# Patient Record
Sex: FEMALE | Race: WHITE | NOT HISPANIC OR LATINO | Employment: OTHER | URBAN - NONMETROPOLITAN AREA
[De-identification: names, ages, dates, MRNs, and addresses within clinical notes are randomized per-mention and may not be internally consistent; named-entity substitution may affect disease eponyms.]

---

## 2017-05-15 ENCOUNTER — TELEPHONE (OUTPATIENT)
Dept: FAMILY MEDICINE | Facility: OTHER | Age: 67
End: 2017-05-15

## 2017-05-15 DIAGNOSIS — R30.0 DYSURIA: ICD-10-CM

## 2017-05-15 DIAGNOSIS — R30.0 DYSURIA: Primary | ICD-10-CM

## 2017-05-15 DIAGNOSIS — R82.90 ABNORMAL URINE FINDINGS: Primary | ICD-10-CM

## 2017-05-15 LAB
ALBUMIN UR-MCNC: NEGATIVE MG/DL
APPEARANCE UR: CLEAR
BACTERIA #/AREA URNS HPF: ABNORMAL /HPF
BILIRUB UR QL STRIP: NEGATIVE
COLOR UR AUTO: YELLOW
GLUCOSE UR STRIP-MCNC: NEGATIVE MG/DL
HGB UR QL STRIP: NEGATIVE
KETONES UR STRIP-MCNC: NEGATIVE MG/DL
LEUKOCYTE ESTERASE UR QL STRIP: ABNORMAL
MUCOUS THREADS #/AREA URNS LPF: PRESENT /LPF
NITRATE UR QL: NEGATIVE
PH UR STRIP: 7 PH (ref 4.7–8)
RBC #/AREA URNS AUTO: 1 /HPF (ref 0–2)
SP GR UR STRIP: 1.01 (ref 1–1.03)
SQUAMOUS #/AREA URNS AUTO: 3 /HPF (ref 0–1)
URN SPEC COLLECT METH UR: ABNORMAL
UROBILINOGEN UR STRIP-MCNC: NORMAL MG/DL (ref 0–2)
WBC #/AREA URNS AUTO: 1 /HPF (ref 0–2)

## 2017-05-15 PROCEDURE — 81001 URINALYSIS AUTO W/SCOPE: CPT | Mod: ZL | Performed by: PHYSICIAN ASSISTANT

## 2017-05-15 PROCEDURE — 87086 URINE CULTURE/COLONY COUNT: CPT | Mod: ZL | Performed by: PHYSICIAN ASSISTANT

## 2017-05-15 NOTE — TELEPHONE ENCOUNTER
3:54 PM    Reason for Call: OVERBOOK    Patient is having the following symptoms: uti    The patient is requesting an appointment for rafy if at all possible or anyone else. She does realize that Rafy is out .Was an appointment offered for this call? No    Preferred method for responding to this message: Telephone Call    If we cannot reach you directly, may we leave a detailed response at the number you provided? Yes    Can this message wait until your PCP/provider returns, if unavailable today? No,     Nilam Hyde

## 2017-05-15 NOTE — TELEPHONE ENCOUNTER
Notified we have no further openings today. Will come in for UA only and will call with results.  Emilee Cordon LPN

## 2017-05-18 LAB
BACTERIA SPEC CULT: ABNORMAL
MICRO REPORT STATUS: ABNORMAL
SPECIMEN SOURCE: ABNORMAL

## 2017-09-07 DIAGNOSIS — Z12.31 VISIT FOR SCREENING MAMMOGRAM: Primary | ICD-10-CM

## 2017-10-17 DIAGNOSIS — I10 ESSENTIAL HYPERTENSION WITH GOAL BLOOD PRESSURE LESS THAN 140/90: ICD-10-CM

## 2017-10-18 ENCOUNTER — RADIANT APPOINTMENT (OUTPATIENT)
Dept: MAMMOGRAPHY | Facility: OTHER | Age: 67
End: 2017-10-18
Attending: PHYSICIAN ASSISTANT
Payer: MEDICARE

## 2017-10-18 PROCEDURE — 77063 BREAST TOMOSYNTHESIS BI: CPT | Mod: TC

## 2017-10-18 NOTE — TELEPHONE ENCOUNTER
[place]      Last Written Prescription Date: 10/26/16  Last Fill Quantity: 90, # refills: 3  Last Office Visit with G, P or [facility] prescribing provider: 10/26/16       Potassium   Date Value Ref Range Status   10/26/2016 3.8 3.4 - 5.3 mmol/L Final     Creatinine   Date Value Ref Range Status   10/26/2016 0.78 0.52 - 1.04 mg/dL Final     BP Readings from Last 3 Encounters:   12/30/16 111/86   11/15/16 118/73   10/26/16 118/80

## 2017-10-19 RX ORDER — LOSARTAN POTASSIUM 50 MG/1
TABLET ORAL
Qty: 30 TABLET | Refills: 0 | Status: SHIPPED | OUTPATIENT
Start: 2017-10-19 | End: 2017-11-17

## 2017-10-30 DIAGNOSIS — E78.2 MIXED HYPERLIPIDEMIA: ICD-10-CM

## 2017-10-30 NOTE — LETTER
[address] [address]  [phone]                    Carmelita Watts  [address] [facility] [address]    November 2, 2017      Dear Carmelita Watts    APPOINTMENT REMINDER:   Our records indicates that it is time for you to be seen for a recheck.     Your current medication request will be approved for one refill but you will need to be seen before any additional refills can be approved.  Taking care of your health is important to us, and ongoing visits with your provider are vital to your care.    We look forward to seeing you in the near future.  You may call our office at [phone] to schedule a visit.     Please disregard this notice if you have already made an appointment.          Sincerely,      VENKATESH Vázquez  Family Practice

## 2017-10-31 NOTE — TELEPHONE ENCOUNTER
Atorvastatin 10mg  Last Written Prescription Date: 10-  Last Fill Quantity:90 , #3 refills:     Last Office Visit with G, [facility] or [facility] prescribing provider: 10-  Future Office Visit:

## 2017-11-02 RX ORDER — ATORVASTATIN CALCIUM 10 MG/1
TABLET, FILM COATED ORAL
Qty: 90 TABLET | Refills: 0 | Status: SHIPPED | OUTPATIENT
Start: 2017-11-02 | End: 2017-11-17

## 2017-11-13 DIAGNOSIS — F33.0 MAJOR DEPRESSIVE DISORDER, RECURRENT EPISODE, MILD (H): ICD-10-CM

## 2017-11-15 RX ORDER — ESCITALOPRAM OXALATE 10 MG/1
TABLET ORAL
Qty: 90 TABLET | Refills: 0 | Status: SHIPPED | OUTPATIENT
Start: 2017-11-15 | End: 2017-11-17

## 2017-11-15 NOTE — TELEPHONE ENCOUNTER
Lexapro  Please see note below.  Patient due for office visit.  Last office visit 10/26/16.  Last PHQ 10/26/16.    Medication pended.  Please advise.  Thank you

## 2017-11-15 NOTE — TELEPHONE ENCOUNTER
Lexapro  Last Written Prescription Date: 10/26/2016  Last Fill Quantity: 90,  # refills: 3   Last Office Visit with [facility], [facility] or [facility] prescribing provider: 10/26/2016                                         Next 5 appointments (look out 90 days)     Nov 17, 2017  8:00 AM CST   (Arrive by 7:45 AM)   SHORT with VENKATESH Stevens   [facility] ([facility] )    [address] [place] [address]   [phone]

## 2017-11-17 ENCOUNTER — OFFICE VISIT (OUTPATIENT)
Dept: FAMILY MEDICINE | Facility: OTHER | Age: 67
End: 2017-11-17
Attending: PHYSICIAN ASSISTANT
Payer: MEDICARE

## 2017-11-17 VITALS
BODY MASS INDEX: 29.45 KG/M2 | OXYGEN SATURATION: 96 % | SYSTOLIC BLOOD PRESSURE: 122 MMHG | HEIGHT: 61 IN | TEMPERATURE: 97.9 F | WEIGHT: 156 LBS | HEART RATE: 77 BPM | DIASTOLIC BLOOD PRESSURE: 66 MMHG

## 2017-11-17 DIAGNOSIS — I10 ESSENTIAL HYPERTENSION WITH GOAL BLOOD PRESSURE LESS THAN 140/90: ICD-10-CM

## 2017-11-17 DIAGNOSIS — Z23 NEED FOR VACCINATION: ICD-10-CM

## 2017-11-17 DIAGNOSIS — J30.2 CHRONIC SEASONAL ALLERGIC RHINITIS, UNSPECIFIED TRIGGER: ICD-10-CM

## 2017-11-17 DIAGNOSIS — F33.0 MAJOR DEPRESSIVE DISORDER, RECURRENT EPISODE, MILD (H): Primary | ICD-10-CM

## 2017-11-17 DIAGNOSIS — E78.2 MIXED HYPERLIPIDEMIA: ICD-10-CM

## 2017-11-17 LAB
ALBUMIN SERPL-MCNC: 3.6 G/DL (ref 3.4–5)
ALP SERPL-CCNC: 132 U/L (ref 40–150)
ALT SERPL W P-5'-P-CCNC: 27 U/L (ref 0–50)
ANION GAP SERPL CALCULATED.3IONS-SCNC: 5 MMOL/L (ref 3–14)
AST SERPL W P-5'-P-CCNC: 18 U/L (ref 0–45)
BILIRUB SERPL-MCNC: 0.5 MG/DL (ref 0.2–1.3)
BUN SERPL-MCNC: 10 MG/DL (ref 7–30)
CALCIUM SERPL-MCNC: 8.9 MG/DL (ref 8.5–10.1)
CHLORIDE SERPL-SCNC: 102 MMOL/L (ref 94–109)
CHOLEST SERPL-MCNC: 184 MG/DL
CO2 SERPL-SCNC: 29 MMOL/L (ref 20–32)
CREAT SERPL-MCNC: 0.69 MG/DL (ref 0.52–1.04)
ERYTHROCYTE [DISTWIDTH] IN BLOOD BY AUTOMATED COUNT: 13.7 % (ref 10–15)
GFR SERPL CREATININE-BSD FRML MDRD: 84 ML/MIN/1.7M2
GLUCOSE SERPL-MCNC: 104 MG/DL (ref 70–99)
HCT VFR BLD AUTO: 38.9 % (ref 35–47)
HDLC SERPL-MCNC: 60 MG/DL
HGB BLD-MCNC: 13.3 G/DL (ref 11.7–15.7)
LDLC SERPL CALC-MCNC: 108 MG/DL
MCH RBC QN AUTO: 30.9 PG (ref 26.5–33)
MCHC RBC AUTO-ENTMCNC: 34.2 G/DL (ref 31.5–36.5)
MCV RBC AUTO: 91 FL (ref 78–100)
NONHDLC SERPL-MCNC: 124 MG/DL
PLATELET # BLD AUTO: 266 10E9/L (ref 150–450)
POTASSIUM SERPL-SCNC: 4.1 MMOL/L (ref 3.4–5.3)
PROT SERPL-MCNC: 8.7 G/DL (ref 6.8–8.8)
RBC # BLD AUTO: 4.3 10E12/L (ref 3.8–5.2)
SODIUM SERPL-SCNC: 136 MMOL/L (ref 133–144)
TRIGL SERPL-MCNC: 78 MG/DL
TSH SERPL DL<=0.005 MIU/L-ACNC: 1.77 MU/L (ref 0.4–4)
WBC # BLD AUTO: 4.9 10E9/L (ref 4–11)

## 2017-11-17 PROCEDURE — 90714 TD VACC NO PRESV 7 YRS+ IM: CPT | Performed by: PHYSICIAN ASSISTANT

## 2017-11-17 PROCEDURE — 99212 OFFICE O/P EST SF 10 MIN: CPT

## 2017-11-17 PROCEDURE — 36415 COLL VENOUS BLD VENIPUNCTURE: CPT | Mod: ZL | Performed by: PHYSICIAN ASSISTANT

## 2017-11-17 PROCEDURE — 90471 IMMUNIZATION ADMIN: CPT | Performed by: PHYSICIAN ASSISTANT

## 2017-11-17 PROCEDURE — 80053 COMPREHEN METABOLIC PANEL: CPT | Mod: ZL | Performed by: PHYSICIAN ASSISTANT

## 2017-11-17 PROCEDURE — 80061 LIPID PANEL: CPT | Mod: ZL | Performed by: PHYSICIAN ASSISTANT

## 2017-11-17 PROCEDURE — 99214 OFFICE O/P EST MOD 30 MIN: CPT | Performed by: PHYSICIAN ASSISTANT

## 2017-11-17 PROCEDURE — 90472 IMMUNIZATION ADMIN EACH ADD: CPT | Performed by: PHYSICIAN ASSISTANT

## 2017-11-17 PROCEDURE — 85027 COMPLETE CBC AUTOMATED: CPT | Mod: ZL | Performed by: PHYSICIAN ASSISTANT

## 2017-11-17 PROCEDURE — 84443 ASSAY THYROID STIM HORMONE: CPT | Mod: ZL | Performed by: PHYSICIAN ASSISTANT

## 2017-11-17 PROCEDURE — 90670 PCV13 VACCINE IM: CPT | Performed by: PHYSICIAN ASSISTANT

## 2017-11-17 RX ORDER — ATORVASTATIN CALCIUM 10 MG/1
10 TABLET, FILM COATED ORAL DAILY
Qty: 90 TABLET | Refills: 3 | Status: SHIPPED | OUTPATIENT
Start: 2017-11-17 | End: 2019-01-03

## 2017-11-17 RX ORDER — LOSARTAN POTASSIUM 50 MG/1
50 TABLET ORAL DAILY
Qty: 90 TABLET | Refills: 3 | Status: SHIPPED | OUTPATIENT
Start: 2017-11-17 | End: 2018-11-12

## 2017-11-17 RX ORDER — ESCITALOPRAM OXALATE 10 MG/1
10 TABLET ORAL DAILY
Qty: 90 TABLET | Refills: 3 | Status: SHIPPED | OUTPATIENT
Start: 2017-11-17 | End: 2018-12-31

## 2017-11-17 ASSESSMENT — PATIENT HEALTH QUESTIONNAIRE - PHQ9: SUM OF ALL RESPONSES TO PHQ QUESTIONS 1-9: 1

## 2017-11-17 ASSESSMENT — PAIN SCALES - GENERAL: PAINLEVEL: NO PAIN (0)

## 2017-11-17 ASSESSMENT — ANXIETY QUESTIONNAIRES
5. BEING SO RESTLESS THAT IT IS HARD TO SIT STILL: NOT AT ALL
3. WORRYING TOO MUCH ABOUT DIFFERENT THINGS: NOT AT ALL
1. FEELING NERVOUS, ANXIOUS, OR ON EDGE: SEVERAL DAYS
7. FEELING AFRAID AS IF SOMETHING AWFUL MIGHT HAPPEN: NOT AT ALL
IF YOU CHECKED OFF ANY PROBLEMS ON THIS QUESTIONNAIRE, HOW DIFFICULT HAVE THESE PROBLEMS MADE IT FOR YOU TO DO YOUR WORK, TAKE CARE OF THINGS AT HOME, OR GET ALONG WITH OTHER PEOPLE: NOT DIFFICULT AT ALL
GAD7 TOTAL SCORE: 2
2. NOT BEING ABLE TO STOP OR CONTROL WORRYING: SEVERAL DAYS
4. TROUBLE RELAXING: NOT AT ALL
6. BECOMING EASILY ANNOYED OR IRRITABLE: NOT AT ALL

## 2017-11-17 NOTE — PATIENT INSTRUCTIONS
Thank you for choosing [facility].   I have office hours 8:00 am to 4:30 pm on Monday's, Wednesday's, Thursday's and Friday's. My nurse and I are out of the office every Tuesday.    Following your visit, when your labs and diagnostic testing have returned, I will review then and you will be contacted by my nurse.  If you are on My Chart, you can also view results there.    For refills, notify your pharmacy regarding what you need and the pharmacy will generate a refill request. Do not call my nurse as she is unable to process refill request. Please plan ahead and allow 3-5 days for refill requests.    You will generally receive a reminder call the day prior to your appointment.  If you cannot attend your appointment, please cancel your appointment with as much notice as possible.  If there is a pattern of failure to present for your appointments, I cannot provide consistent, meaningful, ongoing care for you. It is very important to me that you come in for your care, so we can best assist you with your health care needs.    IMPORTANT:  Please note that it is my standard of practice to NOT participate in prescribing ongoing requested Narcotic Analgesic therapy, and/or participate in the prescribing of other controlled substances.  My nurse and I am happy to assist you with the process of referral for alternative pain management as needed, and other treatment modalities including but not limited to:  Physical Therapy, Physical Medicine and Rehab, Counseling, Chiropractic Care, Orthopedic Care, and non-narcotic medication management.     In the event that you need to be seen for emergent concerns and I am out of office,  please see one of my colleagues for acute concerns.  You may also present to  or ER.  I appreciate the opportunity to serve you and look forward to supporting your healthcare needs in the future. Please contact me with any questions or concerns that you may  have.    Sincerely,      Kenyatta Trujillo RN, PA-C

## 2017-11-17 NOTE — MR AVS SNAPSHOT
After Visit Summary   11/17/2017    Carmelita Watts    MRN: [id number]           Patient Information     Date Of Birth          1950        Visit Information        Provider Department      11/17/2017 8:00 AM Kenyatta Trujillo PA [facility]        Today's Diagnoses     Major depressive disorder, recurrent episode, mild (H)    -  1    Essential hypertension with goal blood pressure less than 140/90        Mixed hyperlipidemia        Chronic seasonal allergic rhinitis, unspecified trigger          Care Instructions      Thank you for choosing [facility].   I have office hours 8:00 am to 4:30 pm on Monday's, Wednesday's, Thursday's and Friday's. My nurse and I are out of the office every Tuesday.    Following your visit, when your labs and diagnostic testing have returned, I will review then and you will be contacted by my nurse.  If you are on My Chart, you can also view results there.    For refills, notify your pharmacy regarding what you need and the pharmacy will generate a refill request. Do not call my nurse as she is unable to process refill request. Please plan ahead and allow 3-5 days for refill requests.    You will generally receive a reminder call the day prior to your appointment.  If you cannot attend your appointment, please cancel your appointment with as much notice as possible.  If there is a pattern of failure to present for your appointments, I cannot provide consistent, meaningful, ongoing care for you. It is very important to me that you come in for your care, so we can best assist you with your health care needs.    IMPORTANT:  Please note that it is my standard of practice to NOT participate in prescribing ongoing requested Narcotic Analgesic therapy, and/or participate in the prescribing of other controlled substances.  My nurse and I am happy to assist you with the process of referral for alternative pain management as needed, and other treatment  "modalities including but not limited to:  Physical Therapy, Physical Medicine and Rehab, Counseling, Chiropractic Care, Orthopedic Care, and non-narcotic medication management.     In the event that you need to be seen for emergent concerns and I am out of office,  please see one of my colleagues for acute concerns.  You may also present to  or ER.  I appreciate the opportunity to serve you and look forward to supporting your healthcare needs in the future. Please contact me with any questions or concerns that you may have.    Sincerely,      Kenyatta Trujillo RN, PA-C               Follow-ups after your visit        Who to contact     If you have questions or need follow up information about today's clinic visit or your schedule please contact [facility] directly at [phone].  Normal or non-critical lab and imaging results will be communicated to you by MyChart, letter or phone within 4 business days after the clinic has received the results. If you do not hear from us within 7 days, please contact the clinic through MyChart or phone. If you have a critical or abnormal lab result, we will notify you by phone as soon as possible.  Submit refill requests through [employer] or call your pharmacy and they will forward the refill request to us. Please allow 3 business days for your refill to be completed.          Additional Information About Your Visit        Care EveryWhere ID     This is your Care EveryWhere ID. This could be used by other organizations to access your [facility] medical records  [id number]        Your Vitals Were     Pulse Temperature Height Pulse Oximetry Breastfeeding? BMI (Body Mass Index)    77 97.9  F (36.6  C) (Tympanic) 5' 0.5\" (1.537 m) 96% No 29.97 kg/m2       Blood Pressure from Last 3 Encounters:   11/17/17 122/66   12/30/16 111/86   11/15/16 118/73    Weight from Last 3 Encounters:   11/17/17 156 lb (70.8 kg)   12/30/16 157 lb (71.2 kg)   11/15/16 153 lb (69.4 kg)            "   We Performed the Following     CBC with platelets     Comprehensive metabolic panel     Lipid Profile     TSH with free T4 reflex          Today's Medication Changes          These changes are accurate as of: 11/17/17  8:27 AM.  If you have any questions, ask your nurse or doctor.               These medicines have changed or have updated prescriptions.        Dose/Directions    atorvastatin 10 MG tablet   Commonly known as:  LIPITOR   This may have changed:  See the new instructions.   Used for:  Mixed hyperlipidemia   Changed by:  Kenyatta Trujillo PA        Dose:  10 mg   Take 1 tablet (10 mg) by mouth daily   Quantity:  90 tablet   Refills:  3       escitalopram 10 MG tablet   Commonly known as:  LEXAPRO   This may have changed:  See the new instructions.   Used for:  Major depressive disorder, recurrent episode, mild (H)   Changed by:  Kenyatta Trujillo PA        Dose:  10 mg   Take 1 tablet (10 mg) by mouth daily   Quantity:  90 tablet   Refills:  3       losartan 50 MG tablet   Commonly known as:  COZAAR   This may have changed:  See the new instructions.   Used for:  Essential hypertension with goal blood pressure less than 140/90   Changed by:  Kenyatta Trujillo PA        Dose:  50 mg   Take 1 tablet (50 mg) by mouth daily   Quantity:  90 tablet   Refills:  3            Where to get your medicines      [id number] - [place], [place] - [address] AT [facility] [address]  [address], [facility] [address]     Phone:  [phone]     atorvastatin 10 MG tablet    escitalopram 10 MG tablet    losartan 50 MG tablet                Primary Care Provider Office Phone # Fax #    VENKATESH Stevens [phone]       [facility] [address] [place] [address] 2  [facility] [address]        Equal Access to Services     [facility] [place] AH: Hadii aad ku hadasho Soomaali, waaxda luqadaha, qaybta kaalfaye nicolas, kerri osman.  So [facility] [phone].    ATENCIÓN: Si vera chisholm, tiene a hsu disposición servicios gratuitos de asistencia lingüística. Gini mccollum [phone].    We comply with applicable federal civil rights laws and [place] laws. We do not discriminate on the basis of race, color, national origin, age, disability, sex, sexual orientation, or gender identity.            Thank you!     [facility] you for [facility]  [facility] your [facility]. Our goal is always to provide you with excellent care. Hearing back from our patients is one way we can continue to improve our services. Please take a few minutes to complete the written survey that you may receive in the mail after your visit with us. Thank you!             Your Updated Medication List - Protect others around you: Learn how to safely use, store and throw away your medicines at www.disposemymeds.org.          This list is accurate as of: 11/17/17  8:27 AM.  Always use your most recent med list.                   Brand Name Dispense Instructions for use Diagnosis    aspirin 81 MG tablet      Take 81 mg by mouth daily        atorvastatin 10 MG tablet    LIPITOR    90 tablet    Take 1 tablet (10 mg) by mouth daily    Mixed hyperlipidemia       EPINEPHrine 0.3 MG/0.3ML injection    EPIPEN     Inject 0.3 mg into the muscle once as needed. Use as needed for anaphylaxis.        escitalopram 10 MG tablet    LEXAPRO    90 tablet    Take 1 tablet (10 mg) by mouth daily    Major depressive disorder, recurrent episode, mild (H)       fluticasone 50 MCG/ACT spray    FLONASE    48 mL    SPRAY 2 SPRAYS INTO BOTH NOSTRILS DAILY    Seasonal allergies       losartan 50 MG tablet    COZAAR    90 tablet    Take 1 tablet (50 mg) by mouth daily    Essential hypertension with goal blood pressure less than 140/90       olopatadine 0.1 % ophthalmic solution    PATANOL    1 Bottle    Instill one drop into affected eye(s) 2 times daily at an interval of 6-8 hours.        triamcinolone 0.1 %  cream    KENALOG    30 g    Apply sparingly to affected area three times daily for 14 days.    Irritant contact dermatitis due to metals

## 2017-11-17 NOTE — LETTER
My Depression Action Plan  Name: Carmelita Watts   Date of Birth 1950  Date: 11/17/2017    My doctor: Kenyatta Trujillo   My clinic: [facility]  [place] [address]  [phone]          GREEN    ZONE   Good Control    What it looks like:     Things are going generally well. You have normal up s and down s. You may even feel depressed from time to time, but bad moods usually last less than a day.   What you need to do:  1. Continue to care for yourself (see self care plan)  2. Check your depression survival kit and update it as needed  3. Follow your physician s recommendations including any medication.  4. Do not stop taking medication unless you consult with your physician first.           YELLOW         ZONE Getting Worse    What it looks like:     Depression is starting to interfere with your life.     It may be hard to get out of bed; you may be starting to isolate yourself from others.    Symptoms of depression are starting to last most all day and this has happened for several days.     You may have suicidal thoughts but they are not constant.   What you need to do:     1. Call your care team, your response to treatment will improve if you keep your care team informed of your progress. Yellow periods are signs an adjustment may need to be made.     2. Continue your self-care, even if you have to fake it!    3. Talk to someone in your support network    4. Open up your depression survival kit           RED    ZONE Medical Alert - Get Help    What it looks like:     Depression is seriously interfering with your life.     You may experience these or other symptoms: You can t get out of bed most days, can t work or engage in other necessary activities, you have trouble taking care of basic hygiene, or basic responsibilities, thoughts of suicide or death that will not go away, self-injurious behavior.     What you need to do:  1. Call your care team and request a  same-day appointment. If they are not available (weekends or after hours) call your local crisis line, emergency room or 911.      Electronically signed by: Chloe King, November 17, 2017    Depression Self Care Plan / Survival Kit    Self-Care for Depression  Here s the deal. Your body and mind are really not as separate as most people think.  What you do and think affects how you feel and how you feel influences what you do and think. This means if you do things that people who feel good do, it will help you feel better.  Sometimes this is all it takes.  There is also a place for medication and therapy depending on how severe your depression is, so be sure to consult with your medical provider and/ or Behavioral Health Consultant if your symptoms are worsening or not improving.     In order to better manage my stress, I will:    Exercise  Get some form of exercise, every day. This will help reduce pain and release endorphins, the  feel good  chemicals in your brain. This is almost as good as taking antidepressants!  This is not the same as joining a gym and then never going! (they count on that by the way ) It can be as simple as just going for a walk or doing some gardening, anything that will get you moving.      Hygiene   Maintain good hygiene (Get out of bed in the morning, Make your bed, Brush your teeth, Take a shower, and Get dressed like you were going to work, even if you are unemployed).  If your clothes don't fit try to get ones that do.    Diet  I will strive to eat foods that are good for me, drink plenty of water, and avoid excessive sugar, caffeine, alcohol, and other mood-altering substances.  Some foods that are helpful in depression are: complex carbohydrates, B vitamins, flaxseed, fish or fish oil, fresh fruits and vegetables.    Psychotherapy  I agree to participate in Individual Therapy (if recommended).    Medication  If prescribed medications, I agree to take them.  Missing doses can  result in serious side effects.  I understand that drinking alcohol, or other illicit drug use, may cause potential side effects.  I will not stop my medication abruptly without first discussing it with my provider.    Staying Connected With Others  I will stay in touch with my friends, family members, and my primary care provider/team.    Use your imagination  Be creative.  We all have a creative side; it doesn t matter if it s oil painting, sand castles, or mud pies! This will also kick up the endorphins.    Witness Beauty  (AKA stop and smell the roses) Take a look outside, even in mid-winter. Notice colors, textures. Watch the squirrels and birds.     Service to others  Be of service to others.  There is always someone else in need.  By helping others we can  get out of ourselves  and remember the really important things.  This also provides opportunities for practicing all the other parts of the program.    Humor  Laugh and be silly!  Adjust your TV habits for less news and crime-drama and more comedy.    Control your stress  Try breathing deep, massage therapy, biofeedback, and meditation. Find time to relax each day.     My support system    Clinic Contact:  Phone number:    Contact 1:  Phone number:    Contact 2:  Phone number:    Tenriism/:  Phone number:    Therapist:  Phone number:    Local crisis center:    Phone number:    Other community support:  Phone number:

## 2017-11-17 NOTE — PROGRESS NOTES
SUBJECTIVE:   Carmelita Watts is a 67 year old female who presents to clinic today for the following health issues:        Hyperlipidemia Follow-Up      Rate your low fat/cholesterol diet?: good    Taking statin?  Yes, no muscle aches from statin    Other lipid medications/supplements?:  none    Hypertension Follow-up      Outpatient blood pressures are not being checked.    Low Salt Diet: no added salt    Depression and Anxiety Follow-Up    Status since last visit: No change    Other associated symptoms:None    Complicating factors:     Significant life event: Yes-   diagnosed congestive heart failure      Current substance abuse: None    PHQ-9 Score and MyChart F/U Questions 10/1/2015 10/26/2016 11/17/2017   Total Score 0 0 1   Q9: Suicide Ideation Not at all Not at all Not at all     MINAL-7 SCORE 11/17/2017   Total Score 2       PHQ-9  English  PHQ-9   Any Language  GAD7  Suicide Assessment Five-step Evaluation and Treatment (SAFE-T)          Amount of exercise or physical activity: 4-5 days/week for an average of 30-45 minutes    Problems taking medications regularly: No    Medication side effects: none  Diet: regular (no restrictions)          Problem list and histories reviewed & adjusted, as indicated.  Additional history: as documented    Patient Active Problem List   Diagnosis     Hyperlipidemia LDL goal <130     Hypertension goal BP (blood pressure) < 140/80     Advanced care planning/counseling discussion     Major depressive disorder, recurrent episode, mild (H)     Seasonal allergies     Mixed hyperlipidemia     ACP (advance care planning)     Past Surgical History:   Procedure Laterality Date     APPENDECTOMY       CHOLECYSTECTOMY       COLONOSCOPY  07-    repeat 10 years     COLONOSCOPY N/A 12/30/2016    Procedure: COLONOSCOPY;  Surgeon: Bhaskar Franklin DO;  Location: [facility]     SINUS SURGERY       TUBAL STERILIZATION         Social History   Substance Use Topics     Smoking status:  "Former Smoker     Types: Cigarettes     Smokeless tobacco: Never Used      Comment: Tried to Quit (YES); QUIT in 1971; Passive Exposure (NO)     Alcohol use Yes      Comment: RARELY     Family History   Problem Relation Age of Onset     Breast Cancer Mother 66     Cause of Death     Parkinsonism Father      \"Possible\"         Current Outpatient Prescriptions   Medication Sig Dispense Refill     escitalopram (LEXAPRO) 10 MG tablet Take 1 tablet (10 mg) by mouth daily 90 tablet 3     losartan (COZAAR) 50 MG tablet Take 1 tablet (50 mg) by mouth daily 90 tablet 3     atorvastatin (LIPITOR) 10 MG tablet Take 1 tablet (10 mg) by mouth daily 90 tablet 3     fluticasone (FLONASE) 50 MCG/ACT nasal spray SPRAY 2 SPRAYS INTO BOTH NOSTRILS DAILY 48 mL 0     triamcinolone (KENALOG) 0.1 % cream Apply sparingly to affected area three times daily for 14 days. 30 g 0     aspirin 81 MG tablet Take 81 mg by mouth daily       olopatadine (PATANOL) 0.1 % ophthalmic solution Instill one drop into affected eye(s) 2 times daily at an interval of 6-8 hours. 1 Bottle 11     EPINEPHrine (EPIPEN) 0.3 MG/0.3ML injection Inject 0.3 mg into the muscle once as needed. Use as needed for anaphylaxis.       [DISCONTINUED] escitalopram (LEXAPRO) 10 MG tablet TAKE ONE TABLET BY MOUTH EVERY DAY 90 tablet 0     [DISCONTINUED] atorvastatin (LIPITOR) 10 MG tablet TAKE 1 TABLET BY MOUTH DAILY 90 tablet 0     [DISCONTINUED] losartan (COZAAR) 50 MG tablet TAKE ONE TABLET BY MOUTH EVERY DAY 30 tablet 0     Allergies   Allergen Reactions     Lisinopril Cough     Phenylephrine Hcl Other (See Comments)     **Entex  HEADACHE (SEVERE)     Phenylpropanolamine Other (See Comments)     **Entex  HEADACHE (SEVERE)     Pseudoephedrine Tannate Other (See Comments)     **Entex  HEADACHE (SEVERE)     Levofloxacin Rash     **Levaquin     Moxifloxacin Hcl [Avelox] Rash     Recent Labs   Lab Test  10/26/16   0921  10/01/15   1015  08/22/14   1155   LDL  109*  115  106   HDL  " "54  56  57   TRIG  115  110  85   ALT  21  25  22   CR  0.78  0.76  0.76   GFRESTIMATED  74  76  76   GFRESTBLACK  90  >90   GFR Calc    >90   GFR Calc     POTASSIUM  3.8  4.2  3.9   TSH  2.08  1.72  1.53      BP Readings from Last 3 Encounters:   11/17/17 122/66   12/30/16 111/86   11/15/16 118/73    Wt Readings from Last 3 Encounters:   11/17/17 156 lb (70.8 kg)   12/30/16 157 lb (71.2 kg)   11/15/16 153 lb (69.4 kg)                          Reviewed and updated as needed this visit by clinical staffAllergies       Reviewed and updated as needed this visit by Provider         ROS:  Constitutional, neuro, ENT, endocrine, pulmonary, cardiac, gastrointestinal, genitourinary, musculoskeletal, integument and psychiatric systems are negative, except as otherwise noted.      OBJECTIVE:                                                    /66 (BP Location: Left arm, Patient Position: Chair, Cuff Size: Adult Large)  Pulse 77  Temp 97.9  F (36.6  C) (Tympanic)  Ht 5' 0.5\" (1.537 m)  Wt 156 lb (70.8 kg)  SpO2 96%  Breastfeeding? No  BMI 29.97 kg/m2  Body mass index is 29.97 kg/(m^2).  GENERAL APPEARANCE: healthy, alert and no distress  EYES: Eyes grossly normal to inspection, PERRL and conjunctivae and sclerae normal  HENT: ear canals and TM's normal and nose and mouth without ulcers or lesions  NECK: no adenopathy, no asymmetry, masses, or scars and thyroid normal to palpation  RESP: lungs clear to auscultation - no rales, rhonchi or wheezes  CV: regular rates and rhythm, normal S1 S2, no S3 or S4 and no murmur, click or rub  LYMPHATICS: normal ant/post cervical and supraclavicular nodes  ABDOMEN: soft, nontender, without hepatosplenomegaly or masses and bowel sounds normal  MS: extremities normal- no gross deformities noted  SKIN: no suspicious lesions or rashes  NEURO: Normal strength and tone, mentation intact and speech normal  PSYCH: mentation appears normal and affect " normal/bright    Diagnostic test results:  Diagnostic Test Results:  No results found for this or any previous visit (from the past 24 hour(s)).       ASSESSMENT/PLAN:                                                    1. Major depressive disorder, recurrent episode, mild (H)  She is given a refill.   has taken ill and she seems to be doing quiet well with this.   - escitalopram (LEXAPRO) 10 MG tablet; Take 1 tablet (10 mg) by mouth daily  Dispense: 90 tablet; Refill: 3    2. Essential hypertension with goal blood pressure less than 140/90  She is given below.   - losartan (COZAAR) 50 MG tablet; Take 1 tablet (50 mg) by mouth daily  Dispense: 90 tablet; Refill: 3    3. Mixed hyperlipidemia  She is in need of refill.  Tolerating medications without difficulty.  - atorvastatin (LIPITOR) 10 MG tablet; Take 1 tablet (10 mg) by mouth daily  Dispense: 90 tablet; Refill: 3      See Patient Instructions    VENKATESH Trevino  [facility] [place]

## 2017-11-17 NOTE — NURSING NOTE
"Chief Complaint   Patient presents with     Hypertension     Lipids     Anxiety     Depression       Initial /66 (BP Location: Left arm, Patient Position: Chair, Cuff Size: Adult Large)  Pulse 77  Temp 97.9  F (36.6  C) (Tympanic)  Ht 5' 0.5\" (1.537 m)  Wt 156 lb (70.8 kg)  SpO2 96%  Breastfeeding? No  BMI 29.97 kg/m2 Estimated body mass index is 29.97 kg/(m^2) as calculated from the following:    Height as of this encounter: 5' 0.5\" (1.537 m).    Weight as of this encounter: 156 lb (70.8 kg).  Medication Reconciliation: complete   Chloe King CMA(AAMA)   "

## 2017-11-18 ASSESSMENT — ANXIETY QUESTIONNAIRES: GAD7 TOTAL SCORE: 2

## 2018-01-07 ENCOUNTER — HOSPITAL ENCOUNTER (EMERGENCY)
Facility: HOSPITAL | Age: 68
Discharge: HOME | End: 2018-01-07
Attending: NURSE PRACTITIONER | Admitting: NURSE PRACTITIONER
Payer: MEDICARE

## 2018-01-07 VITALS
RESPIRATION RATE: 16 BRPM | SYSTOLIC BLOOD PRESSURE: 138 MMHG | TEMPERATURE: 97.6 F | OXYGEN SATURATION: 98 % | DIASTOLIC BLOOD PRESSURE: 95 MMHG

## 2018-01-07 DIAGNOSIS — L20.82 FLEXURAL ECZEMA: ICD-10-CM

## 2018-01-07 PROCEDURE — 99213 OFFICE O/P EST LOW 20 MIN: CPT | Performed by: NURSE PRACTITIONER

## 2018-01-07 PROCEDURE — G0463 HOSPITAL OUTPT CLINIC VISIT: HCPCS

## 2018-01-07 RX ORDER — TRIAMCINOLONE ACETONIDE 1 MG/G
CREAM TOPICAL
Qty: 30 G | Refills: 0 | Status: SHIPPED | OUTPATIENT
Start: 2018-01-07 | End: 2018-01-21

## 2018-01-07 RX ORDER — CLOTRIMAZOLE 1 %
CREAM (GRAM) TOPICAL
Qty: 30 G | Refills: 0 | Status: SHIPPED | OUTPATIENT
Start: 2018-01-07 | End: 2018-12-31

## 2018-01-07 ASSESSMENT — ENCOUNTER SYMPTOMS
RESPIRATORY NEGATIVE: 1
FEVER: 0
MYALGIAS: 0
FATIGUE: 1
GASTROINTESTINAL NEGATIVE: 1
CHILLS: 0
APPETITE CHANGE: 0
HEADACHES: 0
ARTHRALGIAS: 0

## 2018-01-07 NOTE — ED NOTES
C/o rash to left breast for week pt states it has gotten worse but has not spread, notes rash looks a bit darker today than yesterday, some itchiness

## 2018-01-07 NOTE — ED PROVIDER NOTES
"  History     Chief Complaint   Patient presents with     Rash     under her left breast states \"I think it is shingles.\" no oozing or blisterig noted. states they have darkened over night      HPI  Carmelita Watts is a 67 year old female who presents today with a CC of a rash under her left breast x 1 week.  She notes that it started as nagging and irritating about a week ago.  She notes she had flu like symptoms Dec 19-21.  No fevers.  Those symptoms resolved throughout the Holidays.  She does note that she has been quite fatigued throughout the last 2-3 days, again no fevers.  The rash has expanded slightly since it first started.   She is not diabetic.  No history of yeast rashes.  She had eczema as a child, no recent problems with that.  She avoid scented laundry materials due to history of sensitive skin.  She denies new exposures to soaps, lotions, foods, medications, or supplements.  She was just in for a check up about 1 month ago with good reports.      Problem List:    Patient Active Problem List    Diagnosis Date Noted     ACP (advance care planning) 10/26/2016     Priority: Medium     Advance Care Planning 10/26/2016: ACP Review of Chart / Resources Provided:  Reviewed chart for advance care plan.  Carmelita Watts has no plan or code status on file. Discussed available resources and provided with information. Confirmed code status reflects current choices pending further ACP discussions.  Confirmed/documented legally designated decision makers.  Added by Alison Landeros             Major depressive disorder, recurrent episode, mild (H) 10/01/2015     Priority: Medium     Seasonal allergies 10/01/2015     Priority: Medium     Mixed hyperlipidemia 10/01/2015     Priority: Medium     Hyperlipidemia LDL goal <130 07/05/2013     Priority: Medium     Hypertension goal BP (blood pressure) < 140/80 07/05/2013     Priority: Medium     Advanced care planning/counseling discussion 07/09/2012     Priority: Medium    " "    Past Medical History:    Past Medical History:   Diagnosis Date     Essential hypertension 10/1/2015     Major depressive disorder, recurrent episode, mild (H) 10/1/2015     Mixed hyperlipidemia 10/1/2015     Other specified disorders of bladder 07/09/2012     Seasonal allergies 10/1/2015     Unspecified essential hypertension 03/19/2007     Unspecified sinusitis (chronic) 09/05/2007       Past Surgical History:    Past Surgical History:   Procedure Laterality Date     APPENDECTOMY       CHOLECYSTECTOMY       COLONOSCOPY  07-    repeat 10 years     COLONOSCOPY N/A 12/30/2016    Procedure: COLONOSCOPY;  Surgeon: Bhaskar Franklin DO;  Location: [facility]     SINUS SURGERY       TUBAL STERILIZATION         Family History:    Family History   Problem Relation Age of Onset     Breast Cancer Mother 66     Cause of Death     Parkinsonism Father      \"Possible\"       Social History:  Marital Status:   [2]  Social History   Substance Use Topics     Smoking status: Former Smoker     Types: Cigarettes     Smokeless tobacco: Never Used      Comment: Tried to Quit (YES); QUIT in 1971; Passive Exposure (NO)     Alcohol use Yes      Comment: RARELY        Medications:      triamcinolone (KENALOG) 0.1 % cream   clotrimazole (LOTRIMIN) 1 % cream   escitalopram (LEXAPRO) 10 MG tablet   losartan (COZAAR) 50 MG tablet   atorvastatin (LIPITOR) 10 MG tablet   fluticasone (FLONASE) 50 MCG/ACT nasal spray   aspirin 81 MG tablet   triamcinolone (KENALOG) 0.1 % cream   olopatadine (PATANOL) 0.1 % ophthalmic solution   EPINEPHrine (EPIPEN) 0.3 MG/0.3ML injection         Review of Systems   Constitutional: Positive for fatigue. Negative for appetite change, chills and fever.   HENT: Negative.    Respiratory: Negative.    Gastrointestinal: Negative.    Musculoskeletal: Negative for arthralgias and myalgias.   Skin: Positive for rash.   Neurological: Negative for headaches.       Physical Exam   BP: (!) 164/102  Heart Rate: " 75  Temp: 97.6  F (36.4  C)  Resp: 16  SpO2: 98 %      Physical Exam   Constitutional: She is oriented to person, place, and time. She appears well-developed and well-nourished. She is cooperative. She does not appear ill.   HENT:   Head: Normocephalic and atraumatic.   Cardiovascular: Normal rate.    Pulmonary/Chest: Effort normal.   Neurological: She is alert and oriented to person, place, and time.   Skin:   5 cm x 3 cm area under left breast of light pink mildly raised approximately 1-2 mm in diameter papular rash on a flesh colored base.  No vesicles noted, no crusting noted.  There is a few similar papules noted under across midline under next to the right side of the sternum   Psychiatric: She has a normal mood and affect. Her behavior is normal.   Nursing note and vitals reviewed.      ED Course     ED Course     Procedures      Assessments & Plan (with Medical Decision Making)     I have reviewed the nursing notes.    I have reviewed the findings, diagnosis, plan and need for follow up with the patient.  ASSESSMENT / PLAN:  (L20.82) Flexural eczema  Comment: patient has a history of eczema, due to area will treat patient for combination of eczema and possibility of yeast, reassurance given that rash does not appear that it is shingles: is not on erythematous base, crosses midline, not vesicular, has not crusted (has been present for a week)  Plan:  Avoid exposing your skin to things you know you are sensitive to    Triamcinolone cream as prescribed   Clotrimazole cream as prescribed   Soak 1-2 times daily in a tepid bath, pat dry and immediately apply scent free emollient lotion such as Aquaphor or Eucerin Cream, you may need to lotion 2-3 times daily   Dress in loose, soft cotton clothing. Cotton keeps the skin cool.   Avoid detergents and soaps with heavy perfumes   Try to prevent from scratching the irritation.    Try to avoid getting overheated   Follow up with Primary Care Provider if symptoms do not  improve, return to ED if symptoms worsen or new concerns develop      Discharge Medication List as of 1/7/2018 12:07 PM      START taking these medications    Details   !! triamcinolone (KENALOG) 0.1 % cream Apply to affected area 2-3 times daily for up to 14 days, use sparinglyDisp-30 g, [id number]      clotrimazole (LOTRIMIN) 1 % cream Apply twice daily for up to 14 daysDisp-30 g, [id number]       !! - Potential duplicate medications found. Please discuss with provider.          Final diagnoses:   Flexural eczema       1/7/2018   [facility]     Olive Campbell NP  01/07/18 1223

## 2018-01-07 NOTE — ED AVS SNAPSHOT
[facility]    [address] [address]    Phone:  [phone]                                       Carmelita Watts   MRN: [id number]    Department:  [facility]   Date of Visit:  1/7/2018           Patient Information     Date Of Birth          1950        Your diagnoses for this visit were:     Flexural eczema        You were seen by Jessika Leal NP and Olive Campbell NP.      Follow-up Information     Follow up with [facility].    Specialty:  EMERGENCY MEDICINE    Why:  As needed, If symptoms worsen, or concerns develop    Contact information:    [address] [place] [address] [phone]    Additional information:    From [facility]: Take [address]. Turn left at [address]. Turn left at the first stoplight on [address]. At the first stop sign, take a right onto [address]. Take a left into the parking lot and continue through until you reach the North enterance of the building.       From [place]: Take [address]. Take the [address] ramp towards [place]. Turn left onto [address]. Take a slight right onto [address] [facility]. Turn left at the first stoplight on [address]. At the first stop sign, take a right onto [address]. Take a left into the parking lot and continue through until you reach the North enterance of the building.       From [place]: Take [address]. Take a right at [address]. At the first stop sign, take a right onto [address]. Take a left into the parking lot and continue through until you reach the North enterance of the building.         Follow up with Kenyatta Trujillo PA.    Specialty:  Family Practice    Why:  As needed, if symptoms do not improve    Contact information:    [facility]  [address]  [facility] [address]  [phone]        Discharge References/Attachments      DERMATITIS (ECZEMA), MANAGING ATOPIC  (ENGLISH)         Review of your medicines      START taking        Dose / Directions Last dose taken    clotrimazole 1 % cream   Commonly known as:  LOTRIMIN   Quantity:  30 g        Apply twice daily for up to 14 days   Refills:  0          CONTINUE these medicines which may have CHANGED, or have new prescriptions. If we are uncertain of the size of tablets/capsules you have at home, strength may be listed as something that might have changed.        Dose / Directions Last dose taken    * triamcinolone 0.1 % cream   Commonly known as:  KENALOG   What changed:  Another medication with the same name was added. Make sure you understand how and when to take each.   Quantity:  30 g        Apply sparingly to affected area three times daily for 14 days.   Refills:  0        * triamcinolone 0.1 % cream   Commonly known as:  KENALOG   What changed:  You were already taking a medication with the same name, and this prescription was added. Make sure you understand how and when to take each.   Quantity:  30 g        Apply to affected area 2-3 times daily for up to 14 days, use sparingly   Refills:  0        * Notice:  This list has 2 medication(s) that are the same as other medications prescribed for you. Read the directions carefully, and ask your doctor or other care provider to review them with you.      Our records show that you are taking the medicines listed below. If these are incorrect, please call your family doctor or clinic.        Dose / Directions Last dose taken    aspirin 81 MG tablet   Dose:  81 mg        Take 81 mg by mouth daily   Refills:  0        atorvastatin 10 MG tablet   Commonly known as:  LIPITOR   Dose:  10 mg   Quantity:  90 tablet        Take 1 tablet (10 mg) by mouth daily   Refills:  3        EPINEPHrine 0.3 MG/0.3ML injection   Commonly known as:  EPIPEN   Dose:  0.3 mg        Inject 0.3 mg into the muscle once as needed. Use as needed for anaphylaxis.    Refills:  0        escitalopram 10 MG tablet   Commonly known as:  LEXAPRO   Dose:  10 mg   Quantity:  90 tablet        Take 1 tablet (10 mg) by mouth daily   Refills:  3        fluticasone 50 MCG/ACT spray   Commonly known as:  FLONASE   Quantity:  48 mL        SPRAY 2 SPRAYS INTO BOTH NOSTRILS DAILY   Refills:  0        losartan 50 MG tablet   Commonly known as:  COZAAR   Dose:  50 mg   Quantity:  90 tablet        Take 1 tablet (50 mg) by mouth daily   Refills:  3        olopatadine 0.1 % ophthalmic solution   Commonly known as:  PATANOL   Quantity:  1 Bottle        Instill one drop into affected eye(s) 2 times daily at an interval of 6-8 hours.   Refills:  11                Prescriptions were sent or printed at these locations (2 Prescriptions)                   [facility] - [place], [place] - [address] AT [facility] [address]   [address], [place] [address]    Telephone:  [phone]   Fax:  [phone]   Hours:                  E-Prescribed (2 of 2)         triamcinolone (KENALOG) 0.1 % cream               clotrimazole (LOTRIMIN) 1 % cream                Orders Needing Specimen Collection     None      Pending Results     No orders found from 1/5/2018 to 1/8/2018.            Pending Culture Results     No orders found from 1/5/2018 to 1/8/2018.            Thank you for choosing [facility]       Thank you for choosing [facility] for your care. Our goal is always to provide you with excellent care. Hearing back from our patients is one way we can continue to improve our services. Please take a few minutes to complete the written survey that you may receive in the mail after you visit with us. Thank you!        Care EveryWhere ID     This is your Care EveryWhere ID. This could be used by other organizations to access your [facility] medical records  [id number]        Equal Access to Services     SALEEM GARIBAY AH: esme Jorge qaybta kaalmada adeegyada, waxay  tessa hernandez ah. So [facility] [phone].    ATENCIÓN: Si habla español, tiene a hsu disposición servicios gratuitos de asistencia lingüística. Llame al [phone].    We comply with applicable federal civil rights laws and [place] laws. We do not discriminate on the basis of race, color, national origin, age, disability, sex, sexual orientation, or gender identity.            After Visit Summary       This is your record. Keep this with you and show to your community pharmacist(s) and doctor(s) at your next visit.

## 2018-01-07 NOTE — ED AVS SNAPSHOT
[facility]    [address] [address]    Phone:  [phone]                                       Carmelita Watts   MRN: [id number]    Department:  [facility]   Date of Visit:  1/7/2018           After Visit Summary Signature Page     I have received my discharge instructions, and my questions have been answered. I have discussed any challenges I see with this plan with the nurse or doctor.    ..........................................................................................................................................  Patient/Patient Representative Signature      ..........................................................................................................................................  Patient Representative Print Name and Relationship to Patient    ..................................................               ................................................  Date                                            Time    ..........................................................................................................................................  Reviewed by Signature/Title    ...................................................              ..............................................  Date                                                            Time

## 2018-01-30 DIAGNOSIS — E78.2 MIXED HYPERLIPIDEMIA: ICD-10-CM

## 2018-01-30 RX ORDER — ATORVASTATIN CALCIUM 10 MG/1
10 TABLET, FILM COATED ORAL DAILY
Qty: 90 TABLET | Refills: 3 | OUTPATIENT
Start: 2018-01-30

## 2018-01-30 NOTE — TELEPHONE ENCOUNTER
atorvastatin (LIPITOR) 10 MG tablet   Last Written Prescription Date:  11/17/17  Last Fill Quantity: 90,   # refills: 3  Last Office Visit: 11/17/17  Future Office visit:

## 2018-02-15 DIAGNOSIS — F33.0 MAJOR DEPRESSIVE DISORDER, RECURRENT EPISODE, MILD (H): ICD-10-CM

## 2018-02-15 NOTE — TELEPHONE ENCOUNTER
escitalopram  Last Written Prescription Date: 11/17/17  Last Fill Quantity: 90 # of Refills: 3  Last Office Visit: 11/17/17      Indu Ngo LPN

## 2018-02-16 RX ORDER — ESCITALOPRAM OXALATE 10 MG/1
TABLET ORAL
Qty: 90 TABLET | Refills: 0 | OUTPATIENT
Start: 2018-02-16

## 2018-11-05 ENCOUNTER — RADIANT APPOINTMENT (OUTPATIENT)
Dept: MAMMOGRAPHY | Facility: OTHER | Age: 68
End: 2018-11-05
Attending: PHYSICIAN ASSISTANT
Payer: MEDICARE

## 2018-11-05 DIAGNOSIS — R92.8 ABNORMAL MAMMOGRAM: ICD-10-CM

## 2018-11-05 DIAGNOSIS — Z12.39 SCREENING BREAST EXAMINATION: ICD-10-CM

## 2018-11-05 PROCEDURE — 77067 SCR MAMMO BI INCL CAD: CPT | Mod: TC

## 2018-11-05 PROCEDURE — 77065 DX MAMMO INCL CAD UNI: CPT | Mod: TC,GG

## 2018-11-12 DIAGNOSIS — I10 ESSENTIAL HYPERTENSION WITH GOAL BLOOD PRESSURE LESS THAN 140/90: ICD-10-CM

## 2018-11-12 NOTE — TELEPHONE ENCOUNTER
Losartan 50 mg       Last Written Prescription Date:  11/17/17  Last Fill Quantity: 90,   # refills: 3  Last Office Visit: 11/17/17  Future Office visit:

## 2018-11-13 RX ORDER — LOSARTAN POTASSIUM 50 MG/1
50 TABLET ORAL DAILY
Qty: 90 TABLET | Refills: 0 | Status: SHIPPED | OUTPATIENT
Start: 2018-11-13 | End: 2019-01-03

## 2018-12-31 ENCOUNTER — APPOINTMENT (OUTPATIENT)
Dept: CT IMAGING | Facility: HOSPITAL | Age: 68
End: 2018-12-31
Attending: PHYSICIAN ASSISTANT
Payer: MEDICARE

## 2018-12-31 ENCOUNTER — HOSPITAL ENCOUNTER (EMERGENCY)
Facility: HOSPITAL | Age: 68
Discharge: HOME | End: 2018-12-31
Attending: PHYSICIAN ASSISTANT | Admitting: PHYSICIAN ASSISTANT
Payer: MEDICARE

## 2018-12-31 ENCOUNTER — APPOINTMENT (OUTPATIENT)
Dept: GENERAL RADIOLOGY | Facility: HOSPITAL | Age: 68
End: 2018-12-31
Attending: PHYSICIAN ASSISTANT
Payer: MEDICARE

## 2018-12-31 VITALS
SYSTOLIC BLOOD PRESSURE: 123 MMHG | BODY MASS INDEX: 27.64 KG/M2 | RESPIRATION RATE: 18 BRPM | HEART RATE: 72 BPM | WEIGHT: 156 LBS | DIASTOLIC BLOOD PRESSURE: 75 MMHG | HEIGHT: 63 IN | OXYGEN SATURATION: 96 % | TEMPERATURE: 98 F

## 2018-12-31 DIAGNOSIS — H81.11 BENIGN PAROXYSMAL POSITIONAL VERTIGO, RIGHT: ICD-10-CM

## 2018-12-31 DIAGNOSIS — R42 DIZZINESS: ICD-10-CM

## 2018-12-31 DIAGNOSIS — I44.0 1ST DEGREE AV BLOCK: ICD-10-CM

## 2018-12-31 LAB
ALBUMIN SERPL-MCNC: 3 G/DL (ref 3.4–5)
ALP SERPL-CCNC: 111 U/L (ref 40–150)
ALT SERPL W P-5'-P-CCNC: 21 U/L (ref 0–50)
ANION GAP SERPL CALCULATED.3IONS-SCNC: 7 MMOL/L (ref 3–14)
AST SERPL W P-5'-P-CCNC: 21 U/L (ref 0–45)
BASOPHILS # BLD AUTO: 0 10E9/L (ref 0–0.2)
BASOPHILS NFR BLD AUTO: 0.6 %
BILIRUB SERPL-MCNC: 0.3 MG/DL (ref 0.2–1.3)
BUN SERPL-MCNC: 16 MG/DL (ref 7–30)
CALCIUM SERPL-MCNC: 8.1 MG/DL (ref 8.5–10.1)
CHLORIDE SERPL-SCNC: 100 MMOL/L (ref 94–109)
CO2 SERPL-SCNC: 23 MMOL/L (ref 20–32)
CREAT SERPL-MCNC: 0.7 MG/DL (ref 0.52–1.04)
D DIMER PPP DDU-MCNC: 204 NG/ML D-DU (ref 0–300)
DIFFERENTIAL METHOD BLD: ABNORMAL
EOSINOPHIL # BLD AUTO: 0.1 10E9/L (ref 0–0.7)
EOSINOPHIL NFR BLD AUTO: 1.5 %
ERYTHROCYTE [DISTWIDTH] IN BLOOD BY AUTOMATED COUNT: 14.6 % (ref 10–15)
GFR SERPL CREATININE-BSD FRML MDRD: 88 ML/MIN/{1.73_M2}
GLUCOSE SERPL-MCNC: 130 MG/DL (ref 70–99)
HCT VFR BLD AUTO: 31.7 % (ref 35–47)
HGB BLD-MCNC: 10.7 G/DL (ref 11.7–15.7)
IMM GRANULOCYTES # BLD: 0.1 10E9/L (ref 0–0.4)
IMM GRANULOCYTES NFR BLD: 1.9 %
LYMPHOCYTES # BLD AUTO: 2.1 10E9/L (ref 0.8–5.3)
LYMPHOCYTES NFR BLD AUTO: 39.1 %
MCH RBC QN AUTO: 30.3 PG (ref 26.5–33)
MCHC RBC AUTO-ENTMCNC: 33.8 G/DL (ref 31.5–36.5)
MCV RBC AUTO: 90 FL (ref 78–100)
MONOCYTES # BLD AUTO: 0.6 10E9/L (ref 0–1.3)
MONOCYTES NFR BLD AUTO: 11.4 %
NEUTROPHILS # BLD AUTO: 2.5 10E9/L (ref 1.6–8.3)
NEUTROPHILS NFR BLD AUTO: 45.5 %
NRBC # BLD AUTO: 0 10*3/UL
NRBC BLD AUTO-RTO: 0 /100
PLATELET # BLD AUTO: 225 10E9/L (ref 150–450)
POTASSIUM SERPL-SCNC: 3.6 MMOL/L (ref 3.4–5.3)
PROT SERPL-MCNC: 10.2 G/DL (ref 6.8–8.8)
RBC # BLD AUTO: 3.53 10E12/L (ref 3.8–5.2)
SODIUM SERPL-SCNC: 130 MMOL/L (ref 133–144)
TROPONIN I SERPL-MCNC: <0.015 UG/L (ref 0–0.04)
TROPONIN I SERPL-MCNC: <0.015 UG/L (ref 0–0.04)
WBC # BLD AUTO: 5.4 10E9/L (ref 4–11)

## 2018-12-31 PROCEDURE — 70450 CT HEAD/BRAIN W/O DYE: CPT | Mod: TC

## 2018-12-31 PROCEDURE — 85379 FIBRIN DEGRADATION QUANT: CPT | Performed by: PHYSICIAN ASSISTANT

## 2018-12-31 PROCEDURE — 71045 X-RAY EXAM CHEST 1 VIEW: CPT | Mod: TC

## 2018-12-31 PROCEDURE — 84484 ASSAY OF TROPONIN QUANT: CPT | Performed by: PHYSICIAN ASSISTANT

## 2018-12-31 PROCEDURE — 36415 COLL VENOUS BLD VENIPUNCTURE: CPT | Performed by: PHYSICIAN ASSISTANT

## 2018-12-31 PROCEDURE — 25000128 H RX IP 250 OP 636: Performed by: PHYSICIAN ASSISTANT

## 2018-12-31 PROCEDURE — 93010 ELECTROCARDIOGRAM REPORT: CPT | Performed by: INTERNAL MEDICINE

## 2018-12-31 PROCEDURE — 96374 THER/PROPH/DIAG INJ IV PUSH: CPT

## 2018-12-31 PROCEDURE — 96361 HYDRATE IV INFUSION ADD-ON: CPT

## 2018-12-31 PROCEDURE — 99285 EMERGENCY DEPT VISIT HI MDM: CPT | Mod: Z6 | Performed by: PHYSICIAN ASSISTANT

## 2018-12-31 PROCEDURE — 25000132 ZZH RX MED GY IP 250 OP 250 PS 637: Mod: GY

## 2018-12-31 PROCEDURE — 93005 ELECTROCARDIOGRAM TRACING: CPT

## 2018-12-31 PROCEDURE — A9270 NON-COVERED ITEM OR SERVICE: HCPCS | Mod: GY

## 2018-12-31 PROCEDURE — 85025 COMPLETE CBC W/AUTO DIFF WBC: CPT | Performed by: PHYSICIAN ASSISTANT

## 2018-12-31 PROCEDURE — 80053 COMPREHEN METABOLIC PANEL: CPT | Performed by: PHYSICIAN ASSISTANT

## 2018-12-31 PROCEDURE — 99285 EMERGENCY DEPT VISIT HI MDM: CPT | Mod: 25

## 2018-12-31 RX ORDER — DIAZEPAM 10 MG/2ML
5 INJECTION, SOLUTION INTRAMUSCULAR; INTRAVENOUS ONCE
Status: COMPLETED | OUTPATIENT
Start: 2018-12-31 | End: 2018-12-31

## 2018-12-31 RX ORDER — INFLUENZA A VIRUS A/VICTORIA/4897/2022 IVR-238 (H1N1) ANTIGEN (FORMALDEHYDE INACTIVATED), INFLUENZA A VIRUS A/CALIFORNIA/122/2022 SAN-022 (H3N2) ANTIGEN (FORMALDEHYDE INACTIVATED), AND INFLUENZA B VIRUS B/MICHIGAN/01/2021 ANTIGEN (FORMALDEHYDE INACTIVATED) 60; 60; 60 UG/.5ML; UG/.5ML; UG/.5ML
1 INJECTION, SUSPENSION INTRAMUSCULAR ONCE
Refills: 0 | COMMUNITY
Start: 2018-10-08 | End: 2019-05-03

## 2018-12-31 RX ORDER — DIAZEPAM 5 MG
2.5-5 TABLET ORAL EVERY 6 HOURS PRN
Qty: 20 TABLET | Refills: 0 | Status: SHIPPED | OUTPATIENT
Start: 2018-12-31 | End: 2019-05-03

## 2018-12-31 RX ORDER — SODIUM CHLORIDE 9 MG/ML
INJECTION, SOLUTION INTRAVENOUS CONTINUOUS
Status: DISCONTINUED | OUTPATIENT
Start: 2018-12-31 | End: 2018-12-31 | Stop reason: HOSPADM

## 2018-12-31 RX ORDER — MECLIZINE HYDROCHLORIDE 25 MG/1
TABLET ORAL
Status: COMPLETED
Start: 2018-12-31 | End: 2018-12-31

## 2018-12-31 RX ORDER — MECLIZINE HYDROCHLORIDE 25 MG/1
25 TABLET ORAL 3 TIMES DAILY PRN
Qty: 30 TABLET | Refills: 0 | Status: SHIPPED | OUTPATIENT
Start: 2018-12-31 | End: 2019-05-31

## 2018-12-31 RX ORDER — MECLIZINE HYDROCHLORIDE 25 MG/1
25 TABLET ORAL ONCE
Status: COMPLETED | OUTPATIENT
Start: 2018-12-31 | End: 2018-12-31

## 2018-12-31 RX ADMIN — SODIUM CHLORIDE: 9 INJECTION, SOLUTION INTRAVENOUS at 11:36

## 2018-12-31 RX ADMIN — MECLIZINE 25 MG: 25 TABLET ORAL at 11:36

## 2018-12-31 RX ADMIN — MECLIZINE HYDROCHLORIDE 25 MG: 25 TABLET ORAL at 11:36

## 2018-12-31 RX ADMIN — DIAZEPAM 2.5 MG: 5 INJECTION, SOLUTION INTRAMUSCULAR; INTRAVENOUS at 11:30

## 2018-12-31 ASSESSMENT — ENCOUNTER SYMPTOMS
NAUSEA: 1
FEVER: 0
EYE REDNESS: 0
SORE THROAT: 0
CHILLS: 0
SEIZURES: 0
RESPIRATORY NEGATIVE: 1
VOMITING: 1
FACIAL ASYMMETRY: 0
SPEECH DIFFICULTY: 0
NUMBNESS: 0
NECK STIFFNESS: 0
WEAKNESS: 0
CARDIOVASCULAR NEGATIVE: 1
TREMORS: 0
DIZZINESS: 1

## 2018-12-31 ASSESSMENT — MIFFLIN-ST. JEOR: SCORE: 1196.74

## 2018-12-31 NOTE — ED NOTES
Wasted 2.5 mg of Valium with Janna SWARTZ Rn. Partial dose given. Also notified pharmacy, [facility].

## 2018-12-31 NOTE — DISCHARGE INSTRUCTIONS
PT should be calling you to schedule a therapy appointment for your vertigo. Our stress test should be calling you to schedule an exercise stress test. Take the Meclizine as prescribed for dizziness. You may take the Valium as prescribed for dizziness not improved by the meclizine, no driving while taking this. Follow up with primary care in 3 days for re-check. Please return here sooner with any new or worsening symptoms.

## 2018-12-31 NOTE — ED TRIAGE NOTES
Pt in for complaints of sudden on set of dizziness and vomiting while cleaning bathroom this am. Arrived via EMS with ongoing vomiting. Was given zofran 4mg IV en-route to ED with no relief.

## 2018-12-31 NOTE — ED AVS SNAPSHOT
[facility]  [address] [address]  Phone:  [phone]                                    Carmelita Watts   MRN: [id number]    Department:  [facility]   Date of Visit:  12/31/2018           After Visit Summary Signature Page    I have received my discharge instructions, and my questions have been answered. I have discussed any challenges I see with this plan with the nurse or doctor.    ..........................................................................................................................................  Patient/Patient Representative Signature      ..........................................................................................................................................  Patient Representative Print Name and Relationship to Patient    ..................................................               ................................................  Date                                   Time    ..........................................................................................................................................  Reviewed by Signature/Title    ...................................................              ..............................................  Date                                               Time          22EPIC Rev 08/18

## 2018-12-31 NOTE — ED NOTES
Discharge instructions completed with patient with no questions or concerns. Aware of stress test order, as well as PT consult. Written Rx given. Aware of Rx to . Verbalized understanding to not drive while taking Valium.

## 2019-01-01 NOTE — ED PROVIDER NOTES
History     Chief Complaint   Patient presents with     Dizziness     HPI  Carmelita Watts is a 68 year old female who present with sudden onset of dizziness after bending over to clean the floor this am. Has to close her eyes to stop the room from spinning. Very nauseous and emesis x 2. Denies unilateral weakness/numbness. Denies CP or dyspnea.     Problem List:    Patient Active Problem List    Diagnosis Date Noted     ACP (advance care planning) 10/26/2016     Priority: Medium     Advance Care Planning 10/26/2016: ACP Review of Chart / Resources Provided:  Reviewed chart for advance care plan.  Carmelita Watts has no plan or code status on file. Discussed available resources and provided with information. Confirmed code status reflects current choices pending further ACP discussions.  Confirmed/documented legally designated decision makers.  Added by Alison Landeros             Major depressive disorder, recurrent episode, mild (H) 10/01/2015     Priority: Medium     Seasonal allergies 10/01/2015     Priority: Medium     Mixed hyperlipidemia 10/01/2015     Priority: Medium     Hyperlipidemia LDL goal <130 07/05/2013     Priority: Medium     Hypertension goal BP (blood pressure) < 140/80 07/05/2013     Priority: Medium     Advanced care planning/counseling discussion 07/09/2012     Priority: Medium        Past Medical History:    Past Medical History:   Diagnosis Date     Essential hypertension 10/1/2015     Major depressive disorder, recurrent episode, mild (H) 10/1/2015     Mixed hyperlipidemia 10/1/2015     Other specified disorders of bladder 07/09/2012     Seasonal allergies 10/1/2015     Unspecified essential hypertension 03/19/2007     Unspecified sinusitis (chronic) 09/05/2007       Past Surgical History:    Past Surgical History:   Procedure Laterality Date     APPENDECTOMY       CHOLECYSTECTOMY       COLONOSCOPY  07-    repeat 10 years     COLONOSCOPY N/A 12/30/2016    Procedure: COLONOSCOPY;   "Surgeon: Bhaskar Franklin DO;  Location: [facility]     SINUS SURGERY       TUBAL STERILIZATION         Family History:    Family History   Problem Relation Age of Onset     Breast Cancer Mother 66        Cause of Death     Parkinsonism Father         \"Possible\"       Social History:  Marital Status:   [2]  Social History     Tobacco Use     Smoking status: Former Smoker     Types: Cigarettes     Smokeless tobacco: Never Used     Tobacco comment: Tried to Quit (YES); QUIT in 1971; Passive Exposure (NO)   Substance Use Topics     Alcohol use: Yes     Comment: RARELY     Drug use: No        Medications:      aspirin 81 MG tablet   atorvastatin (LIPITOR) 10 MG tablet   diazepam (VALIUM) 5 MG tablet   losartan (COZAAR) 50 MG tablet   meclizine (ANTIVERT) 25 MG tablet   EPINEPHrine (EPIPEN) 0.3 MG/0.3ML injection   fluticasone (FLONASE) 50 MCG/ACT nasal spray   FLUZONE HIGH-DOSE 0.5 ML injection         Review of Systems   Constitutional: Negative for chills and fever.   HENT: Negative for sore throat.    Eyes: Negative for redness and visual disturbance.   Respiratory: Negative.    Cardiovascular: Negative.    Gastrointestinal: Positive for nausea and vomiting.   Musculoskeletal: Negative for neck stiffness.   Skin: Negative for rash.   Neurological: Positive for dizziness. Negative for tremors, seizures, syncope, facial asymmetry, speech difficulty, weakness and numbness.   All other systems reviewed and are negative.      Physical Exam   BP: 148/84  Pulse: 72  Heart Rate: 68  Temp: 96.8  F (36  C)  Resp: 20  Height: 160 cm (5' 3\")  Weight: 70.8 kg (156 lb)  SpO2: 97 %      Physical Exam   Constitutional: She is oriented to person, place, and time. She appears well-developed and well-nourished.  Non-toxic appearance. She does not have a sickly appearance. She does not appear ill. She appears distressed.   Eyes closed. Distressed due to dizziness/nausea.    HENT:   Head: Normocephalic and atraumatic.   Right Ear: " Hearing, tympanic membrane, external ear and ear canal normal.   Left Ear: Hearing, tympanic membrane, external ear and ear canal normal.   Nose: Nose normal. Right sinus exhibits no maxillary sinus tenderness and no frontal sinus tenderness. Left sinus exhibits no maxillary sinus tenderness and no frontal sinus tenderness.   Mouth/Throat: Uvula is midline, oropharynx is clear and moist and mucous membranes are normal. No oropharyngeal exudate.   Eyes: Conjunctivae are normal. Pupils are equal, round, and reactive to light. Right eye exhibits no discharge. Left eye exhibits no discharge. No scleral icterus. Right eye exhibits nystagmus (Horizontal nystagmus when gazing to the right ). Left eye exhibits nystagmus.   Fundoscopic exam:       The right eye shows no papilledema.        The left eye shows no papilledema.   Neck: Trachea normal, normal range of motion and full passive range of motion without pain. Neck supple. No Brudzinski's sign and no Kernig's sign noted. No thyroid mass and no thyromegaly present.   Cardiovascular: Normal rate, regular rhythm, normal heart sounds and intact distal pulses. Exam reveals no gallop and no friction rub.   No murmur heard.  Pulmonary/Chest: Effort normal and breath sounds normal. No respiratory distress. She has no wheezes. She has no rales. She exhibits no tenderness.   Abdominal: Soft. Bowel sounds are normal. There is no tenderness.   Musculoskeletal: Normal range of motion. She exhibits no edema.   Lymphadenopathy:     She has no cervical adenopathy.   Neurological: She is alert and oriented to person, place, and time. She has normal strength and normal reflexes. She displays no atrophy, no tremor and normal reflexes. No cranial nerve deficit or sensory deficit. She exhibits normal muscle tone. She displays a negative Romberg sign. She displays no seizure activity. Coordination and gait normal. GCS eye subscore is 4. GCS verbal subscore is 5. GCS motor subscore is 6.    Skin: Skin is warm and dry. Capillary refill takes less than 2 seconds. No rash noted. She is not diaphoretic. No erythema. No pallor.   Psychiatric: She has a normal mood and affect. Her behavior is normal. Judgment and thought content normal.   Nursing note and vitals reviewed.      ED Course        Procedures          EKG: Sinus rhythm with 1st degree AV block which is new when compared to prior EKG in 206. No ST-T changes.     Results for orders placed or performed during the hospital encounter of 12/31/18 (from the past 24 hour(s))   CBC with platelets differential   Result Value Ref Range    WBC 5.4 4.0 - 11.0 10e9/L    RBC Count 3.53 (L) 3.8 - 5.2 10e12/L    Hemoglobin 10.7 (L) 11.7 - 15.7 g/dL    Hematocrit 31.7 (L) 35.0 - 47.0 %    MCV 90 78 - 100 fl    MCH 30.3 26.5 - 33.0 pg    MCHC 33.8 31.5 - 36.5 g/dL    RDW 14.6 10.0 - 15.0 %    Platelet Count 225 150 - 450 10e9/L    Diff Method Automated Method     % Neutrophils 45.5 %    % Lymphocytes 39.1 %    % Monocytes 11.4 %    % Eosinophils 1.5 %    % Basophils 0.6 %    % Immature Granulocytes 1.9 %    Nucleated RBCs 0 0 /100    Absolute Neutrophil 2.5 1.6 - 8.3 10e9/L    Absolute Lymphocytes 2.1 0.8 - 5.3 10e9/L    Absolute Monocytes 0.6 0.0 - 1.3 10e9/L    Absolute Eosinophils 0.1 0.0 - 0.7 10e9/L    Absolute Basophils 0.0 0.0 - 0.2 10e9/L    Abs Immature Granulocytes 0.1 0 - 0.4 10e9/L    Absolute Nucleated RBC 0.0    Comprehensive metabolic panel   Result Value Ref Range    Sodium 130 (L) 133 - 144 mmol/L    Potassium 3.6 3.4 - 5.3 mmol/L    Chloride 100 94 - 109 mmol/L    Carbon Dioxide 23 20 - 32 mmol/L    Anion Gap 7 3 - 14 mmol/L    Glucose 130 (H) 70 - 99 mg/dL    Urea Nitrogen 16 7 - 30 mg/dL    Creatinine 0.70 0.52 - 1.04 mg/dL    GFR Estimate 88 >60 mL/min/[1.73_m2]    GFR Estimate If Black >90 >60 mL/min/[1.73_m2]    Calcium 8.1 (L) 8.5 - 10.1 mg/dL    Bilirubin Total 0.3 0.2 - 1.3 mg/dL    Albumin 3.0 (L) 3.4 - 5.0 g/dL    Protein Total 10.2  (H) 6.8 - 8.8 g/dL    Alkaline Phosphatase 111 40 - 150 U/L    ALT 21 0 - 50 U/L    AST 21 0 - 45 U/L   D-Dimer ([facility])   Result Value Ref Range    D-Dimer ng/mL 204 0 - 300 ng/ml D-DU   Troponin I   Result Value Ref Range    Troponin I ES <0.015 0.000 - 0.045 ug/L   Chest  XR, 1 view portable    Narrative    XR CHEST PORT 1 VW    HISTORY: 68 yearsFemale dizziness    TECHNIQUE: A single view of the chest was performed    COMPARISON: 3/29/2013    FINDINGS: Heart size and pulmonary vascularity are within normal  limits. Lungs are clear. No consolidating airspace opacities are  present.      Impression    IMPRESSION: Clear chest    ADAM CARRILLO MD   Head CT w/o contrast    Narrative    PROCEDURE: CT HEAD W/O CONTRAST     HISTORY: See the Clinical Information for Interpreting Provider;  dizziness.    COMPARISON: None.    TECHNIQUE:  Helical images of the head from the foramen magnum to the  vertex were obtained without contrast.    FINDINGS: The ventricular system is normal in size. There is some  white matter low-density consistent with small vessel disease. There  are no masses ventricular shifts or extracerebral collections. The  brainstem and cerebellum appear normal. Cranial vault is intact. The  paranasal sinuses are clear.  The visualized paranasal sinuses are  clear. No abnormal fluid was seen in the middle ear cavity or  mastoids.      Impression    IMPRESSION: No masses or ventricular shifts      ROSEMARIE NAPOLES MD   Troponin I (second draw)   Result Value Ref Range    Troponin I ES <0.015 0.000 - 0.045 ug/L       Medications   diazepam (VALIUM) injection 5 mg (2.5 mg Intravenous Given 12/31/18 1130)   meclizine (ANTIVERT) tablet 25 mg (25 mg Oral Given 12/31/18 1136)       Assessments & Plan (with Medical Decision Making)   Carmelita presents with sudden onset of dizziness after bending over this am. She is distressed upon arrival, eyes closed, vomiting. She feels back to normal after Valium 2.5mg IV and  meclizine 25mg PO. Head CT negative for acute findings. Cardiac work up negative other than new 1st degree AV block on EKG, change from 2016. For this reason, outpatient stress test was ordered. PT referral for vertigo ordered as well. RX for valium and meclizine given. She is stable for discharged home at this time, plan below.     Plan: PT should be calling you to schedule a therapy appointment for your vertigo. Our stress test should be calling you to schedule an exercise stress test. Take the Meclizine as prescribed for dizziness. You may take the Valium as prescribed for dizziness not improved by the meclizine, no driving while taking this. Follow up with primary care in 3 days for re-check. Please return here sooner with any new or worsening symptoms.     I have reviewed the nursing notes.    I have reviewed the findings, diagnosis, plan and need for follow up with the patient.       Medication List      Started    diazepam 5 MG tablet  Commonly known as:  VALIUM  2.5-5 mg, Oral, EVERY 6 HOURS PRN     meclizine 25 MG tablet  Commonly known as:  ANTIVERT  25 mg, Oral, 3 TIMES DAILY PRN            Final diagnoses:   Benign paroxysmal positional vertigo, right   Dizziness   1st degree AV block       12/31/2018   [facility]     Carmelita Mendoza PA-C  12/31/18 3225

## 2019-01-02 ENCOUNTER — TELEPHONE (OUTPATIENT)
Dept: FAMILY MEDICINE | Facility: OTHER | Age: 69
End: 2019-01-02

## 2019-01-02 NOTE — TELEPHONE ENCOUNTER
10:31 AM    Reason for Call: OVERBOOK    Patient is having the following symptoms: vertigo attack for 3  days.    The patient is requesting an appointment for asap with provider.    Was an appointment offered for this call? No  If yes : Appointment type              Date    Preferred method for responding to this message: Telephone Call  What is your phone number ?[phone]    If we cannot reach you directly, may we leave a detailed response at the number you provided? Yes    Can this message wait until your PCP/provider returns, if unavailable today? Not applicable, provider is in    Nikki Shipley

## 2019-01-03 ENCOUNTER — OFFICE VISIT (OUTPATIENT)
Dept: FAMILY MEDICINE | Facility: OTHER | Age: 69
End: 2019-01-03
Attending: PHYSICIAN ASSISTANT
Payer: COMMERCIAL

## 2019-01-03 VITALS
TEMPERATURE: 98.1 F | DIASTOLIC BLOOD PRESSURE: 78 MMHG | BODY MASS INDEX: 28.17 KG/M2 | WEIGHT: 159 LBS | HEART RATE: 83 BPM | HEIGHT: 63 IN | RESPIRATION RATE: 17 BRPM | OXYGEN SATURATION: 95 % | SYSTOLIC BLOOD PRESSURE: 120 MMHG

## 2019-01-03 DIAGNOSIS — I10 BENIGN ESSENTIAL HYPERTENSION: ICD-10-CM

## 2019-01-03 DIAGNOSIS — E87.1 LOW SODIUM LEVELS: Primary | ICD-10-CM

## 2019-01-03 DIAGNOSIS — E78.2 MIXED HYPERLIPIDEMIA: ICD-10-CM

## 2019-01-03 DIAGNOSIS — R42 VERTIGO: ICD-10-CM

## 2019-01-03 DIAGNOSIS — F32.1 CURRENT MODERATE EPISODE OF MAJOR DEPRESSIVE DISORDER WITHOUT PRIOR EPISODE (H): ICD-10-CM

## 2019-01-03 DIAGNOSIS — I10 ESSENTIAL HYPERTENSION WITH GOAL BLOOD PRESSURE LESS THAN 140/90: ICD-10-CM

## 2019-01-03 LAB
CHOLEST SERPL-MCNC: 153 MG/DL
HDLC SERPL-MCNC: 42 MG/DL
LDLC SERPL CALC-MCNC: 86 MG/DL
NONHDLC SERPL-MCNC: 111 MG/DL
TRIGL SERPL-MCNC: 126 MG/DL

## 2019-01-03 PROCEDURE — 80061 LIPID PANEL: CPT | Mod: ZL | Performed by: PHYSICIAN ASSISTANT

## 2019-01-03 PROCEDURE — G0463 HOSPITAL OUTPT CLINIC VISIT: HCPCS

## 2019-01-03 PROCEDURE — 36415 COLL VENOUS BLD VENIPUNCTURE: CPT | Mod: ZL | Performed by: PHYSICIAN ASSISTANT

## 2019-01-03 PROCEDURE — 99214 OFFICE O/P EST MOD 30 MIN: CPT | Performed by: PHYSICIAN ASSISTANT

## 2019-01-03 RX ORDER — CITALOPRAM HYDROBROMIDE 20 MG/1
20 TABLET ORAL DAILY
Qty: 90 TABLET | Refills: 3 | Status: SHIPPED | OUTPATIENT
Start: 2019-01-03 | End: 2019-10-03 | Stop reason: SINTOL

## 2019-01-03 RX ORDER — LOSARTAN POTASSIUM 50 MG/1
50 TABLET ORAL DAILY
Qty: 90 TABLET | Refills: 3 | Status: SHIPPED | OUTPATIENT
Start: 2019-01-03 | End: 2019-11-14

## 2019-01-03 RX ORDER — ATORVASTATIN CALCIUM 10 MG/1
10 TABLET, FILM COATED ORAL DAILY
Qty: 90 TABLET | Refills: 3 | Status: SHIPPED | OUTPATIENT
Start: 2019-01-03 | End: 2020-02-14

## 2019-01-03 ASSESSMENT — ANXIETY QUESTIONNAIRES
6. BECOMING EASILY ANNOYED OR IRRITABLE: SEVERAL DAYS
4. TROUBLE RELAXING: NOT AT ALL
GAD7 TOTAL SCORE: 3
2. NOT BEING ABLE TO STOP OR CONTROL WORRYING: NOT AT ALL
3. WORRYING TOO MUCH ABOUT DIFFERENT THINGS: SEVERAL DAYS
1. FEELING NERVOUS, ANXIOUS, OR ON EDGE: SEVERAL DAYS
7. FEELING AFRAID AS IF SOMETHING AWFUL MIGHT HAPPEN: NOT AT ALL
IF YOU CHECKED OFF ANY PROBLEMS ON THIS QUESTIONNAIRE, HOW DIFFICULT HAVE THESE PROBLEMS MADE IT FOR YOU TO DO YOUR WORK, TAKE CARE OF THINGS AT HOME, OR GET ALONG WITH OTHER PEOPLE: NOT DIFFICULT AT ALL
5. BEING SO RESTLESS THAT IT IS HARD TO SIT STILL: NOT AT ALL

## 2019-01-03 ASSESSMENT — MIFFLIN-ST. JEOR: SCORE: 1210.35

## 2019-01-03 ASSESSMENT — PAIN SCALES - GENERAL: PAINLEVEL: NO PAIN (0)

## 2019-01-03 ASSESSMENT — PATIENT HEALTH QUESTIONNAIRE - PHQ9: SUM OF ALL RESPONSES TO PHQ QUESTIONS 1-9: 5

## 2019-01-03 NOTE — NURSING NOTE
"Chief Complaint   Patient presents with     Dizziness       Initial /78 (BP Location: Left arm, Patient Position: Sitting, Cuff Size: Adult Regular)   Pulse 83   Temp 98.1  F (36.7  C) (Tympanic)   Resp 17   Ht 1.6 m (5' 3\")   Wt 72.1 kg (159 lb)   SpO2 95%   BMI 28.17 kg/m   Estimated body mass index is 28.17 kg/m  as calculated from the following:    Height as of this encounter: 1.6 m (5' 3\").    Weight as of this encounter: 72.1 kg (159 lb).  Medication Reconciliation: complete    Davida Curiel LPN  "

## 2019-01-03 NOTE — PROGRESS NOTES
"  SUBJECTIVE:   Carmelita Watts is a 68 year old female who presents to clinic today for the following health issues:      Dizziness      Duration: 1 episode on December 31st    Description   Feeling faint:  no   Feeling like the surroundings are moving: no   Loss of consciousness or falls: no     Intensity:  mild    Accompanying signs and symptoms:   Nausea/vomitting: yes  Palpitations: no   Weakness in arms or legs: yes  Vision or speech changes: no   Ringing in ears (Tinnitus): YES  Hearing loss related to dizziness: no   Other (fevers/chills/sweating/dyspnea): no     History (similar episodes/head trauma/previous evaluation/recent bleeding): None    Precipitating or alleviating factors (new meds/chemicals): None  Worse with activity/head movement: YES    Therapies tried and outcome: None          Problem list and histories reviewed & adjusted, as indicated.  Additional history: as documented    Patient Active Problem List   Diagnosis     Hyperlipidemia LDL goal <130     Hypertension goal BP (blood pressure) < 140/80     Advanced care planning/counseling discussion     Major depressive disorder, recurrent episode, mild (H)     Seasonal allergies     Mixed hyperlipidemia     ACP (advance care planning)     Past Surgical History:   Procedure Laterality Date     APPENDECTOMY       CHOLECYSTECTOMY       COLONOSCOPY  07-    repeat 10 years     COLONOSCOPY N/A 12/30/2016    Procedure: COLONOSCOPY;  Surgeon: Bhaskar Franklin DO;  Location: [facility]     SINUS SURGERY       TUBAL STERILIZATION         Social History     Tobacco Use     Smoking status: Former Smoker     Types: Cigarettes     Smokeless tobacco: Never Used     Tobacco comment: Tried to Quit (YES); QUIT in 1971; Passive Exposure (NO)   Substance Use Topics     Alcohol use: Yes     Comment: RARELY     Family History   Problem Relation Age of Onset     Breast Cancer Mother 66        Cause of Death     Parkinsonism Father         \"Possible\"     "     Current Outpatient Medications   Medication Sig Dispense Refill     aspirin 81 MG tablet Take 81 mg by mouth daily       atorvastatin (LIPITOR) 10 MG tablet Take 1 tablet (10 mg) by mouth daily 90 tablet 3     EPINEPHrine (EPIPEN) 0.3 MG/0.3ML injection Inject 0.3 mg into the muscle once as needed. Use as needed for anaphylaxis.       fluticasone (FLONASE) 50 MCG/ACT nasal spray SPRAY 2 SPRAYS INTO BOTH NOSTRILS DAILY 48 mL 0     FLUZONE HIGH-DOSE 0.5 ML injection Inject 1 Dose as directed once  0     losartan (COZAAR) 50 MG tablet Take 1 tablet (50 mg) by mouth daily 90 tablet 0     meclizine (ANTIVERT) 25 MG tablet Take 1 tablet (25 mg) by mouth 3 times daily as needed for dizziness 30 tablet 0     diazepam (VALIUM) 5 MG tablet Take 0.5-1 tablets (2.5-5 mg) by mouth every 6 hours as needed (dizziness/vertigo) (Patient not taking: Reported on 1/3/2019) 20 tablet 0     Allergies   Allergen Reactions     Lisinopril Cough     Phenylephrine Hcl Other (See Comments)     **Entex  HEADACHE (SEVERE)     Phenylpropanolamine Other (See Comments)     **Entex  HEADACHE (SEVERE)     Pseudoephedrine Tannate Other (See Comments)     **Entex  HEADACHE (SEVERE)     Levofloxacin Rash     **Levaquin     Moxifloxacin Hcl [Avelox] Rash     Recent Labs   Lab Test 12/31/18  1111 11/17/17  0844 10/26/16  0921 10/01/15  1015   LDL  --  108* 109* 115   HDL  --  60 54 56   TRIG  --  78 115 110   ALT 21 27 21 25   CR 0.70 0.69 0.78 0.76   GFRESTIMATED 88 84 74 76   GFRESTBLACK >90 >90 90 >90  African American GFR Calc     POTASSIUM 3.6 4.1 3.8 4.2   TSH  --  1.77 2.08 1.72      BP Readings from Last 3 Encounters:   01/03/19 120/78   12/31/18 123/75   01/07/18 138/95    Wt Readings from Last 3 Encounters:   01/03/19 72.1 kg (159 lb)   12/31/18 70.8 kg (156 lb)   11/17/17 70.8 kg (156 lb)                    Reviewed and updated as needed this visit by clinical staff       Reviewed and updated as needed this visit by Provider      "    ROS:  Constitutional, HEENT, cardiovascular, pulmonary, gi and gu systems are negative, except as otherwise noted.    OBJECTIVE:                                                    /78 (BP Location: Left arm, Patient Position: Sitting, Cuff Size: Adult Regular)   Pulse 83   Temp 98.1  F (36.7  C) (Tympanic)   Resp 17   Ht 1.6 m (5' 3\")   Wt 72.1 kg (159 lb)   SpO2 95%   BMI 28.17 kg/m    Body mass index is 28.17 kg/m .  GENERAL APPEARANCE: healthy, alert and no distress  EYES: Eyes grossly normal to inspection, PERRL and conjunctivae and sclerae normal  HENT: ear canals and TM's normal and nose and mouth without ulcers or lesions  NECK: no adenopathy, no asymmetry, masses, or scars and thyroid normal to palpation  RESP: lungs clear to auscultation - no rales, rhonchi or wheezes  CV: regular rates and rhythm, normal S1 S2, no S3 or S4 and no murmur, click or rub  LYMPHATICS: no cervical adenopathy  SKIN: no suspicious lesions or rashes  NEURO: Normal strength and tone, mentation intact and speech normal  PSYCH: mentation appears normal and affect normal/bright    Diagnostic test results:  Diagnostic Test Results:  Results for orders placed or performed in visit on 01/03/19 (from the past 24 hour(s))   Lipid Profile   Result Value Ref Range    Cholesterol 153 <200 mg/dL    Triglycerides 126 <150 mg/dL    HDL Cholesterol 42 (L) >49 mg/dL    LDL Cholesterol Calculated 86 <100 mg/dL    Non HDL Cholesterol 111 <130 mg/dL        ASSESSMENT/PLAN:                                                    1. Low sodium levels  New for her has been on full dose of serotonin specific reuptake inhibitor could be the reason. We will recheck this. If this continues will request to go back on Lexapro from insurance.     2. Vertigo  Liking meclizine and not liking Valium. Will continue to see if sx are better life right now is not able to get her to PT   Discussion on dietary changes.      3. Mixed hyperlipidemia  She is " going to be given refill and labs are done.   - Lipid Profile      4. Benign essential hypertension  Borderline blood pressure. She is going to work on stress reduction. Stressors right now are overwhelming     5. Essential hypertension with goal blood pressure less than 140/90  Refill given. Due for follow up on this. Could be better a  Lot of personal stressors.   - losartan (COZAAR) 50 MG tablet; Take 1 tablet (50 mg) by mouth daily  Dispense: 90 tablet; Refill: 3    6. Current moderate episode of major depressive disorder without prior episode (H)  Given refill. Was on Lexapro but got changed to Celexa. Will start with 1/2 pill and increase as needed.   - citalopram (CELEXA) 20 MG tablet; Take 1 tablet (20 mg) by mouth daily  Dispense: 90 tablet; Refill: 3        See Patient Instructions    VENKATESH Trevino  [facility]

## 2019-01-03 NOTE — PATIENT INSTRUCTIONS
Thank you for choosing [facility].   I have office hours 8:00 am to 4:30 pm on Monday's, Wednesday's, Thursday's and Friday's. My nurse and I are out of the office every Tuesday.    Following your visit, when your labs and diagnostic testing have returned, I will review then and you will be contacted by my nurse.  If you are on My Chart, you can also view results there.    For refills, notify your pharmacy regarding what you need and the pharmacy will generate a refill request. Do not call my nurse as she is unable to process refill request. Please plan ahead and allow 3-5 days for refill requests.    You will generally receive a reminder call the day prior to your appointment.  If you cannot attend your appointment, please cancel your appointment with as much notice as possible.  If there is a pattern of failure to present for your appointments, I cannot provide consistent, meaningful, ongoing care for you. It is very important to me that you come in for your care, so we can best assist you with your health care needs.    IMPORTANT:  Please note that it is my standard of practice to NOT participate in prescribing ongoing requested Narcotic Analgesic therapy, and/or participate in the prescribing of other controlled substances.  My nurse and I am happy to assist you with the process of referral for alternative pain management as needed, and other treatment modalities including but not limited to:  Physical Therapy, Physical Medicine and Rehab, Counseling, Chiropractic Care, Orthopedic Care, and non-narcotic medication management.     In the event that you need to be seen for emergent concerns and I am out of office,  please see one of my colleagues for acute concerns.  You may also present to  or ER.  I appreciate the opportunity to serve you and look forward to supporting your healthcare needs in the future. Please contact me with any questions or concerns that you may  have.    Sincerely,      Kenyatta Trujillo RN, PA-C

## 2019-01-04 ASSESSMENT — ANXIETY QUESTIONNAIRES: GAD7 TOTAL SCORE: 3

## 2019-01-23 ENCOUNTER — HOSPITAL ENCOUNTER (OUTPATIENT)
Dept: PHYSICAL THERAPY | Facility: HOSPITAL | Age: 69
Setting detail: THERAPIES SERIES
End: 2019-01-23
Attending: PHYSICIAN ASSISTANT
Payer: MEDICARE

## 2019-01-23 DIAGNOSIS — H81.11 BENIGN PAROXYSMAL POSITIONAL VERTIGO, RIGHT: ICD-10-CM

## 2019-01-23 PROCEDURE — 97530 THERAPEUTIC ACTIVITIES: CPT | Mod: GP | Performed by: PHYSICAL THERAPIST

## 2019-01-23 PROCEDURE — 97112 NEUROMUSCULAR REEDUCATION: CPT | Mod: GP | Performed by: PHYSICAL THERAPIST

## 2019-01-23 PROCEDURE — 97161 PT EVAL LOW COMPLEX 20 MIN: CPT | Mod: GP | Performed by: PHYSICAL THERAPIST

## 2019-01-23 NOTE — PROGRESS NOTES
" 01/23/19 0814   Quick Adds   Quick Adds Vestibular Eval;Certification   Type of Visit Initial OP PT Evaluation   General Information   Start of Care Date 01/23/19   Referring Physician Carmelita Mendoza PA-C   Orders Evaluate and Treat as Indicated   Order Date 12/31/18   Medical Diagnosis BPPV, right ear   Onset of illness/injury or Date of Surgery 12/31/18   Surgical/Medical history reviewed Yes   Pertinent history of current vestibular problem (include personal factors and/or comorbidities that impact the POC)  Anxiety   Pertinent history of current problem (include personal factors and/or comorbidities that impact the POC) Pt states on 12/31/18 she was in her bathroom scrubbing her floors and had episode of dizziness while bending over.  Pt states she felt dizzy, denied room spinning or self spinning.  Pt felt she just couldn't get up.  Pt states she had trouble lifting her head off of the floor, was throwing up into a towel because she couldn't pick her head up to get it into a bucket.  Her family called the ambulance and she was brought into the ER.  Pt was given meclizine and valium and states it made her feel real drugged so didn't continue with beyond ED besides 1 additional time.  Pt states she was in ED for about 5 hours, states the dizziness was gone, she just felt a little \"off\" and weak.  Pt states a couple days ago she did take a meclizine because she though maybe she was starting to have issues, states it dulled her feeling and she felt better.  Pt states she has a lot of stress in her home right now as her  is suffering from pancreatic cancer.  Pt denies any ringing in her ear during episode on 12/31/18.  Pt states at times she feels her right ear feels a little plugged, states this is a chronic issue.  Pt states she has issued with sinuses in her past but has been good in the past 8 years.  Pt states she is feeling basically back to her normal self.  Pt denies any dizziness with rolling in " bed or going from supine<>sit.  Pt states she has adapted to scrubbing floors without looking down.  Pt states she is quite active, goes to the gym.  Pt states she has had some off/on stomach issues she relates to stress and anxiety.     Pertinent Visual History  wears bifocals all the time   Prior level of functional mobility Transfers;Ambulation;ADL   Transfers independent   Ambulation independent   ADL independent   Diagnostic Tests CT Scan   CT Results unremarkable   Previous/Current Treatment Medication(s)   Improvement after medication No   Patient role/Employment history Retired   Living environment House/[facility]   Home/Community Accessibility Comments stairs to basement, 4 steps to enter   Patient/Family Goals Statement follow through with testing   General Information Comments Pt denies any dizziness associated with change in vision, hearing, speech, fine motor coordination, swallowing, sensation, or strength   Fall Risk Screen   Fall screen completed by PT   Have you fallen 2 or more times in the past year? No   Have you fallen and had an injury in the past year? No   Is patient a fall risk? No   System Outcome Measures   Outcome Measures BPPV   Dizziness Handicap Inventory (score out of 100) A decrease in score by 17.18 or greater indicates a clinically significant change in symptoms. 4   Pain   Patient currently in pain No   Cognitive Status Examination   Orientation orientation to person, place and time   Level of Consciousness alert   Follows Commands and Answers Questions 100% of the time   Personal Safety and Judgment intact   Memory intact   Posture   Posture Forward head position   Range of Motion (ROM)   ROM Comment Bilateral LE AROM WFL, Bilateral UE AROM WFL   Strength   Strength Comments Bilateral UE/LE strength WFL throughout   Bed Mobility   Bed Mobility Comments independent   Transfer Skills   Transfer Comments independent   Locomotion   Wheel Chair Mobility Comments independent   Gait    Gait Comments Ambulates independently without AD, no LOB or instability, good gait speed   Gait Special Tests   Gait Special Tests DYNAMIC GAIT INDEX   Gait Special Tests Dynamic Gait Index   Score out of 24 22   Balance Special Tests   Balance Special Tests Modified CTSIB Conditions   Balance Special Tests Modified CTSIB Conditions   Condition 1, seconds 30 Seconds   Condition 2, seconds 30 Seconds   Condition 4, seconds 30 Seconds   Condition 5, seconds 12 Seconds   Modified CTSIB Comments Increased sway noted with condition 4 but able to maintain balance, increased sway with LOB at 12 seconds, able to correct and continue through to 30 seconds with additional LOB   Sensory Examination   Sensory Perception no deficits were identified   Cervicogenic Screen   Neck ROM WNL   Vertebral Artery Test Normal   Oculomotor Exam   Smooth Pursuit Normal   Saccades Normal   VOR Normal   VOR Cancellation Normal   Rapid Head Thrust Corrective Saccade R head thrust   Convergence Testing Normal   Infrared Goggle Exam or Frenzel Lense Exam   Vestibular Suppressant in Last 24 Hours? No   Exam completed with Frenzel Lenses   Spontaneous Nystagmus Negative   Gaze Evoked Nystagmus Negative   Head Shake Horizontal Nystagmus Negative   [place]-Hallpike (right) Negative   Antoine-Hallpike (Left) Negative   HSCC Supine Roll Test (Right) Negative   HSCC Supine Roll Test (Left) Negative   Dynamic Visual Acuity (DVA)   Static Acuity (LogMar) 10   Horizontal Head Movement at 2 Hz (LogMar) 9   DVA Comments no reports of symptoms   Planned Therapy Interventions   Planned Therapy Interventions neuromuscular re-education;other (see comments)   Planned Therapy Interventions Comment education   Clinical Impression   Criteria for Skilled Therapeutic Interventions Met yes, treatment indicated   PT Diagnosis episode of dizziness with fear of activity   Influenced by the following impairments dizziness, impaired balance   Functional limitations due to  impairments fear of mobility and returning to normal daily activities   Clinical Presentation Stable/Uncomplicated   Clinical Presentation Rationale clinical judgement   Clinical Decision Making (Complexity) Low complexity   Therapy Frequency 1 time/week   Predicted Duration of Therapy Intervention (days/wks) 1 day   Risk & Benefits of therapy have been explained Yes   Patient, Family & other staff in agreement with plan of care Yes   Clinical Impression Comments Pt presents after 1 episode of dizziness on 12/31/18.  Pt reports a nearly complete resolution of symptoms since that time, states she was feeling back to herself basically at the time of discharge from the ED.  Pt is negative for all positional testing today.  Pt did have a positive HIT to the right indicating a possibly hypofunction on the right.  No spontaneous nystagmus or gaze evoked nystagmus present.  At this time pt is not having and residual symptoms but does state she has been avoiding bending over for fear that it would provoke further symptoms.  Pt has a lot of stressors in her life right now and has stopped doing her daily exercises she was doing prior to 's diagnosis in October 2018.     Education Assessment   Preferred Learning Style Listening   Barriers to Learning No barriers   GOALS   PT Eval Goals 1   Goal 1   Goal Identifier STG 1   Goal Description Pt to be instructed in balance HEP and verbalize understanding of HEP.   Target Date 01/23/19   Total Evaluation Time   PT Eval, Low Complexity Minutes (50600) 34   Therapy Certification   Certification date from 01/23/19   Certification date to 01/23/19   Medical Diagnosis BPPV, right ear   Certification I certify the need for these services furnished under this plan of treatment and while under my care.  (Physician co-signature of this document indicates review and certification of the therapy plan).   I certify the need for these services furnished under this plan of treatment and  while under my care. (Physician co-signature of this document indicates review and certification of the therapy plan).

## 2019-02-20 ENCOUNTER — HOSPITAL ENCOUNTER (OUTPATIENT)
Dept: NUCLEAR MEDICINE | Facility: HOSPITAL | Age: 69
Setting detail: NUCLEAR MEDICINE
End: 2019-02-20
Attending: PHYSICIAN ASSISTANT
Payer: MEDICARE

## 2019-02-20 ENCOUNTER — HOSPITAL ENCOUNTER (OUTPATIENT)
Dept: CARDIOLOGY | Facility: HOSPITAL | Age: 69
Setting detail: NUCLEAR MEDICINE
End: 2019-02-20
Attending: PHYSICIAN ASSISTANT
Payer: MEDICARE

## 2019-02-20 ENCOUNTER — HOSPITAL ENCOUNTER (OUTPATIENT)
Dept: NUCLEAR MEDICINE | Facility: HOSPITAL | Age: 69
Setting detail: NUCLEAR MEDICINE
Discharge: HOME | End: 2019-02-20
Attending: PHYSICIAN ASSISTANT | Admitting: PHYSICIAN ASSISTANT
Payer: MEDICARE

## 2019-02-20 DIAGNOSIS — I44.0 1ST DEGREE AV BLOCK: ICD-10-CM

## 2019-02-20 DIAGNOSIS — R42 DIZZINESS: ICD-10-CM

## 2019-02-20 PROCEDURE — 93017 CV STRESS TEST TRACING ONLY: CPT

## 2019-02-20 PROCEDURE — 34300033 ZZH RX 343: Performed by: RADIOLOGY

## 2019-02-20 PROCEDURE — A9500 TC99M SESTAMIBI: HCPCS | Performed by: RADIOLOGY

## 2019-02-20 PROCEDURE — 93016 CV STRESS TEST SUPVJ ONLY: CPT | Performed by: INTERNAL MEDICINE

## 2019-02-20 PROCEDURE — 78452 HT MUSCLE IMAGE SPECT MULT: CPT | Mod: TC

## 2019-02-20 PROCEDURE — 25800030 ZZH RX IP 258 OP 636: Performed by: INTERNAL MEDICINE

## 2019-02-20 PROCEDURE — 93018 CV STRESS TEST I&R ONLY: CPT | Performed by: INTERNAL MEDICINE

## 2019-02-20 RX ORDER — SODIUM CHLORIDE 9 MG/ML
INJECTION, SOLUTION INTRAVENOUS ONCE
Status: COMPLETED | OUTPATIENT
Start: 2019-02-20 | End: 2019-02-20

## 2019-02-20 RX ADMIN — Medication 10 MILLICURIE: at 07:00

## 2019-02-20 RX ADMIN — Medication 30 MILLICURIE: at 09:13

## 2019-02-20 RX ADMIN — SODIUM CHLORIDE: 9 INJECTION, SOLUTION INTRAVENOUS at 08:56

## 2019-04-27 NOTE — PROGRESS NOTES
Outpatient Physical Therapy Discharge Note     Patient: Carmelita Watts  : 1950    Beginning/End Dates of Reporting Period:  2019 to 2019    Referring Provider: Carmelita Mendoza PA-C    Therapy Diagnosis: episodes of dizziness with fear of activity     Client Self Report: See PT eval    Objective Measurements:      Outcome Measures (most recent score):  Dizziness Handicap Inventory (score out of 100) A decrease in score by 17.18 or greater indicates a clinically significant change in symptoms.: 4         Goals:  Goal Identifier STG 1   Goal Description Pt to be instructed in balance HEP and verbalize understanding of HEP.   Target Date 19   Date Met      Progress:       Progress Toward Goals:   Pt presents after 1 episode of dizziness on 18.  Pt reports a nearly complete resolution of symptoms since that time, states she was feeling back to herself basically at the time of discharge from the ED.  Pt is negative for all positional testing today.  Pt did have a positive HIT to the right indicating a possibly hypofunction on the right.  No spontaneous nystagmus or gaze evoked nystagmus present.  At this time pt is not having and residual symptoms but does state she has been avoiding bending over for fear that it would provoke further symptoms.  Pt has a lot of stressors in her life right now and has stopped doing her daily exercises she was doing prior to 's diagnosis in 2018.  Pt provided with balance exercises and no plan for further skilled PT unless issues returned.    Plan:  Discharge from therapy.    Discharge:    Reason for Discharge: Patient has met all goals.    Equipment Issued:     Discharge Plan: Patient to continue home program.

## 2019-05-01 NOTE — PROGRESS NOTES
SUBJECTIVE:   Carmelita Watts is a 68 year old female who presents to clinic today for the following   health issues:      Dizziness      Duration:  Started in October was under stress after her  was diagnosed with pancreatic cancer . Had not been eating the greatest , very little hydration .     Description   Feeling faint:  no   Feeling like the surroundings are moving: no   Loss of consciousness or falls: no     Intensity:  Mild enough to get her attention .     Accompanying signs and symptoms:   Nausea/vomitting: had nausea with the one episode of dizziness in October   Palpitations: no   Weakness in arms or legs: no   Vision or speech changes: no   Ringing in ears (Tinnitus): no   Hearing loss related to dizziness: no   Other (fevers/chills/sweating/dyspnea): no     History (similar episodes/head trauma/previous evaluation/recent bleeding): None    Precipitating or alleviating factors (new meds/chemicals): was given meclizine for vertigo . Symptoms were relieved with 12.5 mg .   Worse with activity/head movement: no     Therapies tried and outcome: Meclizine 12.5 mg also had PT           Additional history: as documented    Reviewed  and updated as needed this visit by clinical staff  Tobacco  Allergies  Meds  Med Hx  Surg Hx  Fam Hx  Soc Hx        Reviewed and updated as needed this visit by Provider         Patient Active Problem List   Diagnosis     Hyperlipidemia LDL goal <130     Hypertension goal BP (blood pressure) < 140/80     Advanced care planning/counseling discussion     Major depressive disorder, recurrent episode, mild (H)     Seasonal allergies     Mixed hyperlipidemia     ACP (advance care planning)     Past Surgical History:   Procedure Laterality Date     APPENDECTOMY       CHOLECYSTECTOMY       COLONOSCOPY  07-    repeat 10 years     COLONOSCOPY N/A 12/30/2016    Procedure: COLONOSCOPY;  Surgeon: Bhaskar Franklin DO;  Location: [facility] OR     SINUS SURGERY       TUBAL  "STERILIZATION         Social History     Tobacco Use     Smoking status: Never Smoker     Smokeless tobacco: Never Used     Tobacco comment: Tried to Quit (YES); QUIT in 1971; Passive Exposure (NO)   Substance Use Topics     Alcohol use: Yes     Comment: RARELY     Family History   Problem Relation Age of Onset     Breast Cancer Mother 66        Cause of Death     Parkinsonism Father         \"Possible\"         Current Outpatient Medications   Medication Sig Dispense Refill     aspirin 81 MG tablet Take 81 mg by mouth daily       atorvastatin (LIPITOR) 10 MG tablet Take 1 tablet (10 mg) by mouth daily 90 tablet 3     citalopram (CELEXA) 20 MG tablet Take 1 tablet (20 mg) by mouth daily (Patient taking differently: Take 20 mg by mouth daily Currently taking 5 mg dailyl) 90 tablet 3     EPINEPHrine (EPIPEN) 0.3 MG/0.3ML injection Inject 0.3 mg into the muscle once as needed. Use as needed for anaphylaxis.       losartan (COZAAR) 50 MG tablet Take 1 tablet (50 mg) by mouth daily 90 tablet 3     meclizine (ANTIVERT) 25 MG tablet Take 1 tablet (25 mg) by mouth 3 times daily as needed for dizziness (Patient taking differently: Take 25 mg by mouth 3 times daily as needed for dizziness Currently taking 12.5 mg prn) 30 tablet 0     fluticasone (FLONASE) 50 MCG/ACT nasal spray SPRAY 2 SPRAYS INTO BOTH NOSTRILS DAILY (Patient not taking: Reported on 5/3/2019) 48 mL 0     Allergies   Allergen Reactions     Lisinopril Cough     Phenylephrine Hcl Other (See Comments)     **Entex  HEADACHE (SEVERE)     Phenylpropanolamine Other (See Comments)     **Entex  HEADACHE (SEVERE)     Pseudoephedrine Tannate Other (See Comments)     **Entex  HEADACHE (SEVERE)     Levofloxacin Rash     **Levaquin     Moxifloxacin Hcl [Avelox] Rash     Recent Labs   Lab Test 01/03/19  1043 12/31/18  1111 11/17/17  0844 10/26/16  0921   LDL 86  --  108* 109*   HDL 42*  --  60 54   TRIG 126  --  78 115   ALT  --  21 27 21   CR  --  0.70 0.69 0.78 "   GFRESTIMATED  --  88 84 74   GFRESTBLACK  --  >90 >90 90   POTASSIUM  --  3.6 4.1 3.8   TSH  --   --  1.77 2.08      BP Readings from Last 3 Encounters:   05/03/19 112/60   01/03/19 120/78   12/31/18 123/75    Wt Readings from Last 3 Encounters:   05/03/19 72.8 kg (160 lb 6.4 oz)   01/03/19 72.1 kg (159 lb)   12/31/18 70.8 kg (156 lb)                    ROS:  Constitutional, neuro, ENT, endocrine, pulmonary, cardiac, gastrointestinal, genitourinary, musculoskeletal, integument and psychiatric systems are negative, except as otherwise noted.    OBJECTIVE:                                                    /60 (BP Location: Left arm, Patient Position: Chair, Cuff Size: Adult Regular)   Pulse 73   Temp 97.9  F (36.6  C) (Tympanic)   Wt 72.8 kg (160 lb 6.4 oz)   SpO2 96%   BMI 28.41 kg/m    Body mass index is 28.41 kg/m .  GENERAL APPEARANCE: healthy, alert and no distress  EYES: Eyes grossly normal to inspection, PERRL and conjunctivae and sclerae normal  HENT: ear canals and TM's normal and nose and mouth without ulcers or lesions  NECK: no adenopathy, no asymmetry, masses, or scars and thyroid normal to palpation  RESP: lungs clear to auscultation - no rales, rhonchi or wheezes  CV: regular rates and rhythm, normal S1 S2, no S3 or S4 and no murmur, click or rub  LYMPHATICS: no cervical adenopathy  MS: extremities normal- no gross deformities noted  SKIN: no suspicious lesions or rashes. No bruising.   NEURO: Normal strength and tone, mentation intact and speech normal.rhomberg negative. And cranial nerves are grossly intact.   PSYCH: mentation appears normal and affect normal/bright    Diagnostic test results:  Diagnostic Test Results:  Results for orders placed or performed in visit on 05/03/19   Comprehensive metabolic panel   Result Value Ref Range    Sodium 131 (L) 133 - 144 mmol/L    Potassium 4.0 3.4 - 5.3 mmol/L    Chloride 101 94 - 109 mmol/L    Carbon Dioxide 28 20 - 32 mmol/L    Anion Gap 2 (L)  3 - 14 mmol/L    Glucose 99 70 - 99 mg/dL    Urea Nitrogen 17 7 - 30 mg/dL    Creatinine 0.70 0.52 - 1.04 mg/dL    GFR Estimate 88 >60 mL/min/[1.73_m2]    GFR Estimate If Black >90 >60 mL/min/[1.73_m2]    Calcium 8.5 8.5 - 10.1 mg/dL    Bilirubin Total 0.3 0.2 - 1.3 mg/dL    Albumin 3.1 (L) 3.4 - 5.0 g/dL    Protein Total 12.9 (H) 6.8 - 8.8 g/dL    Alkaline Phosphatase 110 40 - 150 U/L    ALT 37 0 - 50 U/L    AST 28 0 - 45 U/L   CBC with platelets and differential   Result Value Ref Range    WBC 5.5 4.0 - 11.0 10e9/L    RBC Count 3.30 (L) 3.8 - 5.2 10e12/L    Hemoglobin 10.0 (L) 11.7 - 15.7 g/dL    Hematocrit 29.8 (L) 35.0 - 47.0 %    MCV 90 78 - 100 fl    MCH 30.3 26.5 - 33.0 pg    MCHC 33.6 31.5 - 36.5 g/dL    RDW 16.1 (H) 10.0 - 15.0 %    Platelet Count 232 150 - 450 10e9/L    Diff Method Automated Method     % Neutrophils 43.7 %    % Lymphocytes 38.7 %    % Monocytes 11.6 %    % Eosinophils 2.8 %    % Basophils 0.6 %    % Immature Granulocytes 2.6 %    Nucleated RBCs 0 0 /100    Absolute Neutrophil 2.4 1.6 - 8.3 10e9/L    Absolute Lymphocytes 2.1 0.8 - 5.3 10e9/L    Absolute Monocytes 0.6 0.0 - 1.3 10e9/L    Absolute Eosinophils 0.2 0.0 - 0.7 10e9/L    Absolute Basophils 0.0 0.0 - 0.2 10e9/L    Abs Immature Granulocytes 0.1 0 - 0.4 10e9/L    Absolute Nucleated RBC 0.0    ESR: Erythrocyte sedimentation rate   Result Value Ref Range    Sed Rate 103 (H) 0 - 30 mm/h        ASSESSMENT/PLAN:                                                    1. Immunization due  Given today.   - PNEUMOCOCCAL VACCINE,ADULT,SQ OR IM  - VACCINE ADMINISTRATION, INITIAL    2. Low sodium levels  We have been watching this and attributing this to her ssri. Avoiding over hydration. Is very stressed and long discussion on this. Will not be increasing ssri.    - Comprehensive metabolic panel    3. Dizziness  New over the last month. Neuro intact.  Does have fatigue and sleeping more than usual.  Not sure if related to stress. When I go t he  labs back I assured her it probably is something else.   - Comprehensive metabolic panel    4. Elevated blood protein  Checking for Myeloma will be getting SPEP and UPEP. No longer do Teddy Do. Will have her see Oncology. They do See Dr Scott for her husbands pancreatic cancer.   - CBC with platelets and differential  - ESR: Erythrocyte sedimentation rate  - Protein electrophoresis      See Patient Instructions    VENKATESH Tervino  [facility] [place] - [facility]

## 2019-05-03 ENCOUNTER — OFFICE VISIT (OUTPATIENT)
Dept: FAMILY MEDICINE | Facility: OTHER | Age: 69
End: 2019-05-03
Attending: PHYSICIAN ASSISTANT
Payer: MEDICARE

## 2019-05-03 VITALS
WEIGHT: 160.4 LBS | DIASTOLIC BLOOD PRESSURE: 60 MMHG | SYSTOLIC BLOOD PRESSURE: 112 MMHG | BODY MASS INDEX: 28.41 KG/M2 | TEMPERATURE: 97.9 F | HEART RATE: 73 BPM | OXYGEN SATURATION: 96 %

## 2019-05-03 DIAGNOSIS — R77.9 ELEVATED BLOOD PROTEIN: ICD-10-CM

## 2019-05-03 DIAGNOSIS — R42 DIZZINESS: ICD-10-CM

## 2019-05-03 DIAGNOSIS — Z23 IMMUNIZATION DUE: Primary | ICD-10-CM

## 2019-05-03 DIAGNOSIS — R77.9 ELEVATED BLOOD PROTEIN: Primary | ICD-10-CM

## 2019-05-03 DIAGNOSIS — E87.1 LOW SODIUM LEVELS: ICD-10-CM

## 2019-05-03 LAB
ALBUMIN SERPL-MCNC: 3.1 G/DL (ref 3.4–5)
ALP SERPL-CCNC: 110 U/L (ref 40–150)
ALT SERPL W P-5'-P-CCNC: 37 U/L (ref 0–50)
ANION GAP SERPL CALCULATED.3IONS-SCNC: 2 MMOL/L (ref 3–14)
AST SERPL W P-5'-P-CCNC: 28 U/L (ref 0–45)
BASOPHILS # BLD AUTO: 0 10E9/L (ref 0–0.2)
BASOPHILS NFR BLD AUTO: 0.6 %
BILIRUB SERPL-MCNC: 0.3 MG/DL (ref 0.2–1.3)
BUN SERPL-MCNC: 17 MG/DL (ref 7–30)
CALCIUM SERPL-MCNC: 8.5 MG/DL (ref 8.5–10.1)
CHLORIDE SERPL-SCNC: 101 MMOL/L (ref 94–109)
CO2 SERPL-SCNC: 28 MMOL/L (ref 20–32)
CREAT SERPL-MCNC: 0.7 MG/DL (ref 0.52–1.04)
DIFFERENTIAL METHOD BLD: ABNORMAL
EOSINOPHIL # BLD AUTO: 0.2 10E9/L (ref 0–0.7)
EOSINOPHIL NFR BLD AUTO: 2.8 %
ERYTHROCYTE [DISTWIDTH] IN BLOOD BY AUTOMATED COUNT: 16.1 % (ref 10–15)
ERYTHROCYTE [SEDIMENTATION RATE] IN BLOOD BY WESTERGREN METHOD: 103 MM/H (ref 0–30)
GFR SERPL CREATININE-BSD FRML MDRD: 88 ML/MIN/{1.73_M2}
GLUCOSE SERPL-MCNC: 99 MG/DL (ref 70–99)
HCT VFR BLD AUTO: 29.8 % (ref 35–47)
HGB BLD-MCNC: 10 G/DL (ref 11.7–15.7)
IMM GRANULOCYTES # BLD: 0.1 10E9/L (ref 0–0.4)
IMM GRANULOCYTES NFR BLD: 2.6 %
LYMPHOCYTES # BLD AUTO: 2.1 10E9/L (ref 0.8–5.3)
LYMPHOCYTES NFR BLD AUTO: 38.7 %
MCH RBC QN AUTO: 30.3 PG (ref 26.5–33)
MCHC RBC AUTO-ENTMCNC: 33.6 G/DL (ref 31.5–36.5)
MCV RBC AUTO: 90 FL (ref 78–100)
MONOCYTES # BLD AUTO: 0.6 10E9/L (ref 0–1.3)
MONOCYTES NFR BLD AUTO: 11.6 %
NEUTROPHILS # BLD AUTO: 2.4 10E9/L (ref 1.6–8.3)
NEUTROPHILS NFR BLD AUTO: 43.7 %
NRBC # BLD AUTO: 0 10*3/UL
NRBC BLD AUTO-RTO: 0 /100
PLATELET # BLD AUTO: 232 10E9/L (ref 150–450)
POTASSIUM SERPL-SCNC: 4 MMOL/L (ref 3.4–5.3)
PROT SERPL-MCNC: 12.9 G/DL (ref 6.8–8.8)
RBC # BLD AUTO: 3.3 10E12/L (ref 3.8–5.2)
SODIUM SERPL-SCNC: 131 MMOL/L (ref 133–144)
WBC # BLD AUTO: 5.5 10E9/L (ref 4–11)

## 2019-05-03 PROCEDURE — G0463 HOSPITAL OUTPT CLINIC VISIT: HCPCS | Mod: 25

## 2019-05-03 PROCEDURE — 80053 COMPREHEN METABOLIC PANEL: CPT | Mod: ZL | Performed by: PHYSICIAN ASSISTANT

## 2019-05-03 PROCEDURE — G0009 ADMIN PNEUMOCOCCAL VACCINE: HCPCS | Performed by: PHYSICIAN ASSISTANT

## 2019-05-03 PROCEDURE — G0463 HOSPITAL OUTPT CLINIC VISIT: HCPCS

## 2019-05-03 PROCEDURE — 99000 SPECIMEN HANDLING OFFICE-LAB: CPT | Performed by: PHYSICIAN ASSISTANT

## 2019-05-03 PROCEDURE — 36415 COLL VENOUS BLD VENIPUNCTURE: CPT | Mod: ZL | Performed by: PHYSICIAN ASSISTANT

## 2019-05-03 PROCEDURE — 90732 PPSV23 VACC 2 YRS+ SUBQ/IM: CPT

## 2019-05-03 PROCEDURE — 00000402 ZZHCL STATISTIC TOTAL PROTEIN: Mod: ZL | Performed by: PHYSICIAN ASSISTANT

## 2019-05-03 PROCEDURE — 99214 OFFICE O/P EST MOD 30 MIN: CPT | Performed by: PHYSICIAN ASSISTANT

## 2019-05-03 PROCEDURE — 85025 COMPLETE CBC W/AUTO DIFF WBC: CPT | Mod: ZL | Performed by: PHYSICIAN ASSISTANT

## 2019-05-03 PROCEDURE — 84165 PROTEIN E-PHORESIS SERUM: CPT | Mod: ZL | Performed by: PHYSICIAN ASSISTANT

## 2019-05-03 PROCEDURE — 85652 RBC SED RATE AUTOMATED: CPT | Mod: ZL | Performed by: PHYSICIAN ASSISTANT

## 2019-05-03 ASSESSMENT — PAIN SCALES - GENERAL: PAINLEVEL: NO PAIN (0)

## 2019-05-03 NOTE — PATIENT INSTRUCTIONS
Thank you for choosing [facility].   I have office hours 8:00 am to 4:30 pm on Monday's, Wednesday's, Thursday's and Friday's. My nurse and I are out of the office every Tuesday.    Following your visit, when your labs and diagnostic testing have returned, I will review then and you will be contacted by my nurse.  If you are on My Chart, you can also view results there.    For refills, notify your pharmacy regarding what you need and the pharmacy will generate a refill request. Do not call my nurse as she is unable to process refill request. Please plan ahead and allow 3-5 days for refill requests.    You will generally receive a reminder call the day prior to your appointment.  If you cannot attend your appointment, please cancel your appointment with as much notice as possible.  If there is a pattern of failure to present for your appointments, I cannot provide consistent, meaningful, ongoing care for you. It is very important to me that you come in for your care, so we can best assist you with your health care needs.    IMPORTANT:  Please note that it is my standard of practice to NOT participate in prescribing ongoing requested Narcotic Analgesic therapy, and/or participate in the prescribing of other controlled substances.  My nurse and I am happy to assist you with the process of referral for alternative pain management as needed, and other treatment modalities including but not limited to:  Physical Therapy, Physical Medicine and Rehab, Counseling, Chiropractic Care, Orthopedic Care, and non-narcotic medication management.     In the event that you need to be seen for emergent concerns and I am out of office,  please see one of my colleagues for acute concerns.  You may also present to  or ER.  I appreciate the opportunity to serve you and look forward to supporting your healthcare needs in the future. Please contact me with any questions or concerns that you may  have.    Sincerely,      Kenyatta Trujillo RN, PA-C

## 2019-05-03 NOTE — NURSING NOTE
"Chief Complaint   Patient presents with     Dizziness       Initial /60 (BP Location: Left arm, Patient Position: Chair, Cuff Size: Adult Regular)   Pulse 73   Temp 97.9  F (36.6  C) (Tympanic)   Wt 72.8 kg (160 lb 6.4 oz)   SpO2 96%   BMI 28.41 kg/m   Estimated body mass index is 28.41 kg/m  as calculated from the following:    Height as of 1/3/19: 1.6 m (5' 3\").    Weight as of this encounter: 72.8 kg (160 lb 6.4 oz).  Medication Reconciliation: complete    Sol Vallecillo LPN  "

## 2019-05-03 NOTE — LETTER
My Depression Action Plan  Name: Carmelita Watts   Date of Birth 1950  Date: 5/3/2019    My doctor: Kenyatta Trujillo   My clinic: [facility] [place]  [address]  [place] [address]  [phone]          GREEN    ZONE   Good Control    What it looks like:     Things are going generally well. You have normal up s and down s. You may even feel depressed from time to time, but bad moods usually last less than a day.   What you need to do:  1. Continue to care for yourself (see self care plan)  2. Check your depression survival kit and update it as needed  3. Follow your physician s recommendations including any medication.  4. Do not stop taking medication unless you consult with your physician first.           YELLOW         ZONE Getting Worse    What it looks like:     Depression is starting to interfere with your life.     It may be hard to get out of bed; you may be starting to isolate yourself from others.    Symptoms of depression are starting to last most all day and this has happened for several days.     You may have suicidal thoughts but they are not constant.   What you need to do:     1. Call your care team, your response to treatment will improve if you keep your care team informed of your progress. Yellow periods are signs an adjustment may need to be made.     2. Continue your self-care, even if you have to fake it!    3. Talk to someone in your support network    4. Open up your depression survival kit           RED    ZONE Medical Alert - Get Help    What it looks like:     Depression is seriously interfering with your life.     You may experience these or other symptoms: You can t get out of bed most days, can t work or engage in other necessary activities, you have trouble taking care of basic hygiene, or basic responsibilities, thoughts of suicide or death that will not go away, self-injurious behavior.     What you need to do:  1. Call your care team and  request a same-day appointment. If they are not available (weekends or after hours) call your local crisis line, emergency room or 911.            Depression Self Care Plan / Survival Kit    Self-Care for Depression  Here s the deal. Your body and mind are really not as separate as most people think.  What you do and think affects how you feel and how you feel influences what you do and think. This means if you do things that people who feel good do, it will help you feel better.  Sometimes this is all it takes.  There is also a place for medication and therapy depending on how severe your depression is, so be sure to consult with your medical provider and/ or Behavioral Health Consultant if your symptoms are worsening or not improving.     In order to better manage my stress, I will:    Exercise  Get some form of exercise, every day. This will help reduce pain and release endorphins, the  feel good  chemicals in your brain. This is almost as good as taking antidepressants!  This is not the same as joining a gym and then never going! (they count on that by the way ) It can be as simple as just going for a walk or doing some gardening, anything that will get you moving.      Hygiene   Maintain good hygiene (Get out of bed in the morning, Make your bed, Brush your teeth, Take a shower, and Get dressed like you were going to work, even if you are unemployed).  If your clothes don't fit try to get ones that do.    Diet  I will strive to eat foods that are good for me, drink plenty of water, and avoid excessive sugar, caffeine, alcohol, and other mood-altering substances.  Some foods that are helpful in depression are: complex carbohydrates, B vitamins, flaxseed, fish or fish oil, fresh fruits and vegetables.    Psychotherapy  I agree to participate in Individual Therapy (if recommended).    Medication  If prescribed medications, I agree to take them.  Missing doses can result in serious side effects.  I understand that  drinking alcohol, or other illicit drug use, may cause potential side effects.  I will not stop my medication abruptly without first discussing it with my provider.    Staying Connected With Others  I will stay in touch with my friends, family members, and my primary care provider/team.    Use your imagination  Be creative.  We all have a creative side; it doesn t matter if it s oil painting, sand castles, or mud pies! This will also kick up the endorphins.    Witness Beauty  (AKA stop and smell the roses) Take a look outside, even in mid-winter. Notice colors, textures. Watch the squirrels and birds.     Service to others  Be of service to others.  There is always someone else in need.  By helping others we can  get out of ourselves  and remember the really important things.  This also provides opportunities for practicing all the other parts of the program.    Humor  Laugh and be silly!  Adjust your TV habits for less news and crime-drama and more comedy.    Control your stress  Try breathing deep, massage therapy, biofeedback, and meditation. Find time to relax each day.     My support system    Clinic Contact:  Phone number:    Contact 1:  Phone number:    Contact 2:  Phone number:    [facility]/:  Phone number:    Therapist:  Phone number:    Local crisis center:    Phone number:    Other community support:  Phone number:

## 2019-05-06 DIAGNOSIS — R77.9 ELEVATED BLOOD PROTEIN: ICD-10-CM

## 2019-05-06 PROCEDURE — 99000 SPECIMEN HANDLING OFFICE-LAB: CPT | Performed by: PHYSICIAN ASSISTANT

## 2019-05-06 PROCEDURE — 84166 PROTEIN E-PHORESIS/URINE/CSF: CPT | Mod: ZL | Performed by: PHYSICIAN ASSISTANT

## 2019-05-07 LAB
ALBUMIN SERPL ELPH-MCNC: 4 G/DL (ref 3.7–5.1)
ALPHA1 GLOB SERPL ELPH-MCNC: 0.3 G/DL (ref 0.2–0.4)
ALPHA2 GLOB SERPL ELPH-MCNC: 0.7 G/DL (ref 0.5–0.9)
B-GLOBULIN SERPL ELPH-MCNC: 0.6 G/DL (ref 0.6–1)
GAMMA GLOB SERPL ELPH-MCNC: 6.4 G/DL (ref 0.7–1.6)
M PROTEIN SERPL ELPH-MCNC: 6.2 G/DL
PROT PATTERN SERPL ELPH-IMP: ABNORMAL

## 2019-05-20 LAB — PROT PATTERN UR ELPH-IMP: NORMAL

## 2019-05-31 ENCOUNTER — ONCOLOGY VISIT (OUTPATIENT)
Dept: ONCOLOGY | Facility: OTHER | Age: 69
End: 2019-05-31
Attending: PHYSICIAN ASSISTANT
Payer: MEDICARE

## 2019-05-31 VITALS
BODY MASS INDEX: 27.85 KG/M2 | TEMPERATURE: 96.9 F | SYSTOLIC BLOOD PRESSURE: 110 MMHG | HEIGHT: 63 IN | RESPIRATION RATE: 18 BRPM | OXYGEN SATURATION: 96 % | DIASTOLIC BLOOD PRESSURE: 70 MMHG | HEART RATE: 90 BPM | WEIGHT: 157.2 LBS

## 2019-05-31 DIAGNOSIS — D47.2 MGUS (MONOCLONAL GAMMOPATHY OF UNKNOWN SIGNIFICANCE): Primary | ICD-10-CM

## 2019-05-31 DIAGNOSIS — R77.9 ELEVATED BLOOD PROTEIN: ICD-10-CM

## 2019-05-31 LAB
ALBUMIN SERPL-MCNC: 3 G/DL (ref 3.4–5)
ALP SERPL-CCNC: 105 U/L (ref 40–150)
ALT SERPL W P-5'-P-CCNC: 53 U/L (ref 0–50)
ANION GAP SERPL CALCULATED.3IONS-SCNC: 4 MMOL/L (ref 3–14)
AST SERPL W P-5'-P-CCNC: 43 U/L (ref 0–45)
BASOPHILS # BLD AUTO: 0 10E9/L (ref 0–0.2)
BASOPHILS NFR BLD AUTO: 0.4 %
BILIRUB SERPL-MCNC: 0.3 MG/DL (ref 0.2–1.3)
BUN SERPL-MCNC: 21 MG/DL (ref 7–30)
CALCIUM SERPL-MCNC: 8.4 MG/DL (ref 8.5–10.1)
CHLORIDE SERPL-SCNC: 99 MMOL/L (ref 94–109)
CO2 SERPL-SCNC: 27 MMOL/L (ref 20–32)
CREAT SERPL-MCNC: 0.8 MG/DL (ref 0.52–1.04)
DIFFERENTIAL METHOD BLD: ABNORMAL
EOSINOPHIL # BLD AUTO: 0.1 10E9/L (ref 0–0.7)
EOSINOPHIL NFR BLD AUTO: 1.9 %
ERYTHROCYTE [DISTWIDTH] IN BLOOD BY AUTOMATED COUNT: 16.7 % (ref 10–15)
GFR SERPL CREATININE-BSD FRML MDRD: 75 ML/MIN/{1.73_M2}
GLUCOSE SERPL-MCNC: 100 MG/DL (ref 70–99)
HCT VFR BLD AUTO: 29.3 % (ref 35–47)
HGB BLD-MCNC: 9.9 G/DL (ref 11.7–15.7)
IMM GRANULOCYTES # BLD: 0.1 10E9/L (ref 0–0.4)
IMM GRANULOCYTES NFR BLD: 2.3 %
LYMPHOCYTES # BLD AUTO: 1.8 10E9/L (ref 0.8–5.3)
LYMPHOCYTES NFR BLD AUTO: 34.9 %
MCH RBC QN AUTO: 30.7 PG (ref 26.5–33)
MCHC RBC AUTO-ENTMCNC: 33.8 G/DL (ref 31.5–36.5)
MCV RBC AUTO: 91 FL (ref 78–100)
MISCELLANEOUS TEST: NORMAL
MONOCYTES # BLD AUTO: 0.7 10E9/L (ref 0–1.3)
MONOCYTES NFR BLD AUTO: 14.2 %
NEUTROPHILS # BLD AUTO: 2.4 10E9/L (ref 1.6–8.3)
NEUTROPHILS NFR BLD AUTO: 46.3 %
NRBC # BLD AUTO: 0 10*3/UL
NRBC BLD AUTO-RTO: 0 /100
PLATELET # BLD AUTO: 253 10E9/L (ref 150–450)
POTASSIUM SERPL-SCNC: 3.6 MMOL/L (ref 3.4–5.3)
PROT SERPL-MCNC: 13.7 G/DL (ref 6.8–8.8)
RBC # BLD AUTO: 3.22 10E12/L (ref 3.8–5.2)
SODIUM SERPL-SCNC: 130 MMOL/L (ref 133–144)
WBC # BLD AUTO: 5.2 10E9/L (ref 4–11)

## 2019-05-31 PROCEDURE — 86334 IMMUNOFIX E-PHORESIS SERUM: CPT | Mod: ZL | Performed by: INTERNAL MEDICINE

## 2019-05-31 PROCEDURE — 36415 COLL VENOUS BLD VENIPUNCTURE: CPT | Mod: ZL | Performed by: INTERNAL MEDICINE

## 2019-05-31 PROCEDURE — 83883 ASSAY NEPHELOMETRY NOT SPEC: CPT | Mod: ZL | Performed by: INTERNAL MEDICINE

## 2019-05-31 PROCEDURE — 82784 ASSAY IGA/IGD/IGG/IGM EACH: CPT | Mod: ZL | Performed by: INTERNAL MEDICINE

## 2019-05-31 PROCEDURE — 86335 IMMUNFIX E-PHORSIS/URINE/CSF: CPT | Mod: ZL | Performed by: INTERNAL MEDICINE

## 2019-05-31 PROCEDURE — 84165 PROTEIN E-PHORESIS SERUM: CPT | Mod: ZL | Performed by: INTERNAL MEDICINE

## 2019-05-31 PROCEDURE — 84999 UNLISTED CHEMISTRY PROCEDURE: CPT | Mod: ZL | Performed by: INTERNAL MEDICINE

## 2019-05-31 PROCEDURE — 85810 BLOOD VISCOSITY EXAMINATION: CPT | Mod: ZL | Performed by: INTERNAL MEDICINE

## 2019-05-31 PROCEDURE — 80053 COMPREHEN METABOLIC PANEL: CPT | Mod: ZL | Performed by: INTERNAL MEDICINE

## 2019-05-31 PROCEDURE — G0463 HOSPITAL OUTPT CLINIC VISIT: HCPCS

## 2019-05-31 PROCEDURE — 99204 OFFICE O/P NEW MOD 45 MIN: CPT | Performed by: INTERNAL MEDICINE

## 2019-05-31 PROCEDURE — 99000 SPECIMEN HANDLING OFFICE-LAB: CPT | Performed by: INTERNAL MEDICINE

## 2019-05-31 PROCEDURE — 85025 COMPLETE CBC W/AUTO DIFF WBC: CPT | Mod: ZL | Performed by: INTERNAL MEDICINE

## 2019-05-31 PROCEDURE — 00000402 ZZHCL STATISTIC TOTAL PROTEIN: Mod: ZL | Performed by: INTERNAL MEDICINE

## 2019-05-31 PROCEDURE — 82232 ASSAY OF BETA-2 PROTEIN: CPT | Mod: ZL | Performed by: INTERNAL MEDICINE

## 2019-05-31 ASSESSMENT — MIFFLIN-ST. JEOR: SCORE: 1202.18

## 2019-05-31 ASSESSMENT — PAIN SCALES - GENERAL: PAINLEVEL: NO PAIN (0)

## 2019-05-31 ASSESSMENT — PATIENT HEALTH QUESTIONNAIRE - PHQ9: SUM OF ALL RESPONSES TO PHQ QUESTIONS 1-9: 3

## 2019-05-31 NOTE — NURSING NOTE
"Chief Complaint   Patient presents with     Consult     Consult elavated blood protein referred by JULIANA Trujillo        Initial /70   Pulse 90   Temp 96.9  F (36.1  C) (Tympanic)   Resp 18   Ht 1.6 m (5' 3\")   Wt 71.3 kg (157 lb 3.2 oz)   SpO2 96%   BMI 27.85 kg/m   Estimated body mass index is 27.85 kg/m  as calculated from the following:    Height as of this encounter: 1.6 m (5' 3\").    Weight as of this encounter: 71.3 kg (157 lb 3.2 oz).  Medication Reconciliation: complete.  Immunizations and advance directives status reviewed. Pain scale 0 , PHQ-9 =3.    Patient was assessed using the NCCN psychosocial distress thermometer. Patient rated the score as a 1. Patient rated current stressors as depression, . Stressors will be brought to the attention of provider or Oncology RN Care Coordinator for a score of 6 or greater or per nurses discretion.                       Vilma Chi LPN    "

## 2019-05-31 NOTE — PROGRESS NOTES
Visit Date:   05/31/2019      HEMATOLOGY/ONCOLOGY CLINIC REPORT      REASON FOR CONSULTATION:  Monoclonal gammopathy.      REQUESTING PHYSICIAN:  VENKATESH Vázquez.      HISTORY OF PRESENT ILLNESS:  Ms. Watts is a 68-year-old white female with a history of hypertension.  We were asked to evaluate concerning a new diagnosis of monoclonal gammopathy.  Apparently, she had presented to the Emergency Room with vertigo and dizziness associated with fatigue.  CT head was negative.  She also had a stress test that was negative.  She was seen by Kenyatta Trujillo who ordered chemistries on her, and she was found to have an elevated total protein of 12.9.  Further workup included a serum protein electrophoresis, which revealed a monoclonal spike of 6.2 with a large monoclonal protein seen in the gamma fraction.  She had a urine for immunofixation, which revealed a possible small protein band in the gamma fraction.  The patient otherwise is here for further evaluation.  She says symptom-wise, she denies any headaches or blurred vision.  She says the vertigo is under control with meclizine.  She does have chronic fatigue.  She is under a lot of stress related to her 's diagnosis of cancer.  She gets occasionally dyspneic on exertion.  Denies any fevers, night sweats, or weight loss.  No significant bone pain.  No neuropathic symptoms.      PAST MEDICAL HISTORY:  Significant for depressive disorder, hypertension, hyperlipidemia vertigo, and seasonal allergies.      ALLERGIES:  SHE IS ALLERGIC TO LISINOPRIL, PHENYLEPHRINE, PHENYLPROPANOLAMINE, PSEUDOEPHEDRINE, LEVOFLOXACIN, MOXIFLOXACIN.      CURRENT MEDICATIONS:   1.  Lipitor 10 mg daily.   2.  Celexa 20 mg daily.   3.  Cozaar 50 mg daily.   4.  Flonase nasal spray 2 sprays into both nostrils daily.   5.  Aspirin 81 mg daily.      SOCIAL HISTORY:  Tobacco is negative.  Alcohol is negative.  She worked as an  for the [employer] for 30 years.      FAMILY HISTORY:   Significant for mother with breast cancer.      REVIEW OF SYSTEMS:   CENTRAL NERVOUS SYSTEM:  Negative for headaches, change in mental status.  She does get vertigo-type symptoms.   ENT:  Negative for hearing loss, odynophagia, nasal congestion.   RESPIRATORY:  Significant for occasional dyspnea on exertion.  No cough, hemoptysis.   CARDIAC:  Negative for chest pain, palpitations, orthopnea, PND.   GASTROINTESTINAL:  Negative for change in bowel habits, bright red blood per rectum, hematemesis, melena.   MUSCULOSKELETAL:  Unremarkable.   GENITOURINARY:  Negative for hematuria, urgency, frequency.   CONSTITUTIONAL:  Negative for fevers, night sweats, weight loss.   HEMATOLOGIC:  Negative for easy bruisability, gingival bleeding, epistaxis.      PHYSICAL EXAMINATION:   GENERAL:  She is a well-developed, well-nourished elderly white female in no acute distress.   VITAL SIGNS:  Blood pressure 110/70, pulse 90, respirations 18, temperature 96.9.   HEENT:  Atraumatic, normocephalic.  Oropharynx is nonerythematous.   NECK:  Supple, no thyromegaly.   LUNGS:  Clear to auscultation and percussion.   HEART:  Regular rhythm.  S1 is normal.   ABDOMEN:  Soft, normoactive bowel sounds, no masses or tenderness.   LYMPHATICS:  No cervical, supraclavicular, axillary or inguinal nodes.   EXTREMITIES:  No edema.   NEUROLOGIC:  Nonfocal.      LABORATORY DATA:  Pending.  CBC from 05/03 reveals a white count of 5.5, H and H 10.0 and 29.8, platelet count 232.  Sodium 131, BUN 17, creatinine 0.7, calcium 8.5, total protein 12.9.      IMPRESSION:  Monoclonal gammopathy with M-spike of 6.2.  We would like to rule out myeloma by obtaining a bone marrow aspiration biopsy.  Also, obtain serum kappa lambda light chains, serum for immunofixation, beta-2 microglobulin viscosity, serum viscosity, urine for immunofixation.  Also will obtain a skeletal metastatic bone survey to rule out lytic lesions.  Otherwise, we will see the patient for the above  workup.  If the patient has multiple myeloma, she will need active treatment.  We will await the results and proceed with therapy if necessary.      Seventy minutes was spent with the patient with greater than half the time spent in counseling and coordination of care.         DORIE GUNN MD             D: 2019   T: 2019   MT:       Name:     WILLIAM WILDER   MRN:      [id number]        Account:      [id number]   :      1950           Visit Date:   2019      Document: [id number]

## 2019-05-31 NOTE — PATIENT INSTRUCTIONS
Patient Education     Bone Marrow Aspiration and Biopsy  Does this test have other names?  Bone marrow exam  What is this test?  This is a two-part test that looks at the blood cells in a sample of bone marrow, the spongy tissue within certain bones. This test may help your healthcare provider diagnose or monitor a blood disease or health condition affecting your marrow.  Your bone marrow has a liquid part and a solid part. Aspiration uses a needle to remove a sample of the liquid part of bone marrow. Biopsy uses a larger needle to remove a small amount of bone with its marrow.  Part of the job of bone marrow is to make blood cells. This test can find out how well your bone marrow is working. This test is also done to find some types of cancer.  Why do I need this test?  You might have this test if your healthcare provider wants to find out the health of your bone marrow or to check on how well your marrow is making blood cells.  You may have an aspiration to check for:    The health of your bone marrow for a transplant    Acute leukemia    Multiple myeloma  In some cases, bone marrow aspiration is used to confirm chromosome disorders in newborns.  You may have an aspiration followed by a biopsy if you could have:    Bacterial, fungal, or parasitic infection    Unexplained anemia, leucopenia, or thrombocytopenia    Metastatic cancer or many other diseases  What other tests might I have along with this test?  Your healthcare provider may also order these tests:    Complete blood count, or CBC    Reticulocyte count to find out your red blood cell survival rate  What do my test results mean?  Many things may affect your lab test results. These include the method each lab uses to do the test. Even if your test results are different from the normal value, you may not have a problem. To learn what the results mean for you, talk with your healthcare provider.  The lab will look at different aspects of your bone marrow to  help find certain diseases or conditions. These aspects include:    Type and number of blood cells    Any abnormalities in the size, shape, or look of cells    Level of iron in the bone marrow    Abnormal amount of young white blood cells, called blasts    Any chromosomal abnormalities  Depending on what is seen, your results may mean you have an infection, a blood disease, leukemia, or cancer that has spread to the bone marrow from another site.  Your healthcare provider will take your results and combine this information with information from your physical exam, health history, and other types of tests to make a diagnosis.   If your results are negative, your provider may order other tests to diagnose your condition.   How is this test done?  These tests require a sample of bone marrow. A number of sites on your body can be used for marrow aspiration, but the hip bone is a common spot. You will likely lie on your side or stomach on an exam table. Your healthcare provider will numb the area of the test. You may feel a slight prick from the needle that the provider uses to give the numbing agent.  Does this test pose any risks?  It's not possible to numb the bone, so you may feel slight pain during the procedure. But you shouldn't feel any pain afterward. Risks from a bone marrow test are rare, but you could have bleeding or an infection.  What might affect my test results?  Other factors aren't likely to affect your results.  How do I get ready for this test?  Tell your healthcare provider if you take aspirin or have any allergies. Also tell your provider if you are pregnant, take any blood-thinner medicines, or have a history of bleeding problems.  Be sure your provider knows about all other medicines, herbs, vitamins, and supplements you are taking. This includes medicines that don't need a prescription and any illicit drugs you may use.       [id number] [employer]. [address] [place] [address]  [place] [address]. All rights reserved. This information is not intended as a substitute for professional medical care. Always follow your healthcare professional's instructions.         Labs done today.  Order placed for Bone Marrow Biopsy; scheduled for 6/18/19.  You will need a preop physical with your provider, scheduled for 6/11/19 with Kenyatta Trujillo.  For the biopsy, you will need a .  Report to Hospital Admitting at 7:45 am.  Take it easy afterward; refrain from walking as the 1st 4 hours are critical for bleeding.  Take the day off of work.  You can resume normal activity the next day.

## 2019-06-03 DIAGNOSIS — D47.2 MONOCLONAL GAMMOPATHY: Primary | ICD-10-CM

## 2019-06-03 RX ORDER — LIDOCAINE HYDROCHLORIDE 10 MG/ML
8-10 INJECTION, SOLUTION INFILTRATION; PERINEURAL
Status: CANCELLED | OUTPATIENT
Start: 2019-06-03

## 2019-06-04 LAB
ALBUMIN SERPL ELPH-MCNC: 4.3 G/DL (ref 3.7–5.1)
ALPHA1 GLOB SERPL ELPH-MCNC: 0.3 G/DL (ref 0.2–0.4)
ALPHA2 GLOB SERPL ELPH-MCNC: 0.7 G/DL (ref 0.5–0.9)
B-GLOBULIN SERPL ELPH-MCNC: 0.6 G/DL (ref 0.6–1)
B2 MICROGLOB SERPL-MCNC: 5.8 MG/L
GAMMA GLOB SERPL ELPH-MCNC: 7.6 G/DL (ref 0.7–1.6)
IGA SERPL-MCNC: 14 MG/DL (ref 70–380)
IGG SERPL-MCNC: 8160 MG/DL (ref 695–1620)
IGM SERPL-MCNC: 8 MG/DL (ref 60–265)
M PROTEIN SERPL ELPH-MCNC: 7.3 G/DL
PROT ELPH PNL UR ELPH: NORMAL
PROT PATTERN SERPL ELPH-IMP: ABNORMAL
PROT PATTERN SERPL IFE-IMP: ABNORMAL
VISC SER: 2.9 CP (ref 1.4–1.8)

## 2019-06-05 LAB
RESULT: ABNORMAL
SEND OUTS MISC TEST CODE: ABNORMAL
SEND OUTS MISC TEST SPECIMEN: ABNORMAL
TEST NAME: ABNORMAL

## 2019-06-07 NOTE — PROGRESS NOTES
[facility] [place]  [address]  [place] [address]  [phone]  Dept: [phone]    PRE-OP EVALUATION:  Today's date: 2019    Carmelita Watts (: 1950) presents for pre-operative evaluation assessment as requested by Dr. Walker.  She requires evaluation and anesthesia risk assessment prior to undergoing surgery/procedure for treatment of bone marrow biopsy .    Proposed Surgery/ Procedure: bone marrow biopsy  Date of Surgery/ Procedure: 19  Time of Surgery/ Procedure: TBD  Hospital/Surgical Facility[facility]  Primary Physician: Kenyatta Turjillo  Type of Anesthesia Anticipated: to be determined    Patient has a Health Care Directive or Living Will:  YES- directive     1. NO - Do you have a history of heart attack, stroke, stent, bypass or surgery on an artery in the head, neck, heart or legs?  2. NO - Do you ever have any pain or discomfort in your chest?  3. NO - Do you have a history of  Heart Failure?  4. NO - Are you troubled by shortness of breath when: walking on the level, up a slight hill or at night?  5. NO - Do you currently have a cold, bronchitis or other respiratory infection?  6. NO - Do you have a cough, shortness of breath or wheezing?  7. NO - Do you sometimes get pains in the calves of your legs when you walk?  8. NO - Do you or anyone in your family have previous history of blood clots?  9. NO - Do you or does anyone in your family have a serious bleeding problem such as prolonged bleeding following surgeries or cuts?  10. NO - Have you ever had problems with anemia or been told to take iron pills?  11. NO - Have you had any abnormal blood loss such as black, tarry or bloody stools, or abnormal vaginal bleeding?  12. NO - Have you ever had a blood transfusion?  13. NO - Have you or any of your relatives ever had problems with anesthesia?  14. NO - Do you have sleep apnea, excessive snoring or daytime drowsiness?  15. NO - Do you have any prosthetic heart  valves?  16. NO - Do you have prosthetic joints?  17. NO - Is there any chance that you may be pregnant?      HPI:     HPI related to upcoming procedure: she has elevated proteins and needing a bone marrow biopsy.        See problem list for active medical problems.  Problems all longstanding and stable, except as noted/documented.  See ROS for pertinent symptoms related to these conditions.      MEDICAL HISTORY:     Patient Active Problem List    Diagnosis Date Noted     ACP (advance care planning) 10/26/2016     Priority: Medium     Advance Care Planning 10/26/2016: ACP Review of Chart / Resources Provided:  Reviewed chart for advance care plan.  Carmelita MICHAEL Watts has no plan or code status on file. Discussed available resources and provided with information. Confirmed code status reflects current choices pending further ACP discussions.  Confirmed/documented legally designated decision makers.  Added by Alison Landeros             Major depressive disorder, recurrent episode, mild (H) 10/01/2015     Priority: Medium     Seasonal allergies 10/01/2015     Priority: Medium     Mixed hyperlipidemia 10/01/2015     Priority: Medium     Hyperlipidemia LDL goal <130 07/05/2013     Priority: Medium     Hypertension goal BP (blood pressure) < 140/80 07/05/2013     Priority: Medium     Advanced care planning/counseling discussion 07/09/2012     Priority: Medium      Past Medical History:   Diagnosis Date     Arthritis      Depressive disorder      Essential hypertension 10/1/2015     Major depressive disorder, recurrent episode, mild (H) 10/1/2015     Mixed hyperlipidemia 10/1/2015     Other specified disorders of bladder 07/09/2012    Irritable Bladder     Seasonal allergies 10/1/2015     Unspecified essential hypertension 03/19/2007     Unspecified sinusitis (chronic) 09/05/2007     Past Surgical History:   Procedure Laterality Date     APPENDECTOMY       CHOLECYSTECTOMY       COLONOSCOPY  07-    repeat 10 years      COLONOSCOPY N/A 12/30/2016    Procedure: COLONOSCOPY;  Surgeon: Bhaskar Franklin DO;  Location: [facility] OR     SINUS SURGERY       TUBAL STERILIZATION       Current Outpatient Medications   Medication Sig Dispense Refill     aspirin 81 MG tablet Take 81 mg by mouth daily       atorvastatin (LIPITOR) 10 MG tablet Take 1 tablet (10 mg) by mouth daily 90 tablet 3     citalopram (CELEXA) 20 MG tablet Take 1 tablet (20 mg) by mouth daily (Patient taking differently: Take 10 mg by mouth daily ) 90 tablet 3     EPINEPHrine (EPIPEN) 0.3 MG/0.3ML injection Inject 0.3 mg into the muscle once as needed. Use as needed for anaphylaxis.       losartan (COZAAR) 50 MG tablet Take 1 tablet (50 mg) by mouth daily 90 tablet 3     fluticasone (FLONASE) 50 MCG/ACT nasal spray SPRAY 2 SPRAYS INTO BOTH NOSTRILS DAILY (Patient not taking: Reported on 5/3/2019) 48 mL 0     OTC products: None, except as noted above    Allergies   Allergen Reactions     Lisinopril Cough     Phenylephrine Hcl Other (See Comments)     **Entex  HEADACHE (SEVERE)     Phenylpropanolamine Other (See Comments)     **Entex  HEADACHE (SEVERE)     Pseudoephedrine Tannate Other (See Comments)     **Entex  HEADACHE (SEVERE)     Levofloxacin Rash     **Levaquin     Moxifloxacin Hcl [Avelox] Rash      Latex Allergy: NO    Social History     Tobacco Use     Smoking status: Never Smoker     Smokeless tobacco: Never Used     Tobacco comment: Tried to Quit (YES); QUIT in 1971; Passive Exposure (NO)   Substance Use Topics     Alcohol use: Yes     Comment: RARELY     History   Drug Use No       REVIEW OF SYSTEMS:   CONSTITUTIONAL: NEGATIVE for fever, chills, change in weight  INTEGUMENTARY/SKIN: NEGATIVE for worrisome rashes, moles or lesions  ENT/MOUTH: NEGATIVE for ear, mouth and throat problems  RESP: NEGATIVE for significant cough or SOB  CV: NEGATIVE for chest pain, palpitations or peripheral edema  MUSCULOSKELETAL: NEGATIVE for significant arthralgias or myalgia  ENDOCRINE:  NEGATIVE for temperature intolerance, skin/hair changes  PSYCHIATRIC: NEGATIVE for changes in mood or affect    EXAM:   /70 (Patient Position: Sitting)   Pulse 97   Wt 71.2 kg (157 lb)   SpO2 96%   BMI 27.81 kg/m      GENERAL APPEARANCE: healthy, alert and no distress     EYES: EOMI, PERRL     HENT: ear canals and TM's normal and nose and mouth without ulcers or lesions     NECK: no adenopathy, no asymmetry, masses, or scars and thyroid normal to palpation     RESP: lungs clear to auscultation - no rales, rhonchi or wheezes     CV: regular rates and rhythm, normal S1 S2, no S3 or S4 and no murmur, click or rub     ABDOMEN:  soft, nontender, no HSM or masses and bowel sounds normal     MS: extremities normal- no gross deformities noted, no evidence of inflammation in joints, FROM in all extremities.     SKIN: no suspicious lesions or rashes     NEURO: Normal strength and tone, sensory exam grossly normal, mentation intact and speech normal     PSYCH: mentation appears normal. and affect normal/bright     LYMPHATICS: No cervical adenopathy    DIAGNOSTICS:     EKG: appears normal, NSR, normal axis, normal intervals, no acute ST/T changes c/w ischemia, no LVH by voltage criteria, unchanged from previous tracings  Labs Resulted Today:   Results for orders placed or performed in visit on 05/31/19   Beta 2 microglobulin   Result Value Ref Range    Beta-2-Microglobulin 5.8 (H) <2.3 mg/L   CBC with platelets differential   Result Value Ref Range    WBC 5.2 4.0 - 11.0 10e9/L    RBC Count 3.22 (L) 3.8 - 5.2 10e12/L    Hemoglobin 9.9 (L) 11.7 - 15.7 g/dL    Hematocrit 29.3 (L) 35.0 - 47.0 %    MCV 91 78 - 100 fl    MCH 30.7 26.5 - 33.0 pg    MCHC 33.8 31.5 - 36.5 g/dL    RDW 16.7 (H) 10.0 - 15.0 %    Platelet Count 253 150 - 450 10e9/L    Diff Method Automated Method     % Neutrophils 46.3 %    % Lymphocytes 34.9 %    % Monocytes 14.2 %    % Eosinophils 1.9 %    % Basophils 0.4 %    % Immature Granulocytes 2.3 %     Nucleated RBCs 0 0 /100    Absolute Neutrophil 2.4 1.6 - 8.3 10e9/L    Absolute Lymphocytes 1.8 0.8 - 5.3 10e9/L    Absolute Monocytes 0.7 0.0 - 1.3 10e9/L    Absolute Eosinophils 0.1 0.0 - 0.7 10e9/L    Absolute Basophils 0.0 0.0 - 0.2 10e9/L    Abs Immature Granulocytes 0.1 0 - 0.4 10e9/L    Absolute Nucleated RBC 0.0    Comprehensive metabolic panel   Result Value Ref Range    Sodium 130 (L) 133 - 144 mmol/L    Potassium 3.6 3.4 - 5.3 mmol/L    Chloride 99 94 - 109 mmol/L    Carbon Dioxide 27 20 - 32 mmol/L    Anion Gap 4 3 - 14 mmol/L    Glucose 100 (H) 70 - 99 mg/dL    Urea Nitrogen 21 7 - 30 mg/dL    Creatinine 0.80 0.52 - 1.04 mg/dL    GFR Estimate 75 >60 mL/min/[1.73_m2]    GFR Estimate If Black 87 >60 mL/min/[1.73_m2]    Calcium 8.4 (L) 8.5 - 10.1 mg/dL    Bilirubin Total 0.3 0.2 - 1.3 mg/dL    Albumin 3.0 (L) 3.4 - 5.0 g/dL    Protein Total 13.7 (H) 6.8 - 8.8 g/dL    Alkaline Phosphatase 105 40 - 150 U/L    ALT 53 (H) 0 - 50 U/L    AST 43 0 - 45 U/L   ELP reflex to IELP   Result Value Ref Range    Albumin Fraction 4.3 3.7 - 5.1 g/dL    Alpha 1 Fraction 0.3 0.2 - 0.4 g/dL    Alpha 2 Fraction 0.7 0.5 - 0.9 g/dL    Beta Fraction 0.6 0.6 - 1.0 g/dL    Gamma Fraction 7.6 (H) 0.7 - 1.6 g/dL    Monoclonal Peak 7.3 (H) 0.0 g/dL    ELP Interpretation:       Large monoclonal protein (7.3 g/dL) seen in the gamma fraction. See immunofixation report   on same specimen. Pathologic significance requires clinical correlation.  KENDRA Akins M.D., Ph.D., Pathologist.      Protein immunofixation urine   Result Value Ref Range    Immunofix ELP Urine (Note)    Protein Immunofixation Serum   Result Value Ref Range    Immunofixation ELP (Note)     IGG 8,160 (H) 695 - 1,620 mg/dL    IGA 14 (L) 70 - 380 mg/dL    IGM 8 (L) 60 - 265 mg/dL   Macroglobulins   Result Value Ref Range    Viscosity Index 2.9 (H) 1.4 - 1.8   flcs: Laboratory Miscellaneous Order   Result Value Ref Range    Miscellaneous Test         Specimen Received,  Reordered and sent to Performing laboratory - Report to follow upon   completion.     [facility] Miscellaneous Test   Result Value Ref Range    Result SEE NOTE (A)     Test Name KAPPA/LAMBDA QUANT FREE LIGHT CHAINS     Send Outs Misc Test Code 55,167     Send Outs Misc Test Specimen Serum        Recent Labs   Lab Test 05/31/19  1218 05/03/19  1420 05/03/19  1138   HGB 9.9* 10.0*  --     232  --    *  --  131*   POTASSIUM 3.6  --  4.0   CR 0.80  --  0.70        IMPRESSION:   Reason for surgery/procedure: pre op for bone marrow biopsy.   Diagnosis/reason for consult: medical clearance.     The proposed surgical procedure is considered LOW risk.    REVISED CARDIAC RISK INDEX  The patient has the following serious cardiovascular risks for perioperative complications such as (MI, PE, VFib and 3  AV Block):  No serious cardiac risks  INTERPRETATION: 0 risks: Class I (very low risk - 0.4% complication rate)    The patient has the following additional risks for perioperative complications:  No identified additional risks      ICD-10-CM    1. Preop general physical exam Z01.818 EKG 12-lead complete w/read - (Clinic Performed)     Basic metabolic panel     CBC with platelets differential     She is feeling ok. Aware of risks and potential diagnosis. She will have this procedure done by Dr. Vallecillo   RECOMMENDATIONS:     --Patient is to take all scheduled medications on the day of surgery EXCEPT for modifications listed below.    APPROVAL GIVEN to proceed with proposed procedure, without further diagnostic evaluation       Signed Electronically by: VENKATESH Trevino    Copy of this evaluation report is provided to requesting physician.    [facility] Preop Guidelines    Revised Cardiac Risk Index

## 2019-06-11 ENCOUNTER — OFFICE VISIT (OUTPATIENT)
Dept: FAMILY MEDICINE | Facility: OTHER | Age: 69
End: 2019-06-11
Attending: PHYSICIAN ASSISTANT
Payer: COMMERCIAL

## 2019-06-11 VITALS
BODY MASS INDEX: 27.81 KG/M2 | WEIGHT: 157 LBS | DIASTOLIC BLOOD PRESSURE: 70 MMHG | OXYGEN SATURATION: 96 % | HEART RATE: 97 BPM | SYSTOLIC BLOOD PRESSURE: 100 MMHG

## 2019-06-11 DIAGNOSIS — Z01.818 PREOP GENERAL PHYSICAL EXAM: Primary | ICD-10-CM

## 2019-06-11 LAB
ANION GAP SERPL CALCULATED.3IONS-SCNC: 2 MMOL/L (ref 3–14)
BASOPHILS # BLD AUTO: 0 10E9/L (ref 0–0.2)
BASOPHILS NFR BLD AUTO: 0.4 %
BUN SERPL-MCNC: 15 MG/DL (ref 7–30)
CALCIUM SERPL-MCNC: 8.3 MG/DL (ref 8.5–10.1)
CHLORIDE SERPL-SCNC: 100 MMOL/L (ref 94–109)
CO2 SERPL-SCNC: 28 MMOL/L (ref 20–32)
CREAT SERPL-MCNC: 0.76 MG/DL (ref 0.52–1.04)
DIFFERENTIAL METHOD BLD: ABNORMAL
EOSINOPHIL # BLD AUTO: 0.1 10E9/L (ref 0–0.7)
EOSINOPHIL NFR BLD AUTO: 1.8 %
ERYTHROCYTE [DISTWIDTH] IN BLOOD BY AUTOMATED COUNT: 16 % (ref 10–15)
GFR SERPL CREATININE-BSD FRML MDRD: 79 ML/MIN/{1.73_M2}
GLUCOSE SERPL-MCNC: 78 MG/DL (ref 70–99)
HCT VFR BLD AUTO: 27.6 % (ref 35–47)
HGB BLD-MCNC: 9.2 G/DL (ref 11.7–15.7)
IMM GRANULOCYTES # BLD: 0.1 10E9/L (ref 0–0.4)
IMM GRANULOCYTES NFR BLD: 1.8 %
LYMPHOCYTES # BLD AUTO: 2.2 10E9/L (ref 0.8–5.3)
LYMPHOCYTES NFR BLD AUTO: 39.8 %
MCH RBC QN AUTO: 30.8 PG (ref 26.5–33)
MCHC RBC AUTO-ENTMCNC: 33.3 G/DL (ref 31.5–36.5)
MCV RBC AUTO: 92 FL (ref 78–100)
MONOCYTES # BLD AUTO: 0.7 10E9/L (ref 0–1.3)
MONOCYTES NFR BLD AUTO: 12.3 %
NEUTROPHILS # BLD AUTO: 2.4 10E9/L (ref 1.6–8.3)
NEUTROPHILS NFR BLD AUTO: 43.9 %
NRBC # BLD AUTO: 0 10*3/UL
NRBC BLD AUTO-RTO: 0 /100
PLATELET # BLD AUTO: 241 10E9/L (ref 150–450)
POTASSIUM SERPL-SCNC: 3.6 MMOL/L (ref 3.4–5.3)
RBC # BLD AUTO: 2.99 10E12/L (ref 3.8–5.2)
SODIUM SERPL-SCNC: 130 MMOL/L (ref 133–144)
WBC # BLD AUTO: 5.6 10E9/L (ref 4–11)

## 2019-06-11 PROCEDURE — G0463 HOSPITAL OUTPT CLINIC VISIT: HCPCS

## 2019-06-11 PROCEDURE — 85025 COMPLETE CBC W/AUTO DIFF WBC: CPT | Mod: ZL | Performed by: PHYSICIAN ASSISTANT

## 2019-06-11 PROCEDURE — 99214 OFFICE O/P EST MOD 30 MIN: CPT | Mod: 25 | Performed by: PHYSICIAN ASSISTANT

## 2019-06-11 PROCEDURE — 36415 COLL VENOUS BLD VENIPUNCTURE: CPT | Mod: ZL | Performed by: PHYSICIAN ASSISTANT

## 2019-06-11 PROCEDURE — 93005 ELECTROCARDIOGRAM TRACING: CPT

## 2019-06-11 PROCEDURE — 93010 ELECTROCARDIOGRAM REPORT: CPT | Performed by: INTERNAL MEDICINE

## 2019-06-11 PROCEDURE — 80048 BASIC METABOLIC PNL TOTAL CA: CPT | Mod: ZL | Performed by: PHYSICIAN ASSISTANT

## 2019-06-11 ASSESSMENT — PAIN SCALES - GENERAL: PAINLEVEL: NO PAIN (0)

## 2019-06-11 NOTE — NURSING NOTE
"Chief Complaint   Patient presents with     Pre-Op Exam       Initial /70 (Patient Position: Sitting)   Pulse 97   Wt 71.2 kg (157 lb)   SpO2 96%   BMI 27.81 kg/m   Estimated body mass index is 27.81 kg/m  as calculated from the following:    Height as of 5/31/19: 1.6 m (5' 3\").    Weight as of this encounter: 71.2 kg (157 lb).  Medication Reconciliation: complete    Delilah Khanna LPN  "

## 2019-06-12 ENCOUNTER — TELEPHONE (OUTPATIENT)
Dept: FAMILY MEDICINE | Facility: OTHER | Age: 69
End: 2019-06-12

## 2019-06-13 ENCOUNTER — ANESTHESIA EVENT (OUTPATIENT)
Dept: SURGERY | Facility: HOSPITAL | Age: 69
End: 2019-06-13
Payer: MEDICARE

## 2019-06-13 NOTE — ANESTHESIA PREPROCEDURE EVALUATION
Anesthesia Pre-Procedure Evaluation    Patient: Carmelita Watts   MRN: [id number] : 1950          Preoperative Diagnosis: MONOCLONAL GAMMOPATHY    Procedure(s):  BONE MARROW BIOPSY    Past Medical History:   Diagnosis Date     Arthritis      Depressive disorder      Essential hypertension 10/1/2015     Major depressive disorder, recurrent episode, mild (H) 10/1/2015     Mixed hyperlipidemia 10/1/2015     Other specified disorders of bladder 2012    Irritable Bladder     Seasonal allergies 10/1/2015     Unspecified essential hypertension 2007     Unspecified sinusitis (chronic) 2007     Past Surgical History:   Procedure Laterality Date     APPENDECTOMY       CHOLECYSTECTOMY       COLONOSCOPY  2006    repeat 10 years     COLONOSCOPY N/A 2016    Procedure: COLONOSCOPY;  Surgeon: Bhaskar Franklin DO;  Location: [facility]     SINUS SURGERY       TUBAL STERILIZATION         Anesthesia Evaluation     . Pt has had prior anesthetic.     No history of anesthetic complications          ROS/MED HX    ENT/Pulmonary:     (+)allergic rhinitis, , . .    Neurologic:  - neg neurologic ROS     Cardiovascular:     (+) Dyslipidemia, hypertension----. : . . . :. . Previous cardiac testing date:results:Stress Testdate:19 results:FINDINGS: There is good uptake of activity by the left ventricle. The  left ventricular size is normal, with an EDV of 64 ml.     There are no areas of reversible myocardial perfusion deficit to  suggest ischemia. There are no areas of irreversible perfusion deficit  to suggest completed infarct.     Myocardial motility is preserved. The ejection fraction is normal, at  81%.                                                                      IMPRESSION: No evidence of abnormal myocardial perfusion. Preserved  EDV and ejection fraction.ECG reviewed date:18 results:SR with first degree AVB date: results:          METS/Exercise Tolerance:     Hematologic:     (+)  "Other Hematologic Disorder-MONOCLONAL GAMMOPATHY      Musculoskeletal:   (+) arthritis,  other musculoskeletal- DJD      GI/Hepatic:  - neg GI/hepatic ROS       Renal/Genitourinary:     (+) Other Renal/ Genitourinary, bladder disorder      Endo:         Psychiatric:     (+) psychiatric history depression      Infectious Disease:  - neg infectious disease ROS       Malignancy:      - no malignancy   Other:    - neg other ROS                      Physical Exam  Normal systems: cardiovascular, pulmonary and dental    Airway   Mallampati: II  TM distance: >3 FB  Neck ROM: full    Dental     Cardiovascular   Rhythm and rate: regular and normal      Pulmonary    breath sounds clear to auscultation            Lab Results   Component Value Date    WBC 5.6 06/11/2019    HGB 9.2 (L) 06/11/2019    HCT 27.6 (L) 06/11/2019     06/11/2019     (H) 05/03/2019     (L) 06/11/2019    POTASSIUM 3.6 06/11/2019    CHLORIDE 100 06/11/2019    CO2 28 06/11/2019    BUN 15 06/11/2019    CR 0.76 06/11/2019    GLC 78 06/11/2019    PAYAL 8.3 (L) 06/11/2019    ALBUMIN 3.0 (L) 05/31/2019    PROTTOTAL 13.7 (H) 05/31/2019    ALT 53 (H) 05/31/2019    AST 43 05/31/2019    ALKPHOS 105 05/31/2019    BILITOTAL 0.3 05/31/2019    TSH 1.77 11/17/2017       Preop Vitals  BP Readings from Last 3 Encounters:   06/11/19 100/70   05/31/19 110/70   05/03/19 112/60    Pulse Readings from Last 3 Encounters:   06/11/19 97   05/31/19 90   05/03/19 73      Resp Readings from Last 3 Encounters:   05/31/19 18   01/03/19 17   12/31/18 18    SpO2 Readings from Last 3 Encounters:   06/11/19 96%   05/31/19 96%   05/03/19 96%      Temp Readings from Last 1 Encounters:   05/31/19 96.9  F (36.1  C) (Tympanic)    Ht Readings from Last 1 Encounters:   05/31/19 1.6 m (5' 3\")      Wt Readings from Last 1 Encounters:   06/11/19 71.2 kg (157 lb)    Estimated body mass index is 27.81 kg/m  as calculated from the following:    Height as of 5/31/19: 1.6 m (5' 3\").    " Weight as of 6/11/19: 71.2 kg (157 lb).       Anesthesia Plan      History & Physical Review  History and physical reviewed and following examination; no interval change.    ASA Status:  2 .    NPO Status:  > 8 hours    Plan for MAC with Propofol and Intravenous induction. Maintenance will be TIVA.  Reason for MAC:  Chronic cardiopulmonary disease (G9)  PONV prophylaxis:  Ondansetron (or other 5HT-3) and Dexamethasone or Solumedrol  H and P date 6/11/19      Postoperative Care  Postoperative pain management:  IV analgesics and Oral pain medications.      Consents  Anesthetic plan, risks, benefits and alternatives discussed with:  Patient..                 FLO Sunshine CRNA

## 2019-06-18 ENCOUNTER — HOSPITAL ENCOUNTER (OUTPATIENT)
Facility: HOSPITAL | Age: 69
Discharge: HOME | End: 2019-06-18
Attending: PATHOLOGY | Admitting: PATHOLOGY
Payer: MEDICARE

## 2019-06-18 ENCOUNTER — ANESTHESIA (OUTPATIENT)
Dept: SURGERY | Facility: HOSPITAL | Age: 69
End: 2019-06-18
Payer: MEDICARE

## 2019-06-18 VITALS
RESPIRATION RATE: 16 BRPM | OXYGEN SATURATION: 96 % | DIASTOLIC BLOOD PRESSURE: 66 MMHG | WEIGHT: 158 LBS | BODY MASS INDEX: 27.99 KG/M2 | SYSTOLIC BLOOD PRESSURE: 111 MMHG | TEMPERATURE: 98.8 F

## 2019-06-18 LAB
BASOPHILS # BLD AUTO: 0 10E9/L (ref 0–0.2)
BASOPHILS NFR BLD AUTO: 0.5 %
DIFFERENTIAL METHOD BLD: ABNORMAL
EOSINOPHIL # BLD AUTO: 0.1 10E9/L (ref 0–0.7)
EOSINOPHIL NFR BLD AUTO: 2.3 %
ERYTHROCYTE [DISTWIDTH] IN BLOOD BY AUTOMATED COUNT: 16.2 % (ref 10–15)
HCT VFR BLD AUTO: 27.7 % (ref 35–47)
HGB BLD-MCNC: 9.3 G/DL (ref 11.7–15.7)
IMM GRANULOCYTES # BLD: 0.3 10E9/L (ref 0–0.4)
IMM GRANULOCYTES NFR BLD: 4.4 %
LYMPHOCYTES # BLD AUTO: 1.7 10E9/L (ref 0.8–5.3)
LYMPHOCYTES NFR BLD AUTO: 29.7 %
MCH RBC QN AUTO: 30.4 PG (ref 26.5–33)
MCHC RBC AUTO-ENTMCNC: 33.6 G/DL (ref 31.5–36.5)
MCV RBC AUTO: 91 FL (ref 78–100)
MONOCYTES # BLD AUTO: 0.8 10E9/L (ref 0–1.3)
MONOCYTES NFR BLD AUTO: 13.6 %
NEUTROPHILS # BLD AUTO: 2.8 10E9/L (ref 1.6–8.3)
NEUTROPHILS NFR BLD AUTO: 49.5 %
NRBC # BLD AUTO: 0 10*3/UL
NRBC BLD AUTO-RTO: 0 /100
PLATELET # BLD AUTO: 223 10E9/L (ref 150–450)
RBC # BLD AUTO: 3.06 10E12/L (ref 3.8–5.2)
RETICS # AUTO: 57.5 10E9/L (ref 25–95)
RETICS/RBC NFR AUTO: 1.9 % (ref 0.5–2)
WBC # BLD AUTO: 5.7 10E9/L (ref 4–11)

## 2019-06-18 PROCEDURE — 38221 DX BONE MARROW BIOPSIES: CPT | Performed by: ANESTHESIOLOGY

## 2019-06-18 PROCEDURE — 88185 FLOWCYTOMETRY/TC ADD-ON: CPT | Performed by: PATHOLOGY

## 2019-06-18 PROCEDURE — 25800030 ZZH RX IP 258 OP 636: Performed by: NURSE ANESTHETIST, CERTIFIED REGISTERED

## 2019-06-18 PROCEDURE — 37000008 ZZH ANESTHESIA TECHNICAL FEE, 1ST 30 MIN: Performed by: PATHOLOGY

## 2019-06-18 PROCEDURE — 25000125 ZZHC RX 250: Performed by: NURSE ANESTHETIST, CERTIFIED REGISTERED

## 2019-06-18 PROCEDURE — 40000948 ZZHCL STATISTIC BONE MARROW ASP TC 85097: Performed by: INTERNAL MEDICINE

## 2019-06-18 PROCEDURE — 27210794 ZZH OR GENERAL SUPPLY STERILE: Performed by: PATHOLOGY

## 2019-06-18 PROCEDURE — 25000128 H RX IP 250 OP 636: Performed by: NURSE ANESTHETIST, CERTIFIED REGISTERED

## 2019-06-18 PROCEDURE — 40000305 ZZH STATISTIC PRE PROC ASSESS I: Performed by: PATHOLOGY

## 2019-06-18 PROCEDURE — 88305 TISSUE EXAM BY PATHOLOGIST: CPT | Mod: TC | Performed by: INTERNAL MEDICINE

## 2019-06-18 PROCEDURE — 71000027 ZZH RECOVERY PHASE 2 EACH 15 MINS: Performed by: PATHOLOGY

## 2019-06-18 PROCEDURE — 85045 AUTOMATED RETICULOCYTE COUNT: CPT | Performed by: PATHOLOGY

## 2019-06-18 PROCEDURE — 88271 CYTOGENETICS DNA PROBE: CPT | Mod: GZ | Performed by: INTERNAL MEDICINE

## 2019-06-18 PROCEDURE — 36000050 ZZH SURGERY LEVEL 2 1ST 30 MIN: Performed by: PATHOLOGY

## 2019-06-18 PROCEDURE — 38221 DX BONE MARROW BIOPSIES: CPT | Performed by: NURSE ANESTHETIST, CERTIFIED REGISTERED

## 2019-06-18 PROCEDURE — 88237 TISSUE CULTURE BONE MARROW: CPT | Performed by: INTERNAL MEDICINE

## 2019-06-18 PROCEDURE — 25000125 ZZHC RX 250: Performed by: PATHOLOGY

## 2019-06-18 PROCEDURE — 88275 CYTOGENETICS 100-300: CPT | Mod: GZ | Performed by: INTERNAL MEDICINE

## 2019-06-18 PROCEDURE — 40000847 ZZHCL STATISTIC MORPHOLOGY W/INTERP HISTOLOGY TC 85060: Performed by: INTERNAL MEDICINE

## 2019-06-18 PROCEDURE — 85025 COMPLETE CBC W/AUTO DIFF WBC: CPT | Performed by: PATHOLOGY

## 2019-06-18 PROCEDURE — 88311 DECALCIFY TISSUE: CPT | Mod: TC | Performed by: INTERNAL MEDICINE

## 2019-06-18 PROCEDURE — 36415 COLL VENOUS BLD VENIPUNCTURE: CPT | Performed by: PATHOLOGY

## 2019-06-18 PROCEDURE — 88161 CYTOPATH SMEAR OTHER SOURCE: CPT | Mod: TC | Performed by: INTERNAL MEDICINE

## 2019-06-18 PROCEDURE — 40001004 ZZHCL STATISTIC FLOW INT 9-15 ABY TC 88188: Performed by: PATHOLOGY

## 2019-06-18 PROCEDURE — 88184 FLOWCYTOMETRY/ TC 1 MARKER: CPT | Performed by: PATHOLOGY

## 2019-06-18 RX ORDER — PROPOFOL 10 MG/ML
INJECTION, EMULSION INTRAVENOUS PRN
Status: DISCONTINUED | OUTPATIENT
Start: 2019-06-18 | End: 2019-06-18

## 2019-06-18 RX ORDER — NALOXONE HYDROCHLORIDE 0.4 MG/ML
.1-.4 INJECTION, SOLUTION INTRAMUSCULAR; INTRAVENOUS; SUBCUTANEOUS
Status: DISCONTINUED | OUTPATIENT
Start: 2019-06-18 | End: 2019-06-18 | Stop reason: HOSPADM

## 2019-06-18 RX ORDER — ONDANSETRON 4 MG/1
4 TABLET, ORALLY DISINTEGRATING ORAL EVERY 30 MIN PRN
Status: DISCONTINUED | OUTPATIENT
Start: 2019-06-18 | End: 2019-06-18 | Stop reason: HOSPADM

## 2019-06-18 RX ORDER — ONDANSETRON 2 MG/ML
4 INJECTION INTRAMUSCULAR; INTRAVENOUS EVERY 30 MIN PRN
Status: DISCONTINUED | OUTPATIENT
Start: 2019-06-18 | End: 2019-06-18 | Stop reason: HOSPADM

## 2019-06-18 RX ORDER — LIDOCAINE HYDROCHLORIDE 10 MG/ML
8-10 INJECTION, SOLUTION EPIDURAL; INFILTRATION; INTRACAUDAL; PERINEURAL
Status: DISCONTINUED | OUTPATIENT
Start: 2019-06-18 | End: 2019-06-18 | Stop reason: HOSPADM

## 2019-06-18 RX ORDER — LIDOCAINE HYDROCHLORIDE 20 MG/ML
INJECTION, SOLUTION INFILTRATION; PERINEURAL PRN
Status: DISCONTINUED | OUTPATIENT
Start: 2019-06-18 | End: 2019-06-18

## 2019-06-18 RX ORDER — SODIUM CHLORIDE, SODIUM LACTATE, POTASSIUM CHLORIDE, CALCIUM CHLORIDE 600; 310; 30; 20 MG/100ML; MG/100ML; MG/100ML; MG/100ML
INJECTION, SOLUTION INTRAVENOUS CONTINUOUS
Status: DISCONTINUED | OUTPATIENT
Start: 2019-06-18 | End: 2019-06-18 | Stop reason: HOSPADM

## 2019-06-18 RX ORDER — MEPERIDINE HYDROCHLORIDE 50 MG/ML
12.5 INJECTION INTRAMUSCULAR; INTRAVENOUS; SUBCUTANEOUS
Status: DISCONTINUED | OUTPATIENT
Start: 2019-06-18 | End: 2019-06-18 | Stop reason: HOSPADM

## 2019-06-18 RX ORDER — LIDOCAINE 40 MG/G
CREAM TOPICAL
Status: DISCONTINUED | OUTPATIENT
Start: 2019-06-18 | End: 2019-06-18 | Stop reason: HOSPADM

## 2019-06-18 RX ORDER — SODIUM CHLORIDE 9 MG/ML
INJECTION, SOLUTION INTRAVENOUS CONTINUOUS
Status: DISCONTINUED | OUTPATIENT
Start: 2019-06-18 | End: 2019-06-18 | Stop reason: HOSPADM

## 2019-06-18 RX ORDER — LIDOCAINE HYDROCHLORIDE 10 MG/ML
INJECTION, SOLUTION EPIDURAL; INFILTRATION; INTRACAUDAL; PERINEURAL
Status: DISCONTINUED
Start: 2019-06-18 | End: 2019-06-18 | Stop reason: HOSPADM

## 2019-06-18 RX ADMIN — LIDOCAINE HYDROCHLORIDE 40 MG: 20 INJECTION, SOLUTION INFILTRATION; PERINEURAL at 07:59

## 2019-06-18 RX ADMIN — PROPOFOL 50 MG: 10 INJECTION, EMULSION INTRAVENOUS at 07:59

## 2019-06-18 RX ADMIN — SODIUM CHLORIDE: 9 INJECTION, SOLUTION INTRAVENOUS at 07:41

## 2019-06-18 RX ADMIN — MIDAZOLAM 2 MG: 1 INJECTION INTRAMUSCULAR; INTRAVENOUS at 07:57

## 2019-06-18 RX ADMIN — PROPOFOL 50 MG: 10 INJECTION, EMULSION INTRAVENOUS at 08:05

## 2019-06-18 NOTE — PROCEDURES
Bone Marrow Biopsy or Aspiration:       Carmelita Watts  MRN# [id number]   2019, 8:24 AM Diagnosis or Indication: Monoclonal gammopathy     Patient denies allergies to iodine or local anesthesia.  ALLERGIES:  Allergies   Allergen Reactions     Lisinopril Cough     Phenylephrine Hcl Other (See Comments)     **Entex  HEADACHE (SEVERE)     Phenylpropanolamine Other (See Comments)     **Entex  HEADACHE (SEVERE)     Pseudoephedrine Tannate Other (See Comments)     **Entex  HEADACHE (SEVERE)     Levofloxacin Rash     **Levaquin     Moxifloxacin Hcl [Avelox] Rash          Premedication per physician order (see patient's chart).    The informed consent process including the risks, benefits, and alternatives were discussed with the patient/representative.  Information discussed included the followin. The major risk is bleeding, which occurs in approximately 1 out of 100 patients.  If that occurs, the patient is to return to the Emergency Room and the treatment is pressure.  The patient would notice blood soaking through the bandage or running down the leg.    2. It's extremely rare, but the patient may get a skin infection or an infection of the bone.  If the patient gets a skin infection, the patient may notice a red or draining skin wound. The patient is to go to the clinic or the Emergency Room, depending on the time of day or the day of the week, for treatment.  Infection in the bone may show up days later with increasing pain, fever, chills, or even confusion.  This would be a medical emergency requiring antibiotic therapy, for which the patient needs to go immediately to the clinic or the Emergency Room.    The patient does understand the risks, benefits and the procedure and agrees to the performance of a bone marrow biopsy and aspirate.        The patient's identification was positively verified by identification band.  Informed consent was obtained (see the completed Affirmation of Consent for  Bone Marrow Aspiration and/or Biopsy Procedure(s) form in the patient's chart).   The patient was placed in the prone position and the bony landmarks of the pelvis were identified.  The patient was then given anesthesia and was under MAC.  Medical staff reconfirmed the patient's name, date of birth, procedure, and the procedure site marking.  The skin surface over the posterior superior iliac crest was scrubbed with Betadine and draped in a sterile fashion with sterile towels to create a sterile field.  The skin and periosteal surface of the:    Left posterior iliac crest (LPIC)    was anesthetized with a total of 5 mL of 1% Xylocaine and a small incision was made through the skin over the corresponding site with a scalpel.    Trephine bone marrow core was obtained from the LPIC (2).  Bone marrow aspirate was obtained from the LPIC (8) for:      Morphology  Immunophenotyping  Molecular Diagnositcs: Gene rearrangements    Direct pressure was applied to the biopsy site with sterile gauze.  The biopsy site was cleaned and dressed by attending nurse.   Post-procedure wound care instructions, including routine dressing instructions and analgesia, were given to the patient.  Discharge instructions included refraining from excessive physical activity for the remainder of the day and keep the wound clean and dry.  Tomorrow, the patient may resume all normal activities, including bathing.  The patient may remove the bandage tomorrow and replace it with a band-aid, if it is still oozing.                 Attestation:  This patient has been seen and evaluated by me, Maciej Sanz MD.                                      The patient is told to refrain from excessive physical activity for the remainder of the day and keep the wound clean and dry.  Tomorrow, the patient may resume all normal activities, including bathing.  The patient may remove the bandage tomorrow and replace it with a band-aid, if it is still oozing.     l.

## 2019-06-18 NOTE — OR NURSING
Patient and responsible adult given discharge instructions with no questions regarding instructions. Ferdinand score 20. Pain level 0/10.  Discharged from unit ambulatory. Patient discharged to home with  driving pt home.  Drsg on left posterior hip clean, dry and intact.

## 2019-06-18 NOTE — ANESTHESIA POSTPROCEDURE EVALUATION
Patient: Carmelita Watts    Procedure(s):  BONE MARROW BIOPSY    Diagnosis:MONOCLONAL GAMMOPATHY  Diagnosis Additional Information: No value filed.    Anesthesia Type:  MAC    Note:  Anesthesia Post Evaluation    Patient location during evaluation: Phase 2 and Bedside  Patient participation: Able to fully participate in evaluation  Level of consciousness: awake and alert  Pain management: adequate  Airway patency: patent  Cardiovascular status: acceptable  Respiratory status: acceptable  Hydration status: stable  PONV: none     Anesthetic complications: None          Last vitals:  Vitals:    06/18/19 0830 06/18/19 0835 06/18/19 0840   BP: 111/73 116/67    Resp: 16 16 (P) 16   Temp:      SpO2: 93% 93%          Electronically Signed By: Rafal Bunn MD  June 18, 2019  8:41 AM

## 2019-06-18 NOTE — ANESTHESIA CARE TRANSFER NOTE
Patient: Carmelita Watts    Procedure(s):  BONE MARROW BIOPSY    Diagnosis: MONOCLONAL GAMMOPATHY  Diagnosis Additional Information: No value filed.    Anesthesia Type:   MAC     Note:  Airway :Nasal Cannula  Patient transferred to:Phase II  Handoff Report: Identifed the Patient, Identified the Reponsible Provider, Reviewed the pertinent medical history, Discussed the surgical course, Reviewed Intra-OP anesthesia mangement and issues during anesthesia, Set expectations for post-procedure period and Allowed opportunity for questions and acknowledgement of understanding      Vitals: (Last set prior to Anesthesia Care Transfer)    CRNA VITALS  6/18/2019 0743 - 6/18/2019 0815      6/18/2019             Pulse:  82    SpO2:  96 %    Resp Rate (set):  8                Electronically Signed By: FLO Houser CRNA  June 18, 2019  8:15 AM

## 2019-06-18 NOTE — OR NURSING
Steri strip, benzoin, 4x4, foam tape to Left Posterior Superior Iliac Crest - site for bone marrow biopsy and aspirate.

## 2019-06-18 NOTE — DISCHARGE INSTRUCTIONS
Post-Anesthesia Patient Instructions    IMMEDIATELY FOLLOWING SURGERY:  Do not drive or operate machinery for the first twenty four hours after surgery.  Do not make any important decisions for twenty four hours after surgery or while taking narcotic pain medications or sedatives.  If you develop intractable nausea and vomiting or a severe headache please notify your doctor immediately.    FOLLOW-UP:  Please make an appointment with your surgeon as instructed. You do not need to follow up with anesthesia unless specifically instructed to do so.    WOUND CARE INSTRUCTIONS (if applicable):  Keep a dry clean dressing on the anesthesia/puncture wound site if there is drainage.  Once the wound has quit draining you may leave it open to air.  Generally you should leave the bandage intact for twenty four hours unless there is drainage.  If the epidural site drains for more than 36-48 hours please call the anesthesia department.    QUESTIONS?:  Please feel free to call your physician or the hospital  if you have any questions, and they will be happy to assist you.       DISCHARGE INSTRUCTIONS  Bone Marrow Biopsy      IMPORTANT: As you prepare for discharge, the following information will help you return to your best level of health.       This Information Is About Your Follow Up Care   Call your doctor if you do not get better. Call sooner if you feel worse. You can reach your doctor by calling their clinic phone number.                                    This Information Is About Procedures    BONE MARROW BIOPSY.  Your bone marrow was taken out through the hip bone. The lab will check for abnormal cells. We will examine it and your doctor will know the results in 2-3 days. Your biopsy site may be tender.    Do the following:    Rest today and slowly increase your activity.    Avoid heavy exercise and activity for 6 weeks.    Take pain medicines exactly as prescribed.    Call your doctor if you  have:    Increased bleeding from the site of the biopsy.    Increased drainage, redness, or swelling from the wound.    Red streaks from the wound.    Foul odor or pus from the dressing or wound.    Chills or fever over 101 degrees (by mouth).    Any new problems or concerns.

## 2019-06-20 ENCOUNTER — HOSPITAL ENCOUNTER (OUTPATIENT)
Dept: GENERAL RADIOLOGY | Facility: HOSPITAL | Age: 69
Discharge: HOME | End: 2019-06-20
Attending: INTERNAL MEDICINE | Admitting: INTERNAL MEDICINE
Payer: MEDICARE

## 2019-06-20 DIAGNOSIS — D47.2 MGUS (MONOCLONAL GAMMOPATHY OF UNKNOWN SIGNIFICANCE): ICD-10-CM

## 2019-06-20 DIAGNOSIS — R77.9 ELEVATED BLOOD PROTEIN: ICD-10-CM

## 2019-06-20 LAB — COPATH REPORT: NORMAL

## 2019-06-20 PROCEDURE — 77075 RADEX OSSEOUS SURVEY COMPL: CPT | Mod: TC

## 2019-06-24 ENCOUNTER — INFUSION THERAPY VISIT (OUTPATIENT)
Dept: INFUSION THERAPY | Facility: OTHER | Age: 69
End: 2019-06-24
Attending: INTERNAL MEDICINE
Payer: COMMERCIAL

## 2019-06-24 ENCOUNTER — PATIENT OUTREACH (OUTPATIENT)
Dept: ONCOLOGY | Facility: OTHER | Age: 69
End: 2019-06-24

## 2019-06-24 ENCOUNTER — ONCOLOGY VISIT (OUTPATIENT)
Dept: ONCOLOGY | Facility: OTHER | Age: 69
End: 2019-06-24
Attending: INTERNAL MEDICINE
Payer: MEDICARE

## 2019-06-24 VITALS
BODY MASS INDEX: 27.89 KG/M2 | SYSTOLIC BLOOD PRESSURE: 120 MMHG | DIASTOLIC BLOOD PRESSURE: 80 MMHG | HEART RATE: 95 BPM | RESPIRATION RATE: 18 BRPM | WEIGHT: 157.4 LBS | OXYGEN SATURATION: 96 % | TEMPERATURE: 98.1 F | HEIGHT: 63 IN

## 2019-06-24 DIAGNOSIS — C90.00 MULTIPLE MYELOMA NOT HAVING ACHIEVED REMISSION (H): Primary | ICD-10-CM

## 2019-06-24 DIAGNOSIS — R77.9 ELEVATED BLOOD PROTEIN: ICD-10-CM

## 2019-06-24 DIAGNOSIS — D47.2 MGUS (MONOCLONAL GAMMOPATHY OF UNKNOWN SIGNIFICANCE): ICD-10-CM

## 2019-06-24 PROCEDURE — 99214 OFFICE O/P EST MOD 30 MIN: CPT | Performed by: INTERNAL MEDICINE

## 2019-06-24 PROCEDURE — G0463 HOSPITAL OUTPT CLINIC VISIT: HCPCS

## 2019-06-24 RX ORDER — EPINEPHRINE 0.3 MG/.3ML
0.3 INJECTION SUBCUTANEOUS EVERY 5 MIN PRN
Status: CANCELLED | OUTPATIENT
Start: 2019-07-16

## 2019-06-24 RX ORDER — NALOXONE HYDROCHLORIDE 0.4 MG/ML
.1-.4 INJECTION, SOLUTION INTRAMUSCULAR; INTRAVENOUS; SUBCUTANEOUS
Status: CANCELLED | OUTPATIENT
Start: 2019-07-02

## 2019-06-24 RX ORDER — DIPHENHYDRAMINE HYDROCHLORIDE 50 MG/ML
50 INJECTION INTRAMUSCULAR; INTRAVENOUS
Status: CANCELLED
Start: 2019-07-16

## 2019-06-24 RX ORDER — METHYLPREDNISOLONE SODIUM SUCCINATE 125 MG/2ML
125 INJECTION, POWDER, LYOPHILIZED, FOR SOLUTION INTRAMUSCULAR; INTRAVENOUS
Status: CANCELLED
Start: 2019-07-19

## 2019-06-24 RX ORDER — METHYLPREDNISOLONE SODIUM SUCCINATE 125 MG/2ML
125 INJECTION, POWDER, LYOPHILIZED, FOR SOLUTION INTRAMUSCULAR; INTRAVENOUS
Status: CANCELLED
Start: 2019-07-16

## 2019-06-24 RX ORDER — EPINEPHRINE 1 MG/ML
0.3 INJECTION, SOLUTION, CONCENTRATE INTRAVENOUS EVERY 5 MIN PRN
Status: CANCELLED | OUTPATIENT
Start: 2019-06-28

## 2019-06-24 RX ORDER — NALOXONE HYDROCHLORIDE 0.4 MG/ML
.1-.4 INJECTION, SOLUTION INTRAMUSCULAR; INTRAVENOUS; SUBCUTANEOUS
Status: CANCELLED | OUTPATIENT
Start: 2019-07-23

## 2019-06-24 RX ORDER — EPINEPHRINE 1 MG/ML
0.3 INJECTION, SOLUTION, CONCENTRATE INTRAVENOUS EVERY 5 MIN PRN
Status: CANCELLED | OUTPATIENT
Start: 2019-06-25

## 2019-06-24 RX ORDER — LORAZEPAM 0.5 MG/1
0.5 TABLET ORAL EVERY 4 HOURS PRN
Qty: 30 TABLET | Refills: 3 | Status: SHIPPED | OUTPATIENT
Start: 2019-06-24 | End: 2019-11-07

## 2019-06-24 RX ORDER — DEXAMETHASONE 4 MG/1
40 TABLET ORAL WEEKLY
Qty: 30 TABLET | Refills: 3 | Status: SHIPPED | OUTPATIENT
Start: 2019-06-24 | End: 2020-02-12

## 2019-06-24 RX ORDER — ALBUTEROL SULFATE 90 UG/1
1-2 AEROSOL, METERED RESPIRATORY (INHALATION)
Status: CANCELLED
Start: 2019-07-23

## 2019-06-24 RX ORDER — SODIUM CHLORIDE 9 MG/ML
1000 INJECTION, SOLUTION INTRAVENOUS CONTINUOUS PRN
Status: CANCELLED
Start: 2019-07-02

## 2019-06-24 RX ORDER — NALOXONE HYDROCHLORIDE 0.4 MG/ML
.1-.4 INJECTION, SOLUTION INTRAMUSCULAR; INTRAVENOUS; SUBCUTANEOUS
Status: CANCELLED | OUTPATIENT
Start: 2019-07-05

## 2019-06-24 RX ORDER — ALBUTEROL SULFATE 0.83 MG/ML
2.5 SOLUTION RESPIRATORY (INHALATION)
Status: CANCELLED | OUTPATIENT
Start: 2019-07-23

## 2019-06-24 RX ORDER — METHYLPREDNISOLONE SODIUM SUCCINATE 125 MG/2ML
125 INJECTION, POWDER, LYOPHILIZED, FOR SOLUTION INTRAMUSCULAR; INTRAVENOUS
Status: CANCELLED
Start: 2019-06-28

## 2019-06-24 RX ORDER — MEPERIDINE HYDROCHLORIDE 25 MG/ML
25 INJECTION INTRAMUSCULAR; INTRAVENOUS; SUBCUTANEOUS EVERY 30 MIN PRN
Status: CANCELLED | OUTPATIENT
Start: 2019-06-28

## 2019-06-24 RX ORDER — ALBUTEROL SULFATE 90 UG/1
1-2 AEROSOL, METERED RESPIRATORY (INHALATION)
Status: CANCELLED
Start: 2019-07-02

## 2019-06-24 RX ORDER — MEPERIDINE HYDROCHLORIDE 25 MG/ML
25 INJECTION INTRAMUSCULAR; INTRAVENOUS; SUBCUTANEOUS EVERY 30 MIN PRN
Status: CANCELLED | OUTPATIENT
Start: 2019-07-05

## 2019-06-24 RX ORDER — DIPHENHYDRAMINE HYDROCHLORIDE 50 MG/ML
50 INJECTION INTRAMUSCULAR; INTRAVENOUS
Status: CANCELLED
Start: 2019-06-28

## 2019-06-24 RX ORDER — DIPHENHYDRAMINE HYDROCHLORIDE 50 MG/ML
50 INJECTION INTRAMUSCULAR; INTRAVENOUS
Status: CANCELLED
Start: 2019-07-02

## 2019-06-24 RX ORDER — LORAZEPAM 2 MG/ML
0.5 INJECTION INTRAMUSCULAR EVERY 4 HOURS PRN
Status: CANCELLED
Start: 2019-07-26

## 2019-06-24 RX ORDER — EPINEPHRINE 1 MG/ML
0.3 INJECTION, SOLUTION, CONCENTRATE INTRAVENOUS EVERY 5 MIN PRN
Status: CANCELLED | OUTPATIENT
Start: 2019-07-02

## 2019-06-24 RX ORDER — PROCHLORPERAZINE MALEATE 10 MG
10 TABLET ORAL EVERY 6 HOURS PRN
Qty: 30 TABLET | Refills: 3 | Status: SHIPPED | OUTPATIENT
Start: 2019-06-24 | End: 2022-11-16

## 2019-06-24 RX ORDER — ALBUTEROL SULFATE 0.83 MG/ML
2.5 SOLUTION RESPIRATORY (INHALATION)
Status: CANCELLED | OUTPATIENT
Start: 2019-07-16

## 2019-06-24 RX ORDER — ALBUTEROL SULFATE 0.83 MG/ML
2.5 SOLUTION RESPIRATORY (INHALATION)
Status: CANCELLED | OUTPATIENT
Start: 2019-07-26

## 2019-06-24 RX ORDER — EPINEPHRINE 0.3 MG/.3ML
0.3 INJECTION SUBCUTANEOUS EVERY 5 MIN PRN
Status: CANCELLED | OUTPATIENT
Start: 2019-06-28

## 2019-06-24 RX ORDER — MEPERIDINE HYDROCHLORIDE 25 MG/ML
25 INJECTION INTRAMUSCULAR; INTRAVENOUS; SUBCUTANEOUS EVERY 30 MIN PRN
Status: CANCELLED | OUTPATIENT
Start: 2019-07-02

## 2019-06-24 RX ORDER — SODIUM CHLORIDE 9 MG/ML
1000 INJECTION, SOLUTION INTRAVENOUS CONTINUOUS PRN
Status: CANCELLED
Start: 2019-07-23

## 2019-06-24 RX ORDER — NALOXONE HYDROCHLORIDE 0.4 MG/ML
.1-.4 INJECTION, SOLUTION INTRAMUSCULAR; INTRAVENOUS; SUBCUTANEOUS
Status: CANCELLED | OUTPATIENT
Start: 2019-06-25

## 2019-06-24 RX ORDER — LORAZEPAM 2 MG/ML
0.5 INJECTION INTRAMUSCULAR EVERY 4 HOURS PRN
Status: CANCELLED
Start: 2019-07-23

## 2019-06-24 RX ORDER — DIPHENHYDRAMINE HYDROCHLORIDE 50 MG/ML
50 INJECTION INTRAMUSCULAR; INTRAVENOUS
Status: CANCELLED
Start: 2019-07-23

## 2019-06-24 RX ORDER — DIPHENHYDRAMINE HYDROCHLORIDE 50 MG/ML
50 INJECTION INTRAMUSCULAR; INTRAVENOUS
Status: CANCELLED
Start: 2019-07-26

## 2019-06-24 RX ORDER — DIPHENHYDRAMINE HYDROCHLORIDE 50 MG/ML
50 INJECTION INTRAMUSCULAR; INTRAVENOUS
Status: CANCELLED
Start: 2019-07-19

## 2019-06-24 RX ORDER — EPINEPHRINE 0.3 MG/.3ML
0.3 INJECTION SUBCUTANEOUS EVERY 5 MIN PRN
Status: CANCELLED | OUTPATIENT
Start: 2019-07-26

## 2019-06-24 RX ORDER — ALBUTEROL SULFATE 90 UG/1
1-2 AEROSOL, METERED RESPIRATORY (INHALATION)
Status: CANCELLED
Start: 2019-06-28

## 2019-06-24 RX ORDER — LORAZEPAM 2 MG/ML
0.5 INJECTION INTRAMUSCULAR EVERY 4 HOURS PRN
Status: CANCELLED
Start: 2019-07-19

## 2019-06-24 RX ORDER — EPINEPHRINE 0.3 MG/.3ML
0.3 INJECTION SUBCUTANEOUS EVERY 5 MIN PRN
Status: CANCELLED | OUTPATIENT
Start: 2019-07-05

## 2019-06-24 RX ORDER — SODIUM CHLORIDE 9 MG/ML
1000 INJECTION, SOLUTION INTRAVENOUS CONTINUOUS PRN
Status: CANCELLED
Start: 2019-07-05

## 2019-06-24 RX ORDER — SODIUM CHLORIDE 9 MG/ML
1000 INJECTION, SOLUTION INTRAVENOUS CONTINUOUS PRN
Status: CANCELLED
Start: 2019-07-16

## 2019-06-24 RX ORDER — MEPERIDINE HYDROCHLORIDE 25 MG/ML
25 INJECTION INTRAMUSCULAR; INTRAVENOUS; SUBCUTANEOUS EVERY 30 MIN PRN
Status: CANCELLED | OUTPATIENT
Start: 2019-07-23

## 2019-06-24 RX ORDER — EPINEPHRINE 0.3 MG/.3ML
0.3 INJECTION SUBCUTANEOUS EVERY 5 MIN PRN
Status: CANCELLED | OUTPATIENT
Start: 2019-07-23

## 2019-06-24 RX ORDER — ACYCLOVIR 400 MG/1
400 TABLET ORAL 2 TIMES DAILY
Qty: 60 TABLET | Refills: 5 | Status: SHIPPED | OUTPATIENT
Start: 2019-06-24 | End: 2019-12-24

## 2019-06-24 RX ORDER — NALOXONE HYDROCHLORIDE 0.4 MG/ML
.1-.4 INJECTION, SOLUTION INTRAMUSCULAR; INTRAVENOUS; SUBCUTANEOUS
Status: CANCELLED | OUTPATIENT
Start: 2019-07-16

## 2019-06-24 RX ORDER — METHYLPREDNISOLONE SODIUM SUCCINATE 125 MG/2ML
125 INJECTION, POWDER, LYOPHILIZED, FOR SOLUTION INTRAMUSCULAR; INTRAVENOUS
Status: CANCELLED
Start: 2019-07-05

## 2019-06-24 RX ORDER — ALBUTEROL SULFATE 0.83 MG/ML
2.5 SOLUTION RESPIRATORY (INHALATION)
Status: CANCELLED | OUTPATIENT
Start: 2019-07-05

## 2019-06-24 RX ORDER — SODIUM CHLORIDE 9 MG/ML
1000 INJECTION, SOLUTION INTRAVENOUS CONTINUOUS PRN
Status: CANCELLED
Start: 2019-06-28

## 2019-06-24 RX ORDER — METHYLPREDNISOLONE SODIUM SUCCINATE 125 MG/2ML
125 INJECTION, POWDER, LYOPHILIZED, FOR SOLUTION INTRAMUSCULAR; INTRAVENOUS
Status: CANCELLED
Start: 2019-07-02

## 2019-06-24 RX ORDER — ALBUTEROL SULFATE 90 UG/1
1-2 AEROSOL, METERED RESPIRATORY (INHALATION)
Status: CANCELLED
Start: 2019-07-05

## 2019-06-24 RX ORDER — EPINEPHRINE 0.3 MG/.3ML
0.3 INJECTION SUBCUTANEOUS EVERY 5 MIN PRN
Status: CANCELLED | OUTPATIENT
Start: 2019-06-25

## 2019-06-24 RX ORDER — MEPERIDINE HYDROCHLORIDE 25 MG/ML
25 INJECTION INTRAMUSCULAR; INTRAVENOUS; SUBCUTANEOUS EVERY 30 MIN PRN
Status: CANCELLED | OUTPATIENT
Start: 2019-06-25

## 2019-06-24 RX ORDER — EPINEPHRINE 0.3 MG/.3ML
0.3 INJECTION SUBCUTANEOUS EVERY 5 MIN PRN
Status: CANCELLED | OUTPATIENT
Start: 2019-07-02

## 2019-06-24 RX ORDER — NALOXONE HYDROCHLORIDE 0.4 MG/ML
.1-.4 INJECTION, SOLUTION INTRAMUSCULAR; INTRAVENOUS; SUBCUTANEOUS
Status: CANCELLED | OUTPATIENT
Start: 2019-06-28

## 2019-06-24 RX ORDER — DIPHENHYDRAMINE HYDROCHLORIDE 50 MG/ML
50 INJECTION INTRAMUSCULAR; INTRAVENOUS
Status: CANCELLED
Start: 2019-07-05

## 2019-06-24 RX ORDER — LORAZEPAM 2 MG/ML
0.5 INJECTION INTRAMUSCULAR EVERY 4 HOURS PRN
Status: CANCELLED
Start: 2019-06-28

## 2019-06-24 RX ORDER — EPINEPHRINE 1 MG/ML
0.3 INJECTION, SOLUTION, CONCENTRATE INTRAVENOUS EVERY 5 MIN PRN
Status: CANCELLED | OUTPATIENT
Start: 2019-07-16

## 2019-06-24 RX ORDER — SODIUM CHLORIDE 9 MG/ML
1000 INJECTION, SOLUTION INTRAVENOUS CONTINUOUS PRN
Status: CANCELLED
Start: 2019-07-19

## 2019-06-24 RX ORDER — METHYLPREDNISOLONE SODIUM SUCCINATE 125 MG/2ML
125 INJECTION, POWDER, LYOPHILIZED, FOR SOLUTION INTRAMUSCULAR; INTRAVENOUS
Status: CANCELLED
Start: 2019-06-25

## 2019-06-24 RX ORDER — ALBUTEROL SULFATE 90 UG/1
1-2 AEROSOL, METERED RESPIRATORY (INHALATION)
Status: CANCELLED
Start: 2019-07-16

## 2019-06-24 RX ORDER — EPINEPHRINE 1 MG/ML
0.3 INJECTION, SOLUTION, CONCENTRATE INTRAVENOUS EVERY 5 MIN PRN
Status: CANCELLED | OUTPATIENT
Start: 2019-07-23

## 2019-06-24 RX ORDER — ALBUTEROL SULFATE 90 UG/1
1-2 AEROSOL, METERED RESPIRATORY (INHALATION)
Status: CANCELLED
Start: 2019-07-19

## 2019-06-24 RX ORDER — ALBUTEROL SULFATE 90 UG/1
1-2 AEROSOL, METERED RESPIRATORY (INHALATION)
Status: CANCELLED
Start: 2019-06-25

## 2019-06-24 RX ORDER — METHYLPREDNISOLONE SODIUM SUCCINATE 125 MG/2ML
125 INJECTION, POWDER, LYOPHILIZED, FOR SOLUTION INTRAMUSCULAR; INTRAVENOUS
Status: CANCELLED
Start: 2019-07-26

## 2019-06-24 RX ORDER — MEPERIDINE HYDROCHLORIDE 25 MG/ML
25 INJECTION INTRAMUSCULAR; INTRAVENOUS; SUBCUTANEOUS EVERY 30 MIN PRN
Status: CANCELLED | OUTPATIENT
Start: 2019-07-19

## 2019-06-24 RX ORDER — LORAZEPAM 2 MG/ML
0.5 INJECTION INTRAMUSCULAR EVERY 4 HOURS PRN
Status: CANCELLED
Start: 2019-07-05

## 2019-06-24 RX ORDER — ALBUTEROL SULFATE 0.83 MG/ML
2.5 SOLUTION RESPIRATORY (INHALATION)
Status: CANCELLED | OUTPATIENT
Start: 2019-07-02

## 2019-06-24 RX ORDER — SODIUM CHLORIDE 9 MG/ML
1000 INJECTION, SOLUTION INTRAVENOUS CONTINUOUS PRN
Status: CANCELLED
Start: 2019-07-26

## 2019-06-24 RX ORDER — NALOXONE HYDROCHLORIDE 0.4 MG/ML
.1-.4 INJECTION, SOLUTION INTRAMUSCULAR; INTRAVENOUS; SUBCUTANEOUS
Status: CANCELLED | OUTPATIENT
Start: 2019-07-26

## 2019-06-24 RX ORDER — EPINEPHRINE 0.3 MG/.3ML
0.3 INJECTION SUBCUTANEOUS EVERY 5 MIN PRN
Status: CANCELLED | OUTPATIENT
Start: 2019-07-19

## 2019-06-24 RX ORDER — DIPHENHYDRAMINE HYDROCHLORIDE 50 MG/ML
50 INJECTION INTRAMUSCULAR; INTRAVENOUS
Status: CANCELLED
Start: 2019-06-25

## 2019-06-24 RX ORDER — EPINEPHRINE 1 MG/ML
0.3 INJECTION, SOLUTION, CONCENTRATE INTRAVENOUS EVERY 5 MIN PRN
Status: CANCELLED | OUTPATIENT
Start: 2019-07-05

## 2019-06-24 RX ORDER — METHYLPREDNISOLONE SODIUM SUCCINATE 125 MG/2ML
125 INJECTION, POWDER, LYOPHILIZED, FOR SOLUTION INTRAMUSCULAR; INTRAVENOUS
Status: CANCELLED
Start: 2019-07-23

## 2019-06-24 RX ORDER — LORAZEPAM 2 MG/ML
0.5 INJECTION INTRAMUSCULAR EVERY 4 HOURS PRN
Status: CANCELLED
Start: 2019-07-02

## 2019-06-24 RX ORDER — LORAZEPAM 2 MG/ML
0.5 INJECTION INTRAMUSCULAR EVERY 4 HOURS PRN
Status: CANCELLED
Start: 2019-07-16

## 2019-06-24 RX ORDER — EPINEPHRINE 1 MG/ML
0.3 INJECTION, SOLUTION, CONCENTRATE INTRAVENOUS EVERY 5 MIN PRN
Status: CANCELLED | OUTPATIENT
Start: 2019-07-19

## 2019-06-24 RX ORDER — LENALIDOMIDE 25 MG/1
25 CAPSULE ORAL DAILY
Qty: 14 CAPSULE | Refills: 0 | Status: SHIPPED | OUTPATIENT
Start: 2019-06-24 | End: 2019-07-26

## 2019-06-24 RX ORDER — ALBUTEROL SULFATE 90 UG/1
1-2 AEROSOL, METERED RESPIRATORY (INHALATION)
Status: CANCELLED
Start: 2019-07-26

## 2019-06-24 RX ORDER — ALBUTEROL SULFATE 0.83 MG/ML
2.5 SOLUTION RESPIRATORY (INHALATION)
Status: CANCELLED | OUTPATIENT
Start: 2019-06-25

## 2019-06-24 RX ORDER — ALBUTEROL SULFATE 0.83 MG/ML
2.5 SOLUTION RESPIRATORY (INHALATION)
Status: CANCELLED | OUTPATIENT
Start: 2019-07-19

## 2019-06-24 RX ORDER — NALOXONE HYDROCHLORIDE 0.4 MG/ML
.1-.4 INJECTION, SOLUTION INTRAMUSCULAR; INTRAVENOUS; SUBCUTANEOUS
Status: CANCELLED | OUTPATIENT
Start: 2019-07-19

## 2019-06-24 RX ORDER — LORAZEPAM 2 MG/ML
0.5 INJECTION INTRAMUSCULAR EVERY 4 HOURS PRN
Status: CANCELLED
Start: 2019-06-25

## 2019-06-24 RX ORDER — EPINEPHRINE 1 MG/ML
0.3 INJECTION, SOLUTION, CONCENTRATE INTRAVENOUS EVERY 5 MIN PRN
Status: CANCELLED | OUTPATIENT
Start: 2019-07-26

## 2019-06-24 RX ORDER — MEPERIDINE HYDROCHLORIDE 25 MG/ML
25 INJECTION INTRAMUSCULAR; INTRAVENOUS; SUBCUTANEOUS EVERY 30 MIN PRN
Status: CANCELLED | OUTPATIENT
Start: 2019-07-16

## 2019-06-24 RX ORDER — SODIUM CHLORIDE 9 MG/ML
1000 INJECTION, SOLUTION INTRAVENOUS CONTINUOUS PRN
Status: CANCELLED
Start: 2019-06-25

## 2019-06-24 RX ORDER — MEPERIDINE HYDROCHLORIDE 25 MG/ML
25 INJECTION INTRAMUSCULAR; INTRAVENOUS; SUBCUTANEOUS EVERY 30 MIN PRN
Status: CANCELLED | OUTPATIENT
Start: 2019-07-26

## 2019-06-24 RX ORDER — ALBUTEROL SULFATE 0.83 MG/ML
2.5 SOLUTION RESPIRATORY (INHALATION)
Status: CANCELLED | OUTPATIENT
Start: 2019-06-28

## 2019-06-24 ASSESSMENT — PAIN SCALES - GENERAL: PAINLEVEL: NO PAIN (0)

## 2019-06-24 ASSESSMENT — PATIENT HEALTH QUESTIONNAIRE - PHQ9: SUM OF ALL RESPONSES TO PHQ QUESTIONS 1-9: 2

## 2019-06-24 ASSESSMENT — MIFFLIN-ST. JEOR: SCORE: 1203.09

## 2019-06-24 ASSESSMENT — ACTIVITIES OF DAILY LIVING (ADL): DEPENDENT_IADLS:: INDEPENDENT

## 2019-06-24 NOTE — NURSING NOTE
"Chief Complaint   Patient presents with     RECHECK     Follow up bone marrow biopsy        Initial /80   Pulse 95   Temp 98.1  F (36.7  C) (Tympanic)   Resp 18   Ht 1.6 m (5' 3\")   Wt 71.4 kg (157 lb 6.4 oz)   SpO2 96%   BMI 27.88 kg/m   Estimated body mass index is 27.88 kg/m  as calculated from the following:    Height as of this encounter: 1.6 m (5' 3\").    Weight as of this encounter: 71.4 kg (157 lb 6.4 oz).  Medication Reconciliation: complete.  Immunizations and advance directives status reviewed. Pain scale 0 , PHQ-9 = 2.      Vilma Chi LPN                  "

## 2019-06-24 NOTE — PROGRESS NOTES
Face to Face Oncology Visit      The list of resources in the New Patient Folder, represent the items given to the patient to assist the patient during their cancer journey.  During the face to face visit, with the Oncology RN Care Coordination, the patient was given an explanation of the resources.      Updated that this material is in folder. She will look at it and states that she has one at home already for her spouse. Did discuss [facility] as she will be driving here frequently.      New Patient Folder -  o Social Work versus Medical Care Coordination Information - discussion  o Listing of Area Psychologist/Counselors  o Honoring Choices - Your Rights Information on advanced health care directives   o Welcome to [facility]   o ACS card  o Palliative Care for Those with Advanced Illness  o [facility] Grid of Services  o [facility]   o Nutrition Eating before, during and after chemotherapy (ACS)  o Aromatherapy   o Understanding Vaccinations and Cancer  o [facility]  o Life After Treatment - The Next Chapter in Your Survivorship Journey (ACS)  o How to Be a Friend to Someone with Cancer (ACS)  o Being a Caregiver (ACS)  o Understanding Clinical Trials  o [facility]      ONCOLOGY DEPARTMENT CONTACT INFORMATION    [facility] Plan -need to create and give to patient.     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      Nutritional Concerns -discussed that diet will be important during treatment      Pharmacy  o Pharmacy review of all medications prior to treatment discussed  o Chemo Education purpose discussed -scheduled today and was done for Revilimid and Velcade.      Patient is interested in POLST and will update  pt that it can be addressed at next appt with a provider.    Akilah Garcia RN   Oncology Care Coordinatior            Rev 4/9/19/MN

## 2019-06-24 NOTE — PATIENT INSTRUCTIONS
Start Velcade treatment tomorrow, as per calendar.    Please call [phone] with any questions.    Thank you.

## 2019-06-24 NOTE — PROGRESS NOTES
"Chemotherapy Education    Patient is a 67 y/o female here today for chemotherapy education, accompanied by self.  Pt has a cancer diagnosis of multiple myeloma and their main concern is chemo medications.  Their Oncologist is Dr Walker, and PCP is Kenyatta Trujillo.  Reviewed the following with the patient and their support person:  General chemotherapy information, including ways it is excreted from the body and cleaning and containment of vomitus or other bodily fluid, use of the bathroom, sexual health and intimacy, what to do if needing to miss a treatment, when to call a provider and the need for staff to wear protective equipment.  Importance of Central line care (port) or IV site care.  Proper use of the take home infusion pump, troubleshooting, and who to call if there is a malfunction.  Treatment regimen; velcade/revlimid and rationale for strict adherence, specific medication names including pre-treatment medications and at home scheduled or as needed medications, delivery methods, and side effects and management; including skin changes/hand-foot syndrome, anemia, neutropenia, thrombocytopenia, diarrhea/constipation, hair loss syndrome, memory changes/ \"chemobrain\", mouth sores, taste changes, neuropathy, fatigue, myelosuppression, and risk of extravasation or infiltration.  Infection prevention, and monitoring of lab values, what lab tests and what changes of these values meant, along with the possibility of hydration or blood product transfusion, or the need to defer or hold treatment.    General orientation to the Medical Oncology department, Infusion Services department, Huc/scheduling, bathrooms and usual flow of the treatment day provided as well as introduction to the Infusion nurses.  Patient received written information including \"Guide to Cancer Services\" folder, specific drug information guides, approved Internet sources, available community resources, and side effect specific management " pamphlets.  Business card with contact information given as well as Patient Navigator contact information.  No barriers to learning identified. Patient and family verbalized understanding of all written and verbal information. All questions answered to patients satisfaction.      Other concerns:  Pt instructed to call with further questions or concerns.  Patient states understanding and is in agreement with this plan.  Copy of appointments, and after visit summary (AVS) given to patient. Patient discharged ambulatory.

## 2019-06-25 ENCOUNTER — INFUSION THERAPY VISIT (OUTPATIENT)
Dept: INFUSION THERAPY | Facility: OTHER | Age: 69
End: 2019-06-25
Attending: INTERNAL MEDICINE
Payer: MEDICARE

## 2019-06-25 ENCOUNTER — PATIENT OUTREACH (OUTPATIENT)
Dept: ONCOLOGY | Facility: OTHER | Age: 69
End: 2019-06-25

## 2019-06-25 VITALS
HEIGHT: 63 IN | TEMPERATURE: 98.6 F | SYSTOLIC BLOOD PRESSURE: 113 MMHG | RESPIRATION RATE: 16 BRPM | DIASTOLIC BLOOD PRESSURE: 73 MMHG | BODY MASS INDEX: 28.1 KG/M2 | HEART RATE: 88 BPM | WEIGHT: 158.6 LBS | OXYGEN SATURATION: 95 %

## 2019-06-25 DIAGNOSIS — C90.00 MULTIPLE MYELOMA NOT HAVING ACHIEVED REMISSION (H): ICD-10-CM

## 2019-06-25 DIAGNOSIS — C90.00 MULTIPLE MYELOMA NOT HAVING ACHIEVED REMISSION (H): Primary | ICD-10-CM

## 2019-06-25 LAB
ALBUMIN SERPL-MCNC: 2.7 G/DL (ref 3.4–5)
ALP SERPL-CCNC: 106 U/L (ref 40–150)
ALT SERPL W P-5'-P-CCNC: 31 U/L (ref 0–50)
ANION GAP SERPL CALCULATED.3IONS-SCNC: 1 MMOL/L (ref 3–14)
AST SERPL W P-5'-P-CCNC: 26 U/L (ref 0–45)
BASOPHILS # BLD AUTO: 0 10E9/L (ref 0–0.2)
BASOPHILS NFR BLD AUTO: 0.2 %
BILIRUB SERPL-MCNC: 0.2 MG/DL (ref 0.2–1.3)
BUN SERPL-MCNC: 19 MG/DL (ref 7–30)
CALCIUM SERPL-MCNC: 8.5 MG/DL (ref 8.5–10.1)
CHLORIDE SERPL-SCNC: 97 MMOL/L (ref 94–109)
CO2 SERPL-SCNC: 28 MMOL/L (ref 20–32)
CREAT SERPL-MCNC: 0.69 MG/DL (ref 0.52–1.04)
DIFFERENTIAL METHOD BLD: ABNORMAL
EOSINOPHIL # BLD AUTO: 0.1 10E9/L (ref 0–0.7)
EOSINOPHIL NFR BLD AUTO: 1.4 %
ERYTHROCYTE [DISTWIDTH] IN BLOOD BY AUTOMATED COUNT: 15.8 % (ref 10–15)
FERRITIN SERPL-MCNC: 388 NG/ML (ref 8–252)
GFR SERPL CREATININE-BSD FRML MDRD: 88 ML/MIN/{1.73_M2}
GLUCOSE SERPL-MCNC: 95 MG/DL (ref 70–99)
HCT VFR BLD AUTO: 25.8 % (ref 35–47)
HGB BLD-MCNC: 8.8 G/DL (ref 11.7–15.7)
IMM GRANULOCYTES # BLD: 0.2 10E9/L (ref 0–0.4)
IMM GRANULOCYTES NFR BLD: 3.5 %
IRON SATN MFR SERPL: 41 % (ref 15–46)
IRON SERPL-MCNC: 85 UG/DL (ref 35–180)
LDH SERPL L TO P-CCNC: 131 U/L (ref 81–234)
LYMPHOCYTES # BLD AUTO: 1.8 10E9/L (ref 0.8–5.3)
LYMPHOCYTES NFR BLD AUTO: 35.4 %
MCH RBC QN AUTO: 31.2 PG (ref 26.5–33)
MCHC RBC AUTO-ENTMCNC: 34.1 G/DL (ref 31.5–36.5)
MCV RBC AUTO: 92 FL (ref 78–100)
MONOCYTES # BLD AUTO: 0.7 10E9/L (ref 0–1.3)
MONOCYTES NFR BLD AUTO: 13.3 %
NEUTROPHILS # BLD AUTO: 2.4 10E9/L (ref 1.6–8.3)
NEUTROPHILS NFR BLD AUTO: 46.2 %
NRBC # BLD AUTO: 0 10*3/UL
NRBC BLD AUTO-RTO: 0 /100
PLATELET # BLD AUTO: 239 10E9/L (ref 150–450)
POTASSIUM SERPL-SCNC: 3.7 MMOL/L (ref 3.4–5.3)
PROT SERPL-MCNC: 13.2 G/DL (ref 6.8–8.8)
RBC # BLD AUTO: 2.82 10E12/L (ref 3.8–5.2)
SODIUM SERPL-SCNC: 126 MMOL/L (ref 133–144)
TIBC SERPL-MCNC: 209 UG/DL (ref 240–430)
WBC # BLD AUTO: 5.1 10E9/L (ref 4–11)

## 2019-06-25 PROCEDURE — 82784 ASSAY IGA/IGD/IGG/IGM EACH: CPT | Mod: ZL | Performed by: INTERNAL MEDICINE

## 2019-06-25 PROCEDURE — 96401 CHEMO ANTI-NEOPL SQ/IM: CPT

## 2019-06-25 PROCEDURE — 80053 COMPREHEN METABOLIC PANEL: CPT | Mod: ZL | Performed by: INTERNAL MEDICINE

## 2019-06-25 PROCEDURE — 83615 LACTATE (LD) (LDH) ENZYME: CPT | Mod: ZL | Performed by: INTERNAL MEDICINE

## 2019-06-25 PROCEDURE — 82607 VITAMIN B-12: CPT | Mod: ZL | Performed by: INTERNAL MEDICINE

## 2019-06-25 PROCEDURE — 36415 COLL VENOUS BLD VENIPUNCTURE: CPT | Mod: ZL | Performed by: INTERNAL MEDICINE

## 2019-06-25 PROCEDURE — 82232 ASSAY OF BETA-2 PROTEIN: CPT | Mod: ZL | Performed by: INTERNAL MEDICINE

## 2019-06-25 PROCEDURE — 99000 SPECIMEN HANDLING OFFICE-LAB: CPT | Performed by: INTERNAL MEDICINE

## 2019-06-25 PROCEDURE — 86335 IMMUNFIX E-PHORSIS/URINE/CSF: CPT | Mod: ZL | Performed by: INTERNAL MEDICINE

## 2019-06-25 PROCEDURE — 00000402 ZZHCL STATISTIC TOTAL PROTEIN: Mod: ZL | Performed by: INTERNAL MEDICINE

## 2019-06-25 PROCEDURE — 85025 COMPLETE CBC W/AUTO DIFF WBC: CPT | Mod: ZL | Performed by: INTERNAL MEDICINE

## 2019-06-25 PROCEDURE — 84165 PROTEIN E-PHORESIS SERUM: CPT | Mod: ZL | Performed by: INTERNAL MEDICINE

## 2019-06-25 PROCEDURE — 25000128 H RX IP 250 OP 636: Mod: JW | Performed by: INTERNAL MEDICINE

## 2019-06-25 PROCEDURE — 85810 BLOOD VISCOSITY EXAMINATION: CPT | Mod: ZL | Performed by: INTERNAL MEDICINE

## 2019-06-25 PROCEDURE — 82728 ASSAY OF FERRITIN: CPT | Mod: ZL | Performed by: INTERNAL MEDICINE

## 2019-06-25 PROCEDURE — 82746 ASSAY OF FOLIC ACID SERUM: CPT | Mod: ZL | Performed by: INTERNAL MEDICINE

## 2019-06-25 PROCEDURE — 86334 IMMUNOFIX E-PHORESIS SERUM: CPT | Mod: ZL | Performed by: INTERNAL MEDICINE

## 2019-06-25 PROCEDURE — 83550 IRON BINDING TEST: CPT | Mod: ZL | Performed by: INTERNAL MEDICINE

## 2019-06-25 PROCEDURE — 83540 ASSAY OF IRON: CPT | Mod: ZL | Performed by: INTERNAL MEDICINE

## 2019-06-25 RX ADMIN — BORTEZOMIB 2.3 MG: 3.5 INJECTION, POWDER, LYOPHILIZED, FOR SOLUTION INTRAVENOUS; SUBCUTANEOUS at 14:53

## 2019-06-25 ASSESSMENT — PAIN SCALES - GENERAL: PAINLEVEL: NO PAIN (0)

## 2019-06-25 ASSESSMENT — MIFFLIN-ST. JEOR: SCORE: 1208.4

## 2019-06-25 NOTE — PROGRESS NOTES
Visit Date:   06/24/2019      HEMATOLOGY-ONCOLOGY CLINIC NOTE      HISTORY OF PRESENT ILLNESS:  Mrs. Watts returns for followup of monoclonal gammopathy and now newly diagnosed IgG multiple myeloma.  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  She had presented to the emergency room with vertigo and dizziness associated with fatigue.  CT head was negative.  She also had a stress test that was negative.  She was seen by Kenyatta Trujillo who ordered chemistries on her and she was found to have an elevated total protein of 12.9.  Further workup included a serum protein electrophoresis which revealed a monoclonal spike of 6.2 with a large monoclonal protein seen in the gamma fraction.  She also had a urine for immunofixation which revealed possible small protein band in the gamma fraction.  When we saw the patient she did complain of chronic fatigue.  She was under a lot of stress related to her 's diagnosis of cancer.  She was occasionally dyspnea on exertion.  She also had vertigo which was under control with meclizine.  When we saw the patient, given the fact she had a monoclonal spike of 6.2 we wanted to rule out myeloma by obtaining a bone marrow aspiration biopsy as well as skeletal metastatic bone for a bone survey and further lab work.  Further lab work included a CBC which revealed a white count of 5.7, hemoglobin 9.3, hematocrit 27.7, platelet count was 223.  Serum for immunofixation revealed an M-spike of 7.3 with monoclonal IgG immunoglobulin of kappa light chain type.  Serum viscosity was elevated at 2.9.  Quantitative immunoglobulins revealed an elevated IgG of 8160.  BUN was 21, creatinine 0.8, total protein was 13.7.  Beta-2 microglobulin was 5.8.  Bone marrow aspiration biopsy revealed that there was a plasma cell myeloma with approximately 80% plasma cells.  Immunofixation showed IgG kappa.  Flow cytometry  revealed kappa monotypic plasma cells consistent with clonal plasma cell neoplasm.  FISH panel was pending.  The patient comes in today.  She is now diagnosed with IgG kappa multiple myeloma.  She did have elevated kappa free light chains with ratio elevated at 17.  In terms of her risk profile, she has at least stage II disease based on beta-2 microglobulin and anemia.  We cannot adequately assess her risk profiles.  Her FISH panel is pending, but nonetheless she is a candidate for treatment given that she has active multiple myeloma.  Currently she says she has chronic fatigue.  She does note some occasional blurred vision.  Denies any fevers or night sweats or significant weight loss.      PHYSICAL EXAMINATION:   GENERAL:  She is an elderly white female in no acute distress.   VITAL SIGNS:  Blood pressure 120/80, pulse 95, respirations 18, temperature 98.1.   HEENT:  Atraumatic, normocephalic.  Oropharynx nonerythematous.   NECK:  Supple.   LUNGS:  Clear to auscultation and percussion.   HEART:  Regular rhythm, S1, S2 normal.   ABDOMEN:  Soft, normoactive bowel sounds.  No mass, nontender.   LYMPHATICS:  No cervical, supraclavicular, axillary or inguinal nodes.   EXTREMITIES:  No edema.   NEUROLOGIC:  Nonfocal.      LABORATORY DATA:  As above.      IMPRESSION:  IgG kappa multiple myeloma with 80% involvement of the bone marrow consistent with a diagnosis of multiple myeloma.  The patient also with evidence of end-organ damage with anemia, elevated serum viscosity, possible lytic lesion at L3 on bone survey, elevated beta-2 microglobulin, elevated kappa lambda ratio.  The patient at least has standard risk genetics and would be a candidate for Velcade, Revlimid, dexamethasone as an induction regimen.  Although she may be a transplant candidate, she is not interested in a transplant at this point.  We would like to start her as soon as possible in terms of therapy.  We will start Velcade 1.3 mg/m2 to be given on  days 1, 4, 8 and 11 with Decadron 40 mg on days 1, 8 and 15.  We will add Revlimid with the second cycle, which is 25 mg p.o. daily on days 1 through 14.  We will also obtain an MRI of lumbar spine to confirm or rule out lytic lesion at L3.  If there is a lytic lesion, the patient will be a candidate for Zometa in addition, otherwise we will proceed with the above treatment.  We will start tomorrow with day 1 of Velcade.  We discussed side effects including neuropathy, cytopenias, possible nausea.  The patient is interested.  We also start Zovirax 400 mg p.o. b.i.d. as prophylaxis.  The patient is in agreement and wants to proceed.  We will also obtain myeloma profile with day 1 of each cycle in addition to LDH.      Forty minutes was spent with the patient, greater than half the time spent in counseling and coordination of care.         DORIE GUNN MD             D: 2019   T: 2019   MT: KATHLEEN      Name:     WILLIAM WILDER   MRN:      [id number]        Account:      [id number]   :      1950           Visit Date:   2019      Document: [id number]       cc: Kenyatta RIDDLE

## 2019-06-25 NOTE — PATIENT INSTRUCTIONS
Patient Education     Velcade  Generic Name: Bortezomib  Drug type  Velcade stops the growth of certain cancer cells.  What this drug is used for   Multiple myeloma, mantle cell lymphoma     How this drug is given  This drug is usually given by IV or as a shot under the skin (thigh or stomach). If you receive the medicine by IV, it may leak out of the vein. Your nurses are trained to catch this, but please let them know right away if you think the medicine is leaking. You would feel pain, discomfort, and heat. You might see redness on the skin around the IV.  The amount of medicine that you receive depends on many things. Your doctor will decide on the best dose for you.  Make sure your health care team knows about all the medicines and supplements you take. This will help prevent drug interactions.   Side effects  Most people do not have all the side effects listed. Side effects will usually go away after treatment is complete. Rare side effects are not listed here, so tell your doctor if you have any unusual symptoms.  Common side effects (occur in more than 3 out of 10 people):    Feeling tired and weak    Numbness and tingling in the hands and feet    Loose, watery stools (diarrhea) or hard, dry stools (constipation)    Feeling sick to your stomach (nausea)    Throwing up (vomiting)    Do not feel hungry    Fever    Low platelets (risk of bleeding)    Low red blood cells (anemia)  Less common side effects (occur in less than 3 out of 10 people):    Headache    Trouble sleeping    Joint or bone pain    Muscle cramps    Swelling in the body    Feel dizzy    Rash, Itching    Blurry vision    Cough    Heartburn    Low blood pressure  Call your doctor right away if you have:    Fever of 100.4 F (38 C) or higher and/or chills    Signs of severe allergy: trouble breathing, your throat closes up, swelling of the mouth, face, lips or tongue, or hives (red, itchy spots on the skin).  Call your doctor within 24 hours if  you have:    Nausea so bad that you can't eat or drink and medicine doesn't help    Loose, watery stools 4 to 6 times in 24 hours    Blisters on the skin where IV was inserted    You feel so tired that you can't care for yourself    Vomiting more than 4 to 5 times in 24 hours    Black or tarry stools or blood in your stools    Blood in urine (pee)    Unusual bruising or bleeding    Shortness of breath    Mouth pain    Swelling or redness in just one arm or leg    Swelling of the feet or ankles or sudden weight gain.    Numbness or tingling in the hands or feet  Other information:________________________  _____________________________________  _____________________________________  If you have any side effects, call us. We can help you cope with them.   For more information, see:  www.chemocare.com   www.nlm.nih.gov/medlineplus/druginformation.htm  www.cancer.gov/cancertopics/coping/chemotherapy-and-you  For informational purposes only. Not to replace the advice of your health care provider.   Developed in collaboration with [facility].  Copyright   2014 [employer]. All rights reserved. [employer] 535590 - REV 05/16.  For informational purposes only. Not to replace the advice of your health care provider.  Copyright   2018 [employer]. All rights reserved.

## 2019-06-25 NOTE — PROGRESS NOTES
Patient is a 68 year old female her today for injection of Velcade per order of . Patient meets parameters for today's infusion. Patient identified with two identifiers, order verified, and verbal consent for today's infusion obtained from patient. Written consent for treatment is on file and valid.    Today's lab values: WBC: 5.1, HGB: 8.8, PLT: 239  ANC: 2.4, Creatinine Clearance: 72.1mL/min. Patient meets order parameters for today's treatment.    Patient denies questions or concerns regarding injection and/or medication(s) being administered.    Independent dose check complete with Julia Anderson RN    Velcade injected SQ into right upper quadrant at 45 degree angle  per protocol rotating sites.     Injection administered per protocol. Patient tolerated infusion without incident, no signs or symptoms of adverse reaction noted. Patient denies pain or discomfort.     Covered with a sterile bandage. Pt instructed to leave bandage intact for a minimum of one hour, and to call with questions or concerns. Copy of appointments, discharge instructions, and after visit summary (AVS) provided to patient. Patient states understanding, discharged ambulatory.

## 2019-06-25 NOTE — PROGRESS NOTES
Clinic Care Coordination Contact  Care Team Conversations    Patient here for chemotherapy. Gave Emergency Care plan and encouraged to call with any questions. Gave booklets on Velcade and multiple myeloma. She does want to have POLST complete. Updated her that I will place with chart prep for next appt with the provider to complete at that time. Encouraged to call with questions or concerns.    Akilah Garcia RN   Oncology Care Coordinatior

## 2019-06-26 LAB
FOLATE SERPL-MCNC: 20.3 NG/ML
VIT B12 SERPL-MCNC: 516 PG/ML (ref 193–986)

## 2019-06-27 DIAGNOSIS — C90.00 MULTIPLE MYELOMA NOT HAVING ACHIEVED REMISSION (H): Primary | ICD-10-CM

## 2019-06-27 LAB
ALBUMIN SERPL ELPH-MCNC: 3.8 G/DL (ref 3.7–5.1)
ALPHA1 GLOB SERPL ELPH-MCNC: 0.3 G/DL (ref 0.2–0.4)
ALPHA2 GLOB SERPL ELPH-MCNC: 0.7 G/DL (ref 0.5–0.9)
B-GLOBULIN SERPL ELPH-MCNC: 0.6 G/DL (ref 0.6–1)
B2 MICROGLOB SERPL-MCNC: 5.4 MG/L
GAMMA GLOB SERPL ELPH-MCNC: 7.1 G/DL (ref 0.7–1.6)
IGA SERPL-MCNC: 12 MG/DL (ref 70–380)
IGG SERPL-MCNC: 8140 MG/DL (ref 695–1620)
IGM SERPL-MCNC: 7 MG/DL (ref 60–265)
M PROTEIN SERPL ELPH-MCNC: 6.9 G/DL
PROT ELPH PNL UR ELPH: NORMAL
PROT PATTERN SERPL ELPH-IMP: ABNORMAL
PROT PATTERN SERPL IFE-IMP: ABNORMAL
VISC SER: 2.9 CP (ref 1.4–1.8)

## 2019-06-28 ENCOUNTER — INFUSION THERAPY VISIT (OUTPATIENT)
Dept: INFUSION THERAPY | Facility: OTHER | Age: 69
End: 2019-06-28
Attending: INTERNAL MEDICINE
Payer: MEDICARE

## 2019-06-28 VITALS
BODY MASS INDEX: 28.24 KG/M2 | RESPIRATION RATE: 16 BRPM | OXYGEN SATURATION: 95 % | HEART RATE: 80 BPM | TEMPERATURE: 98.2 F | HEIGHT: 63 IN | WEIGHT: 159.4 LBS | DIASTOLIC BLOOD PRESSURE: 80 MMHG | SYSTOLIC BLOOD PRESSURE: 130 MMHG

## 2019-06-28 DIAGNOSIS — C90.00 MULTIPLE MYELOMA NOT HAVING ACHIEVED REMISSION (H): Primary | ICD-10-CM

## 2019-06-28 PROCEDURE — 96401 CHEMO ANTI-NEOPL SQ/IM: CPT

## 2019-06-28 PROCEDURE — 25000128 H RX IP 250 OP 636: Performed by: INTERNAL MEDICINE

## 2019-06-28 RX ADMIN — BORTEZOMIB 2.3 MG: 3.5 INJECTION, POWDER, LYOPHILIZED, FOR SOLUTION INTRAVENOUS; SUBCUTANEOUS at 14:32

## 2019-06-28 ASSESSMENT — MIFFLIN-ST. JEOR: SCORE: 1212.16

## 2019-06-28 ASSESSMENT — PAIN SCALES - GENERAL: PAINLEVEL: MILD PAIN (2)

## 2019-06-28 NOTE — PROGRESS NOTES
Patient is a 68 here today accompanied by self for injection of Velcade per order of Dr. Walker.  Patient denies questions nor concerns regarding medication, administration site, side effects, nor aftercare.  Patient identified with two identifiers, order verified, and verbal consent for today's injection obtained from patient.     Patient education provided on s/s of injection site infection, and/or medication specific side effects, and when to call a provider.  Patient instructed to report any adverse effects.   Medication administered per protocol in left upper quadrant (abd) at 45 degree angle.  Site covered with sterile bandage.  Patient tolerated injection well, no verbal nor non-verbal signs of discomfort noted.  No adverse effects noted at this time.   Patient instructed to call with further questions or concerns.  Patient states understanding and is in agreement with this plan.  Copy of appointments, and after visit summary (AVS) given to patient. Patient discharged ambulatory.

## 2019-07-01 ENCOUNTER — TELEPHONE (OUTPATIENT)
Dept: ONCOLOGY | Facility: OTHER | Age: 69
End: 2019-07-01

## 2019-07-01 NOTE — TELEPHONE ENCOUNTER
----- Message from Elías Walker MD sent at 6/30/2019  1:56 PM CDT -----  Regarding: RE: Revlimid  JuliaCarmelita james is not interested in a BMT. We don't have to  Make her sick with Revlimid or Cytoxan . Velcade/Dex has an excellentresponse rate. Let's just do Velcade/ Dex alone Thanks, Dr. VIRAMONTES.  ----- Message -----  From: Julia Anderson, RN  Sent: 6/28/2019   3:10 PM  To: Elías Walker MD  Subject: Revlimid                                         Hi Dr. VIRAMONTES,    Just wanted to let you know that Carmelita Watts came in today. She heard from the specialty pharmacy and her co-pay for the Revlimid is almost $2,400 per month. She does not qualify for the Patient Assistance Foundation and there are no other options for co-pay assistance. I was looking at the article in the treatment plan it had mentioned Revlimid OR Cyclophosphamide. She would be 500mg/m2 which worked out to be 895mg. Per [facility] it would be significantly cheaper for her to do the Cyclophosphamide. Approx. $24 per pill and that was run without her insurance. [facility] stated they would need the Rx before they could run through her insurance. But anyways, would it be possible to switch her to cyclophosphamide 500mg/m2. Not sure what days she would take it or whatnot. But I just wanted to check with you to see if we could do this. Carmelita would prefer to go with the cheaper one if it would work as well as the Revlimid., but if it won't work as well, she would just deal with the co-pay.    I can talk to you more about it on Monday, but wanted to give you a heads up.    Thanks!!  Julia

## 2019-07-01 NOTE — TELEPHONE ENCOUNTER
Call placed to patient to inform her of Dr. Walker's recommendations. Pt verbalized understanding and agrees with this plan.     Call placed to [facility] to inform them the Rx for Revlimid is no longer needed at this time. Per [facility], they will cancel the Rx for now.

## 2019-07-02 ENCOUNTER — APPOINTMENT (OUTPATIENT)
Dept: LAB | Facility: OTHER | Age: 69
End: 2019-07-02
Attending: INTERNAL MEDICINE
Payer: MEDICARE

## 2019-07-02 ENCOUNTER — INFUSION THERAPY VISIT (OUTPATIENT)
Dept: INFUSION THERAPY | Facility: OTHER | Age: 69
End: 2019-07-02
Attending: INTERNAL MEDICINE
Payer: MEDICARE

## 2019-07-02 VITALS
RESPIRATION RATE: 16 BRPM | OXYGEN SATURATION: 97 % | WEIGHT: 157.8 LBS | TEMPERATURE: 98.7 F | SYSTOLIC BLOOD PRESSURE: 126 MMHG | BODY MASS INDEX: 27.96 KG/M2 | DIASTOLIC BLOOD PRESSURE: 80 MMHG | HEART RATE: 78 BPM | HEIGHT: 63 IN

## 2019-07-02 DIAGNOSIS — C90.00 MULTIPLE MYELOMA NOT HAVING ACHIEVED REMISSION (H): Primary | ICD-10-CM

## 2019-07-02 LAB
BASOPHILS # BLD AUTO: 0.3 10E9/L (ref 0–0.2)
BASOPHILS NFR BLD AUTO: 5 %
DIFFERENTIAL METHOD BLD: ABNORMAL
EOSINOPHIL # BLD AUTO: 0 10E9/L (ref 0–0.7)
EOSINOPHIL NFR BLD AUTO: 0 %
ERYTHROCYTE [DISTWIDTH] IN BLOOD BY AUTOMATED COUNT: 15.9 % (ref 10–15)
HCT VFR BLD AUTO: 25.9 % (ref 35–47)
HGB BLD-MCNC: 8.9 G/DL (ref 11.7–15.7)
LYMPHOCYTES # BLD AUTO: 2.1 10E9/L (ref 0.8–5.3)
LYMPHOCYTES NFR BLD AUTO: 37 %
MCH RBC QN AUTO: 31.1 PG (ref 26.5–33)
MCHC RBC AUTO-ENTMCNC: 34.4 G/DL (ref 31.5–36.5)
MCV RBC AUTO: 91 FL (ref 78–100)
METAMYELOCYTES # BLD: 0.1 10E9/L
METAMYELOCYTES NFR BLD MANUAL: 1 %
MONOCYTES # BLD AUTO: 0.2 10E9/L (ref 0–1.3)
MONOCYTES NFR BLD AUTO: 4 %
NEUTROPHILS # BLD AUTO: 2.9 10E9/L (ref 1.6–8.3)
NEUTROPHILS NFR BLD AUTO: 50 %
NEUTS BAND # BLD AUTO: 0.2 10E9/L (ref 0–0.6)
NEUTS BAND NFR BLD MANUAL: 3 %
PLATELET # BLD AUTO: 204 10E9/L (ref 150–450)
PLATELET # BLD EST: ABNORMAL 10*3/UL
RBC # BLD AUTO: 2.86 10E12/L (ref 3.8–5.2)
RBC MORPH BLD: ABNORMAL
WBC # BLD AUTO: 5.8 10E9/L (ref 4–11)

## 2019-07-02 PROCEDURE — 36415 COLL VENOUS BLD VENIPUNCTURE: CPT | Mod: ZL | Performed by: INTERNAL MEDICINE

## 2019-07-02 PROCEDURE — 85025 COMPLETE CBC W/AUTO DIFF WBC: CPT | Mod: ZL | Performed by: INTERNAL MEDICINE

## 2019-07-02 PROCEDURE — 25000128 H RX IP 250 OP 636: Performed by: INTERNAL MEDICINE

## 2019-07-02 PROCEDURE — 96401 CHEMO ANTI-NEOPL SQ/IM: CPT

## 2019-07-02 RX ADMIN — BORTEZOMIB 2.3 MG: 3.5 INJECTION, POWDER, LYOPHILIZED, FOR SOLUTION INTRAVENOUS; SUBCUTANEOUS at 15:13

## 2019-07-02 ASSESSMENT — MIFFLIN-ST. JEOR: SCORE: 1204.78

## 2019-07-02 NOTE — PROGRESS NOTES
Patient is a 67 y/o female her today for injection of Velcade per order of . Patient meets parameters for today's infusion. Patient identified with two identifiers, order verified, and verbal consent for today's infusion obtained from patient. Written consent for treatment is on file and valid.    Recent lab values: WBC: 5.8, HGB: 8.9, PLT: 204  ANC: 2.9. Patient meets order parameters for today's treatment.  Patient denies questions or concerns regarding injection and/or medication(s) being administered.  Velcade injected SQ into right upper arm at  45 degrees  per protocol rotating sites.     Injection administered per protocol. Patient tolerated infusion without incident, no signs or symptoms of adverse reaction noted. Patient denies pain or discomfort.   Instructed to observe injection sight to left abdomen if worsens with increase in redness or warmth she should go in to have it evaluated.  Covered with a sterile bandage. Pt instructed to leave bandage intact for a minimum of one hour, and to call with questions or concerns. Copy of appointments, discharge instructions, and after visit summary (AVS) provided to patient. Patient states understanding, discharged ambulatory.

## 2019-07-02 NOTE — PATIENT INSTRUCTIONS
Patient Education     Velcade  Generic Name: Bortezomib  Drug type  Velcade stops the growth of certain cancer cells.  What this drug is used for   Multiple myeloma, mantle cell lymphoma   or _______________________________________.   How this drug is given  This drug is usually given by IV or as a shot under the skin (thigh or stomach). If you receive the medicine by IV, it may leak out of the vein. Your nurses are trained to catch this, but please let them know right away if you think the medicine is leaking. You would feel pain, discomfort, and heat. You might see redness on the skin around the IV.  The amount of medicine that you receive depends on many things. Your doctor will decide on the best dose for you.  Make sure your health care team knows about all the medicines and supplements you take. This will help prevent drug interactions.   Side effects  Most people do not have all the side effects listed. Side effects will usually go away after treatment is complete. Rare side effects are not listed here, so tell your doctor if you have any unusual symptoms.  Common side effects (occur in more than 3 out of 10 people):    Feeling tired and weak    Numbness and tingling in the hands and feet    Loose, watery stools (diarrhea) or hard, dry stools (constipation)    Feeling sick to your stomach (nausea)    Throwing up (vomiting)    Do not feel hungry    Fever    Low platelets (risk of bleeding)    Low red blood cells (anemia)  Less common side effects (occur in less than 3 out of 10 people):    Headache    Trouble sleeping    Joint or bone pain    Muscle cramps    Swelling in the body    Feel dizzy    Rash, Itching    Blurry vision    Cough    Heartburn    Low blood pressure  Call your doctor right away if you have:    Fever of 100.4 F (38 C) or higher and/or chills    Signs of severe allergy: trouble breathing, your throat closes up, swelling of the mouth, face, lips or tongue, or hives (red, itchy spots on the  skin).  Call your doctor within 24 hours if you have:    Nausea so bad that you can't eat or drink and medicine doesn't help    Loose, watery stools 4 to 6 times in 24 hours    Blisters on the skin where IV was inserted    You feel so tired that you can't care for yourself    Vomiting more than 4 to 5 times in 24 hours    Black or tarry stools or blood in your stools    Blood in urine (pee)    Unusual bruising or bleeding    Shortness of breath    Mouth pain    Swelling or redness in just one arm or leg    Swelling of the feet or ankles or sudden weight gain.    Numbness or tingling in the hands or feet  Other information:________________________  _____________________________________  _____________________________________  If you have any side effects, call us. We can help you cope with them.   For more information, see:  www.[facility].com   www.nlm.nih.gov/medlineplus/druginformation.htm  www.cancer.gov/cancertopics/coping/chemotherapy-and-you  For informational purposes only. Not to replace the advice of your health care provider.   Developed in collaboration with [facility].  Copyright   2014 [employer]. All rights reserved. [employer] 478148 - REV 05/16.  For informational purposes only. Not to replace the advice of your health care provider.  Copyright   2018 [employer]. All rights reserved.

## 2019-07-05 ENCOUNTER — INFUSION THERAPY VISIT (OUTPATIENT)
Dept: INFUSION THERAPY | Facility: OTHER | Age: 69
End: 2019-07-05
Attending: INTERNAL MEDICINE
Payer: MEDICARE

## 2019-07-05 VITALS
WEIGHT: 154.9 LBS | HEART RATE: 86 BPM | HEIGHT: 63 IN | DIASTOLIC BLOOD PRESSURE: 81 MMHG | RESPIRATION RATE: 16 BRPM | TEMPERATURE: 98.5 F | OXYGEN SATURATION: 97 % | SYSTOLIC BLOOD PRESSURE: 134 MMHG | BODY MASS INDEX: 27.45 KG/M2

## 2019-07-05 DIAGNOSIS — C90.00 MULTIPLE MYELOMA NOT HAVING ACHIEVED REMISSION (H): Primary | ICD-10-CM

## 2019-07-05 PROCEDURE — 25000128 H RX IP 250 OP 636: Performed by: INTERNAL MEDICINE

## 2019-07-05 PROCEDURE — 96401 CHEMO ANTI-NEOPL SQ/IM: CPT

## 2019-07-05 RX ADMIN — BORTEZOMIB 2.3 MG: 3.5 INJECTION, POWDER, LYOPHILIZED, FOR SOLUTION INTRAVENOUS; SUBCUTANEOUS at 14:39

## 2019-07-05 ASSESSMENT — MIFFLIN-ST. JEOR: SCORE: 1191.62

## 2019-07-05 NOTE — PROGRESS NOTES
Patient is a 69 y/o female her today for injection of Velcade per order of . Patient meets parameters for today's infusion. Patient identified with two identifiers, order verified, and verbal consent for today's infusion obtained from patient. Written consent for treatment is on file and valid.    Recent lab values: WBC: 5.8, HGB: 8.9, PLT: 204  ANC: 2.9. Patient meets order parameters for today's treatment.  Patient denies questions or concerns regarding injection and/or medication(s) being administered.  1439 Velcade injected SQ into left upper arm 45 degrees  per protocol rotating sites.     Injection administered per protocol. Patient tolerated infusion without incident, no signs or symptoms of adverse reaction noted. Patient denies pain or discomfort.     Covered with a sterile bandage. Pt instructed to leave bandage intact for a minimum of one hour, and to call with questions or concerns. Copy of appointments, discharge instructions, and after visit summary (AVS) provided to patient. Patient states understanding, discharged ambulatory.

## 2019-07-05 NOTE — PROGRESS NOTES
[facility]    1. In the last 7 days, how OFTEN did you have nausea ? Rarely      In the last 7 days, what was the SEVERITY of your NAUSEA at its WORST? Mild     2. In the last 7 days, what was the SEVERITY of your CONSTIPATION at its WORST? None    3. In the last 7 days, how OFTEN did you have PAIN? Rarely      In the last 7 days, what was the SEVERITY of your PAIN at its WORST? Mild      In the last 7 days, how much did PAIN INTERFERE with your usual or daily activities? A little bit    Do you have any other symptoms that you wish to report ? No    Please list any other symptoms:        Note routed to Provider for review No

## 2019-07-11 ENCOUNTER — HOSPITAL ENCOUNTER (OUTPATIENT)
Dept: MRI IMAGING | Facility: HOSPITAL | Age: 69
Discharge: HOME | End: 2019-07-11
Attending: INTERNAL MEDICINE | Admitting: INTERNAL MEDICINE
Payer: MEDICARE

## 2019-07-11 DIAGNOSIS — C90.00 MULTIPLE MYELOMA NOT HAVING ACHIEVED REMISSION (H): ICD-10-CM

## 2019-07-11 LAB
ALBUMIN SERPL-MCNC: 2.8 G/DL (ref 3.4–5)
ALP SERPL-CCNC: 100 U/L (ref 40–150)
ALT SERPL W P-5'-P-CCNC: 59 U/L (ref 0–50)
ANION GAP SERPL CALCULATED.3IONS-SCNC: 2 MMOL/L (ref 3–14)
AST SERPL W P-5'-P-CCNC: 39 U/L (ref 0–45)
BASOPHILS # BLD AUTO: 0 10E9/L (ref 0–0.2)
BASOPHILS NFR BLD AUTO: 0.1 %
BILIRUB SERPL-MCNC: 0.3 MG/DL (ref 0.2–1.3)
BUN SERPL-MCNC: 24 MG/DL (ref 7–30)
CALCIUM SERPL-MCNC: 8.6 MG/DL (ref 8.5–10.1)
CHLORIDE SERPL-SCNC: 103 MMOL/L (ref 94–109)
CO2 SERPL-SCNC: 27 MMOL/L (ref 20–32)
COPATH REPORT: NORMAL
CREAT SERPL-MCNC: 0.91 MG/DL (ref 0.52–1.04)
DIFFERENTIAL METHOD BLD: ABNORMAL
EOSINOPHIL # BLD AUTO: 0 10E9/L (ref 0–0.7)
EOSINOPHIL NFR BLD AUTO: 0.4 %
ERYTHROCYTE [DISTWIDTH] IN BLOOD BY AUTOMATED COUNT: 15.7 % (ref 10–15)
FERRITIN SERPL-MCNC: 4518 NG/ML (ref 8–252)
GFR SERPL CREATININE-BSD FRML MDRD: 64 ML/MIN/{1.73_M2}
GLUCOSE SERPL-MCNC: 104 MG/DL (ref 70–99)
HCT VFR BLD AUTO: 26.8 % (ref 35–47)
HGB BLD-MCNC: 9.2 G/DL (ref 11.7–15.7)
IMM GRANULOCYTES # BLD: 0.1 10E9/L (ref 0–0.4)
IMM GRANULOCYTES NFR BLD: 1.3 %
IRON SATN MFR SERPL: 40 % (ref 15–46)
IRON SERPL-MCNC: 96 UG/DL (ref 35–180)
LDH SERPL L TO P-CCNC: 214 U/L (ref 81–234)
LYMPHOCYTES # BLD AUTO: 2.9 10E9/L (ref 0.8–5.3)
LYMPHOCYTES NFR BLD AUTO: 36.9 %
MCH RBC QN AUTO: 31.3 PG (ref 26.5–33)
MCHC RBC AUTO-ENTMCNC: 34.3 G/DL (ref 31.5–36.5)
MCV RBC AUTO: 91 FL (ref 78–100)
MONOCYTES # BLD AUTO: 1.2 10E9/L (ref 0–1.3)
MONOCYTES NFR BLD AUTO: 14.6 %
NEUTROPHILS # BLD AUTO: 3.7 10E9/L (ref 1.6–8.3)
NEUTROPHILS NFR BLD AUTO: 46.7 %
NRBC # BLD AUTO: 0 10*3/UL
NRBC BLD AUTO-RTO: 0 /100
PLATELET # BLD AUTO: 141 10E9/L (ref 150–450)
POTASSIUM SERPL-SCNC: 3.4 MMOL/L (ref 3.4–5.3)
PROT SERPL-MCNC: 11 G/DL (ref 6.8–8.8)
RBC # BLD AUTO: 2.94 10E12/L (ref 3.8–5.2)
SODIUM SERPL-SCNC: 132 MMOL/L (ref 133–144)
TIBC SERPL-MCNC: 243 UG/DL (ref 240–430)
WBC # BLD AUTO: 7.9 10E9/L (ref 4–11)

## 2019-07-11 PROCEDURE — 86334 IMMUNOFIX E-PHORESIS SERUM: CPT | Mod: ZL | Performed by: INTERNAL MEDICINE

## 2019-07-11 PROCEDURE — 83615 LACTATE (LD) (LDH) ENZYME: CPT | Mod: ZL | Performed by: INTERNAL MEDICINE

## 2019-07-11 PROCEDURE — 82784 ASSAY IGA/IGD/IGG/IGM EACH: CPT | Mod: ZL | Performed by: INTERNAL MEDICINE

## 2019-07-11 PROCEDURE — 83550 IRON BINDING TEST: CPT | Mod: ZL | Performed by: INTERNAL MEDICINE

## 2019-07-11 PROCEDURE — 82728 ASSAY OF FERRITIN: CPT | Mod: ZL | Performed by: INTERNAL MEDICINE

## 2019-07-11 PROCEDURE — 84165 PROTEIN E-PHORESIS SERUM: CPT | Mod: ZL | Performed by: INTERNAL MEDICINE

## 2019-07-11 PROCEDURE — 00000402 ZZHCL STATISTIC TOTAL PROTEIN: Mod: ZL | Performed by: INTERNAL MEDICINE

## 2019-07-11 PROCEDURE — 85025 COMPLETE CBC W/AUTO DIFF WBC: CPT | Mod: ZL | Performed by: INTERNAL MEDICINE

## 2019-07-11 PROCEDURE — 82232 ASSAY OF BETA-2 PROTEIN: CPT | Mod: ZL | Performed by: INTERNAL MEDICINE

## 2019-07-11 PROCEDURE — A9585 GADOBUTROL INJECTION: HCPCS | Performed by: RADIOLOGY

## 2019-07-11 PROCEDURE — 86335 IMMUNFIX E-PHORSIS/URINE/CSF: CPT | Mod: ZL | Performed by: INTERNAL MEDICINE

## 2019-07-11 PROCEDURE — 80053 COMPREHEN METABOLIC PANEL: CPT | Mod: ZL | Performed by: INTERNAL MEDICINE

## 2019-07-11 PROCEDURE — 36415 COLL VENOUS BLD VENIPUNCTURE: CPT | Mod: ZL | Performed by: INTERNAL MEDICINE

## 2019-07-11 PROCEDURE — 99000 SPECIMEN HANDLING OFFICE-LAB: CPT | Performed by: INTERNAL MEDICINE

## 2019-07-11 PROCEDURE — 83540 ASSAY OF IRON: CPT | Mod: ZL | Performed by: INTERNAL MEDICINE

## 2019-07-11 PROCEDURE — 85810 BLOOD VISCOSITY EXAMINATION: CPT | Mod: ZL | Performed by: INTERNAL MEDICINE

## 2019-07-11 PROCEDURE — 83883 ASSAY NEPHELOMETRY NOT SPEC: CPT | Mod: ZL | Performed by: INTERNAL MEDICINE

## 2019-07-11 PROCEDURE — 25500064 ZZH RX 255 OP 636: Performed by: RADIOLOGY

## 2019-07-11 PROCEDURE — 72158 MRI LUMBAR SPINE W/O & W/DYE: CPT | Mod: TC

## 2019-07-11 RX ORDER — GADOBUTROL 604.72 MG/ML
7.5 INJECTION INTRAVENOUS ONCE
Status: COMPLETED | OUTPATIENT
Start: 2019-07-11 | End: 2019-07-11

## 2019-07-11 RX ADMIN — GADOBUTROL 7.5 ML: 604.72 INJECTION INTRAVENOUS at 15:24

## 2019-07-12 LAB — COPATH REPORT: NORMAL

## 2019-07-15 DIAGNOSIS — C90.00 MULTIPLE MYELOMA NOT HAVING ACHIEVED REMISSION (H): Primary | ICD-10-CM

## 2019-07-15 LAB
ALBUMIN SERPL ELPH-MCNC: 3.8 G/DL (ref 3.7–5.1)
ALPHA1 GLOB SERPL ELPH-MCNC: 0.3 G/DL (ref 0.2–0.4)
ALPHA2 GLOB SERPL ELPH-MCNC: 0.7 G/DL (ref 0.5–0.9)
B-GLOBULIN SERPL ELPH-MCNC: 0.6 G/DL (ref 0.6–1)
B2 MICROGLOB SERPL-MCNC: 3.9 MG/L
GAMMA GLOB SERPL ELPH-MCNC: 5.5 G/DL (ref 0.7–1.6)
IGA SERPL-MCNC: 10 MG/DL (ref 70–380)
IGG SERPL-MCNC: 6120 MG/DL (ref 695–1620)
IGM SERPL-MCNC: 6 MG/DL (ref 60–265)
KAPPA LC FREE SER-MCNC: 1.16 MG/DL (ref 0.33–1.94)
KAPPA LC FREE/LAMBDA FREE SER NEPH: ABNORMAL {RATIO} (ref 0.26–1.65)
LAMBDA LC FREE SERPL-MCNC: <0.16 MG/DL (ref 0.57–2.63)
M PROTEIN SERPL ELPH-MCNC: 5.3 G/DL
PROT ELPH PNL UR ELPH: NORMAL
PROT PATTERN SERPL ELPH-IMP: ABNORMAL
PROT PATTERN SERPL IFE-IMP: ABNORMAL
VISC SER: 2.3 CP (ref 1.4–1.8)

## 2019-07-15 NOTE — PROGRESS NOTES
Updated  on Ferritin of 4,518 from 7.11.19.New order to repeat lab. Will update NP-pt has appt tomorrow. Order signed.    Akilah Garcia RN   [facility]

## 2019-07-15 NOTE — PROGRESS NOTES
Oncology Follow-up Visit:  July 16, 2019    Reason for Visit:  Patient presents with:  RECHECK: elevated blood protein/[facility] referring     Nursing Note and documentation reviewed: yes    HPI: This is a 68-year-old female patient who presents to the oncology clinic today for evaluation prior to receiving cycle 2 chemotherapy for IgG multiple myeloma diagnosed June 2019.  She is currently undergoing treatment with Velcade, dexamethasone.      She presents to the clinic today stating she is feeling good.  She states her energy has actually improved.  She does get some loose stool just after chemotherapy but then this subsides.  Appetite has been good.  She denies any actual discomfort to the low back unless she sitting a certain way and she may feel it in her lower spine.  She continues to be very active.  She does admit to a lot of increased stress over the past few months in relation to her 's health.  Overall; however, again, she just feels she is doing well.  She does tell me today she would like to complete a POLST form to have in her home.  She expresses the desire to be a DNR/DNI.    Oncologic History:     12/31/2018   patient presented to the emergency room with vertigo and fatigue.  CT scan of the head was negative and subsequent stress test was negative.  5/3/2019  She was seen by her PCP who ordered lab work and noted a total protein of 12.9.  SPEP at that time showed an M spike of 6.2 with a large monoclonal protein seen in the gamma fraction.  Urine immunofixation showed a possible small protein band in the gamma fraction  5/31/2019  she was evaluated by Dr. Walker with Medical Oncology with plan to rule out myeloma and obtain bone marrow aspiration biopsy as well as a metastatic bone survey along with additional labwork  6/18/2019 she underwent bone marrow aspiration and biopsy  6/24/2019  She was seen again by Dr. Walker and CBC showed a hemoglobin of 9.3, M spike 7.3 with monoclonal IgG  immunoglobulin of kappa light chain type; serum viscosity was 2.9; quantitative immunoglobulins showed an IgG of 8160, beta-2 microglobulin was 5.8, BUN was 21 with creatinine is 0.8 and total protein was 13.7.  Quantitative kappa/lambda free light chains showed an elevated ratio of 17.0 bone marrow aspiration biopsy showed plasma cell myeloma with approximately 80% plasma cells.  Immunofixation showed IgG kappa and flow cytometry revealed kappa monotypic plasma cells consistent with clonal plasma cell neoplasm and FISH panel was pending at that time.  It was felt she had at least stage II disease based on her beta-2 microglobulin and anemia.  Plan was to treat with Velcade 1.3 mg per metered squared days 1, 4, 8 and 11/Decadron 40 mg on days 1, 8 and 15 initiation of Revlimid with the second cycle at 25 mg daily days 1 through 14.  Plan was to also obtain an MRI of the lumbar spine to rule out lytic lesion at L3.  She was initiated on Zovirax 400 mg p.o. twice daily.  6/25/2019  1st cycle of chemotherapy initiated  7/1/2019 note in chart regarding patient's large co-pay for the Revlimid and no plan at this point to initiate Revlimid and treat only with Velcade and Decadron per Dr. Walker  7/11/2019  MRI lumbar spine shows a pathologic superior endplate compression fracture at L3 without evidence of retropulsed fragment and innumerable enhancing lesions throughout the lumbar spine consistent with history of multiple myeloma.      Current Chemo Regime/TX: Velcade 1.3 mg per metered squared on days 1, 4, 8 and 11 with Decadron 40 mg on days 1, 8 and 15  Current Cycle: 2  # of completed cycles:  1    Previous treatment:  n/a    Past Medical History:   Diagnosis Date     Arthritis      Depressive disorder      Essential hypertension 10/1/2015     Major depressive disorder, recurrent episode, mild (H) 10/1/2015     Mixed hyperlipidemia 10/1/2015     Multiple myeloma not having achieved remission (H) 6/24/2019     Other  "specified disorders of bladder 07/09/2012    Irritable Bladder     Seasonal allergies 10/1/2015     Unspecified essential hypertension 03/19/2007     Unspecified sinusitis (chronic) 09/05/2007       Past Surgical History:   Procedure Laterality Date     APPENDECTOMY       BONE MARROW BIOPSY, BONE SPECIMEN, NEEDLE/TROCAR N/A 6/18/2019    Procedure: BONE MARROW BIOPSY;  Surgeon: Maciej Sanz MD;  Location: [facility]     CHOLECYSTECTOMY       COLONOSCOPY  07-    repeat 10 years     COLONOSCOPY N/A 12/30/2016    Procedure: COLONOSCOPY;  Surgeon: Bhaskar Franklin DO;  Location: [facility]     SINUS SURGERY       TUBAL STERILIZATION         Family History   Problem Relation Age of Onset     Breast Cancer Mother 66        Cause of Death     Parkinsonism Father         \"Possible\"     Coronary Artery Disease Father      Pacemaker Father      Thyroid Disease Daughter      Diabetes No family hx of      Hypertension No family hx of      Hyperlipidemia No family hx of      Cerebrovascular Disease No family hx of      Colon Cancer No family hx of      Prostate Cancer No family hx of      Genetic Disorder No family hx of      Asthma No family hx of      Anesthesia Reaction No family hx of        Social History     Socioeconomic History     Marital status:      Spouse name: Not on file     Number of children: Not on file     Years of education: Not on file     Highest education level: Not on file   Occupational History     Occupation: Financial     Comment:  - (FT)   Social Needs     Financial resource strain: Not on file     Food insecurity:     Worry: Not on file     Inability: Not on file     Transportation needs:     Medical: Not on file     Non-medical: Not on file   Tobacco Use     Smoking status: Never Smoker     Smokeless tobacco: Never Used     Tobacco comment: Tried to Quit (YES); QUIT in 1971; Passive Exposure (NO)   Substance and Sexual Activity     Alcohol use: Yes     Comment: RARELY     " Drug use: No     Sexual activity: Yes     Partners: Male     Birth control/protection: None   Lifestyle     Physical activity:     Days per week: Not on file     Minutes per session: Not on file     Stress: Not on file   Relationships     Social connections:     Talks on phone: Not on file     Gets together: Not on file     Attends Muslim service: Not on file     Active member of club or organization: Not on file     Attends meetings of clubs or organizations: Not on file     Relationship status: Not on file     Intimate partner violence:     Fear of current or ex partner: Not on file     Emotionally abused: Not on file     Physically abused: Not on file     Forced sexual activity: Not on file   Other Topics Concern      Service Not Asked     Blood Transfusions Not Asked     Caffeine Concern Yes     Comment: Coffee >6 cups daily     Occupational Exposure Not Asked     Hobby Hazards Not Asked     Sleep Concern Not Asked     Stress Concern Not Asked     Weight Concern Not Asked     Special Diet Not Asked     Back Care Not Asked     Exercise Not Asked     Bike Helmet Not Asked     Seat Belt Not Asked     Self-Exams Not Asked     Parent/sibling w/ CABG, MI or angioplasty before 65F 55M? No   Social History Narrative     Not on file       Current Outpatient Medications   Medication     acyclovir (ZOVIRAX) 400 MG tablet     atorvastatin (LIPITOR) 10 MG tablet     citalopram (CELEXA) 20 MG tablet     dexamethasone (DECADRON) 4 MG tablet     EPINEPHrine (EPIPEN) 0.3 MG/0.3ML injection     fluticasone (FLONASE) 50 MCG/ACT nasal spray     LORazepam (ATIVAN) 0.5 MG tablet     losartan (COZAAR) 50 MG tablet     prochlorperazine (COMPAZINE) 10 MG tablet     LENalidomide (REVLIMID) 25 MG CAPS capsule CHEMOTHERAPY     No current facility-administered medications for this visit.         Allergies   Allergen Reactions     Lisinopril Cough     Phenylephrine Hcl Other (See Comments)     **Entex  HEADACHE (SEVERE)      "Phenylpropanolamine Other (See Comments)     **Entex  HEADACHE (SEVERE)     Pseudoephedrine Tannate Other (See Comments)     **Entex  HEADACHE (SEVERE)     Levofloxacin Rash     **Levaquin     Moxifloxacin Hcl [Avelox] Rash       Review Of Systems:  Constitutional:    denies fever, weight changes, chills, and night sweats.  Eyes:    denies blurred or double vision  Ears/Nose/Throat:   denies ear pain, nose problems, difficulty swallowing  Respiratory:   denies shortness of breath, cough or hemoptysis  Skin:   denies rash, lesions  Cardiovascular:   denies chest pain, palpitations, edema  Gastrointestinal:   denies abdominal pain, bloating, nausea, vomiting, early satiety  Genitourinary:   denies difficulty with urination, blood in urine  Musculoskeletal:    denies new muscle pain, bone pain  Neurologic:   denies lightheadedness, headaches, numbness or tingling  Psychiatric:   denies anxiety, depression  Hematologic/Lymphatic/Immunologic:   denies easy bruising, easy bleeding, lumps or bumps noted  Endocrine:   Dry mouth      Fatigue (0=no fatigue; 10=worst fatigue imaginable): 2    ECOG Performance Status: 1    Physical Exam:  /66   Pulse 98   Temp 98.5  F (36.9  C) (Tympanic)   Ht 1.6 m (5' 3\")   Wt 70.4 kg (155 lb 3.2 oz)   SpO2 97%   BMI 27.49 kg/m      GENERAL APPEARANCE: Healthy, alert and in no acute distress.  HEENT: Normocephalic, Sclerae anicteric. Oropharynx without ulcers, lesions, or thrush.  NECK:   No asymmetry or masses, no thyromegaly.  LYMPHATICS: No palpable cervical, supraclavicular, axillary, or inguinal nodes   RESP: Lungs clear to auscultation bilaterally, respirations regular and easy  CARDIOVASCULAR: Regular rate and rhythm. Normal S1, S2; no murmur, gallop, or rub.  ABDOMEN: Soft, nontender. Bowel sounds auscultated all 4 quadrants. No palpable organomegaly or masses.  MUSCULOSKELETAL: Extremities without gross deformities noted. No edema of bilateral lower extremities.  NEURO: " Alert and oriented x 3.  Gait steady.  PSYCHIATRIC: Mentation and affect appear normal.  Mood appropriate.    Laboratory:    Results for orders placed or performed in visit on 07/16/19   Ferritin   Result Value Ref Range    Ferritin 1,310 (H) 8 - 252 ng/mL     Component Value Flag Ref Range Units Status Collected Lab   WBC 6.6   4.0 - 11.0 10e9/L Final 07/16/2019  1:05 PM [facility]   RBC Count 2.94  Low   3.8 - 5.2 10e12/L Final 07/16/2019  1:05 PM [facility]   Hemoglobin 9.1  Low   11.7 - 15.7 g/dL Final 07/16/2019  1:05 PM [facility]   Hematocrit 27.3  Low   35.0 - 47.0 % Final 07/16/2019  1:05 PM [facility]   MCV 93   78 - 100 fl Final 07/16/2019  1:05 PM [facility]   MCH 31.0   26.5 - 33.0 pg Final 07/16/2019  1:05 PM [facility]   MCHC 33.3   31.5 - 36.5 g/dL Final 07/16/2019  1:05 PM [facility]   RDW 15.9  High   10.0 - 15.0 % Final 07/16/2019  1:05 PM [facility]   Platelet Count 304   150 - 450 10e9/L Final 07/16/2019  1:05 PM [facility]   Diff Method     Final 07/16/2019  1:05 PM [facility]   Automated Method    % Neutrophils 49.2    % Final 07/16/2019  1:05 PM [facility]   % Lymphocytes 26.3    % Final 07/16/2019  1:05 PM [facility]   % Monocytes 20.7    % Final 07/16/2019  1:05 PM [facility]   % Eosinophils 1.2    % Final 07/16/2019  1:05 PM [facility]   % Basophils 0.5    % Final 07/16/2019  1:05 PM [facility]   % Immature Granulocytes 2.1    % Final 07/16/2019  1:05 PM [facility]   Nucleated RBCs 0   0 /100 Final 07/16/2019  1:05 PM [facility]   Absolute Neutrophil 3.2   1.6 - 8.3 10e9/L Final 07/16/2019  1:05 PM [facility]   Absolute Lymphocytes 1.7   0.8 - 5.3 10e9/L Final 07/16/2019  1:05 PM [facility]   Absolute Monocytes 1.4  High   0.0 - 1.3 10e9/L Final 07/16/2019  1:05 PM [facility]   Absolute Eosinophils 0.1   0.0 - 0.7 10e9/L Final 07/16/2019  1:05 PM [facility]   Absolute Basophils 0.0   0.0 - 0.2 10e9/L Final 07/16/2019  1:05 PM [facility]   Abs Immature Granulocytes 0.1   0 - 0.4 10e9/L Final 07/16/2019  1:05 PM [facility]   Absolute Nucleated RBC 0.0     Final 07/16/2019  1:05 PM [facility]   RBC Morphology Normal     Final 07/16/2019  1:05 PM [facility]   Platelet Estimate     Final  07/16/2019  1:05 PM [facility]   Automated count confirmed.  Platelet morphology is normal.      Component Value Flag Ref Range Units Status Collected Lab   Sodium 134   133 - 144 mmol/L Final 07/16/2019  1:05 PM [facility]   Potassium 3.7   3.4 - 5.3 mmol/L Final 07/16/2019  1:05 PM [facility]   Chloride 104   94 - 109 mmol/L Final 07/16/2019  1:05 PM [facility]   Carbon Dioxide 29   20 - 32 mmol/L Final 07/16/2019  1:05 PM [facility]   Anion Gap 1  Low   3 - 14 mmol/L Final 07/16/2019  1:05 PM [facility]   Glucose 94   70 - 99 mg/dL Final 07/16/2019  1:05 PM [facility]   Urea Nitrogen 12   7 - 30 mg/dL Final 07/16/2019  1:05 PM [facility]   Creatinine 0.81   0.52 - 1.04 mg/dL Final 07/16/2019  1:05 PM [facility]   GFR Estimate 73   >60 mL/min/ Final 07/16/2019  1:05 PM [facility]   Comment:   Non  GFR Calc   Starting 12/18/2018, serum creatinine based estimated GFR (eGFR) will be   calculated using the Chronic Kidney Disease Epidemiology Collaboration   (CKD-EPI) equation.    GFR Estimate If Black 84   >60 mL/min/ Final 07/16/2019  1:05 PM [facility]   Comment:    GFR Calc   Starting 12/18/2018, serum creatinine based estimated GFR (eGFR) will be   calculated using the Chronic Kidney Disease Epidemiology Collaboration   (CKD-EPI) equation.    Calcium 8.3  Low   8.5 - 10.1 mg/dL Final 07/16/2019  1:05 PM [facility]   Bilirubin Total 0.3   0.2 - 1.3 mg/dL Final 07/16/2019  1:05 PM [facility]   Albumin 2.8  Low   3.4 - 5.0 g/dL Final 07/16/2019  1:05 PM [facility]   Protein Total 10.8  High   6.8 - 8.8 g/dL Final 07/16/2019  1:05 PM [facility]   Alkaline Phosphatase 102   40 - 150 U/L Final 07/16/2019  1:05 PM [facility]   ALT 32   0 - 50 U/L Final 07/16/2019  1:05 PM [facility]   AST 18   0 - 45 U/L Final 07/16/2019  1:05 PM [facility]     [facility] ELP Urine 07/11/2019  2:22 PM 51   (Note)    Comment:   Monoclonal IgG immunoglobulin of kappa light chain type.   Pathological significance requires clinical correlation.     BIA Izaguirre M.D., Ph.D., Pathologist ([phone]).      Component Value Flag Ref Range Units Status  Collected Lab   Beta-2-Microglobulin 3.9  High   <2.3 mg/L Final 07/11/2019  2:20 PM 51     Component Value Flag Ref Range Units Status Collected Lab   Albumin Fraction 3.8   3.7 - 5.1 g/dL Final 07/11/2019  2:20 PM 51   Alpha 1 Fraction 0.3   0.2 - 0.4 g/dL Final 07/11/2019  2:20 PM 51   Alpha 2 Fraction 0.7   0.5 - 0.9 g/dL Final 07/11/2019  2:20 PM 51   Beta Fraction 0.6   0.6 - 1.0 g/dL Final 07/11/2019  2:20 PM 51   Gamma Fraction 5.5  High   0.7 - 1.6 g/dL Final 07/11/2019  2:20 PM 51   Monoclonal Peak 5.3  High   0.0 g/dL Final 07/11/2019  2:20 PM 51   ELP Interpretation:     Final 07/11/2019  2:20 PM 51   Large monoclonal protein (about 5.3 g/dL) seen in the gamma fraction. See immunofixation   report on same specimen. Pathologic significance requires clinical correlation. BIA Izaguirre M.D., Ph.D., Pathologist ().      Component Value Flag Ref Range Units Status Collected Lab   Iron 96   35 - 180 ug/dL Final 07/11/2019  2:20 PM [facility]   Iron Binding Cap 243   240 - 430 ug/dL Final 07/11/2019  2:20 PM [facility]   Iron Saturation Index 40   15 - 46 % Final 07/11/2019  2:20 PM [facility]     Component Value Flag Ref Range Units Status Collected Lab   Lactate Dehydrogenase 214   81 - 234 U/L Final 07/11/2019  2:20 PM [facility]     Component Value Flag Ref Range Units Status Collected Lab   Viscosity Index 2.3  High   1.4 - 1.8  Final 07/11/2019  2:20 PM 51     Immunofixation ELP (Note)     Final 07/11/2019  2:20 PM 51   Comment:   Monoclonal IgG immunoglobulin of kappa light chain type. Monoclonal   antibody therapeutics (e.g. Daratumumab) may appear as monoclonal   proteins on serum electrophoresis and immunofixation.   Results should be interpreted with caution if the patient is   receiving monoclonal antibody therapy.   Pathological significance requires clinical correlation.     BIA Izaguirre M.D., Ph.D., Pathologist ([phone]).    IGG 6,120  High   695 - 1,620 mg/dL Final 07/11/2019  2:20 PM 51   IGA 10  Low    70 - 380 mg/dL Final 07/11/2019  2:20 PM 51   IGM 6  Low   60 - 265 mg/dL Final 07/11/2019  2:20 PM      Exam Date Exam Time Accession # Results    7/11/19  2:20 PM [id number]    Component Value Flag Ref Range Units Status Collected Lab   Kappa Free Light Chains 1.16   0.33 - 1.94 mg/dL Final 07/11/2019  2:20    LAMBDA FREE LT CHAINS <0.16  Low   0.57 - 2.63 mg/dL Final 07/11/2019  2:20    KAPPA/LAMBDA RATIO SEE NOTE   0.26 - 1.65  Final 07/11/2019  2:20    Comment:   (Note)   Unable to calculate ratio due to undetectable concentration   of free kappa and/or free lambda light chains.   Performed by [facility],   [address] [place] [id number] [phone]   [email or web address], Lalito Aguirre MD, Lab. Director          FISH:  6/18/2019    RESULTS:   ABNORMAL   - Gains of chromosome 5, 9, and 15 (80%)   - Gain of 11q (87%)     NORMAL   - Rates of loss of 1p, 13q and TP53 within normal limits   - No gain of 1q   - No rearrangement of IGH     Imaging Studies:    Results for orders placed or performed during the hospital encounter of 07/11/19   MRI Lumbar spine w & w/o contrast    Narrative    PROCEDURE: MR LUMBAR SPINE W/O & W CONTRAST, 7/11/2019 3:38 PM     HISTORY:Female, age 68 years, myeloma; correlate with bone survey-  bone compression fracture L3; Multiple myeloma not having achieved  remission (H)    COMPARISON: Lumbar spine x-rays 1/26/2016 and total body bone survey  6/20/2019    TECHNIQUE: Sagittal T1, proton density, T2 fat saturation and T1  postcontrast with fat saturation; axial proton density, T2 and T1  postcontrast with fat saturation.    FINDINGS:  Curvature: There is 5 mm of degenerative anterolisthesis of L4  relative to L5.     There is diffuse heterogeneity of marrow innumerable tiny enhancing  foci, consistent with history of multiple myeloma.    Conus Medullaris: Normal in contour.     Intravertebral disks at T10-11, T11-12 and T12-L1 demonstrate normal  contour  peripherally. Small Schmorl's node is seen involving the T11  inferior endplate.    L1-2: Normal disc. Minimal endplate and facet joint degeneration.              L2-3: Mild disc degeneration and endplate degenerative changes.  Moderate bilateral facet joint degenerative change.             L3-4: Pathologic superior endplate compression fracture demonstrating  approximately 30% height loss anteriorly and 40% height loss  centrally. Enhancing lesion is seen involving the L3 spinous process  in the right L3 superior articular pillar. This enhancing lesion also  extends into the left and right lamina. Moderate degenerative changes  are seen within the facet joints.    L4-5: Chronic disc bulge and degenerative subluxation contributes  moderate central canal stenosis. Neural foramina are mildly narrowed  without evidence of compression. There are severe degenerative changes  seen within the facet joints.            L5-S1: Normally contoured disc. Minimal endplate and mild bilateral  facet joint degenerative changes.    SI Joints: Congruent. Mild degenerative changes.    Paraspinous musculature: Normal    Retroperitoneal soft tissues: Normal. No lymphadenopathy.      Impression    IMPRESSION:   1.  Pathologic superior endplate compression fracture at L3 with out  evidence of retropulsed fracture fragment.    2.  Innumerable enhancing lesions throughout the spine consistent with  history of multiple myeloma versus. There is a large enhancing focus  involving the posterior elements of L3 to include the spinous process,  left and right lamina and the right superior articular process.    3.  Moderate central canal stenosis at L4-5 secondary to chronic  bulge, hypertrophic changes of the facet joints and degenerative  subluxation.    CHRISTINA BARKER MD       ASSESSMENT/PLAN:    #1 Multiple myeloma:   IgG kappa multiple myeloma with 80% involvement of the bone marrow diagnosed June 2019 and currently undergoing therapy with  Velcade and Decadron.  The patient also had evidence of end-organ damage with anemia, elevated serum viscosity, lytic lesion at L3; elevated beta-2 microglobulin, elevated kappa lambda ratio.  M spike has decreased along with beta-2 globulin microglobulin and IgG level and ratio was undetectable showing response to therapy.  She will initiate cycle 2 today and follow-up prior to cycle 3 with myeloma labs.    #2 myeloma lesion: MRI does show a likely pathologic lesion at L3.  Note was sent to Dr. Walker for his recommendations with a possible institution of [facility].  We will await his recommendation.    #3 advanced care planning:  A goals of care conversation took place during today's visit. The people present for this conversation included: patient. Disease prognosis was discussed in detail. The patient verbalized an understanding that their disease in incurable and that the goal of treatment(s) is to prolong survival. Other topics in today's conversation included: a discussion about advanced care planning. In alignment with these goals of care, a decision was made to proceed with continued evaluation in clinic with palliative care referral at a later day because of patient preference.  POLST form was completed per patient's request.  DNR/DNI order was placed.    I encouraged patient to call with any questions or concerns.     Approximately 25 minutes spent with the patient, with >75% of this time spent in counseling and discussion of her disease and the current therapy she is undergoing for it and the possible side effects she may experience.  Time was spent in the discussion of the psychosocial effects of her cancer diagnosis and the current stressor she is undergoing in relation to her 's health.  Extensive time was under taken in regards to her desire for DNR/DNI status and a POLST form was completed for patient and this was copied and sent to scanning and she was given the original copy to bring home.   She is aware that a DNR/DNI order will be placed in her chart.  Barbie ROSSI, FNP-BC, AOCNP

## 2019-07-16 ENCOUNTER — INFUSION THERAPY VISIT (OUTPATIENT)
Dept: INFUSION THERAPY | Facility: OTHER | Age: 69
End: 2019-07-16
Attending: INTERNAL MEDICINE
Payer: MEDICARE

## 2019-07-16 ENCOUNTER — ONCOLOGY VISIT (OUTPATIENT)
Dept: ONCOLOGY | Facility: OTHER | Age: 69
End: 2019-07-16
Attending: NURSE PRACTITIONER
Payer: MEDICARE

## 2019-07-16 VITALS
SYSTOLIC BLOOD PRESSURE: 106 MMHG | WEIGHT: 155.2 LBS | TEMPERATURE: 98.5 F | OXYGEN SATURATION: 97 % | HEIGHT: 63 IN | BODY MASS INDEX: 27.5 KG/M2 | DIASTOLIC BLOOD PRESSURE: 66 MMHG | HEART RATE: 98 BPM

## 2019-07-16 VITALS
TEMPERATURE: 98.5 F | RESPIRATION RATE: 16 BRPM | DIASTOLIC BLOOD PRESSURE: 66 MMHG | BODY MASS INDEX: 27.5 KG/M2 | HEIGHT: 63 IN | SYSTOLIC BLOOD PRESSURE: 106 MMHG | HEART RATE: 98 BPM | WEIGHT: 155.2 LBS | OXYGEN SATURATION: 97 %

## 2019-07-16 DIAGNOSIS — Z71.89 ADVANCED CARE PLANNING/COUNSELING DISCUSSION: ICD-10-CM

## 2019-07-16 DIAGNOSIS — C90.00 MULTIPLE MYELOMA NOT HAVING ACHIEVED REMISSION (H): Primary | ICD-10-CM

## 2019-07-16 DIAGNOSIS — M89.9 BONE LESION: ICD-10-CM

## 2019-07-16 DIAGNOSIS — C90.00 MULTIPLE MYELOMA NOT HAVING ACHIEVED REMISSION (H): ICD-10-CM

## 2019-07-16 LAB
ALBUMIN SERPL-MCNC: 2.8 G/DL (ref 3.4–5)
ALP SERPL-CCNC: 102 U/L (ref 40–150)
ALT SERPL W P-5'-P-CCNC: 32 U/L (ref 0–50)
ANION GAP SERPL CALCULATED.3IONS-SCNC: 1 MMOL/L (ref 3–14)
AST SERPL W P-5'-P-CCNC: 18 U/L (ref 0–45)
BASOPHILS # BLD AUTO: 0 10E9/L (ref 0–0.2)
BASOPHILS NFR BLD AUTO: 0.5 %
BILIRUB SERPL-MCNC: 0.3 MG/DL (ref 0.2–1.3)
BUN SERPL-MCNC: 12 MG/DL (ref 7–30)
CALCIUM SERPL-MCNC: 8.3 MG/DL (ref 8.5–10.1)
CHLORIDE SERPL-SCNC: 104 MMOL/L (ref 94–109)
CO2 SERPL-SCNC: 29 MMOL/L (ref 20–32)
CREAT SERPL-MCNC: 0.81 MG/DL (ref 0.52–1.04)
DIFFERENTIAL METHOD BLD: ABNORMAL
EOSINOPHIL # BLD AUTO: 0.1 10E9/L (ref 0–0.7)
EOSINOPHIL NFR BLD AUTO: 1.2 %
ERYTHROCYTE [DISTWIDTH] IN BLOOD BY AUTOMATED COUNT: 15.9 % (ref 10–15)
FERRITIN SERPL-MCNC: 1310 NG/ML (ref 8–252)
GFR SERPL CREATININE-BSD FRML MDRD: 73 ML/MIN/{1.73_M2}
GLUCOSE SERPL-MCNC: 94 MG/DL (ref 70–99)
HCT VFR BLD AUTO: 27.3 % (ref 35–47)
HGB BLD-MCNC: 9.1 G/DL (ref 11.7–15.7)
IMM GRANULOCYTES # BLD: 0.1 10E9/L (ref 0–0.4)
IMM GRANULOCYTES NFR BLD: 2.1 %
LYMPHOCYTES # BLD AUTO: 1.7 10E9/L (ref 0.8–5.3)
LYMPHOCYTES NFR BLD AUTO: 26.3 %
MCH RBC QN AUTO: 31 PG (ref 26.5–33)
MCHC RBC AUTO-ENTMCNC: 33.3 G/DL (ref 31.5–36.5)
MCV RBC AUTO: 93 FL (ref 78–100)
MONOCYTES # BLD AUTO: 1.4 10E9/L (ref 0–1.3)
MONOCYTES NFR BLD AUTO: 20.7 %
NEUTROPHILS # BLD AUTO: 3.2 10E9/L (ref 1.6–8.3)
NEUTROPHILS NFR BLD AUTO: 49.2 %
NRBC # BLD AUTO: 0 10*3/UL
NRBC BLD AUTO-RTO: 0 /100
PLATELET # BLD AUTO: 304 10E9/L (ref 150–450)
PLATELET # BLD EST: ABNORMAL 10*3/UL
POTASSIUM SERPL-SCNC: 3.7 MMOL/L (ref 3.4–5.3)
PROT SERPL-MCNC: 10.8 G/DL (ref 6.8–8.8)
RBC # BLD AUTO: 2.94 10E12/L (ref 3.8–5.2)
RBC MORPH BLD: NORMAL
SODIUM SERPL-SCNC: 134 MMOL/L (ref 133–144)
WBC # BLD AUTO: 6.6 10E9/L (ref 4–11)

## 2019-07-16 PROCEDURE — 99214 OFFICE O/P EST MOD 30 MIN: CPT | Performed by: NURSE PRACTITIONER

## 2019-07-16 PROCEDURE — 36415 COLL VENOUS BLD VENIPUNCTURE: CPT | Mod: ZL | Performed by: INTERNAL MEDICINE

## 2019-07-16 PROCEDURE — 80053 COMPREHEN METABOLIC PANEL: CPT | Mod: ZL | Performed by: INTERNAL MEDICINE

## 2019-07-16 PROCEDURE — 82728 ASSAY OF FERRITIN: CPT | Mod: ZL | Performed by: INTERNAL MEDICINE

## 2019-07-16 PROCEDURE — 25000128 H RX IP 250 OP 636: Mod: JW | Performed by: INTERNAL MEDICINE

## 2019-07-16 PROCEDURE — G0463 HOSPITAL OUTPT CLINIC VISIT: HCPCS

## 2019-07-16 PROCEDURE — 85025 COMPLETE CBC W/AUTO DIFF WBC: CPT | Mod: ZL | Performed by: INTERNAL MEDICINE

## 2019-07-16 PROCEDURE — G0463 HOSPITAL OUTPT CLINIC VISIT: HCPCS | Mod: 25

## 2019-07-16 PROCEDURE — 96401 CHEMO ANTI-NEOPL SQ/IM: CPT

## 2019-07-16 RX ORDER — ZOLEDRONIC ACID 0.04 MG/ML
4 INJECTION, SOLUTION INTRAVENOUS ONCE
Status: CANCELLED | OUTPATIENT
Start: 2019-07-22

## 2019-07-16 RX ADMIN — BORTEZOMIB 2.3 MG: 3.5 INJECTION, POWDER, LYOPHILIZED, FOR SOLUTION INTRAVENOUS; SUBCUTANEOUS at 15:35

## 2019-07-16 ASSESSMENT — PATIENT HEALTH QUESTIONNAIRE - PHQ9: SUM OF ALL RESPONSES TO PHQ QUESTIONS 1-9: 0

## 2019-07-16 ASSESSMENT — PAIN SCALES - GENERAL: PAINLEVEL: NO PAIN (0)

## 2019-07-16 ASSESSMENT — MIFFLIN-ST. JEOR
SCORE: 1193.11
SCORE: 1192.98

## 2019-07-16 NOTE — NURSING NOTE
"Chief Complaint   Patient presents with     RECHECK     elevated blood protein/[place] referring       Initial /66   Pulse 98   Temp 98.5  F (36.9  C) (Tympanic)   Ht 1.6 m (5' 3\")   Wt 70.4 kg (155 lb 3.2 oz)   SpO2 97%   BMI 27.49 kg/m   Estimated body mass index is 27.49 kg/m  as calculated from the following:    Height as of this encounter: 1.6 m (5' 3\").    Weight as of this encounter: 70.4 kg (155 lb 3.2 oz).  Medication Reconciliation: complete   Immunizations reviewed; 0 pain; AHD on file; phq9=0  Elena Holcomb  "

## 2019-07-16 NOTE — PATIENT INSTRUCTIONS
We would like to see you back per your calender.     When you are in need of a refill, please call your pharmacy and they will send us a request.     If you have any questions please call [phone]    Other instructions:  none

## 2019-07-16 NOTE — PROGRESS NOTES
Patient is a 67 y/o female here today accompanied by self for injection of velcade per order of Dr Walker. Patient meets   parameters to administer injection; today's lab values are WBC: 6.6, HGB: 9.1,  PLT: 304,  ANC: 3.2, Creatinine clearance: 60.8ml/min, Scr: 0.81.  Patient denies questions nor concerns regarding medication, administration site, side effects, nor aftercare.  Patient identified with two identifiers, order verified, and verbal consent for today's injection obtained from patient.     Patient education provided on s/s of injection site infection, and/or medication specific side effects, and when to call a provider.  Patient instructed to report any adverse effects.   Velcade administered per protocol in right upper arm at 45 degree angle.  Site covered with sterile bandage.  Patient tolerated injection well, no verbal nor non-verbal signs of discomfort noted.  No adverse effects noted at this time.   Patient instructed to call with further questions or concerns.  Patient states understanding and is in agreement with this plan.  Copy of appointments, and after visit summary (AVS) given to patient. Patient discharged ambulatory.  Irene Booth RN

## 2019-07-16 NOTE — PATIENT INSTRUCTIONS
Patient Education     Velcade  Generic Name: Bortezomib  Drug type  Velcade stops the growth of certain cancer cells.  What this drug is used for   Multiple myeloma, mantle cell lymphoma   or _______________________________________.   How this drug is given  This drug is usually given by IV or as a shot under the skin (thigh or stomach). If you receive the medicine by IV, it may leak out of the vein. Your nurses are trained to catch this, but please let them know right away if you think the medicine is leaking. You would feel pain, discomfort, and heat. You might see redness on the skin around the IV.  The amount of medicine that you receive depends on many things. Your doctor will decide on the best dose for you.  Make sure your health care team knows about all the medicines and supplements you take. This will help prevent drug interactions.   Side effects  Most people do not have all the side effects listed. Side effects will usually go away after treatment is complete. Rare side effects are not listed here, so tell your doctor if you have any unusual symptoms.  Common side effects (occur in more than 3 out of 10 people):    Feeling tired and weak    Numbness and tingling in the hands and feet    Loose, watery stools (diarrhea) or hard, dry stools (constipation)    Feeling sick to your stomach (nausea)    Throwing up (vomiting)    Do not feel hungry    Fever    Low platelets (risk of bleeding)    Low red blood cells (anemia)  Less common side effects (occur in less than 3 out of 10 people):    Headache    Trouble sleeping    Joint or bone pain    Muscle cramps    Swelling in the body    Feel dizzy    Rash, Itching    Blurry vision    Cough    Heartburn    Low blood pressure  Call your doctor right away if you have:    Fever of 100.4 F (38 C) or higher and/or chills    Signs of severe allergy: trouble breathing, your throat closes up, swelling of the mouth, face, lips or tongue, or hives (red, itchy spots on the  skin).  Call your doctor within 24 hours if you have:    Nausea so bad that you can't eat or drink and medicine doesn't help    Loose, watery stools 4 to 6 times in 24 hours    Blisters on the skin where IV was inserted    You feel so tired that you can't care for yourself    Vomiting more than 4 to 5 times in 24 hours    Black or tarry stools or blood in your stools    Blood in urine (pee)    Unusual bruising or bleeding    Shortness of breath    Mouth pain    Swelling or redness in just one arm or leg    Swelling of the feet or ankles or sudden weight gain.    Numbness or tingling in the hands or feet  Other information:________________________  _____________________________________  _____________________________________  If you have any side effects, call us. We can help you cope with them.   For more information, see:  www.chemocare.com   www.nlm.nih.gov/medlineplus/druginformation.htm  www.cancer.gov/cancertopics/coping/chemotherapy-and-you  For informational purposes only. Not to replace the advice of your health care provider.   Developed in collaboration with [facility].  Copyright   2014 [employer]. All rights reserved. [employer] 525341 - REV 05/16.  For informational purposes only. Not to replace the advice of your health care provider.  Copyright   2018 [employer]. All rights reserved.

## 2019-07-17 ENCOUNTER — TELEPHONE (OUTPATIENT)
Dept: ONCOLOGY | Facility: OTHER | Age: 69
End: 2019-07-17

## 2019-07-17 NOTE — TELEPHONE ENCOUNTER
Rec'd call from Dr. Walker that he wants pt to have zometa infusions 4 mg every 4 months.     Call placed to patient to inform her of the above. [facility] has scheduled pt for her 1st infusion of Zometa on the day she comes for her velcade injection. Pt verbalizes understanding and agrees with this plan.

## 2019-07-19 ENCOUNTER — INFUSION THERAPY VISIT (OUTPATIENT)
Dept: INFUSION THERAPY | Facility: OTHER | Age: 69
End: 2019-07-19
Attending: INTERNAL MEDICINE
Payer: MEDICARE

## 2019-07-19 VITALS
TEMPERATURE: 98.8 F | HEIGHT: 63 IN | SYSTOLIC BLOOD PRESSURE: 100 MMHG | BODY MASS INDEX: 27.2 KG/M2 | WEIGHT: 153.5 LBS | OXYGEN SATURATION: 94 % | RESPIRATION RATE: 16 BRPM | HEART RATE: 100 BPM | DIASTOLIC BLOOD PRESSURE: 56 MMHG

## 2019-07-19 DIAGNOSIS — C90.00 MULTIPLE MYELOMA NOT HAVING ACHIEVED REMISSION (H): Primary | ICD-10-CM

## 2019-07-19 LAB
ALBUMIN SERPL-MCNC: 2.7 G/DL (ref 3.4–5)
CALCIUM SERPL-MCNC: 8.1 MG/DL (ref 8.5–10.1)
CREAT SERPL-MCNC: 0.82 MG/DL (ref 0.52–1.04)
GFR SERPL CREATININE-BSD FRML MDRD: 73 ML/MIN/{1.73_M2}

## 2019-07-19 PROCEDURE — 96417 CHEMO IV INFUS EACH ADDL SEQ: CPT

## 2019-07-19 PROCEDURE — 25000128 H RX IP 250 OP 636: Performed by: INTERNAL MEDICINE

## 2019-07-19 PROCEDURE — 82310 ASSAY OF CALCIUM: CPT | Mod: ZL | Performed by: INTERNAL MEDICINE

## 2019-07-19 PROCEDURE — 96401 CHEMO ANTI-NEOPL SQ/IM: CPT

## 2019-07-19 PROCEDURE — 96413 CHEMO IV INFUSION 1 HR: CPT

## 2019-07-19 PROCEDURE — 82565 ASSAY OF CREATININE: CPT | Mod: ZL | Performed by: INTERNAL MEDICINE

## 2019-07-19 PROCEDURE — 36415 COLL VENOUS BLD VENIPUNCTURE: CPT | Mod: ZL | Performed by: INTERNAL MEDICINE

## 2019-07-19 PROCEDURE — 82040 ASSAY OF SERUM ALBUMIN: CPT | Mod: ZL | Performed by: INTERNAL MEDICINE

## 2019-07-19 RX ORDER — ZOLEDRONIC ACID 0.04 MG/ML
4 INJECTION, SOLUTION INTRAVENOUS ONCE
Status: COMPLETED | OUTPATIENT
Start: 2019-07-19 | End: 2019-07-19

## 2019-07-19 RX ADMIN — ZOLEDRONIC ACID 4 MG: 0.04 INJECTION, SOLUTION INTRAVENOUS at 14:53

## 2019-07-19 RX ADMIN — BORTEZOMIB 2.3 MG: 3.5 INJECTION, POWDER, LYOPHILIZED, FOR SOLUTION INTRAVENOUS; SUBCUTANEOUS at 14:26

## 2019-07-19 RX ADMIN — SODIUM CHLORIDE 250 ML: 9 INJECTION, SOLUTION INTRAVENOUS at 14:34

## 2019-07-19 ASSESSMENT — MIFFLIN-ST. JEOR: SCORE: 1185.24

## 2019-07-19 ASSESSMENT — PAIN SCALES - GENERAL: PAINLEVEL: NO PAIN (0)

## 2019-07-19 NOTE — PROGRESS NOTES
Patient is a 68 year old female here accompanied by self today for infusion of Zometa per order of Dr. Walker.  Patient meets parameters for today's infusion. Patient identified with two identifiers, order verified, and verbal consent for today's infusion obtained from patient.      Recent lab values:  Calcium: 8.1, Creatinine: 0.82, Albumin: 2.7  Patient meets order parameters for today's treatment.     22 gauge angio cath inserted into right lower forearm.  Immediate blood return noted.  IV secured with sterile, transparent dressing and tape.  Patient tolerated well, denies pain or discomfort at this time.  Flushes easily without resistance, no signs or symptoms of infiltration or infection.   Patient denies questions or concerns regarding infusion and/or medication(s) being administered.    Patient tolerated infusion well, no signs or symptoms of adverse reaction noted.  Patient denies pain nor discomfort.     IV removed, catheter intact.  Site clean, dry and intact.  No signs or symptoms of infiltration or infection.  Covered with a sterile bandage, slight pressure applied for 30 seconds.  Pt instructed to leave bandage intact for a minimum of one hour, and to call with questions or concerns.  Copy of appointments, discharge instructions, and after visit summary (AVS) provided to patient.  Patient states understanding, discharged ambulatory.

## 2019-07-19 NOTE — PROGRESS NOTES
Patient is a 68 year old female here today for injection of Velcade per order of . Patient meets parameters for today's infusion. Patient identified with two identifiers, order verified, and verbal consent for today's infusion obtained from patient. Written consent for treatment is on file and valid.    Patient denies questions or concerns regarding injection and/or medication(s) being administered.  Velcade injected SQ into left upper outer arm at 45 degree angle  per protocol rotating sites.     Injection administered per protocol. Patient tolerated infusion without incident, no signs or symptoms of adverse reaction noted. Patient denies pain or discomfort.     Covered with a sterile bandage. Pt instructed to leave bandage intact for a minimum of one hour, and to call with questions or concerns. Copy of appointments, discharge instructions, and after visit summary (AVS) provided to patient. Patient states understanding, discharged ambulatory.

## 2019-07-19 NOTE — PROGRESS NOTES
IV pump verified with dose, drug, and rate of administration.  Infusion administered per protocol.

## 2019-07-23 ENCOUNTER — INFUSION THERAPY VISIT (OUTPATIENT)
Dept: INFUSION THERAPY | Facility: OTHER | Age: 69
End: 2019-07-23
Attending: INTERNAL MEDICINE
Payer: MEDICARE

## 2019-07-23 VITALS
RESPIRATION RATE: 16 BRPM | HEART RATE: 87 BPM | DIASTOLIC BLOOD PRESSURE: 69 MMHG | OXYGEN SATURATION: 96 % | HEIGHT: 63 IN | SYSTOLIC BLOOD PRESSURE: 121 MMHG | TEMPERATURE: 98.6 F | WEIGHT: 153.4 LBS | BODY MASS INDEX: 27.18 KG/M2

## 2019-07-23 DIAGNOSIS — C90.00 MULTIPLE MYELOMA NOT HAVING ACHIEVED REMISSION (H): Primary | ICD-10-CM

## 2019-07-23 DIAGNOSIS — C90.00 MULTIPLE MYELOMA NOT HAVING ACHIEVED REMISSION (H): ICD-10-CM

## 2019-07-23 LAB
BASOPHILS # BLD AUTO: 0 10E9/L (ref 0–0.2)
BASOPHILS NFR BLD AUTO: 0 %
DIFFERENTIAL METHOD BLD: ABNORMAL
EOSINOPHIL # BLD AUTO: 0.1 10E9/L (ref 0–0.7)
EOSINOPHIL NFR BLD AUTO: 1 %
ERYTHROCYTE [DISTWIDTH] IN BLOOD BY AUTOMATED COUNT: 16 % (ref 10–15)
HCT VFR BLD AUTO: 26.8 % (ref 35–47)
HGB BLD-MCNC: 8.9 G/DL (ref 11.7–15.7)
LYMPHOCYTES # BLD AUTO: 3.2 10E9/L (ref 0.8–5.3)
LYMPHOCYTES NFR BLD AUTO: 41 %
MCH RBC QN AUTO: 30.7 PG (ref 26.5–33)
MCHC RBC AUTO-ENTMCNC: 33.2 G/DL (ref 31.5–36.5)
MCV RBC AUTO: 92 FL (ref 78–100)
MONOCYTES # BLD AUTO: 0.7 10E9/L (ref 0–1.3)
MONOCYTES NFR BLD AUTO: 9 %
MYELOCYTES # BLD: 0.2 10E9/L
MYELOCYTES NFR BLD MANUAL: 3 %
NEUTROPHILS # BLD AUTO: 3.5 10E9/L (ref 1.6–8.3)
NEUTROPHILS NFR BLD AUTO: 45 %
NEUTS BAND # BLD AUTO: 0.1 10E9/L (ref 0–0.6)
NEUTS BAND NFR BLD MANUAL: 1 %
PLATELET # BLD AUTO: 131 10E9/L (ref 150–450)
PLATELET # BLD EST: ABNORMAL 10*3/UL
RBC # BLD AUTO: 2.9 10E12/L (ref 3.8–5.2)
RBC MORPH BLD: NORMAL
WBC # BLD AUTO: 7.8 10E9/L (ref 4–11)

## 2019-07-23 PROCEDURE — 82784 ASSAY IGA/IGD/IGG/IGM EACH: CPT | Mod: ZL | Performed by: NURSE PRACTITIONER

## 2019-07-23 PROCEDURE — 36415 COLL VENOUS BLD VENIPUNCTURE: CPT | Mod: ZL | Performed by: NURSE PRACTITIONER

## 2019-07-23 PROCEDURE — 96402 CHEMO HORMON ANTINEOPL SQ/IM: CPT

## 2019-07-23 PROCEDURE — 25000128 H RX IP 250 OP 636: Performed by: INTERNAL MEDICINE

## 2019-07-23 PROCEDURE — 83883 ASSAY NEPHELOMETRY NOT SPEC: CPT | Mod: ZL | Performed by: NURSE PRACTITIONER

## 2019-07-23 PROCEDURE — 85025 COMPLETE CBC W/AUTO DIFF WBC: CPT | Mod: ZL | Performed by: INTERNAL MEDICINE

## 2019-07-23 PROCEDURE — 36415 COLL VENOUS BLD VENIPUNCTURE: CPT | Mod: ZL | Performed by: INTERNAL MEDICINE

## 2019-07-23 RX ADMIN — BORTEZOMIB 2.3 MG: 3.5 INJECTION, POWDER, LYOPHILIZED, FOR SOLUTION INTRAVENOUS; SUBCUTANEOUS at 15:04

## 2019-07-23 ASSESSMENT — MIFFLIN-ST. JEOR: SCORE: 1184.82

## 2019-07-23 NOTE — PROGRESS NOTES
Patient is a 69 y/o female here today for injection of Velcade per order of Dr Walker. Patient meets parameters to administer injection; today's lab values are WBC:7.8, HGB: 8.9, PLT: 131,  ANC: 3.5, creatinine clearance: 59.8mL/min, Scr: 0.82.  Velcade dose verified with Christine Otoole RN prior to release or drug. Patient denies questions nor concerns regarding medication, administration site, side effects, nor aftercare.  Patient identified with two identifiers, order verified, and verbal consent for today's injection obtained from patient.     Patient education provided on s/s of injection site infection, and/or medication specific side effects, and when to call a provider.  Patient instructed to report any adverse effects.   Velcade administered subcutaneous per protocol in right upper outer arm at 45 degree angle.  Site covered with sterile bandage.  Patient tolerated injection well, no verbal nor non-verbal signs of discomfort noted.  No adverse effects noted at this time.   Patient instructed to call with further questions or concerns.  Patient states understanding and is in agreement with this plan.  Patient declines copy of appointments, and after visit summary (AVS). Patient discharged ambulatory.  Irene Booth RN

## 2019-07-23 NOTE — PROGRESS NOTES
Patient is a 67 y/o female here today for injection of Velcade per order of Dr Walker. Patient meets parameters to administer injection; today's lab values are WBC:7.8, HGB: 8.9, PLT: 131,  ANC: 3.5, creatinine clearance: 59.8mL/min, Scr: 0.82.  Patient denies questions nor concerns regarding medication, administration site, side effects, nor aftercare.  Patient identified with two identifiers, order verified, and verbal consent for today's injection obtained from patient.     Patient education provided on s/s of injection site infection, and/or medication specific side effects, and when to call a provider.  Patient instructed to report any adverse effects.   Medication administered per protocol in *** at ***.  Site covered with sterile bandage.  Patient tolerated injection well, no verbal nor non-verbal signs of discomfort noted.  No adverse effects noted at this time.   Patient instructed to call with further questions or concerns.  Patient states understanding and is in agreement with this plan.  Copy of appointments, and after visit summary (AVS) given to patient. Patient discharged ambulatory.  Irene Booth RN

## 2019-07-23 NOTE — PATIENT INSTRUCTIONS
Patient Education     Velcade  Generic Name: Bortezomib  Drug type  Velcade stops the growth of certain cancer cells.  What this drug is used for   Multiple myeloma, mantle cell lymphoma   or _______________________________________.   How this drug is given  This drug is usually given by IV or as a shot under the skin (thigh or stomach). If you receive the medicine by IV, it may leak out of the vein. Your nurses are trained to catch this, but please let them know right away if you think the medicine is leaking. You would feel pain, discomfort, and heat. You might see redness on the skin around the IV.  The amount of medicine that you receive depends on many things. Your doctor will decide on the best dose for you.  Make sure your health care team knows about all the medicines and supplements you take. This will help prevent drug interactions.   Side effects  Most people do not have all the side effects listed. Side effects will usually go away after treatment is complete. Rare side effects are not listed here, so tell your doctor if you have any unusual symptoms.  Common side effects (occur in more than 3 out of 10 people):    Feeling tired and weak    Numbness and tingling in the hands and feet    Loose, watery stools (diarrhea) or hard, dry stools (constipation)    Feeling sick to your stomach (nausea)    Throwing up (vomiting)    Do not feel hungry    Fever    Low platelets (risk of bleeding)    Low red blood cells (anemia)  Less common side effects (occur in less than 3 out of 10 people):    Headache    Trouble sleeping    Joint or bone pain    Muscle cramps    Swelling in the body    Feel dizzy    Rash, Itching    Blurry vision    Cough    Heartburn    Low blood pressure  Call your doctor right away if you have:    Fever of 100.4 F (38 C) or higher and/or chills    Signs of severe allergy: trouble breathing, your throat closes up, swelling of the mouth, face, lips or tongue, or hives (red, itchy spots on the  skin).  Call your doctor within 24 hours if you have:    Nausea so bad that you can't eat or drink and medicine doesn't help    Loose, watery stools 4 to 6 times in 24 hours    Blisters on the skin where IV was inserted    You feel so tired that you can't care for yourself    Vomiting more than 4 to 5 times in 24 hours    Black or tarry stools or blood in your stools    Blood in urine (pee)    Unusual bruising or bleeding    Shortness of breath    Mouth pain    Swelling or redness in just one arm or leg    Swelling of the feet or ankles or sudden weight gain.    Numbness or tingling in the hands or feet  Other information:________________________  _____________________________________  _____________________________________  If you have any side effects, call us. We can help you cope with them.   For more information, see:  www.chemocare.com   www.nlm.nih.gov/medlineplus/druginformation.htm  www.cancer.gov/cancertopics/coping/chemotherapy-and-you  For informational purposes only. Not to replace the advice of your health care provider.   Developed in collaboration with [facility].  Copyright   2014 [employer]. All rights reserved. [employer] [id number] - REV 05/16.  For informational purposes only. Not to replace the advice of your health care provider.  Copyright   2018 [employer]. All rights reserved.

## 2019-07-26 ENCOUNTER — INFUSION THERAPY VISIT (OUTPATIENT)
Dept: INFUSION THERAPY | Facility: OTHER | Age: 69
End: 2019-07-26
Attending: INTERNAL MEDICINE
Payer: MEDICARE

## 2019-07-26 ENCOUNTER — TELEPHONE (OUTPATIENT)
Dept: ONCOLOGY | Facility: OTHER | Age: 69
End: 2019-07-26

## 2019-07-26 VITALS
HEART RATE: 72 BPM | BODY MASS INDEX: 27.3 KG/M2 | SYSTOLIC BLOOD PRESSURE: 122 MMHG | DIASTOLIC BLOOD PRESSURE: 84 MMHG | WEIGHT: 154.1 LBS | TEMPERATURE: 97.6 F | HEIGHT: 63 IN | RESPIRATION RATE: 16 BRPM | OXYGEN SATURATION: 97 %

## 2019-07-26 DIAGNOSIS — C90.00 MULTIPLE MYELOMA NOT HAVING ACHIEVED REMISSION (H): Primary | ICD-10-CM

## 2019-07-26 PROCEDURE — 25000128 H RX IP 250 OP 636: Performed by: INTERNAL MEDICINE

## 2019-07-26 PROCEDURE — 96401 CHEMO ANTI-NEOPL SQ/IM: CPT

## 2019-07-26 RX ADMIN — BORTEZOMIB 2.3 MG: 3.5 INJECTION, POWDER, LYOPHILIZED, FOR SOLUTION INTRAVENOUS; SUBCUTANEOUS at 13:09

## 2019-07-26 ASSESSMENT — MIFFLIN-ST. JEOR: SCORE: 1188.12

## 2019-07-26 ASSESSMENT — PAIN SCALES - GENERAL: PAINLEVEL: NO PAIN (0)

## 2019-07-26 NOTE — TELEPHONE ENCOUNTER
Called [facility] and cancelled Revlimid order.Spoke with Malcolm Garcia , [facility]   Oncology Care Coordinatior

## 2019-07-26 NOTE — PROGRESS NOTES
Patient is a 68 year old female here today for injection of Velcade per order of . Patient meets parameters for today's infusion. Patient identified with two identifiers, order verified, and verbal consent for today's infusion obtained from patient. Written consent for treatment is on file and valid.    Patient denies questions or concerns regarding injection and/or medication(s) being administered.  Velcade injected SQ into left upper outer arm per protocol rotating sites.     Injection administered per protocol. Patient tolerated infusion without incident, no signs or symptoms of adverse reaction noted. Patient denies pain or discomfort.     Covered with a sterile bandage. Pt instructed to leave bandage intact for a minimum of one hour, and to call with questions or concerns. Copy of appointments, discharge instructions, and after visit summary (AVS) provided to patient. Patient states understanding, discharged ambulatory.

## 2019-07-30 ENCOUNTER — TELEPHONE (OUTPATIENT)
Dept: INFUSION THERAPY | Facility: OTHER | Age: 69
End: 2019-07-30

## 2019-07-30 DIAGNOSIS — C90.00 MULTIPLE MYELOMA NOT HAVING ACHIEVED REMISSION (H): ICD-10-CM

## 2019-07-30 PROCEDURE — 00000402 ZZHCL STATISTIC TOTAL PROTEIN: Mod: ZL | Performed by: INTERNAL MEDICINE

## 2019-07-30 PROCEDURE — 36415 COLL VENOUS BLD VENIPUNCTURE: CPT | Mod: ZL | Performed by: INTERNAL MEDICINE

## 2019-07-30 PROCEDURE — 82232 ASSAY OF BETA-2 PROTEIN: CPT | Mod: ZL | Performed by: INTERNAL MEDICINE

## 2019-07-30 PROCEDURE — 99000 SPECIMEN HANDLING OFFICE-LAB: CPT | Performed by: INTERNAL MEDICINE

## 2019-07-30 PROCEDURE — 83883 ASSAY NEPHELOMETRY NOT SPEC: CPT | Mod: ZL | Performed by: INTERNAL MEDICINE

## 2019-07-30 PROCEDURE — 86334 IMMUNOFIX E-PHORESIS SERUM: CPT | Mod: ZL | Performed by: INTERNAL MEDICINE

## 2019-07-30 PROCEDURE — 82784 ASSAY IGA/IGD/IGG/IGM EACH: CPT | Mod: ZL | Performed by: INTERNAL MEDICINE

## 2019-07-30 PROCEDURE — 84165 PROTEIN E-PHORESIS SERUM: CPT | Mod: ZL | Performed by: INTERNAL MEDICINE

## 2019-07-30 PROCEDURE — 85810 BLOOD VISCOSITY EXAMINATION: CPT | Mod: ZL | Performed by: INTERNAL MEDICINE

## 2019-07-30 NOTE — TELEPHONE ENCOUNTER
[facility]    1. In the last 7 days, how OFTEN did you have nausea ? Rarely      In the last 7 days, what was the SEVERITY of your NAUSEA at its WORST? Mild     2. In the last 7 days, what was the SEVERITY of your CONSTIPATION at its WORST? None    3. In the last 7 days, how OFTEN did you have PAIN? Occasionally      In the last 7 days, what was the SEVERITY of your PAIN at its WORST? Mild      In the last 7 days, how much did PAIN INTERFERE with your usual or daily activities? Not at all     Pain longstanding since prior to chemo. Patient notes that if she has a busier day, she gets lower back pain at night sometimes. Manages with positioning in bed and sleeps well, then by morning pain is gone. Denies needs or concerns.     Do you have any other symptoms that you wish to report ? Yes    Please list any other symptoms:    Some loose stools for a day after treatment but mild and returns to baseline quickly. Denies any abd pain. Some fatigue but very manageable.         Note routed to Provider for review No

## 2019-08-01 LAB
ALBUMIN SERPL ELPH-MCNC: 3.6 G/DL (ref 3.7–5.1)
ALPHA1 GLOB SERPL ELPH-MCNC: 0.4 G/DL (ref 0.2–0.4)
ALPHA2 GLOB SERPL ELPH-MCNC: 0.6 G/DL (ref 0.5–0.9)
B-GLOBULIN SERPL ELPH-MCNC: 0.6 G/DL (ref 0.6–1)
B2 MICROGLOB SERPL-MCNC: 3.8 MG/L
GAMMA GLOB SERPL ELPH-MCNC: 4.3 G/DL (ref 0.7–1.6)
IGA SERPL-MCNC: 8 MG/DL (ref 70–380)
IGG SERPL-MCNC: 5090 MG/DL (ref 695–1620)
IGM SERPL-MCNC: 8 MG/DL (ref 60–265)
M PROTEIN SERPL ELPH-MCNC: 4.2 G/DL
PROT PATTERN SERPL ELPH-IMP: ABNORMAL
PROT PATTERN SERPL IFE-IMP: NORMAL
VISC SER: 2.1 CP (ref 1.4–1.8)

## 2019-08-02 LAB
KAPPA LC FREE SER-MCNC: 1.16 MG/DL (ref 0.33–1.94)
KAPPA LC FREE/LAMBDA FREE SER NEPH: ABNORMAL {RATIO} (ref 0.26–1.65)
LAMBDA LC FREE SERPL-MCNC: <0.16 MG/DL (ref 0.57–2.63)

## 2019-08-05 RX ORDER — ALBUTEROL SULFATE 90 UG/1
1-2 AEROSOL, METERED RESPIRATORY (INHALATION)
Status: CANCELLED
Start: 2019-08-16

## 2019-08-05 RX ORDER — ALBUTEROL SULFATE 90 UG/1
1-2 AEROSOL, METERED RESPIRATORY (INHALATION)
Status: CANCELLED
Start: 2019-08-06

## 2019-08-05 RX ORDER — MEPERIDINE HYDROCHLORIDE 25 MG/ML
25 INJECTION INTRAMUSCULAR; INTRAVENOUS; SUBCUTANEOUS EVERY 30 MIN PRN
Status: CANCELLED | OUTPATIENT
Start: 2019-08-16

## 2019-08-05 RX ORDER — ALBUTEROL SULFATE 0.83 MG/ML
2.5 SOLUTION RESPIRATORY (INHALATION)
Status: CANCELLED | OUTPATIENT
Start: 2019-08-06

## 2019-08-05 RX ORDER — EPINEPHRINE 1 MG/ML
0.3 INJECTION, SOLUTION, CONCENTRATE INTRAVENOUS EVERY 5 MIN PRN
Status: CANCELLED | OUTPATIENT
Start: 2019-08-06

## 2019-08-05 RX ORDER — METHYLPREDNISOLONE SODIUM SUCCINATE 125 MG/2ML
125 INJECTION, POWDER, LYOPHILIZED, FOR SOLUTION INTRAMUSCULAR; INTRAVENOUS
Status: CANCELLED
Start: 2019-08-09

## 2019-08-05 RX ORDER — EPINEPHRINE 0.3 MG/.3ML
0.3 INJECTION SUBCUTANEOUS EVERY 5 MIN PRN
Status: CANCELLED | OUTPATIENT
Start: 2019-08-16

## 2019-08-05 RX ORDER — ALBUTEROL SULFATE 0.83 MG/ML
2.5 SOLUTION RESPIRATORY (INHALATION)
Status: CANCELLED | OUTPATIENT
Start: 2019-08-13

## 2019-08-05 RX ORDER — DIPHENHYDRAMINE HYDROCHLORIDE 50 MG/ML
50 INJECTION INTRAMUSCULAR; INTRAVENOUS
Status: CANCELLED
Start: 2019-08-13

## 2019-08-05 RX ORDER — METHYLPREDNISOLONE SODIUM SUCCINATE 125 MG/2ML
125 INJECTION, POWDER, LYOPHILIZED, FOR SOLUTION INTRAMUSCULAR; INTRAVENOUS
Status: CANCELLED
Start: 2019-08-16

## 2019-08-05 RX ORDER — MEPERIDINE HYDROCHLORIDE 25 MG/ML
25 INJECTION INTRAMUSCULAR; INTRAVENOUS; SUBCUTANEOUS EVERY 30 MIN PRN
Status: CANCELLED | OUTPATIENT
Start: 2019-08-06

## 2019-08-05 RX ORDER — NALOXONE HYDROCHLORIDE 0.4 MG/ML
.1-.4 INJECTION, SOLUTION INTRAMUSCULAR; INTRAVENOUS; SUBCUTANEOUS
Status: CANCELLED | OUTPATIENT
Start: 2019-08-13

## 2019-08-05 RX ORDER — ALBUTEROL SULFATE 0.83 MG/ML
2.5 SOLUTION RESPIRATORY (INHALATION)
Status: CANCELLED | OUTPATIENT
Start: 2019-08-09

## 2019-08-05 RX ORDER — EPINEPHRINE 1 MG/ML
0.3 INJECTION, SOLUTION, CONCENTRATE INTRAVENOUS EVERY 5 MIN PRN
Status: CANCELLED | OUTPATIENT
Start: 2019-08-09

## 2019-08-05 RX ORDER — SODIUM CHLORIDE 9 MG/ML
1000 INJECTION, SOLUTION INTRAVENOUS CONTINUOUS PRN
Status: CANCELLED
Start: 2019-08-16

## 2019-08-05 RX ORDER — NALOXONE HYDROCHLORIDE 0.4 MG/ML
.1-.4 INJECTION, SOLUTION INTRAMUSCULAR; INTRAVENOUS; SUBCUTANEOUS
Status: CANCELLED | OUTPATIENT
Start: 2019-08-09

## 2019-08-05 RX ORDER — ALBUTEROL SULFATE 90 UG/1
1-2 AEROSOL, METERED RESPIRATORY (INHALATION)
Status: CANCELLED
Start: 2019-08-09

## 2019-08-05 RX ORDER — EPINEPHRINE 0.3 MG/.3ML
0.3 INJECTION SUBCUTANEOUS EVERY 5 MIN PRN
Status: CANCELLED | OUTPATIENT
Start: 2019-08-09

## 2019-08-05 RX ORDER — METHYLPREDNISOLONE SODIUM SUCCINATE 125 MG/2ML
125 INJECTION, POWDER, LYOPHILIZED, FOR SOLUTION INTRAMUSCULAR; INTRAVENOUS
Status: CANCELLED
Start: 2019-08-13

## 2019-08-05 RX ORDER — NALOXONE HYDROCHLORIDE 0.4 MG/ML
.1-.4 INJECTION, SOLUTION INTRAMUSCULAR; INTRAVENOUS; SUBCUTANEOUS
Status: CANCELLED | OUTPATIENT
Start: 2019-08-06

## 2019-08-05 RX ORDER — DIPHENHYDRAMINE HYDROCHLORIDE 50 MG/ML
50 INJECTION INTRAMUSCULAR; INTRAVENOUS
Status: CANCELLED
Start: 2019-08-09

## 2019-08-05 RX ORDER — LORAZEPAM 2 MG/ML
0.5 INJECTION INTRAMUSCULAR EVERY 4 HOURS PRN
Status: CANCELLED
Start: 2019-08-06

## 2019-08-05 RX ORDER — ALBUTEROL SULFATE 90 UG/1
1-2 AEROSOL, METERED RESPIRATORY (INHALATION)
Status: CANCELLED
Start: 2019-08-13

## 2019-08-05 RX ORDER — DIPHENHYDRAMINE HYDROCHLORIDE 50 MG/ML
50 INJECTION INTRAMUSCULAR; INTRAVENOUS
Status: CANCELLED
Start: 2019-08-06

## 2019-08-05 RX ORDER — DIPHENHYDRAMINE HYDROCHLORIDE 50 MG/ML
50 INJECTION INTRAMUSCULAR; INTRAVENOUS
Status: CANCELLED
Start: 2019-08-16

## 2019-08-05 RX ORDER — EPINEPHRINE 0.3 MG/.3ML
0.3 INJECTION SUBCUTANEOUS EVERY 5 MIN PRN
Status: CANCELLED | OUTPATIENT
Start: 2019-08-06

## 2019-08-05 RX ORDER — LORAZEPAM 2 MG/ML
0.5 INJECTION INTRAMUSCULAR EVERY 4 HOURS PRN
Status: CANCELLED
Start: 2019-08-09

## 2019-08-05 RX ORDER — ALBUTEROL SULFATE 0.83 MG/ML
2.5 SOLUTION RESPIRATORY (INHALATION)
Status: CANCELLED | OUTPATIENT
Start: 2019-08-16

## 2019-08-05 RX ORDER — EPINEPHRINE 0.3 MG/.3ML
0.3 INJECTION SUBCUTANEOUS EVERY 5 MIN PRN
Status: CANCELLED | OUTPATIENT
Start: 2019-08-13

## 2019-08-05 RX ORDER — SODIUM CHLORIDE 9 MG/ML
1000 INJECTION, SOLUTION INTRAVENOUS CONTINUOUS PRN
Status: CANCELLED
Start: 2019-08-09

## 2019-08-05 RX ORDER — NALOXONE HYDROCHLORIDE 0.4 MG/ML
.1-.4 INJECTION, SOLUTION INTRAMUSCULAR; INTRAVENOUS; SUBCUTANEOUS
Status: CANCELLED | OUTPATIENT
Start: 2019-08-16

## 2019-08-05 RX ORDER — SODIUM CHLORIDE 9 MG/ML
1000 INJECTION, SOLUTION INTRAVENOUS CONTINUOUS PRN
Status: CANCELLED
Start: 2019-08-06

## 2019-08-05 RX ORDER — LORAZEPAM 2 MG/ML
0.5 INJECTION INTRAMUSCULAR EVERY 4 HOURS PRN
Status: CANCELLED
Start: 2019-08-16

## 2019-08-05 RX ORDER — EPINEPHRINE 1 MG/ML
0.3 INJECTION, SOLUTION, CONCENTRATE INTRAVENOUS EVERY 5 MIN PRN
Status: CANCELLED | OUTPATIENT
Start: 2019-08-13

## 2019-08-05 RX ORDER — LORAZEPAM 2 MG/ML
0.5 INJECTION INTRAMUSCULAR EVERY 4 HOURS PRN
Status: CANCELLED
Start: 2019-08-13

## 2019-08-05 RX ORDER — SODIUM CHLORIDE 9 MG/ML
1000 INJECTION, SOLUTION INTRAVENOUS CONTINUOUS PRN
Status: CANCELLED
Start: 2019-08-13

## 2019-08-05 RX ORDER — MEPERIDINE HYDROCHLORIDE 25 MG/ML
25 INJECTION INTRAMUSCULAR; INTRAVENOUS; SUBCUTANEOUS EVERY 30 MIN PRN
Status: CANCELLED | OUTPATIENT
Start: 2019-08-13

## 2019-08-05 RX ORDER — EPINEPHRINE 1 MG/ML
0.3 INJECTION, SOLUTION, CONCENTRATE INTRAVENOUS EVERY 5 MIN PRN
Status: CANCELLED | OUTPATIENT
Start: 2019-08-16

## 2019-08-05 RX ORDER — METHYLPREDNISOLONE SODIUM SUCCINATE 125 MG/2ML
125 INJECTION, POWDER, LYOPHILIZED, FOR SOLUTION INTRAMUSCULAR; INTRAVENOUS
Status: CANCELLED
Start: 2019-08-06

## 2019-08-05 RX ORDER — MEPERIDINE HYDROCHLORIDE 25 MG/ML
25 INJECTION INTRAMUSCULAR; INTRAVENOUS; SUBCUTANEOUS EVERY 30 MIN PRN
Status: CANCELLED | OUTPATIENT
Start: 2019-08-09

## 2019-08-05 NOTE — PROGRESS NOTES
Oncology Follow-up Visit:  August 6, 2019    Reason for Visit:  Patient presents with:  RECHECK: Follow up Multiple myeloma not having achieved remission     Nursing Note and documentation reviewed: yes    HPI:   This is a 68-year-old female patient who presents to the oncology clinic today for evaluation prior to receiving cycle 3 chemotherapy for Stage II-RISS IgG multiple myeloma diagnosed June 2019.  She is currently undergoing treatment with Velcade, dexamethasone.      She presents to the clinic today telling me she is tolerating the chemotherapy well.  She does have some mild nausea and increased fatigue for about a day and a half after the chemotherapy and also experiences loose stools.  Had been experiencing some discomfort to her low abdomen and since discontinuing milk and changing to almond milk, she has had no further issues.  She denies any blood in her stool.  She is keeping up on her water intake and trying to keep up good nutrition.  She does state that her low back pain is improved.  She complains of a new itchy rash that occurred about 2 to 3 days ago to her back and abdomen and also experienced it to the back of both of her legs.  She denies any use of new soaps, lotions, shampoos, dryer sheets or laundry detergent or any new supplements.    Oncologic History:     12/31/2018   patient presented to the emergency room with vertigo and fatigue.  CT scan of the head was negative and subsequent stress test was negative.  5/3/2019  She was seen by her PCP who ordered lab work and noted a total protein of 12.9.  SPEP at that time showed an M spike of 6.2 with a large monoclonal protein seen in the gamma fraction.  Urine immunofixation showed a possible small protein band in the gamma fraction  5/31/2019  she was evaluated by Dr. Walker with Medical Oncology with plan to rule out myeloma and obtain bone marrow aspiration biopsy as well as a metastatic bone survey along with additional  labwork  6/18/2019 she underwent bone marrow aspiration and biopsy  6/24/2019  She was seen again by Dr. Walker and CBC showed a hemoglobin of 9.3, M spike 7.3 with monoclonal IgG immunoglobulin of kappa light chain type; serum viscosity was 2.9; quantitative immunoglobulins showed an IgG of 8160, beta-2 microglobulin was 5.8, BUN was 21 with creatinine is 0.8 and total protein was 13.7.  Quantitative kappa/lambda free light chains showed an elevated ratio of 17.0 bone marrow aspiration biopsy showed plasma cell myeloma with approximately 80% plasma cells.  Immunofixation showed IgG kappa and flow cytometry revealed kappa monotypic plasma cells consistent with clonal plasma cell neoplasm and FISH panel was pending at that time.  It was felt she had at least stage II disease based on her beta-2 microglobulin and anemia.  Plan was to treat with Velcade 1.3 mg per metered squared days 1, 4, 8 and 11/Decadron 40 mg on days 1, 8 and 15 initiation of Revlimid with the second cycle at 25 mg daily days 1 through 14.  Plan was to also obtain an MRI of the lumbar spine to rule out lytic lesion at L3.  She was initiated on Zovirax 400 mg p.o. twice daily.  6/25/2019  1st cycle of chemotherapy initiated  7/1/2019 note in chart regarding patient's large co-pay for the Revlimid and no plan at this point to initiate Revlimid and treat only with Velcade and Decadron per Dr. Walker  7/11/2019  MRI lumbar spine shows a pathologic superior endplate compression fracture at L3 without evidence of retropulsed fragment and innumerable enhancing lesions throughout the lumbar spine consistent with history of multiple myeloma.        Current Chemo Regime/TX: Velcade 1.3 mg per metered squared on days 1, 4, 8 and 11 with Decadron 40 mg on days 1, 8 and 15     **Zometa 4mg every 3 months  Current Cycle: 3  # of completed cycles:  2     Previous treatment:  n/a    Past Medical History:   Diagnosis Date     Arthritis      Depressive disorder  "     Essential hypertension 10/1/2015     Major depressive disorder, recurrent episode, mild (H) 10/1/2015     Mixed hyperlipidemia 10/1/2015     Multiple myeloma not having achieved remission (H) 6/24/2019     Other specified disorders of bladder 07/09/2012    Irritable Bladder     Seasonal allergies 10/1/2015     Unspecified essential hypertension 03/19/2007     Unspecified sinusitis (chronic) 09/05/2007       Past Surgical History:   Procedure Laterality Date     APPENDECTOMY       BONE MARROW BIOPSY, BONE SPECIMEN, NEEDLE/TROCAR N/A 6/18/2019    Procedure: BONE MARROW BIOPSY;  Surgeon: Maciej Sanz MD;  Location: [facility]     CHOLECYSTECTOMY       COLONOSCOPY  07-    repeat 10 years     COLONOSCOPY N/A 12/30/2016    Procedure: COLONOSCOPY;  Surgeon: Bhaskar Franklin DO;  Location: [facility]     SINUS SURGERY       TUBAL STERILIZATION         Family History   Problem Relation Age of Onset     Breast Cancer Mother 66        Cause of Death     Parkinsonism Father         \"Possible\"     Coronary Artery Disease Father      Pacemaker Father      Thyroid Disease Daughter      Diabetes No family hx of      Hypertension No family hx of      Hyperlipidemia No family hx of      Cerebrovascular Disease No family hx of      Colon Cancer No family hx of      Prostate Cancer No family hx of      Genetic Disorder No family hx of      Asthma No family hx of      Anesthesia Reaction No family hx of        Social History     Socioeconomic History     Marital status:      Spouse name: Not on file     Number of children: Not on file     Years of education: Not on file     Highest education level: Not on file   Occupational History     Occupation: Financial     Comment:  - ([employer])   Social Needs     Financial resource strain: Not on file     Food insecurity:     Worry: Not on file     Inability: Not on file     Transportation needs:     Medical: Not on file     Non-medical: Not on file   Tobacco Use     " Smoking status: Never Smoker     Smokeless tobacco: Never Used     Tobacco comment: Tried to Quit (YES); QUIT in 1971; Passive Exposure (NO)   Substance and Sexual Activity     Alcohol use: Yes     Comment: RARELY     Drug use: No     Sexual activity: Yes     Partners: Male     Birth control/protection: None   Lifestyle     Physical activity:     Days per week: Not on file     Minutes per session: Not on file     Stress: Not on file   Relationships     Social connections:     Talks on phone: Not on file     Gets together: Not on file     Attends Spiritism service: Not on file     Active member of club or organization: Not on file     Attends meetings of clubs or organizations: Not on file     Relationship status: Not on file     Intimate partner violence:     Fear of current or ex partner: Not on file     Emotionally abused: Not on file     Physically abused: Not on file     Forced sexual activity: Not on file   Other Topics Concern      Service Not Asked     Blood Transfusions Not Asked     Caffeine Concern Yes     Comment: Coffee >6 cups daily     Occupational Exposure Not Asked     Hobby Hazards Not Asked     Sleep Concern Not Asked     Stress Concern Not Asked     Weight Concern Not Asked     Special Diet Not Asked     Back Care Not Asked     Exercise Not Asked     Bike Helmet Not Asked     Seat Belt Not Asked     Self-Exams Not Asked     Parent/sibling w/ CABG, MI or angioplasty before 65F 55M? No   Social History Narrative     Not on file       Current Outpatient Medications   Medication     acyclovir (ZOVIRAX) 400 MG tablet     atorvastatin (LIPITOR) 10 MG tablet     citalopram (CELEXA) 20 MG tablet     dexamethasone (DECADRON) 4 MG tablet     losartan (COZAAR) 50 MG tablet     prochlorperazine (COMPAZINE) 10 MG tablet     EPINEPHrine (EPIPEN) 0.3 MG/0.3ML injection     fluticasone (FLONASE) 50 MCG/ACT nasal spray     LORazepam (ATIVAN) 0.5 MG tablet     No current facility-administered medications  "for this visit.         Allergies   Allergen Reactions     Lisinopril Cough     Phenylephrine Hcl Other (See Comments)     **Entex  HEADACHE (SEVERE)     Phenylpropanolamine Other (See Comments)     **Entex  HEADACHE (SEVERE)     Pseudoephedrine Tannate Other (See Comments)     **Entex  HEADACHE (SEVERE)     Levofloxacin Rash     **Levaquin     Moxifloxacin Hcl [Avelox] Rash       Review Of Systems:  Constitutional:    denies fever, weight changes, chills, and night sweats.  Eyes:    denies blurred or double vision  Ears/Nose/Throat:   denies ear pain, nose problems, difficulty swallowing  Respiratory:  Has some SOB with activity, denies cough   Skin:   See hPI  Cardiovascular:   denies chest pain, palpitations, edema  Gastrointestinal:   See HPI  Genitourinary:   denies difficulty with urination, blood in urine  Musculoskeletal:    denies new muscle pain, bone pain  Neurologic:   denies lightheadedness, headaches, numbness or tingling  Psychiatric:   denies anxiety, depression-doing okay  Hematologic/Lymphatic/Immunologic:   denies easy bruising, easy bleeding, lumps or bumps noted  Endocrine:   Denies increased thirst      ECOG Performance Status: 1    Physical Exam:  /70   Pulse 94   Temp 98.7  F (37.1  C) (Tympanic)   Resp 18   Ht 1.6 m (5' 3\")   Wt 69.6 kg (153 lb 6.4 oz)   SpO2 98%   BMI 27.17 kg/m      GENERAL APPEARANCE: Healthy, alert and in no acute distress.  HEENT: Normocephalic, Sclerae anicteric. Oropharynx without ulcers, lesions, or thrush.  NECK:   No asymmetry or masses, no thyromegaly.  LYMPHATICS: No palpable cervical, supraclavicular, axillary, or inguinal nodes   RESP: Lungs clear to auscultation bilaterally, respirations regular and easy  CARDIOVASCULAR: Regular rate and rhythm. Normal S1, S2; no murmur, gallop, or rub.  ABDOMEN: Soft, nontender. Bowel sounds auscultated all 4 quadrants. No palpable organomegaly or masses.  MUSCULOSKELETAL: Extremities without gross deformities " noted. No edema of bilateral lower extremities.  SKIN: fine maculopapular rash to her low back and few areas to her lower abdomen  NEURO: Alert and oriented x 3.  Gait steady.  PSYCHIATRIC: Mentation and affect appear normal.  Mood appropriate.    Laboratory:  Component Value Flag Ref Range Units Status Collected Lab   WBC 6.9   4.0 - 11.0 10e9/L Final 08/06/2019  2:07 PM [facility]   RBC Count 2.89  Low   3.8 - 5.2 10e12/L Final 08/06/2019  2:07 PM [facility]   Hemoglobin 9.1  Low   11.7 - 15.7 g/dL Final 08/06/2019  2:07 PM [facility]   Hematocrit 27.6  Low   35.0 - 47.0 % Final 08/06/2019  2:07 PM [facility]   MCV 96   78 - 100 fl Final 08/06/2019  2:07 PM [facility]   MCH 31.5   26.5 - 33.0 pg Final 08/06/2019  2:07 PM [facility]   MCHC 33.0   31.5 - 36.5 g/dL Final 08/06/2019  2:07 PM [facility]   RDW 16.7  High   10.0 - 15.0 % Final 08/06/2019  2:07 PM [facility]   Platelet Count 339   150 - 450 10e9/L Final 08/06/2019  2:07 PM [facility]   Diff Method     Final 08/06/2019  2:07 PM [facility]   Automated Method    % Neutrophils 42.2    % Final 08/06/2019  2:07 PM [facility]   % Lymphocytes 32.8    % Final 08/06/2019  2:07 PM [facility]   % Monocytes 16.5    % Final 08/06/2019  2:07 PM [facility]   % Eosinophils 3.6    % Final 08/06/2019  2:07 PM [facility]   % Basophils 0.4    % Final 08/06/2019  2:07 PM [facility]   % Immature Granulocytes 4.5    % Final 08/06/2019  2:07 PM [facility]   Nucleated RBCs 0   0 /100 Final 08/06/2019  2:07 PM [facility]   Absolute Neutrophil 2.9   1.6 - 8.3 10e9/L Final 08/06/2019  2:07 PM [facility]   Absolute Lymphocytes 2.3   0.8 - 5.3 10e9/L Final 08/06/2019  2:07 PM [facility]   Absolute Monocytes 1.1   0.0 - 1.3 10e9/L Final 08/06/2019  2:07 PM [facility]   Absolute Eosinophils 0.3   0.0 - 0.7 10e9/L Final 08/06/2019  2:07 PM [facility]   Absolute Basophils 0.0   0.0 - 0.2 10e9/L Final 08/06/2019  2:07 PM [facility]   Abs Immature Granulocytes 0.3   0 - 0.4 10e9/L Final 08/06/2019  2:07 PM [facility]   Absolute Nucleated RBC 0.0     Final 08/06/2019  2:07 PM [facility]     Exam Date Exam Time Accession # Results    8/6/19  2:07 PM [id number]    Component  Value Flag Ref Range Units Status Collected Lab   Sodium 136   133 - 144 mmol/L Final 08/06/2019  2:07 PM [facility]   Potassium 3.8   3.4 - 5.3 mmol/L Final 08/06/2019  2:07 PM [facility]   Chloride 104   94 - 109 mmol/L Final 08/06/2019  2:07 PM [facility]   Carbon Dioxide 28   20 - 32 mmol/L Final 08/06/2019  2:07 PM [facility]   Anion Gap 4   3 - 14 mmol/L Final 08/06/2019  2:07 PM [facility]   Glucose 148  High   70 - 99 mg/dL Final 08/06/2019  2:07 PM [facility]   Urea Nitrogen 11   7 - 30 mg/dL Final 08/06/2019  2:07 PM [facility]   Creatinine 0.75   0.52 - 1.04 mg/dL Final 08/06/2019  2:07 PM [facility]   GFR Estimate 81   >60 mL/min/ Final 08/06/2019  2:07 PM [facility]   Comment:   Non  GFR Calc   Starting 12/18/2018, serum creatinine based estimated GFR (eGFR) will be   calculated using the Chronic Kidney Disease Epidemiology Collaboration   (CKD-EPI) equation.    GFR Estimate If Black >90   >60 mL/min/ Final 08/06/2019  2:07 PM [facility]   Comment:    GFR Calc   Starting 12/18/2018, serum creatinine based estimated GFR (eGFR) will be   calculated using the Chronic Kidney Disease Epidemiology Collaboration   (CKD-EPI) equation.    Calcium 8.1  Low   8.5 - 10.1 mg/dL Final 08/06/2019  2:07 PM [facility]   Bilirubin Total 0.3   0.2 - 1.3 mg/dL Final 08/06/2019  2:07 PM [facility]   Albumin 2.9  Low   3.4 - 5.0 g/dL Final 08/06/2019  2:07 PM [facility]   Protein Total 10.1  High   6.8 - 8.8 g/dL Final 08/06/2019  2:07 PM [facility]   Alkaline Phosphatase 102   40 - 150 U/L Final 08/06/2019  2:07 PM [facility]   ALT 30   0 - 50 U/L Final 08/06/2019  2:07 PM [facility]   AST 23   0 - 45 U/L Final 08/06/2019  2:07 PM [facility]       Results for orders placed or performed in visit on 07/30/19   Beta 2 microglobulin   Result Value Ref Range    Beta-2-Microglobulin 3.8 (H) <2.3 mg/L   ELP reflex to IELP   Result Value Ref Range    Albumin Fraction 3.6 (L) 3.7 - 5.1 g/dL    Alpha 1 Fraction 0.4 0.2 - 0.4 g/dL    Alpha 2 Fraction 0.6 0.5 - 0.9 g/dL    Beta Fraction 0.6 0.6 - 1.0 g/dL    Gamma Fraction 4.3 (H) 0.7 -  1.6 g/dL    Monoclonal Peak 4.2 (H) 0.0 g/dL    ELP Interpretation:       Large monoclonal protein (4.2 g/dL) seen in the gamma fraction. See immunofixation report   on same specimen. Slight hypoalbuminemia. Pathologic significance requires clinical   correlation.  Basim Tomlinson M.D., Ph.D., Pathologist.      Immunoglobulins A G and M   Result Value Ref Range    IGG 5,090 (H) 695 - 1,620 mg/dL    IGA 8 (L) 70 - 380 mg/dL    IGM 8 (L) 60 - 265 mg/dL   [place]/Lambda Free Light Chains Quantitative   Result Value Ref Range    Kappa Free Light Chains 1.16 0.33 - 1.94 mg/dL    LAMBDA FREE LT CHAINS <0.16 (L) 0.57 - 2.63 mg/dL    KAPPA/LAMBDA RATIO SEE NOTE 0.26 - 1.65   Macroglobulins   Result Value Ref Range    Viscosity Index 2.1 (H) 1.4 - 1.8   Immunofixation ELP   Result Value Ref Range    Immunofixation ELP (Note)      Component Value Flag Ref Range Units Status Collected Lab   Kappa Free Light Chains 1.16   0.33 - 1.94 mg/dL Final 07/30/2019  1:38    LAMBDA FREE LT CHAINS <0.16  Low   0.57 - 2.63 mg/dL Final 07/30/2019  1:38    KAPPA/LAMBDA RATIO SEE NOTE   0.26 - 1.65  Final 07/30/2019  1:38    Comment:   (Note)   Unable to calculate ratio due to undetectable concentration   of free kappa and/or free lambda light chains.   Performed by [facility],   [address] [place] [id number] [phone]   [email or web address] Lalito Aguirre MD, Lab. Director        Imaging Studies:  None completed for today's visit      ASSESSMENT/PLAN:    #1 Multiple myeloma:   IgG kappa multiple myeloma with 80% involvement of the bone marrow diagnosed June 2019 and currently undergoing therapy with Velcade and Decadron.  The patient also had evidence of end-organ damage with anemia, elevated serum viscosity, lytic lesion at L3; elevated beta-2 microglobulin, elevated kappa lambda ratio.  M spike has decreased along with beta-2 globulin microglobulin and IgG level and ratio was undetectable showing response to  therapy.  She will initiate cycle 3 today and follow-up prior to cycle 4 with myeloma labs.      #2 rash: Unsure of etiology.  She will take her 40 mg dose of dexamethasone today so we will see if this helps improve the rash.  I asked that she call with any worsening or no improvement in the rash.    I encouraged patient to call with any questions or concerns.       Barbie ROSSI, FNP-BC, AOCNP

## 2019-08-06 ENCOUNTER — INFUSION THERAPY VISIT (OUTPATIENT)
Dept: INFUSION THERAPY | Facility: OTHER | Age: 69
End: 2019-08-06
Attending: INTERNAL MEDICINE
Payer: MEDICARE

## 2019-08-06 ENCOUNTER — APPOINTMENT (OUTPATIENT)
Dept: LAB | Facility: OTHER | Age: 69
End: 2019-08-06
Attending: INTERNAL MEDICINE
Payer: MEDICARE

## 2019-08-06 ENCOUNTER — ONCOLOGY VISIT (OUTPATIENT)
Dept: ONCOLOGY | Facility: OTHER | Age: 69
End: 2019-08-06
Attending: NURSE PRACTITIONER
Payer: MEDICARE

## 2019-08-06 VITALS
BODY MASS INDEX: 27.18 KG/M2 | DIASTOLIC BLOOD PRESSURE: 74 MMHG | HEIGHT: 63 IN | WEIGHT: 153.4 LBS | HEART RATE: 78 BPM | TEMPERATURE: 98.5 F | RESPIRATION RATE: 18 BRPM | OXYGEN SATURATION: 97 % | SYSTOLIC BLOOD PRESSURE: 134 MMHG

## 2019-08-06 VITALS
WEIGHT: 153.4 LBS | BODY MASS INDEX: 27.18 KG/M2 | OXYGEN SATURATION: 98 % | SYSTOLIC BLOOD PRESSURE: 120 MMHG | HEIGHT: 63 IN | RESPIRATION RATE: 18 BRPM | TEMPERATURE: 98.7 F | HEART RATE: 94 BPM | DIASTOLIC BLOOD PRESSURE: 70 MMHG

## 2019-08-06 DIAGNOSIS — C90.00 MULTIPLE MYELOMA NOT HAVING ACHIEVED REMISSION (H): Primary | ICD-10-CM

## 2019-08-06 LAB
ALBUMIN SERPL-MCNC: 2.9 G/DL (ref 3.4–5)
ALP SERPL-CCNC: 102 U/L (ref 40–150)
ALT SERPL W P-5'-P-CCNC: 30 U/L (ref 0–50)
ANION GAP SERPL CALCULATED.3IONS-SCNC: 4 MMOL/L (ref 3–14)
AST SERPL W P-5'-P-CCNC: 23 U/L (ref 0–45)
BASOPHILS # BLD AUTO: 0 10E9/L (ref 0–0.2)
BASOPHILS NFR BLD AUTO: 0.4 %
BILIRUB SERPL-MCNC: 0.3 MG/DL (ref 0.2–1.3)
BUN SERPL-MCNC: 11 MG/DL (ref 7–30)
CALCIUM SERPL-MCNC: 8.1 MG/DL (ref 8.5–10.1)
CHLORIDE SERPL-SCNC: 104 MMOL/L (ref 94–109)
CO2 SERPL-SCNC: 28 MMOL/L (ref 20–32)
CREAT SERPL-MCNC: 0.75 MG/DL (ref 0.52–1.04)
DIFFERENTIAL METHOD BLD: ABNORMAL
EOSINOPHIL # BLD AUTO: 0.3 10E9/L (ref 0–0.7)
EOSINOPHIL NFR BLD AUTO: 3.6 %
ERYTHROCYTE [DISTWIDTH] IN BLOOD BY AUTOMATED COUNT: 16.7 % (ref 10–15)
GFR SERPL CREATININE-BSD FRML MDRD: 81 ML/MIN/{1.73_M2}
GLUCOSE SERPL-MCNC: 148 MG/DL (ref 70–99)
HCT VFR BLD AUTO: 27.6 % (ref 35–47)
HGB BLD-MCNC: 9.1 G/DL (ref 11.7–15.7)
IMM GRANULOCYTES # BLD: 0.3 10E9/L (ref 0–0.4)
IMM GRANULOCYTES NFR BLD: 4.5 %
LYMPHOCYTES # BLD AUTO: 2.3 10E9/L (ref 0.8–5.3)
LYMPHOCYTES NFR BLD AUTO: 32.8 %
MCH RBC QN AUTO: 31.5 PG (ref 26.5–33)
MCHC RBC AUTO-ENTMCNC: 33 G/DL (ref 31.5–36.5)
MCV RBC AUTO: 96 FL (ref 78–100)
MONOCYTES # BLD AUTO: 1.1 10E9/L (ref 0–1.3)
MONOCYTES NFR BLD AUTO: 16.5 %
NEUTROPHILS # BLD AUTO: 2.9 10E9/L (ref 1.6–8.3)
NEUTROPHILS NFR BLD AUTO: 42.2 %
NRBC # BLD AUTO: 0 10*3/UL
NRBC BLD AUTO-RTO: 0 /100
PLATELET # BLD AUTO: 339 10E9/L (ref 150–450)
POTASSIUM SERPL-SCNC: 3.8 MMOL/L (ref 3.4–5.3)
PROT SERPL-MCNC: 10.1 G/DL (ref 6.8–8.8)
RBC # BLD AUTO: 2.89 10E12/L (ref 3.8–5.2)
SODIUM SERPL-SCNC: 136 MMOL/L (ref 133–144)
WBC # BLD AUTO: 6.9 10E9/L (ref 4–11)

## 2019-08-06 PROCEDURE — 99214 OFFICE O/P EST MOD 30 MIN: CPT | Performed by: NURSE PRACTITIONER

## 2019-08-06 PROCEDURE — G0463 HOSPITAL OUTPT CLINIC VISIT: HCPCS | Mod: 25

## 2019-08-06 PROCEDURE — 85025 COMPLETE CBC W/AUTO DIFF WBC: CPT | Mod: ZL | Performed by: NURSE PRACTITIONER

## 2019-08-06 PROCEDURE — 36415 COLL VENOUS BLD VENIPUNCTURE: CPT | Mod: ZL | Performed by: NURSE PRACTITIONER

## 2019-08-06 PROCEDURE — 80053 COMPREHEN METABOLIC PANEL: CPT | Mod: ZL | Performed by: NURSE PRACTITIONER

## 2019-08-06 PROCEDURE — 96401 CHEMO ANTI-NEOPL SQ/IM: CPT

## 2019-08-06 PROCEDURE — 25000128 H RX IP 250 OP 636: Mod: JW | Performed by: NURSE PRACTITIONER

## 2019-08-06 PROCEDURE — G0463 HOSPITAL OUTPT CLINIC VISIT: HCPCS

## 2019-08-06 RX ADMIN — BORTEZOMIB 2.3 MG: 3.5 INJECTION, POWDER, LYOPHILIZED, FOR SOLUTION INTRAVENOUS; SUBCUTANEOUS at 16:06

## 2019-08-06 ASSESSMENT — MIFFLIN-ST. JEOR
SCORE: 1184.95
SCORE: 1184.82

## 2019-08-06 ASSESSMENT — PATIENT HEALTH QUESTIONNAIRE - PHQ9: SUM OF ALL RESPONSES TO PHQ QUESTIONS 1-9: 2

## 2019-08-06 ASSESSMENT — PAIN SCALES - GENERAL: PAINLEVEL: NO PAIN (0)

## 2019-08-06 NOTE — PATIENT INSTRUCTIONS
We would like to see you back per your calendar.     When you are in need of a refill, please call your pharmacy and they will send us a request.     If you have any questions please call [phone]    Other instructions:  none

## 2019-08-06 NOTE — PATIENT INSTRUCTIONS
Patient Education     Bortezomib Solution for injection  What is this medicine?  BORTEZOMIB (bor MARYJO oh mib) is a chemotherapy drug. It slows the growth of cancer cells. This medicine is used to treat multiple myeloma, and certain lymphomas, such as mantle-cell lymphoma.  This medicine may be used for other purposes; ask your health care provider or pharmacist if you have questions.  What should I tell my health care provider before I take this medicine?  They need to know if you have any of these conditions:    diabetes    heart disease    irregular heartbeat    liver disease    on hemodialysis    low blood counts, like low white blood cells, platelets, or hemoglobin    peripheral neuropathy    taking medicine for blood pressure    an unusual or allergic reaction to bortezomib, mannitol, boron, other medicines, foods, dyes, or preservatives    pregnant or trying to get pregnant    breast-feeding  How should I use this medicine?  This medicine is for injection into a vein or for injection under the skin. It is given by a health care professional in a hospital or clinic setting.  Talk to your pediatrician regarding the use of this medicine in children. Special care may be needed.  Overdosage: If you think you have taken too much of this medicine contact a poison control center or emergency room at once.  NOTE: This medicine is only for you. Do not share this medicine with others.  What if I miss a dose?  It is important not to miss your dose. Call your doctor or health care professional if you are unable to keep an appointment.  What may interact with this medicine?  This medicine may interact with the following medications:    ketoconazole    rifampin    ritonavir    Revloc s Wort  This list may not describe all possible interactions. Give your health care provider a list of all the medicines, herbs, non-prescription drugs, or dietary supplements you use. Also tell them if you smoke, drink alcohol, or use illegal  drugs. Some items may interact with your medicine.  What should I watch for while using this medicine?  Visit your doctor for checks on your progress. This drug may make you feel generally unwell. This is not uncommon, as chemotherapy can affect healthy cells as well as cancer cells. Report any side effects. Continue your course of treatment even though you feel ill unless your doctor tells you to stop.  You may get drowsy or dizzy. Do not drive, use machinery, or do anything that needs mental alertness until you know how this medicine affects you. Do not stand or sit up quickly, especially if you are an older patient. This reduces the risk of dizzy or fainting spells.  In some cases, you may be given additional medicines to help with side effects. Follow all directions for their use.  Call your doctor or health care professional for advice if you get a fever, chills or sore throat, or other symptoms of a cold or flu. Do not treat yourself. This drug decreases your body's ability to fight infections. Try to avoid being around people who are sick.  This medicine may increase your risk to bruise or bleed. Call your doctor or health care professional if you notice any unusual bleeding.  You may need blood work done while you are taking this medicine.  In some patients, this medicine may cause a serious brain infection that may cause death. If you have any problems seeing, thinking, speaking, walking, or standing, tell your doctor right away. If you cannot reach your doctor, urgently seek other source of medical care.  Do not become pregnant while taking this medicine. Women should inform their doctor if they wish to become pregnant or think they might be pregnant. There is a potential for serious side effects to an unborn child. Talk to your health care professional or pharmacist for more information. Do not breast-feed an infant while taking this medicine.  Check with your doctor or health care professional if you get  an attack of severe diarrhea, nausea and vomiting, or if you sweat a lot. The loss of too much body fluid can make it dangerous for you to take this medicine.  What side effects may I notice from receiving this medicine?  Side effects that you should report to your doctor or health care professional as soon as possible:    allergic reactions like skin rash, itching or hives, swelling of the face, lips, or tongue    breathing problems    changes in hearing    changes in vision    fast, irregular heartbeat    feeling faint or lightheaded, falls    pain, tingling, numbness in the hands or feet    right upper belly pain    seizures    swelling of the ankles, feet, hands    unusual bleeding or bruising    unusually weak or tired    vomiting    yellowing of the eyes or skin  Side effects that usually do not require medical attention (report to your doctor or health care professional if they continue or are bothersome):    changes in emotions or moods    constipation    diarrhea    loss of appetite    headache    irritation at site where injected    nausea  This list may not describe all possible side effects. Call your doctor for medical advice about side effects. You may report side effects to FDA at 4-924-FDA-9309.  Where should I keep my medicine?  This drug is given in a hospital or clinic and will not be stored at home.  NOTE:This sheet is a summary. It may not cover all possible information. If you have questions about this medicine, talk to your doctor, pharmacist, or health care provider. Copyright  2016 [employer] Standard

## 2019-08-06 NOTE — NURSING NOTE
"Chief Complaint   Patient presents with     RECHECK     Follow up Multiple myeloma not having achieved remission       Initial /70   Pulse 94   Temp 98.7  F (37.1  C) (Tympanic)   Resp 18   Ht 1.6 m (5' 3\")   Wt 69.6 kg (153 lb 6.4 oz)   SpO2 98%   BMI 27.17 kg/m   Estimated body mass index is 27.17 kg/m  as calculated from the following:    Height as of this encounter: 1.6 m (5' 3\").    Weight as of this encounter: 69.6 kg (153 lb 6.4 oz).  Medication Reconciliation: complete.  Immunizations and advance directives status reviewed. Pain scale 0 , PHQ-9 = 2.    Vilma Chi LPN              "

## 2019-08-06 NOTE — PROGRESS NOTES
Patient is a 68 year old female here today for injection of Velcade per order of Dr Walker. Today's labs are within parameters to administer injection; today's lab values are WBC: 6.9, HGB: 9.1, PLT: 339, ANC: 2.9, creatinine clearance: 65.3, Scr: 0.75.  Velcade dose verified with Christine Otoole RN prior to release of drug. Patient denies questions nor concerns regarding medication, administration site, side effects, nor aftercare.  Patient identified with two identifiers, order verified, and verbal consent for today's injection obtained from patient.     Patient education provided on s/s of injection site infection, and/or medication specific side effects, and when to call a provider.  Patient instructed to report any adverse effects.   1606 Velcade administered per protocol in right upper outer arm at 45 degree angle.  Site covered with sterile bandage.  Patient tolerated injection well, no verbal nor non-verbal signs of discomfort noted.  No adverse effects noted at this time.   Patient instructed to call with further questions or concerns.  Patient states understanding and is in agreement with this plan.  Patient declines copy of appointments, and after visit summary (AVS). Patient discharged ambulatory.  Irene Booth RN

## 2019-08-09 ENCOUNTER — INFUSION THERAPY VISIT (OUTPATIENT)
Dept: INFUSION THERAPY | Facility: OTHER | Age: 69
End: 2019-08-09
Attending: INTERNAL MEDICINE
Payer: MEDICARE

## 2019-08-09 VITALS
HEART RATE: 76 BPM | WEIGHT: 154.5 LBS | BODY MASS INDEX: 27.38 KG/M2 | RESPIRATION RATE: 18 BRPM | OXYGEN SATURATION: 97 % | SYSTOLIC BLOOD PRESSURE: 124 MMHG | HEIGHT: 63 IN | DIASTOLIC BLOOD PRESSURE: 81 MMHG | TEMPERATURE: 98.7 F

## 2019-08-09 DIAGNOSIS — C90.00 MULTIPLE MYELOMA NOT HAVING ACHIEVED REMISSION (H): Primary | ICD-10-CM

## 2019-08-09 PROCEDURE — 25000128 H RX IP 250 OP 636: Mod: JW | Performed by: NURSE PRACTITIONER

## 2019-08-09 PROCEDURE — 96401 CHEMO ANTI-NEOPL SQ/IM: CPT

## 2019-08-09 RX ADMIN — BORTEZOMIB 2.3 MG: 3.5 INJECTION, POWDER, LYOPHILIZED, FOR SOLUTION INTRAVENOUS; SUBCUTANEOUS at 12:57

## 2019-08-09 ASSESSMENT — MIFFLIN-ST. JEOR: SCORE: 1189.81

## 2019-08-09 NOTE — PATIENT INSTRUCTIONS
Patient Education     Velcade  Generic Name: Bortezomib  Drug type  Velcade stops the growth of certain cancer cells.  What this drug is used for   Multiple myeloma, mantle cell lymphoma   or _______________________________________.   How this drug is given  This drug is usually given by IV or as a shot under the skin (thigh or stomach). If you receive the medicine by IV, it may leak out of the vein. Your nurses are trained to catch this, but please let them know right away if you think the medicine is leaking. You would feel pain, discomfort, and heat. You might see redness on the skin around the IV.  The amount of medicine that you receive depends on many things. Your doctor will decide on the best dose for you.  Make sure your health care team knows about all the medicines and supplements you take. This will help prevent drug interactions.   Side effects  Most people do not have all the side effects listed. Side effects will usually go away after treatment is complete. Rare side effects are not listed here, so tell your doctor if you have any unusual symptoms.  Common side effects (occur in more than 3 out of 10 people):    Feeling tired and weak    Numbness and tingling in the hands and feet    Loose, watery stools (diarrhea) or hard, dry stools (constipation)    Feeling sick to your stomach (nausea)    Throwing up (vomiting)    Do not feel hungry    Fever    Low platelets (risk of bleeding)    Low red blood cells (anemia)  Less common side effects (occur in less than 3 out of 10 people):    Headache    Trouble sleeping    Joint or bone pain    Muscle cramps    Swelling in the body    Feel dizzy    Rash, Itching    Blurry vision    Cough    Heartburn    Low blood pressure  Call your doctor right away if you have:    Fever of 100.4 F (38 C) or higher and/or chills    Signs of severe allergy: trouble breathing, your throat closes up, swelling of the mouth, face, lips or tongue, or hives (red, itchy spots on the  skin).  Call your doctor within 24 hours if you have:    Nausea so bad that you can't eat or drink and medicine doesn't help    Loose, watery stools 4 to 6 times in 24 hours    Blisters on the skin where IV was inserted    You feel so tired that you can't care for yourself    Vomiting more than 4 to 5 times in 24 hours    Black or tarry stools or blood in your stools    Blood in urine (pee)    Unusual bruising or bleeding    Shortness of breath    Mouth pain    Swelling or redness in just one arm or leg    Swelling of the feet or ankles or sudden weight gain.    Numbness or tingling in the hands or feet  Other information:________________________  _____________________________________  _____________________________________  If you have any side effects, call us. We can help you cope with them.   For more information, see:  www.chemocare.com   www.nlm.nih.gov/medlineplus/druginformation.htm  www.cancer.gov/cancertopics/coping/chemotherapy-and-you  For informational purposes only. Not to replace the advice of your health care provider.   Developed in collaboration with [facility].  Copyright   2014 [employer]. All rights reserved. [employer] 182615 - REV 05/16.  For informational purposes only. Not to replace the advice of your health care provider.  Copyright   2018 [employer]. All rights reserved.

## 2019-08-09 NOTE — PROGRESS NOTES
Patient is a 68 year old female here today accompanied by  for injection of velcade per order of Dr Walker. Velcade dose verified with Julia Anderson RN prior to release of drug. Patient denies questions nor concerns regarding medication, administration site, side effects, nor aftercare.  Patient identified with two identifiers, order verified, and verbal consent for today's injection obtained from patient.     Patient education provided on s/s of injection site infection, and/or medication specific side effects, and when to call a provider.  Patient instructed to report any adverse effects.   1257 Velcade administered per protocol in left upper outer arm at 45 degree angle.  Site covered with sterile bandage.  Patient tolerated injection well, no verbal nor non-verbal signs of discomfort noted.  No adverse effects noted at this time.   Patient instructed to call with further questions or concerns.  Patient states understanding and is in agreement with this plan.  Patient declines copy of appointments, and after visit summary (AVS). Patient discharged ambulatory.  Irene Booth RN

## 2019-08-12 ENCOUNTER — TELEPHONE (OUTPATIENT)
Dept: INFUSION THERAPY | Facility: OTHER | Age: 69
End: 2019-08-12

## 2019-08-12 NOTE — TELEPHONE ENCOUNTER
[facility]    1. In the last 7 days, how OFTEN did you have nausea ? Never      In the last 7 days, what was the SEVERITY of your NAUSEA at its WORST? None     2. In the last 7 days, what was the SEVERITY of your CONSTIPATION at its WORST? None    3. In the last 7 days, how OFTEN did you have PAIN? Rarely      In the last 7 days, what was the SEVERITY of your PAIN at its WORST? Mild      In the last 7 days, how much did PAIN INTERFERE with your usual or daily activities? Not at all    Do you have any other symptoms that you wish to report ? No    Please list any other symptoms:        Note routed to Provider for review No

## 2019-08-13 ENCOUNTER — INFUSION THERAPY VISIT (OUTPATIENT)
Dept: INFUSION THERAPY | Facility: OTHER | Age: 69
End: 2019-08-13
Attending: INTERNAL MEDICINE
Payer: MEDICARE

## 2019-08-13 ENCOUNTER — APPOINTMENT (OUTPATIENT)
Dept: LAB | Facility: OTHER | Age: 69
End: 2019-08-13
Attending: INTERNAL MEDICINE
Payer: MEDICARE

## 2019-08-13 VITALS
OXYGEN SATURATION: 97 % | DIASTOLIC BLOOD PRESSURE: 82 MMHG | WEIGHT: 154.5 LBS | SYSTOLIC BLOOD PRESSURE: 133 MMHG | HEIGHT: 63 IN | TEMPERATURE: 96.8 F | BODY MASS INDEX: 27.38 KG/M2 | HEART RATE: 88 BPM

## 2019-08-13 DIAGNOSIS — C90.00 MULTIPLE MYELOMA NOT HAVING ACHIEVED REMISSION (H): Primary | ICD-10-CM

## 2019-08-13 LAB
BASOPHILS # BLD AUTO: 0 10E9/L (ref 0–0.2)
BASOPHILS NFR BLD AUTO: 0.2 %
DIFFERENTIAL METHOD BLD: ABNORMAL
EOSINOPHIL # BLD AUTO: 0.2 10E9/L (ref 0–0.7)
EOSINOPHIL NFR BLD AUTO: 2.6 %
ERYTHROCYTE [DISTWIDTH] IN BLOOD BY AUTOMATED COUNT: 16.2 % (ref 10–15)
HCT VFR BLD AUTO: 27.8 % (ref 35–47)
HGB BLD-MCNC: 9.1 G/DL (ref 11.7–15.7)
IMM GRANULOCYTES # BLD: 0.3 10E9/L (ref 0–0.4)
IMM GRANULOCYTES NFR BLD: 4.8 %
LYMPHOCYTES # BLD AUTO: 2.4 10E9/L (ref 0.8–5.3)
LYMPHOCYTES NFR BLD AUTO: 42.1 %
MCH RBC QN AUTO: 30.8 PG (ref 26.5–33)
MCHC RBC AUTO-ENTMCNC: 32.7 G/DL (ref 31.5–36.5)
MCV RBC AUTO: 94 FL (ref 78–100)
MONOCYTES # BLD AUTO: 0.8 10E9/L (ref 0–1.3)
MONOCYTES NFR BLD AUTO: 14.5 %
NEUTROPHILS # BLD AUTO: 2.1 10E9/L (ref 1.6–8.3)
NEUTROPHILS NFR BLD AUTO: 35.8 %
NRBC # BLD AUTO: 0 10*3/UL
NRBC BLD AUTO-RTO: 0 /100
PLATELET # BLD AUTO: 208 10E9/L (ref 150–450)
RBC # BLD AUTO: 2.95 10E12/L (ref 3.8–5.2)
WBC # BLD AUTO: 5.8 10E9/L (ref 4–11)

## 2019-08-13 PROCEDURE — 85025 COMPLETE CBC W/AUTO DIFF WBC: CPT | Mod: ZL | Performed by: NURSE PRACTITIONER

## 2019-08-13 PROCEDURE — 96401 CHEMO ANTI-NEOPL SQ/IM: CPT

## 2019-08-13 PROCEDURE — 36415 COLL VENOUS BLD VENIPUNCTURE: CPT | Mod: ZL | Performed by: NURSE PRACTITIONER

## 2019-08-13 PROCEDURE — 25000128 H RX IP 250 OP 636: Performed by: NURSE PRACTITIONER

## 2019-08-13 RX ADMIN — BORTEZOMIB 2.3 MG: 3.5 INJECTION, POWDER, LYOPHILIZED, FOR SOLUTION INTRAVENOUS; SUBCUTANEOUS at 14:39

## 2019-08-13 ASSESSMENT — MIFFLIN-ST. JEOR: SCORE: 1189.81

## 2019-08-13 NOTE — PROGRESS NOTES
Patient is a 68 year old female here today for injection of Velcade per order of . Patient meets parameters for today's infusion. Patient identified with two identifiers, order verified, and verbal consent for today's infusion obtained from patient. Written consent for treatment is on file and valid.    Today lab values: WBC: 5.8, HGB: 9.1, PLT: 208  ANC: 2.1. Patient meets order parameters for today's treatment.    Patient had questions about emotional and health related questions from family and friends, and how to manage them when she is feeling overwhelmed by them. Lengthy conversation with patient about importance of putting herself and her  first, ok to not answer questions when she feels uncomfortable, and how to politely redirect people. Encouraged healthy open discussions with those she trusts, as well as chemo/onc staff when having questions, concerns, etc. At end of conversation, patient denied any further needs or questions. She is in good spirits.     Patient denies questions or concerns regarding injection and/or medication(s) being administered.    Independent dose check completed with Staci Tompkins RN.    Velcade injected SQ into right upper outer arm at 45 degree angle. Patient educated on importance of rotating to abdomen  per protocol rotating sites.     Injection administered per protocol. Patient tolerated infusion without incident, no signs or symptoms of adverse reaction noted. Patient denies pain or discomfort.     Covered with a sterile bandage. Pt instructed to leave bandage intact for a minimum of one hour, and to call with questions or concerns. Patient states understanding, discharged ambulatory.

## 2019-08-16 ENCOUNTER — INFUSION THERAPY VISIT (OUTPATIENT)
Dept: INFUSION THERAPY | Facility: OTHER | Age: 69
End: 2019-08-16
Attending: INTERNAL MEDICINE
Payer: MEDICARE

## 2019-08-16 VITALS
BODY MASS INDEX: 27.14 KG/M2 | WEIGHT: 153.2 LBS | HEIGHT: 63 IN | TEMPERATURE: 98.4 F | SYSTOLIC BLOOD PRESSURE: 105 MMHG | DIASTOLIC BLOOD PRESSURE: 67 MMHG | HEART RATE: 93 BPM | OXYGEN SATURATION: 97 %

## 2019-08-16 DIAGNOSIS — C90.00 MULTIPLE MYELOMA NOT HAVING ACHIEVED REMISSION (H): Primary | ICD-10-CM

## 2019-08-16 PROCEDURE — 96401 CHEMO ANTI-NEOPL SQ/IM: CPT

## 2019-08-16 PROCEDURE — 25000128 H RX IP 250 OP 636: Performed by: NURSE PRACTITIONER

## 2019-08-16 RX ADMIN — BORTEZOMIB 2.3 MG: 3.5 INJECTION, POWDER, LYOPHILIZED, FOR SOLUTION INTRAVENOUS; SUBCUTANEOUS at 14:01

## 2019-08-16 ASSESSMENT — MIFFLIN-ST. JEOR: SCORE: 1183.91

## 2019-08-16 NOTE — PROGRESS NOTES
Patient is a 68 year old female here today for injection of Velcade per order of . Patient identified with two identifiers, order verified, and verbal consent for today's infusion obtained from patient. Written consent for treatment is on file and valid.    Patient denies questions or concerns regarding injection and/or medication(s) being administered.  Velcade injected SQ into left upper outer SQ per protocol rotating sites.     Injection administered per protocol. Patient tolerated infusion without incident, no signs or symptoms of adverse reaction noted. Patient denies pain or discomfort.     Covered with a sterile bandage. Pt instructed to leave bandage intact for a minimum of one hour, and to call with questions or concerns. Copy of appointments, discharge instructions, and after visit summary (AVS) provided to patient. Patient states understanding, discharged ambulatory.

## 2019-08-20 DIAGNOSIS — C90.00 MULTIPLE MYELOMA NOT HAVING ACHIEVED REMISSION (H): ICD-10-CM

## 2019-08-20 PROCEDURE — 82784 ASSAY IGA/IGD/IGG/IGM EACH: CPT | Mod: ZL | Performed by: NURSE PRACTITIONER

## 2019-08-20 PROCEDURE — 00000402 ZZHCL STATISTIC TOTAL PROTEIN: Mod: ZL | Performed by: NURSE PRACTITIONER

## 2019-08-20 PROCEDURE — 86334 IMMUNOFIX E-PHORESIS SERUM: CPT | Mod: ZL | Performed by: NURSE PRACTITIONER

## 2019-08-20 PROCEDURE — 84165 PROTEIN E-PHORESIS SERUM: CPT | Mod: ZL | Performed by: NURSE PRACTITIONER

## 2019-08-20 PROCEDURE — 99000 SPECIMEN HANDLING OFFICE-LAB: CPT | Performed by: NURSE PRACTITIONER

## 2019-08-20 PROCEDURE — 82232 ASSAY OF BETA-2 PROTEIN: CPT | Mod: ZL | Performed by: NURSE PRACTITIONER

## 2019-08-20 PROCEDURE — 85810 BLOOD VISCOSITY EXAMINATION: CPT | Mod: ZL | Performed by: NURSE PRACTITIONER

## 2019-08-20 PROCEDURE — 36415 COLL VENOUS BLD VENIPUNCTURE: CPT | Mod: ZL | Performed by: NURSE PRACTITIONER

## 2019-08-21 LAB
B2 MICROGLOB SERPL-MCNC: 5.2 MG/L
IGA SERPL-MCNC: <7 MG/DL (ref 70–380)
IGG SERPL-MCNC: 4820 MG/DL (ref 695–1620)
IGM SERPL-MCNC: 8 MG/DL (ref 60–265)
VISC SER: 2 CP (ref 1.4–1.8)

## 2019-08-22 LAB
ALBUMIN SERPL ELPH-MCNC: 3.8 G/DL (ref 3.7–5.1)
ALPHA1 GLOB SERPL ELPH-MCNC: 0.4 G/DL (ref 0.2–0.4)
ALPHA2 GLOB SERPL ELPH-MCNC: 0.7 G/DL (ref 0.5–0.9)
B-GLOBULIN SERPL ELPH-MCNC: 0.6 G/DL (ref 0.6–1)
GAMMA GLOB SERPL ELPH-MCNC: 4.1 G/DL (ref 0.7–1.6)
M PROTEIN SERPL ELPH-MCNC: 3.9 G/DL
PROT PATTERN SERPL ELPH-IMP: ABNORMAL
PROT PATTERN SERPL IFE-IMP: NORMAL

## 2019-08-27 ENCOUNTER — ONCOLOGY VISIT (OUTPATIENT)
Dept: ONCOLOGY | Facility: OTHER | Age: 69
End: 2019-08-27
Attending: NURSE PRACTITIONER
Payer: MEDICARE

## 2019-08-27 ENCOUNTER — INFUSION THERAPY VISIT (OUTPATIENT)
Dept: INFUSION THERAPY | Facility: OTHER | Age: 69
End: 2019-08-27
Attending: INTERNAL MEDICINE
Payer: MEDICARE

## 2019-08-27 ENCOUNTER — APPOINTMENT (OUTPATIENT)
Dept: LAB | Facility: OTHER | Age: 69
End: 2019-08-27
Attending: INTERNAL MEDICINE
Payer: MEDICARE

## 2019-08-27 VITALS
SYSTOLIC BLOOD PRESSURE: 152 MMHG | HEIGHT: 63 IN | BODY MASS INDEX: 27.18 KG/M2 | HEART RATE: 81 BPM | TEMPERATURE: 96.4 F | DIASTOLIC BLOOD PRESSURE: 81 MMHG | WEIGHT: 153.4 LBS | OXYGEN SATURATION: 98 %

## 2019-08-27 VITALS
TEMPERATURE: 98.7 F | HEIGHT: 63 IN | BODY MASS INDEX: 27.18 KG/M2 | SYSTOLIC BLOOD PRESSURE: 112 MMHG | HEART RATE: 89 BPM | OXYGEN SATURATION: 94 % | WEIGHT: 153.4 LBS | DIASTOLIC BLOOD PRESSURE: 62 MMHG

## 2019-08-27 DIAGNOSIS — C90.00 MULTIPLE MYELOMA NOT HAVING ACHIEVED REMISSION (H): Primary | ICD-10-CM

## 2019-08-27 DIAGNOSIS — K59.00 CONSTIPATION, UNSPECIFIED CONSTIPATION TYPE: ICD-10-CM

## 2019-08-27 DIAGNOSIS — M89.9 BONE LESION: ICD-10-CM

## 2019-08-27 LAB
ALBUMIN SERPL-MCNC: 2.9 G/DL (ref 3.4–5)
ALP SERPL-CCNC: 86 U/L (ref 40–150)
ALT SERPL W P-5'-P-CCNC: 31 U/L (ref 0–50)
ANION GAP SERPL CALCULATED.3IONS-SCNC: 1 MMOL/L (ref 3–14)
AST SERPL W P-5'-P-CCNC: 19 U/L (ref 0–45)
BASOPHILS # BLD AUTO: 0 10E9/L (ref 0–0.2)
BASOPHILS NFR BLD AUTO: 0 %
BILIRUB SERPL-MCNC: 0.2 MG/DL (ref 0.2–1.3)
BUN SERPL-MCNC: 12 MG/DL (ref 7–30)
CALCIUM SERPL-MCNC: 8.1 MG/DL (ref 8.5–10.1)
CHLORIDE SERPL-SCNC: 106 MMOL/L (ref 94–109)
CO2 SERPL-SCNC: 30 MMOL/L (ref 20–32)
CREAT SERPL-MCNC: 0.75 MG/DL (ref 0.52–1.04)
DIFFERENTIAL METHOD BLD: ABNORMAL
EOSINOPHIL # BLD AUTO: 0.2 10E9/L (ref 0–0.7)
EOSINOPHIL NFR BLD AUTO: 3 %
ERYTHROCYTE [DISTWIDTH] IN BLOOD BY AUTOMATED COUNT: 16.7 % (ref 10–15)
GFR SERPL CREATININE-BSD FRML MDRD: 81 ML/MIN/{1.73_M2}
GLUCOSE SERPL-MCNC: 95 MG/DL (ref 70–99)
HCT VFR BLD AUTO: 27.2 % (ref 35–47)
HGB BLD-MCNC: 8.9 G/DL (ref 11.7–15.7)
LYMPHOCYTES # BLD AUTO: 2.5 10E9/L (ref 0.8–5.3)
LYMPHOCYTES NFR BLD AUTO: 32 %
MCH RBC QN AUTO: 31.4 PG (ref 26.5–33)
MCHC RBC AUTO-ENTMCNC: 32.7 G/DL (ref 31.5–36.5)
MCV RBC AUTO: 96 FL (ref 78–100)
METAMYELOCYTES # BLD: 0.2 10E9/L
METAMYELOCYTES NFR BLD MANUAL: 3 %
MONOCYTES # BLD AUTO: 0.7 10E9/L (ref 0–1.3)
MONOCYTES NFR BLD AUTO: 9 %
NEUTROPHILS # BLD AUTO: 4.1 10E9/L (ref 1.6–8.3)
NEUTROPHILS NFR BLD AUTO: 53 %
PLATELET # BLD AUTO: 315 10E9/L (ref 150–450)
PLATELET # BLD EST: ABNORMAL 10*3/UL
POTASSIUM SERPL-SCNC: 3.8 MMOL/L (ref 3.4–5.3)
PROT SERPL-MCNC: 9.4 G/DL (ref 6.8–8.8)
RBC # BLD AUTO: 2.83 10E12/L (ref 3.8–5.2)
RBC MORPH BLD: NORMAL
SODIUM SERPL-SCNC: 137 MMOL/L (ref 133–144)
WBC # BLD AUTO: 7.7 10E9/L (ref 4–11)

## 2019-08-27 PROCEDURE — 85025 COMPLETE CBC W/AUTO DIFF WBC: CPT | Mod: ZL | Performed by: NURSE PRACTITIONER

## 2019-08-27 PROCEDURE — 25000128 H RX IP 250 OP 636: Mod: JW | Performed by: NURSE PRACTITIONER

## 2019-08-27 PROCEDURE — 36415 COLL VENOUS BLD VENIPUNCTURE: CPT | Mod: ZL | Performed by: NURSE PRACTITIONER

## 2019-08-27 PROCEDURE — G0463 HOSPITAL OUTPT CLINIC VISIT: HCPCS | Mod: 25

## 2019-08-27 PROCEDURE — G0463 HOSPITAL OUTPT CLINIC VISIT: HCPCS

## 2019-08-27 PROCEDURE — 99214 OFFICE O/P EST MOD 30 MIN: CPT | Performed by: NURSE PRACTITIONER

## 2019-08-27 PROCEDURE — 80053 COMPREHEN METABOLIC PANEL: CPT | Mod: ZL | Performed by: NURSE PRACTITIONER

## 2019-08-27 PROCEDURE — 96401 CHEMO ANTI-NEOPL SQ/IM: CPT

## 2019-08-27 RX ORDER — ALBUTEROL SULFATE 0.83 MG/ML
2.5 SOLUTION RESPIRATORY (INHALATION)
Status: CANCELLED | OUTPATIENT
Start: 2019-08-30

## 2019-08-27 RX ORDER — METHYLPREDNISOLONE SODIUM SUCCINATE 125 MG/2ML
125 INJECTION, POWDER, LYOPHILIZED, FOR SOLUTION INTRAMUSCULAR; INTRAVENOUS
Status: CANCELLED
Start: 2019-08-27

## 2019-08-27 RX ORDER — DIPHENHYDRAMINE HYDROCHLORIDE 50 MG/ML
50 INJECTION INTRAMUSCULAR; INTRAVENOUS
Status: CANCELLED
Start: 2019-09-03

## 2019-08-27 RX ORDER — EPINEPHRINE 0.3 MG/.3ML
0.3 INJECTION SUBCUTANEOUS EVERY 5 MIN PRN
Status: CANCELLED | OUTPATIENT
Start: 2019-08-27

## 2019-08-27 RX ORDER — ALBUTEROL SULFATE 90 UG/1
1-2 AEROSOL, METERED RESPIRATORY (INHALATION)
Status: CANCELLED
Start: 2019-09-06

## 2019-08-27 RX ORDER — NALOXONE HYDROCHLORIDE 0.4 MG/ML
.1-.4 INJECTION, SOLUTION INTRAMUSCULAR; INTRAVENOUS; SUBCUTANEOUS
Status: CANCELLED | OUTPATIENT
Start: 2019-08-30

## 2019-08-27 RX ORDER — SODIUM CHLORIDE 9 MG/ML
1000 INJECTION, SOLUTION INTRAVENOUS CONTINUOUS PRN
Status: CANCELLED
Start: 2019-08-27

## 2019-08-27 RX ORDER — EPINEPHRINE 1 MG/ML
0.3 INJECTION, SOLUTION, CONCENTRATE INTRAVENOUS EVERY 5 MIN PRN
Status: CANCELLED | OUTPATIENT
Start: 2019-09-06

## 2019-08-27 RX ORDER — LORAZEPAM 2 MG/ML
0.5 INJECTION INTRAMUSCULAR EVERY 4 HOURS PRN
Status: CANCELLED
Start: 2019-09-06

## 2019-08-27 RX ORDER — EPINEPHRINE 1 MG/ML
0.3 INJECTION, SOLUTION, CONCENTRATE INTRAVENOUS EVERY 5 MIN PRN
Status: CANCELLED | OUTPATIENT
Start: 2019-08-27

## 2019-08-27 RX ORDER — LORAZEPAM 2 MG/ML
0.5 INJECTION INTRAMUSCULAR EVERY 4 HOURS PRN
Status: CANCELLED
Start: 2019-08-30

## 2019-08-27 RX ORDER — ALBUTEROL SULFATE 90 UG/1
1-2 AEROSOL, METERED RESPIRATORY (INHALATION)
Status: CANCELLED
Start: 2019-08-30

## 2019-08-27 RX ORDER — LORAZEPAM 2 MG/ML
0.5 INJECTION INTRAMUSCULAR EVERY 4 HOURS PRN
Status: CANCELLED
Start: 2019-09-03

## 2019-08-27 RX ORDER — SODIUM CHLORIDE 9 MG/ML
1000 INJECTION, SOLUTION INTRAVENOUS CONTINUOUS PRN
Status: CANCELLED
Start: 2019-09-03

## 2019-08-27 RX ORDER — DIPHENHYDRAMINE HYDROCHLORIDE 50 MG/ML
50 INJECTION INTRAMUSCULAR; INTRAVENOUS
Status: CANCELLED
Start: 2019-08-27

## 2019-08-27 RX ORDER — METHYLPREDNISOLONE SODIUM SUCCINATE 125 MG/2ML
125 INJECTION, POWDER, LYOPHILIZED, FOR SOLUTION INTRAMUSCULAR; INTRAVENOUS
Status: CANCELLED
Start: 2019-09-06

## 2019-08-27 RX ORDER — ALBUTEROL SULFATE 90 UG/1
1-2 AEROSOL, METERED RESPIRATORY (INHALATION)
Status: CANCELLED
Start: 2019-08-27

## 2019-08-27 RX ORDER — SODIUM CHLORIDE 9 MG/ML
1000 INJECTION, SOLUTION INTRAVENOUS CONTINUOUS PRN
Status: CANCELLED
Start: 2019-09-06

## 2019-08-27 RX ORDER — MEPERIDINE HYDROCHLORIDE 25 MG/ML
25 INJECTION INTRAMUSCULAR; INTRAVENOUS; SUBCUTANEOUS EVERY 30 MIN PRN
Status: CANCELLED | OUTPATIENT
Start: 2019-08-27

## 2019-08-27 RX ORDER — ALBUTEROL SULFATE 0.83 MG/ML
2.5 SOLUTION RESPIRATORY (INHALATION)
Status: CANCELLED | OUTPATIENT
Start: 2019-08-27

## 2019-08-27 RX ORDER — ALBUTEROL SULFATE 90 UG/1
1-2 AEROSOL, METERED RESPIRATORY (INHALATION)
Status: CANCELLED
Start: 2019-09-03

## 2019-08-27 RX ORDER — LORAZEPAM 2 MG/ML
0.5 INJECTION INTRAMUSCULAR EVERY 4 HOURS PRN
Status: CANCELLED
Start: 2019-08-27

## 2019-08-27 RX ORDER — EPINEPHRINE 0.3 MG/.3ML
0.3 INJECTION SUBCUTANEOUS EVERY 5 MIN PRN
Status: CANCELLED | OUTPATIENT
Start: 2019-09-03

## 2019-08-27 RX ORDER — EPINEPHRINE 0.3 MG/.3ML
0.3 INJECTION SUBCUTANEOUS EVERY 5 MIN PRN
Status: CANCELLED | OUTPATIENT
Start: 2019-08-30

## 2019-08-27 RX ORDER — EPINEPHRINE 0.3 MG/.3ML
0.3 INJECTION SUBCUTANEOUS EVERY 5 MIN PRN
Status: CANCELLED | OUTPATIENT
Start: 2019-09-06

## 2019-08-27 RX ORDER — SODIUM CHLORIDE 9 MG/ML
1000 INJECTION, SOLUTION INTRAVENOUS CONTINUOUS PRN
Status: CANCELLED
Start: 2019-08-30

## 2019-08-27 RX ORDER — METHYLPREDNISOLONE SODIUM SUCCINATE 125 MG/2ML
125 INJECTION, POWDER, LYOPHILIZED, FOR SOLUTION INTRAMUSCULAR; INTRAVENOUS
Status: CANCELLED
Start: 2019-09-03

## 2019-08-27 RX ORDER — EPINEPHRINE 1 MG/ML
0.3 INJECTION, SOLUTION, CONCENTRATE INTRAVENOUS EVERY 5 MIN PRN
Status: CANCELLED | OUTPATIENT
Start: 2019-08-30

## 2019-08-27 RX ORDER — NALOXONE HYDROCHLORIDE 0.4 MG/ML
.1-.4 INJECTION, SOLUTION INTRAMUSCULAR; INTRAVENOUS; SUBCUTANEOUS
Status: CANCELLED | OUTPATIENT
Start: 2019-09-03

## 2019-08-27 RX ORDER — MEPERIDINE HYDROCHLORIDE 25 MG/ML
25 INJECTION INTRAMUSCULAR; INTRAVENOUS; SUBCUTANEOUS EVERY 30 MIN PRN
Status: CANCELLED | OUTPATIENT
Start: 2019-09-06

## 2019-08-27 RX ORDER — ALBUTEROL SULFATE 0.83 MG/ML
2.5 SOLUTION RESPIRATORY (INHALATION)
Status: CANCELLED | OUTPATIENT
Start: 2019-09-06

## 2019-08-27 RX ORDER — NALOXONE HYDROCHLORIDE 0.4 MG/ML
.1-.4 INJECTION, SOLUTION INTRAMUSCULAR; INTRAVENOUS; SUBCUTANEOUS
Status: CANCELLED | OUTPATIENT
Start: 2019-09-06

## 2019-08-27 RX ORDER — DIPHENHYDRAMINE HYDROCHLORIDE 50 MG/ML
50 INJECTION INTRAMUSCULAR; INTRAVENOUS
Status: CANCELLED
Start: 2019-09-06

## 2019-08-27 RX ORDER — EPINEPHRINE 1 MG/ML
0.3 INJECTION, SOLUTION, CONCENTRATE INTRAVENOUS EVERY 5 MIN PRN
Status: CANCELLED | OUTPATIENT
Start: 2019-09-03

## 2019-08-27 RX ORDER — MEPERIDINE HYDROCHLORIDE 25 MG/ML
25 INJECTION INTRAMUSCULAR; INTRAVENOUS; SUBCUTANEOUS EVERY 30 MIN PRN
Status: CANCELLED | OUTPATIENT
Start: 2019-09-03

## 2019-08-27 RX ORDER — NALOXONE HYDROCHLORIDE 0.4 MG/ML
.1-.4 INJECTION, SOLUTION INTRAMUSCULAR; INTRAVENOUS; SUBCUTANEOUS
Status: CANCELLED | OUTPATIENT
Start: 2019-08-27

## 2019-08-27 RX ORDER — ALBUTEROL SULFATE 0.83 MG/ML
2.5 SOLUTION RESPIRATORY (INHALATION)
Status: CANCELLED | OUTPATIENT
Start: 2019-09-03

## 2019-08-27 RX ORDER — METHYLPREDNISOLONE SODIUM SUCCINATE 125 MG/2ML
125 INJECTION, POWDER, LYOPHILIZED, FOR SOLUTION INTRAMUSCULAR; INTRAVENOUS
Status: CANCELLED
Start: 2019-08-30

## 2019-08-27 RX ORDER — DIPHENHYDRAMINE HYDROCHLORIDE 50 MG/ML
50 INJECTION INTRAMUSCULAR; INTRAVENOUS
Status: CANCELLED
Start: 2019-08-30

## 2019-08-27 RX ORDER — MEPERIDINE HYDROCHLORIDE 25 MG/ML
25 INJECTION INTRAMUSCULAR; INTRAVENOUS; SUBCUTANEOUS EVERY 30 MIN PRN
Status: CANCELLED | OUTPATIENT
Start: 2019-08-30

## 2019-08-27 RX ADMIN — BORTEZOMIB 2.3 MG: 3.5 INJECTION, POWDER, LYOPHILIZED, FOR SOLUTION INTRAVENOUS; SUBCUTANEOUS at 15:14

## 2019-08-27 ASSESSMENT — PAIN SCALES - GENERAL: PAINLEVEL: NO PAIN (0)

## 2019-08-27 ASSESSMENT — MIFFLIN-ST. JEOR
SCORE: 1184.82
SCORE: 1184.95

## 2019-08-27 ASSESSMENT — PATIENT HEALTH QUESTIONNAIRE - PHQ9: SUM OF ALL RESPONSES TO PHQ QUESTIONS 1-9: 3

## 2019-08-27 NOTE — NURSING NOTE
"Chief Complaint   Patient presents with     RECHECK     Multiple myeloma not having achieved remission        Initial /62   Pulse 89   Temp 98.7  F (37.1  C) (Tympanic)   Ht 1.6 m (5' 3\")   Wt 69.6 kg (153 lb 6.4 oz)   SpO2 94%   BMI 27.17 kg/m   Estimated body mass index is 27.17 kg/m  as calculated from the following:    Height as of this encounter: 1.6 m (5' 3\").    Weight as of this encounter: 69.6 kg (153 lb 6.4 oz).  Medication Reconciliation: complete   Immunizations reviewed; pain=0; phq=3; ahd on file.  Elena Holcomb LPN  "

## 2019-08-27 NOTE — PATIENT INSTRUCTIONS
We would like to see you back per your calender. You will need labwork 30 minutes prior to your next appointment.    When you are in need of a refill, please call your pharmacy and they will send us a request.     If you have any questions please call [phone]    Other instructions:      1.  Please start calcium 600mg daily with Vitamin D3 400units  2.  Add senna 8.6mg to your stool softner-may use Miralax as needed.

## 2019-08-27 NOTE — PROGRESS NOTES
Oncology Follow-up Visit:  August 27, 2019    Reason for Visit:  Patient presents with:  RECHECK: Multiple myeloma not having achieved remission      Nursing Note and documentation reviewed: yes    HPI:   This is a 68-year-old female patient who presents to the oncology clinic today for evaluation prior to receiving cycle 4 chemotherapy for Stage II-RISS IgG multiple myeloma diagnosed June 2019.  She is currently undergoing treatment with Velcade, dexamethasone.      She presents to the clinic today stating she is feeling well.  She admits her appetite is somewhat decreased during chemo but the week off she does well.  She keeps  up on her fluid intake.  She did have difficulty with some constipation after this last cycle stating she actually had a day where she became nauseated and vomited.  She is currently using Metamucil in the morning and a stool softener and every other day using a glycerin suppository in order to go.  This so far for the past week has been working well.  She is trying to keep her activity level up walking when she feels up to it.  She does have some mild to moderate fatigue.  She states her hands will get hot at times but she denies any numbness and tingling.  She is taking no extra calcium or vitamin D supplements and is due for her next Zometa infusion in October.    Oncologic History:     12/31/2018   patient presented to the emergency room with vertigo and fatigue.  CT scan of the head was negative and subsequent stress test was negative.  5/3/2019  She was seen by her PCP who ordered lab work and noted a total protein of 12.9.  SPEP at that time showed an M spike of 6.2 with a large monoclonal protein seen in the gamma fraction.  Urine immunofixation showed a possible small protein band in the gamma fraction  5/31/2019  she was evaluated by Dr. Walker with Medical Oncology with plan to rule out myeloma and obtain bone marrow aspiration biopsy as well as a metastatic bone survey along  with additional labwork  6/18/2019 she underwent bone marrow aspiration and biopsy  6/24/2019  She was seen again by Dr. Walker and CBC showed a hemoglobin of 9.3, M spike 7.3 with monoclonal IgG immunoglobulin of kappa light chain type; serum viscosity was 2.9; quantitative immunoglobulins showed an IgG of 8160, beta-2 microglobulin was 5.8, BUN was 21 with creatinine is 0.8 and total protein was 13.7.  Quantitative kappa/lambda free light chains showed an elevated ratio of 17.0 bone marrow aspiration biopsy showed plasma cell myeloma with approximately 80% plasma cells.  Immunofixation showed IgG kappa and flow cytometry revealed kappa monotypic plasma cells consistent with clonal plasma cell neoplasm and FISH panel was pending at that time.  It was felt she had at least stage II disease based on her beta-2 microglobulin and anemia.  Plan was to treat with Velcade 1.3 mg per metered squared days 1, 4, 8 and 11/Decadron 40 mg on days 1, 8 and 15 initiation of Revlimid with the second cycle at 25 mg daily days 1 through 14.  Plan was to also obtain an MRI of the lumbar spine to rule out lytic lesion at L3.  She was initiated on Zovirax 400 mg p.o. twice daily.  6/25/2019  1st cycle of chemotherapy initiated  7/1/2019 note in chart regarding patient's large co-pay for the Revlimid and no plan at this point to initiate Revlimid and treat only with Velcade and Decadron per Dr. Walker  7/11/2019  MRI lumbar spine shows a pathologic superior endplate compression fracture at L3 without evidence of retropulsed fragment and innumerable enhancing lesions throughout the lumbar spine consistent with history of multiple myeloma.     Current Chemo Regime/TX: Velcade 1.3 mg per metered squared on days 1, 4, 8 and 11 with Decadron 40 mg on days 1, 8 and 15     **Zometa 4mg every 3 months  Current Cycle: 4  # of completed cycles:  3     Previous treatment:  n/a    Past Medical History:   Diagnosis Date     Arthritis       "Depressive disorder      Essential hypertension 10/1/2015     Major depressive disorder, recurrent episode, mild (H) 10/1/2015     Mixed hyperlipidemia 10/1/2015     Multiple myeloma not having achieved remission (H) 6/24/2019     Other specified disorders of bladder 07/09/2012    Irritable Bladder     Seasonal allergies 10/1/2015     Unspecified essential hypertension 03/19/2007     Unspecified sinusitis (chronic) 09/05/2007       Past Surgical History:   Procedure Laterality Date     APPENDECTOMY       BONE MARROW BIOPSY, BONE SPECIMEN, NEEDLE/TROCAR N/A 6/18/2019    Procedure: BONE MARROW BIOPSY;  Surgeon: Maciej Sanz MD;  Location: [facility] OR     CHOLECYSTECTOMY       COLONOSCOPY  07-    repeat 10 years     COLONOSCOPY N/A 12/30/2016    Procedure: COLONOSCOPY;  Surgeon: Bhaskar Franklin DO;  Location: [facility] OR     SINUS SURGERY       TUBAL STERILIZATION         Family History   Problem Relation Age of Onset     Breast Cancer Mother 66        Cause of Death     Parkinsonism Father         \"Possible\"     Coronary Artery Disease Father      Pacemaker Father      Thyroid Disease Daughter      Diabetes No family hx of      Hypertension No family hx of      Hyperlipidemia No family hx of      Cerebrovascular Disease No family hx of      Colon Cancer No family hx of      Prostate Cancer No family hx of      Genetic Disorder No family hx of      Asthma No family hx of      Anesthesia Reaction No family hx of        Social History     Socioeconomic History     Marital status:      Spouse name: Not on file     Number of children: Not on file     Years of education: Not on file     Highest education level: Not on file   Occupational History     Occupation: Financial     Comment:  - ([employer])   Social Needs     Financial resource strain: Not on file     Food insecurity:     Worry: Not on file     Inability: Not on file     Transportation needs:     Medical: Not on file     Non-medical: Not on file "   Tobacco Use     Smoking status: Never Smoker     Smokeless tobacco: Never Used     Tobacco comment: Tried to Quit (YES); QUIT in 1971; Passive Exposure (NO)   Substance and Sexual Activity     Alcohol use: Yes     Comment: RARELY     Drug use: No     Sexual activity: Yes     Partners: Male     Birth control/protection: None   Lifestyle     Physical activity:     Days per week: Not on file     Minutes per session: Not on file     Stress: Not on file   Relationships     Social connections:     Talks on phone: Not on file     Gets together: Not on file     Attends [employer]ism service: Not on file     Active member of club or organization: Not on file     Attends meetings of clubs or organizations: Not on file     Relationship status: Not on file     Intimate partner violence:     Fear of current or ex partner: Not on file     Emotionally abused: Not on file     Physically abused: Not on file     Forced sexual activity: Not on file   Other Topics Concern      Service Not Asked     Blood Transfusions Not Asked     Caffeine Concern Yes     Comment: Coffee >6 cups daily     Occupational Exposure Not Asked     Hobby Hazards Not Asked     Sleep Concern Not Asked     Stress Concern Not Asked     Weight Concern Not Asked     Special Diet Not Asked     Back Care Not Asked     Exercise Not Asked     Bike Helmet Not Asked     Seat Belt Not Asked     Self-Exams Not Asked     Parent/sibling w/ CABG, MI or angioplasty before 65F 55M? No   Social History Narrative     Not on file       Current Outpatient Medications   Medication     acyclovir (ZOVIRAX) 400 MG tablet     atorvastatin (LIPITOR) 10 MG tablet     citalopram (CELEXA) 20 MG tablet     dexamethasone (DECADRON) 4 MG tablet     EPINEPHrine (EPIPEN) 0.3 MG/0.3ML injection     LORazepam (ATIVAN) 0.5 MG tablet     losartan (COZAAR) 50 MG tablet     prochlorperazine (COMPAZINE) 10 MG tablet     fluticasone (FLONASE) 50 MCG/ACT nasal spray     No current  "facility-administered medications for this visit.         Allergies   Allergen Reactions     Lisinopril Cough     Phenylephrine Hcl Other (See Comments)     **Entex  HEADACHE (SEVERE)     Phenylpropanolamine Other (See Comments)     **Entex  HEADACHE (SEVERE)     Pseudoephedrine Tannate Other (See Comments)     **Entex  HEADACHE (SEVERE)     Levofloxacin Rash     **Levaquin     Moxifloxacin Hcl [Avelox] Rash       Review Of Systems:  Constitutional:    denies fever, weight changes, chills, and night sweats.  Eyes:    denies blurred or double vision  Ears/Nose/Throat:   denies ear pain, nose problems, difficulty swallowing  Respiratory:   denies shortness of breath, cough   Skin:   denies rash, lesions  Cardiovascular:   denies chest pain, palpitations, edema  Gastrointestinal:  See hPI  Genitourinary:   denies difficulty with urination, blood in urine  Musculoskeletal:    denies new muscle pain, bone pain  Neurologic:   denies lightheadedness, headaches, numbness or tingling  Psychiatric:   denies anxiety, depression  Hematologic/Lymphatic/Immunologic:   denies easy bruising, easy bleeding, lumps or bumps noted  Endocrine:   Denies increased thirst; has dry mouth at times      ECOG Performance Status: 1    Physical Exam:  /62   Pulse 89   Temp 98.7  F (37.1  C) (Tympanic)   Ht 1.6 m (5' 3\")   Wt 69.6 kg (153 lb 6.4 oz)   SpO2 94%   BMI 27.17 kg/m      GENERAL APPEARANCE: Healthy, alert and in no acute distress.  HEENT: Normocephalic, Sclerae anicteric. Oropharynx without ulcers, lesions, or thrush.  NECK:   No asymmetry or masses, no thyromegaly.  LYMPHATICS: No palpable cervical, supraclavicular, axillary, or inguinal nodes   RESP: Lungs clear to auscultation bilaterally, respirations regular and easy  CARDIOVASCULAR: Regular rate and rhythm. Normal S1, S2  ABDOMEN: Soft, nontender. Bowel sounds auscultated all 4 quadrants. No palpable organomegaly or masses.  MUSCULOSKELETAL: Extremities without gross " deformities noted. No edema of bilateral lower extremities.  NEURO: Alert and oriented x 3.  Gait steady.  PSYCHIATRIC: Mentation and affect appear normal.  Mood appropriate.    Laboratory:    Component Value Flag Ref Range Units Status Collected Lab   WBC 7.7   4.0 - 11.0 10e9/L Final 08/27/2019  1:58 PM [facility]   RBC Count 2.83  Low   3.8 - 5.2 10e12/L Final 08/27/2019  1:58 PM [facility]   Hemoglobin 8.9  Low   11.7 - 15.7 g/dL Final 08/27/2019  1:58 PM [facility]   Hematocrit 27.2  Low   35.0 - 47.0 % Final 08/27/2019  1:58 PM [facility]   MCV 96   78 - 100 fl Final 08/27/2019  1:58 PM [facility]   MCH 31.4   26.5 - 33.0 pg Final 08/27/2019  1:58 PM [facility]   MCHC 32.7   31.5 - 36.5 g/dL Final 08/27/2019  1:58 PM [facility]   RDW 16.7  High   10.0 - 15.0 % Final 08/27/2019  1:58 PM [facility]   Platelet Count 315   150 - 450 10e9/L Final 08/27/2019  1:58 PM [facility]   Diff Method     Final 08/27/2019  1:58 PM [facility]   Manual Differential    % Neutrophils 53.0    % Final 08/27/2019  1:58 PM [facility]   % Lymphocytes 32.0    % Final 08/27/2019  1:58 PM [facility]   % Monocytes 9.0    % Final 08/27/2019  1:58 PM [facility]   % Eosinophils 3.0    % Final 08/27/2019  1:58 PM [facility]   % Basophils 0.0    % Final 08/27/2019  1:58 PM [facility]   % Metamyelocytes 3.0    % Final 08/27/2019  1:58 PM [facility]   Absolute Neutrophil 4.1   1.6 - 8.3 10e9/L Final 08/27/2019  1:58 PM [facility]   Absolute Lymphocytes 2.5   0.8 - 5.3 10e9/L Final 08/27/2019  1:58 PM [facility]   Absolute Monocytes 0.7   0.0 - 1.3 10e9/L Final 08/27/2019  1:58 PM [facility]   Absolute Eosinophils 0.2   0.0 - 0.7 10e9/L Final 08/27/2019  1:58 PM [facility]   Absolute Basophils 0.0   0.0 - 0.2 10e9/L Final 08/27/2019  1:58 PM [facility]   Absolute Metamyelocytes 0.2  High   0 10e9/L Final 08/27/2019  1:58 PM [facility]   RBC Morphology Normal     Final 08/27/2019  1:58 PM [facility]   Platelet Estimate     Final 08/27/2019  1:58 PM [facility]   Automated count confirmed.  Platelet morphology is normal.      Component Value Flag Ref Range Units Status Collected Lab   Sodium 137   133 - 144 mmol/L Final 08/27/2019  1:58  PM [facility]   Potassium 3.8   3.4 - 5.3 mmol/L Final 08/27/2019  1:58 PM [facility]   Chloride 106   94 - 109 mmol/L Final 08/27/2019  1:58 PM [facility]   Carbon Dioxide 30   20 - 32 mmol/L Final 08/27/2019  1:58 PM [facility]   Anion Gap 1  Low   3 - 14 mmol/L Final 08/27/2019  1:58 PM [facility]   Glucose 95   70 - 99 mg/dL Final 08/27/2019  1:58 PM [facility]   Urea Nitrogen 12   7 - 30 mg/dL Final 08/27/2019  1:58 PM [facility]   Creatinine 0.75   0.52 - 1.04 mg/dL Final 08/27/2019  1:58 PM [facility]   GFR Estimate 81   >60 mL/min/ Final 08/27/2019  1:58 PM [facility]   Comment:   Non  GFR Calc   Starting 12/18/2018, serum creatinine based estimated GFR (eGFR) will be   calculated using the Chronic Kidney Disease Epidemiology Collaboration   (CKD-EPI) equation.    GFR Estimate If Black >90   >60 mL/min/ Final 08/27/2019  1:58 PM [facility]   Comment:    GFR Calc   Starting 12/18/2018, serum creatinine based estimated GFR (eGFR) will be   calculated using the Chronic Kidney Disease Epidemiology Collaboration   (CKD-EPI) equation.    Calcium 8.1  Low   8.5 - 10.1 mg/dL Final 08/27/2019  1:58 PM [facility]   Bilirubin Total 0.2   0.2 - 1.3 mg/dL Final 08/27/2019  1:58 PM [facility]   Albumin 2.9  Low   3.4 - 5.0 g/dL Final 08/27/2019  1:58 PM [facility]   Protein Total 9.4  High   6.8 - 8.8 g/dL Final 08/27/2019  1:58 PM [facility]   Alkaline Phosphatase 86   40 - 150 U/L Final 08/27/2019  1:58 PM [facility]   ALT 31   0 - 50 U/L Final 08/27/2019  1:58 PM [facility]   AST 19   0 - 45 U/L Final 08/27/2019  1:58 PM [facility]       Results for orders placed or performed in visit on 08/20/19   Macroglobulins   Result Value Ref Range    Viscosity Index 2.0 (H) 1.4 - 1.8   Immunoglobulins A G and M   Result Value Ref Range    IGG 4,820 (H) 695 - 1,620 mg/dL    IGA <7 (L) 70 - 380 mg/dL    IGM 8 (L) 60 - 265 mg/dL   ELP reflex to IELP   Result Value Ref Range    Albumin Fraction 3.8 3.7 - 5.1 g/dL    Alpha 1 Fraction 0.4 0.2 - 0.4 g/dL    Alpha 2 Fraction 0.7 0.5 - 0.9 g/dL    Beta Fraction 0.6 0.6 - 1.0 g/dL    Gamma  Fraction 4.1 (H) 0.7 - 1.6 g/dL    Monoclonal Peak 3.9 (H) 0.0 g/dL    ELP Interpretation:       Large monoclonal protein (3.9 g/dL) seen in the gamma fraction. See immunofixation report   on same specimen. Pathologic significance requires clinical correlation. MADY Mcdowell M.D.,   Pathologist.      Beta 2 microglobulin   Result Value Ref Range    Beta-2-Microglobulin 5.2 (H) <2.3 mg/L   Immunofixation ELP   Result Value Ref Range    Immunofixation ELP (Note)        Imaging Studies:  None completed for today's visit      ASSESSMENT/PLAN:    #1 Multiple myeloma:   IgG kappa multiple myeloma with 80% involvement of the bone marrow diagnosed June 2019 and currently undergoing therapy with Velcade and Decadron.  The patient also had evidence of end-organ damage with anemia, elevated serum viscosity, lytic lesion at L3; elevated beta-2 microglobulin, elevated kappa lambda ratio.  M spike has decreased along with IgG level.  She will initiate cycle 4 today and follow-up prior to cycle 5 with myeloma labs.       #2 constipation: I recommended initiating senna 8.6 mg with her docusate sodium on a daily basis and increasing possibly up to 3 twice a day if needed.  I also recommended using MiraLAX as needed.    #3 myeloma lytic lesion: Corrected calcium is actually 8.9 and I did recommend instituting calcium 600 mg with vitamin D3 400 units on a daily basis.  Will be due for Zometa again in October.    I encouraged patient to call with any questions or concerns.     Barbie Quintana, NP  APRN, FNP-BC, AOCNP

## 2019-08-27 NOTE — PROGRESS NOTES
Patient is a 68 year old female here today for injection of Velcade per order of . Patient meets parameters for today's infusion. Patient identified with two identifiers, order verified, and verbal consent for today's infusion obtained from patient. Written consent for treatment is on file and valid.    Recent lab values: WBC: 7.7, HGB: 8.9, PLT: 315  ANC: 4.1. Patient meets order parameters for today's treatment.    Patient denies questions or concerns regarding injection and/or medication(s) being administered.  Velcade injected SQ into right upper outer SQ  per protocol rotating sites.     Injection administered per protocol. Patient tolerated infusion without incident, no signs or symptoms of adverse reaction noted. Patient denies pain or discomfort.     Covered with a sterile bandage. Pt instructed to leave bandage intact for a minimum of one hour, and to call with questions or concerns. Copy of appointments, discharge instructions, and after visit summary (AVS) provided to patient. Patient states understanding, discharged ambulatory.

## 2019-08-30 ENCOUNTER — INFUSION THERAPY VISIT (OUTPATIENT)
Dept: INFUSION THERAPY | Facility: OTHER | Age: 69
End: 2019-08-30
Attending: INTERNAL MEDICINE
Payer: MEDICARE

## 2019-08-30 VITALS
WEIGHT: 152 LBS | DIASTOLIC BLOOD PRESSURE: 65 MMHG | HEART RATE: 80 BPM | OXYGEN SATURATION: 98 % | TEMPERATURE: 97.7 F | SYSTOLIC BLOOD PRESSURE: 125 MMHG | RESPIRATION RATE: 16 BRPM | HEIGHT: 63 IN | BODY MASS INDEX: 26.93 KG/M2

## 2019-08-30 DIAGNOSIS — C90.00 MULTIPLE MYELOMA NOT HAVING ACHIEVED REMISSION (H): Primary | ICD-10-CM

## 2019-08-30 PROCEDURE — 25000128 H RX IP 250 OP 636: Performed by: NURSE PRACTITIONER

## 2019-08-30 PROCEDURE — 96401 CHEMO ANTI-NEOPL SQ/IM: CPT

## 2019-08-30 RX ADMIN — BORTEZOMIB 2.3 MG: 3.5 INJECTION, POWDER, LYOPHILIZED, FOR SOLUTION INTRAVENOUS; SUBCUTANEOUS at 14:02

## 2019-08-30 ASSESSMENT — MIFFLIN-ST. JEOR: SCORE: 1178.44

## 2019-08-30 ASSESSMENT — PAIN SCALES - GENERAL: PAINLEVEL: NO PAIN (1)

## 2019-08-30 NOTE — PROGRESS NOTES
Patient is a 68 year old female here today for injection of Velcade per order of . Patient meets parameters for today's infusion. Patient identified with two identifiers, order verified, and verbal consent for today's infusion obtained from patient. Written consent for treatment is on file and valid.    Patient meets order parameters for today's treatment.  Patient denies questions or concerns regarding injection and/or medication(s) being administered.  Velcade injected SQ into left upper outer arm  per protocol rotating sites.     Injection administered per protocol. Patient tolerated infusion without incident, no signs or symptoms of adverse reaction noted. Patient denies pain or discomfort.     Covered with a sterile bandage. Pt instructed to leave bandage intact for a minimum of one hour, and to call with questions or concerns. Copy of appointments, discharge instructions, and after visit summary (AVS) provided to patient. Patient states understanding, discharged ambulatory.

## 2019-09-03 ENCOUNTER — INFUSION THERAPY VISIT (OUTPATIENT)
Dept: INFUSION THERAPY | Facility: OTHER | Age: 69
End: 2019-09-03
Attending: INTERNAL MEDICINE
Payer: MEDICARE

## 2019-09-03 ENCOUNTER — APPOINTMENT (OUTPATIENT)
Dept: LAB | Facility: OTHER | Age: 69
End: 2019-09-03
Attending: INTERNAL MEDICINE
Payer: MEDICARE

## 2019-09-03 VITALS
BODY MASS INDEX: 26.9 KG/M2 | HEART RATE: 90 BPM | DIASTOLIC BLOOD PRESSURE: 67 MMHG | SYSTOLIC BLOOD PRESSURE: 111 MMHG | TEMPERATURE: 98.8 F | HEIGHT: 63 IN | RESPIRATION RATE: 16 BRPM | OXYGEN SATURATION: 97 % | WEIGHT: 151.8 LBS

## 2019-09-03 DIAGNOSIS — C90.00 MULTIPLE MYELOMA NOT HAVING ACHIEVED REMISSION (H): Primary | ICD-10-CM

## 2019-09-03 LAB
BASOPHILS # BLD AUTO: 0 10E9/L (ref 0–0.2)
BASOPHILS NFR BLD AUTO: 0.2 %
DIFFERENTIAL METHOD BLD: ABNORMAL
EOSINOPHIL # BLD AUTO: 0.1 10E9/L (ref 0–0.7)
EOSINOPHIL NFR BLD AUTO: 1 %
ERYTHROCYTE [DISTWIDTH] IN BLOOD BY AUTOMATED COUNT: 16.1 % (ref 10–15)
HCT VFR BLD AUTO: 27.3 % (ref 35–47)
HGB BLD-MCNC: 9.1 G/DL (ref 11.7–15.7)
IMM GRANULOCYTES # BLD: 0.1 10E9/L (ref 0–0.4)
IMM GRANULOCYTES NFR BLD: 1.9 %
LYMPHOCYTES # BLD AUTO: 2 10E9/L (ref 0.8–5.3)
LYMPHOCYTES NFR BLD AUTO: 33.4 %
MCH RBC QN AUTO: 31.6 PG (ref 26.5–33)
MCHC RBC AUTO-ENTMCNC: 33.3 G/DL (ref 31.5–36.5)
MCV RBC AUTO: 95 FL (ref 78–100)
MONOCYTES # BLD AUTO: 1 10E9/L (ref 0–1.3)
MONOCYTES NFR BLD AUTO: 16.4 %
NEUTROPHILS # BLD AUTO: 2.8 10E9/L (ref 1.6–8.3)
NEUTROPHILS NFR BLD AUTO: 47.1 %
NRBC # BLD AUTO: 0 10*3/UL
NRBC BLD AUTO-RTO: 0 /100
PLATELET # BLD AUTO: 243 10E9/L (ref 150–450)
RBC # BLD AUTO: 2.88 10E12/L (ref 3.8–5.2)
WBC # BLD AUTO: 5.9 10E9/L (ref 4–11)

## 2019-09-03 PROCEDURE — 96401 CHEMO ANTI-NEOPL SQ/IM: CPT

## 2019-09-03 PROCEDURE — 25000128 H RX IP 250 OP 636: Performed by: NURSE PRACTITIONER

## 2019-09-03 PROCEDURE — 36415 COLL VENOUS BLD VENIPUNCTURE: CPT | Mod: ZL | Performed by: NURSE PRACTITIONER

## 2019-09-03 PROCEDURE — 85025 COMPLETE CBC W/AUTO DIFF WBC: CPT | Mod: ZL | Performed by: NURSE PRACTITIONER

## 2019-09-03 RX ADMIN — BORTEZOMIB 2.3 MG: 3.5 INJECTION, POWDER, LYOPHILIZED, FOR SOLUTION INTRAVENOUS; SUBCUTANEOUS at 14:54

## 2019-09-03 ASSESSMENT — MIFFLIN-ST. JEOR: SCORE: 1177.56

## 2019-09-03 NOTE — PROGRESS NOTES
Patient is a 68 year old female here today for injection of Velcade per order of Dr Walker. Patient meets  parameters to administer injection; today's lab values are WBC: 5.9,  HB.1,  ANC: 2.8,  PLT: 243.  Independent dose check completed with Christine Otoole RN. Patient denies questions nor concerns regarding medication, administration site, side effects, nor aftercare.  Patient identified with two identifiers, order verified, and verbal consent for today's injection obtained from patient.     Patient education provided on s/s of injection site infection, and/or medication specific side effects, and when to call a provider.  Patient instructed to report any adverse effects.   1454 Velcade 2.3mg  administered subcutaneous per protocol in right upper outer arm at 45 degree angle.  Site covered with sterile bandage.  Patient tolerated injection well, no verbal nor non-verbal signs of discomfort noted.  No adverse effects noted at this time.   Patient instructed to call with further questions or concerns.  Patient states understanding and is in agreement with this plan.  Patient declines copy of appointments, and after visit summary (AVS). Patient discharged ambulatory.  Irene Booth RN

## 2019-09-03 NOTE — PATIENT INSTRUCTIONS
Patient Education     Velcade  Generic Name: Bortezomib  Drug type  Velcade stops the growth of certain cancer cells.  What this drug is used for   Multiple myeloma, mantle cell lymphoma   or _______________________________________.   How this drug is given  This drug is usually given by IV or as a shot under the skin (thigh or stomach). If you receive the medicine by IV, it may leak out of the vein. Your nurses are trained to catch this, but please let them know right away if you think the medicine is leaking. You would feel pain, discomfort, and heat. You might see redness on the skin around the IV.  The amount of medicine that you receive depends on many things. Your doctor will decide on the best dose for you.  Make sure your health care team knows about all the medicines and supplements you take. This will help prevent drug interactions.   Side effects  Most people do not have all the side effects listed. Side effects will usually go away after treatment is complete. Rare side effects are not listed here, so tell your doctor if you have any unusual symptoms.  Common side effects (occur in more than 3 out of 10 people):    Feeling tired and weak    Numbness and tingling in the hands and feet    Loose, watery stools (diarrhea) or hard, dry stools (constipation)    Feeling sick to your stomach (nausea)    Throwing up (vomiting)    Do not feel hungry    Fever    Low platelets (risk of bleeding)    Low red blood cells (anemia)  Less common side effects (occur in less than 3 out of 10 people):    Headache    Trouble sleeping    Joint or bone pain    Muscle cramps    Swelling in the body    Feel dizzy    Rash, Itching    Blurry vision    Cough    Heartburn    Low blood pressure  Call your doctor right away if you have:    Fever of 100.4 F (38 C) or higher and/or chills    Signs of severe allergy: trouble breathing, your throat closes up, swelling of the mouth, face, lips or tongue, or hives (red, itchy spots on the  skin).  Call your doctor within 24 hours if you have:    Nausea so bad that you can't eat or drink and medicine doesn't help    Loose, watery stools 4 to 6 times in 24 hours    Blisters on the skin where IV was inserted    You feel so tired that you can't care for yourself    Vomiting more than 4 to 5 times in 24 hours    Black or tarry stools or blood in your stools    Blood in urine (pee)    Unusual bruising or bleeding    Shortness of breath    Mouth pain    Swelling or redness in just one arm or leg    Swelling of the feet or ankles or sudden weight gain.    Numbness or tingling in the hands or feet  Other information:________________________  _____________________________________  _____________________________________  If you have any side effects, call us. We can help you cope with them.   For more information, see:  www.chemocare.com   www.nlm.nih.gov/medlineplus/druginformation.htm  www.cancer.gov/cancertopics/coping/chemotherapy-and-you  For informational purposes only. Not to replace the advice of your health care provider.   Developed in collaboration with [facility].  Copyright   2014 [employer]. All rights reserved. [employer] 736591 - REV 05/16.  For informational purposes only. Not to replace the advice of your health care provider.  Copyright   2018 [employer]. All rights reserved.

## 2019-09-06 ENCOUNTER — INFUSION THERAPY VISIT (OUTPATIENT)
Dept: INFUSION THERAPY | Facility: OTHER | Age: 69
End: 2019-09-06
Attending: INTERNAL MEDICINE
Payer: MEDICARE

## 2019-09-06 VITALS
HEIGHT: 63 IN | BODY MASS INDEX: 26.59 KG/M2 | SYSTOLIC BLOOD PRESSURE: 98 MMHG | DIASTOLIC BLOOD PRESSURE: 58 MMHG | OXYGEN SATURATION: 95 % | RESPIRATION RATE: 16 BRPM | HEART RATE: 53 BPM | TEMPERATURE: 98.1 F | WEIGHT: 150.1 LBS

## 2019-09-06 DIAGNOSIS — C90.00 MULTIPLE MYELOMA NOT HAVING ACHIEVED REMISSION (H): Primary | ICD-10-CM

## 2019-09-06 PROCEDURE — 96401 CHEMO ANTI-NEOPL SQ/IM: CPT

## 2019-09-06 PROCEDURE — 25000128 H RX IP 250 OP 636: Performed by: NURSE PRACTITIONER

## 2019-09-06 RX ADMIN — BORTEZOMIB 2.3 MG: 3.5 INJECTION, POWDER, LYOPHILIZED, FOR SOLUTION INTRAVENOUS; SUBCUTANEOUS at 12:58

## 2019-09-06 ASSESSMENT — MIFFLIN-ST. JEOR: SCORE: 1169.85

## 2019-09-06 NOTE — PROGRESS NOTES
Patient is a 68 year old female her today for injection of Velcade per order of . Patient meets parameters for today's infusion. Patient identified with two identifiers, order verified, and verbal consent for today's infusion obtained from patient. Written consent for treatment is on file and valid.    Independent dose check with Yancy Quintanilla RN prior to release of drug.    Patient denies questions or concerns regarding injection and/or medication(s) being administered.  1258 Velcade 2.3mg injected SQ into left upper outer arm at 45 degree angle  per protocol rotating sites.     Injection administered per protocol. Patient tolerated infusion without incident, no signs or symptoms of adverse reaction noted. Patient denies pain or discomfort.     Covered with a sterile bandage. Pt instructed to leave bandage intact for a minimum of one hour, and to call with questions or concerns. Copy of appointments, discharge instructions, and after visit summary (AVS) provided to patient. Patient states understanding, discharged ambulatory. Irene Booth RN

## 2019-09-06 NOTE — PATIENT INSTRUCTIONS
Patient Education     Bortezomib Solution for injection  What is this medicine?  BORTEZOMIB (bor MARYJO oh mib) is a chemotherapy drug. It slows the growth of cancer cells. This medicine is used to treat multiple myeloma, and certain lymphomas, such as mantle-cell lymphoma.  This medicine may be used for other purposes; ask your health care provider or pharmacist if you have questions.  What should I tell my health care provider before I take this medicine?  They need to know if you have any of these conditions:    diabetes    heart disease    irregular heartbeat    liver disease    on hemodialysis    low blood counts, like low white blood cells, platelets, or hemoglobin    peripheral neuropathy    taking medicine for blood pressure    an unusual or allergic reaction to bortezomib, mannitol, boron, other medicines, foods, dyes, or preservatives    pregnant or trying to get pregnant    breast-feeding  How should I use this medicine?  This medicine is for injection into a vein or for injection under the skin. It is given by a health care professional in a hospital or clinic setting.  Talk to your pediatrician regarding the use of this medicine in children. Special care may be needed.  Overdosage: If you think you have taken too much of this medicine contact a poison control center or emergency room at once.  NOTE: This medicine is only for you. Do not share this medicine with others.  What if I miss a dose?  It is important not to miss your dose. Call your doctor or health care professional if you are unable to keep an appointment.  What may interact with this medicine?  This medicine may interact with the following medications:    ketoconazole    rifampin    ritonavir    Hume s Wort  This list may not describe all possible interactions. Give your health care provider a list of all the medicines, herbs, non-prescription drugs, or dietary supplements you use. Also tell them if you smoke, drink alcohol, or use illegal  drugs. Some items may interact with your medicine.  What should I watch for while using this medicine?  Visit your doctor for checks on your progress. This drug may make you feel generally unwell. This is not uncommon, as chemotherapy can affect healthy cells as well as cancer cells. Report any side effects. Continue your course of treatment even though you feel ill unless your doctor tells you to stop.  You may get drowsy or dizzy. Do not drive, use machinery, or do anything that needs mental alertness until you know how this medicine affects you. Do not stand or sit up quickly, especially if you are an older patient. This reduces the risk of dizzy or fainting spells.  In some cases, you may be given additional medicines to help with side effects. Follow all directions for their use.  Call your doctor or health care professional for advice if you get a fever, chills or sore throat, or other symptoms of a cold or flu. Do not treat yourself. This drug decreases your body's ability to fight infections. Try to avoid being around people who are sick.  This medicine may increase your risk to bruise or bleed. Call your doctor or health care professional if you notice any unusual bleeding.  You may need blood work done while you are taking this medicine.  In some patients, this medicine may cause a serious brain infection that may cause death. If you have any problems seeing, thinking, speaking, walking, or standing, tell your doctor right away. If you cannot reach your doctor, urgently seek other source of medical care.  Do not become pregnant while taking this medicine. Women should inform their doctor if they wish to become pregnant or think they might be pregnant. There is a potential for serious side effects to an unborn child. Talk to your health care professional or pharmacist for more information. Do not breast-feed an infant while taking this medicine.  Check with your doctor or health care professional if you get  an attack of severe diarrhea, nausea and vomiting, or if you sweat a lot. The loss of too much body fluid can make it dangerous for you to take this medicine.  What side effects may I notice from receiving this medicine?  Side effects that you should report to your doctor or health care professional as soon as possible:    allergic reactions like skin rash, itching or hives, swelling of the face, lips, or tongue    breathing problems    changes in hearing    changes in vision    fast, irregular heartbeat    feeling faint or lightheaded, falls    pain, tingling, numbness in the hands or feet    right upper belly pain    seizures    swelling of the ankles, feet, hands    unusual bleeding or bruising    unusually weak or tired    vomiting    yellowing of the eyes or skin  Side effects that usually do not require medical attention (report to your doctor or health care professional if they continue or are bothersome):    changes in emotions or moods    constipation    diarrhea    loss of appetite    headache    irritation at site where injected    nausea  This list may not describe all possible side effects. Call your doctor for medical advice about side effects. You may report side effects to FDA at 2-718-FDA-3006.  Where should I keep my medicine?  This drug is given in a hospital or clinic and will not be stored at home.  NOTE:This sheet is a summary. It may not cover all possible information. If you have questions about this medicine, talk to your doctor, pharmacist, or health care provider. Copyright  2016 [employer]

## 2019-09-10 DIAGNOSIS — C90.00 MULTIPLE MYELOMA NOT HAVING ACHIEVED REMISSION (H): ICD-10-CM

## 2019-09-10 LAB — LDH SERPL L TO P-CCNC: 156 U/L (ref 81–234)

## 2019-09-10 PROCEDURE — 85810 BLOOD VISCOSITY EXAMINATION: CPT | Mod: ZL | Performed by: NURSE PRACTITIONER

## 2019-09-10 PROCEDURE — 00000402 ZZHCL STATISTIC TOTAL PROTEIN: Mod: ZL | Performed by: NURSE PRACTITIONER

## 2019-09-10 PROCEDURE — 83615 LACTATE (LD) (LDH) ENZYME: CPT | Mod: ZL | Performed by: NURSE PRACTITIONER

## 2019-09-10 PROCEDURE — 86334 IMMUNOFIX E-PHORESIS SERUM: CPT | Mod: ZL | Performed by: NURSE PRACTITIONER

## 2019-09-10 PROCEDURE — 82784 ASSAY IGA/IGD/IGG/IGM EACH: CPT | Mod: ZL | Performed by: NURSE PRACTITIONER

## 2019-09-10 PROCEDURE — 83883 ASSAY NEPHELOMETRY NOT SPEC: CPT | Mod: ZL | Performed by: NURSE PRACTITIONER

## 2019-09-10 PROCEDURE — 36415 COLL VENOUS BLD VENIPUNCTURE: CPT | Mod: ZL | Performed by: NURSE PRACTITIONER

## 2019-09-10 PROCEDURE — 82232 ASSAY OF BETA-2 PROTEIN: CPT | Mod: ZL | Performed by: NURSE PRACTITIONER

## 2019-09-10 PROCEDURE — 84165 PROTEIN E-PHORESIS SERUM: CPT | Mod: ZL | Performed by: NURSE PRACTITIONER

## 2019-09-12 ENCOUNTER — HOSPITAL ENCOUNTER (EMERGENCY)
Facility: HOSPITAL | Age: 69
Discharge: HOME | End: 2019-09-12
Attending: INTERNAL MEDICINE | Admitting: INTERNAL MEDICINE
Payer: MEDICARE

## 2019-09-12 VITALS
TEMPERATURE: 98.2 F | RESPIRATION RATE: 16 BRPM | SYSTOLIC BLOOD PRESSURE: 134 MMHG | OXYGEN SATURATION: 96 % | DIASTOLIC BLOOD PRESSURE: 84 MMHG

## 2019-09-12 DIAGNOSIS — H66.90 ACUTE OTITIS MEDIA, UNSPECIFIED OTITIS MEDIA TYPE: ICD-10-CM

## 2019-09-12 LAB
ALBUMIN SERPL ELPH-MCNC: 3.9 G/DL (ref 3.7–5.1)
ALPHA1 GLOB SERPL ELPH-MCNC: 0.4 G/DL (ref 0.2–0.4)
ALPHA2 GLOB SERPL ELPH-MCNC: 0.7 G/DL (ref 0.5–0.9)
B-GLOBULIN SERPL ELPH-MCNC: 0.6 G/DL (ref 0.6–1)
B2 MICROGLOB SERPL-MCNC: 4.2 MG/L
GAMMA GLOB SERPL ELPH-MCNC: 4.3 G/DL (ref 0.7–1.6)
IGA SERPL-MCNC: <7 MG/DL (ref 70–380)
IGG SERPL-MCNC: 5000 MG/DL (ref 695–1620)
IGM SERPL-MCNC: 8 MG/DL (ref 60–265)
KAPPA LC UR-MCNC: 1.51 MG/DL (ref 0.33–1.94)
KAPPA LC/LAMBDA SER: 5.59 {RATIO} (ref 0.26–1.65)
LAMBDA LC SERPL-MCNC: 0.27 MG/DL (ref 0.57–2.63)
M PROTEIN SERPL ELPH-MCNC: 4.3 G/DL
PROT PATTERN SERPL ELPH-IMP: ABNORMAL
PROT PATTERN SERPL IFE-IMP: NORMAL
VISC SER: 2.1 CP (ref 1.4–1.8)

## 2019-09-12 PROCEDURE — 25000132 ZZH RX MED GY IP 250 OP 250 PS 637: Performed by: INTERNAL MEDICINE

## 2019-09-12 PROCEDURE — 99283 EMERGENCY DEPT VISIT LOW MDM: CPT | Mod: Z6 | Performed by: INTERNAL MEDICINE

## 2019-09-12 PROCEDURE — 99283 EMERGENCY DEPT VISIT LOW MDM: CPT

## 2019-09-12 RX ORDER — OXYCODONE HYDROCHLORIDE 5 MG/1
5 TABLET ORAL EVERY 6 HOURS PRN
Qty: 6 TABLET | Refills: 0 | Status: SHIPPED | OUTPATIENT
Start: 2019-09-12 | End: 2019-09-20

## 2019-09-12 RX ADMIN — AMOXICILLIN AND CLAVULANATE POTASSIUM 1 TABLET: 875; 125 TABLET, FILM COATED ORAL at 04:06

## 2019-09-12 ASSESSMENT — ENCOUNTER SYMPTOMS
CONFUSION: 0
EYE REDNESS: 0
DIFFICULTY URINATING: 0
RHINORRHEA: 1
FEVER: 0
SHORTNESS OF BREATH: 0
HEADACHES: 0
ARTHRALGIAS: 0
COLOR CHANGE: 0
ABDOMINAL PAIN: 0
NECK STIFFNESS: 0
COUGH: 1

## 2019-09-12 NOTE — ED NOTES
Patient given verbal and written discharge instructions. Patient verbalized understanding of discharge instructions. Rx sent home with pt. Take home prescription oxycodone 5mg PO (6 tabs) 1 every 6 hours sent home with pt. Pt verbalized directions understanding. Rates ear pain 7/10. First dose of antibiotics given at this time.

## 2019-09-12 NOTE — ED NOTES
Patient presents to emergency room with c/o bilateral ear pain and concerned about her blood pressure being elevated. Pt woke up in the night with bilateral ear pain, fullness, and decrease in hearing. Pt has been checking her blood pressure at home using her 's blood pressure machine. Pt is concerned because at home her BP was 150's/80's. C/o scratchy throat and cough. Pt states  recently had a virus and now she has the same symptoms. Hx multiple myeloma d/t have chemotherapy on Tuesday. Afebrile. Denies SOB. Rates ear pain 7-8/10.

## 2019-09-12 NOTE — ED AVS SNAPSHOT
[facility]  [address] [address]  Phone:  [phone]                                    Carmelita Watts   MRN: [id number]    Department:  [facility]   Date of Visit:  9/12/2019           After Visit Summary Signature Page    I have received my discharge instructions, and my questions have been answered. I have discussed any challenges I see with this plan with the nurse or doctor.    ..........................................................................................................................................  Patient/Patient Representative Signature      ..........................................................................................................................................  Patient Representative Print Name and Relationship to Patient    ..................................................               ................................................  Date                                   Time    ..........................................................................................................................................  Reviewed by Signature/Title    ...................................................              ..............................................  Date                                               Time          22EPIC Rev 08/18

## 2019-09-13 ENCOUNTER — TELEPHONE (OUTPATIENT)
Dept: FAMILY MEDICINE | Facility: OTHER | Age: 69
End: 2019-09-13

## 2019-09-13 NOTE — TELEPHONE ENCOUNTER
" No ER follow-up needed at this time, pt reports hearing has returned. Pt verbalizes understanding to call triage back if she does not continue to improve and/or new or worsening symptoms. Pt verbalizes understanding and agrees to plan. Patient stated \"yeah I really feel like it's already starting to improve my with my ears and my upper respiratory infection, I don't think I need to see Kenyatta at this time\".  Pt also has f/u with Bonita Quintana.     Reason for ER/UC visit: acute otitis media, bilateral, URI  Can you explain to me in your own words what your main problem/concern is for your recent ER visit?: URI and ear pain  What did the medical team at the hospital tell you to watch out for to make sure you're ok?: no  Do you have any questions for me regarding your main diagnosis? Is there anything I can better explain to you?: no    New or Worsening symptoms:  Hearing has returned since starting antibiotics., improving       Prescription Received/Picked up from Pharmacy?: yes Any questions regarding your prescription?: denies pt taking antibiotics as prescribed.     Follow-up Results or Labs that are pending: no    Do you have any questions or concerns?: no    ER Recommends Follow-up By: f/u if needed by 16th per pt report.     RN Recommendations: at this time pt improving no f/u needed at this time.     Thank you for your time, if you start feeling worse, or have any further questions, please feel free to contact us again at [phone].  If needing immediate medical attention at any time please call 911/Go to the ER.     "

## 2019-09-13 NOTE — TELEPHONE ENCOUNTER
10:07 AM    Reason for Call: OVERBOOK    Patient is having the following symptoms: Hosp follow up ear pain for hosp f/u 3 days.    The patient is requesting an appointment for 09/16/2019 with JULIANA Trujillo.    Was an appointment offered for this call? Yes  If yes : Appointment type              Date    Preferred method for responding to this message: Telephone Call  What is your phone number ?[phone]    If we cannot reach you directly, may we leave a detailed response at the number you provided? Yes    Can this message wait until your PCP/provider returns, if unavailable today? Mal[place],     Em Phillips

## 2019-09-13 NOTE — ED PROVIDER NOTES
History     Chief Complaint   Patient presents with     Otalgia     bilateral ear pain that woke her up in the night. hx multiple myleoma. pt concerned about her blood pressure being 150's/80's.  recently had a virus and pt thinks she caught the virus from her .      The history is provided by the patient.   Ear Problem   Location:  Bilateral  Behind ear:  No abnormality  Quality:  Aching and burning  Severity:  Moderate  Onset quality:  Gradual  Timing:  Constant  Progression:  Worsening  Chronicity:  New  Associated symptoms: cough and rhinorrhea    Associated symptoms: no abdominal pain, no congestion, no fever and no headaches          Allergies:  Allergies   Allergen Reactions     Lisinopril Cough     Phenylephrine Hcl Other (See Comments)     **Entex  HEADACHE (SEVERE)     Phenylpropanolamine Other (See Comments)     **Entex  HEADACHE (SEVERE)     Pseudoephedrine Tannate Other (See Comments)     **Entex  HEADACHE (SEVERE)     Levofloxacin Rash     **Levaquin     Moxifloxacin Hcl [Avelox] Rash       Problem List:    Patient Active Problem List    Diagnosis Date Noted     Multiple myeloma not having achieved remission (H) 06/24/2019     Priority: Medium     ACP (advance care planning) 10/26/2016     Priority: Medium     Advance Care Planning 10/26/2016: ACP Review of Chart / Resources Provided:  Reviewed chart for advance care plan.  Carmelita Watts has no plan or code status on file. Discussed available resources and provided with information. Confirmed code status reflects current choices pending further ACP discussions.  Confirmed/documented legally designated decision makers.  Added by Alison Landeros             Major depressive disorder, recurrent episode, mild (H) 10/01/2015     Priority: Medium     Seasonal allergies 10/01/2015     Priority: Medium     Mixed hyperlipidemia 10/01/2015     Priority: Medium     Hyperlipidemia LDL goal <130 07/05/2013     Priority: Medium     Hypertension goal  "BP (blood pressure) < 140/80 07/05/2013     Priority: Medium     Advanced care planning/counseling discussion 07/09/2012     Priority: Medium        Past Medical History:    Past Medical History:   Diagnosis Date     Arthritis      Depressive disorder      Essential hypertension 10/1/2015     Major depressive disorder, recurrent episode, mild (H) 10/1/2015     Mixed hyperlipidemia 10/1/2015     Multiple myeloma not having achieved remission (H) 6/24/2019     Other specified disorders of bladder 07/09/2012     Seasonal allergies 10/1/2015     Unspecified essential hypertension 03/19/2007     Unspecified sinusitis (chronic) 09/05/2007       Past Surgical History:    Past Surgical History:   Procedure Laterality Date     APPENDECTOMY       BONE MARROW BIOPSY, BONE SPECIMEN, NEEDLE/TROCAR N/A 6/18/2019    Procedure: BONE MARROW BIOPSY;  Surgeon: Maciej Sanz MD;  Location: [facility]     CHOLECYSTECTOMY       COLONOSCOPY  07-    repeat 10 years     COLONOSCOPY N/A 12/30/2016    Procedure: COLONOSCOPY;  Surgeon: Bhaskar Franklin DO;  Location: [facility]     SINUS SURGERY       TUBAL STERILIZATION         Family History:    Family History   Problem Relation Age of Onset     Breast Cancer Mother 66        Cause of Death     Parkinsonism Father         \"Possible\"     Coronary Artery Disease Father      Pacemaker Father      Thyroid Disease Daughter      Diabetes No family hx of      Hypertension No family hx of      Hyperlipidemia No family hx of      Cerebrovascular Disease No family hx of      Colon Cancer No family hx of      Prostate Cancer No family hx of      Genetic Disorder No family hx of      Asthma No family hx of      Anesthesia Reaction No family hx of        Social History:  Marital Status:   [2]  Social History     Tobacco Use     Smoking status: Never Smoker     Smokeless tobacco: Never Used     Tobacco comment: Tried to Quit (YES); QUIT in 1971; Passive Exposure (NO)   Substance Use Topics     " Alcohol use: Yes     Comment: RARELY     Drug use: No        Medications:      acyclovir (ZOVIRAX) 400 MG tablet   amoxicillin-clavulanate (AUGMENTIN) 875-125 MG tablet   atorvastatin (LIPITOR) 10 MG tablet   citalopram (CELEXA) 20 MG tablet   losartan (COZAAR) 50 MG tablet   oxyCODONE (ROXICODONE) 5 MG tablet   dexamethasone (DECADRON) 4 MG tablet   EPINEPHrine (EPIPEN) 0.3 MG/0.3ML injection   fluticasone (FLONASE) 50 MCG/ACT nasal spray   LORazepam (ATIVAN) 0.5 MG tablet   prochlorperazine (COMPAZINE) 10 MG tablet         Review of Systems   Constitutional: Negative for fever.   HENT: Positive for ear pain and rhinorrhea. Negative for congestion.    Eyes: Negative for redness.   Respiratory: Positive for cough. Negative for shortness of breath.    Cardiovascular: Negative for chest pain.   Gastrointestinal: Negative for abdominal pain.   Genitourinary: Negative for difficulty urinating.   Musculoskeletal: Negative for arthralgias and neck stiffness.   Skin: Negative for color change.   Neurological: Negative for headaches.   Psychiatric/Behavioral: Negative for confusion.   All other systems reviewed and are negative.      Physical Exam   BP: 134/84  Heart Rate: 82  Temp: 98.2  F (36.8  C)  Resp: 16  SpO2: 96 %      Physical Exam   Constitutional: She is oriented to person, place, and time. She appears well-developed and well-nourished. No distress.   HENT:   Head: Normocephalic and atraumatic.   Right Ear: Tympanic membrane is injected. Tympanic membrane is not perforated.   Left Ear: Tympanic membrane is injected, erythematous and bulging. Tympanic membrane is not perforated.   Mouth/Throat: Oropharynx is clear and moist. No oropharyngeal exudate.   Eyes: Pupils are equal, round, and reactive to light. Conjunctivae are normal. No scleral icterus.   Neck: Normal range of motion. Neck supple. No JVD present. No tracheal deviation present. No thyromegaly present.   Cardiovascular: Normal rate, regular rhythm,  normal heart sounds and intact distal pulses. Exam reveals no gallop and no friction rub.   No murmur heard.  Pulmonary/Chest: Effort normal and breath sounds normal. No stridor. No respiratory distress. She has no wheezes. She has no rales. She exhibits no tenderness.   Abdominal: Soft. Bowel sounds are normal. She exhibits no distension and no mass. There is no tenderness. There is no rebound and no guarding.   Musculoskeletal: Normal range of motion. She exhibits no edema or tenderness.        Cervical back: She exhibits no tenderness.        Thoracic back: She exhibits no tenderness.        Lumbar back: She exhibits no tenderness.   Lymphadenopathy:     She has no cervical adenopathy.   Neurological: She is alert and oriented to person, place, and time.   Skin: Skin is warm and dry. No rash noted. She is not diaphoretic. No erythema. No pallor.   Psychiatric: Her behavior is normal.   Nursing note and vitals reviewed.      ED Course        Procedures                   No results found for this or any previous visit (from the past 24 hour(s)).    Medications   amoxicillin-clavulanate (AUGMENTIN) 875-125 MG per tablet 1 tablet (1 tablet Oral Given 9/12/19 0406)       Assessments & Plan (with Medical Decision Making)   Had URI symptoms for 2-3, developed L ear pain since last night   Left otitis media, R tympan membrane also slightly erythematous  Augmentin started, I explained to her that she due to her immunocompromised state she is at much higher risk for developing life threatening infection , I advised her if her symptoms got worse, had fever, chills , vomiting or any other unusual symptoms return to ER  She voiced understanding and agreed with plan     I have reviewed the nursing notes.    I have reviewed the findings, diagnosis, plan and need for follow up with the patient.      Discharge Medication List as of 9/12/2019  3:59 AM      START taking these medications    Details   amoxicillin-clavulanate  (AUGMENTIN) 875-125 MG tablet Take 1 tablet by mouth 3 times daily for 7 days, Disp-21 tablet, R-0, Local Print      oxyCODONE (ROXICODONE) 5 MG tablet Take 1 tablet (5 mg) by mouth every 6 hours as needed for pain, Disp-6 tablet, R-0, Local Print             Final diagnoses:   Acute otitis media, unspecified otitis media type       9/12/2019   [facility]     Rogers Mondragon MD  09/12/19 9123

## 2019-09-15 NOTE — PROGRESS NOTES
Oncology Follow-up Visit:  September 17, 2019    Reason for Visit:  Patient presents with:  RECHECK: multiple myeloma     Nursing Note and documentation reviewed: yes    HPI:   This is a 68-year-old female patient who presents to the oncology clinic today for evaluation prior to receiving cycle 5 chemotherapy for Stage II-RISS IgG multiple myeloma diagnosed June 2019.  She is currently undergoing treatment with Velcade, dexamethasone.      She presents to the clinic today complaining of her ears feeling full and that she cannot hear well.  She was evaluated in the emergency room on 9/12 and diagnosed with a left otitis media and placed on Augmentin for 7 days.  Patient states she will complete this prescription tomorrow.  She has had no fevers and states the left ear does continue to have some discomfort but it is much improved from when she went into the ER.  She does have some nasal congestion and has been using her [place] pot.  She states she had no sore throat and did have some chest congestion but that is much improved and she denies any shortness of breath.  She states she will uses Zyrtec-D on occasion that she states does not seem to do much.    In regards to her treatments, she states she feels she continues to tolerate them fairly well.  She does have some fatigue.    Oncologic History:     12/31/2018   patient presented to the emergency room with vertigo and fatigue.  CT scan of the head was negative and subsequent stress test was negative.  5/3/2019  She was seen by her PCP who ordered lab work and noted a total protein of 12.9.  SPEP at that time showed an M spike of 6.2 with a large monoclonal protein seen in the gamma fraction.  Urine immunofixation showed a possible small protein band in the gamma fraction  5/31/2019  she was evaluated by Dr. Walker with Medical Oncology with plan to rule out myeloma and obtain bone marrow aspiration biopsy as well as a metastatic bone survey along with additional  labwork  6/18/2019 she underwent bone marrow aspiration and biopsy  6/24/2019  She was seen again by Dr. Walker and CBC showed a hemoglobin of 9.3, M spike 7.3 with monoclonal IgG immunoglobulin of kappa light chain type; serum viscosity was 2.9; quantitative immunoglobulins showed an IgG of 8160, beta-2 microglobulin was 5.8, BUN was 21 with creatinine is 0.8 and total protein was 13.7.  Quantitative kappa/lambda free light chains showed an elevated ratio of 17.0 bone marrow aspiration biopsy showed plasma cell myeloma with approximately 80% plasma cells.  Immunofixation showed IgG kappa and flow cytometry revealed kappa monotypic plasma cells consistent with clonal plasma cell neoplasm and FISH panel was pending at that time.  It was felt she had at least stage II disease based on her beta-2 microglobulin and anemia.  Plan was to treat with Velcade 1.3 mg per metered squared days 1, 4, 8 and 11/Decadron 40 mg on days 1, 8 and 15 initiation of Revlimid with the second cycle at 25 mg daily days 1 through 14.  Plan was to also obtain an MRI of the lumbar spine to rule out lytic lesion at L3.  She was initiated on Zovirax 400 mg p.o. twice daily.  6/25/2019  1st cycle of chemotherapy initiated  7/1/2019 note in chart regarding patient's large co-pay for the Revlimid and no plan at this point to initiate Revlimid and treat only with Velcade and Decadron per Dr. Walker  7/11/2019  MRI lumbar spine shows a pathologic superior endplate compression fracture at L3 without evidence of retropulsed fragment and innumerable enhancing lesions throughout the lumbar spine consistent with history of multiple myeloma.     Current Chemo Regime/TX: Velcade 1.3 mg per metered squared on days 1, 4, 8 and 11 with Decadron 40 mg on days 1, 8 and 15     **Zometa 4mg every 3 months  Current Cycle: 5  # of completed cycles:  4     Previous treatment:  n/a    Past Medical History:   Diagnosis Date     Arthritis      Depressive disorder   "    Essential hypertension 10/1/2015     Major depressive disorder, recurrent episode, mild (H) 10/1/2015     Mixed hyperlipidemia 10/1/2015     Multiple myeloma not having achieved remission (H) 6/24/2019     Other specified disorders of bladder 07/09/2012    Irritable Bladder     Seasonal allergies 10/1/2015     Unspecified essential hypertension 03/19/2007     Unspecified sinusitis (chronic) 09/05/2007       Past Surgical History:   Procedure Laterality Date     APPENDECTOMY       BONE MARROW BIOPSY, BONE SPECIMEN, NEEDLE/TROCAR N/A 6/18/2019    Procedure: BONE MARROW BIOPSY;  Surgeon: Maciej Sanz MD;  Location: [facility]     CHOLECYSTECTOMY       COLONOSCOPY  07-    repeat 10 years     COLONOSCOPY N/A 12/30/2016    Procedure: COLONOSCOPY;  Surgeon: Bhaskar Franklin DO;  Location: [facility]     SINUS SURGERY       TUBAL STERILIZATION         Family History   Problem Relation Age of Onset     Breast Cancer Mother 66        Cause of Death     Parkinsonism Father         \"Possible\"     Coronary Artery Disease Father      Pacemaker Father      Thyroid Disease Daughter      Diabetes No family hx of      Hypertension No family hx of      Hyperlipidemia No family hx of      Cerebrovascular Disease No family hx of      Colon Cancer No family hx of      Prostate Cancer No family hx of      Genetic Disorder No family hx of      Asthma No family hx of      Anesthesia Reaction No family hx of        Social History     Socioeconomic History     Marital status:      Spouse name: Not on file     Number of children: Not on file     Years of education: Not on file     Highest education level: Not on file   Occupational History     Occupation: Financial     Comment:  - ([employer])   Social Needs     Financial resource strain: Not on file     Food insecurity:     Worry: Not on file     Inability: Not on file     Transportation needs:     Medical: Not on file     Non-medical: Not on file   Tobacco Use     " Smoking status: Never Smoker     Smokeless tobacco: Never Used     Tobacco comment: Tried to Quit (YES); QUIT in 1971; Passive Exposure (NO)   Substance and Sexual Activity     Alcohol use: Yes     Comment: RARELY     Drug use: No     Sexual activity: Yes     Partners: Male     Birth control/protection: None   Lifestyle     Physical activity:     Days per week: Not on file     Minutes per session: Not on file     Stress: Not on file   Relationships     Social connections:     Talks on phone: Not on file     Gets together: Not on file     Attends Christian service: Not on file     Active member of club or organization: Not on file     Attends meetings of clubs or organizations: Not on file     Relationship status: Not on file     Intimate partner violence:     Fear of current or ex partner: Not on file     Emotionally abused: Not on file     Physically abused: Not on file     Forced sexual activity: Not on file   Other Topics Concern      Service Not Asked     Blood Transfusions Not Asked     Caffeine Concern Yes     Comment: Coffee >6 cups daily     Occupational Exposure Not Asked     Hobby Hazards Not Asked     Sleep Concern Not Asked     Stress Concern Not Asked     Weight Concern Not Asked     Special Diet Not Asked     Back Care Not Asked     Exercise Not Asked     Bike Helmet Not Asked     Seat Belt Not Asked     Self-Exams Not Asked     Parent/sibling w/ CABG, MI or angioplasty before 65F 55M? No   Social History Narrative     Not on file       Current Outpatient Medications   Medication     acyclovir (ZOVIRAX) 400 MG tablet     amoxicillin-clavulanate (AUGMENTIN) 875-125 MG tablet     atorvastatin (LIPITOR) 10 MG tablet     citalopram (CELEXA) 20 MG tablet     dexamethasone (DECADRON) 4 MG tablet     EPINEPHrine (EPIPEN) 0.3 MG/0.3ML injection     fluticasone (FLONASE) 50 MCG/ACT nasal spray     LORazepam (ATIVAN) 0.5 MG tablet     losartan (COZAAR) 50 MG tablet     prochlorperazine (COMPAZINE) 10 MG  "tablet     No current facility-administered medications for this visit.         Allergies   Allergen Reactions     Lisinopril Cough     Phenylephrine Hcl Other (See Comments)     **Entex  HEADACHE (SEVERE)     Phenylpropanolamine Other (See Comments)     **Entex  HEADACHE (SEVERE)     Pseudoephedrine Tannate Other (See Comments)     **Entex  HEADACHE (SEVERE)     Levofloxacin Rash     **Levaquin     Moxifloxacin Hcl [Avelox] Rash       Review Of Systems:  Constitutional:    denies fever, chills, and night sweats.  Eyes:    denies blurred or double vision  Ears/Nose/Throat:   See HPI  Respiratory:   See HPI  Skin:   denies rash, lesions  Cardiovascular:   denies chest pain, palpitations, edema  Gastrointestinal:   denies abdominal pain, bloating, nausea, early satiety; no change in bowel habits or blood in stool  Genitourinary:   denies difficulty with urination, blood in urine      ECOG Performance Status: 1    Physical Exam:  /62   Pulse 99   Temp 98.8  F (37.1  C) (Tympanic)   Ht 1.6 m (5' 3\")   Wt 68.3 kg (150 lb 9.6 oz)   SpO2 94%   BMI 26.68 kg/m      GENERAL APPEARANCE: Healthy, alert and in no acute distress.  HEENT: Normocephalic, Sclerae anicteric. Oropharynx without ulcers, lesions, or thrush.  Left TM with some erythema.  NECK:   No asymmetry or masses, no thyromegaly.  LYMPHATICS: No palpable cervical, supraclavicular, axillary, or inguinal nodes   RESP: Lungs clear to auscultation bilaterally, respirations regular and easy  CARDIOVASCULAR: Regular rate and rhythm. Normal S1, S2  ABDOMEN: Soft, nontender. Bowel sounds auscultated all 4 quadrants. No palpable organomegaly or masses.  MUSCULOSKELETAL: Extremities without gross deformities noted. No edema of bilateral lower extremities.  NEURO: Alert and oriented x 3.  Gait steady.  PSYCHIATRIC: Mentation and affect appear normal.  Mood appropriate.    Laboratory:  Results for orders placed or performed in visit on 09/10/19   Kappa and lambda " light chain   Result Value Ref Range    Kappa Free Lt Chain 1.51 0.33 - 1.94 mg/dL    Lambda Free Lt Chain 0.27 (L) 0.57 - 2.63 mg/dL    Kappa Lambda Ratio 5.59 (H) 0.26 - 1.65   Macroglobulins   Result Value Ref Range    Viscosity Index 2.1 (H) 1.4 - 1.8   Lactate Dehydrogenase   Result Value Ref Range    Lactate Dehydrogenase 156 81 - 234 U/L   Beta 2 microglobulin   Result Value Ref Range    Beta-2-Microglobulin 4.2 (H) <2.3 mg/L   Immunoglobulins A G and M   Result Value Ref Range    IGG 5,000 (H) 695 - 1,620 mg/dL    IGA <7 (L) 70 - 380 mg/dL    IGM 8 (L) 60 - 265 mg/dL   ELP reflex to IELP   Result Value Ref Range    Albumin Fraction 3.9 3.7 - 5.1 g/dL    Alpha 1 Fraction 0.4 0.2 - 0.4 g/dL    Alpha 2 Fraction 0.7 0.5 - 0.9 g/dL    Beta Fraction 0.6 0.6 - 1.0 g/dL    Gamma Fraction 4.3 (H) 0.7 - 1.6 g/dL    Monoclonal Peak 4.3 (H) 0.0 g/dL    ELP Interpretation:       Large monoclonal protein (about 4.3 g/dL) seen in the gamma fraction. See immunofixation   report on same specimen. Pathologic significance requires clinical correlation.  BIA Izaguirre M.D., Ph.D., Pathologist ().      Immunofixation ELP   Result Value Ref Range    Immunofixation ELP (Note)      Component Value Flag Ref Range Units Status Collected Lab   WBC 6.7   4.0 - 11.0 10e9/L Final 09/17/2019  8:15 AM [facility]   RBC Count 2.79  Low   3.8 - 5.2 10e12/L Final 09/17/2019  8:15 AM [facility]   Hemoglobin 8.7  Low   11.7 - 15.7 g/dL Final 09/17/2019  8:15 AM [facility]   Hematocrit 26.2  Low   35.0 - 47.0 % Final 09/17/2019  8:15 AM [facility]   MCV 94   78 - 100 fl Final 09/17/2019  8:15 AM [facility]   MCH 31.2   26.5 - 33.0 pg Final 09/17/2019  8:15 AM [facility]   MCHC 33.2   31.5 - 36.5 g/dL Final 09/17/2019  8:15 AM [facility]   RDW 15.7  High   10.0 - 15.0 % Final 09/17/2019  8:15 AM [facility]   Platelet Count 333   150 - 450 10e9/L Final 09/17/2019  8:15 AM [facility]   Diff Method     Final 09/17/2019  8:15 AM [facility]   Manual Differential    % Neutrophils 55.0    % Final 09/17/2019  8:15 AM  [facility]   % Lymphocytes 26.0    % Final 09/17/2019  8:15 AM [facility]   % Monocytes 11.0    % Final 09/17/2019  8:15 AM [facility]   % Eosinophils 2.0    % Final 09/17/2019  8:15 AM [facility]   % Basophils 1.0    % Final 09/17/2019  8:15 AM [facility]   % Band 4.0    % Final 09/17/2019  8:15 AM [facility]   % Myelocytes 1.0    % Final 09/17/2019  8:15 AM [facility]   Absolute Neutrophil 3.7   1.6 - 8.3 10e9/L Final 09/17/2019  8:15 AM [facility]   Absolute Lymphocytes 1.7   0.8 - 5.3 10e9/L Final 09/17/2019  8:15 AM [facility]   Absolute Monocytes 0.7   0.0 - 1.3 10e9/L Final 09/17/2019  8:15 AM [facility]   Absolute Eosinophils 0.1   0.0 - 0.7 10e9/L Final 09/17/2019  8:15 AM [facility]   Absolute Basophils 0.1   0.0 - 0.2 10e9/L Final 09/17/2019  8:15 AM [facility]   Absolute Bands 0.3   0.0 - 0.6 10e9/L Final 09/17/2019  8:15 AM [facility]   Absolute Myelocytes 0.1  High   0 10e9/L Final 09/17/2019  8:15 AM [facility]   Anisocytosis Slight     Final 09/17/2019  8:15 AM [facility]   RBC Morphology     Final 09/17/2019  8:15 AM [facility]   Consistent with reported results    Platelet Estimate     Final 09/17/2019  8:15 AM [facility]   Automated count confirmed.  Platelet morphology is normal.      Component Value Flag Ref Range Units Status Collected Lab   Sodium 136   133 - 144 mmol/L Final 09/17/2019  8:15 AM [facility]   Potassium 3.7   3.4 - 5.3 mmol/L Final 09/17/2019  8:15 AM [facility]   Chloride 103   94 - 109 mmol/L Final 09/17/2019  8:15 AM [facility]   Carbon Dioxide 26   20 - 32 mmol/L Final 09/17/2019  8:15 AM [facility]   Anion Gap 7   3 - 14 mmol/L Final 09/17/2019  8:15 AM [facility]   Glucose 114  High   70 - 99 mg/dL Final 09/17/2019  8:15 AM [facility]   Urea Nitrogen 12   7 - 30 mg/dL Final 09/17/2019  8:15 AM [facility]   Creatinine 0.69   0.52 - 1.04 mg/dL Final 09/17/2019  8:15 AM [facility]   GFR Estimate 88   >60 mL/min/ Final 09/17/2019  8:15 AM [facility]   Comment:   Non  GFR Calc   Starting 12/18/2018, serum creatinine based estimated GFR (eGFR) will be   calculated using the Chronic Kidney Disease Epidemiology Collaboration   (CKD-EPI) equation.    GFR Estimate If  Black >90   >60 mL/min/ Final 09/17/2019  8:15 AM [facility]   Comment:    GFR Calc   Starting 12/18/2018, serum creatinine based estimated GFR (eGFR) will be   calculated using the Chronic Kidney Disease Epidemiology Collaboration   (CKD-EPI) equation.    Calcium 8.6   8.5 - 10.1 mg/dL Final 09/17/2019  8:15 AM [facility]   Bilirubin Total 0.3   0.2 - 1.3 mg/dL Final 09/17/2019  8:15 AM [facility]   Albumin 2.9  Low   3.4 - 5.0 g/dL Final 09/17/2019  8:15 AM [facility]   Protein Total 10.1  High   6.8 - 8.8 g/dL Final 09/17/2019  8:15 AM [facility]   Alkaline Phosphatase 79   40 - 150 U/L Final 09/17/2019  8:15 AM [facility]   ALT 19   0 - 50 U/L Final 09/17/2019  8:15 AM [facility]   AST 15   0 - 45 U/L Final 09/17/2019  8:15 AM        Imaging Studies:  None completed for today's visit       ASSESSMENT/PLAN:    #1 Multiple myeloma:   IgG kappa multiple myeloma with 80% involvement of the bone marrow diagnosed June 2019 and currently undergoing therapy with Velcade and Decadron and has received 4 cycles to date.  Revlimid not given r/t cost.  Now with rising M-spike and ratio.  We will have her follow-up with Dr. Walker  on Friday to discuss treatment plan.    #2 myeloma lytic lesion:  Will be due for Zometa again in October.    #3  Left otitis media: We will extend the Augmentin prescription and additional 3 days taking this twice a day.  I also recommended Flonase nasal spray daily along with an antihistamine daily.  She will follow-up with her PCP.     I encouraged patient to call with any questions or concerns.       Barbie Quintana, NP  APRN, FNP-BC, AOCNP

## 2019-09-17 ENCOUNTER — APPOINTMENT (OUTPATIENT)
Dept: LAB | Facility: OTHER | Age: 69
End: 2019-09-17
Attending: INTERNAL MEDICINE
Payer: MEDICARE

## 2019-09-17 ENCOUNTER — ONCOLOGY VISIT (OUTPATIENT)
Dept: ONCOLOGY | Facility: OTHER | Age: 69
End: 2019-09-17
Attending: NURSE PRACTITIONER
Payer: MEDICARE

## 2019-09-17 ENCOUNTER — INFUSION THERAPY VISIT (OUTPATIENT)
Dept: INFUSION THERAPY | Facility: OTHER | Age: 69
End: 2019-09-17
Attending: INTERNAL MEDICINE
Payer: COMMERCIAL

## 2019-09-17 VITALS
OXYGEN SATURATION: 94 % | BODY MASS INDEX: 26.68 KG/M2 | SYSTOLIC BLOOD PRESSURE: 116 MMHG | HEIGHT: 63 IN | HEART RATE: 99 BPM | DIASTOLIC BLOOD PRESSURE: 62 MMHG | TEMPERATURE: 98.8 F | WEIGHT: 150.6 LBS

## 2019-09-17 DIAGNOSIS — C90.00 MULTIPLE MYELOMA NOT HAVING ACHIEVED REMISSION (H): Primary | ICD-10-CM

## 2019-09-17 DIAGNOSIS — M89.9 BONE LESION: ICD-10-CM

## 2019-09-17 DIAGNOSIS — H66.90 ACUTE OTITIS MEDIA, UNSPECIFIED OTITIS MEDIA TYPE: ICD-10-CM

## 2019-09-17 DIAGNOSIS — Z53.9 ERRONEOUS ENCOUNTER--DISREGARD: ICD-10-CM

## 2019-09-17 LAB
ALBUMIN SERPL-MCNC: 2.9 G/DL (ref 3.4–5)
ALP SERPL-CCNC: 79 U/L (ref 40–150)
ALT SERPL W P-5'-P-CCNC: 19 U/L (ref 0–50)
ANION GAP SERPL CALCULATED.3IONS-SCNC: 7 MMOL/L (ref 3–14)
ANISOCYTOSIS BLD QL SMEAR: SLIGHT
AST SERPL W P-5'-P-CCNC: 15 U/L (ref 0–45)
BASOPHILS # BLD AUTO: 0.1 10E9/L (ref 0–0.2)
BASOPHILS NFR BLD AUTO: 1 %
BILIRUB SERPL-MCNC: 0.3 MG/DL (ref 0.2–1.3)
BUN SERPL-MCNC: 12 MG/DL (ref 7–30)
CALCIUM SERPL-MCNC: 8.6 MG/DL (ref 8.5–10.1)
CHLORIDE SERPL-SCNC: 103 MMOL/L (ref 94–109)
CO2 SERPL-SCNC: 26 MMOL/L (ref 20–32)
CREAT SERPL-MCNC: 0.69 MG/DL (ref 0.52–1.04)
DIFFERENTIAL METHOD BLD: ABNORMAL
EOSINOPHIL # BLD AUTO: 0.1 10E9/L (ref 0–0.7)
EOSINOPHIL NFR BLD AUTO: 2 %
ERYTHROCYTE [DISTWIDTH] IN BLOOD BY AUTOMATED COUNT: 15.7 % (ref 10–15)
GFR SERPL CREATININE-BSD FRML MDRD: 88 ML/MIN/{1.73_M2}
GLUCOSE SERPL-MCNC: 114 MG/DL (ref 70–99)
HCT VFR BLD AUTO: 26.2 % (ref 35–47)
HGB BLD-MCNC: 8.7 G/DL (ref 11.7–15.7)
LYMPHOCYTES # BLD AUTO: 1.7 10E9/L (ref 0.8–5.3)
LYMPHOCYTES NFR BLD AUTO: 26 %
MCH RBC QN AUTO: 31.2 PG (ref 26.5–33)
MCHC RBC AUTO-ENTMCNC: 33.2 G/DL (ref 31.5–36.5)
MCV RBC AUTO: 94 FL (ref 78–100)
MONOCYTES # BLD AUTO: 0.7 10E9/L (ref 0–1.3)
MONOCYTES NFR BLD AUTO: 11 %
MYELOCYTES # BLD: 0.1 10E9/L
MYELOCYTES NFR BLD MANUAL: 1 %
NEUTROPHILS # BLD AUTO: 3.7 10E9/L (ref 1.6–8.3)
NEUTROPHILS NFR BLD AUTO: 55 %
NEUTS BAND # BLD AUTO: 0.3 10E9/L (ref 0–0.6)
NEUTS BAND NFR BLD MANUAL: 4 %
PLATELET # BLD AUTO: 333 10E9/L (ref 150–450)
PLATELET # BLD EST: ABNORMAL 10*3/UL
POTASSIUM SERPL-SCNC: 3.7 MMOL/L (ref 3.4–5.3)
PROT SERPL-MCNC: 10.1 G/DL (ref 6.8–8.8)
RBC # BLD AUTO: 2.79 10E12/L (ref 3.8–5.2)
RBC MORPH BLD: ABNORMAL
SODIUM SERPL-SCNC: 136 MMOL/L (ref 133–144)
WBC # BLD AUTO: 6.7 10E9/L (ref 4–11)

## 2019-09-17 PROCEDURE — 80053 COMPREHEN METABOLIC PANEL: CPT | Mod: ZL | Performed by: NURSE PRACTITIONER

## 2019-09-17 PROCEDURE — 85025 COMPLETE CBC W/AUTO DIFF WBC: CPT | Mod: ZL | Performed by: NURSE PRACTITIONER

## 2019-09-17 PROCEDURE — G0463 HOSPITAL OUTPT CLINIC VISIT: HCPCS

## 2019-09-17 PROCEDURE — 36415 COLL VENOUS BLD VENIPUNCTURE: CPT | Mod: ZL | Performed by: NURSE PRACTITIONER

## 2019-09-17 PROCEDURE — 99214 OFFICE O/P EST MOD 30 MIN: CPT | Performed by: NURSE PRACTITIONER

## 2019-09-17 ASSESSMENT — PATIENT HEALTH QUESTIONNAIRE - PHQ9: SUM OF ALL RESPONSES TO PHQ QUESTIONS 1-9: 2

## 2019-09-17 ASSESSMENT — MIFFLIN-ST. JEOR: SCORE: 1172.25

## 2019-09-17 ASSESSMENT — PAIN SCALES - GENERAL: PAINLEVEL: MILD PAIN (2)

## 2019-09-17 NOTE — NURSING NOTE
"Chief Complaint   Patient presents with     RECHECK     multiple myeloma       Initial /62   Pulse 99   Temp 98.8  F (37.1  C) (Tympanic)   Ht 1.6 m (5' 3\")   Wt 68.3 kg (150 lb 9.6 oz)   SpO2 94%   BMI 26.68 kg/m   Estimated body mass index is 26.68 kg/m  as calculated from the following:    Height as of this encounter: 1.6 m (5' 3\").    Weight as of this encounter: 68.3 kg (150 lb 9.6 oz).  Medication Reconciliation: complete   Phq=2; acd on file; pain=2; immunizations reviewed  Elena Holcomb LPN  "

## 2019-09-17 NOTE — PROGRESS NOTES
Message out to Bonita Quintana NP asking how to manage plan for today, since we were alerted patient was not being treated. Per Bonita, canshe. Plan updated.    This encounter was opened in error. Please disregard.

## 2019-09-17 NOTE — PATIENT INSTRUCTIONS
We would like to see you back with Dr. Walker on Friday to discuss treatment.      Your prescription for Augmentin has been [facility] to: [facility].      If you have any questions please call [phone]    Other instructions:      Start Cetirizine (zyrtec) 10mg daily  Start Flonase nasal spray.    See Kenyatta Trujillo next week I follow up prior to chemotherapy.

## 2019-09-19 ASSESSMENT — PAIN SCALES - GENERAL: PAINLEVEL: NO PAIN (0)

## 2019-09-19 ASSESSMENT — MIFFLIN-ST. JEOR: SCORE: 1142.31

## 2019-09-20 ENCOUNTER — OFFICE VISIT (OUTPATIENT)
Dept: OTOLARYNGOLOGY | Facility: OTHER | Age: 69
End: 2019-09-20
Attending: OTOLARYNGOLOGY
Payer: MEDICARE

## 2019-09-20 ENCOUNTER — ONCOLOGY VISIT (OUTPATIENT)
Dept: ONCOLOGY | Facility: OTHER | Age: 69
End: 2019-09-20
Attending: INTERNAL MEDICINE
Payer: MEDICARE

## 2019-09-20 ENCOUNTER — OFFICE VISIT (OUTPATIENT)
Dept: AUDIOLOGY | Facility: OTHER | Age: 69
End: 2019-09-20
Attending: AUDIOLOGIST
Payer: MEDICARE

## 2019-09-20 VITALS
HEART RATE: 80 BPM | OXYGEN SATURATION: 99 % | TEMPERATURE: 97.9 F | HEIGHT: 63 IN | SYSTOLIC BLOOD PRESSURE: 112 MMHG | BODY MASS INDEX: 25.52 KG/M2 | WEIGHT: 144 LBS | DIASTOLIC BLOOD PRESSURE: 70 MMHG

## 2019-09-20 VITALS
BODY MASS INDEX: 25.52 KG/M2 | RESPIRATION RATE: 18 BRPM | OXYGEN SATURATION: 96 % | DIASTOLIC BLOOD PRESSURE: 60 MMHG | HEIGHT: 63 IN | WEIGHT: 144 LBS | TEMPERATURE: 98.1 F | SYSTOLIC BLOOD PRESSURE: 110 MMHG | HEART RATE: 94 BPM

## 2019-09-20 DIAGNOSIS — C90.00 MULTIPLE MYELOMA NOT HAVING ACHIEVED REMISSION (H): Primary | ICD-10-CM

## 2019-09-20 DIAGNOSIS — Z96.22 S/P MYRINGOTOMY WITH INSERTION OF TUBE: ICD-10-CM

## 2019-09-20 DIAGNOSIS — H90.6 MIXED CONDUCTIVE AND SENSORINEURAL HEARING LOSS OF BOTH EARS: Primary | ICD-10-CM

## 2019-09-20 DIAGNOSIS — H91.93 BILATERAL HEARING LOSS, UNSPECIFIED HEARING LOSS TYPE: Primary | ICD-10-CM

## 2019-09-20 DIAGNOSIS — Z92.21 STATUS POST CHEMOTHERAPY: ICD-10-CM

## 2019-09-20 DIAGNOSIS — H65.193 ACUTE OTITIS MEDIA WITH EFFUSION OF BOTH EARS: ICD-10-CM

## 2019-09-20 DIAGNOSIS — H69.93 ETD (EUSTACHIAN TUBE DYSFUNCTION), BILATERAL: ICD-10-CM

## 2019-09-20 DIAGNOSIS — H91.22 SUDDEN HEARING LOSS, LEFT: Primary | ICD-10-CM

## 2019-09-20 DIAGNOSIS — H69.93 DYSFUNCTION OF EUSTACHIAN TUBE, BILATERAL: ICD-10-CM

## 2019-09-20 DIAGNOSIS — C90.00 MULTIPLE MYELOMA NOT HAVING ACHIEVED REMISSION (H): ICD-10-CM

## 2019-09-20 PROCEDURE — 92567 TYMPANOMETRY: CPT | Performed by: AUDIOLOGIST

## 2019-09-20 PROCEDURE — 92557 COMPREHENSIVE HEARING TEST: CPT | Performed by: AUDIOLOGIST

## 2019-09-20 PROCEDURE — 99213 OFFICE O/P EST LOW 20 MIN: CPT | Performed by: INTERNAL MEDICINE

## 2019-09-20 PROCEDURE — G0463 HOSPITAL OUTPT CLINIC VISIT: HCPCS | Mod: 25,27

## 2019-09-20 PROCEDURE — 99203 OFFICE O/P NEW LOW 30 MIN: CPT | Mod: 25 | Performed by: OTOLARYNGOLOGY

## 2019-09-20 PROCEDURE — G0463 HOSPITAL OUTPT CLINIC VISIT: HCPCS | Mod: 25

## 2019-09-20 PROCEDURE — G0463 HOSPITAL OUTPT CLINIC VISIT: HCPCS

## 2019-09-20 PROCEDURE — 92511 NASOPHARYNGOSCOPY: CPT | Performed by: OTOLARYNGOLOGY

## 2019-09-20 PROCEDURE — 92504 EAR MICROSCOPY EXAMINATION: CPT | Performed by: OTOLARYNGOLOGY

## 2019-09-20 PROCEDURE — G0463 HOSPITAL OUTPT CLINIC VISIT: HCPCS | Mod: 27,25

## 2019-09-20 PROCEDURE — 69433 CREATE EARDRUM OPENING: CPT | Performed by: OTOLARYNGOLOGY

## 2019-09-20 RX ORDER — CYCLOPHOSPHAMIDE 50 MG/1
CAPSULE ORAL
Qty: 16 CAPSULE | Refills: 3 | Status: SHIPPED | OUTPATIENT
Start: 2019-09-20 | End: 2020-01-21

## 2019-09-20 RX ORDER — CIPROFLOXACIN AND DEXAMETHASONE 3; 1 MG/ML; MG/ML
4 SUSPENSION/ DROPS AURICULAR (OTIC) 2 TIMES DAILY
Qty: 7.5 ML | Refills: 0 | Status: SHIPPED | OUTPATIENT
Start: 2019-09-20 | End: 2019-09-30

## 2019-09-20 ASSESSMENT — MIFFLIN-ST. JEOR: SCORE: 1142.31

## 2019-09-20 ASSESSMENT — PATIENT HEALTH QUESTIONNAIRE - PHQ9: SUM OF ALL RESPONSES TO PHQ QUESTIONS 1-9: 4

## 2019-09-20 ASSESSMENT — PAIN SCALES - GENERAL: PAINLEVEL: NO PAIN (0)

## 2019-09-20 NOTE — LETTER
2019         RE: Carmelita Watts  [address] [facility] [address]        Dear Colleague,    Thank you for referring your patient, Carmelita Watts, to the [facility]. Please see a copy of my visit note below.    Otolaryngology Consultation    Patient: Carmelita Watts  : 1950    Patient presents with:  Ear Problem: Pt has been referred by Lilian for bilateral ear infections.  Bilateral ears are plugged.      HPI:  Carmelita Watts is a 68 year old female seen today for acute onset left hearing loss associated with left otalgia.  She does note a right hearing loss as well but her left ear is worse   She denies vertigo fluctuating hearing loss or bothersome tinnitus.  She has bilateral aural fullness      She is currently undergoing cycle 5 of chemotherapy for multiple myeloma which was recently diagnosed in .  She is undergoing treatment with Velcade and dexamethasone    She presented the emergency room on  for bilateral aural fullness and diagnosed with left otitis media.  She is placed on Augmentin for 7 days.  Her ear pain has improved but she still notices hearing loss    She is having significant problems hearing conversation.  Prior to  she was doing well with conversation and did not have a bothersome hearing loss.    She is occasional nasal congestion and uses dennys med saline as needed as well as as needed Zyrtec-D  Distant sinus surgery with me years ago which she states was wonderful she has no chronic congestion or sinusitis    Current Outpatient Rx   Medication Sig Dispense Refill     acyclovir (ZOVIRAX) 400 MG tablet Take 1 tablet (400 mg) by mouth 2 times daily Viral Prophylaxis. 60 tablet 5     amoxicillin-clavulanate (AUGMENTIN) 875-125 MG tablet Take 1 tablet by mouth 2 times daily 6 tablet 0     atorvastatin (LIPITOR) 10 MG tablet Take 1 tablet (10 mg) by mouth daily 90 tablet 3     citalopram (CELEXA) 20 MG tablet Take 1 tablet (20 mg) by  mouth daily 90 tablet 3     dexamethasone (DECADRON) 4 MG tablet Take 10 tablets (40 mg) by mouth once a week On days 1, 8, 15. 30 tablet 3     EPINEPHrine (EPIPEN) 0.3 MG/0.3ML injection Inject 0.3 mg into the muscle once as needed. Use as needed for anaphylaxis.       fluticasone (FLONASE) 50 MCG/ACT nasal spray SPRAY 2 SPRAYS INTO BOTH NOSTRILS DAILY 48 mL 0     LORazepam (ATIVAN) 0.5 MG tablet Take 1 tablet (0.5 mg) by mouth every 4 hours as needed (Anxiety, Nausea/Vomiting or Sleep) 30 tablet 3     losartan (COZAAR) 50 MG tablet Take 1 tablet (50 mg) by mouth daily 90 tablet 3     prochlorperazine (COMPAZINE) 10 MG tablet Take 1 tablet (10 mg) by mouth every 6 hours as needed (Nausea/Vomiting) 30 tablet 3       Allergies: Lisinopril; Phenylephrine hcl; Phenylpropanolamine; Pseudoephedrine tannate; Levofloxacin; and Moxifloxacin hcl [avelox]     Past Medical History:   Diagnosis Date     Arthritis      Depressive disorder      Essential hypertension 10/1/2015     Major depressive disorder, recurrent episode, mild (H) 10/1/2015     Mixed hyperlipidemia 10/1/2015     Multiple myeloma not having achieved remission (H) 6/24/2019     Other specified disorders of bladder 07/09/2012    Irritable Bladder     Seasonal allergies 10/1/2015     Unspecified essential hypertension 03/19/2007     Unspecified sinusitis (chronic) 09/05/2007       Past Surgical History:   Procedure Laterality Date     APPENDECTOMY       BONE MARROW BIOPSY, BONE SPECIMEN, NEEDLE/TROCAR N/A 6/18/2019    Procedure: BONE MARROW BIOPSY;  Surgeon: Maciej Sanz MD;  Location: [facility]     CHOLECYSTECTOMY       COLONOSCOPY  07-    repeat 10 years     COLONOSCOPY N/A 12/30/2016    Procedure: COLONOSCOPY;  Surgeon: Bhaskar Franklin DO;  Location: [facility]     SINUS SURGERY       TUBAL STERILIZATION         ENT family history reviewed    Social History     Tobacco Use     Smoking status: Never Smoker     Smokeless tobacco: Never Used     Tobacco  "comment: Tried to Quit (YES); QUIT in 1971; Passive Exposure (NO)   Substance Use Topics     Alcohol use: Yes     Comment: RARELY     Drug use: No       Review of Systems  ROS: 10 point ROS neg other than the symptoms noted above in the HPI and recent URI    Physical Exam  /70 (BP Location: Right arm, Cuff Size: Adult Regular)   Pulse 80   Temp 97.9  F (36.6  C) (Tympanic)   Ht 1.6 m (5' 3\")   Wt 65.3 kg (144 lb)   SpO2 99%   BMI 25.51 kg/m     General - The patient is well nourished and well developed, and appears to have good nutritional status.  Alert and oriented to person and place, answers questions and cooperates with examination appropriately.   Head and Face - Normocephalic and atraumatic, with no gross asymmetry noted.  The facial nerve is intact, with strong symmetric movements.  No spontaneous nystagmus  Voice and Breathing - The patient was breathing comfortably without the use of accessory muscles. There was no wheezing, stridor, or stertor.  The patients voice was clear and strong, and had appropriate pitch and quality.  No blanka peripheral digital clubbing or cyanosis   Ears -examined under microscopy bilaterally  The external auditory canals are patent, the tympanic membranes are dull bilaterally with effusion, no worrisome retraction or mass.  No erythema or bulging. Bony landmarks are intact.  Eyes - Extraocular movements intact, and the pupils were reactive to light.  Sclera were not icteric or injected, conjunctiva were pink and moist.  Mouth - Examination of the oral cavity showed pink, healthy oral mucosa. No lesions or ulcerations noted.  The tongue was mobile and midline, and the dentition were in good condition.    Throat - The walls of the oropharynx were smooth, pink, moist, symmetric, and had no lesions or ulcerations.  The tonsillar pillars and soft palate were symmetric.  The uvula was midline on elevation.    Neck - No palpable enlarged fixed cervical lymph nodes.  No neck " cysts or unusual tenderness to palpation.   No palpable fixed thyroid nodules or concerning goiter.  The trachea is grossly midline.   Nose - External contour is symmetric, no gross deflection or scars.  Nasal mucosa is pink and moist with no abnormal mucus.  The septum and turbinates were evaluated: non obstructive.  No polyps, masses, or purulence noted on examination.  Tuning fork exam reveals Pratt lateralizes to the right and Kirstie is negative in the right positive on the left    After informed consent was obtained and the nose was anesthetized with topical neosynepherine/lidocaine, the scope was advanced into the nares.  Maxillary antrostomy on the right is patent.   The middle turbinates are mildly edematous bilaterally.  there is no purulence and/ or excessive swelling.  Eustachian tubes are patent, no nasopharyngeal mass.    Audiogram reviewed which we were able to complete today and there is a bilateral moderate to profound mixed hearing loss she does have a significant conductive componentmplete today: Bilaterally SRT 65 dB in the right 60 dB in the left flat type B tympanograms bilaterally discrimination scores are fair    Bilateral Myringotomy with Tube Placement    Procedure - After discussion of the risks and benefits of myringotomy, informed consent was signed and placed in the chart.  I began with the right side.  I proceeded to position the patient in a supine position in the examination chair.  Using the binocular surgical microscope, I then proceeded to clean the canal of cerumen and squamous debris.  I was able to see the tympanic membrane.  I anesthetized the ear with 1% lidocaine with 1-100,000 of epinephrine.  After visualizing a good quoc, I then proceeded to use a myringotomy knife to make a radially oriented incision in the tympanic membrane.  A moderate amount of grade 3 glue effusion was suctioned away. I irrigated the ear with saline and gently suctioned the middle ear.  Next, I  proceeded to place a 1.27 duravent tube through the incision.  After confirming good positioning and a clearly visible open tube, otic drops were applied and a cotton ball was inserted into the canal.      I now moved on to the contralateral side. Using the binocular surgical microscope,  myringotomy with tube insertion was performed with similar findings and results.  On the left there is a larger grade 3 glue effusion.  Bilaterally the middle ear mucosa is edematous            Impression and Plan- Carmelita Watts is a 68 year old female with:    ICD-10-CM    1. Sudden hearing loss, left H91.22 AUDIOLOGY ADULT REFERRAL   2. Acute otitis media with effusion of both ears H65.193    3. ETD (Eustachian tube dysfunction), bilateral H69.83    4. Multiple myeloma not having achieved remission (H) C90.00    5. Status post chemotherapy Z92.21        A/P - The patient was given a prescription for ciprodex drops to use for 7 days.  The patient with then follow up in 4 weeks with repeat audiogram at that time.  The patient was instructed to call in or return sooner if there was any drainage from the ear.    This was all explained to Carlene.  She voices understanding    RTC in 1 month with audiogram and see Bridgette  RTC immediately for an audiogram with an sudden hearing loss in the future, particularly while on chemotherapy      Cha Ley D.O.  Otolaryngology/Head and Neck Surgery  Allergy      Again, thank you for allowing me to participate in the care of your patient.        Sincerely,        Cha Ley MD

## 2019-09-20 NOTE — PATIENT INSTRUCTIONS
Thank you for allowing Dr. Ley and our ENT team to participate in your care.  If your medications are too expensive, please give the nurse a call.  We can possibly change this medication.  If you have a scheduling or an appointment question please contact our Health Unit Coordinator at their direct line [phone].   ALL nursing questions or concerns can be directed to your ENT nurse at: [phone] Micah Elizabeth    Complete Audiogram; we will contact you with the results and plan accordingly    Continue Cory Med Nasal Irrigations Twice Daily and as needed      Instructions for Myringotomy Tubes ( Ear Tubes)    Recovery - The placement of ear tubes is a brief operation, and therefore the recovery from the anesthetic is usually less than a day.  However, in young children the sleep patterns, feeding, and behavior can be altered for several days.  Try to return to the daily routine as soon as possible and this issue will resolve without problems.  There are no restrictions to diet or activity after ear tube placement.    Medications - Children and adults can return to all preoperative medications after this procedure, including blood thinners.  You were sent home with ear drops, please use them as directed to assist in the rapid healing of the ear drum around the tube.  Pain medication may have been sent home with you, but a vast majority of the time, over the counter Tylenol or ibuprofen (advil) I sufficient. Finish prescription ear drops (4 drops twice a day).     Complications - A low grade fever (up to 100 degrees ) is not unusual in the day after tubes are placed.  Treat this with cool wash cloths to the forehead and Tylenol.  If the fever is higher, or does not respond to medication, call the Doctor s office or call service after hours.  A small amount of bloody drainage can occur for a day or two after ear tubes, and is perfectly normal, continue the ear drops as directed and it will clear up.    Water  Precautions - Recent clinical research has shown that absolute water precautions are not always necessary.  Ear plugs or water head bands are not necessary unless the ear is actively draining, or if your child does not like the sensation of water in the ear.    Follow up - Approximately 1 month after the tubes are placed I like to examine the ears to make sure there are no signs of complications, which are extremely rare.  You should already have an appointment in 1 month with ENT PA and audiology.  If not, call our office at [phone].  In some unusual cases the ears  reject  the tubes.  Depending on the situation, a hearing test may or may not be performed at that time.  Afterwards, follow up is done every 6 months, but of course earlier if there are any issues or problems.    Advantages of Tubes - After ear tube placement, there are certain benefits from having a direct communication of the middle ear space with the ear canal.  In the event of drainage from the ears with ear tubes in place ( which is common with colds and flus ) use the ear drops you were discharged home with using the same dosage and instructions.  This will clear up the ears without the need for oral antibiotics a majority of the time.  Another advantage is that with tubes in place, the ears automatically adjust to changes in atmospheric pressure ( such as in airplanes or elevation ).  In other words, if the tubes are open the ears will not hurt or pop!

## 2019-09-20 NOTE — NURSING NOTE
"Chief Complaint   Patient presents with     RECHECK     multiple myeloma       Initial /60   Pulse 94   Temp 98.1  F (36.7  C) (Tympanic)   Resp 18   Ht 1.6 m (5' 3\")   Wt 65.3 kg (144 lb)   SpO2 96%   BMI 25.51 kg/m   Estimated body mass index is 25.51 kg/m  as calculated from the following:    Height as of this encounter: 1.6 m (5' 3\").    Weight as of this encounter: 65.3 kg (144 lb).  Medication Reconciliation: complete.  Immunizations and advance directives status reviewed. Pain scale =0 , PHQ-9 = 4.            Vilma Chi LPN    "

## 2019-09-20 NOTE — PROGRESS NOTES
Audiology Evaluation Completed. Please refer SCANNED AUDIOGRAM and/or TYMPANOGRAM for BACKGROUND, RESULTS, RECOMMENDATIONS.      Bia BULLOCK, [facility], [place]-A  Audiologist #[id number]

## 2019-09-20 NOTE — PROGRESS NOTES
Otolaryngology Consultation    Patient: Carmelita Watts  : 1950    Patient presents with:  Ear Problem: Pt has been referred by Lilian for bilateral ear infections.  Bilateral ears are plugged.      HPI:  Carmelita Watts is a 68 year old female seen today for acute onset left hearing loss associated with left otalgia.  She does note a right hearing loss as well but her left ear is worse   She denies vertigo fluctuating hearing loss or bothersome tinnitus.  She has bilateral aural fullness      She is currently undergoing cycle 5 of chemotherapy for multiple myeloma which was recently diagnosed in .  She is undergoing treatment with Velcade and dexamethasone    She presented the emergency room on  for bilateral aural fullness and diagnosed with left otitis media.  She is placed on Augmentin for 7 days.  Her ear pain has improved but she still notices hearing loss    She is having significant problems hearing conversation.  Prior to  she was doing well with conversation and did not have a bothersome hearing loss.    She is occasional nasal congestion and uses dennys med saline as needed as well as as needed Zyrtec-D  Distant sinus surgery with me years ago which she states was wonderful she has no chronic congestion or sinusitis    Current Outpatient Rx   Medication Sig Dispense Refill     acyclovir (ZOVIRAX) 400 MG tablet Take 1 tablet (400 mg) by mouth 2 times daily Viral Prophylaxis. 60 tablet 5     amoxicillin-clavulanate (AUGMENTIN) 875-125 MG tablet Take 1 tablet by mouth 2 times daily 6 tablet 0     atorvastatin (LIPITOR) 10 MG tablet Take 1 tablet (10 mg) by mouth daily 90 tablet 3     citalopram (CELEXA) 20 MG tablet Take 1 tablet (20 mg) by mouth daily 90 tablet 3     dexamethasone (DECADRON) 4 MG tablet Take 10 tablets (40 mg) by mouth once a week On days 1, 8, 15. 30 tablet 3     EPINEPHrine (EPIPEN) 0.3 MG/0.3ML injection Inject 0.3 mg into the muscle once as needed. Use as  needed for anaphylaxis.       fluticasone (FLONASE) 50 MCG/ACT nasal spray SPRAY 2 SPRAYS INTO BOTH NOSTRILS DAILY 48 mL 0     LORazepam (ATIVAN) 0.5 MG tablet Take 1 tablet (0.5 mg) by mouth every 4 hours as needed (Anxiety, Nausea/Vomiting or Sleep) 30 tablet 3     losartan (COZAAR) 50 MG tablet Take 1 tablet (50 mg) by mouth daily 90 tablet 3     prochlorperazine (COMPAZINE) 10 MG tablet Take 1 tablet (10 mg) by mouth every 6 hours as needed (Nausea/Vomiting) 30 tablet 3       Allergies: Lisinopril; Phenylephrine hcl; Phenylpropanolamine; Pseudoephedrine tannate; Levofloxacin; and Moxifloxacin hcl [avelox]     Past Medical History:   Diagnosis Date     Arthritis      Depressive disorder      Essential hypertension 10/1/2015     Major depressive disorder, recurrent episode, mild (H) 10/1/2015     Mixed hyperlipidemia 10/1/2015     Multiple myeloma not having achieved remission (H) 6/24/2019     Other specified disorders of bladder 07/09/2012    Irritable Bladder     Seasonal allergies 10/1/2015     Unspecified essential hypertension 03/19/2007     Unspecified sinusitis (chronic) 09/05/2007       Past Surgical History:   Procedure Laterality Date     APPENDECTOMY       BONE MARROW BIOPSY, BONE SPECIMEN, NEEDLE/TROCAR N/A 6/18/2019    Procedure: BONE MARROW BIOPSY;  Surgeon: Maciej Sanz MD;  Location: [facility] OR     CHOLECYSTECTOMY       COLONOSCOPY  07-    repeat 10 years     COLONOSCOPY N/A 12/30/2016    Procedure: COLONOSCOPY;  Surgeon: Bhaskar Franklin DO;  Location: [facility] OR     SINUS SURGERY       TUBAL STERILIZATION         ENT family history reviewed    Social History     Tobacco Use     Smoking status: Never Smoker     Smokeless tobacco: Never Used     Tobacco comment: Tried to Quit (YES); QUIT in 1971; Passive Exposure (NO)   Substance Use Topics     Alcohol use: Yes     Comment: RARELY     Drug use: No       Review of Systems  ROS: 10 point ROS neg other than the symptoms noted above in the HPI  "and recent URI    Physical Exam  /70 (BP Location: Right arm, Cuff Size: Adult Regular)   Pulse 80   Temp 97.9  F (36.6  C) (Tympanic)   Ht 1.6 m (5' 3\")   Wt 65.3 kg (144 lb)   SpO2 99%   BMI 25.51 kg/m    General - The patient is well nourished and well developed, and appears to have good nutritional status.  Alert and oriented to person and place, answers questions and cooperates with examination appropriately.   Head and Face - Normocephalic and atraumatic, with no gross asymmetry noted.  The facial nerve is intact, with strong symmetric movements.  No spontaneous nystagmus  Voice and Breathing - The patient was breathing comfortably without the use of accessory muscles. There was no wheezing, stridor, or stertor.  The patients voice was clear and strong, and had appropriate pitch and quality.  No blanka peripheral digital clubbing or cyanosis   Ears -examined under microscopy bilaterally  The external auditory canals are patent, the tympanic membranes are dull bilaterally with effusion, no worrisome retraction or mass.  No erythema or bulging. Bony landmarks are intact.  Eyes - Extraocular movements intact, and the pupils were reactive to light.  Sclera were not icteric or injected, conjunctiva were pink and moist.  Mouth - Examination of the oral cavity showed pink, healthy oral mucosa. No lesions or ulcerations noted.  The tongue was mobile and midline, and the dentition were in good condition.    Throat - The walls of the oropharynx were smooth, pink, moist, symmetric, and had no lesions or ulcerations.  The tonsillar pillars and soft palate were symmetric.  The uvula was midline on elevation.    Neck - No palpable enlarged fixed cervical lymph nodes.  No neck cysts or unusual tenderness to palpation.   No palpable fixed thyroid nodules or concerning goiter.  The trachea is grossly midline.   Nose - External contour is symmetric, no gross deflection or scars.  Nasal mucosa is pink and moist with " no abnormal mucus.  The septum and turbinates were evaluated: non obstructive.  No polyps, masses, or purulence noted on examination.  Tuning fork exam reveals Pratt lateralizes to the right and Kirstie is negative in the right positive on the left    After informed consent was obtained and the nose was anesthetized with topical neosynepherine/lidocaine, the scope was advanced into the nares.  Maxillary antrostomy on the right is patent.   The middle turbinates are mildly edematous bilaterally.  there is no purulence and/ or excessive swelling.  Eustachian tubes are patent, no nasopharyngeal mass.    Audiogram reviewed which we were able to complete today and there is a bilateral moderate to profound mixed hearing loss she does have a significant conductive componentmplete today: Bilaterally SRT 65 dB in the right 60 dB in the left flat type B tympanograms bilaterally discrimination scores are fair    Bilateral Myringotomy with Tube Placement    Procedure - After discussion of the risks and benefits of myringotomy, informed consent was signed and placed in the chart.  I began with the right side.  I proceeded to position the patient in a supine position in the examination chair.  Using the binocular surgical microscope, I then proceeded to clean the canal of cerumen and squamous debris.  I was able to see the tympanic membrane.  I anesthetized the ear with 1% lidocaine with 1-100,000 of epinephrine.  After visualizing a good quoc, I then proceeded to use a myringotomy knife to make a radially oriented incision in the tympanic membrane.  A moderate amount of grade 3 glue effusion was suctioned away. I irrigated the ear with saline and gently suctioned the middle ear.  Next, I proceeded to place a 1.27 duravent tube through the incision.  After confirming good positioning and a clearly visible open tube, otic drops were applied and a cotton ball was inserted into the canal.      I now moved on to the contralateral  side. Using the binocular surgical microscope,  myringotomy with tube insertion was performed with similar findings and results.  On the left there is a larger grade 3 glue effusion.  Bilaterally the middle ear mucosa is edematous            Impression and Plan- Carmelita Watts is a 68 year old female with:    ICD-10-CM    1. Sudden hearing loss, left H91.22 AUDIOLOGY ADULT REFERRAL   2. Acute otitis media with effusion of both ears H65.193    3. ETD (Eustachian tube dysfunction), bilateral H69.83    4. Multiple myeloma not having achieved remission (H) C90.00    5. Status post chemotherapy Z92.21        A/P - The patient was given a prescription for ciprodex drops to use for 7 days.  The patient with then follow up in 4 weeks with repeat audiogram at that time.  The patient was instructed to call in or return sooner if there was any drainage from the ear.    This was all explained to Carlene.  She voices understanding    RTC in 1 month with audiogram and see Bridgette  RTC immediately for an audiogram with an sudden hearing loss in the future, particularly while on chemotherapy      Cha Ley D.O.  Otolaryngology/Head and Neck Surgery  Allergy

## 2019-09-20 NOTE — NURSING NOTE
"Chief Complaint   Patient presents with     Ear Problem     Pt has been referred by Gilbertolli for bilateral ear infections.  Bilateral ears are plugged.       Initial /70 (BP Location: Right arm, Cuff Size: Adult Regular)   Pulse 80   Temp 97.9  F (36.6  C) (Tympanic)   Ht 1.6 m (5' 3\")   Wt 65.3 kg (144 lb)   SpO2 99%   BMI 25.51 kg/m   Estimated body mass index is 25.51 kg/m  as calculated from the following:    Height as of this encounter: 1.6 m (5' 3\").    Weight as of this encounter: 65.3 kg (144 lb).  Medication Reconciliation: complete  Ruby Loomis LPN    "

## 2019-09-21 NOTE — PROGRESS NOTES
Visit Date:   09/20/2019      HISTORY OF PRESENT ILLNESS:  Ms. Watts returns for followup of IgA multiple myeloma.  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  She presented to the emergency room with vertigo and dizziness associated with fatigue.  CT head was negative.  She also had a stress test that was negative.  She was seen by VENKATESH Vázquez, who ordered chemistries on her, and she was found to have an elevated total protein of 12.9.  Further workup included a serum protein electrophoresis which revealed a monoclonal spike of 6.2 with a large monoclonal protein seen in the gamma fraction.  She also had urine for immunofixation, which revealed possible small protein band in the gamma fraction.  When we saw the patient, she did complain of chronic fatigue.  She was under a lot of stress related to her 's diagnosis of cancer.  She was occasionally dyspneic on exertion.  She also had vertigo, which was under control with meclizine.  When we saw the patient, given the fact that she had a monoclonal spike of 6.2, we wanted to rule out myeloma by obtaining bone marrow aspiration biopsy as well as skeletal metastatic bone survey and further lab work.  Further lab work included a CBC which revealed a white count 5.7, hemoglobin 9.3, hematocrit 27.7, platelet count 223,000.  Serum immunofixation revealed an M-spike of 7.3 with monoclonal IgM globulin of kappa light chain type.  Serum viscosity was elevated to 2.9.  Quantitative immunoglobulins revealed an elevated IgG of greater than 160.  BUN was 21, creatinine 0.8, total protein was 13.7.  Beta-2 microglobulin was 5.8.  Bone marrow aspiration biopsy revealed there was a plasma cell myeloma with approximately 80% plasma cells.  Immunofixation revealed IgG kappa.  Flow cytometry revealed kappa monotypic plasma cells consistent with clonal plasma cell neoplasm.  FISH  panel was consistent with standard risk cytogenetics.  There was no evidence of 17p deletion or 14:16 translocation or 4:14 translocation.  When we saw the patient on 06/24, we felt she had evidence of IgG kappa multiple myeloma with 80% involvement of the bone marrow, making the diagnosis.  She also had end-organ damage with anemia, elevated serum viscosity, questionable lytic lesion on bone survey, elevated B2 microglobulin elevated kappa lambda ratio and recommended this to be treated with Velcade, Revlimid and dexamethasone as an induction regimen.  Although she may be a transplant candidate, she was not interested in transplant at this point.  We would like to start her as soon as possible on chemotherapy with Velcade 1.3 mg/m2 given on days 1, 4, 8 and 11 with Decadron 40 mg on days 1, 8 and 15.  The plan was to add Revlimid and also receive the Zovirax prophylaxis.  Apparently, the patient cannot afford the copay for Revlimid, and therefore, we elected to stay with just the Velcade and dexamethasone regimen.  The patient did have an MRI of the lumbar spine, which revealed compression fracture of L3.  There were innumerable enhancing lesions throughout the spine, consistent with history of multiple myeloma, a larger enhancing focus involving the posterior elements of L3, which included the spinous process, left and the right lung and the right superior articular process.  The patient was also started on zoledronic acid 4 mg q.3 months, 4 cycles of Velcade and Decadron.  Her M spike did drop to 3.9.  Her IgG level dropped to 4820.  She was due for her fifth cycle of Velcade and Decadron on 09/17.  At that time, her M spike had risen to 4.3.  She was seen by Bonita Quintana with complaints of hearing loss and was diagnosed with otitis media on the left and started Augmentin.  She was seen by Dr. Ley today of ENT.  His plan is to place myringotomy tubes later today.  Given the fact her M spike had risen,  she is here now to discuss treatment plan changes.  As she could not afford Revlimid, we have recommended she go onto the CyBorD regimen, which is Cytoxan, bortezomib and Decadron, which is also effective in first-line setting for IgG kappa multiple myeloma.  She continues to have hearing loss, and as stated, plans to have myringotomy tubes placed later today.  Otherwise, she denies any fevers, night sweats, weight loss.      PHYSICAL EXAMINATION:   GENERAL:  She is an elderly white female in no acute distress.   VITAL SIGNS:  Blood pressure 110/60, pulse 74, respirations 18, temperature 98.1.   HEENT:  Atraumatic, normocephalic.  Oropharynx nonerythematous.   NECK:  Supple.   LUNGS:  Clear to auscultation and percussion.   HEART:  Regular rhythm, S1, S2 normal.   ABDOMEN:  Soft.  Normoactive bowel sounds.  No masses, nontender.   BACK:  No cervical, supraclavicular, axillary or inguinal nodes.   EXTREMITIES:  No edema.   NEUROLOGIC:  Nonfocal.      LABORATORY DATA:  Reveal CBC:  White count 6.7, H and H 10.7 and 26.3, platelet count 333.  BUN 12, creatinine 0.69.  M spike is 4.3.  IgA level was 5000.  Beta-2 microglobulin is 4.2, .  Serum viscosity is 2.1.  Kappa lambda ratio is 5.59.      IMPRESSION AND PLAN:  IgG kappa multiple myeloma.  ISS revised stage II with 80% involvement of the bone marrow, elevated beta-2 microglobulin, elevated kappa lambda ratio with standard risk cytogenetics deemed a candidate for Velcade, Revlimid and Decadron.  She could not afford the Revlimid co-pay and was started on Velcade and Decadron.  She refused  Stem cell transplant evaluation and consultation at the ..  She received 2 cycles with positive response with drop in M spike, but now with rising M spike, rising IgM kappa lambda ratio in the setting of a left otitis media, anemia.  Given the drop in hemoglobin as well as left otitis media, we would like to hold therapy until she has her myringotomy tubes placed.   Otherwise, we would like to switch to CyBorD regimen which is Cytoxan, Velcade and Decadron.  Velcade is given weekly at a dose of 1.3 mg/m2 subcutaneously weekly on day 1, 8, 15 and 22 out of a 28-day regimen with Cytoxan given weekly.  We would like to start a lower dose given her anemia, ongoing immunosuppressed state at a dose of 140 mg/m2 to be given weekly or 200 mg weekly on days 1, 8, 15 and 22.  Also, initiate dexamethasone 40 mg weekly on day 1, 8, 15 and 22.  We will also continue with Zovirax prophylaxis.  Discussed side effects of Cytoxan, including cytopenias.  The patient agreed and wants to proceed.  She will be prescribed antiemetics including Zofran and to continue antibiotics and complete antibiotic course as per Dr. Ley.  Once the antibiotic course is completed, we will start the regimen, likely in 1 week.      Forty minutes was spent with the patient.  We will monitor myeloma profile on a monthly basis.      Forty minutes was spent with the patient, over half in counseling and coordination of care.         DORIE GUNN MD             D: 2019   T: 2019   MT: MELIA      Name:     WILLIAM WILDER   MRN:      [id number]        Account:      [id number]   :      1950           Visit Date:   2019      Document: [id number]       cc: Kenyatta Ley DO

## 2019-09-23 ENCOUNTER — PATIENT OUTREACH (OUTPATIENT)
Dept: ONCOLOGY | Facility: OTHER | Age: 69
End: 2019-09-23

## 2019-09-23 DIAGNOSIS — C90.00 MULTIPLE MYELOMA NOT HAVING ACHIEVED REMISSION (H): ICD-10-CM

## 2019-09-23 NOTE — PROGRESS NOTES
Called [facility] and they had called pt on cytoxan.Per pharmacy pt told them to hold at this time.    Akilah Garcia RN   Oncology Care Coordinator

## 2019-09-24 NOTE — PROGRESS NOTES
Called  specialty pharmacy and spoke with Joaquin. Did discuss with pt that she has a new plan for treatment. She is aware and did discuss with pharmacy prior. She is aware that we will go over in more detail Monday. Plan to have possibly delivered by Friday. Will continue to follow.    Akilah Garcia RN   Oncology Care Coordinator

## 2019-09-25 ENCOUNTER — TELEPHONE (OUTPATIENT)
Dept: ONCOLOGY | Facility: OTHER | Age: 69
End: 2019-09-25

## 2019-09-25 DIAGNOSIS — C90.00 MULTIPLE MYELOMA NOT HAVING ACHIEVED REMISSION (H): Primary | ICD-10-CM

## 2019-09-25 RX ORDER — DEXAMETHASONE 4 MG/1
TABLET ORAL
Qty: 120 TABLET | Refills: 0 | Status: SHIPPED | OUTPATIENT
Start: 2019-09-25 | End: 2019-10-29

## 2019-09-25 RX ORDER — DEXAMETHASONE 4 MG/1
TABLET ORAL
Qty: 30 TABLET | Refills: 0 | OUTPATIENT
Start: 2019-09-25

## 2019-09-25 NOTE — TELEPHONE ENCOUNTER
Pended Dexamethasone according to new treatment plan. Please note that this order is for one more additional day in the cycle.Day 22.    Thank you.    Akilah Garcia RN   Oncology Care Coordinator

## 2019-09-25 NOTE — TELEPHONE ENCOUNTER
She will have Cytoxan delivered tomorrow.    Please note.  Akilah Garcia , RN   Oncology Care Coordinator

## 2019-09-25 NOTE — TELEPHONE ENCOUNTER
Ran through [employer] plan/Part B.Will be 21.68 and Cytoxan will be delivered tomorrow per Kristine at [facility] pharmacy.    Akilah Garcia RN   Oncology Care Coordinator

## 2019-09-25 NOTE — TELEPHONE ENCOUNTER
Part D Medicare Denial received.Called [facility] to clarify that Cytoxan will be delivered and was told it would be approx 28 dollars a month and pt is OK with this.Covered under Part B per Kristine at Pharmacy. While speaking with her she needs to review this further.She will call back.    Akilah Garcia , RN   Oncology Care Coordinator

## 2019-09-26 NOTE — TELEPHONE ENCOUNTER
Called pt and updated to  new RX for Dex before upcoming appointment. She verbalized understanding.    Akilah Garcia RN   Oncology Care Coordinator

## 2019-09-29 ENCOUNTER — DOCUMENTATION ONLY (OUTPATIENT)
Dept: ONCOLOGY | Facility: OTHER | Age: 69
End: 2019-09-29

## 2019-09-29 NOTE — PROGRESS NOTES
[facility]    Pharmacy IP Consult    The inpatient pharmacy was consulted to review the patient's chart for any significant drug interactions between home medications, new take home medications, pre-treats, PRN medications, and chemotherapy agents.     Current Outpatient Medications   Medication Sig Dispense Refill     acyclovir (ZOVIRAX) 400 MG tablet Take 1 tablet (400 mg) by mouth 2 times daily Viral Prophylaxis. 60 tablet 5     amoxicillin-clavulanate (AUGMENTIN) 875-125 MG tablet Take 1 tablet by mouth 2 times daily 6 tablet 0     atorvastatin (LIPITOR) 10 MG tablet Take 1 tablet (10 mg) by mouth daily 90 tablet 3     citalopram (CELEXA) 20 MG tablet Take 1 tablet (20 mg) by mouth daily 90 tablet 3     cyclophosphamide (CYTOXAN) 50 MG capsule Take 4 capsules (200mg) by mouth every 7 days for 4 doses. Daily on Day 1,8,15 and 22. 16 capsule 3     dexamethasone (DECADRON) 4 MG tablet Take 40mg (10 tablets) daily on Days 1, 8 , 15 and 22. 120 tablet 0     dexamethasone (DECADRON) 4 MG tablet Take 10 tablets (40 mg) by mouth once a week On days 1, 8, 15. 30 tablet 3     EPINEPHrine (EPIPEN) 0.3 MG/0.3ML injection Inject 0.3 mg into the muscle once as needed. Use as needed for anaphylaxis.       fluticasone (FLONASE) 50 MCG/ACT nasal spray SPRAY 2 SPRAYS INTO BOTH NOSTRILS DAILY 48 mL 0     LORazepam (ATIVAN) 0.5 MG tablet Take 1 tablet (0.5 mg) by mouth every 4 hours as needed (Anxiety, Nausea/Vomiting or Sleep) 30 tablet 3     losartan (COZAAR) 50 MG tablet Take 1 tablet (50 mg) by mouth daily 90 tablet 3     prochlorperazine (COMPAZINE) 10 MG tablet Take 1 tablet (10 mg) by mouth every 6 hours as needed (Nausea/Vomiting) 30 tablet 3     Take home medications:       Pre-Treats:       PRN medications:       Chemotherapy agents:       The following interactions were found and will require patient education:   Drug-Drug interactions:   1. Citalopram and ondansetron: increased risk of QT  "interval prolongation.   2. Citalopram and prochlorperazine: increased risk of QT interval prolongation.   3. Ondansetron and prochlorperazine: increased risk of QT interval prolongation  Recommendations: EKG records from 6/11/19 indicated an \"abnormal ECG\" with \"1st degree AV block\" and a QTc of 486. Patient is at increased risk for QT interval prolongation from medications, older age, female sex, lower potassium. Consider switching citalopram to alternative agent (see below).    Reviewed article, Evaluating the risk of QTc prolongation associated with antidepressant use in older adults: a review of the evidence which suggests that \"Available data indicate that TCAs and citalopram pose the greatest risk for QT prolongation in older adults whereas the other SSRIs and SNRIs do not appear to pose any significant risk on their own. Data are lacking in other antidepressants, including bupropion, vilazodone, and levomilnacipran; however, it does not appear that bupropion prolongs the QT interval, based on case reports in nonelderly patients and overdoses.\"    Drug-Allergy interactions:   n/a    Drug-Food interactions:   1. Fluticasone and grapefruit juice: increased fluticasone exposure  2. Atorvastatin and grapefruit juice: increased atorvastatin exposure  3. Losartan and grapefruit juice: increased half-life of losartan's active metabolite  Recommendation: Avoid grapefruit/grapefruit juice.     Drug-Ethanol interactions:   1. Citalopram: may result in potentiation of the cognitive and motor effects of alcohol  2. Lorazepam: increased sedation  3. Prochlorperazine: increased CNS depression and risk of extrapyramidal reactions     If there are any additional questions or concerns, please contact the inpatient pharmacy ([phone]) for further review.     Allie Ccaeres, JOSE MIGUEL  September 29, 2019    "

## 2019-09-30 ENCOUNTER — ONCOLOGY VISIT (OUTPATIENT)
Dept: ONCOLOGY | Facility: OTHER | Age: 69
End: 2019-09-30
Attending: INTERNAL MEDICINE
Payer: MEDICARE

## 2019-09-30 ENCOUNTER — PATIENT OUTREACH (OUTPATIENT)
Dept: ONCOLOGY | Facility: OTHER | Age: 69
End: 2019-09-30

## 2019-09-30 VITALS
WEIGHT: 150.6 LBS | HEIGHT: 63 IN | SYSTOLIC BLOOD PRESSURE: 120 MMHG | DIASTOLIC BLOOD PRESSURE: 78 MMHG | OXYGEN SATURATION: 95 % | TEMPERATURE: 98 F | HEART RATE: 84 BPM | BODY MASS INDEX: 26.68 KG/M2 | RESPIRATION RATE: 18 BRPM

## 2019-09-30 DIAGNOSIS — C90.00 MULTIPLE MYELOMA NOT HAVING ACHIEVED REMISSION (H): ICD-10-CM

## 2019-09-30 DIAGNOSIS — C90.00 MULTIPLE MYELOMA NOT HAVING ACHIEVED REMISSION (H): Primary | ICD-10-CM

## 2019-09-30 LAB
ALBUMIN SERPL-MCNC: 3 G/DL (ref 3.4–5)
ALP SERPL-CCNC: 86 U/L (ref 40–150)
ALT SERPL W P-5'-P-CCNC: 15 U/L (ref 0–50)
ANION GAP SERPL CALCULATED.3IONS-SCNC: 5 MMOL/L (ref 3–14)
AST SERPL W P-5'-P-CCNC: 13 U/L (ref 0–45)
BASOPHILS # BLD AUTO: 0 10E9/L (ref 0–0.2)
BASOPHILS NFR BLD AUTO: 0.3 %
BILIRUB SERPL-MCNC: 0.2 MG/DL (ref 0.2–1.3)
BUN SERPL-MCNC: 13 MG/DL (ref 7–30)
CALCIUM SERPL-MCNC: 8.8 MG/DL (ref 8.5–10.1)
CHLORIDE SERPL-SCNC: 104 MMOL/L (ref 94–109)
CO2 SERPL-SCNC: 27 MMOL/L (ref 20–32)
CREAT SERPL-MCNC: 0.79 MG/DL (ref 0.52–1.04)
DIFFERENTIAL METHOD BLD: ABNORMAL
EOSINOPHIL # BLD AUTO: 0.1 10E9/L (ref 0–0.7)
EOSINOPHIL NFR BLD AUTO: 1.7 %
ERYTHROCYTE [DISTWIDTH] IN BLOOD BY AUTOMATED COUNT: 15.8 % (ref 10–15)
GFR SERPL CREATININE-BSD FRML MDRD: 75 ML/MIN/{1.73_M2}
GLUCOSE SERPL-MCNC: 102 MG/DL (ref 70–99)
HCT VFR BLD AUTO: 27.3 % (ref 35–47)
HGB BLD-MCNC: 9 G/DL (ref 11.7–15.7)
IMM GRANULOCYTES # BLD: 0.1 10E9/L (ref 0–0.4)
IMM GRANULOCYTES NFR BLD: 1 %
LYMPHOCYTES # BLD AUTO: 1.9 10E9/L (ref 0.8–5.3)
LYMPHOCYTES NFR BLD AUTO: 32.3 %
MCH RBC QN AUTO: 31.1 PG (ref 26.5–33)
MCHC RBC AUTO-ENTMCNC: 33 G/DL (ref 31.5–36.5)
MCV RBC AUTO: 95 FL (ref 78–100)
MONOCYTES # BLD AUTO: 0.9 10E9/L (ref 0–1.3)
MONOCYTES NFR BLD AUTO: 15.2 %
NEUTROPHILS # BLD AUTO: 3 10E9/L (ref 1.6–8.3)
NEUTROPHILS NFR BLD AUTO: 49.5 %
NRBC # BLD AUTO: 0 10*3/UL
NRBC BLD AUTO-RTO: 0 /100
PLATELET # BLD AUTO: 322 10E9/L (ref 150–450)
POTASSIUM SERPL-SCNC: 3.6 MMOL/L (ref 3.4–5.3)
PROT SERPL-MCNC: 10.6 G/DL (ref 6.8–8.8)
RBC # BLD AUTO: 2.89 10E12/L (ref 3.8–5.2)
SODIUM SERPL-SCNC: 136 MMOL/L (ref 133–144)
WBC # BLD AUTO: 6 10E9/L (ref 4–11)

## 2019-09-30 PROCEDURE — 36415 COLL VENOUS BLD VENIPUNCTURE: CPT | Mod: ZL | Performed by: NURSE PRACTITIONER

## 2019-09-30 PROCEDURE — G0463 HOSPITAL OUTPT CLINIC VISIT: HCPCS

## 2019-09-30 PROCEDURE — 99213 OFFICE O/P EST LOW 20 MIN: CPT | Performed by: INTERNAL MEDICINE

## 2019-09-30 PROCEDURE — 85025 COMPLETE CBC W/AUTO DIFF WBC: CPT | Mod: ZL | Performed by: NURSE PRACTITIONER

## 2019-09-30 PROCEDURE — 80053 COMPREHEN METABOLIC PANEL: CPT | Mod: ZL | Performed by: NURSE PRACTITIONER

## 2019-09-30 RX ORDER — EPINEPHRINE 1 MG/ML
0.3 INJECTION, SOLUTION, CONCENTRATE INTRAVENOUS EVERY 5 MIN PRN
Status: CANCELLED | OUTPATIENT
Start: 2019-10-22

## 2019-09-30 RX ORDER — NALOXONE HYDROCHLORIDE 0.4 MG/ML
.1-.4 INJECTION, SOLUTION INTRAMUSCULAR; INTRAVENOUS; SUBCUTANEOUS
Status: CANCELLED | OUTPATIENT
Start: 2019-10-01

## 2019-09-30 RX ORDER — ACYCLOVIR 400 MG/1
400 TABLET ORAL 2 TIMES DAILY
Qty: 60 TABLET | Refills: 3 | Status: SHIPPED | OUTPATIENT
Start: 2019-09-30 | End: 2019-10-29

## 2019-09-30 RX ORDER — SODIUM CHLORIDE 9 MG/ML
1000 INJECTION, SOLUTION INTRAVENOUS CONTINUOUS PRN
Status: CANCELLED
Start: 2019-10-15

## 2019-09-30 RX ORDER — DIPHENHYDRAMINE HYDROCHLORIDE 50 MG/ML
50 INJECTION INTRAMUSCULAR; INTRAVENOUS
Status: CANCELLED
Start: 2019-10-15

## 2019-09-30 RX ORDER — EPINEPHRINE 0.3 MG/.3ML
0.3 INJECTION SUBCUTANEOUS EVERY 5 MIN PRN
Status: CANCELLED | OUTPATIENT
Start: 2019-10-08

## 2019-09-30 RX ORDER — ONDANSETRON 8 MG/1
8 TABLET, FILM COATED ORAL EVERY 8 HOURS PRN
Qty: 10 TABLET | Refills: 2 | Status: SHIPPED | OUTPATIENT
Start: 2019-09-30 | End: 2020-11-24

## 2019-09-30 RX ORDER — DIPHENHYDRAMINE HYDROCHLORIDE 50 MG/ML
50 INJECTION INTRAMUSCULAR; INTRAVENOUS
Status: CANCELLED
Start: 2019-10-08

## 2019-09-30 RX ORDER — ALBUTEROL SULFATE 90 UG/1
1-2 AEROSOL, METERED RESPIRATORY (INHALATION)
Status: CANCELLED
Start: 2019-10-22

## 2019-09-30 RX ORDER — ALBUTEROL SULFATE 0.83 MG/ML
2.5 SOLUTION RESPIRATORY (INHALATION)
Status: CANCELLED | OUTPATIENT
Start: 2019-10-22

## 2019-09-30 RX ORDER — EPINEPHRINE 1 MG/ML
0.3 INJECTION, SOLUTION, CONCENTRATE INTRAVENOUS EVERY 5 MIN PRN
Status: CANCELLED | OUTPATIENT
Start: 2019-10-01

## 2019-09-30 RX ORDER — EPINEPHRINE 1 MG/ML
0.3 INJECTION, SOLUTION, CONCENTRATE INTRAVENOUS EVERY 5 MIN PRN
Status: CANCELLED | OUTPATIENT
Start: 2019-10-08

## 2019-09-30 RX ORDER — EPINEPHRINE 1 MG/ML
0.3 INJECTION, SOLUTION, CONCENTRATE INTRAVENOUS EVERY 5 MIN PRN
Status: CANCELLED | OUTPATIENT
Start: 2019-10-15

## 2019-09-30 RX ORDER — MEPERIDINE HYDROCHLORIDE 25 MG/ML
25 INJECTION INTRAMUSCULAR; INTRAVENOUS; SUBCUTANEOUS EVERY 30 MIN PRN
Status: CANCELLED | OUTPATIENT
Start: 2019-10-08

## 2019-09-30 RX ORDER — SODIUM CHLORIDE 9 MG/ML
1000 INJECTION, SOLUTION INTRAVENOUS CONTINUOUS PRN
Status: CANCELLED
Start: 2019-10-01

## 2019-09-30 RX ORDER — MEPERIDINE HYDROCHLORIDE 25 MG/ML
25 INJECTION INTRAMUSCULAR; INTRAVENOUS; SUBCUTANEOUS EVERY 30 MIN PRN
Status: CANCELLED | OUTPATIENT
Start: 2019-10-22

## 2019-09-30 RX ORDER — LORAZEPAM 2 MG/ML
0.5 INJECTION INTRAMUSCULAR EVERY 4 HOURS PRN
Status: CANCELLED
Start: 2019-10-08

## 2019-09-30 RX ORDER — LORAZEPAM 0.5 MG/1
0.5 TABLET ORAL EVERY 4 HOURS PRN
Qty: 30 TABLET | Refills: 2 | Status: SHIPPED | OUTPATIENT
Start: 2019-09-30 | End: 2020-11-24

## 2019-09-30 RX ORDER — SODIUM CHLORIDE 9 MG/ML
1000 INJECTION, SOLUTION INTRAVENOUS CONTINUOUS PRN
Status: CANCELLED
Start: 2019-10-22

## 2019-09-30 RX ORDER — ALBUTEROL SULFATE 90 UG/1
1-2 AEROSOL, METERED RESPIRATORY (INHALATION)
Status: CANCELLED
Start: 2019-10-01

## 2019-09-30 RX ORDER — SODIUM CHLORIDE 9 MG/ML
1000 INJECTION, SOLUTION INTRAVENOUS CONTINUOUS PRN
Status: CANCELLED
Start: 2019-10-08

## 2019-09-30 RX ORDER — EPINEPHRINE 0.3 MG/.3ML
0.3 INJECTION SUBCUTANEOUS EVERY 5 MIN PRN
Status: CANCELLED | OUTPATIENT
Start: 2019-10-01

## 2019-09-30 RX ORDER — ALBUTEROL SULFATE 0.83 MG/ML
2.5 SOLUTION RESPIRATORY (INHALATION)
Status: CANCELLED | OUTPATIENT
Start: 2019-10-15

## 2019-09-30 RX ORDER — ALBUTEROL SULFATE 0.83 MG/ML
2.5 SOLUTION RESPIRATORY (INHALATION)
Status: CANCELLED | OUTPATIENT
Start: 2019-10-01

## 2019-09-30 RX ORDER — NALOXONE HYDROCHLORIDE 0.4 MG/ML
.1-.4 INJECTION, SOLUTION INTRAMUSCULAR; INTRAVENOUS; SUBCUTANEOUS
Status: CANCELLED | OUTPATIENT
Start: 2019-10-08

## 2019-09-30 RX ORDER — EPINEPHRINE 0.3 MG/.3ML
0.3 INJECTION SUBCUTANEOUS EVERY 5 MIN PRN
Status: CANCELLED | OUTPATIENT
Start: 2019-10-22

## 2019-09-30 RX ORDER — DIPHENHYDRAMINE HYDROCHLORIDE 50 MG/ML
50 INJECTION INTRAMUSCULAR; INTRAVENOUS
Status: CANCELLED
Start: 2019-10-01

## 2019-09-30 RX ORDER — ALBUTEROL SULFATE 90 UG/1
1-2 AEROSOL, METERED RESPIRATORY (INHALATION)
Status: CANCELLED
Start: 2019-10-08

## 2019-09-30 RX ORDER — METHYLPREDNISOLONE SODIUM SUCCINATE 125 MG/2ML
125 INJECTION, POWDER, LYOPHILIZED, FOR SOLUTION INTRAMUSCULAR; INTRAVENOUS
Status: CANCELLED
Start: 2019-10-01

## 2019-09-30 RX ORDER — NALOXONE HYDROCHLORIDE 0.4 MG/ML
.1-.4 INJECTION, SOLUTION INTRAMUSCULAR; INTRAVENOUS; SUBCUTANEOUS
Status: CANCELLED | OUTPATIENT
Start: 2019-10-15

## 2019-09-30 RX ORDER — EPINEPHRINE 0.3 MG/.3ML
0.3 INJECTION SUBCUTANEOUS EVERY 5 MIN PRN
Status: CANCELLED | OUTPATIENT
Start: 2019-10-15

## 2019-09-30 RX ORDER — METHYLPREDNISOLONE SODIUM SUCCINATE 125 MG/2ML
125 INJECTION, POWDER, LYOPHILIZED, FOR SOLUTION INTRAMUSCULAR; INTRAVENOUS
Status: CANCELLED
Start: 2019-10-08

## 2019-09-30 RX ORDER — NALOXONE HYDROCHLORIDE 0.4 MG/ML
.1-.4 INJECTION, SOLUTION INTRAMUSCULAR; INTRAVENOUS; SUBCUTANEOUS
Status: CANCELLED | OUTPATIENT
Start: 2019-10-22

## 2019-09-30 RX ORDER — MEPERIDINE HYDROCHLORIDE 25 MG/ML
25 INJECTION INTRAMUSCULAR; INTRAVENOUS; SUBCUTANEOUS EVERY 30 MIN PRN
Status: CANCELLED | OUTPATIENT
Start: 2019-10-01

## 2019-09-30 RX ORDER — DIPHENHYDRAMINE HYDROCHLORIDE 50 MG/ML
50 INJECTION INTRAMUSCULAR; INTRAVENOUS
Status: CANCELLED
Start: 2019-10-22

## 2019-09-30 RX ORDER — ALBUTEROL SULFATE 0.83 MG/ML
2.5 SOLUTION RESPIRATORY (INHALATION)
Status: CANCELLED | OUTPATIENT
Start: 2019-10-08

## 2019-09-30 RX ORDER — PROCHLORPERAZINE MALEATE 10 MG
10 TABLET ORAL EVERY 6 HOURS PRN
Qty: 30 TABLET | Refills: 2 | Status: SHIPPED | OUTPATIENT
Start: 2019-09-30 | End: 2019-10-29

## 2019-09-30 RX ORDER — ALBUTEROL SULFATE 90 UG/1
1-2 AEROSOL, METERED RESPIRATORY (INHALATION)
Status: CANCELLED
Start: 2019-10-15

## 2019-09-30 RX ORDER — METHYLPREDNISOLONE SODIUM SUCCINATE 125 MG/2ML
125 INJECTION, POWDER, LYOPHILIZED, FOR SOLUTION INTRAMUSCULAR; INTRAVENOUS
Status: CANCELLED
Start: 2019-10-22

## 2019-09-30 RX ORDER — METHYLPREDNISOLONE SODIUM SUCCINATE 125 MG/2ML
125 INJECTION, POWDER, LYOPHILIZED, FOR SOLUTION INTRAMUSCULAR; INTRAVENOUS
Status: CANCELLED
Start: 2019-10-15

## 2019-09-30 RX ORDER — LORAZEPAM 2 MG/ML
0.5 INJECTION INTRAMUSCULAR EVERY 4 HOURS PRN
Status: CANCELLED
Start: 2019-10-01

## 2019-09-30 RX ORDER — MEPERIDINE HYDROCHLORIDE 25 MG/ML
25 INJECTION INTRAMUSCULAR; INTRAVENOUS; SUBCUTANEOUS EVERY 30 MIN PRN
Status: CANCELLED | OUTPATIENT
Start: 2019-10-15

## 2019-09-30 RX ORDER — LORAZEPAM 2 MG/ML
0.5 INJECTION INTRAMUSCULAR EVERY 4 HOURS PRN
Status: CANCELLED
Start: 2019-10-22

## 2019-09-30 RX ORDER — LORAZEPAM 2 MG/ML
0.5 INJECTION INTRAMUSCULAR EVERY 4 HOURS PRN
Status: CANCELLED
Start: 2019-10-15

## 2019-09-30 ASSESSMENT — MIFFLIN-ST. JEOR: SCORE: 1167.25

## 2019-09-30 ASSESSMENT — PATIENT HEALTH QUESTIONNAIRE - PHQ9: SUM OF ALL RESPONSES TO PHQ QUESTIONS 1-9: 3

## 2019-09-30 ASSESSMENT — PAIN SCALES - GENERAL: PAINLEVEL: MILD PAIN (3)

## 2019-09-30 NOTE — PATIENT INSTRUCTIONS
New treatment with bortexomib, cyclophosphamide, and dexamethasone.  Follow up in one month with big labs one week prior.      Please call with any questions at [phone].

## 2019-09-30 NOTE — PROGRESS NOTES
Clinic Care Coordination Contact  Care Team Conversations    Signed consent for chemotherapy.Patient comes tomorrow.Pt advised not to take nausea medication until seen by PCP on Thursday d/t interactions. She verbalized understanding. Please note.      Akilah Garcia RN   Oncology Care Coordinator

## 2019-09-30 NOTE — NURSING NOTE
"Chief Complaint   Patient presents with     RECHECK     Follow up Multiple myeloma not having achieved remission        Initial /78   Pulse 84   Temp 98  F (36.7  C) (Tympanic)   Resp 18   Ht 1.6 m (5' 3\")   Wt 68.3 kg (150 lb 9.6 oz)   SpO2 95%   BMI 26.68 kg/m   Estimated body mass index is 26.68 kg/m  as calculated from the following:    Height as of this encounter: 1.6 m (5' 3\").    Weight as of this encounter: 68.3 kg (150 lb 9.6 oz).  Medication Reconciliation: complete.  Immunizations and advance directives status reviewed. Pain scale 3 low back , PHQ-9 =0.            Vilma Chi LPN    "

## 2019-09-30 NOTE — PROGRESS NOTES
I would like ot see this person in office before changing. Taking away her antidepressant in such a stressful time not recommended.

## 2019-10-01 ENCOUNTER — TELEPHONE (OUTPATIENT)
Dept: INFUSION THERAPY | Facility: OTHER | Age: 69
End: 2019-10-01

## 2019-10-01 ENCOUNTER — INFUSION THERAPY VISIT (OUTPATIENT)
Dept: INFUSION THERAPY | Facility: OTHER | Age: 69
End: 2019-10-01
Attending: INTERNAL MEDICINE
Payer: MEDICARE

## 2019-10-01 VITALS
OXYGEN SATURATION: 95 % | HEART RATE: 89 BPM | WEIGHT: 151.9 LBS | BODY MASS INDEX: 26.91 KG/M2 | TEMPERATURE: 98 F | SYSTOLIC BLOOD PRESSURE: 127 MMHG | HEIGHT: 63 IN | DIASTOLIC BLOOD PRESSURE: 88 MMHG

## 2019-10-01 DIAGNOSIS — C90.00 MULTIPLE MYELOMA NOT HAVING ACHIEVED REMISSION (H): Primary | ICD-10-CM

## 2019-10-01 PROCEDURE — 25000128 H RX IP 250 OP 636: Performed by: INTERNAL MEDICINE

## 2019-10-01 PROCEDURE — 25000131 ZZH RX MED GY IP 250 OP 636 PS 637: Performed by: INTERNAL MEDICINE

## 2019-10-01 PROCEDURE — 96401 CHEMO ANTI-NEOPL SQ/IM: CPT

## 2019-10-01 RX ORDER — ONDANSETRON 4 MG/1
8 TABLET, FILM COATED ORAL ONCE
Status: COMPLETED | OUTPATIENT
Start: 2019-10-01 | End: 2019-10-01

## 2019-10-01 RX ADMIN — ONDANSETRON HYDROCHLORIDE 8 MG: 4 TABLET, FILM COATED ORAL at 09:50

## 2019-10-01 RX ADMIN — BORTEZOMIB 2.6 MG: 3.5 INJECTION, POWDER, LYOPHILIZED, FOR SOLUTION INTRAVENOUS; SUBCUTANEOUS at 10:47

## 2019-10-01 ASSESSMENT — MIFFLIN-ST. JEOR: SCORE: 1173.01

## 2019-10-01 NOTE — PROGRESS NOTES
Visit Date:   09/30/2019      HISTORY OF PRESENT ILLNESS:  Mr. Watts returns for followup of IgA multiple myeloma.  She was just here on 09/20.  For details, see previous note.  She is here to initiate the CyBorD regimen, as she has progressed on Velcade and Decadron.  The patient apparently had been seen by Dr. Ley who felt the patient required myringotomy.  A myringotomy tube was placed and she completed a course of antibiotics.  She says she can hear well today.  She has no further hearing loss.  Also on Celexa, which apparently is contraindicated with Zofran and Compazine.  She will see Kenyatta Trujillo for changing her antidepressant.  Nonetheless, she can start tomorrow.  We have told her to not take any Zofran or Compazine until she sees Kenyatta Trujillo.  Cytoxan has mild antiemetic properties.  She has received oral doses and is here to start CyBorD.  She offers no complaints of shortness of breath, chest pain, abdominal pain, fevers, night sweats, or weight loss.  Her hearing is improved.  She is overall doing much better.      PHYSICAL EXAMINATION:   GENERAL:  She is an elderly white female in no acute distress.   VITAL SIGNS:  Blood pressure 120/78, pulse 84, respirations 18, temperature 98.   HEENT:  Atraumatic, normocephalic.  Oropharynx nonerythematous.   NECK:  Supple.   LUNGS:  Clear to auscultation and percussion.   HEART:  Regular rhythm, S1, S2 normal.   ABDOMEN:  Soft, normoactive bowel sounds.  No mass, nontender.   LYMPHATICS:  No cervical, supraclavicular, axillary nodes or inguinal nodes.   EXTREMITIES:  No edema.   NEUROLOGIC:  Nonfocal.      LABORATORY DATA:  Laboratories reveal CBC:  White count 6.0, H and H 9.0 and 27.3, platelet count 322.  BUN 13, creatinine 0.79.  LFTs are normal.      IMPRESSION:  IgG kappa multiple myeloma.  ISS revised stage II with 80% involvement of the bone marrow elevated beta-2 microglobulin, elevated kappa lambda ratio with standard risk cytogenetics  deemed a candidate for Velcade, Revlimid and Decadron.  She could not afford Revlimid copay was started on Velcade and Decadron.  She refused stem cell transplant evaluation and consultation at the .  She received 2 cycles with positive response with drop in M spike, but now with rising M spike rising IgM kappa lambda ratio in a setting of a left otitis media, anemia.  Given the drop in hemoglobin as well as left otitis media, therapy was held and she had myringotomy tubes placed.  She completed a course of antibiotics.  Otherwise, she is here to initiate CyBorD which is Cytoxan, Velcade and Decadron.  Velcade given at a dose of 1.5 mg/m2 subcutaneously weekly on days 1, 8, 15 and 22 of a 28-day regimen with Cytoxan given at a dose of 140 mg/m2 weekly or 200 mg weekly on days 1, 8, 15 and 22.  She is receiving a reduced dose of Cytoxan based on her immunosuppressed state.  Also, she will continue with Zovirax prophylaxis and proceed with dexamethasone 40 mg weekly on days 1, 8, 15 and 22.  Otherwise, we will monitor myeloma labs on day 22 and see the patient prior to cycle 2, day 1.  In terms of her Celexa, she was to follow up with Kenyatta Trujillo to prescribe an alternative antidepressant.  Otherwise, she will hold antiemetics and she sees Kenyatta Trujillo.     Forty minutes was spent with the patient, over half in counseling and coordination of care.         DORIE GUNN MD             D: 2019   T: 10/01/2019   MT: ABILIO      Name:     WILLIAM WILDER   MRN:      [id number]        Account:      [id number]   :      1950           Visit Date:   2019      Document: [id number]       cc: Kenyatta Ley DO

## 2019-10-01 NOTE — PROGRESS NOTES
Patient is a 69 year old female here today for injection of Velcade per order of . Patient meets parameters for today's infusion. Patient identified with two identifiers, order verified, and verbal consent for today's infusion obtained from patient. Written consent for treatment is on file and valid.    Recent lab values: WBC: 6.0, HGB: 9.0, PLT: 322  ANC: 3.0. Patient meets order parameters for today's treatment.  Patient denies questions or concerns regarding injection and/or medication(s) being administered.  Velcade injected SQ into right upper outer arm per protocol rotating sites.     Injection administered per protocol. Patient tolerated infusion without incident, no signs or symptoms of adverse reaction noted. Patient denies pain or discomfort.     Covered with a sterile bandage. Pt instructed to leave bandage intact for a minimum of one hour, and to call with questions or concerns. Copy of appointments, discharge instructions, and after visit summary (AVS) provided to patient. Patient states understanding, discharged ambulatory.

## 2019-10-01 NOTE — TELEPHONE ENCOUNTER
----- Message from Elías Walker MD sent at 9/30/2019  7:20 PM CDT -----  Regarding: RE: velcade and cytoxan  Barron, The correct dose should be 1.5 mg/m2 ThanksDr. CORREA  ----- Message -----  From: Irene Booth RN  Sent: 9/30/2019   3:26 PM CDT  To: Elías Walker MD  Subject: velcade and cytoxan                              Dr Walker there is a discrepancy between you note and the orders placed for [facility]   Velcade in your note 1.3mg/m2 and the orders are 1.5mg/m2  Cytoxan in your note 140mg/m2 weekly or 200mg weekly on days 1,8,15,22 orders are 120mg/m2  Please advise

## 2019-10-03 ENCOUNTER — OFFICE VISIT (OUTPATIENT)
Dept: FAMILY MEDICINE | Facility: OTHER | Age: 69
End: 2019-10-03
Attending: PHYSICIAN ASSISTANT
Payer: COMMERCIAL

## 2019-10-03 VITALS
DIASTOLIC BLOOD PRESSURE: 86 MMHG | WEIGHT: 151 LBS | RESPIRATION RATE: 20 BRPM | HEART RATE: 85 BPM | TEMPERATURE: 100 F | HEIGHT: 63 IN | OXYGEN SATURATION: 98 % | SYSTOLIC BLOOD PRESSURE: 136 MMHG | BODY MASS INDEX: 26.75 KG/M2

## 2019-10-03 DIAGNOSIS — C90.00 MULTIPLE MYELOMA NOT HAVING ACHIEVED REMISSION (H): ICD-10-CM

## 2019-10-03 DIAGNOSIS — H90.6 MIXED CONDUCTIVE AND SENSORINEURAL HEARING LOSS OF BOTH EARS: Primary | ICD-10-CM

## 2019-10-03 DIAGNOSIS — F41.1 GAD (GENERALIZED ANXIETY DISORDER): ICD-10-CM

## 2019-10-03 PROCEDURE — 99214 OFFICE O/P EST MOD 30 MIN: CPT | Performed by: PHYSICIAN ASSISTANT

## 2019-10-03 PROCEDURE — G0463 HOSPITAL OUTPT CLINIC VISIT: HCPCS

## 2019-10-03 ASSESSMENT — PAIN SCALES - GENERAL: PAINLEVEL: NO PAIN (1)

## 2019-10-03 ASSESSMENT — PATIENT HEALTH QUESTIONNAIRE - PHQ9: SUM OF ALL RESPONSES TO PHQ QUESTIONS 1-9: 3

## 2019-10-03 ASSESSMENT — ANXIETY QUESTIONNAIRES
3. WORRYING TOO MUCH ABOUT DIFFERENT THINGS: NOT AT ALL
7. FEELING AFRAID AS IF SOMETHING AWFUL MIGHT HAPPEN: NOT AT ALL
1. FEELING NERVOUS, ANXIOUS, OR ON EDGE: SEVERAL DAYS
6. BECOMING EASILY ANNOYED OR IRRITABLE: SEVERAL DAYS
IF YOU CHECKED OFF ANY PROBLEMS ON THIS QUESTIONNAIRE, HOW DIFFICULT HAVE THESE PROBLEMS MADE IT FOR YOU TO DO YOUR WORK, TAKE CARE OF THINGS AT HOME, OR GET ALONG WITH OTHER PEOPLE: SOMEWHAT DIFFICULT
GAD7 TOTAL SCORE: 2
4. TROUBLE RELAXING: NOT AT ALL
5. BEING SO RESTLESS THAT IT IS HARD TO SIT STILL: NOT AT ALL
2. NOT BEING ABLE TO STOP OR CONTROL WORRYING: NOT AT ALL

## 2019-10-03 ASSESSMENT — MIFFLIN-ST. JEOR: SCORE: 1169.06

## 2019-10-03 NOTE — NURSING NOTE
"Chief Complaint   Patient presents with     Medication Refill       Initial /86 (BP Location: Right arm, Patient Position: Sitting, Cuff Size: Adult Regular)   Pulse 85   Temp 100  F (37.8  C) (Tympanic)   Resp 20   Ht 1.6 m (5' 3\")   Wt 68.5 kg (151 lb)   SpO2 98%   BMI 26.75 kg/m   Estimated body mass index is 26.75 kg/m  as calculated from the following:    Height as of this encounter: 1.6 m (5' 3\").    Weight as of this encounter: 68.5 kg (151 lb).  Medication Reconciliation: complete  Chloé Grey LPN  "

## 2019-10-03 NOTE — PROGRESS NOTES
"Subjective     Carmelita Watts is a 69 year old female who presents to clinic today for the following health issues:    HPI   Medication Followup of  Med management     Taking Medication as prescribed: yes    Side Effects:  None    Medication Helping Symptoms:  Yes wish to discuss discontinuing a few mediactions     Ears are plugged       Duration: Sept 12th    Description (location/character/radiation): feels like hearing is diminished     Intensity:  mild, moderate    Accompanying signs and symptoms: plugged feeling     History (similar episodes/previous evaluation): None    Precipitating or alleviating factors: None    Therapies tried and outcome: None       Patient Active Problem List   Diagnosis     Hyperlipidemia LDL goal <130     Hypertension goal BP (blood pressure) < 140/80     Advanced care planning/counseling discussion     Major depressive disorder, recurrent episode, mild (H)     Seasonal allergies     Mixed hyperlipidemia     ACP (advance care planning)     Multiple myeloma not having achieved remission (H)     Past Surgical History:   Procedure Laterality Date     APPENDECTOMY       BONE MARROW BIOPSY, BONE SPECIMEN, NEEDLE/TROCAR N/A 6/18/2019    Procedure: BONE MARROW BIOPSY;  Surgeon: Maciej Sanz MD;  Location: [facility]     CHOLECYSTECTOMY       COLONOSCOPY  07-    repeat 10 years     COLONOSCOPY N/A 12/30/2016    Procedure: COLONOSCOPY;  Surgeon: Bhaskar Franklin DO;  Location: [facility]     SINUS SURGERY       TUBAL STERILIZATION         Social History     Tobacco Use     Smoking status: Never Smoker     Smokeless tobacco: Never Used     Tobacco comment: Tried to Quit (YES); QUIT in 1971; Passive Exposure (NO)   Substance Use Topics     Alcohol use: Yes     Comment: RARELY     Family History   Problem Relation Age of Onset     Breast Cancer Mother 66        Cause of Death     Parkinsonism Father         \"Possible\"     Coronary Artery Disease Father      Pacemaker Father      Thyroid " Disease Daughter      Diabetes No family hx of      Hypertension No family hx of      Hyperlipidemia No family hx of      Cerebrovascular Disease No family hx of      Colon Cancer No family hx of      Prostate Cancer No family hx of      Genetic Disorder No family hx of      Asthma No family hx of      Anesthesia Reaction No family hx of          Current Outpatient Medications   Medication Sig Dispense Refill     acyclovir (ZOVIRAX) 400 MG tablet Take 1 tablet (400 mg) by mouth 2 times daily Viral Prophylaxis. 60 tablet 3     acyclovir (ZOVIRAX) 400 MG tablet Take 1 tablet (400 mg) by mouth 2 times daily Viral Prophylaxis. 60 tablet 5     atorvastatin (LIPITOR) 10 MG tablet Take 1 tablet (10 mg) by mouth daily 90 tablet 3     cyclophosphamide (CYTOXAN) 50 MG capsule Take 4 capsules (200mg) by mouth every 7 days for 4 doses. Daily on Day 1,8,15 and 22. 16 capsule 3     dexamethasone (DECADRON) 4 MG tablet Take 40mg (10 tablets) daily on Days 1, 8 , 15 and 22. 120 tablet 0     dexamethasone (DECADRON) 4 MG tablet Take 10 tablets (40 mg) by mouth once a week On days 1, 8, 15. 30 tablet 3     EPINEPHrine (EPIPEN) 0.3 MG/0.3ML injection Inject 0.3 mg into the muscle once as needed. Use as needed for anaphylaxis.       fluticasone (FLONASE) 50 MCG/ACT nasal spray SPRAY 2 SPRAYS INTO BOTH NOSTRILS DAILY 48 mL 0     LORazepam (ATIVAN) 0.5 MG tablet Take 1 tablet (0.5 mg) by mouth every 4 hours as needed (Anxiety, Nausea/Vomiting or Sleep) 30 tablet 2     LORazepam (ATIVAN) 0.5 MG tablet Take 1 tablet (0.5 mg) by mouth every 4 hours as needed (Anxiety, Nausea/Vomiting or Sleep) 30 tablet 3     losartan (COZAAR) 50 MG tablet Take 1 tablet (50 mg) by mouth daily 90 tablet 3     citalopram (CELEXA) 20 MG tablet Take 1 tablet (20 mg) by mouth daily (Patient not taking: Reported on 10/1/2019) 90 tablet 3     ondansetron (ZOFRAN) 8 MG tablet Take 1 tablet (8 mg) by mouth every 8 hours as needed (Nausea/Vomiting) 10 tablet 2      "prochlorperazine (COMPAZINE) 10 MG tablet Take 1 tablet (10 mg) by mouth every 6 hours as needed (Nausea/Vomiting) (Patient not taking: Reported on 10/3/2019) 30 tablet 2     prochlorperazine (COMPAZINE) 10 MG tablet Take 1 tablet (10 mg) by mouth every 6 hours as needed (Nausea/Vomiting) (Patient not taking: Reported on 10/3/2019) 30 tablet 3     Allergies   Allergen Reactions     Lisinopril Cough     Phenylephrine Hcl Other (See Comments)     **Entex  HEADACHE (SEVERE)     Phenylpropanolamine Other (See Comments)     **Entex  HEADACHE (SEVERE)     Pseudoephedrine Tannate Other (See Comments)     **Entex  HEADACHE (SEVERE)     Levofloxacin Rash     **Levaquin     Moxifloxacin Hcl [Avelox] Rash     Recent Labs   Lab Test 09/30/19  1535 09/17/19  0815 08/27/19  1358  01/03/19  1043  11/17/17  0844 10/26/16  0921   LDL  --   --   --   --  86  --  108* 109*   HDL  --   --   --   --  42*  --  60 54   TRIG  --   --   --   --  126  --  78 115   ALT 15 19 31   < >  --    < > 27 21   CR 0.79 0.69 0.75   < >  --    < > 0.69 0.78   GFRESTIMATED 75 88 81   < >  --    < > 84 74   GFRESTBLACK 87 >90 >90   < >  --    < > >90 90   POTASSIUM 3.6 3.7 3.8   < >  --    < > 4.1 3.8   TSH  --   --   --   --   --   --  1.77 2.08    < > = values in this interval not displayed.      BP Readings from Last 3 Encounters:   10/03/19 136/86   10/01/19 127/88   09/30/19 120/78    Wt Readings from Last 3 Encounters:   10/03/19 68.5 kg (151 lb)   10/01/19 68.9 kg (151 lb 14.4 oz)   09/30/19 68.3 kg (150 lb 9.6 oz)                      Reviewed and updated as needed this visit by Provider         Review of Systems   ROS COMP: Constitutional, HEENT, cardiovascular, pulmonary, gi and gu systems are negative, except as otherwise noted.      Objective    /86 (BP Location: Right arm, Patient Position: Sitting, Cuff Size: Adult Regular)   Pulse 85   Temp 100  F (37.8  C) (Tympanic)   Resp 20   Ht 1.6 m (5' 3\")   Wt 68.5 kg (151 lb)   SpO2 98% " "  BMI 26.75 kg/m    Body mass index is 26.75 kg/m .  Physical Exam   GENERAL: healthy, alert and no distress  GENERAL: she is very fatigued. Under stress. tolerating well.   HENT: ear canals and TM's normal, nose and mouth without ulcers or lesions  NECK: no adenopathy, no asymmetry, masses, or scars and thyroid normal to palpation  RESP: lungs clear to auscultation - no rales, rhonchi or wheezes  CV: regular rate and rhythm, normal S1 S2, no S3 or S4, no murmur, click or rub, no peripheral edema and peripheral pulses strong  ABDOMEN: soft, nontender, no hepatosplenomegaly, no masses and bowel sounds normal  MS: no gross musculoskeletal defects noted, no edema    Diagnostic Test Results:  Labs reviewed in Epic  No results found for this or any previous visit (from the past 24 hour(s)).        Assessment & Plan     1. Mixed conductive and sensorineural hearing loss of both ears  Can't hear well. Seems to be worse. Thinking needing hearing tested. Will make arrangements.     2. MINAL (generalized anxiety disorder)  Refill. Has done well for her.  With her current illness and her husbands dementia and now pancreatic Cancer, life has gotten a little bit out of control. She is doing well.   - sertraline (ZOLOFT) 50 MG tablet; Take 1 tablet (50 mg) by mouth daily  Dispense: 30 tablet; Refill: 3    3. Multiple myeloma not having achieved remission (H)  She is not in remission. She is tolerating her treatment with a few set backs.  But going forward. With all that is going on she would like to see us back every 3 months and I do think keeping in the loop.           45 minutes in visit over 50 % face to face counseling.   BMI:   Estimated body mass index is 26.75 kg/m  as calculated from the following:    Height as of this encounter: 1.6 m (5' 3\").    Weight as of this encounter: 68.5 kg (151 lb).           See Patient Instructions    No follow-ups on file.    VENKATESH Trevino  [facility] - [place]      "

## 2019-10-04 ENCOUNTER — OFFICE VISIT (OUTPATIENT)
Dept: OTOLARYNGOLOGY | Facility: OTHER | Age: 69
End: 2019-10-04
Attending: PHYSICIAN ASSISTANT
Payer: COMMERCIAL

## 2019-10-04 VITALS
HEART RATE: 90 BPM | DIASTOLIC BLOOD PRESSURE: 86 MMHG | HEIGHT: 63 IN | TEMPERATURE: 97.8 F | BODY MASS INDEX: 26.75 KG/M2 | OXYGEN SATURATION: 98 % | SYSTOLIC BLOOD PRESSURE: 122 MMHG | WEIGHT: 151 LBS

## 2019-10-04 DIAGNOSIS — T85.618D NON-FUNCTIONING TYMPANOSTOMY TUBE, SUBSEQUENT ENCOUNTER: ICD-10-CM

## 2019-10-04 DIAGNOSIS — H69.93 ETD (EUSTACHIAN TUBE DYSFUNCTION), BILATERAL: ICD-10-CM

## 2019-10-04 DIAGNOSIS — H92.13 OTORRHEA, BILATERAL: Primary | ICD-10-CM

## 2019-10-04 PROCEDURE — 92504 EAR MICROSCOPY EXAMINATION: CPT | Performed by: PHYSICIAN ASSISTANT

## 2019-10-04 PROCEDURE — G0463 HOSPITAL OUTPT CLINIC VISIT: HCPCS

## 2019-10-04 PROCEDURE — 92504 EAR MICROSCOPY EXAMINATION: CPT

## 2019-10-04 PROCEDURE — 99213 OFFICE O/P EST LOW 20 MIN: CPT | Mod: 25 | Performed by: PHYSICIAN ASSISTANT

## 2019-10-04 RX ORDER — CIPROFLOXACIN AND DEXAMETHASONE 3; 1 MG/ML; MG/ML
4 SUSPENSION/ DROPS AURICULAR (OTIC) 2 TIMES DAILY
Qty: 2.8 ML | Refills: 1 | Status: SHIPPED | OUTPATIENT
Start: 2019-10-04 | End: 2019-10-23

## 2019-10-04 ASSESSMENT — ANXIETY QUESTIONNAIRES: GAD7 TOTAL SCORE: 2

## 2019-10-04 ASSESSMENT — MIFFLIN-ST. JEOR: SCORE: 1169.06

## 2019-10-04 ASSESSMENT — PAIN SCALES - GENERAL: PAINLEVEL: NO PAIN (1)

## 2019-10-04 NOTE — NURSING NOTE
"Chief Complaint   Patient presents with     Consult     Bilateral Plugged Tubes       Initial /86   Pulse 90   Temp 97.8  F (36.6  C) (Tympanic)   Ht 1.6 m (5' 3\")   Wt 68.5 kg (151 lb)   SpO2 98%   BMI 26.75 kg/m   Estimated body mass index is 26.75 kg/m  as calculated from the following:    Height as of this encounter: 1.6 m (5' 3\").    Weight as of this encounter: 68.5 kg (151 lb).  Medication Reconciliation: complete  Radha Patel LPN  "

## 2019-10-04 NOTE — PATIENT INSTRUCTIONS
Start Ciprodex 4 drops twice a day for 7 days to both ears. Then use for 1 day prior to appointment.  Return in 2 weeks for recheck.     Thank you for allowing Bridgette Ly PA-C and our ENT team to participate in your care.  If your medications are too expensive, please give the nurse a call.  We can possibly change this medication.  If you have a scheduling or an appointment question please contact our Health Unit Coordinator at their direct line [phone].   ALL nursing questions or concerns can be directed to your ENT nurse at: [phone] [place]

## 2019-10-04 NOTE — PROGRESS NOTES
Chief Complaint   Patient presents with     Consult     Bilateral Plugged Tubes     Patient reports her hearing remains decreased and feels her ears are plugged.   She felt her hearing loss was a sudden.   Tubes were placed in office with Dr. Ley on 9/20/19.   She did see her PCP and noted there was blockage w/ her tubes. She has felt her ears remain plugged.   Completed ear drops after 7 days. She does describe a lot of drainage following tubes.     She had crackling, popping present but over the last week it stopped. She felt that her ears plugged up and hard to hear again like they were prior to tube placement.   Past Medical History:   Diagnosis Date     Arthritis      Depressive disorder      Essential hypertension 10/1/2015     Major depressive disorder, recurrent episode, mild (H) 10/1/2015     Mixed hyperlipidemia 10/1/2015     Multiple myeloma not having achieved remission (H) 6/24/2019     Other specified disorders of bladder 07/09/2012    Irritable Bladder     Seasonal allergies 10/1/2015     Unspecified essential hypertension 03/19/2007     Unspecified sinusitis (chronic) 09/05/2007        Allergies   Allergen Reactions     Lisinopril Cough     Phenylephrine Hcl Other (See Comments)     **Entex  HEADACHE (SEVERE)     Phenylpropanolamine Other (See Comments)     **Entex  HEADACHE (SEVERE)     Pseudoephedrine Tannate Other (See Comments)     **Entex  HEADACHE (SEVERE)     Levofloxacin Rash     **Levaquin     Moxifloxacin Hcl [Avelox] Rash     Current Outpatient Medications   Medication     acyclovir (ZOVIRAX) 400 MG tablet     acyclovir (ZOVIRAX) 400 MG tablet     atorvastatin (LIPITOR) 10 MG tablet     cyclophosphamide (CYTOXAN) 50 MG capsule     dexamethasone (DECADRON) 4 MG tablet     dexamethasone (DECADRON) 4 MG tablet     EPINEPHrine (EPIPEN) 0.3 MG/0.3ML injection     fluticasone (FLONASE) 50 MCG/ACT nasal spray     LORazepam (ATIVAN) 0.5 MG tablet     LORazepam (ATIVAN) 0.5 MG tablet      "losartan (COZAAR) 50 MG tablet     ondansetron (ZOFRAN) 8 MG tablet     prochlorperazine (COMPAZINE) 10 MG tablet     prochlorperazine (COMPAZINE) 10 MG tablet     sertraline (ZOLOFT) 50 MG tablet     No current facility-administered medications for this visit.       ROS: 10 point ROS neg other than the symptoms noted above in the HPI.  /86   Pulse 90   Temp 97.8  F (36.6  C) (Tympanic)   Ht 1.6 m (5' 3\")   Wt 68.5 kg (151 lb)   SpO2 98%   BMI 26.75 kg/m    General - The patient is well nourished and well developed, and appears to have good nutritional status.  Alert and oriented to person and place, answers questions and cooperates with examination appropriately.   Head and Face - Normocephalic and atraumatic, with no gross asymmetry noted.  The facial nerve is intact, with strong symmetric movements.  No spontaneous nystagmus  Voice and Breathing - The patient was breathing comfortably without the use of accessory muscles. There was no wheezing, stridor, or stertor.  The patients voice was clear and strong, and had appropriate pitch and quality.  No blanka peripheral digital clubbing or cyanosis   Ears -examined under microscopy bilaterally  The external auditory canals are with dried crusting throughout. Tubes are in position but obstructed.   Right- Canal cleaned. Pick was used to open lumen. Thickened debris remaining on TM. Left TM- canal cleaned, and suctioned lumen.   Mouth - Examination of the oral cavity showed pink, healthy oral mucosa. No lesions or ulcerations noted.  The tongue was mobile and midline, and the dentition were in good condition.    Throat - The walls of the oropharynx were smooth, pink, moist, symmetric, and had no lesions or ulcerations.  The tonsillar pillars and soft palate were symmetric.  The uvula was midline on elevation.    Neck - No palpable enlarged fixed cervical lymph nodes.  No neck cysts or unusual tenderness to palpation.   No palpable fixed thyroid nodules or " concerning goiter.  The trachea is grossly midline.   Nose - External contour is symmetric, no gross deflection or scars.  Nasal mucosa is pink and moist with no abnormal mucus.  The septum and turbinates were evaluated: non obstructive.  No polyps, masses, or purulence noted on examination.      ASSESSMENT:    ICD-10-CM    1. Otorrhea, bilateral H92.13    2. ETD (Eustachian tube dysfunction), bilateral H69.83 ciprofloxacin-dexamethasone (CIPRODEX) 0.3-0.1 % otic suspension   3. Non-functioning tympanostomy tube, subsequent encounter T85.618D          Start Ciprodex 4 drops twice a day for 7 days to both ears. Then use for 1 day prior to appointment.  Return in 2 weeks for recheck.     Bridgette Ly PA-C  ENT  [facility], [place]  [phone]

## 2019-10-04 NOTE — LETTER
10/4/2019         RE: Carmelita Watts  [address] [facility] [address]        Dear Colleague,    Thank you for referring your patient, Carmelita Watts, to the [facility]. Please see a copy of my visit note below.    Chief Complaint   Patient presents with     Consult     Bilateral Plugged Tubes     Patient reports her hearing remains decreased and feels her ears are plugged.   She felt her hearing loss was a sudden.   Tubes were placed in office with Dr. Ley on 9/20/19.   She did see her PCP and noted there was blockage w/ her tubes. She has felt her ears remain plugged.   Completed ear drops after 7 days. She does describe a lot of drainage following tubes.     She had crackling, popping present but over the last week it stopped. She felt that her ears plugged up and hard to hear again like they were prior to tube placement.   Past Medical History:   Diagnosis Date     Arthritis      Depressive disorder      Essential hypertension 10/1/2015     Major depressive disorder, recurrent episode, mild (H) 10/1/2015     Mixed hyperlipidemia 10/1/2015     Multiple myeloma not having achieved remission (H) 6/24/2019     Other specified disorders of bladder 07/09/2012    Irritable Bladder     Seasonal allergies 10/1/2015     Unspecified essential hypertension 03/19/2007     Unspecified sinusitis (chronic) 09/05/2007        Allergies   Allergen Reactions     Lisinopril Cough     Phenylephrine Hcl Other (See Comments)     **Entex  HEADACHE (SEVERE)     Phenylpropanolamine Other (See Comments)     **Entex  HEADACHE (SEVERE)     Pseudoephedrine Tannate Other (See Comments)     **Entex  HEADACHE (SEVERE)     Levofloxacin Rash     **Levaquin     Moxifloxacin Hcl [Avelox] Rash     Current Outpatient Medications   Medication     acyclovir (ZOVIRAX) 400 MG tablet     acyclovir (ZOVIRAX) 400 MG tablet     atorvastatin (LIPITOR) 10 MG tablet     cyclophosphamide (CYTOXAN) 50 MG capsule      "dexamethasone (DECADRON) 4 MG tablet     dexamethasone (DECADRON) 4 MG tablet     EPINEPHrine (EPIPEN) 0.3 MG/0.3ML injection     fluticasone (FLONASE) 50 MCG/ACT nasal spray     LORazepam (ATIVAN) 0.5 MG tablet     LORazepam (ATIVAN) 0.5 MG tablet     losartan (COZAAR) 50 MG tablet     ondansetron (ZOFRAN) 8 MG tablet     prochlorperazine (COMPAZINE) 10 MG tablet     prochlorperazine (COMPAZINE) 10 MG tablet     sertraline (ZOLOFT) 50 MG tablet     No current facility-administered medications for this visit.       ROS: 10 point ROS neg other than the symptoms noted above in the HPI.  /86   Pulse 90   Temp 97.8  F (36.6  C) (Tympanic)   Ht 1.6 m (5' 3\")   Wt 68.5 kg (151 lb)   SpO2 98%   BMI 26.75 kg/m     General - The patient is well nourished and well developed, and appears to have good nutritional status.  Alert and oriented to person and place, answers questions and cooperates with examination appropriately.   Head and Face - Normocephalic and atraumatic, with no gross asymmetry noted.  The facial nerve is intact, with strong symmetric movements.  No spontaneous nystagmus  Voice and Breathing - The patient was breathing comfortably without the use of accessory muscles. There was no wheezing, stridor, or stertor.  The patients voice was clear and strong, and had appropriate pitch and quality.  No blanka peripheral digital clubbing or cyanosis   Ears -examined under microscopy bilaterally  The external auditory canals are with dried crusting throughout. Tubes are in position but obstructed.   Right- Canal cleaned. Pick was used to open lumen. Thickened debris remaining on TM. Left TM- canal cleaned, and suctioned lumen.   Mouth - Examination of the oral cavity showed pink, healthy oral mucosa. No lesions or ulcerations noted.  The tongue was mobile and midline, and the dentition were in good condition.    Throat - The walls of the oropharynx were smooth, pink, moist, symmetric, and had no lesions or " ulcerations.  The tonsillar pillars and soft palate were symmetric.  The uvula was midline on elevation.    Neck - No palpable enlarged fixed cervical lymph nodes.  No neck cysts or unusual tenderness to palpation.   No palpable fixed thyroid nodules or concerning goiter.  The trachea is grossly midline.   Nose - External contour is symmetric, no gross deflection or scars.  Nasal mucosa is pink and moist with no abnormal mucus.  The septum and turbinates were evaluated: non obstructive.  No polyps, masses, or purulence noted on examination.      ASSESSMENT:    ICD-10-CM    1. Otorrhea, bilateral H92.13    2. ETD (Eustachian tube dysfunction), bilateral H69.83 ciprofloxacin-dexamethasone (CIPRODEX) 0.3-0.1 % otic suspension   3. Non-functioning tympanostomy tube, subsequent encounter T85.618D          Start Ciprodex 4 drops twice a day for 7 days to both ears. Then use for 1 day prior to appointment.  Return in 2 weeks for recheck.     Bridgette Ly PA-C  ENT  [facility]  [phone]      Again, thank you for allowing me to participate in the care of your patient.        Sincerely,        Bridgette Ly PA-C

## 2019-10-08 ENCOUNTER — APPOINTMENT (OUTPATIENT)
Dept: LAB | Facility: OTHER | Age: 69
End: 2019-10-08
Attending: INTERNAL MEDICINE
Payer: MEDICARE

## 2019-10-08 ENCOUNTER — INFUSION THERAPY VISIT (OUTPATIENT)
Dept: INFUSION THERAPY | Facility: OTHER | Age: 69
End: 2019-10-08
Attending: INTERNAL MEDICINE
Payer: MEDICARE

## 2019-10-08 VITALS
BODY MASS INDEX: 26.54 KG/M2 | HEIGHT: 63 IN | WEIGHT: 149.8 LBS | DIASTOLIC BLOOD PRESSURE: 76 MMHG | HEART RATE: 84 BPM | OXYGEN SATURATION: 96 % | SYSTOLIC BLOOD PRESSURE: 124 MMHG | TEMPERATURE: 98.7 F

## 2019-10-08 DIAGNOSIS — C90.00 MULTIPLE MYELOMA NOT HAVING ACHIEVED REMISSION (H): Primary | ICD-10-CM

## 2019-10-08 LAB
BASOPHILS # BLD AUTO: 0 10E9/L (ref 0–0.2)
BASOPHILS NFR BLD AUTO: 0.4 %
DIFFERENTIAL METHOD BLD: ABNORMAL
EOSINOPHIL # BLD AUTO: 0.1 10E9/L (ref 0–0.7)
EOSINOPHIL NFR BLD AUTO: 2 %
ERYTHROCYTE [DISTWIDTH] IN BLOOD BY AUTOMATED COUNT: 16 % (ref 10–15)
HCT VFR BLD AUTO: 28.4 % (ref 35–47)
HGB BLD-MCNC: 9.5 G/DL (ref 11.7–15.7)
IMM GRANULOCYTES # BLD: 0.1 10E9/L (ref 0–0.4)
IMM GRANULOCYTES NFR BLD: 1.6 %
LYMPHOCYTES # BLD AUTO: 1.6 10E9/L (ref 0.8–5.3)
LYMPHOCYTES NFR BLD AUTO: 28.6 %
MCH RBC QN AUTO: 31.5 PG (ref 26.5–33)
MCHC RBC AUTO-ENTMCNC: 33.5 G/DL (ref 31.5–36.5)
MCV RBC AUTO: 94 FL (ref 78–100)
MONOCYTES # BLD AUTO: 1 10E9/L (ref 0–1.3)
MONOCYTES NFR BLD AUTO: 17.5 %
NEUTROPHILS # BLD AUTO: 2.8 10E9/L (ref 1.6–8.3)
NEUTROPHILS NFR BLD AUTO: 49.9 %
NRBC # BLD AUTO: 0 10*3/UL
NRBC BLD AUTO-RTO: 0 /100
PLATELET # BLD AUTO: 193 10E9/L (ref 150–450)
RBC # BLD AUTO: 3.02 10E12/L (ref 3.8–5.2)
WBC # BLD AUTO: 5.6 10E9/L (ref 4–11)

## 2019-10-08 PROCEDURE — 25000128 H RX IP 250 OP 636: Performed by: INTERNAL MEDICINE

## 2019-10-08 PROCEDURE — 36415 COLL VENOUS BLD VENIPUNCTURE: CPT | Mod: ZL | Performed by: INTERNAL MEDICINE

## 2019-10-08 PROCEDURE — 96401 CHEMO ANTI-NEOPL SQ/IM: CPT

## 2019-10-08 PROCEDURE — 25000131 ZZH RX MED GY IP 250 OP 636 PS 637: Performed by: INTERNAL MEDICINE

## 2019-10-08 PROCEDURE — 85025 COMPLETE CBC W/AUTO DIFF WBC: CPT | Mod: ZL | Performed by: INTERNAL MEDICINE

## 2019-10-08 RX ORDER — ONDANSETRON 4 MG/1
8 TABLET, FILM COATED ORAL ONCE
Status: COMPLETED | OUTPATIENT
Start: 2019-10-08 | End: 2019-10-08

## 2019-10-08 RX ADMIN — ONDANSETRON HYDROCHLORIDE 8 MG: 4 TABLET, FILM COATED ORAL at 13:42

## 2019-10-08 RX ADMIN — BORTEZOMIB 2.6 MG: 3.5 INJECTION, POWDER, LYOPHILIZED, FOR SOLUTION INTRAVENOUS; SUBCUTANEOUS at 14:03

## 2019-10-08 ASSESSMENT — PAIN SCALES - GENERAL: PAINLEVEL: NO PAIN (0)

## 2019-10-08 ASSESSMENT — MIFFLIN-ST. JEOR: SCORE: 1163.49

## 2019-10-08 NOTE — PROGRESS NOTES
Patient is a 69 year old female here today for injection of Velcade per order of . Patient meets parameters for today's infusion. Patient identified with two identifiers, order verified, and verbal consent for today's infusion obtained from patient. Written consent for treatment is on file and valid.    Recent lab values: WBC: 5.6, HGB: 9.5, PLT: 193  ANC: 2.8. Patient meets order parameters for today's treatment.    Patient denies questions or concerns regarding injection and/or medication(s) being administered.    Independent dose check with Julia Anderson RN.    Velcade injected SQ into left upper arm at 45 degree angle per protocol rotating sites.     Injection administered per protocol. Patient tolerated infusion without incident, no signs or symptoms of adverse reaction noted. Patient denies pain or discomfort.     Covered with a sterile bandage. Pt instructed to leave bandage intact for a minimum of one hour, and to call with questions or concerns. Patient states understanding, discharged ambulatory.

## 2019-10-08 NOTE — PROGRESS NOTES
[facility] PRO-CTCAE    1. In the last 7 days, how OFTEN did you have nausea ? Never      In the last 7 days, what was the SEVERITY of your NAUSEA at its WORST? None     2. In the last 7 days, what was the SEVERITY of your CONSTIPATION at its WORST? None    3. In the last 7 days, how OFTEN did you have PAIN? Occasionally      In the last 7 days, what was the SEVERITY of your PAIN at its WORST? Mild      In the last 7 days, how much did PAIN INTERFERE with your usual or daily activities? A little bit     Patient reports she will occasionally have mild pain to hip if overuses, pain to ear is receding and almost nonexistent.    Do you have any other symptoms that you wish to report ? No    Please list any other symptoms:        Note routed to Provider for review No

## 2019-10-15 ENCOUNTER — INFUSION THERAPY VISIT (OUTPATIENT)
Dept: INFUSION THERAPY | Facility: OTHER | Age: 69
End: 2019-10-15
Attending: INTERNAL MEDICINE
Payer: MEDICARE

## 2019-10-15 ENCOUNTER — TELEPHONE (OUTPATIENT)
Dept: INFUSION THERAPY | Facility: OTHER | Age: 69
End: 2019-10-15

## 2019-10-15 ENCOUNTER — APPOINTMENT (OUTPATIENT)
Dept: LAB | Facility: OTHER | Age: 69
End: 2019-10-15
Attending: INTERNAL MEDICINE
Payer: MEDICARE

## 2019-10-15 VITALS
SYSTOLIC BLOOD PRESSURE: 102 MMHG | RESPIRATION RATE: 18 BRPM | TEMPERATURE: 98.9 F | DIASTOLIC BLOOD PRESSURE: 66 MMHG | OXYGEN SATURATION: 97 % | HEART RATE: 79 BPM | HEIGHT: 63 IN | WEIGHT: 149.3 LBS | BODY MASS INDEX: 26.45 KG/M2

## 2019-10-15 DIAGNOSIS — C90.00 MULTIPLE MYELOMA NOT HAVING ACHIEVED REMISSION (H): Primary | ICD-10-CM

## 2019-10-15 LAB
BASOPHILS # BLD AUTO: 0 10E9/L (ref 0–0.2)
BASOPHILS NFR BLD AUTO: 0.2 %
DIFFERENTIAL METHOD BLD: ABNORMAL
EOSINOPHIL # BLD AUTO: 0.1 10E9/L (ref 0–0.7)
EOSINOPHIL NFR BLD AUTO: 1.5 %
ERYTHROCYTE [DISTWIDTH] IN BLOOD BY AUTOMATED COUNT: 15.9 % (ref 10–15)
HCT VFR BLD AUTO: 28.8 % (ref 35–47)
HGB BLD-MCNC: 9.9 G/DL (ref 11.7–15.7)
IMM GRANULOCYTES # BLD: 0.1 10E9/L (ref 0–0.4)
IMM GRANULOCYTES NFR BLD: 1.5 %
LYMPHOCYTES # BLD AUTO: 1.4 10E9/L (ref 0.8–5.3)
LYMPHOCYTES NFR BLD AUTO: 23.1 %
MCH RBC QN AUTO: 31.7 PG (ref 26.5–33)
MCHC RBC AUTO-ENTMCNC: 34.4 G/DL (ref 31.5–36.5)
MCV RBC AUTO: 92 FL (ref 78–100)
MONOCYTES # BLD AUTO: 0.9 10E9/L (ref 0–1.3)
MONOCYTES NFR BLD AUTO: 15.5 %
NEUTROPHILS # BLD AUTO: 3.5 10E9/L (ref 1.6–8.3)
NEUTROPHILS NFR BLD AUTO: 58.2 %
NRBC # BLD AUTO: 0 10*3/UL
NRBC BLD AUTO-RTO: 0 /100
PLATELET # BLD AUTO: 180 10E9/L (ref 150–450)
RBC # BLD AUTO: 3.12 10E12/L (ref 3.8–5.2)
WBC # BLD AUTO: 6.1 10E9/L (ref 4–11)

## 2019-10-15 PROCEDURE — 96401 CHEMO ANTI-NEOPL SQ/IM: CPT

## 2019-10-15 PROCEDURE — 25000131 ZZH RX MED GY IP 250 OP 636 PS 637: Performed by: INTERNAL MEDICINE

## 2019-10-15 PROCEDURE — 85025 COMPLETE CBC W/AUTO DIFF WBC: CPT | Mod: ZL | Performed by: INTERNAL MEDICINE

## 2019-10-15 PROCEDURE — 25000128 H RX IP 250 OP 636: Performed by: INTERNAL MEDICINE

## 2019-10-15 PROCEDURE — 36415 COLL VENOUS BLD VENIPUNCTURE: CPT | Mod: ZL | Performed by: INTERNAL MEDICINE

## 2019-10-15 RX ORDER — ONDANSETRON 4 MG/1
8 TABLET, FILM COATED ORAL ONCE
Status: COMPLETED | OUTPATIENT
Start: 2019-10-15 | End: 2019-10-15

## 2019-10-15 RX ADMIN — ONDANSETRON HYDROCHLORIDE 8 MG: 4 TABLET, FILM COATED ORAL at 10:05

## 2019-10-15 RX ADMIN — BORTEZOMIB 2.6 MG: 3.5 INJECTION, POWDER, LYOPHILIZED, FOR SOLUTION INTRAVENOUS; SUBCUTANEOUS at 10:45

## 2019-10-15 ASSESSMENT — MIFFLIN-ST. JEOR: SCORE: 1161.22

## 2019-10-15 ASSESSMENT — PAIN SCALES - GENERAL: PAINLEVEL: NO PAIN (0)

## 2019-10-15 NOTE — TELEPHONE ENCOUNTER
"Patient here today asking about why she isn't scheduled for another Zometa. Plan was discontinued by you, noting \"progression.\" I did not see any documentation, to be able to give her any further information.    Is she supposed to be done with her Zometa or should plan be reinstated?     Please advise.   "

## 2019-10-15 NOTE — PROGRESS NOTES
Patient is a 69 year old female her today for injection of Velcade per order of . Patient meets parameters for today's infusion. Patient identified with two identifiers, order verified, and verbal consent for today's infusion obtained from patient. Written consent for treatment is on file and valid.    Recent lab values: WBC: 6.1, HGB: 9.9, PLT: 180  ANC: 3.5. Patient meets order parameters for today's treatment.    Patient denies questions or concerns regarding injection and/or medication(s) being administered.    Independent dose check complete with Barron Booth RN    Velcade injected SQ into right upper outer arm 45 degree angle  per protocol rotating sites.     Injection administered per protocol. Patient tolerated infusion without incident, no signs or symptoms of adverse reaction noted. Patient denies pain or discomfort.     Covered with a sterile bandage. Pt instructed to leave bandage intact for a minimum of one hour, and to call with questions or concerns.  Patient states understanding, discharged ambulatory.

## 2019-10-16 ENCOUNTER — TELEPHONE (OUTPATIENT)
Dept: ONCOLOGY | Facility: OTHER | Age: 69
End: 2019-10-16

## 2019-10-16 RX ORDER — ZOLEDRONIC ACID 0.04 MG/ML
4 INJECTION, SOLUTION INTRAVENOUS ONCE
Status: CANCELLED | OUTPATIENT
Start: 2019-10-16

## 2019-10-16 NOTE — TELEPHONE ENCOUNTER
Spoke with Bonita Quintana NP regarding patient's Zometa. Per Bonita, patient needs the Zometa. Zometa plan entered. Patient will receive Zometa next week when she comes for her Velcade. HUC aware to update appointments.     Call placed to patient to inform her of the need for Zometa. Patient aware she will get Zometa next week.

## 2019-10-22 ENCOUNTER — INFUSION THERAPY VISIT (OUTPATIENT)
Dept: INFUSION THERAPY | Facility: OTHER | Age: 69
End: 2019-10-22
Attending: INTERNAL MEDICINE
Payer: MEDICARE

## 2019-10-22 VITALS — HEIGHT: 63 IN | WEIGHT: 149.47 LBS | BODY MASS INDEX: 26.48 KG/M2

## 2019-10-22 DIAGNOSIS — C90.00 MULTIPLE MYELOMA NOT HAVING ACHIEVED REMISSION (H): Primary | ICD-10-CM

## 2019-10-22 DIAGNOSIS — C90.00 MULTIPLE MYELOMA NOT HAVING ACHIEVED REMISSION (H): ICD-10-CM

## 2019-10-22 LAB
ALBUMIN SERPL-MCNC: 3.2 G/DL (ref 3.4–5)
ALP SERPL-CCNC: 99 U/L (ref 40–150)
ALT SERPL W P-5'-P-CCNC: 21 U/L (ref 0–50)
ANION GAP SERPL CALCULATED.3IONS-SCNC: 3 MMOL/L (ref 3–14)
AST SERPL W P-5'-P-CCNC: 19 U/L (ref 0–45)
BASOPHILS # BLD AUTO: 0 10E9/L (ref 0–0.2)
BASOPHILS NFR BLD AUTO: 0.2 %
BILIRUB SERPL-MCNC: 0.3 MG/DL (ref 0.2–1.3)
BUN SERPL-MCNC: 19 MG/DL (ref 7–30)
CALCIUM SERPL-MCNC: 8.8 MG/DL (ref 8.5–10.1)
CHLORIDE SERPL-SCNC: 104 MMOL/L (ref 94–109)
CO2 SERPL-SCNC: 27 MMOL/L (ref 20–32)
CREAT SERPL-MCNC: 0.67 MG/DL (ref 0.52–1.04)
DIFFERENTIAL METHOD BLD: ABNORMAL
EOSINOPHIL # BLD AUTO: 0.1 10E9/L (ref 0–0.7)
EOSINOPHIL NFR BLD AUTO: 1.9 %
ERYTHROCYTE [DISTWIDTH] IN BLOOD BY AUTOMATED COUNT: 15.9 % (ref 10–15)
FERRITIN SERPL-MCNC: 654 NG/ML (ref 8–252)
GFR SERPL CREATININE-BSD FRML MDRD: 88 ML/MIN/{1.73_M2}
GLUCOSE SERPL-MCNC: 113 MG/DL (ref 70–99)
HCT VFR BLD AUTO: 29.7 % (ref 35–47)
HGB BLD-MCNC: 10 G/DL (ref 11.7–15.7)
IMM GRANULOCYTES # BLD: 0.1 10E9/L (ref 0–0.4)
IMM GRANULOCYTES NFR BLD: 1.5 %
IRON SATN MFR SERPL: 28 % (ref 15–46)
IRON SERPL-MCNC: 76 UG/DL (ref 35–180)
LDH SERPL L TO P-CCNC: 136 U/L (ref 81–234)
LYMPHOCYTES # BLD AUTO: 1.3 10E9/L (ref 0.8–5.3)
LYMPHOCYTES NFR BLD AUTO: 23.3 %
MCH RBC QN AUTO: 31.6 PG (ref 26.5–33)
MCHC RBC AUTO-ENTMCNC: 33.7 G/DL (ref 31.5–36.5)
MCV RBC AUTO: 94 FL (ref 78–100)
MONOCYTES # BLD AUTO: 0.8 10E9/L (ref 0–1.3)
MONOCYTES NFR BLD AUTO: 15.1 %
NEUTROPHILS # BLD AUTO: 3.1 10E9/L (ref 1.6–8.3)
NEUTROPHILS NFR BLD AUTO: 58 %
NRBC # BLD AUTO: 0 10*3/UL
NRBC BLD AUTO-RTO: 0 /100
PLATELET # BLD AUTO: 234 10E9/L (ref 150–450)
POTASSIUM SERPL-SCNC: 3.3 MMOL/L (ref 3.4–5.3)
PROT SERPL-MCNC: 10.1 G/DL (ref 6.8–8.8)
RBC # BLD AUTO: 3.16 10E12/L (ref 3.8–5.2)
SODIUM SERPL-SCNC: 134 MMOL/L (ref 133–144)
TIBC SERPL-MCNC: 270 UG/DL (ref 240–430)
WBC # BLD AUTO: 5.4 10E9/L (ref 4–11)

## 2019-10-22 PROCEDURE — 85025 COMPLETE CBC W/AUTO DIFF WBC: CPT | Mod: ZL | Performed by: INTERNAL MEDICINE

## 2019-10-22 PROCEDURE — 82746 ASSAY OF FOLIC ACID SERUM: CPT | Mod: ZL | Performed by: INTERNAL MEDICINE

## 2019-10-22 PROCEDURE — 82728 ASSAY OF FERRITIN: CPT | Mod: ZL | Performed by: INTERNAL MEDICINE

## 2019-10-22 PROCEDURE — 82784 ASSAY IGA/IGD/IGG/IGM EACH: CPT | Mod: ZL | Performed by: INTERNAL MEDICINE

## 2019-10-22 PROCEDURE — 86334 IMMUNOFIX E-PHORESIS SERUM: CPT | Mod: ZL | Performed by: INTERNAL MEDICINE

## 2019-10-22 PROCEDURE — 96367 TX/PROPH/DG ADDL SEQ IV INF: CPT

## 2019-10-22 PROCEDURE — 80053 COMPREHEN METABOLIC PANEL: CPT | Mod: ZL | Performed by: INTERNAL MEDICINE

## 2019-10-22 PROCEDURE — 82232 ASSAY OF BETA-2 PROTEIN: CPT | Mod: ZL | Performed by: INTERNAL MEDICINE

## 2019-10-22 PROCEDURE — 96401 CHEMO ANTI-NEOPL SQ/IM: CPT

## 2019-10-22 PROCEDURE — 96365 THER/PROPH/DIAG IV INF INIT: CPT

## 2019-10-22 PROCEDURE — 83540 ASSAY OF IRON: CPT | Mod: ZL | Performed by: INTERNAL MEDICINE

## 2019-10-22 PROCEDURE — 82607 VITAMIN B-12: CPT | Mod: ZL | Performed by: INTERNAL MEDICINE

## 2019-10-22 PROCEDURE — 36415 COLL VENOUS BLD VENIPUNCTURE: CPT | Mod: ZL | Performed by: INTERNAL MEDICINE

## 2019-10-22 PROCEDURE — 25000128 H RX IP 250 OP 636: Mod: JW | Performed by: INTERNAL MEDICINE

## 2019-10-22 PROCEDURE — 00000402 ZZHCL STATISTIC TOTAL PROTEIN: Mod: ZL | Performed by: INTERNAL MEDICINE

## 2019-10-22 PROCEDURE — 85810 BLOOD VISCOSITY EXAMINATION: CPT | Mod: ZL | Performed by: INTERNAL MEDICINE

## 2019-10-22 PROCEDURE — 25000128 H RX IP 250 OP 636: Performed by: NURSE PRACTITIONER

## 2019-10-22 PROCEDURE — 83615 LACTATE (LD) (LDH) ENZYME: CPT | Mod: ZL | Performed by: INTERNAL MEDICINE

## 2019-10-22 PROCEDURE — 83550 IRON BINDING TEST: CPT | Mod: ZL | Performed by: INTERNAL MEDICINE

## 2019-10-22 PROCEDURE — 84165 PROTEIN E-PHORESIS SERUM: CPT | Mod: ZL | Performed by: INTERNAL MEDICINE

## 2019-10-22 RX ORDER — ONDANSETRON 8 MG/1
8 TABLET, FILM COATED ORAL ONCE
Status: COMPLETED | OUTPATIENT
Start: 2019-10-22 | End: 2019-10-22

## 2019-10-22 RX ORDER — ZOLEDRONIC ACID 0.04 MG/ML
4 INJECTION, SOLUTION INTRAVENOUS ONCE
Status: COMPLETED | OUTPATIENT
Start: 2019-10-22 | End: 2019-10-22

## 2019-10-22 RX ORDER — ZOLEDRONIC ACID 0.04 MG/ML
4 INJECTION, SOLUTION INTRAVENOUS ONCE
Status: CANCELLED | OUTPATIENT
Start: 2020-04-19

## 2019-10-22 RX ADMIN — BORTEZOMIB 2.6 MG: 3.5 INJECTION, POWDER, LYOPHILIZED, FOR SOLUTION INTRAVENOUS; SUBCUTANEOUS at 14:19

## 2019-10-22 RX ADMIN — ZOLEDRONIC ACID 4 MG: 0.04 INJECTION, SOLUTION INTRAVENOUS at 14:24

## 2019-10-22 RX ADMIN — ONDANSETRON HYDROCHLORIDE 8 MG: 4 TABLET, FILM COATED ORAL at 14:15

## 2019-10-22 ASSESSMENT — MIFFLIN-ST. JEOR: SCORE: 1161.97

## 2019-10-22 NOTE — PROGRESS NOTES
Patient is a 69 year old female her today for injection of Velcade per order of . Patient meets parameters for today's infusion. Patient identified with two identifiers, order verified, and verbal consent for today's infusion obtained from patient. Written consent for treatment is on file and valid.    Recent lab values: WBC: 5.4, HGB: 10.0, PLT: 234  ANC: 3.1. Patient meets order parameters for today's treatment.  Patient denies questions or concerns regarding injection and/or medication(s) being administered.  Velcade injected SQ into left upper outer arm  per protocol rotating sites.     Injection administered per protocol. Patient tolerated infusion without incident, no signs or symptoms of adverse reaction noted. Patient denies pain or discomfort.     Covered with a sterile bandage. Pt instructed to leave bandage intact for a minimum of one hour, and to call with questions or concerns. Copy of appointments, discharge instructions, and after visit summary (AVS) provided to patient. Patient states understanding, discharged ambulatory.

## 2019-10-23 ENCOUNTER — OFFICE VISIT (OUTPATIENT)
Dept: OTOLARYNGOLOGY | Facility: OTHER | Age: 69
End: 2019-10-23
Attending: PHYSICIAN ASSISTANT
Payer: COMMERCIAL

## 2019-10-23 VITALS
OXYGEN SATURATION: 98 % | BODY MASS INDEX: 26.4 KG/M2 | DIASTOLIC BLOOD PRESSURE: 74 MMHG | SYSTOLIC BLOOD PRESSURE: 128 MMHG | TEMPERATURE: 97.9 F | HEIGHT: 63 IN | WEIGHT: 149 LBS | HEART RATE: 80 BPM

## 2019-10-23 DIAGNOSIS — Z92.21 STATUS POST CHEMOTHERAPY: ICD-10-CM

## 2019-10-23 DIAGNOSIS — H69.93 ETD (EUSTACHIAN TUBE DYSFUNCTION), BILATERAL: ICD-10-CM

## 2019-10-23 DIAGNOSIS — Z96.22 S/P MYRINGOTOMY WITH INSERTION OF TUBE: Primary | ICD-10-CM

## 2019-10-23 LAB
FOLATE SERPL-MCNC: 18.2 NG/ML
VIT B12 SERPL-MCNC: 375 PG/ML (ref 193–986)

## 2019-10-23 PROCEDURE — 92504 EAR MICROSCOPY EXAMINATION: CPT | Performed by: PHYSICIAN ASSISTANT

## 2019-10-23 PROCEDURE — 99213 OFFICE O/P EST LOW 20 MIN: CPT | Mod: 25 | Performed by: PHYSICIAN ASSISTANT

## 2019-10-23 PROCEDURE — 92504 EAR MICROSCOPY EXAMINATION: CPT

## 2019-10-23 PROCEDURE — G0463 HOSPITAL OUTPT CLINIC VISIT: HCPCS

## 2019-10-23 ASSESSMENT — MIFFLIN-ST. JEOR: SCORE: 1159.99

## 2019-10-23 ASSESSMENT — PAIN SCALES - GENERAL: PAINLEVEL: NO PAIN (0)

## 2019-10-23 NOTE — LETTER
10/23/2019         RE: Carmelita Watts  [address] [facility] [address]        Dear Colleague,    Thank you for referring your patient, Carmelita Watts, to the [facility]. Please see a copy of my visit note below.        Chief Complaint   Patient presents with     Ear Drainage     Pt is here for a f/u bilateral otorrhea.  Pt was placed on ciprodex.       Carmelita returns to ENT for recheck of her ears. She was last seen on 10/4/19 and had increase in plugged feeling. Her tubes were noted to be obstructed and she was started on otics.   I was able to suction her left tube lumen and had improvement.   She feels like her ears are open. She has less fullness and pressure now.     Tubes were placed in office with Dr. Ley on 9/20/19.   She did see her PCP and noted there was blockage w/ her tubes. She has felt her ears remain plugged.   Completed ear drops after 7 days. She does describe a lot of drainage following tubes.   No drainage.   She has occasional crackling at times.   Hearing has cherise improving over the days.   She has been using Cory med and Flonase.   She has been on taking Zyrtec daily.         Past Medical History:   Diagnosis Date     Arthritis      Depressive disorder      Essential hypertension 10/1/2015     Major depressive disorder, recurrent episode, mild (H) 10/1/2015     Mixed hyperlipidemia 10/1/2015     Multiple myeloma not having achieved remission (H) 6/24/2019     Other specified disorders of bladder 07/09/2012    Irritable Bladder     Seasonal allergies 10/1/2015     Unspecified essential hypertension 03/19/2007     Unspecified sinusitis (chronic) 09/05/2007        Allergies   Allergen Reactions     Lisinopril Cough     Phenylephrine Hcl Other (See Comments)     **Entex  HEADACHE (SEVERE)     Phenylpropanolamine Other (See Comments)     **Entex  HEADACHE (SEVERE)     Pseudoephedrine Tannate Other (See Comments)     **Entex  HEADACHE (SEVERE)     Levofloxacin  "Rash     **Levaquin     Moxifloxacin Hcl [Avelox] Rash     Current Outpatient Medications   Medication     acyclovir (ZOVIRAX) 400 MG tablet     acyclovir (ZOVIRAX) 400 MG tablet     atorvastatin (LIPITOR) 10 MG tablet     cyclophosphamide (CYTOXAN) 50 MG capsule     dexamethasone (DECADRON) 4 MG tablet     dexamethasone (DECADRON) 4 MG tablet     EPINEPHrine (EPIPEN) 0.3 MG/0.3ML injection     fluticasone (FLONASE) 50 MCG/ACT nasal spray     LORazepam (ATIVAN) 0.5 MG tablet     LORazepam (ATIVAN) 0.5 MG tablet     losartan (COZAAR) 50 MG tablet     ondansetron (ZOFRAN) 8 MG tablet     prochlorperazine (COMPAZINE) 10 MG tablet     prochlorperazine (COMPAZINE) 10 MG tablet     sertraline (ZOLOFT) 50 MG tablet     No current facility-administered medications for this visit.       ROS: 10 point ROS neg other than the symptoms noted above in the HPI.  /74   Pulse 80   Temp 97.9  F (36.6  C) (Tympanic)   Ht 1.6 m (5' 3\")   Wt 67.6 kg (149 lb)   SpO2 98%   BMI 26.39 kg/m       General - The patient is well nourished and well developed, and appears to have good nutritional status.  Alert and oriented to person and place, answers questions and cooperates with examination appropriately.   Head and Face - Normocephalic and atraumatic, with no gross asymmetry noted.  The facial nerve is intact, with strong symmetric movements.  No spontaneous nystagmus  Voice and Breathing - The patient was breathing comfortably without the use of accessory muscles. There was no wheezing, stridor, or stertor.  The patients voice was clear and strong, and had appropriate pitch and quality.  No blanka peripheral digital clubbing or cyanosis   Ears -examined under microscopy bilaterally  The external auditory canals are  clear.  Bilateral tubes are in good position and patent. I removed crusting along bilateral tm and canal with cupped forceps and pick.   Mouth - Examination of the oral cavity showed pink, healthy oral mucosa. No " lesions or ulcerations noted.  The tongue was mobile and midline, and the dentition were in good condition.    Throat - The walls of the oropharynx were smooth, pink, moist, symmetric, and had no lesions or ulcerations.  The tonsillar pillars and soft palate were symmetric.  The uvula was midline on elevation.    Neck - No palpable enlarged fixed cervical lymph nodes.  No neck cysts or unusual tenderness to palpation.   No palpable fixed thyroid nodules or concerning goiter.  The trachea is grossly midline.   Nose - External contour is symmetric, no gross deflection or scars.  Nasal mucosa is pink and moist with no abnormal mucus.  The septum and turbinates were evaluated: non obstructive.  No polyps, masses, or purulence noted on examination.         ASSESSMENT:    ICD-10-CM    1. S/P myringotomy with insertion of tube Z96.22    2. ETD (Eustachian tube dysfunction), bilateral H69.83    3. Status post chemotherapy Z92.21        Ears look well  Removed crusting along TM/ tubes.     No fluid or infection present.   Return for hearing test on 11/7  If you develop moisture/ drainage/ blockage-contact nurse        Bridgette Ly PA-C  ENT  [facility]  [phone]      Again, thank you for allowing me to participate in the care of your patient.        Sincerely,        Bridgette Ly PA-C

## 2019-10-23 NOTE — PATIENT INSTRUCTIONS
Ears look well  Removed crusting along TM/ tubes.     No fluid or infection present.   Return for hearing test on 11/7  If you develop moisture/ drainage/ blockage-contact nurse    Thank you for allowing Bridgette Ly PA-C and our ENT team to participate in your care.  If your medications are too expensive, please give the nurse a call.  We can possibly change this medication.  If you have a scheduling or an appointment question please contact our Health Unit Coordinator at their direct line [phone].   ALL nursing questions or concerns can be directed to your ENT nurse at: [phone] Ruby

## 2019-10-23 NOTE — NURSING NOTE
"Chief Complaint   Patient presents with     Ear Drainage     Pt is here for a f/u bilateral otorrhea.  Pt was placed on ciprodex.       Initial /74   Pulse 80   Temp 97.9  F (36.6  C) (Tympanic)   Ht 1.6 m (5' 3\")   Wt 67.6 kg (149 lb)   SpO2 98%   BMI 26.39 kg/m   Estimated body mass index is 26.39 kg/m  as calculated from the following:    Height as of this encounter: 1.6 m (5' 3\").    Weight as of this encounter: 67.6 kg (149 lb).  Medication Reconciliation: complete  Radha Patel LPN  "

## 2019-10-23 NOTE — PROGRESS NOTES
Chief Complaint   Patient presents with     Ear Drainage     Pt is here for a f/u bilateral otorrhea.  Pt was placed on ciprodex.       Carmelita returns to ENT for recheck of her ears. She was last seen on 10/4/19 and had increase in plugged feeling. Her tubes were noted to be obstructed and she was started on otics.   I was able to suction her left tube lumen and had improvement.   She feels like her ears are open. She has less fullness and pressure now.     Tubes were placed in office with Dr. Ley on 9/20/19.   She did see her PCP and noted there was blockage w/ her tubes. She has felt her ears remain plugged.   Completed ear drops after 7 days. She does describe a lot of drainage following tubes.   No drainage.   She has occasional crackling at times.   Hearing has cherise improving over the days.   She has been using Cory med and Flonase.   She has been on taking Zyrtec daily.         Past Medical History:   Diagnosis Date     Arthritis      Depressive disorder      Essential hypertension 10/1/2015     Major depressive disorder, recurrent episode, mild (H) 10/1/2015     Mixed hyperlipidemia 10/1/2015     Multiple myeloma not having achieved remission (H) 6/24/2019     Other specified disorders of bladder 07/09/2012    Irritable Bladder     Seasonal allergies 10/1/2015     Unspecified essential hypertension 03/19/2007     Unspecified sinusitis (chronic) 09/05/2007        Allergies   Allergen Reactions     Lisinopril Cough     Phenylephrine Hcl Other (See Comments)     **Entex  HEADACHE (SEVERE)     Phenylpropanolamine Other (See Comments)     **Entex  HEADACHE (SEVERE)     Pseudoephedrine Tannate Other (See Comments)     **Entex  HEADACHE (SEVERE)     Levofloxacin Rash     **Levaquin     Moxifloxacin Hcl [Avelox] Rash     Current Outpatient Medications   Medication     acyclovir (ZOVIRAX) 400 MG tablet     acyclovir (ZOVIRAX) 400 MG tablet     atorvastatin (LIPITOR) 10 MG tablet     cyclophosphamide  "(CYTOXAN) 50 MG capsule     dexamethasone (DECADRON) 4 MG tablet     dexamethasone (DECADRON) 4 MG tablet     EPINEPHrine (EPIPEN) 0.3 MG/0.3ML injection     fluticasone (FLONASE) 50 MCG/ACT nasal spray     LORazepam (ATIVAN) 0.5 MG tablet     LORazepam (ATIVAN) 0.5 MG tablet     losartan (COZAAR) 50 MG tablet     ondansetron (ZOFRAN) 8 MG tablet     prochlorperazine (COMPAZINE) 10 MG tablet     prochlorperazine (COMPAZINE) 10 MG tablet     sertraline (ZOLOFT) 50 MG tablet     No current facility-administered medications for this visit.       ROS: 10 point ROS neg other than the symptoms noted above in the HPI.  /74   Pulse 80   Temp 97.9  F (36.6  C) (Tympanic)   Ht 1.6 m (5' 3\")   Wt 67.6 kg (149 lb)   SpO2 98%   BMI 26.39 kg/m      General - The patient is well nourished and well developed, and appears to have good nutritional status.  Alert and oriented to person and place, answers questions and cooperates with examination appropriately.   Head and Face - Normocephalic and atraumatic, with no gross asymmetry noted.  The facial nerve is intact, with strong symmetric movements.  No spontaneous nystagmus  Voice and Breathing - The patient was breathing comfortably without the use of accessory muscles. There was no wheezing, stridor, or stertor.  The patients voice was clear and strong, and had appropriate pitch and quality.  No blanka peripheral digital clubbing or cyanosis   Ears -examined under microscopy bilaterally  The external auditory canals are clear.  Bilateral tubes are in good position and patent. I removed crusting along bilateral tm and canal with cupped forceps and pick.   Mouth - Examination of the oral cavity showed pink, healthy oral mucosa. No lesions or ulcerations noted.  The tongue was mobile and midline, and the dentition were in good condition.    Throat - The walls of the oropharynx were smooth, pink, moist, symmetric, and had no lesions or ulcerations.  The tonsillar pillars and " soft palate were symmetric.  The uvula was midline on elevation.    Neck - No palpable enlarged fixed cervical lymph nodes.  No neck cysts or unusual tenderness to palpation.   No palpable fixed thyroid nodules or concerning goiter.  The trachea is grossly midline.   Nose - External contour is symmetric, no gross deflection or scars.  Nasal mucosa is pink and moist with no abnormal mucus.  The septum and turbinates were evaluated: non obstructive.  No polyps, masses, or purulence noted on examination.         ASSESSMENT:    ICD-10-CM    1. S/P myringotomy with insertion of tube Z96.22    2. ETD (Eustachian tube dysfunction), bilateral H69.83    3. Status post chemotherapy Z92.21        Ears look well  Removed crusting along TM/ tubes.     No fluid or infection present.   Return for hearing test on 11/7  If you develop moisture/ drainage/ blockage-contact nurse        Bridgette Ly PA-C  ENT  [facility], [place]  [phone]

## 2019-10-24 LAB
ALBUMIN SERPL ELPH-MCNC: 4 G/DL (ref 3.7–5.1)
ALPHA1 GLOB SERPL ELPH-MCNC: 0.3 G/DL (ref 0.2–0.4)
ALPHA2 GLOB SERPL ELPH-MCNC: 0.7 G/DL (ref 0.5–0.9)
B-GLOBULIN SERPL ELPH-MCNC: 0.6 G/DL (ref 0.6–1)
B2 MICROGLOB SERPL-MCNC: 3.2 MG/L
GAMMA GLOB SERPL ELPH-MCNC: 3.9 G/DL (ref 0.7–1.6)
M PROTEIN SERPL ELPH-MCNC: 3.8 G/DL
PROT PATTERN SERPL ELPH-IMP: ABNORMAL
VISC SER: 2.1 CP (ref 1.4–1.8)

## 2019-10-25 LAB
IGA SERPL-MCNC: 8 MG/DL (ref 70–380)
IGG SERPL-MCNC: 4510 MG/DL (ref 695–1620)
IGM SERPL-MCNC: 10 MG/DL (ref 60–265)
PROT PATTERN SERPL IFE-IMP: ABNORMAL

## 2019-10-29 ENCOUNTER — ONCOLOGY VISIT (OUTPATIENT)
Dept: ONCOLOGY | Facility: OTHER | Age: 69
End: 2019-10-29
Attending: INTERNAL MEDICINE
Payer: MEDICARE

## 2019-10-29 ENCOUNTER — INFUSION THERAPY VISIT (OUTPATIENT)
Dept: INFUSION THERAPY | Facility: OTHER | Age: 69
End: 2019-10-29
Attending: INTERNAL MEDICINE
Payer: MEDICARE

## 2019-10-29 ENCOUNTER — APPOINTMENT (OUTPATIENT)
Dept: LAB | Facility: OTHER | Age: 69
End: 2019-10-29
Attending: INTERNAL MEDICINE
Payer: MEDICARE

## 2019-10-29 VITALS
HEIGHT: 63 IN | RESPIRATION RATE: 18 BRPM | BODY MASS INDEX: 26.56 KG/M2 | SYSTOLIC BLOOD PRESSURE: 110 MMHG | TEMPERATURE: 99 F | DIASTOLIC BLOOD PRESSURE: 70 MMHG | HEART RATE: 90 BPM | WEIGHT: 149.91 LBS | OXYGEN SATURATION: 99 %

## 2019-10-29 VITALS
HEART RATE: 88 BPM | BODY MASS INDEX: 26.56 KG/M2 | DIASTOLIC BLOOD PRESSURE: 68 MMHG | RESPIRATION RATE: 18 BRPM | OXYGEN SATURATION: 97 % | HEIGHT: 63 IN | WEIGHT: 149.91 LBS | SYSTOLIC BLOOD PRESSURE: 115 MMHG

## 2019-10-29 DIAGNOSIS — C90.00 MULTIPLE MYELOMA NOT HAVING ACHIEVED REMISSION (H): ICD-10-CM

## 2019-10-29 DIAGNOSIS — C90.00 MULTIPLE MYELOMA NOT HAVING ACHIEVED REMISSION (H): Primary | ICD-10-CM

## 2019-10-29 LAB
ALBUMIN SERPL-MCNC: 3.1 G/DL (ref 3.4–5)
ALP SERPL-CCNC: 92 U/L (ref 40–150)
ALT SERPL W P-5'-P-CCNC: 17 U/L (ref 0–50)
ANION GAP SERPL CALCULATED.3IONS-SCNC: 3 MMOL/L (ref 3–14)
AST SERPL W P-5'-P-CCNC: 15 U/L (ref 0–45)
BASOPHILS # BLD AUTO: 0 10E9/L (ref 0–0.2)
BASOPHILS NFR BLD AUTO: 0.3 %
BILIRUB SERPL-MCNC: 0.3 MG/DL (ref 0.2–1.3)
BUN SERPL-MCNC: 15 MG/DL (ref 7–30)
CALCIUM SERPL-MCNC: 8.3 MG/DL (ref 8.5–10.1)
CHLORIDE SERPL-SCNC: 102 MMOL/L (ref 94–109)
CO2 SERPL-SCNC: 28 MMOL/L (ref 20–32)
CREAT SERPL-MCNC: 0.64 MG/DL (ref 0.52–1.04)
DIFFERENTIAL METHOD BLD: ABNORMAL
EOSINOPHIL # BLD AUTO: 0.2 10E9/L (ref 0–0.7)
EOSINOPHIL NFR BLD AUTO: 2.6 %
ERYTHROCYTE [DISTWIDTH] IN BLOOD BY AUTOMATED COUNT: 15.9 % (ref 10–15)
GFR SERPL CREATININE-BSD FRML MDRD: 90 ML/MIN/{1.73_M2}
GLUCOSE SERPL-MCNC: 96 MG/DL (ref 70–99)
HCT VFR BLD AUTO: 29.4 % (ref 35–47)
HGB BLD-MCNC: 9.9 G/DL (ref 11.7–15.7)
IMM GRANULOCYTES # BLD: 0.2 10E9/L (ref 0–0.4)
IMM GRANULOCYTES NFR BLD: 2.4 %
LYMPHOCYTES # BLD AUTO: 1.4 10E9/L (ref 0.8–5.3)
LYMPHOCYTES NFR BLD AUTO: 22.3 %
MCH RBC QN AUTO: 31.8 PG (ref 26.5–33)
MCHC RBC AUTO-ENTMCNC: 33.7 G/DL (ref 31.5–36.5)
MCV RBC AUTO: 95 FL (ref 78–100)
MONOCYTES # BLD AUTO: 1.2 10E9/L (ref 0–1.3)
MONOCYTES NFR BLD AUTO: 20 %
NEUTROPHILS # BLD AUTO: 3.2 10E9/L (ref 1.6–8.3)
NEUTROPHILS NFR BLD AUTO: 52.4 %
NRBC # BLD AUTO: 0 10*3/UL
NRBC BLD AUTO-RTO: 0 /100
PLATELET # BLD AUTO: 238 10E9/L (ref 150–450)
POTASSIUM SERPL-SCNC: 3.8 MMOL/L (ref 3.4–5.3)
PROT SERPL-MCNC: 9.7 G/DL (ref 6.8–8.8)
RBC # BLD AUTO: 3.11 10E12/L (ref 3.8–5.2)
SODIUM SERPL-SCNC: 133 MMOL/L (ref 133–144)
WBC # BLD AUTO: 6.1 10E9/L (ref 4–11)

## 2019-10-29 PROCEDURE — G0463 HOSPITAL OUTPT CLINIC VISIT: HCPCS | Mod: 25

## 2019-10-29 PROCEDURE — 25000128 H RX IP 250 OP 636: Performed by: INTERNAL MEDICINE

## 2019-10-29 PROCEDURE — 85025 COMPLETE CBC W/AUTO DIFF WBC: CPT | Mod: ZL | Performed by: INTERNAL MEDICINE

## 2019-10-29 PROCEDURE — G0463 HOSPITAL OUTPT CLINIC VISIT: HCPCS

## 2019-10-29 PROCEDURE — 96401 CHEMO ANTI-NEOPL SQ/IM: CPT

## 2019-10-29 PROCEDURE — 80053 COMPREHEN METABOLIC PANEL: CPT | Mod: ZL | Performed by: INTERNAL MEDICINE

## 2019-10-29 PROCEDURE — 99213 OFFICE O/P EST LOW 20 MIN: CPT | Performed by: INTERNAL MEDICINE

## 2019-10-29 PROCEDURE — 36415 COLL VENOUS BLD VENIPUNCTURE: CPT | Mod: ZL | Performed by: INTERNAL MEDICINE

## 2019-10-29 RX ORDER — NALOXONE HYDROCHLORIDE 0.4 MG/ML
.1-.4 INJECTION, SOLUTION INTRAMUSCULAR; INTRAVENOUS; SUBCUTANEOUS
Status: CANCELLED | OUTPATIENT
Start: 2019-11-12

## 2019-10-29 RX ORDER — NALOXONE HYDROCHLORIDE 0.4 MG/ML
.1-.4 INJECTION, SOLUTION INTRAMUSCULAR; INTRAVENOUS; SUBCUTANEOUS
Status: CANCELLED | OUTPATIENT
Start: 2019-10-29

## 2019-10-29 RX ORDER — SODIUM CHLORIDE 9 MG/ML
1000 INJECTION, SOLUTION INTRAVENOUS CONTINUOUS PRN
Status: CANCELLED
Start: 2019-11-19

## 2019-10-29 RX ORDER — ONDANSETRON 4 MG/1
8 TABLET, FILM COATED ORAL ONCE
Status: CANCELLED
Start: 2019-11-12

## 2019-10-29 RX ORDER — METHYLPREDNISOLONE SODIUM SUCCINATE 125 MG/2ML
125 INJECTION, POWDER, LYOPHILIZED, FOR SOLUTION INTRAMUSCULAR; INTRAVENOUS
Status: CANCELLED
Start: 2019-11-12

## 2019-10-29 RX ORDER — METHYLPREDNISOLONE SODIUM SUCCINATE 125 MG/2ML
125 INJECTION, POWDER, LYOPHILIZED, FOR SOLUTION INTRAMUSCULAR; INTRAVENOUS
Status: CANCELLED
Start: 2019-11-19

## 2019-10-29 RX ORDER — ALBUTEROL SULFATE 0.83 MG/ML
2.5 SOLUTION RESPIRATORY (INHALATION)
Status: CANCELLED | OUTPATIENT
Start: 2019-11-05

## 2019-10-29 RX ORDER — DIPHENHYDRAMINE HYDROCHLORIDE 50 MG/ML
50 INJECTION INTRAMUSCULAR; INTRAVENOUS
Status: CANCELLED
Start: 2019-10-29

## 2019-10-29 RX ORDER — METHYLPREDNISOLONE SODIUM SUCCINATE 125 MG/2ML
125 INJECTION, POWDER, LYOPHILIZED, FOR SOLUTION INTRAMUSCULAR; INTRAVENOUS
Status: CANCELLED
Start: 2019-10-29

## 2019-10-29 RX ORDER — ALBUTEROL SULFATE 0.83 MG/ML
2.5 SOLUTION RESPIRATORY (INHALATION)
Status: CANCELLED | OUTPATIENT
Start: 2019-11-12

## 2019-10-29 RX ORDER — ONDANSETRON 4 MG/1
8 TABLET, FILM COATED ORAL ONCE
Status: CANCELLED
Start: 2019-11-05

## 2019-10-29 RX ORDER — ONDANSETRON 4 MG/1
8 TABLET, FILM COATED ORAL ONCE
Status: CANCELLED
Start: 2019-11-19

## 2019-10-29 RX ORDER — ALBUTEROL SULFATE 90 UG/1
1-2 AEROSOL, METERED RESPIRATORY (INHALATION)
Status: CANCELLED
Start: 2019-11-12

## 2019-10-29 RX ORDER — NALOXONE HYDROCHLORIDE 0.4 MG/ML
.1-.4 INJECTION, SOLUTION INTRAMUSCULAR; INTRAVENOUS; SUBCUTANEOUS
Status: CANCELLED | OUTPATIENT
Start: 2019-11-05

## 2019-10-29 RX ORDER — NALOXONE HYDROCHLORIDE 0.4 MG/ML
.1-.4 INJECTION, SOLUTION INTRAMUSCULAR; INTRAVENOUS; SUBCUTANEOUS
Status: CANCELLED | OUTPATIENT
Start: 2019-11-19

## 2019-10-29 RX ORDER — LORAZEPAM 2 MG/ML
0.5 INJECTION INTRAMUSCULAR EVERY 4 HOURS PRN
Status: CANCELLED
Start: 2019-10-29

## 2019-10-29 RX ORDER — LORAZEPAM 2 MG/ML
0.5 INJECTION INTRAMUSCULAR EVERY 4 HOURS PRN
Status: CANCELLED
Start: 2019-11-19

## 2019-10-29 RX ORDER — ONDANSETRON 8 MG/1
8 TABLET, FILM COATED ORAL ONCE
Status: DISCONTINUED | OUTPATIENT
Start: 2019-10-29 | End: 2019-10-29 | Stop reason: HOSPADM

## 2019-10-29 RX ORDER — SODIUM CHLORIDE 9 MG/ML
1000 INJECTION, SOLUTION INTRAVENOUS CONTINUOUS PRN
Status: CANCELLED
Start: 2019-11-12

## 2019-10-29 RX ORDER — DIPHENHYDRAMINE HYDROCHLORIDE 50 MG/ML
50 INJECTION INTRAMUSCULAR; INTRAVENOUS
Status: CANCELLED
Start: 2019-11-05

## 2019-10-29 RX ORDER — EPINEPHRINE 0.3 MG/.3ML
0.3 INJECTION SUBCUTANEOUS EVERY 5 MIN PRN
Status: CANCELLED | OUTPATIENT
Start: 2019-10-29

## 2019-10-29 RX ORDER — EPINEPHRINE 1 MG/ML
0.3 INJECTION, SOLUTION, CONCENTRATE INTRAVENOUS EVERY 5 MIN PRN
Status: CANCELLED | OUTPATIENT
Start: 2019-11-05

## 2019-10-29 RX ORDER — EPINEPHRINE 1 MG/ML
0.3 INJECTION, SOLUTION, CONCENTRATE INTRAVENOUS EVERY 5 MIN PRN
Status: CANCELLED | OUTPATIENT
Start: 2019-10-29

## 2019-10-29 RX ORDER — SODIUM CHLORIDE 9 MG/ML
1000 INJECTION, SOLUTION INTRAVENOUS CONTINUOUS PRN
Status: CANCELLED
Start: 2019-10-29

## 2019-10-29 RX ORDER — EPINEPHRINE 0.3 MG/.3ML
0.3 INJECTION SUBCUTANEOUS EVERY 5 MIN PRN
Status: CANCELLED | OUTPATIENT
Start: 2019-11-12

## 2019-10-29 RX ORDER — LORAZEPAM 2 MG/ML
0.5 INJECTION INTRAMUSCULAR EVERY 4 HOURS PRN
Status: CANCELLED
Start: 2019-11-05

## 2019-10-29 RX ORDER — SODIUM CHLORIDE 9 MG/ML
1000 INJECTION, SOLUTION INTRAVENOUS CONTINUOUS PRN
Status: CANCELLED
Start: 2019-11-05

## 2019-10-29 RX ORDER — DEXAMETHASONE 4 MG/1
TABLET ORAL
Qty: 120 TABLET | Refills: 0 | Status: SHIPPED | OUTPATIENT
Start: 2019-10-29 | End: 2019-11-07

## 2019-10-29 RX ORDER — METHYLPREDNISOLONE SODIUM SUCCINATE 125 MG/2ML
125 INJECTION, POWDER, LYOPHILIZED, FOR SOLUTION INTRAMUSCULAR; INTRAVENOUS
Status: CANCELLED
Start: 2019-11-05

## 2019-10-29 RX ORDER — ALBUTEROL SULFATE 90 UG/1
1-2 AEROSOL, METERED RESPIRATORY (INHALATION)
Status: CANCELLED
Start: 2019-11-05

## 2019-10-29 RX ORDER — MEPERIDINE HYDROCHLORIDE 25 MG/ML
25 INJECTION INTRAMUSCULAR; INTRAVENOUS; SUBCUTANEOUS EVERY 30 MIN PRN
Status: CANCELLED | OUTPATIENT
Start: 2019-11-12

## 2019-10-29 RX ORDER — ONDANSETRON 8 MG/1
8 TABLET, FILM COATED ORAL EVERY 8 HOURS PRN
Qty: 45 TABLET | Refills: 3 | Status: SHIPPED | OUTPATIENT
Start: 2019-10-29 | End: 2019-11-07

## 2019-10-29 RX ORDER — ALBUTEROL SULFATE 90 UG/1
1-2 AEROSOL, METERED RESPIRATORY (INHALATION)
Status: CANCELLED
Start: 2019-10-29

## 2019-10-29 RX ORDER — LORAZEPAM 2 MG/ML
0.5 INJECTION INTRAMUSCULAR EVERY 4 HOURS PRN
Status: CANCELLED
Start: 2019-11-12

## 2019-10-29 RX ORDER — EPINEPHRINE 0.3 MG/.3ML
0.3 INJECTION SUBCUTANEOUS EVERY 5 MIN PRN
Status: CANCELLED | OUTPATIENT
Start: 2019-11-05

## 2019-10-29 RX ORDER — ALBUTEROL SULFATE 0.83 MG/ML
2.5 SOLUTION RESPIRATORY (INHALATION)
Status: CANCELLED | OUTPATIENT
Start: 2019-11-19

## 2019-10-29 RX ORDER — MEPERIDINE HYDROCHLORIDE 25 MG/ML
25 INJECTION INTRAMUSCULAR; INTRAVENOUS; SUBCUTANEOUS EVERY 30 MIN PRN
Status: CANCELLED | OUTPATIENT
Start: 2019-11-05

## 2019-10-29 RX ORDER — ALBUTEROL SULFATE 0.83 MG/ML
2.5 SOLUTION RESPIRATORY (INHALATION)
Status: CANCELLED | OUTPATIENT
Start: 2019-10-29

## 2019-10-29 RX ORDER — EPINEPHRINE 1 MG/ML
0.3 INJECTION, SOLUTION, CONCENTRATE INTRAVENOUS EVERY 5 MIN PRN
Status: CANCELLED | OUTPATIENT
Start: 2019-11-12

## 2019-10-29 RX ORDER — MEPERIDINE HYDROCHLORIDE 25 MG/ML
25 INJECTION INTRAMUSCULAR; INTRAVENOUS; SUBCUTANEOUS EVERY 30 MIN PRN
Status: CANCELLED | OUTPATIENT
Start: 2019-10-29

## 2019-10-29 RX ORDER — DIPHENHYDRAMINE HYDROCHLORIDE 50 MG/ML
50 INJECTION INTRAMUSCULAR; INTRAVENOUS
Status: CANCELLED
Start: 2019-11-19

## 2019-10-29 RX ORDER — MEPERIDINE HYDROCHLORIDE 25 MG/ML
25 INJECTION INTRAMUSCULAR; INTRAVENOUS; SUBCUTANEOUS EVERY 30 MIN PRN
Status: CANCELLED | OUTPATIENT
Start: 2019-11-19

## 2019-10-29 RX ORDER — EPINEPHRINE 1 MG/ML
0.3 INJECTION, SOLUTION, CONCENTRATE INTRAVENOUS EVERY 5 MIN PRN
Status: CANCELLED | OUTPATIENT
Start: 2019-11-19

## 2019-10-29 RX ORDER — ALBUTEROL SULFATE 90 UG/1
1-2 AEROSOL, METERED RESPIRATORY (INHALATION)
Status: CANCELLED
Start: 2019-11-19

## 2019-10-29 RX ORDER — EPINEPHRINE 0.3 MG/.3ML
0.3 INJECTION SUBCUTANEOUS EVERY 5 MIN PRN
Status: CANCELLED | OUTPATIENT
Start: 2019-11-19

## 2019-10-29 RX ORDER — DIPHENHYDRAMINE HYDROCHLORIDE 50 MG/ML
50 INJECTION INTRAMUSCULAR; INTRAVENOUS
Status: CANCELLED
Start: 2019-11-12

## 2019-10-29 RX ADMIN — BORTEZOMIB 2.6 MG: 3.5 INJECTION, POWDER, LYOPHILIZED, FOR SOLUTION INTRAVENOUS; SUBCUTANEOUS at 13:32

## 2019-10-29 ASSESSMENT — PATIENT HEALTH QUESTIONNAIRE - PHQ9: SUM OF ALL RESPONSES TO PHQ QUESTIONS 1-9: 1

## 2019-10-29 ASSESSMENT — MIFFLIN-ST. JEOR
SCORE: 1164.13
SCORE: 1164

## 2019-10-29 ASSESSMENT — PAIN SCALES - GENERAL: PAINLEVEL: NO PAIN (0)

## 2019-10-29 NOTE — NURSING NOTE
"Chief Complaint   Patient presents with     RECHECK     Follow up Multiple myeloma not having achieved remission        Initial /70   Pulse 90   Temp 99  F (37.2  C) (Tympanic)   Resp 18   Ht 1.6 m (5' 3\")   Wt 68 kg (149 lb 14.6 oz)   SpO2 99%   BMI 26.56 kg/m   Estimated body mass index is 26.56 kg/m  as calculated from the following:    Height as of this encounter: 1.6 m (5' 3\").    Weight as of this encounter: 68 kg (149 lb 14.6 oz).  Medication Reconciliation: complete.  Immunizations and advance directives status reviewed. Pain scale 0 , PHQ-9 =1.            Vilma Chi LPN    "

## 2019-10-29 NOTE — PROGRESS NOTES
Patient is a 69 year old female her today for injection of Velcade per order of . Patient meets parameters for today's infusion. Patient identified with two identifiers, order verified, and verbal consent for today's infusion obtained from patient. Written consent for treatment is on file and valid.    Recent lab values: WBC: 6.1, HGB: 9.9, PLT: 238  ANC: 3.2. Patient meets order parameters for today's treatment.    Independent dose check of velcade with Yancy Quintanilla RN prior to release of drug.    Patient denies questions or concerns regarding injection and/or medication being administered.    1332 Velcade injected SQ into right upper outer arm at 45 degree angle per protocol rotating sites.     Injection administered per protocol. Patient tolerated infusion without incident, no signs or symptoms of adverse reaction noted. Patient denies pain or discomfort.     Covered with a sterile bandage. Pt instructed to leave bandage intact for a minimum of one hour, and to call with questions or concerns. Patient declines copy of appointments, discharge instructions, and after visit summary (AVS). Patient states understanding, discharged ambulatory. Irene Booth RN

## 2019-10-30 NOTE — PROGRESS NOTES
Visit Date:   10/29/2019      HISTORY OF PRESENT ILLNESS:  Mrs. Watts returns for followup of IgA multiple myeloma.  For details of history, see previous notes.  She was last seen on 09/30/2019.  At that time, she was initiated on the CyBorD regimen after she had progressed on Velcade and Decadron.  The patient apparently had been seen by Dr. Ley, who felt the patient required myringotomy.  Myringotomy tube was placed, and she completed a course of antibiotics, and she did have hearing loss, which resolved itself after the myringotomy tube placement.  The patient was started on CyBorD regimen, which is Velcade given at a dose of 1.5 mg/m2 weekly on days 1, 8, 15 and 22 of a 28-day regimen and Cytoxan given at a dose of 140 mg/m2 weekly, or 200 mg weekly, on days 1, 8, 15 and 22.  She was receiving a reduced dose of Cytoxan based on her immunosuppressed state.  She was also continued on Zovirax prophylaxis, and dexamethasone was given 40 mg weekly on days 1, 8, 15 and 22. After 1 cycle, her M spike has dropped as well as her IgA level.  She comes in today.  She says she is doing relatively well.  She denies any neuropathy, abdominal pain, chest pain, fevers, night sweats, weight loss.      PHYSICAL EXAMINATION:   GENERAL:  She is an elderly white female in no acute distress.   VITAL SIGNS:  Reveal blood pressure 110/70, pulse 90, respirations 18, temperature 99.   HEENT:  Atraumatic, normocephalic.  Oropharynx nonerythematous.   NECK:  Supple.   LUNGS:  Clear to auscultation and percussion.   HEART:  Regular rhythm, S1, S2 normal.   ABDOMEN:  Soft.  Normoactive bowel sounds.  No mass, nontender.   LYMPHATICS:  No cervical, supraclavicular, axillary or inguinal nodes.   EXTREMITIES:  No edema.   NEUROLOGIC:  Nonfocal.      LABORATORY DATA: Reveals CBC:  White count 6.1, H and H 9.9 and 29.4, platelet count is 238,000.  IgA level was 4510.  Serum protein electrophoresis reveals an M-spike of 3.8 with monoclonal  IgA with a globulin kappa light chain type.  Serum viscosity is 2.1.  Ferritin is 654, iron 76; percent saturation is 28%.  Folate is 18.2.  LDH is 136.      IMPRESSION:  IgG kappa multiple myeloma.  Revised ISS staging is stage II with 80% involvement of the bone marrow, elevated beta-2 microglobulin, and elevated kappa lambda ratio [facility] standard.-  risk cytogenetics deemed a candidate for Velcade, Revlimid and Decadron.  She could not afford the Revlimid copay and started on Velcade and Decadron.  She refused stem cell transplant evaluation and consultation at the .  She received 2 cycles, with positive response with drop in M spike, but now with rising M spike, rising IgM kappa lambda ratio in the setting of a left otitis media as well as anemia.  Given the drop in hemoglobin as well as left otitis media, therapy was held, and she had myringotomy tubes placed.  Completed a course of antibiotics.  Otherwise, she was initiated on CyBorD regimen, and after 1 cycle, she had a positive response with drop in IgG level, drop in M spike.  The plan is to continue CyBorD regimen.  We will see the patient with cycle 3.      Forty minutes was spent with the patient, greater than half the time spent in counseling and coordination of care.         DORIE GUNN MD             D: 10/29/2019   T: 10/30/2019   MT: MELIA      Name:     WILLIAM WILDER   MRN:      [id number]        Account:      [id number]   :      1950           Visit Date:   10/29/2019      Document: [id number]       cc: Kenyatta Ley DO

## 2019-11-05 ENCOUNTER — APPOINTMENT (OUTPATIENT)
Dept: LAB | Facility: OTHER | Age: 69
End: 2019-11-05
Attending: INTERNAL MEDICINE
Payer: MEDICARE

## 2019-11-05 ENCOUNTER — INFUSION THERAPY VISIT (OUTPATIENT)
Dept: INFUSION THERAPY | Facility: OTHER | Age: 69
End: 2019-11-05
Attending: INTERNAL MEDICINE
Payer: MEDICARE

## 2019-11-05 VITALS
DIASTOLIC BLOOD PRESSURE: 74 MMHG | WEIGHT: 149.69 LBS | TEMPERATURE: 98.9 F | SYSTOLIC BLOOD PRESSURE: 118 MMHG | RESPIRATION RATE: 18 BRPM | HEIGHT: 63 IN | HEART RATE: 89 BPM | BODY MASS INDEX: 26.52 KG/M2 | OXYGEN SATURATION: 97 %

## 2019-11-05 DIAGNOSIS — C90.00 MULTIPLE MYELOMA NOT HAVING ACHIEVED REMISSION (H): Primary | ICD-10-CM

## 2019-11-05 LAB
BASOPHILS # BLD AUTO: 0 10E9/L (ref 0–0.2)
BASOPHILS NFR BLD AUTO: 0.2 %
DIFFERENTIAL METHOD BLD: ABNORMAL
EOSINOPHIL # BLD AUTO: 0.1 10E9/L (ref 0–0.7)
EOSINOPHIL NFR BLD AUTO: 1.8 %
ERYTHROCYTE [DISTWIDTH] IN BLOOD BY AUTOMATED COUNT: 15.5 % (ref 10–15)
HCT VFR BLD AUTO: 29.4 % (ref 35–47)
HGB BLD-MCNC: 9.7 G/DL (ref 11.7–15.7)
IMM GRANULOCYTES # BLD: 0.1 10E9/L (ref 0–0.4)
IMM GRANULOCYTES NFR BLD: 1.5 %
LYMPHOCYTES # BLD AUTO: 1.3 10E9/L (ref 0.8–5.3)
LYMPHOCYTES NFR BLD AUTO: 20.4 %
MCH RBC QN AUTO: 31.3 PG (ref 26.5–33)
MCHC RBC AUTO-ENTMCNC: 33 G/DL (ref 31.5–36.5)
MCV RBC AUTO: 95 FL (ref 78–100)
MONOCYTES # BLD AUTO: 1.2 10E9/L (ref 0–1.3)
MONOCYTES NFR BLD AUTO: 18.1 %
NEUTROPHILS # BLD AUTO: 3.8 10E9/L (ref 1.6–8.3)
NEUTROPHILS NFR BLD AUTO: 58 %
NRBC # BLD AUTO: 0 10*3/UL
NRBC BLD AUTO-RTO: 0 /100
PLATELET # BLD AUTO: 205 10E9/L (ref 150–450)
RBC # BLD AUTO: 3.1 10E12/L (ref 3.8–5.2)
WBC # BLD AUTO: 6.5 10E9/L (ref 4–11)

## 2019-11-05 PROCEDURE — 85025 COMPLETE CBC W/AUTO DIFF WBC: CPT | Mod: ZL | Performed by: INTERNAL MEDICINE

## 2019-11-05 PROCEDURE — 36415 COLL VENOUS BLD VENIPUNCTURE: CPT | Mod: ZL | Performed by: INTERNAL MEDICINE

## 2019-11-05 PROCEDURE — 96401 CHEMO ANTI-NEOPL SQ/IM: CPT

## 2019-11-05 PROCEDURE — 25000128 H RX IP 250 OP 636: Performed by: INTERNAL MEDICINE

## 2019-11-05 RX ORDER — ONDANSETRON 4 MG/1
8 TABLET, FILM COATED ORAL ONCE
Status: COMPLETED | OUTPATIENT
Start: 2019-11-05 | End: 2019-11-05

## 2019-11-05 RX ADMIN — BORTEZOMIB 2.6 MG: 3.5 INJECTION, POWDER, LYOPHILIZED, FOR SOLUTION INTRAVENOUS; SUBCUTANEOUS at 14:00

## 2019-11-05 RX ADMIN — ONDANSETRON HYDROCHLORIDE 8 MG: 4 TABLET, FILM COATED ORAL at 12:54

## 2019-11-05 ASSESSMENT — PAIN SCALES - GENERAL: PAINLEVEL: NO PAIN (0)

## 2019-11-05 ASSESSMENT — MIFFLIN-ST. JEOR: SCORE: 1163

## 2019-11-05 NOTE — PROGRESS NOTES
Patient is a 69 year old female here today for injection of Velcade per order of . Patient meets parameters for today's infusion. Patient identified with two identifiers, order verified, and verbal consent for today's infusion obtained from patient. Written consent for treatment is on file and valid.    Patient reports she had a galloping pulse 1 or 2 times night after Velcade last injection. Denies feeling in chest. Reports she had a rumbling in her tummy that made her check her pulse, and she noticed a bit of an extra beat around 10 counts. Notes no other sx. Reports she had a lot of caffeine that day and a big supper. Notes she has cut down on the caffeine and has not had since. She reports she is eating and drinking quite well, denies any other changes or concerns. VS WDL, as are labs today. No immediate concerns. Education on when to call or be seen with repeat or worsening sx. Does appear to be likely r/t indigestion. Unclear what she was feeling with her pulse but do not feel this today on exam of 1 full minute.     Today's lab values: WBC: 6.5, HGB: 9.7, PLT: 205  ANC: 3.8. Patient meets order parameters for today's treatment.    Patient denies questions or concerns regarding injection and/or medication(s) being administered.    Independent dose check Barron Booth RN.     Velcade injected SQ into left upper outer arm at 45 degree angle per protocol rotating sites (patient declines use of abd sites despite education re benefits of rotating there).     Injection administered per protocol. Patient tolerated infusion without incident, no signs or symptoms of adverse reaction noted. Patient denies pain or discomfort.     Covered with a sterile bandage. Pt instructed to leave bandage intact for a minimum of one hour, and to call with questions or concerns. Patient states understanding, discharged ambulatory.

## 2019-11-07 ENCOUNTER — OFFICE VISIT (OUTPATIENT)
Dept: OTOLARYNGOLOGY | Facility: OTHER | Age: 69
End: 2019-11-07
Attending: PHYSICIAN ASSISTANT
Payer: MEDICARE

## 2019-11-07 ENCOUNTER — OFFICE VISIT (OUTPATIENT)
Dept: AUDIOLOGY | Facility: OTHER | Age: 69
End: 2019-11-07
Attending: PHYSICIAN ASSISTANT
Payer: MEDICARE

## 2019-11-07 VITALS
BODY MASS INDEX: 25.87 KG/M2 | HEART RATE: 82 BPM | TEMPERATURE: 98.3 F | SYSTOLIC BLOOD PRESSURE: 120 MMHG | WEIGHT: 146 LBS | HEIGHT: 63 IN | OXYGEN SATURATION: 96 % | DIASTOLIC BLOOD PRESSURE: 76 MMHG

## 2019-11-07 DIAGNOSIS — H91.22 SUDDEN HEARING LOSS, LEFT: ICD-10-CM

## 2019-11-07 DIAGNOSIS — Z92.21 STATUS POST CHEMOTHERAPY: ICD-10-CM

## 2019-11-07 DIAGNOSIS — H69.93 ETD (EUSTACHIAN TUBE DYSFUNCTION), BILATERAL: ICD-10-CM

## 2019-11-07 DIAGNOSIS — Z96.22 S/P MYRINGOTOMY WITH INSERTION OF TUBE: Primary | ICD-10-CM

## 2019-11-07 DIAGNOSIS — H90.6 MIXED HEARING LOSS, BILATERAL: ICD-10-CM

## 2019-11-07 PROCEDURE — 92557 COMPREHENSIVE HEARING TEST: CPT | Performed by: AUDIOLOGIST

## 2019-11-07 PROCEDURE — 92567 TYMPANOMETRY: CPT | Performed by: AUDIOLOGIST

## 2019-11-07 PROCEDURE — G0463 HOSPITAL OUTPT CLINIC VISIT: HCPCS | Mod: 25

## 2019-11-07 PROCEDURE — 99213 OFFICE O/P EST LOW 20 MIN: CPT | Mod: 25 | Performed by: PHYSICIAN ASSISTANT

## 2019-11-07 PROCEDURE — 92504 EAR MICROSCOPY EXAMINATION: CPT

## 2019-11-07 PROCEDURE — 92504 EAR MICROSCOPY EXAMINATION: CPT | Performed by: PHYSICIAN ASSISTANT

## 2019-11-07 ASSESSMENT — PAIN SCALES - GENERAL: PAINLEVEL: NO PAIN (0)

## 2019-11-07 ASSESSMENT — MIFFLIN-ST. JEOR: SCORE: 1146.38

## 2019-11-07 NOTE — NURSING NOTE
"Chief Complaint   Patient presents with     RECHECK PE TUBES     Pt is here for a tube check.  BTT placed 9/20/19.       Initial /76 (BP Location: Right arm, Cuff Size: Adult Regular)   Pulse 82   Temp 98.3  F (36.8  C) (Tympanic)   Ht 1.6 m (5' 3\")   Wt 66.2 kg (146 lb)   SpO2 96%   BMI 25.86 kg/m   Estimated body mass index is 25.86 kg/m  as calculated from the following:    Height as of this encounter: 1.6 m (5' 3\").    Weight as of this encounter: 66.2 kg (146 lb).  Medication Reconciliation: complete  Ruby Loomis LPN    "

## 2019-11-07 NOTE — PATIENT INSTRUCTIONS
Ears look well.   Tubes are in good position and open.   If you develop ear fullness or drainage- contact nurse.     Annual hearing test.   Recheck ears in 6-12 months.     Continue with dennys med rinse, Flonase and Zyrtec.       Thank you for allowing Bridgette Ly PA-C and our ENT team to participate in your care.  If your medications are too expensive, please give the nurse a call.  We can possibly change this medication.  If you have a scheduling or an appointment question please contact our Health Unit Coordinator at their direct line [phone].   ALL nursing questions or concerns can be directed to your ENT nurse at: [phone] Ruby'

## 2019-11-07 NOTE — PROGRESS NOTES
Audiology Evaluation Completed. Please refer SCANNED AUDIOGRAM and/or TYMPANOGRAM for BACKGROUND, RESULTS, RECOMMENDATIONS.      Bia BULLOCK, [facility]  Audiologist #[id number]

## 2019-11-07 NOTE — PROGRESS NOTES
Chief Complaint   Patient presents with     RECHECK PE TUBES     Pt is here for a tube check.  BTT placed 9/20/19.     History of Present Illness - Carmelita Watts is a 69 year old female who is status post bilateral myringotomy tube placement on 9/20/19.  There were no issues post operatively, and the patient is back to a regular diet and normal daily activity.  There has been no drainage or bleeding from the ears, no fevers or chills.    Audiorgam-   Type B large volume.   Thresholds are mixed loss right moderate to mild to moderate severe. Left ear with moderate to severe SNHL.     Past Medical History:   Diagnosis Date     Arthritis      Depressive disorder      Essential hypertension 10/1/2015     Major depressive disorder, recurrent episode, mild (H) 10/1/2015     Mixed hyperlipidemia 10/1/2015     Multiple myeloma not having achieved remission (H) 6/24/2019     Other specified disorders of bladder 07/09/2012    Irritable Bladder     Seasonal allergies 10/1/2015     Unspecified essential hypertension 03/19/2007     Unspecified sinusitis (chronic) 09/05/2007        Allergies   Allergen Reactions     Lisinopril Cough     Phenylephrine Hcl Other (See Comments)     **Entex  HEADACHE (SEVERE)     Phenylpropanolamine Other (See Comments)     **Entex  HEADACHE (SEVERE)     Pseudoephedrine Tannate Other (See Comments)     **Entex  HEADACHE (SEVERE)     Levofloxacin Rash     **Levaquin     Moxifloxacin Hcl [Avelox] Rash     Current Outpatient Medications   Medication     acyclovir (ZOVIRAX) 400 MG tablet     atorvastatin (LIPITOR) 10 MG tablet     cyclophosphamide (CYTOXAN) 50 MG capsule     dexamethasone (DECADRON) 4 MG tablet     EPINEPHrine (EPIPEN) 0.3 MG/0.3ML injection     fluticasone (FLONASE) 50 MCG/ACT nasal spray     LORazepam (ATIVAN) 0.5 MG tablet     losartan (COZAAR) 50 MG tablet     ondansetron (ZOFRAN) 8 MG tablet     prochlorperazine (COMPAZINE) 10 MG tablet     sertraline (ZOLOFT) 50 MG tablet     No  "current facility-administered medications for this visit.       ROS: 10 point ROS neg other than the symptoms noted above in the HPI.  /76 (BP Location: Right arm, Cuff Size: Adult Regular)   Pulse 82   Temp 98.3  F (36.8  C) (Tympanic)   Ht 1.6 m (5' 3\")   Wt 66.2 kg (146 lb)   SpO2 96%   BMI 25.86 kg/m      General - The patient is well nourished and well developed, and appears to have good nutritional status.    Head and Face - Normocephalic and atraumatic, with no gross asymmetry noted of the contour of the facial features.  The facial nerve is intact, with strong symmetric movements.  Eyes - Extraocular movements intact, and the pupils were reactive to light.  Sclera were not icteric or injected, conjunctiva were pink and moist.  Mouth - Examination of the oral cavity shows pink, healthy, moist mucosa.  No lesions or ulceration noted.  The dentition are in good repair.  The tongue is mobile and midline.  Ears - Examination of the ears showed myringotomy tubes in good position bilaterally.  The tympanic membranes were gray and translucent.  No evidence of middle ear effusion, granulation tissue, or cholesteatoma.    Tympanometry - Pneumatic otoscopy was performed, both sides showed no movement of the tympanic membrane, consistent with open myringotomy tubes.    ASSESSMENT:    ICD-10-CM    1. S/P myringotomy with insertion of tube Z96.22    2. ETD (Eustachian tube dysfunction), bilateral H69.83    3. Status post chemotherapy Z92.21    4. Mixed hearing loss, bilateral H90.6      Ears look well.   Tubes are in good position and open.   If you develop ear fullness or drainage- contact nurse.     Annual hearing test.   Recheck ears in 6-12 months.     Continue with dennys med rinse, Flonase and Zyrtec.    Carmelita RODRIGUEZ Mac is status post bilateral myringotomy and tube placement.  No sign of complications at this point.  I have rediscussed water precautions, and will see the patient back in 6 months for a " routine tube check. I have also recommended the use of the post-op ear drops in the event of otorrhea during a URI.  If the drainage continues, however, they should come to me for earlier follow up.      Bridgette Ly PA-C  ENT  [facility], [place]  [phone]

## 2019-11-07 NOTE — LETTER
11/7/2019         RE: Carmelita Watts  [address] [facility] [address]        Dear Colleague,    Thank you for referring your patient, Carmelita Watts, to the [facility]. Please see a copy of my visit note below.    Chief Complaint   Patient presents with     RECHECK PE TUBES     Pt is here for a tube check.  BTT placed 9/20/19.     History of Present Illness - Carmelita Watts is a 69 year old female who is status post bilateral myringotomy tube placement on 9/20/19.  There were no issues post operatively, and the patient is back to a regular diet and normal daily activity.  There has been no drainage or bleeding from the ears, no fevers or chills.    Audiorgam-   Type B large volume.   Thresholds are mixed loss right moderate to mild to moderate severe. Left ear with moderate to severe SNHL.     Past Medical History:   Diagnosis Date     Arthritis      Depressive disorder      Essential hypertension 10/1/2015     Major depressive disorder, recurrent episode, mild (H) 10/1/2015     Mixed hyperlipidemia 10/1/2015     Multiple myeloma not having achieved remission (H) 6/24/2019     Other specified disorders of bladder 07/09/2012    Irritable Bladder     Seasonal allergies 10/1/2015     Unspecified essential hypertension 03/19/2007     Unspecified sinusitis (chronic) 09/05/2007        Allergies   Allergen Reactions     Lisinopril Cough     Phenylephrine Hcl Other (See Comments)     **Entex  HEADACHE (SEVERE)     Phenylpropanolamine Other (See Comments)     **Entex  HEADACHE (SEVERE)     Pseudoephedrine Tannate Other (See Comments)     **Entex  HEADACHE (SEVERE)     Levofloxacin Rash     **Levaquin     Moxifloxacin Hcl [Avelox] Rash     Current Outpatient Medications   Medication     acyclovir (ZOVIRAX) 400 MG tablet     atorvastatin (LIPITOR) 10 MG tablet     cyclophosphamide (CYTOXAN) 50 MG capsule     dexamethasone (DECADRON) 4 MG tablet     EPINEPHrine (EPIPEN) 0.3 MG/0.3ML injection      "fluticasone (FLONASE) 50 MCG/ACT nasal spray     LORazepam (ATIVAN) 0.5 MG tablet     losartan (COZAAR) 50 MG tablet     ondansetron (ZOFRAN) 8 MG tablet     prochlorperazine (COMPAZINE) 10 MG tablet     sertraline (ZOLOFT) 50 MG tablet     No current facility-administered medications for this visit.       ROS: 10 point ROS neg other than the symptoms noted above in the HPI.  /76 (BP Location: Right arm, Cuff Size: Adult Regular)   Pulse 82   Temp 98.3  F (36.8  C) (Tympanic)   Ht 1.6 m (5' 3\")   Wt 66.2 kg (146 lb)   SpO2 96%   BMI 25.86 kg/m       General - The patient is well nourished and well developed, and appears to have good nutritional status.    Head and Face - Normocephalic and atraumatic, with no gross asymmetry noted of the contour of the facial features.  The facial nerve is intact, with strong symmetric movements.  Eyes - Extraocular movements intact, and the pupils were reactive to light.  Sclera were not icteric or injected, conjunctiva were pink and moist.  Mouth - Examination of the oral cavity shows pink, healthy, moist mucosa.  No lesions or ulceration noted.  The dentition are in good repair.  The tongue is mobile and midline.  Ears - Examination of the ears showed myringotomy tubes in good position bilaterally.  The tympanic membranes were gray and translucent.  No evidence of middle ear effusion, granulation tissue, or cholesteatoma.    Tympanometry - Pneumatic otoscopy was performed, both sides showed no movement of the tympanic membrane, consistent with open myringotomy tubes.    ASSESSMENT:    ICD-10-CM    1. S/P myringotomy with insertion of tube Z96.22    2. ETD (Eustachian tube dysfunction), bilateral H69.83    3. Status post chemotherapy Z92.21    4. Mixed hearing loss, bilateral H90.6      Ears look well.   Tubes are in good position and open.   If you develop ear fullness or drainage- contact nurse.     Annual hearing test.   Recheck ears in 6-12 months.     Continue with " dennys med rinse, Flonase and Zyrtec.    Carmelita Watts is status post bilateral myringotomy and tube placement.  No sign of complications at this point.  I have rediscussed water precautions, and will see the patient back in 6 months for a routine tube check. I have also recommended the use of the post-op ear drops in the event of otorrhea during a URI.  If the drainage continues, however, they should come to me for earlier follow up.      Bridgette Ly PA-C  ENT  [facility], [place]  [phone]      Again, thank you for allowing me to participate in the care of your patient.        Sincerely,        Bridgette Ly PA-C

## 2019-11-12 ENCOUNTER — INFUSION THERAPY VISIT (OUTPATIENT)
Dept: INFUSION THERAPY | Facility: OTHER | Age: 69
End: 2019-11-12
Attending: INTERNAL MEDICINE
Payer: MEDICARE

## 2019-11-12 ENCOUNTER — APPOINTMENT (OUTPATIENT)
Dept: LAB | Facility: OTHER | Age: 69
End: 2019-11-12
Attending: INTERNAL MEDICINE
Payer: MEDICARE

## 2019-11-12 VITALS
WEIGHT: 151.24 LBS | RESPIRATION RATE: 16 BRPM | OXYGEN SATURATION: 97 % | BODY MASS INDEX: 26.8 KG/M2 | HEART RATE: 86 BPM | DIASTOLIC BLOOD PRESSURE: 72 MMHG | SYSTOLIC BLOOD PRESSURE: 119 MMHG | HEIGHT: 63 IN | TEMPERATURE: 98.5 F

## 2019-11-12 DIAGNOSIS — C90.00 MULTIPLE MYELOMA NOT HAVING ACHIEVED REMISSION (H): Primary | ICD-10-CM

## 2019-11-12 LAB
BASOPHILS # BLD AUTO: 0 10E9/L (ref 0–0.2)
BASOPHILS NFR BLD AUTO: 0.2 %
DIFFERENTIAL METHOD BLD: ABNORMAL
EOSINOPHIL # BLD AUTO: 0.1 10E9/L (ref 0–0.7)
EOSINOPHIL NFR BLD AUTO: 2.4 %
ERYTHROCYTE [DISTWIDTH] IN BLOOD BY AUTOMATED COUNT: 15.3 % (ref 10–15)
HCT VFR BLD AUTO: 29.5 % (ref 35–47)
HGB BLD-MCNC: 9.8 G/DL (ref 11.7–15.7)
IMM GRANULOCYTES # BLD: 0.1 10E9/L (ref 0–0.4)
IMM GRANULOCYTES NFR BLD: 2 %
LYMPHOCYTES # BLD AUTO: 1.2 10E9/L (ref 0.8–5.3)
LYMPHOCYTES NFR BLD AUTO: 23.3 %
MCH RBC QN AUTO: 31.5 PG (ref 26.5–33)
MCHC RBC AUTO-ENTMCNC: 33.2 G/DL (ref 31.5–36.5)
MCV RBC AUTO: 95 FL (ref 78–100)
MONOCYTES # BLD AUTO: 1 10E9/L (ref 0–1.3)
MONOCYTES NFR BLD AUTO: 18.8 %
NEUTROPHILS # BLD AUTO: 2.7 10E9/L (ref 1.6–8.3)
NEUTROPHILS NFR BLD AUTO: 53.3 %
NRBC # BLD AUTO: 0 10*3/UL
NRBC BLD AUTO-RTO: 0 /100
PLATELET # BLD AUTO: 209 10E9/L (ref 150–450)
RBC # BLD AUTO: 3.11 10E12/L (ref 3.8–5.2)
WBC # BLD AUTO: 5.1 10E9/L (ref 4–11)

## 2019-11-12 PROCEDURE — 25000128 H RX IP 250 OP 636: Performed by: INTERNAL MEDICINE

## 2019-11-12 PROCEDURE — 36415 COLL VENOUS BLD VENIPUNCTURE: CPT | Mod: ZL | Performed by: INTERNAL MEDICINE

## 2019-11-12 PROCEDURE — 85025 COMPLETE CBC W/AUTO DIFF WBC: CPT | Mod: ZL | Performed by: INTERNAL MEDICINE

## 2019-11-12 PROCEDURE — 96401 CHEMO ANTI-NEOPL SQ/IM: CPT

## 2019-11-12 RX ORDER — ONDANSETRON 4 MG/1
8 TABLET, FILM COATED ORAL ONCE
Status: COMPLETED | OUTPATIENT
Start: 2019-11-12 | End: 2019-11-12

## 2019-11-12 RX ADMIN — ONDANSETRON HYDROCHLORIDE 8 MG: 4 TABLET, FILM COATED ORAL at 11:16

## 2019-11-12 RX ADMIN — BORTEZOMIB 2.6 MG: 3.5 INJECTION, POWDER, LYOPHILIZED, FOR SOLUTION INTRAVENOUS; SUBCUTANEOUS at 12:02

## 2019-11-12 ASSESSMENT — MIFFLIN-ST. JEOR: SCORE: 1170.13

## 2019-11-12 ASSESSMENT — PAIN SCALES - GENERAL: PAINLEVEL: NO PAIN (0)

## 2019-11-12 NOTE — PROGRESS NOTES
"Subjective     Carmelita Watts is a 69 year old female who presents to clinic today for the following health issues:    HPI   Depression and Anxiety Follow-Up    How are you doing with your depression since your last visit? No change    How are you doing with your anxiety since your last visit?  No change    Are you having other symptoms that might be associated with depression or anxiety? Yes:  sometimes has a \"blah\" feeling     Have you had a significant life event? OTHER:  has pancreatic cancer, pt is being treated for multiple myeloma as well     Do you have any concerns with your use of alcohol or other drugs? No    Social History     Tobacco Use     Smoking status: Never Smoker     Smokeless tobacco: Never Used     Tobacco comment: Tried to Quit (YES); QUIT in 1971; Passive Exposure (NO)   Substance Use Topics     Alcohol use: Yes     Comment: RARELY     Drug use: No     PHQ 10/3/2019 10/29/2019 11/14/2019   PHQ-9 Total Score 3 1 3   Q9: Thoughts of better off dead/self-harm past 2 weeks Not at all Not at all Not at all     MINAL-7 SCORE 1/3/2019 10/3/2019 11/14/2019   Total Score 3 2 3     Last PHQ-9 11/14/2019   1.  Little interest or pleasure in doing things 1   2.  Feeling down, depressed, or hopeless 0   3.  Trouble falling or staying asleep, or sleeping too much 1   4.  Feeling tired or having little energy 1   5.  Poor appetite or overeating 0   6.  Feeling bad about yourself 0   7.  Trouble concentrating 0   8.  Moving slowly or restless 0   Q9: Thoughts of better off dead/self-harm past 2 weeks 0   PHQ-9 Total Score 3   Difficulty at work, home, or with people -     MINAL-7  11/14/2019   1. Feeling nervous, anxious, or on edge 1   2. Not being able to stop or control worrying 0   3. Worrying too much about different things 1   4. Trouble relaxing 0   5. Being so restless that it is hard to sit still 0   6. Becoming easily annoyed or irritable 1   7. Feeling afraid, as if something awful might " "happen 0   MINAL-7 Total Score 3   If you checked any problems, how difficult have they made it for you to do your work, take care of things at home, or get along with other people? -         Suicide Assessment Five-step Evaluation and Treatment (SAFE-T)      How many servings of fruits and vegetables do you eat daily?  2-3    On average, how many sweetened beverages do you drink each day (soda, juice, sweet tea, etc)?   0    How many days per week do you miss taking your medication? 0        Patient Active Problem List   Diagnosis     Hyperlipidemia LDL goal <130     Hypertension goal BP (blood pressure) < 140/80     Advanced care planning/counseling discussion     Major depressive disorder, recurrent episode, mild (H)     Seasonal allergies     Mixed hyperlipidemia     ACP (advance care planning)     Multiple myeloma not having achieved remission (H)     Past Surgical History:   Procedure Laterality Date     APPENDECTOMY       BONE MARROW BIOPSY, BONE SPECIMEN, NEEDLE/TROCAR N/A 6/18/2019    Procedure: BONE MARROW BIOPSY;  Surgeon: Maciej Sanz MD;  Location: [facility] OR     CHOLECYSTECTOMY       COLONOSCOPY  07-    repeat 10 years     COLONOSCOPY N/A 12/30/2016    Procedure: COLONOSCOPY;  Surgeon: Bhaskar Franklin DO;  Location: [facility] OR     SINUS SURGERY       TUBAL STERILIZATION         Social History     Tobacco Use     Smoking status: Never Smoker     Smokeless tobacco: Never Used     Tobacco comment: Tried to Quit (YES); QUIT in 1971; Passive Exposure (NO)   Substance Use Topics     Alcohol use: Yes     Comment: RARELY     Family History   Problem Relation Age of Onset     Breast Cancer Mother 66        Cause of Death     Parkinsonism Father         \"Possible\"     Coronary Artery Disease Father      Pacemaker Father      Thyroid Disease Daughter      Diabetes No family hx of      Hypertension No family hx of      Hyperlipidemia No family hx of      Cerebrovascular Disease No family hx of      Colon " Cancer No family hx of      Prostate Cancer No family hx of      Genetic Disorder No family hx of      Asthma No family hx of      Anesthesia Reaction No family hx of          Current Outpatient Medications   Medication Sig Dispense Refill     acyclovir (ZOVIRAX) 400 MG tablet Take 1 tablet (400 mg) by mouth 2 times daily Viral Prophylaxis. 60 tablet 5     atorvastatin (LIPITOR) 10 MG tablet Take 1 tablet (10 mg) by mouth daily 90 tablet 3     Bortezomib (VELCADE IJ)        cyclophosphamide (CYTOXAN) 50 MG capsule Take 4 capsules (200mg) by mouth every 7 days for 4 doses. Daily on Day 1,8,15 and 22. 16 capsule 3     dexamethasone (DECADRON) 4 MG tablet Take 10 tablets (40 mg) by mouth once a week On days 1, 8, 15. 30 tablet 3     EPINEPHrine (EPIPEN) 0.3 MG/0.3ML injection Inject 0.3 mg into the muscle once as needed. Use as needed for anaphylaxis.       fluticasone (FLONASE) 50 MCG/ACT nasal spray SPRAY 2 SPRAYS INTO BOTH NOSTRILS DAILY 48 mL 0     LORazepam (ATIVAN) 0.5 MG tablet Take 1 tablet (0.5 mg) by mouth every 4 hours as needed (Anxiety, Nausea/Vomiting or Sleep) 30 tablet 2     losartan (COZAAR) 50 MG tablet Take 1 tablet (50 mg) by mouth daily 90 tablet 3     ondansetron (ZOFRAN) 8 MG tablet Take 1 tablet (8 mg) by mouth every 8 hours as needed (Nausea/Vomiting) 10 tablet 2     prochlorperazine (COMPAZINE) 10 MG tablet Take 1 tablet (10 mg) by mouth every 6 hours as needed (Nausea/Vomiting) 30 tablet 3     sertraline (ZOLOFT) 50 MG tablet Take 1 tablet (50 mg) by mouth daily 30 tablet 3     Allergies   Allergen Reactions     Lisinopril Cough     Phenylephrine Hcl Other (See Comments)     **Entex  HEADACHE (SEVERE)     Phenylpropanolamine Other (See Comments)     **Entex  HEADACHE (SEVERE)     Pseudoephedrine Tannate Other (See Comments)     **Entex  HEADACHE (SEVERE)     Levofloxacin Rash     **Levaquin     Moxifloxacin Hcl [Avelox] Rash     Recent Labs   Lab Test 10/29/19  1038 10/22/19  6626  "09/30/19  1535  01/03/19  1043  11/17/17  0844 10/26/16  0921   LDL  --   --   --   --  86  --  108* 109*   HDL  --   --   --   --  42*  --  60 54   TRIG  --   --   --   --  126  --  78 115   ALT 17 21 15   < >  --    < > 27 21   CR 0.64 0.67 0.79   < >  --    < > 0.69 0.78   GFRESTIMATED 90 88 75   < >  --    < > 84 74   GFRESTBLACK >90 >90 87   < >  --    < > >90 90   POTASSIUM 3.8 3.3* 3.6   < >  --    < > 4.1 3.8   TSH  --   --   --   --   --   --  1.77 2.08    < > = values in this interval not displayed.      BP Readings from Last 3 Encounters:   11/14/19 122/82   11/12/19 119/72   11/07/19 120/76    Wt Readings from Last 3 Encounters:   11/14/19 68.5 kg (151 lb)   11/12/19 68.6 kg (151 lb 3.8 oz)   11/07/19 66.2 kg (146 lb)                      Reviewed and updated as needed this visit by Provider         Review of Systems   ROS COMP: Constitutional, HEENT, cardiovascular, pulmonary, gi and gu systems are negative, except as otherwise noted.      Objective    /82 (Patient Position: Sitting)   Pulse 83   Ht 1.6 m (5' 3\")   Wt 68.5 kg (151 lb)   SpO2 97%   BMI 26.75 kg/m    Body mass index is 26.75 kg/m .  Physical Exam   GENERAL: healthy, alert and no distress  EYES: Eyes grossly normal to inspection, PERRL and conjunctivae and sclerae normal  HENT: ear canals and TM's normal with improved hearing and bilateral PE tubes. , nose and mouth without ulcers or lesions  NECK: no adenopathy, no asymmetry, masses, or scars and thyroid normal to palpation  RESP: lungs clear to auscultation - no rales, rhonchi or wheezes  CV: regular rate and rhythm, normal S1 S2, no S3 or S4, no murmur, click or rub, no peripheral edema and peripheral pulses strong  MS: no gross musculoskeletal defects noted, no edema  PSYCH: mentation appears normal, affect normal/bright,feeling like herself today. Has good days and bad days but been able to down regulate.   LYMPH: no cervical, supraclavicular, axillary, or inguinal " "adenopathy    Diagnostic Test Results:  Labs reviewed in Epic  No results found for this or any previous visit (from the past 24 hour(s)).        Assessment & Plan     1. Mixed hyperlipidemia  She is due for labs. Can do this in January with her labs.   - Lipid Profile; Future  - TSH with free T4 reflex; Future  - CBC with platelets differential; Future    2. MINAL (generalized anxiety disorder)  Has been quite helpful full pill makes her tired. Taking 1/2   - sertraline (ZOLOFT) 50 MG tablet; Take 1 tablet (50 mg) by mouth daily  Dispense: 30 tablet; Refill: 3    3. Essential hypertension with goal blood pressure less than 140/90  Good blood pressure control. Feels almost normal.   - losartan (COZAAR) 50 MG tablet; Take 1 tablet (50 mg) by mouth daily  Dispense: 90 tablet; Refill: 3     BMI:   Estimated body mass index is 26.75 kg/m  as calculated from the following:    Height as of this encounter: 1.6 m (5' 3\").    Weight as of this encounter: 68.5 kg (151 lb).   Weight management plan: Discussed healthy diet and exercise guidelines        See Patient Instructions    No follow-ups on file.    VENKATESH Trevino  [facility] - [place]      "

## 2019-11-12 NOTE — PROGRESS NOTES
Patient is a 69 year old female here today for injection of Velcade per order of . Patient meets parameters for today's infusion. Patient identified with two identifiers, order verified, and verbal consent for today's infusion obtained from patient. Written consent for treatment is on file and valid.    Recent lab values: WBC: 5.1, HGB: 9.8, PLT: 209  ANC: 2.7. Patient meets order parameters for today's treatment.  Patient denies questions or concerns regarding injection and/or medication(s) being administered.  Velcade injected SQ into right upper outer arm  per protocol rotating sites.     Injection administered per protocol. Patient tolerated infusion without incident, no signs or symptoms of adverse reaction noted. Patient denies pain or discomfort.     Covered with a sterile bandage. Pt instructed to leave bandage intact for a minimum of one hour, and to call with questions or concerns. Copy of appointments, discharge instructions, and after visit summary (AVS) provided to patient. Patient states understanding, discharged ambulatory.

## 2019-11-13 ENCOUNTER — ANCILLARY PROCEDURE (OUTPATIENT)
Dept: MAMMOGRAPHY | Facility: OTHER | Age: 69
End: 2019-11-13
Attending: PHYSICIAN ASSISTANT
Payer: MEDICARE

## 2019-11-13 DIAGNOSIS — Z12.31 VISIT FOR SCREENING MAMMOGRAM: ICD-10-CM

## 2019-11-13 PROCEDURE — 77063 BREAST TOMOSYNTHESIS BI: CPT | Mod: TC

## 2019-11-14 ENCOUNTER — OFFICE VISIT (OUTPATIENT)
Dept: FAMILY MEDICINE | Facility: OTHER | Age: 69
End: 2019-11-14
Attending: PHYSICIAN ASSISTANT
Payer: COMMERCIAL

## 2019-11-14 VITALS
HEIGHT: 63 IN | BODY MASS INDEX: 26.75 KG/M2 | HEART RATE: 83 BPM | SYSTOLIC BLOOD PRESSURE: 122 MMHG | DIASTOLIC BLOOD PRESSURE: 82 MMHG | WEIGHT: 151 LBS | OXYGEN SATURATION: 97 %

## 2019-11-14 DIAGNOSIS — F41.1 GAD (GENERALIZED ANXIETY DISORDER): ICD-10-CM

## 2019-11-14 DIAGNOSIS — I10 ESSENTIAL HYPERTENSION WITH GOAL BLOOD PRESSURE LESS THAN 140/90: ICD-10-CM

## 2019-11-14 DIAGNOSIS — E78.2 MIXED HYPERLIPIDEMIA: Primary | ICD-10-CM

## 2019-11-14 PROCEDURE — 99214 OFFICE O/P EST MOD 30 MIN: CPT | Performed by: PHYSICIAN ASSISTANT

## 2019-11-14 PROCEDURE — G0463 HOSPITAL OUTPT CLINIC VISIT: HCPCS

## 2019-11-14 RX ORDER — LOSARTAN POTASSIUM 50 MG/1
50 TABLET ORAL DAILY
Qty: 90 TABLET | Refills: 3 | Status: SHIPPED | OUTPATIENT
Start: 2019-11-14 | End: 2020-02-14

## 2019-11-14 ASSESSMENT — ANXIETY QUESTIONNAIRES
6. BECOMING EASILY ANNOYED OR IRRITABLE: SEVERAL DAYS
3. WORRYING TOO MUCH ABOUT DIFFERENT THINGS: SEVERAL DAYS
5. BEING SO RESTLESS THAT IT IS HARD TO SIT STILL: NOT AT ALL
1. FEELING NERVOUS, ANXIOUS, OR ON EDGE: SEVERAL DAYS
GAD7 TOTAL SCORE: 3
4. TROUBLE RELAXING: NOT AT ALL
7. FEELING AFRAID AS IF SOMETHING AWFUL MIGHT HAPPEN: NOT AT ALL
2. NOT BEING ABLE TO STOP OR CONTROL WORRYING: NOT AT ALL

## 2019-11-14 ASSESSMENT — PATIENT HEALTH QUESTIONNAIRE - PHQ9: SUM OF ALL RESPONSES TO PHQ QUESTIONS 1-9: 3

## 2019-11-14 ASSESSMENT — MIFFLIN-ST. JEOR: SCORE: 1169.06

## 2019-11-14 ASSESSMENT — PAIN SCALES - GENERAL: PAINLEVEL: NO PAIN (0)

## 2019-11-14 NOTE — PATIENT INSTRUCTIONS
Thank you for choosing [facility].   I have office hours 8:00 am to 4:30 pm on Monday's, Wednesday's, Thursday's and Friday's. My nurse and I are out of the office every Tuesday.    Following your visit, when your labs and diagnostic testing have returned, I will review then and you will be contacted by my nurse.  If you are on My Chart, you can also view results there.    For refills, notify your pharmacy regarding what you need and the pharmacy will generate a refill request. Do not call my nurse as she is unable to process refill request. Please plan ahead and allow 3-5 days for refill requests.    You will generally receive a reminder call the day prior to your appointment.  If you cannot attend your appointment, please cancel your appointment with as much notice as possible.  If there is a pattern of failure to present for your appointments, I cannot provide consistent, meaningful, ongoing care for you. It is very important to me that you come in for your care, so we can best assist you with your health care needs.    IMPORTANT:  Please note that it is my standard of practice to NOT participate in prescribing ongoing requested Narcotic Analgesic therapy, and/or participate in the prescribing of other controlled substances.  My nurse and I am happy to assist you with the process of referral for alternative pain management as needed, and other treatment modalities including but not limited to:  Physical Therapy, Physical Medicine and Rehab, Counseling, Chiropractic Care, Orthopedic Care, and non-narcotic medication management.     In the event that you need to be seen for emergent concerns and I am out of office,  please see one of my colleagues for acute concerns.  You may also present to  or ER.  I appreciate the opportunity to serve you and look forward to supporting your healthcare needs in the future. Please contact me with any questions or concerns that you may  have.    Sincerely,      Kenyatta Trujillo RN, PA-C

## 2019-11-14 NOTE — NURSING NOTE
"Chief Complaint   Patient presents with     Depression     Anxiety       Initial /82 (Patient Position: Sitting)   Pulse 83   Ht 1.6 m (5' 3\")   Wt 68.5 kg (151 lb)   SpO2 97%   BMI 26.75 kg/m   Estimated body mass index is 26.75 kg/m  as calculated from the following:    Height as of this encounter: 1.6 m (5' 3\").    Weight as of this encounter: 68.5 kg (151 lb).  Medication Reconciliation: complete  Delilah Khanna LPN  "

## 2019-11-15 ASSESSMENT — ANXIETY QUESTIONNAIRES: GAD7 TOTAL SCORE: 3

## 2019-11-19 ENCOUNTER — INFUSION THERAPY VISIT (OUTPATIENT)
Dept: INFUSION THERAPY | Facility: OTHER | Age: 69
End: 2019-11-19
Attending: INTERNAL MEDICINE
Payer: MEDICARE

## 2019-11-19 ENCOUNTER — APPOINTMENT (OUTPATIENT)
Dept: LAB | Facility: OTHER | Age: 69
End: 2019-11-19
Attending: INTERNAL MEDICINE
Payer: MEDICARE

## 2019-11-19 VITALS
SYSTOLIC BLOOD PRESSURE: 108 MMHG | WEIGHT: 151.01 LBS | BODY MASS INDEX: 26.76 KG/M2 | HEART RATE: 80 BPM | HEIGHT: 63 IN | RESPIRATION RATE: 16 BRPM | OXYGEN SATURATION: 96 % | DIASTOLIC BLOOD PRESSURE: 60 MMHG | TEMPERATURE: 99.2 F

## 2019-11-19 DIAGNOSIS — C90.00 MULTIPLE MYELOMA NOT HAVING ACHIEVED REMISSION (H): Primary | ICD-10-CM

## 2019-11-19 LAB
BASOPHILS # BLD AUTO: 0 10E9/L (ref 0–0.2)
BASOPHILS NFR BLD AUTO: 0.4 %
DIFFERENTIAL METHOD BLD: ABNORMAL
EOSINOPHIL # BLD AUTO: 0.1 10E9/L (ref 0–0.7)
EOSINOPHIL NFR BLD AUTO: 2.5 %
ERYTHROCYTE [DISTWIDTH] IN BLOOD BY AUTOMATED COUNT: 15.4 % (ref 10–15)
HCT VFR BLD AUTO: 29.6 % (ref 35–47)
HGB BLD-MCNC: 10 G/DL (ref 11.7–15.7)
IMM GRANULOCYTES # BLD: 0.1 10E9/L (ref 0–0.4)
IMM GRANULOCYTES NFR BLD: 2.1 %
LYMPHOCYTES # BLD AUTO: 1.4 10E9/L (ref 0.8–5.3)
LYMPHOCYTES NFR BLD AUTO: 27.9 %
MCH RBC QN AUTO: 31.7 PG (ref 26.5–33)
MCHC RBC AUTO-ENTMCNC: 33.8 G/DL (ref 31.5–36.5)
MCV RBC AUTO: 94 FL (ref 78–100)
MONOCYTES # BLD AUTO: 1 10E9/L (ref 0–1.3)
MONOCYTES NFR BLD AUTO: 18.7 %
NEUTROPHILS # BLD AUTO: 2.5 10E9/L (ref 1.6–8.3)
NEUTROPHILS NFR BLD AUTO: 48.4 %
NRBC # BLD AUTO: 0 10*3/UL
NRBC BLD AUTO-RTO: 0 /100
PLATELET # BLD AUTO: 227 10E9/L (ref 150–450)
RBC # BLD AUTO: 3.15 10E12/L (ref 3.8–5.2)
WBC # BLD AUTO: 5.1 10E9/L (ref 4–11)

## 2019-11-19 PROCEDURE — 85025 COMPLETE CBC W/AUTO DIFF WBC: CPT | Mod: ZL | Performed by: INTERNAL MEDICINE

## 2019-11-19 PROCEDURE — 25000128 H RX IP 250 OP 636: Performed by: INTERNAL MEDICINE

## 2019-11-19 PROCEDURE — 96401 CHEMO ANTI-NEOPL SQ/IM: CPT

## 2019-11-19 PROCEDURE — 36415 COLL VENOUS BLD VENIPUNCTURE: CPT | Mod: ZL | Performed by: INTERNAL MEDICINE

## 2019-11-19 RX ORDER — ONDANSETRON 4 MG/1
8 TABLET, FILM COATED ORAL ONCE
Status: COMPLETED | OUTPATIENT
Start: 2019-11-19 | End: 2019-11-19

## 2019-11-19 RX ADMIN — BORTEZOMIB 2.6 MG: 3.5 INJECTION, POWDER, LYOPHILIZED, FOR SOLUTION INTRAVENOUS; SUBCUTANEOUS at 14:00

## 2019-11-19 RX ADMIN — ONDANSETRON HYDROCHLORIDE 8 MG: 4 TABLET, FILM COATED ORAL at 14:00

## 2019-11-19 ASSESSMENT — PAIN SCALES - GENERAL: PAINLEVEL: NO PAIN (0)

## 2019-11-19 ASSESSMENT — MIFFLIN-ST. JEOR: SCORE: 1169

## 2019-11-19 NOTE — PROGRESS NOTES
Patient is a 69 year old female her today for injection of Velcade per order of . Patient meets parameters for today's infusion. Patient identified with two identifiers, order verified, and verbal consent for today's infusion obtained from patient. Written consent for treatment is on file and valid.    Recent lab values: WBC: 5.1, HGB: 10.0, PLT: 227  ANC: 2.5. Patient meets order parameters for today's treatment.    Patient denies questions or concerns regarding injection and/or medication(s) being administered.    Independent dose check completed with Julia Anderson RN.    Velcade injected SQ into left upper outer arm at  45 degree angle per protocol rotating sites.     Injection administered per protocol. Patient tolerated infusion without incident, no signs or symptoms of adverse reaction noted. Patient denies pain or discomfort.     Covered with a sterile bandage. Pt instructed to leave bandage intact for a minimum of one hour, and to call with questions or concerns. Patient states understanding, discharged ambulatory.

## 2019-11-24 DIAGNOSIS — C90.00 MULTIPLE MYELOMA NOT HAVING ACHIEVED REMISSION (H): Primary | ICD-10-CM

## 2019-11-25 RX ORDER — ONDANSETRON 4 MG/1
8 TABLET, FILM COATED ORAL ONCE
Status: CANCELLED
Start: 2019-11-26

## 2019-11-25 NOTE — PROGRESS NOTES
Oncology Follow-up Visit:  November 24, 2019    Reason for Visit:  Patient presents with:  RECHECK: Follow up Multiple myeloma not having achieved remission      Nursing Note and documentation reviewed: yes    HPI:  This is a 69-year-old female patient who presents to the oncology clinic today for evaluation prior to receiving cycle 3 chemotherapy for Stage II-RISS IgG multiple myeloma diagnosed June 2019.  She progressed on Velcade and dexamethasone and is now receiving CyBorD.    She presents to the clinic stating that she is doing very well.  She feels like she is tolerating the chemotherapy.  She is had no issues with the oral Cytoxan since it was initiated.  She does notice she has an irregular heartbeat after treatment in the next day but denies any issues with dizziness or lightheadedness.  She does have bilateral tubes in her ears and states she does have kind of a muffling sound still but no pain and feels this is much better.  She does not want to have anything else done with her ears at this point.  She did recently have a change from Celexa to Zoloft for management of her depression.  She does have some increased fatigue the day after chemotherapy but otherwise feels she does very well from an energy standpoint.    Oncologic History:     12/31/2018   patient presented to the emergency room with vertigo and fatigue.  CT scan of the head was negative and subsequent stress test was negative.  5/3/2019  She was seen by her PCP who ordered lab work and noted a total protein of 12.9.  SPEP at that time showed an M spike of 6.2 with a large monoclonal protein seen in the gamma fraction.  Urine immunofixation showed a possible small protein band in the gamma fraction  5/31/2019  she was evaluated by Dr. Walker with Medical Oncology with plan to rule out myeloma and obtain bone marrow aspiration biopsy as well as a metastatic bone survey along with additional labwork  6/18/2019 she underwent bone marrow  aspiration and biopsy  6/24/2019  She was seen again by Dr. Walker and CBC showed a hemoglobin of 9.3, M spike 7.3 with monoclonal IgG immunoglobulin of kappa light chain type; serum viscosity was 2.9; quantitative immunoglobulins showed an IgG of 8160, beta-2 microglobulin was 5.8, BUN was 21 with creatinine is 0.8 and total protein was 13.7.  Quantitative kappa/lambda free light chains showed an elevated ratio of 17.0 bone marrow aspiration biopsy showed plasma cell myeloma with approximately 80% plasma cells.  Immunofixation showed IgG kappa and flow cytometry revealed kappa monotypic plasma cells consistent with clonal plasma cell neoplasm and FISH panel was pending at that time.  It was felt she had at least stage II disease based on her beta-2 microglobulin and anemia.  Plan was to treat with Velcade 1.3 mg per metered squared days 1, 4, 8 and 11/Decadron 40 mg on days 1, 8 and 15 initiation of Revlimid with the second cycle at 25 mg daily days 1 through 14.  Plan was to also obtain an MRI of the lumbar spine to rule out lytic lesion at L3.  She was initiated on Zovirax 400 mg p.o. twice daily.  6/25/2019  1st cycle of chemotherapy initiated  7/1/2019 note in chart regarding patient's large co-pay for the Revlimid and no plan at this point to initiate Revlimid and treat only with Velcade and Decadron per Dr. Walker  7/11/2019  MRI lumbar spine shows a pathologic superior endplate compression fracture at L3 without evidence of retropulsed fragment and innumerable enhancing lesions throughout the lumbar spine consistent with history of multiple myeloma.  9/10/2019  Increasing M-spike and kappa lambda ratio  10/1/2019  Initiation of CyBorD     Current Chemo Regime/TX:  Velcade 1.5mg.m2/cyclophosphamide 200mg every 7 days on days 1,8,15 and 22/decadron 40mg days 1,8,15,22       **Zometa 4mg every 3 months  Current Cycle: 3  # of completed cycles:  2     Previous treatment:   Velcade 1.3 mg per metered squared  "on days 1, 4, 8 and 11 with Decadron 40 mg on days 1, 8 and 15 x 4 cycles    Past Medical History:   Diagnosis Date     Arthritis      Depressive disorder      Essential hypertension 10/1/2015     Major depressive disorder, recurrent episode, mild (H) 10/1/2015     Mixed hyperlipidemia 10/1/2015     Multiple myeloma not having achieved remission (H) 6/24/2019     Other specified disorders of bladder 07/09/2012    Irritable Bladder     Seasonal allergies 10/1/2015     Unspecified essential hypertension 03/19/2007     Unspecified sinusitis (chronic) 09/05/2007       Past Surgical History:   Procedure Laterality Date     APPENDECTOMY       BONE MARROW BIOPSY, BONE SPECIMEN, NEEDLE/TROCAR N/A 6/18/2019    Procedure: BONE MARROW BIOPSY;  Surgeon: Maciej Sanz MD;  Location: [facility] OR     CHOLECYSTECTOMY       COLONOSCOPY  07-    repeat 10 years     COLONOSCOPY N/A 12/30/2016    Procedure: COLONOSCOPY;  Surgeon: Bhaskar Franklin DO;  Location: [facility]     SINUS SURGERY       TUBAL STERILIZATION         Family History   Problem Relation Age of Onset     Breast Cancer Mother 66        Cause of Death     Parkinsonism Father         \"Possible\"     Coronary Artery Disease Father      Pacemaker Father      Thyroid Disease Daughter      Diabetes No family hx of      Hypertension No family hx of      Hyperlipidemia No family hx of      Cerebrovascular Disease No family hx of      Colon Cancer No family hx of      Prostate Cancer No family hx of      Genetic Disorder No family hx of      Asthma No family hx of      Anesthesia Reaction No family hx of        Social History     Socioeconomic History     Marital status:      Spouse name: Not on file     Number of children: Not on file     Years of education: Not on file     Highest education level: Not on file   Occupational History     Occupation: Financial     Comment:  - (FT)   Social Needs     Financial resource strain: Not on file     Food " insecurity:     Worry: Not on file     Inability: Not on file     Transportation needs:     Medical: Not on file     Non-medical: Not on file   Tobacco Use     Smoking status: Never Smoker     Smokeless tobacco: Never Used     Tobacco comment: Tried to Quit (YES); QUIT in 1971; Passive Exposure (NO)   Substance and Sexual Activity     Alcohol use: Yes     Comment: RARELY     Drug use: No     Sexual activity: Yes     Partners: Male     Birth control/protection: None   Lifestyle     Physical activity:     Days per week: Not on file     Minutes per session: Not on file     Stress: Not on file   Relationships     Social connections:     Talks on phone: Not on file     Gets together: Not on file     Attends [employer] service: Not on file     Active member of club or organization: Not on file     Attends meetings of clubs or organizations: Not on file     Relationship status: Not on file     Intimate partner violence:     Fear of current or ex partner: Not on file     Emotionally abused: Not on file     Physically abused: Not on file     Forced sexual activity: Not on file   Other Topics Concern      Service Not Asked     Blood Transfusions Not Asked     Caffeine Concern Yes     Comment: Coffee >6 cups daily     Occupational Exposure Not Asked     Hobby Hazards Not Asked     Sleep Concern Not Asked     Stress Concern Not Asked     Weight Concern Not Asked     Special Diet Not Asked     Back Care Not Asked     Exercise Not Asked     Bike Helmet Not Asked     Seat Belt Not Asked     Self-Exams Not Asked     Parent/sibling w/ CABG, MI or angioplasty before 65F 55M? No   Social History Narrative     Not on file       Current Outpatient Medications   Medication     acyclovir (ZOVIRAX) 400 MG tablet     atorvastatin (LIPITOR) 10 MG tablet     Bortezomib (VELCADE IJ)     cyclophosphamide (CYTOXAN) 50 MG capsule     dexamethasone (DECADRON) 4 MG tablet     fluticasone (FLONASE) 50 MCG/ACT nasal spray     losartan (COZAAR)  "50 MG tablet     sertraline (ZOLOFT) 50 MG tablet     EPINEPHrine (EPIPEN) 0.3 MG/0.3ML injection     LORazepam (ATIVAN) 0.5 MG tablet     ondansetron (ZOFRAN) 8 MG tablet     prochlorperazine (COMPAZINE) 10 MG tablet     No current facility-administered medications for this visit.         Allergies   Allergen Reactions     Lisinopril Cough     Phenylephrine Hcl Other (See Comments)     **Entex  HEADACHE (SEVERE)     Phenylpropanolamine Other (See Comments)     **Entex  HEADACHE (SEVERE)     Pseudoephedrine Tannate Other (See Comments)     **Entex  HEADACHE (SEVERE)     Levofloxacin Rash     **Levaquin     Moxifloxacin Hcl [Avelox] Rash       Review Of Systems:  Constitutional:    denies fever, weight changes, chills, and night sweats.  Eyes:    denies blurred or double vision  Ears/Nose/Throat:   denies ear pain, nose problems, difficulty swallowing  Respiratory:   denies shortness of breath, cough  Skin:   denies rash, lesions  Cardiovascular:   denies chest pain, palpitations, edema  Gastrointestinal:   denies abdominal pain, bloating, nausea, vomiting, early satiety; no change in bowel habits or blood in stool  Genitourinary:   denies difficulty with urination, blood in urine  Musculoskeletal:    denies new muscle pain, bone pain  Neurologic:   denies lightheadedness, headaches, numbness or tingling  Psychiatric: See HPI  Hematologic/Lymphatic/Immunologic:   denies easy bruising, easy bleeding, lumps or bumps noted  Endocrine:   Denies increased thirst      Fatigue (0=no fatigue; 10=worst fatigue imaginable): 5 day after chemo and then 0    Pain  (0=no pain; 10=worst pain imaginable): 0    ECOG Performance Status: 1    Physical Exam:  /80   Pulse 91   Temp 98  F (36.7  C) (Tympanic)   Resp 18   Ht 1.6 m (5' 3\")   Wt 69 kg (152 lb 1.9 oz)   SpO2 98%   BMI 26.95 kg/m      GENERAL APPEARANCE: Healthy, alert and in no acute distress.  HEENT: Normocephalic, Sclerae anicteric. Oropharynx without ulcers, " lesions, or thrush.  Bilateral tubes in place.  NECK:   No asymmetry or masses, no thyromegaly.  LYMPHATICS: No palpable cervical, supraclavicular, axillary, or inguinal nodes   RESP: Lungs clear to auscultation bilaterally, respirations regular and easy  CARDIOVASCULAR: Regular rate and rhythm. Normal S1, S2; no murmur, gallop, or rub.  ABDOMEN: Soft, nontender. Bowel sounds auscultated all 4 quadrants. No palpable organomegaly or masses.  MUSCULOSKELETAL: Extremities without gross deformities noted. No edema of bilateral lower extremities.  NEURO: Alert and oriented x 3.  Gait steady.  PSYCHIATRIC: Mentation and affect appear normal.  Mood appropriate.    Laboratory:  Results for orders placed or performed in visit on 11/26/19   CBC with platelets differential     Status: Abnormal   Result Value Ref Range    WBC 5.5 4.0 - 11.0 10e9/L    RBC Count 3.47 (L) 3.8 - 5.2 10e12/L    Hemoglobin 10.8 (L) 11.7 - 15.7 g/dL    Hematocrit 32.1 (L) 35.0 - 47.0 %    MCV 93 78 - 100 fl    MCH 31.1 26.5 - 33.0 pg    MCHC 33.6 31.5 - 36.5 g/dL    RDW 15.3 (H) 10.0 - 15.0 %    Platelet Count 229 150 - 450 10e9/L    Diff Method Automated Method     % Neutrophils 52.4 %    % Lymphocytes 23.6 %    % Monocytes 17.0 %    % Eosinophils 2.0 %    % Basophils 0.5 %    % Immature Granulocytes 4.5 %    Nucleated RBCs 0 0 /100    Absolute Neutrophil 2.9 1.6 - 8.3 10e9/L    Absolute Lymphocytes 1.3 0.8 - 5.3 10e9/L    Absolute Monocytes 0.9 0.0 - 1.3 10e9/L    Absolute Eosinophils 0.1 0.0 - 0.7 10e9/L    Absolute Basophils 0.0 0.0 - 0.2 10e9/L    Abs Immature Granulocytes 0.3 0 - 0.4 10e9/L    Absolute Nucleated RBC 0.0    Comprehensive metabolic panel     Status: Abnormal   Result Value Ref Range    Sodium 134 133 - 144 mmol/L    Potassium 3.9 3.4 - 5.3 mmol/L    Chloride 105 94 - 109 mmol/L    Carbon Dioxide 27 20 - 32 mmol/L    Anion Gap 2 (L) 3 - 14 mmol/L    Glucose 91 70 - 99 mg/dL    Urea Nitrogen 17 7 - 30 mg/dL    Creatinine 0.63 0.52 -  1.04 mg/dL    GFR Estimate >90 >60 mL/min/[1.73_m2]    GFR Estimate If Black >90 >60 mL/min/[1.73_m2]    Calcium 8.7 8.5 - 10.1 mg/dL    Bilirubin Total 0.3 0.2 - 1.3 mg/dL    Albumin 3.1 (L) 3.4 - 5.0 g/dL    Protein Total 9.6 (H) 6.8 - 8.8 g/dL    Alkaline Phosphatase 108 40 - 150 U/L    ALT 70 (H) 0 - 50 U/L    AST 51 (H) 0 - 45 U/L       Imaging Studies:  None completed for today's visit      ASSESSMENT/PLAN:    #1 Multiple myeloma:   IgG kappa multiple myeloma with 80% involvement of the bone marrow diagnosed June 2019 initially treated with Velcade and Decadron and received 4 cycles with increasing M-spike and kappa/lambda ratio.  Treatment changed to CyBorD on 10/1/2019.  No ELP or kappa lambda ratio for my review today.  These were drawn today.  She will initiate cycle 3 today and will go forth with the Velcade and Decadron and she does have her prescription for the Cytoxan 200 mg that she will take on days 1, 815 and 22.  She will follow up prior to cycle 4 with myeloma labs.     #2 myeloma lytic lesion:  Will be due for Zometa again in [facility].    #3 elevated liver functions: Will obtain a hepatic panel weekly throughout this treatment to monitor her liver functions.  No change in dosing at this time.    I encouraged patient to call with any questions or concerns.       Barbie Quintana, NP  APRN, FNP-BC, AOCNP

## 2019-11-26 ENCOUNTER — INFUSION THERAPY VISIT (OUTPATIENT)
Dept: INFUSION THERAPY | Facility: OTHER | Age: 69
End: 2019-11-26
Attending: INTERNAL MEDICINE
Payer: MEDICARE

## 2019-11-26 ENCOUNTER — ONCOLOGY VISIT (OUTPATIENT)
Dept: ONCOLOGY | Facility: OTHER | Age: 69
End: 2019-11-26
Attending: INTERNAL MEDICINE
Payer: MEDICARE

## 2019-11-26 VITALS
DIASTOLIC BLOOD PRESSURE: 80 MMHG | WEIGHT: 152.12 LBS | SYSTOLIC BLOOD PRESSURE: 110 MMHG | HEART RATE: 91 BPM | RESPIRATION RATE: 18 BRPM | TEMPERATURE: 98 F | HEIGHT: 63 IN | BODY MASS INDEX: 26.95 KG/M2 | OXYGEN SATURATION: 98 %

## 2019-11-26 VITALS
HEART RATE: 82 BPM | HEIGHT: 63 IN | TEMPERATURE: 98 F | SYSTOLIC BLOOD PRESSURE: 122 MMHG | WEIGHT: 152.12 LBS | BODY MASS INDEX: 26.95 KG/M2 | OXYGEN SATURATION: 98 % | RESPIRATION RATE: 18 BRPM | DIASTOLIC BLOOD PRESSURE: 77 MMHG

## 2019-11-26 DIAGNOSIS — R79.89 ELEVATED LFTS: ICD-10-CM

## 2019-11-26 DIAGNOSIS — C90.00 MULTIPLE MYELOMA NOT HAVING ACHIEVED REMISSION (H): Primary | ICD-10-CM

## 2019-11-26 DIAGNOSIS — M89.9 BONE LESION: ICD-10-CM

## 2019-11-26 DIAGNOSIS — C90.00 MULTIPLE MYELOMA NOT HAVING ACHIEVED REMISSION (H): ICD-10-CM

## 2019-11-26 LAB
ALBUMIN SERPL-MCNC: 3.1 G/DL (ref 3.4–5)
ALP SERPL-CCNC: 108 U/L (ref 40–150)
ALT SERPL W P-5'-P-CCNC: 70 U/L (ref 0–50)
ANION GAP SERPL CALCULATED.3IONS-SCNC: 2 MMOL/L (ref 3–14)
AST SERPL W P-5'-P-CCNC: 51 U/L (ref 0–45)
BASOPHILS # BLD AUTO: 0 10E9/L (ref 0–0.2)
BASOPHILS NFR BLD AUTO: 0.5 %
BILIRUB SERPL-MCNC: 0.3 MG/DL (ref 0.2–1.3)
BUN SERPL-MCNC: 17 MG/DL (ref 7–30)
CALCIUM SERPL-MCNC: 8.7 MG/DL (ref 8.5–10.1)
CHLORIDE SERPL-SCNC: 105 MMOL/L (ref 94–109)
CO2 SERPL-SCNC: 27 MMOL/L (ref 20–32)
CREAT SERPL-MCNC: 0.63 MG/DL (ref 0.52–1.04)
DIFFERENTIAL METHOD BLD: ABNORMAL
EOSINOPHIL # BLD AUTO: 0.1 10E9/L (ref 0–0.7)
EOSINOPHIL NFR BLD AUTO: 2 %
ERYTHROCYTE [DISTWIDTH] IN BLOOD BY AUTOMATED COUNT: 15.3 % (ref 10–15)
GFR SERPL CREATININE-BSD FRML MDRD: >90 ML/MIN/{1.73_M2}
GLUCOSE SERPL-MCNC: 91 MG/DL (ref 70–99)
HCT VFR BLD AUTO: 32.1 % (ref 35–47)
HGB BLD-MCNC: 10.8 G/DL (ref 11.7–15.7)
IMM GRANULOCYTES # BLD: 0.3 10E9/L (ref 0–0.4)
IMM GRANULOCYTES NFR BLD: 4.5 %
LYMPHOCYTES # BLD AUTO: 1.3 10E9/L (ref 0.8–5.3)
LYMPHOCYTES NFR BLD AUTO: 23.6 %
MCH RBC QN AUTO: 31.1 PG (ref 26.5–33)
MCHC RBC AUTO-ENTMCNC: 33.6 G/DL (ref 31.5–36.5)
MCV RBC AUTO: 93 FL (ref 78–100)
MONOCYTES # BLD AUTO: 0.9 10E9/L (ref 0–1.3)
MONOCYTES NFR BLD AUTO: 17 %
NEUTROPHILS # BLD AUTO: 2.9 10E9/L (ref 1.6–8.3)
NEUTROPHILS NFR BLD AUTO: 52.4 %
NRBC # BLD AUTO: 0 10*3/UL
NRBC BLD AUTO-RTO: 0 /100
PLATELET # BLD AUTO: 229 10E9/L (ref 150–450)
POTASSIUM SERPL-SCNC: 3.9 MMOL/L (ref 3.4–5.3)
PROT SERPL-MCNC: 9.6 G/DL (ref 6.8–8.8)
RBC # BLD AUTO: 3.47 10E12/L (ref 3.8–5.2)
SODIUM SERPL-SCNC: 134 MMOL/L (ref 133–144)
WBC # BLD AUTO: 5.5 10E9/L (ref 4–11)

## 2019-11-26 PROCEDURE — 83883 ASSAY NEPHELOMETRY NOT SPEC: CPT | Mod: ZL | Performed by: NURSE PRACTITIONER

## 2019-11-26 PROCEDURE — 80053 COMPREHEN METABOLIC PANEL: CPT | Mod: ZL | Performed by: INTERNAL MEDICINE

## 2019-11-26 PROCEDURE — 82784 ASSAY IGA/IGD/IGG/IGM EACH: CPT | Mod: ZL | Performed by: NURSE PRACTITIONER

## 2019-11-26 PROCEDURE — 36415 COLL VENOUS BLD VENIPUNCTURE: CPT | Mod: ZL | Performed by: NURSE PRACTITIONER

## 2019-11-26 PROCEDURE — 85025 COMPLETE CBC W/AUTO DIFF WBC: CPT | Mod: ZL | Performed by: INTERNAL MEDICINE

## 2019-11-26 PROCEDURE — G0463 HOSPITAL OUTPT CLINIC VISIT: HCPCS | Mod: 25

## 2019-11-26 PROCEDURE — G0463 HOSPITAL OUTPT CLINIC VISIT: HCPCS

## 2019-11-26 PROCEDURE — 84165 PROTEIN E-PHORESIS SERUM: CPT | Mod: ZL | Performed by: NURSE PRACTITIONER

## 2019-11-26 PROCEDURE — 00000402 ZZHCL STATISTIC TOTAL PROTEIN: Mod: ZL | Performed by: NURSE PRACTITIONER

## 2019-11-26 PROCEDURE — 96401 CHEMO ANTI-NEOPL SQ/IM: CPT

## 2019-11-26 PROCEDURE — 86334 IMMUNOFIX E-PHORESIS SERUM: CPT | Mod: ZL | Performed by: NURSE PRACTITIONER

## 2019-11-26 PROCEDURE — 25000128 H RX IP 250 OP 636: Performed by: NURSE PRACTITIONER

## 2019-11-26 PROCEDURE — 99214 OFFICE O/P EST MOD 30 MIN: CPT | Performed by: NURSE PRACTITIONER

## 2019-11-26 RX ORDER — NALOXONE HYDROCHLORIDE 0.4 MG/ML
.1-.4 INJECTION, SOLUTION INTRAMUSCULAR; INTRAVENOUS; SUBCUTANEOUS
Status: CANCELLED | OUTPATIENT
Start: 2019-12-17

## 2019-11-26 RX ORDER — NALOXONE HYDROCHLORIDE 0.4 MG/ML
.1-.4 INJECTION, SOLUTION INTRAMUSCULAR; INTRAVENOUS; SUBCUTANEOUS
Status: CANCELLED | OUTPATIENT
Start: 2019-12-10

## 2019-11-26 RX ORDER — ALBUTEROL SULFATE 90 UG/1
1-2 AEROSOL, METERED RESPIRATORY (INHALATION)
Status: CANCELLED
Start: 2019-12-03

## 2019-11-26 RX ORDER — METHYLPREDNISOLONE SODIUM SUCCINATE 125 MG/2ML
125 INJECTION, POWDER, LYOPHILIZED, FOR SOLUTION INTRAMUSCULAR; INTRAVENOUS
Status: CANCELLED
Start: 2019-11-26

## 2019-11-26 RX ORDER — SODIUM CHLORIDE 9 MG/ML
1000 INJECTION, SOLUTION INTRAVENOUS CONTINUOUS PRN
Status: CANCELLED
Start: 2019-11-26

## 2019-11-26 RX ORDER — ONDANSETRON 8 MG/1
8 TABLET, FILM COATED ORAL ONCE
Status: CANCELLED
Start: 2019-12-17

## 2019-11-26 RX ORDER — ONDANSETRON 8 MG/1
8 TABLET, FILM COATED ORAL ONCE
Status: CANCELLED
Start: 2019-12-03

## 2019-11-26 RX ORDER — NALOXONE HYDROCHLORIDE 0.4 MG/ML
.1-.4 INJECTION, SOLUTION INTRAMUSCULAR; INTRAVENOUS; SUBCUTANEOUS
Status: CANCELLED | OUTPATIENT
Start: 2019-11-26

## 2019-11-26 RX ORDER — EPINEPHRINE 1 MG/ML
0.3 INJECTION, SOLUTION, CONCENTRATE INTRAVENOUS EVERY 5 MIN PRN
Status: CANCELLED | OUTPATIENT
Start: 2019-12-10

## 2019-11-26 RX ORDER — NALOXONE HYDROCHLORIDE 0.4 MG/ML
.1-.4 INJECTION, SOLUTION INTRAMUSCULAR; INTRAVENOUS; SUBCUTANEOUS
Status: CANCELLED | OUTPATIENT
Start: 2019-12-03

## 2019-11-26 RX ORDER — EPINEPHRINE 0.3 MG/.3ML
0.3 INJECTION SUBCUTANEOUS EVERY 5 MIN PRN
Status: CANCELLED | OUTPATIENT
Start: 2019-11-26

## 2019-11-26 RX ORDER — METHYLPREDNISOLONE SODIUM SUCCINATE 125 MG/2ML
125 INJECTION, POWDER, LYOPHILIZED, FOR SOLUTION INTRAMUSCULAR; INTRAVENOUS
Status: CANCELLED
Start: 2019-12-03

## 2019-11-26 RX ORDER — ALBUTEROL SULFATE 0.83 MG/ML
2.5 SOLUTION RESPIRATORY (INHALATION)
Status: CANCELLED | OUTPATIENT
Start: 2019-12-10

## 2019-11-26 RX ORDER — ALBUTEROL SULFATE 90 UG/1
1-2 AEROSOL, METERED RESPIRATORY (INHALATION)
Status: CANCELLED
Start: 2019-12-17

## 2019-11-26 RX ORDER — MEPERIDINE HYDROCHLORIDE 25 MG/ML
25 INJECTION INTRAMUSCULAR; INTRAVENOUS; SUBCUTANEOUS EVERY 30 MIN PRN
Status: CANCELLED | OUTPATIENT
Start: 2019-12-03

## 2019-11-26 RX ORDER — ALBUTEROL SULFATE 0.83 MG/ML
2.5 SOLUTION RESPIRATORY (INHALATION)
Status: CANCELLED | OUTPATIENT
Start: 2019-11-26

## 2019-11-26 RX ORDER — EPINEPHRINE 1 MG/ML
0.3 INJECTION, SOLUTION, CONCENTRATE INTRAVENOUS EVERY 5 MIN PRN
Status: CANCELLED | OUTPATIENT
Start: 2019-11-26

## 2019-11-26 RX ORDER — ONDANSETRON 4 MG/1
8 TABLET, FILM COATED ORAL ONCE
Status: COMPLETED | OUTPATIENT
Start: 2019-11-26 | End: 2019-11-26

## 2019-11-26 RX ORDER — LORAZEPAM 2 MG/ML
0.5 INJECTION INTRAMUSCULAR EVERY 4 HOURS PRN
Status: CANCELLED
Start: 2019-12-10

## 2019-11-26 RX ORDER — SODIUM CHLORIDE 9 MG/ML
1000 INJECTION, SOLUTION INTRAVENOUS CONTINUOUS PRN
Status: CANCELLED
Start: 2019-12-17

## 2019-11-26 RX ORDER — LORAZEPAM 2 MG/ML
0.5 INJECTION INTRAMUSCULAR EVERY 4 HOURS PRN
Status: CANCELLED
Start: 2019-12-03

## 2019-11-26 RX ORDER — EPINEPHRINE 0.3 MG/.3ML
0.3 INJECTION SUBCUTANEOUS EVERY 5 MIN PRN
Status: CANCELLED | OUTPATIENT
Start: 2019-12-10

## 2019-11-26 RX ORDER — MEPERIDINE HYDROCHLORIDE 25 MG/ML
25 INJECTION INTRAMUSCULAR; INTRAVENOUS; SUBCUTANEOUS EVERY 30 MIN PRN
Status: CANCELLED | OUTPATIENT
Start: 2019-11-26

## 2019-11-26 RX ORDER — EPINEPHRINE 0.3 MG/.3ML
0.3 INJECTION SUBCUTANEOUS EVERY 5 MIN PRN
Status: CANCELLED | OUTPATIENT
Start: 2019-12-17

## 2019-11-26 RX ORDER — DIPHENHYDRAMINE HYDROCHLORIDE 50 MG/ML
50 INJECTION INTRAMUSCULAR; INTRAVENOUS
Status: CANCELLED
Start: 2019-12-03

## 2019-11-26 RX ORDER — ONDANSETRON 4 MG/1
8 TABLET, FILM COATED ORAL ONCE
Status: CANCELLED
Start: 2019-12-10

## 2019-11-26 RX ORDER — EPINEPHRINE 0.3 MG/.3ML
0.3 INJECTION SUBCUTANEOUS EVERY 5 MIN PRN
Status: CANCELLED | OUTPATIENT
Start: 2019-12-03

## 2019-11-26 RX ORDER — METHYLPREDNISOLONE SODIUM SUCCINATE 125 MG/2ML
125 INJECTION, POWDER, LYOPHILIZED, FOR SOLUTION INTRAMUSCULAR; INTRAVENOUS
Status: CANCELLED
Start: 2019-12-17

## 2019-11-26 RX ORDER — DIPHENHYDRAMINE HYDROCHLORIDE 50 MG/ML
50 INJECTION INTRAMUSCULAR; INTRAVENOUS
Status: CANCELLED
Start: 2019-11-26

## 2019-11-26 RX ORDER — ALBUTEROL SULFATE 90 UG/1
1-2 AEROSOL, METERED RESPIRATORY (INHALATION)
Status: CANCELLED
Start: 2019-11-26

## 2019-11-26 RX ORDER — LORAZEPAM 2 MG/ML
0.5 INJECTION INTRAMUSCULAR EVERY 4 HOURS PRN
Status: CANCELLED
Start: 2019-12-17

## 2019-11-26 RX ORDER — MEPERIDINE HYDROCHLORIDE 25 MG/ML
25 INJECTION INTRAMUSCULAR; INTRAVENOUS; SUBCUTANEOUS EVERY 30 MIN PRN
Status: CANCELLED | OUTPATIENT
Start: 2019-12-17

## 2019-11-26 RX ORDER — EPINEPHRINE 1 MG/ML
0.3 INJECTION, SOLUTION, CONCENTRATE INTRAVENOUS EVERY 5 MIN PRN
Status: CANCELLED | OUTPATIENT
Start: 2019-12-17

## 2019-11-26 RX ORDER — SODIUM CHLORIDE 9 MG/ML
1000 INJECTION, SOLUTION INTRAVENOUS CONTINUOUS PRN
Status: CANCELLED
Start: 2019-12-10

## 2019-11-26 RX ORDER — SODIUM CHLORIDE 9 MG/ML
1000 INJECTION, SOLUTION INTRAVENOUS CONTINUOUS PRN
Status: CANCELLED
Start: 2019-12-03

## 2019-11-26 RX ORDER — EPINEPHRINE 1 MG/ML
0.3 INJECTION, SOLUTION, CONCENTRATE INTRAVENOUS EVERY 5 MIN PRN
Status: CANCELLED | OUTPATIENT
Start: 2019-12-03

## 2019-11-26 RX ORDER — ALBUTEROL SULFATE 90 UG/1
1-2 AEROSOL, METERED RESPIRATORY (INHALATION)
Status: CANCELLED
Start: 2019-12-10

## 2019-11-26 RX ORDER — MEPERIDINE HYDROCHLORIDE 25 MG/ML
25 INJECTION INTRAMUSCULAR; INTRAVENOUS; SUBCUTANEOUS EVERY 30 MIN PRN
Status: CANCELLED | OUTPATIENT
Start: 2019-12-10

## 2019-11-26 RX ORDER — ALBUTEROL SULFATE 0.83 MG/ML
2.5 SOLUTION RESPIRATORY (INHALATION)
Status: CANCELLED | OUTPATIENT
Start: 2019-12-03

## 2019-11-26 RX ORDER — LORAZEPAM 2 MG/ML
0.5 INJECTION INTRAMUSCULAR EVERY 4 HOURS PRN
Status: CANCELLED
Start: 2019-11-26

## 2019-11-26 RX ORDER — DIPHENHYDRAMINE HYDROCHLORIDE 50 MG/ML
50 INJECTION INTRAMUSCULAR; INTRAVENOUS
Status: CANCELLED
Start: 2019-12-17

## 2019-11-26 RX ORDER — ALBUTEROL SULFATE 0.83 MG/ML
2.5 SOLUTION RESPIRATORY (INHALATION)
Status: CANCELLED | OUTPATIENT
Start: 2019-12-17

## 2019-11-26 RX ORDER — METHYLPREDNISOLONE SODIUM SUCCINATE 125 MG/2ML
125 INJECTION, POWDER, LYOPHILIZED, FOR SOLUTION INTRAMUSCULAR; INTRAVENOUS
Status: CANCELLED
Start: 2019-12-10

## 2019-11-26 RX ORDER — DIPHENHYDRAMINE HYDROCHLORIDE 50 MG/ML
50 INJECTION INTRAMUSCULAR; INTRAVENOUS
Status: CANCELLED
Start: 2019-12-10

## 2019-11-26 RX ADMIN — ONDANSETRON HYDROCHLORIDE 8 MG: 4 TABLET, FILM COATED ORAL at 12:15

## 2019-11-26 RX ADMIN — BORTEZOMIB 2.6 MG: 3.5 INJECTION, POWDER, LYOPHILIZED, FOR SOLUTION INTRAVENOUS; SUBCUTANEOUS at 12:18

## 2019-11-26 ASSESSMENT — PATIENT HEALTH QUESTIONNAIRE - PHQ9: SUM OF ALL RESPONSES TO PHQ QUESTIONS 1-9: 3

## 2019-11-26 ASSESSMENT — PAIN SCALES - GENERAL: PAINLEVEL: NO PAIN (0)

## 2019-11-26 ASSESSMENT — MIFFLIN-ST. JEOR
SCORE: 1174
SCORE: 1174.13

## 2019-11-26 NOTE — PATIENT INSTRUCTIONS
We would like to see you back per your calender. Please come 5 days prior for lab work and 30 minutes prior to your appointment for labwork.     When you are in need of a refill, please call your pharmacy and they will send us a request.     If you have any questions please call [phone]    Other instructions:  none

## 2019-11-26 NOTE — NURSING NOTE
"Chief Complaint   Patient presents with     RECHECK     Follow up Multiple myeloma not having achieved remission        Initial /80   Pulse 91   Temp 98  F (36.7  C) (Tympanic)   Resp 18   Ht 1.6 m (5' 3\")   Wt 69 kg (152 lb 1.9 oz)   SpO2 98%   BMI 26.95 kg/m   Estimated body mass index is 26.95 kg/m  as calculated from the following:    Height as of this encounter: 1.6 m (5' 3\").    Weight as of this encounter: 69 kg (152 lb 1.9 oz).  Medication Reconciliation: complete.  Immunizations and advance directives status reviewed. Pain scale =0 , PHQ-9 =3.            Vilma Chi LPN    "

## 2019-11-26 NOTE — PROGRESS NOTES
Patient is a 69 year old female her today for injection of Velcade per order of . Patient meets parameters for today's infusion. Patient identified with two identifiers, order verified, and verbal consent for today's infusion obtained from patient. Written consent for treatment is on file and valid.    Today's lab values: WBC: 5.5, HGB: 10.8, PLT: 229  ANC: 2.9. Patient meets order parameters for today's treatment.  Patient denies questions or concerns regarding injection and/or medication(s) being administered.    Independent dose check completed with Yancy Quintanilla RN.    1218 Velcade injected SQ into right upper outer arm at 45 degree angle per protocol rotating sites.     Injection administered per protocol. Patient tolerated infusion without incident, no signs or symptoms of adverse reaction noted. Patient denies pain or discomfort.     Covered with a sterile bandage. Pt instructed to leave bandage intact for a minimum of one hour, and to call with questions or concerns. Copy of appointments, discharge instructions, and after visit summary (AVS) provided to patient. Patient states understanding, discharged ambulatory. Irene Booth RN

## 2019-11-27 LAB
ALBUMIN SERPL ELPH-MCNC: 4 G/DL (ref 3.7–5.1)
ALPHA1 GLOB SERPL ELPH-MCNC: 0.3 G/DL (ref 0.2–0.4)
ALPHA2 GLOB SERPL ELPH-MCNC: 0.8 G/DL (ref 0.5–0.9)
B-GLOBULIN SERPL ELPH-MCNC: 0.6 G/DL (ref 0.6–1)
GAMMA GLOB SERPL ELPH-MCNC: 3.5 G/DL (ref 0.7–1.6)
IGA SERPL-MCNC: 7 MG/DL (ref 70–380)
IGG SERPL-MCNC: 3820 MG/DL (ref 695–1620)
IGM SERPL-MCNC: 10 MG/DL (ref 60–265)
KAPPA LC UR-MCNC: 0.8 MG/DL (ref 0.33–1.94)
KAPPA LC/LAMBDA SER: 2.67 {RATIO} (ref 0.26–1.65)
LAMBDA LC SERPL-MCNC: 0.3 MG/DL (ref 0.57–2.63)
M PROTEIN SERPL ELPH-MCNC: 3.2 G/DL
PROT PATTERN SERPL ELPH-IMP: ABNORMAL
PROT PATTERN SERPL IFE-IMP: NORMAL

## 2019-12-03 ENCOUNTER — APPOINTMENT (OUTPATIENT)
Dept: LAB | Facility: OTHER | Age: 69
End: 2019-12-03
Attending: INTERNAL MEDICINE
Payer: MEDICARE

## 2019-12-03 ENCOUNTER — PATIENT OUTREACH (OUTPATIENT)
Dept: ONCOLOGY | Facility: OTHER | Age: 69
End: 2019-12-03

## 2019-12-03 ENCOUNTER — INFUSION THERAPY VISIT (OUTPATIENT)
Dept: INFUSION THERAPY | Facility: OTHER | Age: 69
End: 2019-12-03
Attending: INTERNAL MEDICINE
Payer: MEDICARE

## 2019-12-03 VITALS
HEIGHT: 63 IN | TEMPERATURE: 98.6 F | DIASTOLIC BLOOD PRESSURE: 70 MMHG | HEART RATE: 84 BPM | RESPIRATION RATE: 16 BRPM | BODY MASS INDEX: 26.8 KG/M2 | SYSTOLIC BLOOD PRESSURE: 104 MMHG | OXYGEN SATURATION: 96 % | WEIGHT: 151.24 LBS

## 2019-12-03 DIAGNOSIS — C90.00 MULTIPLE MYELOMA NOT HAVING ACHIEVED REMISSION (H): Primary | ICD-10-CM

## 2019-12-03 LAB
ALBUMIN SERPL-MCNC: 3 G/DL (ref 3.4–5)
ALP SERPL-CCNC: 111 U/L (ref 40–150)
ALT SERPL W P-5'-P-CCNC: 120 U/L (ref 0–50)
AST SERPL W P-5'-P-CCNC: 75 U/L (ref 0–45)
BASOPHILS # BLD AUTO: 0 10E9/L (ref 0–0.2)
BASOPHILS NFR BLD AUTO: 0.2 %
BILIRUB DIRECT SERPL-MCNC: <0.1 MG/DL (ref 0–0.2)
BILIRUB SERPL-MCNC: 0.3 MG/DL (ref 0.2–1.3)
DIFFERENTIAL METHOD BLD: ABNORMAL
EOSINOPHIL # BLD AUTO: 0.1 10E9/L (ref 0–0.7)
EOSINOPHIL NFR BLD AUTO: 1.9 %
ERYTHROCYTE [DISTWIDTH] IN BLOOD BY AUTOMATED COUNT: 15.7 % (ref 10–15)
HCT VFR BLD AUTO: 30.5 % (ref 35–47)
HGB BLD-MCNC: 10.3 G/DL (ref 11.7–15.7)
IMM GRANULOCYTES # BLD: 0.1 10E9/L (ref 0–0.4)
IMM GRANULOCYTES NFR BLD: 1.7 %
LYMPHOCYTES # BLD AUTO: 1.3 10E9/L (ref 0.8–5.3)
LYMPHOCYTES NFR BLD AUTO: 24.8 %
MCH RBC QN AUTO: 31.6 PG (ref 26.5–33)
MCHC RBC AUTO-ENTMCNC: 33.8 G/DL (ref 31.5–36.5)
MCV RBC AUTO: 94 FL (ref 78–100)
MONOCYTES # BLD AUTO: 1 10E9/L (ref 0–1.3)
MONOCYTES NFR BLD AUTO: 19 %
NEUTROPHILS # BLD AUTO: 2.8 10E9/L (ref 1.6–8.3)
NEUTROPHILS NFR BLD AUTO: 52.4 %
NRBC # BLD AUTO: 0 10*3/UL
NRBC BLD AUTO-RTO: 0 /100
PLATELET # BLD AUTO: 215 10E9/L (ref 150–450)
PROT SERPL-MCNC: 9.1 G/DL (ref 6.8–8.8)
RBC # BLD AUTO: 3.26 10E12/L (ref 3.8–5.2)
WBC # BLD AUTO: 5.4 10E9/L (ref 4–11)

## 2019-12-03 PROCEDURE — 80076 HEPATIC FUNCTION PANEL: CPT | Mod: ZL | Performed by: NURSE PRACTITIONER

## 2019-12-03 PROCEDURE — 85025 COMPLETE CBC W/AUTO DIFF WBC: CPT | Mod: ZL | Performed by: NURSE PRACTITIONER

## 2019-12-03 PROCEDURE — 36415 COLL VENOUS BLD VENIPUNCTURE: CPT | Mod: ZL | Performed by: NURSE PRACTITIONER

## 2019-12-03 PROCEDURE — 25000128 H RX IP 250 OP 636: Mod: JW | Performed by: NURSE PRACTITIONER

## 2019-12-03 PROCEDURE — 96401 CHEMO ANTI-NEOPL SQ/IM: CPT

## 2019-12-03 RX ADMIN — BORTEZOMIB 2.6 MG: 3.5 INJECTION, POWDER, LYOPHILIZED, FOR SOLUTION INTRAVENOUS; SUBCUTANEOUS at 14:31

## 2019-12-03 ASSESSMENT — MIFFLIN-ST. JEOR: SCORE: 1169.97

## 2019-12-03 ASSESSMENT — PAIN SCALES - GENERAL: PAINLEVEL: NO PAIN (0)

## 2019-12-03 NOTE — PROGRESS NOTES
Patient is a 69 year old female here today for injection of Velcade per order of . Patient meets parameters for today's infusion. Patient identified with two identifiers, order verified, and verbal consent for today's infusion obtained from patient. Written consent for treatment is on file and valid.    Recent lab values: WBC: 5.4, HGB: 10.3, PLT: 215  ANC: 2.8. Patient meets order parameters for today's treatment.  Patient denies questions or concerns regarding injection and/or medication(s) being administered.  Velcade injected SQ into left upper outer subcutaneous arm per protocol rotating sites.     Injection administered per protocol. Patient tolerated infusion without incident, no signs or symptoms of adverse reaction noted. Patient denies pain or discomfort.     Covered with a sterile bandage. Pt instructed to leave bandage intact for a minimum of one hour, and to call with questions or concerns. Copy of appointments, discharge instructions, and after visit summary (AVS) provided to patient. Patient states understanding, discharged ambulatory.

## 2019-12-03 NOTE — PROGRESS NOTES
Discussed patient's liver functions with Bonita Quintana and Dr. Walker. Per Dr. Walker, hold oral chemo (Cytoxan) today, give velcade and Dex today, recheck liver functions next week. We will make a determination for patient to receive her Cytoxan on C3 D15. Treatment plan is up to date as far as labs go.     Patient was updated on the info above. She verbalizes understanding and agrees with this plan.

## 2019-12-03 NOTE — PROGRESS NOTES
Spoke with  on pt AST/ALT. New order to hold oral Cytoxan this week. Give Velcade and Dex today. Recheck liver function next week. Infusion updated.    Please note.    Akilah Garcia RN   Oncology Care Coordinator

## 2019-12-10 ENCOUNTER — INFUSION THERAPY VISIT (OUTPATIENT)
Dept: INFUSION THERAPY | Facility: OTHER | Age: 69
End: 2019-12-10
Attending: INTERNAL MEDICINE
Payer: MEDICARE

## 2019-12-10 ENCOUNTER — OFFICE VISIT (OUTPATIENT)
Dept: OTOLARYNGOLOGY | Facility: OTHER | Age: 69
End: 2019-12-10
Attending: PHYSICIAN ASSISTANT
Payer: MEDICARE

## 2019-12-10 VITALS
HEIGHT: 63 IN | SYSTOLIC BLOOD PRESSURE: 112 MMHG | OXYGEN SATURATION: 98 % | DIASTOLIC BLOOD PRESSURE: 66 MMHG | TEMPERATURE: 98.5 F | WEIGHT: 153 LBS | HEART RATE: 104 BPM | BODY MASS INDEX: 27.11 KG/M2

## 2019-12-10 VITALS
RESPIRATION RATE: 16 BRPM | OXYGEN SATURATION: 97 % | WEIGHT: 153.66 LBS | HEART RATE: 86 BPM | HEIGHT: 63 IN | SYSTOLIC BLOOD PRESSURE: 121 MMHG | BODY MASS INDEX: 27.23 KG/M2 | TEMPERATURE: 97.9 F | DIASTOLIC BLOOD PRESSURE: 76 MMHG

## 2019-12-10 DIAGNOSIS — Z92.21 STATUS POST CHEMOTHERAPY: ICD-10-CM

## 2019-12-10 DIAGNOSIS — Z96.22 S/P MYRINGOTOMY WITH INSERTION OF TUBE: Primary | ICD-10-CM

## 2019-12-10 DIAGNOSIS — H69.93 ETD (EUSTACHIAN TUBE DYSFUNCTION), BILATERAL: ICD-10-CM

## 2019-12-10 DIAGNOSIS — H90.6 MIXED HEARING LOSS, BILATERAL: ICD-10-CM

## 2019-12-10 DIAGNOSIS — C90.00 MULTIPLE MYELOMA NOT HAVING ACHIEVED REMISSION (H): ICD-10-CM

## 2019-12-10 DIAGNOSIS — Z53.9 ERRONEOUS ENCOUNTER--DISREGARD: Primary | ICD-10-CM

## 2019-12-10 DIAGNOSIS — E78.2 MIXED HYPERLIPIDEMIA: ICD-10-CM

## 2019-12-10 DIAGNOSIS — C90.00 MULTIPLE MYELOMA NOT HAVING ACHIEVED REMISSION (H): Primary | ICD-10-CM

## 2019-12-10 LAB
ALBUMIN SERPL-MCNC: 3.2 G/DL (ref 3.4–5)
ALP SERPL-CCNC: 107 U/L (ref 40–150)
ALT SERPL W P-5'-P-CCNC: 63 U/L (ref 0–50)
AST SERPL W P-5'-P-CCNC: 32 U/L (ref 0–45)
BASOPHILS # BLD AUTO: 0 10E9/L (ref 0–0.2)
BASOPHILS NFR BLD AUTO: 0.4 %
BILIRUB DIRECT SERPL-MCNC: <0.1 MG/DL (ref 0–0.2)
BILIRUB SERPL-MCNC: 0.3 MG/DL (ref 0.2–1.3)
DIFFERENTIAL METHOD BLD: ABNORMAL
EOSINOPHIL # BLD AUTO: 0.1 10E9/L (ref 0–0.7)
EOSINOPHIL NFR BLD AUTO: 1.6 %
ERYTHROCYTE [DISTWIDTH] IN BLOOD BY AUTOMATED COUNT: 15.2 % (ref 10–15)
HCT VFR BLD AUTO: 31.7 % (ref 35–47)
HGB BLD-MCNC: 10.5 G/DL (ref 11.7–15.7)
IMM GRANULOCYTES # BLD: 0.1 10E9/L (ref 0–0.4)
IMM GRANULOCYTES NFR BLD: 1.8 %
LYMPHOCYTES # BLD AUTO: 1.4 10E9/L (ref 0.8–5.3)
LYMPHOCYTES NFR BLD AUTO: 28.1 %
MCH RBC QN AUTO: 30.9 PG (ref 26.5–33)
MCHC RBC AUTO-ENTMCNC: 33.1 G/DL (ref 31.5–36.5)
MCV RBC AUTO: 93 FL (ref 78–100)
MONOCYTES # BLD AUTO: 0.8 10E9/L (ref 0–1.3)
MONOCYTES NFR BLD AUTO: 17.2 %
NEUTROPHILS # BLD AUTO: 2.5 10E9/L (ref 1.6–8.3)
NEUTROPHILS NFR BLD AUTO: 50.9 %
NRBC # BLD AUTO: 0 10*3/UL
NRBC BLD AUTO-RTO: 0 /100
PLATELET # BLD AUTO: 224 10E9/L (ref 150–450)
PROT SERPL-MCNC: 9 G/DL (ref 6.8–8.8)
RBC # BLD AUTO: 3.4 10E12/L (ref 3.8–5.2)
WBC # BLD AUTO: 4.9 10E9/L (ref 4–11)

## 2019-12-10 PROCEDURE — G0463 HOSPITAL OUTPT CLINIC VISIT: HCPCS | Mod: 25

## 2019-12-10 PROCEDURE — 36415 COLL VENOUS BLD VENIPUNCTURE: CPT | Mod: ZL | Performed by: NURSE PRACTITIONER

## 2019-12-10 PROCEDURE — 99213 OFFICE O/P EST LOW 20 MIN: CPT | Performed by: PHYSICIAN ASSISTANT

## 2019-12-10 PROCEDURE — 85025 COMPLETE CBC W/AUTO DIFF WBC: CPT | Mod: ZL | Performed by: NURSE PRACTITIONER

## 2019-12-10 PROCEDURE — 25000128 H RX IP 250 OP 636: Performed by: NURSE PRACTITIONER

## 2019-12-10 PROCEDURE — 96401 CHEMO ANTI-NEOPL SQ/IM: CPT

## 2019-12-10 PROCEDURE — 80076 HEPATIC FUNCTION PANEL: CPT | Mod: ZL | Performed by: NURSE PRACTITIONER

## 2019-12-10 PROCEDURE — G0463 HOSPITAL OUTPT CLINIC VISIT: HCPCS

## 2019-12-10 RX ADMIN — BORTEZOMIB 2.6 MG: 3.5 INJECTION, POWDER, LYOPHILIZED, FOR SOLUTION INTRAVENOUS; SUBCUTANEOUS at 11:41

## 2019-12-10 ASSESSMENT — PAIN SCALES - GENERAL
PAINLEVEL: NO PAIN (0)
PAINLEVEL: NO PAIN (0)

## 2019-12-10 ASSESSMENT — MIFFLIN-ST. JEOR
SCORE: 1178.13
SCORE: 1181

## 2019-12-10 NOTE — PROGRESS NOTES
Patient is a 69 year old female her today for injection of Velcade per order of . Patient meets parameters for today's infusion. Patient identified with two identifiers, order verified, and verbal consent for today's infusion obtained from patient. Written consent for treatment is on file and valid.    Today's lab values: WBC: 4.9, HGB: 10.5, PLT: 224  ANC: 2.5, AST: 32, ALT: 63, Alkaline Phosphatase: 107. Patient meets order parameters for today's treatment.    Call to Bonita Quintana, updating on Hep Panel returning mostly to normal. Bonita notes that this shows the Cytoxan was causing the elevation in liver enzymes. She gave TORB to HOLD Cytoxan again this week, ok to take Velcade and Dexamethasone, and Bonita will discuss with Dr Walker regarding what to do going forward and get back to us. Patient updated, in agreement with plan. Did not want the Zofran, as this nurse noted it is more for the Cytoxan that she gets it than for the Velcade.     Patient denies questions or concerns regarding injection and/or medication(s) being administered.    Independent dose check completed with Barron Booth RN.    Velcade injected SQ into right upper outer arm per protocol rotating sites.     Injection administered per protocol. Patient tolerated infusion without incident, no signs or symptoms of adverse reaction noted. Patient denies pain or discomfort.     Covered with a sterile bandage. Pt instructed to leave bandage intact for a minimum of one hour, and to call with questions or concerns. Patient states understanding, discharged ambulatory.

## 2019-12-10 NOTE — PATIENT INSTRUCTIONS
Ears look good.   Tubes are in good position and open.       Thank you for allowing Bridgette Ly PA-C and our ENT team to participate in your care.  If your medications are too expensive, please give the nurse a call.  We can possibly change this medication.  If you have a scheduling or an appointment question please contact our Health Unit Coordinator at their direct line [phone].   ALL nursing questions or concerns can be directed to your ENT nurse at: [phone] Ruby

## 2019-12-10 NOTE — PROGRESS NOTES
Chief Complaint   Patient presents with     Ear Problem     Pt is here for her ears.  Pt states that she is feeling fullness in her ears and feels like her hearing is off.     Patient was seen on 11/7/19.   She was doing well and had normal ear exam (c/w BTT).     She has been feeling like she has noticing an increase in fullness in her ears.   She developed over the last few weeks. She had felt no chronic nasal congestion. She does not feel worsening congestion or URI symptoms.   She has been working on rinses, nasal sprays, AH.         Past Medical History:   Diagnosis Date     Arthritis      Depressive disorder      Essential hypertension 10/1/2015     Major depressive disorder, recurrent episode, mild (H) 10/1/2015     Mixed hyperlipidemia 10/1/2015     Multiple myeloma not having achieved remission (H) 6/24/2019     Other specified disorders of bladder 07/09/2012    Irritable Bladder     Seasonal allergies 10/1/2015     Unspecified essential hypertension 03/19/2007     Unspecified sinusitis (chronic) 09/05/2007        Allergies   Allergen Reactions     Lisinopril Cough     Phenylephrine Hcl Other (See Comments)     **Entex  HEADACHE (SEVERE)     Phenylpropanolamine Other (See Comments)     **Entex  HEADACHE (SEVERE)     Pseudoephedrine Tannate Other (See Comments)     **Entex  HEADACHE (SEVERE)     Levofloxacin Rash     **Levaquin     Moxifloxacin Hcl [Avelox] Rash     Current Outpatient Medications   Medication     acyclovir (ZOVIRAX) 400 MG tablet     atorvastatin (LIPITOR) 10 MG tablet     Bortezomib (VELCADE IJ)     cyclophosphamide (CYTOXAN) 50 MG capsule     dexamethasone (DECADRON) 4 MG tablet     EPINEPHrine (EPIPEN) 0.3 MG/0.3ML injection     fluticasone (FLONASE) 50 MCG/ACT nasal spray     LORazepam (ATIVAN) 0.5 MG tablet     losartan (COZAAR) 50 MG tablet     ondansetron (ZOFRAN) 8 MG tablet     prochlorperazine (COMPAZINE) 10 MG tablet     sertraline (ZOLOFT) 50 MG tablet     No current  "facility-administered medications for this visit.      Facility-Administered Medications Ordered in Other Visits   Medication     bortezomib (VELCADE) 2.5 mg/mL in 0.9% NaCl 2.6 mg      ROS: 10 point ROS neg other than the symptoms noted above in the HPI.  /66 (BP Location: Left arm, Cuff Size: Adult Regular)   Pulse 104   Temp 98.5  F (36.9  C) (Tympanic)   Ht 1.6 m (5' 3\")   Wt 69.4 kg (153 lb)   SpO2 98%   BMI 27.10 kg/m      General - The patient is well nourished and well developed, and appears to have good nutritional status.    Head and Face - Normocephalic and atraumatic, with no gross asymmetry noted of the contour of the facial features.  The facial nerve is intact, with strong symmetric movements.  Eyes - Extraocular movements intact, and the pupils were reactive to light.  Sclera were not icteric or injected, conjunctiva were pink and moist.  Mouth - Examination of the oral cavity shows pink, healthy, moist mucosa.  No lesions or ulceration noted.  The dentition are in good repair.  The tongue is mobile and midline.  Ears - Examination of the ears showed myringotomy tubes in good position bilaterally.  The tympanic membranes were gray and translucent.  No evidence of middle ear effusion, granulation tissue, or cholesteatoma.         ASSESSMENT:    ICD-10-CM    1. S/P myringotomy with insertion of tube Z96.22    2. ETD (Eustachian tube dysfunction), bilateral H69.83    3. 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 Status post chemotherapy Z92.21        Ears look well.   Tubes are in good position and open.   If you develop ear fullness or drainage- contact nurse.      Annual hearing test.   Recheck ears in 6-12 months.      Continue with dennys med rinse, Flonase and Zyrtec.       Bridgette Ly PA-C  ENT  [facility], [place]  [phone]    "

## 2019-12-10 NOTE — NURSING NOTE
"Chief Complaint   Patient presents with     Ear Problem     Pt is here for her ears.  Pt states that she is feeling fullness in her ears and feels like her hearing is off.       Initial /66 (BP Location: Left arm, Cuff Size: Adult Regular)   Pulse 104   Temp 98.5  F (36.9  C) (Tympanic)   Ht 1.6 m (5' 3\")   Wt 69.4 kg (153 lb)   SpO2 98%   BMI 27.10 kg/m   Estimated body mass index is 27.1 kg/m  as calculated from the following:    Height as of this encounter: 1.6 m (5' 3\").    Weight as of this encounter: 69.4 kg (153 lb).  Medication Reconciliation: complete  Ruby Loomis LPN    "

## 2019-12-10 NOTE — PATIENT INSTRUCTIONS
We will see you back as planned. If you have any questions or concerns, we can be reached Monday through Friday 8am - 430pm at [phone] (Care Coordinator), if unable to reach them, you can try [phone] ([facility]), [phone] (LPN with Dr Walker), or [phone] (Infusion). If you have concerns related to a potential reaction/side effect after hours/weekends/holiday's, please seek emergent medical care.   Patient Education     Discharge Instructions for Chemotherapy  Your healthcare provider prescribed a type of medicine therapy for you called chemotherapy. Healthcare providers prescribe chemotherapy for many different types of illnesses, including cancer. There are many types of chemotherapy. This sheet provides general guidelines on how you can take care of yourself after your chemotherapy.  Mouth care  Don t be discouraged if you get mouth sores, even if you are following all your healthcare provider s instructions. Many people get mouth sores as a side effect of chemotherapy. Here s what you can do to prevent mouth sores:    Keep your mouth clean. Brush your teeth with a soft-bristle toothbrush after every meal.    Ask if you should use a toothpaste with fluoride, or a mixture of 1 teaspoon of salt in 8-ounces of water to brush your teeth.     Use an oral swab or special soft toothbrush if your gums bleed during regular brushing.    Don't use dental floss if it causes your gums to bleed.    Use any mouthwashes given to you as directed.    If you can t tolerate regular methods, use salt and baking soda to clean your mouth. Mix 1 teaspoon of salt and 1 teaspoon of baking soda in 1 quart of warm water. Swish and spit.    If you wear dentures, you may be told to wear them only when you eat, ask your healthcare provider. Clean dentures twice a day and soak in antimicrobial solution when you aren't wearing them. Rinse your mouth after each meal.     Watch your mouth and tongue for white patches. This may be a  sign of a type of yeast infection (thrush), a common side effect of chemotherapy. Be sure to tell your healthcare provider about these patches. Medicine can be prescribed to treat it.  Other home care  Here's what else you can do:    Try to exercise. Exercise keeps you strong and keeps your heart and lungs active. Walking and yoga are good types of exercise.     Keep clean. During chemotherapy, your body can t fight infection very well. Take short baths or showers.  ? Wash your hands before you eat and after going to the bathroom.  ? Use moisturizing soap. Chemotherapy can make your skin dry.  ? Apply moisturizing lotion several times a day to help relieve dry skin.  ? Don t take very hot or very cold showers or baths.    Don t be surprised if your chemotherapy causes slight burns to your skin--usually on the hands and feet. Some medicines used in high doses cause this to happen. Ask for a special cream to help relieve the burn and protect your skin.    Avoid people who are sick with illnesses and diseases you could catch, such as colds, flu, measles, or chicken pox as well as people who have recently had vaccinations for these illnesses.     Let your healthcare provider know if your throat is sore. You may have an infection that needs treatment.    Remember, many patients feel sick and lose their appetites during treatment. Eat small meals several times a day to keep your strength up:  ? Choose bland foods with little taste or smell if you are reacting strongly to food.  ? Be sure to cook all food thoroughly. This kills bacteria and helps you avoid infection.  ? Eat foods that are soft. Soft foods are less likely to cause stomach irritation.  ? Try to eat a variety of foods for a well-balanced diet. Drink plenty of fluids and eat foods with fiber to avoid constipation.   When to call your healthcare provider  Call your healthcare provider right away if you have any of the following:    Unexplained  bleeding    Trouble concentrating    Ongoing fatigue    Shortness of breath, wheezing, trouble breathing, or bad cough    Rapid, irregular heartbeat, or chest pain    Dizziness, lightheadedness    Constant feeling of being cold    Hives or a cut or rash that swells, turns red, feels hot or painful, or begins to ooze    Burning when you urinate    Fever of 100.4 F (38 C) or higher, or as directed by your healthcare provider   Date Last Reviewed: 5/1/2016 2000-2018 [employer]. [address] [place] [address]. All rights reserved. This information is not intended as a substitute for professional medical care. Always follow your healthcare professional's instructions.

## 2019-12-10 NOTE — LETTER
12/10/2019         RE: Carmelita Watts  [address] [facility] [address]        Dear Colleague,    Thank you for referring your patient, Carmelita Watts, to the [facility]. Please see a copy of my visit note below.    Chief Complaint   Patient presents with     Ear Problem     Pt is here for her ears.  Pt states that she is feeling fullness in her ears and feels like her hearing is off.     Patient was seen on 11/7/19.   She was doing well and had normal ear exam (c/w BTT).     She has been feeling like she has noticing an increase in fullness in her ears.   She developed over the last few weeks. She had felt no chronic nasal congestion. She does not feel worsening congestion or URI symptoms.   She has been working on rinses, nasal sprays, AH.         Past Medical History:   Diagnosis Date     Arthritis      Depressive disorder      Essential hypertension 10/1/2015     Major depressive disorder, recurrent episode, mild (H) 10/1/2015     Mixed hyperlipidemia 10/1/2015     Multiple myeloma not having achieved remission (H) 6/24/2019     Other specified disorders of bladder 07/09/2012    Irritable Bladder     Seasonal allergies 10/1/2015     Unspecified essential hypertension 03/19/2007     Unspecified sinusitis (chronic) 09/05/2007        Allergies   Allergen Reactions     Lisinopril Cough     Phenylephrine Hcl Other (See Comments)     **Entex  HEADACHE (SEVERE)     Phenylpropanolamine Other (See Comments)     **Entex  HEADACHE (SEVERE)     Pseudoephedrine Tannate Other (See Comments)     **Entex  HEADACHE (SEVERE)     Levofloxacin Rash     **Levaquin     Moxifloxacin Hcl [Avelox] Rash     Current Outpatient Medications   Medication     acyclovir (ZOVIRAX) 400 MG tablet     atorvastatin (LIPITOR) 10 MG tablet     Bortezomib (VELCADE IJ)     cyclophosphamide (CYTOXAN) 50 MG capsule     dexamethasone (DECADRON) 4 MG tablet     EPINEPHrine (EPIPEN) 0.3 MG/0.3ML injection     fluticasone  "(FLONASE) 50 MCG/ACT nasal spray     LORazepam (ATIVAN) 0.5 MG tablet     losartan (COZAAR) 50 MG tablet     ondansetron (ZOFRAN) 8 MG tablet     prochlorperazine (COMPAZINE) 10 MG tablet     sertraline (ZOLOFT) 50 MG tablet     No current facility-administered medications for this visit.      Facility-Administered Medications Ordered in Other Visits   Medication     bortezomib (VELCADE) 2.5 mg/mL in 0.9% NaCl 2.6 mg      ROS: 10 point ROS neg other than the symptoms noted above in the HPI.  /66 (BP Location: Left arm, Cuff Size: Adult Regular)   Pulse 104   Temp 98.5  F (36.9  C) (Tympanic)   Ht 1.6 m (5' 3\")   Wt 69.4 kg (153 lb)   SpO2 98%   BMI 27.10 kg/m       General - The patient is well nourished and well developed, and appears to have good nutritional status.    Head and Face - Normocephalic and atraumatic, with no gross asymmetry noted of the contour of the facial features.  The facial nerve is intact, with strong symmetric movements.  Eyes - Extraocular movements intact, and the pupils were reactive to light.  Sclera were not icteric or injected, conjunctiva were pink and moist.  Mouth - Examination of the oral cavity shows pink, healthy, moist mucosa.  No lesions or ulceration noted.  The dentition are in good repair.  The tongue is mobile and midline.  Ears - Examination of the ears showed myringotomy tubes in good position bilaterally.  The tympanic membranes were gray and translucent.  No evidence of middle ear effusion, granulation tissue, or cholesteatoma.         ASSESSMENT:    ICD-10-CM    1. S/P myringotomy with insertion of tube Z96.22    2. ETD (Eustachian tube dysfunction), bilateral H69.83    3. 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 Status post chemotherapy Z92.21        Ears look well.   Tubes are in good position and open.   If you develop ear fullness or drainage- contact nurse.      Annual hearing test.   Recheck ears in 6-12 months.      Continue with dennys med rinse, " Flonase and Zyrtec.       Bridgette Ly PA-C  [facility], [place]  [phone]      Again, thank you for allowing me to participate in the care of your patient.        Sincerely,        Bridgette Ly PA-C

## 2019-12-11 ENCOUNTER — TELEPHONE (OUTPATIENT)
Dept: ONCOLOGY | Facility: OTHER | Age: 69
End: 2019-12-11

## 2019-12-11 DIAGNOSIS — C90.00 MULTIPLE MYELOMA NOT HAVING ACHIEVED REMISSION (H): Primary | ICD-10-CM

## 2019-12-11 RX ORDER — CYCLOPHOSPHAMIDE 50 MG/1
CAPSULE ORAL
Qty: 12 CAPSULE | Refills: 11 | Status: SHIPPED | OUTPATIENT
Start: 2019-12-11 | End: 2020-02-18

## 2019-12-11 NOTE — TELEPHONE ENCOUNTER
Please call and let patient know that I did talk with Dr. Walker about the oral Cytoxan and her increasing liver functions.  He would like to have her take 150 mg on day 1, 8, 15 and 22 so please be sure she has the medication at home and find out how many.  We will likely need to send in a new prescription for her next cycle.  I would like her to take the dose today as her day 15 dose since she did get her chemotherapy yesterday.

## 2019-12-11 NOTE — TELEPHONE ENCOUNTER
Called patient and she has #12 Cytoxan left. Updated her that NP spoke to MD regarding medication and liver function. Told her to take 3 capsules to make 150mg dose and to take today. For day #15.Clarified dose with her and she verbalized understanding.    Akilah Garcia , RN   Oncology Care Coordinator

## 2019-12-17 ENCOUNTER — APPOINTMENT (OUTPATIENT)
Dept: LAB | Facility: OTHER | Age: 69
End: 2019-12-17
Attending: INTERNAL MEDICINE
Payer: MEDICARE

## 2019-12-17 ENCOUNTER — INFUSION THERAPY VISIT (OUTPATIENT)
Dept: INFUSION THERAPY | Facility: OTHER | Age: 69
End: 2019-12-17
Attending: INTERNAL MEDICINE
Payer: MEDICARE

## 2019-12-17 VITALS
SYSTOLIC BLOOD PRESSURE: 127 MMHG | OXYGEN SATURATION: 99 % | HEIGHT: 63 IN | TEMPERATURE: 97 F | WEIGHT: 153 LBS | BODY MASS INDEX: 27.11 KG/M2 | DIASTOLIC BLOOD PRESSURE: 73 MMHG | HEART RATE: 84 BPM

## 2019-12-17 DIAGNOSIS — C90.00 MULTIPLE MYELOMA NOT HAVING ACHIEVED REMISSION (H): Primary | ICD-10-CM

## 2019-12-17 LAB
ALBUMIN SERPL-MCNC: 3.2 G/DL (ref 3.4–5)
ALP SERPL-CCNC: 108 U/L (ref 40–150)
ALT SERPL W P-5'-P-CCNC: 38 U/L (ref 0–50)
ANION GAP SERPL CALCULATED.3IONS-SCNC: 3 MMOL/L (ref 3–14)
AST SERPL W P-5'-P-CCNC: 20 U/L (ref 0–45)
BASOPHILS # BLD AUTO: 0 10E9/L (ref 0–0.2)
BASOPHILS NFR BLD AUTO: 0.2 %
BILIRUB DIRECT SERPL-MCNC: <0.1 MG/DL (ref 0–0.2)
BILIRUB SERPL-MCNC: 0.3 MG/DL (ref 0.2–1.3)
BUN SERPL-MCNC: 16 MG/DL (ref 7–30)
CALCIUM SERPL-MCNC: 8.8 MG/DL (ref 8.5–10.1)
CHLORIDE SERPL-SCNC: 103 MMOL/L (ref 94–109)
CO2 SERPL-SCNC: 29 MMOL/L (ref 20–32)
CREAT SERPL-MCNC: 0.6 MG/DL (ref 0.52–1.04)
DIFFERENTIAL METHOD BLD: ABNORMAL
EOSINOPHIL # BLD AUTO: 0.1 10E9/L (ref 0–0.7)
EOSINOPHIL NFR BLD AUTO: 2.4 %
ERYTHROCYTE [DISTWIDTH] IN BLOOD BY AUTOMATED COUNT: 14.9 % (ref 10–15)
GFR SERPL CREATININE-BSD FRML MDRD: >90 ML/MIN/{1.73_M2}
GLUCOSE SERPL-MCNC: 93 MG/DL (ref 70–99)
HCT VFR BLD AUTO: 30.8 % (ref 35–47)
HGB BLD-MCNC: 10.4 G/DL (ref 11.7–15.7)
IMM GRANULOCYTES # BLD: 0.1 10E9/L (ref 0–0.4)
IMM GRANULOCYTES NFR BLD: 1.4 %
LYMPHOCYTES # BLD AUTO: 1.3 10E9/L (ref 0.8–5.3)
LYMPHOCYTES NFR BLD AUTO: 26.4 %
MCH RBC QN AUTO: 31.2 PG (ref 26.5–33)
MCHC RBC AUTO-ENTMCNC: 33.8 G/DL (ref 31.5–36.5)
MCV RBC AUTO: 93 FL (ref 78–100)
MONOCYTES # BLD AUTO: 0.8 10E9/L (ref 0–1.3)
MONOCYTES NFR BLD AUTO: 15.3 %
NEUTROPHILS # BLD AUTO: 2.7 10E9/L (ref 1.6–8.3)
NEUTROPHILS NFR BLD AUTO: 54.3 %
NRBC # BLD AUTO: 0 10*3/UL
NRBC BLD AUTO-RTO: 0 /100
PLATELET # BLD AUTO: 192 10E9/L (ref 150–450)
POTASSIUM SERPL-SCNC: 3.6 MMOL/L (ref 3.4–5.3)
PROT SERPL-MCNC: 9.2 G/DL (ref 6.8–8.8)
RBC # BLD AUTO: 3.33 10E12/L (ref 3.8–5.2)
SODIUM SERPL-SCNC: 135 MMOL/L (ref 133–144)
WBC # BLD AUTO: 5 10E9/L (ref 4–11)

## 2019-12-17 PROCEDURE — 83883 ASSAY NEPHELOMETRY NOT SPEC: CPT | Mod: ZL | Performed by: INTERNAL MEDICINE

## 2019-12-17 PROCEDURE — 36415 COLL VENOUS BLD VENIPUNCTURE: CPT | Mod: ZL | Performed by: INTERNAL MEDICINE

## 2019-12-17 PROCEDURE — 86334 IMMUNOFIX E-PHORESIS SERUM: CPT | Mod: ZL | Performed by: INTERNAL MEDICINE

## 2019-12-17 PROCEDURE — 82784 ASSAY IGA/IGD/IGG/IGM EACH: CPT | Mod: ZL | Performed by: INTERNAL MEDICINE

## 2019-12-17 PROCEDURE — 84165 PROTEIN E-PHORESIS SERUM: CPT | Mod: ZL | Performed by: INTERNAL MEDICINE

## 2019-12-17 PROCEDURE — 96401 CHEMO ANTI-NEOPL SQ/IM: CPT

## 2019-12-17 PROCEDURE — 80053 COMPREHEN METABOLIC PANEL: CPT | Mod: ZL | Performed by: INTERNAL MEDICINE

## 2019-12-17 PROCEDURE — 25000128 H RX IP 250 OP 636: Performed by: INTERNAL MEDICINE

## 2019-12-17 PROCEDURE — 00000402 ZZHCL STATISTIC TOTAL PROTEIN: Mod: ZL | Performed by: INTERNAL MEDICINE

## 2019-12-17 PROCEDURE — 25000128 H RX IP 250 OP 636: Mod: JW | Performed by: NURSE PRACTITIONER

## 2019-12-17 PROCEDURE — 80076 HEPATIC FUNCTION PANEL: CPT | Mod: ZL | Performed by: INTERNAL MEDICINE

## 2019-12-17 PROCEDURE — 85025 COMPLETE CBC W/AUTO DIFF WBC: CPT | Mod: ZL | Performed by: NURSE PRACTITIONER

## 2019-12-17 PROCEDURE — 82248 BILIRUBIN DIRECT: CPT | Mod: ZL | Performed by: INTERNAL MEDICINE

## 2019-12-17 RX ORDER — ONDANSETRON 8 MG/1
8 TABLET, FILM COATED ORAL ONCE
Status: DISCONTINUED | OUTPATIENT
Start: 2019-12-17 | End: 2019-12-17

## 2019-12-17 RX ORDER — ONDANSETRON 4 MG/1
8 TABLET, FILM COATED ORAL ONCE
Status: COMPLETED | OUTPATIENT
Start: 2019-12-17 | End: 2019-12-17

## 2019-12-17 RX ADMIN — BORTEZOMIB 2.6 MG: 3.5 INJECTION, POWDER, LYOPHILIZED, FOR SOLUTION INTRAVENOUS; SUBCUTANEOUS at 14:17

## 2019-12-17 RX ADMIN — ONDANSETRON HYDROCHLORIDE 8 MG: 4 TABLET, FILM COATED ORAL at 13:21

## 2019-12-17 ASSESSMENT — MIFFLIN-ST. JEOR: SCORE: 1178

## 2019-12-17 NOTE — PATIENT INSTRUCTIONS
We will see you back as planned. If you have any questions or concerns, we can be reached Monday through Friday 8am - 430pm at [phone] (Care Coordinator), if unable to reach them, you can try [phone] ([facility]), [phone] (LPN with Dr Walker), or [phone] (Infusion). If you have concerns related to a potential reaction/side effect after hours/weekends/holiday's, please seek emergent medical care.

## 2019-12-17 NOTE — PROGRESS NOTES
Patient is a 69 year old female her today for injection of Velcade per order of . Patient meets parameters for today's infusion. Patient identified with two identifiers, order verified, and verbal consent for today's infusion obtained from patient. Written consent for treatment is on file and valid.    Today's lab values: WBC: 5.0, HGB: 10.4, PLT: 192  ANC: 2.7, Creatinine 0.60, AST 20, ALT 38, Alkaline Phosphatase 108. Patient meets order parameters for today's treatment.    Do note Bnoita Quintana's note after last visit last week, that per Dr Walker, she was to resume Cytoxan and Bonita had called pt to update her to do. Pt notes she took as directed. Bonita's note also states patient should take on day 22, which is today. Labs WDL for patient/per treatment plan. Instructed to take as no new concerns raised today on assessment/review of labs.     Patient denies questions or concerns regarding injection and/or medication(s) being administered.    Independent dose check completed with Barron Booth RN.    Velcade injected SQ into left upper outer arm at 45 degree angle  per protocol rotating sites.     Injection administered per protocol. Patient tolerated infusion without incident, no signs or symptoms of adverse reaction noted. Patient denies pain or discomfort.     Patient did not bring her Cytoxan and Dexamethasone, like she has in the past, but verbalizes understanding of how to take once she gets home. Encouraged she call if she gets home and has any questions.     Covered with a sterile bandage. Pt instructed to leave bandage intact for a minimum of one hour, and to call with questions or concerns. Copy of appointments, discharge instructions, and after visit summary (AVS) provided to patient. Patient states understanding, discharged ambulatory.

## 2019-12-18 NOTE — PROGRESS NOTES
Oncology Follow-up Visit:  December 20, 2019    Reason for Visit:  Patient presents with:  RECHECK: multiple myeloma     Nursing Note and documentation reviewed: yes    HPI:  This is a 69-year-old female patient who presents to the oncology clinic today for evaluation prior to receiving cycle 4 chemotherapy (to be initiated next week) for Stage II-RISS IgG multiple myeloma diagnosed June 2019.  She progressed on Velcade and dexamethasone and is now receiving CyBorD with decreased dose of cytoxan related to LFT elevation.    She presents to the clinic today stating she feels she is doing pretty well.  She does experience some fatigue which waxes and wanes and does rest when she needs to.  She resumed the Cytoxan at the lower dose of 3 tablets starting with day 15 therapy and did take it also this week-day 22.  She has no difficulty with Cytoxan and states she is trying to keep up on her fluid intake.    Oncologic History:     12/31/2018   patient presented to the emergency room with vertigo and fatigue.  CT scan of the head was negative and subsequent stress test was negative.  5/3/2019  She was seen by her PCP who ordered lab work and noted a total protein of 12.9.  SPEP at that time showed an M spike of 6.2 with a large monoclonal protein seen in the gamma fraction.  Urine immunofixation showed a possible small protein band in the gamma fraction  5/31/2019  she was evaluated by Dr. Walker with Medical Oncology with plan to rule out myeloma and obtain bone marrow aspiration biopsy as well as a metastatic bone survey along with additional labwork  6/18/2019 she underwent bone marrow aspiration and biopsy  6/24/2019  She was seen again by Dr. Walker and CBC showed a hemoglobin of 9.3, M spike 7.3 with monoclonal IgG immunoglobulin of kappa light chain type; serum viscosity was 2.9; quantitative immunoglobulins showed an IgG of 8160, beta-2 microglobulin was 5.8, BUN was 21 with creatinine is 0.8 and total protein  was 13.7.  Quantitative kappa/lambda free light chains showed an elevated ratio of 17.0 bone marrow aspiration biopsy showed plasma cell myeloma with approximately 80% plasma cells.  Immunofixation showed IgG kappa and flow cytometry revealed kappa monotypic plasma cells consistent with clonal plasma cell neoplasm and FISH panel was pending at that time.  It was felt she had at least stage II disease based on her beta-2 microglobulin and anemia.  Plan was to treat with Velcade 1.3 mg per metered squared days 1, 4, 8 and 11/Decadron 40 mg on days 1, 8 and 15 initiation of Revlimid with the second cycle at 25 mg daily days 1 through 14.  Plan was to also obtain an MRI of the lumbar spine to rule out lytic lesion at L3.  She was initiated on Zovirax 400 mg p.o. twice daily.  6/25/2019  1st cycle of chemotherapy initiated  7/1/2019 note in chart regarding patient's large co-pay for the Revlimid and no plan at this point to initiate Revlimid and treat only with Velcade and Decadron per Dr. Walker  7/11/2019  MRI lumbar spine shows a pathologic superior endplate compression fracture at L3 without evidence of retropulsed fragment and innumerable enhancing lesions throughout the lumbar spine consistent with history of multiple myeloma.  9/10/2019  Increasing M-spike and kappa lambda ratio  10/1/2019  Initiation of CyBorD     Current Chemo Regime/TX:  Velcade 1.5mg.m2/cyclophosphamide 150mg every 7 days on days 1,8,15 and 22/decadron 40mg days 1,8,15,22       **Zometa 4mg every 3 months  Current Cycle: 4  # of completed cycles:  3     Previous treatment:   Velcade 1.3 mg per metered squared on days 1, 4, 8 and 11 with Decadron 40 mg on days 1, 8 and 15 x 4 cycles     Past Medical History:   Diagnosis Date     Arthritis      Depressive disorder      Essential hypertension 10/1/2015     Major depressive disorder, recurrent episode, mild (H) 10/1/2015     Mixed hyperlipidemia 10/1/2015     Multiple myeloma not having achieved  "remission (H) 6/24/2019     Other specified disorders of bladder 07/09/2012    Irritable Bladder     Seasonal allergies 10/1/2015     Unspecified essential hypertension 03/19/2007     Unspecified sinusitis (chronic) 09/05/2007       Past Surgical History:   Procedure Laterality Date     APPENDECTOMY       BONE MARROW BIOPSY, BONE SPECIMEN, NEEDLE/TROCAR N/A 6/18/2019    Procedure: BONE MARROW BIOPSY;  Surgeon: Maciej Sanz MD;  Location: [facility]     CHOLECYSTECTOMY       COLONOSCOPY  07-    repeat 10 years     COLONOSCOPY N/A 12/30/2016    Procedure: COLONOSCOPY;  Surgeon: Bhaskar Franklin DO;  Location: [facility]     SINUS SURGERY       TUBAL STERILIZATION         Family History   Problem Relation Age of Onset     Breast Cancer Mother 66        Cause of Death     Parkinsonism Father         \"Possible\"     Coronary Artery Disease Father      Pacemaker Father      Thyroid Disease Daughter      Diabetes No family hx of      Hypertension No family hx of      Hyperlipidemia No family hx of      Cerebrovascular Disease No family hx of      Colon Cancer No family hx of      Prostate Cancer No family hx of      Genetic Disorder No family hx of      Asthma No family hx of      Anesthesia Reaction No family hx of        Social History     Socioeconomic History     Marital status:      Spouse name: Not on file     Number of children: Not on file     Years of education: Not on file     Highest education level: Not on file   Occupational History     Occupation: Financial     Comment:  - (FT)   Social Needs     Financial resource strain: Not on file     Food insecurity:     Worry: Not on file     Inability: Not on file     Transportation needs:     Medical: Not on file     Non-medical: Not on file   Tobacco Use     Smoking status: Never Smoker     Smokeless tobacco: Never Used     Tobacco comment: Tried to Quit (YES); QUIT in 1971; Passive Exposure (NO)   Substance and Sexual Activity     Alcohol " use: Yes     Comment: RARELY     Drug use: No     Sexual activity: Yes     Partners: Male     Birth control/protection: None   Lifestyle     Physical activity:     Days per week: Not on file     Minutes per session: Not on file     Stress: Not on file   Relationships     Social connections:     Talks on phone: Not on file     Gets together: Not on file     Attends Congregational service: Not on file     Active member of club or organization: Not on file     Attends meetings of clubs or organizations: Not on file     Relationship status: Not on file     Intimate partner violence:     Fear of current or ex partner: Not on file     Emotionally abused: Not on file     Physically abused: Not on file     Forced sexual activity: Not on file   Other Topics Concern      Service Not Asked     Blood Transfusions Not Asked     Caffeine Concern Yes     Comment: Coffee >6 cups daily     Occupational Exposure Not Asked     Hobby Hazards Not Asked     Sleep Concern Not Asked     Stress Concern Not Asked     Weight Concern Not Asked     Special Diet Not Asked     Back Care Not Asked     Exercise Not Asked     Bike Helmet Not Asked     Seat Belt Not Asked     Self-Exams Not Asked     Parent/sibling w/ CABG, MI or angioplasty before 65F 55M? No   Social History Narrative     Not on file       Current Outpatient Medications   Medication     acyclovir (ZOVIRAX) 400 MG tablet     atorvastatin (LIPITOR) 10 MG tablet     Bortezomib (VELCADE IJ)     cyclophosphamide (CYTOXAN) 50 MG capsule     dexamethasone (DECADRON) 4 MG tablet     EPINEPHrine (EPIPEN) 0.3 MG/0.3ML injection     fluticasone (FLONASE) 50 MCG/ACT nasal spray     LORazepam (ATIVAN) 0.5 MG tablet     losartan (COZAAR) 50 MG tablet     sertraline (ZOLOFT) 50 MG tablet     cyclophosphamide (CYTOXAN) 50 MG capsule     ondansetron (ZOFRAN) 8 MG tablet     prochlorperazine (COMPAZINE) 10 MG tablet     No current facility-administered medications for this visit.      "    Allergies   Allergen Reactions     Lisinopril Cough     Phenylephrine Hcl Other (See Comments)     **Entex  HEADACHE (SEVERE)     Phenylpropanolamine Other (See Comments)     **Entex  HEADACHE (SEVERE)     Pseudoephedrine Tannate Other (See Comments)     **Entex  HEADACHE (SEVERE)     Levofloxacin Rash     **Levaquin     Moxifloxacin Hcl [Avelox] Rash       Review Of Systems:  Constitutional:    denies fever, weight changes, chills, and night sweats.  Eyes:    denies double vision; may get blurry at times  Ears/Nose/Throat:   denies ear pain but feel plugged at times still, nose problems, difficulty swallowing  Respiratory:   denies shortness of breath, cough   Skin:   denies rash, lesions  Cardiovascular:   denies chest pain, palpitations, edema  Gastrointestinal:   denies abdominal pain, bloating, nausea, early satiety; no change in bowel habits or blood in stool  Genitourinary:   denies difficulty with urination, blood in urine  Musculoskeletal:    denies new muscle pain, bone pain  Neurologic:   denies lightheadedness, headaches, numbness or tingling  Psychiatric:   Feels she is doing okay-some anxiety but managing  Hematologic/Lymphatic/Immunologic:   denies easy bruising, easy bleeding, lumps or bumps noted  Endocrine:   Denies increased thirst      ECOG Performance Status: 1    Physical Exam:  /78   Pulse 81   Temp 98.2  F (36.8  C) (Tympanic)   Ht 1.6 m (5' 3\")   Wt 69.5 kg (153 lb 3.5 oz)   SpO2 98%   BMI 27.14 kg/m      GENERAL APPEARANCE: Healthy, alert and in no acute distress.  HEENT: Normocephalic, Sclerae anicteric. Oropharynx without ulcers, lesions, or thrush.  NECK:   No asymmetry or masses, no thyromegaly.  LYMPHATICS: No palpable cervical, supraclavicular, axillary, or inguinal nodes   RESP: Lungs clear to auscultation bilaterally, respirations regular and easy  CARDIOVASCULAR: Regular rate and rhythm. Normal S1, S2; no murmur, gallop, or rub.  ABDOMEN: Soft, nontender. Bowel " sounds auscultated all 4 quadrants. No palpable organomegaly or masses.  MUSCULOSKELETAL: Extremities without gross deformities noted. No edema of bilateral lower extremities.  SKIN: No suspicious lesions or rashes.  NEURO: Alert and oriented x 3.  Gait steady.  PSYCHIATRIC: Mentation and affect appear normal.  Mood appropriate.    Laboratory:  Results for WILLIAM WILDER (MRN [id number]) as of 12/20/2019 10:49   Ref. Range 12/17/2019 12:35   Sodium Latest Ref Range: 133 - 144 mmol/L 135   Potassium Latest Ref Range: 3.4 - 5.3 mmol/L 3.6   Chloride Latest Ref Range: 94 - 109 mmol/L 103   Carbon Dioxide Latest Ref Range: 20 - 32 mmol/L 29   Urea Nitrogen Latest Ref Range: 7 - 30 mg/dL 16   Creatinine Latest Ref Range: 0.52 - 1.04 mg/dL 0.60   GFR Estimate Latest Ref Range: >60 mL/min/1.73_m2 >90   GFR Estimate If Black Latest Ref Range: >60 mL/min/1.73_m2 >90   Calcium Latest Ref Range: 8.5 - 10.1 mg/dL 8.8   Anion Gap Latest Ref Range: 3 - 14 mmol/L 3   Albumin Latest Ref Range: 3.4 - 5.0 g/dL 3.2 (L)   Protein Total Latest Ref Range: 6.8 - 8.8 g/dL 9.2 (H)   Bilirubin Total Latest Ref Range: 0.2 - 1.3 mg/dL 0.3   Alkaline Phosphatase Latest Ref Range: 40 - 150 U/L 108   ALT Latest Ref Range: 0 - 50 U/L 38   AST Latest Ref Range: 0 - 45 U/L 20   Bilirubin Direct Latest Ref Range: 0.0 - 0.2 mg/dL <0.1   Glucose Latest Ref Range: 70 - 99 mg/dL 93   WBC Latest Ref Range: 4.0 - 11.0 10e9/L 5.0   Hemoglobin Latest Ref Range: 11.7 - 15.7 g/dL 10.4 (L)   Hematocrit Latest Ref Range: 35.0 - 47.0 % 30.8 (L)   Platelet Count Latest Ref Range: 150 - 450 10e9/L 192   RBC Count Latest Ref Range: 3.8 - 5.2 10e12/L 3.33 (L)   MCV Latest Ref Range: 78 - 100 fl 93   MCH Latest Ref Range: 26.5 - 33.0 pg 31.2   MCHC Latest Ref Range: 31.5 - 36.5 g/dL 33.8   RDW Latest Ref Range: 10.0 - 15.0 % 14.9   Diff Method Unknown Automated Method   % Neutrophils Latest Units: % 54.3   % Lymphocytes Latest Units: % 26.4   % Monocytes Latest  Units: % 15.3   % Eosinophils Latest Units: % 2.4   % Basophils Latest Units: % 0.2   % Immature Granulocytes Latest Units: % 1.4   Nucleated RBCs Latest Ref Range: 0 /100 0   Absolute Neutrophil Latest Ref Range: 1.6 - 8.3 10e9/L 2.7   Absolute Lymphocytes Latest Ref Range: 0.8 - 5.3 10e9/L 1.3   Absolute Monocytes Latest Ref Range: 0.0 - 1.3 10e9/L 0.8   Absolute Eosinophils Latest Ref Range: 0.0 - 0.7 10e9/L 0.1   Absolute Basophils Latest Ref Range: 0.0 - 0.2 10e9/L 0.0   Abs Immature Granulocytes Latest Ref Range: 0 - 0.4 10e9/L 0.1   Absolute Nucleated RBC Unknown 0.0   Albumin Fraction Latest Ref Range: 3.7 - 5.1 g/dL 3.9   Alpha 1 Fraction Latest Ref Range: 0.2 - 0.4 g/dL 0.3   Alpha 2 Fraction Latest Ref Range: 0.5 - 0.9 g/dL 0.8   Beta Fraction Latest Ref Range: 0.6 - 1.0 g/dL 0.6   ELP Interpretation: Unknown Monoclonal protei...   Gamma Fraction Latest Ref Range: 0.7 - 1.6 g/dL 3.1 (H)   IGA Latest Ref Range: 70 - 380 mg/dL 7 (L)   IGG Latest Ref Range: 695 - 1,620 mg/dL 3,858 (H)   IGM Latest Ref Range: 60 - 265 mg/dL 12 (L)   Immunofixation ELP Unknown (Note)   Kappa Free Lt Chain Latest Ref Range: 0.33 - 1.94 mg/dL 0.91   Kappa Lambda Ratio Latest Ref Range: 0.26 - 1.65  2.94 (H)   Lambda Free Lt Chain Latest Ref Range: 0.57 - 2.63 mg/dL 0.31 (L)   Monoclonal Peak Latest Ref Range: 0.0 g/dL 2.9 (H)     Imaging Studies:  None completed for today's visit      ASSESSMENT/PLAN:    #1 Multiple myeloma:   IgG kappa multiple myeloma with 80% involvement of the bone marrow diagnosed June 2019 initially treated with Velcade and Decadron and received 4 cycles with increasing M-spike and kappa/lambda ratio.  Treatment changed to CyBorD on 10/1/2019 with elevating LFT's felt to be related tot he Cytoxan so this was decreased with cycle 3 day 8.  M-spike continues to drop.  Will continue the same and she will return Monday 12/23 to initiate cycle 4 day 1.  I will see her back prior to cycle 5 with myeloma labs the  week prior.    #2 myeloma lytic lesion:  Will be due for Zometa again in [facility].  Will discuss further scans with Dr. Walker.     #3 elevated liver functions:  Normalized.  Will watch throughout therapy.    I encouraged patient to call with any questions or concerns.       Barbie Quintana, NP  APRN, FNP-BC, AOCNP

## 2019-12-19 LAB
ALBUMIN SERPL ELPH-MCNC: 3.9 G/DL (ref 3.7–5.1)
ALPHA1 GLOB SERPL ELPH-MCNC: 0.3 G/DL (ref 0.2–0.4)
ALPHA2 GLOB SERPL ELPH-MCNC: 0.8 G/DL (ref 0.5–0.9)
B-GLOBULIN SERPL ELPH-MCNC: 0.6 G/DL (ref 0.6–1)
GAMMA GLOB SERPL ELPH-MCNC: 3.1 G/DL (ref 0.7–1.6)
IGA SERPL-MCNC: 7 MG/DL (ref 70–380)
IGG SERPL-MCNC: 3858 MG/DL (ref 695–1620)
IGM SERPL-MCNC: 12 MG/DL (ref 60–265)
KAPPA LC UR-MCNC: 0.91 MG/DL (ref 0.33–1.94)
KAPPA LC/LAMBDA SER: 2.94 {RATIO} (ref 0.26–1.65)
LAMBDA LC SERPL-MCNC: 0.31 MG/DL (ref 0.57–2.63)
M PROTEIN SERPL ELPH-MCNC: 2.9 G/DL
PROT PATTERN SERPL ELPH-IMP: ABNORMAL
PROT PATTERN SERPL IFE-IMP: NORMAL

## 2019-12-20 ENCOUNTER — ONCOLOGY VISIT (OUTPATIENT)
Dept: ONCOLOGY | Facility: OTHER | Age: 69
End: 2019-12-20
Attending: NURSE PRACTITIONER
Payer: COMMERCIAL

## 2019-12-20 VITALS
HEART RATE: 81 BPM | HEIGHT: 63 IN | TEMPERATURE: 98.2 F | WEIGHT: 153.22 LBS | OXYGEN SATURATION: 98 % | SYSTOLIC BLOOD PRESSURE: 108 MMHG | DIASTOLIC BLOOD PRESSURE: 78 MMHG | BODY MASS INDEX: 27.15 KG/M2

## 2019-12-20 DIAGNOSIS — R79.89 ELEVATED LFTS: ICD-10-CM

## 2019-12-20 DIAGNOSIS — C90.00 MULTIPLE MYELOMA NOT HAVING ACHIEVED REMISSION (H): Primary | ICD-10-CM

## 2019-12-20 DIAGNOSIS — M89.9 BONE LESION: ICD-10-CM

## 2019-12-20 PROCEDURE — 99213 OFFICE O/P EST LOW 20 MIN: CPT | Performed by: NURSE PRACTITIONER

## 2019-12-20 PROCEDURE — G0463 HOSPITAL OUTPT CLINIC VISIT: HCPCS

## 2019-12-20 RX ORDER — METHYLPREDNISOLONE SODIUM SUCCINATE 125 MG/2ML
125 INJECTION, POWDER, LYOPHILIZED, FOR SOLUTION INTRAMUSCULAR; INTRAVENOUS
Status: CANCELLED
Start: 2020-01-13

## 2019-12-20 RX ORDER — ALBUTEROL SULFATE 0.83 MG/ML
2.5 SOLUTION RESPIRATORY (INHALATION)
Status: CANCELLED | OUTPATIENT
Start: 2020-01-06

## 2019-12-20 RX ORDER — SODIUM CHLORIDE 9 MG/ML
1000 INJECTION, SOLUTION INTRAVENOUS CONTINUOUS PRN
Status: CANCELLED
Start: 2020-01-06

## 2019-12-20 RX ORDER — MEPERIDINE HYDROCHLORIDE 25 MG/ML
25 INJECTION INTRAMUSCULAR; INTRAVENOUS; SUBCUTANEOUS EVERY 30 MIN PRN
Status: CANCELLED | OUTPATIENT
Start: 2019-12-30

## 2019-12-20 RX ORDER — ONDANSETRON 8 MG/1
8 TABLET, FILM COATED ORAL ONCE
Status: CANCELLED
Start: 2019-12-30

## 2019-12-20 RX ORDER — ALBUTEROL SULFATE 90 UG/1
1-2 AEROSOL, METERED RESPIRATORY (INHALATION)
Status: CANCELLED
Start: 2019-12-30

## 2019-12-20 RX ORDER — ALBUTEROL SULFATE 90 UG/1
1-2 AEROSOL, METERED RESPIRATORY (INHALATION)
Status: CANCELLED
Start: 2019-12-23

## 2019-12-20 RX ORDER — METHYLPREDNISOLONE SODIUM SUCCINATE 125 MG/2ML
125 INJECTION, POWDER, LYOPHILIZED, FOR SOLUTION INTRAMUSCULAR; INTRAVENOUS
Status: CANCELLED
Start: 2019-12-23

## 2019-12-20 RX ORDER — ONDANSETRON 8 MG/1
8 TABLET, FILM COATED ORAL ONCE
Status: CANCELLED
Start: 2020-01-13

## 2019-12-20 RX ORDER — ALBUTEROL SULFATE 90 UG/1
1-2 AEROSOL, METERED RESPIRATORY (INHALATION)
Status: CANCELLED
Start: 2020-01-06

## 2019-12-20 RX ORDER — ALBUTEROL SULFATE 0.83 MG/ML
2.5 SOLUTION RESPIRATORY (INHALATION)
Status: CANCELLED | OUTPATIENT
Start: 2020-01-13

## 2019-12-20 RX ORDER — LORAZEPAM 2 MG/ML
0.5 INJECTION INTRAMUSCULAR EVERY 4 HOURS PRN
Status: CANCELLED
Start: 2020-01-13

## 2019-12-20 RX ORDER — EPINEPHRINE 1 MG/ML
0.3 INJECTION, SOLUTION, CONCENTRATE INTRAVENOUS EVERY 5 MIN PRN
Status: CANCELLED | OUTPATIENT
Start: 2020-01-06

## 2019-12-20 RX ORDER — LORAZEPAM 2 MG/ML
0.5 INJECTION INTRAMUSCULAR EVERY 4 HOURS PRN
Status: CANCELLED
Start: 2019-12-23

## 2019-12-20 RX ORDER — NALOXONE HYDROCHLORIDE 0.4 MG/ML
.1-.4 INJECTION, SOLUTION INTRAMUSCULAR; INTRAVENOUS; SUBCUTANEOUS
Status: CANCELLED | OUTPATIENT
Start: 2020-01-13

## 2019-12-20 RX ORDER — MEPERIDINE HYDROCHLORIDE 25 MG/ML
25 INJECTION INTRAMUSCULAR; INTRAVENOUS; SUBCUTANEOUS EVERY 30 MIN PRN
Status: CANCELLED | OUTPATIENT
Start: 2020-01-13

## 2019-12-20 RX ORDER — SODIUM CHLORIDE 9 MG/ML
1000 INJECTION, SOLUTION INTRAVENOUS CONTINUOUS PRN
Status: CANCELLED
Start: 2019-12-23

## 2019-12-20 RX ORDER — NALOXONE HYDROCHLORIDE 0.4 MG/ML
.1-.4 INJECTION, SOLUTION INTRAMUSCULAR; INTRAVENOUS; SUBCUTANEOUS
Status: CANCELLED | OUTPATIENT
Start: 2019-12-30

## 2019-12-20 RX ORDER — ALBUTEROL SULFATE 0.83 MG/ML
2.5 SOLUTION RESPIRATORY (INHALATION)
Status: CANCELLED | OUTPATIENT
Start: 2019-12-23

## 2019-12-20 RX ORDER — EPINEPHRINE 0.3 MG/.3ML
0.3 INJECTION SUBCUTANEOUS EVERY 5 MIN PRN
Status: CANCELLED | OUTPATIENT
Start: 2020-01-13

## 2019-12-20 RX ORDER — METHYLPREDNISOLONE SODIUM SUCCINATE 125 MG/2ML
125 INJECTION, POWDER, LYOPHILIZED, FOR SOLUTION INTRAMUSCULAR; INTRAVENOUS
Status: CANCELLED
Start: 2019-12-30

## 2019-12-20 RX ORDER — MEPERIDINE HYDROCHLORIDE 25 MG/ML
25 INJECTION INTRAMUSCULAR; INTRAVENOUS; SUBCUTANEOUS EVERY 30 MIN PRN
Status: CANCELLED | OUTPATIENT
Start: 2019-12-23

## 2019-12-20 RX ORDER — DIPHENHYDRAMINE HYDROCHLORIDE 50 MG/ML
50 INJECTION INTRAMUSCULAR; INTRAVENOUS
Status: CANCELLED
Start: 2019-12-23

## 2019-12-20 RX ORDER — EPINEPHRINE 0.3 MG/.3ML
0.3 INJECTION SUBCUTANEOUS EVERY 5 MIN PRN
Status: CANCELLED | OUTPATIENT
Start: 2019-12-30

## 2019-12-20 RX ORDER — EPINEPHRINE 0.3 MG/.3ML
0.3 INJECTION SUBCUTANEOUS EVERY 5 MIN PRN
Status: CANCELLED | OUTPATIENT
Start: 2020-01-06

## 2019-12-20 RX ORDER — DIPHENHYDRAMINE HYDROCHLORIDE 50 MG/ML
50 INJECTION INTRAMUSCULAR; INTRAVENOUS
Status: CANCELLED
Start: 2019-12-30

## 2019-12-20 RX ORDER — DIPHENHYDRAMINE HYDROCHLORIDE 50 MG/ML
50 INJECTION INTRAMUSCULAR; INTRAVENOUS
Status: CANCELLED
Start: 2020-01-13

## 2019-12-20 RX ORDER — EPINEPHRINE 1 MG/ML
0.3 INJECTION, SOLUTION, CONCENTRATE INTRAVENOUS EVERY 5 MIN PRN
Status: CANCELLED | OUTPATIENT
Start: 2020-01-13

## 2019-12-20 RX ORDER — EPINEPHRINE 1 MG/ML
0.3 INJECTION, SOLUTION, CONCENTRATE INTRAVENOUS EVERY 5 MIN PRN
Status: CANCELLED | OUTPATIENT
Start: 2019-12-23

## 2019-12-20 RX ORDER — ALBUTEROL SULFATE 0.83 MG/ML
2.5 SOLUTION RESPIRATORY (INHALATION)
Status: CANCELLED | OUTPATIENT
Start: 2019-12-30

## 2019-12-20 RX ORDER — ONDANSETRON 8 MG/1
8 TABLET, FILM COATED ORAL ONCE
Status: CANCELLED
Start: 2019-12-23

## 2019-12-20 RX ORDER — DIPHENHYDRAMINE HYDROCHLORIDE 50 MG/ML
50 INJECTION INTRAMUSCULAR; INTRAVENOUS
Status: CANCELLED
Start: 2020-01-06

## 2019-12-20 RX ORDER — SODIUM CHLORIDE 9 MG/ML
1000 INJECTION, SOLUTION INTRAVENOUS CONTINUOUS PRN
Status: CANCELLED
Start: 2019-12-30

## 2019-12-20 RX ORDER — ONDANSETRON 8 MG/1
8 TABLET, FILM COATED ORAL ONCE
Status: CANCELLED
Start: 2020-01-06

## 2019-12-20 RX ORDER — EPINEPHRINE 1 MG/ML
0.3 INJECTION, SOLUTION, CONCENTRATE INTRAVENOUS EVERY 5 MIN PRN
Status: CANCELLED | OUTPATIENT
Start: 2019-12-30

## 2019-12-20 RX ORDER — NALOXONE HYDROCHLORIDE 0.4 MG/ML
.1-.4 INJECTION, SOLUTION INTRAMUSCULAR; INTRAVENOUS; SUBCUTANEOUS
Status: CANCELLED | OUTPATIENT
Start: 2019-12-23

## 2019-12-20 RX ORDER — SODIUM CHLORIDE 9 MG/ML
1000 INJECTION, SOLUTION INTRAVENOUS CONTINUOUS PRN
Status: CANCELLED
Start: 2020-01-13

## 2019-12-20 RX ORDER — ALBUTEROL SULFATE 90 UG/1
1-2 AEROSOL, METERED RESPIRATORY (INHALATION)
Status: CANCELLED
Start: 2020-01-13

## 2019-12-20 RX ORDER — LORAZEPAM 2 MG/ML
0.5 INJECTION INTRAMUSCULAR EVERY 4 HOURS PRN
Status: CANCELLED
Start: 2020-01-06

## 2019-12-20 RX ORDER — EPINEPHRINE 0.3 MG/.3ML
0.3 INJECTION SUBCUTANEOUS EVERY 5 MIN PRN
Status: CANCELLED | OUTPATIENT
Start: 2019-12-23

## 2019-12-20 RX ORDER — LORAZEPAM 2 MG/ML
0.5 INJECTION INTRAMUSCULAR EVERY 4 HOURS PRN
Status: CANCELLED
Start: 2019-12-30

## 2019-12-20 RX ORDER — METHYLPREDNISOLONE SODIUM SUCCINATE 125 MG/2ML
125 INJECTION, POWDER, LYOPHILIZED, FOR SOLUTION INTRAMUSCULAR; INTRAVENOUS
Status: CANCELLED
Start: 2020-01-06

## 2019-12-20 RX ORDER — MEPERIDINE HYDROCHLORIDE 25 MG/ML
25 INJECTION INTRAMUSCULAR; INTRAVENOUS; SUBCUTANEOUS EVERY 30 MIN PRN
Status: CANCELLED | OUTPATIENT
Start: 2020-01-06

## 2019-12-20 RX ORDER — NALOXONE HYDROCHLORIDE 0.4 MG/ML
.1-.4 INJECTION, SOLUTION INTRAMUSCULAR; INTRAVENOUS; SUBCUTANEOUS
Status: CANCELLED | OUTPATIENT
Start: 2020-01-06

## 2019-12-20 ASSESSMENT — PAIN SCALES - GENERAL: PAINLEVEL: NO PAIN (0)

## 2019-12-20 ASSESSMENT — PATIENT HEALTH QUESTIONNAIRE - PHQ9: SUM OF ALL RESPONSES TO PHQ QUESTIONS 1-9: 2

## 2019-12-20 ASSESSMENT — MIFFLIN-ST. JEOR: SCORE: 1179.13

## 2019-12-20 NOTE — NURSING NOTE
"Chief Complaint   Patient presents with     RECHECK     multiple myeloma       Initial /78   Pulse 81   Temp 98.2  F (36.8  C) (Tympanic)   Ht 1.6 m (5' 3\")   Wt 69.5 kg (153 lb 3.5 oz)   SpO2 98%   BMI 27.14 kg/m   Estimated body mass index is 27.14 kg/m  as calculated from the following:    Height as of this encounter: 1.6 m (5' 3\").    Weight as of this encounter: 69.5 kg (153 lb 3.5 oz).  Medication Reconciliation: complete   Immunizations and advance directives status reviewed. Pain scale 0 , PHQ-9 2.          Elena Holcomb LPN  "

## 2019-12-20 NOTE — PATIENT INSTRUCTIONS
We would like to see you back per your schedule.     When you are in need of a refill, please call your pharmacy and they will send us a request.     If you have any questions please call [phone]    Other instructions:  none

## 2019-12-23 ENCOUNTER — INFUSION THERAPY VISIT (OUTPATIENT)
Dept: INFUSION THERAPY | Facility: OTHER | Age: 69
End: 2019-12-23
Attending: INTERNAL MEDICINE
Payer: MEDICARE

## 2019-12-23 ENCOUNTER — APPOINTMENT (OUTPATIENT)
Dept: LAB | Facility: OTHER | Age: 69
End: 2019-12-23
Attending: INTERNAL MEDICINE
Payer: MEDICARE

## 2019-12-23 VITALS
RESPIRATION RATE: 18 BRPM | TEMPERATURE: 98 F | WEIGHT: 151.68 LBS | DIASTOLIC BLOOD PRESSURE: 63 MMHG | OXYGEN SATURATION: 99 % | HEIGHT: 63 IN | SYSTOLIC BLOOD PRESSURE: 122 MMHG | HEART RATE: 87 BPM | BODY MASS INDEX: 26.88 KG/M2

## 2019-12-23 DIAGNOSIS — C90.00 MULTIPLE MYELOMA NOT HAVING ACHIEVED REMISSION (H): Primary | ICD-10-CM

## 2019-12-23 DIAGNOSIS — C90.00 MULTIPLE MYELOMA NOT HAVING ACHIEVED REMISSION (H): ICD-10-CM

## 2019-12-23 LAB
ALBUMIN SERPL-MCNC: 3.3 G/DL (ref 3.4–5)
ALP SERPL-CCNC: 105 U/L (ref 40–150)
ALT SERPL W P-5'-P-CCNC: 51 U/L (ref 0–50)
ANION GAP SERPL CALCULATED.3IONS-SCNC: 5 MMOL/L (ref 3–14)
AST SERPL W P-5'-P-CCNC: 38 U/L (ref 0–45)
BASOPHILS # BLD AUTO: 0 10E9/L (ref 0–0.2)
BASOPHILS NFR BLD AUTO: 0.2 %
BILIRUB SERPL-MCNC: 0.2 MG/DL (ref 0.2–1.3)
BUN SERPL-MCNC: 19 MG/DL (ref 7–30)
CALCIUM SERPL-MCNC: 8.9 MG/DL (ref 8.5–10.1)
CHLORIDE SERPL-SCNC: 104 MMOL/L (ref 94–109)
CO2 SERPL-SCNC: 27 MMOL/L (ref 20–32)
CREAT SERPL-MCNC: 0.64 MG/DL (ref 0.52–1.04)
DIFFERENTIAL METHOD BLD: ABNORMAL
EOSINOPHIL # BLD AUTO: 0.1 10E9/L (ref 0–0.7)
EOSINOPHIL NFR BLD AUTO: 1.2 %
ERYTHROCYTE [DISTWIDTH] IN BLOOD BY AUTOMATED COUNT: 15 % (ref 10–15)
GFR SERPL CREATININE-BSD FRML MDRD: 90 ML/MIN/{1.73_M2}
GLUCOSE SERPL-MCNC: 117 MG/DL (ref 70–99)
HCT VFR BLD AUTO: 32.3 % (ref 35–47)
HGB BLD-MCNC: 10.9 G/DL (ref 11.7–15.7)
IMM GRANULOCYTES # BLD: 0.2 10E9/L (ref 0–0.4)
IMM GRANULOCYTES NFR BLD: 3.8 %
LYMPHOCYTES # BLD AUTO: 1.5 10E9/L (ref 0.8–5.3)
LYMPHOCYTES NFR BLD AUTO: 25.4 %
MCH RBC QN AUTO: 31.4 PG (ref 26.5–33)
MCHC RBC AUTO-ENTMCNC: 33.7 G/DL (ref 31.5–36.5)
MCV RBC AUTO: 93 FL (ref 78–100)
MONOCYTES # BLD AUTO: 1.1 10E9/L (ref 0–1.3)
MONOCYTES NFR BLD AUTO: 18.1 %
NEUTROPHILS # BLD AUTO: 3 10E9/L (ref 1.6–8.3)
NEUTROPHILS NFR BLD AUTO: 51.3 %
NRBC # BLD AUTO: 0 10*3/UL
NRBC BLD AUTO-RTO: 0 /100
PLATELET # BLD AUTO: 185 10E9/L (ref 150–450)
POTASSIUM SERPL-SCNC: 3.6 MMOL/L (ref 3.4–5.3)
PROT SERPL-MCNC: 9.2 G/DL (ref 6.8–8.8)
RBC # BLD AUTO: 3.47 10E12/L (ref 3.8–5.2)
SODIUM SERPL-SCNC: 136 MMOL/L (ref 133–144)
WBC # BLD AUTO: 5.8 10E9/L (ref 4–11)

## 2019-12-23 PROCEDURE — 85025 COMPLETE CBC W/AUTO DIFF WBC: CPT | Mod: ZL | Performed by: NURSE PRACTITIONER

## 2019-12-23 PROCEDURE — 96372 THER/PROPH/DIAG INJ SC/IM: CPT | Mod: 59

## 2019-12-23 PROCEDURE — 96401 CHEMO ANTI-NEOPL SQ/IM: CPT

## 2019-12-23 PROCEDURE — 80053 COMPREHEN METABOLIC PANEL: CPT | Mod: ZL | Performed by: NURSE PRACTITIONER

## 2019-12-23 PROCEDURE — 36415 COLL VENOUS BLD VENIPUNCTURE: CPT | Mod: ZL | Performed by: NURSE PRACTITIONER

## 2019-12-23 PROCEDURE — 25000128 H RX IP 250 OP 636: Performed by: NURSE PRACTITIONER

## 2019-12-23 RX ORDER — ONDANSETRON 4 MG/1
8 TABLET, FILM COATED ORAL ONCE
Status: COMPLETED | OUTPATIENT
Start: 2019-12-23 | End: 2019-12-23

## 2019-12-23 RX ADMIN — ONDANSETRON HYDROCHLORIDE 8 MG: 4 TABLET, FILM COATED ORAL at 14:47

## 2019-12-23 RX ADMIN — BORTEZOMIB 2.6 MG: 3.5 INJECTION, POWDER, LYOPHILIZED, FOR SOLUTION INTRAVENOUS; SUBCUTANEOUS at 15:23

## 2019-12-23 ASSESSMENT — MIFFLIN-ST. JEOR: SCORE: 1172

## 2019-12-23 ASSESSMENT — PAIN SCALES - GENERAL: PAINLEVEL: NO PAIN (0)

## 2019-12-23 NOTE — PROGRESS NOTES
Patient is a 69 year old female her today for injection of Velcade per order of . Patient meets parameters for today's infusion. Patient identified with two identifiers, order verified, and verbal consent for today's infusion obtained from patient. Written consent for treatment is on file and valid.    Today's lab values: WBC: 5.8, HGB: 10.9, PLT: 185  ANC: 3.0. Patient meets order parameters for today's treatment.    Patient denies questions or concerns regarding injection and/or medication(s) being administered.    Independent dose check Barron Booth RN.    Velcade injected SQ into right upper outer arm 45 degree angle  per protocol rotating sites.     Injection administered per protocol. Patient tolerated infusion without incident, no signs or symptoms of adverse reaction noted. Patient denies pain or discomfort.     Covered with a sterile bandage. Pt instructed to leave bandage intact for a minimum of one hour, and to call with questions or concerns. Patient states understanding, discharged ambulatory.

## 2019-12-23 NOTE — TELEPHONE ENCOUNTER
Acyclovir      Last Written Prescription Date:  06/24/19  Last Fill Quantity: 60,   # refills: 5  Last Office Visit: 11/14/19  Future Office visit:    Next 5 appointments (look out 90 days)    Jan 21, 2020 12:45 PM CST  (Arrive by 12:30 PM)  Return Visit with Barbie Quintana NP  [facility] ([facility] [address] [place] [address]  [phone]   Feb 14, 2020  8:40 AM CST  (Arrive by 8:25 AM)  [facility] VENKATESH Stevens  [facility] [address] [facility] [address]  [phone]   Feb 18, 2020  1:30 PM CST  (Arrive by 1:15 PM)  Return Visit with Barbie Quintana NP  [facility] ([facility] ) [address] [place] [address]  [phone]

## 2019-12-23 NOTE — PATIENT INSTRUCTIONS
We will see you back as planned. If you have any questions or concerns, we can be reached Monday through Friday 8am - 430pm at [phone] (Care Coordinator), if unable to reach them, you can try [phone] ([facility]), [phone] (LPN with Dr Walker), or [phone] (Infusion). If you have concerns related to a potential reaction/side effect after hours/weekends/holiday's, please seek emergent medical care.   Patient Education     Discharge Instructions for Chemotherapy  Your healthcare provider prescribed a type of medicine therapy for you called chemotherapy. Healthcare providers prescribe chemotherapy for many different types of illnesses, including cancer. There are many types of chemotherapy. This sheet provides general guidelines on how you can take care of yourself after your chemotherapy.  Mouth care  Don t be discouraged if you get mouth sores, even if you are following all your healthcare provider s instructions. Many people get mouth sores as a side effect of chemotherapy. Here s what you can do to prevent mouth sores:    Keep your mouth clean. Brush your teeth with a soft-bristle toothbrush after every meal.    Ask if you should use a toothpaste with fluoride, or a mixture of 1 teaspoon of salt in 8-ounces of water to brush your teeth.     Use an oral swab or special soft toothbrush if your gums bleed during regular brushing.    Don't use dental floss if it causes your gums to bleed.    Use any mouthwashes given to you as directed.    If you can t tolerate regular methods, use salt and baking soda to clean your mouth. Mix 1 teaspoon of salt and 1 teaspoon of baking soda in 1 quart of warm water. Swish and spit.    If you wear dentures, you may be told to wear them only when you eat, ask your healthcare provider. Clean dentures twice a day and soak in antimicrobial solution when you aren't wearing them. Rinse your mouth after each meal.     Watch your mouth and tongue for white patches. This may be a  sign of a type of yeast infection (thrush), a common side effect of chemotherapy. Be sure to tell your healthcare provider about these patches. Medicine can be prescribed to treat it.  Other home care  Here's what else you can do:    Try to exercise. Exercise keeps you strong and keeps your heart and lungs active. Walking and yoga are good types of exercise.     Keep clean. During chemotherapy, your body can t fight infection very well. Take short baths or showers.  ? Wash your hands before you eat and after going to the bathroom.  ? Use moisturizing soap. Chemotherapy can make your skin dry.  ? Apply moisturizing lotion several times a day to help relieve dry skin.  ? Don t take very hot or very cold showers or baths.    Don t be surprised if your chemotherapy causes slight burns to your skin--usually on the hands and feet. Some medicines used in high doses cause this to happen. Ask for a special cream to help relieve the burn and protect your skin.    Avoid people who are sick with illnesses and diseases you could catch, such as colds, flu, measles, or chicken pox as well as people who have recently had vaccinations for these illnesses.     Let your healthcare provider know if your throat is sore. You may have an infection that needs treatment.    Remember, many patients feel sick and lose their appetites during treatment. Eat small meals several times a day to keep your strength up:  ? Choose bland foods with little taste or smell if you are reacting strongly to food.  ? Be sure to cook all food thoroughly. This kills bacteria and helps you avoid infection.  ? Eat foods that are soft. Soft foods are less likely to cause stomach irritation.  ? Try to eat a variety of foods for a well-balanced diet. Drink plenty of fluids and eat foods with fiber to avoid constipation.   When to call your healthcare provider  Call your healthcare provider right away if you have any of the following:    Unexplained  bleeding    Trouble concentrating    Ongoing fatigue    Shortness of breath, wheezing, trouble breathing, or bad cough    Rapid, irregular heartbeat, or chest pain    Dizziness, lightheadedness    Constant feeling of being cold    Hives or a cut or rash that swells, turns red, feels hot or painful, or begins to ooze    Burning when you urinate    Fever of 100.4 F (38 C) or higher, or as directed by your healthcare provider   Date Last Reviewed: 5/1/2016 2000-2018 [employer]. [address] [place] [address]. All rights reserved. This information is not intended as a substitute for professional medical care. Always follow your healthcare professional's instructions.

## 2019-12-24 RX ORDER — ACYCLOVIR 400 MG/1
400 TABLET ORAL 2 TIMES DAILY
Qty: 60 TABLET | Refills: 3 | Status: SHIPPED | OUTPATIENT
Start: 2019-12-24 | End: 2020-02-14

## 2019-12-30 ENCOUNTER — APPOINTMENT (OUTPATIENT)
Dept: LAB | Facility: OTHER | Age: 69
End: 2019-12-30
Attending: INTERNAL MEDICINE
Payer: MEDICARE

## 2019-12-30 ENCOUNTER — INFUSION THERAPY VISIT (OUTPATIENT)
Dept: INFUSION THERAPY | Facility: OTHER | Age: 69
End: 2019-12-30
Attending: INTERNAL MEDICINE
Payer: MEDICARE

## 2019-12-30 VITALS
HEIGHT: 63 IN | TEMPERATURE: 98.5 F | BODY MASS INDEX: 26.76 KG/M2 | OXYGEN SATURATION: 97 % | WEIGHT: 151.01 LBS | RESPIRATION RATE: 16 BRPM | SYSTOLIC BLOOD PRESSURE: 105 MMHG | DIASTOLIC BLOOD PRESSURE: 66 MMHG | HEART RATE: 65 BPM

## 2019-12-30 DIAGNOSIS — C90.00 MULTIPLE MYELOMA NOT HAVING ACHIEVED REMISSION (H): Primary | ICD-10-CM

## 2019-12-30 LAB
ALBUMIN SERPL-MCNC: 3.3 G/DL (ref 3.4–5)
ALP SERPL-CCNC: 96 U/L (ref 40–150)
ALT SERPL W P-5'-P-CCNC: 60 U/L (ref 0–50)
AST SERPL W P-5'-P-CCNC: 36 U/L (ref 0–45)
BASOPHILS # BLD AUTO: 0 10E9/L (ref 0–0.2)
BASOPHILS NFR BLD AUTO: 0.2 %
BILIRUB DIRECT SERPL-MCNC: <0.1 MG/DL (ref 0–0.2)
BILIRUB SERPL-MCNC: 0.3 MG/DL (ref 0.2–1.3)
DIFFERENTIAL METHOD BLD: ABNORMAL
EOSINOPHIL # BLD AUTO: 0.1 10E9/L (ref 0–0.7)
EOSINOPHIL NFR BLD AUTO: 1.2 %
ERYTHROCYTE [DISTWIDTH] IN BLOOD BY AUTOMATED COUNT: 15.4 % (ref 10–15)
HCT VFR BLD AUTO: 32.1 % (ref 35–47)
HGB BLD-MCNC: 10.9 G/DL (ref 11.7–15.7)
IMM GRANULOCYTES # BLD: 0.1 10E9/L (ref 0–0.4)
IMM GRANULOCYTES NFR BLD: 1.4 %
LYMPHOCYTES # BLD AUTO: 1.3 10E9/L (ref 0.8–5.3)
LYMPHOCYTES NFR BLD AUTO: 24.8 %
MCH RBC QN AUTO: 31.2 PG (ref 26.5–33)
MCHC RBC AUTO-ENTMCNC: 34 G/DL (ref 31.5–36.5)
MCV RBC AUTO: 92 FL (ref 78–100)
MONOCYTES # BLD AUTO: 1.1 10E9/L (ref 0–1.3)
MONOCYTES NFR BLD AUTO: 20.9 %
NEUTROPHILS # BLD AUTO: 2.6 10E9/L (ref 1.6–8.3)
NEUTROPHILS NFR BLD AUTO: 51.5 %
NRBC # BLD AUTO: 0 10*3/UL
NRBC BLD AUTO-RTO: 0 /100
PLATELET # BLD AUTO: 210 10E9/L (ref 150–450)
PROT SERPL-MCNC: 9.1 G/DL (ref 6.8–8.8)
RBC # BLD AUTO: 3.49 10E12/L (ref 3.8–5.2)
WBC # BLD AUTO: 5.1 10E9/L (ref 4–11)

## 2019-12-30 PROCEDURE — 25000128 H RX IP 250 OP 636: Performed by: NURSE PRACTITIONER

## 2019-12-30 PROCEDURE — 85025 COMPLETE CBC W/AUTO DIFF WBC: CPT | Mod: ZL | Performed by: NURSE PRACTITIONER

## 2019-12-30 PROCEDURE — 36415 COLL VENOUS BLD VENIPUNCTURE: CPT | Mod: ZL | Performed by: NURSE PRACTITIONER

## 2019-12-30 PROCEDURE — 96401 CHEMO ANTI-NEOPL SQ/IM: CPT

## 2019-12-30 PROCEDURE — 80076 HEPATIC FUNCTION PANEL: CPT | Mod: ZL | Performed by: NURSE PRACTITIONER

## 2019-12-30 RX ORDER — ONDANSETRON 8 MG/1
8 TABLET, FILM COATED ORAL ONCE
Status: COMPLETED | OUTPATIENT
Start: 2019-12-30 | End: 2019-12-30

## 2019-12-30 RX ADMIN — BORTEZOMIB 2.6 MG: 3.5 INJECTION, POWDER, LYOPHILIZED, FOR SOLUTION INTRAVENOUS; SUBCUTANEOUS at 14:08

## 2019-12-30 RX ADMIN — ONDANSETRON HYDROCHLORIDE 8 MG: 4 TABLET, FILM COATED ORAL at 13:36

## 2019-12-30 ASSESSMENT — PAIN SCALES - GENERAL: PAINLEVEL: NO PAIN (0)

## 2019-12-30 ASSESSMENT — MIFFLIN-ST. JEOR: SCORE: 1169

## 2019-12-30 NOTE — PROGRESS NOTES
Patient is a 69 year old female her today for injection of Velcade per order of . Patient meets parameters for today's infusion. Patient identified with two identifiers, order verified, and verbal consent for today's infusion obtained from patient. Written consent for treatment is on file and valid.    Today's lab values: WBC: 5.1, HGB: 10.9, PLT: 210  ANC: 2.6, Alkaline Phosphatase:  96, ALT:  60, AST: 36. Patient meets order parameters for today's treatment.    Patient denies questions or concerns regarding injection and/or medication(s) being administered.    Independent dose check completed with Julia Anderson RN.     Velcade injected SQ into left upper outer arm at 45 degree angle  per protocol rotating sites.     Injection administered per protocol. Patient tolerated infusion without incident, no signs or symptoms of adverse reaction noted. Patient denies pain or discomfort.     Covered with a sterile bandage. Pt instructed to leave bandage intact for a minimum of one hour, and to call with questions or concerns. Patient states understanding, discharged ambulatory.

## 2020-01-06 ENCOUNTER — INFUSION THERAPY VISIT (OUTPATIENT)
Dept: INFUSION THERAPY | Facility: OTHER | Age: 70
End: 2020-01-06
Attending: INTERNAL MEDICINE
Payer: MEDICARE

## 2020-01-06 ENCOUNTER — APPOINTMENT (OUTPATIENT)
Dept: LAB | Facility: OTHER | Age: 70
End: 2020-01-06
Attending: INTERNAL MEDICINE
Payer: MEDICARE

## 2020-01-06 VITALS
RESPIRATION RATE: 16 BRPM | HEIGHT: 63 IN | TEMPERATURE: 98.9 F | SYSTOLIC BLOOD PRESSURE: 104 MMHG | DIASTOLIC BLOOD PRESSURE: 66 MMHG | HEART RATE: 93 BPM | WEIGHT: 155.42 LBS | BODY MASS INDEX: 27.54 KG/M2 | OXYGEN SATURATION: 94 %

## 2020-01-06 DIAGNOSIS — C90.00 MULTIPLE MYELOMA NOT HAVING ACHIEVED REMISSION (H): Primary | ICD-10-CM

## 2020-01-06 LAB
ALBUMIN SERPL-MCNC: 3.2 G/DL (ref 3.4–5)
ALP SERPL-CCNC: 110 U/L (ref 40–150)
ALT SERPL W P-5'-P-CCNC: 41 U/L (ref 0–50)
AST SERPL W P-5'-P-CCNC: 24 U/L (ref 0–45)
BASOPHILS # BLD AUTO: 0 10E9/L (ref 0–0.2)
BASOPHILS NFR BLD AUTO: 0.4 %
BILIRUB DIRECT SERPL-MCNC: <0.1 MG/DL (ref 0–0.2)
BILIRUB SERPL-MCNC: 0.2 MG/DL (ref 0.2–1.3)
DIFFERENTIAL METHOD BLD: ABNORMAL
EOSINOPHIL # BLD AUTO: 0.1 10E9/L (ref 0–0.7)
EOSINOPHIL NFR BLD AUTO: 1.6 %
ERYTHROCYTE [DISTWIDTH] IN BLOOD BY AUTOMATED COUNT: 15.2 % (ref 10–15)
HCT VFR BLD AUTO: 32.5 % (ref 35–47)
HGB BLD-MCNC: 10.9 G/DL (ref 11.7–15.7)
IMM GRANULOCYTES # BLD: 0.2 10E9/L (ref 0–0.4)
IMM GRANULOCYTES NFR BLD: 3.2 %
LYMPHOCYTES # BLD AUTO: 1.3 10E9/L (ref 0.8–5.3)
LYMPHOCYTES NFR BLD AUTO: 25.8 %
MCH RBC QN AUTO: 31.1 PG (ref 26.5–33)
MCHC RBC AUTO-ENTMCNC: 33.5 G/DL (ref 31.5–36.5)
MCV RBC AUTO: 93 FL (ref 78–100)
MONOCYTES # BLD AUTO: 0.9 10E9/L (ref 0–1.3)
MONOCYTES NFR BLD AUTO: 17.6 %
NEUTROPHILS # BLD AUTO: 2.5 10E9/L (ref 1.6–8.3)
NEUTROPHILS NFR BLD AUTO: 51.4 %
NRBC # BLD AUTO: 0 10*3/UL
NRBC BLD AUTO-RTO: 0 /100
PLATELET # BLD AUTO: 238 10E9/L (ref 150–450)
PROT SERPL-MCNC: 8.9 G/DL (ref 6.8–8.8)
RBC # BLD AUTO: 3.5 10E12/L (ref 3.8–5.2)
WBC # BLD AUTO: 4.9 10E9/L (ref 4–11)

## 2020-01-06 PROCEDURE — 85025 COMPLETE CBC W/AUTO DIFF WBC: CPT | Mod: ZL | Performed by: NURSE PRACTITIONER

## 2020-01-06 PROCEDURE — 36415 COLL VENOUS BLD VENIPUNCTURE: CPT | Mod: ZL | Performed by: NURSE PRACTITIONER

## 2020-01-06 PROCEDURE — 96401 CHEMO ANTI-NEOPL SQ/IM: CPT

## 2020-01-06 PROCEDURE — 80076 HEPATIC FUNCTION PANEL: CPT | Mod: ZL | Performed by: NURSE PRACTITIONER

## 2020-01-06 PROCEDURE — 25000128 H RX IP 250 OP 636: Performed by: NURSE PRACTITIONER

## 2020-01-06 RX ORDER — ONDANSETRON 4 MG/1
8 TABLET, FILM COATED ORAL ONCE
Status: COMPLETED | OUTPATIENT
Start: 2020-01-06 | End: 2020-01-06

## 2020-01-06 RX ADMIN — ONDANSETRON HYDROCHLORIDE 8 MG: 4 TABLET, FILM COATED ORAL at 12:30

## 2020-01-06 RX ADMIN — BORTEZOMIB 2.6 MG: 3.5 INJECTION, POWDER, LYOPHILIZED, FOR SOLUTION INTRAVENOUS; SUBCUTANEOUS at 12:28

## 2020-01-06 ASSESSMENT — PAIN SCALES - GENERAL: PAINLEVEL: NO PAIN (0)

## 2020-01-06 ASSESSMENT — MIFFLIN-ST. JEOR: SCORE: 1189.13

## 2020-01-06 NOTE — PROGRESS NOTES
Patient is a 69 year old female here today for injection of Velcade per order of . Patient meets parameters for today's infusion. Patient identified with two identifiers, order verified, and verbal consent for today's infusion obtained from patient. Written consent for treatment is on file and valid.    Recent lab values: WBC: 4.9, HGB: 10.9, PLT: 2383  ANC: 2.5. Patient meets order parameters for today's treatment.  Patient denies questions or concerns regarding injection and/or medication(s) being administered.  Velcade injected SQ into uppper outer right arm subq per protocol rotating sites.     Injection administered per protocol. Patient tolerated infusion without incident, no signs or symptoms of adverse reaction noted. Patient denies pain or discomfort.     Covered with a sterile bandage. Pt instructed to leave bandage intact for a minimum of one hour, and to call with questions or concerns. Copy of appointments, discharge instructions, and after visit summary (AVS) provided to patient. Patient states understanding, discharged ambulatory.

## 2020-01-10 ENCOUNTER — PATIENT OUTREACH (OUTPATIENT)
Dept: ONCOLOGY | Facility: OTHER | Age: 70
End: 2020-01-10

## 2020-01-10 DIAGNOSIS — E78.2 MIXED HYPERLIPIDEMIA: ICD-10-CM

## 2020-01-13 ENCOUNTER — APPOINTMENT (OUTPATIENT)
Dept: LAB | Facility: OTHER | Age: 70
End: 2020-01-13
Attending: INTERNAL MEDICINE
Payer: MEDICARE

## 2020-01-13 ENCOUNTER — INFUSION THERAPY VISIT (OUTPATIENT)
Dept: INFUSION THERAPY | Facility: OTHER | Age: 70
End: 2020-01-13
Attending: INTERNAL MEDICINE
Payer: MEDICARE

## 2020-01-13 VITALS
OXYGEN SATURATION: 95 % | HEART RATE: 80 BPM | RESPIRATION RATE: 16 BRPM | HEIGHT: 63 IN | BODY MASS INDEX: 27.38 KG/M2 | DIASTOLIC BLOOD PRESSURE: 68 MMHG | SYSTOLIC BLOOD PRESSURE: 115 MMHG | WEIGHT: 154.54 LBS

## 2020-01-13 DIAGNOSIS — C90.00 MULTIPLE MYELOMA NOT HAVING ACHIEVED REMISSION (H): Primary | ICD-10-CM

## 2020-01-13 LAB
ALBUMIN SERPL-MCNC: 3.3 G/DL (ref 3.4–5)
ALP SERPL-CCNC: 100 U/L (ref 40–150)
ALT SERPL W P-5'-P-CCNC: 75 U/L (ref 0–50)
AST SERPL W P-5'-P-CCNC: 44 U/L (ref 0–45)
BASOPHILS # BLD AUTO: 0 10E9/L (ref 0–0.2)
BASOPHILS NFR BLD AUTO: 0.2 %
BILIRUB DIRECT SERPL-MCNC: <0.1 MG/DL (ref 0–0.2)
BILIRUB SERPL-MCNC: 0.3 MG/DL (ref 0.2–1.3)
DIFFERENTIAL METHOD BLD: ABNORMAL
EOSINOPHIL # BLD AUTO: 0.1 10E9/L (ref 0–0.7)
EOSINOPHIL NFR BLD AUTO: 1.4 %
ERYTHROCYTE [DISTWIDTH] IN BLOOD BY AUTOMATED COUNT: 15.2 % (ref 10–15)
HCT VFR BLD AUTO: 32.6 % (ref 35–47)
HGB BLD-MCNC: 11 G/DL (ref 11.7–15.7)
IMM GRANULOCYTES # BLD: 0.1 10E9/L (ref 0–0.4)
IMM GRANULOCYTES NFR BLD: 2.1 %
LYMPHOCYTES # BLD AUTO: 1.2 10E9/L (ref 0.8–5.3)
LYMPHOCYTES NFR BLD AUTO: 24.8 %
MCH RBC QN AUTO: 31.5 PG (ref 26.5–33)
MCHC RBC AUTO-ENTMCNC: 33.7 G/DL (ref 31.5–36.5)
MCV RBC AUTO: 93 FL (ref 78–100)
MONOCYTES # BLD AUTO: 0.9 10E9/L (ref 0–1.3)
MONOCYTES NFR BLD AUTO: 17.7 %
NEUTROPHILS # BLD AUTO: 2.6 10E9/L (ref 1.6–8.3)
NEUTROPHILS NFR BLD AUTO: 53.8 %
NRBC # BLD AUTO: 0 10*3/UL
NRBC BLD AUTO-RTO: 0 /100
PLATELET # BLD AUTO: 211 10E9/L (ref 150–450)
PROT SERPL-MCNC: 9 G/DL (ref 6.8–8.8)
RBC # BLD AUTO: 3.49 10E12/L (ref 3.8–5.2)
WBC # BLD AUTO: 4.9 10E9/L (ref 4–11)

## 2020-01-13 PROCEDURE — 96401 CHEMO ANTI-NEOPL SQ/IM: CPT

## 2020-01-13 PROCEDURE — 83883 ASSAY NEPHELOMETRY NOT SPEC: CPT | Mod: ZL | Performed by: NURSE PRACTITIONER

## 2020-01-13 PROCEDURE — 82784 ASSAY IGA/IGD/IGG/IGM EACH: CPT | Mod: ZL | Performed by: NURSE PRACTITIONER

## 2020-01-13 PROCEDURE — 00000402 ZZHCL STATISTIC TOTAL PROTEIN: Mod: ZL | Performed by: NURSE PRACTITIONER

## 2020-01-13 PROCEDURE — 25000128 H RX IP 250 OP 636: Mod: JW | Performed by: NURSE PRACTITIONER

## 2020-01-13 PROCEDURE — 84165 PROTEIN E-PHORESIS SERUM: CPT | Mod: ZL | Performed by: NURSE PRACTITIONER

## 2020-01-13 PROCEDURE — 80076 HEPATIC FUNCTION PANEL: CPT | Mod: ZL | Performed by: NURSE PRACTITIONER

## 2020-01-13 PROCEDURE — 36415 COLL VENOUS BLD VENIPUNCTURE: CPT | Mod: ZL | Performed by: NURSE PRACTITIONER

## 2020-01-13 PROCEDURE — 85025 COMPLETE CBC W/AUTO DIFF WBC: CPT | Mod: ZL | Performed by: NURSE PRACTITIONER

## 2020-01-13 RX ORDER — ONDANSETRON 4 MG/1
8 TABLET, FILM COATED ORAL ONCE
Status: COMPLETED | OUTPATIENT
Start: 2020-01-13 | End: 2020-01-13

## 2020-01-13 RX ADMIN — ONDANSETRON HYDROCHLORIDE 8 MG: 4 TABLET, FILM COATED ORAL at 13:47

## 2020-01-13 RX ADMIN — BORTEZOMIB 2.6 MG: 3.5 INJECTION, POWDER, LYOPHILIZED, FOR SOLUTION INTRAVENOUS; SUBCUTANEOUS at 13:48

## 2020-01-13 ASSESSMENT — MIFFLIN-ST. JEOR: SCORE: 1185.13

## 2020-01-13 ASSESSMENT — PAIN SCALES - GENERAL: PAINLEVEL: NO PAIN (0)

## 2020-01-13 NOTE — PROGRESS NOTES
Call placed to Bonita Quintana NP regarding patient's increase in ALT. Current ALT- 75. 1/6/20-ALT--41. Per Bonita, OK to give the velcade and have patient take her Cyclophosphamide.

## 2020-01-13 NOTE — PROGRESS NOTES
Patient is a 69 year old female here today for injection of Velcade per order of . Patient meets parameters for today's infusion. Patient identified with two identifiers, order verified, and verbal consent for today's infusion obtained from patient. Written consent for treatment is on file and valid.    Recent lab values: WBC: 4.9, HGB: 11.0, PLT: 211  ANC: 2.6. Patient meets order parameters for today's treatment.  Patient denies questions or concerns regarding injection and/or medication(s) being administered.  Velcade injected SQ into left upper outer arm per protocol rotating sites.     Injection administered per protocol. Patient tolerated infusion without incident, no signs or symptoms of adverse reaction noted. Patient denies pain or discomfort.     Covered with a sterile bandage. Pt instructed to leave bandage intact for a minimum of one hour, and to call with questions or concerns. Copy of appointments, discharge instructions, and after visit summary (AVS) provided to patient. Patient states understanding, discharged ambulatory.

## 2020-01-15 LAB
ALBUMIN SERPL ELPH-MCNC: 4 G/DL (ref 3.7–5.1)
ALPHA1 GLOB SERPL ELPH-MCNC: 0.3 G/DL (ref 0.2–0.4)
ALPHA2 GLOB SERPL ELPH-MCNC: 0.7 G/DL (ref 0.5–0.9)
B-GLOBULIN SERPL ELPH-MCNC: 0.6 G/DL (ref 0.6–1)
GAMMA GLOB SERPL ELPH-MCNC: 2.9 G/DL (ref 0.7–1.6)
IGA SERPL-MCNC: 8 MG/DL (ref 84–499)
IGG SERPL-MCNC: 3301 MG/DL (ref 610–1616)
IGM SERPL-MCNC: 11 MG/DL (ref 35–242)
KAPPA LC UR-MCNC: 0.48 MG/DL (ref 0.33–1.94)
KAPPA LC/LAMBDA SER: 2.18 {RATIO} (ref 0.26–1.65)
LAMBDA LC SERPL-MCNC: 0.22 MG/DL (ref 0.57–2.63)
M PROTEIN SERPL ELPH-MCNC: 2.7 G/DL
PROT PATTERN SERPL ELPH-IMP: ABNORMAL

## 2020-01-20 NOTE — PROGRESS NOTES
Oncology Follow-up Visit:  January 21, 2020    Reason for Visit:  Patient presents with:  RECHECK: Follow up Multiple myeloma not having achieved remission      Nursing Note and documentation reviewed: yes    HPI:  This is a 69-year-old female patient who presents to the oncology clinic today for evaluation prior to receiving cycle 5 chemotherapy for Stage II-RISS IgG multiple myeloma diagnosed June 2019.  She progressed on Velcade and dexamethasone and is now receiving CyBorD with decreased dose of cytoxan related to LFT elevation.    She presents to the clinic today stating she is doing well.  She does experience some mild fatigue at times but rest when she needs.  She does get a headache the day after chemotherapy.  Does also notice occasional palpitations.  She has some shortness of breath when she exerts herself but denies any chest pain.  She also does complain of feeling as though her bones get sore at times but she has no pain at this time.    Oncologic History:     12/31/2018   patient presented to the emergency room with vertigo and fatigue.  CT scan of the head was negative and subsequent stress test was negative.  5/3/2019  She was seen by her PCP who ordered lab work and noted a total protein of 12.9.  SPEP at that time showed an M spike of 6.2 with a large monoclonal protein seen in the gamma fraction.  Urine immunofixation showed a possible small protein band in the gamma fraction  5/31/2019  she was evaluated by Dr. Walker with Medical Oncology with plan to rule out myeloma and obtain bone marrow aspiration biopsy as well as a metastatic bone survey along with additional labwork  6/18/2019 she underwent bone marrow aspiration and biopsy  6/24/2019  She was seen again by Dr. Walker and CBC showed a hemoglobin of 9.3, M spike 7.3 with monoclonal IgG immunoglobulin of kappa light chain type; serum viscosity was 2.9; quantitative immunoglobulins showed an IgG of 8160, beta-2 microglobulin was 5.8,  BUN was 21 with creatinine is 0.8 and total protein was 13.7.  Quantitative kappa/lambda free light chains showed an elevated ratio of 17.0 bone marrow aspiration biopsy showed plasma cell myeloma with approximately 80% plasma cells.  Immunofixation showed IgG kappa and flow cytometry revealed kappa monotypic plasma cells consistent with clonal plasma cell neoplasm and FISH panel was pending at that time.  It was felt she had at least stage II disease based on her beta-2 microglobulin and anemia.  Plan was to treat with Velcade 1.3 mg per metered squared days 1, 4, 8 and 11/Decadron 40 mg on days 1, 8 and 15 initiation of Revlimid with the second cycle at 25 mg daily days 1 through 14.  Plan was to also obtain an MRI of the lumbar spine to rule out lytic lesion at L3.  She was initiated on Zovirax 400 mg p.o. twice daily.  6/25/2019  1st cycle of chemotherapy initiated  7/1/2019 note in chart regarding patient's large co-pay for the Revlimid and no plan at this point to initiate Revlimid and treat only with Velcade and Decadron per Dr. Walker  7/11/2019  MRI lumbar spine shows a pathologic superior endplate compression fracture at L3 without evidence of retropulsed fragment and innumerable enhancing lesions throughout the lumbar spine consistent with history of multiple myeloma.  9/10/2019  Increasing M-spike and kappa lambda ratio  10/1/2019  Initiation of CyBorD     Current Chemo Regime/TX:  Velcade 1.5mg.m2/cyclophosphamide 150mg every 7 days on days 1,8,15 and 22/decadron 40mg days 1,8,15,22       **Zometa 4mg every 3 months  Current Cycle: 5  # of completed cycles:  4     Previous treatment:   Velcade 1.3 mg per metered squared on days 1, 4, 8 and 11 with Decadron 40 mg on days 1, 8 and 15 x 4 cycles    Past Medical History:   Diagnosis Date     Arthritis      Depressive disorder      Essential hypertension 10/1/2015     Major depressive disorder, recurrent episode, mild (H) 10/1/2015     Mixed hyperlipidemia  "10/1/2015     Multiple myeloma not having achieved remission (H) 6/24/2019     Other specified disorders of bladder 07/09/2012    Irritable Bladder     Seasonal allergies 10/1/2015     Unspecified essential hypertension 03/19/2007     Unspecified sinusitis (chronic) 09/05/2007       Past Surgical History:   Procedure Laterality Date     APPENDECTOMY       BONE MARROW BIOPSY, BONE SPECIMEN, NEEDLE/TROCAR N/A 6/18/2019    Procedure: BONE MARROW BIOPSY;  Surgeon: Maciej Sanz MD;  Location: [facility]     CHOLECYSTECTOMY       COLONOSCOPY  07-    repeat 10 years     COLONOSCOPY N/A 12/30/2016    Procedure: COLONOSCOPY;  Surgeon: Bhaskar Franklin DO;  Location: [facility]     SINUS SURGERY       TUBAL STERILIZATION         Family History   Problem Relation Age of Onset     Breast Cancer Mother 66        Cause of Death     Parkinsonism Father         \"Possible\"     Coronary Artery Disease Father      Pacemaker Father      Thyroid Disease Daughter      Diabetes No family hx of      Hypertension No family hx of      Hyperlipidemia No family hx of      Cerebrovascular Disease No family hx of      Colon Cancer No family hx of      Prostate Cancer No family hx of      Genetic Disorder No family hx of      Asthma No family hx of      Anesthesia Reaction No family hx of        Social History     Socioeconomic History     Marital status:      Spouse name: Not on file     Number of children: Not on file     Years of education: Not on file     Highest education level: Not on file   Occupational History     Occupation: Financial     Comment:  - (FT)   Social Needs     Financial resource strain: Not on file     Food insecurity:     Worry: Not on file     Inability: Not on file     Transportation needs:     Medical: Not on file     Non-medical: Not on file   Tobacco Use     Smoking status: Never Smoker     Smokeless tobacco: Never Used     Tobacco comment: Tried to Quit (YES); QUIT in 1971; Passive " Exposure (NO)   Substance and Sexual Activity     Alcohol use: Yes     Comment: RARELY     Drug use: No     Sexual activity: Yes     Partners: Male     Birth control/protection: None   Lifestyle     Physical activity:     Days per week: Not on file     Minutes per session: Not on file     Stress: Not on file   Relationships     Social connections:     Talks on phone: Not on file     Gets together: Not on file     Attends [employer] service: Not on file     Active member of club or organization: Not on file     Attends meetings of clubs or organizations: Not on file     Relationship status: Not on file     Intimate partner violence:     Fear of current or ex partner: Not on file     Emotionally abused: Not on file     Physically abused: Not on file     Forced sexual activity: Not on file   Other Topics Concern      Service Not Asked     Blood Transfusions Not Asked     Caffeine Concern Yes     Comment: Coffee >6 cups daily     Occupational Exposure Not Asked     Hobby Hazards Not Asked     Sleep Concern Not Asked     Stress Concern Not Asked     Weight Concern Not Asked     Special Diet Not Asked     Back Care Not Asked     Exercise Not Asked     Bike Helmet Not Asked     Seat Belt Not Asked     Self-Exams Not Asked     Parent/sibling w/ CABG, MI or angioplasty before 65F 55M? No   Social History Narrative     Not on file       Current Outpatient Medications   Medication     acyclovir (ZOVIRAX) 400 MG tablet     atorvastatin (LIPITOR) 10 MG tablet     Bortezomib (VELCADE IJ)     cyclophosphamide (CYTOXAN) 50 MG capsule     dexamethasone (DECADRON) 4 MG tablet     fluticasone (FLONASE) 50 MCG/ACT nasal spray     losartan (COZAAR) 50 MG tablet     ondansetron (ZOFRAN) 8 MG tablet     sertraline (ZOLOFT) 50 MG tablet     EPINEPHrine (EPIPEN) 0.3 MG/0.3ML injection     LORazepam (ATIVAN) 0.5 MG tablet     prochlorperazine (COMPAZINE) 10 MG tablet     No current facility-administered medications for this visit.      "    Allergies   Allergen Reactions     Lisinopril Cough     Phenylephrine Hcl Other (See Comments)     **Entex  HEADACHE (SEVERE)     Phenylpropanolamine Other (See Comments)     **Entex  HEADACHE (SEVERE)     Pseudoephedrine Tannate Other (See Comments)     **Entex  HEADACHE (SEVERE)     Levofloxacin Rash     **Levaquin     Moxifloxacin Hcl [Avelox] Rash       Review Of Systems:  Constitutional:    denies fever, weight changes, chills, and night sweats.  Eyes:    denies blurred or double vision-eyes get watery  Ears/Nose/Throat:   denies ear pain, nose problems, difficulty swallowing  Respiratory:   See hPI  Skin:   denies rash, lesions  Cardiovascular:   denies chest pain, palpitations, edema  Gastrointestinal:   denies abdominal pain, bloating, nausea, early satiety; no change in bowel habits or blood in stool-uses stool softner  Genitourinary:   denies difficulty with urination, blood in urine  Musculoskeletal:    See hPI  Neurologic:   denies lightheadedness, numbness or tingling  Psychiatric:   denies anxiety, depression  Hematologic/Lymphatic/Immunologic:   denies easy bruising, easy bleeding, lumps or bumps noted  Endocrine:   Denies increased thirst    ECOG Performance Status: 1    Physical Exam:  /68   Pulse 89   Temp 98.5  F (36.9  C) (Tympanic)   Resp 18   Ht 1.6 m (5' 3\")   Wt 70.8 kg (156 lb 1.4 oz)   SpO2 98%   BMI 27.65 kg/m      GENERAL APPEARANCE: Healthy, alert and in no acute distress.  HEENT: Normocephalic, Sclerae anicteric. Oropharynx without ulcers, lesions, or thrush.  NECK:   No asymmetry or masses, no thyromegaly.  LYMPHATICS: No palpable cervical, supraclavicular, axillary, or inguinal nodes   RESP: Lungs clear to auscultation bilaterally, respirations regular and easy  CARDIOVASCULAR: Regular rate and rhythm. Normal S1, S2; no murmur, gallop, or rub.  ABDOMEN: Soft, nontender. Bowel sounds auscultated all 4 quadrants. No palpable organomegaly or masses.  MUSCULOSKELETAL: " Extremities without gross deformities noted. No edema of bilateral lower extremities.  NEURO: Alert and oriented x 3.  Gait steady.  PSYCHIATRIC: Mentation and affect appear normal.  Mood appropriate.    Laboratory:  Results for WILLIAM WILDER (MRN [id number]) as of 1/19/2020 21:59   Ref. Range 1/13/2020 12:27   Albumin Fraction Latest Ref Range: 3.7 - 5.1 g/dL 4.0   Alpha 1 Fraction Latest Ref Range: 0.2 - 0.4 g/dL 0.3   Alpha 2 Fraction Latest Ref Range: 0.5 - 0.9 g/dL 0.7   Beta Fraction Latest Ref Range: 0.6 - 1.0 g/dL 0.6   ELP Interpretation: Unknown Monoclonal protei...   Gamma Fraction Latest Ref Range: 0.7 - 1.6 g/dL 2.9 (H)   IGA Latest Ref Range: 84 - 499 mg/dL 8 (L)   IGG Latest Ref Range: 610 - 1,616 mg/dL 3,301 (H)   IGM Latest Ref Range: 35 - 242 mg/dL 11 (L)   [place] Free Lt Chain Latest Ref Range: 0.33 - 1.94 mg/dL 0.48   Kappa Lambda Ratio Latest Ref Range: 0.26 - 1.65  2.18 (H)   Lambda Free Lt Chain Latest Ref Range: 0.57 - 2.63 mg/dL 0.22 (L)   Monoclonal Peak Latest Ref Range: 0.0 g/dL 2.7 (H)     Results for orders placed or performed in visit on 01/21/20   Comprehensive metabolic panel     Status: Abnormal   Result Value Ref Range    Sodium 134 133 - 144 mmol/L    Potassium 4.0 3.4 - 5.3 mmol/L    Chloride 103 94 - 109 mmol/L    Carbon Dioxide 25 20 - 32 mmol/L    Anion Gap 6 3 - 14 mmol/L    Glucose 112 (H) 70 - 99 mg/dL    Urea Nitrogen 18 7 - 30 mg/dL    Creatinine 0.63 0.52 - 1.04 mg/dL    GFR Estimate >90 >60 mL/min/[1.73_m2]    GFR Estimate If Black >90 >60 mL/min/[1.73_m2]    Calcium 8.7 8.5 - 10.1 mg/dL    Bilirubin Total 0.3 0.2 - 1.3 mg/dL    Albumin 3.3 (L) 3.4 - 5.0 g/dL    Protein Total 9.1 (H) 6.8 - 8.8 g/dL    Alkaline Phosphatase 112 40 - 150 U/L    ALT 99 (H) 0 - 50 U/L    AST 54 (H) 0 - 45 U/L   CBC with platelets differential     Status: Abnormal   Result Value Ref Range    WBC 5.6 4.0 - 11.0 10e9/L    RBC Count 3.49 (L) 3.8 - 5.2 10e12/L    Hemoglobin 11.1 (L) 11.7 - 15.7  g/dL    Hematocrit 32.0 (L) 35.0 - 47.0 %    MCV 92 78 - 100 fl    MCH 31.8 26.5 - 33.0 pg    MCHC 34.7 31.5 - 36.5 g/dL    RDW 15.2 (H) 10.0 - 15.0 %    Platelet Count 199 150 - 450 10e9/L    Diff Method Automated Method     % Neutrophils 57.5 %    % Lymphocytes 20.7 %    % Monocytes 17.3 %    % Eosinophils 1.8 %    % Basophils 0.4 %    % Immature Granulocytes 2.3 %    Nucleated RBCs 0 0 /100    Absolute Neutrophil 3.2 1.6 - 8.3 10e9/L    Absolute Lymphocytes 1.2 0.8 - 5.3 10e9/L    Absolute Monocytes 1.0 0.0 - 1.3 10e9/L    Absolute Eosinophils 0.1 0.0 - 0.7 10e9/L    Absolute Basophils 0.0 0.0 - 0.2 10e9/L    Abs Immature Granulocytes 0.1 0 - 0.4 10e9/L    Absolute Nucleated RBC 0.0        Imaging Studies:  None completed for today's visit      ASSESSMENT/PLAN:    #1 Multiple myeloma:   IgG kappa multiple myeloma with 80% involvement of the bone marrow diagnosed June 2019 initially treated with Velcade and Decadron and received 4 cycles with increasing M-spike and kappa/lambda ratio.  Treatment changed to CyBorD on 10/1/2019 with elevating LFT's felt to be related tot he Cytoxan so this was decreased with cycle 3 day 8.  M-spike continues to drop.  Will continue the same and she will follow-up prior to cycle 6-day 1 with myeloma labs.    #2 myeloma lytic lesion: Receive her Zometa infusion today and again in 3 months.     #3 elevated liver functions: Continued elevation of her liver functions.  Per Dr. Walker, will hold the Cytoxan if liver functions increase greater than 3 times the upper limit of normal.    I encouraged patient to call with any questions or concerns.       Barbie Quintana, NP  APRN, FNP-BC, AOCNP

## 2020-01-21 ENCOUNTER — ONCOLOGY VISIT (OUTPATIENT)
Dept: ONCOLOGY | Facility: OTHER | Age: 70
End: 2020-01-21
Attending: NURSE PRACTITIONER
Payer: MEDICARE

## 2020-01-21 ENCOUNTER — INFUSION THERAPY VISIT (OUTPATIENT)
Dept: INFUSION THERAPY | Facility: OTHER | Age: 70
End: 2020-01-21
Attending: INTERNAL MEDICINE
Payer: MEDICARE

## 2020-01-21 ENCOUNTER — APPOINTMENT (OUTPATIENT)
Dept: LAB | Facility: OTHER | Age: 70
End: 2020-01-21
Attending: INTERNAL MEDICINE
Payer: MEDICARE

## 2020-01-21 VITALS
HEIGHT: 63 IN | TEMPERATURE: 98.5 F | SYSTOLIC BLOOD PRESSURE: 116 MMHG | HEART RATE: 74 BPM | WEIGHT: 156.09 LBS | BODY MASS INDEX: 27.66 KG/M2 | DIASTOLIC BLOOD PRESSURE: 69 MMHG | RESPIRATION RATE: 18 BRPM | OXYGEN SATURATION: 98 %

## 2020-01-21 VITALS
HEART RATE: 89 BPM | DIASTOLIC BLOOD PRESSURE: 68 MMHG | RESPIRATION RATE: 18 BRPM | OXYGEN SATURATION: 98 % | TEMPERATURE: 98.5 F | SYSTOLIC BLOOD PRESSURE: 122 MMHG | HEIGHT: 63 IN | BODY MASS INDEX: 27.66 KG/M2 | WEIGHT: 156.09 LBS

## 2020-01-21 DIAGNOSIS — C90.00 MULTIPLE MYELOMA NOT HAVING ACHIEVED REMISSION (H): Primary | ICD-10-CM

## 2020-01-21 DIAGNOSIS — R79.89 ELEVATED LFTS: ICD-10-CM

## 2020-01-21 DIAGNOSIS — M89.9 BONE LESION: ICD-10-CM

## 2020-01-21 LAB
ALBUMIN SERPL-MCNC: 3.3 G/DL (ref 3.4–5)
ALP SERPL-CCNC: 112 U/L (ref 40–150)
ALT SERPL W P-5'-P-CCNC: 99 U/L (ref 0–50)
ANION GAP SERPL CALCULATED.3IONS-SCNC: 6 MMOL/L (ref 3–14)
AST SERPL W P-5'-P-CCNC: 54 U/L (ref 0–45)
BASOPHILS # BLD AUTO: 0 10E9/L (ref 0–0.2)
BASOPHILS NFR BLD AUTO: 0.4 %
BILIRUB SERPL-MCNC: 0.3 MG/DL (ref 0.2–1.3)
BUN SERPL-MCNC: 18 MG/DL (ref 7–30)
CALCIUM SERPL-MCNC: 8.7 MG/DL (ref 8.5–10.1)
CHLORIDE SERPL-SCNC: 103 MMOL/L (ref 94–109)
CO2 SERPL-SCNC: 25 MMOL/L (ref 20–32)
CREAT SERPL-MCNC: 0.63 MG/DL (ref 0.52–1.04)
DIFFERENTIAL METHOD BLD: ABNORMAL
EOSINOPHIL # BLD AUTO: 0.1 10E9/L (ref 0–0.7)
EOSINOPHIL NFR BLD AUTO: 1.8 %
ERYTHROCYTE [DISTWIDTH] IN BLOOD BY AUTOMATED COUNT: 15.2 % (ref 10–15)
GFR SERPL CREATININE-BSD FRML MDRD: >90 ML/MIN/{1.73_M2}
GLUCOSE SERPL-MCNC: 112 MG/DL (ref 70–99)
HCT VFR BLD AUTO: 32 % (ref 35–47)
HGB BLD-MCNC: 11.1 G/DL (ref 11.7–15.7)
IMM GRANULOCYTES # BLD: 0.1 10E9/L (ref 0–0.4)
IMM GRANULOCYTES NFR BLD: 2.3 %
LYMPHOCYTES # BLD AUTO: 1.2 10E9/L (ref 0.8–5.3)
LYMPHOCYTES NFR BLD AUTO: 20.7 %
MCH RBC QN AUTO: 31.8 PG (ref 26.5–33)
MCHC RBC AUTO-ENTMCNC: 34.7 G/DL (ref 31.5–36.5)
MCV RBC AUTO: 92 FL (ref 78–100)
MONOCYTES # BLD AUTO: 1 10E9/L (ref 0–1.3)
MONOCYTES NFR BLD AUTO: 17.3 %
NEUTROPHILS # BLD AUTO: 3.2 10E9/L (ref 1.6–8.3)
NEUTROPHILS NFR BLD AUTO: 57.5 %
NRBC # BLD AUTO: 0 10*3/UL
NRBC BLD AUTO-RTO: 0 /100
PLATELET # BLD AUTO: 199 10E9/L (ref 150–450)
POTASSIUM SERPL-SCNC: 4 MMOL/L (ref 3.4–5.3)
PROT SERPL-MCNC: 9.1 G/DL (ref 6.8–8.8)
RBC # BLD AUTO: 3.49 10E12/L (ref 3.8–5.2)
SODIUM SERPL-SCNC: 134 MMOL/L (ref 133–144)
WBC # BLD AUTO: 5.6 10E9/L (ref 4–11)

## 2020-01-21 PROCEDURE — 25000128 H RX IP 250 OP 636: Performed by: INTERNAL MEDICINE

## 2020-01-21 PROCEDURE — 25800030 ZZH RX IP 258 OP 636: Performed by: NURSE PRACTITIONER

## 2020-01-21 PROCEDURE — 25000128 H RX IP 250 OP 636: Performed by: NURSE PRACTITIONER

## 2020-01-21 PROCEDURE — G0463 HOSPITAL OUTPT CLINIC VISIT: HCPCS | Mod: 25

## 2020-01-21 PROCEDURE — 80053 COMPREHEN METABOLIC PANEL: CPT | Mod: ZL | Performed by: INTERNAL MEDICINE

## 2020-01-21 PROCEDURE — G0463 HOSPITAL OUTPT CLINIC VISIT: HCPCS

## 2020-01-21 PROCEDURE — 99213 OFFICE O/P EST LOW 20 MIN: CPT | Performed by: NURSE PRACTITIONER

## 2020-01-21 PROCEDURE — 96365 THER/PROPH/DIAG IV INF INIT: CPT

## 2020-01-21 PROCEDURE — 96367 TX/PROPH/DG ADDL SEQ IV INF: CPT

## 2020-01-21 PROCEDURE — 96401 CHEMO ANTI-NEOPL SQ/IM: CPT

## 2020-01-21 PROCEDURE — 36415 COLL VENOUS BLD VENIPUNCTURE: CPT | Mod: ZL | Performed by: INTERNAL MEDICINE

## 2020-01-21 PROCEDURE — 85025 COMPLETE CBC W/AUTO DIFF WBC: CPT | Mod: ZL | Performed by: INTERNAL MEDICINE

## 2020-01-21 RX ORDER — ONDANSETRON 4 MG/1
8 TABLET, FILM COATED ORAL ONCE
Status: CANCELLED
Start: 2020-02-04

## 2020-01-21 RX ORDER — EPINEPHRINE 1 MG/ML
0.3 INJECTION, SOLUTION, CONCENTRATE INTRAVENOUS EVERY 5 MIN PRN
Status: CANCELLED | OUTPATIENT
Start: 2020-01-28

## 2020-01-21 RX ORDER — METHYLPREDNISOLONE SODIUM SUCCINATE 125 MG/2ML
125 INJECTION, POWDER, LYOPHILIZED, FOR SOLUTION INTRAMUSCULAR; INTRAVENOUS
Status: CANCELLED
Start: 2020-01-28

## 2020-01-21 RX ORDER — MEPERIDINE HYDROCHLORIDE 25 MG/ML
25 INJECTION INTRAMUSCULAR; INTRAVENOUS; SUBCUTANEOUS EVERY 30 MIN PRN
Status: CANCELLED | OUTPATIENT
Start: 2020-02-11

## 2020-01-21 RX ORDER — ZOLEDRONIC ACID 0.04 MG/ML
4 INJECTION, SOLUTION INTRAVENOUS ONCE
Status: COMPLETED | OUTPATIENT
Start: 2020-01-21 | End: 2020-01-21

## 2020-01-21 RX ORDER — ALBUTEROL SULFATE 90 UG/1
1-2 AEROSOL, METERED RESPIRATORY (INHALATION)
Status: CANCELLED
Start: 2020-01-28

## 2020-01-21 RX ORDER — MEPERIDINE HYDROCHLORIDE 25 MG/ML
25 INJECTION INTRAMUSCULAR; INTRAVENOUS; SUBCUTANEOUS EVERY 30 MIN PRN
Status: CANCELLED | OUTPATIENT
Start: 2020-01-28

## 2020-01-21 RX ORDER — DIPHENHYDRAMINE HYDROCHLORIDE 50 MG/ML
50 INJECTION INTRAMUSCULAR; INTRAVENOUS
Status: CANCELLED
Start: 2020-02-04

## 2020-01-21 RX ORDER — ALBUTEROL SULFATE 0.83 MG/ML
2.5 SOLUTION RESPIRATORY (INHALATION)
Status: CANCELLED | OUTPATIENT
Start: 2020-01-28

## 2020-01-21 RX ORDER — SODIUM CHLORIDE 9 MG/ML
1000 INJECTION, SOLUTION INTRAVENOUS CONTINUOUS PRN
Status: CANCELLED
Start: 2020-02-04

## 2020-01-21 RX ORDER — ALBUTEROL SULFATE 0.83 MG/ML
2.5 SOLUTION RESPIRATORY (INHALATION)
Status: CANCELLED | OUTPATIENT
Start: 2020-01-21

## 2020-01-21 RX ORDER — EPINEPHRINE 0.3 MG/.3ML
0.3 INJECTION SUBCUTANEOUS EVERY 5 MIN PRN
Status: CANCELLED | OUTPATIENT
Start: 2020-02-11

## 2020-01-21 RX ORDER — EPINEPHRINE 1 MG/ML
0.3 INJECTION, SOLUTION, CONCENTRATE INTRAVENOUS EVERY 5 MIN PRN
Status: CANCELLED | OUTPATIENT
Start: 2020-02-11

## 2020-01-21 RX ORDER — METHYLPREDNISOLONE SODIUM SUCCINATE 125 MG/2ML
125 INJECTION, POWDER, LYOPHILIZED, FOR SOLUTION INTRAMUSCULAR; INTRAVENOUS
Status: CANCELLED
Start: 2020-01-21

## 2020-01-21 RX ORDER — NALOXONE HYDROCHLORIDE 0.4 MG/ML
.1-.4 INJECTION, SOLUTION INTRAMUSCULAR; INTRAVENOUS; SUBCUTANEOUS
Status: CANCELLED | OUTPATIENT
Start: 2020-02-04

## 2020-01-21 RX ORDER — EPINEPHRINE 0.3 MG/.3ML
0.3 INJECTION SUBCUTANEOUS EVERY 5 MIN PRN
Status: CANCELLED | OUTPATIENT
Start: 2020-02-04

## 2020-01-21 RX ORDER — ONDANSETRON 4 MG/1
8 TABLET, FILM COATED ORAL ONCE
Status: COMPLETED | OUTPATIENT
Start: 2020-01-21 | End: 2020-01-21

## 2020-01-21 RX ORDER — LORAZEPAM 2 MG/ML
0.5 INJECTION INTRAMUSCULAR EVERY 4 HOURS PRN
Status: CANCELLED
Start: 2020-01-21

## 2020-01-21 RX ORDER — LORAZEPAM 2 MG/ML
0.5 INJECTION INTRAMUSCULAR EVERY 4 HOURS PRN
Status: CANCELLED
Start: 2020-01-28

## 2020-01-21 RX ORDER — METHYLPREDNISOLONE SODIUM SUCCINATE 125 MG/2ML
125 INJECTION, POWDER, LYOPHILIZED, FOR SOLUTION INTRAMUSCULAR; INTRAVENOUS
Status: CANCELLED
Start: 2020-02-11

## 2020-01-21 RX ORDER — NALOXONE HYDROCHLORIDE 0.4 MG/ML
.1-.4 INJECTION, SOLUTION INTRAMUSCULAR; INTRAVENOUS; SUBCUTANEOUS
Status: CANCELLED | OUTPATIENT
Start: 2020-01-28

## 2020-01-21 RX ORDER — ALBUTEROL SULFATE 90 UG/1
1-2 AEROSOL, METERED RESPIRATORY (INHALATION)
Status: CANCELLED
Start: 2020-01-21

## 2020-01-21 RX ORDER — ALBUTEROL SULFATE 0.83 MG/ML
2.5 SOLUTION RESPIRATORY (INHALATION)
Status: CANCELLED | OUTPATIENT
Start: 2020-02-11

## 2020-01-21 RX ORDER — ONDANSETRON 4 MG/1
8 TABLET, FILM COATED ORAL ONCE
Status: CANCELLED
Start: 2020-01-28

## 2020-01-21 RX ORDER — MEPERIDINE HYDROCHLORIDE 25 MG/ML
25 INJECTION INTRAMUSCULAR; INTRAVENOUS; SUBCUTANEOUS EVERY 30 MIN PRN
Status: CANCELLED | OUTPATIENT
Start: 2020-01-21

## 2020-01-21 RX ORDER — NALOXONE HYDROCHLORIDE 0.4 MG/ML
.1-.4 INJECTION, SOLUTION INTRAMUSCULAR; INTRAVENOUS; SUBCUTANEOUS
Status: CANCELLED | OUTPATIENT
Start: 2020-02-11

## 2020-01-21 RX ORDER — SODIUM CHLORIDE 9 MG/ML
1000 INJECTION, SOLUTION INTRAVENOUS CONTINUOUS PRN
Status: CANCELLED
Start: 2020-02-11

## 2020-01-21 RX ORDER — ZOLEDRONIC ACID 0.04 MG/ML
4 INJECTION, SOLUTION INTRAVENOUS ONCE
Status: CANCELLED | OUTPATIENT
Start: 2020-04-19

## 2020-01-21 RX ORDER — DIPHENHYDRAMINE HYDROCHLORIDE 50 MG/ML
50 INJECTION INTRAMUSCULAR; INTRAVENOUS
Status: CANCELLED
Start: 2020-01-21

## 2020-01-21 RX ORDER — SODIUM CHLORIDE 9 MG/ML
1000 INJECTION, SOLUTION INTRAVENOUS CONTINUOUS PRN
Status: CANCELLED
Start: 2020-01-21

## 2020-01-21 RX ORDER — SODIUM CHLORIDE 9 MG/ML
1000 INJECTION, SOLUTION INTRAVENOUS CONTINUOUS PRN
Status: CANCELLED
Start: 2020-01-28

## 2020-01-21 RX ORDER — ALBUTEROL SULFATE 90 UG/1
1-2 AEROSOL, METERED RESPIRATORY (INHALATION)
Status: CANCELLED
Start: 2020-02-11

## 2020-01-21 RX ORDER — LORAZEPAM 2 MG/ML
0.5 INJECTION INTRAMUSCULAR EVERY 4 HOURS PRN
Status: CANCELLED
Start: 2020-02-04

## 2020-01-21 RX ORDER — EPINEPHRINE 0.3 MG/.3ML
0.3 INJECTION SUBCUTANEOUS EVERY 5 MIN PRN
Status: CANCELLED | OUTPATIENT
Start: 2020-01-21

## 2020-01-21 RX ORDER — MEPERIDINE HYDROCHLORIDE 25 MG/ML
25 INJECTION INTRAMUSCULAR; INTRAVENOUS; SUBCUTANEOUS EVERY 30 MIN PRN
Status: CANCELLED | OUTPATIENT
Start: 2020-02-04

## 2020-01-21 RX ORDER — DIPHENHYDRAMINE HYDROCHLORIDE 50 MG/ML
50 INJECTION INTRAMUSCULAR; INTRAVENOUS
Status: CANCELLED
Start: 2020-02-11

## 2020-01-21 RX ORDER — EPINEPHRINE 1 MG/ML
0.3 INJECTION, SOLUTION, CONCENTRATE INTRAVENOUS EVERY 5 MIN PRN
Status: CANCELLED | OUTPATIENT
Start: 2020-02-04

## 2020-01-21 RX ORDER — METHYLPREDNISOLONE SODIUM SUCCINATE 125 MG/2ML
125 INJECTION, POWDER, LYOPHILIZED, FOR SOLUTION INTRAMUSCULAR; INTRAVENOUS
Status: CANCELLED
Start: 2020-02-04

## 2020-01-21 RX ORDER — LORAZEPAM 2 MG/ML
0.5 INJECTION INTRAMUSCULAR EVERY 4 HOURS PRN
Status: CANCELLED
Start: 2020-02-11

## 2020-01-21 RX ORDER — DIPHENHYDRAMINE HYDROCHLORIDE 50 MG/ML
50 INJECTION INTRAMUSCULAR; INTRAVENOUS
Status: CANCELLED
Start: 2020-01-28

## 2020-01-21 RX ORDER — NALOXONE HYDROCHLORIDE 0.4 MG/ML
.1-.4 INJECTION, SOLUTION INTRAMUSCULAR; INTRAVENOUS; SUBCUTANEOUS
Status: CANCELLED | OUTPATIENT
Start: 2020-01-21

## 2020-01-21 RX ORDER — ALBUTEROL SULFATE 0.83 MG/ML
2.5 SOLUTION RESPIRATORY (INHALATION)
Status: CANCELLED | OUTPATIENT
Start: 2020-02-04

## 2020-01-21 RX ORDER — EPINEPHRINE 0.3 MG/.3ML
0.3 INJECTION SUBCUTANEOUS EVERY 5 MIN PRN
Status: CANCELLED | OUTPATIENT
Start: 2020-01-28

## 2020-01-21 RX ORDER — EPINEPHRINE 1 MG/ML
0.3 INJECTION, SOLUTION, CONCENTRATE INTRAVENOUS EVERY 5 MIN PRN
Status: CANCELLED | OUTPATIENT
Start: 2020-01-21

## 2020-01-21 RX ORDER — ALBUTEROL SULFATE 90 UG/1
1-2 AEROSOL, METERED RESPIRATORY (INHALATION)
Status: CANCELLED
Start: 2020-02-04

## 2020-01-21 RX ORDER — ONDANSETRON 4 MG/1
8 TABLET, FILM COATED ORAL ONCE
Status: CANCELLED
Start: 2020-02-11

## 2020-01-21 RX ADMIN — BORTEZOMIB 2.6 MG: 3.5 INJECTION, POWDER, LYOPHILIZED, FOR SOLUTION INTRAVENOUS; SUBCUTANEOUS at 12:36

## 2020-01-21 RX ADMIN — ONDANSETRON HYDROCHLORIDE 8 MG: 4 TABLET, FILM COATED ORAL at 11:52

## 2020-01-21 RX ADMIN — SODIUM CHLORIDE 250 ML: 9 INJECTION, SOLUTION INTRAVENOUS at 12:36

## 2020-01-21 RX ADMIN — ZOLEDRONIC ACID 4 MG: 0.04 INJECTION, SOLUTION INTRAVENOUS at 12:45

## 2020-01-21 ASSESSMENT — MIFFLIN-ST. JEOR
SCORE: 1192
SCORE: 1192.13

## 2020-01-21 ASSESSMENT — PAIN SCALES - GENERAL
PAINLEVEL: NO PAIN (0)
PAINLEVEL: NO PAIN (0)

## 2020-01-21 NOTE — PATIENT INSTRUCTIONS
We would like to see you back per your schedule.     When you are in need of a refill, please call your pharmacy and they will send us a request.     If you have any questions please call [phone]    Other instructions:  none

## 2020-01-21 NOTE — PROGRESS NOTES
Patient is a 69 year old female here today for injection of Velcade and Zometa infusion per order of . Patient meets parameters for today's infusion. Patient identified with two identifiers, order verified, and verbal consent for today's infusion obtained from patient. Written consent for treatment is on file and valid.    Today's lab values: WBC: 5.6, HGB: 11.1, PLT: 199  ANC: 3.2. Patient meets order parameters for today's treatment.    Patient denies questions or concerns regarding injection and/or medication(s) being administered.    Independent dose check completed with Julia Anderson RN.     Velcade injected SQ into right upper outer arm 45 degree angle per protocol rotating sites.     Covered with a sterile bandage. Pt instructed to leave bandage intact for a minimum of one hour, and to call with questions or concerns.     Injection administered per protocol. Patient tolerated infusion without incident, no signs or symptoms of adverse reaction noted. Patient denies pain or discomfort.     24 gauge angio cath inserted into left posterior wrist.  Immediate blood return noted.  IV secured with sterile, transparent dressing and tape.  Patient tolerated well, denies pain or discomfort at this time.  Flushes easily without resistance, no signs or symptoms of infiltration or infection.   Patient denies questions or concerns regarding infusion and/or medication(s) being administered.    1245: IV pump verified with dose, drug, and rate of administration.  Infusion administered per protocol.  Patient tolerated infusion well, no signs or symptoms of adverse reaction noted.  Patient denies pain nor discomfort.     IV removed, catheter intact.  Site clean, dry and intact.  No signs or symptoms of infiltration or infection.  Covered with a sterile bandage, slight pressure applied for 30 seconds.  Pt instructed to leave bandage intact for a minimum of one hour, and to call with questions or concerns. Patient states  understanding, discharged ambulatory.

## 2020-01-21 NOTE — PATIENT INSTRUCTIONS
We will see you back as planned. If you have any questions or concerns, we can be reached Monday through Friday 8am - 430pm at [phone] (Care Coordinator), if unable to reach them, you can try [phone] ([facility]), [phone] (LPN with Dr Walker), or [phone] (Infusion). If you have concerns related to a potential reaction/side effect after hours/weekends/holiday's, please seek emergent medical care.

## 2020-01-21 NOTE — NURSING NOTE
"Chief Complaint   Patient presents with     RECHECK     Follow up Multiple myeloma not having achieved remission        Initial /68   Pulse 89   Temp 98.5  F (36.9  C) (Tympanic)   Resp 18   Ht 1.6 m (5' 3\")   Wt 70.8 kg (156 lb 1.4 oz)   SpO2 98%   BMI 27.65 kg/m   Estimated body mass index is 27.65 kg/m  as calculated from the following:    Height as of this encounter: 1.6 m (5' 3\").    Weight as of this encounter: 70.8 kg (156 lb 1.4 oz).  Medication Reconciliation: complete.  Immunizations and advance directives status reviewed. Pain scale =0 , PHQ-2=0.            Vilma Chi LPN    "

## 2020-01-28 ENCOUNTER — INFUSION THERAPY VISIT (OUTPATIENT)
Dept: INFUSION THERAPY | Facility: OTHER | Age: 70
End: 2020-01-28
Attending: INTERNAL MEDICINE
Payer: MEDICARE

## 2020-01-28 ENCOUNTER — APPOINTMENT (OUTPATIENT)
Dept: LAB | Facility: OTHER | Age: 70
End: 2020-01-28
Attending: INTERNAL MEDICINE
Payer: MEDICARE

## 2020-01-28 VITALS
WEIGHT: 158.51 LBS | OXYGEN SATURATION: 98 % | BODY MASS INDEX: 28.09 KG/M2 | DIASTOLIC BLOOD PRESSURE: 71 MMHG | RESPIRATION RATE: 16 BRPM | TEMPERATURE: 98.6 F | SYSTOLIC BLOOD PRESSURE: 113 MMHG | HEIGHT: 63 IN

## 2020-01-28 DIAGNOSIS — C90.00 MULTIPLE MYELOMA NOT HAVING ACHIEVED REMISSION (H): Primary | ICD-10-CM

## 2020-01-28 LAB
ALBUMIN SERPL-MCNC: 3.1 G/DL (ref 3.4–5)
ALP SERPL-CCNC: 105 U/L (ref 40–150)
ALT SERPL W P-5'-P-CCNC: 56 U/L (ref 0–50)
AST SERPL W P-5'-P-CCNC: 29 U/L (ref 0–45)
BASOPHILS # BLD AUTO: 0 10E9/L (ref 0–0.2)
BASOPHILS NFR BLD AUTO: 0.2 %
BILIRUB DIRECT SERPL-MCNC: <0.1 MG/DL (ref 0–0.2)
BILIRUB SERPL-MCNC: 0.3 MG/DL (ref 0.2–1.3)
DIFFERENTIAL METHOD BLD: ABNORMAL
EOSINOPHIL # BLD AUTO: 0.1 10E9/L (ref 0–0.7)
EOSINOPHIL NFR BLD AUTO: 2.2 %
ERYTHROCYTE [DISTWIDTH] IN BLOOD BY AUTOMATED COUNT: 14.7 % (ref 10–15)
HCT VFR BLD AUTO: 31.3 % (ref 35–47)
HGB BLD-MCNC: 10.7 G/DL (ref 11.7–15.7)
IMM GRANULOCYTES # BLD: 0.1 10E9/L (ref 0–0.4)
IMM GRANULOCYTES NFR BLD: 1.7 %
LYMPHOCYTES # BLD AUTO: 1.3 10E9/L (ref 0.8–5.3)
LYMPHOCYTES NFR BLD AUTO: 27.4 %
MCH RBC QN AUTO: 31.4 PG (ref 26.5–33)
MCHC RBC AUTO-ENTMCNC: 34.2 G/DL (ref 31.5–36.5)
MCV RBC AUTO: 92 FL (ref 78–100)
MONOCYTES # BLD AUTO: 0.7 10E9/L (ref 0–1.3)
MONOCYTES NFR BLD AUTO: 15.1 %
NEUTROPHILS # BLD AUTO: 2.5 10E9/L (ref 1.6–8.3)
NEUTROPHILS NFR BLD AUTO: 53.4 %
NRBC # BLD AUTO: 0 10*3/UL
NRBC BLD AUTO-RTO: 0 /100
PLATELET # BLD AUTO: 211 10E9/L (ref 150–450)
PROT SERPL-MCNC: 8.6 G/DL (ref 6.8–8.8)
RBC # BLD AUTO: 3.41 10E12/L (ref 3.8–5.2)
WBC # BLD AUTO: 4.6 10E9/L (ref 4–11)

## 2020-01-28 PROCEDURE — 96401 CHEMO ANTI-NEOPL SQ/IM: CPT

## 2020-01-28 PROCEDURE — 80076 HEPATIC FUNCTION PANEL: CPT | Mod: ZL | Performed by: NURSE PRACTITIONER

## 2020-01-28 PROCEDURE — 36415 COLL VENOUS BLD VENIPUNCTURE: CPT | Mod: ZL | Performed by: NURSE PRACTITIONER

## 2020-01-28 PROCEDURE — 25000128 H RX IP 250 OP 636: Mod: JW | Performed by: NURSE PRACTITIONER

## 2020-01-28 PROCEDURE — 85025 COMPLETE CBC W/AUTO DIFF WBC: CPT | Mod: ZL | Performed by: NURSE PRACTITIONER

## 2020-01-28 RX ORDER — ONDANSETRON 4 MG/1
8 TABLET, FILM COATED ORAL ONCE
Status: COMPLETED | OUTPATIENT
Start: 2020-01-28 | End: 2020-01-28

## 2020-01-28 RX ADMIN — BORTEZOMIB 2.6 MG: 3.5 INJECTION, POWDER, LYOPHILIZED, FOR SOLUTION INTRAVENOUS; SUBCUTANEOUS at 14:06

## 2020-01-28 RX ADMIN — ONDANSETRON HYDROCHLORIDE 8 MG: 4 TABLET, FILM COATED ORAL at 14:05

## 2020-01-28 ASSESSMENT — MIFFLIN-ST. JEOR: SCORE: 1203

## 2020-01-28 NOTE — PATIENT INSTRUCTIONS
We will see you back as planned. If you have any questions or concerns, we can be reached Monday through Friday 8am - 430pm at [phone] (Care Coordinator), if unable to reach them, you can try [phone] ([facility]), [phone] (LPN with Dr Walker), or [phone] (Infusion). If you have concerns related to a potential reaction/side effect after hours/weekends/holiday's, please seek emergent medical care.

## 2020-01-28 NOTE — PROGRESS NOTES
Patient is a 69 year old female her today for injection of Velcade per order of . Patient meets parameters for today's infusion. Patient identified with two identifiers, order verified, and verbal consent for today's infusion obtained from patient. Written consent for treatment is on file and valid.    Today's lab values: WBC: 4.6, HGB: 10.7, PLT: 211 ANC: 2.5.     Patient meets order parameters for today's treatment.    Patient denies questions or concerns regarding injection and/or medication(s) being administered.    1406 Velcade injected SQ into left upper outer arm per protocol rotating sites.     Injection administered per protocol. Patient tolerated infusion without incident, no signs or symptoms of adverse reaction noted. Patient denies pain or discomfort.     Covered with a sterile bandage. Pt instructed to leave bandage intact for a minimum of one hour, and to call with questions or concerns. Patient declines copy of appointments, discharge instructions, and after visit summary (AVS). Patient states understanding, discharged ambulatory. Irene Booth RN

## 2020-02-04 ENCOUNTER — APPOINTMENT (OUTPATIENT)
Dept: LAB | Facility: OTHER | Age: 70
End: 2020-02-04
Attending: INTERNAL MEDICINE
Payer: MEDICARE

## 2020-02-04 ENCOUNTER — INFUSION THERAPY VISIT (OUTPATIENT)
Dept: INFUSION THERAPY | Facility: OTHER | Age: 70
End: 2020-02-04
Attending: INTERNAL MEDICINE
Payer: MEDICARE

## 2020-02-04 VITALS
SYSTOLIC BLOOD PRESSURE: 111 MMHG | RESPIRATION RATE: 16 BRPM | HEIGHT: 63 IN | DIASTOLIC BLOOD PRESSURE: 61 MMHG | HEART RATE: 71 BPM | BODY MASS INDEX: 27.77 KG/M2 | WEIGHT: 156.75 LBS | OXYGEN SATURATION: 96 % | TEMPERATURE: 97.9 F

## 2020-02-04 DIAGNOSIS — C90.00 MULTIPLE MYELOMA NOT HAVING ACHIEVED REMISSION (H): Primary | ICD-10-CM

## 2020-02-04 LAB
ALBUMIN SERPL-MCNC: 3.1 G/DL (ref 3.4–5)
ALP SERPL-CCNC: 109 U/L (ref 40–150)
ALT SERPL W P-5'-P-CCNC: 56 U/L (ref 0–50)
AST SERPL W P-5'-P-CCNC: 46 U/L (ref 0–45)
BASOPHILS # BLD AUTO: 0 10E9/L (ref 0–0.2)
BASOPHILS NFR BLD AUTO: 0.3 %
BILIRUB DIRECT SERPL-MCNC: <0.1 MG/DL (ref 0–0.2)
BILIRUB SERPL-MCNC: 0.4 MG/DL (ref 0.2–1.3)
DIFFERENTIAL METHOD BLD: ABNORMAL
EOSINOPHIL # BLD AUTO: 0.1 10E9/L (ref 0–0.7)
EOSINOPHIL NFR BLD AUTO: 1.9 %
ERYTHROCYTE [DISTWIDTH] IN BLOOD BY AUTOMATED COUNT: 15 % (ref 10–15)
HCT VFR BLD AUTO: 32.9 % (ref 35–47)
HGB BLD-MCNC: 11 G/DL (ref 11.7–15.7)
IMM GRANULOCYTES # BLD: 0.1 10E9/L (ref 0–0.4)
IMM GRANULOCYTES NFR BLD: 2.2 %
LYMPHOCYTES # BLD AUTO: 1.1 10E9/L (ref 0.8–5.3)
LYMPHOCYTES NFR BLD AUTO: 19 %
MCH RBC QN AUTO: 31.3 PG (ref 26.5–33)
MCHC RBC AUTO-ENTMCNC: 33.4 G/DL (ref 31.5–36.5)
MCV RBC AUTO: 94 FL (ref 78–100)
MONOCYTES # BLD AUTO: 0.9 10E9/L (ref 0–1.3)
MONOCYTES NFR BLD AUTO: 14.9 %
NEUTROPHILS # BLD AUTO: 3.6 10E9/L (ref 1.6–8.3)
NEUTROPHILS NFR BLD AUTO: 61.7 %
NRBC # BLD AUTO: 0 10*3/UL
NRBC BLD AUTO-RTO: 0 /100
PLATELET # BLD AUTO: 233 10E9/L (ref 150–450)
PROT SERPL-MCNC: 8.6 G/DL (ref 6.8–8.8)
RBC # BLD AUTO: 3.52 10E12/L (ref 3.8–5.2)
WBC # BLD AUTO: 5.8 10E9/L (ref 4–11)

## 2020-02-04 PROCEDURE — 85025 COMPLETE CBC W/AUTO DIFF WBC: CPT | Mod: ZL | Performed by: NURSE PRACTITIONER

## 2020-02-04 PROCEDURE — 96401 CHEMO ANTI-NEOPL SQ/IM: CPT

## 2020-02-04 PROCEDURE — 36415 COLL VENOUS BLD VENIPUNCTURE: CPT | Mod: ZL | Performed by: NURSE PRACTITIONER

## 2020-02-04 PROCEDURE — 25000128 H RX IP 250 OP 636: Performed by: NURSE PRACTITIONER

## 2020-02-04 PROCEDURE — 80076 HEPATIC FUNCTION PANEL: CPT | Mod: ZL | Performed by: NURSE PRACTITIONER

## 2020-02-04 RX ORDER — ONDANSETRON 4 MG/1
8 TABLET, FILM COATED ORAL ONCE
Status: COMPLETED | OUTPATIENT
Start: 2020-02-04 | End: 2020-02-04

## 2020-02-04 RX ORDER — CYCLOPHOSPHAMIDE 50 MG/1
150 CAPSULE ORAL DAILY
Qty: 12 CAPSULE | Refills: 11 | Status: SHIPPED | OUTPATIENT
Start: 2020-02-04 | End: 2020-02-18

## 2020-02-04 RX ADMIN — ONDANSETRON HYDROCHLORIDE 8 MG: 4 TABLET, FILM COATED ORAL at 13:10

## 2020-02-04 RX ADMIN — BORTEZOMIB 2.6 MG: 3.5 INJECTION, POWDER, LYOPHILIZED, FOR SOLUTION INTRAVENOUS; SUBCUTANEOUS at 13:12

## 2020-02-04 ASSESSMENT — MIFFLIN-ST. JEOR: SCORE: 1195

## 2020-02-04 NOTE — PROGRESS NOTES
Patient is a 69 year old female her today for injection of Velcade per order of . Patient meets parameters for today's infusion. Patient identified with two identifiers, order verified, and verbal consent for today's infusion obtained from patient. Written consent for treatment is on file and valid.    Today's lab values: WBC: 5.8, HGB: 11.0, PLT: 233, ANC: 3.6    Patient's lab work ALT: 56, AST 46 reviewed with DAVID Gloria to treat today.    Independent dose check with Julia Anderson RN prior to release of drug.    Patient meets order parameters for today's treatment.  Patient denies questions or concerns regarding injection and/or medication(s) being administered.    1312 Velcade injected SQ into right upper outer arm per protocol rotating sites.     Injection administered per protocol. Patient tolerated infusion without incident, no signs or symptoms of adverse reaction noted. Patient denies pain or discomfort.     Covered with a sterile bandage. Pt instructed to leave bandage intact for a minimum of one hour, and to call with questions or concerns. Patient declines copy of appointments, discharge instructions, and after visit summary (AVS). Patient states understanding, discharged ambulatory. Irene Booth RN

## 2020-02-11 ENCOUNTER — APPOINTMENT (OUTPATIENT)
Dept: LAB | Facility: OTHER | Age: 70
End: 2020-02-11
Attending: INTERNAL MEDICINE
Payer: MEDICARE

## 2020-02-11 ENCOUNTER — INFUSION THERAPY VISIT (OUTPATIENT)
Dept: INFUSION THERAPY | Facility: OTHER | Age: 70
End: 2020-02-11
Attending: INTERNAL MEDICINE
Payer: MEDICARE

## 2020-02-11 VITALS
BODY MASS INDEX: 27.77 KG/M2 | SYSTOLIC BLOOD PRESSURE: 111 MMHG | HEART RATE: 85 BPM | HEIGHT: 63 IN | RESPIRATION RATE: 16 BRPM | WEIGHT: 156.75 LBS | DIASTOLIC BLOOD PRESSURE: 67 MMHG | OXYGEN SATURATION: 95 % | TEMPERATURE: 98.1 F

## 2020-02-11 DIAGNOSIS — C90.00 MULTIPLE MYELOMA NOT HAVING ACHIEVED REMISSION (H): Primary | ICD-10-CM

## 2020-02-11 LAB
ALBUMIN SERPL-MCNC: 3.2 G/DL (ref 3.4–5)
ALP SERPL-CCNC: 113 U/L (ref 40–150)
ALT SERPL W P-5'-P-CCNC: 61 U/L (ref 0–50)
AST SERPL W P-5'-P-CCNC: 34 U/L (ref 0–45)
BASOPHILS # BLD AUTO: 0 10E9/L (ref 0–0.2)
BASOPHILS NFR BLD AUTO: 0.4 %
BILIRUB DIRECT SERPL-MCNC: <0.1 MG/DL (ref 0–0.2)
BILIRUB SERPL-MCNC: 0.4 MG/DL (ref 0.2–1.3)
DIFFERENTIAL METHOD BLD: ABNORMAL
EOSINOPHIL # BLD AUTO: 0.1 10E9/L (ref 0–0.7)
EOSINOPHIL NFR BLD AUTO: 1.6 %
ERYTHROCYTE [DISTWIDTH] IN BLOOD BY AUTOMATED COUNT: 14.8 % (ref 10–15)
HCT VFR BLD AUTO: 32.6 % (ref 35–47)
HGB BLD-MCNC: 11.1 G/DL (ref 11.7–15.7)
IMM GRANULOCYTES # BLD: 0.1 10E9/L (ref 0–0.4)
IMM GRANULOCYTES NFR BLD: 1.8 %
LYMPHOCYTES # BLD AUTO: 1.4 10E9/L (ref 0.8–5.3)
LYMPHOCYTES NFR BLD AUTO: 25.1 %
MCH RBC QN AUTO: 31.5 PG (ref 26.5–33)
MCHC RBC AUTO-ENTMCNC: 34 G/DL (ref 31.5–36.5)
MCV RBC AUTO: 93 FL (ref 78–100)
MONOCYTES # BLD AUTO: 0.9 10E9/L (ref 0–1.3)
MONOCYTES NFR BLD AUTO: 15.8 %
NEUTROPHILS # BLD AUTO: 3.1 10E9/L (ref 1.6–8.3)
NEUTROPHILS NFR BLD AUTO: 55.3 %
NRBC # BLD AUTO: 0 10*3/UL
NRBC BLD AUTO-RTO: 0 /100
PLATELET # BLD AUTO: 227 10E9/L (ref 150–450)
PROT SERPL-MCNC: 8.8 G/DL (ref 6.8–8.8)
RBC # BLD AUTO: 3.52 10E12/L (ref 3.8–5.2)
WBC # BLD AUTO: 5.6 10E9/L (ref 4–11)

## 2020-02-11 PROCEDURE — 96401 CHEMO ANTI-NEOPL SQ/IM: CPT

## 2020-02-11 PROCEDURE — 25000128 H RX IP 250 OP 636: Mod: JW | Performed by: NURSE PRACTITIONER

## 2020-02-11 PROCEDURE — 82784 ASSAY IGA/IGD/IGG/IGM EACH: CPT | Mod: ZL | Performed by: NURSE PRACTITIONER

## 2020-02-11 PROCEDURE — 84165 PROTEIN E-PHORESIS SERUM: CPT | Mod: ZL | Performed by: NURSE PRACTITIONER

## 2020-02-11 PROCEDURE — 86334 IMMUNOFIX E-PHORESIS SERUM: CPT | Mod: ZL | Performed by: NURSE PRACTITIONER

## 2020-02-11 PROCEDURE — 83883 ASSAY NEPHELOMETRY NOT SPEC: CPT | Mod: ZL | Performed by: NURSE PRACTITIONER

## 2020-02-11 PROCEDURE — 80076 HEPATIC FUNCTION PANEL: CPT | Mod: ZL | Performed by: NURSE PRACTITIONER

## 2020-02-11 PROCEDURE — 85025 COMPLETE CBC W/AUTO DIFF WBC: CPT | Mod: ZL | Performed by: NURSE PRACTITIONER

## 2020-02-11 PROCEDURE — 36415 COLL VENOUS BLD VENIPUNCTURE: CPT | Mod: ZL | Performed by: NURSE PRACTITIONER

## 2020-02-11 PROCEDURE — 00000402 ZZHCL STATISTIC TOTAL PROTEIN: Mod: ZL | Performed by: NURSE PRACTITIONER

## 2020-02-11 RX ORDER — ONDANSETRON 4 MG/1
8 TABLET, FILM COATED ORAL ONCE
Status: COMPLETED | OUTPATIENT
Start: 2020-02-11 | End: 2020-02-11

## 2020-02-11 RX ADMIN — ONDANSETRON HYDROCHLORIDE 8 MG: 4 TABLET, FILM COATED ORAL at 13:55

## 2020-02-11 RX ADMIN — BORTEZOMIB 2.6 MG: 3.5 INJECTION, POWDER, LYOPHILIZED, FOR SOLUTION INTRAVENOUS; SUBCUTANEOUS at 13:56

## 2020-02-11 ASSESSMENT — MIFFLIN-ST. JEOR: SCORE: 1194.97

## 2020-02-11 ASSESSMENT — PAIN SCALES - GENERAL: PAINLEVEL: NO PAIN (0)

## 2020-02-11 NOTE — PROGRESS NOTES
Patient is a 69 year old female here today for injection of Velcade per order of . Patient meets parameters for today's infusion. Patient identified with two identifiers, order verified, and verbal consent for today's infusion obtained from patient. Written consent for treatment is on file and valid.    Recent lab values: WBC: 5.6, HGB: 11.1, PLT: 227  ANC: 3.1. Patient meets order parameters for today's treatment.  Patient denies questions or concerns regarding injection and/or medication(s) being administered.    Velcade injected SQ into left upper outer arm per protocol rotating sites.     Injection administered per protocol. Patient tolerated infusion without incident, no signs or symptoms of adverse reaction noted. Patient denies pain or discomfort.     Covered with a sterile bandage. Pt instructed to leave bandage intact for a minimum of one hour, and to call with questions or concerns. Copy of appointments, discharge instructions, and after visit summary (AVS) provided to patient. Patient states understanding, discharged ambulatory.

## 2020-02-11 NOTE — PROGRESS NOTES
Subjective     Carmelita Watts is a 69 year old female who presents to clinic today for the following health issues:    HPI   Hyperlipidemia Follow-Up      Are you regularly taking any medication or supplement to lower your cholesterol?   Yes- atorvastatin    Are you having muscle aches or other side effects that you think could be caused by your cholesterol lowering medication?  No    Depression and Anxiety Follow-Up    How are you doing with your depression since your last visit? No change    How are you doing with your anxiety since your last visit?  No change    Are you having other symptoms that might be associated with depression or anxiety? No    Have you had a significant life event? Health Concerns     Do you have any concerns with your use of alcohol or other drugs? No    Social History     Tobacco Use     Smoking status: Never Smoker     Smokeless tobacco: Never Used     Tobacco comment: Tried to Quit (YES); QUIT in 1971; Passive Exposure (NO)   Substance Use Topics     Alcohol use: Yes     Comment: RARELY     Drug use: No     PHQ 11/14/2019 11/26/2019 12/20/2019   PHQ-9 Total Score 3 3 2   Q9: Thoughts of better off dead/self-harm past 2 weeks Not at all Not at all Not at all     MINAL-7 SCORE 1/3/2019 10/3/2019 11/14/2019   Total Score 3 2 3     Last PHQ-9 12/20/2019   1.  Little interest or pleasure in doing things 0   2.  Feeling down, depressed, or hopeless 0   3.  Trouble falling or staying asleep, or sleeping too much 0   4.  Feeling tired or having little energy 1   5.  Poor appetite or overeating 1   6.  Feeling bad about yourself 0   7.  Trouble concentrating 0   8.  Moving slowly or restless 0   Q9: Thoughts of better off dead/self-harm past 2 weeks 0   PHQ-9 Total Score 2   Difficulty at work, home, or with people Somewhat difficult     MINAL-7  11/14/2019   1. Feeling nervous, anxious, or on edge 1   2. Not being able to stop or control worrying 0   3. Worrying too much about different things 1    4. Trouble relaxing 0   5. Being so restless that it is hard to sit still 0   6. Becoming easily annoyed or irritable 1   7. Feeling afraid, as if something awful might happen 0   MINAL-7 Total Score 3   If you checked any problems, how difficult have they made it for you to do your work, take care of things at home, or get along with other people? -         Suicide Assessment Five-step Evaluation and Treatment (SAFE-T)      How many servings of fruits and vegetables do you eat daily?  4 or more    On average, how many sweetened beverages do you drink each day (Examples: soda, juice, sweet tea, etc.  Do NOT count diet or artificially sweetened beverages)?   0    How many days per week do you exercise enough to make your heart beat faster? 3 or less    How many minutes a day do you exercise enough to make your heart beat faster? 20 - 29    How many days per week do you miss taking your medication? 0        Patient Active Problem List   Diagnosis     Hyperlipidemia LDL goal <130     Hypertension goal BP (blood pressure) < 140/80     Advanced care planning/counseling discussion     Major depressive disorder, recurrent episode, mild (H)     Seasonal allergies     Mixed hyperlipidemia     ACP (advance care planning)     Multiple myeloma not having achieved remission (H)     Past Surgical History:   Procedure Laterality Date     APPENDECTOMY       BONE MARROW BIOPSY, BONE SPECIMEN, NEEDLE/TROCAR N/A 6/18/2019    Procedure: BONE MARROW BIOPSY;  Surgeon: Maciej Sanz MD;  Location: [facility]     CHOLECYSTECTOMY       COLONOSCOPY  07-    repeat 10 years     COLONOSCOPY N/A 12/30/2016    Procedure: COLONOSCOPY;  Surgeon: Bhaskar Franlkin DO;  Location: [facility]     SINUS SURGERY       TUBAL STERILIZATION         Social History     Tobacco Use     Smoking status: Never Smoker     Smokeless tobacco: Never Used     Tobacco comment: Tried to Quit (YES); QUIT in 1971; Passive Exposure (NO)   Substance Use Topics      "Alcohol use: Yes     Comment: RARELY     Family History   Problem Relation Age of Onset     Breast Cancer Mother 66        Cause of Death     Parkinsonism Father         \"Possible\"     Coronary Artery Disease Father      Pacemaker Father      Thyroid Disease Daughter      Diabetes No family hx of      Hypertension No family hx of      Hyperlipidemia No family hx of      Cerebrovascular Disease No family hx of      Colon Cancer No family hx of      Prostate Cancer No family hx of      Genetic Disorder No family hx of      Asthma No family hx of      Anesthesia Reaction No family hx of          Current Outpatient Medications   Medication Sig Dispense Refill     acyclovir (ZOVIRAX) 400 MG tablet Take 1 tablet (400 mg) by mouth 2 times daily Viral Prophylaxis. 60 tablet 3     atorvastatin (LIPITOR) 10 MG tablet Take 1 tablet (10 mg) by mouth daily 90 tablet 3     Bortezomib (VELCADE IJ)        cyclophosphamide (CYTOXAN) 50 MG capsule Take 3 capsules (150 mg) by mouth daily On days 1, 8, 15, 22 12 capsule 11     cyclophosphamide (CYTOXAN) 50 MG capsule Take 3 capsules (150mg) by mouth daily on days 1,8,15,and 22. 12 capsule 11     dexamethasone (DECADRON) 4 MG tablet TAKE 10 TABLETS BY MOUTH DAILY ON DAYS 1, 8, 15, AND 22 120 tablet 3     EPINEPHrine (EPIPEN) 0.3 MG/0.3ML injection Inject 0.3 mg into the muscle once as needed. Use as needed for anaphylaxis.       fluticasone (FLONASE) 50 MCG/ACT nasal spray SPRAY 2 SPRAYS INTO BOTH NOSTRILS DAILY 48 mL 0     LORazepam (ATIVAN) 0.5 MG tablet Take 1 tablet (0.5 mg) by mouth every 4 hours as needed (Anxiety, Nausea/Vomiting or Sleep) 30 tablet 2     losartan (COZAAR) 50 MG tablet Take 1 tablet (50 mg) by mouth daily 90 tablet 3     ondansetron (ZOFRAN) 8 MG tablet Take 1 tablet (8 mg) by mouth every 8 hours as needed (Nausea/Vomiting) 10 tablet 2     prochlorperazine (COMPAZINE) 10 MG tablet Take 1 tablet (10 mg) by mouth every 6 hours as needed (Nausea/Vomiting) 30 tablet " "3     sertraline (ZOLOFT) 50 MG tablet Take 1 tablet (50 mg) by mouth daily 30 tablet 3     Allergies   Allergen Reactions     Lisinopril Cough     Phenylephrine Hcl Other (See Comments)     **Entex  HEADACHE (SEVERE)     Phenylpropanolamine Other (See Comments)     **Entex  HEADACHE (SEVERE)     Pseudoephedrine Tannate Other (See Comments)     **Entex  HEADACHE (SEVERE)     Levofloxacin Rash     **Levaquin     Moxifloxacin Hcl [Avelox] Rash     Recent Labs   Lab Test 02/11/20  1255 02/04/20  1115 01/28/20  1250 01/21/20  1025  12/23/19  1350  01/03/19  1043  11/17/17  0844 10/26/16  0921   LDL  --   --   --   --   --   --   --  86  --  108* 109*   HDL  --   --   --   --   --   --   --  42*  --  60 54   TRIG  --   --   --   --   --   --   --  126  --  78 115   ALT 61* 56* 56* 99*   < > 51*   < >  --    < > 27 21   CR  --   --   --  0.63  --  0.64   < >  --    < > 0.69 0.78   GFRESTIMATED  --   --   --  >90  --  90   < >  --    < > 84 74   GFRESTBLACK  --   --   --  >90  --  >90   < >  --    < > >90 90   POTASSIUM  --   --   --  4.0  --  3.6   < >  --    < > 4.1 3.8   TSH  --   --   --   --   --   --   --   --   --  1.77 2.08    < > = values in this interval not displayed.      BP Readings from Last 3 Encounters:   02/14/20 110/76   02/11/20 111/67   02/04/20 111/61    Wt Readings from Last 3 Encounters:   02/14/20 70.8 kg (156 lb 2 oz)   02/11/20 71.1 kg (156 lb 12 oz)   02/04/20 71.1 kg (156 lb 12 oz)                    Reviewed and updated as needed this visit by Provider         Review of Systems   ROS COMP: Constitutional, HEENT, cardiovascular, pulmonary, GI, , musculoskeletal, neuro, skin, endocrine and psych systems are negative, except as otherwise noted.      Objective    /76 (BP Location: Right arm, Patient Position: Sitting, Cuff Size: Adult Regular)   Pulse 86   Temp 97.9  F (36.6  C) (Tympanic)   Ht 1.575 m (5' 2\")   Wt 70.8 kg (156 lb 2 oz)   SpO2 95%   BMI 28.56 kg/m    Body mass index " is 28.56 kg/m .  Physical Exam   GENERAL: healthy, alert and no distress  EYES: Eyes grossly normal to inspection, PERRL and conjunctivae and sclerae normal  HENT: ear canals and TM's normal, nose and mouth without ulcers or lesions  NECK: no adenopathy, no asymmetry, masses, or scars and thyroid normal to palpation  RESP: lungs clear to auscultation - no rales, rhonchi or wheezes  CV: regular rate and rhythm, normal S1 S2, no S3 or S4, no murmur, click or rub, no peripheral edema and peripheral pulses strong  MS: no gross musculoskeletal defects noted, no edema  PSYCH: mentation appears normal, affect normal/bright    Diagnostic Test Results:  Labs reviewed in Epic  No results found for this or any previous visit (from the past 24 hour(s)).        Assessment & Plan     1. Current moderate episode of major depressive disorder without prior episode (H)  She is doing well. Her  has pancreatic cancer and she has myeloma. Exhaustion is something she fights.  Still staying busy and very much trying to watch her health. Her numbers are better.  Wanting to keep in touch with us every three months.    2. MINAL (generalized anxiety disorder)  She has her anxiety in check.  working well with oncology and other subspeciality. Her family is supportive.   No labs needed seeing oncology. She likes to come in and check in with us every 3 months or so to keep me updated on all the changes.  Has a lot going on with her  and her.   Feeling more upbeat on leaving the clinic. Leave dose of Zoloft the same.          3. Mixed hyperlipidemia  Recheck labs. These are the ones not done by the oncology.   - Lipid Profile  - TSH with free T4 reflex  - atorvastatin (LIPITOR) 10 MG tablet; Take 1 tablet (10 mg) by mouth daily  Dispense: 90 tablet; Refill: 3    4. Essential hypertension with goal blood pressure less than 140/90  Her pressure is in range. Needing a refill of this.   - losartan (COZAAR) 50 MG tablet; Take 1 tablet (50  mg) by mouth daily  Dispense: 90 tablet; Refill: 3    5. Multiple myeloma not having achieved remission (H)  Wants a refill on her zovirax. Note is running out.  Will continue.   - acyclovir (ZOVIRAX) 400 MG tablet; Take 1 tablet (400 mg) by mouth 2 times daily Viral Prophylaxis.  Dispense: 60 tablet; Refill: 3      See Patient Instructions    No follow-ups on file.    VENKATESH Trevino  [facility]

## 2020-02-12 DIAGNOSIS — C90.00 MULTIPLE MYELOMA NOT HAVING ACHIEVED REMISSION (H): ICD-10-CM

## 2020-02-12 RX ORDER — DEXAMETHASONE 4 MG/1
TABLET ORAL
Qty: 120 TABLET | Refills: 3 | Status: SHIPPED | OUTPATIENT
Start: 2020-02-12 | End: 2020-12-10

## 2020-02-12 NOTE — TELEPHONE ENCOUNTER
decadron      Last Written Prescription Date:  6/24/19  Last Fill Quantity: 30,   # refills: 3  Last Office Visit: 1/21/20  Future Office visit:    Next 5 appointments (look out 90 days)    Feb 14, 2020  8:40 AM CST  (Arrive by 8:25 AM)  [facility] VENKATESH Stevens  [facility] [address] [facility] [address]  [phone]   Feb 18, 2020  1:30 PM CST  (Arrive by 1:15 PM)  Return Visit with Barbie Quintana NP  [facility] ([facility] ) [address] [facility] [address]  [phone]   Mar 17, 2020  9:45 AM CDT  (Arrive by 9:30 AM)  Return Visit with Barbie Quintana NP  [facility] ([facility] ) [address] [place] [address]  [phone]   May 11, 2020  8:30 AM CDT  (Arrive by 8:15 AM)  Return Visit with Olive Campbell NP  [facility] ([facility] ) [address]  [facility] [address]  [phone]           Routing refill request to provider for review/approval because:  Drug not on the [facility], P or [facility] refill protocol or controlled substance

## 2020-02-13 LAB
IGA SERPL-MCNC: 10 MG/DL (ref 84–499)
IGG SERPL-MCNC: 3117 MG/DL (ref 610–1616)
IGM SERPL-MCNC: 12 MG/DL (ref 35–242)
KAPPA LC UR-MCNC: 0.67 MG/DL (ref 0.33–1.94)
KAPPA LC/LAMBDA SER: 3.05 {RATIO} (ref 0.26–1.65)
LAMBDA LC SERPL-MCNC: 0.22 MG/DL (ref 0.57–2.63)

## 2020-02-14 ENCOUNTER — OFFICE VISIT (OUTPATIENT)
Dept: FAMILY MEDICINE | Facility: OTHER | Age: 70
End: 2020-02-14
Attending: PHYSICIAN ASSISTANT
Payer: COMMERCIAL

## 2020-02-14 VITALS
WEIGHT: 156.13 LBS | TEMPERATURE: 97.9 F | OXYGEN SATURATION: 95 % | SYSTOLIC BLOOD PRESSURE: 110 MMHG | HEIGHT: 62 IN | BODY MASS INDEX: 28.73 KG/M2 | HEART RATE: 86 BPM | DIASTOLIC BLOOD PRESSURE: 76 MMHG

## 2020-02-14 DIAGNOSIS — E78.2 MIXED HYPERLIPIDEMIA: ICD-10-CM

## 2020-02-14 DIAGNOSIS — F41.1 GAD (GENERALIZED ANXIETY DISORDER): ICD-10-CM

## 2020-02-14 DIAGNOSIS — I10 ESSENTIAL HYPERTENSION WITH GOAL BLOOD PRESSURE LESS THAN 140/90: ICD-10-CM

## 2020-02-14 DIAGNOSIS — F32.1 CURRENT MODERATE EPISODE OF MAJOR DEPRESSIVE DISORDER WITHOUT PRIOR EPISODE (H): ICD-10-CM

## 2020-02-14 DIAGNOSIS — C90.00 MULTIPLE MYELOMA NOT HAVING ACHIEVED REMISSION (H): ICD-10-CM

## 2020-02-14 LAB
ALBUMIN SERPL ELPH-MCNC: 4 G/DL (ref 3.7–5.1)
ALPHA1 GLOB SERPL ELPH-MCNC: 0.3 G/DL (ref 0.2–0.4)
ALPHA2 GLOB SERPL ELPH-MCNC: 0.7 G/DL (ref 0.5–0.9)
B-GLOBULIN SERPL ELPH-MCNC: 0.6 G/DL (ref 0.6–1)
CHOLEST SERPL-MCNC: 223 MG/DL
GAMMA GLOB SERPL ELPH-MCNC: 2.8 G/DL (ref 0.7–1.6)
HDLC SERPL-MCNC: 44 MG/DL
LDLC SERPL CALC-MCNC: 125 MG/DL
M PROTEIN SERPL ELPH-MCNC: 2.6 G/DL
NONHDLC SERPL-MCNC: 179 MG/DL
PROT PATTERN SERPL ELPH-IMP: ABNORMAL
PROT PATTERN SERPL IFE-IMP: NORMAL
TRIGL SERPL-MCNC: 270 MG/DL
TSH SERPL DL<=0.005 MIU/L-ACNC: 1.86 MU/L (ref 0.4–4)

## 2020-02-14 PROCEDURE — 80061 LIPID PANEL: CPT | Mod: ZL | Performed by: INTERNAL MEDICINE

## 2020-02-14 PROCEDURE — G0463 HOSPITAL OUTPT CLINIC VISIT: HCPCS

## 2020-02-14 PROCEDURE — 84443 ASSAY THYROID STIM HORMONE: CPT | Mod: ZL | Performed by: INTERNAL MEDICINE

## 2020-02-14 PROCEDURE — 99214 OFFICE O/P EST MOD 30 MIN: CPT | Performed by: PHYSICIAN ASSISTANT

## 2020-02-14 PROCEDURE — 36415 COLL VENOUS BLD VENIPUNCTURE: CPT | Mod: ZL | Performed by: INTERNAL MEDICINE

## 2020-02-14 RX ORDER — ACYCLOVIR 400 MG/1
400 TABLET ORAL 2 TIMES DAILY
Qty: 60 TABLET | Refills: 3 | Status: SHIPPED | OUTPATIENT
Start: 2020-02-14 | End: 2020-05-27

## 2020-02-14 RX ORDER — ATORVASTATIN CALCIUM 10 MG/1
10 TABLET, FILM COATED ORAL DAILY
Qty: 90 TABLET | Refills: 3 | Status: SHIPPED | OUTPATIENT
Start: 2020-02-14 | End: 2020-05-27

## 2020-02-14 RX ORDER — LOSARTAN POTASSIUM 50 MG/1
50 TABLET ORAL DAILY
Qty: 90 TABLET | Refills: 3 | Status: SHIPPED | OUTPATIENT
Start: 2020-02-14 | End: 2020-05-27

## 2020-02-14 ASSESSMENT — MIFFLIN-ST. JEOR: SCORE: 1176.43

## 2020-02-14 ASSESSMENT — PAIN SCALES - GENERAL: PAINLEVEL: NO PAIN (0)

## 2020-02-14 NOTE — NURSING NOTE
"Chief Complaint   Patient presents with     Recheck Medication       Initial /76 (BP Location: Right arm, Patient Position: Sitting, Cuff Size: Adult Regular)   Pulse 86   Temp 97.9  F (36.6  C) (Tympanic)   Ht 1.575 m (5' 2\")   Wt 70.8 kg (156 lb 2 oz)   SpO2 95%   BMI 28.56 kg/m   Estimated body mass index is 28.56 kg/m  as calculated from the following:    Height as of this encounter: 1.575 m (5' 2\").    Weight as of this encounter: 70.8 kg (156 lb 2 oz).  Medication Reconciliation: complete  Marlyn Loo LPN  "

## 2020-02-14 NOTE — PATIENT INSTRUCTIONS
Thank you for choosing [facility].   I have office hours 8:00 am to 4:30 pm on Monday's, Wednesday's, Thursday's and Friday's. My nurse and I are out of the office every Tuesday.    Following your visit, when your labs and diagnostic testing have returned, I will review then and you will be contacted by my nurse.  If you are on My Chart, you can also view results there.    For refills, notify your pharmacy regarding what you need and the pharmacy will generate a refill request. Do not call my nurse as she is unable to process refill request. Please plan ahead and allow 3-5 days for refill requests.    You will generally receive a reminder call the day prior to your appointment.  If you cannot attend your appointment, please cancel your appointment with as much notice as possible.  If there is a pattern of failure to present for your appointments, I cannot provide consistent, meaningful, ongoing care for you. It is very important to me that you come in for your care, so we can best assist you with your health care needs.    IMPORTANT:  Please note that it is my standard of practice to NOT participate in prescribing ongoing requested Narcotic Analgesic therapy, and/or participate in the prescribing of other controlled substances.  My nurse and I am happy to assist you with the process of referral for alternative pain management as needed, and other treatment modalities including but not limited to:  Physical Therapy, Physical Medicine and Rehab, Counseling, Chiropractic Care, Orthopedic Care, and non-narcotic medication management.     In the event that you need to be seen for emergent concerns and I am out of office,  please see one of my colleagues for acute concerns.  You may also present to  or ER.  I appreciate the opportunity to serve you and look forward to supporting your healthcare needs in the future. Please contact me with any questions or concerns that you may  have.    Sincerely,      Kenyatta Trujillo RN, PA-C

## 2020-02-17 NOTE — PROGRESS NOTES
Oncology Follow-up Visit:  February 18, 2020    Reason for Visit:  Patient presents with:  RECHECK: Follow up Multiple myeloma      Nursing Note and documentation reviewed: yes    HPI:  This is a 69-year-old female patient who presents to the oncology clinic today for evaluation prior to receiving cycle 6 chemotherapy for Stage II-RISS IgG multiple myeloma diagnosed June 2019.  She progressed on Velcade and dexamethasone and is now receiving CyBorD with decreased dose of cytoxan related to LFT elevation.     She presents to the clinic today stating she overall is doing fairly well.  She states she does have some increased fatigue but admits that she has been probably doing too much.  She continues with some shortness of breath that is mainly with exertion.  She will be following up with ENT in May and states she feels her hearing has been pretty good.    Oncologic History:     12/31/2018   patient presented to the emergency room with vertigo and fatigue.  CT scan of the head was negative and subsequent stress test was negative.  5/3/2019  She was seen by her PCP who ordered lab work and noted a total protein of 12.9.  SPEP at that time showed an M spike of 6.2 with a large monoclonal protein seen in the gamma fraction.  Urine immunofixation showed a possible small protein band in the gamma fraction  5/31/2019  she was evaluated by Dr. Walker with Medical Oncology with plan to rule out myeloma and obtain bone marrow aspiration biopsy as well as a metastatic bone survey along with additional labwork  6/18/2019 she underwent bone marrow aspiration and biopsy  6/24/2019  She was seen again by Dr. Walker and CBC showed a hemoglobin of 9.3, M spike 7.3 with monoclonal IgG immunoglobulin of kappa light chain type; serum viscosity was 2.9; quantitative immunoglobulins showed an IgG of 8160, beta-2 microglobulin was 5.8, BUN was 21 with creatinine is 0.8 and total protein was 13.7.  Quantitative kappa/lambda free light  chains showed an elevated ratio of 17.0 bone marrow aspiration biopsy showed plasma cell myeloma with approximately 80% plasma cells.  Immunofixation showed IgG kappa and flow cytometry revealed kappa monotypic plasma cells consistent with clonal plasma cell neoplasm and FISH panel was pending at that time.  It was felt she had at least stage II disease based on her beta-2 microglobulin and anemia.  Plan was to treat with Velcade 1.3 mg per metered squared days 1, 4, 8 and 11/Decadron 40 mg on days 1, 8 and 15 initiation of Revlimid with the second cycle at 25 mg daily days 1 through 14.  Plan was to also obtain an MRI of the lumbar spine to rule out lytic lesion at L3.  She was initiated on Zovirax 400 mg p.o. twice daily.  6/25/2019  1st cycle of chemotherapy initiated  7/1/2019 note in chart regarding patient's large co-pay for the Revlimid and no plan at this point to initiate Revlimid and treat only with Velcade and Decadron per Dr. Walker  7/11/2019  MRI lumbar spine shows a pathologic superior endplate compression fracture at L3 without evidence of retropulsed fragment and innumerable enhancing lesions throughout the lumbar spine consistent with history of multiple myeloma.  9/10/2019  Increasing M-spike and kappa lambda ratio  10/1/2019  Initiation of CyBorD     Current Chemo Regime/TX:  Velcade 1.5mg.m2/cyclophosphamide 150mg every 7 days on days 1,8,15 and 22/decadron 40mg days 1,8,15,22       **Zometa 4mg every 3 months  Current Cycle: 6  # of completed cycles:  5     Previous treatment:   Velcade 1.3 mg per metered squared on days 1, 4, 8 and 11 with Decadron 40 mg on days 1, 8 and 15 x 4 cycles     Past Medical History:   Diagnosis Date     Arthritis      Depressive disorder      Essential hypertension 10/1/2015     Major depressive disorder, recurrent episode, mild (H) 10/1/2015     Mixed hyperlipidemia 10/1/2015     Multiple myeloma not having achieved remission (H) 6/24/2019     Other specified  "disorders of bladder 07/09/2012    Irritable Bladder     Seasonal allergies 10/1/2015     Unspecified essential hypertension 03/19/2007     Unspecified sinusitis (chronic) 09/05/2007       Past Surgical History:   Procedure Laterality Date     APPENDECTOMY       BONE MARROW BIOPSY, BONE SPECIMEN, NEEDLE/TROCAR N/A 6/18/2019    Procedure: BONE MARROW BIOPSY;  Surgeon: Maciej Sanz MD;  Location: [facility]     CHOLECYSTECTOMY       COLONOSCOPY  07-    repeat 10 years     COLONOSCOPY N/A 12/30/2016    Procedure: COLONOSCOPY;  Surgeon: Bhaskar Franklin DO;  Location: [facility]     SINUS SURGERY       TUBAL STERILIZATION         Family History   Problem Relation Age of Onset     Breast Cancer Mother 66        Cause of Death     Parkinsonism Father         \"Possible\"     Coronary Artery Disease Father      Pacemaker Father      Thyroid Disease Daughter      Diabetes No family hx of      Hypertension No family hx of      Hyperlipidemia No family hx of      Cerebrovascular Disease No family hx of      Colon Cancer No family hx of      Prostate Cancer No family hx of      Genetic Disorder No family hx of      Asthma No family hx of      Anesthesia Reaction No family hx of        Social History     Socioeconomic History     Marital status:      Spouse name: Not on file     Number of children: Not on file     Years of education: Not on file     Highest education level: Not on file   Occupational History     Occupation: Financial     Comment:  - (FT)   Social Needs     Financial resource strain: Not on file     Food insecurity:     Worry: Not on file     Inability: Not on file     Transportation needs:     Medical: Not on file     Non-medical: Not on file   Tobacco Use     Smoking status: Never Smoker     Smokeless tobacco: Never Used     Tobacco comment: Tried to Quit (YES); QUIT in 1971; Passive Exposure (NO)   Substance and Sexual Activity     Alcohol use: Yes     Comment: RARELY     Drug use: " No     Sexual activity: Yes     Partners: Male     Birth control/protection: None   Lifestyle     Physical activity:     Days per week: Not on file     Minutes per session: Not on file     Stress: Not on file   Relationships     Social connections:     Talks on phone: Not on file     Gets together: Not on file     Attends Christian service: Not on file     Active member of club or organization: Not on file     Attends meetings of clubs or organizations: Not on file     Relationship status: Not on file     Intimate partner violence:     Fear of current or ex partner: Not on file     Emotionally abused: Not on file     Physically abused: Not on file     Forced sexual activity: Not on file   Other Topics Concern      Service Not Asked     Blood Transfusions Not Asked     Caffeine Concern Yes     Comment: Coffee >6 cups daily     Occupational Exposure Not Asked     Hobby Hazards Not Asked     Sleep Concern Not Asked     Stress Concern Not Asked     Weight Concern Not Asked     Special Diet Not Asked     Back Care Not Asked     Exercise Not Asked     Bike Helmet Not Asked     Seat Belt Not Asked     Self-Exams Not Asked     Parent/sibling w/ CABG, MI or angioplasty before 65F 55M? No   Social History Narrative     Not on file       Current Outpatient Medications   Medication     acyclovir (ZOVIRAX) 400 MG tablet     atorvastatin (LIPITOR) 10 MG tablet     Bortezomib (VELCADE IJ)     cyclophosphamide (CYTOXAN) 50 MG capsule     dexamethasone (DECADRON) 4 MG tablet     fluticasone (FLONASE) 50 MCG/ACT nasal spray     losartan (COZAAR) 50 MG tablet     sertraline (ZOLOFT) 50 MG tablet     EPINEPHrine (EPIPEN) 0.3 MG/0.3ML injection     LORazepam (ATIVAN) 0.5 MG tablet     ondansetron (ZOFRAN) 8 MG tablet     prochlorperazine (COMPAZINE) 10 MG tablet     No current facility-administered medications for this visit.         Allergies   Allergen Reactions     Lisinopril Cough     Phenylephrine Hcl Other (See Comments)  "    **Entex  HEADACHE (SEVERE)     Phenylpropanolamine Other (See Comments)     **Entex  HEADACHE (SEVERE)     Pseudoephedrine Tannate Other (See Comments)     **Entex  HEADACHE (SEVERE)     Levofloxacin Rash     **Levaquin     Moxifloxacin Hcl [Avelox] Rash       Review Of Systems:  Constitutional:    denies fever, weight changes, chills, and night sweats.  Eyes:    denies blurred or double vision-had eye Dr appointment-dry eye  Ears/Nose/Throat:   denies ear pain, nose problems, difficulty swallowing  Respiratory:  See hPI  Skin:   Rash to left axilla today  Cardiovascular:   denies chest pain, palpitations, edema  Gastrointestinal:   denies abdominal pain, bloating, nausea, early satiety; no change in bowel habits or blood in stool-is managing  Genitourinary:   denies difficulty with urination, blood in urine  Musculoskeletal:    denies new muscle pain, bone pain  Neurologic:   denies lightheadedness, headaches, numbness or tingling  Psychiatric:   denies anxiety, depression  Hematologic/Lymphatic/Immunologic:   denies easy bruising, easy bleeding, lumps or bumps noted  Endocrine:   Denies increased thirst    ECOG Performance Status: 1    Physical Exam:  /80   Pulse 93   Temp 98.5  F (36.9  C) (Tympanic)   Resp 18   Ht 1.6 m (5' 3\")   Wt 72 kg (158 lb 11.7 oz)   SpO2 98%   BMI 28.12 kg/m      GENERAL APPEARANCE: Healthy, alert and in no acute distress.  HEENT: Normocephalic, Sclerae anicteric. Oropharynx without ulcers, lesions, or thrush.  NECK:   No asymmetry or masses, no thyromegaly.  LYMPHATICS: No palpable cervical, supraclavicular, axillary, or inguinal nodes   RESP: Lungs clear to auscultation bilaterally, respirations regular and easy  CARDIOVASCULAR: Regular rate and rhythm. Normal S1, S2  ABDOMEN: Soft, nontender. Bowel sounds auscultated all 4 quadrants. No palpable organomegaly or masses.  MUSCULOSKELETAL: Extremities without gross deformities noted. No edema of bilateral lower " extremities.  SKIN: slight rash to left axilla  NEURO: Alert and oriented x 3.  Gait steady.  PSYCHIATRIC: Mentation and affect appear normal.  Mood appropriate.    Laboratory:    Results for WILLIAM WILDER (MRN [id number]) as of 2/20/2020 13:54   Ref. Range 2/18/2020 09:00   Sodium Latest Ref Range: 133 - 144 mmol/L 135   Potassium Latest Ref Range: 3.4 - 5.3 mmol/L 3.8   Chloride Latest Ref Range: 94 - 109 mmol/L 104   Carbon Dioxide Latest Ref Range: 20 - 32 mmol/L 27   Urea Nitrogen Latest Ref Range: 7 - 30 mg/dL 17   Creatinine Latest Ref Range: 0.52 - 1.04 mg/dL 0.68   GFR Estimate Latest Ref Range: >60 mL/min/1.73_m2 88   GFR Estimate If Black Latest Ref Range: >60 mL/min/1.73_m2 >90   Calcium Latest Ref Range: 8.5 - 10.1 mg/dL 8.3 (L)   Anion Gap Latest Ref Range: 3 - 14 mmol/L 4   Albumin Latest Ref Range: 3.4 - 5.0 g/dL 3.2 (L)   Protein Total Latest Ref Range: 6.8 - 8.8 g/dL 8.7   Bilirubin Total Latest Ref Range: 0.2 - 1.3 mg/dL 0.4   Alkaline Phosphatase Latest Ref Range: 40 - 150 U/L 117   ALT Latest Ref Range: 0 - 50 U/L 43   AST Latest Ref Range: 0 - 45 U/L 26     Results for WILLIAM WILDER (MRN [id number]) as of 2/20/2020 13:54   Ref. Range 2/18/2020 09:00   WBC Latest Ref Range: 4.0 - 11.0 10e9/L 5.6   Hemoglobin Latest Ref Range: 11.7 - 15.7 g/dL 11.1 (L)   Hematocrit Latest Ref Range: 35.0 - 47.0 % 32.7 (L)   Platelet Count Latest Ref Range: 150 - 450 10e9/L 206   RBC Count Latest Ref Range: 3.8 - 5.2 10e12/L 3.52 (L)   MCV Latest Ref Range: 78 - 100 fl 93   MCH Latest Ref Range: 26.5 - 33.0 pg 31.5   MCHC Latest Ref Range: 31.5 - 36.5 g/dL 33.9   RDW Latest Ref Range: 10.0 - 15.0 % 14.9   Diff Method Unknown Automated Method   % Neutrophils Latest Units: % 53.3   % Lymphocytes Latest Units: % 24.8   % Monocytes Latest Units: % 17.6   % Eosinophils Latest Units: % 1.6   % Basophils Latest Units: % 0.4   % Immature Granulocytes Latest Units: % 2.3   Nucleated RBCs Latest Ref Range: 0 /100 0    Absolute Neutrophil Latest Ref Range: 1.6 - 8.3 10e9/L 3.0   Absolute Lymphocytes Latest Ref Range: 0.8 - 5.3 10e9/L 1.4   Absolute Monocytes Latest Ref Range: 0.0 - 1.3 10e9/L 1.0   Absolute Eosinophils Latest Ref Range: 0.0 - 0.7 10e9/L 0.1   Absolute Basophils Latest Ref Range: 0.0 - 0.2 10e9/L 0.0   Abs Immature Granulocytes Latest Ref Range: 0 - 0.4 10e9/L 0.1   Absolute Nucleated RBC Unknown 0.0     Results for WILLIAM WILDER (MRN [id number]) as of 2/16/2020 22:14   Ref. Range 2/11/2020 12:55   Albumin Latest Ref Range: 3.4 - 5.0 g/dL 3.2 (L)   Protein Total Latest Ref Range: 6.8 - 8.8 g/dL 8.8   Bilirubin Total Latest Ref Range: 0.2 - 1.3 mg/dL 0.4   Alkaline Phosphatase Latest Ref Range: 40 - 150 U/L 113   ALT Latest Ref Range: 0 - 50 U/L 61 (H)   AST Latest Ref Range: 0 - 45 U/L 34   Bilirubin Direct Latest Ref Range: 0.0 - 0.2 mg/dL <0.1   WBC Latest Ref Range: 4.0 - 11.0 10e9/L 5.6   Hemoglobin Latest Ref Range: 11.7 - 15.7 g/dL 11.1 (L)   Hematocrit Latest Ref Range: 35.0 - 47.0 % 32.6 (L)   Platelet Count Latest Ref Range: 150 - 450 10e9/L 227   RBC Count Latest Ref Range: 3.8 - 5.2 10e12/L 3.52 (L)   MCV Latest Ref Range: 78 - 100 fl 93   MCH Latest Ref Range: 26.5 - 33.0 pg 31.5   MCHC Latest Ref Range: 31.5 - 36.5 g/dL 34.0   RDW Latest Ref Range: 10.0 - 15.0 % 14.8   Diff Method Unknown Automated Method   % Neutrophils Latest Units: % 55.3   % Lymphocytes Latest Units: % 25.1   % Monocytes Latest Units: % 15.8   % Eosinophils Latest Units: % 1.6   % Basophils Latest Units: % 0.4   % Immature Granulocytes Latest Units: % 1.8   Nucleated RBCs Latest Ref Range: 0 /100 0   Absolute Neutrophil Latest Ref Range: 1.6 - 8.3 10e9/L 3.1   Absolute Lymphocytes Latest Ref Range: 0.8 - 5.3 10e9/L 1.4   Absolute Monocytes Latest Ref Range: 0.0 - 1.3 10e9/L 0.9   Absolute Eosinophils Latest Ref Range: 0.0 - 0.7 10e9/L 0.1   Absolute Basophils Latest Ref Range: 0.0 - 0.2 10e9/L 0.0   Abs Immature Granulocytes  Latest Ref Range: 0 - 0.4 10e9/L 0.1   Absolute Nucleated RBC Unknown 0.0   Albumin Fraction Latest Ref Range: 3.7 - 5.1 g/dL 4.0   Alpha 1 Fraction Latest Ref Range: 0.2 - 0.4 g/dL 0.3   Alpha 2 Fraction Latest Ref Range: 0.5 - 0.9 g/dL 0.7   Beta Fraction Latest Ref Range: 0.6 - 1.0 g/dL 0.6   ELP Interpretation: Unknown Monoclonal protei...   Gamma Fraction Latest Ref Range: 0.7 - 1.6 g/dL 2.8 (H)   IGA Latest Ref Range: 84 - 499 mg/dL 10 (L)   IGG Latest Ref Range: 610 - 1,616 mg/dL 3,117 (H)   IGM Latest Ref Range: 35 - 242 mg/dL 12 (L)   Immunofixation ELP Unknown (Note)   Kappa Free Lt Chain Latest Ref Range: 0.33 - 1.94 mg/dL 0.67   Kappa Lambda Ratio Latest Ref Range: 0.26 - 1.65  3.05 (H)   Lambda Free Lt Chain Latest Ref Range: 0.57 - 2.63 mg/dL 0.22 (L)   Monoclonal Peak Latest Ref Range: 0.0 g/dL 2.6 (H)     Imaging Studies:  None completed for today's visit      ASSESSMENT/PLAN:    #1 Multiple myeloma:   IgG kappa multiple myeloma with 80% involvement of the bone marrow diagnosed June 2019 initially treated with Velcade and Decadron and received 4 cycles with increasing M-spike and kappa/lambda ratio.  Treatment changed to CyBorD on 10/1/2019 with elevating LFT's felt to be related tot he Cytoxan so this was decreased with cycle 3 day 8.  M-spike down a bit more with slight elevation of her ratio.  Will continue the same and she will follow-up prior to cycle 7-day 1 with myeloma labs.     #2 myeloma lytic lesion: Due for Zometa infusion today and again in April.     #3 elevated liver functions:  Showing some slight fluctuation and within normal limits today.  We will continue to monitor while on the oral Cytoxan.    #4 Palliative chemotherapy:  We dicussed palliative intent of her treatment and discussed the Palliative Care Program offered here through [facility].  Information sheets given and she will call and talk with them.    I encouraged patient to call with any questions or concerns.       Barbie  CHRISTIANO Quintana, NP  APRN, FNP-BC, AOCNP

## 2020-02-18 ENCOUNTER — INFUSION THERAPY VISIT (OUTPATIENT)
Dept: INFUSION THERAPY | Facility: OTHER | Age: 70
End: 2020-02-18
Attending: INTERNAL MEDICINE
Payer: MEDICARE

## 2020-02-18 ENCOUNTER — ONCOLOGY VISIT (OUTPATIENT)
Dept: ONCOLOGY | Facility: OTHER | Age: 70
End: 2020-02-18
Attending: NURSE PRACTITIONER
Payer: MEDICARE

## 2020-02-18 ENCOUNTER — APPOINTMENT (OUTPATIENT)
Dept: LAB | Facility: OTHER | Age: 70
End: 2020-02-18
Attending: INTERNAL MEDICINE
Payer: MEDICARE

## 2020-02-18 VITALS
DIASTOLIC BLOOD PRESSURE: 75 MMHG | RESPIRATION RATE: 16 BRPM | HEART RATE: 68 BPM | BODY MASS INDEX: 28.12 KG/M2 | SYSTOLIC BLOOD PRESSURE: 133 MMHG | OXYGEN SATURATION: 98 % | TEMPERATURE: 98.4 F | WEIGHT: 158.73 LBS | HEIGHT: 63 IN

## 2020-02-18 VITALS
DIASTOLIC BLOOD PRESSURE: 80 MMHG | BODY MASS INDEX: 28.12 KG/M2 | HEART RATE: 93 BPM | OXYGEN SATURATION: 98 % | WEIGHT: 158.73 LBS | TEMPERATURE: 98.5 F | SYSTOLIC BLOOD PRESSURE: 120 MMHG | HEIGHT: 63 IN | RESPIRATION RATE: 18 BRPM

## 2020-02-18 DIAGNOSIS — M89.9 BONE LESION: ICD-10-CM

## 2020-02-18 DIAGNOSIS — R79.89 ELEVATED LFTS: ICD-10-CM

## 2020-02-18 DIAGNOSIS — Z79.899 PALLIATIVE CHEMOTHERAPY UNDERWAY: ICD-10-CM

## 2020-02-18 DIAGNOSIS — Z53.9 ERRONEOUS ENCOUNTER--DISREGARD: Primary | ICD-10-CM

## 2020-02-18 DIAGNOSIS — C90.00 MULTIPLE MYELOMA NOT HAVING ACHIEVED REMISSION (H): Primary | ICD-10-CM

## 2020-02-18 DIAGNOSIS — C90.00 MULTIPLE MYELOMA NOT HAVING ACHIEVED REMISSION (H): ICD-10-CM

## 2020-02-18 LAB
ALBUMIN SERPL-MCNC: 3.2 G/DL (ref 3.4–5)
ALP SERPL-CCNC: 117 U/L (ref 40–150)
ALT SERPL W P-5'-P-CCNC: 43 U/L (ref 0–50)
ANION GAP SERPL CALCULATED.3IONS-SCNC: 4 MMOL/L (ref 3–14)
AST SERPL W P-5'-P-CCNC: 26 U/L (ref 0–45)
BASOPHILS # BLD AUTO: 0 10E9/L (ref 0–0.2)
BASOPHILS NFR BLD AUTO: 0.4 %
BILIRUB SERPL-MCNC: 0.4 MG/DL (ref 0.2–1.3)
BUN SERPL-MCNC: 17 MG/DL (ref 7–30)
CALCIUM SERPL-MCNC: 8.3 MG/DL (ref 8.5–10.1)
CHLORIDE SERPL-SCNC: 104 MMOL/L (ref 94–109)
CO2 SERPL-SCNC: 27 MMOL/L (ref 20–32)
CREAT SERPL-MCNC: 0.68 MG/DL (ref 0.52–1.04)
DIFFERENTIAL METHOD BLD: ABNORMAL
EOSINOPHIL # BLD AUTO: 0.1 10E9/L (ref 0–0.7)
EOSINOPHIL NFR BLD AUTO: 1.6 %
ERYTHROCYTE [DISTWIDTH] IN BLOOD BY AUTOMATED COUNT: 14.9 % (ref 10–15)
GFR SERPL CREATININE-BSD FRML MDRD: 88 ML/MIN/{1.73_M2}
GLUCOSE SERPL-MCNC: 77 MG/DL (ref 70–99)
HCT VFR BLD AUTO: 32.7 % (ref 35–47)
HGB BLD-MCNC: 11.1 G/DL (ref 11.7–15.7)
IMM GRANULOCYTES # BLD: 0.1 10E9/L (ref 0–0.4)
IMM GRANULOCYTES NFR BLD: 2.3 %
LYMPHOCYTES # BLD AUTO: 1.4 10E9/L (ref 0.8–5.3)
LYMPHOCYTES NFR BLD AUTO: 24.8 %
MCH RBC QN AUTO: 31.5 PG (ref 26.5–33)
MCHC RBC AUTO-ENTMCNC: 33.9 G/DL (ref 31.5–36.5)
MCV RBC AUTO: 93 FL (ref 78–100)
MONOCYTES # BLD AUTO: 1 10E9/L (ref 0–1.3)
MONOCYTES NFR BLD AUTO: 17.6 %
NEUTROPHILS # BLD AUTO: 3 10E9/L (ref 1.6–8.3)
NEUTROPHILS NFR BLD AUTO: 53.3 %
NRBC # BLD AUTO: 0 10*3/UL
NRBC BLD AUTO-RTO: 0 /100
PLATELET # BLD AUTO: 206 10E9/L (ref 150–450)
POTASSIUM SERPL-SCNC: 3.8 MMOL/L (ref 3.4–5.3)
PROT SERPL-MCNC: 8.7 G/DL (ref 6.8–8.8)
RBC # BLD AUTO: 3.52 10E12/L (ref 3.8–5.2)
SODIUM SERPL-SCNC: 135 MMOL/L (ref 133–144)
WBC # BLD AUTO: 5.6 10E9/L (ref 4–11)

## 2020-02-18 PROCEDURE — 80053 COMPREHEN METABOLIC PANEL: CPT | Mod: ZL | Performed by: INTERNAL MEDICINE

## 2020-02-18 PROCEDURE — G0463 HOSPITAL OUTPT CLINIC VISIT: HCPCS | Mod: 25

## 2020-02-18 PROCEDURE — 36415 COLL VENOUS BLD VENIPUNCTURE: CPT | Mod: ZL | Performed by: INTERNAL MEDICINE

## 2020-02-18 PROCEDURE — G0463 HOSPITAL OUTPT CLINIC VISIT: HCPCS

## 2020-02-18 PROCEDURE — 25000128 H RX IP 250 OP 636: Performed by: INTERNAL MEDICINE

## 2020-02-18 PROCEDURE — 25000128 H RX IP 250 OP 636: Performed by: NURSE PRACTITIONER

## 2020-02-18 PROCEDURE — 96401 CHEMO ANTI-NEOPL SQ/IM: CPT

## 2020-02-18 PROCEDURE — 99214 OFFICE O/P EST MOD 30 MIN: CPT | Performed by: NURSE PRACTITIONER

## 2020-02-18 PROCEDURE — 85025 COMPLETE CBC W/AUTO DIFF WBC: CPT | Mod: ZL | Performed by: INTERNAL MEDICINE

## 2020-02-18 RX ORDER — LORAZEPAM 2 MG/ML
0.5 INJECTION INTRAMUSCULAR EVERY 4 HOURS PRN
Status: CANCELLED
Start: 2020-02-18

## 2020-02-18 RX ORDER — EPINEPHRINE 0.3 MG/.3ML
0.3 INJECTION SUBCUTANEOUS EVERY 5 MIN PRN
Status: CANCELLED | OUTPATIENT
Start: 2020-03-10

## 2020-02-18 RX ORDER — NALOXONE HYDROCHLORIDE 0.4 MG/ML
.1-.4 INJECTION, SOLUTION INTRAMUSCULAR; INTRAVENOUS; SUBCUTANEOUS
Status: CANCELLED | OUTPATIENT
Start: 2020-03-10

## 2020-02-18 RX ORDER — DIPHENHYDRAMINE HYDROCHLORIDE 50 MG/ML
50 INJECTION INTRAMUSCULAR; INTRAVENOUS
Status: CANCELLED
Start: 2020-03-03

## 2020-02-18 RX ORDER — NALOXONE HYDROCHLORIDE 0.4 MG/ML
.1-.4 INJECTION, SOLUTION INTRAMUSCULAR; INTRAVENOUS; SUBCUTANEOUS
Status: CANCELLED | OUTPATIENT
Start: 2020-03-03

## 2020-02-18 RX ORDER — DIPHENHYDRAMINE HYDROCHLORIDE 50 MG/ML
50 INJECTION INTRAMUSCULAR; INTRAVENOUS
Status: CANCELLED
Start: 2020-02-25

## 2020-02-18 RX ORDER — MEPERIDINE HYDROCHLORIDE 25 MG/ML
25 INJECTION INTRAMUSCULAR; INTRAVENOUS; SUBCUTANEOUS EVERY 30 MIN PRN
Status: CANCELLED | OUTPATIENT
Start: 2020-03-03

## 2020-02-18 RX ORDER — ALBUTEROL SULFATE 0.83 MG/ML
2.5 SOLUTION RESPIRATORY (INHALATION)
Status: CANCELLED | OUTPATIENT
Start: 2020-03-10

## 2020-02-18 RX ORDER — CYCLOPHOSPHAMIDE 50 MG/1
150 CAPSULE ORAL DAILY
Qty: 12 CAPSULE | Refills: 11 | Status: SHIPPED | OUTPATIENT
Start: 2020-02-18 | End: 2020-11-24

## 2020-02-18 RX ORDER — ONDANSETRON 4 MG/1
8 TABLET, FILM COATED ORAL ONCE
Status: CANCELLED
Start: 2020-02-25

## 2020-02-18 RX ORDER — EPINEPHRINE 1 MG/ML
0.3 INJECTION, SOLUTION, CONCENTRATE INTRAVENOUS EVERY 5 MIN PRN
Status: CANCELLED | OUTPATIENT
Start: 2020-03-03

## 2020-02-18 RX ORDER — LORAZEPAM 2 MG/ML
0.5 INJECTION INTRAMUSCULAR EVERY 4 HOURS PRN
Status: CANCELLED
Start: 2020-02-25

## 2020-02-18 RX ORDER — LORAZEPAM 2 MG/ML
0.5 INJECTION INTRAMUSCULAR EVERY 4 HOURS PRN
Status: CANCELLED
Start: 2020-03-03

## 2020-02-18 RX ORDER — EPINEPHRINE 0.3 MG/.3ML
0.3 INJECTION SUBCUTANEOUS EVERY 5 MIN PRN
Status: CANCELLED | OUTPATIENT
Start: 2020-02-18

## 2020-02-18 RX ORDER — ALBUTEROL SULFATE 90 UG/1
1-2 AEROSOL, METERED RESPIRATORY (INHALATION)
Status: CANCELLED
Start: 2020-03-10

## 2020-02-18 RX ORDER — LORAZEPAM 2 MG/ML
0.5 INJECTION INTRAMUSCULAR EVERY 4 HOURS PRN
Status: CANCELLED
Start: 2020-03-10

## 2020-02-18 RX ORDER — SODIUM CHLORIDE 9 MG/ML
1000 INJECTION, SOLUTION INTRAVENOUS CONTINUOUS PRN
Status: CANCELLED
Start: 2020-03-10

## 2020-02-18 RX ORDER — METHYLPREDNISOLONE SODIUM SUCCINATE 125 MG/2ML
125 INJECTION, POWDER, LYOPHILIZED, FOR SOLUTION INTRAMUSCULAR; INTRAVENOUS
Status: CANCELLED
Start: 2020-02-25

## 2020-02-18 RX ORDER — ALBUTEROL SULFATE 0.83 MG/ML
2.5 SOLUTION RESPIRATORY (INHALATION)
Status: CANCELLED | OUTPATIENT
Start: 2020-02-25

## 2020-02-18 RX ORDER — ALBUTEROL SULFATE 90 UG/1
1-2 AEROSOL, METERED RESPIRATORY (INHALATION)
Status: CANCELLED
Start: 2020-02-18

## 2020-02-18 RX ORDER — MEPERIDINE HYDROCHLORIDE 25 MG/ML
25 INJECTION INTRAMUSCULAR; INTRAVENOUS; SUBCUTANEOUS EVERY 30 MIN PRN
Status: CANCELLED | OUTPATIENT
Start: 2020-02-25

## 2020-02-18 RX ORDER — NALOXONE HYDROCHLORIDE 0.4 MG/ML
.1-.4 INJECTION, SOLUTION INTRAMUSCULAR; INTRAVENOUS; SUBCUTANEOUS
Status: CANCELLED | OUTPATIENT
Start: 2020-02-25

## 2020-02-18 RX ORDER — DIPHENHYDRAMINE HYDROCHLORIDE 50 MG/ML
50 INJECTION INTRAMUSCULAR; INTRAVENOUS
Status: CANCELLED
Start: 2020-03-10

## 2020-02-18 RX ORDER — ALBUTEROL SULFATE 90 UG/1
1-2 AEROSOL, METERED RESPIRATORY (INHALATION)
Status: CANCELLED
Start: 2020-03-03

## 2020-02-18 RX ORDER — SODIUM CHLORIDE 9 MG/ML
1000 INJECTION, SOLUTION INTRAVENOUS CONTINUOUS PRN
Status: CANCELLED
Start: 2020-02-18

## 2020-02-18 RX ORDER — MEPERIDINE HYDROCHLORIDE 25 MG/ML
25 INJECTION INTRAMUSCULAR; INTRAVENOUS; SUBCUTANEOUS EVERY 30 MIN PRN
Status: CANCELLED | OUTPATIENT
Start: 2020-03-10

## 2020-02-18 RX ORDER — ONDANSETRON 4 MG/1
8 TABLET, FILM COATED ORAL ONCE
Status: CANCELLED
Start: 2020-03-03

## 2020-02-18 RX ORDER — EPINEPHRINE 0.3 MG/.3ML
0.3 INJECTION SUBCUTANEOUS EVERY 5 MIN PRN
Status: CANCELLED | OUTPATIENT
Start: 2020-02-25

## 2020-02-18 RX ORDER — EPINEPHRINE 1 MG/ML
0.3 INJECTION, SOLUTION, CONCENTRATE INTRAVENOUS EVERY 5 MIN PRN
Status: CANCELLED | OUTPATIENT
Start: 2020-03-10

## 2020-02-18 RX ORDER — SODIUM CHLORIDE 9 MG/ML
1000 INJECTION, SOLUTION INTRAVENOUS CONTINUOUS PRN
Status: CANCELLED
Start: 2020-02-25

## 2020-02-18 RX ORDER — CYCLOPHOSPHAMIDE 50 MG/1
150 CAPSULE ORAL DAILY
Qty: 12 CAPSULE | Refills: 11 | Status: CANCELLED | OUTPATIENT
Start: 2020-02-18

## 2020-02-18 RX ORDER — EPINEPHRINE 1 MG/ML
0.3 INJECTION, SOLUTION, CONCENTRATE INTRAVENOUS EVERY 5 MIN PRN
Status: CANCELLED | OUTPATIENT
Start: 2020-02-18

## 2020-02-18 RX ORDER — EPINEPHRINE 1 MG/ML
0.3 INJECTION, SOLUTION, CONCENTRATE INTRAVENOUS EVERY 5 MIN PRN
Status: CANCELLED | OUTPATIENT
Start: 2020-02-25

## 2020-02-18 RX ORDER — METHYLPREDNISOLONE SODIUM SUCCINATE 125 MG/2ML
125 INJECTION, POWDER, LYOPHILIZED, FOR SOLUTION INTRAMUSCULAR; INTRAVENOUS
Status: CANCELLED
Start: 2020-02-18

## 2020-02-18 RX ORDER — METHYLPREDNISOLONE SODIUM SUCCINATE 125 MG/2ML
125 INJECTION, POWDER, LYOPHILIZED, FOR SOLUTION INTRAMUSCULAR; INTRAVENOUS
Status: CANCELLED
Start: 2020-03-03

## 2020-02-18 RX ORDER — ALBUTEROL SULFATE 90 UG/1
1-2 AEROSOL, METERED RESPIRATORY (INHALATION)
Status: CANCELLED
Start: 2020-02-25

## 2020-02-18 RX ORDER — NALOXONE HYDROCHLORIDE 0.4 MG/ML
.1-.4 INJECTION, SOLUTION INTRAMUSCULAR; INTRAVENOUS; SUBCUTANEOUS
Status: CANCELLED | OUTPATIENT
Start: 2020-02-18

## 2020-02-18 RX ORDER — ALBUTEROL SULFATE 0.83 MG/ML
2.5 SOLUTION RESPIRATORY (INHALATION)
Status: CANCELLED | OUTPATIENT
Start: 2020-02-18

## 2020-02-18 RX ORDER — ALBUTEROL SULFATE 0.83 MG/ML
2.5 SOLUTION RESPIRATORY (INHALATION)
Status: CANCELLED | OUTPATIENT
Start: 2020-03-03

## 2020-02-18 RX ORDER — MEPERIDINE HYDROCHLORIDE 25 MG/ML
25 INJECTION INTRAMUSCULAR; INTRAVENOUS; SUBCUTANEOUS EVERY 30 MIN PRN
Status: CANCELLED | OUTPATIENT
Start: 2020-02-18

## 2020-02-18 RX ORDER — EPINEPHRINE 0.3 MG/.3ML
0.3 INJECTION SUBCUTANEOUS EVERY 5 MIN PRN
Status: CANCELLED | OUTPATIENT
Start: 2020-03-03

## 2020-02-18 RX ORDER — DIPHENHYDRAMINE HYDROCHLORIDE 50 MG/ML
50 INJECTION INTRAMUSCULAR; INTRAVENOUS
Status: CANCELLED
Start: 2020-02-18

## 2020-02-18 RX ORDER — ONDANSETRON 4 MG/1
8 TABLET, FILM COATED ORAL ONCE
Status: COMPLETED | OUTPATIENT
Start: 2020-02-18 | End: 2020-02-18

## 2020-02-18 RX ORDER — ONDANSETRON 4 MG/1
8 TABLET, FILM COATED ORAL ONCE
Status: CANCELLED
Start: 2020-03-10

## 2020-02-18 RX ORDER — METHYLPREDNISOLONE SODIUM SUCCINATE 125 MG/2ML
125 INJECTION, POWDER, LYOPHILIZED, FOR SOLUTION INTRAMUSCULAR; INTRAVENOUS
Status: CANCELLED
Start: 2020-03-10

## 2020-02-18 RX ORDER — SODIUM CHLORIDE 9 MG/ML
1000 INJECTION, SOLUTION INTRAVENOUS CONTINUOUS PRN
Status: CANCELLED
Start: 2020-03-03

## 2020-02-18 RX ADMIN — BORTEZOMIB 2.6 MG: 3.5 INJECTION, POWDER, LYOPHILIZED, FOR SOLUTION INTRAVENOUS; SUBCUTANEOUS at 12:42

## 2020-02-18 RX ADMIN — ONDANSETRON HYDROCHLORIDE 8 MG: 4 TABLET, FILM COATED ORAL at 11:59

## 2020-02-18 ASSESSMENT — PAIN SCALES - GENERAL: PAINLEVEL: NO PAIN (0)

## 2020-02-18 ASSESSMENT — MIFFLIN-ST. JEOR
SCORE: 1204.13
SCORE: 1204

## 2020-02-18 NOTE — PATIENT INSTRUCTIONS
We would like to see you back per your calender.     I will send a prescription for your cytoxan.    When you are in need of a refill, please call your pharmacy and they will send us a request.     If you have any questions please call [phone]    Other instructions:  Please consider enrolling in the Palliative care Program.  See sheets.

## 2020-02-18 NOTE — NURSING NOTE
"Chief Complaint   Patient presents with     RECHECK     Follow up Multiple myeloma        Initial /80   Pulse 93   Temp 98.5  F (36.9  C) (Tympanic)   Resp 18   Ht 1.6 m (5' 3\")   Wt 72 kg (158 lb 11.7 oz)   SpO2 98%   BMI 28.12 kg/m   Estimated body mass index is 28.12 kg/m  as calculated from the following:    Height as of this encounter: 1.6 m (5' 3\").    Weight as of this encounter: 72 kg (158 lb 11.7 oz).  Medication Reconciliation: complete.  Immunizations and advance directives status reviewed. Pain scale =0 , PHQ-2=0.            Vilma Chi LPN    "

## 2020-02-18 NOTE — PROGRESS NOTES
Patient is a 69 year old female her today for injection of Velcade per order of . Patient meets parameters for today's infusion. Patient identified with two identifiers, order verified, and verbal consent for today's infusion obtained from patient.     Today's lab values: WBC: 5.6, HGB: 11.1, PLT: 206, ANC: 3.0. Patient meets order parameters for today's treatment.  Patient denies questions or concerns regarding injection and/or medication(s) being administered.    1242 Velcade injected SQ into right upper outer arm per protocol rotating sites.     Injection administered per protocol. Patient tolerated infusion without incident, no signs or symptoms of adverse reaction noted. Patient denies pain or discomfort.     Covered with a sterile bandage. Pt instructed to leave bandage intact for a minimum of one hour, and to call with questions or concerns. Patient declines copy of appointments, discharge instructions, and after visit summary (AVS). Patient states understanding, discharged ambulatory. Irene Booth RN

## 2020-02-18 NOTE — PATIENT INSTRUCTIONS
We will see you back as planned. If you have any questions or concerns, we can be reached Monday through Friday 8am - 430pm at [phone] (Care Coordinator), if unable to reach them, you can try [phone] ([facility]), [phone] (LPN with Dr Walker), or [phone] (Infusion). If you have concerns related to a potential reaction/side effect after hours/weekends/holiday's, please seek emergent medical care.

## 2020-02-25 ENCOUNTER — APPOINTMENT (OUTPATIENT)
Dept: LAB | Facility: OTHER | Age: 70
End: 2020-02-25
Attending: INTERNAL MEDICINE
Payer: MEDICARE

## 2020-02-25 ENCOUNTER — INFUSION THERAPY VISIT (OUTPATIENT)
Dept: INFUSION THERAPY | Facility: OTHER | Age: 70
End: 2020-02-25
Attending: INTERNAL MEDICINE
Payer: MEDICARE

## 2020-02-25 VITALS
OXYGEN SATURATION: 97 % | DIASTOLIC BLOOD PRESSURE: 74 MMHG | HEART RATE: 80 BPM | HEIGHT: 63 IN | WEIGHT: 158.29 LBS | RESPIRATION RATE: 16 BRPM | BODY MASS INDEX: 28.05 KG/M2 | SYSTOLIC BLOOD PRESSURE: 109 MMHG | TEMPERATURE: 98.3 F

## 2020-02-25 DIAGNOSIS — C90.00 MULTIPLE MYELOMA NOT HAVING ACHIEVED REMISSION (H): Primary | ICD-10-CM

## 2020-02-25 LAB
ALBUMIN SERPL-MCNC: 3.3 G/DL (ref 3.4–5)
ALP SERPL-CCNC: 114 U/L (ref 40–150)
ALT SERPL W P-5'-P-CCNC: 41 U/L (ref 0–50)
AST SERPL W P-5'-P-CCNC: 24 U/L (ref 0–45)
BASOPHILS # BLD AUTO: 0 10E9/L (ref 0–0.2)
BASOPHILS NFR BLD AUTO: 0.4 %
BILIRUB DIRECT SERPL-MCNC: 0.1 MG/DL (ref 0–0.2)
BILIRUB SERPL-MCNC: 0.4 MG/DL (ref 0.2–1.3)
DIFFERENTIAL METHOD BLD: ABNORMAL
EOSINOPHIL # BLD AUTO: 0.1 10E9/L (ref 0–0.7)
EOSINOPHIL NFR BLD AUTO: 1.6 %
ERYTHROCYTE [DISTWIDTH] IN BLOOD BY AUTOMATED COUNT: 15 % (ref 10–15)
HCT VFR BLD AUTO: 31.9 % (ref 35–47)
HGB BLD-MCNC: 10.9 G/DL (ref 11.7–15.7)
IMM GRANULOCYTES # BLD: 0.1 10E9/L (ref 0–0.4)
IMM GRANULOCYTES NFR BLD: 1.2 %
LYMPHOCYTES # BLD AUTO: 1.2 10E9/L (ref 0.8–5.3)
LYMPHOCYTES NFR BLD AUTO: 24.2 %
MCH RBC QN AUTO: 31.7 PG (ref 26.5–33)
MCHC RBC AUTO-ENTMCNC: 34.2 G/DL (ref 31.5–36.5)
MCV RBC AUTO: 93 FL (ref 78–100)
MONOCYTES # BLD AUTO: 0.9 10E9/L (ref 0–1.3)
MONOCYTES NFR BLD AUTO: 17.1 %
NEUTROPHILS # BLD AUTO: 2.8 10E9/L (ref 1.6–8.3)
NEUTROPHILS NFR BLD AUTO: 55.5 %
NRBC # BLD AUTO: 0 10*3/UL
NRBC BLD AUTO-RTO: 0 /100
PLATELET # BLD AUTO: 194 10E9/L (ref 150–450)
PROT SERPL-MCNC: 8.7 G/DL (ref 6.8–8.8)
RBC # BLD AUTO: 3.44 10E12/L (ref 3.8–5.2)
WBC # BLD AUTO: 5.1 10E9/L (ref 4–11)

## 2020-02-25 PROCEDURE — 80076 HEPATIC FUNCTION PANEL: CPT | Mod: ZL | Performed by: NURSE PRACTITIONER

## 2020-02-25 PROCEDURE — 25000128 H RX IP 250 OP 636: Mod: JW | Performed by: NURSE PRACTITIONER

## 2020-02-25 PROCEDURE — 85025 COMPLETE CBC W/AUTO DIFF WBC: CPT | Mod: ZL | Performed by: NURSE PRACTITIONER

## 2020-02-25 PROCEDURE — 96401 CHEMO ANTI-NEOPL SQ/IM: CPT

## 2020-02-25 PROCEDURE — 36415 COLL VENOUS BLD VENIPUNCTURE: CPT | Mod: ZL | Performed by: NURSE PRACTITIONER

## 2020-02-25 RX ORDER — ONDANSETRON 4 MG/1
8 TABLET, FILM COATED ORAL ONCE
Status: COMPLETED | OUTPATIENT
Start: 2020-02-25 | End: 2020-02-25

## 2020-02-25 RX ADMIN — ONDANSETRON HYDROCHLORIDE 8 MG: 4 TABLET, FILM COATED ORAL at 13:49

## 2020-02-25 RX ADMIN — BORTEZOMIB 2.6 MG: 3.5 INJECTION, POWDER, LYOPHILIZED, FOR SOLUTION INTRAVENOUS; SUBCUTANEOUS at 14:51

## 2020-02-25 ASSESSMENT — MIFFLIN-ST. JEOR: SCORE: 1202

## 2020-02-25 ASSESSMENT — PAIN SCALES - GENERAL: PAINLEVEL: NO PAIN (0)

## 2020-02-25 NOTE — PROGRESS NOTES
Patient is a 69 year old female here today for injection of Velcade per order of . Patient meets parameters for today's infusion. Patient identified with two identifiers, order verified, and verbal consent for today's infusion obtained from patient. Written consent for treatment is on file and valid.    Today's lab values: WBC: 5.1, HGB: 10.9, PLT: 194  ANC: 2.8. Patient meets order parameters for today's treatment.    Patient denies questions or concerns regarding injection and/or medication(s) being administered.    Independent dose check completed with Julia Anderson RN.    Velcade injected SQ into left upper outer arm at  45 degree angle  per protocol rotating sites.     Injection administered per protocol. Patient tolerated infusion without incident, no signs or symptoms of adverse reaction noted. Patient denies pain or discomfort.     Covered with a sterile bandage. Pt instructed to leave bandage intact for a minimum of one hour, and to call with questions or concerns. Patient states understanding, discharged ambulatory.

## 2020-02-28 ENCOUNTER — PATIENT OUTREACH (OUTPATIENT)
Dept: CARE COORDINATION | Facility: OTHER | Age: 70
End: 2020-02-28

## 2020-02-28 DIAGNOSIS — C90.00 MULTIPLE MYELOMA NOT HAVING ACHIEVED REMISSION (H): Primary | ICD-10-CM

## 2020-02-28 NOTE — PROGRESS NOTES
Clinic Care Coordination Contact  Care Team Conversations    Patient in agreement for palliative care referral.

## 2020-03-03 ENCOUNTER — APPOINTMENT (OUTPATIENT)
Dept: LAB | Facility: OTHER | Age: 70
End: 2020-03-03
Attending: INTERNAL MEDICINE
Payer: MEDICARE

## 2020-03-03 ENCOUNTER — INFUSION THERAPY VISIT (OUTPATIENT)
Dept: INFUSION THERAPY | Facility: OTHER | Age: 70
End: 2020-03-03
Attending: INTERNAL MEDICINE
Payer: MEDICARE

## 2020-03-03 VITALS
HEIGHT: 63 IN | RESPIRATION RATE: 16 BRPM | DIASTOLIC BLOOD PRESSURE: 66 MMHG | WEIGHT: 156.97 LBS | BODY MASS INDEX: 27.81 KG/M2 | SYSTOLIC BLOOD PRESSURE: 104 MMHG | TEMPERATURE: 98.8 F | OXYGEN SATURATION: 97 % | HEART RATE: 88 BPM

## 2020-03-03 DIAGNOSIS — C90.00 MULTIPLE MYELOMA NOT HAVING ACHIEVED REMISSION (H): Primary | ICD-10-CM

## 2020-03-03 LAB
ALBUMIN SERPL-MCNC: 3.3 G/DL (ref 3.4–5)
ALP SERPL-CCNC: 108 U/L (ref 40–150)
ALT SERPL W P-5'-P-CCNC: 43 U/L (ref 0–50)
AST SERPL W P-5'-P-CCNC: 27 U/L (ref 0–45)
BASOPHILS # BLD AUTO: 0 10E9/L (ref 0–0.2)
BASOPHILS NFR BLD AUTO: 0.2 %
BILIRUB DIRECT SERPL-MCNC: 0.1 MG/DL (ref 0–0.2)
BILIRUB SERPL-MCNC: 0.3 MG/DL (ref 0.2–1.3)
DIFFERENTIAL METHOD BLD: ABNORMAL
EOSINOPHIL # BLD AUTO: 0.1 10E9/L (ref 0–0.7)
EOSINOPHIL NFR BLD AUTO: 2.1 %
ERYTHROCYTE [DISTWIDTH] IN BLOOD BY AUTOMATED COUNT: 15 % (ref 10–15)
HCT VFR BLD AUTO: 33.4 % (ref 35–47)
HGB BLD-MCNC: 11.2 G/DL (ref 11.7–15.7)
IMM GRANULOCYTES # BLD: 0.1 10E9/L (ref 0–0.4)
IMM GRANULOCYTES NFR BLD: 1.5 %
LYMPHOCYTES # BLD AUTO: 1.3 10E9/L (ref 0.8–5.3)
LYMPHOCYTES NFR BLD AUTO: 26.6 %
MCH RBC QN AUTO: 31.5 PG (ref 26.5–33)
MCHC RBC AUTO-ENTMCNC: 33.5 G/DL (ref 31.5–36.5)
MCV RBC AUTO: 94 FL (ref 78–100)
MONOCYTES # BLD AUTO: 0.8 10E9/L (ref 0–1.3)
MONOCYTES NFR BLD AUTO: 16.9 %
NEUTROPHILS # BLD AUTO: 2.5 10E9/L (ref 1.6–8.3)
NEUTROPHILS NFR BLD AUTO: 52.7 %
NRBC # BLD AUTO: 0 10*3/UL
NRBC BLD AUTO-RTO: 0 /100
PLATELET # BLD AUTO: 222 10E9/L (ref 150–450)
PROT SERPL-MCNC: 8.8 G/DL (ref 6.8–8.8)
RBC # BLD AUTO: 3.56 10E12/L (ref 3.8–5.2)
WBC # BLD AUTO: 4.7 10E9/L (ref 4–11)

## 2020-03-03 PROCEDURE — 80076 HEPATIC FUNCTION PANEL: CPT | Mod: ZL | Performed by: NURSE PRACTITIONER

## 2020-03-03 PROCEDURE — 96401 CHEMO ANTI-NEOPL SQ/IM: CPT

## 2020-03-03 PROCEDURE — 36415 COLL VENOUS BLD VENIPUNCTURE: CPT | Mod: ZL | Performed by: NURSE PRACTITIONER

## 2020-03-03 PROCEDURE — 25000128 H RX IP 250 OP 636: Mod: JW | Performed by: NURSE PRACTITIONER

## 2020-03-03 PROCEDURE — 85025 COMPLETE CBC W/AUTO DIFF WBC: CPT | Mod: ZL | Performed by: NURSE PRACTITIONER

## 2020-03-03 RX ORDER — ONDANSETRON 4 MG/1
8 TABLET, FILM COATED ORAL ONCE
Status: COMPLETED | OUTPATIENT
Start: 2020-03-03 | End: 2020-03-03

## 2020-03-03 RX ADMIN — ONDANSETRON HYDROCHLORIDE 8 MG: 4 TABLET, FILM COATED ORAL at 13:09

## 2020-03-03 RX ADMIN — BORTEZOMIB 2.6 MG: 3.5 INJECTION, POWDER, LYOPHILIZED, FOR SOLUTION INTRAVENOUS; SUBCUTANEOUS at 13:41

## 2020-03-03 ASSESSMENT — PAIN SCALES - GENERAL: PAINLEVEL: NO PAIN (0)

## 2020-03-03 ASSESSMENT — MIFFLIN-ST. JEOR: SCORE: 1195.97

## 2020-03-03 NOTE — PROGRESS NOTES
Patient is a 69 year old female here today for injection of Velcade per order of . Patient meets parameters for today's infusion. Patient identified with two identifiers, order verified, and verbal consent for today's infusion obtained from patient. Written consent for treatment is on file and valid.    Today's lab values: WBC: 4.7, HGB: 11.2, PLT: 222  ANC: 2.5. Patient meets order parameters for today's treatment.    Patient denies questions or concerns regarding injection and/or medication(s) being administered.    Independent dose check completed with Julia Anderson RN.    Velcade injected SQ into right upper outer arm at  45 degree angle  per protocol rotating sites.     Injection administered per protocol. Patient tolerated infusion without incident, no signs or symptoms of adverse reaction noted. Patient denies pain or discomfort.     Covered with a sterile bandage. Pt instructed to leave bandage intact for a minimum of one hour, and to call with questions or concerns. Patient states understanding, discharged.

## 2020-03-04 NOTE — LETTER
January 3, 2019      Carmelita Watts  [address] [facility] [address]        Dear ,    We are writing to inform you of your test results.    Your test results fall within the expected range(s) or remain unchanged from previous results.  Please continue with current treatment plan.    Resulted Orders   Lipid Profile   Result Value Ref Range    Cholesterol 153 <200 mg/dL    Triglycerides 126 <150 mg/dL    HDL Cholesterol 42 (L) >49 mg/dL    LDL Cholesterol Calculated 86 <100 mg/dL      Comment:      Desirable:       <100 mg/dl    Non HDL Cholesterol 111 <130 mg/dL       If you have any questions or concerns, please call the clinic at the number listed above.       Sincerely,      The office of  VENKATESH Trevino                
[phone]

## 2020-03-10 ENCOUNTER — INFUSION THERAPY VISIT (OUTPATIENT)
Dept: INFUSION THERAPY | Facility: OTHER | Age: 70
End: 2020-03-10
Attending: INTERNAL MEDICINE
Payer: MEDICARE

## 2020-03-10 ENCOUNTER — APPOINTMENT (OUTPATIENT)
Dept: LAB | Facility: OTHER | Age: 70
End: 2020-03-10
Attending: INTERNAL MEDICINE
Payer: MEDICARE

## 2020-03-10 VITALS
DIASTOLIC BLOOD PRESSURE: 79 MMHG | TEMPERATURE: 98.5 F | BODY MASS INDEX: 27.93 KG/M2 | HEIGHT: 63 IN | RESPIRATION RATE: 16 BRPM | OXYGEN SATURATION: 98 % | SYSTOLIC BLOOD PRESSURE: 118 MMHG | WEIGHT: 157.63 LBS | HEART RATE: 76 BPM

## 2020-03-10 DIAGNOSIS — C90.00 MULTIPLE MYELOMA NOT HAVING ACHIEVED REMISSION (H): Primary | ICD-10-CM

## 2020-03-10 LAB
ALBUMIN SERPL-MCNC: 3.3 G/DL (ref 3.4–5)
ALP SERPL-CCNC: 115 U/L (ref 40–150)
ALT SERPL W P-5'-P-CCNC: 34 U/L (ref 0–50)
AST SERPL W P-5'-P-CCNC: 24 U/L (ref 0–45)
BASOPHILS # BLD AUTO: 0 10E9/L (ref 0–0.2)
BASOPHILS NFR BLD AUTO: 0.4 %
BILIRUB DIRECT SERPL-MCNC: 0.1 MG/DL (ref 0–0.2)
BILIRUB SERPL-MCNC: 0.4 MG/DL (ref 0.2–1.3)
DIFFERENTIAL METHOD BLD: ABNORMAL
EOSINOPHIL # BLD AUTO: 0.1 10E9/L (ref 0–0.7)
EOSINOPHIL NFR BLD AUTO: 2.2 %
ERYTHROCYTE [DISTWIDTH] IN BLOOD BY AUTOMATED COUNT: 14.8 % (ref 10–15)
HCT VFR BLD AUTO: 34.1 % (ref 35–47)
HGB BLD-MCNC: 11.4 G/DL (ref 11.7–15.7)
IMM GRANULOCYTES # BLD: 0.1 10E9/L (ref 0–0.4)
IMM GRANULOCYTES NFR BLD: 1.5 %
LYMPHOCYTES # BLD AUTO: 1.3 10E9/L (ref 0.8–5.3)
LYMPHOCYTES NFR BLD AUTO: 29.1 %
MCH RBC QN AUTO: 31.7 PG (ref 26.5–33)
MCHC RBC AUTO-ENTMCNC: 33.4 G/DL (ref 31.5–36.5)
MCV RBC AUTO: 95 FL (ref 78–100)
MONOCYTES # BLD AUTO: 0.9 10E9/L (ref 0–1.3)
MONOCYTES NFR BLD AUTO: 19 %
NEUTROPHILS # BLD AUTO: 2.2 10E9/L (ref 1.6–8.3)
NEUTROPHILS NFR BLD AUTO: 47.8 %
NRBC # BLD AUTO: 0 10*3/UL
NRBC BLD AUTO-RTO: 0 /100
PLATELET # BLD AUTO: 225 10E9/L (ref 150–450)
PROT SERPL-MCNC: 8.6 G/DL (ref 6.8–8.8)
RBC # BLD AUTO: 3.6 10E12/L (ref 3.8–5.2)
WBC # BLD AUTO: 4.6 10E9/L (ref 4–11)

## 2020-03-10 PROCEDURE — 00000402 ZZHCL STATISTIC TOTAL PROTEIN: Mod: ZL | Performed by: NURSE PRACTITIONER

## 2020-03-10 PROCEDURE — 36415 COLL VENOUS BLD VENIPUNCTURE: CPT | Mod: ZL | Performed by: NURSE PRACTITIONER

## 2020-03-10 PROCEDURE — 25000128 H RX IP 250 OP 636: Performed by: NURSE PRACTITIONER

## 2020-03-10 PROCEDURE — 83883 ASSAY NEPHELOMETRY NOT SPEC: CPT | Mod: ZL | Performed by: NURSE PRACTITIONER

## 2020-03-10 PROCEDURE — 82784 ASSAY IGA/IGD/IGG/IGM EACH: CPT | Mod: ZL | Performed by: NURSE PRACTITIONER

## 2020-03-10 PROCEDURE — 96401 CHEMO ANTI-NEOPL SQ/IM: CPT

## 2020-03-10 PROCEDURE — 85025 COMPLETE CBC W/AUTO DIFF WBC: CPT | Mod: ZL | Performed by: NURSE PRACTITIONER

## 2020-03-10 PROCEDURE — 80076 HEPATIC FUNCTION PANEL: CPT | Mod: ZL | Performed by: NURSE PRACTITIONER

## 2020-03-10 PROCEDURE — 84165 PROTEIN E-PHORESIS SERUM: CPT | Mod: ZL | Performed by: NURSE PRACTITIONER

## 2020-03-10 PROCEDURE — 86334 IMMUNOFIX E-PHORESIS SERUM: CPT | Mod: ZL | Performed by: NURSE PRACTITIONER

## 2020-03-10 RX ORDER — ONDANSETRON 4 MG/1
8 TABLET, FILM COATED ORAL ONCE
Status: COMPLETED | OUTPATIENT
Start: 2020-03-10 | End: 2020-03-10

## 2020-03-10 RX ADMIN — BORTEZOMIB 2.6 MG: 3.5 INJECTION, POWDER, LYOPHILIZED, FOR SOLUTION INTRAVENOUS; SUBCUTANEOUS at 14:43

## 2020-03-10 RX ADMIN — ONDANSETRON HYDROCHLORIDE 8 MG: 4 TABLET, FILM COATED ORAL at 14:09

## 2020-03-10 ASSESSMENT — MIFFLIN-ST. JEOR: SCORE: 1199

## 2020-03-10 ASSESSMENT — PAIN SCALES - GENERAL: PAINLEVEL: NO PAIN (0)

## 2020-03-10 NOTE — PROGRESS NOTES
Patient is a 69 year old female here today for injection of Velcade per order of . Patient meets parameters for today's infusion. Patient identified with two identifiers, order verified, and verbal consent for today's infusion obtained from patient. Written consent for treatment is on file and valid.    Today's lab values: WBC: 4.6, HGB: 11.4, PLT: 225  ANC: 2.2. Patient meets order parameters for today's treatment.    Patient denies questions or concerns regarding injection and/or medication(s) being administered.    Independent dose check completed with Barron Booth RN.    Velcade injected SQ into left upper outer arm at 45 degree angle per protocol rotating sites.     Injection administered per protocol. Patient tolerated infusion without incident, no signs or symptoms of adverse reaction noted. Patient denies pain or discomfort.     Covered with a sterile bandage. Pt instructed to leave bandage intact for a minimum of one hour, and to call with questions or concerns. Patient states understanding, discharged.

## 2020-03-12 LAB
IGA SERPL-MCNC: 10 MG/DL (ref 84–499)
IGG SERPL-MCNC: 3191 MG/DL (ref 610–1616)
IGM SERPL-MCNC: 10 MG/DL (ref 35–242)
KAPPA LC UR-MCNC: 0.71 MG/DL (ref 0.33–1.94)
KAPPA LC/LAMBDA SER: 3.23 {RATIO} (ref 0.26–1.65)
LAMBDA LC SERPL-MCNC: 0.22 MG/DL (ref 0.57–2.63)

## 2020-03-13 LAB
ALBUMIN SERPL ELPH-MCNC: 4.1 G/DL (ref 3.7–5.1)
ALPHA1 GLOB SERPL ELPH-MCNC: 0.3 G/DL (ref 0.2–0.4)
ALPHA2 GLOB SERPL ELPH-MCNC: 0.7 G/DL (ref 0.5–0.9)
B-GLOBULIN SERPL ELPH-MCNC: 0.6 G/DL (ref 0.6–1)
GAMMA GLOB SERPL ELPH-MCNC: 2.8 G/DL (ref 0.7–1.6)
M PROTEIN SERPL ELPH-MCNC: 2.5 G/DL
PROT PATTERN SERPL ELPH-IMP: ABNORMAL
PROT PATTERN SERPL IFE-IMP: NORMAL

## 2020-03-14 NOTE — PROGRESS NOTES
Oncology Follow-up Visit:  March 17, 2020    Reason for Visit:  Patient presents with:  RECHECK: Follow up Multiple myeloma not having achieved remission     Nursing Note and documentation reviewed: yes    HPI: This is a 69-year-old female patient who presents to the oncology clinic today for evaluation prior to receiving cycle 7 chemotherapy for Stage II-RISS IgG multiple myeloma diagnosed June 2019.  She progressed on Velcade and dexamethasone and is now receiving CyBorD with decreased dose of cytoxan related to LFT elevation.     She presents to the clinic today stating she is overall doing fairly well.  She does have some minor fatigue.  She has been trying to walk about 20 minutes almost daily.  She has been drinking adequate fluids and her appetite has been good.    Oncologic History:     12/31/2018   patient presented to the emergency room with vertigo and fatigue.  CT scan of the head was negative and subsequent stress test was negative.  5/3/2019  She was seen by her PCP who ordered lab work and noted a total protein of 12.9.  SPEP at that time showed an M spike of 6.2 with a large monoclonal protein seen in the gamma fraction.  Urine immunofixation showed a possible small protein band in the gamma fraction  5/31/2019  she was evaluated by Dr. Walker with Medical Oncology with plan to rule out myeloma and obtain bone marrow aspiration biopsy as well as a metastatic bone survey along with additional labwork  6/18/2019 she underwent bone marrow aspiration and biopsy  6/24/2019  She was seen again by Dr. Walker and CBC showed a hemoglobin of 9.3, M spike 7.3 with monoclonal IgG immunoglobulin of kappa light chain type; serum viscosity was 2.9; quantitative immunoglobulins showed an IgG of 8160, beta-2 microglobulin was 5.8, BUN was 21 with creatinine is 0.8 and total protein was 13.7.  Quantitative kappa/lambda free light chains showed an elevated ratio of 17.0 bone marrow aspiration biopsy showed plasma  cell myeloma with approximately 80% plasma cells.  Immunofixation showed IgG kappa and flow cytometry revealed kappa monotypic plasma cells consistent with clonal plasma cell neoplasm and FISH panel was pending at that time.  It was felt she had at least stage II disease based on her beta-2 microglobulin and anemia.  Plan was to treat with Velcade 1.3 mg per metered squared days 1, 4, 8 and 11/Decadron 40 mg on days 1, 8 and 15 initiation of Revlimid with the second cycle at 25 mg daily days 1 through 14.  Plan was to also obtain an MRI of the lumbar spine to rule out lytic lesion at L3.  She was initiated on Zovirax 400 mg p.o. twice daily.  6/25/2019  1st cycle of chemotherapy initiated  7/1/2019 note in chart regarding patient's large co-pay for the Revlimid and no plan at this point to initiate Revlimid and treat only with Velcade and Decadron per Dr. Walker  7/11/2019  MRI lumbar spine shows a pathologic superior endplate compression fracture at L3 without evidence of retropulsed fragment and innumerable enhancing lesions throughout the lumbar spine consistent with history of multiple myeloma.  9/10/2019  Increasing M-spike and kappa lambda ratio  10/1/2019  Initiation of CyBorD     Current Chemo Regime/TX:  Velcade 1.5mg.m2/cyclophosphamide 150mg every 7 days on days 1,8,15 and 22/decadron 40mg days 1,8,15,22       **Zometa 4mg every 3 months  Current Cycle: 7  # of completed cycles:  6     Previous treatment:   Velcade 1.3 mg per metered squared on days 1, 4, 8 and 11 with Decadron 40 mg on days 1, 8 and 15 x 4 cycles    Past Medical History:   Diagnosis Date     Arthritis      Depressive disorder      Essential hypertension 10/1/2015     Major depressive disorder, recurrent episode, mild (H) 10/1/2015     Mixed hyperlipidemia 10/1/2015     Multiple myeloma not having achieved remission (H) 6/24/2019     Other specified disorders of bladder 07/09/2012    Irritable Bladder     Seasonal allergies 10/1/2015  "    Unspecified essential hypertension 03/19/2007     Unspecified sinusitis (chronic) 09/05/2007       Past Surgical History:   Procedure Laterality Date     APPENDECTOMY       BONE MARROW BIOPSY, BONE SPECIMEN, NEEDLE/TROCAR N/A 6/18/2019    Procedure: BONE MARROW BIOPSY;  Surgeon: Maciej Sanz MD;  Location: [facility]     CHOLECYSTECTOMY       COLONOSCOPY  07-    repeat 10 years     COLONOSCOPY N/A 12/30/2016    Procedure: COLONOSCOPY;  Surgeon: Bhaskar Franklin DO;  Location: [facility]     SINUS SURGERY       TUBAL STERILIZATION         Family History   Problem Relation Age of Onset     Breast Cancer Mother 66        Cause of Death     Parkinsonism Father         \"Possible\"     Coronary Artery Disease Father      Pacemaker Father      Thyroid Disease Daughter      Diabetes No family hx of      Hypertension No family hx of      Hyperlipidemia No family hx of      Cerebrovascular Disease No family hx of      Colon Cancer No family hx of      Prostate Cancer No family hx of      Genetic Disorder No family hx of      Asthma No family hx of      Anesthesia Reaction No family hx of        Social History     Socioeconomic History     Marital status:      Spouse name: Not on file     Number of children: Not on file     Years of education: Not on file     Highest education level: Not on file   Occupational History     Occupation: Financial     Comment:  - (FT)   Social Needs     Financial resource strain: Not on file     Food insecurity     Worry: Not on file     Inability: Not on file     Transportation needs     Medical: Not on file     Non-medical: Not on file   Tobacco Use     Smoking status: Never Smoker     Smokeless tobacco: Never Used     Tobacco comment: Tried to Quit (YES); QUIT in 1971; Passive Exposure (NO)   Substance and Sexual Activity     Alcohol use: Yes     Comment: RARELY     Drug use: No     Sexual activity: Yes     Partners: Male     Birth control/protection: None "   Lifestyle     Physical activity     Days per week: Not on file     Minutes per session: Not on file     Stress: Not on file   Relationships     Social connections     Talks on phone: Not on file     Gets together: Not on file     Attends Shinto service: Not on file     Active member of club or organization: Not on file     Attends meetings of clubs or organizations: Not on file     Relationship status: Not on file     Intimate partner violence     Fear of current or ex partner: Not on file     Emotionally abused: Not on file     Physically abused: Not on file     Forced sexual activity: Not on file   Other Topics Concern      Service Not Asked     Blood Transfusions Not Asked     Caffeine Concern Yes     Comment: Coffee >6 cups daily     Occupational Exposure Not Asked     Hobby Hazards Not Asked     Sleep Concern Not Asked     Stress Concern Not Asked     Weight Concern Not Asked     Special Diet Not Asked     Back Care Not Asked     Exercise Not Asked     Bike Helmet Not Asked     Seat Belt Not Asked     Self-Exams Not Asked     Parent/sibling w/ CABG, MI or angioplasty before 65F 55M? No   Social History Narrative     Not on file       Current Outpatient Medications   Medication     acyclovir (ZOVIRAX) 400 MG tablet     atorvastatin (LIPITOR) 10 MG tablet     Bortezomib (VELCADE IJ)     cyclophosphamide 50 MG PO capsule     dexamethasone (DECADRON) 4 MG tablet     fluticasone (FLONASE) 50 MCG/ACT nasal spray     losartan (COZAAR) 50 MG tablet     sertraline (ZOLOFT) 50 MG tablet     EPINEPHrine (EPIPEN) 0.3 MG/0.3ML injection     LORazepam (ATIVAN) 0.5 MG tablet     ondansetron (ZOFRAN) 8 MG tablet     prochlorperazine (COMPAZINE) 10 MG tablet     No current facility-administered medications for this visit.         Allergies   Allergen Reactions     Lisinopril Cough     Phenylephrine Hcl Other (See Comments)     **Entex  HEADACHE (SEVERE)     Phenylpropanolamine Other (See Comments)      "**Entex  HEADACHE (SEVERE)     Pseudoephedrine Tannate Other (See Comments)     **Entex  HEADACHE (SEVERE)     Levofloxacin Rash     **Levaquin     Moxifloxacin Hcl [Avelox] Rash       Review Of Systems:  Constitutional:    denies fever, weight changes, chills, and night sweats.  Eyes:    denies blurred or double vision  Ears/Nose/Throat:   denies ear pain but still plugged, denies nose problems or difficulty swallowing  Respiratory:   denies shortness of breath, cough   Skin:   denies rash, lesions  Cardiovascular:   denies chest pain, palpitations, edema  Gastrointestinal:   denies abdominal pain, bloating, nausea, early satiety; no change in bowel habits or blood in stool  Genitourinary:   denies difficulty with urination, blood in urine  Musculoskeletal:    denies new muscle pain, bone pain-has some achiness  Neurologic:   denies lightheadedness, headaches, numbness or tingling  Psychiatric:   denies anxiety, depression  Hematologic/Lymphatic/Immunologic:   denies easy bruising, easy bleeding, lumps or bumps noted  Endocrine:   Denies increased thirst    Fatigue (0=no fatigue; 10=worst fatigue imaginable): 2    Pain  (0=no pain; 10=worst pain imaginable): 0    ECOG Performance Status: 1    Physical Exam:  /74   Pulse 90   Temp 98.8  F (37.1  C) (Tympanic)   Resp 18   Ht 1.6 m (5' 3\")   Wt 71.6 kg (157 lb 13.6 oz)   SpO2 97%   BMI 27.96 kg/m      GENERAL APPEARANCE: Healthy, alert and in no acute distress.  HEENT: Normocephalic, Sclerae anicteric. Oropharynx without ulcers, lesions, or thrush.  NECK:   No asymmetry or masses, no thyromegaly.  LYMPHATICS: No palpable cervical, supraclavicular, axillary, or inguinal nodes   RESP: Lungs clear to auscultation bilaterally, respirations regular and easy  CARDIOVASCULAR: Regular rate and rhythm. Normal S1, S2; no murmur, gallop, or rub.  ABDOMEN: Soft, nontender. Bowel sounds auscultated all 4 quadrants. No palpable organomegaly or masses.  MUSCULOSKELETAL: " Extremities without gross deformities noted. No edema of bilateral lower extremities.  NEURO: Alert and oriented x 3.  Gait steady.  PSYCHIATRIC: Mentation and affect appear normal.  Mood appropriate.    Laboratory:    Results for WILLIAM WILDER (MRN [id number]) as of 3/17/2020 09:53   Ref. Range 3/17/2020 08:39   Sodium Latest Ref Range: 133 - 144 mmol/L 136   Potassium Latest Ref Range: 3.4 - 5.3 mmol/L 3.9   Chloride Latest Ref Range: 94 - 109 mmol/L 106   Carbon Dioxide Latest Ref Range: 20 - 32 mmol/L 28   Urea Nitrogen Latest Ref Range: 7 - 30 mg/dL 18   Creatinine Latest Ref Range: 0.52 - 1.04 mg/dL 0.76   GFR Estimate Latest Ref Range: >60 mL/min/1.73_m2 79   GFR Estimate If Black Latest Ref Range: >60 mL/min/1.73_m2 >90   Calcium Latest Ref Range: 8.5 - 10.1 mg/dL 8.4 (L)   Anion Gap Latest Ref Range: 3 - 14 mmol/L 2 (L)   Albumin Latest Ref Range: 3.4 - 5.0 g/dL 3.3 (L)   Protein Total Latest Ref Range: 6.8 - 8.8 g/dL 8.5   Bilirubin Total Latest Ref Range: 0.2 - 1.3 mg/dL 0.4   Alkaline Phosphatase Latest Ref Range: 40 - 150 U/L 106   ALT Latest Ref Range: 0 - 50 U/L 35   AST Latest Ref Range: 0 - 45 U/L 21   Glucose Latest Ref Range: 70 - 99 mg/dL 85   WBC Latest Ref Range: 4.0 - 11.0 10e9/L 4.9   Hemoglobin Latest Ref Range: 11.7 - 15.7 g/dL 11.3 (L)   Hematocrit Latest Ref Range: 35.0 - 47.0 % 33.8 (L)   Platelet Count Latest Ref Range: 150 - 450 10e9/L 200   RBC Count Latest Ref Range: 3.8 - 5.2 10e12/L 3.54 (L)   MCV Latest Ref Range: 78 - 100 fl 96   MCH Latest Ref Range: 26.5 - 33.0 pg 31.9   MCHC Latest Ref Range: 31.5 - 36.5 g/dL 33.4   RDW Latest Ref Range: 10.0 - 15.0 % 14.7   Diff Method Unknown Automated Method   % Neutrophils Latest Units: % 53.5   % Lymphocytes Latest Units: % 23.9   % Monocytes Latest Units: % 18.6   % Eosinophils Latest Units: % 1.6   % Basophils Latest Units: % 0.4   % Immature Granulocytes Latest Units: % 2.0   Nucleated RBCs Latest Ref Range: 0 /100 0   Absolute  Neutrophil Latest Ref Range: 1.6 - 8.3 10e9/L 2.6   Absolute Lymphocytes Latest Ref Range: 0.8 - 5.3 10e9/L 1.2   Absolute Monocytes Latest Ref Range: 0.0 - 1.3 10e9/L 0.9   Absolute Eosinophils Latest Ref Range: 0.0 - 0.7 10e9/L 0.1   Absolute Basophils Latest Ref Range: 0.0 - 0.2 10e9/L 0.0   Abs Immature Granulocytes Latest Ref Range: 0 - 0.4 10e9/L 0.1   Absolute Nucleated RBC Unknown 0.0       Results for WILLIAM WILDER (MRN [id number]) as of 3/13/2020 19:51   Ref. Range 3/10/2020 12:33   Albumin Fraction Latest Ref Range: 3.7 - 5.1 g/dL 4.1   Alpha 1 Fraction Latest Ref Range: 0.2 - 0.4 g/dL 0.3   Alpha 2 Fraction Latest Ref Range: 0.5 - 0.9 g/dL 0.7   Beta Fraction Latest Ref Range: 0.6 - 1.0 g/dL 0.6   ELP Interpretation: Unknown Monoclonal protei...   Gamma Fraction Latest Ref Range: 0.7 - 1.6 g/dL 2.8 (H)   IGA Latest Ref Range: 84 - 499 mg/dL 10 (L)   IGG Latest Ref Range: 610 - 1,616 mg/dL 3,191 (H)   IGM Latest Ref Range: 35 - 242 mg/dL 10 (L)   Immunofixation ELP Unknown (Note)   Kappa Free Lt Chain Latest Ref Range: 0.33 - 1.94 mg/dL 0.71   Kappa Lambda Ratio Latest Ref Range: 0.26 - 1.65  3.23 (H)   Lambda Free Lt Chain Latest Ref Range: 0.57 - 2.63 mg/dL 0.22 (L)   Monoclonal Peak Latest Ref Range: 0.0 g/dL 2.5 (H)     Imaging Studies:  None completed for today's visit      ASSESSMENT/PLAN:    #1 Multiple myeloma:   IgG kappa multiple myeloma with 80% involvement of the bone marrow diagnosed June 2019 initially treated with Velcade and Decadron and received 4 cycles with increasing M-spike and kappa/lambda ratio.  Treatment changed to CyBorD on 10/1/2019 with elevating LFT's felt to be related to the Cytoxan so this was decreased with cycle 3 day 8.  M-spike down a bit more with slight further elevation of her ratio.  Will continue the same and she will initiate cycle 7 today and follow-up prior to cycle 8-day 1 with myeloma labs.       #2 myeloma lytic lesion: Due for Zometa infusion today and  again in April.     #3 elevated liver functions: LFTs are within normal limits and have been within normal limits throughout her last cycle.  I did discuss this with Dr. Walker and we will continue the Cytoxan dose at the decreased dose for the time being.  Continue to monitor her liver functions.    I encouraged patient to call with any questions or concerns.     Barbie Quintana, NP  APRN, FNP-BC, AOCNP

## 2020-03-17 ENCOUNTER — APPOINTMENT (OUTPATIENT)
Dept: LAB | Facility: OTHER | Age: 70
End: 2020-03-17
Attending: INTERNAL MEDICINE
Payer: MEDICARE

## 2020-03-17 ENCOUNTER — INFUSION THERAPY VISIT (OUTPATIENT)
Dept: INFUSION THERAPY | Facility: OTHER | Age: 70
End: 2020-03-17
Attending: INTERNAL MEDICINE
Payer: MEDICARE

## 2020-03-17 ENCOUNTER — PATIENT OUTREACH (OUTPATIENT)
Dept: CARE COORDINATION | Facility: OTHER | Age: 70
End: 2020-03-17

## 2020-03-17 ENCOUNTER — ONCOLOGY VISIT (OUTPATIENT)
Dept: ONCOLOGY | Facility: OTHER | Age: 70
End: 2020-03-17
Attending: NURSE PRACTITIONER
Payer: MEDICARE

## 2020-03-17 VITALS
HEART RATE: 70 BPM | WEIGHT: 157.85 LBS | RESPIRATION RATE: 16 BRPM | SYSTOLIC BLOOD PRESSURE: 131 MMHG | OXYGEN SATURATION: 95 % | DIASTOLIC BLOOD PRESSURE: 79 MMHG | BODY MASS INDEX: 27.97 KG/M2 | TEMPERATURE: 97.7 F | HEIGHT: 63 IN

## 2020-03-17 VITALS
BODY MASS INDEX: 27.97 KG/M2 | OXYGEN SATURATION: 97 % | TEMPERATURE: 98.8 F | HEIGHT: 63 IN | HEART RATE: 90 BPM | WEIGHT: 157.85 LBS | SYSTOLIC BLOOD PRESSURE: 108 MMHG | RESPIRATION RATE: 18 BRPM | DIASTOLIC BLOOD PRESSURE: 74 MMHG

## 2020-03-17 DIAGNOSIS — C90.00 MULTIPLE MYELOMA NOT HAVING ACHIEVED REMISSION (H): ICD-10-CM

## 2020-03-17 DIAGNOSIS — C90.00 MULTIPLE MYELOMA NOT HAVING ACHIEVED REMISSION (H): Primary | ICD-10-CM

## 2020-03-17 DIAGNOSIS — R79.89 ELEVATED LFTS: ICD-10-CM

## 2020-03-17 DIAGNOSIS — M89.9 BONE LESION: ICD-10-CM

## 2020-03-17 LAB
ALBUMIN SERPL-MCNC: 3.3 G/DL (ref 3.4–5)
ALP SERPL-CCNC: 106 U/L (ref 40–150)
ALT SERPL W P-5'-P-CCNC: 35 U/L (ref 0–50)
ANION GAP SERPL CALCULATED.3IONS-SCNC: 2 MMOL/L (ref 3–14)
AST SERPL W P-5'-P-CCNC: 21 U/L (ref 0–45)
BASOPHILS # BLD AUTO: 0 10E9/L (ref 0–0.2)
BASOPHILS NFR BLD AUTO: 0.4 %
BILIRUB SERPL-MCNC: 0.4 MG/DL (ref 0.2–1.3)
BUN SERPL-MCNC: 18 MG/DL (ref 7–30)
CALCIUM SERPL-MCNC: 8.4 MG/DL (ref 8.5–10.1)
CHLORIDE SERPL-SCNC: 106 MMOL/L (ref 94–109)
CO2 SERPL-SCNC: 28 MMOL/L (ref 20–32)
CREAT SERPL-MCNC: 0.76 MG/DL (ref 0.52–1.04)
DIFFERENTIAL METHOD BLD: ABNORMAL
EOSINOPHIL # BLD AUTO: 0.1 10E9/L (ref 0–0.7)
EOSINOPHIL NFR BLD AUTO: 1.6 %
ERYTHROCYTE [DISTWIDTH] IN BLOOD BY AUTOMATED COUNT: 14.7 % (ref 10–15)
GFR SERPL CREATININE-BSD FRML MDRD: 79 ML/MIN/{1.73_M2}
GLUCOSE SERPL-MCNC: 85 MG/DL (ref 70–99)
HCT VFR BLD AUTO: 33.8 % (ref 35–47)
HGB BLD-MCNC: 11.3 G/DL (ref 11.7–15.7)
IMM GRANULOCYTES # BLD: 0.1 10E9/L (ref 0–0.4)
IMM GRANULOCYTES NFR BLD: 2 %
LYMPHOCYTES # BLD AUTO: 1.2 10E9/L (ref 0.8–5.3)
LYMPHOCYTES NFR BLD AUTO: 23.9 %
MCH RBC QN AUTO: 31.9 PG (ref 26.5–33)
MCHC RBC AUTO-ENTMCNC: 33.4 G/DL (ref 31.5–36.5)
MCV RBC AUTO: 96 FL (ref 78–100)
MONOCYTES # BLD AUTO: 0.9 10E9/L (ref 0–1.3)
MONOCYTES NFR BLD AUTO: 18.6 %
NEUTROPHILS # BLD AUTO: 2.6 10E9/L (ref 1.6–8.3)
NEUTROPHILS NFR BLD AUTO: 53.5 %
NRBC # BLD AUTO: 0 10*3/UL
NRBC BLD AUTO-RTO: 0 /100
PLATELET # BLD AUTO: 200 10E9/L (ref 150–450)
POTASSIUM SERPL-SCNC: 3.9 MMOL/L (ref 3.4–5.3)
PROT SERPL-MCNC: 8.5 G/DL (ref 6.8–8.8)
RBC # BLD AUTO: 3.54 10E12/L (ref 3.8–5.2)
SODIUM SERPL-SCNC: 136 MMOL/L (ref 133–144)
WBC # BLD AUTO: 4.9 10E9/L (ref 4–11)

## 2020-03-17 PROCEDURE — G0463 HOSPITAL OUTPT CLINIC VISIT: HCPCS | Mod: 25

## 2020-03-17 PROCEDURE — 99213 OFFICE O/P EST LOW 20 MIN: CPT | Performed by: NURSE PRACTITIONER

## 2020-03-17 PROCEDURE — 80053 COMPREHEN METABOLIC PANEL: CPT | Mod: ZL | Performed by: INTERNAL MEDICINE

## 2020-03-17 PROCEDURE — 36415 COLL VENOUS BLD VENIPUNCTURE: CPT | Mod: ZL | Performed by: INTERNAL MEDICINE

## 2020-03-17 PROCEDURE — G0463 HOSPITAL OUTPT CLINIC VISIT: HCPCS

## 2020-03-17 PROCEDURE — 25000128 H RX IP 250 OP 636: Mod: JW | Performed by: NURSE PRACTITIONER

## 2020-03-17 PROCEDURE — 25000128 H RX IP 250 OP 636: Performed by: INTERNAL MEDICINE

## 2020-03-17 PROCEDURE — 85025 COMPLETE CBC W/AUTO DIFF WBC: CPT | Mod: ZL | Performed by: INTERNAL MEDICINE

## 2020-03-17 PROCEDURE — 96401 CHEMO ANTI-NEOPL SQ/IM: CPT

## 2020-03-17 RX ORDER — EPINEPHRINE 1 MG/ML
0.3 INJECTION, SOLUTION, CONCENTRATE INTRAVENOUS EVERY 5 MIN PRN
Status: CANCELLED | OUTPATIENT
Start: 2020-03-24

## 2020-03-17 RX ORDER — LORAZEPAM 2 MG/ML
0.5 INJECTION INTRAMUSCULAR EVERY 4 HOURS PRN
Status: CANCELLED
Start: 2020-03-24

## 2020-03-17 RX ORDER — EPINEPHRINE 1 MG/ML
0.3 INJECTION, SOLUTION, CONCENTRATE INTRAVENOUS EVERY 5 MIN PRN
Status: CANCELLED | OUTPATIENT
Start: 2020-03-31

## 2020-03-17 RX ORDER — METHYLPREDNISOLONE SODIUM SUCCINATE 125 MG/2ML
125 INJECTION, POWDER, LYOPHILIZED, FOR SOLUTION INTRAMUSCULAR; INTRAVENOUS
Status: CANCELLED
Start: 2020-04-07

## 2020-03-17 RX ORDER — NALOXONE HYDROCHLORIDE 0.4 MG/ML
.1-.4 INJECTION, SOLUTION INTRAMUSCULAR; INTRAVENOUS; SUBCUTANEOUS
Status: CANCELLED | OUTPATIENT
Start: 2020-03-24

## 2020-03-17 RX ORDER — NALOXONE HYDROCHLORIDE 0.4 MG/ML
.1-.4 INJECTION, SOLUTION INTRAMUSCULAR; INTRAVENOUS; SUBCUTANEOUS
Status: CANCELLED | OUTPATIENT
Start: 2020-03-17

## 2020-03-17 RX ORDER — DIPHENHYDRAMINE HYDROCHLORIDE 50 MG/ML
50 INJECTION INTRAMUSCULAR; INTRAVENOUS
Status: CANCELLED
Start: 2020-03-17

## 2020-03-17 RX ORDER — MEPERIDINE HYDROCHLORIDE 25 MG/ML
25 INJECTION INTRAMUSCULAR; INTRAVENOUS; SUBCUTANEOUS EVERY 30 MIN PRN
Status: CANCELLED | OUTPATIENT
Start: 2020-03-24

## 2020-03-17 RX ORDER — LORAZEPAM 2 MG/ML
0.5 INJECTION INTRAMUSCULAR EVERY 4 HOURS PRN
Status: CANCELLED
Start: 2020-03-31

## 2020-03-17 RX ORDER — EPINEPHRINE 0.3 MG/.3ML
0.3 INJECTION SUBCUTANEOUS EVERY 5 MIN PRN
Status: CANCELLED | OUTPATIENT
Start: 2020-03-24

## 2020-03-17 RX ORDER — NALOXONE HYDROCHLORIDE 0.4 MG/ML
.1-.4 INJECTION, SOLUTION INTRAMUSCULAR; INTRAVENOUS; SUBCUTANEOUS
Status: CANCELLED | OUTPATIENT
Start: 2020-04-07

## 2020-03-17 RX ORDER — METHYLPREDNISOLONE SODIUM SUCCINATE 125 MG/2ML
125 INJECTION, POWDER, LYOPHILIZED, FOR SOLUTION INTRAMUSCULAR; INTRAVENOUS
Status: CANCELLED
Start: 2020-03-17

## 2020-03-17 RX ORDER — LORAZEPAM 2 MG/ML
0.5 INJECTION INTRAMUSCULAR EVERY 4 HOURS PRN
Status: CANCELLED
Start: 2020-04-07

## 2020-03-17 RX ORDER — SODIUM CHLORIDE 9 MG/ML
1000 INJECTION, SOLUTION INTRAVENOUS CONTINUOUS PRN
Status: CANCELLED
Start: 2020-03-24

## 2020-03-17 RX ORDER — EPINEPHRINE 1 MG/ML
0.3 INJECTION, SOLUTION, CONCENTRATE INTRAVENOUS EVERY 5 MIN PRN
Status: CANCELLED | OUTPATIENT
Start: 2020-04-07

## 2020-03-17 RX ORDER — NALOXONE HYDROCHLORIDE 0.4 MG/ML
.1-.4 INJECTION, SOLUTION INTRAMUSCULAR; INTRAVENOUS; SUBCUTANEOUS
Status: CANCELLED | OUTPATIENT
Start: 2020-03-31

## 2020-03-17 RX ORDER — EPINEPHRINE 1 MG/ML
0.3 INJECTION, SOLUTION, CONCENTRATE INTRAVENOUS EVERY 5 MIN PRN
Status: CANCELLED | OUTPATIENT
Start: 2020-03-17

## 2020-03-17 RX ORDER — ALBUTEROL SULFATE 0.83 MG/ML
2.5 SOLUTION RESPIRATORY (INHALATION)
Status: CANCELLED | OUTPATIENT
Start: 2020-04-07

## 2020-03-17 RX ORDER — SODIUM CHLORIDE 9 MG/ML
1000 INJECTION, SOLUTION INTRAVENOUS CONTINUOUS PRN
Status: CANCELLED
Start: 2020-03-31

## 2020-03-17 RX ORDER — SODIUM CHLORIDE 9 MG/ML
1000 INJECTION, SOLUTION INTRAVENOUS CONTINUOUS PRN
Status: CANCELLED
Start: 2020-03-17

## 2020-03-17 RX ORDER — ONDANSETRON 4 MG/1
8 TABLET, FILM COATED ORAL ONCE
Status: CANCELLED
Start: 2020-03-24

## 2020-03-17 RX ORDER — EPINEPHRINE 0.3 MG/.3ML
0.3 INJECTION SUBCUTANEOUS EVERY 5 MIN PRN
Status: CANCELLED | OUTPATIENT
Start: 2020-03-17

## 2020-03-17 RX ORDER — DIPHENHYDRAMINE HYDROCHLORIDE 50 MG/ML
50 INJECTION INTRAMUSCULAR; INTRAVENOUS
Status: CANCELLED
Start: 2020-04-07

## 2020-03-17 RX ORDER — ALBUTEROL SULFATE 0.83 MG/ML
2.5 SOLUTION RESPIRATORY (INHALATION)
Status: CANCELLED | OUTPATIENT
Start: 2020-03-24

## 2020-03-17 RX ORDER — EPINEPHRINE 0.3 MG/.3ML
0.3 INJECTION SUBCUTANEOUS EVERY 5 MIN PRN
Status: CANCELLED | OUTPATIENT
Start: 2020-03-31

## 2020-03-17 RX ORDER — EPINEPHRINE 0.3 MG/.3ML
0.3 INJECTION SUBCUTANEOUS EVERY 5 MIN PRN
Status: CANCELLED | OUTPATIENT
Start: 2020-04-07

## 2020-03-17 RX ORDER — MEPERIDINE HYDROCHLORIDE 25 MG/ML
25 INJECTION INTRAMUSCULAR; INTRAVENOUS; SUBCUTANEOUS EVERY 30 MIN PRN
Status: CANCELLED | OUTPATIENT
Start: 2020-03-17

## 2020-03-17 RX ORDER — MEPERIDINE HYDROCHLORIDE 25 MG/ML
25 INJECTION INTRAMUSCULAR; INTRAVENOUS; SUBCUTANEOUS EVERY 30 MIN PRN
Status: CANCELLED | OUTPATIENT
Start: 2020-03-31

## 2020-03-17 RX ORDER — ONDANSETRON 4 MG/1
8 TABLET, FILM COATED ORAL ONCE
Status: COMPLETED | OUTPATIENT
Start: 2020-03-17 | End: 2020-03-17

## 2020-03-17 RX ORDER — ONDANSETRON 8 MG/1
8 TABLET, FILM COATED ORAL ONCE
Status: CANCELLED
Start: 2020-04-07

## 2020-03-17 RX ORDER — SODIUM CHLORIDE 9 MG/ML
1000 INJECTION, SOLUTION INTRAVENOUS CONTINUOUS PRN
Status: CANCELLED
Start: 2020-04-07

## 2020-03-17 RX ORDER — ONDANSETRON 4 MG/1
8 TABLET, FILM COATED ORAL ONCE
Status: CANCELLED
Start: 2020-03-31

## 2020-03-17 RX ORDER — ALBUTEROL SULFATE 0.83 MG/ML
2.5 SOLUTION RESPIRATORY (INHALATION)
Status: CANCELLED | OUTPATIENT
Start: 2020-03-17

## 2020-03-17 RX ORDER — ALBUTEROL SULFATE 0.83 MG/ML
2.5 SOLUTION RESPIRATORY (INHALATION)
Status: CANCELLED | OUTPATIENT
Start: 2020-03-31

## 2020-03-17 RX ORDER — DIPHENHYDRAMINE HYDROCHLORIDE 50 MG/ML
50 INJECTION INTRAMUSCULAR; INTRAVENOUS
Status: CANCELLED
Start: 2020-03-24

## 2020-03-17 RX ORDER — LORAZEPAM 2 MG/ML
0.5 INJECTION INTRAMUSCULAR EVERY 4 HOURS PRN
Status: CANCELLED
Start: 2020-03-17

## 2020-03-17 RX ORDER — METHYLPREDNISOLONE SODIUM SUCCINATE 125 MG/2ML
125 INJECTION, POWDER, LYOPHILIZED, FOR SOLUTION INTRAMUSCULAR; INTRAVENOUS
Status: CANCELLED
Start: 2020-03-31

## 2020-03-17 RX ORDER — ALBUTEROL SULFATE 90 UG/1
1-2 AEROSOL, METERED RESPIRATORY (INHALATION)
Status: CANCELLED
Start: 2020-04-07

## 2020-03-17 RX ORDER — ALBUTEROL SULFATE 90 UG/1
1-2 AEROSOL, METERED RESPIRATORY (INHALATION)
Status: CANCELLED
Start: 2020-03-17

## 2020-03-17 RX ORDER — METHYLPREDNISOLONE SODIUM SUCCINATE 125 MG/2ML
125 INJECTION, POWDER, LYOPHILIZED, FOR SOLUTION INTRAMUSCULAR; INTRAVENOUS
Status: CANCELLED
Start: 2020-03-24

## 2020-03-17 RX ORDER — ALBUTEROL SULFATE 90 UG/1
1-2 AEROSOL, METERED RESPIRATORY (INHALATION)
Status: CANCELLED
Start: 2020-03-24

## 2020-03-17 RX ORDER — MEPERIDINE HYDROCHLORIDE 25 MG/ML
25 INJECTION INTRAMUSCULAR; INTRAVENOUS; SUBCUTANEOUS EVERY 30 MIN PRN
Status: CANCELLED | OUTPATIENT
Start: 2020-04-07

## 2020-03-17 RX ORDER — DIPHENHYDRAMINE HYDROCHLORIDE 50 MG/ML
50 INJECTION INTRAMUSCULAR; INTRAVENOUS
Status: CANCELLED
Start: 2020-03-31

## 2020-03-17 RX ORDER — ALBUTEROL SULFATE 90 UG/1
1-2 AEROSOL, METERED RESPIRATORY (INHALATION)
Status: CANCELLED
Start: 2020-03-31

## 2020-03-17 RX ADMIN — BORTEZOMIB 2.6 MG: 3.5 INJECTION, POWDER, LYOPHILIZED, FOR SOLUTION INTRAVENOUS; SUBCUTANEOUS at 11:04

## 2020-03-17 RX ADMIN — ONDANSETRON HYDROCHLORIDE 8 MG: 4 TABLET, FILM COATED ORAL at 11:02

## 2020-03-17 ASSESSMENT — MIFFLIN-ST. JEOR
SCORE: 1200.13
SCORE: 1200

## 2020-03-17 ASSESSMENT — PAIN SCALES - GENERAL: PAINLEVEL: NO PAIN (0)

## 2020-03-17 NOTE — PATIENT INSTRUCTIONS
We would like to see you back per your calender.     Your prescription for cytoxan has been sent to: [facility].      When you are in need of a refill, please call your pharmacy and they will send us a request.     If you have any questions please call [phone]    Other instructions:  None

## 2020-03-17 NOTE — PATIENT INSTRUCTIONS
We will see you back as planned. If you have any questions or concerns, we can be reached Monday through Friday 8am - 430pm at [phone] ([facility] [place]). If you have concerns related to a potential reaction/side effect after hours/weekends/holiday's, please seek emergent medical care.

## 2020-03-17 NOTE — PROGRESS NOTES
Patient is a 69 year old female here today for injection of Velcade per order of . Patient meets parameters for today's infusion. Patient identified with two identifiers, order verified, and verbal consent for today's infusion obtained from patient. Written consent for treatment is on file and valid.    Today's lab values: WBC: 4.9, HGB: 11.3, PLT: 200, ANC: 2.6. Patient meets order parameters for today's treatment.  Patient denies questions or concerns regarding injection and/or medication(s) being administered.    Independent dose check with Yancy Quintanilla RN prior to release of drug.    1104 Velcade injected SQ into right upper outer arm at 45 degree angle per protocol rotating sites.     Injection administered per protocol. Patient tolerated infusion without incident, no signs or symptoms of adverse reaction noted. Patient denies pain or discomfort.     Covered with a sterile bandage. Pt instructed to leave bandage intact for a minimum of one hour, and to call with questions or concerns. Copy of appointments, discharge instructions, and after visit summary (AVS) provided to patient. Patient states understanding, discharged ambulatory.

## 2020-03-17 NOTE — NURSING NOTE
"Chief Complaint   Patient presents with     RECHECK     Follow up Multiple myeloma not having achieved remission       Initial /74   Pulse 90   Temp 98.8  F (37.1  C) (Tympanic)   Resp 18   Ht 1.6 m (5' 3\")   Wt 71.6 kg (157 lb 13.6 oz)   SpO2 97%   BMI 27.96 kg/m   Estimated body mass index is 27.96 kg/m  as calculated from the following:    Height as of this encounter: 1.6 m (5' 3\").    Weight as of this encounter: 71.6 kg (157 lb 13.6 oz).  Medication Reconciliation: complete.  Immunizations and advance directives status reviewed. Pain scale =0 , PHQ-2=0.            Vilma Chi LPN    "

## 2020-03-17 NOTE — PROGRESS NOTES
Clinic Care Coordination Contact  Care Team Conversations    VM received from [facility] intake  Coordinator regarding patients and her spouse and the palliative care referral. She reports d/t the recent COVID 19 precautions [facility] is not currently admitting palliative maintenance patients for a couple of weeks. Discussed this with patient she is okay with this. She does voice concerns that if her spouse declines suddenly she doesn't want to not have services in place. Encouraged patient that if there was a decline that she should keep us posted and will place referral for homecare skilled palliative services at the advise of Geraldine GUSMAN.   Called [facility], they are scheduled to set up delivery with patient on Friday 3-.  Christine Otoole RN Oncology Care Coordinator

## 2020-03-24 ENCOUNTER — INFUSION THERAPY VISIT (OUTPATIENT)
Dept: INFUSION THERAPY | Facility: OTHER | Age: 70
End: 2020-03-24
Attending: INTERNAL MEDICINE
Payer: MEDICARE

## 2020-03-24 VITALS
OXYGEN SATURATION: 97 % | RESPIRATION RATE: 16 BRPM | SYSTOLIC BLOOD PRESSURE: 132 MMHG | HEIGHT: 63 IN | TEMPERATURE: 97.7 F | HEART RATE: 72 BPM | DIASTOLIC BLOOD PRESSURE: 76 MMHG | BODY MASS INDEX: 27.97 KG/M2 | WEIGHT: 157.85 LBS

## 2020-03-24 DIAGNOSIS — C90.00 MULTIPLE MYELOMA NOT HAVING ACHIEVED REMISSION (H): Primary | ICD-10-CM

## 2020-03-24 LAB
ALBUMIN SERPL-MCNC: 3.3 G/DL (ref 3.4–5)
ALP SERPL-CCNC: 106 U/L (ref 40–150)
ALT SERPL W P-5'-P-CCNC: 31 U/L (ref 0–50)
AST SERPL W P-5'-P-CCNC: 23 U/L (ref 0–45)
BASOPHILS # BLD AUTO: 0 10E9/L (ref 0–0.2)
BASOPHILS NFR BLD AUTO: 0.4 %
BILIRUB DIRECT SERPL-MCNC: <0.1 MG/DL (ref 0–0.2)
BILIRUB SERPL-MCNC: 0.3 MG/DL (ref 0.2–1.3)
DIFFERENTIAL METHOD BLD: ABNORMAL
EOSINOPHIL # BLD AUTO: 0.1 10E9/L (ref 0–0.7)
EOSINOPHIL NFR BLD AUTO: 1.6 %
ERYTHROCYTE [DISTWIDTH] IN BLOOD BY AUTOMATED COUNT: 14.8 % (ref 10–15)
HCT VFR BLD AUTO: 34.1 % (ref 35–47)
HGB BLD-MCNC: 11.5 G/DL (ref 11.7–15.7)
IMM GRANULOCYTES # BLD: 0.1 10E9/L (ref 0–0.4)
IMM GRANULOCYTES NFR BLD: 1.4 %
LYMPHOCYTES # BLD AUTO: 1.4 10E9/L (ref 0.8–5.3)
LYMPHOCYTES NFR BLD AUTO: 27.1 %
MCH RBC QN AUTO: 31.4 PG (ref 26.5–33)
MCHC RBC AUTO-ENTMCNC: 33.7 G/DL (ref 31.5–36.5)
MCV RBC AUTO: 93 FL (ref 78–100)
MONOCYTES # BLD AUTO: 0.9 10E9/L (ref 0–1.3)
MONOCYTES NFR BLD AUTO: 16.8 %
NEUTROPHILS # BLD AUTO: 2.7 10E9/L (ref 1.6–8.3)
NEUTROPHILS NFR BLD AUTO: 52.7 %
NRBC # BLD AUTO: 0 10*3/UL
NRBC BLD AUTO-RTO: 0 /100
PLATELET # BLD AUTO: 221 10E9/L (ref 150–450)
PROT SERPL-MCNC: 8.8 G/DL (ref 6.8–8.8)
RBC # BLD AUTO: 3.66 10E12/L (ref 3.8–5.2)
WBC # BLD AUTO: 5.1 10E9/L (ref 4–11)

## 2020-03-24 PROCEDURE — 80076 HEPATIC FUNCTION PANEL: CPT | Mod: ZL | Performed by: NURSE PRACTITIONER

## 2020-03-24 PROCEDURE — 85025 COMPLETE CBC W/AUTO DIFF WBC: CPT | Mod: ZL | Performed by: NURSE PRACTITIONER

## 2020-03-24 PROCEDURE — 96401 CHEMO ANTI-NEOPL SQ/IM: CPT

## 2020-03-24 PROCEDURE — 25000128 H RX IP 250 OP 636: Performed by: NURSE PRACTITIONER

## 2020-03-24 PROCEDURE — 36415 COLL VENOUS BLD VENIPUNCTURE: CPT | Mod: ZL | Performed by: NURSE PRACTITIONER

## 2020-03-24 PROCEDURE — G0463 HOSPITAL OUTPT CLINIC VISIT: HCPCS | Mod: 25

## 2020-03-24 RX ORDER — ONDANSETRON 4 MG/1
8 TABLET, FILM COATED ORAL ONCE
Status: COMPLETED | OUTPATIENT
Start: 2020-03-24 | End: 2020-03-24

## 2020-03-24 RX ADMIN — ONDANSETRON HYDROCHLORIDE 8 MG: 4 TABLET, FILM COATED ORAL at 15:28

## 2020-03-24 RX ADMIN — BORTEZOMIB 2.6 MG: 3.5 INJECTION, POWDER, LYOPHILIZED, FOR SOLUTION INTRAVENOUS; SUBCUTANEOUS at 15:29

## 2020-03-24 ASSESSMENT — MIFFLIN-ST. JEOR: SCORE: 1200

## 2020-03-24 NOTE — PROGRESS NOTES
Peripheral blood draw done per facility policy/procedure from right antecubital space. Patient tolerated well. 2 tubes drawn and sent to lab with . Patient arm bandaged with guaze and coban. Discharged.

## 2020-03-24 NOTE — PROGRESS NOTES
Patient is a 69 year old female here today for injection of Velcade per order of . Patient meets parameters for today's infusion. Patient identified with two identifiers, order verified, and verbal consent for today's infusion obtained from patient. Written consent for treatment is on file and valid.    Today's lab values: WBC: 5.1, HGB: 11.5, PLT: 221  ANC: 2.7. Patient meets order parameters for today's treatment.  Patient denies questions or concerns regarding injection and/or medication(s) being administered.    Independent dose check with Yancy Quintanilla RN prior to release of drug.    1529 Velcade injected SQ into left upper outer arm at 45 degree angle  per protocol rotating sites.     Injection administered per protocol. Patient tolerated infusion without incident, no signs or symptoms of adverse reaction noted. Patient denies pain or discomfort.     Covered with a sterile bandage. Pt instructed to leave bandage intact for a minimum of one hour, and to call with questions or concerns. Patient declines copy of appointments, discharge instructions, and after visit summary (AVS). Patient states understanding, discharged ambulatory.

## 2020-03-31 ENCOUNTER — TELEPHONE (OUTPATIENT)
Dept: ONCOLOGY | Facility: OTHER | Age: 70
End: 2020-03-31

## 2020-03-31 NOTE — TELEPHONE ENCOUNTER
Telephone call to patient and discussed the need to change her Velcade treatment to Thursday.  She agreed.  Appointment given.

## 2020-04-02 ENCOUNTER — INFUSION THERAPY VISIT (OUTPATIENT)
Dept: INFUSION THERAPY | Facility: OTHER | Age: 70
End: 2020-04-02
Attending: INTERNAL MEDICINE
Payer: MEDICARE

## 2020-04-02 VITALS
SYSTOLIC BLOOD PRESSURE: 107 MMHG | RESPIRATION RATE: 16 BRPM | BODY MASS INDEX: 27.89 KG/M2 | OXYGEN SATURATION: 97 % | HEIGHT: 63 IN | DIASTOLIC BLOOD PRESSURE: 65 MMHG | HEART RATE: 81 BPM | WEIGHT: 157.41 LBS | TEMPERATURE: 98.1 F

## 2020-04-02 DIAGNOSIS — C90.00 MULTIPLE MYELOMA NOT HAVING ACHIEVED REMISSION (H): Primary | ICD-10-CM

## 2020-04-02 LAB
ALBUMIN SERPL-MCNC: 3.4 G/DL (ref 3.4–5)
ALP SERPL-CCNC: 110 U/L (ref 40–150)
ALT SERPL W P-5'-P-CCNC: 29 U/L (ref 0–50)
AST SERPL W P-5'-P-CCNC: 22 U/L (ref 0–45)
BASOPHILS # BLD AUTO: 0 10E9/L (ref 0–0.2)
BASOPHILS NFR BLD AUTO: 0.4 %
BILIRUB DIRECT SERPL-MCNC: <0.1 MG/DL (ref 0–0.2)
BILIRUB SERPL-MCNC: 0.3 MG/DL (ref 0.2–1.3)
DIFFERENTIAL METHOD BLD: ABNORMAL
EOSINOPHIL # BLD AUTO: 0.2 10E9/L (ref 0–0.7)
EOSINOPHIL NFR BLD AUTO: 2.9 %
ERYTHROCYTE [DISTWIDTH] IN BLOOD BY AUTOMATED COUNT: 14.8 % (ref 10–15)
HCT VFR BLD AUTO: 31.7 % (ref 35–47)
HGB BLD-MCNC: 10.8 G/DL (ref 11.7–15.7)
IMM GRANULOCYTES # BLD: 0.1 10E9/L (ref 0–0.4)
IMM GRANULOCYTES NFR BLD: 1.8 %
LYMPHOCYTES # BLD AUTO: 1.3 10E9/L (ref 0.8–5.3)
LYMPHOCYTES NFR BLD AUTO: 25.9 %
MCH RBC QN AUTO: 32 PG (ref 26.5–33)
MCHC RBC AUTO-ENTMCNC: 34.1 G/DL (ref 31.5–36.5)
MCV RBC AUTO: 94 FL (ref 78–100)
MONOCYTES # BLD AUTO: 0.9 10E9/L (ref 0–1.3)
MONOCYTES NFR BLD AUTO: 17.2 %
NEUTROPHILS # BLD AUTO: 2.7 10E9/L (ref 1.6–8.3)
NEUTROPHILS NFR BLD AUTO: 51.8 %
NRBC # BLD AUTO: 0 10*3/UL
NRBC BLD AUTO-RTO: 0 /100
PLATELET # BLD AUTO: 246 10E9/L (ref 150–450)
PROT SERPL-MCNC: 8.7 G/DL (ref 6.8–8.8)
RBC # BLD AUTO: 3.37 10E12/L (ref 3.8–5.2)
WBC # BLD AUTO: 5.1 10E9/L (ref 4–11)

## 2020-04-02 PROCEDURE — 80076 HEPATIC FUNCTION PANEL: CPT | Mod: ZL | Performed by: NURSE PRACTITIONER

## 2020-04-02 PROCEDURE — 96401 CHEMO ANTI-NEOPL SQ/IM: CPT

## 2020-04-02 PROCEDURE — 36415 COLL VENOUS BLD VENIPUNCTURE: CPT | Mod: ZL | Performed by: NURSE PRACTITIONER

## 2020-04-02 PROCEDURE — 85025 COMPLETE CBC W/AUTO DIFF WBC: CPT | Mod: ZL | Performed by: NURSE PRACTITIONER

## 2020-04-02 PROCEDURE — 25000128 H RX IP 250 OP 636: Mod: JW | Performed by: NURSE PRACTITIONER

## 2020-04-02 RX ORDER — ONDANSETRON 4 MG/1
8 TABLET, FILM COATED ORAL ONCE
Status: COMPLETED | OUTPATIENT
Start: 2020-04-02 | End: 2020-04-02

## 2020-04-02 RX ADMIN — BORTEZOMIB 2.6 MG: 3.5 INJECTION, POWDER, LYOPHILIZED, FOR SOLUTION INTRAVENOUS; SUBCUTANEOUS at 15:25

## 2020-04-02 RX ADMIN — ONDANSETRON HYDROCHLORIDE 8 MG: 4 TABLET, FILM COATED ORAL at 15:21

## 2020-04-02 ASSESSMENT — MIFFLIN-ST. JEOR: SCORE: 1198

## 2020-04-02 NOTE — PATIENT INSTRUCTIONS
We will see you back as planned. If you have any questions or concerns, we can be reached Monday through Friday 8am - 430pm at [phone] ([facility] [place]). If you have concerns related to a potential reaction/side effect after hours/weekends/holiday's, please seek emergent medical care.

## 2020-04-02 NOTE — PROGRESS NOTES
Patient is a 69 year old female here today for injection of Velcade per order of . Patient meets parameters for today's infusion. Patient identified with two identifiers, order verified, and verbal consent for today's infusion obtained from patient. Written consent for treatment is on file and valid.    Today's lab values: WBC: 5.1, HGB: 10.8, PLT: 246  ANC: 2.7. Patient meets order parameters for today's treatment.    Independent dose check with Anne Lan RN prior to release of drug.    Patient denies questions or concerns regarding injection and/or medication(s) being administered.    31720 Velcade injected SQ into right upper outer arm at 45 degree angle per protocol rotating sites.     Injection administered per protocol. Patient tolerated infusion without incident, no signs or symptoms of adverse reaction noted. Patient denies pain or discomfort.     Covered with a sterile bandage. Pt instructed to leave bandage intact for a minimum of one hour, and to call with questions or concerns. Copy of appointments, discharge instructions, and after visit summary (AVS) provided to patient. Patient states understanding, discharged ambulatory.

## 2020-04-09 ENCOUNTER — INFUSION THERAPY VISIT (OUTPATIENT)
Dept: INFUSION THERAPY | Facility: OTHER | Age: 70
End: 2020-04-09
Attending: INTERNAL MEDICINE
Payer: MEDICARE

## 2020-04-09 VITALS
WEIGHT: 161.6 LBS | DIASTOLIC BLOOD PRESSURE: 76 MMHG | TEMPERATURE: 96.5 F | HEIGHT: 63 IN | SYSTOLIC BLOOD PRESSURE: 109 MMHG | RESPIRATION RATE: 16 BRPM | BODY MASS INDEX: 28.63 KG/M2 | OXYGEN SATURATION: 99 % | HEART RATE: 84 BPM

## 2020-04-09 DIAGNOSIS — C90.00 MULTIPLE MYELOMA NOT HAVING ACHIEVED REMISSION (H): Primary | ICD-10-CM

## 2020-04-09 LAB
ALBUMIN SERPL-MCNC: 3.2 G/DL (ref 3.4–5)
ALP SERPL-CCNC: 104 U/L (ref 40–150)
ALT SERPL W P-5'-P-CCNC: 30 U/L (ref 0–50)
AST SERPL W P-5'-P-CCNC: 19 U/L (ref 0–45)
BASOPHILS # BLD AUTO: 0 10E9/L (ref 0–0.2)
BASOPHILS NFR BLD AUTO: 0.5 %
BILIRUB DIRECT SERPL-MCNC: <0.1 MG/DL (ref 0–0.2)
BILIRUB SERPL-MCNC: 0.3 MG/DL (ref 0.2–1.3)
DIFFERENTIAL METHOD BLD: ABNORMAL
EOSINOPHIL # BLD AUTO: 0.1 10E9/L (ref 0–0.7)
EOSINOPHIL NFR BLD AUTO: 2.4 %
ERYTHROCYTE [DISTWIDTH] IN BLOOD BY AUTOMATED COUNT: 14.8 % (ref 10–15)
HCT VFR BLD AUTO: 32.4 % (ref 35–47)
HGB BLD-MCNC: 11.1 G/DL (ref 11.7–15.7)
IMM GRANULOCYTES # BLD: 0.1 10E9/L (ref 0–0.4)
IMM GRANULOCYTES NFR BLD: 1.2 %
LYMPHOCYTES # BLD AUTO: 1.2 10E9/L (ref 0.8–5.3)
LYMPHOCYTES NFR BLD AUTO: 29.4 %
MCH RBC QN AUTO: 32.2 PG (ref 26.5–33)
MCHC RBC AUTO-ENTMCNC: 34.3 G/DL (ref 31.5–36.5)
MCV RBC AUTO: 94 FL (ref 78–100)
MONOCYTES # BLD AUTO: 0.7 10E9/L (ref 0–1.3)
MONOCYTES NFR BLD AUTO: 17.6 %
NEUTROPHILS # BLD AUTO: 2 10E9/L (ref 1.6–8.3)
NEUTROPHILS NFR BLD AUTO: 48.9 %
NRBC # BLD AUTO: 0 10*3/UL
NRBC BLD AUTO-RTO: 0 /100
PLATELET # BLD AUTO: 221 10E9/L (ref 150–450)
PROT SERPL-MCNC: 8.3 G/DL (ref 6.8–8.8)
RBC # BLD AUTO: 3.45 10E12/L (ref 3.8–5.2)
WBC # BLD AUTO: 4.2 10E9/L (ref 4–11)

## 2020-04-09 PROCEDURE — 85025 COMPLETE CBC W/AUTO DIFF WBC: CPT | Mod: ZL | Performed by: NURSE PRACTITIONER

## 2020-04-09 PROCEDURE — 25000128 H RX IP 250 OP 636: Performed by: NURSE PRACTITIONER

## 2020-04-09 PROCEDURE — 83883 ASSAY NEPHELOMETRY NOT SPEC: CPT | Mod: ZL | Performed by: NURSE PRACTITIONER

## 2020-04-09 PROCEDURE — 80076 HEPATIC FUNCTION PANEL: CPT | Mod: ZL | Performed by: NURSE PRACTITIONER

## 2020-04-09 PROCEDURE — 86334 IMMUNOFIX E-PHORESIS SERUM: CPT | Mod: ZL | Performed by: NURSE PRACTITIONER

## 2020-04-09 PROCEDURE — 82784 ASSAY IGA/IGD/IGG/IGM EACH: CPT | Mod: ZL | Performed by: NURSE PRACTITIONER

## 2020-04-09 PROCEDURE — 96401 CHEMO ANTI-NEOPL SQ/IM: CPT

## 2020-04-09 PROCEDURE — 00000402 ZZHCL STATISTIC TOTAL PROTEIN: Mod: ZL | Performed by: NURSE PRACTITIONER

## 2020-04-09 PROCEDURE — 36415 COLL VENOUS BLD VENIPUNCTURE: CPT | Mod: ZL | Performed by: NURSE PRACTITIONER

## 2020-04-09 PROCEDURE — 82232 ASSAY OF BETA-2 PROTEIN: CPT | Mod: ZL | Performed by: NURSE PRACTITIONER

## 2020-04-09 PROCEDURE — 85810 BLOOD VISCOSITY EXAMINATION: CPT | Mod: ZL | Performed by: NURSE PRACTITIONER

## 2020-04-09 PROCEDURE — 84165 PROTEIN E-PHORESIS SERUM: CPT | Mod: ZL | Performed by: NURSE PRACTITIONER

## 2020-04-09 RX ORDER — ONDANSETRON 4 MG/1
8 TABLET, FILM COATED ORAL ONCE
Status: COMPLETED | OUTPATIENT
Start: 2020-04-09 | End: 2020-04-09

## 2020-04-09 RX ADMIN — BORTEZOMIB 2.6 MG: 3.5 INJECTION, POWDER, LYOPHILIZED, FOR SOLUTION INTRAVENOUS; SUBCUTANEOUS at 15:53

## 2020-04-09 RX ADMIN — ONDANSETRON HYDROCHLORIDE 8 MG: 4 TABLET, FILM COATED ORAL at 15:50

## 2020-04-09 ASSESSMENT — MIFFLIN-ST. JEOR
SCORE: 1221
SCORE: 1217

## 2020-04-09 NOTE — PROGRESS NOTES
Patient is a 69 year old female  her today for injection of Velcade per order of . Patient meets parameters for today's infusion. Patient identified with two identifiers, order verified, and verbal consent for today's infusion obtained from patient. Written consent for treatment is on file and valid.    Recent lab values: WBC: 4.2, HGB: 11.1, PLT: 221  ANC: 2.0. Patient meets order parameters for today's treatment.    Velcade dose of verified with Barron RICH RN also discussed with JESUS Quintana NP regarding increased weight of patient and dosing threshold.  Per Jesus Quintana NP OK to administer at same dose as ordered in treatment plan.prior to release of drug.      Patient denies questions or concerns regarding injection and/or medication(s) being administered.  Velcade injected SQ into left upper outer arm at 45 degree angle per protocol rotating sites.     Injection administered per protocol. Patient tolerated infusion without incident, no signs or symptoms of adverse reaction noted. Patient denies pain or discomfort.     Covered with a sterile bandage. Pt instructed to leave bandage intact for a minimum of one hour, and to call with questions or concerns. Copy of appointments, discharge instructions, and after visit summary (AVS) provided to patient. Patient states understanding, discharged ambulatory.

## 2020-04-13 LAB
ALBUMIN SERPL ELPH-MCNC: 4.2 G/DL (ref 3.7–5.1)
ALPHA1 GLOB SERPL ELPH-MCNC: 0.3 G/DL (ref 0.2–0.4)
ALPHA2 GLOB SERPL ELPH-MCNC: 0.7 G/DL (ref 0.5–0.9)
B-GLOBULIN SERPL ELPH-MCNC: 0.6 G/DL (ref 0.6–1)
GAMMA GLOB SERPL ELPH-MCNC: 2.6 G/DL (ref 0.7–1.6)
IGA SERPL-MCNC: 13 MG/DL (ref 84–499)
IGG SERPL-MCNC: 2901 MG/DL (ref 610–1616)
IGM SERPL-MCNC: 11 MG/DL (ref 35–242)
KAPPA LC UR-MCNC: 0.72 MG/DL (ref 0.33–1.94)
KAPPA LC/LAMBDA SER: 2.67 {RATIO} (ref 0.26–1.65)
LAMBDA LC SERPL-MCNC: 0.27 MG/DL (ref 0.57–2.63)
M PROTEIN SERPL ELPH-MCNC: 2.3 G/DL
PROT PATTERN SERPL ELPH-IMP: ABNORMAL
PROT PATTERN SERPL IFE-IMP: NORMAL
VISC SER: 1.7 CP (ref 1.4–1.8)

## 2020-04-14 LAB — B2 MICROGLOB SERPL-MCNC: 3.1 MG/L

## 2020-04-15 ENCOUNTER — TELEPHONE (OUTPATIENT)
Dept: ONCOLOGY | Facility: OTHER | Age: 70
End: 2020-04-15

## 2020-04-15 RX ORDER — ONDANSETRON 8 MG/1
8 TABLET, FILM COATED ORAL ONCE
Status: CANCELLED
Start: 2020-04-15

## 2020-04-15 NOTE — TELEPHONE ENCOUNTER
TC to [facility] to reorder cyclophosphamide. They report that they will reach out to the patient when it is time to fill.  Christine Otoole RN Oncology Care Coordinator

## 2020-04-16 ENCOUNTER — ONCOLOGY VISIT (OUTPATIENT)
Dept: ONCOLOGY | Facility: OTHER | Age: 70
End: 2020-04-16
Attending: NURSE PRACTITIONER
Payer: MEDICARE

## 2020-04-16 ENCOUNTER — INFUSION THERAPY VISIT (OUTPATIENT)
Dept: INFUSION THERAPY | Facility: OTHER | Age: 70
End: 2020-04-16
Attending: INTERNAL MEDICINE
Payer: MEDICARE

## 2020-04-16 VITALS
TEMPERATURE: 96.7 F | SYSTOLIC BLOOD PRESSURE: 135 MMHG | DIASTOLIC BLOOD PRESSURE: 84 MMHG | HEART RATE: 80 BPM | HEIGHT: 63 IN | BODY MASS INDEX: 29.02 KG/M2 | OXYGEN SATURATION: 98 % | WEIGHT: 163.8 LBS

## 2020-04-16 VITALS
WEIGHT: 163.8 LBS | DIASTOLIC BLOOD PRESSURE: 84 MMHG | SYSTOLIC BLOOD PRESSURE: 135 MMHG | BODY MASS INDEX: 29.02 KG/M2 | HEIGHT: 63 IN | HEART RATE: 80 BPM | RESPIRATION RATE: 18 BRPM | OXYGEN SATURATION: 98 % | TEMPERATURE: 96.7 F

## 2020-04-16 DIAGNOSIS — C90.00 MULTIPLE MYELOMA NOT HAVING ACHIEVED REMISSION (H): Primary | ICD-10-CM

## 2020-04-16 LAB
ALBUMIN SERPL-MCNC: 3.3 G/DL (ref 3.4–5)
ALP SERPL-CCNC: 108 U/L (ref 40–150)
ALT SERPL W P-5'-P-CCNC: 25 U/L (ref 0–50)
ANION GAP SERPL CALCULATED.3IONS-SCNC: 2 MMOL/L (ref 3–14)
AST SERPL W P-5'-P-CCNC: 21 U/L (ref 0–45)
BASOPHILS # BLD AUTO: 0 10E9/L (ref 0–0.2)
BASOPHILS NFR BLD AUTO: 0.6 %
BILIRUB SERPL-MCNC: 0.4 MG/DL (ref 0.2–1.3)
BUN SERPL-MCNC: 15 MG/DL (ref 7–30)
CALCIUM SERPL-MCNC: 8.7 MG/DL (ref 8.5–10.1)
CHLORIDE SERPL-SCNC: 105 MMOL/L (ref 94–109)
CO2 SERPL-SCNC: 29 MMOL/L (ref 20–32)
CREAT SERPL-MCNC: 0.75 MG/DL (ref 0.52–1.04)
DIFFERENTIAL METHOD BLD: ABNORMAL
EOSINOPHIL # BLD AUTO: 0.1 10E9/L (ref 0–0.7)
EOSINOPHIL NFR BLD AUTO: 1.9 %
ERYTHROCYTE [DISTWIDTH] IN BLOOD BY AUTOMATED COUNT: 14.6 % (ref 10–15)
GFR SERPL CREATININE-BSD FRML MDRD: 80 ML/MIN/{1.73_M2}
GLUCOSE SERPL-MCNC: 111 MG/DL (ref 70–99)
HCT VFR BLD AUTO: 34.9 % (ref 35–47)
HGB BLD-MCNC: 11.8 G/DL (ref 11.7–15.7)
IMM GRANULOCYTES # BLD: 0.1 10E9/L (ref 0–0.4)
IMM GRANULOCYTES NFR BLD: 1.5 %
LYMPHOCYTES # BLD AUTO: 1.3 10E9/L (ref 0.8–5.3)
LYMPHOCYTES NFR BLD AUTO: 26 %
MCH RBC QN AUTO: 31.8 PG (ref 26.5–33)
MCHC RBC AUTO-ENTMCNC: 33.8 G/DL (ref 31.5–36.5)
MCV RBC AUTO: 94 FL (ref 78–100)
MONOCYTES # BLD AUTO: 0.8 10E9/L (ref 0–1.3)
MONOCYTES NFR BLD AUTO: 16.5 %
NEUTROPHILS # BLD AUTO: 2.6 10E9/L (ref 1.6–8.3)
NEUTROPHILS NFR BLD AUTO: 53.5 %
NRBC # BLD AUTO: 0 10*3/UL
NRBC BLD AUTO-RTO: 0 /100
PLATELET # BLD AUTO: 188 10E9/L (ref 150–450)
POTASSIUM SERPL-SCNC: 3.8 MMOL/L (ref 3.4–5.3)
PROT SERPL-MCNC: 8.4 G/DL (ref 6.8–8.8)
RBC # BLD AUTO: 3.71 10E12/L (ref 3.8–5.2)
SODIUM SERPL-SCNC: 136 MMOL/L (ref 133–144)
WBC # BLD AUTO: 4.8 10E9/L (ref 4–11)

## 2020-04-16 PROCEDURE — 99213 OFFICE O/P EST LOW 20 MIN: CPT | Performed by: NURSE PRACTITIONER

## 2020-04-16 PROCEDURE — 96401 CHEMO ANTI-NEOPL SQ/IM: CPT

## 2020-04-16 PROCEDURE — 80053 COMPREHEN METABOLIC PANEL: CPT | Mod: ZL | Performed by: INTERNAL MEDICINE

## 2020-04-16 PROCEDURE — G0463 HOSPITAL OUTPT CLINIC VISIT: HCPCS

## 2020-04-16 PROCEDURE — 85025 COMPLETE CBC W/AUTO DIFF WBC: CPT | Mod: ZL | Performed by: INTERNAL MEDICINE

## 2020-04-16 PROCEDURE — G0463 HOSPITAL OUTPT CLINIC VISIT: HCPCS | Mod: 25

## 2020-04-16 PROCEDURE — 36415 COLL VENOUS BLD VENIPUNCTURE: CPT | Mod: ZL | Performed by: INTERNAL MEDICINE

## 2020-04-16 PROCEDURE — 25000128 H RX IP 250 OP 636: Performed by: NURSE PRACTITIONER

## 2020-04-16 RX ORDER — METHYLPREDNISOLONE SODIUM SUCCINATE 125 MG/2ML
125 INJECTION, POWDER, LYOPHILIZED, FOR SOLUTION INTRAMUSCULAR; INTRAVENOUS
Status: CANCELLED
Start: 2020-04-23

## 2020-04-16 RX ORDER — EPINEPHRINE 0.3 MG/.3ML
0.3 INJECTION SUBCUTANEOUS EVERY 5 MIN PRN
Status: CANCELLED | OUTPATIENT
Start: 2020-05-07

## 2020-04-16 RX ORDER — MEPERIDINE HYDROCHLORIDE 25 MG/ML
25 INJECTION INTRAMUSCULAR; INTRAVENOUS; SUBCUTANEOUS EVERY 30 MIN PRN
Status: CANCELLED | OUTPATIENT
Start: 2020-04-16

## 2020-04-16 RX ORDER — SODIUM CHLORIDE 9 MG/ML
1000 INJECTION, SOLUTION INTRAVENOUS CONTINUOUS PRN
Status: CANCELLED
Start: 2020-05-07

## 2020-04-16 RX ORDER — ALBUTEROL SULFATE 90 UG/1
1-2 AEROSOL, METERED RESPIRATORY (INHALATION)
Status: CANCELLED
Start: 2020-05-07

## 2020-04-16 RX ORDER — ALBUTEROL SULFATE 90 UG/1
1-2 AEROSOL, METERED RESPIRATORY (INHALATION)
Status: CANCELLED
Start: 2020-04-16

## 2020-04-16 RX ORDER — DIPHENHYDRAMINE HYDROCHLORIDE 50 MG/ML
50 INJECTION INTRAMUSCULAR; INTRAVENOUS
Status: CANCELLED
Start: 2020-05-07

## 2020-04-16 RX ORDER — NALOXONE HYDROCHLORIDE 0.4 MG/ML
.1-.4 INJECTION, SOLUTION INTRAMUSCULAR; INTRAVENOUS; SUBCUTANEOUS
Status: CANCELLED | OUTPATIENT
Start: 2020-04-16

## 2020-04-16 RX ORDER — EPINEPHRINE 0.3 MG/.3ML
0.3 INJECTION SUBCUTANEOUS EVERY 5 MIN PRN
Status: CANCELLED | OUTPATIENT
Start: 2020-04-23

## 2020-04-16 RX ORDER — EPINEPHRINE 0.3 MG/.3ML
0.3 INJECTION SUBCUTANEOUS EVERY 5 MIN PRN
Status: CANCELLED | OUTPATIENT
Start: 2020-04-30

## 2020-04-16 RX ORDER — DIPHENHYDRAMINE HYDROCHLORIDE 50 MG/ML
50 INJECTION INTRAMUSCULAR; INTRAVENOUS
Status: CANCELLED
Start: 2020-04-30

## 2020-04-16 RX ORDER — LORAZEPAM 2 MG/ML
0.5 INJECTION INTRAMUSCULAR EVERY 4 HOURS PRN
Status: CANCELLED
Start: 2020-05-07

## 2020-04-16 RX ORDER — EPINEPHRINE 1 MG/ML
0.3 INJECTION, SOLUTION, CONCENTRATE INTRAVENOUS EVERY 5 MIN PRN
Status: CANCELLED | OUTPATIENT
Start: 2020-04-16

## 2020-04-16 RX ORDER — ALBUTEROL SULFATE 0.83 MG/ML
2.5 SOLUTION RESPIRATORY (INHALATION)
Status: CANCELLED | OUTPATIENT
Start: 2020-04-16

## 2020-04-16 RX ORDER — DIPHENHYDRAMINE HYDROCHLORIDE 50 MG/ML
50 INJECTION INTRAMUSCULAR; INTRAVENOUS
Status: CANCELLED
Start: 2020-04-23

## 2020-04-16 RX ORDER — ONDANSETRON 4 MG/1
8 TABLET, FILM COATED ORAL ONCE
Status: COMPLETED | OUTPATIENT
Start: 2020-04-16 | End: 2020-04-16

## 2020-04-16 RX ORDER — ALBUTEROL SULFATE 0.83 MG/ML
2.5 SOLUTION RESPIRATORY (INHALATION)
Status: CANCELLED | OUTPATIENT
Start: 2020-04-23

## 2020-04-16 RX ORDER — MEPERIDINE HYDROCHLORIDE 25 MG/ML
25 INJECTION INTRAMUSCULAR; INTRAVENOUS; SUBCUTANEOUS EVERY 30 MIN PRN
Status: CANCELLED | OUTPATIENT
Start: 2020-05-07

## 2020-04-16 RX ORDER — EPINEPHRINE 0.3 MG/.3ML
0.3 INJECTION SUBCUTANEOUS EVERY 5 MIN PRN
Status: CANCELLED | OUTPATIENT
Start: 2020-04-16

## 2020-04-16 RX ORDER — ALBUTEROL SULFATE 0.83 MG/ML
2.5 SOLUTION RESPIRATORY (INHALATION)
Status: CANCELLED | OUTPATIENT
Start: 2020-05-07

## 2020-04-16 RX ORDER — ONDANSETRON 4 MG/1
8 TABLET, FILM COATED ORAL ONCE
Status: CANCELLED
Start: 2020-04-23

## 2020-04-16 RX ORDER — METHYLPREDNISOLONE SODIUM SUCCINATE 125 MG/2ML
125 INJECTION, POWDER, LYOPHILIZED, FOR SOLUTION INTRAMUSCULAR; INTRAVENOUS
Status: CANCELLED
Start: 2020-04-16

## 2020-04-16 RX ORDER — DIPHENHYDRAMINE HYDROCHLORIDE 50 MG/ML
50 INJECTION INTRAMUSCULAR; INTRAVENOUS
Status: CANCELLED
Start: 2020-04-16

## 2020-04-16 RX ORDER — LORAZEPAM 2 MG/ML
0.5 INJECTION INTRAMUSCULAR EVERY 4 HOURS PRN
Status: CANCELLED
Start: 2020-04-23

## 2020-04-16 RX ORDER — SODIUM CHLORIDE 9 MG/ML
1000 INJECTION, SOLUTION INTRAVENOUS CONTINUOUS PRN
Status: CANCELLED
Start: 2020-04-23

## 2020-04-16 RX ORDER — SODIUM CHLORIDE 9 MG/ML
1000 INJECTION, SOLUTION INTRAVENOUS CONTINUOUS PRN
Status: CANCELLED
Start: 2020-04-30

## 2020-04-16 RX ORDER — MEPERIDINE HYDROCHLORIDE 25 MG/ML
25 INJECTION INTRAMUSCULAR; INTRAVENOUS; SUBCUTANEOUS EVERY 30 MIN PRN
Status: CANCELLED | OUTPATIENT
Start: 2020-04-30

## 2020-04-16 RX ORDER — ONDANSETRON 4 MG/1
8 TABLET, FILM COATED ORAL ONCE
Status: CANCELLED
Start: 2020-04-30

## 2020-04-16 RX ORDER — MEPERIDINE HYDROCHLORIDE 25 MG/ML
25 INJECTION INTRAMUSCULAR; INTRAVENOUS; SUBCUTANEOUS EVERY 30 MIN PRN
Status: CANCELLED | OUTPATIENT
Start: 2020-04-23

## 2020-04-16 RX ORDER — NALOXONE HYDROCHLORIDE 0.4 MG/ML
.1-.4 INJECTION, SOLUTION INTRAMUSCULAR; INTRAVENOUS; SUBCUTANEOUS
Status: CANCELLED | OUTPATIENT
Start: 2020-04-23

## 2020-04-16 RX ORDER — EPINEPHRINE 1 MG/ML
0.3 INJECTION, SOLUTION, CONCENTRATE INTRAVENOUS EVERY 5 MIN PRN
Status: CANCELLED | OUTPATIENT
Start: 2020-04-30

## 2020-04-16 RX ORDER — ONDANSETRON 4 MG/1
8 TABLET, FILM COATED ORAL ONCE
Status: CANCELLED
Start: 2020-05-07

## 2020-04-16 RX ORDER — NALOXONE HYDROCHLORIDE 0.4 MG/ML
.1-.4 INJECTION, SOLUTION INTRAMUSCULAR; INTRAVENOUS; SUBCUTANEOUS
Status: CANCELLED | OUTPATIENT
Start: 2020-05-07

## 2020-04-16 RX ORDER — SODIUM CHLORIDE 9 MG/ML
1000 INJECTION, SOLUTION INTRAVENOUS CONTINUOUS PRN
Status: CANCELLED
Start: 2020-04-16

## 2020-04-16 RX ORDER — METHYLPREDNISOLONE SODIUM SUCCINATE 125 MG/2ML
125 INJECTION, POWDER, LYOPHILIZED, FOR SOLUTION INTRAMUSCULAR; INTRAVENOUS
Status: CANCELLED
Start: 2020-05-07

## 2020-04-16 RX ORDER — NALOXONE HYDROCHLORIDE 0.4 MG/ML
.1-.4 INJECTION, SOLUTION INTRAMUSCULAR; INTRAVENOUS; SUBCUTANEOUS
Status: CANCELLED | OUTPATIENT
Start: 2020-04-30

## 2020-04-16 RX ORDER — ALBUTEROL SULFATE 90 UG/1
1-2 AEROSOL, METERED RESPIRATORY (INHALATION)
Status: CANCELLED
Start: 2020-04-23

## 2020-04-16 RX ORDER — LORAZEPAM 2 MG/ML
0.5 INJECTION INTRAMUSCULAR EVERY 4 HOURS PRN
Status: CANCELLED
Start: 2020-04-30

## 2020-04-16 RX ORDER — LORAZEPAM 2 MG/ML
0.5 INJECTION INTRAMUSCULAR EVERY 4 HOURS PRN
Status: CANCELLED
Start: 2020-04-16

## 2020-04-16 RX ORDER — METHYLPREDNISOLONE SODIUM SUCCINATE 125 MG/2ML
125 INJECTION, POWDER, LYOPHILIZED, FOR SOLUTION INTRAMUSCULAR; INTRAVENOUS
Status: CANCELLED
Start: 2020-04-30

## 2020-04-16 RX ORDER — EPINEPHRINE 1 MG/ML
0.3 INJECTION, SOLUTION, CONCENTRATE INTRAVENOUS EVERY 5 MIN PRN
Status: CANCELLED | OUTPATIENT
Start: 2020-05-07

## 2020-04-16 RX ORDER — EPINEPHRINE 1 MG/ML
0.3 INJECTION, SOLUTION, CONCENTRATE INTRAVENOUS EVERY 5 MIN PRN
Status: CANCELLED | OUTPATIENT
Start: 2020-04-23

## 2020-04-16 RX ORDER — ALBUTEROL SULFATE 0.83 MG/ML
2.5 SOLUTION RESPIRATORY (INHALATION)
Status: CANCELLED | OUTPATIENT
Start: 2020-04-30

## 2020-04-16 RX ORDER — ALBUTEROL SULFATE 90 UG/1
1-2 AEROSOL, METERED RESPIRATORY (INHALATION)
Status: CANCELLED
Start: 2020-04-30

## 2020-04-16 RX ADMIN — ONDANSETRON HYDROCHLORIDE 8 MG: 4 TABLET, FILM COATED ORAL at 14:59

## 2020-04-16 RX ADMIN — BORTEZOMIB 2.6 MG: 3.5 INJECTION, POWDER, LYOPHILIZED, FOR SOLUTION INTRAVENOUS; SUBCUTANEOUS at 15:00

## 2020-04-16 ASSESSMENT — PAIN SCALES - GENERAL: PAINLEVEL: NO PAIN (0)

## 2020-04-16 ASSESSMENT — MIFFLIN-ST. JEOR
SCORE: 1227
SCORE: 1227.13

## 2020-04-16 NOTE — PATIENT INSTRUCTIONS
We will see you back as planned. If you have any questions or concerns, we can be reached Monday through Friday 8am - 430pm at [phone] ([facility] [place] [facility] [place] If you have concerns related to a potential reaction/side effect after hours/weekends/holiday's, please seek emergent medical care.

## 2020-04-16 NOTE — PROGRESS NOTES
Oncology Follow-up Visit:  April 16, 2020    Reason for Visit:  Patient presents with:  RECHECK: Follow up Multiple Myeloma     Nursing Note and documentation reviewed: yes    HPI: This is a 69-year-old female patient who presents to the oncology clinic today for evaluation prior to receiving cycle 8 chemotherapy for Stage II-RISS IgG multiple myeloma diagnosed June 2019.  She progressed on Velcade and dexamethasone and is now receiving CyBorD with decreased dose of cytoxan related to LFT elevation.     She presents to the clinic today can stating she continues to feel very good.  She has developed what she feels may be a little TMJ to the right jaw that may go up into the right ear at times.  She continues with tubes in her ears but is having no issues.  She does get somewhat winded with exertion at times but does very well when she is on the treadmill and does this most days.  She has had some increased stressors at home and is trying to deal with this as well as she can.  She gets an occasional irregular heartbeat the evening of and day after the Velcade.  She denies any chest pains.  She gets fatigued and rests when needed.    Oncologic History:     12/31/2018   patient presented to the emergency room with vertigo and fatigue.  CT scan of the head was negative and subsequent stress test was negative.  5/3/2019  She was seen by her PCP who ordered lab work and noted a total protein of 12.9.  SPEP at that time showed an M spike of 6.2 with a large monoclonal protein seen in the gamma fraction.  Urine immunofixation showed a possible small protein band in the gamma fraction  5/31/2019  she was evaluated by Dr. Walker with Medical Oncology with plan to rule out myeloma and obtain bone marrow aspiration biopsy as well as a metastatic bone survey along with additional labwork  6/18/2019 she underwent bone marrow aspiration and biopsy  6/24/2019  She was seen again by Dr. Walker and CBC showed a hemoglobin of 9.3,  M spike 7.3 with monoclonal IgG immunoglobulin of kappa light chain type; serum viscosity was 2.9; quantitative immunoglobulins showed an IgG of 8160, beta-2 microglobulin was 5.8, BUN was 21 with creatinine is 0.8 and total protein was 13.7.  Quantitative kappa/lambda free light chains showed an elevated ratio of 17.0 bone marrow aspiration biopsy showed plasma cell myeloma with approximately 80% plasma cells.  Immunofixation showed IgG kappa and flow cytometry revealed kappa monotypic plasma cells consistent with clonal plasma cell neoplasm and FISH panel was pending at that time.  It was felt she had at least stage II disease based on her beta-2 microglobulin and anemia.  Plan was to treat with Velcade 1.3 mg per metered squared days 1, 4, 8 and 11/Decadron 40 mg on days 1, 8 and 15 initiation of Revlimid with the second cycle at 25 mg daily days 1 through 14.  Plan was to also obtain an MRI of the lumbar spine to rule out lytic lesion at L3.  She was initiated on Zovirax 400 mg p.o. twice daily.  6/25/2019  1st cycle of chemotherapy initiated  7/1/2019 note in chart regarding patient's large co-pay for the Revlimid and no plan at this point to initiate Revlimid and treat only with Velcade and Decadron per Dr. Walker  7/11/2019  MRI lumbar spine shows a pathologic superior endplate compression fracture at L3 without evidence of retropulsed fragment and innumerable enhancing lesions throughout the lumbar spine consistent with history of multiple myeloma.  9/10/2019  Increasing M-spike and kappa lambda ratio  10/1/2019  Initiation of CyBorD     Current Chemo Regime/TX:  Velcade 1.5mg.m2/cyclophosphamide 150mg every 7 days on days 1,8,15 and 22/decadron 40mg days 1,8,15,22       **Zometa 4mg every 3 months  Current Cycle: 8  # of completed cycles:  7     Previous treatment:   Velcade 1.3 mg per metered squared on days 1, 4, 8 and 11 with Decadron 40 mg on days 1, 8 and 15 x 4 cycles     Past Medical History:  "  Diagnosis Date     Arthritis      Depressive disorder      Essential hypertension 10/1/2015     Major depressive disorder, recurrent episode, mild (H) 10/1/2015     Mixed hyperlipidemia 10/1/2015     Multiple myeloma not having achieved remission (H) 6/24/2019     Other specified disorders of bladder 07/09/2012    Irritable Bladder     Seasonal allergies 10/1/2015     Unspecified essential hypertension 03/19/2007     Unspecified sinusitis (chronic) 09/05/2007       Past Surgical History:   Procedure Laterality Date     APPENDECTOMY       BONE MARROW BIOPSY, BONE SPECIMEN, NEEDLE/TROCAR N/A 6/18/2019    Procedure: BONE MARROW BIOPSY;  Surgeon: Maciej Sanz MD;  Location: [facility]     CHOLECYSTECTOMY       COLONOSCOPY  07-    repeat 10 years     COLONOSCOPY N/A 12/30/2016    Procedure: COLONOSCOPY;  Surgeon: Bhaskar Franklin DO;  Location: [facility]     SINUS SURGERY       TUBAL STERILIZATION         Family History   Problem Relation Age of Onset     Breast Cancer Mother 66        Cause of Death     Parkinsonism Father         \"Possible\"     Coronary Artery Disease Father      Pacemaker Father      Thyroid Disease Daughter      Diabetes No family hx of      Hypertension No family hx of      Hyperlipidemia No family hx of      Cerebrovascular Disease No family hx of      Colon Cancer No family hx of      Prostate Cancer No family hx of      Genetic Disorder No family hx of      Asthma No family hx of      Anesthesia Reaction No family hx of        Social History     Socioeconomic History     Marital status:      Spouse name: Not on file     Number of children: Not on file     Years of education: Not on file     Highest education level: Not on file   Occupational History     Occupation: Financial     Comment:  - (FT)   Social Needs     Financial resource strain: Not on file     Food insecurity     Worry: Not on file     Inability: Not on file     Transportation needs     Medical: Not on " file     Non-medical: Not on file   Tobacco Use     Smoking status: Never Smoker     Smokeless tobacco: Never Used     Tobacco comment: Tried to Quit (YES); QUIT in 1971; Passive Exposure (NO)   Substance and Sexual Activity     Alcohol use: Yes     Comment: RARELY     Drug use: No     Sexual activity: Yes     Partners: Male     Birth control/protection: None   Lifestyle     Physical activity     Days per week: Not on file     Minutes per session: Not on file     Stress: Not on file   Relationships     Social connections     Talks on phone: Not on file     Gets together: Not on file     Attends Church service: Not on file     Active member of club or organization: Not on file     Attends meetings of clubs or organizations: Not on file     Relationship status: Not on file     Intimate partner violence     Fear of current or ex partner: Not on file     Emotionally abused: Not on file     Physically abused: Not on file     Forced sexual activity: Not on file   Other Topics Concern      Service Not Asked     Blood Transfusions Not Asked     Caffeine Concern Yes     Comment: Coffee >6 cups daily     Occupational Exposure Not Asked     Hobby Hazards Not Asked     Sleep Concern Not Asked     Stress Concern Not Asked     Weight Concern Not Asked     Special Diet Not Asked     Back Care Not Asked     Exercise Not Asked     Bike Helmet Not Asked     Seat Belt Not Asked     Self-Exams Not Asked     Parent/sibling w/ CABG, MI or angioplasty before 65F 55M? No   Social History Narrative     Not on file       Current Outpatient Medications   Medication     acyclovir (ZOVIRAX) 400 MG tablet     atorvastatin (LIPITOR) 10 MG tablet     Bortezomib (VELCADE IJ)     cyclophosphamide 50 MG PO capsule     dexamethasone (DECADRON) 4 MG tablet     fluticasone (FLONASE) 50 MCG/ACT nasal spray     losartan (COZAAR) 50 MG tablet     sertraline (ZOLOFT) 50 MG tablet     EPINEPHrine (EPIPEN) 0.3 MG/0.3ML injection     LORazepam  "(ATIVAN) 0.5 MG tablet     ondansetron (ZOFRAN) 8 MG tablet     prochlorperazine (COMPAZINE) 10 MG tablet     No current facility-administered medications for this visit.         Allergies   Allergen Reactions     Lisinopril Cough     Phenylephrine Hcl Other (See Comments)     **Entex  HEADACHE (SEVERE)     Phenylpropanolamine Other (See Comments)     **Entex  HEADACHE (SEVERE)     Pseudoephedrine Tannate Other (See Comments)     **Entex  HEADACHE (SEVERE)     Levofloxacin Rash     **Levaquin     Moxifloxacin Hcl [Avelox] Rash       Review Of Systems:  Constitutional:    denies fever, weight changes, chills, and night sweats.  Eyes:    denies blurred or double vision  Ears/Nose/Throat:   denies ear pain, nose problems, difficulty swallowing  Respiratory:   See hPI  Skin:   denies rash, lesions  Cardiovascular:   denies chest pain, edema  Gastrointestinal:   denies abdominal pain, bloating, nausea, vomiting, early satiety; no change in bowel habits or blood in stool-dealing with constipation  Genitourinary:   denies difficulty with urination, blood in urine  Musculoskeletal:    denies new muscle pain, bone pain  Neurologic:   denies lightheadedness, headaches, numbness or tingling  Psychiatric:   denies anxiety, depression  Hematologic/Lymphatic/Immunologic:   denies easy bruising, easy bleeding, lumps or bumps noted  Endocrine:   Denies increased thirst    ECOG Performance Status: 1    Physical Exam:  /84   Pulse 80   Temp 96.7  F (35.9  C) (Tympanic)   Resp 18   Ht 1.6 m (5' 3\")   Wt 74.3 kg (163 lb 12.8 oz)   SpO2 98%   BMI 29.02 kg/m      GENERAL APPEARANCE: Healthy, alert and in no acute distress.  HEENT: Normocephalic, Sclerae anicteric. Oropharynx without ulcers, lesions, or thrush.  NECK:   No asymmetry or masses, no thyromegaly.  LYMPHATICS: No palpable cervical, supraclavicular, axillary, or inguinal nodes   RESP: Lungs clear to auscultation bilaterally, respirations regular and " easy  CARDIOVASCULAR: Regular rate and rhythm. Normal S1, S2; no murmur, gallop, or rub.  ABDOMEN: Soft, nontender. Bowel sounds auscultated all 4 quadrants. No palpable organomegaly or masses.  MUSCULOSKELETAL: Extremities without gross deformities noted. No edema of bilateral lower extremities.  NEURO: Alert and oriented x 3.  Gait steady.  PSYCHIATRIC: Mentation and affect appear normal.  Mood appropriate.    Laboratory:  Results for orders placed or performed in visit on 04/16/20   Comprehensive metabolic panel     Status: Abnormal   Result Value Ref Range    Sodium 136 133 - 144 mmol/L    Potassium 3.8 3.4 - 5.3 mmol/L    Chloride 105 94 - 109 mmol/L    Carbon Dioxide 29 20 - 32 mmol/L    Anion Gap 2 (L) 3 - 14 mmol/L    Glucose 111 (H) 70 - 99 mg/dL    Urea Nitrogen 15 7 - 30 mg/dL    Creatinine 0.75 0.52 - 1.04 mg/dL    GFR Estimate 80 >60 mL/min/[1.73_m2]    GFR Estimate If Black >90 >60 mL/min/[1.73_m2]    Calcium 8.7 8.5 - 10.1 mg/dL    Bilirubin Total 0.4 0.2 - 1.3 mg/dL    Albumin 3.3 (L) 3.4 - 5.0 g/dL    Protein Total 8.4 6.8 - 8.8 g/dL    Alkaline Phosphatase 108 40 - 150 U/L    ALT 25 0 - 50 U/L    AST 21 0 - 45 U/L   CBC with platelets differential     Status: Abnormal   Result Value Ref Range    WBC 4.8 4.0 - 11.0 10e9/L    RBC Count 3.71 (L) 3.8 - 5.2 10e12/L    Hemoglobin 11.8 11.7 - 15.7 g/dL    Hematocrit 34.9 (L) 35.0 - 47.0 %    MCV 94 78 - 100 fl    MCH 31.8 26.5 - 33.0 pg    MCHC 33.8 31.5 - 36.5 g/dL    RDW 14.6 10.0 - 15.0 %    Platelet Count 188 150 - 450 10e9/L    Diff Method Automated Method     % Neutrophils 53.5 %    % Lymphocytes 26.0 %    % Monocytes 16.5 %    % Eosinophils 1.9 %    % Basophils 0.6 %    % Immature Granulocytes 1.5 %    Nucleated RBCs 0 0 /100    Absolute Neutrophil 2.6 1.6 - 8.3 10e9/L    Absolute Lymphocytes 1.3 0.8 - 5.3 10e9/L    Absolute Monocytes 0.8 0.0 - 1.3 10e9/L    Absolute Eosinophils 0.1 0.0 - 0.7 10e9/L    Absolute Basophils 0.0 0.0 - 0.2 10e9/L    Abs  Immature Granulocytes 0.1 0 - 0.4 10e9/L    Absolute Nucleated RBC 0.0      Results for WILLIAM WILDER (MRN [id number]) as of 4/17/2020 16:22   Ref. Range 4/9/2020 14:38   Beta-2-Microglobulin Latest Ref Range: <2.3 mg/L 3.1 (H)     Results for WILLIAM WILDER (MRN [id number]) as of 4/17/2020 16:22   Ref. Range 4/9/2020 14:38   Albumin Fraction Latest Ref Range: 3.7 - 5.1 g/dL 4.2   Alpha 1 Fraction Latest Ref Range: 0.2 - 0.4 g/dL 0.3   Alpha 2 Fraction Latest Ref Range: 0.5 - 0.9 g/dL 0.7   Beta Fraction Latest Ref Range: 0.6 - 1.0 g/dL 0.6   ELP Interpretation: Unknown Large monoclonal ...   Gamma Fraction Latest Ref Range: 0.7 - 1.6 g/dL 2.6 (H)   IGA Latest Ref Range: 84 - 499 mg/dL 13 (L)   IGG Latest Ref Range: 610 - 1,616 mg/dL 2,901 (H)   IGM Latest Ref Range: 35 - 242 mg/dL 11 (L)   Immunofixation ELP Unknown (Note)   Kappa Free Lt Chain Latest Ref Range: 0.33 - 1.94 mg/dL 0.72   Kappa Lambda Ratio Latest Ref Range: 0.26 - 1.65  2.67 (H)   Lambda Free Lt Chain Latest Ref Range: 0.57 - 2.63 mg/dL 0.27 (L)   Monoclonal Peak Latest Ref Range: 0.0 g/dL 2.3 (H)   Viscosity Index Latest Ref Range: 1.4 - 1.8  1.7     Imaging Studies:  None completed for today's visit      ASSESSMENT/PLAN:    #1 Multiple myeloma:   IgG kappa multiple myeloma with 80% involvement of the bone marrow diagnosed June 2019 initially treated with Velcade and Decadron and received 4 cycles with increasing M-spike and kappa/lambda ratio.  Treatment changed to CyBorD on 10/1/2019 with elevating LFT's felt to be related to the Cytoxan so this was decreased with cycle 3 day 8.  M-spike down a bit more.  Will continue the same and she will initiate cycle 8 today and follow-up prior to cycle 9-day 1 with myeloma labs.      I encouraged patient to call with any questions or concerns.       Barbie Quintana, NP  APRN, FNP-BC, AOCNP

## 2020-04-16 NOTE — PROGRESS NOTES
Patient is a 69 year old female here today for injection of Velcade per order of . Patient meets parameters for today's infusion. Patient identified with two identifiers, order verified, and verbal consent for today's infusion obtained from patient. Written consent for treatment is on file and valid.    Today's lab values: WBC: 4.8, HGB: 11.8, PLT: 188  ANC: 2.6.     Patient meets order parameters for today's treatment.  Patient denies questions or concerns regarding injection and/or medication(s) being administered.    1500 Velcade injected SQ into right upper outer arm at 45 degree angle  per protocol rotating sites.     Injection administered per protocol. Patient tolerated infusion without incident, no signs or symptoms of adverse reaction noted. Patient denies pain or discomfort.     Covered with a sterile bandage. Pt instructed to leave bandage intact for a minimum of one hour, and to call with questions or concerns. Patient declines copy of appointments, discharge instructions, and after visit summary (AVS). Patient states understanding, discharged ambulatory.

## 2020-04-16 NOTE — PATIENT INSTRUCTIONS
We would like to see you back per your schedule.     When you are in need of a refill, please call your pharmacy and they will send us a request.     If you have any questions please call [phone]    Other instructions:  none

## 2020-04-16 NOTE — NURSING NOTE
"Chief Complaint   Patient presents with     RECHECK     Follow up Multiple Myeloma       Initial /84   Pulse 80   Temp 96.7  F (35.9  C) (Tympanic)   Resp 18   Ht 1.6 m (5' 3\")   Wt 74.3 kg (163 lb 12.8 oz)   SpO2 98%   BMI 29.02 kg/m   Estimated body mass index is 29.02 kg/m  as calculated from the following:    Height as of this encounter: 1.6 m (5' 3\").    Weight as of this encounter: 74.3 kg (163 lb 12.8 oz).  Medication Reconciliation: complete.  Immunizations and advance directives status reviewed. Pain scale =0 , PHQ-2=0.            Vilma Chi LPN    "

## 2020-04-17 ENCOUNTER — TELEPHONE (OUTPATIENT)
Dept: FAMILY MEDICINE | Facility: OTHER | Age: 70
End: 2020-04-17

## 2020-04-17 NOTE — TELEPHONE ENCOUNTER
9:59 AM    Reason for Call: Phone Call    Description: pt would like to know what [facility] would require for lab work that would be different from what Chemo dept orders/pt is coming in on May 14th    Was an appointment offered for this call? No  If yes : Appointment type              Date    Preferred method for responding to this message: Telephone Call  What is your phone number ?[phone]/    If we cannot reach you directly, may we leave a detailed response at the number you provided? Yes    Can this message wait until your PCP/provider returns, if available today? Not applicable    Natalia Degroot

## 2020-04-23 ENCOUNTER — INFUSION THERAPY VISIT (OUTPATIENT)
Dept: INFUSION THERAPY | Facility: OTHER | Age: 70
End: 2020-04-23
Attending: INTERNAL MEDICINE
Payer: MEDICARE

## 2020-04-23 VITALS
HEIGHT: 63 IN | HEART RATE: 88 BPM | SYSTOLIC BLOOD PRESSURE: 109 MMHG | OXYGEN SATURATION: 97 % | BODY MASS INDEX: 29.02 KG/M2 | DIASTOLIC BLOOD PRESSURE: 70 MMHG | TEMPERATURE: 97 F | WEIGHT: 163.8 LBS | RESPIRATION RATE: 17 BRPM

## 2020-04-23 DIAGNOSIS — C90.00 MULTIPLE MYELOMA NOT HAVING ACHIEVED REMISSION (H): Primary | ICD-10-CM

## 2020-04-23 LAB
BASOPHILS # BLD AUTO: 0 10E9/L (ref 0–0.2)
BASOPHILS NFR BLD AUTO: 0.4 %
DIFFERENTIAL METHOD BLD: ABNORMAL
EOSINOPHIL # BLD AUTO: 0.2 10E9/L (ref 0–0.7)
EOSINOPHIL NFR BLD AUTO: 2.9 %
ERYTHROCYTE [DISTWIDTH] IN BLOOD BY AUTOMATED COUNT: 14.6 % (ref 10–15)
HCT VFR BLD AUTO: 34.8 % (ref 35–47)
HGB BLD-MCNC: 11.8 G/DL (ref 11.7–15.7)
IMM GRANULOCYTES # BLD: 0.1 10E9/L (ref 0–0.4)
IMM GRANULOCYTES NFR BLD: 1.4 %
LYMPHOCYTES # BLD AUTO: 1.4 10E9/L (ref 0.8–5.3)
LYMPHOCYTES NFR BLD AUTO: 24.6 %
MCH RBC QN AUTO: 31.8 PG (ref 26.5–33)
MCHC RBC AUTO-ENTMCNC: 33.9 G/DL (ref 31.5–36.5)
MCV RBC AUTO: 94 FL (ref 78–100)
MONOCYTES # BLD AUTO: 0.9 10E9/L (ref 0–1.3)
MONOCYTES NFR BLD AUTO: 15.3 %
NEUTROPHILS # BLD AUTO: 3.1 10E9/L (ref 1.6–8.3)
NEUTROPHILS NFR BLD AUTO: 55.4 %
NRBC # BLD AUTO: 0 10*3/UL
NRBC BLD AUTO-RTO: 0 /100
PLATELET # BLD AUTO: 192 10E9/L (ref 150–450)
RBC # BLD AUTO: 3.71 10E12/L (ref 3.8–5.2)
WBC # BLD AUTO: 5.6 10E9/L (ref 4–11)

## 2020-04-23 PROCEDURE — 25000128 H RX IP 250 OP 636: Performed by: NURSE PRACTITIONER

## 2020-04-23 PROCEDURE — 85025 COMPLETE CBC W/AUTO DIFF WBC: CPT | Mod: ZL | Performed by: NURSE PRACTITIONER

## 2020-04-23 PROCEDURE — 36415 COLL VENOUS BLD VENIPUNCTURE: CPT | Mod: ZL | Performed by: NURSE PRACTITIONER

## 2020-04-23 PROCEDURE — 96401 CHEMO ANTI-NEOPL SQ/IM: CPT

## 2020-04-23 RX ORDER — ONDANSETRON 4 MG/1
8 TABLET, FILM COATED ORAL ONCE
Status: COMPLETED | OUTPATIENT
Start: 2020-04-23 | End: 2020-04-23

## 2020-04-23 RX ADMIN — BORTEZOMIB 2.6 MG: 3.5 INJECTION, POWDER, LYOPHILIZED, FOR SOLUTION INTRAVENOUS; SUBCUTANEOUS at 14:32

## 2020-04-23 RX ADMIN — ONDANSETRON HYDROCHLORIDE 8 MG: 4 TABLET, FILM COATED ORAL at 14:31

## 2020-04-23 ASSESSMENT — PAIN SCALES - GENERAL: PAINLEVEL: MILD PAIN (2)

## 2020-04-23 ASSESSMENT — MIFFLIN-ST. JEOR: SCORE: 1227

## 2020-04-23 NOTE — PROGRESS NOTES
Patient is a 69 year old female here today for injection of Velcade per order of . Patient meets parameters for today's infusion. Patient identified with two identifiers, order verified, and verbal consent for today's infusion obtained from patient. Written consent for treatment is on file and valid.    Recent lab values: WBC: 5.6, HGB: 11.8, PLT: 192  ANC: 3.1. Patient meets order parameters for today's treatment.  Patient denies questions or concerns regarding injection and/or medication(s) being administered.  Velcade injected SQ into left arm per protocol rotating sites.     Injection administered per protocol. Patient tolerated infusion without incident, no signs or symptoms of adverse reaction noted. Patient denies pain or discomfort.     Covered with a sterile bandage. Pt instructed to leave bandage intact for a minimum of one hour, and to call with questions or concerns. Copy of appointments, discharge instructions, and after visit summary (AVS) provided to patient. Patient states understanding, discharged ambulatory.

## 2020-04-30 ENCOUNTER — INFUSION THERAPY VISIT (OUTPATIENT)
Dept: INFUSION THERAPY | Facility: OTHER | Age: 70
End: 2020-04-30
Attending: INTERNAL MEDICINE
Payer: MEDICARE

## 2020-04-30 VITALS — BODY MASS INDEX: 28.83 KG/M2 | HEIGHT: 63 IN | WEIGHT: 162.7 LBS

## 2020-04-30 DIAGNOSIS — C90.00 MULTIPLE MYELOMA NOT HAVING ACHIEVED REMISSION (H): Primary | ICD-10-CM

## 2020-04-30 LAB
ALBUMIN SERPL-MCNC: 3.4 G/DL (ref 3.4–5)
ALP SERPL-CCNC: 103 U/L (ref 40–150)
ALT SERPL W P-5'-P-CCNC: 23 U/L (ref 0–50)
ANION GAP SERPL CALCULATED.3IONS-SCNC: 4 MMOL/L (ref 3–14)
AST SERPL W P-5'-P-CCNC: 21 U/L (ref 0–45)
BASOPHILS # BLD AUTO: 0 10E9/L (ref 0–0.2)
BASOPHILS NFR BLD AUTO: 0.2 %
BILIRUB SERPL-MCNC: 0.4 MG/DL (ref 0.2–1.3)
BUN SERPL-MCNC: 15 MG/DL (ref 7–30)
CALCIUM SERPL-MCNC: 8.9 MG/DL (ref 8.5–10.1)
CHLORIDE SERPL-SCNC: 106 MMOL/L (ref 94–109)
CO2 SERPL-SCNC: 26 MMOL/L (ref 20–32)
CREAT SERPL-MCNC: 0.61 MG/DL (ref 0.52–1.04)
DIFFERENTIAL METHOD BLD: ABNORMAL
EOSINOPHIL # BLD AUTO: 0.1 10E9/L (ref 0–0.7)
EOSINOPHIL NFR BLD AUTO: 2.3 %
ERYTHROCYTE [DISTWIDTH] IN BLOOD BY AUTOMATED COUNT: 14.5 % (ref 10–15)
GFR SERPL CREATININE-BSD FRML MDRD: >90 ML/MIN/{1.73_M2}
GLUCOSE SERPL-MCNC: 100 MG/DL (ref 70–99)
HCT VFR BLD AUTO: 34.3 % (ref 35–47)
HGB BLD-MCNC: 11.8 G/DL (ref 11.7–15.7)
IMM GRANULOCYTES # BLD: 0.1 10E9/L (ref 0–0.4)
IMM GRANULOCYTES NFR BLD: 1.1 %
LYMPHOCYTES # BLD AUTO: 1.3 10E9/L (ref 0.8–5.3)
LYMPHOCYTES NFR BLD AUTO: 26.9 %
MCH RBC QN AUTO: 31.8 PG (ref 26.5–33)
MCHC RBC AUTO-ENTMCNC: 34.4 G/DL (ref 31.5–36.5)
MCV RBC AUTO: 93 FL (ref 78–100)
MONOCYTES # BLD AUTO: 0.8 10E9/L (ref 0–1.3)
MONOCYTES NFR BLD AUTO: 17.9 %
NEUTROPHILS # BLD AUTO: 2.4 10E9/L (ref 1.6–8.3)
NEUTROPHILS NFR BLD AUTO: 51.6 %
NRBC # BLD AUTO: 0 10*3/UL
NRBC BLD AUTO-RTO: 0 /100
PLATELET # BLD AUTO: 211 10E9/L (ref 150–450)
POTASSIUM SERPL-SCNC: 3.8 MMOL/L (ref 3.4–5.3)
PROT SERPL-MCNC: 8.6 G/DL (ref 6.8–8.8)
RBC # BLD AUTO: 3.71 10E12/L (ref 3.8–5.2)
SODIUM SERPL-SCNC: 136 MMOL/L (ref 133–144)
WBC # BLD AUTO: 4.7 10E9/L (ref 4–11)

## 2020-04-30 PROCEDURE — 80053 COMPREHEN METABOLIC PANEL: CPT | Mod: ZL | Performed by: INTERNAL MEDICINE

## 2020-04-30 PROCEDURE — 25800030 ZZH RX IP 258 OP 636: Performed by: NURSE PRACTITIONER

## 2020-04-30 PROCEDURE — 96401 CHEMO ANTI-NEOPL SQ/IM: CPT

## 2020-04-30 PROCEDURE — 36415 COLL VENOUS BLD VENIPUNCTURE: CPT | Mod: ZL | Performed by: NURSE PRACTITIONER

## 2020-04-30 PROCEDURE — 96365 THER/PROPH/DIAG IV INF INIT: CPT

## 2020-04-30 PROCEDURE — 25000128 H RX IP 250 OP 636: Mod: JW | Performed by: NURSE PRACTITIONER

## 2020-04-30 PROCEDURE — 85025 COMPLETE CBC W/AUTO DIFF WBC: CPT | Mod: ZL | Performed by: NURSE PRACTITIONER

## 2020-04-30 RX ORDER — ZOLEDRONIC ACID 0.04 MG/ML
4 INJECTION, SOLUTION INTRAVENOUS ONCE
Status: COMPLETED | OUTPATIENT
Start: 2020-04-30 | End: 2020-04-30

## 2020-04-30 RX ORDER — ONDANSETRON 4 MG/1
8 TABLET, FILM COATED ORAL ONCE
Status: COMPLETED | OUTPATIENT
Start: 2020-04-30 | End: 2020-04-30

## 2020-04-30 RX ORDER — ZOLEDRONIC ACID 0.04 MG/ML
4 INJECTION, SOLUTION INTRAVENOUS ONCE
Status: CANCELLED | OUTPATIENT
Start: 2020-04-30

## 2020-04-30 RX ORDER — ZOLEDRONIC ACID 0.04 MG/ML
4 INJECTION, SOLUTION INTRAVENOUS ONCE
Status: CANCELLED | OUTPATIENT
Start: 2020-10-16

## 2020-04-30 RX ADMIN — ONDANSETRON HYDROCHLORIDE 8 MG: 4 TABLET, FILM COATED ORAL at 15:12

## 2020-04-30 RX ADMIN — ZOLEDRONIC ACID 4 MG: 0.04 INJECTION, SOLUTION INTRAVENOUS at 14:42

## 2020-04-30 RX ADMIN — SODIUM CHLORIDE 250 ML: 9 INJECTION, SOLUTION INTRAVENOUS at 14:41

## 2020-04-30 RX ADMIN — BORTEZOMIB 2.6 MG: 3.5 INJECTION, POWDER, LYOPHILIZED, FOR SOLUTION INTRAVENOUS; SUBCUTANEOUS at 15:13

## 2020-04-30 ASSESSMENT — MIFFLIN-ST. JEOR: SCORE: 1222.13

## 2020-04-30 NOTE — PROGRESS NOTES
Patient is a 69 year old female here accompanied by  today for infusion of Zometa per order of Dr. Walker.  Patient identified with two identifiers, order verified, and verbal consent for today's infusion obtained from patient.      Today's lab values:  Calcium: 8.9, Creatinine: 0.61.      Patient meets order parameters for today's treatment.     22 gauge angio cath inserted into right antecubital.  Immediate blood return noted.  IV secured with sterile, transparent dressing and tape.  Patient tolerated well, denies pain or discomfort at this time.  Flushes easily without resistance, no signs or symptoms of infiltration or infection.   Patient denies questions or concerns regarding infusion and/or medication(s) being administered.    1442: IV pump verified with dose, drug, and rate of administration.  Infusion administered per protocol.  Patient tolerated infusion well, no signs or symptoms of adverse reaction noted.  Patient denies pain nor discomfort.     IV removed, catheter intact.  Site clean, dry and intact.  No signs or symptoms of infiltration or infection.  Covered with a sterile bandage, slight pressure applied for 30 seconds.  Pt instructed to leave bandage intact for a minimum of one hour, and to call with questions or concerns.  Copy of appointments, discharge instructions, and after visit summary (AVS) provided to patient.  Patient states understanding, discharged.

## 2020-04-30 NOTE — PROGRESS NOTES
Patient is a 69 year old female here today for injection of Velcade per order of . Patient meets parameters for today's infusion. Patient identified with two identifiers, order verified, and verbal consent for today's infusion obtained from patient. Written consent for treatment is on file and valid.    Recent lab values: WBC: 4.7, HGB: 11.8, PLT: 211  ANC: 2.4. Patient meets order parameters for today's treatment.  Patient denies questions or concerns regarding injection and/or medication(s) being administered.  Velcade injected SQ into right upper outer arm  per protocol rotating sites.     Injection administered per protocol. Patient tolerated infusion without incident, no signs or symptoms of adverse reaction noted. Patient denies pain or discomfort.     Covered with a sterile bandage. Pt instructed to leave bandage intact for a minimum of one hour, and to call with questions or concerns. Copy of appointments, discharge instructions, and after visit summary (AVS) provided to patient. Patient states understanding, discharged ambulatory.

## 2020-05-07 ENCOUNTER — INFUSION THERAPY VISIT (OUTPATIENT)
Dept: INFUSION THERAPY | Facility: OTHER | Age: 70
End: 2020-05-07
Attending: INTERNAL MEDICINE
Payer: MEDICARE

## 2020-05-07 VITALS
TEMPERATURE: 97 F | SYSTOLIC BLOOD PRESSURE: 119 MMHG | HEART RATE: 71 BPM | HEIGHT: 63 IN | RESPIRATION RATE: 16 BRPM | OXYGEN SATURATION: 97 % | BODY MASS INDEX: 28.95 KG/M2 | DIASTOLIC BLOOD PRESSURE: 79 MMHG | WEIGHT: 163.36 LBS

## 2020-05-07 DIAGNOSIS — C90.00 MULTIPLE MYELOMA NOT HAVING ACHIEVED REMISSION (H): Primary | ICD-10-CM

## 2020-05-07 LAB
BASOPHILS # BLD AUTO: 0 10E9/L (ref 0–0.2)
BASOPHILS NFR BLD AUTO: 0.2 %
DIFFERENTIAL METHOD BLD: ABNORMAL
EOSINOPHIL # BLD AUTO: 0.2 10E9/L (ref 0–0.7)
EOSINOPHIL NFR BLD AUTO: 3.1 %
ERYTHROCYTE [DISTWIDTH] IN BLOOD BY AUTOMATED COUNT: 14.3 % (ref 10–15)
HCT VFR BLD AUTO: 34.1 % (ref 35–47)
HGB BLD-MCNC: 11.7 G/DL (ref 11.7–15.7)
IMM GRANULOCYTES # BLD: 0.1 10E9/L (ref 0–0.4)
IMM GRANULOCYTES NFR BLD: 1.5 %
LYMPHOCYTES # BLD AUTO: 1.4 10E9/L (ref 0.8–5.3)
LYMPHOCYTES NFR BLD AUTO: 25.3 %
MCH RBC QN AUTO: 32 PG (ref 26.5–33)
MCHC RBC AUTO-ENTMCNC: 34.3 G/DL (ref 31.5–36.5)
MCV RBC AUTO: 93 FL (ref 78–100)
MONOCYTES # BLD AUTO: 0.8 10E9/L (ref 0–1.3)
MONOCYTES NFR BLD AUTO: 14.4 %
NEUTROPHILS # BLD AUTO: 3.1 10E9/L (ref 1.6–8.3)
NEUTROPHILS NFR BLD AUTO: 55.5 %
NRBC # BLD AUTO: 0 10*3/UL
NRBC BLD AUTO-RTO: 0 /100
PLATELET # BLD AUTO: 196 10E9/L (ref 150–450)
RBC # BLD AUTO: 3.66 10E12/L (ref 3.8–5.2)
WBC # BLD AUTO: 5.5 10E9/L (ref 4–11)

## 2020-05-07 PROCEDURE — 96401 CHEMO ANTI-NEOPL SQ/IM: CPT

## 2020-05-07 PROCEDURE — 83883 ASSAY NEPHELOMETRY NOT SPEC: CPT | Mod: ZL | Performed by: NURSE PRACTITIONER

## 2020-05-07 PROCEDURE — 82232 ASSAY OF BETA-2 PROTEIN: CPT | Mod: ZL | Performed by: NURSE PRACTITIONER

## 2020-05-07 PROCEDURE — 36415 COLL VENOUS BLD VENIPUNCTURE: CPT | Mod: ZL | Performed by: NURSE PRACTITIONER

## 2020-05-07 PROCEDURE — 82784 ASSAY IGA/IGD/IGG/IGM EACH: CPT | Mod: ZL | Performed by: NURSE PRACTITIONER

## 2020-05-07 PROCEDURE — 00000402 ZZHCL STATISTIC TOTAL PROTEIN: Mod: ZL | Performed by: NURSE PRACTITIONER

## 2020-05-07 PROCEDURE — 84165 PROTEIN E-PHORESIS SERUM: CPT | Mod: ZL | Performed by: NURSE PRACTITIONER

## 2020-05-07 PROCEDURE — 25000128 H RX IP 250 OP 636: Mod: JW | Performed by: NURSE PRACTITIONER

## 2020-05-07 PROCEDURE — 85810 BLOOD VISCOSITY EXAMINATION: CPT | Mod: ZL | Performed by: NURSE PRACTITIONER

## 2020-05-07 PROCEDURE — 85025 COMPLETE CBC W/AUTO DIFF WBC: CPT | Mod: ZL | Performed by: NURSE PRACTITIONER

## 2020-05-07 RX ORDER — ONDANSETRON 4 MG/1
8 TABLET, FILM COATED ORAL ONCE
Status: COMPLETED | OUTPATIENT
Start: 2020-05-07 | End: 2020-05-07

## 2020-05-07 RX ADMIN — ONDANSETRON HYDROCHLORIDE 8 MG: 4 TABLET, FILM COATED ORAL at 14:42

## 2020-05-07 RX ADMIN — BORTEZOMIB 2.7 MG: 3.5 INJECTION, POWDER, LYOPHILIZED, FOR SOLUTION INTRAVENOUS; SUBCUTANEOUS at 15:16

## 2020-05-07 ASSESSMENT — MIFFLIN-ST. JEOR
SCORE: 1225
SCORE: 1225

## 2020-05-07 NOTE — PROGRESS NOTES
Patient is a 69 year old female here today for injection of Velcade per order of . Patient meets parameters for today's infusion. Patient identified with two identifiers, order verified, and verbal consent for today's infusion obtained from patient.    Independent dose check with Yancy Quintanilla RN prior to release of drug.    Today's lab values: WBC: 5.5, HGB: 11.7, PLT: 196  ANC: 3.1.     Patient meets order parameters for today's treatment.    Patient denies questions or concerns regarding injection and/or medication(s) being administered.    1516 Velcade injected SQ into left upper outer arm at 45 degree  per protocol rotating sites.     Injection administered per protocol. Patient tolerated infusion without incident, no signs or symptoms of adverse reaction noted. Patient denies pain or discomfort.     Covered with a sterile bandage. Pt instructed to leave bandage intact for a minimum of one hour, and to call with questions or concerns. Patient declines copy of appointments, discharge instructions, and after visit summary (AVS). Patient states understanding, discharged ambulatory.

## 2020-05-07 NOTE — PATIENT INSTRUCTIONS
We will see you back as planned. If you have any questions or concerns, we can be reached Monday through Friday 8am - 430pm at [phone] ([facility] [place]). If you have concerns related to a potential reaction/side effect after hours/weekends/holiday's, please seek emergent medical care.

## 2020-05-11 LAB
ALBUMIN SERPL ELPH-MCNC: 4.1 G/DL (ref 3.7–5.1)
ALPHA1 GLOB SERPL ELPH-MCNC: 0.3 G/DL (ref 0.2–0.4)
ALPHA2 GLOB SERPL ELPH-MCNC: 0.8 G/DL (ref 0.5–0.9)
B-GLOBULIN SERPL ELPH-MCNC: 0.6 G/DL (ref 0.6–1)
GAMMA GLOB SERPL ELPH-MCNC: 2.4 G/DL (ref 0.7–1.6)
IGA SERPL-MCNC: 13 MG/DL (ref 84–499)
IGG SERPL-MCNC: 2719 MG/DL (ref 610–1616)
IGM SERPL-MCNC: 14 MG/DL (ref 35–242)
KAPPA LC UR-MCNC: 0.81 MG/DL (ref 0.33–1.94)
KAPPA LC/LAMBDA SER: 3 {RATIO} (ref 0.26–1.65)
LAMBDA LC SERPL-MCNC: 0.27 MG/DL (ref 0.57–2.63)
M PROTEIN SERPL ELPH-MCNC: 2 G/DL
PROT PATTERN SERPL ELPH-IMP: ABNORMAL
VISC SER: 1.9 CP (ref 1.4–1.8)

## 2020-05-11 NOTE — PROGRESS NOTES
Oncology Follow-up Visit:  May 14, 2020    Reason for Visit:  Patient presents with:  RECHECK: Follow up Multiple Myeloma     Nursing Note and documentation reviewed: yes    HPI: This is a 69-year-old female patient who presents to the oncology clinic today for evaluation prior to receiving cycle 9 chemotherapy for Stage II-RISS IgG multiple myeloma diagnosed June 2019.  She progressed on Velcade and dexamethasone and is now receiving CyBorD with decreased dose of cytoxan related to LFT elevation.      She presents to the clinic today stating she is doing well.  She does admit to some mild fatigue.  She does continue to get some palpitations on the day of her injection but denies any issues with chest pain or shortness of breath or dizziness.  She does also get some heartburn and is using Tums that work well.  She essentially has no new complaints today.    Oncologic History:     12/31/2018   patient presented to the emergency room with vertigo and fatigue.  CT scan of the head was negative and subsequent stress test was negative.  5/3/2019  She was seen by her PCP who ordered lab work and noted a total protein of 12.9.  SPEP at that time showed an M spike of 6.2 with a large monoclonal protein seen in the gamma fraction.  Urine immunofixation showed a possible small protein band in the gamma fraction  5/31/2019  she was evaluated by Dr. Walker with Medical Oncology with plan to rule out myeloma and obtain bone marrow aspiration biopsy as well as a metastatic bone survey along with additional labwork  6/18/2019 she underwent bone marrow aspiration and biopsy  6/24/2019  She was seen again by Dr. Walker and CBC showed a hemoglobin of 9.3, M spike 7.3 with monoclonal IgG immunoglobulin of kappa light chain type; serum viscosity was 2.9; quantitative immunoglobulins showed an IgG of 8160, beta-2 microglobulin was 5.8, BUN was 21 with creatinine is 0.8 and total protein was 13.7.  Quantitative kappa/lambda free  light chains showed an elevated ratio of 17.0 bone marrow aspiration biopsy showed plasma cell myeloma with approximately 80% plasma cells.  Immunofixation showed IgG kappa and flow cytometry revealed kappa monotypic plasma cells consistent with clonal plasma cell neoplasm and FISH panel was pending at that time.  It was felt she had at least stage II disease based on her beta-2 microglobulin and anemia.  Plan was to treat with Velcade 1.3 mg per metered squared days 1, 4, 8 and 11/Decadron 40 mg on days 1, 8 and 15 initiation of Revlimid with the second cycle at 25 mg daily days 1 through 14.  Plan was to also obtain an MRI of the lumbar spine to rule out lytic lesion at L3.  She was initiated on Zovirax 400 mg p.o. twice daily.  6/25/2019  1st cycle of chemotherapy initiated  7/1/2019 note in chart regarding patient's large co-pay for the Revlimid and no plan at this point to initiate Revlimid and treat only with Velcade and Decadron per Dr. Walker  7/11/2019  MRI lumbar spine shows a pathologic superior endplate compression fracture at L3 without evidence of retropulsed fragment and innumerable enhancing lesions throughout the lumbar spine consistent with history of multiple myeloma.  9/10/2019  Increasing M-spike and kappa lambda ratio  10/1/2019  Initiation of CyBorD     Current Chemo Regime/TX:  Velcade 1.5mg.m2/cyclophosphamide 150mg every 7 days on days 1,8,15 and 22/decadron 40mg days 1,8,15,22       **Zometa 4mg every 3 months  Current Cycle: 9  # of completed cycles:  8     Previous treatment:   Velcade 1.3 mg/m2 days 1, 4, 8 and 11 with Decadron 40 mg days 1, 8 and 15 x 4 cycles     Past Medical History:   Diagnosis Date     Arthritis      Depressive disorder      Essential hypertension 10/1/2015     Major depressive disorder, recurrent episode, mild (H) 10/1/2015     Mixed hyperlipidemia 10/1/2015     Multiple myeloma not having achieved remission (H) 6/24/2019     Other specified disorders of bladder  "07/09/2012    Irritable Bladder     Seasonal allergies 10/1/2015     Unspecified essential hypertension 03/19/2007     Unspecified sinusitis (chronic) 09/05/2007       Past Surgical History:   Procedure Laterality Date     APPENDECTOMY       BONE MARROW BIOPSY, BONE SPECIMEN, NEEDLE/TROCAR N/A 6/18/2019    Procedure: BONE MARROW BIOPSY;  Surgeon: Maciej Sanz MD;  Location: [facility]     CHOLECYSTECTOMY       COLONOSCOPY  07-    repeat 10 years     COLONOSCOPY N/A 12/30/2016    Procedure: COLONOSCOPY;  Surgeon: Bhaskar Franklin DO;  Location: [facility]     SINUS SURGERY       TUBAL STERILIZATION         Family History   Problem Relation Age of Onset     Breast Cancer Mother 66        Cause of Death     Parkinsonism Father         \"Possible\"     Coronary Artery Disease Father      Pacemaker Father      Thyroid Disease Daughter      Diabetes No family hx of      Hypertension No family hx of      Hyperlipidemia No family hx of      Cerebrovascular Disease No family hx of      Colon Cancer No family hx of      Prostate Cancer No family hx of      Genetic Disorder No family hx of      Asthma No family hx of      Anesthesia Reaction No family hx of        Social History     Socioeconomic History     Marital status:      Spouse name: Not on file     Number of children: Not on file     Years of education: Not on file     Highest education level: Not on file   Occupational History     Occupation: Financial     Comment:  - (FT)   Social Needs     Financial resource strain: Not on file     Food insecurity     Worry: Not on file     Inability: Not on file     Transportation needs     Medical: Not on file     Non-medical: Not on file   Tobacco Use     Smoking status: Never Smoker     Smokeless tobacco: Never Used     Tobacco comment: Tried to Quit (YES); QUIT in 1971; Passive Exposure (NO)   Substance and Sexual Activity     Alcohol use: Yes     Comment: RARELY     Drug use: No     Sexual activity: " Yes     Partners: Male     Birth control/protection: None   Lifestyle     Physical activity     Days per week: Not on file     Minutes per session: Not on file     Stress: Not on file   Relationships     Social connections     Talks on phone: Not on file     Gets together: Not on file     Attends Anabaptist service: Not on file     Active member of club or organization: Not on file     Attends meetings of clubs or organizations: Not on file     Relationship status: Not on file     Intimate partner violence     Fear of current or ex partner: Not on file     Emotionally abused: Not on file     Physically abused: Not on file     Forced sexual activity: Not on file   Other Topics Concern      Service Not Asked     Blood Transfusions Not Asked     Caffeine Concern Yes     Comment: Coffee >6 cups daily     Occupational Exposure Not Asked     Hobby Hazards Not Asked     Sleep Concern Not Asked     Stress Concern Not Asked     Weight Concern Not Asked     Special Diet Not Asked     Back Care Not Asked     Exercise Not Asked     Bike Helmet Not Asked     Seat Belt Not Asked     Self-Exams Not Asked     Parent/sibling w/ CABG, MI or angioplasty before 65F 55M? No   Social History Narrative     Not on file       Current Outpatient Medications   Medication     acyclovir (ZOVIRAX) 400 MG tablet     atorvastatin (LIPITOR) 10 MG tablet     Bortezomib (VELCADE IJ)     cyclophosphamide 50 MG PO capsule     dexamethasone (DECADRON) 4 MG tablet     fluticasone (FLONASE) 50 MCG/ACT nasal spray     losartan (COZAAR) 50 MG tablet     sertraline (ZOLOFT) 50 MG tablet     EPINEPHrine (EPIPEN) 0.3 MG/0.3ML injection     LORazepam (ATIVAN) 0.5 MG tablet     ondansetron (ZOFRAN) 8 MG tablet     prochlorperazine (COMPAZINE) 10 MG tablet     No current facility-administered medications for this visit.         Allergies   Allergen Reactions     Lisinopril Cough     Phenylephrine Hcl Other (See Comments)     **Entex  HEADACHE (SEVERE)      "Phenylpropanolamine Other (See Comments)     **Entex  HEADACHE (SEVERE)     Pseudoephedrine Tannate Other (See Comments)     **Entex  HEADACHE (SEVERE)     Levofloxacin Rash     **Levaquin     Moxifloxacin Hcl [Avelox] Rash       Review Of Systems:  Constitutional:    denies fever, weight changes, chills, and night sweats.  Eyes:    denies blurred or double vision  Ears/Nose/Throat:   denies ear pain, nose problems, difficulty swallowing  Respiratory:   denies shortness of breath, cough   Skin:   denies rash, lesions  Cardiovascular:   denies chest pain, edema  Gastrointestinal:   denies abdominal pain, bloating, nausea, early satiety; no change in bowel habits or blood in stool  Genitourinary:   denies difficulty with urination, blood in urine  Musculoskeletal:    denies new muscle pain, bone pain  Neurologic:   denies lightheadedness, headaches, numbness or tingling  Psychiatric:   denies anxiety, depression  Hematologic/Lymphatic/Immunologic:   denies easy bruising, easy bleeding, lumps or bumps noted  Endocrine:   Denies increased thirst      ECOG Performance Status: 1 mild fatigue    Physical Exam:  /70   Pulse 87   Temp 97.3  F (36.3  C) (Tympanic)   Resp 18   Ht 1.6 m (5' 3\")   Wt 74.8 kg (164 lb 14.5 oz)   SpO2 98%   BMI 29.21 kg/m      GENERAL APPEARANCE: Healthy, alert and in no acute distress.  HEENT: Normocephalic, Sclerae anicteric. Oropharynx without ulcers, lesions, or thrush.  NECK:   No asymmetry or masses, no thyromegaly.  LYMPHATICS: No palpable cervical, supraclavicular, axillary, or inguinal nodes   RESP: Lungs clear to auscultation bilaterally, respirations regular and easy  CARDIOVASCULAR: Regular rate and rhythm. Normal S1, S2; no murmur, gallop, or rub.  ABDOMEN: Soft, nontender. Bowel sounds auscultated all 4 quadrants. No palpable organomegaly or masses.  MUSCULOSKELETAL: Extremities without gross deformities noted. No edema of bilateral lower extremities.  NEURO: Alert and " oriented x 3.  Gait steady.  PSYCHIATRIC: Mentation and affect appear normal.  Mood appropriate.    Laboratory:  Results for orders placed or performed in visit on 05/14/20   Comprehensive metabolic panel     Status: Abnormal   Result Value Ref Range    Sodium 135 133 - 144 mmol/L    Potassium 4.0 3.4 - 5.3 mmol/L    Chloride 105 94 - 109 mmol/L    Carbon Dioxide 29 20 - 32 mmol/L    Anion Gap 1 (L) 3 - 14 mmol/L    Glucose 99 70 - 99 mg/dL    Urea Nitrogen 16 7 - 30 mg/dL    Creatinine 0.74 0.52 - 1.04 mg/dL    GFR Estimate 80 >60 mL/min/[1.73_m2]    GFR Estimate If Black >90 >60 mL/min/[1.73_m2]    Calcium 8.6 8.5 - 10.1 mg/dL    Bilirubin Total 0.3 0.2 - 1.3 mg/dL    Albumin 3.4 3.4 - 5.0 g/dL    Protein Total 8.1 6.8 - 8.8 g/dL    Alkaline Phosphatase 105 40 - 150 U/L    ALT 25 0 - 50 U/L    AST 17 0 - 45 U/L   CBC with platelets differential     Status: Abnormal   Result Value Ref Range    WBC 5.6 4.0 - 11.0 10e9/L    RBC Count 3.66 (L) 3.8 - 5.2 10e12/L    Hemoglobin 11.7 11.7 - 15.7 g/dL    Hematocrit 34.5 (L) 35.0 - 47.0 %    MCV 94 78 - 100 fl    MCH 32.0 26.5 - 33.0 pg    MCHC 33.9 31.5 - 36.5 g/dL    RDW 14.3 10.0 - 15.0 %    Platelet Count 214 150 - 450 10e9/L    Diff Method Automated Method     % Neutrophils 52.5 %    % Lymphocytes 25.0 %    % Monocytes 18.2 %    % Eosinophils 1.6 %    % Basophils 0.4 %    % Immature Granulocytes 2.3 %    Nucleated RBCs 0 0 /100    Absolute Neutrophil 3.0 1.6 - 8.3 10e9/L    Absolute Lymphocytes 1.4 0.8 - 5.3 10e9/L    Absolute Monocytes 1.0 0.0 - 1.3 10e9/L    Absolute Eosinophils 0.1 0.0 - 0.7 10e9/L    Absolute Basophils 0.0 0.0 - 0.2 10e9/L    Abs Immature Granulocytes 0.1 0 - 0.4 10e9/L    Absolute Nucleated RBC 0.0      Results for TINA WILLIAM M (MRN [id number]) as of 5/14/2020 15:24   Ref. Range 5/7/2020 14:02   Beta-2-Microglobulin Latest Ref Range: <2.3 mg/L 3.4 (H)   WBC Latest Ref Range: 4.0 - 11.0 10e9/L 5.5   Hemoglobin Latest Ref Range: 11.7 - 15.7 g/dL  11.7   Hematocrit Latest Ref Range: 35.0 - 47.0 % 34.1 (L)   Platelet Count Latest Ref Range: 150 - 450 10e9/L 196   RBC Count Latest Ref Range: 3.8 - 5.2 10e12/L 3.66 (L)   MCV Latest Ref Range: 78 - 100 fl 93   MCH Latest Ref Range: 26.5 - 33.0 pg 32.0   MCHC Latest Ref Range: 31.5 - 36.5 g/dL 34.3   RDW Latest Ref Range: 10.0 - 15.0 % 14.3   Diff Method Unknown Automated Method   % Neutrophils Latest Units: % 55.5   % Lymphocytes Latest Units: % 25.3   % Monocytes Latest Units: % 14.4   % Eosinophils Latest Units: % 3.1   % Basophils Latest Units: % 0.2   % Immature Granulocytes Latest Units: % 1.5   Nucleated RBCs Latest Ref Range: 0 /100 0   Absolute Neutrophil Latest Ref Range: 1.6 - 8.3 10e9/L 3.1   Absolute Lymphocytes Latest Ref Range: 0.8 - 5.3 10e9/L 1.4   Absolute Monocytes Latest Ref Range: 0.0 - 1.3 10e9/L 0.8   Absolute Eosinophils Latest Ref Range: 0.0 - 0.7 10e9/L 0.2   Absolute Basophils Latest Ref Range: 0.0 - 0.2 10e9/L 0.0   Abs Immature Granulocytes Latest Ref Range: 0 - 0.4 10e9/L 0.1   Absolute Nucleated RBC Unknown 0.0   Albumin Fraction Latest Ref Range: 3.7 - 5.1 g/dL 4.1   Alpha 1 Fraction Latest Ref Range: 0.2 - 0.4 g/dL 0.3   Alpha 2 Fraction Latest Ref Range: 0.5 - 0.9 g/dL 0.8   Beta Fraction Latest Ref Range: 0.6 - 1.0 g/dL 0.6   ELP Interpretation: Unknown Monoclonal protei...   Gamma Fraction Latest Ref Range: 0.7 - 1.6 g/dL 2.4 (H)   IGA Latest Ref Range: 84 - 499 mg/dL 13 (L)   IGG Latest Ref Range: 610 - 1,616 mg/dL 2,719 (H)   IGM Latest Ref Range: 35 - 242 mg/dL 14 (L)   [place] Free Lt Chain Latest Ref Range: 0.33 - 1.94 mg/dL 0.81   Kappa Lambda Ratio Latest Ref Range: 0.26 - 1.65  3.00 (H)   Lambda Free Lt Chain Latest Ref Range: 0.57 - 2.63 mg/dL 0.27 (L)   Monoclonal Peak Latest Ref Range: 0.0 g/dL 2.0 (H)   Viscosity Index Latest Ref Range: 1.4 - 1.8  1.9 (H)       Imaging Studies: None completed for today's visit       ASSESSMENT/PLAN:    #1 Multiple myeloma:   IgG kappa  multiple myeloma with 80% involvement of the bone marrow diagnosed June 2019 initially treated with Velcade and Decadron and received 4 cycles with increasing M-spike and kappa/lambda ratio.  Treatment changed to CyBorD on 10/1/2019 with elevating LFT's felt to be related to the Cytoxan so this was decreased with cycle 3 day 8. M-spike down a bit more.  Will continue the same and she will initiate cycle 9 today and follow-up prior to cycle 10-day 1 with myeloma labs.  Will initiate 81mg ASA daily.    #2  Reflux:  Discussed OTC pepcid if needed.    #3  Bone lesions:  Will receive her Zometa in fusion in July.    I encouraged patient to call with any questions or concerns.     Barbie Quintana, NP  APRN, FNP-BC, AOCNP

## 2020-05-12 LAB — B2 MICROGLOB SERPL-MCNC: 3.4 MG/L

## 2020-05-14 ENCOUNTER — ONCOLOGY VISIT (OUTPATIENT)
Dept: ONCOLOGY | Facility: OTHER | Age: 70
End: 2020-05-14
Attending: NURSE PRACTITIONER
Payer: MEDICARE

## 2020-05-14 ENCOUNTER — INFUSION THERAPY VISIT (OUTPATIENT)
Dept: INFUSION THERAPY | Facility: OTHER | Age: 70
End: 2020-05-14
Attending: INTERNAL MEDICINE
Payer: MEDICARE

## 2020-05-14 VITALS
DIASTOLIC BLOOD PRESSURE: 70 MMHG | TEMPERATURE: 97.3 F | OXYGEN SATURATION: 98 % | HEART RATE: 87 BPM | BODY MASS INDEX: 29.22 KG/M2 | WEIGHT: 164.9 LBS | RESPIRATION RATE: 18 BRPM | HEIGHT: 63 IN | SYSTOLIC BLOOD PRESSURE: 110 MMHG

## 2020-05-14 DIAGNOSIS — C90.00 MULTIPLE MYELOMA NOT HAVING ACHIEVED REMISSION (H): Primary | ICD-10-CM

## 2020-05-14 DIAGNOSIS — K21.9 GASTROESOPHAGEAL REFLUX DISEASE, ESOPHAGITIS PRESENCE NOT SPECIFIED: ICD-10-CM

## 2020-05-14 DIAGNOSIS — M89.9 BONE LESION: ICD-10-CM

## 2020-05-14 LAB
ALBUMIN SERPL-MCNC: 3.4 G/DL (ref 3.4–5)
ALP SERPL-CCNC: 105 U/L (ref 40–150)
ALT SERPL W P-5'-P-CCNC: 25 U/L (ref 0–50)
ANION GAP SERPL CALCULATED.3IONS-SCNC: 1 MMOL/L (ref 3–14)
AST SERPL W P-5'-P-CCNC: 17 U/L (ref 0–45)
BASOPHILS # BLD AUTO: 0 10E9/L (ref 0–0.2)
BASOPHILS NFR BLD AUTO: 0.4 %
BILIRUB SERPL-MCNC: 0.3 MG/DL (ref 0.2–1.3)
BUN SERPL-MCNC: 16 MG/DL (ref 7–30)
CALCIUM SERPL-MCNC: 8.6 MG/DL (ref 8.5–10.1)
CHLORIDE SERPL-SCNC: 105 MMOL/L (ref 94–109)
CO2 SERPL-SCNC: 29 MMOL/L (ref 20–32)
CREAT SERPL-MCNC: 0.74 MG/DL (ref 0.52–1.04)
DIFFERENTIAL METHOD BLD: ABNORMAL
EOSINOPHIL # BLD AUTO: 0.1 10E9/L (ref 0–0.7)
EOSINOPHIL NFR BLD AUTO: 1.6 %
ERYTHROCYTE [DISTWIDTH] IN BLOOD BY AUTOMATED COUNT: 14.3 % (ref 10–15)
GFR SERPL CREATININE-BSD FRML MDRD: 80 ML/MIN/{1.73_M2}
GLUCOSE SERPL-MCNC: 99 MG/DL (ref 70–99)
HCT VFR BLD AUTO: 34.5 % (ref 35–47)
HGB BLD-MCNC: 11.7 G/DL (ref 11.7–15.7)
IMM GRANULOCYTES # BLD: 0.1 10E9/L (ref 0–0.4)
IMM GRANULOCYTES NFR BLD: 2.3 %
LYMPHOCYTES # BLD AUTO: 1.4 10E9/L (ref 0.8–5.3)
LYMPHOCYTES NFR BLD AUTO: 25 %
MCH RBC QN AUTO: 32 PG (ref 26.5–33)
MCHC RBC AUTO-ENTMCNC: 33.9 G/DL (ref 31.5–36.5)
MCV RBC AUTO: 94 FL (ref 78–100)
MONOCYTES # BLD AUTO: 1 10E9/L (ref 0–1.3)
MONOCYTES NFR BLD AUTO: 18.2 %
NEUTROPHILS # BLD AUTO: 3 10E9/L (ref 1.6–8.3)
NEUTROPHILS NFR BLD AUTO: 52.5 %
NRBC # BLD AUTO: 0 10*3/UL
NRBC BLD AUTO-RTO: 0 /100
PLATELET # BLD AUTO: 214 10E9/L (ref 150–450)
POTASSIUM SERPL-SCNC: 4 MMOL/L (ref 3.4–5.3)
PROT SERPL-MCNC: 8.1 G/DL (ref 6.8–8.8)
RBC # BLD AUTO: 3.66 10E12/L (ref 3.8–5.2)
SODIUM SERPL-SCNC: 135 MMOL/L (ref 133–144)
WBC # BLD AUTO: 5.6 10E9/L (ref 4–11)

## 2020-05-14 PROCEDURE — 36415 COLL VENOUS BLD VENIPUNCTURE: CPT | Mod: ZL | Performed by: INTERNAL MEDICINE

## 2020-05-14 PROCEDURE — 85025 COMPLETE CBC W/AUTO DIFF WBC: CPT | Mod: ZL | Performed by: INTERNAL MEDICINE

## 2020-05-14 PROCEDURE — 99213 OFFICE O/P EST LOW 20 MIN: CPT | Performed by: NURSE PRACTITIONER

## 2020-05-14 PROCEDURE — G0463 HOSPITAL OUTPT CLINIC VISIT: HCPCS

## 2020-05-14 PROCEDURE — 96401 CHEMO ANTI-NEOPL SQ/IM: CPT

## 2020-05-14 PROCEDURE — 25000128 H RX IP 250 OP 636: Performed by: NURSE PRACTITIONER

## 2020-05-14 PROCEDURE — G0463 HOSPITAL OUTPT CLINIC VISIT: HCPCS | Mod: 25

## 2020-05-14 PROCEDURE — 80053 COMPREHEN METABOLIC PANEL: CPT | Mod: ZL | Performed by: INTERNAL MEDICINE

## 2020-05-14 RX ORDER — MEPERIDINE HYDROCHLORIDE 25 MG/ML
25 INJECTION INTRAMUSCULAR; INTRAVENOUS; SUBCUTANEOUS EVERY 30 MIN PRN
Status: CANCELLED | OUTPATIENT
Start: 2020-06-04

## 2020-05-14 RX ORDER — METHYLPREDNISOLONE SODIUM SUCCINATE 125 MG/2ML
125 INJECTION, POWDER, LYOPHILIZED, FOR SOLUTION INTRAMUSCULAR; INTRAVENOUS
Status: CANCELLED
Start: 2020-05-21

## 2020-05-14 RX ORDER — SODIUM CHLORIDE 9 MG/ML
1000 INJECTION, SOLUTION INTRAVENOUS CONTINUOUS PRN
Status: CANCELLED
Start: 2020-06-04

## 2020-05-14 RX ORDER — LORAZEPAM 2 MG/ML
0.5 INJECTION INTRAMUSCULAR EVERY 4 HOURS PRN
Status: CANCELLED
Start: 2020-05-21

## 2020-05-14 RX ORDER — ONDANSETRON 4 MG/1
8 TABLET, FILM COATED ORAL ONCE
Status: CANCELLED
Start: 2020-06-04

## 2020-05-14 RX ORDER — LORAZEPAM 2 MG/ML
0.5 INJECTION INTRAMUSCULAR EVERY 4 HOURS PRN
Status: CANCELLED
Start: 2020-05-28

## 2020-05-14 RX ORDER — LORAZEPAM 2 MG/ML
0.5 INJECTION INTRAMUSCULAR EVERY 4 HOURS PRN
Status: CANCELLED
Start: 2020-06-04

## 2020-05-14 RX ORDER — EPINEPHRINE 1 MG/ML
0.3 INJECTION, SOLUTION, CONCENTRATE INTRAVENOUS EVERY 5 MIN PRN
Status: CANCELLED | OUTPATIENT
Start: 2020-05-28

## 2020-05-14 RX ORDER — ALBUTEROL SULFATE 90 UG/1
1-2 AEROSOL, METERED RESPIRATORY (INHALATION)
Status: CANCELLED
Start: 2020-05-28

## 2020-05-14 RX ORDER — ALBUTEROL SULFATE 0.83 MG/ML
2.5 SOLUTION RESPIRATORY (INHALATION)
Status: CANCELLED | OUTPATIENT
Start: 2020-05-28

## 2020-05-14 RX ORDER — MEPERIDINE HYDROCHLORIDE 25 MG/ML
25 INJECTION INTRAMUSCULAR; INTRAVENOUS; SUBCUTANEOUS EVERY 30 MIN PRN
Status: CANCELLED | OUTPATIENT
Start: 2020-05-28

## 2020-05-14 RX ORDER — DIPHENHYDRAMINE HYDROCHLORIDE 50 MG/ML
50 INJECTION INTRAMUSCULAR; INTRAVENOUS
Status: CANCELLED
Start: 2020-06-04

## 2020-05-14 RX ORDER — MEPERIDINE HYDROCHLORIDE 25 MG/ML
25 INJECTION INTRAMUSCULAR; INTRAVENOUS; SUBCUTANEOUS EVERY 30 MIN PRN
Status: CANCELLED | OUTPATIENT
Start: 2020-05-14

## 2020-05-14 RX ORDER — NALOXONE HYDROCHLORIDE 0.4 MG/ML
.1-.4 INJECTION, SOLUTION INTRAMUSCULAR; INTRAVENOUS; SUBCUTANEOUS
Status: CANCELLED | OUTPATIENT
Start: 2020-05-14

## 2020-05-14 RX ORDER — EPINEPHRINE 0.3 MG/.3ML
0.3 INJECTION SUBCUTANEOUS EVERY 5 MIN PRN
Status: CANCELLED | OUTPATIENT
Start: 2020-05-14

## 2020-05-14 RX ORDER — METHYLPREDNISOLONE SODIUM SUCCINATE 125 MG/2ML
125 INJECTION, POWDER, LYOPHILIZED, FOR SOLUTION INTRAMUSCULAR; INTRAVENOUS
Status: CANCELLED
Start: 2020-06-04

## 2020-05-14 RX ORDER — EPINEPHRINE 0.3 MG/.3ML
0.3 INJECTION SUBCUTANEOUS EVERY 5 MIN PRN
Status: CANCELLED | OUTPATIENT
Start: 2020-05-21

## 2020-05-14 RX ORDER — DIPHENHYDRAMINE HYDROCHLORIDE 50 MG/ML
50 INJECTION INTRAMUSCULAR; INTRAVENOUS
Status: CANCELLED
Start: 2020-05-21

## 2020-05-14 RX ORDER — ALBUTEROL SULFATE 0.83 MG/ML
2.5 SOLUTION RESPIRATORY (INHALATION)
Status: CANCELLED | OUTPATIENT
Start: 2020-05-21

## 2020-05-14 RX ORDER — ALBUTEROL SULFATE 90 UG/1
1-2 AEROSOL, METERED RESPIRATORY (INHALATION)
Status: CANCELLED
Start: 2020-05-21

## 2020-05-14 RX ORDER — EPINEPHRINE 0.3 MG/.3ML
0.3 INJECTION SUBCUTANEOUS EVERY 5 MIN PRN
Status: CANCELLED | OUTPATIENT
Start: 2020-05-28

## 2020-05-14 RX ORDER — DIPHENHYDRAMINE HYDROCHLORIDE 50 MG/ML
50 INJECTION INTRAMUSCULAR; INTRAVENOUS
Status: CANCELLED
Start: 2020-05-14

## 2020-05-14 RX ORDER — ONDANSETRON 4 MG/1
8 TABLET, FILM COATED ORAL ONCE
Status: COMPLETED | OUTPATIENT
Start: 2020-05-14 | End: 2020-05-14

## 2020-05-14 RX ORDER — EPINEPHRINE 0.3 MG/.3ML
0.3 INJECTION SUBCUTANEOUS EVERY 5 MIN PRN
Status: CANCELLED | OUTPATIENT
Start: 2020-06-04

## 2020-05-14 RX ORDER — LORAZEPAM 2 MG/ML
0.5 INJECTION INTRAMUSCULAR EVERY 4 HOURS PRN
Status: CANCELLED
Start: 2020-05-14

## 2020-05-14 RX ORDER — METHYLPREDNISOLONE SODIUM SUCCINATE 125 MG/2ML
125 INJECTION, POWDER, LYOPHILIZED, FOR SOLUTION INTRAMUSCULAR; INTRAVENOUS
Status: CANCELLED
Start: 2020-05-28

## 2020-05-14 RX ORDER — ALBUTEROL SULFATE 90 UG/1
1-2 AEROSOL, METERED RESPIRATORY (INHALATION)
Status: CANCELLED
Start: 2020-06-04

## 2020-05-14 RX ORDER — SODIUM CHLORIDE 9 MG/ML
1000 INJECTION, SOLUTION INTRAVENOUS CONTINUOUS PRN
Status: CANCELLED
Start: 2020-05-21

## 2020-05-14 RX ORDER — METHYLPREDNISOLONE SODIUM SUCCINATE 125 MG/2ML
125 INJECTION, POWDER, LYOPHILIZED, FOR SOLUTION INTRAMUSCULAR; INTRAVENOUS
Status: CANCELLED
Start: 2020-05-14

## 2020-05-14 RX ORDER — ONDANSETRON 4 MG/1
8 TABLET, FILM COATED ORAL ONCE
Status: CANCELLED
Start: 2020-05-21

## 2020-05-14 RX ORDER — SODIUM CHLORIDE 9 MG/ML
1000 INJECTION, SOLUTION INTRAVENOUS CONTINUOUS PRN
Status: CANCELLED
Start: 2020-05-14

## 2020-05-14 RX ORDER — ONDANSETRON 4 MG/1
8 TABLET, FILM COATED ORAL ONCE
Status: CANCELLED
Start: 2020-05-28

## 2020-05-14 RX ORDER — EPINEPHRINE 1 MG/ML
0.3 INJECTION, SOLUTION, CONCENTRATE INTRAVENOUS EVERY 5 MIN PRN
Status: CANCELLED | OUTPATIENT
Start: 2020-05-21

## 2020-05-14 RX ORDER — EPINEPHRINE 1 MG/ML
0.3 INJECTION, SOLUTION, CONCENTRATE INTRAVENOUS EVERY 5 MIN PRN
Status: CANCELLED | OUTPATIENT
Start: 2020-06-04

## 2020-05-14 RX ORDER — ALBUTEROL SULFATE 0.83 MG/ML
2.5 SOLUTION RESPIRATORY (INHALATION)
Status: CANCELLED | OUTPATIENT
Start: 2020-06-04

## 2020-05-14 RX ORDER — SODIUM CHLORIDE 9 MG/ML
1000 INJECTION, SOLUTION INTRAVENOUS CONTINUOUS PRN
Status: CANCELLED
Start: 2020-05-28

## 2020-05-14 RX ORDER — MEPERIDINE HYDROCHLORIDE 25 MG/ML
25 INJECTION INTRAMUSCULAR; INTRAVENOUS; SUBCUTANEOUS EVERY 30 MIN PRN
Status: CANCELLED | OUTPATIENT
Start: 2020-05-21

## 2020-05-14 RX ORDER — DIPHENHYDRAMINE HYDROCHLORIDE 50 MG/ML
50 INJECTION INTRAMUSCULAR; INTRAVENOUS
Status: CANCELLED
Start: 2020-05-28

## 2020-05-14 RX ORDER — NALOXONE HYDROCHLORIDE 0.4 MG/ML
.1-.4 INJECTION, SOLUTION INTRAMUSCULAR; INTRAVENOUS; SUBCUTANEOUS
Status: CANCELLED | OUTPATIENT
Start: 2020-05-21

## 2020-05-14 RX ORDER — ALBUTEROL SULFATE 90 UG/1
1-2 AEROSOL, METERED RESPIRATORY (INHALATION)
Status: CANCELLED
Start: 2020-05-14

## 2020-05-14 RX ORDER — NALOXONE HYDROCHLORIDE 0.4 MG/ML
.1-.4 INJECTION, SOLUTION INTRAMUSCULAR; INTRAVENOUS; SUBCUTANEOUS
Status: CANCELLED | OUTPATIENT
Start: 2020-06-04

## 2020-05-14 RX ORDER — EPINEPHRINE 1 MG/ML
0.3 INJECTION, SOLUTION, CONCENTRATE INTRAVENOUS EVERY 5 MIN PRN
Status: CANCELLED | OUTPATIENT
Start: 2020-05-14

## 2020-05-14 RX ORDER — ALBUTEROL SULFATE 0.83 MG/ML
2.5 SOLUTION RESPIRATORY (INHALATION)
Status: CANCELLED | OUTPATIENT
Start: 2020-05-14

## 2020-05-14 RX ORDER — ONDANSETRON 8 MG/1
8 TABLET, FILM COATED ORAL ONCE
Status: CANCELLED | OUTPATIENT
Start: 2020-05-14

## 2020-05-14 RX ORDER — NALOXONE HYDROCHLORIDE 0.4 MG/ML
.1-.4 INJECTION, SOLUTION INTRAMUSCULAR; INTRAVENOUS; SUBCUTANEOUS
Status: CANCELLED | OUTPATIENT
Start: 2020-05-28

## 2020-05-14 RX ADMIN — BORTEZOMIB 2.7 MG: 3.5 INJECTION, POWDER, LYOPHILIZED, FOR SOLUTION INTRAVENOUS; SUBCUTANEOUS at 15:19

## 2020-05-14 RX ADMIN — ONDANSETRON HYDROCHLORIDE 8 MG: 4 TABLET, FILM COATED ORAL at 15:16

## 2020-05-14 ASSESSMENT — MIFFLIN-ST. JEOR: SCORE: 1232.13

## 2020-05-14 ASSESSMENT — PAIN SCALES - GENERAL: PAINLEVEL: NO PAIN (0)

## 2020-05-14 NOTE — PROGRESS NOTES
Patient is a 69 year old female here today for injection of Velcade per order of . Patient meets parameters for today's infusion. Patient identified with two identifiers, order verified, and verbal consent for today's infusion obtained from patient. Written consent for treatment is on file and valid.    Recent lab values: WBC:5.6, HGB: 11.7, PLT: 214  ANC  3.0 . Patient meets order parameters for today's treatment.    Velcade dose of 2.7  verified with Barron RICH RN, prior to release of drug.      Patient denies questions or concerns regarding injection and/or medication(s) being administered.  Velcade injected SQ into right upper back arm at 45 degree angle per protocol rotating sites.     Injection administered per protocol. Patient tolerated infusion without incident, no signs or symptoms of adverse reaction noted. Patient denies pain or discomfort.     Covered with a sterile bandage. Pt instructed to leave bandage intact for a minimum of one hour, and to call with questions or concerns. Copy of appointments, discharge instructions, and after visit summary (AVS) provided to patient. Patient states understanding, discharged ambulatory.

## 2020-05-14 NOTE — PATIENT INSTRUCTIONS
We will see you back as planned. If you have any questions or concerns, we can be reached Monday through Friday 8am - 430pm at [phone] ([facility] [place] If you have concerns related to a potential reaction/side effect after hours/weekends/holiday's, please seek emergent medical care.

## 2020-05-14 NOTE — NURSING NOTE
"Chief Complaint   Patient presents with     RECHECK     Follow up Multiple Myeloma       Initial /70   Pulse 87   Temp 97.3  F (36.3  C) (Tympanic)   Resp 18   Ht 1.6 m (5' 3\")   Wt 74.8 kg (164 lb 14.5 oz)   SpO2 98%   BMI 29.21 kg/m   Estimated body mass index is 29.21 kg/m  as calculated from the following:    Height as of this encounter: 1.6 m (5' 3\").    Weight as of this encounter: 74.8 kg (164 lb 14.5 oz).  Medication Reconciliation: complete.  Immunizations and advance directives status reviewed. Pain scale =0 , PHQ-2=0.            Vilma Chi LPN    "

## 2020-05-14 NOTE — PATIENT INSTRUCTIONS
We would like to see you back in 3 weeks. Please come 30 minutes prior for lab work.     When you are in need of a refill, please call your pharmacy and they will send us a request.     If you have any questions please call [phone]    Other instructions:      1.Try over the counter Pepcid (famotidine) for heartburn if needed.  2.  Start a baby Aspirin daily

## 2020-05-21 ENCOUNTER — INFUSION THERAPY VISIT (OUTPATIENT)
Dept: INFUSION THERAPY | Facility: OTHER | Age: 70
End: 2020-05-21
Attending: INTERNAL MEDICINE
Payer: MEDICARE

## 2020-05-21 VITALS
WEIGHT: 163.14 LBS | HEIGHT: 63 IN | TEMPERATURE: 99 F | OXYGEN SATURATION: 95 % | DIASTOLIC BLOOD PRESSURE: 78 MMHG | BODY MASS INDEX: 28.91 KG/M2 | HEART RATE: 90 BPM | SYSTOLIC BLOOD PRESSURE: 115 MMHG

## 2020-05-21 DIAGNOSIS — C90.00 MULTIPLE MYELOMA NOT HAVING ACHIEVED REMISSION (H): Primary | ICD-10-CM

## 2020-05-21 LAB
ALBUMIN SERPL-MCNC: 3.5 G/DL (ref 3.4–5)
ALP SERPL-CCNC: 94 U/L (ref 40–150)
ALT SERPL W P-5'-P-CCNC: 25 U/L (ref 0–50)
AST SERPL W P-5'-P-CCNC: 19 U/L (ref 0–45)
BASOPHILS # BLD AUTO: 0 10E9/L (ref 0–0.2)
BASOPHILS NFR BLD AUTO: 0.2 %
BILIRUB DIRECT SERPL-MCNC: <0.1 MG/DL (ref 0–0.2)
BILIRUB SERPL-MCNC: 0.4 MG/DL (ref 0.2–1.3)
DIFFERENTIAL METHOD BLD: ABNORMAL
EOSINOPHIL # BLD AUTO: 0.1 10E9/L (ref 0–0.7)
EOSINOPHIL NFR BLD AUTO: 2.5 %
ERYTHROCYTE [DISTWIDTH] IN BLOOD BY AUTOMATED COUNT: 14.4 % (ref 10–15)
HCT VFR BLD AUTO: 33.4 % (ref 35–47)
HGB BLD-MCNC: 11.5 G/DL (ref 11.7–15.7)
IMM GRANULOCYTES # BLD: 0.1 10E9/L (ref 0–0.4)
IMM GRANULOCYTES NFR BLD: 1.3 %
LYMPHOCYTES # BLD AUTO: 1.3 10E9/L (ref 0.8–5.3)
LYMPHOCYTES NFR BLD AUTO: 27.9 %
MCH RBC QN AUTO: 31.8 PG (ref 26.5–33)
MCHC RBC AUTO-ENTMCNC: 34.4 G/DL (ref 31.5–36.5)
MCV RBC AUTO: 92 FL (ref 78–100)
MONOCYTES # BLD AUTO: 0.9 10E9/L (ref 0–1.3)
MONOCYTES NFR BLD AUTO: 19.7 %
NEUTROPHILS # BLD AUTO: 2.3 10E9/L (ref 1.6–8.3)
NEUTROPHILS NFR BLD AUTO: 48.4 %
NRBC # BLD AUTO: 0 10*3/UL
NRBC BLD AUTO-RTO: 0 /100
PLATELET # BLD AUTO: 222 10E9/L (ref 150–450)
PROT SERPL-MCNC: 8.2 G/DL (ref 6.8–8.8)
RBC # BLD AUTO: 3.62 10E12/L (ref 3.8–5.2)
WBC # BLD AUTO: 4.8 10E9/L (ref 4–11)

## 2020-05-21 PROCEDURE — 25000128 H RX IP 250 OP 636: Performed by: NURSE PRACTITIONER

## 2020-05-21 PROCEDURE — 96401 CHEMO ANTI-NEOPL SQ/IM: CPT

## 2020-05-21 PROCEDURE — 85025 COMPLETE CBC W/AUTO DIFF WBC: CPT | Mod: ZL | Performed by: NURSE PRACTITIONER

## 2020-05-21 PROCEDURE — 80076 HEPATIC FUNCTION PANEL: CPT | Mod: ZL | Performed by: NURSE PRACTITIONER

## 2020-05-21 PROCEDURE — 36415 COLL VENOUS BLD VENIPUNCTURE: CPT | Mod: ZL | Performed by: NURSE PRACTITIONER

## 2020-05-21 RX ORDER — ONDANSETRON 4 MG/1
8 TABLET, FILM COATED ORAL ONCE
Status: COMPLETED | OUTPATIENT
Start: 2020-05-21 | End: 2020-05-21

## 2020-05-21 RX ADMIN — BORTEZOMIB 2.7 MG: 3.5 INJECTION, POWDER, LYOPHILIZED, FOR SOLUTION INTRAVENOUS; SUBCUTANEOUS at 14:53

## 2020-05-21 RX ADMIN — ONDANSETRON HYDROCHLORIDE 8 MG: 4 TABLET, FILM COATED ORAL at 14:52

## 2020-05-21 ASSESSMENT — MIFFLIN-ST. JEOR: SCORE: 1224

## 2020-05-21 NOTE — PROGRESS NOTES
Patient is a 69 year old female here today for injection of Velcade per order of . Patient meets parameters for today's infusion. Patient identified with two identifiers, order verified, and verbal consent for today's infusion obtained from patient.     Independent dose check with Anne Lan RN prior to release of drug.    Today's lab values: WBC: 4.8, HGB: 11.5, PLT: 222  ANC: 2.3. Patient meets order parameters for today's treatment.    Patient denies questions or concerns regarding injection and/or medication(s) being administered.

## 2020-05-27 ENCOUNTER — VIRTUAL VISIT (OUTPATIENT)
Dept: FAMILY MEDICINE | Facility: OTHER | Age: 70
End: 2020-05-27
Attending: PHYSICIAN ASSISTANT
Payer: COMMERCIAL

## 2020-05-27 VITALS — BODY MASS INDEX: 28.17 KG/M2 | WEIGHT: 159 LBS

## 2020-05-27 DIAGNOSIS — I10 ESSENTIAL HYPERTENSION WITH GOAL BLOOD PRESSURE LESS THAN 140/90: ICD-10-CM

## 2020-05-27 DIAGNOSIS — C90.00 MULTIPLE MYELOMA NOT HAVING ACHIEVED REMISSION (H): ICD-10-CM

## 2020-05-27 DIAGNOSIS — E78.2 MIXED HYPERLIPIDEMIA: ICD-10-CM

## 2020-05-27 DIAGNOSIS — F41.1 GAD (GENERALIZED ANXIETY DISORDER): ICD-10-CM

## 2020-05-27 PROCEDURE — 99214 OFFICE O/P EST MOD 30 MIN: CPT | Mod: TEL | Performed by: PHYSICIAN ASSISTANT

## 2020-05-27 RX ORDER — ACYCLOVIR 400 MG/1
400 TABLET ORAL 2 TIMES DAILY
Qty: 60 TABLET | Refills: 3 | Status: SHIPPED | OUTPATIENT
Start: 2020-05-27 | End: 2020-10-19

## 2020-05-27 RX ORDER — LOSARTAN POTASSIUM 50 MG/1
50 TABLET ORAL DAILY
Qty: 90 TABLET | Refills: 3 | Status: SHIPPED | OUTPATIENT
Start: 2020-05-27 | End: 2021-04-19

## 2020-05-27 RX ORDER — LOSARTAN POTASSIUM 50 MG/1
50 TABLET ORAL DAILY
Qty: 90 TABLET | Refills: 3 | Status: SHIPPED | OUTPATIENT
Start: 2020-05-27 | End: 2020-05-27

## 2020-05-27 RX ORDER — ATORVASTATIN CALCIUM 10 MG/1
10 TABLET, FILM COATED ORAL DAILY
Qty: 90 TABLET | Refills: 3 | Status: SHIPPED | OUTPATIENT
Start: 2020-05-27 | End: 2021-04-19

## 2020-05-27 ASSESSMENT — ANXIETY QUESTIONNAIRES
6. BECOMING EASILY ANNOYED OR IRRITABLE: NOT AT ALL
GAD7 TOTAL SCORE: 0
2. NOT BEING ABLE TO STOP OR CONTROL WORRYING: NOT AT ALL
1. FEELING NERVOUS, ANXIOUS, OR ON EDGE: NOT AT ALL
IF YOU CHECKED OFF ANY PROBLEMS ON THIS QUESTIONNAIRE, HOW DIFFICULT HAVE THESE PROBLEMS MADE IT FOR YOU TO DO YOUR WORK, TAKE CARE OF THINGS AT HOME, OR GET ALONG WITH OTHER PEOPLE: NOT DIFFICULT AT ALL
3. WORRYING TOO MUCH ABOUT DIFFERENT THINGS: NOT AT ALL
7. FEELING AFRAID AS IF SOMETHING AWFUL MIGHT HAPPEN: NOT AT ALL
5. BEING SO RESTLESS THAT IT IS HARD TO SIT STILL: NOT AT ALL

## 2020-05-27 ASSESSMENT — PATIENT HEALTH QUESTIONNAIRE - PHQ9
SUM OF ALL RESPONSES TO PHQ QUESTIONS 1-9: 0
5. POOR APPETITE OR OVEREATING: NOT AT ALL

## 2020-05-27 ASSESSMENT — PAIN SCALES - GENERAL: PAINLEVEL: NO PAIN (0)

## 2020-05-27 NOTE — PROGRESS NOTES
"Carmelita Watts is a 69 year old female who is being evaluated via a billable telephone visit.      The patient has been notified of following:     \"This telephone visit will be conducted via a call between you and your physician/provider. We have found that certain health care needs can be provided without the need for a physical exam.  This service lets us provide the care you need with a short phone conversation.  If a prescription is necessary we can send it directly to your pharmacy.  If lab work is needed we can place an order for that and you can then stop by our lab to have the test done at a later time.    Telephone visits are billed at different rates depending on your insurance coverage. During this emergency period, for some insurers they may be billed the same as an in-person visit.  Please reach out to your insurance provider with any questions.    If during the course of the call the physician/provider feels a telephone visit is not appropriate, you will not be charged for this service.\"    Patient has given verbal consent for Telephone visit?  Yes    What phone number would you like to be contacted at? [phone]    How would you like to obtain your AVS? Anastasia Ceballos     Carmelita Watts is a 69 year old female who presents via phone visit today for the following health issues:    HPI  Hyperlipidemia Follow-Up      Are you regularly taking any medication or supplement to lower your cholesterol?   Yes- Lipitor    Are you having muscle aches or other side effects that you think could be caused by your cholesterol lowering medication?  No    Hypertension Follow-up      Do you check your blood pressure regularly outside of the clinic? Yes     Are you following a low salt diet? Yes    Are your blood pressures ever more than 140 on the top number (systolic) OR more   than 90 on the bottom number (diastolic), for example 140/90? No    Depression and Anxiety Follow-Up    How are you doing with your " depression since your last visit? Improved     How are you doing with your anxiety since your last visit?  Improved     Are you having other symptoms that might be associated with depression or anxiety? No    Have you had a significant life event? OTHER: Has multiple myeloma and  has pancreatic cancer     Do you have any concerns with your use of alcohol or other drugs? No    Social History     Tobacco Use     Smoking status: Never Smoker     Smokeless tobacco: Never Used     Tobacco comment: Tried to Quit (YES); QUIT in 1971; Passive Exposure (NO)   Substance Use Topics     Alcohol use: Yes     Comment: RARELY     Drug use: No     PHQ 11/26/2019 12/20/2019 5/27/2020   PHQ-9 Total Score 3 2 0   Q9: Thoughts of better off dead/self-harm past 2 weeks Not at all Not at all Not at all     MINAL-7 SCORE 10/3/2019 11/14/2019 5/27/2020   Total Score 2 3 0     Last PHQ-9 5/27/2020   1.  Little interest or pleasure in doing things 0   2.  Feeling down, depressed, or hopeless 0   3.  Trouble falling or staying asleep, or sleeping too much 0   4.  Feeling tired or having little energy 0   5.  Poor appetite or overeating 0   6.  Feeling bad about yourself 0   7.  Trouble concentrating 0   8.  Moving slowly or restless 0   Q9: Thoughts of better off dead/self-harm past 2 weeks 0   PHQ-9 Total Score 0   Difficulty at work, home, or with people Not difficult at all     MINAL-7  5/27/2020   1. Feeling nervous, anxious, or on edge 0   2. Not being able to stop or control worrying 0   3. Worrying too much about different things 0   4. Trouble relaxing 0   5. Being so restless that it is hard to sit still 0   6. Becoming easily annoyed or irritable 0   7. Feeling afraid, as if something awful might happen 0   MINAL-7 Total Score 0   If you checked any problems, how difficult have they made it for you to do your work, take care of things at home, or get along with other people? Not difficult at all         Suicide Assessment Five-step  "Evaluation and Treatment (SAFE-T)      How many servings of fruits and vegetables do you eat daily?  2-3    On average, how many sweetened beverages do you drink each day (Examples: soda, juice, sweet tea, etc.  Do NOT count diet or artificially sweetened beverages)?   0    How many days per week do you exercise enough to make your heart beat faster? 4    How many minutes a day do you exercise enough to make your heart beat faster? 30 - 60    How many days per week do you miss taking your medication? 0             Patient Active Problem List   Diagnosis     Hyperlipidemia LDL goal <130     Hypertension goal BP (blood pressure) < 140/80     Advanced care planning/counseling discussion     Major depressive disorder, recurrent episode, mild (H)     Seasonal allergies     Mixed hyperlipidemia     ACP (advance care planning)     Multiple myeloma not having achieved remission (H)     Past Surgical History:   Procedure Laterality Date     APPENDECTOMY       BONE MARROW BIOPSY, BONE SPECIMEN, NEEDLE/TROCAR N/A 6/18/2019    Procedure: BONE MARROW BIOPSY;  Surgeon: Maciej Sanz MD;  Location: [facility]     CHOLECYSTECTOMY       COLONOSCOPY  07-    repeat 10 years     COLONOSCOPY N/A 12/30/2016    Procedure: COLONOSCOPY;  Surgeon: Bhaskar Franklin DO;  Location: [facility]     SINUS SURGERY       TUBAL STERILIZATION         Social History     Tobacco Use     Smoking status: Never Smoker     Smokeless tobacco: Never Used     Tobacco comment: Tried to Quit (YES); QUIT in 1971; Passive Exposure (NO)   Substance Use Topics     Alcohol use: Yes     Comment: RARELY     Family History   Problem Relation Age of Onset     Breast Cancer Mother 66        Cause of Death     Parkinsonism Father         \"Possible\"     Coronary Artery Disease Father      Pacemaker Father      Thyroid Disease Daughter      Diabetes No family hx of      Hypertension No family hx of      Hyperlipidemia No family hx of      Cerebrovascular Disease No " family hx of      Colon Cancer No family hx of      Prostate Cancer No family hx of      Genetic Disorder No family hx of      Asthma No family hx of      Anesthesia Reaction No family hx of          Current Outpatient Medications   Medication Sig Dispense Refill     acyclovir (ZOVIRAX) 400 MG tablet Take 1 tablet (400 mg) by mouth 2 times daily Viral Prophylaxis. 60 tablet 3     atorvastatin (LIPITOR) 10 MG tablet Take 1 tablet (10 mg) by mouth daily 90 tablet 3     Bortezomib (VELCADE IJ)        cyclophosphamide 50 MG PO capsule Take 3 capsules (150 mg) by mouth daily On days 1, 8, 15, 22 12 capsule 11     dexamethasone (DECADRON) 4 MG tablet TAKE 10 TABLETS BY MOUTH DAILY ON DAYS 1, 8, 15, AND 22 120 tablet 3     EPINEPHrine (EPIPEN) 0.3 MG/0.3ML injection Inject 0.3 mg into the muscle once as needed. Use as needed for anaphylaxis.       fluticasone (FLONASE) 50 MCG/ACT nasal spray SPRAY 2 SPRAYS INTO BOTH NOSTRILS DAILY 48 mL 0     LORazepam (ATIVAN) 0.5 MG tablet Take 1 tablet (0.5 mg) by mouth every 4 hours as needed (Anxiety, Nausea/Vomiting or Sleep) 30 tablet 2     losartan (COZAAR) 50 MG tablet Take 1 tablet (50 mg) by mouth daily 90 tablet 3     ondansetron (ZOFRAN) 8 MG tablet Take 1 tablet (8 mg) by mouth every 8 hours as needed (Nausea/Vomiting) 10 tablet 2     prochlorperazine (COMPAZINE) 10 MG tablet Take 1 tablet (10 mg) by mouth every 6 hours as needed (Nausea/Vomiting) 30 tablet 3     sertraline (ZOLOFT) 50 MG tablet Take 1 tablet (50 mg) by mouth daily 45 tablet 3     Allergies   Allergen Reactions     Lisinopril Cough     Phenylephrine Hcl Other (See Comments)     **Entex  HEADACHE (SEVERE)     Phenylpropanolamine Other (See Comments)     **Entex  HEADACHE (SEVERE)     Pseudoephedrine Tannate Other (See Comments)     **Entex  HEADACHE (SEVERE)     Levofloxacin Rash     **Levaquin     Moxifloxacin Hcl [Avelox] Rash     Recent Labs   Lab Test 05/21/20  1330 05/14/20  1335 04/30/20  1342   02/14/20  0947  01/03/19  1043  11/17/17  0844   LDL  --   --   --   --  125*  --  86  --  108*   HDL  --   --   --   --  44*  --  42*  --  60   TRIG  --   --   --   --  270*  --  126  --  78   ALT 25 25 23   < >  --    < >  --    < > 27   CR  --  0.74 0.61   < >  --    < >  --    < > 0.69   GFRESTIMATED  --  80 >90   < >  --    < >  --    < > 84   GFRESTBLACK  --  >90 >90   < >  --    < >  --    < > >90   POTASSIUM  --  4.0 3.8   < >  --    < >  --    < > 4.1   TSH  --   --   --   --  1.86  --   --   --  1.77    < > = values in this interval not displayed.      BP Readings from Last 3 Encounters:   05/21/20 115/78   05/14/20 110/70   05/07/20 119/79    Wt Readings from Last 3 Encounters:   05/27/20 72.1 kg (159 lb)   05/21/20 74 kg (163 lb 2.3 oz)   05/14/20 74.8 kg (164 lb 14.5 oz)                    Reviewed and updated as needed this visit by Provider         Review of Systems   Constitutional, HEENT, cardiovascular, pulmonary, gi and gu systems are negative, except as otherwise noted.       Objective   Reported vitals:  Wt 72.1 kg (159 lb)   BMI 28.17 kg/m     healthy, alert and no distress  PSYCH: Alert and oriented times 3; coherent speech, normal   rate and volume, able to articulate logical thoughts, able   to abstract reason, no tangential thoughts, no hallucinations   or delusions  Her affect is normal  RESP: No cough, no audible wheezing, able to talk in full sentences  Remainder of exam unable to be completed due to telephone visits    Diagnostic Test Results:  Labs reviewed in Epic  No results found for this or any previous visit (from the past 24 hour(s)).  No results found for any visits on 05/27/20.        Assessment/Plan:  1. MINAL (generalized anxiety disorder)  She is only on 1/2 pill.  has pancreatic cancer and she is in treatment for multiple myeloma.  Doing very well. Feels up lifted in clinic visits and reassured finding distracting tasks are very healthy.   - sertraline (ZOLOFT) 50 MG  tablet; Take 1 tablet (50 mg) by mouth daily  Dispense: 45 tablet; Refill: 3    2. Essential hypertension with goal blood pressure less than 140/90  Having checked in chemo. Has been very good. On asa now as her platelets are back in range.   - losartan (COZAAR) 50 MG tablet; Take 1 tablet (50 mg) by mouth daily  Dispense: 90 tablet; Refill: 3    3. Mixed hyperlipidemia  Tolerating well. Given refill. Labs to do in July.   - atorvastatin (LIPITOR) 10 MG tablet; Take 1 tablet (10 mg) by mouth daily  Dispense: 90 tablet; Refill: 3    4. Multiple myeloma not having achieved remission (H)  Refill given. Helps her with hsv out break.   - acyclovir (ZOVIRAX) 400 MG tablet; Take 1 tablet (400 mg) by mouth 2 times daily Viral Prophylaxis.  Dispense: 60 tablet; Refill: 3    No follow-ups on file.      Phone call duration:  25  minutes    VENKATESH Trevino

## 2020-05-27 NOTE — NURSING NOTE
"Chief Complaint   Patient presents with     Depression     Hypertension     Lipids       Initial Wt 72.1 kg (159 lb)   BMI 28.17 kg/m   Estimated body mass index is 28.17 kg/m  as calculated from the following:    Height as of 5/21/20: 1.6 m (5' 2.99\").    Weight as of this encounter: 72.1 kg (159 lb).  Medication Reconciliation: complete  Luz Maria Mcghee LPN    "

## 2020-05-28 ENCOUNTER — INFUSION THERAPY VISIT (OUTPATIENT)
Dept: INFUSION THERAPY | Facility: OTHER | Age: 70
End: 2020-05-28
Attending: INTERNAL MEDICINE
Payer: MEDICARE

## 2020-05-28 VITALS
HEIGHT: 63 IN | HEART RATE: 75 BPM | DIASTOLIC BLOOD PRESSURE: 74 MMHG | WEIGHT: 162.7 LBS | SYSTOLIC BLOOD PRESSURE: 113 MMHG | OXYGEN SATURATION: 98 % | RESPIRATION RATE: 16 BRPM | BODY MASS INDEX: 28.83 KG/M2 | TEMPERATURE: 98 F

## 2020-05-28 DIAGNOSIS — C90.00 MULTIPLE MYELOMA NOT HAVING ACHIEVED REMISSION (H): Primary | ICD-10-CM

## 2020-05-28 LAB
ALBUMIN SERPL-MCNC: 3.4 G/DL (ref 3.4–5)
ALP SERPL-CCNC: 98 U/L (ref 40–150)
ALT SERPL W P-5'-P-CCNC: 22 U/L (ref 0–50)
AST SERPL W P-5'-P-CCNC: 14 U/L (ref 0–45)
BASOPHILS # BLD AUTO: 0 10E9/L (ref 0–0.2)
BASOPHILS NFR BLD AUTO: 0.2 %
BILIRUB DIRECT SERPL-MCNC: <0.1 MG/DL (ref 0–0.2)
BILIRUB SERPL-MCNC: 0.4 MG/DL (ref 0.2–1.3)
DIFFERENTIAL METHOD BLD: ABNORMAL
EOSINOPHIL # BLD AUTO: 0.1 10E9/L (ref 0–0.7)
EOSINOPHIL NFR BLD AUTO: 2 %
ERYTHROCYTE [DISTWIDTH] IN BLOOD BY AUTOMATED COUNT: 14.6 % (ref 10–15)
HCT VFR BLD AUTO: 34 % (ref 35–47)
HGB BLD-MCNC: 11.6 G/DL (ref 11.7–15.7)
IMM GRANULOCYTES # BLD: 0.1 10E9/L (ref 0–0.4)
IMM GRANULOCYTES NFR BLD: 2.4 %
LYMPHOCYTES # BLD AUTO: 1.1 10E9/L (ref 0.8–5.3)
LYMPHOCYTES NFR BLD AUTO: 24.3 %
MCH RBC QN AUTO: 31.7 PG (ref 26.5–33)
MCHC RBC AUTO-ENTMCNC: 34.1 G/DL (ref 31.5–36.5)
MCV RBC AUTO: 93 FL (ref 78–100)
MONOCYTES # BLD AUTO: 0.9 10E9/L (ref 0–1.3)
MONOCYTES NFR BLD AUTO: 19.3 %
NEUTROPHILS # BLD AUTO: 2.4 10E9/L (ref 1.6–8.3)
NEUTROPHILS NFR BLD AUTO: 51.8 %
NRBC # BLD AUTO: 0 10*3/UL
NRBC BLD AUTO-RTO: 0 /100
PLATELET # BLD AUTO: 207 10E9/L (ref 150–450)
PROT SERPL-MCNC: 8.1 G/DL (ref 6.8–8.8)
RBC # BLD AUTO: 3.66 10E12/L (ref 3.8–5.2)
WBC # BLD AUTO: 4.6 10E9/L (ref 4–11)

## 2020-05-28 PROCEDURE — 85025 COMPLETE CBC W/AUTO DIFF WBC: CPT | Mod: ZL | Performed by: NURSE PRACTITIONER

## 2020-05-28 PROCEDURE — 80076 HEPATIC FUNCTION PANEL: CPT | Mod: ZL | Performed by: NURSE PRACTITIONER

## 2020-05-28 PROCEDURE — 96401 CHEMO ANTI-NEOPL SQ/IM: CPT

## 2020-05-28 PROCEDURE — 36415 COLL VENOUS BLD VENIPUNCTURE: CPT | Mod: ZL | Performed by: NURSE PRACTITIONER

## 2020-05-28 PROCEDURE — 25000128 H RX IP 250 OP 636: Performed by: NURSE PRACTITIONER

## 2020-05-28 RX ORDER — ONDANSETRON 4 MG/1
8 TABLET, FILM COATED ORAL ONCE
Status: COMPLETED | OUTPATIENT
Start: 2020-05-28 | End: 2020-05-28

## 2020-05-28 RX ORDER — ASPIRIN 81 MG/1
81 TABLET, CHEWABLE ORAL DAILY
COMMUNITY

## 2020-05-28 RX ADMIN — ONDANSETRON HYDROCHLORIDE 8 MG: 4 TABLET, FILM COATED ORAL at 12:09

## 2020-05-28 RX ADMIN — BORTEZOMIB 2.7 MG: 3.5 INJECTION, POWDER, LYOPHILIZED, FOR SOLUTION INTRAVENOUS; SUBCUTANEOUS at 12:27

## 2020-05-28 ASSESSMENT — PAIN SCALES - GENERAL: PAINLEVEL: NO PAIN (0)

## 2020-05-28 ASSESSMENT — MIFFLIN-ST. JEOR: SCORE: 1222

## 2020-05-28 ASSESSMENT — ANXIETY QUESTIONNAIRES: GAD7 TOTAL SCORE: 0

## 2020-05-28 NOTE — PROGRESS NOTES
Patient is a 69 year old female here today for injection of Velcade per order of . Patient meets parameters for today's infusion. Patient identified with two identifiers, order verified, and verbal consent for today's infusion obtained from patient. Written consent for treatment is on file and valid.    Today's lab values: WBC: 4.6, HGB: 11.6, PLT: 207  ANC: 2.4.     Patient meets order parameters for today's treatment.    Patient denies questions or concerns regarding injection and/or medication(s) being administered.    Independent dose check completed with Julia Reynoso RN.    Velcade injected SQ into right upper outter arm  At a 45 degree angle  per protocol rotating sites.     Injection administered per protocol. Patient tolerated infusion without incident, no signs or symptoms of adverse reaction noted. Patient denies pain or discomfort.     Covered with a sterile bandage. Pt instructed to leave bandage intact for a minimum of one hour, and to call with questions or concerns. Patient states understanding, discharged.

## 2020-06-04 ENCOUNTER — INFUSION THERAPY VISIT (OUTPATIENT)
Dept: INFUSION THERAPY | Facility: OTHER | Age: 70
End: 2020-06-04
Attending: INTERNAL MEDICINE
Payer: MEDICARE

## 2020-06-04 VITALS
DIASTOLIC BLOOD PRESSURE: 69 MMHG | SYSTOLIC BLOOD PRESSURE: 125 MMHG | OXYGEN SATURATION: 97 % | HEART RATE: 72 BPM | WEIGHT: 165.79 LBS | BODY MASS INDEX: 29.38 KG/M2 | RESPIRATION RATE: 16 BRPM | HEIGHT: 63 IN | TEMPERATURE: 96.9 F

## 2020-06-04 DIAGNOSIS — C90.00 MULTIPLE MYELOMA NOT HAVING ACHIEVED REMISSION (H): Primary | ICD-10-CM

## 2020-06-04 LAB
BASOPHILS # BLD AUTO: 0 10E9/L (ref 0–0.2)
BASOPHILS NFR BLD AUTO: 0.4 %
DIFFERENTIAL METHOD BLD: NORMAL
EOSINOPHIL # BLD AUTO: 0.1 10E9/L (ref 0–0.7)
EOSINOPHIL NFR BLD AUTO: 1.9 %
ERYTHROCYTE [DISTWIDTH] IN BLOOD BY AUTOMATED COUNT: 14.6 % (ref 10–15)
HCT VFR BLD AUTO: 35.4 % (ref 35–47)
HGB BLD-MCNC: 12.1 G/DL (ref 11.7–15.7)
IMM GRANULOCYTES # BLD: 0.1 10E9/L (ref 0–0.4)
IMM GRANULOCYTES NFR BLD: 1.5 %
LYMPHOCYTES # BLD AUTO: 1.3 10E9/L (ref 0.8–5.3)
LYMPHOCYTES NFR BLD AUTO: 27.1 %
MCH RBC QN AUTO: 31.7 PG (ref 26.5–33)
MCHC RBC AUTO-ENTMCNC: 34.2 G/DL (ref 31.5–36.5)
MCV RBC AUTO: 93 FL (ref 78–100)
MONOCYTES # BLD AUTO: 0.7 10E9/L (ref 0–1.3)
MONOCYTES NFR BLD AUTO: 15.4 %
NEUTROPHILS # BLD AUTO: 2.5 10E9/L (ref 1.6–8.3)
NEUTROPHILS NFR BLD AUTO: 53.7 %
NRBC # BLD AUTO: 0 10*3/UL
NRBC BLD AUTO-RTO: 0 /100
PLATELET # BLD AUTO: 200 10E9/L (ref 150–450)
RBC # BLD AUTO: 3.82 10E12/L (ref 3.8–5.2)
WBC # BLD AUTO: 4.7 10E9/L (ref 4–11)

## 2020-06-04 PROCEDURE — 85810 BLOOD VISCOSITY EXAMINATION: CPT | Mod: ZL | Performed by: NURSE PRACTITIONER

## 2020-06-04 PROCEDURE — 83883 ASSAY NEPHELOMETRY NOT SPEC: CPT | Mod: ZL | Performed by: NURSE PRACTITIONER

## 2020-06-04 PROCEDURE — 82784 ASSAY IGA/IGD/IGG/IGM EACH: CPT | Mod: ZL | Performed by: NURSE PRACTITIONER

## 2020-06-04 PROCEDURE — 85025 COMPLETE CBC W/AUTO DIFF WBC: CPT | Mod: ZL | Performed by: NURSE PRACTITIONER

## 2020-06-04 PROCEDURE — 36415 COLL VENOUS BLD VENIPUNCTURE: CPT | Mod: ZL | Performed by: NURSE PRACTITIONER

## 2020-06-04 PROCEDURE — 82232 ASSAY OF BETA-2 PROTEIN: CPT | Mod: ZL | Performed by: NURSE PRACTITIONER

## 2020-06-04 PROCEDURE — 96401 CHEMO ANTI-NEOPL SQ/IM: CPT

## 2020-06-04 PROCEDURE — 84165 PROTEIN E-PHORESIS SERUM: CPT | Mod: ZL | Performed by: NURSE PRACTITIONER

## 2020-06-04 PROCEDURE — 25000128 H RX IP 250 OP 636: Performed by: NURSE PRACTITIONER

## 2020-06-04 PROCEDURE — 00000402 ZZHCL STATISTIC TOTAL PROTEIN: Mod: ZL | Performed by: NURSE PRACTITIONER

## 2020-06-04 RX ORDER — ONDANSETRON 4 MG/1
8 TABLET, FILM COATED ORAL ONCE
Status: COMPLETED | OUTPATIENT
Start: 2020-06-04 | End: 2020-06-04

## 2020-06-04 RX ADMIN — ONDANSETRON HYDROCHLORIDE 8 MG: 4 TABLET, FILM COATED ORAL at 14:43

## 2020-06-04 RX ADMIN — BORTEZOMIB 2.7 MG: 3.5 INJECTION, POWDER, LYOPHILIZED, FOR SOLUTION INTRAVENOUS; SUBCUTANEOUS at 14:47

## 2020-06-04 ASSESSMENT — PAIN SCALES - GENERAL: PAINLEVEL: NO PAIN (0)

## 2020-06-04 ASSESSMENT — MIFFLIN-ST. JEOR: SCORE: 1236.13

## 2020-06-04 NOTE — PROGRESS NOTES
Patient is a 69 year old female here today for injection of Velcade per order of . Patient meets parameters for today's infusion. Patient identified with two identifiers, order verified, and verbal consent for today's infusion obtained from patient. Written consent for treatment is on file and valid.    Recent lab values: WBC: 4.7, HGB: 12.1, PLT: 200  ANC: 2.5. Patient meets order parameters for today's treatment.  Patient denies questions or concerns regarding injection and/or medication(s) being administered.  Velcade injected SQ into left upper outer arm per protocol rotating sites.     Injection administered per protocol. Patient tolerated infusion without incident, no signs or symptoms of adverse reaction noted. Patient denies pain or discomfort.     Covered with a sterile bandage. Pt instructed to leave bandage intact for a minimum of one hour, and to call with questions or concerns. Copy of appointments, discharge instructions, and after visit summary (AVS) provided to patient. Patient states understanding, discharged ambulatory.

## 2020-06-08 LAB
ALBUMIN SERPL ELPH-MCNC: 4.2 G/DL (ref 3.7–5.1)
ALPHA1 GLOB SERPL ELPH-MCNC: 0.3 G/DL (ref 0.2–0.4)
ALPHA2 GLOB SERPL ELPH-MCNC: 0.7 G/DL (ref 0.5–0.9)
B-GLOBULIN SERPL ELPH-MCNC: 0.6 G/DL (ref 0.6–1)
GAMMA GLOB SERPL ELPH-MCNC: 2.2 G/DL (ref 0.7–1.6)
IGA SERPL-MCNC: 11 MG/DL (ref 84–499)
IGG SERPL-MCNC: 2506 MG/DL (ref 610–1616)
IGM SERPL-MCNC: 11 MG/DL (ref 35–242)
KAPPA LC UR-MCNC: 0.67 MG/DL (ref 0.33–1.94)
KAPPA LC/LAMBDA SER: 2.58 {RATIO} (ref 0.26–1.65)
LAMBDA LC SERPL-MCNC: 0.26 MG/DL (ref 0.57–2.63)
M PROTEIN SERPL ELPH-MCNC: 2 G/DL
PROT PATTERN SERPL ELPH-IMP: ABNORMAL
VISC SER: 1.8 CP (ref 1.4–1.8)

## 2020-06-09 LAB — B2 MICROGLOB SERPL-MCNC: 2.7 MG/L

## 2020-06-10 DIAGNOSIS — C90.00 MULTIPLE MYELOMA NOT HAVING ACHIEVED REMISSION (H): Primary | ICD-10-CM

## 2020-06-10 RX ORDER — METHYLPREDNISOLONE SODIUM SUCCINATE 125 MG/2ML
125 INJECTION, POWDER, LYOPHILIZED, FOR SOLUTION INTRAMUSCULAR; INTRAVENOUS
Status: CANCELLED
Start: 2020-07-02

## 2020-06-10 RX ORDER — LORAZEPAM 2 MG/ML
0.5 INJECTION INTRAMUSCULAR EVERY 4 HOURS PRN
Status: CANCELLED
Start: 2020-06-25

## 2020-06-10 RX ORDER — EPINEPHRINE 0.3 MG/.3ML
0.3 INJECTION SUBCUTANEOUS EVERY 5 MIN PRN
Status: CANCELLED | OUTPATIENT
Start: 2020-06-18

## 2020-06-10 RX ORDER — ALBUTEROL SULFATE 90 UG/1
1-2 AEROSOL, METERED RESPIRATORY (INHALATION)
Status: CANCELLED
Start: 2020-07-02

## 2020-06-10 RX ORDER — METHYLPREDNISOLONE SODIUM SUCCINATE 125 MG/2ML
125 INJECTION, POWDER, LYOPHILIZED, FOR SOLUTION INTRAMUSCULAR; INTRAVENOUS
Status: CANCELLED
Start: 2020-06-11

## 2020-06-10 RX ORDER — SODIUM CHLORIDE 9 MG/ML
1000 INJECTION, SOLUTION INTRAVENOUS CONTINUOUS PRN
Status: CANCELLED
Start: 2020-06-18

## 2020-06-10 RX ORDER — EPINEPHRINE 1 MG/ML
0.3 INJECTION, SOLUTION, CONCENTRATE INTRAVENOUS EVERY 5 MIN PRN
Status: CANCELLED | OUTPATIENT
Start: 2020-07-02

## 2020-06-10 RX ORDER — DIPHENHYDRAMINE HYDROCHLORIDE 50 MG/ML
50 INJECTION INTRAMUSCULAR; INTRAVENOUS
Status: CANCELLED
Start: 2020-07-02

## 2020-06-10 RX ORDER — DIPHENHYDRAMINE HYDROCHLORIDE 50 MG/ML
50 INJECTION INTRAMUSCULAR; INTRAVENOUS
Status: CANCELLED
Start: 2020-06-25

## 2020-06-10 RX ORDER — ONDANSETRON 4 MG/1
8 TABLET, FILM COATED ORAL ONCE
Status: CANCELLED
Start: 2020-06-18

## 2020-06-10 RX ORDER — ONDANSETRON 8 MG/1
8 TABLET, FILM COATED ORAL ONCE
Status: CANCELLED | OUTPATIENT
Start: 2020-06-11

## 2020-06-10 RX ORDER — LORAZEPAM 2 MG/ML
0.5 INJECTION INTRAMUSCULAR EVERY 4 HOURS PRN
Status: CANCELLED
Start: 2020-06-11

## 2020-06-10 RX ORDER — MEPERIDINE HYDROCHLORIDE 25 MG/ML
25 INJECTION INTRAMUSCULAR; INTRAVENOUS; SUBCUTANEOUS EVERY 30 MIN PRN
Status: CANCELLED | OUTPATIENT
Start: 2020-06-25

## 2020-06-10 RX ORDER — NALOXONE HYDROCHLORIDE 0.4 MG/ML
.1-.4 INJECTION, SOLUTION INTRAMUSCULAR; INTRAVENOUS; SUBCUTANEOUS
Status: CANCELLED | OUTPATIENT
Start: 2020-06-18

## 2020-06-10 RX ORDER — ONDANSETRON 8 MG/1
8 TABLET, FILM COATED ORAL ONCE
Status: CANCELLED
Start: 2020-07-02

## 2020-06-10 RX ORDER — EPINEPHRINE 1 MG/ML
0.3 INJECTION, SOLUTION, CONCENTRATE INTRAVENOUS EVERY 5 MIN PRN
Status: CANCELLED | OUTPATIENT
Start: 2020-06-18

## 2020-06-10 RX ORDER — DIPHENHYDRAMINE HYDROCHLORIDE 50 MG/ML
50 INJECTION INTRAMUSCULAR; INTRAVENOUS
Status: CANCELLED
Start: 2020-06-18

## 2020-06-10 RX ORDER — NALOXONE HYDROCHLORIDE 0.4 MG/ML
.1-.4 INJECTION, SOLUTION INTRAMUSCULAR; INTRAVENOUS; SUBCUTANEOUS
Status: CANCELLED | OUTPATIENT
Start: 2020-06-25

## 2020-06-10 RX ORDER — MEPERIDINE HYDROCHLORIDE 25 MG/ML
25 INJECTION INTRAMUSCULAR; INTRAVENOUS; SUBCUTANEOUS EVERY 30 MIN PRN
Status: CANCELLED | OUTPATIENT
Start: 2020-07-02

## 2020-06-10 RX ORDER — DIPHENHYDRAMINE HYDROCHLORIDE 50 MG/ML
50 INJECTION INTRAMUSCULAR; INTRAVENOUS
Status: CANCELLED
Start: 2020-06-11

## 2020-06-10 RX ORDER — METHYLPREDNISOLONE SODIUM SUCCINATE 125 MG/2ML
125 INJECTION, POWDER, LYOPHILIZED, FOR SOLUTION INTRAMUSCULAR; INTRAVENOUS
Status: CANCELLED
Start: 2020-06-25

## 2020-06-10 RX ORDER — EPINEPHRINE 0.3 MG/.3ML
0.3 INJECTION SUBCUTANEOUS EVERY 5 MIN PRN
Status: CANCELLED | OUTPATIENT
Start: 2020-06-25

## 2020-06-10 RX ORDER — ALBUTEROL SULFATE 90 UG/1
1-2 AEROSOL, METERED RESPIRATORY (INHALATION)
Status: CANCELLED
Start: 2020-06-18

## 2020-06-10 RX ORDER — SODIUM CHLORIDE 9 MG/ML
1000 INJECTION, SOLUTION INTRAVENOUS CONTINUOUS PRN
Status: CANCELLED
Start: 2020-06-25

## 2020-06-10 RX ORDER — NALOXONE HYDROCHLORIDE 0.4 MG/ML
.1-.4 INJECTION, SOLUTION INTRAMUSCULAR; INTRAVENOUS; SUBCUTANEOUS
Status: CANCELLED | OUTPATIENT
Start: 2020-07-02

## 2020-06-10 RX ORDER — ALBUTEROL SULFATE 90 UG/1
1-2 AEROSOL, METERED RESPIRATORY (INHALATION)
Status: CANCELLED
Start: 2020-06-25

## 2020-06-10 RX ORDER — ALBUTEROL SULFATE 0.83 MG/ML
2.5 SOLUTION RESPIRATORY (INHALATION)
Status: CANCELLED | OUTPATIENT
Start: 2020-06-18

## 2020-06-10 RX ORDER — ALBUTEROL SULFATE 0.83 MG/ML
2.5 SOLUTION RESPIRATORY (INHALATION)
Status: CANCELLED | OUTPATIENT
Start: 2020-06-11

## 2020-06-10 RX ORDER — ALBUTEROL SULFATE 0.83 MG/ML
2.5 SOLUTION RESPIRATORY (INHALATION)
Status: CANCELLED | OUTPATIENT
Start: 2020-06-25

## 2020-06-10 RX ORDER — LORAZEPAM 2 MG/ML
0.5 INJECTION INTRAMUSCULAR EVERY 4 HOURS PRN
Status: CANCELLED
Start: 2020-06-18

## 2020-06-10 RX ORDER — SODIUM CHLORIDE 9 MG/ML
1000 INJECTION, SOLUTION INTRAVENOUS CONTINUOUS PRN
Status: CANCELLED
Start: 2020-06-11

## 2020-06-10 RX ORDER — MEPERIDINE HYDROCHLORIDE 25 MG/ML
25 INJECTION INTRAMUSCULAR; INTRAVENOUS; SUBCUTANEOUS EVERY 30 MIN PRN
Status: CANCELLED | OUTPATIENT
Start: 2020-06-18

## 2020-06-10 RX ORDER — EPINEPHRINE 1 MG/ML
0.3 INJECTION, SOLUTION, CONCENTRATE INTRAVENOUS EVERY 5 MIN PRN
Status: CANCELLED | OUTPATIENT
Start: 2020-06-25

## 2020-06-10 RX ORDER — EPINEPHRINE 1 MG/ML
0.3 INJECTION, SOLUTION, CONCENTRATE INTRAVENOUS EVERY 5 MIN PRN
Status: CANCELLED | OUTPATIENT
Start: 2020-06-11

## 2020-06-10 RX ORDER — SODIUM CHLORIDE 9 MG/ML
1000 INJECTION, SOLUTION INTRAVENOUS CONTINUOUS PRN
Status: CANCELLED
Start: 2020-07-02

## 2020-06-10 RX ORDER — LORAZEPAM 2 MG/ML
0.5 INJECTION INTRAMUSCULAR EVERY 4 HOURS PRN
Status: CANCELLED
Start: 2020-07-02

## 2020-06-10 RX ORDER — METHYLPREDNISOLONE SODIUM SUCCINATE 125 MG/2ML
125 INJECTION, POWDER, LYOPHILIZED, FOR SOLUTION INTRAMUSCULAR; INTRAVENOUS
Status: CANCELLED
Start: 2020-06-18

## 2020-06-10 RX ORDER — EPINEPHRINE 0.3 MG/.3ML
0.3 INJECTION SUBCUTANEOUS EVERY 5 MIN PRN
Status: CANCELLED | OUTPATIENT
Start: 2020-06-11

## 2020-06-10 RX ORDER — NALOXONE HYDROCHLORIDE 0.4 MG/ML
.1-.4 INJECTION, SOLUTION INTRAMUSCULAR; INTRAVENOUS; SUBCUTANEOUS
Status: CANCELLED | OUTPATIENT
Start: 2020-06-11

## 2020-06-10 RX ORDER — ALBUTEROL SULFATE 0.83 MG/ML
2.5 SOLUTION RESPIRATORY (INHALATION)
Status: CANCELLED | OUTPATIENT
Start: 2020-07-02

## 2020-06-10 RX ORDER — EPINEPHRINE 0.3 MG/.3ML
0.3 INJECTION SUBCUTANEOUS EVERY 5 MIN PRN
Status: CANCELLED | OUTPATIENT
Start: 2020-07-02

## 2020-06-10 RX ORDER — ONDANSETRON 4 MG/1
8 TABLET, FILM COATED ORAL ONCE
Status: CANCELLED
Start: 2020-06-25

## 2020-06-10 RX ORDER — ALBUTEROL SULFATE 90 UG/1
1-2 AEROSOL, METERED RESPIRATORY (INHALATION)
Status: CANCELLED
Start: 2020-06-11

## 2020-06-10 RX ORDER — MEPERIDINE HYDROCHLORIDE 25 MG/ML
25 INJECTION INTRAMUSCULAR; INTRAVENOUS; SUBCUTANEOUS EVERY 30 MIN PRN
Status: CANCELLED | OUTPATIENT
Start: 2020-06-11

## 2020-06-10 NOTE — PROGRESS NOTES
Oncology Follow-up Visit:  June 11, 2020    Reason for Visit:  Patient presents with:  RECHECK: Follow up Multiple myeloma      Nursing Note and documentation reviewed: yes    HPI:  This is a 69-year-old female patient who presents to the oncology clinic today for evaluation prior to receiving cycle 10 chemotherapy for Stage II-RISS IgG multiple myeloma diagnosed June 2019.  She progressed on Velcade and dexamethasone and is now receiving CyBorD with decreased dose of cytoxan related to LFT elevation.      She presents today stating she is doing okay.  She is fatigued today as she was up early grocery shopping.  She michael have some mild fatigue most days.  She has increased stress at home and cares for her .  She continues with palpitations after her chemo and feels they may be related to stress also.  She had some dizziness yesterday and tells me she had increased her zoloft from 25mg to 50mg.    Oncologic History:     12/31/2018   patient presented to the emergency room with vertigo and fatigue.  CT scan of the head was negative and subsequent stress test was negative.  5/3/2019  She was seen by her PCP who ordered lab work and noted a total protein of 12.9.  SPEP at that time showed an M spike of 6.2 with a large monoclonal protein seen in the gamma fraction.  Urine immunofixation showed a possible small protein band in the gamma fraction  5/31/2019  she was evaluated by Dr. Walker with Medical Oncology with plan to rule out myeloma and obtain bone marrow aspiration biopsy as well as a metastatic bone survey along with additional labwork  6/18/2019 she underwent bone marrow aspiration and biopsy  6/24/2019  She was seen again by Dr. Walker and CBC showed a hemoglobin of 9.3, M spike 7.3 with monoclonal IgG immunoglobulin of kappa light chain type; serum viscosity was 2.9; quantitative immunoglobulins showed an IgG of 8160, beta-2 microglobulin was 5.8, BUN was 21 with creatinine is 0.8 and total protein  was 13.7.  Quantitative kappa/lambda free light chains showed an elevated ratio of 17.0 bone marrow aspiration biopsy showed plasma cell myeloma with approximately 80% plasma cells.  Immunofixation showed IgG kappa and flow cytometry revealed kappa monotypic plasma cells consistent with clonal plasma cell neoplasm and FISH panel was pending at that time.  It was felt she had at least stage II disease based on her beta-2 microglobulin and anemia.  Plan was to treat with Velcade 1.3 mg per metered squared days 1, 4, 8 and 11/Decadron 40 mg on days 1, 8 and 15 initiation of Revlimid with the second cycle at 25 mg daily days 1 through 14.  Plan was to also obtain an MRI of the lumbar spine to rule out lytic lesion at L3.  She was initiated on Zovirax 400 mg p.o. twice daily.  6/25/2019  1st cycle of chemotherapy initiated  7/1/2019 note in chart regarding patient's large co-pay for the Revlimid and no plan at this point to initiate Revlimid and treat only with Velcade and Decadron per Dr. Walker  7/11/2019  MRI lumbar spine shows a pathologic superior endplate compression fracture at L3 without evidence of retropulsed fragment and innumerable enhancing lesions throughout the lumbar spine consistent with history of multiple myeloma.  9/10/2019  Increasing M-spike and kappa lambda ratio  10/1/2019  Initiation of CyBorD     Current Chemo Regime/TX:  Velcade 1.5mg.m2/cyclophosphamide 150mg every 7 days on days 1,8,15 and 22/decadron 40mg days 1,8,15,22       **Zometa 4mg every 3 months  Current Cycle: 10  # of completed cycles:  9     Previous treatment:   Velcade 1.3 mg/m2 days 1, 4, 8 and 11 with Decadron 40 mg days 1, 8 and 15 x 4 cycles    Past Medical History:   Diagnosis Date     Arthritis      Depressive disorder      Essential hypertension 10/1/2015     Major depressive disorder, recurrent episode, mild (H) 10/1/2015     Mixed hyperlipidemia 10/1/2015     Multiple myeloma not having achieved remission (H)  "6/24/2019     Other specified disorders of bladder 07/09/2012    Irritable Bladder     Seasonal allergies 10/1/2015     Unspecified essential hypertension 03/19/2007     Unspecified sinusitis (chronic) 09/05/2007       Past Surgical History:   Procedure Laterality Date     APPENDECTOMY       BONE MARROW BIOPSY, BONE SPECIMEN, NEEDLE/TROCAR N/A 6/18/2019    Procedure: BONE MARROW BIOPSY;  Surgeon: Maciej Sanz MD;  Location: [facility]     CHOLECYSTECTOMY       COLONOSCOPY  07-    repeat 10 years     COLONOSCOPY N/A 12/30/2016    Procedure: COLONOSCOPY;  Surgeon: Bhaskar Franklin DO;  Location: [facility]     SINUS SURGERY       TUBAL STERILIZATION         Family History   Problem Relation Age of Onset     Breast Cancer Mother 66        Cause of Death     Parkinsonism Father         \"Possible\"     Coronary Artery Disease Father      Pacemaker Father      Thyroid Disease Daughter      Diabetes No family hx of      Hypertension No family hx of      Hyperlipidemia No family hx of      Cerebrovascular Disease No family hx of      Colon Cancer No family hx of      Prostate Cancer No family hx of      Genetic Disorder No family hx of      Asthma No family hx of      Anesthesia Reaction No family hx of        Social History     Socioeconomic History     Marital status:      Spouse name: Not on file     Number of children: Not on file     Years of education: Not on file     Highest education level: Not on file   Occupational History     Occupation: Financial     Comment:  - (FT)   Social Needs     Financial resource strain: Not on file     Food insecurity     Worry: Not on file     Inability: Not on file     Transportation needs     Medical: Not on file     Non-medical: Not on file   Tobacco Use     Smoking status: Never Smoker     Smokeless tobacco: Never Used     Tobacco comment: Tried to Quit (YES); QUIT in 1971; Passive Exposure (NO)   Substance and Sexual Activity     Alcohol use: Yes     " Comment: RARELY     Drug use: No     Sexual activity: Yes     Partners: Male     Birth control/protection: None   Lifestyle     Physical activity     Days per week: Not on file     Minutes per session: Not on file     Stress: Not on file   Relationships     Social connections     Talks on phone: Not on file     Gets together: Not on file     Attends Buddhist service: Not on file     Active member of club or organization: Not on file     Attends meetings of clubs or organizations: Not on file     Relationship status: Not on file     Intimate partner violence     Fear of current or ex partner: Not on file     Emotionally abused: Not on file     Physically abused: Not on file     Forced sexual activity: Not on file   Other Topics Concern      Service Not Asked     Blood Transfusions Not Asked     Caffeine Concern Yes     Comment: Coffee >6 cups daily     Occupational Exposure Not Asked     Hobby Hazards Not Asked     Sleep Concern Not Asked     Stress Concern Not Asked     Weight Concern Not Asked     Special Diet Not Asked     Back Care Not Asked     Exercise Not Asked     Bike Helmet Not Asked     Seat Belt Not Asked     Self-Exams Not Asked     Parent/sibling w/ CABG, MI or angioplasty before 65F 55M? No   Social History Narrative     Not on file       Current Outpatient Medications   Medication     acyclovir (ZOVIRAX) 400 MG tablet     aspirin (ASA) 81 MG chewable tablet     atorvastatin (LIPITOR) 10 MG tablet     Bortezomib (VELCADE IJ)     cyclophosphamide 50 MG PO capsule     dexamethasone (DECADRON) 4 MG tablet     fluticasone (FLONASE) 50 MCG/ACT nasal spray     losartan (COZAAR) 50 MG tablet     sertraline (ZOLOFT) 50 MG tablet     EPINEPHrine (EPIPEN) 0.3 MG/0.3ML injection     LORazepam (ATIVAN) 0.5 MG tablet     ondansetron (ZOFRAN) 8 MG tablet     prochlorperazine (COMPAZINE) 10 MG tablet     No current facility-administered medications for this visit.         Allergies   Allergen Reactions      "Lisinopril Cough     Phenylephrine Hcl Other (See Comments)     **Entex  HEADACHE (SEVERE)     Phenylpropanolamine Other (See Comments)     **Entex  HEADACHE (SEVERE)     Pseudoephedrine Tannate Other (See Comments)     **Entex  HEADACHE (SEVERE)     Levofloxacin Rash     **Levaquin     Moxifloxacin Hcl [Avelox] Rash       Review Of Systems:  Constitutional:    denies fever, weight changes, chills, and night sweats.  Eyes:    denies blurred or double vision  Ears/Nose/Throat:   denies ear pain, nose problems, difficulty swallowing  Respiratory:   denies shortness of breath, cough   Skin:   denies rash, lesions  Cardiovascular:   denies chest pain, palpitations, edema  Gastrointestinal:   denies abdominal pain, bloating, nausea, early satiety; no change in bowel habits or blood in stool  Genitourinary:   denies difficulty with urination, blood in urine  Musculoskeletal:    denies new muscle pain, bone pain  Neurologic:   denies headaches, numbness or tingling  Psychiatric:   denies anxiety, depression  Hematologic/Lymphatic/Immunologic:   denies easy bruising, easy bleeding, lumps or bumps noted  Endocrine:   Denies increased thirst      ECOG Performance Status: 1    Physical Exam:  /75   Pulse 92   Temp 97.8  F (36.6  C) (Tympanic)   Resp 18   Ht 1.6 m (5' 3\")   Wt 74.7 kg (164 lb 10.9 oz)   SpO2 97%   BMI 29.17 kg/m      GENERAL APPEARANCE: Healthy, alert and in no acute distress.  HEENT: Normocephalic, Sclerae anicteric. Oropharynx without ulcers, lesions, or thrush.  NECK:   No asymmetry or masses, no thyromegaly.  LYMPHATICS: No palpable cervical, supraclavicular, axillary, or inguinal nodes   RESP: Lungs clear to auscultation bilaterally, respirations regular and easy  CARDIOVASCULAR: Regular rate and rhythm. Normal S1, S2; no murmur, gallop, or rub.  ABDOMEN: Soft, nontender. Bowel sounds auscultated all 4 quadrants. No palpable organomegaly or masses.  MUSCULOSKELETAL: Extremities without gross " deformities noted. No edema of bilateral lower extremities.  SKIN: No suspicious lesions or rashes.  NEURO: Alert and oriented x 3.  Gait steady.  PSYCHIATRIC: Mentation and affect appear normal.  Mood appropriate.    Laboratory:    Results for orders placed or performed in visit on 06/11/20   Comprehensive metabolic panel     Status: Abnormal   Result Value Ref Range    Sodium 135 133 - 144 mmol/L    Potassium 3.9 3.4 - 5.3 mmol/L    Chloride 108 94 - 109 mmol/L    Carbon Dioxide 23 20 - 32 mmol/L    Anion Gap 4 3 - 14 mmol/L    Glucose 110 (H) 70 - 99 mg/dL    Urea Nitrogen 12 7 - 30 mg/dL    Creatinine 0.72 0.52 - 1.04 mg/dL    GFR Estimate 85 >60 mL/min/[1.73_m2]    GFR Estimate If Black >90 >60 mL/min/[1.73_m2]    Calcium 8.2 (L) 8.5 - 10.1 mg/dL    Bilirubin Total 0.4 0.2 - 1.3 mg/dL    Albumin 3.3 (L) 3.4 - 5.0 g/dL    Protein Total 8.1 6.8 - 8.8 g/dL    Alkaline Phosphatase 109 40 - 150 U/L    ALT 21 0 - 50 U/L    AST 18 0 - 45 U/L   CBC with platelets differential     Status: Abnormal   Result Value Ref Range    WBC 4.7 4.0 - 11.0 10e9/L    RBC Count 3.72 (L) 3.8 - 5.2 10e12/L    Hemoglobin 11.9 11.7 - 15.7 g/dL    Hematocrit 34.5 (L) 35.0 - 47.0 %    MCV 93 78 - 100 fl    MCH 32.0 26.5 - 33.0 pg    MCHC 34.5 31.5 - 36.5 g/dL    RDW 14.5 10.0 - 15.0 %    Platelet Count 194 150 - 450 10e9/L    Diff Method Automated Method     % Neutrophils 55.3 %    % Lymphocytes 25.9 %    % Monocytes 15.4 %    % Eosinophils 1.7 %    % Basophils 0.4 %    % Immature Granulocytes 1.3 %    Nucleated RBCs 0 0 /100    Absolute Neutrophil 2.6 1.6 - 8.3 10e9/L    Absolute Lymphocytes 1.2 0.8 - 5.3 10e9/L    Absolute Monocytes 0.7 0.0 - 1.3 10e9/L    Absolute Eosinophils 0.1 0.0 - 0.7 10e9/L    Absolute Basophils 0.0 0.0 - 0.2 10e9/L    Abs Immature Granulocytes 0.1 0 - 0.4 10e9/L    Absolute Nucleated RBC 0.0        Component      Latest Ref Rng & Units 6/4/2020   Albumin Fraction      3.7 - 5.1 g/dL 4.2   Alpha 1 Fraction      0.2  - 0.4 g/dL 0.3   Alpha 2 Fraction      0.5 - 0.9 g/dL 0.7   Beta Fraction      0.6 - 1.0 g/dL 0.6   Gamma Fraction      0.7 - 1.6 g/dL 2.2 (H)   Monoclonal Peak      0.0 g/dL 2.0 (H)   ELP Interpretation:       Large monoclonal protein (about 2.0 g/dL) seen in the gamma fraction.  Previously . . .   IGG      610 - 1,616 mg/dL 2,506 (H)   IGA      84 - 499 mg/dL 11 (L)   IGM      35 - 242 mg/dL 11 (L)   Solomons Free Lt Chain      0.33 - 1.94 mg/dL 0.67   Lambda Free Lt Chain      0.57 - 2.63 mg/dL 0.26 (L)   Kappa Lambda Ratio      0.26 - 1.65 2.58 (H)   Beta-2-Microglobulin      <2.3 mg/L 2.7 (H)   Viscosity Index      1.4 - 1.8 1.8     Imaging Studies:  None completed for today's visit        ASSESSMENT/PLAN:    #1 Multiple myeloma:   IgG kappa multiple myeloma with 80% involvement of the bone marrow diagnosed June 2019 initially treated with Velcade and Decadron and received 4 cycles with increasing M-spike and kappa/lambda ratio.  Treatment changed to CyBorD on 10/1/2019 with elevating LFT's felt to be related to the Cytoxan so this was decreased with cycle 3 day 8. No change in M-spike. Will continue the same and she will initiate cycle 10 today and follow-up prior to cycle 11-day 1 with myeloma labs.       #2  Bone lesions:  Will receive her Zometa infusion in July.    #3  Depression/anxiety:  I recommended she increase her zoloft a little slower and try 1 1/2 tabs daily for a week or so as this may be the source of her dizziness yesterday.    I encouraged patient to call with any questions or concerns.       Barbie Quintana, NP  APRN, FNP-BC, AOCNP

## 2020-06-11 ENCOUNTER — ONCOLOGY VISIT (OUTPATIENT)
Dept: ONCOLOGY | Facility: OTHER | Age: 70
End: 2020-06-11
Attending: NURSE PRACTITIONER
Payer: MEDICARE

## 2020-06-11 ENCOUNTER — INFUSION THERAPY VISIT (OUTPATIENT)
Dept: INFUSION THERAPY | Facility: OTHER | Age: 70
End: 2020-06-11
Attending: INTERNAL MEDICINE
Payer: MEDICARE

## 2020-06-11 VITALS — DIASTOLIC BLOOD PRESSURE: 92 MMHG | SYSTOLIC BLOOD PRESSURE: 133 MMHG

## 2020-06-11 VITALS
WEIGHT: 164.68 LBS | HEIGHT: 63 IN | TEMPERATURE: 97.8 F | OXYGEN SATURATION: 97 % | BODY MASS INDEX: 29.18 KG/M2 | RESPIRATION RATE: 18 BRPM | DIASTOLIC BLOOD PRESSURE: 75 MMHG | SYSTOLIC BLOOD PRESSURE: 115 MMHG | HEART RATE: 92 BPM

## 2020-06-11 DIAGNOSIS — M89.9 BONE LESION: ICD-10-CM

## 2020-06-11 DIAGNOSIS — C90.00 MULTIPLE MYELOMA NOT HAVING ACHIEVED REMISSION (H): Primary | ICD-10-CM

## 2020-06-11 DIAGNOSIS — F33.0 MAJOR DEPRESSIVE DISORDER, RECURRENT EPISODE, MILD (H): ICD-10-CM

## 2020-06-11 LAB
ALBUMIN SERPL-MCNC: 3.3 G/DL (ref 3.4–5)
ALP SERPL-CCNC: 109 U/L (ref 40–150)
ALT SERPL W P-5'-P-CCNC: 21 U/L (ref 0–50)
ANION GAP SERPL CALCULATED.3IONS-SCNC: 4 MMOL/L (ref 3–14)
AST SERPL W P-5'-P-CCNC: 18 U/L (ref 0–45)
BASOPHILS # BLD AUTO: 0 10E9/L (ref 0–0.2)
BASOPHILS NFR BLD AUTO: 0.4 %
BILIRUB SERPL-MCNC: 0.4 MG/DL (ref 0.2–1.3)
BUN SERPL-MCNC: 12 MG/DL (ref 7–30)
CALCIUM SERPL-MCNC: 8.2 MG/DL (ref 8.5–10.1)
CHLORIDE SERPL-SCNC: 108 MMOL/L (ref 94–109)
CO2 SERPL-SCNC: 23 MMOL/L (ref 20–32)
CREAT SERPL-MCNC: 0.72 MG/DL (ref 0.52–1.04)
DIFFERENTIAL METHOD BLD: ABNORMAL
EOSINOPHIL # BLD AUTO: 0.1 10E9/L (ref 0–0.7)
EOSINOPHIL NFR BLD AUTO: 1.7 %
ERYTHROCYTE [DISTWIDTH] IN BLOOD BY AUTOMATED COUNT: 14.5 % (ref 10–15)
GFR SERPL CREATININE-BSD FRML MDRD: 85 ML/MIN/{1.73_M2}
GLUCOSE SERPL-MCNC: 110 MG/DL (ref 70–99)
HCT VFR BLD AUTO: 34.5 % (ref 35–47)
HGB BLD-MCNC: 11.9 G/DL (ref 11.7–15.7)
IMM GRANULOCYTES # BLD: 0.1 10E9/L (ref 0–0.4)
IMM GRANULOCYTES NFR BLD: 1.3 %
LYMPHOCYTES # BLD AUTO: 1.2 10E9/L (ref 0.8–5.3)
LYMPHOCYTES NFR BLD AUTO: 25.9 %
MCH RBC QN AUTO: 32 PG (ref 26.5–33)
MCHC RBC AUTO-ENTMCNC: 34.5 G/DL (ref 31.5–36.5)
MCV RBC AUTO: 93 FL (ref 78–100)
MONOCYTES # BLD AUTO: 0.7 10E9/L (ref 0–1.3)
MONOCYTES NFR BLD AUTO: 15.4 %
NEUTROPHILS # BLD AUTO: 2.6 10E9/L (ref 1.6–8.3)
NEUTROPHILS NFR BLD AUTO: 55.3 %
NRBC # BLD AUTO: 0 10*3/UL
NRBC BLD AUTO-RTO: 0 /100
PLATELET # BLD AUTO: 194 10E9/L (ref 150–450)
POTASSIUM SERPL-SCNC: 3.9 MMOL/L (ref 3.4–5.3)
PROT SERPL-MCNC: 8.1 G/DL (ref 6.8–8.8)
RBC # BLD AUTO: 3.72 10E12/L (ref 3.8–5.2)
SODIUM SERPL-SCNC: 135 MMOL/L (ref 133–144)
WBC # BLD AUTO: 4.7 10E9/L (ref 4–11)

## 2020-06-11 PROCEDURE — 96401 CHEMO ANTI-NEOPL SQ/IM: CPT

## 2020-06-11 PROCEDURE — G0463 HOSPITAL OUTPT CLINIC VISIT: HCPCS | Mod: 25

## 2020-06-11 PROCEDURE — 36415 COLL VENOUS BLD VENIPUNCTURE: CPT | Mod: ZL | Performed by: NURSE PRACTITIONER

## 2020-06-11 PROCEDURE — G0463 HOSPITAL OUTPT CLINIC VISIT: HCPCS

## 2020-06-11 PROCEDURE — 99213 OFFICE O/P EST LOW 20 MIN: CPT | Mod: CR | Performed by: NURSE PRACTITIONER

## 2020-06-11 PROCEDURE — 85025 COMPLETE CBC W/AUTO DIFF WBC: CPT | Mod: ZL | Performed by: NURSE PRACTITIONER

## 2020-06-11 PROCEDURE — 80053 COMPREHEN METABOLIC PANEL: CPT | Mod: ZL | Performed by: NURSE PRACTITIONER

## 2020-06-11 PROCEDURE — 25000128 H RX IP 250 OP 636: Performed by: NURSE PRACTITIONER

## 2020-06-11 RX ORDER — ONDANSETRON 4 MG/1
8 TABLET, FILM COATED ORAL ONCE
Status: COMPLETED | OUTPATIENT
Start: 2020-06-11 | End: 2020-06-11

## 2020-06-11 RX ADMIN — ONDANSETRON HYDROCHLORIDE 8 MG: 4 TABLET, FILM COATED ORAL at 10:43

## 2020-06-11 RX ADMIN — BORTEZOMIB 2.7 MG: 3.5 INJECTION, POWDER, LYOPHILIZED, FOR SOLUTION INTRAVENOUS; SUBCUTANEOUS at 10:45

## 2020-06-11 ASSESSMENT — MIFFLIN-ST. JEOR: SCORE: 1231.13

## 2020-06-11 ASSESSMENT — PAIN SCALES - GENERAL: PAINLEVEL: NO PAIN (0)

## 2020-06-11 NOTE — PATIENT INSTRUCTIONS
We would like to see you back per your schedule.      When you are in need of a refill, please call your pharmacy and they will send us a request.     If you have any questions please call [phone]    Other instructions:  none

## 2020-06-11 NOTE — NURSING NOTE
"Chief Complaint   Patient presents with     RECHECK     Follow up Multiple myeloma        Initial /75   Pulse 92   Temp 97.8  F (36.6  C) (Tympanic)   Resp 18   Ht 1.6 m (5' 3\")   Wt 74.7 kg (164 lb 10.9 oz)   SpO2 97%   BMI 29.17 kg/m   Estimated body mass index is 29.17 kg/m  as calculated from the following:    Height as of this encounter: 1.6 m (5' 3\").    Weight as of this encounter: 74.7 kg (164 lb 10.9 oz).  Medication Reconciliation: complete.  Immunizations and advance directives status reviewed. Pain scale =0 , PHQ-2=1.            Vilma Chi LPN    "

## 2020-06-11 NOTE — PATIENT INSTRUCTIONS
We will see you back as planned. If you have any questions or concerns, we can be reached Monday through Friday 8am - 430pm at [phone] ([facility] [place]). If you have concerns related to a potential reaction/side effect after hours/weekends/holiday's, please seek emergent medical care.   We will see you back as planned. If you have any questions or concerns, we can be reached Monday through Friday 8am - 430pm at [phone] ([facility] [place]). If you have concerns related to a potential reaction/side effect after hours/weekends/holiday's, please seek emergent medical care.

## 2020-06-11 NOTE — PROGRESS NOTES
Patient is a 69 year old female her today for injection of Velcade per order of . Patient identified with two identifiers, order verified, and verbal consent for today's infusion obtained from patient. Written consent for treatment is on file and valid.    Recent lab values: WBC: 4.7, HGB: 11.9  PLT: 194 ANC: 2.6  Patient meets order parameters for today's treatment.    Velcade dose verified with Barron RICH RN, prior to release of drug.      Patient denies questions or concerns regarding injection and/or medication(s) being administered.    Velcade injected SQ into right arm at 45 degree angle per protocol rotating sites. Patient tolerated injection without incident, no signs or symptoms of adverse reaction noted. Patient denies pain or discomfort.     Covered with a sterile bandage. Pt instructed to leave bandage intact for a minimum of one hour, and to call with questions or concerns. Copy of appointments, discharge instructions, and after visit summary (AVS) provided to patient. Patient states understanding, discharged.

## 2020-06-11 NOTE — PATIENT INSTRUCTIONS
We will see you back as planned. If you have any questions or concerns, we can be reached Monday through Friday 8am - 430pm at [phone] ([facility] [place]). If you have concerns related to a potential reaction/side effect after hours/weekends/holiday's, please seek emergent medical care.

## 2020-06-18 ENCOUNTER — INFUSION THERAPY VISIT (OUTPATIENT)
Dept: INFUSION THERAPY | Facility: OTHER | Age: 70
End: 2020-06-18
Attending: INTERNAL MEDICINE
Payer: MEDICARE

## 2020-06-18 VITALS
SYSTOLIC BLOOD PRESSURE: 116 MMHG | RESPIRATION RATE: 16 BRPM | OXYGEN SATURATION: 92 % | HEART RATE: 74 BPM | BODY MASS INDEX: 29.02 KG/M2 | WEIGHT: 163.8 LBS | DIASTOLIC BLOOD PRESSURE: 77 MMHG | HEIGHT: 63 IN | TEMPERATURE: 98.3 F

## 2020-06-18 DIAGNOSIS — C90.00 MULTIPLE MYELOMA NOT HAVING ACHIEVED REMISSION (H): Primary | ICD-10-CM

## 2020-06-18 LAB
ALBUMIN SERPL-MCNC: 3.3 G/DL (ref 3.4–5)
ALP SERPL-CCNC: 103 U/L (ref 40–150)
ALT SERPL W P-5'-P-CCNC: 27 U/L (ref 0–50)
AST SERPL W P-5'-P-CCNC: 19 U/L (ref 0–45)
BASOPHILS # BLD AUTO: 0 10E9/L (ref 0–0.2)
BASOPHILS NFR BLD AUTO: 0 %
BILIRUB DIRECT SERPL-MCNC: <0.1 MG/DL (ref 0–0.2)
BILIRUB SERPL-MCNC: 0.4 MG/DL (ref 0.2–1.3)
DIFFERENTIAL METHOD BLD: ABNORMAL
EOSINOPHIL # BLD AUTO: 0.1 10E9/L (ref 0–0.7)
EOSINOPHIL NFR BLD AUTO: 1 %
ERYTHROCYTE [DISTWIDTH] IN BLOOD BY AUTOMATED COUNT: 14.6 % (ref 10–15)
HCT VFR BLD AUTO: 34.7 % (ref 35–47)
HGB BLD-MCNC: 11.7 G/DL (ref 11.7–15.7)
LYMPHOCYTES # BLD AUTO: 1.3 10E9/L (ref 0.8–5.3)
LYMPHOCYTES NFR BLD AUTO: 25 %
MCH RBC QN AUTO: 32 PG (ref 26.5–33)
MCHC RBC AUTO-ENTMCNC: 33.7 G/DL (ref 31.5–36.5)
MCV RBC AUTO: 95 FL (ref 78–100)
MONOCYTES # BLD AUTO: 0.5 10E9/L (ref 0–1.3)
MONOCYTES NFR BLD AUTO: 10 %
NEUTROPHILS # BLD AUTO: 3.2 10E9/L (ref 1.6–8.3)
NEUTROPHILS NFR BLD AUTO: 64 %
PLATELET # BLD AUTO: 214 10E9/L (ref 150–450)
PROT SERPL-MCNC: 8.1 G/DL (ref 6.8–8.8)
RBC # BLD AUTO: 3.66 10E12/L (ref 3.8–5.2)
WBC # BLD AUTO: 5 10E9/L (ref 4–11)

## 2020-06-18 PROCEDURE — 96401 CHEMO ANTI-NEOPL SQ/IM: CPT

## 2020-06-18 PROCEDURE — 85025 COMPLETE CBC W/AUTO DIFF WBC: CPT | Mod: ZL | Performed by: NURSE PRACTITIONER

## 2020-06-18 PROCEDURE — 36415 COLL VENOUS BLD VENIPUNCTURE: CPT | Mod: ZL | Performed by: NURSE PRACTITIONER

## 2020-06-18 PROCEDURE — 80076 HEPATIC FUNCTION PANEL: CPT | Mod: ZL | Performed by: NURSE PRACTITIONER

## 2020-06-18 PROCEDURE — 25000128 H RX IP 250 OP 636: Performed by: NURSE PRACTITIONER

## 2020-06-18 RX ORDER — ONDANSETRON 4 MG/1
8 TABLET, FILM COATED ORAL ONCE
Status: COMPLETED | OUTPATIENT
Start: 2020-06-18 | End: 2020-06-18

## 2020-06-18 RX ADMIN — ONDANSETRON HYDROCHLORIDE 8 MG: 4 TABLET, FILM COATED ORAL at 15:39

## 2020-06-18 RX ADMIN — BORTEZOMIB 2.7 MG: 3.5 INJECTION, POWDER, LYOPHILIZED, FOR SOLUTION INTRAVENOUS; SUBCUTANEOUS at 15:41

## 2020-06-18 ASSESSMENT — MIFFLIN-ST. JEOR: SCORE: 1227.13

## 2020-06-18 ASSESSMENT — PAIN SCALES - GENERAL: PAINLEVEL: NO PAIN (0)

## 2020-06-18 NOTE — PROGRESS NOTES
Patient is a 69 year old female here today for injection of Velcade per order of . Patient meets parameters for today's infusion. Patient identified with two identifiers, order verified, and verbal consent for today's infusion obtained from patient. Written consent for treatment is on file and valid.    Recent lab values: WBC: 5.0, HGB: 11.7, PLT: 214  ANC: 3.2. Patient meets order parameters for today's treatment.  Patient denies questions or concerns regarding injection and/or medication(s) being administered.  Velcade injected SQ into left upper outer arm at 45 degree angle per protocol rotating sites.     Injection administered per protocol. Patient tolerated infusion without incident, no signs or symptoms of adverse reaction noted. Patient denies pain or discomfort.     Covered with a sterile bandage. Pt instructed to leave bandage intact for a minimum of one hour, and to call with questions or concerns. Copy of appointments, discharge instructions, and after visit summary (AVS) provided to patient. Patient states understanding, discharged ambulatory.

## 2020-06-25 ENCOUNTER — INFUSION THERAPY VISIT (OUTPATIENT)
Dept: INFUSION THERAPY | Facility: OTHER | Age: 70
End: 2020-06-25
Attending: INTERNAL MEDICINE
Payer: MEDICARE

## 2020-06-25 VITALS
DIASTOLIC BLOOD PRESSURE: 86 MMHG | TEMPERATURE: 97.9 F | WEIGHT: 161.6 LBS | OXYGEN SATURATION: 98 % | HEART RATE: 87 BPM | SYSTOLIC BLOOD PRESSURE: 136 MMHG | RESPIRATION RATE: 18 BRPM | HEIGHT: 63 IN | BODY MASS INDEX: 28.63 KG/M2

## 2020-06-25 DIAGNOSIS — C90.00 MULTIPLE MYELOMA NOT HAVING ACHIEVED REMISSION (H): Primary | ICD-10-CM

## 2020-06-25 LAB
ALBUMIN SERPL-MCNC: 3.6 G/DL (ref 3.4–5)
ALP SERPL-CCNC: 102 U/L (ref 40–150)
ALT SERPL W P-5'-P-CCNC: 23 U/L (ref 0–50)
AST SERPL W P-5'-P-CCNC: 15 U/L (ref 0–45)
BASOPHILS # BLD AUTO: 0 10E9/L (ref 0–0.2)
BASOPHILS NFR BLD AUTO: 0.5 %
BILIRUB DIRECT SERPL-MCNC: <0.1 MG/DL (ref 0–0.2)
BILIRUB SERPL-MCNC: 0.5 MG/DL (ref 0.2–1.3)
DIFFERENTIAL METHOD BLD: ABNORMAL
EOSINOPHIL # BLD AUTO: 0.1 10E9/L (ref 0–0.7)
EOSINOPHIL NFR BLD AUTO: 1.8 %
ERYTHROCYTE [DISTWIDTH] IN BLOOD BY AUTOMATED COUNT: 14.4 % (ref 10–15)
HCT VFR BLD AUTO: 35.2 % (ref 35–47)
HGB BLD-MCNC: 12.1 G/DL (ref 11.7–15.7)
IMM GRANULOCYTES # BLD: 0.1 10E9/L (ref 0–0.4)
IMM GRANULOCYTES NFR BLD: 2.2 %
LYMPHOCYTES # BLD AUTO: 1.4 10E9/L (ref 0.8–5.3)
LYMPHOCYTES NFR BLD AUTO: 23.6 %
MCH RBC QN AUTO: 32 PG (ref 26.5–33)
MCHC RBC AUTO-ENTMCNC: 34.4 G/DL (ref 31.5–36.5)
MCV RBC AUTO: 93 FL (ref 78–100)
MONOCYTES # BLD AUTO: 1.1 10E9/L (ref 0–1.3)
MONOCYTES NFR BLD AUTO: 18.7 %
NEUTROPHILS # BLD AUTO: 3.2 10E9/L (ref 1.6–8.3)
NEUTROPHILS NFR BLD AUTO: 53.2 %
NRBC # BLD AUTO: 0 10*3/UL
NRBC BLD AUTO-RTO: 0 /100
PLATELET # BLD AUTO: 230 10E9/L (ref 150–450)
PROT SERPL-MCNC: 8.5 G/DL (ref 6.8–8.8)
RBC # BLD AUTO: 3.78 10E12/L (ref 3.8–5.2)
WBC # BLD AUTO: 6 10E9/L (ref 4–11)

## 2020-06-25 PROCEDURE — 36415 COLL VENOUS BLD VENIPUNCTURE: CPT | Mod: ZL | Performed by: NURSE PRACTITIONER

## 2020-06-25 PROCEDURE — 25000128 H RX IP 250 OP 636: Performed by: NURSE PRACTITIONER

## 2020-06-25 PROCEDURE — 80076 HEPATIC FUNCTION PANEL: CPT | Mod: ZL | Performed by: NURSE PRACTITIONER

## 2020-06-25 PROCEDURE — 96401 CHEMO ANTI-NEOPL SQ/IM: CPT

## 2020-06-25 PROCEDURE — 85025 COMPLETE CBC W/AUTO DIFF WBC: CPT | Mod: ZL | Performed by: NURSE PRACTITIONER

## 2020-06-25 RX ORDER — ONDANSETRON 4 MG/1
8 TABLET, FILM COATED ORAL ONCE
Status: COMPLETED | OUTPATIENT
Start: 2020-06-25 | End: 2020-06-25

## 2020-06-25 RX ADMIN — BORTEZOMIB 2.7 MG: 3.5 INJECTION, POWDER, LYOPHILIZED, FOR SOLUTION INTRAVENOUS; SUBCUTANEOUS at 13:55

## 2020-06-25 RX ADMIN — ONDANSETRON HYDROCHLORIDE 8 MG: 4 TABLET, FILM COATED ORAL at 13:35

## 2020-06-25 ASSESSMENT — MIFFLIN-ST. JEOR: SCORE: 1217

## 2020-06-25 NOTE — PATIENT INSTRUCTIONS
We will see you back as planned. If you have any questions or concerns, we can be reached Monday through Friday 8am - 430pm at [phone] ([facility] [place]). If you have concerns related to a potential reaction/side effect after hours/weekends/holiday's, please seek emergent medical care.

## 2020-07-02 ENCOUNTER — INFUSION THERAPY VISIT (OUTPATIENT)
Dept: INFUSION THERAPY | Facility: OTHER | Age: 70
End: 2020-07-02
Attending: INTERNAL MEDICINE
Payer: MEDICARE

## 2020-07-02 VITALS
SYSTOLIC BLOOD PRESSURE: 126 MMHG | TEMPERATURE: 97.6 F | HEART RATE: 76 BPM | WEIGHT: 163.14 LBS | BODY MASS INDEX: 28.91 KG/M2 | OXYGEN SATURATION: 96 % | DIASTOLIC BLOOD PRESSURE: 90 MMHG

## 2020-07-02 DIAGNOSIS — C90.00 MULTIPLE MYELOMA NOT HAVING ACHIEVED REMISSION (H): Primary | ICD-10-CM

## 2020-07-02 LAB
BASOPHILS # BLD AUTO: 0 10E9/L (ref 0–0.2)
BASOPHILS NFR BLD AUTO: 0.4 %
DIFFERENTIAL METHOD BLD: ABNORMAL
EOSINOPHIL # BLD AUTO: 0.1 10E9/L (ref 0–0.7)
EOSINOPHIL NFR BLD AUTO: 2.2 %
ERYTHROCYTE [DISTWIDTH] IN BLOOD BY AUTOMATED COUNT: 14.5 % (ref 10–15)
HCT VFR BLD AUTO: 32.5 % (ref 35–47)
HGB BLD-MCNC: 11.2 G/DL (ref 11.7–15.7)
IMM GRANULOCYTES # BLD: 0.1 10E9/L (ref 0–0.4)
IMM GRANULOCYTES NFR BLD: 1.8 %
LYMPHOCYTES # BLD AUTO: 1.3 10E9/L (ref 0.8–5.3)
LYMPHOCYTES NFR BLD AUTO: 24.5 %
MCH RBC QN AUTO: 32.1 PG (ref 26.5–33)
MCHC RBC AUTO-ENTMCNC: 34.5 G/DL (ref 31.5–36.5)
MCV RBC AUTO: 93 FL (ref 78–100)
MONOCYTES # BLD AUTO: 0.8 10E9/L (ref 0–1.3)
MONOCYTES NFR BLD AUTO: 15.2 %
NEUTROPHILS # BLD AUTO: 3.1 10E9/L (ref 1.6–8.3)
NEUTROPHILS NFR BLD AUTO: 55.9 %
NRBC # BLD AUTO: 0 10*3/UL
NRBC BLD AUTO-RTO: 0 /100
PLATELET # BLD AUTO: 216 10E9/L (ref 150–450)
RBC # BLD AUTO: 3.49 10E12/L (ref 3.8–5.2)
WBC # BLD AUTO: 5.5 10E9/L (ref 4–11)

## 2020-07-02 PROCEDURE — 82784 ASSAY IGA/IGD/IGG/IGM EACH: CPT | Mod: ZL | Performed by: NURSE PRACTITIONER

## 2020-07-02 PROCEDURE — 83883 ASSAY NEPHELOMETRY NOT SPEC: CPT | Mod: ZL | Performed by: NURSE PRACTITIONER

## 2020-07-02 PROCEDURE — 82232 ASSAY OF BETA-2 PROTEIN: CPT | Mod: ZL | Performed by: NURSE PRACTITIONER

## 2020-07-02 PROCEDURE — 85810 BLOOD VISCOSITY EXAMINATION: CPT | Mod: ZL | Performed by: NURSE PRACTITIONER

## 2020-07-02 PROCEDURE — 85025 COMPLETE CBC W/AUTO DIFF WBC: CPT | Mod: ZL | Performed by: NURSE PRACTITIONER

## 2020-07-02 PROCEDURE — 25000128 H RX IP 250 OP 636: Performed by: NURSE PRACTITIONER

## 2020-07-02 PROCEDURE — 96401 CHEMO ANTI-NEOPL SQ/IM: CPT

## 2020-07-02 PROCEDURE — 36415 COLL VENOUS BLD VENIPUNCTURE: CPT | Mod: ZL | Performed by: NURSE PRACTITIONER

## 2020-07-02 PROCEDURE — 84165 PROTEIN E-PHORESIS SERUM: CPT | Mod: ZL | Performed by: NURSE PRACTITIONER

## 2020-07-02 PROCEDURE — 00000402 ZZHCL STATISTIC TOTAL PROTEIN: Mod: ZL | Performed by: NURSE PRACTITIONER

## 2020-07-02 RX ORDER — ONDANSETRON 4 MG/1
8 TABLET, FILM COATED ORAL ONCE
Status: COMPLETED | OUTPATIENT
Start: 2020-07-02 | End: 2020-07-02

## 2020-07-02 RX ADMIN — BORTEZOMIB 2.7 MG: 3.5 INJECTION, POWDER, LYOPHILIZED, FOR SOLUTION INTRAVENOUS; SUBCUTANEOUS at 15:25

## 2020-07-02 RX ADMIN — ONDANSETRON HYDROCHLORIDE 8 MG: 4 TABLET, FILM COATED ORAL at 15:22

## 2020-07-02 ASSESSMENT — PAIN SCALES - GENERAL: PAINLEVEL: NO PAIN (0)

## 2020-07-02 NOTE — PATIENT INSTRUCTIONS
We will see you back as planned. If you have any questions or concerns, we can be reached Monday through Friday 8am - 430pm at [phone] ([facility] [place]). If you have concerns related to a potential reaction/side effect after hours/weekends/holiday's, please seek emergent medical care.

## 2020-07-02 NOTE — PROGRESS NOTES
Patient is a 69 year old female here today for injection of Velcade per order of . Patient identified with two identifiers, order verified, and verbal consent for today's infusion obtained from patient. Written consent for treatment is on file and valid.    Recent lab values: WBC: 5.5  HGB: 11.2, PLT: 216   ANC: 3.1.   Patient meets order parameters for today's treatment.    Velcade dose verified with Yancy KHANNA RN, prior to release of drug.      Patient denies questions or concerns regarding injection and/or medication(s) being administered.    Velcade injected SQ into Left back arm  at 45 degree angle per protocol rotating sites. Patient tolerated injection without incident, no signs or symptoms of adverse reaction noted. Patient denies pain or discomfort.     Covered with a sterile bandage. Pt instructed to leave bandage intact for a minimum of one hour, and to call with questions or concerns. Patient states understanding, discharged.

## 2020-07-05 RX ORDER — ONDANSETRON 4 MG/1
8 TABLET, FILM COATED ORAL ONCE
Status: CANCELLED
Start: 2020-07-30

## 2020-07-05 RX ORDER — DIPHENHYDRAMINE HYDROCHLORIDE 50 MG/ML
50 INJECTION INTRAMUSCULAR; INTRAVENOUS
Status: CANCELLED
Start: 2020-07-09

## 2020-07-05 RX ORDER — DIPHENHYDRAMINE HYDROCHLORIDE 50 MG/ML
50 INJECTION INTRAMUSCULAR; INTRAVENOUS
Status: CANCELLED
Start: 2020-07-23

## 2020-07-05 RX ORDER — EPINEPHRINE 1 MG/ML
0.3 INJECTION, SOLUTION, CONCENTRATE INTRAVENOUS EVERY 5 MIN PRN
Status: CANCELLED | OUTPATIENT
Start: 2020-07-16

## 2020-07-05 RX ORDER — EPINEPHRINE 1 MG/ML
0.3 INJECTION, SOLUTION, CONCENTRATE INTRAVENOUS EVERY 5 MIN PRN
Status: CANCELLED | OUTPATIENT
Start: 2020-07-09

## 2020-07-05 RX ORDER — SODIUM CHLORIDE 9 MG/ML
1000 INJECTION, SOLUTION INTRAVENOUS CONTINUOUS PRN
Status: CANCELLED
Start: 2020-07-30

## 2020-07-05 RX ORDER — ALBUTEROL SULFATE 90 UG/1
1-2 AEROSOL, METERED RESPIRATORY (INHALATION)
Status: CANCELLED
Start: 2020-07-30

## 2020-07-05 RX ORDER — METHYLPREDNISOLONE SODIUM SUCCINATE 125 MG/2ML
125 INJECTION, POWDER, LYOPHILIZED, FOR SOLUTION INTRAMUSCULAR; INTRAVENOUS
Status: CANCELLED
Start: 2020-07-30

## 2020-07-05 RX ORDER — EPINEPHRINE 1 MG/ML
0.3 INJECTION, SOLUTION, CONCENTRATE INTRAVENOUS EVERY 5 MIN PRN
Status: CANCELLED | OUTPATIENT
Start: 2020-07-23

## 2020-07-05 RX ORDER — LORAZEPAM 2 MG/ML
0.5 INJECTION INTRAMUSCULAR EVERY 4 HOURS PRN
Status: CANCELLED
Start: 2020-07-16

## 2020-07-05 RX ORDER — ALBUTEROL SULFATE 90 UG/1
1-2 AEROSOL, METERED RESPIRATORY (INHALATION)
Status: CANCELLED
Start: 2020-07-16

## 2020-07-05 RX ORDER — MEPERIDINE HYDROCHLORIDE 25 MG/ML
25 INJECTION INTRAMUSCULAR; INTRAVENOUS; SUBCUTANEOUS EVERY 30 MIN PRN
Status: CANCELLED | OUTPATIENT
Start: 2020-07-23

## 2020-07-05 RX ORDER — ONDANSETRON 4 MG/1
8 TABLET, FILM COATED ORAL ONCE
Status: CANCELLED
Start: 2020-07-23

## 2020-07-05 RX ORDER — ALBUTEROL SULFATE 0.83 MG/ML
2.5 SOLUTION RESPIRATORY (INHALATION)
Status: CANCELLED | OUTPATIENT
Start: 2020-07-16

## 2020-07-05 RX ORDER — METHYLPREDNISOLONE SODIUM SUCCINATE 125 MG/2ML
125 INJECTION, POWDER, LYOPHILIZED, FOR SOLUTION INTRAMUSCULAR; INTRAVENOUS
Status: CANCELLED
Start: 2020-07-09

## 2020-07-05 RX ORDER — SODIUM CHLORIDE 9 MG/ML
1000 INJECTION, SOLUTION INTRAVENOUS CONTINUOUS PRN
Status: CANCELLED
Start: 2020-07-09

## 2020-07-05 RX ORDER — EPINEPHRINE 0.3 MG/.3ML
0.3 INJECTION SUBCUTANEOUS EVERY 5 MIN PRN
Status: CANCELLED | OUTPATIENT
Start: 2020-07-16

## 2020-07-05 RX ORDER — ALBUTEROL SULFATE 0.83 MG/ML
2.5 SOLUTION RESPIRATORY (INHALATION)
Status: CANCELLED | OUTPATIENT
Start: 2020-07-23

## 2020-07-05 RX ORDER — SODIUM CHLORIDE 9 MG/ML
1000 INJECTION, SOLUTION INTRAVENOUS CONTINUOUS PRN
Status: CANCELLED
Start: 2020-07-23

## 2020-07-05 RX ORDER — EPINEPHRINE 0.3 MG/.3ML
0.3 INJECTION SUBCUTANEOUS EVERY 5 MIN PRN
Status: CANCELLED | OUTPATIENT
Start: 2020-07-23

## 2020-07-05 RX ORDER — ALBUTEROL SULFATE 0.83 MG/ML
2.5 SOLUTION RESPIRATORY (INHALATION)
Status: CANCELLED | OUTPATIENT
Start: 2020-07-09

## 2020-07-05 RX ORDER — MEPERIDINE HYDROCHLORIDE 25 MG/ML
25 INJECTION INTRAMUSCULAR; INTRAVENOUS; SUBCUTANEOUS EVERY 30 MIN PRN
Status: CANCELLED | OUTPATIENT
Start: 2020-07-09

## 2020-07-05 RX ORDER — METHYLPREDNISOLONE SODIUM SUCCINATE 125 MG/2ML
125 INJECTION, POWDER, LYOPHILIZED, FOR SOLUTION INTRAMUSCULAR; INTRAVENOUS
Status: CANCELLED
Start: 2020-07-16

## 2020-07-05 RX ORDER — EPINEPHRINE 0.3 MG/.3ML
0.3 INJECTION SUBCUTANEOUS EVERY 5 MIN PRN
Status: CANCELLED | OUTPATIENT
Start: 2020-07-09

## 2020-07-05 RX ORDER — NALOXONE HYDROCHLORIDE 0.4 MG/ML
.1-.4 INJECTION, SOLUTION INTRAMUSCULAR; INTRAVENOUS; SUBCUTANEOUS
Status: CANCELLED | OUTPATIENT
Start: 2020-07-23

## 2020-07-05 RX ORDER — ALBUTEROL SULFATE 0.83 MG/ML
2.5 SOLUTION RESPIRATORY (INHALATION)
Status: CANCELLED | OUTPATIENT
Start: 2020-07-30

## 2020-07-05 RX ORDER — LORAZEPAM 2 MG/ML
0.5 INJECTION INTRAMUSCULAR EVERY 4 HOURS PRN
Status: CANCELLED
Start: 2020-07-30

## 2020-07-05 RX ORDER — ONDANSETRON 4 MG/1
8 TABLET, FILM COATED ORAL ONCE
Status: CANCELLED
Start: 2020-07-16

## 2020-07-05 RX ORDER — DIPHENHYDRAMINE HYDROCHLORIDE 50 MG/ML
50 INJECTION INTRAMUSCULAR; INTRAVENOUS
Status: CANCELLED
Start: 2020-07-30

## 2020-07-05 RX ORDER — MEPERIDINE HYDROCHLORIDE 25 MG/ML
25 INJECTION INTRAMUSCULAR; INTRAVENOUS; SUBCUTANEOUS EVERY 30 MIN PRN
Status: CANCELLED | OUTPATIENT
Start: 2020-07-16

## 2020-07-05 RX ORDER — NALOXONE HYDROCHLORIDE 0.4 MG/ML
.1-.4 INJECTION, SOLUTION INTRAMUSCULAR; INTRAVENOUS; SUBCUTANEOUS
Status: CANCELLED | OUTPATIENT
Start: 2020-07-09

## 2020-07-05 RX ORDER — MEPERIDINE HYDROCHLORIDE 25 MG/ML
25 INJECTION INTRAMUSCULAR; INTRAVENOUS; SUBCUTANEOUS EVERY 30 MIN PRN
Status: CANCELLED | OUTPATIENT
Start: 2020-07-30

## 2020-07-05 RX ORDER — METHYLPREDNISOLONE SODIUM SUCCINATE 125 MG/2ML
125 INJECTION, POWDER, LYOPHILIZED, FOR SOLUTION INTRAMUSCULAR; INTRAVENOUS
Status: CANCELLED
Start: 2020-07-23

## 2020-07-05 RX ORDER — EPINEPHRINE 0.3 MG/.3ML
0.3 INJECTION SUBCUTANEOUS EVERY 5 MIN PRN
Status: CANCELLED | OUTPATIENT
Start: 2020-07-30

## 2020-07-05 RX ORDER — SODIUM CHLORIDE 9 MG/ML
1000 INJECTION, SOLUTION INTRAVENOUS CONTINUOUS PRN
Status: CANCELLED
Start: 2020-07-16

## 2020-07-05 RX ORDER — DIPHENHYDRAMINE HYDROCHLORIDE 50 MG/ML
50 INJECTION INTRAMUSCULAR; INTRAVENOUS
Status: CANCELLED
Start: 2020-07-16

## 2020-07-05 RX ORDER — ONDANSETRON 4 MG/1
8 TABLET, FILM COATED ORAL ONCE
Status: CANCELLED | OUTPATIENT
Start: 2020-07-09

## 2020-07-05 RX ORDER — EPINEPHRINE 1 MG/ML
0.3 INJECTION, SOLUTION, CONCENTRATE INTRAVENOUS EVERY 5 MIN PRN
Status: CANCELLED | OUTPATIENT
Start: 2020-07-30

## 2020-07-05 RX ORDER — ALBUTEROL SULFATE 90 UG/1
1-2 AEROSOL, METERED RESPIRATORY (INHALATION)
Status: CANCELLED
Start: 2020-07-09

## 2020-07-05 RX ORDER — ALBUTEROL SULFATE 90 UG/1
1-2 AEROSOL, METERED RESPIRATORY (INHALATION)
Status: CANCELLED
Start: 2020-07-23

## 2020-07-05 RX ORDER — NALOXONE HYDROCHLORIDE 0.4 MG/ML
.1-.4 INJECTION, SOLUTION INTRAMUSCULAR; INTRAVENOUS; SUBCUTANEOUS
Status: CANCELLED | OUTPATIENT
Start: 2020-07-30

## 2020-07-05 RX ORDER — NALOXONE HYDROCHLORIDE 0.4 MG/ML
.1-.4 INJECTION, SOLUTION INTRAMUSCULAR; INTRAVENOUS; SUBCUTANEOUS
Status: CANCELLED | OUTPATIENT
Start: 2020-07-16

## 2020-07-05 RX ORDER — LORAZEPAM 2 MG/ML
0.5 INJECTION INTRAMUSCULAR EVERY 4 HOURS PRN
Status: CANCELLED
Start: 2020-07-09

## 2020-07-05 RX ORDER — LORAZEPAM 2 MG/ML
0.5 INJECTION INTRAMUSCULAR EVERY 4 HOURS PRN
Status: CANCELLED
Start: 2020-07-23

## 2020-07-06 LAB
ALBUMIN SERPL ELPH-MCNC: 4.1 G/DL (ref 3.7–5.1)
ALPHA1 GLOB SERPL ELPH-MCNC: 0.3 G/DL (ref 0.2–0.4)
ALPHA2 GLOB SERPL ELPH-MCNC: 0.7 G/DL (ref 0.5–0.9)
B-GLOBULIN SERPL ELPH-MCNC: 0.6 G/DL (ref 0.6–1)
GAMMA GLOB SERPL ELPH-MCNC: 2.1 G/DL (ref 0.7–1.6)
M PROTEIN SERPL ELPH-MCNC: 1.9 G/DL
PROT PATTERN SERPL ELPH-IMP: ABNORMAL
VISC SER: 1.6 CP (ref 1.4–1.8)

## 2020-07-06 NOTE — PROGRESS NOTES
Oncology Follow-up Visit:  July 9, 2020    Reason for Visit:  Patient presents with:  RECHECK: multiple myeloma     Nursing Note and documentation reviewed: yes    HPI:  This is a 69-year-old female patient who presents to the oncology clinic today for evaluation prior to receiving cycle 11 chemotherapy for Stage II-RISS IgG multiple myeloma diagnosed June 2019.  She progressed on Velcade and dexamethasone and is now receiving CyBorD with decreased dose of cytoxan related to LFT elevation.      She presents to the clinic today stating she is overall doing pretty well.  She does have some increased fatigue but is acting as a caregiver also for her  at this point.  She states she feels she is doing okay from an anxiety and depression standpoint and she continues on sertraline 25 mg daily.  She will be seeing her PCP next week for follow-up in regards to this.  She continues to get palpitations after the Velcade and this is been going on for quite some time and denies any other times of palpitations.  Essentially, she offers no new complaints.    Oncologic History:     12/31/2018   patient presented to the emergency room with vertigo and fatigue.  CT scan of the head was negative and subsequent stress test was negative.  5/3/2019  She was seen by her PCP who ordered lab work and noted a total protein of 12.9.  SPEP at that time showed an M spike of 6.2 with a large monoclonal protein seen in the gamma fraction.  Urine immunofixation showed a possible small protein band in the gamma fraction  5/31/2019  she was evaluated by Dr. Walker with Medical Oncology with plan to rule out myeloma and obtain bone marrow aspiration biopsy as well as a metastatic bone survey along with additional labwork  6/18/2019 she underwent bone marrow aspiration and biopsy  6/24/2019  She was seen again by Dr. Walker and CBC showed a hemoglobin of 9.3, M spike 7.3 with monoclonal IgG immunoglobulin of kappa light chain type; serum  viscosity was 2.9; quantitative immunoglobulins showed an IgG of 8160, beta-2 microglobulin was 5.8, BUN was 21 with creatinine is 0.8 and total protein was 13.7.  Quantitative kappa/lambda free light chains showed an elevated ratio of 17.0 bone marrow aspiration biopsy showed plasma cell myeloma with approximately 80% plasma cells.  Immunofixation showed IgG kappa and flow cytometry revealed kappa monotypic plasma cells consistent with clonal plasma cell neoplasm and FISH panel was pending at that time.  It was felt she had at least stage II disease based on her beta-2 microglobulin and anemia.  Plan was to treat with Velcade 1.3 mg per metered squared days 1, 4, 8 and 11/Decadron 40 mg on days 1, 8 and 15 initiation of Revlimid with the second cycle at 25 mg daily days 1 through 14.  Plan was to also obtain an MRI of the lumbar spine to rule out lytic lesion at L3.  She was initiated on Zovirax 400 mg p.o. twice daily.  6/25/2019  1st cycle of chemotherapy initiated  7/1/2019 note in chart regarding patient's large co-pay for the Revlimid and no plan at this point to initiate Revlimid and treat only with Velcade and Decadron per Dr. Walker  7/11/2019  MRI lumbar spine shows a pathologic superior endplate compression fracture at L3 without evidence of retropulsed fragment and innumerable enhancing lesions throughout the lumbar spine consistent with history of multiple myeloma.  9/10/2019  Increasing M-spike and kappa lambda ratio  10/1/2019  Initiation of CyBorD     Current Chemo Regime/TX:  Velcade 1.5mg.m2/cyclophosphamide 150mg every 7 days on days 1,8,15 and 22/decadron 40mg days 1,8,15,22       **Zometa 4mg every 3 months  Current Cycle: 11  # of completed cycles:  10     Previous treatment:   Velcade 1.3 mg/m2 days 1, 4, 8 and 11 with Decadron 40 mg days 1, 8 and 15 x 4 cycles     Past Medical History:   Diagnosis Date     Arthritis      Depressive disorder      Essential hypertension 10/1/2015     Major  "depressive disorder, recurrent episode, mild (H) 10/1/2015     Mixed hyperlipidemia 10/1/2015     Multiple myeloma not having achieved remission (H) 6/24/2019     Other specified disorders of bladder 07/09/2012    Irritable Bladder     Seasonal allergies 10/1/2015     Unspecified essential hypertension 03/19/2007     Unspecified sinusitis (chronic) 09/05/2007       Past Surgical History:   Procedure Laterality Date     APPENDECTOMY       BONE MARROW BIOPSY, BONE SPECIMEN, NEEDLE/TROCAR N/A 6/18/2019    Procedure: BONE MARROW BIOPSY;  Surgeon: Maciej Sanz MD;  Location: [facility]     CHOLECYSTECTOMY       COLONOSCOPY  07-    repeat 10 years     COLONOSCOPY N/A 12/30/2016    Procedure: COLONOSCOPY;  Surgeon: Bhaskar Franklin DO;  Location: [facility]     SINUS SURGERY       TUBAL STERILIZATION         Family History   Problem Relation Age of Onset     Breast Cancer Mother 66        Cause of Death     Parkinsonism Father         \"Possible\"     Coronary Artery Disease Father      Pacemaker Father      Thyroid Disease Daughter      Diabetes No family hx of      Hypertension No family hx of      Hyperlipidemia No family hx of      Cerebrovascular Disease No family hx of      Colon Cancer No family hx of      Prostate Cancer No family hx of      Genetic Disorder No family hx of      Asthma No family hx of      Anesthesia Reaction No family hx of        Social History     Socioeconomic History     Marital status:      Spouse name: Not on file     Number of children: Not on file     Years of education: Not on file     Highest education level: Not on file   Occupational History     Occupation: Financial     Comment:  - (FT)   Social Needs     Financial resource strain: Not on file     Food insecurity     Worry: Not on file     Inability: Not on file     Transportation needs     Medical: Not on file     Non-medical: Not on file   Tobacco Use     Smoking status: Never Smoker     Smokeless tobacco: " Never Used     Tobacco comment: Tried to Quit (YES); QUIT in 1971; Passive Exposure (NO)   Substance and Sexual Activity     Alcohol use: Yes     Comment: RARELY     Drug use: No     Sexual activity: Yes     Partners: Male     Birth control/protection: None   Lifestyle     Physical activity     Days per week: Not on file     Minutes per session: Not on file     Stress: Not on file   Relationships     Social connections     Talks on phone: Not on file     Gets together: Not on file     Attends Evangelical service: Not on file     Active member of club or organization: Not on file     Attends meetings of clubs or organizations: Not on file     Relationship status: Not on file     Intimate partner violence     Fear of current or ex partner: Not on file     Emotionally abused: Not on file     Physically abused: Not on file     Forced sexual activity: Not on file   Other Topics Concern      Service Not Asked     Blood Transfusions Not Asked     Caffeine Concern Yes     Comment: Coffee >6 cups daily     Occupational Exposure Not Asked     Hobby Hazards Not Asked     Sleep Concern Not Asked     Stress Concern Not Asked     Weight Concern Not Asked     Special Diet Not Asked     Back Care Not Asked     Exercise Not Asked     Bike Helmet Not Asked     Seat Belt Not Asked     Self-Exams Not Asked     Parent/sibling w/ CABG, MI or angioplasty before 65F 55M? No   Social History Narrative     Not on file       Current Outpatient Medications   Medication     acyclovir (ZOVIRAX) 400 MG tablet     aspirin (ASA) 81 MG chewable tablet     atorvastatin (LIPITOR) 10 MG tablet     Bortezomib (VELCADE IJ)     cyclophosphamide 50 MG PO capsule     dexamethasone (DECADRON) 4 MG tablet     EPINEPHrine (EPIPEN) 0.3 MG/0.3ML injection     fluticasone (FLONASE) 50 MCG/ACT nasal spray     LORazepam (ATIVAN) 0.5 MG tablet     losartan (COZAAR) 50 MG tablet     sertraline (ZOLOFT) 50 MG tablet     ondansetron (ZOFRAN) 8 MG tablet      "prochlorperazine (COMPAZINE) 10 MG tablet     No current facility-administered medications for this visit.         Allergies   Allergen Reactions     Lisinopril Cough     Phenylephrine Hcl Other (See Comments)     **Entex  HEADACHE (SEVERE)     Phenylpropanolamine Other (See Comments)     **Entex  HEADACHE (SEVERE)     Pseudoephedrine Tannate Other (See Comments)     **Entex  HEADACHE (SEVERE)     Levofloxacin Rash     **Levaquin     Moxifloxacin Hcl [Avelox] Rash       Review Of Systems:  Constitutional:    denies fever, weight changes, chills, and night sweats.  Eyes:    denies blurred or double vision  Ears/Nose/Throat:   denies ear pain, difficulty swallowing; chronic nasal congestion  Respiratory:   denies shortness of breath, cough  Skin:   denies rash, lesions  Cardiovascular:   denies chest pain, edema; gets palpitations after the velcade  Gastrointestinal:   denies abdominal pain, bloating, nausea, early satiety; no change in bowel habits or blood in stool  Genitourinary:   denies difficulty with urination, blood in urine  Musculoskeletal:    denies new muscle pain, bone pain  Neurologic:   denies lightheadedness, denies routine headaches, denies numbness or tingling  Psychiatric:   denies anxiety, depression-thinks she is doing okay-on zoloft  Hematologic/Lymphatic/Immunologic:   denies easy bruising, easy bleeding, lumps or bumps noted  Endocrine:   Denies increased thirst      ECOG Performance Status: 1    Physical Exam:  /80   Pulse 76   Temp 96.4  F (35.8  C) (Tympanic)   Ht 1.6 m (5' 2.99\")   Wt 74.7 kg (164 lb 10.9 oz)   SpO2 96%   BMI 29.18 kg/m      GENERAL APPEARANCE: Healthy, alert and in no acute distress.  HEENT: Normocephalic, Sclerae anicteric. Oropharynx without ulcers, lesions, or thrush.  NECK:   No asymmetry or masses, no thyromegaly.  LYMPHATICS: No palpable cervical, supraclavicular, axillary, or inguinal nodes   RESP: Lungs clear to auscultation bilaterally, respirations " regular and easy  CARDIOVASCULAR: Regular rate and rhythm. Normal S1, S2; no murmur, gallop, or rub.  ABDOMEN: Soft, nontender. Bowel sounds auscultated all 4 quadrants. No palpable organomegaly or masses.  MUSCULOSKELETAL: Extremities without gross deformities noted. No edema of bilateral lower extremities.  NEURO: Alert and oriented x 3.  Gait steady.  PSYCHIATRIC: Mentation and affect appear normal.  Mood appropriate.    Laboratory:  Results for orders placed or performed in visit on 07/09/20   Comprehensive metabolic panel     Status: Abnormal   Result Value Ref Range    Sodium 131 (L) 133 - 144 mmol/L    Potassium 3.8 3.4 - 5.3 mmol/L    Chloride 102 94 - 109 mmol/L    Carbon Dioxide 26 20 - 32 mmol/L    Anion Gap 3 3 - 14 mmol/L    Glucose 110 (H) 70 - 99 mg/dL    Urea Nitrogen 14 7 - 30 mg/dL    Creatinine 0.63 0.52 - 1.04 mg/dL    GFR Estimate 90 >60 mL/min/[1.73_m2]    GFR Estimate If Black >90 >60 mL/min/[1.73_m2]    Calcium 8.7 8.5 - 10.1 mg/dL    Bilirubin Total 0.5 0.2 - 1.3 mg/dL    Albumin 3.5 3.4 - 5.0 g/dL    Protein Total 8.2 6.8 - 8.8 g/dL    Alkaline Phosphatase 104 40 - 150 U/L    ALT 20 0 - 50 U/L    AST 14 0 - 45 U/L   CBC with platelets differential     Status: Abnormal   Result Value Ref Range    WBC 4.9 4.0 - 11.0 10e9/L    RBC Count 3.71 (L) 3.8 - 5.2 10e12/L    Hemoglobin 11.8 11.7 - 15.7 g/dL    Hematocrit 34.2 (L) 35.0 - 47.0 %    MCV 92 78 - 100 fl    MCH 31.8 26.5 - 33.0 pg    MCHC 34.5 31.5 - 36.5 g/dL    RDW 14.5 10.0 - 15.0 %    Platelet Count 196 150 - 450 10e9/L    Diff Method Automated Method     % Neutrophils 52.8 %    % Lymphocytes 21.1 %    % Monocytes 20.3 %    % Eosinophils 3.1 %    % Basophils 0.4 %    % Immature Granulocytes 2.3 %    Nucleated RBCs 0 0 /100    Absolute Neutrophil 2.6 1.6 - 8.3 10e9/L    Absolute Lymphocytes 1.0 0.8 - 5.3 10e9/L    Absolute Monocytes 1.0 0.0 - 1.3 10e9/L    Absolute Eosinophils 0.2 0.0 - 0.7 10e9/L    Absolute Basophils 0.0 0.0 - 0.2  10e9/L    Abs Immature Granulocytes 0.1 0 - 0.4 10e9/L    Absolute Nucleated RBC 0.0        Imaging Studies:  None completed for today's visit       ASSESSMENT/PLAN:    #1 Multiple myeloma:   IgG kappa multiple myeloma with 80% involvement of the bone marrow diagnosed June 2019 initially treated with Velcade and Decadron and received 4 cycles with increasing M-spike and kappa/lambda ratio.  Treatment changed to CyBorD on 10/1/2019 with elevating LFT's felt to be related to the Cytoxan so this was decreased with cycle 3 day 8.  M-spike down again a bit at 1.9. Will continue the same and she will initiate cycle 11 today and follow-up prior to cycle 12-day 1 with myeloma labs.       #2  Bone lesions:  Will receive her Zometa infusion this month.    I encouraged patient to call with any questions or concerns.       Barbie Quintana, NP  APRN, FNP-BC, AOCNP

## 2020-07-07 LAB
B2 MICROGLOB SERPL-MCNC: 3.3 MG/L
IGA SERPL-MCNC: 10 MG/DL (ref 84–499)
IGG SERPL-MCNC: 2324 MG/DL (ref 610–1616)
IGM SERPL-MCNC: <10 MG/DL (ref 35–242)
KAPPA LC UR-MCNC: 0.63 MG/DL (ref 0.33–1.94)
KAPPA LC/LAMBDA SER: 2.74 {RATIO} (ref 0.26–1.65)
LAMBDA LC SERPL-MCNC: 0.23 MG/DL (ref 0.57–2.63)

## 2020-07-09 ENCOUNTER — ONCOLOGY VISIT (OUTPATIENT)
Dept: ONCOLOGY | Facility: OTHER | Age: 70
End: 2020-07-09
Attending: NURSE PRACTITIONER
Payer: MEDICARE

## 2020-07-09 ENCOUNTER — INFUSION THERAPY VISIT (OUTPATIENT)
Dept: INFUSION THERAPY | Facility: OTHER | Age: 70
End: 2020-07-09
Attending: INTERNAL MEDICINE
Payer: MEDICARE

## 2020-07-09 VITALS
HEART RATE: 76 BPM | OXYGEN SATURATION: 96 % | DIASTOLIC BLOOD PRESSURE: 80 MMHG | HEIGHT: 63 IN | TEMPERATURE: 96.4 F | WEIGHT: 164.68 LBS | SYSTOLIC BLOOD PRESSURE: 124 MMHG | BODY MASS INDEX: 29.18 KG/M2 | RESPIRATION RATE: 16 BRPM

## 2020-07-09 VITALS
DIASTOLIC BLOOD PRESSURE: 80 MMHG | TEMPERATURE: 96.4 F | OXYGEN SATURATION: 96 % | SYSTOLIC BLOOD PRESSURE: 124 MMHG | HEART RATE: 76 BPM | HEIGHT: 63 IN | BODY MASS INDEX: 29.18 KG/M2 | WEIGHT: 164.68 LBS

## 2020-07-09 DIAGNOSIS — C90.00 MULTIPLE MYELOMA NOT HAVING ACHIEVED REMISSION (H): Primary | ICD-10-CM

## 2020-07-09 DIAGNOSIS — M89.9 BONE LESION: ICD-10-CM

## 2020-07-09 LAB
ALBUMIN SERPL-MCNC: 3.5 G/DL (ref 3.4–5)
ALP SERPL-CCNC: 104 U/L (ref 40–150)
ALT SERPL W P-5'-P-CCNC: 20 U/L (ref 0–50)
ANION GAP SERPL CALCULATED.3IONS-SCNC: 3 MMOL/L (ref 3–14)
AST SERPL W P-5'-P-CCNC: 14 U/L (ref 0–45)
BASOPHILS # BLD AUTO: 0 10E9/L (ref 0–0.2)
BASOPHILS NFR BLD AUTO: 0.4 %
BILIRUB SERPL-MCNC: 0.5 MG/DL (ref 0.2–1.3)
BUN SERPL-MCNC: 14 MG/DL (ref 7–30)
CALCIUM SERPL-MCNC: 8.7 MG/DL (ref 8.5–10.1)
CHLORIDE SERPL-SCNC: 102 MMOL/L (ref 94–109)
CO2 SERPL-SCNC: 26 MMOL/L (ref 20–32)
CREAT SERPL-MCNC: 0.63 MG/DL (ref 0.52–1.04)
DIFFERENTIAL METHOD BLD: ABNORMAL
EOSINOPHIL # BLD AUTO: 0.2 10E9/L (ref 0–0.7)
EOSINOPHIL NFR BLD AUTO: 3.1 %
ERYTHROCYTE [DISTWIDTH] IN BLOOD BY AUTOMATED COUNT: 14.5 % (ref 10–15)
GFR SERPL CREATININE-BSD FRML MDRD: 90 ML/MIN/{1.73_M2}
GLUCOSE SERPL-MCNC: 110 MG/DL (ref 70–99)
HCT VFR BLD AUTO: 34.2 % (ref 35–47)
HGB BLD-MCNC: 11.8 G/DL (ref 11.7–15.7)
IMM GRANULOCYTES # BLD: 0.1 10E9/L (ref 0–0.4)
IMM GRANULOCYTES NFR BLD: 2.3 %
LYMPHOCYTES # BLD AUTO: 1 10E9/L (ref 0.8–5.3)
LYMPHOCYTES NFR BLD AUTO: 21.1 %
MCH RBC QN AUTO: 31.8 PG (ref 26.5–33)
MCHC RBC AUTO-ENTMCNC: 34.5 G/DL (ref 31.5–36.5)
MCV RBC AUTO: 92 FL (ref 78–100)
MONOCYTES # BLD AUTO: 1 10E9/L (ref 0–1.3)
MONOCYTES NFR BLD AUTO: 20.3 %
NEUTROPHILS # BLD AUTO: 2.6 10E9/L (ref 1.6–8.3)
NEUTROPHILS NFR BLD AUTO: 52.8 %
NRBC # BLD AUTO: 0 10*3/UL
NRBC BLD AUTO-RTO: 0 /100
PLATELET # BLD AUTO: 196 10E9/L (ref 150–450)
POTASSIUM SERPL-SCNC: 3.8 MMOL/L (ref 3.4–5.3)
PROT SERPL-MCNC: 8.2 G/DL (ref 6.8–8.8)
RBC # BLD AUTO: 3.71 10E12/L (ref 3.8–5.2)
SODIUM SERPL-SCNC: 131 MMOL/L (ref 133–144)
WBC # BLD AUTO: 4.9 10E9/L (ref 4–11)

## 2020-07-09 PROCEDURE — 96401 CHEMO ANTI-NEOPL SQ/IM: CPT

## 2020-07-09 PROCEDURE — 25000128 H RX IP 250 OP 636: Mod: JW | Performed by: NURSE PRACTITIONER

## 2020-07-09 PROCEDURE — 85025 COMPLETE CBC W/AUTO DIFF WBC: CPT | Mod: ZL | Performed by: NURSE PRACTITIONER

## 2020-07-09 PROCEDURE — 36415 COLL VENOUS BLD VENIPUNCTURE: CPT | Mod: ZL | Performed by: NURSE PRACTITIONER

## 2020-07-09 PROCEDURE — G0463 HOSPITAL OUTPT CLINIC VISIT: HCPCS

## 2020-07-09 PROCEDURE — 80053 COMPREHEN METABOLIC PANEL: CPT | Mod: ZL | Performed by: NURSE PRACTITIONER

## 2020-07-09 PROCEDURE — 99213 OFFICE O/P EST LOW 20 MIN: CPT | Performed by: NURSE PRACTITIONER

## 2020-07-09 PROCEDURE — G0463 HOSPITAL OUTPT CLINIC VISIT: HCPCS | Mod: 25

## 2020-07-09 RX ORDER — ONDANSETRON 4 MG/1
8 TABLET, FILM COATED ORAL ONCE
Status: COMPLETED | OUTPATIENT
Start: 2020-07-09 | End: 2020-07-09

## 2020-07-09 RX ADMIN — BORTEZOMIB 2.7 MG: 3.5 INJECTION, POWDER, LYOPHILIZED, FOR SOLUTION INTRAVENOUS; SUBCUTANEOUS at 12:50

## 2020-07-09 RX ADMIN — ONDANSETRON HYDROCHLORIDE 8 MG: 4 TABLET, FILM COATED ORAL at 12:49

## 2020-07-09 ASSESSMENT — PAIN SCALES - GENERAL
PAINLEVEL: NO PAIN (0)
PAINLEVEL: NO PAIN (0)

## 2020-07-09 ASSESSMENT — MIFFLIN-ST. JEOR
SCORE: 1230.97
SCORE: 1231

## 2020-07-09 NOTE — PROGRESS NOTES
Peripheral lab draw performed by this RN. Patient tolerated well. 2 tubes of blood taken for ordered labs.

## 2020-07-09 NOTE — PATIENT INSTRUCTIONS
We would like to see you back per your schedule.     When you are in need of a refill, please call your pharmacy and they will send us a request.     If you have any questions please call [phone]    Other instructions:  none

## 2020-07-09 NOTE — NURSING NOTE
"Chief Complaint   Patient presents with     RECHECK     multiple myeloma       Initial /80   Pulse 76   Temp 96.4  F (35.8  C) (Tympanic)   Ht 1.6 m (5' 2.99\")   Wt 74.7 kg (164 lb 10.9 oz)   SpO2 96%   BMI 29.18 kg/m   Estimated body mass index is 29.18 kg/m  as calculated from the following:    Height as of this encounter: 1.6 m (5' 2.99\").    Weight as of this encounter: 74.7 kg (164 lb 10.9 oz).  Medication Reconciliation: complete     Immunizations and advance directives status reviewed. Pain scale 0 , PHQ-2 coping/sees PCP next week.          Elena Holcomb LPN  "

## 2020-07-16 ENCOUNTER — INFUSION THERAPY VISIT (OUTPATIENT)
Dept: INFUSION THERAPY | Facility: OTHER | Age: 70
End: 2020-07-16
Attending: INTERNAL MEDICINE
Payer: MEDICARE

## 2020-07-16 ENCOUNTER — OFFICE VISIT (OUTPATIENT)
Dept: FAMILY MEDICINE | Facility: OTHER | Age: 70
End: 2020-07-16
Attending: PHYSICIAN ASSISTANT
Payer: MEDICARE

## 2020-07-16 ENCOUNTER — APPOINTMENT (OUTPATIENT)
Dept: LAB | Facility: OTHER | Age: 70
End: 2020-07-16
Attending: PHYSICIAN ASSISTANT
Payer: MEDICARE

## 2020-07-16 VITALS
HEART RATE: 82 BPM | WEIGHT: 163.36 LBS | DIASTOLIC BLOOD PRESSURE: 74 MMHG | TEMPERATURE: 98.2 F | BODY MASS INDEX: 28.95 KG/M2 | HEIGHT: 63 IN | RESPIRATION RATE: 18 BRPM | OXYGEN SATURATION: 98 % | SYSTOLIC BLOOD PRESSURE: 103 MMHG

## 2020-07-16 VITALS
DIASTOLIC BLOOD PRESSURE: 70 MMHG | HEART RATE: 78 BPM | TEMPERATURE: 98 F | WEIGHT: 160 LBS | HEIGHT: 63 IN | SYSTOLIC BLOOD PRESSURE: 112 MMHG | OXYGEN SATURATION: 98 % | BODY MASS INDEX: 28.35 KG/M2

## 2020-07-16 DIAGNOSIS — C90.00 MULTIPLE MYELOMA NOT HAVING ACHIEVED REMISSION (H): Primary | ICD-10-CM

## 2020-07-16 DIAGNOSIS — F33.0 MAJOR DEPRESSIVE DISORDER, RECURRENT EPISODE, MILD (H): ICD-10-CM

## 2020-07-16 DIAGNOSIS — E78.2 MIXED HYPERLIPIDEMIA: ICD-10-CM

## 2020-07-16 LAB
ALBUMIN SERPL-MCNC: 3.7 G/DL (ref 3.4–5)
ALP SERPL-CCNC: 107 U/L (ref 40–150)
ALT SERPL W P-5'-P-CCNC: 20 U/L (ref 0–50)
AST SERPL W P-5'-P-CCNC: 15 U/L (ref 0–45)
BASOPHILS # BLD AUTO: 0 10E9/L (ref 0–0.2)
BASOPHILS NFR BLD AUTO: 0.4 %
BILIRUB DIRECT SERPL-MCNC: <0.1 MG/DL (ref 0–0.2)
BILIRUB SERPL-MCNC: 0.5 MG/DL (ref 0.2–1.3)
DIFFERENTIAL METHOD BLD: ABNORMAL
EOSINOPHIL # BLD AUTO: 0.2 10E9/L (ref 0–0.7)
EOSINOPHIL NFR BLD AUTO: 3.8 %
ERYTHROCYTE [DISTWIDTH] IN BLOOD BY AUTOMATED COUNT: 14.9 % (ref 10–15)
HCT VFR BLD AUTO: 34.7 % (ref 35–47)
HGB BLD-MCNC: 11.8 G/DL (ref 11.7–15.7)
IMM GRANULOCYTES # BLD: 0.1 10E9/L (ref 0–0.4)
IMM GRANULOCYTES NFR BLD: 2.2 %
LYMPHOCYTES # BLD AUTO: 1 10E9/L (ref 0.8–5.3)
LYMPHOCYTES NFR BLD AUTO: 18.8 %
MCH RBC QN AUTO: 31.6 PG (ref 26.5–33)
MCHC RBC AUTO-ENTMCNC: 34 G/DL (ref 31.5–36.5)
MCV RBC AUTO: 93 FL (ref 78–100)
MONOCYTES # BLD AUTO: 1.2 10E9/L (ref 0–1.3)
MONOCYTES NFR BLD AUTO: 21.3 %
NEUTROPHILS # BLD AUTO: 2.9 10E9/L (ref 1.6–8.3)
NEUTROPHILS NFR BLD AUTO: 53.5 %
NRBC # BLD AUTO: 0 10*3/UL
NRBC BLD AUTO-RTO: 0 /100
PLATELET # BLD AUTO: 225 10E9/L (ref 150–450)
PLATELET # BLD EST: ABNORMAL 10*3/UL
PROT SERPL-MCNC: 8.6 G/DL (ref 6.8–8.8)
RBC # BLD AUTO: 3.73 10E12/L (ref 3.8–5.2)
RBC MORPH BLD: ABNORMAL
WBC # BLD AUTO: 5.5 10E9/L (ref 4–11)

## 2020-07-16 PROCEDURE — 99214 OFFICE O/P EST MOD 30 MIN: CPT | Performed by: PHYSICIAN ASSISTANT

## 2020-07-16 PROCEDURE — 25000128 H RX IP 250 OP 636: Performed by: NURSE PRACTITIONER

## 2020-07-16 PROCEDURE — 80076 HEPATIC FUNCTION PANEL: CPT | Mod: ZL | Performed by: NURSE PRACTITIONER

## 2020-07-16 PROCEDURE — G0463 HOSPITAL OUTPT CLINIC VISIT: HCPCS | Mod: 25

## 2020-07-16 PROCEDURE — 36415 COLL VENOUS BLD VENIPUNCTURE: CPT | Mod: ZL | Performed by: NURSE PRACTITIONER

## 2020-07-16 PROCEDURE — 85025 COMPLETE CBC W/AUTO DIFF WBC: CPT | Mod: ZL | Performed by: NURSE PRACTITIONER

## 2020-07-16 PROCEDURE — 96401 CHEMO ANTI-NEOPL SQ/IM: CPT

## 2020-07-16 PROCEDURE — G0463 HOSPITAL OUTPT CLINIC VISIT: HCPCS

## 2020-07-16 RX ORDER — ONDANSETRON 4 MG/1
8 TABLET, FILM COATED ORAL ONCE
Status: COMPLETED | OUTPATIENT
Start: 2020-07-16 | End: 2020-07-16

## 2020-07-16 RX ADMIN — BORTEZOMIB 2.7 MG: 3.5 INJECTION, POWDER, LYOPHILIZED, FOR SOLUTION INTRAVENOUS; SUBCUTANEOUS at 13:17

## 2020-07-16 RX ADMIN — ONDANSETRON HYDROCHLORIDE 8 MG: 4 TABLET, FILM COATED ORAL at 12:31

## 2020-07-16 ASSESSMENT — MIFFLIN-ST. JEOR
SCORE: 1225
SCORE: 1209.73

## 2020-07-16 ASSESSMENT — PAIN SCALES - GENERAL
PAINLEVEL: NO PAIN (0)
PAINLEVEL: NO PAIN (0)

## 2020-07-16 NOTE — PROGRESS NOTES
Subjective     Carmelita Watts is a 69 year old female who presents to clinic today for the following health issues:    HPI       Hyperlipidemia Follow-Up      Are you regularly taking any medication or supplement to lower your cholesterol?   Yes- Lipitor    Are you having muscle aches or other side effects that you think could be caused by your cholesterol lowering medication?  No    Hypertension Follow-up      Do you check your blood pressure regularly outside of the clinic? No     Are you following a low salt diet? No    Are your blood pressures ever more than 140 on the top number (systolic) OR more   than 90 on the bottom number (diastolic), for example 140/90? No      How many servings of fruits and vegetables do you eat daily?  2-3    On average, how many sweetened beverages do you drink each day (Examples: soda, juice, sweet tea, etc.  Do NOT count diet or artificially sweetened beverages)?   0    How many days per week do you exercise enough to make your heart beat faster? 5    How many minutes a day do you exercise enough to make your heart beat faster? 10 - 19    How many days per week do you miss taking your medication? 0    Grief reacton:   is now in Hospice. discussion on this.     Patient Active Problem List   Diagnosis     Hyperlipidemia LDL goal <130     Hypertension goal BP (blood pressure) < 140/80     Advanced care planning/counseling discussion     Major depressive disorder, recurrent episode, mild (H)     Seasonal allergies     Mixed hyperlipidemia     ACP (advance care planning)     Multiple myeloma not having achieved remission (H)     Past Surgical History:   Procedure Laterality Date     APPENDECTOMY       BONE MARROW BIOPSY, BONE SPECIMEN, NEEDLE/TROCAR N/A 6/18/2019    Procedure: BONE MARROW BIOPSY;  Surgeon: Maciej Sanz MD;  Location: [facility]     CHOLECYSTECTOMY       COLONOSCOPY  07-    repeat 10 years     COLONOSCOPY N/A 12/30/2016    Procedure: COLONOSCOPY;  Surgeon:  "Bhaskar Franklin, DO;  Location: [facility]     SINUS SURGERY       TUBAL STERILIZATION         Social History     Tobacco Use     Smoking status: Never Smoker     Smokeless tobacco: Never Used     Tobacco comment: Tried to Quit (YES); QUIT in 1971; Passive Exposure (NO)   Substance Use Topics     Alcohol use: Yes     Comment: RARELY     Family History   Problem Relation Age of Onset     Breast Cancer Mother 66        Cause of Death     Parkinsonism Father         \"Possible\"     Coronary Artery Disease Father      Pacemaker Father      Thyroid Disease Daughter      Diabetes No family hx of      Hypertension No family hx of      Hyperlipidemia No family hx of      Cerebrovascular Disease No family hx of      Colon Cancer No family hx of      Prostate Cancer No family hx of      Genetic Disorder No family hx of      Asthma No family hx of      Anesthesia Reaction No family hx of          Current Outpatient Medications   Medication Sig Dispense Refill     acyclovir (ZOVIRAX) 400 MG tablet Take 1 tablet (400 mg) by mouth 2 times daily Viral Prophylaxis. 60 tablet 3     aspirin (ASA) 81 MG chewable tablet Take 81 mg by mouth daily       atorvastatin (LIPITOR) 10 MG tablet Take 1 tablet (10 mg) by mouth daily 90 tablet 3     Bortezomib (VELCADE IJ)        cyclophosphamide 50 MG PO capsule Take 3 capsules (150 mg) by mouth daily On days 1, 8, 15, 22 12 capsule 11     dexamethasone (DECADRON) 4 MG tablet TAKE 10 TABLETS BY MOUTH DAILY ON DAYS 1, 8, 15, AND 22 120 tablet 3     EPINEPHrine (EPIPEN) 0.3 MG/0.3ML injection Inject 0.3 mg into the muscle once as needed. Use as needed for anaphylaxis.       fluticasone (FLONASE) 50 MCG/ACT nasal spray SPRAY 2 SPRAYS INTO BOTH NOSTRILS DAILY 48 mL 0     LORazepam (ATIVAN) 0.5 MG tablet Take 1 tablet (0.5 mg) by mouth every 4 hours as needed (Anxiety, Nausea/Vomiting or Sleep) 30 tablet 2     losartan (COZAAR) 50 MG tablet Take 1 tablet (50 mg) by mouth daily 90 tablet 3     " ondansetron (ZOFRAN) 8 MG tablet Take 1 tablet (8 mg) by mouth every 8 hours as needed (Nausea/Vomiting) 10 tablet 2     prochlorperazine (COMPAZINE) 10 MG tablet Take 1 tablet (10 mg) by mouth every 6 hours as needed (Nausea/Vomiting) 30 tablet 3     sertraline (ZOLOFT) 50 MG tablet Take 1 tablet (50 mg) by mouth daily 45 tablet 3     Allergies   Allergen Reactions     Lisinopril Cough     Phenylephrine Hcl Other (See Comments)     **Entex  HEADACHE (SEVERE)     Phenylpropanolamine Other (See Comments)     **Entex  HEADACHE (SEVERE)     Pseudoephedrine Tannate Other (See Comments)     **Entex  HEADACHE (SEVERE)     Levofloxacin Rash     **Levaquin     Moxifloxacin Hcl [Avelox] Rash     Recent Labs   Lab Test 07/16/20  0918 07/09/20  1041 06/25/20  1205  06/11/20  0835  02/14/20  0947  01/03/19  1043  11/17/17  0844   LDL  --   --   --   --   --   --  125*  --  86  --  108*   HDL  --   --   --   --   --   --  44*  --  42*  --  60   TRIG  --   --   --   --   --   --  270*  --  126  --  78   ALT 20 20 23   < > 21   < >  --    < >  --    < > 27   CR  --  0.63  --   --  0.72   < >  --    < >  --    < > 0.69   GFRESTIMATED  --  90  --   --  85   < >  --    < >  --    < > 84   GFRESTBLACK  --  >90  --   --  >90   < >  --    < >  --    < > >90   POTASSIUM  --  3.8  --   --  3.9   < >  --    < >  --    < > 4.1   TSH  --   --   --   --   --   --  1.86  --   --   --  1.77    < > = values in this interval not displayed.      BP Readings from Last 3 Encounters:   07/16/20 112/70   07/09/20 124/80   07/09/20 124/80    Wt Readings from Last 3 Encounters:   07/16/20 72.6 kg (160 lb)   07/09/20 74.7 kg (164 lb 10.9 oz)   07/09/20 74.7 kg (164 lb 10.9 oz)                    Reviewed and updated as needed this visit by Provider         Review of Systems   Constitutional, HEENT, cardiovascular, pulmonary, gi and gu systems are negative, except as otherwise noted.      Objective    /70 (BP Location: Right arm, Patient Position:  "Sitting, Cuff Size: Adult Regular)   Pulse 78   Temp 98  F (36.7  C) (Tympanic)   Ht 1.6 m (5' 2.99\")   Wt 72.6 kg (160 lb)   SpO2 98%   BMI 28.35 kg/m    Body mass index is 28.35 kg/m .  Physical Exam   GENERAL: healthy, alert and no distress  EYES: Eyes grossly normal to inspection, PERRL and conjunctivae and sclerae normal  HENT: ear canals and TM's normal, nose and mouth without ulcers or lesions  NECK: no adenopathy, no asymmetry, masses, or scars and thyroid normal to palpation  RESP: lungs clear to auscultation - no rales, rhonchi or wheezes  CV: regular rate and rhythm, normal S1 S2, no S3 or S4, no murmur, click or rub, no peripheral edema and peripheral pulses strong  ABDOMEN: soft, nontender, no hepatosplenomegaly, no masses and bowel sounds normal  MS: no gross musculoskeletal defects noted, no edema  SKIN: no suspicious lesions or rashes  NEURO: Normal strength and tone, mentation intact and speech normal  LYMPH: no cervical, supraclavicular, axillary, or inguinal adenopathy    Diagnostic Test Results:  Labs reviewed in Epic  No results found for this or any previous visit (from the past 24 hour(s)).        Assessment & Plan   1. Multiple myeloma not having achieved remission (H)  She is dong ok. In he middle of grief of her . He has pancreatic cancer.      2. Major depressive disorder, recurrent episode, mild (H)  She is doing ok. Very pragmatic on her life right now.  hospice in home with her . Grief is a huge issue and family is now involved.     3. Mixed hyperlipidemia  Improving and good labs.       Time spent face to face 45 minutes.      BMI:   Estimated body mass index is 28.35 kg/m  as calculated from the following:    Height as of this encounter: 1.6 m (5' 2.99\").    Weight as of this encounter: 72.6 kg (160 lb).           See Patient Instructions    No follow-ups on file.    VENKATESH Trevino  [facility] [place]    "

## 2020-07-16 NOTE — PROGRESS NOTES
Patient is a 69 year old female here today for injection of Velcade per order of . Patient meets parameters for today's infusion. Patient identified with two identifiers, order verified, and verbal consent for today's infusion obtained from patient. Written consent for treatment is on file and valid.    Today's lab values: WBC: 5.5, HGB: 11.8, PLT: 225  ANC: 2.9.     Patient meets order parameters for today's treatment.    Patient denies questions or concerns regarding injection and/or medication(s) being administered.    Independent dose check completed with Julia ADEN RN.    Velcade injected SQ into left upper outer arm at 45 degree angle per protocol rotating sites. Patient tolerated injection without incident, no signs or symptoms of adverse reaction noted. Patient denies pain or discomfort.     Covered with a sterile bandage. Pt instructed to leave bandage intact for a minimum of one hour, and to call with questions or concerns. Patient states understanding, discharged.

## 2020-07-16 NOTE — NURSING NOTE
"Chief Complaint   Patient presents with     Hypertension     Lipids       Initial /70 (BP Location: Right arm, Patient Position: Sitting, Cuff Size: Adult Regular)   Pulse 78   Temp 98  F (36.7  C) (Tympanic)   Ht 1.6 m (5' 2.99\")   Wt 72.6 kg (160 lb)   SpO2 98%   BMI 28.35 kg/m   Estimated body mass index is 28.35 kg/m  as calculated from the following:    Height as of this encounter: 1.6 m (5' 2.99\").    Weight as of this encounter: 72.6 kg (160 lb).  Medication Reconciliation: complete  Annmarie Laguna LPN  "

## 2020-07-16 NOTE — PATIENT INSTRUCTIONS
Thank you for choosing [facility].   I have office hours 8:00 am to 4:30 pm on Monday's, Wednesday's, Thursday's and Friday's. My nurse and I are out of the office every Tuesday.    Following your visit, when your labs and diagnostic testing have returned, I will review then and you will be contacted by my nurse.  If you are on My Chart, you can also view results there.    For refills, notify your pharmacy regarding what you need and the pharmacy will generate a refill request. Do not call my nurse as she is unable to process refill request. Please plan ahead and allow 3-5 days for refill requests.    You will generally receive a reminder call the day prior to your appointment.  If you cannot attend your appointment, please cancel your appointment with as much notice as possible.  If there is a pattern of failure to present for your appointments, I cannot provide consistent, meaningful, ongoing care for you. It is very important to me that you come in for your care, so we can best assist you with your health care needs.    IMPORTANT:  Please note that it is my standard of practice to NOT participate in prescribing ongoing requested Narcotic Analgesic therapy, and/or participate in the prescribing of other controlled substances.  My nurse and I am happy to assist you with the process of referral for alternative pain management as needed, and other treatment modalities including but not limited to:  Physical Therapy, Physical Medicine and Rehab, Counseling, Chiropractic Care, Orthopedic Care, and non-narcotic medication management.     In the event that you need to be seen for emergent concerns and I am out of office,  please see one of my colleagues for acute concerns.  You may also present to  or ER.  I appreciate the opportunity to serve you and look forward to supporting your healthcare needs in the future. Please contact me with any questions or concerns that you may  have.    Sincerely,      Kenyatta Trujillo RN, PA-C

## 2020-07-23 ENCOUNTER — INFUSION THERAPY VISIT (OUTPATIENT)
Dept: INFUSION THERAPY | Facility: OTHER | Age: 70
End: 2020-07-23
Attending: INTERNAL MEDICINE
Payer: MEDICARE

## 2020-07-23 VITALS
SYSTOLIC BLOOD PRESSURE: 129 MMHG | TEMPERATURE: 98.4 F | HEIGHT: 63 IN | RESPIRATION RATE: 16 BRPM | HEART RATE: 66 BPM | BODY MASS INDEX: 29.38 KG/M2 | OXYGEN SATURATION: 98 % | DIASTOLIC BLOOD PRESSURE: 79 MMHG | WEIGHT: 165.79 LBS

## 2020-07-23 DIAGNOSIS — C90.00 MULTIPLE MYELOMA NOT HAVING ACHIEVED REMISSION (H): Primary | ICD-10-CM

## 2020-07-23 LAB
ALBUMIN SERPL-MCNC: 3.4 G/DL (ref 3.4–5)
ALP SERPL-CCNC: 110 U/L (ref 40–150)
ALT SERPL W P-5'-P-CCNC: 19 U/L (ref 0–50)
AST SERPL W P-5'-P-CCNC: 14 U/L (ref 0–45)
BASOPHILS # BLD AUTO: 0 10E9/L (ref 0–0.2)
BASOPHILS NFR BLD AUTO: 0.6 %
BILIRUB DIRECT SERPL-MCNC: <0.1 MG/DL (ref 0–0.2)
BILIRUB SERPL-MCNC: 0.4 MG/DL (ref 0.2–1.3)
DIFFERENTIAL METHOD BLD: ABNORMAL
EOSINOPHIL # BLD AUTO: 0.1 10E9/L (ref 0–0.7)
EOSINOPHIL NFR BLD AUTO: 2.6 %
ERYTHROCYTE [DISTWIDTH] IN BLOOD BY AUTOMATED COUNT: 14.8 % (ref 10–15)
HCT VFR BLD AUTO: 34 % (ref 35–47)
HGB BLD-MCNC: 11.6 G/DL (ref 11.7–15.7)
IMM GRANULOCYTES # BLD: 0.1 10E9/L (ref 0–0.4)
IMM GRANULOCYTES NFR BLD: 2 %
LYMPHOCYTES # BLD AUTO: 1.3 10E9/L (ref 0.8–5.3)
LYMPHOCYTES NFR BLD AUTO: 23.3 %
MCH RBC QN AUTO: 32 PG (ref 26.5–33)
MCHC RBC AUTO-ENTMCNC: 34.1 G/DL (ref 31.5–36.5)
MCV RBC AUTO: 94 FL (ref 78–100)
MONOCYTES # BLD AUTO: 1 10E9/L (ref 0–1.3)
MONOCYTES NFR BLD AUTO: 19 %
NEUTROPHILS # BLD AUTO: 2.8 10E9/L (ref 1.6–8.3)
NEUTROPHILS NFR BLD AUTO: 52.5 %
NRBC # BLD AUTO: 0 10*3/UL
NRBC BLD AUTO-RTO: 0 /100
PLATELET # BLD AUTO: 233 10E9/L (ref 150–450)
PROT SERPL-MCNC: 8 G/DL (ref 6.8–8.8)
RBC # BLD AUTO: 3.62 10E12/L (ref 3.8–5.2)
WBC # BLD AUTO: 5.4 10E9/L (ref 4–11)

## 2020-07-23 PROCEDURE — 96401 CHEMO ANTI-NEOPL SQ/IM: CPT

## 2020-07-23 PROCEDURE — 85025 COMPLETE CBC W/AUTO DIFF WBC: CPT | Mod: ZL | Performed by: NURSE PRACTITIONER

## 2020-07-23 PROCEDURE — 80076 HEPATIC FUNCTION PANEL: CPT | Mod: ZL | Performed by: NURSE PRACTITIONER

## 2020-07-23 PROCEDURE — 36415 COLL VENOUS BLD VENIPUNCTURE: CPT | Mod: ZL | Performed by: NURSE PRACTITIONER

## 2020-07-23 PROCEDURE — 25000128 H RX IP 250 OP 636: Performed by: NURSE PRACTITIONER

## 2020-07-23 RX ORDER — ONDANSETRON 4 MG/1
8 TABLET, FILM COATED ORAL ONCE
Status: COMPLETED | OUTPATIENT
Start: 2020-07-23 | End: 2020-07-23

## 2020-07-23 RX ADMIN — ONDANSETRON HYDROCHLORIDE 8 MG: 4 TABLET, FILM COATED ORAL at 14:25

## 2020-07-23 RX ADMIN — BORTEZOMIB 2.7 MG: 3.5 INJECTION, POWDER, LYOPHILIZED, FOR SOLUTION INTRAVENOUS; SUBCUTANEOUS at 14:26

## 2020-07-23 ASSESSMENT — PAIN SCALES - GENERAL: PAINLEVEL: NO PAIN (0)

## 2020-07-23 ASSESSMENT — MIFFLIN-ST. JEOR: SCORE: 1236.13

## 2020-07-23 NOTE — PROGRESS NOTES
Patient is a 69 year old female here today for injection of Velcade per order of . Patient meets parameters for today's infusion. Patient identified with two identifiers, order verified, and verbal consent for today's infusion obtained from patient. Written consent for treatment is on file and valid.    Recent lab values: WBC: 5.4, HGB: 11.6, PLT: 233  ANC: 2.8. Patient meets order parameters for today's treatment.  Patient denies questions or concerns regarding injection and/or medication(s) being administered.  Velcade injected SQ into right upper outer arm per protocol rotating sites.     Injection administered per protocol. Patient tolerated infusion without incident, no signs or symptoms of adverse reaction noted. Patient denies pain or discomfort.     Covered with a sterile bandage. Pt instructed to leave bandage intact for a minimum of one hour, and to call with questions or concerns. Copy of appointments, discharge instructions, and after visit summary (AVS) provided to patient. Patient states understanding, discharged ambulatory.

## 2020-07-28 DIAGNOSIS — C90.00 MULTIPLE MYELOMA NOT HAVING ACHIEVED REMISSION (H): Primary | ICD-10-CM

## 2020-07-28 NOTE — PROGRESS NOTES
Patient would like to come in a day early for Big labs and labs for her zometa so that she will not need to be gone to long on Thursday. Labs placed.  Christine Otoole RN Oncology Care Coordinator

## 2020-07-29 DIAGNOSIS — C90.00 MULTIPLE MYELOMA NOT HAVING ACHIEVED REMISSION (H): ICD-10-CM

## 2020-07-29 LAB
ALBUMIN SERPL-MCNC: 3.4 G/DL (ref 3.4–5)
BASOPHILS # BLD AUTO: 0 10E9/L (ref 0–0.2)
BASOPHILS NFR BLD AUTO: 0.4 %
CALCIUM SERPL-MCNC: 7.9 MG/DL (ref 8.5–10.1)
CREAT SERPL-MCNC: 1.08 MG/DL (ref 0.52–1.04)
DIFFERENTIAL METHOD BLD: ABNORMAL
EOSINOPHIL # BLD AUTO: 0.2 10E9/L (ref 0–0.7)
EOSINOPHIL NFR BLD AUTO: 3.3 %
ERYTHROCYTE [DISTWIDTH] IN BLOOD BY AUTOMATED COUNT: 14.6 % (ref 10–15)
GFR SERPL CREATININE-BSD FRML MDRD: 51 ML/MIN/{1.73_M2}
HCT VFR BLD AUTO: 33.2 % (ref 35–47)
HGB BLD-MCNC: 11.2 G/DL (ref 11.7–15.7)
IMM GRANULOCYTES # BLD: 0.1 10E9/L (ref 0–0.4)
IMM GRANULOCYTES NFR BLD: 2.9 %
LYMPHOCYTES # BLD AUTO: 1.1 10E9/L (ref 0.8–5.3)
LYMPHOCYTES NFR BLD AUTO: 21.7 %
MCH RBC QN AUTO: 31.9 PG (ref 26.5–33)
MCHC RBC AUTO-ENTMCNC: 33.7 G/DL (ref 31.5–36.5)
MCV RBC AUTO: 95 FL (ref 78–100)
MONOCYTES # BLD AUTO: 1 10E9/L (ref 0–1.3)
MONOCYTES NFR BLD AUTO: 20.9 %
NEUTROPHILS # BLD AUTO: 2.5 10E9/L (ref 1.6–8.3)
NEUTROPHILS NFR BLD AUTO: 50.8 %
NRBC # BLD AUTO: 0 10*3/UL
NRBC BLD AUTO-RTO: 0 /100
PLATELET # BLD AUTO: 203 10E9/L (ref 150–450)
PLATELET # BLD EST: ABNORMAL 10*3/UL
RBC # BLD AUTO: 3.51 10E12/L (ref 3.8–5.2)
RBC MORPH BLD: ABNORMAL
WBC # BLD AUTO: 4.9 10E9/L (ref 4–11)

## 2020-07-29 PROCEDURE — 36415 COLL VENOUS BLD VENIPUNCTURE: CPT | Mod: ZL | Performed by: INTERNAL MEDICINE

## 2020-07-29 PROCEDURE — 85810 BLOOD VISCOSITY EXAMINATION: CPT | Mod: ZL | Performed by: INTERNAL MEDICINE

## 2020-07-29 PROCEDURE — 82232 ASSAY OF BETA-2 PROTEIN: CPT | Mod: ZL | Performed by: INTERNAL MEDICINE

## 2020-07-29 PROCEDURE — 82784 ASSAY IGA/IGD/IGG/IGM EACH: CPT | Mod: ZL | Performed by: INTERNAL MEDICINE

## 2020-07-29 PROCEDURE — 83883 ASSAY NEPHELOMETRY NOT SPEC: CPT | Mod: ZL | Performed by: INTERNAL MEDICINE

## 2020-07-29 PROCEDURE — 00000402 ZZHCL STATISTIC TOTAL PROTEIN: Mod: ZL | Performed by: INTERNAL MEDICINE

## 2020-07-29 PROCEDURE — 84165 PROTEIN E-PHORESIS SERUM: CPT | Mod: ZL | Performed by: INTERNAL MEDICINE

## 2020-07-29 PROCEDURE — 82310 ASSAY OF CALCIUM: CPT | Mod: ZL | Performed by: INTERNAL MEDICINE

## 2020-07-29 PROCEDURE — 82040 ASSAY OF SERUM ALBUMIN: CPT | Mod: ZL | Performed by: INTERNAL MEDICINE

## 2020-07-29 PROCEDURE — 85025 COMPLETE CBC W/AUTO DIFF WBC: CPT | Mod: ZL | Performed by: INTERNAL MEDICINE

## 2020-07-29 PROCEDURE — 82565 ASSAY OF CREATININE: CPT | Mod: ZL | Performed by: INTERNAL MEDICINE

## 2020-07-30 ENCOUNTER — INFUSION THERAPY VISIT (OUTPATIENT)
Dept: INFUSION THERAPY | Facility: OTHER | Age: 70
End: 2020-07-30
Attending: INTERNAL MEDICINE
Payer: MEDICARE

## 2020-07-30 VITALS
RESPIRATION RATE: 16 BRPM | SYSTOLIC BLOOD PRESSURE: 116 MMHG | OXYGEN SATURATION: 97 % | HEART RATE: 71 BPM | TEMPERATURE: 98 F | BODY MASS INDEX: 29.41 KG/M2 | WEIGHT: 166.01 LBS | HEIGHT: 63 IN | DIASTOLIC BLOOD PRESSURE: 75 MMHG

## 2020-07-30 DIAGNOSIS — C90.00 MULTIPLE MYELOMA NOT HAVING ACHIEVED REMISSION (H): Primary | ICD-10-CM

## 2020-07-30 LAB
ALBUMIN SERPL ELPH-MCNC: 4 G/DL (ref 3.7–5.1)
ALPHA1 GLOB SERPL ELPH-MCNC: 0.3 G/DL (ref 0.2–0.4)
ALPHA2 GLOB SERPL ELPH-MCNC: 0.7 G/DL (ref 0.5–0.9)
B-GLOBULIN SERPL ELPH-MCNC: 0.6 G/DL (ref 0.6–1)
GAMMA GLOB SERPL ELPH-MCNC: 1.9 G/DL (ref 0.7–1.6)
IGA SERPL-MCNC: 22 MG/DL (ref 84–499)
IGG SERPL-MCNC: 2045 MG/DL (ref 610–1616)
IGM SERPL-MCNC: <10 MG/DL (ref 35–242)
KAPPA LC UR-MCNC: 0.65 MG/DL (ref 0.33–1.94)
KAPPA LC/LAMBDA SER: 2.6 {RATIO} (ref 0.26–1.65)
LAMBDA LC SERPL-MCNC: 0.25 MG/DL (ref 0.57–2.63)
M PROTEIN SERPL ELPH-MCNC: 1.7 G/DL
PROT PATTERN SERPL ELPH-IMP: ABNORMAL
VISC SER: 1.6 CP (ref 1.4–1.8)

## 2020-07-30 PROCEDURE — 96401 CHEMO ANTI-NEOPL SQ/IM: CPT

## 2020-07-30 PROCEDURE — 25000128 H RX IP 250 OP 636: Performed by: NURSE PRACTITIONER

## 2020-07-30 RX ORDER — ONDANSETRON 4 MG/1
8 TABLET, FILM COATED ORAL ONCE
Status: COMPLETED | OUTPATIENT
Start: 2020-07-30 | End: 2020-07-30

## 2020-07-30 RX ADMIN — BORTEZOMIB 2.7 MG: 3.5 INJECTION, POWDER, LYOPHILIZED, FOR SOLUTION INTRAVENOUS; SUBCUTANEOUS at 13:39

## 2020-07-30 RX ADMIN — ONDANSETRON HYDROCHLORIDE 8 MG: 4 TABLET, FILM COATED ORAL at 13:37

## 2020-07-30 ASSESSMENT — MIFFLIN-ST. JEOR: SCORE: 1237.13

## 2020-07-30 ASSESSMENT — PAIN SCALES - GENERAL: PAINLEVEL: NO PAIN (0)

## 2020-07-30 NOTE — PROGRESS NOTES
Velcade injected SQ into left upper outer arm at 45 degree angle per protocol rotating sites. Patient tolerated injection without incident, no signs or symptoms of adverse reaction noted. Patient denies pain or discomfort.     Covered with a sterile bandage. Pt instructed to leave bandage intact for a minimum of one hour, and to call with questions or concerns. Patient states understanding, discharged.

## 2020-07-30 NOTE — PROGRESS NOTES
Patient is a 69 year old female here today for injection of Velcade per order of . Patient meets parameters for today's infusion. Patient identified with two identifiers, order verified, and verbal consent for today's infusion obtained from patient. Written consent for treatment is on file and valid.    Recent lab values: WBC: 4.9, HGB: 11.2, PLT: 203  ANC: 2.5. Patient meets order parameters for today's treatment.  Patient denies questions or concerns regarding injection and/or medication(s) being administered.    Copy of appointments, discharge instructions, and after visit summary (AVS) provided to patient. Patient states understanding, discharged ambulatory.

## 2020-07-31 LAB — B2 MICROGLOB SERPL-MCNC: 2.7 MG/L

## 2020-08-01 RX ORDER — MEPERIDINE HYDROCHLORIDE 25 MG/ML
25 INJECTION INTRAMUSCULAR; INTRAVENOUS; SUBCUTANEOUS EVERY 30 MIN PRN
Status: CANCELLED | OUTPATIENT
Start: 2020-08-06

## 2020-08-01 RX ORDER — ALBUTEROL SULFATE 0.83 MG/ML
2.5 SOLUTION RESPIRATORY (INHALATION)
Status: CANCELLED | OUTPATIENT
Start: 2020-08-13

## 2020-08-01 RX ORDER — SODIUM CHLORIDE 9 MG/ML
1000 INJECTION, SOLUTION INTRAVENOUS CONTINUOUS PRN
Status: CANCELLED
Start: 2020-08-27

## 2020-08-01 RX ORDER — ALBUTEROL SULFATE 0.83 MG/ML
2.5 SOLUTION RESPIRATORY (INHALATION)
Status: CANCELLED | OUTPATIENT
Start: 2020-08-20

## 2020-08-01 RX ORDER — METHYLPREDNISOLONE SODIUM SUCCINATE 125 MG/2ML
125 INJECTION, POWDER, LYOPHILIZED, FOR SOLUTION INTRAMUSCULAR; INTRAVENOUS
Status: CANCELLED
Start: 2020-08-27

## 2020-08-01 RX ORDER — SODIUM CHLORIDE 9 MG/ML
1000 INJECTION, SOLUTION INTRAVENOUS CONTINUOUS PRN
Status: CANCELLED
Start: 2020-08-13

## 2020-08-01 RX ORDER — NALOXONE HYDROCHLORIDE 0.4 MG/ML
.1-.4 INJECTION, SOLUTION INTRAMUSCULAR; INTRAVENOUS; SUBCUTANEOUS
Status: CANCELLED | OUTPATIENT
Start: 2020-08-27

## 2020-08-01 RX ORDER — SODIUM CHLORIDE 9 MG/ML
1000 INJECTION, SOLUTION INTRAVENOUS CONTINUOUS PRN
Status: CANCELLED
Start: 2020-08-06

## 2020-08-01 RX ORDER — EPINEPHRINE 0.3 MG/.3ML
0.3 INJECTION SUBCUTANEOUS EVERY 5 MIN PRN
Status: CANCELLED | OUTPATIENT
Start: 2020-08-27

## 2020-08-01 RX ORDER — METHYLPREDNISOLONE SODIUM SUCCINATE 125 MG/2ML
125 INJECTION, POWDER, LYOPHILIZED, FOR SOLUTION INTRAMUSCULAR; INTRAVENOUS
Status: CANCELLED
Start: 2020-08-06

## 2020-08-01 RX ORDER — ALBUTEROL SULFATE 90 UG/1
1-2 AEROSOL, METERED RESPIRATORY (INHALATION)
Status: CANCELLED
Start: 2020-08-13

## 2020-08-01 RX ORDER — MEPERIDINE HYDROCHLORIDE 25 MG/ML
25 INJECTION INTRAMUSCULAR; INTRAVENOUS; SUBCUTANEOUS EVERY 30 MIN PRN
Status: CANCELLED | OUTPATIENT
Start: 2020-08-13

## 2020-08-01 RX ORDER — NALOXONE HYDROCHLORIDE 0.4 MG/ML
.1-.4 INJECTION, SOLUTION INTRAMUSCULAR; INTRAVENOUS; SUBCUTANEOUS
Status: CANCELLED | OUTPATIENT
Start: 2020-08-20

## 2020-08-01 RX ORDER — ALBUTEROL SULFATE 90 UG/1
1-2 AEROSOL, METERED RESPIRATORY (INHALATION)
Status: CANCELLED
Start: 2020-08-06

## 2020-08-01 RX ORDER — MEPERIDINE HYDROCHLORIDE 25 MG/ML
25 INJECTION INTRAMUSCULAR; INTRAVENOUS; SUBCUTANEOUS EVERY 30 MIN PRN
Status: CANCELLED | OUTPATIENT
Start: 2020-08-20

## 2020-08-01 RX ORDER — ALBUTEROL SULFATE 90 UG/1
1-2 AEROSOL, METERED RESPIRATORY (INHALATION)
Status: CANCELLED
Start: 2020-08-27

## 2020-08-01 RX ORDER — NALOXONE HYDROCHLORIDE 0.4 MG/ML
.1-.4 INJECTION, SOLUTION INTRAMUSCULAR; INTRAVENOUS; SUBCUTANEOUS
Status: CANCELLED | OUTPATIENT
Start: 2020-08-13

## 2020-08-01 RX ORDER — DIPHENHYDRAMINE HYDROCHLORIDE 50 MG/ML
50 INJECTION INTRAMUSCULAR; INTRAVENOUS
Status: CANCELLED
Start: 2020-08-20

## 2020-08-01 RX ORDER — NALOXONE HYDROCHLORIDE 0.4 MG/ML
.1-.4 INJECTION, SOLUTION INTRAMUSCULAR; INTRAVENOUS; SUBCUTANEOUS
Status: CANCELLED | OUTPATIENT
Start: 2020-08-06

## 2020-08-01 RX ORDER — LORAZEPAM 2 MG/ML
0.5 INJECTION INTRAMUSCULAR EVERY 4 HOURS PRN
Status: CANCELLED
Start: 2020-08-27

## 2020-08-01 RX ORDER — ONDANSETRON 4 MG/1
8 TABLET, FILM COATED ORAL ONCE
Status: CANCELLED
Start: 2020-08-27

## 2020-08-01 RX ORDER — DIPHENHYDRAMINE HYDROCHLORIDE 50 MG/ML
50 INJECTION INTRAMUSCULAR; INTRAVENOUS
Status: CANCELLED
Start: 2020-08-13

## 2020-08-01 RX ORDER — SODIUM CHLORIDE 9 MG/ML
1000 INJECTION, SOLUTION INTRAVENOUS CONTINUOUS PRN
Status: CANCELLED
Start: 2020-08-20

## 2020-08-01 RX ORDER — ONDANSETRON 4 MG/1
8 TABLET, FILM COATED ORAL ONCE
Status: CANCELLED | OUTPATIENT
Start: 2020-08-13

## 2020-08-01 RX ORDER — ONDANSETRON 4 MG/1
8 TABLET, FILM COATED ORAL ONCE
Status: CANCELLED | OUTPATIENT
Start: 2020-08-06

## 2020-08-01 RX ORDER — METHYLPREDNISOLONE SODIUM SUCCINATE 125 MG/2ML
125 INJECTION, POWDER, LYOPHILIZED, FOR SOLUTION INTRAMUSCULAR; INTRAVENOUS
Status: CANCELLED
Start: 2020-08-20

## 2020-08-01 RX ORDER — ALBUTEROL SULFATE 0.83 MG/ML
2.5 SOLUTION RESPIRATORY (INHALATION)
Status: CANCELLED | OUTPATIENT
Start: 2020-08-27

## 2020-08-01 RX ORDER — EPINEPHRINE 1 MG/ML
0.3 INJECTION, SOLUTION, CONCENTRATE INTRAVENOUS EVERY 5 MIN PRN
Status: CANCELLED | OUTPATIENT
Start: 2020-08-27

## 2020-08-01 RX ORDER — METHYLPREDNISOLONE SODIUM SUCCINATE 125 MG/2ML
125 INJECTION, POWDER, LYOPHILIZED, FOR SOLUTION INTRAMUSCULAR; INTRAVENOUS
Status: CANCELLED
Start: 2020-08-13

## 2020-08-01 RX ORDER — DIPHENHYDRAMINE HYDROCHLORIDE 50 MG/ML
50 INJECTION INTRAMUSCULAR; INTRAVENOUS
Status: CANCELLED
Start: 2020-08-27

## 2020-08-01 RX ORDER — LORAZEPAM 2 MG/ML
0.5 INJECTION INTRAMUSCULAR EVERY 4 HOURS PRN
Status: CANCELLED
Start: 2020-08-06

## 2020-08-01 RX ORDER — EPINEPHRINE 1 MG/ML
0.3 INJECTION, SOLUTION, CONCENTRATE INTRAVENOUS EVERY 5 MIN PRN
Status: CANCELLED | OUTPATIENT
Start: 2020-08-06

## 2020-08-01 RX ORDER — EPINEPHRINE 1 MG/ML
0.3 INJECTION, SOLUTION, CONCENTRATE INTRAVENOUS EVERY 5 MIN PRN
Status: CANCELLED | OUTPATIENT
Start: 2020-08-20

## 2020-08-01 RX ORDER — EPINEPHRINE 1 MG/ML
0.3 INJECTION, SOLUTION, CONCENTRATE INTRAVENOUS EVERY 5 MIN PRN
Status: CANCELLED | OUTPATIENT
Start: 2020-08-13

## 2020-08-01 RX ORDER — LORAZEPAM 2 MG/ML
0.5 INJECTION INTRAMUSCULAR EVERY 4 HOURS PRN
Status: CANCELLED
Start: 2020-08-13

## 2020-08-01 RX ORDER — ALBUTEROL SULFATE 90 UG/1
1-2 AEROSOL, METERED RESPIRATORY (INHALATION)
Status: CANCELLED
Start: 2020-08-20

## 2020-08-01 RX ORDER — EPINEPHRINE 0.3 MG/.3ML
0.3 INJECTION SUBCUTANEOUS EVERY 5 MIN PRN
Status: CANCELLED | OUTPATIENT
Start: 2020-08-06

## 2020-08-01 RX ORDER — EPINEPHRINE 0.3 MG/.3ML
0.3 INJECTION SUBCUTANEOUS EVERY 5 MIN PRN
Status: CANCELLED | OUTPATIENT
Start: 2020-08-20

## 2020-08-01 RX ORDER — ONDANSETRON 4 MG/1
8 TABLET, FILM COATED ORAL ONCE
Status: CANCELLED | OUTPATIENT
Start: 2020-08-20

## 2020-08-01 RX ORDER — EPINEPHRINE 0.3 MG/.3ML
0.3 INJECTION SUBCUTANEOUS EVERY 5 MIN PRN
Status: CANCELLED | OUTPATIENT
Start: 2020-08-13

## 2020-08-01 RX ORDER — LORAZEPAM 2 MG/ML
0.5 INJECTION INTRAMUSCULAR EVERY 4 HOURS PRN
Status: CANCELLED
Start: 2020-08-20

## 2020-08-01 RX ORDER — MEPERIDINE HYDROCHLORIDE 25 MG/ML
25 INJECTION INTRAMUSCULAR; INTRAVENOUS; SUBCUTANEOUS EVERY 30 MIN PRN
Status: CANCELLED | OUTPATIENT
Start: 2020-08-27

## 2020-08-01 RX ORDER — DIPHENHYDRAMINE HYDROCHLORIDE 50 MG/ML
50 INJECTION INTRAMUSCULAR; INTRAVENOUS
Status: CANCELLED
Start: 2020-08-06

## 2020-08-01 RX ORDER — ALBUTEROL SULFATE 0.83 MG/ML
2.5 SOLUTION RESPIRATORY (INHALATION)
Status: CANCELLED | OUTPATIENT
Start: 2020-08-06

## 2020-08-01 NOTE — PROGRESS NOTES
Oncology Follow-up Visit:  August 5, 2020    Reason for Visit:  Patient presents with:  RECHECK: Follow up Multiple myeloma not having achieved remission      Nursing Note and documentation reviewed: yes    HPI:  This is a 69-year-old female patient who presents to the oncology clinic today for evaluation prior to receiving cycle 12 chemotherapy for Stage II-RISS IgG multiple myeloma diagnosed June 2019.  She progressed on Velcade and dexamethasone and is now receiving CyBorD with decreased dose of cytoxan related to LFT elevation.      She presents to the clinic today stating she is doing okay.  Her  passed away yesterday and she feels she is doing okay.  She has no new side effects from treatment and does admit to some mild fatigue but she feels this is also related to caring for her .  She was unable to receive her Zometa last week related to a calcium level of 7.9 corrected to 8.3.  She is on calcium with vitamin D once a day.    Oncologic History:     12/31/2018   patient presented to the emergency room with vertigo and fatigue.  CT scan of the head was negative and subsequent stress test was negative.  5/3/2019  She was seen by her PCP who ordered lab work and noted a total protein of 12.9.  SPEP at that time showed an M spike of 6.2 with a large monoclonal protein seen in the gamma fraction.  Urine immunofixation showed a possible small protein band in the gamma fraction  5/31/2019  she was evaluated by Dr. Walker with Medical Oncology with plan to rule out myeloma and obtain bone marrow aspiration biopsy as well as a metastatic bone survey along with additional labwork  6/18/2019 she underwent bone marrow aspiration and biopsy  6/24/2019  She was seen again by Dr. Walker and CBC showed a hemoglobin of 9.3, M spike 7.3 with monoclonal IgG immunoglobulin of kappa light chain type; serum viscosity was 2.9; quantitative immunoglobulins showed an IgG of 8160, beta-2 microglobulin was 5.8, BUN  was 21 with creatinine is 0.8 and total protein was 13.7.  Quantitative kappa/lambda free light chains showed an elevated ratio of 17.0 bone marrow aspiration biopsy showed plasma cell myeloma with approximately 80% plasma cells.  Immunofixation showed IgG kappa and flow cytometry revealed kappa monotypic plasma cells consistent with clonal plasma cell neoplasm and FISH panel was pending at that time.  It was felt she had at least stage II disease based on her beta-2 microglobulin and anemia.  Plan was to treat with Velcade 1.3 mg per metered squared days 1, 4, 8 and 11/Decadron 40 mg on days 1, 8 and 15 initiation of Revlimid with the second cycle at 25 mg daily days 1 through 14.  Plan was to also obtain an MRI of the lumbar spine to rule out lytic lesion at L3.  She was initiated on Zovirax 400 mg p.o. twice daily.  6/25/2019  1st cycle of chemotherapy initiated  7/1/2019 note in chart regarding patient's large co-pay for the Revlimid and no plan at this point to initiate Revlimid and treat only with Velcade and Decadron per Dr. Walker  7/11/2019  MRI lumbar spine shows a pathologic superior endplate compression fracture at L3 without evidence of retropulsed fragment and innumerable enhancing lesions throughout the lumbar spine consistent with history of multiple myeloma.  9/10/2019  Increasing M-spike and kappa lambda ratio  10/1/2019  Initiation of CyBorD     Current Chemo Regime/TX:  Velcade 1.5mg.m2/cyclophosphamide 150mg every 7 days on days 1,8,15 and 22/decadron 40mg days 1,8,15,22       **Zometa 4mg every 3 months  Current Cycle: 12  # of completed cycles:  11     Previous treatment:   Velcade 1.3 mg/m2 days 1, 4, 8 and 11 with Decadron 40 mg days 1, 8 and 15 x 4 cycles     Past Medical History:   Diagnosis Date     Arthritis      Depressive disorder      Essential hypertension 10/1/2015     Major depressive disorder, recurrent episode, mild (H) 10/1/2015     Mixed hyperlipidemia 10/1/2015     Multiple  "myeloma not having achieved remission (H) 6/24/2019     Other specified disorders of bladder 07/09/2012    Irritable Bladder     Seasonal allergies 10/1/2015     Unspecified essential hypertension 03/19/2007     Unspecified sinusitis (chronic) 09/05/2007       Past Surgical History:   Procedure Laterality Date     APPENDECTOMY       BONE MARROW BIOPSY, BONE SPECIMEN, NEEDLE/TROCAR N/A 6/18/2019    Procedure: BONE MARROW BIOPSY;  Surgeon: Maciej Sanz MD;  Location: [facility] OR     CHOLECYSTECTOMY       COLONOSCOPY  07-    repeat 10 years     COLONOSCOPY N/A 12/30/2016    Procedure: COLONOSCOPY;  Surgeon: Bhaskar Franklin DO;  Location: [facility] OR     SINUS SURGERY       TUBAL STERILIZATION         Family History   Problem Relation Age of Onset     Breast Cancer Mother 66        Cause of Death     Parkinsonism Father         \"Possible\"     Coronary Artery Disease Father      Pacemaker Father      Thyroid Disease Daughter      Diabetes No family hx of      Hypertension No family hx of      Hyperlipidemia No family hx of      Cerebrovascular Disease No family hx of      Colon Cancer No family hx of      Prostate Cancer No family hx of      Genetic Disorder No family hx of      Asthma No family hx of      Anesthesia Reaction No family hx of        Social History     Socioeconomic History     Marital status:      Spouse name: Not on file     Number of children: Not on file     Years of education: Not on file     Highest education level: Not on file   Occupational History     Occupation: Financial     Comment:  - (FT)   Social Needs     Financial resource strain: Not on file     Food insecurity     Worry: Not on file     Inability: Not on file     Transportation needs     Medical: Not on file     Non-medical: Not on file   Tobacco Use     Smoking status: Never Smoker     Smokeless tobacco: Never Used     Tobacco comment: Tried to Quit (YES); QUIT in 1971; Passive Exposure (NO)   Substance and " Sexual Activity     Alcohol use: Yes     Comment: RARELY     Drug use: No     Sexual activity: Yes     Partners: Male     Birth control/protection: None   Lifestyle     Physical activity     Days per week: Not on file     Minutes per session: Not on file     Stress: Not on file   Relationships     Social connections     Talks on phone: Not on file     Gets together: Not on file     Attends Congregational service: Not on file     Active member of club or organization: Not on file     Attends meetings of clubs or organizations: Not on file     Relationship status: Not on file     Intimate partner violence     Fear of current or ex partner: Not on file     Emotionally abused: Not on file     Physically abused: Not on file     Forced sexual activity: Not on file   Other Topics Concern      Service Not Asked     Blood Transfusions Not Asked     Caffeine Concern Yes     Comment: Coffee >6 cups daily     Occupational Exposure Not Asked     Hobby Hazards Not Asked     Sleep Concern Not Asked     Stress Concern Not Asked     Weight Concern Not Asked     Special Diet Not Asked     Back Care Not Asked     Exercise Not Asked     Bike Helmet Not Asked     Seat Belt Not Asked     Self-Exams Not Asked     Parent/sibling w/ CABG, MI or angioplasty before 65F 55M? No   Social History Narrative     Not on file       Current Outpatient Medications   Medication     acyclovir (ZOVIRAX) 400 MG tablet     aspirin (ASA) 81 MG chewable tablet     atorvastatin (LIPITOR) 10 MG tablet     Bortezomib (VELCADE IJ)     cyclophosphamide 50 MG PO capsule     dexamethasone (DECADRON) 4 MG tablet     fluticasone (FLONASE) 50 MCG/ACT nasal spray     losartan (COZAAR) 50 MG tablet     sertraline (ZOLOFT) 50 MG tablet     EPINEPHrine (EPIPEN) 0.3 MG/0.3ML injection     LORazepam (ATIVAN) 0.5 MG tablet     ondansetron (ZOFRAN) 8 MG tablet     prochlorperazine (COMPAZINE) 10 MG tablet     No current facility-administered medications for this visit.      "    Allergies   Allergen Reactions     Lisinopril Cough     Phenylephrine Hcl Other (See Comments)     **Entex  HEADACHE (SEVERE)     Phenylpropanolamine Other (See Comments)     **Entex  HEADACHE (SEVERE)     Pseudoephedrine Tannate Other (See Comments)     **Entex  HEADACHE (SEVERE)     Levofloxacin Rash     **Levaquin     Moxifloxacin Hcl [Avelox] Rash       Review Of Systems:  Constitutional:    denies fever, weight changes, chills, and night sweats.  Eyes:    denies double vision  Ears/Nose/Throat:   denies ear pain, nose problems, difficulty swallowing  Respiratory:   denies shortness of breath, cough  Skin:   denies rash, lesions  Cardiovascular:   denies chest pain, palpitations, edema  Gastrointestinal:   denies abdominal pain, bloating, nausea, early satiety; no change in bowel habits or blood in stool  Genitourinary:   denies difficulty with urination, blood in urine  Musculoskeletal:    denies new muscle pain, bone pain  Neurologic:   denies lightheadedness, headaches, numbness or tingling  Psychiatric:  Feels she is doing okay  Hematologic/Lymphatic/Immunologic:   denies easy bruising, easy bleeding, lumps or bumps noted  Endocrine:   Denies increased thirst    ECOG Performance Status: 0    Physical Exam:  BP (!) 150/93   Pulse 83   Temp 97  F (36.1  C) (Tympanic)   Resp 18   Ht 1.6 m (5' 3\")   Wt 74.7 kg (164 lb 10.9 oz)   SpO2 98%   BMI 29.17 kg/m      GENERAL APPEARANCE: Healthy, alert and in no acute distress.  HEENT: Normocephalic, Sclerae anicteric. Oropharynx without ulcers, lesions, or thrush.  NECK:   No asymmetry or masses, no thyromegaly.  LYMPHATICS: No palpable cervical, supraclavicular, axillary, or inguinal nodes   RESP: Lungs clear to auscultation bilaterally, respirations regular and easy  CARDIOVASCULAR: Regular rate and rhythm. Normal S1, S2; no murmur, gallop, or rub.  ABDOMEN: Soft, nontender. Bowel sounds auscultated all 4 quadrants. No palpable organomegaly or " masses.  MUSCULOSKELETAL: Extremities without gross deformities noted. No edema of bilateral lower extremities.  NEURO: Alert and oriented x 3.  Gait steady.  PSYCHIATRIC: Mentation and affect appear normal.  Mood appropriate.    Laboratory:    Results for orders placed or performed in visit on 08/06/20   Comprehensive metabolic panel     Status: Abnormal   Result Value Ref Range    Sodium 135 133 - 144 mmol/L    Potassium 3.6 3.4 - 5.3 mmol/L    Chloride 105 94 - 109 mmol/L    Carbon Dioxide 26 20 - 32 mmol/L    Anion Gap 4 3 - 14 mmol/L    Glucose 142 (H) 70 - 99 mg/dL    Urea Nitrogen 13 7 - 30 mg/dL    Creatinine 0.70 0.52 - 1.04 mg/dL    GFR Estimate 87 >60 mL/min/[1.73_m2]    GFR Estimate If Black >90 >60 mL/min/[1.73_m2]    Calcium 8.6 8.5 - 10.1 mg/dL    Bilirubin Total 0.6 0.2 - 1.3 mg/dL    Albumin 3.5 3.4 - 5.0 g/dL    Protein Total 7.9 6.8 - 8.8 g/dL    Alkaline Phosphatase 98 40 - 150 U/L    ALT 23 0 - 50 U/L    AST 15 0 - 45 U/L   CBC with platelets differential     Status: Abnormal   Result Value Ref Range    WBC 5.3 4.0 - 11.0 10e9/L    RBC Count 3.66 (L) 3.8 - 5.2 10e12/L    Hemoglobin 11.7 11.7 - 15.7 g/dL    Hematocrit 34.0 (L) 35.0 - 47.0 %    MCV 93 78 - 100 fl    MCH 32.0 26.5 - 33.0 pg    MCHC 34.4 31.5 - 36.5 g/dL    RDW 14.9 10.0 - 15.0 %    Platelet Count 196 150 - 450 10e9/L    Diff Method Automated Method     % Neutrophils 53.3 %    % Lymphocytes 22.3 %    % Monocytes 17.4 %    % Eosinophils 3.6 %    % Basophils 0.6 %    % Immature Granulocytes 2.8 %    Nucleated RBCs 0 0 /100    Absolute Neutrophil 2.8 1.6 - 8.3 10e9/L    Absolute Lymphocytes 1.2 0.8 - 5.3 10e9/L    Absolute Monocytes 0.9 0.0 - 1.3 10e9/L    Absolute Eosinophils 0.2 0.0 - 0.7 10e9/L    Absolute Basophils 0.0 0.0 - 0.2 10e9/L    Abs Immature Granulocytes 0.2 0 - 0.4 10e9/L    Absolute Nucleated RBC 0.0        Component      Latest Ref Rng & Units 7/29/2020   Albumin Fraction      3.7 - 5.1 g/dL 4.0   Alpha 1 Fraction       0.2 - 0.4 g/dL 0.3   Alpha 2 Fraction      0.5 - 0.9 g/dL 0.7   Beta Fraction      0.6 - 1.0 g/dL 0.6   Gamma Fraction      0.7 - 1.6 g/dL 1.9 (H)   Monoclonal Peak      0.0 g/dL 1.7 (H)   ELP Interpretation:       Monoclonal protein (1.7 g/dL) seen in the gamma fraction.  Previously characterized in our . . .   [place] Free Lt Chain      0.33 - 1.94 mg/dL 0.65   Lambda Free Lt Chain      0.57 - 2.63 mg/dL 0.25 (L)   Kappa Lambda Ratio      0.26 - 1.65 2.60 (H)   IGG      610 - 1,616 mg/dL 2,045 (H)   IGA      84 - 499 mg/dL 22 (L)   IGM      35 - 242 mg/dL <10 (L)   Viscosity Index      1.4 - 1.8 1.6   Beta-2-Microglobulin      <2.3 mg/L 2.7 (H)     Imaging Studies:  None completed for today's visit       ASSESSMENT/PLAN:    #1 Multiple myeloma:   IgG kappa multiple myeloma with 80% involvement of the bone marrow diagnosed June 2019 initially treated with Velcade and Decadron and received 4 cycles with increasing M-spike and kappa/lambda ratio.  Treatment changed to CyBorD on 10/1/2019 with elevating LFT's felt to be related to the Cytoxan so this was decreased with cycle 3 day 8.  M-spike 1.7.   Will continue the same and she will initiate cycle 12 today and follow-up prior to cycle 13-day 1 with myeloma labs.       #2  Bone lesions: Continue Zometa every 3 months.  Will receive this today.  We will increase her calcium to twice daily.     I encouraged patient to call with any questions or concerns.     Barbie Quintana, NP  APRN, FNP-BC, AOCNP

## 2020-08-06 ENCOUNTER — INFUSION THERAPY VISIT (OUTPATIENT)
Dept: INFUSION THERAPY | Facility: OTHER | Age: 70
End: 2020-08-06
Attending: INTERNAL MEDICINE
Payer: MEDICARE

## 2020-08-06 ENCOUNTER — ONCOLOGY VISIT (OUTPATIENT)
Dept: ONCOLOGY | Facility: OTHER | Age: 70
End: 2020-08-06
Attending: NURSE PRACTITIONER
Payer: MEDICARE

## 2020-08-06 VITALS
TEMPERATURE: 97 F | SYSTOLIC BLOOD PRESSURE: 150 MMHG | HEIGHT: 63 IN | BODY MASS INDEX: 29.18 KG/M2 | WEIGHT: 164.68 LBS | OXYGEN SATURATION: 98 % | HEART RATE: 83 BPM | RESPIRATION RATE: 15 BRPM | DIASTOLIC BLOOD PRESSURE: 93 MMHG

## 2020-08-06 VITALS
DIASTOLIC BLOOD PRESSURE: 93 MMHG | RESPIRATION RATE: 18 BRPM | TEMPERATURE: 97 F | SYSTOLIC BLOOD PRESSURE: 150 MMHG | OXYGEN SATURATION: 98 % | HEIGHT: 63 IN | WEIGHT: 164.68 LBS | HEART RATE: 83 BPM | BODY MASS INDEX: 29.18 KG/M2

## 2020-08-06 DIAGNOSIS — C90.00 MULTIPLE MYELOMA NOT HAVING ACHIEVED REMISSION (H): Primary | ICD-10-CM

## 2020-08-06 DIAGNOSIS — M89.9 BONE LESION: ICD-10-CM

## 2020-08-06 LAB
ALBUMIN SERPL-MCNC: 3.5 G/DL (ref 3.4–5)
ALP SERPL-CCNC: 98 U/L (ref 40–150)
ALT SERPL W P-5'-P-CCNC: 23 U/L (ref 0–50)
ANION GAP SERPL CALCULATED.3IONS-SCNC: 4 MMOL/L (ref 3–14)
AST SERPL W P-5'-P-CCNC: 15 U/L (ref 0–45)
BASOPHILS # BLD AUTO: 0 10E9/L (ref 0–0.2)
BASOPHILS NFR BLD AUTO: 0.6 %
BILIRUB SERPL-MCNC: 0.6 MG/DL (ref 0.2–1.3)
BUN SERPL-MCNC: 13 MG/DL (ref 7–30)
CALCIUM SERPL-MCNC: 8.6 MG/DL (ref 8.5–10.1)
CHLORIDE SERPL-SCNC: 105 MMOL/L (ref 94–109)
CO2 SERPL-SCNC: 26 MMOL/L (ref 20–32)
CREAT SERPL-MCNC: 0.7 MG/DL (ref 0.52–1.04)
DIFFERENTIAL METHOD BLD: ABNORMAL
EOSINOPHIL # BLD AUTO: 0.2 10E9/L (ref 0–0.7)
EOSINOPHIL NFR BLD AUTO: 3.6 %
ERYTHROCYTE [DISTWIDTH] IN BLOOD BY AUTOMATED COUNT: 14.9 % (ref 10–15)
GFR SERPL CREATININE-BSD FRML MDRD: 87 ML/MIN/{1.73_M2}
GLUCOSE SERPL-MCNC: 142 MG/DL (ref 70–99)
HCT VFR BLD AUTO: 34 % (ref 35–47)
HGB BLD-MCNC: 11.7 G/DL (ref 11.7–15.7)
IMM GRANULOCYTES # BLD: 0.2 10E9/L (ref 0–0.4)
IMM GRANULOCYTES NFR BLD: 2.8 %
LYMPHOCYTES # BLD AUTO: 1.2 10E9/L (ref 0.8–5.3)
LYMPHOCYTES NFR BLD AUTO: 22.3 %
MCH RBC QN AUTO: 32 PG (ref 26.5–33)
MCHC RBC AUTO-ENTMCNC: 34.4 G/DL (ref 31.5–36.5)
MCV RBC AUTO: 93 FL (ref 78–100)
MONOCYTES # BLD AUTO: 0.9 10E9/L (ref 0–1.3)
MONOCYTES NFR BLD AUTO: 17.4 %
NEUTROPHILS # BLD AUTO: 2.8 10E9/L (ref 1.6–8.3)
NEUTROPHILS NFR BLD AUTO: 53.3 %
NRBC # BLD AUTO: 0 10*3/UL
NRBC BLD AUTO-RTO: 0 /100
PLATELET # BLD AUTO: 196 10E9/L (ref 150–450)
POTASSIUM SERPL-SCNC: 3.6 MMOL/L (ref 3.4–5.3)
PROT SERPL-MCNC: 7.9 G/DL (ref 6.8–8.8)
RBC # BLD AUTO: 3.66 10E12/L (ref 3.8–5.2)
SODIUM SERPL-SCNC: 135 MMOL/L (ref 133–144)
WBC # BLD AUTO: 5.3 10E9/L (ref 4–11)

## 2020-08-06 PROCEDURE — G0463 HOSPITAL OUTPT CLINIC VISIT: HCPCS

## 2020-08-06 PROCEDURE — 25000128 H RX IP 250 OP 636: Performed by: INTERNAL MEDICINE

## 2020-08-06 PROCEDURE — 85025 COMPLETE CBC W/AUTO DIFF WBC: CPT | Mod: ZL | Performed by: NURSE PRACTITIONER

## 2020-08-06 PROCEDURE — 96365 THER/PROPH/DIAG IV INF INIT: CPT

## 2020-08-06 PROCEDURE — 36415 COLL VENOUS BLD VENIPUNCTURE: CPT | Mod: ZL | Performed by: NURSE PRACTITIONER

## 2020-08-06 PROCEDURE — 96367 TX/PROPH/DG ADDL SEQ IV INF: CPT

## 2020-08-06 PROCEDURE — 96401 CHEMO ANTI-NEOPL SQ/IM: CPT

## 2020-08-06 PROCEDURE — 25000128 H RX IP 250 OP 636: Performed by: NURSE PRACTITIONER

## 2020-08-06 PROCEDURE — G0463 HOSPITAL OUTPT CLINIC VISIT: HCPCS | Mod: 25

## 2020-08-06 PROCEDURE — 25800030 ZZH RX IP 258 OP 636: Performed by: NURSE PRACTITIONER

## 2020-08-06 PROCEDURE — 99213 OFFICE O/P EST LOW 20 MIN: CPT | Mod: CR | Performed by: NURSE PRACTITIONER

## 2020-08-06 PROCEDURE — 80053 COMPREHEN METABOLIC PANEL: CPT | Mod: ZL | Performed by: NURSE PRACTITIONER

## 2020-08-06 RX ORDER — ONDANSETRON 4 MG/1
8 TABLET, FILM COATED ORAL ONCE
Status: COMPLETED | OUTPATIENT
Start: 2020-08-06 | End: 2020-08-06

## 2020-08-06 RX ORDER — ZOLEDRONIC ACID 0.04 MG/ML
4 INJECTION, SOLUTION INTRAVENOUS ONCE
Status: COMPLETED | OUTPATIENT
Start: 2020-08-06 | End: 2020-08-06

## 2020-08-06 RX ORDER — ZOLEDRONIC ACID 0.04 MG/ML
4 INJECTION, SOLUTION INTRAVENOUS ONCE
Status: CANCELLED | OUTPATIENT
Start: 2020-08-06

## 2020-08-06 RX ADMIN — SODIUM CHLORIDE 250 ML: 9 INJECTION, SOLUTION INTRAVENOUS at 10:59

## 2020-08-06 RX ADMIN — BORTEZOMIB 2.7 MG: 3.5 INJECTION, POWDER, LYOPHILIZED, FOR SOLUTION INTRAVENOUS; SUBCUTANEOUS at 11:02

## 2020-08-06 RX ADMIN — ONDANSETRON HYDROCHLORIDE 8 MG: 4 TABLET, FILM COATED ORAL at 11:01

## 2020-08-06 RX ADMIN — ZOLEDRONIC ACID 4 MG: 0.04 INJECTION, SOLUTION INTRAVENOUS at 10:59

## 2020-08-06 ASSESSMENT — PAIN SCALES - GENERAL
PAINLEVEL: NO PAIN (0)
PAINLEVEL: NO PAIN (0)

## 2020-08-06 ASSESSMENT — MIFFLIN-ST. JEOR
SCORE: 1231.13
SCORE: 1231.13

## 2020-08-06 NOTE — NURSING NOTE
"Chief Complaint   Patient presents with     RECHECK     Follow up Multiple myeloma not having achieved remission        Initial BP (!) 150/93   Pulse 83   Temp 97  F (36.1  C) (Tympanic)   Resp 18   Ht 1.6 m (5' 3\")   Wt 74.7 kg (164 lb 10.9 oz)   SpO2 98%   BMI 29.17 kg/m   Estimated body mass index is 29.17 kg/m  as calculated from the following:    Height as of this encounter: 1.6 m (5' 3\").    Weight as of this encounter: 74.7 kg (164 lb 10.9 oz).  Medication Reconciliation: complete.  Immunizations and advance directives status reviewed. Pain scale =0 , PHQ-2=1.            Vilma Chi LPN    "

## 2020-08-06 NOTE — PROGRESS NOTES
Patient is a 69 year old female here accompanied by self today for infusion of Zometa per order of Dr. Walker.  Patient identified with two identifiers, order verified, and verbal consent for today's infusion obtained from patient.      Today's lab values:  WBC: 5.3, HGB: 11.7, PLT: 196, ANC: 2.8, AST: 15, ALT: 23, Alkaline Phosphatase: 98, Creatinine: 0.70.      Patient meets order parameters for today's treatment.     24 gauge angio cath inserted into right upper forearm by Eve Garcia LPN.  Immediate blood return noted.  IV secured with sterile, transparent dressing and tape.  Patient tolerated well, denies pain or discomfort at this time.  Flushes easily without resistance, no signs or symptoms of infiltration or infection.   Patient denies questions or concerns regarding infusion and/or medication(s) being administered.    IV pump verified with dose, drug, and rate of administration.  Infusion administered per protocol.  Patient tolerated infusion well, no signs or symptoms of adverse reaction noted.  Patient denies pain nor discomfort.

## 2020-08-06 NOTE — PATIENT INSTRUCTIONS
We would like to see you back per your schedule.     When you are in need of a refill, please call your pharmacy and they will send us a request.     If you have any questions please call [phone]    Other instructions:  none

## 2020-08-06 NOTE — PROGRESS NOTES
IV removed, catheter intact.  Site clean, dry and intact.  No signs or symptoms of infiltration or infection.  Covered with a sterile bandage, slight pressure applied for 30 seconds.  Pt instructed to leave bandage intact for a minimum of one hour, and to call with questions or concerns.  Copy of appointments, discharge instructions, and after visit summary (AVS) provided to patient.  Patient states understanding, discharged.

## 2020-08-06 NOTE — PROGRESS NOTES
Patient is a 69 year old female here today for injection of Velcade per order of . Patient meets parameters for today's infusion. Patient identified with two identifiers, order verified, and verbal consent for today's infusion obtained from patient. Written consent for treatment is on file and valid.    Recent lab values: WBC: 5.3, HGB: 11.7, PLT: 196  ANC: 2.8. Patient meets order parameters for today's treatment.  Patient denies questions or concerns regarding injection and/or medication(s) being administered.  Velcade injected SQ into right upper outer arm at 45 degree angle per protocol rotating sites.     Injection administered per protocol. Patient tolerated infusion without incident, no signs or symptoms of adverse reaction noted. Patient denies pain or discomfort.     Covered with a sterile bandage. Pt instructed to leave bandage intact for a minimum of one hour, and to call with questions or concerns. Copy of appointments, discharge instructions, and after visit summary (AVS) provided to patient. Patient states understanding, discharged ambulatory.

## 2020-08-13 ENCOUNTER — INFUSION THERAPY VISIT (OUTPATIENT)
Dept: INFUSION THERAPY | Facility: OTHER | Age: 70
End: 2020-08-13
Attending: INTERNAL MEDICINE
Payer: MEDICARE

## 2020-08-13 VITALS
SYSTOLIC BLOOD PRESSURE: 135 MMHG | DIASTOLIC BLOOD PRESSURE: 82 MMHG | OXYGEN SATURATION: 98 % | HEIGHT: 63 IN | HEART RATE: 85 BPM | TEMPERATURE: 98.4 F | BODY MASS INDEX: 29.18 KG/M2 | WEIGHT: 164.68 LBS

## 2020-08-13 DIAGNOSIS — C90.00 MULTIPLE MYELOMA NOT HAVING ACHIEVED REMISSION (H): Primary | ICD-10-CM

## 2020-08-13 LAB
BASOPHILS # BLD AUTO: 0 10E9/L (ref 0–0.2)
BASOPHILS NFR BLD AUTO: 0.5 %
DIFFERENTIAL METHOD BLD: ABNORMAL
EOSINOPHIL # BLD AUTO: 0.2 10E9/L (ref 0–0.7)
EOSINOPHIL NFR BLD AUTO: 3 %
ERYTHROCYTE [DISTWIDTH] IN BLOOD BY AUTOMATED COUNT: 14.4 % (ref 10–15)
HCT VFR BLD AUTO: 34 % (ref 35–47)
HGB BLD-MCNC: 11.6 G/DL (ref 11.7–15.7)
IMM GRANULOCYTES # BLD: 0.1 10E9/L (ref 0–0.4)
IMM GRANULOCYTES NFR BLD: 1.9 %
LYMPHOCYTES # BLD AUTO: 1.1 10E9/L (ref 0.8–5.3)
LYMPHOCYTES NFR BLD AUTO: 19.7 %
MCH RBC QN AUTO: 31.7 PG (ref 26.5–33)
MCHC RBC AUTO-ENTMCNC: 34.1 G/DL (ref 31.5–36.5)
MCV RBC AUTO: 93 FL (ref 78–100)
MONOCYTES # BLD AUTO: 1.1 10E9/L (ref 0–1.3)
MONOCYTES NFR BLD AUTO: 18.4 %
NEUTROPHILS # BLD AUTO: 3.3 10E9/L (ref 1.6–8.3)
NEUTROPHILS NFR BLD AUTO: 56.5 %
NRBC # BLD AUTO: 0 10*3/UL
NRBC BLD AUTO-RTO: 0 /100
PLATELET # BLD AUTO: 217 10E9/L (ref 150–450)
RBC # BLD AUTO: 3.66 10E12/L (ref 3.8–5.2)
WBC # BLD AUTO: 5.8 10E9/L (ref 4–11)

## 2020-08-13 PROCEDURE — 85025 COMPLETE CBC W/AUTO DIFF WBC: CPT | Mod: ZL | Performed by: NURSE PRACTITIONER

## 2020-08-13 PROCEDURE — 36415 COLL VENOUS BLD VENIPUNCTURE: CPT | Mod: ZL | Performed by: NURSE PRACTITIONER

## 2020-08-13 PROCEDURE — 96401 CHEMO ANTI-NEOPL SQ/IM: CPT

## 2020-08-13 PROCEDURE — 25000128 H RX IP 250 OP 636: Performed by: NURSE PRACTITIONER

## 2020-08-13 RX ORDER — ONDANSETRON 4 MG/1
8 TABLET, FILM COATED ORAL ONCE
Status: COMPLETED | OUTPATIENT
Start: 2020-08-13 | End: 2020-08-13

## 2020-08-13 RX ADMIN — ONDANSETRON HYDROCHLORIDE 8 MG: 4 TABLET, FILM COATED ORAL at 12:12

## 2020-08-13 RX ADMIN — BORTEZOMIB 2.7 MG: 3.5 INJECTION, POWDER, LYOPHILIZED, FOR SOLUTION INTRAVENOUS; SUBCUTANEOUS at 12:14

## 2020-08-13 ASSESSMENT — MIFFLIN-ST. JEOR: SCORE: 1231

## 2020-08-13 NOTE — PROGRESS NOTES
Patient is a 69 year old female here today for injection of Velcade per order of . Patient meets parameters for today's infusion. Patient identified with two identifiers, order verified, and verbal consent for today's infusion obtained from patient. Written consent for treatment is on file and valid.    Recent lab values: WBC: 5.8, HGB: 11.6, PLT: 217  ANC: 3.3. Patient meets order parameters for today's treatment.  Patient denies questions or concerns regarding injection and/or medication(s) being administered.  Velcade injected SQ into left upper outer arm per protocol rotating sites.     Injection administered per protocol. Patient tolerated infusion without incident, no signs or symptoms of adverse reaction noted. Patient denies pain or discomfort.     Covered with a sterile bandage. Pt instructed to leave bandage intact for a minimum of one hour, and to call with questions or concerns. Copy of appointments, discharge instructions, and after visit summary (AVS) provided to patient. Patient states understanding, discharged ambulatory.

## 2020-08-20 ENCOUNTER — INFUSION THERAPY VISIT (OUTPATIENT)
Dept: INFUSION THERAPY | Facility: OTHER | Age: 70
End: 2020-08-20
Attending: INTERNAL MEDICINE
Payer: MEDICARE

## 2020-08-20 VITALS
RESPIRATION RATE: 18 BRPM | HEART RATE: 78 BPM | HEIGHT: 63 IN | OXYGEN SATURATION: 97 % | TEMPERATURE: 97.7 F | SYSTOLIC BLOOD PRESSURE: 122 MMHG | BODY MASS INDEX: 29.06 KG/M2 | WEIGHT: 164.02 LBS | DIASTOLIC BLOOD PRESSURE: 86 MMHG

## 2020-08-20 DIAGNOSIS — C90.00 MULTIPLE MYELOMA NOT HAVING ACHIEVED REMISSION (H): Primary | ICD-10-CM

## 2020-08-20 LAB
BASOPHILS # BLD AUTO: 0 10E9/L (ref 0–0.2)
BASOPHILS NFR BLD AUTO: 0.4 %
DIFFERENTIAL METHOD BLD: ABNORMAL
EOSINOPHIL # BLD AUTO: 0.2 10E9/L (ref 0–0.7)
EOSINOPHIL NFR BLD AUTO: 2.6 %
ERYTHROCYTE [DISTWIDTH] IN BLOOD BY AUTOMATED COUNT: 14.3 % (ref 10–15)
HCT VFR BLD AUTO: 35 % (ref 35–47)
HGB BLD-MCNC: 12 G/DL (ref 11.7–15.7)
IMM GRANULOCYTES # BLD: 0.1 10E9/L (ref 0–0.4)
IMM GRANULOCYTES NFR BLD: 1.9 %
LYMPHOCYTES # BLD AUTO: 1.4 10E9/L (ref 0.8–5.3)
LYMPHOCYTES NFR BLD AUTO: 19.6 %
MCH RBC QN AUTO: 31.8 PG (ref 26.5–33)
MCHC RBC AUTO-ENTMCNC: 34.3 G/DL (ref 31.5–36.5)
MCV RBC AUTO: 93 FL (ref 78–100)
MONOCYTES # BLD AUTO: 1 10E9/L (ref 0–1.3)
MONOCYTES NFR BLD AUTO: 14.3 %
NEUTROPHILS # BLD AUTO: 4.2 10E9/L (ref 1.6–8.3)
NEUTROPHILS NFR BLD AUTO: 61.2 %
NRBC # BLD AUTO: 0 10*3/UL
NRBC BLD AUTO-RTO: 0 /100
PLATELET # BLD AUTO: 270 10E9/L (ref 150–450)
RBC # BLD AUTO: 3.77 10E12/L (ref 3.8–5.2)
WBC # BLD AUTO: 6.9 10E9/L (ref 4–11)

## 2020-08-20 PROCEDURE — 25000128 H RX IP 250 OP 636: Performed by: NURSE PRACTITIONER

## 2020-08-20 PROCEDURE — 36415 COLL VENOUS BLD VENIPUNCTURE: CPT | Mod: ZL | Performed by: NURSE PRACTITIONER

## 2020-08-20 PROCEDURE — 96401 CHEMO ANTI-NEOPL SQ/IM: CPT

## 2020-08-20 PROCEDURE — 25000128 H RX IP 250 OP 636

## 2020-08-20 PROCEDURE — 85025 COMPLETE CBC W/AUTO DIFF WBC: CPT | Mod: ZL | Performed by: NURSE PRACTITIONER

## 2020-08-20 RX ORDER — ONDANSETRON 4 MG/1
8 TABLET, FILM COATED ORAL ONCE
Status: COMPLETED | OUTPATIENT
Start: 2020-08-20 | End: 2020-08-20

## 2020-08-20 RX ADMIN — ONDANSETRON HYDROCHLORIDE 8 MG: 4 TABLET, FILM COATED ORAL at 11:28

## 2020-08-20 RX ADMIN — BORTEZOMIB 2.7 MG: 3.5 INJECTION, POWDER, LYOPHILIZED, FOR SOLUTION INTRAVENOUS; SUBCUTANEOUS at 12:27

## 2020-08-20 ASSESSMENT — PAIN SCALES - GENERAL: PAINLEVEL: NO PAIN (0)

## 2020-08-20 ASSESSMENT — MIFFLIN-ST. JEOR: SCORE: 1228

## 2020-08-20 NOTE — PROGRESS NOTES
Patient is a 69 year old female here today for injection of Velcade per order of . Patient meets parameters for today's infusion. Patient identified with two identifiers, order verified, and verbal consent for today's infusion obtained from patient. Written consent for treatment is on file and valid.    Today's lab values: WBC: 6.9, HGB: 12, PLT: 270  ANC: 4.2.     Patient meets order parameters for today's treatment.    Patient denies questions or concerns regarding injection and/or medication(s) being administered.    Independent dose check completed with Barron RICH RN.    Velcade injected SQ into right upper outer arm at 45 degree angle per protocol rotating sites. Patient tolerated injection without incident, no signs or symptoms of adverse reaction noted. Patient denies pain or discomfort.     Covered with a sterile bandage. Pt instructed to leave bandage intact for a minimum of one hour, and to call with questions or concerns. Patient states understanding, discharged.

## 2020-08-27 ENCOUNTER — INFUSION THERAPY VISIT (OUTPATIENT)
Dept: INFUSION THERAPY | Facility: OTHER | Age: 70
End: 2020-08-27
Attending: INTERNAL MEDICINE
Payer: MEDICARE

## 2020-08-27 VITALS
HEIGHT: 63 IN | BODY MASS INDEX: 28.98 KG/M2 | WEIGHT: 163.58 LBS | HEART RATE: 85 BPM | SYSTOLIC BLOOD PRESSURE: 106 MMHG | DIASTOLIC BLOOD PRESSURE: 80 MMHG | TEMPERATURE: 98.9 F | OXYGEN SATURATION: 96 %

## 2020-08-27 DIAGNOSIS — C90.00 MULTIPLE MYELOMA NOT HAVING ACHIEVED REMISSION (H): Primary | ICD-10-CM

## 2020-08-27 LAB
BASOPHILS # BLD AUTO: 0 10E9/L (ref 0–0.2)
BASOPHILS NFR BLD AUTO: 0.2 %
DIFFERENTIAL METHOD BLD: ABNORMAL
EOSINOPHIL # BLD AUTO: 0.1 10E9/L (ref 0–0.7)
EOSINOPHIL NFR BLD AUTO: 2.1 %
ERYTHROCYTE [DISTWIDTH] IN BLOOD BY AUTOMATED COUNT: 14.1 % (ref 10–15)
HCT VFR BLD AUTO: 35.4 % (ref 35–47)
HGB BLD-MCNC: 12.2 G/DL (ref 11.7–15.7)
IMM GRANULOCYTES # BLD: 0.1 10E9/L (ref 0–0.4)
IMM GRANULOCYTES NFR BLD: 1.1 %
LYMPHOCYTES # BLD AUTO: 1.2 10E9/L (ref 0.8–5.3)
LYMPHOCYTES NFR BLD AUTO: 21.3 %
MCH RBC QN AUTO: 32.3 PG (ref 26.5–33)
MCHC RBC AUTO-ENTMCNC: 34.5 G/DL (ref 31.5–36.5)
MCV RBC AUTO: 94 FL (ref 78–100)
MONOCYTES # BLD AUTO: 0.9 10E9/L (ref 0–1.3)
MONOCYTES NFR BLD AUTO: 16.5 %
NEUTROPHILS # BLD AUTO: 3.3 10E9/L (ref 1.6–8.3)
NEUTROPHILS NFR BLD AUTO: 58.8 %
NRBC # BLD AUTO: 0 10*3/UL
NRBC BLD AUTO-RTO: 0 /100
PLATELET # BLD AUTO: 225 10E9/L (ref 150–450)
RBC # BLD AUTO: 3.78 10E12/L (ref 3.8–5.2)
WBC # BLD AUTO: 5.6 10E9/L (ref 4–11)

## 2020-08-27 PROCEDURE — 82784 ASSAY IGA/IGD/IGG/IGM EACH: CPT | Mod: ZL | Performed by: NURSE PRACTITIONER

## 2020-08-27 PROCEDURE — 84165 PROTEIN E-PHORESIS SERUM: CPT | Mod: ZL | Performed by: NURSE PRACTITIONER

## 2020-08-27 PROCEDURE — 00000402 ZZHCL STATISTIC TOTAL PROTEIN: Mod: ZL | Performed by: NURSE PRACTITIONER

## 2020-08-27 PROCEDURE — 36415 COLL VENOUS BLD VENIPUNCTURE: CPT | Mod: ZL | Performed by: NURSE PRACTITIONER

## 2020-08-27 PROCEDURE — 25000128 H RX IP 250 OP 636: Performed by: NURSE PRACTITIONER

## 2020-08-27 PROCEDURE — 85810 BLOOD VISCOSITY EXAMINATION: CPT | Mod: ZL | Performed by: NURSE PRACTITIONER

## 2020-08-27 PROCEDURE — 96401 CHEMO ANTI-NEOPL SQ/IM: CPT

## 2020-08-27 PROCEDURE — 82232 ASSAY OF BETA-2 PROTEIN: CPT | Mod: ZL | Performed by: NURSE PRACTITIONER

## 2020-08-27 PROCEDURE — 83883 ASSAY NEPHELOMETRY NOT SPEC: CPT | Mod: ZL | Performed by: NURSE PRACTITIONER

## 2020-08-27 PROCEDURE — 85025 COMPLETE CBC W/AUTO DIFF WBC: CPT | Mod: ZL | Performed by: NURSE PRACTITIONER

## 2020-08-27 RX ORDER — ONDANSETRON 4 MG/1
8 TABLET, FILM COATED ORAL ONCE
Status: COMPLETED | OUTPATIENT
Start: 2020-08-27 | End: 2020-08-27

## 2020-08-27 RX ADMIN — BORTEZOMIB 2.7 MG: 3.5 INJECTION, POWDER, LYOPHILIZED, FOR SOLUTION INTRAVENOUS; SUBCUTANEOUS at 12:16

## 2020-08-27 RX ADMIN — ONDANSETRON HYDROCHLORIDE 8 MG: 4 TABLET, FILM COATED ORAL at 12:14

## 2020-08-27 ASSESSMENT — MIFFLIN-ST. JEOR: SCORE: 1226

## 2020-08-27 NOTE — PROGRESS NOTES
Patient is a 69 year old female here today for injection of Velcade per order of . Patient meets parameters for today's infusion. Patient identified with two identifiers, order verified, and verbal consent for today's infusion obtained from patient. Written consent for treatment is on file and valid.    Recent lab values: WBC: 5.6, HGB: 12.2, PLT: 225  ANC: 3.3. Patient meets order parameters for today's treatment.  Patient denies questions or concerns regarding injection and/or medication(s) being administered.  Velcade injected SQ into left upper outer arm per protocol rotating sites.     Injection administered per protocol. Patient tolerated infusion without incident, no signs or symptoms of adverse reaction noted. Patient denies pain or discomfort.     Covered with a sterile bandage. Pt instructed to leave bandage intact for a minimum of one hour, and to call with questions or concerns. Copy of appointments, discharge instructions, and after visit summary (AVS) provided to patient. Patient states understanding, discharged ambulatory.

## 2020-08-28 LAB
ALBUMIN SERPL ELPH-MCNC: 4.2 G/DL (ref 3.7–5.1)
ALPHA1 GLOB SERPL ELPH-MCNC: 0.3 G/DL (ref 0.2–0.4)
ALPHA2 GLOB SERPL ELPH-MCNC: 0.8 G/DL (ref 0.5–0.9)
B-GLOBULIN SERPL ELPH-MCNC: 0.6 G/DL (ref 0.6–1)
B2 MICROGLOB SERPL-MCNC: 3.1 MG/L
GAMMA GLOB SERPL ELPH-MCNC: 2.1 G/DL (ref 0.7–1.6)
IGA SERPL-MCNC: 10 MG/DL (ref 84–499)
IGG SERPL-MCNC: 2277 MG/DL (ref 610–1616)
IGM SERPL-MCNC: 11 MG/DL (ref 35–242)
KAPPA LC UR-MCNC: 0.7 MG/DL (ref 0.33–1.94)
KAPPA LC/LAMBDA SER: 2.59 {RATIO} (ref 0.26–1.65)
LAMBDA LC SERPL-MCNC: 0.27 MG/DL (ref 0.57–2.63)
M PROTEIN SERPL ELPH-MCNC: 1.9 G/DL
PROT PATTERN SERPL ELPH-IMP: ABNORMAL
VISC SER: 1.7 CP (ref 1.4–1.8)

## 2020-08-31 RX ORDER — ONDANSETRON 4 MG/1
8 TABLET, FILM COATED ORAL ONCE
Status: CANCELLED
Start: 2020-09-24

## 2020-08-31 RX ORDER — EPINEPHRINE 0.3 MG/.3ML
0.3 INJECTION SUBCUTANEOUS EVERY 5 MIN PRN
Status: CANCELLED | OUTPATIENT
Start: 2020-09-17

## 2020-08-31 RX ORDER — ALBUTEROL SULFATE 90 UG/1
1-2 AEROSOL, METERED RESPIRATORY (INHALATION)
Status: CANCELLED
Start: 2020-09-17

## 2020-08-31 RX ORDER — MEPERIDINE HYDROCHLORIDE 25 MG/ML
25 INJECTION INTRAMUSCULAR; INTRAVENOUS; SUBCUTANEOUS EVERY 30 MIN PRN
Status: CANCELLED | OUTPATIENT
Start: 2020-09-17

## 2020-08-31 RX ORDER — MEPERIDINE HYDROCHLORIDE 25 MG/ML
25 INJECTION INTRAMUSCULAR; INTRAVENOUS; SUBCUTANEOUS EVERY 30 MIN PRN
Status: CANCELLED | OUTPATIENT
Start: 2020-09-03

## 2020-08-31 RX ORDER — ALBUTEROL SULFATE 0.83 MG/ML
2.5 SOLUTION RESPIRATORY (INHALATION)
Status: CANCELLED | OUTPATIENT
Start: 2020-09-24

## 2020-08-31 RX ORDER — NALOXONE HYDROCHLORIDE 0.4 MG/ML
.1-.4 INJECTION, SOLUTION INTRAMUSCULAR; INTRAVENOUS; SUBCUTANEOUS
Status: CANCELLED | OUTPATIENT
Start: 2020-09-03

## 2020-08-31 RX ORDER — NALOXONE HYDROCHLORIDE 0.4 MG/ML
.1-.4 INJECTION, SOLUTION INTRAMUSCULAR; INTRAVENOUS; SUBCUTANEOUS
Status: CANCELLED | OUTPATIENT
Start: 2020-09-24

## 2020-08-31 RX ORDER — METHYLPREDNISOLONE SODIUM SUCCINATE 125 MG/2ML
125 INJECTION, POWDER, LYOPHILIZED, FOR SOLUTION INTRAMUSCULAR; INTRAVENOUS
Status: CANCELLED
Start: 2020-09-24

## 2020-08-31 RX ORDER — MEPERIDINE HYDROCHLORIDE 25 MG/ML
25 INJECTION INTRAMUSCULAR; INTRAVENOUS; SUBCUTANEOUS EVERY 30 MIN PRN
Status: CANCELLED | OUTPATIENT
Start: 2020-09-24

## 2020-08-31 RX ORDER — ALBUTEROL SULFATE 0.83 MG/ML
2.5 SOLUTION RESPIRATORY (INHALATION)
Status: CANCELLED | OUTPATIENT
Start: 2020-09-03

## 2020-08-31 RX ORDER — LORAZEPAM 2 MG/ML
0.5 INJECTION INTRAMUSCULAR EVERY 4 HOURS PRN
Status: CANCELLED
Start: 2020-09-10

## 2020-08-31 RX ORDER — DIPHENHYDRAMINE HYDROCHLORIDE 50 MG/ML
50 INJECTION INTRAMUSCULAR; INTRAVENOUS
Status: CANCELLED
Start: 2020-09-17

## 2020-08-31 RX ORDER — SODIUM CHLORIDE 9 MG/ML
1000 INJECTION, SOLUTION INTRAVENOUS CONTINUOUS PRN
Status: CANCELLED
Start: 2020-09-03

## 2020-08-31 RX ORDER — EPINEPHRINE 1 MG/ML
0.3 INJECTION, SOLUTION, CONCENTRATE INTRAVENOUS EVERY 5 MIN PRN
Status: CANCELLED | OUTPATIENT
Start: 2020-09-24

## 2020-08-31 RX ORDER — ALBUTEROL SULFATE 90 UG/1
1-2 AEROSOL, METERED RESPIRATORY (INHALATION)
Status: CANCELLED
Start: 2020-09-10

## 2020-08-31 RX ORDER — EPINEPHRINE 1 MG/ML
0.3 INJECTION, SOLUTION, CONCENTRATE INTRAVENOUS EVERY 5 MIN PRN
Status: CANCELLED | OUTPATIENT
Start: 2020-09-03

## 2020-08-31 RX ORDER — METHYLPREDNISOLONE SODIUM SUCCINATE 125 MG/2ML
125 INJECTION, POWDER, LYOPHILIZED, FOR SOLUTION INTRAMUSCULAR; INTRAVENOUS
Status: CANCELLED
Start: 2020-09-17

## 2020-08-31 RX ORDER — ALBUTEROL SULFATE 0.83 MG/ML
2.5 SOLUTION RESPIRATORY (INHALATION)
Status: CANCELLED | OUTPATIENT
Start: 2020-09-17

## 2020-08-31 RX ORDER — METHYLPREDNISOLONE SODIUM SUCCINATE 125 MG/2ML
125 INJECTION, POWDER, LYOPHILIZED, FOR SOLUTION INTRAMUSCULAR; INTRAVENOUS
Status: CANCELLED
Start: 2020-09-10

## 2020-08-31 RX ORDER — DIPHENHYDRAMINE HYDROCHLORIDE 50 MG/ML
50 INJECTION INTRAMUSCULAR; INTRAVENOUS
Status: CANCELLED
Start: 2020-09-24

## 2020-08-31 RX ORDER — SODIUM CHLORIDE 9 MG/ML
1000 INJECTION, SOLUTION INTRAVENOUS CONTINUOUS PRN
Status: CANCELLED
Start: 2020-09-10

## 2020-08-31 RX ORDER — EPINEPHRINE 1 MG/ML
0.3 INJECTION, SOLUTION, CONCENTRATE INTRAVENOUS EVERY 5 MIN PRN
Status: CANCELLED | OUTPATIENT
Start: 2020-09-10

## 2020-08-31 RX ORDER — NALOXONE HYDROCHLORIDE 0.4 MG/ML
.1-.4 INJECTION, SOLUTION INTRAMUSCULAR; INTRAVENOUS; SUBCUTANEOUS
Status: CANCELLED | OUTPATIENT
Start: 2020-09-10

## 2020-08-31 RX ORDER — MEPERIDINE HYDROCHLORIDE 25 MG/ML
25 INJECTION INTRAMUSCULAR; INTRAVENOUS; SUBCUTANEOUS EVERY 30 MIN PRN
Status: CANCELLED | OUTPATIENT
Start: 2020-09-10

## 2020-08-31 RX ORDER — ONDANSETRON 4 MG/1
8 TABLET, FILM COATED ORAL ONCE
Status: CANCELLED | OUTPATIENT
Start: 2020-09-10

## 2020-08-31 RX ORDER — DIPHENHYDRAMINE HYDROCHLORIDE 50 MG/ML
50 INJECTION INTRAMUSCULAR; INTRAVENOUS
Status: CANCELLED
Start: 2020-09-03

## 2020-08-31 RX ORDER — ALBUTEROL SULFATE 90 UG/1
1-2 AEROSOL, METERED RESPIRATORY (INHALATION)
Status: CANCELLED
Start: 2020-09-03

## 2020-08-31 RX ORDER — SODIUM CHLORIDE 9 MG/ML
1000 INJECTION, SOLUTION INTRAVENOUS CONTINUOUS PRN
Status: CANCELLED
Start: 2020-09-24

## 2020-08-31 RX ORDER — DIPHENHYDRAMINE HYDROCHLORIDE 50 MG/ML
50 INJECTION INTRAMUSCULAR; INTRAVENOUS
Status: CANCELLED
Start: 2020-09-10

## 2020-08-31 RX ORDER — METHYLPREDNISOLONE SODIUM SUCCINATE 125 MG/2ML
125 INJECTION, POWDER, LYOPHILIZED, FOR SOLUTION INTRAMUSCULAR; INTRAVENOUS
Status: CANCELLED
Start: 2020-09-03

## 2020-08-31 RX ORDER — LORAZEPAM 2 MG/ML
0.5 INJECTION INTRAMUSCULAR EVERY 4 HOURS PRN
Status: CANCELLED
Start: 2020-09-17

## 2020-08-31 RX ORDER — ONDANSETRON 4 MG/1
8 TABLET, FILM COATED ORAL ONCE
Status: CANCELLED | OUTPATIENT
Start: 2020-09-03

## 2020-08-31 RX ORDER — NALOXONE HYDROCHLORIDE 0.4 MG/ML
.1-.4 INJECTION, SOLUTION INTRAMUSCULAR; INTRAVENOUS; SUBCUTANEOUS
Status: CANCELLED | OUTPATIENT
Start: 2020-09-17

## 2020-08-31 RX ORDER — ONDANSETRON 4 MG/1
8 TABLET, FILM COATED ORAL ONCE
Status: CANCELLED | OUTPATIENT
Start: 2020-09-17

## 2020-08-31 RX ORDER — EPINEPHRINE 0.3 MG/.3ML
0.3 INJECTION SUBCUTANEOUS EVERY 5 MIN PRN
Status: CANCELLED | OUTPATIENT
Start: 2020-09-03

## 2020-08-31 RX ORDER — ALBUTEROL SULFATE 0.83 MG/ML
2.5 SOLUTION RESPIRATORY (INHALATION)
Status: CANCELLED | OUTPATIENT
Start: 2020-09-10

## 2020-08-31 RX ORDER — SODIUM CHLORIDE 9 MG/ML
1000 INJECTION, SOLUTION INTRAVENOUS CONTINUOUS PRN
Status: CANCELLED
Start: 2020-09-17

## 2020-08-31 RX ORDER — EPINEPHRINE 0.3 MG/.3ML
0.3 INJECTION SUBCUTANEOUS EVERY 5 MIN PRN
Status: CANCELLED | OUTPATIENT
Start: 2020-09-10

## 2020-08-31 RX ORDER — EPINEPHRINE 0.3 MG/.3ML
0.3 INJECTION SUBCUTANEOUS EVERY 5 MIN PRN
Status: CANCELLED | OUTPATIENT
Start: 2020-09-24

## 2020-08-31 RX ORDER — LORAZEPAM 2 MG/ML
0.5 INJECTION INTRAMUSCULAR EVERY 4 HOURS PRN
Status: CANCELLED
Start: 2020-09-24

## 2020-08-31 RX ORDER — EPINEPHRINE 1 MG/ML
0.3 INJECTION, SOLUTION, CONCENTRATE INTRAVENOUS EVERY 5 MIN PRN
Status: CANCELLED | OUTPATIENT
Start: 2020-09-17

## 2020-08-31 RX ORDER — LORAZEPAM 2 MG/ML
0.5 INJECTION INTRAMUSCULAR EVERY 4 HOURS PRN
Status: CANCELLED
Start: 2020-09-03

## 2020-08-31 RX ORDER — ALBUTEROL SULFATE 90 UG/1
1-2 AEROSOL, METERED RESPIRATORY (INHALATION)
Status: CANCELLED
Start: 2020-09-24

## 2020-08-31 NOTE — PROGRESS NOTES
"Oncology Follow-up Visit:  September 3, 2020    Reason for Visit:  Patient presents with:  RECHECK: Follow up Multiple myeloma      Nursing Note and documentation reviewed: yes    HPI: This is a 69-year-old female patient who presents to the oncology clinic today for evaluation prior to receiving cycle 13 chemotherapy for Stage II-RISS IgG multiple myeloma diagnosed June 2019.  She progressed on Velcade and dexamethasone and is now receiving CyBorD with decreased dose of cytoxan related to LFT elevation.       She presents to the clinic stating that she is doing okay.  She is having somewhat of a hard time adjusting to the death of her  as it is somewhat lonely at times.  She does have a good support system.  She admits to gaining weight and being somewhat of a \"stress eater\".  She was feeling very fatigued after the death of her  but states that this seems to have improved somewhat also.    Oncologic History:     12/31/2018   patient presented to the emergency room with vertigo and fatigue.  CT scan of the head was negative and subsequent stress test was negative.  5/3/2019  She was seen by her PCP who ordered lab work and noted a total protein of 12.9.  SPEP at that time showed an M spike of 6.2 with a large monoclonal protein seen in the gamma fraction.  Urine immunofixation showed a possible small protein band in the gamma fraction  5/31/2019  she was evaluated by Dr. Walker with Medical Oncology with plan to rule out myeloma and obtain bone marrow aspiration biopsy as well as a metastatic bone survey along with additional labwork  6/18/2019 she underwent bone marrow aspiration and biopsy  6/24/2019  She was seen again by Dr. Walker and CBC showed a hemoglobin of 9.3, M spike 7.3 with monoclonal IgG immunoglobulin of kappa light chain type; serum viscosity was 2.9; quantitative immunoglobulins showed an IgG of 8160, beta-2 microglobulin was 5.8, BUN was 21 with creatinine is 0.8 and total " protein was 13.7.  Quantitative kappa/lambda free light chains showed an elevated ratio of 17.0 bone marrow aspiration biopsy showed plasma cell myeloma with approximately 80% plasma cells.  Immunofixation showed IgG kappa and flow cytometry revealed kappa monotypic plasma cells consistent with clonal plasma cell neoplasm and FISH panel was pending at that time.  It was felt she had at least stage II disease based on her beta-2 microglobulin and anemia.  Plan was to treat with Velcade 1.3 mg per metered squared days 1, 4, 8 and 11/Decadron 40 mg on days 1, 8 and 15 initiation of Revlimid with the second cycle at 25 mg daily days 1 through 14.  Plan was to also obtain an MRI of the lumbar spine to rule out lytic lesion at L3.  She was initiated on Zovirax 400 mg p.o. twice daily.  6/25/2019  1st cycle of chemotherapy initiated  7/1/2019 note in chart regarding patient's large co-pay for the Revlimid and no plan at this point to initiate Revlimid and treat only with Velcade and Decadron per Dr. Walker  7/11/2019  MRI lumbar spine shows a pathologic superior endplate compression fracture at L3 without evidence of retropulsed fragment and innumerable enhancing lesions throughout the lumbar spine consistent with history of multiple myeloma.  9/10/2019  Increasing M-spike and kappa lambda ratio  10/1/2019  Initiation of CyBorD     Current Chemo Regime/TX:  Velcade 1.5mg.m2/cyclophosphamide 150mg every 7 days on days 1,8,15 and 22/decadron 40mg days 1,8,15,22       **Zometa 4mg every 3 months  Current Cycle: 13  # of completed cycles:  12     Previous treatment:   Velcade 1.3 mg/m2 days 1, 4, 8 and 11 with Decadron 40 mg days 1, 8 and 15 x 4 cycles     Past Medical History:   Diagnosis Date     Arthritis      Depressive disorder      Essential hypertension 10/1/2015     Major depressive disorder, recurrent episode, mild (H) 10/1/2015     Mixed hyperlipidemia 10/1/2015     Multiple myeloma not having achieved remission  "(H) 6/24/2019     Other specified disorders of bladder 07/09/2012    Irritable Bladder     Seasonal allergies 10/1/2015     Unspecified essential hypertension 03/19/2007     Unspecified sinusitis (chronic) 09/05/2007       Past Surgical History:   Procedure Laterality Date     APPENDECTOMY       BONE MARROW BIOPSY, BONE SPECIMEN, NEEDLE/TROCAR N/A 6/18/2019    Procedure: BONE MARROW BIOPSY;  Surgeon: Maciej Sanz MD;  Location: [facility]     CHOLECYSTECTOMY       COLONOSCOPY  07-    repeat 10 years     COLONOSCOPY N/A 12/30/2016    Procedure: COLONOSCOPY;  Surgeon: Bhaskar Franklin DO;  Location: [facility]     SINUS SURGERY       TUBAL STERILIZATION         Family History   Problem Relation Age of Onset     Breast Cancer Mother 66        Cause of Death     Parkinsonism Father         \"Possible\"     Coronary Artery Disease Father      Pacemaker Father      Thyroid Disease Daughter      Diabetes No family hx of      Hypertension No family hx of      Hyperlipidemia No family hx of      Cerebrovascular Disease No family hx of      Colon Cancer No family hx of      Prostate Cancer No family hx of      Genetic Disorder No family hx of      Asthma No family hx of      Anesthesia Reaction No family hx of        Social History     Socioeconomic History     Marital status:      Spouse name: Not on file     Number of children: Not on file     Years of education: Not on file     Highest education level: Not on file   Occupational History     Occupation: Financial     Comment:  - (FT)   Social Needs     Financial resource strain: Not on file     Food insecurity     Worry: Not on file     Inability: Not on file     Transportation needs     Medical: Not on file     Non-medical: Not on file   Tobacco Use     Smoking status: Never Smoker     Smokeless tobacco: Never Used     Tobacco comment: Tried to Quit (YES); QUIT in 1971; Passive Exposure (NO)   Substance and Sexual Activity     Alcohol use: Yes     " Comment: RARELY     Drug use: No     Sexual activity: Yes     Partners: Male     Birth control/protection: None   Lifestyle     Physical activity     Days per week: Not on file     Minutes per session: Not on file     Stress: Not on file   Relationships     Social connections     Talks on phone: Not on file     Gets together: Not on file     Attends Christianity service: Not on file     Active member of club or organization: Not on file     Attends meetings of clubs or organizations: Not on file     Relationship status: Not on file     Intimate partner violence     Fear of current or ex partner: Not on file     Emotionally abused: Not on file     Physically abused: Not on file     Forced sexual activity: Not on file   Other Topics Concern      Service Not Asked     Blood Transfusions Not Asked     Caffeine Concern Yes     Comment: Coffee >6 cups daily     Occupational Exposure Not Asked     Hobby Hazards Not Asked     Sleep Concern Not Asked     Stress Concern Not Asked     Weight Concern Not Asked     Special Diet Not Asked     Back Care Not Asked     Exercise Not Asked     Bike Helmet Not Asked     Seat Belt Not Asked     Self-Exams Not Asked     Parent/sibling w/ CABG, MI or angioplasty before 65F 55M? No   Social History Narrative     Not on file       Current Outpatient Medications   Medication     acyclovir (ZOVIRAX) 400 MG tablet     aspirin (ASA) 81 MG chewable tablet     atorvastatin (LIPITOR) 10 MG tablet     Bortezomib (VELCADE IJ)     cyclophosphamide 50 MG PO capsule     dexamethasone (DECADRON) 4 MG tablet     fluticasone (FLONASE) 50 MCG/ACT nasal spray     losartan (COZAAR) 50 MG tablet     sertraline (ZOLOFT) 50 MG tablet     EPINEPHrine (EPIPEN) 0.3 MG/0.3ML injection     LORazepam (ATIVAN) 0.5 MG tablet     ondansetron (ZOFRAN) 8 MG tablet     prochlorperazine (COMPAZINE) 10 MG tablet     No current facility-administered medications for this visit.         Allergies   Allergen Reactions      "Lisinopril Cough     Phenylephrine Hcl Other (See Comments)     **Entex  HEADACHE (SEVERE)     Phenylpropanolamine Other (See Comments)     **Entex  HEADACHE (SEVERE)     Pseudoephedrine Tannate Other (See Comments)     **Entex  HEADACHE (SEVERE)     Levofloxacin Rash     **Levaquin     Moxifloxacin Hcl [Avelox] Rash       Review Of Systems:  Constitutional:    denies fever, chills, and night sweats; has gained some weight.  Eyes:    denies blurred or double vision  Ears/Nose/Throat:   denies ear pain, nose problems, difficulty swallowing  Respiratory:   denies shortness of breath, cough   Skin:   denies rash, lesions  Cardiovascular:   denies chest pain, gets palpitations the day after chemo, edema  Gastrointestinal:   denies abdominal pain, bloating, nausea, early satiety; no change in bowel habits or blood in stool  Genitourinary:   denies difficulty with urination, blood in urine  Musculoskeletal:    denies new muscle pain, bone pain  Neurologic:   denies lightheadedness, headaches, numbness or tingling  Psychiatric:   See HPI  Hematologic/Lymphatic/Immunologic:   denies easy bruising, easy bleeding, lumps or bumps noted  Endocrine:   Denies increased thirst      ECOG Performance Status: 1    Physical Exam:  /70   Pulse 86   Temp 99  F (37.2  C) (Tympanic)   Resp 18   Ht 1.6 m (5' 3\")   Wt 76.2 kg (167 lb 15.9 oz)   SpO2 96%   BMI 29.76 kg/m      GENERAL APPEARANCE: Healthy, alert and in no acute distress.  HEENT: Normocephalic, Sclerae anicteric. Oropharynx without ulcers, lesions, or thrush.  NECK:   No asymmetry or masses, no thyromegaly.  LYMPHATICS: No palpable cervical, supraclavicular, axillary, or inguinal nodes   RESP: Lungs clear to auscultation bilaterally, respirations regular and easy  CARDIOVASCULAR: Regular rate and rhythm. Normal S1, S2; no murmur, gallop, or rub.  ABDOMEN: Soft, nontender. Bowel sounds auscultated all 4 quadrants. No palpable organomegaly or " masses.  MUSCULOSKELETAL: Extremities without gross deformities noted. No edema of bilateral lower extremities.  NEURO: Alert and oriented x 3.  Gait steady.  PSYCHIATRIC: Mentation and affect appear normal.  Mood appropriate.    Laboratory:    Results for orders placed or performed in visit on 09/03/20   Comprehensive metabolic panel     Status: Abnormal   Result Value Ref Range    Sodium 135 133 - 144 mmol/L    Potassium 3.6 3.4 - 5.3 mmol/L    Chloride 103 94 - 109 mmol/L    Carbon Dioxide 28 20 - 32 mmol/L    Anion Gap 4 3 - 14 mmol/L    Glucose 98 70 - 99 mg/dL    Urea Nitrogen 21 7 - 30 mg/dL    Creatinine 0.96 0.52 - 1.04 mg/dL    GFR Estimate 59 (L) >60 mL/min/[1.73_m2]    GFR Estimate If Black 69 >60 mL/min/[1.73_m2]    Calcium 8.7 8.5 - 10.1 mg/dL    Bilirubin Total 0.4 0.2 - 1.3 mg/dL    Albumin 3.3 (L) 3.4 - 5.0 g/dL    Protein Total 7.8 6.8 - 8.8 g/dL    Alkaline Phosphatase 99 40 - 150 U/L    ALT 19 0 - 50 U/L    AST 15 0 - 45 U/L   CBC with platelets differential     Status: Abnormal   Result Value Ref Range    WBC 5.2 4.0 - 11.0 10e9/L    RBC Count 3.56 (L) 3.8 - 5.2 10e12/L    Hemoglobin 11.5 (L) 11.7 - 15.7 g/dL    Hematocrit 33.2 (L) 35.0 - 47.0 %    MCV 93 78 - 100 fl    MCH 32.3 26.5 - 33.0 pg    MCHC 34.6 31.5 - 36.5 g/dL    RDW 14.3 10.0 - 15.0 %    Platelet Count 195 150 - 450 10e9/L    Diff Method Automated Method     % Neutrophils 54.4 %    % Lymphocytes 23.3 %    % Monocytes 16.8 %    % Eosinophils 3.8 %    % Basophils 0.2 %    % Immature Granulocytes 1.5 %    Nucleated RBCs 0 0 /100    Absolute Neutrophil 2.8 1.6 - 8.3 10e9/L    Absolute Lymphocytes 1.2 0.8 - 5.3 10e9/L    Absolute Monocytes 0.9 0.0 - 1.3 10e9/L    Absolute Eosinophils 0.2 0.0 - 0.7 10e9/L    Absolute Basophils 0.0 0.0 - 0.2 10e9/L    Abs Immature Granulocytes 0.1 0 - 0.4 10e9/L    Absolute Nucleated RBC 0.0        Component      Latest Ref Rng & Units 8/27/2020   Albumin Fraction      3.7 - 5.1 g/dL 4.2   Alpha 1  Fraction      0.2 - 0.4 g/dL 0.3   Alpha 2 Fraction      0.5 - 0.9 g/dL 0.8   Beta Fraction      0.6 - 1.0 g/dL 0.6   Gamma Fraction      0.7 - 1.6 g/dL 2.1 (H)   Monoclonal Peak      0.0 g/dL 1.9 (H)   ELP Interpretation:       Fairly large monoclonal protein (about 1.9 g/dL) seen in the gamma fraction.  Previously . . .   IGG      610 - 1,616 mg/dL 2,277 (H)   IGA      84 - 499 mg/dL 10 (L)   IGM      35 - 242 mg/dL 11 (L)   [place] Free Lt Chain      0.33 - 1.94 mg/dL 0.70   Lambda Free Lt Chain      0.57 - 2.63 mg/dL 0.27 (L)   Kappa Lambda Ratio      0.26 - 1.65 2.59 (H)   Beta-2-Microglobulin      <2.3 mg/L 3.1 (H)   Viscosity Index      1.4 - 1.8 1.7     Imaging Studies:  None completed for today's visit       ASSESSMENT/PLAN:    #1 Multiple myeloma:   IgG kappa multiple myeloma with 80% involvement of the bone marrow diagnosed June 2019 initially treated with Velcade and Decadron and received 4 cycles with increasing M-spike and kappa/lambda ratio.  Treatment changed to CyBorD on 10/1/2019 with elevating LFT's felt to be related to the Cytoxan so this was decreased with cycle 3 day 8.  M-spike up a bit to 1.9.   Will continue the same per recommendation of Dr. Walker and she will initiate cycle 13 today and follow-up prior to cycle 14-day 1 with myeloma labs.       #2  Bone lesions: Continue Zometa every 3 months.      I encouraged patient to call with any questions or concerns.       Barbie Quintana, NP  APRN, FNP-BC, AOCNP

## 2020-09-03 ENCOUNTER — INFUSION THERAPY VISIT (OUTPATIENT)
Dept: INFUSION THERAPY | Facility: OTHER | Age: 70
End: 2020-09-03
Attending: INTERNAL MEDICINE
Payer: MEDICARE

## 2020-09-03 ENCOUNTER — ONCOLOGY VISIT (OUTPATIENT)
Dept: ONCOLOGY | Facility: OTHER | Age: 70
End: 2020-09-03
Attending: NURSE PRACTITIONER
Payer: MEDICARE

## 2020-09-03 VITALS
BODY MASS INDEX: 29.77 KG/M2 | WEIGHT: 167.99 LBS | HEART RATE: 86 BPM | HEIGHT: 63 IN | SYSTOLIC BLOOD PRESSURE: 105 MMHG | TEMPERATURE: 99 F | OXYGEN SATURATION: 96 % | DIASTOLIC BLOOD PRESSURE: 70 MMHG | RESPIRATION RATE: 18 BRPM

## 2020-09-03 DIAGNOSIS — C90.00 MULTIPLE MYELOMA NOT HAVING ACHIEVED REMISSION (H): Primary | ICD-10-CM

## 2020-09-03 DIAGNOSIS — M89.9 BONE LESION: ICD-10-CM

## 2020-09-03 DIAGNOSIS — E78.2 MIXED HYPERLIPIDEMIA: ICD-10-CM

## 2020-09-03 LAB
ALBUMIN SERPL-MCNC: 3.3 G/DL (ref 3.4–5)
ALP SERPL-CCNC: 99 U/L (ref 40–150)
ALT SERPL W P-5'-P-CCNC: 19 U/L (ref 0–50)
ANION GAP SERPL CALCULATED.3IONS-SCNC: 4 MMOL/L (ref 3–14)
AST SERPL W P-5'-P-CCNC: 15 U/L (ref 0–45)
BASOPHILS # BLD AUTO: 0 10E9/L (ref 0–0.2)
BASOPHILS NFR BLD AUTO: 0.2 %
BILIRUB SERPL-MCNC: 0.4 MG/DL (ref 0.2–1.3)
BUN SERPL-MCNC: 21 MG/DL (ref 7–30)
CALCIUM SERPL-MCNC: 8.7 MG/DL (ref 8.5–10.1)
CHLORIDE SERPL-SCNC: 103 MMOL/L (ref 94–109)
CO2 SERPL-SCNC: 28 MMOL/L (ref 20–32)
CREAT SERPL-MCNC: 0.96 MG/DL (ref 0.52–1.04)
DIFFERENTIAL METHOD BLD: ABNORMAL
EOSINOPHIL # BLD AUTO: 0.2 10E9/L (ref 0–0.7)
EOSINOPHIL NFR BLD AUTO: 3.8 %
ERYTHROCYTE [DISTWIDTH] IN BLOOD BY AUTOMATED COUNT: 14.3 % (ref 10–15)
GFR SERPL CREATININE-BSD FRML MDRD: 59 ML/MIN/{1.73_M2}
GLUCOSE SERPL-MCNC: 98 MG/DL (ref 70–99)
HCT VFR BLD AUTO: 33.2 % (ref 35–47)
HGB BLD-MCNC: 11.5 G/DL (ref 11.7–15.7)
IMM GRANULOCYTES # BLD: 0.1 10E9/L (ref 0–0.4)
IMM GRANULOCYTES NFR BLD: 1.5 %
LYMPHOCYTES # BLD AUTO: 1.2 10E9/L (ref 0.8–5.3)
LYMPHOCYTES NFR BLD AUTO: 23.3 %
MCH RBC QN AUTO: 32.3 PG (ref 26.5–33)
MCHC RBC AUTO-ENTMCNC: 34.6 G/DL (ref 31.5–36.5)
MCV RBC AUTO: 93 FL (ref 78–100)
MONOCYTES # BLD AUTO: 0.9 10E9/L (ref 0–1.3)
MONOCYTES NFR BLD AUTO: 16.8 %
NEUTROPHILS # BLD AUTO: 2.8 10E9/L (ref 1.6–8.3)
NEUTROPHILS NFR BLD AUTO: 54.4 %
NRBC # BLD AUTO: 0 10*3/UL
NRBC BLD AUTO-RTO: 0 /100
PLATELET # BLD AUTO: 195 10E9/L (ref 150–450)
POTASSIUM SERPL-SCNC: 3.6 MMOL/L (ref 3.4–5.3)
PROT SERPL-MCNC: 7.8 G/DL (ref 6.8–8.8)
RBC # BLD AUTO: 3.56 10E12/L (ref 3.8–5.2)
SODIUM SERPL-SCNC: 135 MMOL/L (ref 133–144)
WBC # BLD AUTO: 5.2 10E9/L (ref 4–11)

## 2020-09-03 PROCEDURE — 80053 COMPREHEN METABOLIC PANEL: CPT | Mod: ZL | Performed by: NURSE PRACTITIONER

## 2020-09-03 PROCEDURE — 25000128 H RX IP 250 OP 636: Performed by: NURSE PRACTITIONER

## 2020-09-03 PROCEDURE — G0463 HOSPITAL OUTPT CLINIC VISIT: HCPCS

## 2020-09-03 PROCEDURE — G0463 HOSPITAL OUTPT CLINIC VISIT: HCPCS | Mod: 25

## 2020-09-03 PROCEDURE — 96401 CHEMO ANTI-NEOPL SQ/IM: CPT

## 2020-09-03 PROCEDURE — 36415 COLL VENOUS BLD VENIPUNCTURE: CPT | Mod: ZL | Performed by: NURSE PRACTITIONER

## 2020-09-03 PROCEDURE — 85025 COMPLETE CBC W/AUTO DIFF WBC: CPT | Mod: ZL | Performed by: NURSE PRACTITIONER

## 2020-09-03 PROCEDURE — 99213 OFFICE O/P EST LOW 20 MIN: CPT | Mod: CR | Performed by: NURSE PRACTITIONER

## 2020-09-03 RX ORDER — ONDANSETRON 4 MG/1
8 TABLET, FILM COATED ORAL ONCE
Status: COMPLETED | OUTPATIENT
Start: 2020-09-03 | End: 2020-09-03

## 2020-09-03 RX ADMIN — ONDANSETRON HYDROCHLORIDE 8 MG: 4 TABLET, FILM COATED ORAL at 14:59

## 2020-09-03 RX ADMIN — BORTEZOMIB 2.7 MG: 3.5 INJECTION, POWDER, LYOPHILIZED, FOR SOLUTION INTRAVENOUS; SUBCUTANEOUS at 15:32

## 2020-09-03 ASSESSMENT — PAIN SCALES - GENERAL: PAINLEVEL: NO PAIN (0)

## 2020-09-03 ASSESSMENT — MIFFLIN-ST. JEOR: SCORE: 1246.13

## 2020-09-03 NOTE — NURSING NOTE
"Chief Complaint   Patient presents with     RECHECK     Follow up Multiple myeloma        Initial /70   Pulse 86   Temp 99  F (37.2  C) (Tympanic)   Resp 18   Ht 1.6 m (5' 3\")   Wt 76.2 kg (167 lb 15.9 oz)   SpO2 96%   BMI 29.76 kg/m   Estimated body mass index is 29.76 kg/m  as calculated from the following:    Height as of this encounter: 1.6 m (5' 3\").    Weight as of this encounter: 76.2 kg (167 lb 15.9 oz).  Medication Reconciliation: complete.  Immunizations and advance directives status reviewed. Pain scale =0 , PHQ-2=2.            Vilma Chi LPN    "

## 2020-09-03 NOTE — PROGRESS NOTES
Velcade injected SQ into right upper outer arm at 45 degree angle  per protocol rotating sites.      Injection administered per protocol. Patient tolerated infusion without incident, no signs or symptoms of adverse reaction noted. Patient denies pain or discomfort.      Covered with a sterile bandage. Pt instructed to leave bandage intact for a minimum of one hour, and to call with questions or concerns. Copy of appointments, discharge instructions, and after visit summary (AVS) provided to patient. Patient states understanding, discharged ambulatory.

## 2020-09-03 NOTE — PROGRESS NOTES
Patient is a 69 year old female here today for injection of Velcade per order of . Patient meets parameters for today's infusion. Patient identified with two identifiers, order verified, and verbal consent for today's infusion obtained from patient. Written consent for treatment is on file and valid.    Today's lab values: WBC: 5.2, HGB: 11.5, PLT: 195  ANC: 2.8. Patient meets order parameters for today's treatment.    Independent dose check with Yancy Quintanilla RN prior to release of drug.    Patient denies questions or concerns regarding injection and/or medication(s) being administered.

## 2020-09-03 NOTE — PATIENT INSTRUCTIONS
We will see you back as planned. If you have any questions or concerns, we can be reached Monday through Friday 8am - 430pm at [phone] [place] [place] If you have concerns related to a potential reaction/side effect after hours/weekends/holiday's, please seek emergent medical care.

## 2020-09-03 NOTE — PATIENT INSTRUCTIONS
We would like to see you back per your schedule.    If you have any questions please call [phone]    Other instructions:  none

## 2020-09-10 ENCOUNTER — INFUSION THERAPY VISIT (OUTPATIENT)
Dept: INFUSION THERAPY | Facility: OTHER | Age: 70
End: 2020-09-10
Attending: INTERNAL MEDICINE
Payer: MEDICARE

## 2020-09-10 VITALS
DIASTOLIC BLOOD PRESSURE: 90 MMHG | WEIGHT: 166.01 LBS | HEART RATE: 71 BPM | BODY MASS INDEX: 29.41 KG/M2 | SYSTOLIC BLOOD PRESSURE: 132 MMHG | TEMPERATURE: 97.9 F | OXYGEN SATURATION: 96 %

## 2020-09-10 DIAGNOSIS — C90.00 MULTIPLE MYELOMA NOT HAVING ACHIEVED REMISSION (H): Primary | ICD-10-CM

## 2020-09-10 LAB
BASOPHILS # BLD AUTO: 0 10E9/L (ref 0–0.2)
BASOPHILS NFR BLD AUTO: 0.4 %
DIFFERENTIAL METHOD BLD: NORMAL
EOSINOPHIL # BLD AUTO: 0.1 10E9/L (ref 0–0.7)
EOSINOPHIL NFR BLD AUTO: 2.4 %
ERYTHROCYTE [DISTWIDTH] IN BLOOD BY AUTOMATED COUNT: 14.6 % (ref 10–15)
HCT VFR BLD AUTO: 36.9 % (ref 35–47)
HGB BLD-MCNC: 12.6 G/DL (ref 11.7–15.7)
IMM GRANULOCYTES # BLD: 0.1 10E9/L (ref 0–0.4)
IMM GRANULOCYTES NFR BLD: 1.6 %
LYMPHOCYTES # BLD AUTO: 1.3 10E9/L (ref 0.8–5.3)
LYMPHOCYTES NFR BLD AUTO: 24 %
MCH RBC QN AUTO: 32.1 PG (ref 26.5–33)
MCHC RBC AUTO-ENTMCNC: 34.1 G/DL (ref 31.5–36.5)
MCV RBC AUTO: 94 FL (ref 78–100)
MONOCYTES # BLD AUTO: 0.9 10E9/L (ref 0–1.3)
MONOCYTES NFR BLD AUTO: 15.6 %
NEUTROPHILS # BLD AUTO: 3.1 10E9/L (ref 1.6–8.3)
NEUTROPHILS NFR BLD AUTO: 56 %
NRBC # BLD AUTO: 0 10*3/UL
NRBC BLD AUTO-RTO: 0 /100
PLATELET # BLD AUTO: 201 10E9/L (ref 150–450)
RBC # BLD AUTO: 3.93 10E12/L (ref 3.8–5.2)
WBC # BLD AUTO: 5.5 10E9/L (ref 4–11)

## 2020-09-10 PROCEDURE — 36415 COLL VENOUS BLD VENIPUNCTURE: CPT | Mod: ZL | Performed by: NURSE PRACTITIONER

## 2020-09-10 PROCEDURE — 96401 CHEMO ANTI-NEOPL SQ/IM: CPT

## 2020-09-10 PROCEDURE — 25000128 H RX IP 250 OP 636: Mod: JW | Performed by: NURSE PRACTITIONER

## 2020-09-10 PROCEDURE — 85025 COMPLETE CBC W/AUTO DIFF WBC: CPT | Mod: ZL | Performed by: NURSE PRACTITIONER

## 2020-09-10 RX ORDER — ONDANSETRON 4 MG/1
8 TABLET, FILM COATED ORAL ONCE
Status: COMPLETED | OUTPATIENT
Start: 2020-09-10 | End: 2020-09-10

## 2020-09-10 RX ADMIN — BORTEZOMIB 2.7 MG: 3.5 INJECTION, POWDER, LYOPHILIZED, FOR SOLUTION INTRAVENOUS; SUBCUTANEOUS at 12:40

## 2020-09-10 RX ADMIN — ONDANSETRON HYDROCHLORIDE 8 MG: 4 TABLET, FILM COATED ORAL at 12:35

## 2020-09-10 ASSESSMENT — PAIN SCALES - GENERAL: PAINLEVEL: NO PAIN (0)

## 2020-09-10 NOTE — PATIENT INSTRUCTIONS
We will see you back as planned. If you have any questions or concerns, we can be reached Monday through Friday 8am - 430pm at [phone] [place] [place] If you have concerns related to a potential reaction/side effect after hours/weekends/holiday's, please seek emergent medical care.

## 2020-09-10 NOTE — PROGRESS NOTES
Patient is a 69 year old female her today for injection of Velcade per order of . Patient identified with two identifiers, order verified, and verbal consent for today's infusion obtained from patient. Written consent for treatment is on file and valid.    Recent lab values: WBC: 5.5, HGB: 12.6, PLT: 201  ANC: 3.1.   Patient meets order parameters for today's treatment.    Velcade dose verified with UZMA Haines, prior to release of drug.      Patient denies questions or concerns regarding injection and/or medication(s) being administered.    Velcade injected SQ into left otter back arm  at 45 degree angle per protocol rotating sites. Patient tolerated injection without incident, no signs or symptoms of adverse reaction noted. Patient denies pain or discomfort.     Covered with a sterile bandage. Pt instructed to leave bandage intact for a minimum of one hour, and to call with questions or concerns. Copy of appointments, discharge instructions, and after visit summary (AVS) provided to patient. Patient states understanding, discharged.

## 2020-09-17 ENCOUNTER — INFUSION THERAPY VISIT (OUTPATIENT)
Dept: INFUSION THERAPY | Facility: OTHER | Age: 70
End: 2020-09-17
Attending: INTERNAL MEDICINE
Payer: MEDICARE

## 2020-09-17 VITALS
HEIGHT: 63 IN | HEART RATE: 76 BPM | BODY MASS INDEX: 29.3 KG/M2 | DIASTOLIC BLOOD PRESSURE: 74 MMHG | RESPIRATION RATE: 15 BRPM | SYSTOLIC BLOOD PRESSURE: 138 MMHG | WEIGHT: 165.34 LBS | TEMPERATURE: 97.7 F | OXYGEN SATURATION: 98 %

## 2020-09-17 DIAGNOSIS — C90.00 MULTIPLE MYELOMA NOT HAVING ACHIEVED REMISSION (H): Primary | ICD-10-CM

## 2020-09-17 LAB
BASOPHILS # BLD AUTO: 0 10E9/L (ref 0–0.2)
BASOPHILS NFR BLD AUTO: 0.4 %
DIFFERENTIAL METHOD BLD: ABNORMAL
EOSINOPHIL # BLD AUTO: 0.2 10E9/L (ref 0–0.7)
EOSINOPHIL NFR BLD AUTO: 3.6 %
ERYTHROCYTE [DISTWIDTH] IN BLOOD BY AUTOMATED COUNT: 14.5 % (ref 10–15)
HCT VFR BLD AUTO: 34.6 % (ref 35–47)
HGB BLD-MCNC: 12 G/DL (ref 11.7–15.7)
IMM GRANULOCYTES # BLD: 0.1 10E9/L (ref 0–0.4)
IMM GRANULOCYTES NFR BLD: 1.4 %
LYMPHOCYTES # BLD AUTO: 1.2 10E9/L (ref 0.8–5.3)
LYMPHOCYTES NFR BLD AUTO: 23.8 %
MCH RBC QN AUTO: 32.3 PG (ref 26.5–33)
MCHC RBC AUTO-ENTMCNC: 34.7 G/DL (ref 31.5–36.5)
MCV RBC AUTO: 93 FL (ref 78–100)
MONOCYTES # BLD AUTO: 0.8 10E9/L (ref 0–1.3)
MONOCYTES NFR BLD AUTO: 16.4 %
NEUTROPHILS # BLD AUTO: 2.7 10E9/L (ref 1.6–8.3)
NEUTROPHILS NFR BLD AUTO: 54.4 %
NRBC # BLD AUTO: 0 10*3/UL
NRBC BLD AUTO-RTO: 0 /100
PLATELET # BLD AUTO: 193 10E9/L (ref 150–450)
RBC # BLD AUTO: 3.72 10E12/L (ref 3.8–5.2)
WBC # BLD AUTO: 5 10E9/L (ref 4–11)

## 2020-09-17 PROCEDURE — 96401 CHEMO ANTI-NEOPL SQ/IM: CPT

## 2020-09-17 PROCEDURE — 36415 COLL VENOUS BLD VENIPUNCTURE: CPT | Mod: ZL | Performed by: NURSE PRACTITIONER

## 2020-09-17 PROCEDURE — 85025 COMPLETE CBC W/AUTO DIFF WBC: CPT | Mod: ZL | Performed by: NURSE PRACTITIONER

## 2020-09-17 PROCEDURE — 25000128 H RX IP 250 OP 636: Performed by: NURSE PRACTITIONER

## 2020-09-17 RX ORDER — ONDANSETRON 4 MG/1
8 TABLET, FILM COATED ORAL ONCE
Status: COMPLETED | OUTPATIENT
Start: 2020-09-17 | End: 2020-09-17

## 2020-09-17 RX ADMIN — BORTEZOMIB 2.7 MG: 3.5 INJECTION, POWDER, LYOPHILIZED, FOR SOLUTION INTRAVENOUS; SUBCUTANEOUS at 12:07

## 2020-09-17 RX ADMIN — ONDANSETRON HYDROCHLORIDE 8 MG: 4 TABLET, FILM COATED ORAL at 12:05

## 2020-09-17 ASSESSMENT — PAIN SCALES - GENERAL: PAINLEVEL: NO PAIN (0)

## 2020-09-17 ASSESSMENT — MIFFLIN-ST. JEOR: SCORE: 1234.13

## 2020-09-17 NOTE — PROGRESS NOTES
Patient is a 69 year old female here today for injection of Velcade per order of . Patient meets parameters for today's infusion. Patient identified with two identifiers, order verified, and verbal consent for today's infusion obtained from patient. Written consent for treatment is on file and valid.    Recent lab values: WBC: 5.0, HGB: 12.0, PLT: 193  ANC: 2.7. Patient meets order parameters for today's treatment.  Patient denies questions or concerns regarding injection and/or medication(s) being administered.  Velcade injected SQ into right upper outer arm  per protocol rotating sites.     Injection administered per protocol. Patient tolerated infusion without incident, no signs or symptoms of adverse reaction noted. Patient denies pain or discomfort.     Covered with a sterile bandage. Pt instructed to leave bandage intact for a minimum of one hour, and to call with questions or concerns. Copy of appointments, discharge instructions, and after visit summary (AVS) provided to patient. Patient states understanding, discharged ambulatory.

## 2020-09-24 ENCOUNTER — INFUSION THERAPY VISIT (OUTPATIENT)
Dept: INFUSION THERAPY | Facility: OTHER | Age: 70
End: 2020-09-24
Attending: INTERNAL MEDICINE
Payer: MEDICARE

## 2020-09-24 VITALS
BODY MASS INDEX: 29.45 KG/M2 | DIASTOLIC BLOOD PRESSURE: 80 MMHG | HEART RATE: 77 BPM | WEIGHT: 166.23 LBS | TEMPERATURE: 98.3 F | HEIGHT: 63 IN | RESPIRATION RATE: 17 BRPM | OXYGEN SATURATION: 96 % | SYSTOLIC BLOOD PRESSURE: 124 MMHG

## 2020-09-24 DIAGNOSIS — C90.00 MULTIPLE MYELOMA NOT HAVING ACHIEVED REMISSION (H): Primary | ICD-10-CM

## 2020-09-24 LAB
BASOPHILS # BLD AUTO: 0 10E9/L (ref 0–0.2)
BASOPHILS NFR BLD AUTO: 0.5 %
DIFFERENTIAL METHOD BLD: NORMAL
EOSINOPHIL # BLD AUTO: 0.1 10E9/L (ref 0–0.7)
EOSINOPHIL NFR BLD AUTO: 2.5 %
ERYTHROCYTE [DISTWIDTH] IN BLOOD BY AUTOMATED COUNT: 14.6 % (ref 10–15)
HCT VFR BLD AUTO: 35.8 % (ref 35–47)
HGB BLD-MCNC: 12.2 G/DL (ref 11.7–15.7)
IMM GRANULOCYTES # BLD: 0.1 10E9/L (ref 0–0.4)
IMM GRANULOCYTES NFR BLD: 1.4 %
LYMPHOCYTES # BLD AUTO: 1.1 10E9/L (ref 0.8–5.3)
LYMPHOCYTES NFR BLD AUTO: 23.9 %
MCH RBC QN AUTO: 31.7 PG (ref 26.5–33)
MCHC RBC AUTO-ENTMCNC: 34.1 G/DL (ref 31.5–36.5)
MCV RBC AUTO: 93 FL (ref 78–100)
MONOCYTES # BLD AUTO: 0.7 10E9/L (ref 0–1.3)
MONOCYTES NFR BLD AUTO: 15.2 %
NEUTROPHILS # BLD AUTO: 2.5 10E9/L (ref 1.6–8.3)
NEUTROPHILS NFR BLD AUTO: 56.5 %
NRBC # BLD AUTO: 0 10*3/UL
NRBC BLD AUTO-RTO: 0 /100
PLATELET # BLD AUTO: 218 10E9/L (ref 150–450)
RBC # BLD AUTO: 3.85 10E12/L (ref 3.8–5.2)
WBC # BLD AUTO: 4.4 10E9/L (ref 4–11)

## 2020-09-24 PROCEDURE — 96401 CHEMO ANTI-NEOPL SQ/IM: CPT

## 2020-09-24 PROCEDURE — 85810 BLOOD VISCOSITY EXAMINATION: CPT | Mod: ZL | Performed by: NURSE PRACTITIONER

## 2020-09-24 PROCEDURE — 82784 ASSAY IGA/IGD/IGG/IGM EACH: CPT | Mod: ZL | Performed by: NURSE PRACTITIONER

## 2020-09-24 PROCEDURE — 36415 COLL VENOUS BLD VENIPUNCTURE: CPT | Mod: ZL | Performed by: NURSE PRACTITIONER

## 2020-09-24 PROCEDURE — 85025 COMPLETE CBC W/AUTO DIFF WBC: CPT | Mod: ZL | Performed by: NURSE PRACTITIONER

## 2020-09-24 PROCEDURE — 82232 ASSAY OF BETA-2 PROTEIN: CPT | Mod: ZL | Performed by: NURSE PRACTITIONER

## 2020-09-24 PROCEDURE — 83883 ASSAY NEPHELOMETRY NOT SPEC: CPT | Mod: ZL | Performed by: NURSE PRACTITIONER

## 2020-09-24 PROCEDURE — 00000402 ZZHCL STATISTIC TOTAL PROTEIN: Mod: ZL | Performed by: NURSE PRACTITIONER

## 2020-09-24 PROCEDURE — 25000128 H RX IP 250 OP 636: Mod: JW

## 2020-09-24 PROCEDURE — 84165 PROTEIN E-PHORESIS SERUM: CPT | Mod: ZL | Performed by: NURSE PRACTITIONER

## 2020-09-24 PROCEDURE — 25000128 H RX IP 250 OP 636: Performed by: NURSE PRACTITIONER

## 2020-09-24 RX ORDER — ONDANSETRON 4 MG/1
8 TABLET, FILM COATED ORAL ONCE
Status: COMPLETED | OUTPATIENT
Start: 2020-09-24 | End: 2020-09-24

## 2020-09-24 RX ADMIN — ONDANSETRON HYDROCHLORIDE 8 MG: 4 TABLET, FILM COATED ORAL at 12:07

## 2020-09-24 RX ADMIN — BORTEZOMIB 2.7 MG: 3.5 INJECTION, POWDER, LYOPHILIZED, FOR SOLUTION INTRAVENOUS; SUBCUTANEOUS at 12:09

## 2020-09-24 ASSESSMENT — MIFFLIN-ST. JEOR: SCORE: 1238

## 2020-09-24 NOTE — PATIENT INSTRUCTIONS
We will see you back as planned. If you have any questions or concerns, we can be reached Monday through Friday 8am - 430pm at [phone] ([facility] [place] If you have concerns related to a potential reaction/side effect after hours/weekends/holiday's, please seek emergent medical care.

## 2020-09-24 NOTE — PATIENT INSTRUCTIONS
Systane drops as needed for eye dryness.   Right ear- tube removed. No fluid or infection  Left ear-Tube is open and tube is starting extrude.   Recheck ears in 3 months.           Thank you for allowing Bridgette Ly and our ENT team to participate in your care.  If your medications are too expensive, please give the nurse a call.  We can possibly change this medication.  If you have a scheduling or an appointment question please contact our Health Unit Coordinator at their direct line [phone].   ALL nursing questions or concerns can be directed to your ENT nurse at: [phone]

## 2020-09-24 NOTE — PROGRESS NOTES
Patient is a 69 year old female  her today for injection of Velcade per order of . Patient identified with two identifiers, order verified, and verbal consent for today's infusion obtained from patient. Written consent for treatment is on file and valid.    Recent lab values: WBC: 4.4, HGB: 12.2, PLT: 218  ANC: 2.5.   Patient meets order parameters for today's treatment.    Velcade dose verified with Yancy KHANNA RN, prior to release of drug.      Patient denies questions or concerns regarding injection and/or medication(s) being administered.    Velcade injected SQ into left upper arm at 45 degree angle per protocol rotating sites. Patient tolerated injection without incident, no signs or symptoms of adverse reaction noted. Patient denies pain or discomfort.     Covered with a sterile bandage. Pt instructed to leave bandage intact for a minimum of one hour, and to call with questions or concerns. Copy of appointments, discharge instructions, and after visit summary (AVS) provided to patient. Patient states understanding, discharged.

## 2020-09-25 LAB
ALBUMIN SERPL ELPH-MCNC: 4.3 G/DL (ref 3.7–5.1)
ALPHA1 GLOB SERPL ELPH-MCNC: 0.3 G/DL (ref 0.2–0.4)
ALPHA2 GLOB SERPL ELPH-MCNC: 0.7 G/DL (ref 0.5–0.9)
B-GLOBULIN SERPL ELPH-MCNC: 0.6 G/DL (ref 0.6–1)
B2 MICROGLOB SERPL-MCNC: 2.2 MG/L
GAMMA GLOB SERPL ELPH-MCNC: 1.9 G/DL (ref 0.7–1.6)
IGA SERPL-MCNC: 11 MG/DL (ref 84–499)
IGG SERPL-MCNC: 2134 MG/DL (ref 610–1616)
IGM SERPL-MCNC: 11 MG/DL (ref 35–242)
KAPPA LC UR-MCNC: 0.68 MG/DL (ref 0.33–1.94)
KAPPA LC/LAMBDA SER: 2.72 {RATIO} (ref 0.26–1.65)
LAMBDA LC SERPL-MCNC: 0.25 MG/DL (ref 0.57–2.63)
M PROTEIN SERPL ELPH-MCNC: 1.2 G/DL
PROT PATTERN SERPL ELPH-IMP: ABNORMAL
VISC SER: 1.7 CP (ref 1.4–1.8)

## 2020-09-28 ENCOUNTER — OFFICE VISIT (OUTPATIENT)
Dept: OTOLARYNGOLOGY | Facility: OTHER | Age: 70
End: 2020-09-28
Attending: PHYSICIAN ASSISTANT
Payer: COMMERCIAL

## 2020-09-28 VITALS
SYSTOLIC BLOOD PRESSURE: 102 MMHG | HEART RATE: 77 BPM | OXYGEN SATURATION: 97 % | DIASTOLIC BLOOD PRESSURE: 72 MMHG | TEMPERATURE: 96.7 F

## 2020-09-28 DIAGNOSIS — T85.618D NON-FUNCTIONING TYMPANOSTOMY TUBE, SUBSEQUENT ENCOUNTER: Primary | ICD-10-CM

## 2020-09-28 DIAGNOSIS — C90.00 MULTIPLE MYELOMA NOT HAVING ACHIEVED REMISSION (H): ICD-10-CM

## 2020-09-28 DIAGNOSIS — Z92.21 STATUS POST CHEMOTHERAPY: ICD-10-CM

## 2020-09-28 DIAGNOSIS — H90.6 MIXED HEARING LOSS, BILATERAL: ICD-10-CM

## 2020-09-28 DIAGNOSIS — H69.93 ETD (EUSTACHIAN TUBE DYSFUNCTION), BILATERAL: ICD-10-CM

## 2020-09-28 PROCEDURE — 99213 OFFICE O/P EST LOW 20 MIN: CPT | Performed by: PHYSICIAN ASSISTANT

## 2020-09-28 PROCEDURE — G0463 HOSPITAL OUTPT CLINIC VISIT: HCPCS

## 2020-09-28 RX ORDER — ALBUTEROL SULFATE 0.83 MG/ML
2.5 SOLUTION RESPIRATORY (INHALATION)
Status: CANCELLED | OUTPATIENT
Start: 2020-09-30

## 2020-09-28 RX ORDER — METHYLPREDNISOLONE SODIUM SUCCINATE 125 MG/2ML
125 INJECTION, POWDER, LYOPHILIZED, FOR SOLUTION INTRAMUSCULAR; INTRAVENOUS
Status: CANCELLED
Start: 2020-10-07

## 2020-09-28 RX ORDER — ALBUTEROL SULFATE 90 UG/1
1-2 AEROSOL, METERED RESPIRATORY (INHALATION)
Status: CANCELLED
Start: 2020-10-21

## 2020-09-28 RX ORDER — NALOXONE HYDROCHLORIDE 0.4 MG/ML
.1-.4 INJECTION, SOLUTION INTRAMUSCULAR; INTRAVENOUS; SUBCUTANEOUS
Status: CANCELLED | OUTPATIENT
Start: 2020-10-14

## 2020-09-28 RX ORDER — METHYLPREDNISOLONE SODIUM SUCCINATE 125 MG/2ML
125 INJECTION, POWDER, LYOPHILIZED, FOR SOLUTION INTRAMUSCULAR; INTRAVENOUS
Status: CANCELLED
Start: 2020-09-30

## 2020-09-28 RX ORDER — ALBUTEROL SULFATE 0.83 MG/ML
2.5 SOLUTION RESPIRATORY (INHALATION)
Status: CANCELLED | OUTPATIENT
Start: 2020-10-21

## 2020-09-28 RX ORDER — DIPHENHYDRAMINE HYDROCHLORIDE 50 MG/ML
50 INJECTION INTRAMUSCULAR; INTRAVENOUS
Status: CANCELLED
Start: 2020-09-30

## 2020-09-28 RX ORDER — ONDANSETRON 4 MG/1
8 TABLET, FILM COATED ORAL ONCE
Status: CANCELLED
Start: 2020-10-21

## 2020-09-28 RX ORDER — SODIUM CHLORIDE 9 MG/ML
1000 INJECTION, SOLUTION INTRAVENOUS CONTINUOUS PRN
Status: CANCELLED
Start: 2020-10-07

## 2020-09-28 RX ORDER — ALBUTEROL SULFATE 90 UG/1
1-2 AEROSOL, METERED RESPIRATORY (INHALATION)
Status: CANCELLED
Start: 2020-10-07

## 2020-09-28 RX ORDER — LORAZEPAM 2 MG/ML
0.5 INJECTION INTRAMUSCULAR EVERY 4 HOURS PRN
Status: CANCELLED
Start: 2020-09-30

## 2020-09-28 RX ORDER — ALBUTEROL SULFATE 90 UG/1
1-2 AEROSOL, METERED RESPIRATORY (INHALATION)
Status: CANCELLED
Start: 2020-10-14

## 2020-09-28 RX ORDER — SODIUM CHLORIDE 9 MG/ML
1000 INJECTION, SOLUTION INTRAVENOUS CONTINUOUS PRN
Status: CANCELLED
Start: 2020-10-21

## 2020-09-28 RX ORDER — EPINEPHRINE 1 MG/ML
0.3 INJECTION, SOLUTION, CONCENTRATE INTRAVENOUS EVERY 5 MIN PRN
Status: CANCELLED | OUTPATIENT
Start: 2020-10-21

## 2020-09-28 RX ORDER — SODIUM CHLORIDE 9 MG/ML
1000 INJECTION, SOLUTION INTRAVENOUS CONTINUOUS PRN
Status: CANCELLED
Start: 2020-10-14

## 2020-09-28 RX ORDER — MEPERIDINE HYDROCHLORIDE 25 MG/ML
25 INJECTION INTRAMUSCULAR; INTRAVENOUS; SUBCUTANEOUS EVERY 30 MIN PRN
Status: CANCELLED | OUTPATIENT
Start: 2020-10-14

## 2020-09-28 RX ORDER — ALBUTEROL SULFATE 90 UG/1
1-2 AEROSOL, METERED RESPIRATORY (INHALATION)
Status: CANCELLED
Start: 2020-09-30

## 2020-09-28 RX ORDER — ALBUTEROL SULFATE 0.83 MG/ML
2.5 SOLUTION RESPIRATORY (INHALATION)
Status: CANCELLED | OUTPATIENT
Start: 2020-10-07

## 2020-09-28 RX ORDER — DIPHENHYDRAMINE HYDROCHLORIDE 50 MG/ML
50 INJECTION INTRAMUSCULAR; INTRAVENOUS
Status: CANCELLED
Start: 2020-10-14

## 2020-09-28 RX ORDER — MEPERIDINE HYDROCHLORIDE 25 MG/ML
25 INJECTION INTRAMUSCULAR; INTRAVENOUS; SUBCUTANEOUS EVERY 30 MIN PRN
Status: CANCELLED | OUTPATIENT
Start: 2020-10-07

## 2020-09-28 RX ORDER — NALOXONE HYDROCHLORIDE 0.4 MG/ML
.1-.4 INJECTION, SOLUTION INTRAMUSCULAR; INTRAVENOUS; SUBCUTANEOUS
Status: CANCELLED | OUTPATIENT
Start: 2020-10-07

## 2020-09-28 RX ORDER — DIPHENHYDRAMINE HYDROCHLORIDE 50 MG/ML
50 INJECTION INTRAMUSCULAR; INTRAVENOUS
Status: CANCELLED
Start: 2020-10-07

## 2020-09-28 RX ORDER — NALOXONE HYDROCHLORIDE 0.4 MG/ML
.1-.4 INJECTION, SOLUTION INTRAMUSCULAR; INTRAVENOUS; SUBCUTANEOUS
Status: CANCELLED | OUTPATIENT
Start: 2020-10-21

## 2020-09-28 RX ORDER — NALOXONE HYDROCHLORIDE 0.4 MG/ML
.1-.4 INJECTION, SOLUTION INTRAMUSCULAR; INTRAVENOUS; SUBCUTANEOUS
Status: CANCELLED | OUTPATIENT
Start: 2020-09-30

## 2020-09-28 RX ORDER — ONDANSETRON 4 MG/1
8 TABLET, FILM COATED ORAL ONCE
Status: CANCELLED | OUTPATIENT
Start: 2020-10-14

## 2020-09-28 RX ORDER — ONDANSETRON 4 MG/1
8 TABLET, FILM COATED ORAL ONCE
Status: CANCELLED | OUTPATIENT
Start: 2020-09-30

## 2020-09-28 RX ORDER — EPINEPHRINE 1 MG/ML
0.3 INJECTION, SOLUTION, CONCENTRATE INTRAVENOUS EVERY 5 MIN PRN
Status: CANCELLED | OUTPATIENT
Start: 2020-09-30

## 2020-09-28 RX ORDER — MEPERIDINE HYDROCHLORIDE 25 MG/ML
25 INJECTION INTRAMUSCULAR; INTRAVENOUS; SUBCUTANEOUS EVERY 30 MIN PRN
Status: CANCELLED | OUTPATIENT
Start: 2020-09-30

## 2020-09-28 RX ORDER — METHYLPREDNISOLONE SODIUM SUCCINATE 125 MG/2ML
125 INJECTION, POWDER, LYOPHILIZED, FOR SOLUTION INTRAMUSCULAR; INTRAVENOUS
Status: CANCELLED
Start: 2020-10-14

## 2020-09-28 RX ORDER — MEPERIDINE HYDROCHLORIDE 25 MG/ML
25 INJECTION INTRAMUSCULAR; INTRAVENOUS; SUBCUTANEOUS EVERY 30 MIN PRN
Status: CANCELLED | OUTPATIENT
Start: 2020-10-21

## 2020-09-28 RX ORDER — EPINEPHRINE 0.3 MG/.3ML
0.3 INJECTION SUBCUTANEOUS EVERY 5 MIN PRN
Status: CANCELLED | OUTPATIENT
Start: 2020-09-30

## 2020-09-28 RX ORDER — ONDANSETRON 4 MG/1
8 TABLET, FILM COATED ORAL ONCE
Status: CANCELLED | OUTPATIENT
Start: 2020-10-07

## 2020-09-28 RX ORDER — ALBUTEROL SULFATE 0.83 MG/ML
2.5 SOLUTION RESPIRATORY (INHALATION)
Status: CANCELLED | OUTPATIENT
Start: 2020-10-14

## 2020-09-28 RX ORDER — LORAZEPAM 2 MG/ML
0.5 INJECTION INTRAMUSCULAR EVERY 4 HOURS PRN
Status: CANCELLED
Start: 2020-10-21

## 2020-09-28 RX ORDER — SODIUM CHLORIDE 9 MG/ML
1000 INJECTION, SOLUTION INTRAVENOUS CONTINUOUS PRN
Status: CANCELLED
Start: 2020-09-30

## 2020-09-28 RX ORDER — EPINEPHRINE 0.3 MG/.3ML
0.3 INJECTION SUBCUTANEOUS EVERY 5 MIN PRN
Status: CANCELLED | OUTPATIENT
Start: 2020-10-07

## 2020-09-28 RX ORDER — EPINEPHRINE 0.3 MG/.3ML
0.3 INJECTION SUBCUTANEOUS EVERY 5 MIN PRN
Status: CANCELLED | OUTPATIENT
Start: 2020-10-21

## 2020-09-28 RX ORDER — EPINEPHRINE 0.3 MG/.3ML
0.3 INJECTION SUBCUTANEOUS EVERY 5 MIN PRN
Status: CANCELLED | OUTPATIENT
Start: 2020-10-14

## 2020-09-28 RX ORDER — DIPHENHYDRAMINE HYDROCHLORIDE 50 MG/ML
50 INJECTION INTRAMUSCULAR; INTRAVENOUS
Status: CANCELLED
Start: 2020-10-21

## 2020-09-28 RX ORDER — EPINEPHRINE 1 MG/ML
0.3 INJECTION, SOLUTION, CONCENTRATE INTRAVENOUS EVERY 5 MIN PRN
Status: CANCELLED | OUTPATIENT
Start: 2020-10-07

## 2020-09-28 RX ORDER — METHYLPREDNISOLONE SODIUM SUCCINATE 125 MG/2ML
125 INJECTION, POWDER, LYOPHILIZED, FOR SOLUTION INTRAMUSCULAR; INTRAVENOUS
Status: CANCELLED
Start: 2020-10-21

## 2020-09-28 RX ORDER — EPINEPHRINE 1 MG/ML
0.3 INJECTION, SOLUTION, CONCENTRATE INTRAVENOUS EVERY 5 MIN PRN
Status: CANCELLED | OUTPATIENT
Start: 2020-10-14

## 2020-09-28 RX ORDER — LORAZEPAM 2 MG/ML
0.5 INJECTION INTRAMUSCULAR EVERY 4 HOURS PRN
Status: CANCELLED
Start: 2020-10-14

## 2020-09-28 RX ORDER — LORAZEPAM 2 MG/ML
0.5 INJECTION INTRAMUSCULAR EVERY 4 HOURS PRN
Status: CANCELLED
Start: 2020-10-07

## 2020-09-28 ASSESSMENT — PAIN SCALES - GENERAL: PAINLEVEL: NO PAIN (0)

## 2020-09-28 NOTE — PROGRESS NOTES
Oncology Follow-up Visit:  September 30, 2020    Reason for Visit:  Patient presents with:  RECHECK: Follow up Multiple myeloma not having achieved remission      Nursing Note and documentation reviewed: yes    HPI: This is a 70-year-old female patient who presents to the oncology clinic today for evaluation prior to receiving cycle 14 chemotherapy for Stage II-RISS IgG multiple myeloma diagnosed June 2019.  She progressed on Velcade and dexamethasone and is now receiving CyBorD with decreased dose of cytoxan related to LFT elevation.        She presents to the clinic today stating that she is doing okay.  She continues on 25 mg of Zoloft and feels she is doing okay with this.  She continues to be busy.  She does have some mild fatigue and states she does nap during the day.  She has no new complaints.    Oncologic History:     12/31/2018   patient presented to the emergency room with vertigo and fatigue.  CT scan of the head was negative and subsequent stress test was negative.  5/3/2019  She was seen by her PCP who ordered lab work and noted a total protein of 12.9.  SPEP at that time showed an M spike of 6.2 with a large monoclonal protein seen in the gamma fraction.  Urine immunofixation showed a possible small protein band in the gamma fraction  5/31/2019  she was evaluated by Dr. Walker with Medical Oncology with plan to rule out myeloma and obtain bone marrow aspiration biopsy as well as a metastatic bone survey along with additional labwork  6/18/2019 she underwent bone marrow aspiration and biopsy  6/24/2019  She was seen again by Dr. Walker and CBC showed a hemoglobin of 9.3, M spike 7.3 with monoclonal IgG immunoglobulin of kappa light chain type; serum viscosity was 2.9; quantitative immunoglobulins showed an IgG of 8160, beta-2 microglobulin was 5.8, BUN was 21 with creatinine is 0.8 and total protein was 13.7.  Quantitative kappa/lambda free light chains showed an elevated ratio of 17.0 bone marrow  aspiration biopsy showed plasma cell myeloma with approximately 80% plasma cells.  Immunofixation showed IgG kappa and flow cytometry revealed kappa monotypic plasma cells consistent with clonal plasma cell neoplasm and FISH panel was pending at that time.  It was felt she had at least stage II disease based on her beta-2 microglobulin and anemia.  Plan was to treat with Velcade 1.3 mg per metered squared days 1, 4, 8 and 11/Decadron 40 mg on days 1, 8 and 15 initiation of Revlimid with the second cycle at 25 mg daily days 1 through 14.  Plan was to also obtain an MRI of the lumbar spine to rule out lytic lesion at L3.  She was initiated on Zovirax 400 mg p.o. twice daily.  6/25/2019  1st cycle of chemotherapy initiated  7/1/2019 note in chart regarding patient's large co-pay for the Revlimid and no plan at this point to initiate Revlimid and treat only with Velcade and Decadron per Dr. Walker  7/11/2019  MRI lumbar spine shows a pathologic superior endplate compression fracture at L3 without evidence of retropulsed fragment and innumerable enhancing lesions throughout the lumbar spine consistent with history of multiple myeloma.  9/10/2019  Increasing M-spike and kappa lambda ratio  10/1/2019  Initiation of CyBorD     Current Chemo Regime/TX:  Velcade 1.5mg.m2/cyclophosphamide 150mg every 7 days on days 1,8,15 and 22/decadron 40mg days 1,8,15,22       **Zometa 4mg every 3 months  Current Cycle: 14  # of completed cycles:  13     Previous treatment:   Velcade 1.3 mg/m2 days 1, 4, 8 and 11 with Decadron 40 mg days 1, 8 and 15 x 4 cycles    Past Medical History:   Diagnosis Date     Arthritis      Depressive disorder      Essential hypertension 10/1/2015     Major depressive disorder, recurrent episode, mild (H) 10/1/2015     Mixed hyperlipidemia 10/1/2015     Multiple myeloma not having achieved remission (H) 6/24/2019     Other specified disorders of bladder 07/09/2012    Irritable Bladder     Seasonal allergies  "10/1/2015     Unspecified essential hypertension 03/19/2007     Unspecified sinusitis (chronic) 09/05/2007       Past Surgical History:   Procedure Laterality Date     APPENDECTOMY       BONE MARROW BIOPSY, BONE SPECIMEN, NEEDLE/TROCAR N/A 6/18/2019    Procedure: BONE MARROW BIOPSY;  Surgeon: Maciej Sanz MD;  Location: [facility]     CHOLECYSTECTOMY       COLONOSCOPY  07-    repeat 10 years     COLONOSCOPY N/A 12/30/2016    Procedure: COLONOSCOPY;  Surgeon: Bhaskar Franklin DO;  Location: [facility]     SINUS SURGERY       TUBAL STERILIZATION         Family History   Problem Relation Age of Onset     Breast Cancer Mother 66        Cause of Death     Parkinsonism Father         \"Possible\"     Coronary Artery Disease Father      Pacemaker Father      Thyroid Disease Daughter      Diabetes No family hx of      Hypertension No family hx of      Hyperlipidemia No family hx of      Cerebrovascular Disease No family hx of      Colon Cancer No family hx of      Prostate Cancer No family hx of      Genetic Disorder No family hx of      Asthma No family hx of      Anesthesia Reaction No family hx of        Social History     Socioeconomic History     Marital status:      Spouse name: Not on file     Number of children: Not on file     Years of education: Not on file     Highest education level: Not on file   Occupational History     Occupation: Financial     Comment:  - (FT)   Social Needs     Financial resource strain: Not on file     Food insecurity     Worry: Not on file     Inability: Not on file     Transportation needs     Medical: Not on file     Non-medical: Not on file   Tobacco Use     Smoking status: Never Smoker     Smokeless tobacco: Never Used     Tobacco comment: Tried to Quit (YES); QUIT in 1971; Passive Exposure (NO)   Substance and Sexual Activity     Alcohol use: Yes     Comment: RARELY     Drug use: No     Sexual activity: Yes     Partners: Male     Birth control/protection: " None   Lifestyle     Physical activity     Days per week: Not on file     Minutes per session: Not on file     Stress: Not on file   Relationships     Social connections     Talks on phone: Not on file     Gets together: Not on file     Attends Shinto service: Not on file     Active member of club or organization: Not on file     Attends meetings of clubs or organizations: Not on file     Relationship status: Not on file     Intimate partner violence     Fear of current or ex partner: Not on file     Emotionally abused: Not on file     Physically abused: Not on file     Forced sexual activity: Not on file   Other Topics Concern      Service Not Asked     Blood Transfusions Not Asked     Caffeine Concern Yes     Comment: Coffee >6 cups daily     Occupational Exposure Not Asked     Hobby Hazards Not Asked     Sleep Concern Not Asked     Stress Concern Not Asked     Weight Concern Not Asked     Special Diet Not Asked     Back Care Not Asked     Exercise Not Asked     Bike Helmet Not Asked     Seat Belt Not Asked     Self-Exams Not Asked     Parent/sibling w/ CABG, MI or angioplasty before 65F 55M? No   Social History Narrative     Not on file       Current Outpatient Medications   Medication     acyclovir (ZOVIRAX) 400 MG tablet     aspirin (ASA) 81 MG chewable tablet     atorvastatin (LIPITOR) 10 MG tablet     Bortezomib (VELCADE IJ)     cyclophosphamide 50 MG PO capsule     dexamethasone (DECADRON) 4 MG tablet     fluticasone (FLONASE) 50 MCG/ACT nasal spray     losartan (COZAAR) 50 MG tablet     sertraline (ZOLOFT) 50 MG tablet     EPINEPHrine (EPIPEN) 0.3 MG/0.3ML injection     LORazepam (ATIVAN) 0.5 MG tablet     ondansetron (ZOFRAN) 8 MG tablet     prochlorperazine (COMPAZINE) 10 MG tablet     No current facility-administered medications for this visit.         Allergies   Allergen Reactions     Lisinopril Cough     Phenylephrine Hcl Other (See Comments)     **Entex  HEADACHE (SEVERE)      "Phenylpropanolamine Other (See Comments)     **Entex  HEADACHE (SEVERE)     Pseudoephedrine Tannate Other (See Comments)     **Entex  HEADACHE (SEVERE)     Levofloxacin Rash     **Levaquin     Moxifloxacin Hcl [Avelox] Rash       Review Of Systems:  Constitutional:    denies fever, weight changes, chills, and night sweats.  Eyes:    denies blurred or double vision  Ears/Nose/Throat:   denies ear pain, nose problems, difficulty swallowing  Respiratory:   denies shortness of breath, cough  Skin:   denies rash, lesions  Cardiovascular:   denies chest pain, palpitations, edema  Gastrointestinal:   denies abdominal pain, nausea, early satiety; no change in bowel habits or blood in stool  Genitourinary:   denies difficulty with urination, blood in urine  Musculoskeletal:    denies new muscle pain, bone pain  Neurologic:   denies lightheadedness, headaches, numbness or tingling  Psychiatric:  Feels she is \"holding her own\"  Hematologic/Lymphatic/Immunologic:   denies easy bruising, easy bleeding, lumps or bumps noted  Endocrine:   Denies increased thirst    ECOG Performance Status: 0    Physical Exam:  /79   Pulse 88   Temp 98.2  F (36.8  C) (Tympanic)   Resp 18   Ht 1.6 m (5' 3\")   Wt 75.8 kg (167 lb 1.7 oz)   SpO2 99%   BMI 29.60 kg/m      GENERAL APPEARANCE: Healthy, alert and in no acute distress.  HEENT: Normocephalic, Sclerae anicteric. Oropharynx without ulcers, lesions, or thrush.  NECK:   No asymmetry or masses, no thyromegaly.  LYMPHATICS: No palpable cervical, supraclavicular, axillary, or inguinal nodes   RESP: Lungs clear to auscultation bilaterally, respirations regular and easy  CARDIOVASCULAR: Regular rate and rhythm. Normal S1, S2; no murmur, gallop, or rub.  ABDOMEN: Soft, nontender. Bowel sounds auscultated all 4 quadrants. No palpable organomegaly or masses.  MUSCULOSKELETAL: Extremities without gross deformities noted. No edema of bilateral lower extremities.  NEURO: Alert and oriented x " 3.  Gait steady.  PSYCHIATRIC: Mentation and affect appear normal.  Mood appropriate.    Laboratory:    Results for orders placed or performed in visit on 09/30/20   Comprehensive metabolic panel     Status: Abnormal   Result Value Ref Range    Sodium 136 133 - 144 mmol/L    Potassium 3.6 3.4 - 5.3 mmol/L    Chloride 104 94 - 109 mmol/L    Carbon Dioxide 27 20 - 32 mmol/L    Anion Gap 5 3 - 14 mmol/L    Glucose 145 (H) 70 - 99 mg/dL    Urea Nitrogen 13 7 - 30 mg/dL    Creatinine 0.72 0.52 - 1.04 mg/dL    GFR Estimate 84 >60 mL/min/[1.73_m2]    GFR Estimate If Black >90 >60 mL/min/[1.73_m2]    Calcium 8.7 8.5 - 10.1 mg/dL    Bilirubin Total 0.4 0.2 - 1.3 mg/dL    Albumin 3.4 3.4 - 5.0 g/dL    Protein Total 7.5 6.8 - 8.8 g/dL    Alkaline Phosphatase 108 40 - 150 U/L    ALT 20 0 - 50 U/L    AST 14 0 - 45 U/L   CBC with platelets differential     Status: Abnormal   Result Value Ref Range    WBC 4.7 4.0 - 11.0 10e9/L    RBC Count 3.62 (L) 3.8 - 5.2 10e12/L    Hemoglobin 11.5 (L) 11.7 - 15.7 g/dL    Hematocrit 33.9 (L) 35.0 - 47.0 %    MCV 94 78 - 100 fl    MCH 31.8 26.5 - 33.0 pg    MCHC 33.9 31.5 - 36.5 g/dL    RDW 14.6 10.0 - 15.0 %    Platelet Count 168 150 - 450 10e9/L    Diff Method Automated Method     % Neutrophils 58.0 %    % Lymphocytes 24.2 %    % Monocytes 12.9 %    % Eosinophils 3.0 %    % Basophils 0.2 %    % Immature Granulocytes 1.7 %    Nucleated RBCs 0 0 /100    Absolute Neutrophil 2.7 1.6 - 8.3 10e9/L    Absolute Lymphocytes 1.1 0.8 - 5.3 10e9/L    Absolute Monocytes 0.6 0.0 - 1.3 10e9/L    Absolute Eosinophils 0.1 0.0 - 0.7 10e9/L    Absolute Basophils 0.0 0.0 - 0.2 10e9/L    Abs Immature Granulocytes 0.1 0 - 0.4 10e9/L    Absolute Nucleated RBC 0.0        Component      Latest Ref Rng & Units 9/24/2020   WBC      4.0 - 11.0 10e9/L 4.4   RBC Count      3.8 - 5.2 10e12/L 3.85   Hemoglobin      11.7 - 15.7 g/dL 12.2   Hematocrit      35.0 - 47.0 % 35.8   MCV      78 - 100 fl 93   MCH      26.5 - 33.0 pg  31.7   MCHC      31.5 - 36.5 g/dL 34.1   RDW      10.0 - 15.0 % 14.6   Platelet Count      150 - 450 10e9/L 218   Diff Method       Automated Method   % Neutrophils      % 56.5   % Lymphocytes      % 23.9   % Monocytes      % 15.2   % Eosinophils      % 2.5   % Basophils      % 0.5   % Immature Granulocytes      % 1.4   Nucleated RBCs      0 /100 0   Absolute Neutrophil      1.6 - 8.3 10e9/L 2.5   Absolute Lymphocytes      0.8 - 5.3 10e9/L 1.1   Absolute Monocytes      0.0 - 1.3 10e9/L 0.7   Absolute Eosinophils      0.0 - 0.7 10e9/L 0.1   Absolute Basophils      0.0 - 0.2 10e9/L 0.0   Abs Immature Granulocytes      0 - 0.4 10e9/L 0.1   Absolute Nucleated RBC       0.0   Albumin Fraction      3.7 - 5.1 g/dL 4.3   Alpha 1 Fraction      0.2 - 0.4 g/dL 0.3   Alpha 2 Fraction      0.5 - 0.9 g/dL 0.7   Beta Fraction      0.6 - 1.0 g/dL 0.6   Gamma Fraction      0.7 - 1.6 g/dL 1.9 (H)   Monoclonal Peak      0.0 g/dL 1.2 (H)   ELP Interpretation:       Monoclonal protein (about 1.2 g/dL) seen in the gamma fraction.  Previously characterized . . .   IGG      610 - 1,616 mg/dL 2,134 (H)   IGA      84 - 499 mg/dL 11 (L)   IGM      35 - 242 mg/dL 11 (L)   [place] Free Lt Chain      0.33 - 1.94 mg/dL 0.68   Lambda Free Lt Chain      0.57 - 2.63 mg/dL 0.25 (L)   Kappa Lambda Ratio      0.26 - 1.65 2.72 (H)   Beta-2-Microglobulin      <2.3 mg/L 2.2   Viscosity Index      1.4 - 1.8 1.7     Imaging Studies:   None completed for today's visit        ASSESSMENT/PLAN:    #1 Multiple myeloma:   IgG kappa multiple myeloma with 80% involvement of the bone marrow diagnosed June 2019 initially treated with Velcade and Decadron and received 4 cycles with increasing M-spike and kappa/lambda ratio.  Treatment changed to CyBorD on 10/1/2019 with elevating LFT's felt to be related to the Cytoxan so this was decreased with cycle 3 day 8.  M-spike down again to 1.2.  She will initiate cycle 14 today and follow-up prior to cycle 15-day 1 with  myeloma labs.       #2  Bone lesions: Continue Zometa every 3 months.       I encouraged patient to call with any questions or concerns.     Barbie Quintana, NP  APRN, FNP-BC, AOCNP

## 2020-09-28 NOTE — PROGRESS NOTES
Chief Complaint   Patient presents with     RECHECK PE TUBES     Pt is here for a tube check.  BTT placed 9/20/19.         Patient was seen on 12/10/19.   She was doing well and had normal ear exam (c/w BTT). She has been noticing increase in ear issues. She has felt more crackling, popping.   She did have some wax in her ears. Her left ear hearing has decreased over the last several times. Chemo has been weekly with management. And follows with Bonita VIRAMONTES NP.   She has been using Cory Med rinse, Flonase and Zyrtec. She has felt this has aided in symptoms control.        She has been feeling like she has noticing an increase in fullness in her ears.   She developed over the last few weeks. She had felt no chronic nasal congestion. She does not feel worsening congestion or URI symptoms.   She has been working on rinses, nasal sprays, AH.       Past Medical History:   Diagnosis Date     Arthritis      Depressive disorder      Essential hypertension 10/1/2015     Major depressive disorder, recurrent episode, mild (H) 10/1/2015     Mixed hyperlipidemia 10/1/2015     Multiple myeloma not having achieved remission (H) 6/24/2019     Other specified disorders of bladder 07/09/2012    Irritable Bladder     Seasonal allergies 10/1/2015     Unspecified essential hypertension 03/19/2007     Unspecified sinusitis (chronic) 09/05/2007        Allergies   Allergen Reactions     Lisinopril Cough     Phenylephrine Hcl Other (See Comments)     **Entex  HEADACHE (SEVERE)     Phenylpropanolamine Other (See Comments)     **Entex  HEADACHE (SEVERE)     Pseudoephedrine Tannate Other (See Comments)     **Entex  HEADACHE (SEVERE)     Levofloxacin Rash     **Levaquin     Moxifloxacin Hcl [Avelox] Rash     Current Outpatient Medications   Medication     acyclovir (ZOVIRAX) 400 MG tablet     aspirin (ASA) 81 MG chewable tablet     atorvastatin (LIPITOR) 10 MG tablet     Bortezomib (VELCADE IJ)     cyclophosphamide 50 MG PO capsule      dexamethasone (DECADRON) 4 MG tablet     EPINEPHrine (EPIPEN) 0.3 MG/0.3ML injection     fluticasone (FLONASE) 50 MCG/ACT nasal spray     LORazepam (ATIVAN) 0.5 MG tablet     losartan (COZAAR) 50 MG tablet     ondansetron (ZOFRAN) 8 MG tablet     prochlorperazine (COMPAZINE) 10 MG tablet     sertraline (ZOLOFT) 50 MG tablet     No current facility-administered medications for this visit.       ROS: 10 point ROS neg other than the symptoms noted above in the HPI.  /72   Pulse 77   Temp 96.7  F (35.9  C) (Tympanic)   SpO2 97%     General - The patient is well nourished and well developed, and appears to have good nutritional status.    Head and Face - Normocephalic and atraumatic, with no gross asymmetry noted of the contour of the facial features.  The facial nerve is intact, with strong symmetric movements.  Eyes - Extraocular movements intact, and the pupils were reactive to light.  Sclera were not icteric or injected, conjunctiva were pink and moist.  Mouth - Examination of the oral cavity shows pink, healthy, moist mucosa.  No lesions or ulceration noted.  The dentition are in good repair.  The tongue is mobile and midline.  Ears - Examination of the ears showed- Right tube in canal. Right TM appears intact. Left tube in position with dried debris around margin   Removed a portion of debris/ cerumen from tube. The tube is starting to extrude on left, but remains patent.     ASSESSMENT:    ICD-10-CM    1. Non-functioning tympanostomy tube, subsequent encounter  T85.618D    2. ETD (Eustachian tube dysfunction), bilateral  H69.83    3. Multiple myeloma not having achieved remission (H)  C90.00    4. Status post chemotherapy  Z92.21    5. Mixed hearing loss, bilateral  H90.6      Removed right tube. There are no concerns for effusion  Left tube is extruding   Recheck ears in 3 months    If changes in ear symptoms, return sooner.           Bridgette Ly PA-C  ENT  [facility], [place]  [phone]

## 2020-09-28 NOTE — LETTER
9/28/2020         RE: Carmelita Watts  [address] [place] [address]        Dear Colleague,    Thank you for referring your patient, Carmelita Watts, to the [facility]. Please see a copy of my visit note below.        Chief Complaint   Patient presents with     RECHECK PE TUBES     Pt is here for a tube check.  BTT placed 9/20/19.         Patient was seen on 12/10/19.   She was doing well and had normal ear exam (c/w BTT). She has been noticing increase in ear issues. She has felt more crackling, popping.   She did have some wax in her ears. Her left ear hearing has decreased over the last several times. Chemo has been weekly with management. And follows with Bonita VIRAMONTES NP.   She has been using Cory Med rinse, Flonase and Zyrtec. She has felt this has aided in symptoms control.        She has been feeling like she has noticing an increase in fullness in her ears.   She developed over the last few weeks. She had felt no chronic nasal congestion. She does not feel worsening congestion or URI symptoms.   She has been working on rinses, nasal sprays, AH.       Past Medical History:   Diagnosis Date     Arthritis      Depressive disorder      Essential hypertension 10/1/2015     Major depressive disorder, recurrent episode, mild (H) 10/1/2015     Mixed hyperlipidemia 10/1/2015     Multiple myeloma not having achieved remission (H) 6/24/2019     Other specified disorders of bladder 07/09/2012    Irritable Bladder     Seasonal allergies 10/1/2015     Unspecified essential hypertension 03/19/2007     Unspecified sinusitis (chronic) 09/05/2007        Allergies   Allergen Reactions     Lisinopril Cough     Phenylephrine Hcl Other (See Comments)     **Entex  HEADACHE (SEVERE)     Phenylpropanolamine Other (See Comments)     **Entex  HEADACHE (SEVERE)     Pseudoephedrine Tannate Other (See Comments)     **Entex  HEADACHE (SEVERE)     Levofloxacin Rash     **Levaquin     Moxifloxacin Hcl [Avelox] Rash      Current Outpatient Medications   Medication     acyclovir (ZOVIRAX) 400 MG tablet     aspirin (ASA) 81 MG chewable tablet     atorvastatin (LIPITOR) 10 MG tablet     Bortezomib (VELCADE IJ)     cyclophosphamide 50 MG PO capsule     dexamethasone (DECADRON) 4 MG tablet     EPINEPHrine (EPIPEN) 0.3 MG/0.3ML injection     fluticasone (FLONASE) 50 MCG/ACT nasal spray     LORazepam (ATIVAN) 0.5 MG tablet     losartan (COZAAR) 50 MG tablet     ondansetron (ZOFRAN) 8 MG tablet     prochlorperazine (COMPAZINE) 10 MG tablet     sertraline (ZOLOFT) 50 MG tablet     No current facility-administered medications for this visit.       ROS: 10 point ROS neg other than the symptoms noted above in the HPI.  /72   Pulse 77   Temp 96.7  F (35.9  C) (Tympanic)   SpO2 97%     General - The patient is well nourished and well developed, and appears to have good nutritional status.    Head and Face - Normocephalic and atraumatic, with no gross asymmetry noted of the contour of the facial features.  The facial nerve is intact, with strong symmetric movements.  Eyes - Extraocular movements intact, and the pupils were reactive to light.  Sclera were not icteric or injected, conjunctiva were pink and moist.  Mouth - Examination of the oral cavity shows pink, healthy, moist mucosa.  No lesions or ulceration noted.  The dentition are in good repair.  The tongue is mobile and midline.  Ears - Examination of the ears showed- Right tube in canal. Right TM appears intact. Left tube in position with dried debris around margin   Removed a portion of debris/ cerumen from tube. The tube is starting to extrude on left, but remains patent.     ASSESSMENT:    ICD-10-CM    1. Non-functioning tympanostomy tube, subsequent encounter  T85.618D    2. ETD (Eustachian tube dysfunction), bilateral  H69.83    3. Multiple myeloma not having achieved remission (H)  C90.00    4. Status post chemotherapy  Z92.21    5. Mixed hearing loss, bilateral  H90.6       Removed right tube. There are no concerns for effusion  Left tube is extruding   Recheck ears in 3 months    If changes in ear symptoms, return sooner.           Bridgette Ly PA-C  ENT  [facility], [place]  [phone]        Again, thank you for allowing me to participate in the care of your patient.        Sincerely,        Bridgette Ly PA-C

## 2020-09-28 NOTE — NURSING NOTE
"Chief Complaint   Patient presents with     RECHECK PE TUBES     Pt is here for a tube check.  BTT placed 9/20/19.       Initial /72   Pulse 77   Temp 96.7  F (35.9  C) (Tympanic)   SpO2 97%  Estimated body mass index is 29.45 kg/m  as calculated from the following:    Height as of 9/24/20: 1.6 m (5' 2.99\").    Weight as of 9/24/20: 75.4 kg (166 lb 3.6 oz).  Medication Reconciliation: complete  Radha Patel LPN  "

## 2020-09-30 ENCOUNTER — ONCOLOGY VISIT (OUTPATIENT)
Dept: ONCOLOGY | Facility: OTHER | Age: 70
End: 2020-09-30
Attending: NURSE PRACTITIONER
Payer: MEDICARE

## 2020-09-30 ENCOUNTER — INFUSION THERAPY VISIT (OUTPATIENT)
Dept: INFUSION THERAPY | Facility: OTHER | Age: 70
End: 2020-09-30
Attending: INTERNAL MEDICINE
Payer: MEDICARE

## 2020-09-30 VITALS
SYSTOLIC BLOOD PRESSURE: 123 MMHG | BODY MASS INDEX: 29.61 KG/M2 | HEIGHT: 63 IN | HEART RATE: 88 BPM | WEIGHT: 167.11 LBS | TEMPERATURE: 98.2 F | DIASTOLIC BLOOD PRESSURE: 79 MMHG | RESPIRATION RATE: 18 BRPM | OXYGEN SATURATION: 99 %

## 2020-09-30 VITALS
DIASTOLIC BLOOD PRESSURE: 79 MMHG | WEIGHT: 167.11 LBS | HEIGHT: 63 IN | BODY MASS INDEX: 29.61 KG/M2 | RESPIRATION RATE: 18 BRPM | TEMPERATURE: 98.2 F | SYSTOLIC BLOOD PRESSURE: 123 MMHG | HEART RATE: 88 BPM | OXYGEN SATURATION: 99 %

## 2020-09-30 DIAGNOSIS — C90.00 MULTIPLE MYELOMA NOT HAVING ACHIEVED REMISSION (H): Primary | ICD-10-CM

## 2020-09-30 DIAGNOSIS — M89.9 BONE LESION: ICD-10-CM

## 2020-09-30 LAB
ALBUMIN SERPL-MCNC: 3.4 G/DL (ref 3.4–5)
ALP SERPL-CCNC: 108 U/L (ref 40–150)
ALT SERPL W P-5'-P-CCNC: 20 U/L (ref 0–50)
ANION GAP SERPL CALCULATED.3IONS-SCNC: 5 MMOL/L (ref 3–14)
AST SERPL W P-5'-P-CCNC: 14 U/L (ref 0–45)
BASOPHILS # BLD AUTO: 0 10E9/L (ref 0–0.2)
BASOPHILS NFR BLD AUTO: 0.2 %
BILIRUB SERPL-MCNC: 0.4 MG/DL (ref 0.2–1.3)
BUN SERPL-MCNC: 13 MG/DL (ref 7–30)
CALCIUM SERPL-MCNC: 8.7 MG/DL (ref 8.5–10.1)
CHLORIDE SERPL-SCNC: 104 MMOL/L (ref 94–109)
CO2 SERPL-SCNC: 27 MMOL/L (ref 20–32)
CREAT SERPL-MCNC: 0.72 MG/DL (ref 0.52–1.04)
DIFFERENTIAL METHOD BLD: ABNORMAL
EOSINOPHIL # BLD AUTO: 0.1 10E9/L (ref 0–0.7)
EOSINOPHIL NFR BLD AUTO: 3 %
ERYTHROCYTE [DISTWIDTH] IN BLOOD BY AUTOMATED COUNT: 14.6 % (ref 10–15)
GFR SERPL CREATININE-BSD FRML MDRD: 84 ML/MIN/{1.73_M2}
GLUCOSE SERPL-MCNC: 145 MG/DL (ref 70–99)
HCT VFR BLD AUTO: 33.9 % (ref 35–47)
HGB BLD-MCNC: 11.5 G/DL (ref 11.7–15.7)
IMM GRANULOCYTES # BLD: 0.1 10E9/L (ref 0–0.4)
IMM GRANULOCYTES NFR BLD: 1.7 %
LYMPHOCYTES # BLD AUTO: 1.1 10E9/L (ref 0.8–5.3)
LYMPHOCYTES NFR BLD AUTO: 24.2 %
MCH RBC QN AUTO: 31.8 PG (ref 26.5–33)
MCHC RBC AUTO-ENTMCNC: 33.9 G/DL (ref 31.5–36.5)
MCV RBC AUTO: 94 FL (ref 78–100)
MONOCYTES # BLD AUTO: 0.6 10E9/L (ref 0–1.3)
MONOCYTES NFR BLD AUTO: 12.9 %
NEUTROPHILS # BLD AUTO: 2.7 10E9/L (ref 1.6–8.3)
NEUTROPHILS NFR BLD AUTO: 58 %
NRBC # BLD AUTO: 0 10*3/UL
NRBC BLD AUTO-RTO: 0 /100
PLATELET # BLD AUTO: 168 10E9/L (ref 150–450)
POTASSIUM SERPL-SCNC: 3.6 MMOL/L (ref 3.4–5.3)
PROT SERPL-MCNC: 7.5 G/DL (ref 6.8–8.8)
RBC # BLD AUTO: 3.62 10E12/L (ref 3.8–5.2)
SODIUM SERPL-SCNC: 136 MMOL/L (ref 133–144)
WBC # BLD AUTO: 4.7 10E9/L (ref 4–11)

## 2020-09-30 PROCEDURE — 36415 COLL VENOUS BLD VENIPUNCTURE: CPT | Mod: ZL | Performed by: NURSE PRACTITIONER

## 2020-09-30 PROCEDURE — 96401 CHEMO ANTI-NEOPL SQ/IM: CPT

## 2020-09-30 PROCEDURE — G0463 HOSPITAL OUTPT CLINIC VISIT: HCPCS | Mod: 25

## 2020-09-30 PROCEDURE — 25000128 H RX IP 250 OP 636: Mod: JW

## 2020-09-30 PROCEDURE — 85025 COMPLETE CBC W/AUTO DIFF WBC: CPT | Mod: ZL | Performed by: NURSE PRACTITIONER

## 2020-09-30 PROCEDURE — 99213 OFFICE O/P EST LOW 20 MIN: CPT | Mod: CR | Performed by: NURSE PRACTITIONER

## 2020-09-30 PROCEDURE — G0463 HOSPITAL OUTPT CLINIC VISIT: HCPCS

## 2020-09-30 PROCEDURE — 25000128 H RX IP 250 OP 636: Performed by: NURSE PRACTITIONER

## 2020-09-30 PROCEDURE — 80053 COMPREHEN METABOLIC PANEL: CPT | Mod: ZL | Performed by: NURSE PRACTITIONER

## 2020-09-30 RX ORDER — ONDANSETRON 4 MG/1
8 TABLET, FILM COATED ORAL ONCE
Status: COMPLETED | OUTPATIENT
Start: 2020-09-30 | End: 2020-09-30

## 2020-09-30 RX ADMIN — ONDANSETRON HYDROCHLORIDE 8 MG: 4 TABLET, FILM COATED ORAL at 13:59

## 2020-09-30 RX ADMIN — BORTEZOMIB 2.8 MG: 3.5 INJECTION, POWDER, LYOPHILIZED, FOR SOLUTION INTRAVENOUS; SUBCUTANEOUS at 14:49

## 2020-09-30 ASSESSMENT — MIFFLIN-ST. JEOR
SCORE: 1237.13
SCORE: 1237

## 2020-09-30 ASSESSMENT — PAIN SCALES - GENERAL
PAINLEVEL: NO PAIN (0)
PAINLEVEL: NO PAIN (0)

## 2020-09-30 NOTE — PATIENT INSTRUCTIONS
We will see you back as planned. If you have any questions or concerns, we can be reached Monday through Friday 8am - 430pm at [phone] ([facility] [place]). If you have concerns related to a potential reaction/side effect after hours/weekends/holiday's, please seek emergent medical care.

## 2020-09-30 NOTE — NURSING NOTE
"Chief Complaint   Patient presents with     RECHECK     Follow up Multiple myeloma not having achieved remission        Initial /79   Pulse 88   Temp 98.2  F (36.8  C) (Tympanic)   Resp 18   Ht 1.6 m (5' 3\")   Wt 75.8 kg (167 lb 1.7 oz)   SpO2 99%   BMI 29.60 kg/m   Estimated body mass index is 29.6 kg/m  as calculated from the following:    Height as of this encounter: 1.6 m (5' 3\").    Weight as of this encounter: 75.8 kg (167 lb 1.7 oz).  Medication Reconciliation: complete.  Immunizations and advance directives status reviewed. Pain scale =0 , PHQ-2=0.            Vilma Chi LPN    "

## 2020-09-30 NOTE — PROGRESS NOTES
Patient is a 70 year old female here today for injection of Velcade per order of . Patient meets parameters for today's infusion. Patient identified with two identifiers, order verified, and verbal consent for today's infusion obtained from patient. Written consent for treatment is on file and valid.    Today's lab values: WBC: 4.7, HGB: 11.5, PLT: 168  ANC: 2.7.     Patient meets order parameters for today's treatment.    Patient denies questions or concerns regarding injection and/or medication(s) being administered.    Independent dose check completed with UZMA Dumont.    Velcade injected SQ into right upper outer arm at 45 degree angle per protocol rotating sites. Patient tolerated injection without incident, no signs or symptoms of adverse reaction noted. Patient denies pain or discomfort.     Covered with a sterile bandage. Pt instructed to leave bandage intact for a minimum of one hour, and to call with questions or concerns. Patient states understanding, discharged.

## 2020-10-07 ENCOUNTER — INFUSION THERAPY VISIT (OUTPATIENT)
Dept: INFUSION THERAPY | Facility: OTHER | Age: 70
End: 2020-10-07
Attending: INTERNAL MEDICINE
Payer: MEDICARE

## 2020-10-07 VITALS
WEIGHT: 166.01 LBS | TEMPERATURE: 97.3 F | HEART RATE: 91 BPM | SYSTOLIC BLOOD PRESSURE: 125 MMHG | OXYGEN SATURATION: 94 % | RESPIRATION RATE: 18 BRPM | BODY MASS INDEX: 29.41 KG/M2 | DIASTOLIC BLOOD PRESSURE: 78 MMHG

## 2020-10-07 DIAGNOSIS — C90.00 MULTIPLE MYELOMA NOT HAVING ACHIEVED REMISSION (H): Primary | ICD-10-CM

## 2020-10-07 LAB
BASOPHILS # BLD AUTO: 0 10E9/L (ref 0–0.2)
BASOPHILS NFR BLD AUTO: 0.4 %
DIFFERENTIAL METHOD BLD: NORMAL
EOSINOPHIL # BLD AUTO: 0.1 10E9/L (ref 0–0.7)
EOSINOPHIL NFR BLD AUTO: 1.8 %
ERYTHROCYTE [DISTWIDTH] IN BLOOD BY AUTOMATED COUNT: 14.7 % (ref 10–15)
HCT VFR BLD AUTO: 37.5 % (ref 35–47)
HGB BLD-MCNC: 12.8 G/DL (ref 11.7–15.7)
IMM GRANULOCYTES # BLD: 0.1 10E9/L (ref 0–0.4)
IMM GRANULOCYTES NFR BLD: 1.8 %
LYMPHOCYTES # BLD AUTO: 1.2 10E9/L (ref 0.8–5.3)
LYMPHOCYTES NFR BLD AUTO: 21.1 %
MCH RBC QN AUTO: 32 PG (ref 26.5–33)
MCHC RBC AUTO-ENTMCNC: 34.1 G/DL (ref 31.5–36.5)
MCV RBC AUTO: 94 FL (ref 78–100)
MONOCYTES # BLD AUTO: 1 10E9/L (ref 0–1.3)
MONOCYTES NFR BLD AUTO: 18.2 %
NEUTROPHILS # BLD AUTO: 3.1 10E9/L (ref 1.6–8.3)
NEUTROPHILS NFR BLD AUTO: 56.7 %
NRBC # BLD AUTO: 0 10*3/UL
NRBC BLD AUTO-RTO: 0 /100
PLATELET # BLD AUTO: 198 10E9/L (ref 150–450)
RBC # BLD AUTO: 4 10E12/L (ref 3.8–5.2)
WBC # BLD AUTO: 5.5 10E9/L (ref 4–11)

## 2020-10-07 PROCEDURE — 36415 COLL VENOUS BLD VENIPUNCTURE: CPT | Mod: ZL | Performed by: NURSE PRACTITIONER

## 2020-10-07 PROCEDURE — 96401 CHEMO ANTI-NEOPL SQ/IM: CPT

## 2020-10-07 PROCEDURE — 85025 COMPLETE CBC W/AUTO DIFF WBC: CPT | Mod: ZL | Performed by: NURSE PRACTITIONER

## 2020-10-07 PROCEDURE — 96372 THER/PROPH/DIAG INJ SC/IM: CPT | Performed by: NURSE PRACTITIONER

## 2020-10-07 PROCEDURE — 250N000011 HC RX IP 250 OP 636: Performed by: NURSE PRACTITIONER

## 2020-10-07 RX ORDER — ONDANSETRON 4 MG/1
8 TABLET, FILM COATED ORAL ONCE
Status: COMPLETED | OUTPATIENT
Start: 2020-10-07 | End: 2020-10-07

## 2020-10-07 RX ADMIN — ONDANSETRON HYDROCHLORIDE 8 MG: 4 TABLET, FILM COATED ORAL at 14:00

## 2020-10-07 RX ADMIN — BORTEZOMIB 2.8 MG: 3.5 INJECTION, POWDER, LYOPHILIZED, FOR SOLUTION INTRAVENOUS; SUBCUTANEOUS at 14:04

## 2020-10-07 ASSESSMENT — PAIN SCALES - GENERAL: PAINLEVEL: NO PAIN (0)

## 2020-10-07 NOTE — PROGRESS NOTES
Patient is a 70 year old female  her today for injection of Velcade per order of . Patient identified with two identifiers, order verified, and verbal consent for today's infusion obtained from patient. Written consent for treatment is on file and valid.    Recent lab values: WBC: 5.5, HGB: 12.8, PLT: 198 ANC: 3.1.   Patient meets order parameters for today's treatment.    Velcade dose verified with UZMA Dumont, prior to release of drug.      Patient denies questions or concerns regarding injection and/or medication(s) being administered.    Velcade injected SQ into right back arm at 45 degree angle per protocol rotating sites. Patient tolerated injection without incident, no signs or symptoms of adverse reaction noted. Patient denies pain or discomfort.     Covered with a sterile bandage. Pt instructed to leave bandage intact for a minimum of one hour, and to call with questions or concerns. Copy of appointments, discharge instructions, and after visit summary (AVS) provided to patient. Patient states understanding, discharged.

## 2020-10-07 NOTE — PATIENT INSTRUCTIONS
We will see you back as planned. If you have any questions or concerns, we can be reached Monday through Friday 8am - 430pm at [phone] ([facility] [place]). If you have concerns related to a potential reaction/side effect after hours/weekends/holiday's, please seek emergent medical care.

## 2020-10-14 ENCOUNTER — INFUSION THERAPY VISIT (OUTPATIENT)
Dept: INFUSION THERAPY | Facility: OTHER | Age: 70
End: 2020-10-14
Attending: INTERNAL MEDICINE
Payer: MEDICARE

## 2020-10-14 VITALS
SYSTOLIC BLOOD PRESSURE: 116 MMHG | WEIGHT: 167.11 LBS | TEMPERATURE: 97.8 F | BODY MASS INDEX: 29.61 KG/M2 | DIASTOLIC BLOOD PRESSURE: 78 MMHG | RESPIRATION RATE: 16 BRPM | OXYGEN SATURATION: 94 % | HEART RATE: 76 BPM

## 2020-10-14 DIAGNOSIS — C90.00 MULTIPLE MYELOMA NOT HAVING ACHIEVED REMISSION (H): Primary | ICD-10-CM

## 2020-10-14 LAB
BASOPHILS # BLD AUTO: 0 10E9/L (ref 0–0.2)
BASOPHILS NFR BLD AUTO: 0.4 %
DIFFERENTIAL METHOD BLD: ABNORMAL
EOSINOPHIL # BLD AUTO: 0.1 10E9/L (ref 0–0.7)
EOSINOPHIL NFR BLD AUTO: 1.8 %
ERYTHROCYTE [DISTWIDTH] IN BLOOD BY AUTOMATED COUNT: 14.9 % (ref 10–15)
HCT VFR BLD AUTO: 35.9 % (ref 35–47)
HGB BLD-MCNC: 12.2 G/DL (ref 11.7–15.7)
IMM GRANULOCYTES # BLD: 0.1 10E9/L (ref 0–0.4)
IMM GRANULOCYTES NFR BLD: 2.4 %
LYMPHOCYTES # BLD AUTO: 1.2 10E9/L (ref 0.8–5.3)
LYMPHOCYTES NFR BLD AUTO: 25.4 %
MCH RBC QN AUTO: 32.2 PG (ref 26.5–33)
MCHC RBC AUTO-ENTMCNC: 34 G/DL (ref 31.5–36.5)
MCV RBC AUTO: 95 FL (ref 78–100)
MONOCYTES # BLD AUTO: 0.8 10E9/L (ref 0–1.3)
MONOCYTES NFR BLD AUTO: 17.5 %
NEUTROPHILS # BLD AUTO: 2.4 10E9/L (ref 1.6–8.3)
NEUTROPHILS NFR BLD AUTO: 52.5 %
NRBC # BLD AUTO: 0 10*3/UL
NRBC BLD AUTO-RTO: 0 /100
PLATELET # BLD AUTO: 223 10E9/L (ref 150–450)
RBC # BLD AUTO: 3.79 10E12/L (ref 3.8–5.2)
WBC # BLD AUTO: 4.6 10E9/L (ref 4–11)

## 2020-10-14 PROCEDURE — 96372 THER/PROPH/DIAG INJ SC/IM: CPT | Performed by: NURSE PRACTITIONER

## 2020-10-14 PROCEDURE — 85025 COMPLETE CBC W/AUTO DIFF WBC: CPT | Mod: ZL | Performed by: NURSE PRACTITIONER

## 2020-10-14 PROCEDURE — 36415 COLL VENOUS BLD VENIPUNCTURE: CPT | Mod: ZL | Performed by: NURSE PRACTITIONER

## 2020-10-14 PROCEDURE — 96401 CHEMO ANTI-NEOPL SQ/IM: CPT

## 2020-10-14 PROCEDURE — 250N000011 HC RX IP 250 OP 636: Performed by: NURSE PRACTITIONER

## 2020-10-14 RX ORDER — ONDANSETRON 4 MG/1
8 TABLET, FILM COATED ORAL ONCE
Status: COMPLETED | OUTPATIENT
Start: 2020-10-14 | End: 2020-10-14

## 2020-10-14 RX ADMIN — ONDANSETRON HYDROCHLORIDE 8 MG: 4 TABLET, FILM COATED ORAL at 14:28

## 2020-10-14 RX ADMIN — BORTEZOMIB 2.8 MG: 3.5 INJECTION, POWDER, LYOPHILIZED, FOR SOLUTION INTRAVENOUS; SUBCUTANEOUS at 14:32

## 2020-10-14 ASSESSMENT — PAIN SCALES - GENERAL: PAINLEVEL: NO PAIN (0)

## 2020-10-14 NOTE — PROGRESS NOTES
Subjective     Carmelita Watts is a 70 year old female who presents to clinic today for the following health issues:    HPI         Hyperlipidemia Follow-Up      Are you regularly taking any medication or supplement to lower your cholesterol?   Yes- lipitor    Are you having muscle aches or other side effects that you think could be caused by your cholesterol lowering medication?  No    Hypertension Follow-up      Do you check your blood pressure regularly outside of the clinic? No     Are you following a low salt diet? Yes    Are your blood pressures ever more than 140 on the top number (systolic) OR more   than 90 on the bottom number (diastolic), for example 140/90? No    Depression Followup    How are you doing with your depression since your last visit? No change    Are you having other symptoms that might be associated with depression? No    Have you had a significant life event?  Grief or Loss lost  in august      Are you feeling anxious or having panic attacks?   No    Do you have any concerns with your use of alcohol or other drugs? No    Social History     Tobacco Use     Smoking status: Never Smoker     Smokeless tobacco: Never Used     Tobacco comment: Tried to Quit (YES); QUIT in 1971; Passive Exposure (NO)   Substance Use Topics     Alcohol use: Yes     Comment: RARELY     Drug use: No     PHQ 11/26/2019 12/20/2019 5/27/2020   PHQ-9 Total Score 3 2 0   Q9: Thoughts of better off dead/self-harm past 2 weeks Not at all Not at all Not at all     MINAL-7 SCORE 10/3/2019 11/14/2019 5/27/2020   Total Score 2 3 0     Last PHQ-9 5/27/2020   1.  Little interest or pleasure in doing things 0   2.  Feeling down, depressed, or hopeless 0   3.  Trouble falling or staying asleep, or sleeping too much 0   4.  Feeling tired or having little energy 0   5.  Poor appetite or overeating 0   6.  Feeling bad about yourself 0   7.  Trouble concentrating 0   8.  Moving slowly or restless 0   Q9: Thoughts of better off  dead/self-harm past 2 weeks 0   PHQ-9 Total Score 0   Difficulty at work, home, or with people Not difficult at all     MINAL-7  5/27/2020   1. Feeling nervous, anxious, or on edge 0   2. Not being able to stop or control worrying 0   3. Worrying too much about different things 0   4. Trouble relaxing 0   5. Being so restless that it is hard to sit still 0   6. Becoming easily annoyed or irritable 0   7. Feeling afraid, as if something awful might happen 0   MINAL-7 Total Score 0   If you checked any problems, how difficult have they made it for you to do your work, take care of things at home, or get along with other people? Not difficult at all         Suicide Assessment Five-step Evaluation and Treatment (SAFE-T)      How many servings of fruits and vegetables do you eat daily?  2-3    On average, how many sweetened beverages do you drink each day (Examples: soda, juice, sweet tea, etc.  Do NOT count diet or artificially sweetened beverages)?   0    How many days per week do you exercise enough to make your heart beat faster? 5    How many minutes a day do you exercise enough to make your heart beat faster? 30 - 60    How many days per week do you miss taking your medication? 0        Review of Systems   Constitutional, HEENT, cardiovascular, pulmonary, gi and gu systems are negative, except as otherwise noted.      Objective    /68 (BP Location: Left arm, Patient Position: Chair, Cuff Size: Adult Regular)   Pulse 78   Temp 98.3  F (36.8  C) (Tympanic)   Resp 20   Wt 73.5 kg (162 lb)   SpO2 97%   BMI 28.70 kg/m    Body mass index is 28.7 kg/m .  Physical Exam   GENERAL: healthy, alert and no distress  EYES: Eyes grossly normal to inspection, PERRL and conjunctivae and sclerae normal  HENT: ear canals and TM's normal, nose and mouth without ulcers or lesions  NECK: no adenopathy, no asymmetry, masses, or scars and thyroid normal to palpation  RESP: lungs clear to auscultation - no rales, rhonchi or  wheezes  CV: regular rate and rhythm, normal S1 S2, no S3 or S4, no murmur, click or rub, no peripheral edema and peripheral pulses strong  ABDOMEN: soft, nontender, no hepatosplenomegaly, no masses and bowel sounds normal  MS: no gross musculoskeletal defects noted, no edema  SKIN: no suspicious lesions or rashes  PSYCH: mentation appears normal, her  has passed away, long discussion on this.     No results found for this or any previous visit (from the past 24 hour(s)).  No results found for any visits on 10/16/20.        Assessment & Plan     Need for prophylactic vaccination and inoculation against influenza  Given.   - FLUZONE HIGH DOSE 65+  [35697]  - ADMIN INFLUENZA (For MEDICARE Patients ONLY) []    Grief  Lost her . Long discussion.  She is also not in remission.      Hypertension goal BP (blood pressure) < 140/80  In range. Decadron face with cushingoid.     Mixed hyperlipidemia  Lipids are good. Reassured.     Elevated glucose  Checking due to random glucose of 145.   - Hemoglobin A1c        See Patient Instructions    No follow-ups on file.    VENKATESH Trevino  [facility] - [place]

## 2020-10-14 NOTE — PATIENT INSTRUCTIONS
We will see you back as planned. If you have any questions or concerns, we can be reached Monday through Friday 8am - 430pm at [phone] ([facility] [place] If you have concerns related to a potential reaction/side effect after hours/weekends/holiday's, please seek emergent medical care.

## 2020-10-16 ENCOUNTER — OFFICE VISIT (OUTPATIENT)
Dept: FAMILY MEDICINE | Facility: OTHER | Age: 70
End: 2020-10-16
Attending: PHYSICIAN ASSISTANT
Payer: COMMERCIAL

## 2020-10-16 VITALS
TEMPERATURE: 98.3 F | HEART RATE: 78 BPM | RESPIRATION RATE: 20 BRPM | WEIGHT: 162 LBS | SYSTOLIC BLOOD PRESSURE: 120 MMHG | DIASTOLIC BLOOD PRESSURE: 68 MMHG | BODY MASS INDEX: 28.7 KG/M2 | OXYGEN SATURATION: 97 %

## 2020-10-16 DIAGNOSIS — Z23 NEED FOR PROPHYLACTIC VACCINATION AND INOCULATION AGAINST INFLUENZA: Primary | ICD-10-CM

## 2020-10-16 DIAGNOSIS — E78.2 MIXED HYPERLIPIDEMIA: ICD-10-CM

## 2020-10-16 DIAGNOSIS — I10 HYPERTENSION GOAL BP (BLOOD PRESSURE) < 140/80: ICD-10-CM

## 2020-10-16 DIAGNOSIS — R73.09 ELEVATED GLUCOSE: ICD-10-CM

## 2020-10-16 DIAGNOSIS — F43.21 GRIEF: ICD-10-CM

## 2020-10-16 PROCEDURE — G0008 ADMIN INFLUENZA VIRUS VAC: HCPCS

## 2020-10-16 PROCEDURE — G0463 HOSPITAL OUTPT CLINIC VISIT: HCPCS | Mod: 25

## 2020-10-16 PROCEDURE — 99214 OFFICE O/P EST MOD 30 MIN: CPT | Performed by: PHYSICIAN ASSISTANT

## 2020-10-16 ASSESSMENT — ANXIETY QUESTIONNAIRES
GAD7 TOTAL SCORE: 1
2. NOT BEING ABLE TO STOP OR CONTROL WORRYING: NOT AT ALL
3. WORRYING TOO MUCH ABOUT DIFFERENT THINGS: SEVERAL DAYS
6. BECOMING EASILY ANNOYED OR IRRITABLE: NOT AT ALL
1. FEELING NERVOUS, ANXIOUS, OR ON EDGE: NOT AT ALL
5. BEING SO RESTLESS THAT IT IS HARD TO SIT STILL: NOT AT ALL
4. TROUBLE RELAXING: NOT AT ALL
7. FEELING AFRAID AS IF SOMETHING AWFUL MIGHT HAPPEN: NOT AT ALL

## 2020-10-16 ASSESSMENT — PATIENT HEALTH QUESTIONNAIRE - PHQ9: SUM OF ALL RESPONSES TO PHQ QUESTIONS 1-9: 4

## 2020-10-16 ASSESSMENT — PAIN SCALES - GENERAL: PAINLEVEL: NO PAIN (0)

## 2020-10-16 NOTE — NURSING NOTE
"Chief Complaint   Patient presents with     Lipids     Hypertension     Depression     Imm/Inj     Flu Shot       Initial /68 (BP Location: Left arm, Patient Position: Chair, Cuff Size: Adult Regular)   Pulse 78   Temp 98.3  F (36.8  C) (Tympanic)   Resp 20   Wt 73.5 kg (162 lb)   SpO2 97%   BMI 28.70 kg/m   Estimated body mass index is 28.7 kg/m  as calculated from the following:    Height as of 9/30/20: 1.6 m (5' 2.99\").    Weight as of this encounter: 73.5 kg (162 lb).  Medication Reconciliation: complete  Luz Maria Mcghee LPN    "

## 2020-10-17 ASSESSMENT — ANXIETY QUESTIONNAIRES: GAD7 TOTAL SCORE: 1

## 2020-10-19 DIAGNOSIS — C90.00 MULTIPLE MYELOMA NOT HAVING ACHIEVED REMISSION (H): ICD-10-CM

## 2020-10-19 DIAGNOSIS — R73.09 ELEVATED GLUCOSE: ICD-10-CM

## 2020-10-19 LAB
EST. AVERAGE GLUCOSE BLD GHB EST-MCNC: 140 MG/DL
HBA1C MFR BLD: 6.5 % (ref 0–5.6)

## 2020-10-19 PROCEDURE — 36416 COLLJ CAPILLARY BLOOD SPEC: CPT | Mod: ZL | Performed by: PHYSICIAN ASSISTANT

## 2020-10-19 PROCEDURE — 83036 HEMOGLOBIN GLYCOSYLATED A1C: CPT | Mod: ZL | Performed by: PHYSICIAN ASSISTANT

## 2020-10-19 PROCEDURE — 36415 COLL VENOUS BLD VENIPUNCTURE: CPT | Mod: ZL | Performed by: PHYSICIAN ASSISTANT

## 2020-10-19 PROCEDURE — 999N001182 HC STATISTIC ESTIMATED AVERAGE GLUCOSE: Mod: ZL | Performed by: PHYSICIAN ASSISTANT

## 2020-10-19 RX ORDER — ACYCLOVIR 400 MG/1
400 TABLET ORAL 2 TIMES DAILY
Qty: 60 TABLET | Refills: 3 | Status: SHIPPED | OUTPATIENT
Start: 2020-10-19 | End: 2021-07-27

## 2020-10-19 NOTE — TELEPHONE ENCOUNTER
Zovirax       Last Written Prescription Date:  5/27/2020  Last Fill Quantity: 60,   # refills: 3  Last Office Visit: 10/16/2020  Future Office visit:    Next 5 appointments (look out 90 days)    Oct 27, 2020 11:00 AM  (Arrive by 10:45 AM)  Return Visit with Elías Walker MD  [facility] ([facility] ) [address]  [place] [address]  [phone]   Nov 25, 2020  1:30 PM  (Arrive by 1:15 PM)  Return Visit with Barbie Quintana NP  [facility] ([facility] ) [address]  [place] [address]  [phone]   Dec 22, 2020  3:00 PM  (Arrive by 2:45 PM)  Return Visit with Elías Walker MD  [facility] ([facility] ) [address]  [place] [address]  [phone]   Dec 29, 2020  8:15 AM  (Arrive by 8:00 AM)  Return Visit with Bridgette Ly PA-C  [facility] ([facility] ) [address]  [place] [address]  [phone]

## 2020-10-21 ENCOUNTER — INFUSION THERAPY VISIT (OUTPATIENT)
Dept: INFUSION THERAPY | Facility: OTHER | Age: 70
End: 2020-10-21
Attending: INTERNAL MEDICINE
Payer: MEDICARE

## 2020-10-21 VITALS
WEIGHT: 167.99 LBS | HEART RATE: 82 BPM | SYSTOLIC BLOOD PRESSURE: 115 MMHG | DIASTOLIC BLOOD PRESSURE: 75 MMHG | BODY MASS INDEX: 29.77 KG/M2 | RESPIRATION RATE: 20 BRPM | OXYGEN SATURATION: 93 %

## 2020-10-21 DIAGNOSIS — C90.00 MULTIPLE MYELOMA NOT HAVING ACHIEVED REMISSION (H): Primary | ICD-10-CM

## 2020-10-21 LAB
BASOPHILS # BLD AUTO: 0 10E9/L (ref 0–0.2)
BASOPHILS NFR BLD AUTO: 0.3 %
DIFFERENTIAL METHOD BLD: ABNORMAL
EOSINOPHIL # BLD AUTO: 0.1 10E9/L (ref 0–0.7)
EOSINOPHIL NFR BLD AUTO: 1.4 %
ERYTHROCYTE [DISTWIDTH] IN BLOOD BY AUTOMATED COUNT: 14.6 % (ref 10–15)
HCT VFR BLD AUTO: 35.2 % (ref 35–47)
HGB BLD-MCNC: 11.9 G/DL (ref 11.7–15.7)
IMM GRANULOCYTES # BLD: 0.1 10E9/L (ref 0–0.4)
IMM GRANULOCYTES NFR BLD: 1.2 %
LYMPHOCYTES # BLD AUTO: 1.3 10E9/L (ref 0.8–5.3)
LYMPHOCYTES NFR BLD AUTO: 22 %
MCH RBC QN AUTO: 32.2 PG (ref 26.5–33)
MCHC RBC AUTO-ENTMCNC: 33.8 G/DL (ref 31.5–36.5)
MCV RBC AUTO: 95 FL (ref 78–100)
MONOCYTES # BLD AUTO: 0.7 10E9/L (ref 0–1.3)
MONOCYTES NFR BLD AUTO: 11.7 %
NEUTROPHILS # BLD AUTO: 3.6 10E9/L (ref 1.6–8.3)
NEUTROPHILS NFR BLD AUTO: 63.4 %
NRBC # BLD AUTO: 0 10*3/UL
NRBC BLD AUTO-RTO: 0 /100
PLATELET # BLD AUTO: 208 10E9/L (ref 150–450)
RBC # BLD AUTO: 3.7 10E12/L (ref 3.8–5.2)
WBC # BLD AUTO: 5.7 10E9/L (ref 4–11)

## 2020-10-21 PROCEDURE — 82784 ASSAY IGA/IGD/IGG/IGM EACH: CPT | Mod: ZL | Performed by: NURSE PRACTITIONER

## 2020-10-21 PROCEDURE — 85025 COMPLETE CBC W/AUTO DIFF WBC: CPT | Mod: ZL | Performed by: NURSE PRACTITIONER

## 2020-10-21 PROCEDURE — 85810 BLOOD VISCOSITY EXAMINATION: CPT | Mod: ZL | Performed by: NURSE PRACTITIONER

## 2020-10-21 PROCEDURE — 84165 PROTEIN E-PHORESIS SERUM: CPT | Mod: TC,ZL | Performed by: NURSE PRACTITIONER

## 2020-10-21 PROCEDURE — 250N000011 HC RX IP 250 OP 636: Mod: JW

## 2020-10-21 PROCEDURE — 999N001036 HC STATISTIC TOTAL PROTEIN: Mod: ZL | Performed by: NURSE PRACTITIONER

## 2020-10-21 PROCEDURE — 83883 ASSAY NEPHELOMETRY NOT SPEC: CPT | Mod: ZL | Performed by: NURSE PRACTITIONER

## 2020-10-21 PROCEDURE — 96401 CHEMO ANTI-NEOPL SQ/IM: CPT

## 2020-10-21 PROCEDURE — 84165 PROTEIN E-PHORESIS SERUM: CPT | Mod: 26 | Performed by: PATHOLOGY

## 2020-10-21 PROCEDURE — 250N000011 HC RX IP 250 OP 636: Performed by: NURSE PRACTITIONER

## 2020-10-21 PROCEDURE — 82232 ASSAY OF BETA-2 PROTEIN: CPT | Mod: ZL | Performed by: NURSE PRACTITIONER

## 2020-10-21 PROCEDURE — 36415 COLL VENOUS BLD VENIPUNCTURE: CPT | Mod: ZL | Performed by: NURSE PRACTITIONER

## 2020-10-21 RX ORDER — ONDANSETRON 4 MG/1
8 TABLET, FILM COATED ORAL ONCE
Status: COMPLETED | OUTPATIENT
Start: 2020-10-21 | End: 2020-10-21

## 2020-10-21 RX ADMIN — BORTEZOMIB 2.8 MG: 3.5 INJECTION, POWDER, LYOPHILIZED, FOR SOLUTION INTRAVENOUS; SUBCUTANEOUS at 14:17

## 2020-10-21 RX ADMIN — ONDANSETRON HYDROCHLORIDE 8 MG: 4 TABLET, FILM COATED ORAL at 14:17

## 2020-10-21 NOTE — PATIENT INSTRUCTIONS
We will see you back as planned. If you have any questions or concerns, we can be reached Monday through Friday 8am - 430pm at [phone] ([facility] [place]). If you have concerns related to a potential reaction/side effect after hours/weekends/holiday's, please seek emergent medical care.

## 2020-10-21 NOTE — PROGRESS NOTES
Patient is a 70 year old female  her today for injection of Velcade per order of . Patient identified with two identifiers, order verified, and verbal consent for today's infusion obtained from patient. Written consent for treatment is on file and valid.    Recent lab values: WBC: 5.7, HGB: 11.9, PLT: 208  ANC: 3.6.   Patient meets order parameters for today's treatment.    Velcade dose verified with Julia ADEN RN, prior to release of drug.      Patient denies questions or concerns regarding injection and/or medication(s) being administered.    Velcade injected SQ into right back arm at 45 degree angle per protocol rotating sites. Patient tolerated injection without incident, no signs or symptoms of adverse reaction noted. Patient denies pain or discomfort.     Covered with a sterile bandage. Pt instructed to leave bandage intact for a minimum of one hour, and to call with questions or concerns. Copy of appointments, discharge instructions, and after visit summary (AVS) provided to patient. Patient states understanding, discharged.

## 2020-10-23 LAB
ALBUMIN SERPL ELPH-MCNC: 4.2 G/DL (ref 3.7–5.1)
ALPHA1 GLOB SERPL ELPH-MCNC: 0.3 G/DL (ref 0.2–0.4)
ALPHA2 GLOB SERPL ELPH-MCNC: 0.7 G/DL (ref 0.5–0.9)
B-GLOBULIN SERPL ELPH-MCNC: 0.6 G/DL (ref 0.6–1)
B2 MICROGLOB SERPL-MCNC: 2.9 MG/L
GAMMA GLOB SERPL ELPH-MCNC: 1.8 G/DL (ref 0.7–1.6)
IGA SERPL-MCNC: 10 MG/DL (ref 84–499)
IGG SERPL-MCNC: 1995 MG/DL (ref 610–1616)
IGM SERPL-MCNC: 11 MG/DL (ref 35–242)
KAPPA LC UR-MCNC: 0.63 MG/DL (ref 0.33–1.94)
KAPPA LC/LAMBDA SER: 1.8 {RATIO} (ref 0.26–1.65)
LAMBDA LC SERPL-MCNC: 0.35 MG/DL (ref 0.57–2.63)
M PROTEIN SERPL ELPH-MCNC: 1.2 G/DL
PROT PATTERN SERPL ELPH-IMP: ABNORMAL
VISC SER: 1.6 CP (ref 1.4–1.8)

## 2020-10-26 DIAGNOSIS — C90.00 MULTIPLE MYELOMA NOT HAVING ACHIEVED REMISSION (H): Primary | ICD-10-CM

## 2020-10-26 RX ORDER — EPINEPHRINE 0.3 MG/.3ML
0.3 INJECTION SUBCUTANEOUS EVERY 5 MIN PRN
Status: CANCELLED | OUTPATIENT
Start: 2020-11-17

## 2020-10-26 RX ORDER — EPINEPHRINE 0.3 MG/.3ML
0.3 INJECTION SUBCUTANEOUS EVERY 5 MIN PRN
Status: CANCELLED | OUTPATIENT
Start: 2020-11-03

## 2020-10-26 RX ORDER — MEPERIDINE HYDROCHLORIDE 50 MG/ML
25 INJECTION INTRAMUSCULAR; INTRAVENOUS; SUBCUTANEOUS EVERY 30 MIN PRN
Status: CANCELLED | OUTPATIENT
Start: 2020-11-17

## 2020-10-26 RX ORDER — METHYLPREDNISOLONE SODIUM SUCCINATE 125 MG/2ML
125 INJECTION, POWDER, LYOPHILIZED, FOR SOLUTION INTRAMUSCULAR; INTRAVENOUS
Status: CANCELLED
Start: 2020-11-17

## 2020-10-26 RX ORDER — ONDANSETRON 4 MG/1
8 TABLET, FILM COATED ORAL ONCE
Status: CANCELLED | OUTPATIENT
Start: 2020-11-03 | End: 2020-11-03

## 2020-10-26 RX ORDER — LORAZEPAM 2 MG/ML
0.5 INJECTION INTRAMUSCULAR EVERY 4 HOURS PRN
Status: CANCELLED
Start: 2020-11-17

## 2020-10-26 RX ORDER — MEPERIDINE HYDROCHLORIDE 50 MG/ML
25 INJECTION INTRAMUSCULAR; INTRAVENOUS; SUBCUTANEOUS EVERY 30 MIN PRN
Status: CANCELLED | OUTPATIENT
Start: 2020-11-10

## 2020-10-26 RX ORDER — ALBUTEROL SULFATE 90 UG/1
1-2 AEROSOL, METERED RESPIRATORY (INHALATION)
Status: CANCELLED
Start: 2020-11-10

## 2020-10-26 RX ORDER — MEPERIDINE HYDROCHLORIDE 50 MG/ML
25 INJECTION INTRAMUSCULAR; INTRAVENOUS; SUBCUTANEOUS EVERY 30 MIN PRN
Status: CANCELLED | OUTPATIENT
Start: 2020-10-27

## 2020-10-26 RX ORDER — EPINEPHRINE 1 MG/ML
0.3 INJECTION, SOLUTION, CONCENTRATE INTRAVENOUS EVERY 5 MIN PRN
Status: CANCELLED | OUTPATIENT
Start: 2020-10-27

## 2020-10-26 RX ORDER — EPINEPHRINE 0.3 MG/.3ML
0.3 INJECTION SUBCUTANEOUS EVERY 5 MIN PRN
Status: CANCELLED | OUTPATIENT
Start: 2020-10-27

## 2020-10-26 RX ORDER — ALBUTEROL SULFATE 90 UG/1
1-2 AEROSOL, METERED RESPIRATORY (INHALATION)
Status: CANCELLED
Start: 2020-11-17

## 2020-10-26 RX ORDER — ALBUTEROL SULFATE 90 UG/1
1-2 AEROSOL, METERED RESPIRATORY (INHALATION)
Status: CANCELLED
Start: 2020-10-27

## 2020-10-26 RX ORDER — MEPERIDINE HYDROCHLORIDE 50 MG/ML
25 INJECTION INTRAMUSCULAR; INTRAVENOUS; SUBCUTANEOUS EVERY 30 MIN PRN
Status: CANCELLED | OUTPATIENT
Start: 2020-11-03

## 2020-10-26 RX ORDER — NALOXONE HYDROCHLORIDE 0.4 MG/ML
.1-.4 INJECTION, SOLUTION INTRAMUSCULAR; INTRAVENOUS; SUBCUTANEOUS
Status: CANCELLED | OUTPATIENT
Start: 2020-11-03

## 2020-10-26 RX ORDER — METHYLPREDNISOLONE SODIUM SUCCINATE 125 MG/2ML
125 INJECTION, POWDER, LYOPHILIZED, FOR SOLUTION INTRAMUSCULAR; INTRAVENOUS
Status: CANCELLED
Start: 2020-10-27

## 2020-10-26 RX ORDER — ONDANSETRON 4 MG/1
8 TABLET, FILM COATED ORAL ONCE
Status: CANCELLED | OUTPATIENT
Start: 2020-10-27 | End: 2020-10-27

## 2020-10-26 RX ORDER — LORAZEPAM 2 MG/ML
0.5 INJECTION INTRAMUSCULAR EVERY 4 HOURS PRN
Status: CANCELLED
Start: 2020-10-27

## 2020-10-26 RX ORDER — ALBUTEROL SULFATE 90 UG/1
1-2 AEROSOL, METERED RESPIRATORY (INHALATION)
Status: CANCELLED
Start: 2020-11-03

## 2020-10-26 RX ORDER — NALOXONE HYDROCHLORIDE 0.4 MG/ML
.1-.4 INJECTION, SOLUTION INTRAMUSCULAR; INTRAVENOUS; SUBCUTANEOUS
Status: CANCELLED | OUTPATIENT
Start: 2020-11-17

## 2020-10-26 RX ORDER — METHYLPREDNISOLONE SODIUM SUCCINATE 125 MG/2ML
125 INJECTION, POWDER, LYOPHILIZED, FOR SOLUTION INTRAMUSCULAR; INTRAVENOUS
Status: CANCELLED
Start: 2020-11-03

## 2020-10-26 RX ORDER — ALBUTEROL SULFATE 0.83 MG/ML
2.5 SOLUTION RESPIRATORY (INHALATION)
Status: CANCELLED | OUTPATIENT
Start: 2020-10-27

## 2020-10-26 RX ORDER — SODIUM CHLORIDE 9 MG/ML
1000 INJECTION, SOLUTION INTRAVENOUS CONTINUOUS PRN
Status: CANCELLED
Start: 2020-11-17

## 2020-10-26 RX ORDER — NALOXONE HYDROCHLORIDE 0.4 MG/ML
.1-.4 INJECTION, SOLUTION INTRAMUSCULAR; INTRAVENOUS; SUBCUTANEOUS
Status: CANCELLED | OUTPATIENT
Start: 2020-10-27

## 2020-10-26 RX ORDER — LORAZEPAM 2 MG/ML
0.5 INJECTION INTRAMUSCULAR EVERY 4 HOURS PRN
Status: CANCELLED
Start: 2020-11-10

## 2020-10-26 RX ORDER — ALBUTEROL SULFATE 0.83 MG/ML
2.5 SOLUTION RESPIRATORY (INHALATION)
Status: CANCELLED | OUTPATIENT
Start: 2020-11-03

## 2020-10-26 RX ORDER — EPINEPHRINE 1 MG/ML
0.3 INJECTION, SOLUTION, CONCENTRATE INTRAVENOUS EVERY 5 MIN PRN
Status: CANCELLED | OUTPATIENT
Start: 2020-11-03

## 2020-10-26 RX ORDER — ALBUTEROL SULFATE 0.83 MG/ML
2.5 SOLUTION RESPIRATORY (INHALATION)
Status: CANCELLED | OUTPATIENT
Start: 2020-11-10

## 2020-10-26 RX ORDER — SODIUM CHLORIDE 9 MG/ML
1000 INJECTION, SOLUTION INTRAVENOUS CONTINUOUS PRN
Status: CANCELLED
Start: 2020-10-27

## 2020-10-26 RX ORDER — DIPHENHYDRAMINE HYDROCHLORIDE 50 MG/ML
50 INJECTION INTRAMUSCULAR; INTRAVENOUS
Status: CANCELLED
Start: 2020-11-03

## 2020-10-26 RX ORDER — EPINEPHRINE 1 MG/ML
0.3 INJECTION, SOLUTION, CONCENTRATE INTRAVENOUS EVERY 5 MIN PRN
Status: CANCELLED | OUTPATIENT
Start: 2020-11-17

## 2020-10-26 RX ORDER — ONDANSETRON 4 MG/1
8 TABLET, FILM COATED ORAL ONCE
Status: CANCELLED | OUTPATIENT
Start: 2020-11-10 | End: 2020-11-10

## 2020-10-26 RX ORDER — DIPHENHYDRAMINE HYDROCHLORIDE 50 MG/ML
50 INJECTION INTRAMUSCULAR; INTRAVENOUS
Status: CANCELLED
Start: 2020-11-17

## 2020-10-26 RX ORDER — NALOXONE HYDROCHLORIDE 0.4 MG/ML
.1-.4 INJECTION, SOLUTION INTRAMUSCULAR; INTRAVENOUS; SUBCUTANEOUS
Status: CANCELLED | OUTPATIENT
Start: 2020-11-10

## 2020-10-26 RX ORDER — DIPHENHYDRAMINE HYDROCHLORIDE 50 MG/ML
50 INJECTION INTRAMUSCULAR; INTRAVENOUS
Status: CANCELLED
Start: 2020-11-10

## 2020-10-26 RX ORDER — EPINEPHRINE 1 MG/ML
0.3 INJECTION, SOLUTION, CONCENTRATE INTRAVENOUS EVERY 5 MIN PRN
Status: CANCELLED | OUTPATIENT
Start: 2020-11-10

## 2020-10-26 RX ORDER — EPINEPHRINE 0.3 MG/.3ML
0.3 INJECTION SUBCUTANEOUS EVERY 5 MIN PRN
Status: CANCELLED | OUTPATIENT
Start: 2020-11-10

## 2020-10-26 RX ORDER — ONDANSETRON 4 MG/1
8 TABLET, FILM COATED ORAL ONCE
Status: CANCELLED
Start: 2020-11-17 | End: 2020-11-17

## 2020-10-26 RX ORDER — ALBUTEROL SULFATE 0.83 MG/ML
2.5 SOLUTION RESPIRATORY (INHALATION)
Status: CANCELLED | OUTPATIENT
Start: 2020-11-17

## 2020-10-26 RX ORDER — SODIUM CHLORIDE 9 MG/ML
1000 INJECTION, SOLUTION INTRAVENOUS CONTINUOUS PRN
Status: CANCELLED
Start: 2020-11-03

## 2020-10-26 RX ORDER — METHYLPREDNISOLONE SODIUM SUCCINATE 125 MG/2ML
125 INJECTION, POWDER, LYOPHILIZED, FOR SOLUTION INTRAMUSCULAR; INTRAVENOUS
Status: CANCELLED
Start: 2020-11-10

## 2020-10-26 RX ORDER — LORAZEPAM 2 MG/ML
0.5 INJECTION INTRAMUSCULAR EVERY 4 HOURS PRN
Status: CANCELLED
Start: 2020-11-03

## 2020-10-26 RX ORDER — DIPHENHYDRAMINE HYDROCHLORIDE 50 MG/ML
50 INJECTION INTRAMUSCULAR; INTRAVENOUS
Status: CANCELLED
Start: 2020-10-27

## 2020-10-26 RX ORDER — SODIUM CHLORIDE 9 MG/ML
1000 INJECTION, SOLUTION INTRAVENOUS CONTINUOUS PRN
Status: CANCELLED
Start: 2020-11-10

## 2020-10-27 ENCOUNTER — INFUSION THERAPY VISIT (OUTPATIENT)
Dept: INFUSION THERAPY | Facility: OTHER | Age: 70
End: 2020-10-27
Attending: INTERNAL MEDICINE
Payer: MEDICARE

## 2020-10-27 ENCOUNTER — INFUSION THERAPY VISIT (OUTPATIENT)
Dept: INFUSION THERAPY | Facility: OTHER | Age: 70
End: 2020-10-27
Attending: NURSE PRACTITIONER
Payer: MEDICARE

## 2020-10-27 ENCOUNTER — ONCOLOGY VISIT (OUTPATIENT)
Dept: ONCOLOGY | Facility: OTHER | Age: 70
End: 2020-10-27
Attending: NURSE PRACTITIONER
Payer: MEDICARE

## 2020-10-27 VITALS
OXYGEN SATURATION: 97 % | DIASTOLIC BLOOD PRESSURE: 82 MMHG | SYSTOLIC BLOOD PRESSURE: 122 MMHG | TEMPERATURE: 97.9 F | WEIGHT: 167.99 LBS | BODY MASS INDEX: 29.77 KG/M2 | HEIGHT: 63 IN | HEART RATE: 79 BPM

## 2020-10-27 VITALS
HEART RATE: 79 BPM | OXYGEN SATURATION: 97 % | DIASTOLIC BLOOD PRESSURE: 82 MMHG | SYSTOLIC BLOOD PRESSURE: 122 MMHG | WEIGHT: 167.99 LBS | BODY MASS INDEX: 29.77 KG/M2 | RESPIRATION RATE: 16 BRPM | HEIGHT: 63 IN | TEMPERATURE: 97.9 F

## 2020-10-27 DIAGNOSIS — C90.00 MULTIPLE MYELOMA NOT HAVING ACHIEVED REMISSION (H): Primary | ICD-10-CM

## 2020-10-27 LAB
ALBUMIN SERPL-MCNC: 3.4 G/DL (ref 3.4–5)
ALP SERPL-CCNC: 97 U/L (ref 40–150)
ALT SERPL W P-5'-P-CCNC: 20 U/L (ref 0–50)
ANION GAP SERPL CALCULATED.3IONS-SCNC: 3 MMOL/L (ref 3–14)
AST SERPL W P-5'-P-CCNC: 14 U/L (ref 0–45)
BASOPHILS # BLD AUTO: 0 10E9/L (ref 0–0.2)
BASOPHILS NFR BLD AUTO: 0.5 %
BILIRUB SERPL-MCNC: 0.4 MG/DL (ref 0.2–1.3)
BUN SERPL-MCNC: 15 MG/DL (ref 7–30)
CALCIUM SERPL-MCNC: 8.4 MG/DL (ref 8.5–10.1)
CHLORIDE SERPL-SCNC: 107 MMOL/L (ref 94–109)
CO2 SERPL-SCNC: 27 MMOL/L (ref 20–32)
CREAT SERPL-MCNC: 0.67 MG/DL (ref 0.52–1.04)
DIFFERENTIAL METHOD BLD: ABNORMAL
EOSINOPHIL # BLD AUTO: 0.1 10E9/L (ref 0–0.7)
EOSINOPHIL NFR BLD AUTO: 1.7 %
ERYTHROCYTE [DISTWIDTH] IN BLOOD BY AUTOMATED COUNT: 14.6 % (ref 10–15)
GFR SERPL CREATININE-BSD FRML MDRD: 88 ML/MIN/{1.73_M2}
GLUCOSE SERPL-MCNC: 99 MG/DL (ref 70–99)
HCT VFR BLD AUTO: 33.9 % (ref 35–47)
HGB BLD-MCNC: 11.4 G/DL (ref 11.7–15.7)
IMM GRANULOCYTES # BLD: 0.1 10E9/L (ref 0–0.4)
IMM GRANULOCYTES NFR BLD: 2.2 %
LYMPHOCYTES # BLD AUTO: 1.1 10E9/L (ref 0.8–5.3)
LYMPHOCYTES NFR BLD AUTO: 26.8 %
MCH RBC QN AUTO: 31.9 PG (ref 26.5–33)
MCHC RBC AUTO-ENTMCNC: 33.6 G/DL (ref 31.5–36.5)
MCV RBC AUTO: 95 FL (ref 78–100)
MONOCYTES # BLD AUTO: 0.8 10E9/L (ref 0–1.3)
MONOCYTES NFR BLD AUTO: 18.9 %
NEUTROPHILS # BLD AUTO: 2 10E9/L (ref 1.6–8.3)
NEUTROPHILS NFR BLD AUTO: 49.9 %
NRBC # BLD AUTO: 0 10*3/UL
NRBC BLD AUTO-RTO: 0 /100
PLATELET # BLD AUTO: 181 10E9/L (ref 150–450)
POTASSIUM SERPL-SCNC: 4.2 MMOL/L (ref 3.4–5.3)
PROT SERPL-MCNC: 7.5 G/DL (ref 6.8–8.8)
RBC # BLD AUTO: 3.57 10E12/L (ref 3.8–5.2)
SODIUM SERPL-SCNC: 137 MMOL/L (ref 133–144)
WBC # BLD AUTO: 4.1 10E9/L (ref 4–11)

## 2020-10-27 PROCEDURE — G0463 HOSPITAL OUTPT CLINIC VISIT: HCPCS | Mod: 25

## 2020-10-27 PROCEDURE — G0463 HOSPITAL OUTPT CLINIC VISIT: HCPCS

## 2020-10-27 PROCEDURE — 96401 CHEMO ANTI-NEOPL SQ/IM: CPT

## 2020-10-27 PROCEDURE — 99213 OFFICE O/P EST LOW 20 MIN: CPT | Performed by: INTERNAL MEDICINE

## 2020-10-27 PROCEDURE — 250N000011 HC RX IP 250 OP 636: Performed by: INTERNAL MEDICINE

## 2020-10-27 PROCEDURE — 85025 COMPLETE CBC W/AUTO DIFF WBC: CPT | Mod: ZL | Performed by: INTERNAL MEDICINE

## 2020-10-27 PROCEDURE — 80053 COMPREHEN METABOLIC PANEL: CPT | Mod: ZL | Performed by: INTERNAL MEDICINE

## 2020-10-27 PROCEDURE — 36415 COLL VENOUS BLD VENIPUNCTURE: CPT | Mod: ZL | Performed by: INTERNAL MEDICINE

## 2020-10-27 PROCEDURE — 250N000011 HC RX IP 250 OP 636: Mod: JW

## 2020-10-27 RX ORDER — ONDANSETRON 4 MG/1
8 TABLET, FILM COATED ORAL ONCE
Status: COMPLETED | OUTPATIENT
Start: 2020-10-27 | End: 2020-10-27

## 2020-10-27 RX ADMIN — ONDANSETRON HYDROCHLORIDE 8 MG: 4 TABLET, FILM COATED ORAL at 12:45

## 2020-10-27 RX ADMIN — BORTEZOMIB 2.8 MG: 3.5 INJECTION, POWDER, LYOPHILIZED, FOR SOLUTION INTRAVENOUS; SUBCUTANEOUS at 12:46

## 2020-10-27 ASSESSMENT — MIFFLIN-ST. JEOR
SCORE: 1241.13
SCORE: 1241.13

## 2020-10-27 ASSESSMENT — PAIN SCALES - GENERAL
PAINLEVEL: NO PAIN (0)
PAINLEVEL: NO PAIN (0)

## 2020-10-27 NOTE — PROGRESS NOTES
Peripheral labs ordered. Butterfly needle inserted into ***.  Immediate blood return noted. *** lab tubes drawn. Needle removed, covered with sterile guaze and coban. Patient tolerated well, denies pain or discomfort at this time. Patient discharged.

## 2020-10-27 NOTE — PROGRESS NOTES
Call to lab to draw pt labs, and informed by Anna that they will not be able to come to unit at this time. KAYLEE Prado asked by this nurse to draw labs.

## 2020-10-27 NOTE — PROGRESS NOTES
Peripheral labs ordered. Butterfly needle inserted into RT arm.  Immediate blood return noted. CBC and CMP lab tubes drawn. Needle removed, covered with sterile guaze and coban. Patient tolerated well, denies pain or discomfort at this time.

## 2020-10-27 NOTE — PATIENT INSTRUCTIONS
We would like to see you back per calender. Please come 30minute(s) prior for lab work.  When you are in need of a refill of your medications, please call your pharmacy and they will send us the request. If you have any questions please call [phone]

## 2020-10-27 NOTE — PROGRESS NOTES
Patient is a 70 year old female here today for injection of Velcade per order of . Patient meets parameters for today's infusion. Patient identified with two identifiers, order verified, and verbal consent for today's infusion obtained from patient. Written consent for treatment is on file and valid.    Recent lab values: WBC: 4.1, HGB: 11.4, PLT: 181  ANC: 2.0. Patient meets order parameters for today's treatment.  Patient denies questions or concerns regarding injection and/or medication(s) being administered.  Velcade injected SQ into left upper outer arm  per protocol rotating sites.     Injection administered per protocol. Patient tolerated infusion without incident, no signs or symptoms of adverse reaction noted. Patient denies pain or discomfort.     Covered with a sterile bandage. Pt instructed to leave bandage intact for a minimum of one hour, and to call with questions or concerns. Copy of appointments, discharge instructions, and after visit summary (AVS) provided to patient. Patient states understanding, discharged ambulatory.

## 2020-10-27 NOTE — NURSING NOTE
"Chief Complaint   Patient presents with     RECHECK     multiple myeloma       Initial /82   Pulse 79   Temp 97.9  F (36.6  C) (Tympanic)   Ht 1.6 m (5' 3\")   Wt 76.2 kg (167 lb 15.9 oz)   SpO2 97%   BMI 29.76 kg/m   Estimated body mass index is 29.76 kg/m  as calculated from the following:    Height as of this encounter: 1.6 m (5' 3\").    Weight as of this encounter: 76.2 kg (167 lb 15.9 oz).  Medication Reconciliation: complete     Immunizations and advance directives status reviewed. Pain scale 0 , PHQ-2 0.          Elena Holcomb LPN  "

## 2020-10-28 NOTE — PROGRESS NOTES
Visit Date:   10/27/2020      HISTORY OF PRESENT ILLNESS:  Ms. Watts returns for followup of IgA multiple myeloma.  For details, see previous notes.  The patient had been initiated on the CyBorD regimen after she progressed on Velcade and Decadron.  The patient was started on CyBorD regimen with Velcade given at a dose of 1.5 mg/m2 weekly on days #1, #8, #15 and #22 with 28-day regimen, Cytoxan given at a dose 140 mg/m2 weekly 200 mg weekly on days #1, #8, #15 and #22.  She was receiving a reduced dose of Cytoxan based on her immunosuppressed state.  She was also continued on with Zovirax prophylaxis, dexamethasone given 40 mg weekly on days #1, #8, #15 and #22.  After #1 cycle, her M spike dropped as well as IgA level.  The patient was continued on the CyBorD regimen.  She completed 13 cycles.  Her cycle of Cytoxan dose had to be reduced to 150 mg every 7 days on days #1, #8, #15 and #22 due to elevated LFTs, but nonetheless, her M spike dropped from 6.2 all the way down to 1.2.  She completed 35 cycles thus far.  She says she tolerated it reasonably well without any significant neuropathy, shortness of breath, chest pain, abdominal pain.  She recently lost her  to pancreatic cancer, but it is doing much better.  Otherwise, she is exercising on a regular basis.  She is walking.  She denies any shortness of breath, chest pain, abdominal pain, fevers, night sweats, or weight loss.      PHYSICAL EXAMINATION:   GENERAL:  She is an elderly white female in no acute distress.  ECOG performance status 0.   VITAL SIGNS:  Blood pressure 122/82, pulse 79, temperature 97.9.   HEENT:  Atraumatic, normocephalic.  Oropharynx nonerythematous.   NECK:  Supple.   LUNGS:  Clear to auscultation and percussion.   HEART:  Regular rhythm, S1, S2 normal.   ABDOMEN:  Soft, normoactive bowel sounds.  No mass, nontender.   LYMPHATICS:  No cervical, supraclavicular, axillary nodes.   EXTREMITIES:  Trace ankle edema.   NEUROLOGIC:   Nonfocal.      LABORATORY DATA:  Laboratories reveal CBC with white count 4.1, H and H 11.4/33.9, platelet count 181, ANC is 2.0, BUN 15, creatinine 0.67.  IgG level was 1995.  Kappa lambda ratio was 1.8.  Serum protein electrophoresis reveals an M-spike of 0.2 with monoclonal IgM globulin kappa light chain type.  BUN 15, creatinine 0.67.  LFTs are normal.      IMPRESSION:  IgG kappa multiple myeloma, revised IPSS staging:  stage II with 80% involvement of the bone marrow, elevated beta-2 microglobulin, elevated kappa lambda ratio was standard risk cytogenetics deemed a candidate for Velcade, Revlimid and Decadron.  She could not afford the Revlimid copay, started on Velcade and Decadron.  She refused stem cell transplant evaluation and consultation at the .  She received 2 cycles with positive response with drop in M spike, but subsequently developed a rise in M spike, rising kappa lambda ratio was initiated on the CyBorD regimen in 10/2019 and had a positive response after 1 cycle.  M spike dropped to 6.2 to 3.8 and after 35  cycles her M spike has dropped to 1.2.  Her IgG level continues to drop.  Her M spike remained relatively stable.  The plan is to proceed with cycle 36 of CyBorD regimen.  Continue to monitor M spike M spike does not drop for the next cycle, we may consider switching to daratumumab, he will see the patient in 4 months.  Subsequently, went on to obtain CBC, CMP, LDH, myeloma profile.  She will continue Zometa every 3 months.      Forty minutes was spent with the patient, greater than half the time was spent in counseling and coordinating of care.         DORIE GUNN MD             D: 10/27/2020   T: 10/28/2020   MT: VICENTE      Name:     WILLIAM WILDER   MRN:      [id number]        Account:      [id number]   :      1950           Visit Date:   10/27/2020      Document: [id number]       cc: Kenyatta Ley DO

## 2020-11-03 ENCOUNTER — INFUSION THERAPY VISIT (OUTPATIENT)
Dept: INFUSION THERAPY | Facility: OTHER | Age: 70
End: 2020-11-03
Attending: INTERNAL MEDICINE
Payer: MEDICARE

## 2020-11-03 VITALS
TEMPERATURE: 96 F | HEIGHT: 63 IN | WEIGHT: 168.65 LBS | OXYGEN SATURATION: 96 % | SYSTOLIC BLOOD PRESSURE: 120 MMHG | HEART RATE: 76 BPM | BODY MASS INDEX: 29.88 KG/M2 | DIASTOLIC BLOOD PRESSURE: 72 MMHG | RESPIRATION RATE: 16 BRPM

## 2020-11-03 DIAGNOSIS — C90.00 MULTIPLE MYELOMA NOT HAVING ACHIEVED REMISSION (H): Primary | ICD-10-CM

## 2020-11-03 LAB
BASOPHILS # BLD AUTO: 0 10E9/L (ref 0–0.2)
BASOPHILS NFR BLD AUTO: 0.4 %
DIFFERENTIAL METHOD BLD: ABNORMAL
EOSINOPHIL # BLD AUTO: 0.1 10E9/L (ref 0–0.7)
EOSINOPHIL NFR BLD AUTO: 1.9 %
ERYTHROCYTE [DISTWIDTH] IN BLOOD BY AUTOMATED COUNT: 14.6 % (ref 10–15)
HCT VFR BLD AUTO: 34.1 % (ref 35–47)
HGB BLD-MCNC: 11.7 G/DL (ref 11.7–15.7)
IMM GRANULOCYTES # BLD: 0.1 10E9/L (ref 0–0.4)
IMM GRANULOCYTES NFR BLD: 1.4 %
LYMPHOCYTES # BLD AUTO: 1.2 10E9/L (ref 0.8–5.3)
LYMPHOCYTES NFR BLD AUTO: 23.2 %
MCH RBC QN AUTO: 32.1 PG (ref 26.5–33)
MCHC RBC AUTO-ENTMCNC: 34.3 G/DL (ref 31.5–36.5)
MCV RBC AUTO: 94 FL (ref 78–100)
MONOCYTES # BLD AUTO: 0.9 10E9/L (ref 0–1.3)
MONOCYTES NFR BLD AUTO: 17.7 %
NEUTROPHILS # BLD AUTO: 2.9 10E9/L (ref 1.6–8.3)
NEUTROPHILS NFR BLD AUTO: 55.4 %
NRBC # BLD AUTO: 0 10*3/UL
NRBC BLD AUTO-RTO: 0 /100
PLATELET # BLD AUTO: 198 10E9/L (ref 150–450)
RBC # BLD AUTO: 3.64 10E12/L (ref 3.8–5.2)
WBC # BLD AUTO: 5.1 10E9/L (ref 4–11)

## 2020-11-03 PROCEDURE — 250N000011 HC RX IP 250 OP 636: Performed by: INTERNAL MEDICINE

## 2020-11-03 PROCEDURE — 36415 COLL VENOUS BLD VENIPUNCTURE: CPT | Mod: ZL | Performed by: INTERNAL MEDICINE

## 2020-11-03 PROCEDURE — 85025 COMPLETE CBC W/AUTO DIFF WBC: CPT | Mod: ZL | Performed by: INTERNAL MEDICINE

## 2020-11-03 PROCEDURE — 96401 CHEMO ANTI-NEOPL SQ/IM: CPT

## 2020-11-03 RX ORDER — ONDANSETRON 4 MG/1
8 TABLET, FILM COATED ORAL ONCE
Status: COMPLETED | OUTPATIENT
Start: 2020-11-03 | End: 2020-11-03

## 2020-11-03 RX ADMIN — ONDANSETRON HYDROCHLORIDE 8 MG: 4 TABLET, FILM COATED ORAL at 14:17

## 2020-11-03 RX ADMIN — BORTEZOMIB 2.8 MG: 3.5 INJECTION, POWDER, LYOPHILIZED, FOR SOLUTION INTRAVENOUS; SUBCUTANEOUS at 14:18

## 2020-11-03 ASSESSMENT — MIFFLIN-ST. JEOR: SCORE: 1244.13

## 2020-11-03 ASSESSMENT — PAIN SCALES - GENERAL: PAINLEVEL: NO PAIN (0)

## 2020-11-03 NOTE — PROGRESS NOTES
Patient is a 70 year old female here today for injection of Velcade per order of . Patient meets parameters for today's infusion. Patient identified with two identifiers, order verified, and verbal consent for today's infusion obtained from patient. Written consent for treatment is on file and valid.    Recent lab values: WBC: 5.1, HGB: 11.7, PLT: 198  ANC: 2.9. Patient meets order parameters for today's treatment.  Patient denies questions or concerns regarding injection and/or medication(s) being administered.  Velcade injected SQ into right upper outer arm per protocol rotating sites.     Injection administered per protocol. Patient tolerated infusion without incident, no signs or symptoms of adverse reaction noted. Patient denies pain or discomfort.     Covered with a sterile bandage. Pt instructed to leave bandage intact for a minimum of one hour, and to call with questions or concerns. Copy of appointments, discharge instructions, and after visit summary (AVS) provided to patient. Patient states understanding, discharged ambulatory.

## 2020-11-10 ENCOUNTER — INFUSION THERAPY VISIT (OUTPATIENT)
Dept: INFUSION THERAPY | Facility: OTHER | Age: 70
End: 2020-11-10
Attending: INTERNAL MEDICINE
Payer: MEDICARE

## 2020-11-10 VITALS
DIASTOLIC BLOOD PRESSURE: 83 MMHG | SYSTOLIC BLOOD PRESSURE: 123 MMHG | BODY MASS INDEX: 29.45 KG/M2 | HEART RATE: 86 BPM | OXYGEN SATURATION: 96 % | TEMPERATURE: 96.9 F | RESPIRATION RATE: 16 BRPM | WEIGHT: 166.23 LBS | HEIGHT: 63 IN

## 2020-11-10 DIAGNOSIS — C90.00 MULTIPLE MYELOMA NOT HAVING ACHIEVED REMISSION (H): Primary | ICD-10-CM

## 2020-11-10 LAB
BASOPHILS # BLD AUTO: 0 10E9/L (ref 0–0.2)
BASOPHILS NFR BLD AUTO: 0.4 %
CALCIUM SERPL-MCNC: 9.1 MG/DL (ref 8.5–10.1)
CREAT SERPL-MCNC: 0.71 MG/DL (ref 0.52–1.04)
DIFFERENTIAL METHOD BLD: ABNORMAL
EOSINOPHIL # BLD AUTO: 0.1 10E9/L (ref 0–0.7)
EOSINOPHIL NFR BLD AUTO: 2.6 %
ERYTHROCYTE [DISTWIDTH] IN BLOOD BY AUTOMATED COUNT: 14.7 % (ref 10–15)
GFR SERPL CREATININE-BSD FRML MDRD: 85 ML/MIN/{1.73_M2}
HCT VFR BLD AUTO: 34.8 % (ref 35–47)
HGB BLD-MCNC: 11.9 G/DL (ref 11.7–15.7)
IMM GRANULOCYTES # BLD: 0.1 10E9/L (ref 0–0.4)
IMM GRANULOCYTES NFR BLD: 1.2 %
LYMPHOCYTES # BLD AUTO: 1.3 10E9/L (ref 0.8–5.3)
LYMPHOCYTES NFR BLD AUTO: 25.9 %
MCH RBC QN AUTO: 32 PG (ref 26.5–33)
MCHC RBC AUTO-ENTMCNC: 34.2 G/DL (ref 31.5–36.5)
MCV RBC AUTO: 94 FL (ref 78–100)
MONOCYTES # BLD AUTO: 0.9 10E9/L (ref 0–1.3)
MONOCYTES NFR BLD AUTO: 18.2 %
NEUTROPHILS # BLD AUTO: 2.6 10E9/L (ref 1.6–8.3)
NEUTROPHILS NFR BLD AUTO: 51.7 %
NRBC # BLD AUTO: 0 10*3/UL
NRBC BLD AUTO-RTO: 0 /100
PLATELET # BLD AUTO: 229 10E9/L (ref 150–450)
RBC # BLD AUTO: 3.72 10E12/L (ref 3.8–5.2)
WBC # BLD AUTO: 5.1 10E9/L (ref 4–11)

## 2020-11-10 PROCEDURE — 85025 COMPLETE CBC W/AUTO DIFF WBC: CPT | Mod: ZL | Performed by: INTERNAL MEDICINE

## 2020-11-10 PROCEDURE — 96365 THER/PROPH/DIAG IV INF INIT: CPT

## 2020-11-10 PROCEDURE — 82565 ASSAY OF CREATININE: CPT | Mod: ZL | Performed by: INTERNAL MEDICINE

## 2020-11-10 PROCEDURE — 36415 COLL VENOUS BLD VENIPUNCTURE: CPT | Mod: ZL | Performed by: INTERNAL MEDICINE

## 2020-11-10 PROCEDURE — 258N000003 HC RX IP 258 OP 636: Performed by: NURSE PRACTITIONER

## 2020-11-10 PROCEDURE — 250N000011 HC RX IP 250 OP 636: Performed by: INTERNAL MEDICINE

## 2020-11-10 PROCEDURE — 82310 ASSAY OF CALCIUM: CPT | Mod: ZL | Performed by: INTERNAL MEDICINE

## 2020-11-10 PROCEDURE — 250N000011 HC RX IP 250 OP 636: Mod: JW

## 2020-11-10 PROCEDURE — 96401 CHEMO ANTI-NEOPL SQ/IM: CPT | Mod: 59

## 2020-11-10 RX ORDER — ZOLEDRONIC ACID 0.04 MG/ML
4 INJECTION, SOLUTION INTRAVENOUS ONCE
Status: COMPLETED | OUTPATIENT
Start: 2020-11-10 | End: 2020-11-10

## 2020-11-10 RX ORDER — ONDANSETRON 4 MG/1
8 TABLET, FILM COATED ORAL ONCE
Status: COMPLETED | OUTPATIENT
Start: 2020-11-10 | End: 2020-11-10

## 2020-11-10 RX ORDER — ZOLEDRONIC ACID 0.04 MG/ML
4 INJECTION, SOLUTION INTRAVENOUS ONCE
Status: CANCELLED | OUTPATIENT
Start: 2020-11-10 | End: 2020-11-10

## 2020-11-10 RX ADMIN — BORTEZOMIB 2.8 MG: 3.5 INJECTION, POWDER, LYOPHILIZED, FOR SOLUTION INTRAVENOUS; SUBCUTANEOUS at 14:13

## 2020-11-10 RX ADMIN — SODIUM CHLORIDE 250 ML: 9 INJECTION, SOLUTION INTRAVENOUS at 14:11

## 2020-11-10 RX ADMIN — ZOLEDRONIC ACID 4 MG: 0.04 INJECTION, SOLUTION INTRAVENOUS at 14:11

## 2020-11-10 RX ADMIN — ONDANSETRON HYDROCHLORIDE 8 MG: 4 TABLET, FILM COATED ORAL at 14:03

## 2020-11-10 ASSESSMENT — MIFFLIN-ST. JEOR: SCORE: 1233

## 2020-11-10 NOTE — PROGRESS NOTES
Patient is a 70 year old female here today for injection of Velcade per order of . Patient meets parameters for today's infusion. Patient identified with two identifiers, order verified, and verbal consent for today's infusion obtained from patient. Written consent for treatment is on file and valid.    Today's lab values: WBC: 5.1, HGB: 11.9, PLT: 229  ANC: 2.6.    Patient meets order parameters for today's treatment.    Independent dose check with Julia Anderson RN prior to release of Velcade.     Patient denies questions or concerns regarding injection and/or medication(s) being administered.    1413 Velcade injected SQ into left upper outer arm at 45 degree anlge  per protocol rotating sites.     Injection administered per protocol. Patient tolerated infusion without incident, no signs or symptoms of adverse reaction noted. Patient denies pain or discomfort.     Covered with a sterile bandage. Pt instructed to leave bandage intact for a minimum of one hour, and to call with questions or concerns. Patient declines copy of appointments, discharge instructions, and after visit summary (AVS). Patient states understanding, discharged ambulatory.

## 2020-11-10 NOTE — PROGRESS NOTES
24 gauge angio cath inserted into RT arm.  Immediate blood return noted.  IV secured with sterile, transparent dressing and tape.  Patient tolerated well, denies pain or discomfort at this time.  Flushes easily without resistance, no signs or symptoms of infiltration or infection.  zero blood wasted and purple and green tubes blood removed for ordered labs. Flushed with 3mL normal saline to clear line. Patient denies questions or concerns regarding infusion and/or medication(s) being administered.

## 2020-11-10 NOTE — PATIENT INSTRUCTIONS
We will see you back as planned. If you have any questions or concerns, we can be reached Monday through Friday 8am - 430pm at [phone] ([facility] [place]). If you have concerns related to a potential reaction/side effect after hours/weekends/holiday's, please seek emergent medical care.

## 2020-11-10 NOTE — PROGRESS NOTES
Patient is a 70 year old female here today for infusion of zometa per order of Dr Walker. Patient identified with two identifiers, order verified, and verbal consent for today's infusion obtained from patient.      Today's lab values: Calcium: 9.1 Creatinine: 0.71    Patient meets order parameters for today's treatment.     Patient denies questions or concerns regarding infusion and/or medication(s) being administered.    1411 IV pump verified with Zometa dose, drug, and rate of administration.  Infusion administered per protocol.  Patient tolerated infusion well, no signs or symptoms of adverse reaction noted.  Patient denies pain nor discomfort.     IV removed, catheter intact.  Site clean, dry and intact.  No signs or symptoms of infiltration or infection.  Covered with a sterile bandage, slight pressure applied for 30 seconds.  Pt instructed to leave bandage intact for a minimum of one hour, and to call with questions or concerns.  Patient declines copy of appointments, discharge instructions, and after visit summary (AVS).  Patient states understanding, discharged.

## 2020-11-17 ENCOUNTER — INFUSION THERAPY VISIT (OUTPATIENT)
Dept: INFUSION THERAPY | Facility: OTHER | Age: 70
End: 2020-11-17
Attending: INTERNAL MEDICINE
Payer: MEDICARE

## 2020-11-17 VITALS
BODY MASS INDEX: 29.34 KG/M2 | OXYGEN SATURATION: 95 % | SYSTOLIC BLOOD PRESSURE: 104 MMHG | HEIGHT: 63 IN | HEART RATE: 80 BPM | TEMPERATURE: 97.3 F | DIASTOLIC BLOOD PRESSURE: 66 MMHG | WEIGHT: 165.57 LBS

## 2020-11-17 DIAGNOSIS — C90.00 MULTIPLE MYELOMA NOT HAVING ACHIEVED REMISSION (H): Primary | ICD-10-CM

## 2020-11-17 LAB
BASOPHILS # BLD AUTO: 0 10E9/L (ref 0–0.2)
BASOPHILS NFR BLD AUTO: 0.5 %
DIFFERENTIAL METHOD BLD: ABNORMAL
EOSINOPHIL # BLD AUTO: 0.2 10E9/L (ref 0–0.7)
EOSINOPHIL NFR BLD AUTO: 3 %
ERYTHROCYTE [DISTWIDTH] IN BLOOD BY AUTOMATED COUNT: 14.4 % (ref 10–15)
HCT VFR BLD AUTO: 35.5 % (ref 35–47)
HGB BLD-MCNC: 12.3 G/DL (ref 11.7–15.7)
IMM GRANULOCYTES # BLD: 0.1 10E9/L (ref 0–0.4)
IMM GRANULOCYTES NFR BLD: 1.7 %
LYMPHOCYTES # BLD AUTO: 1.2 10E9/L (ref 0.8–5.3)
LYMPHOCYTES NFR BLD AUTO: 20.8 %
MCH RBC QN AUTO: 32.5 PG (ref 26.5–33)
MCHC RBC AUTO-ENTMCNC: 34.6 G/DL (ref 31.5–36.5)
MCV RBC AUTO: 94 FL (ref 78–100)
MONOCYTES # BLD AUTO: 0.9 10E9/L (ref 0–1.3)
MONOCYTES NFR BLD AUTO: 15.4 %
NEUTROPHILS # BLD AUTO: 3.5 10E9/L (ref 1.6–8.3)
NEUTROPHILS NFR BLD AUTO: 58.6 %
NRBC # BLD AUTO: 0 10*3/UL
NRBC BLD AUTO-RTO: 0 /100
PLATELET # BLD AUTO: 226 10E9/L (ref 150–450)
RBC # BLD AUTO: 3.79 10E12/L (ref 3.8–5.2)
WBC # BLD AUTO: 6 10E9/L (ref 4–11)

## 2020-11-17 PROCEDURE — 82784 ASSAY IGA/IGD/IGG/IGM EACH: CPT | Mod: ZL | Performed by: INTERNAL MEDICINE

## 2020-11-17 PROCEDURE — 36415 COLL VENOUS BLD VENIPUNCTURE: CPT | Mod: ZL | Performed by: INTERNAL MEDICINE

## 2020-11-17 PROCEDURE — 84165 PROTEIN E-PHORESIS SERUM: CPT | Mod: 26 | Performed by: PATHOLOGY

## 2020-11-17 PROCEDURE — 250N000011 HC RX IP 250 OP 636: Performed by: INTERNAL MEDICINE

## 2020-11-17 PROCEDURE — 96401 CHEMO ANTI-NEOPL SQ/IM: CPT

## 2020-11-17 PROCEDURE — 999N001036 HC STATISTIC TOTAL PROTEIN: Mod: ZL | Performed by: INTERNAL MEDICINE

## 2020-11-17 PROCEDURE — 83883 ASSAY NEPHELOMETRY NOT SPEC: CPT | Mod: ZL | Performed by: INTERNAL MEDICINE

## 2020-11-17 PROCEDURE — 85025 COMPLETE CBC W/AUTO DIFF WBC: CPT | Mod: ZL | Performed by: INTERNAL MEDICINE

## 2020-11-17 PROCEDURE — 82232 ASSAY OF BETA-2 PROTEIN: CPT | Mod: ZL | Performed by: INTERNAL MEDICINE

## 2020-11-17 PROCEDURE — 85810 BLOOD VISCOSITY EXAMINATION: CPT | Mod: ZL | Performed by: INTERNAL MEDICINE

## 2020-11-17 PROCEDURE — 84165 PROTEIN E-PHORESIS SERUM: CPT | Mod: TC,ZL | Performed by: INTERNAL MEDICINE

## 2020-11-17 RX ORDER — ONDANSETRON 4 MG/1
8 TABLET, FILM COATED ORAL ONCE
Status: COMPLETED | OUTPATIENT
Start: 2020-11-17 | End: 2020-11-17

## 2020-11-17 RX ADMIN — BORTEZOMIB 2.8 MG: 3.5 INJECTION, POWDER, LYOPHILIZED, FOR SOLUTION INTRAVENOUS; SUBCUTANEOUS at 15:01

## 2020-11-17 RX ADMIN — ONDANSETRON HYDROCHLORIDE 8 MG: 4 TABLET, FILM COATED ORAL at 15:00

## 2020-11-17 ASSESSMENT — MIFFLIN-ST. JEOR: SCORE: 1230

## 2020-11-17 NOTE — PROGRESS NOTES
Peripheral labs ordered. Butterfly needle inserted into Rt arm.  Immediate blood return noted. 5 gold and 1purple lab tubes drawn. Needle removed, covered with sterile guaze and coban. Patient tolerated well, denies pain or discomfort at this time.

## 2020-11-17 NOTE — PATIENT INSTRUCTIONS
We will see you back as planned. If you have any questions or concerns, we can be reached Monday through Friday 8am - 430pm at [phone] ([place]). If you have concerns related to a potential reaction/side effect after hours/weekends/holiday's, please seek emergent medical care.

## 2020-11-17 NOTE — PROGRESS NOTES
Patient is a 70 year old  her today for injection of Velcade per order of . Patient identified with two identifiers, order verified, and verbal consent for today's infusion obtained from patient. Written consent for treatment is on file and valid.    Recent lab values: WBC: 6.0HGB: 12.3, PLT: 226  ANC: 3.5.   Patient meets order parameters for today's treatment.    Velcade dose verified with Barron RICH RN, prior to release of drug.      Patient denies questions or concerns regarding injection and/or medication(s) being administered.    Velcade injected SQ into Right back arm at 45 degree angle per protocol rotating sites. Patient tolerated injection without incident, no signs or symptoms of adverse reaction noted. Patient denies pain or discomfort.     Covered with a sterile bandage. Pt instructed to leave bandage intact for a minimum of one hour, and to call with questions or concerns. Copy of appointments, discharge instructions, and after visit summary (AVS) provided to patient. Patient states understanding, discharged.

## 2020-11-19 LAB
ALBUMIN SERPL ELPH-MCNC: 4 G/DL (ref 3.7–5.1)
ALPHA1 GLOB SERPL ELPH-MCNC: 0.3 G/DL (ref 0.2–0.4)
ALPHA2 GLOB SERPL ELPH-MCNC: 0.8 G/DL (ref 0.5–0.9)
B-GLOBULIN SERPL ELPH-MCNC: 0.6 G/DL (ref 0.6–1)
B2 MICROGLOB SERPL-MCNC: 2.3 MG/L
GAMMA GLOB SERPL ELPH-MCNC: 1.6 G/DL (ref 0.7–1.6)
IGA SERPL-MCNC: 11 MG/DL (ref 84–499)
IGG SERPL-MCNC: 1832 MG/DL (ref 610–1616)
IGM SERPL-MCNC: <10 MG/DL (ref 35–242)
KAPPA LC UR-MCNC: 0.64 MG/DL (ref 0.33–1.94)
KAPPA LC/LAMBDA SER: 2.13 {RATIO} (ref 0.26–1.65)
LAMBDA LC SERPL-MCNC: 0.3 MG/DL (ref 0.57–2.63)
M PROTEIN SERPL ELPH-MCNC: 1.2 G/DL
PROT PATTERN SERPL ELPH-IMP: ABNORMAL
VISC SER: 1.7 CP (ref 1.4–1.8)

## 2020-11-24 ENCOUNTER — ONCOLOGY VISIT (OUTPATIENT)
Dept: ONCOLOGY | Facility: OTHER | Age: 70
End: 2020-11-24
Attending: INTERNAL MEDICINE
Payer: COMMERCIAL

## 2020-11-24 ENCOUNTER — DOCUMENTATION ONLY (OUTPATIENT)
Dept: INFUSION THERAPY | Facility: OTHER | Age: 70
End: 2020-11-24

## 2020-11-24 VITALS
WEIGHT: 164.68 LBS | HEIGHT: 63 IN | DIASTOLIC BLOOD PRESSURE: 78 MMHG | BODY MASS INDEX: 29.18 KG/M2 | OXYGEN SATURATION: 97 % | RESPIRATION RATE: 15 BRPM | SYSTOLIC BLOOD PRESSURE: 106 MMHG | TEMPERATURE: 99 F | HEART RATE: 86 BPM

## 2020-11-24 DIAGNOSIS — C90.00 MULTIPLE MYELOMA NOT HAVING ACHIEVED REMISSION (H): Primary | ICD-10-CM

## 2020-11-24 PROCEDURE — 99213 OFFICE O/P EST LOW 20 MIN: CPT | Performed by: INTERNAL MEDICINE

## 2020-11-24 PROCEDURE — G0463 HOSPITAL OUTPT CLINIC VISIT: HCPCS

## 2020-11-24 RX ORDER — EPINEPHRINE 1 MG/ML
0.3 INJECTION, SOLUTION, CONCENTRATE INTRAVENOUS EVERY 5 MIN PRN
Status: CANCELLED | OUTPATIENT
Start: 2020-12-22

## 2020-11-24 RX ORDER — DEXAMETHASONE 4 MG/1
12 TABLET ORAL ONCE
Status: CANCELLED | OUTPATIENT
Start: 2020-12-15

## 2020-11-24 RX ORDER — MONTELUKAST SODIUM 10 MG/1
10 TABLET ORAL ONCE
Status: CANCELLED | OUTPATIENT
Start: 2020-12-16

## 2020-11-24 RX ORDER — METHYLPREDNISOLONE SODIUM SUCCINATE 125 MG/2ML
125 INJECTION, POWDER, LYOPHILIZED, FOR SOLUTION INTRAMUSCULAR; INTRAVENOUS
Status: CANCELLED
Start: 2020-12-01

## 2020-11-24 RX ORDER — ALBUTEROL SULFATE 0.83 MG/ML
2.5 SOLUTION RESPIRATORY (INHALATION)
Status: CANCELLED | OUTPATIENT
Start: 2020-12-01

## 2020-11-24 RX ORDER — ACETAMINOPHEN 325 MG/1
650 TABLET ORAL ONCE
Status: CANCELLED | OUTPATIENT
Start: 2020-12-22

## 2020-11-24 RX ORDER — NALOXONE HYDROCHLORIDE 0.4 MG/ML
.1-.4 INJECTION, SOLUTION INTRAMUSCULAR; INTRAVENOUS; SUBCUTANEOUS
Status: CANCELLED | OUTPATIENT
Start: 2020-12-08

## 2020-11-24 RX ORDER — LORAZEPAM 2 MG/ML
0.5 INJECTION INTRAMUSCULAR EVERY 4 HOURS PRN
Status: CANCELLED
Start: 2020-12-01

## 2020-11-24 RX ORDER — SODIUM CHLORIDE 9 MG/ML
1000 INJECTION, SOLUTION INTRAVENOUS CONTINUOUS PRN
Status: CANCELLED
Start: 2020-12-08

## 2020-11-24 RX ORDER — ALBUTEROL SULFATE 0.83 MG/ML
2.5 SOLUTION RESPIRATORY (INHALATION)
Status: CANCELLED | OUTPATIENT
Start: 2020-12-15

## 2020-11-24 RX ORDER — METHYLPREDNISOLONE SODIUM SUCCINATE 125 MG/2ML
125 INJECTION, POWDER, LYOPHILIZED, FOR SOLUTION INTRAMUSCULAR; INTRAVENOUS
Status: CANCELLED
Start: 2020-12-08

## 2020-11-24 RX ORDER — HEPARIN SODIUM,PORCINE 10 UNIT/ML
5 VIAL (ML) INTRAVENOUS
Status: CANCELLED | OUTPATIENT
Start: 2020-12-15

## 2020-11-24 RX ORDER — ALBUTEROL SULFATE 90 UG/1
1-2 AEROSOL, METERED RESPIRATORY (INHALATION)
Status: CANCELLED
Start: 2020-12-15

## 2020-11-24 RX ORDER — DEXAMETHASONE 4 MG/1
12 TABLET ORAL ONCE
Status: CANCELLED | OUTPATIENT
Start: 2020-12-22

## 2020-11-24 RX ORDER — HEPARIN SODIUM,PORCINE 10 UNIT/ML
5 VIAL (ML) INTRAVENOUS
Status: CANCELLED | OUTPATIENT
Start: 2020-12-08

## 2020-11-24 RX ORDER — EPINEPHRINE 1 MG/ML
0.3 INJECTION, SOLUTION, CONCENTRATE INTRAVENOUS EVERY 5 MIN PRN
Status: CANCELLED | OUTPATIENT
Start: 2020-12-15

## 2020-11-24 RX ORDER — ALBUTEROL SULFATE 0.83 MG/ML
2.5 SOLUTION RESPIRATORY (INHALATION)
Status: CANCELLED | OUTPATIENT
Start: 2020-12-08

## 2020-11-24 RX ORDER — SODIUM CHLORIDE 9 MG/ML
1000 INJECTION, SOLUTION INTRAVENOUS CONTINUOUS PRN
Status: CANCELLED
Start: 2020-12-22

## 2020-11-24 RX ORDER — DIPHENHYDRAMINE HYDROCHLORIDE 50 MG/ML
50 INJECTION INTRAMUSCULAR; INTRAVENOUS
Status: CANCELLED
Start: 2020-12-22

## 2020-11-24 RX ORDER — LORAZEPAM 2 MG/ML
0.5 INJECTION INTRAMUSCULAR EVERY 4 HOURS PRN
Status: CANCELLED
Start: 2020-12-15

## 2020-11-24 RX ORDER — DIPHENHYDRAMINE HCL 50 MG
50 CAPSULE ORAL ONCE
Status: CANCELLED | OUTPATIENT
Start: 2020-12-22

## 2020-11-24 RX ORDER — DIPHENHYDRAMINE HCL 50 MG
50 CAPSULE ORAL ONCE
Status: CANCELLED | OUTPATIENT
Start: 2020-12-08

## 2020-11-24 RX ORDER — DEXAMETHASONE 4 MG/1
20 TABLET ORAL ONCE
Status: CANCELLED | OUTPATIENT
Start: 2020-12-08

## 2020-11-24 RX ORDER — HEPARIN SODIUM (PORCINE) LOCK FLUSH IV SOLN 100 UNIT/ML 100 UNIT/ML
5 SOLUTION INTRAVENOUS
Status: CANCELLED | OUTPATIENT
Start: 2020-12-01

## 2020-11-24 RX ORDER — ALBUTEROL SULFATE 0.83 MG/ML
2.5 SOLUTION RESPIRATORY (INHALATION)
Status: CANCELLED | OUTPATIENT
Start: 2020-12-22

## 2020-11-24 RX ORDER — LORAZEPAM 2 MG/ML
0.5 INJECTION INTRAMUSCULAR EVERY 4 HOURS PRN
Status: CANCELLED
Start: 2020-12-08

## 2020-11-24 RX ORDER — ALBUTEROL SULFATE 90 UG/1
1-2 AEROSOL, METERED RESPIRATORY (INHALATION)
Status: CANCELLED
Start: 2020-12-22

## 2020-11-24 RX ORDER — NALOXONE HYDROCHLORIDE 0.4 MG/ML
.1-.4 INJECTION, SOLUTION INTRAMUSCULAR; INTRAVENOUS; SUBCUTANEOUS
Status: CANCELLED | OUTPATIENT
Start: 2020-12-01

## 2020-11-24 RX ORDER — DEXAMETHASONE 4 MG/1
20 TABLET ORAL ONCE
Status: CANCELLED | OUTPATIENT
Start: 2020-12-01

## 2020-11-24 RX ORDER — ALBUTEROL SULFATE 90 UG/1
1-2 AEROSOL, METERED RESPIRATORY (INHALATION)
Status: CANCELLED
Start: 2020-12-01

## 2020-11-24 RX ORDER — ALBUTEROL SULFATE 90 UG/1
1-2 AEROSOL, METERED RESPIRATORY (INHALATION)
Status: CANCELLED
Start: 2020-12-08

## 2020-11-24 RX ORDER — NALOXONE HYDROCHLORIDE 0.4 MG/ML
.1-.4 INJECTION, SOLUTION INTRAMUSCULAR; INTRAVENOUS; SUBCUTANEOUS
Status: CANCELLED | OUTPATIENT
Start: 2020-12-15

## 2020-11-24 RX ORDER — SODIUM CHLORIDE 9 MG/ML
1000 INJECTION, SOLUTION INTRAVENOUS CONTINUOUS PRN
Status: CANCELLED
Start: 2020-12-15

## 2020-11-24 RX ORDER — MEPERIDINE HYDROCHLORIDE 25 MG/ML
25 INJECTION INTRAMUSCULAR; INTRAVENOUS; SUBCUTANEOUS EVERY 30 MIN PRN
Status: CANCELLED | OUTPATIENT
Start: 2020-12-01

## 2020-11-24 RX ORDER — HEPARIN SODIUM (PORCINE) LOCK FLUSH IV SOLN 100 UNIT/ML 100 UNIT/ML
5 SOLUTION INTRAVENOUS
Status: CANCELLED | OUTPATIENT
Start: 2020-12-08

## 2020-11-24 RX ORDER — DIPHENHYDRAMINE HYDROCHLORIDE 50 MG/ML
50 INJECTION INTRAMUSCULAR; INTRAVENOUS
Status: CANCELLED
Start: 2020-12-08

## 2020-11-24 RX ORDER — ACETAMINOPHEN 325 MG/1
650 TABLET ORAL ONCE
Status: CANCELLED | OUTPATIENT
Start: 2020-12-08

## 2020-11-24 RX ORDER — DEXAMETHASONE 4 MG/1
4 TABLET ORAL
Qty: 8 TABLET | Refills: 4 | Status: SHIPPED | OUTPATIENT
Start: 2020-12-27 | End: 2021-03-22

## 2020-11-24 RX ORDER — ACETAMINOPHEN 325 MG/1
650 TABLET ORAL ONCE
Status: CANCELLED | OUTPATIENT
Start: 2020-12-15

## 2020-11-24 RX ORDER — LORAZEPAM 2 MG/ML
0.5 INJECTION INTRAMUSCULAR EVERY 4 HOURS PRN
Status: CANCELLED
Start: 2020-12-22

## 2020-11-24 RX ORDER — MEPERIDINE HYDROCHLORIDE 25 MG/ML
25 INJECTION INTRAMUSCULAR; INTRAVENOUS; SUBCUTANEOUS EVERY 30 MIN PRN
Status: CANCELLED | OUTPATIENT
Start: 2020-12-08

## 2020-11-24 RX ORDER — HEPARIN SODIUM (PORCINE) LOCK FLUSH IV SOLN 100 UNIT/ML 100 UNIT/ML
5 SOLUTION INTRAVENOUS
Status: CANCELLED | OUTPATIENT
Start: 2020-12-22

## 2020-11-24 RX ORDER — DIPHENHYDRAMINE HCL 50 MG
50 CAPSULE ORAL ONCE
Status: CANCELLED | OUTPATIENT
Start: 2020-12-15

## 2020-11-24 RX ORDER — HEPARIN SODIUM (PORCINE) LOCK FLUSH IV SOLN 100 UNIT/ML 100 UNIT/ML
5 SOLUTION INTRAVENOUS
Status: CANCELLED | OUTPATIENT
Start: 2020-12-15

## 2020-11-24 RX ORDER — EPINEPHRINE 1 MG/ML
0.3 INJECTION, SOLUTION, CONCENTRATE INTRAVENOUS EVERY 5 MIN PRN
Status: CANCELLED | OUTPATIENT
Start: 2020-12-08

## 2020-11-24 RX ORDER — MONTELUKAST SODIUM 10 MG/1
10 TABLET ORAL ONCE
Status: CANCELLED | OUTPATIENT
Start: 2020-12-08

## 2020-11-24 RX ORDER — MONTELUKAST SODIUM 10 MG/1
10 TABLET ORAL ONCE
Status: CANCELLED | OUTPATIENT
Start: 2020-12-01

## 2020-11-24 RX ORDER — ACETAMINOPHEN 325 MG/1
650 TABLET ORAL ONCE
Status: CANCELLED | OUTPATIENT
Start: 2020-12-01

## 2020-11-24 RX ORDER — DIPHENHYDRAMINE HYDROCHLORIDE 50 MG/ML
50 INJECTION INTRAMUSCULAR; INTRAVENOUS
Status: CANCELLED
Start: 2020-12-15

## 2020-11-24 RX ORDER — HEPARIN SODIUM,PORCINE 10 UNIT/ML
5 VIAL (ML) INTRAVENOUS
Status: CANCELLED | OUTPATIENT
Start: 2020-12-22

## 2020-11-24 RX ORDER — NALOXONE HYDROCHLORIDE 0.4 MG/ML
.1-.4 INJECTION, SOLUTION INTRAMUSCULAR; INTRAVENOUS; SUBCUTANEOUS
Status: CANCELLED | OUTPATIENT
Start: 2020-12-22

## 2020-11-24 RX ORDER — DIPHENHYDRAMINE HYDROCHLORIDE 50 MG/ML
50 INJECTION INTRAMUSCULAR; INTRAVENOUS
Status: CANCELLED
Start: 2020-12-01

## 2020-11-24 RX ORDER — HEPARIN SODIUM,PORCINE 10 UNIT/ML
5 VIAL (ML) INTRAVENOUS
Status: CANCELLED | OUTPATIENT
Start: 2020-12-01

## 2020-11-24 RX ORDER — EPINEPHRINE 1 MG/ML
0.3 INJECTION, SOLUTION, CONCENTRATE INTRAVENOUS EVERY 5 MIN PRN
Status: CANCELLED | OUTPATIENT
Start: 2020-12-01

## 2020-11-24 RX ORDER — METHYLPREDNISOLONE SODIUM SUCCINATE 125 MG/2ML
125 INJECTION, POWDER, LYOPHILIZED, FOR SOLUTION INTRAMUSCULAR; INTRAVENOUS
Status: CANCELLED
Start: 2020-12-22

## 2020-11-24 RX ORDER — MEPERIDINE HYDROCHLORIDE 25 MG/ML
25 INJECTION INTRAMUSCULAR; INTRAVENOUS; SUBCUTANEOUS EVERY 30 MIN PRN
Status: CANCELLED | OUTPATIENT
Start: 2020-12-22

## 2020-11-24 RX ORDER — MEPERIDINE HYDROCHLORIDE 25 MG/ML
25 INJECTION INTRAMUSCULAR; INTRAVENOUS; SUBCUTANEOUS EVERY 30 MIN PRN
Status: CANCELLED | OUTPATIENT
Start: 2020-12-15

## 2020-11-24 RX ORDER — METHYLPREDNISOLONE SODIUM SUCCINATE 125 MG/2ML
125 INJECTION, POWDER, LYOPHILIZED, FOR SOLUTION INTRAMUSCULAR; INTRAVENOUS
Status: CANCELLED
Start: 2020-12-15

## 2020-11-24 RX ORDER — ACYCLOVIR 400 MG/1
400 TABLET ORAL 2 TIMES DAILY
Qty: 60 TABLET | Refills: 11 | Status: SHIPPED | OUTPATIENT
Start: 2020-12-27 | End: 2021-02-11

## 2020-11-24 RX ORDER — DIPHENHYDRAMINE HCL 50 MG
50 CAPSULE ORAL ONCE
Status: CANCELLED | OUTPATIENT
Start: 2020-12-01

## 2020-11-24 RX ORDER — SODIUM CHLORIDE 9 MG/ML
1000 INJECTION, SOLUTION INTRAVENOUS CONTINUOUS PRN
Status: CANCELLED
Start: 2020-12-01

## 2020-11-24 ASSESSMENT — MIFFLIN-ST. JEOR: SCORE: 1226

## 2020-11-24 ASSESSMENT — PAIN SCALES - GENERAL: PAINLEVEL: NO PAIN (0)

## 2020-11-24 NOTE — PROGRESS NOTES
[facility] Chemotherapy Consult    The inpatient pharmacist has been consulted to review the patient's chart for  the following: any significant drug interactions between home medications, new take home medications, pre-medications, PRN medications, chemotherapy agents, and chemotherapy regimen.     Current Outpatient Medications   Medication Sig Dispense Refill     [START ON 12/27/2020] acyclovir (ZOVIRAX) 400 MG tablet Take 1 tablet (400 mg) by mouth 2 times daily Start 1 week prior to daratumumab and continue for 3 months after treatment is complete. 60 tablet 11     acyclovir (ZOVIRAX) 400 MG tablet Take 1 tablet (400 mg) by mouth 2 times daily Viral Prophylaxis. 60 tablet 3     aspirin (ASA) 81 MG chewable tablet Take 81 mg by mouth daily       atorvastatin (LIPITOR) 10 MG tablet Take 1 tablet (10 mg) by mouth daily 90 tablet 3     Bortezomib (VELCADE IJ)        [START ON 12/27/2020] dexamethasone (DECADRON) 4 MG tablet Take 1 tablet (4 mg) by mouth daily (with breakfast) for 2 days following each daratumumab infusion 8 tablet 4     dexamethasone (DECADRON) 4 MG tablet TAKE 10 TABLETS BY MOUTH DAILY ON DAYS 1, 8, 15, AND 22 120 tablet 3     EPINEPHrine (EPIPEN) 0.3 MG/0.3ML injection Inject 0.3 mg into the muscle once as needed. Use as needed for anaphylaxis.       fluticasone (FLONASE) 50 MCG/ACT nasal spray SPRAY 2 SPRAYS INTO BOTH NOSTRILS DAILY 48 mL 0     losartan (COZAAR) 50 MG tablet Take 1 tablet (50 mg) by mouth daily 90 tablet 3     prochlorperazine (COMPAZINE) 10 MG tablet Take 1 tablet (10 mg) by mouth every 6 hours as needed (Nausea/Vomiting) (Patient not taking: Reported on 10/27/2020) 30 tablet 3     sertraline (ZOLOFT) 50 MG tablet Take 0.5 tablets (25 mg) by mouth daily 45 tablet 3       Take home medications: Decadron, Zovirax    Pre-Treats: Decadron, Benadryl, Tylenol, Singulair    PRN medications: Ativan, Pepcid    Chemotherapy agents: Darzalex  Faspro    Cancer Being Treated: multiple myeloma    The following interactions were found and will require patient education:     Drug-Drug interactions: Concurrent use of ASPIRIN and ZOLOFT may result in an increased risk of bleeding.     Drug-Allergy interactions: None    Drug-Food interactions: None    Drug-Ethanol interactions: Concurrent use of ETHANOL and ACETAMINOPHEN may result in an increased risk of hepatotoxicity.     Drug-Tobacco interactions: Concurrent use of TYLENOL and TOBACCO may result in decreased exposure of TYLENOL.       If there are any additional questions or concerns, please contact the inpatient pharmacy ([phone]) for further review.     Yonas High, Pharm D.  November 24, 2020

## 2020-11-24 NOTE — PROGRESS NOTES
Visit Date:   11/24/2020      HISTORY OF PRESENT ILLNESS:  Ms. Watts returns for followup of IgG multiple myeloma.  For details, see previous notes.  The patient has been initiated on the CyBorD regimen.  After she progressed on Velcade and Decadron the patient was started on CyBorD regimen with Velcade given at a dose of 1.5 mg/m2 weekly on days 1, 8, 15 and 22  as part of a 28-day regimen.  Cytoxan was given at a dose 140 mg/m2 weekly or 200 mg weekly on days 1, 8, 15 and 22.  She was receiving a reduced dose of Cytoxan based on her immunosuppressed state.  She also continued on Zovirax prophylaxis, dexamethasone given 40 mg weekly on days 1, 8, 15 and 22.  After 1 cycle, her M spike had dropped as well as her IgG level.  The patient will continue the CyBorD regimen, which completed 03 cycles Cytoxan.  Her dose of Cytoxan had to be reduced to 150 mg every 7 days on days 1, 8, 15 and 22 due to elevated LFTs.  Nonetheless, her M spike dropped from 6.2, all the way down to 1.2, has completed 35 cycles.  She now has completed her 36 cycles.  She is here for her 37th cycle.  She feels relatively well.  She has gained some weight.  She denies any fevers, night sweats, weight loss.  She has some mild neuropathy in her fingers and toes.      PHYSICAL EXAMINATION:   GENERAL:  She is an elderly white female in no acute distress.   VITAL SIGNS:  Blood pressure 106/78, pulse 86, respirations 15, temperature 99.   HEENT:  Atraumatic, normocephalic.  Oropharynx nonerythematous.   NECK:  Supple.   LUNGS:  Clear to auscultation and percussion.   HEART:  Regular rhythm, S1, S2 normal.   ABDOMEN:  Soft, normoactive bowel sounds.  No mass, nontender.   LYMPHATICS:  No cervical, supraclavicular nodes.   EXTREMITIES:  No edema.   NEUROLOGIC:  Nonfocal.      LABORATORY DATA:  Laboratories reveal CBC with white count 6.0, H and H 12.3 and 35.5, platelet count 226.  Kappa lambda ratio was 2.13.  IgG is 1832.  M spike is 1.2.       IMPRESSION:  IgG kappa multiple myeloma with a rise IPSS staging consistent with stage II with 80% involvement of the bone marrow, elevated beta-2 microglobulin, elevated kappa lambda ratio with standard risk cytogenetics deemed a candidate for Velcade, Revlimid and Decadron.  She cannot afford the Revlimid copay and started on Velcade and Decadron.  She refused stem cell transplant evaluation and consultation at the .  She received 2 cycles with positive response with drop in M spike, but subsequently has developed a rise in M spike.  Rising kappa lambda ratio was initiated on the CyBorD regimen in 10/2019 and had a positive response after 1 cycle and M-spike dropped from 6.2-3.8.  After 5 cycles, M spike had dropped to 1.2.  Since then and has remained stable and after 36 cycles it still remains 1.2 with a rising kappa lambda ratio.  Given the fact she is not continued to respond to the current regimen we would like to switch her to daratumumab.  We would recommend subcutaneous FASPRO, to be given on day 1, 8, 15 and 22 regimen.  We will continue with Zovirax prophylaxis.  She required Decadron 4 mg for 2 days following each daratumumab injection.  She will receive daratumumab at a dose of 1800 mg subcutaneous given on day 1, 8, 15 and 22 of the first 2 cycles and then with cycle 3 through 6 she will be given on day 1 and 15.  After cycle 7, will be given on day 1 of a 28-day cycle.  We will monitor myeloma labs with each cycle.  Otherwise, the patient with cycle 2, obtain CBC, CMP, LDH, and myeloma labs.      Forty minutes was spent with patient, greater than half the time was spent in counseling and coordination of care.         DORIE GUNN MD             D: 2020   T: 2020   MT: [id number]      Name:     WILLIAM WILDER   MRN:      [id number]        Account:      [id number]   :      1950           Visit Date:   2020      Document: [id number]       cc: Kenyatta RIDDLE

## 2020-11-24 NOTE — PATIENT INSTRUCTIONS
We would like to see you back in as per calendar. Please come 30minute(s) prior for lab work. When you are in need of a refill of your medications, please call your pharmacy and they will send us the request. If you have any questions please call [phone]

## 2020-11-24 NOTE — PROGRESS NOTES
"Chief Complaint   Patient presents with     RECHECK     multiple myeloma       Initial /78   Pulse 86   Temp 99  F (37.2  C) (Tympanic)   Resp 15   Ht 1.6 m (5' 2.99\")   Wt 74.7 kg (164 lb 10.9 oz)   SpO2 97%   BMI 29.18 kg/m   Estimated body mass index is 29.18 kg/m  as calculated from the following:    Height as of this encounter: 1.6 m (5' 2.99\").    Weight as of this encounter: 74.7 kg (164 lb 10.9 oz).  Medication Reconciliation: complete  Julia Anderson RN    "

## 2020-12-01 ENCOUNTER — INFUSION THERAPY VISIT (OUTPATIENT)
Dept: INFUSION THERAPY | Facility: OTHER | Age: 70
End: 2020-12-01
Attending: INTERNAL MEDICINE
Payer: MEDICARE

## 2020-12-01 VITALS
OXYGEN SATURATION: 98 % | TEMPERATURE: 98.4 F | DIASTOLIC BLOOD PRESSURE: 82 MMHG | WEIGHT: 166.45 LBS | HEART RATE: 84 BPM | HEIGHT: 63 IN | BODY MASS INDEX: 29.49 KG/M2 | SYSTOLIC BLOOD PRESSURE: 127 MMHG | RESPIRATION RATE: 20 BRPM

## 2020-12-01 DIAGNOSIS — C90.00 MULTIPLE MYELOMA NOT HAVING ACHIEVED REMISSION (H): Primary | ICD-10-CM

## 2020-12-01 LAB
ABO + RH BLD: NORMAL
ABO + RH BLD: NORMAL
BASOPHILS # BLD AUTO: 0 10E9/L (ref 0–0.2)
BASOPHILS NFR BLD AUTO: 0.6 %
BLD GP AB SCN SERPL QL: NORMAL
BLOOD BANK CMNT PATIENT-IMP: NORMAL
DIFFERENTIAL METHOD BLD: ABNORMAL
EOSINOPHIL # BLD AUTO: 0.2 10E9/L (ref 0–0.7)
EOSINOPHIL NFR BLD AUTO: 3.3 %
ERYTHROCYTE [DISTWIDTH] IN BLOOD BY AUTOMATED COUNT: 14.3 % (ref 10–15)
HCT VFR BLD AUTO: 31.3 % (ref 35–47)
HGB BLD-MCNC: 10.8 G/DL (ref 11.7–15.7)
IMM GRANULOCYTES # BLD: 0 10E9/L (ref 0–0.4)
IMM GRANULOCYTES NFR BLD: 0.8 %
LYMPHOCYTES # BLD AUTO: 1 10E9/L (ref 0.8–5.3)
LYMPHOCYTES NFR BLD AUTO: 20.6 %
MCH RBC QN AUTO: 32.8 PG (ref 26.5–33)
MCHC RBC AUTO-ENTMCNC: 34.5 G/DL (ref 31.5–36.5)
MCV RBC AUTO: 95 FL (ref 78–100)
MONOCYTES # BLD AUTO: 0.8 10E9/L (ref 0–1.3)
MONOCYTES NFR BLD AUTO: 16.5 %
NEUTROPHILS # BLD AUTO: 2.9 10E9/L (ref 1.6–8.3)
NEUTROPHILS NFR BLD AUTO: 58.2 %
NRBC # BLD AUTO: 0 10*3/UL
NRBC BLD AUTO-RTO: 0 /100
PLATELET # BLD AUTO: 243 10E9/L (ref 150–450)
RBC # BLD AUTO: 3.29 10E12/L (ref 3.8–5.2)
SPECIMEN EXP DATE BLD: NORMAL
WBC # BLD AUTO: 4.9 10E9/L (ref 4–11)

## 2020-12-01 PROCEDURE — 96367 TX/PROPH/DG ADDL SEQ IV INF: CPT

## 2020-12-01 PROCEDURE — 96365 THER/PROPH/DIAG IV INF INIT: CPT | Performed by: INTERNAL MEDICINE

## 2020-12-01 PROCEDURE — 86901 BLOOD TYPING SEROLOGIC RH(D): CPT | Mod: ZL | Performed by: INTERNAL MEDICINE

## 2020-12-01 PROCEDURE — 86850 RBC ANTIBODY SCREEN: CPT | Mod: ZL | Performed by: INTERNAL MEDICINE

## 2020-12-01 PROCEDURE — 258N000003 HC RX IP 258 OP 636: Performed by: INTERNAL MEDICINE

## 2020-12-01 PROCEDURE — 86900 BLOOD TYPING SEROLOGIC ABO: CPT | Mod: ZL | Performed by: INTERNAL MEDICINE

## 2020-12-01 PROCEDURE — 96372 THER/PROPH/DIAG INJ SC/IM: CPT | Performed by: INTERNAL MEDICINE

## 2020-12-01 PROCEDURE — 36415 COLL VENOUS BLD VENIPUNCTURE: CPT | Mod: ZL | Performed by: INTERNAL MEDICINE

## 2020-12-01 PROCEDURE — 250N000013 HC RX MED GY IP 250 OP 250 PS 637: Performed by: INTERNAL MEDICINE

## 2020-12-01 PROCEDURE — 85025 COMPLETE CBC W/AUTO DIFF WBC: CPT | Mod: ZL | Performed by: INTERNAL MEDICINE

## 2020-12-01 PROCEDURE — 250N000011 HC RX IP 250 OP 636: Performed by: INTERNAL MEDICINE

## 2020-12-01 PROCEDURE — 96401 CHEMO ANTI-NEOPL SQ/IM: CPT

## 2020-12-01 PROCEDURE — C9062 DARATUMUMAB HYALURONIDASE: HCPCS | Performed by: INTERNAL MEDICINE

## 2020-12-01 RX ORDER — ACETAMINOPHEN 325 MG/1
650 TABLET ORAL ONCE
Status: COMPLETED | OUTPATIENT
Start: 2020-12-01 | End: 2020-12-01

## 2020-12-01 RX ORDER — MONTELUKAST SODIUM 10 MG/1
10 TABLET ORAL ONCE
Status: COMPLETED | OUTPATIENT
Start: 2020-12-01 | End: 2020-12-01

## 2020-12-01 RX ADMIN — ACETAMINOPHEN 650 MG: 325 TABLET, FILM COATED ORAL at 12:42

## 2020-12-01 RX ADMIN — DEXAMETHASONE SODIUM PHOSPHATE 20 MG: 10 INJECTION INTRAMUSCULAR; INTRAVENOUS at 12:59

## 2020-12-01 RX ADMIN — DARATUMUMAB AND HYALURONIDASE-FIHJ (HUMAN RECOMBINANT) 1800 MG: 1800; 30000 INJECTION SUBCUTANEOUS at 14:03

## 2020-12-01 RX ADMIN — DIPHENHYDRAMINE HYDROCHLORIDE 50 MG: 50 INJECTION, SOLUTION INTRAMUSCULAR; INTRAVENOUS at 12:39

## 2020-12-01 RX ADMIN — MONTELUKAST 10 MG: 10 TABLET, FILM COATED ORAL at 12:41

## 2020-12-01 RX ADMIN — SODIUM CHLORIDE 1000 ML: 9 INJECTION, SOLUTION INTRAVENOUS at 12:38

## 2020-12-01 ASSESSMENT — MIFFLIN-ST. JEOR: SCORE: 1234.13

## 2020-12-01 ASSESSMENT — PAIN SCALES - GENERAL: PAINLEVEL: NO PAIN (0)

## 2020-12-01 NOTE — PROGRESS NOTES
Patient is a 70 year old female here today for injection of Faspro per order of . Patient meets parameters for today's infusion. Patient identified with two identifiers, order verified, and verbal consent for today's infusion obtained from patient. Written consent for treatment is on file and valid.    Recent lab values: WBC: 4.9, HGB: 10.8, PLT: 243  ANC: 2.9. Patient meets order parameters for today's treatment.  Patient denies questions or concerns regarding injection and/or medication(s) being administered.  Faspro injected SQ into right lower abdomen  per protocol rotating sites.     Injection administered per protocol. Patient tolerated infusion without incident, no signs or symptoms of adverse reaction noted. Patient denies pain or discomfort.     Covered with a sterile bandage. Pt instructed to leave bandage intact for a minimum of one hour, and to call with questions or concerns. Copy of appointments, discharge instructions, and after visit summary (AVS) provided to patient. Patient states understanding, discharged ambulatory.

## 2020-12-01 NOTE — PROGRESS NOTES
22 gauge angio cath inserted into RT arm.  Immediate blood return noted.  IV secured with sterile, transparent dressing and tape.  Patient tolerated well, denies pain or discomfort at this time.  Flushes easily without resistance, no signs or symptoms of infiltration or infection.   Pink  tube blood removed for ordered labs. Flushed with 3mL normal saline to clear line. Patient denies questions or concerns regarding infusion and/or medication(s) being administered.

## 2020-12-09 ENCOUNTER — TELEPHONE (OUTPATIENT)
Dept: INFUSION THERAPY | Facility: OTHER | Age: 70
End: 2020-12-09

## 2020-12-09 ENCOUNTER — INFUSION THERAPY VISIT (OUTPATIENT)
Dept: INFUSION THERAPY | Facility: OTHER | Age: 70
End: 2020-12-09
Attending: INTERNAL MEDICINE
Payer: MEDICARE

## 2020-12-09 VITALS
HEART RATE: 91 BPM | BODY MASS INDEX: 29.57 KG/M2 | RESPIRATION RATE: 18 BRPM | WEIGHT: 166.89 LBS | SYSTOLIC BLOOD PRESSURE: 134 MMHG | HEIGHT: 63 IN | DIASTOLIC BLOOD PRESSURE: 84 MMHG | TEMPERATURE: 99.2 F

## 2020-12-09 DIAGNOSIS — C90.00 MULTIPLE MYELOMA NOT HAVING ACHIEVED REMISSION (H): Primary | ICD-10-CM

## 2020-12-09 LAB
BASOPHILS # BLD AUTO: 0 10E9/L (ref 0–0.2)
BASOPHILS NFR BLD AUTO: 0.6 %
DIFFERENTIAL METHOD BLD: NORMAL
EOSINOPHIL # BLD AUTO: 0.3 10E9/L (ref 0–0.7)
EOSINOPHIL NFR BLD AUTO: 5.1 %
ERYTHROCYTE [DISTWIDTH] IN BLOOD BY AUTOMATED COUNT: 13.8 % (ref 10–15)
HCT VFR BLD AUTO: 37.6 % (ref 35–47)
HGB BLD-MCNC: 12.8 G/DL (ref 11.7–15.7)
IMM GRANULOCYTES # BLD: 0 10E9/L (ref 0–0.4)
IMM GRANULOCYTES NFR BLD: 0.4 %
LYMPHOCYTES # BLD AUTO: 1.1 10E9/L (ref 0.8–5.3)
LYMPHOCYTES NFR BLD AUTO: 22.2 %
MCH RBC QN AUTO: 32.2 PG (ref 26.5–33)
MCHC RBC AUTO-ENTMCNC: 34 G/DL (ref 31.5–36.5)
MCV RBC AUTO: 95 FL (ref 78–100)
MONOCYTES # BLD AUTO: 0.7 10E9/L (ref 0–1.3)
MONOCYTES NFR BLD AUTO: 14.2 %
NEUTROPHILS # BLD AUTO: 2.9 10E9/L (ref 1.6–8.3)
NEUTROPHILS NFR BLD AUTO: 57.5 %
NRBC # BLD AUTO: 0 10*3/UL
NRBC BLD AUTO-RTO: 0 /100
PLATELET # BLD AUTO: 245 10E9/L (ref 150–450)
RBC # BLD AUTO: 3.97 10E12/L (ref 3.8–5.2)
WBC # BLD AUTO: 5.1 10E9/L (ref 4–11)

## 2020-12-09 PROCEDURE — 250N000011 HC RX IP 250 OP 636: Performed by: INTERNAL MEDICINE

## 2020-12-09 PROCEDURE — 96365 THER/PROPH/DIAG IV INF INIT: CPT

## 2020-12-09 PROCEDURE — C9062 DARATUMUMAB HYALURONIDASE: HCPCS | Performed by: INTERNAL MEDICINE

## 2020-12-09 PROCEDURE — 96372 THER/PROPH/DIAG INJ SC/IM: CPT | Performed by: INTERNAL MEDICINE

## 2020-12-09 PROCEDURE — 36415 COLL VENOUS BLD VENIPUNCTURE: CPT | Mod: ZL | Performed by: INTERNAL MEDICINE

## 2020-12-09 PROCEDURE — 85025 COMPLETE CBC W/AUTO DIFF WBC: CPT | Mod: ZL | Performed by: INTERNAL MEDICINE

## 2020-12-09 PROCEDURE — 258N000003 HC RX IP 258 OP 636: Performed by: INTERNAL MEDICINE

## 2020-12-09 PROCEDURE — 96375 TX/PRO/DX INJ NEW DRUG ADDON: CPT

## 2020-12-09 PROCEDURE — 96401 CHEMO ANTI-NEOPL SQ/IM: CPT | Performed by: INTERNAL MEDICINE

## 2020-12-09 PROCEDURE — 250N000013 HC RX MED GY IP 250 OP 250 PS 637: Mod: GY | Performed by: INTERNAL MEDICINE

## 2020-12-09 RX ORDER — MONTELUKAST SODIUM 10 MG/1
10 TABLET ORAL ONCE
Status: COMPLETED | OUTPATIENT
Start: 2020-12-09 | End: 2020-12-09

## 2020-12-09 RX ORDER — ACETAMINOPHEN 325 MG/1
650 TABLET ORAL ONCE
Status: COMPLETED | OUTPATIENT
Start: 2020-12-09 | End: 2020-12-09

## 2020-12-09 RX ADMIN — MONTELUKAST 10 MG: 10 TABLET, FILM COATED ORAL at 13:37

## 2020-12-09 RX ADMIN — DIPHENHYDRAMINE HYDROCHLORIDE 50 MG: 50 INJECTION, SOLUTION INTRAMUSCULAR; INTRAVENOUS at 13:15

## 2020-12-09 RX ADMIN — DEXAMETHASONE SODIUM PHOSPHATE 20 MG: 10 INJECTION INTRAMUSCULAR; INTRAVENOUS at 13:33

## 2020-12-09 RX ADMIN — SODIUM CHLORIDE 250 ML: 9 INJECTION, SOLUTION INTRAVENOUS at 13:12

## 2020-12-09 RX ADMIN — ACETAMINOPHEN 650 MG: 325 TABLET, FILM COATED ORAL at 13:37

## 2020-12-09 RX ADMIN — DARATUMUMAB AND HYALURONIDASE-FIHJ (HUMAN RECOMBINANT) 1800 MG: 1800; 30000 INJECTION SUBCUTANEOUS at 14:36

## 2020-12-09 ASSESSMENT — PAIN SCALES - GENERAL: PAINLEVEL: NO PAIN (0)

## 2020-12-09 ASSESSMENT — MIFFLIN-ST. JEOR: SCORE: 1236

## 2020-12-09 NOTE — TELEPHONE ENCOUNTER
Patient has two different doses/orders for dex that are current in her chart. The first appears to be related to her old treatment (Velcade) and is dosed higher for days 1, 8, 15, and 22. The second, which is the newer dose, appears to be ordered for with her FASPRO, and is instructed to take 4mg in the am for 2 days after treatment. Patient thought she was still supposed to be taking the original dose. However after reviewing most recent [facility] notes, it appears the most recent and correct dose is the 4mg for 2 days after treatment. This is what I instructed her to do for now. Do want to ensure it is ok to discontinue the other order, prior to doing so. Please advise.

## 2020-12-09 NOTE — PROGRESS NOTES
Patient is a 70 year old female here today accompanied by self for injection of FASPRO per order of Dr Walker.     Today's within parameters to administer injection; today's lab values are WBC:  5.1, HGB 12.8, , ANC 2.9 .     Patient denies questions nor concerns regarding medication, administration site, side effects, nor aftercare.  Patient identified with two identifiers, order verified, and verbal consent for today's injection obtained from patient.   Patient education provided on s/s of injection site infection, and/or medication specific side effects, and when to call a provider.  Patient instructed to report any adverse effects.     FASPRO administered per protocol in left abd per orders at 45 degree angle.  Site covered with sterile bandage.  Patient tolerated injection well, no verbal nor non-verbal signs of discomfort noted.  No adverse effects noted at this time.     Patient instructed to call with further questions or concerns.  Patient states understanding and is in agreement with this plan.  Copy of appointments, and after visit summary (AVS) given to patient. Patient discharged.

## 2020-12-09 NOTE — PATIENT INSTRUCTIONS
We will see you back as planned. If you have any questions or concerns, we can be reached Monday through Friday 8am - 430pm at [phone] ([facility] [place]). If you have concerns related to a potential reaction/side effect after hours/weekends/holiday's, please seek emergent medical care.

## 2020-12-09 NOTE — PROGRESS NOTES
2 doses of Dexamethsone in chart, patient unsure of what dose she is supposed to be taking. It appears one dose was with Velcade/Cytoxan, and the 4mg for 2 days after Daratumumab is current dose. Dr Walker's notes confirm. But did send a message to [facility] to confirm ok to discontinue other dose. Instructed patient to take the 4mg tomorrow and the following am, and if there are any further instructions we will call.

## 2020-12-16 ENCOUNTER — INFUSION THERAPY VISIT (OUTPATIENT)
Dept: INFUSION THERAPY | Facility: OTHER | Age: 70
End: 2020-12-16
Attending: INTERNAL MEDICINE
Payer: MEDICARE

## 2020-12-16 VITALS
WEIGHT: 167.33 LBS | OXYGEN SATURATION: 93 % | BODY MASS INDEX: 29.65 KG/M2 | HEART RATE: 73 BPM | DIASTOLIC BLOOD PRESSURE: 69 MMHG | RESPIRATION RATE: 17 BRPM | SYSTOLIC BLOOD PRESSURE: 97 MMHG

## 2020-12-16 DIAGNOSIS — C90.00 MULTIPLE MYELOMA NOT HAVING ACHIEVED REMISSION (H): Primary | ICD-10-CM

## 2020-12-16 LAB
BASOPHILS # BLD AUTO: 0 10E9/L (ref 0–0.2)
BASOPHILS NFR BLD AUTO: 0.2 %
DIFFERENTIAL METHOD BLD: NORMAL
EOSINOPHIL # BLD AUTO: 0.2 10E9/L (ref 0–0.7)
EOSINOPHIL NFR BLD AUTO: 4.6 %
ERYTHROCYTE [DISTWIDTH] IN BLOOD BY AUTOMATED COUNT: 13.5 % (ref 10–15)
HCT VFR BLD AUTO: 39.1 % (ref 35–47)
HGB BLD-MCNC: 13.3 G/DL (ref 11.7–15.7)
IMM GRANULOCYTES # BLD: 0 10E9/L (ref 0–0.4)
IMM GRANULOCYTES NFR BLD: 0.6 %
LYMPHOCYTES # BLD AUTO: 1.2 10E9/L (ref 0.8–5.3)
LYMPHOCYTES NFR BLD AUTO: 22.5 %
MCH RBC QN AUTO: 32 PG (ref 26.5–33)
MCHC RBC AUTO-ENTMCNC: 34 G/DL (ref 31.5–36.5)
MCV RBC AUTO: 94 FL (ref 78–100)
MONOCYTES # BLD AUTO: 0.7 10E9/L (ref 0–1.3)
MONOCYTES NFR BLD AUTO: 12.9 %
NEUTROPHILS # BLD AUTO: 3.1 10E9/L (ref 1.6–8.3)
NEUTROPHILS NFR BLD AUTO: 59.2 %
NRBC # BLD AUTO: 0 10*3/UL
NRBC BLD AUTO-RTO: 0 /100
PLATELET # BLD AUTO: 205 10E9/L (ref 150–450)
RBC # BLD AUTO: 4.16 10E12/L (ref 3.8–5.2)
WBC # BLD AUTO: 5.2 10E9/L (ref 4–11)

## 2020-12-16 PROCEDURE — 96367 TX/PROPH/DG ADDL SEQ IV INF: CPT

## 2020-12-16 PROCEDURE — 250N000011 HC RX IP 250 OP 636: Performed by: INTERNAL MEDICINE

## 2020-12-16 PROCEDURE — 258N000003 HC RX IP 258 OP 636: Performed by: INTERNAL MEDICINE

## 2020-12-16 PROCEDURE — C9062 DARATUMUMAB HYALURONIDASE: HCPCS | Performed by: INTERNAL MEDICINE

## 2020-12-16 PROCEDURE — 250N000013 HC RX MED GY IP 250 OP 250 PS 637: Performed by: INTERNAL MEDICINE

## 2020-12-16 PROCEDURE — 96372 THER/PROPH/DIAG INJ SC/IM: CPT | Performed by: INTERNAL MEDICINE

## 2020-12-16 PROCEDURE — 96365 THER/PROPH/DIAG IV INF INIT: CPT

## 2020-12-16 PROCEDURE — 36415 COLL VENOUS BLD VENIPUNCTURE: CPT | Mod: ZL | Performed by: INTERNAL MEDICINE

## 2020-12-16 PROCEDURE — 96401 CHEMO ANTI-NEOPL SQ/IM: CPT

## 2020-12-16 PROCEDURE — 85025 COMPLETE CBC W/AUTO DIFF WBC: CPT | Mod: ZL | Performed by: INTERNAL MEDICINE

## 2020-12-16 RX ORDER — DIPHENHYDRAMINE HCL 50 MG
50 CAPSULE ORAL ONCE
Status: COMPLETED | OUTPATIENT
Start: 2020-12-16 | End: 2020-12-16

## 2020-12-16 RX ORDER — MONTELUKAST SODIUM 10 MG/1
10 TABLET ORAL ONCE
Status: COMPLETED | OUTPATIENT
Start: 2020-12-16 | End: 2020-12-16

## 2020-12-16 RX ORDER — ACETAMINOPHEN 325 MG/1
650 TABLET ORAL ONCE
Status: COMPLETED | OUTPATIENT
Start: 2020-12-16 | End: 2020-12-16

## 2020-12-16 RX ADMIN — DIPHENHYDRAMINE HYDROCHLORIDE 50 MG: 50 CAPSULE ORAL at 13:46

## 2020-12-16 RX ADMIN — DEXAMETHASONE SODIUM PHOSPHATE 12 MG: 10 INJECTION INTRAMUSCULAR; INTRAVENOUS at 13:40

## 2020-12-16 RX ADMIN — MONTELUKAST 10 MG: 10 TABLET, FILM COATED ORAL at 13:46

## 2020-12-16 RX ADMIN — SODIUM CHLORIDE 250 ML: 9 INJECTION, SOLUTION INTRAVENOUS at 13:39

## 2020-12-16 RX ADMIN — DARATUMUMAB AND HYALURONIDASE-FIHJ (HUMAN RECOMBINANT) 1800 MG: 1800; 30000 INJECTION SUBCUTANEOUS at 14:42

## 2020-12-16 RX ADMIN — ACETAMINOPHEN 650 MG: 325 TABLET, FILM COATED ORAL at 13:46

## 2020-12-16 ASSESSMENT — PAIN SCALES - GENERAL: PAINLEVEL: NO PAIN (0)

## 2020-12-16 NOTE — PROGRESS NOTES
24 gauge angio cath inserted into Rt arm.  Immediate blood return noted.  IV secured with sterile, transparent dressing and tape.  Patient tolerated well, denies pain or discomfort at this time.  Flushes easily without resistance, no signs or symptoms of infiltration or infection.  3 ml blood wasted and 1 purple tubes blood removed for ordered labs. Flushed with 3mL normal saline to clear line. Patient denies questions or concerns regarding infusion and/or medication(s) being administered.

## 2020-12-16 NOTE — PROGRESS NOTES
Patient is a 70 year old female  here accompanied by self today for infusion of Darzalex Faspro under the orders of Dr. Walker.     subcutaneous injection verified with Christine, CC  dose, drug, and rate of administration. Infusion administered per protocol. Patient tolerated infusion well, no signs or symptoms of adverse reaction noted. Patient denies pain nor discomfort.     Needle removed, tip intact. Site clean, dry and intact. Covered with a sterile bandage, slight pressure applied for 30 seconds. Pt instructed to leave bandage intact for a minimum of one hour, and to call with questions or concerns. Copy of appointments, discharge instructions, and after visit summary (AVS) provided to patient. Patient states understanding, discharged.

## 2020-12-17 ENCOUNTER — TELEPHONE (OUTPATIENT)
Dept: ONCOLOGY | Facility: OTHER | Age: 70
End: 2020-12-17

## 2020-12-17 NOTE — TELEPHONE ENCOUNTER
Patient called to ask if given her current treatment, she can still go to the dentist for a teeth cleaning, on the 22nd next week.  Routed to Oncology pool for nurse triage.

## 2020-12-18 ENCOUNTER — ANCILLARY PROCEDURE (OUTPATIENT)
Dept: MAMMOGRAPHY | Facility: OTHER | Age: 70
End: 2020-12-18
Attending: PHYSICIAN ASSISTANT
Payer: MEDICARE

## 2020-12-18 DIAGNOSIS — Z12.31 VISIT FOR SCREENING MAMMOGRAM: ICD-10-CM

## 2020-12-18 PROCEDURE — 77067 SCR MAMMO BI INCL CAD: CPT | Mod: TC

## 2020-12-23 ENCOUNTER — TELEPHONE (OUTPATIENT)
Dept: INFUSION THERAPY | Facility: OTHER | Age: 70
End: 2020-12-23

## 2020-12-23 ENCOUNTER — HOSPITAL ENCOUNTER (OUTPATIENT)
Dept: MAMMOGRAPHY | Facility: OTHER | Age: 70
End: 2020-12-23
Attending: PHYSICIAN ASSISTANT
Payer: MEDICARE

## 2020-12-23 ENCOUNTER — INFUSION THERAPY VISIT (OUTPATIENT)
Dept: INFUSION THERAPY | Facility: OTHER | Age: 70
End: 2020-12-23
Attending: INTERNAL MEDICINE
Payer: MEDICARE

## 2020-12-23 VITALS
BODY MASS INDEX: 29.77 KG/M2 | TEMPERATURE: 98.6 F | DIASTOLIC BLOOD PRESSURE: 40 MMHG | WEIGHT: 167.99 LBS | SYSTOLIC BLOOD PRESSURE: 96 MMHG | RESPIRATION RATE: 18 BRPM | OXYGEN SATURATION: 98 % | HEART RATE: 101 BPM

## 2020-12-23 DIAGNOSIS — R92.8 ABNORMALITY OF LEFT BREAST ON SCREENING MAMMOGRAPHY: ICD-10-CM

## 2020-12-23 DIAGNOSIS — C90.00 MULTIPLE MYELOMA NOT HAVING ACHIEVED REMISSION (H): Primary | ICD-10-CM

## 2020-12-23 LAB
BASOPHILS # BLD AUTO: 0 10E9/L (ref 0–0.2)
BASOPHILS NFR BLD AUTO: 0.3 %
DIFFERENTIAL METHOD BLD: NORMAL
EOSINOPHIL # BLD AUTO: 0.1 10E9/L (ref 0–0.7)
EOSINOPHIL NFR BLD AUTO: 2.2 %
ERYTHROCYTE [DISTWIDTH] IN BLOOD BY AUTOMATED COUNT: 13.9 % (ref 10–15)
HCT VFR BLD AUTO: 37.3 % (ref 35–47)
HGB BLD-MCNC: 12.7 G/DL (ref 11.7–15.7)
IMM GRANULOCYTES # BLD: 0 10E9/L (ref 0–0.4)
IMM GRANULOCYTES NFR BLD: 0.5 %
LYMPHOCYTES # BLD AUTO: 1.1 10E9/L (ref 0.8–5.3)
LYMPHOCYTES NFR BLD AUTO: 18.2 %
MCH RBC QN AUTO: 32.4 PG (ref 26.5–33)
MCHC RBC AUTO-ENTMCNC: 34 G/DL (ref 31.5–36.5)
MCV RBC AUTO: 95 FL (ref 78–100)
MONOCYTES # BLD AUTO: 0.6 10E9/L (ref 0–1.3)
MONOCYTES NFR BLD AUTO: 9.3 %
NEUTROPHILS # BLD AUTO: 4.1 10E9/L (ref 1.6–8.3)
NEUTROPHILS NFR BLD AUTO: 69.5 %
NRBC # BLD AUTO: 0 10*3/UL
NRBC BLD AUTO-RTO: 0 /100
PLATELET # BLD AUTO: 185 10E9/L (ref 150–450)
RBC # BLD AUTO: 3.92 10E12/L (ref 3.8–5.2)
WBC # BLD AUTO: 5.9 10E9/L (ref 4–11)

## 2020-12-23 PROCEDURE — 85025 COMPLETE CBC W/AUTO DIFF WBC: CPT | Mod: ZL | Performed by: INTERNAL MEDICINE

## 2020-12-23 PROCEDURE — 96401 CHEMO ANTI-NEOPL SQ/IM: CPT

## 2020-12-23 PROCEDURE — 86335 IMMUNFIX E-PHORSIS/URINE/CSF: CPT | Mod: TC,ZL | Performed by: INTERNAL MEDICINE

## 2020-12-23 PROCEDURE — 82784 ASSAY IGA/IGD/IGG/IGM EACH: CPT | Mod: ZL | Performed by: INTERNAL MEDICINE

## 2020-12-23 PROCEDURE — 250N000013 HC RX MED GY IP 250 OP 250 PS 637: Mod: GY | Performed by: INTERNAL MEDICINE

## 2020-12-23 PROCEDURE — 85810 BLOOD VISCOSITY EXAMINATION: CPT | Mod: ZL | Performed by: INTERNAL MEDICINE

## 2020-12-23 PROCEDURE — 250N000011 HC RX IP 250 OP 636: Performed by: INTERNAL MEDICINE

## 2020-12-23 PROCEDURE — 999N001036 HC STATISTIC TOTAL PROTEIN: Mod: ZL | Performed by: INTERNAL MEDICINE

## 2020-12-23 PROCEDURE — 36415 COLL VENOUS BLD VENIPUNCTURE: CPT | Mod: ZL | Performed by: INTERNAL MEDICINE

## 2020-12-23 PROCEDURE — 82232 ASSAY OF BETA-2 PROTEIN: CPT | Mod: ZL | Performed by: INTERNAL MEDICINE

## 2020-12-23 PROCEDURE — 84165 PROTEIN E-PHORESIS SERUM: CPT | Mod: TC,ZL | Performed by: INTERNAL MEDICINE

## 2020-12-23 PROCEDURE — 250N000012 HC RX MED GY IP 250 OP 636 PS 637: Performed by: INTERNAL MEDICINE

## 2020-12-23 PROCEDURE — 96372 THER/PROPH/DIAG INJ SC/IM: CPT | Performed by: INTERNAL MEDICINE

## 2020-12-23 PROCEDURE — G0279 TOMOSYNTHESIS, MAMMO: HCPCS | Mod: TC

## 2020-12-23 PROCEDURE — C9062 DARATUMUMAB HYALURONIDASE: HCPCS | Performed by: INTERNAL MEDICINE

## 2020-12-23 PROCEDURE — 86334 IMMUNOFIX E-PHORESIS SERUM: CPT | Mod: TC,ZL | Performed by: INTERNAL MEDICINE

## 2020-12-23 RX ORDER — DIPHENHYDRAMINE HCL 50 MG
50 CAPSULE ORAL ONCE
Status: COMPLETED | OUTPATIENT
Start: 2020-12-23 | End: 2020-12-23

## 2020-12-23 RX ORDER — DEXAMETHASONE 4 MG/1
12 TABLET ORAL ONCE
Status: COMPLETED | OUTPATIENT
Start: 2020-12-23 | End: 2020-12-23

## 2020-12-23 RX ORDER — ACETAMINOPHEN 325 MG/1
650 TABLET ORAL ONCE
Status: COMPLETED | OUTPATIENT
Start: 2020-12-23 | End: 2020-12-23

## 2020-12-23 RX ADMIN — DEXAMETHASONE 12 MG: 4 TABLET ORAL at 13:52

## 2020-12-23 RX ADMIN — DARATUMUMAB AND HYALURONIDASE-FIHJ (HUMAN RECOMBINANT) 1800 MG: 1800; 30000 INJECTION SUBCUTANEOUS at 14:55

## 2020-12-23 RX ADMIN — DIPHENHYDRAMINE HYDROCHLORIDE 50 MG: 50 CAPSULE ORAL at 13:46

## 2020-12-23 RX ADMIN — ACETAMINOPHEN 650 MG: 325 TABLET, FILM COATED ORAL at 13:46

## 2020-12-23 ASSESSMENT — PAIN SCALES - GENERAL: PAINLEVEL: NO PAIN (0)

## 2020-12-23 NOTE — PROGRESS NOTES
Patient is a 70 year old female here today accompanied by self for injection of FASPRO per order of Dr Walker.     Labs within parameters to administer injection; today's lab values are WBC:   5.9, ANC  4.1, PLT  185, HGB  12.7.    Patient denies questions nor concerns regarding medication, administration site, side effects, nor aftercare.  Patient identified with two identifiers, order verified, and verbal consent for today's injection obtained from patient.   Patient education provided on s/s of injection site infection, and/or medication specific side effects, and when to call a provider.  Patient instructed to report any adverse effects.     Medication administered per protocol in left abd, approx 3 inches from navel, at 45 degree angle.  Site covered with sterile bandage.  Patient tolerated injection well, no verbal nor non-verbal signs of discomfort noted.  No adverse effects noted at this time.     Patient instructed to call with further questions or concerns.  Patient states understanding and is in agreement with this plan.  Copy of appointments, and after visit summary (AVS) given to patient. Patient discharged.

## 2020-12-23 NOTE — TELEPHONE ENCOUNTER
Call to Bonita Quintana, as after released and administered pretreats orally, noted oral dex says she needs to wait a full hour prior to admin of faspro, but IV dex says 30-60min, as do the other two oral premeds (Benadryl and Tylenol). Discussed with Bonita that we have another patient who gets this medication, gets oral dex, and his says to wait 30min prior to faspro. She noted she did not know enough about the dex and faspro together to decide if ok to give at 30min for this patient. She asked that we wait full hour today, and send message to pharmacy to see if they can help clear up. Did so.

## 2020-12-23 NOTE — PATIENT INSTRUCTIONS
We will see you back as planned. If you have any questions or concerns, we can be reached Monday through Friday 8am - 430pm at [phone] ([facility] [place]). If you have concerns related to a potential reaction/side effect after hours/weekends/holiday's, please seek emergent medical care.

## 2020-12-24 LAB — PROT ELPH PNL UR ELPH: NORMAL

## 2020-12-26 RX ORDER — ACETAMINOPHEN 325 MG/1
650 TABLET ORAL ONCE
Status: CANCELLED | OUTPATIENT
Start: 2021-01-12

## 2020-12-26 RX ORDER — ALBUTEROL SULFATE 90 UG/1
1-2 AEROSOL, METERED RESPIRATORY (INHALATION)
Status: CANCELLED
Start: 2021-01-12

## 2020-12-26 RX ORDER — HEPARIN SODIUM (PORCINE) LOCK FLUSH IV SOLN 100 UNIT/ML 100 UNIT/ML
5 SOLUTION INTRAVENOUS
Status: CANCELLED | OUTPATIENT
Start: 2021-01-12

## 2020-12-26 RX ORDER — LORAZEPAM 2 MG/ML
0.5 INJECTION INTRAMUSCULAR EVERY 4 HOURS PRN
Status: CANCELLED
Start: 2021-01-12

## 2020-12-26 RX ORDER — ALBUTEROL SULFATE 90 UG/1
1-2 AEROSOL, METERED RESPIRATORY (INHALATION)
Status: CANCELLED
Start: 2021-01-05

## 2020-12-26 RX ORDER — DIPHENHYDRAMINE HYDROCHLORIDE 50 MG/ML
50 INJECTION INTRAMUSCULAR; INTRAVENOUS
Status: CANCELLED
Start: 2021-01-12

## 2020-12-26 RX ORDER — MEPERIDINE HYDROCHLORIDE 25 MG/ML
25 INJECTION INTRAMUSCULAR; INTRAVENOUS; SUBCUTANEOUS EVERY 30 MIN PRN
Status: CANCELLED | OUTPATIENT
Start: 2021-01-05

## 2020-12-26 RX ORDER — NALOXONE HYDROCHLORIDE 0.4 MG/ML
.1-.4 INJECTION, SOLUTION INTRAMUSCULAR; INTRAVENOUS; SUBCUTANEOUS
Status: CANCELLED | OUTPATIENT
Start: 2020-12-29

## 2020-12-26 RX ORDER — NALOXONE HYDROCHLORIDE 0.4 MG/ML
.1-.4 INJECTION, SOLUTION INTRAMUSCULAR; INTRAVENOUS; SUBCUTANEOUS
Status: CANCELLED | OUTPATIENT
Start: 2021-01-05

## 2020-12-26 RX ORDER — LORAZEPAM 2 MG/ML
0.5 INJECTION INTRAMUSCULAR EVERY 4 HOURS PRN
Status: CANCELLED
Start: 2021-01-19

## 2020-12-26 RX ORDER — ALBUTEROL SULFATE 0.83 MG/ML
2.5 SOLUTION RESPIRATORY (INHALATION)
Status: CANCELLED | OUTPATIENT
Start: 2021-01-05

## 2020-12-26 RX ORDER — DIPHENHYDRAMINE HCL 50 MG
50 CAPSULE ORAL ONCE
Status: CANCELLED | OUTPATIENT
Start: 2020-12-29

## 2020-12-26 RX ORDER — DIPHENHYDRAMINE HYDROCHLORIDE 50 MG/ML
50 INJECTION INTRAMUSCULAR; INTRAVENOUS
Status: CANCELLED
Start: 2020-12-29

## 2020-12-26 RX ORDER — METHYLPREDNISOLONE SODIUM SUCCINATE 125 MG/2ML
125 INJECTION, POWDER, LYOPHILIZED, FOR SOLUTION INTRAMUSCULAR; INTRAVENOUS
Status: CANCELLED
Start: 2021-01-12

## 2020-12-26 RX ORDER — MEPERIDINE HYDROCHLORIDE 25 MG/ML
25 INJECTION INTRAMUSCULAR; INTRAVENOUS; SUBCUTANEOUS EVERY 30 MIN PRN
Status: CANCELLED | OUTPATIENT
Start: 2021-01-12

## 2020-12-26 RX ORDER — METHYLPREDNISOLONE SODIUM SUCCINATE 125 MG/2ML
125 INJECTION, POWDER, LYOPHILIZED, FOR SOLUTION INTRAMUSCULAR; INTRAVENOUS
Status: CANCELLED
Start: 2020-12-29

## 2020-12-26 RX ORDER — DIPHENHYDRAMINE HYDROCHLORIDE 50 MG/ML
50 INJECTION INTRAMUSCULAR; INTRAVENOUS
Status: CANCELLED
Start: 2021-01-19

## 2020-12-26 RX ORDER — ACETAMINOPHEN 325 MG/1
650 TABLET ORAL ONCE
Status: CANCELLED | OUTPATIENT
Start: 2021-01-19

## 2020-12-26 RX ORDER — MEPERIDINE HYDROCHLORIDE 25 MG/ML
25 INJECTION INTRAMUSCULAR; INTRAVENOUS; SUBCUTANEOUS EVERY 30 MIN PRN
Status: CANCELLED | OUTPATIENT
Start: 2021-01-19

## 2020-12-26 RX ORDER — DEXAMETHASONE 4 MG/1
12 TABLET ORAL ONCE
Status: CANCELLED | OUTPATIENT
Start: 2021-01-12

## 2020-12-26 RX ORDER — ACETAMINOPHEN 325 MG/1
650 TABLET ORAL ONCE
Status: CANCELLED | OUTPATIENT
Start: 2020-12-29

## 2020-12-26 RX ORDER — SODIUM CHLORIDE 9 MG/ML
1000 INJECTION, SOLUTION INTRAVENOUS CONTINUOUS PRN
Status: CANCELLED
Start: 2021-01-19

## 2020-12-26 RX ORDER — EPINEPHRINE 1 MG/ML
0.3 INJECTION, SOLUTION, CONCENTRATE INTRAVENOUS EVERY 5 MIN PRN
Status: CANCELLED | OUTPATIENT
Start: 2020-12-29

## 2020-12-26 RX ORDER — DEXAMETHASONE 4 MG/1
12 TABLET ORAL ONCE
Status: CANCELLED | OUTPATIENT
Start: 2021-01-19

## 2020-12-26 RX ORDER — MEPERIDINE HYDROCHLORIDE 25 MG/ML
25 INJECTION INTRAMUSCULAR; INTRAVENOUS; SUBCUTANEOUS EVERY 30 MIN PRN
Status: CANCELLED | OUTPATIENT
Start: 2020-12-29

## 2020-12-26 RX ORDER — DEXAMETHASONE 4 MG/1
12 TABLET ORAL ONCE
Status: CANCELLED | OUTPATIENT
Start: 2020-12-29

## 2020-12-26 RX ORDER — LORAZEPAM 2 MG/ML
0.5 INJECTION INTRAMUSCULAR EVERY 4 HOURS PRN
Status: CANCELLED
Start: 2020-12-29

## 2020-12-26 RX ORDER — EPINEPHRINE 1 MG/ML
0.3 INJECTION, SOLUTION, CONCENTRATE INTRAVENOUS EVERY 5 MIN PRN
Status: CANCELLED | OUTPATIENT
Start: 2021-01-05

## 2020-12-26 RX ORDER — EPINEPHRINE 1 MG/ML
0.3 INJECTION, SOLUTION, CONCENTRATE INTRAVENOUS EVERY 5 MIN PRN
Status: CANCELLED | OUTPATIENT
Start: 2021-01-19

## 2020-12-26 RX ORDER — DIPHENHYDRAMINE HCL 50 MG
50 CAPSULE ORAL ONCE
Status: CANCELLED | OUTPATIENT
Start: 2021-01-12

## 2020-12-26 RX ORDER — METHYLPREDNISOLONE SODIUM SUCCINATE 125 MG/2ML
125 INJECTION, POWDER, LYOPHILIZED, FOR SOLUTION INTRAMUSCULAR; INTRAVENOUS
Status: CANCELLED
Start: 2021-01-19

## 2020-12-26 RX ORDER — SODIUM CHLORIDE 9 MG/ML
1000 INJECTION, SOLUTION INTRAVENOUS CONTINUOUS PRN
Status: CANCELLED
Start: 2021-01-12

## 2020-12-26 RX ORDER — ACETAMINOPHEN 325 MG/1
650 TABLET ORAL ONCE
Status: CANCELLED | OUTPATIENT
Start: 2021-01-05

## 2020-12-26 RX ORDER — ALBUTEROL SULFATE 90 UG/1
1-2 AEROSOL, METERED RESPIRATORY (INHALATION)
Status: CANCELLED
Start: 2021-01-19

## 2020-12-26 RX ORDER — ALBUTEROL SULFATE 0.83 MG/ML
2.5 SOLUTION RESPIRATORY (INHALATION)
Status: CANCELLED | OUTPATIENT
Start: 2021-01-12

## 2020-12-26 RX ORDER — DIPHENHYDRAMINE HYDROCHLORIDE 50 MG/ML
50 INJECTION INTRAMUSCULAR; INTRAVENOUS
Status: CANCELLED
Start: 2021-01-05

## 2020-12-26 RX ORDER — EPINEPHRINE 1 MG/ML
0.3 INJECTION, SOLUTION, CONCENTRATE INTRAVENOUS EVERY 5 MIN PRN
Status: CANCELLED | OUTPATIENT
Start: 2021-01-12

## 2020-12-26 RX ORDER — HEPARIN SODIUM,PORCINE 10 UNIT/ML
5 VIAL (ML) INTRAVENOUS
Status: CANCELLED | OUTPATIENT
Start: 2021-01-12

## 2020-12-26 RX ORDER — DIPHENHYDRAMINE HCL 50 MG
50 CAPSULE ORAL ONCE
Status: CANCELLED | OUTPATIENT
Start: 2021-01-19

## 2020-12-26 RX ORDER — DEXAMETHASONE 4 MG/1
12 TABLET ORAL ONCE
Status: CANCELLED | OUTPATIENT
Start: 2021-01-05

## 2020-12-26 RX ORDER — METHYLPREDNISOLONE SODIUM SUCCINATE 125 MG/2ML
125 INJECTION, POWDER, LYOPHILIZED, FOR SOLUTION INTRAMUSCULAR; INTRAVENOUS
Status: CANCELLED
Start: 2021-01-05

## 2020-12-26 RX ORDER — ALBUTEROL SULFATE 0.83 MG/ML
2.5 SOLUTION RESPIRATORY (INHALATION)
Status: CANCELLED | OUTPATIENT
Start: 2020-12-29

## 2020-12-26 RX ORDER — NALOXONE HYDROCHLORIDE 0.4 MG/ML
.1-.4 INJECTION, SOLUTION INTRAMUSCULAR; INTRAVENOUS; SUBCUTANEOUS
Status: CANCELLED | OUTPATIENT
Start: 2021-01-19

## 2020-12-26 RX ORDER — HEPARIN SODIUM (PORCINE) LOCK FLUSH IV SOLN 100 UNIT/ML 100 UNIT/ML
5 SOLUTION INTRAVENOUS
Status: CANCELLED | OUTPATIENT
Start: 2021-01-05

## 2020-12-26 RX ORDER — LORAZEPAM 2 MG/ML
0.5 INJECTION INTRAMUSCULAR EVERY 4 HOURS PRN
Status: CANCELLED
Start: 2021-01-05

## 2020-12-26 RX ORDER — HEPARIN SODIUM,PORCINE 10 UNIT/ML
5 VIAL (ML) INTRAVENOUS
Status: CANCELLED | OUTPATIENT
Start: 2020-12-29

## 2020-12-26 RX ORDER — HEPARIN SODIUM (PORCINE) LOCK FLUSH IV SOLN 100 UNIT/ML 100 UNIT/ML
5 SOLUTION INTRAVENOUS
Status: CANCELLED | OUTPATIENT
Start: 2021-01-19

## 2020-12-26 RX ORDER — NALOXONE HYDROCHLORIDE 0.4 MG/ML
.1-.4 INJECTION, SOLUTION INTRAMUSCULAR; INTRAVENOUS; SUBCUTANEOUS
Status: CANCELLED | OUTPATIENT
Start: 2021-01-12

## 2020-12-26 RX ORDER — HEPARIN SODIUM (PORCINE) LOCK FLUSH IV SOLN 100 UNIT/ML 100 UNIT/ML
5 SOLUTION INTRAVENOUS
Status: CANCELLED | OUTPATIENT
Start: 2020-12-29

## 2020-12-26 RX ORDER — HEPARIN SODIUM,PORCINE 10 UNIT/ML
5 VIAL (ML) INTRAVENOUS
Status: CANCELLED | OUTPATIENT
Start: 2021-01-19

## 2020-12-26 RX ORDER — SODIUM CHLORIDE 9 MG/ML
1000 INJECTION, SOLUTION INTRAVENOUS CONTINUOUS PRN
Status: CANCELLED
Start: 2020-12-29

## 2020-12-26 RX ORDER — ALBUTEROL SULFATE 90 UG/1
1-2 AEROSOL, METERED RESPIRATORY (INHALATION)
Status: CANCELLED
Start: 2020-12-29

## 2020-12-26 RX ORDER — DIPHENHYDRAMINE HCL 50 MG
50 CAPSULE ORAL ONCE
Status: CANCELLED | OUTPATIENT
Start: 2021-01-05

## 2020-12-26 RX ORDER — HEPARIN SODIUM,PORCINE 10 UNIT/ML
5 VIAL (ML) INTRAVENOUS
Status: CANCELLED | OUTPATIENT
Start: 2021-01-05

## 2020-12-26 RX ORDER — SODIUM CHLORIDE 9 MG/ML
1000 INJECTION, SOLUTION INTRAVENOUS CONTINUOUS PRN
Status: CANCELLED
Start: 2021-01-05

## 2020-12-26 RX ORDER — ALBUTEROL SULFATE 0.83 MG/ML
2.5 SOLUTION RESPIRATORY (INHALATION)
Status: CANCELLED | OUTPATIENT
Start: 2021-01-19

## 2020-12-26 NOTE — PROGRESS NOTES
Oncology Follow-up Visit:  December 29, 2020    Reason for Visit:  Patient presents with:  RECHECK: Follow up Multiple Myeloma     Nursing Note and documentation reviewed: yes    HPI:   This is a 70-year-old female patient who presents to the oncology clinic today for evaluation prior to receiving cycle 2 day 1 therapy for Stage II-RISS IgG multiple myeloma diagnosed June 2019.  She progressed on Velcade and dexamethasone and CyBorD and is now receiving Darzalex faspro injection.    She presents to the clinic today stating she feels she tolerates the injection well.  She has no new complaints today.  She states she did increase her Zoloft from 25 mg to 50 mg about a month ago and feels this is helping from an anxiety/depression standpoint.  Her motivation is better.  She does have some mild fatigue but it does not interfere with her activities of daily living.  She had one episode of loose stool throughout the last month otherwise no changes.    Oncologic History:     12/31/2018   patient presented to the emergency room with vertigo and fatigue.  CT scan of the head was negative and subsequent stress test was negative.  5/3/2019  She was seen by her PCP who ordered lab work and noted a total protein of 12.9.  SPEP at that time showed an M spike of 6.2 with a large monoclonal protein seen in the gamma fraction.  Urine immunofixation showed a possible small protein band in the gamma fraction  5/31/2019  she was evaluated by Dr. Walker with Medical Oncology with plan to rule out myeloma and obtain bone marrow aspiration biopsy as well as a metastatic bone survey along with additional labwork  6/18/2019 she underwent bone marrow aspiration and biopsy  6/24/2019  She was seen again by Dr. Walker and CBC showed a hemoglobin of 9.3, M spike 7.3 with monoclonal IgG immunoglobulin of kappa light chain type; serum viscosity was 2.9; quantitative immunoglobulins showed an IgG of 8160, beta-2 microglobulin was 5.8, BUN was  21 with creatinine is 0.8 and total protein was 13.7.  Quantitative kappa/lambda free light chains showed an elevated ratio of 17.0 bone marrow aspiration biopsy showed plasma cell myeloma with approximately 80% plasma cells.  Immunofixation showed IgG kappa and flow cytometry revealed kappa monotypic plasma cells consistent with clonal plasma cell neoplasm and FISH panel was pending at that time.  It was felt she had at least stage II disease based on her beta-2 microglobulin and anemia.  Plan was to treat with Velcade 1.3 mg per metered squared days 1, 4, 8 and 11/Decadron 40 mg on days 1, 8 and 15 initiation of Revlimid with the second cycle at 25 mg daily days 1 through 14.  Plan was to also obtain an MRI of the lumbar spine to rule out lytic lesion at L3.  She was initiated on Zovirax 400 mg p.o. twice daily.  6/25/2019  1st cycle of chemotherapy initiated  7/1/2019 note in chart regarding patient's large co-pay for the Revlimid and no plan at this point to initiate Revlimid and treat only with Velcade and Decadron per Dr. Walker  7/11/2019  MRI lumbar spine shows a pathologic superior endplate compression fracture at L3 without evidence of retropulsed fragment and innumerable enhancing lesions throughout the lumbar spine consistent with history of multiple myeloma.  9/10/2019  Increasing M-spike and kappa lambda ratio  10/1/2019  Initiation of CyBorD  12/1/2020  Initiation of Darzalex Faspro injection     Current Chemo Regime/TX:  Darzalex faspro injection 1800mg subcutaneous per protocol      **Zometa 4mg every 3 months  Current Cycle: 2 day 1  # of completed cycles:  1     Previous treatment:   Velcade 1.3 mg/m2 days 1, 4, 8 and 11 with Decadron 40 mg days 1, 8 and 15 x 4 cycles;   Velcade 1.5mg.m2/cyclophosphamide 150mg every 7 days on days 1,8,15 and 22/decadron 40mg days 1,8,15,22     Past Medical History:   Diagnosis Date     Arthritis      Depressive disorder      Essential hypertension 10/1/2015      "Major depressive disorder, recurrent episode, mild (H) 10/1/2015     Mixed hyperlipidemia 10/1/2015     Multiple myeloma not having achieved remission (H) 6/24/2019     Other specified disorders of bladder 07/09/2012    Irritable Bladder     Seasonal allergies 10/1/2015     Unspecified essential hypertension 03/19/2007     Unspecified sinusitis (chronic) 09/05/2007       Past Surgical History:   Procedure Laterality Date     APPENDECTOMY       BONE MARROW BIOPSY, BONE SPECIMEN, NEEDLE/TROCAR N/A 6/18/2019    Procedure: BONE MARROW BIOPSY;  Surgeon: Maciej Sanz MD;  Location: [facility]     CHOLECYSTECTOMY       COLONOSCOPY  07-    repeat 10 years     COLONOSCOPY N/A 12/30/2016    Procedure: COLONOSCOPY;  Surgeon: Bhaskar Franklin DO;  Location: [facility]     SINUS SURGERY       TUBAL STERILIZATION         Family History   Problem Relation Age of Onset     Breast Cancer Mother 66        Cause of Death     Parkinsonism Father         \"Possible\"     Coronary Artery Disease Father      Pacemaker Father      Thyroid Disease Daughter      Diabetes No family hx of      Hypertension No family hx of      Hyperlipidemia No family hx of      Cerebrovascular Disease No family hx of      Colon Cancer No family hx of      Prostate Cancer No family hx of      Genetic Disorder No family hx of      Asthma No family hx of      Anesthesia Reaction No family hx of        Social History     Socioeconomic History     Marital status:      Spouse name: Not on file     Number of children: Not on file     Years of education: Not on file     Highest education level: Not on file   Occupational History     Occupation: Financial     Comment:  - (FT)   Social Needs     Financial resource strain: Not on file     Food insecurity     Worry: Not on file     Inability: Not on file     Transportation needs     Medical: Not on file     Non-medical: Not on file   Tobacco Use     Smoking status: Never Smoker     Smokeless " tobacco: Never Used     Tobacco comment: Tried to Quit (YES); QUIT in 1971; Passive Exposure (NO)   Substance and Sexual Activity     Alcohol use: Yes     Comment: RARELY     Drug use: No     Sexual activity: Yes     Partners: Male     Birth control/protection: None   Lifestyle     Physical activity     Days per week: Not on file     Minutes per session: Not on file     Stress: Not on file   Relationships     Social connections     Talks on phone: Not on file     Gets together: Not on file     Attends [employer] service: Not on file     Active member of club or organization: Not on file     Attends meetings of clubs or organizations: Not on file     Relationship status: Not on file     Intimate partner violence     Fear of current or ex partner: Not on file     Emotionally abused: Not on file     Physically abused: Not on file     Forced sexual activity: Not on file   Other Topics Concern      Service Not Asked     Blood Transfusions Not Asked     Caffeine Concern Yes     Comment: Coffee >6 cups daily     Occupational Exposure Not Asked     Hobby Hazards Not Asked     Sleep Concern Not Asked     Stress Concern Not Asked     Weight Concern Not Asked     Special Diet Not Asked     Back Care Not Asked     Exercise Not Asked     Bike Helmet Not Asked     Seat Belt Not Asked     Self-Exams Not Asked     Parent/sibling w/ CABG, MI or angioplasty before 65F 55M? No   Social History Narrative     Not on file       Current Outpatient Medications   Medication     acyclovir (ZOVIRAX) 400 MG tablet     aspirin (ASA) 81 MG chewable tablet     atorvastatin (LIPITOR) 10 MG tablet     dexamethasone (DECADRON) 4 MG tablet     fluticasone (FLONASE) 50 MCG/ACT nasal spray     losartan (COZAAR) 50 MG tablet     sertraline (ZOLOFT) 50 MG tablet     acyclovir (ZOVIRAX) 400 MG tablet     EPINEPHrine (EPIPEN) 0.3 MG/0.3ML injection     prochlorperazine (COMPAZINE) 10 MG tablet     No current facility-administered medications for  "this visit.         Allergies   Allergen Reactions     Lisinopril Cough     Phenylephrine Hcl Other (See Comments)     **Entex  HEADACHE (SEVERE)     Phenylpropanolamine Other (See Comments)     **Entex  HEADACHE (SEVERE)     Pseudoephedrine Tannate Other (See Comments)     **Entex  HEADACHE (SEVERE)     Levofloxacin Rash     **Levaquin     Moxifloxacin Hcl [Avelox] Rash       Review Of Systems:  Constitutional:    denies fever, weight changes, chills, and night sweats.  Eyes:    denies blurred or double vision  Ears/Nose/Throat:   denies ear pain, nose problems, difficulty swallowing-saw ENT today-left eardrum has hole; right eardrum normal  Respiratory:   denies shortness of breath, cough   Skin:   denies rash, lesions  Cardiovascular:   denies chest pain, palpitations, edema  Gastrointestinal:   denies abdominal pain, nausea, vomiting, early satiety; no change in bowel habits or blood in stool  Genitourinary:   denies difficulty with urination, blood in urine  Musculoskeletal:    denies new muscle pain, bone pain  Neurologic:   denies lightheadedness, headaches, numbness or tingling  Psychiatric:   denies anxiety, depression-increased zoloft to 50mg daily 1 month ago and this has helped  Hematologic/Lymphatic/Immunologic:   denies easy bruising, easy bleeding, lumps or bumps noted  Endocrine:   Denies increased thirst    Fatigue (0=no fatigue; 10=worst fatigue imaginable): 3    ECOG Performance Status: 0    Physical Exam:  /74   Pulse 92   Temp 98.8  F (37.1  C) (Tympanic)   Resp 18   Ht 1.6 m (5' 3\")   Wt 76 kg (167 lb 8.8 oz)   SpO2 97%   BMI 29.68 kg/m      GENERAL APPEARANCE: Healthy, alert and in no acute distress.  HEENT: Normocephalic, Sclerae anicteric. Oropharynx without ulcers, lesions, or thrush.  NECK:   No asymmetry or masses, no thyromegaly.  LYMPHATICS: No palpable cervical, supraclavicular, axillary, or inguinal nodes   RESP: Lungs clear to auscultation bilaterally, respirations " regular and easy  CARDIOVASCULAR: Regular rate and rhythm. Normal S1, S2; no murmur, gallop, or rub.  ABDOMEN: Soft, nontender. Bowel sounds auscultated all 4 quadrants. No palpable organomegaly or masses.  MUSCULOSKELETAL: Extremities without gross deformities noted.  NEURO: Alert and oriented x 3.  Gait steady.  PSYCHIATRIC: Mentation and affect appear normal.  Mood appropriate.    Laboratory:    Results for orders placed or performed in visit on 12/29/20   CBC with platelets differential     Status: None   Result Value Ref Range    WBC 6.0 4.0 - 11.0 10e9/L    RBC Count 4.14 3.8 - 5.2 10e12/L    Hemoglobin 13.4 11.7 - 15.7 g/dL    Hematocrit 39.1 35.0 - 47.0 %    MCV 94 78 - 100 fl    MCH 32.4 26.5 - 33.0 pg    MCHC 34.3 31.5 - 36.5 g/dL    RDW 13.7 10.0 - 15.0 %    Platelet Count 218 150 - 450 10e9/L    Diff Method Automated Method     % Neutrophils 66.9 %    % Lymphocytes 16.8 %    % Monocytes 12.2 %    % Eosinophils 2.5 %    % Basophils 0.3 %    % Immature Granulocytes 1.3 %    Nucleated RBCs 0 0 /100    Absolute Neutrophil 4.0 1.6 - 8.3 10e9/L    Absolute Lymphocytes 1.0 0.8 - 5.3 10e9/L    Absolute Monocytes 0.7 0.0 - 1.3 10e9/L    Absolute Eosinophils 0.2 0.0 - 0.7 10e9/L    Absolute Basophils 0.0 0.0 - 0.2 10e9/L    Abs Immature Granulocytes 0.1 0 - 0.4 10e9/L    Absolute Nucleated RBC 0.0      Component      Latest Ref Rng & Units 12/29/2020   Sodium      133 - 144 mmol/L 137   Potassium      3.4 - 5.3 mmol/L 4.0   Chloride      94 - 109 mmol/L 104   Carbon Dioxide      20 - 32 mmol/L 28   Anion Gap      3 - 14 mmol/L 5   Glucose      70 - 99 mg/dL 107 (H)   Urea Nitrogen      7 - 30 mg/dL 21   Creatinine      0.52 - 1.04 mg/dL 0.70   GFR Estimate      >60 mL/min/1.73:m2 87   GFR Estimate If Black      >60 mL/min/1.73:m2 >90   Calcium      8.5 - 10.1 mg/dL 8.9   Bilirubin Total      0.2 - 1.3 mg/dL 0.2   Albumin      3.4 - 5.0 g/dL 3.2 (L)   Protein Total      6.8 - 8.8 g/dL 7.4   Alkaline Phosphatase       40 - 150 U/L 153 (H)   ALT      0 - 50 U/L 20   AST      0 - 45 U/L 15     Component      Latest Ref Rng & Units 12/23/2020   Albumin Fraction      3.7 - 5.1 g/dL 3.9   Alpha 1 Fraction      0.2 - 0.4 g/dL 0.3   Alpha 2 Fraction      0.5 - 0.9 g/dL 0.8   Beta Fraction      0.6 - 1.0 g/dL 0.6   Gamma Fraction      0.7 - 1.6 g/dL 1.5   Monoclonal Peak      0.0 g/dL 1.1 (H)   ELP Interpretation:       Monoclonal protein (about 1.1 g/dL) seen in the gamma fraction. See immunofixation report . . .   Immunofixation ELP       (Note)   IGG      610 - 1,616 mg/dL 1,729 (H)   IGA      84 - 499 mg/dL 10 (L)   IGM      35 - 242 mg/dL 11 (L)   Beta-2-Microglobulin      <2.3 mg/L 2.9 (H)   Viscosity Index      1.4 - 1.8 1.9 (H)   Immunofix ELP Urine       (Note)     Imaging Studies:  None completed for today's visit      ASSESSMENT/PLAN:    #1 Multiple myeloma:   IgG kappa multiple myeloma with 80% involvement of the bone marrow diagnosed June 2019 initially treated with Velcade and Decadron and received 4 cycles with increasing M-spike and kappa/lambda ratio.  Treatment changed to CyBorD on 10/1/2019.  M-spike plateau at 1.2 and treatment changed to Darzalex Faspro injection.  M-spike at 1.1.  She will initiate cycle 2 today and follow up prior to cycle 3 with labs per treatment plan.     #2  Bone lesions: Continue Zometa every 3 months.  Due February 2021.     I encouraged patient to call with any questions or concerns.     Barbie Quintana, NP  APRN, FNP-BC, AOCNP

## 2020-12-28 LAB
ALBUMIN SERPL ELPH-MCNC: 3.9 G/DL (ref 3.7–5.1)
ALPHA1 GLOB SERPL ELPH-MCNC: 0.3 G/DL (ref 0.2–0.4)
ALPHA2 GLOB SERPL ELPH-MCNC: 0.8 G/DL (ref 0.5–0.9)
B-GLOBULIN SERPL ELPH-MCNC: 0.6 G/DL (ref 0.6–1)
B2 MICROGLOB SERPL-MCNC: 2.9 MG/L
GAMMA GLOB SERPL ELPH-MCNC: 1.5 G/DL (ref 0.7–1.6)
IGA SERPL-MCNC: 10 MG/DL (ref 84–499)
IGG SERPL-MCNC: 1729 MG/DL (ref 610–1616)
IGM SERPL-MCNC: 11 MG/DL (ref 35–242)
M PROTEIN SERPL ELPH-MCNC: 1.1 G/DL
PROT PATTERN SERPL ELPH-IMP: ABNORMAL
PROT PATTERN SERPL IFE-IMP: ABNORMAL
VISC SER: 1.9 CP (ref 1.4–1.8)

## 2020-12-29 ENCOUNTER — ONCOLOGY VISIT (OUTPATIENT)
Dept: ONCOLOGY | Facility: OTHER | Age: 70
End: 2020-12-29
Attending: NURSE PRACTITIONER
Payer: MEDICARE

## 2020-12-29 ENCOUNTER — APPOINTMENT (OUTPATIENT)
Dept: LAB | Facility: OTHER | Age: 70
End: 2020-12-29
Attending: INTERNAL MEDICINE
Payer: MEDICARE

## 2020-12-29 ENCOUNTER — INFUSION THERAPY VISIT (OUTPATIENT)
Dept: INFUSION THERAPY | Facility: OTHER | Age: 70
End: 2020-12-29
Attending: INTERNAL MEDICINE
Payer: MEDICARE

## 2020-12-29 ENCOUNTER — OFFICE VISIT (OUTPATIENT)
Dept: OTOLARYNGOLOGY | Facility: OTHER | Age: 70
End: 2020-12-29
Attending: PHYSICIAN ASSISTANT
Payer: MEDICARE

## 2020-12-29 VITALS
TEMPERATURE: 98.8 F | SYSTOLIC BLOOD PRESSURE: 115 MMHG | HEART RATE: 92 BPM | HEIGHT: 63 IN | BODY MASS INDEX: 29.69 KG/M2 | OXYGEN SATURATION: 97 % | RESPIRATION RATE: 18 BRPM | WEIGHT: 167.55 LBS | DIASTOLIC BLOOD PRESSURE: 74 MMHG

## 2020-12-29 VITALS
OXYGEN SATURATION: 97 % | HEART RATE: 94 BPM | WEIGHT: 169.99 LBS | SYSTOLIC BLOOD PRESSURE: 102 MMHG | DIASTOLIC BLOOD PRESSURE: 58 MMHG | HEIGHT: 63 IN | TEMPERATURE: 98.5 F | BODY MASS INDEX: 30.12 KG/M2

## 2020-12-29 DIAGNOSIS — C90.00 MULTIPLE MYELOMA NOT HAVING ACHIEVED REMISSION (H): ICD-10-CM

## 2020-12-29 DIAGNOSIS — H90.6 MIXED HEARING LOSS, BILATERAL: ICD-10-CM

## 2020-12-29 DIAGNOSIS — C90.00 MULTIPLE MYELOMA NOT HAVING ACHIEVED REMISSION (H): Primary | ICD-10-CM

## 2020-12-29 DIAGNOSIS — H72.92 PERFORATION OF TYMPANIC MEMBRANE, LEFT: Primary | ICD-10-CM

## 2020-12-29 DIAGNOSIS — Z98.890 H/O MYRINGOTOMY: ICD-10-CM

## 2020-12-29 DIAGNOSIS — M89.9 BONE LESION: ICD-10-CM

## 2020-12-29 LAB
ALBUMIN SERPL-MCNC: 3.2 G/DL (ref 3.4–5)
ALP SERPL-CCNC: 153 U/L (ref 40–150)
ALT SERPL W P-5'-P-CCNC: 20 U/L (ref 0–50)
ANION GAP SERPL CALCULATED.3IONS-SCNC: 5 MMOL/L (ref 3–14)
AST SERPL W P-5'-P-CCNC: 15 U/L (ref 0–45)
BASOPHILS # BLD AUTO: 0 10E9/L (ref 0–0.2)
BASOPHILS NFR BLD AUTO: 0.3 %
BILIRUB SERPL-MCNC: 0.2 MG/DL (ref 0.2–1.3)
BUN SERPL-MCNC: 21 MG/DL (ref 7–30)
CALCIUM SERPL-MCNC: 8.9 MG/DL (ref 8.5–10.1)
CHLORIDE SERPL-SCNC: 104 MMOL/L (ref 94–109)
CO2 SERPL-SCNC: 28 MMOL/L (ref 20–32)
CREAT SERPL-MCNC: 0.7 MG/DL (ref 0.52–1.04)
DIFFERENTIAL METHOD BLD: NORMAL
EOSINOPHIL # BLD AUTO: 0.2 10E9/L (ref 0–0.7)
EOSINOPHIL NFR BLD AUTO: 2.5 %
ERYTHROCYTE [DISTWIDTH] IN BLOOD BY AUTOMATED COUNT: 13.7 % (ref 10–15)
GFR SERPL CREATININE-BSD FRML MDRD: 87 ML/MIN/{1.73_M2}
GLUCOSE SERPL-MCNC: 107 MG/DL (ref 70–99)
HCT VFR BLD AUTO: 39.1 % (ref 35–47)
HGB BLD-MCNC: 13.4 G/DL (ref 11.7–15.7)
IMM GRANULOCYTES # BLD: 0.1 10E9/L (ref 0–0.4)
IMM GRANULOCYTES NFR BLD: 1.3 %
LYMPHOCYTES # BLD AUTO: 1 10E9/L (ref 0.8–5.3)
LYMPHOCYTES NFR BLD AUTO: 16.8 %
MCH RBC QN AUTO: 32.4 PG (ref 26.5–33)
MCHC RBC AUTO-ENTMCNC: 34.3 G/DL (ref 31.5–36.5)
MCV RBC AUTO: 94 FL (ref 78–100)
MONOCYTES # BLD AUTO: 0.7 10E9/L (ref 0–1.3)
MONOCYTES NFR BLD AUTO: 12.2 %
NEUTROPHILS # BLD AUTO: 4 10E9/L (ref 1.6–8.3)
NEUTROPHILS NFR BLD AUTO: 66.9 %
NRBC # BLD AUTO: 0 10*3/UL
NRBC BLD AUTO-RTO: 0 /100
PLATELET # BLD AUTO: 218 10E9/L (ref 150–450)
POTASSIUM SERPL-SCNC: 4 MMOL/L (ref 3.4–5.3)
PROT SERPL-MCNC: 7.4 G/DL (ref 6.8–8.8)
RBC # BLD AUTO: 4.14 10E12/L (ref 3.8–5.2)
SODIUM SERPL-SCNC: 137 MMOL/L (ref 133–144)
WBC # BLD AUTO: 6 10E9/L (ref 4–11)

## 2020-12-29 PROCEDURE — 80053 COMPREHEN METABOLIC PANEL: CPT | Mod: ZL | Performed by: NURSE PRACTITIONER

## 2020-12-29 PROCEDURE — 36415 COLL VENOUS BLD VENIPUNCTURE: CPT | Mod: ZL | Performed by: NURSE PRACTITIONER

## 2020-12-29 PROCEDURE — 92504 EAR MICROSCOPY EXAMINATION: CPT | Performed by: PHYSICIAN ASSISTANT

## 2020-12-29 PROCEDURE — G0463 HOSPITAL OUTPT CLINIC VISIT: HCPCS

## 2020-12-29 PROCEDURE — G0463 HOSPITAL OUTPT CLINIC VISIT: HCPCS | Mod: 25,27

## 2020-12-29 PROCEDURE — 250N000013 HC RX MED GY IP 250 OP 250 PS 637: Performed by: NURSE PRACTITIONER

## 2020-12-29 PROCEDURE — G0463 HOSPITAL OUTPT CLINIC VISIT: HCPCS | Mod: 25

## 2020-12-29 PROCEDURE — 96401 CHEMO ANTI-NEOPL SQ/IM: CPT

## 2020-12-29 PROCEDURE — C9062 DARATUMUMAB HYALURONIDASE: HCPCS | Performed by: NURSE PRACTITIONER

## 2020-12-29 PROCEDURE — 250N000011 HC RX IP 250 OP 636: Performed by: NURSE PRACTITIONER

## 2020-12-29 PROCEDURE — 250N000012 HC RX MED GY IP 250 OP 636 PS 637: Mod: GY | Performed by: NURSE PRACTITIONER

## 2020-12-29 PROCEDURE — G0463 HOSPITAL OUTPT CLINIC VISIT: HCPCS | Mod: 27,25

## 2020-12-29 PROCEDURE — 96372 THER/PROPH/DIAG INJ SC/IM: CPT | Performed by: NURSE PRACTITIONER

## 2020-12-29 PROCEDURE — 99213 OFFICE O/P EST LOW 20 MIN: CPT | Mod: 25 | Performed by: PHYSICIAN ASSISTANT

## 2020-12-29 PROCEDURE — 85025 COMPLETE CBC W/AUTO DIFF WBC: CPT | Mod: ZL | Performed by: NURSE PRACTITIONER

## 2020-12-29 PROCEDURE — 99213 OFFICE O/P EST LOW 20 MIN: CPT | Performed by: NURSE PRACTITIONER

## 2020-12-29 RX ORDER — DEXAMETHASONE 4 MG/1
12 TABLET ORAL ONCE
Status: COMPLETED | OUTPATIENT
Start: 2020-12-29 | End: 2020-12-29

## 2020-12-29 RX ORDER — ACETAMINOPHEN 325 MG/1
650 TABLET ORAL ONCE
Status: COMPLETED | OUTPATIENT
Start: 2020-12-29 | End: 2020-12-29

## 2020-12-29 RX ORDER — DIPHENHYDRAMINE HCL 50 MG
50 CAPSULE ORAL ONCE
Status: COMPLETED | OUTPATIENT
Start: 2020-12-29 | End: 2020-12-29

## 2020-12-29 RX ADMIN — DARATUMUMAB AND HYALURONIDASE-FIHJ (HUMAN RECOMBINANT) 1800 MG: 1800; 30000 INJECTION SUBCUTANEOUS at 11:33

## 2020-12-29 RX ADMIN — DIPHENHYDRAMINE HYDROCHLORIDE 50 MG: 50 CAPSULE ORAL at 10:15

## 2020-12-29 RX ADMIN — DEXAMETHASONE 12 MG: 4 TABLET ORAL at 10:14

## 2020-12-29 RX ADMIN — ACETAMINOPHEN 650 MG: 325 TABLET, FILM COATED ORAL at 10:15

## 2020-12-29 ASSESSMENT — MIFFLIN-ST. JEOR
SCORE: 1250.06
SCORE: 1239.13

## 2020-12-29 ASSESSMENT — PAIN SCALES - GENERAL
PAINLEVEL: NO PAIN (0)
PAINLEVEL: NO PAIN (0)

## 2020-12-29 NOTE — PROGRESS NOTES
Chief Complaint   Patient presents with     RECHECK PE TUBES     Pt is here for a tube check. BTT placed 9/20/19.       Patient returns to ENT for recheck of BTTI placed on 9/20/19 by Dr. Ley in office. Her last visit was in ENT on 9/28/20. She was doing well, noted increase in ear fullness, crackling and returned for tube check. Her ear exam reveled right tube was in canal and tube removed. There was no effusion and left tube had dried debris which was cleaned. She tolerated well.     Today, she has been doing fair and contusing chemotherapy at this time. She reports she has been on a targeted therapy and monitor hearing before pursuing hearing aid options.         Last audiogram was in 2019.   Type B, large volume tympanograms.   Thresholds are mild to severe mixed loss right and SNHL left.       Past Medical History:   Diagnosis Date     Arthritis      Depressive disorder      Essential hypertension 10/1/2015     Major depressive disorder, recurrent episode, mild (H) 10/1/2015     Mixed hyperlipidemia 10/1/2015     Multiple myeloma not having achieved remission (H) 6/24/2019     Other specified disorders of bladder 07/09/2012    Irritable Bladder     Seasonal allergies 10/1/2015     Unspecified essential hypertension 03/19/2007     Unspecified sinusitis (chronic) 09/05/2007        Allergies   Allergen Reactions     Lisinopril Cough     Phenylephrine Hcl Other (See Comments)     **Entex  HEADACHE (SEVERE)     Phenylpropanolamine Other (See Comments)     **Entex  HEADACHE (SEVERE)     Pseudoephedrine Tannate Other (See Comments)     **Entex  HEADACHE (SEVERE)     Levofloxacin Rash     **Levaquin     Moxifloxacin Hcl [Avelox] Rash     Current Outpatient Medications   Medication     acyclovir (ZOVIRAX) 400 MG tablet     acyclovir (ZOVIRAX) 400 MG tablet     aspirin (ASA) 81 MG chewable tablet     atorvastatin (LIPITOR) 10 MG tablet     dexamethasone (DECADRON) 4 MG tablet     EPINEPHrine (EPIPEN) 0.3 MG/0.3ML  "injection     fluticasone (FLONASE) 50 MCG/ACT nasal spray     losartan (COZAAR) 50 MG tablet     prochlorperazine (COMPAZINE) 10 MG tablet     sertraline (ZOLOFT) 50 MG tablet     No current facility-administered medications for this visit.      ROS- SEE HPI  /58 (Cuff Size: Adult Regular)   Pulse 94   Temp 98.5  F (36.9  C) (Tympanic)   Ht 1.6 m (5' 2.99\")   Wt 77.1 kg (169 lb 15.9 oz)   SpO2 97%   BMI 30.12 kg/m      General - The patient is well nourished and well developed, and appears to have good nutritional status.    Head and Face - Normocephalic and atraumatic, with no gross asymmetry noted of the contour of the facial features.  The facial nerve is intact, with strong symmetric movements.  Eyes - Extraocular movements intact, and the pupils were reactive to light.  Sclera were not icteric or injected, conjunctiva were pink and moist.  Mouth - Examination of the oral cavity shows pink, healthy, moist mucosa.  No lesions or ulceration noted.  The dentition are in good repair.  The tongue is mobile and midline.  Ears - Examination of the ears showed- Right canal clear. Right TM is intact without effusion or perforation. Ears examined under otologic microscopy. Canal cleaned with cupped forceps. Tube removed from canal along with thick cerumen.   TM is with perforation, central anterior about 15%. ME appears healthy and dry.     ASSESSMENT:    ICD-10-CM    1. Perforation of tympanic membrane, left  H72.92    2. H/O myringotomy w/ tube placement  Z98.890    3. Multiple myeloma not having achieved remission (H)  C90.00    4. Mixed hearing loss, bilateral  H90.6      Removed left tube from canal.   Right ear remains stable, no effusion.   Left ear has new perforation with good epithelization. At this time would recommend observation of perforation.   Recheck ear in 6 weeks with audiogram, then would follow for a full 6 months. She may consider observation only at this time vs. Surgical repair.       " Perforations often heal on there own if given 6 months of observation.  Dry ear precautions are necessary during this time period.  If it does not heal, a tympanoplasty is usually recommended.  This, when successful, may improve hearing, diminish contamination from water exposure and therefore infection and also decrease the likelihood of cholesteatoma.  The risks and benefits of a tympanoplasty were described  along with necessary pre-operative precautions.   A brochure was shared with the patient today regarding the perforated eardrum.                                                            Hearing loss will be monitored and could be affected by her chemotherapy.       Bridgette Ly PA-C  ENT  [facility]  [phone]

## 2020-12-29 NOTE — LETTER
12/29/2020         RE: Carmelita Watts  [address] [facility] [address]        Dear Colleague,    Thank you for referring your patient, Carmelita Watts, to the [facility]. Please see a copy of my visit note below.    Chief Complaint   Patient presents with     RECHECK PE TUBES     Pt is here for a tube check. BTT placed 9/20/19.       Patient returns to ENT for recheck of BTTI placed on 9/20/19 by Dr. Ley in office. Her last visit was in ENT on 9/28/20. She was doing well, noted increase in ear fullness, crackling and returned for tube check. Her ear exam reveled right tube was in canal and tube removed. There was no effusion and left tube had dried debris which was cleaned. She tolerated well.     Today, she has been doing fair and contusing chemotherapy at this time. She reports she has been on a targeted therapy and monitor hearing before pursuing hearing aid options.         Last audiogram was in 2019.   Type B, large volume tympanograms.   Thresholds are mild to severe mixed loss right and SNHL left.       Past Medical History:   Diagnosis Date     Arthritis      Depressive disorder      Essential hypertension 10/1/2015     Major depressive disorder, recurrent episode, mild (H) 10/1/2015     Mixed hyperlipidemia 10/1/2015     Multiple myeloma not having achieved remission (H) 6/24/2019     Other specified disorders of bladder 07/09/2012    Irritable Bladder     Seasonal allergies 10/1/2015     Unspecified essential hypertension 03/19/2007     Unspecified sinusitis (chronic) 09/05/2007        Allergies   Allergen Reactions     Lisinopril Cough     Phenylephrine Hcl Other (See Comments)     **Entex  HEADACHE (SEVERE)     Phenylpropanolamine Other (See Comments)     **Entex  HEADACHE (SEVERE)     Pseudoephedrine Tannate Other (See Comments)     **Entex  HEADACHE (SEVERE)     Levofloxacin Rash     **Levaquin     Moxifloxacin Hcl [Avelox] Rash     Current Outpatient Medications  "  Medication     acyclovir (ZOVIRAX) 400 MG tablet     acyclovir (ZOVIRAX) 400 MG tablet     aspirin (ASA) 81 MG chewable tablet     atorvastatin (LIPITOR) 10 MG tablet     dexamethasone (DECADRON) 4 MG tablet     EPINEPHrine (EPIPEN) 0.3 MG/0.3ML injection     fluticasone (FLONASE) 50 MCG/ACT nasal spray     losartan (COZAAR) 50 MG tablet     prochlorperazine (COMPAZINE) 10 MG tablet     sertraline (ZOLOFT) 50 MG tablet     No current facility-administered medications for this visit.      ROS- SEE HPI  /58 (Cuff Size: Adult Regular)   Pulse 94   Temp 98.5  F (36.9  C) (Tympanic)   Ht 1.6 m (5' 2.99\")   Wt 77.1 kg (169 lb 15.9 oz)   SpO2 97%   BMI 30.12 kg/m      General - The patient is well nourished and well developed, and appears to have good nutritional status.    Head and Face - Normocephalic and atraumatic, with no gross asymmetry noted of the contour of the facial features.  The facial nerve is intact, with strong symmetric movements.  Eyes - Extraocular movements intact, and the pupils were reactive to light.  Sclera were not icteric or injected, conjunctiva were pink and moist.  Mouth - Examination of the oral cavity shows pink, healthy, moist mucosa.  No lesions or ulceration noted.  The dentition are in good repair.  The tongue is mobile and midline.  Ears - Examination of the ears showed- Right canal clear. Right TM is intact without effusion or perforation. Ears examined under otologic microscopy. Canal cleaned with cupped forceps. Tube removed from canal along with thick cerumen.   TM is with perforation, central anterior about 15%. ME appears healthy and dry.     ASSESSMENT:    ICD-10-CM    1. Perforation of tympanic membrane, left  H72.92    2. H/O myringotomy w/ tube placement  Z98.890    3. Multiple myeloma not having achieved remission (H)  C90.00    4. Mixed hearing loss, bilateral  H90.6      Removed left tube from canal.   Right ear remains stable, no effusion.   Left ear has new " perforation with good epithelization. At this time would recommend observation of perforation.   Recheck ear in 6 weeks with audiogram, then would follow for a full 6 months. She may consider observation only at this time vs. Surgical repair.       Perforations often heal on there own if given 6 months of observation.  Dry ear precautions are necessary during this time period.  If it does not heal, a tympanoplasty is usually recommended.  This, when successful, may improve hearing, diminish contamination from water exposure and therefore infection and also decrease the likelihood of cholesteatoma.  The risks and benefits of a tympanoplasty were described  along with necessary pre-operative precautions.   A brochure was shared with the patient today regarding the perforated eardrum.                                                            Hearing loss will be monitored and could be affected by her chemotherapy.       Bridgette Ly PA-C  ENT  [facility]  [phone]          Again, thank you for allowing me to participate in the care of your patient.        Sincerely,        Bridgette Ly PA-C

## 2020-12-29 NOTE — PROGRESS NOTES
Patient is a 70 year old female  here today accompanied by self  for injection of Faspro per order of Bonita Quintana  under the supervision of Bonita Quintana.     Patient within parameters to administer injection; today's lab values are WNL.     Patient denies questions nor concerns regarding medication, administration site, side effects, nor aftercare.  Patient identified with two identifiers, order verified, and verbal consent for today's injection obtained from patient.   Patient education provided on s/s of injection site infection, and/or medication specific side effects, and when to call a provider.  Patient instructed to report any adverse effects.     Medication administered per protocol in Lower right abdomen  at 45  degree angle.  Site covered with sterile bandage.  Patient tolerated injection well, no verbal nor non-verbal signs of discomfort noted.  No adverse effects noted at this time.     Patient instructed to call with further questions or concerns.  Patient states understanding and is in agreement with this plan.  Copy of appointments, and after visit summary (AVS) given to patient. Patient discharged.

## 2020-12-29 NOTE — NURSING NOTE
"Chief Complaint   Patient presents with     RECHECK PE TUBES     Pt is here for a tube check. BTT placed 9/20/19.       Initial /58 (Cuff Size: Adult Regular)   Pulse 94   Temp 98.5  F (36.9  C) (Tympanic)   Ht 1.6 m (5' 2.99\")   Wt 77.1 kg (169 lb 15.9 oz)   SpO2 97%   BMI 30.12 kg/m   Estimated body mass index is 30.12 kg/m  as calculated from the following:    Height as of this encounter: 1.6 m (5' 2.99\").    Weight as of this encounter: 77.1 kg (169 lb 15.9 oz).  Medication Reconciliation: complete  Elizabeth Frances LPN    "

## 2020-12-29 NOTE — PATIENT INSTRUCTIONS
We will see you back as planned. If you have any questions or concerns, we can be reached Monday through Friday 8am - 430pm at [phone] ([facility] [place] [facility]). If you have concerns related to a potential reaction/side effect after hours/weekends/holiday's, please seek emergent medical care.

## 2020-12-29 NOTE — PATIENT INSTRUCTIONS
Complete Audiogram and ear check in 6 weeks.   Right ear is intact. No fluid or infection.   Left ear- tube removed. There is a perforation present.   Monitor for the next 6 months. Keep left ear dry.   If you develop drainage or new symptoms- contact nurses.       Thank you for allowing VENKATESH Purvis and our ENT team to participate in your care.  If your medications are too expensive, please give the nurse a call.  We can possibly change this medication.  If you have a scheduling or an appointment question please contact our Health Unit Coordinator at their direct line [phone].   ALL nursing questions or concerns can be directed to your ENT nurse at: [phone]--Ruby

## 2020-12-29 NOTE — NURSING NOTE
"Chief Complaint   Patient presents with     RECHECK     Follow up Multiple Myeloma       Initial /74   Pulse 92   Temp 98.8  F (37.1  C) (Tympanic)   Resp 18   Ht 1.6 m (5' 3\")   Wt 76 kg (167 lb 8.8 oz)   SpO2 97%   BMI 29.68 kg/m   Estimated body mass index is 29.68 kg/m  as calculated from the following:    Height as of this encounter: 1.6 m (5' 3\").    Weight as of this encounter: 76 kg (167 lb 8.8 oz).  Medication Reconciliation: complete.  Immunizations and advance directives status reviewed. Pain scale =0 , PHQ-2=0.            Vilma Chi LPN    "

## 2020-12-29 NOTE — PATIENT INSTRUCTIONS
We would like to see you back per your schedule.     When you are in need of a refill, please call your pharmacy and they will send us a request.     If you have any questions please call [phone]    Other instructions:  none

## 2021-01-05 ENCOUNTER — INFUSION THERAPY VISIT (OUTPATIENT)
Dept: INFUSION THERAPY | Facility: OTHER | Age: 71
End: 2021-01-05
Attending: INTERNAL MEDICINE
Payer: MEDICARE

## 2021-01-05 VITALS
OXYGEN SATURATION: 96 % | HEIGHT: 63 IN | WEIGHT: 167.77 LBS | TEMPERATURE: 97.9 F | SYSTOLIC BLOOD PRESSURE: 120 MMHG | HEART RATE: 88 BPM | BODY MASS INDEX: 29.73 KG/M2 | DIASTOLIC BLOOD PRESSURE: 70 MMHG | RESPIRATION RATE: 16 BRPM

## 2021-01-05 DIAGNOSIS — C90.00 MULTIPLE MYELOMA NOT HAVING ACHIEVED REMISSION (H): Primary | ICD-10-CM

## 2021-01-05 DIAGNOSIS — Z79.899 ENCOUNTER FOR LONG-TERM (CURRENT) USE OF HIGH-RISK MEDICATION: ICD-10-CM

## 2021-01-05 LAB
BASOPHILS # BLD AUTO: 0 10E9/L (ref 0–0.2)
BASOPHILS NFR BLD AUTO: 0.3 %
DIFFERENTIAL METHOD BLD: NORMAL
EOSINOPHIL # BLD AUTO: 0.1 10E9/L (ref 0–0.7)
EOSINOPHIL NFR BLD AUTO: 1.6 %
ERYTHROCYTE [DISTWIDTH] IN BLOOD BY AUTOMATED COUNT: 13.6 % (ref 10–15)
HCT VFR BLD AUTO: 37.7 % (ref 35–47)
HGB BLD-MCNC: 12.9 G/DL (ref 11.7–15.7)
IMM GRANULOCYTES # BLD: 0 10E9/L (ref 0–0.4)
IMM GRANULOCYTES NFR BLD: 0.6 %
LYMPHOCYTES # BLD AUTO: 1.3 10E9/L (ref 0.8–5.3)
LYMPHOCYTES NFR BLD AUTO: 18.6 %
MCH RBC QN AUTO: 31.9 PG (ref 26.5–33)
MCHC RBC AUTO-ENTMCNC: 34.2 G/DL (ref 31.5–36.5)
MCV RBC AUTO: 93 FL (ref 78–100)
MONOCYTES # BLD AUTO: 0.8 10E9/L (ref 0–1.3)
MONOCYTES NFR BLD AUTO: 11.1 %
NEUTROPHILS # BLD AUTO: 4.6 10E9/L (ref 1.6–8.3)
NEUTROPHILS NFR BLD AUTO: 67.8 %
NRBC # BLD AUTO: 0 10*3/UL
NRBC BLD AUTO-RTO: 0 /100
PLATELET # BLD AUTO: 230 10E9/L (ref 150–450)
RBC # BLD AUTO: 4.05 10E12/L (ref 3.8–5.2)
WBC # BLD AUTO: 6.8 10E9/L (ref 4–11)

## 2021-01-05 PROCEDURE — 250N000011 HC RX IP 250 OP 636: Performed by: NURSE PRACTITIONER

## 2021-01-05 PROCEDURE — 250N000013 HC RX MED GY IP 250 OP 250 PS 637: Mod: GY | Performed by: NURSE PRACTITIONER

## 2021-01-05 PROCEDURE — 96365 THER/PROPH/DIAG IV INF INIT: CPT

## 2021-01-05 PROCEDURE — 96401 CHEMO ANTI-NEOPL SQ/IM: CPT

## 2021-01-05 PROCEDURE — 85025 COMPLETE CBC W/AUTO DIFF WBC: CPT | Mod: ZL | Performed by: NURSE PRACTITIONER

## 2021-01-05 PROCEDURE — 258N000003 HC RX IP 258 OP 636: Performed by: NURSE PRACTITIONER

## 2021-01-05 PROCEDURE — 36415 COLL VENOUS BLD VENIPUNCTURE: CPT | Mod: ZL | Performed by: NURSE PRACTITIONER

## 2021-01-05 RX ORDER — DIPHENHYDRAMINE HCL 50 MG
50 CAPSULE ORAL ONCE
Status: COMPLETED | OUTPATIENT
Start: 2021-01-05 | End: 2021-01-05

## 2021-01-05 RX ORDER — ACETAMINOPHEN 325 MG/1
650 TABLET ORAL ONCE
Status: COMPLETED | OUTPATIENT
Start: 2021-01-05 | End: 2021-01-05

## 2021-01-05 RX ADMIN — DEXAMETHASONE SODIUM PHOSPHATE 12 MG: 10 INJECTION INTRAMUSCULAR; INTRAVENOUS at 13:36

## 2021-01-05 RX ADMIN — SODIUM CHLORIDE 250 ML: 9 INJECTION, SOLUTION INTRAVENOUS at 13:15

## 2021-01-05 RX ADMIN — DARATUMUMAB AND HYALURONIDASE-FIHJ (HUMAN RECOMBINANT) 1800 MG: 1800; 30000 INJECTION SUBCUTANEOUS at 14:06

## 2021-01-05 RX ADMIN — ACETAMINOPHEN 650 MG: 325 TABLET, FILM COATED ORAL at 13:16

## 2021-01-05 RX ADMIN — DIPHENHYDRAMINE HYDROCHLORIDE 50 MG: 50 CAPSULE ORAL at 13:16

## 2021-01-05 ASSESSMENT — PAIN SCALES - GENERAL: PAINLEVEL: NO PAIN (0)

## 2021-01-05 ASSESSMENT — MIFFLIN-ST. JEOR: SCORE: 1240

## 2021-01-05 NOTE — PROGRESS NOTES
"Occasionally shaky, \"just a little off, enough to notice it.\" Eats and goes away, lasts 5-10min. Has history of elevated BG but no diagnosis of diabetes. Spoke with Bonita Quintana NP and updated. She noted it could likely be from adrenal glands being affected by previously high dose dexamethasone. She asked that patient continue doing what she is doing since she is able to manage it, and A1C to be checked with next ONC provider visit. Updated patient on all, education on when to eat to help sugar stay more level during day/at night.  She noted she is seeing her PCP JULIANA Trujillo on the 18th. Order placed for A1C and note to PCP alerting.   "

## 2021-01-05 NOTE — PROGRESS NOTES
Patient is a 70 year old  here today accompanied by self for injection of FASPRO per order of Dr Walker.     Today's labs within parameters to administer injection; today's lab values are WBC  6.8, HGB  12.9, PLT  230, ANC  4.6.     Patient denies questions nor concerns regarding medication, administration site, side effects, nor aftercare.  Patient identified with two identifiers, order verified, and verbal consent for today's injection obtained from patient.   Patient education provided on s/s of injection site infection, and/or medication specific side effects, and when to call a provider.  Patient instructed to report any adverse effects.     Medication administered per protocol in left lower abd, approx 3in from umbilicus, at 45 degree angle.  Site covered with sterile bandage.  Patient tolerated injection well, no verbal nor non-verbal signs of discomfort noted.  No adverse effects noted at this time.     Patient instructed to call with further questions or concerns.  Patient states understanding and is in agreement with this plan.  Copy of appointments, and after visit summary (AVS) given to patient. Patient discharged.

## 2021-01-08 ENCOUNTER — DOCUMENTATION ONLY (OUTPATIENT)
Dept: ONCOLOGY | Facility: OTHER | Age: 71
End: 2021-01-08

## 2021-01-12 ENCOUNTER — INFUSION THERAPY VISIT (OUTPATIENT)
Dept: INFUSION THERAPY | Facility: OTHER | Age: 71
End: 2021-01-12
Attending: INTERNAL MEDICINE
Payer: MEDICARE

## 2021-01-12 VITALS
DIASTOLIC BLOOD PRESSURE: 74 MMHG | WEIGHT: 167.77 LBS | RESPIRATION RATE: 16 BRPM | SYSTOLIC BLOOD PRESSURE: 109 MMHG | HEIGHT: 63 IN | TEMPERATURE: 98.8 F | BODY MASS INDEX: 29.73 KG/M2 | HEART RATE: 93 BPM | OXYGEN SATURATION: 95 %

## 2021-01-12 DIAGNOSIS — C90.00 MULTIPLE MYELOMA NOT HAVING ACHIEVED REMISSION (H): Primary | ICD-10-CM

## 2021-01-12 LAB
BASOPHILS # BLD AUTO: 0 10E9/L (ref 0–0.2)
BASOPHILS NFR BLD AUTO: 0.2 %
DIFFERENTIAL METHOD BLD: NORMAL
EOSINOPHIL # BLD AUTO: 0.1 10E9/L (ref 0–0.7)
EOSINOPHIL NFR BLD AUTO: 2 %
ERYTHROCYTE [DISTWIDTH] IN BLOOD BY AUTOMATED COUNT: 13.6 % (ref 10–15)
HCT VFR BLD AUTO: 38.7 % (ref 35–47)
HGB BLD-MCNC: 13.2 G/DL (ref 11.7–15.7)
IMM GRANULOCYTES # BLD: 0 10E9/L (ref 0–0.4)
IMM GRANULOCYTES NFR BLD: 0.5 %
LYMPHOCYTES # BLD AUTO: 1.3 10E9/L (ref 0.8–5.3)
LYMPHOCYTES NFR BLD AUTO: 20.8 %
MCH RBC QN AUTO: 32.2 PG (ref 26.5–33)
MCHC RBC AUTO-ENTMCNC: 34.1 G/DL (ref 31.5–36.5)
MCV RBC AUTO: 94 FL (ref 78–100)
MONOCYTES # BLD AUTO: 0.8 10E9/L (ref 0–1.3)
MONOCYTES NFR BLD AUTO: 12.1 %
NEUTROPHILS # BLD AUTO: 4.1 10E9/L (ref 1.6–8.3)
NEUTROPHILS NFR BLD AUTO: 64.4 %
NRBC # BLD AUTO: 0 10*3/UL
NRBC BLD AUTO-RTO: 0 /100
PLATELET # BLD AUTO: 251 10E9/L (ref 150–450)
RBC # BLD AUTO: 4.1 10E12/L (ref 3.8–5.2)
WBC # BLD AUTO: 6.4 10E9/L (ref 4–11)

## 2021-01-12 PROCEDURE — 85025 COMPLETE CBC W/AUTO DIFF WBC: CPT | Mod: ZL | Performed by: NURSE PRACTITIONER

## 2021-01-12 PROCEDURE — 250N000013 HC RX MED GY IP 250 OP 250 PS 637: Mod: GY | Performed by: NURSE PRACTITIONER

## 2021-01-12 PROCEDURE — 250N000011 HC RX IP 250 OP 636: Performed by: NURSE PRACTITIONER

## 2021-01-12 PROCEDURE — 250N000012 HC RX MED GY IP 250 OP 636 PS 637: Performed by: INTERNAL MEDICINE

## 2021-01-12 PROCEDURE — 36415 COLL VENOUS BLD VENIPUNCTURE: CPT | Mod: ZL | Performed by: NURSE PRACTITIONER

## 2021-01-12 PROCEDURE — 96401 CHEMO ANTI-NEOPL SQ/IM: CPT

## 2021-01-12 RX ORDER — ACETAMINOPHEN 325 MG/1
650 TABLET ORAL ONCE
Status: COMPLETED | OUTPATIENT
Start: 2021-01-12 | End: 2021-01-12

## 2021-01-12 RX ORDER — DEXAMETHASONE 4 MG/1
12 TABLET ORAL ONCE
Status: COMPLETED | OUTPATIENT
Start: 2021-01-12 | End: 2021-01-12

## 2021-01-12 RX ORDER — DIPHENHYDRAMINE HCL 50 MG
50 CAPSULE ORAL ONCE
Status: COMPLETED | OUTPATIENT
Start: 2021-01-12 | End: 2021-01-12

## 2021-01-12 RX ADMIN — DEXAMETHASONE 12 MG: 4 TABLET ORAL at 13:36

## 2021-01-12 RX ADMIN — ACETAMINOPHEN 650 MG: 325 TABLET, FILM COATED ORAL at 13:36

## 2021-01-12 RX ADMIN — DIPHENHYDRAMINE HYDROCHLORIDE 50 MG: 50 CAPSULE ORAL at 13:36

## 2021-01-12 RX ADMIN — DARATUMUMAB AND HYALURONIDASE-FIHJ (HUMAN RECOMBINANT) 1800 MG: 1800; 30000 INJECTION SUBCUTANEOUS at 14:06

## 2021-01-12 ASSESSMENT — MIFFLIN-ST. JEOR: SCORE: 1240.13

## 2021-01-12 ASSESSMENT — PAIN SCALES - GENERAL: PAINLEVEL: NO PAIN (0)

## 2021-01-12 NOTE — PROGRESS NOTES
Patient is a 70 year old female here today accompanied by self for injection of Faspro per order of Dr. Walker.     Labs within parameters to administer injection; today's lab values are ANC: 4.1 PLT: 251.     Patient denies questions nor concerns regarding medication, administration site, side effects, nor aftercare.  Patient identified with two identifiers, order verified, and verbal consent for today's injection obtained from patient.   Patient education provided on s/s of injection site infection, and/or medication specific side effects, and when to call a provider.  Patient instructed to report any adverse effects.     Medication administered per protocol in right upper outer quardrant at 45 degree angle.  Site covered with sterile bandage.  Patient tolerated injection well, no verbal nor non-verbal signs of discomfort noted.  No adverse effects noted at this time.     Patient instructed to call with further questions or concerns.  Patient states understanding and is in agreement with this plan.  Copy of appointments, and after visit summary (AVS) given to patient. Patient discharged.

## 2021-01-15 NOTE — PROGRESS NOTES
Subjective     Carmelita Watts is a 70 year old female who presents to clinic today for the following health issues:    HPI         Concern - Follow up on chemo   Onset: ongoing  Description: ongoing  Intensity: mild  Progression of Symptoms:  same  Accompanying Signs & Symptoms: chemo;no nausea   Previous history of similar problem: none  Precipitating factors:        Worsened by: none  Alleviating factors:        Improved by: none  Therapies tried and outcome: None    Depression Followup    How are you doing with your depression since  last visit? Improved has grand daughter living     Are you having other symptoms that might be associated with depression? No    Have you had a significant life event?  Relationship Concerns     Are you feeling anxious or having panic attacks?   No    Do you have any concerns with your use of alcohol or other drugs? No    Social History     Tobacco Use     Smoking status: Never Smoker     Smokeless tobacco: Never Used     Tobacco comment: Tried to Quit (YES); QUIT in 1971; Passive Exposure (NO)   Substance Use Topics     Alcohol use: Yes     Comment: RARELY     Drug use: No     PHQ 12/20/2019 5/27/2020 10/16/2020   PHQ-9 Total Score 2 0 4   Q9: Thoughts of better off dead/self-harm past 2 weeks Not at all Not at all Not at all     MINAL-7 SCORE 11/14/2019 5/27/2020 10/16/2020   Total Score 3 0 1     Last PHQ-9 10/16/2020   1.  Little interest or pleasure in doing things 1   2.  Feeling down, depressed, or hopeless 0   3.  Trouble falling or staying asleep, or sleeping too much 1   4.  Feeling tired or having little energy 1   5.  Poor appetite or overeating 1   6.  Feeling bad about yourself 0   7.  Trouble concentrating 0   8.  Moving slowly or restless 0   Q9: Thoughts of better off dead/self-harm past 2 weeks 0   PHQ-9 Total Score 4   Difficulty at work, home, or with people -     MINAL-7  10/16/2020   1. Feeling nervous, anxious, or on edge 0   2. Not being able to stop or control  "worrying 0   3. Worrying too much about different things 1   4. Trouble relaxing 0   5. Being so restless that it is hard to sit still 0   6. Becoming easily annoyed or irritable 0   7. Feeling afraid, as if something awful might happen 0   MINAL-7 Total Score 1   If you checked any problems, how difficult have they made it for you to do your work, take care of things at home, or get along with other people? -         Suicide Assessment Five-step Evaluation and Treatment (SAFE-T)      Patient Active Problem List   Diagnosis     Hyperlipidemia LDL goal <130     Hypertension goal BP (blood pressure) < 140/80     Advanced care planning/counseling discussion     Major depressive disorder, recurrent episode, mild (H)     Seasonal allergies     Mixed hyperlipidemia     ACP (advance care planning)     Multiple myeloma not having achieved remission (H)     Diabetes mellitus, type 2 (H)     Past Surgical History:   Procedure Laterality Date     APPENDECTOMY       BONE MARROW BIOPSY, BONE SPECIMEN, NEEDLE/TROCAR N/A 6/18/2019    Procedure: BONE MARROW BIOPSY;  Surgeon: Maciej Sanz MD;  Location: [facility] OR     CHOLECYSTECTOMY       COLONOSCOPY  07-    repeat 10 years     COLONOSCOPY N/A 12/30/2016    Procedure: COLONOSCOPY;  Surgeon: Bhaskar Franklin DO;  Location: [facility] OR     SINUS SURGERY       TUBAL STERILIZATION         Social History     Tobacco Use     Smoking status: Never Smoker     Smokeless tobacco: Never Used     Tobacco comment: Tried to Quit (YES); QUIT in 1971; Passive Exposure (NO)   Substance Use Topics     Alcohol use: Yes     Comment: RARELY     Family History   Problem Relation Age of Onset     Breast Cancer Mother 66        Cause of Death     Parkinsonism Father         \"Possible\"     Coronary Artery Disease Father      Pacemaker Father      Thyroid Disease Daughter      Diabetes No family hx of      Hypertension No family hx of      Hyperlipidemia No family hx of      Cerebrovascular Disease No " family hx of      Colon Cancer No family hx of      Prostate Cancer No family hx of      Genetic Disorder No family hx of      Asthma No family hx of      Anesthesia Reaction No family hx of          Current Outpatient Medications   Medication Sig Dispense Refill     acyclovir (ZOVIRAX) 400 MG tablet Take 1 tablet (400 mg) by mouth 2 times daily Start 1 week prior to daratumumab and continue for 3 months after treatment is complete. 60 tablet 11     acyclovir (ZOVIRAX) 400 MG tablet Take 1 tablet (400 mg) by mouth 2 times daily Viral Prophylaxis. 60 tablet 3     aspirin (ASA) 81 MG chewable tablet Take 81 mg by mouth daily       atorvastatin (LIPITOR) 10 MG tablet Take 1 tablet (10 mg) by mouth daily 90 tablet 3     dexamethasone (DECADRON) 4 MG tablet Take 1 tablet (4 mg) by mouth daily (with breakfast) for 2 days following each daratumumab infusion 8 tablet 4     EPINEPHrine (EPIPEN) 0.3 MG/0.3ML injection Inject 0.3 mg into the muscle once as needed. Use as needed for anaphylaxis.       fluticasone (FLONASE) 50 MCG/ACT nasal spray SPRAY 2 SPRAYS INTO BOTH NOSTRILS DAILY 48 mL 0     losartan (COZAAR) 50 MG tablet Take 1 tablet (50 mg) by mouth daily 90 tablet 3     sertraline (ZOLOFT) 50 MG tablet Take 50 mg by mouth daily  45 tablet 3     prochlorperazine (COMPAZINE) 10 MG tablet Take 1 tablet (10 mg) by mouth every 6 hours as needed (Nausea/Vomiting) (Patient not taking: Reported on 12/29/2020) 30 tablet 3     Allergies   Allergen Reactions     Lisinopril Cough     Phenylephrine Hcl Other (See Comments)     **Entex  HEADACHE (SEVERE)     Phenylpropanolamine Other (See Comments)     **Entex  HEADACHE (SEVERE)     Pseudoephedrine Tannate Other (See Comments)     **Entex  HEADACHE (SEVERE)     Levofloxacin Rash     **Levaquin     Moxifloxacin Hcl [Avelox] Rash     Recent Labs   Lab Test 12/29/20  0900 11/10/20  1318 10/27/20  1034 10/19/20  1125 09/30/20  1255 02/14/20  0947 02/14/20  0947 01/03/19  1043  "01/03/19  1043 11/17/17  0844 11/17/17  0844   A1C  --   --   --  6.5*  --   --   --   --   --   --   --    LDL  --   --   --   --   --   --  125*  --  86  --  108*   HDL  --   --   --   --   --   --  44*  --  42*  --  60   TRIG  --   --   --   --   --   --  270*  --  126  --  78   ALT 20  --  20  --  20   < >  --    < >  --    < > 27   CR 0.70 0.71 0.67  --  0.72   < >  --    < >  --    < > 0.69   GFRESTIMATED 87 85 88  --  84   < >  --    < >  --    < > 84   GFRESTBLACK >90 >90 >90  --  >90   < >  --    < >  --    < > >90   POTASSIUM 4.0  --  4.2  --  3.6   < >  --    < >  --    < > 4.1   TSH  --   --   --   --   --   --  1.86  --   --   --  1.77    < > = values in this interval not displayed.      BP Readings from Last 3 Encounters:   01/18/21 110/70   01/12/21 109/74   01/05/21 120/70    Wt Readings from Last 3 Encounters:   01/18/21 76.3 kg (168 lb 3.2 oz)   01/12/21 76.1 kg (167 lb 12.3 oz)   01/05/21 76.1 kg (167 lb 12.3 oz)                    Reviewed and updated as needed this visit by Provider                 Review of Systems   Constitutional, HEENT, cardiovascular, pulmonary, gi and gu systems are negative, except as otherwise noted.      Objective    /70 (BP Location: Right arm, Patient Position: Sitting, Cuff Size: Adult Regular)   Pulse 79   Temp 99  F (37.2  C) (Tympanic)   Ht 1.6 m (5' 3\")   Wt 76.3 kg (168 lb 3.2 oz)   SpO2 98%   BMI 29.80 kg/m    Body mass index is 29.8 kg/m .  Physical Exam   GENERAL: healthy, alert and no distress  EYES: Eyes grossly normal to inspection, PERRL and conjunctivae and sclerae normal  HENT: ear canals and TM's normal, nose and mouth without ulcers or lesions  NECK: no adenopathy, no asymmetry, masses, or scars and thyroid normal to palpation  RESP: lungs clear to auscultation - no rales, rhonchi or wheezes  CV: regular rate and rhythm, normal S1 S2, no S3 or S4, no murmur, click or rub, no peripheral edema and peripheral pulses strong  MS: no gross " "musculoskeletal defects noted, no edema  SKIN: no suspicious lesions or rashes  NEURO: Normal strength and tone, mentation intact and speech normal  PSYCH: mentation appears normal, affect normal/bright    Diagnostic Test Results:  Labs reviewed in Epic  No results found for this or any previous visit (from the past 24 hour(s)).        Assessment & Plan     Type 2 diabetes mellitus without complication, without long-term current use of insulin (H)  She has had borderline blood sugars . Last A1C was elevated. Watching diet. Eye exams are up to date. On Chemo and so we are watching her clotting.     Multiple myeloma not having achieved remission (H)  She is going to be continued on new Chemo reviewed her IGg and IGm and showing steady downward trend.      Major depressive disorder, recurrent episode, mild (H)  Her mood is really good today. Feels the grandaughter living with her .  This has really helped her mood since her   last November from pancreatic cancer.   She feels more secure back into painting.        BMI:   Estimated body mass index is 29.8 kg/m  as calculated from the following:    Height as of this encounter: 1.6 m (5' 3\").    Weight as of this encounter: 76.3 kg (168 lb 3.2 oz).          60 minutes  See Patient Instructions    No follow-ups on file.    VENKATESH Trevino  [facility] [place]  "

## 2021-01-18 ENCOUNTER — OFFICE VISIT (OUTPATIENT)
Dept: FAMILY MEDICINE | Facility: OTHER | Age: 71
End: 2021-01-18
Attending: PHYSICIAN ASSISTANT
Payer: COMMERCIAL

## 2021-01-18 VITALS
TEMPERATURE: 99 F | WEIGHT: 168.2 LBS | HEIGHT: 63 IN | SYSTOLIC BLOOD PRESSURE: 110 MMHG | BODY MASS INDEX: 29.8 KG/M2 | OXYGEN SATURATION: 98 % | HEART RATE: 79 BPM | DIASTOLIC BLOOD PRESSURE: 70 MMHG

## 2021-01-18 DIAGNOSIS — F33.0 MAJOR DEPRESSIVE DISORDER, RECURRENT EPISODE, MILD (H): ICD-10-CM

## 2021-01-18 DIAGNOSIS — E11.9 TYPE 2 DIABETES MELLITUS WITHOUT COMPLICATION, WITHOUT LONG-TERM CURRENT USE OF INSULIN (H): ICD-10-CM

## 2021-01-18 DIAGNOSIS — C90.00 MULTIPLE MYELOMA NOT HAVING ACHIEVED REMISSION (H): ICD-10-CM

## 2021-01-18 PROCEDURE — G0463 HOSPITAL OUTPT CLINIC VISIT: HCPCS

## 2021-01-18 PROCEDURE — 99214 OFFICE O/P EST MOD 30 MIN: CPT | Performed by: PHYSICIAN ASSISTANT

## 2021-01-18 ASSESSMENT — PAIN SCALES - GENERAL: PAINLEVEL: NO PAIN (0)

## 2021-01-18 ASSESSMENT — MIFFLIN-ST. JEOR: SCORE: 1242.08

## 2021-01-18 NOTE — NURSING NOTE
"Chief Complaint   Patient presents with     Medication Follow-up       Initial /70 (BP Location: Right arm, Patient Position: Sitting, Cuff Size: Adult Regular)   Pulse 79   Temp 99  F (37.2  C) (Tympanic)   Ht 1.6 m (5' 3\")   Wt 76.3 kg (168 lb 3.2 oz)   SpO2 98%   BMI 29.80 kg/m   Estimated body mass index is 29.8 kg/m  as calculated from the following:    Height as of this encounter: 1.6 m (5' 3\").    Weight as of this encounter: 76.3 kg (168 lb 3.2 oz).  Medication Reconciliation: complete  Marlyn Loo LPN  "

## 2021-01-19 ENCOUNTER — INFUSION THERAPY VISIT (OUTPATIENT)
Dept: INFUSION THERAPY | Facility: OTHER | Age: 71
End: 2021-01-19
Attending: INTERNAL MEDICINE
Payer: MEDICARE

## 2021-01-19 VITALS
HEIGHT: 63 IN | TEMPERATURE: 98.8 F | WEIGHT: 169.53 LBS | HEART RATE: 86 BPM | DIASTOLIC BLOOD PRESSURE: 84 MMHG | RESPIRATION RATE: 18 BRPM | SYSTOLIC BLOOD PRESSURE: 143 MMHG | OXYGEN SATURATION: 97 % | BODY MASS INDEX: 30.04 KG/M2

## 2021-01-19 DIAGNOSIS — C90.00 MULTIPLE MYELOMA NOT HAVING ACHIEVED REMISSION (H): Primary | ICD-10-CM

## 2021-01-19 LAB
BASOPHILS # BLD AUTO: 0 10E9/L (ref 0–0.2)
BASOPHILS NFR BLD AUTO: 0.3 %
DIFFERENTIAL METHOD BLD: NORMAL
EOSINOPHIL # BLD AUTO: 0.1 10E9/L (ref 0–0.7)
EOSINOPHIL NFR BLD AUTO: 1.9 %
ERYTHROCYTE [DISTWIDTH] IN BLOOD BY AUTOMATED COUNT: 13.7 % (ref 10–15)
HCT VFR BLD AUTO: 38.9 % (ref 35–47)
HGB BLD-MCNC: 13.3 G/DL (ref 11.7–15.7)
IMM GRANULOCYTES # BLD: 0.1 10E9/L (ref 0–0.4)
IMM GRANULOCYTES NFR BLD: 1.1 %
LYMPHOCYTES # BLD AUTO: 1.4 10E9/L (ref 0.8–5.3)
LYMPHOCYTES NFR BLD AUTO: 22.4 %
MCH RBC QN AUTO: 32.1 PG (ref 26.5–33)
MCHC RBC AUTO-ENTMCNC: 34.2 G/DL (ref 31.5–36.5)
MCV RBC AUTO: 94 FL (ref 78–100)
MONOCYTES # BLD AUTO: 0.7 10E9/L (ref 0–1.3)
MONOCYTES NFR BLD AUTO: 10.7 %
NEUTROPHILS # BLD AUTO: 4 10E9/L (ref 1.6–8.3)
NEUTROPHILS NFR BLD AUTO: 63.6 %
NRBC # BLD AUTO: 0 10*3/UL
NRBC BLD AUTO-RTO: 0 /100
PLATELET # BLD AUTO: 222 10E9/L (ref 150–450)
PROT SERPL-MCNC: 7.2 G/DL (ref 6.8–8.8)
RBC # BLD AUTO: 4.14 10E12/L (ref 3.8–5.2)
WBC # BLD AUTO: 6.2 10E9/L (ref 4–11)

## 2021-01-19 PROCEDURE — 82232 ASSAY OF BETA-2 PROTEIN: CPT | Mod: ZL | Performed by: NURSE PRACTITIONER

## 2021-01-19 PROCEDURE — 999N001036 HC STATISTIC TOTAL PROTEIN: Mod: ZL | Performed by: NURSE PRACTITIONER

## 2021-01-19 PROCEDURE — 86334 IMMUNOFIX E-PHORESIS SERUM: CPT | Mod: TC,ZL | Performed by: NURSE PRACTITIONER

## 2021-01-19 PROCEDURE — 250N000013 HC RX MED GY IP 250 OP 250 PS 637: Performed by: NURSE PRACTITIONER

## 2021-01-19 PROCEDURE — 82784 ASSAY IGA/IGD/IGG/IGM EACH: CPT | Mod: ZL | Performed by: NURSE PRACTITIONER

## 2021-01-19 PROCEDURE — 85025 COMPLETE CBC W/AUTO DIFF WBC: CPT | Mod: ZL | Performed by: NURSE PRACTITIONER

## 2021-01-19 PROCEDURE — 250N000012 HC RX MED GY IP 250 OP 636 PS 637: Performed by: INTERNAL MEDICINE

## 2021-01-19 PROCEDURE — 85810 BLOOD VISCOSITY EXAMINATION: CPT | Mod: ZL | Performed by: NURSE PRACTITIONER

## 2021-01-19 PROCEDURE — 36415 COLL VENOUS BLD VENIPUNCTURE: CPT | Mod: ZL | Performed by: NURSE PRACTITIONER

## 2021-01-19 PROCEDURE — 84155 ASSAY OF PROTEIN SERUM: CPT | Mod: ZL | Performed by: NURSE PRACTITIONER

## 2021-01-19 PROCEDURE — 84165 PROTEIN E-PHORESIS SERUM: CPT | Mod: TC,ZL | Performed by: NURSE PRACTITIONER

## 2021-01-19 PROCEDURE — 250N000011 HC RX IP 250 OP 636: Performed by: NURSE PRACTITIONER

## 2021-01-19 PROCEDURE — 86335 IMMUNFIX E-PHORSIS/URINE/CSF: CPT | Mod: TC,ZL | Performed by: INTERNAL MEDICINE

## 2021-01-19 PROCEDURE — 96401 CHEMO ANTI-NEOPL SQ/IM: CPT

## 2021-01-19 RX ORDER — DIPHENHYDRAMINE HCL 50 MG
50 CAPSULE ORAL ONCE
Status: COMPLETED | OUTPATIENT
Start: 2021-01-19 | End: 2021-01-19

## 2021-01-19 RX ORDER — ACETAMINOPHEN 325 MG/1
650 TABLET ORAL ONCE
Status: COMPLETED | OUTPATIENT
Start: 2021-01-19 | End: 2021-01-19

## 2021-01-19 RX ORDER — DEXAMETHASONE 4 MG/1
12 TABLET ORAL ONCE
Status: COMPLETED | OUTPATIENT
Start: 2021-01-19 | End: 2021-01-19

## 2021-01-19 RX ADMIN — DIPHENHYDRAMINE HYDROCHLORIDE 50 MG: 50 CAPSULE ORAL at 13:31

## 2021-01-19 RX ADMIN — ACETAMINOPHEN 650 MG: 325 TABLET, FILM COATED ORAL at 13:31

## 2021-01-19 RX ADMIN — DEXAMETHASONE 12 MG: 4 TABLET ORAL at 13:42

## 2021-01-19 RX ADMIN — DARATUMUMAB AND HYALURONIDASE-FIHJ (HUMAN RECOMBINANT) 1800 MG: 1800; 30000 INJECTION SUBCUTANEOUS at 14:12

## 2021-01-19 ASSESSMENT — MIFFLIN-ST. JEOR: SCORE: 1248

## 2021-01-19 ASSESSMENT — PAIN SCALES - GENERAL: PAINLEVEL: NO PAIN (0)

## 2021-01-19 NOTE — PROGRESS NOTES
Peripheral labs ordered. Butterfly needle inserted into RT arm.  Immediate blood return noted. 3 lab tubes drawn. Needle removed, covered with sterile guaze and coban. Patient tolerated well, denies pain or discomfort at this time. Patient discharged.

## 2021-01-19 NOTE — PROGRESS NOTES
Patient is a 70 year old female here accompanied by self today for injection of FASPRO under the orders of Dr. Walker.      Today's lab values: WBC 6.2, ANC 4.0, , HGB 13.3    Patient meets parameters for today's infusion.  Denies questions or concerns regarding today's infusion and/or medications being administered.      Patient identified with two identifiers, order verified, and verbal consent for today's infusion obtained from patient. Written consent for treatment is on file and valid.    Patient education provided on s/s of injection site infection, and/or medication specific side effects, and when to call a provider.  Patient instructed to report any adverse effects.     Medication administered per protocol in left upper outer quadrant, 3 inches from navel per instructions, at 45 degree angle.  Site covered with sterile bandage.  Patient tolerated injection well, no verbal nor non-verbal signs of discomfort noted.  No adverse effects noted at this time.     TORB from Dr Walker with ok to remove IV hydration from FASPRO plan, as it is no longer needed. Removed.     Patient instructed to call with further questions or concerns.  Patient states understanding and is in agreement with this plan. Patient discharged.

## 2021-01-21 LAB
ALBUMIN SERPL ELPH-MCNC: 4 G/DL (ref 3.7–5.1)
ALPHA1 GLOB SERPL ELPH-MCNC: 0.3 G/DL (ref 0.2–0.4)
ALPHA2 GLOB SERPL ELPH-MCNC: 0.8 G/DL (ref 0.5–0.9)
B-GLOBULIN SERPL ELPH-MCNC: 0.6 G/DL (ref 0.6–1)
B2 MICROGLOB SERPL-MCNC: 2.5 MG/L
GAMMA GLOB SERPL ELPH-MCNC: 1.5 G/DL (ref 0.7–1.6)
IGA SERPL-MCNC: 9 MG/DL (ref 84–499)
IGG SERPL-MCNC: 1697 MG/DL (ref 610–1616)
IGM SERPL-MCNC: <10 MG/DL (ref 35–242)
M PROTEIN SERPL ELPH-MCNC: 1 G/DL
PROT ELPH PNL UR ELPH: NORMAL
PROT PATTERN SERPL ELPH-IMP: ABNORMAL
PROT PATTERN SERPL IFE-IMP: ABNORMAL
VISC SER: 1.6 CP (ref 1.4–1.8)

## 2021-01-26 ENCOUNTER — INFUSION THERAPY VISIT (OUTPATIENT)
Dept: INFUSION THERAPY | Facility: OTHER | Age: 71
End: 2021-01-26
Attending: INTERNAL MEDICINE
Payer: MEDICARE

## 2021-01-26 ENCOUNTER — ONCOLOGY VISIT (OUTPATIENT)
Dept: ONCOLOGY | Facility: OTHER | Age: 71
End: 2021-01-26
Attending: NURSE PRACTITIONER
Payer: MEDICARE

## 2021-01-26 VITALS
RESPIRATION RATE: 18 BRPM | BODY MASS INDEX: 29.61 KG/M2 | TEMPERATURE: 98.7 F | WEIGHT: 167.11 LBS | HEART RATE: 81 BPM | SYSTOLIC BLOOD PRESSURE: 100 MMHG | DIASTOLIC BLOOD PRESSURE: 60 MMHG | OXYGEN SATURATION: 99 % | HEIGHT: 63 IN

## 2021-01-26 VITALS
HEART RATE: 81 BPM | SYSTOLIC BLOOD PRESSURE: 100 MMHG | TEMPERATURE: 98.7 F | DIASTOLIC BLOOD PRESSURE: 60 MMHG | RESPIRATION RATE: 18 BRPM | BODY MASS INDEX: 29.61 KG/M2 | HEIGHT: 63 IN | WEIGHT: 167.11 LBS | OXYGEN SATURATION: 99 %

## 2021-01-26 DIAGNOSIS — C90.00 MULTIPLE MYELOMA NOT HAVING ACHIEVED REMISSION (H): ICD-10-CM

## 2021-01-26 DIAGNOSIS — Z79.899 ENCOUNTER FOR LONG-TERM (CURRENT) USE OF HIGH-RISK MEDICATION: ICD-10-CM

## 2021-01-26 DIAGNOSIS — C90.00 MULTIPLE MYELOMA NOT HAVING ACHIEVED REMISSION (H): Primary | ICD-10-CM

## 2021-01-26 LAB
ALBUMIN SERPL-MCNC: 3.5 G/DL (ref 3.4–5)
ALP SERPL-CCNC: 113 U/L (ref 40–150)
ALT SERPL W P-5'-P-CCNC: 20 U/L (ref 0–50)
ANION GAP SERPL CALCULATED.3IONS-SCNC: 4 MMOL/L (ref 3–14)
AST SERPL W P-5'-P-CCNC: 12 U/L (ref 0–45)
BASOPHILS # BLD AUTO: 0 10E9/L (ref 0–0.2)
BASOPHILS NFR BLD AUTO: 0.4 %
BILIRUB SERPL-MCNC: 0.5 MG/DL (ref 0.2–1.3)
BUN SERPL-MCNC: 14 MG/DL (ref 7–30)
CALCIUM SERPL-MCNC: 9.2 MG/DL (ref 8.5–10.1)
CHLORIDE SERPL-SCNC: 104 MMOL/L (ref 94–109)
CO2 SERPL-SCNC: 27 MMOL/L (ref 20–32)
CREAT SERPL-MCNC: 0.67 MG/DL (ref 0.52–1.04)
DIFFERENTIAL METHOD BLD: NORMAL
EOSINOPHIL # BLD AUTO: 0.1 10E9/L (ref 0–0.7)
EOSINOPHIL NFR BLD AUTO: 2.4 %
ERYTHROCYTE [DISTWIDTH] IN BLOOD BY AUTOMATED COUNT: 13.6 % (ref 10–15)
EST. AVERAGE GLUCOSE BLD GHB EST-MCNC: 134 MG/DL
GFR SERPL CREATININE-BSD FRML MDRD: 88 ML/MIN/{1.73_M2}
GLUCOSE SERPL-MCNC: 109 MG/DL (ref 70–99)
HBA1C MFR BLD: 6.3 % (ref 0–5.6)
HCT VFR BLD AUTO: 39.3 % (ref 35–47)
HGB BLD-MCNC: 13.3 G/DL (ref 11.7–15.7)
IMM GRANULOCYTES # BLD: 0.1 10E9/L (ref 0–0.4)
IMM GRANULOCYTES NFR BLD: 1.1 %
LYMPHOCYTES # BLD AUTO: 1.2 10E9/L (ref 0.8–5.3)
LYMPHOCYTES NFR BLD AUTO: 21.7 %
MCH RBC QN AUTO: 31.7 PG (ref 26.5–33)
MCHC RBC AUTO-ENTMCNC: 33.8 G/DL (ref 31.5–36.5)
MCV RBC AUTO: 94 FL (ref 78–100)
MONOCYTES # BLD AUTO: 0.7 10E9/L (ref 0–1.3)
MONOCYTES NFR BLD AUTO: 12.6 %
NEUTROPHILS # BLD AUTO: 3.4 10E9/L (ref 1.6–8.3)
NEUTROPHILS NFR BLD AUTO: 61.8 %
NRBC # BLD AUTO: 0 10*3/UL
NRBC BLD AUTO-RTO: 0 /100
PLATELET # BLD AUTO: 229 10E9/L (ref 150–450)
POTASSIUM SERPL-SCNC: 3.9 MMOL/L (ref 3.4–5.3)
PROT SERPL-MCNC: 7.6 G/DL (ref 6.8–8.8)
RBC # BLD AUTO: 4.19 10E12/L (ref 3.8–5.2)
SODIUM SERPL-SCNC: 135 MMOL/L (ref 133–144)
WBC # BLD AUTO: 5.5 10E9/L (ref 4–11)

## 2021-01-26 PROCEDURE — 83036 HEMOGLOBIN GLYCOSYLATED A1C: CPT | Mod: ZL | Performed by: NURSE PRACTITIONER

## 2021-01-26 PROCEDURE — 250N000012 HC RX MED GY IP 250 OP 636 PS 637: Mod: GY | Performed by: INTERNAL MEDICINE

## 2021-01-26 PROCEDURE — 999N001182 HC STATISTIC ESTIMATED AVERAGE GLUCOSE: Mod: ZL | Performed by: NURSE PRACTITIONER

## 2021-01-26 PROCEDURE — 250N000013 HC RX MED GY IP 250 OP 250 PS 637: Performed by: INTERNAL MEDICINE

## 2021-01-26 PROCEDURE — 36415 COLL VENOUS BLD VENIPUNCTURE: CPT | Mod: ZL | Performed by: NURSE PRACTITIONER

## 2021-01-26 PROCEDURE — G0463 HOSPITAL OUTPT CLINIC VISIT: HCPCS

## 2021-01-26 PROCEDURE — 83883 ASSAY NEPHELOMETRY NOT SPEC: CPT | Mod: ZL | Performed by: NURSE PRACTITIONER

## 2021-01-26 PROCEDURE — 250N000011 HC RX IP 250 OP 636: Performed by: NURSE PRACTITIONER

## 2021-01-26 PROCEDURE — 36416 COLLJ CAPILLARY BLOOD SPEC: CPT | Mod: ZL | Performed by: NURSE PRACTITIONER

## 2021-01-26 PROCEDURE — 96401 CHEMO ANTI-NEOPL SQ/IM: CPT

## 2021-01-26 PROCEDURE — 80053 COMPREHEN METABOLIC PANEL: CPT | Mod: ZL | Performed by: NURSE PRACTITIONER

## 2021-01-26 PROCEDURE — 85025 COMPLETE CBC W/AUTO DIFF WBC: CPT | Mod: ZL | Performed by: NURSE PRACTITIONER

## 2021-01-26 PROCEDURE — G0463 HOSPITAL OUTPT CLINIC VISIT: HCPCS | Mod: 25

## 2021-01-26 PROCEDURE — 99214 OFFICE O/P EST MOD 30 MIN: CPT | Performed by: NURSE PRACTITIONER

## 2021-01-26 RX ORDER — NALOXONE HYDROCHLORIDE 0.4 MG/ML
.1-.4 INJECTION, SOLUTION INTRAMUSCULAR; INTRAVENOUS; SUBCUTANEOUS
Status: CANCELLED | OUTPATIENT
Start: 2021-01-26

## 2021-01-26 RX ORDER — HEPARIN SODIUM,PORCINE 10 UNIT/ML
5 VIAL (ML) INTRAVENOUS
Status: CANCELLED | OUTPATIENT
Start: 2021-02-09

## 2021-01-26 RX ORDER — ALBUTEROL SULFATE 90 UG/1
1-2 AEROSOL, METERED RESPIRATORY (INHALATION)
Status: CANCELLED
Start: 2021-02-09

## 2021-01-26 RX ORDER — EPINEPHRINE 1 MG/ML
0.3 INJECTION, SOLUTION, CONCENTRATE INTRAVENOUS EVERY 5 MIN PRN
Status: CANCELLED | OUTPATIENT
Start: 2021-01-26

## 2021-01-26 RX ORDER — ACETAMINOPHEN 325 MG/1
650 TABLET ORAL ONCE
Status: COMPLETED | OUTPATIENT
Start: 2021-01-26 | End: 2021-01-26

## 2021-01-26 RX ORDER — EPINEPHRINE 1 MG/ML
0.3 INJECTION, SOLUTION, CONCENTRATE INTRAVENOUS EVERY 5 MIN PRN
Status: CANCELLED | OUTPATIENT
Start: 2021-02-09

## 2021-01-26 RX ORDER — ALBUTEROL SULFATE 0.83 MG/ML
2.5 SOLUTION RESPIRATORY (INHALATION)
Status: CANCELLED | OUTPATIENT
Start: 2021-02-09

## 2021-01-26 RX ORDER — NALOXONE HYDROCHLORIDE 0.4 MG/ML
.1-.4 INJECTION, SOLUTION INTRAMUSCULAR; INTRAVENOUS; SUBCUTANEOUS
Status: CANCELLED | OUTPATIENT
Start: 2021-02-09

## 2021-01-26 RX ORDER — LORAZEPAM 2 MG/ML
0.5 INJECTION INTRAMUSCULAR EVERY 4 HOURS PRN
Status: CANCELLED
Start: 2021-02-09

## 2021-01-26 RX ORDER — ALBUTEROL SULFATE 90 UG/1
1-2 AEROSOL, METERED RESPIRATORY (INHALATION)
Status: CANCELLED
Start: 2021-01-26

## 2021-01-26 RX ORDER — DIPHENHYDRAMINE HYDROCHLORIDE 50 MG/ML
50 INJECTION INTRAMUSCULAR; INTRAVENOUS
Status: CANCELLED
Start: 2021-02-09

## 2021-01-26 RX ORDER — MEPERIDINE HYDROCHLORIDE 25 MG/ML
25 INJECTION INTRAMUSCULAR; INTRAVENOUS; SUBCUTANEOUS EVERY 30 MIN PRN
Status: CANCELLED | OUTPATIENT
Start: 2021-01-26

## 2021-01-26 RX ORDER — DIPHENHYDRAMINE HYDROCHLORIDE 50 MG/ML
50 INJECTION INTRAMUSCULAR; INTRAVENOUS
Status: CANCELLED
Start: 2021-01-26

## 2021-01-26 RX ORDER — ACETAMINOPHEN 325 MG/1
650 TABLET ORAL ONCE
Status: CANCELLED | OUTPATIENT
Start: 2021-02-09

## 2021-01-26 RX ORDER — DEXAMETHASONE 4 MG/1
12 TABLET ORAL ONCE
Status: CANCELLED | OUTPATIENT
Start: 2021-02-09

## 2021-01-26 RX ORDER — HEPARIN SODIUM (PORCINE) LOCK FLUSH IV SOLN 100 UNIT/ML 100 UNIT/ML
5 SOLUTION INTRAVENOUS
Status: CANCELLED | OUTPATIENT
Start: 2021-01-26

## 2021-01-26 RX ORDER — DIPHENHYDRAMINE HCL 50 MG
50 CAPSULE ORAL ONCE
Status: CANCELLED | OUTPATIENT
Start: 2021-02-09

## 2021-01-26 RX ORDER — LORAZEPAM 2 MG/ML
0.5 INJECTION INTRAMUSCULAR EVERY 4 HOURS PRN
Status: CANCELLED
Start: 2021-01-26

## 2021-01-26 RX ORDER — HEPARIN SODIUM (PORCINE) LOCK FLUSH IV SOLN 100 UNIT/ML 100 UNIT/ML
5 SOLUTION INTRAVENOUS
Status: CANCELLED | OUTPATIENT
Start: 2021-02-09

## 2021-01-26 RX ORDER — METHYLPREDNISOLONE SODIUM SUCCINATE 125 MG/2ML
125 INJECTION, POWDER, LYOPHILIZED, FOR SOLUTION INTRAMUSCULAR; INTRAVENOUS
Status: CANCELLED
Start: 2021-02-09

## 2021-01-26 RX ORDER — DIPHENHYDRAMINE HCL 50 MG
50 CAPSULE ORAL ONCE
Status: COMPLETED | OUTPATIENT
Start: 2021-01-26 | End: 2021-01-26

## 2021-01-26 RX ORDER — SODIUM CHLORIDE 9 MG/ML
1000 INJECTION, SOLUTION INTRAVENOUS CONTINUOUS PRN
Status: CANCELLED
Start: 2021-01-26

## 2021-01-26 RX ORDER — MEPERIDINE HYDROCHLORIDE 25 MG/ML
25 INJECTION INTRAMUSCULAR; INTRAVENOUS; SUBCUTANEOUS EVERY 30 MIN PRN
Status: CANCELLED | OUTPATIENT
Start: 2021-02-09

## 2021-01-26 RX ORDER — ALBUTEROL SULFATE 0.83 MG/ML
2.5 SOLUTION RESPIRATORY (INHALATION)
Status: CANCELLED | OUTPATIENT
Start: 2021-01-26

## 2021-01-26 RX ORDER — HEPARIN SODIUM,PORCINE 10 UNIT/ML
5 VIAL (ML) INTRAVENOUS
Status: CANCELLED | OUTPATIENT
Start: 2021-01-26

## 2021-01-26 RX ORDER — DEXAMETHASONE 4 MG/1
12 TABLET ORAL ONCE
Status: COMPLETED | OUTPATIENT
Start: 2021-01-26 | End: 2021-01-26

## 2021-01-26 RX ORDER — SODIUM CHLORIDE 9 MG/ML
1000 INJECTION, SOLUTION INTRAVENOUS CONTINUOUS PRN
Status: CANCELLED
Start: 2021-02-09

## 2021-01-26 RX ORDER — METHYLPREDNISOLONE SODIUM SUCCINATE 125 MG/2ML
125 INJECTION, POWDER, LYOPHILIZED, FOR SOLUTION INTRAMUSCULAR; INTRAVENOUS
Status: CANCELLED
Start: 2021-01-26

## 2021-01-26 RX ADMIN — DEXAMETHASONE 12 MG: 4 TABLET ORAL at 11:07

## 2021-01-26 RX ADMIN — ACETAMINOPHEN 650 MG: 325 TABLET, FILM COATED ORAL at 11:07

## 2021-01-26 RX ADMIN — DIPHENHYDRAMINE HYDROCHLORIDE 50 MG: 50 CAPSULE ORAL at 11:07

## 2021-01-26 RX ADMIN — DARATUMUMAB AND HYALURONIDASE-FIHJ (HUMAN RECOMBINANT) 1800 MG: 1800; 30000 INJECTION SUBCUTANEOUS at 11:44

## 2021-01-26 ASSESSMENT — PAIN SCALES - GENERAL
PAINLEVEL: NO PAIN (0)
PAINLEVEL: NO PAIN (0)

## 2021-01-26 ASSESSMENT — MIFFLIN-ST. JEOR
SCORE: 1237.13
SCORE: 1237

## 2021-01-26 NOTE — PROGRESS NOTES
Oncology Follow-up Visit:  January 26, 2021    Reason for Visit:  Patient presents with:  RECHECK: Follow up Multiple myeloma not having achieved remission      Nursing Note and documentation reviewed: yes    HPI:  This is a 70-year-old female patient who presents to the oncology clinic today for evaluation prior to receiving cycle 3 day 1 therapy for Stage II-RISS IgG multiple myeloma diagnosed June 2019.  She progressed on Velcade and dexamethasone and CyBorD and is now receiving Darzalex faspro injection.    She presents to the clinic today stating she feels she is doing well overall.  She does have issues with dry eyes and uses Systane drops at times.  She feels they are somewhat better being off the Velcade.  She continues to have some days where she feels somewhat low but this is not the majority of the time.  She is trying to do things to keep her self motivated.  She just recently got a cat and she has been starting to work with some art.  Her granddaughter is living with her which she states helps also.    Oncologic History:     12/31/2018   patient presented to the emergency room with vertigo and fatigue.  CT scan of the head was negative and subsequent stress test was negative.  5/3/2019  She was seen by her PCP who ordered lab work and noted a total protein of 12.9.  SPEP at that time showed an M spike of 6.2 with a large monoclonal protein seen in the gamma fraction.  Urine immunofixation showed a possible small protein band in the gamma fraction  5/31/2019  she was evaluated by Dr. Walker with Medical Oncology with plan to rule out myeloma and obtain bone marrow aspiration biopsy as well as a metastatic bone survey along with additional labwork  6/18/2019 she underwent bone marrow aspiration and biopsy  6/24/2019  She was seen again by Dr. Walker and CBC showed a hemoglobin of 9.3, M spike 7.3 with monoclonal IgG immunoglobulin of kappa light chain type; serum viscosity was 2.9; quantitative  immunoglobulins showed an IgG of 8160, beta-2 microglobulin was 5.8, BUN was 21 with creatinine is 0.8 and total protein was 13.7.  Quantitative kappa/lambda free light chains showed an elevated ratio of 17.0 bone marrow aspiration biopsy showed plasma cell myeloma with approximately 80% plasma cells.  Immunofixation showed IgG kappa and flow cytometry revealed kappa monotypic plasma cells consistent with clonal plasma cell neoplasm and FISH panel was pending at that time.  It was felt she had at least stage II disease based on her beta-2 microglobulin and anemia.  Plan was to treat with Velcade 1.3 mg per metered squared days 1, 4, 8 and 11/Decadron 40 mg on days 1, 8 and 15 initiation of Revlimid with the second cycle at 25 mg daily days 1 through 14.  Plan was to also obtain an MRI of the lumbar spine to rule out lytic lesion at L3.  She was initiated on Zovirax 400 mg p.o. twice daily.  6/25/2019  1st cycle of chemotherapy initiated  7/1/2019 note in chart regarding patient's large co-pay for the Revlimid and no plan at this point to initiate Revlimid and treat only with Velcade and Decadron per Dr. Walker  7/11/2019  MRI lumbar spine shows a pathologic superior endplate compression fracture at L3 without evidence of retropulsed fragment and innumerable enhancing lesions throughout the lumbar spine consistent with history of multiple myeloma.  9/10/2019  Increasing M-spike and kappa lambda ratio  10/1/2019  Initiation of CyBorD  12/1/2020  Initiation of Darzalex Faspro injection     Current Chemo Regime/TX:  Darzalex faspro injection 1800mg subcutaneous per protocol      **Zometa 4mg every 3 months  Current Cycle: 3 day 1  # of completed cycles:  2     Previous treatment:   Velcade 1.3 mg/m2 days 1, 4, 8 and 11 with Decadron 40 mg days 1, 8 and 15 x 4 cycles;   Velcade 1.5mg.m2/cyclophosphamide 150mg every 7 days on days 1,8,15 and 22/decadron 40mg days 1,8,15,22     Past Medical History:   Diagnosis Date      "Arthritis      Depressive disorder      Diabetes mellitus, type 2 (H) 1/18/2021     Essential hypertension 10/1/2015     Major depressive disorder, recurrent episode, mild (H) 10/1/2015     Mixed hyperlipidemia 10/1/2015     Multiple myeloma not having achieved remission (H) 6/24/2019     Other specified disorders of bladder 07/09/2012    Irritable Bladder     Seasonal allergies 10/1/2015     Unspecified essential hypertension 03/19/2007     Unspecified sinusitis (chronic) 09/05/2007       Past Surgical History:   Procedure Laterality Date     APPENDECTOMY       BONE MARROW BIOPSY, BONE SPECIMEN, NEEDLE/TROCAR N/A 6/18/2019    Procedure: BONE MARROW BIOPSY;  Surgeon: Maciej Sanz MD;  Location: [facility] OR     CHOLECYSTECTOMY       COLONOSCOPY  07-    repeat 10 years     COLONOSCOPY N/A 12/30/2016    Procedure: COLONOSCOPY;  Surgeon: Bhaskar Franklin DO;  Location: [facility]     SINUS SURGERY       TUBAL STERILIZATION         Family History   Problem Relation Age of Onset     Breast Cancer Mother 66        Cause of Death     Parkinsonism Father         \"Possible\"     Coronary Artery Disease Father      Pacemaker Father      Thyroid Disease Daughter      Diabetes No family hx of      Hypertension No family hx of      Hyperlipidemia No family hx of      Cerebrovascular Disease No family hx of      Colon Cancer No family hx of      Prostate Cancer No family hx of      Genetic Disorder No family hx of      Asthma No family hx of      Anesthesia Reaction No family hx of        Social History     Socioeconomic History     Marital status:      Spouse name: Not on file     Number of children: Not on file     Years of education: Not on file     Highest education level: Not on file   Occupational History     Occupation: Financial     Comment:  - ([employer])   Social Needs     Financial resource strain: Not on file     Food insecurity     Worry: Not on file     Inability: Not on file     Transportation " needs     Medical: Not on file     Non-medical: Not on file   Tobacco Use     Smoking status: Never Smoker     Smokeless tobacco: Never Used     Tobacco comment: Tried to Quit (YES); QUIT in 1971; Passive Exposure (NO)   Substance and Sexual Activity     Alcohol use: Yes     Comment: RARELY     Drug use: No     Sexual activity: Yes     Partners: Male     Birth control/protection: None   Lifestyle     Physical activity     Days per week: Not on file     Minutes per session: Not on file     Stress: Not on file   Relationships     Social connections     Talks on phone: Not on file     Gets together: Not on file     Attends Sikh service: Not on file     Active member of club or organization: Not on file     Attends meetings of clubs or organizations: Not on file     Relationship status: Not on file     Intimate partner violence     Fear of current or ex partner: Not on file     Emotionally abused: Not on file     Physically abused: Not on file     Forced sexual activity: Not on file   Other Topics Concern      Service Not Asked     Blood Transfusions Not Asked     Caffeine Concern Yes     Comment: Coffee >6 cups daily     Occupational Exposure Not Asked     Hobby Hazards Not Asked     Sleep Concern Not Asked     Stress Concern Not Asked     Weight Concern Not Asked     Special Diet Not Asked     Back Care Not Asked     Exercise Not Asked     Bike Helmet Not Asked     Seat Belt Not Asked     Self-Exams Not Asked     Parent/sibling w/ CABG, MI or angioplasty before 65F 55M? No   Social History Narrative     Not on file       Current Outpatient Medications   Medication     acyclovir (ZOVIRAX) 400 MG tablet     aspirin (ASA) 81 MG chewable tablet     atorvastatin (LIPITOR) 10 MG tablet     dexamethasone (DECADRON) 4 MG tablet     fluticasone (FLONASE) 50 MCG/ACT nasal spray     losartan (COZAAR) 50 MG tablet     sertraline (ZOLOFT) 50 MG tablet     acyclovir (ZOVIRAX) 400 MG tablet     EPINEPHrine (EPIPEN) 0.3  "MG/0.3ML injection     prochlorperazine (COMPAZINE) 10 MG tablet     No current facility-administered medications for this visit.         Allergies   Allergen Reactions     Lisinopril Cough     Phenylephrine Hcl Other (See Comments)     **Entex  HEADACHE (SEVERE)     Phenylpropanolamine Other (See Comments)     **Entex  HEADACHE (SEVERE)     Pseudoephedrine Tannate Other (See Comments)     **Entex  HEADACHE (SEVERE)     Levofloxacin Rash     **Levaquin     Moxifloxacin Hcl [Avelox] Rash       Review Of Systems:  Constitutional:    denies fever, weight changes, chills, and night sweats.  Eyes:    denies blurred or double vision-eyes are dry  Ears/Nose/Throat:   denies ear pain, nose problems, difficulty swallowing  Respiratory:   denies shortness of breath, cough   Skin:   denies rash, lesions  Cardiovascular:   denies chest pain, palpitations, edema  Gastrointestinal:   denies abdominal pain, bloating, nausea, early satiety; no change in bowel habits or blood in stool  Genitourinary:   denies difficulty with urination, blood in urine  Musculoskeletal:    denies new muscle pain, bone pain  Neurologic:   denies lightheadedness, headaches, numbness or tingling  Psychiatric:  Has up and down days-doing well overall  Hematologic/Lymphatic/Immunologic:   denies easy bruising, easy bleeding, lumps or bumps noted  Endocrine:   Denies increased thirst    Fatigue (0=no fatigue; 10=worst fatigue imaginable): 2    Pain  (0=no pain; 10=worst pain imaginable): 0    ECOG Performance Status: 1    Physical Exam:  /60   Pulse 81   Temp 98.7  F (37.1  C) (Tympanic)   Resp 18   Ht 1.6 m (5' 3\")   Wt 75.8 kg (167 lb 1.7 oz)   SpO2 99%   BMI 29.60 kg/m      GENERAL APPEARANCE: Healthy, alert and in no acute distress.  HEENT: Normocephalic, Sclerae anicteric.  Sclera are somewhat erythematous.  Oropharynx without ulcers, lesions, or thrush.  NECK:   No asymmetry or masses, no thyromegaly.  LYMPHATICS: No palpable cervical, " supraclavicular, axillary, or inguinal nodes   RESP: Lungs clear to auscultation bilaterally, respirations regular and easy  CARDIOVASCULAR: Regular rate and rhythm. Normal S1, S2; no murmur, gallop, or rub.  ABDOMEN: Soft, nontender. Bowel sounds auscultated all 4 quadrants. No palpable organomegaly or masses.  MUSCULOSKELETAL: Extremities without gross deformities noted. No edema of bilateral lower extremities.  NEURO: Alert and oriented x 3.  Gait steady.  PSYCHIATRIC: Mentation and affect appear normal.  Mood appropriate.    Laboratory:  Component      Latest Ref Rng & Units 1/19/2021   WBC      4.0 - 11.0 10e9/L 6.2   RBC Count      3.8 - 5.2 10e12/L 4.14   Hemoglobin      11.7 - 15.7 g/dL 13.3   Hematocrit      35.0 - 47.0 % 38.9   MCV      78 - 100 fl 94   MCH      26.5 - 33.0 pg 32.1   MCHC      31.5 - 36.5 g/dL 34.2   RDW      10.0 - 15.0 % 13.7   Platelet Count      150 - 450 10e9/L 222   Diff Method       Automated Method   % Neutrophils      % 63.6   % Lymphocytes      % 22.4   % Monocytes      % 10.7   % Eosinophils      % 1.9   % Basophils      % 0.3   % Immature Granulocytes      % 1.1   Nucleated RBCs      0 /100 0   Absolute Neutrophil      1.6 - 8.3 10e9/L 4.0   Absolute Lymphocytes      0.8 - 5.3 10e9/L 1.4   Absolute Monocytes      0.0 - 1.3 10e9/L 0.7   Absolute Eosinophils      0.0 - 0.7 10e9/L 0.1   Absolute Basophils      0.0 - 0.2 10e9/L 0.0   Abs Immature Granulocytes      0 - 0.4 10e9/L 0.1   Absolute Nucleated RBC       0.0   Albumin Fraction      3.7 - 5.1 g/dL 4.0   Alpha 1 Fraction      0.2 - 0.4 g/dL 0.3   Alpha 2 Fraction      0.5 - 0.9 g/dL 0.8   Beta Fraction      0.6 - 1.0 g/dL 0.6   Gamma Fraction      0.7 - 1.6 g/dL 1.5   Monoclonal Peak      0.0 g/dL 1.0 (H)   ELP Interpretation:       Monoclonal protein (1.0 g/dL) seen in the gamma fraction. See immunofixation report on . . .   Immunofixation ELP       (Note)   IGG      610 - 1,616 mg/dL 1,697 (H)   IGA      84 - 499 mg/dL 9  (L)   IGM      35 - 242 mg/dL <10 (L)   Beta-2-Microglobulin      <2.3 mg/L 2.5 (H)   Viscosity Index      1.4 - 1.8 1.6   Immunofix ELP Urine       (Note)   Protein Total      6.8 - 8.8 g/dL 7.2     Imaging Studies:  None completed for today's visit      ASSESSMENT/PLAN:    #1 Multiple myeloma:   IgG kappa multiple myeloma with 80% involvement of the bone marrow diagnosed June 2019 initially treated with Velcade and Decadron and received 4 cycles with increasing M-spike and kappa/lambda ratio.  Treatment changed to CyBorD on 10/1/2019.  M-spike plateau at 1.2 and treatment changed to Darzalex Faspro injection.  M-spike at 1.0.  She will initiate cycle 3 today and follow up prior to cycle 4 with labs per treatment plan.     #2  Bone lesions: Continue Zometa every 3 months.  Due February 2021.     I encouraged patient to call with any questions or concerns.       Barbie Quintana NP  APRN, FNP-BC, AOCNP

## 2021-01-26 NOTE — PATIENT INSTRUCTIONS
We would like to see you back per your calender.     When you are in need of a refill, please call your pharmacy and they will send us a request.     If you have any questions please call [phone]    Other instructions:  none

## 2021-01-26 NOTE — NURSING NOTE
"Chief Complaint   Patient presents with     RECHECK     Follow up Multiple myeloma not having achieved remission        Initial /60   Pulse 81   Temp 98.7  F (37.1  C) (Tympanic)   Resp 18   Ht 1.6 m (5' 3\")   Wt 75.8 kg (167 lb 1.7 oz)   SpO2 99%   BMI 29.60 kg/m   Estimated body mass index is 29.6 kg/m  as calculated from the following:    Height as of this encounter: 1.6 m (5' 3\").    Weight as of this encounter: 75.8 kg (167 lb 1.7 oz).  Medication Reconciliation: complete.  Immunizations and advance directives status reviewed. Pain scale =0 , PHQ-2=0.          Vilma Chi LPN    "

## 2021-01-26 NOTE — PATIENT INSTRUCTIONS
We will see you back as planned. If you have any questions or concerns, we can be reached Monday through Friday 8am - 430pm at [phone] ([facility] [place] If you have concerns related to a potential reaction/side effect after hours/weekends/holiday's, please seek emergent medical care.

## 2021-01-27 LAB
KAPPA LC UR-MCNC: 0.24 MG/DL (ref 0.33–1.94)
KAPPA LC/LAMBDA SER: 1.33 {RATIO} (ref 0.26–1.65)
LAMBDA LC SERPL-MCNC: 0.18 MG/DL (ref 0.57–2.63)

## 2021-02-08 DIAGNOSIS — F33.0 MAJOR DEPRESSIVE DISORDER, RECURRENT EPISODE, MILD (H): Primary | ICD-10-CM

## 2021-02-08 NOTE — TELEPHONE ENCOUNTER
Zoloft 50mg      Last Written Prescription Date:  8/6/20  Last Fill Quantity: 45,   # refills: 3  Last Office Visit: 1/18/21  Future Office visit:    Next 5 appointments (look out 90 days)    Feb 11, 2021  9:45 AM  (Arrive by 9:30 AM)  Return Visit with Bridgette Ly PA-C  [facility] ([facility] ) [address] [place] [address]  [phone]   Feb 23, 2021 12:45 PM  (Arrive by 12:30 PM)  Return Visit with Barbie Quintana NP  [facility] ([facility] ) [address] [place] [address]  [phone]   Mar 23, 2021  1:30 PM  (Arrive by 1:15 PM)  Return Visit with Barbie Quintana NP  [facility] ([facility] ) [address] [place] [address]  [phone]   Apr 19, 2021 10:45 AM  (Arrive by 10:30 AM)  SHORT with VENKATESH Stevens  [facility] ([facility] ) [address]  [place] [address]  [phone]   Apr 20, 2021 11:15 AM  (Arrive by 11:00 AM)  Return Visit with Barbie Quintana NP  [facility] ([facility] ) [address]  [place] [address]  [phone]           Routing refill request to provider for review/approval because:

## 2021-02-09 ENCOUNTER — INFUSION THERAPY VISIT (OUTPATIENT)
Dept: INFUSION THERAPY | Facility: OTHER | Age: 71
End: 2021-02-09
Attending: INTERNAL MEDICINE
Payer: MEDICARE

## 2021-02-09 VITALS
BODY MASS INDEX: 29.77 KG/M2 | WEIGHT: 167.99 LBS | DIASTOLIC BLOOD PRESSURE: 73 MMHG | HEIGHT: 63 IN | HEART RATE: 87 BPM | SYSTOLIC BLOOD PRESSURE: 113 MMHG | OXYGEN SATURATION: 94 % | TEMPERATURE: 98.6 F | RESPIRATION RATE: 18 BRPM

## 2021-02-09 DIAGNOSIS — C90.00 MULTIPLE MYELOMA NOT HAVING ACHIEVED REMISSION (H): Primary | ICD-10-CM

## 2021-02-09 LAB
BASOPHILS # BLD AUTO: 0 10E9/L (ref 0–0.2)
BASOPHILS NFR BLD AUTO: 0.5 %
DIFFERENTIAL METHOD BLD: NORMAL
EOSINOPHIL # BLD AUTO: 0.2 10E9/L (ref 0–0.7)
EOSINOPHIL NFR BLD AUTO: 3.7 %
ERYTHROCYTE [DISTWIDTH] IN BLOOD BY AUTOMATED COUNT: 13.7 % (ref 10–15)
HCT VFR BLD AUTO: 39 % (ref 35–47)
HGB BLD-MCNC: 13.1 G/DL (ref 11.7–15.7)
IMM GRANULOCYTES # BLD: 0 10E9/L (ref 0–0.4)
IMM GRANULOCYTES NFR BLD: 0.5 %
LYMPHOCYTES # BLD AUTO: 1.1 10E9/L (ref 0.8–5.3)
LYMPHOCYTES NFR BLD AUTO: 19 %
MCH RBC QN AUTO: 31.6 PG (ref 26.5–33)
MCHC RBC AUTO-ENTMCNC: 33.6 G/DL (ref 31.5–36.5)
MCV RBC AUTO: 94 FL (ref 78–100)
MONOCYTES # BLD AUTO: 0.7 10E9/L (ref 0–1.3)
MONOCYTES NFR BLD AUTO: 11.9 %
NEUTROPHILS # BLD AUTO: 3.8 10E9/L (ref 1.6–8.3)
NEUTROPHILS NFR BLD AUTO: 64.4 %
NRBC # BLD AUTO: 0 10*3/UL
NRBC BLD AUTO-RTO: 0 /100
PLATELET # BLD AUTO: 229 10E9/L (ref 150–450)
RBC # BLD AUTO: 4.15 10E12/L (ref 3.8–5.2)
WBC # BLD AUTO: 5.9 10E9/L (ref 4–11)

## 2021-02-09 PROCEDURE — 85025 COMPLETE CBC W/AUTO DIFF WBC: CPT | Mod: ZL | Performed by: NURSE PRACTITIONER

## 2021-02-09 PROCEDURE — 250N000011 HC RX IP 250 OP 636: Performed by: NURSE PRACTITIONER

## 2021-02-09 PROCEDURE — 36415 COLL VENOUS BLD VENIPUNCTURE: CPT | Mod: ZL | Performed by: NURSE PRACTITIONER

## 2021-02-09 PROCEDURE — 96401 CHEMO ANTI-NEOPL SQ/IM: CPT

## 2021-02-09 PROCEDURE — 250N000012 HC RX MED GY IP 250 OP 636 PS 637: Performed by: NURSE PRACTITIONER

## 2021-02-09 PROCEDURE — 250N000013 HC RX MED GY IP 250 OP 250 PS 637: Performed by: NURSE PRACTITIONER

## 2021-02-09 RX ORDER — DEXAMETHASONE 4 MG/1
12 TABLET ORAL ONCE
Status: COMPLETED | OUTPATIENT
Start: 2021-02-09 | End: 2021-02-09

## 2021-02-09 RX ORDER — ACETAMINOPHEN 325 MG/1
650 TABLET ORAL ONCE
Status: COMPLETED | OUTPATIENT
Start: 2021-02-09 | End: 2021-02-09

## 2021-02-09 RX ORDER — DIPHENHYDRAMINE HCL 50 MG
50 CAPSULE ORAL ONCE
Status: COMPLETED | OUTPATIENT
Start: 2021-02-09 | End: 2021-02-09

## 2021-02-09 RX ADMIN — DIPHENHYDRAMINE HYDROCHLORIDE 50 MG: 50 CAPSULE ORAL at 12:54

## 2021-02-09 RX ADMIN — DEXAMETHASONE 12 MG: 4 TABLET ORAL at 12:53

## 2021-02-09 RX ADMIN — ACETAMINOPHEN 650 MG: 325 TABLET, FILM COATED ORAL at 12:54

## 2021-02-09 RX ADMIN — DARATUMUMAB AND HYALURONIDASE-FIHJ (HUMAN RECOMBINANT) 1800 MG: 1800; 30000 INJECTION SUBCUTANEOUS at 13:33

## 2021-02-09 ASSESSMENT — PAIN SCALES - GENERAL: PAINLEVEL: NO PAIN (0)

## 2021-02-09 ASSESSMENT — MIFFLIN-ST. JEOR: SCORE: 1241

## 2021-02-09 NOTE — PATIENT INSTRUCTIONS
We will see you back as planned. If you have any questions or concerns, we can be reached Monday through Friday 8am - 430pm at [phone] ([facility] [place]). If you have concerns related to a potential reaction/side effect after hours/weekends/holiday's, please seek emergent medical care.

## 2021-02-09 NOTE — PROGRESS NOTES
Patient is 70 years old, here today accompanied by self for injection of FASPRO per order of Dr Walker.     Call to lab for peripheral draw, they are unable to come to unit at this time.     Butterfly needle inserted into right antecubital.  Immediate blood return noted. 1 lab tube drawn. Needle removed, covered with sterile guaze and coban. Patient tolerated well, denies pain or discomfort at this time. Patient discharged.    Today's lab values are WBC:  5.9,  ANC:  3.8,  HGB:  13.1, PLT:  229.     Patient denies questions nor concerns regarding medication, administration site, side effects, nor aftercare.  Patient identified with two identifiers, order verified, and verbal consent for today's injection obtained from patient.   Patient education provided on s/s of injection site infection, and/or medication specific side effects, and when to call a provider.  Patient instructed to report any adverse effects.     Medication administered per protocol in left upper outer quadrant at 45 degree angle.  Site covered with sterile bandage.  Patient tolerated injection well, no verbal nor non-verbal signs of discomfort noted.  No adverse effects noted at this time.     Patient instructed to call with further questions or concerns.  Patient states understanding and is in agreement with this plan.  Copy of appointments, and after visit summary (AVS) given to patient. Patient discharged.

## 2021-02-11 ENCOUNTER — OFFICE VISIT (OUTPATIENT)
Dept: OTOLARYNGOLOGY | Facility: OTHER | Age: 71
End: 2021-02-11
Attending: AUDIOLOGIST
Payer: MEDICARE

## 2021-02-11 ENCOUNTER — OFFICE VISIT (OUTPATIENT)
Dept: AUDIOLOGY | Facility: OTHER | Age: 71
End: 2021-02-11
Attending: AUDIOLOGIST
Payer: MEDICARE

## 2021-02-11 VITALS
DIASTOLIC BLOOD PRESSURE: 76 MMHG | OXYGEN SATURATION: 97 % | BODY MASS INDEX: 28.88 KG/M2 | TEMPERATURE: 97.7 F | SYSTOLIC BLOOD PRESSURE: 124 MMHG | HEIGHT: 63 IN | WEIGHT: 163 LBS | RESPIRATION RATE: 16 BRPM | HEART RATE: 80 BPM

## 2021-02-11 DIAGNOSIS — H69.92 DYSFUNCTION OF LEFT EUSTACHIAN TUBE: ICD-10-CM

## 2021-02-11 DIAGNOSIS — H90.A32 MIXED CONDUCTIVE AND SENSORINEURAL HEARING LOSS OF LEFT EAR WITH RESTRICTED HEARING OF RIGHT EAR: ICD-10-CM

## 2021-02-11 DIAGNOSIS — C90.00 MULTIPLE MYELOMA NOT HAVING ACHIEVED REMISSION (H): ICD-10-CM

## 2021-02-11 DIAGNOSIS — H72.92 PERFORATION OF TYMPANIC MEMBRANE, LEFT: ICD-10-CM

## 2021-02-11 DIAGNOSIS — H72.92 PERFORATION OF TYMPANIC MEMBRANE, LEFT: Primary | ICD-10-CM

## 2021-02-11 DIAGNOSIS — H90.A32 MIXED CONDUCTIVE AND SENSORINEURAL HEARING LOSS OF LEFT EAR WITH RESTRICTED HEARING OF RIGHT EAR: Primary | ICD-10-CM

## 2021-02-11 DIAGNOSIS — H90.A21 SENSORINEURAL HEARING LOSS (SNHL) OF RIGHT EAR WITH RESTRICTED HEARING OF LEFT EAR: ICD-10-CM

## 2021-02-11 DIAGNOSIS — H90.6 MIXED HEARING LOSS, BILATERAL: ICD-10-CM

## 2021-02-11 DIAGNOSIS — Z98.890 H/O MYRINGOTOMY: ICD-10-CM

## 2021-02-11 PROCEDURE — 92504 EAR MICROSCOPY EXAMINATION: CPT | Performed by: PHYSICIAN ASSISTANT

## 2021-02-11 PROCEDURE — 99213 OFFICE O/P EST LOW 20 MIN: CPT | Mod: 25 | Performed by: PHYSICIAN ASSISTANT

## 2021-02-11 PROCEDURE — 92557 COMPREHENSIVE HEARING TEST: CPT | Performed by: AUDIOLOGIST

## 2021-02-11 PROCEDURE — 92567 TYMPANOMETRY: CPT | Performed by: AUDIOLOGIST

## 2021-02-11 PROCEDURE — G0463 HOSPITAL OUTPT CLINIC VISIT: HCPCS | Mod: 25

## 2021-02-11 PROCEDURE — 92504 EAR MICROSCOPY EXAMINATION: CPT

## 2021-02-11 ASSESSMENT — MIFFLIN-ST. JEOR: SCORE: 1218.49

## 2021-02-11 ASSESSMENT — PAIN SCALES - GENERAL: PAINLEVEL: NO PAIN (0)

## 2021-02-11 NOTE — PROGRESS NOTES
Chief Complaint   Patient presents with     Ear Problem     Pt is here for a f/u left TM perforation.       Patient returns to ENT for recheck of Left TM perforation.   She was last seen on 12/29/20 by myself and removed left tube from canal. Right ear remained stable without an effusion. Recommended observation at this time and water precautions.     Today, she returns for exam and audiogram.     Denies otalgia, otorrhea  Denies worrisome tinnitus  Denies fluctuating hearing loss or tinnitus.   Denies vertigo or facial paraesthesia.       Audiogram  Tympanograms  Type A Right   Type B, large volume left.    Thresholds are improved right 250 Hz sloping to stable mild to severe SNHL and  Left ear decreased moderate to severe mixed loss.   SRT=PTA  WRS-  Right- 100%@70dB  Left- 88% @70 dB    BTTI placed on 9/20/19 by Dr. Ley in office    Past Medical History:   Diagnosis Date     Arthritis      Depressive disorder      Diabetes mellitus, type 2 (H) 1/18/2021     Essential hypertension 10/1/2015     Major depressive disorder, recurrent episode, mild (H) 10/1/2015     Mixed hyperlipidemia 10/1/2015     Multiple myeloma not having achieved remission (H) 6/24/2019     Other specified disorders of bladder 07/09/2012    Irritable Bladder     Seasonal allergies 10/1/2015     Unspecified essential hypertension 03/19/2007     Unspecified sinusitis (chronic) 09/05/2007        Allergies   Allergen Reactions     Lisinopril Cough     Phenylephrine Hcl Other (See Comments)     **Entex  HEADACHE (SEVERE)     Phenylpropanolamine Other (See Comments)     **Entex  HEADACHE (SEVERE)     Pseudoephedrine Tannate Other (See Comments)     **Entex  HEADACHE (SEVERE)     Levofloxacin Rash     **Levaquin     Moxifloxacin Hcl [Avelox] Rash     Current Outpatient Medications   Medication     acyclovir (ZOVIRAX) 400 MG tablet     aspirin (ASA) 81 MG chewable tablet     atorvastatin (LIPITOR) 10 MG tablet     dexamethasone (DECADRON) 4 MG  "tablet     EPINEPHrine (EPIPEN) 0.3 MG/0.3ML injection     fluticasone (FLONASE) 50 MCG/ACT nasal spray     losartan (COZAAR) 50 MG tablet     sertraline (ZOLOFT) 50 MG tablet     prochlorperazine (COMPAZINE) 10 MG tablet     No current facility-administered medications for this visit.       ROS: 10 point ROS neg other than the symptoms noted above in the HPI.  /76   Pulse 80   Temp 97.7  F (36.5  C) (Tympanic)   Resp 16   Ht 1.6 m (5' 3\")   Wt 73.9 kg (163 lb)   SpO2 97%   BMI 28.87 kg/m    General - The patient is well nourished and well developed, and appears to have good nutritional status.    Head and Face - Normocephalic and atraumatic, with no gross asymmetry noted of the contour of the facial features.  The facial nerve is intact, with strong symmetric movements.  Eyes - Extraocular movements intact, and the pupils were reactive to light.  Sclera were not icteric or injected, conjunctiva were pink and moist.  Mouth - Examination of the oral cavity shows pink, healthy, moist mucosa.  No lesions or ulceration noted.  The dentition are in good repair.  The tongue is mobile and midline.  Ears - Examination of the ears showed- Right canal clear. Right TM is intact without effusion or perforation. Ears examined under otologic microscopy. Canal cleaned with cupped forceps.   TM is with perforation, central anterior about 15%. ME appears healthy and dry.   Eyes - Extraocular movements intact, and the pupils were reactive to light.  Sclera were not icteric or injected, conjunctiva were pink and moist.  Mouth - Examination of the oral cavity showed pink, healthy oral mucosa. No lesions or ulcerations noted.  The tongue was mobile and midline, and the dentition were in good condition.    Throat - The walls of the oropharynx were smooth, pink, moist, symmetric, and had no lesions or ulcerations.  The tonsillar pillars and soft palate were symmetric.  The uvula was midline on elevation.    Neck - Normal " midline excursion of the laryngotracheal complex during swallowing.  Full range of motion on passive movement.  Palpation of the occipital, submental, submandibular, internal jugular chain, and supraclavicular nodes did not demonstrate any abnormal lymph nodes or masses.  Palpation of the thyroid was soft and smooth, with no nodules or goiter appreciated.  The trachea was mobile and midline.        ASSESSMENT:         ICD-10-CM    1. Perforation of tympanic membrane, left  H72.92    2. Sensorineural hearing loss (SNHL) of right ear with restricted hearing of left ear  H90.A21    3. Mixed conductive and sensorineural hearing loss of left ear with restricted hearing of right ear  H90.A32    4. Multiple myeloma not having achieved remission (H)  C90.00      Ears appear stable at this time.   At this time, will monitor perforation every 6 months.   Keep ear dry.   Follow up if there is drainage or new ear complaints.   Medical clearance provided for both ears.        She may continue with observation vs. Surgical repair at this juncture. Discussed surgery and expected outcomes. Observation and HAC reviewed.    Dry ear precautions are necessary during this time period.  If it does not heal, a tympanoplasty is usually recommended.  This, when successful, may improve hearing, diminish contamination from water exposure and therefore infection and also decrease the likelihood of cholesteatoma.  The risks and benefits of a tympanoplasty were described  along with necessary pre-operative precautions.   A brochure was shared with the patient today regarding the perforated eardrum.                   Bridgette Ly PA-C  ENT  [facility], [place]  [phone]

## 2021-02-11 NOTE — PROGRESS NOTES
Audiology Evaluation Completed. Please refer SCANNED AUDIOGRAM and/or TYMPANOGRAM for BACKGROUND, RESULTS, RECOMMENDATIONS.      Bia BULLOCK, [facility]  Audiologist #[id number]

## 2021-02-11 NOTE — LETTER
2/11/2021         RE: Carmelita Watts  [address] [facility] [address]        Dear Colleague,    Thank you for referring your patient, Carmelita Watts, to the [facility]. Please see a copy of my visit note below.    Chief Complaint   Patient presents with     Ear Problem     Pt is here for a f/u left TM perforation.       Patient returns to ENT for recheck of Left TM perforation.   She was last seen on 12/29/20 by myself and removed left tube from canal. Right ear remained stable without an effusion. Recommended observation at this time and water precautions.     Today, she returns for exam and audiogram.     Denies otalgia, otorrhea  Denies worrisome tinnitus  Denies fluctuating hearing loss or tinnitus.   Denies vertigo or facial paraesthesia.       Audiogram  Tympanograms  Type A Right   Type B, large volume left.    Thresholds are improved right 250 Hz sloping to stable mild to severe SNHL and  Left ear decreased moderate to severe mixed loss.   SRT=PTA  WRS-  Right- 100%@70dB  Left- 88% @70 dB    BTTI placed on 9/20/19 by Dr. Ley in office    Past Medical History:   Diagnosis Date     Arthritis      Depressive disorder      Diabetes mellitus, type 2 (H) 1/18/2021     Essential hypertension 10/1/2015     Major depressive disorder, recurrent episode, mild (H) 10/1/2015     Mixed hyperlipidemia 10/1/2015     Multiple myeloma not having achieved remission (H) 6/24/2019     Other specified disorders of bladder 07/09/2012    Irritable Bladder     Seasonal allergies 10/1/2015     Unspecified essential hypertension 03/19/2007     Unspecified sinusitis (chronic) 09/05/2007        Allergies   Allergen Reactions     Lisinopril Cough     Phenylephrine Hcl Other (See Comments)     **Entex  HEADACHE (SEVERE)     Phenylpropanolamine Other (See Comments)     **Entex  HEADACHE (SEVERE)     Pseudoephedrine Tannate Other (See Comments)     **Entex  HEADACHE (SEVERE)     Levofloxacin Rash      "**Levaquin     Moxifloxacin Hcl [Avelox] Rash     Current Outpatient Medications   Medication     acyclovir (ZOVIRAX) 400 MG tablet     aspirin (ASA) 81 MG chewable tablet     atorvastatin (LIPITOR) 10 MG tablet     dexamethasone (DECADRON) 4 MG tablet     EPINEPHrine (EPIPEN) 0.3 MG/0.3ML injection     fluticasone (FLONASE) 50 MCG/ACT nasal spray     losartan (COZAAR) 50 MG tablet     sertraline (ZOLOFT) 50 MG tablet     prochlorperazine (COMPAZINE) 10 MG tablet     No current facility-administered medications for this visit.       ROS: 10 point ROS neg other than the symptoms noted above in the HPI.  /76   Pulse 80   Temp 97.7  F (36.5  C) (Tympanic)   Resp 16   Ht 1.6 m (5' 3\")   Wt 73.9 kg (163 lb)   SpO2 97%   BMI 28.87 kg/m    General - The patient is well nourished and well developed, and appears to have good nutritional status.    Head and Face - Normocephalic and atraumatic, with no gross asymmetry noted of the contour of the facial features.  The facial nerve is intact, with strong symmetric movements.  Eyes - Extraocular movements intact, and the pupils were reactive to light.  Sclera were not icteric or injected, conjunctiva were pink and moist.  Mouth - Examination of the oral cavity shows pink, healthy, moist mucosa.  No lesions or ulceration noted.  The dentition are in good repair.  The tongue is mobile and midline.  Ears - Examination of the ears showed- Right canal clear. Right TM is intact without effusion or perforation. Ears examined under otologic microscopy. Canal cleaned with cupped forceps.   TM is with perforation, central anterior about 15%. ME appears healthy and dry.   Eyes - Extraocular movements intact, and the pupils were reactive to light.  Sclera were not icteric or injected, conjunctiva were pink and moist.  Mouth - Examination of the oral cavity showed pink, healthy oral mucosa. No lesions or ulcerations noted.  The tongue was mobile and midline, and the dentition " were in good condition.    Throat - The walls of the oropharynx were smooth, pink, moist, symmetric, and had no lesions or ulcerations.  The tonsillar pillars and soft palate were symmetric.  The uvula was midline on elevation.    Neck - Normal midline excursion of the laryngotracheal complex during swallowing.  Full range of motion on passive movement.  Palpation of the occipital, submental, submandibular, internal jugular chain, and supraclavicular nodes did not demonstrate any abnormal lymph nodes or masses.  Palpation of the thyroid was soft and smooth, with no nodules or goiter appreciated.  The trachea was mobile and midline.        ASSESSMENT:         ICD-10-CM    1. Perforation of tympanic membrane, left  H72.92    2. Sensorineural hearing loss (SNHL) of right ear with restricted hearing of left ear  H90.A21    3. Mixed conductive and sensorineural hearing loss of left ear with restricted hearing of right ear  H90.A32    4. Multiple myeloma not having achieved remission (H)  C90.00      Ears appear stable at this time.   At this time, will monitor perforation every 6 months.   Keep ear dry.   Follow up if there is drainage or new ear complaints.   Medical clearance provided for both ears.        She may continue with observation vs. Surgical repair at this juncture. Discussed surgery and expected outcomes. Observation and HAC reviewed.    Dry ear precautions are necessary during this time period.  If it does not heal, a tympanoplasty is usually recommended.  This, when successful, may improve hearing, diminish contamination from water exposure and therefore infection and also decrease the likelihood of cholesteatoma.  The risks and benefits of a tympanoplasty were described  along with necessary pre-operative precautions.   A brochure was shared with the patient today regarding the perforated eardrum.                   Bridgette Ly PA-C  ENT  [facility], [place]  [phone]        Again, thank you for  allowing me to participate in the care of your patient.        Sincerely,        Bridgette Ly PA-C

## 2021-02-11 NOTE — PATIENT INSTRUCTIONS
Ears look well.   Stable left tympanic membrane perforation.   Will monitor at this time.   Proceed with hearing aid consult  Follow up in 6 months.         Thank you for allowing Bridgette Ly PA-C and our ENT team to participate in your care.  If your medications are too expensive, please give the nurse a call.  We can possibly change this medication.  If you have a scheduling or an appointment question please contact our Health Unit Coordinator at their direct line [phone].   ALL nursing questions or concerns can be directed to your ENT nurse at: [phone] [place]

## 2021-02-11 NOTE — NURSING NOTE
"Chief Complaint   Patient presents with     Ear Problem     Pt is here for a f/u left TM perforation.       Initial /76   Pulse 80   Temp 97.7  F (36.5  C) (Tympanic)   Resp 16   Ht 1.6 m (5' 3\")   Wt 73.9 kg (163 lb)   SpO2 97%   BMI 28.87 kg/m   Estimated body mass index is 28.87 kg/m  as calculated from the following:    Height as of this encounter: 1.6 m (5' 3\").    Weight as of this encounter: 73.9 kg (163 lb).  Medication Reconciliation: complete  Alpa Moore LPN  "

## 2021-02-16 DIAGNOSIS — C90.00 MULTIPLE MYELOMA NOT HAVING ACHIEVED REMISSION (H): ICD-10-CM

## 2021-02-16 PROCEDURE — 36415 COLL VENOUS BLD VENIPUNCTURE: CPT | Mod: ZL | Performed by: INTERNAL MEDICINE

## 2021-02-16 PROCEDURE — 83883 ASSAY NEPHELOMETRY NOT SPEC: CPT | Mod: ZL | Performed by: INTERNAL MEDICINE

## 2021-02-17 LAB
KAPPA LC UR-MCNC: 0.43 MG/DL (ref 0.33–1.94)
KAPPA LC/LAMBDA SER: 2.15 {RATIO} (ref 0.26–1.65)
LAMBDA LC SERPL-MCNC: 0.2 MG/DL (ref 0.57–2.63)

## 2021-02-23 ENCOUNTER — INFUSION THERAPY VISIT (OUTPATIENT)
Dept: INFUSION THERAPY | Facility: OTHER | Age: 71
End: 2021-02-23
Attending: INTERNAL MEDICINE
Payer: MEDICARE

## 2021-02-23 ENCOUNTER — APPOINTMENT (OUTPATIENT)
Dept: LAB | Facility: OTHER | Age: 71
End: 2021-02-23
Attending: INTERNAL MEDICINE
Payer: MEDICARE

## 2021-02-23 ENCOUNTER — ONCOLOGY VISIT (OUTPATIENT)
Dept: ONCOLOGY | Facility: OTHER | Age: 71
End: 2021-02-23
Attending: NURSE PRACTITIONER
Payer: MEDICARE

## 2021-02-23 VITALS
HEART RATE: 91 BPM | DIASTOLIC BLOOD PRESSURE: 70 MMHG | WEIGHT: 167.33 LBS | RESPIRATION RATE: 18 BRPM | OXYGEN SATURATION: 96 % | SYSTOLIC BLOOD PRESSURE: 110 MMHG | BODY MASS INDEX: 29.65 KG/M2 | HEIGHT: 63 IN | TEMPERATURE: 98.8 F

## 2021-02-23 VITALS
DIASTOLIC BLOOD PRESSURE: 70 MMHG | TEMPERATURE: 98.8 F | SYSTOLIC BLOOD PRESSURE: 110 MMHG | HEART RATE: 91 BPM | RESPIRATION RATE: 20 BRPM | WEIGHT: 167.33 LBS | BODY MASS INDEX: 29.65 KG/M2 | OXYGEN SATURATION: 96 % | HEIGHT: 63 IN

## 2021-02-23 DIAGNOSIS — C90.00 MULTIPLE MYELOMA NOT HAVING ACHIEVED REMISSION (H): Primary | ICD-10-CM

## 2021-02-23 LAB
ALBUMIN SERPL-MCNC: 3.5 G/DL (ref 3.4–5)
ALP SERPL-CCNC: 110 U/L (ref 40–150)
ALT SERPL W P-5'-P-CCNC: 22 U/L (ref 0–50)
ANION GAP SERPL CALCULATED.3IONS-SCNC: 6 MMOL/L (ref 3–14)
AST SERPL W P-5'-P-CCNC: 15 U/L (ref 0–45)
BASOPHILS # BLD AUTO: 0 10E9/L (ref 0–0.2)
BASOPHILS NFR BLD AUTO: 0.2 %
BILIRUB SERPL-MCNC: 0.3 MG/DL (ref 0.2–1.3)
BUN SERPL-MCNC: 15 MG/DL (ref 7–30)
CALCIUM SERPL-MCNC: 9.1 MG/DL (ref 8.5–10.1)
CHLORIDE SERPL-SCNC: 104 MMOL/L (ref 94–109)
CO2 SERPL-SCNC: 25 MMOL/L (ref 20–32)
CREAT SERPL-MCNC: 0.7 MG/DL (ref 0.52–1.04)
DIFFERENTIAL METHOD BLD: NORMAL
EOSINOPHIL # BLD AUTO: 0.2 10E9/L (ref 0–0.7)
EOSINOPHIL NFR BLD AUTO: 3.8 %
ERYTHROCYTE [DISTWIDTH] IN BLOOD BY AUTOMATED COUNT: 14 % (ref 10–15)
GFR SERPL CREATININE-BSD FRML MDRD: 86 ML/MIN/{1.73_M2}
GLUCOSE SERPL-MCNC: 91 MG/DL (ref 70–99)
HCT VFR BLD AUTO: 38.4 % (ref 35–47)
HGB BLD-MCNC: 13.1 G/DL (ref 11.7–15.7)
IMM GRANULOCYTES # BLD: 0 10E9/L (ref 0–0.4)
IMM GRANULOCYTES NFR BLD: 0.5 %
LYMPHOCYTES # BLD AUTO: 1.2 10E9/L (ref 0.8–5.3)
LYMPHOCYTES NFR BLD AUTO: 18.9 %
MCH RBC QN AUTO: 31.8 PG (ref 26.5–33)
MCHC RBC AUTO-ENTMCNC: 34.1 G/DL (ref 31.5–36.5)
MCV RBC AUTO: 93 FL (ref 78–100)
MONOCYTES # BLD AUTO: 0.8 10E9/L (ref 0–1.3)
MONOCYTES NFR BLD AUTO: 13.4 %
NEUTROPHILS # BLD AUTO: 4 10E9/L (ref 1.6–8.3)
NEUTROPHILS NFR BLD AUTO: 63.2 %
NRBC # BLD AUTO: 0 10*3/UL
NRBC BLD AUTO-RTO: 0 /100
PLATELET # BLD AUTO: 208 10E9/L (ref 150–450)
POTASSIUM SERPL-SCNC: 3.9 MMOL/L (ref 3.4–5.3)
PROT SERPL-MCNC: 7.6 G/DL (ref 6.8–8.8)
RBC # BLD AUTO: 4.12 10E12/L (ref 3.8–5.2)
SODIUM SERPL-SCNC: 135 MMOL/L (ref 133–144)
WBC # BLD AUTO: 6.3 10E9/L (ref 4–11)

## 2021-02-23 PROCEDURE — G0463 HOSPITAL OUTPT CLINIC VISIT: HCPCS | Mod: 25

## 2021-02-23 PROCEDURE — 85025 COMPLETE CBC W/AUTO DIFF WBC: CPT | Mod: ZL | Performed by: NURSE PRACTITIONER

## 2021-02-23 PROCEDURE — 84165 PROTEIN E-PHORESIS SERUM: CPT | Mod: TC,ZL | Performed by: NURSE PRACTITIONER

## 2021-02-23 PROCEDURE — 250N000012 HC RX MED GY IP 250 OP 636 PS 637: Performed by: NURSE PRACTITIONER

## 2021-02-23 PROCEDURE — 250N000013 HC RX MED GY IP 250 OP 250 PS 637: Mod: GY | Performed by: NURSE PRACTITIONER

## 2021-02-23 PROCEDURE — 36415 COLL VENOUS BLD VENIPUNCTURE: CPT | Mod: ZL | Performed by: NURSE PRACTITIONER

## 2021-02-23 PROCEDURE — 999N001036 HC STATISTIC TOTAL PROTEIN: Mod: ZL | Performed by: NURSE PRACTITIONER

## 2021-02-23 PROCEDURE — 99214 OFFICE O/P EST MOD 30 MIN: CPT | Performed by: NURSE PRACTITIONER

## 2021-02-23 PROCEDURE — 96402 CHEMO HORMON ANTINEOPL SQ/IM: CPT

## 2021-02-23 PROCEDURE — 250N000011 HC RX IP 250 OP 636: Performed by: NURSE PRACTITIONER

## 2021-02-23 PROCEDURE — 80053 COMPREHEN METABOLIC PANEL: CPT | Mod: ZL | Performed by: NURSE PRACTITIONER

## 2021-02-23 RX ORDER — HEPARIN SODIUM,PORCINE 10 UNIT/ML
5 VIAL (ML) INTRAVENOUS
Status: CANCELLED | OUTPATIENT
Start: 2021-02-23

## 2021-02-23 RX ORDER — DIPHENHYDRAMINE HCL 50 MG
50 CAPSULE ORAL ONCE
Status: COMPLETED | OUTPATIENT
Start: 2021-02-23 | End: 2021-02-23

## 2021-02-23 RX ORDER — LORAZEPAM 2 MG/ML
0.5 INJECTION INTRAMUSCULAR EVERY 4 HOURS PRN
Status: CANCELLED
Start: 2021-03-09

## 2021-02-23 RX ORDER — METHYLPREDNISOLONE SODIUM SUCCINATE 125 MG/2ML
125 INJECTION, POWDER, LYOPHILIZED, FOR SOLUTION INTRAMUSCULAR; INTRAVENOUS
Status: CANCELLED
Start: 2021-02-23

## 2021-02-23 RX ORDER — DIPHENHYDRAMINE HCL 50 MG
50 CAPSULE ORAL ONCE
Status: CANCELLED | OUTPATIENT
Start: 2021-02-23

## 2021-02-23 RX ORDER — NALOXONE HYDROCHLORIDE 0.4 MG/ML
.1-.4 INJECTION, SOLUTION INTRAMUSCULAR; INTRAVENOUS; SUBCUTANEOUS
Status: CANCELLED | OUTPATIENT
Start: 2021-02-23

## 2021-02-23 RX ORDER — ACETAMINOPHEN 325 MG/1
650 TABLET ORAL ONCE
Status: CANCELLED | OUTPATIENT
Start: 2021-03-09

## 2021-02-23 RX ORDER — ACETAMINOPHEN 325 MG/1
650 TABLET ORAL ONCE
Status: CANCELLED | OUTPATIENT
Start: 2021-02-23

## 2021-02-23 RX ORDER — ALBUTEROL SULFATE 0.83 MG/ML
2.5 SOLUTION RESPIRATORY (INHALATION)
Status: CANCELLED | OUTPATIENT
Start: 2021-02-23

## 2021-02-23 RX ORDER — HEPARIN SODIUM (PORCINE) LOCK FLUSH IV SOLN 100 UNIT/ML 100 UNIT/ML
5 SOLUTION INTRAVENOUS
Status: CANCELLED | OUTPATIENT
Start: 2021-03-09

## 2021-02-23 RX ORDER — DIPHENHYDRAMINE HYDROCHLORIDE 50 MG/ML
50 INJECTION INTRAMUSCULAR; INTRAVENOUS
Status: CANCELLED
Start: 2021-02-23

## 2021-02-23 RX ORDER — DEXAMETHASONE 4 MG/1
12 TABLET ORAL ONCE
Status: CANCELLED | OUTPATIENT
Start: 2021-03-09

## 2021-02-23 RX ORDER — METHYLPREDNISOLONE SODIUM SUCCINATE 125 MG/2ML
125 INJECTION, POWDER, LYOPHILIZED, FOR SOLUTION INTRAMUSCULAR; INTRAVENOUS
Status: CANCELLED
Start: 2021-03-09

## 2021-02-23 RX ORDER — DIPHENHYDRAMINE HCL 50 MG
50 CAPSULE ORAL ONCE
Status: CANCELLED | OUTPATIENT
Start: 2021-03-09

## 2021-02-23 RX ORDER — DEXAMETHASONE 4 MG/1
12 TABLET ORAL ONCE
Status: COMPLETED | OUTPATIENT
Start: 2021-02-23 | End: 2021-02-23

## 2021-02-23 RX ORDER — SODIUM CHLORIDE 9 MG/ML
1000 INJECTION, SOLUTION INTRAVENOUS CONTINUOUS PRN
Status: CANCELLED
Start: 2021-03-09

## 2021-02-23 RX ORDER — HEPARIN SODIUM,PORCINE 10 UNIT/ML
5 VIAL (ML) INTRAVENOUS
Status: CANCELLED | OUTPATIENT
Start: 2021-03-09

## 2021-02-23 RX ORDER — ALBUTEROL SULFATE 0.83 MG/ML
2.5 SOLUTION RESPIRATORY (INHALATION)
Status: CANCELLED | OUTPATIENT
Start: 2021-03-09

## 2021-02-23 RX ORDER — DIPHENHYDRAMINE HYDROCHLORIDE 50 MG/ML
50 INJECTION INTRAMUSCULAR; INTRAVENOUS
Status: CANCELLED
Start: 2021-03-09

## 2021-02-23 RX ORDER — DEXAMETHASONE 4 MG/1
12 TABLET ORAL ONCE
Status: CANCELLED | OUTPATIENT
Start: 2021-02-23

## 2021-02-23 RX ORDER — MEPERIDINE HYDROCHLORIDE 25 MG/ML
25 INJECTION INTRAMUSCULAR; INTRAVENOUS; SUBCUTANEOUS EVERY 30 MIN PRN
Status: CANCELLED | OUTPATIENT
Start: 2021-03-09

## 2021-02-23 RX ORDER — NALOXONE HYDROCHLORIDE 0.4 MG/ML
.1-.4 INJECTION, SOLUTION INTRAMUSCULAR; INTRAVENOUS; SUBCUTANEOUS
Status: CANCELLED | OUTPATIENT
Start: 2021-03-09

## 2021-02-23 RX ORDER — ACETAMINOPHEN 325 MG/1
650 TABLET ORAL ONCE
Status: COMPLETED | OUTPATIENT
Start: 2021-02-23 | End: 2021-02-23

## 2021-02-23 RX ORDER — LORAZEPAM 2 MG/ML
0.5 INJECTION INTRAMUSCULAR EVERY 4 HOURS PRN
Status: CANCELLED
Start: 2021-02-23

## 2021-02-23 RX ORDER — EPINEPHRINE 1 MG/ML
0.3 INJECTION, SOLUTION, CONCENTRATE INTRAVENOUS EVERY 5 MIN PRN
Status: CANCELLED | OUTPATIENT
Start: 2021-02-23

## 2021-02-23 RX ORDER — HEPARIN SODIUM (PORCINE) LOCK FLUSH IV SOLN 100 UNIT/ML 100 UNIT/ML
5 SOLUTION INTRAVENOUS
Status: CANCELLED | OUTPATIENT
Start: 2021-02-23

## 2021-02-23 RX ORDER — EPINEPHRINE 1 MG/ML
0.3 INJECTION, SOLUTION, CONCENTRATE INTRAVENOUS EVERY 5 MIN PRN
Status: CANCELLED | OUTPATIENT
Start: 2021-03-09

## 2021-02-23 RX ORDER — ALBUTEROL SULFATE 90 UG/1
1-2 AEROSOL, METERED RESPIRATORY (INHALATION)
Status: CANCELLED
Start: 2021-02-23

## 2021-02-23 RX ORDER — MEPERIDINE HYDROCHLORIDE 25 MG/ML
25 INJECTION INTRAMUSCULAR; INTRAVENOUS; SUBCUTANEOUS EVERY 30 MIN PRN
Status: CANCELLED | OUTPATIENT
Start: 2021-02-23

## 2021-02-23 RX ORDER — ALBUTEROL SULFATE 90 UG/1
1-2 AEROSOL, METERED RESPIRATORY (INHALATION)
Status: CANCELLED
Start: 2021-03-09

## 2021-02-23 RX ORDER — SODIUM CHLORIDE 9 MG/ML
1000 INJECTION, SOLUTION INTRAVENOUS CONTINUOUS PRN
Status: CANCELLED
Start: 2021-02-23

## 2021-02-23 RX ADMIN — DEXAMETHASONE 12 MG: 4 TABLET ORAL at 13:25

## 2021-02-23 RX ADMIN — DIPHENHYDRAMINE HYDROCHLORIDE 50 MG: 50 CAPSULE ORAL at 13:25

## 2021-02-23 RX ADMIN — ACETAMINOPHEN 650 MG: 325 TABLET, FILM COATED ORAL at 13:25

## 2021-02-23 RX ADMIN — DARATUMUMAB AND HYALURONIDASE-FIHJ (HUMAN RECOMBINANT) 1800 MG: 1800; 30000 INJECTION SUBCUTANEOUS at 14:11

## 2021-02-23 ASSESSMENT — PAIN SCALES - GENERAL
PAINLEVEL: NO PAIN (0)
PAINLEVEL: NO PAIN (0)

## 2021-02-23 ASSESSMENT — MIFFLIN-ST. JEOR
SCORE: 1238
SCORE: 1238.13

## 2021-02-23 NOTE — NURSING NOTE
"Chief Complaint   Patient presents with     RECHECK     Follow up Multiple myeloma not having achieved remission        Initial /70   Pulse 91   Temp 98.8  F (37.1  C) (Tympanic)   Resp 20   Ht 1.6 m (5' 3\")   Wt 75.9 kg (167 lb 5.3 oz)   SpO2 96%   BMI 29.64 kg/m   Estimated body mass index is 29.64 kg/m  as calculated from the following:    Height as of this encounter: 1.6 m (5' 3\").    Weight as of this encounter: 75.9 kg (167 lb 5.3 oz).  Medication Reconciliation: complete.  Immunizations and advance directives status reviewed. Pain scale =0 , PHQ-2=0.            Vilma Chi LPN    "

## 2021-02-23 NOTE — PROGRESS NOTES
Patient is 70 years old, here today accompanied by sekf for injection of FASPRO per order of Dr Walker.     Labs are within parameters to administer injection.    Today's lab values are WBC:  6.3, HGB:  13.1, Platelet:  208, ANC:  4.0, AST:  15, ALT:  22, Alk Phos:  110, Creatinine:  0.70.     Patient denies questions nor concerns regarding medication, administration site, side effects, nor aftercare.  Patient identified with two identifiers, order verified, and verbal consent for today's injection obtained from patient.   Patient education provided on s/s of injection site infection, and/or medication specific side effects, and when to call a provider.  Patient instructed to report any adverse effects.     Medication administered per protocol in right abdomen, about 3 inches from umbilicus per orders, at 45 degree angle.  Site covered with sterile bandage.  Patient tolerated injection well, no verbal nor non-verbal signs of discomfort noted.  No adverse effects noted at this time.     Patient instructed to call with further questions or concerns.  Patient states understanding and is in agreement with this plan.  Patient discharged.

## 2021-02-23 NOTE — PATIENT INSTRUCTIONS
We would like to see you back per your schedule.     When you are in need of a refill, please call your pharmacy and they will send us a request.     If you have any questions please call [phone]    Other instructions:  none

## 2021-02-23 NOTE — PROGRESS NOTES
Oncology Follow-up Visit:  February 23, 2021    Reason for Visit:  Patient presents with:  RECHECK: Follow up Multiple myeloma not having achieved remission      Nursing Note and documentation reviewed: yes    HPI:   This is a 70-year-old female patient who presents to the oncology clinic today for evaluation prior to receiving cycle 4 day 1 therapy for Stage II-RISS IgG multiple myeloma diagnosed June 2019.  She progressed on Velcade and dexamethasone and CyBorD and is now receiving Darzalex faspro injection.     She presents to the clinic today stating she is doing really good.  She tells me today though that she feels she is overeating and questions if this is related to continued grief after the loss of her .  She plans to join a grief support group.  She has no new complaints today.      Oncologic History:     12/31/2018   patient presented to the emergency room with vertigo and fatigue.  CT scan of the head was negative and subsequent stress test was negative.  5/3/2019  She was seen by her PCP who ordered lab work and noted a total protein of 12.9.  SPEP at that time showed an M spike of 6.2 with a large monoclonal protein seen in the gamma fraction.  Urine immunofixation showed a possible small protein band in the gamma fraction  5/31/2019  she was evaluated by Dr. Walker with Medical Oncology with plan to rule out myeloma and obtain bone marrow aspiration biopsy as well as a metastatic bone survey along with additional labwork  6/18/2019 she underwent bone marrow aspiration and biopsy  6/24/2019  She was seen again by Dr. Walker and CBC showed a hemoglobin of 9.3, M spike 7.3 with monoclonal IgG immunoglobulin of kappa light chain type; serum viscosity was 2.9; quantitative immunoglobulins showed an IgG of 8160, beta-2 microglobulin was 5.8, BUN was 21 with creatinine is 0.8 and total protein was 13.7.  Quantitative kappa/lambda free light chains showed an elevated ratio of 17.0 bone marrow  aspiration biopsy showed plasma cell myeloma with approximately 80% plasma cells.  Immunofixation showed IgG kappa and flow cytometry revealed kappa monotypic plasma cells consistent with clonal plasma cell neoplasm and FISH panel was pending at that time.  It was felt she had at least stage II disease based on her beta-2 microglobulin and anemia.  Plan was to treat with Velcade 1.3 mg per metered squared days 1, 4, 8 and 11/Decadron 40 mg on days 1, 8 and 15 initiation of Revlimid with the second cycle at 25 mg daily days 1 through 14.  Plan was to also obtain an MRI of the lumbar spine to rule out lytic lesion at L3.  She was initiated on Zovirax 400 mg p.o. twice daily.  6/25/2019  1st cycle of chemotherapy initiated  7/1/2019 note in chart regarding patient's large co-pay for the Revlimid and no plan at this point to initiate Revlimid and treat only with Velcade and Decadron per Dr. Walker  7/11/2019  MRI lumbar spine shows a pathologic superior endplate compression fracture at L3 without evidence of retropulsed fragment and innumerable enhancing lesions throughout the lumbar spine consistent with history of multiple myeloma.  9/10/2019  Increasing M-spike and kappa lambda ratio  10/1/2019  Initiation of CyBorD  12/1/2020  Initiation of Darzalex Faspro injection     Current Chemo Regime/TX:  Darzalex faspro injection 1800mg subcutaneous per protocol      **Zometa 4mg every 3 months  Current Cycle: 4 day 1  # of completed cycles:  3     Previous treatment:   Velcade 1.3 mg/m2 days 1, 4, 8 and 11 with Decadron 40 mg days 1, 8 and 15 x 4 cycles;   Velcade 1.5mg.m2/cyclophosphamide 150mg every 7 days on days 1,8,15 and 22/decadron 40mg days 1,8,15,22      Past Medical History:   Diagnosis Date     Arthritis      Depressive disorder      Diabetes mellitus, type 2 (H) 1/18/2021     Essential hypertension 10/1/2015     Major depressive disorder, recurrent episode, mild (H) 10/1/2015     Mixed hyperlipidemia 10/1/2015  "    Multiple myeloma not having achieved remission (H) 6/24/2019     Other specified disorders of bladder 07/09/2012    Irritable Bladder     Seasonal allergies 10/1/2015     Unspecified essential hypertension 03/19/2007     Unspecified sinusitis (chronic) 09/05/2007       Past Surgical History:   Procedure Laterality Date     APPENDECTOMY       BONE MARROW BIOPSY, BONE SPECIMEN, NEEDLE/TROCAR N/A 6/18/2019    Procedure: BONE MARROW BIOPSY;  Surgeon: Maciej Sanz MD;  Location: [facility] OR     CHOLECYSTECTOMY       COLONOSCOPY  07-    repeat 10 years     COLONOSCOPY N/A 12/30/2016    Procedure: COLONOSCOPY;  Surgeon: Bhaskar Franklin DO;  Location: [facility] OR     SINUS SURGERY       TUBAL STERILIZATION         Family History   Problem Relation Age of Onset     Breast Cancer Mother 66        Cause of Death     Parkinsonism Father         \"Possible\"     Coronary Artery Disease Father      Pacemaker Father      Thyroid Disease Daughter      Diabetes No family hx of      Hypertension No family hx of      Hyperlipidemia No family hx of      Cerebrovascular Disease No family hx of      Colon Cancer No family hx of      Prostate Cancer No family hx of      Genetic Disorder No family hx of      Asthma No family hx of      Anesthesia Reaction No family hx of        Social History     Socioeconomic History     Marital status:      Spouse name: Not on file     Number of children: Not on file     Years of education: Not on file     Highest education level: Not on file   Occupational History     Occupation: Financial     Comment:  - ([employer])   Social Needs     Financial resource strain: Not on file     Food insecurity     Worry: Not on file     Inability: Not on file     Transportation needs     Medical: Not on file     Non-medical: Not on file   Tobacco Use     Smoking status: Never Smoker     Smokeless tobacco: Never Used     Tobacco comment: Tried to Quit (YES); QUIT in 1971; Passive Exposure (NO) "   Substance and Sexual Activity     Alcohol use: Yes     Comment: RARELY     Drug use: No     Sexual activity: Yes     Partners: Male     Birth control/protection: None   Lifestyle     Physical activity     Days per week: Not on file     Minutes per session: Not on file     Stress: Not on file   Relationships     Social connections     Talks on phone: Not on file     Gets together: Not on file     Attends Zoroastrian service: Not on file     Active member of club or organization: Not on file     Attends meetings of clubs or organizations: Not on file     Relationship status: Not on file     Intimate partner violence     Fear of current or ex partner: Not on file     Emotionally abused: Not on file     Physically abused: Not on file     Forced sexual activity: Not on file   Other Topics Concern      Service Not Asked     Blood Transfusions Not Asked     Caffeine Concern Yes     Comment: Coffee >6 cups daily     Occupational Exposure Not Asked     Hobby Hazards Not Asked     Sleep Concern Not Asked     Stress Concern Not Asked     Weight Concern Not Asked     Special Diet Not Asked     Back Care Not Asked     Exercise Not Asked     Bike Helmet Not Asked     Seat Belt Not Asked     Self-Exams Not Asked     Parent/sibling w/ CABG, MI or angioplasty before 65F 55M? No   Social History Narrative     Not on file       Current Outpatient Medications   Medication     acyclovir (ZOVIRAX) 400 MG tablet     aspirin (ASA) 81 MG chewable tablet     atorvastatin (LIPITOR) 10 MG tablet     dexamethasone (DECADRON) 4 MG tablet     fluticasone (FLONASE) 50 MCG/ACT nasal spray     losartan (COZAAR) 50 MG tablet     sertraline (ZOLOFT) 50 MG tablet     EPINEPHrine (EPIPEN) 0.3 MG/0.3ML injection     prochlorperazine (COMPAZINE) 10 MG tablet     No current facility-administered medications for this visit.         Allergies   Allergen Reactions     Lisinopril Cough     Phenylephrine Hcl Other (See Comments)     **Entex  HEADACHE  "(SEVERE)     Phenylpropanolamine Other (See Comments)     **Entex  HEADACHE (SEVERE)     Pseudoephedrine Tannate Other (See Comments)     **Entex  HEADACHE (SEVERE)     Levofloxacin Rash     **Levaquin     Moxifloxacin Hcl [Avelox] Rash       Review Of Systems:  Constitutional:    denies fever, chills, and night sweats.  Eyes:    denies blurred or double vision  Ears/Nose/Throat:   denies ear pain, nose problems, difficulty swallowing-is being fitted for hearing aids tomorrow  Respiratory:   denies shortness of breath, cough   Skin:   denies rash, lesions  Cardiovascular:   denies chest pain, palpitations, edema  Gastrointestinal:   denies abdominal pain, bloating, nausea, early satiety; no change in bowel habits or blood in stool-has had issues with heartburn  Genitourinary:   denies difficulty with urination, blood in urine  Musculoskeletal:    denies new muscle pain, bone pain  Neurologic:   denies lightheadedness, headaches, numbness or tingling  Psychiatric:  See HPI  Hematologic/Lymphatic/Immunologic:   denies easy bruising, easy bleeding, lumps or bumps noted  Endocrine:   Denies increased thirst    Fatigue:  mild     ECOG Performance Status: 0    Physical Exam:  /70   Pulse 91   Temp 98.8  F (37.1  C) (Tympanic)   Resp 20   Ht 1.6 m (5' 3\")   Wt 75.9 kg (167 lb 5.3 oz)   SpO2 96%   BMI 29.64 kg/m      GENERAL APPEARANCE: Healthy, alert and in no acute distress.  HEENT: Normocephalic, Sclerae anicteric.  No oral lesions or evidence of thrush.  NECK:   No asymmetry or masses, no thyromegaly.  LYMPHATICS: No palpable cervical, supraclavicular, axillary, or inguinal nodes   RESP: Lungs clear to auscultation bilaterally, respirations regular and easy  CARDIOVASCULAR: Regular rate and rhythm. Normal S1, S2  ABDOMEN: Soft, nontender. Bowel sounds auscultated all 4 quadrants. No palpable organomegaly or masses.  MUSCULOSKELETAL: Extremities without gross deformities noted. No edema of bilateral lower " extremities.  NEURO: Alert and oriented x 3.  Gait steady.  PSYCHIATRIC: Mentation and affect appear normal.  Mood appropriate.    Laboratory:  Results for orders placed or performed in visit on 02/23/21   Comprehensive metabolic panel     Status: None   Result Value Ref Range    Sodium 135 133 - 144 mmol/L    Potassium 3.9 3.4 - 5.3 mmol/L    Chloride 104 94 - 109 mmol/L    Carbon Dioxide 25 20 - 32 mmol/L    Anion Gap 6 3 - 14 mmol/L    Glucose 91 70 - 99 mg/dL    Urea Nitrogen 15 7 - 30 mg/dL    Creatinine 0.70 0.52 - 1.04 mg/dL    GFR Estimate 86 >60 mL/min/[1.73_m2]    GFR Estimate If Black >90 >60 mL/min/[1.73_m2]    Calcium 9.1 8.5 - 10.1 mg/dL    Bilirubin Total 0.3 0.2 - 1.3 mg/dL    Albumin 3.5 3.4 - 5.0 g/dL    Protein Total 7.6 6.8 - 8.8 g/dL    Alkaline Phosphatase 110 40 - 150 U/L    ALT 22 0 - 50 U/L    AST 15 0 - 45 U/L   CBC with platelets differential     Status: None   Result Value Ref Range    WBC 6.3 4.0 - 11.0 10e9/L    RBC Count 4.12 3.8 - 5.2 10e12/L    Hemoglobin 13.1 11.7 - 15.7 g/dL    Hematocrit 38.4 35.0 - 47.0 %    MCV 93 78 - 100 fl    MCH 31.8 26.5 - 33.0 pg    MCHC 34.1 31.5 - 36.5 g/dL    RDW 14.0 10.0 - 15.0 %    Platelet Count 208 150 - 450 10e9/L    Diff Method Automated Method     % Neutrophils 63.2 %    % Lymphocytes 18.9 %    % Monocytes 13.4 %    % Eosinophils 3.8 %    % Basophils 0.2 %    % Immature Granulocytes 0.5 %    Nucleated RBCs 0 0 /100    Absolute Neutrophil 4.0 1.6 - 8.3 10e9/L    Absolute Lymphocytes 1.2 0.8 - 5.3 10e9/L    Absolute Monocytes 0.8 0.0 - 1.3 10e9/L    Absolute Eosinophils 0.2 0.0 - 0.7 10e9/L    Absolute Basophils 0.0 0.0 - 0.2 10e9/L    Abs Immature Granulocytes 0.0 0 - 0.4 10e9/L    Absolute Nucleated RBC 0.0        Imaging Studies:  None completed for today's visit      ASSESSMENT/PLAN:    #1 Multiple myeloma:   IgG kappa multiple myeloma with 80% involvement of the bone marrow diagnosed June 2019 initially treated with Velcade and Decadron and  received 4 cycles with increasing M-spike and kappa/lambda ratio.  Treatment changed to CyBorD on 10/1/2019.  M-spike plateau at 1.2 and treatment changed to monotherapy with Darzalex Faspro injection.  M spike is pending.  She will initiate cycle 4 today and follow up prior to cycle 5 with serum protein electrophoresis, kappa lambda ratio and protein immunofixation to be completed the week prior to cycle 5.  CBC and CMP will be completed on day 1 of cycle 5..    #2  Bone lesions: Continue Zometa every 3 months.  She will receive her infusion today and then again in 3 months.  We did discuss the importance of continue with every 6-month dental exams and being sure that her dentist is aware she is on this therapy.    I encouraged patient to call with any questions or concerns.    Barbie Quintana, NP  APRN, FNP-BC, AOCNP

## 2021-02-23 NOTE — PROGRESS NOTES
Peripheral labs ordered. Butterfly needle inserted into right antecubital space.  Immediate blood return noted. 1 lab tube drawn. Needle removed, covered with sterile guaze and coban. Patient tolerated well, denies pain or discomfort at this time. Patient discharged.

## 2021-02-24 ENCOUNTER — OFFICE VISIT (OUTPATIENT)
Dept: AUDIOLOGY | Facility: OTHER | Age: 71
End: 2021-02-24
Attending: AUDIOLOGIST

## 2021-02-24 DIAGNOSIS — H90.3 SENSORINEURAL HEARING LOSS (SNHL) OF BOTH EARS: Primary | ICD-10-CM

## 2021-02-24 PROCEDURE — 92591 HC HEARING AID EXAM BINAURAL: CPT | Performed by: AUDIOLOGIST

## 2021-02-24 NOTE — PROGRESS NOTES
BACKGROUND:  Carmelita was seen today for consult regarding hearing aids. The patient notes difficulty with communication in a variety of listening situations and would like to explore possible benefit obtained via use of amplification.      Patient has received medical clearance for hearing aid use from [facility].  Carmelita is a binaural hearing aid candidate and will receive a Hearing Aid Recommendation today.    RESULTS:  Audiology Assistant reviewed below information:      Estimated insurance coverage for hearing aids reviewed. She declines Epic coverage. She understands benefits are out of network and thus services out of pocket.      [facility] form titled 'Legal rights and consumer information about purchasing a hearing instrument verbally reviewed and given to patient. Trial Period, cancellation fee, warranties highlighted. Patient risk factors have been provided to the patient in writing prior to the sale of the hearing aid per FDA regulation. The risk factors are also available in the User Instructional Booklet to be presented on the day of the hearing aid fitting.    ABN (Advanced Beneficiary Notice of Non-Coverage) completed and copy given to patient.       Hearing aid recommendation provided by Audiologist.    Thru use of hearing aids patient would like to improve ability to hear:    where there are groups and noise.     to hear the television at a lower volume to enjoy this activity with other people.     Carmelita also reports trouble hearing in the car, at home, and on the telephone if there is not a volume control.      Discussed hearing aid styles, technology, features, and options at length with Carmelita.  Sample devices were shown. Pros/Cons of options reviewed.  Expectations for amplification discussed. .Also discussed the importance of binaural amplification for hearing speech in the presence of background noise and localizing the directions of sounds.  Wireless options were  also discussed at length and sample devices were shown.       Hearing Aid Recommendations: Hearing Aid Recommendation reviewed with patient. Pt chose to proceed with order and Purchase Agreement completed. Copies provided to patient.      Hearing Aids:  Binaural Oticon More 2 miniRITE  Color: 90  Battery Size: rechargeable  Earmold/Tips:  IN-THE-CANAL (BRANDON) 1-85        RECOMMENDATIONS:  The 45 day trial period was explained to patient, and they expressed understanding. Ms. Watts signed the Hearing Aid Purchase Agreement and was given a copy, as well as details on her hearing aids. Patient was counseled that exact out of pocket amounts cannot be determined for hearing aid claims being sent to insurance. Any insurance coverage information presented to the patient is an estimate only, and is not a guarantee of payment. Patient has been advised to check with their own insurance.      Patient scheduled to return to clinic in 2 weeks for hearing aid fitting, programming and orientation.    Bia Ortega M.S.,[facility]-A  Audiologist #[id number]

## 2021-02-25 LAB
ALBUMIN SERPL ELPH-MCNC: 4 G/DL (ref 3.7–5.1)
ALPHA1 GLOB SERPL ELPH-MCNC: 0.3 G/DL (ref 0.2–0.4)
ALPHA2 GLOB SERPL ELPH-MCNC: 0.8 G/DL (ref 0.5–0.9)
B-GLOBULIN SERPL ELPH-MCNC: 0.6 G/DL (ref 0.6–1)
GAMMA GLOB SERPL ELPH-MCNC: 1.3 G/DL (ref 0.7–1.6)
M PROTEIN SERPL ELPH-MCNC: 1 G/DL
PROT PATTERN SERPL ELPH-IMP: ABNORMAL

## 2021-03-09 ENCOUNTER — INFUSION THERAPY VISIT (OUTPATIENT)
Dept: INFUSION THERAPY | Facility: OTHER | Age: 71
End: 2021-03-09
Attending: INTERNAL MEDICINE
Payer: MEDICARE

## 2021-03-09 VITALS
TEMPERATURE: 99.2 F | RESPIRATION RATE: 17 BRPM | OXYGEN SATURATION: 98 % | WEIGHT: 167.11 LBS | DIASTOLIC BLOOD PRESSURE: 85 MMHG | HEART RATE: 86 BPM | BODY MASS INDEX: 29.61 KG/M2 | SYSTOLIC BLOOD PRESSURE: 134 MMHG

## 2021-03-09 DIAGNOSIS — C90.00 MULTIPLE MYELOMA NOT HAVING ACHIEVED REMISSION (H): Primary | ICD-10-CM

## 2021-03-09 LAB
ALBUMIN SERPL-MCNC: 3.3 G/DL (ref 3.4–5)
BASOPHILS # BLD AUTO: 0 10E9/L (ref 0–0.2)
BASOPHILS NFR BLD AUTO: 0.7 %
CALCIUM SERPL-MCNC: 8.8 MG/DL (ref 8.5–10.1)
CREAT SERPL-MCNC: 0.66 MG/DL (ref 0.52–1.04)
DIFFERENTIAL METHOD BLD: NORMAL
EOSINOPHIL # BLD AUTO: 0.3 10E9/L (ref 0–0.7)
EOSINOPHIL NFR BLD AUTO: 4.5 %
ERYTHROCYTE [DISTWIDTH] IN BLOOD BY AUTOMATED COUNT: 14.1 % (ref 10–15)
GFR SERPL CREATININE-BSD FRML MDRD: 88 ML/MIN/{1.73_M2}
HCT VFR BLD AUTO: 38.4 % (ref 35–47)
HGB BLD-MCNC: 13.2 G/DL (ref 11.7–15.7)
IMM GRANULOCYTES # BLD: 0 10E9/L (ref 0–0.4)
IMM GRANULOCYTES NFR BLD: 0.5 %
LYMPHOCYTES # BLD AUTO: 1.3 10E9/L (ref 0.8–5.3)
LYMPHOCYTES NFR BLD AUTO: 22.9 %
MCH RBC QN AUTO: 31.7 PG (ref 26.5–33)
MCHC RBC AUTO-ENTMCNC: 34.4 G/DL (ref 31.5–36.5)
MCV RBC AUTO: 92 FL (ref 78–100)
MONOCYTES # BLD AUTO: 0.7 10E9/L (ref 0–1.3)
MONOCYTES NFR BLD AUTO: 12.2 %
NEUTROPHILS # BLD AUTO: 3.3 10E9/L (ref 1.6–8.3)
NEUTROPHILS NFR BLD AUTO: 59.2 %
NRBC # BLD AUTO: 0 10*3/UL
NRBC BLD AUTO-RTO: 0 /100
PLATELET # BLD AUTO: 251 10E9/L (ref 150–450)
RBC # BLD AUTO: 4.16 10E12/L (ref 3.8–5.2)
WBC # BLD AUTO: 5.5 10E9/L (ref 4–11)

## 2021-03-09 PROCEDURE — 36415 COLL VENOUS BLD VENIPUNCTURE: CPT | Mod: ZL | Performed by: NURSE PRACTITIONER

## 2021-03-09 PROCEDURE — 82310 ASSAY OF CALCIUM: CPT | Mod: ZL | Performed by: NURSE PRACTITIONER

## 2021-03-09 PROCEDURE — 82040 ASSAY OF SERUM ALBUMIN: CPT | Mod: ZL | Performed by: NURSE PRACTITIONER

## 2021-03-09 PROCEDURE — 96401 CHEMO ANTI-NEOPL SQ/IM: CPT

## 2021-03-09 PROCEDURE — 85025 COMPLETE CBC W/AUTO DIFF WBC: CPT | Mod: ZL | Performed by: NURSE PRACTITIONER

## 2021-03-09 PROCEDURE — 250N000013 HC RX MED GY IP 250 OP 250 PS 637: Performed by: NURSE PRACTITIONER

## 2021-03-09 PROCEDURE — 258N000003 HC RX IP 258 OP 636: Performed by: NURSE PRACTITIONER

## 2021-03-09 PROCEDURE — 250N000012 HC RX MED GY IP 250 OP 636 PS 637: Performed by: NURSE PRACTITIONER

## 2021-03-09 PROCEDURE — 82565 ASSAY OF CREATININE: CPT | Mod: ZL | Performed by: NURSE PRACTITIONER

## 2021-03-09 PROCEDURE — 96365 THER/PROPH/DIAG IV INF INIT: CPT

## 2021-03-09 PROCEDURE — 250N000011 HC RX IP 250 OP 636: Performed by: NURSE PRACTITIONER

## 2021-03-09 RX ORDER — DIPHENHYDRAMINE HCL 50 MG
50 CAPSULE ORAL ONCE
Status: COMPLETED | OUTPATIENT
Start: 2021-03-09 | End: 2021-03-09

## 2021-03-09 RX ORDER — ACETAMINOPHEN 325 MG/1
650 TABLET ORAL ONCE
Status: COMPLETED | OUTPATIENT
Start: 2021-03-09 | End: 2021-03-09

## 2021-03-09 RX ORDER — ZOLEDRONIC ACID 0.04 MG/ML
4 INJECTION, SOLUTION INTRAVENOUS ONCE
Status: COMPLETED | OUTPATIENT
Start: 2021-03-09 | End: 2021-03-09

## 2021-03-09 RX ORDER — ZOLEDRONIC ACID 0.04 MG/ML
4 INJECTION, SOLUTION INTRAVENOUS ONCE
Status: CANCELLED | OUTPATIENT
Start: 2021-03-09 | End: 2021-03-09

## 2021-03-09 RX ORDER — DEXAMETHASONE 4 MG/1
12 TABLET ORAL ONCE
Status: COMPLETED | OUTPATIENT
Start: 2021-03-09 | End: 2021-03-09

## 2021-03-09 RX ADMIN — DEXAMETHASONE 12 MG: 4 TABLET ORAL at 13:25

## 2021-03-09 RX ADMIN — DARATUMUMAB AND HYALURONIDASE-FIHJ (HUMAN RECOMBINANT) 1800 MG: 1800; 30000 INJECTION SUBCUTANEOUS at 14:23

## 2021-03-09 RX ADMIN — DIPHENHYDRAMINE HYDROCHLORIDE 50 MG: 50 CAPSULE ORAL at 13:24

## 2021-03-09 RX ADMIN — ZOLEDRONIC ACID 4 MG: 0.04 INJECTION, SOLUTION INTRAVENOUS at 13:38

## 2021-03-09 RX ADMIN — ACETAMINOPHEN 650 MG: 325 TABLET, FILM COATED ORAL at 13:24

## 2021-03-09 RX ADMIN — SODIUM CHLORIDE 250 ML: 9 INJECTION, SOLUTION INTRAVENOUS at 13:38

## 2021-03-09 NOTE — PROGRESS NOTES
Patient is 70 years old, here today accompanied by self for injection of Faspro per order of Bonita Quintana NP  under the supervision of Dr. Walker.     Patient  lab values are are with in normal limits.  Lab values are reviewed.    Labs meet parameters for today's injection.     Patient denies questions nor concerns regarding medication, administration site, side effects, nor aftercare.  Patient identified with two identifiers, order verified, and verbal consent for today's injection obtained from patient.   Patient education provided on s/s of injection site infection, and/or medication specific side effects, and when to call a provider.  Patient instructed to report any adverse effects.     Medication administered in left lower abdomen at 45 degree angle.  Site covered with sterile bandage.  Patient tolerated injection well, no verbal nor non-verbal signs of discomfort noted.  No adverse effects noted at this time.     Patient instructed to call with further questions or concerns.  Patient states understanding and is in agreement with this plan.  Copy of appointments, and after visit summary (AVS) given to patient. Patient discharged.

## 2021-03-09 NOTE — PROGRESS NOTES
24 gauge angio cath inserted into RT ARM.  Immediate blood return noted.  IV secured with sterile, transparent dressing and tape.  Patient tolerated well, denies pain or discomfort at this time.  Flushes easily without resistance, no signs or symptoms of infiltration or infection.  3 ml blood wasted and TWO tubes blood removed for ordered labs. Flushed with 3mL normal saline to clear line. Patient denies questions or concerns regarding infusion and/or medication(s) being administered.

## 2021-03-09 NOTE — PATIENT INSTRUCTIONS
We will see you back as planned. If you have any questions or concerns, we can be reached Monday through Friday 8am - 430pm at [phone] ([facility] [place] If you have concerns related to a potential reaction/side effect after hours/weekends/holiday's, please seek emergent medical care.

## 2021-03-09 NOTE — PROGRESS NOTES
Patient is a 70 year old female here accompanied by self today for infusion of Zometa per order of Bonita Quintana NP    Patient identified with two identifiers, order verified, and verbal consent for today's infusion obtained from patient.      Patient lab values: Reviewed and are WNL.  Patient meets order parameters for today's treatment.     1338  IV pump verified with dose, drug, and rate of administration.  Infusion administered per protocol.  Patient tolerated infusion well, no signs or symptoms of adverse reaction noted.  Patient denies pain nor discomfort.     IV removed, catheter intact.  Site clean, dry and intact.  No signs or symptoms of infiltration or infection.  Covered with a sterile bandage, slight pressure applied for 30 seconds.  Pt instructed to leave bandage intact for a minimum of one hour, and to call with questions or concerns.  Copy of appointments, discharge instructions, and after visit summary (AVS) provided to patient.  Patient states understanding, discharged.

## 2021-03-10 ENCOUNTER — OFFICE VISIT (OUTPATIENT)
Dept: AUDIOLOGY | Facility: OTHER | Age: 71
End: 2021-03-10
Attending: AUDIOLOGIST

## 2021-03-10 DIAGNOSIS — H90.3 SENSORINEURAL HEARING LOSS (SNHL) OF BOTH EARS: Primary | ICD-10-CM

## 2021-03-10 PROCEDURE — V5011 HEARING AID FITTING/CHECKING: HCPCS | Performed by: AUDIOLOGIST

## 2021-03-10 PROCEDURE — 92593 HC HEARING AID CHECK, BINAURAL: CPT | Performed by: AUDIOLOGIST

## 2021-03-10 PROCEDURE — V5261 HEARING AID, DIGIT, BIN, BTE: HCPCS | Mod: NU | Performed by: AUDIOLOGIST

## 2021-03-10 PROCEDURE — V5020 CONFORMITY EVALUATION: HCPCS | Performed by: AUDIOLOGIST

## 2021-03-10 PROCEDURE — V5160 DISPENSING FEE BINAURAL: HCPCS | Performed by: AUDIOLOGIST

## 2021-03-10 PROCEDURE — 92593 PR HEARING AID CHECK, BINAURAL: CPT | Performed by: AUDIOLOGIST

## 2021-03-10 NOTE — PROGRESS NOTES
AUDIOLOGY REPORT    SUBJECTIVE: Carmelita Watts, a 70 year old female, was seen in the Audiology Clinic at [facility] today for a Binaural hearing aid fitting. Previous results have revealed a bilateral  sensorineural hearing loss.     OBJECTIVE:  Prior to fitting, a hearing aid check was performed to ensure device functionality. The hearing aid conformity evaluation was completed.The hearing aids were placed and they provided a good fit. Real-ear-probe-microphone measurements were completed on the Study Edge system and were a good match to NAL-NL2 target with soft sounds audible, moderate sounds comfortable, and loud sounds below discomfort. UCLs are verified through maximum power output measures and demonstrate appropriate limiting of loud inputs.     Hearing Device Info  3/10/2021    Left Ear Make: Oticon   Left Ear Model #: OPN More 2 miniRITE R   Left Ear Style: BTE   Left HA Warranty End Date: 3/25/2024   Left HA Serial #: [id number]   Right Ear Make: Oticon   Right Ear Model #: OPN More 2 miniRITE R   Right Ear Style: BTE   Right HA Warranty End Date: 3/25/2024   Right ESCAMILLA Serial #: [id number]   Fitting Plan: Standard    3/10/2021       ASSESSMENT: Hearing aid fitting completed today. Verification measures were performed. Patient and/or caregiver demonstrated ability to insert and remove hearing aids and adjust volume toggle.    Bia Ortega M.S., [facility]-A  Audiologist #[id number]      HEARING AID ORIENTATION/AUDIOLOGY ASSISTANT      Ms. Watts was oriented to proper hearing aid use, care, cleaning (no water, dry brush), batteries (size Rechargeable, low-battery signal), aid insertion/removal, user booklet, warranty information, storage cases, and other hearing aid details. The patient confirmed understanding of hearing aid use and care, and showed proper insertion of hearing aid while in the office today. Ms. Watts reported good volume and sound quality today.    Nila Arteaga  Audiology  Assistant  [facility]  [phone]        PLAN: Ms. Watts will return for follow-up in 2-3 weeks for a hearing aid review appointment. Please call this clinic with questions regarding today s appointment.

## 2021-03-16 DIAGNOSIS — C90.00 MULTIPLE MYELOMA NOT HAVING ACHIEVED REMISSION (H): ICD-10-CM

## 2021-03-16 PROCEDURE — 82784 ASSAY IGA/IGD/IGG/IGM EACH: CPT | Mod: ZL | Performed by: NURSE PRACTITIONER

## 2021-03-16 PROCEDURE — 86334 IMMUNOFIX E-PHORESIS SERUM: CPT | Mod: TC,ZL | Performed by: NURSE PRACTITIONER

## 2021-03-16 PROCEDURE — 999N001036 HC STATISTIC TOTAL PROTEIN: Mod: ZL | Performed by: NURSE PRACTITIONER

## 2021-03-16 PROCEDURE — 36415 COLL VENOUS BLD VENIPUNCTURE: CPT | Mod: ZL | Performed by: NURSE PRACTITIONER

## 2021-03-16 PROCEDURE — 83883 ASSAY NEPHELOMETRY NOT SPEC: CPT | Mod: ZL | Performed by: NURSE PRACTITIONER

## 2021-03-16 PROCEDURE — 84165 PROTEIN E-PHORESIS SERUM: CPT | Mod: TC,ZL | Performed by: NURSE PRACTITIONER

## 2021-03-17 LAB
ALBUMIN SERPL ELPH-MCNC: 4.2 G/DL (ref 3.7–5.1)
ALPHA1 GLOB SERPL ELPH-MCNC: 0.3 G/DL (ref 0.2–0.4)
ALPHA2 GLOB SERPL ELPH-MCNC: 0.9 G/DL (ref 0.5–0.9)
B-GLOBULIN SERPL ELPH-MCNC: 0.7 G/DL (ref 0.6–1)
GAMMA GLOB SERPL ELPH-MCNC: 1.4 G/DL (ref 0.7–1.6)
IGA SERPL-MCNC: 9 MG/DL (ref 84–499)
IGG SERPL-MCNC: 1577 MG/DL (ref 610–1616)
IGM SERPL-MCNC: 11 MG/DL (ref 35–242)
KAPPA LC UR-MCNC: 0.48 MG/DL (ref 0.33–1.94)
KAPPA LC/LAMBDA SER: 0.71 {RATIO} (ref 0.26–1.65)
LAMBDA LC SERPL-MCNC: 0.68 MG/DL (ref 0.57–2.63)
M PROTEIN SERPL ELPH-MCNC: 1.2 G/DL
PROT PATTERN SERPL ELPH-IMP: ABNORMAL
PROT PATTERN SERPL IFE-IMP: ABNORMAL

## 2021-03-18 ENCOUNTER — TELEPHONE (OUTPATIENT)
Dept: ONCOLOGY | Facility: OTHER | Age: 71
End: 2021-03-18
Payer: COMMERCIAL

## 2021-03-18 DIAGNOSIS — Z53.9 ERRONEOUS ENCOUNTER--DISREGARD: Primary | ICD-10-CM

## 2021-03-18 PROCEDURE — 10000001 PR ERRONEOUS ENCOUNTER--DISREGARD: Performed by: NURSE PRACTITIONER

## 2021-03-21 NOTE — PROGRESS NOTES
Velcade injected SQ into left upper outer arm at 45 degree angle  per protocol rotating sites.     Injection administered per protocol. Patient tolerated infusion without incident, no signs or symptoms of adverse reaction noted. Patient denies pain or discomfort.     Covered with a sterile bandage. Pt instructed to leave bandage intact for a minimum of one hour, and to call with questions or concerns. Patient states understanding, discharged ambulatory.      21-Mar-2021 12:05

## 2021-03-23 ENCOUNTER — ONCOLOGY VISIT (OUTPATIENT)
Dept: ONCOLOGY | Facility: OTHER | Age: 71
End: 2021-03-23
Attending: NURSE PRACTITIONER
Payer: MEDICARE

## 2021-03-23 ENCOUNTER — INFUSION THERAPY VISIT (OUTPATIENT)
Dept: INFUSION THERAPY | Facility: OTHER | Age: 71
End: 2021-03-23
Attending: INTERNAL MEDICINE
Payer: COMMERCIAL

## 2021-03-23 ENCOUNTER — APPOINTMENT (OUTPATIENT)
Dept: LAB | Facility: OTHER | Age: 71
End: 2021-03-23
Attending: INTERNAL MEDICINE
Payer: MEDICARE

## 2021-03-23 VITALS
WEIGHT: 166.67 LBS | SYSTOLIC BLOOD PRESSURE: 111 MMHG | HEIGHT: 63 IN | HEART RATE: 84 BPM | DIASTOLIC BLOOD PRESSURE: 79 MMHG | OXYGEN SATURATION: 93 % | TEMPERATURE: 98.1 F | BODY MASS INDEX: 29.53 KG/M2 | RESPIRATION RATE: 20 BRPM

## 2021-03-23 DIAGNOSIS — M89.9 BONE LESION: ICD-10-CM

## 2021-03-23 DIAGNOSIS — C90.00 MULTIPLE MYELOMA NOT HAVING ACHIEVED REMISSION (H): Primary | ICD-10-CM

## 2021-03-23 DIAGNOSIS — Z53.9 ERRONEOUS ENCOUNTER--DISREGARD: ICD-10-CM

## 2021-03-23 LAB
ALBUMIN SERPL-MCNC: 3.4 G/DL (ref 3.4–5)
ALP SERPL-CCNC: 115 U/L (ref 40–150)
ALT SERPL W P-5'-P-CCNC: 21 U/L (ref 0–50)
ANION GAP SERPL CALCULATED.3IONS-SCNC: 4 MMOL/L (ref 3–14)
AST SERPL W P-5'-P-CCNC: 13 U/L (ref 0–45)
BASOPHILS # BLD AUTO: 0 10E9/L (ref 0–0.2)
BASOPHILS NFR BLD AUTO: 0.3 %
BILIRUB SERPL-MCNC: 0.4 MG/DL (ref 0.2–1.3)
BUN SERPL-MCNC: 16 MG/DL (ref 7–30)
CALCIUM SERPL-MCNC: 8.9 MG/DL (ref 8.5–10.1)
CHLORIDE SERPL-SCNC: 106 MMOL/L (ref 94–109)
CO2 SERPL-SCNC: 26 MMOL/L (ref 20–32)
CREAT SERPL-MCNC: 0.73 MG/DL (ref 0.52–1.04)
DIFFERENTIAL METHOD BLD: NORMAL
EOSINOPHIL # BLD AUTO: 0.3 10E9/L (ref 0–0.7)
EOSINOPHIL NFR BLD AUTO: 5.2 %
ERYTHROCYTE [DISTWIDTH] IN BLOOD BY AUTOMATED COUNT: 13.9 % (ref 10–15)
GFR SERPL CREATININE-BSD FRML MDRD: 82 ML/MIN/{1.73_M2}
GLUCOSE SERPL-MCNC: 121 MG/DL (ref 70–99)
HCT VFR BLD AUTO: 38.8 % (ref 35–47)
HGB BLD-MCNC: 13.2 G/DL (ref 11.7–15.7)
IMM GRANULOCYTES # BLD: 0 10E9/L (ref 0–0.4)
IMM GRANULOCYTES NFR BLD: 0.3 %
LYMPHOCYTES # BLD AUTO: 1.3 10E9/L (ref 0.8–5.3)
LYMPHOCYTES NFR BLD AUTO: 22.4 %
MCH RBC QN AUTO: 31.6 PG (ref 26.5–33)
MCHC RBC AUTO-ENTMCNC: 34 G/DL (ref 31.5–36.5)
MCV RBC AUTO: 93 FL (ref 78–100)
MONOCYTES # BLD AUTO: 0.6 10E9/L (ref 0–1.3)
MONOCYTES NFR BLD AUTO: 11.1 %
NEUTROPHILS # BLD AUTO: 3.5 10E9/L (ref 1.6–8.3)
NEUTROPHILS NFR BLD AUTO: 60.7 %
NRBC # BLD AUTO: 0 10*3/UL
NRBC BLD AUTO-RTO: 0 /100
PLATELET # BLD AUTO: 241 10E9/L (ref 150–450)
POTASSIUM SERPL-SCNC: 4 MMOL/L (ref 3.4–5.3)
PROT SERPL-MCNC: 7.5 G/DL (ref 6.8–8.8)
RBC # BLD AUTO: 4.18 10E12/L (ref 3.8–5.2)
SODIUM SERPL-SCNC: 136 MMOL/L (ref 133–144)
WBC # BLD AUTO: 5.8 10E9/L (ref 4–11)

## 2021-03-23 PROCEDURE — 99215 OFFICE O/P EST HI 40 MIN: CPT | Performed by: NURSE PRACTITIONER

## 2021-03-23 PROCEDURE — G0463 HOSPITAL OUTPT CLINIC VISIT: HCPCS

## 2021-03-23 PROCEDURE — 85025 COMPLETE CBC W/AUTO DIFF WBC: CPT | Mod: ZL | Performed by: INTERNAL MEDICINE

## 2021-03-23 PROCEDURE — 36415 COLL VENOUS BLD VENIPUNCTURE: CPT | Mod: ZL | Performed by: INTERNAL MEDICINE

## 2021-03-23 PROCEDURE — 80053 COMPREHEN METABOLIC PANEL: CPT | Mod: ZL | Performed by: INTERNAL MEDICINE

## 2021-03-23 RX ORDER — DEXAMETHASONE 4 MG/1
TABLET ORAL
Qty: 30 TABLET | Refills: 0 | Status: SHIPPED | OUTPATIENT
Start: 2021-03-23 | End: 2021-05-28

## 2021-03-23 ASSESSMENT — MIFFLIN-ST. JEOR: SCORE: 1235.13

## 2021-03-23 ASSESSMENT — PAIN SCALES - GENERAL: PAINLEVEL: NO PAIN (0)

## 2021-03-23 NOTE — NURSING NOTE
"Chief Complaint   Patient presents with     RECHECK     Follow up Multiple myeloma not having achieved remission        Initial /79   Pulse 84   Temp 98.1  F (36.7  C) (Tympanic)   Resp 20   Ht 1.6 m (5' 3\")   Wt 75.6 kg (166 lb 10.7 oz)   SpO2 93%   BMI 29.52 kg/m   Estimated body mass index is 29.52 kg/m  as calculated from the following:    Height as of this encounter: 1.6 m (5' 3\").    Weight as of this encounter: 75.6 kg (166 lb 10.7 oz).  Medication Reconciliation: complete.  Immunizations and advance directives status reviewed. Pain scale =0 , PHQ-2=0.          Vilma Chi LPN    "

## 2021-03-23 NOTE — PATIENT INSTRUCTIONS
We would like to see you back next week for therapy and Elli will call you with a date and time.   We will have your new schedule done then.    Your prescription for decadron has been sent to: [facility].      When you are in need of a refill, please call your pharmacy and they will send us a request.     If you have any questions please call [phone]    Other instructions:  none

## 2021-03-23 NOTE — PROGRESS NOTES
Peripheral labs ordered. Butterfly needle inserted into RT ARM.  Immediate blood return noted. TWO lab tubes drawn. Needle removed, covered with sterile guaze and coban. Patient tolerated well, denies pain or discomfort at this time. Patient discharged.

## 2021-03-23 NOTE — NURSING NOTE
"Chief Complaint   Patient presents with     RECHECK     Follow up Multiple myeloma not having achieved remission        Initial Resp 20   Ht 1.6 m (5' 3\")   BMI 29.60 kg/m   Estimated body mass index is 29.6 kg/m  as calculated from the following:    Height as of this encounter: 1.6 m (5' 3\").    Weight as of 3/9/21: 75.8 kg (167 lb 1.7 oz).  Medication Reconciliation: complete.  Immunizations and advance directives status reviewed. Pain scale =0 , PHQ-2=0.            Vilma Chi LPN    "

## 2021-03-23 NOTE — PROGRESS NOTES
Oncology Follow-up Visit:  March 23, 2021    Reason for Visit:  Patient presents with:  RECHECK: Follow up Multiple myeloma not having achieved remission      Nursing Note and documentation reviewed: yes    HPI:   This is a 70-year-old female patient who presents to the oncology clinic today for evaluation prior to receiving cycle 5 day 1 therapy for Stage II-RISS IgG multiple myeloma diagnosed June 2019.  She progressed on Velcade and dexamethasone and CyBorD and is now receiving Darzalex faspro injection.    She presents to the clinic today stating she feels she is doing pretty well.  She is trying to keep active and finds this helps her mental health.  She has some mild fatigue but feels overall she is doing fairly well.  She no longer is dealing with any low back pain since starting treatment.  She does have some discomfort to her anterior shoulders which she feels is related to doing all the work she has been going.  She is due to get her second Covid vaccine on Friday.    Oncologic History:     12/31/2018   patient presented to the emergency room with vertigo and fatigue.  CT scan of the head was negative and subsequent stress test was negative.  5/3/2019  She was seen by her PCP who ordered lab work and noted a total protein of 12.9.  SPEP at that time showed an M spike of 6.2 with a large monoclonal protein seen in the gamma fraction.  Urine immunofixation showed a possible small protein band in the gamma fraction  5/31/2019  she was evaluated by Dr. Walker with Medical Oncology with plan to rule out myeloma and obtain bone marrow aspiration biopsy as well as a metastatic bone survey along with additional labwork  6/18/2019 she underwent bone marrow aspiration and biopsy  6/24/2019  She was seen again by Dr. Walker and CBC showed a hemoglobin of 9.3, M spike 7.3 with monoclonal IgG immunoglobulin of kappa light chain type; serum viscosity was 2.9; quantitative immunoglobulins showed an IgG of 8160,  beta-2 microglobulin was 5.8, BUN was 21 with creatinine is 0.8 and total protein was 13.7.  Quantitative kappa/lambda free light chains showed an elevated ratio of 17.0 bone marrow aspiration biopsy showed plasma cell myeloma with approximately 80% plasma cells.  Immunofixation showed IgG kappa and flow cytometry revealed kappa monotypic plasma cells consistent with clonal plasma cell neoplasm and FISH panel was pending at that time.  It was felt she had at least stage II disease based on her beta-2 microglobulin and anemia.  Plan was to treat with Velcade 1.3 mg per metered squared days 1, 4, 8 and 11/Decadron 40 mg on days 1, 8 and 15 initiation of Revlimid with the second cycle at 25 mg daily days 1 through 14.  Plan was to also obtain an MRI of the lumbar spine to rule out lytic lesion at L3.  She was initiated on Zovirax 400 mg p.o. twice daily.  6/25/2019  1st cycle of chemotherapy initiated  7/1/2019 note in chart regarding patient's large co-pay for the Revlimid and no plan at this point to initiate Revlimid and treat only with Velcade and Decadron per Dr. Walker  7/11/2019  MRI lumbar spine shows a pathologic superior endplate compression fracture at L3 without evidence of retropulsed fragment and innumerable enhancing lesions throughout the lumbar spine consistent with history of multiple myeloma.  9/10/2019  Increasing M-spike and kappa lambda ratio  10/1/2019  Initiation of CyBorD  12/1/2020  Initiation of Darzalex Faspro injection     Current Chemo Regime/TX:  Darzalex faspro injection 1800mg subcutaneous per protocol      **Zometa 4mg every 3 months  Current Cycle: 5 day 1  # of completed cycles:  4     Previous treatment:   Velcade 1.3 mg/m2 days 1, 4, 8 and 11 with Decadron 40 mg days 1, 8 and 15 x 4 cycles;   Velcade 1.5mg.m2/cyclophosphamide 150mg every 7 days on days 1,8,15 and 22/decadron 40mg days 1,8,15,22     Past Medical History:   Diagnosis Date     Arthritis      Depressive disorder       "Diabetes mellitus, type 2 (H) 1/18/2021     Essential hypertension 10/1/2015     Major depressive disorder, recurrent episode, mild (H) 10/1/2015     Mixed hyperlipidemia 10/1/2015     Multiple myeloma not having achieved remission (H) 6/24/2019     Other specified disorders of bladder 07/09/2012    Irritable Bladder     Seasonal allergies 10/1/2015     Unspecified essential hypertension 03/19/2007     Unspecified sinusitis (chronic) 09/05/2007       Past Surgical History:   Procedure Laterality Date     APPENDECTOMY       BONE MARROW BIOPSY, BONE SPECIMEN, NEEDLE/TROCAR N/A 6/18/2019    Procedure: BONE MARROW BIOPSY;  Surgeon: Maciej Sanz MD;  Location: [facility]     CHOLECYSTECTOMY       COLONOSCOPY  07-    repeat 10 years     COLONOSCOPY N/A 12/30/2016    Procedure: COLONOSCOPY;  Surgeon: Bhaskar Franklin DO;  Location: [facility]     SINUS SURGERY       TUBAL STERILIZATION         Family History   Problem Relation Age of Onset     Breast Cancer Mother 66        Cause of Death     Parkinsonism Father         \"Possible\"     Coronary Artery Disease Father      Pacemaker Father      Thyroid Disease Daughter      Diabetes No family hx of      Hypertension No family hx of      Hyperlipidemia No family hx of      Cerebrovascular Disease No family hx of      Colon Cancer No family hx of      Prostate Cancer No family hx of      Genetic Disorder No family hx of      Asthma No family hx of      Anesthesia Reaction No family hx of        Social History     Socioeconomic History     Marital status:      Spouse name: Not on file     Number of children: Not on file     Years of education: Not on file     Highest education level: Not on file   Occupational History     Occupation: Financial     Comment:  - ([employer])   Social Needs     Financial resource strain: Not on file     Food insecurity     Worry: Not on file     Inability: Not on file     Transportation needs     Medical: Not on file     " Non-medical: Not on file   Tobacco Use     Smoking status: Never Smoker     Smokeless tobacco: Never Used     Tobacco comment: Tried to Quit (YES); QUIT in 1971; Passive Exposure (NO)   Substance and Sexual Activity     Alcohol use: Yes     Comment: RARELY     Drug use: No     Sexual activity: Yes     Partners: Male     Birth control/protection: None   Lifestyle     Physical activity     Days per week: Not on file     Minutes per session: Not on file     Stress: Not on file   Relationships     Social connections     Talks on phone: Not on file     Gets together: Not on file     Attends Cheondoism service: Not on file     Active member of club or organization: Not on file     Attends meetings of clubs or organizations: Not on file     Relationship status: Not on file     Intimate partner violence     Fear of current or ex partner: Not on file     Emotionally abused: Not on file     Physically abused: Not on file     Forced sexual activity: Not on file   Other Topics Concern      Service Not Asked     Blood Transfusions Not Asked     Caffeine Concern Yes     Comment: Coffee >6 cups daily     Occupational Exposure Not Asked     Hobby Hazards Not Asked     Sleep Concern Not Asked     Stress Concern Not Asked     Weight Concern Not Asked     Special Diet Not Asked     Back Care Not Asked     Exercise Not Asked     Bike Helmet Not Asked     Seat Belt Not Asked     Self-Exams Not Asked     Parent/sibling w/ CABG, MI or angioplasty before 65F 55M? No   Social History Narrative     Not on file       Current Outpatient Medications   Medication     acyclovir (ZOVIRAX) 400 MG tablet     aspirin (ASA) 81 MG chewable tablet     atorvastatin (LIPITOR) 10 MG tablet     fluticasone (FLONASE) 50 MCG/ACT nasal spray     losartan (COZAAR) 50 MG tablet     sertraline (ZOLOFT) 50 MG tablet     EPINEPHrine (EPIPEN) 0.3 MG/0.3ML injection     prochlorperazine (COMPAZINE) 10 MG tablet     No current facility-administered medications  "for this visit.         Allergies   Allergen Reactions     Lisinopril Cough     Phenylephrine Hcl Other (See Comments)     **Entex  HEADACHE (SEVERE)     Phenylpropanolamine Other (See Comments)     **Entex  HEADACHE (SEVERE)     Pseudoephedrine Tannate Other (See Comments)     **Entex  HEADACHE (SEVERE)     Levofloxacin Rash     **Levaquin     Moxifloxacin Hcl [Avelox] Rash       Review Of Systems:  Constitutional:    denies fever, weight changes, chills, and night sweats.  Eyes:    denies blurred or double vision  Ears/Nose/Throat:   denies ear pain, nose problems, difficulty swallowing  Respiratory:   denies shortness of breath, cough   Skin:   denies rash, lesions  Cardiovascular:   denies chest pain, palpitations, edema  Gastrointestinal:   denies abdominal pain, bloating, nausea, early satiety; no change in bowel habits or blood in stool  Genitourinary:   denies difficulty with urination, blood in urine  Musculoskeletal:   See HPI  Neurologic:   denies lightheadedness, headaches, numbness or tingling  Psychiatric:   denies anxiety, depression  Hematologic/Lymphatic/Immunologic:   denies easy bruising, easy bleeding, lumps or bumps noted  Endocrine:   Denies increased thirst    Fatigue:  Mild    ECOG Performance Status: 1    Physical Exam:  /79   Pulse 84   Temp 98.1  F (36.7  C) (Tympanic)   Resp 20   Ht 1.6 m (5' 3\")   Wt 75.6 kg (166 lb 10.7 oz)   SpO2 93%   BMI 29.52 kg/m      GENERAL APPEARANCE: Healthy, alert and in no acute distress.  HEENT: Normocephalic, Sclerae anicteric.   NECK:   No asymmetry or masses, no thyromegaly.  LYMPHATICS: No palpable cervical, supraclavicular, axillary, or inguinal nodes   RESP: Lungs clear to auscultation bilaterally, respirations regular and easy  CARDIOVASCULAR: Regular rate and rhythm. Normal S1, S2; no murmur, gallop, or rub.  ABDOMEN: Soft, nontender. Bowel sounds auscultated all 4 quadrants. No palpable organomegaly or masses.  MUSCULOSKELETAL: " Extremities without gross deformities noted. No edema of bilateral lower extremities.  NEURO: Alert and oriented x 3.  Gait steady.  PSYCHIATRIC: Mentation and affect appear normal.  Mood appropriate.    Laboratory:  Results for orders placed or performed in visit on 03/23/21   CBC with platelets differential     Status: None   Result Value Ref Range    WBC 5.8 4.0 - 11.0 10e9/L    RBC Count 4.18 3.8 - 5.2 10e12/L    Hemoglobin 13.2 11.7 - 15.7 g/dL    Hematocrit 38.8 35.0 - 47.0 %    MCV 93 78 - 100 fl    MCH 31.6 26.5 - 33.0 pg    MCHC 34.0 31.5 - 36.5 g/dL    RDW 13.9 10.0 - 15.0 %    Platelet Count 241 150 - 450 10e9/L    Diff Method Automated Method     % Neutrophils 60.7 %    % Lymphocytes 22.4 %    % Monocytes 11.1 %    % Eosinophils 5.2 %    % Basophils 0.3 %    % Immature Granulocytes 0.3 %    Nucleated RBCs 0 0 /100    Absolute Neutrophil 3.5 1.6 - 8.3 10e9/L    Absolute Lymphocytes 1.3 0.8 - 5.3 10e9/L    Absolute Monocytes 0.6 0.0 - 1.3 10e9/L    Absolute Eosinophils 0.3 0.0 - 0.7 10e9/L    Absolute Basophils 0.0 0.0 - 0.2 10e9/L    Abs Immature Granulocytes 0.0 0 - 0.4 10e9/L    Absolute Nucleated RBC 0.0    Comprehensive metabolic panel     Status: Abnormal   Result Value Ref Range    Sodium 136 133 - 144 mmol/L    Potassium 4.0 3.4 - 5.3 mmol/L    Chloride 106 94 - 109 mmol/L    Carbon Dioxide 26 20 - 32 mmol/L    Anion Gap 4 3 - 14 mmol/L    Glucose 121 (H) 70 - 99 mg/dL    Urea Nitrogen 16 7 - 30 mg/dL    Creatinine 0.73 0.52 - 1.04 mg/dL    GFR Estimate 82 >60 mL/min/[1.73_m2]    GFR Estimate If Black >90 >60 mL/min/[1.73_m2]    Calcium 8.9 8.5 - 10.1 mg/dL    Bilirubin Total 0.4 0.2 - 1.3 mg/dL    Albumin 3.4 3.4 - 5.0 g/dL    Protein Total 7.5 6.8 - 8.8 g/dL    Alkaline Phosphatase 115 40 - 150 U/L    ALT 21 0 - 50 U/L    AST 13 0 - 45 U/L     Component      Latest Ref Rng & Units 3/16/2021   Albumin Fraction      3.7 - 5.1 g/dL 4.2   Alpha 1 Fraction      0.2 - 0.4 g/dL 0.3   Alpha 2 Fraction       0.5 - 0.9 g/dL 0.9   Beta Fraction      0.6 - 1.0 g/dL 0.7   Gamma Fraction      0.7 - 1.6 g/dL 1.4   Monoclonal Peak      0.0 g/dL 1.2 (H)   ELP Interpretation:       Monoclonal protein (about 1.2 g/dL) seen in the gamma fraction.  See immunofixation report . . .   Immunofixation ELP       (Note)   IGG      610 - 1,616 mg/dL 1,577   IGA      84 - 499 mg/dL 9 (L)   IGM      35 - 242 mg/dL 11 (L)   [place] Free Lt Chain      0.33 - 1.94 mg/dL 0.48   Lambda Free Lt Chain      0.57 - 2.63 mg/dL 0.68   Kappa Lambda Ratio      0.26 - 1.65 0.71       Imaging Studies:  None completed for today's visit      ASSESSMENT/PLAN:    #1 Multiple myeloma:   IgG kappa multiple myeloma with 80% involvement of the bone marrow diagnosed June 2019 initially treated with Velcade and Decadron and received 4 cycles with increasing M-spike and kappa/lambda ratio.  Treatment changed to CyBorD on 10/1/2019.  M-spike plateau at 1.2 and treatment changed to monotherapy with Darzalex Faspro injection.  M spike again at 1.2.  Discussed with Dr. Walker and treatment plan will be changed to add Velcade and dexamethasone.  Medication needs to be prior authorized with her insurance company so we will initiate this next week.  I will see her prior to cycle 6 with  myeloma labs.      #2  Bone lesions: Continue Zometa every 3 months.  We did discuss the importance of continue with every 6-month dental exams and being sure that her dentist is aware she is on this therapy.    I encouraged patient to call with any questions or concerns.      Barbie Quintana, NP  APRN, FNP-BC, AOCNP

## 2021-03-30 ENCOUNTER — INFUSION THERAPY VISIT (OUTPATIENT)
Dept: INFUSION THERAPY | Facility: OTHER | Age: 71
End: 2021-03-30
Attending: INTERNAL MEDICINE
Payer: MEDICARE

## 2021-03-30 VITALS
OXYGEN SATURATION: 95 % | DIASTOLIC BLOOD PRESSURE: 79 MMHG | HEART RATE: 77 BPM | BODY MASS INDEX: 29.69 KG/M2 | WEIGHT: 167.55 LBS | RESPIRATION RATE: 18 BRPM | HEIGHT: 63 IN | SYSTOLIC BLOOD PRESSURE: 120 MMHG | TEMPERATURE: 98.7 F

## 2021-03-30 DIAGNOSIS — C90.00 MULTIPLE MYELOMA NOT HAVING ACHIEVED REMISSION (H): Primary | ICD-10-CM

## 2021-03-30 LAB
BASOPHILS # BLD AUTO: 0 10E9/L (ref 0–0.2)
BASOPHILS NFR BLD AUTO: 0.4 %
DIFFERENTIAL METHOD BLD: NORMAL
EOSINOPHIL # BLD AUTO: 0.3 10E9/L (ref 0–0.7)
EOSINOPHIL NFR BLD AUTO: 5.4 %
ERYTHROCYTE [DISTWIDTH] IN BLOOD BY AUTOMATED COUNT: 14 % (ref 10–15)
HCT VFR BLD AUTO: 39.5 % (ref 35–47)
HGB BLD-MCNC: 13.4 G/DL (ref 11.7–15.7)
IMM GRANULOCYTES # BLD: 0 10E9/L (ref 0–0.4)
IMM GRANULOCYTES NFR BLD: 0.2 %
LYMPHOCYTES # BLD AUTO: 1.2 10E9/L (ref 0.8–5.3)
LYMPHOCYTES NFR BLD AUTO: 24.3 %
MCH RBC QN AUTO: 31.2 PG (ref 26.5–33)
MCHC RBC AUTO-ENTMCNC: 33.9 G/DL (ref 31.5–36.5)
MCV RBC AUTO: 92 FL (ref 78–100)
MONOCYTES # BLD AUTO: 0.7 10E9/L (ref 0–1.3)
MONOCYTES NFR BLD AUTO: 14.1 %
NEUTROPHILS # BLD AUTO: 2.8 10E9/L (ref 1.6–8.3)
NEUTROPHILS NFR BLD AUTO: 55.6 %
NRBC # BLD AUTO: 0 10*3/UL
NRBC BLD AUTO-RTO: 0 /100
PLATELET # BLD AUTO: 228 10E9/L (ref 150–450)
RBC # BLD AUTO: 4.3 10E12/L (ref 3.8–5.2)
WBC # BLD AUTO: 5 10E9/L (ref 4–11)

## 2021-03-30 PROCEDURE — 250N000013 HC RX MED GY IP 250 OP 250 PS 637: Performed by: INTERNAL MEDICINE

## 2021-03-30 PROCEDURE — 250N000012 HC RX MED GY IP 250 OP 636 PS 637: Performed by: INTERNAL MEDICINE

## 2021-03-30 PROCEDURE — 85025 COMPLETE CBC W/AUTO DIFF WBC: CPT | Mod: ZL | Performed by: INTERNAL MEDICINE

## 2021-03-30 PROCEDURE — 36415 COLL VENOUS BLD VENIPUNCTURE: CPT | Mod: ZL | Performed by: INTERNAL MEDICINE

## 2021-03-30 PROCEDURE — 96401 CHEMO ANTI-NEOPL SQ/IM: CPT

## 2021-03-30 PROCEDURE — 250N000011 HC RX IP 250 OP 636: Mod: JW | Performed by: INTERNAL MEDICINE

## 2021-03-30 RX ORDER — DIPHENHYDRAMINE HCL 50 MG
50 CAPSULE ORAL ONCE
Status: COMPLETED | OUTPATIENT
Start: 2021-03-30 | End: 2021-03-30

## 2021-03-30 RX ORDER — ACETAMINOPHEN 325 MG/1
650 TABLET ORAL ONCE
Status: COMPLETED | OUTPATIENT
Start: 2021-03-30 | End: 2021-03-30

## 2021-03-30 RX ORDER — DEXAMETHASONE 4 MG/1
20 TABLET ORAL ONCE
Status: COMPLETED | OUTPATIENT
Start: 2021-03-30 | End: 2021-03-30

## 2021-03-30 RX ADMIN — ACETAMINOPHEN 650 MG: 325 TABLET, FILM COATED ORAL at 13:00

## 2021-03-30 RX ADMIN — DARATUMUMAB AND HYALURONIDASE-FIHJ (HUMAN RECOMBINANT) 1800 MG: 1800; 30000 INJECTION SUBCUTANEOUS at 13:42

## 2021-03-30 RX ADMIN — DEXAMETHASONE 20 MG: 4 TABLET ORAL at 13:00

## 2021-03-30 RX ADMIN — BORTEZOMIB 2.4 MG: 3.5 INJECTION, POWDER, LYOPHILIZED, FOR SOLUTION INTRAVENOUS; SUBCUTANEOUS at 13:35

## 2021-03-30 RX ADMIN — DIPHENHYDRAMINE HYDROCHLORIDE 50 MG: 50 CAPSULE ORAL at 13:00

## 2021-03-30 ASSESSMENT — MIFFLIN-ST. JEOR: SCORE: 1239

## 2021-03-30 NOTE — PROGRESS NOTES
Patient is 70 years old, here today for injection of FASPRO and Velcade per order of . Patient meets parameters for today's infusion. Patient identified with two identifiers, order verified, and verbal consent for today's infusion obtained from patient. Written consent for treatment is on file and valid.    Today's lab values: WBC: 5.0, HGB: 13.4, PLT: 228  ANC: 2.8.     Patient meets order parameters for today's treatment.    Patient denies questions or concerns regarding injection and/or medication(s) being administered.    Independent dose check completed with UZMA Dumont.    Velcade injected SQ into right upper outer arm at a 45 degree angle  per protocol rotating sites. Patient tolerated injection without incident, no signs or symptoms of adverse reaction noted. Patient denies pain or discomfort.     FASPROinjected SQ into right abd, 3 inches from umbilicus at 45 degree angle  per protocol rotating sites. Patient tolerated injection without incident, no signs or symptoms of adverse reaction noted. Patient denies pain or discomfort.      Covered with a sterile bandage. Pt instructed to leave bandage intact for a minimum of one hour, and to call with questions or concerns. Copy of appointments, discharge instructions, and after visit summary (AVS) provided to patient. Patient states understanding, discharged.

## 2021-03-31 ENCOUNTER — ALLIED HEALTH/NURSE VISIT (OUTPATIENT)
Dept: AUDIOLOGY | Facility: OTHER | Age: 71
End: 2021-03-31
Attending: AUDIOLOGIST
Payer: MEDICARE

## 2021-03-31 DIAGNOSIS — H90.3 SENSORINEURAL HEARING LOSS (SNHL) OF BOTH EARS: Primary | ICD-10-CM

## 2021-03-31 PROCEDURE — V5299 HEARING SERVICE: HCPCS

## 2021-03-31 NOTE — PROGRESS NOTES
AUDIOLOGY ASSISTANT REPORT    SUBJECTIVE:Carmelita Watts is a 70 year old female who was seen in the Audiology Clinic at the [facility] on 3/31/2021  for a follow-up review of their hearing aids.  The patient has been seen previously in this clinic and was fit with OtSynapCell More 2 miniRITE R hearing aids on 03/10/2021.  Carmelita reports good sound quality with the hearing aid(s).     OBJECTIVE:   A follow-up review was performed.  The hearing aid conformity evaluation was previously completed by the audiologist. Based on patient report, no audiology appointment for adjustments needed.    Listening goals reviewed and patient reports they are met to satisfaction.  Patient denied concerns with retention, fit, or function.    Reviewed 45 day trial period, care, cleaning (no water, dry brush), batteries (size Rechargeable) low-battery signal), aid insertion/removal, volume adjustment (if applicable), user booklet, warranty information, storage cases, and other hearing aid details.       ASSESSMENT: A follow-up appointment for hearing aid(s) was completed today. Changes to hearing aid if shown was completed as outlined above.     PLAN:Carmelita will return for follow-up as needed, or in 12 months.  The patient reports satisfaction and wants to keep the hearing aids.The patient will return for hearing aid checks as needed and in 12 months.  Please call this clinic with any questions regarding today s appointment.      Nila Arteaga  Audiology Assistant  [facility]  [phone]

## 2021-04-02 ENCOUNTER — INFUSION THERAPY VISIT (OUTPATIENT)
Dept: INFUSION THERAPY | Facility: OTHER | Age: 71
End: 2021-04-02
Attending: INTERNAL MEDICINE
Payer: MEDICARE

## 2021-04-02 VITALS
DIASTOLIC BLOOD PRESSURE: 70 MMHG | WEIGHT: 169.09 LBS | HEIGHT: 63 IN | TEMPERATURE: 97.7 F | SYSTOLIC BLOOD PRESSURE: 120 MMHG | BODY MASS INDEX: 29.96 KG/M2 | OXYGEN SATURATION: 98 % | HEART RATE: 92 BPM | RESPIRATION RATE: 18 BRPM

## 2021-04-02 DIAGNOSIS — C90.00 MULTIPLE MYELOMA NOT HAVING ACHIEVED REMISSION (H): Primary | ICD-10-CM

## 2021-04-02 PROCEDURE — 96401 CHEMO ANTI-NEOPL SQ/IM: CPT

## 2021-04-02 PROCEDURE — 250N000011 HC RX IP 250 OP 636: Performed by: INTERNAL MEDICINE

## 2021-04-02 PROCEDURE — 250N000011 HC RX IP 250 OP 636: Mod: JW

## 2021-04-02 RX ADMIN — BORTEZOMIB 2.4 MG: 3.5 INJECTION, POWDER, LYOPHILIZED, FOR SOLUTION INTRAVENOUS; SUBCUTANEOUS at 11:05

## 2021-04-02 ASSESSMENT — PAIN SCALES - GENERAL: PAINLEVEL: NO PAIN (0)

## 2021-04-02 ASSESSMENT — MIFFLIN-ST. JEOR: SCORE: 1246

## 2021-04-02 NOTE — PROGRESS NOTES
Patient is 70 years old, here today for injection of Velcade per order of . Patient meets parameters for today's infusion. Patient identified with two identifiers, order verified, and verbal consent for today's infusion obtained from patient. Written consent for treatment is on file and valid.    Patient denies questions or concerns regarding injection and/or medication(s) being administered.    Independent dose check completed with UZMA Rodríguez.

## 2021-04-06 ENCOUNTER — INFUSION THERAPY VISIT (OUTPATIENT)
Dept: INFUSION THERAPY | Facility: OTHER | Age: 71
End: 2021-04-06
Attending: INTERNAL MEDICINE
Payer: MEDICARE

## 2021-04-06 VITALS
SYSTOLIC BLOOD PRESSURE: 128 MMHG | BODY MASS INDEX: 29.96 KG/M2 | HEIGHT: 63 IN | TEMPERATURE: 98.5 F | OXYGEN SATURATION: 95 % | DIASTOLIC BLOOD PRESSURE: 68 MMHG | WEIGHT: 169.09 LBS | HEART RATE: 93 BPM | RESPIRATION RATE: 18 BRPM

## 2021-04-06 DIAGNOSIS — C90.00 MULTIPLE MYELOMA NOT HAVING ACHIEVED REMISSION (H): Primary | ICD-10-CM

## 2021-04-06 LAB
BASOPHILS # BLD AUTO: 0 10E9/L (ref 0–0.2)
BASOPHILS NFR BLD AUTO: 0.5 %
DIFFERENTIAL METHOD BLD: NORMAL
EOSINOPHIL # BLD AUTO: 0.3 10E9/L (ref 0–0.7)
EOSINOPHIL NFR BLD AUTO: 4.7 %
ERYTHROCYTE [DISTWIDTH] IN BLOOD BY AUTOMATED COUNT: 13.9 % (ref 10–15)
HCT VFR BLD AUTO: 39.5 % (ref 35–47)
HGB BLD-MCNC: 13.5 G/DL (ref 11.7–15.7)
IMM GRANULOCYTES # BLD: 0.1 10E9/L (ref 0–0.4)
IMM GRANULOCYTES NFR BLD: 1.4 %
LYMPHOCYTES # BLD AUTO: 1.6 10E9/L (ref 0.8–5.3)
LYMPHOCYTES NFR BLD AUTO: 29.7 %
MCH RBC QN AUTO: 31.7 PG (ref 26.5–33)
MCHC RBC AUTO-ENTMCNC: 34.2 G/DL (ref 31.5–36.5)
MCV RBC AUTO: 93 FL (ref 78–100)
MONOCYTES # BLD AUTO: 1.1 10E9/L (ref 0–1.3)
MONOCYTES NFR BLD AUTO: 19.4 %
NEUTROPHILS # BLD AUTO: 2.4 10E9/L (ref 1.6–8.3)
NEUTROPHILS NFR BLD AUTO: 44.3 %
NRBC # BLD AUTO: 0 10*3/UL
NRBC BLD AUTO-RTO: 0 /100
PLATELET # BLD AUTO: 188 10E9/L (ref 150–450)
RBC # BLD AUTO: 4.26 10E12/L (ref 3.8–5.2)
WBC # BLD AUTO: 5.5 10E9/L (ref 4–11)

## 2021-04-06 PROCEDURE — 85025 COMPLETE CBC W/AUTO DIFF WBC: CPT | Mod: ZL | Performed by: INTERNAL MEDICINE

## 2021-04-06 PROCEDURE — 250N000011 HC RX IP 250 OP 636: Performed by: INTERNAL MEDICINE

## 2021-04-06 PROCEDURE — 36415 COLL VENOUS BLD VENIPUNCTURE: CPT | Mod: ZL | Performed by: INTERNAL MEDICINE

## 2021-04-06 PROCEDURE — 96401 CHEMO ANTI-NEOPL SQ/IM: CPT

## 2021-04-06 RX ADMIN — BORTEZOMIB 2.4 MG: 3.5 INJECTION, POWDER, LYOPHILIZED, FOR SOLUTION INTRAVENOUS; SUBCUTANEOUS at 13:09

## 2021-04-06 ASSESSMENT — MIFFLIN-ST. JEOR: SCORE: 1246

## 2021-04-06 ASSESSMENT — PAIN SCALES - GENERAL: PAINLEVEL: NO PAIN (0)

## 2021-04-06 NOTE — PROGRESS NOTES
Patient is 70 years old, here today for injection of Velcade per order of . Patient meets parameters for today's infusion. Patient identified with two identifiers, order verified, and verbal consent for today's infusion obtained from patient. Written consent for treatment is on file and valid.    Today's lab values: WBC: 5.5, HGB: 13.5, PLT: 188  ANC: 2.4.     Patient meets order parameters for today's treatment.    Patient denies questions or concerns regarding injection and/or medication(s) being administered.    Independent dose check completed with UZMA Rodríguez.    Velcade injected SQ into right upper outer arm at 45 degree angle per protocol rotating sites. Patient tolerated injection without incident, no signs or symptoms of adverse reaction noted. Patient denies pain or discomfort.     Covered with a sterile bandage. Pt instructed to leave bandage intact for a minimum of one hour, and to call with questions or concerns. Copy of appointments, discharge instructions, and after visit summary (AVS) provided to patient. Patient states understanding, discharged.

## 2021-04-09 ENCOUNTER — INFUSION THERAPY VISIT (OUTPATIENT)
Dept: INFUSION THERAPY | Facility: OTHER | Age: 71
End: 2021-04-09
Attending: INTERNAL MEDICINE
Payer: MEDICARE

## 2021-04-09 VITALS
DIASTOLIC BLOOD PRESSURE: 79 MMHG | BODY MASS INDEX: 29.96 KG/M2 | RESPIRATION RATE: 16 BRPM | SYSTOLIC BLOOD PRESSURE: 122 MMHG | OXYGEN SATURATION: 94 % | HEART RATE: 101 BPM | WEIGHT: 169.09 LBS | TEMPERATURE: 98.6 F

## 2021-04-09 DIAGNOSIS — C90.00 MULTIPLE MYELOMA NOT HAVING ACHIEVED REMISSION (H): Primary | ICD-10-CM

## 2021-04-09 LAB
BASOPHILS # BLD AUTO: 0 10E9/L (ref 0–0.2)
BASOPHILS NFR BLD AUTO: 0.5 %
DIFFERENTIAL METHOD BLD: NORMAL
EOSINOPHIL # BLD AUTO: 0.2 10E9/L (ref 0–0.7)
EOSINOPHIL NFR BLD AUTO: 3.6 %
ERYTHROCYTE [DISTWIDTH] IN BLOOD BY AUTOMATED COUNT: 13.8 % (ref 10–15)
HCT VFR BLD AUTO: 40.3 % (ref 35–47)
HGB BLD-MCNC: 13.9 G/DL (ref 11.7–15.7)
IMM GRANULOCYTES # BLD: 0.1 10E9/L (ref 0–0.4)
IMM GRANULOCYTES NFR BLD: 2.3 %
LYMPHOCYTES # BLD AUTO: 1.5 10E9/L (ref 0.8–5.3)
LYMPHOCYTES NFR BLD AUTO: 27.4 %
MCH RBC QN AUTO: 31.7 PG (ref 26.5–33)
MCHC RBC AUTO-ENTMCNC: 34.5 G/DL (ref 31.5–36.5)
MCV RBC AUTO: 92 FL (ref 78–100)
MONOCYTES # BLD AUTO: 1 10E9/L (ref 0–1.3)
MONOCYTES NFR BLD AUTO: 17.6 %
NEUTROPHILS # BLD AUTO: 2.7 10E9/L (ref 1.6–8.3)
NEUTROPHILS NFR BLD AUTO: 48.6 %
NRBC # BLD AUTO: 0 10*3/UL
NRBC BLD AUTO-RTO: 0 /100
PLATELET # BLD AUTO: 198 10E9/L (ref 150–450)
RBC # BLD AUTO: 4.39 10E12/L (ref 3.8–5.2)
WBC # BLD AUTO: 5.6 10E9/L (ref 4–11)

## 2021-04-09 PROCEDURE — 36415 COLL VENOUS BLD VENIPUNCTURE: CPT | Mod: ZL | Performed by: INTERNAL MEDICINE

## 2021-04-09 PROCEDURE — 85025 COMPLETE CBC W/AUTO DIFF WBC: CPT | Mod: ZL | Performed by: INTERNAL MEDICINE

## 2021-04-09 PROCEDURE — 250N000011 HC RX IP 250 OP 636: Performed by: INTERNAL MEDICINE

## 2021-04-09 PROCEDURE — 96401 CHEMO ANTI-NEOPL SQ/IM: CPT

## 2021-04-09 RX ADMIN — BORTEZOMIB 2.4 MG: 3.5 INJECTION, POWDER, LYOPHILIZED, FOR SOLUTION INTRAVENOUS; SUBCUTANEOUS at 13:57

## 2021-04-09 ASSESSMENT — PAIN SCALES - GENERAL: PAINLEVEL: NO PAIN (0)

## 2021-04-09 NOTE — PROGRESS NOTES
Patient is a 70 her today for injection of Velcade per order of . Patient identified with two identifiers, order verified, and verbal consent for today's infusion obtained from patient. Written consent for treatment is on file and valid.    Recent lab values: WBC: 5.6, HGB: 13.9, PLT: 198  ANC: 2.7.       Patient meets order parameters for today's treatment.    Velcade dose verified with Julia ADEN RN, prior to release of drug.      Patient denies questions or concerns regarding injection and/or medication(s) being administered.

## 2021-04-09 NOTE — PROGRESS NOTES
Subjective     Carmelita Watts is a 70 year old female who presents to clinic today for the following health issues:    HPI         Diabetes Follow-up      How often are you checking your blood sugar? Not at all    What concerns do you have today about your diabetes? None     Do you have any of these symptoms? (Select all that apply)  No numbness or tingling in feet.  No redness, sores or blisters on feet.  No complaints of excessive thirst.  No reports of blurry vision.  No significant changes to weight.    Have you had a diabetic eye exam in the last 12 months? No                Hyperlipidemia Follow-Up      Are you regularly taking any medication or supplement to lower your cholesterol?   Yes- Lipitor    Are you having muscle aches or other side effects that you think could be caused by your cholesterol lowering medication?  No    Hypertension Follow-up      Do you check your blood pressure regularly outside of the clinic? No     Are you following a low salt diet? Yes    Are your blood pressures ever more than 140 on the top number (systolic) OR more   than 90 on the bottom number (diastolic), for example 140/90? n/a    BP Readings from Last 2 Encounters:   04/19/21 120/84   04/09/21 122/79     Hemoglobin A1C (%)   Date Value   01/26/2021 6.3 (H)   10/19/2020 6.5 (H)     LDL Cholesterol Calculated (mg/dL)   Date Value   02/14/2020 125 (H)   01/03/2019 86     Depression and Anxiety Follow-Up    How are you doing with your depression since your last visit? No change    How are you doing with your anxiety since your last visit?  No change    Are you having other symptoms that might be associated with depression or anxiety? No    Have you had a significant life event? Health Concerns     Do you have any concerns with your use of alcohol or other drugs? No    Social History     Tobacco Use     Smoking status: Never Smoker     Smokeless tobacco: Never Used     Tobacco comment: Tried to Quit (YES); QUIT in 1971; Passive  Exposure (NO)   Substance Use Topics     Alcohol use: Yes     Comment: RARELY     Drug use: No     PHQ 5/27/2020 10/16/2020 4/19/2021   PHQ-9 Total Score 0 4 3   Q9: Thoughts of better off dead/self-harm past 2 weeks Not at all Not at all Not at all     MINAL-7 SCORE 11/14/2019 5/27/2020 10/16/2020   Total Score 3 0 1     Last PHQ-9 4/19/2021   1.  Little interest or pleasure in doing things 0   2.  Feeling down, depressed, or hopeless 0   3.  Trouble falling or staying asleep, or sleeping too much 1   4.  Feeling tired or having little energy 1   5.  Poor appetite or overeating 1   6.  Feeling bad about yourself 0   7.  Trouble concentrating 0   8.  Moving slowly or restless 0   Q9: Thoughts of better off dead/self-harm past 2 weeks 0   PHQ-9 Total Score 3   Difficulty at work, home, or with people Somewhat difficult       Suicide Assessment Five-step Evaluation and Treatment (SAFE-T)      How many servings of fruits and vegetables do you eat daily?  2-3    On average, how many sweetened beverages do you drink each day (Examples: soda, juice, sweet tea, etc.  Do NOT count diet or artificially sweetened beverages)?   0    How many days per week do you exercise enough to make your heart beat faster? 3 or less    How many minutes a day do you exercise enough to make your heart beat faster? 9 or less    How many days per week do you miss taking your medication? 0             Patient Active Problem List   Diagnosis     Hyperlipidemia LDL goal <130     Hypertension goal BP (blood pressure) < 140/80     Advanced care planning/counseling discussion     Major depressive disorder, recurrent episode, mild (H)     Seasonal allergies     Mixed hyperlipidemia     ACP (advance care planning)     Multiple myeloma not having achieved remission (H)     Diabetes mellitus, type 2 (H)     Past Surgical History:   Procedure Laterality Date     APPENDECTOMY       BONE MARROW BIOPSY, BONE SPECIMEN, NEEDLE/TROCAR N/A 6/18/2019    Procedure:  "BONE MARROW BIOPSY;  Surgeon: Maciej Sanz MD;  Location: [facility]     CHOLECYSTECTOMY       COLONOSCOPY  07-    repeat 10 years     COLONOSCOPY N/A 12/30/2016    Procedure: COLONOSCOPY;  Surgeon: Bhaskar Franklin DO;  Location: [facility]     SINUS SURGERY       TUBAL STERILIZATION         Social History     Tobacco Use     Smoking status: Never Smoker     Smokeless tobacco: Never Used     Tobacco comment: Tried to Quit (YES); QUIT in 1971; Passive Exposure (NO)   Substance Use Topics     Alcohol use: Yes     Comment: RARELY     Family History   Problem Relation Age of Onset     Breast Cancer Mother 66        Cause of Death     Parkinsonism Father         \"Possible\"     Coronary Artery Disease Father      Pacemaker Father      Thyroid Disease Daughter      Diabetes No family hx of      Hypertension No family hx of      Hyperlipidemia No family hx of      Cerebrovascular Disease No family hx of      Colon Cancer No family hx of      Prostate Cancer No family hx of      Genetic Disorder No family hx of      Asthma No family hx of      Anesthesia Reaction No family hx of          Current Outpatient Medications   Medication Sig Dispense Refill     acyclovir (ZOVIRAX) 400 MG tablet Take 1 tablet (400 mg) by mouth 2 times daily Viral Prophylaxis. 60 tablet 3     aspirin (ASA) 81 MG chewable tablet Take 81 mg by mouth daily       atorvastatin (LIPITOR) 10 MG tablet Take 1 tablet (10 mg) by mouth daily 90 tablet 3     dexamethasone (DECADRON) 4 MG tablet Take 5 tablets on Day 8 and Day 15. 30 tablet 0     EPINEPHrine (EPIPEN) 0.3 MG/0.3ML injection Inject 0.3 mg into the muscle once as needed. Use as needed for anaphylaxis.       fluticasone (FLONASE) 50 MCG/ACT nasal spray SPRAY 2 SPRAYS INTO BOTH NOSTRILS DAILY 48 mL 0     losartan (COZAAR) 50 MG tablet Take 1 tablet (50 mg) by mouth daily 90 tablet 3     prochlorperazine (COMPAZINE) 10 MG tablet Take 1 tablet (10 mg) by mouth every 6 hours as needed " (Nausea/Vomiting) 30 tablet 3     sertraline (ZOLOFT) 50 MG tablet TAKE 1 TABLET(50 MG) BY MOUTH DAILY 45 tablet 3     Allergies   Allergen Reactions     Lisinopril Cough     Phenylephrine Hcl Other (See Comments)     **Entex  HEADACHE (SEVERE)     Phenylpropanolamine Other (See Comments)     **Entex  HEADACHE (SEVERE)     Pseudoephedrine Tannate Other (See Comments)     **Entex  HEADACHE (SEVERE)     Levofloxacin Rash     **Levaquin     Moxifloxacin Hcl [Avelox] Rash     Recent Labs   Lab Test 03/23/21  1240 03/09/21  1238 02/23/21  1202 01/26/21  1002 10/19/20  1125 10/19/20  1125 02/14/20  0947 02/14/20  0947 01/03/19  1043 01/03/19  1043 11/17/17  0844 11/17/17  0844   A1C  --   --   --  6.3*  --  6.5*  --   --   --   --   --   --    LDL  --   --   --   --   --   --   --  125*  --  86  --  108*   HDL  --   --   --   --   --   --   --  44*  --  42*  --  60   TRIG  --   --   --   --   --   --   --  270*  --  126  --  78   ALT 21  --  22 20   < >  --    < >  --    < >  --    < > 27   CR 0.73 0.66 0.70 0.67   < >  --    < >  --    < >  --    < > 0.69   GFRESTIMATED 82 88 86 88   < >  --    < >  --    < >  --    < > 84   GFRESTBLACK >90 >90 >90 >90   < >  --    < >  --    < >  --    < > >90   POTASSIUM 4.0  --  3.9 3.9   < >  --    < >  --    < >  --    < > 4.1   TSH  --   --   --   --   --   --   --  1.86  --   --   --  1.77    < > = values in this interval not displayed.      BP Readings from Last 3 Encounters:   04/19/21 120/84   04/09/21 122/79   04/06/21 128/68    Wt Readings from Last 3 Encounters:   04/19/21 76.2 kg (168 lb)   04/09/21 76.7 kg (169 lb 1.5 oz)   04/06/21 76.7 kg (169 lb 1.5 oz)                    Reviewed and updated as needed this visit by Provider                 Review of Systems   CONSTITUTIONAL: NEGATIVE for fever, chills, change in weight  INTEGUMENTARY/SKIN: NEGATIVE for worrisome rashes, moles or lesions  EYES: NEGATIVE for vision changes or irritation  ENT/MOUTH: NEGATIVE for ear,  "mouth and throat problems  RESP: NEGATIVE for significant cough or SOB  CV: NEGATIVE for chest pain, palpitations or peripheral edema  GI: NEGATIVE for nausea, abdominal pain, heartburn, or change in bowel habits  : NEGATIVE for frequency, dysuria, or hematuria  MUSCULOSKELETAL: NEGATIVE for significant arthralgias or myalgia  NEURO: NEGATIVE for weakness, dizziness or paresthesias  ENDOCRINE: NEGATIVE for temperature intolerance, skin/hair changes  HEME: NEGATIVE for bleeding problems  PSYCHIATRIC: feeling like finding a sense of normal.       Objective    /84   Pulse 87   Temp 98.7  F (37.1  C)   Resp 16   Ht 1.6 m (5' 3\")   Wt 76.2 kg (168 lb)   SpO2 96%   BMI 29.76 kg/m    Body mass index is 29.76 kg/m .  Physical Exam   GENERAL: healthy, alert and no distress  EYES: Eyes grossly normal to inspection, PERRL and conjunctivae and sclerae normal  NECK: no adenopathy, no asymmetry, masses, or scars and thyroid normal to palpation  RESP: lungs clear to auscultation - no rales, rhonchi or wheezes  CV: regular rate and rhythm, normal S1 S2, no S3 or S4, no murmur, click or rub, no peripheral edema and peripheral pulses strong  ABDOMEN: soft, nontender, no hepatosplenomegaly, no masses and bowel sounds normal  MS: no gross musculoskeletal defects noted, no edema  SKIN: no suspicious lesions or rashes  PSYCH: mentation appears normal, affect normal/bright  Diabetic foot exam: normal DP and PT pulses, no trophic changes or ulcerative lesions and normal sensory exam    Diagnostic Test Results:  Labs reviewed in Epic  Orders Only on 04/13/2021   Component Date Value Ref Range Status     Kappa Free Lt Chain 04/13/2021 0.48  0.33 - 1.94 mg/dL Final     Lambda Free Lt Chain 04/13/2021 0.21* 0.57 - 2.63 mg/dL Final     Kappa Lambda Ratio 04/13/2021 2.29* 0.26 - 1.65 Final     Immunofixation ELP 04/13/2021 (Note)   Final    Comment: Monoclonal IgG immunoglobulin of kappa light chain type. Monoclonal  antibody " therapeutics (e.g. Daratumumab) may appear as monoclonal  proteins on serum electrophoresis and immunofixation, although  usually very small. Results should be interpreted with caution if  the patient is receiving monoclonal  antibody therapy. Pathological significance requires clinical  correlation.  BIA Izaguirre M.D., Ph.D.([phone])       IGG 04/13/2021 1,499  610 - 1,616 mg/dL Final     IGA 04/13/2021 10* 84 - 499 mg/dL Final     IGM 04/13/2021 23* 35 - 242 mg/dL Final     Albumin Fraction 04/13/2021 4.2  3.7 - 5.1 g/dL Final     Alpha 1 Fraction 04/13/2021 0.3  0.2 - 0.4 g/dL Final     Alpha 2 Fraction 04/13/2021 0.8  0.5 - 0.9 g/dL Final     Beta Fraction 04/13/2021 0.7  0.6 - 1.0 g/dL Final     Gamma Fraction 04/13/2021 1.3  0.7 - 1.6 g/dL Final     Monoclonal Peak 04/13/2021 1.1* 0.0 g/dL Final     ELP Interpretation: 04/13/2021    Final                    Value:Monoclonal protein (1.1 g/dL) seen in the gamma fraction.  See immunofixation report on   same specimen.  Pathologic significance requires clinical correlation.  BIA Izaguirre M.D., Ph.D., Pathologist ().       Lab Results   Component Value Date    WBC 5.6 04/09/2021     Lab Results   Component Value Date    RBC 4.39 04/09/2021     Lab Results   Component Value Date    HGB 13.9 04/09/2021     Lab Results   Component Value Date    HCT 40.3 04/09/2021     No components found for: MCT  Lab Results   Component Value Date    MCV 92 04/09/2021     Lab Results   Component Value Date    MCH 31.7 04/09/2021     Lab Results   Component Value Date    MCHC 34.5 04/09/2021     Lab Results   Component Value Date    RDW 13.8 04/09/2021     Lab Results   Component Value Date     04/09/2021     Last Comprehensive Metabolic Panel:  Sodium   Date Value Ref Range Status   03/23/2021 136 133 - 144 mmol/L Final     Potassium   Date Value Ref Range Status   03/23/2021 4.0 3.4 - 5.3 mmol/L Final     Chloride   Date Value Ref Range Status    03/23/2021 106 94 - 109 mmol/L Final     Carbon Dioxide   Date Value Ref Range Status   03/23/2021 26 20 - 32 mmol/L Final     Anion Gap   Date Value Ref Range Status   03/23/2021 4 3 - 14 mmol/L Final     Glucose   Date Value Ref Range Status   03/23/2021 121 (H) 70 - 99 mg/dL Final     Urea Nitrogen   Date Value Ref Range Status   03/23/2021 16 7 - 30 mg/dL Final     Creatinine   Date Value Ref Range Status   03/23/2021 0.73 0.52 - 1.04 mg/dL Final     GFR Estimate   Date Value Ref Range Status   03/23/2021 82 >60 mL/min/[1.73_m2] Final     Comment:     Non  GFR Calc  Starting 12/18/2018, serum creatinine based estimated GFR (eGFR) will be   calculated using the Chronic Kidney Disease Epidemiology Collaboration   (CKD-EPI) equation.       Calcium   Date Value Ref Range Status   03/23/2021 8.9 8.5 - 10.1 mg/dL Final     Bilirubin Total   Date Value Ref Range Status   03/23/2021 0.4 0.2 - 1.3 mg/dL Final     Alkaline Phosphatase   Date Value Ref Range Status   03/23/2021 115 40 - 150 U/L Final     ALT   Date Value Ref Range Status   03/23/2021 21 0 - 50 U/L Final     AST   Date Value Ref Range Status   03/23/2021 13 0 - 45 U/L Final                  Assessment & Plan     1. Type 2 diabetes mellitus without complication, without long-term current use of insulin (H)  She is due for labs .hasn't had any high or lows in sugar.  Eating very well.   Is being treated for multiple myeloma. Energy some days is low. Has her grand daughter living with her now. Does help her with meals and other things that help, has been to see her eye MD. But she is showing no concerns on exam.     - Hemoglobin A1c; Future  - Albumin Random Urine Quantitative with Creat Ratio; Future    2. Hypertension goal BP (blood pressure) < 140/80  Pressure is perfect and her weight is very stable. Watches salt.     3. Mixed hyperlipidemia  Tolerating medications.  Good numbers noted.   - atorvastatin (LIPITOR) 10 MG tablet; Take 1  tablet (10 mg) by mouth daily  Dispense: 90 tablet; Refill: 3  - Lipid Profile; Future    4. Essential hypertension with goal blood pressure less than 140/90  As above and refill is given.   - losartan (COZAAR) 50 MG tablet; Take 1 tablet (50 mg) by mouth daily  Dispense: 90 tablet; Refill: 3          We see her every 3 months and she feels that  keeps her on track.   See Patient Instructions    No follow-ups on file.    VENKATESH Trevino  [facility] - [facility]

## 2021-04-09 NOTE — PROGRESS NOTES
Velcade injected SQ into left upper outer arm per protocol rotating sites.     Injection administered per protocol. Patient tolerated infusion without incident, no signs or symptoms of adverse reaction noted. Patient denies pain or discomfort.     Covered with a sterile bandage. Pt instructed to leave bandage intact for a minimum of one hour, and to call with questions or concerns. Copy of appointments, discharge instructions, and after visit summary (AVS) provided to patient. Patient states understanding, discharged ambulatory.

## 2021-04-10 ENCOUNTER — DOCUMENTATION ONLY (OUTPATIENT)
Dept: ONCOLOGY | Facility: OTHER | Age: 71
End: 2021-04-10

## 2021-04-10 NOTE — PROGRESS NOTES
[facility] / [facility] Chemotherapy Consult    The inpatient pharmacist has been consulted to review the patient's chart for  the following: any significant drug interactions between home medications, new take home medications, pre-medications, PRN medications, chemotherapy agents, and chemotherapy regimen.     Current Outpatient Medications   Medication Sig Dispense Refill     acyclovir (ZOVIRAX) 400 MG tablet Take 1 tablet (400 mg) by mouth 2 times daily Viral Prophylaxis. 60 tablet 3     aspirin (ASA) 81 MG chewable tablet Take 81 mg by mouth daily       atorvastatin (LIPITOR) 10 MG tablet Take 1 tablet (10 mg) by mouth daily 90 tablet 3     dexamethasone (DECADRON) 4 MG tablet Take 5 tablets on Day 8 and Day 15. 30 tablet 0     EPINEPHrine (EPIPEN) 0.3 MG/0.3ML injection Inject 0.3 mg into the muscle once as needed. Use as needed for anaphylaxis.       fluticasone (FLONASE) 50 MCG/ACT nasal spray SPRAY 2 SPRAYS INTO BOTH NOSTRILS DAILY 48 mL 0     losartan (COZAAR) 50 MG tablet Take 1 tablet (50 mg) by mouth daily 90 tablet 3     prochlorperazine (COMPAZINE) 10 MG tablet Take 1 tablet (10 mg) by mouth every 6 hours as needed (Nausea/Vomiting) (Patient not taking: Reported on 12/29/2020) 30 tablet 3     sertraline (ZOLOFT) 50 MG tablet TAKE 1 TABLET(50 MG) BY MOUTH DAILY 45 tablet 3     Pre-Treats: dex, diphenhydramine, acetaminophen    PRN medications: lorazepam, famotidine     Chemotherapy agents: bortezomib (Velcade), daratumumab-hyaluronidase-fihj (Darzalex Faspro)    Cancer Being Treated: multiple myeloma     Difference in Regimen Ordered vs. Standard Regimen: -    The following interactions were found and will require patient education:     Drug-Drug interactions:   1. Aspirin & sertraline: may result in an increased risk of bleeding  2. Prochlorperazine & sertraline: may result in increased risk of QT-interval prolongation  3. Aspirin & dexamethasone: may result in an increased  risk of GI ulceration    Drug-Food interactions:   1. Atorvastatin and Grapefruit Juice: may result in increased bioavailability of atorvastatin resulting in an increased risk of myopathy or rhabdomyolysis   2. Fluticasone & Grapefruit Juice: may result in increased fluticasone exposure   3. Sertraline & Grapefruit Juice: may result in elevated sertraline serum concentrations and an increased risk of adverse side effects     Drug-Ethanol interactions:   1. Aspirin: May result in increased risk of GI bleeding  2. Prochlorperazine: May result in increased central nervous system depression and an increased risk of extrapyramidal reactions   3. Sertraline: May result in an increased risk of impairment of mental and motor skills     If there are any additional questions or concerns, please contact the inpatient pharmacy ([phone]) for further review.     Allie Caceres RPH  April 10, 2021

## 2021-04-13 DIAGNOSIS — C90.00 MULTIPLE MYELOMA NOT HAVING ACHIEVED REMISSION (H): ICD-10-CM

## 2021-04-13 PROCEDURE — 83883 ASSAY NEPHELOMETRY NOT SPEC: CPT | Mod: ZL | Performed by: NURSE PRACTITIONER

## 2021-04-13 PROCEDURE — 82784 ASSAY IGA/IGD/IGG/IGM EACH: CPT | Mod: ZL | Performed by: NURSE PRACTITIONER

## 2021-04-13 PROCEDURE — 36415 COLL VENOUS BLD VENIPUNCTURE: CPT | Mod: ZL | Performed by: NURSE PRACTITIONER

## 2021-04-13 PROCEDURE — 999N001036 HC STATISTIC TOTAL PROTEIN: Mod: ZL | Performed by: NURSE PRACTITIONER

## 2021-04-13 PROCEDURE — 84165 PROTEIN E-PHORESIS SERUM: CPT | Mod: TC,ZL | Performed by: NURSE PRACTITIONER

## 2021-04-13 PROCEDURE — 86334 IMMUNOFIX E-PHORESIS SERUM: CPT | Mod: TC,ZL | Performed by: NURSE PRACTITIONER

## 2021-04-14 LAB
ALBUMIN SERPL ELPH-MCNC: 4.2 G/DL (ref 3.7–5.1)
ALPHA1 GLOB SERPL ELPH-MCNC: 0.3 G/DL (ref 0.2–0.4)
ALPHA2 GLOB SERPL ELPH-MCNC: 0.8 G/DL (ref 0.5–0.9)
B-GLOBULIN SERPL ELPH-MCNC: 0.7 G/DL (ref 0.6–1)
GAMMA GLOB SERPL ELPH-MCNC: 1.3 G/DL (ref 0.7–1.6)
IGA SERPL-MCNC: 10 MG/DL (ref 84–499)
IGG SERPL-MCNC: 1499 MG/DL (ref 610–1616)
IGM SERPL-MCNC: 23 MG/DL (ref 35–242)
KAPPA LC UR-MCNC: 0.48 MG/DL (ref 0.33–1.94)
KAPPA LC/LAMBDA SER: 2.29 {RATIO} (ref 0.26–1.65)
LAMBDA LC SERPL-MCNC: 0.21 MG/DL (ref 0.57–2.63)
M PROTEIN SERPL ELPH-MCNC: 1.1 G/DL
PROT PATTERN SERPL ELPH-IMP: ABNORMAL
PROT PATTERN SERPL IFE-IMP: ABNORMAL

## 2021-04-16 RX ORDER — SODIUM CHLORIDE 9 MG/ML
1000 INJECTION, SOLUTION INTRAVENOUS CONTINUOUS PRN
Status: CANCELLED
Start: 2021-04-27

## 2021-04-16 RX ORDER — MEPERIDINE HYDROCHLORIDE 25 MG/ML
25 INJECTION INTRAMUSCULAR; INTRAVENOUS; SUBCUTANEOUS EVERY 30 MIN PRN
Status: CANCELLED | OUTPATIENT
Start: 2021-04-30

## 2021-04-16 RX ORDER — ALBUTEROL SULFATE 0.83 MG/ML
2.5 SOLUTION RESPIRATORY (INHALATION)
Status: CANCELLED | OUTPATIENT
Start: 2021-04-27

## 2021-04-16 RX ORDER — MEPERIDINE HYDROCHLORIDE 25 MG/ML
25 INJECTION INTRAMUSCULAR; INTRAVENOUS; SUBCUTANEOUS EVERY 30 MIN PRN
Status: CANCELLED | OUTPATIENT
Start: 2021-04-20

## 2021-04-16 RX ORDER — SODIUM CHLORIDE 9 MG/ML
1000 INJECTION, SOLUTION INTRAVENOUS CONTINUOUS PRN
Status: CANCELLED
Start: 2021-04-30

## 2021-04-16 RX ORDER — METHYLPREDNISOLONE SODIUM SUCCINATE 125 MG/2ML
125 INJECTION, POWDER, LYOPHILIZED, FOR SOLUTION INTRAMUSCULAR; INTRAVENOUS
Status: CANCELLED
Start: 2021-04-20

## 2021-04-16 RX ORDER — DIPHENHYDRAMINE HYDROCHLORIDE 50 MG/ML
50 INJECTION INTRAMUSCULAR; INTRAVENOUS
Status: CANCELLED
Start: 2021-04-23

## 2021-04-16 RX ORDER — EPINEPHRINE 1 MG/ML
0.3 INJECTION, SOLUTION, CONCENTRATE INTRAVENOUS EVERY 5 MIN PRN
Status: CANCELLED | OUTPATIENT
Start: 2021-04-27

## 2021-04-16 RX ORDER — NALOXONE HYDROCHLORIDE 0.4 MG/ML
.1-.4 INJECTION, SOLUTION INTRAMUSCULAR; INTRAVENOUS; SUBCUTANEOUS
Status: CANCELLED | OUTPATIENT
Start: 2021-04-20

## 2021-04-16 RX ORDER — HEPARIN SODIUM (PORCINE) LOCK FLUSH IV SOLN 100 UNIT/ML 100 UNIT/ML
5 SOLUTION INTRAVENOUS
Status: CANCELLED | OUTPATIENT
Start: 2021-04-30

## 2021-04-16 RX ORDER — EPINEPHRINE 1 MG/ML
0.3 INJECTION, SOLUTION, CONCENTRATE INTRAVENOUS EVERY 5 MIN PRN
Status: CANCELLED | OUTPATIENT
Start: 2021-04-30

## 2021-04-16 RX ORDER — HEPARIN SODIUM,PORCINE 10 UNIT/ML
5 VIAL (ML) INTRAVENOUS
Status: CANCELLED | OUTPATIENT
Start: 2021-04-23

## 2021-04-16 RX ORDER — SODIUM CHLORIDE 9 MG/ML
1000 INJECTION, SOLUTION INTRAVENOUS CONTINUOUS PRN
Status: CANCELLED
Start: 2021-04-23

## 2021-04-16 RX ORDER — LORAZEPAM 2 MG/ML
0.5 INJECTION INTRAMUSCULAR EVERY 4 HOURS PRN
Status: CANCELLED | OUTPATIENT
Start: 2021-04-27

## 2021-04-16 RX ORDER — ALBUTEROL SULFATE 0.83 MG/ML
2.5 SOLUTION RESPIRATORY (INHALATION)
Status: CANCELLED | OUTPATIENT
Start: 2021-04-30

## 2021-04-16 RX ORDER — NALOXONE HYDROCHLORIDE 0.4 MG/ML
.1-.4 INJECTION, SOLUTION INTRAMUSCULAR; INTRAVENOUS; SUBCUTANEOUS
Status: CANCELLED | OUTPATIENT
Start: 2021-04-23

## 2021-04-16 RX ORDER — EPINEPHRINE 1 MG/ML
0.3 INJECTION, SOLUTION, CONCENTRATE INTRAVENOUS EVERY 5 MIN PRN
Status: CANCELLED | OUTPATIENT
Start: 2021-04-20

## 2021-04-16 RX ORDER — ALBUTEROL SULFATE 90 UG/1
1-2 AEROSOL, METERED RESPIRATORY (INHALATION)
Status: CANCELLED
Start: 2021-04-30

## 2021-04-16 RX ORDER — ALBUTEROL SULFATE 90 UG/1
1-2 AEROSOL, METERED RESPIRATORY (INHALATION)
Status: CANCELLED
Start: 2021-04-23

## 2021-04-16 RX ORDER — LORAZEPAM 2 MG/ML
0.5 INJECTION INTRAMUSCULAR EVERY 4 HOURS PRN
Status: CANCELLED | OUTPATIENT
Start: 2021-04-30

## 2021-04-16 RX ORDER — SODIUM CHLORIDE 9 MG/ML
1000 INJECTION, SOLUTION INTRAVENOUS CONTINUOUS PRN
Status: CANCELLED
Start: 2021-04-20

## 2021-04-16 RX ORDER — HEPARIN SODIUM,PORCINE 10 UNIT/ML
5 VIAL (ML) INTRAVENOUS
Status: CANCELLED | OUTPATIENT
Start: 2021-04-20

## 2021-04-16 RX ORDER — EPINEPHRINE 1 MG/ML
0.3 INJECTION, SOLUTION, CONCENTRATE INTRAVENOUS EVERY 5 MIN PRN
Status: CANCELLED | OUTPATIENT
Start: 2021-04-23

## 2021-04-16 RX ORDER — METHYLPREDNISOLONE SODIUM SUCCINATE 125 MG/2ML
125 INJECTION, POWDER, LYOPHILIZED, FOR SOLUTION INTRAMUSCULAR; INTRAVENOUS
Status: CANCELLED
Start: 2021-04-23

## 2021-04-16 RX ORDER — LORAZEPAM 2 MG/ML
0.5 INJECTION INTRAMUSCULAR EVERY 4 HOURS PRN
Status: CANCELLED | OUTPATIENT
Start: 2021-04-20

## 2021-04-16 RX ORDER — ALBUTEROL SULFATE 90 UG/1
1-2 AEROSOL, METERED RESPIRATORY (INHALATION)
Status: CANCELLED
Start: 2021-04-20

## 2021-04-16 RX ORDER — LORAZEPAM 2 MG/ML
0.5 INJECTION INTRAMUSCULAR EVERY 4 HOURS PRN
Status: CANCELLED | OUTPATIENT
Start: 2021-04-23

## 2021-04-16 RX ORDER — ALBUTEROL SULFATE 0.83 MG/ML
2.5 SOLUTION RESPIRATORY (INHALATION)
Status: CANCELLED | OUTPATIENT
Start: 2021-04-23

## 2021-04-16 RX ORDER — DIPHENHYDRAMINE HCL 50 MG
50 CAPSULE ORAL ONCE
Status: CANCELLED | OUTPATIENT
Start: 2021-04-20

## 2021-04-16 RX ORDER — METHYLPREDNISOLONE SODIUM SUCCINATE 125 MG/2ML
125 INJECTION, POWDER, LYOPHILIZED, FOR SOLUTION INTRAMUSCULAR; INTRAVENOUS
Status: CANCELLED
Start: 2021-04-27

## 2021-04-16 RX ORDER — HEPARIN SODIUM (PORCINE) LOCK FLUSH IV SOLN 100 UNIT/ML 100 UNIT/ML
5 SOLUTION INTRAVENOUS
Status: CANCELLED | OUTPATIENT
Start: 2021-04-20

## 2021-04-16 RX ORDER — HEPARIN SODIUM (PORCINE) LOCK FLUSH IV SOLN 100 UNIT/ML 100 UNIT/ML
5 SOLUTION INTRAVENOUS
Status: CANCELLED | OUTPATIENT
Start: 2021-04-27

## 2021-04-16 RX ORDER — DIPHENHYDRAMINE HYDROCHLORIDE 50 MG/ML
50 INJECTION INTRAMUSCULAR; INTRAVENOUS
Status: CANCELLED
Start: 2021-04-30

## 2021-04-16 RX ORDER — DIPHENHYDRAMINE HYDROCHLORIDE 50 MG/ML
50 INJECTION INTRAMUSCULAR; INTRAVENOUS
Status: CANCELLED
Start: 2021-04-27

## 2021-04-16 RX ORDER — METHYLPREDNISOLONE SODIUM SUCCINATE 125 MG/2ML
125 INJECTION, POWDER, LYOPHILIZED, FOR SOLUTION INTRAMUSCULAR; INTRAVENOUS
Status: CANCELLED
Start: 2021-04-30

## 2021-04-16 RX ORDER — NALOXONE HYDROCHLORIDE 0.4 MG/ML
.1-.4 INJECTION, SOLUTION INTRAMUSCULAR; INTRAVENOUS; SUBCUTANEOUS
Status: CANCELLED | OUTPATIENT
Start: 2021-04-27

## 2021-04-16 RX ORDER — MEPERIDINE HYDROCHLORIDE 25 MG/ML
25 INJECTION INTRAMUSCULAR; INTRAVENOUS; SUBCUTANEOUS EVERY 30 MIN PRN
Status: CANCELLED | OUTPATIENT
Start: 2021-04-23

## 2021-04-16 RX ORDER — DEXAMETHASONE 4 MG/1
20 TABLET ORAL ONCE
Status: CANCELLED | OUTPATIENT
Start: 2021-04-20

## 2021-04-16 RX ORDER — DIPHENHYDRAMINE HYDROCHLORIDE 50 MG/ML
50 INJECTION INTRAMUSCULAR; INTRAVENOUS
Status: CANCELLED
Start: 2021-04-20

## 2021-04-16 RX ORDER — NALOXONE HYDROCHLORIDE 0.4 MG/ML
.1-.4 INJECTION, SOLUTION INTRAMUSCULAR; INTRAVENOUS; SUBCUTANEOUS
Status: CANCELLED | OUTPATIENT
Start: 2021-04-30

## 2021-04-16 RX ORDER — ALBUTEROL SULFATE 0.83 MG/ML
2.5 SOLUTION RESPIRATORY (INHALATION)
Status: CANCELLED | OUTPATIENT
Start: 2021-04-20

## 2021-04-16 RX ORDER — ALBUTEROL SULFATE 90 UG/1
1-2 AEROSOL, METERED RESPIRATORY (INHALATION)
Status: CANCELLED
Start: 2021-04-27

## 2021-04-16 RX ORDER — ACETAMINOPHEN 325 MG/1
650 TABLET ORAL ONCE
Status: CANCELLED | OUTPATIENT
Start: 2021-04-20

## 2021-04-16 RX ORDER — HEPARIN SODIUM,PORCINE 10 UNIT/ML
5 VIAL (ML) INTRAVENOUS
Status: CANCELLED | OUTPATIENT
Start: 2021-04-27

## 2021-04-16 RX ORDER — MEPERIDINE HYDROCHLORIDE 25 MG/ML
25 INJECTION INTRAMUSCULAR; INTRAVENOUS; SUBCUTANEOUS EVERY 30 MIN PRN
Status: CANCELLED | OUTPATIENT
Start: 2021-04-27

## 2021-04-16 RX ORDER — HEPARIN SODIUM,PORCINE 10 UNIT/ML
5 VIAL (ML) INTRAVENOUS
Status: CANCELLED | OUTPATIENT
Start: 2021-04-30

## 2021-04-16 RX ORDER — HEPARIN SODIUM (PORCINE) LOCK FLUSH IV SOLN 100 UNIT/ML 100 UNIT/ML
5 SOLUTION INTRAVENOUS
Status: CANCELLED | OUTPATIENT
Start: 2021-04-23

## 2021-04-16 NOTE — PROGRESS NOTES
Oncology Follow-up Visit:  April 20, 2021    Reason for Visit:  Patient presents with:  RECHECK: follow up multiple myeloma     Nursing Note and documentation reviewed: yes    HPI:  This is a 70-year-old female patient who presents to the oncology clinic today for evaluation prior to receiving cycle 6 day 1 therapy for Stage II-RISS IgG multiple myeloma diagnosed June 2019.  She progressed on Velcade and dexamethasone and CyBorD and treated was changed to Darzalex faspro injection. M-spike went from 1.0 to 1.2 in March so Velcade and dex were added to treatment plan.  She has received 1 cycle of this thus far.    She presents to the clinic today with no new complaints.  She is feeling good.  She does have some mild fatigue.  She will be attending a retreat in the middle of May and would like to discuss her schedule.    She had a recent eye appointment with no issues.  She follows with a dentist every 6 months.  She saw her PCP yesterday for an annual exam.     Oncologic History:     12/31/2018   patient presented to the emergency room with vertigo and fatigue.  CT scan of the head was negative and subsequent stress test was negative.  5/3/2019  She was seen by her PCP who ordered lab work and noted a total protein of 12.9.  SPEP at that time showed an M spike of 6.2 with a large monoclonal protein seen in the gamma fraction.  Urine immunofixation showed a possible small protein band in the gamma fraction  5/31/2019  she was evaluated by Dr. Walker with Medical Oncology with plan to rule out myeloma and obtain bone marrow aspiration biopsy as well as a metastatic bone survey along with additional labwork  6/18/2019 she underwent bone marrow aspiration and biopsy  6/24/2019  She was seen again by Dr. Walker and CBC showed a hemoglobin of 9.3, M spike 7.3 with monoclonal IgG immunoglobulin of kappa light chain type; serum viscosity was 2.9; quantitative immunoglobulins showed an IgG of 8160, beta-2 microglobulin  was 5.8, BUN was 21 with creatinine is 0.8 and total protein was 13.7.  Quantitative kappa/lambda free light chains showed an elevated ratio of 17.0 bone marrow aspiration biopsy showed plasma cell myeloma with approximately 80% plasma cells.  Immunofixation showed IgG kappa and flow cytometry revealed kappa monotypic plasma cells consistent with clonal plasma cell neoplasm and FISH panel was pending at that time.  It was felt she had at least stage II disease based on her beta-2 microglobulin and anemia.  Plan was to treat with Velcade 1.3 mg per metered squared days 1, 4, 8 and 11/Decadron 40 mg on days 1, 8 and 15 initiation of Revlimid with the second cycle at 25 mg daily days 1 through 14.  Plan was to also obtain an MRI of the lumbar spine to rule out lytic lesion at L3.  She was initiated on Zovirax 400 mg p.o. twice daily.  6/25/2019  1st cycle of chemotherapy initiated  7/1/2019 note in chart regarding patient's large co-pay for the Revlimid and no plan at this point to initiate Revlimid and treat only with Velcade and Decadron per Dr. Walker  7/11/2019  MRI lumbar spine shows a pathologic superior endplate compression fracture at L3 without evidence of retropulsed fragment and innumerable enhancing lesions throughout the lumbar spine consistent with history of multiple myeloma.  9/10/2019  Increasing M-spike and kappa lambda ratio  10/1/2019  Initiation of CyBorD  12/1/2020  Initiation of Darzalex Faspro injection  3/23/2021  M-spike increased form 1.0 to 1.2 and velcade and dexamethasone added to plan     Current Chemo Regime/TX:  Darzalex faspro injection 1800mg subcutaneous per protocol with velcade 1.3mg/m2 and dexamethasone 20mg added to day 4, 8, 11 with cycle given every 21 days      **Zometa 4mg every 3 months  Current Cycle: 6 day 1 (cycle 5 added velcade dex)  # of completed cycles:  5     Previous treatment:   Velcade 1.3 mg/m2 days 1, 4, 8 and 11 with Decadron 40 mg days 1, 8 and 15 x 4  "cycles;   Velcade 1.5mg.m2/cyclophosphamide 150mg every 7 days on days 1,8,15 and 22/decadron 40mg days 1,8,15,22 ; Darzalex faspro injection 1800mg subcutaneous per protocol     Past Medical History:   Diagnosis Date     Arthritis      Depressive disorder      Diabetes mellitus, type 2 (H) 1/18/2021     Essential hypertension 10/1/2015     Major depressive disorder, recurrent episode, mild (H) 10/1/2015     Mixed hyperlipidemia 10/1/2015     Multiple myeloma not having achieved remission (H) 6/24/2019     Other specified disorders of bladder 07/09/2012    Irritable Bladder     Seasonal allergies 10/1/2015     Unspecified essential hypertension 03/19/2007     Unspecified sinusitis (chronic) 09/05/2007       Past Surgical History:   Procedure Laterality Date     APPENDECTOMY       BONE MARROW BIOPSY, BONE SPECIMEN, NEEDLE/TROCAR N/A 6/18/2019    Procedure: BONE MARROW BIOPSY;  Surgeon: Maciej Sanz MD;  Location: [facility]     CHOLECYSTECTOMY       COLONOSCOPY  07-    repeat 10 years     COLONOSCOPY N/A 12/30/2016    Procedure: COLONOSCOPY;  Surgeon: Bhaskar Franklin DO;  Location: [facility]     SINUS SURGERY       TUBAL STERILIZATION         Family History   Problem Relation Age of Onset     Breast Cancer Mother 66        Cause of Death     Parkinsonism Father         \"Possible\"     Coronary Artery Disease Father      Pacemaker Father      Thyroid Disease Daughter      Diabetes No family hx of      Hypertension No family hx of      Hyperlipidemia No family hx of      Cerebrovascular Disease No family hx of      Colon Cancer No family hx of      Prostate Cancer No family hx of      Genetic Disorder No family hx of      Asthma No family hx of      Anesthesia Reaction No family hx of        Social History     Socioeconomic History     Marital status:      Spouse name: Not on file     Number of children: Not on file     Years of education: Not on file     Highest education level: Not on file "   Occupational History     Occupation: Financial     Comment:  - (FT)   Social Needs     Financial resource strain: Not on file     Food insecurity     Worry: Not on file     Inability: Not on file     Transportation needs     Medical: Not on file     Non-medical: Not on file   Tobacco Use     Smoking status: Never Smoker     Smokeless tobacco: Never Used     Tobacco comment: Tried to Quit (YES); QUIT in 1971; Passive Exposure (NO)   Substance and Sexual Activity     Alcohol use: Yes     Comment: RARELY     Drug use: No     Sexual activity: Yes     Partners: Male     Birth control/protection: None   Lifestyle     Physical activity     Days per week: Not on file     Minutes per session: Not on file     Stress: Not on file   Relationships     Social connections     Talks on phone: Not on file     Gets together: Not on file     Attends Yarsanism service: Not on file     Active member of club or organization: Not on file     Attends meetings of clubs or organizations: Not on file     Relationship status: Not on file     Intimate partner violence     Fear of current or ex partner: Not on file     Emotionally abused: Not on file     Physically abused: Not on file     Forced sexual activity: Not on file   Other Topics Concern      Service Not Asked     Blood Transfusions Not Asked     Caffeine Concern Yes     Comment: Coffee >6 cups daily     Occupational Exposure Not Asked     Hobby Hazards Not Asked     Sleep Concern Not Asked     Stress Concern Not Asked     Weight Concern Not Asked     Special Diet Not Asked     Back Care Not Asked     Exercise Not Asked     Bike Helmet Not Asked     Seat Belt Not Asked     Self-Exams Not Asked     Parent/sibling w/ CABG, MI or angioplasty before 65F 55M? No   Social History Narrative     Not on file       Current Outpatient Medications   Medication     acyclovir (ZOVIRAX) 400 MG tablet     aspirin (ASA) 81 MG chewable tablet     atorvastatin (LIPITOR) 10 MG  "tablet     dexamethasone (DECADRON) 4 MG tablet     fluticasone (FLONASE) 50 MCG/ACT nasal spray     losartan (COZAAR) 50 MG tablet     sertraline (ZOLOFT) 50 MG tablet     EPINEPHrine (EPIPEN) 0.3 MG/0.3ML injection     prochlorperazine (COMPAZINE) 10 MG tablet     No current facility-administered medications for this visit.         Allergies   Allergen Reactions     Lisinopril Cough     Phenylephrine Hcl Other (See Comments)     **Entex  HEADACHE (SEVERE)     Phenylpropanolamine Other (See Comments)     **Entex  HEADACHE (SEVERE)     Pseudoephedrine Tannate Other (See Comments)     **Entex  HEADACHE (SEVERE)     Levofloxacin Rash     **Levaquin     Moxifloxacin Hcl [Avelox] Rash       Review Of Systems:  Constitutional:    denies fever, weight changes, chills, and night sweats.  Eyes:    denies blurred or double vision  Ears/Nose/Throat:   denies ear pain, nose problems, difficulty swallowing  Respiratory:   denies shortness of breath, cough   Skin:   denies rash, lesions  Cardiovascular:   denies chest pain, palpitations, edema  Gastrointestinal:   denies abdominal pain, bloating, nausea, early satiety; no change in bowel habits or blood in stool  Genitourinary:   denies difficulty with urination, blood in urine  Musculoskeletal:    denies new muscle pain, bone pain  Neurologic:   denies lightheadedness, headaches, numbness or tingling  Psychiatric:   denies anxiety, depression  Hematologic/Lymphatic/Immunologic:   denies easy bruising, easy bleeding, lumps or bumps noted  Endocrine:   Denies increased thirst      ECOG Performance Status: 1    Physical Exam:  /77   Pulse 81   Temp 98.6  F (37  C) (Tympanic)   Resp 16   Ht 1.6 m (5' 3\")   Wt 76.5 kg (168 lb 10.4 oz)   SpO2 95%   BMI 29.88 kg/m      GENERAL APPEARANCE: Healthy, alert and in no acute distress.  HEENT: Normocephalic, Sclerae anicteric.   NECK:   No asymmetry or masses, no thyromegaly.  LYMPHATICS: No palpable cervical, supraclavicular, " axillary, or inguinal nodes   RESP: Lungs clear to auscultation bilaterally, respirations regular and easy  CARDIOVASCULAR: Regular rate and rhythm. Normal S1, S2; no murmur, gallop, or rub.  ABDOMEN: Soft, nontender. Bowel sounds auscultated all 4 quadrants. No palpable organomegaly or masses.  MUSCULOSKELETAL: Extremities without gross deformities noted. No edema of bilateral lower extremities.  NEURO: Alert and oriented x 3.  Gait steady.  PSYCHIATRIC: Mentation and affect appear normal.  Mood appropriate.    Laboratory:  Results for orders placed or performed in visit on 04/20/21   CBC with platelets differential     Status: None   Result Value Ref Range    WBC 5.4 4.0 - 11.0 10e9/L    RBC Count 4.07 3.8 - 5.2 10e12/L    Hemoglobin 12.7 11.7 - 15.7 g/dL    Hematocrit 37.6 35.0 - 47.0 %    MCV 92 78 - 100 fl    MCH 31.2 26.5 - 33.0 pg    MCHC 33.8 31.5 - 36.5 g/dL    RDW 13.9 10.0 - 15.0 %    Platelet Count 231 150 - 450 10e9/L    Diff Method Automated Method     % Neutrophils 57.5 %    % Lymphocytes 25.6 %    % Monocytes 12.0 %    % Eosinophils 3.9 %    % Basophils 0.6 %    % Immature Granulocytes 0.4 %    Nucleated RBCs 0 0 /100    Absolute Neutrophil 3.1 1.6 - 8.3 10e9/L    Absolute Lymphocytes 1.4 0.8 - 5.3 10e9/L    Absolute Monocytes 0.7 0.0 - 1.3 10e9/L    Absolute Eosinophils 0.2 0.0 - 0.7 10e9/L    Absolute Basophils 0.0 0.0 - 0.2 10e9/L    Abs Immature Granulocytes 0.0 0 - 0.4 10e9/L    Absolute Nucleated RBC 0.0    Comprehensive metabolic panel     Status: Abnormal   Result Value Ref Range    Sodium 134 133 - 144 mmol/L    Potassium 3.9 3.4 - 5.3 mmol/L    Chloride 104 94 - 109 mmol/L    Carbon Dioxide 25 20 - 32 mmol/L    Anion Gap 5 3 - 14 mmol/L    Glucose 100 (H) 70 - 99 mg/dL    Urea Nitrogen 19 7 - 30 mg/dL    Creatinine 0.68 0.52 - 1.04 mg/dL    GFR Estimate 87 >60 mL/min/[1.73_m2]    GFR Estimate If Black >90 >60 mL/min/[1.73_m2]    Calcium 8.7 8.5 - 10.1 mg/dL    Bilirubin Total 0.4 0.2 - 1.3  mg/dL    Albumin 3.5 3.4 - 5.0 g/dL    Protein Total 7.2 6.8 - 8.8 g/dL    Alkaline Phosphatase 104 40 - 150 U/L    ALT 22 0 - 50 U/L    AST 14 0 - 45 U/L     Component      Latest Ref Rng & Units 4/13/2021   Albumin Fraction      3.7 - 5.1 g/dL 4.2   Alpha 1 Fraction      0.2 - 0.4 g/dL 0.3   Alpha 2 Fraction      0.5 - 0.9 g/dL 0.8   Beta Fraction      0.6 - 1.0 g/dL 0.7   Gamma Fraction      0.7 - 1.6 g/dL 1.3   Monoclonal Peak      0.0 g/dL 1.1 (H)   ELP Interpretation:       Monoclonal protein (1.1 g/dL) seen in the gamma fraction.  See immunofixation report on . . .   Immunofixation ELP       (Note)   IGG      610 - 1,616 mg/dL 1,499   IGA      84 - 499 mg/dL 10 (L)   IGM      35 - 242 mg/dL 23 (L)   [place] Free Lt Chain      0.33 - 1.94 mg/dL 0.48   Lambda Free Lt Chain      0.57 - 2.63 mg/dL 0.21 (L)   Kappa Lambda Ratio      0.26 - 1.65 2.29 (H)     Imaging Studies:  None completed for today's visit       ASSESSMENT/PLAN:    #1 Multiple myeloma:   IgG kappa multiple myeloma with 80% involvement of the bone marrow diagnosed June 2019 initially treated with Velcade and Decadron and received 4 cycles with increasing M-spike and kappa/lambda ratio.  Treatment changed to CyBorD on 10/1/2019.  M-spike plateau at 1.2 and treatment changed to monotherapy with Darzalex Faspro injection.     M spike declined to 1.0 then increased again to 1.2 so Velcade and Dex added to her treatment plan with cycle 5 therapy.  Will initiate cycle 6 therapy today and patient will follow up prior to cycle 7 with CBC, CMP, SPEP, protein immunofixation and kappa lambda ratio.    #2  Bone lesions: Continue Zometa every 3 months.  We did discuss the importance of continue with every 6-month dental exams and being sure that her dentist is aware she is on this therapy.    I encouraged patient to call with any questions or concerns.    Barbie Quintana, NP  APRN, FNP-BC, AOCNP

## 2021-04-19 ENCOUNTER — OFFICE VISIT (OUTPATIENT)
Dept: FAMILY MEDICINE | Facility: OTHER | Age: 71
End: 2021-04-19
Attending: PHYSICIAN ASSISTANT
Payer: COMMERCIAL

## 2021-04-19 VITALS
DIASTOLIC BLOOD PRESSURE: 84 MMHG | HEIGHT: 63 IN | WEIGHT: 168 LBS | BODY MASS INDEX: 29.77 KG/M2 | TEMPERATURE: 98.7 F | HEART RATE: 87 BPM | SYSTOLIC BLOOD PRESSURE: 120 MMHG | RESPIRATION RATE: 16 BRPM | OXYGEN SATURATION: 96 %

## 2021-04-19 DIAGNOSIS — E78.2 MIXED HYPERLIPIDEMIA: ICD-10-CM

## 2021-04-19 DIAGNOSIS — E11.9 TYPE 2 DIABETES MELLITUS WITHOUT COMPLICATION, WITHOUT LONG-TERM CURRENT USE OF INSULIN (H): Primary | ICD-10-CM

## 2021-04-19 DIAGNOSIS — I10 ESSENTIAL HYPERTENSION WITH GOAL BLOOD PRESSURE LESS THAN 140/90: ICD-10-CM

## 2021-04-19 DIAGNOSIS — I10 HYPERTENSION GOAL BP (BLOOD PRESSURE) < 140/80: ICD-10-CM

## 2021-04-19 PROCEDURE — G0463 HOSPITAL OUTPT CLINIC VISIT: HCPCS

## 2021-04-19 PROCEDURE — 99214 OFFICE O/P EST MOD 30 MIN: CPT | Performed by: PHYSICIAN ASSISTANT

## 2021-04-19 RX ORDER — ATORVASTATIN CALCIUM 10 MG/1
10 TABLET, FILM COATED ORAL DAILY
Qty: 90 TABLET | Refills: 3 | Status: SHIPPED | OUTPATIENT
Start: 2021-04-19 | End: 2022-02-17

## 2021-04-19 RX ORDER — LOSARTAN POTASSIUM 50 MG/1
50 TABLET ORAL DAILY
Qty: 90 TABLET | Refills: 3 | Status: SHIPPED | OUTPATIENT
Start: 2021-04-19 | End: 2022-02-17

## 2021-04-19 ASSESSMENT — PAIN SCALES - GENERAL: PAINLEVEL: NO PAIN (0)

## 2021-04-19 ASSESSMENT — MIFFLIN-ST. JEOR: SCORE: 1241.17

## 2021-04-19 ASSESSMENT — PATIENT HEALTH QUESTIONNAIRE - PHQ9: SUM OF ALL RESPONSES TO PHQ QUESTIONS 1-9: 3

## 2021-04-19 NOTE — NURSING NOTE
"Chief Complaint   Patient presents with     Hypertension     Lipids     Depression     Anxiety     RECHECK       Initial /84   Pulse 87   Temp 98.7  F (37.1  C)   Resp 16   Ht 1.6 m (5' 3\")   Wt 76.2 kg (168 lb)   SpO2 96%   BMI 29.76 kg/m   Estimated body mass index is 29.76 kg/m  as calculated from the following:    Height as of this encounter: 1.6 m (5' 3\").    Weight as of this encounter: 76.2 kg (168 lb).  Medication Reconciliation: ricki Garcia  "

## 2021-04-20 ENCOUNTER — ONCOLOGY VISIT (OUTPATIENT)
Dept: ONCOLOGY | Facility: OTHER | Age: 71
End: 2021-04-20
Attending: NURSE PRACTITIONER
Payer: MEDICARE

## 2021-04-20 ENCOUNTER — INFUSION THERAPY VISIT (OUTPATIENT)
Dept: INFUSION THERAPY | Facility: OTHER | Age: 71
End: 2021-04-20
Attending: INTERNAL MEDICINE
Payer: MEDICARE

## 2021-04-20 VITALS
HEART RATE: 84 BPM | BODY MASS INDEX: 29.88 KG/M2 | DIASTOLIC BLOOD PRESSURE: 77 MMHG | OXYGEN SATURATION: 94 % | WEIGHT: 168.65 LBS | TEMPERATURE: 98.6 F | HEIGHT: 63 IN | SYSTOLIC BLOOD PRESSURE: 115 MMHG

## 2021-04-20 VITALS
RESPIRATION RATE: 18 BRPM | HEART RATE: 84 BPM | WEIGHT: 168.65 LBS | BODY MASS INDEX: 29.88 KG/M2 | DIASTOLIC BLOOD PRESSURE: 77 MMHG | HEIGHT: 63 IN | OXYGEN SATURATION: 94 % | SYSTOLIC BLOOD PRESSURE: 115 MMHG | TEMPERATURE: 98.6 F

## 2021-04-20 VITALS
BODY MASS INDEX: 29.88 KG/M2 | DIASTOLIC BLOOD PRESSURE: 77 MMHG | TEMPERATURE: 98.6 F | SYSTOLIC BLOOD PRESSURE: 115 MMHG | OXYGEN SATURATION: 95 % | RESPIRATION RATE: 16 BRPM | WEIGHT: 168.65 LBS | HEIGHT: 63 IN | HEART RATE: 81 BPM

## 2021-04-20 DIAGNOSIS — C90.00 MULTIPLE MYELOMA NOT HAVING ACHIEVED REMISSION (H): Primary | ICD-10-CM

## 2021-04-20 LAB
ALBUMIN SERPL-MCNC: 3.5 G/DL (ref 3.4–5)
ALP SERPL-CCNC: 104 U/L (ref 40–150)
ALT SERPL W P-5'-P-CCNC: 22 U/L (ref 0–50)
ANION GAP SERPL CALCULATED.3IONS-SCNC: 5 MMOL/L (ref 3–14)
AST SERPL W P-5'-P-CCNC: 14 U/L (ref 0–45)
BASOPHILS # BLD AUTO: 0 10E9/L (ref 0–0.2)
BASOPHILS NFR BLD AUTO: 0.6 %
BILIRUB SERPL-MCNC: 0.4 MG/DL (ref 0.2–1.3)
BUN SERPL-MCNC: 19 MG/DL (ref 7–30)
CALCIUM SERPL-MCNC: 8.7 MG/DL (ref 8.5–10.1)
CHLORIDE SERPL-SCNC: 104 MMOL/L (ref 94–109)
CO2 SERPL-SCNC: 25 MMOL/L (ref 20–32)
CREAT SERPL-MCNC: 0.68 MG/DL (ref 0.52–1.04)
DIFFERENTIAL METHOD BLD: NORMAL
EOSINOPHIL # BLD AUTO: 0.2 10E9/L (ref 0–0.7)
EOSINOPHIL NFR BLD AUTO: 3.9 %
ERYTHROCYTE [DISTWIDTH] IN BLOOD BY AUTOMATED COUNT: 13.9 % (ref 10–15)
GFR SERPL CREATININE-BSD FRML MDRD: 87 ML/MIN/{1.73_M2}
GLUCOSE SERPL-MCNC: 100 MG/DL (ref 70–99)
HCT VFR BLD AUTO: 37.6 % (ref 35–47)
HGB BLD-MCNC: 12.7 G/DL (ref 11.7–15.7)
IMM GRANULOCYTES # BLD: 0 10E9/L (ref 0–0.4)
IMM GRANULOCYTES NFR BLD: 0.4 %
LYMPHOCYTES # BLD AUTO: 1.4 10E9/L (ref 0.8–5.3)
LYMPHOCYTES NFR BLD AUTO: 25.6 %
MCH RBC QN AUTO: 31.2 PG (ref 26.5–33)
MCHC RBC AUTO-ENTMCNC: 33.8 G/DL (ref 31.5–36.5)
MCV RBC AUTO: 92 FL (ref 78–100)
MONOCYTES # BLD AUTO: 0.7 10E9/L (ref 0–1.3)
MONOCYTES NFR BLD AUTO: 12 %
NEUTROPHILS # BLD AUTO: 3.1 10E9/L (ref 1.6–8.3)
NEUTROPHILS NFR BLD AUTO: 57.5 %
NRBC # BLD AUTO: 0 10*3/UL
NRBC BLD AUTO-RTO: 0 /100
PLATELET # BLD AUTO: 231 10E9/L (ref 150–450)
POTASSIUM SERPL-SCNC: 3.9 MMOL/L (ref 3.4–5.3)
PROT SERPL-MCNC: 7.2 G/DL (ref 6.8–8.8)
RBC # BLD AUTO: 4.07 10E12/L (ref 3.8–5.2)
SODIUM SERPL-SCNC: 134 MMOL/L (ref 133–144)
WBC # BLD AUTO: 5.4 10E9/L (ref 4–11)

## 2021-04-20 PROCEDURE — 80053 COMPREHEN METABOLIC PANEL: CPT | Mod: ZL | Performed by: NURSE PRACTITIONER

## 2021-04-20 PROCEDURE — 96372 THER/PROPH/DIAG INJ SC/IM: CPT

## 2021-04-20 PROCEDURE — 250N000012 HC RX MED GY IP 250 OP 636 PS 637: Performed by: NURSE PRACTITIONER

## 2021-04-20 PROCEDURE — 85025 COMPLETE CBC W/AUTO DIFF WBC: CPT | Mod: ZL | Performed by: NURSE PRACTITIONER

## 2021-04-20 PROCEDURE — 96401 CHEMO ANTI-NEOPL SQ/IM: CPT

## 2021-04-20 PROCEDURE — G0463 HOSPITAL OUTPT CLINIC VISIT: HCPCS | Mod: 25

## 2021-04-20 PROCEDURE — 99215 OFFICE O/P EST HI 40 MIN: CPT | Performed by: NURSE PRACTITIONER

## 2021-04-20 PROCEDURE — 250N000013 HC RX MED GY IP 250 OP 250 PS 637: Mod: GY | Performed by: NURSE PRACTITIONER

## 2021-04-20 PROCEDURE — 36415 COLL VENOUS BLD VENIPUNCTURE: CPT | Mod: ZL | Performed by: NURSE PRACTITIONER

## 2021-04-20 PROCEDURE — 250N000011 HC RX IP 250 OP 636: Performed by: NURSE PRACTITIONER

## 2021-04-20 RX ORDER — DEXAMETHASONE 4 MG/1
20 TABLET ORAL ONCE
Status: COMPLETED | OUTPATIENT
Start: 2021-04-20 | End: 2021-04-20

## 2021-04-20 RX ORDER — ACETAMINOPHEN 325 MG/1
650 TABLET ORAL ONCE
Status: COMPLETED | OUTPATIENT
Start: 2021-04-20 | End: 2021-04-20

## 2021-04-20 RX ORDER — DIPHENHYDRAMINE HCL 50 MG
50 CAPSULE ORAL ONCE
Status: COMPLETED | OUTPATIENT
Start: 2021-04-20 | End: 2021-04-20

## 2021-04-20 RX ADMIN — DIPHENHYDRAMINE HYDROCHLORIDE 50 MG: 50 CAPSULE ORAL at 11:54

## 2021-04-20 RX ADMIN — BORTEZOMIB 2.4 MG: 3.5 INJECTION, POWDER, LYOPHILIZED, FOR SOLUTION INTRAVENOUS; SUBCUTANEOUS at 12:35

## 2021-04-20 RX ADMIN — DARATUMUMAB AND HYALURONIDASE-FIHJ (HUMAN RECOMBINANT) 1800 MG: 1800; 30000 INJECTION SUBCUTANEOUS at 12:26

## 2021-04-20 RX ADMIN — ACETAMINOPHEN 650 MG: 325 TABLET, FILM COATED ORAL at 11:54

## 2021-04-20 RX ADMIN — DEXAMETHASONE 20 MG: 4 TABLET ORAL at 11:54

## 2021-04-20 ASSESSMENT — MIFFLIN-ST. JEOR
SCORE: 1244
SCORE: 1244
SCORE: 1244.13

## 2021-04-20 ASSESSMENT — PAIN SCALES - GENERAL
PAINLEVEL: NO PAIN (0)
PAINLEVEL: NO PAIN (0)

## 2021-04-20 NOTE — PATIENT INSTRUCTIONS
We will see you back as planned. If you have any questions or concerns, we can be reached Monday through Friday 8am - 430pm at [phone] ([facility] [place]). If you have concerns related to a potential reaction/side effect after hours/weekends/holiday's, please seek emergent medical care.

## 2021-04-20 NOTE — PATIENT INSTRUCTIONS
We would like to see you back per your schedule for this cycle.     When you are in need of a refill, please call your pharmacy and they will send us a request.     If you have any questions please call [phone]    Other instructions:  none

## 2021-04-20 NOTE — PROGRESS NOTES
Patient is 70 years old, here accompanied by self today for injections of FASPRO and Velcade under the orders of Dr. Walker.     Today's lab values: WBC 5.4, ANC 3.1, , HGB 12.7, AST 14, ALT 22, Alkaline Phosphatase 104, Creatinine 0.68.     Patient meets parameters for today's infusion.  Denies questions or concerns regarding today's infusion and/or medications being administered.      Independent dose check completed with UZMA Rodríguez.    Patient identified with two identifiers, order verified, and verbal consent for today's infusion obtained from patient.     1226 Injection verified with FASPRO dose, drug, and rate of administration. Injection administered per protocol to left abdomen, 3in from umbilicus, at 45 degree angle per protocol of rotating site, and 3in from umbilicus. Patient tolerated injection well, no signs or symptoms of adverse reaction noted. Patient denies pain nor discomfort.     1235 Injection verified with Velcade dose, drug. Injection administered per protocol to right upper outer arm at 45 degree angle per protocol of rotating sites. Patient tolerated injection well, no signs or symptoms of adverse reaction noted. Patient denies pain nor discomfort.     Sites clean, dry and intact. Covered with a sterile bandage. Pt instructed to leave bandage intact for a minimum of one hour, and to call with questions or concerns. Patient states understanding, discharged.

## 2021-04-20 NOTE — NURSING NOTE
"Chief Complaint   Patient presents with     RECHECK     follow up multiple myeloma       Initial /77   Pulse 81   Temp 98.6  F (37  C) (Tympanic)   Resp 16   Ht 1.6 m (5' 3\")   Wt 76.5 kg (168 lb 10.4 oz)   SpO2 95%   BMI 29.88 kg/m   Estimated body mass index is 29.88 kg/m  as calculated from the following:    Height as of this encounter: 1.6 m (5' 3\").    Weight as of this encounter: 76.5 kg (168 lb 10.4 oz).  Medication Reconciliation: complete  Julia Anderson RN    "

## 2021-04-23 ENCOUNTER — INFUSION THERAPY VISIT (OUTPATIENT)
Dept: INFUSION THERAPY | Facility: OTHER | Age: 71
End: 2021-04-23
Attending: INTERNAL MEDICINE
Payer: MEDICARE

## 2021-04-23 VITALS
HEART RATE: 78 BPM | OXYGEN SATURATION: 96 % | HEIGHT: 63 IN | DIASTOLIC BLOOD PRESSURE: 72 MMHG | WEIGHT: 167.11 LBS | SYSTOLIC BLOOD PRESSURE: 118 MMHG | RESPIRATION RATE: 16 BRPM | BODY MASS INDEX: 29.61 KG/M2 | TEMPERATURE: 98.6 F

## 2021-04-23 DIAGNOSIS — C90.00 MULTIPLE MYELOMA NOT HAVING ACHIEVED REMISSION (H): Primary | ICD-10-CM

## 2021-04-23 PROCEDURE — 250N000011 HC RX IP 250 OP 636: Mod: JW | Performed by: NURSE PRACTITIONER

## 2021-04-23 PROCEDURE — 96401 CHEMO ANTI-NEOPL SQ/IM: CPT

## 2021-04-23 RX ADMIN — BORTEZOMIB 2.4 MG: 3.5 INJECTION, POWDER, LYOPHILIZED, FOR SOLUTION INTRAVENOUS; SUBCUTANEOUS at 13:16

## 2021-04-23 ASSESSMENT — MIFFLIN-ST. JEOR: SCORE: 1237

## 2021-04-23 NOTE — PATIENT INSTRUCTIONS

## 2021-04-23 NOTE — PROGRESS NOTES
Patient is 70 year old here today for injection of Velcade per order of LES Quintana NP.    No labs ordered for today's injection.     Patient denies questions nor concerns regarding medication, administration site, side effects, nor aftercare.  Patient identified with two identifiers, order verified, and verbal consent for today's injection obtained from patient.     Patient education provided on s/s of injection site infection, and/or medication specific side effects, and when to call a provider.  Patient instructed to report any adverse effects.     1316 Velcade administered per protocol in right upper outer arm at 45 degree angle.  Site covered with sterile bandage.  Patient tolerated injection well, no verbal nor non-verbal signs of discomfort noted.  No adverse effects noted at this time.     Patient instructed to call with further questions or concerns.  Patient states understanding and is in agreement with this plan.  Copy of appointments, and after visit summary (AVS) given to patient. Patient discharged.

## 2021-04-27 ENCOUNTER — INFUSION THERAPY VISIT (OUTPATIENT)
Dept: INFUSION THERAPY | Facility: OTHER | Age: 71
End: 2021-04-27
Attending: INTERNAL MEDICINE
Payer: MEDICARE

## 2021-04-27 VITALS
DIASTOLIC BLOOD PRESSURE: 79 MMHG | SYSTOLIC BLOOD PRESSURE: 110 MMHG | WEIGHT: 166.01 LBS | RESPIRATION RATE: 15 BRPM | BODY MASS INDEX: 29.41 KG/M2 | TEMPERATURE: 97.7 F | OXYGEN SATURATION: 97 % | HEIGHT: 63 IN | HEART RATE: 76 BPM

## 2021-04-27 DIAGNOSIS — C90.00 MULTIPLE MYELOMA NOT HAVING ACHIEVED REMISSION (H): Primary | ICD-10-CM

## 2021-04-27 DIAGNOSIS — E78.2 MIXED HYPERLIPIDEMIA: ICD-10-CM

## 2021-04-27 DIAGNOSIS — E11.9 TYPE 2 DIABETES MELLITUS WITHOUT COMPLICATION, WITHOUT LONG-TERM CURRENT USE OF INSULIN (H): ICD-10-CM

## 2021-04-27 LAB
BASOPHILS # BLD AUTO: 0 10E9/L (ref 0–0.2)
BASOPHILS NFR BLD AUTO: 0.2 %
CHOLEST SERPL-MCNC: 220 MG/DL
CREAT UR-MCNC: 29 MG/DL
DIFFERENTIAL METHOD BLD: NORMAL
EOSINOPHIL # BLD AUTO: 0.2 10E9/L (ref 0–0.7)
EOSINOPHIL NFR BLD AUTO: 4.9 %
ERYTHROCYTE [DISTWIDTH] IN BLOOD BY AUTOMATED COUNT: 13.9 % (ref 10–15)
EST. AVERAGE GLUCOSE BLD GHB EST-MCNC: 137 MG/DL
HBA1C MFR BLD: 6.4 % (ref 0–5.6)
HCT VFR BLD AUTO: 39.3 % (ref 35–47)
HDLC SERPL-MCNC: 49 MG/DL
HGB BLD-MCNC: 13.5 G/DL (ref 11.7–15.7)
IMM GRANULOCYTES # BLD: 0.1 10E9/L (ref 0–0.4)
IMM GRANULOCYTES NFR BLD: 1.1 %
LDLC SERPL CALC-MCNC: 137 MG/DL
LYMPHOCYTES # BLD AUTO: 1.4 10E9/L (ref 0.8–5.3)
LYMPHOCYTES NFR BLD AUTO: 31.1 %
MCH RBC QN AUTO: 32.1 PG (ref 26.5–33)
MCHC RBC AUTO-ENTMCNC: 34.4 G/DL (ref 31.5–36.5)
MCV RBC AUTO: 93 FL (ref 78–100)
MICROALBUMIN UR-MCNC: <5 MG/L
MICROALBUMIN/CREAT UR: NORMAL MG/G CR (ref 0–25)
MONOCYTES # BLD AUTO: 0.7 10E9/L (ref 0–1.3)
MONOCYTES NFR BLD AUTO: 16.6 %
NEUTROPHILS # BLD AUTO: 2.1 10E9/L (ref 1.6–8.3)
NEUTROPHILS NFR BLD AUTO: 46.1 %
NONHDLC SERPL-MCNC: 171 MG/DL
NRBC # BLD AUTO: 0 10*3/UL
NRBC BLD AUTO-RTO: 0 /100
PLATELET # BLD AUTO: 198 10E9/L (ref 150–450)
RBC # BLD AUTO: 4.21 10E12/L (ref 3.8–5.2)
TRIGL SERPL-MCNC: 171 MG/DL
WBC # BLD AUTO: 4.5 10E9/L (ref 4–11)

## 2021-04-27 PROCEDURE — 250N000011 HC RX IP 250 OP 636: Performed by: NURSE PRACTITIONER

## 2021-04-27 PROCEDURE — 80061 LIPID PANEL: CPT | Mod: ZL | Performed by: PHYSICIAN ASSISTANT

## 2021-04-27 PROCEDURE — 36415 COLL VENOUS BLD VENIPUNCTURE: CPT | Mod: ZL | Performed by: PHYSICIAN ASSISTANT

## 2021-04-27 PROCEDURE — 96401 CHEMO ANTI-NEOPL SQ/IM: CPT

## 2021-04-27 PROCEDURE — 85025 COMPLETE CBC W/AUTO DIFF WBC: CPT | Mod: ZL | Performed by: NURSE PRACTITIONER

## 2021-04-27 PROCEDURE — 999N001182 HC STATISTIC ESTIMATED AVERAGE GLUCOSE: Mod: ZL | Performed by: PHYSICIAN ASSISTANT

## 2021-04-27 PROCEDURE — 82043 UR ALBUMIN QUANTITATIVE: CPT | Mod: ZL | Performed by: PHYSICIAN ASSISTANT

## 2021-04-27 PROCEDURE — 83036 HEMOGLOBIN GLYCOSYLATED A1C: CPT | Mod: ZL | Performed by: PHYSICIAN ASSISTANT

## 2021-04-27 RX ADMIN — BORTEZOMIB 2.4 MG: 3.5 INJECTION, POWDER, LYOPHILIZED, FOR SOLUTION INTRAVENOUS; SUBCUTANEOUS at 13:27

## 2021-04-27 ASSESSMENT — PAIN SCALES - GENERAL: PAINLEVEL: NO PAIN (0)

## 2021-04-27 ASSESSMENT — MIFFLIN-ST. JEOR: SCORE: 1232.13

## 2021-04-27 NOTE — PATIENT INSTRUCTIONS
We will see you back as planned. If you have any questions or concerns, we can be reached Monday through Friday 8am - 430pm at [phone] ([facility] [place]). If you have concerns related to a potential reaction/side effect after hours/weekends/holiday's, please seek emergent medical care.

## 2021-04-27 NOTE — PROGRESS NOTES
Patient is a 70 year old female here today for injection of Velcade per order of . Patient meets parameters for today's infusion. Patient identified with two identifiers, order verified, and verbal consent for today's infusion obtained from patient. Written consent for treatment is on file and valid.    Labs reviewed and patient meets order parameters for today's treatment.  Patient denies questions or concerns regarding injection and/or medication(s) being administered.  Velcade injected SQ into left upper outer arm  per protocol rotating sites.     Injection administered per protocol. Patient tolerated infusion without incident, no signs or symptoms of adverse reaction noted. Patient denies pain or discomfort.     Covered with a sterile bandage. Pt instructed to leave bandage intact for a minimum of one hour, and to call with questions or concerns. Copy of appointments, discharge instructions, and after visit summary (AVS) provided to patient. Patient states understanding, discharged ambulatory.

## 2021-04-27 NOTE — PROGRESS NOTES
Peripheral labs ordered. Butterfly needle inserted into RT ARM.  Immediate blood return noted. THREE lab tubes drawn. Needle removed, covered with sterile guaze and coban. Patient tolerated well, denies pain or discomfort at this time. Patient discharged.

## 2021-04-27 NOTE — LETTER
April 28, 2021      Carmelita Watts  [address] [facility] [address]        Dear ,    We are writing to inform you of your test results.    Labs look good. Diabetes is very good.  Cholesterol is better but LDL is still up     Resulted Orders   Albumin Random Urine Quantitative with Creat Ratio   Result Value Ref Range    Creatinine Urine 29 mg/dL    Albumin Urine mg/L <5 mg/L    Albumin Urine mg/g Cr Unable to calculate due to low value 0 - 25 mg/g Cr   Hemoglobin A1c   Result Value Ref Range    Hemoglobin A1C 6.4 (H) 0 - 5.6 %      Comment:      Normal <5.7% Prediabetes 5.7-6.4%  Diabetes 6.5% or higher - adopted from ADA   consensus guidelines.     Lipid Profile   Result Value Ref Range    Cholesterol 220 (H) <200 mg/dL      Comment:      Desirable:       <200 mg/dl    Triglycerides 171 (H) <150 mg/dL      Comment:      Borderline high:  150-199 mg/dl  High:             200-499 mg/dl  Very high:       >499 mg/dl      HDL Cholesterol 49 (L) >49 mg/dL    LDL Cholesterol Calculated 137 (H) <100 mg/dL      Comment:      Above desirable:  100-129 mg/dl  Borderline High:  130-159 mg/dL  High:             160-189 mg/dL  Very high:       >189 mg/dl      Non HDL Cholesterol 171 (H) <130 mg/dL      Comment:      Above Desirable:  130-159 mg/dl  Borderline high:  160-189 mg/dl  High:             190-219 mg/dl  Very high:       >219 mg/dl     Estimated Average Glucose   Result Value Ref Range    Estimated Average Glucose 137 mg/dL       If you have any questions or concerns, please call the clinic at the number listed above.       Sincerely,      Kenyatta Trujillo PA

## 2021-04-29 ENCOUNTER — TELEPHONE (OUTPATIENT)
Dept: ONCOLOGY | Facility: OTHER | Age: 71
End: 2021-04-29

## 2021-04-29 NOTE — TELEPHONE ENCOUNTER
Clinic Care Coordination Contact  Care Team Conversations    VORB from Dr. Walker to cancel Day 8 and 11 of cycle 7.  Christine Otoole RN Oncology Care Coordinator

## 2021-04-30 ENCOUNTER — INFUSION THERAPY VISIT (OUTPATIENT)
Dept: INFUSION THERAPY | Facility: OTHER | Age: 71
End: 2021-04-30
Attending: INTERNAL MEDICINE
Payer: MEDICARE

## 2021-04-30 VITALS
DIASTOLIC BLOOD PRESSURE: 62 MMHG | BODY MASS INDEX: 30.04 KG/M2 | OXYGEN SATURATION: 96 % | WEIGHT: 169.53 LBS | HEIGHT: 63 IN | SYSTOLIC BLOOD PRESSURE: 112 MMHG | TEMPERATURE: 98.9 F | HEART RATE: 85 BPM | RESPIRATION RATE: 16 BRPM

## 2021-04-30 DIAGNOSIS — C90.00 MULTIPLE MYELOMA NOT HAVING ACHIEVED REMISSION (H): Primary | ICD-10-CM

## 2021-04-30 LAB
BASOPHILS # BLD AUTO: 0 10E9/L (ref 0–0.2)
BASOPHILS NFR BLD AUTO: 0.4 %
DIFFERENTIAL METHOD BLD: NORMAL
EOSINOPHIL # BLD AUTO: 0.2 10E9/L (ref 0–0.7)
EOSINOPHIL NFR BLD AUTO: 3.2 %
ERYTHROCYTE [DISTWIDTH] IN BLOOD BY AUTOMATED COUNT: 14 % (ref 10–15)
HCT VFR BLD AUTO: 39.8 % (ref 35–47)
HGB BLD-MCNC: 13.4 G/DL (ref 11.7–15.7)
IMM GRANULOCYTES # BLD: 0.2 10E9/L (ref 0–0.4)
IMM GRANULOCYTES NFR BLD: 3.8 %
LYMPHOCYTES # BLD AUTO: 1.7 10E9/L (ref 0.8–5.3)
LYMPHOCYTES NFR BLD AUTO: 34.1 %
MCH RBC QN AUTO: 31.6 PG (ref 26.5–33)
MCHC RBC AUTO-ENTMCNC: 33.7 G/DL (ref 31.5–36.5)
MCV RBC AUTO: 94 FL (ref 78–100)
MONOCYTES # BLD AUTO: 0.8 10E9/L (ref 0–1.3)
MONOCYTES NFR BLD AUTO: 16.3 %
NEUTROPHILS # BLD AUTO: 2.1 10E9/L (ref 1.6–8.3)
NEUTROPHILS NFR BLD AUTO: 42.2 %
NRBC # BLD AUTO: 0 10*3/UL
NRBC BLD AUTO-RTO: 0 /100
PLATELET # BLD AUTO: 161 10E9/L (ref 150–450)
RBC # BLD AUTO: 4.24 10E12/L (ref 3.8–5.2)
WBC # BLD AUTO: 5 10E9/L (ref 4–11)

## 2021-04-30 PROCEDURE — 36415 COLL VENOUS BLD VENIPUNCTURE: CPT | Mod: ZL | Performed by: NURSE PRACTITIONER

## 2021-04-30 PROCEDURE — 96401 CHEMO ANTI-NEOPL SQ/IM: CPT

## 2021-04-30 PROCEDURE — 85025 COMPLETE CBC W/AUTO DIFF WBC: CPT | Mod: ZL | Performed by: NURSE PRACTITIONER

## 2021-04-30 PROCEDURE — 250N000011 HC RX IP 250 OP 636: Performed by: NURSE PRACTITIONER

## 2021-04-30 RX ADMIN — BORTEZOMIB 2.4 MG: 3.5 INJECTION, POWDER, LYOPHILIZED, FOR SOLUTION INTRAVENOUS; SUBCUTANEOUS at 13:30

## 2021-04-30 ASSESSMENT — PAIN SCALES - GENERAL: PAINLEVEL: NO PAIN (0)

## 2021-04-30 ASSESSMENT — MIFFLIN-ST. JEOR: SCORE: 1248

## 2021-04-30 NOTE — PROGRESS NOTES
Patient is 70 years old, here today for injection of Velcade per order of . Patient meets parameters for today's infusion. Patient identified with two identifiers, order verified, and verbal consent for today's infusion obtained from patient. Written consent for treatment is on file and valid.    Today's lab values: WBC: 5.0, HGB: 13.4, PLT: 161  ANC: 2.1.     Patient meets order parameters for today's treatment.    Patient denies questions or concerns regarding injection and/or medication(s) being administered.    Independent dose check completed with UZMA Dumont.    Reminded of need to go to clinic lab for big labs next week, reminded no treatment here next week. She has calendar on her, so was reviewed and she verbalized understanding.     Velcade injected SQ into right upper outer arm at a 45 degree per protocol rotating sites. Patient tolerated injection without incident, no signs or symptoms of adverse reaction noted. Patient denies pain or discomfort.     Covered with a sterile bandage. Pt instructed to leave bandage intact for a minimum of one hour, and to call with questions or concerns. Patient states understanding, discharged.

## 2021-04-30 NOTE — PATIENT INSTRUCTIONS
We will see you back as planned. If you have any questions or concerns, we can be reached Monday through Friday 8am - 430pm at [phone] ([facility] [place] If you have concerns related to a potential reaction/side effect after hours/weekends/holiday's, please seek emergent medical care.

## 2021-05-04 DIAGNOSIS — C90.00 MULTIPLE MYELOMA NOT HAVING ACHIEVED REMISSION (H): ICD-10-CM

## 2021-05-04 PROCEDURE — 82784 ASSAY IGA/IGD/IGG/IGM EACH: CPT | Mod: ZL | Performed by: NURSE PRACTITIONER

## 2021-05-04 PROCEDURE — 36415 COLL VENOUS BLD VENIPUNCTURE: CPT | Mod: ZL | Performed by: NURSE PRACTITIONER

## 2021-05-04 PROCEDURE — 83883 ASSAY NEPHELOMETRY NOT SPEC: CPT | Mod: ZL | Performed by: NURSE PRACTITIONER

## 2021-05-04 PROCEDURE — 84165 PROTEIN E-PHORESIS SERUM: CPT | Mod: TC,ZL | Performed by: NURSE PRACTITIONER

## 2021-05-04 PROCEDURE — 999N001036 HC STATISTIC TOTAL PROTEIN: Mod: ZL | Performed by: NURSE PRACTITIONER

## 2021-05-04 PROCEDURE — 86334 IMMUNOFIX E-PHORESIS SERUM: CPT | Mod: TC,ZL | Performed by: NURSE PRACTITIONER

## 2021-05-06 LAB
ALBUMIN SERPL ELPH-MCNC: 4.2 G/DL (ref 3.7–5.1)
ALPHA1 GLOB SERPL ELPH-MCNC: 0.3 G/DL (ref 0.2–0.4)
ALPHA2 GLOB SERPL ELPH-MCNC: 0.7 G/DL (ref 0.5–0.9)
B-GLOBULIN SERPL ELPH-MCNC: 0.6 G/DL (ref 0.6–1)
GAMMA GLOB SERPL ELPH-MCNC: 1.2 G/DL (ref 0.7–1.6)
IGA SERPL-MCNC: 12 MG/DL (ref 84–499)
IGG SERPL-MCNC: 1317 MG/DL (ref 610–1616)
IGM SERPL-MCNC: 25 MG/DL (ref 35–242)
KAPPA LC UR-MCNC: 0.54 MG/DL (ref 0.33–1.94)
KAPPA LC/LAMBDA SER: 2.16 {RATIO} (ref 0.26–1.65)
LAMBDA LC SERPL-MCNC: 0.25 MG/DL (ref 0.57–2.63)
M PROTEIN SERPL ELPH-MCNC: 0.9 G/DL
PROT PATTERN SERPL ELPH-IMP: ABNORMAL
PROT PATTERN SERPL IFE-IMP: ABNORMAL

## 2021-05-10 RX ORDER — NALOXONE HYDROCHLORIDE 0.4 MG/ML
.1-.4 INJECTION, SOLUTION INTRAMUSCULAR; INTRAVENOUS; SUBCUTANEOUS
Status: CANCELLED | OUTPATIENT
Start: 2021-05-21

## 2021-05-10 RX ORDER — EPINEPHRINE 1 MG/ML
0.3 INJECTION, SOLUTION, CONCENTRATE INTRAVENOUS EVERY 5 MIN PRN
Status: CANCELLED | OUTPATIENT
Start: 2021-05-21

## 2021-05-10 RX ORDER — MEPERIDINE HYDROCHLORIDE 25 MG/ML
25 INJECTION INTRAMUSCULAR; INTRAVENOUS; SUBCUTANEOUS EVERY 30 MIN PRN
Status: CANCELLED | OUTPATIENT
Start: 2021-05-11

## 2021-05-10 RX ORDER — HEPARIN SODIUM (PORCINE) LOCK FLUSH IV SOLN 100 UNIT/ML 100 UNIT/ML
5 SOLUTION INTRAVENOUS
Status: CANCELLED | OUTPATIENT
Start: 2021-05-21

## 2021-05-10 RX ORDER — ALBUTEROL SULFATE 0.83 MG/ML
2.5 SOLUTION RESPIRATORY (INHALATION)
Status: CANCELLED | OUTPATIENT
Start: 2021-05-21

## 2021-05-10 RX ORDER — HEPARIN SODIUM,PORCINE 10 UNIT/ML
5 VIAL (ML) INTRAVENOUS
Status: CANCELLED | OUTPATIENT
Start: 2021-05-14

## 2021-05-10 RX ORDER — ALBUTEROL SULFATE 90 UG/1
1-2 AEROSOL, METERED RESPIRATORY (INHALATION)
Status: CANCELLED
Start: 2021-05-14

## 2021-05-10 RX ORDER — DIPHENHYDRAMINE HYDROCHLORIDE 50 MG/ML
50 INJECTION INTRAMUSCULAR; INTRAVENOUS
Status: CANCELLED
Start: 2021-05-14

## 2021-05-10 RX ORDER — DIPHENHYDRAMINE HYDROCHLORIDE 50 MG/ML
50 INJECTION INTRAMUSCULAR; INTRAVENOUS
Status: CANCELLED
Start: 2021-05-18

## 2021-05-10 RX ORDER — HEPARIN SODIUM (PORCINE) LOCK FLUSH IV SOLN 100 UNIT/ML 100 UNIT/ML
5 SOLUTION INTRAVENOUS
Status: CANCELLED | OUTPATIENT
Start: 2021-05-18

## 2021-05-10 RX ORDER — HEPARIN SODIUM,PORCINE 10 UNIT/ML
5 VIAL (ML) INTRAVENOUS
Status: CANCELLED | OUTPATIENT
Start: 2021-05-11

## 2021-05-10 RX ORDER — METHYLPREDNISOLONE SODIUM SUCCINATE 125 MG/2ML
125 INJECTION, POWDER, LYOPHILIZED, FOR SOLUTION INTRAMUSCULAR; INTRAVENOUS
Status: CANCELLED
Start: 2021-05-11

## 2021-05-10 RX ORDER — HEPARIN SODIUM,PORCINE 10 UNIT/ML
5 VIAL (ML) INTRAVENOUS
Status: CANCELLED | OUTPATIENT
Start: 2021-05-21

## 2021-05-10 RX ORDER — EPINEPHRINE 1 MG/ML
0.3 INJECTION, SOLUTION, CONCENTRATE INTRAVENOUS EVERY 5 MIN PRN
Status: CANCELLED | OUTPATIENT
Start: 2021-05-11

## 2021-05-10 RX ORDER — METHYLPREDNISOLONE SODIUM SUCCINATE 125 MG/2ML
125 INJECTION, POWDER, LYOPHILIZED, FOR SOLUTION INTRAMUSCULAR; INTRAVENOUS
Status: CANCELLED
Start: 2021-05-21

## 2021-05-10 RX ORDER — MEPERIDINE HYDROCHLORIDE 25 MG/ML
25 INJECTION INTRAMUSCULAR; INTRAVENOUS; SUBCUTANEOUS EVERY 30 MIN PRN
Status: CANCELLED | OUTPATIENT
Start: 2021-05-14

## 2021-05-10 RX ORDER — SODIUM CHLORIDE 9 MG/ML
1000 INJECTION, SOLUTION INTRAVENOUS CONTINUOUS PRN
Status: CANCELLED
Start: 2021-05-14

## 2021-05-10 RX ORDER — HEPARIN SODIUM (PORCINE) LOCK FLUSH IV SOLN 100 UNIT/ML 100 UNIT/ML
5 SOLUTION INTRAVENOUS
Status: CANCELLED | OUTPATIENT
Start: 2021-05-11

## 2021-05-10 RX ORDER — ALBUTEROL SULFATE 0.83 MG/ML
2.5 SOLUTION RESPIRATORY (INHALATION)
Status: CANCELLED | OUTPATIENT
Start: 2021-05-18

## 2021-05-10 RX ORDER — MEPERIDINE HYDROCHLORIDE 25 MG/ML
25 INJECTION INTRAMUSCULAR; INTRAVENOUS; SUBCUTANEOUS EVERY 30 MIN PRN
Status: CANCELLED | OUTPATIENT
Start: 2021-05-18

## 2021-05-10 RX ORDER — MEPERIDINE HYDROCHLORIDE 25 MG/ML
25 INJECTION INTRAMUSCULAR; INTRAVENOUS; SUBCUTANEOUS EVERY 30 MIN PRN
Status: CANCELLED | OUTPATIENT
Start: 2021-05-21

## 2021-05-10 RX ORDER — EPINEPHRINE 1 MG/ML
0.3 INJECTION, SOLUTION, CONCENTRATE INTRAVENOUS EVERY 5 MIN PRN
Status: CANCELLED | OUTPATIENT
Start: 2021-05-14

## 2021-05-10 RX ORDER — SODIUM CHLORIDE 9 MG/ML
1000 INJECTION, SOLUTION INTRAVENOUS CONTINUOUS PRN
Status: CANCELLED
Start: 2021-05-21

## 2021-05-10 RX ORDER — METHYLPREDNISOLONE SODIUM SUCCINATE 125 MG/2ML
125 INJECTION, POWDER, LYOPHILIZED, FOR SOLUTION INTRAMUSCULAR; INTRAVENOUS
Status: CANCELLED
Start: 2021-05-14

## 2021-05-10 RX ORDER — ALBUTEROL SULFATE 90 UG/1
1-2 AEROSOL, METERED RESPIRATORY (INHALATION)
Status: CANCELLED
Start: 2021-05-21

## 2021-05-10 RX ORDER — NALOXONE HYDROCHLORIDE 0.4 MG/ML
.1-.4 INJECTION, SOLUTION INTRAMUSCULAR; INTRAVENOUS; SUBCUTANEOUS
Status: CANCELLED | OUTPATIENT
Start: 2021-05-18

## 2021-05-10 RX ORDER — NALOXONE HYDROCHLORIDE 0.4 MG/ML
.1-.4 INJECTION, SOLUTION INTRAMUSCULAR; INTRAVENOUS; SUBCUTANEOUS
Status: CANCELLED | OUTPATIENT
Start: 2021-05-11

## 2021-05-10 RX ORDER — DIPHENHYDRAMINE HYDROCHLORIDE 50 MG/ML
50 INJECTION INTRAMUSCULAR; INTRAVENOUS
Status: CANCELLED
Start: 2021-05-21

## 2021-05-10 RX ORDER — EPINEPHRINE 1 MG/ML
0.3 INJECTION, SOLUTION, CONCENTRATE INTRAVENOUS EVERY 5 MIN PRN
Status: CANCELLED | OUTPATIENT
Start: 2021-05-18

## 2021-05-10 RX ORDER — HEPARIN SODIUM (PORCINE) LOCK FLUSH IV SOLN 100 UNIT/ML 100 UNIT/ML
5 SOLUTION INTRAVENOUS
Status: CANCELLED | OUTPATIENT
Start: 2021-05-14

## 2021-05-10 RX ORDER — HEPARIN SODIUM,PORCINE 10 UNIT/ML
5 VIAL (ML) INTRAVENOUS
Status: CANCELLED | OUTPATIENT
Start: 2021-05-18

## 2021-05-10 RX ORDER — NALOXONE HYDROCHLORIDE 0.4 MG/ML
.1-.4 INJECTION, SOLUTION INTRAMUSCULAR; INTRAVENOUS; SUBCUTANEOUS
Status: CANCELLED | OUTPATIENT
Start: 2021-05-14

## 2021-05-10 RX ORDER — ALBUTEROL SULFATE 0.83 MG/ML
2.5 SOLUTION RESPIRATORY (INHALATION)
Status: CANCELLED | OUTPATIENT
Start: 2021-05-14

## 2021-05-10 RX ORDER — DIPHENHYDRAMINE HYDROCHLORIDE 50 MG/ML
50 INJECTION INTRAMUSCULAR; INTRAVENOUS
Status: CANCELLED
Start: 2021-05-11

## 2021-05-10 RX ORDER — LORAZEPAM 2 MG/ML
0.5 INJECTION INTRAMUSCULAR EVERY 4 HOURS PRN
Status: CANCELLED | OUTPATIENT
Start: 2021-05-14

## 2021-05-10 RX ORDER — SODIUM CHLORIDE 9 MG/ML
1000 INJECTION, SOLUTION INTRAVENOUS CONTINUOUS PRN
Status: CANCELLED
Start: 2021-05-11

## 2021-05-10 RX ORDER — SODIUM CHLORIDE 9 MG/ML
1000 INJECTION, SOLUTION INTRAVENOUS CONTINUOUS PRN
Status: CANCELLED
Start: 2021-05-18

## 2021-05-10 RX ORDER — ALBUTEROL SULFATE 90 UG/1
1-2 AEROSOL, METERED RESPIRATORY (INHALATION)
Status: CANCELLED
Start: 2021-05-18

## 2021-05-10 RX ORDER — ALBUTEROL SULFATE 0.83 MG/ML
2.5 SOLUTION RESPIRATORY (INHALATION)
Status: CANCELLED | OUTPATIENT
Start: 2021-05-11

## 2021-05-10 RX ORDER — LORAZEPAM 2 MG/ML
0.5 INJECTION INTRAMUSCULAR EVERY 4 HOURS PRN
Status: CANCELLED | OUTPATIENT
Start: 2021-05-18

## 2021-05-10 RX ORDER — LORAZEPAM 2 MG/ML
0.5 INJECTION INTRAMUSCULAR EVERY 4 HOURS PRN
Status: CANCELLED | OUTPATIENT
Start: 2021-05-21

## 2021-05-10 RX ORDER — METHYLPREDNISOLONE SODIUM SUCCINATE 125 MG/2ML
125 INJECTION, POWDER, LYOPHILIZED, FOR SOLUTION INTRAMUSCULAR; INTRAVENOUS
Status: CANCELLED
Start: 2021-05-18

## 2021-05-10 RX ORDER — LORAZEPAM 2 MG/ML
0.5 INJECTION INTRAMUSCULAR EVERY 4 HOURS PRN
Status: CANCELLED | OUTPATIENT
Start: 2021-05-11

## 2021-05-10 RX ORDER — ALBUTEROL SULFATE 90 UG/1
1-2 AEROSOL, METERED RESPIRATORY (INHALATION)
Status: CANCELLED
Start: 2021-05-11

## 2021-05-11 ENCOUNTER — APPOINTMENT (OUTPATIENT)
Dept: LAB | Facility: OTHER | Age: 71
End: 2021-05-11
Attending: INTERNAL MEDICINE
Payer: MEDICARE

## 2021-05-11 ENCOUNTER — ONCOLOGY VISIT (OUTPATIENT)
Dept: ONCOLOGY | Facility: OTHER | Age: 71
End: 2021-05-11
Attending: NURSE PRACTITIONER
Payer: MEDICARE

## 2021-05-11 ENCOUNTER — INFUSION THERAPY VISIT (OUTPATIENT)
Dept: INFUSION THERAPY | Facility: OTHER | Age: 71
End: 2021-05-11
Attending: INTERNAL MEDICINE
Payer: MEDICARE

## 2021-05-11 VITALS
RESPIRATION RATE: 18 BRPM | OXYGEN SATURATION: 97 % | DIASTOLIC BLOOD PRESSURE: 68 MMHG | HEART RATE: 86 BPM | SYSTOLIC BLOOD PRESSURE: 113 MMHG

## 2021-05-11 VITALS
TEMPERATURE: 97.9 F | DIASTOLIC BLOOD PRESSURE: 70 MMHG | OXYGEN SATURATION: 97 % | BODY MASS INDEX: 30.08 KG/M2 | HEIGHT: 63 IN | WEIGHT: 169.75 LBS | SYSTOLIC BLOOD PRESSURE: 110 MMHG | RESPIRATION RATE: 20 BRPM | HEART RATE: 88 BPM

## 2021-05-11 DIAGNOSIS — M89.9 BONE LESION: ICD-10-CM

## 2021-05-11 DIAGNOSIS — C90.00 MULTIPLE MYELOMA NOT HAVING ACHIEVED REMISSION (H): Primary | ICD-10-CM

## 2021-05-11 LAB
ALBUMIN SERPL-MCNC: 3.4 G/DL (ref 3.4–5)
ALP SERPL-CCNC: 102 U/L (ref 40–150)
ALT SERPL W P-5'-P-CCNC: 24 U/L (ref 0–50)
ANION GAP SERPL CALCULATED.3IONS-SCNC: 6 MMOL/L (ref 3–14)
AST SERPL W P-5'-P-CCNC: 20 U/L (ref 0–45)
BASOPHILS # BLD AUTO: 0 10E9/L (ref 0–0.2)
BASOPHILS NFR BLD AUTO: 0.8 %
BILIRUB SERPL-MCNC: 0.3 MG/DL (ref 0.2–1.3)
BUN SERPL-MCNC: 19 MG/DL (ref 7–30)
CALCIUM SERPL-MCNC: 9.1 MG/DL (ref 8.5–10.1)
CHLORIDE SERPL-SCNC: 107 MMOL/L (ref 94–109)
CO2 SERPL-SCNC: 25 MMOL/L (ref 20–32)
CREAT SERPL-MCNC: 0.71 MG/DL (ref 0.52–1.04)
DIFFERENTIAL METHOD BLD: NORMAL
EOSINOPHIL # BLD AUTO: 0.2 10E9/L (ref 0–0.7)
EOSINOPHIL NFR BLD AUTO: 2.8 %
ERYTHROCYTE [DISTWIDTH] IN BLOOD BY AUTOMATED COUNT: 14.2 % (ref 10–15)
GFR SERPL CREATININE-BSD FRML MDRD: 85 ML/MIN/{1.73_M2}
GLUCOSE SERPL-MCNC: 100 MG/DL (ref 70–99)
HCT VFR BLD AUTO: 37.1 % (ref 35–47)
HGB BLD-MCNC: 12.6 G/DL (ref 11.7–15.7)
IMM GRANULOCYTES # BLD: 0.1 10E9/L (ref 0–0.4)
IMM GRANULOCYTES NFR BLD: 0.9 %
LYMPHOCYTES # BLD AUTO: 1 10E9/L (ref 0.8–5.3)
LYMPHOCYTES NFR BLD AUTO: 19.3 %
MCH RBC QN AUTO: 32.1 PG (ref 26.5–33)
MCHC RBC AUTO-ENTMCNC: 34 G/DL (ref 31.5–36.5)
MCV RBC AUTO: 94 FL (ref 78–100)
MONOCYTES # BLD AUTO: 0.6 10E9/L (ref 0–1.3)
MONOCYTES NFR BLD AUTO: 10.3 %
NEUTROPHILS # BLD AUTO: 3.5 10E9/L (ref 1.6–8.3)
NEUTROPHILS NFR BLD AUTO: 65.9 %
NRBC # BLD AUTO: 0 10*3/UL
NRBC BLD AUTO-RTO: 0 /100
PLATELET # BLD AUTO: 255 10E9/L (ref 150–450)
POTASSIUM SERPL-SCNC: 3.9 MMOL/L (ref 3.4–5.3)
PROT SERPL-MCNC: 7 G/DL (ref 6.8–8.8)
RBC # BLD AUTO: 3.93 10E12/L (ref 3.8–5.2)
SODIUM SERPL-SCNC: 138 MMOL/L (ref 133–144)
WBC # BLD AUTO: 5.3 10E9/L (ref 4–11)

## 2021-05-11 PROCEDURE — 80053 COMPREHEN METABOLIC PANEL: CPT | Mod: ZL | Performed by: NURSE PRACTITIONER

## 2021-05-11 PROCEDURE — 85025 COMPLETE CBC W/AUTO DIFF WBC: CPT | Mod: ZL | Performed by: NURSE PRACTITIONER

## 2021-05-11 PROCEDURE — G0463 HOSPITAL OUTPT CLINIC VISIT: HCPCS

## 2021-05-11 PROCEDURE — 250N000011 HC RX IP 250 OP 636: Mod: JW | Performed by: NURSE PRACTITIONER

## 2021-05-11 PROCEDURE — 99214 OFFICE O/P EST MOD 30 MIN: CPT | Performed by: NURSE PRACTITIONER

## 2021-05-11 PROCEDURE — 36415 COLL VENOUS BLD VENIPUNCTURE: CPT | Mod: ZL | Performed by: NURSE PRACTITIONER

## 2021-05-11 PROCEDURE — G0463 HOSPITAL OUTPT CLINIC VISIT: HCPCS | Mod: 25

## 2021-05-11 PROCEDURE — 250N000013 HC RX MED GY IP 250 OP 250 PS 637: Performed by: INTERNAL MEDICINE

## 2021-05-11 PROCEDURE — 250N000012 HC RX MED GY IP 250 OP 636 PS 637: Performed by: INTERNAL MEDICINE

## 2021-05-11 PROCEDURE — 96401 CHEMO ANTI-NEOPL SQ/IM: CPT

## 2021-05-11 RX ORDER — ACETAMINOPHEN 325 MG/1
650 TABLET ORAL ONCE
Status: COMPLETED | OUTPATIENT
Start: 2021-05-11 | End: 2021-05-11

## 2021-05-11 RX ORDER — DIPHENHYDRAMINE HCL 50 MG
50 CAPSULE ORAL ONCE
Status: COMPLETED | OUTPATIENT
Start: 2021-05-11 | End: 2021-05-11

## 2021-05-11 RX ORDER — DEXAMETHASONE 4 MG/1
20 TABLET ORAL ONCE
Status: COMPLETED | OUTPATIENT
Start: 2021-05-11 | End: 2021-05-11

## 2021-05-11 RX ADMIN — DARATUMUMAB AND HYALURONIDASE-FIHJ (HUMAN RECOMBINANT) 1800 MG: 1800; 30000 INJECTION SUBCUTANEOUS at 12:33

## 2021-05-11 RX ADMIN — BORTEZOMIB 2.4 MG: 3.5 INJECTION, POWDER, LYOPHILIZED, FOR SOLUTION INTRAVENOUS; SUBCUTANEOUS at 12:28

## 2021-05-11 RX ADMIN — DIPHENHYDRAMINE HYDROCHLORIDE 50 MG: 50 CAPSULE ORAL at 11:54

## 2021-05-11 RX ADMIN — ACETAMINOPHEN 650 MG: 325 TABLET, FILM COATED ORAL at 11:53

## 2021-05-11 RX ADMIN — DEXAMETHASONE 20 MG: 4 TABLET ORAL at 11:53

## 2021-05-11 ASSESSMENT — MIFFLIN-ST. JEOR: SCORE: 1249.13

## 2021-05-11 ASSESSMENT — PAIN SCALES - GENERAL: PAINLEVEL: NO PAIN (0)

## 2021-05-11 NOTE — PATIENT INSTRUCTIONS

## 2021-05-11 NOTE — PATIENT INSTRUCTIONS
We would like to see you back per your schedule.  We will cancel day 8 and day 11 of your treatment due to your trip.     When you are in need of a refill, please call your pharmacy and they will send us a request.     If you have any questions please call [phone]    Other instructions:  none

## 2021-05-11 NOTE — PROGRESS NOTES
Oncology Follow-up Visit:  May 11, 2021    Reason for Visit:  Patient presents with:  RECHECK: Follow up Multiple myeloma      Nursing Note and documentation reviewed: yes    HPI:  This is a 70-year-old female patient who presents to the oncology clinic today for evaluation prior to receiving cycle 7 day 1 therapy for Stage II-RISS IgG multiple myeloma diagnosed June 2019.  She progressed on Velcade and dexamethasone and CyBorD and treated was changed to Darzalex faspro injection. M-spike went from 1.0 to 1.2 in March so Velcade and dex were added to treatment plan.  She has received 1 cycle of this thus far.    She presents to the clinic today stating she is doing well.  She states she is getting a little bit of a rash to her face and has no other rashes.  She has some very mild fatigue but is remaining active.  She is looking forward to her trip next week.  She does express some concern in regards to her co-pays for her current treatment medications.    Oncologic History:     12/31/2018   patient presented to the emergency room with vertigo and fatigue.  CT scan of the head was negative and subsequent stress test was negative.  5/3/2019  She was seen by her PCP who ordered lab work and noted a total protein of 12.9.  SPEP at that time showed an M spike of 6.2 with a large monoclonal protein seen in the gamma fraction.  Urine immunofixation showed a possible small protein band in the gamma fraction  5/31/2019  she was evaluated by Dr. Walker with Medical Oncology with plan to rule out myeloma and obtain bone marrow aspiration biopsy as well as a metastatic bone survey along with additional labwork  6/18/2019 she underwent bone marrow aspiration and biopsy  6/24/2019  She was seen again by Dr. Walker and CBC showed a hemoglobin of 9.3, M spike 7.3 with monoclonal IgG immunoglobulin of kappa light chain type; serum viscosity was 2.9; quantitative immunoglobulins showed an IgG of 8160, beta-2 microglobulin was  5.8, BUN was 21 with creatinine is 0.8 and total protein was 13.7.  Quantitative kappa/lambda free light chains showed an elevated ratio of 17.0 bone marrow aspiration biopsy showed plasma cell myeloma with approximately 80% plasma cells.  Immunofixation showed IgG kappa and flow cytometry revealed kappa monotypic plasma cells consistent with clonal plasma cell neoplasm and FISH panel was pending at that time.  It was felt she had at least stage II disease based on her beta-2 microglobulin and anemia.  Plan was to treat with Velcade 1.3 mg per metered squared days 1, 4, 8 and 11/Decadron 40 mg on days 1, 8 and 15 initiation of Revlimid with the second cycle at 25 mg daily days 1 through 14.  Plan was to also obtain an MRI of the lumbar spine to rule out lytic lesion at L3.  She was initiated on Zovirax 400 mg p.o. twice daily.  6/25/2019  1st cycle of chemotherapy initiated  7/1/2019 note in chart regarding patient's large co-pay for the Revlimid and no plan at this point to initiate Revlimid and treat only with Velcade and Decadron per Dr. Walker  7/11/2019  MRI lumbar spine shows a pathologic superior endplate compression fracture at L3 without evidence of retropulsed fragment and innumerable enhancing lesions throughout the lumbar spine consistent with history of multiple myeloma.  9/10/2019  Increasing M-spike and kappa lambda ratio  10/1/2019  Initiation of CyBorD  12/1/2020  Initiation of Darzalex Faspro injection  3/23/2021  M-spike increased form 1.0 to 1.2 and velcade and dexamethasone added to plan     Current Chemo Regime/TX:  Darzalex faspro injection 1800mg subcutaneous per protocol with velcade 1.3mg/m2 and dexamethasone 20mg added to day 4, 8, 11 with cycle given every 21 days      **Zometa 4mg every 3 months  Current Cycle: 7 day 1 (cycle 5 added velcade dex)  # of completed cycles:  6     Previous treatment:   Velcade 1.3 mg/m2 days 1, 4, 8 and 11 with Decadron 40 mg days 1, 8 and 15 x 4  "cycles;   Velcade 1.5mg.m2/cyclophosphamide 150mg every 7 days on days 1,8,15 and 22/decadron 40mg days 1,8,15,22 ; Darzalex faspro injection 1800mg subcutaneous per protocol    Past Medical History:   Diagnosis Date     Arthritis      Depressive disorder      Diabetes mellitus, type 2 (H) 1/18/2021     Essential hypertension 10/1/2015     Major depressive disorder, recurrent episode, mild (H) 10/1/2015     Mixed hyperlipidemia 10/1/2015     Multiple myeloma not having achieved remission (H) 6/24/2019     Other specified disorders of bladder 07/09/2012    Irritable Bladder     Seasonal allergies 10/1/2015     Unspecified essential hypertension 03/19/2007     Unspecified sinusitis (chronic) 09/05/2007       Past Surgical History:   Procedure Laterality Date     APPENDECTOMY       BONE MARROW BIOPSY, BONE SPECIMEN, NEEDLE/TROCAR N/A 6/18/2019    Procedure: BONE MARROW BIOPSY;  Surgeon: Maciej Sanz MD;  Location: [facility]     CHOLECYSTECTOMY       COLONOSCOPY  07-    repeat 10 years     COLONOSCOPY N/A 12/30/2016    Procedure: COLONOSCOPY;  Surgeon: Bhaskar Franklin DO;  Location: [facility]     SINUS SURGERY       TUBAL STERILIZATION         Family History   Problem Relation Age of Onset     Breast Cancer Mother 66        Cause of Death     Parkinsonism Father         \"Possible\"     Coronary Artery Disease Father      Pacemaker Father      Thyroid Disease Daughter      Diabetes No family hx of      Hypertension No family hx of      Hyperlipidemia No family hx of      Cerebrovascular Disease No family hx of      Colon Cancer No family hx of      Prostate Cancer No family hx of      Genetic Disorder No family hx of      Asthma No family hx of      Anesthesia Reaction No family hx of        Social History     Socioeconomic History     Marital status:      Spouse name: Not on file     Number of children: Not on file     Years of education: Not on file     Highest education level: Not on file   Occupational " History     Occupation: Financial     Comment:  - (FT)   Social Needs     Financial resource strain: Not on file     Food insecurity     Worry: Not on file     Inability: Not on file     Transportation needs     Medical: Not on file     Non-medical: Not on file   Tobacco Use     Smoking status: Never Smoker     Smokeless tobacco: Never Used     Tobacco comment: Tried to Quit (YES); QUIT in 1971; Passive Exposure (NO)   Substance and Sexual Activity     Alcohol use: Yes     Comment: RARELY     Drug use: No     Sexual activity: Yes     Partners: Male     Birth control/protection: None   Lifestyle     Physical activity     Days per week: Not on file     Minutes per session: Not on file     Stress: Not on file   Relationships     Social connections     Talks on phone: Not on file     Gets together: Not on file     Attends [employer] service: Not on file     Active member of club or organization: Not on file     Attends meetings of clubs or organizations: Not on file     Relationship status: Not on file     Intimate partner violence     Fear of current or ex partner: Not on file     Emotionally abused: Not on file     Physically abused: Not on file     Forced sexual activity: Not on file   Other Topics Concern      Service Not Asked     Blood Transfusions Not Asked     Caffeine Concern Yes     Comment: Coffee >6 cups daily     Occupational Exposure Not Asked     Hobby Hazards Not Asked     Sleep Concern Not Asked     Stress Concern Not Asked     Weight Concern Not Asked     Special Diet Not Asked     Back Care Not Asked     Exercise Not Asked     Bike Helmet Not Asked     Seat Belt Not Asked     Self-Exams Not Asked     Parent/sibling w/ CABG, MI or angioplasty before 65F 55M? No   Social History Narrative     Not on file       Current Outpatient Medications   Medication     acyclovir (ZOVIRAX) 400 MG tablet     aspirin (ASA) 81 MG chewable tablet     atorvastatin (LIPITOR) 10 MG tablet      "dexamethasone (DECADRON) 4 MG tablet     fluticasone (FLONASE) 50 MCG/ACT nasal spray     losartan (COZAAR) 50 MG tablet     sertraline (ZOLOFT) 50 MG tablet     EPINEPHrine (EPIPEN) 0.3 MG/0.3ML injection     prochlorperazine (COMPAZINE) 10 MG tablet     No current facility-administered medications for this visit.         Allergies   Allergen Reactions     Lisinopril Cough     Phenylephrine Hcl Other (See Comments)     **Entex  HEADACHE (SEVERE)     Phenylpropanolamine Other (See Comments)     **Entex  HEADACHE (SEVERE)     Pseudoephedrine Tannate Other (See Comments)     **Entex  HEADACHE (SEVERE)     Levofloxacin Rash     **Levaquin     Moxifloxacin Hcl [Avelox] Rash       Review Of Systems:  Constitutional:    denies fever, weight changes, chills, and night sweats.  Eyes:    denies blurred or double vision; some blurred vision when reading  Ears/Nose/Throat:   denies ear pain, nose problems, difficulty swallowing  Respiratory:   denies shortness of breath, cough   Skin:   See hPI  Cardiovascular:   denies chest pain, palpitations, has some mild edema in her hands  Gastrointestinal:   denies abdominal pain, bloating, nausea, early satiety; no change in bowel habits or blood in stool  Genitourinary:   denies difficulty with urination, blood in urine  Musculoskeletal:    denies new muscle pain, bone pain  Neurologic:   denies lightheadedness, headaches, right hand has numbness and tingling at times  Psychiatric:   denies anxiety, depression-feels she is doing alright  Hematologic/Lymphatic/Immunologic:   denies easy bruising, easy bleeding, lumps or bumps noted  Endocrine:   Denies increased thirst    Fatigue (0=no fatigue; 10=worst fatigue imaginable): 2-3    ECOG Performance Status: 0    Physical Exam:  /70   Pulse 88   Temp 97.9  F (36.6  C) (Tympanic)   Resp 20   Ht 1.6 m (5' 3\")   Wt 77 kg (169 lb 12.1 oz)   SpO2 97%   BMI 30.07 kg/m      GENERAL APPEARANCE: Healthy, alert and in no acute " distress.  HEENT: Normocephalic, Sclerae anicteric.  No oral lesions or thrush.  NECK:   No asymmetry or masses, no thyromegaly.  LYMPHATICS: No palpable cervical, supraclavicular, axillary, or inguinal nodes   RESP: Lungs clear to auscultation bilaterally, respirations regular and easy  CARDIOVASCULAR: Regular rate and rhythm. Normal S1, S2; no murmur, gallop, or rub.  ABDOMEN: Soft, nontender. Bowel sounds auscultated all 4 quadrants. No palpable organomegaly or masses.  MUSCULOSKELETAL: Extremities without gross deformities noted. No edema of bilateral lower extremities.  SKIN: Very faint macular rash to her cheeks  NEURO: Alert and oriented x 3.  Gait steady.  PSYCHIATRIC: Mentation and affect appear normal.  Mood appropriate.    Laboratory:    Results for orders placed or performed in visit on 05/11/21   CBC with platelets differential     Status: None   Result Value Ref Range    WBC 5.3 4.0 - 11.0 10e9/L    RBC Count 3.93 3.8 - 5.2 10e12/L    Hemoglobin 12.6 11.7 - 15.7 g/dL    Hematocrit 37.1 35.0 - 47.0 %    MCV 94 78 - 100 fl    MCH 32.1 26.5 - 33.0 pg    MCHC 34.0 31.5 - 36.5 g/dL    RDW 14.2 10.0 - 15.0 %    Platelet Count 255 150 - 450 10e9/L    Diff Method Automated Method     % Neutrophils 65.9 %    % Lymphocytes 19.3 %    % Monocytes 10.3 %    % Eosinophils 2.8 %    % Basophils 0.8 %    % Immature Granulocytes 0.9 %    Nucleated RBCs 0 0 /100    Absolute Neutrophil 3.5 1.6 - 8.3 10e9/L    Absolute Lymphocytes 1.0 0.8 - 5.3 10e9/L    Absolute Monocytes 0.6 0.0 - 1.3 10e9/L    Absolute Eosinophils 0.2 0.0 - 0.7 10e9/L    Absolute Basophils 0.0 0.0 - 0.2 10e9/L    Abs Immature Granulocytes 0.1 0 - 0.4 10e9/L    Absolute Nucleated RBC 0.0    Comprehensive metabolic panel     Status: Abnormal   Result Value Ref Range    Sodium 138 133 - 144 mmol/L    Potassium 3.9 3.4 - 5.3 mmol/L    Chloride 107 94 - 109 mmol/L    Carbon Dioxide 25 20 - 32 mmol/L    Anion Gap 6 3 - 14 mmol/L    Glucose 100 (H) 70 - 99  mg/dL    Urea Nitrogen 19 7 - 30 mg/dL    Creatinine 0.71 0.52 - 1.04 mg/dL    GFR Estimate 85 >60 mL/min/[1.73_m2]    GFR Estimate If Black >90 >60 mL/min/[1.73_m2]    Calcium 9.1 8.5 - 10.1 mg/dL    Bilirubin Total 0.3 0.2 - 1.3 mg/dL    Albumin 3.4 3.4 - 5.0 g/dL    Protein Total 7.0 6.8 - 8.8 g/dL    Alkaline Phosphatase 102 40 - 150 U/L    ALT 24 0 - 50 U/L    AST 20 0 - 45 U/L       Component      Latest Ref Rng & Units 5/4/2021   Albumin Fraction      3.7 - 5.1 g/dL 4.2   Alpha 1 Fraction      0.2 - 0.4 g/dL 0.3   Alpha 2 Fraction      0.5 - 0.9 g/dL 0.7   Beta Fraction      0.6 - 1.0 g/dL 0.6   Gamma Fraction      0.7 - 1.6 g/dL 1.2   Monoclonal Peak      0.0 g/dL 0.9 (H)   ELP Interpretation:       Monoclonal protein (about 0.9 g/dL) seen in the gamma fraction.  See immunofixation report . . .   Immunofixation ELP       (Note)   IGG      610 - 1,616 mg/dL 1,317   IGA      84 - 499 mg/dL 12 (L)   IGM      35 - 242 mg/dL 25 (L)   [place] Free Lt Chain      0.33 - 1.94 mg/dL 0.54   Lambda Free Lt Chain      0.57 - 2.63 mg/dL 0.25 (L)   Kappa Lambda Ratio      0.26 - 1.65 2.16 (H)     Imaging Studies:  None completed for today's visit      ASSESSMENT/PLAN:    #1 Multiple myeloma:   IgG kappa multiple myeloma with 80% involvement of the bone marrow diagnosed June 2019 initially treated with Velcade and Decadron and received 4 cycles with increasing M-spike and kappa/lambda ratio.  Treatment changed to CyBorD on 10/1/2019.  M-spike plateau at 1.2 and treatment changed to monotherapy with Darzalex Faspro injection.     M spike declined to 1.0 then increased again to 1.2 so Velcade and Dex added to her treatment plan with cycle 5 therapy.  M-spike down to 0.9.  Will initiate cycle 7 therapy today and cancel day 8 and 11 per Dr. Walker related to patient's trip and patient will follow up prior to cycle 8 with CBC, CMP, SPEP, protein immunofixation and kappa lambda ratio.  Patient was aware that I will reach out to  the  and have her give her a call in regards to possible assistance.  Patient appreciative.     #2  Bone lesions: Continue Zometa every 3 months.  We did discuss the importance of continue with every 6-month dental exams and being sure that her dentist is aware she is on this therapy.    I encouraged patient to call with any questions or concerns.    Barbie Quintana, NP  APRN, FNP-BC, AOCNP

## 2021-05-11 NOTE — PROGRESS NOTES
Patient is 70 year old, here today for injections of Faspro and Velcade per order of Dr Walker.     Today's lab values are: WBC 5.3, ANC 3.5, HGB 12.6, AST 20, ALT 24, Alkaline Phosphatase 102, Creatinine 0.71    Labs meet parameters for today's injection.     Independent dose check completed with MARKO Lan RN prior to release of drug.    Patient denies questions nor concerns regarding medication, administration site, side effects, nor aftercare.  Patient identified with two identifiers, order verified, and verbal consent for today's injection obtained from patient.     Patient education provided on s/s of injection site infection, and/or medication specific side effects, and when to call a provider.  Patient instructed to report any adverse effects.     1228 Velcade administered per protocol in left upper outer arm at 45 degree angle per rotating sites.  Site covered with sterile bandage.  Patient tolerated injection well, no verbal nor non-verbal signs of discomfort noted.  No adverse effects noted at this time.     1233 Faspro administered per protocol in right abdomen 3 inches from umbilicus at 45 degree angle per rotating sites.  Site covered with sterile bandage.  Patient tolerated injection well, no verbal nor non-verbal signs of discomfort noted.  No adverse effects noted at this time.     Patient instructed to call with further questions or concerns.  Patient states understanding and is in agreement with this plan. Patient discharged.

## 2021-05-11 NOTE — NURSING NOTE
"Chief Complaint   Patient presents with     RECHECK     Follow up Multiple myeloma        Initial /70   Pulse 88   Temp 97.9  F (36.6  C) (Tympanic)   Resp 20   Ht 1.6 m (5' 3\")   Wt 77 kg (169 lb 12.1 oz)   SpO2 97%   BMI 30.07 kg/m   Estimated body mass index is 30.07 kg/m  as calculated from the following:    Height as of this encounter: 1.6 m (5' 3\").    Weight as of this encounter: 77 kg (169 lb 12.1 oz).  Medication Reconciliation: complete.  Immunizations and advance directives status reviewed. Pain scale =0 , PHQ-2=0.            Vilma Chi LPN    "

## 2021-05-14 ENCOUNTER — INFUSION THERAPY VISIT (OUTPATIENT)
Dept: INFUSION THERAPY | Facility: OTHER | Age: 71
End: 2021-05-14
Attending: INTERNAL MEDICINE
Payer: MEDICARE

## 2021-05-14 VITALS
OXYGEN SATURATION: 93 % | HEART RATE: 92 BPM | TEMPERATURE: 98.5 F | HEIGHT: 63 IN | WEIGHT: 169.09 LBS | BODY MASS INDEX: 29.96 KG/M2 | RESPIRATION RATE: 18 BRPM | DIASTOLIC BLOOD PRESSURE: 75 MMHG | SYSTOLIC BLOOD PRESSURE: 106 MMHG

## 2021-05-14 DIAGNOSIS — C90.00 MULTIPLE MYELOMA NOT HAVING ACHIEVED REMISSION (H): Primary | ICD-10-CM

## 2021-05-14 PROCEDURE — 96401 CHEMO ANTI-NEOPL SQ/IM: CPT

## 2021-05-14 PROCEDURE — 250N000011 HC RX IP 250 OP 636: Performed by: NURSE PRACTITIONER

## 2021-05-14 RX ADMIN — BORTEZOMIB 2.4 MG: 3.5 INJECTION, POWDER, LYOPHILIZED, FOR SOLUTION INTRAVENOUS; SUBCUTANEOUS at 13:07

## 2021-05-14 ASSESSMENT — MIFFLIN-ST. JEOR: SCORE: 1246

## 2021-05-14 ASSESSMENT — PAIN SCALES - GENERAL: PAINLEVEL: NO PAIN (0)

## 2021-05-14 NOTE — PROGRESS NOTES
Patient is 70 years old, here today for injection of Velcade per order of . Patient meets parameters for today's infusion. Patient identified with two identifiers, order verified, and verbal consent for today's infusion obtained from patient. Written consent for treatment is on file and valid.    Patient denies questions or concerns regarding injection and/or medication(s) being administered.    Independent dose check completed with UZMA Mart.    Velcade injected SQ into right upper outer arm 45 degree angle per protocol rotating sites. Patient has declined use of abd for injections. Is aware of risks related to use of only 2 sites, verbalizes adequate understanding. Patient tolerated injection without incident, no signs or symptoms of adverse reaction noted. Patient denies pain or discomfort.     Covered with a sterile bandage. Pt instructed to leave bandage intact for a minimum of one hour, and to call with questions or concerns. Copy of appointments, discharge instructions, and after visit summary (AVS) provided to patient. Patient states understanding, discharged.

## 2021-05-25 DIAGNOSIS — C90.00 MULTIPLE MYELOMA NOT HAVING ACHIEVED REMISSION (H): ICD-10-CM

## 2021-05-25 PROCEDURE — 999N001036 HC STATISTIC TOTAL PROTEIN: Mod: ZL | Performed by: NURSE PRACTITIONER

## 2021-05-25 PROCEDURE — 36415 COLL VENOUS BLD VENIPUNCTURE: CPT | Mod: ZL | Performed by: NURSE PRACTITIONER

## 2021-05-25 PROCEDURE — 82784 ASSAY IGA/IGD/IGG/IGM EACH: CPT | Mod: ZL | Performed by: NURSE PRACTITIONER

## 2021-05-25 PROCEDURE — 86334 IMMUNOFIX E-PHORESIS SERUM: CPT | Mod: TC,ZL | Performed by: NURSE PRACTITIONER

## 2021-05-25 PROCEDURE — 83883 ASSAY NEPHELOMETRY NOT SPEC: CPT | Mod: ZL | Performed by: NURSE PRACTITIONER

## 2021-05-25 PROCEDURE — 84165 PROTEIN E-PHORESIS SERUM: CPT | Mod: TC,ZL | Performed by: NURSE PRACTITIONER

## 2021-05-26 LAB
ALBUMIN SERPL ELPH-MCNC: 4.2 G/DL (ref 3.7–5.1)
ALPHA1 GLOB SERPL ELPH-MCNC: 0.4 G/DL (ref 0.2–0.4)
ALPHA2 GLOB SERPL ELPH-MCNC: 0.8 G/DL (ref 0.5–0.9)
B-GLOBULIN SERPL ELPH-MCNC: 0.6 G/DL (ref 0.6–1)
GAMMA GLOB SERPL ELPH-MCNC: 1.1 G/DL (ref 0.7–1.6)
IGA SERPL-MCNC: 8 MG/DL (ref 84–499)
IGG SERPL-MCNC: 1277 MG/DL (ref 610–1616)
IGM SERPL-MCNC: 22 MG/DL (ref 35–242)
KAPPA LC UR-MCNC: 0.42 MG/DL (ref 0.33–1.94)
KAPPA LC/LAMBDA SER: 1.83 {RATIO} (ref 0.26–1.65)
LAMBDA LC SERPL-MCNC: 0.23 MG/DL (ref 0.57–2.63)
M PROTEIN SERPL ELPH-MCNC: 0.8 G/DL
PROT PATTERN SERPL ELPH-IMP: ABNORMAL
PROT PATTERN SERPL IFE-IMP: ABNORMAL

## 2021-05-28 ENCOUNTER — ONCOLOGY VISIT (OUTPATIENT)
Dept: ONCOLOGY | Facility: OTHER | Age: 71
End: 2021-05-28
Attending: NURSE PRACTITIONER
Payer: MEDICARE

## 2021-05-28 VITALS
TEMPERATURE: 98.5 F | RESPIRATION RATE: 16 BRPM | BODY MASS INDEX: 30.31 KG/M2 | OXYGEN SATURATION: 95 % | HEART RATE: 79 BPM | HEIGHT: 63 IN | DIASTOLIC BLOOD PRESSURE: 72 MMHG | SYSTOLIC BLOOD PRESSURE: 124 MMHG | WEIGHT: 171.08 LBS

## 2021-05-28 DIAGNOSIS — C90.00 MULTIPLE MYELOMA NOT HAVING ACHIEVED REMISSION (H): Primary | ICD-10-CM

## 2021-05-28 PROCEDURE — 99214 OFFICE O/P EST MOD 30 MIN: CPT | Performed by: NURSE PRACTITIONER

## 2021-05-28 RX ORDER — ALBUTEROL SULFATE 0.83 MG/ML
2.5 SOLUTION RESPIRATORY (INHALATION)
Status: CANCELLED | OUTPATIENT
Start: 2021-06-11

## 2021-05-28 RX ORDER — ACETAMINOPHEN 325 MG/1
650 TABLET ORAL ONCE
Status: CANCELLED | OUTPATIENT
Start: 2021-06-01

## 2021-05-28 RX ORDER — ALBUTEROL SULFATE 0.83 MG/ML
2.5 SOLUTION RESPIRATORY (INHALATION)
Status: CANCELLED | OUTPATIENT
Start: 2021-06-04

## 2021-05-28 RX ORDER — EPINEPHRINE 1 MG/ML
0.3 INJECTION, SOLUTION, CONCENTRATE INTRAVENOUS EVERY 5 MIN PRN
Status: CANCELLED | OUTPATIENT
Start: 2021-06-11

## 2021-05-28 RX ORDER — SODIUM CHLORIDE 9 MG/ML
1000 INJECTION, SOLUTION INTRAVENOUS CONTINUOUS PRN
Status: CANCELLED
Start: 2021-06-01

## 2021-05-28 RX ORDER — METHYLPREDNISOLONE SODIUM SUCCINATE 125 MG/2ML
125 INJECTION, POWDER, LYOPHILIZED, FOR SOLUTION INTRAMUSCULAR; INTRAVENOUS
Status: CANCELLED
Start: 2021-06-04

## 2021-05-28 RX ORDER — SODIUM CHLORIDE 9 MG/ML
1000 INJECTION, SOLUTION INTRAVENOUS CONTINUOUS PRN
Status: CANCELLED
Start: 2021-06-11

## 2021-05-28 RX ORDER — DEXAMETHASONE 4 MG/1
20 TABLET ORAL ONCE
Status: CANCELLED | OUTPATIENT
Start: 2021-06-01

## 2021-05-28 RX ORDER — ALBUTEROL SULFATE 90 UG/1
1-2 AEROSOL, METERED RESPIRATORY (INHALATION)
Status: CANCELLED
Start: 2021-06-01

## 2021-05-28 RX ORDER — EPINEPHRINE 1 MG/ML
0.3 INJECTION, SOLUTION, CONCENTRATE INTRAVENOUS EVERY 5 MIN PRN
Status: CANCELLED | OUTPATIENT
Start: 2021-06-01

## 2021-05-28 RX ORDER — HEPARIN SODIUM,PORCINE 10 UNIT/ML
5 VIAL (ML) INTRAVENOUS
Status: CANCELLED | OUTPATIENT
Start: 2021-06-04

## 2021-05-28 RX ORDER — DIPHENHYDRAMINE HYDROCHLORIDE 50 MG/ML
50 INJECTION INTRAMUSCULAR; INTRAVENOUS
Status: CANCELLED
Start: 2021-06-08

## 2021-05-28 RX ORDER — NALOXONE HYDROCHLORIDE 0.4 MG/ML
.1-.4 INJECTION, SOLUTION INTRAMUSCULAR; INTRAVENOUS; SUBCUTANEOUS
Status: CANCELLED | OUTPATIENT
Start: 2021-06-01

## 2021-05-28 RX ORDER — DIPHENHYDRAMINE HYDROCHLORIDE 50 MG/ML
50 INJECTION INTRAMUSCULAR; INTRAVENOUS
Status: CANCELLED
Start: 2021-06-11

## 2021-05-28 RX ORDER — MEPERIDINE HYDROCHLORIDE 25 MG/ML
25 INJECTION INTRAMUSCULAR; INTRAVENOUS; SUBCUTANEOUS EVERY 30 MIN PRN
Status: CANCELLED | OUTPATIENT
Start: 2021-06-11

## 2021-05-28 RX ORDER — HEPARIN SODIUM,PORCINE 10 UNIT/ML
5 VIAL (ML) INTRAVENOUS
Status: CANCELLED | OUTPATIENT
Start: 2021-06-01

## 2021-05-28 RX ORDER — MEPERIDINE HYDROCHLORIDE 25 MG/ML
25 INJECTION INTRAMUSCULAR; INTRAVENOUS; SUBCUTANEOUS EVERY 30 MIN PRN
Status: CANCELLED | OUTPATIENT
Start: 2021-06-08

## 2021-05-28 RX ORDER — MEPERIDINE HYDROCHLORIDE 25 MG/ML
25 INJECTION INTRAMUSCULAR; INTRAVENOUS; SUBCUTANEOUS EVERY 30 MIN PRN
Status: CANCELLED | OUTPATIENT
Start: 2021-06-01

## 2021-05-28 RX ORDER — LORAZEPAM 2 MG/ML
0.5 INJECTION INTRAMUSCULAR EVERY 4 HOURS PRN
Status: CANCELLED | OUTPATIENT
Start: 2021-06-04

## 2021-05-28 RX ORDER — NALOXONE HYDROCHLORIDE 0.4 MG/ML
.1-.4 INJECTION, SOLUTION INTRAMUSCULAR; INTRAVENOUS; SUBCUTANEOUS
Status: CANCELLED | OUTPATIENT
Start: 2021-06-08

## 2021-05-28 RX ORDER — DIPHENHYDRAMINE HYDROCHLORIDE 50 MG/ML
50 INJECTION INTRAMUSCULAR; INTRAVENOUS
Status: CANCELLED
Start: 2021-06-01

## 2021-05-28 RX ORDER — ALBUTEROL SULFATE 90 UG/1
1-2 AEROSOL, METERED RESPIRATORY (INHALATION)
Status: CANCELLED
Start: 2021-06-11

## 2021-05-28 RX ORDER — HEPARIN SODIUM (PORCINE) LOCK FLUSH IV SOLN 100 UNIT/ML 100 UNIT/ML
5 SOLUTION INTRAVENOUS
Status: CANCELLED | OUTPATIENT
Start: 2021-06-08

## 2021-05-28 RX ORDER — METHYLPREDNISOLONE SODIUM SUCCINATE 125 MG/2ML
125 INJECTION, POWDER, LYOPHILIZED, FOR SOLUTION INTRAMUSCULAR; INTRAVENOUS
Status: CANCELLED
Start: 2021-06-11

## 2021-05-28 RX ORDER — DIPHENHYDRAMINE HYDROCHLORIDE 50 MG/ML
50 INJECTION INTRAMUSCULAR; INTRAVENOUS
Status: CANCELLED
Start: 2021-06-04

## 2021-05-28 RX ORDER — HEPARIN SODIUM (PORCINE) LOCK FLUSH IV SOLN 100 UNIT/ML 100 UNIT/ML
5 SOLUTION INTRAVENOUS
Status: CANCELLED | OUTPATIENT
Start: 2021-06-04

## 2021-05-28 RX ORDER — DIPHENHYDRAMINE HCL 50 MG
50 CAPSULE ORAL ONCE
Status: CANCELLED | OUTPATIENT
Start: 2021-06-01

## 2021-05-28 RX ORDER — NALOXONE HYDROCHLORIDE 0.4 MG/ML
.1-.4 INJECTION, SOLUTION INTRAMUSCULAR; INTRAVENOUS; SUBCUTANEOUS
Status: CANCELLED | OUTPATIENT
Start: 2021-06-11

## 2021-05-28 RX ORDER — HEPARIN SODIUM (PORCINE) LOCK FLUSH IV SOLN 100 UNIT/ML 100 UNIT/ML
5 SOLUTION INTRAVENOUS
Status: CANCELLED | OUTPATIENT
Start: 2021-06-01

## 2021-05-28 RX ORDER — NALOXONE HYDROCHLORIDE 0.4 MG/ML
.1-.4 INJECTION, SOLUTION INTRAMUSCULAR; INTRAVENOUS; SUBCUTANEOUS
Status: CANCELLED | OUTPATIENT
Start: 2021-06-04

## 2021-05-28 RX ORDER — MEPERIDINE HYDROCHLORIDE 25 MG/ML
25 INJECTION INTRAMUSCULAR; INTRAVENOUS; SUBCUTANEOUS EVERY 30 MIN PRN
Status: CANCELLED | OUTPATIENT
Start: 2021-06-04

## 2021-05-28 RX ORDER — EPINEPHRINE 1 MG/ML
0.3 INJECTION, SOLUTION, CONCENTRATE INTRAVENOUS EVERY 5 MIN PRN
Status: CANCELLED | OUTPATIENT
Start: 2021-06-04

## 2021-05-28 RX ORDER — HEPARIN SODIUM,PORCINE 10 UNIT/ML
5 VIAL (ML) INTRAVENOUS
Status: CANCELLED | OUTPATIENT
Start: 2021-06-08

## 2021-05-28 RX ORDER — ALBUTEROL SULFATE 0.83 MG/ML
2.5 SOLUTION RESPIRATORY (INHALATION)
Status: CANCELLED | OUTPATIENT
Start: 2021-06-01

## 2021-05-28 RX ORDER — HEPARIN SODIUM,PORCINE 10 UNIT/ML
5 VIAL (ML) INTRAVENOUS
Status: CANCELLED | OUTPATIENT
Start: 2021-06-11

## 2021-05-28 RX ORDER — ALBUTEROL SULFATE 90 UG/1
1-2 AEROSOL, METERED RESPIRATORY (INHALATION)
Status: CANCELLED
Start: 2021-06-04

## 2021-05-28 RX ORDER — LORAZEPAM 2 MG/ML
0.5 INJECTION INTRAMUSCULAR EVERY 4 HOURS PRN
Status: CANCELLED | OUTPATIENT
Start: 2021-06-01

## 2021-05-28 RX ORDER — METHYLPREDNISOLONE SODIUM SUCCINATE 125 MG/2ML
125 INJECTION, POWDER, LYOPHILIZED, FOR SOLUTION INTRAMUSCULAR; INTRAVENOUS
Status: CANCELLED
Start: 2021-06-01

## 2021-05-28 RX ORDER — SODIUM CHLORIDE 9 MG/ML
1000 INJECTION, SOLUTION INTRAVENOUS CONTINUOUS PRN
Status: CANCELLED
Start: 2021-06-08

## 2021-05-28 RX ORDER — LORAZEPAM 2 MG/ML
0.5 INJECTION INTRAMUSCULAR EVERY 4 HOURS PRN
Status: CANCELLED | OUTPATIENT
Start: 2021-06-08

## 2021-05-28 RX ORDER — DEXAMETHASONE 4 MG/1
TABLET ORAL
Qty: 20 TABLET | Refills: 3 | COMMUNITY
Start: 2021-05-28 | End: 2021-08-13

## 2021-05-28 RX ORDER — ALBUTEROL SULFATE 90 UG/1
1-2 AEROSOL, METERED RESPIRATORY (INHALATION)
Status: CANCELLED
Start: 2021-06-08

## 2021-05-28 RX ORDER — ALBUTEROL SULFATE 0.83 MG/ML
2.5 SOLUTION RESPIRATORY (INHALATION)
Status: CANCELLED | OUTPATIENT
Start: 2021-06-08

## 2021-05-28 RX ORDER — HEPARIN SODIUM (PORCINE) LOCK FLUSH IV SOLN 100 UNIT/ML 100 UNIT/ML
5 SOLUTION INTRAVENOUS
Status: CANCELLED | OUTPATIENT
Start: 2021-06-11

## 2021-05-28 RX ORDER — METHYLPREDNISOLONE SODIUM SUCCINATE 125 MG/2ML
125 INJECTION, POWDER, LYOPHILIZED, FOR SOLUTION INTRAMUSCULAR; INTRAVENOUS
Status: CANCELLED
Start: 2021-06-08

## 2021-05-28 RX ORDER — SODIUM CHLORIDE 9 MG/ML
1000 INJECTION, SOLUTION INTRAVENOUS CONTINUOUS PRN
Status: CANCELLED
Start: 2021-06-04

## 2021-05-28 RX ORDER — LORAZEPAM 2 MG/ML
0.5 INJECTION INTRAMUSCULAR EVERY 4 HOURS PRN
Status: CANCELLED | OUTPATIENT
Start: 2021-06-11

## 2021-05-28 RX ORDER — EPINEPHRINE 1 MG/ML
0.3 INJECTION, SOLUTION, CONCENTRATE INTRAVENOUS EVERY 5 MIN PRN
Status: CANCELLED | OUTPATIENT
Start: 2021-06-08

## 2021-05-28 ASSESSMENT — MIFFLIN-ST. JEOR: SCORE: 1255.13

## 2021-05-28 ASSESSMENT — PAIN SCALES - GENERAL: PAINLEVEL: NO PAIN (0)

## 2021-05-28 NOTE — PATIENT INSTRUCTIONS
We would like to see you back per your schedule. You will receive your Zometa when you come next week.    When you are in need of a refill, please call your pharmacy and they will send us a request.     If you have any questions please call [phone]    Other instructions:      Take 20mg of dexamethasone weekly on Tuesday.

## 2021-05-28 NOTE — PROGRESS NOTES
Oncology Follow-up Visit:  May 28, 2021    Reason for Visit:  Patient presents with:  RECHECK: follow up multiple myeloma     Nursing Note and documentation reviewed: yes    HPI:  This is a 70-year-old female patient who presents to the oncology clinic today for evaluation prior to receiving cycle 8 day 1 therapy for Stage II-RISS IgG multiple myeloma diagnosed June 2019.  She progressed on Velcade and dexamethasone and CyBorD and treated was changed to Darzalex faspro injection. M-spike went from 1.0 to 1.2 in March so Velcade and dex were added to treatment plan.     She presents to the clinic today stating she is doing good.  She returned from her week vacation that was at the type of art camp.  She states it was everything she had hoped it would be.  She did not receive her day 8 and day 11 therapy of cycle 7 related to her trip.  She has no new complaints and is tolerating therapy well.  She continues with dental appointments at least every 6 months is having no issues with her teeth or her jaw.      She tells me today that she is not taking any extra dexamethasone other than what she is given here at her treatment.    Oncologic History:     12/31/2018   patient presented to the emergency room with vertigo and fatigue.  CT scan of the head was negative and subsequent stress test was negative.  5/3/2019  She was seen by her PCP who ordered lab work and noted a total protein of 12.9.  SPEP at that time showed an M spike of 6.2 with a large monoclonal protein seen in the gamma fraction.  Urine immunofixation showed a possible small protein band in the gamma fraction  5/31/2019  she was evaluated by Dr. Walker with Medical Oncology with plan to rule out myeloma and obtain bone marrow aspiration biopsy as well as a metastatic bone survey along with additional labwork  6/18/2019 she underwent bone marrow aspiration and biopsy  6/24/2019  She was seen again by Dr. Walker and CBC showed a hemoglobin of 9.3, M spike  7.3 with monoclonal IgG immunoglobulin of kappa light chain type; serum viscosity was 2.9; quantitative immunoglobulins showed an IgG of 8160, beta-2 microglobulin was 5.8, BUN was 21 with creatinine is 0.8 and total protein was 13.7.  Quantitative kappa/lambda free light chains showed an elevated ratio of 17.0 bone marrow aspiration biopsy showed plasma cell myeloma with approximately 80% plasma cells.  Immunofixation showed IgG kappa and flow cytometry revealed kappa monotypic plasma cells consistent with clonal plasma cell neoplasm and FISH panel was pending at that time.  It was felt she had at least stage II disease based on her beta-2 microglobulin and anemia.  Plan was to treat with Velcade 1.3 mg per metered squared days 1, 4, 8 and 11/Decadron 40 mg on days 1, 8 and 15 initiation of Revlimid with the second cycle at 25 mg daily days 1 through 14.  Plan was to also obtain an MRI of the lumbar spine to rule out lytic lesion at L3.  She was initiated on Zovirax 400 mg p.o. twice daily.  6/25/2019  1st cycle of chemotherapy initiated  7/1/2019 note in chart regarding patient's large co-pay for the Revlimid and no plan at this point to initiate Revlimid and treat only with Velcade and Decadron per Dr. Walker  7/11/2019  MRI lumbar spine shows a pathologic superior endplate compression fracture at L3 without evidence of retropulsed fragment and innumerable enhancing lesions throughout the lumbar spine consistent with history of multiple myeloma.  9/10/2019  Increasing M-spike and kappa lambda ratio  10/1/2019  Initiation of CyBorD  12/1/2020  Initiation of Darzalex Faspro injection  3/23/2021  M-spike increased form 1.0 to 1.2 and velcade and dexamethasone added to plan     Current Chemo Regime/TX:  Darzalex faspro injection 1800mg subcutaneous per protocol with velcade 1.3mg/m2 and dexamethasone 20mg added to day 4, 8, 11 with cycle given every 21 days      **Zometa 4mg every 3 months  Current Cycle: 8 day  "1 (cycle 5 added velcade dex)  # of completed cycles:  7     Previous treatment:   Velcade 1.3 mg/m2 days 1, 4, 8 and 11 with Decadron 40 mg days 1, 8 and 15 x 4 cycles;   Velcade 1.5mg.m2/cyclophosphamide 150mg every 7 days on days 1,8,15 and 22/decadron 40mg days 1,8,15,22 ; Darzalex faspro injection 1800mg subcutaneous per protocol    Past Medical History:   Diagnosis Date     Arthritis      Depressive disorder      Diabetes mellitus, type 2 (H) 1/18/2021     Essential hypertension 10/1/2015     Major depressive disorder, recurrent episode, mild (H) 10/1/2015     Mixed hyperlipidemia 10/1/2015     Multiple myeloma not having achieved remission (H) 6/24/2019     Other specified disorders of bladder 07/09/2012    Irritable Bladder     Seasonal allergies 10/1/2015     Unspecified essential hypertension 03/19/2007     Unspecified sinusitis (chronic) 09/05/2007       Past Surgical History:   Procedure Laterality Date     APPENDECTOMY       BONE MARROW BIOPSY, BONE SPECIMEN, NEEDLE/TROCAR N/A 6/18/2019    Procedure: BONE MARROW BIOPSY;  Surgeon: Maciej Sanz MD;  Location: [facility]     CHOLECYSTECTOMY       COLONOSCOPY  07-    repeat 10 years     COLONOSCOPY N/A 12/30/2016    Procedure: COLONOSCOPY;  Surgeon: Bhaskar Franklin DO;  Location: [facility]     SINUS SURGERY       TUBAL STERILIZATION         Family History   Problem Relation Age of Onset     Breast Cancer Mother 66        Cause of Death     Parkinsonism Father         \"Possible\"     Coronary Artery Disease Father      Pacemaker Father      Thyroid Disease Daughter      Diabetes No family hx of      Hypertension No family hx of      Hyperlipidemia No family hx of      Cerebrovascular Disease No family hx of      Colon Cancer No family hx of      Prostate Cancer No family hx of      Genetic Disorder No family hx of      Asthma No family hx of      Anesthesia Reaction No family hx of        Social History     Socioeconomic History     Marital status: "      Spouse name: Not on file     Number of children: Not on file     Years of education: Not on file     Highest education level: Not on file   Occupational History     Occupation: Financial     Comment:  - ([employer])   Social Needs     Financial resource strain: Not on file     Food insecurity     Worry: Not on file     Inability: Not on file     Transportation needs     Medical: Not on file     Non-medical: Not on file   Tobacco Use     Smoking status: Never Smoker     Smokeless tobacco: Never Used     Tobacco comment: Tried to Quit (YES); QUIT in 1971; Passive Exposure (NO)   Substance and Sexual Activity     Alcohol use: Yes     Comment: RARELY     Drug use: No     Sexual activity: Yes     Partners: Male     Birth control/protection: None   Lifestyle     Physical activity     Days per week: Not on file     Minutes per session: Not on file     Stress: Not on file   Relationships     Social connections     Talks on phone: Not on file     Gets together: Not on file     Attends Quaker service: Not on file     Active member of club or organization: Not on file     Attends meetings of clubs or organizations: Not on file     Relationship status: Not on file     Intimate partner violence     Fear of current or ex partner: Not on file     Emotionally abused: Not on file     Physically abused: Not on file     Forced sexual activity: Not on file   Other Topics Concern      Service Not Asked     Blood Transfusions Not Asked     Caffeine Concern Yes     Comment: Coffee >6 cups daily     Occupational Exposure Not Asked     Hobby Hazards Not Asked     Sleep Concern Not Asked     Stress Concern Not Asked     Weight Concern Not Asked     Special Diet Not Asked     Back Care Not Asked     Exercise Not Asked     Bike Helmet Not Asked     Seat Belt Not Asked     Self-Exams Not Asked     Parent/sibling w/ CABG, MI or angioplasty before 65F 55M? No   Social History Narrative     Not on file       Current  "Outpatient Medications   Medication     acyclovir (ZOVIRAX) 400 MG tablet     aspirin (ASA) 81 MG chewable tablet     atorvastatin (LIPITOR) 10 MG tablet     dexamethasone (DECADRON) 4 MG tablet     fluticasone (FLONASE) 50 MCG/ACT nasal spray     losartan (COZAAR) 50 MG tablet     sertraline (ZOLOFT) 50 MG tablet     EPINEPHrine (EPIPEN) 0.3 MG/0.3ML injection     prochlorperazine (COMPAZINE) 10 MG tablet     No current facility-administered medications for this visit.         Allergies   Allergen Reactions     Lisinopril Cough     Phenylephrine Hcl Other (See Comments)     **Entex  HEADACHE (SEVERE)     Phenylpropanolamine Other (See Comments)     **Entex  HEADACHE (SEVERE)     Pseudoephedrine Tannate Other (See Comments)     **Entex  HEADACHE (SEVERE)     Levofloxacin Rash     **Levaquin     Moxifloxacin Hcl [Avelox] Rash       Review Of Systems:  Constitutional:    denies fever, weight changes, chills, and night sweats.  Eyes:    denies blurred or double vision; fuzzy at times with reading   Ears/Nose/Throat:   denies ear pain, nose problems, difficulty swallowing  Respiratory:   denies shortness of breath, cough   Skin:   denies rash, lesions  Cardiovascular:   denies chest pain, palpitations, slight edema to her hands  Gastrointestinal:   denies abdominal pain, bloating, nausea, early satiety; no change in bowel habits or blood in stool  Genitourinary:   denies difficulty with urination, blood in urine  Musculoskeletal:    denies new muscle pain, bone pain  Neurologic:   denies lightheadedness, headaches, numbness or tingling  Psychiatric:   denies anxiety, depression  Hematologic/Lymphatic/Immunologic:   denies easy bruising, easy bleeding, no new lumps noted  Endocrine:   Denies increased thirst    Fatigue:  moderate    ECOG Performance Status: 1    Physical Exam:  /72   Pulse 79   Temp 98.5  F (36.9  C) (Tympanic)   Resp 16   Ht 1.6 m (5' 3\")   Wt 77.6 kg (171 lb 1.2 oz)   SpO2 95%   BMI 30.30 " kg/m      GENERAL APPEARANCE: Healthy, alert and in no acute distress.  HEENT: Normocephalic, Sclerae anicteric.  No oral lesions or thrush.  NECK:   No asymmetry or masses, no thyromegaly.  LYMPHATICS: No palpable cervical, supraclavicular, axillary, or inguinal nodes   RESP: Lungs clear to auscultation bilaterally, respirations regular and easy  CARDIOVASCULAR: Mostly Regular rate and rhythm with occasional skipped beat. Normal S1, S2; no murmur, gallop, or rub.  ABDOMEN: Soft, nontender. Bowel sounds auscultated all 4 quadrants. No palpable organomegaly or masses.  MUSCULOSKELETAL: Extremities without gross deformities noted. No edema of bilateral lower extremities.  NEURO: Alert and oriented x 3.  Gait steady.  PSYCHIATRIC: Mentation and affect appear normal.  Mood appropriate.    Laboratory:  Component      Latest Ref Rng & Units 5/25/2021   Albumin Fraction      3.7 - 5.1 g/dL 4.2   Alpha 1 Fraction      0.2 - 0.4 g/dL 0.4   Alpha 2 Fraction      0.5 - 0.9 g/dL 0.8   Beta Fraction      0.6 - 1.0 g/dL 0.6   Gamma Fraction      0.7 - 1.6 g/dL 1.1   Monoclonal Peak      0.0 g/dL 0.8 (H)   ELP Interpretation:       Reasonably large monoclonal protein (about 0.8 g/dL) seen in the gamma fraction.  See . . .   Immunofixation ELP       (Note)   IGG      610 - 1,616 mg/dL 1,277   IGA      84 - 499 mg/dL 8 (L)   IGM      35 - 242 mg/dL 22 (L)   [place] Free Lt Chain      0.33 - 1.94 mg/dL 0.42   Lambda Free Lt Chain      0.57 - 2.63 mg/dL 0.23 (L)   Kappa Lambda Ratio      0.26 - 1.65 1.83 (H)     Imaging Studies: None completed for today's visit      ASSESSMENT/PLAN:    #1 Multiple myeloma:   IgG kappa multiple myeloma with 80% involvement of the bone marrow diagnosed June 2019 initially treated with Velcade and Decadron and received 4 cycles with increasing M-spike and kappa/lambda ratio.  Treatment changed to CyBorD on 10/1/2019.  M-spike plateau at 1.2 and treatment changed to monotherapy with Darzalex Faspro  injection.     M spike declined to 1.0 then increased again to 1.2 so Velcade and Dex added to her treatment plan with cycle 5 therapy.  M-spike down to 0.8.  Will initiate cycle 8 therapy next week after a CBC and CMP are drawn and she is evaluated and meets parameters and patient will follow up prior to cycle 9 with a CBC, CMP, SPEP, protein immunofixation and kappa lambda ratio.      We did discuss the fact that she should be taking dexamethasone 20 mg on day 8 and day 15 so that she is getting a dose of dexamethasone each week.  I will remove the Decadron 20 mg prior to day 1 from her treatment plan and patient will continue to take 20 mg of dexamethasone weekly on Tuesdays so she receives this prior to her treatment.  This is easier for the patient to remember so her prescription was changed.    #2  Bone lesions: Continue Zometa every 3 months.  She will receive this next week.  We did discuss the importance of continuing with every 6-month dental exams and being sure that her dentist is aware she is on this therapy.     I encouraged patient to call with any questions or concerns.      Barbie Quintana, NP  APRN, FNP-BC, AOCNP

## 2021-05-28 NOTE — NURSING NOTE
"Chief Complaint   Patient presents with     RECHECK     follow up multiple myeloma       Initial /72   Pulse 79   Temp 98.5  F (36.9  C) (Tympanic)   Resp 16   Ht 1.6 m (5' 3\")   Wt 77.6 kg (171 lb 1.2 oz)   SpO2 95%   BMI 30.30 kg/m   Estimated body mass index is 30.3 kg/m  as calculated from the following:    Height as of this encounter: 1.6 m (5' 3\").    Weight as of this encounter: 77.6 kg (171 lb 1.2 oz).  Medication Reconciliation: complete  Julia Anderson RN    "

## 2021-06-01 ENCOUNTER — TRANSFERRED RECORDS (OUTPATIENT)
Dept: MULTI SPECIALTY CLINIC | Facility: CLINIC | Age: 71
End: 2021-06-01

## 2021-06-01 ENCOUNTER — INFUSION THERAPY VISIT (OUTPATIENT)
Dept: INFUSION THERAPY | Facility: OTHER | Age: 71
End: 2021-06-01
Attending: INTERNAL MEDICINE
Payer: MEDICARE

## 2021-06-01 VITALS
BODY MASS INDEX: 29.88 KG/M2 | WEIGHT: 168.65 LBS | SYSTOLIC BLOOD PRESSURE: 122 MMHG | DIASTOLIC BLOOD PRESSURE: 81 MMHG | HEART RATE: 77 BPM | RESPIRATION RATE: 17 BRPM | TEMPERATURE: 99.1 F | OXYGEN SATURATION: 93 %

## 2021-06-01 DIAGNOSIS — C90.00 MULTIPLE MYELOMA NOT HAVING ACHIEVED REMISSION (H): Primary | ICD-10-CM

## 2021-06-01 LAB
ALBUMIN SERPL-MCNC: 3.5 G/DL (ref 3.4–5)
ALP SERPL-CCNC: 115 U/L (ref 40–150)
ALT SERPL W P-5'-P-CCNC: 22 U/L (ref 0–50)
ANION GAP SERPL CALCULATED.3IONS-SCNC: 6 MMOL/L (ref 3–14)
AST SERPL W P-5'-P-CCNC: 18 U/L (ref 0–45)
BASOPHILS # BLD AUTO: 0 10E9/L (ref 0–0.2)
BASOPHILS NFR BLD AUTO: 0.7 %
BILIRUB SERPL-MCNC: 0.4 MG/DL (ref 0.2–1.3)
BUN SERPL-MCNC: 16 MG/DL (ref 7–30)
CALCIUM SERPL-MCNC: 9 MG/DL (ref 8.5–10.1)
CHLORIDE SERPL-SCNC: 104 MMOL/L (ref 94–109)
CO2 SERPL-SCNC: 27 MMOL/L (ref 20–32)
CREAT SERPL-MCNC: 0.73 MG/DL (ref 0.52–1.04)
DIFFERENTIAL METHOD BLD: NORMAL
EOSINOPHIL # BLD AUTO: 0.2 10E9/L (ref 0–0.7)
EOSINOPHIL NFR BLD AUTO: 3.3 %
ERYTHROCYTE [DISTWIDTH] IN BLOOD BY AUTOMATED COUNT: 14.2 % (ref 10–15)
GFR SERPL CREATININE-BSD FRML MDRD: 81 ML/MIN/{1.73_M2}
GLUCOSE SERPL-MCNC: 98 MG/DL (ref 70–99)
HCT VFR BLD AUTO: 37.9 % (ref 35–47)
HGB BLD-MCNC: 12.9 G/DL (ref 11.7–15.7)
IMM GRANULOCYTES # BLD: 0 10E9/L (ref 0–0.4)
IMM GRANULOCYTES NFR BLD: 0.7 %
LYMPHOCYTES # BLD AUTO: 1 10E9/L (ref 0.8–5.3)
LYMPHOCYTES NFR BLD AUTO: 22.5 %
MCH RBC QN AUTO: 31.7 PG (ref 26.5–33)
MCHC RBC AUTO-ENTMCNC: 34 G/DL (ref 31.5–36.5)
MCV RBC AUTO: 93 FL (ref 78–100)
MONOCYTES # BLD AUTO: 0.6 10E9/L (ref 0–1.3)
MONOCYTES NFR BLD AUTO: 12.8 %
NEUTROPHILS # BLD AUTO: 2.7 10E9/L (ref 1.6–8.3)
NEUTROPHILS NFR BLD AUTO: 60 %
NRBC # BLD AUTO: 0 10*3/UL
NRBC BLD AUTO-RTO: 0 /100
PLATELET # BLD AUTO: 253 10E9/L (ref 150–450)
POTASSIUM SERPL-SCNC: 3.9 MMOL/L (ref 3.4–5.3)
PROT SERPL-MCNC: 7 G/DL (ref 6.8–8.8)
RBC # BLD AUTO: 4.07 10E12/L (ref 3.8–5.2)
RETINOPATHY: NORMAL
SODIUM SERPL-SCNC: 137 MMOL/L (ref 133–144)
WBC # BLD AUTO: 4.5 10E9/L (ref 4–11)

## 2021-06-01 PROCEDURE — 250N000011 HC RX IP 250 OP 636: Performed by: NURSE PRACTITIONER

## 2021-06-01 PROCEDURE — 96401 CHEMO ANTI-NEOPL SQ/IM: CPT

## 2021-06-01 PROCEDURE — 250N000013 HC RX MED GY IP 250 OP 250 PS 637: Performed by: NURSE PRACTITIONER

## 2021-06-01 PROCEDURE — 80053 COMPREHEN METABOLIC PANEL: CPT | Mod: ZL | Performed by: NURSE PRACTITIONER

## 2021-06-01 PROCEDURE — 85025 COMPLETE CBC W/AUTO DIFF WBC: CPT | Mod: ZL | Performed by: NURSE PRACTITIONER

## 2021-06-01 PROCEDURE — 36415 COLL VENOUS BLD VENIPUNCTURE: CPT | Mod: ZL | Performed by: NURSE PRACTITIONER

## 2021-06-01 RX ORDER — ACETAMINOPHEN 325 MG/1
650 TABLET ORAL ONCE
Status: COMPLETED | OUTPATIENT
Start: 2021-06-01 | End: 2021-06-01

## 2021-06-01 RX ORDER — DEXAMETHASONE 4 MG/1
20 TABLET ORAL
Qty: 15 TABLET | Refills: 0 | COMMUNITY
Start: 2021-06-01 | End: 2021-07-13

## 2021-06-01 RX ORDER — DIPHENHYDRAMINE HCL 50 MG
50 CAPSULE ORAL ONCE
Status: COMPLETED | OUTPATIENT
Start: 2021-06-01 | End: 2021-06-01

## 2021-06-01 RX ADMIN — ACETAMINOPHEN 650 MG: 325 TABLET, FILM COATED ORAL at 12:08

## 2021-06-01 RX ADMIN — DIPHENHYDRAMINE HYDROCHLORIDE 50 MG: 50 CAPSULE ORAL at 12:08

## 2021-06-01 RX ADMIN — DARATUMUMAB AND HYALURONIDASE-FIHJ (HUMAN RECOMBINANT) 1800 MG: 1800; 30000 INJECTION SUBCUTANEOUS at 12:38

## 2021-06-01 RX ADMIN — BORTEZOMIB 2.4 MG: 3.5 INJECTION, POWDER, LYOPHILIZED, FOR SOLUTION INTRAVENOUS; SUBCUTANEOUS at 12:36

## 2021-06-01 ASSESSMENT — PAIN SCALES - GENERAL: PAINLEVEL: MILD PAIN (2)

## 2021-06-01 NOTE — PROGRESS NOTES
Patient is 70 years old, here today accompanied by self for injection of Faspro per order of Dr. Walker under the supervision of Dr. Walker     Patient lab values are WNL.     Labs meet parameters for today's injection.     Patient denies questions nor concerns regarding medication, administration site, side effects, nor aftercare.  Patient identified with two identifiers, order verified, and verbal consent for today's injection obtained from patient.   Patient education provided on s/s of injection site infection, and/or medication specific side effects, and when to call a provider.  Patient instructed to report any adverse effects.     Medication administered per protocol in left upper abdomen at 45 degree angle.  Site covered with sterile bandage.  Patient tolerated injection well, no verbal nor non-verbal signs of discomfort noted.  No adverse effects noted at this time.     Patient instructed to call with further questions or concerns.  Patient states understanding and is in agreement with this plan.  Copy of appointments, and after visit summary (AVS) given to patient. Patient discharged.

## 2021-06-01 NOTE — PROGRESS NOTES
Patient is a 70 year old female her today for injection of Velcade per order of . Patient identified with two identifiers, order verified, and verbal consent for today's infusion obtained from patient. Written consent for treatment is on file and valid.    Recent lab values: reviewed and are WNL Patient meets order parameters for today's treatment.    Velcade dose verified with Barron RICH RN, prior to release of drug.      Patient denies questions or concerns regarding injection and/or medication(s) being administered.    Velcade injected SQ into Left back upper arm at 45 degree angle per protocol rotating sites. Patient tolerated injection without incident, no signs or symptoms of adverse reaction noted. Patient denies pain or discomfort.     Covered with a sterile bandage. Pt instructed to leave bandage intact for a minimum of one hour, and to call with questions or concerns. Copy of appointments, discharge instructions, and after visit summary (AVS) provided to patient. Patient states understanding, discharged.

## 2021-06-04 ENCOUNTER — INFUSION THERAPY VISIT (OUTPATIENT)
Dept: INFUSION THERAPY | Facility: OTHER | Age: 71
End: 2021-06-04
Attending: INTERNAL MEDICINE
Payer: MEDICARE

## 2021-06-04 VITALS
RESPIRATION RATE: 17 BRPM | BODY MASS INDEX: 29.72 KG/M2 | OXYGEN SATURATION: 95 % | DIASTOLIC BLOOD PRESSURE: 77 MMHG | SYSTOLIC BLOOD PRESSURE: 113 MMHG | HEART RATE: 86 BPM | WEIGHT: 167.77 LBS

## 2021-06-04 DIAGNOSIS — C90.00 MULTIPLE MYELOMA NOT HAVING ACHIEVED REMISSION (H): Primary | ICD-10-CM

## 2021-06-04 PROCEDURE — 250N000011 HC RX IP 250 OP 636: Mod: JW | Performed by: NURSE PRACTITIONER

## 2021-06-04 PROCEDURE — 96401 CHEMO ANTI-NEOPL SQ/IM: CPT

## 2021-06-04 RX ADMIN — BORTEZOMIB 2.4 MG: 3.5 INJECTION, POWDER, LYOPHILIZED, FOR SOLUTION INTRAVENOUS; SUBCUTANEOUS at 13:01

## 2021-06-04 ASSESSMENT — PAIN SCALES - GENERAL: PAINLEVEL: NO PAIN (0)

## 2021-06-04 NOTE — PATIENT INSTRUCTIONS
We will see you back as planned. If you have any questions or concerns, we can be reached Monday through Friday 8am - 430pm at [phone] ([facility]). If you have concerns related to a potential reaction/side effect after hours/weekends/holiday's, please seek emergent medical care.

## 2021-06-04 NOTE — PROGRESS NOTES
Patient is a 70 year old  her today for injection of Velcade per order of . Patient identified with two identifiers, order verified, and verbal consent for today's infusion obtained from patient. Written consent for treatment is on file and valid.    Recent lab values: Obtained on Tuesday Patient meets order parameters for today's treatment.    Velcade dose verified with Barron RICH RN, prior to release of drug.      Patient denies questions or concerns regarding injection and/or medication(s) being administered.    Velcade injected SQ into right  at 45 degree angle per protocol rotating sites. Patient tolerated injection without incident, no signs or symptoms of adverse reaction noted. Patient denies pain or discomfort.     Covered with a sterile bandage. Pt instructed to leave bandage intact for a minimum of one hour, and to call with questions or concerns. Copy of appointments, discharge instructions, and after visit summary (AVS) provided to patient. Patient states understanding, discharged.

## 2021-06-08 ENCOUNTER — INFUSION THERAPY VISIT (OUTPATIENT)
Dept: INFUSION THERAPY | Facility: OTHER | Age: 71
End: 2021-06-08
Attending: INTERNAL MEDICINE
Payer: MEDICARE

## 2021-06-08 VITALS — BODY MASS INDEX: 29.52 KG/M2 | WEIGHT: 166.67 LBS

## 2021-06-08 DIAGNOSIS — C90.00 MULTIPLE MYELOMA NOT HAVING ACHIEVED REMISSION (H): Primary | ICD-10-CM

## 2021-06-08 LAB
ALBUMIN SERPL-MCNC: 3.3 G/DL (ref 3.4–5)
BASOPHILS # BLD AUTO: 0 10E9/L (ref 0–0.2)
BASOPHILS NFR BLD AUTO: 0.3 %
CALCIUM SERPL-MCNC: 8.3 MG/DL (ref 8.5–10.1)
CREAT SERPL-MCNC: 0.68 MG/DL (ref 0.52–1.04)
DIFFERENTIAL METHOD BLD: ABNORMAL
EOSINOPHIL # BLD AUTO: 0.2 10E9/L (ref 0–0.7)
EOSINOPHIL NFR BLD AUTO: 5.3 %
ERYTHROCYTE [DISTWIDTH] IN BLOOD BY AUTOMATED COUNT: 13.9 % (ref 10–15)
GFR SERPL CREATININE-BSD FRML MDRD: 87 ML/MIN/{1.73_M2}
HCT VFR BLD AUTO: 36.2 % (ref 35–47)
HGB BLD-MCNC: 12.5 G/DL (ref 11.7–15.7)
IMM GRANULOCYTES # BLD: 0 10E9/L (ref 0–0.4)
IMM GRANULOCYTES NFR BLD: 1 %
LYMPHOCYTES # BLD AUTO: 1.2 10E9/L (ref 0.8–5.3)
LYMPHOCYTES NFR BLD AUTO: 31.6 %
MCH RBC QN AUTO: 31.6 PG (ref 26.5–33)
MCHC RBC AUTO-ENTMCNC: 34.5 G/DL (ref 31.5–36.5)
MCV RBC AUTO: 92 FL (ref 78–100)
MONOCYTES # BLD AUTO: 0.9 10E9/L (ref 0–1.3)
MONOCYTES NFR BLD AUTO: 23.4 %
NEUTROPHILS # BLD AUTO: 1.5 10E9/L (ref 1.6–8.3)
NEUTROPHILS NFR BLD AUTO: 38.4 %
NRBC # BLD AUTO: 0 10*3/UL
NRBC BLD AUTO-RTO: 0 /100
PLATELET # BLD AUTO: 167 10E9/L (ref 150–450)
PLATELET # BLD EST: ABNORMAL 10*3/UL
RBC # BLD AUTO: 3.95 10E12/L (ref 3.8–5.2)
RBC MORPH BLD: ABNORMAL
WBC # BLD AUTO: 3.9 10E9/L (ref 4–11)

## 2021-06-08 PROCEDURE — 85025 COMPLETE CBC W/AUTO DIFF WBC: CPT | Mod: ZL | Performed by: NURSE PRACTITIONER

## 2021-06-08 PROCEDURE — 82040 ASSAY OF SERUM ALBUMIN: CPT | Mod: ZL | Performed by: NURSE PRACTITIONER

## 2021-06-08 PROCEDURE — 36415 COLL VENOUS BLD VENIPUNCTURE: CPT | Mod: ZL | Performed by: NURSE PRACTITIONER

## 2021-06-08 PROCEDURE — 82565 ASSAY OF CREATININE: CPT | Mod: ZL | Performed by: NURSE PRACTITIONER

## 2021-06-08 PROCEDURE — 96401 CHEMO ANTI-NEOPL SQ/IM: CPT

## 2021-06-08 PROCEDURE — 82310 ASSAY OF CALCIUM: CPT | Mod: ZL | Performed by: NURSE PRACTITIONER

## 2021-06-08 PROCEDURE — 96365 THER/PROPH/DIAG IV INF INIT: CPT

## 2021-06-08 PROCEDURE — 250N000011 HC RX IP 250 OP 636: Performed by: NURSE PRACTITIONER

## 2021-06-08 PROCEDURE — 258N000003 HC RX IP 258 OP 636: Performed by: NURSE PRACTITIONER

## 2021-06-08 RX ORDER — ZOLEDRONIC ACID 0.04 MG/ML
4 INJECTION, SOLUTION INTRAVENOUS ONCE
Status: CANCELLED | OUTPATIENT
Start: 2021-06-08 | End: 2021-06-08

## 2021-06-08 RX ORDER — ZOLEDRONIC ACID 0.04 MG/ML
4 INJECTION, SOLUTION INTRAVENOUS ONCE
Status: COMPLETED | OUTPATIENT
Start: 2021-06-08 | End: 2021-06-08

## 2021-06-08 RX ADMIN — BORTEZOMIB 2.4 MG: 3.5 INJECTION, POWDER, LYOPHILIZED, FOR SOLUTION INTRAVENOUS; SUBCUTANEOUS at 14:09

## 2021-06-08 RX ADMIN — ZOLEDRONIC ACID 4 MG: 0.04 INJECTION, SOLUTION INTRAVENOUS at 13:42

## 2021-06-08 RX ADMIN — SODIUM CHLORIDE 250 ML: 9 INJECTION, SOLUTION INTRAVENOUS at 13:42

## 2021-06-08 NOTE — PROGRESS NOTES
Patient is a 70 year old female here accompanied by self today for infusion of zometa per order of Bonita Quintana under the supervision of Dr. Walker  Patient identified with two identifiers, order verified, and verbal consent for today's infusion obtained from patient.      Patient  lab values:  Reviewed and Patient meets order parameters for today's treatment.     1342  IV pump verified with dose, drug, and rate of administration.  Infusion administered per protocol.  Patient tolerated infusion well, no signs or symptoms of adverse reaction noted.  Patient denies pain nor discomfort.     IV removed, catheter intact.  Site clean, dry and intact.  No signs or symptoms of infiltration or infection.  Covered with a sterile bandage, slight pressure applied for 30 seconds.  Pt instructed to leave bandage intact for a minimum of one hour, and to call with questions or concerns.  Copy of appointments, discharge instructions, and after visit summary (AVS) provided to patient.  Patient states understanding, discharged.

## 2021-06-08 NOTE — PROGRESS NOTES
Patient is a 70 her today for injection of Velcade per order of . Patient identified with two identifiers, order verified, and verbal consent for today's infusion obtained from patient. Written consent for treatment is on file and valid.    Recent lab values: reviewed and patient meets paremeters.     Velcade dose verified with Yancy KHANNA RN, prior to release of drug.      Patient denies questions or concerns regarding injection and/or medication(s) being administered.    Velcade injected SQ into right back arm at 45 degree angle per protocol rotating sites. Patient tolerated injection without incident, no signs or symptoms of adverse reaction noted. Patient denies pain or discomfort.     Covered with a sterile bandage. Pt instructed to leave bandage intact for a minimum of one hour, and to call with questions or concerns. Copy of appointments, discharge instructions, and after visit summary (AVS) provided to patient. Patient states understanding, discharged.

## 2021-06-09 ENCOUNTER — TELEPHONE (OUTPATIENT)
Dept: CASE MANAGEMENT | Facility: HOSPITAL | Age: 71
End: 2021-06-09

## 2021-06-09 NOTE — PROGRESS NOTES
Care Transitions focused note:       Spoke to patient on the telephone regarding co-pays for her chemo drug(s).  Reviewed patients insurance plan and what she has paid out of pocket this year.  Patient has a max out of pocket amount of $3,000.  Based on what patient states she has already paid, she would only have an additional $42.50 left to pay and she will hit the max.  Asked patient to monitor next incoming bills and to report back to this SW.      Also researching a few patient and co=pay assistance programs for patient.  There are cancer specific and drug specific options which might assist.  Told patient this SW would mail whatever she finds out to her.    Patient has this SW contact information and will call with any questions or concerns.

## 2021-06-11 ENCOUNTER — INFUSION THERAPY VISIT (OUTPATIENT)
Dept: INFUSION THERAPY | Facility: OTHER | Age: 71
End: 2021-06-11
Attending: INTERNAL MEDICINE
Payer: MEDICARE

## 2021-06-11 VITALS
OXYGEN SATURATION: 96 % | WEIGHT: 167.11 LBS | SYSTOLIC BLOOD PRESSURE: 117 MMHG | TEMPERATURE: 97.9 F | DIASTOLIC BLOOD PRESSURE: 75 MMHG | HEART RATE: 74 BPM | BODY MASS INDEX: 29.6 KG/M2 | RESPIRATION RATE: 16 BRPM

## 2021-06-11 DIAGNOSIS — C90.00 MULTIPLE MYELOMA NOT HAVING ACHIEVED REMISSION (H): Primary | ICD-10-CM

## 2021-06-11 PROCEDURE — 250N000011 HC RX IP 250 OP 636: Mod: JW | Performed by: NURSE PRACTITIONER

## 2021-06-11 PROCEDURE — 96401 CHEMO ANTI-NEOPL SQ/IM: CPT

## 2021-06-11 RX ADMIN — BORTEZOMIB 2.4 MG: 3.5 INJECTION, POWDER, LYOPHILIZED, FOR SOLUTION INTRAVENOUS; SUBCUTANEOUS at 13:39

## 2021-06-11 NOTE — PROGRESS NOTES
Patient is 70 year old, here today for injection of Velcade per order of Dr Walker.    Independent dose check completed with MARKO Quintanilla RN prior to release of drug.    Patient denies questions nor concerns regarding medication, administration site, side effects, nor aftercare.  Patient identified with two identifiers, order verified, and verbal consent for today's injection obtained from patient.     Patient education provided on s/s of injection site infection, and/or medication specific side effects, and when to call a provider.  Patient instructed to report any adverse effects.     Velcade injected SQ into left upper outer arm at 45 degree angle per protocol rotating sites.  Site covered with sterile bandage.  Patient tolerated injection well, no verbal nor non-verbal signs of discomfort noted.  No adverse effects noted at this time.     Patient instructed to call with further questions or concerns.  Patient states understanding and is in agreement with this plan.  Copy of appointments, and after visit summary (AVS) given to patient. Patient discharged.

## 2021-06-15 DIAGNOSIS — C90.00 MULTIPLE MYELOMA NOT HAVING ACHIEVED REMISSION (H): ICD-10-CM

## 2021-06-15 LAB
BASOPHILS # BLD AUTO: 0 10E9/L (ref 0–0.2)
BASOPHILS NFR BLD AUTO: 0.6 %
DIFFERENTIAL METHOD BLD: ABNORMAL
EOSINOPHIL # BLD AUTO: 0.2 10E9/L (ref 0–0.7)
EOSINOPHIL NFR BLD AUTO: 2.9 %
ERYTHROCYTE [DISTWIDTH] IN BLOOD BY AUTOMATED COUNT: 14.3 % (ref 10–15)
HCT VFR BLD AUTO: 39.7 % (ref 35–47)
HGB BLD-MCNC: 13.5 G/DL (ref 11.7–15.7)
IMM GRANULOCYTES # BLD: 0.3 10E9/L (ref 0–0.4)
IMM GRANULOCYTES NFR BLD: 4.5 %
LYMPHOCYTES # BLD AUTO: 1.6 10E9/L (ref 0.8–5.3)
LYMPHOCYTES NFR BLD AUTO: 21.7 %
MCH RBC QN AUTO: 31.3 PG (ref 26.5–33)
MCHC RBC AUTO-ENTMCNC: 34 G/DL (ref 31.5–36.5)
MCV RBC AUTO: 92 FL (ref 78–100)
MONOCYTES # BLD AUTO: 1.1 10E9/L (ref 0–1.3)
MONOCYTES NFR BLD AUTO: 15.8 %
NEUTROPHILS # BLD AUTO: 3.9 10E9/L (ref 1.6–8.3)
NEUTROPHILS NFR BLD AUTO: 54.5 %
NRBC # BLD AUTO: 0 10*3/UL
NRBC BLD AUTO-RTO: 1 /100
PLATELET # BLD AUTO: 143 10E9/L (ref 150–450)
RBC # BLD AUTO: 4.32 10E12/L (ref 3.8–5.2)
WBC # BLD AUTO: 7.1 10E9/L (ref 4–11)

## 2021-06-15 PROCEDURE — 82784 ASSAY IGA/IGD/IGG/IGM EACH: CPT | Mod: ZL | Performed by: NURSE PRACTITIONER

## 2021-06-15 PROCEDURE — 84165 PROTEIN E-PHORESIS SERUM: CPT | Mod: TC,ZL | Performed by: NURSE PRACTITIONER

## 2021-06-15 PROCEDURE — 85025 COMPLETE CBC W/AUTO DIFF WBC: CPT | Mod: ZL | Performed by: NURSE PRACTITIONER

## 2021-06-15 PROCEDURE — 999N001036 HC STATISTIC TOTAL PROTEIN: Mod: ZL | Performed by: NURSE PRACTITIONER

## 2021-06-15 PROCEDURE — 86334 IMMUNOFIX E-PHORESIS SERUM: CPT | Mod: TC,ZL | Performed by: NURSE PRACTITIONER

## 2021-06-15 PROCEDURE — 83883 ASSAY NEPHELOMETRY NOT SPEC: CPT | Mod: ZL | Performed by: NURSE PRACTITIONER

## 2021-06-15 PROCEDURE — 36415 COLL VENOUS BLD VENIPUNCTURE: CPT | Mod: ZL | Performed by: NURSE PRACTITIONER

## 2021-06-17 LAB
ALBUMIN SERPL ELPH-MCNC: 4.2 G/DL (ref 3.7–5.1)
ALPHA1 GLOB SERPL ELPH-MCNC: 0.4 G/DL (ref 0.2–0.4)
ALPHA2 GLOB SERPL ELPH-MCNC: 0.8 G/DL (ref 0.5–0.9)
B-GLOBULIN SERPL ELPH-MCNC: 0.6 G/DL (ref 0.6–1)
GAMMA GLOB SERPL ELPH-MCNC: 1.1 G/DL (ref 0.7–1.6)
IGA SERPL-MCNC: 8 MG/DL (ref 84–499)
IGG SERPL-MCNC: 1215 MG/DL (ref 610–1616)
IGM SERPL-MCNC: 16 MG/DL (ref 35–242)
KAPPA LC UR-MCNC: 0.53 MG/DL (ref 0.33–1.94)
KAPPA LC/LAMBDA SER: 2.41 {RATIO} (ref 0.26–1.65)
LAMBDA LC SERPL-MCNC: 0.22 MG/DL (ref 0.57–2.63)
M PROTEIN SERPL ELPH-MCNC: 0.7 G/DL
PROT PATTERN SERPL ELPH-IMP: ABNORMAL
PROT PATTERN SERPL IFE-IMP: ABNORMAL

## 2021-06-21 RX ORDER — HEPARIN SODIUM (PORCINE) LOCK FLUSH IV SOLN 100 UNIT/ML 100 UNIT/ML
5 SOLUTION INTRAVENOUS
Status: CANCELLED | OUTPATIENT
Start: 2021-06-29

## 2021-06-21 RX ORDER — MEPERIDINE HYDROCHLORIDE 25 MG/ML
25 INJECTION INTRAMUSCULAR; INTRAVENOUS; SUBCUTANEOUS EVERY 30 MIN PRN
Status: CANCELLED | OUTPATIENT
Start: 2021-07-02

## 2021-06-21 RX ORDER — ALBUTEROL SULFATE 90 UG/1
1-2 AEROSOL, METERED RESPIRATORY (INHALATION)
Status: CANCELLED
Start: 2021-07-02

## 2021-06-21 RX ORDER — SODIUM CHLORIDE 9 MG/ML
1000 INJECTION, SOLUTION INTRAVENOUS CONTINUOUS PRN
Status: CANCELLED
Start: 2021-07-02

## 2021-06-21 RX ORDER — MEPERIDINE HYDROCHLORIDE 25 MG/ML
25 INJECTION INTRAMUSCULAR; INTRAVENOUS; SUBCUTANEOUS EVERY 30 MIN PRN
Status: CANCELLED | OUTPATIENT
Start: 2021-06-22

## 2021-06-21 RX ORDER — MEPERIDINE HYDROCHLORIDE 25 MG/ML
25 INJECTION INTRAMUSCULAR; INTRAVENOUS; SUBCUTANEOUS EVERY 30 MIN PRN
Status: CANCELLED | OUTPATIENT
Start: 2021-06-25

## 2021-06-21 RX ORDER — DIPHENHYDRAMINE HYDROCHLORIDE 50 MG/ML
50 INJECTION INTRAMUSCULAR; INTRAVENOUS
Status: CANCELLED
Start: 2021-06-25

## 2021-06-21 RX ORDER — EPINEPHRINE 1 MG/ML
0.3 INJECTION, SOLUTION, CONCENTRATE INTRAVENOUS EVERY 5 MIN PRN
Status: CANCELLED | OUTPATIENT
Start: 2021-07-02

## 2021-06-21 RX ORDER — NALOXONE HYDROCHLORIDE 0.4 MG/ML
.1-.4 INJECTION, SOLUTION INTRAMUSCULAR; INTRAVENOUS; SUBCUTANEOUS
Status: CANCELLED | OUTPATIENT
Start: 2021-06-22

## 2021-06-21 RX ORDER — MEPERIDINE HYDROCHLORIDE 25 MG/ML
25 INJECTION INTRAMUSCULAR; INTRAVENOUS; SUBCUTANEOUS EVERY 30 MIN PRN
Status: CANCELLED | OUTPATIENT
Start: 2021-06-29

## 2021-06-21 RX ORDER — LORAZEPAM 2 MG/ML
0.5 INJECTION INTRAMUSCULAR EVERY 4 HOURS PRN
Status: CANCELLED | OUTPATIENT
Start: 2021-06-25

## 2021-06-21 RX ORDER — ALBUTEROL SULFATE 90 UG/1
1-2 AEROSOL, METERED RESPIRATORY (INHALATION)
Status: CANCELLED
Start: 2021-06-25

## 2021-06-21 RX ORDER — NALOXONE HYDROCHLORIDE 0.4 MG/ML
.1-.4 INJECTION, SOLUTION INTRAMUSCULAR; INTRAVENOUS; SUBCUTANEOUS
Status: CANCELLED | OUTPATIENT
Start: 2021-07-02

## 2021-06-21 RX ORDER — HEPARIN SODIUM (PORCINE) LOCK FLUSH IV SOLN 100 UNIT/ML 100 UNIT/ML
5 SOLUTION INTRAVENOUS
Status: CANCELLED | OUTPATIENT
Start: 2021-06-22

## 2021-06-21 RX ORDER — ACETAMINOPHEN 325 MG/1
650 TABLET ORAL ONCE
Status: CANCELLED | OUTPATIENT
Start: 2021-06-22

## 2021-06-21 RX ORDER — NALOXONE HYDROCHLORIDE 0.4 MG/ML
.1-.4 INJECTION, SOLUTION INTRAMUSCULAR; INTRAVENOUS; SUBCUTANEOUS
Status: CANCELLED | OUTPATIENT
Start: 2021-06-25

## 2021-06-21 RX ORDER — METHYLPREDNISOLONE SODIUM SUCCINATE 125 MG/2ML
125 INJECTION, POWDER, LYOPHILIZED, FOR SOLUTION INTRAMUSCULAR; INTRAVENOUS
Status: CANCELLED
Start: 2021-07-02

## 2021-06-21 RX ORDER — SODIUM CHLORIDE 9 MG/ML
1000 INJECTION, SOLUTION INTRAVENOUS CONTINUOUS PRN
Status: CANCELLED
Start: 2021-06-22

## 2021-06-21 RX ORDER — SODIUM CHLORIDE 9 MG/ML
1000 INJECTION, SOLUTION INTRAVENOUS CONTINUOUS PRN
Status: CANCELLED
Start: 2021-06-25

## 2021-06-21 RX ORDER — ALBUTEROL SULFATE 90 UG/1
1-2 AEROSOL, METERED RESPIRATORY (INHALATION)
Status: CANCELLED
Start: 2021-06-29

## 2021-06-21 RX ORDER — HEPARIN SODIUM,PORCINE 10 UNIT/ML
5 VIAL (ML) INTRAVENOUS
Status: CANCELLED | OUTPATIENT
Start: 2021-06-29

## 2021-06-21 RX ORDER — ALBUTEROL SULFATE 0.83 MG/ML
2.5 SOLUTION RESPIRATORY (INHALATION)
Status: CANCELLED | OUTPATIENT
Start: 2021-06-29

## 2021-06-21 RX ORDER — EPINEPHRINE 1 MG/ML
0.3 INJECTION, SOLUTION, CONCENTRATE INTRAVENOUS EVERY 5 MIN PRN
Status: CANCELLED | OUTPATIENT
Start: 2021-06-22

## 2021-06-21 RX ORDER — DIPHENHYDRAMINE HYDROCHLORIDE 50 MG/ML
50 INJECTION INTRAMUSCULAR; INTRAVENOUS
Status: CANCELLED
Start: 2021-06-29

## 2021-06-21 RX ORDER — DIPHENHYDRAMINE HYDROCHLORIDE 50 MG/ML
50 INJECTION INTRAMUSCULAR; INTRAVENOUS
Status: CANCELLED
Start: 2021-06-22

## 2021-06-21 RX ORDER — DIPHENHYDRAMINE HCL 50 MG
50 CAPSULE ORAL ONCE
Status: CANCELLED | OUTPATIENT
Start: 2021-06-22

## 2021-06-21 RX ORDER — ALBUTEROL SULFATE 0.83 MG/ML
2.5 SOLUTION RESPIRATORY (INHALATION)
Status: CANCELLED | OUTPATIENT
Start: 2021-06-22

## 2021-06-21 RX ORDER — DIPHENHYDRAMINE HYDROCHLORIDE 50 MG/ML
50 INJECTION INTRAMUSCULAR; INTRAVENOUS
Status: CANCELLED
Start: 2021-07-02

## 2021-06-21 RX ORDER — LORAZEPAM 2 MG/ML
0.5 INJECTION INTRAMUSCULAR EVERY 4 HOURS PRN
Status: CANCELLED | OUTPATIENT
Start: 2021-06-22

## 2021-06-21 RX ORDER — METHYLPREDNISOLONE SODIUM SUCCINATE 125 MG/2ML
125 INJECTION, POWDER, LYOPHILIZED, FOR SOLUTION INTRAMUSCULAR; INTRAVENOUS
Status: CANCELLED
Start: 2021-06-22

## 2021-06-21 RX ORDER — METHYLPREDNISOLONE SODIUM SUCCINATE 125 MG/2ML
125 INJECTION, POWDER, LYOPHILIZED, FOR SOLUTION INTRAMUSCULAR; INTRAVENOUS
Status: CANCELLED
Start: 2021-06-29

## 2021-06-21 RX ORDER — ALBUTEROL SULFATE 90 UG/1
1-2 AEROSOL, METERED RESPIRATORY (INHALATION)
Status: CANCELLED
Start: 2021-06-22

## 2021-06-21 RX ORDER — EPINEPHRINE 1 MG/ML
0.3 INJECTION, SOLUTION, CONCENTRATE INTRAVENOUS EVERY 5 MIN PRN
Status: CANCELLED | OUTPATIENT
Start: 2021-06-29

## 2021-06-21 RX ORDER — SODIUM CHLORIDE 9 MG/ML
1000 INJECTION, SOLUTION INTRAVENOUS CONTINUOUS PRN
Status: CANCELLED
Start: 2021-06-29

## 2021-06-21 RX ORDER — NALOXONE HYDROCHLORIDE 0.4 MG/ML
.1-.4 INJECTION, SOLUTION INTRAMUSCULAR; INTRAVENOUS; SUBCUTANEOUS
Status: CANCELLED | OUTPATIENT
Start: 2021-06-29

## 2021-06-21 RX ORDER — EPINEPHRINE 1 MG/ML
0.3 INJECTION, SOLUTION, CONCENTRATE INTRAVENOUS EVERY 5 MIN PRN
Status: CANCELLED | OUTPATIENT
Start: 2021-06-25

## 2021-06-21 RX ORDER — METHYLPREDNISOLONE SODIUM SUCCINATE 125 MG/2ML
125 INJECTION, POWDER, LYOPHILIZED, FOR SOLUTION INTRAMUSCULAR; INTRAVENOUS
Status: CANCELLED
Start: 2021-06-25

## 2021-06-21 RX ORDER — HEPARIN SODIUM,PORCINE 10 UNIT/ML
5 VIAL (ML) INTRAVENOUS
Status: CANCELLED | OUTPATIENT
Start: 2021-07-02

## 2021-06-21 RX ORDER — HEPARIN SODIUM (PORCINE) LOCK FLUSH IV SOLN 100 UNIT/ML 100 UNIT/ML
5 SOLUTION INTRAVENOUS
Status: CANCELLED | OUTPATIENT
Start: 2021-06-25

## 2021-06-21 RX ORDER — ALBUTEROL SULFATE 0.83 MG/ML
2.5 SOLUTION RESPIRATORY (INHALATION)
Status: CANCELLED | OUTPATIENT
Start: 2021-06-25

## 2021-06-21 RX ORDER — HEPARIN SODIUM,PORCINE 10 UNIT/ML
5 VIAL (ML) INTRAVENOUS
Status: CANCELLED | OUTPATIENT
Start: 2021-06-22

## 2021-06-21 RX ORDER — HEPARIN SODIUM,PORCINE 10 UNIT/ML
5 VIAL (ML) INTRAVENOUS
Status: CANCELLED | OUTPATIENT
Start: 2021-06-25

## 2021-06-21 RX ORDER — LORAZEPAM 2 MG/ML
0.5 INJECTION INTRAMUSCULAR EVERY 4 HOURS PRN
Status: CANCELLED | OUTPATIENT
Start: 2021-06-29

## 2021-06-21 RX ORDER — LORAZEPAM 2 MG/ML
0.5 INJECTION INTRAMUSCULAR EVERY 4 HOURS PRN
Status: CANCELLED | OUTPATIENT
Start: 2021-07-02

## 2021-06-21 RX ORDER — HEPARIN SODIUM (PORCINE) LOCK FLUSH IV SOLN 100 UNIT/ML 100 UNIT/ML
5 SOLUTION INTRAVENOUS
Status: CANCELLED | OUTPATIENT
Start: 2021-07-02

## 2021-06-21 RX ORDER — ALBUTEROL SULFATE 0.83 MG/ML
2.5 SOLUTION RESPIRATORY (INHALATION)
Status: CANCELLED | OUTPATIENT
Start: 2021-07-02

## 2021-06-22 ENCOUNTER — ONCOLOGY VISIT (OUTPATIENT)
Dept: ONCOLOGY | Facility: OTHER | Age: 71
End: 2021-06-22
Attending: NURSE PRACTITIONER
Payer: MEDICARE

## 2021-06-22 ENCOUNTER — INFUSION THERAPY VISIT (OUTPATIENT)
Dept: INFUSION THERAPY | Facility: OTHER | Age: 71
End: 2021-06-22
Attending: INTERNAL MEDICINE
Payer: MEDICARE

## 2021-06-22 VITALS
OXYGEN SATURATION: 96 % | DIASTOLIC BLOOD PRESSURE: 78 MMHG | HEART RATE: 79 BPM | BODY MASS INDEX: 28.2 KG/M2 | HEIGHT: 63 IN | SYSTOLIC BLOOD PRESSURE: 100 MMHG | TEMPERATURE: 97.9 F | RESPIRATION RATE: 20 BRPM | WEIGHT: 159.17 LBS

## 2021-06-22 VITALS
OXYGEN SATURATION: 96 % | BODY MASS INDEX: 28.2 KG/M2 | RESPIRATION RATE: 18 BRPM | WEIGHT: 159.17 LBS | HEART RATE: 79 BPM | TEMPERATURE: 97.9 F | DIASTOLIC BLOOD PRESSURE: 78 MMHG | SYSTOLIC BLOOD PRESSURE: 100 MMHG | HEIGHT: 63 IN

## 2021-06-22 DIAGNOSIS — C90.00 MULTIPLE MYELOMA NOT HAVING ACHIEVED REMISSION (H): Primary | ICD-10-CM

## 2021-06-22 DIAGNOSIS — M89.9 BONE LESION: ICD-10-CM

## 2021-06-22 DIAGNOSIS — C90.00 MULTIPLE MYELOMA NOT HAVING ACHIEVED REMISSION (H): ICD-10-CM

## 2021-06-22 LAB
ALBUMIN SERPL-MCNC: 3.7 G/DL (ref 3.4–5)
ALP SERPL-CCNC: 120 U/L (ref 40–150)
ALT SERPL W P-5'-P-CCNC: 18 U/L (ref 0–50)
ANION GAP SERPL CALCULATED.3IONS-SCNC: 3 MMOL/L (ref 3–14)
AST SERPL W P-5'-P-CCNC: 11 U/L (ref 0–45)
BASOPHILS # BLD AUTO: 0 10E9/L (ref 0–0.2)
BASOPHILS NFR BLD AUTO: 0.5 %
BILIRUB SERPL-MCNC: 0.4 MG/DL (ref 0.2–1.3)
BUN SERPL-MCNC: 18 MG/DL (ref 7–30)
CALCIUM SERPL-MCNC: 9.1 MG/DL (ref 8.5–10.1)
CHLORIDE SERPL-SCNC: 105 MMOL/L (ref 94–109)
CO2 SERPL-SCNC: 28 MMOL/L (ref 20–32)
CREAT SERPL-MCNC: 0.7 MG/DL (ref 0.52–1.04)
DIFFERENTIAL METHOD BLD: NORMAL
EOSINOPHIL # BLD AUTO: 0.2 10E9/L (ref 0–0.7)
EOSINOPHIL NFR BLD AUTO: 2.9 %
ERYTHROCYTE [DISTWIDTH] IN BLOOD BY AUTOMATED COUNT: 14 % (ref 10–15)
GFR SERPL CREATININE-BSD FRML MDRD: 86 ML/MIN/{1.73_M2}
GLUCOSE SERPL-MCNC: 117 MG/DL (ref 70–99)
HCT VFR BLD AUTO: 40.4 % (ref 35–47)
HGB BLD-MCNC: 13.6 G/DL (ref 11.7–15.7)
IMM GRANULOCYTES # BLD: 0.1 10E9/L (ref 0–0.4)
IMM GRANULOCYTES NFR BLD: 1 %
LYMPHOCYTES # BLD AUTO: 1.2 10E9/L (ref 0.8–5.3)
LYMPHOCYTES NFR BLD AUTO: 18.8 %
MCH RBC QN AUTO: 31.5 PG (ref 26.5–33)
MCHC RBC AUTO-ENTMCNC: 33.7 G/DL (ref 31.5–36.5)
MCV RBC AUTO: 94 FL (ref 78–100)
MONOCYTES # BLD AUTO: 0.6 10E9/L (ref 0–1.3)
MONOCYTES NFR BLD AUTO: 10.4 %
NEUTROPHILS # BLD AUTO: 4.1 10E9/L (ref 1.6–8.3)
NEUTROPHILS NFR BLD AUTO: 66.4 %
NRBC # BLD AUTO: 0 10*3/UL
NRBC BLD AUTO-RTO: 0 /100
PLATELET # BLD AUTO: 320 10E9/L (ref 150–450)
POTASSIUM SERPL-SCNC: 4.3 MMOL/L (ref 3.4–5.3)
PROT SERPL-MCNC: 7.3 G/DL (ref 6.8–8.8)
RBC # BLD AUTO: 4.32 10E12/L (ref 3.8–5.2)
SODIUM SERPL-SCNC: 136 MMOL/L (ref 133–144)
WBC # BLD AUTO: 6.2 10E9/L (ref 4–11)

## 2021-06-22 PROCEDURE — 36415 COLL VENOUS BLD VENIPUNCTURE: CPT | Mod: ZL | Performed by: NURSE PRACTITIONER

## 2021-06-22 PROCEDURE — G0463 HOSPITAL OUTPT CLINIC VISIT: HCPCS

## 2021-06-22 PROCEDURE — 99214 OFFICE O/P EST MOD 30 MIN: CPT | Performed by: NURSE PRACTITIONER

## 2021-06-22 PROCEDURE — 96401 CHEMO ANTI-NEOPL SQ/IM: CPT

## 2021-06-22 PROCEDURE — 250N000011 HC RX IP 250 OP 636: Performed by: NURSE PRACTITIONER

## 2021-06-22 PROCEDURE — 96402 CHEMO HORMON ANTINEOPL SQ/IM: CPT

## 2021-06-22 PROCEDURE — 85025 COMPLETE CBC W/AUTO DIFF WBC: CPT | Mod: ZL | Performed by: NURSE PRACTITIONER

## 2021-06-22 PROCEDURE — 250N000013 HC RX MED GY IP 250 OP 250 PS 637: Performed by: NURSE PRACTITIONER

## 2021-06-22 PROCEDURE — 80053 COMPREHEN METABOLIC PANEL: CPT | Mod: ZL | Performed by: NURSE PRACTITIONER

## 2021-06-22 PROCEDURE — G0463 HOSPITAL OUTPT CLINIC VISIT: HCPCS | Mod: 25

## 2021-06-22 RX ORDER — DIPHENHYDRAMINE HCL 50 MG
50 CAPSULE ORAL ONCE
Status: COMPLETED | OUTPATIENT
Start: 2021-06-22 | End: 2021-06-22

## 2021-06-22 RX ORDER — ACETAMINOPHEN 325 MG/1
650 TABLET ORAL ONCE
Status: COMPLETED | OUTPATIENT
Start: 2021-06-22 | End: 2021-06-22

## 2021-06-22 RX ADMIN — ACETAMINOPHEN 650 MG: 325 TABLET, FILM COATED ORAL at 11:49

## 2021-06-22 RX ADMIN — BORTEZOMIB 2.4 MG: 3.5 INJECTION, POWDER, LYOPHILIZED, FOR SOLUTION INTRAVENOUS; SUBCUTANEOUS at 12:21

## 2021-06-22 RX ADMIN — DARATUMUMAB AND HYALURONIDASE-FIHJ (HUMAN RECOMBINANT) 1800 MG: 1800; 30000 INJECTION SUBCUTANEOUS at 12:29

## 2021-06-22 RX ADMIN — DIPHENHYDRAMINE HYDROCHLORIDE 50 MG: 50 CAPSULE ORAL at 11:49

## 2021-06-22 ASSESSMENT — MIFFLIN-ST. JEOR
SCORE: 1201
SCORE: 1251.3
SCORE: 1201.13

## 2021-06-22 ASSESSMENT — PAIN SCALES - GENERAL
PAINLEVEL: NO PAIN (0)
PAINLEVEL: NO PAIN (0)

## 2021-06-22 NOTE — PATIENT INSTRUCTIONS
We would like to see you back per your schedule.     When you are in need of a refill, please call your pharmacy and they will send us a request.     If you have any questions please call [phone]    Other instructions:  none

## 2021-06-22 NOTE — PROGRESS NOTES
Oncology Follow-up Visit:  June 22, 2021    Reason for Visit:  Patient presents with:  RECHECK: Follow up Multiple myeloma      Nursing Note and documentation reviewed: yes    HPI:   This is a 70-year-old female patient who presents to the oncology clinic today for evaluation prior to receiving cycle 9 day 1 therapy for Stage II-RISS IgG multiple myeloma diagnosed June 2019.  She progressed on Velcade and dexamethasone and CyBorD and treated was changed to Darzalex faspro injection. M-spike went from 1.0 to 1.2 in March so Velcade and dex were added to treatment plan.     She presents to the clinic today stating overall she is doing pretty good but she does admit to some increased fatigue. She states she will have days where she feels more fatigued than others and when she does lie down and rest and sleep, this does help. She continues on calcium with vitamin D twice a day and does continue with her dental visits every 6 months as she has had no issues with her teeth. She is taking her 20 mg of dexamethasone weekly.    Oncologic History:     12/31/2018   patient presented to the emergency room with vertigo and fatigue.  CT scan of the head was negative and subsequent stress test was negative.  5/3/2019  She was seen by her PCP who ordered lab work and noted a total protein of 12.9.  SPEP at that time showed an M spike of 6.2 with a large monoclonal protein seen in the gamma fraction.  Urine immunofixation showed a possible small protein band in the gamma fraction  5/31/2019  she was evaluated by Dr. Wlaker with Medical Oncology with plan to rule out myeloma and obtain bone marrow aspiration biopsy as well as a metastatic bone survey along with additional labwork  6/18/2019 she underwent bone marrow aspiration and biopsy  6/24/2019  She was seen again by Dr. Walker and CBC showed a hemoglobin of 9.3, M spike 7.3 with monoclonal IgG immunoglobulin of kappa light chain type; serum viscosity was 2.9; quantitative  immunoglobulins showed an IgG of 8160, beta-2 microglobulin was 5.8, BUN was 21 with creatinine is 0.8 and total protein was 13.7.  Quantitative kappa/lambda free light chains showed an elevated ratio of 17.0 bone marrow aspiration biopsy showed plasma cell myeloma with approximately 80% plasma cells.  Immunofixation showed IgG kappa and flow cytometry revealed kappa monotypic plasma cells consistent with clonal plasma cell neoplasm and FISH panel was pending at that time.  It was felt she had at least stage II disease based on her beta-2 microglobulin and anemia.  Plan was to treat with Velcade 1.3 mg per metered squared days 1, 4, 8 and 11/Decadron 40 mg on days 1, 8 and 15 initiation of Revlimid with the second cycle at 25 mg daily days 1 through 14.  Plan was to also obtain an MRI of the lumbar spine to rule out lytic lesion at L3.  She was initiated on Zovirax 400 mg p.o. twice daily.  6/25/2019  1st cycle of chemotherapy initiated  7/1/2019 note in chart regarding patient's large co-pay for the Revlimid and no plan at this point to initiate Revlimid and treat only with Velcade and Decadron per Dr. Walker  7/11/2019  MRI lumbar spine shows a pathologic superior endplate compression fracture at L3 without evidence of retropulsed fragment and innumerable enhancing lesions throughout the lumbar spine consistent with history of multiple myeloma.  9/10/2019  Increasing M-spike and kappa lambda ratio  10/1/2019  Initiation of CyBorD  11/17/2021  Stabilization of M-spike at 1.2  12/1/2020  Initiation of Darzalex Faspro injection  3/23/2021  M-spike increased form 1.0 to 1.2 and velcade and dexamethasone added to plan     Current Chemo Regime/TX:  Darzalex faspro injection 1800mg subcutaneous per protocol with velcade 1.3mg/m2 and dexamethasone 20mg added to day 4, 8, 11 with cycle given every 21 days      **Zometa 4mg every 3 months  Current Cycle: 9 day 1 (cycle 5 added velcade dex)  # of completed  "cycles:  8     Previous treatment:   Velcade 1.3 mg/m2 days 1, 4, 8 and 11 with Decadron 40 mg days 1, 8 and 15 x 4 cycles;   Velcade 1.5mg.m2/cyclophosphamide 150mg every 7 days on days 1,8,15 and 22/decadron 40mg days 1,8,15,22 ; Darzalex faspro injection 1800mg subcutaneous per protocol     Past Medical History:   Diagnosis Date     Arthritis      Depressive disorder      Diabetes mellitus, type 2 (H) 1/18/2021     Essential hypertension 10/1/2015     Major depressive disorder, recurrent episode, mild (H) 10/1/2015     Mixed hyperlipidemia 10/1/2015     Multiple myeloma not having achieved remission (H) 6/24/2019     Other specified disorders of bladder 07/09/2012    Irritable Bladder     Seasonal allergies 10/1/2015     Unspecified essential hypertension 03/19/2007     Unspecified sinusitis (chronic) 09/05/2007       Past Surgical History:   Procedure Laterality Date     APPENDECTOMY       BONE MARROW BIOPSY, BONE SPECIMEN, NEEDLE/TROCAR N/A 6/18/2019    Procedure: BONE MARROW BIOPSY;  Surgeon: Maciej Sanz MD;  Location: [facility]     CHOLECYSTECTOMY       COLONOSCOPY  07-    repeat 10 years     COLONOSCOPY N/A 12/30/2016    Procedure: COLONOSCOPY;  Surgeon: Bhaskar Franklin DO;  Location: [facility]     SINUS SURGERY       TUBAL STERILIZATION         Family History   Problem Relation Age of Onset     Breast Cancer Mother 66        Cause of Death     Parkinsonism Father         \"Possible\"     Coronary Artery Disease Father      Pacemaker Father      Thyroid Disease Daughter      Diabetes No family hx of      Hypertension No family hx of      Hyperlipidemia No family hx of      Cerebrovascular Disease No family hx of      Colon Cancer No family hx of      Prostate Cancer No family hx of      Genetic Disorder No family hx of      Asthma No family hx of      Anesthesia Reaction No family hx of        Social History     Socioeconomic History     Marital status:      Spouse name: Not on file     Number " of children: Not on file     Years of education: Not on file     Highest education level: Not on file   Occupational History     Occupation: Financial     Comment:  - ([employer])   Social Needs     Financial resource strain: Not on file     Food insecurity     Worry: Not on file     Inability: Not on file     Transportation needs     Medical: Not on file     Non-medical: Not on file   Tobacco Use     Smoking status: Never Smoker     Smokeless tobacco: Never Used     Tobacco comment: Tried to Quit (YES); QUIT in 1971; Passive Exposure (NO)   Substance and Sexual Activity     Alcohol use: Yes     Comment: RARELY     Drug use: No     Sexual activity: Yes     Partners: Male     Birth control/protection: None   Lifestyle     Physical activity     Days per week: Not on file     Minutes per session: Not on file     Stress: Not on file   Relationships     Social connections     Talks on phone: Not on file     Gets together: Not on file     Attends Voodoo service: Not on file     Active member of club or organization: Not on file     Attends meetings of clubs or organizations: Not on file     Relationship status: Not on file     Intimate partner violence     Fear of current or ex partner: Not on file     Emotionally abused: Not on file     Physically abused: Not on file     Forced sexual activity: Not on file   Other Topics Concern      Service Not Asked     Blood Transfusions Not Asked     Caffeine Concern Yes     Comment: Coffee >6 cups daily     Occupational Exposure Not Asked     Hobby Hazards Not Asked     Sleep Concern Not Asked     Stress Concern Not Asked     Weight Concern Not Asked     Special Diet Not Asked     Back Care Not Asked     Exercise Not Asked     Bike Helmet Not Asked     Seat Belt Not Asked     Self-Exams Not Asked     Parent/sibling w/ CABG, MI or angioplasty before 65F 55M? No   Social History Narrative     Not on file       Current Outpatient Medications   Medication     acyclovir  "(ZOVIRAX) 400 MG tablet     aspirin (ASA) 81 MG chewable tablet     atorvastatin (LIPITOR) 10 MG tablet     dexamethasone (DECADRON) 4 MG tablet     fluticasone (FLONASE) 50 MCG/ACT nasal spray     losartan (COZAAR) 50 MG tablet     sertraline (ZOLOFT) 50 MG tablet     dexamethasone (DECADRON) 4 MG tablet     EPINEPHrine (EPIPEN) 0.3 MG/0.3ML injection     prochlorperazine (COMPAZINE) 10 MG tablet     No current facility-administered medications for this visit.         Allergies   Allergen Reactions     Lisinopril Cough     Phenylephrine Hcl Other (See Comments)     **Entex  HEADACHE (SEVERE)     Phenylpropanolamine Other (See Comments)     **Entex  HEADACHE (SEVERE)     Pseudoephedrine Tannate Other (See Comments)     **Entex  HEADACHE (SEVERE)     Levofloxacin Rash     **Levaquin     Moxifloxacin Hcl [Avelox] Rash       Review Of Systems:  Constitutional:    denies fever, weight changes, chills, and night sweats.  Eyes:    denies blurred or double vision  Ears/Nose/Throat:   denies ear pain, nose problems, difficulty swallowing  Respiratory:   denies shortness of breath, cough  Skin:   denies rash, lesions  Cardiovascular:   denies chest pain, palpitations, edema  Gastrointestinal:   denies abdominal pain, bloating, nausea, early satiety; no change in bowel habits or blood in stool  Genitourinary:   denies difficulty with urination, blood in urine  Musculoskeletal:    denies new muscle pain, bone pain  Neurologic:   denies headaches, numbness or tingling; has occasional dizziness  Psychiatric:   denies anxiety, depression  Hematologic/Lymphatic/Immunologic:   denies easy bruising, easy bleeding, lumps or bumps noted  Endocrine:   Denies increased thirst      ECOG Performance Status: 1    Physical Exam:  /78   Pulse 79   Temp 97.9  F (36.6  C) (Tympanic)   Resp 20   Ht 1.6 m (5' 3\")   Wt 72.2 kg (159 lb 2.8 oz)   SpO2 96%   BMI 28.20 kg/m      GENERAL APPEARANCE: Healthy, alert and in no acute " distress.  HEENT: Normocephalic, Sclerae anicteric. No oral lesions or thrush  NECK:   No asymmetry or masses, no thyromegaly.  LYMPHATICS: No palpable cervical, supraclavicular, axillary, or inguinal nodes   RESP: Lungs clear to auscultation bilaterally, respirations regular and easy  CARDIOVASCULAR: Regular rate and rhythm. Normal S1, S2; no murmur, gallop, or rub.  ABDOMEN: Soft, nontender. Bowel sounds auscultated all 4 quadrants. No palpable organomegaly or masses.  MUSCULOSKELETAL: Extremities without gross deformities noted. No edema of bilateral lower extremities.  NEURO: Alert and oriented x 3.  Gait steady.  PSYCHIATRIC: Mentation and affect appear normal.  Mood appropriate.    Laboratory:    Component      Latest Ref Rng & Units 6/15/2021   WBC      4.0 - 11.0 10e9/L 7.1   RBC Count      3.8 - 5.2 10e12/L 4.32   Hemoglobin      11.7 - 15.7 g/dL 13.5   Hematocrit      35.0 - 47.0 % 39.7   MCV      78 - 100 fl 92   MCH      26.5 - 33.0 pg 31.3   MCHC      31.5 - 36.5 g/dL 34.0   RDW      10.0 - 15.0 % 14.3   Platelet Count      150 - 450 10e9/L 143 (L)   Diff Method       Automated Method   % Neutrophils      % 54.5   % Lymphocytes      % 21.7   % Monocytes      % 15.8   % Eosinophils      % 2.9   % Basophils      % 0.6   % Immature Granulocytes      % 4.5   Nucleated RBCs      0 /100 1 (H)   Absolute Neutrophil      1.6 - 8.3 10e9/L 3.9   Absolute Lymphocytes      0.8 - 5.3 10e9/L 1.6   Absolute Monocytes      0.0 - 1.3 10e9/L 1.1   Absolute Eosinophils      0.0 - 0.7 10e9/L 0.2   Absolute Basophils      0.0 - 0.2 10e9/L 0.0   Abs Immature Granulocytes      0 - 0.4 10e9/L 0.3   Absolute Nucleated RBC       0.0   Albumin Fraction      3.7 - 5.1 g/dL 4.2   Alpha 1 Fraction      0.2 - 0.4 g/dL 0.4   Alpha 2 Fraction      0.5 - 0.9 g/dL 0.8   Beta Fraction      0.6 - 1.0 g/dL 0.6   Gamma Fraction      0.7 - 1.6 g/dL 1.1   Monoclonal Peak      0.0 g/dL 0.7 (H)   ELP Interpretation:       Monoclonal protein (0.7  g/dL) seen in the gamma fraction. See immunofixation report on . . .   Immunofixation ELP       (Note)   IGG      610 - 1,616 mg/dL 1,215   IGA      84 - 499 mg/dL 8 (L)   IGM      35 - 242 mg/dL 16 (L)   [place] Free Lt Chain      0.33 - 1.94 mg/dL 0.53   Lambda Free Lt Chain      0.57 - 2.63 mg/dL 0.22 (L)   Kappa Lambda Ratio      0.26 - 1.65 2.41 (H)       Imaging Studies:  None completed for today's visit      ASSESSMENT/PLAN:    #1 Multiple myeloma:   IgG kappa multiple myeloma with 80% involvement of the bone marrow diagnosed June 2019 initially treated with Velcade and Decadron and received 4 cycles with increasing M-spike and kappa/lambda ratio.  Treatment changed to CyBorD on 10/1/2019.  M-spike plateau at 1.2 and treatment changed to monotherapy with Darzalex Faspro injection.     M spike declined to 1.0 then increased again to 1.2 so Velcade and Dex added to her treatment plan with cycle 5 therapy.  M-spike down to 0.7.  Will initiate cycle 9 and patient will follow up prior to cycle 10 with a CBC, CMP, SPEP, protein immunofixation and kappa lambda ratio.     #2 lytic lesion: She will continue with the Zometa every 3 months. She remains on calcium with vitamin D twice a day and dental exams at least every 6 months.      I encouraged patient to call with any questions or concerns.    Barbie Quintana, NP  APRN, FNP-BC, AOCNP

## 2021-06-22 NOTE — PROGRESS NOTES
Patient is 70 years old, here today for injection of Velcade per order of . Patient meets parameters for today's infusion. Patient identified with two identifiers, order verified, and verbal consent for today's infusion obtained from patient. Written consent for treatment is on file and valid.    Today's lab values: WBC: 6.2, HGB: 13.6, PLT: 320  ANC: 4.1, AST:  11,  ALT:   18,  Alk Phos:  120, Creatinine:  0.70 .     Patient meets order parameters for today's treatment.    Patient denies questions or concerns regarding injection and/or medication(s) being administered.    Independent dose check completed with UZMA Rodríguez.    Velcade injected SQ into right upper outer arm 45 degree angle per protocol rotating sites. Patient tolerated injection without incident, no signs or symptoms of adverse reaction noted. Patient denies pain or discomfort.     FASPRO injected SQ into right abdomen, 3 inches from umbilicus per orders, at a 45 degree angle per protocol rotating sites. Patient tolerated injection without incident, no signs or symptoms of adverse reaction noted. Patient denies pain or discomfort.     Covered with a sterile bandage. Pt instructed to leave bandage intact for a minimum of one hour, and to call with questions or concerns. Patient states understanding, discharged.

## 2021-06-22 NOTE — NURSING NOTE
"Chief Complaint   Patient presents with     RECHECK     Follow up Multiple myeloma        Initial /78   Pulse 79   Temp 97.9  F (36.6  C) (Tympanic)   Resp 20   Ht 1.6 m (5' 3\")   Wt 72.2 kg (159 lb 2.8 oz)   SpO2 96%   BMI 28.20 kg/m   Estimated body mass index is 28.2 kg/m  as calculated from the following:    Height as of this encounter: 1.6 m (5' 3\").    Weight as of this encounter: 72.2 kg (159 lb 2.8 oz).  Medication Reconciliation: complete.  Immunizations and advance directives status reviewed. Pain scale =0 , PHQ-2=0.            Vlima Chi LPN    "

## 2021-06-25 ENCOUNTER — INFUSION THERAPY VISIT (OUTPATIENT)
Dept: INFUSION THERAPY | Facility: OTHER | Age: 71
End: 2021-06-25
Attending: INTERNAL MEDICINE
Payer: MEDICARE

## 2021-06-25 VITALS
HEIGHT: 63 IN | WEIGHT: 165.34 LBS | OXYGEN SATURATION: 98 % | TEMPERATURE: 98.4 F | HEART RATE: 94 BPM | DIASTOLIC BLOOD PRESSURE: 76 MMHG | RESPIRATION RATE: 16 BRPM | SYSTOLIC BLOOD PRESSURE: 125 MMHG | BODY MASS INDEX: 29.3 KG/M2

## 2021-06-25 DIAGNOSIS — C90.00 MULTIPLE MYELOMA NOT HAVING ACHIEVED REMISSION (H): Primary | ICD-10-CM

## 2021-06-25 PROCEDURE — 250N000011 HC RX IP 250 OP 636: Performed by: NURSE PRACTITIONER

## 2021-06-25 PROCEDURE — 96401 CHEMO ANTI-NEOPL SQ/IM: CPT

## 2021-06-25 RX ADMIN — BORTEZOMIB 2.4 MG: 3.5 INJECTION, POWDER, LYOPHILIZED, FOR SOLUTION INTRAVENOUS; SUBCUTANEOUS at 13:20

## 2021-06-25 ASSESSMENT — MIFFLIN-ST. JEOR: SCORE: 1229.13

## 2021-06-25 ASSESSMENT — PAIN SCALES - GENERAL: PAINLEVEL: NO PAIN (0)

## 2021-06-25 NOTE — PROGRESS NOTES
Patient is a 70 year old female here today for injection of Velcade per order of . Patient meets parameters for today's infusion. Patient identified with two identifiers, order verified, and verbal consent for today's infusion obtained from patient. Written consent for treatment is on file and valid.    Patient denies questions or concerns regarding injection and/or medication(s) being administered.  Velcade injected SQ into left upper outer arm  per protocol rotating sites.     Injection administered per protocol. Patient tolerated infusion without incident, no signs or symptoms of adverse reaction noted. Patient denies pain or discomfort.     Covered with a sterile bandage. Pt instructed to leave bandage intact for a minimum of one hour, and to call with questions or concerns. Copy of appointments, discharge instructions, and after visit summary (AVS) provided to patient. Patient states understanding, discharged ambulatory.

## 2021-06-29 ENCOUNTER — INFUSION THERAPY VISIT (OUTPATIENT)
Dept: INFUSION THERAPY | Facility: OTHER | Age: 71
End: 2021-06-29
Attending: INTERNAL MEDICINE
Payer: MEDICARE

## 2021-06-29 VITALS
OXYGEN SATURATION: 98 % | WEIGHT: 165.12 LBS | SYSTOLIC BLOOD PRESSURE: 109 MMHG | TEMPERATURE: 98.9 F | HEIGHT: 63 IN | HEART RATE: 90 BPM | DIASTOLIC BLOOD PRESSURE: 73 MMHG | RESPIRATION RATE: 16 BRPM | BODY MASS INDEX: 29.26 KG/M2

## 2021-06-29 DIAGNOSIS — C90.00 MULTIPLE MYELOMA NOT HAVING ACHIEVED REMISSION (H): Primary | ICD-10-CM

## 2021-06-29 LAB
BASOPHILS # BLD AUTO: 0 10E9/L (ref 0–0.2)
BASOPHILS NFR BLD AUTO: 0.4 %
DIFFERENTIAL METHOD BLD: ABNORMAL
EOSINOPHIL # BLD AUTO: 0.2 10E9/L (ref 0–0.7)
EOSINOPHIL NFR BLD AUTO: 4.2 %
ERYTHROCYTE [DISTWIDTH] IN BLOOD BY AUTOMATED COUNT: 14.1 % (ref 10–15)
HCT VFR BLD AUTO: 40.7 % (ref 35–47)
HGB BLD-MCNC: 13.9 G/DL (ref 11.7–15.7)
IMM GRANULOCYTES # BLD: 0.1 10E9/L (ref 0–0.4)
IMM GRANULOCYTES NFR BLD: 1.1 %
LYMPHOCYTES # BLD AUTO: 1.5 10E9/L (ref 0.8–5.3)
LYMPHOCYTES NFR BLD AUTO: 26.4 %
MCH RBC QN AUTO: 31.2 PG (ref 26.5–33)
MCHC RBC AUTO-ENTMCNC: 34.2 G/DL (ref 31.5–36.5)
MCV RBC AUTO: 91 FL (ref 78–100)
MONOCYTES # BLD AUTO: 1.1 10E9/L (ref 0–1.3)
MONOCYTES NFR BLD AUTO: 20 %
NEUTROPHILS # BLD AUTO: 2.7 10E9/L (ref 1.6–8.3)
NEUTROPHILS NFR BLD AUTO: 47.9 %
NRBC # BLD AUTO: 0 10*3/UL
NRBC BLD AUTO-RTO: 1 /100
PLATELET # BLD AUTO: 260 10E9/L (ref 150–450)
RBC # BLD AUTO: 4.46 10E12/L (ref 3.8–5.2)
WBC # BLD AUTO: 5.7 10E9/L (ref 4–11)

## 2021-06-29 PROCEDURE — 85025 COMPLETE CBC W/AUTO DIFF WBC: CPT | Mod: ZL | Performed by: NURSE PRACTITIONER

## 2021-06-29 PROCEDURE — 96401 CHEMO ANTI-NEOPL SQ/IM: CPT

## 2021-06-29 PROCEDURE — 250N000011 HC RX IP 250 OP 636: Mod: JW | Performed by: NURSE PRACTITIONER

## 2021-06-29 PROCEDURE — 36415 COLL VENOUS BLD VENIPUNCTURE: CPT | Mod: ZL | Performed by: NURSE PRACTITIONER

## 2021-06-29 RX ORDER — HEPARIN SODIUM (PORCINE) LOCK FLUSH IV SOLN 100 UNIT/ML 100 UNIT/ML
5 SOLUTION INTRAVENOUS
Status: CANCELLED | OUTPATIENT
Start: 2021-07-02

## 2021-06-29 RX ORDER — METHYLPREDNISOLONE SODIUM SUCCINATE 125 MG/2ML
125 INJECTION, POWDER, LYOPHILIZED, FOR SOLUTION INTRAMUSCULAR; INTRAVENOUS
Status: CANCELLED
Start: 2021-07-02

## 2021-06-29 RX ORDER — MEPERIDINE HYDROCHLORIDE 25 MG/ML
25 INJECTION INTRAMUSCULAR; INTRAVENOUS; SUBCUTANEOUS EVERY 30 MIN PRN
Status: CANCELLED | OUTPATIENT
Start: 2021-07-02

## 2021-06-29 RX ORDER — ALBUTEROL SULFATE 0.83 MG/ML
2.5 SOLUTION RESPIRATORY (INHALATION)
Status: CANCELLED | OUTPATIENT
Start: 2021-07-02

## 2021-06-29 RX ORDER — LORAZEPAM 2 MG/ML
0.5 INJECTION INTRAMUSCULAR EVERY 4 HOURS PRN
Status: CANCELLED | OUTPATIENT
Start: 2021-07-02

## 2021-06-29 RX ORDER — NALOXONE HYDROCHLORIDE 0.4 MG/ML
.1-.4 INJECTION, SOLUTION INTRAMUSCULAR; INTRAVENOUS; SUBCUTANEOUS
Status: CANCELLED | OUTPATIENT
Start: 2021-07-02

## 2021-06-29 RX ORDER — ALBUTEROL SULFATE 90 UG/1
1-2 AEROSOL, METERED RESPIRATORY (INHALATION)
Status: CANCELLED
Start: 2021-07-02

## 2021-06-29 RX ORDER — HEPARIN SODIUM,PORCINE 10 UNIT/ML
5 VIAL (ML) INTRAVENOUS
Status: CANCELLED | OUTPATIENT
Start: 2021-07-02

## 2021-06-29 RX ORDER — DIPHENHYDRAMINE HYDROCHLORIDE 50 MG/ML
50 INJECTION INTRAMUSCULAR; INTRAVENOUS
Status: CANCELLED
Start: 2021-07-02

## 2021-06-29 RX ORDER — SODIUM CHLORIDE 9 MG/ML
1000 INJECTION, SOLUTION INTRAVENOUS CONTINUOUS PRN
Status: CANCELLED
Start: 2021-07-02

## 2021-06-29 RX ORDER — EPINEPHRINE 1 MG/ML
0.3 INJECTION, SOLUTION, CONCENTRATE INTRAVENOUS EVERY 5 MIN PRN
Status: CANCELLED | OUTPATIENT
Start: 2021-07-02

## 2021-06-29 RX ADMIN — BORTEZOMIB 2.4 MG: 3.5 INJECTION, POWDER, LYOPHILIZED, FOR SOLUTION INTRAVENOUS; SUBCUTANEOUS at 13:22

## 2021-06-29 ASSESSMENT — MIFFLIN-ST. JEOR: SCORE: 1228

## 2021-06-29 NOTE — PROGRESS NOTES
Patient is a 70 year old here today for injection of Velcade under the orders of Dr. Walker.   Today's lab values: WBC 5.7, ANC 2.7, , HGB 13.9    Velcade dose verified with MARKO Quintanilla RN prior to release of drug.    Patient meets parameters for today's infusion. Denies questions or concerns regarding today's infusion and/or medications being administered.      Patient identified with two identifiers, order verified, and verbal consent for today's infusion obtained from patient.    1322 Velcade injected SQ into right upper outer arm at 45 degree angle per rotating sites. Site covered with sterile bandage. Patient tolerated injection without incident, no signs or symptoms of adverse reaction noted. Patient denies pain or discomfort.    Patient instructed to call with further questions or concerns.  Patient states understanding and is in agreement with this plan. Patient discharged.

## 2021-06-29 NOTE — PATIENT INSTRUCTIONS

## 2021-07-02 ENCOUNTER — INFUSION THERAPY VISIT (OUTPATIENT)
Dept: INFUSION THERAPY | Facility: OTHER | Age: 71
End: 2021-07-02
Attending: INTERNAL MEDICINE
Payer: MEDICARE

## 2021-07-02 VITALS
DIASTOLIC BLOOD PRESSURE: 73 MMHG | RESPIRATION RATE: 16 BRPM | HEIGHT: 63 IN | SYSTOLIC BLOOD PRESSURE: 106 MMHG | BODY MASS INDEX: 29.22 KG/M2 | TEMPERATURE: 98.8 F | OXYGEN SATURATION: 97 % | WEIGHT: 164.9 LBS | HEART RATE: 84 BPM

## 2021-07-02 DIAGNOSIS — C90.00 MULTIPLE MYELOMA NOT HAVING ACHIEVED REMISSION (H): Primary | ICD-10-CM

## 2021-07-02 LAB
BASOPHILS # BLD AUTO: 0 10E9/L (ref 0–0.2)
BASOPHILS NFR BLD AUTO: 0.5 %
DIFFERENTIAL METHOD BLD: NORMAL
EOSINOPHIL # BLD AUTO: 0.2 10E9/L (ref 0–0.7)
EOSINOPHIL NFR BLD AUTO: 2.9 %
ERYTHROCYTE [DISTWIDTH] IN BLOOD BY AUTOMATED COUNT: 14 % (ref 10–15)
HCT VFR BLD AUTO: 39.6 % (ref 35–47)
HGB BLD-MCNC: 13.6 G/DL (ref 11.7–15.7)
IMM GRANULOCYTES # BLD: 0.3 10E9/L (ref 0–0.4)
IMM GRANULOCYTES NFR BLD: 4.5 %
LYMPHOCYTES # BLD AUTO: 1.6 10E9/L (ref 0.8–5.3)
LYMPHOCYTES NFR BLD AUTO: 29.1 %
MCH RBC QN AUTO: 31.6 PG (ref 26.5–33)
MCHC RBC AUTO-ENTMCNC: 34.3 G/DL (ref 31.5–36.5)
MCV RBC AUTO: 92 FL (ref 78–100)
MONOCYTES # BLD AUTO: 0.9 10E9/L (ref 0–1.3)
MONOCYTES NFR BLD AUTO: 16.5 %
NEUTROPHILS # BLD AUTO: 2.6 10E9/L (ref 1.6–8.3)
NEUTROPHILS NFR BLD AUTO: 46.5 %
NRBC # BLD AUTO: 0 10*3/UL
NRBC BLD AUTO-RTO: 0 /100
PLATELET # BLD AUTO: 175 10E9/L (ref 150–450)
RBC # BLD AUTO: 4.31 10E12/L (ref 3.8–5.2)
WBC # BLD AUTO: 5.5 10E9/L (ref 4–11)

## 2021-07-02 PROCEDURE — 36415 COLL VENOUS BLD VENIPUNCTURE: CPT | Mod: ZL | Performed by: NURSE PRACTITIONER

## 2021-07-02 PROCEDURE — 85025 COMPLETE CBC W/AUTO DIFF WBC: CPT | Mod: ZL | Performed by: NURSE PRACTITIONER

## 2021-07-02 PROCEDURE — 96401 CHEMO ANTI-NEOPL SQ/IM: CPT

## 2021-07-02 PROCEDURE — 250N000011 HC RX IP 250 OP 636: Performed by: NURSE PRACTITIONER

## 2021-07-02 RX ADMIN — BORTEZOMIB 2.4 MG: 3.5 INJECTION, POWDER, LYOPHILIZED, FOR SOLUTION INTRAVENOUS; SUBCUTANEOUS at 13:22

## 2021-07-02 ASSESSMENT — MIFFLIN-ST. JEOR: SCORE: 1227

## 2021-07-02 ASSESSMENT — PAIN SCALES - GENERAL: PAINLEVEL: NO PAIN (0)

## 2021-07-02 NOTE — PROGRESS NOTES
Patient is 70 years old, here today for injection of Velcade per order of . Patient meets parameters for today's infusion. Patient identified with two identifiers, order verified, and verbal consent for today's infusion obtained from patient. Written consent for treatment is on file and valid.    Today's lab values: WBC: 5.5, HGB: 13.6, PLT: 175  ANC: 2.6.     Patient meets order parameters for today's treatment.    Patient denies questions or concerns regarding injection and/or medication(s) being administered.    Independent dose check completed with UZMA Mart.    Velcade injected SQ into left upper outer arm at a 45 degree angle per protocol rotating sites. Patient tolerated injection without incident, no signs or symptoms of adverse reaction noted. Patient denies pain or discomfort.     Covered with a sterile bandage. Pt instructed to leave bandage intact for a minimum of one hour, and to call with questions or concerns. Patient states understanding, discharged.

## 2021-07-06 ENCOUNTER — APPOINTMENT (OUTPATIENT)
Dept: LAB | Facility: OTHER | Age: 71
End: 2021-07-06
Attending: INTERNAL MEDICINE
Payer: MEDICARE

## 2021-07-06 DIAGNOSIS — C90.00 MULTIPLE MYELOMA NOT HAVING ACHIEVED REMISSION (H): Primary | ICD-10-CM

## 2021-07-06 LAB
ALBUMIN SERPL-MCNC: 3.5 G/DL (ref 3.4–5)
ALP SERPL-CCNC: 126 U/L (ref 40–150)
ALT SERPL W P-5'-P-CCNC: 17 U/L (ref 0–50)
ANION GAP SERPL CALCULATED.3IONS-SCNC: 4 MMOL/L (ref 3–14)
AST SERPL W P-5'-P-CCNC: 15 U/L (ref 0–45)
BASOPHILS # BLD AUTO: 0 10E9/L (ref 0–0.2)
BASOPHILS NFR BLD AUTO: 0.2 %
BILIRUB SERPL-MCNC: 0.3 MG/DL (ref 0.2–1.3)
BUN SERPL-MCNC: 18 MG/DL (ref 7–30)
CALCIUM SERPL-MCNC: 8.6 MG/DL (ref 8.5–10.1)
CHLORIDE SERPL-SCNC: 106 MMOL/L (ref 94–109)
CO2 SERPL-SCNC: 28 MMOL/L (ref 20–32)
CREAT SERPL-MCNC: 0.76 MG/DL (ref 0.52–1.04)
DIFFERENTIAL METHOD BLD: ABNORMAL
EOSINOPHIL # BLD AUTO: 0.2 10E9/L (ref 0–0.7)
EOSINOPHIL NFR BLD AUTO: 3.2 %
ERYTHROCYTE [DISTWIDTH] IN BLOOD BY AUTOMATED COUNT: 14.3 % (ref 10–15)
GFR SERPL CREATININE-BSD FRML MDRD: 78 ML/MIN/{1.73_M2}
GLUCOSE SERPL-MCNC: 96 MG/DL (ref 70–99)
HCT VFR BLD AUTO: 37.4 % (ref 35–47)
HGB BLD-MCNC: 12.8 G/DL (ref 11.7–15.7)
IMM GRANULOCYTES # BLD: 0.2 10E9/L (ref 0–0.4)
IMM GRANULOCYTES NFR BLD: 4.1 %
LYMPHOCYTES # BLD AUTO: 1.3 10E9/L (ref 0.8–5.3)
LYMPHOCYTES NFR BLD AUTO: 22.5 %
MCH RBC QN AUTO: 31.4 PG (ref 26.5–33)
MCHC RBC AUTO-ENTMCNC: 34.2 G/DL (ref 31.5–36.5)
MCV RBC AUTO: 92 FL (ref 78–100)
MONOCYTES # BLD AUTO: 0.9 10E9/L (ref 0–1.3)
MONOCYTES NFR BLD AUTO: 15.2 %
NEUTROPHILS # BLD AUTO: 3.1 10E9/L (ref 1.6–8.3)
NEUTROPHILS NFR BLD AUTO: 54.8 %
NRBC # BLD AUTO: 0.1 10*3/UL
NRBC BLD AUTO-RTO: 1 /100
PLATELET # BLD AUTO: 120 10E9/L (ref 150–450)
PLATELET # BLD EST: ABNORMAL 10*3/UL
POTASSIUM SERPL-SCNC: 4.2 MMOL/L (ref 3.4–5.3)
PROT SERPL-MCNC: 7 G/DL (ref 6.8–8.8)
RBC # BLD AUTO: 4.07 10E12/L (ref 3.8–5.2)
RBC MORPH BLD: ABNORMAL
SODIUM SERPL-SCNC: 138 MMOL/L (ref 133–144)
WBC # BLD AUTO: 5.7 10E9/L (ref 4–11)

## 2021-07-06 PROCEDURE — 80053 COMPREHEN METABOLIC PANEL: CPT | Performed by: NURSE PRACTITIONER

## 2021-07-06 PROCEDURE — 84165 PROTEIN E-PHORESIS SERUM: CPT | Mod: TC | Performed by: NURSE PRACTITIONER

## 2021-07-06 PROCEDURE — 86334 IMMUNOFIX E-PHORESIS SERUM: CPT | Mod: TC | Performed by: NURSE PRACTITIONER

## 2021-07-06 PROCEDURE — 85025 COMPLETE CBC W/AUTO DIFF WBC: CPT | Performed by: NURSE PRACTITIONER

## 2021-07-06 PROCEDURE — 36415 COLL VENOUS BLD VENIPUNCTURE: CPT | Mod: ZL | Performed by: NURSE PRACTITIONER

## 2021-07-06 PROCEDURE — 83883 ASSAY NEPHELOMETRY NOT SPEC: CPT | Mod: ZL | Performed by: NURSE PRACTITIONER

## 2021-07-06 PROCEDURE — 84165 PROTEIN E-PHORESIS SERUM: CPT | Mod: 26 | Performed by: PATHOLOGY

## 2021-07-06 PROCEDURE — 82784 ASSAY IGA/IGD/IGG/IGM EACH: CPT | Mod: ZL | Performed by: NURSE PRACTITIONER

## 2021-07-06 PROCEDURE — 999N001036 HC STATISTIC TOTAL PROTEIN: Performed by: NURSE PRACTITIONER

## 2021-07-06 PROCEDURE — 86334 IMMUNOFIX E-PHORESIS SERUM: CPT | Mod: 26 | Performed by: PATHOLOGY

## 2021-07-08 LAB
ALBUMIN SERPL ELPH-MCNC: 4 G/DL (ref 3.7–5.1)
ALPHA1 GLOB SERPL ELPH-MCNC: 0.3 G/DL (ref 0.2–0.4)
ALPHA2 GLOB SERPL ELPH-MCNC: 0.7 G/DL (ref 0.5–0.9)
B-GLOBULIN SERPL ELPH-MCNC: 0.6 G/DL (ref 0.6–1)
GAMMA GLOB SERPL ELPH-MCNC: 1 G/DL (ref 0.7–1.6)
IGA SERPL-MCNC: 8 MG/DL (ref 84–499)
IGG SERPL-MCNC: 1042 MG/DL (ref 610–1616)
IGM SERPL-MCNC: 14 MG/DL (ref 35–242)
KAPPA LC UR-MCNC: 0.49 MG/DL (ref 0.33–1.94)
KAPPA LC/LAMBDA SER: 2.45 {RATIO} (ref 0.26–1.65)
LAMBDA LC SERPL-MCNC: 0.2 MG/DL (ref 0.57–2.63)
M PROTEIN SERPL ELPH-MCNC: 0.6 G/DL
PROT PATTERN SERPL ELPH-IMP: ABNORMAL
PROT PATTERN SERPL IFE-IMP: ABNORMAL

## 2021-07-13 ENCOUNTER — ONCOLOGY VISIT (OUTPATIENT)
Dept: ONCOLOGY | Facility: OTHER | Age: 71
End: 2021-07-13
Attending: NURSE PRACTITIONER
Payer: MEDICARE

## 2021-07-13 ENCOUNTER — APPOINTMENT (OUTPATIENT)
Dept: LAB | Facility: OTHER | Age: 71
End: 2021-07-13
Attending: INTERNAL MEDICINE
Payer: MEDICARE

## 2021-07-13 ENCOUNTER — INFUSION THERAPY VISIT (OUTPATIENT)
Dept: INFUSION THERAPY | Facility: OTHER | Age: 71
End: 2021-07-13
Attending: INTERNAL MEDICINE
Payer: MEDICARE

## 2021-07-13 VITALS
BODY MASS INDEX: 30.04 KG/M2 | WEIGHT: 169.53 LBS | SYSTOLIC BLOOD PRESSURE: 110 MMHG | RESPIRATION RATE: 16 BRPM | HEIGHT: 63 IN | HEART RATE: 81 BPM | OXYGEN SATURATION: 95 % | TEMPERATURE: 98.7 F | DIASTOLIC BLOOD PRESSURE: 60 MMHG

## 2021-07-13 VITALS
SYSTOLIC BLOOD PRESSURE: 110 MMHG | RESPIRATION RATE: 20 BRPM | HEIGHT: 63 IN | TEMPERATURE: 98.7 F | OXYGEN SATURATION: 95 % | HEART RATE: 81 BPM | WEIGHT: 169.53 LBS | DIASTOLIC BLOOD PRESSURE: 80 MMHG | BODY MASS INDEX: 30.04 KG/M2

## 2021-07-13 DIAGNOSIS — C90.00 MULTIPLE MYELOMA NOT HAVING ACHIEVED REMISSION (H): Primary | ICD-10-CM

## 2021-07-13 LAB
ALBUMIN SERPL-MCNC: 3.5 G/DL (ref 3.4–5)
ALP SERPL-CCNC: 112 U/L (ref 40–150)
ALT SERPL W P-5'-P-CCNC: 19 U/L (ref 0–50)
ANION GAP SERPL CALCULATED.3IONS-SCNC: 5 MMOL/L (ref 3–14)
AST SERPL W P-5'-P-CCNC: 12 U/L (ref 0–45)
BASOPHILS # BLD AUTO: 0 10E3/UL (ref 0–0.2)
BASOPHILS NFR BLD AUTO: 1 %
BILIRUB SERPL-MCNC: 0.4 MG/DL (ref 0.2–1.3)
BUN SERPL-MCNC: 19 MG/DL (ref 7–30)
CALCIUM SERPL-MCNC: 8.3 MG/DL (ref 8.5–10.1)
CHLORIDE BLD-SCNC: 107 MMOL/L (ref 94–109)
CO2 SERPL-SCNC: 25 MMOL/L (ref 20–32)
CREAT SERPL-MCNC: 0.71 MG/DL (ref 0.52–1.04)
EOSINOPHIL # BLD AUTO: 0.2 10E3/UL (ref 0–0.7)
EOSINOPHIL NFR BLD AUTO: 3 %
ERYTHROCYTE [DISTWIDTH] IN BLOOD BY AUTOMATED COUNT: 14.6 % (ref 10–15)
GFR SERPL CREATININE-BSD FRML MDRD: 85 ML/MIN/1.73M2
GLUCOSE BLD-MCNC: 102 MG/DL (ref 70–99)
HCT VFR BLD AUTO: 36.7 % (ref 35–47)
HGB BLD-MCNC: 12.6 G/DL (ref 11.7–15.7)
IMM GRANULOCYTES # BLD: 0.1 10E3/UL
IMM GRANULOCYTES NFR BLD: 1 %
LYMPHOCYTES # BLD AUTO: 1.2 10E3/UL (ref 0.8–5.3)
LYMPHOCYTES NFR BLD AUTO: 20 %
MCH RBC QN AUTO: 31.6 PG (ref 26.5–33)
MCHC RBC AUTO-ENTMCNC: 34.3 G/DL (ref 31.5–36.5)
MCV RBC AUTO: 92 FL (ref 78–100)
MONOCYTES # BLD AUTO: 0.8 10E3/UL (ref 0–1.3)
MONOCYTES NFR BLD AUTO: 13 %
NEUTROPHILS # BLD AUTO: 3.8 10E3/UL (ref 1.6–8.3)
NEUTROPHILS NFR BLD AUTO: 62 %
NRBC # BLD AUTO: 0 10E3/UL
NRBC BLD AUTO-RTO: 0 /100
PLATELET # BLD AUTO: 254 10E3/UL (ref 150–450)
POTASSIUM BLD-SCNC: 3.8 MMOL/L (ref 3.4–5.3)
PROT SERPL-MCNC: 6.9 G/DL (ref 6.8–8.8)
RBC # BLD AUTO: 3.99 10E6/UL (ref 3.8–5.2)
SODIUM SERPL-SCNC: 137 MMOL/L (ref 133–144)
WBC # BLD AUTO: 6 10E3/UL (ref 4–11)

## 2021-07-13 PROCEDURE — 96401 CHEMO ANTI-NEOPL SQ/IM: CPT

## 2021-07-13 PROCEDURE — G0463 HOSPITAL OUTPT CLINIC VISIT: HCPCS

## 2021-07-13 PROCEDURE — 36415 COLL VENOUS BLD VENIPUNCTURE: CPT | Mod: ZL | Performed by: NURSE PRACTITIONER

## 2021-07-13 PROCEDURE — G0463 HOSPITAL OUTPT CLINIC VISIT: HCPCS | Mod: 25

## 2021-07-13 PROCEDURE — 80053 COMPREHEN METABOLIC PANEL: CPT | Mod: ZL | Performed by: NURSE PRACTITIONER

## 2021-07-13 PROCEDURE — 99213 OFFICE O/P EST LOW 20 MIN: CPT | Performed by: NURSE PRACTITIONER

## 2021-07-13 PROCEDURE — 250N000011 HC RX IP 250 OP 636: Mod: JW | Performed by: NURSE PRACTITIONER

## 2021-07-13 PROCEDURE — 85025 COMPLETE CBC W/AUTO DIFF WBC: CPT | Mod: ZL | Performed by: NURSE PRACTITIONER

## 2021-07-13 PROCEDURE — 250N000013 HC RX MED GY IP 250 OP 250 PS 637: Performed by: NURSE PRACTITIONER

## 2021-07-13 RX ORDER — EPINEPHRINE 1 MG/ML
0.3 INJECTION, SOLUTION, CONCENTRATE INTRAVENOUS EVERY 5 MIN PRN
Status: CANCELLED | OUTPATIENT
Start: 2021-07-13

## 2021-07-13 RX ORDER — ALBUTEROL SULFATE 0.83 MG/ML
2.5 SOLUTION RESPIRATORY (INHALATION)
Status: CANCELLED | OUTPATIENT
Start: 2021-07-20

## 2021-07-13 RX ORDER — HEPARIN SODIUM,PORCINE 10 UNIT/ML
5 VIAL (ML) INTRAVENOUS
Status: CANCELLED | OUTPATIENT
Start: 2021-07-16

## 2021-07-13 RX ORDER — DIPHENHYDRAMINE HYDROCHLORIDE 50 MG/ML
50 INJECTION INTRAMUSCULAR; INTRAVENOUS
Status: CANCELLED
Start: 2021-07-16

## 2021-07-13 RX ORDER — LORAZEPAM 2 MG/ML
0.5 INJECTION INTRAMUSCULAR EVERY 4 HOURS PRN
Status: CANCELLED | OUTPATIENT
Start: 2021-07-13

## 2021-07-13 RX ORDER — DIPHENHYDRAMINE HCL 50 MG
50 CAPSULE ORAL ONCE
Status: CANCELLED | OUTPATIENT
Start: 2021-07-13

## 2021-07-13 RX ORDER — METHYLPREDNISOLONE SODIUM SUCCINATE 125 MG/2ML
125 INJECTION, POWDER, LYOPHILIZED, FOR SOLUTION INTRAMUSCULAR; INTRAVENOUS
Status: CANCELLED
Start: 2021-07-20

## 2021-07-13 RX ORDER — ALBUTEROL SULFATE 90 UG/1
1-2 AEROSOL, METERED RESPIRATORY (INHALATION)
Status: CANCELLED
Start: 2021-07-16

## 2021-07-13 RX ORDER — ACETAMINOPHEN 325 MG/1
650 TABLET ORAL ONCE
Status: COMPLETED | OUTPATIENT
Start: 2021-07-13 | End: 2021-07-13

## 2021-07-13 RX ORDER — ALBUTEROL SULFATE 0.83 MG/ML
2.5 SOLUTION RESPIRATORY (INHALATION)
Status: CANCELLED | OUTPATIENT
Start: 2021-07-23

## 2021-07-13 RX ORDER — NALOXONE HYDROCHLORIDE 0.4 MG/ML
.1-.4 INJECTION, SOLUTION INTRAMUSCULAR; INTRAVENOUS; SUBCUTANEOUS
Status: CANCELLED | OUTPATIENT
Start: 2021-07-23

## 2021-07-13 RX ORDER — SODIUM CHLORIDE 9 MG/ML
1000 INJECTION, SOLUTION INTRAVENOUS CONTINUOUS PRN
Status: CANCELLED
Start: 2021-07-23

## 2021-07-13 RX ORDER — EPINEPHRINE 1 MG/ML
0.3 INJECTION, SOLUTION, CONCENTRATE INTRAVENOUS EVERY 5 MIN PRN
Status: CANCELLED | OUTPATIENT
Start: 2021-07-16

## 2021-07-13 RX ORDER — NALOXONE HYDROCHLORIDE 0.4 MG/ML
.1-.4 INJECTION, SOLUTION INTRAMUSCULAR; INTRAVENOUS; SUBCUTANEOUS
Status: CANCELLED | OUTPATIENT
Start: 2021-07-20

## 2021-07-13 RX ORDER — SODIUM CHLORIDE 9 MG/ML
1000 INJECTION, SOLUTION INTRAVENOUS CONTINUOUS PRN
Status: CANCELLED
Start: 2021-07-20

## 2021-07-13 RX ORDER — HEPARIN SODIUM (PORCINE) LOCK FLUSH IV SOLN 100 UNIT/ML 100 UNIT/ML
5 SOLUTION INTRAVENOUS
Status: CANCELLED | OUTPATIENT
Start: 2021-07-13

## 2021-07-13 RX ORDER — HEPARIN SODIUM (PORCINE) LOCK FLUSH IV SOLN 100 UNIT/ML 100 UNIT/ML
5 SOLUTION INTRAVENOUS
Status: CANCELLED | OUTPATIENT
Start: 2021-07-20

## 2021-07-13 RX ORDER — LORAZEPAM 2 MG/ML
0.5 INJECTION INTRAMUSCULAR EVERY 4 HOURS PRN
Status: CANCELLED | OUTPATIENT
Start: 2021-07-16

## 2021-07-13 RX ORDER — DIPHENHYDRAMINE HCL 50 MG
50 CAPSULE ORAL ONCE
Status: COMPLETED | OUTPATIENT
Start: 2021-07-13 | End: 2021-07-13

## 2021-07-13 RX ORDER — HEPARIN SODIUM,PORCINE 10 UNIT/ML
5 VIAL (ML) INTRAVENOUS
Status: CANCELLED | OUTPATIENT
Start: 2021-07-23

## 2021-07-13 RX ORDER — EPINEPHRINE 1 MG/ML
0.3 INJECTION, SOLUTION, CONCENTRATE INTRAVENOUS EVERY 5 MIN PRN
Status: CANCELLED | OUTPATIENT
Start: 2021-07-23

## 2021-07-13 RX ORDER — HEPARIN SODIUM,PORCINE 10 UNIT/ML
5 VIAL (ML) INTRAVENOUS
Status: CANCELLED | OUTPATIENT
Start: 2021-07-20

## 2021-07-13 RX ORDER — NALOXONE HYDROCHLORIDE 0.4 MG/ML
.1-.4 INJECTION, SOLUTION INTRAMUSCULAR; INTRAVENOUS; SUBCUTANEOUS
Status: CANCELLED | OUTPATIENT
Start: 2021-07-13

## 2021-07-13 RX ORDER — HEPARIN SODIUM,PORCINE 10 UNIT/ML
5 VIAL (ML) INTRAVENOUS
Status: CANCELLED | OUTPATIENT
Start: 2021-07-13

## 2021-07-13 RX ORDER — METHYLPREDNISOLONE SODIUM SUCCINATE 125 MG/2ML
125 INJECTION, POWDER, LYOPHILIZED, FOR SOLUTION INTRAMUSCULAR; INTRAVENOUS
Status: CANCELLED
Start: 2021-07-13

## 2021-07-13 RX ORDER — LORAZEPAM 2 MG/ML
0.5 INJECTION INTRAMUSCULAR EVERY 4 HOURS PRN
Status: CANCELLED | OUTPATIENT
Start: 2021-07-20

## 2021-07-13 RX ORDER — SODIUM CHLORIDE 9 MG/ML
1000 INJECTION, SOLUTION INTRAVENOUS CONTINUOUS PRN
Status: CANCELLED
Start: 2021-07-13

## 2021-07-13 RX ORDER — ACETAMINOPHEN 325 MG/1
650 TABLET ORAL ONCE
Status: CANCELLED | OUTPATIENT
Start: 2021-07-13

## 2021-07-13 RX ORDER — METHYLPREDNISOLONE SODIUM SUCCINATE 125 MG/2ML
125 INJECTION, POWDER, LYOPHILIZED, FOR SOLUTION INTRAMUSCULAR; INTRAVENOUS
Status: CANCELLED
Start: 2021-07-16

## 2021-07-13 RX ORDER — NALOXONE HYDROCHLORIDE 0.4 MG/ML
.1-.4 INJECTION, SOLUTION INTRAMUSCULAR; INTRAVENOUS; SUBCUTANEOUS
Status: CANCELLED | OUTPATIENT
Start: 2021-07-16

## 2021-07-13 RX ORDER — EPINEPHRINE 1 MG/ML
0.3 INJECTION, SOLUTION, CONCENTRATE INTRAVENOUS EVERY 5 MIN PRN
Status: CANCELLED | OUTPATIENT
Start: 2021-07-20

## 2021-07-13 RX ORDER — ALBUTEROL SULFATE 0.83 MG/ML
2.5 SOLUTION RESPIRATORY (INHALATION)
Status: CANCELLED | OUTPATIENT
Start: 2021-07-13

## 2021-07-13 RX ORDER — DIPHENHYDRAMINE HYDROCHLORIDE 50 MG/ML
50 INJECTION INTRAMUSCULAR; INTRAVENOUS
Status: CANCELLED
Start: 2021-07-23

## 2021-07-13 RX ORDER — MEPERIDINE HYDROCHLORIDE 25 MG/ML
25 INJECTION INTRAMUSCULAR; INTRAVENOUS; SUBCUTANEOUS EVERY 30 MIN PRN
Status: CANCELLED | OUTPATIENT
Start: 2021-07-16

## 2021-07-13 RX ORDER — SODIUM CHLORIDE 9 MG/ML
1000 INJECTION, SOLUTION INTRAVENOUS CONTINUOUS PRN
Status: CANCELLED
Start: 2021-07-16

## 2021-07-13 RX ORDER — ALBUTEROL SULFATE 90 UG/1
1-2 AEROSOL, METERED RESPIRATORY (INHALATION)
Status: CANCELLED
Start: 2021-07-20

## 2021-07-13 RX ORDER — ALBUTEROL SULFATE 0.83 MG/ML
2.5 SOLUTION RESPIRATORY (INHALATION)
Status: CANCELLED | OUTPATIENT
Start: 2021-07-16

## 2021-07-13 RX ORDER — DIPHENHYDRAMINE HYDROCHLORIDE 50 MG/ML
50 INJECTION INTRAMUSCULAR; INTRAVENOUS
Status: CANCELLED
Start: 2021-07-13

## 2021-07-13 RX ORDER — MEPERIDINE HYDROCHLORIDE 25 MG/ML
25 INJECTION INTRAMUSCULAR; INTRAVENOUS; SUBCUTANEOUS EVERY 30 MIN PRN
Status: CANCELLED | OUTPATIENT
Start: 2021-07-23

## 2021-07-13 RX ORDER — METHYLPREDNISOLONE SODIUM SUCCINATE 125 MG/2ML
125 INJECTION, POWDER, LYOPHILIZED, FOR SOLUTION INTRAMUSCULAR; INTRAVENOUS
Status: CANCELLED
Start: 2021-07-23

## 2021-07-13 RX ORDER — MEPERIDINE HYDROCHLORIDE 25 MG/ML
25 INJECTION INTRAMUSCULAR; INTRAVENOUS; SUBCUTANEOUS EVERY 30 MIN PRN
Status: CANCELLED | OUTPATIENT
Start: 2021-07-13

## 2021-07-13 RX ORDER — DIPHENHYDRAMINE HYDROCHLORIDE 50 MG/ML
50 INJECTION INTRAMUSCULAR; INTRAVENOUS
Status: CANCELLED
Start: 2021-07-20

## 2021-07-13 RX ORDER — MEPERIDINE HYDROCHLORIDE 25 MG/ML
25 INJECTION INTRAMUSCULAR; INTRAVENOUS; SUBCUTANEOUS EVERY 30 MIN PRN
Status: CANCELLED | OUTPATIENT
Start: 2021-07-20

## 2021-07-13 RX ORDER — ALBUTEROL SULFATE 90 UG/1
1-2 AEROSOL, METERED RESPIRATORY (INHALATION)
Status: CANCELLED
Start: 2021-07-23

## 2021-07-13 RX ORDER — LORAZEPAM 2 MG/ML
0.5 INJECTION INTRAMUSCULAR EVERY 4 HOURS PRN
Status: CANCELLED | OUTPATIENT
Start: 2021-07-23

## 2021-07-13 RX ORDER — HEPARIN SODIUM (PORCINE) LOCK FLUSH IV SOLN 100 UNIT/ML 100 UNIT/ML
5 SOLUTION INTRAVENOUS
Status: CANCELLED | OUTPATIENT
Start: 2021-07-23

## 2021-07-13 RX ORDER — HEPARIN SODIUM (PORCINE) LOCK FLUSH IV SOLN 100 UNIT/ML 100 UNIT/ML
5 SOLUTION INTRAVENOUS
Status: CANCELLED | OUTPATIENT
Start: 2021-07-16

## 2021-07-13 RX ORDER — ALBUTEROL SULFATE 90 UG/1
1-2 AEROSOL, METERED RESPIRATORY (INHALATION)
Status: CANCELLED
Start: 2021-07-13

## 2021-07-13 RX ADMIN — BORTEZOMIB 2.4 MG: 3.5 INJECTION, POWDER, LYOPHILIZED, FOR SOLUTION INTRAVENOUS; SUBCUTANEOUS at 15:12

## 2021-07-13 RX ADMIN — DIPHENHYDRAMINE HYDROCHLORIDE 50 MG: 50 CAPSULE ORAL at 14:54

## 2021-07-13 RX ADMIN — ACETAMINOPHEN 650 MG: 325 TABLET, FILM COATED ORAL at 14:54

## 2021-07-13 RX ADMIN — DARATUMUMAB AND HYALURONIDASE-FIHJ (HUMAN RECOMBINANT) 1800 MG: 1800; 30000 INJECTION SUBCUTANEOUS at 15:17

## 2021-07-13 ASSESSMENT — PAIN SCALES - GENERAL
PAINLEVEL: NO PAIN (0)
PAINLEVEL: NO PAIN (0)

## 2021-07-13 ASSESSMENT — MIFFLIN-ST. JEOR
SCORE: 1248.13
SCORE: 1248.13

## 2021-07-13 NOTE — PATIENT INSTRUCTIONS
We would like to see you back per your schedule.     When you are in need of a refill, please call your pharmacy and they will send us a request.     If you have any questions please call [phone]    Other instructions:  none

## 2021-07-13 NOTE — NURSING NOTE
"Chief Complaint   Patient presents with     Oncology Clinic Visit     Follow up Multiple Myeloma       Initial /80   Pulse 81   Temp 98.7  F (37.1  C) (Tympanic)   Resp 20   Ht 1.6 m (5' 3\")   Wt 76.9 kg (169 lb 8.5 oz)   SpO2 95%   BMI 30.03 kg/m   Estimated body mass index is 30.03 kg/m  as calculated from the following:    Height as of this encounter: 1.6 m (5' 3\").    Weight as of this encounter: 76.9 kg (169 lb 8.5 oz).  Medication Reconciliation: complete.  Immunizations and advance directives status reviewed. Pain scale =0 , PHQ-2=0.              Vilma Chi LPN    "

## 2021-07-13 NOTE — PROGRESS NOTES
Patient is 70 years old, here today accompanied by self for injection of Faspro and Velcade per order of Dr Walker.     Labs meet parameters for today's injection.     Patient denies questions nor concerns regarding medication, administration site, side effects, nor aftercare.  Patient identified with two identifiers, order verified, and verbal consent for today's injection obtained from patient.   Patient education provided on s/s of injection site infection, and/or medication specific side effects, and when to call a provider.  Patient instructed to report any adverse effects.     Faspro administered per protocol in left upper outer abdomen at 45 degree angle.  Site covered with sterile bandage.  Patient tolerated injection well, no verbal nor non-verbal signs of discomfort noted.  No adverse effects noted at this time.     Velcade administered per protocol in right upper outer arm at 45 degree angle.  Site covered with sterile bandage.  Patient tolerated injection well, no verbal nor non-verbal signs of discomfort noted.  No adverse effects noted at this time.     Patient instructed to call with further questions or concerns.  Patient states understanding and is in agreement with this plan.  Copy of appointments, and after visit summary (AVS) given to patient. Patient discharged.

## 2021-07-13 NOTE — PROGRESS NOTES
Oncology Follow-up Visit:  July 13, 2021    Reason for Visit:  Patient presents with:  Oncology Clinic Visit: Follow up Multiple Myeloma     Nursing Note and documentation reviewed: yes    HPI:   This is a 70-year-old female patient who presents to the oncology clinic today for evaluation prior to receiving cycle 10 day 1 therapy for Stage II-RISS IgG multiple myeloma diagnosed June 2019.  She progressed on Velcade and dexamethasone and CyBorD and treated was changed to Darzalex faspro injection. M-spike went from 1.0 to 1.2 in March so Velcade and dex were added to treatment plan.     She presents to the clinic today stating she is doing pretty well.  She does have some increasing fatigue but admits she just naps when she gets tired and this does seem to greatly help.  She has been walking, trying to do this about 3 days/week.  She essentially has no new complaints.  She denies any issues with shortness of breath when she is active.  She is on calcium with vitamin D twice a day and did receive her Zometa infusion in June.    Oncologic History:     12/31/2018   patient presented to the emergency room with vertigo and fatigue.  CT scan of the head was negative and subsequent stress test was negative.  5/3/2019  She was seen by her PCP who ordered lab work and noted a total protein of 12.9.  SPEP at that time showed an M spike of 6.2 with a large monoclonal protein seen in the gamma fraction.  Urine immunofixation showed a possible small protein band in the gamma fraction  5/31/2019  she was evaluated by Dr. Walker with Medical Oncology with plan to rule out myeloma and obtain bone marrow aspiration biopsy as well as a metastatic bone survey along with additional labwork  6/18/2019 she underwent bone marrow aspiration and biopsy  6/24/2019  She was seen again by Dr. Walker and CBC showed a hemoglobin of 9.3, M spike 7.3 with monoclonal IgG immunoglobulin of kappa light chain type; serum viscosity was 2.9;  quantitative immunoglobulins showed an IgG of 8160, beta-2 microglobulin was 5.8, BUN was 21 with creatinine is 0.8 and total protein was 13.7.  Quantitative kappa/lambda free light chains showed an elevated ratio of 17.0 bone marrow aspiration biopsy showed plasma cell myeloma with approximately 80% plasma cells.  Immunofixation showed IgG kappa and flow cytometry revealed kappa monotypic plasma cells consistent with clonal plasma cell neoplasm and FISH panel was pending at that time.  It was felt she had at least stage II disease based on her beta-2 microglobulin and anemia.  Plan was to treat with Velcade 1.3 mg per metered squared days 1, 4, 8 and 11/Decadron 40 mg on days 1, 8 and 15 initiation of Revlimid with the second cycle at 25 mg daily days 1 through 14.  Plan was to also obtain an MRI of the lumbar spine to rule out lytic lesion at L3.  She was initiated on Zovirax 400 mg p.o. twice daily.  6/25/2019  1st cycle of chemotherapy initiated  7/1/2019 note in chart regarding patient's large co-pay for the Revlimid and no plan at this point to initiate Revlimid and treat only with Velcade and Decadron per Dr. Walker  7/11/2019  MRI lumbar spine shows a pathologic superior endplate compression fracture at L3 without evidence of retropulsed fragment and innumerable enhancing lesions throughout the lumbar spine consistent with history of multiple myeloma.  9/10/2019  Increasing M-spike and kappa lambda ratio  10/1/2019  Initiation of CyBorD  11/17/2021  Stabilization of M-spike at 1.2  12/1/2020  Initiation of Darzalex Faspro injection  3/23/2021  M-spike increased form 1.0 to 1.2 and velcade and dexamethasone added to plan     Current Chemo Regime/TX:  Darzalex faspro injection 1800mg subcutaneous per protocol with velcade 1.3mg/m2 and dexamethasone 20mg added to day 4, 8, 11 with cycle given every 21 days      **Zometa 4mg every 3 months  Current Cycle: 10 day 1 (cycle 5 added velcade dex)  # of completed  "cycles:  9     Previous treatment:   Velcade 1.3 mg/m2 days 1, 4, 8 and 11 with Decadron 40 mg days 1, 8 and 15 x 4 cycles;   Velcade 1.5mg.m2/cyclophosphamide 150mg every 7 days on days 1,8,15 and 22/decadron 40mg days 1,8,15,22 ; Darzalex faspro injection 1800mg subcutaneous per protocol     Past Medical History:   Diagnosis Date     Arthritis      Depressive disorder      Diabetes mellitus, type 2 (H) 1/18/2021     Essential hypertension 10/1/2015     Major depressive disorder, recurrent episode, mild (H) 10/1/2015     Mixed hyperlipidemia 10/1/2015     Multiple myeloma not having achieved remission (H) 6/24/2019     Other specified disorders of bladder 07/09/2012    Irritable Bladder     Seasonal allergies 10/1/2015     Unspecified essential hypertension 03/19/2007     Unspecified sinusitis (chronic) 09/05/2007       Past Surgical History:   Procedure Laterality Date     APPENDECTOMY       BONE MARROW BIOPSY, BONE SPECIMEN, NEEDLE/TROCAR N/A 6/18/2019    Procedure: BONE MARROW BIOPSY;  Surgeon: Maciej Sanz MD;  Location: [facility]     CHOLECYSTECTOMY       COLONOSCOPY  07-    repeat 10 years     COLONOSCOPY N/A 12/30/2016    Procedure: COLONOSCOPY;  Surgeon: Bhaskar Franklin DO;  Location: [facility]     SINUS SURGERY       TUBAL STERILIZATION         Family History   Problem Relation Age of Onset     Breast Cancer Mother 66        Cause of Death     Parkinsonism Father         \"Possible\"     Coronary Artery Disease Father      Pacemaker Father      Thyroid Disease Daughter      Diabetes No family hx of      Hypertension No family hx of      Hyperlipidemia No family hx of      Cerebrovascular Disease No family hx of      Colon Cancer No family hx of      Prostate Cancer No family hx of      Genetic Disorder No family hx of      Asthma No family hx of      Anesthesia Reaction No family hx of        Social History     Socioeconomic History     Marital status:      Spouse name: Not on file     Number " of children: Not on file     Years of education: Not on file     Highest education level: Not on file   Occupational History     Occupation: Financial     Comment:  - ([employer])   Tobacco Use     Smoking status: Never Smoker     Smokeless tobacco: Never Used     Tobacco comment: Tried to Quit (YES); QUIT in 1971; Passive Exposure (NO)   Substance and Sexual Activity     Alcohol use: Yes     Comment: RARELY     Drug use: No     Sexual activity: Yes     Partners: Male     Birth control/protection: None   Other Topics Concern      Service Not Asked     Blood Transfusions Not Asked     Caffeine Concern Yes     Comment: Coffee >6 cups daily     Occupational Exposure Not Asked     Hobby Hazards Not Asked     Sleep Concern Not Asked     Stress Concern Not Asked     Weight Concern Not Asked     Special Diet Not Asked     Back Care Not Asked     Exercise Not Asked     Bike Helmet Not Asked     Seat Belt Not Asked     Self-Exams Not Asked     Parent/sibling w/ CABG, MI or angioplasty before 65F 55M? No   Social History Narrative     Not on file     Social Determinants of Health     Financial Resource Strain:      Difficulty of Paying Living Expenses:    Food Insecurity:      Worried About Running Out of Food in the Last Year:      Ran Out of Food in the Last Year:    Transportation Needs:      Lack of Transportation (Medical):      Lack of Transportation (Non-Medical):    Physical Activity:      Days of Exercise per Week:      Minutes of Exercise per Session:    Stress:      Feeling of Stress :    Social Connections:      Frequency of Communication with Friends and Family:      Frequency of Social Gatherings with Friends and Family:      Attends Muslim Services:      Active Member of Clubs or Organizations:      Attends Club or Organization Meetings:      Marital Status:    Intimate Partner Violence:      Fear of Current or Ex-Partner:      Emotionally Abused:      Physically Abused:      Sexually Abused:         Current Outpatient Medications   Medication     acyclovir (ZOVIRAX) 400 MG tablet     aspirin (ASA) 81 MG chewable tablet     atorvastatin (LIPITOR) 10 MG tablet     dexamethasone (DECADRON) 4 MG tablet     fluticasone (FLONASE) 50 MCG/ACT nasal spray     losartan (COZAAR) 50 MG tablet     sertraline (ZOLOFT) 50 MG tablet     EPINEPHrine (EPIPEN) 0.3 MG/0.3ML injection     prochlorperazine (COMPAZINE) 10 MG tablet     No current facility-administered medications for this visit.        Allergies   Allergen Reactions     Lisinopril Cough     Phenylephrine Hcl Other (See Comments)     **Entex  HEADACHE (SEVERE)     Phenylpropanolamine Other (See Comments)     **Entex  HEADACHE (SEVERE)     Pseudoephedrine Tannate Other (See Comments)     **Entex  HEADACHE (SEVERE)     Levofloxacin Rash     **Levaquin     Moxifloxacin Hcl [Avelox] Rash       Review Of Systems:  Constitutional:    denies fever, weight changes, chills, and night sweats.  Eyes:    denies double vision; has some blurred vision at times-has new glasses  Ears/Nose/Throat:   denies ear pain, nose problems, difficulty swallowing  Respiratory:   denies shortness of breath, cough   Skin:   denies rash, lesions  Cardiovascular:   denies chest pain, edema; has occasional feeling of missed beat after the velcade  Gastrointestinal:   denies abdominal pain, bloating, nausea, early satiety; no change in bowel habits or blood in stool  Genitourinary:   denies difficulty with urination, blood in urine  Musculoskeletal:    denies new muscle pain, bone pain-has right hip for last 10 years but somewhat worse over last few months  Neurologic:   denies lightheadedness, headaches, numbness or tingling  Psychiatric:   denies anxiety, depression  Hematologic/Lymphatic/Immunologic:   denies easy bruising, easy bleeding, lumps or bumps noted  Endocrine:   Denies increased thirst      ECOG Performance Status: 1    Physical Exam:  /80   Pulse 81   Temp 98.7  F (37.1  " C) (Tympanic)   Resp 20   Ht 1.6 m (5' 3\")   Wt 76.9 kg (169 lb 8.5 oz)   SpO2 95%   BMI 30.03 kg/m      GENERAL APPEARANCE: Healthy, alert and in no acute distress.  HEENT: Normocephalic, Sclerae anicteric.   NECK:   No asymmetry or masses, no thyromegaly.  LYMPHATICS: No palpable cervical, supraclavicular, axillary, or inguinal nodes   RESP: Lungs clear to auscultation bilaterally, respirations regular and easy  CARDIOVASCULAR: Regular rate and rhythm. Normal S1, S2; no murmur, gallop, or rub.  ABDOMEN: Soft, nontender. Bowel sounds auscultated all 4 quadrants. No palpable organomegaly or masses.  BREAST/Chest wall: no concerning lumps, masses or tenderness bilaterally  MUSCULOSKELETAL: Extremities without gross deformities noted. No edema of bilateral lower extremities.  NEURO: Alert and oriented x 3.  Gait steady.  PSYCHIATRIC: Mentation and affect appear normal.  Mood appropriate.    Laboratory:  Results for orders placed or performed in visit on 07/13/21   Comprehensive metabolic panel     Status: Abnormal   Result Value Ref Range    Sodium 137 133 - 144 mmol/L    Potassium 3.8 3.4 - 5.3 mmol/L    Chloride 107 94 - 109 mmol/L    Carbon Dioxide (CO2) 25 20 - 32 mmol/L    Anion Gap 5 3 - 14 mmol/L    Urea Nitrogen 19 7 - 30 mg/dL    Creatinine 0.71 0.52 - 1.04 mg/dL    Calcium 8.3 (L) 8.5 - 10.1 mg/dL    Glucose 102 (H) 70 - 99 mg/dL    Alkaline Phosphatase 112 40 - 150 U/L    AST 12 0 - 45 U/L    ALT 19 0 - 50 U/L    Protein Total 6.9 6.8 - 8.8 g/dL    Albumin 3.5 3.4 - 5.0 g/dL    Bilirubin Total 0.4 0.2 - 1.3 mg/dL    GFR Estimate 85 >60 mL/min/1.73m2   CBC with platelets and differential     Status: Abnormal   Result Value Ref Range    WBC Count 6.0 4.0 - 11.0 10e3/uL    RBC Count 3.99 3.80 - 5.20 10e6/uL    Hemoglobin 12.6 11.7 - 15.7 g/dL    Hematocrit 36.7 35.0 - 47.0 %    MCV 92 78 - 100 fL    MCH 31.6 26.5 - 33.0 pg    MCHC 34.3 31.5 - 36.5 g/dL    RDW 14.6 10.0 - 15.0 %    Platelet Count 254 150 " - 450 10e3/uL    % Neutrophils 62 %    % Lymphocytes 20 %    % Monocytes 13 %    % Eosinophils 3 %    % Basophils 1 %    % Immature Granulocytes 1 %    NRBCs per 100 WBC 0 <1 /100    Absolute Neutrophils 3.8 1.6 - 8.3 10e3/uL    Absolute Lymphocytes 1.2 0.8 - 5.3 10e3/uL    Absolute Monocytes 0.8 0.0 - 1.3 10e3/uL    Absolute Eosinophils 0.2 0.0 - 0.7 10e3/uL    Absolute Basophils 0.0 0.0 - 0.2 10e3/uL    Absolute Immature Granulocytes 0.1 (H) <=0.0 10e3/uL    Absolute NRBCs 0.0 10e3/uL   CBC with platelets and differential     Status: Abnormal    Narrative    The following orders were created for panel order CBC with platelets and differential.  Procedure                               Abnormality         Status                     ---------                               -----------         ------                     CBC with platelets and d...[[id number]]  Abnormal            Final result                 Please view results for these tests on the individual orders.     Component      Latest Ref Rng & Units 7/6/2021   Albumin Fraction      3.7 - 5.1 g/dL 4.0   Alpha 1 Fraction      0.2 - 0.4 g/dL 0.3   Alpha 2 Fraction      0.5 - 0.9 g/dL 0.7   Beta Fraction      0.6 - 1.0 g/dL 0.6   Gamma Fraction      0.7 - 1.6 g/dL 1.0   Monoclonal Peak      0.0 g/dL 0.6 (H)   ELP Interpretation:       Monoclonal protein (0.6 g/dL) seen in the gamma fraction. See immunofixation report on . . .   Immunofixation ELP       (Note)   IGG      610 - 1,616 mg/dL 1,042   IGA      84 - 499 mg/dL 8 (L)   IGM      35 - 242 mg/dL 14 (L)   [place]velt Free Lt Chain      0.33 - 1.94 mg/dL 0.49   Lambda Free Lt Chain      0.57 - 2.63 mg/dL 0.20 (L)   Kappa Lambda Ratio      0.26 - 1.65 2.45 (H)     Imaging Studies:  None completed for today's visit      ASSESSMENT/PLAN:    #1 Multiple myeloma:   IgG kappa multiple myeloma with 80% involvement of the bone marrow diagnosed June 2019 initially treated with Velcade and Decadron and received 4 cycles with  increasing M-spike and kappa/lambda ratio.  Treatment changed to CyBorD on 10/1/2019.  M-spike plateau at 1.2 and treatment changed to monotherapy with Darzalex Faspro injection.     M spike declined to 1.0 then increased again to 1.2 so Velcade and Dex added to her treatment plan with cycle 5 therapy.  M-spike down to 0.6.  Will initiate cycle 10 and patient will follow up prior to cycle 11 with a CBC, CMP, SPEP, protein immunofixation and kappa lambda ratio.      #2 lytic lesion: She will continue with the Zometa every 3 months. She remains on calcium with vitamin D twice a day and dental exams at least every 6 months.      I encouraged patient to call with any questions or concerns.    Barbie Quintana NP  APRN, FNP-BC, AOCNP

## 2021-07-16 ENCOUNTER — INFUSION THERAPY VISIT (OUTPATIENT)
Dept: INFUSION THERAPY | Facility: OTHER | Age: 71
End: 2021-07-16
Attending: INTERNAL MEDICINE
Payer: MEDICARE

## 2021-07-16 VITALS
HEART RATE: 102 BPM | BODY MASS INDEX: 29.91 KG/M2 | SYSTOLIC BLOOD PRESSURE: 113 MMHG | OXYGEN SATURATION: 93 % | WEIGHT: 168.87 LBS | TEMPERATURE: 99.1 F | DIASTOLIC BLOOD PRESSURE: 79 MMHG | RESPIRATION RATE: 17 BRPM

## 2021-07-16 DIAGNOSIS — C90.00 MULTIPLE MYELOMA NOT HAVING ACHIEVED REMISSION (H): Primary | ICD-10-CM

## 2021-07-16 PROCEDURE — 96401 CHEMO ANTI-NEOPL SQ/IM: CPT

## 2021-07-16 PROCEDURE — 250N000011 HC RX IP 250 OP 636: Performed by: NURSE PRACTITIONER

## 2021-07-16 RX ADMIN — BORTEZOMIB 2.4 MG: 3.5 INJECTION, POWDER, LYOPHILIZED, FOR SOLUTION INTRAVENOUS; SUBCUTANEOUS at 13:23

## 2021-07-16 ASSESSMENT — PAIN SCALES - GENERAL: PAINLEVEL: NO PAIN (0)

## 2021-07-16 NOTE — PROGRESS NOTES
Patient is a 70 year old male  her today for injection of Velcade per order of . Patient identified with two identifiers, order verified, and verbal consent for today's infusion obtained from patient. Written consent for treatment is on file and valid.    Recent lab values: Labs were obtained on Monday.  Patient meets order parameters for today's treatment.    Velcade dose verified with Barron RICH RN, prior to release of drug.      Patient denies questions or concerns regarding injection and/or medication(s) being administered.    Velcade injected SQ into left back arm at 45 degree angle per protocol rotating sites. Patient tolerated injection without incident, no signs or symptoms of adverse reaction noted. Patient denies pain or discomfort.     Covered with a sterile bandage. Pt instructed to leave bandage intact for a minimum of one hour, and to call with questions or concerns. Copy of appointments, discharge instructions, and after visit summary (AVS) provided to patient. Patient states understanding, discharged.

## 2021-07-20 ENCOUNTER — INFUSION THERAPY VISIT (OUTPATIENT)
Dept: INFUSION THERAPY | Facility: OTHER | Age: 71
End: 2021-07-20
Attending: INTERNAL MEDICINE
Payer: MEDICARE

## 2021-07-20 VITALS
BODY MASS INDEX: 29.8 KG/M2 | DIASTOLIC BLOOD PRESSURE: 80 MMHG | TEMPERATURE: 99.2 F | OXYGEN SATURATION: 94 % | HEART RATE: 87 BPM | WEIGHT: 168.21 LBS | HEIGHT: 63 IN | SYSTOLIC BLOOD PRESSURE: 104 MMHG | RESPIRATION RATE: 18 BRPM

## 2021-07-20 DIAGNOSIS — C90.00 MULTIPLE MYELOMA NOT HAVING ACHIEVED REMISSION (H): Primary | ICD-10-CM

## 2021-07-20 LAB
BASOPHILS # BLD AUTO: 0 10E3/UL (ref 0–0.2)
BASOPHILS NFR BLD AUTO: 0 %
EOSINOPHIL # BLD AUTO: 0.1 10E3/UL (ref 0–0.7)
EOSINOPHIL NFR BLD AUTO: 3 %
ERYTHROCYTE [DISTWIDTH] IN BLOOD BY AUTOMATED COUNT: 14.6 % (ref 10–15)
HCT VFR BLD AUTO: 38.9 % (ref 35–47)
HGB BLD-MCNC: 13.1 G/DL (ref 11.7–15.7)
IMM GRANULOCYTES # BLD: 0.1 10E3/UL
IMM GRANULOCYTES NFR BLD: 1 %
LYMPHOCYTES # BLD AUTO: 0.6 10E3/UL (ref 0.8–5.3)
LYMPHOCYTES NFR BLD AUTO: 13 %
MCH RBC QN AUTO: 31 PG (ref 26.5–33)
MCHC RBC AUTO-ENTMCNC: 33.7 G/DL (ref 31.5–36.5)
MCV RBC AUTO: 92 FL (ref 78–100)
MONOCYTES # BLD AUTO: 0.4 10E3/UL (ref 0–1.3)
MONOCYTES NFR BLD AUTO: 9 %
NEUTROPHILS # BLD AUTO: 3.4 10E3/UL (ref 1.6–8.3)
NEUTROPHILS NFR BLD AUTO: 74 %
NRBC # BLD AUTO: 0 10E3/UL
NRBC BLD AUTO-RTO: 0 /100
PLATELET # BLD AUTO: 217 10E3/UL (ref 150–450)
RBC # BLD AUTO: 4.23 10E6/UL (ref 3.8–5.2)
WBC # BLD AUTO: 4.6 10E3/UL (ref 4–11)

## 2021-07-20 PROCEDURE — 85025 COMPLETE CBC W/AUTO DIFF WBC: CPT | Mod: ZL | Performed by: NURSE PRACTITIONER

## 2021-07-20 PROCEDURE — 96401 CHEMO ANTI-NEOPL SQ/IM: CPT

## 2021-07-20 PROCEDURE — 250N000011 HC RX IP 250 OP 636: Performed by: NURSE PRACTITIONER

## 2021-07-20 PROCEDURE — 36415 COLL VENOUS BLD VENIPUNCTURE: CPT | Mod: ZL | Performed by: NURSE PRACTITIONER

## 2021-07-20 RX ADMIN — BORTEZOMIB 2.4 MG: 3.5 INJECTION, POWDER, LYOPHILIZED, FOR SOLUTION INTRAVENOUS; SUBCUTANEOUS at 13:12

## 2021-07-20 ASSESSMENT — MIFFLIN-ST. JEOR: SCORE: 1242

## 2021-07-20 ASSESSMENT — PAIN SCALES - GENERAL: PAINLEVEL: NO PAIN (0)

## 2021-07-20 NOTE — PROGRESS NOTES
Patient is 70 years old, here today for injection of Velcade per order of . Patient meets parameters for today's infusion. Patient identified with two identifiers, order verified, and verbal consent for today's infusion obtained from patient. Written consent for treatment is on file and valid.    Peripheral labs ordered. Butterfly needle inserted into left antecubital.  Immediate blood return noted. 1 lab tube drawn. Needle removed, covered with sterile guaze and coban. Patient tolerated well, denies pain or discomfort at this time. Patient discharged.    Today's lab values: WBC: 4.6, HGB: 13.1, PLT: 217  ANC: 3.4.     Patient meets order parameters for today's treatment.    Patient denies questions or concerns regarding injection and/or medication(s) being administered.    Velcade injected SQ into right upper outer arm at a 45 degree angle per protocol rotating sites. Patient tolerated injection without incident, no signs or symptoms of adverse reaction noted. Patient denies pain or discomfort.     Covered with a sterile bandage. Pt instructed to leave bandage intact for a minimum of one hour, and to call with questions or concerns. Patient states understanding, discharged.

## 2021-07-23 ENCOUNTER — TELEPHONE (OUTPATIENT)
Dept: INFUSION THERAPY | Facility: OTHER | Age: 71
End: 2021-07-23

## 2021-07-23 ENCOUNTER — INFUSION THERAPY VISIT (OUTPATIENT)
Dept: INFUSION THERAPY | Facility: OTHER | Age: 71
End: 2021-07-23
Attending: INTERNAL MEDICINE
Payer: MEDICARE

## 2021-07-23 VITALS
TEMPERATURE: 99.4 F | SYSTOLIC BLOOD PRESSURE: 108 MMHG | HEART RATE: 91 BPM | RESPIRATION RATE: 18 BRPM | BODY MASS INDEX: 29.77 KG/M2 | WEIGHT: 167.99 LBS | HEIGHT: 63 IN | OXYGEN SATURATION: 95 % | DIASTOLIC BLOOD PRESSURE: 78 MMHG

## 2021-07-23 DIAGNOSIS — C90.00 MULTIPLE MYELOMA NOT HAVING ACHIEVED REMISSION (H): Primary | ICD-10-CM

## 2021-07-23 LAB
BASOPHILS # BLD AUTO: 0 10E3/UL (ref 0–0.2)
BASOPHILS NFR BLD AUTO: 1 %
EOSINOPHIL # BLD AUTO: 0.2 10E3/UL (ref 0–0.7)
EOSINOPHIL NFR BLD AUTO: 3 %
ERYTHROCYTE [DISTWIDTH] IN BLOOD BY AUTOMATED COUNT: 14.6 % (ref 10–15)
HCT VFR BLD AUTO: 38.8 % (ref 35–47)
HGB BLD-MCNC: 13.5 G/DL (ref 11.7–15.7)
IMM GRANULOCYTES # BLD: 0.1 10E3/UL
IMM GRANULOCYTES NFR BLD: 3 %
LYMPHOCYTES # BLD AUTO: 1.4 10E3/UL (ref 0.8–5.3)
LYMPHOCYTES NFR BLD AUTO: 27 %
MCH RBC QN AUTO: 31.6 PG (ref 26.5–33)
MCHC RBC AUTO-ENTMCNC: 34.8 G/DL (ref 31.5–36.5)
MCV RBC AUTO: 91 FL (ref 78–100)
MONOCYTES # BLD AUTO: 0.9 10E3/UL (ref 0–1.3)
MONOCYTES NFR BLD AUTO: 17 %
NEUTROPHILS # BLD AUTO: 2.7 10E3/UL (ref 1.6–8.3)
NEUTROPHILS NFR BLD AUTO: 49 %
NRBC # BLD AUTO: 0 10E3/UL
NRBC BLD AUTO-RTO: 0 /100
PLATELET # BLD AUTO: 167 10E3/UL (ref 150–450)
RBC # BLD AUTO: 4.27 10E6/UL (ref 3.8–5.2)
WBC # BLD AUTO: 5.4 10E3/UL (ref 4–11)

## 2021-07-23 PROCEDURE — 250N000011 HC RX IP 250 OP 636: Mod: JW | Performed by: NURSE PRACTITIONER

## 2021-07-23 PROCEDURE — 85025 COMPLETE CBC W/AUTO DIFF WBC: CPT | Mod: ZL

## 2021-07-23 PROCEDURE — 96401 CHEMO ANTI-NEOPL SQ/IM: CPT

## 2021-07-23 PROCEDURE — 36415 COLL VENOUS BLD VENIPUNCTURE: CPT | Mod: ZL

## 2021-07-23 RX ADMIN — BORTEZOMIB 2.4 MG: 3.5 INJECTION, POWDER, LYOPHILIZED, FOR SOLUTION INTRAVENOUS; SUBCUTANEOUS at 13:44

## 2021-07-23 ASSESSMENT — MIFFLIN-ST. JEOR: SCORE: 1241

## 2021-07-23 ASSESSMENT — PAIN SCALES - GENERAL: PAINLEVEL: NO PAIN (0)

## 2021-07-23 NOTE — PROGRESS NOTES
Patient is 70 years old, here today for injection of Velcade per order of . Patient meets parameters for today's infusion. Patient identified with two identifiers, order verified, and verbal consent for today's infusion obtained from patient. Written consent for treatment is on file and valid.    Today's lab values: WBC: 5.4, HGB: 13.5, PLT: 167  ANC: 2.7.     Patient meets order parameters for today's treatment.    Patient denies questions or concerns regarding injection and/or medication(s) being administered.    Independent dose check completed with UZMA Dumont.    Velcade injected SQ into left upper outer arm at a 45 degree angle per protocol rotating sites. Patient tolerated injection without incident, no signs or symptoms of adverse reaction noted. Patient denies pain or discomfort.     Covered with a sterile bandage. Pt instructed to leave bandage intact for a minimum of one hour, and to call with questions or concerns. Patient states understanding, discharged.

## 2021-07-23 NOTE — PATIENT INSTRUCTIONS
We will see you back as planned. If you have any questions or concerns, we can be reached Monday through Friday 8am - 430pm at [phone] ([facility]). If you have concerns related to a potential reaction/side effect after hours/weekends/holiday's, please seek emergent medical care.

## 2021-07-23 NOTE — Clinical Note
Routing to you as well as Bonita, I forgot she isn't here until 7-27-21, so Dr Walker may need to deal with this Monday if Bonita hasn't done so! Thanks!!!

## 2021-07-23 NOTE — PROGRESS NOTES
Patient on lab schedule for BIG labs next week. No labs in open orders. Note routed to ONC to please follow up prior to her Tuesday 7-27-21 appt.

## 2021-07-27 ENCOUNTER — LAB (OUTPATIENT)
Dept: LAB | Facility: OTHER | Age: 71
End: 2021-07-27
Attending: INTERNAL MEDICINE
Payer: MEDICARE

## 2021-07-27 DIAGNOSIS — C90.00 MULTIPLE MYELOMA NOT HAVING ACHIEVED REMISSION (H): ICD-10-CM

## 2021-07-27 PROCEDURE — 36415 COLL VENOUS BLD VENIPUNCTURE: CPT | Mod: ZL

## 2021-07-27 PROCEDURE — 84165 PROTEIN E-PHORESIS SERUM: CPT | Mod: ZL | Performed by: PATHOLOGY

## 2021-07-27 PROCEDURE — 82784 ASSAY IGA/IGD/IGG/IGM EACH: CPT | Mod: ZL

## 2021-07-27 PROCEDURE — 84155 ASSAY OF PROTEIN SERUM: CPT | Mod: ZL

## 2021-07-27 PROCEDURE — 83883 ASSAY NEPHELOMETRY NOT SPEC: CPT | Mod: ZL

## 2021-07-27 RX ORDER — ACYCLOVIR 400 MG/1
400 TABLET ORAL 2 TIMES DAILY
Qty: 60 TABLET | Refills: 3 | Status: SHIPPED | OUTPATIENT
Start: 2021-07-27 | End: 2021-11-29

## 2021-07-27 NOTE — TELEPHONE ENCOUNTER
Acyclovir 400 MG      Last Written Prescription Date:  10/19/20  Last Fill Quantity: 60,   # refills: 3  Last Office Visit: 04/19/21  Future Office visit:    Next 5 appointments (look out 90 days)    Aug 03, 2021  9:00 AM  (Arrive by 8:45 AM)  Return Visit with Barbie Quintana NP  [facility] ([facility] ) [address]  [place] [address]  [phone]   Aug 11, 2021  8:45 AM  (Arrive by 8:30 AM)  Return Visit with Bridgette Ly PA-C  [facility] ([facility] ) [address]  [place] [address]  [phone]   Aug 16, 2021  8:45 AM  (Arrive by 8:30 AM)  SHORT with VENKATESH Stevens  [facility] ([facility] ) [address]  [place] [address]  [phone]   Aug 23, 2021 10:00 AM  (Arrive by 9:45 AM)  Return Visit with Elías Walker MD  [facility] ([facility] ) [address]  [place] [address]  [phone]   Sep 14, 2021 12:45 PM  (Arrive by 12:30 PM)  Return Visit with Barbie Quintana NP  [facility] ([facility] ) [address]  [place] [address]  [phone]           Routing refill request to provider for review/approval because:  Phone call    Due to PCP being out of the office request being sent to oncology provider.

## 2021-07-28 LAB — TOTAL PROTEIN SERUM FOR ELP: 6.7 G/DL (ref 6.8–8.8)

## 2021-07-29 LAB
ALBUMIN SERPL ELPH-MCNC: 4.1 G/DL (ref 3.7–5.1)
ALPHA1 GLOB SERPL ELPH-MCNC: 0.4 G/DL (ref 0.2–0.4)
ALPHA2 GLOB SERPL ELPH-MCNC: 0.7 G/DL (ref 0.5–0.9)
B-GLOBULIN SERPL ELPH-MCNC: 0.6 G/DL (ref 0.6–1)
GAMMA GLOB SERPL ELPH-MCNC: 0.9 G/DL (ref 0.7–1.6)
KAPPA LC FREE SER-MCNC: 0.37 MG/DL (ref 0.33–1.94)
KAPPA LC FREE/LAMBDA FREE SER NEPH: 2.18 {RATIO} (ref 0.26–1.65)
LAMBDA LC FREE SERPL-MCNC: 0.17 MG/DL (ref 0.57–2.63)
M PROTEIN SERPL ELPH-MCNC: 0.4 G/DL
PROT PATTERN SERPL ELPH-IMP: ABNORMAL
PROT PATTERN SERPL IFE-IMP: NORMAL

## 2021-07-29 PROCEDURE — 84165 PROTEIN E-PHORESIS SERUM: CPT | Mod: 26 | Performed by: PATHOLOGY

## 2021-07-29 PROCEDURE — 86334 IMMUNOFIX E-PHORESIS SERUM: CPT | Mod: 26 | Performed by: PATHOLOGY

## 2021-08-03 ENCOUNTER — INFUSION THERAPY VISIT (OUTPATIENT)
Dept: INFUSION THERAPY | Facility: OTHER | Age: 71
End: 2021-08-03
Attending: INTERNAL MEDICINE
Payer: MEDICARE

## 2021-08-03 VITALS
RESPIRATION RATE: 18 BRPM | DIASTOLIC BLOOD PRESSURE: 63 MMHG | SYSTOLIC BLOOD PRESSURE: 108 MMHG | WEIGHT: 169.75 LBS | HEART RATE: 91 BPM | BODY MASS INDEX: 30.08 KG/M2 | OXYGEN SATURATION: 96 % | TEMPERATURE: 98.8 F

## 2021-08-03 DIAGNOSIS — C90.00 MULTIPLE MYELOMA NOT HAVING ACHIEVED REMISSION (H): Primary | ICD-10-CM

## 2021-08-03 LAB
ALBUMIN SERPL-MCNC: 3.4 G/DL (ref 3.4–5)
ALP SERPL-CCNC: 117 U/L (ref 40–150)
ALT SERPL W P-5'-P-CCNC: 20 U/L (ref 0–50)
ANION GAP SERPL CALCULATED.3IONS-SCNC: 7 MMOL/L (ref 3–14)
AST SERPL W P-5'-P-CCNC: 10 U/L (ref 0–45)
BASOPHILS # BLD AUTO: 0 10E3/UL (ref 0–0.2)
BASOPHILS NFR BLD AUTO: 1 %
BILIRUB SERPL-MCNC: 0.4 MG/DL (ref 0.2–1.3)
BUN SERPL-MCNC: 22 MG/DL (ref 7–30)
CALCIUM SERPL-MCNC: 8.7 MG/DL (ref 8.5–10.1)
CHLORIDE BLD-SCNC: 107 MMOL/L (ref 94–109)
CO2 SERPL-SCNC: 24 MMOL/L (ref 20–32)
CREAT SERPL-MCNC: 0.79 MG/DL (ref 0.52–1.04)
EOSINOPHIL # BLD AUTO: 0.2 10E3/UL (ref 0–0.7)
EOSINOPHIL NFR BLD AUTO: 4 %
ERYTHROCYTE [DISTWIDTH] IN BLOOD BY AUTOMATED COUNT: 14.8 % (ref 10–15)
GFR SERPL CREATININE-BSD FRML MDRD: 75 ML/MIN/1.73M2
GLUCOSE BLD-MCNC: 131 MG/DL (ref 70–99)
HCT VFR BLD AUTO: 37.1 % (ref 35–47)
HGB BLD-MCNC: 12.5 G/DL (ref 11.7–15.7)
IMM GRANULOCYTES # BLD: 0.1 10E3/UL
IMM GRANULOCYTES NFR BLD: 2 %
LYMPHOCYTES # BLD AUTO: 1.1 10E3/UL (ref 0.8–5.3)
LYMPHOCYTES NFR BLD AUTO: 18 %
MCH RBC QN AUTO: 30.9 PG (ref 26.5–33)
MCHC RBC AUTO-ENTMCNC: 33.7 G/DL (ref 31.5–36.5)
MCV RBC AUTO: 92 FL (ref 78–100)
MONOCYTES # BLD AUTO: 0.7 10E3/UL (ref 0–1.3)
MONOCYTES NFR BLD AUTO: 13 %
NEUTROPHILS # BLD AUTO: 3.6 10E3/UL (ref 1.6–8.3)
NEUTROPHILS NFR BLD AUTO: 62 %
NRBC # BLD AUTO: 0 10E3/UL
NRBC BLD AUTO-RTO: 0 /100
PLATELET # BLD AUTO: 261 10E3/UL (ref 150–450)
POTASSIUM BLD-SCNC: 3.9 MMOL/L (ref 3.4–5.3)
PROT SERPL-MCNC: 6.8 G/DL (ref 6.8–8.8)
RBC # BLD AUTO: 4.05 10E6/UL (ref 3.8–5.2)
SODIUM SERPL-SCNC: 138 MMOL/L (ref 133–144)
WBC # BLD AUTO: 5.7 10E3/UL (ref 4–11)

## 2021-08-03 PROCEDURE — 82040 ASSAY OF SERUM ALBUMIN: CPT | Mod: ZL | Performed by: INTERNAL MEDICINE

## 2021-08-03 PROCEDURE — 85025 COMPLETE CBC W/AUTO DIFF WBC: CPT | Mod: ZL | Performed by: INTERNAL MEDICINE

## 2021-08-03 PROCEDURE — 36415 COLL VENOUS BLD VENIPUNCTURE: CPT | Mod: ZL | Performed by: INTERNAL MEDICINE

## 2021-08-03 PROCEDURE — 96401 CHEMO ANTI-NEOPL SQ/IM: CPT

## 2021-08-03 PROCEDURE — 250N000011 HC RX IP 250 OP 636: Performed by: INTERNAL MEDICINE

## 2021-08-03 PROCEDURE — 250N000013 HC RX MED GY IP 250 OP 250 PS 637: Performed by: INTERNAL MEDICINE

## 2021-08-03 RX ORDER — ACETAMINOPHEN 325 MG/1
650 TABLET ORAL ONCE
Status: COMPLETED | OUTPATIENT
Start: 2021-08-03 | End: 2021-08-03

## 2021-08-03 RX ORDER — DIPHENHYDRAMINE HCL 50 MG
50 CAPSULE ORAL ONCE
Status: COMPLETED | OUTPATIENT
Start: 2021-08-03 | End: 2021-08-03

## 2021-08-03 RX ADMIN — ACETAMINOPHEN 650 MG: 325 TABLET, FILM COATED ORAL at 10:43

## 2021-08-03 RX ADMIN — DARATUMUMAB AND HYALURONIDASE-FIHJ (HUMAN RECOMBINANT) 1800 MG: 1800; 30000 INJECTION SUBCUTANEOUS at 12:02

## 2021-08-03 RX ADMIN — DIPHENHYDRAMINE HYDROCHLORIDE 50 MG: 50 CAPSULE ORAL at 10:43

## 2021-08-03 RX ADMIN — BORTEZOMIB 2.4 MG: 3.5 INJECTION, POWDER, LYOPHILIZED, FOR SOLUTION INTRAVENOUS; SUBCUTANEOUS at 11:56

## 2021-08-03 NOTE — PROGRESS NOTES
Patient is 70 year old, here today for injection of Velcade and Faspro per order of Dr Walker.    Labs meet parameters for today's injection.     Patient denies questions nor concerns regarding medication, administration site, side effects, nor aftercare.  Patient identified with two identifiers, order verified, and verbal consent for today's injection obtained from patient.     Patient education provided on s/s of injection site infection, and/or medication specific side effects, and when to call a provider.  Patient instructed to report any adverse effects.     Velcade administered per protocol in right upper outer arm at 45 degree angle.  Site covered with sterile bandage.  Patient tolerated injection well, no verbal nor non-verbal signs of discomfort noted.  No adverse effects noted at this time.     Faspro administered per protocol in right upper outer abdomen at 45 degree angle.  Site covered with sterile bandage.  Patient tolerated injection well, no verbal nor non-verbal signs of discomfort noted.  No adverse effects noted at this time.     Patient instructed to call with further questions or concerns.  Patient states understanding and is in agreement with this plan. Patient discharged.

## 2021-08-03 NOTE — PATIENT INSTRUCTIONS

## 2021-08-06 ENCOUNTER — INFUSION THERAPY VISIT (OUTPATIENT)
Dept: INFUSION THERAPY | Facility: OTHER | Age: 71
End: 2021-08-06
Attending: INTERNAL MEDICINE
Payer: MEDICARE

## 2021-08-06 VITALS
SYSTOLIC BLOOD PRESSURE: 121 MMHG | TEMPERATURE: 98.7 F | OXYGEN SATURATION: 96 % | DIASTOLIC BLOOD PRESSURE: 83 MMHG | HEIGHT: 63 IN | BODY MASS INDEX: 30.16 KG/M2 | RESPIRATION RATE: 16 BRPM | WEIGHT: 170.19 LBS | HEART RATE: 93 BPM

## 2021-08-06 DIAGNOSIS — C90.00 MULTIPLE MYELOMA NOT HAVING ACHIEVED REMISSION (H): Primary | ICD-10-CM

## 2021-08-06 PROCEDURE — 96401 CHEMO ANTI-NEOPL SQ/IM: CPT

## 2021-08-06 PROCEDURE — 250N000011 HC RX IP 250 OP 636: Mod: JW

## 2021-08-06 PROCEDURE — 250N000011 HC RX IP 250 OP 636: Performed by: INTERNAL MEDICINE

## 2021-08-06 RX ADMIN — BORTEZOMIB 2.4 MG: 3.5 INJECTION, POWDER, LYOPHILIZED, FOR SOLUTION INTRAVENOUS; SUBCUTANEOUS at 13:39

## 2021-08-06 ASSESSMENT — MIFFLIN-ST. JEOR: SCORE: 1251.13

## 2021-08-06 ASSESSMENT — PAIN SCALES - GENERAL: PAINLEVEL: NO PAIN (0)

## 2021-08-06 NOTE — PROGRESS NOTES
Patient is a 70 here today for injection of Velcade per order of . Patient meets parameters for today's infusion. Patient identified with two identifiers, order verified, and verbal consent for today's infusion obtained from patient. Written consent for treatment is on file and valid.    Patient denies questions or concerns regarding injection and/or medication(s) being administered.  Velcade injected SQ into left upper outer arm at 45 degree angle per protocol rotating sites.     Injection administered per protocol. Patient tolerated infusion without incident, no signs or symptoms of adverse reaction noted. Patient denies pain or discomfort.     Covered with a sterile bandage. Pt instructed to leave bandage intact for a minimum of one hour, and to call with questions or concerns. Copy of appointments, discharge instructions, and after visit summary (AVS) provided to patient. Patient states understanding, discharged ambulatory.

## 2021-08-10 ENCOUNTER — INFUSION THERAPY VISIT (OUTPATIENT)
Dept: INFUSION THERAPY | Facility: OTHER | Age: 71
End: 2021-08-10
Attending: INTERNAL MEDICINE
Payer: MEDICARE

## 2021-08-10 VITALS
BODY MASS INDEX: 30.31 KG/M2 | OXYGEN SATURATION: 96 % | TEMPERATURE: 98.6 F | WEIGHT: 171.08 LBS | HEIGHT: 63 IN | DIASTOLIC BLOOD PRESSURE: 72 MMHG | HEART RATE: 86 BPM | RESPIRATION RATE: 16 BRPM | SYSTOLIC BLOOD PRESSURE: 107 MMHG

## 2021-08-10 DIAGNOSIS — F33.0 MAJOR DEPRESSIVE DISORDER, RECURRENT EPISODE, MILD (H): ICD-10-CM

## 2021-08-10 DIAGNOSIS — C90.00 MULTIPLE MYELOMA NOT HAVING ACHIEVED REMISSION (H): Primary | ICD-10-CM

## 2021-08-10 LAB
BASOPHILS # BLD AUTO: 0 10E3/UL (ref 0–0.2)
BASOPHILS NFR BLD AUTO: 0 %
EOSINOPHIL # BLD AUTO: 0.3 10E3/UL (ref 0–0.7)
EOSINOPHIL NFR BLD AUTO: 6 %
ERYTHROCYTE [DISTWIDTH] IN BLOOD BY AUTOMATED COUNT: 14.8 % (ref 10–15)
HCT VFR BLD AUTO: 38.2 % (ref 35–47)
HGB BLD-MCNC: 13 G/DL (ref 11.7–15.7)
IMM GRANULOCYTES # BLD: 0.1 10E3/UL
IMM GRANULOCYTES NFR BLD: 1 %
LYMPHOCYTES # BLD AUTO: 1.2 10E3/UL (ref 0.8–5.3)
LYMPHOCYTES NFR BLD AUTO: 21 %
MCH RBC QN AUTO: 31.3 PG (ref 26.5–33)
MCHC RBC AUTO-ENTMCNC: 34 G/DL (ref 31.5–36.5)
MCV RBC AUTO: 92 FL (ref 78–100)
MONOCYTES # BLD AUTO: 1.2 10E3/UL (ref 0–1.3)
MONOCYTES NFR BLD AUTO: 22 %
NEUTROPHILS # BLD AUTO: 2.9 10E3/UL (ref 1.6–8.3)
NEUTROPHILS NFR BLD AUTO: 50 %
NRBC # BLD AUTO: 0 10E3/UL
NRBC BLD AUTO-RTO: 0 /100
PLATELET # BLD AUTO: 226 10E3/UL (ref 150–450)
RBC # BLD AUTO: 4.15 10E6/UL (ref 3.8–5.2)
WBC # BLD AUTO: 5.7 10E3/UL (ref 4–11)

## 2021-08-10 PROCEDURE — 96401 CHEMO ANTI-NEOPL SQ/IM: CPT

## 2021-08-10 PROCEDURE — 36415 COLL VENOUS BLD VENIPUNCTURE: CPT | Mod: ZL | Performed by: INTERNAL MEDICINE

## 2021-08-10 PROCEDURE — 85025 COMPLETE CBC W/AUTO DIFF WBC: CPT | Mod: ZL | Performed by: INTERNAL MEDICINE

## 2021-08-10 PROCEDURE — 250N000011 HC RX IP 250 OP 636: Performed by: INTERNAL MEDICINE

## 2021-08-10 RX ADMIN — BORTEZOMIB 2.4 MG: 3.5 INJECTION, POWDER, LYOPHILIZED, FOR SOLUTION INTRAVENOUS; SUBCUTANEOUS at 14:33

## 2021-08-10 ASSESSMENT — PAIN SCALES - GENERAL: PAINLEVEL: NO PAIN (0)

## 2021-08-10 ASSESSMENT — MIFFLIN-ST. JEOR: SCORE: 1255.13

## 2021-08-10 NOTE — PROGRESS NOTES
Patient is a 70 year old female here today for injection of Velcade per order of . Patient meets parameters for today's infusion. Patient identified with two identifiers, order verified, and verbal consent for today's infusion obtained from patient. Written consent for treatment is on file and valid.    Patient denies questions or concerns regarding injection and/or medication(s) being administered.  Velcade injected SQ into right upper outer arm at 45 degree angle per protocol rotating sites.     Injection administered per protocol. Patient tolerated infusion without incident, no signs or symptoms of adverse reaction noted. Patient denies pain or discomfort.     Covered with a sterile bandage. Pt instructed to leave bandage intact for a minimum of one hour, and to call with questions or concerns. Copy of appointments, discharge instructions, and after visit summary (AVS) provided to patient. Patient states understanding, discharged ambulatory.

## 2021-08-10 NOTE — PROGRESS NOTES
Chief Complaint   Patient presents with     Ear Problem     Pt is here for a f/u left TM perforation.         Patient returns to ENT for recheck of Left TM perforation.   She was last seen on 2/11/21 for Left TM perforation. Right ear remained stable without an effusion. Recommended observation at this time and water precautions.      Today, she returns for exam and audiogram. She has been doing well.  She has no recent otalgia, otorrhea. She has been using her aids without concerns. No recent ear infections.   Hearing has remained stable.   She has been in chemo twice a week. Reports no blanka concerns.      Denies otalgia, otorrhea  Denies worrisome tinnitus  Denies fluctuating hearing loss or tinnitus.   Denies vertigo or facial paraesthesia.         Audiogram- 2/11/21  Tympanograms  Type A Right   Type B, large volume left.    Thresholds are improved right 250 Hz sloping to stable mild to severe SNHL and  Left ear decreased moderate to severe mixed loss.   SRT=PTA  WRS-  Right- 100%@70dB  Left- 88% @70 dB     BTTI placed on 9/20/19 by Dr. Ley in office      Past Medical History:   Diagnosis Date     Arthritis      Depressive disorder      Diabetes mellitus, type 2 (H) 1/18/2021     Essential hypertension 10/1/2015     Major depressive disorder, recurrent episode, mild (H) 10/1/2015     Mixed hyperlipidemia 10/1/2015     Multiple myeloma not having achieved remission (H) 6/24/2019     Other specified disorders of bladder 07/09/2012    Irritable Bladder     Seasonal allergies 10/1/2015     Unspecified essential hypertension 03/19/2007     Unspecified sinusitis (chronic) 09/05/2007        Allergies   Allergen Reactions     Lisinopril Cough     Phenylephrine Hcl Other (See Comments)     **Entex  HEADACHE (SEVERE)     Phenylpropanolamine Other (See Comments)     **Entex  HEADACHE (SEVERE)     Pseudoephedrine Tannate Other (See Comments)     **Entex  HEADACHE (SEVERE)     Levofloxacin Rash     **Levaquin      "Moxifloxacin Hcl [Avelox] Rash     Current Outpatient Medications   Medication     acyclovir (ZOVIRAX) 400 MG tablet     aspirin (ASA) 81 MG chewable tablet     atorvastatin (LIPITOR) 10 MG tablet     dexamethasone (DECADRON) 4 MG tablet     EPINEPHrine (EPIPEN) 0.3 MG/0.3ML injection     fluticasone (FLONASE) 50 MCG/ACT nasal spray     losartan (COZAAR) 50 MG tablet     prochlorperazine (COMPAZINE) 10 MG tablet     sertraline (ZOLOFT) 50 MG tablet     No current facility-administered medications for this visit.      ROS: 10 point ROS neg other than the symptoms noted above in the HPI.  /68 (Cuff Size: Adult Regular)   Pulse 80   Temp 97.7  F (36.5  C) (Tympanic)   Ht 1.6 m (5' 3\")   Wt 77.6 kg (171 lb 1.2 oz)   SpO2 96%   BMI 30.30 kg/m      General - The patient is well nourished and well developed, and appears to have good nutritional status.    Head and Face - Normocephalic and atraumatic, with no gross asymmetry noted of the contour of the facial features.  The facial nerve is intact, with strong symmetric movements.  Eyes - Extraocular movements intact, and the pupils were reactive to light.  Sclera were not icteric or injected, conjunctiva were pink and moist.  Mouth - Examination of the oral cavity shows pink, healthy, moist mucosa.  No lesions or ulceration noted.  The dentition are in good repair.  The tongue is mobile and midline.  Ears - Examination of the ears showed- Right canal clear. Right TM is intact without effusion or perforation. Ears examined under otologic microscopy. Canal cleaned with cupped forceps.   Left TM is with perforation, central anterior about 15%. ME appears healthy and dry.   Eyes - Extraocular movements intact, and the pupils were reactive to light.  Sclera were not icteric or injected, conjunctiva were pink and moist.  Mouth - Examination of the oral cavity showed pink, healthy oral mucosa. No lesions or ulcerations noted.  The tongue was mobile and midline, and the " dentition were in good condition.    Throat - The walls of the oropharynx were smooth, pink, moist, symmetric, and had no lesions or ulcerations.  The tonsillar pillars and soft palate were symmetric.  The uvula was midline on elevation.    Neck - Normal midline excursion of the laryngotracheal complex during swallowing.  Full range of motion on passive movement.  Palpation of the occipital, submental, submandibular, internal jugular chain, and supraclavicular nodes did not demonstrate any abnormal lymph nodes or masses.  Palpation of the thyroid was soft and smooth, with no nodules or goiter appreciated.  The trachea was mobile and midline.    ASSESSMENT:    ICD-10-CM    1. Perforation of tympanic membrane, left  H72.92    2. Sensorineural hearing loss (SNHL) of right ear with restricted hearing of left ear  H90.A21    3. Mixed conductive and sensorineural hearing loss of left ear with restricted hearing of right ear  H90.A32    4. Multiple myeloma not having achieved remission (H)  C90.00    5. H/O myringotomy w/ tube placement  Z98.890          Ears appear stable at this time.   At this time, will monitor perforation every 6 months.   Maintain water precautions.   Follow up if there is drainage or new ear complaints.         She may continue with observation vs. Surgical repair at this juncture. Discussed surgery and expected outcomes. Observation and HAC reviewed.    Dry ear precautions are necessary during this time period.  If it does not heal, a tympanoplasty is usually recommended.  This, when successful, may improve hearing, diminish contamination from water exposure and therefore infection and also decrease the likelihood of cholesteatoma.  The risks and benefits of a tympanoplasty were described  along with necessary pre-operative precautions.   A brochure was shared with the patient today regarding the perforated eardrum.                 Bridgette Ly PA-C  ENT  [facility]

## 2021-08-11 ENCOUNTER — OFFICE VISIT (OUTPATIENT)
Dept: OTOLARYNGOLOGY | Facility: OTHER | Age: 71
End: 2021-08-11
Attending: PHYSICIAN ASSISTANT
Payer: COMMERCIAL

## 2021-08-11 VITALS
OXYGEN SATURATION: 96 % | HEIGHT: 63 IN | SYSTOLIC BLOOD PRESSURE: 108 MMHG | TEMPERATURE: 97.7 F | HEART RATE: 80 BPM | BODY MASS INDEX: 30.31 KG/M2 | DIASTOLIC BLOOD PRESSURE: 68 MMHG | WEIGHT: 171.07 LBS

## 2021-08-11 DIAGNOSIS — C90.00 MULTIPLE MYELOMA NOT HAVING ACHIEVED REMISSION (H): ICD-10-CM

## 2021-08-11 DIAGNOSIS — H72.92 PERFORATION OF TYMPANIC MEMBRANE, LEFT: Primary | ICD-10-CM

## 2021-08-11 DIAGNOSIS — Z98.890 H/O MYRINGOTOMY: ICD-10-CM

## 2021-08-11 DIAGNOSIS — H90.A32 MIXED CONDUCTIVE AND SENSORINEURAL HEARING LOSS OF LEFT EAR WITH RESTRICTED HEARING OF RIGHT EAR: ICD-10-CM

## 2021-08-11 DIAGNOSIS — H90.A21 SENSORINEURAL HEARING LOSS (SNHL) OF RIGHT EAR WITH RESTRICTED HEARING OF LEFT EAR: ICD-10-CM

## 2021-08-11 PROCEDURE — 92504 EAR MICROSCOPY EXAMINATION: CPT | Performed by: PHYSICIAN ASSISTANT

## 2021-08-11 PROCEDURE — G0463 HOSPITAL OUTPT CLINIC VISIT: HCPCS

## 2021-08-11 PROCEDURE — 92504 EAR MICROSCOPY EXAMINATION: CPT

## 2021-08-11 PROCEDURE — 99213 OFFICE O/P EST LOW 20 MIN: CPT | Mod: 25 | Performed by: PHYSICIAN ASSISTANT

## 2021-08-11 ASSESSMENT — PAIN SCALES - GENERAL: PAINLEVEL: NO PAIN (0)

## 2021-08-11 ASSESSMENT — MIFFLIN-ST. JEOR: SCORE: 1255.12

## 2021-08-11 NOTE — LETTER
8/11/2021         RE: Carmelita Watts  [address] [facility] [address]        Dear Colleague,    Thank you for referring your patient, Carmelita Watts, to the [facility]. Please see a copy of my visit note below.    Chief Complaint   Patient presents with     Ear Problem     Pt is here for a f/u left TM perforation.         Patient returns to ENT for recheck of Left TM perforation.   She was last seen on 2/11/21 for Left TM perforation. Right ear remained stable without an effusion. Recommended observation at this time and water precautions.      Today, she returns for exam and audiogram. She has been doing well.  She has no recent otalgia, otorrhea. She has been using her aids without concerns. No recent ear infections.   Hearing has remained stable.   She has been in chemo twice a week. Reports no blanka concerns.      Denies otalgia, otorrhea  Denies worrisome tinnitus  Denies fluctuating hearing loss or tinnitus.   Denies vertigo or facial paraesthesia.         Audiogram- 2/11/21  Tympanograms  Type A Right   Type B, large volume left.    Thresholds are improved right 250 Hz sloping to stable mild to severe SNHL and  Left ear decreased moderate to severe mixed loss.   SRT=PTA  WRS-  Right- 100%@70dB  Left- 88% @70 dB     BTTI placed on 9/20/19 by Dr. Ley in office      Past Medical History:   Diagnosis Date     Arthritis      Depressive disorder      Diabetes mellitus, type 2 (H) 1/18/2021     Essential hypertension 10/1/2015     Major depressive disorder, recurrent episode, mild (H) 10/1/2015     Mixed hyperlipidemia 10/1/2015     Multiple myeloma not having achieved remission (H) 6/24/2019     Other specified disorders of bladder 07/09/2012    Irritable Bladder     Seasonal allergies 10/1/2015     Unspecified essential hypertension 03/19/2007     Unspecified sinusitis (chronic) 09/05/2007        Allergies   Allergen Reactions     Lisinopril Cough     Phenylephrine Hcl Other  "(See Comments)     **Entex  HEADACHE (SEVERE)     Phenylpropanolamine Other (See Comments)     **Entex  HEADACHE (SEVERE)     Pseudoephedrine Tannate Other (See Comments)     **Entex  HEADACHE (SEVERE)     Levofloxacin Rash     **Levaquin     Moxifloxacin Hcl [Avelox] Rash     Current Outpatient Medications   Medication     acyclovir (ZOVIRAX) 400 MG tablet     aspirin (ASA) 81 MG chewable tablet     atorvastatin (LIPITOR) 10 MG tablet     dexamethasone (DECADRON) 4 MG tablet     EPINEPHrine (EPIPEN) 0.3 MG/0.3ML injection     fluticasone (FLONASE) 50 MCG/ACT nasal spray     losartan (COZAAR) 50 MG tablet     prochlorperazine (COMPAZINE) 10 MG tablet     sertraline (ZOLOFT) 50 MG tablet     No current facility-administered medications for this visit.      ROS: 10 point ROS neg other than the symptoms noted above in the HPI.  /68 (Cuff Size: Adult Regular)   Pulse 80   Temp 97.7  F (36.5  C) (Tympanic)   Ht 1.6 m (5' 3\")   Wt 77.6 kg (171 lb 1.2 oz)   SpO2 96%   BMI 30.30 kg/m      General - The patient is well nourished and well developed, and appears to have good nutritional status.    Head and Face - Normocephalic and atraumatic, with no gross asymmetry noted of the contour of the facial features.  The facial nerve is intact, with strong symmetric movements.  Eyes - Extraocular movements intact, and the pupils were reactive to light.  Sclera were not icteric or injected, conjunctiva were pink and moist.  Mouth - Examination of the oral cavity shows pink, healthy, moist mucosa.  No lesions or ulceration noted.  The dentition are in good repair.  The tongue is mobile and midline.  Ears - Examination of the ears showed- Right canal clear. Right TM is intact without effusion or perforation. Ears examined under otologic microscopy. Canal cleaned with cupped forceps.   Left TM is with perforation, central anterior about 15%. ME appears healthy and dry.   Eyes - Extraocular movements intact, and the pupils " were reactive to light.  Sclera were not icteric or injected, conjunctiva were pink and moist.  Mouth - Examination of the oral cavity showed pink, healthy oral mucosa. No lesions or ulcerations noted.  The tongue was mobile and midline, and the dentition were in good condition.    Throat - The walls of the oropharynx were smooth, pink, moist, symmetric, and had no lesions or ulcerations.  The tonsillar pillars and soft palate were symmetric.  The uvula was midline on elevation.    Neck - Normal midline excursion of the laryngotracheal complex during swallowing.  Full range of motion on passive movement.  Palpation of the occipital, submental, submandibular, internal jugular chain, and supraclavicular nodes did not demonstrate any abnormal lymph nodes or masses.  Palpation of the thyroid was soft and smooth, with no nodules or goiter appreciated.  The trachea was mobile and midline.    ASSESSMENT:    ICD-10-CM    1. Perforation of tympanic membrane, left  H72.92    2. Sensorineural hearing loss (SNHL) of right ear with restricted hearing of left ear  H90.A21    3. Mixed conductive and sensorineural hearing loss of left ear with restricted hearing of right ear  H90.A32    4. Multiple myeloma not having achieved remission (H)  C90.00    5. H/O myringotomy w/ tube placement  Z98.890          Ears appear stable at this time.   At this time, will monitor perforation every 6 months.   Maintain water precautions.   Follow up if there is drainage or new ear complaints.         She may continue with observation vs. Surgical repair at this juncture. Discussed surgery and expected outcomes. Observation and HAC reviewed.    Dry ear precautions are necessary during this time period.  If it does not heal, a tympanoplasty is usually recommended.  This, when successful, may improve hearing, diminish contamination from water exposure and therefore infection and also decrease the likelihood of cholesteatoma.  The risks and benefits  of a tympanoplasty were described  along with necessary pre-operative precautions.   A brochure was shared with the patient today regarding the perforated eardrum.                 Bridgette Ly PA-C  ENT  [facility], [place]          Again, thank you for allowing me to participate in the care of your patient.        Sincerely,        Bridgette Ly PA-C

## 2021-08-11 NOTE — NURSING NOTE
"Chief Complaint   Patient presents with     Ear Problem     Pt is here for a f/u left TM perforation.       Initial /68 (Cuff Size: Adult Regular)   Pulse 80   Temp 97.7  F (36.5  C) (Tympanic)   Ht 1.6 m (5' 3\")   Wt 77.6 kg (171 lb 1.2 oz)   SpO2 96%   BMI 30.30 kg/m   Estimated body mass index is 30.3 kg/m  as calculated from the following:    Height as of this encounter: 1.6 m (5' 3\").    Weight as of this encounter: 77.6 kg (171 lb 1.2 oz).  Medication Reconciliation: complete  Elizabeth Frances LPN    "

## 2021-08-11 NOTE — PATIENT INSTRUCTIONS
Ears were cleaned.   Right ear appears well.   Left ear perforation remains.   Water precautions with left ear.   Follow up in 6 months for ear check.       Thank you for allowing Bridgette Ly PA-C and our ENT team to participate in your care.  If your medications are too expensive, please give the nurse a call.  We can possibly change this medication.  If you have a scheduling or an appointment question please contact our Health Unit Coordinator at [phone], Ext. [phone].    ALL nursing questions or concerns can be directed to your ENT nurse at: [phone] [place]

## 2021-08-13 ENCOUNTER — INFUSION THERAPY VISIT (OUTPATIENT)
Dept: INFUSION THERAPY | Facility: OTHER | Age: 71
End: 2021-08-13
Attending: INTERNAL MEDICINE
Payer: MEDICARE

## 2021-08-13 VITALS
WEIGHT: 169.53 LBS | DIASTOLIC BLOOD PRESSURE: 79 MMHG | BODY MASS INDEX: 30.04 KG/M2 | HEIGHT: 63 IN | TEMPERATURE: 98.8 F | SYSTOLIC BLOOD PRESSURE: 132 MMHG | HEART RATE: 86 BPM | OXYGEN SATURATION: 96 % | RESPIRATION RATE: 16 BRPM

## 2021-08-13 DIAGNOSIS — C90.00 MULTIPLE MYELOMA NOT HAVING ACHIEVED REMISSION (H): Primary | ICD-10-CM

## 2021-08-13 LAB
BASOPHILS # BLD AUTO: 0 10E3/UL (ref 0–0.2)
BASOPHILS NFR BLD AUTO: 1 %
EOSINOPHIL # BLD AUTO: 0.3 10E3/UL (ref 0–0.7)
EOSINOPHIL NFR BLD AUTO: 6 %
ERYTHROCYTE [DISTWIDTH] IN BLOOD BY AUTOMATED COUNT: 14.4 % (ref 10–15)
HCT VFR BLD AUTO: 39.4 % (ref 35–47)
HGB BLD-MCNC: 13.6 G/DL (ref 11.7–15.7)
IMM GRANULOCYTES # BLD: 0.2 10E3/UL
IMM GRANULOCYTES NFR BLD: 4 %
LYMPHOCYTES # BLD AUTO: 1.3 10E3/UL (ref 0.8–5.3)
LYMPHOCYTES NFR BLD AUTO: 22 %
MCH RBC QN AUTO: 31.4 PG (ref 26.5–33)
MCHC RBC AUTO-ENTMCNC: 34.5 G/DL (ref 31.5–36.5)
MCV RBC AUTO: 91 FL (ref 78–100)
MONOCYTES # BLD AUTO: 1 10E3/UL (ref 0–1.3)
MONOCYTES NFR BLD AUTO: 17 %
NEUTROPHILS # BLD AUTO: 2.9 10E3/UL (ref 1.6–8.3)
NEUTROPHILS NFR BLD AUTO: 50 %
NRBC # BLD AUTO: 0 10E3/UL
NRBC BLD AUTO-RTO: 0 /100
PLATELET # BLD AUTO: 178 10E3/UL (ref 150–450)
RBC # BLD AUTO: 4.33 10E6/UL (ref 3.8–5.2)
WBC # BLD AUTO: 5.8 10E3/UL (ref 4–11)

## 2021-08-13 PROCEDURE — 36415 COLL VENOUS BLD VENIPUNCTURE: CPT | Mod: ZL

## 2021-08-13 PROCEDURE — 96401 CHEMO ANTI-NEOPL SQ/IM: CPT

## 2021-08-13 PROCEDURE — 85004 AUTOMATED DIFF WBC COUNT: CPT | Mod: ZL

## 2021-08-13 PROCEDURE — 250N000011 HC RX IP 250 OP 636: Performed by: INTERNAL MEDICINE

## 2021-08-13 RX ORDER — DEXAMETHASONE 4 MG/1
TABLET ORAL
Qty: 20 TABLET | Refills: 4 | Status: SHIPPED | OUTPATIENT
Start: 2021-08-13 | End: 2021-12-27

## 2021-08-13 RX ADMIN — BORTEZOMIB 2.4 MG: 3.5 INJECTION, POWDER, LYOPHILIZED, FOR SOLUTION INTRAVENOUS; SUBCUTANEOUS at 13:31

## 2021-08-13 ASSESSMENT — PAIN SCALES - GENERAL: PAINLEVEL: NO PAIN (0)

## 2021-08-13 ASSESSMENT — MIFFLIN-ST. JEOR: SCORE: 1248.13

## 2021-08-13 NOTE — PROGRESS NOTES
Subjective     Carmelita Watts is a 70 year old female who presents to clinic today for the following health issues:    HPI         Diabetes Follow-up       How often are you checking your blood sugar? Not at all    What concerns do you have today about your diabetes? None     Do you have any of these symptoms? (Select all that apply)  No numbness or tingling in feet.  No redness, sores or blisters on feet.  No complaints of excessive thirst.  No reports of blurry vision.  No significant changes to weight.    Have you had a diabetic eye exam in the last 12 months? Yes- Date of last eye exam: 6/1/2021,  Location: [facility]          Hyperlipidemia Follow-Up      Are you regularly taking any medication or supplement to lower your cholesterol?   Yes- lipitor    Are you having muscle aches or other side effects that you think could be caused by your cholesterol lowering medication?  No    Hypertension Follow-up      Do you check your blood pressure regularly outside of the clinic? No     Are you following a low salt diet? No    Are your blood pressures ever more than 140 on the top number (systolic) OR more   than 90 on the bottom number (diastolic), for example 140/90? No    BP Readings from Last 2 Encounters:   08/16/21 130/80   08/13/21 132/79     Hemoglobin A1C (%)   Date Value   04/27/2021 6.4 (H)   01/26/2021 6.3 (H)     LDL Cholesterol Calculated (mg/dL)   Date Value   04/27/2021 137 (H)   02/14/2020 125 (H)       Depression and Anxiety Follow-Up    How are you doing with your depression since your last visit? No change    How are you doing with your anxiety since your last visit?  No change    Are you having other symptoms that might be associated with depression or anxiety? No    Have you had a significant life event? Health Concerns     Do you have any concerns with your use of alcohol or other drugs? No    Social History     Tobacco Use     Smoking status: Never Smoker     Smokeless tobacco: Never Used      Tobacco comment: Tried to Quit (YES); QUIT in 1971; Passive Exposure (NO)   Substance Use Topics     Alcohol use: Yes     Comment: RARELY     Drug use: No     PHQ 10/16/2020 4/19/2021 8/16/2021   PHQ-9 Total Score 4 3 0   Q9: Thoughts of better off dead/self-harm past 2 weeks Not at all Not at all Not at all     MINAL-7 SCORE 5/27/2020 10/16/2020 8/16/2021   Total Score 0 1 0     Last PHQ-9 8/16/2021   1.  Little interest or pleasure in doing things 0   2.  Feeling down, depressed, or hopeless 0   3.  Trouble falling or staying asleep, or sleeping too much 0   4.  Feeling tired or having little energy 0   5.  Poor appetite or overeating 0   6.  Feeling bad about yourself 0   7.  Trouble concentrating 0   8.  Moving slowly or restless 0   Q9: Thoughts of better off dead/self-harm past 2 weeks 0   PHQ-9 Total Score 0   Difficulty at work, home, or with people -     MINAL-7  8/16/2021   1. Feeling nervous, anxious, or on edge 0   2. Not being able to stop or control worrying 0   3. Worrying too much about different things 0   4. Trouble relaxing 0   5. Being so restless that it is hard to sit still 0   6. Becoming easily annoyed or irritable 0   7. Feeling afraid, as if something awful might happen 0   MINAL-7 Total Score 0   If you checked any problems, how difficult have they made it for you to do your work, take care of things at home, or get along with other people? -       Suicide Assessment Five-step Evaluation and Treatment (SAFE-T)            Patient Active Problem List   Diagnosis     Hyperlipidemia LDL goal <130     Hypertension goal BP (blood pressure) < 140/80     Advanced care planning/counseling discussion     Major depressive disorder, recurrent episode, mild (H)     Seasonal allergies     Mixed hyperlipidemia     ACP (advance care planning)     Multiple myeloma not having achieved remission (H)     Diabetes mellitus, type 2 (H)     Past Surgical History:   Procedure Laterality Date     APPENDECTOMY        "BONE MARROW BIOPSY, BONE SPECIMEN, NEEDLE/TROCAR N/A 6/18/2019    Procedure: BONE MARROW BIOPSY;  Surgeon: Maciej Sanz MD;  Location: [facility]     CHOLECYSTECTOMY       COLONOSCOPY  07-    repeat 10 years     COLONOSCOPY N/A 12/30/2016    Procedure: COLONOSCOPY;  Surgeon: Bhaskar Franklin DO;  Location: [facility]     SINUS SURGERY       TUBAL STERILIZATION         Social History     Tobacco Use     Smoking status: Never Smoker     Smokeless tobacco: Never Used     Tobacco comment: Tried to Quit (YES); QUIT in 1971; Passive Exposure (NO)   Substance Use Topics     Alcohol use: Yes     Comment: RARELY     Family History   Problem Relation Age of Onset     Breast Cancer Mother 66        Cause of Death     Parkinsonism Father         \"Possible\"     Coronary Artery Disease Father      Pacemaker Father      Thyroid Disease Daughter      Diabetes No family hx of      Hypertension No family hx of      Hyperlipidemia No family hx of      Cerebrovascular Disease No family hx of      Colon Cancer No family hx of      Prostate Cancer No family hx of      Genetic Disorder No family hx of      Asthma No family hx of      Anesthesia Reaction No family hx of          Current Outpatient Medications   Medication Sig Dispense Refill     acyclovir (ZOVIRAX) 400 MG tablet Take 1 tablet (400 mg) by mouth 2 times daily Viral Prophylaxis. 60 tablet 3     aspirin (ASA) 81 MG chewable tablet Take 81 mg by mouth daily       atorvastatin (LIPITOR) 10 MG tablet Take 1 tablet (10 mg) by mouth daily 90 tablet 3     dexamethasone (DECADRON) 4 MG tablet Take 5 tablets every Tuesday 20 tablet 4     EPINEPHrine (EPIPEN) 0.3 MG/0.3ML injection Inject 0.3 mg into the muscle once as needed Use as needed for anaphylaxis.       fluticasone (FLONASE) 50 MCG/ACT nasal spray SPRAY 2 SPRAYS INTO BOTH NOSTRILS DAILY 48 mL 0     losartan (COZAAR) 50 MG tablet Take 1 tablet (50 mg) by mouth daily 90 tablet 3     prochlorperazine (COMPAZINE) 10 MG " tablet Take 1 tablet (10 mg) by mouth every 6 hours as needed (Nausea/Vomiting) 30 tablet 3     sertraline (ZOLOFT) 50 MG tablet TAKE 1 TABLET(50 MG) BY MOUTH DAILY 45 tablet 3     Allergies   Allergen Reactions     Lisinopril Cough     Phenylephrine Hcl Other (See Comments)     **Entex  HEADACHE (SEVERE)     Phenylpropanolamine Other (See Comments)     **Entex  HEADACHE (SEVERE)     Pseudoephedrine Tannate Other (See Comments)     **Entex  HEADACHE (SEVERE)     Levofloxacin Rash     **Levaquin     Moxifloxacin Hcl [Avelox] Rash     Recent Labs   Lab Test 08/03/21  0958 07/13/21  1335 07/06/21  1244 06/22/21  1050 04/27/21  1228 01/26/21  1002 10/19/20  1125 02/14/20  0947 01/03/19  1043 11/17/17  0844   A1C  --   --   --   --  6.4* 6.3* 6.5*  --   --   --    LDL  --   --   --   --  137*  --   --  125* 86 108*   HDL  --   --   --   --  49*  --   --  44* 42* 60   TRIG  --   --   --   --  171*  --   --  270* 126 78   ALT 20 19 17 18  --  20  --   --   --  27   CR 0.79 0.71 0.76 0.70  --  0.67  --   --   --  0.69   GFRESTIMATED 75 85 78 86  --  88  --   --   --  84   GFRESTBLACK  --   --  >90 >90  --  >90  --   --   --  >90   POTASSIUM 3.9 3.8 4.2 4.3  --  3.9  --   --   --  4.1   TSH  --   --   --   --   --   --   --  1.86  --  1.77      BP Readings from Last 3 Encounters:   08/16/21 130/80   08/13/21 132/79   08/11/21 108/68    Wt Readings from Last 3 Encounters:   08/16/21 77.7 kg (171 lb 6.4 oz)   08/13/21 76.9 kg (169 lb 8.5 oz)   08/11/21 77.6 kg (171 lb 1.2 oz)                    Reviewed and updated as needed this visit by Provider                 Review of Systems   CONSTITUTIONAL: NEGATIVE for fever, chills, change in weight  INTEGUMENTARY/SKIN: large warm skin reaction to Veli geni in her right arm.    ENT/MOUTH: NEGATIVE for ear, mouth and throat problems  RESP: NEGATIVE for significant cough or SOB  CV: NEGATIVE for chest pain, palpitations or peripheral edema  PSYCHIATRIC: NEGATIVE for changes in mood or  "affect, fatigue and weight gain      Objective    /80 (BP Location: Left arm, Patient Position: Sitting, Cuff Size: Adult Regular)   Pulse 78   Temp 97.8  F (36.6  C) (Tympanic)   Ht 1.6 m (5' 3\")   Wt 77.7 kg (171 lb 6.4 oz)   SpO2 96%   BMI 30.36 kg/m    Body mass index is 30.36 kg/m .  Physical Exam   GENERAL: healthy, alert and no distress  EYES: Eyes grossly normal to inspection, PERRL and conjunctivae and sclerae normal  HENT: ear canals and TM's normal, nose and mouth without ulcers or lesions  NECK: no adenopathy, no asymmetry, masses, or scars and thyroid normal to palpation  RESP: lungs clear to auscultation - no rales, rhonchi or wheezes  CV: regular rate and rhythm, normal S1 S2, no S3 or S4, no murmur, click or rub, no peripheral edema and peripheral pulses strong  ABDOMEN: soft, nontender, no hepatosplenomegaly, no masses and bowel sounds normal  MS: no gross musculoskeletal defects noted, no edema  SKIN: no suspicious lesions or rashes  PSYCH: mentation appears normal, affect normal/bright    Diagnostic Test Results:  Labs reviewed in Epic  No results found for any visits on 08/16/21.        Assessment & Plan     Type 2 diabetes mellitus without complication, without long-term current use of insulin (H)  She is going to have her labs  Tomorrow. Eye exam is done. Has Multiple Myeloma and labs are frequently done.  M spike is ok.  She is feeling over all well. Weight is up a little.   - Hemoglobin A1c; Future    Mixed hyperlipidemia  Recheck fasting.   - Lipid Profile (Chol, Trig, HDL, LDL calc); Future    Multiple myeloma not having achieved remission (H)  Numbers are better. On Velcade., Darcelex.  Labs are due tomorrow and we will be getting her labs all done together tomorrow.        BMI:   Estimated body mass index is 30.36 kg/m  as calculated from the following:    Height as of this encounter: 1.6 m (5' 3\").    Weight as of this encounter: 77.7 kg (171 lb 6.4 oz).   Weight management " plan: Discussed healthy diet and exercise guidelines         See Patient Instructions    No follow-ups on file.    VENKATESH Trevino  [facility]

## 2021-08-16 ENCOUNTER — OFFICE VISIT (OUTPATIENT)
Dept: FAMILY MEDICINE | Facility: OTHER | Age: 71
End: 2021-08-16
Attending: PHYSICIAN ASSISTANT
Payer: COMMERCIAL

## 2021-08-16 VITALS
HEART RATE: 78 BPM | HEIGHT: 63 IN | TEMPERATURE: 97.8 F | WEIGHT: 171.4 LBS | SYSTOLIC BLOOD PRESSURE: 130 MMHG | BODY MASS INDEX: 30.37 KG/M2 | DIASTOLIC BLOOD PRESSURE: 80 MMHG | OXYGEN SATURATION: 96 %

## 2021-08-16 DIAGNOSIS — C90.00 MULTIPLE MYELOMA NOT HAVING ACHIEVED REMISSION (H): ICD-10-CM

## 2021-08-16 DIAGNOSIS — E11.9 TYPE 2 DIABETES MELLITUS WITHOUT COMPLICATION, WITHOUT LONG-TERM CURRENT USE OF INSULIN (H): Primary | ICD-10-CM

## 2021-08-16 DIAGNOSIS — E78.2 MIXED HYPERLIPIDEMIA: ICD-10-CM

## 2021-08-16 PROCEDURE — 99214 OFFICE O/P EST MOD 30 MIN: CPT | Performed by: PHYSICIAN ASSISTANT

## 2021-08-16 PROCEDURE — G0463 HOSPITAL OUTPT CLINIC VISIT: HCPCS

## 2021-08-16 ASSESSMENT — ANXIETY QUESTIONNAIRES
5. BEING SO RESTLESS THAT IT IS HARD TO SIT STILL: NOT AT ALL
7. FEELING AFRAID AS IF SOMETHING AWFUL MIGHT HAPPEN: NOT AT ALL
GAD7 TOTAL SCORE: 0
1. FEELING NERVOUS, ANXIOUS, OR ON EDGE: NOT AT ALL
4. TROUBLE RELAXING: NOT AT ALL
2. NOT BEING ABLE TO STOP OR CONTROL WORRYING: NOT AT ALL
6. BECOMING EASILY ANNOYED OR IRRITABLE: NOT AT ALL
3. WORRYING TOO MUCH ABOUT DIFFERENT THINGS: NOT AT ALL

## 2021-08-16 ASSESSMENT — PATIENT HEALTH QUESTIONNAIRE - PHQ9: SUM OF ALL RESPONSES TO PHQ QUESTIONS 1-9: 0

## 2021-08-16 ASSESSMENT — MIFFLIN-ST. JEOR: SCORE: 1256.6

## 2021-08-16 ASSESSMENT — PAIN SCALES - GENERAL: PAINLEVEL: NO PAIN (0)

## 2021-08-16 NOTE — NURSING NOTE
"Chief Complaint   Patient presents with     Medication Follow-up       Initial Blood Pressure 130/80 (BP Location: Left arm, Patient Position: Sitting, Cuff Size: Adult Regular)   Pulse 78   Temperature 97.8  F (36.6  C) (Tympanic)   Height 1.6 m (5' 3\")   Weight 77.7 kg (171 lb 6.4 oz)   Oxygen Saturation 96%   Body Mass Index 30.36 kg/m   Estimated body mass index is 30.36 kg/m  as calculated from the following:    Height as of this encounter: 1.6 m (5' 3\").    Weight as of this encounter: 77.7 kg (171 lb 6.4 oz).  Medication Reconciliation: complete  Marlyn Loo LPN  "

## 2021-08-17 ENCOUNTER — LAB (OUTPATIENT)
Dept: LAB | Facility: OTHER | Age: 71
End: 2021-08-17
Payer: MEDICARE

## 2021-08-17 DIAGNOSIS — E11.9 TYPE 2 DIABETES MELLITUS WITHOUT COMPLICATION, WITHOUT LONG-TERM CURRENT USE OF INSULIN (H): ICD-10-CM

## 2021-08-17 DIAGNOSIS — E78.2 MIXED HYPERLIPIDEMIA: ICD-10-CM

## 2021-08-17 DIAGNOSIS — C90.00 MULTIPLE MYELOMA NOT HAVING ACHIEVED REMISSION (H): Primary | ICD-10-CM

## 2021-08-17 LAB
CHOLEST SERPL-MCNC: 265 MG/DL
EST. AVERAGE GLUCOSE BLD GHB EST-MCNC: 137 MG/DL
FASTING STATUS PATIENT QL REPORTED: YES
HBA1C MFR BLD: 6.4 % (ref 0–5.6)
HDLC SERPL-MCNC: 46 MG/DL
LDLC SERPL CALC-MCNC: 166 MG/DL
NONHDLC SERPL-MCNC: 219 MG/DL
TRIGL SERPL-MCNC: 266 MG/DL

## 2021-08-17 PROCEDURE — 83036 HEMOGLOBIN GLYCOSYLATED A1C: CPT | Mod: ZL

## 2021-08-17 PROCEDURE — 86334 IMMUNOFIX E-PHORESIS SERUM: CPT | Mod: ZL

## 2021-08-17 PROCEDURE — 36415 COLL VENOUS BLD VENIPUNCTURE: CPT | Mod: ZL

## 2021-08-17 PROCEDURE — 80061 LIPID PANEL: CPT | Mod: ZL

## 2021-08-17 PROCEDURE — 83883 ASSAY NEPHELOMETRY NOT SPEC: CPT | Mod: ZL

## 2021-08-17 PROCEDURE — 84155 ASSAY OF PROTEIN SERUM: CPT | Mod: ZL

## 2021-08-17 PROCEDURE — 84165 PROTEIN E-PHORESIS SERUM: CPT | Mod: ZL

## 2021-08-17 ASSESSMENT — ANXIETY QUESTIONNAIRES: GAD7 TOTAL SCORE: 0

## 2021-08-18 LAB — TOTAL PROTEIN SERUM FOR ELP: 7.1 G/DL (ref 6.8–8.8)

## 2021-08-19 LAB
ALBUMIN SERPL ELPH-MCNC: 4.3 G/DL (ref 3.7–5.1)
ALPHA1 GLOB SERPL ELPH-MCNC: 0.3 G/DL (ref 0.2–0.4)
ALPHA2 GLOB SERPL ELPH-MCNC: 0.8 G/DL (ref 0.5–0.9)
B-GLOBULIN SERPL ELPH-MCNC: 0.7 G/DL (ref 0.6–1)
GAMMA GLOB SERPL ELPH-MCNC: 1 G/DL (ref 0.7–1.6)
KAPPA LC FREE SER-MCNC: 0.44 MG/DL (ref 0.33–1.94)
KAPPA LC FREE/LAMBDA FREE SER NEPH: 2.59 {RATIO} (ref 0.26–1.65)
LAMBDA LC FREE SERPL-MCNC: 0.17 MG/DL (ref 0.57–2.63)
M PROTEIN SERPL ELPH-MCNC: 0.5 G/DL
PROT PATTERN SERPL ELPH-IMP: ABNORMAL
PROT PATTERN SERPL IFE-IMP: NORMAL

## 2021-08-19 PROCEDURE — 86334 IMMUNOFIX E-PHORESIS SERUM: CPT | Mod: 26 | Performed by: STUDENT IN AN ORGANIZED HEALTH CARE EDUCATION/TRAINING PROGRAM

## 2021-08-19 PROCEDURE — 84165 PROTEIN E-PHORESIS SERUM: CPT | Mod: 26 | Performed by: STUDENT IN AN ORGANIZED HEALTH CARE EDUCATION/TRAINING PROGRAM

## 2021-08-23 ENCOUNTER — APPOINTMENT (OUTPATIENT)
Dept: LAB | Facility: OTHER | Age: 71
End: 2021-08-23
Attending: NURSE PRACTITIONER
Payer: MEDICARE

## 2021-08-23 ENCOUNTER — ONCOLOGY VISIT (OUTPATIENT)
Dept: ONCOLOGY | Facility: OTHER | Age: 71
End: 2021-08-23
Attending: NURSE PRACTITIONER
Payer: MEDICARE

## 2021-08-23 ENCOUNTER — INFUSION THERAPY VISIT (OUTPATIENT)
Dept: INFUSION THERAPY | Facility: OTHER | Age: 71
End: 2021-08-23
Attending: INTERNAL MEDICINE
Payer: MEDICARE

## 2021-08-23 VITALS
SYSTOLIC BLOOD PRESSURE: 104 MMHG | TEMPERATURE: 98.7 F | OXYGEN SATURATION: 96 % | DIASTOLIC BLOOD PRESSURE: 68 MMHG | BODY MASS INDEX: 29.88 KG/M2 | HEART RATE: 88 BPM | HEIGHT: 63 IN | WEIGHT: 168.65 LBS

## 2021-08-23 DIAGNOSIS — C90.00 MULTIPLE MYELOMA NOT HAVING ACHIEVED REMISSION (H): Primary | ICD-10-CM

## 2021-08-23 LAB
ALBUMIN SERPL-MCNC: 3.4 G/DL (ref 3.4–5)
ALP SERPL-CCNC: 137 U/L (ref 40–150)
ALT SERPL W P-5'-P-CCNC: 17 U/L (ref 0–50)
ANION GAP SERPL CALCULATED.3IONS-SCNC: 6 MMOL/L (ref 3–14)
AST SERPL W P-5'-P-CCNC: 12 U/L (ref 0–45)
BASOPHILS # BLD AUTO: 0 10E3/UL (ref 0–0.2)
BASOPHILS NFR BLD AUTO: 1 %
BILIRUB SERPL-MCNC: 0.4 MG/DL (ref 0.2–1.3)
BUN SERPL-MCNC: 19 MG/DL (ref 7–30)
CALCIUM SERPL-MCNC: 8.9 MG/DL (ref 8.5–10.1)
CHLORIDE BLD-SCNC: 106 MMOL/L (ref 94–109)
CO2 SERPL-SCNC: 25 MMOL/L (ref 20–32)
CREAT SERPL-MCNC: 0.79 MG/DL (ref 0.52–1.04)
EOSINOPHIL # BLD AUTO: 0.4 10E3/UL (ref 0–0.7)
EOSINOPHIL NFR BLD AUTO: 7 %
ERYTHROCYTE [DISTWIDTH] IN BLOOD BY AUTOMATED COUNT: 14.4 % (ref 10–15)
GFR SERPL CREATININE-BSD FRML MDRD: 75 ML/MIN/1.73M2
GLUCOSE BLD-MCNC: 110 MG/DL (ref 70–99)
HCT VFR BLD AUTO: 37.8 % (ref 35–47)
HGB BLD-MCNC: 13.1 G/DL (ref 11.7–15.7)
IMM GRANULOCYTES # BLD: 0.1 10E3/UL
IMM GRANULOCYTES NFR BLD: 1 %
LYMPHOCYTES # BLD AUTO: 0.9 10E3/UL (ref 0.8–5.3)
LYMPHOCYTES NFR BLD AUTO: 17 %
MCH RBC QN AUTO: 31.7 PG (ref 26.5–33)
MCHC RBC AUTO-ENTMCNC: 34.7 G/DL (ref 31.5–36.5)
MCV RBC AUTO: 92 FL (ref 78–100)
MONOCYTES # BLD AUTO: 0.7 10E3/UL (ref 0–1.3)
MONOCYTES NFR BLD AUTO: 13 %
NEUTROPHILS # BLD AUTO: 3.3 10E3/UL (ref 1.6–8.3)
NEUTROPHILS NFR BLD AUTO: 61 %
NRBC # BLD AUTO: 0 10E3/UL
NRBC BLD AUTO-RTO: 0 /100
PLATELET # BLD AUTO: 240 10E3/UL (ref 150–450)
POTASSIUM BLD-SCNC: 3.8 MMOL/L (ref 3.4–5.3)
PROT SERPL-MCNC: 7 G/DL (ref 6.8–8.8)
RBC # BLD AUTO: 4.13 10E6/UL (ref 3.8–5.2)
SODIUM SERPL-SCNC: 137 MMOL/L (ref 133–144)
WBC # BLD AUTO: 5.4 10E3/UL (ref 4–11)

## 2021-08-23 PROCEDURE — 85025 COMPLETE CBC W/AUTO DIFF WBC: CPT | Mod: ZL | Performed by: INTERNAL MEDICINE

## 2021-08-23 PROCEDURE — 96401 CHEMO ANTI-NEOPL SQ/IM: CPT

## 2021-08-23 PROCEDURE — 82040 ASSAY OF SERUM ALBUMIN: CPT | Mod: ZL | Performed by: INTERNAL MEDICINE

## 2021-08-23 PROCEDURE — 99214 OFFICE O/P EST MOD 30 MIN: CPT | Performed by: INTERNAL MEDICINE

## 2021-08-23 PROCEDURE — 36415 COLL VENOUS BLD VENIPUNCTURE: CPT | Mod: ZL | Performed by: INTERNAL MEDICINE

## 2021-08-23 PROCEDURE — 250N000013 HC RX MED GY IP 250 OP 250 PS 637: Performed by: INTERNAL MEDICINE

## 2021-08-23 PROCEDURE — 250N000011 HC RX IP 250 OP 636: Mod: JW | Performed by: INTERNAL MEDICINE

## 2021-08-23 RX ORDER — ACETAMINOPHEN 325 MG/1
650 TABLET ORAL ONCE
Status: COMPLETED | OUTPATIENT
Start: 2021-08-23 | End: 2021-08-23

## 2021-08-23 RX ORDER — HEPARIN SODIUM (PORCINE) LOCK FLUSH IV SOLN 100 UNIT/ML 100 UNIT/ML
5 SOLUTION INTRAVENOUS
Status: CANCELLED | OUTPATIENT
Start: 2021-08-27

## 2021-08-23 RX ORDER — MEPERIDINE HYDROCHLORIDE 25 MG/ML
25 INJECTION INTRAMUSCULAR; INTRAVENOUS; SUBCUTANEOUS EVERY 30 MIN PRN
Status: CANCELLED | OUTPATIENT
Start: 2021-09-03

## 2021-08-23 RX ORDER — MEPERIDINE HYDROCHLORIDE 25 MG/ML
25 INJECTION INTRAMUSCULAR; INTRAVENOUS; SUBCUTANEOUS EVERY 30 MIN PRN
Status: CANCELLED | OUTPATIENT
Start: 2021-08-31

## 2021-08-23 RX ORDER — HEPARIN SODIUM (PORCINE) LOCK FLUSH IV SOLN 100 UNIT/ML 100 UNIT/ML
5 SOLUTION INTRAVENOUS
Status: CANCELLED | OUTPATIENT
Start: 2021-08-31

## 2021-08-23 RX ORDER — NALOXONE HYDROCHLORIDE 0.4 MG/ML
.1-.4 INJECTION, SOLUTION INTRAMUSCULAR; INTRAVENOUS; SUBCUTANEOUS
Status: CANCELLED | OUTPATIENT
Start: 2021-08-31

## 2021-08-23 RX ORDER — METHYLPREDNISOLONE SODIUM SUCCINATE 125 MG/2ML
125 INJECTION, POWDER, LYOPHILIZED, FOR SOLUTION INTRAMUSCULAR; INTRAVENOUS
Status: CANCELLED
Start: 2021-09-03

## 2021-08-23 RX ORDER — SODIUM CHLORIDE 9 MG/ML
1000 INJECTION, SOLUTION INTRAVENOUS CONTINUOUS PRN
Status: CANCELLED
Start: 2021-09-03

## 2021-08-23 RX ORDER — EPINEPHRINE 1 MG/ML
0.3 INJECTION, SOLUTION, CONCENTRATE INTRAVENOUS EVERY 5 MIN PRN
Status: CANCELLED | OUTPATIENT
Start: 2021-09-03

## 2021-08-23 RX ORDER — METHYLPREDNISOLONE SODIUM SUCCINATE 125 MG/2ML
125 INJECTION, POWDER, LYOPHILIZED, FOR SOLUTION INTRAMUSCULAR; INTRAVENOUS
Status: CANCELLED
Start: 2021-08-24

## 2021-08-23 RX ORDER — LORAZEPAM 2 MG/ML
0.5 INJECTION INTRAMUSCULAR EVERY 4 HOURS PRN
Status: CANCELLED | OUTPATIENT
Start: 2021-08-27

## 2021-08-23 RX ORDER — LORAZEPAM 2 MG/ML
0.5 INJECTION INTRAMUSCULAR EVERY 4 HOURS PRN
Status: CANCELLED | OUTPATIENT
Start: 2021-09-03

## 2021-08-23 RX ORDER — EPINEPHRINE 1 MG/ML
0.3 INJECTION, SOLUTION, CONCENTRATE INTRAVENOUS EVERY 5 MIN PRN
Status: CANCELLED | OUTPATIENT
Start: 2021-08-24

## 2021-08-23 RX ORDER — HEPARIN SODIUM,PORCINE 10 UNIT/ML
5 VIAL (ML) INTRAVENOUS
Status: CANCELLED | OUTPATIENT
Start: 2021-08-31

## 2021-08-23 RX ORDER — METHYLPREDNISOLONE SODIUM SUCCINATE 125 MG/2ML
125 INJECTION, POWDER, LYOPHILIZED, FOR SOLUTION INTRAMUSCULAR; INTRAVENOUS
Status: CANCELLED
Start: 2021-08-27

## 2021-08-23 RX ORDER — SODIUM CHLORIDE 9 MG/ML
1000 INJECTION, SOLUTION INTRAVENOUS CONTINUOUS PRN
Status: CANCELLED
Start: 2021-08-31

## 2021-08-23 RX ORDER — ALBUTEROL SULFATE 0.83 MG/ML
2.5 SOLUTION RESPIRATORY (INHALATION)
Status: CANCELLED | OUTPATIENT
Start: 2021-08-27

## 2021-08-23 RX ORDER — NALOXONE HYDROCHLORIDE 0.4 MG/ML
.1-.4 INJECTION, SOLUTION INTRAMUSCULAR; INTRAVENOUS; SUBCUTANEOUS
Status: CANCELLED | OUTPATIENT
Start: 2021-08-27

## 2021-08-23 RX ORDER — HEPARIN SODIUM,PORCINE 10 UNIT/ML
5 VIAL (ML) INTRAVENOUS
Status: CANCELLED | OUTPATIENT
Start: 2021-08-24

## 2021-08-23 RX ORDER — ALBUTEROL SULFATE 90 UG/1
1-2 AEROSOL, METERED RESPIRATORY (INHALATION)
Status: CANCELLED
Start: 2021-08-27

## 2021-08-23 RX ORDER — HEPARIN SODIUM,PORCINE 10 UNIT/ML
5 VIAL (ML) INTRAVENOUS
Status: CANCELLED | OUTPATIENT
Start: 2021-08-27

## 2021-08-23 RX ORDER — NALOXONE HYDROCHLORIDE 0.4 MG/ML
.1-.4 INJECTION, SOLUTION INTRAMUSCULAR; INTRAVENOUS; SUBCUTANEOUS
Status: CANCELLED | OUTPATIENT
Start: 2021-09-03

## 2021-08-23 RX ORDER — DIPHENHYDRAMINE HCL 50 MG
50 CAPSULE ORAL ONCE
Status: COMPLETED | OUTPATIENT
Start: 2021-08-23 | End: 2021-08-23

## 2021-08-23 RX ORDER — HEPARIN SODIUM,PORCINE 10 UNIT/ML
5 VIAL (ML) INTRAVENOUS
Status: CANCELLED | OUTPATIENT
Start: 2021-09-03

## 2021-08-23 RX ORDER — ALBUTEROL SULFATE 0.83 MG/ML
2.5 SOLUTION RESPIRATORY (INHALATION)
Status: CANCELLED | OUTPATIENT
Start: 2021-09-03

## 2021-08-23 RX ORDER — EPINEPHRINE 1 MG/ML
0.3 INJECTION, SOLUTION, CONCENTRATE INTRAVENOUS EVERY 5 MIN PRN
Status: CANCELLED | OUTPATIENT
Start: 2021-08-27

## 2021-08-23 RX ORDER — ALBUTEROL SULFATE 0.83 MG/ML
2.5 SOLUTION RESPIRATORY (INHALATION)
Status: CANCELLED | OUTPATIENT
Start: 2021-08-24

## 2021-08-23 RX ORDER — NALOXONE HYDROCHLORIDE 0.4 MG/ML
.1-.4 INJECTION, SOLUTION INTRAMUSCULAR; INTRAVENOUS; SUBCUTANEOUS
Status: CANCELLED | OUTPATIENT
Start: 2021-08-24

## 2021-08-23 RX ORDER — ALBUTEROL SULFATE 90 UG/1
1-2 AEROSOL, METERED RESPIRATORY (INHALATION)
Status: CANCELLED
Start: 2021-09-03

## 2021-08-23 RX ORDER — ALBUTEROL SULFATE 0.83 MG/ML
2.5 SOLUTION RESPIRATORY (INHALATION)
Status: CANCELLED | OUTPATIENT
Start: 2021-08-31

## 2021-08-23 RX ORDER — MEPERIDINE HYDROCHLORIDE 25 MG/ML
25 INJECTION INTRAMUSCULAR; INTRAVENOUS; SUBCUTANEOUS EVERY 30 MIN PRN
Status: CANCELLED | OUTPATIENT
Start: 2021-08-24

## 2021-08-23 RX ORDER — DIPHENHYDRAMINE HYDROCHLORIDE 50 MG/ML
50 INJECTION INTRAMUSCULAR; INTRAVENOUS
Status: CANCELLED
Start: 2021-08-24

## 2021-08-23 RX ORDER — DIPHENHYDRAMINE HYDROCHLORIDE 50 MG/ML
50 INJECTION INTRAMUSCULAR; INTRAVENOUS
Status: CANCELLED
Start: 2021-08-31

## 2021-08-23 RX ORDER — SODIUM CHLORIDE 9 MG/ML
1000 INJECTION, SOLUTION INTRAVENOUS CONTINUOUS PRN
Status: CANCELLED
Start: 2021-08-24

## 2021-08-23 RX ORDER — SODIUM CHLORIDE 9 MG/ML
1000 INJECTION, SOLUTION INTRAVENOUS CONTINUOUS PRN
Status: CANCELLED
Start: 2021-08-27

## 2021-08-23 RX ORDER — EPINEPHRINE 1 MG/ML
0.3 INJECTION, SOLUTION, CONCENTRATE INTRAVENOUS EVERY 5 MIN PRN
Status: CANCELLED | OUTPATIENT
Start: 2021-08-31

## 2021-08-23 RX ORDER — DIPHENHYDRAMINE HYDROCHLORIDE 50 MG/ML
50 INJECTION INTRAMUSCULAR; INTRAVENOUS
Status: CANCELLED
Start: 2021-08-27

## 2021-08-23 RX ORDER — MEPERIDINE HYDROCHLORIDE 25 MG/ML
25 INJECTION INTRAMUSCULAR; INTRAVENOUS; SUBCUTANEOUS EVERY 30 MIN PRN
Status: CANCELLED | OUTPATIENT
Start: 2021-08-27

## 2021-08-23 RX ORDER — LORAZEPAM 2 MG/ML
0.5 INJECTION INTRAMUSCULAR EVERY 4 HOURS PRN
Status: CANCELLED | OUTPATIENT
Start: 2021-08-24

## 2021-08-23 RX ORDER — ALBUTEROL SULFATE 90 UG/1
1-2 AEROSOL, METERED RESPIRATORY (INHALATION)
Status: CANCELLED
Start: 2021-08-24

## 2021-08-23 RX ORDER — HEPARIN SODIUM (PORCINE) LOCK FLUSH IV SOLN 100 UNIT/ML 100 UNIT/ML
5 SOLUTION INTRAVENOUS
Status: CANCELLED | OUTPATIENT
Start: 2021-08-24

## 2021-08-23 RX ORDER — METHYLPREDNISOLONE SODIUM SUCCINATE 125 MG/2ML
125 INJECTION, POWDER, LYOPHILIZED, FOR SOLUTION INTRAMUSCULAR; INTRAVENOUS
Status: CANCELLED
Start: 2021-08-31

## 2021-08-23 RX ORDER — LORAZEPAM 2 MG/ML
0.5 INJECTION INTRAMUSCULAR EVERY 4 HOURS PRN
Status: CANCELLED | OUTPATIENT
Start: 2021-08-31

## 2021-08-23 RX ORDER — HEPARIN SODIUM (PORCINE) LOCK FLUSH IV SOLN 100 UNIT/ML 100 UNIT/ML
5 SOLUTION INTRAVENOUS
Status: CANCELLED | OUTPATIENT
Start: 2021-09-03

## 2021-08-23 RX ORDER — DIPHENHYDRAMINE HYDROCHLORIDE 50 MG/ML
50 INJECTION INTRAMUSCULAR; INTRAVENOUS
Status: CANCELLED
Start: 2021-09-03

## 2021-08-23 RX ORDER — ALBUTEROL SULFATE 90 UG/1
1-2 AEROSOL, METERED RESPIRATORY (INHALATION)
Status: CANCELLED
Start: 2021-08-31

## 2021-08-23 RX ADMIN — DIPHENHYDRAMINE HYDROCHLORIDE 50 MG: 50 CAPSULE ORAL at 10:41

## 2021-08-23 RX ADMIN — ACETAMINOPHEN 650 MG: 325 TABLET, FILM COATED ORAL at 10:41

## 2021-08-23 RX ADMIN — BORTEZOMIB 2.4 MG: 3.5 INJECTION, POWDER, LYOPHILIZED, FOR SOLUTION INTRAVENOUS; SUBCUTANEOUS at 11:27

## 2021-08-23 RX ADMIN — DARATUMUMAB AND HYALURONIDASE-FIHJ (HUMAN RECOMBINANT) 1800 MG: 1800; 30000 INJECTION SUBCUTANEOUS at 11:18

## 2021-08-23 ASSESSMENT — MIFFLIN-ST. JEOR: SCORE: 1244

## 2021-08-23 NOTE — PROGRESS NOTES
Visit Date: 08/23/2021    HEMATOLOGY/ONCOLOGY CLINIC NOTE     HISTORY OF PRESENT ILLNESS:  Ms. Watts returns for followup of IgA multiple myeloma.  For details, see previous notes.  The patient was initiated on CyBorD regimen.  She progressed on Velcade and Decadron.  The patient was started on CyBorD regimen with Velcade given at a dose of 1.5 mg/m2 weekly on days 1, 4, 8, 8 and 15 and 22 of a 28-day regimen Cytoxan was given at a dose of 140 mg/m2 weekly or 200 mg weekly on days 1, 4, 8, 8, 15 and 22.  She received a reduced dose of Cytoxan based on an immunosuppressive state.  She will also continue on Zovirax prophylaxis.  Dexamethasone was given 40 mg weekly on days 1, 4, 8, 8, 15 and 22.  After 1 cycle, her M spike had dropped as well as her IgG level.  The patient completed 3 cycles of CyBorD with Cytoxan being dose reduced 150 mg every 7 days.  Nonetheless, her M spike dropped from 6.2, all the way down to 1.2.  She completed 35 cycles.  She had been followed by Bonita Quintana, nurse practitioner, over the past 10 months.  Her M spike remained at 1 and it was elected to initiate Darzalex or Faspro 1800 mg subcutaneously as per protocol.  Subsequently, on 03/23/2021, her M spike increased from 1-1.2 and it was decided to add Velcade and dexamethasone to the plan.  She was started on Darzalex or Faspro 1800 mg subcutaneously per protocol, Velcade 1.3 mg/m2 and dexamethasone 20 mg added to day 4, 8, and 11 with cycle given every 21 days.  Zometa was also given 4 mg every 3 months.  Velcade and dexamethasone were added to cycle 5 of Faspro. Since then, her M spike has been dropping.  Apparently, she took a delay of treatment for a period of time.  After cycle 9, her M spike had dropped to 0.6 and she completed 10 cycles.  Apparently, there was a delay at some point in initiation of her treatments due to time requested off but, nonetheless, she is now here for her 12th cycle of chemotherapy, which is actually her  12th cycle of Faslo, Velcade and dexamethasone.  She says she is doing relatively well.  She denies any neuropathy, shortness of breath, chest pain, abdominal pain, fevers, night sweats, weight loss.  She says she gets occasional fatigue.    PHYSICAL EXAMINATION:    GENERAL:  She is a frail, elderly white female in no acute distress.  VITAL SIGNS:  Blood pressure 104/68, pulse 88, temperature 98.7.  HEENT:  Atraumatic, normocephalic.  Oropharynx nonerythematous.  NECK:  Supple.  LUNGS:  Clear to auscultation and percussion.  HEART:  Regular rhythm.  S1, S2 normal.  ABDOMEN:  Soft, normoactive bowel sounds.  No mass, nontender.  LYMPHATICS:  No cervical or supraclavicular nodes.  EXTREMITIES:  Trace ankle edema.  NEUROLOGIC:  Nonfocal.    LABORATORY DATA:  CBC with white count 5.4, H and H 13.1 and 37.8, platelet count 240.  BUN 19, creatinine 0.79.  LFTs are normal.  Labs from 08/17 reveal a monoclonal IgM globulin kappa light chain type with serum protein electrophoresis revealing M spike of 0.5, kappa-lambda ratio is 2.59.      IMPRESSION:  IgA kappa multiple myeloma with 80% involvement of bone marrow, IPSS staging is consistent with stage II.  Elevated kappa-lambda ratio standard risk cytogenetics deemed a candidate for Velcade, Revlimid, and Decadron.  The patient could not afford Revlimid.  Therefore, she was started on Velcade and Decadron.  She refused stem cell transplant evaluation in consultation at the .  She received 2 cycles with positive drop in M protein.  Subsequently, developed a rise in M protein, rising kappa-lambda ratio, initiated on CyBorD regimen in 10/2019.  She had a positive response after 1 cycle, M spike dropping to 6.2 down 3.8 and after 5 cycles had dropped to 1.2, but remained stable after 36 cycles.  She was switched to daratumumab subcutaneously to be given on days 1, 8, 15 and 22 as per protocol.  After 4 cycles, her M spike had dropped to 2 but it magy from 1 to 1.2.  We elected  to add Velcade and Decadron.  She has received at least 6 cycles of Velcade and Decadron and daratumumab or 11 cycles.  She is here for her 12th cycle of daratumumab-based therapy.  Her M spike has dropped to 0.4, slightly elevated at 0.5, but there is no evidence of end-organ damage.  She is asymptomatic.  We would like to proceed with this 12th cycle of Fasoro, Velcade and dexamethasone.  If M spike began to rise, we may consider alternative therapies, but for now we will continue to monitor.  She has no evidence of end-organ damage at this point.    A hundred minutes were spent on this patient.  Time was spent reviewing multiple physician provider notes, lab work, performing history and physical, documenting history, ordering followup labs and scans.      Elías Walker MD        D: 2021   T: 2021   MT: [id number]    Name:     WILLIAM WILDER  MRN:      [id number]        Account:    [id number]   :      1950           Visit Date: 2021     Document: [id number]    cc:  [email or web address] Vázquez

## 2021-08-23 NOTE — PROGRESS NOTES
"Oncology Rooming Note    August 23, 2021 9:38 AM   Carmelita Watts is a 70 year old female who presents for:    No chief complaint on file.    Initial Vitals: There were no vitals taken for this visit. Estimated body mass index is 30.36 kg/m  as calculated from the following:    Height as of 8/16/21: 1.6 m (5' 3\").    Weight as of 8/16/21: 77.7 kg (171 lb 6.4 oz). There is no height or weight on file to calculate BSA.  Data Unavailable Comment: Data Unavailable   No LMP recorded. Patient is postmenopausal.  Allergies reviewed: Yes  Medications reviewed: Yes    Medications: Medication refills not needed today.  Pharmacy name entered into [facility]:    [id number] [address] [place] [address] AT [facility] [place] [facility] [address]  [facility] - [place], [place] - [address]    Clinical concerns: Dr Walker was notified.      Eve Garcia LPN                "

## 2021-08-23 NOTE — PATIENT INSTRUCTIONS
We would like to see you back per calender. Please come 30minute(s) prior for lab work.  When you are in need of a refill of your medications, please call your pharmacy and they will send us the request. If you have any questions please call [phone]

## 2021-08-23 NOTE — PATIENT INSTRUCTIONS
We would like to see you back per calender. Please come 30minute(s) prior for lab work. When you are in need of a refill of your medications, please call your pharmacy and they will send us the request. If you have any questions please call [phone]

## 2021-08-23 NOTE — PROGRESS NOTES
Patient is 70 years old, here today accompanied by self for injection of Faspro and Velcade per order of Dr Walker.     Labs meet parameters for today's injection.     Patient denies questions nor concerns regarding medication, administration site, side effects, nor aftercare.  Patient identified with two identifiers, order verified, and verbal consent for today's injection obtained from patient.   Patient education provided on s/s of injection site infection, and/or medication specific side effects, and when to call a provider.  Patient instructed to report any adverse effects.     1118: Faspro administered per protocol in left upper abdomen at 45 degree angle.  Site covered with sterile bandage.  Patient tolerated injection well, no verbal nor non-verbal signs of discomfort noted.  No adverse effects noted at this time.     1127: Velcade administered per protocol in right upper outer arm at 45 degree angle.  Site covered with sterile bandage.  Patient tolerated injection well, no verbal nor non-verbal signs of discomfort noted.  No adverse effects noted at this time.     Patient instructed to call with further questions or concerns.  Patient states understanding and is in agreement with this plan.  Copy of appointments, and after visit summary (AVS) given to patient. Patient discharged.

## 2021-08-26 ENCOUNTER — INFUSION THERAPY VISIT (OUTPATIENT)
Dept: INFUSION THERAPY | Facility: OTHER | Age: 71
End: 2021-08-26
Attending: INTERNAL MEDICINE
Payer: MEDICARE

## 2021-08-26 VITALS
TEMPERATURE: 98.2 F | WEIGHT: 169.97 LBS | RESPIRATION RATE: 16 BRPM | BODY MASS INDEX: 30.12 KG/M2 | DIASTOLIC BLOOD PRESSURE: 73 MMHG | HEIGHT: 63 IN | OXYGEN SATURATION: 96 % | SYSTOLIC BLOOD PRESSURE: 116 MMHG | HEART RATE: 87 BPM

## 2021-08-26 DIAGNOSIS — C90.00 MULTIPLE MYELOMA NOT HAVING ACHIEVED REMISSION (H): Primary | ICD-10-CM

## 2021-08-26 PROCEDURE — 96401 CHEMO ANTI-NEOPL SQ/IM: CPT

## 2021-08-26 PROCEDURE — 250N000011 HC RX IP 250 OP 636: Mod: JW | Performed by: INTERNAL MEDICINE

## 2021-08-26 RX ADMIN — BORTEZOMIB 2.4 MG: 3.5 INJECTION, POWDER, LYOPHILIZED, FOR SOLUTION INTRAVENOUS; SUBCUTANEOUS at 13:22

## 2021-08-26 ASSESSMENT — PAIN SCALES - GENERAL: PAINLEVEL: NO PAIN (0)

## 2021-08-26 ASSESSMENT — MIFFLIN-ST. JEOR: SCORE: 1250.13

## 2021-08-30 ENCOUNTER — INFUSION THERAPY VISIT (OUTPATIENT)
Dept: INFUSION THERAPY | Facility: OTHER | Age: 71
End: 2021-08-30
Attending: INTERNAL MEDICINE
Payer: MEDICARE

## 2021-08-30 VITALS
HEART RATE: 77 BPM | BODY MASS INDEX: 30.08 KG/M2 | SYSTOLIC BLOOD PRESSURE: 104 MMHG | TEMPERATURE: 98.6 F | DIASTOLIC BLOOD PRESSURE: 64 MMHG | OXYGEN SATURATION: 95 % | RESPIRATION RATE: 18 BRPM | WEIGHT: 169.75 LBS | HEIGHT: 63 IN

## 2021-08-30 DIAGNOSIS — C90.00 MULTIPLE MYELOMA NOT HAVING ACHIEVED REMISSION (H): Primary | ICD-10-CM

## 2021-08-30 LAB
BASOPHILS # BLD AUTO: 0 10E3/UL (ref 0–0.2)
BASOPHILS NFR BLD AUTO: 0 %
EOSINOPHIL # BLD AUTO: 0.4 10E3/UL (ref 0–0.7)
EOSINOPHIL NFR BLD AUTO: 8 %
ERYTHROCYTE [DISTWIDTH] IN BLOOD BY AUTOMATED COUNT: 14.5 % (ref 10–15)
HCT VFR BLD AUTO: 37.8 % (ref 35–47)
HGB BLD-MCNC: 12.7 G/DL (ref 11.7–15.7)
IMM GRANULOCYTES # BLD: 0.1 10E3/UL
IMM GRANULOCYTES NFR BLD: 1 %
LYMPHOCYTES # BLD AUTO: 1.3 10E3/UL (ref 0.8–5.3)
LYMPHOCYTES NFR BLD AUTO: 23 %
MCH RBC QN AUTO: 30.8 PG (ref 26.5–33)
MCHC RBC AUTO-ENTMCNC: 33.6 G/DL (ref 31.5–36.5)
MCV RBC AUTO: 92 FL (ref 78–100)
MONOCYTES # BLD AUTO: 1.1 10E3/UL (ref 0–1.3)
MONOCYTES NFR BLD AUTO: 19 %
NEUTROPHILS # BLD AUTO: 2.7 10E3/UL (ref 1.6–8.3)
NEUTROPHILS NFR BLD AUTO: 49 %
NRBC # BLD AUTO: 0 10E3/UL
NRBC BLD AUTO-RTO: 0 /100
PLATELET # BLD AUTO: 230 10E3/UL (ref 150–450)
RBC # BLD AUTO: 4.12 10E6/UL (ref 3.8–5.2)
WBC # BLD AUTO: 5.6 10E3/UL (ref 4–11)

## 2021-08-30 PROCEDURE — 96401 CHEMO ANTI-NEOPL SQ/IM: CPT

## 2021-08-30 PROCEDURE — 85025 COMPLETE CBC W/AUTO DIFF WBC: CPT | Mod: ZL | Performed by: INTERNAL MEDICINE

## 2021-08-30 PROCEDURE — 36415 COLL VENOUS BLD VENIPUNCTURE: CPT | Mod: ZL | Performed by: INTERNAL MEDICINE

## 2021-08-30 PROCEDURE — 250N000011 HC RX IP 250 OP 636: Performed by: INTERNAL MEDICINE

## 2021-08-30 RX ADMIN — BORTEZOMIB 2.4 MG: 3.5 INJECTION, POWDER, LYOPHILIZED, FOR SOLUTION INTRAVENOUS; SUBCUTANEOUS at 13:27

## 2021-08-30 ASSESSMENT — MIFFLIN-ST. JEOR: SCORE: 1249

## 2021-08-30 ASSESSMENT — PAIN SCALES - GENERAL: PAINLEVEL: NO PAIN (0)

## 2021-08-30 NOTE — PROGRESS NOTES
Patient is 70 years old, here today for injection of Velcade per order of . Patient meets parameters for today's infusion. Patient identified with two identifiers, order verified, and verbal consent for today's infusion obtained from patient. Written consent for treatment is on file and valid.    Component      Latest Ref Rng & Units 8/30/2021   WBC      4.0 - 11.0 10e3/uL 5.6   RBC Count      3.80 - 5.20 10e6/uL 4.12   Hemoglobin      11.7 - 15.7 g/dL 12.7   Hematocrit      35.0 - 47.0 % 37.8   MCV      78 - 100 fL 92   MCH      26.5 - 33.0 pg 30.8   MCHC      31.5 - 36.5 g/dL 33.6   RDW      10.0 - 15.0 % 14.5   Platelet Count      150 - 450 10e3/uL 230   % Neutrophils      % 49   % Lymphocytes      % 23   % Monocytes      % 19   % Eosinophils      % 8   % Basophils      % 0   % Immature Granulocytes      % 1   NRBCs per 100 WBC      <1 /100 0   Absolute Neutrophils      1.6 - 8.3 10e3/uL 2.7   Absolute Lymphocytes      0.8 - 5.3 10e3/uL 1.3   Absolute Monocytes      0.0 - 1.3 10e3/uL 1.1   Absolute Eosinophils      0.0 - 0.7 10e3/uL 0.4   Absolute Basophils      0.0 - 0.2 10e3/uL 0.0   Absolute Immature Granulocytes      <=0.0 10e3/uL 0.1 (H)   Absolute NRBCs      10e3/uL 0.0       Patient meets order parameters for today's treatment.    Patient denies questions or concerns regarding injection and/or medication(s) being administered.    Independent dose check completed with UZMA Mart.    Velcade injected SQ into right upper outer arm at a 45 degree angle per protocol rotating sites. Patient tolerated injection without incident, no signs or symptoms of adverse reaction noted. Patient denies pain or discomfort.     Covered with a sterile bandage. Pt instructed to leave bandage intact for a minimum of one hour, and to call with questions or concerns. Patient states understanding, discharged.

## 2021-09-02 ENCOUNTER — INFUSION THERAPY VISIT (OUTPATIENT)
Dept: INFUSION THERAPY | Facility: OTHER | Age: 71
End: 2021-09-02
Attending: INTERNAL MEDICINE
Payer: MEDICARE

## 2021-09-02 VITALS
TEMPERATURE: 97.7 F | DIASTOLIC BLOOD PRESSURE: 68 MMHG | RESPIRATION RATE: 18 BRPM | HEIGHT: 63 IN | BODY MASS INDEX: 30.12 KG/M2 | OXYGEN SATURATION: 95 % | SYSTOLIC BLOOD PRESSURE: 101 MMHG | WEIGHT: 169.97 LBS | HEART RATE: 81 BPM

## 2021-09-02 DIAGNOSIS — C90.00 MULTIPLE MYELOMA NOT HAVING ACHIEVED REMISSION (H): Primary | ICD-10-CM

## 2021-09-02 LAB
BASOPHILS # BLD AUTO: 0 10E3/UL (ref 0–0.2)
BASOPHILS NFR BLD AUTO: 1 %
EOSINOPHIL # BLD AUTO: 0.2 10E3/UL (ref 0–0.7)
EOSINOPHIL NFR BLD AUTO: 4 %
ERYTHROCYTE [DISTWIDTH] IN BLOOD BY AUTOMATED COUNT: 14.7 % (ref 10–15)
HCT VFR BLD AUTO: 37.4 % (ref 35–47)
HGB BLD-MCNC: 13 G/DL (ref 11.7–15.7)
IMM GRANULOCYTES # BLD: 0.2 10E3/UL
IMM GRANULOCYTES NFR BLD: 3 %
LYMPHOCYTES # BLD AUTO: 1.4 10E3/UL (ref 0.8–5.3)
LYMPHOCYTES NFR BLD AUTO: 26 %
MCH RBC QN AUTO: 31.6 PG (ref 26.5–33)
MCHC RBC AUTO-ENTMCNC: 34.8 G/DL (ref 31.5–36.5)
MCV RBC AUTO: 91 FL (ref 78–100)
MONOCYTES # BLD AUTO: 1.1 10E3/UL (ref 0–1.3)
MONOCYTES NFR BLD AUTO: 19 %
NEUTROPHILS # BLD AUTO: 2.7 10E3/UL (ref 1.6–8.3)
NEUTROPHILS NFR BLD AUTO: 47 %
NRBC # BLD AUTO: 0 10E3/UL
NRBC BLD AUTO-RTO: 0 /100
PLATELET # BLD AUTO: 175 10E3/UL (ref 150–450)
RBC # BLD AUTO: 4.11 10E6/UL (ref 3.8–5.2)
WBC # BLD AUTO: 5.6 10E3/UL (ref 4–11)

## 2021-09-02 PROCEDURE — 96401 CHEMO ANTI-NEOPL SQ/IM: CPT

## 2021-09-02 PROCEDURE — 250N000011 HC RX IP 250 OP 636: Performed by: INTERNAL MEDICINE

## 2021-09-02 PROCEDURE — 85025 COMPLETE CBC W/AUTO DIFF WBC: CPT | Mod: ZL

## 2021-09-02 PROCEDURE — 36415 COLL VENOUS BLD VENIPUNCTURE: CPT | Mod: ZL

## 2021-09-02 RX ADMIN — BORTEZOMIB 2.4 MG: 3.5 INJECTION, POWDER, LYOPHILIZED, FOR SOLUTION INTRAVENOUS; SUBCUTANEOUS at 13:30

## 2021-09-02 ASSESSMENT — MIFFLIN-ST. JEOR: SCORE: 1250

## 2021-09-02 ASSESSMENT — PAIN SCALES - GENERAL: PAINLEVEL: NO PAIN (0)

## 2021-09-02 NOTE — PATIENT INSTRUCTIONS
We will see you back as planned. If you have any questions or concerns, we can be reached Monday through Friday 8am - 430pm at [phone] ([facility]). If you have concerns related to a potential reaction/side effect after hours/weekends/holiday's, please seek emergent medical care.

## 2021-09-02 NOTE — PROGRESS NOTES
Patient is 70 years old, here today for injection of Velcade per order of . Patient meets parameters for today's infusion. Patient identified with two identifiers, order verified, and verbal consent for today's infusion obtained from patient. Written consent for treatment is on file and valid.    Component      Latest Ref Rng & Units 9/2/2021   WBC      4.0 - 11.0 10e3/uL 5.6   RBC Count      3.80 - 5.20 10e6/uL 4.11   Hemoglobin      11.7 - 15.7 g/dL 13.0   Hematocrit      35.0 - 47.0 % 37.4   MCV      78 - 100 fL 91   MCH      26.5 - 33.0 pg 31.6   MCHC      31.5 - 36.5 g/dL 34.8   RDW      10.0 - 15.0 % 14.7   Platelet Count      150 - 450 10e3/uL 175   % Neutrophils      % 47   % Lymphocytes      % 26   % Monocytes      % 19   % Eosinophils      % 4   % Basophils      % 1   % Immature Granulocytes      % 3   NRBCs per 100 WBC      <1 /100 0   Absolute Neutrophils      1.6 - 8.3 10e3/uL 2.7   Absolute Lymphocytes      0.8 - 5.3 10e3/uL 1.4   Absolute Monocytes      0.0 - 1.3 10e3/uL 1.1   Absolute Eosinophils      0.0 - 0.7 10e3/uL 0.2   Absolute Basophils      0.0 - 0.2 10e3/uL 0.0   Absolute Immature Granulocytes      <=0.0 10e3/uL 0.2 (H)   Absolute NRBCs      10e3/uL 0.0       Patient meets order parameters for today's treatment.    Patient denies questions or concerns regarding injection and/or medication(s) being administered.    Independent dose check completed with UZMA Mart.    Velcade injected SQ into left upper outer arm at a 45 degree angle per protocol rotating sites. Patient tolerated injection without incident, no signs or symptoms of adverse reaction noted. Patient denies pain or discomfort.     Covered with a sterile bandage. Pt instructed to leave bandage intact for a minimum of one hour, and to call with questions or concerns. Copy of appointments, discharge instructions, and after visit summary (AVS) provided to patient. Patient states understanding, discharged.

## 2021-09-07 ENCOUNTER — LAB (OUTPATIENT)
Dept: LAB | Facility: OTHER | Age: 71
End: 2021-09-07
Payer: MEDICARE

## 2021-09-07 DIAGNOSIS — C90.00 MULTIPLE MYELOMA NOT HAVING ACHIEVED REMISSION (H): ICD-10-CM

## 2021-09-07 DIAGNOSIS — C90.00 MULTIPLE MYELOMA NOT HAVING ACHIEVED REMISSION (H): Primary | ICD-10-CM

## 2021-09-07 LAB — TOTAL PROTEIN SERUM FOR ELP: 6.8 G/DL (ref 6.8–8.8)

## 2021-09-07 PROCEDURE — 82784 ASSAY IGA/IGD/IGG/IGM EACH: CPT | Mod: ZL

## 2021-09-07 PROCEDURE — 36415 COLL VENOUS BLD VENIPUNCTURE: CPT | Mod: ZL

## 2021-09-07 PROCEDURE — 83883 ASSAY NEPHELOMETRY NOT SPEC: CPT | Mod: ZL

## 2021-09-07 PROCEDURE — 84155 ASSAY OF PROTEIN SERUM: CPT | Mod: ZL

## 2021-09-07 PROCEDURE — 86334 IMMUNOFIX E-PHORESIS SERUM: CPT | Mod: ZL

## 2021-09-07 PROCEDURE — 84165 PROTEIN E-PHORESIS SERUM: CPT | Mod: ZL | Performed by: PATHOLOGY

## 2021-09-08 DIAGNOSIS — C90.00 MULTIPLE MYELOMA NOT HAVING ACHIEVED REMISSION (H): Primary | ICD-10-CM

## 2021-09-08 LAB
ALBUMIN SERPL ELPH-MCNC: 4.2 G/DL (ref 3.7–5.1)
ALPHA1 GLOB SERPL ELPH-MCNC: 0.4 G/DL (ref 0.2–0.4)
ALPHA2 GLOB SERPL ELPH-MCNC: 0.8 G/DL (ref 0.5–0.9)
B-GLOBULIN SERPL ELPH-MCNC: 0.7 G/DL (ref 0.6–1)
GAMMA GLOB SERPL ELPH-MCNC: 0.9 G/DL (ref 0.7–1.6)
KAPPA LC FREE SER-MCNC: 0.42 MG/DL (ref 0.33–1.94)
KAPPA LC FREE/LAMBDA FREE SER NEPH: 2.47 {RATIO} (ref 0.26–1.65)
LAMBDA LC FREE SERPL-MCNC: 0.17 MG/DL (ref 0.57–2.63)
M PROTEIN SERPL ELPH-MCNC: 0.4 G/DL
PROT PATTERN SERPL ELPH-IMP: ABNORMAL
PROT PATTERN SERPL IFE-IMP: NORMAL

## 2021-09-08 PROCEDURE — 86334 IMMUNOFIX E-PHORESIS SERUM: CPT | Mod: 26 | Performed by: PATHOLOGY

## 2021-09-08 PROCEDURE — 84165 PROTEIN E-PHORESIS SERUM: CPT | Mod: 26 | Performed by: PATHOLOGY

## 2021-09-09 LAB
IGA SERPL-MCNC: 9 MG/DL (ref 84–499)
IGG SERPL-MCNC: 948 MG/DL (ref 610–1616)
IGM SERPL-MCNC: 12 MG/DL (ref 35–242)

## 2021-09-13 ENCOUNTER — TELEPHONE (OUTPATIENT)
Dept: ONCOLOGY | Facility: OTHER | Age: 71
End: 2021-09-13

## 2021-09-13 DIAGNOSIS — C90.00 MULTIPLE MYELOMA NOT HAVING ACHIEVED REMISSION (H): Primary | ICD-10-CM

## 2021-09-13 RX ORDER — HEPARIN SODIUM,PORCINE 10 UNIT/ML
5 VIAL (ML) INTRAVENOUS
Status: CANCELLED | OUTPATIENT
Start: 2021-09-18

## 2021-09-13 RX ORDER — NALOXONE HYDROCHLORIDE 0.4 MG/ML
.1-.4 INJECTION, SOLUTION INTRAMUSCULAR; INTRAVENOUS; SUBCUTANEOUS
Status: CANCELLED | OUTPATIENT
Start: 2021-09-22

## 2021-09-13 RX ORDER — ALBUTEROL SULFATE 90 UG/1
1-2 AEROSOL, METERED RESPIRATORY (INHALATION)
Status: CANCELLED
Start: 2021-09-18

## 2021-09-13 RX ORDER — HEPARIN SODIUM (PORCINE) LOCK FLUSH IV SOLN 100 UNIT/ML 100 UNIT/ML
5 SOLUTION INTRAVENOUS
Status: CANCELLED | OUTPATIENT
Start: 2021-09-22

## 2021-09-13 RX ORDER — MEPERIDINE HYDROCHLORIDE 25 MG/ML
25 INJECTION INTRAMUSCULAR; INTRAVENOUS; SUBCUTANEOUS EVERY 30 MIN PRN
Status: CANCELLED | OUTPATIENT
Start: 2021-09-15

## 2021-09-13 RX ORDER — MEPERIDINE HYDROCHLORIDE 25 MG/ML
25 INJECTION INTRAMUSCULAR; INTRAVENOUS; SUBCUTANEOUS EVERY 30 MIN PRN
Status: CANCELLED | OUTPATIENT
Start: 2021-09-22

## 2021-09-13 RX ORDER — EPINEPHRINE 1 MG/ML
0.3 INJECTION, SOLUTION, CONCENTRATE INTRAVENOUS EVERY 5 MIN PRN
Status: CANCELLED | OUTPATIENT
Start: 2021-09-25

## 2021-09-13 RX ORDER — METHYLPREDNISOLONE SODIUM SUCCINATE 125 MG/2ML
125 INJECTION, POWDER, LYOPHILIZED, FOR SOLUTION INTRAMUSCULAR; INTRAVENOUS
Status: CANCELLED
Start: 2021-09-15

## 2021-09-13 RX ORDER — SODIUM CHLORIDE 9 MG/ML
1000 INJECTION, SOLUTION INTRAVENOUS CONTINUOUS PRN
Status: CANCELLED
Start: 2021-09-15

## 2021-09-13 RX ORDER — HEPARIN SODIUM,PORCINE 10 UNIT/ML
5 VIAL (ML) INTRAVENOUS
Status: CANCELLED | OUTPATIENT
Start: 2021-09-22

## 2021-09-13 RX ORDER — ALBUTEROL SULFATE 0.83 MG/ML
2.5 SOLUTION RESPIRATORY (INHALATION)
Status: CANCELLED | OUTPATIENT
Start: 2021-09-25

## 2021-09-13 RX ORDER — SODIUM CHLORIDE 9 MG/ML
1000 INJECTION, SOLUTION INTRAVENOUS CONTINUOUS PRN
Status: CANCELLED
Start: 2021-09-18

## 2021-09-13 RX ORDER — LORAZEPAM 2 MG/ML
0.5 INJECTION INTRAMUSCULAR EVERY 4 HOURS PRN
Status: CANCELLED | OUTPATIENT
Start: 2021-09-15

## 2021-09-13 RX ORDER — EPINEPHRINE 1 MG/ML
0.3 INJECTION, SOLUTION, CONCENTRATE INTRAVENOUS EVERY 5 MIN PRN
Status: CANCELLED | OUTPATIENT
Start: 2021-09-18

## 2021-09-13 RX ORDER — MEPERIDINE HYDROCHLORIDE 25 MG/ML
25 INJECTION INTRAMUSCULAR; INTRAVENOUS; SUBCUTANEOUS EVERY 30 MIN PRN
Status: CANCELLED | OUTPATIENT
Start: 2021-09-25

## 2021-09-13 RX ORDER — MEPERIDINE HYDROCHLORIDE 25 MG/ML
25 INJECTION INTRAMUSCULAR; INTRAVENOUS; SUBCUTANEOUS EVERY 30 MIN PRN
Status: CANCELLED | OUTPATIENT
Start: 2021-09-18

## 2021-09-13 RX ORDER — HEPARIN SODIUM (PORCINE) LOCK FLUSH IV SOLN 100 UNIT/ML 100 UNIT/ML
5 SOLUTION INTRAVENOUS
Status: CANCELLED | OUTPATIENT
Start: 2021-09-25

## 2021-09-13 RX ORDER — HEPARIN SODIUM (PORCINE) LOCK FLUSH IV SOLN 100 UNIT/ML 100 UNIT/ML
5 SOLUTION INTRAVENOUS
Status: CANCELLED | OUTPATIENT
Start: 2021-09-18

## 2021-09-13 RX ORDER — DIPHENHYDRAMINE HYDROCHLORIDE 50 MG/ML
50 INJECTION INTRAMUSCULAR; INTRAVENOUS
Status: CANCELLED
Start: 2021-09-22

## 2021-09-13 RX ORDER — METHYLPREDNISOLONE SODIUM SUCCINATE 125 MG/2ML
125 INJECTION, POWDER, LYOPHILIZED, FOR SOLUTION INTRAMUSCULAR; INTRAVENOUS
Status: CANCELLED
Start: 2021-09-18

## 2021-09-13 RX ORDER — ACETAMINOPHEN 325 MG/1
650 TABLET ORAL ONCE
Status: CANCELLED | OUTPATIENT
Start: 2021-09-15

## 2021-09-13 RX ORDER — ALBUTEROL SULFATE 90 UG/1
1-2 AEROSOL, METERED RESPIRATORY (INHALATION)
Status: CANCELLED
Start: 2021-09-22

## 2021-09-13 RX ORDER — HEPARIN SODIUM (PORCINE) LOCK FLUSH IV SOLN 100 UNIT/ML 100 UNIT/ML
5 SOLUTION INTRAVENOUS
Status: CANCELLED | OUTPATIENT
Start: 2021-09-15

## 2021-09-13 RX ORDER — DIPHENHYDRAMINE HYDROCHLORIDE 50 MG/ML
50 INJECTION INTRAMUSCULAR; INTRAVENOUS
Status: CANCELLED
Start: 2021-09-18

## 2021-09-13 RX ORDER — LORAZEPAM 2 MG/ML
0.5 INJECTION INTRAMUSCULAR EVERY 4 HOURS PRN
Status: CANCELLED | OUTPATIENT
Start: 2021-09-18

## 2021-09-13 RX ORDER — EPINEPHRINE 1 MG/ML
0.3 INJECTION, SOLUTION, CONCENTRATE INTRAVENOUS EVERY 5 MIN PRN
Status: CANCELLED | OUTPATIENT
Start: 2021-09-22

## 2021-09-13 RX ORDER — METHYLPREDNISOLONE SODIUM SUCCINATE 125 MG/2ML
125 INJECTION, POWDER, LYOPHILIZED, FOR SOLUTION INTRAMUSCULAR; INTRAVENOUS
Status: CANCELLED
Start: 2021-09-22

## 2021-09-13 RX ORDER — SODIUM CHLORIDE 9 MG/ML
1000 INJECTION, SOLUTION INTRAVENOUS CONTINUOUS PRN
Status: CANCELLED
Start: 2021-09-22

## 2021-09-13 RX ORDER — HEPARIN SODIUM,PORCINE 10 UNIT/ML
5 VIAL (ML) INTRAVENOUS
Status: CANCELLED | OUTPATIENT
Start: 2021-09-15

## 2021-09-13 RX ORDER — SODIUM CHLORIDE 9 MG/ML
1000 INJECTION, SOLUTION INTRAVENOUS CONTINUOUS PRN
Status: CANCELLED
Start: 2021-09-25

## 2021-09-13 RX ORDER — NALOXONE HYDROCHLORIDE 0.4 MG/ML
.1-.4 INJECTION, SOLUTION INTRAMUSCULAR; INTRAVENOUS; SUBCUTANEOUS
Status: CANCELLED | OUTPATIENT
Start: 2021-09-15

## 2021-09-13 RX ORDER — HEPARIN SODIUM,PORCINE 10 UNIT/ML
5 VIAL (ML) INTRAVENOUS
Status: CANCELLED | OUTPATIENT
Start: 2021-09-25

## 2021-09-13 RX ORDER — ALBUTEROL SULFATE 90 UG/1
1-2 AEROSOL, METERED RESPIRATORY (INHALATION)
Status: CANCELLED
Start: 2021-09-25

## 2021-09-13 RX ORDER — LORAZEPAM 2 MG/ML
0.5 INJECTION INTRAMUSCULAR EVERY 4 HOURS PRN
Status: CANCELLED | OUTPATIENT
Start: 2021-09-25

## 2021-09-13 RX ORDER — DIPHENHYDRAMINE HYDROCHLORIDE 50 MG/ML
50 INJECTION INTRAMUSCULAR; INTRAVENOUS
Status: CANCELLED
Start: 2021-09-15

## 2021-09-13 RX ORDER — LORAZEPAM 2 MG/ML
0.5 INJECTION INTRAMUSCULAR EVERY 4 HOURS PRN
Status: CANCELLED | OUTPATIENT
Start: 2021-09-22

## 2021-09-13 RX ORDER — ALBUTEROL SULFATE 0.83 MG/ML
2.5 SOLUTION RESPIRATORY (INHALATION)
Status: CANCELLED | OUTPATIENT
Start: 2021-09-18

## 2021-09-13 RX ORDER — DIPHENHYDRAMINE HCL 50 MG
50 CAPSULE ORAL ONCE
Status: CANCELLED | OUTPATIENT
Start: 2021-09-15

## 2021-09-13 RX ORDER — NALOXONE HYDROCHLORIDE 0.4 MG/ML
.1-.4 INJECTION, SOLUTION INTRAMUSCULAR; INTRAVENOUS; SUBCUTANEOUS
Status: CANCELLED | OUTPATIENT
Start: 2021-09-18

## 2021-09-13 RX ORDER — ALBUTEROL SULFATE 0.83 MG/ML
2.5 SOLUTION RESPIRATORY (INHALATION)
Status: CANCELLED | OUTPATIENT
Start: 2021-09-22

## 2021-09-13 RX ORDER — METHYLPREDNISOLONE SODIUM SUCCINATE 125 MG/2ML
125 INJECTION, POWDER, LYOPHILIZED, FOR SOLUTION INTRAMUSCULAR; INTRAVENOUS
Status: CANCELLED
Start: 2021-09-25

## 2021-09-13 RX ORDER — ALBUTEROL SULFATE 0.83 MG/ML
2.5 SOLUTION RESPIRATORY (INHALATION)
Status: CANCELLED | OUTPATIENT
Start: 2021-09-15

## 2021-09-13 RX ORDER — DIPHENHYDRAMINE HYDROCHLORIDE 50 MG/ML
50 INJECTION INTRAMUSCULAR; INTRAVENOUS
Status: CANCELLED
Start: 2021-09-25

## 2021-09-13 RX ORDER — ALBUTEROL SULFATE 90 UG/1
1-2 AEROSOL, METERED RESPIRATORY (INHALATION)
Status: CANCELLED
Start: 2021-09-15

## 2021-09-13 RX ORDER — EPINEPHRINE 1 MG/ML
0.3 INJECTION, SOLUTION, CONCENTRATE INTRAVENOUS EVERY 5 MIN PRN
Status: CANCELLED | OUTPATIENT
Start: 2021-09-15

## 2021-09-13 RX ORDER — NALOXONE HYDROCHLORIDE 0.4 MG/ML
.1-.4 INJECTION, SOLUTION INTRAMUSCULAR; INTRAVENOUS; SUBCUTANEOUS
Status: CANCELLED | OUTPATIENT
Start: 2021-09-25

## 2021-09-13 NOTE — TELEPHONE ENCOUNTER
Patient here for labs, you, and then treatment tomorrow. Her labs will be done peripherally in ONC or by lab prior to her arriving to us, but wanted to alert that there are no further treatment plan orders and no labs in open orders.

## 2021-09-14 ENCOUNTER — ONCOLOGY VISIT (OUTPATIENT)
Dept: ONCOLOGY | Facility: OTHER | Age: 71
End: 2021-09-14
Attending: NURSE PRACTITIONER
Payer: MEDICARE

## 2021-09-14 ENCOUNTER — APPOINTMENT (OUTPATIENT)
Dept: LAB | Facility: OTHER | Age: 71
End: 2021-09-14
Attending: NURSE PRACTITIONER
Payer: MEDICARE

## 2021-09-14 ENCOUNTER — INFUSION THERAPY VISIT (OUTPATIENT)
Dept: INFUSION THERAPY | Facility: OTHER | Age: 71
End: 2021-09-14
Attending: INTERNAL MEDICINE
Payer: MEDICARE

## 2021-09-14 VITALS
WEIGHT: 168.43 LBS | TEMPERATURE: 98.2 F | OXYGEN SATURATION: 99 % | HEART RATE: 84 BPM | SYSTOLIC BLOOD PRESSURE: 120 MMHG | RESPIRATION RATE: 20 BRPM | DIASTOLIC BLOOD PRESSURE: 80 MMHG | BODY MASS INDEX: 29.84 KG/M2 | HEIGHT: 63 IN

## 2021-09-14 VITALS
TEMPERATURE: 98.2 F | DIASTOLIC BLOOD PRESSURE: 68 MMHG | SYSTOLIC BLOOD PRESSURE: 134 MMHG | HEIGHT: 63 IN | OXYGEN SATURATION: 99 % | WEIGHT: 168.43 LBS | HEART RATE: 80 BPM | RESPIRATION RATE: 20 BRPM | BODY MASS INDEX: 29.84 KG/M2

## 2021-09-14 DIAGNOSIS — M89.9 BONE LESION: ICD-10-CM

## 2021-09-14 DIAGNOSIS — C90.00 MULTIPLE MYELOMA NOT HAVING ACHIEVED REMISSION (H): Primary | ICD-10-CM

## 2021-09-14 LAB
ALBUMIN SERPL-MCNC: 3.3 G/DL (ref 3.4–5)
ALP SERPL-CCNC: 115 U/L (ref 40–150)
ALT SERPL W P-5'-P-CCNC: 15 U/L (ref 0–50)
ANION GAP SERPL CALCULATED.3IONS-SCNC: 5 MMOL/L (ref 3–14)
AST SERPL W P-5'-P-CCNC: 11 U/L (ref 0–45)
BASOPHILS # BLD AUTO: 0 10E3/UL (ref 0–0.2)
BASOPHILS NFR BLD AUTO: 1 %
BILIRUB SERPL-MCNC: 0.4 MG/DL (ref 0.2–1.3)
BUN SERPL-MCNC: 14 MG/DL (ref 7–30)
CALCIUM SERPL-MCNC: 9 MG/DL (ref 8.5–10.1)
CHLORIDE BLD-SCNC: 102 MMOL/L (ref 94–109)
CO2 SERPL-SCNC: 28 MMOL/L (ref 20–32)
CREAT SERPL-MCNC: 0.79 MG/DL (ref 0.52–1.04)
EOSINOPHIL # BLD AUTO: 0.5 10E3/UL (ref 0–0.7)
EOSINOPHIL NFR BLD AUTO: 9 %
ERYTHROCYTE [DISTWIDTH] IN BLOOD BY AUTOMATED COUNT: 14.4 % (ref 10–15)
GFR SERPL CREATININE-BSD FRML MDRD: 75 ML/MIN/1.73M2
GLUCOSE BLD-MCNC: 134 MG/DL (ref 70–99)
HCT VFR BLD AUTO: 38 % (ref 35–47)
HGB BLD-MCNC: 12.8 G/DL (ref 11.7–15.7)
IMM GRANULOCYTES # BLD: 0.1 10E3/UL
IMM GRANULOCYTES NFR BLD: 1 %
LYMPHOCYTES # BLD AUTO: 1 10E3/UL (ref 0.8–5.3)
LYMPHOCYTES NFR BLD AUTO: 18 %
MCH RBC QN AUTO: 31.1 PG (ref 26.5–33)
MCHC RBC AUTO-ENTMCNC: 33.7 G/DL (ref 31.5–36.5)
MCV RBC AUTO: 93 FL (ref 78–100)
MONOCYTES # BLD AUTO: 0.8 10E3/UL (ref 0–1.3)
MONOCYTES NFR BLD AUTO: 13 %
NEUTROPHILS # BLD AUTO: 3.3 10E3/UL (ref 1.6–8.3)
NEUTROPHILS NFR BLD AUTO: 58 %
NRBC # BLD AUTO: 0 10E3/UL
NRBC BLD AUTO-RTO: 0 /100
PLATELET # BLD AUTO: 252 10E3/UL (ref 150–450)
POTASSIUM BLD-SCNC: 4 MMOL/L (ref 3.4–5.3)
PROT SERPL-MCNC: 7 G/DL (ref 6.8–8.8)
RBC # BLD AUTO: 4.11 10E6/UL (ref 3.8–5.2)
SODIUM SERPL-SCNC: 135 MMOL/L (ref 133–144)
WBC # BLD AUTO: 5.8 10E3/UL (ref 4–11)

## 2021-09-14 PROCEDURE — 96365 THER/PROPH/DIAG IV INF INIT: CPT

## 2021-09-14 PROCEDURE — G0463 HOSPITAL OUTPT CLINIC VISIT: HCPCS | Mod: 25

## 2021-09-14 PROCEDURE — 96367 TX/PROPH/DG ADDL SEQ IV INF: CPT

## 2021-09-14 PROCEDURE — 250N000011 HC RX IP 250 OP 636: Performed by: NURSE PRACTITIONER

## 2021-09-14 PROCEDURE — 250N000013 HC RX MED GY IP 250 OP 250 PS 637: Performed by: NURSE PRACTITIONER

## 2021-09-14 PROCEDURE — 36415 COLL VENOUS BLD VENIPUNCTURE: CPT | Mod: ZL | Performed by: NURSE PRACTITIONER

## 2021-09-14 PROCEDURE — 96401 CHEMO ANTI-NEOPL SQ/IM: CPT | Mod: 59

## 2021-09-14 PROCEDURE — 82040 ASSAY OF SERUM ALBUMIN: CPT | Mod: ZL | Performed by: NURSE PRACTITIONER

## 2021-09-14 PROCEDURE — 258N000003 HC RX IP 258 OP 636: Performed by: NURSE PRACTITIONER

## 2021-09-14 PROCEDURE — 99214 OFFICE O/P EST MOD 30 MIN: CPT | Performed by: NURSE PRACTITIONER

## 2021-09-14 PROCEDURE — 85025 COMPLETE CBC W/AUTO DIFF WBC: CPT | Mod: ZL | Performed by: NURSE PRACTITIONER

## 2021-09-14 PROCEDURE — G0463 HOSPITAL OUTPT CLINIC VISIT: HCPCS

## 2021-09-14 RX ORDER — ZOLEDRONIC ACID 0.04 MG/ML
4 INJECTION, SOLUTION INTRAVENOUS ONCE
Status: COMPLETED | OUTPATIENT
Start: 2021-09-14 | End: 2021-09-14

## 2021-09-14 RX ORDER — CYST/ALA/Q10/PHOS.SER/DHA/BROC 100-20-50
POWDER (GRAM) ORAL DAILY
COMMUNITY

## 2021-09-14 RX ORDER — ACETAMINOPHEN 325 MG/1
650 TABLET ORAL ONCE
Status: COMPLETED | OUTPATIENT
Start: 2021-09-14 | End: 2021-09-14

## 2021-09-14 RX ORDER — ZOLEDRONIC ACID 0.04 MG/ML
4 INJECTION, SOLUTION INTRAVENOUS ONCE
Status: CANCELLED | OUTPATIENT
Start: 2021-09-14 | End: 2021-09-14

## 2021-09-14 RX ORDER — DIPHENHYDRAMINE HCL 50 MG
50 CAPSULE ORAL ONCE
Status: COMPLETED | OUTPATIENT
Start: 2021-09-14 | End: 2021-09-14

## 2021-09-14 RX ADMIN — ACETAMINOPHEN 650 MG: 325 TABLET, FILM COATED ORAL at 13:25

## 2021-09-14 RX ADMIN — BORTEZOMIB 2.4 MG: 3.5 INJECTION, POWDER, LYOPHILIZED, FOR SOLUTION INTRAVENOUS; SUBCUTANEOUS at 14:12

## 2021-09-14 RX ADMIN — DIPHENHYDRAMINE HYDROCHLORIDE 50 MG: 50 CAPSULE ORAL at 13:24

## 2021-09-14 RX ADMIN — SODIUM CHLORIDE 250 ML: 9 INJECTION, SOLUTION INTRAVENOUS at 14:04

## 2021-09-14 RX ADMIN — ZOLEDRONIC ACID 4 MG: 0.04 INJECTION, SOLUTION INTRAVENOUS at 14:05

## 2021-09-14 RX ADMIN — DARATUMUMAB AND HYALURONIDASE-FIHJ (HUMAN RECOMBINANT) 1800 MG: 1800; 30000 INJECTION SUBCUTANEOUS at 14:18

## 2021-09-14 ASSESSMENT — PAIN SCALES - GENERAL
PAINLEVEL: NO PAIN (0)
PAINLEVEL: NO PAIN (0)

## 2021-09-14 ASSESSMENT — MIFFLIN-ST. JEOR
SCORE: 1243.13
SCORE: 1243

## 2021-09-14 NOTE — NURSING NOTE
"Oncology Rooming Note    September 14, 2021 12:49 PM   Carmelita Watts is a 70 year old female who presents for:    Chief Complaint   Patient presents with     Oncology Clinic Visit     Follow up Multiple myeloma not having achieved remission      Initial Vitals: /80   Pulse 84   Temp 98.2  F (36.8  C) (Tympanic)   Resp 20   Ht 1.6 m (5' 3\")   Wt 76.4 kg (168 lb 6.9 oz)   SpO2 99%   BMI 29.84 kg/m   Estimated body mass index is 29.84 kg/m  as calculated from the following:    Height as of this encounter: 1.6 m (5' 3\").    Weight as of this encounter: 76.4 kg (168 lb 6.9 oz). Body surface area is 1.84 meters squared.  No Pain (0) Comment: Data Unavailable   No LMP recorded. Patient is postmenopausal.  Allergies reviewed: Yes  Medications reviewed: Yes    Medications: Medication refills not needed today.  Pharmacy name entered into [facility]:    [facility] #[id number] [place], [place] - [address] AT [facility] [address]  [facility] - [place], [place] - [address]            "

## 2021-09-14 NOTE — PROGRESS NOTES
Patient is 70 years old, here today accompanied by self for injection of Velcade and FASPRO per order of Dr Walker.     Component      Latest Ref Rng & Units 9/14/2021   WBC      4.0 - 11.0 10e3/uL 5.8   RBC Count      3.80 - 5.20 10e6/uL 4.11   Hemoglobin      11.7 - 15.7 g/dL 12.8   Hematocrit      35.0 - 47.0 % 38.0   MCV      78 - 100 fL 93   MCH      26.5 - 33.0 pg 31.1   MCHC      31.5 - 36.5 g/dL 33.7   RDW      10.0 - 15.0 % 14.4   Platelet Count      150 - 450 10e3/uL 252   % Neutrophils      % 58   % Lymphocytes      % 18   % Monocytes      % 13   % Eosinophils      % 9   % Basophils      % 1   % Immature Granulocytes      % 1   NRBCs per 100 WBC      <1 /100 0   Absolute Neutrophils      1.6 - 8.3 10e3/uL 3.3   Absolute Lymphocytes      0.8 - 5.3 10e3/uL 1.0   Absolute Monocytes      0.0 - 1.3 10e3/uL 0.8   Absolute Eosinophils      0.0 - 0.7 10e3/uL 0.5   Absolute Basophils      0.0 - 0.2 10e3/uL 0.0   Absolute Immature Granulocytes      <=0.0 10e3/uL 0.1 (H)   Absolute NRBCs      10e3/uL 0.0       Anne GUSMAN took verbal from Bonita Quintana with ok to release treatment for preparation/administration, despite no CMP as there are no parameters related to and there is a significant delay in lab for CMP.     Labs meet parameters for today's injection.     Patient denies questions nor concerns regarding medication, administration site, side effects, nor aftercare.  Patient identified with two identifiers, order verified, and verbal consent for today's injection obtained from patient.   Patient education provided on s/s of injection site infection, and/or medication specific side effects, and when to call a provider.  Patient instructed to report any adverse effects.     FASPRO administered per protocol in right abdomen, 3 inches from umbilicus as per med orders, at a 45 degree angle.  Site covered with sterile bandage.  Patient tolerated injection well, no verbal nor non-verbal signs of discomfort noted.  No  adverse effects noted at this time.     Velcade administered per protocol in right upper outer arm at a 45 degree angle.  Site covered with sterile bandage.  Patient tolerated injection well, no verbal nor non-verbal signs of discomfort noted.  No adverse effects noted at this time.     Patient is due for her Zometa today per ONC as well. Per parameters in plan, ok to use labs within 2 months of dose. Will use labs from last CMP, see below. Pharmacy updated. Note also placed in treatment plan alerting to zometa plan and need to ensure timely admin.     Component      Latest Ref Rng & Units 8/23/2021   Sodium      133 - 144 mmol/L 137   Potassium      3.4 - 5.3 mmol/L 3.8   Chloride      94 - 109 mmol/L 106   Carbon Dioxide      20 - 32 mmol/L 25   Anion Gap      3 - 14 mmol/L 6   Urea Nitrogen      7 - 30 mg/dL 19   Creatinine      0.52 - 1.04 mg/dL 0.79   Calcium      8.5 - 10.1 mg/dL 8.9   Glucose      70 - 99 mg/dL 110 (H)   Alkaline Phosphatase      40 - 150 U/L 137   AST      0 - 45 U/L 12   ALT      0 - 50 U/L 17   Protein Total      6.8 - 8.8 g/dL 7.0   Albumin      3.4 - 5.0 g/dL 3.4   Bilirubin Total      0.2 - 1.3 mg/dL 0.4   GFR Estimate      >60 mL/min/1.73m2 75          22 gauge angio cath inserted into left antecubital.  Immediate blood return noted.  IV secured with sterile, transparent dressing and tape.  Patient tolerated well, denies pain or discomfort at this time.  Flushes easily without resistance, no signs or symptoms of infiltration or infection.   Patient denies questions or concerns regarding infusion and/or medication(s) being administered.    IV pump verified with dose, drug, and rate of administration.  Infusion administered per protocol.  Patient tolerated infusion well, no signs or symptoms of adverse reaction noted.  Patient denies pain nor discomfort.     IV removed, catheter intact.  Site clean, dry and intact.  No signs or symptoms of infiltration or infection.  Covered with a  sterile bandage, slight pressure applied for 30 seconds.  Pt instructed to leave bandage intact for a minimum of one hour, and to call with questions or concerns. Patient states understanding, discharged.

## 2021-09-14 NOTE — PATIENT INSTRUCTIONS
We would like to see you back in per your schedule.    When you are in need of a refill, please call your pharmacy and they will send us a request.     If you have any questions please call [phone]    Other instructions:  none

## 2021-09-14 NOTE — PROGRESS NOTES
Oncology Follow-up Visit:  September 14, 2021    Reason for Visit:  Patient presents with:  Oncology Clinic Visit: Follow up Multiple myeloma not having achieved remission      Nursing Note and documentation reviewed: yes    HPI:  This is a 70-year-old female patient who presents to the oncology clinic today for evaluation prior to receiving cycle 13 day 1 therapy for Stage II-RISS IgG multiple myeloma diagnosed June 2019.  She progressed on Velcade and dexamethasone and CyBorD and treated was changed to Darzalex faspro injection. M-spike went from 1.0 to 1.2 in March so Velcade and dex were added to treatment plan.    She presents to the clinic today stating she is doing well overall and offers no complaints today.  She does have some ongoing mild fatigue rating this at about a 4 on a 0-to-10 scale.  She also tells me she has been having some abdominal pain and increased constipation when she eats salad or apples.  She increased her stool softener to twice a day.  Interestingly, she tells me she has had 2 episodes over the past few months where she had a day where she had worked fairly hard and then slept very hard and in 1 position and woke up with extreme generalized pain in what she describes as her bones and her muscles.  She has no specific areas of pain or discomfort now.    Oncologic History:     12/31/2018   patient presented to the emergency room with vertigo and fatigue.  CT scan of the head was negative and subsequent stress test was negative.  5/3/2019  She was seen by her PCP who ordered lab work and noted a total protein of 12.9.  SPEP at that time showed an M spike of 6.2 with a large monoclonal protein seen in the gamma fraction.  Urine immunofixation showed a possible small protein band in the gamma fraction  5/31/2019  she was evaluated by Dr. Walker with Medical Oncology with plan to rule out myeloma and obtain bone marrow aspiration biopsy as well as a metastatic bone survey along with  additional labwork  6/18/2019 she underwent bone marrow aspiration and biopsy  6/24/2019  She was seen again by Dr. Walker and CBC showed a hemoglobin of 9.3, M spike 7.3 with monoclonal IgG immunoglobulin of kappa light chain type; serum viscosity was 2.9; quantitative immunoglobulins showed an IgG of 8160, beta-2 microglobulin was 5.8, BUN was 21 with creatinine is 0.8 and total protein was 13.7.  Quantitative kappa/lambda free light chains showed an elevated ratio of 17.0 bone marrow aspiration biopsy showed plasma cell myeloma with approximately 80% plasma cells.  Immunofixation showed IgG kappa and flow cytometry revealed kappa monotypic plasma cells consistent with clonal plasma cell neoplasm and FISH panel was pending at that time.  It was felt she had at least stage II disease based on her beta-2 microglobulin and anemia.  Plan was to treat with Velcade 1.3 mg per metered squared days 1, 4, 8 and 11/Decadron 40 mg on days 1, 8 and 15 initiation of Revlimid with the second cycle at 25 mg daily days 1 through 14.  Plan was to also obtain an MRI of the lumbar spine to rule out lytic lesion at L3.  She was initiated on Zovirax 400 mg p.o. twice daily.  6/25/2019  1st cycle of chemotherapy initiated  7/1/2019 note in chart regarding patient's large co-pay for the Revlimid and no plan at this point to initiate Revlimid and treat only with Velcade and Decadron per Dr. Walker  7/11/2019  MRI lumbar spine shows a pathologic superior endplate compression fracture at L3 without evidence of retropulsed fragment and innumerable enhancing lesions throughout the lumbar spine consistent with history of multiple myeloma.  9/10/2019  Increasing M-spike and kappa lambda ratio  10/1/2019  Initiation of CyBorD  11/17/2021  Stabilization of M-spike at 1.2  12/1/2020  Initiation of Darzalex Faspro injection  3/23/2021  M-spike increased form 1.0 to 1.2 and velcade and dexamethasone added to plan     Current Chemo  "[place]:  Darzalex faspro injection 1800mg subcutaneous per protocol with velcade 1.3mg/m2 and dexamethasone 20mg added to day 4, 8, 11 with cycle given every 21 days      **Zometa 4mg every 3 months  Current Cycle: 13 day 1 (cycle 5 added velcade dex)  # of completed cycles:  12     Previous treatment:   Velcade 1.3 mg/m2 days 1, 4, 8 and 11 with Decadron 40 mg days 1, 8 and 15 x 4 cycles;   Velcade 1.5mg.m2/cyclophosphamide 150mg every 7 days on days 1,8,15 and 22/decadron 40mg days 1,8,15,22 ; Darzalex faspro injection 1800mg subcutaneous per protocol    Past Medical History:   Diagnosis Date     Arthritis      Depressive disorder      Diabetes mellitus, type 2 (H) 1/18/2021     Essential hypertension 10/1/2015     Major depressive disorder, recurrent episode, mild (H) 10/1/2015     Mixed hyperlipidemia 10/1/2015     Multiple myeloma not having achieved remission (H) 6/24/2019     Other specified disorders of bladder 07/09/2012    Irritable Bladder     Seasonal allergies 10/1/2015     Unspecified essential hypertension 03/19/2007     Unspecified sinusitis (chronic) 09/05/2007       Past Surgical History:   Procedure Laterality Date     APPENDECTOMY       BONE MARROW BIOPSY, BONE SPECIMEN, NEEDLE/TROCAR N/A 6/18/2019    Procedure: BONE MARROW BIOPSY;  Surgeon: Maciej Sanz MD;  Location: [facility]     CHOLECYSTECTOMY       COLONOSCOPY  07-    repeat 10 years     COLONOSCOPY N/A 12/30/2016    Procedure: COLONOSCOPY;  Surgeon: Bhaskar Franklin DO;  Location: [facility]     SINUS SURGERY       TUBAL STERILIZATION         Family History   Problem Relation Age of Onset     Breast Cancer Mother 66        Cause of Death     Parkinsonism Father         \"Possible\"     Coronary Artery Disease Father      Pacemaker Father      Thyroid Disease Daughter      Diabetes No family hx of      Hypertension No family hx of      Hyperlipidemia No family hx of      Cerebrovascular Disease No family hx of      Colon Cancer No " family hx of      Prostate Cancer No family hx of      Genetic Disorder No family hx of      Asthma No family hx of      Anesthesia Reaction No family hx of        Social History     Socioeconomic History     Marital status:      Spouse name: Not on file     Number of children: Not on file     Years of education: Not on file     Highest education level: Not on file   Occupational History     Occupation: Financial     Comment:  - ([employer])   Tobacco Use     Smoking status: Never Smoker     Smokeless tobacco: Never Used     Tobacco comment: Tried to Quit (YES); QUIT in 1971; Passive Exposure (NO)   Substance and Sexual Activity     Alcohol use: Yes     Comment: RARELY     Drug use: No     Sexual activity: Yes     Partners: Male     Birth control/protection: None   Other Topics Concern      Service Not Asked     Blood Transfusions Not Asked     Caffeine Concern Yes     Comment: Coffee >6 cups daily     Occupational Exposure Not Asked     Hobby Hazards Not Asked     Sleep Concern Not Asked     Stress Concern Not Asked     Weight Concern Not Asked     Special Diet Not Asked     Back Care Not Asked     Exercise Not Asked     Bike Helmet Not Asked     Seat Belt Not Asked     Self-Exams Not Asked     Parent/sibling w/ CABG, MI or angioplasty before 65F 55M? No   Social History Narrative     Not on file     Social Determinants of Health     Financial Resource Strain:      Difficulty of Paying Living Expenses:    Food Insecurity:      Worried About Running Out of Food in the Last Year:      Ran Out of Food in the Last Year:    Transportation Needs:      Lack of Transportation (Medical):      Lack of Transportation (Non-Medical):    Physical Activity:      Days of Exercise per Week:      Minutes of Exercise per Session:    Stress:      Feeling of Stress :    Social Connections:      Frequency of Communication with Friends and Family:      Frequency of Social Gatherings with Friends and Family:       Attends Mandaeism Services:      Active Member of Clubs or Organizations:      Attends Club or Organization Meetings:      Marital Status:    Intimate Partner Violence:      Fear of Current or Ex-Partner:      Emotionally Abused:      Physically Abused:      Sexually Abused:        Current Outpatient Medications   Medication     acyclovir (ZOVIRAX) 400 MG tablet     aspirin (ASA) 81 MG chewable tablet     atorvastatin (LIPITOR) 10 MG tablet     dexamethasone (DECADRON) 4 MG tablet     fluticasone (FLONASE) 50 MCG/ACT nasal spray     L-Lysine HCl 500 MG CAPS     losartan (COZAAR) 50 MG tablet     sertraline (ZOLOFT) 50 MG tablet     EPINEPHrine (EPIPEN) 0.3 MG/0.3ML injection     prochlorperazine (COMPAZINE) 10 MG tablet     No current facility-administered medications for this visit.        Allergies   Allergen Reactions     Lisinopril Cough     Phenylephrine Hcl Other (See Comments)     **Entex  HEADACHE (SEVERE)     Phenylpropanolamine Other (See Comments)     **Entex  HEADACHE (SEVERE)     Pseudoephedrine Tannate Other (See Comments)     **Entex  HEADACHE (SEVERE)     Levofloxacin Rash     **Levaquin     Moxifloxacin Hcl [Avelox] Rash       Review Of Systems:  Constitutional:    denies fever, weight changes, chills, and night sweats.  Eyes:    Denies double vision  Ears/Nose/Throat:   denies ear pain, nose problems, difficulty swallowing  Respiratory:   denies shortness of breath, cough   Skin:   denies rash, lesions  Cardiovascular:   denies chest pain, edema; occasional palpitations after velcade  Gastrointestinal:   See hPI  Genitourinary:   denies difficulty with urination, blood in urine  Musculoskeletal:    denies new muscle pain, bone pain-see hpi  Neurologic:   denies lightheadedness, headaches, numbness or tingling  Psychiatric:   denies anxiety, depression  Hematologic/Lymphatic/Immunologic:   denies easy bruising, easy bleeding, lumps or bumps noted  Endocrine:   Denies increased thirst      ECOG  "Performance Status: 1    Physical Exam:  /80   Pulse 84   Temp 98.2  F (36.8  C) (Tympanic)   Resp 20   Ht 1.6 m (5' 3\")   Wt 76.4 kg (168 lb 6.9 oz)   SpO2 99%   BMI 29.84 kg/m      GENERAL APPEARANCE: Healthy, alert and in no acute distress.  HEENT: Normocephalic, Sclerae anicteric.   NECK:   No asymmetry or masses, no thyromegaly.  LYMPHATICS: No palpable cervical, supraclavicular, axillary, or inguinal nodes   RESP: Lungs clear to auscultation bilaterally, respirations regular and easy  CARDIOVASCULAR: Regular rate and rhythm. Normal S1, S2; no murmur, gallop, or rub.  ABDOMEN: Soft, nontender. Bowel sounds auscultated all 4 quadrants. No palpable organomegaly or masses.  MUSCULOSKELETAL: Extremities without gross deformities noted. No edema of bilateral lower extremities.  NEURO: Alert and oriented x 3.  Gait steady.  PSYCHIATRIC: Mentation and affect appear normal.  Mood appropriate.    Laboratory:    Component      Latest Ref Rng & Units 9/7/2021   Albumin Fraction      3.7 - 5.1 g/dL 4.2   Alpha 1 Fraction      0.2 - 0.4 g/dL 0.4   Alpha 2 Fraction      0.5 - 0.9 g/dL 0.8   Beta Fraction      0.6 - 1.0 g/dL 0.7   Gamma Fraction      0.7 - 1.6 g/dL 0.9   Monoclonal Peak      <=0.0 g/dL 0.4 (H)   ELP Interpretation:       Fairly large monoclonal protein (about 0.4 g/dL) seen in the gamma fraction. See immunofixation report on same specimen. Pathologic significance requires clinical correlation. BIA Izaguirre M.D., Ph.D., Pathologist ().   [place] Free Light Chains      0.33 - 1.94 mg/dL 0.42   [place] FREE LT CHAINS      0.57 - 2.63 mg/dL 0.17 (L)   KAPPA/LAMBDA RATIO      0.26 - 1.65 2.47 (H)   IGG      610-1,616 mg/dL 948   IGA      84 - 499 mg/dL 9 (L)   IGM      35 - 242 mg/dL 12 (L)   Immunofixation ELP       Large monoclonal IgG immunoglobulin of kappa light chain type. Monoclonal antibody therapeutics (e.g. Daratumumab) may appear as monoclonal proteins on serum electrophoresis " and immunofixation although usually very small. Results should be interpreted with caution if the patient is receiving monoclonal antibody therapy. Pathological significance requires clinical correlation. BIA Izaguirre M.D., Ph.D.([phone])   Total Protein Serum      6.8 - 8.8 g/dL 6.8       Imaging Studies:  None completed for today's visit      ASSESSMENT/PLAN:    #1 Multiple myeloma:   IgG kappa multiple myeloma with 80% involvement of the bone marrow diagnosed June 2019 initially treated with Velcade and Decadron and received 4 cycles with increasing M-spike and kappa/lambda ratio.  Treatment changed to CyBorD on 10/1/2019.  M-spike plateau at 1.2 and treatment changed to monotherapy with Darzalex Faspro injection.     M spike declined to 1.0 then increased again to 1.2 so Velcade and Dex added to her treatment plan with cycle 5 therapy.  M-spike down to 0.4.  Will initiate cycle 13 and patient will follow up prior to cycle 14 with a CBC, CMP, SPEP, quantitative immunoglobulins, protein immunofixation and kappa lambda ratio.      #2 lytic lesion: She will continue with the Zometa every 3 months. She remains on calcium with vitamin D twice a day and dental exams at least every 6 months.     I encouraged patient to call with any questions or concerns.    30 minutes spent in the patient's encounter today with time spent in review of the chart along with in chart preparation.  Time was also spent in reviewing her treatment plan.Time was also spent obtaining a review of systems and performing a physical exam along with review of her labwork in detail.  Time was spent in planning her next follow up and in chart documentation.    Barbie Quintana, NP  APRN, FNP-BC, AOCNP

## 2021-09-16 ENCOUNTER — TELEPHONE (OUTPATIENT)
Dept: ONCOLOGY | Facility: OTHER | Age: 71
End: 2021-09-16

## 2021-09-16 ENCOUNTER — E-VISIT (OUTPATIENT)
Dept: FAMILY MEDICINE | Facility: OTHER | Age: 71
End: 2021-09-16
Attending: PHYSICIAN ASSISTANT
Payer: COMMERCIAL

## 2021-09-16 DIAGNOSIS — R05.9 COUGH: Primary | ICD-10-CM

## 2021-09-16 PROCEDURE — 99421 OL DIG E/M SVC 5-10 MIN: CPT | Performed by: PHYSICIAN ASSISTANT

## 2021-09-16 RX ORDER — BENZONATATE 200 MG/1
200 CAPSULE ORAL 3 TIMES DAILY PRN
Qty: 30 CAPSULE | Refills: 1 | Status: SHIPPED | OUTPATIENT
Start: 2021-09-16 | End: 2021-12-27

## 2021-09-16 NOTE — TELEPHONE ENCOUNTER
"Patient left message stating \"I just wanted to let someone know that I'm having some sinus drainage, and its causing a little cough.  Wanted to make sure I could still come tomorrow.\"  Routed to Oncology pool for nurse/provider assessment.  "

## 2021-09-16 NOTE — PATIENT INSTRUCTIONS
"  Dear Carmelita Watts    After reviewing your responses, I've been able to diagnose you with \"Bronchitis\" which is a common infection of your lungs that is nearly always caused by a virus. The virus causes swelling and irritation of the air passages of your lungs which leads to cough. The illness spreads from your nose and throat to your windpipe and airways. It is often called a \"chest cold\" and can last up to 2 weeks, but is not a serious illness. Exposure to cigarette smoke usually makes this significantly worse.      To treat bronchitis, the main thing to do is drink lots of fluids and rest. Cough medications over-the-counter such as mucinex, robitussin or \"cold and sinus\" medications can be helpful. Ibuprofen and Tylenol also help with fevers or aching feelings that you often have with this kind of illness. Do not take ibuprofen if you have kidney disease, stomach ulcers or allergy to aspirin.     Bronchitis is most often highly contagious as viruses are spread through the air or touch. Avoid contact with others who may become infected, particularly children, the elderly and those whose immune systems might be weak.     If your symptoms worsen, you develop chest pain or shortness of breath, fevers over 101, or are not improving in 5 days, please contact your primary care provider for an appointment or visit any of our convenient Walk-in Care or Urgent Care Centers to be seen which can be found on our website here.    Thanks again for choosing us as your health care partner,    VENKATESH Trevino  "

## 2021-09-16 NOTE — TELEPHONE ENCOUNTER
Provider E-Visit time total (minutes): 10       1. Cough  She has a dry cough for a few days. Has multiple Myeloma.  On chemo orally. Going to try some coughing tablets. And monitor if getting worse let me know.   - benzonatate (TESSALON) 200 MG capsule; Take 1 capsule (200 mg) by mouth 3 times daily as needed for cough  Dispense: 30 capsule; Refill: 1

## 2021-09-17 ENCOUNTER — INFUSION THERAPY VISIT (OUTPATIENT)
Dept: INFUSION THERAPY | Facility: OTHER | Age: 71
End: 2021-09-17
Attending: INTERNAL MEDICINE
Payer: MEDICARE

## 2021-09-17 VITALS
SYSTOLIC BLOOD PRESSURE: 139 MMHG | OXYGEN SATURATION: 97 % | WEIGHT: 169.31 LBS | HEART RATE: 81 BPM | HEIGHT: 63 IN | TEMPERATURE: 98.2 F | BODY MASS INDEX: 30 KG/M2 | DIASTOLIC BLOOD PRESSURE: 94 MMHG

## 2021-09-17 DIAGNOSIS — C90.00 MULTIPLE MYELOMA NOT HAVING ACHIEVED REMISSION (H): Primary | ICD-10-CM

## 2021-09-17 PROCEDURE — 250N000011 HC RX IP 250 OP 636: Mod: JW | Performed by: NURSE PRACTITIONER

## 2021-09-17 PROCEDURE — 96401 CHEMO ANTI-NEOPL SQ/IM: CPT

## 2021-09-17 RX ADMIN — BORTEZOMIB 2.4 MG: 3.5 INJECTION, POWDER, LYOPHILIZED, FOR SOLUTION INTRAVENOUS; SUBCUTANEOUS at 13:10

## 2021-09-17 ASSESSMENT — MIFFLIN-ST. JEOR: SCORE: 1247

## 2021-09-17 NOTE — PROGRESS NOTES
Patient is 70 year old, here today for injection of Velcade per order of Dr Walker     Labs meet parameters for today's injection.     Patient denies questions nor concerns regarding medication, administration site, side effects, nor aftercare.  Patient identified with two identifiers, order verified, and verbal consent for today's injection obtained from patient.     Patient education provided on s/s of injection site infection, and/or medication specific side effects, and when to call a provider.  Patient instructed to report any adverse effects.     1310 Velcade administered per protocol in left upper outer arm at 45 degree angle.  Site covered with sterile bandage.  Patient tolerated injection well, no verbal nor non-verbal signs of discomfort noted.  No adverse effects noted at this time.     Patient instructed to call with further questions or concerns.  Patient states understanding and is in agreement with this plan. Patient discharged.

## 2021-09-17 NOTE — PATIENT INSTRUCTIONS

## 2021-09-21 ENCOUNTER — INFUSION THERAPY VISIT (OUTPATIENT)
Dept: INFUSION THERAPY | Facility: OTHER | Age: 71
End: 2021-09-21
Attending: INTERNAL MEDICINE
Payer: MEDICARE

## 2021-09-21 ENCOUNTER — E-VISIT (OUTPATIENT)
Dept: FAMILY MEDICINE | Facility: OTHER | Age: 71
End: 2021-09-21
Attending: PHYSICIAN ASSISTANT
Payer: MEDICARE

## 2021-09-21 VITALS
DIASTOLIC BLOOD PRESSURE: 68 MMHG | HEART RATE: 95 BPM | OXYGEN SATURATION: 95 % | BODY MASS INDEX: 29.57 KG/M2 | HEIGHT: 63 IN | TEMPERATURE: 97.8 F | WEIGHT: 166.89 LBS | SYSTOLIC BLOOD PRESSURE: 110 MMHG | RESPIRATION RATE: 20 BRPM

## 2021-09-21 DIAGNOSIS — R06.02 SOB (SHORTNESS OF BREATH): Primary | ICD-10-CM

## 2021-09-21 DIAGNOSIS — C90.00 MULTIPLE MYELOMA NOT HAVING ACHIEVED REMISSION (H): Primary | ICD-10-CM

## 2021-09-21 LAB
BASOPHILS # BLD AUTO: 0 10E3/UL (ref 0–0.2)
BASOPHILS NFR BLD AUTO: 1 %
EOSINOPHIL # BLD AUTO: 0.1 10E3/UL (ref 0–0.7)
EOSINOPHIL NFR BLD AUTO: 2 %
ERYTHROCYTE [DISTWIDTH] IN BLOOD BY AUTOMATED COUNT: 14.3 % (ref 10–15)
HCT VFR BLD AUTO: 41.2 % (ref 35–47)
HGB BLD-MCNC: 13.9 G/DL (ref 11.7–15.7)
IMM GRANULOCYTES # BLD: 0.2 10E3/UL
IMM GRANULOCYTES NFR BLD: 3 %
LYMPHOCYTES # BLD AUTO: 0.8 10E3/UL (ref 0.8–5.3)
LYMPHOCYTES NFR BLD AUTO: 14 %
MCH RBC QN AUTO: 31 PG (ref 26.5–33)
MCHC RBC AUTO-ENTMCNC: 33.7 G/DL (ref 31.5–36.5)
MCV RBC AUTO: 92 FL (ref 78–100)
MONOCYTES # BLD AUTO: 0.9 10E3/UL (ref 0–1.3)
MONOCYTES NFR BLD AUTO: 16 %
NEUTROPHILS # BLD AUTO: 3.5 10E3/UL (ref 1.6–8.3)
NEUTROPHILS NFR BLD AUTO: 64 %
NRBC # BLD AUTO: 0 10E3/UL
NRBC BLD AUTO-RTO: 0 /100
PLATELET # BLD AUTO: 216 10E3/UL (ref 150–450)
RBC # BLD AUTO: 4.49 10E6/UL (ref 3.8–5.2)
WBC # BLD AUTO: 5.5 10E3/UL (ref 4–11)

## 2021-09-21 PROCEDURE — 36415 COLL VENOUS BLD VENIPUNCTURE: CPT | Mod: ZL | Performed by: NURSE PRACTITIONER

## 2021-09-21 PROCEDURE — 85025 COMPLETE CBC W/AUTO DIFF WBC: CPT | Mod: ZL | Performed by: NURSE PRACTITIONER

## 2021-09-21 PROCEDURE — 96401 CHEMO ANTI-NEOPL SQ/IM: CPT

## 2021-09-21 PROCEDURE — 250N000011 HC RX IP 250 OP 636: Mod: JW | Performed by: NURSE PRACTITIONER

## 2021-09-21 RX ADMIN — BORTEZOMIB 2.4 MG: 3.5 INJECTION, POWDER, LYOPHILIZED, FOR SOLUTION INTRAVENOUS; SUBCUTANEOUS at 13:33

## 2021-09-21 ASSESSMENT — MIFFLIN-ST. JEOR: SCORE: 1236

## 2021-09-21 NOTE — PROGRESS NOTES
"Patient is 70 years old, here today for injection of Velcade per order of . Patient meets parameters for today's infusion. Patient identified with two identifiers, order verified, and verbal consent for today's infusion obtained from patient. Written consent for treatment is on file and valid.      Component      Latest Ref Rng & Units 9/21/2021   WBC      4.0 - 11.0 10e3/uL 5.5   RBC Count      3.80 - 5.20 10e6/uL 4.49   Hemoglobin      11.7 - 15.7 g/dL 13.9   Hematocrit      35.0 - 47.0 % 41.2   MCV      78 - 100 fL 92   MCH      26.5 - 33.0 pg 31.0   MCHC      31.5 - 36.5 g/dL 33.7   RDW      10.0 - 15.0 % 14.3   Platelet Count      150 - 450 10e3/uL 216   % Neutrophils      % 64   % Lymphocytes      % 14   % Monocytes      % 16   % Eosinophils      % 2   % Basophils      % 1   % Immature Granulocytes      % 3   NRBCs per 100 WBC      <1 /100 0   Absolute Neutrophils      1.6 - 8.3 10e3/uL 3.5   Absolute Lymphocytes      0.8 - 5.3 10e3/uL 0.8   Absolute Monocytes      0.0 - 1.3 10e3/uL 0.9   Absolute Eosinophils      0.0 - 0.7 10e3/uL 0.1   Absolute Basophils      0.0 - 0.2 10e3/uL 0.0   Absolute Immature Granulocytes      <=0.0 10e3/uL 0.2 (H)   Absolute NRBCs      10e3/uL 0.0       Patient meets order parameters for today's treatment. She does report she isn't feeling much better since she was \"seen\" via e-visit by PCP and diagnosed with Bronchitis last week. Started on Tessalon but stopped this am because she doesn't feel like they were helping. Chest felt tighter when taking than when not. Notes she doesn't hear the wheeze in her own chest when she breaths anymore. This nurse listened to lungs and patient no adventitious sounds heard. Afebrile. Denies fevers at home. VSS for patient. She is a bit tachycardic, bit higher than her normal, but still in normal range. Voices shortness of breath was worse this am. Labs WDL, sats WDL. Denies any other symptoms other than those charted here and those in " flowsheet. Spoke with Dr Walker re all noted above. VORB to continue with treatment today but ensure patient reaches out to PCP for follow up if she is not improving by tomorrow, next day at the latest, or sooner if worsens. Reviewed all with patient, she is happy to proceed with treatment today and verbalizes understanding of importance of calling or being seen as noted above.     Patient denies questions or concerns regarding injection and/or medication(s) being administered.    Independent dose check completed with UZMA Rodríguez.    Velcade injected SQ into right upper outer arm at a 45 degree angle per protocol rotating sites. Patient tolerated injection without incident, no signs or symptoms of adverse reaction noted. Patient denies pain or discomfort.     Covered with a sterile bandage. Pt instructed to leave bandage intact for a minimum of one hour, and to call with questions or concerns. Patient states understanding, discharged.

## 2021-09-24 ENCOUNTER — INFUSION THERAPY VISIT (OUTPATIENT)
Dept: INFUSION THERAPY | Facility: OTHER | Age: 71
End: 2021-09-24
Attending: INTERNAL MEDICINE
Payer: MEDICARE

## 2021-09-24 VITALS
RESPIRATION RATE: 16 BRPM | TEMPERATURE: 99.6 F | DIASTOLIC BLOOD PRESSURE: 72 MMHG | OXYGEN SATURATION: 96 % | WEIGHT: 163.14 LBS | SYSTOLIC BLOOD PRESSURE: 105 MMHG | BODY MASS INDEX: 28.91 KG/M2 | HEART RATE: 98 BPM

## 2021-09-24 DIAGNOSIS — C90.00 MULTIPLE MYELOMA NOT HAVING ACHIEVED REMISSION (H): Primary | ICD-10-CM

## 2021-09-24 LAB
BASOPHILS # BLD AUTO: 0.1 10E3/UL (ref 0–0.2)
BASOPHILS NFR BLD AUTO: 1 %
EOSINOPHIL # BLD AUTO: 0.1 10E3/UL (ref 0–0.7)
EOSINOPHIL NFR BLD AUTO: 1 %
ERYTHROCYTE [DISTWIDTH] IN BLOOD BY AUTOMATED COUNT: 14 % (ref 10–15)
HCT VFR BLD AUTO: 37.1 % (ref 35–47)
HGB BLD-MCNC: 12.9 G/DL (ref 11.7–15.7)
IMM GRANULOCYTES # BLD: 0.3 10E3/UL
IMM GRANULOCYTES NFR BLD: 3 %
LYMPHOCYTES # BLD AUTO: 1 10E3/UL (ref 0.8–5.3)
LYMPHOCYTES NFR BLD AUTO: 10 %
MCH RBC QN AUTO: 30.6 PG (ref 26.5–33)
MCHC RBC AUTO-ENTMCNC: 34.8 G/DL (ref 31.5–36.5)
MCV RBC AUTO: 88 FL (ref 78–100)
MONOCYTES # BLD AUTO: 1.2 10E3/UL (ref 0–1.3)
MONOCYTES NFR BLD AUTO: 11 %
NEUTROPHILS # BLD AUTO: 8.1 10E3/UL (ref 1.6–8.3)
NEUTROPHILS NFR BLD AUTO: 74 %
NRBC # BLD AUTO: 0 10E3/UL
NRBC BLD AUTO-RTO: 0 /100
PLATELET # BLD AUTO: 173 10E3/UL (ref 150–450)
RBC # BLD AUTO: 4.21 10E6/UL (ref 3.8–5.2)
WBC # BLD AUTO: 10.7 10E3/UL (ref 4–11)

## 2021-09-24 PROCEDURE — 85004 AUTOMATED DIFF WBC COUNT: CPT | Mod: ZL

## 2021-09-24 PROCEDURE — 96401 CHEMO ANTI-NEOPL SQ/IM: CPT

## 2021-09-24 PROCEDURE — 36415 COLL VENOUS BLD VENIPUNCTURE: CPT | Mod: ZL

## 2021-09-24 PROCEDURE — 250N000011 HC RX IP 250 OP 636: Performed by: NURSE PRACTITIONER

## 2021-09-24 RX ADMIN — BORTEZOMIB 2.4 MG: 3.5 INJECTION, POWDER, LYOPHILIZED, FOR SOLUTION INTRAVENOUS; SUBCUTANEOUS at 13:34

## 2021-09-24 NOTE — PROGRESS NOTES
Patient is *** years old, here today accompanied by *** for injection of *** per order of *** under the supervision of ***.     *** lab values are ***.     Labs meet parameters for today's injection.     Patient denies questions nor concerns regarding medication, administration site, side effects, nor aftercare.  Patient identified with two identifiers, order verified, and verbal consent for today's injection obtained from patient.   Patient education provided on s/s of injection site infection, and/or medication specific side effects, and when to call a provider.  Patient instructed to report any adverse effects.     Medication administered per protocol in *** at *** degree angle.  Site covered with sterile bandage.  Patient tolerated injection well, no verbal nor non-verbal signs of discomfort noted.  No adverse effects noted at this time.     Patient instructed to call with further questions or concerns.  Patient states understanding and is in agreement with this plan.  Copy of appointments, and after visit summary (AVS) given to patient. Patient discharged.

## 2021-09-24 NOTE — PROGRESS NOTES
Patient is a 70 year old, here today for injection of Velcade per order of Dr Walker.     Component      Latest Ref Rng & Units 9/24/2021   WBC      4.0 - 11.0 10e3/uL 10.7   RBC Count      3.80 - 5.20 10e6/uL 4.21   Hemoglobin      11.7 - 15.7 g/dL 12.9   Hematocrit      35.0 - 47.0 % 37.1   MCV      78 - 100 fL 88   MCH      26.5 - 33.0 pg 30.6   MCHC      31.5 - 36.5 g/dL 34.8   RDW      10.0 - 15.0 % 14.0   Platelet Count      150 - 450 10e3/uL 173   % Neutrophils      % 74   % Lymphocytes      % 10   % Monocytes      % 11   % Eosinophils      % 1   % Basophils      % 1   % Immature Granulocytes      % 3   NRBCs per 100 WBC      <1 /100 0   Absolute Neutrophils      1.6 - 8.3 10e3/uL 8.1   Absolute Lymphocytes      0.8 - 5.3 10e3/uL 1.0   Absolute Monocytes      0.0 - 1.3 10e3/uL 1.2   Absolute Eosinophils      0.0 - 0.7 10e3/uL 0.1   Absolute Basophils      0.0 - 0.2 10e3/uL 0.1   Absolute Immature Granulocytes      <=0.0 10e3/uL 0.3 (H)   Absolute NRBCs      10e3/uL 0.0       Labs meet parameters for today's injection.     Independent dose check completed with MARKO Lan RN, prior to release of drug.    Patient denies questions nor concerns regarding medication, administration site, side effects, nor aftercare.  Patient identified with two identifiers, order verified, and verbal consent for today's injection obtained from patient.     Patient education provided on s/s of injection site infection, and/or medication specific side effects, and when to call a provider.  Patient instructed to report any adverse effects.     1334 Velcade administered per protocol in right upper outer arm at 45 degree angle.  Site covered with sterile bandage.  Patient tolerated injection well, no verbal nor non-verbal signs of discomfort noted.  No adverse effects noted at this time.     Patient instructed to call with further questions or concerns.  Patient states understanding and is in agreement with this plan.  Copy of  appointments, and after visit summary (AVS) given to patient. Patient discharged.

## 2021-09-24 NOTE — PATIENT INSTRUCTIONS

## 2021-09-28 ENCOUNTER — LAB (OUTPATIENT)
Dept: LAB | Facility: OTHER | Age: 71
End: 2021-09-28
Payer: MEDICARE

## 2021-09-28 DIAGNOSIS — C90.00 MULTIPLE MYELOMA NOT HAVING ACHIEVED REMISSION (H): ICD-10-CM

## 2021-09-28 PROCEDURE — 84155 ASSAY OF PROTEIN SERUM: CPT | Mod: ZL

## 2021-09-28 PROCEDURE — 82784 ASSAY IGA/IGD/IGG/IGM EACH: CPT | Mod: ZL

## 2021-09-28 PROCEDURE — 84165 PROTEIN E-PHORESIS SERUM: CPT | Mod: ZL | Performed by: STUDENT IN AN ORGANIZED HEALTH CARE EDUCATION/TRAINING PROGRAM

## 2021-09-28 PROCEDURE — 36415 COLL VENOUS BLD VENIPUNCTURE: CPT | Mod: ZL

## 2021-09-28 PROCEDURE — 86334 IMMUNOFIX E-PHORESIS SERUM: CPT | Mod: ZL | Performed by: STUDENT IN AN ORGANIZED HEALTH CARE EDUCATION/TRAINING PROGRAM

## 2021-09-28 PROCEDURE — 83883 ASSAY NEPHELOMETRY NOT SPEC: CPT | Mod: ZL

## 2021-09-29 LAB — TOTAL PROTEIN SERUM FOR ELP: 6.9 G/DL (ref 6.8–8.8)

## 2021-09-30 LAB
ALBUMIN SERPL ELPH-MCNC: 3.7 G/DL (ref 3.7–5.1)
ALPHA1 GLOB SERPL ELPH-MCNC: 0.5 G/DL (ref 0.2–0.4)
ALPHA2 GLOB SERPL ELPH-MCNC: 1.2 G/DL (ref 0.5–0.9)
B-GLOBULIN SERPL ELPH-MCNC: 0.7 G/DL (ref 0.6–1)
GAMMA GLOB SERPL ELPH-MCNC: 0.8 G/DL (ref 0.7–1.6)
IGA SERPL-MCNC: 14 MG/DL (ref 84–499)
IGG SERPL-MCNC: 876 MG/DL (ref 610–1616)
IGM SERPL-MCNC: 21 MG/DL (ref 35–242)
KAPPA LC FREE SER-MCNC: 0.56 MG/DL (ref 0.33–1.94)
KAPPA LC FREE/LAMBDA FREE SER NEPH: 2.33 {RATIO} (ref 0.26–1.65)
LAMBDA LC FREE SERPL-MCNC: 0.24 MG/DL (ref 0.57–2.63)
M PROTEIN SERPL ELPH-MCNC: 0.4 G/DL
PROT PATTERN SERPL ELPH-IMP: ABNORMAL
PROT PATTERN SERPL IFE-IMP: NORMAL

## 2021-09-30 PROCEDURE — 86334 IMMUNOFIX E-PHORESIS SERUM: CPT | Mod: 26 | Performed by: STUDENT IN AN ORGANIZED HEALTH CARE EDUCATION/TRAINING PROGRAM

## 2021-09-30 PROCEDURE — 84165 PROTEIN E-PHORESIS SERUM: CPT | Mod: 26 | Performed by: STUDENT IN AN ORGANIZED HEALTH CARE EDUCATION/TRAINING PROGRAM

## 2021-10-03 ENCOUNTER — HEALTH MAINTENANCE LETTER (OUTPATIENT)
Age: 71
End: 2021-10-03

## 2021-10-05 ENCOUNTER — INFUSION THERAPY VISIT (OUTPATIENT)
Dept: INFUSION THERAPY | Facility: OTHER | Age: 71
End: 2021-10-05
Attending: INTERNAL MEDICINE
Payer: MEDICARE

## 2021-10-05 ENCOUNTER — ONCOLOGY VISIT (OUTPATIENT)
Dept: ONCOLOGY | Facility: OTHER | Age: 71
End: 2021-10-05
Attending: NURSE PRACTITIONER
Payer: MEDICARE

## 2021-10-05 ENCOUNTER — APPOINTMENT (OUTPATIENT)
Dept: LAB | Facility: OTHER | Age: 71
End: 2021-10-05
Attending: NURSE PRACTITIONER
Payer: MEDICARE

## 2021-10-05 VITALS
OXYGEN SATURATION: 97 % | WEIGHT: 162.48 LBS | RESPIRATION RATE: 20 BRPM | TEMPERATURE: 99.1 F | SYSTOLIC BLOOD PRESSURE: 110 MMHG | HEART RATE: 84 BPM | DIASTOLIC BLOOD PRESSURE: 60 MMHG | HEIGHT: 63 IN | BODY MASS INDEX: 28.79 KG/M2

## 2021-10-05 DIAGNOSIS — Z23 NEED FOR PROPHYLACTIC VACCINATION AND INOCULATION AGAINST INFLUENZA: ICD-10-CM

## 2021-10-05 DIAGNOSIS — C90.00 MULTIPLE MYELOMA NOT HAVING ACHIEVED REMISSION (H): Primary | ICD-10-CM

## 2021-10-05 DIAGNOSIS — M89.9 BONE LESION: ICD-10-CM

## 2021-10-05 LAB
ALBUMIN SERPL-MCNC: 3 G/DL (ref 3.4–5)
ALP SERPL-CCNC: 113 U/L (ref 40–150)
ALT SERPL W P-5'-P-CCNC: 14 U/L (ref 0–50)
ANION GAP SERPL CALCULATED.3IONS-SCNC: 3 MMOL/L (ref 3–14)
AST SERPL W P-5'-P-CCNC: 12 U/L (ref 0–45)
BASOPHILS # BLD AUTO: 0.1 10E3/UL (ref 0–0.2)
BASOPHILS NFR BLD AUTO: 1 %
BILIRUB SERPL-MCNC: 0.4 MG/DL (ref 0.2–1.3)
BUN SERPL-MCNC: 16 MG/DL (ref 7–30)
CALCIUM SERPL-MCNC: 8.7 MG/DL (ref 8.5–10.1)
CHLORIDE BLD-SCNC: 105 MMOL/L (ref 94–109)
CO2 SERPL-SCNC: 28 MMOL/L (ref 20–32)
CREAT SERPL-MCNC: 0.68 MG/DL (ref 0.52–1.04)
EOSINOPHIL # BLD AUTO: 0.3 10E3/UL (ref 0–0.7)
EOSINOPHIL NFR BLD AUTO: 5 %
ERYTHROCYTE [DISTWIDTH] IN BLOOD BY AUTOMATED COUNT: 14.5 % (ref 10–15)
GFR SERPL CREATININE-BSD FRML MDRD: 87 ML/MIN/1.73M2
GLUCOSE BLD-MCNC: 114 MG/DL (ref 70–99)
HCT VFR BLD AUTO: 39.3 % (ref 35–47)
HGB BLD-MCNC: 12.9 G/DL (ref 11.7–15.7)
IMM GRANULOCYTES # BLD: 0.1 10E3/UL
IMM GRANULOCYTES NFR BLD: 1 %
LYMPHOCYTES # BLD AUTO: 1 10E3/UL (ref 0.8–5.3)
LYMPHOCYTES NFR BLD AUTO: 15 %
MCH RBC QN AUTO: 30.5 PG (ref 26.5–33)
MCHC RBC AUTO-ENTMCNC: 32.8 G/DL (ref 31.5–36.5)
MCV RBC AUTO: 93 FL (ref 78–100)
MONOCYTES # BLD AUTO: 0.6 10E3/UL (ref 0–1.3)
MONOCYTES NFR BLD AUTO: 9 %
NEUTROPHILS # BLD AUTO: 4.4 10E3/UL (ref 1.6–8.3)
NEUTROPHILS NFR BLD AUTO: 69 %
NRBC # BLD AUTO: 0 10E3/UL
NRBC BLD AUTO-RTO: 0 /100
PLATELET # BLD AUTO: 318 10E3/UL (ref 150–450)
POTASSIUM BLD-SCNC: 4.1 MMOL/L (ref 3.4–5.3)
PROT SERPL-MCNC: 6.9 G/DL (ref 6.8–8.8)
RBC # BLD AUTO: 4.23 10E6/UL (ref 3.8–5.2)
SODIUM SERPL-SCNC: 136 MMOL/L (ref 133–144)
WBC # BLD AUTO: 6.4 10E3/UL (ref 4–11)

## 2021-10-05 PROCEDURE — 90662 IIV NO PRSV INCREASED AG IM: CPT

## 2021-10-05 PROCEDURE — 36415 COLL VENOUS BLD VENIPUNCTURE: CPT | Mod: ZL | Performed by: NURSE PRACTITIONER

## 2021-10-05 PROCEDURE — 96401 CHEMO ANTI-NEOPL SQ/IM: CPT

## 2021-10-05 PROCEDURE — 250N000011 HC RX IP 250 OP 636: Performed by: NURSE PRACTITIONER

## 2021-10-05 PROCEDURE — 250N000013 HC RX MED GY IP 250 OP 250 PS 637: Mod: GY | Performed by: NURSE PRACTITIONER

## 2021-10-05 PROCEDURE — G0008 ADMIN INFLUENZA VIRUS VAC: HCPCS

## 2021-10-05 PROCEDURE — 85025 COMPLETE CBC W/AUTO DIFF WBC: CPT | Mod: ZL | Performed by: NURSE PRACTITIONER

## 2021-10-05 PROCEDURE — 80053 COMPREHEN METABOLIC PANEL: CPT | Mod: ZL | Performed by: NURSE PRACTITIONER

## 2021-10-05 PROCEDURE — G0463 HOSPITAL OUTPT CLINIC VISIT: HCPCS

## 2021-10-05 PROCEDURE — 99213 OFFICE O/P EST LOW 20 MIN: CPT | Performed by: NURSE PRACTITIONER

## 2021-10-05 PROCEDURE — G0463 HOSPITAL OUTPT CLINIC VISIT: HCPCS | Mod: 25

## 2021-10-05 RX ORDER — ALBUTEROL SULFATE 0.83 MG/ML
2.5 SOLUTION RESPIRATORY (INHALATION)
Status: CANCELLED | OUTPATIENT
Start: 2021-10-15

## 2021-10-05 RX ORDER — MEPERIDINE HYDROCHLORIDE 25 MG/ML
25 INJECTION INTRAMUSCULAR; INTRAVENOUS; SUBCUTANEOUS EVERY 30 MIN PRN
Status: CANCELLED | OUTPATIENT
Start: 2021-10-12

## 2021-10-05 RX ORDER — MEPERIDINE HYDROCHLORIDE 25 MG/ML
25 INJECTION INTRAMUSCULAR; INTRAVENOUS; SUBCUTANEOUS EVERY 30 MIN PRN
Status: CANCELLED | OUTPATIENT
Start: 2021-10-05

## 2021-10-05 RX ORDER — ACETAMINOPHEN 325 MG/1
650 TABLET ORAL ONCE
Status: COMPLETED | OUTPATIENT
Start: 2021-10-05 | End: 2021-10-05

## 2021-10-05 RX ORDER — SODIUM CHLORIDE 9 MG/ML
1000 INJECTION, SOLUTION INTRAVENOUS CONTINUOUS PRN
Status: CANCELLED
Start: 2021-10-08

## 2021-10-05 RX ORDER — ALBUTEROL SULFATE 90 UG/1
1-2 AEROSOL, METERED RESPIRATORY (INHALATION)
Status: CANCELLED
Start: 2021-10-08

## 2021-10-05 RX ORDER — DIPHENHYDRAMINE HYDROCHLORIDE 50 MG/ML
50 INJECTION INTRAMUSCULAR; INTRAVENOUS
Status: CANCELLED
Start: 2021-10-15

## 2021-10-05 RX ORDER — MEPERIDINE HYDROCHLORIDE 25 MG/ML
25 INJECTION INTRAMUSCULAR; INTRAVENOUS; SUBCUTANEOUS EVERY 30 MIN PRN
Status: CANCELLED | OUTPATIENT
Start: 2021-10-15

## 2021-10-05 RX ORDER — SODIUM CHLORIDE 9 MG/ML
1000 INJECTION, SOLUTION INTRAVENOUS CONTINUOUS PRN
Status: CANCELLED
Start: 2021-10-12

## 2021-10-05 RX ORDER — HEPARIN SODIUM,PORCINE 10 UNIT/ML
5 VIAL (ML) INTRAVENOUS
Status: CANCELLED | OUTPATIENT
Start: 2021-10-12

## 2021-10-05 RX ORDER — HEPARIN SODIUM (PORCINE) LOCK FLUSH IV SOLN 100 UNIT/ML 100 UNIT/ML
5 SOLUTION INTRAVENOUS
Status: CANCELLED | OUTPATIENT
Start: 2021-10-12

## 2021-10-05 RX ORDER — HEPARIN SODIUM (PORCINE) LOCK FLUSH IV SOLN 100 UNIT/ML 100 UNIT/ML
5 SOLUTION INTRAVENOUS
Status: CANCELLED | OUTPATIENT
Start: 2021-10-05

## 2021-10-05 RX ORDER — ALBUTEROL SULFATE 90 UG/1
1-2 AEROSOL, METERED RESPIRATORY (INHALATION)
Status: CANCELLED
Start: 2021-10-15

## 2021-10-05 RX ORDER — METHYLPREDNISOLONE SODIUM SUCCINATE 125 MG/2ML
125 INJECTION, POWDER, LYOPHILIZED, FOR SOLUTION INTRAMUSCULAR; INTRAVENOUS
Status: CANCELLED
Start: 2021-10-15

## 2021-10-05 RX ORDER — NALOXONE HYDROCHLORIDE 0.4 MG/ML
.1-.4 INJECTION, SOLUTION INTRAMUSCULAR; INTRAVENOUS; SUBCUTANEOUS
Status: CANCELLED | OUTPATIENT
Start: 2021-10-08

## 2021-10-05 RX ORDER — METHYLPREDNISOLONE SODIUM SUCCINATE 125 MG/2ML
125 INJECTION, POWDER, LYOPHILIZED, FOR SOLUTION INTRAMUSCULAR; INTRAVENOUS
Status: CANCELLED
Start: 2021-10-12

## 2021-10-05 RX ORDER — LORAZEPAM 2 MG/ML
0.5 INJECTION INTRAMUSCULAR EVERY 4 HOURS PRN
Status: CANCELLED | OUTPATIENT
Start: 2021-10-08

## 2021-10-05 RX ORDER — DIPHENHYDRAMINE HYDROCHLORIDE 50 MG/ML
50 INJECTION INTRAMUSCULAR; INTRAVENOUS
Status: CANCELLED
Start: 2021-10-08

## 2021-10-05 RX ORDER — HEPARIN SODIUM (PORCINE) LOCK FLUSH IV SOLN 100 UNIT/ML 100 UNIT/ML
5 SOLUTION INTRAVENOUS
Status: CANCELLED | OUTPATIENT
Start: 2021-10-15

## 2021-10-05 RX ORDER — EPINEPHRINE 1 MG/ML
0.3 INJECTION, SOLUTION, CONCENTRATE INTRAVENOUS EVERY 5 MIN PRN
Status: CANCELLED | OUTPATIENT
Start: 2021-10-08

## 2021-10-05 RX ORDER — DIPHENHYDRAMINE HYDROCHLORIDE 50 MG/ML
50 INJECTION INTRAMUSCULAR; INTRAVENOUS
Status: CANCELLED
Start: 2021-10-12

## 2021-10-05 RX ORDER — NALOXONE HYDROCHLORIDE 0.4 MG/ML
.1-.4 INJECTION, SOLUTION INTRAMUSCULAR; INTRAVENOUS; SUBCUTANEOUS
Status: CANCELLED | OUTPATIENT
Start: 2021-10-12

## 2021-10-05 RX ORDER — DIPHENHYDRAMINE HCL 50 MG
50 CAPSULE ORAL ONCE
Status: COMPLETED | OUTPATIENT
Start: 2021-10-05 | End: 2021-10-05

## 2021-10-05 RX ORDER — LORAZEPAM 2 MG/ML
0.5 INJECTION INTRAMUSCULAR EVERY 4 HOURS PRN
Status: CANCELLED | OUTPATIENT
Start: 2021-10-15

## 2021-10-05 RX ORDER — ALBUTEROL SULFATE 90 UG/1
1-2 AEROSOL, METERED RESPIRATORY (INHALATION)
Status: CANCELLED
Start: 2021-10-12

## 2021-10-05 RX ORDER — ALBUTEROL SULFATE 0.83 MG/ML
2.5 SOLUTION RESPIRATORY (INHALATION)
Status: CANCELLED | OUTPATIENT
Start: 2021-10-05

## 2021-10-05 RX ORDER — EPINEPHRINE 1 MG/ML
0.3 INJECTION, SOLUTION, CONCENTRATE INTRAVENOUS EVERY 5 MIN PRN
Status: CANCELLED | OUTPATIENT
Start: 2021-10-05

## 2021-10-05 RX ORDER — NALOXONE HYDROCHLORIDE 0.4 MG/ML
.1-.4 INJECTION, SOLUTION INTRAMUSCULAR; INTRAVENOUS; SUBCUTANEOUS
Status: CANCELLED | OUTPATIENT
Start: 2021-10-15

## 2021-10-05 RX ORDER — MEPERIDINE HYDROCHLORIDE 25 MG/ML
25 INJECTION INTRAMUSCULAR; INTRAVENOUS; SUBCUTANEOUS EVERY 30 MIN PRN
Status: CANCELLED | OUTPATIENT
Start: 2021-10-08

## 2021-10-05 RX ORDER — HEPARIN SODIUM,PORCINE 10 UNIT/ML
5 VIAL (ML) INTRAVENOUS
Status: CANCELLED | OUTPATIENT
Start: 2021-10-08

## 2021-10-05 RX ORDER — EPINEPHRINE 1 MG/ML
0.3 INJECTION, SOLUTION, CONCENTRATE INTRAVENOUS EVERY 5 MIN PRN
Status: CANCELLED | OUTPATIENT
Start: 2021-10-12

## 2021-10-05 RX ORDER — HEPARIN SODIUM,PORCINE 10 UNIT/ML
5 VIAL (ML) INTRAVENOUS
Status: CANCELLED | OUTPATIENT
Start: 2021-10-05

## 2021-10-05 RX ORDER — LORAZEPAM 2 MG/ML
0.5 INJECTION INTRAMUSCULAR EVERY 4 HOURS PRN
Status: CANCELLED | OUTPATIENT
Start: 2021-10-05

## 2021-10-05 RX ORDER — SODIUM CHLORIDE 9 MG/ML
1000 INJECTION, SOLUTION INTRAVENOUS CONTINUOUS PRN
Status: CANCELLED
Start: 2021-10-15

## 2021-10-05 RX ORDER — NALOXONE HYDROCHLORIDE 0.4 MG/ML
.1-.4 INJECTION, SOLUTION INTRAMUSCULAR; INTRAVENOUS; SUBCUTANEOUS
Status: CANCELLED | OUTPATIENT
Start: 2021-10-05

## 2021-10-05 RX ORDER — LORAZEPAM 2 MG/ML
0.5 INJECTION INTRAMUSCULAR EVERY 4 HOURS PRN
Status: CANCELLED | OUTPATIENT
Start: 2021-10-12

## 2021-10-05 RX ORDER — HEPARIN SODIUM (PORCINE) LOCK FLUSH IV SOLN 100 UNIT/ML 100 UNIT/ML
5 SOLUTION INTRAVENOUS
Status: CANCELLED | OUTPATIENT
Start: 2021-10-08

## 2021-10-05 RX ORDER — ALBUTEROL SULFATE 0.83 MG/ML
2.5 SOLUTION RESPIRATORY (INHALATION)
Status: CANCELLED | OUTPATIENT
Start: 2021-10-12

## 2021-10-05 RX ORDER — HEPARIN SODIUM,PORCINE 10 UNIT/ML
5 VIAL (ML) INTRAVENOUS
Status: CANCELLED | OUTPATIENT
Start: 2021-10-15

## 2021-10-05 RX ORDER — ALBUTEROL SULFATE 90 UG/1
1-2 AEROSOL, METERED RESPIRATORY (INHALATION)
Status: CANCELLED
Start: 2021-10-05

## 2021-10-05 RX ORDER — EPINEPHRINE 1 MG/ML
0.3 INJECTION, SOLUTION, CONCENTRATE INTRAVENOUS EVERY 5 MIN PRN
Status: CANCELLED | OUTPATIENT
Start: 2021-10-15

## 2021-10-05 RX ORDER — DIPHENHYDRAMINE HYDROCHLORIDE 50 MG/ML
50 INJECTION INTRAMUSCULAR; INTRAVENOUS
Status: CANCELLED
Start: 2021-10-05

## 2021-10-05 RX ORDER — METHYLPREDNISOLONE SODIUM SUCCINATE 125 MG/2ML
125 INJECTION, POWDER, LYOPHILIZED, FOR SOLUTION INTRAMUSCULAR; INTRAVENOUS
Status: CANCELLED
Start: 2021-10-08

## 2021-10-05 RX ORDER — METHYLPREDNISOLONE SODIUM SUCCINATE 125 MG/2ML
125 INJECTION, POWDER, LYOPHILIZED, FOR SOLUTION INTRAMUSCULAR; INTRAVENOUS
Status: CANCELLED
Start: 2021-10-05

## 2021-10-05 RX ORDER — SODIUM CHLORIDE 9 MG/ML
1000 INJECTION, SOLUTION INTRAVENOUS CONTINUOUS PRN
Status: CANCELLED
Start: 2021-10-05

## 2021-10-05 RX ORDER — ALBUTEROL SULFATE 0.83 MG/ML
2.5 SOLUTION RESPIRATORY (INHALATION)
Status: CANCELLED | OUTPATIENT
Start: 2021-10-08

## 2021-10-05 RX ADMIN — BORTEZOMIB 2.4 MG: 3.5 INJECTION, POWDER, LYOPHILIZED, FOR SOLUTION INTRAVENOUS; SUBCUTANEOUS at 15:22

## 2021-10-05 RX ADMIN — DARATUMUMAB AND HYALURONIDASE-FIHJ (HUMAN RECOMBINANT) 1800 MG: 1800; 30000 INJECTION SUBCUTANEOUS at 15:30

## 2021-10-05 RX ADMIN — ACETAMINOPHEN 650 MG: 325 TABLET, FILM COATED ORAL at 14:52

## 2021-10-05 RX ADMIN — DIPHENHYDRAMINE HYDROCHLORIDE 50 MG: 50 CAPSULE ORAL at 14:53

## 2021-10-05 ASSESSMENT — MIFFLIN-ST. JEOR: SCORE: 1211.13

## 2021-10-05 ASSESSMENT — PAIN SCALES - GENERAL: PAINLEVEL: NO PAIN (0)

## 2021-10-05 NOTE — PROGRESS NOTES
Oncology Follow-up Visit:  October 5, 2021    Reason for Visit:  Patient presents with:  Oncology Clinic Visit: Follow up Multiple myeloma not having achieved remission      Nursing Note and documentation reviewed: yes    HPI:  This is a 71-year-old female patient who presents to the oncology clinic today for evaluation prior to receiving cycle 14 day 1 therapy for Stage II-RISS IgG multiple myeloma diagnosed June 2019.  She progressed on Velcade and dexamethasone and CyBorD and treated was changed to Darzalex faspro injection. M-spike went from 1.0 to 1.2 in March so Velcade and dex were added to treatment plan.     She presents to the clinic today stating she is doing okay.  She has had about a 3-week history of upper respiratory infection.  She was diagnosed with bronchitis and given something for cough.  She states she actually stopped taking this as she felt it was preventing her from coughing and loosening up the chest congestion.  She denies any fevers throughout her illness but was very rundown with increased cough and runny nose.  She had no real nasal congestion.  She is feeling much better and coughing less and denies any shortness of breath.  Her fatigue is much improved and she has actually walked a mile the last 2 days.    Oncologic History:     12/31/2018   patient presented to the emergency room with vertigo and fatigue.  CT scan of the head was negative and subsequent stress test was negative.  5/3/2019  She was seen by her PCP who ordered lab work and noted a total protein of 12.9.  SPEP at that time showed an M spike of 6.2 with a large monoclonal protein seen in the gamma fraction.  Urine immunofixation showed a possible small protein band in the gamma fraction  5/31/2019  she was evaluated by Dr. Walker with Medical Oncology with plan to rule out myeloma and obtain bone marrow aspiration biopsy as well as a metastatic bone survey along with additional labwork  6/18/2019 she underwent bone  marrow aspiration and biopsy  6/24/2019  She was seen again by Dr. Walker and CBC showed a hemoglobin of 9.3, M spike 7.3 with monoclonal IgG immunoglobulin of kappa light chain type; serum viscosity was 2.9; quantitative immunoglobulins showed an IgG of 8160, beta-2 microglobulin was 5.8, BUN was 21 with creatinine is 0.8 and total protein was 13.7.  Quantitative kappa/lambda free light chains showed an elevated ratio of 17.0 bone marrow aspiration biopsy showed plasma cell myeloma with approximately 80% plasma cells.  Immunofixation showed IgG kappa and flow cytometry revealed kappa monotypic plasma cells consistent with clonal plasma cell neoplasm and FISH panel was pending at that time.  It was felt she had at least stage II disease based on her beta-2 microglobulin and anemia.  Plan was to treat with Velcade 1.3 mg per metered squared days 1, 4, 8 and 11/Decadron 40 mg on days 1, 8 and 15 initiation of Revlimid with the second cycle at 25 mg daily days 1 through 14.  Plan was to also obtain an MRI of the lumbar spine to rule out lytic lesion at L3.  She was initiated on Zovirax 400 mg p.o. twice daily.  6/25/2019  1st cycle of chemotherapy initiated  7/1/2019 note in chart regarding patient's large co-pay for the Revlimid and no plan at this point to initiate Revlimid and treat only with Velcade and Decadron per Dr. Walker  7/11/2019  MRI lumbar spine shows a pathologic superior endplate compression fracture at L3 without evidence of retropulsed fragment and innumerable enhancing lesions throughout the lumbar spine consistent with history of multiple myeloma.  9/10/2019  Increasing M-spike and kappa lambda ratio  10/1/2019  Initiation of CyBorD  11/17/2021  Stabilization of M-spike at 1.2  12/1/2020  Initiation of Darzalex Faspro injection  3/23/2021  M-spike increased form 1.0 to 1.2 and velcade and dexamethasone added to plan     Current Chemo Regime/TX:  Darzalex faspro injection 1800mg subcutaneous per  "protocol with velcade 1.3mg/m2 and dexamethasone 20mg added to day 4, 8, 11 with cycle given every 21 days      **Zometa 4mg every 3 months  Current Cycle: 14 day 1 (cycle 5 added velcade dex)  # of completed cycles:  13     Previous treatment:   Velcade 1.3 mg/m2 days 1, 4, 8 and 11 with Decadron 40 mg days 1, 8 and 15 x 4 cycles;   Velcade 1.5mg.m2/cyclophosphamide 150mg every 7 days on days 1,8,15 and 22/decadron 40mg days 1,8,15,22 ; Darzalex faspro injection 1800mg subcutaneous per protocol     Past Medical History:   Diagnosis Date     Arthritis      Depressive disorder      Diabetes mellitus, type 2 (H) 1/18/2021     Essential hypertension 10/1/2015     Major depressive disorder, recurrent episode, mild (H) 10/1/2015     Mixed hyperlipidemia 10/1/2015     Multiple myeloma not having achieved remission (H) 6/24/2019     Other specified disorders of bladder 07/09/2012    Irritable Bladder     Seasonal allergies 10/1/2015     Unspecified essential hypertension 03/19/2007     Unspecified sinusitis (chronic) 09/05/2007       Past Surgical History:   Procedure Laterality Date     APPENDECTOMY       BONE MARROW BIOPSY, BONE SPECIMEN, NEEDLE/TROCAR N/A 6/18/2019    Procedure: BONE MARROW BIOPSY;  Surgeon: Maciej Sanz MD;  Location: [facility]     CHOLECYSTECTOMY       COLONOSCOPY  07-    repeat 10 years     COLONOSCOPY N/A 12/30/2016    Procedure: COLONOSCOPY;  Surgeon: Bhaskar Franklin DO;  Location: [facility]     SINUS SURGERY       TUBAL STERILIZATION         Family History   Problem Relation Age of Onset     Breast Cancer Mother 66        Cause of Death     Parkinsonism Father         \"Possible\"     Coronary Artery Disease Father      Pacemaker Father      Thyroid Disease Daughter      Diabetes No family hx of      Hypertension No family hx of      Hyperlipidemia No family hx of      Cerebrovascular Disease No family hx of      Colon Cancer No family hx of      Prostate Cancer No family hx of      Genetic " Disorder No family hx of      Asthma No family hx of      Anesthesia Reaction No family hx of        Social History     Socioeconomic History     Marital status:      Spouse name: Not on file     Number of children: Not on file     Years of education: Not on file     Highest education level: Not on file   Occupational History     Occupation: Financial     Comment:  - (FT)   Tobacco Use     Smoking status: Never Smoker     Smokeless tobacco: Never Used     Tobacco comment: Tried to Quit (YES); QUIT in 1971; Passive Exposure (NO)   Substance and Sexual Activity     Alcohol use: Yes     Comment: RARELY     Drug use: No     Sexual activity: Yes     Partners: Male     Birth control/protection: None   Other Topics Concern      Service Not Asked     Blood Transfusions Not Asked     Caffeine Concern Yes     Comment: Coffee >6 cups daily     Occupational Exposure Not Asked     Hobby Hazards Not Asked     Sleep Concern Not Asked     Stress Concern Not Asked     Weight Concern Not Asked     Special Diet Not Asked     Back Care Not Asked     Exercise Not Asked     Bike Helmet Not Asked     Seat Belt Not Asked     Self-Exams Not Asked     Parent/sibling w/ CABG, MI or angioplasty before 65F 55M? No   Social History Narrative     Not on file     Social Determinants of Health     Financial Resource Strain:      Difficulty of Paying Living Expenses:    Food Insecurity:      Worried About Running Out of Food in the Last Year:      Ran Out of Food in the Last Year:    Transportation Needs:      Lack of Transportation (Medical):      Lack of Transportation (Non-Medical):    Physical Activity:      Days of Exercise per Week:      Minutes of Exercise per Session:    Stress:      Feeling of Stress :    Social Connections:      Frequency of Communication with Friends and Family:      Frequency of Social Gatherings with Friends and Family:      Attends Buddhism Services:      Active Member of Clubs or  Organizations:      Attends Club or Organization Meetings:      Marital Status:    Intimate Partner Violence:      Fear of Current or Ex-Partner:      Emotionally Abused:      Physically Abused:      Sexually Abused:        Current Outpatient Medications   Medication     acyclovir (ZOVIRAX) 400 MG tablet     aspirin (ASA) 81 MG chewable tablet     atorvastatin (LIPITOR) 10 MG tablet     dexamethasone (DECADRON) 4 MG tablet     fluticasone (FLONASE) 50 MCG/ACT nasal spray     L-Lysine HCl 500 MG CAPS     losartan (COZAAR) 50 MG tablet     sertraline (ZOLOFT) 50 MG tablet     benzonatate (TESSALON) 200 MG capsule     EPINEPHrine (EPIPEN) 0.3 MG/0.3ML injection     prochlorperazine (COMPAZINE) 10 MG tablet     No current facility-administered medications for this visit.        Allergies   Allergen Reactions     Lisinopril Cough     Phenylephrine Hcl Other (See Comments)     **Entex  HEADACHE (SEVERE)     Phenylpropanolamine Other (See Comments)     **Entex  HEADACHE (SEVERE)     Pseudoephedrine Tannate Other (See Comments)     **Entex  HEADACHE (SEVERE)     Levofloxacin Rash     **Levaquin     Moxifloxacin Hcl [Avelox] Rash       Review Of Systems:  Constitutional:    denies fever, weight changes, chills, and night sweats.  Eyes:    denies blurred or double vision  Ears/Nose/Throat:   denies ear pain, nose problems, difficulty swallowing  Respiratory:   See HPI  Skin:   denies rash, lesions  Cardiovascular:   denies chest pain, palpitations, edema  Gastrointestinal:   denies abdominal pain, bloating, nausea, early satiety; no change in bowel habits or blood in stool  Genitourinary:   denies difficulty with urination, blood in urine  Musculoskeletal:    denies new muscle pain, bone pain  Neurologic:   denies lightheadedness, headaches, numbness or tingling  Psychiatric:   denies anxiety, depression  Hematologic/Lymphatic/Immunologic:   denies easy bruising, easy bleeding, lumps or bumps noted  Endocrine:   Denies  "increased thirst      ECOG Performance Status: 1    Physical Exam:  /60   Pulse 84   Temp 99.1  F (37.3  C) (Tympanic)   Resp 20   Ht 1.6 m (5' 3\")   Wt 73.7 kg (162 lb 7.7 oz)   SpO2 97%   BMI 28.78 kg/m      GENERAL APPEARANCE: Healthy, alert and in no acute distress.  HEENT: Normocephalic, Sclerae anicteric.  No oral lesions or thrush  NECK:   No asymmetry or masses, no thyromegaly.  LYMPHATICS: No palpable cervical, supraclavicular, axillary, or inguinal nodes   RESP: Lungs clear to auscultation bilaterally, respirations regular and easy  CARDIOVASCULAR: Regular rate and rhythm. Normal S1, S2; no murmur, gallop, or rub.  ABDOMEN: Soft, nontender. Bowel sounds auscultated all 4 quadrants. No palpable organomegaly or masses.  MUSCULOSKELETAL: Extremities without gross deformities noted. No edema of bilateral lower extremities.  NEURO: Alert and oriented x 3.  Gait steady.  PSYCHIATRIC: Mentation and affect appear normal.  Mood appropriate.    Laboratory:    Results for orders placed or performed in visit on 10/05/21   Comprehensive metabolic panel     Status: Abnormal   Result Value Ref Range    Sodium 136 133 - 144 mmol/L    Potassium 4.1 3.4 - 5.3 mmol/L    Chloride 105 94 - 109 mmol/L    Carbon Dioxide (CO2) 28 20 - 32 mmol/L    Anion Gap 3 3 - 14 mmol/L    Urea Nitrogen 16 7 - 30 mg/dL    Creatinine 0.68 0.52 - 1.04 mg/dL    Calcium 8.7 8.5 - 10.1 mg/dL    Glucose 114 (H) 70 - 99 mg/dL    Alkaline Phosphatase 113 40 - 150 U/L    AST 12 0 - 45 U/L    ALT 14 0 - 50 U/L    Protein Total 6.9 6.8 - 8.8 g/dL    Albumin 3.0 (L) 3.4 - 5.0 g/dL    Bilirubin Total 0.4 0.2 - 1.3 mg/dL    GFR Estimate 87 >60 mL/min/1.73m2   CBC with platelets and differential     Status: Abnormal   Result Value Ref Range    WBC Count 6.4 4.0 - 11.0 10e3/uL    RBC Count 4.23 3.80 - 5.20 10e6/uL    Hemoglobin 12.9 11.7 - 15.7 g/dL    Hematocrit 39.3 35.0 - 47.0 %    MCV 93 78 - 100 fL    MCH 30.5 26.5 - 33.0 pg    MCHC 32.8 " 31.5 - 36.5 g/dL    RDW 14.5 10.0 - 15.0 %    Platelet Count 318 150 - 450 10e3/uL    % Neutrophils 69 %    % Lymphocytes 15 %    % Monocytes 9 %    % Eosinophils 5 %    % Basophils 1 %    % Immature Granulocytes 1 %    NRBCs per 100 WBC 0 <1 /100    Absolute Neutrophils 4.4 1.6 - 8.3 10e3/uL    Absolute Lymphocytes 1.0 0.8 - 5.3 10e3/uL    Absolute Monocytes 0.6 0.0 - 1.3 10e3/uL    Absolute Eosinophils 0.3 0.0 - 0.7 10e3/uL    Absolute Basophils 0.1 0.0 - 0.2 10e3/uL    Absolute Immature Granulocytes 0.1 (H) <=0.0 10e3/uL    Absolute NRBCs 0.0 10e3/uL   CBC with platelets differential     Status: Abnormal    Narrative    The following orders were created for panel order CBC with platelets differential.  Procedure                               Abnormality         Status                     ---------                               -----------         ------                     CBC with platelets and d...[[id number]]  Abnormal            Final result                 Please view results for these tests on the individual orders.       Component      Latest Ref Rng & Units 9/28/2021   Albumin Fraction      3.7 - 5.1 g/dL 3.7   Alpha 1 Fraction      0.2 - 0.4 g/dL 0.5 (H)   Alpha 2 Fraction      0.5 - 0.9 g/dL 1.2 (H)   Beta Fraction      0.6 - 1.0 g/dL 0.7   Gamma Fraction      0.7 - 1.6 g/dL 0.8   Monoclonal Peak      <=0.0 g/dL 0.4 (H)   ELP Interpretation:       Monoclonal protein (0.4 g/dL) seen in the gamma fraction. See immunofixation report on same specimen. Increased alpha 1 and alpha 2 globulins. Pathologic significance requires clinical correlation. Clair Lyons M.D., Ph.D.   [place] Free Light Chains      0.33 - 1.94 mg/dL 0.56   [place]AMB[place] FREE LT CHAINS      0.57 - 2.63 mg/dL 0.24 (L)   KAPPA/LAMBDA RATIO      0.26 - 1.65 2.33 (H)   IGG      610-1,616 mg/dL 876   IGA      84 - 499 mg/dL 14 (L)   IGM      35 - 242 mg/dL 21 (L)   Total Protein Serum for ELP      6.8 - 8.8 g/dL 6.9   Immunofixation ELP       Monoclonal  IgG immunoglobulin of kappa light chain type. Monoclonal antibody therapeutics (e.g. Daratumumab) may appear as monoclonal proteins on serum electrophoresis and immunofixation. Results should be interpreted with caution if the patient is receiving monoclonal antibody therapy. Pathological significance requires clinical correlation. Clair Lyons M.D., Ph.D.     Imaging Studies:  None completed for today's visit      ASSESSMENT/PLAN:    #1 Multiple myeloma:   IgG kappa multiple myeloma with 80% involvement of the bone marrow diagnosed June 2019 initially treated with Velcade and Decadron and received 4 cycles with increasing M-spike and kappa/lambda ratio.  Treatment changed to CyBorD on 10/1/2019.  M-spike plateau at 1.2 and treatment changed to monotherapy with Darzalex Faspro injection.     M spike declined to 1.0 then increased again to 1.2 so Velcade and Dex added to her treatment plan with cycle 5 therapy.  M-spike stable at 0.4.  Will initiate cycle 14 and patient will follow up prior to cycle 15 with a CBC, CMP, SPEP, quantitative immunoglobulins, protein immunofixation and kappa lambda ratio.      #2 lytic lesion: She will continue with the Zometa every 3 months. She remains on calcium with vitamin D twice a day and dental exams at least every 6 months.     #3 bronchitis: Essentially resolved.  Patient is afebrile.  She will receive her flu shot today.     I encouraged patient to call with any questions or concerns.      Barbie Quintana, NP  APRN, FNP-BC, AOCNP

## 2021-10-05 NOTE — PROGRESS NOTES
Patient presents to influenza program requesting influenza vaccination.  Standing orders implemented.    Vaccination given by This writer   Recorded by This writer

## 2021-10-05 NOTE — NURSING NOTE
"Oncology Rooming Note    October 5, 2021 1:51 PM   Carmelita Watts is a 71 year old female who presents for:    Chief Complaint   Patient presents with     Oncology Clinic Visit     Follow up Multiple myeloma not having achieved remission      Initial Vitals: /60   Pulse 84   Temp 99.1  F (37.3  C) (Tympanic)   Resp 20   Ht 1.6 m (5' 3\")   Wt 73.7 kg (162 lb 7.7 oz)   SpO2 97%   BMI 28.78 kg/m   Estimated body mass index is 28.78 kg/m  as calculated from the following:    Height as of this encounter: 1.6 m (5' 3\").    Weight as of this encounter: 73.7 kg (162 lb 7.7 oz). Body surface area is 1.81 meters squared.  No Pain (0) Comment: Data Unavailable   No LMP recorded. Patient is postmenopausal.  Allergies reviewed: Yes  Medications reviewed: Yes    Medications: Medication refills not needed today.  Pharmacy name entered into Psychiatric:    [id number] [place], [place] - [address] AT [facility] [address]  [facility] - [place], [place] - [address]            "

## 2021-10-05 NOTE — PROGRESS NOTES
Patient is a 71 year old female her today for injection of Velcade per order of . Patient identified with two identifiers, order verified, and verbal consent for today's infusion obtained from patient. Written consent for treatment is on file and valid.    Recent lab values: Reviewed by provider during oncology visit.  Patient meets order parameters for today's treatment.    Velcade dose verified with Barron RICH RN, prior to release of drug.      Patient denies questions or concerns regarding injection and/or medication(s) being administered.    Velcade injected SQ into left back arm at 45 degree angle per protocol rotating sites. Patient tolerated injection without incident, no signs or symptoms of adverse reaction noted. Patient denies pain or discomfort.     Covered with a sterile bandage. Pt instructed to leave bandage intact for a minimum of one hour, and to call with questions or concerns. Copy of appointments, discharge instructions, and after visit summary (AVS) provided to patient. Patient states understanding, discharged.

## 2021-10-08 ENCOUNTER — INFUSION THERAPY VISIT (OUTPATIENT)
Dept: INFUSION THERAPY | Facility: OTHER | Age: 71
End: 2021-10-08
Attending: INTERNAL MEDICINE
Payer: MEDICARE

## 2021-10-08 VITALS
OXYGEN SATURATION: 98 % | DIASTOLIC BLOOD PRESSURE: 80 MMHG | WEIGHT: 163.14 LBS | RESPIRATION RATE: 18 BRPM | HEART RATE: 82 BPM | SYSTOLIC BLOOD PRESSURE: 115 MMHG | TEMPERATURE: 97.9 F | BODY MASS INDEX: 28.91 KG/M2 | HEIGHT: 63 IN

## 2021-10-08 DIAGNOSIS — C90.00 MULTIPLE MYELOMA NOT HAVING ACHIEVED REMISSION (H): Primary | ICD-10-CM

## 2021-10-08 PROCEDURE — 250N000011 HC RX IP 250 OP 636: Performed by: NURSE PRACTITIONER

## 2021-10-08 PROCEDURE — 96401 CHEMO ANTI-NEOPL SQ/IM: CPT

## 2021-10-08 RX ADMIN — BORTEZOMIB 2.4 MG: 3.5 INJECTION, POWDER, LYOPHILIZED, FOR SOLUTION INTRAVENOUS; SUBCUTANEOUS at 13:01

## 2021-10-08 ASSESSMENT — MIFFLIN-ST. JEOR: SCORE: 1214

## 2021-10-08 NOTE — PATIENT INSTRUCTIONS

## 2021-10-08 NOTE — PROGRESS NOTES
Patient is 71 year old, here today for injection of Velcade per order of dr harris.     Patient denies questions nor concerns regarding medication, administration site, side effects, nor aftercare.  Patient identified with two identifiers, order verified, and verbal consent for today's injection obtained from patient.    Patient meets parameters for today's treatment.    Independent dose check completed with MARKO Quintanilla prior to release of Velcade.    Patient education provided on s/s of injection site infection, and/or medication specific side effects, and when to call a provider.  Patient instructed to report any adverse effects.     Velcade administered SQ per protocol in right upper outer arm at 45 degree angle.  Site covered with sterile bandage.  Patient tolerated injection well, no verbal nor non-verbal signs of discomfort noted.  No adverse effects noted at this time.     Patient instructed to call with further questions or concerns.  Patient states understanding and is in agreement with this plan.  Copy of appointments, and after visit summary (AVS) given to patient. Patient discharged.

## 2021-10-12 ENCOUNTER — INFUSION THERAPY VISIT (OUTPATIENT)
Dept: INFUSION THERAPY | Facility: OTHER | Age: 71
End: 2021-10-12
Attending: INTERNAL MEDICINE
Payer: MEDICARE

## 2021-10-12 VITALS
SYSTOLIC BLOOD PRESSURE: 98 MMHG | HEART RATE: 93 BPM | HEIGHT: 63 IN | WEIGHT: 161.38 LBS | OXYGEN SATURATION: 96 % | DIASTOLIC BLOOD PRESSURE: 69 MMHG | RESPIRATION RATE: 18 BRPM | BODY MASS INDEX: 28.59 KG/M2 | TEMPERATURE: 98.6 F

## 2021-10-12 DIAGNOSIS — C90.00 MULTIPLE MYELOMA NOT HAVING ACHIEVED REMISSION (H): Primary | ICD-10-CM

## 2021-10-12 LAB
BASOPHILS # BLD AUTO: 0 10E3/UL (ref 0–0.2)
BASOPHILS NFR BLD AUTO: 0 %
EOSINOPHIL # BLD AUTO: 0 10E3/UL (ref 0–0.7)
EOSINOPHIL NFR BLD AUTO: 1 %
ERYTHROCYTE [DISTWIDTH] IN BLOOD BY AUTOMATED COUNT: 14.6 % (ref 10–15)
HCT VFR BLD AUTO: 38.8 % (ref 35–47)
HGB BLD-MCNC: 13 G/DL (ref 11.7–15.7)
IMM GRANULOCYTES # BLD: 0.1 10E3/UL
IMM GRANULOCYTES NFR BLD: 3 %
LYMPHOCYTES # BLD AUTO: 0.4 10E3/UL (ref 0.8–5.3)
LYMPHOCYTES NFR BLD AUTO: 8 %
MCH RBC QN AUTO: 30.2 PG (ref 26.5–33)
MCHC RBC AUTO-ENTMCNC: 33.5 G/DL (ref 31.5–36.5)
MCV RBC AUTO: 90 FL (ref 78–100)
MONOCYTES # BLD AUTO: 0.2 10E3/UL (ref 0–1.3)
MONOCYTES NFR BLD AUTO: 4 %
NEUTROPHILS # BLD AUTO: 3.9 10E3/UL (ref 1.6–8.3)
NEUTROPHILS NFR BLD AUTO: 84 %
NRBC # BLD AUTO: 0 10E3/UL
NRBC BLD AUTO-RTO: 0 /100
PLATELET # BLD AUTO: 216 10E3/UL (ref 150–450)
RBC # BLD AUTO: 4.3 10E6/UL (ref 3.8–5.2)
WBC # BLD AUTO: 4.6 10E3/UL (ref 4–11)

## 2021-10-12 PROCEDURE — 96401 CHEMO ANTI-NEOPL SQ/IM: CPT

## 2021-10-12 PROCEDURE — 85004 AUTOMATED DIFF WBC COUNT: CPT | Mod: ZL | Performed by: NURSE PRACTITIONER

## 2021-10-12 PROCEDURE — 250N000011 HC RX IP 250 OP 636: Mod: JW | Performed by: NURSE PRACTITIONER

## 2021-10-12 PROCEDURE — 36415 COLL VENOUS BLD VENIPUNCTURE: CPT | Mod: ZL | Performed by: NURSE PRACTITIONER

## 2021-10-12 RX ADMIN — BORTEZOMIB 2.4 MG: 3.5 INJECTION, POWDER, LYOPHILIZED, FOR SOLUTION INTRAVENOUS; SUBCUTANEOUS at 13:36

## 2021-10-12 ASSESSMENT — MIFFLIN-ST. JEOR: SCORE: 1206

## 2021-10-12 NOTE — PATIENT INSTRUCTIONS

## 2021-10-12 NOTE — PROGRESS NOTES
Peripheral labs ordered. Butterfly needle inserted into RT ARM.  Immediate blood return noted. ONE   lab tubes drawn. Needle removed, covered with sterile guaze and coban. Patient tolerated well, denies pain or discomfort at this time. Patient discharged.

## 2021-10-12 NOTE — PROGRESS NOTES
Patient is 71 year old here today for injection of Velcade per order of Dr Walker.     Component      Latest Ref Rng & Units 10/12/2021   WBC      4.0 - 11.0 10e3/uL 4.6   RBC Count      3.80 - 5.20 10e6/uL 4.30   Hemoglobin      11.7 - 15.7 g/dL 13.0   Hematocrit      35.0 - 47.0 % 38.8   MCV      78 - 100 fL 90   MCH      26.5 - 33.0 pg 30.2   MCHC      31.5 - 36.5 g/dL 33.5   RDW      10.0 - 15.0 % 14.6   Platelet Count      150 - 450 10e3/uL 216   % Neutrophils      % 84   % Lymphocytes      % 8   % Monocytes      % 4   % Eosinophils      % 1   % Basophils      % 0   % Immature Granulocytes      % 3   NRBCs per 100 WBC      <1 /100 0   Absolute Neutrophils      1.6 - 8.3 10e3/uL 3.9   Absolute Lymphocytes      0.8 - 5.3 10e3/uL 0.4 (L)   Absolute Monocytes      0.0 - 1.3 10e3/uL 0.2   Absolute Eosinophils      0.0 - 0.7 10e3/uL 0.0   Absolute Basophils      0.0 - 0.2 10e3/uL 0.0   Absolute Immature Granulocytes      <=0.0 10e3/uL 0.1 (H)   Absolute NRBCs      10e3/uL 0.0       Labs meet parameters for today's injection.     Independent dose check with MARKO Lan RN prior to release of drug.    Patient denies questions nor concerns regarding medication, administration site, side effects, nor aftercare.  Patient identified with two identifiers, order verified, and verbal consent for today's injection obtained from patient.     Patient education provided on s/s of injection site infection, and/or medication specific side effects, and when to call a provider.  Patient instructed to report any adverse effects.     Velcade administered per protocol in left upper outer arm at 45 degree angle.  Site covered with sterile bandage.  Patient tolerated injection well, no verbal nor non-verbal signs of discomfort noted.  No adverse effects noted at this time.     Patient instructed to call with further questions or concerns.  Patient states understanding and is in agreement with this plan.  Copy of appointments, and after  visit summary (AVS) given to patient. Patient discharged.

## 2021-10-15 ENCOUNTER — INFUSION THERAPY VISIT (OUTPATIENT)
Dept: INFUSION THERAPY | Facility: OTHER | Age: 71
End: 2021-10-15
Attending: INTERNAL MEDICINE
Payer: MEDICARE

## 2021-10-15 VITALS
TEMPERATURE: 99.1 F | RESPIRATION RATE: 18 BRPM | WEIGHT: 161.6 LBS | DIASTOLIC BLOOD PRESSURE: 60 MMHG | SYSTOLIC BLOOD PRESSURE: 102 MMHG | OXYGEN SATURATION: 94 % | HEIGHT: 63 IN | HEART RATE: 86 BPM | BODY MASS INDEX: 28.63 KG/M2

## 2021-10-15 DIAGNOSIS — C90.00 MULTIPLE MYELOMA NOT HAVING ACHIEVED REMISSION (H): Primary | ICD-10-CM

## 2021-10-15 LAB
BASOPHILS # BLD AUTO: 0 10E3/UL (ref 0–0.2)
BASOPHILS NFR BLD AUTO: 1 %
EOSINOPHIL # BLD AUTO: 0.3 10E3/UL (ref 0–0.7)
EOSINOPHIL NFR BLD AUTO: 5 %
ERYTHROCYTE [DISTWIDTH] IN BLOOD BY AUTOMATED COUNT: 14.6 % (ref 10–15)
HCT VFR BLD AUTO: 39.9 % (ref 35–47)
HGB BLD-MCNC: 13.5 G/DL (ref 11.7–15.7)
IMM GRANULOCYTES # BLD: 0.2 10E3/UL
IMM GRANULOCYTES NFR BLD: 3 %
LYMPHOCYTES # BLD AUTO: 1.7 10E3/UL (ref 0.8–5.3)
LYMPHOCYTES NFR BLD AUTO: 28 %
MCH RBC QN AUTO: 30.7 PG (ref 26.5–33)
MCHC RBC AUTO-ENTMCNC: 33.8 G/DL (ref 31.5–36.5)
MCV RBC AUTO: 91 FL (ref 78–100)
MONOCYTES # BLD AUTO: 1.2 10E3/UL (ref 0–1.3)
MONOCYTES NFR BLD AUTO: 19 %
NEUTROPHILS # BLD AUTO: 2.6 10E3/UL (ref 1.6–8.3)
NEUTROPHILS NFR BLD AUTO: 44 %
NRBC # BLD AUTO: 0 10E3/UL
NRBC BLD AUTO-RTO: 0 /100
PLATELET # BLD AUTO: 163 10E3/UL (ref 150–450)
RBC # BLD AUTO: 4.4 10E6/UL (ref 3.8–5.2)
WBC # BLD AUTO: 6 10E3/UL (ref 4–11)

## 2021-10-15 PROCEDURE — 85025 COMPLETE CBC W/AUTO DIFF WBC: CPT | Mod: ZL

## 2021-10-15 PROCEDURE — 96401 CHEMO ANTI-NEOPL SQ/IM: CPT

## 2021-10-15 PROCEDURE — 36415 COLL VENOUS BLD VENIPUNCTURE: CPT | Mod: ZL

## 2021-10-15 PROCEDURE — 250N000011 HC RX IP 250 OP 636: Performed by: NURSE PRACTITIONER

## 2021-10-15 RX ADMIN — BORTEZOMIB 2.4 MG: 3.5 INJECTION, POWDER, LYOPHILIZED, FOR SOLUTION INTRAVENOUS; SUBCUTANEOUS at 13:14

## 2021-10-15 ASSESSMENT — MIFFLIN-ST. JEOR: SCORE: 1207

## 2021-10-15 ASSESSMENT — PAIN SCALES - GENERAL: PAINLEVEL: NO PAIN (0)

## 2021-10-15 NOTE — PROGRESS NOTES
Patient is 71 years old, here today for injection of Velcade per order of . Patient meets parameters for today's infusion. Patient identified with two identifiers, order verified, and verbal consent for today's infusion obtained from patient. Written consent for treatment is on file and valid.    Peripheral labs ordered. Butterfly needle inserted into right antecubital space.  Immediate blood return noted. 1 lab tube drawn. Needle removed, covered with sterile guaze and coban. Patient tolerated well, denies pain or discomfort at this time.     Component      Latest Ref Rng & Units 10/15/2021   WBC      4.0 - 11.0 10e3/uL 6.0   RBC Count      3.80 - 5.20 10e6/uL 4.40   Hemoglobin      11.7 - 15.7 g/dL 13.5   Hematocrit      35.0 - 47.0 % 39.9   MCV      78 - 100 fL 91   MCH      26.5 - 33.0 pg 30.7   MCHC      31.5 - 36.5 g/dL 33.8   RDW      10.0 - 15.0 % 14.6   Platelet Count      150 - 450 10e3/uL 163   % Neutrophils      % 44   % Lymphocytes      % 28   % Monocytes      % 19   % Eosinophils      % 5   % Basophils      % 1   % Immature Granulocytes      % 3   NRBCs per 100 WBC      <1 /100 0   Absolute Neutrophils      1.6 - 8.3 10e3/uL 2.6   Absolute Lymphocytes      0.8 - 5.3 10e3/uL 1.7   Absolute Monocytes      0.0 - 1.3 10e3/uL 1.2   Absolute Eosinophils      0.0 - 0.7 10e3/uL 0.3   Absolute Basophils      0.0 - 0.2 10e3/uL 0.0   Absolute Immature Granulocytes      <=0.0 10e3/uL 0.2 (H)   Absolute NRBCs      10e3/uL 0.0       Patient meets order parameters for today's treatment.    Patient denies questions or concerns regarding injection and/or medication(s) being administered.    Independent dose check completed with UZMA Mart.    Velcade injected SQ into right upper outer arm at a 45 degree angle per protocol rotating sites. Patient tolerated injection without incident, no signs or symptoms of adverse reaction noted. Patient denies pain or discomfort.     Covered with a sterile bandage. Pt  instructed to leave bandage intact for a minimum of one hour, and to call with questions or concerns. Copy of appointments, discharge instructions, and after visit summary (AVS) provided to patient. Patient states understanding, discharged.

## 2021-10-18 ENCOUNTER — NURSE TRIAGE (OUTPATIENT)
Dept: FAMILY MEDICINE | Facility: OTHER | Age: 71
End: 2021-10-18

## 2021-10-18 NOTE — TELEPHONE ENCOUNTER
"Cat bite that occurred this AM. Currently undergoing chemo. Requesting appt with PCP. Protocol recommends eval within 4 hours. Please advise. Thank you!      Reason for Disposition    [1] Puncture wound (hole through the skin) AND [2] from a cat bite (or deep claw puncture wound)    Additional Information    Negative: [1] Major bleeding (e.g., actively dripping or spurting) AND [2] can't be stopped    Negative: Sounds like a life-threatening emergency to the triager    Negative: Snake bite    Negative: Bite, wound, or sting from fish    Negative: [1] Any break in skin from BITE (e.g., cut, puncture or scratch) AND[2] WILD animal at risk for RABIES (e.g., bat, raccoon, vann, skunk, coyote, other carnivores)    Negative: [1] Any break in skin from BITE (e.g., cut, puncture or scratch) AND[2] PET animial (e.g., dog, cat, or ferret) at risk for RABIES (e.g., sick, stray, unprovoked bite, developing country)    Negative: [1] Any break in skin from BITE (e.g., cut, puncture or scratch) AND[2] monkey    Negative: [1] EXPOSURE of non-intact skin (e.g., exposed person has dermatitis, abrasion, wound) AND[2] with animal BODY FLUID (e.g., saliva such as licking, blood, brain) AND[3] animal at high-risk for RABIES (e.g., bat, raccoon, vann, skunk, coyote, other carnivores)    Negative: [1] Cut (length > 1/8 inch or 3 mm) or skin tear AND [2] any animal    Negative: [1] Bleeding AND [2] won't stop after 10 minutes of direct pressure (using correct technique)    Negative: Description of bite sounds severe to the triager    Answer Assessment - Initial Assessment Questions  1. ANIMAL: \"What type of animal caused the bite?\" \"Is the injury from a bite or a claw?\" If the animal is a dog or a cat, ask: \"Was it a pet or a stray?\" \"Was it acting ill or behaving strangely?\"      Pt's cat- up to date on vaccines  2. LOCATION: \"Where is the bite located?\"       R wrist  3. SIZE: \"How big is the bite?\" \"What does it look like?\"       3 " "punctures  4. ONSET: \"When did the bite happen?\" (Minutes or hours ago)       This AM  5. CIRCUMSTANCES: \"Tell me how this happened.\"       Cat was startled and bit pt  6. TETANUS: \"When was the last tetanus booster?\"      Not sure  7. PREGNANCY: \"Is there any chance you are pregnant?\" \"When was your last menstrual period?\"      na    Protocols used: ANIMAL BITE-A-AH      "

## 2021-10-19 ENCOUNTER — LAB (OUTPATIENT)
Dept: LAB | Facility: OTHER | Age: 71
End: 2021-10-19
Attending: FAMILY MEDICINE
Payer: MEDICARE

## 2021-10-19 ENCOUNTER — OFFICE VISIT (OUTPATIENT)
Dept: FAMILY MEDICINE | Facility: OTHER | Age: 71
End: 2021-10-19
Attending: FAMILY MEDICINE
Payer: MEDICARE

## 2021-10-19 VITALS
WEIGHT: 163.8 LBS | OXYGEN SATURATION: 97 % | RESPIRATION RATE: 18 BRPM | DIASTOLIC BLOOD PRESSURE: 72 MMHG | SYSTOLIC BLOOD PRESSURE: 112 MMHG | TEMPERATURE: 98.9 F | BODY MASS INDEX: 29.02 KG/M2 | HEART RATE: 87 BPM

## 2021-10-19 DIAGNOSIS — C90.00 MULTIPLE MYELOMA NOT HAVING ACHIEVED REMISSION (H): ICD-10-CM

## 2021-10-19 DIAGNOSIS — Z23 NEED FOR TDAP VACCINATION: Primary | ICD-10-CM
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
## 2021-10-19 PROBLEM — R06.02 SOB (SHORTNESS OF BREATH): Status: ACTIVE | Noted: 2021-09-20

## 2021-10-19 LAB — TOTAL PROTEIN SERUM FOR ELP: 6.9 G/DL (ref 6.8–8.8)

## 2021-10-19 PROCEDURE — 86334 IMMUNOFIX E-PHORESIS SERUM: CPT | Mod: ZL

## 2021-10-19 PROCEDURE — G0463 HOSPITAL OUTPT CLINIC VISIT: HCPCS | Mod: 25

## 2021-10-19 PROCEDURE — 82784 ASSAY IGA/IGD/IGG/IGM EACH: CPT | Mod: ZL

## 2021-10-19 PROCEDURE — 90471 IMMUNIZATION ADMIN: CPT

## 2021-10-19 PROCEDURE — 84155 ASSAY OF PROTEIN SERUM: CPT | Mod: ZL

## 2021-10-19 PROCEDURE — 99213 OFFICE O/P EST LOW 20 MIN: CPT | Performed by: FAMILY MEDICINE

## 2021-10-19 PROCEDURE — 83883 ASSAY NEPHELOMETRY NOT SPEC: CPT | Mod: ZL

## 2021-10-19 PROCEDURE — 84165 PROTEIN E-PHORESIS SERUM: CPT | Mod: ZL | Performed by: PATHOLOGY

## 2021-10-19 PROCEDURE — 36415 COLL VENOUS BLD VENIPUNCTURE: CPT | Mod: ZL

## 2021-10-19 ASSESSMENT — ENCOUNTER SYMPTOMS
FEVER: 0
SHORTNESS OF BREATH: 0
ABDOMINAL PAIN: 0
CHILLS: 0
ARTHRALGIAS: 1
PALPITATIONS: 0
COLOR CHANGE: 1

## 2021-10-19 ASSESSMENT — PAIN SCALES - GENERAL: PAINLEVEL: MILD PAIN (3)

## 2021-10-19 NOTE — PATIENT INSTRUCTIONS
Take augmentin twice per day for the next 7 days.   If the redness progresses, you develop fevers, or any other concerning signs, please call the clinic or present to emergency room.

## 2021-10-19 NOTE — PROGRESS NOTES
Assessment & Plan     Cat bite, initial encounter  H/o MM on chemo. Will send note to oncology regarding this visit. Cat is up-to-date with vaccines.  - amoxicillin-clavulanate (AUGMENTIN) 875-125 MG tablet; Take 1 tablet by mouth 2 times daily for 7 days  - recheck in one week  -Present to ER or clinic precautions given    Need for Tdap vaccination  - TD (Adult), PF, Adsorbed (TDVAX) [IMM09]  }     See Patient Instructions    Return in about 1 week (around 10/26/2021) for cat bite .    Elena Gold MD  [facility] - HIBNAHEED Mae is a 71 year old who presents for the following health issues     HPI   Last DTAP - 5/19/2017    Concern - Cat bite - Right Arm  Onset: 10/18/2021 morning   Description: Bitten by cat who was startled by something  Intensity: severe  Progression of Symptoms:  worsening and constant  Accompanying Signs & Symptoms: Swollen right arm, three puncture marks, with the redness spreading overnight up her arm  Previous history of similar problem: NA  Precipitating factors:        Worsened by: NA  Alleviating factors:        Improved by: NA  Therapies tried and outcome:  none     - cat is up to date with vaccine       Review of Systems   Constitutional: Negative for chills and fever.   HENT: Negative for congestion.    Respiratory: Negative for shortness of breath.    Cardiovascular: Negative for chest pain and palpitations.   Gastrointestinal: Negative for abdominal pain.   Musculoskeletal: Positive for arthralgias (right wrist).   Skin: Positive for color change (right forearm).          Objective    /72 (BP Location: Left arm, Patient Position: Chair, Cuff Size: Adult Regular)   Pulse 87   Temp 98.9  F (37.2  C) (Tympanic)   Resp 18   Wt 74.3 kg (163 lb 12.8 oz)   SpO2 97%   BMI 29.02 kg/m    Body mass index is 29.02 kg/m .  Physical Exam  Constitutional:       General: She is not in acute distress.     Appearance: She is not ill-appearing.    Cardiovascular:      Rate and Rhythm: Normal rate and regular rhythm.      Heart sounds: No murmur heard.     Pulmonary:      Effort: Pulmonary effort is normal. No respiratory distress.      Breath sounds: No wheezing or rales.   Musculoskeletal:      Comments: Right forearm: Erythematous with 3 puncture marks.  Erythema is traveling proximal.  Radial pulse 2+.  Sensation intact.  Full range of motion of all digits and wrist.    Neurological:      Mental Status: She is alert.              Media Information             Document Information    Photograph   Right arm   10/19/2021 8:15 AM   Attached To:   Office Visit on 10/19/21 with Elena Gold MD     Source Information    lEena Gold MD  [facility]       Media Information             Document Information    Photograph   Right arm   10/19/2021 8:15 AM   Attached To:   Office Visit on 10/19/21 with Elena Gold MD     Source Information    Elena Gold MD  [facility]

## 2021-10-20 LAB
ALBUMIN SERPL ELPH-MCNC: 4.1 G/DL (ref 3.7–5.1)
ALPHA1 GLOB SERPL ELPH-MCNC: 0.4 G/DL (ref 0.2–0.4)
ALPHA2 GLOB SERPL ELPH-MCNC: 0.8 G/DL (ref 0.5–0.9)
B-GLOBULIN SERPL ELPH-MCNC: 0.7 G/DL (ref 0.6–1)
GAMMA GLOB SERPL ELPH-MCNC: 0.9 G/DL (ref 0.7–1.6)
IGA SERPL-MCNC: 15 MG/DL (ref 84–499)
IGG SERPL-MCNC: 940 MG/DL (ref 610–1616)
IGM SERPL-MCNC: 16 MG/DL (ref 35–242)
KAPPA LC FREE SER-MCNC: 0.53 MG/DL (ref 0.33–1.94)
KAPPA LC FREE/LAMBDA FREE SER NEPH: 2.12 {RATIO} (ref 0.26–1.65)
LAMBDA LC FREE SERPL-MCNC: 0.25 MG/DL (ref 0.57–2.63)
M PROTEIN SERPL ELPH-MCNC: 0.3 G/DL
PROT PATTERN SERPL ELPH-IMP: ABNORMAL
PROT PATTERN SERPL IFE-IMP: NORMAL

## 2021-10-20 PROCEDURE — 86334 IMMUNOFIX E-PHORESIS SERUM: CPT | Mod: 26

## 2021-10-20 PROCEDURE — 84165 PROTEIN E-PHORESIS SERUM: CPT | Mod: 26

## 2021-10-20 PROCEDURE — 84165 PROTEIN E-PHORESIS SERUM: CPT | Mod: 26 | Performed by: PATHOLOGY

## 2021-10-25 ENCOUNTER — OFFICE VISIT (OUTPATIENT)
Dept: FAMILY MEDICINE | Facility: OTHER | Age: 71
End: 2021-10-25
Attending: NURSE PRACTITIONER
Payer: COMMERCIAL

## 2021-10-25 VITALS
RESPIRATION RATE: 20 BRPM | OXYGEN SATURATION: 96 % | WEIGHT: 163 LBS | TEMPERATURE: 98.7 F | SYSTOLIC BLOOD PRESSURE: 110 MMHG | HEART RATE: 76 BPM | DIASTOLIC BLOOD PRESSURE: 70 MMHG | BODY MASS INDEX: 28.88 KG/M2

## 2021-10-25 DIAGNOSIS — C90.00 MULTIPLE MYELOMA NOT HAVING ACHIEVED REMISSION (H): Primary | ICD-10-CM
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
## 2021-10-25 PROCEDURE — G0463 HOSPITAL OUTPT CLINIC VISIT: HCPCS

## 2021-10-25 PROCEDURE — 99213 OFFICE O/P EST LOW 20 MIN: CPT | Performed by: NURSE PRACTITIONER

## 2021-10-25 RX ORDER — LORAZEPAM 2 MG/ML
0.5 INJECTION INTRAMUSCULAR EVERY 4 HOURS PRN
Status: CANCELLED | OUTPATIENT
Start: 2021-11-06

## 2021-10-25 RX ORDER — ALBUTEROL SULFATE 0.83 MG/ML
2.5 SOLUTION RESPIRATORY (INHALATION)
Status: CANCELLED | OUTPATIENT
Start: 2021-10-30

## 2021-10-25 RX ORDER — ALBUTEROL SULFATE 90 UG/1
1-2 AEROSOL, METERED RESPIRATORY (INHALATION)
Status: CANCELLED
Start: 2021-11-06

## 2021-10-25 RX ORDER — HEPARIN SODIUM (PORCINE) LOCK FLUSH IV SOLN 100 UNIT/ML 100 UNIT/ML
5 SOLUTION INTRAVENOUS
Status: CANCELLED | OUTPATIENT
Start: 2021-10-30

## 2021-10-25 RX ORDER — EPINEPHRINE 1 MG/ML
0.3 INJECTION, SOLUTION, CONCENTRATE INTRAVENOUS EVERY 5 MIN PRN
Status: CANCELLED | OUTPATIENT
Start: 2021-11-06

## 2021-10-25 RX ORDER — HEPARIN SODIUM,PORCINE 10 UNIT/ML
5 VIAL (ML) INTRAVENOUS
Status: CANCELLED | OUTPATIENT
Start: 2021-10-27

## 2021-10-25 RX ORDER — SODIUM CHLORIDE 9 MG/ML
1000 INJECTION, SOLUTION INTRAVENOUS CONTINUOUS PRN
Status: CANCELLED
Start: 2021-11-06

## 2021-10-25 RX ORDER — NALOXONE HYDROCHLORIDE 0.4 MG/ML
.1-.4 INJECTION, SOLUTION INTRAMUSCULAR; INTRAVENOUS; SUBCUTANEOUS
Status: CANCELLED | OUTPATIENT
Start: 2021-11-06

## 2021-10-25 RX ORDER — ALBUTEROL SULFATE 90 UG/1
1-2 AEROSOL, METERED RESPIRATORY (INHALATION)
Status: CANCELLED
Start: 2021-10-30

## 2021-10-25 RX ORDER — DIPHENHYDRAMINE HYDROCHLORIDE 50 MG/ML
50 INJECTION INTRAMUSCULAR; INTRAVENOUS
Status: CANCELLED
Start: 2021-11-03

## 2021-10-25 RX ORDER — METHYLPREDNISOLONE SODIUM SUCCINATE 125 MG/2ML
125 INJECTION, POWDER, LYOPHILIZED, FOR SOLUTION INTRAMUSCULAR; INTRAVENOUS
Status: CANCELLED
Start: 2021-11-03

## 2021-10-25 RX ORDER — DIPHENHYDRAMINE HYDROCHLORIDE 50 MG/ML
50 INJECTION INTRAMUSCULAR; INTRAVENOUS
Status: CANCELLED
Start: 2021-10-30

## 2021-10-25 RX ORDER — ALBUTEROL SULFATE 0.83 MG/ML
2.5 SOLUTION RESPIRATORY (INHALATION)
Status: CANCELLED | OUTPATIENT
Start: 2021-11-06

## 2021-10-25 RX ORDER — EPINEPHRINE 1 MG/ML
0.3 INJECTION, SOLUTION, CONCENTRATE INTRAVENOUS EVERY 5 MIN PRN
Status: CANCELLED | OUTPATIENT
Start: 2021-11-03

## 2021-10-25 RX ORDER — ALBUTEROL SULFATE 0.83 MG/ML
2.5 SOLUTION RESPIRATORY (INHALATION)
Status: CANCELLED | OUTPATIENT
Start: 2021-11-03

## 2021-10-25 RX ORDER — HEPARIN SODIUM (PORCINE) LOCK FLUSH IV SOLN 100 UNIT/ML 100 UNIT/ML
5 SOLUTION INTRAVENOUS
Status: CANCELLED | OUTPATIENT
Start: 2021-11-03

## 2021-10-25 RX ORDER — MEPERIDINE HYDROCHLORIDE 25 MG/ML
25 INJECTION INTRAMUSCULAR; INTRAVENOUS; SUBCUTANEOUS EVERY 30 MIN PRN
Status: CANCELLED | OUTPATIENT
Start: 2021-10-27

## 2021-10-25 RX ORDER — HEPARIN SODIUM (PORCINE) LOCK FLUSH IV SOLN 100 UNIT/ML 100 UNIT/ML
5 SOLUTION INTRAVENOUS
Status: CANCELLED | OUTPATIENT
Start: 2021-11-06

## 2021-10-25 RX ORDER — SODIUM CHLORIDE 9 MG/ML
1000 INJECTION, SOLUTION INTRAVENOUS CONTINUOUS PRN
Status: CANCELLED
Start: 2021-10-30

## 2021-10-25 RX ORDER — NALOXONE HYDROCHLORIDE 0.4 MG/ML
.1-.4 INJECTION, SOLUTION INTRAMUSCULAR; INTRAVENOUS; SUBCUTANEOUS
Status: CANCELLED | OUTPATIENT
Start: 2021-10-27

## 2021-10-25 RX ORDER — NALOXONE HYDROCHLORIDE 0.4 MG/ML
.1-.4 INJECTION, SOLUTION INTRAMUSCULAR; INTRAVENOUS; SUBCUTANEOUS
Status: CANCELLED | OUTPATIENT
Start: 2021-10-30

## 2021-10-25 RX ORDER — EPINEPHRINE 1 MG/ML
0.3 INJECTION, SOLUTION, CONCENTRATE INTRAVENOUS EVERY 5 MIN PRN
Status: CANCELLED | OUTPATIENT
Start: 2021-10-30

## 2021-10-25 RX ORDER — DIPHENHYDRAMINE HYDROCHLORIDE 50 MG/ML
50 INJECTION INTRAMUSCULAR; INTRAVENOUS
Status: CANCELLED
Start: 2021-10-27

## 2021-10-25 RX ORDER — METHYLPREDNISOLONE SODIUM SUCCINATE 125 MG/2ML
125 INJECTION, POWDER, LYOPHILIZED, FOR SOLUTION INTRAMUSCULAR; INTRAVENOUS
Status: CANCELLED
Start: 2021-11-06

## 2021-10-25 RX ORDER — METHYLPREDNISOLONE SODIUM SUCCINATE 125 MG/2ML
125 INJECTION, POWDER, LYOPHILIZED, FOR SOLUTION INTRAMUSCULAR; INTRAVENOUS
Status: CANCELLED
Start: 2021-10-30

## 2021-10-25 RX ORDER — DIPHENHYDRAMINE HYDROCHLORIDE 50 MG/ML
50 INJECTION INTRAMUSCULAR; INTRAVENOUS
Status: CANCELLED
Start: 2021-11-06

## 2021-10-25 RX ORDER — MEPERIDINE HYDROCHLORIDE 25 MG/ML
25 INJECTION INTRAMUSCULAR; INTRAVENOUS; SUBCUTANEOUS EVERY 30 MIN PRN
Status: CANCELLED | OUTPATIENT
Start: 2021-11-03

## 2021-10-25 RX ORDER — ALBUTEROL SULFATE 90 UG/1
1-2 AEROSOL, METERED RESPIRATORY (INHALATION)
Status: CANCELLED
Start: 2021-11-03

## 2021-10-25 RX ORDER — SODIUM CHLORIDE 9 MG/ML
1000 INJECTION, SOLUTION INTRAVENOUS CONTINUOUS PRN
Status: CANCELLED
Start: 2021-11-03

## 2021-10-25 RX ORDER — LORAZEPAM 2 MG/ML
0.5 INJECTION INTRAMUSCULAR EVERY 4 HOURS PRN
Status: CANCELLED | OUTPATIENT
Start: 2021-11-03

## 2021-10-25 RX ORDER — ALBUTEROL SULFATE 90 UG/1
1-2 AEROSOL, METERED RESPIRATORY (INHALATION)
Status: CANCELLED
Start: 2021-10-27

## 2021-10-25 RX ORDER — MEPERIDINE HYDROCHLORIDE 25 MG/ML
25 INJECTION INTRAMUSCULAR; INTRAVENOUS; SUBCUTANEOUS EVERY 30 MIN PRN
Status: CANCELLED | OUTPATIENT
Start: 2021-10-30

## 2021-10-25 RX ORDER — MEPERIDINE HYDROCHLORIDE 25 MG/ML
25 INJECTION INTRAMUSCULAR; INTRAVENOUS; SUBCUTANEOUS EVERY 30 MIN PRN
Status: CANCELLED | OUTPATIENT
Start: 2021-11-06

## 2021-10-25 RX ORDER — LORAZEPAM 2 MG/ML
0.5 INJECTION INTRAMUSCULAR EVERY 4 HOURS PRN
Status: CANCELLED | OUTPATIENT
Start: 2021-10-30

## 2021-10-25 RX ORDER — NALOXONE HYDROCHLORIDE 0.4 MG/ML
.1-.4 INJECTION, SOLUTION INTRAMUSCULAR; INTRAVENOUS; SUBCUTANEOUS
Status: CANCELLED | OUTPATIENT
Start: 2021-11-03

## 2021-10-25 RX ORDER — HEPARIN SODIUM,PORCINE 10 UNIT/ML
5 VIAL (ML) INTRAVENOUS
Status: CANCELLED | OUTPATIENT
Start: 2021-11-03

## 2021-10-25 RX ORDER — EPINEPHRINE 1 MG/ML
0.3 INJECTION, SOLUTION, CONCENTRATE INTRAVENOUS EVERY 5 MIN PRN
Status: CANCELLED | OUTPATIENT
Start: 2021-10-27

## 2021-10-25 RX ORDER — HEPARIN SODIUM,PORCINE 10 UNIT/ML
5 VIAL (ML) INTRAVENOUS
Status: CANCELLED | OUTPATIENT
Start: 2021-11-06

## 2021-10-25 RX ORDER — SODIUM CHLORIDE 9 MG/ML
1000 INJECTION, SOLUTION INTRAVENOUS CONTINUOUS PRN
Status: CANCELLED
Start: 2021-10-27

## 2021-10-25 RX ORDER — HEPARIN SODIUM,PORCINE 10 UNIT/ML
5 VIAL (ML) INTRAVENOUS
Status: CANCELLED | OUTPATIENT
Start: 2021-10-30

## 2021-10-25 RX ORDER — LORAZEPAM 2 MG/ML
0.5 INJECTION INTRAMUSCULAR EVERY 4 HOURS PRN
Status: CANCELLED | OUTPATIENT
Start: 2021-10-27

## 2021-10-25 RX ORDER — METHYLPREDNISOLONE SODIUM SUCCINATE 125 MG/2ML
125 INJECTION, POWDER, LYOPHILIZED, FOR SOLUTION INTRAMUSCULAR; INTRAVENOUS
Status: CANCELLED
Start: 2021-10-27

## 2021-10-25 RX ORDER — ALBUTEROL SULFATE 0.83 MG/ML
2.5 SOLUTION RESPIRATORY (INHALATION)
Status: CANCELLED | OUTPATIENT
Start: 2021-10-27

## 2021-10-25 RX ORDER — HEPARIN SODIUM (PORCINE) LOCK FLUSH IV SOLN 100 UNIT/ML 100 UNIT/ML
5 SOLUTION INTRAVENOUS
Status: CANCELLED | OUTPATIENT
Start: 2021-10-27

## 2021-10-25 ASSESSMENT — ENCOUNTER SYMPTOMS
FATIGUE: 0
FEVER: 0
CHILLS: 0

## 2021-10-25 ASSESSMENT — PAIN SCALES - GENERAL: PAINLEVEL: NO PAIN (0)

## 2021-10-25 NOTE — NURSING NOTE
"Chief Complaint   Patient presents with     RECHECK     Cat bite       Initial /70 (BP Location: Left arm, Patient Position: Sitting, Cuff Size: Adult Regular)   Pulse 76   Temp 98.7  F (37.1  C) (Tympanic)   Resp 20   Wt 73.9 kg (163 lb)   SpO2 96%   BMI 28.88 kg/m   Estimated body mass index is 28.88 kg/m  as calculated from the following:    Height as of 10/15/21: 1.6 m (5' 2.99\").    Weight as of this encounter: 73.9 kg (163 lb).  Medication Reconciliation: complete  Manuela Neal LPN    "

## 2021-10-25 NOTE — PATIENT INSTRUCTIONS
Thank you for choosing us for your care. I think an in-clinic visit would be best next steps based on your symptoms. Please schedule a clinic appointment; you won t be charged for this eVisit.      You can schedule an appointment right here in [facility], or call [phone]

## 2021-10-25 NOTE — PATIENT INSTRUCTIONS
1. Cat bite, subsequent encounter  Continue for 3 more days of Augmentin twice a day. Keep clean and covered. I do recommend you use a medicated ointment such as neosporin. You mentioned that you have mupirocin and this would be great to use at this time.

## 2021-10-25 NOTE — PROGRESS NOTES
Assessment & Plan   1. Cat bite, subsequent encounter   Carmelita is under the impression that any extension of antibiotics would put her next round of chemo on hold.  I did attempt to reach out to Bonita Simmons CNP and Dr. Walker in Oncology via phone. Not able to reach either. I spoke with Dr. Gold who saw her last. She did not have this same impression. I reassured Carmelita that I would forward this chart to her oncology team. I do think it is important to continue the antibiotics a bit longer to make sure the infection has been fully dealt with. She is in agreement with the plan.  Continue for 3 more days of Augmentin twice a day. Keep clean and covered. I do recommend you use a medicated ointment such as neosporin. You mentioned that you have mupirocin and this would be great to use at this time.     - amoxicillin-clavulanate (AUGMENTIN) 875-125 MG tablet; Take 1 tablet by mouth 2 times daily for 3 days  Dispense: 6 tablet; Refill: 0      Prescription drug management       Return if symptoms worsen or fail to improve.    Mica Camp, CNP  [facility] - [facility]    Lin Mae is a 71 year old who presents for the following health issues      HPI     Concern - cat bite  Onset: 10/18/21  Description: cat bite  Intensity: moderate  Progression of Symptoms:  improving  Accompanying Signs & Symptoms: red  Previous history of similar problem: no  Precipitating factors:        Worsened by: nothing  Alleviating factors:        Improved by: nothing  Therapies tried and outcome: antibiotic      Review of Systems   Constitutional: Negative for chills, fatigue and fever.   Hematological:        On chemo. Has plan for chemo for Tuesday and Friday this week. Works with [facility] oncology team.       Constitutional, HEENT, cardiovascular, pulmonary, gi and gu systems are negative, except as otherwise noted.      Objective    /70 (BP Location: Left arm, Patient Position: Sitting, Cuff  Size: Adult Regular)   Pulse 76   Temp 98.7  F (37.1  C) (Tympanic)   Resp 20   Wt 73.9 kg (163 lb)   SpO2 96%   BMI 28.88 kg/m    Body mass index is 28.88 kg/m .     Physical Exam  Constitutional:       General: She is not in acute distress.     Appearance: She is not ill-appearing.   Cardiovascular:      Rate and Rhythm: Normal rate and regular rhythm.   Skin:     Comments: Right forearm: 4 small puncture wounds noted. 3 are healed. One remains slightly erythremic with a small amount of firmness beneath. Tenderness of this local area only. The forearm is marked with a skin marker of past erythema border. All of the this has resolved and there is no longer erythema or edema extending past that one puncture wound.    Neurological:      Mental Status: She is alert.

## 2021-10-26 ENCOUNTER — ONCOLOGY VISIT (OUTPATIENT)
Dept: ONCOLOGY | Facility: OTHER | Age: 71
End: 2021-10-26
Attending: NURSE PRACTITIONER
Payer: MEDICARE

## 2021-10-26 ENCOUNTER — INFUSION THERAPY VISIT (OUTPATIENT)
Dept: INFUSION THERAPY | Facility: OTHER | Age: 71
End: 2021-10-26
Attending: INTERNAL MEDICINE
Payer: MEDICARE

## 2021-10-26 ENCOUNTER — APPOINTMENT (OUTPATIENT)
Dept: LAB | Facility: OTHER | Age: 71
End: 2021-10-26
Attending: NURSE PRACTITIONER
Payer: MEDICARE

## 2021-10-26 VITALS
RESPIRATION RATE: 20 BRPM | WEIGHT: 163.58 LBS | BODY MASS INDEX: 28.98 KG/M2 | OXYGEN SATURATION: 95 % | HEART RATE: 90 BPM | TEMPERATURE: 99.3 F | SYSTOLIC BLOOD PRESSURE: 120 MMHG | DIASTOLIC BLOOD PRESSURE: 70 MMHG | HEIGHT: 63 IN

## 2021-10-26 DIAGNOSIS — C90.00 MULTIPLE MYELOMA NOT HAVING ACHIEVED REMISSION (H): Primary | ICD-10-CM

## 2021-10-26 LAB
ALBUMIN SERPL-MCNC: 3.5 G/DL (ref 3.4–5)
ALP SERPL-CCNC: 106 U/L (ref 40–150)
ALT SERPL W P-5'-P-CCNC: 13 U/L (ref 0–50)
ANION GAP SERPL CALCULATED.3IONS-SCNC: 5 MMOL/L (ref 3–14)
AST SERPL W P-5'-P-CCNC: 10 U/L (ref 0–45)
BASOPHILS # BLD AUTO: 0 10E3/UL (ref 0–0.2)
BASOPHILS NFR BLD AUTO: 0 %
BILIRUB SERPL-MCNC: 0.4 MG/DL (ref 0.2–1.3)
BUN SERPL-MCNC: 19 MG/DL (ref 7–30)
CALCIUM SERPL-MCNC: 9.4 MG/DL (ref 8.5–10.1)
CHLORIDE BLD-SCNC: 106 MMOL/L (ref 94–109)
CO2 SERPL-SCNC: 24 MMOL/L (ref 20–32)
CREAT SERPL-MCNC: 0.64 MG/DL (ref 0.52–1.04)
EOSINOPHIL # BLD AUTO: 0 10E3/UL (ref 0–0.7)
EOSINOPHIL NFR BLD AUTO: 0 %
ERYTHROCYTE [DISTWIDTH] IN BLOOD BY AUTOMATED COUNT: 14.8 % (ref 10–15)
GFR SERPL CREATININE-BSD FRML MDRD: 89 ML/MIN/1.73M2
GLUCOSE BLD-MCNC: 149 MG/DL (ref 70–99)
HCT VFR BLD AUTO: 39.8 % (ref 35–47)
HGB BLD-MCNC: 13.5 G/DL (ref 11.7–15.7)
IMM GRANULOCYTES # BLD: 0.1 10E3/UL
IMM GRANULOCYTES NFR BLD: 2 %
LYMPHOCYTES # BLD AUTO: 0.4 10E3/UL (ref 0.8–5.3)
LYMPHOCYTES NFR BLD AUTO: 7 %
MCH RBC QN AUTO: 30.7 PG (ref 26.5–33)
MCHC RBC AUTO-ENTMCNC: 33.9 G/DL (ref 31.5–36.5)
MCV RBC AUTO: 91 FL (ref 78–100)
MONOCYTES # BLD AUTO: 0.2 10E3/UL (ref 0–1.3)
MONOCYTES NFR BLD AUTO: 3 %
NEUTROPHILS # BLD AUTO: 5.6 10E3/UL (ref 1.6–8.3)
NEUTROPHILS NFR BLD AUTO: 88 %
NRBC # BLD AUTO: 0 10E3/UL
NRBC BLD AUTO-RTO: 0 /100
PLATELET # BLD AUTO: 276 10E3/UL (ref 150–450)
POTASSIUM BLD-SCNC: 4 MMOL/L (ref 3.4–5.3)
PROT SERPL-MCNC: 7.4 G/DL (ref 6.8–8.8)
RBC # BLD AUTO: 4.4 10E6/UL (ref 3.8–5.2)
SODIUM SERPL-SCNC: 135 MMOL/L (ref 133–144)
WBC # BLD AUTO: 6.3 10E3/UL (ref 4–11)

## 2021-10-26 PROCEDURE — 85025 COMPLETE CBC W/AUTO DIFF WBC: CPT | Mod: ZL | Performed by: INTERNAL MEDICINE

## 2021-10-26 PROCEDURE — 36415 COLL VENOUS BLD VENIPUNCTURE: CPT | Mod: ZL | Performed by: INTERNAL MEDICINE

## 2021-10-26 PROCEDURE — G0463 HOSPITAL OUTPT CLINIC VISIT: HCPCS | Mod: 25

## 2021-10-26 PROCEDURE — 36591 DRAW BLOOD OFF VENOUS DEVICE: CPT | Mod: ZL | Performed by: INTERNAL MEDICINE

## 2021-10-26 PROCEDURE — 99213 OFFICE O/P EST LOW 20 MIN: CPT | Performed by: INTERNAL MEDICINE

## 2021-10-26 PROCEDURE — 250N000011 HC RX IP 250 OP 636: Performed by: INTERNAL MEDICINE

## 2021-10-26 PROCEDURE — 82040 ASSAY OF SERUM ALBUMIN: CPT | Mod: ZL | Performed by: INTERNAL MEDICINE

## 2021-10-26 PROCEDURE — 250N000013 HC RX MED GY IP 250 OP 250 PS 637: Performed by: INTERNAL MEDICINE

## 2021-10-26 PROCEDURE — G0463 HOSPITAL OUTPT CLINIC VISIT: HCPCS

## 2021-10-26 PROCEDURE — 96401 CHEMO ANTI-NEOPL SQ/IM: CPT

## 2021-10-26 RX ORDER — ACETAMINOPHEN 325 MG/1
650 TABLET ORAL ONCE
Status: COMPLETED | OUTPATIENT
Start: 2021-10-26 | End: 2021-10-26

## 2021-10-26 RX ORDER — DIPHENHYDRAMINE HCL 50 MG
50 CAPSULE ORAL ONCE
Status: COMPLETED | OUTPATIENT
Start: 2021-10-26 | End: 2021-10-26

## 2021-10-26 RX ADMIN — BORTEZOMIB 2.4 MG: 3.5 INJECTION, POWDER, LYOPHILIZED, FOR SOLUTION INTRAVENOUS; SUBCUTANEOUS at 15:01

## 2021-10-26 RX ADMIN — DIPHENHYDRAMINE HYDROCHLORIDE 50 MG: 50 CAPSULE ORAL at 14:31

## 2021-10-26 RX ADMIN — ACETAMINOPHEN 650 MG: 325 TABLET, FILM COATED ORAL at 14:31

## 2021-10-26 RX ADMIN — DARATUMUMAB AND HYALURONIDASE-FIHJ (HUMAN RECOMBINANT) 1800 MG: 1800; 30000 INJECTION SUBCUTANEOUS at 15:09

## 2021-10-26 ASSESSMENT — PAIN SCALES - GENERAL: PAINLEVEL: NO PAIN (0)

## 2021-10-26 ASSESSMENT — MIFFLIN-ST. JEOR: SCORE: 1216.13

## 2021-10-26 NOTE — PROGRESS NOTES
Patient is 71  years old, here today accompanied by self for injection of faslodex per order of Dr. Walker under the supervision of      Patient  lab values are WNL reviewed by this provider     Labs meet parameters for today's injection.     Patient denies questions nor concerns regarding medication, administration site, side effects, nor aftercare.  Patient identified with two identifiers, order verified, and verbal consent for today's injection obtained from patient.   Patient education provided on s/s of injection site infection, and/or medication specific side effects, and when to call a provider.  Patient instructed to report any adverse effects.     Medication administered per protocol in left abdomen at 45 degree angle.  Site covered with sterile bandage.  Patient tolerated injection well, no verbal nor non-verbal signs of discomfort noted.  No adverse effects noted at this time.     Patient instructed to call with further questions or concerns.  Patient states understanding and is in agreement with this plan.  Copy of appointments, and after visit summary (AVS) given to patient. Patient discharged.

## 2021-10-26 NOTE — PROGRESS NOTES
Visit Date: 10/26/2021    HEMATOLOGY/ONCOLOGY CLINIC NOTE     HISTORY OF PRESENT ILLNESS:  Ms. Watts returns for followup of IgA multiple myeloma.  For details, see previous notes.  The patient was initiated on CyBorD regimen.  She progressed on Velcade and Decadron.  The patient was started on CyBorD regimen with Velcade given at a dose of 1.5 mg/m2 weekly on days 1, 4, 8, 15 and 22 of a 28-day regimen.  Cytoxan was given at a dose of 140 mg/m2 weekly or 200 mg weekly on days 1, 4, 8, 15 and 22.  She received a reduced dose of Cytoxan based on an immunosuppressive state.  She will also continue on Zovirax prophylaxis.  Dexamethasone was given 40 mg weekly on days 1, 4, 8, 15 and 22.  After 1 cycle, her M spike had dropped as well as her IgG level.  The patient completed 3 cycles of CyBorD with Cytoxan being dose reduced 150 mg every 7 days.  Nonetheless, her M spike dropped from 6.2, all the way down to 1.2.  She completed 35 cycles.  She had been followed by Bonita Quintana, nurse practitioner, over the past 10 months.  Her M spike remained at 1 and it was elected to initiate Darzalex or Faspro 1800 mg subcutaneously as per protocol.  Subsequently, on 03/23/2021, her M spike increased from 1-1.2 and it was decided to add Velcade and dexamethasone to the plan.  She was started on Darzalex or Faspro 1800 mg subcutaneously per protocol, Velcade 1.3 mg/m2 and dexamethasone 20 mg added to day 4, 8, and 11 with cycle given every 21 days.  Zometa was also given 4 mg every 3 months.  Velcade and dexamethasone were added to cycle 5 of Faspro. Since then, her M spike has been dropping.  After cycle 9, her M spike had dropped to 0.6.  After her 12th cycle, she continued to drop, and most recently, her M spike had dropped 0.4.  She had seen Bonita Quintana, nurse practitioner, on 10/05.  At that time, she had completed her most recent cycle of chemotherapy and completed 13 cycles with drop in M spike to 0.4.  She went on to  begin cycle 14 on 10/05.  In the interim, apparently she developed a cat bite from a house cat that bit her right upper extremity on 10/19.  She was treated with Augmentin by Dr. Gold on 10/19, 875 mg b.i.d. for 7 days.  Apparently, she was seen by Mica Camp yesterday, who felt she needed 3 more days of Augmentin.  The patient states the erythema has improved considerably.  She does have cat bite marks at this point.  She did receive a tetanus toxoid.  Otherwise, she tolerating Darzalex well without any significant neuropathy, fevers, night sweats, weight loss.  Overall, she is doing well.  She is exercising.    PHYSICAL EXAMINATION:    GENERAL:  She is an elderly white female in no acute distress.  VITAL SIGNS:  Blood pressure 120/70, pulse 90, respirations 20, temperature 99.3.  HEENT:  Atraumatic, normocephalic.  Oropharynx nonerythematous.  NECK:  Supple.  LUNGS:  Clear to auscultation and percussion.  HEART:  Regular rhythm.  S1, S2 normal.  ABDOMEN:  Soft, normoactive bowel sounds.  No mass, nontender.    EXTREMITIES:  No edema.  LYMPHATICS:  No cervical, supraclavicular or axillary nodes.  EXTREMITIES:  Reveal 2 cat bite marks of the right forearm with minimal erythema.  NEUROLOGIC:  Nonfocal.    LABORATORY DATA:  CBC with white count 6.3, H and H 13.5 and 39.8, platelet count 276.  Serum protein electrophoresis reveals an M spike of 0.3 with monoclonal IgM globulin kappa light chain-type.  Kappa-lambda ratio was 2.12.    IMPRESSION:    1.  IgA kappa multiple myeloma with 80% involvement of bone marrow.  IPSS is stage IIA.  Elevated kappa- lambda ratio standard risk cytogenetics, deemed a candidate for Velcade, Revlimid and Decadron.  The patient could not afford Revlimid; therefore, she was started on Velcade and Decadron.  She refused stem cell transplant evaluation and consultation at the .  She received 2 cycles with positive drop in M protein.  Subsequently, she developed a rise in M protein,  rising kappa- lambda ratio, was initiated on CyBorD regimen in 10/2019. She had a positive response after 1 cycle, M spike dropped from 6.2 down 3.8 and after 5 cycles had dropped to 1.2 and remained stable after 36 cycles.  She was switched to daratumumab subcutaneously to be given on days 1, 8, 15 and 22 as per protocol.  After 4 cycles, her M spike had dropped to 2, but it magy from 1 to 1.2.  We elected to add Velcade and Decadron.  She did receive at least 6 cycles of Velcade and Decadron and daratumumab or 11 cycles. She was here for her 12th cycle.  After 12 cycles, her M spike a drop to 0.4.  After 13 cycles, it remain 0.4.  The patient completed 14 cycles of Faspro, Velcade and Decadron.  Plan is to proceed with cycle 15.  There is no contraindication to therapy.  2.  Cat bite, essentially resolved.  No evidence of cellulitis.  The patient will complete antibiotics within the next 2 days as per her primary care physician.  Otherwise, we will see the patient for her next cycle, obtain CBC, CMP, LDH, myeloma profile     Seventy-two minutes was spent on this patient.  Time was spent reviewing lab results, physician provider notes, performing history and physical, documenting history and physical, ordering followup labs and ordering Darzalex, Velcade and Decadron.    Elías Walker MD        D: 10/26/2021   T: 10/26/2021   MT: [id number]    Name:     WILLIAM WILDER  MRN:      [id number]        Account:    [id number]   :      1950           Visit Date: 10/26/2021     Document: [id number]

## 2021-10-26 NOTE — PROGRESS NOTES
Peripheral labs ordered. Butterfly needle inserted into LT ARM.  Immediate blood return noted. TWO lab tubes drawn. Needle removed, covered with sterile guaze and coban. Patient tolerated well, denies pain or discomfort at this time. Patient discharged.

## 2021-10-26 NOTE — PROGRESS NOTES
Patient is a 71 year old female her today for injection of Velcade per order of . Patient identified with two identifiers, order verified, and verbal consent for today's infusion obtained from patient. Written consent for treatment is on file and valid.    Recent lab values: WNL and Patient meets order parameters for today's treatment.    Velcade dose verified with Barron RICH RN, prior to release of drug.      Patient denies questions or concerns regarding injection and/or medication(s) being administered.    Velcade injected SQ into Left back arm at 45 degree angle per protocol rotating sites. Patient tolerated injection without incident, no signs or symptoms of adverse reaction noted. Patient denies pain or discomfort.     Covered with a sterile bandage. Pt instructed to leave bandage intact for a minimum of one hour, and to call with questions or concerns. Copy of appointments, discharge instructions, and after visit summary (AVS) provided to patient. Patient states understanding, discharged.

## 2021-10-26 NOTE — PROGRESS NOTES
Patient assessed by provider during office visit.  Completed a quick assessment with patient and she reports no changes to her general health, reports having good health, improved strength and decreased fatigue.  Reports having improved mental health.

## 2021-10-26 NOTE — NURSING NOTE
"Oncology Rooming Note    October 26, 2021 1:48 PM   Carmelita Watts is a 71 year old female who presents for:    Chief Complaint   Patient presents with     Oncology Clinic Visit     Follow up Multiple myeloma not having achieved remission      Initial Vitals: /70   Pulse 90   Temp 99.3  F (37.4  C) (Tympanic)   Resp 20   Ht 1.6 m (5' 3\")   Wt 74.2 kg (163 lb 9.3 oz)   SpO2 95%   BMI 28.98 kg/m   Estimated body mass index is 28.98 kg/m  as calculated from the following:    Height as of this encounter: 1.6 m (5' 3\").    Weight as of this encounter: 74.2 kg (163 lb 9.3 oz). Body surface area is 1.82 meters squared.  No Pain (0) Comment: Data Unavailable   No LMP recorded. Patient is postmenopausal.  Allergies reviewed: Yes  Medications reviewed: Yes    Medications: Medication refills not needed today.  Pharmacy name entered into [facility]:    [id number] [place], [place] - [address] [facility] [address]  [facility] - [place], [place] - [address]            "

## 2021-10-29 ENCOUNTER — INFUSION THERAPY VISIT (OUTPATIENT)
Dept: INFUSION THERAPY | Facility: OTHER | Age: 71
End: 2021-10-29
Attending: INTERNAL MEDICINE
Payer: MEDICARE

## 2021-10-29 VITALS — BODY MASS INDEX: 28.9 KG/M2 | WEIGHT: 163.14 LBS

## 2021-10-29 DIAGNOSIS — C90.00 MULTIPLE MYELOMA NOT HAVING ACHIEVED REMISSION (H): Primary | ICD-10-CM

## 2021-10-29 PROCEDURE — 96401 CHEMO ANTI-NEOPL SQ/IM: CPT

## 2021-10-29 PROCEDURE — 250N000011 HC RX IP 250 OP 636: Performed by: INTERNAL MEDICINE

## 2021-10-29 RX ADMIN — BORTEZOMIB 2.4 MG: 3.5 INJECTION, POWDER, LYOPHILIZED, FOR SOLUTION INTRAVENOUS; SUBCUTANEOUS at 13:12

## 2021-10-29 NOTE — PROGRESS NOTES
Patient is a 71 year old female her today for injection of Velcade per order of . Patient identified with two identifiers, order verified, and verbal consent for today's infusion obtained from patient. Written consent for treatment is on file and valid.    Velcade dose verified with Yancy GUSMAN, RN, prior to release of drug.      Patient denies questions or concerns regarding injection and/or medication(s) being administered.    Velcade injected SQ into right  at 45 degree angle per protocol rotating sites. Patient tolerated injection without incident, no signs or symptoms of adverse reaction noted. Patient denies pain or discomfort.     Covered with a sterile bandage. Pt instructed to leave bandage intact for a minimum of one hour, and to call with questions or concerns. Copy of appointments, discharge instructions, and after visit summary (AVS) provided to patient. Patient states understanding, discharged.

## 2021-11-02 ENCOUNTER — INFUSION THERAPY VISIT (OUTPATIENT)
Dept: INFUSION THERAPY | Facility: OTHER | Age: 71
End: 2021-11-02
Attending: INTERNAL MEDICINE
Payer: MEDICARE

## 2021-11-02 VITALS
RESPIRATION RATE: 18 BRPM | SYSTOLIC BLOOD PRESSURE: 122 MMHG | BODY MASS INDEX: 29.06 KG/M2 | HEART RATE: 81 BPM | HEIGHT: 63 IN | DIASTOLIC BLOOD PRESSURE: 84 MMHG | TEMPERATURE: 98.5 F | OXYGEN SATURATION: 95 % | WEIGHT: 164.02 LBS

## 2021-11-02 DIAGNOSIS — C90.00 MULTIPLE MYELOMA NOT HAVING ACHIEVED REMISSION (H): Primary | ICD-10-CM

## 2021-11-02 LAB
BASOPHILS # BLD AUTO: 0 10E3/UL (ref 0–0.2)
BASOPHILS NFR BLD AUTO: 0 %
EOSINOPHIL # BLD AUTO: 0.2 10E3/UL (ref 0–0.7)
EOSINOPHIL NFR BLD AUTO: 4 %
ERYTHROCYTE [DISTWIDTH] IN BLOOD BY AUTOMATED COUNT: 14.8 % (ref 10–15)
HCT VFR BLD AUTO: 38.1 % (ref 35–47)
HGB BLD-MCNC: 12.9 G/DL (ref 11.7–15.7)
IMM GRANULOCYTES # BLD: 0.1 10E3/UL
IMM GRANULOCYTES NFR BLD: 2 %
LYMPHOCYTES # BLD AUTO: 1 10E3/UL (ref 0.8–5.3)
LYMPHOCYTES NFR BLD AUTO: 20 %
MCH RBC QN AUTO: 30.6 PG (ref 26.5–33)
MCHC RBC AUTO-ENTMCNC: 33.9 G/DL (ref 31.5–36.5)
MCV RBC AUTO: 90 FL (ref 78–100)
MONOCYTES # BLD AUTO: 0.4 10E3/UL (ref 0–1.3)
MONOCYTES NFR BLD AUTO: 9 %
NEUTROPHILS # BLD AUTO: 3.2 10E3/UL (ref 1.6–8.3)
NEUTROPHILS NFR BLD AUTO: 65 %
NRBC # BLD AUTO: 0 10E3/UL
NRBC BLD AUTO-RTO: 0 /100
PLATELET # BLD AUTO: 205 10E3/UL (ref 150–450)
RBC # BLD AUTO: 4.22 10E6/UL (ref 3.8–5.2)
WBC # BLD AUTO: 4.8 10E3/UL (ref 4–11)

## 2021-11-02 PROCEDURE — 85004 AUTOMATED DIFF WBC COUNT: CPT | Mod: ZL | Performed by: INTERNAL MEDICINE

## 2021-11-02 PROCEDURE — 96401 CHEMO ANTI-NEOPL SQ/IM: CPT

## 2021-11-02 PROCEDURE — 250N000011 HC RX IP 250 OP 636: Mod: JW | Performed by: INTERNAL MEDICINE

## 2021-11-02 PROCEDURE — 36415 COLL VENOUS BLD VENIPUNCTURE: CPT | Mod: ZL | Performed by: INTERNAL MEDICINE

## 2021-11-02 RX ADMIN — BORTEZOMIB 2.4 MG: 3.5 INJECTION, POWDER, LYOPHILIZED, FOR SOLUTION INTRAVENOUS; SUBCUTANEOUS at 13:32

## 2021-11-02 ASSESSMENT — MIFFLIN-ST. JEOR: SCORE: 1218

## 2021-11-02 NOTE — PROGRESS NOTES
Patient is a 71 year old here today for injection of Velcade per order of . Patient meets parameters for today's infusion. Patient identified with two identifiers, order verified, and verbal consent for today's infusion obtained from patient. Written consent for treatment is on file and valid.    Component      Latest Ref Rng & Units 11/2/2021   WBC      4.0 - 11.0 10e3/uL 4.8   RBC Count      3.80 - 5.20 10e6/uL 4.22   Hemoglobin      11.7 - 15.7 g/dL 12.9   Hematocrit      35.0 - 47.0 % 38.1   MCV      78 - 100 fL 90   MCH      26.5 - 33.0 pg 30.6   MCHC      31.5 - 36.5 g/dL 33.9   RDW      10.0 - 15.0 % 14.8   Platelet Count      150 - 450 10e3/uL 205   % Neutrophils      % 65   % Lymphocytes      % 20   % Monocytes      % 9   % Eosinophils      % 4   % Basophils      % 0   % Immature Granulocytes      % 2   NRBCs per 100 WBC      <1 /100 0   Absolute Neutrophils      1.6 - 8.3 10e3/uL 3.2   Absolute Lymphocytes      0.8 - 5.3 10e3/uL 1.0   Absolute Monocytes      0.0 - 1.3 10e3/uL 0.4   Absolute Eosinophils      0.0 - 0.7 10e3/uL 0.2   Absolute Basophils      0.0 - 0.2 10e3/uL 0.0   Absolute Immature Granulocytes      <=0.0 10e3/uL 0.1 (H)   Absolute NRBCs      10e3/uL 0.0     Independent dose check completed with MARKO Lan RN prior to release of drug.    Patient meets order parameters for today's treatment.    Patient denies questions or concerns regarding injection and/or medication(s) being administered.    Velcade injected SQ into left upper outer arm at 45 degree angle per protocol rotating sites.     Injection administered per protocol. Patient tolerated infusion without incident, no signs or symptoms of adverse reaction noted. Patient denies pain or discomfort.     Covered with a sterile bandage. Pt instructed to leave bandage intact for a minimum of one hour, and to call with questions or concerns. Patient declines copy of appointments, discharge instructions, and after visit summary (AVS).  Patient states understanding, discharged ambulatory.

## 2021-11-02 NOTE — PATIENT INSTRUCTIONS

## 2021-11-05 ENCOUNTER — INFUSION THERAPY VISIT (OUTPATIENT)
Dept: INFUSION THERAPY | Facility: OTHER | Age: 71
End: 2021-11-05
Attending: INTERNAL MEDICINE
Payer: MEDICARE

## 2021-11-05 VITALS
WEIGHT: 164.68 LBS | HEIGHT: 63 IN | HEART RATE: 86 BPM | SYSTOLIC BLOOD PRESSURE: 113 MMHG | BODY MASS INDEX: 29.18 KG/M2 | DIASTOLIC BLOOD PRESSURE: 75 MMHG | OXYGEN SATURATION: 98 % | TEMPERATURE: 98.7 F

## 2021-11-05 DIAGNOSIS — C90.00 MULTIPLE MYELOMA NOT HAVING ACHIEVED REMISSION (H): Primary | ICD-10-CM

## 2021-11-05 LAB
BASOPHILS # BLD AUTO: 0 10E3/UL (ref 0–0.2)
BASOPHILS NFR BLD AUTO: 0 %
EOSINOPHIL # BLD AUTO: 0.1 10E3/UL (ref 0–0.7)
EOSINOPHIL NFR BLD AUTO: 3 %
ERYTHROCYTE [DISTWIDTH] IN BLOOD BY AUTOMATED COUNT: 15.1 % (ref 10–15)
HCT VFR BLD AUTO: 40.3 % (ref 35–47)
HGB BLD-MCNC: 13.5 G/DL (ref 11.7–15.7)
IMM GRANULOCYTES # BLD: 0.1 10E3/UL
IMM GRANULOCYTES NFR BLD: 3 %
LYMPHOCYTES # BLD AUTO: 1.4 10E3/UL (ref 0.8–5.3)
LYMPHOCYTES NFR BLD AUTO: 26 %
MCH RBC QN AUTO: 30.9 PG (ref 26.5–33)
MCHC RBC AUTO-ENTMCNC: 33.5 G/DL (ref 31.5–36.5)
MCV RBC AUTO: 92 FL (ref 78–100)
MONOCYTES # BLD AUTO: 1 10E3/UL (ref 0–1.3)
MONOCYTES NFR BLD AUTO: 19 %
NEUTROPHILS # BLD AUTO: 2.5 10E3/UL (ref 1.6–8.3)
NEUTROPHILS NFR BLD AUTO: 49 %
NRBC # BLD AUTO: 0 10E3/UL
NRBC BLD AUTO-RTO: 1 /100
PLATELET # BLD AUTO: 160 10E3/UL (ref 150–450)
RBC # BLD AUTO: 4.37 10E6/UL (ref 3.8–5.2)
WBC # BLD AUTO: 5.1 10E3/UL (ref 4–11)

## 2021-11-05 PROCEDURE — 250N000011 HC RX IP 250 OP 636: Performed by: INTERNAL MEDICINE

## 2021-11-05 PROCEDURE — 96401 CHEMO ANTI-NEOPL SQ/IM: CPT

## 2021-11-05 PROCEDURE — 36591 DRAW BLOOD OFF VENOUS DEVICE: CPT | Mod: ZL

## 2021-11-05 PROCEDURE — 85025 COMPLETE CBC W/AUTO DIFF WBC: CPT | Mod: ZL

## 2021-11-05 RX ADMIN — BORTEZOMIB 2.4 MG: 3.5 INJECTION, POWDER, LYOPHILIZED, FOR SOLUTION INTRAVENOUS; SUBCUTANEOUS at 13:21

## 2021-11-05 ASSESSMENT — MIFFLIN-ST. JEOR: SCORE: 1221

## 2021-11-05 NOTE — PATIENT INSTRUCTIONS

## 2021-11-05 NOTE — PROGRESS NOTES
Patient is a 71 year old here today for injection of Velcade per order of . Patient meets parameters for today's infusion. Patient identified with two identifiers, order verified, and verbal consent for today's infusion obtained from patient. Written consent for treatment is on file and valid.    Component      Latest Ref Rng & Units 11/5/2021   WBC      4.0 - 11.0 10e3/uL 5.1   RBC Count      3.80 - 5.20 10e6/uL 4.37   Hemoglobin      11.7 - 15.7 g/dL 13.5   Hematocrit      35.0 - 47.0 % 40.3   MCV      78 - 100 fL 92   MCH      26.5 - 33.0 pg 30.9   MCHC      31.5 - 36.5 g/dL 33.5   RDW      10.0 - 15.0 % 15.1 (H)   Platelet Count      150 - 450 10e3/uL 160   % Neutrophils      % 49   % Lymphocytes      % 26   % Monocytes      % 19   % Eosinophils      % 3   % Basophils      % 0   % Immature Granulocytes      % 3   NRBCs per 100 WBC      <1 /100 1 (H)   Absolute Neutrophils      1.6 - 8.3 10e3/uL 2.5   Absolute Lymphocytes      0.8 - 5.3 10e3/uL 1.4   Absolute Monocytes      0.0 - 1.3 10e3/uL 1.0   Absolute Eosinophils      0.0 - 0.7 10e3/uL 0.1   Absolute Basophils      0.0 - 0.2 10e3/uL 0.0   Absolute Immature Granulocytes      <=0.0 10e3/uL 0.1 (H)   Absolute NRBCs      10e3/uL 0.0       Patient meets order parameters for today's treatment.    Independent dose check completed with MARKO Lan RN prior to release of drug.    Patient denies questions or concerns regarding injection and/or medication(s) being administered.    Velcade injected SQ into right upper outer arm at 45 degree angle per protocol rotating sites.     Injection administered per protocol. Patient tolerated infusion without incident, no signs or symptoms of adverse reaction noted. Patient denies pain or discomfort.     Covered with a sterile bandage. Pt instructed to leave bandage intact for a minimum of one hour, and to call with questions or concerns. Copy of appointments, discharge instructions, and after visit summary (AVS)  provided to patient. Patient states understanding, discharged ambulatory.

## 2021-11-09 ENCOUNTER — LAB (OUTPATIENT)
Dept: LAB | Facility: OTHER | Age: 71
End: 2021-11-09
Attending: FAMILY MEDICINE
Payer: MEDICARE

## 2021-11-09 DIAGNOSIS — C90.00 MULTIPLE MYELOMA NOT HAVING ACHIEVED REMISSION (H): Primary | ICD-10-CM

## 2021-11-09 DIAGNOSIS — C90.00 MULTIPLE MYELOMA NOT HAVING ACHIEVED REMISSION (H): ICD-10-CM

## 2021-11-09 PROCEDURE — 82232 ASSAY OF BETA-2 PROTEIN: CPT | Mod: ZL

## 2021-11-09 PROCEDURE — 84155 ASSAY OF PROTEIN SERUM: CPT | Mod: ZL

## 2021-11-09 PROCEDURE — 84165 PROTEIN E-PHORESIS SERUM: CPT | Mod: ZL | Performed by: PATHOLOGY

## 2021-11-09 PROCEDURE — 82784 ASSAY IGA/IGD/IGG/IGM EACH: CPT | Mod: ZL

## 2021-11-09 PROCEDURE — 83883 ASSAY NEPHELOMETRY NOT SPEC: CPT | Mod: ZL

## 2021-11-09 PROCEDURE — 85810 BLOOD VISCOSITY EXAMINATION: CPT | Mod: ZL

## 2021-11-09 PROCEDURE — 86334 IMMUNOFIX E-PHORESIS SERUM: CPT | Mod: ZL

## 2021-11-09 PROCEDURE — 36415 COLL VENOUS BLD VENIPUNCTURE: CPT | Mod: ZL

## 2021-11-10 LAB — TOTAL PROTEIN SERUM FOR ELP: 7.3 G/DL (ref 6.8–8.8)

## 2021-11-11 LAB
ALBUMIN SERPL ELPH-MCNC: 4.4 G/DL (ref 3.7–5.1)
ALPHA1 GLOB SERPL ELPH-MCNC: 0.4 G/DL (ref 0.2–0.4)
ALPHA2 GLOB SERPL ELPH-MCNC: 0.8 G/DL (ref 0.5–0.9)
B-GLOBULIN SERPL ELPH-MCNC: 0.8 G/DL (ref 0.6–1)
B2 MICROGLOB TUMOR MARKER SER-MCNC: 2.7 MG/L
GAMMA GLOB SERPL ELPH-MCNC: 0.9 G/DL (ref 0.7–1.6)
IGA SERPL-MCNC: 13 MG/DL (ref 84–499)
IGG SERPL-MCNC: 1005 MG/DL (ref 610–1616)
IGM SERPL-MCNC: 21 MG/DL (ref 35–242)
KAPPA LC FREE SER-MCNC: 0.47 MG/DL (ref 0.33–1.94)
KAPPA LC FREE/LAMBDA FREE SER NEPH: 2.14 {RATIO} (ref 0.26–1.65)
LAMBDA LC FREE SERPL-MCNC: 0.22 MG/DL (ref 0.57–2.63)
M PROTEIN SERPL ELPH-MCNC: 0.2 G/DL
PROT PATTERN SERPL ELPH-IMP: ABNORMAL
PROT PATTERN SERPL IFE-IMP: NORMAL
VISC SER: 1.5 CP (ref 1.4–1.8)

## 2021-11-11 PROCEDURE — 84165 PROTEIN E-PHORESIS SERUM: CPT | Mod: 26 | Performed by: PATHOLOGY

## 2021-11-11 PROCEDURE — 86334 IMMUNOFIX E-PHORESIS SERUM: CPT | Mod: 26 | Performed by: PATHOLOGY

## 2021-11-11 NOTE — PROGRESS NOTES
Assessment & Plan     Hypertension goal BP (blood pressure) < 140/80  She is going to be given refill on Medications.  She will see us back in 3 months. Watching her in 3 month increments.  Not checking at home.     Hyperlipidemia LDL goal <130  She is going to be given follow up last labs in August of 21 and LDL was up quiet a bit.  She is aware of dietary changes and will do so.  Her myeloma is being treated by Dr. Scott.  Was given 2 years and has surpassed that.  Weight up a little bit.      Type 2 diabetes mellitus with hyperosmolarity without coma, without long-term current use of insulin (H)  She is going to be given a follow up in 3 months. She needs A1C.  Eye exam is up to date. Living alone now, has a cat.    - Hemoglobin A1c; Future    Review of external notes as documented elsewhere in note  Ordering of each unique test  Prescription drug management  5  minutes spent on the date of the encounter doing chart review, review of outside records, review of test results, interpretation of tests, patient visit and documentation        See Patient Instructions    No follow-ups on file.    VENKATESH Trevino  [facility] - KERRI Ceballos   Carmelita is a 71 year old who presents for the following health issues     HPI     Diabetes Follow-up    How often are you checking your blood sugar? A few times a week  What time of day are you checking your blood sugars (select all that apply)?  Before meals  Have you had any blood sugars above 200?  No  Have you had any blood sugars below 70?  No    What symptoms do you notice when your blood sugar is low?  None    What concerns do you have today about your diabetes? None     Do you have any of these symptoms? (Select all that apply)  No numbness or tingling in feet.  No redness, sores or blisters on feet.  No complaints of excessive thirst.  No reports of blurry vision.  No significant changes to weight.              Hyperlipidemia  Follow-Up      Are you regularly taking any medication or supplement to lower your cholesterol?   Yes- lipitor    Are you having muscle aches or other side effects that you think could be caused by your cholesterol lowering medication?  No    Hypertension Follow-up      Do you check your blood pressure regularly outside of the clinic? No     Are you following a low salt diet? No    Are your blood pressures ever more than 140 on the top number (systolic) OR more   than 90 on the bottom number (diastolic), for example 140/90? No    BP Readings from Last 2 Encounters:   11/17/21 110/80   11/16/21 119/82     Hemoglobin A1C (%)   Date Value   08/17/2021 6.4 (H)   04/27/2021 6.4 (H)   01/26/2021 6.3 (H)     LDL Cholesterol Calculated (mg/dL)   Date Value   08/17/2021 166 (H)   04/27/2021 137 (H)   02/14/2020 125 (H)       Depression and Anxiety Follow-Up    How are you doing with your depression since your last visit? No change    How are you doing with your anxiety since your last visit?  No change    Are you having other symptoms that might be associated with depression or anxiety? No    Have you had a significant life event? Has cancer and lost her .  Got a cat now and seems to make her life much better.     Do you have any concerns with your use of alcohol or other drugs? No    Social History     Tobacco Use     Smoking status: Never Smoker     Smokeless tobacco: Never Used     Tobacco comment: Tried to Quit (YES); QUIT in 1971; Passive Exposure (NO)   Substance Use Topics     Alcohol use: Yes     Comment: RARELY     Drug use: No     PHQ 4/19/2021 8/16/2021 11/17/2021   PHQ-9 Total Score 3 0 0   Q9: Thoughts of better off dead/self-harm past 2 weeks Not at all Not at all Not at all     MINAL-7 SCORE 10/16/2020 8/16/2021 11/17/2021   Total Score 1 0 0     Last PHQ-9 11/17/2021   1.  Little interest or pleasure in doing things 0   2.  Feeling down, depressed, or hopeless 0   3.  Trouble falling or staying asleep,  "or sleeping too much 0   4.  Feeling tired or having little energy 0   5.  Poor appetite or overeating 0   6.  Feeling bad about yourself 0   7.  Trouble concentrating 0   8.  Moving slowly or restless 0   Q9: Thoughts of better off dead/self-harm past 2 weeks 0   PHQ-9 Total Score 0   Difficulty at work, home, or with people -     MINAL-7  11/17/2021   1. Feeling nervous, anxious, or on edge 0   2. Not being able to stop or control worrying 0   3. Worrying too much about different things 0   4. Trouble relaxing 0   5. Being so restless that it is hard to sit still 0   6. Becoming easily annoyed or irritable 0   7. Feeling afraid, as if something awful might happen 0   MINAL-7 Total Score 0   If you checked any problems, how difficult have they made it for you to do your work, take care of things at home, or get along with other people? -       Suicide Assessment Five-step Evaluation and Treatment (SAFE-T)        Review of Systems   CONSTITUTIONAL: NEGATIVE for fever, chills, change in weight  INTEGUMENTARY/SKIN: NEGATIVE for worrisome rashes, moles or lesions  ENT/MOUTH: NEGATIVE for ear, mouth and throat problems  RESP: NEGATIVE for significant cough or SOB  CV: NEGATIVE for chest pain, palpitations or peripheral edema  MUSCULOSKELETAL: NEGATIVE for significant arthralgias or myalgia  NEURO: NEGATIVE for weakness, dizziness or paresthesias  ENDOCRINE: NEGATIVE for temperature intolerance, skin/hair changes      Objective    /80 (BP Location: Right arm, Patient Position: Sitting, Cuff Size: Adult Regular)   Pulse 89   Temp 98.8  F (37.1  C) (Tympanic)   Ht 1.598 m (5' 2.9\")   Wt 75.3 kg (166 lb)   SpO2 97%   BMI 29.50 kg/m    Body mass index is 29.5 kg/m .  Physical Exam   GENERAL: healthy, alert and no distress  EYES: Eyes grossly normal to inspection, PERRL and conjunctivae and sclerae normal  HENT: ear canals and TM's normal, nose and mouth without ulcers or lesions  NECK: no adenopathy, no asymmetry, " masses, or scars and thyroid normal to palpation  RESP: lungs clear to auscultation - no rales, rhonchi or wheezes  CV: regular rate and rhythm, normal S1 S2, no S3 or S4, no murmur, click or rub, no peripheral edema and peripheral pulses strong  ABDOMEN: soft, nontender, no hepatosplenomegaly, no masses and bowel sounds normal  MS: no gross musculoskeletal defects noted, no edema  SKIN: no suspicious lesions or rashes  NEURO: Normal strength and tone, mentation intact and speech normal  BACK: no CVA tenderness, no paralumbar tenderness  LYMPH: no cervical, supraclavicular, axillary, or inguinal adenopathy  Diabetic foot exam: normal DP and PT pulses, no trophic changes or ulcerative lesions and normal sensory exam    Oncology Visit on 11/16/2021   Component Date Value Ref Range Status     Sodium 11/16/2021 137  133 - 144 mmol/L Final     Potassium 11/16/2021 3.9  3.4 - 5.3 mmol/L Final     Chloride 11/16/2021 107  94 - 109 mmol/L Final     Carbon Dioxide (CO2) 11/16/2021 25  20 - 32 mmol/L Final     Anion Gap 11/16/2021 5  3 - 14 mmol/L Final     Urea Nitrogen 11/16/2021 18  7 - 30 mg/dL Final     Creatinine 11/16/2021 0.73  0.52 - 1.04 mg/dL Final     Calcium 11/16/2021 9.3  8.5 - 10.1 mg/dL Final     Glucose 11/16/2021 106* 70 - 99 mg/dL Final     Alkaline Phosphatase 11/16/2021 128  40 - 150 U/L Final     AST 11/16/2021 13  0 - 45 U/L Final     ALT 11/16/2021 17  0 - 50 U/L Final     Protein Total 11/16/2021 6.7* 6.8 - 8.8 g/dL Final     Albumin 11/16/2021 3.4  3.4 - 5.0 g/dL Final     Bilirubin Total 11/16/2021 0.4  0.2 - 1.3 mg/dL Final     GFR Estimate 11/16/2021 82  >60 mL/min/1.73m2 Final    As of July 11, 2021, eGFR is calculated by the CKD-EPI creatinine equation, without race adjustment. eGFR can be influenced by muscle mass, exercise, and diet. The reported eGFR is an estimation only and is only applicable if the renal function is stable.     WBC Count 11/16/2021 6.0  4.0 - 11.0 10e3/uL Final     RBC  Count 11/16/2021 4.16  3.80 - 5.20 10e6/uL Final     Hemoglobin 11/16/2021 12.8  11.7 - 15.7 g/dL Final     Hematocrit 11/16/2021 38.3  35.0 - 47.0 % Final     MCV 11/16/2021 92  78 - 100 fL Final     MCH 11/16/2021 30.8  26.5 - 33.0 pg Final     MCHC 11/16/2021 33.4  31.5 - 36.5 g/dL Final     RDW 11/16/2021 15.4* 10.0 - 15.0 % Final     Platelet Count 11/16/2021 266  150 - 450 10e3/uL Final     % Neutrophils 11/16/2021 64  % Final     % Lymphocytes 11/16/2021 19  % Final     % Monocytes 11/16/2021 11  % Final     % Eosinophils 11/16/2021 4  % Final     % Basophils 11/16/2021 1  % Final     % Immature Granulocytes 11/16/2021 1  % Final     NRBCs per 100 WBC 11/16/2021 0  <1 /100 Final     Absolute Neutrophils 11/16/2021 3.8  1.6 - 8.3 10e3/uL Final     Absolute Lymphocytes 11/16/2021 1.1  0.8 - 5.3 10e3/uL Final     Absolute Monocytes 11/16/2021 0.7  0.0 - 1.3 10e3/uL Final     Absolute Eosinophils 11/16/2021 0.3  0.0 - 0.7 10e3/uL Final     Absolute Basophils 11/16/2021 0.0  0.0 - 0.2 10e3/uL Final     Absolute Immature Granulocytes 11/16/2021 0.1* <=0.0 10e3/uL Final     Absolute NRBCs 11/16/2021 0.0  10e3/uL Final

## 2021-11-15 DIAGNOSIS — C90.00 MULTIPLE MYELOMA NOT HAVING ACHIEVED REMISSION (H): Primary | ICD-10-CM

## 2021-11-15 DIAGNOSIS — Z91.199 FAILURE TO ATTEND APPOINTMENT: Primary | ICD-10-CM

## 2021-11-15 PROCEDURE — 10000001 PR ERRONEOUS ENCOUNTER--DISREGARD: Performed by: NURSE PRACTITIONER

## 2021-11-15 RX ORDER — MEPERIDINE HYDROCHLORIDE 25 MG/ML
25 INJECTION INTRAMUSCULAR; INTRAVENOUS; SUBCUTANEOUS EVERY 30 MIN PRN
Status: CANCELLED | OUTPATIENT
Start: 2021-11-19

## 2021-11-15 RX ORDER — HEPARIN SODIUM (PORCINE) LOCK FLUSH IV SOLN 100 UNIT/ML 100 UNIT/ML
5 SOLUTION INTRAVENOUS
Status: CANCELLED | OUTPATIENT
Start: 2021-11-17

## 2021-11-15 RX ORDER — ALBUTEROL SULFATE 0.83 MG/ML
2.5 SOLUTION RESPIRATORY (INHALATION)
Status: CANCELLED | OUTPATIENT
Start: 2021-11-26

## 2021-11-15 RX ORDER — HEPARIN SODIUM,PORCINE 10 UNIT/ML
5 VIAL (ML) INTRAVENOUS
Status: CANCELLED | OUTPATIENT
Start: 2021-11-26

## 2021-11-15 RX ORDER — SODIUM CHLORIDE 9 MG/ML
1000 INJECTION, SOLUTION INTRAVENOUS CONTINUOUS PRN
Status: CANCELLED
Start: 2021-11-19

## 2021-11-15 RX ORDER — LORAZEPAM 2 MG/ML
0.5 INJECTION INTRAMUSCULAR EVERY 4 HOURS PRN
Status: CANCELLED | OUTPATIENT
Start: 2021-11-19

## 2021-11-15 RX ORDER — DIPHENHYDRAMINE HYDROCHLORIDE 50 MG/ML
50 INJECTION INTRAMUSCULAR; INTRAVENOUS
Status: CANCELLED
Start: 2021-11-19

## 2021-11-15 RX ORDER — SODIUM CHLORIDE 9 MG/ML
1000 INJECTION, SOLUTION INTRAVENOUS CONTINUOUS PRN
Status: CANCELLED
Start: 2021-11-26

## 2021-11-15 RX ORDER — METHYLPREDNISOLONE SODIUM SUCCINATE 125 MG/2ML
125 INJECTION, POWDER, LYOPHILIZED, FOR SOLUTION INTRAMUSCULAR; INTRAVENOUS
Status: CANCELLED
Start: 2021-11-19

## 2021-11-15 RX ORDER — DIPHENHYDRAMINE HYDROCHLORIDE 50 MG/ML
50 INJECTION INTRAMUSCULAR; INTRAVENOUS
Status: CANCELLED
Start: 2021-11-23

## 2021-11-15 RX ORDER — ALBUTEROL SULFATE 90 UG/1
1-2 AEROSOL, METERED RESPIRATORY (INHALATION)
Status: CANCELLED
Start: 2021-11-19

## 2021-11-15 RX ORDER — SODIUM CHLORIDE 9 MG/ML
1000 INJECTION, SOLUTION INTRAVENOUS CONTINUOUS PRN
Status: CANCELLED
Start: 2021-11-17

## 2021-11-15 RX ORDER — HEPARIN SODIUM,PORCINE 10 UNIT/ML
5 VIAL (ML) INTRAVENOUS
Status: CANCELLED | OUTPATIENT
Start: 2021-11-23

## 2021-11-15 RX ORDER — NALOXONE HYDROCHLORIDE 0.4 MG/ML
.1-.4 INJECTION, SOLUTION INTRAMUSCULAR; INTRAVENOUS; SUBCUTANEOUS
Status: CANCELLED | OUTPATIENT
Start: 2021-11-23

## 2021-11-15 RX ORDER — HEPARIN SODIUM (PORCINE) LOCK FLUSH IV SOLN 100 UNIT/ML 100 UNIT/ML
5 SOLUTION INTRAVENOUS
Status: CANCELLED | OUTPATIENT
Start: 2021-11-26

## 2021-11-15 RX ORDER — HEPARIN SODIUM,PORCINE 10 UNIT/ML
5 VIAL (ML) INTRAVENOUS
Status: CANCELLED | OUTPATIENT
Start: 2021-11-19

## 2021-11-15 RX ORDER — LORAZEPAM 2 MG/ML
0.5 INJECTION INTRAMUSCULAR EVERY 4 HOURS PRN
Status: CANCELLED | OUTPATIENT
Start: 2021-11-17

## 2021-11-15 RX ORDER — ALBUTEROL SULFATE 0.83 MG/ML
2.5 SOLUTION RESPIRATORY (INHALATION)
Status: CANCELLED | OUTPATIENT
Start: 2021-11-23

## 2021-11-15 RX ORDER — HEPARIN SODIUM (PORCINE) LOCK FLUSH IV SOLN 100 UNIT/ML 100 UNIT/ML
5 SOLUTION INTRAVENOUS
Status: CANCELLED | OUTPATIENT
Start: 2021-11-23

## 2021-11-15 RX ORDER — ALBUTEROL SULFATE 90 UG/1
1-2 AEROSOL, METERED RESPIRATORY (INHALATION)
Status: CANCELLED
Start: 2021-11-26

## 2021-11-15 RX ORDER — NALOXONE HYDROCHLORIDE 0.4 MG/ML
.1-.4 INJECTION, SOLUTION INTRAMUSCULAR; INTRAVENOUS; SUBCUTANEOUS
Status: CANCELLED | OUTPATIENT
Start: 2021-11-26

## 2021-11-15 RX ORDER — NALOXONE HYDROCHLORIDE 0.4 MG/ML
.1-.4 INJECTION, SOLUTION INTRAMUSCULAR; INTRAVENOUS; SUBCUTANEOUS
Status: CANCELLED | OUTPATIENT
Start: 2021-11-19

## 2021-11-15 RX ORDER — DIPHENHYDRAMINE HYDROCHLORIDE 50 MG/ML
50 INJECTION INTRAMUSCULAR; INTRAVENOUS
Status: CANCELLED
Start: 2021-11-17

## 2021-11-15 RX ORDER — MEPERIDINE HYDROCHLORIDE 25 MG/ML
25 INJECTION INTRAMUSCULAR; INTRAVENOUS; SUBCUTANEOUS EVERY 30 MIN PRN
Status: CANCELLED | OUTPATIENT
Start: 2021-11-17

## 2021-11-15 RX ORDER — ALBUTEROL SULFATE 90 UG/1
1-2 AEROSOL, METERED RESPIRATORY (INHALATION)
Status: CANCELLED
Start: 2021-11-23

## 2021-11-15 RX ORDER — LORAZEPAM 2 MG/ML
0.5 INJECTION INTRAMUSCULAR EVERY 4 HOURS PRN
Status: CANCELLED | OUTPATIENT
Start: 2021-11-26

## 2021-11-15 RX ORDER — MEPERIDINE HYDROCHLORIDE 25 MG/ML
25 INJECTION INTRAMUSCULAR; INTRAVENOUS; SUBCUTANEOUS EVERY 30 MIN PRN
Status: CANCELLED | OUTPATIENT
Start: 2021-11-26

## 2021-11-15 RX ORDER — HEPARIN SODIUM (PORCINE) LOCK FLUSH IV SOLN 100 UNIT/ML 100 UNIT/ML
5 SOLUTION INTRAVENOUS
Status: CANCELLED | OUTPATIENT
Start: 2021-11-19

## 2021-11-15 RX ORDER — EPINEPHRINE 1 MG/ML
0.3 INJECTION, SOLUTION, CONCENTRATE INTRAVENOUS EVERY 5 MIN PRN
Status: CANCELLED | OUTPATIENT
Start: 2021-11-17

## 2021-11-15 RX ORDER — LORAZEPAM 2 MG/ML
0.5 INJECTION INTRAMUSCULAR EVERY 4 HOURS PRN
Status: CANCELLED | OUTPATIENT
Start: 2021-11-23

## 2021-11-15 RX ORDER — METHYLPREDNISOLONE SODIUM SUCCINATE 125 MG/2ML
125 INJECTION, POWDER, LYOPHILIZED, FOR SOLUTION INTRAMUSCULAR; INTRAVENOUS
Status: CANCELLED
Start: 2021-11-23

## 2021-11-15 RX ORDER — DIPHENHYDRAMINE HYDROCHLORIDE 50 MG/ML
50 INJECTION INTRAMUSCULAR; INTRAVENOUS
Status: CANCELLED
Start: 2021-11-26

## 2021-11-15 RX ORDER — SODIUM CHLORIDE 9 MG/ML
1000 INJECTION, SOLUTION INTRAVENOUS CONTINUOUS PRN
Status: CANCELLED
Start: 2021-11-23

## 2021-11-15 RX ORDER — HEPARIN SODIUM,PORCINE 10 UNIT/ML
5 VIAL (ML) INTRAVENOUS
Status: CANCELLED | OUTPATIENT
Start: 2021-11-17

## 2021-11-15 RX ORDER — EPINEPHRINE 1 MG/ML
0.3 INJECTION, SOLUTION, CONCENTRATE INTRAVENOUS EVERY 5 MIN PRN
Status: CANCELLED | OUTPATIENT
Start: 2021-11-19

## 2021-11-15 RX ORDER — MEPERIDINE HYDROCHLORIDE 25 MG/ML
25 INJECTION INTRAMUSCULAR; INTRAVENOUS; SUBCUTANEOUS EVERY 30 MIN PRN
Status: CANCELLED | OUTPATIENT
Start: 2021-11-23

## 2021-11-15 RX ORDER — METHYLPREDNISOLONE SODIUM SUCCINATE 125 MG/2ML
125 INJECTION, POWDER, LYOPHILIZED, FOR SOLUTION INTRAMUSCULAR; INTRAVENOUS
Status: CANCELLED
Start: 2021-11-26

## 2021-11-15 RX ORDER — ALBUTEROL SULFATE 90 UG/1
1-2 AEROSOL, METERED RESPIRATORY (INHALATION)
Status: CANCELLED
Start: 2021-11-17

## 2021-11-15 RX ORDER — METHYLPREDNISOLONE SODIUM SUCCINATE 125 MG/2ML
125 INJECTION, POWDER, LYOPHILIZED, FOR SOLUTION INTRAMUSCULAR; INTRAVENOUS
Status: CANCELLED
Start: 2021-11-17

## 2021-11-15 RX ORDER — ALBUTEROL SULFATE 0.83 MG/ML
2.5 SOLUTION RESPIRATORY (INHALATION)
Status: CANCELLED | OUTPATIENT
Start: 2021-11-17

## 2021-11-15 RX ORDER — ALBUTEROL SULFATE 0.83 MG/ML
2.5 SOLUTION RESPIRATORY (INHALATION)
Status: CANCELLED | OUTPATIENT
Start: 2021-11-19

## 2021-11-15 RX ORDER — EPINEPHRINE 1 MG/ML
0.3 INJECTION, SOLUTION, CONCENTRATE INTRAVENOUS EVERY 5 MIN PRN
Status: CANCELLED | OUTPATIENT
Start: 2021-11-26

## 2021-11-15 RX ORDER — EPINEPHRINE 1 MG/ML
0.3 INJECTION, SOLUTION, CONCENTRATE INTRAVENOUS EVERY 5 MIN PRN
Status: CANCELLED | OUTPATIENT
Start: 2021-11-23

## 2021-11-15 RX ORDER — NALOXONE HYDROCHLORIDE 0.4 MG/ML
.1-.4 INJECTION, SOLUTION INTRAMUSCULAR; INTRAVENOUS; SUBCUTANEOUS
Status: CANCELLED | OUTPATIENT
Start: 2021-11-17

## 2021-11-16 ENCOUNTER — INFUSION THERAPY VISIT (OUTPATIENT)
Dept: INFUSION THERAPY | Facility: OTHER | Age: 71
End: 2021-11-16
Attending: INTERNAL MEDICINE
Payer: MEDICARE

## 2021-11-16 ENCOUNTER — ONCOLOGY VISIT (OUTPATIENT)
Dept: ONCOLOGY | Facility: OTHER | Age: 71
End: 2021-11-16
Attending: NURSE PRACTITIONER
Payer: MEDICARE

## 2021-11-16 VITALS
WEIGHT: 166.89 LBS | RESPIRATION RATE: 16 BRPM | DIASTOLIC BLOOD PRESSURE: 82 MMHG | TEMPERATURE: 98.3 F | OXYGEN SATURATION: 95 % | SYSTOLIC BLOOD PRESSURE: 119 MMHG | HEART RATE: 90 BPM | HEIGHT: 63 IN | BODY MASS INDEX: 29.57 KG/M2

## 2021-11-16 DIAGNOSIS — C90.00 MULTIPLE MYELOMA NOT HAVING ACHIEVED REMISSION (H): Primary | ICD-10-CM

## 2021-11-16 LAB
ALBUMIN SERPL-MCNC: 3.4 G/DL (ref 3.4–5)
ALP SERPL-CCNC: 128 U/L (ref 40–150)
ALT SERPL W P-5'-P-CCNC: 17 U/L (ref 0–50)
ANION GAP SERPL CALCULATED.3IONS-SCNC: 5 MMOL/L (ref 3–14)
AST SERPL W P-5'-P-CCNC: 13 U/L (ref 0–45)
BASOPHILS # BLD AUTO: 0 10E3/UL (ref 0–0.2)
BASOPHILS NFR BLD AUTO: 1 %
BILIRUB SERPL-MCNC: 0.4 MG/DL (ref 0.2–1.3)
BUN SERPL-MCNC: 18 MG/DL (ref 7–30)
CALCIUM SERPL-MCNC: 9.3 MG/DL (ref 8.5–10.1)
CHLORIDE BLD-SCNC: 107 MMOL/L (ref 94–109)
CO2 SERPL-SCNC: 25 MMOL/L (ref 20–32)
CREAT SERPL-MCNC: 0.73 MG/DL (ref 0.52–1.04)
EOSINOPHIL # BLD AUTO: 0.3 10E3/UL (ref 0–0.7)
EOSINOPHIL NFR BLD AUTO: 4 %
ERYTHROCYTE [DISTWIDTH] IN BLOOD BY AUTOMATED COUNT: 15.4 % (ref 10–15)
GFR SERPL CREATININE-BSD FRML MDRD: 82 ML/MIN/1.73M2
GLUCOSE BLD-MCNC: 106 MG/DL (ref 70–99)
HCT VFR BLD AUTO: 38.3 % (ref 35–47)
HGB BLD-MCNC: 12.8 G/DL (ref 11.7–15.7)
IMM GRANULOCYTES # BLD: 0.1 10E3/UL
IMM GRANULOCYTES NFR BLD: 1 %
LYMPHOCYTES # BLD AUTO: 1.1 10E3/UL (ref 0.8–5.3)
LYMPHOCYTES NFR BLD AUTO: 19 %
MCH RBC QN AUTO: 30.8 PG (ref 26.5–33)
MCHC RBC AUTO-ENTMCNC: 33.4 G/DL (ref 31.5–36.5)
MCV RBC AUTO: 92 FL (ref 78–100)
MONOCYTES # BLD AUTO: 0.7 10E3/UL (ref 0–1.3)
MONOCYTES NFR BLD AUTO: 11 %
NEUTROPHILS # BLD AUTO: 3.8 10E3/UL (ref 1.6–8.3)
NEUTROPHILS NFR BLD AUTO: 64 %
NRBC # BLD AUTO: 0 10E3/UL
NRBC BLD AUTO-RTO: 0 /100
PLATELET # BLD AUTO: 266 10E3/UL (ref 150–450)
POTASSIUM BLD-SCNC: 3.9 MMOL/L (ref 3.4–5.3)
PROT SERPL-MCNC: 6.7 G/DL (ref 6.8–8.8)
RBC # BLD AUTO: 4.16 10E6/UL (ref 3.8–5.2)
SODIUM SERPL-SCNC: 137 MMOL/L (ref 133–144)
WBC # BLD AUTO: 6 10E3/UL (ref 4–11)

## 2021-11-16 PROCEDURE — 96401 CHEMO ANTI-NEOPL SQ/IM: CPT

## 2021-11-16 PROCEDURE — 85004 AUTOMATED DIFF WBC COUNT: CPT | Mod: ZL | Performed by: INTERNAL MEDICINE

## 2021-11-16 PROCEDURE — 99214 OFFICE O/P EST MOD 30 MIN: CPT | Performed by: NURSE PRACTITIONER

## 2021-11-16 PROCEDURE — 36415 COLL VENOUS BLD VENIPUNCTURE: CPT | Mod: ZL | Performed by: INTERNAL MEDICINE

## 2021-11-16 PROCEDURE — 80053 COMPREHEN METABOLIC PANEL: CPT | Mod: ZL | Performed by: INTERNAL MEDICINE

## 2021-11-16 PROCEDURE — G0463 HOSPITAL OUTPT CLINIC VISIT: HCPCS

## 2021-11-16 PROCEDURE — 250N000013 HC RX MED GY IP 250 OP 250 PS 637: Performed by: INTERNAL MEDICINE

## 2021-11-16 PROCEDURE — 250N000011 HC RX IP 250 OP 636: Performed by: INTERNAL MEDICINE

## 2021-11-16 PROCEDURE — G0463 HOSPITAL OUTPT CLINIC VISIT: HCPCS | Mod: 25

## 2021-11-16 RX ORDER — DIPHENHYDRAMINE HCL 50 MG
50 CAPSULE ORAL ONCE
Status: COMPLETED | OUTPATIENT
Start: 2021-11-16 | End: 2021-11-16

## 2021-11-16 RX ORDER — ACETAMINOPHEN 325 MG/1
650 TABLET ORAL ONCE
Status: COMPLETED | OUTPATIENT
Start: 2021-11-16 | End: 2021-11-16

## 2021-11-16 RX ADMIN — DARATUMUMAB AND HYALURONIDASE-FIHJ (HUMAN RECOMBINANT) 1800 MG: 1800; 30000 INJECTION SUBCUTANEOUS at 12:55

## 2021-11-16 RX ADMIN — ACETAMINOPHEN 650 MG: 325 TABLET, FILM COATED ORAL at 12:04

## 2021-11-16 RX ADMIN — DIPHENHYDRAMINE HYDROCHLORIDE 50 MG: 50 CAPSULE ORAL at 12:04

## 2021-11-16 RX ADMIN — BORTEZOMIB 2.4 MG: 3.5 INJECTION, POWDER, LYOPHILIZED, FOR SOLUTION INTRAVENOUS; SUBCUTANEOUS at 12:46

## 2021-11-16 ASSESSMENT — PAIN SCALES - GENERAL: PAINLEVEL: NO PAIN (0)

## 2021-11-16 ASSESSMENT — MIFFLIN-ST. JEOR: SCORE: 1229.54

## 2021-11-16 NOTE — PATIENT INSTRUCTIONS
We would like to see you back per your schedule.     When you are in need of a refill, please call your pharmacy and they will send us a request.     If you have any questions please call [phone]    Other instructions: None

## 2021-11-16 NOTE — PROGRESS NOTES
Patient is 71 years old, here today accompanied by self for injection of FASPRO and Velcade per order of Dr Walker.     Component      Latest Ref Rng & Units 11/16/2021   WBC      4.0 - 11.0 10e3/uL 6.0   RBC Count      3.80 - 5.20 10e6/uL 4.16   Hemoglobin      11.7 - 15.7 g/dL 12.8   Hematocrit      35.0 - 47.0 % 38.3   MCV      78 - 100 fL 92   MCH      26.5 - 33.0 pg 30.8   MCHC      31.5 - 36.5 g/dL 33.4   RDW      10.0 - 15.0 % 15.4 (H)   Platelet Count      150 - 450 10e3/uL 266   % Neutrophils      % 64   % Lymphocytes      % 19   % Monocytes      % 11   % Eosinophils      % 4   % Basophils      % 1   % Immature Granulocytes      % 1   NRBCs per 100 WBC      <1 /100 0   Absolute Neutrophils      1.6 - 8.3 10e3/uL 3.8   Absolute Lymphocytes      0.8 - 5.3 10e3/uL 1.1   Absolute Monocytes      0.0 - 1.3 10e3/uL 0.7   Absolute Eosinophils      0.0 - 0.7 10e3/uL 0.3   Absolute Basophils      0.0 - 0.2 10e3/uL 0.0   Absolute Immature Granulocytes      <=0.0 10e3/uL 0.1 (H)   Absolute NRBCs      10e3/uL 0.0   Sodium      133 - 144 mmol/L 137   Potassium      3.4 - 5.3 mmol/L 3.9   Chloride      94 - 109 mmol/L 107   Carbon Dioxide      20 - 32 mmol/L 25   Anion Gap      3 - 14 mmol/L 5   Urea Nitrogen      7 - 30 mg/dL 18   Creatinine      0.52 - 1.04 mg/dL 0.73   Calcium      8.5 - 10.1 mg/dL 9.3   Glucose      70 - 99 mg/dL 106 (H)   Alkaline Phosphatase      40 - 150 U/L 128   AST      0 - 45 U/L 13   ALT      0 - 50 U/L 17   Protein Total      6.8 - 8.8 g/dL 6.7 (L)   Albumin      3.4 - 5.0 g/dL 3.4   Bilirubin Total      0.2 - 1.3 mg/dL 0.4   GFR Estimate      >60 mL/min/1.73m2 82         Vitals, Abuse and Fall risk screenings all done in [facility] prior this date.    Labs meet parameters for today's injection.     Patient denies questions nor concerns regarding medication, administration site, side effects, nor aftercare.  Patient identified with two identifiers, order verified, and verbal consent for today's  injection obtained from patient.   Patient education provided on s/s of injection site infection, and/or medication specific side effects, and when to call a provider.  Patient instructed to report any adverse effects.     Velcade administered per protocol in left upper outer arm at 45 degree angle.  Site covered with sterile bandage.  Patient tolerated injection well, no verbal nor non-verbal signs of discomfort noted.  No adverse effects noted at this time.     FASPRO administered per protocol in right lower quadrant at 45 degree angle.  Site covered with sterile bandage.  Patient tolerated injection well, no verbal nor non-verbal signs of discomfort noted.  No adverse effects noted at this time.     Patient instructed to call with further questions or concerns.  Patient states understanding and is in agreement with this plan. Patient discharged.

## 2021-11-16 NOTE — NURSING NOTE
"Oncology Rooming Note    November 16, 2021 11:02 AM   Carmelita Watts is a 71 year old female who presents for:    Chief Complaint   Patient presents with     Oncology Clinic Visit     Follow up Multiple myeloma not having achieved remission      Initial Vitals: /82   Pulse 90   Resp 16   Ht 1.598 m (5' 2.9\")   Wt 75.7 kg (166 lb 14.2 oz)   SpO2 95%   BMI 29.66 kg/m   Estimated body mass index is 29.66 kg/m  as calculated from the following:    Height as of this encounter: 1.598 m (5' 2.9\").    Weight as of this encounter: 75.7 kg (166 lb 14.2 oz). Body surface area is 1.83 meters squared.  No Pain (0) Comment: Data Unavailable   No LMP recorded. Patient is postmenopausal.  Allergies reviewed: Yes  Medications reviewed: Yes    Medications: Medication refills not needed today.  Pharmacy name entered into [facility]:    [id number] - [facility] [address]  AT [facility] [address]  [facility] - [place], [place] - [address]    Clinical concerns: No concerns       Christine Christian RN            "

## 2021-11-16 NOTE — PROGRESS NOTES
Oncology Follow-up Visit:  November 16, 2021    Reason for Visit:  Patient presents with:  Oncology Clinic Visit: Follow up Multiple myeloma not having achieved remission      Nursing Note and documentation reviewed: yes    HPI:  This is a 71-year-old female patient who presents to the oncology clinic today for evaluation prior to receiving cycle 16 day 1 therapy for Stage II-RISS IgG multiple myeloma diagnosed June 2019.  She progressed on Velcade and dexamethasone and CyBorD and treated was changed to Darzalex faspro injection. M-spike went from 1.0 to 1.2 in March so Velcade and dex were added to treatment plan.    She presents to the clinic today stating she overall is doing pretty well.  She does admit to some increased fatigue.  She also has some mild shortness of breath with exertion.  She tries to keep her self busy.    Oncologic History:     12/31/2018   patient presented to the emergency room with vertigo and fatigue.  CT scan of the head was negative and subsequent stress test was negative.  5/3/2019  She was seen by her PCP who ordered lab work and noted a total protein of 12.9.  SPEP at that time showed an M spike of 6.2 with a large monoclonal protein seen in the gamma fraction.  Urine immunofixation showed a possible small protein band in the gamma fraction  5/31/2019  she was evaluated by Dr. Walker with Medical Oncology with plan to rule out myeloma and obtain bone marrow aspiration biopsy as well as a metastatic bone survey along with additional labwork  6/18/2019 she underwent bone marrow aspiration and biopsy  6/24/2019  She was seen again by Dr. Walker and CBC showed a hemoglobin of 9.3, M spike 7.3 with monoclonal IgG immunoglobulin of kappa light chain type; serum viscosity was 2.9; quantitative immunoglobulins showed an IgG of 8160, beta-2 microglobulin was 5.8, BUN was 21 with creatinine is 0.8 and total protein was 13.7.  Quantitative kappa/lambda free light chains showed an elevated  ratio of 17.0 bone marrow aspiration biopsy showed plasma cell myeloma with approximately 80% plasma cells.  Immunofixation showed IgG kappa and flow cytometry revealed kappa monotypic plasma cells consistent with clonal plasma cell neoplasm and FISH panel was pending at that time.  It was felt she had at least stage II disease based on her beta-2 microglobulin and anemia.  Plan was to treat with Velcade 1.3 mg per metered squared days 1, 4, 8 and 11/Decadron 40 mg on days 1, 8 and 15 initiation of Revlimid with the second cycle at 25 mg daily days 1 through 14.  Plan was to also obtain an MRI of the lumbar spine to rule out lytic lesion at L3.  She was initiated on Zovirax 400 mg p.o. twice daily.  6/25/2019  1st cycle of chemotherapy initiated  7/1/2019 note in chart regarding patient's large co-pay for the Revlimid and no plan at this point to initiate Revlimid and treat only with Velcade and Decadron per Dr. Walker  7/11/2019  MRI lumbar spine shows a pathologic superior endplate compression fracture at L3 without evidence of retropulsed fragment and innumerable enhancing lesions throughout the lumbar spine consistent with history of multiple myeloma.  9/10/2019  Increasing M-spike and kappa lambda ratio  10/1/2019  Initiation of CyBorD  11/17/2021  Stabilization of M-spike at 1.2  12/1/2020  Initiation of Darzalex Faspro injection  3/23/2021  M-spike increased form 1.0 to 1.2 and velcade and dexamethasone added to plan     Current Chemo Regime/TX:  Darzalex faspro injection 1800mg subcutaneous per protocol with velcade 1.3mg/m2 and dexamethasone 20mg added to day 4, 8, 11 with cycle given every 21 days      **Zometa 4mg every 3 months  Current Cycle: 16 day 1 (cycle 5 added velcade dex)  # of completed cycles:  15     Previous treatment:   Velcade 1.3 mg/m2 days 1, 4, 8 and 11 with Decadron 40 mg days 1, 8 and 15 x 4 cycles;   Velcade 1.5mg.m2/cyclophosphamide 150mg every 7 days on days 1,8,15 and 22/decadron  "40mg days 1,8,15,22 ; Darzalex faspro injection 1800mg subcutaneous per protocol     Past Medical History:   Diagnosis Date     Arthritis      Depressive disorder      Diabetes mellitus, type 2 (H) 1/18/2021     Essential hypertension 10/1/2015     Major depressive disorder, recurrent episode, mild (H) 10/1/2015     Mixed hyperlipidemia 10/1/2015     Multiple myeloma not having achieved remission (H) 6/24/2019     Other specified disorders of bladder 07/09/2012    Irritable Bladder     Seasonal allergies 10/1/2015     Unspecified essential hypertension 03/19/2007     Unspecified sinusitis (chronic) 09/05/2007       Past Surgical History:   Procedure Laterality Date     APPENDECTOMY       BONE MARROW BIOPSY, BONE SPECIMEN, NEEDLE/TROCAR N/A 6/18/2019    Procedure: BONE MARROW BIOPSY;  Surgeon: Maciej Sanz MD;  Location: [facility]     CHOLECYSTECTOMY       COLONOSCOPY  07-    repeat 10 years     COLONOSCOPY N/A 12/30/2016    Procedure: COLONOSCOPY;  Surgeon: Bhaskar Franklin DO;  Location: [facility]     SINUS SURGERY       TUBAL STERILIZATION         Family History   Problem Relation Age of Onset     Breast Cancer Mother 66        Cause of Death     Parkinsonism Father         \"Possible\"     Coronary Artery Disease Father      Pacemaker Father      Thyroid Disease Daughter      Diabetes No family hx of      Hypertension No family hx of      Hyperlipidemia No family hx of      Cerebrovascular Disease No family hx of      Colon Cancer No family hx of      Prostate Cancer No family hx of      Genetic Disorder No family hx of      Asthma No family hx of      Anesthesia Reaction No family hx of        Social History     Socioeconomic History     Marital status:      Spouse name: Not on file     Number of children: Not on file     Years of education: Not on file     Highest education level: Not on file   Occupational History     Occupation: Financial     Comment:  - (FT)   Tobacco Use     " Smoking status: Never Smoker     Smokeless tobacco: Never Used     Tobacco comment: Tried to Quit (YES); QUIT in 1971; Passive Exposure (NO)   Substance and Sexual Activity     Alcohol use: Yes     Comment: RARELY     Drug use: No     Sexual activity: Yes     Partners: Male     Birth control/protection: None   Other Topics Concern      Service Not Asked     Blood Transfusions Not Asked     Caffeine Concern Yes     Comment: Coffee >6 cups daily     Occupational Exposure Not Asked     Hobby Hazards Not Asked     Sleep Concern Not Asked     Stress Concern Not Asked     Weight Concern Not Asked     Special Diet Not Asked     Back Care Not Asked     Exercise Not Asked     Bike Helmet Not Asked     Seat Belt Not Asked     Self-Exams Not Asked     Parent/sibling w/ CABG, MI or angioplasty before 65F 55M? No   Social History Narrative     Not on file     Social Determinants of Health     Financial Resource Strain: Not on file   Food Insecurity: Not on file   Transportation Needs: Not on file   Physical Activity: Not on file   Stress: Not on file   Social Connections: Not on file   Intimate Partner Violence: Not on file   Housing Stability: Not on file       Current Outpatient Medications   Medication     acyclovir (ZOVIRAX) 400 MG tablet     aspirin (ASA) 81 MG chewable tablet     atorvastatin (LIPITOR) 10 MG tablet     dexamethasone (DECADRON) 4 MG tablet     fluticasone (FLONASE) 50 MCG/ACT nasal spray     L-Lysine HCl 500 MG CAPS     losartan (COZAAR) 50 MG tablet     sertraline (ZOLOFT) 50 MG tablet     benzonatate (TESSALON) 200 MG capsule     EPINEPHrine (EPIPEN) 0.3 MG/0.3ML injection     prochlorperazine (COMPAZINE) 10 MG tablet     No current facility-administered medications for this visit.        Allergies   Allergen Reactions     Lisinopril Cough     Phenylephrine Hcl Other (See Comments)     **Entex  HEADACHE (SEVERE)     Phenylpropanolamine Other (See Comments)     **Entex  HEADACHE (SEVERE)      "Pseudoephedrine Tannate Other (See Comments)     **Entex  HEADACHE (SEVERE)     Levofloxacin Rash     **Levaquin     Moxifloxacin Hcl [Avelox] Rash       Review Of Systems:  Constitutional:    denies fever, weight changes, chills, and night sweats.  Eyes:    denies double vision; gets blurred vision  Ears/Nose/Throat:   denies ear pain, nose problems, difficulty swallowing  Respiratory:   denies cough   Skin:   denies rash, lesions  Cardiovascular:   denies chest pain, palpitations, edema  Gastrointestinal:   denies abdominal pain, bloating, nausea, early satiety; no change in bowel habits or blood in stool  Genitourinary:   denies difficulty with urination, blood in urine  Musculoskeletal:    denies new muscle pain, bone pain; some pain in the right hip at times  Neurologic:   denies lightheadedness, headaches, numbness or tingling  Psychiatric:   denies anxiety, depression  Hematologic/Lymphatic/Immunologic:   denies easy bruising, easy bleeding, lumps or bumps noted  Endocrine:   Denies increased thirst    Fatigue (0=no fatigue; 10=worst fatigue imaginable): 3    Pain  (0=no pain; 10=worst pain imaginable): 0    ECOG Performance Status: 1    Physical Exam:  /82   Pulse 90   Temp 98.3  F (36.8  C) (Tympanic)   Resp 16   Ht 1.598 m (5' 2.9\")   Wt 75.7 kg (166 lb 14.2 oz)   SpO2 95%   BMI 29.66 kg/m      GENERAL APPEARANCE: Healthy, alert and in no acute distress.  HEENT: Normocephalic, Sclerae anicteric.   NECK:   No asymmetry or masses, no thyromegaly.  LYMPHATICS: No palpable cervical, supraclavicular, axillary, or inguinal nodes   RESP: Lungs clear to auscultation bilaterally, respirations regular and easy  CARDIOVASCULAR: Regular rate and rhythm. Normal S1, S2; no murmur, gallop, or rub.  ABDOMEN: Soft, nontender. Bowel sounds auscultated all 4 quadrants. No palpable organomegaly or masses.  MUSCULOSKELETAL: Extremities without gross deformities noted. No edema of bilateral lower " extremities.  NEURO: Alert and oriented x 3.  Gait steady.  PSYCHIATRIC: Mentation and affect appear normal.  Mood appropriate.    Laboratory:  Results for orders placed or performed in visit on 11/16/21   Comprehensive metabolic panel     Status: Abnormal   Result Value Ref Range    Sodium 137 133 - 144 mmol/L    Potassium 3.9 3.4 - 5.3 mmol/L    Chloride 107 94 - 109 mmol/L    Carbon Dioxide (CO2) 25 20 - 32 mmol/L    Anion Gap 5 3 - 14 mmol/L    Urea Nitrogen 18 7 - 30 mg/dL    Creatinine 0.73 0.52 - 1.04 mg/dL    Calcium 9.3 8.5 - 10.1 mg/dL    Glucose 106 (H) 70 - 99 mg/dL    Alkaline Phosphatase 128 40 - 150 U/L    AST 13 0 - 45 U/L    ALT 17 0 - 50 U/L    Protein Total 6.7 (L) 6.8 - 8.8 g/dL    Albumin 3.4 3.4 - 5.0 g/dL    Bilirubin Total 0.4 0.2 - 1.3 mg/dL    GFR Estimate 82 >60 mL/min/1.73m2   CBC with platelets and differential     Status: Abnormal   Result Value Ref Range    WBC Count 6.0 4.0 - 11.0 10e3/uL    RBC Count 4.16 3.80 - 5.20 10e6/uL    Hemoglobin 12.8 11.7 - 15.7 g/dL    Hematocrit 38.3 35.0 - 47.0 %    MCV 92 78 - 100 fL    MCH 30.8 26.5 - 33.0 pg    MCHC 33.4 31.5 - 36.5 g/dL    RDW 15.4 (H) 10.0 - 15.0 %    Platelet Count 266 150 - 450 10e3/uL    % Neutrophils 64 %    % Lymphocytes 19 %    % Monocytes 11 %    % Eosinophils 4 %    % Basophils 1 %    % Immature Granulocytes 1 %    NRBCs per 100 WBC 0 <1 /100    Absolute Neutrophils 3.8 1.6 - 8.3 10e3/uL    Absolute Lymphocytes 1.1 0.8 - 5.3 10e3/uL    Absolute Monocytes 0.7 0.0 - 1.3 10e3/uL    Absolute Eosinophils 0.3 0.0 - 0.7 10e3/uL    Absolute Basophils 0.0 0.0 - 0.2 10e3/uL    Absolute Immature Granulocytes 0.1 (H) <=0.0 10e3/uL    Absolute NRBCs 0.0 10e3/uL   CBC with platelets differential     Status: Abnormal    Narrative    The following orders were created for panel order CBC with platelets differential.  Procedure                               Abnormality         Status                     ---------                                -----------         ------                     CBC with platelets and d...[[id number]]  Abnormal            Final result                 Please view results for these tests on the individual orders.     Component      Latest Ref Rng & Units 11/9/2021   Albumin Fraction      3.7 - 5.1 g/dL 4.4   Alpha 1 Fraction      0.2 - 0.4 g/dL 0.4   Alpha 2 Fraction      0.5 - 0.9 g/dL 0.8   Beta Fraction      0.6 - 1.0 g/dL 0.8   Gamma Fraction      0.7 - 1.6 g/dL 0.9   Monoclonal Peak      <=0.0 g/dL 0.2 (H)   ELP Interpretation:       Small monoclonal protein (about 0.2 g/dL) seen in the gamma fraction. See immunofixation report on same specimen. Pathologic significance requires clinical correlation. BIA Izaguirre M.D., Ph.D., Pathologist ().   IGG      610-1,616 mg/dL 1,005   IGA      84 - 499 mg/dL 13 (L)   IGM      35 - 242 mg/dL 21 (L)   Kappa Free Light Chains      0.33 - 1.94 mg/dL 0.47   LAMBDA FREE LT CHAINS      0.57 - 2.63 mg/dL 0.22 (L)   KAPPA/LAMBDA RATIO      0.26 - 1.65 2.14 (H)   Beta-2-Microglobulin      <2.3 mg/L 2.7 (H)   Viscosity Index      1.4 - 1.8 1.5   Immunofixation ELP       Small monoclonal IgG immunoglobulin of kappa light chain type. Monoclonal antibody therapeutics (e.g. Daratumumab) may appear as monoclonal proteins on serum electrophoresis and immunofixation. Results should be interpreted with caution if the patient is receiving monoclonal antibody therapy. Pathological significance requires clinical correlation. BIA Izaguirre M.D., Ph.D., Patholgist ([phone])   Total Protein Serum for ELP      6.8 - 8.8 g/dL 7.3     Imaging Studies:  None for today's visit      ASSESSMENT/PLAN:    #1 Multiple myeloma:   IgG kappa multiple myeloma with 80% involvement of the bone marrow diagnosed June 2019 initially treated with Velcade and Decadron and received 4 cycles with increasing M-spike and kappa/lambda ratio.  Treatment changed to CyBorD on 10/1/2019.  M-spike plateau at 1.2 and  treatment changed to monotherapy with Darzalex Faspro injection.     M spike declined to 1.0 then increased again to 1.2 so Velcade and Dex added to her treatment plan with cycle 5 therapy.  M-spike decreased further to 0.2.   Will initiate cycle 16 and patient will follow up prior to cycle 17 with a CBC, CMP, SPEP, quantitative immunoglobulins, protein immunofixation and kappa lambda ratio.      #2 lytic lesion: She will continue with the Zometa every 3 months. She remains on calcium with vitamin D twice a day and dental exams at least every 6 months.      I encouraged patient to call with any questions or concerns.    30 minutes spent in the patient's encounter today with time spent in review of the chart along with in chart preparation.  Time was also spent in review of her treatment plan.  Time was also spent obtaining a review of systems and performing a physical exam along with detailed review of all lab work with the patient.  Time was also spent in chart documentation.    Barbie Quintana NP  APRN, FNP-BC, AOCNP

## 2021-11-17 ENCOUNTER — OFFICE VISIT (OUTPATIENT)
Dept: FAMILY MEDICINE | Facility: OTHER | Age: 71
End: 2021-11-17
Attending: PHYSICIAN ASSISTANT
Payer: COMMERCIAL

## 2021-11-17 VITALS
BODY MASS INDEX: 29.41 KG/M2 | DIASTOLIC BLOOD PRESSURE: 80 MMHG | TEMPERATURE: 98.8 F | HEART RATE: 89 BPM | HEIGHT: 63 IN | WEIGHT: 166 LBS | OXYGEN SATURATION: 97 % | SYSTOLIC BLOOD PRESSURE: 110 MMHG

## 2021-11-17 DIAGNOSIS — E78.5 HYPERLIPIDEMIA LDL GOAL <130: ICD-10-CM

## 2021-11-17 DIAGNOSIS — E11.00 TYPE 2 DIABETES MELLITUS WITH HYPEROSMOLARITY WITHOUT COMA, WITHOUT LONG-TERM CURRENT USE OF INSULIN (H): ICD-10-CM

## 2021-11-17 DIAGNOSIS — I10 HYPERTENSION GOAL BP (BLOOD PRESSURE) < 140/80: Primary | ICD-10-CM

## 2021-11-17 LAB
EST. AVERAGE GLUCOSE BLD GHB EST-MCNC: 131 MG/DL
HBA1C MFR BLD: 6.2 % (ref 0–5.6)

## 2021-11-17 PROCEDURE — 36415 COLL VENOUS BLD VENIPUNCTURE: CPT | Mod: ZL | Performed by: PHYSICIAN ASSISTANT

## 2021-11-17 PROCEDURE — 99214 OFFICE O/P EST MOD 30 MIN: CPT | Performed by: PHYSICIAN ASSISTANT

## 2021-11-17 PROCEDURE — G0463 HOSPITAL OUTPT CLINIC VISIT: HCPCS

## 2021-11-17 PROCEDURE — 83036 HEMOGLOBIN GLYCOSYLATED A1C: CPT | Mod: ZL | Performed by: PHYSICIAN ASSISTANT

## 2021-11-17 ASSESSMENT — ANXIETY QUESTIONNAIRES
1. FEELING NERVOUS, ANXIOUS, OR ON EDGE: NOT AT ALL
6. BECOMING EASILY ANNOYED OR IRRITABLE: NOT AT ALL
2. NOT BEING ABLE TO STOP OR CONTROL WORRYING: NOT AT ALL
5. BEING SO RESTLESS THAT IT IS HARD TO SIT STILL: NOT AT ALL
GAD7 TOTAL SCORE: 0
7. FEELING AFRAID AS IF SOMETHING AWFUL MIGHT HAPPEN: NOT AT ALL
4. TROUBLE RELAXING: NOT AT ALL
3. WORRYING TOO MUCH ABOUT DIFFERENT THINGS: NOT AT ALL

## 2021-11-17 ASSESSMENT — MIFFLIN-ST. JEOR: SCORE: 1225.51

## 2021-11-17 ASSESSMENT — PAIN SCALES - GENERAL: PAINLEVEL: NO PAIN (0)

## 2021-11-17 NOTE — NURSING NOTE
"Chief Complaint   Patient presents with     Diabetes     Lipids     Hypertension     MOOD CHANGES       Initial /80 (BP Location: Right arm, Patient Position: Sitting, Cuff Size: Adult Regular)   Pulse 89   Temp 98.8  F (37.1  C) (Tympanic)   Ht 1.598 m (5' 2.9\")   Wt 75.3 kg (166 lb)   SpO2 97%   BMI 29.50 kg/m   Estimated body mass index is 29.5 kg/m  as calculated from the following:    Height as of this encounter: 1.598 m (5' 2.9\").    Weight as of this encounter: 75.3 kg (166 lb).  Medication Reconciliation: complete  Marlyn Loo LPN  "

## 2021-11-18 ASSESSMENT — PATIENT HEALTH QUESTIONNAIRE - PHQ9: SUM OF ALL RESPONSES TO PHQ QUESTIONS 1-9: 0

## 2021-11-18 ASSESSMENT — ANXIETY QUESTIONNAIRES: GAD7 TOTAL SCORE: 0

## 2021-11-19 ENCOUNTER — INFUSION THERAPY VISIT (OUTPATIENT)
Dept: INFUSION THERAPY | Facility: OTHER | Age: 71
End: 2021-11-19
Attending: INTERNAL MEDICINE
Payer: MEDICARE

## 2021-11-19 VITALS
HEIGHT: 63 IN | DIASTOLIC BLOOD PRESSURE: 88 MMHG | BODY MASS INDEX: 29.57 KG/M2 | HEART RATE: 83 BPM | RESPIRATION RATE: 18 BRPM | OXYGEN SATURATION: 100 % | SYSTOLIC BLOOD PRESSURE: 119 MMHG | TEMPERATURE: 98.1 F | WEIGHT: 166.89 LBS

## 2021-11-19 DIAGNOSIS — C90.00 MULTIPLE MYELOMA NOT HAVING ACHIEVED REMISSION (H): Primary | ICD-10-CM

## 2021-11-19 PROCEDURE — 250N000011 HC RX IP 250 OP 636: Mod: JW | Performed by: INTERNAL MEDICINE

## 2021-11-19 PROCEDURE — 96401 CHEMO ANTI-NEOPL SQ/IM: CPT

## 2021-11-19 RX ADMIN — BORTEZOMIB 2.4 MG: 3.5 INJECTION, POWDER, LYOPHILIZED, FOR SOLUTION INTRAVENOUS; SUBCUTANEOUS at 13:03

## 2021-11-19 ASSESSMENT — MIFFLIN-ST. JEOR: SCORE: 1229.74

## 2021-11-19 ASSESSMENT — PAIN SCALES - GENERAL: PAINLEVEL: NO PAIN (0)

## 2021-11-19 NOTE — PROGRESS NOTES
Patient is 71 years old, here today for injection of Velcade per order of . Patient meets parameters for today's infusion. Patient identified with two identifiers, order verified, and verbal consent for today's infusion obtained from patient.     Patient denies questions or concerns regarding injection and/or medication(s) being administered.    Independent dose check completed with UZMA King.    Velcade injected SQ into right upper outer arm at a 45 degree angle per protocol rotating sites. Patient tolerated injection without incident, no signs or symptoms of adverse reaction noted. Patient denies pain or discomfort.     Covered with a sterile bandage. Pt instructed to leave bandage intact for a minimum of one hour, and to call with questions or concerns. Patient states understanding, discharged.

## 2021-11-23 ENCOUNTER — INFUSION THERAPY VISIT (OUTPATIENT)
Dept: INFUSION THERAPY | Facility: OTHER | Age: 71
End: 2021-11-23
Attending: INTERNAL MEDICINE
Payer: MEDICARE

## 2021-11-23 VITALS
BODY MASS INDEX: 29.49 KG/M2 | DIASTOLIC BLOOD PRESSURE: 60 MMHG | HEART RATE: 77 BPM | OXYGEN SATURATION: 98 % | WEIGHT: 166.45 LBS | SYSTOLIC BLOOD PRESSURE: 120 MMHG | RESPIRATION RATE: 18 BRPM | TEMPERATURE: 98.1 F | HEIGHT: 63 IN

## 2021-11-23 DIAGNOSIS — C90.00 MULTIPLE MYELOMA NOT HAVING ACHIEVED REMISSION (H): Primary | ICD-10-CM

## 2021-11-23 LAB
BASOPHILS # BLD AUTO: 0 10E3/UL (ref 0–0.2)
BASOPHILS NFR BLD AUTO: 0 %
EOSINOPHIL # BLD AUTO: 0.3 10E3/UL (ref 0–0.7)
EOSINOPHIL NFR BLD AUTO: 6 %
ERYTHROCYTE [DISTWIDTH] IN BLOOD BY AUTOMATED COUNT: 15.3 % (ref 10–15)
HCT VFR BLD AUTO: 39.1 % (ref 35–47)
HGB BLD-MCNC: 12.7 G/DL (ref 11.7–15.7)
IMM GRANULOCYTES # BLD: 0.1 10E3/UL
IMM GRANULOCYTES NFR BLD: 2 %
LYMPHOCYTES # BLD AUTO: 1.3 10E3/UL (ref 0.8–5.3)
LYMPHOCYTES NFR BLD AUTO: 25 %
MCH RBC QN AUTO: 30.2 PG (ref 26.5–33)
MCHC RBC AUTO-ENTMCNC: 32.5 G/DL (ref 31.5–36.5)
MCV RBC AUTO: 93 FL (ref 78–100)
MONOCYTES # BLD AUTO: 0.8 10E3/UL (ref 0–1.3)
MONOCYTES NFR BLD AUTO: 16 %
NEUTROPHILS # BLD AUTO: 2.7 10E3/UL (ref 1.6–8.3)
NEUTROPHILS NFR BLD AUTO: 51 %
NRBC # BLD AUTO: 0 10E3/UL
NRBC BLD AUTO-RTO: 1 /100
PLATELET # BLD AUTO: 189 10E3/UL (ref 150–450)
RBC # BLD AUTO: 4.21 10E6/UL (ref 3.8–5.2)
WBC # BLD AUTO: 5.3 10E3/UL (ref 4–11)

## 2021-11-23 PROCEDURE — 36415 COLL VENOUS BLD VENIPUNCTURE: CPT | Mod: ZL | Performed by: INTERNAL MEDICINE

## 2021-11-23 PROCEDURE — 85025 COMPLETE CBC W/AUTO DIFF WBC: CPT | Mod: ZL | Performed by: INTERNAL MEDICINE

## 2021-11-23 PROCEDURE — 96401 CHEMO ANTI-NEOPL SQ/IM: CPT

## 2021-11-23 PROCEDURE — 250N000011 HC RX IP 250 OP 636: Performed by: INTERNAL MEDICINE

## 2021-11-23 RX ADMIN — BORTEZOMIB 2.4 MG: 3.5 INJECTION, POWDER, LYOPHILIZED, FOR SOLUTION INTRAVENOUS; SUBCUTANEOUS at 14:56

## 2021-11-23 ASSESSMENT — MIFFLIN-ST. JEOR: SCORE: 1227.74

## 2021-11-23 ASSESSMENT — PAIN SCALES - GENERAL: PAINLEVEL: NO PAIN (0)

## 2021-11-23 NOTE — PROGRESS NOTES
Patient is 71 years old, here today for injection of Velcade per order of . Patient meets parameters for today's infusion. Patient identified with two identifiers, order verified, and verbal consent for today's infusion obtained from patient. Written consent for treatment is on file and valid.    Component      Latest Ref Rng & Units 11/23/2021   WBC      4.0 - 11.0 10e3/uL 5.3   RBC Count      3.80 - 5.20 10e6/uL 4.21   Hemoglobin      11.7 - 15.7 g/dL 12.7   Hematocrit      35.0 - 47.0 % 39.1   MCV      78 - 100 fL 93   MCH      26.5 - 33.0 pg 30.2   MCHC      31.5 - 36.5 g/dL 32.5   RDW      10.0 - 15.0 % 15.3 (H)   Platelet Count      150 - 450 10e3/uL 189   % Neutrophils      % 51   % Lymphocytes      % 25   % Monocytes      % 16   % Eosinophils      % 6   % Basophils      % 0   % Immature Granulocytes      % 2   NRBCs per 100 WBC      <1 /100 1 (H)   Absolute Neutrophils      1.6 - 8.3 10e3/uL 2.7   Absolute Lymphocytes      0.8 - 5.3 10e3/uL 1.3   Absolute Monocytes      0.0 - 1.3 10e3/uL 0.8   Absolute Eosinophils      0.0 - 0.7 10e3/uL 0.3   Absolute Basophils      0.0 - 0.2 10e3/uL 0.0   Absolute Immature Granulocytes      <=0.0 10e3/uL 0.1 (H)   Absolute NRBCs      10e3/uL 0.0       Patient meets order parameters for today's treatment.    Patient denies questions or concerns regarding injection and/or medication(s) being administered.    Independent dose check completed with UZMA Mart.    Velcade injected SQ into left upper outer arm at a 45 degree per protocol rotating sites. Patient tolerated injection without incident, no signs or symptoms of adverse reaction noted. Patient denies pain or discomfort.     Covered with a sterile bandage. Pt instructed to leave bandage intact for a minimum of one hour, and to call with questions or concerns. Patient states understanding, discharged.

## 2021-11-26 ENCOUNTER — INFUSION THERAPY VISIT (OUTPATIENT)
Dept: INFUSION THERAPY | Facility: OTHER | Age: 71
End: 2021-11-26
Attending: INTERNAL MEDICINE
Payer: MEDICARE

## 2021-11-26 VITALS
DIASTOLIC BLOOD PRESSURE: 91 MMHG | RESPIRATION RATE: 17 BRPM | BODY MASS INDEX: 29.61 KG/M2 | HEART RATE: 68 BPM | SYSTOLIC BLOOD PRESSURE: 142 MMHG | WEIGHT: 166.67 LBS | OXYGEN SATURATION: 95 % | TEMPERATURE: 98.3 F

## 2021-11-26 DIAGNOSIS — C90.00 MULTIPLE MYELOMA NOT HAVING ACHIEVED REMISSION (H): ICD-10-CM

## 2021-11-26 DIAGNOSIS — C90.00 MULTIPLE MYELOMA NOT HAVING ACHIEVED REMISSION (H): Primary | ICD-10-CM

## 2021-11-26 LAB
BASOPHILS # BLD AUTO: 0 10E3/UL (ref 0–0.2)
BASOPHILS NFR BLD AUTO: 0 %
EOSINOPHIL # BLD AUTO: 0.4 10E3/UL (ref 0–0.7)
EOSINOPHIL NFR BLD AUTO: 9 %
ERYTHROCYTE [DISTWIDTH] IN BLOOD BY AUTOMATED COUNT: 15.1 % (ref 10–15)
HCT VFR BLD AUTO: 38.7 % (ref 35–47)
HGB BLD-MCNC: 13.1 G/DL (ref 11.7–15.7)
IMM GRANULOCYTES # BLD: 0.1 10E3/UL
IMM GRANULOCYTES NFR BLD: 2 %
LYMPHOCYTES # BLD AUTO: 1.2 10E3/UL (ref 0.8–5.3)
LYMPHOCYTES NFR BLD AUTO: 25 %
MCH RBC QN AUTO: 30.8 PG (ref 26.5–33)
MCHC RBC AUTO-ENTMCNC: 33.9 G/DL (ref 31.5–36.5)
MCV RBC AUTO: 91 FL (ref 78–100)
MONOCYTES # BLD AUTO: 1 10E3/UL (ref 0–1.3)
MONOCYTES NFR BLD AUTO: 22 %
NEUTROPHILS # BLD AUTO: 1.9 10E3/UL (ref 1.6–8.3)
NEUTROPHILS NFR BLD AUTO: 42 %
NRBC # BLD AUTO: 0 10E3/UL
NRBC BLD AUTO-RTO: 0 /100
PLATELET # BLD AUTO: 149 10E3/UL (ref 150–450)
RBC # BLD AUTO: 4.26 10E6/UL (ref 3.8–5.2)
WBC # BLD AUTO: 4.6 10E3/UL (ref 4–11)

## 2021-11-26 PROCEDURE — 85025 COMPLETE CBC W/AUTO DIFF WBC: CPT | Mod: ZL

## 2021-11-26 PROCEDURE — 250N000011 HC RX IP 250 OP 636: Performed by: INTERNAL MEDICINE

## 2021-11-26 PROCEDURE — 96401 CHEMO ANTI-NEOPL SQ/IM: CPT

## 2021-11-26 PROCEDURE — 36415 COLL VENOUS BLD VENIPUNCTURE: CPT | Mod: ZL

## 2021-11-26 RX ADMIN — BORTEZOMIB 2.4 MG: 3.5 INJECTION, POWDER, LYOPHILIZED, FOR SOLUTION INTRAVENOUS; SUBCUTANEOUS at 13:35

## 2021-11-26 ASSESSMENT — PAIN SCALES - GENERAL: PAINLEVEL: NO PAIN (0)

## 2021-11-26 NOTE — PROGRESS NOTES
Patient is a 71 year old female her today for injection of Velcade per order of . Patient identified with two identifiers, order verified, and verbal consent for today's infusion obtained from patient. Written consent for treatment is on file and valid.    Recent lab values: CBC with in normal limits Velcade dose verified with , RN, prior to release of drug.      Patient denies questions or concerns regarding injection and/or medication(s) being administered.    Velcade injected SQ into right arm at 45 degree angle per protocol rotating sites. Patient tolerated injection without incident, no signs or symptoms of adverse reaction noted. Patient denies pain or discomfort.     Covered with a sterile bandage. Pt instructed to leave bandage intact for a minimum of one hour, and to call with questions or concerns. Copy of appointments, discharge instructions, and after visit summary (AVS) provided to patient. Patient states understanding, discharged.

## 2021-11-29 RX ORDER — ACYCLOVIR 400 MG/1
TABLET ORAL
Qty: 60 TABLET | Refills: 3 | Status: SHIPPED | OUTPATIENT
Start: 2021-11-29 | End: 2022-02-17

## 2021-11-30 ENCOUNTER — LAB (OUTPATIENT)
Dept: LAB | Facility: OTHER | Age: 71
End: 2021-11-30
Attending: FAMILY MEDICINE
Payer: MEDICARE

## 2021-11-30 DIAGNOSIS — C90.00 MULTIPLE MYELOMA NOT HAVING ACHIEVED REMISSION (H): Primary | ICD-10-CM

## 2021-11-30 PROCEDURE — 86334 IMMUNOFIX E-PHORESIS SERUM: CPT | Mod: ZL

## 2021-11-30 PROCEDURE — 84165 PROTEIN E-PHORESIS SERUM: CPT | Mod: ZL | Performed by: PATHOLOGY

## 2021-11-30 PROCEDURE — 83883 ASSAY NEPHELOMETRY NOT SPEC: CPT | Mod: ZL

## 2021-11-30 PROCEDURE — 85810 BLOOD VISCOSITY EXAMINATION: CPT | Mod: ZL

## 2021-11-30 PROCEDURE — 82784 ASSAY IGA/IGD/IGG/IGM EACH: CPT | Mod: ZL

## 2021-11-30 PROCEDURE — 82232 ASSAY OF BETA-2 PROTEIN: CPT | Mod: ZL

## 2021-11-30 PROCEDURE — 84155 ASSAY OF PROTEIN SERUM: CPT | Mod: ZL

## 2021-11-30 PROCEDURE — 36415 COLL VENOUS BLD VENIPUNCTURE: CPT | Mod: ZL

## 2021-12-01 LAB
ALBUMIN SERPL ELPH-MCNC: 4 G/DL (ref 3.7–5.1)
ALPHA1 GLOB SERPL ELPH-MCNC: 0.4 G/DL (ref 0.2–0.4)
ALPHA2 GLOB SERPL ELPH-MCNC: 0.7 G/DL (ref 0.5–0.9)
B-GLOBULIN SERPL ELPH-MCNC: 0.7 G/DL (ref 0.6–1)
B2 MICROGLOB TUMOR MARKER SER-MCNC: 3.3 MG/L
GAMMA GLOB SERPL ELPH-MCNC: 0.8 G/DL (ref 0.7–1.6)
IGA SERPL-MCNC: 11 MG/DL (ref 84–499)
IGG SERPL-MCNC: 817 MG/DL (ref 610–1616)
IGM SERPL-MCNC: 15 MG/DL (ref 35–242)
KAPPA LC FREE SER-MCNC: 0.58 MG/DL (ref 0.33–1.94)
KAPPA LC FREE/LAMBDA FREE SER NEPH: 2.15 {RATIO} (ref 0.26–1.65)
LAMBDA LC FREE SERPL-MCNC: 0.27 MG/DL (ref 0.57–2.63)
M PROTEIN SERPL ELPH-MCNC: 0.3 G/DL
PROT PATTERN SERPL ELPH-IMP: ABNORMAL
PROT PATTERN SERPL IFE-IMP: NORMAL
TOTAL PROTEIN SERUM FOR ELP: 6.6 G/DL (ref 6.8–8.8)
VISC SER: 1.6 CP (ref 1.4–1.8)

## 2021-12-01 PROCEDURE — 86334 IMMUNOFIX E-PHORESIS SERUM: CPT | Mod: 26 | Performed by: PATHOLOGY

## 2021-12-01 PROCEDURE — 84165 PROTEIN E-PHORESIS SERUM: CPT | Mod: 26 | Performed by: PATHOLOGY

## 2021-12-07 ENCOUNTER — INFUSION THERAPY VISIT (OUTPATIENT)
Dept: INFUSION THERAPY | Facility: OTHER | Age: 71
End: 2021-12-07
Attending: INTERNAL MEDICINE
Payer: MEDICARE

## 2021-12-07 ENCOUNTER — ONCOLOGY VISIT (OUTPATIENT)
Dept: ONCOLOGY | Facility: OTHER | Age: 71
End: 2021-12-07
Attending: NURSE PRACTITIONER
Payer: MEDICARE

## 2021-12-07 ENCOUNTER — APPOINTMENT (OUTPATIENT)
Dept: LAB | Facility: OTHER | Age: 71
End: 2021-12-07
Attending: INTERNAL MEDICINE
Payer: MEDICARE

## 2021-12-07 VITALS
RESPIRATION RATE: 20 BRPM | TEMPERATURE: 98 F | DIASTOLIC BLOOD PRESSURE: 60 MMHG | HEART RATE: 92 BPM | OXYGEN SATURATION: 97 % | WEIGHT: 165.12 LBS | BODY MASS INDEX: 29.26 KG/M2 | HEIGHT: 63 IN | SYSTOLIC BLOOD PRESSURE: 100 MMHG

## 2021-12-07 DIAGNOSIS — C90.00 MULTIPLE MYELOMA NOT HAVING ACHIEVED REMISSION (H): Primary | ICD-10-CM

## 2021-12-07 LAB
ALBUMIN SERPL-MCNC: 3.6 G/DL (ref 3.4–5)
ALP SERPL-CCNC: 110 U/L (ref 40–150)
ALT SERPL W P-5'-P-CCNC: 18 U/L (ref 0–50)
ANION GAP SERPL CALCULATED.3IONS-SCNC: 4 MMOL/L (ref 3–14)
AST SERPL W P-5'-P-CCNC: 12 U/L (ref 0–45)
BASOPHILS # BLD AUTO: 0 10E3/UL (ref 0–0.2)
BASOPHILS NFR BLD AUTO: 1 %
BILIRUB SERPL-MCNC: 0.4 MG/DL (ref 0.2–1.3)
BUN SERPL-MCNC: 18 MG/DL (ref 7–30)
CALCIUM SERPL-MCNC: 9.3 MG/DL (ref 8.5–10.1)
CHLORIDE BLD-SCNC: 107 MMOL/L (ref 94–109)
CO2 SERPL-SCNC: 28 MMOL/L (ref 20–32)
CREAT SERPL-MCNC: 0.7 MG/DL (ref 0.52–1.04)
EOSINOPHIL # BLD AUTO: 0.6 10E3/UL (ref 0–0.7)
EOSINOPHIL NFR BLD AUTO: 10 %
ERYTHROCYTE [DISTWIDTH] IN BLOOD BY AUTOMATED COUNT: 15.7 % (ref 10–15)
GFR SERPL CREATININE-BSD FRML MDRD: 86 ML/MIN/1.73M2
GLUCOSE BLD-MCNC: 84 MG/DL (ref 70–99)
HCT VFR BLD AUTO: 39.7 % (ref 35–47)
HGB BLD-MCNC: 13.3 G/DL (ref 11.7–15.7)
IMM GRANULOCYTES # BLD: 0.1 10E3/UL
IMM GRANULOCYTES NFR BLD: 1 %
LYMPHOCYTES # BLD AUTO: 1.3 10E3/UL (ref 0.8–5.3)
LYMPHOCYTES NFR BLD AUTO: 23 %
MCH RBC QN AUTO: 30.9 PG (ref 26.5–33)
MCHC RBC AUTO-ENTMCNC: 33.5 G/DL (ref 31.5–36.5)
MCV RBC AUTO: 92 FL (ref 78–100)
MONOCYTES # BLD AUTO: 0.7 10E3/UL (ref 0–1.3)
MONOCYTES NFR BLD AUTO: 12 %
NEUTROPHILS # BLD AUTO: 2.8 10E3/UL (ref 1.6–8.3)
NEUTROPHILS NFR BLD AUTO: 53 %
NRBC # BLD AUTO: 0 10E3/UL
NRBC BLD AUTO-RTO: 0 /100
PLATELET # BLD AUTO: 283 10E3/UL (ref 150–450)
POTASSIUM BLD-SCNC: 3.8 MMOL/L (ref 3.4–5.3)
PROT SERPL-MCNC: 7.1 G/DL (ref 6.8–8.8)
RBC # BLD AUTO: 4.31 10E6/UL (ref 3.8–5.2)
SODIUM SERPL-SCNC: 139 MMOL/L (ref 133–144)
WBC # BLD AUTO: 5.4 10E3/UL (ref 4–11)

## 2021-12-07 PROCEDURE — G0463 HOSPITAL OUTPT CLINIC VISIT: HCPCS | Mod: 25

## 2021-12-07 PROCEDURE — 36415 COLL VENOUS BLD VENIPUNCTURE: CPT | Mod: ZL | Performed by: NURSE PRACTITIONER

## 2021-12-07 PROCEDURE — G0463 HOSPITAL OUTPT CLINIC VISIT: HCPCS

## 2021-12-07 PROCEDURE — 96401 CHEMO ANTI-NEOPL SQ/IM: CPT

## 2021-12-07 PROCEDURE — 99213 OFFICE O/P EST LOW 20 MIN: CPT | Performed by: NURSE PRACTITIONER

## 2021-12-07 PROCEDURE — 85025 COMPLETE CBC W/AUTO DIFF WBC: CPT | Mod: ZL | Performed by: NURSE PRACTITIONER

## 2021-12-07 PROCEDURE — 250N000011 HC RX IP 250 OP 636: Performed by: NURSE PRACTITIONER

## 2021-12-07 PROCEDURE — 82040 ASSAY OF SERUM ALBUMIN: CPT | Mod: ZL | Performed by: NURSE PRACTITIONER

## 2021-12-07 PROCEDURE — 250N000013 HC RX MED GY IP 250 OP 250 PS 637: Performed by: INTERNAL MEDICINE

## 2021-12-07 RX ORDER — NALOXONE HYDROCHLORIDE 0.4 MG/ML
.1-.4 INJECTION, SOLUTION INTRAMUSCULAR; INTRAVENOUS; SUBCUTANEOUS
Status: CANCELLED | OUTPATIENT
Start: 2021-12-17

## 2021-12-07 RX ORDER — MEPERIDINE HYDROCHLORIDE 25 MG/ML
25 INJECTION INTRAMUSCULAR; INTRAVENOUS; SUBCUTANEOUS EVERY 30 MIN PRN
Status: CANCELLED | OUTPATIENT
Start: 2021-12-07

## 2021-12-07 RX ORDER — MEPERIDINE HYDROCHLORIDE 25 MG/ML
25 INJECTION INTRAMUSCULAR; INTRAVENOUS; SUBCUTANEOUS EVERY 30 MIN PRN
Status: CANCELLED | OUTPATIENT
Start: 2021-12-14

## 2021-12-07 RX ORDER — ALBUTEROL SULFATE 0.83 MG/ML
2.5 SOLUTION RESPIRATORY (INHALATION)
Status: CANCELLED | OUTPATIENT
Start: 2021-12-14

## 2021-12-07 RX ORDER — HEPARIN SODIUM (PORCINE) LOCK FLUSH IV SOLN 100 UNIT/ML 100 UNIT/ML
5 SOLUTION INTRAVENOUS
Status: CANCELLED | OUTPATIENT
Start: 2021-12-10

## 2021-12-07 RX ORDER — DIPHENHYDRAMINE HCL 50 MG
50 CAPSULE ORAL ONCE
Status: COMPLETED | OUTPATIENT
Start: 2021-12-07 | End: 2021-12-07

## 2021-12-07 RX ORDER — METHYLPREDNISOLONE SODIUM SUCCINATE 125 MG/2ML
125 INJECTION, POWDER, LYOPHILIZED, FOR SOLUTION INTRAMUSCULAR; INTRAVENOUS
Status: CANCELLED
Start: 2021-12-14

## 2021-12-07 RX ORDER — DIPHENHYDRAMINE HYDROCHLORIDE 50 MG/ML
50 INJECTION INTRAMUSCULAR; INTRAVENOUS
Status: CANCELLED
Start: 2021-12-10

## 2021-12-07 RX ORDER — ACETAMINOPHEN 325 MG/1
650 TABLET ORAL ONCE
Status: COMPLETED | OUTPATIENT
Start: 2021-12-07 | End: 2021-12-07

## 2021-12-07 RX ORDER — HEPARIN SODIUM,PORCINE 10 UNIT/ML
5 VIAL (ML) INTRAVENOUS
Status: CANCELLED | OUTPATIENT
Start: 2021-12-10

## 2021-12-07 RX ORDER — ALBUTEROL SULFATE 0.83 MG/ML
2.5 SOLUTION RESPIRATORY (INHALATION)
Status: CANCELLED | OUTPATIENT
Start: 2021-12-07

## 2021-12-07 RX ORDER — EPINEPHRINE 1 MG/ML
0.3 INJECTION, SOLUTION, CONCENTRATE INTRAVENOUS EVERY 5 MIN PRN
Status: CANCELLED | OUTPATIENT
Start: 2021-12-07

## 2021-12-07 RX ORDER — ALBUTEROL SULFATE 90 UG/1
1-2 AEROSOL, METERED RESPIRATORY (INHALATION)
Status: CANCELLED
Start: 2021-12-10

## 2021-12-07 RX ORDER — SODIUM CHLORIDE 9 MG/ML
1000 INJECTION, SOLUTION INTRAVENOUS CONTINUOUS PRN
Status: CANCELLED
Start: 2021-12-17

## 2021-12-07 RX ORDER — NALOXONE HYDROCHLORIDE 0.4 MG/ML
.1-.4 INJECTION, SOLUTION INTRAMUSCULAR; INTRAVENOUS; SUBCUTANEOUS
Status: CANCELLED | OUTPATIENT
Start: 2021-12-10

## 2021-12-07 RX ORDER — HEPARIN SODIUM (PORCINE) LOCK FLUSH IV SOLN 100 UNIT/ML 100 UNIT/ML
5 SOLUTION INTRAVENOUS
Status: CANCELLED | OUTPATIENT
Start: 2021-12-07

## 2021-12-07 RX ORDER — HEPARIN SODIUM (PORCINE) LOCK FLUSH IV SOLN 100 UNIT/ML 100 UNIT/ML
5 SOLUTION INTRAVENOUS
Status: CANCELLED | OUTPATIENT
Start: 2021-12-14

## 2021-12-07 RX ORDER — ALBUTEROL SULFATE 0.83 MG/ML
2.5 SOLUTION RESPIRATORY (INHALATION)
Status: CANCELLED | OUTPATIENT
Start: 2021-12-10

## 2021-12-07 RX ORDER — SODIUM CHLORIDE 9 MG/ML
1000 INJECTION, SOLUTION INTRAVENOUS CONTINUOUS PRN
Status: CANCELLED
Start: 2021-12-14

## 2021-12-07 RX ORDER — LORAZEPAM 2 MG/ML
0.5 INJECTION INTRAMUSCULAR EVERY 4 HOURS PRN
Status: CANCELLED | OUTPATIENT
Start: 2021-12-14

## 2021-12-07 RX ORDER — NALOXONE HYDROCHLORIDE 0.4 MG/ML
.1-.4 INJECTION, SOLUTION INTRAMUSCULAR; INTRAVENOUS; SUBCUTANEOUS
Status: CANCELLED | OUTPATIENT
Start: 2021-12-14

## 2021-12-07 RX ORDER — HEPARIN SODIUM,PORCINE 10 UNIT/ML
5 VIAL (ML) INTRAVENOUS
Status: CANCELLED | OUTPATIENT
Start: 2021-12-07

## 2021-12-07 RX ORDER — EPINEPHRINE 1 MG/ML
0.3 INJECTION, SOLUTION, CONCENTRATE INTRAVENOUS EVERY 5 MIN PRN
Status: CANCELLED | OUTPATIENT
Start: 2021-12-10

## 2021-12-07 RX ORDER — EPINEPHRINE 1 MG/ML
0.3 INJECTION, SOLUTION, CONCENTRATE INTRAVENOUS EVERY 5 MIN PRN
Status: CANCELLED | OUTPATIENT
Start: 2021-12-17

## 2021-12-07 RX ORDER — HEPARIN SODIUM,PORCINE 10 UNIT/ML
5 VIAL (ML) INTRAVENOUS
Status: CANCELLED | OUTPATIENT
Start: 2021-12-17

## 2021-12-07 RX ORDER — DIPHENHYDRAMINE HYDROCHLORIDE 50 MG/ML
50 INJECTION INTRAMUSCULAR; INTRAVENOUS
Status: CANCELLED
Start: 2021-12-07

## 2021-12-07 RX ORDER — DIPHENHYDRAMINE HYDROCHLORIDE 50 MG/ML
50 INJECTION INTRAMUSCULAR; INTRAVENOUS
Status: CANCELLED
Start: 2021-12-17

## 2021-12-07 RX ORDER — SODIUM CHLORIDE 9 MG/ML
1000 INJECTION, SOLUTION INTRAVENOUS CONTINUOUS PRN
Status: CANCELLED
Start: 2021-12-07

## 2021-12-07 RX ORDER — ALBUTEROL SULFATE 90 UG/1
1-2 AEROSOL, METERED RESPIRATORY (INHALATION)
Status: CANCELLED
Start: 2021-12-14

## 2021-12-07 RX ORDER — LORAZEPAM 2 MG/ML
0.5 INJECTION INTRAMUSCULAR EVERY 4 HOURS PRN
Status: CANCELLED | OUTPATIENT
Start: 2021-12-07

## 2021-12-07 RX ORDER — METHYLPREDNISOLONE SODIUM SUCCINATE 125 MG/2ML
125 INJECTION, POWDER, LYOPHILIZED, FOR SOLUTION INTRAMUSCULAR; INTRAVENOUS
Status: CANCELLED
Start: 2021-12-07

## 2021-12-07 RX ORDER — ALBUTEROL SULFATE 90 UG/1
1-2 AEROSOL, METERED RESPIRATORY (INHALATION)
Status: CANCELLED
Start: 2021-12-17

## 2021-12-07 RX ORDER — HEPARIN SODIUM (PORCINE) LOCK FLUSH IV SOLN 100 UNIT/ML 100 UNIT/ML
5 SOLUTION INTRAVENOUS
Status: CANCELLED | OUTPATIENT
Start: 2021-12-17

## 2021-12-07 RX ORDER — LORAZEPAM 2 MG/ML
0.5 INJECTION INTRAMUSCULAR EVERY 4 HOURS PRN
Status: CANCELLED | OUTPATIENT
Start: 2021-12-10

## 2021-12-07 RX ORDER — MEPERIDINE HYDROCHLORIDE 25 MG/ML
25 INJECTION INTRAMUSCULAR; INTRAVENOUS; SUBCUTANEOUS EVERY 30 MIN PRN
Status: CANCELLED | OUTPATIENT
Start: 2021-12-10

## 2021-12-07 RX ORDER — EPINEPHRINE 1 MG/ML
0.3 INJECTION, SOLUTION, CONCENTRATE INTRAVENOUS EVERY 5 MIN PRN
Status: CANCELLED | OUTPATIENT
Start: 2021-12-14

## 2021-12-07 RX ORDER — METHYLPREDNISOLONE SODIUM SUCCINATE 125 MG/2ML
125 INJECTION, POWDER, LYOPHILIZED, FOR SOLUTION INTRAMUSCULAR; INTRAVENOUS
Status: CANCELLED
Start: 2021-12-17

## 2021-12-07 RX ORDER — DIPHENHYDRAMINE HYDROCHLORIDE 50 MG/ML
50 INJECTION INTRAMUSCULAR; INTRAVENOUS
Status: CANCELLED
Start: 2021-12-14

## 2021-12-07 RX ORDER — SODIUM CHLORIDE 9 MG/ML
1000 INJECTION, SOLUTION INTRAVENOUS CONTINUOUS PRN
Status: CANCELLED
Start: 2021-12-10

## 2021-12-07 RX ORDER — HEPARIN SODIUM,PORCINE 10 UNIT/ML
5 VIAL (ML) INTRAVENOUS
Status: CANCELLED | OUTPATIENT
Start: 2021-12-14

## 2021-12-07 RX ORDER — ALBUTEROL SULFATE 90 UG/1
1-2 AEROSOL, METERED RESPIRATORY (INHALATION)
Status: CANCELLED
Start: 2021-12-07

## 2021-12-07 RX ORDER — LORAZEPAM 2 MG/ML
0.5 INJECTION INTRAMUSCULAR EVERY 4 HOURS PRN
Status: CANCELLED | OUTPATIENT
Start: 2021-12-17

## 2021-12-07 RX ORDER — ALBUTEROL SULFATE 0.83 MG/ML
2.5 SOLUTION RESPIRATORY (INHALATION)
Status: CANCELLED | OUTPATIENT
Start: 2021-12-17

## 2021-12-07 RX ORDER — NALOXONE HYDROCHLORIDE 0.4 MG/ML
.1-.4 INJECTION, SOLUTION INTRAMUSCULAR; INTRAVENOUS; SUBCUTANEOUS
Status: CANCELLED | OUTPATIENT
Start: 2021-12-07

## 2021-12-07 RX ORDER — METHYLPREDNISOLONE SODIUM SUCCINATE 125 MG/2ML
125 INJECTION, POWDER, LYOPHILIZED, FOR SOLUTION INTRAMUSCULAR; INTRAVENOUS
Status: CANCELLED
Start: 2021-12-10

## 2021-12-07 RX ORDER — MEPERIDINE HYDROCHLORIDE 25 MG/ML
25 INJECTION INTRAMUSCULAR; INTRAVENOUS; SUBCUTANEOUS EVERY 30 MIN PRN
Status: CANCELLED | OUTPATIENT
Start: 2021-12-17

## 2021-12-07 RX ADMIN — ACETAMINOPHEN 650 MG: 325 TABLET, FILM COATED ORAL at 14:55

## 2021-12-07 RX ADMIN — DARATUMUMAB AND HYALURONIDASE-FIHJ (HUMAN RECOMBINANT) 1800 MG: 1800; 30000 INJECTION SUBCUTANEOUS at 15:44

## 2021-12-07 RX ADMIN — BORTEZOMIB 2.4 MG: 3.5 INJECTION, POWDER, LYOPHILIZED, FOR SOLUTION INTRAVENOUS; SUBCUTANEOUS at 15:37

## 2021-12-07 RX ADMIN — DIPHENHYDRAMINE HYDROCHLORIDE 50 MG: 50 CAPSULE ORAL at 14:55

## 2021-12-07 ASSESSMENT — MIFFLIN-ST. JEOR: SCORE: 1223.13

## 2021-12-07 ASSESSMENT — PAIN SCALES - GENERAL: PAINLEVEL: NO PAIN (0)

## 2021-12-07 NOTE — NURSING NOTE
"Oncology Rooming Note    December 7, 2021 2:01 PM   Carmelita Watts is a 71 year old female who presents for:    Chief Complaint   Patient presents with     Oncology Clinic Visit     Follow up Multiple myeloma not having achieved remission      Initial Vitals: /60   Pulse 92   Temp 98  F (36.7  C) (Tympanic)   Resp 20   Ht 1.6 m (5' 3\")   Wt 74.9 kg (165 lb 2 oz)   SpO2 97%   BMI 29.25 kg/m   Estimated body mass index is 29.25 kg/m  as calculated from the following:    Height as of this encounter: 1.6 m (5' 3\").    Weight as of this encounter: 74.9 kg (165 lb 2 oz). Body surface area is 1.82 meters squared.  No Pain (0) Comment: Data Unavailable   No LMP recorded. Patient is postmenopausal.  Allergies reviewed: Yes  Medications reviewed: Yes    Medications: Medication refills not needed today.  Pharmacy name entered into [facility]:    [id number] [place], [place] - [address]  AT [facility] On license of [facility] [address]  [facility] - [place], [place] - [address]            "

## 2021-12-07 NOTE — PATIENT INSTRUCTIONS
We would like to see you back per schedule.     When you are in need of a refill, please call your pharmacy and they will send us a request.     If you have any questions please call [phone]    Other instructions:  none

## 2021-12-07 NOTE — PROGRESS NOTES
Patient is 71 years old, here today accompanied by self for injection of Velcade and FASPRO per order of Dr Walker.     Component      Latest Ref Rng & Units 12/7/2021   WBC      4.0 - 11.0 10e3/uL 5.4   RBC Count      3.80 - 5.20 10e6/uL 4.31   Hemoglobin      11.7 - 15.7 g/dL 13.3   Hematocrit      35.0 - 47.0 % 39.7   MCV      78 - 100 fL 92   MCH      26.5 - 33.0 pg 30.9   MCHC      31.5 - 36.5 g/dL 33.5   RDW      10.0 - 15.0 % 15.7 (H)   Platelet Count      150 - 450 10e3/uL 283   % Neutrophils      % 53   % Lymphocytes      % 23   % Monocytes      % 12   % Eosinophils      % 10   % Basophils      % 1   % Immature Granulocytes      % 1   NRBCs per 100 WBC      <1 /100 0   Absolute Neutrophils      1.6 - 8.3 10e3/uL 2.8   Absolute Lymphocytes      0.8 - 5.3 10e3/uL 1.3   Absolute Monocytes      0.0 - 1.3 10e3/uL 0.7   Absolute Eosinophils      0.0 - 0.7 10e3/uL 0.6   Absolute Basophils      0.0 - 0.2 10e3/uL 0.0   Absolute Immature Granulocytes      <=0.4 10e3/uL 0.1   Absolute NRBCs      10e3/uL 0.0   Sodium      133 - 144 mmol/L 139   Potassium      3.4 - 5.3 mmol/L 3.8   Chloride      94 - 109 mmol/L 107   Carbon Dioxide      20 - 32 mmol/L 28   Anion Gap      3 - 14 mmol/L 4   Urea Nitrogen      7 - 30 mg/dL 18   Creatinine      0.52 - 1.04 mg/dL 0.70   Calcium      8.5 - 10.1 mg/dL 9.3   Glucose      70 - 99 mg/dL 84   Alkaline Phosphatase      40 - 150 U/L 110   AST      0 - 45 U/L 12   ALT      0 - 50 U/L 18   Protein Total      6.8 - 8.8 g/dL 7.1   Albumin      3.4 - 5.0 g/dL 3.6   Bilirubin Total      0.2 - 1.3 mg/dL 0.4   GFR Estimate      >60 mL/min/1.73m2 86       Patient had home meds, allergies, fall/abuse screen done at [facility] appt prior. This nurse reviewed all prior to releasing treatment.    Labs meet parameters for today's injection.     Patient denies questions nor concerns regarding medication, administration site, side effects, nor aftercare.  Patient identified with two identifiers,  order verified, and verbal consent for today's injection obtained from patient.   Patient education provided on s/s of injection site infection, and/or medication specific side effects, and when to call a provider.  Patient instructed to report any adverse effects.     FASPRO administered per protocol in left lower quadrant at 45 degree angle.  Site covered with sterile bandage.  Patient tolerated injection well, no verbal nor non-verbal signs of discomfort noted.  No adverse effects noted at this time.     Velcade administered per protocol in left arm at 45 degree angle.  Site covered with sterile bandage.  Patient tolerated injection well, no verbal nor non-verbal signs of discomfort noted.  No adverse effects noted at this time.     Patient instructed to call with further questions or concerns.  Patient states understanding and is in agreement with this plan. Patient discharged.

## 2021-12-07 NOTE — PROGRESS NOTES
Oncology Follow-up Visit:  December 7, 2021    Reason for Visit:  Patient presents with:  Oncology Clinic Visit: Follow up Multiple myeloma not having achieved remission      Nursing Note and documentation reviewed: yes    HPI:   This is a 71-year-old female patient who presents to the oncology clinic today for evaluation prior to receiving cycle 17 day 1 therapy for Stage II-RISS IgG multiple myeloma diagnosed June 2019.  She progressed on Velcade and dexamethasone and CyBorD and treated was changed to Darzalex faspro injection. M-spike went from 1.0 to 1.2 in March so Velcade and dex were added to treatment plan.    She presents to the clinic today stating she is doing all right.  She does have some increased fatigue but this is manageable.  She rates this at about a 2-3 on a 0-to-10 scale.  She states she has been having some blurry vision and will be seeing an eye doctor on December 9.  Otherwise, she is doing okay.    Oncologic History:     12/31/2018   patient presented to the emergency room with vertigo and fatigue.  CT scan of the head was negative and subsequent stress test was negative.  5/3/2019  She was seen by her PCP who ordered lab work and noted a total protein of 12.9.  SPEP at that time showed an M spike of 6.2 with a large monoclonal protein seen in the gamma fraction.  Urine immunofixation showed a possible small protein band in the gamma fraction  5/31/2019  she was evaluated by Dr. Walker with Medical Oncology with plan to rule out myeloma and obtain bone marrow aspiration biopsy as well as a metastatic bone survey along with additional labwork  6/18/2019 she underwent bone marrow aspiration and biopsy  6/24/2019  She was seen again by Dr. Walker and CBC showed a hemoglobin of 9.3, M spike 7.3 with monoclonal IgG immunoglobulin of kappa light chain type; serum viscosity was 2.9; quantitative immunoglobulins showed an IgG of 8160, beta-2 microglobulin was 5.8, BUN was 21 with creatinine is  0.8 and total protein was 13.7.  Quantitative kappa/lambda free light chains showed an elevated ratio of 17.0 bone marrow aspiration biopsy showed plasma cell myeloma with approximately 80% plasma cells.  Immunofixation showed IgG kappa and flow cytometry revealed kappa monotypic plasma cells consistent with clonal plasma cell neoplasm and FISH panel was pending at that time.  It was felt she had at least stage II disease based on her beta-2 microglobulin and anemia.  Plan was to treat with Velcade 1.3 mg per metered squared days 1, 4, 8 and 11/Decadron 40 mg on days 1, 8 and 15 initiation of Revlimid with the second cycle at 25 mg daily days 1 through 14.  Plan was to also obtain an MRI of the lumbar spine to rule out lytic lesion at L3.  She was initiated on Zovirax 400 mg p.o. twice daily.  6/25/2019  1st cycle of chemotherapy initiated  7/1/2019 note in chart regarding patient's large co-pay for the Revlimid and no plan at this point to initiate Revlimid and treat only with Velcade and Decadron per Dr. Walker  7/11/2019  MRI lumbar spine shows a pathologic superior endplate compression fracture at L3 without evidence of retropulsed fragment and innumerable enhancing lesions throughout the lumbar spine consistent with history of multiple myeloma.  9/10/2019  Increasing M-spike and kappa lambda ratio  10/1/2019  Initiation of CyBorD  11/17/2021  Stabilization of M-spike at 1.2  12/1/2020  Initiation of Darzalex Faspro injection  3/23/2021  M-spike increased form 1.0 to 1.2 and velcade and dexamethasone added to plan     Current Chemo Regime/TX:  Darzalex faspro injection 1800mg subcutaneous per protocol with velcade 1.3mg/m2 and dexamethasone 20mg added to day 4, 8, 11 with cycle given every 21 days      **Zometa [id number] every 3 months  Current Cycle: 17 day 1 (cycle 5 added velcade dex)  # of completed cycles:  16     Previous treatment:   Velcade 1.3 mg/m2 days 1, 4, 8 and 11 with Decadron 40 mg days 1, 8  "and 15 x 4 cycles;   Velcade 1.5mg.m2/cyclophosphamide 150mg every 7 days on days 1,8,15 and 22/decadron 40mg days 1,8,15,22 ; Darzalex faspro injection 1800mg subcutaneous per protocol     Past Medical History:   Diagnosis Date     Arthritis      Depressive disorder      Diabetes mellitus, type 2 (H) 1/18/2021     Essential hypertension 10/1/2015     Major depressive disorder, recurrent episode, mild (H) 10/1/2015     Mixed hyperlipidemia 10/1/2015     Multiple myeloma not having achieved remission (H) 6/24/2019     Other specified disorders of bladder 07/09/2012    Irritable Bladder     Seasonal allergies 10/1/2015     Unspecified essential hypertension 03/19/2007     Unspecified sinusitis (chronic) 09/05/2007       Past Surgical History:   Procedure Laterality Date     APPENDECTOMY       BONE MARROW BIOPSY, BONE SPECIMEN, NEEDLE/TROCAR N/A 6/18/2019    Procedure: BONE MARROW BIOPSY;  Surgeon: Maciej Sanz MD;  Location: [facility]     CHOLECYSTECTOMY       COLONOSCOPY  07-    repeat 10 years     COLONOSCOPY N/A 12/30/2016    Procedure: COLONOSCOPY;  Surgeon: Bhaskar Franklin DO;  Location: [facility]     SINUS SURGERY       TUBAL STERILIZATION         Family History   Problem Relation Age of Onset     Breast Cancer Mother 66        Cause of Death     Parkinsonism Father         \"Possible\"     Coronary Artery Disease Father      Pacemaker Father      Thyroid Disease Daughter      Diabetes No family hx of      Hypertension No family hx of      Hyperlipidemia No family hx of      Cerebrovascular Disease No family hx of      Colon Cancer No family hx of      Prostate Cancer No family hx of      Genetic Disorder No family hx of      Asthma No family hx of      Anesthesia Reaction No family hx of        Social History     Socioeconomic History     Marital status:      Spouse name: Not on file     Number of children: Not on file     Years of education: Not on file     Highest education level: Not on file "   Occupational History     Occupation: Financial     Comment:  - ([employer])   Tobacco Use     Smoking status: Never Smoker     Smokeless tobacco: Never Used     Tobacco comment: Tried to Quit (YES); QUIT in 1971; Passive Exposure (NO)   Substance and Sexual Activity     Alcohol use: Yes     Comment: RARELY     Drug use: No     Sexual activity: Yes     Partners: Male     Birth control/protection: None   Other Topics Concern      Service Not Asked     Blood Transfusions Not Asked     Caffeine Concern Yes     Comment: Coffee >6 cups daily     Occupational Exposure Not Asked     Hobby Hazards Not Asked     Sleep Concern Not Asked     Stress Concern Not Asked     Weight Concern Not Asked     Special Diet Not Asked     Back Care Not Asked     Exercise Not Asked     Bike Helmet Not Asked     Seat Belt Not Asked     Self-Exams Not Asked     Parent/sibling w/ CABG, MI or angioplasty before 65F 55M? No   Social History Narrative     Not on file     Social Determinants of Health     Financial Resource Strain: Not on file   Food Insecurity: Not on file   Transportation Needs: Not on file   Physical Activity: Not on file   Stress: Not on file   Social Connections: Not on file   Intimate Partner Violence: Not on file   Housing Stability: Not on file       Current Outpatient Medications   Medication     acyclovir (ZOVIRAX) 400 MG tablet     aspirin (ASA) 81 MG chewable tablet     atorvastatin (LIPITOR) 10 MG tablet     dexamethasone (DECADRON) 4 MG tablet     fluticasone (FLONASE) 50 MCG/ACT nasal spray     L-Lysine HCl 500 MG CAPS     losartan (COZAAR) 50 MG tablet     sertraline (ZOLOFT) 50 MG tablet     benzonatate (TESSALON) 200 MG capsule     EPINEPHrine (EPIPEN) 0.3 MG/0.3ML injection     prochlorperazine (COMPAZINE) 10 MG tablet     No current facility-administered medications for this visit.        Allergies   Allergen Reactions     Lisinopril Cough     Phenylephrine Hcl Other (See Comments)      "**Entex  HEADACHE (SEVERE)     Phenylpropanolamine Other (See Comments)     **Entex  HEADACHE (SEVERE)     Pseudoephedrine Tannate Other (See Comments)     **Entex  HEADACHE (SEVERE)     Levofloxacin Rash     **Levaquin     Moxifloxacin Hcl [Avelox] Rash       Review Of Systems:  Constitutional:    denies fever, weight changes, chills, and night sweats.  Eyes:    denies blurred or double vision  Ears/Nose/Throat:   denies ear pain, nose problems, difficulty swallowing  Respiratory:   denies shortness of breath, cough   Skin:   denies rash, lesions  Cardiovascular:   denies chest pain, palpitations, edema  Gastrointestinal:   denies abdominal pain, bloating, early satiety, had some nausea for a short time; no change in bowel habits or blood in stool  Genitourinary:   denies difficulty with urination, blood in urine  Musculoskeletal:    denies new muscle pain, bone pain  Neurologic:   denies lightheadedness, headaches, numbness or tingling  Psychiatric:   denies anxiety, depression  Hematologic/Lymphatic/Immunologic:   denies easy bruising, easy bleeding, lumps or bumps noted  Endocrine:   Denies increased thirst      ECOG Performance Status: 1    Physical Exam:  /60   Pulse 92   Temp 98  F (36.7  C) (Tympanic)   Resp 20   Ht 1.6 m (5' 3\")   Wt 74.9 kg (165 lb 2 oz)   SpO2 97%   BMI 29.25 kg/m      GENERAL APPEARANCE: Healthy, alert and in no acute distress.  HEENT: Normocephalic, Sclerae anicteric.   NECK:   No asymmetry or masses, no thyromegaly.  LYMPHATICS: No palpable cervical, supraclavicular nodes  RESP: Lungs clear to auscultation bilaterally, respirations regular and easy  CARDIOVASCULAR: Regular rate and rhythm with occasional missed beat; normal S1, S2; no murmur, gallop, or rub.  NEURO: Alert and oriented x 3.  Gait steady.  PSYCHIATRIC: Mentation and affect appear normal.  Mood appropriate.    Laboratory:  Results for orders placed or performed in visit on 12/07/21   Comprehensive metabolic " panel     Status: Normal   Result Value Ref Range    Sodium 139 133 - 144 mmol/L    Potassium 3.8 3.4 - 5.3 mmol/L    Chloride 107 94 - 109 mmol/L    Carbon Dioxide (CO2) 28 20 - 32 mmol/L    Anion Gap 4 3 - 14 mmol/L    Urea Nitrogen 18 7 - 30 mg/dL    Creatinine 0.70 0.52 - 1.04 mg/dL    Calcium 9.3 8.5 - 10.1 mg/dL    Glucose 84 70 - 99 mg/dL    Alkaline Phosphatase 110 40 - 150 U/L    AST 12 0 - 45 U/L    ALT 18 0 - 50 U/L    Protein Total 7.1 6.8 - 8.8 g/dL    Albumin 3.6 3.4 - 5.0 g/dL    Bilirubin Total 0.4 0.2 - 1.3 mg/dL    GFR Estimate 86 >60 mL/min/1.73m2   CBC with platelets and differential     Status: Abnormal   Result Value Ref Range    WBC Count 5.4 4.0 - 11.0 10e3/uL    RBC Count 4.31 3.80 - 5.20 10e6/uL    Hemoglobin 13.3 11.7 - 15.7 g/dL    Hematocrit 39.7 35.0 - 47.0 %    MCV 92 78 - 100 fL    MCH 30.9 26.5 - 33.0 pg    MCHC 33.5 31.5 - 36.5 g/dL    RDW 15.7 (H) 10.0 - 15.0 %    Platelet Count 283 150 - 450 10e3/uL    % Neutrophils 53 %    % Lymphocytes 23 %    % Monocytes 12 %    % Eosinophils 10 %    % Basophils 1 %    % Immature Granulocytes 1 %    NRBCs per 100 WBC 0 <1 /100    Absolute Neutrophils 2.8 1.6 - 8.3 10e3/uL    Absolute Lymphocytes 1.3 0.8 - 5.3 10e3/uL    Absolute Monocytes 0.7 0.0 - 1.3 10e3/uL    Absolute Eosinophils 0.6 0.0 - 0.7 10e3/uL    Absolute Basophils 0.0 0.0 - 0.2 10e3/uL    Absolute Immature Granulocytes 0.1 <=0.4 10e3/uL    Absolute NRBCs 0.0 10e3/uL   CBC with platelets differential     Status: Abnormal    Narrative    The following orders were created for panel order CBC with platelets differential.  Procedure                               Abnormality         Status                     ---------                               -----------         ------                     CBC with platelets and d...[[id number]]  Abnormal            Final result                 Please view results for these tests on the individual orders.     Component      Latest Ref Rng & Units  11/30/2021   Albumin Fraction      3.7 - 5.1 g/dL 4.0   Alpha 1 Fraction      0.2 - 0.4 g/dL 0.4   Alpha 2 Fraction      0.5 - 0.9 g/dL 0.7   Beta Fraction      0.6 - 1.0 g/dL 0.7   Gamma Fraction      0.7 - 1.6 g/dL 0.8   Monoclonal Peak      <=0.0 g/dL 0.3 (H)   ELP Interpretation:       Fairly small monoclonal protein (about 0.3 g/dL) seen in the gamma fraction. See immunofixation report on same specimen. Pathologic significance requires clinical correlation. BIA Izaguirre M.D., Ph.D., Pathologist ().   Bracey Free Light Chains      0.33 - 1.94 mg/dL 0.58   LAMBDA FREE LT CHAINS      0.57 - 2.63 mg/dL 0.27 (L)   KAPPA/LAMBDA RATIO      0.26 - 1.65 2.15 (H)   IGG      610-1,616 mg/dL 817   IGA      84 - 499 mg/dL 11 (L)   IGM      35 - 242 mg/dL 15 (L)   Immunofixation ELP       Quite obvious monoclonal IgG immunoglobulin of kappa light chain type. Monoclonal antibody therapeutics (e.g. Daratumumab) may appear as monoclonal proteins on serum electrophoresis and immunofixation, although usually very small. Nevertheless, rsults should be interpreted with caution if the patient is receiving monoclonal antibody therapy. Pathological significance requires clinical correlation. BIA Izaguirre M.D., Ph.D.([phone])   Viscosity Index      1.4 - 1.8 1.6   Beta-2-Microglobulin      <2.3 mg/L 3.3 (H)   Total Protein Serum for ELP      6.8 - 8.8 g/dL 6.6 (L)     Imaging Studies:  None for today's visit      ASSESSMENT/PLAN:    #1 Multiple myeloma:   IgG kappa multiple myeloma with 80% involvement of the bone marrow diagnosed June 2019 initially treated with Velcade and Decadron and received 4 cycles with increasing M-spike and kappa/lambda ratio.  Treatment changed to CyBorD on 10/1/2019.  M-spike plateau at 1.2 and treatment changed to monotherapy with Darzalex Faspro injection.     M spike declined to 1.0 then increased again to 1.2 so Velcade and Dex added to her treatment plan with cycle 5  therapy.  M-spike increased from 0.2-0.3.  Will initiate cycle 17 and patient will follow up prior to cycle 18 with a CBC, CMP, SPEP, quantitative immunoglobulins, protein immunofixation and kappa lambda ratio.      #2 lytic lesion: She will continue with the Zometa every 3 months.  Next infusion next week.  She remains on calcium with vitamin D twice a day and dental exams at least every 6 months.      I encouraged patient to call with any questions or concerns.      Barbie Quintana NP  APRN, FNP-BC, AOCNP

## 2021-12-10 ENCOUNTER — INFUSION THERAPY VISIT (OUTPATIENT)
Dept: INFUSION THERAPY | Facility: OTHER | Age: 71
End: 2021-12-10
Attending: INTERNAL MEDICINE
Payer: MEDICARE

## 2021-12-10 VITALS
HEART RATE: 95 BPM | RESPIRATION RATE: 18 BRPM | BODY MASS INDEX: 29.3 KG/M2 | OXYGEN SATURATION: 98 % | DIASTOLIC BLOOD PRESSURE: 74 MMHG | HEIGHT: 63 IN | SYSTOLIC BLOOD PRESSURE: 117 MMHG | WEIGHT: 165.34 LBS | TEMPERATURE: 97.2 F

## 2021-12-10 DIAGNOSIS — C90.00 MULTIPLE MYELOMA NOT HAVING ACHIEVED REMISSION (H): Primary | ICD-10-CM

## 2021-12-10 PROCEDURE — 250N000011 HC RX IP 250 OP 636: Mod: JW | Performed by: NURSE PRACTITIONER

## 2021-12-10 PROCEDURE — 96401 CHEMO ANTI-NEOPL SQ/IM: CPT

## 2021-12-10 RX ADMIN — BORTEZOMIB 2.4 MG: 3.5 INJECTION, POWDER, LYOPHILIZED, FOR SOLUTION INTRAVENOUS; SUBCUTANEOUS at 13:13

## 2021-12-10 ASSESSMENT — PAIN SCALES - GENERAL: PAINLEVEL: NO PAIN (0)

## 2021-12-10 ASSESSMENT — MIFFLIN-ST. JEOR: SCORE: 1224

## 2021-12-10 NOTE — PROGRESS NOTES
Patient is 71 years old, here today for injection of Velcade per order of . Patient meets parameters for today's infusion. Patient identified with two identifiers, order verified, and verbal consent for today's infusion obtained from patient. Written consent for treatment is on file and valid.    Patient meets order parameters for today's treatment.    Patient denies questions or concerns regarding injection and/or medication(s) being administered.    Velcade injected SQ into right upper outer arm at a 45 degree angle per protocol rotating sites. Patient tolerated injection without incident, no signs or symptoms of adverse reaction noted. Patient denies pain or discomfort.     Covered with a sterile bandage. Pt instructed to leave bandage intact for a minimum of one hour, and to call with questions or concerns. Patient states understanding, discharged.

## 2021-12-13 ENCOUNTER — TELEPHONE (OUTPATIENT)
Dept: ONCOLOGY | Facility: OTHER | Age: 71
End: 2021-12-13
Payer: COMMERCIAL

## 2021-12-13 DIAGNOSIS — C90.00 MULTIPLE MYELOMA NOT HAVING ACHIEVED REMISSION (H): Primary | ICD-10-CM

## 2021-12-13 NOTE — TELEPHONE ENCOUNTER
TC from patient, states she and Bonita [Lilian] had spoken about cancer rehab, and she would like to have a referral placed for this now that she has had time to think about it.  Routed to Oncology pool for review.

## 2021-12-14 ENCOUNTER — INFUSION THERAPY VISIT (OUTPATIENT)
Dept: INFUSION THERAPY | Facility: OTHER | Age: 71
End: 2021-12-14
Attending: INTERNAL MEDICINE
Payer: MEDICARE

## 2021-12-14 VITALS
WEIGHT: 164.46 LBS | DIASTOLIC BLOOD PRESSURE: 74 MMHG | SYSTOLIC BLOOD PRESSURE: 106 MMHG | RESPIRATION RATE: 18 BRPM | HEIGHT: 63 IN | BODY MASS INDEX: 29.14 KG/M2 | HEART RATE: 79 BPM | OXYGEN SATURATION: 98 % | TEMPERATURE: 98.6 F

## 2021-12-14 DIAGNOSIS — C90.00 MULTIPLE MYELOMA NOT HAVING ACHIEVED REMISSION (H): Primary | ICD-10-CM

## 2021-12-14 LAB
BASOPHILS # BLD AUTO: 0 10E3/UL (ref 0–0.2)
BASOPHILS NFR BLD AUTO: 1 %
CALCIUM SERPL-MCNC: 9.3 MG/DL (ref 8.5–10.1)
CREAT SERPL-MCNC: 0.74 MG/DL (ref 0.52–1.04)
EOSINOPHIL # BLD AUTO: 0.3 10E3/UL (ref 0–0.7)
EOSINOPHIL NFR BLD AUTO: 8 %
ERYTHROCYTE [DISTWIDTH] IN BLOOD BY AUTOMATED COUNT: 15.4 % (ref 10–15)
GFR SERPL CREATININE-BSD FRML MDRD: 81 ML/MIN/1.73M2
HCT VFR BLD AUTO: 40.2 % (ref 35–47)
HGB BLD-MCNC: 13.4 G/DL (ref 11.7–15.7)
IMM GRANULOCYTES # BLD: 0.1 10E3/UL
IMM GRANULOCYTES NFR BLD: 2 %
LYMPHOCYTES # BLD AUTO: 0.7 10E3/UL (ref 0.8–5.3)
LYMPHOCYTES NFR BLD AUTO: 18 %
MCH RBC QN AUTO: 30.2 PG (ref 26.5–33)
MCHC RBC AUTO-ENTMCNC: 33.3 G/DL (ref 31.5–36.5)
MCV RBC AUTO: 91 FL (ref 78–100)
MONOCYTES # BLD AUTO: 0.4 10E3/UL (ref 0–1.3)
MONOCYTES NFR BLD AUTO: 11 %
NEUTROPHILS # BLD AUTO: 2.4 10E3/UL (ref 1.6–8.3)
NEUTROPHILS NFR BLD AUTO: 60 %
NRBC # BLD AUTO: 0 10E3/UL
NRBC BLD AUTO-RTO: 1 /100
PLATELET # BLD AUTO: 223 10E3/UL (ref 150–450)
RBC # BLD AUTO: 4.43 10E6/UL (ref 3.8–5.2)
WBC # BLD AUTO: 3.9 10E3/UL (ref 4–11)

## 2021-12-14 PROCEDURE — 36415 COLL VENOUS BLD VENIPUNCTURE: CPT | Mod: ZL | Performed by: NURSE PRACTITIONER

## 2021-12-14 PROCEDURE — 82565 ASSAY OF CREATININE: CPT | Mod: ZL | Performed by: NURSE PRACTITIONER

## 2021-12-14 PROCEDURE — 85025 COMPLETE CBC W/AUTO DIFF WBC: CPT | Mod: ZL | Performed by: NURSE PRACTITIONER

## 2021-12-14 PROCEDURE — 82310 ASSAY OF CALCIUM: CPT | Mod: ZL | Performed by: NURSE PRACTITIONER

## 2021-12-14 PROCEDURE — 96401 CHEMO ANTI-NEOPL SQ/IM: CPT

## 2021-12-14 PROCEDURE — 258N000003 HC RX IP 258 OP 636: Performed by: NURSE PRACTITIONER

## 2021-12-14 PROCEDURE — 250N000011 HC RX IP 250 OP 636: Mod: JW | Performed by: NURSE PRACTITIONER

## 2021-12-14 PROCEDURE — 96365 THER/PROPH/DIAG IV INF INIT: CPT

## 2021-12-14 RX ORDER — ZOLEDRONIC ACID 0.04 MG/ML
4 INJECTION, SOLUTION INTRAVENOUS ONCE
Status: CANCELLED | OUTPATIENT
Start: 2021-12-14 | End: 2021-12-14

## 2021-12-14 RX ORDER — ZOLEDRONIC ACID 0.04 MG/ML
4 INJECTION, SOLUTION INTRAVENOUS ONCE
Status: COMPLETED | OUTPATIENT
Start: 2021-12-14 | End: 2021-12-14

## 2021-12-14 RX ADMIN — SODIUM CHLORIDE 250 ML: 9 INJECTION, SOLUTION INTRAVENOUS at 13:35

## 2021-12-14 RX ADMIN — BORTEZOMIB 2.4 MG: 3.5 INJECTION, POWDER, LYOPHILIZED, FOR SOLUTION INTRAVENOUS; SUBCUTANEOUS at 14:04

## 2021-12-14 RX ADMIN — ZOLEDRONIC ACID 4 MG: 0.04 INJECTION, SOLUTION INTRAVENOUS at 13:38

## 2021-12-14 ASSESSMENT — MIFFLIN-ST. JEOR: SCORE: 1220

## 2021-12-14 NOTE — PATIENT INSTRUCTIONS

## 2021-12-14 NOTE — PROGRESS NOTES
Patient is a 71 year old here today for infusion of zometa per order of Dr Walker.  Patient identified with two identifiers, order verified, and verbal consent for today's infusion obtained from patient.      Component      Latest Ref Rng & Units 12/14/2021   WBC      4.0 - 11.0 10e3/uL 3.9 (L)   RBC Count      3.80 - 5.20 10e6/uL 4.43   Hemoglobin      11.7 - 15.7 g/dL 13.4   Hematocrit      35.0 - 47.0 % 40.2   MCV      78 - 100 fL 91   MCH      26.5 - 33.0 pg 30.2   MCHC      31.5 - 36.5 g/dL 33.3   RDW      10.0 - 15.0 % 15.4 (H)   Platelet Count      150 - 450 10e3/uL 223   % Neutrophils      % 60   % Lymphocytes      % 18   % Monocytes      % 11   % Eosinophils      % 8   % Basophils      % 1   % Immature Granulocytes      % 2   NRBCs per 100 WBC      <1 /100 1 (H)   Absolute Neutrophils      1.6 - 8.3 10e3/uL 2.4   Absolute Lymphocytes      0.8 - 5.3 10e3/uL 0.7 (L)   Absolute Monocytes      0.0 - 1.3 10e3/uL 0.4   Absolute Eosinophils      0.0 - 0.7 10e3/uL 0.3   Absolute Basophils      0.0 - 0.2 10e3/uL 0.0   Absolute Immature Granulocytes      <=0.4 10e3/uL 0.1   Absolute NRBCs      10e3/uL 0.0   Creatinine      0.52 - 1.04 mg/dL 0.74   GFR Estimate      >60 mL/min/1.73m2 81   Calcium      8.5 - 10.1 mg/dL 9.3       Patient meets order parameters for today's treatment.     Patient denies questions or concerns regarding infusion and/or medication(s) being administered.    1338 IV pump verified with zometa dose, drug, and rate of administration.  Infusion administered per protocol.  Patient tolerated infusion zometa, no signs or symptoms of adverse reaction noted.  Patient denies pain nor discomfort.     IV removed, catheter intact.  Site clean, dry and intact.  No signs or symptoms of infiltration or infection.  Covered with a sterile bandage, slight pressure applied for 30 seconds.  Pt instructed to leave bandage intact for a minimum of one hour, and to call with questions or concerns. Patient states  understanding, discharged.

## 2021-12-14 NOTE — PROGRESS NOTES
Patient is a 71 year old here today for injection of Velcade per order of . Patient meets parameters for today's infusion. Patient identified with two identifiers, order verified, and verbal consent for today's infusion obtained from patient.     Component      Latest Ref Rng & Units 12/14/2021   WBC      4.0 - 11.0 10e3/uL 3.9 (L)   RBC Count      3.80 - 5.20 10e6/uL 4.43   Hemoglobin      11.7 - 15.7 g/dL 13.4   Hematocrit      35.0 - 47.0 % 40.2   MCV      78 - 100 fL 91   MCH      26.5 - 33.0 pg 30.2   MCHC      31.5 - 36.5 g/dL 33.3   RDW      10.0 - 15.0 % 15.4 (H)   Platelet Count      150 - 450 10e3/uL 223   % Neutrophils      % 60   % Lymphocytes      % 18   % Monocytes      % 11   % Eosinophils      % 8   % Basophils      % 1   % Immature Granulocytes      % 2   NRBCs per 100 WBC      <1 /100 1 (H)   Absolute Neutrophils      1.6 - 8.3 10e3/uL 2.4   Absolute Lymphocytes      0.8 - 5.3 10e3/uL 0.7 (L)   Absolute Monocytes      0.0 - 1.3 10e3/uL 0.4   Absolute Eosinophils      0.0 - 0.7 10e3/uL 0.3   Absolute Basophils      0.0 - 0.2 10e3/uL 0.0   Absolute Immature Granulocytes      <=0.4 10e3/uL 0.1   Absolute NRBCs      10e3/uL 0.0   Creatinine      0.52 - 1.04 mg/dL 0.74   GFR Estimate      >60 mL/min/1.73m2 81   Calcium      8.5 - 10.1 mg/dL 9.3       Patient meets order parameters for today's treatment.    Patient denies questions or concerns regarding injection and/or medication(s) being administered.    Velcade injected SQ into left upper outer arm per protocol rotating sites.     Injection administered per protocol. Patient tolerated infusion without incident, no signs or symptoms of adverse reaction noted. Patient denies pain or discomfort.     Covered with a sterile bandage. Pt instructed to leave bandage intact for a minimum of one hour, and to call with questions or concerns. Patient states understanding, discharged ambulatory.

## 2021-12-15 ENCOUNTER — HOSPITAL ENCOUNTER (OUTPATIENT)
Dept: PHYSICAL THERAPY | Facility: HOSPITAL | Age: 71
Setting detail: THERAPIES SERIES
End: 2021-12-15
Attending: NURSE PRACTITIONER
Payer: MEDICARE

## 2021-12-15 DIAGNOSIS — C90.00 MULTIPLE MYELOMA NOT HAVING ACHIEVED REMISSION (H): ICD-10-CM

## 2021-12-15 PROCEDURE — 97110 THERAPEUTIC EXERCISES: CPT | Mod: GP

## 2021-12-15 PROCEDURE — 97161 PT EVAL LOW COMPLEX 20 MIN: CPT | Mod: GP

## 2021-12-16 NOTE — PROGRESS NOTES
12/15/21 0800   General Information   Type of Visit Initial OP Ortho PT Evaluation   Start of Care Date 12/15/21   Referring Physician Dr. Barbie Quintana   Patient/Family Goals Statement Patient hopes to have a custom HEP to help increase her strength, stability and decrease her fatigue.    Orders Evaluate and Treat   Date of Order 12/15/21   Certification Required? Yes   Medical Diagnosis Multiple Myeloma not having achieved remission   Surgical/Medical history reviewed Yes   Precautions/Limitations other (see comments)  (currently completing chemo)   Weight-Bearing Status - LUE full weight-bearing   Weight-Bearing Status - RUE full weight-bearing   Weight-Bearing Status - LLE full weight-bearing   Weight-Bearing Status - RLE full weight-bearing   General Information Comments Patient arrived to therapy today stating she has been going through cancer treatments for years. She states she is hopeful she is close to remission. She states she lost her  last year so she has had a couple tough years. She states she currently is going through chemo and would like a HEP to help manage her care and give her more strength and stability. She states she gets her infusions on Tuesday and Friday. She states she has a son and a daughter but lives alone. She notes she has a treadmill but does not go on it as often as she should. She states she is agreeable to work with the PT today. She notes she has no pain upon arrival.        Present No   Body Part(s)   Body Part(s) Hip   Presentation and Etiology   Pertinent history of current problem (include personal factors and/or comorbidities that impact the POC) Patient states she is doing fair but thinks she can do better. She states she wants a HEP to complete at home but wants someone to hold her accountable.    Impairments F. Decreased strength and endurance;G. Impaired balance   Functional Limitations perform activities of daily living;perform  desired leisure / sports activities   How/Where did it occur Other  (cancer treatments)   Onset date of current episode/exacerbation 12/15/21   Chronicity Other  (cancer treatment the last two years)   Pain rating (0-10 point scale) Denies pain   Frequency of pain/symptoms D. Other  (Cancer )   Prior Level of Function   Prior Level of Function-Mobility Indep with all tasks    Current Level of Function   Patient role/employment history F. Retired   Living environment House/townhome   Current equipment-Gait/Locomotion None   Current equipment-ADL None   Fall Risk Screen   Fall screen completed by PT   Have you fallen 2 or more times in the past year? No   Have you fallen and had an injury in the past year? No   Is patient a fall risk? No   Abuse Screen (yes response referral indicated)   Feels Unsafe at Home or Work/School no   Feels Threatened by Someone no   Does Anyone Try to Keep You From Having Contact with Others or Doing Things Outside Your Home? no   Physical Signs of Abuse Present no   Hip Objective Findings   Side (if bilateral, select both right and left) Right   Posture Forward Flexed Posture    Gait/Locomotion Gait appears normal upon Eval   Lumbar ROM WNL   Hip Flexibility Comments Neg   Femoral Nerve Stretch Test Neg   Gluteal Tendopathy Test Neg   Resisted Hip Adduction Test Neg   Straight Leg Raise Test Pos   Scour Test Neg   DOMINIQUE Test Neg   FADIR Test Neg   Palpation No Tenderness noted to palpation over the R hip    Right Hip Flexion PROM WNL    Right Hip Abduction PROM WNL   Right Hip Adduction PROM WNL   Right Hip ER PROM WNL   Right Hip IR PROM WNL   Right Hip Ext PROM WNL   Right Hip Flexion Strength 4/5   Right Hip Abduction Strength 4/5   Right Hip Extension Strength 4/5   Right Hip IR Strength 4/5   Right Hip ER Strength 4/5   Right Knee Flexion Strength 4/5   Right Knee Extension Strength 4/5   Maciej Flexibility Test Neg   Obers/ITB Flexibility Neg   Right Hamstring Flexibility Pos     Right Piriformis Flexibility Pos    Right Prone Quad Flexibility Neg   Planned Therapy Interventions   Planned Therapy Interventions balance training;gait training;manual therapy;neuromuscular re-education;ROM;strengthening;stretching   Planned Modality Interventions   Planned Modality Interventions Ultrasound   Clinical Impression   Criteria for Skilled Therapeutic Interventions Met yes, treatment indicated   PT Diagnosis Multiple myeloma not having received remission: generalized weakness/deconditioning   Influenced by the following impairments Weakness, Decreased stabilitly, Decreased Endurance   Functional limitations due to impairments Difficulty performing her ADLs, Difficutly with increasd fatigue for any extra curricular activites   Clinical Presentation Stable/Uncomplicated   Clinical Presentation Rationale Due to the patient's weakness and overall fatigue, she is requesting therapy services to help strengthen her and allow her to return to an active lifestyle.    Clinical Decision Making (Complexity) Low complexity   Therapy Frequency 1 time/week   Predicted Duration of Therapy Intervention (days/wks) 8 weeks   Risk & Benefits of therapy have been explained Yes   Patient, Family & other staff in agreement with plan of care Yes   Education Assessment   Preferred Learning Style Listening;Demonstration   ORTHO GOALS   PT Ortho Eval Goals 1;2   Ortho Goal 1   Goal Identifier STG - 1    Goal Description Patient will be able to complete 10 RDLs B without LOB to place her in the low fall risk category.    Goal Progress New Goal    Target Date 01/17/22   Ortho Goal 2   Goal Identifier LTG - 1    Goal Description Patient will be indep with a HEP to allow her to progress her strengthening and stability independelntly at home.    Goal Progress New Goal    Target Date 02/15/22   Total Evaluation Time   PT Eval, Low Complexity Minutes (30257) 18   Therapy Certification   Certification date from 12/15/21    Certification date to 02/15/22     I certify the need for these services furnished under this plan of treatment and while under my care. (Physician co-signature of this document indicates review and certification of the therapy plan).

## 2021-12-17 ENCOUNTER — INFUSION THERAPY VISIT (OUTPATIENT)
Dept: INFUSION THERAPY | Facility: OTHER | Age: 71
End: 2021-12-17
Attending: INTERNAL MEDICINE
Payer: MEDICARE

## 2021-12-17 VITALS
RESPIRATION RATE: 18 BRPM | HEART RATE: 69 BPM | DIASTOLIC BLOOD PRESSURE: 82 MMHG | BODY MASS INDEX: 29.06 KG/M2 | SYSTOLIC BLOOD PRESSURE: 123 MMHG | OXYGEN SATURATION: 98 % | WEIGHT: 164.02 LBS | TEMPERATURE: 98.6 F

## 2021-12-17 DIAGNOSIS — C90.00 MULTIPLE MYELOMA NOT HAVING ACHIEVED REMISSION (H): Primary | ICD-10-CM

## 2021-12-17 LAB
BASOPHILS # BLD AUTO: 0 10E3/UL (ref 0–0.2)
BASOPHILS NFR BLD AUTO: 0 %
EOSINOPHIL # BLD AUTO: 0.2 10E3/UL (ref 0–0.7)
EOSINOPHIL NFR BLD AUTO: 3 %
ERYTHROCYTE [DISTWIDTH] IN BLOOD BY AUTOMATED COUNT: 15.3 % (ref 10–15)
HCT VFR BLD AUTO: 38.2 % (ref 35–47)
HGB BLD-MCNC: 12.8 G/DL (ref 11.7–15.7)
IMM GRANULOCYTES # BLD: 0.1 10E3/UL
IMM GRANULOCYTES NFR BLD: 2 %
LYMPHOCYTES # BLD AUTO: 1.1 10E3/UL (ref 0.8–5.3)
LYMPHOCYTES NFR BLD AUTO: 19 %
MCH RBC QN AUTO: 30.6 PG (ref 26.5–33)
MCHC RBC AUTO-ENTMCNC: 33.5 G/DL (ref 31.5–36.5)
MCV RBC AUTO: 91 FL (ref 78–100)
MONOCYTES # BLD AUTO: 0.9 10E3/UL (ref 0–1.3)
MONOCYTES NFR BLD AUTO: 16 %
NEUTROPHILS # BLD AUTO: 3.3 10E3/UL (ref 1.6–8.3)
NEUTROPHILS NFR BLD AUTO: 60 %
NRBC # BLD AUTO: 0 10E3/UL
NRBC BLD AUTO-RTO: 0 /100
PLATELET # BLD AUTO: 147 10E3/UL (ref 150–450)
RBC # BLD AUTO: 4.18 10E6/UL (ref 3.8–5.2)
WBC # BLD AUTO: 5.6 10E3/UL (ref 4–11)

## 2021-12-17 PROCEDURE — 250N000011 HC RX IP 250 OP 636: Performed by: NURSE PRACTITIONER

## 2021-12-17 PROCEDURE — 85025 COMPLETE CBC W/AUTO DIFF WBC: CPT | Mod: ZL

## 2021-12-17 PROCEDURE — 36415 COLL VENOUS BLD VENIPUNCTURE: CPT | Mod: ZL

## 2021-12-17 PROCEDURE — 96401 CHEMO ANTI-NEOPL SQ/IM: CPT

## 2021-12-17 RX ADMIN — BORTEZOMIB 2.4 MG: 3.5 INJECTION, POWDER, LYOPHILIZED, FOR SOLUTION INTRAVENOUS; SUBCUTANEOUS at 13:17

## 2021-12-17 NOTE — PATIENT INSTRUCTIONS

## 2021-12-17 NOTE — PROGRESS NOTES
Patient is a 71 year old here today for injection of Velcade per order of . Patient meets parameters for today's infusion. Patient identified with two identifiers, order verified, and verbal consent for today's infusion obtained from patient. Written consent for treatment is on file and valid.    Component      Latest Ref Rng & Units 12/17/2021   WBC      4.0 - 11.0 10e3/uL 5.6   RBC Count      3.80 - 5.20 10e6/uL 4.18   Hemoglobin      11.7 - 15.7 g/dL 12.8   Hematocrit      35.0 - 47.0 % 38.2   MCV      78 - 100 fL 91   MCH      26.5 - 33.0 pg 30.6   MCHC      31.5 - 36.5 g/dL 33.5   RDW      10.0 - 15.0 % 15.3 (H)   Platelet Count      150 - 450 10e3/uL 147 (L)   % Neutrophils      % 60   % Lymphocytes      % 19   % Monocytes      % 16   % Eosinophils      % 3   % Basophils      % 0   % Immature Granulocytes      % 2   NRBCs per 100 WBC      <1 /100 0   Absolute Neutrophils      1.6 - 8.3 10e3/uL 3.3   Absolute Lymphocytes      0.8 - 5.3 10e3/uL 1.1   Absolute Monocytes      0.0 - 1.3 10e3/uL 0.9   Absolute Eosinophils      0.0 - 0.7 10e3/uL 0.2   Absolute Basophils      0.0 - 0.2 10e3/uL 0.0   Absolute Immature Granulocytes      <=0.4 10e3/uL 0.1   Absolute NRBCs      10e3/uL 0.0       Patient meets order parameters for today's treatment.    Independent dose check completed with MARKO Quintanilla Rn prior to release of drug.    Patient denies questions or concerns regarding injection and/or medication(s) being administered.    Velcade injected SQ into right upper outer arm at 45 degree angle per protocol rotating sites.     Injection administered per protocol. Patient tolerated infusion without incident, no signs or symptoms of adverse reaction noted. Patient denies pain or discomfort.     Covered with a sterile bandage. Pt instructed to leave bandage intact for a minimum of one hour, and to call with questions or concerns. Patient states understanding, discharged ambulatory.

## 2021-12-17 NOTE — PROGRESS NOTES
Peripheral labs ordered. Butterfly needle inserted into right antecubital space.  Immediate blood return noted. 1 lab tube drawn. Needle removed, covered with sterile guaze and coban. Patient tolerated well, denies pain or discomfort at this time.

## 2021-12-20 NOTE — PROGRESS NOTES
Oncology Follow-up Visit    Reason for Visit:  Carmelita is a 71 year old woman with a diagnosis of multiple myeloma, who presents to the clinic today for consideration of ongoing therapy. She is due today to commence C18 Darzalex Faspro, Velcade, and Dexamethasone.     Nursing Note and documentation reviewed: Yes.    Interval History:   Carmelita reports that she is doing well. She has been participating in PT for the last few weeks and notes that she is seeing the benefits from this. She was having some pain in her right hip which she notes has improved. She does note an ache in her right shoulder, although notes that this has been present and unchanged for about the last year. No other new bony aches or pain.     She denies any recent signs of infection. No fever, chills, chest pain, cough, or SOB. No bleeding concerns. She denies nausea or vomiting. Continues to endorse a good appetite. Weight reasonably stable. Will experience intermittent constipation, although blames this on diet. Denies neuropathy. Denies skin rash or other skin concerns. Feels she has gotten some stamina back since starting PT. Reports energy levels are pretty good, staying active around her home. Does her own snow blowing and shoveling. Reports her mood has been good, denies anxiety or depression.     Oncologic History  12/31/2018 Presented to the emergency room with vertigo and fatigue.  CT scan of the head was negative and subsequent stress test was negative.  5/3/2019 PCP ordered lab work and noted a total protein of 12.9.  SPEP at that time showed an M spike of 6.2 with a large monoclonal protein seen in the gamma fraction. Urine immunofixation showed a possible small protein band in the gamma fraction  5/31/2019 Evaluated by Dr. Walker with Medical Oncology with plan to rule out myeloma and obtain bone marrow aspiration biopsy as well as a metastatic bone survey along with additional labwork  6/18/2019 Underwent bone marrow aspiration  and biopsy  6/24/2019 Seen again by Dr. Walker and CBC showed a hemoglobin of 9.3, M spike 7.3 with monoclonal IgG immunoglobulin of kappa light chain type; serum viscosity was 2.9; quantitative immunoglobulins showed an IgG of 8160, beta-2 microglobulin was 5.8, BUN was 21 with creatinine is 0.8 and total protein was 13.7.  Quantitative kappa/lambda free light chains showed an elevated ratio of 17.0 bone marrow aspiration biopsy showed plasma cell myeloma with approximately 80% plasma cells.  Immunofixation showed IgG kappa and flow cytometry revealed kappa monotypic plasma cells consistent with clonal plasma cell neoplasm and FISH panel was pending at that time.  It was felt she had at least stage II disease based on her beta-2 microglobulin and anemia.  Plan was to treat with Velcade 1.3 mg per/m2 days 1, 4, 8 and 11/Decadron 40 mg on days 1, 8 and 15. Initiation of Revlimid with C2 at 25 mg daily days 1 through 14.  Plan was to also obtain an MRI of the lumbar spine to rule out lytic lesion at L3.  She was initiated on Zovirax 400 mg p.o. twice daily.  6/25/2019 C1 of chemotherapy initiated  7/1/2019 Note in chart regarding patient's large co-pay for the Revlimid and no plan at this point to initiate Revlimid and treat only with Velcade and Decadron per Dr. Walker  7/11/2019 MRI lumbar spine shows a pathologic superior endplate compression fracture at L3 without evidence of retropulsed fragment and innumerable enhancing lesions throughout the lumbar spine consistent with history of multiple myeloma.  9/10/2019 Increasing M-spike and kappa lambda ratio  10/1/2019 Initiation of CyBorD  11/17/2021 Stabilization of M-spike at 1.2  12/1/2020 Initiation of Darzalex Faspro injection  3/23/2021 M-spike increased form 1.0 to 1.2 and velcade and dexamethasone added to plan     Current Chemo Regime/TX:  Darzalex faspro injection 1800mg subcutaneous per protocol on Day 1 with velcade 1.3mg/m2 on days 1, 4, 8, 11 with  "weekly Dexamethasone, given every 21 days      **Zometa 4mg every 3 months  Current Cycle: 18 day 1 (Velcade & Dex added C5)  # of completed cycles:  17     Previous treatment:   Velcade 1.3 mg/m2 days 1, 4, 8 and 11 with Decadron 40 mg days 1, 8 and 15 x 4 cycles;   Velcade 1.5mg.m2/cyclophosphamide 150mg every 7 days on days 1,8,15 and 22/decadron 40mg days 1,8,15,22 ; Darzalex faspro injection 1800mg subcutaneous per protocol    Past Medical History:   Diagnosis Date     Arthritis      Depressive disorder      Diabetes mellitus, type 2 (H) 1/18/2021     Essential hypertension 10/1/2015     Major depressive disorder, recurrent episode, mild (H) 10/1/2015     Mixed hyperlipidemia 10/1/2015     Multiple myeloma not having achieved remission (H) 6/24/2019     Other specified disorders of bladder 07/09/2012    Irritable Bladder     Seasonal allergies 10/1/2015     Unspecified essential hypertension 03/19/2007     Unspecified sinusitis (chronic) 09/05/2007       Past Surgical History:   Procedure Laterality Date     APPENDECTOMY       BONE MARROW BIOPSY, BONE SPECIMEN, NEEDLE/TROCAR N/A 6/18/2019    Procedure: BONE MARROW BIOPSY;  Surgeon: Maciej Sanz MD;  Location: [facility] OR     CHOLECYSTECTOMY       COLONOSCOPY  07-    repeat 10 years     COLONOSCOPY N/A 12/30/2016    Procedure: COLONOSCOPY;  Surgeon: Bhaskar Franklin DO;  Location: [facility] OR     SINUS SURGERY       TUBAL STERILIZATION         Family History   Problem Relation Age of Onset     Breast Cancer Mother 66        Cause of Death     Parkinsonism Father         \"Possible\"     Coronary Artery Disease Father      Pacemaker Father      Thyroid Disease Daughter      Diabetes No family hx of      Hypertension No family hx of      Hyperlipidemia No family hx of      Cerebrovascular Disease No family hx of      Colon Cancer No family hx of      Prostate Cancer No family hx of      Genetic Disorder No family hx of      Asthma No family hx of      Anesthesia " Reaction No family hx of        Social History     Socioeconomic History     Marital status:      Spouse name: Not on file     Number of children: Not on file     Years of education: Not on file     Highest education level: Not on file   Occupational History     Occupation: Financial     Comment:  - (FT)   Tobacco Use     Smoking status: Never Smoker     Smokeless tobacco: Never Used     Tobacco comment: Tried to Quit (YES); QUIT in 1971; Passive Exposure (NO)   Substance and Sexual Activity     Alcohol use: Yes     Comment: RARELY     Drug use: No     Sexual activity: Yes     Partners: Male     Birth control/protection: None   Other Topics Concern      Service Not Asked     Blood Transfusions Not Asked     Caffeine Concern Yes     Comment: Coffee >6 cups daily     Occupational Exposure Not Asked     Hobby Hazards Not Asked     Sleep Concern Not Asked     Stress Concern Not Asked     Weight Concern Not Asked     Special Diet Not Asked     Back Care Not Asked     Exercise Not Asked     Bike Helmet Not Asked     Seat Belt Not Asked     Self-Exams Not Asked     Parent/sibling w/ CABG, MI or angioplasty before 65F 55M? No   Social History Narrative     Not on file     Social Determinants of Health     Financial Resource Strain: Not on file   Food Insecurity: Not on file   Transportation Needs: Not on file   Physical Activity: Not on file   Stress: Not on file   Social Connections: Not on file   Intimate Partner Violence: Not on file   Housing Stability: Not on file       Current Outpatient Medications   Medication     acyclovir (ZOVIRAX) 400 MG tablet     aspirin (ASA) 81 MG chewable tablet     atorvastatin (LIPITOR) 10 MG tablet     dexamethasone (DECADRON) 4 MG tablet     EPINEPHrine (EPIPEN) 0.3 MG/0.3ML injection     fluticasone (FLONASE) 50 MCG/ACT nasal spray     L-Lysine HCl 500 MG CAPS     losartan (COZAAR) 50 MG tablet     prochlorperazine (COMPAZINE) 10 MG tablet     sertraline  "(ZOLOFT) 50 MG tablet     No current facility-administered medications for this visit.     Facility-Administered Medications Ordered in Other Visits   Medication     bortezomib (VELCADE) subcutaneous injection 2.4 mg     daratumumab-hyaluronidase-fihj (DARZALEX FASPRO) SUBCUTANEOUS injection 1,800 mg        Allergies   Allergen Reactions     Lisinopril Cough     Phenylephrine Hcl Other (See Comments)     **Entex  HEADACHE (SEVERE)     Phenylpropanolamine Other (See Comments)     **Entex  HEADACHE (SEVERE)     Pseudoephedrine Tannate Other (See Comments)     **Entex  HEADACHE (SEVERE)     Levofloxacin Rash     **Levaquin     Moxifloxacin Hcl [Avelox] Rash       Review Of Systems:  A complete review of systems is negative except for the above mentioned items in the interval history.     ECOG Performance Status: 0    Physical Exam:  /81   Pulse 87   Temp 99.2  F (37.3  C) (Tympanic)   Resp 20   Ht 1.6 m (5' 3\")   Wt 75.2 kg (165 lb 12.6 oz)   SpO2 95%   BMI 29.37 kg/m    GENERAL APPEARANCE: Healthy, alert and in no acute distress.  HEENT: Eyes appear normal without scleral icterus. Extraocular movements intact. Mouth appears normal without lesions, ulcers, or thrush.  NECK:   Supple with normal range of motion. No asymmetry or masses.  LYMPHATICS: No palpable cervical, supraclavicular, axillary nodes.  RESP: Lungs clear to auscultation bilaterally, respirations regular and easy.  CARDIOVASCULAR: Regular rate and rhythm. Normal S1, S2; no murmur, gallop, or rub.  ABDOMEN: Soft, non-tender, non-distended. Bowel sounds auscultated all 4 quadrants. No palpable organomegaly or masses.  MUSCULOSKELETAL: Extremities without gross deformities noted. No edema of bilateral lower extremities.  SKIN: No suspicious lesions or rashes.  NEURO: Alert and oriented x 3.  Gait steady.  PSYCHIATRIC: Mentation and affect appear normal.  Mood appropriate.    Laboratory: Myeloma studies drawn 12/21. M Neo stable at 0.3. "   Results for orders placed or performed in visit on 12/27/21   Comprehensive metabolic panel     Status: Abnormal   Result Value Ref Range    Sodium 137 133 - 144 mmol/L    Potassium 4.0 3.4 - 5.3 mmol/L    Chloride 107 94 - 109 mmol/L    Carbon Dioxide (CO2) 25 20 - 32 mmol/L    Anion Gap 5 3 - 14 mmol/L    Urea Nitrogen 17 7 - 30 mg/dL    Creatinine 0.75 0.52 - 1.04 mg/dL    Calcium 9.2 8.5 - 10.1 mg/dL    Glucose 107 (H) 70 - 99 mg/dL    Alkaline Phosphatase 102 40 - 150 U/L    AST 11 0 - 45 U/L    ALT 17 0 - 50 U/L    Protein Total 7.0 6.8 - 8.8 g/dL    Albumin 3.4 3.4 - 5.0 g/dL    Bilirubin Total 0.4 0.2 - 1.3 mg/dL    GFR Estimate 84 >60 mL/min/1.73m2   CBC with platelets and differential     Status: Abnormal   Result Value Ref Range    WBC Count 6.6 4.0 - 11.0 10e3/uL    RBC Count 4.29 3.80 - 5.20 10e6/uL    Hemoglobin 13.1 11.7 - 15.7 g/dL    Hematocrit 38.7 35.0 - 47.0 %    MCV 90 78 - 100 fL    MCH 30.5 26.5 - 33.0 pg    MCHC 33.9 31.5 - 36.5 g/dL    RDW 15.5 (H) 10.0 - 15.0 %    Platelet Count 263 150 - 450 10e3/uL    % Neutrophils 63 %    % Lymphocytes 18 %    % Monocytes 11 %    % Eosinophils 6 %    % Basophils 1 %    % Immature Granulocytes 1 %    NRBCs per 100 WBC 0 <1 /100    Absolute Neutrophils 4.1 1.6 - 8.3 10e3/uL    Absolute Lymphocytes 1.2 0.8 - 5.3 10e3/uL    Absolute Monocytes 0.7 0.0 - 1.3 10e3/uL    Absolute Eosinophils 0.4 0.0 - 0.7 10e3/uL    Absolute Basophils 0.0 0.0 - 0.2 10e3/uL    Absolute Immature Granulocytes 0.1 <=0.4 10e3/uL    Absolute NRBCs 0.0 10e3/uL   CBC with platelets differential     Status: Abnormal    Narrative    The following orders were created for panel order CBC with platelets differential.  Procedure                               Abnormality         Status                     ---------                               -----------         ------                     CBC with platelets and d...[[id number]]  Abnormal            Final result                 Please view  results for these tests on the individual orders.       Imaging Studies:  None this visit.     ASSESSMENT/PLAN:  #1 Multiple myeloma:   IgG kappa multiple myeloma with 80% involvement of the bone marrow diagnosed June 2019 initially treated with Velcade and Decadron and received 4 cycles with increasing M-spike and kappa/lambda ratio.  Treatment changed to CyBorD on 10/1/2019.  M-spike plateau at 1.2 and treatment changed to monotherapy with Darzalex Faspro injection. M spike declined to 1.0 then increased again to 1.2 so Velcade and Dex added to her treatment plan with cycle 5 therapy. Today, her M Neo is noted to be stable at 0.3. We will therefore continue with her next cycle of Darzalex, Velcade, and Dex without changes. Refill of Dex sent to her local pharmacy. Plan on following-up in 3 weeks, sooner with concerns.      #2 Lytic lesions: Continue with the Zometa every 3 months. Last given 12/14/2021 so due again March 2022. She remains on calcium with vitamin D twice a day.    Patient in agreement with plan and verbalizes understanding. Agrees to call with any questions or concerns.    55 minutes spent in the patient's encounter today with time spent in review of patient's chart along with chart preparation and review of the treatment plan and signing of treatment plan.  Time was also spent with the patient in obtaining a review of systems and performing a physical exam along with detailed review of all test results. Time was also spent in discussing plan for future follow-up and relating instructions for follow-up and in placing future orders.    FLO Duque, VAMSIP-C

## 2021-12-21 ENCOUNTER — LAB (OUTPATIENT)
Dept: LAB | Facility: OTHER | Age: 71
End: 2021-12-21
Attending: INTERNAL MEDICINE
Payer: MEDICARE

## 2021-12-21 DIAGNOSIS — C90.00 MULTIPLE MYELOMA NOT HAVING ACHIEVED REMISSION (H): ICD-10-CM

## 2021-12-21 PROCEDURE — 36415 COLL VENOUS BLD VENIPUNCTURE: CPT | Mod: ZL

## 2021-12-21 PROCEDURE — 83883 ASSAY NEPHELOMETRY NOT SPEC: CPT | Mod: ZL

## 2021-12-21 PROCEDURE — 84165 PROTEIN E-PHORESIS SERUM: CPT | Mod: ZL | Performed by: PATHOLOGY

## 2021-12-21 PROCEDURE — 82784 ASSAY IGA/IGD/IGG/IGM EACH: CPT | Mod: ZL

## 2021-12-21 PROCEDURE — 86335 IMMUNFIX E-PHORSIS/URINE/CSF: CPT | Mod: ZL

## 2021-12-21 PROCEDURE — 84155 ASSAY OF PROTEIN SERUM: CPT | Mod: ZL

## 2021-12-21 PROCEDURE — 86334 IMMUNOFIX E-PHORESIS SERUM: CPT | Mod: ZL

## 2021-12-22 ENCOUNTER — HOSPITAL ENCOUNTER (OUTPATIENT)
Dept: PHYSICAL THERAPY | Facility: HOSPITAL | Age: 71
Setting detail: THERAPIES SERIES
End: 2021-12-22
Attending: NURSE PRACTITIONER
Payer: MEDICARE

## 2021-12-22 LAB — TOTAL PROTEIN SERUM FOR ELP: 6.8 G/DL (ref 6.8–8.8)

## 2021-12-22 PROCEDURE — 97110 THERAPEUTIC EXERCISES: CPT | Mod: GP

## 2021-12-23 LAB
ALBUMIN SERPL ELPH-MCNC: 4.1 G/DL (ref 3.7–5.1)
ALPHA1 GLOB SERPL ELPH-MCNC: 0.4 G/DL (ref 0.2–0.4)
ALPHA2 GLOB SERPL ELPH-MCNC: 0.9 G/DL (ref 0.5–0.9)
B-GLOBULIN SERPL ELPH-MCNC: 0.7 G/DL (ref 0.6–1)
GAMMA GLOB SERPL ELPH-MCNC: 0.8 G/DL (ref 0.7–1.6)
IGA SERPL-MCNC: 11 MG/DL (ref 84–499)
IGG SERPL-MCNC: 812 MG/DL (ref 610–1616)
IGM SERPL-MCNC: 14 MG/DL (ref 35–242)
KAPPA LC FREE SER-MCNC: 0.5 MG/DL (ref 0.33–1.94)
KAPPA LC FREE/LAMBDA FREE SER NEPH: 1.92 {RATIO} (ref 0.26–1.65)
LAMBDA LC FREE SERPL-MCNC: 0.26 MG/DL (ref 0.57–2.63)
M PROTEIN SERPL ELPH-MCNC: 0.3 G/DL
PROT ELPH PNL UR ELPH: NORMAL
PROT PATTERN SERPL ELPH-IMP: ABNORMAL
PROT PATTERN SERPL IFE-IMP: NORMAL

## 2021-12-23 PROCEDURE — 84165 PROTEIN E-PHORESIS SERUM: CPT | Mod: 26 | Performed by: PATHOLOGY

## 2021-12-23 PROCEDURE — 86335 IMMUNFIX E-PHORSIS/URINE/CSF: CPT | Performed by: PATHOLOGY

## 2021-12-23 PROCEDURE — 86334 IMMUNOFIX E-PHORESIS SERUM: CPT | Mod: 59 | Performed by: PATHOLOGY

## 2021-12-27 ENCOUNTER — INFUSION THERAPY VISIT (OUTPATIENT)
Dept: INFUSION THERAPY | Facility: OTHER | Age: 71
End: 2021-12-27
Attending: INTERNAL MEDICINE
Payer: MEDICARE

## 2021-12-27 ENCOUNTER — ONCOLOGY VISIT (OUTPATIENT)
Dept: ONCOLOGY | Facility: OTHER | Age: 71
End: 2021-12-27
Attending: NURSE PRACTITIONER
Payer: MEDICARE

## 2021-12-27 VITALS
DIASTOLIC BLOOD PRESSURE: 81 MMHG | SYSTOLIC BLOOD PRESSURE: 135 MMHG | HEIGHT: 63 IN | HEART RATE: 87 BPM | OXYGEN SATURATION: 95 % | WEIGHT: 165.79 LBS | RESPIRATION RATE: 20 BRPM | TEMPERATURE: 99.2 F | BODY MASS INDEX: 29.38 KG/M2

## 2021-12-27 DIAGNOSIS — C90.00 MULTIPLE MYELOMA NOT HAVING ACHIEVED REMISSION (H): Primary | ICD-10-CM

## 2021-12-27 LAB
ALBUMIN SERPL-MCNC: 3.4 G/DL (ref 3.4–5)
ALP SERPL-CCNC: 102 U/L (ref 40–150)
ALT SERPL W P-5'-P-CCNC: 17 U/L (ref 0–50)
ANION GAP SERPL CALCULATED.3IONS-SCNC: 5 MMOL/L (ref 3–14)
AST SERPL W P-5'-P-CCNC: 11 U/L (ref 0–45)
BASOPHILS # BLD AUTO: 0 10E3/UL (ref 0–0.2)
BASOPHILS NFR BLD AUTO: 1 %
BILIRUB SERPL-MCNC: 0.4 MG/DL (ref 0.2–1.3)
BUN SERPL-MCNC: 17 MG/DL (ref 7–30)
CALCIUM SERPL-MCNC: 9.2 MG/DL (ref 8.5–10.1)
CHLORIDE BLD-SCNC: 107 MMOL/L (ref 94–109)
CO2 SERPL-SCNC: 25 MMOL/L (ref 20–32)
CREAT SERPL-MCNC: 0.75 MG/DL (ref 0.52–1.04)
EOSINOPHIL # BLD AUTO: 0.4 10E3/UL (ref 0–0.7)
EOSINOPHIL NFR BLD AUTO: 6 %
ERYTHROCYTE [DISTWIDTH] IN BLOOD BY AUTOMATED COUNT: 15.5 % (ref 10–15)
GFR SERPL CREATININE-BSD FRML MDRD: 84 ML/MIN/1.73M2
GLUCOSE BLD-MCNC: 107 MG/DL (ref 70–99)
HCT VFR BLD AUTO: 38.7 % (ref 35–47)
HGB BLD-MCNC: 13.1 G/DL (ref 11.7–15.7)
IMM GRANULOCYTES # BLD: 0.1 10E3/UL
IMM GRANULOCYTES NFR BLD: 1 %
LYMPHOCYTES # BLD AUTO: 1.2 10E3/UL (ref 0.8–5.3)
LYMPHOCYTES NFR BLD AUTO: 18 %
MCH RBC QN AUTO: 30.5 PG (ref 26.5–33)
MCHC RBC AUTO-ENTMCNC: 33.9 G/DL (ref 31.5–36.5)
MCV RBC AUTO: 90 FL (ref 78–100)
MONOCYTES # BLD AUTO: 0.7 10E3/UL (ref 0–1.3)
MONOCYTES NFR BLD AUTO: 11 %
NEUTROPHILS # BLD AUTO: 4.1 10E3/UL (ref 1.6–8.3)
NEUTROPHILS NFR BLD AUTO: 63 %
NRBC # BLD AUTO: 0 10E3/UL
NRBC BLD AUTO-RTO: 0 /100
PLATELET # BLD AUTO: 263 10E3/UL (ref 150–450)
POTASSIUM BLD-SCNC: 4 MMOL/L (ref 3.4–5.3)
PROT SERPL-MCNC: 7 G/DL (ref 6.8–8.8)
RBC # BLD AUTO: 4.29 10E6/UL (ref 3.8–5.2)
SODIUM SERPL-SCNC: 137 MMOL/L (ref 133–144)
WBC # BLD AUTO: 6.6 10E3/UL (ref 4–11)

## 2021-12-27 PROCEDURE — G0463 HOSPITAL OUTPT CLINIC VISIT: HCPCS | Mod: 25

## 2021-12-27 PROCEDURE — G0463 HOSPITAL OUTPT CLINIC VISIT: HCPCS

## 2021-12-27 PROCEDURE — 99215 OFFICE O/P EST HI 40 MIN: CPT | Performed by: NURSE PRACTITIONER

## 2021-12-27 PROCEDURE — 82040 ASSAY OF SERUM ALBUMIN: CPT | Mod: ZL | Performed by: INTERNAL MEDICINE

## 2021-12-27 PROCEDURE — 250N000013 HC RX MED GY IP 250 OP 250 PS 637: Performed by: INTERNAL MEDICINE

## 2021-12-27 PROCEDURE — 250N000011 HC RX IP 250 OP 636: Performed by: NURSE PRACTITIONER

## 2021-12-27 PROCEDURE — 96413 CHEMO IV INFUSION 1 HR: CPT

## 2021-12-27 PROCEDURE — 96401 CHEMO ANTI-NEOPL SQ/IM: CPT

## 2021-12-27 PROCEDURE — 36415 COLL VENOUS BLD VENIPUNCTURE: CPT | Mod: ZL | Performed by: INTERNAL MEDICINE

## 2021-12-27 PROCEDURE — 85004 AUTOMATED DIFF WBC COUNT: CPT | Mod: ZL | Performed by: INTERNAL MEDICINE

## 2021-12-27 RX ORDER — LORAZEPAM 2 MG/ML
0.5 INJECTION INTRAMUSCULAR EVERY 4 HOURS PRN
Status: CANCELLED | OUTPATIENT
Start: 2022-01-04

## 2021-12-27 RX ORDER — MEPERIDINE HYDROCHLORIDE 25 MG/ML
25 INJECTION INTRAMUSCULAR; INTRAVENOUS; SUBCUTANEOUS EVERY 30 MIN PRN
Status: CANCELLED | OUTPATIENT
Start: 2021-12-28

## 2021-12-27 RX ORDER — ALBUTEROL SULFATE 0.83 MG/ML
2.5 SOLUTION RESPIRATORY (INHALATION)
Status: CANCELLED | OUTPATIENT
Start: 2021-12-30

## 2021-12-27 RX ORDER — HEPARIN SODIUM,PORCINE 10 UNIT/ML
5 VIAL (ML) INTRAVENOUS
Status: CANCELLED | OUTPATIENT
Start: 2021-12-28

## 2021-12-27 RX ORDER — SODIUM CHLORIDE 9 MG/ML
1000 INJECTION, SOLUTION INTRAVENOUS CONTINUOUS PRN
Status: CANCELLED
Start: 2021-12-28

## 2021-12-27 RX ORDER — NALOXONE HYDROCHLORIDE 0.4 MG/ML
.1-.4 INJECTION, SOLUTION INTRAMUSCULAR; INTRAVENOUS; SUBCUTANEOUS
Status: CANCELLED | OUTPATIENT
Start: 2021-12-28

## 2021-12-27 RX ORDER — HEPARIN SODIUM (PORCINE) LOCK FLUSH IV SOLN 100 UNIT/ML 100 UNIT/ML
5 SOLUTION INTRAVENOUS
Status: CANCELLED | OUTPATIENT
Start: 2022-01-04

## 2021-12-27 RX ORDER — METHYLPREDNISOLONE SODIUM SUCCINATE 125 MG/2ML
125 INJECTION, POWDER, LYOPHILIZED, FOR SOLUTION INTRAMUSCULAR; INTRAVENOUS
Status: CANCELLED
Start: 2022-01-04

## 2021-12-27 RX ORDER — METHYLPREDNISOLONE SODIUM SUCCINATE 125 MG/2ML
125 INJECTION, POWDER, LYOPHILIZED, FOR SOLUTION INTRAMUSCULAR; INTRAVENOUS
Status: CANCELLED
Start: 2021-12-30

## 2021-12-27 RX ORDER — DIPHENHYDRAMINE HYDROCHLORIDE 50 MG/ML
50 INJECTION INTRAMUSCULAR; INTRAVENOUS
Status: CANCELLED
Start: 2022-01-07

## 2021-12-27 RX ORDER — NALOXONE HYDROCHLORIDE 0.4 MG/ML
.1-.4 INJECTION, SOLUTION INTRAMUSCULAR; INTRAVENOUS; SUBCUTANEOUS
Status: CANCELLED | OUTPATIENT
Start: 2021-12-30

## 2021-12-27 RX ORDER — ALBUTEROL SULFATE 90 UG/1
1-2 AEROSOL, METERED RESPIRATORY (INHALATION)
Status: CANCELLED
Start: 2021-12-30

## 2021-12-27 RX ORDER — DEXAMETHASONE 4 MG/1
TABLET ORAL
Qty: 20 TABLET | Refills: 4 | Status: SHIPPED | OUTPATIENT
Start: 2021-12-27 | End: 2022-03-01

## 2021-12-27 RX ORDER — HEPARIN SODIUM (PORCINE) LOCK FLUSH IV SOLN 100 UNIT/ML 100 UNIT/ML
5 SOLUTION INTRAVENOUS
Status: CANCELLED | OUTPATIENT
Start: 2021-12-30

## 2021-12-27 RX ORDER — LORAZEPAM 2 MG/ML
0.5 INJECTION INTRAMUSCULAR EVERY 4 HOURS PRN
Status: CANCELLED | OUTPATIENT
Start: 2021-12-28

## 2021-12-27 RX ORDER — ALBUTEROL SULFATE 0.83 MG/ML
2.5 SOLUTION RESPIRATORY (INHALATION)
Status: CANCELLED | OUTPATIENT
Start: 2021-12-28

## 2021-12-27 RX ORDER — ALBUTEROL SULFATE 90 UG/1
1-2 AEROSOL, METERED RESPIRATORY (INHALATION)
Status: CANCELLED
Start: 2021-12-28

## 2021-12-27 RX ORDER — SODIUM CHLORIDE 9 MG/ML
1000 INJECTION, SOLUTION INTRAVENOUS CONTINUOUS PRN
Status: CANCELLED
Start: 2022-01-04

## 2021-12-27 RX ORDER — HEPARIN SODIUM,PORCINE 10 UNIT/ML
5 VIAL (ML) INTRAVENOUS
Status: CANCELLED | OUTPATIENT
Start: 2022-01-04

## 2021-12-27 RX ORDER — EPINEPHRINE 1 MG/ML
0.3 INJECTION, SOLUTION, CONCENTRATE INTRAVENOUS EVERY 5 MIN PRN
Status: CANCELLED | OUTPATIENT
Start: 2022-01-04

## 2021-12-27 RX ORDER — SODIUM CHLORIDE 9 MG/ML
1000 INJECTION, SOLUTION INTRAVENOUS CONTINUOUS PRN
Status: CANCELLED
Start: 2021-12-30

## 2021-12-27 RX ORDER — DIPHENHYDRAMINE HYDROCHLORIDE 50 MG/ML
50 INJECTION INTRAMUSCULAR; INTRAVENOUS
Status: CANCELLED
Start: 2022-01-04

## 2021-12-27 RX ORDER — MEPERIDINE HYDROCHLORIDE 25 MG/ML
25 INJECTION INTRAMUSCULAR; INTRAVENOUS; SUBCUTANEOUS EVERY 30 MIN PRN
Status: CANCELLED | OUTPATIENT
Start: 2022-01-07

## 2021-12-27 RX ORDER — SODIUM CHLORIDE 9 MG/ML
1000 INJECTION, SOLUTION INTRAVENOUS CONTINUOUS PRN
Status: CANCELLED
Start: 2022-01-07

## 2021-12-27 RX ORDER — METHYLPREDNISOLONE SODIUM SUCCINATE 125 MG/2ML
125 INJECTION, POWDER, LYOPHILIZED, FOR SOLUTION INTRAMUSCULAR; INTRAVENOUS
Status: CANCELLED
Start: 2022-01-07

## 2021-12-27 RX ORDER — ACETAMINOPHEN 325 MG/1
650 TABLET ORAL ONCE
Status: COMPLETED | OUTPATIENT
Start: 2021-12-27 | End: 2021-12-27

## 2021-12-27 RX ORDER — ALBUTEROL SULFATE 0.83 MG/ML
2.5 SOLUTION RESPIRATORY (INHALATION)
Status: CANCELLED | OUTPATIENT
Start: 2022-01-07

## 2021-12-27 RX ORDER — ALBUTEROL SULFATE 0.83 MG/ML
2.5 SOLUTION RESPIRATORY (INHALATION)
Status: CANCELLED | OUTPATIENT
Start: 2022-01-04

## 2021-12-27 RX ORDER — ALBUTEROL SULFATE 90 UG/1
1-2 AEROSOL, METERED RESPIRATORY (INHALATION)
Status: CANCELLED
Start: 2022-01-07

## 2021-12-27 RX ORDER — DIPHENHYDRAMINE HCL 50 MG
50 CAPSULE ORAL ONCE
Status: COMPLETED | OUTPATIENT
Start: 2021-12-27 | End: 2021-12-27

## 2021-12-27 RX ORDER — MEPERIDINE HYDROCHLORIDE 25 MG/ML
25 INJECTION INTRAMUSCULAR; INTRAVENOUS; SUBCUTANEOUS EVERY 30 MIN PRN
Status: CANCELLED | OUTPATIENT
Start: 2022-01-04

## 2021-12-27 RX ORDER — METHYLPREDNISOLONE SODIUM SUCCINATE 125 MG/2ML
125 INJECTION, POWDER, LYOPHILIZED, FOR SOLUTION INTRAMUSCULAR; INTRAVENOUS
Status: CANCELLED
Start: 2021-12-28

## 2021-12-27 RX ORDER — LORAZEPAM 2 MG/ML
0.5 INJECTION INTRAMUSCULAR EVERY 4 HOURS PRN
Status: CANCELLED | OUTPATIENT
Start: 2022-01-07

## 2021-12-27 RX ORDER — DIPHENHYDRAMINE HYDROCHLORIDE 50 MG/ML
50 INJECTION INTRAMUSCULAR; INTRAVENOUS
Status: CANCELLED
Start: 2021-12-30

## 2021-12-27 RX ORDER — MEPERIDINE HYDROCHLORIDE 25 MG/ML
25 INJECTION INTRAMUSCULAR; INTRAVENOUS; SUBCUTANEOUS EVERY 30 MIN PRN
Status: CANCELLED | OUTPATIENT
Start: 2021-12-30

## 2021-12-27 RX ORDER — EPINEPHRINE 1 MG/ML
0.3 INJECTION, SOLUTION, CONCENTRATE INTRAVENOUS EVERY 5 MIN PRN
Status: CANCELLED | OUTPATIENT
Start: 2022-01-07

## 2021-12-27 RX ORDER — HEPARIN SODIUM (PORCINE) LOCK FLUSH IV SOLN 100 UNIT/ML 100 UNIT/ML
5 SOLUTION INTRAVENOUS
Status: CANCELLED | OUTPATIENT
Start: 2021-12-28

## 2021-12-27 RX ORDER — NALOXONE HYDROCHLORIDE 0.4 MG/ML
.1-.4 INJECTION, SOLUTION INTRAMUSCULAR; INTRAVENOUS; SUBCUTANEOUS
Status: CANCELLED | OUTPATIENT
Start: 2022-01-07

## 2021-12-27 RX ORDER — LORAZEPAM 2 MG/ML
0.5 INJECTION INTRAMUSCULAR EVERY 4 HOURS PRN
Status: CANCELLED | OUTPATIENT
Start: 2021-12-30

## 2021-12-27 RX ORDER — DIPHENHYDRAMINE HYDROCHLORIDE 50 MG/ML
50 INJECTION INTRAMUSCULAR; INTRAVENOUS
Status: CANCELLED
Start: 2021-12-28

## 2021-12-27 RX ORDER — EPINEPHRINE 1 MG/ML
0.3 INJECTION, SOLUTION, CONCENTRATE INTRAVENOUS EVERY 5 MIN PRN
Status: CANCELLED | OUTPATIENT
Start: 2021-12-28

## 2021-12-27 RX ORDER — NALOXONE HYDROCHLORIDE 0.4 MG/ML
.1-.4 INJECTION, SOLUTION INTRAMUSCULAR; INTRAVENOUS; SUBCUTANEOUS
Status: CANCELLED | OUTPATIENT
Start: 2022-01-04

## 2021-12-27 RX ORDER — HEPARIN SODIUM,PORCINE 10 UNIT/ML
5 VIAL (ML) INTRAVENOUS
Status: CANCELLED | OUTPATIENT
Start: 2022-01-07

## 2021-12-27 RX ORDER — HEPARIN SODIUM,PORCINE 10 UNIT/ML
5 VIAL (ML) INTRAVENOUS
Status: CANCELLED | OUTPATIENT
Start: 2021-12-30

## 2021-12-27 RX ORDER — EPINEPHRINE 1 MG/ML
0.3 INJECTION, SOLUTION, CONCENTRATE INTRAVENOUS EVERY 5 MIN PRN
Status: CANCELLED | OUTPATIENT
Start: 2021-12-30

## 2021-12-27 RX ORDER — ALBUTEROL SULFATE 90 UG/1
1-2 AEROSOL, METERED RESPIRATORY (INHALATION)
Status: CANCELLED
Start: 2022-01-04

## 2021-12-27 RX ORDER — HEPARIN SODIUM (PORCINE) LOCK FLUSH IV SOLN 100 UNIT/ML 100 UNIT/ML
5 SOLUTION INTRAVENOUS
Status: CANCELLED | OUTPATIENT
Start: 2022-01-07

## 2021-12-27 RX ADMIN — DARATUMUMAB AND HYALURONIDASE-FIHJ (HUMAN RECOMBINANT) 1800 MG: 1800; 30000 INJECTION SUBCUTANEOUS at 14:29

## 2021-12-27 RX ADMIN — DIPHENHYDRAMINE HYDROCHLORIDE 50 MG: 50 CAPSULE ORAL at 13:23

## 2021-12-27 RX ADMIN — BORTEZOMIB 2.4 MG: 3.5 INJECTION, POWDER, LYOPHILIZED, FOR SOLUTION INTRAVENOUS; SUBCUTANEOUS at 14:16

## 2021-12-27 RX ADMIN — ACETAMINOPHEN 650 MG: 325 TABLET, FILM COATED ORAL at 13:23

## 2021-12-27 ASSESSMENT — PAIN SCALES - GENERAL: PAINLEVEL: NO PAIN (0)

## 2021-12-27 ASSESSMENT — MIFFLIN-ST. JEOR: SCORE: 1226.13

## 2021-12-27 NOTE — NURSING NOTE
"Oncology Rooming Note    December 27, 2021 12:33 PM   Carmelita Watts is a 71 year old female who presents for:    Chief Complaint   Patient presents with     Oncology Clinic Visit     follow up. Multiple myeloma not having achieved remission      Initial Vitals: /81   Pulse 87   Temp 99.2  F (37.3  C) (Tympanic)   Resp 20   Ht 1.6 m (5' 3\")   Wt 75.2 kg (165 lb 12.6 oz)   SpO2 95%   BMI 29.37 kg/m   Estimated body mass index is 29.37 kg/m  as calculated from the following:    Height as of this encounter: 1.6 m (5' 3\").    Weight as of this encounter: 75.2 kg (165 lb 12.6 oz). Body surface area is 1.83 meters squared.  No Pain (0) Comment: Data Unavailable   No LMP recorded. Patient is postmenopausal.  Allergies reviewed: Yes  Medications reviewed: Yes    Medications: Medication refills not needed today.  Pharmacy name entered into [facility]:    [id number] [place], [place] - [address] [facility] [address] AT [facility] [address]  [facility] - [place], [place] - [address]              "

## 2021-12-27 NOTE — PROGRESS NOTES
Patient is 71  years old, here today accompanied by self for injection of Faspro per order of Bonita Quintana NP under the supervision of Dr. Walker     Patient  lab values are reviewed by this provider.  Labs meet parameters for today's injection.     Patient denies questions nor concerns regarding medication, administration site, side effects, nor aftercare.  Patient identified with two identifiers, order verified, and verbal consent for today's injection obtained from patient.   Patient education provided on s/s of injection site infection, and/or medication specific side effects, and when to call a provider.  Patient instructed to report any adverse effects.     Medication administered per protocol in right upper abdomen at 45 degree angle.  Site covered with sterile bandage.  Patient tolerated injection well, no verbal nor non-verbal signs of discomfort noted.  No adverse effects noted at this time.     Patient instructed to call with further questions or concerns.  Patient states understanding and is in agreement with this plan.  Copy of appointments, and after visit summary (AVS) given to patient. Patient discharged.

## 2021-12-27 NOTE — PROGRESS NOTES
Patient is a 71 year old female her today for injection of Velcade per order of . Patient identified with two identifiers, order verified, and verbal consent for today's infusion obtained from patient. Written consent for treatment is on file and valid.    Recent lab values:  Reviewed and are WNL  Velcade dose verified with Barron RICH RN, prior to release of drug.      Patient denies questions or concerns regarding injection and/or medication(s) being administered.    Velcade injected SQ into 1416 at 45 degree angle per protocol rotating sites. Patient tolerated injection without incident, no signs or symptoms of adverse reaction noted. Patient denies pain or discomfort.     Covered with a sterile bandage. Pt instructed to leave bandage intact for a minimum of one hour, and to call with questions or concerns. Copy of appointments, discharge instructions, and after visit summary (AVS) provided to patient. Patient states understanding, discharged.

## 2021-12-27 NOTE — PATIENT INSTRUCTIONS
Thank you for allowing me to be a part of your care today.    We would like to see you back in 3 weeks, as per schedule. We will check your labs the week prior as scheduled.     If you have any questions please call [phone]    Other instructions:      A refill of your Dexamethasone has been sent to [facility] in [place].

## 2021-12-28 ENCOUNTER — OFFICE VISIT (OUTPATIENT)
Dept: FAMILY MEDICINE | Facility: OTHER | Age: 71
End: 2021-12-28
Attending: NURSE PRACTITIONER
Payer: COMMERCIAL

## 2021-12-28 VITALS
HEIGHT: 63 IN | WEIGHT: 165 LBS | HEART RATE: 88 BPM | DIASTOLIC BLOOD PRESSURE: 80 MMHG | SYSTOLIC BLOOD PRESSURE: 130 MMHG | OXYGEN SATURATION: 98 % | BODY MASS INDEX: 29.23 KG/M2 | TEMPERATURE: 97.9 F

## 2021-12-28 DIAGNOSIS — L08.9 LOCAL INFECTION OF SKIN AND SUBCUTANEOUS TISSUE: Primary | ICD-10-CM

## 2021-12-28 PROCEDURE — 99213 OFFICE O/P EST LOW 20 MIN: CPT | Performed by: NURSE PRACTITIONER

## 2021-12-28 PROCEDURE — G0463 HOSPITAL OUTPT CLINIC VISIT: HCPCS

## 2021-12-28 RX ORDER — CEPHALEXIN 500 MG/1
500 CAPSULE ORAL 3 TIMES DAILY
Qty: 30 CAPSULE | Refills: 0 | Status: SHIPPED | OUTPATIENT
Start: 2021-12-28 | End: 2022-01-18

## 2021-12-28 ASSESSMENT — ENCOUNTER SYMPTOMS
COLOR CHANGE: 1
FEVER: 0
PSYCHIATRIC NEGATIVE: 1
ARTHRALGIAS: 0
PARESTHESIAS: 0
MYALGIAS: 0
NUMBNESS: 0

## 2021-12-28 ASSESSMENT — MIFFLIN-ST. JEOR: SCORE: 1222.57

## 2021-12-28 ASSESSMENT — PAIN SCALES - GENERAL: PAINLEVEL: NO PAIN (0)

## 2021-12-28 NOTE — NURSING NOTE
"Chief Complaint   Patient presents with     lab draw site is red       Initial /80 (BP Location: Left arm, Patient Position: Sitting, Cuff Size: Adult Regular)   Pulse 88   Temp 97.9  F (36.6  C) (Tympanic)   Ht 1.6 m (5' 3\")   Wt 74.8 kg (165 lb)   SpO2 98%   BMI 29.23 kg/m   Estimated body mass index is 29.23 kg/m  as calculated from the following:    Height as of this encounter: 1.6 m (5' 3\").    Weight as of this encounter: 74.8 kg (165 lb).  Medication Reconciliation: complete  Marlyn Loo LPN  "

## 2021-12-28 NOTE — PATIENT INSTRUCTIONS
1. Keflex 500mg 3 times a day for 7 days  2.  Cortisone 10  To outside thinly 2-3 times a day.    3. May be local reaction, but will treat conservatively because of Multiple Myeloma.    4. Keep well hydrated.

## 2021-12-28 NOTE — PROGRESS NOTES
"    Subjective   Carmelita is a 71 year old who presents for the following health issues   HPI     Concern - Lab draw site red left anticubital.    Onset:about  Month ago it started   Description: She had blood drawn lat Tuesday and then had it done 3 weeks prior, always gets her blood drawn on her left arm. Big red [place], does not itch or burns. She does get chemo 2x a week. Has Multiple Myeloma.   Does switch arms weekly.   Intensity: none  Progression of Symptoms: [place] of redness has become more red and larger over time, no line up arm.    Accompanying Signs & Symptoms: redness at lab draw site No itching, pain  Or drainage. No fevers.     Previous history of similar problem: does get reaction to her chemo shots that she does  Precipitating factors:        Worsened by: none  Alleviating factors:        Improved by: none  Therapies tried and outcome: None      /80 (BP Location: Left arm, Patient Position: Sitting, Cuff Size: Adult Regular)   Pulse 88   Temp 97.9  F (36.6  C) (Tympanic)   Ht 1.6 m (5' 3\")   Wt 74.8 kg (165 lb)   SpO2 98%   BMI 29.23 kg/m      Review of Systems   Constitutional: Negative for fever.   Musculoskeletal: Negative for arthralgias and myalgias.   Skin: Positive for color change and rash.        Circular redness to lateral puncture site from lab.     Neurological: Negative for numbness and paresthesias.   Psychiatric/Behavioral: Negative.             Objective    Physical Exam  Constitutional:       General: She is not in acute distress.  Musculoskeletal:         General: No swelling or tenderness. Normal range of motion.   Skin:     General: Skin is warm and dry.      Capillary Refill: Capillary refill takes less than 2 seconds.      Findings: Erythema present.      Comments: 7cm red circular area to lateral side of recent lab puncture site.  Very light red to lower and medial side.  Not swolen or firm. Non tender to palpation.  No drainage from site.  Has several  darker red " lesions to back of left arm from chemo.    Neurological:      General: No focal deficit present.      Mental Status: She is alert and oriented to person, place, and time.      Sensory: No sensory deficit.   Psychiatric:         Mood and Affect: Mood normal.         Behavior: Behavior normal.        ASSESSMENT / PLAN:  (L08.9) Local infection of skin and subcutaneous tissue  (primary encounter diagnosis)  Comment:   Plan:     1. Keflex 500mg 3 times a day for 7 days  2.  Cortisone 10  To outside thinly 2-3 times a day.    3. May be local reaction, but will treat conservatively with antibiotic   because of Multiple Myeloma.    4. Keep well hydrated.    cephALEXin (KEFLEX) 500 MG capsule

## 2021-12-29 ENCOUNTER — HOSPITAL ENCOUNTER (OUTPATIENT)
Dept: PHYSICAL THERAPY | Facility: HOSPITAL | Age: 71
Setting detail: THERAPIES SERIES
End: 2021-12-29
Attending: NURSE PRACTITIONER
Payer: MEDICARE

## 2021-12-29 PROCEDURE — 97110 THERAPEUTIC EXERCISES: CPT | Mod: GP

## 2021-12-30 ENCOUNTER — INFUSION THERAPY VISIT (OUTPATIENT)
Dept: INFUSION THERAPY | Facility: OTHER | Age: 71
End: 2021-12-30
Attending: INTERNAL MEDICINE
Payer: MEDICARE

## 2021-12-30 VITALS
HEART RATE: 91 BPM | SYSTOLIC BLOOD PRESSURE: 125 MMHG | RESPIRATION RATE: 17 BRPM | OXYGEN SATURATION: 96 % | TEMPERATURE: 99 F | BODY MASS INDEX: 29.21 KG/M2 | DIASTOLIC BLOOD PRESSURE: 82 MMHG | WEIGHT: 164.9 LBS

## 2021-12-30 DIAGNOSIS — C90.00 MULTIPLE MYELOMA NOT HAVING ACHIEVED REMISSION (H): Primary | ICD-10-CM

## 2021-12-30 PROCEDURE — 96401 CHEMO ANTI-NEOPL SQ/IM: CPT

## 2021-12-30 PROCEDURE — 250N000011 HC RX IP 250 OP 636: Performed by: NURSE PRACTITIONER

## 2021-12-30 RX ADMIN — BORTEZOMIB 2.4 MG: 3.5 INJECTION, POWDER, LYOPHILIZED, FOR SOLUTION INTRAVENOUS; SUBCUTANEOUS at 13:18

## 2021-12-30 NOTE — PROGRESS NOTES
Patient reports being on antibiotics for skin infection.  Bonita Quintana NP informed and assessed site verbal okay to proceed with Velcade

## 2022-01-01 ENCOUNTER — TRANSFERRED RECORDS (OUTPATIENT)
Dept: HEALTH INFORMATION MANAGEMENT | Facility: CLINIC | Age: 72
End: 2022-01-01

## 2022-01-01 LAB — RETINOPATHY: NORMAL

## 2022-01-04 ENCOUNTER — INFUSION THERAPY VISIT (OUTPATIENT)
Dept: INFUSION THERAPY | Facility: OTHER | Age: 72
End: 2022-01-04
Attending: INTERNAL MEDICINE
Payer: MEDICARE

## 2022-01-04 VITALS
TEMPERATURE: 97.8 F | WEIGHT: 165.34 LBS | HEART RATE: 78 BPM | SYSTOLIC BLOOD PRESSURE: 110 MMHG | BODY MASS INDEX: 29.29 KG/M2 | DIASTOLIC BLOOD PRESSURE: 64 MMHG | OXYGEN SATURATION: 93 % | RESPIRATION RATE: 18 BRPM

## 2022-01-04 DIAGNOSIS — C90.00 MULTIPLE MYELOMA NOT HAVING ACHIEVED REMISSION (H): Primary | ICD-10-CM

## 2022-01-04 LAB
BASOPHILS # BLD AUTO: 0 10E3/UL (ref 0–0.2)
BASOPHILS NFR BLD AUTO: 1 %
EOSINOPHIL # BLD AUTO: 0.1 10E3/UL (ref 0–0.7)
EOSINOPHIL NFR BLD AUTO: 2 %
ERYTHROCYTE [DISTWIDTH] IN BLOOD BY AUTOMATED COUNT: 15.5 % (ref 10–15)
HCT VFR BLD AUTO: 39 % (ref 35–47)
HGB BLD-MCNC: 13.1 G/DL (ref 11.7–15.7)
IMM GRANULOCYTES # BLD: 0.1 10E3/UL
IMM GRANULOCYTES NFR BLD: 2 %
LYMPHOCYTES # BLD AUTO: 0.3 10E3/UL (ref 0.8–5.3)
LYMPHOCYTES NFR BLD AUTO: 9 %
MCH RBC QN AUTO: 30.6 PG (ref 26.5–33)
MCHC RBC AUTO-ENTMCNC: 33.6 G/DL (ref 31.5–36.5)
MCV RBC AUTO: 91 FL (ref 78–100)
MONOCYTES # BLD AUTO: 0.1 10E3/UL (ref 0–1.3)
MONOCYTES NFR BLD AUTO: 3 %
NEUTROPHILS # BLD AUTO: 3 10E3/UL (ref 1.6–8.3)
NEUTROPHILS NFR BLD AUTO: 83 %
NRBC # BLD AUTO: 0 10E3/UL
NRBC BLD AUTO-RTO: 0 /100
PLATELET # BLD AUTO: 199 10E3/UL (ref 150–450)
RBC # BLD AUTO: 4.28 10E6/UL (ref 3.8–5.2)
WBC # BLD AUTO: 3.5 10E3/UL (ref 4–11)

## 2022-01-04 PROCEDURE — 36415 COLL VENOUS BLD VENIPUNCTURE: CPT | Mod: ZL | Performed by: NURSE PRACTITIONER

## 2022-01-04 PROCEDURE — 96401 CHEMO ANTI-NEOPL SQ/IM: CPT

## 2022-01-04 PROCEDURE — 85025 COMPLETE CBC W/AUTO DIFF WBC: CPT | Mod: ZL | Performed by: NURSE PRACTITIONER

## 2022-01-04 PROCEDURE — 250N000011 HC RX IP 250 OP 636: Performed by: NURSE PRACTITIONER

## 2022-01-04 RX ADMIN — BORTEZOMIB 2.4 MG: 3.5 INJECTION, POWDER, LYOPHILIZED, FOR SOLUTION INTRAVENOUS; SUBCUTANEOUS at 13:41

## 2022-01-04 NOTE — PROGRESS NOTES
Patient is 71 years old, here today for injection of Velcade per order of . Patient meets parameters for today's infusion. Patient identified with two identifiers, order verified, and verbal consent for today's infusion obtained from patient. Written consent for treatment is on file and valid.    Component      Latest Ref Rng & Units 1/4/2022   WBC      4.0 - 11.0 10e3/uL 3.5 (L)   RBC Count      3.80 - 5.20 10e6/uL 4.28   Hemoglobin      11.7 - 15.7 g/dL 13.1   Hematocrit      35.0 - 47.0 % 39.0   MCV      78 - 100 fL 91   MCH      26.5 - 33.0 pg 30.6   MCHC      31.5 - 36.5 g/dL 33.6   RDW      10.0 - 15.0 % 15.5 (H)   Platelet Count      150 - 450 10e3/uL 199   % Neutrophils      % 83   % Lymphocytes      % 9   % Monocytes      % 3   % Eosinophils      % 2   % Basophils      % 1   % Immature Granulocytes      % 2   NRBCs per 100 WBC      <1 /100 0   Absolute Neutrophils      1.6 - 8.3 10e3/uL 3.0   Absolute Lymphocytes      0.8 - 5.3 10e3/uL 0.3 (L)   Absolute Monocytes      0.0 - 1.3 10e3/uL 0.1   Absolute Eosinophils      0.0 - 0.7 10e3/uL 0.1   Absolute Basophils      0.0 - 0.2 10e3/uL 0.0   Absolute Immature Granulocytes      <=0.4 10e3/uL 0.1   Absolute NRBCs      10e3/uL 0.0       Patient meets order parameters for today's treatment.    Patient denies questions or concerns regarding injection and/or medication(s) being administered.    Independent dose check completed with [facility].    Velcade injected SQ into left upper outer arm at a 45 degree angle per protocol rotating sites. Patient tolerated injection without incident, no signs or symptoms of adverse reaction noted. Patient denies pain or discomfort.     Covered with a sterile bandage. Pt instructed to leave bandage intact for a minimum of one hour, and to call with questions or concerns. Patient states understanding, discharged.

## 2022-01-05 ENCOUNTER — HOSPITAL ENCOUNTER (OUTPATIENT)
Dept: PHYSICAL THERAPY | Facility: HOSPITAL | Age: 72
Setting detail: THERAPIES SERIES
End: 2022-01-05
Attending: NURSE PRACTITIONER
Payer: MEDICARE

## 2022-01-05 PROCEDURE — 97110 THERAPEUTIC EXERCISES: CPT | Mod: GP

## 2022-01-07 ENCOUNTER — INFUSION THERAPY VISIT (OUTPATIENT)
Dept: INFUSION THERAPY | Facility: OTHER | Age: 72
End: 2022-01-07
Attending: INTERNAL MEDICINE
Payer: MEDICARE

## 2022-01-07 VITALS
WEIGHT: 165.57 LBS | BODY MASS INDEX: 29.34 KG/M2 | SYSTOLIC BLOOD PRESSURE: 113 MMHG | OXYGEN SATURATION: 96 % | HEART RATE: 86 BPM | DIASTOLIC BLOOD PRESSURE: 79 MMHG | RESPIRATION RATE: 18 BRPM | HEIGHT: 63 IN | TEMPERATURE: 98.8 F

## 2022-01-07 DIAGNOSIS — C90.00 MULTIPLE MYELOMA NOT HAVING ACHIEVED REMISSION (H): Primary | ICD-10-CM

## 2022-01-07 LAB
BASOPHILS # BLD AUTO: 0 10E3/UL (ref 0–0.2)
BASOPHILS NFR BLD AUTO: 0 %
EOSINOPHIL # BLD AUTO: 0.2 10E3/UL (ref 0–0.7)
EOSINOPHIL NFR BLD AUTO: 4 %
ERYTHROCYTE [DISTWIDTH] IN BLOOD BY AUTOMATED COUNT: 15.6 % (ref 10–15)
HCT VFR BLD AUTO: 40 % (ref 35–47)
HGB BLD-MCNC: 13.6 G/DL (ref 11.7–15.7)
IMM GRANULOCYTES # BLD: 0.1 10E3/UL
IMM GRANULOCYTES NFR BLD: 2 %
LYMPHOCYTES # BLD AUTO: 1.3 10E3/UL (ref 0.8–5.3)
LYMPHOCYTES NFR BLD AUTO: 24 %
MCH RBC QN AUTO: 30.8 PG (ref 26.5–33)
MCHC RBC AUTO-ENTMCNC: 34 G/DL (ref 31.5–36.5)
MCV RBC AUTO: 91 FL (ref 78–100)
MONOCYTES # BLD AUTO: 0.9 10E3/UL (ref 0–1.3)
MONOCYTES NFR BLD AUTO: 16 %
NEUTROPHILS # BLD AUTO: 2.9 10E3/UL (ref 1.6–8.3)
NEUTROPHILS NFR BLD AUTO: 54 %
NRBC # BLD AUTO: 0 10E3/UL
NRBC BLD AUTO-RTO: 0 /100
PLATELET # BLD AUTO: 177 10E3/UL (ref 150–450)
RBC # BLD AUTO: 4.41 10E6/UL (ref 3.8–5.2)
WBC # BLD AUTO: 5.5 10E3/UL (ref 4–11)

## 2022-01-07 PROCEDURE — 85025 COMPLETE CBC W/AUTO DIFF WBC: CPT | Mod: ZL

## 2022-01-07 PROCEDURE — 36415 COLL VENOUS BLD VENIPUNCTURE: CPT | Mod: ZL

## 2022-01-07 PROCEDURE — 96401 CHEMO ANTI-NEOPL SQ/IM: CPT

## 2022-01-07 PROCEDURE — 250N000011 HC RX IP 250 OP 636: Mod: JW | Performed by: NURSE PRACTITIONER

## 2022-01-07 RX ADMIN — BORTEZOMIB 2.4 MG: 3.5 INJECTION, POWDER, LYOPHILIZED, FOR SOLUTION INTRAVENOUS; SUBCUTANEOUS at 13:04

## 2022-01-07 ASSESSMENT — MIFFLIN-ST. JEOR: SCORE: 1225

## 2022-01-07 NOTE — PATIENT INSTRUCTIONS

## 2022-01-07 NOTE — PROGRESS NOTES
Patient is a 71 year old here today for injection of Velcade per order of . Patient meets parameters for today's infusion. Patient identified with two identifiers, order verified, and verbal consent for today's infusion obtained from patient.    Component      Latest Ref Rng & Units 1/7/2022   WBC      4.0 - 11.0 10e3/uL 5.5   RBC Count      3.80 - 5.20 10e6/uL 4.41   Hemoglobin      11.7 - 15.7 g/dL 13.6   Hematocrit      35.0 - 47.0 % 40.0   MCV      78 - 100 fL 91   MCH      26.5 - 33.0 pg 30.8   MCHC      31.5 - 36.5 g/dL 34.0   RDW      10.0 - 15.0 % 15.6 (H)   Platelet Count      150 - 450 10e3/uL 177   % Neutrophils      % 54   % Lymphocytes      % 24   % Monocytes      % 16   % Eosinophils      % 4   % Basophils      % 0   % Immature Granulocytes      % 2   NRBCs per 100 WBC      <1 /100 0   Absolute Neutrophils      1.6 - 8.3 10e3/uL 2.9   Absolute Lymphocytes      0.8 - 5.3 10e3/uL 1.3   Absolute Monocytes      0.0 - 1.3 10e3/uL 0.9   Absolute Eosinophils      0.0 - 0.7 10e3/uL 0.2   Absolute Basophils      0.0 - 0.2 10e3/uL 0.0   Absolute Immature Granulocytes      <=0.4 10e3/uL 0.1   Absolute NRBCs      10e3/uL 0.0     Independent dose check completed with Christine RODRIGUEZ RN prior to releasing drug.    Patient denies questions or concerns regarding injection and/or medication(s) being administered.    Velcade injected SQ into right upper outer arm at 45 degree angle per protocol rotating sites.     Injection administered per protocol. Patient tolerated infusion without incident, no signs or symptoms of adverse reaction noted. Patient denies pain or discomfort.     Covered with a sterile bandage. Pt instructed to leave bandage intact for a minimum of one hour, and to call with questions or concerns. Copy of appointments, discharge instructions, and after visit summary (AVS) provided to patient. Patient states understanding, discharged ambulatory.

## 2022-01-11 ENCOUNTER — LAB (OUTPATIENT)
Dept: LAB | Facility: OTHER | Age: 72
End: 2022-01-11
Attending: INTERNAL MEDICINE
Payer: MEDICARE

## 2022-01-11 DIAGNOSIS — C90.00 MULTIPLE MYELOMA NOT HAVING ACHIEVED REMISSION (H): Primary | ICD-10-CM

## 2022-01-11 PROCEDURE — 82784 ASSAY IGA/IGD/IGG/IGM EACH: CPT | Mod: ZL

## 2022-01-11 PROCEDURE — 86335 IMMUNFIX E-PHORSIS/URINE/CSF: CPT | Mod: ZL

## 2022-01-11 PROCEDURE — 86334 IMMUNOFIX E-PHORESIS SERUM: CPT | Mod: ZL

## 2022-01-11 PROCEDURE — 84155 ASSAY OF PROTEIN SERUM: CPT | Mod: ZL

## 2022-01-11 PROCEDURE — 83521 IG LIGHT CHAINS FREE EACH: CPT | Mod: ZL

## 2022-01-11 PROCEDURE — 84165 PROTEIN E-PHORESIS SERUM: CPT | Mod: ZL | Performed by: PATHOLOGY

## 2022-01-11 PROCEDURE — 36415 COLL VENOUS BLD VENIPUNCTURE: CPT | Mod: ZL

## 2022-01-12 ENCOUNTER — HOSPITAL ENCOUNTER (OUTPATIENT)
Dept: PHYSICAL THERAPY | Facility: HOSPITAL | Age: 72
Setting detail: THERAPIES SERIES
End: 2022-01-12
Attending: NURSE PRACTITIONER
Payer: MEDICARE

## 2022-01-12 LAB
PROT ELPH PNL UR ELPH: NORMAL
TOTAL PROTEIN SERUM FOR ELP: 7.1 G/DL (ref 6.8–8.8)

## 2022-01-12 PROCEDURE — 84165 PROTEIN E-PHORESIS SERUM: CPT | Mod: 26 | Performed by: PATHOLOGY

## 2022-01-12 PROCEDURE — 86335 IMMUNFIX E-PHORSIS/URINE/CSF: CPT | Performed by: PATHOLOGY

## 2022-01-12 PROCEDURE — 97110 THERAPEUTIC EXERCISES: CPT | Mod: GP

## 2022-01-12 NOTE — PROGRESS NOTES
Oncology Follow-up Visit    Reason for Visit:  Carmelita is a 71 year old woman with a diagnosis of multiple myeloma, who presents to the clinic today for consideration of ongoing therapy. She is due today to commence C19 Darzalex Faspro, Velcade, and Dexamethasone.     Nursing Note and documentation reviewed: Yes.    Interval History:   Patient reports she is doing well. She denies any concerns today. She was out earlier snow blowing. She reports she likes staying active and doing these kind of things. She has 2 days left of PT. Continues to endorse that this has helped her. She denies any signs of infection. No fever, chills, chest pain, cough, or SOB. She was recently on abx for a presumed cellulitis at an IV site. She finished her Keflex and notes that this has improved. She denies bleeding concerns. No nausea or vomiting. Appetite is pretty good. Weight is stable. No bowel concerns. Energy decent. Denies any peripheral neuropathy in her fingers or toes. Overall, really doing quite well.      Oncologic History  12/31/2018 Presented to the emergency room with vertigo and fatigue.  CT scan of the head was negative and subsequent stress test was negative.  5/3/2019 PCP ordered lab work and noted a total protein of 12.9.  SPEP at that time showed an M spike of 6.2 with a large monoclonal protein seen in the gamma fraction. Urine immunofixation showed a possible small protein band in the gamma fraction  5/31/2019 Evaluated by Dr. Walker with Medical Oncology with plan to rule out myeloma and obtain bone marrow aspiration biopsy as well as a metastatic bone survey along with additional labwork  6/18/2019 Underwent bone marrow aspiration and biopsy  6/24/2019 Seen again by Dr. Walker and CBC showed a hemoglobin of 9.3, M spike 7.3 with monoclonal IgG immunoglobulin of kappa light chain type; serum viscosity was 2.9; quantitative immunoglobulins showed an IgG of 8160, beta-2 microglobulin was 5.8, BUN was 21 with  creatinine is 0.8 and total protein was 13.7.  Quantitative kappa/lambda free light chains showed an elevated ratio of 17.0 bone marrow aspiration biopsy showed plasma cell myeloma with approximately 80% plasma cells.  Immunofixation showed IgG kappa and flow cytometry revealed kappa monotypic plasma cells consistent with clonal plasma cell neoplasm and FISH panel was pending at that time.  It was felt she had at least stage II disease based on her beta-2 microglobulin and anemia.  Plan was to treat with Velcade 1.3 mg per/m2 days 1, 4, 8 and 11/Decadron 40 mg on days 1, 8 and 15. Initiation of Revlimid with C2 at 25 mg daily days 1 through 14.  Plan was to also obtain an MRI of the lumbar spine to rule out lytic lesion at L3.  She was initiated on Zovirax 400 mg p.o. twice daily.  6/25/2019 C1 of chemotherapy initiated  7/1/2019 Note in chart regarding patient's large co-pay for the Revlimid and no plan at this point to initiate Revlimid and treat only with Velcade and Decadron per Dr. Walker  7/11/2019 MRI lumbar spine shows a pathologic superior endplate compression fracture at L3 without evidence of retropulsed fragment and innumerable enhancing lesions throughout the lumbar spine consistent with history of multiple myeloma.  9/10/2019 Increasing M-spike and kappa lambda ratio  10/1/2019 Initiation of CyBorD  11/17/2021 Stabilization of M-spike at 1.2  12/1/2020 Initiation of Darzalex Faspro injection  3/23/2021 M-spike increased form 1.0 to 1.2 and velcade and dexamethasone added to plan     Current Chemo Regime/TX:  Darzalex faspro injection 1800mg subcutaneous per protocol on Day 1 with velcade 1.3mg/m2 on days 1, 4, 8, 11 with weekly Dexamethasone, given every 21 days      **Zometa 4mg every 3 months  Current Cycle: 19 Day 1   # of completed cycles:  18 (Velcade & Dex added C5)     Previous treatment:   Velcade 1.3 mg/m2 days 1, 4, 8 and 11 with Decadron 40 mg days 1, 8 and 15 x 4 cycles;   Velcade  "1.5mg.m2/cyclophosphamide 150mg every 7 days on days 1,8,15 and 22/decadron 40mg days 1,8,15,22 ; Darzalex faspro injection 1800mg subcutaneous per protocol    Past Medical History:   Diagnosis Date     Arthritis      Depressive disorder      Diabetes mellitus, type 2 (H) 1/18/2021     Essential hypertension 10/1/2015     Major depressive disorder, recurrent episode, mild (H) 10/1/2015     Mixed hyperlipidemia 10/1/2015     Multiple myeloma not having achieved remission (H) 6/24/2019     Other specified disorders of bladder 07/09/2012    Irritable Bladder     Seasonal allergies 10/1/2015     Unspecified essential hypertension 03/19/2007     Unspecified sinusitis (chronic) 09/05/2007       Past Surgical History:   Procedure Laterality Date     APPENDECTOMY       BONE MARROW BIOPSY, BONE SPECIMEN, NEEDLE/TROCAR N/A 6/18/2019    Procedure: BONE MARROW BIOPSY;  Surgeon: Maciej Sanz MD;  Location: [facility]     CHOLECYSTECTOMY       COLONOSCOPY  07-    repeat 10 years     COLONOSCOPY N/A 12/30/2016    Procedure: COLONOSCOPY;  Surgeon: Bhaskar Franklin DO;  Location: [facility] OR     SINUS SURGERY       TUBAL STERILIZATION         Family History   Problem Relation Age of Onset     Breast Cancer Mother 66        Cause of Death     Parkinsonism Father         \"Possible\"     Coronary Artery Disease Father      Pacemaker Father      Thyroid Disease Daughter      Diabetes No family hx of      Hypertension No family hx of      Hyperlipidemia No family hx of      Cerebrovascular Disease No family hx of      Colon Cancer No family hx of      Prostate Cancer No family hx of      Genetic Disorder No family hx of      Asthma No family hx of      Anesthesia Reaction No family hx of        Social History     Socioeconomic History     Marital status:      Spouse name: Not on file     Number of children: Not on file     Years of education: Not on file     Highest education level: Not on file   Occupational History     " Occupation: Financial     Comment:  - ([employer])   Tobacco Use     Smoking status: Never Smoker     Smokeless tobacco: Never Used     Tobacco comment: Tried to Quit (YES); QUIT in 1971; Passive Exposure (NO)   Substance and Sexual Activity     Alcohol use: Yes     Comment: RARELY     Drug use: No     Sexual activity: Yes     Partners: Male     Birth control/protection: None   Other Topics Concern      Service Not Asked     Blood Transfusions Not Asked     Caffeine Concern Yes     Comment: Coffee >6 cups daily     Occupational Exposure Not Asked     Hobby Hazards Not Asked     Sleep Concern Not Asked     Stress Concern Not Asked     Weight Concern Not Asked     Special Diet Not Asked     Back Care Not Asked     Exercise Not Asked     Bike Helmet Not Asked     Seat Belt Not Asked     Self-Exams Not Asked     Parent/sibling w/ CABG, MI or angioplasty before 65F 55M? No   Social History Narrative     Not on file     Social Determinants of Health     Financial Resource Strain: Not on file   Food Insecurity: Not on file   Transportation Needs: Not on file   Physical Activity: Not on file   Stress: Not on file   Social Connections: Not on file   Intimate Partner Violence: Not on file   Housing Stability: Not on file       Current Outpatient Medications   Medication     acyclovir (ZOVIRAX) 400 MG tablet     aspirin (ASA) 81 MG chewable tablet     atorvastatin (LIPITOR) 10 MG tablet     dexamethasone (DECADRON) 4 MG tablet     fluticasone (FLONASE) 50 MCG/ACT nasal spray     L-Lysine HCl 500 MG CAPS     losartan (COZAAR) 50 MG tablet     sertraline (ZOLOFT) 50 MG tablet     EPINEPHrine (EPIPEN) 0.3 MG/0.3ML injection     prochlorperazine (COMPAZINE) 10 MG tablet     No current facility-administered medications for this visit.     Facility-Administered Medications Ordered in Other Visits   Medication     bortezomib (VELCADE) subcutaneous injection 2.4 mg     daratumumab-hyaluronidase-fihj (DARZALEX FASPRO)  "SUBCUTANEOUS injection 1,800 mg        Allergies   Allergen Reactions     Lisinopril Cough     Phenylephrine Hcl Other (See Comments)     **Entex  HEADACHE (SEVERE)     Phenylpropanolamine Other (See Comments)     **Entex  HEADACHE (SEVERE)     Pseudoephedrine Tannate Other (See Comments)     **Entex  HEADACHE (SEVERE)     Levofloxacin Rash     **Levaquin     Moxifloxacin Hcl [Avelox] Rash       Review Of Systems:  A complete review of systems is negative except for the above mentioned items in the interval history.     ECOG Performance Status: 0    Physical Exam:  /60 (BP Location: Right arm)   Pulse 99   Temp 99.2  F (37.3  C) (Tympanic)   Ht 1.6 m (5' 3\")   Wt 76.1 kg (167 lb 12.3 oz)   SpO2 93%   BMI 29.72 kg/m    GENERAL APPEARANCE: Healthy, alert and in no acute distress.  HEENT: Eyes appear normal without scleral icterus. Extraocular movements intact. Mouth appears normal without lesions, ulcers, or thrush.  NECK:   Supple with normal range of motion. No asymmetry or masses.  LYMPHATICS: No palpable cervical, supraclavicular, axillary nodes.  RESP: Lungs clear to auscultation bilaterally, respirations regular and easy.  CARDIOVASCULAR: Regular rate and rhythm. Normal S1, S2; no murmur, gallop, or rub.  ABDOMEN: Soft, non-tender, non-distended. Bowel sounds auscultated all 4 quadrants. No palpable organomegaly or masses.  MUSCULOSKELETAL: Extremities without gross deformities noted. No edema of bilateral lower extremities.  SKIN: No suspicious lesions or rashes.  NEURO: Alert and oriented x 3.  Gait steady.  PSYCHIATRIC: Mentation and affect appear normal.  Mood appropriate.    Laboratory: Myeloma studies drawn 1/11 show an Mspike that has decreased from 0.3 to 0.2. Light chains stable. Immunoglobulins stable.  Results for orders placed or performed in visit on 01/18/22   Comprehensive metabolic panel     Status: Abnormal   Result Value Ref Range    Sodium 137 133 - 144 mmol/L    Potassium 3.7 3.4 " - 5.3 mmol/L    Chloride 105 94 - 109 mmol/L    Carbon Dioxide (CO2) 24 20 - 32 mmol/L    Anion Gap 8 3 - 14 mmol/L    Urea Nitrogen 25 7 - 30 mg/dL    Creatinine 0.86 0.52 - 1.04 mg/dL    Calcium 9.1 8.5 - 10.1 mg/dL    Glucose 171 (H) 70 - 99 mg/dL    Alkaline Phosphatase 104 40 - 150 U/L    AST 13 0 - 45 U/L    ALT 18 0 - 50 U/L    Protein Total 6.8 6.8 - 8.8 g/dL    Albumin 3.6 3.4 - 5.0 g/dL    Bilirubin Total 0.3 0.2 - 1.3 mg/dL    GFR Estimate 71 >60 mL/min/1.73m2   CBC with platelets and differential     Status: Abnormal   Result Value Ref Range    WBC Count 5.2 4.0 - 11.0 10e3/uL    RBC Count 4.30 3.80 - 5.20 10e6/uL    Hemoglobin 13.2 11.7 - 15.7 g/dL    Hematocrit 39.7 35.0 - 47.0 %    MCV 92 78 - 100 fL    MCH 30.7 26.5 - 33.0 pg    MCHC 33.2 31.5 - 36.5 g/dL    RDW 15.7 (H) 10.0 - 15.0 %    Platelet Count 262 150 - 450 10e3/uL    % Neutrophils 93 %    % Lymphocytes 4 %    % Monocytes 2 %    % Eosinophils 0 %    % Basophils 0 %    % Immature Granulocytes 1 %    NRBCs per 100 WBC 0 <1 /100    Absolute Neutrophils 4.8 1.6 - 8.3 10e3/uL    Absolute Lymphocytes 0.2 (L) 0.8 - 5.3 10e3/uL    Absolute Monocytes 0.1 0.0 - 1.3 10e3/uL    Absolute Eosinophils 0.0 0.0 - 0.7 10e3/uL    Absolute Basophils 0.0 0.0 - 0.2 10e3/uL    Absolute Immature Granulocytes 0.1 <=0.4 10e3/uL    Absolute NRBCs 0.0 10e3/uL   CBC with platelets differential     Status: Abnormal    Narrative    The following orders were created for panel order CBC with platelets differential.  Procedure                               Abnormality         Status                     ---------                               -----------         ------                     CBC with platelets and d...[[id number]]  Abnormal            Final result                 Please view results for these tests on the individual orders.       Imaging Studies:  None this visit.     ASSESSMENT/PLAN:  #1 Multiple myeloma:   IgG kappa multiple myeloma with 80% involvement of the  bone marrow diagnosed June 2019 initially treated with Velcade and Decadron and received 4 cycles with increasing M-spike and kappa/lambda ratio.  Treatment changed to CyBorD on 10/1/2019.  M-spike plateau at 1.2 and treatment changed to monotherapy with Darzalex Faspro injection. M spike declined to 1.0 then increased again to 1.2 so Velcade and Dex added to her treatment plan with cycle 5 therapy. Today, patients m spike has decreased from 0.3 to 0.2. She seems to be doing well and tolerating therapy with modest side effects. We will therefore continue C19 without changes. She will follow-up in 3 weeks, sooner with concerns.      #2 Lytic lesions: Continue with the Zometa every 3 months. Last given 12/14/2021 so due again March 2022. She remains on calcium with vitamin D twice a day. She does note that she may have dental work coming up. I told her that as big of a heads-up and she can give us the better. Hopeful to hold zometa for at least 8 weeks prior and after work is completed to facilitate healing and minimize risk of ONJ.     Patient in agreement with plan and verbalizes understanding. Agrees to call with any questions or concerns.    39 minutes spent in the patient's encounter today with time spent in review of patient's chart along with chart preparation and review of the treatment plan and signing of treatment plan.  Time was also spent with the patient in obtaining a review of systems and performing a physical exam along with detailed review of all test results. Time was also spent in discussing plan for future follow-up and relating instructions for follow-up and in placing future orders.    FLO Duque, FNP-C

## 2022-01-13 LAB
ALBUMIN SERPL ELPH-MCNC: 4.3 G/DL (ref 3.7–5.1)
ALPHA1 GLOB SERPL ELPH-MCNC: 0.4 G/DL (ref 0.2–0.4)
ALPHA2 GLOB SERPL ELPH-MCNC: 0.8 G/DL (ref 0.5–0.9)
B-GLOBULIN SERPL ELPH-MCNC: 0.7 G/DL (ref 0.6–1)
GAMMA GLOB SERPL ELPH-MCNC: 0.8 G/DL (ref 0.7–1.6)
IGA SERPL-MCNC: 11 MG/DL (ref 84–499)
IGG SERPL-MCNC: 785 MG/DL (ref 610–1616)
IGM SERPL-MCNC: 13 MG/DL (ref 35–242)
KAPPA LC FREE SER-MCNC: 0.52 MG/DL (ref 0.33–1.94)
KAPPA LC FREE/LAMBDA FREE SER NEPH: 2.26 {RATIO} (ref 0.26–1.65)
LAMBDA LC FREE SERPL-MCNC: 0.23 MG/DL (ref 0.57–2.63)
M PROTEIN SERPL ELPH-MCNC: 0.2 G/DL
PROT PATTERN SERPL ELPH-IMP: ABNORMAL
PROT PATTERN SERPL IFE-IMP: NORMAL

## 2022-01-13 PROCEDURE — 86334 IMMUNOFIX E-PHORESIS SERUM: CPT | Performed by: PATHOLOGY

## 2022-01-13 PROCEDURE — 84165 PROTEIN E-PHORESIS SERUM: CPT | Mod: 26 | Performed by: PATHOLOGY

## 2022-01-18 ENCOUNTER — ONCOLOGY VISIT (OUTPATIENT)
Dept: ONCOLOGY | Facility: OTHER | Age: 72
End: 2022-01-18
Attending: NURSE PRACTITIONER
Payer: MEDICARE

## 2022-01-18 ENCOUNTER — INFUSION THERAPY VISIT (OUTPATIENT)
Dept: INFUSION THERAPY | Facility: OTHER | Age: 72
End: 2022-01-18
Attending: INTERNAL MEDICINE
Payer: MEDICARE

## 2022-01-18 ENCOUNTER — APPOINTMENT (OUTPATIENT)
Dept: LAB | Facility: OTHER | Age: 72
End: 2022-01-18
Attending: INTERNAL MEDICINE
Payer: MEDICARE

## 2022-01-18 VITALS
SYSTOLIC BLOOD PRESSURE: 102 MMHG | BODY MASS INDEX: 29.73 KG/M2 | HEART RATE: 99 BPM | TEMPERATURE: 99.2 F | WEIGHT: 167.77 LBS | OXYGEN SATURATION: 93 % | DIASTOLIC BLOOD PRESSURE: 60 MMHG | HEIGHT: 63 IN

## 2022-01-18 VITALS
OXYGEN SATURATION: 93 % | BODY MASS INDEX: 29.73 KG/M2 | HEIGHT: 63 IN | DIASTOLIC BLOOD PRESSURE: 60 MMHG | RESPIRATION RATE: 18 BRPM | SYSTOLIC BLOOD PRESSURE: 102 MMHG | TEMPERATURE: 99.2 F | HEART RATE: 99 BPM | WEIGHT: 167.77 LBS

## 2022-01-18 DIAGNOSIS — C90.00 MULTIPLE MYELOMA NOT HAVING ACHIEVED REMISSION (H): Primary | ICD-10-CM

## 2022-01-18 LAB
ALBUMIN SERPL-MCNC: 3.6 G/DL (ref 3.4–5)
ALP SERPL-CCNC: 104 U/L (ref 40–150)
ALT SERPL W P-5'-P-CCNC: 18 U/L (ref 0–50)
ANION GAP SERPL CALCULATED.3IONS-SCNC: 8 MMOL/L (ref 3–14)
AST SERPL W P-5'-P-CCNC: 13 U/L (ref 0–45)
BASOPHILS # BLD AUTO: 0 10E3/UL (ref 0–0.2)
BASOPHILS NFR BLD AUTO: 0 %
BILIRUB SERPL-MCNC: 0.3 MG/DL (ref 0.2–1.3)
BUN SERPL-MCNC: 25 MG/DL (ref 7–30)
CALCIUM SERPL-MCNC: 9.1 MG/DL (ref 8.5–10.1)
CHLORIDE BLD-SCNC: 105 MMOL/L (ref 94–109)
CO2 SERPL-SCNC: 24 MMOL/L (ref 20–32)
CREAT SERPL-MCNC: 0.86 MG/DL (ref 0.52–1.04)
EOSINOPHIL # BLD AUTO: 0 10E3/UL (ref 0–0.7)
EOSINOPHIL NFR BLD AUTO: 0 %
ERYTHROCYTE [DISTWIDTH] IN BLOOD BY AUTOMATED COUNT: 15.7 % (ref 10–15)
GFR SERPL CREATININE-BSD FRML MDRD: 71 ML/MIN/1.73M2
GLUCOSE BLD-MCNC: 171 MG/DL (ref 70–99)
HCT VFR BLD AUTO: 39.7 % (ref 35–47)
HGB BLD-MCNC: 13.2 G/DL (ref 11.7–15.7)
IMM GRANULOCYTES # BLD: 0.1 10E3/UL
IMM GRANULOCYTES NFR BLD: 1 %
LYMPHOCYTES # BLD AUTO: 0.2 10E3/UL (ref 0.8–5.3)
LYMPHOCYTES NFR BLD AUTO: 4 %
MCH RBC QN AUTO: 30.7 PG (ref 26.5–33)
MCHC RBC AUTO-ENTMCNC: 33.2 G/DL (ref 31.5–36.5)
MCV RBC AUTO: 92 FL (ref 78–100)
MONOCYTES # BLD AUTO: 0.1 10E3/UL (ref 0–1.3)
MONOCYTES NFR BLD AUTO: 2 %
NEUTROPHILS # BLD AUTO: 4.8 10E3/UL (ref 1.6–8.3)
NEUTROPHILS NFR BLD AUTO: 93 %
NRBC # BLD AUTO: 0 10E3/UL
NRBC BLD AUTO-RTO: 0 /100
PLATELET # BLD AUTO: 262 10E3/UL (ref 150–450)
POTASSIUM BLD-SCNC: 3.7 MMOL/L (ref 3.4–5.3)
PROT SERPL-MCNC: 6.8 G/DL (ref 6.8–8.8)
RBC # BLD AUTO: 4.3 10E6/UL (ref 3.8–5.2)
SODIUM SERPL-SCNC: 137 MMOL/L (ref 133–144)
WBC # BLD AUTO: 5.2 10E3/UL (ref 4–11)

## 2022-01-18 PROCEDURE — 250N000011 HC RX IP 250 OP 636: Performed by: NURSE PRACTITIONER

## 2022-01-18 PROCEDURE — 96401 CHEMO ANTI-NEOPL SQ/IM: CPT

## 2022-01-18 PROCEDURE — 82040 ASSAY OF SERUM ALBUMIN: CPT | Mod: ZL | Performed by: NURSE PRACTITIONER

## 2022-01-18 PROCEDURE — 85025 COMPLETE CBC W/AUTO DIFF WBC: CPT | Mod: ZL | Performed by: NURSE PRACTITIONER

## 2022-01-18 PROCEDURE — G0463 HOSPITAL OUTPT CLINIC VISIT: HCPCS | Mod: 25

## 2022-01-18 PROCEDURE — 250N000013 HC RX MED GY IP 250 OP 250 PS 637: Performed by: NURSE PRACTITIONER

## 2022-01-18 PROCEDURE — 80053 COMPREHEN METABOLIC PANEL: CPT | Mod: ZL | Performed by: NURSE PRACTITIONER

## 2022-01-18 PROCEDURE — 99214 OFFICE O/P EST MOD 30 MIN: CPT | Performed by: NURSE PRACTITIONER

## 2022-01-18 PROCEDURE — 36415 COLL VENOUS BLD VENIPUNCTURE: CPT | Mod: ZL | Performed by: NURSE PRACTITIONER

## 2022-01-18 PROCEDURE — 96413 CHEMO IV INFUSION 1 HR: CPT

## 2022-01-18 PROCEDURE — 96374 THER/PROPH/DIAG INJ IV PUSH: CPT

## 2022-01-18 RX ORDER — SODIUM CHLORIDE 9 MG/ML
1000 INJECTION, SOLUTION INTRAVENOUS CONTINUOUS PRN
Status: CANCELLED
Start: 2022-01-28

## 2022-01-18 RX ORDER — NALOXONE HYDROCHLORIDE 0.4 MG/ML
.1-.4 INJECTION, SOLUTION INTRAMUSCULAR; INTRAVENOUS; SUBCUTANEOUS
Status: CANCELLED | OUTPATIENT
Start: 2022-01-21

## 2022-01-18 RX ORDER — DIPHENHYDRAMINE HCL 50 MG
50 CAPSULE ORAL ONCE
Status: CANCELLED | OUTPATIENT
Start: 2022-01-18

## 2022-01-18 RX ORDER — EPINEPHRINE 1 MG/ML
0.3 INJECTION, SOLUTION, CONCENTRATE INTRAVENOUS EVERY 5 MIN PRN
Status: CANCELLED | OUTPATIENT
Start: 2022-01-28

## 2022-01-18 RX ORDER — METHYLPREDNISOLONE SODIUM SUCCINATE 125 MG/2ML
125 INJECTION, POWDER, LYOPHILIZED, FOR SOLUTION INTRAMUSCULAR; INTRAVENOUS
Status: CANCELLED
Start: 2022-01-28

## 2022-01-18 RX ORDER — SODIUM CHLORIDE 9 MG/ML
1000 INJECTION, SOLUTION INTRAVENOUS CONTINUOUS PRN
Status: CANCELLED
Start: 2022-01-21

## 2022-01-18 RX ORDER — ALBUTEROL SULFATE 90 UG/1
1-2 AEROSOL, METERED RESPIRATORY (INHALATION)
Status: CANCELLED
Start: 2022-01-18

## 2022-01-18 RX ORDER — EPINEPHRINE 1 MG/ML
0.3 INJECTION, SOLUTION, CONCENTRATE INTRAVENOUS EVERY 5 MIN PRN
Status: CANCELLED | OUTPATIENT
Start: 2022-01-21

## 2022-01-18 RX ORDER — LORAZEPAM 2 MG/ML
0.5 INJECTION INTRAMUSCULAR EVERY 4 HOURS PRN
Status: CANCELLED | OUTPATIENT
Start: 2022-01-28

## 2022-01-18 RX ORDER — HEPARIN SODIUM,PORCINE 10 UNIT/ML
5 VIAL (ML) INTRAVENOUS
Status: CANCELLED | OUTPATIENT
Start: 2022-01-25

## 2022-01-18 RX ORDER — MEPERIDINE HYDROCHLORIDE 25 MG/ML
25 INJECTION INTRAMUSCULAR; INTRAVENOUS; SUBCUTANEOUS EVERY 30 MIN PRN
Status: CANCELLED | OUTPATIENT
Start: 2022-01-28

## 2022-01-18 RX ORDER — ALBUTEROL SULFATE 90 UG/1
1-2 AEROSOL, METERED RESPIRATORY (INHALATION)
Status: CANCELLED
Start: 2022-01-25

## 2022-01-18 RX ORDER — ALBUTEROL SULFATE 90 UG/1
1-2 AEROSOL, METERED RESPIRATORY (INHALATION)
Status: CANCELLED
Start: 2022-01-28

## 2022-01-18 RX ORDER — LORAZEPAM 2 MG/ML
0.5 INJECTION INTRAMUSCULAR EVERY 4 HOURS PRN
Status: CANCELLED | OUTPATIENT
Start: 2022-01-21

## 2022-01-18 RX ORDER — METHYLPREDNISOLONE SODIUM SUCCINATE 125 MG/2ML
125 INJECTION, POWDER, LYOPHILIZED, FOR SOLUTION INTRAMUSCULAR; INTRAVENOUS
Status: CANCELLED
Start: 2022-01-25

## 2022-01-18 RX ORDER — MEPERIDINE HYDROCHLORIDE 25 MG/ML
25 INJECTION INTRAMUSCULAR; INTRAVENOUS; SUBCUTANEOUS EVERY 30 MIN PRN
Status: CANCELLED | OUTPATIENT
Start: 2022-01-21

## 2022-01-18 RX ORDER — HEPARIN SODIUM (PORCINE) LOCK FLUSH IV SOLN 100 UNIT/ML 100 UNIT/ML
5 SOLUTION INTRAVENOUS
Status: CANCELLED | OUTPATIENT
Start: 2022-01-21

## 2022-01-18 RX ORDER — MEPERIDINE HYDROCHLORIDE 25 MG/ML
25 INJECTION INTRAMUSCULAR; INTRAVENOUS; SUBCUTANEOUS EVERY 30 MIN PRN
Status: CANCELLED | OUTPATIENT
Start: 2022-01-25

## 2022-01-18 RX ORDER — HEPARIN SODIUM (PORCINE) LOCK FLUSH IV SOLN 100 UNIT/ML 100 UNIT/ML
5 SOLUTION INTRAVENOUS
Status: CANCELLED | OUTPATIENT
Start: 2022-01-28

## 2022-01-18 RX ORDER — HEPARIN SODIUM (PORCINE) LOCK FLUSH IV SOLN 100 UNIT/ML 100 UNIT/ML
5 SOLUTION INTRAVENOUS
Status: CANCELLED | OUTPATIENT
Start: 2022-01-25

## 2022-01-18 RX ORDER — EPINEPHRINE 1 MG/ML
0.3 INJECTION, SOLUTION, CONCENTRATE INTRAVENOUS EVERY 5 MIN PRN
Status: CANCELLED | OUTPATIENT
Start: 2022-01-18

## 2022-01-18 RX ORDER — ALBUTEROL SULFATE 0.83 MG/ML
2.5 SOLUTION RESPIRATORY (INHALATION)
Status: CANCELLED | OUTPATIENT
Start: 2022-01-21

## 2022-01-18 RX ORDER — DIPHENHYDRAMINE HCL 50 MG
50 CAPSULE ORAL ONCE
Status: COMPLETED | OUTPATIENT
Start: 2022-01-18 | End: 2022-01-18

## 2022-01-18 RX ORDER — HEPARIN SODIUM,PORCINE 10 UNIT/ML
5 VIAL (ML) INTRAVENOUS
Status: CANCELLED | OUTPATIENT
Start: 2022-01-28

## 2022-01-18 RX ORDER — ACETAMINOPHEN 325 MG/1
650 TABLET ORAL ONCE
Status: COMPLETED | OUTPATIENT
Start: 2022-01-18 | End: 2022-01-18

## 2022-01-18 RX ORDER — SODIUM CHLORIDE 9 MG/ML
1000 INJECTION, SOLUTION INTRAVENOUS CONTINUOUS PRN
Status: CANCELLED
Start: 2022-01-18

## 2022-01-18 RX ORDER — HEPARIN SODIUM,PORCINE 10 UNIT/ML
5 VIAL (ML) INTRAVENOUS
Status: CANCELLED | OUTPATIENT
Start: 2022-01-18

## 2022-01-18 RX ORDER — NALOXONE HYDROCHLORIDE 0.4 MG/ML
.1-.4 INJECTION, SOLUTION INTRAMUSCULAR; INTRAVENOUS; SUBCUTANEOUS
Status: CANCELLED | OUTPATIENT
Start: 2022-01-25

## 2022-01-18 RX ORDER — NALOXONE HYDROCHLORIDE 0.4 MG/ML
.1-.4 INJECTION, SOLUTION INTRAMUSCULAR; INTRAVENOUS; SUBCUTANEOUS
Status: CANCELLED | OUTPATIENT
Start: 2022-01-28

## 2022-01-18 RX ORDER — DIPHENHYDRAMINE HYDROCHLORIDE 50 MG/ML
50 INJECTION INTRAMUSCULAR; INTRAVENOUS
Status: CANCELLED
Start: 2022-01-28

## 2022-01-18 RX ORDER — MEPERIDINE HYDROCHLORIDE 25 MG/ML
25 INJECTION INTRAMUSCULAR; INTRAVENOUS; SUBCUTANEOUS EVERY 30 MIN PRN
Status: CANCELLED | OUTPATIENT
Start: 2022-01-18

## 2022-01-18 RX ORDER — DIPHENHYDRAMINE HYDROCHLORIDE 50 MG/ML
50 INJECTION INTRAMUSCULAR; INTRAVENOUS
Status: CANCELLED
Start: 2022-01-25

## 2022-01-18 RX ORDER — ALBUTEROL SULFATE 90 UG/1
1-2 AEROSOL, METERED RESPIRATORY (INHALATION)
Status: CANCELLED
Start: 2022-01-21

## 2022-01-18 RX ORDER — METHYLPREDNISOLONE SODIUM SUCCINATE 125 MG/2ML
125 INJECTION, POWDER, LYOPHILIZED, FOR SOLUTION INTRAMUSCULAR; INTRAVENOUS
Status: CANCELLED
Start: 2022-01-18

## 2022-01-18 RX ORDER — METHYLPREDNISOLONE SODIUM SUCCINATE 125 MG/2ML
125 INJECTION, POWDER, LYOPHILIZED, FOR SOLUTION INTRAMUSCULAR; INTRAVENOUS
Status: CANCELLED
Start: 2022-01-21

## 2022-01-18 RX ORDER — LORAZEPAM 2 MG/ML
0.5 INJECTION INTRAMUSCULAR EVERY 4 HOURS PRN
Status: CANCELLED | OUTPATIENT
Start: 2022-01-18

## 2022-01-18 RX ORDER — DIPHENHYDRAMINE HYDROCHLORIDE 50 MG/ML
50 INJECTION INTRAMUSCULAR; INTRAVENOUS
Status: CANCELLED
Start: 2022-01-21

## 2022-01-18 RX ORDER — ALBUTEROL SULFATE 0.83 MG/ML
2.5 SOLUTION RESPIRATORY (INHALATION)
Status: CANCELLED | OUTPATIENT
Start: 2022-01-25

## 2022-01-18 RX ORDER — LORAZEPAM 2 MG/ML
0.5 INJECTION INTRAMUSCULAR EVERY 4 HOURS PRN
Status: CANCELLED | OUTPATIENT
Start: 2022-01-25

## 2022-01-18 RX ORDER — HEPARIN SODIUM (PORCINE) LOCK FLUSH IV SOLN 100 UNIT/ML 100 UNIT/ML
5 SOLUTION INTRAVENOUS
Status: CANCELLED | OUTPATIENT
Start: 2022-01-18

## 2022-01-18 RX ORDER — DIPHENHYDRAMINE HYDROCHLORIDE 50 MG/ML
50 INJECTION INTRAMUSCULAR; INTRAVENOUS
Status: CANCELLED
Start: 2022-01-18

## 2022-01-18 RX ORDER — NALOXONE HYDROCHLORIDE 0.4 MG/ML
.1-.4 INJECTION, SOLUTION INTRAMUSCULAR; INTRAVENOUS; SUBCUTANEOUS
Status: CANCELLED | OUTPATIENT
Start: 2022-01-18

## 2022-01-18 RX ORDER — HEPARIN SODIUM,PORCINE 10 UNIT/ML
5 VIAL (ML) INTRAVENOUS
Status: CANCELLED | OUTPATIENT
Start: 2022-01-21

## 2022-01-18 RX ORDER — SODIUM CHLORIDE 9 MG/ML
1000 INJECTION, SOLUTION INTRAVENOUS CONTINUOUS PRN
Status: CANCELLED
Start: 2022-01-25

## 2022-01-18 RX ORDER — ALBUTEROL SULFATE 0.83 MG/ML
2.5 SOLUTION RESPIRATORY (INHALATION)
Status: CANCELLED | OUTPATIENT
Start: 2022-01-28

## 2022-01-18 RX ORDER — EPINEPHRINE 1 MG/ML
0.3 INJECTION, SOLUTION, CONCENTRATE INTRAVENOUS EVERY 5 MIN PRN
Status: CANCELLED | OUTPATIENT
Start: 2022-01-25

## 2022-01-18 RX ORDER — ACETAMINOPHEN 325 MG/1
650 TABLET ORAL ONCE
Status: CANCELLED | OUTPATIENT
Start: 2022-01-18

## 2022-01-18 RX ORDER — ALBUTEROL SULFATE 0.83 MG/ML
2.5 SOLUTION RESPIRATORY (INHALATION)
Status: CANCELLED | OUTPATIENT
Start: 2022-01-18

## 2022-01-18 RX ADMIN — DARATUMUMAB AND HYALURONIDASE-FIHJ (HUMAN RECOMBINANT) 1800 MG: 1800; 30000 INJECTION SUBCUTANEOUS at 15:10

## 2022-01-18 RX ADMIN — DIPHENHYDRAMINE HCL 50 MG: 50 CAPSULE ORAL at 14:31

## 2022-01-18 RX ADMIN — BORTEZOMIB 2.4 MG: 3.5 INJECTION, POWDER, LYOPHILIZED, FOR SOLUTION INTRAVENOUS; SUBCUTANEOUS at 15:05

## 2022-01-18 RX ADMIN — ACETAMINOPHEN 650 MG: 325 TABLET, FILM COATED ORAL at 14:31

## 2022-01-18 ASSESSMENT — MIFFLIN-ST. JEOR
SCORE: 1235.13
SCORE: 1235

## 2022-01-18 NOTE — PROGRESS NOTES
"Oncology Rooming Note    January 18, 2022 1:43 PM   Carmelita Watts is a 71 year old female who presents for:    Chief Complaint   Patient presents with     Oncology Clinic Visit     Mojgan Smith     Initial Vitals: /60 (BP Location: Right arm)   Pulse 99   Temp 99.2  F (37.3  C) (Tympanic)   Ht 1.6 m (5' 3\")   Wt 76.1 kg (167 lb 12.3 oz)   SpO2 93%   BMI 29.72 kg/m   Estimated body mass index is 29.72 kg/m  as calculated from the following:    Height as of this encounter: 1.6 m (5' 3\").    Weight as of this encounter: 76.1 kg (167 lb 12.3 oz). Body surface area is 1.84 meters squared.  Data Unavailable Comment: Data Unavailable   No LMP recorded. Patient is postmenopausal.  Allergies reviewed: Yes  Medications reviewed: Yes    Medications: Medication refills not needed today.  Pharmacy name entered into [facility]:    [id number] [address] AT [facility] [address]  [facility] - [place], [place] - [facility] [address]    Clinical concerns:  Nida Gonzalez was notified.      Eve Garcia LPN              "

## 2022-01-18 NOTE — PATIENT INSTRUCTIONS
Thank you for allowing me to be a part of your care today.    We would like to see you back in 3 weeks, with your myeloma labs completed the week prior. We will also drawn some labs the same day.    If you have any questions please call [phone]    Other instructions:      None

## 2022-01-18 NOTE — PROGRESS NOTES
Patient is a 71 year old here today for injection of Faspro and Velcade per order of . Patient meets parameters for today's infusion. Patient identified with two identifiers, order verified, and verbal consent for today's infusion obtained from patient.     Component      Latest Ref Rng & Units 1/18/2022   WBC      4.0 - 11.0 10e3/uL 5.2   RBC Count      3.80 - 5.20 10e6/uL 4.30   Hemoglobin      11.7 - 15.7 g/dL 13.2   Hematocrit      35.0 - 47.0 % 39.7   MCV      78 - 100 fL 92   MCH      26.5 - 33.0 pg 30.7   MCHC      31.5 - 36.5 g/dL 33.2   RDW      10.0 - 15.0 % 15.7 (H)   Platelet Count      150 - 450 10e3/uL 262   % Neutrophils      % 93   % Lymphocytes      % 4   % Monocytes      % 2   % Eosinophils      % 0   % Basophils      % 0   % Immature Granulocytes      % 1   NRBCs per 100 WBC      <1 /100 0   Absolute Neutrophils      1.6 - 8.3 10e3/uL 4.8   Absolute Lymphocytes      0.8 - 5.3 10e3/uL 0.2 (L)   Absolute Monocytes      0.0 - 1.3 10e3/uL 0.1   Absolute Eosinophils      0.0 - 0.7 10e3/uL 0.0   Absolute Basophils      0.0 - 0.2 10e3/uL 0.0   Absolute Immature Granulocytes      <=0.4 10e3/uL 0.1   Absolute NRBCs      10e3/uL 0.0   Sodium      133 - 144 mmol/L 137   Potassium      3.4 - 5.3 mmol/L 3.7   Chloride      94 - 109 mmol/L 105   Carbon Dioxide      20 - 32 mmol/L 24   Anion Gap      3 - 14 mmol/L 8   Urea Nitrogen      7 - 30 mg/dL 25   Creatinine      0.52 - 1.04 mg/dL 0.86   Calcium      8.5 - 10.1 mg/dL 9.1   Glucose      70 - 99 mg/dL 171 (H)   Alkaline Phosphatase      40 - 150 U/L 104   AST      0 - 45 U/L 13   ALT      0 - 50 U/L 18   Protein Total      6.8 - 8.8 g/dL 6.8   Albumin      3.4 - 5.0 g/dL 3.6   Bilirubin Total      0.2 - 1.3 mg/dL 0.3   GFR Estimate      >60 mL/min/1.73m2 71       Patient meets order parameters for today's treatment.    Independent dose check completed with Anne ZHU RN prior to release of drugs.     Patient denies questions or concerns regarding  injection and/or medication(s) being administered.    1505 Velcade injected SQ into left upper outer arm at 45 degree angle  per protocol rotating sites.     Injection administered per protocol. Patient tolerated infusion without incident, no signs or symptoms of adverse reaction noted. Patient denies pain or discomfort.     1510 Faspro injected SQ into left upper abdomen at 45 degree angle per protocol rotating sites.     Injection administered per protocol. Patient tolerated infusion without incident, no signs or symptoms of adverse reaction noted. Patient denies pain or discomfort.     Covered with a sterile bandage. Pt instructed to leave bandage intact for a minimum of one hour, and to call with questions or concerns. Copy of appointments, discharge instructions, and after visit summary (AVS) provided to patient. Patient states understanding, discharged ambulatory.

## 2022-01-18 NOTE — PATIENT INSTRUCTIONS

## 2022-01-19 ENCOUNTER — HOSPITAL ENCOUNTER (OUTPATIENT)
Dept: PHYSICAL THERAPY | Facility: HOSPITAL | Age: 72
Setting detail: THERAPIES SERIES
End: 2022-01-19
Attending: NURSE PRACTITIONER
Payer: MEDICARE

## 2022-01-19 PROCEDURE — 97110 THERAPEUTIC EXERCISES: CPT | Mod: GP

## 2022-01-21 ENCOUNTER — INFUSION THERAPY VISIT (OUTPATIENT)
Dept: INFUSION THERAPY | Facility: OTHER | Age: 72
End: 2022-01-21
Attending: INTERNAL MEDICINE
Payer: MEDICARE

## 2022-01-21 VITALS
BODY MASS INDEX: 29.73 KG/M2 | HEIGHT: 63 IN | WEIGHT: 167.77 LBS | DIASTOLIC BLOOD PRESSURE: 79 MMHG | HEART RATE: 86 BPM | RESPIRATION RATE: 18 BRPM | SYSTOLIC BLOOD PRESSURE: 120 MMHG | TEMPERATURE: 98.5 F | OXYGEN SATURATION: 98 %

## 2022-01-21 DIAGNOSIS — C90.00 MULTIPLE MYELOMA NOT HAVING ACHIEVED REMISSION (H): Primary | ICD-10-CM

## 2022-01-21 PROCEDURE — 250N000011 HC RX IP 250 OP 636: Mod: JW | Performed by: NURSE PRACTITIONER

## 2022-01-21 PROCEDURE — 96402 CHEMO HORMON ANTINEOPL SQ/IM: CPT

## 2022-01-21 RX ADMIN — BORTEZOMIB 2.4 MG: 3.5 INJECTION, POWDER, LYOPHILIZED, FOR SOLUTION INTRAVENOUS; SUBCUTANEOUS at 13:15

## 2022-01-21 ASSESSMENT — MIFFLIN-ST. JEOR: SCORE: 1234.97

## 2022-01-21 ASSESSMENT — PAIN SCALES - GENERAL: PAINLEVEL: NO PAIN (0)

## 2022-01-21 NOTE — PROGRESS NOTES
1315  Injection administered per protocol. Patient tolerated infusion without incident, no signs or symptoms of adverse reaction noted. Patient denies pain or discomfort.      Covered with a sterile bandage. Pt instructed to leave bandage intact for a minimum of one hour, and to call with questions or concerns. Copy of appointments, discharge instructions, and after visit summary (AVS) provided to patient. Patient states understanding, discharged ambulatory.

## 2022-01-21 NOTE — PROGRESS NOTES
Patient is a 72 y/o female here today for injection of Velcade per order of . Patient meets parameters for today's infusion. Patient identified with two identifiers, order verified, and verbal consent for today's infusion obtained from patient. Written consent for treatment is on file and valid.    Recent lab values: WBC: 5.2, HGB: 13.2, PLT: 262  ANC: 4.8. Patient meets order parameters for today's treatment.  Patient denies questions or concerns regarding injection and/or medication(s) being administered.

## 2022-01-25 ENCOUNTER — INFUSION THERAPY VISIT (OUTPATIENT)
Dept: INFUSION THERAPY | Facility: OTHER | Age: 72
End: 2022-01-25
Attending: INTERNAL MEDICINE
Payer: MEDICARE

## 2022-01-25 VITALS
HEIGHT: 63 IN | TEMPERATURE: 98.5 F | OXYGEN SATURATION: 95 % | BODY MASS INDEX: 29.49 KG/M2 | SYSTOLIC BLOOD PRESSURE: 115 MMHG | DIASTOLIC BLOOD PRESSURE: 81 MMHG | HEART RATE: 81 BPM | RESPIRATION RATE: 18 BRPM | WEIGHT: 166.45 LBS

## 2022-01-25 DIAGNOSIS — C90.00 MULTIPLE MYELOMA NOT HAVING ACHIEVED REMISSION (H): Primary | ICD-10-CM

## 2022-01-25 LAB
BASOPHILS # BLD AUTO: 0 10E3/UL (ref 0–0.2)
BASOPHILS NFR BLD AUTO: 0 %
EOSINOPHIL # BLD AUTO: 0 10E3/UL (ref 0–0.7)
EOSINOPHIL NFR BLD AUTO: 1 %
ERYTHROCYTE [DISTWIDTH] IN BLOOD BY AUTOMATED COUNT: 15.3 % (ref 10–15)
HCT VFR BLD AUTO: 43.6 % (ref 35–47)
HGB BLD-MCNC: 14.6 G/DL (ref 11.7–15.7)
IMM GRANULOCYTES # BLD: 0.1 10E3/UL
IMM GRANULOCYTES NFR BLD: 2 %
LYMPHOCYTES # BLD AUTO: 0.4 10E3/UL (ref 0.8–5.3)
LYMPHOCYTES NFR BLD AUTO: 9 %
MCH RBC QN AUTO: 31.1 PG (ref 26.5–33)
MCHC RBC AUTO-ENTMCNC: 33.5 G/DL (ref 31.5–36.5)
MCV RBC AUTO: 93 FL (ref 78–100)
MONOCYTES # BLD AUTO: 0.2 10E3/UL (ref 0–1.3)
MONOCYTES NFR BLD AUTO: 5 %
NEUTROPHILS # BLD AUTO: 3.5 10E3/UL (ref 1.6–8.3)
NEUTROPHILS NFR BLD AUTO: 83 %
NRBC # BLD AUTO: 0 10E3/UL
NRBC BLD AUTO-RTO: 0 /100
PLATELET # BLD AUTO: 211 10E3/UL (ref 150–450)
RBC # BLD AUTO: 4.69 10E6/UL (ref 3.8–5.2)
WBC # BLD AUTO: 4.2 10E3/UL (ref 4–11)

## 2022-01-25 PROCEDURE — 250N000011 HC RX IP 250 OP 636: Performed by: NURSE PRACTITIONER

## 2022-01-25 PROCEDURE — 85025 COMPLETE CBC W/AUTO DIFF WBC: CPT | Mod: ZL | Performed by: NURSE PRACTITIONER

## 2022-01-25 PROCEDURE — 96401 CHEMO ANTI-NEOPL SQ/IM: CPT

## 2022-01-25 PROCEDURE — 36415 COLL VENOUS BLD VENIPUNCTURE: CPT | Mod: ZL | Performed by: NURSE PRACTITIONER

## 2022-01-25 RX ADMIN — BORTEZOMIB 2.4 MG: 3.5 INJECTION, POWDER, LYOPHILIZED, FOR SOLUTION INTRAVENOUS; SUBCUTANEOUS at 13:51

## 2022-01-25 ASSESSMENT — PAIN SCALES - GENERAL: PAINLEVEL: NO PAIN (0)

## 2022-01-25 ASSESSMENT — MIFFLIN-ST. JEOR: SCORE: 1229

## 2022-01-25 NOTE — PROGRESS NOTES
Patient is 71 years old, here today for injection of Velcade per order of . Patient meets parameters for today's infusion. Patient identified with two identifiers, order verified, and verbal consent for today's infusion obtained from patient. Written consent for treatment is on file and valid.    Component      Latest Ref Rng & Units 1/25/2022   WBC      4.0 - 11.0 10e3/uL 4.2   RBC Count      3.80 - 5.20 10e6/uL 4.69   Hemoglobin      11.7 - 15.7 g/dL 14.6   Hematocrit      35.0 - 47.0 % 43.6   MCV      78 - 100 fL 93   MCH      26.5 - 33.0 pg 31.1   MCHC      31.5 - 36.5 g/dL 33.5   RDW      10.0 - 15.0 % 15.3 (H)   Platelet Count      150 - 450 10e3/uL 211   % Neutrophils      % 83   % Lymphocytes      % 9   % Monocytes      % 5   % Eosinophils      % 1   % Basophils      % 0   % Immature Granulocytes      % 2   NRBCs per 100 WBC      <1 /100 0   Absolute Neutrophils      1.6 - 8.3 10e3/uL 3.5   Absolute Lymphocytes      0.8 - 5.3 10e3/uL 0.4 (L)   Absolute Monocytes      0.0 - 1.3 10e3/uL 0.2   Absolute Eosinophils      0.0 - 0.7 10e3/uL 0.0   Absolute Basophils      0.0 - 0.2 10e3/uL 0.0   Absolute Immature Granulocytes      <=0.4 10e3/uL 0.1   Absolute NRBCs      10e3/uL 0.0       Patient meets order parameters for today's treatment.    Patient denies questions or concerns regarding injection and/or medication(s) being administered.    Independent dose check completed with UZMA Rodríguez.    Velcade injected SQ into left arm at a 45 degree angle per protocol rotating sites. Patient tolerated injection without incident, no signs or symptoms of adverse reaction noted. Patient denies pain or discomfort.     Covered with a sterile bandage. Pt instructed to leave bandage intact for a minimum of one hour, and to call with questions or concerns. Patient states understanding, discharged.

## 2022-01-26 ENCOUNTER — HOSPITAL ENCOUNTER (OUTPATIENT)
Dept: PHYSICAL THERAPY | Facility: HOSPITAL | Age: 72
Setting detail: THERAPIES SERIES
End: 2022-01-26
Attending: NURSE PRACTITIONER
Payer: MEDICARE

## 2022-01-26 PROCEDURE — 97110 THERAPEUTIC EXERCISES: CPT | Mod: GP

## 2022-01-28 ENCOUNTER — INFUSION THERAPY VISIT (OUTPATIENT)
Dept: INFUSION THERAPY | Facility: OTHER | Age: 72
End: 2022-01-28
Attending: INTERNAL MEDICINE
Payer: MEDICARE

## 2022-01-28 VITALS
HEART RATE: 88 BPM | TEMPERATURE: 99.2 F | RESPIRATION RATE: 17 BRPM | DIASTOLIC BLOOD PRESSURE: 78 MMHG | SYSTOLIC BLOOD PRESSURE: 112 MMHG | BODY MASS INDEX: 29.61 KG/M2 | WEIGHT: 167.11 LBS | OXYGEN SATURATION: 94 %

## 2022-01-28 DIAGNOSIS — C90.00 MULTIPLE MYELOMA NOT HAVING ACHIEVED REMISSION (H): Primary | ICD-10-CM

## 2022-01-28 DIAGNOSIS — R53.83 FATIGUE, UNSPECIFIED TYPE: ICD-10-CM

## 2022-01-28 LAB
BASOPHILS # BLD AUTO: 0 10E3/UL (ref 0–0.2)
BASOPHILS NFR BLD AUTO: 0 %
EOSINOPHIL # BLD AUTO: 0.2 10E3/UL (ref 0–0.7)
EOSINOPHIL NFR BLD AUTO: 4 %
ERYTHROCYTE [DISTWIDTH] IN BLOOD BY AUTOMATED COUNT: 14.8 % (ref 10–15)
HCT VFR BLD AUTO: 40.6 % (ref 35–47)
HGB BLD-MCNC: 13.9 G/DL (ref 11.7–15.7)
IMM GRANULOCYTES # BLD: 0.1 10E3/UL
IMM GRANULOCYTES NFR BLD: 2 %
LYMPHOCYTES # BLD AUTO: 1.1 10E3/UL (ref 0.8–5.3)
LYMPHOCYTES NFR BLD AUTO: 24 %
MCH RBC QN AUTO: 31.3 PG (ref 26.5–33)
MCHC RBC AUTO-ENTMCNC: 34.2 G/DL (ref 31.5–36.5)
MCV RBC AUTO: 91 FL (ref 78–100)
MONOCYTES # BLD AUTO: 1 10E3/UL (ref 0–1.3)
MONOCYTES NFR BLD AUTO: 22 %
NEUTROPHILS # BLD AUTO: 2.2 10E3/UL (ref 1.6–8.3)
NEUTROPHILS NFR BLD AUTO: 48 %
NRBC # BLD AUTO: 0 10E3/UL
NRBC BLD AUTO-RTO: 0 /100
PLAT MORPH BLD: NORMAL
PLATELET # BLD AUTO: 178 10E3/UL (ref 150–450)
RBC # BLD AUTO: 4.44 10E6/UL (ref 3.8–5.2)
RBC MORPH BLD: NORMAL
WBC # BLD AUTO: 4.6 10E3/UL (ref 4–11)

## 2022-01-28 PROCEDURE — 96401 CHEMO ANTI-NEOPL SQ/IM: CPT

## 2022-01-28 PROCEDURE — 85025 COMPLETE CBC W/AUTO DIFF WBC: CPT | Mod: ZL

## 2022-01-28 PROCEDURE — 36415 COLL VENOUS BLD VENIPUNCTURE: CPT | Mod: ZL

## 2022-01-28 PROCEDURE — 250N000011 HC RX IP 250 OP 636: Mod: JW | Performed by: NURSE PRACTITIONER

## 2022-01-28 RX ADMIN — BORTEZOMIB 2.4 MG: 3.5 INJECTION, POWDER, LYOPHILIZED, FOR SOLUTION INTRAVENOUS; SUBCUTANEOUS at 13:53

## 2022-01-28 ASSESSMENT — PAIN SCALES - GENERAL: PAINLEVEL: NO PAIN (0)

## 2022-01-28 NOTE — PROGRESS NOTES
Patient reports no significant changes since last velcade on Tuesday dose.  Patient reports being more tired and sluggish.  She is wondering about starting B 12.  Consulted with Provider who agreed to check Vitamin D and Vitamin B levels.  Provider advised that patient should be encouraged to participate in at least 20 minutes of activity to help with fatigue.  Patient reported having 10 minutes daily of PT exercises but will add in some treadmill use daily as well.  Patient did not want to have the labs drawn again (could not be added to the existing sample) Patient will have them drawn next week when she returns.

## 2022-01-28 NOTE — PROGRESS NOTES
Patient is a 71 year old  her today for injection of Velcade per order of . Patient identified with two identifiers, order verified, and verbal consent for today's infusion obtained from patient. Written consent for treatment is on file and valid.    Recent lab values:  Patient labs are with in treatment parameters.  Patient meets order parameters for today's treatment.    Velcade dose verified with Barron RICH RN, prior to release of drug.      Patient denies questions or concerns regarding injection and/or medication(s) being administered.    Velcade injected SQ into right arm at 45 degree angle per protocol rotating sites. Patient tolerated injection without incident, no signs or symptoms of adverse reaction noted. Patient denies pain or discomfort.     Covered with a sterile bandage. Pt instructed to leave bandage intact for a minimum of one hour, and to call with questions or concerns. Copy of appointments, discharge instructions, and after visit summary (AVS) provided to patient. Patient states understanding, discharged.

## 2022-02-01 ENCOUNTER — LAB (OUTPATIENT)
Dept: LAB | Facility: OTHER | Age: 72
End: 2022-02-01
Attending: INTERNAL MEDICINE
Payer: MEDICARE

## 2022-02-01 DIAGNOSIS — C90.00 MULTIPLE MYELOMA NOT HAVING ACHIEVED REMISSION (H): ICD-10-CM

## 2022-02-01 DIAGNOSIS — C90.00 MULTIPLE MYELOMA NOT HAVING ACHIEVED REMISSION (H): Primary | ICD-10-CM

## 2022-02-01 PROCEDURE — 82232 ASSAY OF BETA-2 PROTEIN: CPT | Mod: ZL

## 2022-02-01 PROCEDURE — 84165 PROTEIN E-PHORESIS SERUM: CPT | Mod: ZL | Performed by: PATHOLOGY

## 2022-02-01 PROCEDURE — 82784 ASSAY IGA/IGD/IGG/IGM EACH: CPT | Mod: ZL

## 2022-02-01 PROCEDURE — 85810 BLOOD VISCOSITY EXAMINATION: CPT | Mod: ZL

## 2022-02-01 PROCEDURE — 36415 COLL VENOUS BLD VENIPUNCTURE: CPT | Mod: ZL

## 2022-02-01 PROCEDURE — 84155 ASSAY OF PROTEIN SERUM: CPT | Mod: ZL

## 2022-02-01 PROCEDURE — 86334 IMMUNOFIX E-PHORESIS SERUM: CPT | Mod: ZL

## 2022-02-01 PROCEDURE — 83521 IG LIGHT CHAINS FREE EACH: CPT | Mod: ZL

## 2022-02-02 LAB
ALBUMIN SERPL ELPH-MCNC: 4.3 G/DL (ref 3.7–5.1)
ALPHA1 GLOB SERPL ELPH-MCNC: 0.3 G/DL (ref 0.2–0.4)
ALPHA2 GLOB SERPL ELPH-MCNC: 0.7 G/DL (ref 0.5–0.9)
B-GLOBULIN SERPL ELPH-MCNC: 0.7 G/DL (ref 0.6–1)
B2 MICROGLOB TUMOR MARKER SER-MCNC: 3.7 MG/L
GAMMA GLOB SERPL ELPH-MCNC: 0.7 G/DL (ref 0.7–1.6)
IGA SERPL-MCNC: 11 MG/DL (ref 84–499)
IGG SERPL-MCNC: 708 MG/DL (ref 610–1616)
IGM SERPL-MCNC: 15 MG/DL (ref 35–242)
KAPPA LC FREE SER-MCNC: 0.51 MG/DL (ref 0.33–1.94)
KAPPA LC FREE/LAMBDA FREE SER NEPH: 2.22 {RATIO} (ref 0.26–1.65)
LAMBDA LC FREE SERPL-MCNC: 0.23 MG/DL (ref 0.57–2.63)
M PROTEIN SERPL ELPH-MCNC: 0.3 G/DL
PROT PATTERN SERPL ELPH-IMP: ABNORMAL
PROT PATTERN SERPL IFE-IMP: NORMAL
TOTAL PROTEIN SERUM FOR ELP: 6.8 G/DL (ref 6.8–8.8)
VISC SER: 1.6 CP (ref 1.4–1.8)

## 2022-02-02 PROCEDURE — 84165 PROTEIN E-PHORESIS SERUM: CPT | Mod: 26 | Performed by: PATHOLOGY

## 2022-02-02 PROCEDURE — 86334 IMMUNOFIX E-PHORESIS SERUM: CPT | Performed by: PATHOLOGY

## 2022-02-08 ENCOUNTER — INFUSION THERAPY VISIT (OUTPATIENT)
Dept: INFUSION THERAPY | Facility: OTHER | Age: 72
End: 2022-02-08
Attending: INTERNAL MEDICINE
Payer: MEDICARE

## 2022-02-08 VITALS
OXYGEN SATURATION: 95 % | HEART RATE: 90 BPM | RESPIRATION RATE: 18 BRPM | WEIGHT: 168.43 LBS | TEMPERATURE: 99.2 F | DIASTOLIC BLOOD PRESSURE: 75 MMHG | HEIGHT: 63 IN | SYSTOLIC BLOOD PRESSURE: 112 MMHG | BODY MASS INDEX: 29.84 KG/M2

## 2022-02-08 DIAGNOSIS — C90.00 MULTIPLE MYELOMA NOT HAVING ACHIEVED REMISSION (H): Primary | ICD-10-CM

## 2022-02-08 LAB
ALBUMIN SERPL-MCNC: 3.6 G/DL (ref 3.4–5)
ALP SERPL-CCNC: 121 U/L (ref 40–150)
ALT SERPL W P-5'-P-CCNC: 17 U/L (ref 0–50)
ANION GAP SERPL CALCULATED.3IONS-SCNC: 5 MMOL/L (ref 3–14)
AST SERPL W P-5'-P-CCNC: 12 U/L (ref 0–45)
BASOPHILS # BLD AUTO: 0 10E3/UL (ref 0–0.2)
BASOPHILS NFR BLD AUTO: 0 %
BILIRUB SERPL-MCNC: 0.3 MG/DL (ref 0.2–1.3)
BUN SERPL-MCNC: 20 MG/DL (ref 7–30)
CALCIUM SERPL-MCNC: 9.3 MG/DL (ref 8.5–10.1)
CHLORIDE BLD-SCNC: 104 MMOL/L (ref 94–109)
CO2 SERPL-SCNC: 28 MMOL/L (ref 20–32)
CREAT SERPL-MCNC: 0.79 MG/DL (ref 0.52–1.04)
EOSINOPHIL # BLD AUTO: 0 10E3/UL (ref 0–0.7)
EOSINOPHIL NFR BLD AUTO: 1 %
ERYTHROCYTE [DISTWIDTH] IN BLOOD BY AUTOMATED COUNT: 14.6 % (ref 10–15)
GFR SERPL CREATININE-BSD FRML MDRD: 79 ML/MIN/1.73M2
GLUCOSE BLD-MCNC: 163 MG/DL (ref 70–99)
HCT VFR BLD AUTO: 42.2 % (ref 35–47)
HGB BLD-MCNC: 14.1 G/DL (ref 11.7–15.7)
IMM GRANULOCYTES # BLD: 0.1 10E3/UL
IMM GRANULOCYTES NFR BLD: 2 %
LYMPHOCYTES # BLD AUTO: 0.3 10E3/UL (ref 0.8–5.3)
LYMPHOCYTES NFR BLD AUTO: 7 %
MCH RBC QN AUTO: 30.9 PG (ref 26.5–33)
MCHC RBC AUTO-ENTMCNC: 33.4 G/DL (ref 31.5–36.5)
MCV RBC AUTO: 93 FL (ref 78–100)
MONOCYTES # BLD AUTO: 0.2 10E3/UL (ref 0–1.3)
MONOCYTES NFR BLD AUTO: 3 %
NEUTROPHILS # BLD AUTO: 4.1 10E3/UL (ref 1.6–8.3)
NEUTROPHILS NFR BLD AUTO: 87 %
NRBC # BLD AUTO: 0 10E3/UL
NRBC BLD AUTO-RTO: 0 /100
PLATELET # BLD AUTO: 239 10E3/UL (ref 150–450)
POTASSIUM BLD-SCNC: 3.7 MMOL/L (ref 3.4–5.3)
PROT SERPL-MCNC: 7 G/DL (ref 6.8–8.8)
RBC # BLD AUTO: 4.56 10E6/UL (ref 3.8–5.2)
SODIUM SERPL-SCNC: 137 MMOL/L (ref 133–144)
WBC # BLD AUTO: 4.8 10E3/UL (ref 4–11)

## 2022-02-08 PROCEDURE — 250N000011 HC RX IP 250 OP 636: Performed by: INTERNAL MEDICINE

## 2022-02-08 PROCEDURE — 96372 THER/PROPH/DIAG INJ SC/IM: CPT | Mod: 59

## 2022-02-08 PROCEDURE — 96401 CHEMO ANTI-NEOPL SQ/IM: CPT

## 2022-02-08 PROCEDURE — 82040 ASSAY OF SERUM ALBUMIN: CPT | Mod: ZL | Performed by: INTERNAL MEDICINE

## 2022-02-08 PROCEDURE — 36415 COLL VENOUS BLD VENIPUNCTURE: CPT | Mod: ZL | Performed by: INTERNAL MEDICINE

## 2022-02-08 PROCEDURE — 85014 HEMATOCRIT: CPT | Mod: ZL | Performed by: INTERNAL MEDICINE

## 2022-02-08 PROCEDURE — 250N000013 HC RX MED GY IP 250 OP 250 PS 637: Performed by: INTERNAL MEDICINE

## 2022-02-08 PROCEDURE — 80053 COMPREHEN METABOLIC PANEL: CPT | Mod: ZL | Performed by: INTERNAL MEDICINE

## 2022-02-08 RX ORDER — NALOXONE HYDROCHLORIDE 0.4 MG/ML
.1-.4 INJECTION, SOLUTION INTRAMUSCULAR; INTRAVENOUS; SUBCUTANEOUS
Status: CANCELLED | OUTPATIENT
Start: 2022-02-10

## 2022-02-08 RX ORDER — ALBUTEROL SULFATE 0.83 MG/ML
2.5 SOLUTION RESPIRATORY (INHALATION)
Status: CANCELLED | OUTPATIENT
Start: 2022-02-08

## 2022-02-08 RX ORDER — ACETAMINOPHEN 325 MG/1
650 TABLET ORAL ONCE
Status: COMPLETED | OUTPATIENT
Start: 2022-02-08 | End: 2022-02-08

## 2022-02-08 RX ORDER — HEPARIN SODIUM (PORCINE) LOCK FLUSH IV SOLN 100 UNIT/ML 100 UNIT/ML
5 SOLUTION INTRAVENOUS
Status: CANCELLED | OUTPATIENT
Start: 2022-02-18

## 2022-02-08 RX ORDER — SODIUM CHLORIDE 9 MG/ML
1000 INJECTION, SOLUTION INTRAVENOUS CONTINUOUS PRN
Status: CANCELLED
Start: 2022-02-18

## 2022-02-08 RX ORDER — DIPHENHYDRAMINE HYDROCHLORIDE 50 MG/ML
50 INJECTION INTRAMUSCULAR; INTRAVENOUS
Status: CANCELLED
Start: 2022-02-10

## 2022-02-08 RX ORDER — DIPHENHYDRAMINE HYDROCHLORIDE 50 MG/ML
50 INJECTION INTRAMUSCULAR; INTRAVENOUS
Status: CANCELLED
Start: 2022-02-15

## 2022-02-08 RX ORDER — LORAZEPAM 2 MG/ML
0.5 INJECTION INTRAMUSCULAR EVERY 4 HOURS PRN
Status: CANCELLED | OUTPATIENT
Start: 2022-02-08

## 2022-02-08 RX ORDER — DIPHENHYDRAMINE HYDROCHLORIDE 50 MG/ML
50 INJECTION INTRAMUSCULAR; INTRAVENOUS
Status: CANCELLED
Start: 2022-02-08

## 2022-02-08 RX ORDER — ALBUTEROL SULFATE 0.83 MG/ML
2.5 SOLUTION RESPIRATORY (INHALATION)
Status: CANCELLED | OUTPATIENT
Start: 2022-02-10

## 2022-02-08 RX ORDER — METHYLPREDNISOLONE SODIUM SUCCINATE 125 MG/2ML
125 INJECTION, POWDER, LYOPHILIZED, FOR SOLUTION INTRAMUSCULAR; INTRAVENOUS
Status: CANCELLED
Start: 2022-02-18

## 2022-02-08 RX ORDER — ALBUTEROL SULFATE 90 UG/1
1-2 AEROSOL, METERED RESPIRATORY (INHALATION)
Status: CANCELLED
Start: 2022-02-18

## 2022-02-08 RX ORDER — MEPERIDINE HYDROCHLORIDE 25 MG/ML
25 INJECTION INTRAMUSCULAR; INTRAVENOUS; SUBCUTANEOUS EVERY 30 MIN PRN
Status: CANCELLED | OUTPATIENT
Start: 2022-02-15

## 2022-02-08 RX ORDER — ALBUTEROL SULFATE 90 UG/1
1-2 AEROSOL, METERED RESPIRATORY (INHALATION)
Status: CANCELLED
Start: 2022-02-08

## 2022-02-08 RX ORDER — ALBUTEROL SULFATE 90 UG/1
1-2 AEROSOL, METERED RESPIRATORY (INHALATION)
Status: CANCELLED
Start: 2022-02-15

## 2022-02-08 RX ORDER — MEPERIDINE HYDROCHLORIDE 25 MG/ML
25 INJECTION INTRAMUSCULAR; INTRAVENOUS; SUBCUTANEOUS EVERY 30 MIN PRN
Status: CANCELLED | OUTPATIENT
Start: 2022-02-10

## 2022-02-08 RX ORDER — HEPARIN SODIUM,PORCINE 10 UNIT/ML
5 VIAL (ML) INTRAVENOUS
Status: CANCELLED | OUTPATIENT
Start: 2022-02-08

## 2022-02-08 RX ORDER — EPINEPHRINE 1 MG/ML
0.3 INJECTION, SOLUTION, CONCENTRATE INTRAVENOUS EVERY 5 MIN PRN
Status: CANCELLED | OUTPATIENT
Start: 2022-02-08

## 2022-02-08 RX ORDER — HEPARIN SODIUM (PORCINE) LOCK FLUSH IV SOLN 100 UNIT/ML 100 UNIT/ML
5 SOLUTION INTRAVENOUS
Status: CANCELLED | OUTPATIENT
Start: 2022-02-08

## 2022-02-08 RX ORDER — NALOXONE HYDROCHLORIDE 0.4 MG/ML
.1-.4 INJECTION, SOLUTION INTRAMUSCULAR; INTRAVENOUS; SUBCUTANEOUS
Status: CANCELLED | OUTPATIENT
Start: 2022-02-08

## 2022-02-08 RX ORDER — METHYLPREDNISOLONE SODIUM SUCCINATE 125 MG/2ML
125 INJECTION, POWDER, LYOPHILIZED, FOR SOLUTION INTRAMUSCULAR; INTRAVENOUS
Status: CANCELLED
Start: 2022-02-08

## 2022-02-08 RX ORDER — EPINEPHRINE 1 MG/ML
0.3 INJECTION, SOLUTION, CONCENTRATE INTRAVENOUS EVERY 5 MIN PRN
Status: CANCELLED | OUTPATIENT
Start: 2022-02-18

## 2022-02-08 RX ORDER — SODIUM CHLORIDE 9 MG/ML
1000 INJECTION, SOLUTION INTRAVENOUS CONTINUOUS PRN
Status: CANCELLED
Start: 2022-02-08

## 2022-02-08 RX ORDER — SODIUM CHLORIDE 9 MG/ML
1000 INJECTION, SOLUTION INTRAVENOUS CONTINUOUS PRN
Status: CANCELLED
Start: 2022-02-15

## 2022-02-08 RX ORDER — HEPARIN SODIUM,PORCINE 10 UNIT/ML
5 VIAL (ML) INTRAVENOUS
Status: CANCELLED | OUTPATIENT
Start: 2022-02-18

## 2022-02-08 RX ORDER — EPINEPHRINE 1 MG/ML
0.3 INJECTION, SOLUTION, CONCENTRATE INTRAVENOUS EVERY 5 MIN PRN
Status: CANCELLED | OUTPATIENT
Start: 2022-02-15

## 2022-02-08 RX ORDER — LORAZEPAM 2 MG/ML
0.5 INJECTION INTRAMUSCULAR EVERY 4 HOURS PRN
Status: CANCELLED | OUTPATIENT
Start: 2022-02-18

## 2022-02-08 RX ORDER — NALOXONE HYDROCHLORIDE 0.4 MG/ML
.1-.4 INJECTION, SOLUTION INTRAMUSCULAR; INTRAVENOUS; SUBCUTANEOUS
Status: CANCELLED | OUTPATIENT
Start: 2022-02-18

## 2022-02-08 RX ORDER — ALBUTEROL SULFATE 90 UG/1
1-2 AEROSOL, METERED RESPIRATORY (INHALATION)
Status: CANCELLED
Start: 2022-02-10

## 2022-02-08 RX ORDER — DIPHENHYDRAMINE HCL 50 MG
50 CAPSULE ORAL ONCE
Status: COMPLETED | OUTPATIENT
Start: 2022-02-08 | End: 2022-02-08

## 2022-02-08 RX ORDER — MEPERIDINE HYDROCHLORIDE 25 MG/ML
25 INJECTION INTRAMUSCULAR; INTRAVENOUS; SUBCUTANEOUS EVERY 30 MIN PRN
Status: CANCELLED | OUTPATIENT
Start: 2022-02-18

## 2022-02-08 RX ORDER — HEPARIN SODIUM,PORCINE 10 UNIT/ML
5 VIAL (ML) INTRAVENOUS
Status: CANCELLED | OUTPATIENT
Start: 2022-02-15

## 2022-02-08 RX ORDER — LORAZEPAM 2 MG/ML
0.5 INJECTION INTRAMUSCULAR EVERY 4 HOURS PRN
Status: CANCELLED | OUTPATIENT
Start: 2022-02-15

## 2022-02-08 RX ORDER — HEPARIN SODIUM,PORCINE 10 UNIT/ML
5 VIAL (ML) INTRAVENOUS
Status: CANCELLED | OUTPATIENT
Start: 2022-02-10

## 2022-02-08 RX ORDER — METHYLPREDNISOLONE SODIUM SUCCINATE 125 MG/2ML
125 INJECTION, POWDER, LYOPHILIZED, FOR SOLUTION INTRAMUSCULAR; INTRAVENOUS
Status: CANCELLED
Start: 2022-02-15

## 2022-02-08 RX ORDER — ALBUTEROL SULFATE 0.83 MG/ML
2.5 SOLUTION RESPIRATORY (INHALATION)
Status: CANCELLED | OUTPATIENT
Start: 2022-02-18

## 2022-02-08 RX ORDER — EPINEPHRINE 1 MG/ML
0.3 INJECTION, SOLUTION, CONCENTRATE INTRAVENOUS EVERY 5 MIN PRN
Status: CANCELLED | OUTPATIENT
Start: 2022-02-10

## 2022-02-08 RX ORDER — ALBUTEROL SULFATE 0.83 MG/ML
2.5 SOLUTION RESPIRATORY (INHALATION)
Status: CANCELLED | OUTPATIENT
Start: 2022-02-15

## 2022-02-08 RX ORDER — HEPARIN SODIUM (PORCINE) LOCK FLUSH IV SOLN 100 UNIT/ML 100 UNIT/ML
5 SOLUTION INTRAVENOUS
Status: CANCELLED | OUTPATIENT
Start: 2022-02-15

## 2022-02-08 RX ORDER — HEPARIN SODIUM (PORCINE) LOCK FLUSH IV SOLN 100 UNIT/ML 100 UNIT/ML
5 SOLUTION INTRAVENOUS
Status: CANCELLED | OUTPATIENT
Start: 2022-02-10

## 2022-02-08 RX ORDER — LORAZEPAM 2 MG/ML
0.5 INJECTION INTRAMUSCULAR EVERY 4 HOURS PRN
Status: CANCELLED | OUTPATIENT
Start: 2022-02-10

## 2022-02-08 RX ORDER — DIPHENHYDRAMINE HYDROCHLORIDE 50 MG/ML
50 INJECTION INTRAMUSCULAR; INTRAVENOUS
Status: CANCELLED
Start: 2022-02-18

## 2022-02-08 RX ORDER — METHYLPREDNISOLONE SODIUM SUCCINATE 125 MG/2ML
125 INJECTION, POWDER, LYOPHILIZED, FOR SOLUTION INTRAMUSCULAR; INTRAVENOUS
Status: CANCELLED
Start: 2022-02-10

## 2022-02-08 RX ORDER — NALOXONE HYDROCHLORIDE 0.4 MG/ML
.1-.4 INJECTION, SOLUTION INTRAMUSCULAR; INTRAVENOUS; SUBCUTANEOUS
Status: CANCELLED | OUTPATIENT
Start: 2022-02-15

## 2022-02-08 RX ORDER — SODIUM CHLORIDE 9 MG/ML
1000 INJECTION, SOLUTION INTRAVENOUS CONTINUOUS PRN
Status: CANCELLED
Start: 2022-02-10

## 2022-02-08 RX ORDER — MEPERIDINE HYDROCHLORIDE 25 MG/ML
25 INJECTION INTRAMUSCULAR; INTRAVENOUS; SUBCUTANEOUS EVERY 30 MIN PRN
Status: CANCELLED | OUTPATIENT
Start: 2022-02-08

## 2022-02-08 RX ADMIN — BORTEZOMIB 2.4 MG: 3.5 INJECTION, POWDER, LYOPHILIZED, FOR SOLUTION INTRAVENOUS; SUBCUTANEOUS at 14:28

## 2022-02-08 RX ADMIN — DIPHENHYDRAMINE HYDROCHLORIDE 50 MG: 50 CAPSULE ORAL at 14:04

## 2022-02-08 RX ADMIN — ACETAMINOPHEN 650 MG: 325 TABLET, FILM COATED ORAL at 14:04

## 2022-02-08 RX ADMIN — DARATUMUMAB AND HYALURONIDASE-FIHJ (HUMAN RECOMBINANT) 1800 MG: 1800; 30000 INJECTION SUBCUTANEOUS at 14:30

## 2022-02-08 ASSESSMENT — MIFFLIN-ST. JEOR: SCORE: 1238

## 2022-02-08 ASSESSMENT — PAIN SCALES - GENERAL: PAINLEVEL: NO PAIN (0)

## 2022-02-08 NOTE — PROGRESS NOTES
Patient is 71 years old, here today accompanied by self for injection of FASPRO and Velcade per order of Dr Walker.     Component      Latest Ref Rng & Units 2/8/2022   WBC      4.0 - 11.0 10e3/uL 4.8   RBC Count      3.80 - 5.20 10e6/uL 4.56   Hemoglobin      11.7 - 15.7 g/dL 14.1   Hematocrit      35.0 - 47.0 % 42.2   MCV      78 - 100 fL 93   MCH      26.5 - 33.0 pg 30.9   MCHC      31.5 - 36.5 g/dL 33.4   RDW      10.0 - 15.0 % 14.6   Platelet Count      150 - 450 10e3/uL 239   % Neutrophils      % 87   % Lymphocytes      % 7   % Monocytes      % 3   % Eosinophils      % 1   % Basophils      % 0   % Immature Granulocytes      % 2   NRBCs per 100 WBC      <1 /100 0   Absolute Neutrophils      1.6 - 8.3 10e3/uL 4.1   Absolute Lymphocytes      0.8 - 5.3 10e3/uL 0.3 (L)   Absolute Monocytes      0.0 - 1.3 10e3/uL 0.2   Absolute Eosinophils      0.0 - 0.7 10e3/uL 0.0   Absolute Basophils      0.0 - 0.2 10e3/uL 0.0   Absolute Immature Granulocytes      <=0.4 10e3/uL 0.1   Absolute NRBCs      10e3/uL 0.0   Sodium      133 - 144 mmol/L 137   Potassium      3.4 - 5.3 mmol/L 3.7   Chloride      94 - 109 mmol/L 104   Carbon Dioxide      20 - 32 mmol/L 28   Anion Gap      3 - 14 mmol/L 5   Urea Nitrogen      7 - 30 mg/dL 20   Creatinine      0.52 - 1.04 mg/dL 0.79   Calcium      8.5 - 10.1 mg/dL 9.3   Glucose      70 - 99 mg/dL 163 (H)   Alkaline Phosphatase      40 - 150 U/L 121   AST      0 - 45 U/L 12   ALT      0 - 50 U/L 17   Protein Total      6.8 - 8.8 g/dL 7.0   Albumin      3.4 - 5.0 g/dL 3.6   Bilirubin Total      0.2 - 1.3 mg/dL 0.3   GFR Estimate      >60 mL/min/1.73m2 79     Patient denies questions nor concerns regarding medication, administration site, side effects, nor aftercare.  Patient identified with two identifiers, order verified, and verbal consent for today's injection obtained from patient.   Patient education provided on s/s of injection site infection, and/or medication specific side effects,  and when to call a provider.  Patient instructed to report any adverse effects.     Velcade administered per protocol in left upper outer arm at a 45 degree angle.  Site covered with sterile bandage.  Patient tolerated injection well, no verbal nor non-verbal signs of discomfort noted.  No adverse effects noted at this time.     FASPRO administered per protocol in right upper abdomen at a 45 degree angle.  Site covered with sterile bandage.  Patient tolerated injection well, no verbal nor non-verbal signs of discomfort noted.  No adverse effects noted at this time.     Patient instructed to call with further questions or concerns.  Patient states understanding and is in agreement with this plan. Patient discharged.

## 2022-02-09 DIAGNOSIS — H91.93 DECREASED HEARING OF BOTH EARS: Primary | ICD-10-CM

## 2022-02-11 ENCOUNTER — INFUSION THERAPY VISIT (OUTPATIENT)
Dept: INFUSION THERAPY | Facility: OTHER | Age: 72
End: 2022-02-11
Attending: INTERNAL MEDICINE
Payer: MEDICARE

## 2022-02-11 ENCOUNTER — OFFICE VISIT (OUTPATIENT)
Dept: OTOLARYNGOLOGY | Facility: OTHER | Age: 72
End: 2022-02-11
Attending: PHYSICIAN ASSISTANT
Payer: MEDICARE

## 2022-02-11 VITALS
SYSTOLIC BLOOD PRESSURE: 112 MMHG | WEIGHT: 167.77 LBS | OXYGEN SATURATION: 98 % | HEART RATE: 90 BPM | BODY MASS INDEX: 29.73 KG/M2 | RESPIRATION RATE: 18 BRPM | HEIGHT: 63 IN | DIASTOLIC BLOOD PRESSURE: 72 MMHG | TEMPERATURE: 98.4 F

## 2022-02-11 VITALS
WEIGHT: 160 LBS | TEMPERATURE: 97.7 F | HEIGHT: 62 IN | HEART RATE: 90 BPM | OXYGEN SATURATION: 98 % | SYSTOLIC BLOOD PRESSURE: 112 MMHG | DIASTOLIC BLOOD PRESSURE: 72 MMHG | BODY MASS INDEX: 29.44 KG/M2

## 2022-02-11 DIAGNOSIS — H90.A21 SENSORINEURAL HEARING LOSS (SNHL) OF RIGHT EAR WITH RESTRICTED HEARING OF LEFT EAR: ICD-10-CM

## 2022-02-11 DIAGNOSIS — Z98.890 H/O MYRINGOTOMY: ICD-10-CM

## 2022-02-11 DIAGNOSIS — H90.A32 MIXED CONDUCTIVE AND SENSORINEURAL HEARING LOSS OF LEFT EAR WITH RESTRICTED HEARING OF RIGHT EAR: ICD-10-CM

## 2022-02-11 DIAGNOSIS — C90.00 MULTIPLE MYELOMA NOT HAVING ACHIEVED REMISSION (H): Primary | ICD-10-CM

## 2022-02-11 DIAGNOSIS — H72.92 PERFORATION OF TYMPANIC MEMBRANE, LEFT: Primary | ICD-10-CM

## 2022-02-11 PROCEDURE — 92504 EAR MICROSCOPY EXAMINATION: CPT

## 2022-02-11 PROCEDURE — 92504 EAR MICROSCOPY EXAMINATION: CPT | Performed by: PHYSICIAN ASSISTANT

## 2022-02-11 PROCEDURE — G0463 HOSPITAL OUTPT CLINIC VISIT: HCPCS | Mod: 25

## 2022-02-11 PROCEDURE — 250N000011 HC RX IP 250 OP 636: Performed by: INTERNAL MEDICINE

## 2022-02-11 PROCEDURE — 99213 OFFICE O/P EST LOW 20 MIN: CPT | Mod: 25 | Performed by: PHYSICIAN ASSISTANT

## 2022-02-11 PROCEDURE — 96402 CHEMO HORMON ANTINEOPL SQ/IM: CPT

## 2022-02-11 PROCEDURE — 96401 CHEMO ANTI-NEOPL SQ/IM: CPT

## 2022-02-11 RX ADMIN — BORTEZOMIB 2.4 MG: 3.5 INJECTION, POWDER, LYOPHILIZED, FOR SOLUTION INTRAVENOUS; SUBCUTANEOUS at 10:11

## 2022-02-11 ASSESSMENT — MIFFLIN-ST. JEOR
SCORE: 1184.01
SCORE: 1234.97

## 2022-02-11 ASSESSMENT — PAIN SCALES - GENERAL: PAINLEVEL: NO PAIN (0)

## 2022-02-11 NOTE — NURSING NOTE
"Chief Complaint   Patient presents with     Ear Problem     Follow up left TM perforation.       Initial /72 (BP Location: Right arm, Patient Position: Sitting, Cuff Size: Adult Regular)   Pulse 90   Ht 1.575 m (5' 2\")   Wt 72.6 kg (160 lb)   SpO2 98%   BMI 29.26 kg/m   Estimated body mass index is 29.26 kg/m  as calculated from the following:    Height as of this encounter: 1.575 m (5' 2\").    Weight as of this encounter: 72.6 kg (160 lb).  Medication Reconciliation: complete  PAULY HELLER LPN  "

## 2022-02-11 NOTE — PROGRESS NOTES
Patient is a 72 y/o female here today for injection of Velcade per order of . Patient meets parameters for today's infusion. Patient identified with two identifiers, order verified, and verbal consent for today's infusion obtained from patient. Written consent for treatment is on file and valid.    Recent lab values: WBC: 4.8, HGB: 14.1, PLT: 239  ANC: 4.1. Patient meets order parameters for today's treatment.  Patient denies questions or concerns regarding injection and/or medication(s) being administered.  Velcade injected SQ into right upper arm at 45 degrees  per protocol rotating sites.     Injection administered per protocol. Patient tolerated infusion without incident, no signs or symptoms of adverse reaction noted. Patient denies pain or discomfort.     Covered with a sterile bandage. Pt instructed to leave bandage intact for a minimum of one hour, and to call with questions or concerns. Copy of appointments, discharge instructions, and after visit summary (AVS) provided to patient. Patient states understanding, discharged ambulatory.

## 2022-02-11 NOTE — PROGRESS NOTES
Chief Complaint   Patient presents with     Ear Problem     Follow up left TM perforation.       Patient returns to ENT for follow up and was last seen on 8/11/21 for Left TM perforation. Right ear remained stable without an effusion. Recommended observation at this time and water precautions.      Today, she returns for ear exam.   She has felt some discomfort with her left ear at times, and does clear with massage.   She has no otorrhea. She has been using her aids without concerns. No recent ear infections.   Hearing has remained stable.   She has been in chemo twice a week. Reports no blanka concerns.      Denies otalgia, otorrhea  Denies worrisome tinnitus  Denies fluctuating hearing loss or tinnitus.   Denies vertigo or facial paraesthesia.         Audiogram- 2/11/21  Tympanograms  Type A Right   Type B, large volume left.    Thresholds are improved right 250 Hz sloping to stable mild to severe SNHL and  Left ear decreased moderate to severe mixed loss.   SRT=PTA  WRS-  Right- 100%@70dB  Left- 88% @70 dB     BTTI placed on 9/20/19 by Dr. Ley in office      Past Medical History:   Diagnosis Date     Arthritis      Depressive disorder      Diabetes mellitus, type 2 (H) 1/18/2021     Essential hypertension 10/1/2015     Major depressive disorder, recurrent episode, mild (H) 10/1/2015     Mixed hyperlipidemia 10/1/2015     Multiple myeloma not having achieved remission (H) 6/24/2019     Other specified disorders of bladder 07/09/2012    Irritable Bladder     Seasonal allergies 10/1/2015     Unspecified essential hypertension 03/19/2007     Unspecified sinusitis (chronic) 09/05/2007        Allergies   Allergen Reactions     Lisinopril Cough     Phenylephrine Hcl Other (See Comments)     **Entex  HEADACHE (SEVERE)     Phenylpropanolamine Other (See Comments)     **Entex  HEADACHE (SEVERE)     Pseudoephedrine Tannate Other (See Comments)     **Entex  HEADACHE (SEVERE)     Levofloxacin Rash     **Levaquin      "Moxifloxacin Hcl [Avelox] Rash     Current Outpatient Medications   Medication     acyclovir (ZOVIRAX) 400 MG tablet     aspirin (ASA) 81 MG chewable tablet     atorvastatin (LIPITOR) 10 MG tablet     dexamethasone (DECADRON) 4 MG tablet     EPINEPHrine (EPIPEN) 0.3 MG/0.3ML injection     fluticasone (FLONASE) 50 MCG/ACT nasal spray     L-Lysine HCl 500 MG CAPS     losartan (COZAAR) 50 MG tablet     prochlorperazine (COMPAZINE) 10 MG tablet     sertraline (ZOLOFT) 50 MG tablet     No current facility-administered medications for this visit.     ROS- SEE HPI  /72 (BP Location: Right arm, Patient Position: Sitting, Cuff Size: Adult Regular)   Pulse 90   Temp 97.7  F (36.5  C)   Ht 1.575 m (5' 2\")   Wt 72.6 kg (160 lb)   SpO2 98%   BMI 29.26 kg/m      General - The patient is well nourished and well developed, and appears to have good nutritional status.    Head and Face - Normocephalic and atraumatic, with no gross asymmetry noted of the contour of the facial features.  The facial nerve is intact, with strong symmetric movements.  Eyes - Extraocular movements intact, and the pupils were reactive to light.  Sclera were not icteric or injected, conjunctiva were pink and moist.  Mouth - Examination of the oral cavity shows pink, healthy, moist mucosa.  No lesions or ulceration noted.  The dentition are in good repair.  The tongue is mobile and midline.  Ears - Examination of the ears showed- Right canal clear. Right TM is intact without effusion or perforation. Ears examined under otologic microscopy. Canal cleaned with cupped forceps.   Left TM is with perforation, central anterior about 15%. ME appears healthy and dry.   Eyes - Extraocular movements intact, and the pupils were reactive to light.  Sclera were not icteric or injected, conjunctiva were pink and moist.  Mouth - Examination of the oral cavity showed pink, healthy oral mucosa. No lesions or ulcerations noted.  The tongue was mobile and midline, " and the dentition were in good condition.    Throat - The walls of the oropharynx were smooth, pink, moist, symmetric, and had no lesions or ulcerations.  The tonsillar pillars and soft palate were symmetric.  The uvula was midline on elevation.    Neck - Normal midline excursion of the laryngotracheal complex during swallowing.  Full range of motion on passive movement.  Palpation of the occipital, submental, submandibular, internal jugular chain, and supraclavicular nodes did not demonstrate any abnormal lymph nodes or masses.  Palpation of the thyroid was soft and smooth, with no nodules or goiter appreciated.  The trachea was mobile and midline.          ASSESSMENT/ PLAN:      ICD-10-CM    1. Perforation of tympanic membrane, left  H72.92    2. Sensorineural hearing loss (SNHL) of right ear with restricted hearing of left ear  H90.A21    3. Mixed conductive and sensorineural hearing loss of left ear with restricted hearing of right ear  H90.A32    4. H/O myringotomy w/ tube placement  Z98.890        Follow up with Audiology for audiogram and Hearing aid check.   Ears were cleaned. Removed cerumen.  Left Perforation remains   Continue w/ water precautions.   She may continue with observation vs. Surgical repair at this juncture. Discussed surgery and expected outcomes. She will continue with observation.     Recheck ears in 6 months.       Bridgette Ly PA-C  ENT  [facility]

## 2022-02-11 NOTE — PATIENT INSTRUCTIONS
Follow up with Audiology for audiogram and Hearing aid check.   Ears were cleaned. Removed cerumen.  Left Perforation remains   Continue w/ water precautions.     Recheck ears in 6 months.     Thank you for allowing Bridgette Ly PA-C and our ENT team to participate in your care.  If your medications are too expensive, please give the nurse a call.  We can possibly change this medication.  If you have a scheduling or an appointment question please contact our Health Unit Coordinator at [phone], Ext. [phone].    ALL nursing questions or concerns can be directed to your ENT nurse at: [phone] [place]

## 2022-02-11 NOTE — LETTER
2/11/2022         RE: Carmelita Watts  [address] [facility] [place] [address]        Dear Colleague,    Thank you for referring your patient, Carmelita Watts, to the [facility]. Please see a copy of my visit note below.    Chief Complaint   Patient presents with     Ear Problem     Follow up left TM perforation.       Patient returns to ENT for follow up and was last seen on 8/11/21 for Left TM perforation. Right ear remained stable without an effusion. Recommended observation at this time and water precautions.      Today, she returns for ear exam.   She has felt some discomfort with her left ear at times, and does clear with massage.   She has no otorrhea. She has been using her aids without concerns. No recent ear infections.   Hearing has remained stable.   She has been in chemo twice a week. Reports no blanka concerns.      Denies otalgia, otorrhea  Denies worrisome tinnitus  Denies fluctuating hearing loss or tinnitus.   Denies vertigo or facial paraesthesia.         Audiogram- 2/11/21  Tympanograms  Type A Right   Type B, large volume left.    Thresholds are improved right 250 Hz sloping to stable mild to severe SNHL and  Left ear decreased moderate to severe mixed loss.   SRT=PTA  WRS-  Right- 100%@70dB  Left- 88% @70 dB     BTTI placed on 9/20/19 by Dr. Ley in office      Past Medical History:   Diagnosis Date     Arthritis      Depressive disorder      Diabetes mellitus, type 2 (H) 1/18/2021     Essential hypertension 10/1/2015     Major depressive disorder, recurrent episode, mild (H) 10/1/2015     Mixed hyperlipidemia 10/1/2015     Multiple myeloma not having achieved remission (H) 6/24/2019     Other specified disorders of bladder 07/09/2012    Irritable Bladder     Seasonal allergies 10/1/2015     Unspecified essential hypertension 03/19/2007     Unspecified sinusitis (chronic) 09/05/2007        Allergies   Allergen Reactions     Lisinopril Cough     Phenylephrine Hcl Other  "(See Comments)     **Entex  HEADACHE (SEVERE)     Phenylpropanolamine Other (See Comments)     **Entex  HEADACHE (SEVERE)     Pseudoephedrine Tannate Other (See Comments)     **Entex  HEADACHE (SEVERE)     Levofloxacin Rash     **Levaquin     Moxifloxacin Hcl [Avelox] Rash     Current Outpatient Medications   Medication     acyclovir (ZOVIRAX) 400 MG tablet     aspirin (ASA) 81 MG chewable tablet     atorvastatin (LIPITOR) 10 MG tablet     dexamethasone (DECADRON) 4 MG tablet     EPINEPHrine (EPIPEN) 0.3 MG/0.3ML injection     fluticasone (FLONASE) 50 MCG/ACT nasal spray     L-Lysine HCl 500 MG CAPS     losartan (COZAAR) 50 MG tablet     prochlorperazine (COMPAZINE) 10 MG tablet     sertraline (ZOLOFT) 50 MG tablet     No current facility-administered medications for this visit.     ROS- SEE HPI  /72 (BP Location: Right arm, Patient Position: Sitting, Cuff Size: Adult Regular)   Pulse 90   Temp 97.7  F (36.5  C)   Ht 1.575 m (5' 2\")   Wt 72.6 kg (160 lb)   SpO2 98%   BMI 29.26 kg/m      General - The patient is well nourished and well developed, and appears to have good nutritional status.    Head and Face - Normocephalic and atraumatic, with no gross asymmetry noted of the contour of the facial features.  The facial nerve is intact, with strong symmetric movements.  Eyes - Extraocular movements intact, and the pupils were reactive to light.  Sclera were not icteric or injected, conjunctiva were pink and moist.  Mouth - Examination of the oral cavity shows pink, healthy, moist mucosa.  No lesions or ulceration noted.  The dentition are in good repair.  The tongue is mobile and midline.  Ears - Examination of the ears showed- Right canal clear. Right TM is intact without effusion or perforation. Ears examined under otologic microscopy. Canal cleaned with cupped forceps.   Left TM is with perforation, central anterior about 15%. ME appears healthy and dry.   Eyes - Extraocular movements intact, and the " pupils were reactive to light.  Sclera were not icteric or injected, conjunctiva were pink and moist.  Mouth - Examination of the oral cavity showed pink, healthy oral mucosa. No lesions or ulcerations noted.  The tongue was mobile and midline, and the dentition were in good condition.    Throat - The walls of the oropharynx were smooth, pink, moist, symmetric, and had no lesions or ulcerations.  The tonsillar pillars and soft palate were symmetric.  The uvula was midline on elevation.    Neck - Normal midline excursion of the laryngotracheal complex during swallowing.  Full range of motion on passive movement.  Palpation of the occipital, submental, submandibular, internal jugular chain, and supraclavicular nodes did not demonstrate any abnormal lymph nodes or masses.  Palpation of the thyroid was soft and smooth, with no nodules or goiter appreciated.  The trachea was mobile and midline.          ASSESSMENT/ PLAN:      ICD-10-CM    1. Perforation of tympanic membrane, left  H72.92    2. Sensorineural hearing loss (SNHL) of right ear with restricted hearing of left ear  H90.A21    3. Mixed conductive and sensorineural hearing loss of left ear with restricted hearing of right ear  H90.A32    4. H/O myringotomy w/ tube placement  Z98.890        Follow up with Audiology for audiogram and Hearing aid check.   Ears were cleaned. Removed cerumen.  Left Perforation remains   Continue w/ water precautions.   She may continue with observation vs. Surgical repair at this juncture. Discussed surgery and expected outcomes. She will continue with observation.     Recheck ears in 6 months.       Bridgette Ly PA-C  ENT  [facility] [place]          Again, thank you for allowing me to participate in the care of your patient.        Sincerely,        Bridgette Ly PA-C

## 2022-02-14 ENCOUNTER — TELEPHONE (OUTPATIENT)
Dept: INFUSION THERAPY | Facility: OTHER | Age: 72
End: 2022-02-14
Payer: COMMERCIAL

## 2022-02-14 NOTE — TELEPHONE ENCOUNTER
----- Message from Elías Walker MD sent at 2/11/2022  7:36 PM CST -----  Should be fine  ----- Message -----  From: Christine Otoole RN  Sent: 2/11/2022   9:43 AM CST  To: Elías Walker MD    Patient has a dentist appointment in March for a filling. She is due for her Zometa in March. She would like to delay the zometa until April. Are you okay with this?

## 2022-02-15 ENCOUNTER — INFUSION THERAPY VISIT (OUTPATIENT)
Dept: INFUSION THERAPY | Facility: OTHER | Age: 72
End: 2022-02-15
Attending: INTERNAL MEDICINE
Payer: MEDICARE

## 2022-02-15 VITALS
RESPIRATION RATE: 18 BRPM | OXYGEN SATURATION: 98 % | BODY MASS INDEX: 29.69 KG/M2 | TEMPERATURE: 98.3 F | SYSTOLIC BLOOD PRESSURE: 123 MMHG | HEIGHT: 63 IN | DIASTOLIC BLOOD PRESSURE: 70 MMHG | WEIGHT: 167.55 LBS | HEART RATE: 79 BPM

## 2022-02-15 DIAGNOSIS — C90.00 MULTIPLE MYELOMA NOT HAVING ACHIEVED REMISSION (H): Primary | ICD-10-CM

## 2022-02-15 LAB
BASOPHILS # BLD AUTO: 0 10E3/UL (ref 0–0.2)
BASOPHILS NFR BLD AUTO: 0 %
EOSINOPHIL # BLD AUTO: 0.1 10E3/UL (ref 0–0.7)
EOSINOPHIL NFR BLD AUTO: 3 %
ERYTHROCYTE [DISTWIDTH] IN BLOOD BY AUTOMATED COUNT: 14.2 % (ref 10–15)
HCT VFR BLD AUTO: 41.4 % (ref 35–47)
HGB BLD-MCNC: 13.8 G/DL (ref 11.7–15.7)
IMM GRANULOCYTES # BLD: 0.1 10E3/UL
IMM GRANULOCYTES NFR BLD: 2 %
LYMPHOCYTES # BLD AUTO: 0.5 10E3/UL (ref 0.8–5.3)
LYMPHOCYTES NFR BLD AUTO: 14 %
MCH RBC QN AUTO: 31.4 PG (ref 26.5–33)
MCHC RBC AUTO-ENTMCNC: 33.3 G/DL (ref 31.5–36.5)
MCV RBC AUTO: 94 FL (ref 78–100)
MONOCYTES # BLD AUTO: 0.3 10E3/UL (ref 0–1.3)
MONOCYTES NFR BLD AUTO: 7 %
NEUTROPHILS # BLD AUTO: 2.8 10E3/UL (ref 1.6–8.3)
NEUTROPHILS NFR BLD AUTO: 74 %
NRBC # BLD AUTO: 0 10E3/UL
NRBC BLD AUTO-RTO: 0 /100
PLATELET # BLD AUTO: 201 10E3/UL (ref 150–450)
RBC # BLD AUTO: 4.4 10E6/UL (ref 3.8–5.2)
WBC # BLD AUTO: 3.8 10E3/UL (ref 4–11)

## 2022-02-15 PROCEDURE — 85025 COMPLETE CBC W/AUTO DIFF WBC: CPT | Mod: ZL | Performed by: INTERNAL MEDICINE

## 2022-02-15 PROCEDURE — 96401 CHEMO ANTI-NEOPL SQ/IM: CPT

## 2022-02-15 PROCEDURE — 250N000011 HC RX IP 250 OP 636: Performed by: INTERNAL MEDICINE

## 2022-02-15 PROCEDURE — 36415 COLL VENOUS BLD VENIPUNCTURE: CPT | Mod: ZL | Performed by: INTERNAL MEDICINE

## 2022-02-15 RX ADMIN — BORTEZOMIB 2.4 MG: 3.5 INJECTION, POWDER, LYOPHILIZED, FOR SOLUTION INTRAVENOUS; SUBCUTANEOUS at 13:39

## 2022-02-15 ASSESSMENT — MIFFLIN-ST. JEOR: SCORE: 1234

## 2022-02-15 ASSESSMENT — PAIN SCALES - GENERAL: PAINLEVEL: NO PAIN (0)

## 2022-02-15 NOTE — PROGRESS NOTES
Patient is 71 years old, here today for injection of Velcade per order of . Patient meets parameters for today's infusion. Patient identified with two identifiers, order verified, and verbal consent for today's infusion obtained from patient. Written consent for treatment is on file and valid.    Component      Latest Ref Rng & Units 2/15/2022   WBC      4.0 - 11.0 10e3/uL 3.8 (L)   RBC Count      3.80 - 5.20 10e6/uL 4.40   Hemoglobin      11.7 - 15.7 g/dL 13.8   Hematocrit      35.0 - 47.0 % 41.4   MCV      78 - 100 fL 94   MCH      26.5 - 33.0 pg 31.4   MCHC      31.5 - 36.5 g/dL 33.3   RDW      10.0 - 15.0 % 14.2   Platelet Count      150 - 450 10e3/uL 201   % Neutrophils      % 74   % Lymphocytes      % 14   % Monocytes      % 7   % Eosinophils      % 3   % Basophils      % 0   % Immature Granulocytes      % 2   NRBCs per 100 WBC      <1 /100 0   Absolute Neutrophils      1.6 - 8.3 10e3/uL 2.8   Absolute Lymphocytes      0.8 - 5.3 10e3/uL 0.5 (L)   Absolute Monocytes      0.0 - 1.3 10e3/uL 0.3   Absolute Eosinophils      0.0 - 0.7 10e3/uL 0.1   Absolute Basophils      0.0 - 0.2 10e3/uL 0.0   Absolute Immature Granulocytes      <=0.4 10e3/uL 0.1   Absolute NRBCs      10e3/uL 0.0       Patient meets order parameters for today's treatment.    Patient denies questions or concerns regarding injection and/or medication(s) being administered.    Independent dose check completed with UZMA Rodríguez.    Velcade injected SQ into left upper outer arm at a 45 degree angle per protocol rotating sites. Patient tolerated injection without incident, no signs or symptoms of adverse reaction noted. Patient denies pain or discomfort.     Covered with a sterile bandage. Pt instructed to leave bandage intact for a minimum of one hour, and to call with questions or concerns.  Patient states understanding, discharged.

## 2022-02-15 NOTE — PROGRESS NOTES
"  Assessment & Plan     Major depressive disorder, recurrent episode, mild (H)  She is doing well. Keeping pos  - sertraline (ZOLOFT) 50 MG tablet; Take 1 tablet (50 mg) by mouth daily    Essential hypertension with goal blood pressure less than 140/90  She is going to e given a refill. Good pressure today.   - losartan (COZAAR) 50 MG tablet; Take 1 tablet (50 mg) by mouth daily    Mixed hyperlipidemia  She is givena refill and her numbers are in range   - atorvastatin (LIPITOR) 10 MG tablet; Take 1 tablet (10 mg) by mouth daily    Multiple myeloma not having achieved remission (H)  Not in remission .  She is active with hematology working on her medications.  No table spine lesions from the start.   - acyclovir (ZOVIRAX) 400 MG tablet; Take 1 tablet (400 mg) by mouth 2 times daily    Type 2 diabetes mellitus with hyperosmolarity without coma, without long-term current use of insulin (H)  She is needing a check numbers are up.    - Hemoglobin A1c; Future    Review of external notes as documented elsewhere in note  Ordering of each unique test  Prescription drug management  5 minutes spent on the date of the encounter doing chart review, patient visit and documentation        BMI:   Estimated body mass index is 29.59 kg/m  as calculated from the following:    Height as of this encounter: 1.6 m (5' 2.99\").    Weight as of this encounter: 75.8 kg (167 lb).   Weight management plan: Discussed healthy diet and exercise guidelines    See Patient Instructions    No follow-ups on file.    VENKATESH Trevino  [facility] - KERRI Mae is a 71 year old who presents for the following health issues     HPI     Hypertension Follow-up      Do you check your blood pressure regularly outside of the clinic? No     Are you following a low salt diet? No    Are your blood pressures ever more than 140 on the top number (systolic) OR more   than 90 on the bottom number (diastolic), for example 140/90? " "No      Concern - elevated glucose noted onher chemo labs. Not steriod induced   Onset: last few months.   Description: unaware of too high.   Intensity: mild  Progression of Symptoms:  same  Accompanying Signs & Symptoms: none. Is getting more activity and did join Chemo rehab.   Previous history of similar problem: na   Precipitating factors:        Worsened by: carbs   Alleviating factors:        Improved by: exercise.   Therapies tried and outcome:  none       Review of Systems   CONSTITUTIONAL: NEGATIVE for fever, chills, change in weight  INTEGUMENTARY/SKIN: no bruising andno new rashes   ENT/MOUTH: NEGATIVE for ear, mouth and throat problems  RESP: NEGATIVE for significant cough or SOB  CV: NEGATIVE for chest pain, palpitations or peripheral edema  MUSCULOSKELETAL: right hip pain and better with her chemo rehab.   PSYCHIATRIC: feels better after cming into the clinic.       Objective    /74 (BP Location: Left arm, Patient Position: Sitting, Cuff Size: Adult Regular)   Pulse 73   Temp 98.5  F (36.9  C) (Tympanic)   Ht 1.6 m (5' 2.99\")   Wt 75.8 kg (167 lb)   SpO2 97%   BMI 29.59 kg/m    Body mass index is 29.59 kg/m .  Physical Exam   GENERAL: healthy, alert and no distress  EYES: Eyes grossly normal to inspection, PERRL and conjunctivae and sclerae normal  HENT: ear canals and TM's normal, nose and mouth without ulcers or lesions  NECK: no adenopathy, no asymmetry, masses, or scars and thyroid normal to palpation  RESP: lungs clear to auscultation - no rales, rhonchi or wheezes  CV: regular rate and rhythm, normal S1 S2, no S3 or S4, no murmur, click or rub, no peripheral edema and peripheral pulses strong  ABDOMEN: soft, nontender, no hepatosplenomegaly, no masses and bowel sounds normal  SKIN: no suspicious lesions or rashes  PSYCH: mentation appears normal, affect normal/bright  LYMPH: no cervical, supraclavicular, axillary, or inguinal adenopathy    Infusion Therapy Visit on 02/15/2022 "   Component Date Value Ref Range Status     WBC Count 02/15/2022 3.8 (A) 4.0 - 11.0 10e3/uL Final     RBC Count 02/15/2022 4.40  3.80 - 5.20 10e6/uL Final     Hemoglobin 02/15/2022 13.8  11.7 - 15.7 g/dL Final     Hematocrit 02/15/2022 41.4  35.0 - 47.0 % Final     MCV 02/15/2022 94  78 - 100 fL Final     MCH 02/15/2022 31.4  26.5 - 33.0 pg Final     MCHC 02/15/2022 33.3  31.5 - 36.5 g/dL Final     RDW 02/15/2022 14.2  10.0 - 15.0 % Final     Platelet Count 02/15/2022 201  150 - 450 10e3/uL Final     % Neutrophils 02/15/2022 74  % Final     % Lymphocytes 02/15/2022 14  % Final     % Monocytes 02/15/2022 7  % Final     % Eosinophils 02/15/2022 3  % Final     % Basophils 02/15/2022 0  % Final     % Immature Granulocytes 02/15/2022 2  % Final     NRBCs per 100 WBC 02/15/2022 0  <1 /100 Final     Absolute Neutrophils 02/15/2022 2.8  1.6 - 8.3 10e3/uL Final     Absolute Lymphocytes 02/15/2022 0.5 (A) 0.8 - 5.3 10e3/uL Final     Absolute Monocytes 02/15/2022 0.3  0.0 - 1.3 10e3/uL Final     Absolute Eosinophils 02/15/2022 0.1  0.0 - 0.7 10e3/uL Final     Absolute Basophils 02/15/2022 0.0  0.0 - 0.2 10e3/uL Final     Absolute Immature Granulocytes 02/15/2022 0.1  <=0.4 10e3/uL Final     Absolute NRBCs 02/15/2022 0.0  10e3/uL Final       Last Comprehensive Metabolic Panel:  Sodium   Date Value Ref Range Status   02/08/2022 137 133 - 144 mmol/L Final   07/06/2021 138 133 - 144 mmol/L Final     Potassium   Date Value Ref Range Status   02/08/2022 3.7 3.4 - 5.3 mmol/L Final   07/06/2021 4.2 3.4 - 5.3 mmol/L Final     Chloride   Date Value Ref Range Status   02/08/2022 104 94 - 109 mmol/L Final   07/06/2021 106 94 - 109 mmol/L Final     Carbon Dioxide   Date Value Ref Range Status   07/06/2021 28 20 - 32 mmol/L Final     Carbon Dioxide (CO2)   Date Value Ref Range Status   02/08/2022 28 20 - 32 mmol/L Final     Anion Gap   Date Value Ref Range Status   02/08/2022 5 3 - 14 mmol/L Final   07/06/2021 4 3 - 14 mmol/L Final      Glucose   Date Value Ref Range Status   02/08/2022 163 (H) 70 - 99 mg/dL Final   07/06/2021 96 70 - 99 mg/dL Final     Urea Nitrogen   Date Value Ref Range Status   02/08/2022 20 7 - 30 mg/dL Final   07/06/2021 18 7 - 30 mg/dL Final     Creatinine   Date Value Ref Range Status   02/08/2022 0.79 0.52 - 1.04 mg/dL Final   07/06/2021 0.76 0.52 - 1.04 mg/dL Final     GFR Estimate   Date Value Ref Range Status   02/08/2022 79 >60 mL/min/1.73m2 Final     Comment:     Effective December 21, 2021 eGFRcr in adults is calculated using the 2021 CKD-EPI creatinine equation which includes age and gender (Farheen et al., [place], DOI: 10.1056/RZIAma3145821)   07/06/2021 78 >60 mL/min/[1.73_m2] Final     Comment:     Non  GFR Calc  Starting 12/18/2018, serum creatinine based estimated GFR (eGFR) will be   calculated using the Chronic Kidney Disease Epidemiology Collaboration   (CKD-EPI) equation.       Calcium   Date Value Ref Range Status   02/08/2022 9.3 8.5 - 10.1 mg/dL Final   07/06/2021 8.6 8.5 - 10.1 mg/dL Final     Bilirubin Total   Date Value Ref Range Status   02/08/2022 0.3 0.2 - 1.3 mg/dL Final   07/06/2021 0.3 0.2 - 1.3 mg/dL Final     Alkaline Phosphatase   Date Value Ref Range Status   02/08/2022 121 40 - 150 U/L Final   07/06/2021 126 40 - 150 U/L Final     ALT   Date Value Ref Range Status   02/08/2022 17 0 - 50 U/L Final   07/06/2021 17 0 - 50 U/L Final     AST   Date Value Ref Range Status   02/08/2022 12 0 - 45 U/L Final   07/06/2021 15 0 - 45 U/L Final         Recent Labs   Lab Test 08/17/21  1233 04/27/21  1228 10/26/16  0921 10/01/15  1015 08/22/14  1155   CHOL 265* 220*   < > 193 180   HDL 46* 49*   < > 56 57   * 137*   < > 115 106   TRIG 266* 171*   < > 110 85   CHOLHDLRATIO  --   --   --  3.4 3.2    < > = values in this interval not displayed.           We are getting an A1C on her next lab draw.

## 2022-02-16 ENCOUNTER — HOSPITAL ENCOUNTER (OUTPATIENT)
Dept: PHYSICAL THERAPY | Facility: HOSPITAL | Age: 72
Setting detail: THERAPIES SERIES
End: 2022-02-16
Attending: PHYSICIAN ASSISTANT
Payer: MEDICARE

## 2022-02-16 PROCEDURE — 97110 THERAPEUTIC EXERCISES: CPT | Mod: GP

## 2022-02-17 ENCOUNTER — OFFICE VISIT (OUTPATIENT)
Dept: FAMILY MEDICINE | Facility: OTHER | Age: 72
End: 2022-02-17
Attending: PHYSICIAN ASSISTANT
Payer: COMMERCIAL

## 2022-02-17 VITALS
BODY MASS INDEX: 29.59 KG/M2 | TEMPERATURE: 98.5 F | WEIGHT: 167 LBS | SYSTOLIC BLOOD PRESSURE: 120 MMHG | OXYGEN SATURATION: 97 % | HEIGHT: 63 IN | HEART RATE: 73 BPM | DIASTOLIC BLOOD PRESSURE: 74 MMHG

## 2022-02-17 DIAGNOSIS — C90.00 MULTIPLE MYELOMA NOT HAVING ACHIEVED REMISSION (H): ICD-10-CM

## 2022-02-17 DIAGNOSIS — E78.2 MIXED HYPERLIPIDEMIA: ICD-10-CM

## 2022-02-17 DIAGNOSIS — F33.0 MAJOR DEPRESSIVE DISORDER, RECURRENT EPISODE, MILD (H): ICD-10-CM

## 2022-02-17 DIAGNOSIS — I10 ESSENTIAL HYPERTENSION WITH GOAL BLOOD PRESSURE LESS THAN 140/90: ICD-10-CM

## 2022-02-17 DIAGNOSIS — E11.00 TYPE 2 DIABETES MELLITUS WITH HYPEROSMOLARITY WITHOUT COMA, WITHOUT LONG-TERM CURRENT USE OF INSULIN (H): Primary | ICD-10-CM

## 2022-02-17 PROCEDURE — 99214 OFFICE O/P EST MOD 30 MIN: CPT | Performed by: PHYSICIAN ASSISTANT

## 2022-02-17 PROCEDURE — G0463 HOSPITAL OUTPT CLINIC VISIT: HCPCS

## 2022-02-17 RX ORDER — ACYCLOVIR 400 MG/1
400 TABLET ORAL 2 TIMES DAILY
Qty: 180 TABLET | Refills: 3 | Status: SHIPPED | OUTPATIENT
Start: 2022-02-17 | End: 2022-11-16

## 2022-02-17 RX ORDER — LOSARTAN POTASSIUM 50 MG/1
50 TABLET ORAL DAILY
Qty: 90 TABLET | Refills: 3 | Status: SHIPPED | OUTPATIENT
Start: 2022-02-17 | End: 2022-11-16

## 2022-02-17 RX ORDER — ATORVASTATIN CALCIUM 10 MG/1
10 TABLET, FILM COATED ORAL DAILY
Qty: 90 TABLET | Refills: 3 | Status: SHIPPED | OUTPATIENT
Start: 2022-02-17 | End: 2022-11-16

## 2022-02-17 ASSESSMENT — ANXIETY QUESTIONNAIRES
6. BECOMING EASILY ANNOYED OR IRRITABLE: NOT AT ALL
4. TROUBLE RELAXING: NOT AT ALL
2. NOT BEING ABLE TO STOP OR CONTROL WORRYING: NOT AT ALL
1. FEELING NERVOUS, ANXIOUS, OR ON EDGE: NOT AT ALL
GAD7 TOTAL SCORE: 0
5. BEING SO RESTLESS THAT IT IS HARD TO SIT STILL: NOT AT ALL
3. WORRYING TOO MUCH ABOUT DIFFERENT THINGS: NOT AT ALL
7. FEELING AFRAID AS IF SOMETHING AWFUL MIGHT HAPPEN: NOT AT ALL

## 2022-02-17 ASSESSMENT — PATIENT HEALTH QUESTIONNAIRE - PHQ9: SUM OF ALL RESPONSES TO PHQ QUESTIONS 1-9: 0

## 2022-02-17 ASSESSMENT — PAIN SCALES - GENERAL: PAINLEVEL: NO PAIN (0)

## 2022-02-17 NOTE — PATIENT INSTRUCTIONS
Thank you for choosing [facility].   I have office hours 8:00 am to 4:30 pm on Monday's, Wednesday's, Thursday's and Friday's. My nurse and I are out of the office every Tuesday.    Following your visit, when your labs and diagnostic testing have returned, I will review then and you will be contacted by my nurse.  If you are on My Chart, you can also view results there.    For refills, notify your pharmacy regarding what you need and the pharmacy will generate a refill request. Do not call my nurse as she is unable to process refill request. Please plan ahead and allow 3-5 days for refill requests.    You will generally receive a reminder call the day prior to your appointment.  If you cannot attend your appointment, please cancel your appointment with as much notice as possible.  If there is a pattern of failure to present for your appointments, I cannot provide consistent, meaningful, ongoing care for you. It is very important to me that you come in for your care, so we can best assist you with your health care needs.    IMPORTANT:  Please note that it is my standard of practice to NOT participate in prescribing ongoing requested Narcotic Analgesic therapy, and/or participate in the prescribing of other controlled substances.  My nurse and I am happy to assist you with the process of referral for alternative pain management as needed, and other treatment modalities including but not limited to:  Physical Therapy, Physical Medicine and Rehab, Counseling, Chiropractic Care, Orthopedic Care, and non-narcotic medication management.     In the event that you need to be seen for emergent concerns and I am out of office,  please see one of my colleagues for acute concerns.  You may also present to  or ER.  I appreciate the opportunity to serve you and look forward to supporting your healthcare needs in the future. Please contact me with any questions or concerns that you may  have.    Sincerely,      Kenyatta Trujillo RN, PA-C

## 2022-02-17 NOTE — NURSING NOTE
"Chief Complaint   Patient presents with     Hypertension       Initial /74 (BP Location: Left arm, Patient Position: Sitting, Cuff Size: Adult Regular)   Pulse 73   Temp 98.5  F (36.9  C) (Tympanic)   Ht 1.6 m (5' 2.99\")   Wt 75.8 kg (167 lb)   SpO2 97%   BMI 29.59 kg/m   Estimated body mass index is 29.59 kg/m  as calculated from the following:    Height as of this encounter: 1.6 m (5' 2.99\").    Weight as of this encounter: 75.8 kg (167 lb).  Medication Reconciliation: complete  Marlyn Loo LPN  "

## 2022-02-18 ENCOUNTER — INFUSION THERAPY VISIT (OUTPATIENT)
Dept: INFUSION THERAPY | Facility: OTHER | Age: 72
End: 2022-02-18
Attending: INTERNAL MEDICINE
Payer: MEDICARE

## 2022-02-18 VITALS
TEMPERATURE: 98.3 F | BODY MASS INDEX: 29.57 KG/M2 | SYSTOLIC BLOOD PRESSURE: 117 MMHG | OXYGEN SATURATION: 98 % | HEIGHT: 63 IN | WEIGHT: 166.89 LBS | DIASTOLIC BLOOD PRESSURE: 82 MMHG | HEART RATE: 72 BPM | RESPIRATION RATE: 18 BRPM

## 2022-02-18 DIAGNOSIS — C90.00 MULTIPLE MYELOMA NOT HAVING ACHIEVED REMISSION (H): Primary | ICD-10-CM

## 2022-02-18 LAB
BASOPHILS # BLD AUTO: 0 10E3/UL (ref 0–0.2)
BASOPHILS NFR BLD AUTO: 1 %
EOSINOPHIL # BLD AUTO: 0.1 10E3/UL (ref 0–0.7)
EOSINOPHIL NFR BLD AUTO: 3 %
ERYTHROCYTE [DISTWIDTH] IN BLOOD BY AUTOMATED COUNT: 14.3 % (ref 10–15)
HCT VFR BLD AUTO: 41.1 % (ref 35–47)
HGB BLD-MCNC: 14.1 G/DL (ref 11.7–15.7)
IMM GRANULOCYTES # BLD: 0.1 10E3/UL
IMM GRANULOCYTES NFR BLD: 2 %
LYMPHOCYTES # BLD AUTO: 1.2 10E3/UL (ref 0.8–5.3)
LYMPHOCYTES NFR BLD AUTO: 23 %
MCH RBC QN AUTO: 31.3 PG (ref 26.5–33)
MCHC RBC AUTO-ENTMCNC: 34.3 G/DL (ref 31.5–36.5)
MCV RBC AUTO: 91 FL (ref 78–100)
MONOCYTES # BLD AUTO: 0.9 10E3/UL (ref 0–1.3)
MONOCYTES NFR BLD AUTO: 16 %
NEUTROPHILS # BLD AUTO: 2.9 10E3/UL (ref 1.6–8.3)
NEUTROPHILS NFR BLD AUTO: 55 %
NRBC # BLD AUTO: 0 10E3/UL
NRBC BLD AUTO-RTO: 0 /100
PLATELET # BLD AUTO: 181 10E3/UL (ref 150–450)
RBC # BLD AUTO: 4.51 10E6/UL (ref 3.8–5.2)
WBC # BLD AUTO: 5.2 10E3/UL (ref 4–11)

## 2022-02-18 PROCEDURE — 250N000011 HC RX IP 250 OP 636: Mod: JW | Performed by: INTERNAL MEDICINE

## 2022-02-18 PROCEDURE — 85025 COMPLETE CBC W/AUTO DIFF WBC: CPT | Mod: ZL

## 2022-02-18 PROCEDURE — 36415 COLL VENOUS BLD VENIPUNCTURE: CPT | Mod: ZL

## 2022-02-18 PROCEDURE — 96401 CHEMO ANTI-NEOPL SQ/IM: CPT

## 2022-02-18 RX ADMIN — BORTEZOMIB 2.4 MG: 3.5 INJECTION, POWDER, LYOPHILIZED, FOR SOLUTION INTRAVENOUS; SUBCUTANEOUS at 13:05

## 2022-02-18 ASSESSMENT — ANXIETY QUESTIONNAIRES: GAD7 TOTAL SCORE: 0

## 2022-02-18 NOTE — PROGRESS NOTES
Patient is a 71 year old here today for injection of Velcade per order of . Patient meets parameters for today's infusion. Patient identified with two identifiers, order verified, and verbal consent for today's infusion obtained from patient.    Component      Latest Ref Rng & Units 2/18/2022   WBC      4.0 - 11.0 10e3/uL 5.2   RBC Count      3.80 - 5.20 10e6/uL 4.51   Hemoglobin      11.7 - 15.7 g/dL 14.1   Hematocrit      35.0 - 47.0 % 41.1   MCV      78 - 100 fL 91   MCH      26.5 - 33.0 pg 31.3   MCHC      31.5 - 36.5 g/dL 34.3   RDW      10.0 - 15.0 % 14.3   Platelet Count      150 - 450 10e3/uL 181   % Neutrophils      % 55   % Lymphocytes      % 23   % Monocytes      % 16   % Eosinophils      % 3   % Basophils      % 1   % Immature Granulocytes      % 2   NRBCs per 100 WBC      <1 /100 0   Absolute Neutrophils      1.6 - 8.3 10e3/uL 2.9   Absolute Lymphocytes      0.8 - 5.3 10e3/uL 1.2   Absolute Monocytes      0.0 - 1.3 10e3/uL 0.9   Absolute Eosinophils      0.0 - 0.7 10e3/uL 0.1   Absolute Basophils      0.0 - 0.2 10e3/uL 0.0   Absolute Immature Granulocytes      <=0.4 10e3/uL 0.1   Absolute NRBCs      10e3/uL 0.0       Patient meets order parameters for today's treatment.    Patient denies questions or concerns regarding injection and/or medication(s) being administered.    Velcade injected SQ into right upper outer arm per protocol rotating sites.     Injection administered per protocol. Patient tolerated infusion without incident, no signs or symptoms of adverse reaction noted. Patient denies pain or discomfort.     Covered with a sterile bandage. Pt instructed to leave bandage intact for a minimum of one hour, and to call with questions or concerns. Patient states understanding, discharged ambulatory.

## 2022-02-18 NOTE — PATIENT INSTRUCTIONS

## 2022-02-22 ENCOUNTER — LAB (OUTPATIENT)
Dept: LAB | Facility: OTHER | Age: 72
End: 2022-02-22
Payer: MEDICARE

## 2022-02-22 DIAGNOSIS — E11.00 TYPE 2 DIABETES MELLITUS WITH HYPEROSMOLARITY WITHOUT COMA, WITHOUT LONG-TERM CURRENT USE OF INSULIN (H): ICD-10-CM

## 2022-02-22 LAB
EST. AVERAGE GLUCOSE BLD GHB EST-MCNC: 134 MG/DL
HBA1C MFR BLD: 6.3 % (ref 0–5.6)

## 2022-02-22 PROCEDURE — 83036 HEMOGLOBIN GLYCOSYLATED A1C: CPT | Mod: ZL

## 2022-02-22 PROCEDURE — 36415 COLL VENOUS BLD VENIPUNCTURE: CPT | Mod: ZL

## 2022-02-23 ENCOUNTER — HOSPITAL ENCOUNTER (OUTPATIENT)
Dept: PHYSICAL THERAPY | Facility: HOSPITAL | Age: 72
Setting detail: THERAPIES SERIES
End: 2022-02-23
Attending: PHYSICIAN ASSISTANT
Payer: MEDICARE

## 2022-02-23 PROCEDURE — 97110 THERAPEUTIC EXERCISES: CPT | Mod: GP

## 2022-02-23 NOTE — PROGRESS NOTES
[facility]    Outpatient Physical Therapy Discharge Note    Patient: Carmelita Watts  : 1950    Beginning/End Dates of Reporting Period:  12/15/2021 to 2022    Referring Provider: Barbie Simmons NP    Therapy Diagnosis: Multiple Myeloma Not Having Achieved Remission     Client Self Report: Carmelita arrived to therapy today stating she is doing well. She states she would like to join a gym program to continue her progress after therapy. She states she has been very happy with therapy and is ready to try on her own. She was encouraged to try a WEL program designed at the hospital - she was agreeable. She will need an MD referral. The PT will reach out to her primary provider to see if this would be appropriate. She states she has no pain today.     Goals:  Goal Identifier STG - 1    Goal Description Patient will be able to complete 10 RDLs B without LOB to place her in the low fall risk category.    Target Date 22   Date Met  22   Progress (detail required for progress note): Goal Met      Goal Identifier LTG - 1    Goal Description Patient will be indep with a HEP to allow her to progress her strengthening and stability independelntly at home.    Target Date 02/15/22   Date Met  22   Progress (detail required for progress note): Goal Met      Assessment: Patient has tolerated therapy well and made very good progress. She is appropriate for [facility]. She has a progressive HEP to continue her therapy. She also has access to a wellness program if she chooses to participate. She is agreeable with this plan.    Home Program: HEP created today with a handout provided on how to progress moving forward.    Plan:   Discharge from therapy. Patient plans to converse with her oncology providers if she needs any further assistance     Discharge: Patient is  appropriate for discharge at this time. She notes she has no pain post-treatment and has returned to activities such as snow-blowing, painting, etc.     Reason for Discharge: Patient has met all goals.    Equipment Issued: None     Discharge Plan: Patient to continue home program and join a wellness program.     I certify the need for these services furnished under this plan of treatment and while under my care. (Physician co-signature of this document indicates review and certification of the therapy plan).

## 2022-02-24 ENCOUNTER — TELEPHONE (OUTPATIENT)
Dept: ONCOLOGY | Facility: OTHER | Age: 72
End: 2022-02-24
Payer: COMMERCIAL

## 2022-02-24 DIAGNOSIS — C90.00 MULTIPLE MYELOMA NOT HAVING ACHIEVED REMISSION (H): Primary | ICD-10-CM

## 2022-02-24 RX ORDER — MEPERIDINE HYDROCHLORIDE 50 MG/ML
25 INJECTION INTRAMUSCULAR; INTRAVENOUS; SUBCUTANEOUS EVERY 30 MIN PRN
Status: CANCELLED | OUTPATIENT
Start: 2022-03-01

## 2022-02-24 RX ORDER — ALBUTEROL SULFATE 90 UG/1
1-2 AEROSOL, METERED RESPIRATORY (INHALATION)
Status: CANCELLED
Start: 2022-03-11

## 2022-02-24 RX ORDER — METHYLPREDNISOLONE SODIUM SUCCINATE 125 MG/2ML
125 INJECTION, POWDER, LYOPHILIZED, FOR SOLUTION INTRAMUSCULAR; INTRAVENOUS
Status: CANCELLED
Start: 2022-03-01

## 2022-02-24 RX ORDER — SODIUM CHLORIDE 9 MG/ML
1000 INJECTION, SOLUTION INTRAVENOUS CONTINUOUS PRN
Status: CANCELLED
Start: 2022-03-08

## 2022-02-24 RX ORDER — NALOXONE HYDROCHLORIDE 0.4 MG/ML
.1-.4 INJECTION, SOLUTION INTRAMUSCULAR; INTRAVENOUS; SUBCUTANEOUS
Status: CANCELLED | OUTPATIENT
Start: 2022-03-11

## 2022-02-24 RX ORDER — NALOXONE HYDROCHLORIDE 0.4 MG/ML
.1-.4 INJECTION, SOLUTION INTRAMUSCULAR; INTRAVENOUS; SUBCUTANEOUS
Status: CANCELLED | OUTPATIENT
Start: 2022-03-08

## 2022-02-24 RX ORDER — DIPHENHYDRAMINE HYDROCHLORIDE 50 MG/ML
50 INJECTION INTRAMUSCULAR; INTRAVENOUS
Status: CANCELLED
Start: 2022-03-03

## 2022-02-24 RX ORDER — HEPARIN SODIUM (PORCINE) LOCK FLUSH IV SOLN 100 UNIT/ML 100 UNIT/ML
5 SOLUTION INTRAVENOUS
Status: CANCELLED | OUTPATIENT
Start: 2022-03-01

## 2022-02-24 RX ORDER — ALBUTEROL SULFATE 90 UG/1
1-2 AEROSOL, METERED RESPIRATORY (INHALATION)
Status: CANCELLED
Start: 2022-03-03

## 2022-02-24 RX ORDER — ACETAMINOPHEN 325 MG/1
650 TABLET ORAL ONCE
Status: CANCELLED | OUTPATIENT
Start: 2022-03-01

## 2022-02-24 RX ORDER — ALBUTEROL SULFATE 90 UG/1
1-2 AEROSOL, METERED RESPIRATORY (INHALATION)
Status: CANCELLED
Start: 2022-03-01

## 2022-02-24 RX ORDER — ALBUTEROL SULFATE 0.83 MG/ML
2.5 SOLUTION RESPIRATORY (INHALATION)
Status: CANCELLED | OUTPATIENT
Start: 2022-03-01

## 2022-02-24 RX ORDER — DIPHENHYDRAMINE HYDROCHLORIDE 50 MG/ML
50 INJECTION INTRAMUSCULAR; INTRAVENOUS
Status: CANCELLED
Start: 2022-03-01

## 2022-02-24 RX ORDER — MEPERIDINE HYDROCHLORIDE 50 MG/ML
25 INJECTION INTRAMUSCULAR; INTRAVENOUS; SUBCUTANEOUS EVERY 30 MIN PRN
Status: CANCELLED | OUTPATIENT
Start: 2022-03-08

## 2022-02-24 RX ORDER — DIPHENHYDRAMINE HYDROCHLORIDE 50 MG/ML
50 INJECTION INTRAMUSCULAR; INTRAVENOUS
Status: CANCELLED
Start: 2022-03-08

## 2022-02-24 RX ORDER — HEPARIN SODIUM (PORCINE) LOCK FLUSH IV SOLN 100 UNIT/ML 100 UNIT/ML
5 SOLUTION INTRAVENOUS
Status: CANCELLED | OUTPATIENT
Start: 2022-03-03

## 2022-02-24 RX ORDER — LORAZEPAM 2 MG/ML
0.5 INJECTION INTRAMUSCULAR EVERY 4 HOURS PRN
Status: CANCELLED | OUTPATIENT
Start: 2022-03-11

## 2022-02-24 RX ORDER — SODIUM CHLORIDE 9 MG/ML
1000 INJECTION, SOLUTION INTRAVENOUS CONTINUOUS PRN
Status: CANCELLED
Start: 2022-03-11

## 2022-02-24 RX ORDER — LORAZEPAM 2 MG/ML
0.5 INJECTION INTRAMUSCULAR EVERY 4 HOURS PRN
Status: CANCELLED | OUTPATIENT
Start: 2022-03-03

## 2022-02-24 RX ORDER — ALBUTEROL SULFATE 0.83 MG/ML
2.5 SOLUTION RESPIRATORY (INHALATION)
Status: CANCELLED | OUTPATIENT
Start: 2022-03-03

## 2022-02-24 RX ORDER — LORAZEPAM 2 MG/ML
0.5 INJECTION INTRAMUSCULAR EVERY 4 HOURS PRN
Status: CANCELLED | OUTPATIENT
Start: 2022-03-08

## 2022-02-24 RX ORDER — HEPARIN SODIUM,PORCINE 10 UNIT/ML
5 VIAL (ML) INTRAVENOUS
Status: CANCELLED | OUTPATIENT
Start: 2022-03-01

## 2022-02-24 RX ORDER — MEPERIDINE HYDROCHLORIDE 50 MG/ML
25 INJECTION INTRAMUSCULAR; INTRAVENOUS; SUBCUTANEOUS EVERY 30 MIN PRN
Status: CANCELLED | OUTPATIENT
Start: 2022-03-11

## 2022-02-24 RX ORDER — ALBUTEROL SULFATE 0.83 MG/ML
2.5 SOLUTION RESPIRATORY (INHALATION)
Status: CANCELLED | OUTPATIENT
Start: 2022-03-08

## 2022-02-24 RX ORDER — METHYLPREDNISOLONE SODIUM SUCCINATE 125 MG/2ML
125 INJECTION, POWDER, LYOPHILIZED, FOR SOLUTION INTRAMUSCULAR; INTRAVENOUS
Status: CANCELLED
Start: 2022-03-08

## 2022-02-24 RX ORDER — NALOXONE HYDROCHLORIDE 0.4 MG/ML
.1-.4 INJECTION, SOLUTION INTRAMUSCULAR; INTRAVENOUS; SUBCUTANEOUS
Status: CANCELLED | OUTPATIENT
Start: 2022-03-01

## 2022-02-24 RX ORDER — SODIUM CHLORIDE 9 MG/ML
1000 INJECTION, SOLUTION INTRAVENOUS CONTINUOUS PRN
Status: CANCELLED
Start: 2022-03-03

## 2022-02-24 RX ORDER — MEPERIDINE HYDROCHLORIDE 50 MG/ML
25 INJECTION INTRAMUSCULAR; INTRAVENOUS; SUBCUTANEOUS EVERY 30 MIN PRN
Status: CANCELLED | OUTPATIENT
Start: 2022-03-03

## 2022-02-24 RX ORDER — LORAZEPAM 2 MG/ML
0.5 INJECTION INTRAMUSCULAR EVERY 4 HOURS PRN
Status: CANCELLED | OUTPATIENT
Start: 2022-03-01

## 2022-02-24 RX ORDER — HEPARIN SODIUM,PORCINE 10 UNIT/ML
5 VIAL (ML) INTRAVENOUS
Status: CANCELLED | OUTPATIENT
Start: 2022-03-03

## 2022-02-24 RX ORDER — METHYLPREDNISOLONE SODIUM SUCCINATE 125 MG/2ML
125 INJECTION, POWDER, LYOPHILIZED, FOR SOLUTION INTRAMUSCULAR; INTRAVENOUS
Status: CANCELLED
Start: 2022-03-11

## 2022-02-24 RX ORDER — ALBUTEROL SULFATE 90 UG/1
1-2 AEROSOL, METERED RESPIRATORY (INHALATION)
Status: CANCELLED
Start: 2022-03-08

## 2022-02-24 RX ORDER — ALBUTEROL SULFATE 0.83 MG/ML
2.5 SOLUTION RESPIRATORY (INHALATION)
Status: CANCELLED | OUTPATIENT
Start: 2022-03-11

## 2022-02-24 RX ORDER — EPINEPHRINE 1 MG/ML
0.3 INJECTION, SOLUTION, CONCENTRATE INTRAVENOUS EVERY 5 MIN PRN
Status: CANCELLED | OUTPATIENT
Start: 2022-03-08

## 2022-02-24 RX ORDER — EPINEPHRINE 1 MG/ML
0.3 INJECTION, SOLUTION, CONCENTRATE INTRAVENOUS EVERY 5 MIN PRN
Status: CANCELLED | OUTPATIENT
Start: 2022-03-03

## 2022-02-24 RX ORDER — NALOXONE HYDROCHLORIDE 0.4 MG/ML
.1-.4 INJECTION, SOLUTION INTRAMUSCULAR; INTRAVENOUS; SUBCUTANEOUS
Status: CANCELLED | OUTPATIENT
Start: 2022-03-03

## 2022-02-24 RX ORDER — SODIUM CHLORIDE 9 MG/ML
1000 INJECTION, SOLUTION INTRAVENOUS CONTINUOUS PRN
Status: CANCELLED
Start: 2022-03-01

## 2022-02-24 RX ORDER — HEPARIN SODIUM,PORCINE 10 UNIT/ML
5 VIAL (ML) INTRAVENOUS
Status: CANCELLED | OUTPATIENT
Start: 2022-03-08

## 2022-02-24 RX ORDER — METHYLPREDNISOLONE SODIUM SUCCINATE 125 MG/2ML
125 INJECTION, POWDER, LYOPHILIZED, FOR SOLUTION INTRAMUSCULAR; INTRAVENOUS
Status: CANCELLED
Start: 2022-03-03

## 2022-02-24 RX ORDER — EPINEPHRINE 1 MG/ML
0.3 INJECTION, SOLUTION, CONCENTRATE INTRAVENOUS EVERY 5 MIN PRN
Status: CANCELLED | OUTPATIENT
Start: 2022-03-01

## 2022-02-24 RX ORDER — HEPARIN SODIUM,PORCINE 10 UNIT/ML
5 VIAL (ML) INTRAVENOUS
Status: CANCELLED | OUTPATIENT
Start: 2022-03-11

## 2022-02-24 RX ORDER — DIPHENHYDRAMINE HCL 25 MG
50 CAPSULE ORAL ONCE
Status: CANCELLED | OUTPATIENT
Start: 2022-03-01

## 2022-02-24 RX ORDER — DIPHENHYDRAMINE HYDROCHLORIDE 50 MG/ML
50 INJECTION INTRAMUSCULAR; INTRAVENOUS
Status: CANCELLED
Start: 2022-03-11

## 2022-02-24 RX ORDER — HEPARIN SODIUM (PORCINE) LOCK FLUSH IV SOLN 100 UNIT/ML 100 UNIT/ML
5 SOLUTION INTRAVENOUS
Status: CANCELLED | OUTPATIENT
Start: 2022-03-08

## 2022-02-24 RX ORDER — EPINEPHRINE 1 MG/ML
0.3 INJECTION, SOLUTION, CONCENTRATE INTRAVENOUS EVERY 5 MIN PRN
Status: CANCELLED | OUTPATIENT
Start: 2022-03-11

## 2022-02-24 RX ORDER — HEPARIN SODIUM (PORCINE) LOCK FLUSH IV SOLN 100 UNIT/ML 100 UNIT/ML
5 SOLUTION INTRAVENOUS
Status: CANCELLED | OUTPATIENT
Start: 2022-03-11

## 2022-03-01 ENCOUNTER — APPOINTMENT (OUTPATIENT)
Dept: LAB | Facility: OTHER | Age: 72
End: 2022-03-01
Attending: NURSE PRACTITIONER
Payer: MEDICARE

## 2022-03-01 ENCOUNTER — INFUSION THERAPY VISIT (OUTPATIENT)
Dept: INFUSION THERAPY | Facility: OTHER | Age: 72
End: 2022-03-01
Attending: INTERNAL MEDICINE
Payer: MEDICARE

## 2022-03-01 ENCOUNTER — ONCOLOGY VISIT (OUTPATIENT)
Dept: ONCOLOGY | Facility: OTHER | Age: 72
End: 2022-03-01
Attending: NURSE PRACTITIONER
Payer: MEDICARE

## 2022-03-01 VITALS
HEIGHT: 63 IN | TEMPERATURE: 99.1 F | SYSTOLIC BLOOD PRESSURE: 114 MMHG | RESPIRATION RATE: 19 BRPM | HEART RATE: 94 BPM | BODY MASS INDEX: 29.26 KG/M2 | OXYGEN SATURATION: 98 % | WEIGHT: 165.12 LBS | DIASTOLIC BLOOD PRESSURE: 62 MMHG

## 2022-03-01 DIAGNOSIS — C90.00 MULTIPLE MYELOMA NOT HAVING ACHIEVED REMISSION (H): Primary | ICD-10-CM

## 2022-03-01 DIAGNOSIS — C90.00 MULTIPLE MYELOMA NOT HAVING ACHIEVED REMISSION (H): ICD-10-CM

## 2022-03-01 LAB
ALBUMIN SERPL-MCNC: 3.7 G/DL (ref 3.4–5)
ALP SERPL-CCNC: 103 U/L (ref 40–150)
ALT SERPL W P-5'-P-CCNC: 16 U/L (ref 0–50)
ANION GAP SERPL CALCULATED.3IONS-SCNC: 6 MMOL/L (ref 3–14)
AST SERPL W P-5'-P-CCNC: 12 U/L (ref 0–45)
BASOPHILS # BLD AUTO: 0 10E3/UL (ref 0–0.2)
BASOPHILS NFR BLD AUTO: 1 %
BILIRUB SERPL-MCNC: 0.4 MG/DL (ref 0.2–1.3)
BUN SERPL-MCNC: 20 MG/DL (ref 7–30)
CALCIUM SERPL-MCNC: 9.4 MG/DL (ref 8.5–10.1)
CHLORIDE BLD-SCNC: 104 MMOL/L (ref 94–109)
CO2 SERPL-SCNC: 26 MMOL/L (ref 20–32)
CREAT SERPL-MCNC: 0.73 MG/DL (ref 0.52–1.04)
EOSINOPHIL # BLD AUTO: 0.2 10E3/UL (ref 0–0.7)
EOSINOPHIL NFR BLD AUTO: 3 %
ERYTHROCYTE [DISTWIDTH] IN BLOOD BY AUTOMATED COUNT: 14.2 % (ref 10–15)
GFR SERPL CREATININE-BSD FRML MDRD: 86 ML/MIN/1.73M2
GLUCOSE BLD-MCNC: 140 MG/DL (ref 70–99)
HCT VFR BLD AUTO: 40 % (ref 35–47)
HGB BLD-MCNC: 13.7 G/DL (ref 11.7–15.7)
IMM GRANULOCYTES # BLD: 0.1 10E3/UL
IMM GRANULOCYTES NFR BLD: 1 %
LYMPHOCYTES # BLD AUTO: 0.7 10E3/UL (ref 0.8–5.3)
LYMPHOCYTES NFR BLD AUTO: 13 %
MCH RBC QN AUTO: 31.4 PG (ref 26.5–33)
MCHC RBC AUTO-ENTMCNC: 34.3 G/DL (ref 31.5–36.5)
MCV RBC AUTO: 92 FL (ref 78–100)
MONOCYTES # BLD AUTO: 0.4 10E3/UL (ref 0–1.3)
MONOCYTES NFR BLD AUTO: 7 %
NEUTROPHILS # BLD AUTO: 4.4 10E3/UL (ref 1.6–8.3)
NEUTROPHILS NFR BLD AUTO: 75 %
NRBC # BLD AUTO: 0 10E3/UL
NRBC BLD AUTO-RTO: 0 /100
PLATELET # BLD AUTO: 243 10E3/UL (ref 150–450)
POTASSIUM BLD-SCNC: 3.8 MMOL/L (ref 3.4–5.3)
PROT SERPL-MCNC: 7 G/DL (ref 6.8–8.8)
RBC # BLD AUTO: 4.37 10E6/UL (ref 3.8–5.2)
SODIUM SERPL-SCNC: 136 MMOL/L (ref 133–144)
WBC # BLD AUTO: 5.8 10E3/UL (ref 4–11)

## 2022-03-01 PROCEDURE — 99215 OFFICE O/P EST HI 40 MIN: CPT | Performed by: NURSE PRACTITIONER

## 2022-03-01 PROCEDURE — 82232 ASSAY OF BETA-2 PROTEIN: CPT | Mod: ZL | Performed by: NURSE PRACTITIONER

## 2022-03-01 PROCEDURE — 86334 IMMUNOFIX E-PHORESIS SERUM: CPT | Mod: ZL | Performed by: STUDENT IN AN ORGANIZED HEALTH CARE EDUCATION/TRAINING PROGRAM

## 2022-03-01 PROCEDURE — 84155 ASSAY OF PROTEIN SERUM: CPT | Mod: ZL | Performed by: NURSE PRACTITIONER

## 2022-03-01 PROCEDURE — 85025 COMPLETE CBC W/AUTO DIFF WBC: CPT | Mod: ZL | Performed by: INTERNAL MEDICINE

## 2022-03-01 PROCEDURE — 85810 BLOOD VISCOSITY EXAMINATION: CPT | Mod: ZL | Performed by: NURSE PRACTITIONER

## 2022-03-01 PROCEDURE — G0463 HOSPITAL OUTPT CLINIC VISIT: HCPCS | Mod: 25

## 2022-03-01 PROCEDURE — 80053 COMPREHEN METABOLIC PANEL: CPT | Mod: ZL | Performed by: INTERNAL MEDICINE

## 2022-03-01 PROCEDURE — 82784 ASSAY IGA/IGD/IGG/IGM EACH: CPT | Mod: ZL | Performed by: NURSE PRACTITIONER

## 2022-03-01 PROCEDURE — 83521 IG LIGHT CHAINS FREE EACH: CPT | Mod: ZL,XU | Performed by: NURSE PRACTITIONER

## 2022-03-01 PROCEDURE — 84165 PROTEIN E-PHORESIS SERUM: CPT | Mod: ZL | Performed by: STUDENT IN AN ORGANIZED HEALTH CARE EDUCATION/TRAINING PROGRAM

## 2022-03-01 PROCEDURE — 250N000013 HC RX MED GY IP 250 OP 250 PS 637: Performed by: INTERNAL MEDICINE

## 2022-03-01 PROCEDURE — 250N000011 HC RX IP 250 OP 636: Performed by: INTERNAL MEDICINE

## 2022-03-01 PROCEDURE — 36415 COLL VENOUS BLD VENIPUNCTURE: CPT | Mod: ZL | Performed by: INTERNAL MEDICINE

## 2022-03-01 PROCEDURE — 96401 CHEMO ANTI-NEOPL SQ/IM: CPT

## 2022-03-01 PROCEDURE — 96372 THER/PROPH/DIAG INJ SC/IM: CPT

## 2022-03-01 PROCEDURE — G0463 HOSPITAL OUTPT CLINIC VISIT: HCPCS

## 2022-03-01 RX ORDER — DEXAMETHASONE 4 MG/1
TABLET ORAL
Qty: 20 TABLET | Refills: 4 | Status: SHIPPED | OUTPATIENT
Start: 2022-03-01 | End: 2022-11-16

## 2022-03-01 RX ORDER — ACETAMINOPHEN 325 MG/1
650 TABLET ORAL ONCE
Status: COMPLETED | OUTPATIENT
Start: 2022-03-01 | End: 2022-03-01

## 2022-03-01 RX ORDER — DIPHENHYDRAMINE HCL 50 MG
50 CAPSULE ORAL ONCE
Status: COMPLETED | OUTPATIENT
Start: 2022-03-01 | End: 2022-03-01

## 2022-03-01 RX ADMIN — DIPHENHYDRAMINE HYDROCHLORIDE 50 MG: 50 CAPSULE ORAL at 14:48

## 2022-03-01 RX ADMIN — BORTEZOMIB 2.4 MG: 3.5 INJECTION, POWDER, LYOPHILIZED, FOR SOLUTION INTRAVENOUS; SUBCUTANEOUS at 15:20

## 2022-03-01 RX ADMIN — DARATUMUMAB AND HYALURONIDASE-FIHJ (HUMAN RECOMBINANT) 1800 MG: 1800; 30000 INJECTION SUBCUTANEOUS at 15:21

## 2022-03-01 RX ADMIN — ACETAMINOPHEN 650 MG: 325 TABLET, FILM COATED ORAL at 14:47

## 2022-03-01 ASSESSMENT — PAIN SCALES - GENERAL: PAINLEVEL: NO PAIN (0)

## 2022-03-01 NOTE — PROGRESS NOTES
Patient is 71 years old, here today accompanied by self for injection of faspro/velcade per order of and under the supervision of Dr. Walker.     Lab values are:    Component      Latest Ref Rng & Units 3/1/2022   WBC      4.0 - 11.0 10e3/uL 5.8   RBC Count      3.80 - 5.20 10e6/uL 4.37   Hemoglobin      11.7 - 15.7 g/dL 13.7   Hematocrit      35.0 - 47.0 % 40.0   MCV      78 - 100 fL 92   MCH      26.5 - 33.0 pg 31.4   MCHC      31.5 - 36.5 g/dL 34.3   RDW      10.0 - 15.0 % 14.2   Platelet Count      150 - 450 10e3/uL 243   % Neutrophils      % 75   % Lymphocytes      % 13   % Monocytes      % 7   % Eosinophils      % 3   % Basophils      % 1   % Immature Granulocytes      % 1   NRBCs per 100 WBC      <1 /100 0   Absolute Neutrophils      1.6 - 8.3 10e3/uL 4.4   Absolute Lymphocytes      0.8 - 5.3 10e3/uL 0.7 (L)   Absolute Monocytes      0.0 - 1.3 10e3/uL 0.4   Absolute Eosinophils      0.0 - 0.7 10e3/uL 0.2   Absolute Basophils      0.0 - 0.2 10e3/uL 0.0   Absolute Immature Granulocytes      <=0.4 10e3/uL 0.1   Absolute NRBCs      10e3/uL 0.0   Sodium      133 - 144 mmol/L 136   Potassium      3.4 - 5.3 mmol/L 3.8   Chloride      94 - 109 mmol/L 104   Carbon Dioxide      20 - 32 mmol/L 26   Anion Gap      3 - 14 mmol/L 6   Urea Nitrogen      7 - 30 mg/dL 20   Creatinine      0.52 - 1.04 mg/dL 0.73   Calcium      8.5 - 10.1 mg/dL 9.4   Glucose      70 - 99 mg/dL 140 (H)   Alkaline Phosphatase      40 - 150 U/L 103   AST      0 - 45 U/L 12   ALT      0 - 50 U/L 16   Protein Total      6.8 - 8.8 g/dL 7.0   Albumin      3.4 - 5.0 g/dL 3.7   Bilirubin Total      0.2 - 1.3 mg/dL 0.4   GFR Estimate      >60 mL/min/1.73m2 86       Labs meet parameters for today's injection.     Independent dose check completed with Ruby MORALES RN.     Oncology toxicity assessment, fall risk, abuse screening, med reconciliation completed during provider visit today.      Patient denies questions nor concerns regarding medication,  administration site, side effects, nor aftercare.  Patient identified with two identifiers, order verified, and verbal consent for today's injection obtained from patient.   Patient education provided on s/s of injection site infection, and/or medication specific side effects, and when to call a provider.  Patient instructed to report any adverse effects.     FASPRO administered per protocol in left lower quadrant of abdomen at 45 degree angle per protocol of rotating sites.  Site covered with sterile bandage.  Patient tolerated injection well, no verbal nor non-verbal signs of discomfort noted.  No adverse effects noted at this time.     Velcade administered per protocol in left upper outer arm at a 45 degree angle per protocol of rotating sites.  Site covered with sterile bandage.  Patient tolerated injection well, no verbal nor non-verbal signs of discomfort noted.  No adverse effects noted at this time.     Patient instructed to call with further questions or concerns.  Patient states understanding and is in agreement with this plan.  Patient discharged.

## 2022-03-01 NOTE — PATIENT INSTRUCTIONS
Thank you for allowing me to be a part of your care today.    We would like to see you back in 3 weeks, as per schedule. I will come find you in infusion on Friday to review labs with you (no appt needed).     If you have any questions please call [phone]    Other instructions:      None

## 2022-03-01 NOTE — NURSING NOTE
"Oncology Rooming Note    March 1, 2022 1:58 PM   Carmelita Watts is a 71 year old female who presents for:    Chief Complaint   Patient presents with     Oncology Clinic Visit     Follow up. Multiple myeloma not having achieved remission      Initial Vitals: /62   Pulse 94   Temp 99.1  F (37.3  C) (Tympanic)   Resp 19   Ht 1.6 m (5' 3\")   Wt 74.9 kg (165 lb 2 oz)   SpO2 98%   BMI 29.25 kg/m   Estimated body mass index is 29.25 kg/m  as calculated from the following:    Height as of this encounter: 1.6 m (5' 3\").    Weight as of this encounter: 74.9 kg (165 lb 2 oz). Body surface area is 1.82 meters squared.  No Pain (0) Comment: Data Unavailable   No LMP recorded. Patient is postmenopausal.  Allergies reviewed: Yes  Medications reviewed: Yes    Medications: Medication refills not needed today.  Pharmacy name entered into [facility]:    [id number] [place], [place] - [address] [facility] [address]  [facility] - [place], [place] - [address]              "

## 2022-03-01 NOTE — PROGRESS NOTES
Oncology Follow-up Visit    Reason for Visit:  Carmelita is a 71 year old woman with a diagnosis of multiple myeloma, who presents to the clinic today for consideration of ongoing therapy. She is due today to commence C21 Darzalex Faspro, Velcade, and Dexamethasone.     Nursing Note and documentation reviewed: Yes.    Interval History:   Carmelita reports doing and feeling well. She denies any obvious concerns today. No recent signs of infection. No fever, chills, chest pain, cough, or SOB. No bleeding concerns or issues. Denies nausea or vomiting. States that she has been trying to eat a bit lighter and that has been working well for her and her digestion. No bowel concerns. Some erythema at previous velcade injection sites but no other skin concerns. Energy levels are great. Doing her own snow removal and other chores. She has finished up PT. Continues doing exercise at home. Notes that PT did talk to her about a WEL exercise program. She wants to hold off on that at this point, but would like to consider in the future. Overall, she seems to be dong well.     Oncologic History  12/31/2018 Presented to the emergency room with vertigo and fatigue.  CT scan of the head was negative and subsequent stress test was negative.  5/3/2019 PCP ordered lab work and noted a total protein of 12.9.  SPEP at that time showed an M spike of 6.2 with a large monoclonal protein seen in the gamma fraction. Urine immunofixation showed a possible small protein band in the gamma fraction  5/31/2019 Evaluated by Dr. Walker with Medical Oncology with plan to rule out myeloma and obtain bone marrow aspiration biopsy as well as a metastatic bone survey along with additional labwork  6/18/2019 Underwent bone marrow aspiration and biopsy  6/24/2019 Seen again by Dr. Walker and CBC showed a hemoglobin of 9.3, M spike 7.3 with monoclonal IgG immunoglobulin of kappa light chain type; serum viscosity was 2.9; quantitative immunoglobulins showed an  IgG of 8160, beta-2 microglobulin was 5.8, BUN was 21 with creatinine is 0.8 and total protein was 13.7.  Quantitative kappa/lambda free light chains showed an elevated ratio of 17.0 bone marrow aspiration biopsy showed plasma cell myeloma with approximately 80% plasma cells.  Immunofixation showed IgG kappa and flow cytometry revealed kappa monotypic plasma cells consistent with clonal plasma cell neoplasm and FISH panel was pending at that time.  It was felt she had at least stage II disease based on her beta-2 microglobulin and anemia.  Plan was to treat with Velcade 1.3 mg per/m2 days 1, 4, 8 and 11/Decadron 40 mg on days 1, 8 and 15. Initiation of Revlimid with C2 at 25 mg daily days 1 through 14.  Plan was to also obtain an MRI of the lumbar spine to rule out lytic lesion at L3.  She was initiated on Zovirax 400 mg p.o. twice daily.  6/25/2019 C1 of chemotherapy initiated  7/1/2019 Note in chart regarding patient's large co-pay for the Revlimid and no plan at this point to initiate Revlimid and treat only with Velcade and Decadron per Dr. Walker  7/11/2019 MRI lumbar spine shows a pathologic superior endplate compression fracture at L3 without evidence of retropulsed fragment and innumerable enhancing lesions throughout the lumbar spine consistent with history of multiple myeloma.  9/10/2019 Increasing M-spike and kappa lambda ratio  10/1/2019 Initiation of CyBorD  11/17/2021 Stabilization of M-spike at 1.2  12/1/2020 Initiation of Darzalex Faspro injection  3/23/2021 M-spike increased form 1.0 to 1.2 and velcade and dexamethasone added to plan     Current Chemo Regime/TX:  Darzalex faspro injection 1800mg subcutaneous per protocol on Day 1 with velcade 1.3mg/m2 on days 1, 4, 8, 11 with weekly Dexamethasone, given every 21 days      **Zometa 4mg every 3 months  Current Cycle: C21D1  # of completed cycles:  20 (Velcade & Dex added C5)     Previous treatment:   Velcade 1.3 mg/m2 days 1, 4, 8 and 11 with  "Decadron 40 mg days 1, 8 and 15 x 4 cycles;   Velcade 1.5mg.m2/cyclophosphamide 150mg every 7 days on days 1,8,15 and 22/decadron 40mg days 1,8,15,22 ; Darzalex faspro injection 1800mg subcutaneous per protocol    Past Medical History:   Diagnosis Date     Arthritis      Depressive disorder      Diabetes mellitus, type 2 (H) 1/18/2021     Essential hypertension 10/1/2015     Major depressive disorder, recurrent episode, mild (H) 10/1/2015     Mixed hyperlipidemia 10/1/2015     Multiple myeloma not having achieved remission (H) 6/24/2019     Other specified disorders of bladder 07/09/2012    Irritable Bladder     Seasonal allergies 10/1/2015     Unspecified essential hypertension 03/19/2007     Unspecified sinusitis (chronic) 09/05/2007       Past Surgical History:   Procedure Laterality Date     APPENDECTOMY       BONE MARROW BIOPSY, BONE SPECIMEN, NEEDLE/TROCAR N/A 6/18/2019    Procedure: BONE MARROW BIOPSY;  Surgeon: Maciej Sanz MD;  Location: [facility]     CHOLECYSTECTOMY       COLONOSCOPY  07-    repeat 10 years     COLONOSCOPY N/A 12/30/2016    Procedure: COLONOSCOPY;  Surgeon: Bhaskar Franklin DO;  Location: [facility]     SINUS SURGERY       TUBAL STERILIZATION         Family History   Problem Relation Age of Onset     Breast Cancer Mother 66        Cause of Death     Parkinsonism Father         \"Possible\"     Coronary Artery Disease Father      Pacemaker Father      Thyroid Disease Daughter      Diabetes No family hx of      Hypertension No family hx of      Hyperlipidemia No family hx of      Cerebrovascular Disease No family hx of      Colon Cancer No family hx of      Prostate Cancer No family hx of      Genetic Disorder No family hx of      Asthma No family hx of      Anesthesia Reaction No family hx of        Social History     Socioeconomic History     Marital status:      Spouse name: Not on file     Number of children: Not on file     Years of education: Not on file     Highest " education level: Not on file   Occupational History     Occupation: Financial     Comment:  - ([employer])   Tobacco Use     Smoking status: Never Smoker     Smokeless tobacco: Never Used     Tobacco comment: Tried to Quit (YES); QUIT in 1971; Passive Exposure (NO)   Substance and Sexual Activity     Alcohol use: Yes     Comment: RARELY     Drug use: No     Sexual activity: Yes     Partners: Male     Birth control/protection: None   Other Topics Concern      Service Not Asked     Blood Transfusions Not Asked     Caffeine Concern Yes     Comment: Coffee >6 cups daily     Occupational Exposure Not Asked     Hobby Hazards Not Asked     Sleep Concern Not Asked     Stress Concern Not Asked     Weight Concern Not Asked     Special Diet Not Asked     Back Care Not Asked     Exercise Not Asked     Bike Helmet Not Asked     Seat Belt Not Asked     Self-Exams Not Asked     Parent/sibling w/ CABG, MI or angioplasty before 65F 55M? No   Social History Narrative     Not on file     Social Determinants of Health     Financial Resource Strain: Not on file   Food Insecurity: Not on file   Transportation Needs: Not on file   Physical Activity: Not on file   Stress: Not on file   Social Connections: Not on file   Intimate Partner Violence: Not on file   Housing Stability: Not on file       Current Outpatient Medications   Medication     acyclovir (ZOVIRAX) 400 MG tablet     aspirin (ASA) 81 MG chewable tablet     atorvastatin (LIPITOR) 10 MG tablet     dexamethasone (DECADRON) 4 MG tablet     fluticasone (FLONASE) 50 MCG/ACT nasal spray     L-Lysine HCl 500 MG CAPS     losartan (COZAAR) 50 MG tablet     sertraline (ZOLOFT) 50 MG tablet     EPINEPHrine (EPIPEN) 0.3 MG/0.3ML injection     prochlorperazine (COMPAZINE) 10 MG tablet     No current facility-administered medications for this visit.     Facility-Administered Medications Ordered in Other Visits   Medication     bortezomib (VELCADE) subcutaneous injection 2.4 mg  "    daratumumab-hyaluronidase-fij (DARZALEX FASPRO) SUBCUTANEOUS injection 1,800 mg        Allergies   Allergen Reactions     Lisinopril Cough     Phenylephrine Hcl Other (See Comments)     **Entex  HEADACHE (SEVERE)     Phenylpropanolamine Other (See Comments)     **Entex  HEADACHE (SEVERE)     Pseudoephedrine Tannate Other (See Comments)     **Entex  HEADACHE (SEVERE)     Levofloxacin Rash     **Levaquin     Moxifloxacin Hcl [Avelox] Rash       Review Of Systems:  A complete review of systems is negative except for the above mentioned items in the interval history.     ECOG Performance Status: 0    Physical Exam:  /62   Pulse 94   Temp 99.1  F (37.3  C) (Tympanic)   Resp 19   Ht 1.6 m (5' 3\")   Wt 74.9 kg (165 lb 2 oz)   SpO2 98%   BMI 29.25 kg/m    GENERAL APPEARANCE: Healthy, alert and in no acute distress.  HEENT: Eyes appear normal without scleral icterus. Extraocular movements intact. Mouth appears normal without lesions, ulcers, or thrush.  NECK:   Supple with normal range of motion. No asymmetry or masses.  LYMPHATICS: No palpable cervical, supraclavicular, axillary nodes.  RESP: Lungs clear to auscultation bilaterally, respirations regular and easy.  CARDIOVASCULAR: Regular rate and rhythm. Normal S1, S2; no murmur, gallop, or rub.  ABDOMEN: Soft, non-tender, non-distended. Bowel sounds auscultated all 4 quadrants. No palpable organomegaly or masses.  MUSCULOSKELETAL: Extremities without gross deformities noted. No edema of bilateral lower extremities.  SKIN: No suspicious lesions or rashes.  NEURO: Alert and oriented x 3.  Gait steady.  PSYCHIATRIC: Mentation and affect appear normal.  Mood appropriate.    Laboratory: Myeloma studies were just drawn today so are pending.   Results for orders placed or performed in visit on 03/01/22   Comprehensive metabolic panel     Status: Abnormal   Result Value Ref Range    Sodium 136 133 - 144 mmol/L    Potassium 3.8 3.4 - 5.3 mmol/L    Chloride 104 94 " - 109 mmol/L    Carbon Dioxide (CO2) 26 20 - 32 mmol/L    Anion Gap 6 3 - 14 mmol/L    Urea Nitrogen 20 7 - 30 mg/dL    Creatinine 0.73 0.52 - 1.04 mg/dL    Calcium 9.4 8.5 - 10.1 mg/dL    Glucose 140 (H) 70 - 99 mg/dL    Alkaline Phosphatase 103 40 - 150 U/L    AST 12 0 - 45 U/L    ALT 16 0 - 50 U/L    Protein Total 7.0 6.8 - 8.8 g/dL    Albumin 3.7 3.4 - 5.0 g/dL    Bilirubin Total 0.4 0.2 - 1.3 mg/dL    GFR Estimate 86 >60 mL/min/1.73m2   CBC with platelets and differential     Status: Abnormal   Result Value Ref Range    WBC Count 5.8 4.0 - 11.0 10e3/uL    RBC Count 4.37 3.80 - 5.20 10e6/uL    Hemoglobin 13.7 11.7 - 15.7 g/dL    Hematocrit 40.0 35.0 - 47.0 %    MCV 92 78 - 100 fL    MCH 31.4 26.5 - 33.0 pg    MCHC 34.3 31.5 - 36.5 g/dL    RDW 14.2 10.0 - 15.0 %    Platelet Count 243 150 - 450 10e3/uL    % Neutrophils 75 %    % Lymphocytes 13 %    % Monocytes 7 %    % Eosinophils 3 %    % Basophils 1 %    % Immature Granulocytes 1 %    NRBCs per 100 WBC 0 <1 /100    Absolute Neutrophils 4.4 1.6 - 8.3 10e3/uL    Absolute Lymphocytes 0.7 (L) 0.8 - 5.3 10e3/uL    Absolute Monocytes 0.4 0.0 - 1.3 10e3/uL    Absolute Eosinophils 0.2 0.0 - 0.7 10e3/uL    Absolute Basophils 0.0 0.0 - 0.2 10e3/uL    Absolute Immature Granulocytes 0.1 <=0.4 10e3/uL    Absolute NRBCs 0.0 10e3/uL   Protein electrophoresis     Status: None (In process)    Narrative    The following orders were created for panel order Protein electrophoresis.  Procedure                               Abnormality         Status                     ---------                               -----------         ------                     Total Protein, Serum for...[[id number]]                      In process                 Protein Electrophoresis,...[[id number]]                      In process                   Please view results for these tests on the individual orders.   CBC with platelets differential     Status: Abnormal    Narrative    The following orders were  created for panel order CBC with platelets differential.  Procedure                               Abnormality         Status                     ---------                               -----------         ------                     CBC with platelets and d...[[id number]]  Abnormal            Final result                 Please view results for these tests on the individual orders.       Imaging Studies:  None this visit.     ASSESSMENT/PLAN:  #1 Multiple myeloma:   IgG kappa multiple myeloma with 80% involvement of the bone marrow diagnosed June 2019 initially treated with Velcade and Decadron and received 4 cycles with increasing M-spike and kappa/lambda ratio.  Treatment changed to CyBorD on 10/1/2019.  M-spike plateau at 1.2 and treatment changed to monotherapy with Darzalex Faspro injection. M spike declined to 1.0 then increased again to 1.2 so Velcade and Dex added to her treatment plan with cycle 5 therapy. She is due for C21 therapy. Unfortunately, her big labs weren't drawn in advance so they were drawn today. She seems to be doing well, so we will continue with therapy without change. She will return for Day 4, Day 8, and Day 1 therapy. I will see her back in 3 weeks, sooner with concerns.      #2 Lytic lesions: Continue with the Zometa every 3 months. Last given 12/14/2021 so due again March 2022. She remains on calcium with vitamin D twice a day. She does note that she may have dental work coming up. I told her that as big of a heads-up and she can give us the better. Hopeful to hold zometa for at least 8 weeks prior and after work is completed to facilitate healing and minimize risk of ONJ. We will tentatively plan zometa for C23D1.     Patient in agreement with plan and verbalizes understanding. Agrees to call with any questions or concerns.    51 minutes spent in the patient's encounter today with time spent in review of patient's chart along with chart preparation and review of the treatment plan and  signing of treatment plan.  Time was also spent with the patient in obtaining a review of systems and performing a physical exam along with detailed review of all test results. Time was also spent in discussing plan for future follow-up and relating instructions for follow-up and in placing future orders.    FLO Duque, FNP-C

## 2022-03-02 LAB — TOTAL PROTEIN SERUM FOR ELP: 6.4 G/DL (ref 6.8–8.8)

## 2022-03-03 LAB
ALBUMIN SERPL ELPH-MCNC: 4.2 G/DL (ref 3.7–5.1)
ALPHA1 GLOB SERPL ELPH-MCNC: 0.3 G/DL (ref 0.2–0.4)
ALPHA2 GLOB SERPL ELPH-MCNC: 0.7 G/DL (ref 0.5–0.9)
B-GLOBULIN SERPL ELPH-MCNC: 0.6 G/DL (ref 0.6–1)
B2 MICROGLOB TUMOR MARKER SER-MCNC: 2.5 MG/L
GAMMA GLOB SERPL ELPH-MCNC: 0.6 G/DL (ref 0.7–1.6)
IGA SERPL-MCNC: 11 MG/DL (ref 84–499)
IGG SERPL-MCNC: 671 MG/DL (ref 610–1616)
IGM SERPL-MCNC: 12 MG/DL (ref 35–242)
KAPPA LC FREE SER-MCNC: 0.44 MG/DL (ref 0.33–1.94)
KAPPA LC FREE/LAMBDA FREE SER NEPH: 1.76 {RATIO} (ref 0.26–1.65)
LAMBDA LC FREE SERPL-MCNC: 0.25 MG/DL (ref 0.57–2.63)
M PROTEIN SERPL ELPH-MCNC: 0.3 G/DL
PROT PATTERN SERPL ELPH-IMP: ABNORMAL
PROT PATTERN SERPL IFE-IMP: NORMAL
VISC SER: 1.4 CP (ref 1.4–1.8)

## 2022-03-03 PROCEDURE — 86334 IMMUNOFIX E-PHORESIS SERUM: CPT | Performed by: STUDENT IN AN ORGANIZED HEALTH CARE EDUCATION/TRAINING PROGRAM

## 2022-03-03 PROCEDURE — 84165 PROTEIN E-PHORESIS SERUM: CPT | Mod: 26 | Performed by: STUDENT IN AN ORGANIZED HEALTH CARE EDUCATION/TRAINING PROGRAM

## 2022-03-04 ENCOUNTER — INFUSION THERAPY VISIT (OUTPATIENT)
Dept: INFUSION THERAPY | Facility: OTHER | Age: 72
End: 2022-03-04
Attending: INTERNAL MEDICINE
Payer: MEDICARE

## 2022-03-04 VITALS
DIASTOLIC BLOOD PRESSURE: 68 MMHG | WEIGHT: 167.55 LBS | SYSTOLIC BLOOD PRESSURE: 128 MMHG | HEART RATE: 68 BPM | BODY MASS INDEX: 29.68 KG/M2

## 2022-03-04 DIAGNOSIS — C90.00 MULTIPLE MYELOMA NOT HAVING ACHIEVED REMISSION (H): Primary | ICD-10-CM

## 2022-03-04 PROCEDURE — 250N000011 HC RX IP 250 OP 636: Mod: JW | Performed by: INTERNAL MEDICINE

## 2022-03-04 PROCEDURE — 96411 CHEMO IV PUSH ADDL DRUG: CPT

## 2022-03-04 PROCEDURE — 96409 CHEMO IV PUSH SNGL DRUG: CPT

## 2022-03-04 RX ADMIN — BORTEZOMIB 2.4 MG: 3.5 INJECTION, POWDER, LYOPHILIZED, FOR SOLUTION INTRAVENOUS; SUBCUTANEOUS at 13:08

## 2022-03-04 NOTE — PROGRESS NOTES
Confirmed with care coordination that patient is in fact not to have zometa until dental work has been completed.  Expected to resume in April, per oncologist.  Please refer to Telephone Encounter 2/14.  Patient appointment notes updated to reflect correct information.

## 2022-03-04 NOTE — PROGRESS NOTES
Patient is a 71 year old female  her today for injection of Velcade per order of . Patient identified with two identifiers, order verified, and verbal consent for today's infusion obtained from patient. Written consent for treatment is on file and valid.    Recent lab values:   Labs not required for today's infusion   Patient meets order parameters for today's treatment.    Velcade dose verified with Barron RICH RN, prior to release of drug.      Patient denies questions or concerns regarding injection and/or medication(s) being administered.    Velcade injected SQ into Right back arm at 45 degree angle per protocol rotating sites. Patient tolerated injection without incident, no signs or symptoms of adverse reaction noted. Patient denies pain or discomfort.     Covered with a sterile bandage. Pt instructed to leave bandage intact for a minimum of one hour, and to call with questions or concerns. Copy of appointments, discharge instructions, and after visit summary (AVS) provided to patient. Patient states understanding, discharged.

## 2022-03-07 ENCOUNTER — TELEPHONE (OUTPATIENT)
Dept: ONCOLOGY | Facility: OTHER | Age: 72
End: 2022-03-07
Payer: COMMERCIAL

## 2022-03-07 NOTE — TELEPHONE ENCOUNTER
Notified patient of recent myeloma lab work. All looks stable. We will continue to monitor with each cycle.     Carlene appreciates call.     FLO Duque CNP

## 2022-03-08 ENCOUNTER — INFUSION THERAPY VISIT (OUTPATIENT)
Dept: INFUSION THERAPY | Facility: OTHER | Age: 72
End: 2022-03-08
Attending: INTERNAL MEDICINE
Payer: MEDICARE

## 2022-03-08 VITALS
TEMPERATURE: 99 F | BODY MASS INDEX: 29.37 KG/M2 | WEIGHT: 165.79 LBS | DIASTOLIC BLOOD PRESSURE: 62 MMHG | RESPIRATION RATE: 18 BRPM | SYSTOLIC BLOOD PRESSURE: 104 MMHG | OXYGEN SATURATION: 94 % | HEART RATE: 76 BPM

## 2022-03-08 DIAGNOSIS — C90.00 MULTIPLE MYELOMA NOT HAVING ACHIEVED REMISSION (H): Primary | ICD-10-CM

## 2022-03-08 LAB
BASOPHILS # BLD AUTO: 0 10E3/UL (ref 0–0.2)
BASOPHILS NFR BLD AUTO: 0 %
EOSINOPHIL # BLD AUTO: 0.2 10E3/UL (ref 0–0.7)
EOSINOPHIL NFR BLD AUTO: 4 %
ERYTHROCYTE [DISTWIDTH] IN BLOOD BY AUTOMATED COUNT: 13.9 % (ref 10–15)
HCT VFR BLD AUTO: 40.2 % (ref 35–47)
HGB BLD-MCNC: 13.8 G/DL (ref 11.7–15.7)
IMM GRANULOCYTES # BLD: 0.1 10E3/UL
IMM GRANULOCYTES NFR BLD: 1 %
LYMPHOCYTES # BLD AUTO: 1.2 10E3/UL (ref 0.8–5.3)
LYMPHOCYTES NFR BLD AUTO: 24 %
MCH RBC QN AUTO: 31.2 PG (ref 26.5–33)
MCHC RBC AUTO-ENTMCNC: 34.3 G/DL (ref 31.5–36.5)
MCV RBC AUTO: 91 FL (ref 78–100)
MONOCYTES # BLD AUTO: 0.9 10E3/UL (ref 0–1.3)
MONOCYTES NFR BLD AUTO: 17 %
NEUTROPHILS # BLD AUTO: 2.7 10E3/UL (ref 1.6–8.3)
NEUTROPHILS NFR BLD AUTO: 54 %
NRBC # BLD AUTO: 0 10E3/UL
NRBC BLD AUTO-RTO: 1 /100
PLATELET # BLD AUTO: 214 10E3/UL (ref 150–450)
RBC # BLD AUTO: 4.42 10E6/UL (ref 3.8–5.2)
WBC # BLD AUTO: 5.2 10E3/UL (ref 4–11)

## 2022-03-08 PROCEDURE — 36415 COLL VENOUS BLD VENIPUNCTURE: CPT | Mod: ZL | Performed by: INTERNAL MEDICINE

## 2022-03-08 PROCEDURE — 250N000011 HC RX IP 250 OP 636: Performed by: INTERNAL MEDICINE

## 2022-03-08 PROCEDURE — 96401 CHEMO ANTI-NEOPL SQ/IM: CPT

## 2022-03-08 PROCEDURE — 85025 COMPLETE CBC W/AUTO DIFF WBC: CPT | Mod: ZL | Performed by: INTERNAL MEDICINE

## 2022-03-08 RX ADMIN — BORTEZOMIB 2.4 MG: 3.5 INJECTION, POWDER, LYOPHILIZED, FOR SOLUTION INTRAVENOUS; SUBCUTANEOUS at 13:33

## 2022-03-08 ASSESSMENT — PAIN SCALES - GENERAL: PAINLEVEL: NO PAIN (0)

## 2022-03-08 NOTE — PROGRESS NOTES
Patient is 71 years old, here today for injection of Velcade per order of . Patient meets parameters for today's injection. Patient identified with two identifiers, order verified, and verbal consent for today's infusion obtained from patient. Written consent for treatment is on file and valid.    Component      Latest Ref Rng & Units 3/8/2022   WBC      4.0 - 11.0 10e3/uL 5.2   RBC Count      3.80 - 5.20 10e6/uL 4.42   Hemoglobin      11.7 - 15.7 g/dL 13.8   Hematocrit      35.0 - 47.0 % 40.2   MCV      78 - 100 fL 91   MCH      26.5 - 33.0 pg 31.2   MCHC      31.5 - 36.5 g/dL 34.3   RDW      10.0 - 15.0 % 13.9   Platelet Count      150 - 450 10e3/uL 214   % Neutrophils      % 54   % Lymphocytes      % 24   % Monocytes      % 17   % Eosinophils      % 4   % Basophils      % 0   % Immature Granulocytes      % 1   NRBCs per 100 WBC      <1 /100 1 (H)   Absolute Neutrophils      1.6 - 8.3 10e3/uL 2.7   Absolute Lymphocytes      0.8 - 5.3 10e3/uL 1.2   Absolute Monocytes      0.0 - 1.3 10e3/uL 0.9   Absolute Eosinophils      0.0 - 0.7 10e3/uL 0.2   Absolute Basophils      0.0 - 0.2 10e3/uL 0.0   Absolute Immature Granulocytes      <=0.4 10e3/uL 0.1   Absolute NRBCs      10e3/uL 0.0       Patient meets order parameters for today's treatment.    Patient denies questions or concerns regarding injection and/or medication(s) being administered.    Independent dose check completed with UZMA Strickland.    Velcade injected SQ into left upper outer arm at a 45 degree angle. Patient tolerated injection without incident, no signs or symptoms of adverse reaction noted. Patient denies pain or discomfort.     Covered with a sterile bandage. Pt instructed to leave bandage intact for a minimum of one hour, and to call with questions or concerns. Patient states understanding, discharged.

## 2022-03-11 ENCOUNTER — INFUSION THERAPY VISIT (OUTPATIENT)
Dept: INFUSION THERAPY | Facility: OTHER | Age: 72
End: 2022-03-11
Attending: INTERNAL MEDICINE
Payer: MEDICARE

## 2022-03-11 ENCOUNTER — ANCILLARY PROCEDURE (OUTPATIENT)
Dept: MAMMOGRAPHY | Facility: OTHER | Age: 72
End: 2022-03-11
Attending: PHYSICIAN ASSISTANT
Payer: MEDICARE

## 2022-03-11 ENCOUNTER — TELEPHONE (OUTPATIENT)
Dept: MAMMOGRAPHY | Facility: OTHER | Age: 72
End: 2022-03-11

## 2022-03-11 VITALS
HEART RATE: 77 BPM | WEIGHT: 167.77 LBS | BODY MASS INDEX: 29.72 KG/M2 | SYSTOLIC BLOOD PRESSURE: 107 MMHG | DIASTOLIC BLOOD PRESSURE: 68 MMHG

## 2022-03-11 DIAGNOSIS — Z12.31 VISIT FOR SCREENING MAMMOGRAM: ICD-10-CM

## 2022-03-11 DIAGNOSIS — C90.00 MULTIPLE MYELOMA NOT HAVING ACHIEVED REMISSION (H): Primary | ICD-10-CM

## 2022-03-11 LAB
BASOPHILS # BLD AUTO: 0 10E3/UL (ref 0–0.2)
BASOPHILS NFR BLD AUTO: 0 %
EOSINOPHIL # BLD AUTO: 0.2 10E3/UL (ref 0–0.7)
EOSINOPHIL NFR BLD AUTO: 4 %
ERYTHROCYTE [DISTWIDTH] IN BLOOD BY AUTOMATED COUNT: 13.7 % (ref 10–15)
HCT VFR BLD AUTO: 39.2 % (ref 35–47)
HGB BLD-MCNC: 13.1 G/DL (ref 11.7–15.7)
IMM GRANULOCYTES # BLD: 0.1 10E3/UL
IMM GRANULOCYTES NFR BLD: 2 %
LYMPHOCYTES # BLD AUTO: 1.2 10E3/UL (ref 0.8–5.3)
LYMPHOCYTES NFR BLD AUTO: 26 %
MCH RBC QN AUTO: 31.3 PG (ref 26.5–33)
MCHC RBC AUTO-ENTMCNC: 33.4 G/DL (ref 31.5–36.5)
MCV RBC AUTO: 94 FL (ref 78–100)
MONOCYTES # BLD AUTO: 0.7 10E3/UL (ref 0–1.3)
MONOCYTES NFR BLD AUTO: 16 %
NEUTROPHILS # BLD AUTO: 2.3 10E3/UL (ref 1.6–8.3)
NEUTROPHILS NFR BLD AUTO: 52 %
NRBC # BLD AUTO: 0 10E3/UL
NRBC BLD AUTO-RTO: 0 /100
PLATELET # BLD AUTO: 178 10E3/UL (ref 150–450)
RBC # BLD AUTO: 4.18 10E6/UL (ref 3.8–5.2)
WBC # BLD AUTO: 4.5 10E3/UL (ref 4–11)

## 2022-03-11 PROCEDURE — 96401 CHEMO ANTI-NEOPL SQ/IM: CPT

## 2022-03-11 PROCEDURE — 85025 COMPLETE CBC W/AUTO DIFF WBC: CPT | Mod: ZL

## 2022-03-11 PROCEDURE — 36415 COLL VENOUS BLD VENIPUNCTURE: CPT | Mod: ZL

## 2022-03-11 PROCEDURE — 250N000011 HC RX IP 250 OP 636: Performed by: INTERNAL MEDICINE

## 2022-03-11 PROCEDURE — 77067 SCR MAMMO BI INCL CAD: CPT | Mod: TC

## 2022-03-11 RX ADMIN — BORTEZOMIB 2.4 MG: 3.5 INJECTION, POWDER, LYOPHILIZED, FOR SOLUTION INTRAVENOUS; SUBCUTANEOUS at 13:29

## 2022-03-11 NOTE — PROGRESS NOTES
Patient is a 71 year old female  her today for injection of Velcade per order of . Patient identified with two identifiers, order verified, and verbal consent for today's infusion obtained from patient. Written consent for treatment is on file and valid.    Recent lab values: Labs are WNL reviewed by this writer Patient meets order parameters for today's treatment.    Velcade dose verified with Barron RICH RN, prior to release of drug.      Patient denies questions or concerns regarding injection and/or medication(s) being administered.    Velcade injected SQ into back right arm at 45 degree angle per protocol rotating sites. Patient tolerated injection without incident, no signs or symptoms of adverse reaction noted. Patient denies pain or discomfort.     Covered with a sterile bandage. Pt instructed to leave bandage intact for a minimum of one hour, and to call with questions or concerns. Copy of appointments, discharge instructions, and after visit summary (AVS) provided to patient. Patient states understanding, discharged.

## 2022-03-11 NOTE — PROGRESS NOTES
Patient reports no changes from previous assessment on Tuesday March 8th.  No changes to medication or allergies.    Peripheral labs ordered. Butterfly needle inserted into right antecubical Immediate blood return noted. Ordered labs drawn. Needle removed, covered with sterile guaze and coban. Patient tolerated well, denies pain or discomfort at this time.

## 2022-03-14 ENCOUNTER — HOSPITAL ENCOUNTER (EMERGENCY)
Facility: HOSPITAL | Age: 72
Discharge: HOME | End: 2022-03-14
Attending: NURSE PRACTITIONER | Admitting: NURSE PRACTITIONER
Payer: MEDICARE

## 2022-03-14 VITALS
OXYGEN SATURATION: 98 % | RESPIRATION RATE: 16 BRPM | SYSTOLIC BLOOD PRESSURE: 134 MMHG | HEART RATE: 75 BPM | DIASTOLIC BLOOD PRESSURE: 90 MMHG | TEMPERATURE: 98.7 F
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
## 2022-03-14 PROCEDURE — G0463 HOSPITAL OUTPT CLINIC VISIT: HCPCS

## 2022-03-14 PROCEDURE — 99213 OFFICE O/P EST LOW 20 MIN: CPT | Performed by: NURSE PRACTITIONER

## 2022-03-14 ASSESSMENT — ENCOUNTER SYMPTOMS
PSYCHIATRIC NEGATIVE: 1
MYALGIAS: 1
NUMBNESS: 0
WOUND: 1
FEVER: 1

## 2022-03-14 NOTE — ED PROVIDER NOTES
History     Chief Complaint   Patient presents with     Cat Bite     HPI  Carmelita Watts is a 71 year old female who took her cat to immunization clinic, they had her hold the cat, and it bit her in right thumb and forefinger.  . No numbness. Up to date on her Tetnus. Patient on Chemo .     Allergies:  Allergies   Allergen Reactions     Lisinopril Cough     Phenylephrine Hcl Other (See Comments)     **Entex  HEADACHE (SEVERE)     Phenylpropanolamine Other (See Comments)     **Entex  HEADACHE (SEVERE)     Pseudoephedrine Tannate Other (See Comments)     **Entex  HEADACHE (SEVERE)     Levofloxacin Rash     **Levaquin     Moxifloxacin Hcl [Avelox] Rash       Problem List:    Patient Active Problem List    Diagnosis Date Noted     SOB (shortness of breath) 09/20/2021     Priority: Medium     Diabetes mellitus, type 2 (H) 01/18/2021     Priority: Medium     Multiple myeloma not having achieved remission (H) 06/24/2019     Priority: Medium     ACP (advance care planning) 10/26/2016     Priority: Medium     Advance Care Planning 10/26/2016: ACP Review of Chart / Resources Provided:  Reviewed chart for advance care plan.  Carmelita Watts has no plan or code status on file. Discussed available resources and provided with information. Confirmed code status reflects current choices pending further ACP discussions.  Confirmed/documented legally designated decision makers.  Added by Alison Landeros             Major depressive disorder, recurrent episode, mild (H) 10/01/2015     Priority: Medium     Seasonal allergies 10/01/2015     Priority: Medium     Mixed hyperlipidemia 10/01/2015     Priority: Medium     Hyperlipidemia LDL goal <130 07/05/2013     Priority: Medium     Hypertension goal BP (blood pressure) < 140/80 07/05/2013     Priority: Medium     Advanced care planning/counseling discussion 07/09/2012     Priority: Medium        Past Medical History:    Past Medical History:   Diagnosis Date     Arthritis      Cyst of  "breast      Depressive disorder      Diabetes mellitus, type 2 (H) 1/18/2021     Essential hypertension 10/1/2015     Major depressive disorder, recurrent episode, mild (H) 10/1/2015     Mixed hyperlipidemia 10/1/2015     Multiple myeloma not having achieved remission (H) 6/24/2019     Other specified disorders of bladder 07/09/2012     Seasonal allergies 10/1/2015     Unspecified essential hypertension 03/19/2007     Unspecified sinusitis (chronic) 09/05/2007       Past Surgical History:    Past Surgical History:   Procedure Laterality Date     APPENDECTOMY       BONE MARROW BIOPSY, BONE SPECIMEN, NEEDLE/TROCAR N/A 6/18/2019    Procedure: BONE MARROW BIOPSY;  Surgeon: Maciej Sanz MD;  Location: [facility]     CHOLECYSTECTOMY       COLONOSCOPY  07-    repeat 10 years     COLONOSCOPY N/A 12/30/2016    Procedure: COLONOSCOPY;  Surgeon: Bhaskar Franklin DO;  Location: [facility]     PUNC/ASPIR BREAST CYST Left 1995    benign     SINUS SURGERY       TUBAL STERILIZATION         Family History:    Family History   Problem Relation Age of Onset     Breast Cancer Mother 60        Cause of Death     Parkinsonism Father         \"Possible\"     Coronary Artery Disease Father      Pacemaker Father      Thyroid Disease Daughter      Breast Cancer Maternal Aunt         over 50     Diabetes No family hx of      Hypertension No family hx of      Hyperlipidemia No family hx of      Cerebrovascular Disease No family hx of      Colon Cancer No family hx of      Prostate Cancer No family hx of      Genetic Disorder No family hx of      Asthma No family hx of      Anesthesia Reaction No family hx of        Social History:  Marital Status:   [5]  Social History     Tobacco Use     Smoking status: Never Smoker     Smokeless tobacco: Never Used     Tobacco comment: Tried to Quit (YES); QUIT in 1971; Passive Exposure (NO)   Substance Use Topics     Alcohol use: Yes     Comment: RARELY     Drug use: No        Medications:  "   amoxicillin-clavulanate (AUGMENTIN) 875-125 MG tablet  acyclovir (ZOVIRAX) 400 MG tablet  aspirin (ASA) 81 MG chewable tablet  atorvastatin (LIPITOR) 10 MG tablet  dexamethasone (DECADRON) 4 MG tablet  EPINEPHrine (EPIPEN) 0.3 MG/0.3ML injection  fluticasone (FLONASE) 50 MCG/ACT nasal spray  L-Lysine HCl 500 MG CAPS  losartan (COZAAR) 50 MG tablet  prochlorperazine (COMPAZINE) 10 MG tablet  sertraline (ZOLOFT) 50 MG tablet          Review of Systems   Constitutional: Positive for fever.   Musculoskeletal: Positive for myalgias.   Skin: Positive for wound.        Cat bite to right thumb( 2 wounds), and inex finger (1 wound)  .     Neurological: Negative for numbness.   Psychiatric/Behavioral: Negative.        Physical Exam   BP: 134/90  Pulse: 75  Temp: 98.7  F (37.1  C)  Resp: 16  SpO2: 98 %      Physical Exam  Constitutional:       General: She is not in acute distress.  Musculoskeletal:         General: Tenderness and signs of injury present. No swelling. Normal range of motion.   Skin:     General: Skin is warm and dry.      Capillary Refill: Capillary refill takes less than 2 seconds.      Comments: One cat bite to index finger, and two to thumd on right hand.     Neurological:      General: No focal deficit present.      Mental Status: She is alert and oriented to person, place, and time.      Sensory: No sensory deficit.      Comments: CMS intact to fingers/hand.     Psychiatric:         Mood and Affect: Mood normal.         Behavior: Behavior normal.         ED Course                 Procedures    Wounds cleaned with soap and water.         No results found for this or any previous visit (from the past 24 hour(s)).    Medications - No data to display    Assessments & Plan (with Medical Decision Making)     I have reviewed the nursing notes.    I have reviewed the findings, diagnosis, plan and need for follow up with the patient.      New Prescriptions    AMOXICILLIN-CLAVULANATE (AUGMENTIN) 875-125 MG TABLET     Take 1 tablet by mouth 2 times daily for 10 days       Final diagnoses:   Cat bite, initial encounter     ASSESSMENT / PLAN:  (W55.01XA) Cat bite, initial encounter  Comment:   Plan:   1. Keep wounds clean and dry  2. Augmentin 875mg  2 times a day for 10 days      3/14/2022   [facility]     Master Montague NP  03/14/22 1122

## 2022-03-14 NOTE — ED TRIAGE NOTES
Pt presents with cat bite on right index and thumb. Pt states she was bit about six months ago and was given a tetanus.

## 2022-03-15 ENCOUNTER — LAB (OUTPATIENT)
Dept: LAB | Facility: OTHER | Age: 72
End: 2022-03-15
Payer: MEDICARE

## 2022-03-15 DIAGNOSIS — C90.00 MULTIPLE MYELOMA NOT HAVING ACHIEVED REMISSION (H): ICD-10-CM

## 2022-03-15 LAB
BASOPHILS # BLD AUTO: 0 10E3/UL (ref 0–0.2)
BASOPHILS NFR BLD AUTO: 0 %
EOSINOPHIL # BLD AUTO: 0.1 10E3/UL (ref 0–0.7)
EOSINOPHIL NFR BLD AUTO: 1 %
ERYTHROCYTE [DISTWIDTH] IN BLOOD BY AUTOMATED COUNT: 14.2 % (ref 10–15)
HCT VFR BLD AUTO: 39.7 % (ref 35–47)
HGB BLD-MCNC: 13.5 G/DL (ref 11.7–15.7)
IMM GRANULOCYTES # BLD: 0.2 10E3/UL
IMM GRANULOCYTES NFR BLD: 2 %
LYMPHOCYTES # BLD AUTO: 0.5 10E3/UL (ref 0.8–5.3)
LYMPHOCYTES NFR BLD AUTO: 6 %
MCH RBC QN AUTO: 31.1 PG (ref 26.5–33)
MCHC RBC AUTO-ENTMCNC: 34 G/DL (ref 31.5–36.5)
MCV RBC AUTO: 92 FL (ref 78–100)
MONOCYTES # BLD AUTO: 0.5 10E3/UL (ref 0–1.3)
MONOCYTES NFR BLD AUTO: 6 %
NEUTROPHILS # BLD AUTO: 8 10E3/UL (ref 1.6–8.3)
NEUTROPHILS NFR BLD AUTO: 85 %
NRBC # BLD AUTO: 0 10E3/UL
NRBC BLD AUTO-RTO: 0 /100
PLATELET # BLD AUTO: 139 10E3/UL (ref 150–450)
RBC # BLD AUTO: 4.34 10E6/UL (ref 3.8–5.2)
WBC # BLD AUTO: 9.3 10E3/UL (ref 4–11)

## 2022-03-15 PROCEDURE — 85810 BLOOD VISCOSITY EXAMINATION: CPT | Mod: ZL

## 2022-03-15 PROCEDURE — 36415 COLL VENOUS BLD VENIPUNCTURE: CPT | Mod: ZL

## 2022-03-15 PROCEDURE — 82232 ASSAY OF BETA-2 PROTEIN: CPT | Mod: ZL

## 2022-03-15 PROCEDURE — 82784 ASSAY IGA/IGD/IGG/IGM EACH: CPT | Mod: ZL

## 2022-03-15 PROCEDURE — 85004 AUTOMATED DIFF WBC COUNT: CPT | Mod: ZL

## 2022-03-15 PROCEDURE — 84165 PROTEIN E-PHORESIS SERUM: CPT | Mod: ZL | Performed by: PATHOLOGY

## 2022-03-15 PROCEDURE — 84155 ASSAY OF PROTEIN SERUM: CPT | Mod: ZL

## 2022-03-15 PROCEDURE — 86334 IMMUNOFIX E-PHORESIS SERUM: CPT | Mod: ZL | Performed by: PATHOLOGY

## 2022-03-15 PROCEDURE — 83521 IG LIGHT CHAINS FREE EACH: CPT | Mod: ZL

## 2022-03-15 NOTE — PROGRESS NOTES
Oncology Follow-up Visit    Reason for Visit:  Carmelita is a 71 year old woman with a diagnosis of multiple myeloma, who presents to the clinic today for consideration of ongoing therapy. She is due today to commence C22D1 Darzalex Faspro, Velcade, and Dexamethasone. She is also due for her next zometa dose.     Nursing Note and documentation reviewed: Yes    Interval History:   Carlene reports that she is doing well. She denies any obvious concerns today. She did decrease her Zoloft dose to 25 mg. She wondered if that could be contributing to her fatigue and wanted to see. She hasn't noticed negative impacts on her mood. She has been trying to get out and do more with friends.     Since last being seen, she was seen in ED for a cat bite. She will take her last amoxicillin dose tonight. She warmth, redness, or pain around puncture sites. Healing well. No other signs of infection including fever, chills, chest pain, cough, or SOB.     No bleeding concerns or issues. No nausea or vomiting. Appetite is good. Bowels regular. Energy is down but as always, trying to stay busy. No skin concerns. Of note, she does have external hemorrhoids that bother her. She doesn't want to do anything about them now but notes that perhaps in the future she will. Also, she has dentist appt on 24th. May be having some work done.     Oncologic History  12/31/2018 Presented to the emergency room with vertigo and fatigue.  CT scan of the head was negative and subsequent stress test was negative.  5/3/2019 PCP ordered lab work and noted a total protein of 12.9.  SPEP at that time showed an M spike of 6.2 with a large monoclonal protein seen in the gamma fraction. Urine immunofixation showed a possible small protein band in the gamma fraction  5/31/2019 Evaluated by Dr. Walker with Medical Oncology with plan to rule out myeloma and obtain bone marrow aspiration biopsy as well as a metastatic bone survey along with additional  labwork  6/18/2019 Underwent bone marrow aspiration and biopsy  6/24/2019 Seen again by Dr. Walker and CBC showed a hemoglobin of 9.3, M spike 7.3 with monoclonal IgG immunoglobulin of kappa light chain type; serum viscosity was 2.9; quantitative immunoglobulins showed an IgG of 8160, beta-2 microglobulin was 5.8, BUN was 21 with creatinine is 0.8 and total protein was 13.7.  Quantitative kappa/lambda free light chains showed an elevated ratio of 17.0 bone marrow aspiration biopsy showed plasma cell myeloma with approximately 80% plasma cells.  Immunofixation showed IgG kappa and flow cytometry revealed kappa monotypic plasma cells consistent with clonal plasma cell neoplasm and FISH panel was pending at that time.  It was felt she had at least stage II disease based on her beta-2 microglobulin and anemia.  Plan was to treat with Velcade 1.3 mg per/m2 days 1, 4, 8 and 11/Decadron 40 mg on days 1, 8 and 15. Initiation of Revlimid with C2 at 25 mg daily days 1 through 14.  Plan was to also obtain an MRI of the lumbar spine to rule out lytic lesion at L3.  She was initiated on Zovirax 400 mg p.o. twice daily.  6/25/2019 C1 of chemotherapy initiated  7/1/2019 Note in chart regarding patient's large co-pay for the Revlimid and no plan at this point to initiate Revlimid and treat only with Velcade and Decadron per Dr. Walker  7/11/2019 MRI lumbar spine shows a pathologic superior endplate compression fracture at L3 without evidence of retropulsed fragment and innumerable enhancing lesions throughout the lumbar spine consistent with history of multiple myeloma.  9/10/2019 Increasing M-spike and kappa lambda ratio  10/1/2019 Initiation of CyBorD  11/17/2020 Plateau of M-spike at 1.2 after 36 cycles of CyBorD  12/1/2020 Initiation of Darzalex Faspro injection  3/23/2021 M-spike increased form 1.0 to 1.2 and velcade and dexamethasone added to plan     Current Chemo Regime/TX:  Darzalex faspro injection 1800mg subcutaneous  "per protocol on Day 1 with velcade 1.3mg/m2 on days 1, 4, 8, 11 with weekly Dexamethasone, given every 21 days      **Zometa 4mg every 3 months  Current Cycle: 22  # of completed cycles:  21  **Velcade & Dex added C5     Previous treatment:   Velcade 1.3 mg/m2 days 1, 4, 8 and 11 with Decadron 40 mg days 1, 8 and 15 x 4 cycles;   Velcade 1.5mg.m2/cyclophosphamide 150mg every 7 days on days 1,8,15 and 22/decadron 40mg days 1,8,15,22 ; Darzalex faspro injection 1800mg subcutaneous per protocol    Past Medical History:   Diagnosis Date     Arthritis      Cyst of breast      Depressive disorder      Diabetes mellitus, type 2 (H) 1/18/2021     Essential hypertension 10/1/2015     Major depressive disorder, recurrent episode, mild (H) 10/1/2015     Mixed hyperlipidemia 10/1/2015     Multiple myeloma not having achieved remission (H) 6/24/2019     Other specified disorders of bladder 07/09/2012    Irritable Bladder     Seasonal allergies 10/1/2015     Unspecified essential hypertension 03/19/2007     Unspecified sinusitis (chronic) 09/05/2007       Past Surgical History:   Procedure Laterality Date     APPENDECTOMY       BONE MARROW BIOPSY, BONE SPECIMEN, NEEDLE/TROCAR N/A 6/18/2019    Procedure: BONE MARROW BIOPSY;  Surgeon: Maciej Sanz MD;  Location: [facility]     CHOLECYSTECTOMY       COLONOSCOPY  07-    repeat 10 years     COLONOSCOPY N/A 12/30/2016    Procedure: COLONOSCOPY;  Surgeon: Bhaskar Franklin DO;  Location: [facility]     PUNC/ASPIR BREAST CYST Left 1995    benign     SINUS SURGERY       TUBAL STERILIZATION         Family History   Problem Relation Age of Onset     Breast Cancer Mother 60        Cause of Death     Parkinsonism Father         \"Possible\"     Coronary Artery Disease Father      Pacemaker Father      Thyroid Disease Daughter      Breast Cancer Maternal Aunt         over 50     Diabetes No family hx of      Hypertension No family hx of      Hyperlipidemia No family hx of      " Cerebrovascular Disease No family hx of      Colon Cancer No family hx of      Prostate Cancer No family hx of      Genetic Disorder No family hx of      Asthma No family hx of      Anesthesia Reaction No family hx of        Social History     Socioeconomic History     Marital status:      Spouse name: Not on file     Number of children: Not on file     Years of education: Not on file     Highest education level: Not on file   Occupational History     Occupation: Financial     Comment:  - ([employer])   Tobacco Use     Smoking status: Never Smoker     Smokeless tobacco: Never Used     Tobacco comment: Tried to Quit (YES); QUIT in 1971; Passive Exposure (NO)   Substance and Sexual Activity     Alcohol use: Yes     Comment: RARELY     Drug use: No     Sexual activity: Yes     Partners: Male     Birth control/protection: None   Other Topics Concern      Service Not Asked     Blood Transfusions Not Asked     Caffeine Concern Yes     Comment: Coffee >6 cups daily     Occupational Exposure Not Asked     Hobby Hazards Not Asked     Sleep Concern Not Asked     Stress Concern Not Asked     Weight Concern Not Asked     Special Diet Not Asked     Back Care Not Asked     Exercise Not Asked     Bike Helmet Not Asked     Seat Belt Not Asked     Self-Exams Not Asked     Parent/sibling w/ CABG, MI or angioplasty before 65F 55M? No   Social History Narrative     Not on file     Social Determinants of Health     Financial Resource Strain: Not on file   Food Insecurity: Not on file   Transportation Needs: Not on file   Physical Activity: Not on file   Stress: Not on file   Social Connections: Not on file   Intimate Partner Violence: Not on file   Housing Stability: Not on file       Current Outpatient Medications   Medication     acyclovir (ZOVIRAX) 400 MG tablet     aspirin (ASA) 81 MG chewable tablet     atorvastatin (LIPITOR) 10 MG tablet     dexamethasone (DECADRON) 4 MG tablet     fluticasone (FLONASE) 50  "MCG/ACT nasal spray     L-Lysine HCl 500 MG CAPS     losartan (COZAAR) 50 MG tablet     sertraline (ZOLOFT) 50 MG tablet     EPINEPHrine (EPIPEN) 0.3 MG/0.3ML injection     prochlorperazine (COMPAZINE) 10 MG tablet     No current facility-administered medications for this visit.     Facility-Administered Medications Ordered in Other Visits   Medication     bortezomib (VELCADE) subcutaneous injection 2.4 mg     daratumumab-hyaluronidase-fihj (DARZALEX FASPRO) SUBCUTANEOUS injection 1,800 mg        Allergies   Allergen Reactions     Lisinopril Cough     Phenylephrine Hcl Other (See Comments)     **Entex  HEADACHE (SEVERE)     Phenylpropanolamine Other (See Comments)     **Entex  HEADACHE (SEVERE)     Pseudoephedrine Tannate Other (See Comments)     **Entex  HEADACHE (SEVERE)     Levofloxacin Rash     **Levaquin     Moxifloxacin Hcl [Avelox] Rash       Review Of Systems:  A complete review of systems is negative except for the above mentioned items in the interval history.     ECOG Performance Status: 0    Physical Exam:  /74   Pulse 92   Temp 98.6  F (37  C) (Tympanic)   Resp 19   Ht 1.6 m (5' 3\")   Wt 75.6 kg (166 lb 10.7 oz)   SpO2 98%   BMI 29.52 kg/m    GENERAL APPEARANCE: Healthy, alert and in no acute distress.  HEENT: Eyes appear normal without scleral icterus. Extraocular movements intact. Mouth appears normal without lesions, ulcers, or thrush.  NECK:   Supple with normal range of motion. No asymmetry or masses.  LYMPHATICS: No palpable cervical, supraclavicular, axillary nodes.  RESP: Lungs clear to auscultation bilaterally, respirations regular and easy.  CARDIOVASCULAR: Regular rate and rhythm. Normal S1, S2; no murmur, gallop, or rub.  ABDOMEN: Soft, non-tender, non-distended. Bowel sounds auscultated all 4 quadrants. No palpable organomegaly or masses.  MUSCULOSKELETAL: Extremities without gross deformities noted. No edema of bilateral lower extremities.  SKIN: No suspicious lesions or " rashes.  NEURO: Alert and oriented x 3.  Gait steady.  PSYCHIATRIC: Mentation and affect appear normal.  Mood appropriate.    Laboratory: Myeloma studies drawn 3/15. Slight rise in mspike from 0.3 to 0.4. Light chains stable. Immunoglobulins stable. Macroglobulins stable.Slight rise in Beta 2 microglobulin.   Results for orders placed or performed in visit on 03/22/22   Comprehensive metabolic panel     Status: Abnormal   Result Value Ref Range    Sodium 135 133 - 144 mmol/L    Potassium 3.7 3.4 - 5.3 mmol/L    Chloride 102 94 - 109 mmol/L    Carbon Dioxide (CO2) 26 20 - 32 mmol/L    Anion Gap 7 3 - 14 mmol/L    Urea Nitrogen 18 7 - 30 mg/dL    Creatinine 0.70 0.52 - 1.04 mg/dL    Calcium 9.0 8.5 - 10.1 mg/dL    Glucose 131 (H) 70 - 99 mg/dL    Alkaline Phosphatase 117 40 - 150 U/L    AST 17 0 - 45 U/L    ALT 21 0 - 50 U/L    Protein Total 6.9 6.8 - 8.8 g/dL    Albumin 3.4 3.4 - 5.0 g/dL    Bilirubin Total 0.4 0.2 - 1.3 mg/dL    GFR Estimate >90 >60 mL/min/1.73m2   CBC with platelets and differential     Status: Abnormal   Result Value Ref Range    WBC Count 3.0 (L) 4.0 - 11.0 10e3/uL    RBC Count 4.30 3.80 - 5.20 10e6/uL    Hemoglobin 13.3 11.7 - 15.7 g/dL    Hematocrit 39.6 35.0 - 47.0 %    MCV 92 78 - 100 fL    MCH 30.9 26.5 - 33.0 pg    MCHC 33.6 31.5 - 36.5 g/dL    RDW 13.8 10.0 - 15.0 %    Platelet Count 212 150 - 450 10e3/uL    % Neutrophils 68 %    % Lymphocytes 16 %    % Monocytes 9 %    % Eosinophils 5 %    % Basophils 0 %    % Immature Granulocytes 2 %    NRBCs per 100 WBC 0 <1 /100    Absolute Neutrophils 2.1 1.6 - 8.3 10e3/uL    Absolute Lymphocytes 0.5 (L) 0.8 - 5.3 10e3/uL    Absolute Monocytes 0.3 0.0 - 1.3 10e3/uL    Absolute Eosinophils 0.1 0.0 - 0.7 10e3/uL    Absolute Basophils 0.0 0.0 - 0.2 10e3/uL    Absolute Immature Granulocytes 0.1 <=0.4 10e3/uL    Absolute NRBCs 0.0 10e3/uL   CBC with platelets and differential     Status: Abnormal    Narrative    The following orders were created for panel  order CBC with platelets and differential.  Procedure                               Abnormality         Status                     ---------                               -----------         ------                     CBC with platelets and d...[[id number]]  Abnormal            Final result                 Please view results for these tests on the individual orders.       Imaging Studies:  None this visit.     ASSESSMENT/PLAN:  #1 Multiple myeloma:   IgG kappa multiple myeloma with 80% involvement of the bone marrow diagnosed June 2019 initially treated with Velcade and Decadron and received 4 cycles with increasing M-spike and kappa/lambda ratio.  Treatment changed to CyBorD on 10/1/2019.  M-spike plateau at 1.2 and treatment changed to monotherapy with Darzalex Faspro injection. M spike declined to 1.0 then increased again to 1.2 so Velcade and Dex added to her treatment plan with cycle 5 therapy. Today, she is due for C22 therapy. Perhaps we are seeing a very slow rise in her m spike. I let her know that I didn't believe we needed to change anything in regards to her therapy at this time, but we will need to continue monitoring her labs and at some point in the future it may be necessary. For now, we will continue C22 without changes.      #2 Lytic lesions: Continue with the Zometa every 3 months. Last given 12/14/2021 so due again now. She remains on calcium with vitamin D twice a day. She meet with dentist in 2 days. I think we will hold Zometa this cycle and plan on resuming with C23.     #3 Cat bite Healing well without signs of infection. Finish antibiotics as prescribed.    #4 Depression Patient would like trying to cut back on her Zoloft dose to 25 mg daily to see if that helps with fatigue. I did let her know that it can take a while. I did advise her not to stop these medications cold-turkey in the future.      Patient in agreement with plan and verbalizes understanding. Agrees to call with any questions  or concerns.    55 minutes spent in the patient's encounter today with time spent in review of patient's chart along with chart preparation and review of the treatment plan and signing of treatment plan.  Time was also spent with the patient in obtaining a review of systems and performing a physical exam along with detailed review of all test results. Time was also spent in discussing plan for future follow-up and relating instructions for follow-up and in placing future orders.    FLO Duque, FNP-C

## 2022-03-16 LAB — TOTAL PROTEIN SERUM FOR ELP: 6.8 G/DL (ref 6.8–8.8)

## 2022-03-17 LAB
ALBUMIN SERPL ELPH-MCNC: 4.3 G/DL (ref 3.7–5.1)
ALPHA1 GLOB SERPL ELPH-MCNC: 0.4 G/DL (ref 0.2–0.4)
ALPHA2 GLOB SERPL ELPH-MCNC: 0.8 G/DL (ref 0.5–0.9)
B-GLOBULIN SERPL ELPH-MCNC: 0.7 G/DL (ref 0.6–1)
B2 MICROGLOB TUMOR MARKER SER-MCNC: 3.1 MG/L
GAMMA GLOB SERPL ELPH-MCNC: 0.7 G/DL (ref 0.7–1.6)
IGA SERPL-MCNC: 11 MG/DL (ref 84–499)
IGG SERPL-MCNC: 677 MG/DL (ref 610–1616)
IGM SERPL-MCNC: 11 MG/DL (ref 35–242)
KAPPA LC FREE SER-MCNC: 0.48 MG/DL (ref 0.33–1.94)
KAPPA LC FREE/LAMBDA FREE SER NEPH: 1.71 {RATIO} (ref 0.26–1.65)
LAMBDA LC FREE SERPL-MCNC: 0.28 MG/DL (ref 0.57–2.63)
M PROTEIN SERPL ELPH-MCNC: 0.4 G/DL
PROT PATTERN SERPL ELPH-IMP: ABNORMAL
PROT PATTERN SERPL IFE-IMP: NORMAL
VISC SER: 1.5 CP (ref 1.4–1.8)

## 2022-03-17 PROCEDURE — 84165 PROTEIN E-PHORESIS SERUM: CPT | Mod: 26 | Performed by: PATHOLOGY

## 2022-03-17 PROCEDURE — 86334 IMMUNOFIX E-PHORESIS SERUM: CPT | Performed by: PATHOLOGY

## 2022-03-22 ENCOUNTER — APPOINTMENT (OUTPATIENT)
Dept: LAB | Facility: OTHER | Age: 72
End: 2022-03-22
Attending: NURSE PRACTITIONER
Payer: MEDICARE

## 2022-03-22 ENCOUNTER — ONCOLOGY VISIT (OUTPATIENT)
Dept: ONCOLOGY | Facility: OTHER | Age: 72
End: 2022-03-22
Attending: NURSE PRACTITIONER
Payer: MEDICARE

## 2022-03-22 ENCOUNTER — INFUSION THERAPY VISIT (OUTPATIENT)
Dept: INFUSION THERAPY | Facility: OTHER | Age: 72
End: 2022-03-22
Attending: INTERNAL MEDICINE
Payer: MEDICARE

## 2022-03-22 VITALS
RESPIRATION RATE: 19 BRPM | OXYGEN SATURATION: 98 % | SYSTOLIC BLOOD PRESSURE: 122 MMHG | WEIGHT: 166.67 LBS | HEIGHT: 63 IN | DIASTOLIC BLOOD PRESSURE: 74 MMHG | TEMPERATURE: 98.6 F | HEART RATE: 92 BPM | BODY MASS INDEX: 29.53 KG/M2

## 2022-03-22 DIAGNOSIS — C90.00 MULTIPLE MYELOMA NOT HAVING ACHIEVED REMISSION (H): Primary | ICD-10-CM

## 2022-03-22 DIAGNOSIS — F33.0 MAJOR DEPRESSIVE DISORDER, RECURRENT EPISODE, MILD (H): ICD-10-CM

## 2022-03-22 LAB
ALBUMIN SERPL-MCNC: 3.4 G/DL (ref 3.4–5)
ALP SERPL-CCNC: 117 U/L (ref 40–150)
ALT SERPL W P-5'-P-CCNC: 21 U/L (ref 0–50)
ANION GAP SERPL CALCULATED.3IONS-SCNC: 7 MMOL/L (ref 3–14)
AST SERPL W P-5'-P-CCNC: 17 U/L (ref 0–45)
BASOPHILS # BLD AUTO: 0 10E3/UL (ref 0–0.2)
BASOPHILS NFR BLD AUTO: 0 %
BILIRUB SERPL-MCNC: 0.4 MG/DL (ref 0.2–1.3)
BUN SERPL-MCNC: 18 MG/DL (ref 7–30)
CALCIUM SERPL-MCNC: 9 MG/DL (ref 8.5–10.1)
CHLORIDE BLD-SCNC: 102 MMOL/L (ref 94–109)
CO2 SERPL-SCNC: 26 MMOL/L (ref 20–32)
CREAT SERPL-MCNC: 0.7 MG/DL (ref 0.52–1.04)
EOSINOPHIL # BLD AUTO: 0.1 10E3/UL (ref 0–0.7)
EOSINOPHIL NFR BLD AUTO: 5 %
ERYTHROCYTE [DISTWIDTH] IN BLOOD BY AUTOMATED COUNT: 13.8 % (ref 10–15)
GFR SERPL CREATININE-BSD FRML MDRD: >90 ML/MIN/1.73M2
GLUCOSE BLD-MCNC: 131 MG/DL (ref 70–99)
HCT VFR BLD AUTO: 39.6 % (ref 35–47)
HGB BLD-MCNC: 13.3 G/DL (ref 11.7–15.7)
IMM GRANULOCYTES # BLD: 0.1 10E3/UL
IMM GRANULOCYTES NFR BLD: 2 %
LYMPHOCYTES # BLD AUTO: 0.5 10E3/UL (ref 0.8–5.3)
LYMPHOCYTES NFR BLD AUTO: 16 %
MCH RBC QN AUTO: 30.9 PG (ref 26.5–33)
MCHC RBC AUTO-ENTMCNC: 33.6 G/DL (ref 31.5–36.5)
MCV RBC AUTO: 92 FL (ref 78–100)
MONOCYTES # BLD AUTO: 0.3 10E3/UL (ref 0–1.3)
MONOCYTES NFR BLD AUTO: 9 %
NEUTROPHILS # BLD AUTO: 2.1 10E3/UL (ref 1.6–8.3)
NEUTROPHILS NFR BLD AUTO: 68 %
NRBC # BLD AUTO: 0 10E3/UL
NRBC BLD AUTO-RTO: 0 /100
PLATELET # BLD AUTO: 212 10E3/UL (ref 150–450)
POTASSIUM BLD-SCNC: 3.7 MMOL/L (ref 3.4–5.3)
PROT SERPL-MCNC: 6.9 G/DL (ref 6.8–8.8)
RBC # BLD AUTO: 4.3 10E6/UL (ref 3.8–5.2)
SODIUM SERPL-SCNC: 135 MMOL/L (ref 133–144)
WBC # BLD AUTO: 3 10E3/UL (ref 4–11)

## 2022-03-22 PROCEDURE — 96401 CHEMO ANTI-NEOPL SQ/IM: CPT

## 2022-03-22 PROCEDURE — 80053 COMPREHEN METABOLIC PANEL: CPT | Mod: ZL | Performed by: NURSE PRACTITIONER

## 2022-03-22 PROCEDURE — G0463 HOSPITAL OUTPT CLINIC VISIT: HCPCS | Mod: 25

## 2022-03-22 PROCEDURE — 250N000011 HC RX IP 250 OP 636: Performed by: NURSE PRACTITIONER

## 2022-03-22 PROCEDURE — 82040 ASSAY OF SERUM ALBUMIN: CPT | Mod: ZL | Performed by: NURSE PRACTITIONER

## 2022-03-22 PROCEDURE — 99215 OFFICE O/P EST HI 40 MIN: CPT | Performed by: NURSE PRACTITIONER

## 2022-03-22 PROCEDURE — G0463 HOSPITAL OUTPT CLINIC VISIT: HCPCS

## 2022-03-22 PROCEDURE — 36415 COLL VENOUS BLD VENIPUNCTURE: CPT | Mod: ZL | Performed by: NURSE PRACTITIONER

## 2022-03-22 PROCEDURE — 250N000013 HC RX MED GY IP 250 OP 250 PS 637: Performed by: NURSE PRACTITIONER

## 2022-03-22 PROCEDURE — 85025 COMPLETE CBC W/AUTO DIFF WBC: CPT | Mod: ZL | Performed by: NURSE PRACTITIONER

## 2022-03-22 RX ORDER — ALBUTEROL SULFATE 0.83 MG/ML
2.5 SOLUTION RESPIRATORY (INHALATION)
Status: CANCELLED | OUTPATIENT
Start: 2022-03-29

## 2022-03-22 RX ORDER — EPINEPHRINE 1 MG/ML
0.3 INJECTION, SOLUTION, CONCENTRATE INTRAVENOUS EVERY 5 MIN PRN
Status: CANCELLED | OUTPATIENT
Start: 2022-04-01

## 2022-03-22 RX ORDER — DIPHENHYDRAMINE HYDROCHLORIDE 50 MG/ML
50 INJECTION INTRAMUSCULAR; INTRAVENOUS
Status: CANCELLED
Start: 2022-03-29

## 2022-03-22 RX ORDER — HEPARIN SODIUM (PORCINE) LOCK FLUSH IV SOLN 100 UNIT/ML 100 UNIT/ML
5 SOLUTION INTRAVENOUS
Status: CANCELLED | OUTPATIENT
Start: 2022-03-22

## 2022-03-22 RX ORDER — NALOXONE HYDROCHLORIDE 0.4 MG/ML
.1-.4 INJECTION, SOLUTION INTRAMUSCULAR; INTRAVENOUS; SUBCUTANEOUS
Status: CANCELLED | OUTPATIENT
Start: 2022-03-24

## 2022-03-22 RX ORDER — ACETAMINOPHEN 325 MG/1
650 TABLET ORAL ONCE
Status: COMPLETED | OUTPATIENT
Start: 2022-03-22 | End: 2022-03-22

## 2022-03-22 RX ORDER — LORAZEPAM 2 MG/ML
0.5 INJECTION INTRAMUSCULAR EVERY 4 HOURS PRN
Status: CANCELLED | OUTPATIENT
Start: 2022-03-22

## 2022-03-22 RX ORDER — ALBUTEROL SULFATE 90 UG/1
1-2 AEROSOL, METERED RESPIRATORY (INHALATION)
Status: CANCELLED
Start: 2022-03-22

## 2022-03-22 RX ORDER — SODIUM CHLORIDE 9 MG/ML
1000 INJECTION, SOLUTION INTRAVENOUS CONTINUOUS PRN
Status: CANCELLED
Start: 2022-03-24

## 2022-03-22 RX ORDER — ALBUTEROL SULFATE 0.83 MG/ML
2.5 SOLUTION RESPIRATORY (INHALATION)
Status: CANCELLED | OUTPATIENT
Start: 2022-03-22

## 2022-03-22 RX ORDER — MEPERIDINE HYDROCHLORIDE 25 MG/ML
25 INJECTION INTRAMUSCULAR; INTRAVENOUS; SUBCUTANEOUS EVERY 30 MIN PRN
Status: CANCELLED | OUTPATIENT
Start: 2022-03-24

## 2022-03-22 RX ORDER — DIPHENHYDRAMINE HYDROCHLORIDE 50 MG/ML
50 INJECTION INTRAMUSCULAR; INTRAVENOUS
Status: CANCELLED
Start: 2022-03-22

## 2022-03-22 RX ORDER — LORAZEPAM 2 MG/ML
0.5 INJECTION INTRAMUSCULAR EVERY 4 HOURS PRN
Status: CANCELLED | OUTPATIENT
Start: 2022-03-29

## 2022-03-22 RX ORDER — ACETAMINOPHEN 325 MG/1
650 TABLET ORAL ONCE
Status: CANCELLED | OUTPATIENT
Start: 2022-03-22

## 2022-03-22 RX ORDER — METHYLPREDNISOLONE SODIUM SUCCINATE 125 MG/2ML
125 INJECTION, POWDER, LYOPHILIZED, FOR SOLUTION INTRAMUSCULAR; INTRAVENOUS
Status: CANCELLED
Start: 2022-04-01

## 2022-03-22 RX ORDER — MEPERIDINE HYDROCHLORIDE 25 MG/ML
25 INJECTION INTRAMUSCULAR; INTRAVENOUS; SUBCUTANEOUS EVERY 30 MIN PRN
Status: CANCELLED | OUTPATIENT
Start: 2022-03-22

## 2022-03-22 RX ORDER — MEPERIDINE HYDROCHLORIDE 25 MG/ML
25 INJECTION INTRAMUSCULAR; INTRAVENOUS; SUBCUTANEOUS EVERY 30 MIN PRN
Status: CANCELLED | OUTPATIENT
Start: 2022-03-29

## 2022-03-22 RX ORDER — DIPHENHYDRAMINE HCL 50 MG
50 CAPSULE ORAL ONCE
Status: COMPLETED | OUTPATIENT
Start: 2022-03-22 | End: 2022-03-22

## 2022-03-22 RX ORDER — DIPHENHYDRAMINE HCL 50 MG
50 CAPSULE ORAL ONCE
Status: CANCELLED | OUTPATIENT
Start: 2022-03-22

## 2022-03-22 RX ORDER — ALBUTEROL SULFATE 90 UG/1
1-2 AEROSOL, METERED RESPIRATORY (INHALATION)
Status: CANCELLED
Start: 2022-03-24

## 2022-03-22 RX ORDER — ALBUTEROL SULFATE 90 UG/1
1-2 AEROSOL, METERED RESPIRATORY (INHALATION)
Status: CANCELLED
Start: 2022-03-29

## 2022-03-22 RX ORDER — HEPARIN SODIUM,PORCINE 10 UNIT/ML
5 VIAL (ML) INTRAVENOUS
Status: CANCELLED | OUTPATIENT
Start: 2022-03-22

## 2022-03-22 RX ORDER — MEPERIDINE HYDROCHLORIDE 25 MG/ML
25 INJECTION INTRAMUSCULAR; INTRAVENOUS; SUBCUTANEOUS EVERY 30 MIN PRN
Status: CANCELLED | OUTPATIENT
Start: 2022-04-01

## 2022-03-22 RX ORDER — NALOXONE HYDROCHLORIDE 0.4 MG/ML
.1-.4 INJECTION, SOLUTION INTRAMUSCULAR; INTRAVENOUS; SUBCUTANEOUS
Status: CANCELLED | OUTPATIENT
Start: 2022-04-01

## 2022-03-22 RX ORDER — LORAZEPAM 2 MG/ML
0.5 INJECTION INTRAMUSCULAR EVERY 4 HOURS PRN
Status: CANCELLED | OUTPATIENT
Start: 2022-03-24

## 2022-03-22 RX ORDER — HEPARIN SODIUM (PORCINE) LOCK FLUSH IV SOLN 100 UNIT/ML 100 UNIT/ML
5 SOLUTION INTRAVENOUS
Status: CANCELLED | OUTPATIENT
Start: 2022-03-29

## 2022-03-22 RX ORDER — ALBUTEROL SULFATE 0.83 MG/ML
2.5 SOLUTION RESPIRATORY (INHALATION)
Status: CANCELLED | OUTPATIENT
Start: 2022-03-24

## 2022-03-22 RX ORDER — HEPARIN SODIUM (PORCINE) LOCK FLUSH IV SOLN 100 UNIT/ML 100 UNIT/ML
5 SOLUTION INTRAVENOUS
Status: CANCELLED | OUTPATIENT
Start: 2022-04-01

## 2022-03-22 RX ORDER — METHYLPREDNISOLONE SODIUM SUCCINATE 125 MG/2ML
125 INJECTION, POWDER, LYOPHILIZED, FOR SOLUTION INTRAMUSCULAR; INTRAVENOUS
Status: CANCELLED
Start: 2022-03-24

## 2022-03-22 RX ORDER — ALBUTEROL SULFATE 0.83 MG/ML
2.5 SOLUTION RESPIRATORY (INHALATION)
Status: CANCELLED | OUTPATIENT
Start: 2022-04-01

## 2022-03-22 RX ORDER — LORAZEPAM 2 MG/ML
0.5 INJECTION INTRAMUSCULAR EVERY 4 HOURS PRN
Status: CANCELLED | OUTPATIENT
Start: 2022-04-01

## 2022-03-22 RX ORDER — SODIUM CHLORIDE 9 MG/ML
1000 INJECTION, SOLUTION INTRAVENOUS CONTINUOUS PRN
Status: CANCELLED
Start: 2022-04-01

## 2022-03-22 RX ORDER — DIPHENHYDRAMINE HYDROCHLORIDE 50 MG/ML
50 INJECTION INTRAMUSCULAR; INTRAVENOUS
Status: CANCELLED
Start: 2022-03-24

## 2022-03-22 RX ORDER — SODIUM CHLORIDE 9 MG/ML
1000 INJECTION, SOLUTION INTRAVENOUS CONTINUOUS PRN
Status: CANCELLED
Start: 2022-03-22

## 2022-03-22 RX ORDER — SODIUM CHLORIDE 9 MG/ML
1000 INJECTION, SOLUTION INTRAVENOUS CONTINUOUS PRN
Status: CANCELLED
Start: 2022-03-29

## 2022-03-22 RX ORDER — DIPHENHYDRAMINE HYDROCHLORIDE 50 MG/ML
50 INJECTION INTRAMUSCULAR; INTRAVENOUS
Status: CANCELLED
Start: 2022-04-01

## 2022-03-22 RX ORDER — HEPARIN SODIUM (PORCINE) LOCK FLUSH IV SOLN 100 UNIT/ML 100 UNIT/ML
5 SOLUTION INTRAVENOUS
Status: CANCELLED | OUTPATIENT
Start: 2022-03-24

## 2022-03-22 RX ORDER — HEPARIN SODIUM,PORCINE 10 UNIT/ML
5 VIAL (ML) INTRAVENOUS
Status: CANCELLED | OUTPATIENT
Start: 2022-03-29

## 2022-03-22 RX ORDER — METHYLPREDNISOLONE SODIUM SUCCINATE 125 MG/2ML
125 INJECTION, POWDER, LYOPHILIZED, FOR SOLUTION INTRAMUSCULAR; INTRAVENOUS
Status: CANCELLED
Start: 2022-03-29

## 2022-03-22 RX ORDER — NALOXONE HYDROCHLORIDE 0.4 MG/ML
.1-.4 INJECTION, SOLUTION INTRAMUSCULAR; INTRAVENOUS; SUBCUTANEOUS
Status: CANCELLED | OUTPATIENT
Start: 2022-03-29

## 2022-03-22 RX ORDER — HEPARIN SODIUM,PORCINE 10 UNIT/ML
5 VIAL (ML) INTRAVENOUS
Status: CANCELLED | OUTPATIENT
Start: 2022-04-01

## 2022-03-22 RX ORDER — EPINEPHRINE 1 MG/ML
0.3 INJECTION, SOLUTION, CONCENTRATE INTRAVENOUS EVERY 5 MIN PRN
Status: CANCELLED | OUTPATIENT
Start: 2022-03-22

## 2022-03-22 RX ORDER — HEPARIN SODIUM,PORCINE 10 UNIT/ML
5 VIAL (ML) INTRAVENOUS
Status: CANCELLED | OUTPATIENT
Start: 2022-03-24

## 2022-03-22 RX ORDER — ALBUTEROL SULFATE 90 UG/1
1-2 AEROSOL, METERED RESPIRATORY (INHALATION)
Status: CANCELLED
Start: 2022-04-01

## 2022-03-22 RX ORDER — METHYLPREDNISOLONE SODIUM SUCCINATE 125 MG/2ML
125 INJECTION, POWDER, LYOPHILIZED, FOR SOLUTION INTRAMUSCULAR; INTRAVENOUS
Status: CANCELLED
Start: 2022-03-22

## 2022-03-22 RX ORDER — EPINEPHRINE 1 MG/ML
0.3 INJECTION, SOLUTION, CONCENTRATE INTRAVENOUS EVERY 5 MIN PRN
Status: CANCELLED | OUTPATIENT
Start: 2022-03-24

## 2022-03-22 RX ORDER — NALOXONE HYDROCHLORIDE 0.4 MG/ML
.1-.4 INJECTION, SOLUTION INTRAMUSCULAR; INTRAVENOUS; SUBCUTANEOUS
Status: CANCELLED | OUTPATIENT
Start: 2022-03-22

## 2022-03-22 RX ORDER — EPINEPHRINE 1 MG/ML
0.3 INJECTION, SOLUTION, CONCENTRATE INTRAVENOUS EVERY 5 MIN PRN
Status: CANCELLED | OUTPATIENT
Start: 2022-03-29

## 2022-03-22 RX ADMIN — ACETAMINOPHEN 650 MG: 325 TABLET, FILM COATED ORAL at 13:51

## 2022-03-22 RX ADMIN — DIPHENHYDRAMINE HCL 50 MG: 50 CAPSULE ORAL at 13:51

## 2022-03-22 RX ADMIN — DARATUMUMAB AND HYALURONIDASE-FIHJ (HUMAN RECOMBINANT) 1800 MG: 1800; 30000 INJECTION SUBCUTANEOUS at 14:22

## 2022-03-22 RX ADMIN — BORTEZOMIB 2.4 MG: 3.5 INJECTION, POWDER, LYOPHILIZED, FOR SOLUTION INTRAVENOUS; SUBCUTANEOUS at 14:23

## 2022-03-22 ASSESSMENT — PAIN SCALES - GENERAL: PAINLEVEL: NO PAIN (0)

## 2022-03-22 NOTE — NURSING NOTE
"Oncology Rooming Note    March 22, 2022 12:50 PM   Carmelita Watts is a 71 year old female who presents for:    Chief Complaint   Patient presents with     Oncology Clinic Visit     Follow up. Multiple myeloma not having achieved remission      Initial Vitals: /74   Pulse 92   Temp 98.6  F (37  C) (Tympanic)   Resp 19   Ht 1.6 m (5' 3\")   Wt 75.6 kg (166 lb 10.7 oz)   SpO2 98%   BMI 29.52 kg/m   Estimated body mass index is 29.52 kg/m  as calculated from the following:    Height as of this encounter: 1.6 m (5' 3\").    Weight as of this encounter: 75.6 kg (166 lb 10.7 oz). Body surface area is 1.83 meters squared.  No Pain (0) Comment: Data Unavailable   No LMP recorded. Patient is postmenopausal.  Allergies reviewed: Yes  Medications reviewed: Yes    Medications: Medication refills not needed today.  Pharmacy name entered into [facility]:    [id number] - [place], [place] - [address]  AT [facility] [address]  [facility] - [place], [place] - [address]  [facility] - [place] - [address]              "

## 2022-03-22 NOTE — PATIENT INSTRUCTIONS
Thank you for allowing me to be a part of your care today.    Follow-up as per schedule.     If you have any questions please call [phone]    Other instructions:      None

## 2022-03-22 NOTE — PROGRESS NOTES
Patient is 71 years old, here today for injection of velcade/FASPRO per order of and under the supervision of Dr. Walker.     Oncology toxicity assessment, fall risk, abuse screening, medication/allergy reconciliation completed during provider visit today.  Reviewed by this writer.     Lab values:    Component      Latest Ref Rng & Units 3/22/2022   WBC      4.0 - 11.0 10e3/uL 3.0 (L)   RBC Count      3.80 - 5.20 10e6/uL 4.30   Hemoglobin      11.7 - 15.7 g/dL 13.3   Hematocrit      35.0 - 47.0 % 39.6   MCV      78 - 100 fL 92   MCH      26.5 - 33.0 pg 30.9   MCHC      31.5 - 36.5 g/dL 33.6   RDW      10.0 - 15.0 % 13.8   Platelet Count      150 - 450 10e3/uL 212   % Neutrophils      % 68   % Lymphocytes      % 16   % Monocytes      % 9   % Eosinophils      % 5   % Basophils      % 0   % Immature Granulocytes      % 2   NRBCs per 100 WBC      <1 /100 0   Absolute Neutrophils      1.6 - 8.3 10e3/uL 2.1   Absolute Lymphocytes      0.8 - 5.3 10e3/uL 0.5 (L)   Absolute Monocytes      0.0 - 1.3 10e3/uL 0.3   Absolute Eosinophils      0.0 - 0.7 10e3/uL 0.1   Absolute Basophils      0.0 - 0.2 10e3/uL 0.0   Absolute Immature Granulocytes      <=0.4 10e3/uL 0.1   Absolute NRBCs      10e3/uL 0.0   Sodium      133 - 144 mmol/L 135   Potassium      3.4 - 5.3 mmol/L 3.7   Chloride      94 - 109 mmol/L 102   Carbon Dioxide      20 - 32 mmol/L 26   Anion Gap      3 - 14 mmol/L 7   Urea Nitrogen      7 - 30 mg/dL 18   Creatinine      0.52 - 1.04 mg/dL 0.70   Calcium      8.5 - 10.1 mg/dL 9.0   Glucose      70 - 99 mg/dL 131 (H)   Alkaline Phosphatase      40 - 150 U/L 117   AST      0 - 45 U/L 17   ALT      0 - 50 U/L 21   Protein Total      6.8 - 8.8 g/dL 6.9   Albumin      3.4 - 5.0 g/dL 3.4   Bilirubin Total      0.2 - 1.3 mg/dL 0.4   GFR Estimate      >60 mL/min/1.73m2 >90       Labs meet parameters for today's injection.     Independent dose check completed with Anne EARL RN.      Patient denies questions nor concerns  regarding medications, administration sites, side effects, nor aftercare.  Patient identified with two identifiers, order verified, and verbal consent for today's injections obtained from patient.   Patient education provided on s/s of injection site infection, and/or medication specific side effects, and when to call a provider.  Patient instructed to report any adverse effects.       Velcade administered per protocol in back of upper left arm at 45 degree degree angle per protocol rotating sites.  Site covered with sterile bandage.  Patient tolerated injection well, no verbal nor non-verbal signs of discomfort noted.  No adverse effects noted at this time.     FASPRO administered per protocol in right upper quadrant of abdomen at 45 degree degree angle per protocol rotating sites.  Site covered with sterile bandage.  Patient tolerated injection well, no verbal nor non-verbal signs of discomfort noted.  No adverse effects noted at this time.     Patient instructed to call with further questions or concerns.  Patient states understanding and is in agreement with this plan.  Patient discharged.

## 2022-03-25 ENCOUNTER — INFUSION THERAPY VISIT (OUTPATIENT)
Dept: INFUSION THERAPY | Facility: OTHER | Age: 72
End: 2022-03-25
Attending: INTERNAL MEDICINE
Payer: MEDICARE

## 2022-03-25 VITALS — WEIGHT: 165.34 LBS | BODY MASS INDEX: 29.29 KG/M2

## 2022-03-25 DIAGNOSIS — C90.00 MULTIPLE MYELOMA NOT HAVING ACHIEVED REMISSION (H): Primary | ICD-10-CM

## 2022-03-25 PROCEDURE — 250N000011 HC RX IP 250 OP 636: Mod: JW | Performed by: NURSE PRACTITIONER

## 2022-03-25 PROCEDURE — 96401 CHEMO ANTI-NEOPL SQ/IM: CPT

## 2022-03-25 RX ADMIN — BORTEZOMIB 2.4 MG: 3.5 INJECTION, POWDER, LYOPHILIZED, FOR SOLUTION INTRAVENOUS; SUBCUTANEOUS at 13:20

## 2022-03-25 NOTE — PROGRESS NOTES
Patient is a 71  her today for injection of Velcade per order of . Patient identified with two identifiers, order verified, and verbal consent for today's infusion obtained from patient. Written consent for treatment is on file and valid.    Recent lab values:  Labs not required for today's infusion.    Patient meets order parameters for today's treatment.    Velcade dose verified with Julia ADEN RN, prior to release of drug.      Patient denies questions or concerns regarding injection and/or medication(s) being administered.    Velcade injected SQ into right arm at 45 degree angle per protocol rotating sites. Patient tolerated injection without incident, no signs or symptoms of adverse reaction noted. Patient denies pain or discomfort.     Covered with a sterile bandage. Pt instructed to leave bandage intact for a minimum of one hour, and to call with questions or concerns. Copy of appointments, discharge instructions, and after visit summary (AVS) provided to patient. Patient states understanding, discharged.

## 2022-03-25 NOTE — PROGRESS NOTES
Patient reports no changes in her condition from her last assessment on Tuesday.  Patient reports she is still battling a cold but the provider is aware.  Patient discussed taking a possible holiday from her velcade and provider is looking into this .  Patient is comfortable with being treated today.      Addendum:  Patient later spoke to me at end of appointment stating she feels like she is declining she is wondering about anything that could be done to boost her up, feeling weaker in different areas.  Patient is taking a multivitamin and calcium with D.  Patient feels defeated.  Would really like to take a holiday from the Velcade.  I reminded patient that she is her own advocate and that she has the right to express her concerns and feel validated.  Provider will look into drug holiday but will also advise patient of what they thing is in her best health.  Patient has the right to disagree or agree and make her own choices.  This writer did update provider and confirmed that patient and her will discuss this next month at her next visit.  As something to boost her up the best thing for her would be good nutrition and exercise.  Patient and provider did speak about depression and grief at last visit and provider is aware of patient current status.  Patient confirms she is taking antidepressant.

## 2022-03-29 ENCOUNTER — INFUSION THERAPY VISIT (OUTPATIENT)
Dept: INFUSION THERAPY | Facility: OTHER | Age: 72
End: 2022-03-29
Attending: INTERNAL MEDICINE
Payer: MEDICARE

## 2022-03-29 ENCOUNTER — ALLIED HEALTH/NURSE VISIT (OUTPATIENT)
Dept: AUDIOLOGY | Facility: OTHER | Age: 72
End: 2022-03-29
Attending: AUDIOLOGIST
Payer: MEDICARE

## 2022-03-29 ENCOUNTER — OFFICE VISIT (OUTPATIENT)
Dept: AUDIOLOGY | Facility: OTHER | Age: 72
End: 2022-03-29
Attending: AUDIOLOGIST
Payer: MEDICARE

## 2022-03-29 VITALS — WEIGHT: 165.34 LBS | BODY MASS INDEX: 29.29 KG/M2

## 2022-03-29 DIAGNOSIS — H90.A32 MIXED CONDUCTIVE AND SENSORINEURAL HEARING LOSS OF LEFT EAR WITH RESTRICTED HEARING OF RIGHT EAR: Primary | ICD-10-CM

## 2022-03-29 DIAGNOSIS — H69.92 DYSFUNCTION OF LEFT EUSTACHIAN TUBE: ICD-10-CM

## 2022-03-29 DIAGNOSIS — H91.93 DECREASED HEARING OF BOTH EARS: ICD-10-CM

## 2022-03-29 DIAGNOSIS — H90.A21 SENSORINEURAL HEARING LOSS (SNHL) OF RIGHT EAR WITH RESTRICTED HEARING OF LEFT EAR: ICD-10-CM

## 2022-03-29 DIAGNOSIS — C90.00 MULTIPLE MYELOMA NOT HAVING ACHIEVED REMISSION (H): Primary | ICD-10-CM

## 2022-03-29 DIAGNOSIS — H90.6 MIXED CONDUCTIVE AND SENSORINEURAL HEARING LOSS OF BOTH EARS: Primary | ICD-10-CM

## 2022-03-29 LAB
BASOPHILS # BLD MANUAL: 0 10E3/UL (ref 0–0.2)
BASOPHILS NFR BLD MANUAL: 0 %
EOSINOPHIL # BLD MANUAL: 0 10E3/UL (ref 0–0.7)
EOSINOPHIL NFR BLD MANUAL: 1 %
ERYTHROCYTE [DISTWIDTH] IN BLOOD BY AUTOMATED COUNT: 13.4 % (ref 10–15)
HCT VFR BLD AUTO: 37.8 % (ref 35–47)
HGB BLD-MCNC: 13 G/DL (ref 11.7–15.7)
LYMPHOCYTES # BLD MANUAL: 0.7 10E3/UL (ref 0.8–5.3)
LYMPHOCYTES NFR BLD MANUAL: 18 %
MCH RBC QN AUTO: 31.1 PG (ref 26.5–33)
MCHC RBC AUTO-ENTMCNC: 34.4 G/DL (ref 31.5–36.5)
MCV RBC AUTO: 90 FL (ref 78–100)
METAMYELOCYTES # BLD MANUAL: 0.1 10E3/UL
METAMYELOCYTES NFR BLD MANUAL: 2 %
MONOCYTES # BLD MANUAL: 0.1 10E3/UL (ref 0–1.3)
MONOCYTES NFR BLD MANUAL: 3 %
NEUTROPHILS # BLD MANUAL: 2.8 10E3/UL (ref 1.6–8.3)
NEUTROPHILS NFR BLD MANUAL: 76 %
NRBC # BLD AUTO: 0 10E3/UL
NRBC BLD MANUAL-RTO: 1 %
PLAT MORPH BLD: ABNORMAL
PLATELET # BLD AUTO: 180 10E3/UL (ref 150–450)
RBC # BLD AUTO: 4.18 10E6/UL (ref 3.8–5.2)
RBC MORPH BLD: ABNORMAL
WBC # BLD AUTO: 3.7 10E3/UL (ref 4–11)

## 2022-03-29 PROCEDURE — 92567 TYMPANOMETRY: CPT | Performed by: AUDIOLOGIST

## 2022-03-29 PROCEDURE — 96401 CHEMO ANTI-NEOPL SQ/IM: CPT

## 2022-03-29 PROCEDURE — 85027 COMPLETE CBC AUTOMATED: CPT | Mod: ZL | Performed by: NURSE PRACTITIONER

## 2022-03-29 PROCEDURE — 250N000011 HC RX IP 250 OP 636: Mod: JW | Performed by: NURSE PRACTITIONER

## 2022-03-29 PROCEDURE — V5299 HEARING SERVICE: HCPCS | Performed by: AUDIOLOGIST

## 2022-03-29 PROCEDURE — 36415 COLL VENOUS BLD VENIPUNCTURE: CPT | Mod: ZL | Performed by: NURSE PRACTITIONER

## 2022-03-29 PROCEDURE — 92557 COMPREHENSIVE HEARING TEST: CPT | Performed by: AUDIOLOGIST

## 2022-03-29 RX ORDER — CETIRIZINE HYDROCHLORIDE 10 MG/1
10 TABLET ORAL DAILY
COMMUNITY
End: 2022-10-18

## 2022-03-29 RX ADMIN — BORTEZOMIB 2.4 MG: 3.5 INJECTION, POWDER, LYOPHILIZED, FOR SOLUTION INTRAVENOUS; SUBCUTANEOUS at 12:45

## 2022-03-29 NOTE — PROGRESS NOTES
Background: Patient seen for hearing aid check.    Results: Patient concerns-Retention hooks misplaced.  New put on today for better retention along with new filters and domes.     Good listening check and visual inspection.    Recommendations: 12 month follow up sooner if changes.    Bia Ortega M.S., [facility]-A  Audiologist #[id number]

## 2022-03-29 NOTE — PROGRESS NOTES
Patient is a 71 year old female her today for injection of Velcade per order of . Patient identified with two identifiers, order verified, and verbal consent for today's infusion obtained from patient. Written consent for treatment is on file and valid.    Recent lab values: reviewed and are with in parameter orders Patient meets order parameters for today's treatment.    Velcade dose verified with Barron RICH RN, prior to release of drug.      Patient denies questions or concerns regarding injection and/or medication(s) being administered.    Velcade injected SQ into left back arm  at 45 degree angle per protocol rotating sites. Patient tolerated injection without incident, no signs or symptoms of adverse reaction noted. Patient denies pain or discomfort.     Covered with a sterile bandage. Pt instructed to leave bandage intact for a minimum of one hour, and to call with questions or concerns. Copy of appointments, discharge instructions, and after visit summary (AVS) provided to patient. Patient states understanding, discharged.

## 2022-03-29 NOTE — PROGRESS NOTES
Audiology Evaluation Completed. Please refer SCANNED AUDIOGRAM and/or TYMPANOGRAM for BACKGROUND, RESULTS, RECOMMENDATIONS.      Bia BULLOCK, [facility]-A  Audiologist #[id number]

## 2022-03-29 NOTE — PROGRESS NOTES
Patient reports she has discussed with provider medication holiday.  Patient feels this will help with her mental health.  Biggest concern right now is she is having allergies.  Is taking a 10 mg zyrtec daily, thinks she may increase as needed.  Needed to to take Mucinex due to some chest congestion with mucus, will take for a couple more days.  No other health concerns and verbally agrees to treatment.

## 2022-03-31 ENCOUNTER — OFFICE VISIT (OUTPATIENT)
Dept: OTOLARYNGOLOGY | Facility: OTHER | Age: 72
End: 2022-03-31
Attending: PHYSICIAN ASSISTANT
Payer: MEDICARE

## 2022-03-31 VITALS
HEIGHT: 63 IN | SYSTOLIC BLOOD PRESSURE: 138 MMHG | HEART RATE: 81 BPM | WEIGHT: 160 LBS | TEMPERATURE: 100.1 F | DIASTOLIC BLOOD PRESSURE: 80 MMHG | BODY MASS INDEX: 28.35 KG/M2 | OXYGEN SATURATION: 97 %

## 2022-03-31 DIAGNOSIS — H72.92 PERFORATION OF TYMPANIC MEMBRANE, LEFT: ICD-10-CM

## 2022-03-31 DIAGNOSIS — R50.9 FEVER, UNSPECIFIED FEVER CAUSE: ICD-10-CM

## 2022-03-31 DIAGNOSIS — R05.9 COUGH: Primary | ICD-10-CM

## 2022-03-31 DIAGNOSIS — C90.00 MULTIPLE MYELOMA NOT HAVING ACHIEVED REMISSION (H): ICD-10-CM

## 2022-03-31 DIAGNOSIS — J06.9 UPPER RESPIRATORY TRACT INFECTION, UNSPECIFIED TYPE: ICD-10-CM

## 2022-03-31 LAB
FLUAV RNA SPEC QL NAA+PROBE: NEGATIVE
FLUBV RNA RESP QL NAA+PROBE: NEGATIVE
RSV RNA SPEC NAA+PROBE: NEGATIVE
SARS-COV-2 RNA RESP QL NAA+PROBE: NEGATIVE

## 2022-03-31 PROCEDURE — 99213 OFFICE O/P EST LOW 20 MIN: CPT | Mod: CS | Performed by: PHYSICIAN ASSISTANT

## 2022-03-31 PROCEDURE — 87637 SARSCOV2&INF A&B&RSV AMP PRB: CPT | Mod: ZL | Performed by: PHYSICIAN ASSISTANT

## 2022-03-31 PROCEDURE — G0463 HOSPITAL OUTPT CLINIC VISIT: HCPCS

## 2022-03-31 RX ORDER — AZITHROMYCIN 250 MG/1
TABLET, FILM COATED ORAL
Qty: 6 TABLET | Refills: 0 | Status: SHIPPED | OUTPATIENT
Start: 2022-03-31 | End: 2022-04-05

## 2022-03-31 ASSESSMENT — PAIN SCALES - GENERAL: PAINLEVEL: MILD PAIN (3)

## 2022-03-31 NOTE — PATIENT INSTRUCTIONS
Maintain good oral intake- fluids.   Start Azithromycin as directed.   Increase Cory med rinse to twice a day. (use at least 1 hour  Before bedtime).   Increase Flonase 2 sprays to each nostril 2 times daily for 1 week, then use daily.   Continue with Muccinex- may use twice a day.     Will call w/ results from swab testing.       Thank you for allowing Bridgette Ly PA-C and our ENT team to participate in your care.  If your medications are too expensive, please give the nurse a call.  We can possibly change this medication.  If you have a scheduling or an appointment question please contact our Health Unit Coordinator at [phone], Ext. [phone].    ALL nursing questions or concerns can be directed to your ENT nurse at: [phone] [place]

## 2022-03-31 NOTE — LETTER
3/31/2022         RE: Carmelita Watts  [address] [facility] [address]        Dear Colleague,    Thank you for referring your patient, Carmelita Watts, to the [facility]. Please see a copy of my visit note below.    Chief Complaint   Patient presents with     Otalgia     Ear pain       She has ongoing concerns for otalgia, coughing, URI.   Symptoms started about 1 week ago and have been worsening over the last 24 hours. She had increase in nasal congestion, facial pain, post nasal drainage, right otalgia.   She has low grade temps. Reports symptoms developed after cleaning garage. However, she does have multiple myeloma and has been on maintenance chemotherapy.   She did take Muccinex DM.       +Fatigue, decreased energy.   +Sore throat   +coughing with increase drainage.   No fever.   +Mild body aches, but reports related to chemo.   She has been drinking water well.   She has been eating, but decreased.     Patient has a history of otitis media with effusion with prior placement of tympanostomy tubes.  Right tube has been absent and right TM intact without effusion.  Left TM does have small perforation noted.  Past Medical History:   Diagnosis Date     Arthritis      Cyst of breast      Depressive disorder      Diabetes mellitus, type 2 (H) 1/18/2021     Essential hypertension 10/1/2015     Major depressive disorder, recurrent episode, mild (H) 10/1/2015     Mixed hyperlipidemia 10/1/2015     Multiple myeloma not having achieved remission (H) 6/24/2019     Other specified disorders of bladder 07/09/2012    Irritable Bladder     Seasonal allergies 10/1/2015     Unspecified essential hypertension 03/19/2007     Unspecified sinusitis (chronic) 09/05/2007        Allergies   Allergen Reactions     Lisinopril Cough     Phenylephrine Hcl Other (See Comments)     **Entex  HEADACHE (SEVERE)     Phenylpropanolamine Other (See Comments)     **Entex  HEADACHE (SEVERE)     Pseudoephedrine Tannate  "Other (See Comments)     **Entex  HEADACHE (SEVERE)     Levofloxacin Rash     **Levaquin     Moxifloxacin Hcl [Avelox] Rash     Current Outpatient Medications   Medication     acyclovir (ZOVIRAX) 400 MG tablet     aspirin (ASA) 81 MG chewable tablet     atorvastatin (LIPITOR) 10 MG tablet     cetirizine (ZYRTEC) 10 MG tablet     dexamethasone (DECADRON) 4 MG tablet     fluticasone (FLONASE) 50 MCG/ACT nasal spray     L-Lysine HCl 500 MG CAPS     losartan (COZAAR) 50 MG tablet     sertraline (ZOLOFT) 50 MG tablet     EPINEPHrine (EPIPEN) 0.3 MG/0.3ML injection     prochlorperazine (COMPAZINE) 10 MG tablet     No current facility-administered medications for this visit.     ROS- SEE HPI  /80 (BP Location: Right arm, Patient Position: Sitting, Cuff Size: Adult Regular)   Pulse 81   Temp 100.1  F (37.8  C) (Tympanic)   Ht 1.6 m (5' 3\")   Wt 72.6 kg (160 lb)   SpO2 97%   BMI 28.34 kg/m      General - The patient is well nourished and well developed, and appears to have good nutritional status.  Patient is coughing throughout visit.  No acute distress.   Head and Face - Normocephalic and atraumatic, with no gross asymmetry noted of the contour of the facial features.  The facial nerve is intact, with strong symmetric movements.  Eyes - Extraocular movements intact, and the pupils were reactive to light.  Sclera were not icteric or injected, conjunctiva were pink and moist.  Mouth - Examination of the oral cavity shows pink, healthy, moist mucosa.  No lesions or ulceration noted.  The dentition are in good repair.  The tongue is mobile and midline.  Ears - Examination of the ears showed- Right canal clear. Right TM is intact without effusion or perforation.  There is a small pocket of retraction.   Left TM is with perforation, central anterior about 15%. ME appears healthy and dry.   Eyes - Extraocular movements intact, and the pupils were reactive to light.  Sclera were not icteric or injected, conjunctiva " were pink and moist.  Mouth - Examination of the oral cavity showed pink, healthy oral mucosa. No lesions or ulcerations noted.  The tongue was mobile and midline, and the dentition were in good condition.    Throat - The walls of the oropharynx were smooth, pink, moist, symmetric, and had no lesions or ulcerations.  The tonsillar pillars and soft palate were symmetric.  The uvula was midline on elevation.    Neck - Normal midline excursion of the laryngotracheal complex during swallowing.  Full range of motion on passive movement.  Palpation of the occipital, submental, submandibular, internal jugular chain, and supraclavicular nodes did not demonstrate any abnormal lymph nodes or masses.  Palpation of the thyroid was soft and smooth, with no nodules or goiter appreciated.  The trachea was mobile and midline.  Nose-outer nose appears normal.  Septum appears midline.  Bilateral nasal turbinates appear with moderate hypertrophy.  Clear secretions throughout  Heart regular rate  Lungs-clear anterior and posterior.  No wheeze or rales noted.  Good air exchange         ASSESSMENT/ PLAN:      ICD-10-CM    1. Cough  R05.9 Symptomatic; Yes; 3/24/2022 Influenza A/B & SARS-CoV2 (COVID-19) Virus PCR Multiplex Nose     azithromycin (ZITHROMAX) 250 MG tablet   2. Fever, unspecified fever cause  R50.9 Symptomatic; Yes; 3/24/2022 Influenza A/B & SARS-CoV2 (COVID-19) Virus PCR Multiplex Nose     azithromycin (ZITHROMAX) 250 MG tablet   3. Upper respiratory tract infection, unspecified type  J06.9 azithromycin (ZITHROMAX) 250 MG tablet   4. Perforation of tympanic membrane, left  H72.92    5. Multiple myeloma not having achieved remission (H)  C90.00         COVID, Influenza and RSV pending    Start azithromycin as directed  She has Mucinex at home and will continue with current use.  She declined use of cough syrup at this time.  There is no improvement may need to consider oral steroids.  But deferred today    Maintain good oral  hydration.  Follow-up with primary care provider if there is no improvement.    Continue with nasal irrigations.  Rinse 2 times daily for the next 1 to 2 weeks then return back to daily use.  Increase nasal spray to 2 sprays twice daily for 1 week and then reduce to once a day.  Continue with daily antihistamine    We will contact patient with further results  Recommended Jo to contact infusion department to notify them regarding low-grade temp and her current schedule for chemo.  She verbalizes understanding will return if symptoms worsen  Follow-up as routine schedule for ENT          Bridgette Ly PA-C  ENT  [facility], [place]            Again, thank you for allowing me to participate in the care of your patient.        Sincerely,        Bridgette Ly PA-C

## 2022-03-31 NOTE — NURSING NOTE
"Chief Complaint   Patient presents with     Otalgia     Ear pain       Initial /80 (BP Location: Right arm, Patient Position: Sitting, Cuff Size: Adult Regular)   Pulse 81   Temp 100.1  F (37.8  C) (Tympanic)   Ht 1.6 m (5' 3\")   Wt 72.6 kg (160 lb)   SpO2 97%   BMI 28.34 kg/m   Estimated body mass index is 28.34 kg/m  as calculated from the following:    Height as of this encounter: 1.6 m (5' 3\").    Weight as of this encounter: 72.6 kg (160 lb).  Medication Reconciliation: complete  Anna Montero LPN    "

## 2022-03-31 NOTE — PROGRESS NOTES
Chief Complaint   Patient presents with     Otalgia     Ear pain       She has ongoing concerns for otalgia, coughing, URI.   Symptoms started about 1 week ago and have been worsening over the last 24 hours. She had increase in nasal congestion, facial pain, post nasal drainage, right otalgia.   She has low grade temps. Reports symptoms developed after cleaning garage. However, she does have multiple myeloma and has been on maintenance chemotherapy.   She did take Muccinex DM.       +Fatigue, decreased energy.   +Sore throat   +coughing with increase drainage.   No fever.   +Mild body aches, but reports related to chemo.   She has been drinking water well.   She has been eating, but decreased.     Patient has a history of otitis media with effusion with prior placement of tympanostomy tubes.  Right tube has been absent and right TM intact without effusion.  Left TM does have small perforation noted.  Past Medical History:   Diagnosis Date     Arthritis      Cyst of breast      Depressive disorder      Diabetes mellitus, type 2 (H) 1/18/2021     Essential hypertension 10/1/2015     Major depressive disorder, recurrent episode, mild (H) 10/1/2015     Mixed hyperlipidemia 10/1/2015     Multiple myeloma not having achieved remission (H) 6/24/2019     Other specified disorders of bladder 07/09/2012    Irritable Bladder     Seasonal allergies 10/1/2015     Unspecified essential hypertension 03/19/2007     Unspecified sinusitis (chronic) 09/05/2007        Allergies   Allergen Reactions     Lisinopril Cough     Phenylephrine Hcl Other (See Comments)     **Entex  HEADACHE (SEVERE)     Phenylpropanolamine Other (See Comments)     **Entex  HEADACHE (SEVERE)     Pseudoephedrine Tannate Other (See Comments)     **Entex  HEADACHE (SEVERE)     Levofloxacin Rash     **Levaquin     Moxifloxacin Hcl [Avelox] Rash     Current Outpatient Medications   Medication     acyclovir (ZOVIRAX) 400 MG tablet     aspirin (ASA) 81 MG chewable  "tablet     atorvastatin (LIPITOR) 10 MG tablet     cetirizine (ZYRTEC) 10 MG tablet     dexamethasone (DECADRON) 4 MG tablet     fluticasone (FLONASE) 50 MCG/ACT nasal spray     L-Lysine HCl 500 MG CAPS     losartan (COZAAR) 50 MG tablet     sertraline (ZOLOFT) 50 MG tablet     EPINEPHrine (EPIPEN) 0.3 MG/0.3ML injection     prochlorperazine (COMPAZINE) 10 MG tablet     No current facility-administered medications for this visit.     ROS- SEE HPI  /80 (BP Location: Right arm, Patient Position: Sitting, Cuff Size: Adult Regular)   Pulse 81   Temp 100.1  F (37.8  C) (Tympanic)   Ht 1.6 m (5' 3\")   Wt 72.6 kg (160 lb)   SpO2 97%   BMI 28.34 kg/m      General - The patient is well nourished and well developed, and appears to have good nutritional status.  Patient is coughing throughout visit.  No acute distress.   Head and Face - Normocephalic and atraumatic, with no gross asymmetry noted of the contour of the facial features.  The facial nerve is intact, with strong symmetric movements.  Eyes - Extraocular movements intact, and the pupils were reactive to light.  Sclera were not icteric or injected, conjunctiva were pink and moist.  Mouth - Examination of the oral cavity shows pink, healthy, moist mucosa.  No lesions or ulceration noted.  The dentition are in good repair.  The tongue is mobile and midline.  Ears - Examination of the ears showed- Right canal clear. Right TM is intact without effusion or perforation.  There is a small pocket of retraction.   Left TM is with perforation, central anterior about 15%. ME appears healthy and dry.   Eyes - Extraocular movements intact, and the pupils were reactive to light.  Sclera were not icteric or injected, conjunctiva were pink and moist.  Mouth - Examination of the oral cavity showed pink, healthy oral mucosa. No lesions or ulcerations noted.  The tongue was mobile and midline, and the dentition were in good condition.    Throat - The walls of the oropharynx " were smooth, pink, moist, symmetric, and had no lesions or ulcerations.  The tonsillar pillars and soft palate were symmetric.  The uvula was midline on elevation.    Neck - Normal midline excursion of the laryngotracheal complex during swallowing.  Full range of motion on passive movement.  Palpation of the occipital, submental, submandibular, internal jugular chain, and supraclavicular nodes did not demonstrate any abnormal lymph nodes or masses.  Palpation of the thyroid was soft and smooth, with no nodules or goiter appreciated.  The trachea was mobile and midline.  Nose-outer nose appears normal.  Septum appears midline.  Bilateral nasal turbinates appear with moderate hypertrophy.  Clear secretions throughout  Heart regular rate  Lungs-clear anterior and posterior.  No wheeze or rales noted.  Good air exchange         ASSESSMENT/ PLAN:      ICD-10-CM    1. Cough  R05.9 Symptomatic; Yes; 3/24/2022 Influenza A/B & SARS-CoV2 (COVID-19) Virus PCR Multiplex Nose     azithromycin (ZITHROMAX) 250 MG tablet   2. Fever, unspecified fever cause  R50.9 Symptomatic; Yes; 3/24/2022 Influenza A/B & SARS-CoV2 (COVID-19) Virus PCR Multiplex Nose     azithromycin (ZITHROMAX) 250 MG tablet   3. Upper respiratory tract infection, unspecified type  J06.9 azithromycin (ZITHROMAX) 250 MG tablet   4. Perforation of tympanic membrane, left  H72.92    5. Multiple myeloma not having achieved remission (H)  C90.00         COVID, Influenza and RSV pending    Start azithromycin as directed  She has Mucinex at home and will continue with current use.  She declined use of cough syrup at this time.  There is no improvement may need to consider oral steroids.  But deferred today    Maintain good oral hydration.  Follow-up with primary care provider if there is no improvement.    Continue with nasal irrigations.  Rinse 2 times daily for the next 1 to 2 weeks then return back to daily use.  Increase nasal spray to 2 sprays twice daily for 1  week and then reduce to once a day.  Continue with daily antihistamine    We will contact patient with further results  Recommended Jo to contact infusion department to notify them regarding low-grade temp and her current schedule for chemo.  She verbalizes understanding will return if symptoms worsen  Follow-up as routine schedule for ENT          Bridgette Ly PA-C  ENT  [facility], [place]

## 2022-04-05 ENCOUNTER — LAB (OUTPATIENT)
Dept: LAB | Facility: OTHER | Age: 72
End: 2022-04-05
Payer: MEDICARE

## 2022-04-05 DIAGNOSIS — C90.00 MULTIPLE MYELOMA NOT HAVING ACHIEVED REMISSION (H): ICD-10-CM

## 2022-04-05 LAB
ALBUMIN SERPL-MCNC: 3.5 G/DL (ref 3.4–5)
ALP SERPL-CCNC: 106 U/L (ref 40–150)
ALT SERPL W P-5'-P-CCNC: 18 U/L (ref 0–50)
ANION GAP SERPL CALCULATED.3IONS-SCNC: 4 MMOL/L (ref 3–14)
AST SERPL W P-5'-P-CCNC: 18 U/L (ref 0–45)
BASOPHILS # BLD MANUAL: 0 10E3/UL (ref 0–0.2)
BASOPHILS NFR BLD MANUAL: 1 %
BILIRUB SERPL-MCNC: 0.7 MG/DL (ref 0.2–1.3)
BUN SERPL-MCNC: 14 MG/DL (ref 7–30)
CALCIUM SERPL-MCNC: 9.2 MG/DL (ref 8.5–10.1)
CHLORIDE BLD-SCNC: 102 MMOL/L (ref 94–109)
CO2 SERPL-SCNC: 26 MMOL/L (ref 20–32)
CREAT SERPL-MCNC: 0.63 MG/DL (ref 0.52–1.04)
EOSINOPHIL # BLD MANUAL: 0 10E3/UL (ref 0–0.7)
EOSINOPHIL NFR BLD MANUAL: 0 %
ERYTHROCYTE [DISTWIDTH] IN BLOOD BY AUTOMATED COUNT: 13.4 % (ref 10–15)
GFR SERPL CREATININE-BSD FRML MDRD: >90 ML/MIN/1.73M2
GLUCOSE BLD-MCNC: 139 MG/DL (ref 70–99)
HCT VFR BLD AUTO: 41.3 % (ref 35–47)
HGB BLD-MCNC: 13.9 G/DL (ref 11.7–15.7)
LYMPHOCYTES # BLD MANUAL: 0.6 10E3/UL (ref 0.8–5.3)
LYMPHOCYTES NFR BLD MANUAL: 17 %
MCH RBC QN AUTO: 30.6 PG (ref 26.5–33)
MCHC RBC AUTO-ENTMCNC: 33.7 G/DL (ref 31.5–36.5)
MCV RBC AUTO: 91 FL (ref 78–100)
MONOCYTES # BLD MANUAL: 0.2 10E3/UL (ref 0–1.3)
MONOCYTES NFR BLD MANUAL: 4 %
NEUTROPHILS # BLD MANUAL: 3 10E3/UL (ref 1.6–8.3)
NEUTROPHILS NFR BLD MANUAL: 78 %
PLAT MORPH BLD: ABNORMAL
PLATELET # BLD AUTO: 175 10E3/UL (ref 150–450)
POTASSIUM BLD-SCNC: 3.8 MMOL/L (ref 3.4–5.3)
PROT SERPL-MCNC: 7.1 G/DL (ref 6.8–8.8)
RBC # BLD AUTO: 4.54 10E6/UL (ref 3.8–5.2)
RBC MORPH BLD: ABNORMAL
SODIUM SERPL-SCNC: 132 MMOL/L (ref 133–144)
WBC # BLD AUTO: 3.8 10E3/UL (ref 4–11)

## 2022-04-05 PROCEDURE — 36415 COLL VENOUS BLD VENIPUNCTURE: CPT | Mod: ZL

## 2022-04-05 PROCEDURE — 86334 IMMUNOFIX E-PHORESIS SERUM: CPT | Mod: ZL | Performed by: PATHOLOGY

## 2022-04-05 PROCEDURE — 84165 PROTEIN E-PHORESIS SERUM: CPT | Mod: ZL | Performed by: PATHOLOGY

## 2022-04-05 PROCEDURE — 82310 ASSAY OF CALCIUM: CPT | Mod: ZL

## 2022-04-05 PROCEDURE — 82232 ASSAY OF BETA-2 PROTEIN: CPT | Mod: ZL

## 2022-04-05 PROCEDURE — 84155 ASSAY OF PROTEIN SERUM: CPT | Mod: ZL

## 2022-04-05 PROCEDURE — 82784 ASSAY IGA/IGD/IGG/IGM EACH: CPT | Mod: ZL

## 2022-04-05 PROCEDURE — 85810 BLOOD VISCOSITY EXAMINATION: CPT | Mod: ZL

## 2022-04-05 PROCEDURE — 85027 COMPLETE CBC AUTOMATED: CPT | Mod: ZL

## 2022-04-05 PROCEDURE — 83521 IG LIGHT CHAINS FREE EACH: CPT | Mod: ZL

## 2022-04-06 LAB — TOTAL PROTEIN SERUM FOR ELP: 6.7 G/DL (ref 6.8–8.8)

## 2022-04-07 LAB
ALBUMIN SERPL ELPH-MCNC: 4 G/DL (ref 3.7–5.1)
ALPHA1 GLOB SERPL ELPH-MCNC: 0.5 G/DL (ref 0.2–0.4)
ALPHA2 GLOB SERPL ELPH-MCNC: 0.9 G/DL (ref 0.5–0.9)
B-GLOBULIN SERPL ELPH-MCNC: 0.7 G/DL (ref 0.6–1)
B2 MICROGLOB TUMOR MARKER SER-MCNC: 2.5 MG/L
GAMMA GLOB SERPL ELPH-MCNC: 0.6 G/DL (ref 0.7–1.6)
IGA SERPL-MCNC: 13 MG/DL (ref 84–499)
IGG SERPL-MCNC: 684 MG/DL (ref 610–1616)
IGM SERPL-MCNC: 18 MG/DL (ref 35–242)
KAPPA LC FREE SER-MCNC: 0.77 MG/DL (ref 0.33–1.94)
KAPPA LC FREE/LAMBDA FREE SER NEPH: 1.18 {RATIO} (ref 0.26–1.65)
LAMBDA LC FREE SERPL-MCNC: 0.65 MG/DL (ref 0.57–2.63)
M PROTEIN SERPL ELPH-MCNC: 0.3 G/DL
PROT PATTERN SERPL ELPH-IMP: ABNORMAL
PROT PATTERN SERPL IFE-IMP: NORMAL
VISC SER: 1.5 CP (ref 1.4–1.8)

## 2022-04-07 PROCEDURE — 86334 IMMUNOFIX E-PHORESIS SERUM: CPT | Performed by: PATHOLOGY

## 2022-04-07 PROCEDURE — 84165 PROTEIN E-PHORESIS SERUM: CPT | Mod: 26 | Performed by: PATHOLOGY

## 2022-04-12 ENCOUNTER — ONCOLOGY VISIT (OUTPATIENT)
Dept: ONCOLOGY | Facility: OTHER | Age: 72
End: 2022-04-12
Attending: NURSE PRACTITIONER
Payer: MEDICARE

## 2022-04-12 ENCOUNTER — APPOINTMENT (OUTPATIENT)
Dept: LAB | Facility: OTHER | Age: 72
End: 2022-04-12
Attending: NURSE PRACTITIONER
Payer: MEDICARE

## 2022-04-12 ENCOUNTER — INFUSION THERAPY VISIT (OUTPATIENT)
Dept: INFUSION THERAPY | Facility: OTHER | Age: 72
End: 2022-04-12
Attending: INTERNAL MEDICINE
Payer: MEDICARE

## 2022-04-12 VITALS
OXYGEN SATURATION: 98 % | RESPIRATION RATE: 19 BRPM | DIASTOLIC BLOOD PRESSURE: 70 MMHG | BODY MASS INDEX: 28.59 KG/M2 | HEIGHT: 63 IN | TEMPERATURE: 98.2 F | HEART RATE: 96 BPM | SYSTOLIC BLOOD PRESSURE: 124 MMHG | WEIGHT: 161.38 LBS

## 2022-04-12 VITALS — SYSTOLIC BLOOD PRESSURE: 120 MMHG | HEART RATE: 80 BPM | DIASTOLIC BLOOD PRESSURE: 76 MMHG

## 2022-04-12 DIAGNOSIS — C90.00 MULTIPLE MYELOMA NOT HAVING ACHIEVED REMISSION (H): Primary | ICD-10-CM

## 2022-04-12 LAB
ALBUMIN SERPL-MCNC: 3.6 G/DL (ref 3.4–5)
ALP SERPL-CCNC: 113 U/L (ref 40–150)
ALT SERPL W P-5'-P-CCNC: 20 U/L (ref 0–50)
ANION GAP SERPL CALCULATED.3IONS-SCNC: 4 MMOL/L (ref 3–14)
AST SERPL W P-5'-P-CCNC: 16 U/L (ref 0–45)
BASOPHILS # BLD AUTO: 0 10E3/UL (ref 0–0.2)
BASOPHILS NFR BLD AUTO: 1 %
BILIRUB SERPL-MCNC: 0.4 MG/DL (ref 0.2–1.3)
BUN SERPL-MCNC: 20 MG/DL (ref 7–30)
CALCIUM SERPL-MCNC: 9.6 MG/DL (ref 8.5–10.1)
CHLORIDE BLD-SCNC: 104 MMOL/L (ref 94–109)
CO2 SERPL-SCNC: 27 MMOL/L (ref 20–32)
CREAT SERPL-MCNC: 0.72 MG/DL (ref 0.52–1.04)
EOSINOPHIL # BLD AUTO: 0.1 10E3/UL (ref 0–0.7)
EOSINOPHIL NFR BLD AUTO: 1 %
ERYTHROCYTE [DISTWIDTH] IN BLOOD BY AUTOMATED COUNT: 13.4 % (ref 10–15)
GFR SERPL CREATININE-BSD FRML MDRD: 88 ML/MIN/1.73M2
GLUCOSE BLD-MCNC: 144 MG/DL (ref 70–99)
HCT VFR BLD AUTO: 42.5 % (ref 35–47)
HGB BLD-MCNC: 14.4 G/DL (ref 11.7–15.7)
IMM GRANULOCYTES # BLD: 0.1 10E3/UL
IMM GRANULOCYTES NFR BLD: 2 %
LYMPHOCYTES # BLD AUTO: 0.5 10E3/UL (ref 0.8–5.3)
LYMPHOCYTES NFR BLD AUTO: 10 %
MCH RBC QN AUTO: 31.1 PG (ref 26.5–33)
MCHC RBC AUTO-ENTMCNC: 33.9 G/DL (ref 31.5–36.5)
MCV RBC AUTO: 92 FL (ref 78–100)
MONOCYTES # BLD AUTO: 0.2 10E3/UL (ref 0–1.3)
MONOCYTES NFR BLD AUTO: 4 %
NEUTROPHILS # BLD AUTO: 4.5 10E3/UL (ref 1.6–8.3)
NEUTROPHILS NFR BLD AUTO: 82 %
NRBC # BLD AUTO: 0 10E3/UL
NRBC BLD AUTO-RTO: 0 /100
PLATELET # BLD AUTO: 287 10E3/UL (ref 150–450)
POTASSIUM BLD-SCNC: 3.8 MMOL/L (ref 3.4–5.3)
PROT SERPL-MCNC: 7.1 G/DL (ref 6.8–8.8)
RBC # BLD AUTO: 4.63 10E6/UL (ref 3.8–5.2)
SODIUM SERPL-SCNC: 135 MMOL/L (ref 133–144)
WBC # BLD AUTO: 5.4 10E3/UL (ref 4–11)

## 2022-04-12 PROCEDURE — G0463 HOSPITAL OUTPT CLINIC VISIT: HCPCS | Mod: 25

## 2022-04-12 PROCEDURE — G0463 HOSPITAL OUTPT CLINIC VISIT: HCPCS

## 2022-04-12 PROCEDURE — 80053 COMPREHEN METABOLIC PANEL: CPT | Mod: ZL | Performed by: NURSE PRACTITIONER

## 2022-04-12 PROCEDURE — 99215 OFFICE O/P EST HI 40 MIN: CPT | Performed by: NURSE PRACTITIONER

## 2022-04-12 PROCEDURE — 96365 THER/PROPH/DIAG IV INF INIT: CPT

## 2022-04-12 PROCEDURE — 250N000011 HC RX IP 250 OP 636: Performed by: NURSE PRACTITIONER

## 2022-04-12 PROCEDURE — 85025 COMPLETE CBC W/AUTO DIFF WBC: CPT | Mod: ZL | Performed by: NURSE PRACTITIONER

## 2022-04-12 PROCEDURE — 36415 COLL VENOUS BLD VENIPUNCTURE: CPT | Mod: ZL | Performed by: NURSE PRACTITIONER

## 2022-04-12 PROCEDURE — 258N000003 HC RX IP 258 OP 636: Performed by: NURSE PRACTITIONER

## 2022-04-12 RX ORDER — ZOLEDRONIC ACID 0.04 MG/ML
4 INJECTION, SOLUTION INTRAVENOUS ONCE
Status: COMPLETED | OUTPATIENT
Start: 2022-04-12 | End: 2022-04-12

## 2022-04-12 RX ORDER — ZOLEDRONIC ACID 0.04 MG/ML
4 INJECTION, SOLUTION INTRAVENOUS ONCE
Status: CANCELLED | OUTPATIENT
Start: 2022-04-12 | End: 2022-04-12

## 2022-04-12 RX ADMIN — ZOLEDRONIC ACID 4 MG: 0.04 INJECTION, SOLUTION INTRAVENOUS at 15:02

## 2022-04-12 RX ADMIN — SODIUM CHLORIDE 250 ML: 9 INJECTION, SOLUTION INTRAVENOUS at 15:02

## 2022-04-12 ASSESSMENT — PAIN SCALES - GENERAL: PAINLEVEL: NO PAIN (0)

## 2022-04-12 NOTE — PATIENT INSTRUCTIONS
Thank you for allowing me to be a part of your care today.    We are going to treat with Zometa today. Afterwards, we are going to do a treatment holiday. I will see you back in 6 weeks with labs the week prior.     If you have any questions please call [phone]    Other instructions:      None

## 2022-04-12 NOTE — PROGRESS NOTES
Patient is a 70 y/o here accompanied by self today for infusion of zolendronic acid per order of Bonita Quintana.  Patient identified with two identifiers, order verified, and verbal consent for today's infusion obtained from patient.      Lab values:     Component      Latest Ref Rng & Units 4/12/2022   Sodium      133 - 144 mmol/L 135   Potassium      3.4 - 5.3 mmol/L 3.8   Chloride      94 - 109 mmol/L 104   Carbon Dioxide      20 - 32 mmol/L 27   Anion Gap      3 - 14 mmol/L 4   Urea Nitrogen      7 - 30 mg/dL 20   Creatinine      0.52 - 1.04 mg/dL 0.72   Calcium      8.5 - 10.1 mg/dL 9.6   Glucose      70 - 99 mg/dL 144 (H)   Alkaline Phosphatase      40 - 150 U/L 113   AST      0 - 45 U/L 16   ALT      0 - 50 U/L 20   Protein Total      6.8 - 8.8 g/dL 7.1   Albumin      3.4 - 5.0 g/dL 3.6   Bilirubin Total      0.2 - 1.3 mg/dL 0.4   GFR Estimate      >60 mL/min/1.73m2 88     CrCl= 66.7    Patient meets order parameters for today's treatment.  Per Nida Gonzalez NP, hold Faspro and velcade today because patient would like a 6 week holiday.  Okay to administer zometa.     Oncology toxicity assessment, fall risk, abuse screening, and medication/allergy reconciliation completed during provider visit.  Reviewed by this writer.      24 gauge angio cath inserted into right forearm.  Immediate blood return noted.  IV secured with sterile, transparent dressing and tape.  Patient tolerated well, denies pain or discomfort at this time.  Flushes easily without resistance, no signs or symptoms of infiltration or infection.   Patient denies questions or concerns regarding infusion and/or medication(s) being administered.     1525 IV pump verified with zolendronic acid dose, drug, and rate of administration.  Infusion administered per protocol.  Patient tolerated infusion well, no signs or symptoms of adverse reaction noted.  Patient denies pain nor discomfort.     IV removed, catheter intact.  Site clean, dry and  intact.  No signs or symptoms of infiltration or infection.  Covered with a sterile bandage, slight pressure applied for 30 seconds.  Pt instructed to leave bandage intact for a minimum of one hour, and to call with questions or concerns. Patient states understanding, discharged.

## 2022-04-12 NOTE — PROGRESS NOTES
Oncology Follow-up Visit    Reason for Visit:  Carmelita is a 71 year old woman with a diagnosis of multiple myeloma, who presents to the clinic today for consideration of ongoing therapy. She is due today to commence C23D1 Darzalex Faspro, Velcade, and Dexamethasone. She is also due for her next zometa dose.     Nursing Note and documentation reviewed: Yes    Interval History:   Carlene reports that she is doing well. She was recently diagnosed with a URI and was put on a Z pack. She just finished that today. She hasn't had fevers but a productive cough that has improved. No sinus pressure or headaches. No other signs of infection. No bleeding concerns. No nausea or vomiting. Appetite is good. No bowel concerns. Energy levels are decent. She has been tolerating the 25 mg Zoloft well. Feels energy is better and notes that her mood has been good. Had a recent dental visit. Everything looked very good.     Oncologic History  12/31/2018 Presented to the emergency room with vertigo and fatigue.  CT scan of the head was negative and subsequent stress test was negative.  5/3/2019 PCP ordered lab work and noted a total protein of 12.9.  SPEP at that time showed an M spike of 6.2 with a large monoclonal protein seen in the gamma fraction. Urine immunofixation showed a possible small protein band in the gamma fraction  5/31/2019 Evaluated by Dr. Walker with Medical Oncology with plan to rule out myeloma and obtain bone marrow aspiration biopsy as well as a metastatic bone survey along with additional labwork  6/18/2019 Underwent bone marrow aspiration and biopsy  6/24/2019 Seen again by Dr. Walker and CBC showed a hemoglobin of 9.3, M spike 7.3 with monoclonal IgG immunoglobulin of kappa light chain type; serum viscosity was 2.9; quantitative immunoglobulins showed an IgG of 8160, beta-2 microglobulin was 5.8, BUN was 21 with creatinine is 0.8 and total protein was 13.7.  Quantitative kappa/lambda free light chains showed an  elevated ratio of 17.0 bone marrow aspiration biopsy showed plasma cell myeloma with approximately 80% plasma cells.  Immunofixation showed IgG kappa and flow cytometry revealed kappa monotypic plasma cells consistent with clonal plasma cell neoplasm and FISH panel was pending at that time.  It was felt she had at least stage II disease based on her beta-2 microglobulin and anemia.  Plan was to treat with Velcade 1.3 mg per/m2 days 1, 4, 8 and 11/Decadron 40 mg on days 1, 8 and 15. Initiation of Revlimid with C2 at 25 mg daily days 1 through 14.  Plan was to also obtain an MRI of the lumbar spine to rule out lytic lesion at L3.  She was initiated on Zovirax 400 mg p.o. twice daily.  6/25/2019 C1 of chemotherapy initiated  7/1/2019 Note in chart regarding patient's large co-pay for the Revlimid and no plan at this point to initiate Revlimid and treat only with Velcade and Decadron per Dr. Walker  7/11/2019 MRI lumbar spine shows a pathologic superior endplate compression fracture at L3 without evidence of retropulsed fragment and innumerable enhancing lesions throughout the lumbar spine consistent with history of multiple myeloma.  9/10/2019 Increasing M-spike and kappa lambda ratio  10/1/2019 Initiation of CyBorD  11/17/2020 Plateau of M-spike at 1.2 after 36 cycles of CyBorD  12/1/2020 Initiation of Darzalex Faspro injection  3/23/2021 M-spike increased form 1.0 to 1.2 and velcade and dexamethasone added to plan  4/12/2022 Treatment HOLIDAY     Current Chemo Regime/TX:  Darzalex faspro injection 1800mg subcutaneous per protocol on Day 1 with velcade 1.3mg/m2 on days 1, 4, 8, 11 with weekly Dexamethasone, given every 21 days      **Zometa 4mg every 3 months  Current Cycle: Treatment holiday  # of completed cycles:  22  **Velcade & Dex added C5     Previous treatment:   Velcade 1.3 mg/m2 days 1, 4, 8 and 11 with Decadron 40 mg days 1, 8 and 15 x 4 cycles;   Velcade 1.5mg.m2/cyclophosphamide 150mg every 7 days on  "days 1,8,15 and 22/decadron 40mg days 1,8,15,22 ; Darzalex faspro injection 1800mg subcutaneous per protocol    Past Medical History:   Diagnosis Date     Arthritis      Cyst of breast      Depressive disorder      Diabetes mellitus, type 2 (H) 1/18/2021     Essential hypertension 10/1/2015     Major depressive disorder, recurrent episode, mild (H) 10/1/2015     Mixed hyperlipidemia 10/1/2015     Multiple myeloma not having achieved remission (H) 6/24/2019     Other specified disorders of bladder 07/09/2012    Irritable Bladder     Seasonal allergies 10/1/2015     Unspecified essential hypertension 03/19/2007     Unspecified sinusitis (chronic) 09/05/2007       Past Surgical History:   Procedure Laterality Date     APPENDECTOMY       BONE MARROW BIOPSY, BONE SPECIMEN, NEEDLE/TROCAR N/A 6/18/2019    Procedure: BONE MARROW BIOPSY;  Surgeon: Maciej Sanz MD;  Location: [facility]     CHOLECYSTECTOMY       COLONOSCOPY  07-    repeat 10 years     COLONOSCOPY N/A 12/30/2016    Procedure: COLONOSCOPY;  Surgeon: Bhaskar Franklin DO;  Location: [facility] OR     PUNC/ASPIR BREAST CYST Left 1995    benign     SINUS SURGERY       TUBAL STERILIZATION         Family History   Problem Relation Age of Onset     Breast Cancer Mother 60        Cause of Death     Parkinsonism Father         \"Possible\"     Coronary Artery Disease Father      Pacemaker Father      Thyroid Disease Daughter      Breast Cancer Maternal Aunt         over 50     Diabetes No family hx of      Hypertension No family hx of      Hyperlipidemia No family hx of      Cerebrovascular Disease No family hx of      Colon Cancer No family hx of      Prostate Cancer No family hx of      Genetic Disorder No family hx of      Asthma No family hx of      Anesthesia Reaction No family hx of        Social History     Socioeconomic History     Marital status:      Spouse name: Not on file     Number of children: Not on file     Years of education: Not on file     " Highest education level: Not on file   Occupational History     Occupation: Financial     Comment:  - ([employer])   Tobacco Use     Smoking status: Never Smoker     Smokeless tobacco: Never Used     Tobacco comment: Tried to Quit (YES); QUIT in 1971; Passive Exposure (NO)   Substance and Sexual Activity     Alcohol use: Yes     Comment: RARELY     Drug use: No     Sexual activity: Yes     Partners: Male     Birth control/protection: None   Other Topics Concern      Service Not Asked     Blood Transfusions Not Asked     Caffeine Concern Yes     Comment: Coffee >6 cups daily     Occupational Exposure Not Asked     Hobby Hazards Not Asked     Sleep Concern Not Asked     Stress Concern Not Asked     Weight Concern Not Asked     Special Diet Not Asked     Back Care Not Asked     Exercise Not Asked     Bike Helmet Not Asked     Seat Belt Not Asked     Self-Exams Not Asked     Parent/sibling w/ CABG, MI or angioplasty before 65F 55M? No   Social History Narrative     Not on file     Social Determinants of Health     Financial Resource Strain: Not on file   Food Insecurity: Not on file   Transportation Needs: Not on file   Physical Activity: Not on file   Stress: Not on file   Social Connections: Not on file   Intimate Partner Violence: Not on file   Housing Stability: Not on file       Current Outpatient Medications   Medication     acyclovir (ZOVIRAX) 400 MG tablet     aspirin (ASA) 81 MG chewable tablet     atorvastatin (LIPITOR) 10 MG tablet     cetirizine (ZYRTEC) 10 MG tablet     dexamethasone (DECADRON) 4 MG tablet     fluticasone (FLONASE) 50 MCG/ACT nasal spray     L-Lysine HCl 500 MG CAPS     losartan (COZAAR) 50 MG tablet     sertraline (ZOLOFT) 50 MG tablet     EPINEPHrine (EPIPEN) 0.3 MG/0.3ML injection     prochlorperazine (COMPAZINE) 10 MG tablet     No current facility-administered medications for this visit.     Facility-Administered Medications Ordered in Other Visits   Medication      "zoledronic acid (ZOMETA) intermittent infusion 4 mg        Allergies   Allergen Reactions     Lisinopril Cough     Phenylephrine Hcl Other (See Comments)     **Entex  HEADACHE (SEVERE)     Phenylpropanolamine Other (See Comments)     **Entex  HEADACHE (SEVERE)     Pseudoephedrine Tannate Other (See Comments)     **Entex  HEADACHE (SEVERE)     Levofloxacin Rash     **Levaquin     Moxifloxacin Hcl [Avelox] Rash       Review Of Systems:  A complete review of systems is negative except for the above mentioned items in the interval history.     ECOG Performance Status: 0    Physical Exam:  /70   Pulse 96   Temp 98.2  F (36.8  C) (Tympanic)   Resp 19   Ht 1.6 m (5' 3\")   Wt 73.2 kg (161 lb 6 oz)   SpO2 98%   BMI 28.59 kg/m    GENERAL APPEARANCE: Healthy, alert and in no acute distress.  HEENT: Eyes appear normal without scleral icterus. Extraocular movements intact. Mouth appears normal without lesions, ulcers, or thrush.  NECK:   Supple with normal range of motion. No asymmetry or masses.  LYMPHATICS: No palpable cervical, supraclavicular nodes.  RESP: Lungs clear to auscultation bilaterally, respirations regular and easy.  CARDIOVASCULAR: Regular rate and rhythm. Normal S1, S2; no murmur, gallop, or rub.  ABDOMEN: Soft, non-tender, non-distended. Bowel sounds auscultated all 4 quadrants. No palpable organomegaly or masses.  MUSCULOSKELETAL: Extremities without gross deformities noted. No edema of bilateral lower extremities.  SKIN: No suspicious lesions or rashes.  NEURO: Alert and oriented x 3.  Gait steady.  PSYCHIATRIC: Mentation and affect appear normal.  Mood appropriate.    Laboratory: Myeloma studies drawn 04/05 reviewed in Epic. Mspike took slight reduction to 0.3. Light chains stable. Immunoglobulins stable. Macroglobulins and Beta 2 microglobulin stable.   Results for orders placed or performed in visit on 04/12/22   Comprehensive metabolic panel     Status: Abnormal   Result Value Ref Range    " Sodium 135 133 - 144 mmol/L    Potassium 3.8 3.4 - 5.3 mmol/L    Chloride 104 94 - 109 mmol/L    Carbon Dioxide (CO2) 27 20 - 32 mmol/L    Anion Gap 4 3 - 14 mmol/L    Urea Nitrogen 20 7 - 30 mg/dL    Creatinine 0.72 0.52 - 1.04 mg/dL    Calcium 9.6 8.5 - 10.1 mg/dL    Glucose 144 (H) 70 - 99 mg/dL    Alkaline Phosphatase 113 40 - 150 U/L    AST 16 0 - 45 U/L    ALT 20 0 - 50 U/L    Protein Total 7.1 6.8 - 8.8 g/dL    Albumin 3.6 3.4 - 5.0 g/dL    Bilirubin Total 0.4 0.2 - 1.3 mg/dL    GFR Estimate 88 >60 mL/min/1.73m2   CBC with platelets and differential     Status: Abnormal   Result Value Ref Range    WBC Count 5.4 4.0 - 11.0 10e3/uL    RBC Count 4.63 3.80 - 5.20 10e6/uL    Hemoglobin 14.4 11.7 - 15.7 g/dL    Hematocrit 42.5 35.0 - 47.0 %    MCV 92 78 - 100 fL    MCH 31.1 26.5 - 33.0 pg    MCHC 33.9 31.5 - 36.5 g/dL    RDW 13.4 10.0 - 15.0 %    Platelet Count 287 150 - 450 10e3/uL    % Neutrophils 82 %    % Lymphocytes 10 %    % Monocytes 4 %    % Eosinophils 1 %    % Basophils 1 %    % Immature Granulocytes 2 %    NRBCs per 100 WBC 0 <1 /100    Absolute Neutrophils 4.5 1.6 - 8.3 10e3/uL    Absolute Lymphocytes 0.5 (L) 0.8 - 5.3 10e3/uL    Absolute Monocytes 0.2 0.0 - 1.3 10e3/uL    Absolute Eosinophils 0.1 0.0 - 0.7 10e3/uL    Absolute Basophils 0.0 0.0 - 0.2 10e3/uL    Absolute Immature Granulocytes 0.1 <=0.4 10e3/uL    Absolute NRBCs 0.0 10e3/uL   CBC with platelets and differential     Status: Abnormal    Narrative    The following orders were created for panel order CBC with platelets and differential.  Procedure                               Abnormality         Status                     ---------                               -----------         ------                     CBC with platelets and d...[[id number]]  Abnormal            Final result                 Please view results for these tests on the individual orders.       Imaging Studies:  None this visit.     ASSESSMENT/PLAN:  #1 Multiple  myeloma:   IgG kappa multiple myeloma with 80% involvement of the bone marrow diagnosed June 2019 initially treated with Velcade and Decadron and received 4 cycles with increasing M-spike and kappa/lambda ratio.  Treatment changed to CyBorD on 10/1/2019.  M-spike plateau at 1.2 and treatment changed to monotherapy with Darzalex Faspro injection. M spike declined to 1.0 then increased again to 1.2 so Velcade and Dex added to her treatment plan with cycle 5 therapy. Today, she is due for C23 therapy. Given her recent relative stableness in her mspike and her desire for a treatment holiday, I did discuss this with Dr. Walker. He agreed that we could do a treatment holiday for 6 weeks initially. We will recheck labs in 6 weeks. Carlene is aware that we will have to start therapy again at some point but we will monitor labs for now. She is very intrigued by this and very excited.      #2 Lytic lesions: Overdue for zometa. Given all her dental work in complete, we will proceed with Zometa today. She remains on calcium with vitamin D twice a day.    #3 Depression Tolerating the 25 mg Zoloft well. Continue at this dose. Hoping a treatment holiday helps as well.     Patient in agreement with plan and verbalizes understanding. Agrees to call with any questions or concerns.    45 minutes spent in the patient's encounter today with time spent in review of patient's chart along with chart preparation and review of the treatment plan and signing of treatment plan.  Time was also spent with the patient in obtaining a review of systems and performing a physical exam along with detailed review of all test results. Time was also spent in discussing plan for future follow-up and relating instructions for follow-up and in placing future orders.    Nida Gonzalez, FLO, FNP-C     I have personally seen and examined this patient.  I have fully participated in the care of this patient. I have reviewed all pertinent clinical information, including history, physical exam, plan and the Resident’s note and agree except as noted.

## 2022-04-12 NOTE — NURSING NOTE
"Oncology Rooming Note    April 12, 2022 1:48 PM   Carmelita Watts is a 71 year old female who presents for:    Chief Complaint   Patient presents with     Oncology Clinic Visit     Follow up. Multiple myeloma not having achieved remission      Initial Vitals: /70   Pulse 96   Temp 98.2  F (36.8  C) (Tympanic)   Resp 19   Ht 1.6 m (5' 3\")   Wt 73.2 kg (161 lb 6 oz)   SpO2 98%   BMI 28.59 kg/m   Estimated body mass index is 28.59 kg/m  as calculated from the following:    Height as of this encounter: 1.6 m (5' 3\").    Weight as of this encounter: 73.2 kg (161 lb 6 oz). Body surface area is 1.8 meters squared.  No Pain (0) Comment: Data Unavailable   No LMP recorded. Patient is postmenopausal.  Allergies reviewed: Yes  Medications reviewed: Yes    Medications: Medication refills not needed today.  Pharmacy name entered into [facility]:    [id number] - [place], [place] - [address]  AT [facility] [address]  [facility] - [place], [place] - [address]  [facility] - [place] - [address]              "

## 2022-04-26 ENCOUNTER — TELEPHONE (OUTPATIENT)
Dept: OTOLARYNGOLOGY | Facility: OTHER | Age: 72
End: 2022-04-26

## 2022-04-26 NOTE — TELEPHONE ENCOUNTER
Patient called and saw Bridgette Ly previous during her cold about her ears being plugged. She stated she is now getting over the cold but her right ear is still pretty plugged and she isnt able to hear even with her hearing aids in. I scheduled her for the next available appt which is May 18th and also added her to the wait list. She would like a nurse to call back and see maybe she should wait it out and see if maybe she is still just recovering from her cold.    [phone]          Daisy Roberts

## 2022-04-27 NOTE — TELEPHONE ENCOUNTER
Valentina, please schedule her tomorrow at the end of the day.  See if Bia can get her in today or tomorrow.

## 2022-05-16 NOTE — PROGRESS NOTES
Assessment & Plan     Hyperlipidemia LDL goal <130  Improved lipid.   - Lipid Profile (Chol, Trig, HDL, LDL calc); Future  - Lipid Profile (Chol, Trig, HDL, LDL calc)    Type 2 diabetes mellitus with hyperosmolarity without coma, without long-term current use of insulin (H)  she has been watching her sugar and has been very good. Eye exam is up to date  Feet are good.  Limited exposure to her her immune status.   - Hemoglobin A1c; Future  - Hemoglobin A1c    Multiple myeloma not having achieved remission (H)  On a chemo holiday. She had labs today.  Looks good and feels good. Pain in her back related to gardening. Using a mask due to immune status.  Feeling more energized.   - Beta 2 microglobulin  - Immunoglobulins A G and M  - Kappa and lambda light chain  - Protein electrophoresis  - Protein Immunofixation Serum  - Macroglobulins  - CBC with platelets and differential  - Comprehensive metabolic panel    Review of external notes as documented elsewhere in note  Ordering of each unique test  Prescription drug management  10  minutes spent on the date of the encounter doing chart review, history and exam, documentation and further activities per the note       Regular exercise  See Patient Instructions    No follow-ups on file.    VENKATESH Trevino  [facility] - [place]    Subjective   Carmelita is a 71 year old who presents for the following health issues     HPI     Diabetes Follow-up      How often are you checking your blood sugar? Not at all    What concerns do you have today about your diabetes? None     Do you have any of these symptoms? (Select all that apply)  No numbness or tingling in feet.  No redness, sores or blisters on feet.  No complaints of excessive thirst.  No reports of blurry vision.  No significant changes to weight.    Have you had a diabetic eye exam in the last 12 months? No          Hyperlipidemia Follow-Up      Are you regularly taking any medication or supplement to  lower your cholesterol?   Yes- lipitor    Are you having muscle aches or other side effects that you think could be caused by your cholesterol lowering medication?  No    Hypertension Follow-up      Do you check your blood pressure regularly outside of the clinic? No     Are you following a low salt diet? No    Are your blood pressures ever more than 140 on the top number (systolic) OR more   than 90 on the bottom number (diastolic), for example 140/90? No    BP Readings from Last 2 Encounters:   05/18/22 120/74   04/12/22 120/76     Hemoglobin A1C POCT (%)   Date Value   04/27/2021 6.4 (H)   01/26/2021 6.3 (H)     Hemoglobin A1C (%)   Date Value   02/22/2022 6.3 (H)   11/17/2021 6.2 (H)     LDL Cholesterol Calculated (mg/dL)   Date Value   05/18/2022 105 (H)   08/17/2021 166 (H)   04/27/2021 137 (H)   02/14/2020 125 (H)       Depression and Anxiety Follow-Up    How are you doing with your depression since your last visit? Improved went on drug holiday. Feels it is making her feel better. Improved outlook.     How are you doing with your anxiety since your last visit?  Improved     Are you having other symptoms that might be associated with depression or anxiety? No    Have you had a significant life event? No     Do you have any concerns with your use of alcohol or other drugs? No    Social History     Tobacco Use     Smoking status: Never Smoker     Smokeless tobacco: Never Used     Tobacco comment: Tried to Quit (YES); QUIT in 1971; Passive Exposure (NO)   Substance Use Topics     Alcohol use: Yes     Comment: RARELY     Drug use: No     PHQ 8/16/2021 11/17/2021 2/17/2022   PHQ-9 Total Score 0 0 0   Q9: Thoughts of better off dead/self-harm past 2 weeks Not at all Not at all Not at all     MINAL-7 SCORE 8/16/2021 11/17/2021 2/17/2022   Total Score 0 0 0     Last PHQ-9 2/17/2022   1.  Little interest or pleasure in doing things 0   2.  Feeling down, depressed, or hopeless 0   3.  Trouble falling or staying asleep,  "or sleeping too much 0   4.  Feeling tired or having little energy 0   5.  Poor appetite or overeating 0   6.  Feeling bad about yourself 0   7.  Trouble concentrating 0   8.  Moving slowly or restless 0   Q9: Thoughts of better off dead/self-harm past 2 weeks 0   PHQ-9 Total Score 0   Difficulty at work, home, or with people -     MINAL-7  2/17/2022   1. Feeling nervous, anxious, or on edge 0   2. Not being able to stop or control worrying 0   3. Worrying too much about different things 0   4. Trouble relaxing 0   5. Being so restless that it is hard to sit still 0   6. Becoming easily annoyed or irritable 0   7. Feeling afraid, as if something awful might happen 0   MINAL-7 Total Score 0   If you checked any problems, how difficult have they made it for you to do your work, take care of things at home, or get along with other people? -       Suicide Assessment Five-step Evaluation and Treatment (SAFE-T)        Review of Systems   CONSTITUTIONAL:NEGATIVE for fever, chills, change in weight  INTEGUMENTARY/SKIN: NEGATIVE for worrisome rashes, moles or lesions  ENT/MOUTH: NEGATIVE for ear, mouth and throat problems  RESP:NEGATIVE for significant cough or SOB  CV: NEGATIVE for chest pain, palpitations or peripheral edema  MUSCULOSKELETAL: she is on a drug holiday and seems to be doing better.   NEURO: NEGATIVE for weakness, dizziness or paresthesias  ENDOCRINE: NEGATIVE for temperature intolerance, skin/hair changes  PSYCHIATRIC: improved outlook       Objective    /74 (BP Location: Left arm, Patient Position: Sitting, Cuff Size: Adult Regular)   Pulse 81   Temp 97.7  F (36.5  C) (Tympanic)   Ht 1.6 m (5' 3\")   Wt 73.5 kg (162 lb)   SpO2 98%   BMI 28.70 kg/m    Body mass index is 28.7 kg/m .  Physical Exam   GENERAL: healthy, alert and no distress  EYES: Eyes grossly normal to inspection, PERRL and conjunctivae and sclerae normal  HENT: ear canals and TM's normal, nose and mouth without ulcers or lesions  NECK: " no adenopathy, no asymmetry, masses, or scars and thyroid normal to palpation  RESP: lungs clear to auscultation - no rales, rhonchi or wheezes  CV: regular rate and rhythm, normal S1 S2, no S3 or S4, no murmur, click or rub, no peripheral edema and peripheral pulses strong  ABDOMEN: soft, nontender, no hepatosplenomegaly, no masses and bowel sounds normal  MS: no gross musculoskeletal defects noted, no edema  SKIN: no suspicious lesions or rashes  NEURO: Normal strength and tone, mentation intact and speech normal  BACK: no CVA tenderness, no paralumbar tenderness  PSYCH: mentation appears normal, affect normal/bright, very good out look. She is feeling recharged .      Results for orders placed or performed in visit on 05/18/22   Comprehensive metabolic panel     Status: Abnormal   Result Value Ref Range    Sodium 137 133 - 144 mmol/L    Potassium 3.9 3.4 - 5.3 mmol/L    Chloride 103 94 - 109 mmol/L    Carbon Dioxide (CO2) 27 20 - 32 mmol/L    Anion Gap 7 3 - 14 mmol/L    Urea Nitrogen 17 7 - 30 mg/dL    Creatinine 0.70 0.52 - 1.04 mg/dL    Calcium 9.1 8.5 - 10.1 mg/dL    Glucose 118 (H) 70 - 99 mg/dL    Alkaline Phosphatase 121 40 - 150 U/L    AST 17 0 - 45 U/L    ALT 21 0 - 50 U/L    Protein Total 7.1 6.8 - 8.8 g/dL    Albumin 3.7 3.4 - 5.0 g/dL    Bilirubin Total 0.5 0.2 - 1.3 mg/dL    GFR Estimate >90 >60 mL/min/1.73m2   Lipid Profile (Chol, Trig, HDL, LDL calc)     Status: Abnormal   Result Value Ref Range    Cholesterol 190 <200 mg/dL    Triglycerides 191 (H) <150 mg/dL    Direct Measure HDL 47 (L) >=50 mg/dL    LDL Cholesterol Calculated 105 (H) <=100 mg/dL    Non HDL Cholesterol 143 (H) <130 mg/dL    Patient Fasting > 8hrs? Yes     Narrative    Cholesterol  Desirable:  <200 mg/dL    Triglycerides  Normal:  Less than 150 mg/dL  Borderline High:  150-199 mg/dL  High:  200-499 mg/dL  Very High:  Greater than or equal to 500 mg/dL    Direct Measure HDL  Female:  Greater than or equal to 50 mg/dL   Male:   Greater than or equal to 40 mg/dL    LDL Cholesterol  Desirable:  <100mg/dL  Above Desirable:  100-129 mg/dL   Borderline High:  130-159 mg/dL   High:  160-189 mg/dL   Very High:  >= 190 mg/dL    Non HDL Cholesterol  Desirable:  130 mg/dL  Above Desirable:  130-159 mg/dL  Borderline High:  160-189 mg/dL  High:  190-219 mg/dL  Very High:  Greater than or equal to 220 mg/dL   CBC with platelets and differential     Status: None   Result Value Ref Range    WBC Count 4.2 4.0 - 11.0 10e3/uL    RBC Count 4.45 3.80 - 5.20 10e6/uL    Hemoglobin 14.1 11.7 - 15.7 g/dL    Hematocrit 41.1 35.0 - 47.0 %    MCV 92 78 - 100 fL    MCH 31.7 26.5 - 33.0 pg    MCHC 34.3 31.5 - 36.5 g/dL    RDW 13.3 10.0 - 15.0 %    Platelet Count 214 150 - 450 10e3/uL    % Neutrophils 51 %    % Lymphocytes 32 %    % Monocytes 11 %    % Eosinophils 4 %    % Basophils 1 %    % Immature Granulocytes 1 %    NRBCs per 100 WBC 0 <1 /100    Absolute Neutrophils 2.2 1.6 - 8.3 10e3/uL    Absolute Lymphocytes 1.3 0.8 - 5.3 10e3/uL    Absolute Monocytes 0.5 0.0 - 1.3 10e3/uL    Absolute Eosinophils 0.2 0.0 - 0.7 10e3/uL    Absolute Basophils 0.0 0.0 - 0.2 10e3/uL    Absolute Immature Granulocytes 0.0 <=0.4 10e3/uL    Absolute NRBCs 0.0 10e3/uL   Protein electrophoresis     Status: None (In process)    Narrative    The following orders were created for panel order Protein electrophoresis.  Procedure                               Abnormality         Status                     ---------                               -----------         ------                     Total Protein, Serum for...[[id number]]                      In process                 Protein Electrophoresis,...[[id number]]                      In process                   Please view results for these tests on the individual orders.   CBC with platelets and differential     Status: None    Narrative    The following orders were created for panel order CBC with platelets and differential.  Procedure                                Abnormality         Status                     ---------                               -----------         ------                     CBC with platelets and d...[[id number]]                      Final result                 Please view results for these tests on the individual orders.

## 2022-05-16 NOTE — PROGRESS NOTES
Impression: Myopia, bilateral: H52.13. Plan: Patient education significant anisometropia secondary to cataract OD and pseudophakia OS. Understands may have trouble with Rx. Recommend glasses for best vision. Patient is a 69 year old female here today for injection of Velcade per order of . Patient meets parameters for today's infusion. Patient identified with two identifiers, order verified, and verbal consent for today's infusion obtained from patient. Written consent for treatment is on file and valid.    Today's lab values: WBC: 6, HGB: 12.1, PLT: 230  ANC: 3.2.     Patient meets order parameters for today's treatment.    Patient denies questions or concerns regarding injection and/or medication(s) being administered.    Independent dose check completed with Julia ADEN RN.    Velcade injected SQ into right upper outer arm at 45 degree angle  per protocol rotating sites. Patient tolerated injection without incident, no signs or symptoms of adverse reaction noted. Patient denies pain or discomfort.     Covered with a sterile bandage. Pt instructed to leave bandage intact for a minimum of one hour, and to call with questions or concerns. Patient states understanding, discharged.

## 2022-05-18 ENCOUNTER — OFFICE VISIT (OUTPATIENT)
Dept: FAMILY MEDICINE | Facility: OTHER | Age: 72
End: 2022-05-18
Attending: PHYSICIAN ASSISTANT
Payer: MEDICARE

## 2022-05-18 VITALS
BODY MASS INDEX: 28.7 KG/M2 | SYSTOLIC BLOOD PRESSURE: 120 MMHG | HEART RATE: 81 BPM | TEMPERATURE: 97.7 F | DIASTOLIC BLOOD PRESSURE: 74 MMHG | WEIGHT: 162 LBS | OXYGEN SATURATION: 98 % | HEIGHT: 63 IN

## 2022-05-18 DIAGNOSIS — E78.5 HYPERLIPIDEMIA LDL GOAL <130: Primary | ICD-10-CM

## 2022-05-18 DIAGNOSIS — F33.0 MAJOR DEPRESSIVE DISORDER, RECURRENT EPISODE, MILD (H): ICD-10-CM

## 2022-05-18 DIAGNOSIS — C90.00 MULTIPLE MYELOMA NOT HAVING ACHIEVED REMISSION (H): ICD-10-CM

## 2022-05-18 DIAGNOSIS — E11.00 TYPE 2 DIABETES MELLITUS WITH HYPEROSMOLARITY WITHOUT COMA, WITHOUT LONG-TERM CURRENT USE OF INSULIN (H): ICD-10-CM

## 2022-05-18 LAB
ALBUMIN SERPL-MCNC: 3.7 G/DL (ref 3.4–5)
ALP SERPL-CCNC: 121 U/L (ref 40–150)
ALT SERPL W P-5'-P-CCNC: 21 U/L (ref 0–50)
ANION GAP SERPL CALCULATED.3IONS-SCNC: 7 MMOL/L (ref 3–14)
AST SERPL W P-5'-P-CCNC: 17 U/L (ref 0–45)
BASOPHILS # BLD AUTO: 0 10E3/UL (ref 0–0.2)
BASOPHILS NFR BLD AUTO: 1 %
BILIRUB SERPL-MCNC: 0.5 MG/DL (ref 0.2–1.3)
BUN SERPL-MCNC: 17 MG/DL (ref 7–30)
CALCIUM SERPL-MCNC: 9.1 MG/DL (ref 8.5–10.1)
CHLORIDE BLD-SCNC: 103 MMOL/L (ref 94–109)
CHOLEST SERPL-MCNC: 190 MG/DL
CO2 SERPL-SCNC: 27 MMOL/L (ref 20–32)
CREAT SERPL-MCNC: 0.7 MG/DL (ref 0.52–1.04)
CREAT UR-MCNC: 44 MG/DL
EOSINOPHIL # BLD AUTO: 0.2 10E3/UL (ref 0–0.7)
EOSINOPHIL NFR BLD AUTO: 4 %
ERYTHROCYTE [DISTWIDTH] IN BLOOD BY AUTOMATED COUNT: 13.3 % (ref 10–15)
EST. AVERAGE GLUCOSE BLD GHB EST-MCNC: 126 MG/DL
FASTING STATUS PATIENT QL REPORTED: YES
GFR SERPL CREATININE-BSD FRML MDRD: >90 ML/MIN/1.73M2
GLUCOSE BLD-MCNC: 118 MG/DL (ref 70–99)
HBA1C MFR BLD: 6 % (ref 0–5.6)
HCT VFR BLD AUTO: 41.1 % (ref 35–47)
HDLC SERPL-MCNC: 47 MG/DL
HGB BLD-MCNC: 14.1 G/DL (ref 11.7–15.7)
IMM GRANULOCYTES # BLD: 0 10E3/UL
IMM GRANULOCYTES NFR BLD: 1 %
LDLC SERPL CALC-MCNC: 105 MG/DL
LYMPHOCYTES # BLD AUTO: 1.3 10E3/UL (ref 0.8–5.3)
LYMPHOCYTES NFR BLD AUTO: 32 %
MCH RBC QN AUTO: 31.7 PG (ref 26.5–33)
MCHC RBC AUTO-ENTMCNC: 34.3 G/DL (ref 31.5–36.5)
MCV RBC AUTO: 92 FL (ref 78–100)
MICROALBUMIN UR-MCNC: <5 MG/L
MICROALBUMIN/CREAT UR: NORMAL MG/G{CREAT}
MONOCYTES # BLD AUTO: 0.5 10E3/UL (ref 0–1.3)
MONOCYTES NFR BLD AUTO: 11 %
NEUTROPHILS # BLD AUTO: 2.2 10E3/UL (ref 1.6–8.3)
NEUTROPHILS NFR BLD AUTO: 51 %
NONHDLC SERPL-MCNC: 143 MG/DL
NRBC # BLD AUTO: 0 10E3/UL
NRBC BLD AUTO-RTO: 0 /100
PLATELET # BLD AUTO: 214 10E3/UL (ref 150–450)
POTASSIUM BLD-SCNC: 3.9 MMOL/L (ref 3.4–5.3)
PROT SERPL-MCNC: 7.1 G/DL (ref 6.8–8.8)
RBC # BLD AUTO: 4.45 10E6/UL (ref 3.8–5.2)
SODIUM SERPL-SCNC: 137 MMOL/L (ref 133–144)
TOTAL PROTEIN SERUM FOR ELP: 6.6 G/DL (ref 6.8–8.8)
TRIGL SERPL-MCNC: 191 MG/DL
WBC # BLD AUTO: 4.2 10E3/UL (ref 4–11)

## 2022-05-18 PROCEDURE — 86334 IMMUNOFIX E-PHORESIS SERUM: CPT | Mod: ZL | Performed by: PATHOLOGY

## 2022-05-18 PROCEDURE — 84155 ASSAY OF PROTEIN SERUM: CPT | Mod: ZL,91 | Performed by: PHYSICIAN ASSISTANT

## 2022-05-18 PROCEDURE — G0463 HOSPITAL OUTPT CLINIC VISIT: HCPCS

## 2022-05-18 PROCEDURE — 83036 HEMOGLOBIN GLYCOSYLATED A1C: CPT | Mod: ZL | Performed by: PHYSICIAN ASSISTANT

## 2022-05-18 PROCEDURE — 82232 ASSAY OF BETA-2 PROTEIN: CPT | Mod: ZL | Performed by: PHYSICIAN ASSISTANT

## 2022-05-18 PROCEDURE — 83521 IG LIGHT CHAINS FREE EACH: CPT | Mod: ZL | Performed by: PHYSICIAN ASSISTANT

## 2022-05-18 PROCEDURE — 36415 COLL VENOUS BLD VENIPUNCTURE: CPT | Mod: ZL | Performed by: PHYSICIAN ASSISTANT

## 2022-05-18 PROCEDURE — 82043 UR ALBUMIN QUANTITATIVE: CPT | Mod: ZL | Performed by: PHYSICIAN ASSISTANT

## 2022-05-18 PROCEDURE — 85810 BLOOD VISCOSITY EXAMINATION: CPT | Mod: ZL | Performed by: PHYSICIAN ASSISTANT

## 2022-05-18 PROCEDURE — 84165 PROTEIN E-PHORESIS SERUM: CPT | Mod: ZL | Performed by: PATHOLOGY

## 2022-05-18 PROCEDURE — 80061 LIPID PANEL: CPT | Mod: ZL | Performed by: PHYSICIAN ASSISTANT

## 2022-05-18 PROCEDURE — 85025 COMPLETE CBC W/AUTO DIFF WBC: CPT | Mod: ZL | Performed by: PHYSICIAN ASSISTANT

## 2022-05-18 PROCEDURE — 80053 COMPREHEN METABOLIC PANEL: CPT | Mod: ZL | Performed by: PHYSICIAN ASSISTANT

## 2022-05-18 PROCEDURE — 82784 ASSAY IGA/IGD/IGG/IGM EACH: CPT | Mod: ZL | Performed by: PHYSICIAN ASSISTANT

## 2022-05-18 PROCEDURE — 99214 OFFICE O/P EST MOD 30 MIN: CPT | Performed by: PHYSICIAN ASSISTANT

## 2022-05-18 ASSESSMENT — PAIN SCALES - GENERAL: PAINLEVEL: NO PAIN (0)

## 2022-05-18 NOTE — PATIENT INSTRUCTIONS
Thank you for choosing [facility].   I have office hours 8:00 am to 4:30 pm on Monday's, Wednesday's, Thursday's and Friday's. My nurse and I are out of the office every Tuesday.    Following your visit, when your labs and diagnostic testing have returned, I will review then and you will be contacted by my nurse.  If you are on My Chart, you can also view results there.    For refills, notify your pharmacy regarding what you need and the pharmacy will generate a refill request. Do not call my nurse as she is unable to process refill request. Please plan ahead and allow 3-5 days for refill requests.    You will generally receive a reminder call the day prior to your appointment.  If you cannot attend your appointment, please cancel your appointment with as much notice as possible.  If there is a pattern of failure to present for your appointments, I cannot provide consistent, meaningful, ongoing care for you. It is very important to me that you come in for your care, so we can best assist you with your health care needs.    IMPORTANT:  Please note that it is my standard of practice to NOT participate in prescribing ongoing requested Narcotic Analgesic therapy, and/or participate in the prescribing of other controlled substances.  My nurse and I am happy to assist you with the process of referral for alternative pain management as needed, and other treatment modalities including but not limited to:  Physical Therapy, Physical Medicine and Rehab, Counseling, Chiropractic Care, Orthopedic Care, and non-narcotic medication management.     In the event that you need to be seen for emergent concerns and I am out of office,  please see one of my colleagues for acute concerns.  You may also present to  or ER.  I appreciate the opportunity to serve you and look forward to supporting your healthcare needs in the future. Please contact me with any questions or concerns that you may  have.    Sincerely,      Kenyatta Trujillo RN, PA-C   Results for orders placed or performed in visit on 05/18/22   Comprehensive metabolic panel     Status: Abnormal   Result Value Ref Range    Sodium 137 133 - 144 mmol/L    Potassium 3.9 3.4 - 5.3 mmol/L    Chloride 103 94 - 109 mmol/L    Carbon Dioxide (CO2) 27 20 - 32 mmol/L    Anion Gap 7 3 - 14 mmol/L    Urea Nitrogen 17 7 - 30 mg/dL    Creatinine 0.70 0.52 - 1.04 mg/dL    Calcium 9.1 8.5 - 10.1 mg/dL    Glucose 118 (H) 70 - 99 mg/dL    Alkaline Phosphatase 121 40 - 150 U/L    AST 17 0 - 45 U/L    ALT 21 0 - 50 U/L    Protein Total 7.1 6.8 - 8.8 g/dL    Albumin 3.7 3.4 - 5.0 g/dL    Bilirubin Total 0.5 0.2 - 1.3 mg/dL    GFR Estimate >90 >60 mL/min/1.73m2   Hemoglobin A1c     Status: Abnormal   Result Value Ref Range    Estimated Average Glucose 126 mg/dL    Hemoglobin A1C 6.0 (H) 0.0 - 5.6 %   Lipid Profile (Chol, Trig, HDL, LDL calc)     Status: Abnormal   Result Value Ref Range    Cholesterol 190 <200 mg/dL    Triglycerides 191 (H) <150 mg/dL    Direct Measure HDL 47 (L) >=50 mg/dL    LDL Cholesterol Calculated 105 (H) <=100 mg/dL    Non HDL Cholesterol 143 (H) <130 mg/dL    Patient Fasting > 8hrs? Yes     Narrative    Cholesterol  Desirable:  <200 mg/dL    Triglycerides  Normal:  Less than 150 mg/dL  Borderline High:  150-199 mg/dL  High:  200-499 mg/dL  Very High:  Greater than or equal to 500 mg/dL    Direct Measure HDL  Female:  Greater than or equal to 50 mg/dL   Male:  Greater than or equal to 40 mg/dL    LDL Cholesterol  Desirable:  <100mg/dL  Above Desirable:  100-129 mg/dL   Borderline High:  130-159 mg/dL   High:  160-189 mg/dL   Very High:  >= 190 mg/dL    Non HDL Cholesterol  Desirable:  130 mg/dL  Above Desirable:  130-159 mg/dL  Borderline High:  160-189 mg/dL  High:  190-219 mg/dL  Very High:  Greater than or equal to 220 mg/dL   CBC with platelets and differential     Status: None   Result Value Ref Range    WBC Count 4.2 4.0 - 11.0 10e3/uL     RBC Count 4.45 3.80 - 5.20 10e6/uL    Hemoglobin 14.1 11.7 - 15.7 g/dL    Hematocrit 41.1 35.0 - 47.0 %    MCV 92 78 - 100 fL    MCH 31.7 26.5 - 33.0 pg    MCHC 34.3 31.5 - 36.5 g/dL    RDW 13.3 10.0 - 15.0 %    Platelet Count 214 150 - 450 10e3/uL    % Neutrophils 51 %    % Lymphocytes 32 %    % Monocytes 11 %    % Eosinophils 4 %    % Basophils 1 %    % Immature Granulocytes 1 %    NRBCs per 100 WBC 0 <1 /100    Absolute Neutrophils 2.2 1.6 - 8.3 10e3/uL    Absolute Lymphocytes 1.3 0.8 - 5.3 10e3/uL    Absolute Monocytes 0.5 0.0 - 1.3 10e3/uL    Absolute Eosinophils 0.2 0.0 - 0.7 10e3/uL    Absolute Basophils 0.0 0.0 - 0.2 10e3/uL    Absolute Immature Granulocytes 0.0 <=0.4 10e3/uL    Absolute NRBCs 0.0 10e3/uL   Protein electrophoresis     Status: None (In process)    Narrative    The following orders were created for panel order Protein electrophoresis.  Procedure                               Abnormality         Status                     ---------                               -----------         ------                     Total Protein, Serum for...[[id number]]                      In process                 Protein Electrophoresis,...[[id number]]                      In process                   Please view results for these tests on the individual orders.   CBC with platelets and differential     Status: None    Narrative    The following orders were created for panel order CBC with platelets and differential.  Procedure                               Abnormality         Status                     ---------                               -----------         ------                     CBC with platelets and d...[[id number]]                      Final result                 Please view results for these tests on the individual orders.

## 2022-05-18 NOTE — NURSING NOTE
"Chief Complaint   Patient presents with     Medication Follow-up       Initial /74 (BP Location: Left arm, Patient Position: Sitting, Cuff Size: Adult Regular)   Pulse 81   Temp 97.7  F (36.5  C) (Tympanic)   Ht 1.6 m (5' 3\")   Wt 73.5 kg (162 lb)   SpO2 98%   BMI 28.70 kg/m   Estimated body mass index is 28.7 kg/m  as calculated from the following:    Height as of this encounter: 1.6 m (5' 3\").    Weight as of this encounter: 73.5 kg (162 lb).  Medication Reconciliation: complete  Marlyn Loo LPN  "

## 2022-05-19 LAB
ALBUMIN SERPL ELPH-MCNC: 4.2 G/DL (ref 3.7–5.1)
ALPHA1 GLOB SERPL ELPH-MCNC: 0.3 G/DL (ref 0.2–0.4)
ALPHA2 GLOB SERPL ELPH-MCNC: 0.8 G/DL (ref 0.5–0.9)
B-GLOBULIN SERPL ELPH-MCNC: 0.6 G/DL (ref 0.6–1)
B2 MICROGLOB TUMOR MARKER SER-MCNC: 3 MG/L
GAMMA GLOB SERPL ELPH-MCNC: 0.7 G/DL (ref 0.7–1.6)
IGA SERPL-MCNC: 17 MG/DL (ref 84–499)
IGG SERPL-MCNC: 767 MG/DL (ref 610–1616)
IGM SERPL-MCNC: 31 MG/DL (ref 35–242)
KAPPA LC FREE SER-MCNC: 0.69 MG/DL (ref 0.33–1.94)
KAPPA LC FREE/LAMBDA FREE SER NEPH: 2.46 {RATIO} (ref 0.26–1.65)
LAMBDA LC FREE SERPL-MCNC: 0.28 MG/DL (ref 0.57–2.63)
M PROTEIN SERPL ELPH-MCNC: 0.2 G/DL
PROT PATTERN SERPL ELPH-IMP: ABNORMAL
PROT PATTERN SERPL IFE-IMP: NORMAL
VISC SER: 1.4 CP (ref 1.4–1.8)

## 2022-05-19 PROCEDURE — 84165 PROTEIN E-PHORESIS SERUM: CPT | Mod: 26

## 2022-05-19 PROCEDURE — 86334 IMMUNOFIX E-PHORESIS SERUM: CPT

## 2022-05-20 NOTE — PROGRESS NOTES
Oncology Follow-up Visit    Reason for Visit:  Carmelita is a 71 year old woman with a diagnosis of multiple myeloma, who presents to the clinic today for consideration of ongoing therapy. She is in the midst of a treatment holiday.     Nursing Note and documentation reviewed: Yes    Interval History:   Carlene reports that she is doing well. She notes that her energy levels have increased since being on treatment holiday. She has overall felt well. Nice to be able to do some stuff like gardening, not having to worry about coming in. She did note some aches in her bilateral shins but later noted this to be during cold and rainy weather.     She denies signs of infection. No fever, chills, chest pain, cough, or SOB. No bleeding concerns. No nausea or vomiting. Good appetite. No bowel concerns. No skin concerns. Overall, doing and feeling well.     Oncologic History  12/31/2018 Presented to the emergency room with vertigo and fatigue.  CT scan of the head was negative and subsequent stress test was negative.  5/3/2019 PCP ordered lab work and noted a total protein of 12.9.  SPEP at that time showed an M spike of 6.2 with a large monoclonal protein seen in the gamma fraction. Urine immunofixation showed a possible small protein band in the gamma fraction  5/31/2019 Evaluated by Dr. Walker with Medical Oncology with plan to rule out myeloma and obtain bone marrow aspiration biopsy as well as a metastatic bone survey along with additional labwork  6/18/2019 Underwent bone marrow aspiration and biopsy  6/24/2019 Seen again by Dr. Walker and CBC showed a hemoglobin of 9.3, M spike 7.3 with monoclonal IgG immunoglobulin of kappa light chain type; serum viscosity was 2.9; quantitative immunoglobulins showed an IgG of 8160, beta-2 microglobulin was 5.8, BUN was 21 with creatinine is 0.8 and total protein was 13.7.  Quantitative kappa/lambda free light chains showed an elevated ratio of 17.0 bone marrow aspiration biopsy showed  plasma cell myeloma with approximately 80% plasma cells.  Immunofixation showed IgG kappa and flow cytometry revealed kappa monotypic plasma cells consistent with clonal plasma cell neoplasm and FISH panel was pending at that time.  It was felt she had at least stage II disease based on her beta-2 microglobulin and anemia.  Plan was to treat with Velcade 1.3 mg per/m2 days 1, 4, 8 and 11/Decadron 40 mg on days 1, 8 and 15. Initiation of Revlimid with C2 at 25 mg daily days 1 through 14.  Plan was to also obtain an MRI of the lumbar spine to rule out lytic lesion at L3.  She was initiated on Zovirax 400 mg p.o. twice daily.  6/25/2019 C1 of chemotherapy initiated  7/1/2019 Note in chart regarding patient's large co-pay for the Revlimid and no plan at this point to initiate Revlimid and treat only with Velcade and Decadron per Dr. Walker  7/11/2019 MRI lumbar spine shows a pathologic superior endplate compression fracture at L3 without evidence of retropulsed fragment and innumerable enhancing lesions throughout the lumbar spine consistent with history of multiple myeloma.  9/10/2019 Increasing M-spike and kappa lambda ratio  10/1/2019 Initiation of CyBorD  11/17/2020 Plateau of M-spike at 1.2 after 36 cycles of CyBorD  12/1/2020 Initiation of Darzalex Faspro injection  3/23/2021 M-spike increased form 1.0 to 1.2 and velcade and dexamethasone added to plan  4/12/2022 Treatment HOLIDAY     Current Chemo Regime/TX: **Treatment holiday** Darzalex faspro injection 1800mg subcutaneous per protocol on Day 1 with velcade 1.3mg/m2 on days 1, 4, 8, 11 with weekly Dexamethasone, given every 21 days      **Zometa 4mg every 3 months  Current Cycle: Treatment holiday  # of completed cycles:  22  **Velcade & Dex added C5     Previous treatment:   Velcade 1.3 mg/m2 days 1, 4, 8 and 11 with Decadron 40 mg days 1, 8 and 15 x 4 cycles;   Velcade 1.5mg.m2/cyclophosphamide 150mg every 7 days on days 1,8,15 and 22/decadron 40mg days  "1,8,15,22 ; Darzalex faspro injection 1800mg subcutaneous per protocol    Past Medical History:   Diagnosis Date     Arthritis      Cyst of breast      Depressive disorder      Diabetes mellitus, type 2 (H) 1/18/2021     Essential hypertension 10/1/2015     Major depressive disorder, recurrent episode, mild (H) 10/1/2015     Mixed hyperlipidemia 10/1/2015     Multiple myeloma not having achieved remission (H) 6/24/2019     Other specified disorders of bladder 07/09/2012    Irritable Bladder     Seasonal allergies 10/1/2015     Unspecified essential hypertension 03/19/2007     Unspecified sinusitis (chronic) 09/05/2007       Past Surgical History:   Procedure Laterality Date     APPENDECTOMY       BONE MARROW BIOPSY, BONE SPECIMEN, NEEDLE/TROCAR N/A 6/18/2019    Procedure: BONE MARROW BIOPSY;  Surgeon: Maciej Sanz MD;  Location: [facility]     CHOLECYSTECTOMY       COLONOSCOPY  07-    repeat 10 years     COLONOSCOPY N/A 12/30/2016    Procedure: COLONOSCOPY;  Surgeon: Bhaskar Franklin DO;  Location: [facility]     PUNC/ASPIR BREAST CYST Left 1995    benign     SINUS SURGERY       TUBAL STERILIZATION         Family History   Problem Relation Age of Onset     Breast Cancer Mother 60        Cause of Death     Parkinsonism Father         \"Possible\"     Coronary Artery Disease Father      Pacemaker Father      Thyroid Disease Daughter      Breast Cancer Maternal Aunt         over 50     Diabetes No family hx of      Hypertension No family hx of      Hyperlipidemia No family hx of      Cerebrovascular Disease No family hx of      Colon Cancer No family hx of      Prostate Cancer No family hx of      Genetic Disorder No family hx of      Asthma No family hx of      Anesthesia Reaction No family hx of        Social History     Socioeconomic History     Marital status:      Spouse name: Not on file     Number of children: Not on file     Years of education: Not on file     Highest education level: Not on file "   Occupational History     Occupation: Financial     Comment:  - ([employer])   Tobacco Use     Smoking status: Never Smoker     Smokeless tobacco: Never Used     Tobacco comment: Tried to Quit (YES); QUIT in 1971; Passive Exposure (NO)   Substance and Sexual Activity     Alcohol use: Yes     Comment: RARELY     Drug use: No     Sexual activity: Yes     Partners: Male     Birth control/protection: None   Other Topics Concern      Service Not Asked     Blood Transfusions Not Asked     Caffeine Concern Yes     Comment: Coffee >6 cups daily     Occupational Exposure Not Asked     Hobby Hazards Not Asked     Sleep Concern Not Asked     Stress Concern Not Asked     Weight Concern Not Asked     Special Diet Not Asked     Back Care Not Asked     Exercise Not Asked     Bike Helmet Not Asked     Seat Belt Not Asked     Self-Exams Not Asked     Parent/sibling w/ CABG, MI or angioplasty before 65F 55M? No   Social History Narrative     Not on file     Social Determinants of Health     Financial Resource Strain: Not on file   Food Insecurity: Not on file   Transportation Needs: Not on file   Physical Activity: Not on file   Stress: Not on file   Social Connections: Not on file   Intimate Partner Violence: Not on file   Housing Stability: Not on file       Current Outpatient Medications   Medication     acyclovir (ZOVIRAX) 400 MG tablet     aspirin (ASA) 81 MG chewable tablet     atorvastatin (LIPITOR) 10 MG tablet     cetirizine (ZYRTEC) 10 MG tablet     fluticasone (FLONASE) 50 MCG/ACT nasal spray     L-Lysine HCl 500 MG CAPS     losartan (COZAAR) 50 MG tablet     sertraline (ZOLOFT) 50 MG tablet     dexamethasone (DECADRON) 4 MG tablet     EPINEPHrine (EPIPEN) 0.3 MG/0.3ML injection     prochlorperazine (COMPAZINE) 10 MG tablet     No current facility-administered medications for this visit.        Allergies   Allergen Reactions     Lisinopril Cough     Phenylephrine Hcl Other (See Comments)      "**Entex  HEADACHE (SEVERE)     Phenylpropanolamine Other (See Comments)     **Entex  HEADACHE (SEVERE)     Pseudoephedrine Tannate Other (See Comments)     **Entex  HEADACHE (SEVERE)     Levofloxacin Rash     **Levaquin     Moxifloxacin Hcl [Avelox] Rash       Review Of Systems:  A complete review of systems is negative except for the above mentioned items in the interval history.     ECOG Performance Status: 0    Physical Exam:  /68   Pulse 79   Temp 98.1  F (36.7  C) (Tympanic)   Resp 19   Ht 1.6 m (5' 3\")   Wt 74.5 kg (164 lb 3.9 oz)   SpO2 98%   BMI 29.09 kg/m    GENERAL APPEARANCE: Healthy, alert and in no acute distress.  HEENT: Eyes appear normal without scleral icterus. Extraocular movements intact.  NECK:   Supple with normal range of motion. No asymmetry or masses.  LYMPHATICS: No palpable cervical, supraclavicular nodes.  RESP: Lungs clear to auscultation bilaterally, respirations regular and easy.  CARDIOVASCULAR: Regular rate and rhythm. Normal S1, S2; no murmur, gallop, or rub.  ABDOMEN: Soft, non-tender, non-distended. Bowel sounds auscultated all 4 quadrants. No palpable organomegaly or masses.  MUSCULOSKELETAL: Extremities without gross deformities noted. No edema of bilateral lower extremities.  SKIN: No suspicious lesions or rashes.  NEURO: Alert and oriented x 3.  Gait steady.  PSYCHIATRIC: Mentation and affect appear normal.  Mood appropriate.    Laboratory:   Component      Latest Ref Rng & Units 5/18/2022   WBC      4.0 - 11.0 10e3/uL 4.2   RBC Count      3.80 - 5.20 10e6/uL 4.45   Hemoglobin      11.7 - 15.7 g/dL 14.1   Hematocrit      35.0 - 47.0 % 41.1   MCV      78 - 100 fL 92   MCH      26.5 - 33.0 pg 31.7   MCHC      31.5 - 36.5 g/dL 34.3   RDW      10.0 - 15.0 % 13.3   Platelet Count      150 - 450 10e3/uL 214   % Neutrophils      % 51   % Lymphocytes      % 32   % Monocytes      % 11   % Eosinophils      % 4   % Basophils      % 1   % Immature Granulocytes      % 1   NRBCs " per 100 WBC      <1 /100 0   Absolute Neutrophils      1.6 - 8.3 10e3/uL 2.2   Absolute Lymphocytes      0.8 - 5.3 10e3/uL 1.3   Absolute Monocytes      0.0 - 1.3 10e3/uL 0.5   Absolute Eosinophils      0.0 - 0.7 10e3/uL 0.2   Absolute Basophils      0.0 - 0.2 10e3/uL 0.0   Absolute Immature Granulocytes      <=0.4 10e3/uL 0.0   Absolute NRBCs      10e3/uL 0.0   Sodium      133 - 144 mmol/L 137   Potassium      3.4 - 5.3 mmol/L 3.9   Chloride      94 - 109 mmol/L 103   Carbon Dioxide      20 - 32 mmol/L 27   Anion Gap      3 - 14 mmol/L 7   Urea Nitrogen      7 - 30 mg/dL 17   Creatinine      0.52 - 1.04 mg/dL 0.70   Calcium      8.5 - 10.1 mg/dL 9.1   Glucose      70 - 99 mg/dL 118 (H)   Alkaline Phosphatase      40 - 150 U/L 121   AST      0 - 45 U/L 17   ALT      0 - 50 U/L 21   Protein Total      6.8 - 8.8 g/dL 7.1   Albumin      3.4 - 5.0 g/dL 3.7   Bilirubin Total      0.2 - 1.3 mg/dL 0.5   GFR Estimate      >60 mL/min/1.73m2 >90   Albumin Fraction      3.7 - 5.1 g/dL 4.2   Alpha 1 Fraction      0.2 - 0.4 g/dL 0.3   Alpha 2 Fraction      0.5 - 0.9 g/dL 0.8   Beta Fraction      0.6 - 1.0 g/dL 0.6   Gamma Fraction      0.7 - 1.6 g/dL 0.7   Monoclonal Peak      <=0.0 g/dL 0.2 (H)   ELP Interpretation:       Small monoclonal protein (0.2 g/dL) seen in the gamma fraction. See immunofixation report on same specimen. Pathologic significance requires clinical correlation. Basim Tomlinson M.D., Ph.D., Pathologist.   IGG      610 - 1,616 mg/dL 767   IGA      84 - 499 mg/dL 17 (L)   IGM      35 - 242 mg/dL 31 (L)   [place] Free Lt Chain      0.33 - 1.94 mg/dL 0.69   Lambda Free Lt Chain      0.57 - 2.63 mg/dL 0.28 (L)   Kappa Lambda Ratio      0.26 - 1.65 2.46 (H)   Beta-2-Microglobulin      <2.3 mg/L 3.0 (H)   Immunofixation ELP       Monoclonal IgG immunoglobulin of kappa light chain type. Monoclonal antibody therapeutics (e.g. Daratumumab) may appear as monoclonal proteins on serum electrophoresis and  immunofixation. Results should be interpreted with caution Pathologic significance requires clinical correlation. Basim Tomlinson M.D., Ph.D., Pathologist   Viscosity Index      1.4 - 1.8 1.4   Total Protein Serum for ELP      6.8 - 8.8 g/dL 6.6 (L)     Imaging Studies:  None this visit.     ASSESSMENT/PLAN:  #1 Multiple myeloma:   IgG kappa multiple myeloma with 80% involvement of the bone marrow diagnosed June 2019 initially treated with Velcade and Decadron and received 4 cycles with increasing M-spike and kappa/lambda ratio.  Treatment changed to CyBorD on 10/1/2019.  M-spike plateau at 1.2 and treatment changed to monotherapy with Darzalex Faspro injection. M spike declined to 1.0 then increased again to 1.2 so Velcade and Dex added to her treatment plan with cycle 5 therapy. Following C22, she did initiate a treatment holiday. She presents today following a 6 week holiday. Her mspike has notable gone down from 0.3 to 0.2. Other labs stable. We will continue with treatment holiday. Will recheck labs in 6 weeks and follow-up afterwards.      #2 Lytic lesions: She remains on calcium with vitamin D twice a day. Last zometa April 2022.     #3 At risk for Covid Patient received Booster #2 about 4 weeks ago. Would be good candidate for Evusheld. Educated patient on this and she would like to proceed while on treatment holiday.     Patient in agreement with plan and verbalizes understanding. Agrees to call with any questions or concerns.    38 minutes spent in the patient's encounter today with time spent in review of patient's chart along with chart preparation and review of the treatment plan and signing of treatment plan.  Time was also spent with the patient in obtaining a review of systems and performing a physical exam along with detailed review of all test results. Time was also spent in discussing plan for future follow-up and relating instructions for follow-up and in placing future orders.    Nida EARL  FLO Gonzalez, FNP-C

## 2022-05-24 ENCOUNTER — ONCOLOGY VISIT (OUTPATIENT)
Dept: ONCOLOGY | Facility: OTHER | Age: 72
End: 2022-05-24
Attending: NURSE PRACTITIONER
Payer: MEDICARE

## 2022-05-24 ENCOUNTER — LAB (OUTPATIENT)
Dept: LAB | Facility: OTHER | Age: 72
End: 2022-05-24
Attending: NURSE PRACTITIONER
Payer: COMMERCIAL

## 2022-05-24 ENCOUNTER — INFUSION THERAPY VISIT (OUTPATIENT)
Dept: INFUSION THERAPY | Facility: OTHER | Age: 72
End: 2022-05-24
Attending: INTERNAL MEDICINE
Payer: MEDICARE

## 2022-05-24 VITALS
DIASTOLIC BLOOD PRESSURE: 68 MMHG | SYSTOLIC BLOOD PRESSURE: 116 MMHG | WEIGHT: 164.24 LBS | OXYGEN SATURATION: 98 % | RESPIRATION RATE: 19 BRPM | HEART RATE: 79 BPM | BODY MASS INDEX: 29.1 KG/M2 | HEIGHT: 63 IN | TEMPERATURE: 98.1 F

## 2022-05-24 DIAGNOSIS — C90.00 MULTIPLE MYELOMA NOT HAVING ACHIEVED REMISSION (H): Primary | ICD-10-CM

## 2022-05-24 DIAGNOSIS — Z53.9 ERRONEOUS ENCOUNTER--DISREGARD: Primary | ICD-10-CM

## 2022-05-24 LAB
ALBUMIN SERPL-MCNC: 3.5 G/DL (ref 3.4–5)
ALP SERPL-CCNC: 117 U/L (ref 40–150)
ALT SERPL W P-5'-P-CCNC: 22 U/L (ref 0–50)
ANION GAP SERPL CALCULATED.3IONS-SCNC: 5 MMOL/L (ref 3–14)
AST SERPL W P-5'-P-CCNC: 18 U/L (ref 0–45)
BASOPHILS # BLD AUTO: 0 10E3/UL (ref 0–0.2)
BASOPHILS NFR BLD AUTO: 1 %
BILIRUB SERPL-MCNC: 0.4 MG/DL (ref 0.2–1.3)
BUN SERPL-MCNC: 21 MG/DL (ref 7–30)
CALCIUM SERPL-MCNC: 9.1 MG/DL (ref 8.5–10.1)
CHLORIDE BLD-SCNC: 105 MMOL/L (ref 94–109)
CO2 SERPL-SCNC: 26 MMOL/L (ref 20–32)
CREAT SERPL-MCNC: 0.75 MG/DL (ref 0.52–1.04)
EOSINOPHIL # BLD AUTO: 0.2 10E3/UL (ref 0–0.7)
EOSINOPHIL NFR BLD AUTO: 4 %
ERYTHROCYTE [DISTWIDTH] IN BLOOD BY AUTOMATED COUNT: 13.5 % (ref 10–15)
GFR SERPL CREATININE-BSD FRML MDRD: 84 ML/MIN/1.73M2
GLUCOSE BLD-MCNC: 113 MG/DL (ref 70–99)
HCT VFR BLD AUTO: 40.7 % (ref 35–47)
HGB BLD-MCNC: 13.7 G/DL (ref 11.7–15.7)
IMM GRANULOCYTES # BLD: 0 10E3/UL
IMM GRANULOCYTES NFR BLD: 1 %
LYMPHOCYTES # BLD AUTO: 1.5 10E3/UL (ref 0.8–5.3)
LYMPHOCYTES NFR BLD AUTO: 34 %
MCH RBC QN AUTO: 30.8 PG (ref 26.5–33)
MCHC RBC AUTO-ENTMCNC: 33.7 G/DL (ref 31.5–36.5)
MCV RBC AUTO: 92 FL (ref 78–100)
MONOCYTES # BLD AUTO: 0.6 10E3/UL (ref 0–1.3)
MONOCYTES NFR BLD AUTO: 14 %
NEUTROPHILS # BLD AUTO: 2.1 10E3/UL (ref 1.6–8.3)
NEUTROPHILS NFR BLD AUTO: 46 %
NRBC # BLD AUTO: 0 10E3/UL
NRBC BLD AUTO-RTO: 0 /100
PLATELET # BLD AUTO: 205 10E3/UL (ref 150–450)
POTASSIUM BLD-SCNC: 4 MMOL/L (ref 3.4–5.3)
PROT SERPL-MCNC: 6.8 G/DL (ref 6.8–8.8)
RBC # BLD AUTO: 4.45 10E6/UL (ref 3.8–5.2)
SODIUM SERPL-SCNC: 136 MMOL/L (ref 133–144)
WBC # BLD AUTO: 4.3 10E3/UL (ref 4–11)

## 2022-05-24 PROCEDURE — 36415 COLL VENOUS BLD VENIPUNCTURE: CPT | Mod: ZL | Performed by: NURSE PRACTITIONER

## 2022-05-24 PROCEDURE — 80053 COMPREHEN METABOLIC PANEL: CPT | Mod: ZL | Performed by: NURSE PRACTITIONER

## 2022-05-24 PROCEDURE — G0463 HOSPITAL OUTPT CLINIC VISIT: HCPCS

## 2022-05-24 PROCEDURE — 99214 OFFICE O/P EST MOD 30 MIN: CPT | Performed by: NURSE PRACTITIONER

## 2022-05-24 PROCEDURE — 85004 AUTOMATED DIFF WBC COUNT: CPT | Mod: ZL | Performed by: NURSE PRACTITIONER

## 2022-05-24 ASSESSMENT — PAIN SCALES - GENERAL: PAINLEVEL: NO PAIN (0)

## 2022-05-24 NOTE — NURSING NOTE
"Oncology Rooming Note    May 24, 2022 12:46 PM   Carmelita Watts is a 71 year old female who presents for:    Chief Complaint   Patient presents with     Oncology Clinic Visit     Follow up. Multiple Myeloma     Initial Vitals: /68   Pulse 79   Temp 98.1  F (36.7  C) (Tympanic)   Resp 19   Ht 1.6 m (5' 3\")   Wt 74.5 kg (164 lb 3.9 oz)   SpO2 98%   BMI 29.09 kg/m   Estimated body mass index is 29.09 kg/m  as calculated from the following:    Height as of this encounter: 1.6 m (5' 3\").    Weight as of this encounter: 74.5 kg (164 lb 3.9 oz). Body surface area is 1.82 meters squared.  No Pain (0) Comment: Data Unavailable   No LMP recorded. Patient is postmenopausal.  Allergies reviewed: Yes  Medications reviewed: Yes    Medications: Medication refills not needed today.  Pharmacy name entered into [facility]:    [id number] - [place], [place] - [address]  AT [facility] [address]  [facility] - [place], [place] - [address]  [facility] - [place] - [address]              "

## 2022-05-24 NOTE — PATIENT INSTRUCTIONS
Thank you for allowing me to be a part of your care today.    We are going to continue with a treatment holiday for the next 6 weeks. We will cancel appointments and plan on seeing you back in 6 weeks with labs the week prior. I do want to get you scheduled for Evusheld the coming weeks.     If you have any questions please call [phone]    Other instructions:      None

## 2022-05-27 ENCOUNTER — INFUSION THERAPY VISIT (OUTPATIENT)
Dept: INFUSION THERAPY | Facility: OTHER | Age: 72
End: 2022-05-27
Attending: INTERNAL MEDICINE
Payer: MEDICARE

## 2022-05-27 DIAGNOSIS — C90.00 MULTIPLE MYELOMA NOT HAVING ACHIEVED REMISSION (H): Primary | ICD-10-CM

## 2022-05-27 PROCEDURE — 250N000011 HC RX IP 250 OP 636: Performed by: NURSE PRACTITIONER

## 2022-05-27 PROCEDURE — 96372 THER/PROPH/DIAG INJ SC/IM: CPT | Performed by: NURSE PRACTITIONER

## 2022-05-27 PROCEDURE — M0220 HC INJECTION TIXAGEVIMAB & CILGAVIMAB (EVUSHELD): HCPCS | Performed by: NURSE PRACTITIONER

## 2022-05-27 RX ADMIN — AZD7442 6 ML: KIT at 11:31

## 2022-05-27 NOTE — PROGRESS NOTES
Evusheld Consent   Confirmed patient received the Emergency Use Authorization Fact Sheet for Patients, Parents and Caregivers prior to receiving medication. We discussed the following risks and benefits of receiving EVUSHELD, as well as alternative treatments and the patient wished to proceed with EVUSHELD.     Providers believe the benefits of Evusheld outweigh the risks for all moderately to severely immunocompromised patients.      Benefits:   Evusheld is a synthetic antibody that provides protection against COVID-19 for 6 months and is recommended for patients that have a weakened immune system that may not be able to make antibodies themselves.     Risks:    There is a very small risk of allergic reactions and you should notify us if you have any symptoms of an allergic reaction after the injection. There were also some extremely rare cardiac events reported in the initial trials in people that already had heart disease, but experts do not think these were caused by the medication. We will observe you for any chest pain or trouble breathing after the injections and you can reach out to your provider if any of these symptoms develop.     Alternatives:   Vaccines to prevent COVID-19 are approved or available under Emergency Use Authorization. Use of EVUSHELD does not replace vaccination against COVID-19. You can continue to mask and isolate to avoid infections. It is your choice to receive or not receive EVUSHELD. Should you decide not to receive EVUSHELD, it will not change your standard medical care.     Patient does consent to the injection.

## 2022-05-27 NOTE — PROGRESS NOTES
JYOTI Administration Note:  Carmelita Watts presents today for EVUSHRAFAL.   present during visit today: Not Applicable.    Consent:   Informed Consent confirmed in chart, obtained on this date: today     Post Injection Assessment:   Patient tolerated injection without incident.  Patient observed for 60 minutes post injection per protocol.      Patient was observed in the room for a minimum of 10 minutes after injection per standard, then remained in the buidling for a total 60 minute observation period.         Discharge Plan:    Patient and/or family verbalized understanding of discharge instructions and all questions answered.     .memd

## 2022-06-17 ENCOUNTER — TRANSFERRED RECORDS (OUTPATIENT)
Dept: HEALTH INFORMATION MANAGEMENT | Facility: CLINIC | Age: 72
End: 2022-06-17

## 2022-07-05 ENCOUNTER — LAB (OUTPATIENT)
Dept: LAB | Facility: OTHER | Age: 72
End: 2022-07-05
Payer: MEDICARE

## 2022-07-05 DIAGNOSIS — C90.00 MULTIPLE MYELOMA NOT HAVING ACHIEVED REMISSION (H): ICD-10-CM

## 2022-07-05 LAB
ALBUMIN SERPL-MCNC: 3.7 G/DL (ref 3.4–5)
ALP SERPL-CCNC: 132 U/L (ref 40–150)
ALT SERPL W P-5'-P-CCNC: 21 U/L (ref 0–50)
ANION GAP SERPL CALCULATED.3IONS-SCNC: 6 MMOL/L (ref 3–14)
AST SERPL W P-5'-P-CCNC: 16 U/L (ref 0–45)
BASOPHILS # BLD AUTO: 0 10E3/UL (ref 0–0.2)
BASOPHILS NFR BLD AUTO: 1 %
BILIRUB SERPL-MCNC: 0.4 MG/DL (ref 0.2–1.3)
BUN SERPL-MCNC: 18 MG/DL (ref 7–30)
CALCIUM SERPL-MCNC: 8.7 MG/DL (ref 8.5–10.1)
CHLORIDE BLD-SCNC: 104 MMOL/L (ref 94–109)
CO2 SERPL-SCNC: 26 MMOL/L (ref 20–32)
CREAT SERPL-MCNC: 0.7 MG/DL (ref 0.52–1.04)
EOSINOPHIL # BLD AUTO: 0.2 10E3/UL (ref 0–0.7)
EOSINOPHIL NFR BLD AUTO: 4 %
ERYTHROCYTE [DISTWIDTH] IN BLOOD BY AUTOMATED COUNT: 13.2 % (ref 10–15)
GFR SERPL CREATININE-BSD FRML MDRD: >90 ML/MIN/1.73M2
GLUCOSE BLD-MCNC: 117 MG/DL (ref 70–99)
HCT VFR BLD AUTO: 43.9 % (ref 35–47)
HGB BLD-MCNC: 14.6 G/DL (ref 11.7–15.7)
IMM GRANULOCYTES # BLD: 0 10E3/UL
IMM GRANULOCYTES NFR BLD: 0 %
LYMPHOCYTES # BLD AUTO: 1.5 10E3/UL (ref 0.8–5.3)
LYMPHOCYTES NFR BLD AUTO: 32 %
MCH RBC QN AUTO: 30.9 PG (ref 26.5–33)
MCHC RBC AUTO-ENTMCNC: 33.3 G/DL (ref 31.5–36.5)
MCV RBC AUTO: 93 FL (ref 78–100)
MONOCYTES # BLD AUTO: 0.5 10E3/UL (ref 0–1.3)
MONOCYTES NFR BLD AUTO: 11 %
NEUTROPHILS # BLD AUTO: 2.3 10E3/UL (ref 1.6–8.3)
NEUTROPHILS NFR BLD AUTO: 52 %
NRBC # BLD AUTO: 0 10E3/UL
NRBC BLD AUTO-RTO: 0 /100
PLATELET # BLD AUTO: 240 10E3/UL (ref 150–450)
POTASSIUM BLD-SCNC: 3.7 MMOL/L (ref 3.4–5.3)
PROT SERPL-MCNC: 7.1 G/DL (ref 6.8–8.8)
RBC # BLD AUTO: 4.72 10E6/UL (ref 3.8–5.2)
SODIUM SERPL-SCNC: 136 MMOL/L (ref 133–144)
TOTAL PROTEIN SERUM FOR ELP: 6.6 G/DL (ref 6.4–8.3)
WBC # BLD AUTO: 4.5 10E3/UL (ref 4–11)

## 2022-07-05 PROCEDURE — 84165 PROTEIN E-PHORESIS SERUM: CPT | Mod: ZL | Performed by: PATHOLOGY

## 2022-07-05 PROCEDURE — 82784 ASSAY IGA/IGD/IGG/IGM EACH: CPT | Mod: ZL

## 2022-07-05 PROCEDURE — 82310 ASSAY OF CALCIUM: CPT | Mod: ZL

## 2022-07-05 PROCEDURE — 82232 ASSAY OF BETA-2 PROTEIN: CPT | Mod: ZL

## 2022-07-05 PROCEDURE — 36415 COLL VENOUS BLD VENIPUNCTURE: CPT | Mod: ZL

## 2022-07-05 PROCEDURE — 85025 COMPLETE CBC W/AUTO DIFF WBC: CPT | Mod: ZL

## 2022-07-05 PROCEDURE — 84155 ASSAY OF PROTEIN SERUM: CPT | Mod: ZL

## 2022-07-05 PROCEDURE — 86334 IMMUNOFIX E-PHORESIS SERUM: CPT | Mod: ZL | Performed by: PATHOLOGY

## 2022-07-05 PROCEDURE — 83521 IG LIGHT CHAINS FREE EACH: CPT | Mod: ZL

## 2022-07-05 PROCEDURE — 85810 BLOOD VISCOSITY EXAMINATION: CPT | Mod: ZL

## 2022-07-06 LAB
ALBUMIN SERPL ELPH-MCNC: 4.2 G/DL (ref 3.7–5.1)
ALPHA1 GLOB SERPL ELPH-MCNC: 0.3 G/DL (ref 0.2–0.4)
ALPHA2 GLOB SERPL ELPH-MCNC: 0.8 G/DL (ref 0.5–0.9)
B-GLOBULIN SERPL ELPH-MCNC: 0.6 G/DL (ref 0.6–1)
GAMMA GLOB SERPL ELPH-MCNC: 0.7 G/DL (ref 0.7–1.6)
IGA SERPL-MCNC: 16 MG/DL (ref 84–499)
IGG SERPL-MCNC: 831 MG/DL (ref 610–1616)
IGM SERPL-MCNC: 19 MG/DL (ref 35–242)
KAPPA LC FREE SER-MCNC: 0.69 MG/DL (ref 0.33–1.94)
KAPPA LC FREE/LAMBDA FREE SER NEPH: 2.38 {RATIO} (ref 0.26–1.65)
LAMBDA LC FREE SERPL-MCNC: 0.29 MG/DL (ref 0.57–2.63)
M PROTEIN SERPL ELPH-MCNC: 0.3 G/DL
PROT PATTERN SERPL ELPH-IMP: ABNORMAL
PROT PATTERN SERPL IFE-IMP: NORMAL
VISC SER: 1.5 CP (ref 1.4–1.8)

## 2022-07-06 PROCEDURE — 84165 PROTEIN E-PHORESIS SERUM: CPT | Mod: 26 | Performed by: PATHOLOGY

## 2022-07-06 PROCEDURE — 86334 IMMUNOFIX E-PHORESIS SERUM: CPT | Performed by: PATHOLOGY

## 2022-07-06 NOTE — PROGRESS NOTES
Oncology Follow-up Visit    Reason for Visit:  Carmelita is a 71 year old woman with a diagnosis of multiple myeloma, who presents to the clinic today for consideration of ongoing therapy. She is in the midst of a treatment holiday.     Nursing Note and documentation reviewed: Yes    Interval History:   Carlene feels she is doing well. She notes that she is just enjoying her time off treatment, trying to get out more, and do more. She is feeling well. She is trying daily to walk and least 1 mile or bike 5 miles. She has noticed some discomfort in her hips (L>R) but nothing that some Tylenol doesn't help. Denies signs of infection. No fever, chills, chest pain, cough, or SOB. No bleeding concerns. No nausea. Appetite has been good, although eating healthier and less which is common for her in the summer. Weight down a couple lbs. Has noticed intermittent skin bumps that resolve eventually but wondering if she should be concerned.     Oncologic History  12/31/2018 Presented to the emergency room with vertigo and fatigue.  CT scan of the head was negative and subsequent stress test was negative.  5/3/2019 PCP ordered lab work and noted a total protein of 12.9.  SPEP at that time showed an M spike of 6.2 with a large monoclonal protein seen in the gamma fraction. Urine immunofixation showed a possible small protein band in the gamma fraction  5/31/2019 Evaluated by Dr. Walker with Medical Oncology with plan to rule out myeloma and obtain bone marrow aspiration biopsy as well as a metastatic bone survey along with additional labwork  6/18/2019 Underwent bone marrow aspiration and biopsy  6/24/2019 Seen again by Dr. Walker and CBC showed a hemoglobin of 9.3, M spike 7.3 with monoclonal IgG immunoglobulin of kappa light chain type; serum viscosity was 2.9; quantitative immunoglobulins showed an IgG of 8160, beta-2 microglobulin was 5.8, BUN was 21 with creatinine is 0.8 and total protein was 13.7.  Quantitative kappa/lambda  free light chains showed an elevated ratio of 17.0 bone marrow aspiration biopsy showed plasma cell myeloma with approximately 80% plasma cells.  Immunofixation showed IgG kappa and flow cytometry revealed kappa monotypic plasma cells consistent with clonal plasma cell neoplasm and FISH panel was pending at that time.  It was felt she had at least stage II disease based on her beta-2 microglobulin and anemia.  Plan was to treat with Velcade 1.3 mg per/m2 days 1, 4, 8 and 11/Decadron 40 mg on days 1, 8 and 15. Initiation of Revlimid with C2 at 25 mg daily days 1 through 14.  Plan was to also obtain an MRI of the lumbar spine to rule out lytic lesion at L3.  She was initiated on Zovirax 400 mg p.o. twice daily.  6/25/2019 C1 of chemotherapy initiated  7/1/2019 Note in chart regarding patient's large co-pay for the Revlimid and no plan at this point to initiate Revlimid and treat only with Velcade and Decadron per Dr. Walker  7/11/2019 MRI lumbar spine shows a pathologic superior endplate compression fracture at L3 without evidence of retropulsed fragment and innumerable enhancing lesions throughout the lumbar spine consistent with history of multiple myeloma.  9/10/2019 Increasing M-spike and kappa lambda ratio  10/1/2019 Initiation of CyBorD  11/17/2020 Plateau of M-spike at 1.2 after 36 cycles of CyBorD  12/1/2020 Initiation of Darzalex Faspro injection  3/23/2021 M-spike increased form 1.0 to 1.2 and velcade and dexamethasone added to plan  4/12/2022 Treatment HOLIDAY commenced with Mspike 0.3.   05/27/2022 Received Evusheld while on treatment holiday.      Current Chemo Regime/TX: Darzalex faspro injection 1800mg subcutaneous per protocol on Day 1 with velcade 1.3mg/m2 on days 1, 4, 8, 11 with weekly Dexamethasone, given every 21 days      **Zometa 4mg every 3 months  Current Cycle: Treatment holiday  # of completed cycles:  22  **Velcade & Dex added C5     Previous treatment:   Velcade 1.3 mg/m2 days 1, 4, 8  "and 11 with Decadron 40 mg days 1, 8 and 15 x 4 cycles;   Velcade 1.5mg.m2/cyclophosphamide 150mg every 7 days on days 1,8,15 and 22/decadron 40mg days 1,8,15,22 ; Darzalex faspro injection 1800mg subcutaneous per protocol    Past Medical History:   Diagnosis Date     Arthritis      Cyst of breast      Depressive disorder      Diabetes mellitus, type 2 (H) 1/18/2021     Essential hypertension 10/1/2015     Major depressive disorder, recurrent episode, mild (H) 10/1/2015     Mixed hyperlipidemia 10/1/2015     Multiple myeloma not having achieved remission (H) 6/24/2019     Other specified disorders of bladder 07/09/2012    Irritable Bladder     Seasonal allergies 10/1/2015     Unspecified essential hypertension 03/19/2007     Unspecified sinusitis (chronic) 09/05/2007       Past Surgical History:   Procedure Laterality Date     APPENDECTOMY       BONE MARROW BIOPSY, BONE SPECIMEN, NEEDLE/TROCAR N/A 6/18/2019    Procedure: BONE MARROW BIOPSY;  Surgeon: Maciej Sanz MD;  Location: [facility]     CHOLECYSTECTOMY       COLONOSCOPY  07-    repeat 10 years     COLONOSCOPY N/A 12/30/2016    Procedure: COLONOSCOPY;  Surgeon: Bhaskar Franklin DO;  Location: [facility] OR     PUNC/ASPIR BREAST CYST Left 1995    benign     SINUS SURGERY       TUBAL STERILIZATION         Family History   Problem Relation Age of Onset     Breast Cancer Mother 60        Cause of Death     Parkinsonism Father         \"Possible\"     Coronary Artery Disease Father      Pacemaker Father      Thyroid Disease Daughter      Breast Cancer Maternal Aunt         over 50     Diabetes No family hx of      Hypertension No family hx of      Hyperlipidemia No family hx of      Cerebrovascular Disease No family hx of      Colon Cancer No family hx of      Prostate Cancer No family hx of      Genetic Disorder No family hx of      Asthma No family hx of      Anesthesia Reaction No family hx of        Social History     Socioeconomic History     Marital status: "      Spouse name: Not on file     Number of children: Not on file     Years of education: Not on file     Highest education level: Not on file   Occupational History     Occupation: Financial     Comment:  - ([employer])   Tobacco Use     Smoking status: Never Smoker     Smokeless tobacco: Never Used     Tobacco comment: Tried to Quit (YES); QUIT in 1971; Passive Exposure (NO)   Substance and Sexual Activity     Alcohol use: Yes     Comment: RARELY     Drug use: No     Sexual activity: Yes     Partners: Male     Birth control/protection: None   Other Topics Concern      Service Not Asked     Blood Transfusions Not Asked     Caffeine Concern Yes     Comment: Coffee >6 cups daily     Occupational Exposure Not Asked     Hobby Hazards Not Asked     Sleep Concern Not Asked     Stress Concern Not Asked     Weight Concern Not Asked     Special Diet Not Asked     Back Care Not Asked     Exercise Not Asked     Bike Helmet Not Asked     Seat Belt Not Asked     Self-Exams Not Asked     Parent/sibling w/ CABG, MI or angioplasty before 65F 55M? No   Social History Narrative     Not on file     Social Determinants of Health     Financial Resource Strain: Not on file   Food Insecurity: Not on file   Transportation Needs: Not on file   Physical Activity: Not on file   Stress: Not on file   Social Connections: Not on file   Intimate Partner Violence: Not on file   Housing Stability: Not on file       Current Outpatient Medications   Medication     acyclovir (ZOVIRAX) 400 MG tablet     aspirin (ASA) 81 MG chewable tablet     atorvastatin (LIPITOR) 10 MG tablet     cetirizine (ZYRTEC) 10 MG tablet     fluticasone (FLONASE) 50 MCG/ACT nasal spray     L-Lysine HCl 500 MG CAPS     losartan (COZAAR) 50 MG tablet     sertraline (ZOLOFT) 50 MG tablet     dexamethasone (DECADRON) 4 MG tablet     EPINEPHrine (EPIPEN) 0.3 MG/0.3ML injection     prochlorperazine (COMPAZINE) 10 MG tablet     No current facility-administered  "medications for this visit.        Allergies   Allergen Reactions     Lisinopril Cough     Phenylephrine Hcl Other (See Comments)     **Entex  HEADACHE (SEVERE)     Phenylpropanolamine Other (See Comments)     **Entex  HEADACHE (SEVERE)     Pseudoephedrine Tannate Other (See Comments)     **Entex  HEADACHE (SEVERE)     Levofloxacin Rash     **Levaquin     Moxifloxacin Hcl [Avelox] Rash       Review Of Systems:  A complete review of systems is negative except for the above mentioned items in the interval history.     ECOG Performance Status: 0    Physical Exam:  /72   Pulse 77   Temp 98  F (36.7  C) (Tympanic)   Resp 19   Ht 1.6 m (5' 3\")   Wt 72.8 kg (160 lb 7.9 oz)   SpO2 96%   BMI 28.43 kg/m    GENERAL APPEARANCE: Healthy, alert and in no acute distress.  HEENT: Eyes appear normal without scleral icterus. Extraocular movements intact.  NECK:   Supple with normal range of motion. No asymmetry or masses.  LYMPHATICS: No palpable cervical, supraclavicular nodes.  RESP: Lungs clear to auscultation bilaterally, respirations regular and easy.  CARDIOVASCULAR: Regular rate and rhythm. Normal S1, S2; no murmur, gallop, or rub.  ABDOMEN: Soft, non-tender, non-distended. Bowel sounds auscultated all 4 quadrants. No palpable organomegaly or masses.  MUSCULOSKELETAL: Extremities without gross deformities noted. No edema of bilateral lower extremities.  SKIN: No suspicious rashes. Has 2 red bumps, about 2-3 mm in diameter, one on stomach, one on leg (almost like bug bites).   NEURO: Alert and oriented x 3.  Gait steady.  PSYCHIATRIC: Mentation and affect appear normal.  Mood appropriate.    Laboratory:   Component      Latest Ref Rng & Units 7/5/2022   WBC      4.0 - 11.0 10e3/uL 4.5   RBC Count      3.80 - 5.20 10e6/uL 4.72   Hemoglobin      11.7 - 15.7 g/dL 14.6   Hematocrit      35.0 - 47.0 % 43.9   MCV      78 - 100 fL 93   MCH      26.5 - 33.0 pg 30.9   MCHC      31.5 - 36.5 g/dL 33.3   RDW      10.0 - 15.0 % " 13.2   Platelet Count      150 - 450 10e3/uL 240   % Neutrophils      % 52   % Lymphocytes      % 32   % Monocytes      % 11   % Eosinophils      % 4   % Basophils      % 1   % Immature Granulocytes      % 0   NRBCs per 100 WBC      <1 /100 0   Absolute Neutrophils      1.6 - 8.3 10e3/uL 2.3   Absolute Lymphocytes      0.8 - 5.3 10e3/uL 1.5   Absolute Monocytes      0.0 - 1.3 10e3/uL 0.5   Absolute Eosinophils      0.0 - 0.7 10e3/uL 0.2   Absolute Basophils      0.0 - 0.2 10e3/uL 0.0   Absolute Immature Granulocytes      <=0.4 10e3/uL 0.0   Absolute NRBCs      10e3/uL 0.0   Sodium      133 - 144 mmol/L 136   Potassium      3.4 - 5.3 mmol/L 3.7   Chloride      94 - 109 mmol/L 104   Carbon Dioxide      20 - 32 mmol/L 26   Anion Gap      3 - 14 mmol/L 6   Urea Nitrogen      7 - 30 mg/dL 18   Creatinine      0.52 - 1.04 mg/dL 0.70   Calcium      8.5 - 10.1 mg/dL 8.7   Glucose      70 - 99 mg/dL 117 (H)   Alkaline Phosphatase      40 - 150 U/L 132   AST      0 - 45 U/L 16   ALT      0 - 50 U/L 21   Protein Total      6.8 - 8.8 g/dL 7.1   Albumin      3.4 - 5.0 g/dL 3.7   Bilirubin Total      0.2 - 1.3 mg/dL 0.4   GFR Estimate      >60 mL/min/1.73m2 >90   Albumin Fraction      3.7 - 5.1 g/dL 4.2   Alpha 1 Fraction      0.2 - 0.4 g/dL 0.3   Alpha 2 Fraction      0.5 - 0.9 g/dL 0.8   Beta Fraction      0.6 - 1.0 g/dL 0.6   Gamma Fraction      0.7 - 1.6 g/dL 0.7   Monoclonal Peak      <=0.0 g/dL 0.3 (H)   ELP Interpretation:       Small monoclonal protein (about 0.3 g/dL) seen in the gamma fraction. See immunofixation report on same specimen. Pathologic significance requires clinical correlation. BIA Izaguirre M.D., Ph.D., Pathologist ().   IGG      610 - 1,616 mg/dL 831   IGA      84 - 499 mg/dL 16 (L)   IGM      35 - 242 mg/dL 19 (L)   [place] Free Lt Chain      0.33 - 1.94 mg/dL 0.69   Lambda Free Lt Chain      0.57 - 2.63 mg/dL 0.29 (L)   Kappa Lambda Ratio      0.26 - 1.65 2.38 (H)   Viscosity Index      1.4  - 1.8 1.5   Immunofixation ELP       Large monoclonal IgG immunoglobulin of kappa light chain type. Monoclonal antibody therapeutics (e.g. Daratumumab) may appear as monoclonal proteins on serum electrophoresis and immunofixation, but are usually only very small IgG kappa monoclonals. Nevertheless, results should be interpreted with caution if the patient is receiving monoclonal antibody therapy. Pathologic significance requires clinical correlation.  BIA Izaguirre M.D., Ph.D., Pathologist ()   Total Protein Serum for ELP      6.4 - 8.3 g/dL 6.6       Imaging Studies:  None this visit.     ASSESSMENT/PLAN:  #1 Multiple myeloma:   IgG kappa multiple myeloma with 80% involvement of the bone marrow diagnosed June 2019 initially treated with Velcade and Decadron and received 4 cycles with increasing M-spike and kappa/lambda ratio.  Treatment changed to CyBorD on 10/1/2019.  M-spike plateau at 1.2 and treatment changed to monotherapy with Darzalex Faspro injection. M spike declined to 1.0 then increased again to 1.2 so Velcade and Dex added to her treatment plan with cycle 5 therapy. Following C22 (April 2022), she did initiate a treatment holiday when her Mspike was 0.3. Following a 6 week holiday, her mspike dropped from 0.3 to 0.2. Other labs stable. We continued treatment holiday. Today, Mspike stable at 0.3. Kappa light chains stable. Very slight rise in IgG. Patient continues to feel well having been on treatment holiday. Given stable labs and her feeling well, we will continue treatment holiday seeing her back in 6 weeks with labs prior.     #2 Lytic lesions: She remains on calcium with vitamin D twice a day. Weight bearing activity. Will resume zometa with treatment.     #3 Skin rash Unsure of etiology of these bumps. Appear like bug bites but patient doesn't believe that's what these are. They resolve over time. I don't believe it is related to her myeloma or treatment. Follow-up with PCP if it  worsens.     Patient in agreement with plan and verbalizes understanding. Agrees to call with any questions or concerns.    47 minutes spent in the patient's encounter today with time spent in review of patient's chart along with chart preparation and review of the treatment plan and signing of treatment plan.  Time was also spent with the patient in obtaining a review of systems and performing a physical exam along with detailed review of all test results. Time was also spent in discussing plan for future follow-up and relating instructions for follow-up and in placing future orders.    FLO Duque, FNP-C

## 2022-07-07 LAB — B2 MICROGLOB TUMOR MARKER SER-MCNC: 3.3 MG/L

## 2022-07-12 ENCOUNTER — APPOINTMENT (OUTPATIENT)
Dept: LAB | Facility: OTHER | Age: 72
End: 2022-07-12
Attending: NURSE PRACTITIONER
Payer: MEDICARE

## 2022-07-12 ENCOUNTER — APPOINTMENT (OUTPATIENT)
Dept: INFUSION THERAPY | Facility: OTHER | Age: 72
End: 2022-07-12
Attending: INTERNAL MEDICINE
Payer: MEDICARE

## 2022-07-12 ENCOUNTER — ONCOLOGY VISIT (OUTPATIENT)
Dept: ONCOLOGY | Facility: OTHER | Age: 72
End: 2022-07-12
Attending: NURSE PRACTITIONER
Payer: COMMERCIAL

## 2022-07-12 VITALS
HEIGHT: 63 IN | HEART RATE: 77 BPM | RESPIRATION RATE: 19 BRPM | OXYGEN SATURATION: 96 % | SYSTOLIC BLOOD PRESSURE: 124 MMHG | WEIGHT: 160.5 LBS | TEMPERATURE: 98 F | DIASTOLIC BLOOD PRESSURE: 72 MMHG | BODY MASS INDEX: 28.44 KG/M2

## 2022-07-12 DIAGNOSIS — M89.9 BONE LESION: ICD-10-CM

## 2022-07-12 DIAGNOSIS — R21 RASH AND NONSPECIFIC SKIN ERUPTION: ICD-10-CM

## 2022-07-12 DIAGNOSIS — C90.00 MULTIPLE MYELOMA NOT HAVING ACHIEVED REMISSION (H): Primary | ICD-10-CM

## 2022-07-12 LAB
ALBUMIN SERPL-MCNC: 3.7 G/DL (ref 3.4–5)
ALP SERPL-CCNC: 138 U/L (ref 40–150)
ALT SERPL W P-5'-P-CCNC: 23 U/L (ref 0–50)
ANION GAP SERPL CALCULATED.3IONS-SCNC: 5 MMOL/L (ref 3–14)
AST SERPL W P-5'-P-CCNC: 18 U/L (ref 0–45)
BASOPHILS # BLD AUTO: 0 10E3/UL (ref 0–0.2)
BASOPHILS NFR BLD AUTO: 1 %
BILIRUB SERPL-MCNC: 0.4 MG/DL (ref 0.2–1.3)
BUN SERPL-MCNC: 15 MG/DL (ref 7–30)
CALCIUM SERPL-MCNC: 9.4 MG/DL (ref 8.5–10.1)
CHLORIDE BLD-SCNC: 106 MMOL/L (ref 94–109)
CO2 SERPL-SCNC: 26 MMOL/L (ref 20–32)
CREAT SERPL-MCNC: 0.68 MG/DL (ref 0.52–1.04)
EOSINOPHIL # BLD AUTO: 0.2 10E3/UL (ref 0–0.7)
EOSINOPHIL NFR BLD AUTO: 4 %
ERYTHROCYTE [DISTWIDTH] IN BLOOD BY AUTOMATED COUNT: 13.3 % (ref 10–15)
GFR SERPL CREATININE-BSD FRML MDRD: >90 ML/MIN/1.73M2
GLUCOSE BLD-MCNC: 120 MG/DL (ref 70–99)
HCT VFR BLD AUTO: 41.2 % (ref 35–47)
HGB BLD-MCNC: 14.3 G/DL (ref 11.7–15.7)
IMM GRANULOCYTES # BLD: 0 10E3/UL
IMM GRANULOCYTES NFR BLD: 0 %
LYMPHOCYTES # BLD AUTO: 1.6 10E3/UL (ref 0.8–5.3)
LYMPHOCYTES NFR BLD AUTO: 34 %
MCH RBC QN AUTO: 31.7 PG (ref 26.5–33)
MCHC RBC AUTO-ENTMCNC: 34.7 G/DL (ref 31.5–36.5)
MCV RBC AUTO: 91 FL (ref 78–100)
MONOCYTES # BLD AUTO: 0.5 10E3/UL (ref 0–1.3)
MONOCYTES NFR BLD AUTO: 10 %
NEUTROPHILS # BLD AUTO: 2.4 10E3/UL (ref 1.6–8.3)
NEUTROPHILS NFR BLD AUTO: 51 %
NRBC # BLD AUTO: 0 10E3/UL
NRBC BLD AUTO-RTO: 0 /100
PLATELET # BLD AUTO: 211 10E3/UL (ref 150–450)
POTASSIUM BLD-SCNC: 3.9 MMOL/L (ref 3.4–5.3)
PROT SERPL-MCNC: 6.9 G/DL (ref 6.8–8.8)
RBC # BLD AUTO: 4.51 10E6/UL (ref 3.8–5.2)
SODIUM SERPL-SCNC: 137 MMOL/L (ref 133–144)
WBC # BLD AUTO: 4.8 10E3/UL (ref 4–11)

## 2022-07-12 PROCEDURE — 85025 COMPLETE CBC W/AUTO DIFF WBC: CPT | Mod: ZL | Performed by: NURSE PRACTITIONER

## 2022-07-12 PROCEDURE — 80053 COMPREHEN METABOLIC PANEL: CPT | Mod: ZL | Performed by: NURSE PRACTITIONER

## 2022-07-12 PROCEDURE — 99215 OFFICE O/P EST HI 40 MIN: CPT | Performed by: NURSE PRACTITIONER

## 2022-07-12 PROCEDURE — G0463 HOSPITAL OUTPT CLINIC VISIT: HCPCS

## 2022-07-12 PROCEDURE — 36415 COLL VENOUS BLD VENIPUNCTURE: CPT | Mod: ZL | Performed by: NURSE PRACTITIONER

## 2022-07-12 ASSESSMENT — PAIN SCALES - GENERAL: PAINLEVEL: NO PAIN (0)

## 2022-07-12 NOTE — NURSING NOTE
"Oncology Rooming Note    July 12, 2022 10:39 AM   Carmelita Watts is a 71 year old female who presents for:    Chief Complaint   Patient presents with     Oncology Clinic Visit     Follow up. Multiple myeloma not having achieved remission      Initial Vitals: /72   Pulse 77   Temp 98  F (36.7  C) (Tympanic)   Resp 19   Ht 1.6 m (5' 3\")   Wt 72.8 kg (160 lb 7.9 oz)   SpO2 96%   BMI 28.43 kg/m   Estimated body mass index is 28.43 kg/m  as calculated from the following:    Height as of this encounter: 1.6 m (5' 3\").    Weight as of this encounter: 72.8 kg (160 lb 7.9 oz). Body surface area is 1.8 meters squared.  No Pain (0) Comment: Data Unavailable   No LMP recorded. Patient is postmenopausal.  Allergies reviewed: Yes  Medications reviewed: Yes    Medications: Medication refills not needed today.  Pharmacy name entered into [facility]:    [id number] - [place], [place] - [address]  AT [facility] [address]  [facility] - [place], [place] - [address]  [facility] - [place] - [address]              "

## 2022-07-12 NOTE — PATIENT INSTRUCTIONS
Thank you for allowing me to be a part of your care today.    We will cancel today's infusion, and plan on seeing you back in about 6 weeks with labs completed the week prior. We can cancel any infusion related appointments that have been made as we will be continuing our treatment holiday.     If you have any questions please call [phone]    Other instructions:      None

## 2022-07-27 NOTE — PROGRESS NOTES
Chief Complaint   Patient presents with     Ear Problem     Ear drainage       Patient presents to ENT for ear drainage. She was last seen in ENT on 3/21/22 for otalgia, coughing, URI. She was started on po Azithromycin. Her labs including Influenza, RSV and COVID were negative.       Today. She presents for concerns of ear drainage.  Patient has felt for approximately 1 month that she has had some degree of moisture in her left ear.  She states that when she has her hearing aid as she will feel some whooshing sensation and removes hearing aid in her symptoms clear.  She denies any foul-smelling odor or drainage on her outer ear.  Hearing has remained stable.    Denies otalgia, otorrhea  Denies worrisome tinnitus  Denies fluctuating hearing loss or tinnitus.   Denies vertigo or facial paraesthesia.    Right tube has been absent and right TM intact without effusion.  Left TM does have small perforation noted.   BTTI placed on 9/20/19 by Dr. Ley in office        Audiogram- 2/11/21  Tympanograms  Type A Right   Type B, large volume left.    Thresholds are improved right 250 Hz sloping to stable mild to severe SNHL and  Left ear decreased moderate to severe mixed loss.   SRT=PTA  WRS-  Right- 100%@70dB  Left- 88% @70 dB         Past Medical History:   Diagnosis Date     Arthritis      Cyst of breast      Depressive disorder      Diabetes mellitus, type 2 (H) 1/18/2021     Essential hypertension 10/1/2015     Major depressive disorder, recurrent episode, mild (H) 10/1/2015     Mixed hyperlipidemia 10/1/2015     Multiple myeloma not having achieved remission (H) 6/24/2019     Other specified disorders of bladder 07/09/2012    Irritable Bladder     Seasonal allergies 10/1/2015     Unspecified essential hypertension 03/19/2007     Unspecified sinusitis (chronic) 09/05/2007        Allergies   Allergen Reactions     Lisinopril Cough     Phenylephrine Hcl Other (See Comments)     **Entex  HEADACHE (SEVERE)      "Phenylpropanolamine Other (See Comments)     **Entex  HEADACHE (SEVERE)     Pseudoephedrine Tannate Other (See Comments)     **Entex  HEADACHE (SEVERE)     Levofloxacin Rash     **Levaquin     Moxifloxacin Hcl [Avelox] Rash     Current Outpatient Medications   Medication     acyclovir (ZOVIRAX) 400 MG tablet     aspirin (ASA) 81 MG chewable tablet     atorvastatin (LIPITOR) 10 MG tablet     cetirizine (ZYRTEC) 10 MG tablet     dexamethasone (DECADRON) 4 MG tablet     EPINEPHrine (EPIPEN) 0.3 MG/0.3ML injection     fluticasone (FLONASE) 50 MCG/ACT nasal spray     L-Lysine HCl 500 MG CAPS     losartan (COZAAR) 50 MG tablet     prochlorperazine (COMPAZINE) 10 MG tablet     sertraline (ZOLOFT) 50 MG tablet     No current facility-administered medications for this visit.     ROS- SEE HPI  /74 (Cuff Size: Adult Regular)   Pulse 78   Temp 98.6  F (37  C) (Tympanic)   Ht 1.6 m (5' 3\")   Wt 70.3 kg (155 lb)   SpO2 98%   BMI 27.46 kg/m      General - The patient is well nourished and well developed, and appears to have good nutritional status.    Head and Face - Normocephalic and atraumatic, with no gross asymmetry noted of the contour of the facial features.  The facial nerve is intact, with strong symmetric movements.  Eyes - Extraocular movements intact, and the pupils were reactive to light.  Sclera were not icteric or injected, conjunctiva were pink and moist.  Mouth - Examination of the oral cavity shows pink, healthy, moist mucosa.  No lesions or ulceration noted.  The dentition are in good repair.  The tongue is mobile and midline.  Ears - Examination of the ears showed- Right canal clear. Right TM is intact without effusion or perforation. Ears examined under otologic microscopy. Canal cleaned with cupped forceps and #5 Nguyen, there was otorrhea throughout on left EAC.  Perforation appears remaining there is granulation tissue around inferior margin.  There is a small aural polyp along the inferior " posterior of the perforation.  Left TM is with perforation, central anterior about 15%. ME appears healthy   Ciprodex applied  Eyes - Extraocular movements intact, and the pupils were reactive to light.  Sclera were not icteric or injected, conjunctiva were pink and moist.  Mouth - Examination of the oral cavity showed pink, healthy oral mucosa. No lesions or ulcerations noted.  The tongue was mobile and midline, and the dentition were in good condition.    Throat - The walls of the oropharynx were smooth, pink, moist, symmetric, and had no lesions or ulcerations.  The tonsillar pillars and soft palate were symmetric.  The uvula was midline on elevation.    Neck - Normal midline excursion of the laryngotracheal complex during swallowing.  Full range of motion on passive movement.  Palpation of the occipital, submental, submandibular, internal jugular chain, and supraclavicular nodes did not demonstrate any abnormal lymph nodes or masses.  Palpation of the thyroid was soft and smooth, with no nodules or goiter appreciated.  The trachea was mobile and midline.            ASSESSMENT/ PLAN:    ICD-10-CM    1. Otorrhea, left  H92.12 ciprofloxacin-dexamethasone (CIPRODEX) 0.3-0.1 % otic suspension   2. Aural polyp of middle ear, left  H74.42 ciprofloxacin-dexamethasone (CIPRODEX) 0.3-0.1 % otic suspension   3. Perforation of tympanic membrane, left  H72.92    4. H/O myringotomy w/ tube placement  Z98.890    5. Multiple myeloma not having achieved remission (H)  C90.00          Discussed with Jo today that she does have active otorrhea from her left ear.  She was recommended to start Ciprodex 4 drops twice daily for 7 days.  Water precautions.  Keep hearing aid out while she is on eardrops.  Return to ENT in 1 to 2 weeks for repeat exam to ensure resolution of polyp.  Discussed use of hearing aids and removing hearing aid at times to allow venting in her left ear.    Continue with NeilMed rinses and Flonase.  She  does currently have daily antihistamine as well.      Bridgette Ly PA-C  ENT  [facility], [place]

## 2022-07-28 ENCOUNTER — OFFICE VISIT (OUTPATIENT)
Dept: OTOLARYNGOLOGY | Facility: OTHER | Age: 72
End: 2022-07-28
Attending: PHYSICIAN ASSISTANT
Payer: COMMERCIAL

## 2022-07-28 VITALS
DIASTOLIC BLOOD PRESSURE: 74 MMHG | WEIGHT: 155 LBS | SYSTOLIC BLOOD PRESSURE: 128 MMHG | HEIGHT: 63 IN | TEMPERATURE: 98.6 F | BODY MASS INDEX: 27.46 KG/M2 | OXYGEN SATURATION: 98 % | HEART RATE: 78 BPM

## 2022-07-28 DIAGNOSIS — H72.92 PERFORATION OF TYMPANIC MEMBRANE, LEFT: ICD-10-CM

## 2022-07-28 DIAGNOSIS — C90.00 MULTIPLE MYELOMA NOT HAVING ACHIEVED REMISSION (H): ICD-10-CM

## 2022-07-28 DIAGNOSIS — H74.42 AURAL POLYP OF MIDDLE EAR, LEFT: ICD-10-CM

## 2022-07-28 DIAGNOSIS — Z98.890 H/O MYRINGOTOMY: ICD-10-CM

## 2022-07-28 DIAGNOSIS — H92.12 OTORRHEA, LEFT: Primary | ICD-10-CM

## 2022-07-28 PROCEDURE — 92504 EAR MICROSCOPY EXAMINATION: CPT | Performed by: PHYSICIAN ASSISTANT

## 2022-07-28 PROCEDURE — G0463 HOSPITAL OUTPT CLINIC VISIT: HCPCS

## 2022-07-28 PROCEDURE — 99213 OFFICE O/P EST LOW 20 MIN: CPT | Mod: 25 | Performed by: PHYSICIAN ASSISTANT

## 2022-07-28 RX ORDER — SULFACETAMIDE SODIUM AND PREDNISOLONE SODIUM PHOSPHATE 100; 2.3 MG/ML; MG/ML
SOLUTION/ DROPS OPHTHALMIC
Qty: 10 ML | Refills: 0 | Status: SHIPPED | OUTPATIENT
Start: 2022-07-28 | End: 2022-11-16

## 2022-07-28 RX ORDER — CIPROFLOXACIN AND DEXAMETHASONE 3; 1 MG/ML; MG/ML
4 SUSPENSION/ DROPS AURICULAR (OTIC) 2 TIMES DAILY
Qty: 2.8 ML | Refills: 1 | Status: SHIPPED | OUTPATIENT
Start: 2022-07-28 | End: 2022-08-04

## 2022-07-28 ASSESSMENT — PAIN SCALES - GENERAL: PAINLEVEL: NO PAIN (0)

## 2022-07-28 NOTE — NURSING NOTE
"Chief Complaint   Patient presents with     Ear Problem     Ear drainage       Initial /74 (Cuff Size: Adult Regular)   Pulse 78   Temp 98.6  F (37  C) (Tympanic)   Ht 1.6 m (5' 3\")   Wt 70.3 kg (155 lb)   SpO2 98%   BMI 27.46 kg/m   Estimated body mass index is 27.46 kg/m  as calculated from the following:    Height as of this encounter: 1.6 m (5' 3\").    Weight as of this encounter: 70.3 kg (155 lb).  Medication Reconciliation: complete  Elizabeth Frances LPN    "

## 2022-07-28 NOTE — PATIENT INSTRUCTIONS
Start Ciprodex 4 drops twice a day for 7 days to left ear  Keep ear dry  No hearing aid on left for 1 week    Return in 1 week for recheck. August 9     Thank you for allowing Bridgette Ly PA-C and our ENT team to participate in your care.  If your medications are too expensive, please give the nurse a call.  We can possibly change this medication.  If you have a scheduling or an appointment question please contact our Health Unit Coordinator at [phone], Ext. [phone].    ALL nursing questions or concerns can be directed to your ENT nurse at: [phone] Ruby

## 2022-07-28 NOTE — LETTER
7/28/2022         RE: Carmelita Watts  [address] [facility] [address]        Dear Colleague,    Thank you for referring your patient, Carmelita Watts, to the [facility]. Please see a copy of my visit note below.    Chief Complaint   Patient presents with     Ear Problem     Ear drainage       Patient presents to ENT for ear drainage. She was last seen in ENT on 3/21/22 for otalgia, coughing, URI. She was started on po Azithromycin. Her labs including Influenza, RSV and COVID were negative.       Today. She presents for concerns of ear drainage.  Patient has felt for approximately 1 month that she has had some degree of moisture in her left ear.  She states that when she has her hearing aid as she will feel some whooshing sensation and removes hearing aid in her symptoms clear.  She denies any foul-smelling odor or drainage on her outer ear.  Hearing has remained stable.    Denies otalgia, otorrhea  Denies worrisome tinnitus  Denies fluctuating hearing loss or tinnitus.   Denies vertigo or facial paraesthesia.    Right tube has been absent and right TM intact without effusion.  Left TM does have small perforation noted.   BTTI placed on 9/20/19 by Dr. Ley in office        Audiogram- 2/11/21  Tympanograms  Type A Right   Type B, large volume left.    Thresholds are improved right 250 Hz sloping to stable mild to severe SNHL and  Left ear decreased moderate to severe mixed loss.   SRT=PTA  WRS-  Right- 100%@70dB  Left- 88% @70 dB         Past Medical History:   Diagnosis Date     Arthritis      Cyst of breast      Depressive disorder      Diabetes mellitus, type 2 (H) 1/18/2021     Essential hypertension 10/1/2015     Major depressive disorder, recurrent episode, mild (H) 10/1/2015     Mixed hyperlipidemia 10/1/2015     Multiple myeloma not having achieved remission (H) 6/24/2019     Other specified disorders of bladder 07/09/2012    Irritable Bladder     Seasonal allergies  "10/1/2015     Unspecified essential hypertension 03/19/2007     Unspecified sinusitis (chronic) 09/05/2007        Allergies   Allergen Reactions     Lisinopril Cough     Phenylephrine Hcl Other (See Comments)     **Entex  HEADACHE (SEVERE)     Phenylpropanolamine Other (See Comments)     **Entex  HEADACHE (SEVERE)     Pseudoephedrine Tannate Other (See Comments)     **Entex  HEADACHE (SEVERE)     Levofloxacin Rash     **Levaquin     Moxifloxacin Hcl [Avelox] Rash     Current Outpatient Medications   Medication     acyclovir (ZOVIRAX) 400 MG tablet     aspirin (ASA) 81 MG chewable tablet     atorvastatin (LIPITOR) 10 MG tablet     cetirizine (ZYRTEC) 10 MG tablet     dexamethasone (DECADRON) 4 MG tablet     EPINEPHrine (EPIPEN) 0.3 MG/0.3ML injection     fluticasone (FLONASE) 50 MCG/ACT nasal spray     L-Lysine HCl 500 MG CAPS     losartan (COZAAR) 50 MG tablet     prochlorperazine (COMPAZINE) 10 MG tablet     sertraline (ZOLOFT) 50 MG tablet     No current facility-administered medications for this visit.     ROS- SEE HPI  /74 (Cuff Size: Adult Regular)   Pulse 78   Temp 98.6  F (37  C) (Tympanic)   Ht 1.6 m (5' 3\")   Wt 70.3 kg (155 lb)   SpO2 98%   BMI 27.46 kg/m      General - The patient is well nourished and well developed, and appears to have good nutritional status.    Head and Face - Normocephalic and atraumatic, with no gross asymmetry noted of the contour of the facial features.  The facial nerve is intact, with strong symmetric movements.  Eyes - Extraocular movements intact, and the pupils were reactive to light.  Sclera were not icteric or injected, conjunctiva were pink and moist.  Mouth - Examination of the oral cavity shows pink, healthy, moist mucosa.  No lesions or ulceration noted.  The dentition are in good repair.  The tongue is mobile and midline.  Ears - Examination of the ears showed- Right canal clear. Right TM is intact without effusion or perforation. Ears examined under " otologic microscopy. Canal cleaned with cupped forceps and #5 Nguyen, there was otorrhea throughout on left EAC.  Perforation appears remaining there is granulation tissue around inferior margin.  There is a small aural polyp along the inferior posterior of the perforation.  Left TM is with perforation, central anterior about 15%. ME appears healthy   Ciprodex applied  Eyes - Extraocular movements intact, and the pupils were reactive to light.  Sclera were not icteric or injected, conjunctiva were pink and moist.  Mouth - Examination of the oral cavity showed pink, healthy oral mucosa. No lesions or ulcerations noted.  The tongue was mobile and midline, and the dentition were in good condition.    Throat - The walls of the oropharynx were smooth, pink, moist, symmetric, and had no lesions or ulcerations.  The tonsillar pillars and soft palate were symmetric.  The uvula was midline on elevation.    Neck - Normal midline excursion of the laryngotracheal complex during swallowing.  Full range of motion on passive movement.  Palpation of the occipital, submental, submandibular, internal jugular chain, and supraclavicular nodes did not demonstrate any abnormal lymph nodes or masses.  Palpation of the thyroid was soft and smooth, with no nodules or goiter appreciated.  The trachea was mobile and midline.            ASSESSMENT/ PLAN:    ICD-10-CM    1. Otorrhea, left  H92.12 ciprofloxacin-dexamethasone (CIPRODEX) 0.3-0.1 % otic suspension   2. Aural polyp of middle ear, left  H74.42 ciprofloxacin-dexamethasone (CIPRODEX) 0.3-0.1 % otic suspension   3. Perforation of tympanic membrane, left  H72.92    4. H/O myringotomy w/ tube placement  Z98.890    5. Multiple myeloma not having achieved remission (H)  C90.00          Discussed with Jo today that she does have active otorrhea from her left ear.  She was recommended to start Ciprodex 4 drops twice daily for 7 days.  Water precautions.  Keep hearing aid out while she  is on eardrops.  Return to ENT in 1 to 2 weeks for repeat exam to ensure resolution of polyp.  Discussed use of hearing aids and removing hearing aid at times to allow venting in her left ear.    Continue with NeilMed rinses and Flonase.  She does currently have daily antihistamine as well.      Bridgette Ly PA-C  ENT  [facility], [place]          Again, thank you for allowing me to participate in the care of your patient.        Sincerely,        Bridgette Ly PA-C

## 2022-08-03 NOTE — PROGRESS NOTES
Chief Complaint   Patient presents with     Ear Problem     Follow up left otorrhea, aural polyp of middle ear left, and left TM perforation.       Patient returns to ENT for follow-up exam.  Patient was last seen on 7/28/2022 for concerns of ear drainage.  At that visit she had her left ear clearing.  There is dried debris as well as active drainage from left tympanic membrane perforation and middle ear.  There was polypoid in aural polyp noted on left inferior to perforation.  Patient was started on Ciprodex 4 drops twice daily for at least 7 days she was recommended to return to ENT for follow-up exam.  She has been doing well.   Reports less thumping, fullness.     Denies otalgia, otorrhea  Denies worrisome tinnitus  Denies fluctuating hearing loss or tinnitus.   Denies vertigo or facial paraesthesia.    Right tube has been absent and right TM intact without effusion.  Left TM does have small perforation noted.   BTTI placed on 9/20/19 by Dr. Lye in office       Past Medical History:   Diagnosis Date     Arthritis      Cyst of breast      Depressive disorder      Diabetes mellitus, type 2 (H) 1/18/2021     Essential hypertension 10/1/2015     Major depressive disorder, recurrent episode, mild (H) 10/1/2015     Mixed hyperlipidemia 10/1/2015     Multiple myeloma not having achieved remission (H) 6/24/2019     Other specified disorders of bladder 07/09/2012    Irritable Bladder     Seasonal allergies 10/1/2015     Unspecified essential hypertension 03/19/2007     Unspecified sinusitis (chronic) 09/05/2007        Allergies   Allergen Reactions     Lisinopril Cough     Phenylephrine Hcl Other (See Comments)     **Entex  HEADACHE (SEVERE)     Phenylpropanolamine Other (See Comments)     **Entex  HEADACHE (SEVERE)     Pseudoephedrine Tannate Other (See Comments)     **Entex  HEADACHE (SEVERE)     Levofloxacin Rash     **Levaquin     Moxifloxacin Hcl [Avelox] Rash     Current Outpatient Medications   Medication  "    acyclovir (ZOVIRAX) 400 MG tablet     aspirin (ASA) 81 MG chewable tablet     atorvastatin (LIPITOR) 10 MG tablet     cetirizine (ZYRTEC) 10 MG tablet     ciprofloxacin-dexamethasone (CIPRODEX) 0.3-0.1 % otic suspension     dexamethasone (DECADRON) 4 MG tablet     EPINEPHrine (EPIPEN) 0.3 MG/0.3ML injection     fluticasone (FLONASE) 50 MCG/ACT nasal spray     L-Lysine HCl 500 MG CAPS     losartan (COZAAR) 50 MG tablet     prochlorperazine (COMPAZINE) 10 MG tablet     sertraline (ZOLOFT) 50 MG tablet     sulfacetamide-prednisoLONE (VASOCIDIN) 10-0.23 % ophthalmic solution     No current facility-administered medications for this visit.     ROS- SEE HPI  /70 (BP Location: Left arm, Cuff Size: Adult Regular)   Pulse 85   Temp 97.4  F (36.3  C) (Tympanic)   Resp 15   Ht 1.6 m (5' 3\")   Wt 70.3 kg (155 lb)   SpO2 95%   BMI 27.46 kg/m      General - The patient is well nourished and well developed, and appears to have good nutritional status.    Head and Face - Normocephalic and atraumatic, with no gross asymmetry noted of the contour of the facial features.  The facial nerve is intact, with strong symmetric movements.  Eyes - Extraocular movements intact, and the pupils were reactive to light.  Sclera were not icteric or injected, conjunctiva were pink and moist.  Mouth - Examination of the oral cavity shows pink, healthy, moist mucosa.  No lesions or ulceration noted.  The dentition are in good repair.  The tongue is mobile and midline.  Ears - Examination of the ears showed- Right canal clear. Right TM is intact without effusion or perforation. LEFT Ear examined under otologic microscopy.  Resolved otorrhea.  There is no active drainage.  Middle ear space appears dry.  Polyp resolved  Left TM is with perforation, central anterior about 15%. ME appears healthy   Eyes - Extraocular movements intact, and the pupils were reactive to light.  Sclera were not icteric or injected, conjunctiva were pink and " moist.  Mouth - Examination of the oral cavity showed pink, healthy oral mucosa. No lesions or ulcerations noted.  The tongue was mobile and midline, and the dentition were in good condition.    Throat - The walls of the oropharynx were smooth, pink, moist, symmetric, and had no lesions or ulcerations.  The tonsillar pillars and soft palate were symmetric.  The uvula was midline on elevation.    Neck - Normal midline excursion of the laryngotracheal complex during swallowing.  Full range of motion on passive movement.  Palpation of the occipital, submental, submandibular, internal jugular chain, and supraclavicular nodes did not demonstrate any abnormal lymph nodes or masses.  Palpation of the thyroid was soft and smooth, with no nodules or goiter appreciated.  The trachea was mobile and midline.    ASSESSMENT/ PLAN:    ICD-10-CM    1. Perforation of tympanic membrane, left  H72.92    2. H/O myringotomy w/ tube placement  Z98.890    3. Multiple myeloma not having achieved remission (H)  C90.00            Ear appears improved at this time  Water precautions.   Follow up in 6 months for repeat ear exam    Will continue with observation of her Left TM perforation.     Return to ENT sooner with any drainage or concerns.     Bridgette Ly PA-C  ENT  [facility]

## 2022-08-09 ENCOUNTER — OFFICE VISIT (OUTPATIENT)
Dept: OTOLARYNGOLOGY | Facility: OTHER | Age: 72
End: 2022-08-09
Attending: PHYSICIAN ASSISTANT
Payer: COMMERCIAL

## 2022-08-09 VITALS
TEMPERATURE: 97.4 F | HEART RATE: 85 BPM | RESPIRATION RATE: 15 BRPM | OXYGEN SATURATION: 95 % | HEIGHT: 63 IN | SYSTOLIC BLOOD PRESSURE: 108 MMHG | WEIGHT: 155 LBS | BODY MASS INDEX: 27.46 KG/M2 | DIASTOLIC BLOOD PRESSURE: 70 MMHG

## 2022-08-09 DIAGNOSIS — C90.00 MULTIPLE MYELOMA NOT HAVING ACHIEVED REMISSION (H): ICD-10-CM

## 2022-08-09 DIAGNOSIS — H72.92 PERFORATION OF TYMPANIC MEMBRANE, LEFT: Primary | ICD-10-CM

## 2022-08-09 DIAGNOSIS — Z98.890 H/O MYRINGOTOMY: ICD-10-CM

## 2022-08-09 PROCEDURE — G0463 HOSPITAL OUTPT CLINIC VISIT: HCPCS

## 2022-08-09 PROCEDURE — 99213 OFFICE O/P EST LOW 20 MIN: CPT | Performed by: PHYSICIAN ASSISTANT

## 2022-08-09 ASSESSMENT — PAIN SCALES - GENERAL: PAINLEVEL: NO PAIN (1)

## 2022-08-09 NOTE — LETTER
8/9/2022         RE: Carmelita Watts  [address] [facility] [address]        Dear Colleague,    Thank you for referring your patient, Carmelita Watts, to the [facility]. Please see a copy of my visit note below.    Chief Complaint   Patient presents with     Ear Problem     Follow up left otorrhea, aural polyp of middle ear left, and left TM perforation.       Patient returns to ENT for follow-up exam.  Patient was last seen on 7/28/2022 for concerns of ear drainage.  At that visit she had her left ear clearing.  There is dried debris as well as active drainage from left tympanic membrane perforation and middle ear.  There was polypoid in aural polyp noted on left inferior to perforation.  Patient was started on Ciprodex 4 drops twice daily for at least 7 days she was recommended to return to ENT for follow-up exam.  She has been doing well.   Reports less thumping, fullness.     Denies otalgia, otorrhea  Denies worrisome tinnitus  Denies fluctuating hearing loss or tinnitus.   Denies vertigo or facial paraesthesia.    Right tube has been absent and right TM intact without effusion.  Left TM does have small perforation noted.   BTTI placed on 9/20/19 by Dr. Ley in office       Past Medical History:   Diagnosis Date     Arthritis      Cyst of breast      Depressive disorder      Diabetes mellitus, type 2 (H) 1/18/2021     Essential hypertension 10/1/2015     Major depressive disorder, recurrent episode, mild (H) 10/1/2015     Mixed hyperlipidemia 10/1/2015     Multiple myeloma not having achieved remission (H) 6/24/2019     Other specified disorders of bladder 07/09/2012    Irritable Bladder     Seasonal allergies 10/1/2015     Unspecified essential hypertension 03/19/2007     Unspecified sinusitis (chronic) 09/05/2007        Allergies   Allergen Reactions     Lisinopril Cough     Phenylephrine Hcl Other (See Comments)     **Entex  HEADACHE (SEVERE)     Phenylpropanolamine Other  "(See Comments)     **Entex  HEADACHE (SEVERE)     Pseudoephedrine Tannate Other (See Comments)     **Entex  HEADACHE (SEVERE)     Levofloxacin Rash     **Levaquin     Moxifloxacin Hcl [Avelox] Rash     Current Outpatient Medications   Medication     acyclovir (ZOVIRAX) 400 MG tablet     aspirin (ASA) 81 MG chewable tablet     atorvastatin (LIPITOR) 10 MG tablet     cetirizine (ZYRTEC) 10 MG tablet     ciprofloxacin-dexamethasone (CIPRODEX) 0.3-0.1 % otic suspension     dexamethasone (DECADRON) 4 MG tablet     EPINEPHrine (EPIPEN) 0.3 MG/0.3ML injection     fluticasone (FLONASE) 50 MCG/ACT nasal spray     L-Lysine HCl 500 MG CAPS     losartan (COZAAR) 50 MG tablet     prochlorperazine (COMPAZINE) 10 MG tablet     sertraline (ZOLOFT) 50 MG tablet     sulfacetamide-prednisoLONE (VASOCIDIN) 10-0.23 % ophthalmic solution     No current facility-administered medications for this visit.     ROS- SEE HPI  /70 (BP Location: Left arm, Cuff Size: Adult Regular)   Pulse 85   Temp 97.4  F (36.3  C) (Tympanic)   Resp 15   Ht 1.6 m (5' 3\")   Wt 70.3 kg (155 lb)   SpO2 95%   BMI 27.46 kg/m      General - The patient is well nourished and well developed, and appears to have good nutritional status.    Head and Face - Normocephalic and atraumatic, with no gross asymmetry noted of the contour of the facial features.  The facial nerve is intact, with strong symmetric movements.  Eyes - Extraocular movements intact, and the pupils were reactive to light.  Sclera were not icteric or injected, conjunctiva were pink and moist.  Mouth - Examination of the oral cavity shows pink, healthy, moist mucosa.  No lesions or ulceration noted.  The dentition are in good repair.  The tongue is mobile and midline.  Ears - Examination of the ears showed- Right canal clear. Right TM is intact without effusion or perforation. LEFT Ear examined under otologic microscopy.  Resolved otorrhea.  There is no active drainage.  Middle ear space " appears dry.  Polyp resolved  Left TM is with perforation, central anterior about 15%. ME appears healthy   Eyes - Extraocular movements intact, and the pupils were reactive to light.  Sclera were not icteric or injected, conjunctiva were pink and moist.  Mouth - Examination of the oral cavity showed pink, healthy oral mucosa. No lesions or ulcerations noted.  The tongue was mobile and midline, and the dentition were in good condition.    Throat - The walls of the oropharynx were smooth, pink, moist, symmetric, and had no lesions or ulcerations.  The tonsillar pillars and soft palate were symmetric.  The uvula was midline on elevation.    Neck - Normal midline excursion of the laryngotracheal complex during swallowing.  Full range of motion on passive movement.  Palpation of the occipital, submental, submandibular, internal jugular chain, and supraclavicular nodes did not demonstrate any abnormal lymph nodes or masses.  Palpation of the thyroid was soft and smooth, with no nodules or goiter appreciated.  The trachea was mobile and midline.    ASSESSMENT/ PLAN:    ICD-10-CM    1. Perforation of tympanic membrane, left  H72.92    2. H/O myringotomy w/ tube placement  Z98.890    3. Multiple myeloma not having achieved remission (H)  C90.00            Ear appears improved at this time  Water precautions.   Follow up in 6 months for repeat ear exam    Will continue with observation of her Left TM perforation.     Return to ENT sooner with any drainage or concerns.     Bridgette Ly PA-C  ENT  [facility], [place]          Again, thank you for allowing me to participate in the care of your patient.        Sincerely,        Bridgette Ly PA-C

## 2022-08-09 NOTE — PATIENT INSTRUCTIONS
Ears look well.   Left ear- ear drainage/ polyp resolved   Allow ear to vent at times.   Continue with water precautions.     Follow up in 6 months      Thank you for allowing Bridgette Ly PA-C and our ENT team to participate in your care.  If your medications are too expensive, please give the nurse a call.  We can possibly change this medication.  If you have a scheduling or an appointment question please contact our Health Unit Coordinator at [phone], Ext. [phone].    ALL nursing questions or concerns can be directed to your ENT nurse at: [phone] [place]

## 2022-08-09 NOTE — NURSING NOTE
"Chief Complaint   Patient presents with     Ear Problem     Follow up left otorrhea, aural polyp of middle ear left, and left TM perforation.       Initial /70 (BP Location: Left arm, Cuff Size: Adult Regular)   Pulse 85   Temp 97.4  F (36.3  C) (Tympanic)   Resp 15   Ht 1.6 m (5' 3\")   Wt 70.3 kg (155 lb)   SpO2 95%   BMI 27.46 kg/m   Estimated body mass index is 27.46 kg/m  as calculated from the following:    Height as of this encounter: 1.6 m (5' 3\").    Weight as of this encounter: 70.3 kg (155 lb).  Medication Reconciliation: complete  Janett Dubose LPN  "

## 2022-08-23 ENCOUNTER — LAB (OUTPATIENT)
Dept: LAB | Facility: OTHER | Age: 72
End: 2022-08-23
Payer: MEDICARE

## 2022-08-23 DIAGNOSIS — C90.00 MULTIPLE MYELOMA NOT HAVING ACHIEVED REMISSION (H): ICD-10-CM

## 2022-08-23 LAB
ALBUMIN SERPL-MCNC: 3.7 G/DL (ref 3.4–5)
ALP SERPL-CCNC: 127 U/L (ref 40–150)
ALT SERPL W P-5'-P-CCNC: 19 U/L (ref 0–50)
ANION GAP SERPL CALCULATED.3IONS-SCNC: 5 MMOL/L (ref 3–14)
AST SERPL W P-5'-P-CCNC: 17 U/L (ref 0–45)
BASOPHILS # BLD AUTO: 0 10E3/UL (ref 0–0.2)
BASOPHILS NFR BLD AUTO: 0 %
BILIRUB SERPL-MCNC: 0.8 MG/DL (ref 0.2–1.3)
BUN SERPL-MCNC: 13 MG/DL (ref 7–30)
CALCIUM SERPL-MCNC: 8.9 MG/DL (ref 8.5–10.1)
CHLORIDE BLD-SCNC: 104 MMOL/L (ref 94–109)
CO2 SERPL-SCNC: 26 MMOL/L (ref 20–32)
CREAT SERPL-MCNC: 0.77 MG/DL (ref 0.52–1.04)
EOSINOPHIL # BLD AUTO: 0.2 10E3/UL (ref 0–0.7)
EOSINOPHIL NFR BLD AUTO: 3 %
ERYTHROCYTE [DISTWIDTH] IN BLOOD BY AUTOMATED COUNT: 13.2 % (ref 10–15)
GFR SERPL CREATININE-BSD FRML MDRD: 81 ML/MIN/1.73M2
GLUCOSE BLD-MCNC: 110 MG/DL (ref 70–99)
HCT VFR BLD AUTO: 42.1 % (ref 35–47)
HGB BLD-MCNC: 14.5 G/DL (ref 11.7–15.7)
IMM GRANULOCYTES # BLD: 0 10E3/UL
IMM GRANULOCYTES NFR BLD: 1 %
LYMPHOCYTES # BLD AUTO: 1.7 10E3/UL (ref 0.8–5.3)
LYMPHOCYTES NFR BLD AUTO: 34 %
MCH RBC QN AUTO: 31.3 PG (ref 26.5–33)
MCHC RBC AUTO-ENTMCNC: 34.4 G/DL (ref 31.5–36.5)
MCV RBC AUTO: 91 FL (ref 78–100)
MONOCYTES # BLD AUTO: 0.5 10E3/UL (ref 0–1.3)
MONOCYTES NFR BLD AUTO: 10 %
NEUTROPHILS # BLD AUTO: 2.6 10E3/UL (ref 1.6–8.3)
NEUTROPHILS NFR BLD AUTO: 52 %
NRBC # BLD AUTO: 0 10E3/UL
NRBC BLD AUTO-RTO: 0 /100
PLATELET # BLD AUTO: 204 10E3/UL (ref 150–450)
POTASSIUM BLD-SCNC: 3.9 MMOL/L (ref 3.4–5.3)
PROT SERPL-MCNC: 7.1 G/DL (ref 6.8–8.8)
RBC # BLD AUTO: 4.64 10E6/UL (ref 3.8–5.2)
SODIUM SERPL-SCNC: 135 MMOL/L (ref 133–144)
TOTAL PROTEIN SERUM FOR ELP: 6.4 G/DL (ref 6.4–8.3)
WBC # BLD AUTO: 5 10E3/UL (ref 4–11)

## 2022-08-23 PROCEDURE — 36415 COLL VENOUS BLD VENIPUNCTURE: CPT | Mod: ZL

## 2022-08-23 PROCEDURE — 84155 ASSAY OF PROTEIN SERUM: CPT | Mod: ZL

## 2022-08-23 PROCEDURE — 83521 IG LIGHT CHAINS FREE EACH: CPT | Mod: ZL

## 2022-08-23 PROCEDURE — 84165 PROTEIN E-PHORESIS SERUM: CPT | Mod: ZL | Performed by: PATHOLOGY

## 2022-08-23 PROCEDURE — 85025 COMPLETE CBC W/AUTO DIFF WBC: CPT | Mod: ZL

## 2022-08-23 PROCEDURE — 86334 IMMUNOFIX E-PHORESIS SERUM: CPT | Mod: ZL | Performed by: PATHOLOGY

## 2022-08-23 PROCEDURE — 85810 BLOOD VISCOSITY EXAMINATION: CPT | Mod: ZL

## 2022-08-23 PROCEDURE — 80053 COMPREHEN METABOLIC PANEL: CPT | Mod: ZL

## 2022-08-23 PROCEDURE — 82040 ASSAY OF SERUM ALBUMIN: CPT | Mod: ZL

## 2022-08-23 PROCEDURE — 82784 ASSAY IGA/IGD/IGG/IGM EACH: CPT | Mod: ZL

## 2022-08-23 PROCEDURE — 82232 ASSAY OF BETA-2 PROTEIN: CPT | Mod: ZL

## 2022-08-24 LAB
ALBUMIN SERPL ELPH-MCNC: 4 G/DL (ref 3.7–5.1)
ALPHA1 GLOB SERPL ELPH-MCNC: 0.3 G/DL (ref 0.2–0.4)
ALPHA2 GLOB SERPL ELPH-MCNC: 0.7 G/DL (ref 0.5–0.9)
B-GLOBULIN SERPL ELPH-MCNC: 0.6 G/DL (ref 0.6–1)
B2 MICROGLOB TUMOR MARKER SER-MCNC: 2.6 MG/L
GAMMA GLOB SERPL ELPH-MCNC: 0.7 G/DL (ref 0.7–1.6)
IGA SERPL-MCNC: 16 MG/DL (ref 84–499)
IGG SERPL-MCNC: 855 MG/DL (ref 610–1616)
IGM SERPL-MCNC: 19 MG/DL (ref 35–242)
KAPPA LC FREE SER-MCNC: 0.66 MG/DL (ref 0.33–1.94)
KAPPA LC FREE/LAMBDA FREE SER NEPH: 2.13 {RATIO} (ref 0.26–1.65)
LAMBDA LC FREE SERPL-MCNC: 0.31 MG/DL (ref 0.57–2.63)
M PROTEIN SERPL ELPH-MCNC: 0.3 G/DL
PROT PATTERN SERPL ELPH-IMP: ABNORMAL
PROT PATTERN SERPL IFE-IMP: NORMAL
VISC SER: 1.5 CP (ref 1.4–1.8)

## 2022-08-24 PROCEDURE — 84165 PROTEIN E-PHORESIS SERUM: CPT | Mod: 26

## 2022-08-24 PROCEDURE — 86334 IMMUNOFIX E-PHORESIS SERUM: CPT

## 2022-08-25 ENCOUNTER — TELEPHONE (OUTPATIENT)
Dept: FAMILY MEDICINE | Facility: OTHER | Age: 72
End: 2022-08-25

## 2022-08-25 ENCOUNTER — NURSE TRIAGE (OUTPATIENT)
Dept: FAMILY MEDICINE | Facility: OTHER | Age: 72
End: 2022-08-25

## 2022-08-25 NOTE — TELEPHONE ENCOUNTER
"Scheduled to start chemo next Tuesday. Would like to be seen for possible strep.  Protocol: see in office today    Request sent to work patient into schedule      Reason for Disposition    Patient wants to be seen    Additional Information    Negative: SEVERE difficulty breathing (e.g., struggling for each breath, speaks in single words)    Negative: Sounds like a life-threatening emergency to the triager    Negative: Throat culture results, call about    Negative: Productive cough is main symptom    Negative: Runny nose is main symptom    Negative: Drooling or spitting out saliva (because can't swallow)    Negative: Unable to open mouth completely    Negative: Drinking very little and has signs of dehydration (e.g., no urine > 12 hours, very dry mouth, very lightheaded)    Negative: Patient sounds very sick or weak to the triager    Negative: Difficulty breathing (per caller) but not severe    Negative: Fever > 103 F (39.4 C)    Negative: Refuses to drink anything for > 12 hours    Answer Assessment - Initial Assessment Questions  1. ONSET: \"When did the throat start hurting?\" (Hours or days ago)       Approx one week ago  2. SEVERITY: \"How bad is the sore throat?\" (Scale 1-10; mild, moderate or severe)    - MILD (1-3):  doesn't interfere with eating or normal activities    - MODERATE (4-7): interferes with eating some solids and normal activities    - SEVERE (8-10):  excruciating pain, interferes with most normal activities    - SEVERE DYSPHAGIA: can't swallow liquids, drooling      Mild to moderate  3. STREP EXPOSURE: \"Has there been any exposure to strep within the past week?\" If Yes, ask: \"What type of contact occurred?\"       no  4.  VIRAL SYMPTOMS: \"Are there any symptoms of a cold, such as a runny nose, cough, hoarse voice or red eyes?\"       Achy. Nasal congestion. Slight cough  5. FEVER: \"Do you have a fever?\" If Yes, ask: \"What is your temperature, how was it measured, and when did it start?\"      Felt " "like last night.  Feels cool this a.m.  6. PUS ON THE TONSILS: \"Is there pus on the tonsils in the back of your throat?\"      Has not looked  7. OTHER SYMPTOMS: \"Do you have any other symptoms?\" (e.g., difficulty breathing, headache, rash)      Slight R side earache  8. PREGNANCY: \"Is there any chance you are pregnant?\" \"When was your last menstrual period?\"      no    Protocols used: SORE THROAT-A-OH      "

## 2022-08-25 NOTE — TELEPHONE ENCOUNTER
12:19 PM    Reason for Call: Phone Call    Description: Carmelita called stating that she does not think she needs the overbook sent earlier today.     Was an appointment offered for this call? No  If yes : Appointment type              Date    Preferred method for responding to this message: Telephone Call  What is your phone number ?  [phone]        Janna Arriaga

## 2022-08-26 ENCOUNTER — HOSPITAL ENCOUNTER (EMERGENCY)
Facility: HOSPITAL | Age: 72
Discharge: HOME | End: 2022-08-26
Attending: NURSE PRACTITIONER | Admitting: NURSE PRACTITIONER
Payer: MEDICARE

## 2022-08-26 ENCOUNTER — APPOINTMENT (OUTPATIENT)
Dept: GENERAL RADIOLOGY | Facility: HOSPITAL | Age: 72
End: 2022-08-26
Attending: NURSE PRACTITIONER
Payer: MEDICARE

## 2022-08-26 VITALS
SYSTOLIC BLOOD PRESSURE: 132 MMHG | RESPIRATION RATE: 16 BRPM | HEART RATE: 90 BPM | TEMPERATURE: 98.5 F | OXYGEN SATURATION: 95 % | DIASTOLIC BLOOD PRESSURE: 81 MMHG

## 2022-08-26 DIAGNOSIS — J06.9 UPPER RESPIRATORY TRACT INFECTION, UNSPECIFIED TYPE: ICD-10-CM

## 2022-08-26 DIAGNOSIS — J06.9 URI (UPPER RESPIRATORY INFECTION): ICD-10-CM

## 2022-08-26 PROCEDURE — 99213 OFFICE O/P EST LOW 20 MIN: CPT | Performed by: NURSE PRACTITIONER

## 2022-08-26 PROCEDURE — G0463 HOSPITAL OUTPT CLINIC VISIT: HCPCS | Mod: 25

## 2022-08-26 PROCEDURE — 71046 X-RAY EXAM CHEST 2 VIEWS: CPT

## 2022-08-26 ASSESSMENT — ENCOUNTER SYMPTOMS
CHILLS: 0
DIARRHEA: 0
DIZZINESS: 0
NAUSEA: 0
LIGHT-HEADEDNESS: 0
SORE THROAT: 1
SINUS PRESSURE: 0
FATIGUE: 1
SINUS PAIN: 0
FEVER: 1
COUGH: 1
SHORTNESS OF BREATH: 0
APPETITE CHANGE: 0
CHEST TIGHTNESS: 1
RHINORRHEA: 1
VOMITING: 0
ACTIVITY CHANGE: 1
HEADACHES: 1
EYES NEGATIVE: 1
MYALGIAS: 0

## 2022-08-26 ASSESSMENT — ACTIVITIES OF DAILY LIVING (ADL): ADLS_ACUITY_SCORE: 35

## 2022-08-26 NOTE — DISCHARGE INSTRUCTIONS
Increase oral intake, cool mist vaporizer as needed, rest, avoid sharing utensils, practice good hand washing techniques, cover mouth when you cough and sneeze.  Over the counter medications such as ibuprofen and/or acetaminophen for fever and generalized aches and pains. Ibuprofen 400 to 800 mg (2 - 4 tabs of over the counter med) every six to eight hours as needed;not to exceed maximum amount of 3200 mg in 24 hours.Tylenol 650 to 1000 mg every four to six hours as needed (not to exceed more than 4000 mg in a 24 hour period). May use interchangeably. Robitussin (guaifenesin) for cough. Chest rubs such as Yuval's or Mentholatum may help reduce sore throat symptoms.  Chloraseptic spray for sore throat or menthol lozenges may be helpful for sore throat. Be reevaluated if symptoms persist longer than 10 - 14 days or worsen and if there is no improvement in 72 hours or worsening of symptoms.  Increase fluids.   Fluids, herbs, and foods for sore throat relief -- Adjusting the temperature and texture of foods and beverages may provide local relief of sore throat pain. While data showing benefit are quite limited, these approaches are intuitive. We typically advise these measures since they are likely to be safe with minimal adverse effect, and patients often describe relief of symptoms.  We suggest hydration with frozen (eg, ice or popsicles) or heated liquids (eg, teas, soups), rather than room temperature or refrigerated fluids in patient with significant sore throat pain. Very cold foods can have a numbing-like effect that temporarily reduces or alleviates the pain of swallowing. Ice cubes or frozen popsicles facilitate hydration; ice cream and frozen yogurt provide caloric intake.  Warm fluids and foods, including teas, soups, and soft non-irritating foods, may be better tolerated by patients with throat pain than irritating foods (eg, rough-textured or spicy foods) or fluids at room temperatures. Foods that coat the  throat, including honey and hard candies, can facilitate intake of calories while temporarily relieving throat pain.

## 2022-08-26 NOTE — ED PROVIDER NOTES
History     Chief Complaint   Patient presents with     Cough     HPI  Carmelita Watts is a 71 year old female who has a 10-day history of fatigue, fever, sore throat, cough, headache, and chest tightness.  Symptoms have been slowly resolving.  Takes Tylenol at night that has helped her sleep.  No known sick contacts.  Has had 2 negative home COVID test.  Had second COVID vaccine booster April, 2022.  Non-smoker.  History of multiple myeloma and is supposed to restart her chemo on Tuesday.  Denies chills, nausea, vomiting, diarrhea, shortness of breath, body aches, lightheadedness, and dizziness.    Allergies:  Allergies   Allergen Reactions     Lisinopril Cough     Phenylephrine Hcl Other (See Comments)     **Entex  HEADACHE (SEVERE)     Phenylpropanolamine Other (See Comments)     **Entex  HEADACHE (SEVERE)     Pseudoephedrine Tannate Other (See Comments)     **Entex  HEADACHE (SEVERE)     Levofloxacin Rash     **Levaquin     Moxifloxacin Hcl [Avelox] Rash       Problem List:    Patient Active Problem List    Diagnosis Date Noted     SOB (shortness of breath) 09/20/2021     Priority: Medium     Diabetes mellitus, type 2 (H) 01/18/2021     Priority: Medium     Multiple myeloma not having achieved remission (H) 06/24/2019     Priority: Medium     ACP (advance care planning) 10/26/2016     Priority: Medium     Advance Care Planning 10/26/2016: ACP Review of Chart / Resources Provided:  Reviewed chart for advance care plan.  Carmelita Watts has no plan or code status on file. Discussed available resources and provided with information. Confirmed code status reflects current choices pending further ACP discussions.  Confirmed/documented legally designated decision makers.  Added by Alison Landeros             Major depressive disorder, recurrent episode, mild (H) 10/01/2015     Priority: Medium     Seasonal allergies 10/01/2015     Priority: Medium     Mixed hyperlipidemia 10/01/2015     Priority: Medium      "Hyperlipidemia LDL goal <130 07/05/2013     Priority: Medium     Hypertension goal BP (blood pressure) < 140/80 07/05/2013     Priority: Medium     Advanced care planning/counseling discussion 07/09/2012     Priority: Medium        Past Medical History:    Past Medical History:   Diagnosis Date     Arthritis      Cyst of breast      Depressive disorder      Diabetes mellitus, type 2 (H) 1/18/2021     Essential hypertension 10/1/2015     Major depressive disorder, recurrent episode, mild (H) 10/1/2015     Mixed hyperlipidemia 10/1/2015     Multiple myeloma not having achieved remission (H) 6/24/2019     Other specified disorders of bladder 07/09/2012     Seasonal allergies 10/1/2015     Unspecified essential hypertension 03/19/2007     Unspecified sinusitis (chronic) 09/05/2007       Past Surgical History:    Past Surgical History:   Procedure Laterality Date     APPENDECTOMY       BONE MARROW BIOPSY, BONE SPECIMEN, NEEDLE/TROCAR N/A 6/18/2019    Procedure: BONE MARROW BIOPSY;  Surgeon: Maciej Sanz MD;  Location: [facility]     CHOLECYSTECTOMY       COLONOSCOPY  07-    repeat 10 years     COLONOSCOPY N/A 12/30/2016    Procedure: COLONOSCOPY;  Surgeon: Bhaskar Franklin DO;  Location: [facility]     PUNC/ASPIR BREAST CYST Left 1995    benign     SINUS SURGERY       TUBAL STERILIZATION         Family History:    Family History   Problem Relation Age of Onset     Breast Cancer Mother 60        Cause of Death     Parkinsonism Father         \"Possible\"     Coronary Artery Disease Father      Pacemaker Father      Thyroid Disease Daughter      Breast Cancer Maternal Aunt         over 50     Diabetes No family hx of      Hypertension No family hx of      Hyperlipidemia No family hx of      Cerebrovascular Disease No family hx of      Colon Cancer No family hx of      Prostate Cancer No family hx of      Genetic Disorder No family hx of      Asthma No family hx of      Anesthesia Reaction No family hx of        Social " History:  Marital Status:   [5]  Social History     Tobacco Use     Smoking status: Never Smoker     Smokeless tobacco: Never Used     Tobacco comment: Tried to Quit (YES); QUIT in 1971; Passive Exposure (NO)   Substance Use Topics     Alcohol use: Yes     Comment: RARELY     Drug use: No        Medications:    acyclovir (ZOVIRAX) 400 MG tablet  aspirin (ASA) 81 MG chewable tablet  atorvastatin (LIPITOR) 10 MG tablet  cetirizine (ZYRTEC) 10 MG tablet  dexamethasone (DECADRON) 4 MG tablet  EPINEPHrine (EPIPEN) 0.3 MG/0.3ML injection  fluticasone (FLONASE) 50 MCG/ACT nasal spray  L-Lysine HCl 500 MG CAPS  losartan (COZAAR) 50 MG tablet  prochlorperazine (COMPAZINE) 10 MG tablet  sertraline (ZOLOFT) 50 MG tablet  sulfacetamide-prednisoLONE (VASOCIDIN) 10-0.23 % ophthalmic solution          Review of Systems   Constitutional: Positive for activity change, fatigue and fever. Negative for appetite change and chills.   HENT: Positive for ear pain (left ear was evaluated two weeks ago. received ear drops and has used them on the right ear), rhinorrhea and sore throat (resolved). Negative for sinus pressure and sinus pain.    Eyes: Negative.    Respiratory: Positive for cough and chest tightness. Negative for shortness of breath.    Cardiovascular: Negative for chest pain.   Gastrointestinal: Negative for diarrhea, nausea and vomiting.   Genitourinary: Negative.    Musculoskeletal: Negative for myalgias.   Skin: Negative.    Neurological: Positive for headaches. Negative for dizziness and light-headedness.       Physical Exam   BP: 132/81  Pulse: 90  Temp: 98.5  F (36.9  C)  Resp: 16  SpO2: 95 %      Physical Exam  Vitals and nursing note reviewed.   Constitutional:       General: She is not in acute distress.     Appearance: She is normal weight.   HENT:      Head: Normocephalic.      Right Ear: Tympanic membrane and ear canal normal.      Left Ear: Tympanic membrane and ear canal normal.      Nose: Mucosal edema  present.      Right Turbinates: Enlarged.      Left Turbinates: Enlarged.      Right Sinus: No maxillary sinus tenderness or frontal sinus tenderness.      Left Sinus: No maxillary sinus tenderness or frontal sinus tenderness.      Mouth/Throat:      Lips: Pink.      Mouth: Mucous membranes are moist.      Pharynx: Uvula midline. Posterior oropharyngeal erythema (Mild) present.      Tonsils: 1+ on the right.   Eyes:      Conjunctiva/sclera: Conjunctivae normal.   Cardiovascular:      Rate and Rhythm: Normal rate and regular rhythm.      Heart sounds: Normal heart sounds. No murmur heard.  Pulmonary:      Effort: Pulmonary effort is normal. No respiratory distress.      Breath sounds: Normal breath sounds. No wheezing.   Lymphadenopathy:      Cervical: No cervical adenopathy.   Skin:     General: Skin is warm and dry.   Neurological:      Mental Status: She is alert and oriented to person, place, and time.   Psychiatric:         Behavior: Behavior normal.         ED Course                 Procedures           Results for orders placed or performed during the hospital encounter of 08/26/22 (from the past 24 hour(s))   Chest XR,  PA & LAT    Narrative    PROCEDURE:  XR CHEST 2 VIEWS    HISTORY: uri for ten days: improving. chest tightness and cough. hx  multiple myeloma. non smoker, .    COMPARISON:  12/31/2018    FINDINGS:  The cardiomediastinal contours are stable. The trachea is midline.  There is calcific aortic atherosclerosis.  No focal consolidation, effusion or pneumothorax.    No subdiaphragmatic free air.      Impression    IMPRESSION:      No focal consolidation.      ANNETTA HERNANDEZ MD         SYSTEM ID:  [id number]       Medications - No data to display    Assessments & Plan (with Medical Decision Making)     I have reviewed the nursing notes.    I have reviewed the findings, diagnosis, plan and need for follow up with the patient.  (J06.9) URI (upper respiratory infection)  Comment: 71 year old female  who has a 10-day history of fatigue, fever, sore throat, cough, headache, and chest tightness.  Symptoms have been slowly resolving.  Takes Tylenol at night that has helped her sleep.  No known sick contacts.  Has had 2 negative home COVID test.  Had second COVID vaccine booster April, 2022.  Non-smoker.  History of multiple myeloma and is supposed to restart her chemo on Tuesday.  Denies chills, nausea, vomiting, diarrhea, shortness of breath, body aches, lightheadedness, and dizziness.    MDM:NHT. Lungs CTA    Chest x-ray reviewed and per radiology; no focal consolidation.    Plan: Education provided for viral URI.  Treat symptoms conservatively with acetaminophen and  ibuprofen (if applicable) for fevers, body aches, and headaches, guaifenesin and/or honey for cough. May use chest rubs for sore throat and congestion, hot and cold liquids may help decrease sore throat and help you feel better. Increase fluids. Return to be reevaluated by ER/UC or your primary care provider if symptoms worsen, you develop breathing difficulties, or you do not improve in a reasonable time frame. It can take several days for a cough to resolve. It can take ten to fourteen days for upper respiratory symptoms to resolve.   These discharge instructions and medications were reviewed with her and understanding verbalized.    This document was prepared using a combination of typing and voice generated software.  While every attempt was made for accuracy, spelling and grammatical errors may exist.    Discharge Medication List as of 8/26/2022 12:11 PM          Final diagnoses:   URI (upper respiratory infection)       8/26/2022   [facility]       Candice Stover, HANNA  08/26/22 2052

## 2022-08-26 NOTE — ED TRIAGE NOTES
Pt presents with cough for a week. Pt states had fever yesterday. Pt took two home covid tests that were negative.

## 2022-08-29 NOTE — PROGRESS NOTES
"Oncology Follow-up Visit    Reason for Visit:  Carmelita is a 71 year old woman with a diagnosis of multiple myeloma, who I am visiting with virtually today. She is in the midst of a treatment holiday.     Nursing Note and documentation reviewed: Yes    Interval History:   Patient was scheduled to come to the clinic but has been dealing with a viral URI so appt was switched to a virtual exam. Covid tests at home have been negative. Was seen in ED last week with CXR, no evidence of pneumonia. Had one day of fevers last week but none since. Still coughing some but feeling better. Feels she is \"on the tail end.\"     No bleeding concerns. No nausea or vomiting. Appetite is okay, she is attempting to loose a little weight so watching what she eats. Bowels regular. Energy levels (except for these last couple weeks) have improved. She is even becoming involved with the [employer] group and doing craft projects with kids. Overall, seems to be doing well in regards to her myeloma.      Oncologic History  12/31/2018 Presented to the emergency room with vertigo and fatigue.  CT scan of the head was negative and subsequent stress test was negative.  5/3/2019 PCP ordered lab work and noted a total protein of 12.9.  SPEP at that time showed an M spike of 6.2 with a large monoclonal protein seen in the gamma fraction. Urine immunofixation showed a possible small protein band in the gamma fraction  5/31/2019 Evaluated by Dr. Walker with Medical Oncology with plan to rule out myeloma and obtain bone marrow aspiration biopsy as well as a metastatic bone survey along with additional labwork  6/18/2019 Underwent bone marrow aspiration and biopsy  6/24/2019 Seen again by Dr. Walker and CBC showed a hemoglobin of 9.3, M spike 7.3 with monoclonal IgG immunoglobulin of kappa light chain type; serum viscosity was 2.9; quantitative immunoglobulins showed an IgG of 8160, beta-2 microglobulin was 5.8, BUN was 21 with creatinine " is 0.8 and total protein was 13.7.  Quantitative kappa/lambda free light chains showed an elevated ratio of 17.0 bone marrow aspiration biopsy showed plasma cell myeloma with approximately 80% plasma cells.  Immunofixation showed IgG kappa and flow cytometry revealed kappa monotypic plasma cells consistent with clonal plasma cell neoplasm and FISH panel was pending at that time.  It was felt she had at least stage II disease based on her beta-2 microglobulin and anemia.  Plan was to treat with Velcade 1.3 mg per/m2 days 1, 4, 8 and 11/Decadron 40 mg on days 1, 8 and 15. Initiation of Revlimid with C2 at 25 mg daily days 1 through 14.  Plan was to also obtain an MRI of the lumbar spine to rule out lytic lesion at L3.  She was initiated on Zovirax 400 mg p.o. twice daily.  6/25/2019 C1 of chemotherapy initiated  7/1/2019 Note in chart regarding patient's large co-pay for the Revlimid and no plan at this point to initiate Revlimid and treat only with Velcade and Decadron per Dr. Walker  7/11/2019 MRI lumbar spine shows a pathologic superior endplate compression fracture at L3 without evidence of retropulsed fragment and innumerable enhancing lesions throughout the lumbar spine consistent with history of multiple myeloma.  9/10/2019 Increasing M-spike and kappa lambda ratio  10/1/2019 Initiation of CyBorD  11/17/2020 Plateau of M-spike at 1.2 after 36 cycles of CyBorD  12/1/2020 Initiation of Darzalex Faspro injection  3/23/2021 M-spike increased form 1.0 to 1.2 and velcade and dexamethasone added to plan  4/12/2022 Treatment HOLIDAY commenced with Mspike 0.3.   05/27/2022 Received Evusheld while on treatment holiday.      Current Chemo Regime/TX: Darzalex faspro injection 1800mg subcutaneous per protocol on Day 1 with velcade 1.3mg/m2 on days 1, 4, 8, 11 with weekly Dexamethasone, given every 21 days      **Zometa 4mg every 3 months  Current Cycle: Treatment holiday  # of completed cycles:  22  **Velcade & Dex added  "C5     Previous treatment:   Velcade 1.3 mg/m2 days 1, 4, 8 and 11 with Decadron 40 mg days 1, 8 and 15 x 4 cycles;   Velcade 1.5mg.m2/cyclophosphamide 150mg every 7 days on days 1,8,15 and 22/decadron 40mg days 1,8,15,22 ; Darzalex faspro injection 1800mg subcutaneous per protocol    Past Medical History:   Diagnosis Date     Arthritis      Cyst of breast      Depressive disorder      Diabetes mellitus, type 2 (H) 1/18/2021     Essential hypertension 10/1/2015     Major depressive disorder, recurrent episode, mild (H) 10/1/2015     Mixed hyperlipidemia 10/1/2015     Multiple myeloma not having achieved remission (H) 6/24/2019     Other specified disorders of bladder 07/09/2012    Irritable Bladder     Seasonal allergies 10/1/2015     Unspecified essential hypertension 03/19/2007     Unspecified sinusitis (chronic) 09/05/2007       Past Surgical History:   Procedure Laterality Date     APPENDECTOMY       BONE MARROW BIOPSY, BONE SPECIMEN, NEEDLE/TROCAR N/A 6/18/2019    Procedure: BONE MARROW BIOPSY;  Surgeon: Maciej Sanz MD;  Location: [facility]     CHOLECYSTECTOMY       COLONOSCOPY  07-    repeat 10 years     COLONOSCOPY N/A 12/30/2016    Procedure: COLONOSCOPY;  Surgeon: Bhaskar Franklin DO;  Location: [facility]     PUNC/ASPIR BREAST CYST Left 1995    benign     SINUS SURGERY       TUBAL STERILIZATION         Family History   Problem Relation Age of Onset     Breast Cancer Mother 60        Cause of Death     Parkinsonism Father         \"Possible\"     Coronary Artery Disease Father      Pacemaker Father      Thyroid Disease Daughter      Breast Cancer Maternal Aunt         over 50     Diabetes No family hx of      Hypertension No family hx of      Hyperlipidemia No family hx of      Cerebrovascular Disease No family hx of      Colon Cancer No family hx of      Prostate Cancer No family hx of      Genetic Disorder No family hx of      Asthma No family hx of      Anesthesia Reaction No family hx of  "       Social History     Socioeconomic History     Marital status:      Spouse name: Not on file     Number of children: Not on file     Years of education: Not on file     Highest education level: Not on file   Occupational History     Occupation: Financial     Comment:  - ([employer])   Tobacco Use     Smoking status: Never Smoker     Smokeless tobacco: Never Used     Tobacco comment: Tried to Quit (YES); QUIT in 1971; Passive Exposure (NO)   Substance and Sexual Activity     Alcohol use: Yes     Comment: RARELY     Drug use: No     Sexual activity: Yes     Partners: Male     Birth control/protection: None   Other Topics Concern      Service Not Asked     Blood Transfusions Not Asked     Caffeine Concern Yes     Comment: Coffee >6 cups daily     Occupational Exposure Not Asked     Hobby Hazards Not Asked     Sleep Concern Not Asked     Stress Concern Not Asked     Weight Concern Not Asked     Special Diet Not Asked     Back Care Not Asked     Exercise Not Asked     Bike Helmet Not Asked     Seat Belt Not Asked     Self-Exams Not Asked     Parent/sibling w/ CABG, MI or angioplasty before 65F 55M? No   Social History Narrative     Not on file     Social Determinants of Health     Financial Resource Strain: Not on file   Food Insecurity: Not on file   Transportation Needs: Not on file   Physical Activity: Not on file   Stress: Not on file   Social Connections: Not on file   Intimate Partner Violence: Not on file   Housing Stability: Not on file       Current Outpatient Medications   Medication     aspirin (ASA) 81 MG chewable tablet     atorvastatin (LIPITOR) 10 MG tablet     cetirizine (ZYRTEC) 10 MG tablet     fluticasone (FLONASE) 50 MCG/ACT nasal spray     losartan (COZAAR) 50 MG tablet     sertraline (ZOLOFT) 50 MG tablet     acyclovir (ZOVIRAX) 400 MG tablet     dexamethasone (DECADRON) 4 MG tablet     EPINEPHrine (EPIPEN) 0.3 MG/0.3ML injection     L-Lysine HCl 500 MG CAPS      prochlorperazine (COMPAZINE) 10 MG tablet     sulfacetamide-prednisoLONE (VASOCIDIN) 10-0.23 % ophthalmic solution     No current facility-administered medications for this visit.        Allergies   Allergen Reactions     Lisinopril Cough     Phenylephrine Hcl Other (See Comments)     **Entex  HEADACHE (SEVERE)     Phenylpropanolamine Other (See Comments)     **Entex  HEADACHE (SEVERE)     Pseudoephedrine Tannate Other (See Comments)     **Entex  HEADACHE (SEVERE)     Levofloxacin Rash     **Levaquin     Moxifloxacin Hcl [Avelox] Rash       Review Of Systems:  A complete review of systems is negative except for the above mentioned items in the interval history.     Physical Exam:  Deferred given virtual nature of this visit.     Laboratory:   Component      Latest Ref Rng & Units 8/23/2022   WBC      4.0 - 11.0 10e3/uL 5.0   RBC Count      3.80 - 5.20 10e6/uL 4.64   Hemoglobin      11.7 - 15.7 g/dL 14.5   Hematocrit      35.0 - 47.0 % 42.1   MCV      78 - 100 fL 91   MCH      26.5 - 33.0 pg 31.3   MCHC      31.5 - 36.5 g/dL 34.4   RDW      10.0 - 15.0 % 13.2   Platelet Count      150 - 450 10e3/uL 204   % Neutrophils      % 52   % Lymphocytes      % 34   % Monocytes      % 10   % Eosinophils      % 3   % Basophils      % 0   % Immature Granulocytes      % 1   NRBCs per 100 WBC      <1 /100 0   Absolute Neutrophils      1.6 - 8.3 10e3/uL 2.6   Absolute Lymphocytes      0.8 - 5.3 10e3/uL 1.7   Absolute Monocytes      0.0 - 1.3 10e3/uL 0.5   Absolute Eosinophils      0.0 - 0.7 10e3/uL 0.2   Absolute Basophils      0.0 - 0.2 10e3/uL 0.0   Absolute Immature Granulocytes      <=0.4 10e3/uL 0.0   Absolute NRBCs      10e3/uL 0.0   Sodium      133 - 144 mmol/L 135   Potassium      3.4 - 5.3 mmol/L 3.9   Chloride      94 - 109 mmol/L 104   Carbon Dioxide      20 - 32 mmol/L 26   Anion Gap      3 - 14 mmol/L 5   Urea Nitrogen      7 - 30 mg/dL 13   Creatinine      0.52 - 1.04 mg/dL 0.77   Calcium      8.5 - 10.1 mg/dL 8.9    Glucose      70 - 99 mg/dL 110 (H)   Alkaline Phosphatase      40 - 150 U/L 127   AST      0 - 45 U/L 17   ALT      0 - 50 U/L 19   Protein Total      6.8 - 8.8 g/dL 7.1   Albumin      3.4 - 5.0 g/dL 3.7   Bilirubin Total      0.2 - 1.3 mg/dL 0.8   GFR Estimate      >60 mL/min/1.73m2 81   Albumin Fraction      3.7 - 5.1 g/dL 4.0   Alpha 1 Fraction      0.2 - 0.4 g/dL 0.3   Alpha 2 Fraction      0.5 - 0.9 g/dL 0.7   Beta Fraction      0.6 - 1.0 g/dL 0.6   Gamma Fraction      0.7 - 1.6 g/dL 0.7   Monoclonal Peak      <=0.0 g/dL 0.3 (H)   ELP Interpretation:       Small monoclonal protein (about 0.3 g/dL) seen in the gamma fraction. See immunofixation report on same specimen. Pathologic significance requires clinical correlation. BIA Izaguirre M.D., Ph.D., Pathologist ().   IGG      610 - 1,616 mg/dL 855   IGA      84 - 499 mg/dL 16 (L)   IGM      35 - 242 mg/dL 19 (L)   [place] Free Lt Chain      0.33 - 1.94 mg/dL 0.66   Lambda Free Lt Chain      0.57 - 2.63 mg/dL 0.31 (L)   Kappa Lambda Ratio      0.26 - 1.65 2.13 (H)   Viscosity Index      1.4 - 1.8 1.5   Immunofixation ELP       Monoclonal IgG immunoglobulin of kappa light chain type. Monoclonal antibody therapeutics (e.g. Daratumumab) may appear as monoclonal proteins on serum electrophoresis and immunofixation, but are usually only very small IgG kappa monoclonals. Results should be interpreted with caution. Pathologic significance requires clinical correlation.  BIA Izaguirre M.D., Ph.D., Pathologist ()   Beta-2-Microglobulin      <2.3 mg/L 2.6 (H)   Total Protein Serum for ELP      6.4 - 8.3 g/dL 6.4       Imaging Studies:  None this visit.     ASSESSMENT/PLAN:  #1 Multiple myeloma:   IgG kappa multiple myeloma with 80% involvement of the bone marrow diagnosed June 2019 initially treated with Velcade and Decadron and received 4 cycles with increasing M-spike and kappa/lambda ratio.  Treatment changed to CyBorD on  10/1/2019.  M-spike plateau at 1.2 and treatment changed to monotherapy with Darzalex Faspro injection. M spike declined to 1.0 then increased again to 1.2 so Velcade and Dex added to her treatment plan with cycle 5 therapy. Following C22 (April 2022), she did initiate a treatment holiday when her Mspike was 0.3. Following a 6 week holiday, her mspike dropped from 0.3 to 0.2. Other labs stable. We continued treatment holiday. Today, Mspike stable at 0.3. Kappa light chains stable. IgG stable. Calcium and creatinine stable. Patient continues to feel well having been on treatment holiday. Given stable labs and her feeling well, we will continue treatment holiday seeing her back in about 6 weeks with labs prior.     #2 Lytic lesions: She remains on calcium with vitamin D twice a day. Weight bearing activity. Will resume zometa with treatment.     #3 Viral illness Was recently seen in ED. Symptoms continue to improve. Patient knows to return if symptoms worsen, although hopeful she continues to improve.     Patient in agreement with plan and verbalizes understanding. Agrees to call with any questions or concerns.    35 minutes spent in the patient's encounter today with time spent in review of patient's chart along with chart preparation and review of the treatment plan and signing of treatment plan.  Time was also spent with the patient in obtaining a review of systems and performing a physical exam along with detailed review of all test results. Time was also spent in discussing plan for future follow-up and relating instructions for follow-up and in placing future orders.    FLO Duque, FNP-C

## 2022-08-30 ENCOUNTER — VIRTUAL VISIT (OUTPATIENT)
Dept: ONCOLOGY | Facility: OTHER | Age: 72
End: 2022-08-30
Attending: NURSE PRACTITIONER
Payer: COMMERCIAL

## 2022-08-30 VITALS — BODY MASS INDEX: 27.46 KG/M2 | WEIGHT: 155 LBS | HEIGHT: 63 IN

## 2022-08-30 DIAGNOSIS — M89.9 BONE LESION: ICD-10-CM

## 2022-08-30 DIAGNOSIS — C90.00 MULTIPLE MYELOMA NOT HAVING ACHIEVED REMISSION (H): Primary | ICD-10-CM

## 2022-08-30 PROCEDURE — 99214 OFFICE O/P EST MOD 30 MIN: CPT | Mod: TEL | Performed by: NURSE PRACTITIONER

## 2022-08-30 ASSESSMENT — PAIN SCALES - GENERAL: PAINLEVEL: NO PAIN (0)

## 2022-08-30 NOTE — PATIENT INSTRUCTIONS
Thank you for allowing me to be a part of your care today.    We will continue your treatment holiday, and I will plan on seeing you back in 6-8 weeks, with labs completed with week prior. We will continue to defer treatment, and wait to schedule any treatment until we know that that is what the plan will be.     If you have any questions please call [phone]    Other instructions:       Continue to enjoy your treatment holiday!

## 2022-08-30 NOTE — NURSING NOTE
"Oncology Rooming Note    August 30, 2022 8:14 AM   Carmelita Watts is a 71 year old female who presents for:    Chief Complaint   Patient presents with     Oncology Clinic Visit     Follow up. Multiple myeloma not having achieved remission     Initial Vitals: Ht 1.6 m (5' 3\")   Wt 70.3 kg (155 lb)   BMI 27.46 kg/m   Estimated body mass index is 27.46 kg/m  as calculated from the following:    Height as of this encounter: 1.6 m (5' 3\").    Weight as of this encounter: 70.3 kg (155 lb). Body surface area is 1.77 meters squared.  No Pain (0) Comment: Data Unavailable   No LMP recorded. Patient is postmenopausal.  Allergies reviewed: Yes  Medications reviewed: Yes    Medications: Medication refills not needed today.  Pharmacy name entered into [facility]:    [id number] - [place], [place] - [address] AT [facility] [place] [facility] [address]  [facility] - [place], [place] - [address]  [facility] - [place] - [address]              "

## 2022-09-04 ENCOUNTER — HEALTH MAINTENANCE LETTER (OUTPATIENT)
Age: 72
End: 2022-09-04

## 2022-09-09 ENCOUNTER — TELEPHONE (OUTPATIENT)
Dept: ONCOLOGY | Facility: OTHER | Age: 72
End: 2022-09-09

## 2022-09-09 NOTE — TELEPHONE ENCOUNTER
Patient called stating that oncology told her she does not need to take acyclovir anymore.  She want's to know if Ronnie is ok with this.  Please let her know.    acyclovir (ZOVIRAX) 400 MG tablet 180 tablet 3 2/17/2022  No   Sig - Route: Take 1 tablet (400 mg) by mouth 2 times daily - Oral   Sent to pharmacy as: Acyclovir 400 MG Oral Tablet (ZOVIRAX)   Class: E-Prescribe   Order: [id number]   E-Prescribing Status: Receipt confirmed by pharmacy (2/17/2022 10:54 AM CST)

## 2022-09-12 NOTE — PATIENT INSTRUCTIONS

## 2022-09-16 ENCOUNTER — TELEPHONE (OUTPATIENT)
Dept: OTOLARYNGOLOGY | Facility: OTHER | Age: 72
End: 2022-09-16

## 2022-09-16 DIAGNOSIS — H72.92 PERFORATION OF TYMPANIC MEMBRANE, LEFT: Primary | ICD-10-CM

## 2022-09-16 RX ORDER — CIPROFLOXACIN AND DEXAMETHASONE 3; 1 MG/ML; MG/ML
4 SUSPENSION/ DROPS AURICULAR (OTIC) 2 TIMES DAILY
Qty: 2.8 ML | Refills: 0 | Status: SHIPPED | OUTPATIENT
Start: 2022-09-16 | End: 2022-09-23

## 2022-09-16 NOTE — TELEPHONE ENCOUNTER
The patient has c/o left ear drainage and pain starting 2 days ago. She has been using Vasocidin drops at home, but is wondering if there is anything else she can do. She has no other associated symptoms.     [facility]

## 2022-09-16 NOTE — TELEPHONE ENCOUNTER
Change Vasocidin to Ciprodex. I sent in Ciprodex which she has used prior. She should be seen next week. For recheck. TR

## 2022-10-11 NOTE — PROGRESS NOTES
Oncology Follow-up Visit    Reason for Visit:  Carmelita is a 72 year old woman with a diagnosis of multiple myeloma, who I am visiting with today for routine follow-up.     Nursing Note and documentation reviewed:Yes    Interval History:   Patient is feeling well. No concerns today. Notes that her energy levels continue to improve. She is walking a mile a day. Teaching an art class. No recent signs of infection. No bleeding concerns. No nausea and vomiting. No bowel concerns. Some eczema but no other skin concerns. Overall doing well.     Oncologic History  12/31/2018 Presented to the emergency room with vertigo and fatigue.  CT scan of the head was negative and subsequent stress test was negative.  5/3/2019 PCP ordered lab work and noted a total protein of 12.9.  SPEP at that time showed an M spike of 6.2 with a large monoclonal protein seen in the gamma fraction. Urine immunofixation showed a possible small protein band in the gamma fraction  5/31/2019 Evaluated by Dr. Walker with Medical Oncology with plan to rule out myeloma and obtain bone marrow aspiration biopsy as well as a metastatic bone survey along with additional labwork  6/18/2019 Underwent bone marrow aspiration and biopsy  6/24/2019 Seen again by Dr. Walker and CBC showed a hemoglobin of 9.3, M spike 7.3 with monoclonal IgG immunoglobulin of kappa light chain type; serum viscosity was 2.9; quantitative immunoglobulins showed an IgG of 8160, beta-2 microglobulin was 5.8, BUN was 21 with creatinine is 0.8 and total protein was 13.7.  Quantitative kappa/lambda free light chains showed an elevated ratio of 17.0 bone marrow aspiration biopsy showed plasma cell myeloma with approximately 80% plasma cells.  Immunofixation showed IgG kappa and flow cytometry revealed kappa monotypic plasma cells consistent with clonal plasma cell neoplasm and FISH panel was pending at that time.  It was felt she had at least stage II disease based on her beta-2  microglobulin and anemia.  Plan was to treat with Velcade 1.3 mg per/m2 days 1, 4, 8 and 11/Decadron 40 mg on days 1, 8 and 15. Initiation of Revlimid with C2 at 25 mg daily days 1 through 14.  Plan was to also obtain an MRI of the lumbar spine to rule out lytic lesion at L3.  She was initiated on Zovirax 400 mg p.o. twice daily.  6/25/2019 C1 of chemotherapy initiated  7/1/2019 Note in chart regarding patient's large co-pay for the Revlimid and no plan at this point to initiate Revlimid and treat only with Velcade and Decadron per Dr. Walker  7/11/2019 MRI lumbar spine shows a pathologic superior endplate compression fracture at L3 without evidence of retropulsed fragment and innumerable enhancing lesions throughout the lumbar spine consistent with history of multiple myeloma.  9/10/2019 Increasing M-spike and kappa lambda ratio  10/1/2019 Initiation of CyBorD  11/17/2020 Plateau of M-spike at 1.2 after 36 cycles of CyBorD  12/1/2020 Initiation of Darzalex Faspro injection  3/23/2021 M-spike increased form 1.0 to 1.2 and velcade and dexamethasone added to plan  4/12/2022 Treatment HOLIDAY commenced with Mspike 0.3 after 22 cycles of treatent  05/27/2022 Received Evusheld while on treatment holiday.      Current Chemo Regime/TX: Darzalex faspro injection 1800mg subcutaneous per protocol on Day 1 with velcade 1.3mg/m2 on days 1, 4, 8, 11 with weekly Dexamethasone, given every 21 days      **Zometa 4mg every 3 months  Current Cycle: Treatment holiday     Previous treatment:   Velcade 1.3 mg/m2 days 1, 4, 8 and 11 with Decadron 40 mg days 1, 8 and 15 x 4 cycles;   Velcade 1.5mg.m2/cyclophosphamide 150mg every 7 days on days 1,8,15 and 22/decadron 40mg days 1,8,15,22 ; Darzalex faspro injection 1800mg subcutaneous per protocol    Past Medical History:   Diagnosis Date     Arthritis      Cyst of breast      Depressive disorder      Diabetes mellitus, type 2 (H) 1/18/2021     Essential hypertension 10/1/2015     Major  "depressive disorder, recurrent episode, mild (H) 10/1/2015     Mixed hyperlipidemia 10/1/2015     Multiple myeloma not having achieved remission (H) 6/24/2019     Other specified disorders of bladder 07/09/2012    Irritable Bladder     Seasonal allergies 10/1/2015     Unspecified essential hypertension 03/19/2007     Unspecified sinusitis (chronic) 09/05/2007       Past Surgical History:   Procedure Laterality Date     APPENDECTOMY       BONE MARROW BIOPSY, BONE SPECIMEN, NEEDLE/TROCAR N/A 6/18/2019    Procedure: BONE MARROW BIOPSY;  Surgeon: Maciej Sanz MD;  Location: [facility] OR     CHOLECYSTECTOMY       COLONOSCOPY  07-    repeat 10 years     COLONOSCOPY N/A 12/30/2016    Procedure: COLONOSCOPY;  Surgeon: Bhaskar Franklin DO;  Location: [facility] OR     PUNC/ASPIR BREAST CYST Left 1995    benign     SINUS SURGERY       TUBAL STERILIZATION         Family History   Problem Relation Age of Onset     Breast Cancer Mother 60        Cause of Death     Parkinsonism Father         \"Possible\"     Coronary Artery Disease Father      Pacemaker Father      Thyroid Disease Daughter      Breast Cancer Maternal Aunt         over 50     Diabetes No family hx of      Hypertension No family hx of      Hyperlipidemia No family hx of      Cerebrovascular Disease No family hx of      Colon Cancer No family hx of      Prostate Cancer No family hx of      Genetic Disorder No family hx of      Asthma No family hx of      Anesthesia Reaction No family hx of        Social History     Socioeconomic History     Marital status:      Spouse name: Not on file     Number of children: Not on file     Years of education: Not on file     Highest education level: Not on file   Occupational History     Occupation: Financial     Comment:  - (FT)   Tobacco Use     Smoking status: Never     Smokeless tobacco: Never     Tobacco comments:     Tried to Quit (YES); QUIT in 1971; Passive Exposure (NO)   Substance and Sexual " Activity     Alcohol use: Yes     Comment: RARELY     Drug use: No     Sexual activity: Yes     Partners: Male     Birth control/protection: None   Other Topics Concern      Service Not Asked     Blood Transfusions Not Asked     Caffeine Concern Yes     Comment: Coffee >6 cups daily     Occupational Exposure Not Asked     Hobby Hazards Not Asked     Sleep Concern Not Asked     Stress Concern Not Asked     Weight Concern Not Asked     Special Diet Not Asked     Back Care Not Asked     Exercise Not Asked     Bike Helmet Not Asked     Seat Belt Not Asked     Self-Exams Not Asked     Parent/sibling w/ CABG, MI or angioplasty before 65F 55M? No   Social History Narrative     Not on file     Social Determinants of Health     Financial Resource Strain: Not on file   Food Insecurity: Not on file   Transportation Needs: Not on file   Physical Activity: Not on file   Stress: Not on file   Social Connections: Not on file   Intimate Partner Violence: Not on file   Housing Stability: Not on file       Current Outpatient Medications   Medication     aspirin (ASA) 81 MG chewable tablet     atorvastatin (LIPITOR) 10 MG tablet     diphenhydrAMINE (BENADRYL) 25 MG capsule     fluticasone (FLONASE) 50 MCG/ACT nasal spray     losartan (COZAAR) 50 MG tablet     sertraline (ZOLOFT) 50 MG tablet     acyclovir (ZOVIRAX) 400 MG tablet     dexamethasone (DECADRON) 4 MG tablet     EPINEPHrine (EPIPEN) 0.3 MG/0.3ML injection     L-Lysine HCl 500 MG CAPS     prochlorperazine (COMPAZINE) 10 MG tablet     sulfacetamide-prednisoLONE (VASOCIDIN) 10-0.23 % ophthalmic solution     No current facility-administered medications for this visit.        Allergies   Allergen Reactions     Lisinopril Cough     Phenylephrine Hcl Other (See Comments)     **Entex  HEADACHE (SEVERE)     Phenylpropanolamine Other (See Comments)     **Entex  HEADACHE (SEVERE)     Pseudoephedrine Tannate Other (See Comments)     **Entex  HEADACHE (SEVERE)     Levofloxacin  "Rash     **Levaquin     Moxifloxacin Hcl [Avelox] Rash       Review Of Systems:  A complete review of systems is negative except for the above mentioned items in the interval history.     Physical Exam:  /70   Pulse 82   Temp 98  F (36.7  C) (Tympanic)   Resp 18   Ht 1.6 m (5' 3\")   Wt 72.8 kg (160 lb 7.9 oz)   SpO2 97%   BMI 28.43 kg/m    GENERAL APPEARANCE: Healthy, alert and in no acute distress.  HEENT: Eyes appear normal without scleral icterus. Extraocular movements intact.   NECK:   Supple with normal range of motion. No asymmetry or masses.  LYMPHATICS: No palpable cervical, supraclavicular, axillary, or inguinal nodes.  RESP: Lungs clear to auscultation bilaterally, respirations regular and easy.  CARDIOVASCULAR: Regular rate and rhythm. Normal S1, S2; no murmur, gallop, or rub.  ABDOMEN: Soft, non-tender, non-distended. Bowel sounds auscultated all 4 quadrants. No palpable organomegaly or masses.  MUSCULOSKELETAL: Extremities without gross deformities noted. No edema of bilateral lower extremities.  SKIN: No suspicious lesions or rashes.  NEURO: Alert and oriented x 3.  Gait steady.  PSYCHIATRIC: Mentation and affect appear normal.  Mood appropriate.      Laboratory:   Component      Latest Ref Rng & Units 10/12/2022   WBC      4.0 - 11.0 10e3/uL 5.9   RBC Count      3.80 - 5.20 10e6/uL 4.39   Hemoglobin      11.7 - 15.7 g/dL 13.8   Hematocrit      35.0 - 47.0 % 40.0   MCV      78 - 100 fL 91   MCH      26.5 - 33.0 pg 31.4   MCHC      31.5 - 36.5 g/dL 34.5   RDW      10.0 - 15.0 % 13.5   Platelet Count      150 - 450 10e3/uL 221   % Neutrophils      % 51   % Lymphocytes      % 33   % Monocytes      % 12   % Eosinophils      % 3   % Basophils      % 0   % Immature Granulocytes      % 1   NRBCs per 100 WBC      <1 /100 0   Absolute Neutrophils      1.6 - 8.3 10e3/uL 3.0   Absolute Lymphocytes      0.8 - 5.3 10e3/uL 1.9   Absolute Monocytes      0.0 - 1.3 10e3/uL 0.7   Absolute Eosinophils      " 0.0 - 0.7 10e3/uL 0.2   Absolute Basophils      0.0 - 0.2 10e3/uL 0.0   Absolute Immature Granulocytes      <=0.4 10e3/uL 0.0   Absolute NRBCs      10e3/uL 0.0   Sodium      136 - 145 mmol/L 135 (L)   Potassium      3.4 - 5.3 mmol/L 3.9   Chloride      98 - 107 mmol/L 103   Carbon Dioxide (CO2)      22 - 29 mmol/L 23   Anion Gap      7 - 15 mmol/L 9   Urea Nitrogen      8.0 - 23.0 mg/dL 18.1   Creatinine      0.51 - 0.95 mg/dL 0.76   Calcium      8.8 - 10.2 mg/dL 9.1   Glucose      70 - 99 mg/dL 110 (H)   Alkaline Phosphatase      35 - 104 U/L 134 (H)   AST      10 - 35 U/L 21   ALT      10 - 35 U/L 15   Protein Total      6.4 - 8.3 g/dL 6.6   Albumin      3.5 - 5.2 g/dL 4.1   Bilirubin Total      <=1.2 mg/dL 0.5   GFR Estimate      >60 mL/min/1.73m2 82   Albumin Fraction      3.7 - 5.1 g/dL 4.0   Alpha 1 Fraction      0.2 - 0.4 g/dL 0.3   Alpha 2 Fraction      0.5 - 0.9 g/dL 0.7   Beta Fraction      0.6 - 1.0 g/dL 0.6   Gamma Fraction      0.7 - 1.6 g/dL 0.7   Monoclonal Peak      <=0.0 g/dL 0.3 (H)   ELP Interpretation:       Monoclonal protein (0.3 g/dL) seen in the gamma fraction. See immunofixation report on same specimen. Pathologic significance requires clinical correlation. Basim Tomlinson M.D., Ph.D., Pathologist.   IGG      610 - 1,616 mg/dL 831   IGA      84 - 499 mg/dL 16 (L)   IGM      35 - 242 mg/dL 19 (L)   Coalgate Free Lt Chain      0.33 - 1.94 mg/dL 0.66   Lambda Free Lt Chain      0.57 - 2.63 mg/dL 0.32 (L)   Kappa Lambda Ratio      0.26 - 1.65 2.06 (H)   Viscosity Index      1.4 - 1.8 1.5   Immunofixation ELP       Monoclonal IgG immunoglobulin of kappa light chain type. Monoclonal antibody therapeutics (e.g. Daratumumab) may appear as monoclonal proteins on serum electrophoresis and immunofixation. Results should be interpreted with caution. Pathologic significance requires clinical correlation. Basim Tomlinson M.D., Ph.D., Pathologist   Beta-2-Microglobulin      <2.3 mg/L 2.3 (H)   Total  Protein Serum for ELP      6.4 - 8.3 g/dL 6.4       Imaging Studies:  None this visit.     ASSESSMENT/PLAN:  #1 Multiple myeloma:   IgG kappa multiple myeloma with 80% involvement of the bone marrow diagnosed June 2019 initially treated with Velcade and Decadron and received 4 cycles with increasing M-spike and kappa/lambda ratio.  Treatment changed to CyBorD on 10/1/2019.  M-spike plateau at 1.2 and treatment changed to monotherapy with Darzalex Faspro injection. M spike declined to 1.0 then increased again to 1.2 so Velcade and Dex added to her treatment plan with cycle 5 therapy. Following C22 (April 2022), she did initiate a treatment holiday when her Mspike was 0.3. Following a 6 week holiday, her mspike dropped from 0.3 to 0.2. Other labs stable. We continued treatment holiday. Her msike magy to 0.3, Kappa light chains stable, IgG stable. She remained on treatment holiday. All labs remain stable today. We will therefore continue treatment holiday. We will follow-up in 6 weeks with labs completed prior, sooner with concerns.     #2 Lytic lesions: She remains on calcium with vitamin D twice a day. Weight bearing activity. Will resume zometa with treatment. Currently on hold.    Patient in agreement with plan and verbalizes understanding. Agrees to call with any questions or concerns.    37 minutes spent in the patient's encounter today with time spent in review of patient's chart along with chart preparation and review of the treatment plan and signing of treatment plan.  Time was also spent with the patient in obtaining a review of systems and performing a physical exam along with detailed review of all test results. Time was also spent in discussing plan for future follow-up and relating instructions for follow-up and in placing future orders.    FLO Duque, FNP-C

## 2022-10-12 ENCOUNTER — LAB (OUTPATIENT)
Dept: LAB | Facility: OTHER | Age: 72
End: 2022-10-12
Attending: PHYSICIAN ASSISTANT
Payer: MEDICARE

## 2022-10-12 ENCOUNTER — IMMUNIZATION (OUTPATIENT)
Dept: FAMILY MEDICINE | Facility: OTHER | Age: 72
End: 2022-10-12
Attending: PHYSICIAN ASSISTANT
Payer: MEDICARE

## 2022-10-12 DIAGNOSIS — Z23 NEED FOR PROPHYLACTIC VACCINATION AND INOCULATION AGAINST INFLUENZA: Primary | ICD-10-CM

## 2022-10-12 DIAGNOSIS — C90.00 MULTIPLE MYELOMA NOT HAVING ACHIEVED REMISSION (H): ICD-10-CM

## 2022-10-12 LAB
ALBUMIN SERPL BCG-MCNC: 4.1 G/DL (ref 3.5–5.2)
ALP SERPL-CCNC: 134 U/L (ref 35–104)
ALT SERPL W P-5'-P-CCNC: 15 U/L (ref 10–35)
ANION GAP SERPL CALCULATED.3IONS-SCNC: 9 MMOL/L (ref 7–15)
AST SERPL W P-5'-P-CCNC: 21 U/L (ref 10–35)
BASOPHILS # BLD AUTO: 0 10E3/UL (ref 0–0.2)
BASOPHILS NFR BLD AUTO: 0 %
BILIRUB SERPL-MCNC: 0.5 MG/DL
BUN SERPL-MCNC: 18.1 MG/DL (ref 8–23)
CALCIUM SERPL-MCNC: 9.1 MG/DL (ref 8.8–10.2)
CHLORIDE SERPL-SCNC: 103 MMOL/L (ref 98–107)
CREAT SERPL-MCNC: 0.76 MG/DL (ref 0.51–0.95)
DEPRECATED HCO3 PLAS-SCNC: 23 MMOL/L (ref 22–29)
EOSINOPHIL # BLD AUTO: 0.2 10E3/UL (ref 0–0.7)
EOSINOPHIL NFR BLD AUTO: 3 %
ERYTHROCYTE [DISTWIDTH] IN BLOOD BY AUTOMATED COUNT: 13.5 % (ref 10–15)
GFR SERPL CREATININE-BSD FRML MDRD: 82 ML/MIN/1.73M2
GLUCOSE SERPL-MCNC: 110 MG/DL (ref 70–99)
HCT VFR BLD AUTO: 40 % (ref 35–47)
HGB BLD-MCNC: 13.8 G/DL (ref 11.7–15.7)
IMM GRANULOCYTES # BLD: 0 10E3/UL
IMM GRANULOCYTES NFR BLD: 1 %
LYMPHOCYTES # BLD AUTO: 1.9 10E3/UL (ref 0.8–5.3)
LYMPHOCYTES NFR BLD AUTO: 33 %
MCH RBC QN AUTO: 31.4 PG (ref 26.5–33)
MCHC RBC AUTO-ENTMCNC: 34.5 G/DL (ref 31.5–36.5)
MCV RBC AUTO: 91 FL (ref 78–100)
MONOCYTES # BLD AUTO: 0.7 10E3/UL (ref 0–1.3)
MONOCYTES NFR BLD AUTO: 12 %
NEUTROPHILS # BLD AUTO: 3 10E3/UL (ref 1.6–8.3)
NEUTROPHILS NFR BLD AUTO: 51 %
NRBC # BLD AUTO: 0 10E3/UL
NRBC BLD AUTO-RTO: 0 /100
PLATELET # BLD AUTO: 221 10E3/UL (ref 150–450)
POTASSIUM SERPL-SCNC: 3.9 MMOL/L (ref 3.4–5.3)
PROT SERPL-MCNC: 6.6 G/DL (ref 6.4–8.3)
RBC # BLD AUTO: 4.39 10E6/UL (ref 3.8–5.2)
SODIUM SERPL-SCNC: 135 MMOL/L (ref 136–145)
WBC # BLD AUTO: 5.9 10E3/UL (ref 4–11)

## 2022-10-12 PROCEDURE — 83521 IG LIGHT CHAINS FREE EACH: CPT | Mod: 91,ZL

## 2022-10-12 PROCEDURE — 85810 BLOOD VISCOSITY EXAMINATION: CPT | Mod: ZL

## 2022-10-12 PROCEDURE — 82784 ASSAY IGA/IGD/IGG/IGM EACH: CPT | Mod: ZL

## 2022-10-12 PROCEDURE — G0008 ADMIN INFLUENZA VIRUS VAC: HCPCS

## 2022-10-12 PROCEDURE — 84155 ASSAY OF PROTEIN SERUM: CPT | Mod: ZL

## 2022-10-12 PROCEDURE — 85025 COMPLETE CBC W/AUTO DIFF WBC: CPT | Mod: ZL

## 2022-10-12 PROCEDURE — 36415 COLL VENOUS BLD VENIPUNCTURE: CPT | Mod: ZL

## 2022-10-12 PROCEDURE — 80053 COMPREHEN METABOLIC PANEL: CPT | Mod: ZL

## 2022-10-12 PROCEDURE — 82040 ASSAY OF SERUM ALBUMIN: CPT | Mod: ZL

## 2022-10-12 PROCEDURE — 86334 IMMUNOFIX E-PHORESIS SERUM: CPT | Mod: ZL | Performed by: PATHOLOGY

## 2022-10-12 PROCEDURE — 84165 PROTEIN E-PHORESIS SERUM: CPT | Mod: ZL | Performed by: PATHOLOGY

## 2022-10-12 PROCEDURE — 82232 ASSAY OF BETA-2 PROTEIN: CPT | Mod: ZL

## 2022-10-13 ENCOUNTER — TELEPHONE (OUTPATIENT)
Dept: ONCOLOGY | Facility: OTHER | Age: 72
End: 2022-10-13

## 2022-10-13 LAB — TOTAL PROTEIN SERUM FOR ELP: 6.4 G/DL (ref 6.4–8.3)

## 2022-10-13 NOTE — TELEPHONE ENCOUNTER
Please call let her know that the alkaline phosphatase is an enzyme in the body that measures activity in the liver and the bone.  If she looks back at previous ones, they are close to this level and the high normal level for this has recently decreased from 150 down to 104.  Previous measurements have been actually higher than her most recent measurement and were considered normal previously.  I have no concerns in regards to the level.

## 2022-10-13 NOTE — TELEPHONE ENCOUNTER
TC to patient to explain the below. She feels better knowing this and appreciative of the answers.    What Type Of Note Output Would You Prefer (Optional)?: Bullet Format What Is The Reason For Today's Visit?: Full Body Skin Examination What Is The Reason For Today's Visit? (Being Monitored For X): concerning skin lesions on an annual basis How Severe Are Your Spot(S)?: mild

## 2022-10-14 LAB
ALBUMIN SERPL ELPH-MCNC: 4 G/DL (ref 3.7–5.1)
ALPHA1 GLOB SERPL ELPH-MCNC: 0.3 G/DL (ref 0.2–0.4)
ALPHA2 GLOB SERPL ELPH-MCNC: 0.7 G/DL (ref 0.5–0.9)
B-GLOBULIN SERPL ELPH-MCNC: 0.6 G/DL (ref 0.6–1)
B2 MICROGLOB TUMOR MARKER SER-MCNC: 2.3 MG/L
GAMMA GLOB SERPL ELPH-MCNC: 0.7 G/DL (ref 0.7–1.6)
IGA SERPL-MCNC: 16 MG/DL (ref 84–499)
IGG SERPL-MCNC: 831 MG/DL (ref 610–1616)
IGM SERPL-MCNC: 19 MG/DL (ref 35–242)
KAPPA LC FREE SER-MCNC: 0.66 MG/DL (ref 0.33–1.94)
KAPPA LC FREE/LAMBDA FREE SER NEPH: 2.06 {RATIO} (ref 0.26–1.65)
LAMBDA LC FREE SERPL-MCNC: 0.32 MG/DL (ref 0.57–2.63)
M PROTEIN SERPL ELPH-MCNC: 0.3 G/DL
PROT PATTERN SERPL ELPH-IMP: ABNORMAL
PROT PATTERN SERPL IFE-IMP: NORMAL
VISC SER: 1.5 CP (ref 1.4–1.8)

## 2022-10-14 PROCEDURE — 84165 PROTEIN E-PHORESIS SERUM: CPT | Mod: 26

## 2022-10-14 PROCEDURE — 86334 IMMUNOFIX E-PHORESIS SERUM: CPT

## 2022-10-18 ENCOUNTER — ONCOLOGY VISIT (OUTPATIENT)
Dept: ONCOLOGY | Facility: OTHER | Age: 72
End: 2022-10-18
Attending: NURSE PRACTITIONER
Payer: COMMERCIAL

## 2022-10-18 VITALS
HEIGHT: 63 IN | RESPIRATION RATE: 18 BRPM | SYSTOLIC BLOOD PRESSURE: 122 MMHG | OXYGEN SATURATION: 97 % | HEART RATE: 82 BPM | BODY MASS INDEX: 28.44 KG/M2 | WEIGHT: 160.5 LBS | TEMPERATURE: 98 F | DIASTOLIC BLOOD PRESSURE: 70 MMHG

## 2022-10-18 DIAGNOSIS — C90.00 MULTIPLE MYELOMA NOT HAVING ACHIEVED REMISSION (H): Primary | ICD-10-CM

## 2022-10-18 LAB
ALBUMIN SERPL BCG-MCNC: 4.2 G/DL (ref 3.5–5.2)
ALP SERPL-CCNC: 141 U/L (ref 35–104)
ALT SERPL W P-5'-P-CCNC: 11 U/L (ref 10–35)
ANION GAP SERPL CALCULATED.3IONS-SCNC: 8 MMOL/L (ref 7–15)
AST SERPL W P-5'-P-CCNC: 17 U/L (ref 10–35)
BASOPHILS # BLD AUTO: 0 10E3/UL (ref 0–0.2)
BASOPHILS NFR BLD AUTO: 1 %
BILIRUB SERPL-MCNC: 0.3 MG/DL
BUN SERPL-MCNC: 16.1 MG/DL (ref 8–23)
CALCIUM SERPL-MCNC: 9.2 MG/DL (ref 8.8–10.2)
CHLORIDE SERPL-SCNC: 104 MMOL/L (ref 98–107)
CREAT SERPL-MCNC: 0.72 MG/DL (ref 0.51–0.95)
DEPRECATED HCO3 PLAS-SCNC: 24 MMOL/L (ref 22–29)
EOSINOPHIL # BLD AUTO: 0.3 10E3/UL (ref 0–0.7)
EOSINOPHIL NFR BLD AUTO: 5 %
ERYTHROCYTE [DISTWIDTH] IN BLOOD BY AUTOMATED COUNT: 13.5 % (ref 10–15)
GFR SERPL CREATININE-BSD FRML MDRD: 87 ML/MIN/1.73M2
GLUCOSE SERPL-MCNC: 92 MG/DL (ref 70–99)
HCT VFR BLD AUTO: 38.3 % (ref 35–47)
HGB BLD-MCNC: 13.4 G/DL (ref 11.7–15.7)
IMM GRANULOCYTES # BLD: 0 10E3/UL
IMM GRANULOCYTES NFR BLD: 1 %
LYMPHOCYTES # BLD AUTO: 1.5 10E3/UL (ref 0.8–5.3)
LYMPHOCYTES NFR BLD AUTO: 29 %
MCH RBC QN AUTO: 31.7 PG (ref 26.5–33)
MCHC RBC AUTO-ENTMCNC: 35 G/DL (ref 31.5–36.5)
MCV RBC AUTO: 91 FL (ref 78–100)
MONOCYTES # BLD AUTO: 0.7 10E3/UL (ref 0–1.3)
MONOCYTES NFR BLD AUTO: 12 %
NEUTROPHILS # BLD AUTO: 2.8 10E3/UL (ref 1.6–8.3)
NEUTROPHILS NFR BLD AUTO: 52 %
NRBC # BLD AUTO: 0 10E3/UL
NRBC BLD AUTO-RTO: 0 /100
PLATELET # BLD AUTO: 223 10E3/UL (ref 150–450)
POTASSIUM SERPL-SCNC: 4.2 MMOL/L (ref 3.4–5.3)
PROT SERPL-MCNC: 6.7 G/DL (ref 6.4–8.3)
RBC # BLD AUTO: 4.23 10E6/UL (ref 3.8–5.2)
SODIUM SERPL-SCNC: 136 MMOL/L (ref 136–145)
WBC # BLD AUTO: 5.3 10E3/UL (ref 4–11)

## 2022-10-18 PROCEDURE — G0463 HOSPITAL OUTPT CLINIC VISIT: HCPCS

## 2022-10-18 PROCEDURE — 82040 ASSAY OF SERUM ALBUMIN: CPT | Mod: ZL | Performed by: NURSE PRACTITIONER

## 2022-10-18 PROCEDURE — 36415 COLL VENOUS BLD VENIPUNCTURE: CPT | Mod: ZL | Performed by: NURSE PRACTITIONER

## 2022-10-18 PROCEDURE — 85014 HEMATOCRIT: CPT | Mod: ZL | Performed by: NURSE PRACTITIONER

## 2022-10-18 PROCEDURE — 99214 OFFICE O/P EST MOD 30 MIN: CPT | Performed by: NURSE PRACTITIONER

## 2022-10-18 PROCEDURE — 80053 COMPREHEN METABOLIC PANEL: CPT | Mod: ZL | Performed by: NURSE PRACTITIONER

## 2022-10-18 RX ORDER — DIPHENHYDRAMINE HCL 25 MG
25 CAPSULE ORAL DAILY
COMMUNITY

## 2022-10-18 ASSESSMENT — PAIN SCALES - GENERAL: PAINLEVEL: NO PAIN (0)

## 2022-10-18 NOTE — PATIENT INSTRUCTIONS
Thank you for allowing me to be a part of your care today.    Follow-up in 6 weeks with labs the week prior (you do NOT need to be fasting!!). We will then follow-up to review labs the week later.     If you have any questions please call [phone]    Other instructions:      None

## 2022-10-18 NOTE — NURSING NOTE
"Oncology Rooming Note    October 18, 2022 10:48 AM   Carmelita Watts is a 72 year old female who presents for:    Chief Complaint   Patient presents with     Oncology Clinic Visit     Follow up. Multiple myeloma not having achieved remission     Initial Vitals: /70   Pulse 82   Temp 98  F (36.7  C) (Tympanic)   Resp 18   Ht 1.6 m (5' 3\")   Wt 72.8 kg (160 lb 7.9 oz)   SpO2 97%   BMI 28.43 kg/m   Estimated body mass index is 28.43 kg/m  as calculated from the following:    Height as of this encounter: 1.6 m (5' 3\").    Weight as of this encounter: 72.8 kg (160 lb 7.9 oz). Body surface area is 1.8 meters squared.  No Pain (0) Comment: Data Unavailable   No LMP recorded. Patient is postmenopausal.  Allergies reviewed: Yes  Medications reviewed: Yes    Medications: Medication refills not needed today.  Pharmacy name entered into [facility]:    [id number] - [place], [place] - [address]  AT [facility] [address]  [facility] - [place], [place] - [address]  [facility] - [place] - [address]              "

## 2022-10-26 RX ORDER — ZOLEDRONIC ACID 0.04 MG/ML
4 INJECTION, SOLUTION INTRAVENOUS ONCE
Status: CANCELLED | OUTPATIENT
Start: 2022-10-26 | End: 2022-10-26

## 2022-10-31 NOTE — PROGRESS NOTES
Peripheral lab draw performed by Eve VIRAMONTES LPN. Patient tolerated well.   
Principal Discharge DX:	Allergic reaction, initial encounter
[FreeTextEntry1] : 88-year-old female with a past medical history of CAD hypertension hyperlipidemia borderline diabetes comes in for follow-up.  Overall, she is at baseline denies chest pain shortness of breath PND orthopnea.

## 2022-11-16 ENCOUNTER — OFFICE VISIT (OUTPATIENT)
Dept: FAMILY MEDICINE | Facility: OTHER | Age: 72
End: 2022-11-16
Attending: PHYSICIAN ASSISTANT
Payer: MEDICARE

## 2022-11-16 VITALS
TEMPERATURE: 97.6 F | HEART RATE: 77 BPM | WEIGHT: 161.5 LBS | OXYGEN SATURATION: 97 % | RESPIRATION RATE: 18 BRPM | BODY MASS INDEX: 28.61 KG/M2 | SYSTOLIC BLOOD PRESSURE: 132 MMHG | DIASTOLIC BLOOD PRESSURE: 90 MMHG

## 2022-11-16 DIAGNOSIS — E11.9 TYPE 2 DIABETES MELLITUS WITHOUT COMPLICATION, WITHOUT LONG-TERM CURRENT USE OF INSULIN (H): Primary | ICD-10-CM

## 2022-11-16 DIAGNOSIS — E78.5 HYPERLIPIDEMIA LDL GOAL <130: ICD-10-CM

## 2022-11-16 DIAGNOSIS — F33.0 MAJOR DEPRESSIVE DISORDER, RECURRENT EPISODE, MILD (H): ICD-10-CM

## 2022-11-16 DIAGNOSIS — M77.8 TENDINITIS OF SHOULDER, UNSPECIFIED LATERALITY: ICD-10-CM

## 2022-11-16 DIAGNOSIS — C90.00 MULTIPLE MYELOMA NOT HAVING ACHIEVED REMISSION (H): ICD-10-CM

## 2022-11-16 DIAGNOSIS — E78.2 MIXED HYPERLIPIDEMIA: ICD-10-CM

## 2022-11-16 DIAGNOSIS — I10 ESSENTIAL HYPERTENSION WITH GOAL BLOOD PRESSURE LESS THAN 140/90: ICD-10-CM

## 2022-11-16 DIAGNOSIS — Z11.59 NEED FOR HEPATITIS C SCREENING TEST: ICD-10-CM

## 2022-11-16 LAB
ALBUMIN SERPL BCG-MCNC: 4.3 G/DL (ref 3.5–5.2)
ALP SERPL-CCNC: 159 U/L (ref 35–104)
ALT SERPL W P-5'-P-CCNC: 15 U/L (ref 10–35)
ANION GAP SERPL CALCULATED.3IONS-SCNC: 9 MMOL/L (ref 7–15)
AST SERPL W P-5'-P-CCNC: 21 U/L (ref 10–35)
BASOPHILS # BLD AUTO: 0.1 10E3/UL (ref 0–0.2)
BASOPHILS NFR BLD AUTO: 1 %
BILIRUB SERPL-MCNC: 0.5 MG/DL
BUN SERPL-MCNC: 14 MG/DL (ref 8–23)
CALCIUM SERPL-MCNC: 9.7 MG/DL (ref 8.8–10.2)
CHLORIDE SERPL-SCNC: 104 MMOL/L (ref 98–107)
CREAT SERPL-MCNC: 0.78 MG/DL (ref 0.51–0.95)
DEPRECATED HCO3 PLAS-SCNC: 26 MMOL/L (ref 22–29)
EOSINOPHIL # BLD AUTO: 0.4 10E3/UL (ref 0–0.7)
EOSINOPHIL NFR BLD AUTO: 7 %
ERYTHROCYTE [DISTWIDTH] IN BLOOD BY AUTOMATED COUNT: 13.6 % (ref 10–15)
EST. AVERAGE GLUCOSE BLD GHB EST-MCNC: 126 MG/DL
GFR SERPL CREATININE-BSD FRML MDRD: 79 ML/MIN/1.73M2
GLUCOSE SERPL-MCNC: 114 MG/DL (ref 70–99)
HBA1C MFR BLD: 6 %
HCT VFR BLD AUTO: 42 % (ref 35–47)
HGB BLD-MCNC: 14.2 G/DL (ref 11.7–15.7)
IMM GRANULOCYTES # BLD: 0 10E3/UL
IMM GRANULOCYTES NFR BLD: 0 %
LYMPHOCYTES # BLD AUTO: 1.8 10E3/UL (ref 0.8–5.3)
LYMPHOCYTES NFR BLD AUTO: 35 %
MCH RBC QN AUTO: 30.6 PG (ref 26.5–33)
MCHC RBC AUTO-ENTMCNC: 33.8 G/DL (ref 31.5–36.5)
MCV RBC AUTO: 91 FL (ref 78–100)
MONOCYTES # BLD AUTO: 0.7 10E3/UL (ref 0–1.3)
MONOCYTES NFR BLD AUTO: 13 %
NEUTROPHILS # BLD AUTO: 2.3 10E3/UL (ref 1.6–8.3)
NEUTROPHILS NFR BLD AUTO: 44 %
NRBC # BLD AUTO: 0 10E3/UL
NRBC BLD AUTO-RTO: 0 /100
PLATELET # BLD AUTO: 245 10E3/UL (ref 150–450)
POTASSIUM SERPL-SCNC: 4 MMOL/L (ref 3.4–5.3)
PROT SERPL-MCNC: 7.1 G/DL (ref 6.4–8.3)
RBC # BLD AUTO: 4.64 10E6/UL (ref 3.8–5.2)
SODIUM SERPL-SCNC: 139 MMOL/L (ref 136–145)
WBC # BLD AUTO: 5.3 10E3/UL (ref 4–11)

## 2022-11-16 PROCEDURE — 84165 PROTEIN E-PHORESIS SERUM: CPT | Mod: ZL | Performed by: STUDENT IN AN ORGANIZED HEALTH CARE EDUCATION/TRAINING PROGRAM

## 2022-11-16 PROCEDURE — 99214 OFFICE O/P EST MOD 30 MIN: CPT | Performed by: PHYSICIAN ASSISTANT

## 2022-11-16 PROCEDURE — 83521 IG LIGHT CHAINS FREE EACH: CPT | Mod: 91,ZL | Performed by: PHYSICIAN ASSISTANT

## 2022-11-16 PROCEDURE — 86334 IMMUNOFIX E-PHORESIS SERUM: CPT | Mod: ZL | Performed by: STUDENT IN AN ORGANIZED HEALTH CARE EDUCATION/TRAINING PROGRAM

## 2022-11-16 PROCEDURE — 82784 ASSAY IGA/IGD/IGG/IGM EACH: CPT | Mod: ZL | Performed by: PHYSICIAN ASSISTANT

## 2022-11-16 PROCEDURE — 86803 HEPATITIS C AB TEST: CPT | Mod: ZL | Performed by: PHYSICIAN ASSISTANT

## 2022-11-16 PROCEDURE — 84155 ASSAY OF PROTEIN SERUM: CPT | Mod: ZL | Performed by: PHYSICIAN ASSISTANT

## 2022-11-16 PROCEDURE — 85810 BLOOD VISCOSITY EXAMINATION: CPT | Mod: ZL | Performed by: PHYSICIAN ASSISTANT

## 2022-11-16 PROCEDURE — 85025 COMPLETE CBC W/AUTO DIFF WBC: CPT | Mod: ZL | Performed by: PHYSICIAN ASSISTANT

## 2022-11-16 PROCEDURE — 36415 COLL VENOUS BLD VENIPUNCTURE: CPT | Mod: ZL | Performed by: PHYSICIAN ASSISTANT

## 2022-11-16 PROCEDURE — G0463 HOSPITAL OUTPT CLINIC VISIT: HCPCS | Mod: 25

## 2022-11-16 PROCEDURE — 83036 HEMOGLOBIN GLYCOSYLATED A1C: CPT | Mod: ZL | Performed by: PHYSICIAN ASSISTANT

## 2022-11-16 PROCEDURE — G0463 HOSPITAL OUTPT CLINIC VISIT: HCPCS

## 2022-11-16 PROCEDURE — 82232 ASSAY OF BETA-2 PROTEIN: CPT | Mod: ZL | Performed by: PHYSICIAN ASSISTANT

## 2022-11-16 PROCEDURE — 80053 COMPREHEN METABOLIC PANEL: CPT | Mod: ZL | Performed by: PHYSICIAN ASSISTANT

## 2022-11-16 RX ORDER — ATORVASTATIN CALCIUM 10 MG/1
10 TABLET, FILM COATED ORAL DAILY
Qty: 90 TABLET | Refills: 3 | Status: SHIPPED | OUTPATIENT
Start: 2022-11-16 | End: 2023-08-23

## 2022-11-16 RX ORDER — LOSARTAN POTASSIUM 50 MG/1
50 TABLET ORAL DAILY
Qty: 90 TABLET | Refills: 3 | Status: SHIPPED | OUTPATIENT
Start: 2022-11-16 | End: 2023-12-29

## 2022-11-16 ASSESSMENT — PATIENT HEALTH QUESTIONNAIRE - PHQ9: SUM OF ALL RESPONSES TO PHQ QUESTIONS 1-9: 2

## 2022-11-16 NOTE — PROGRESS NOTES
Assessment & Plan     Need for hepatitis C screening test.   She is going to be given hep c  Screening.   - Hepatitis C Screen Reflex to HCV RNA Quant and Genotype; Future    Type 2 diabetes mellitus without complication, without long-term current use of insulin (H)  We will be given an A1C.  Has been very stable on her medication.   - HEMOGLOBIN A1C; Future    Hyperlipidemia LDL goal <130  Due for check. Will be doing chemo labs as well.     Essential hypertension with goal blood pressure less than 140/90  She will see us back in 6 months. Good numbers today.   - losartan (COZAAR) 50 MG tablet; Take 1 tablet (50 mg) by mouth daily    Major depressive disorder, recurrent episode, mild (H)  Mood is excellent. She is going to be given refill. Numbers are stable on her cancer.  She is great mood.   - sertraline (ZOLOFT) 50 MG tablet; Take 0.5 tablets (25 mg) by mouth daily    Multiple myeloma not having achieved remission (H)  Refill.   - sertraline (ZOLOFT) 50 MG tablet; Take 0.5 tablets (25 mg) by mouth daily    Mixed hyperlipidemia  She is given a refill.   - atorvastatin (LIPITOR) 10 MG tablet; Take 1 tablet (10 mg) by mouth daily      8. Tendinitis of shoulder, unspecified laterality  Has pain in both her shoulders and her neck, is taking a new class and has her neck down a lot and this is sending pain to bother her shoulders.  Stiffness in morning.  Try PT for over use as she has started new art Classes and also doing her shoveling that doesn't help much either.  Let us know how things are going in a few weeks.   - Physical Therapy Referral; Future    Review of external notes as documented elsewhere in note  Ordering of each unique test  Prescription drug management  10 minutes spent on the date of the encounter doing chart review, history and exam, documentation and further activities per the note       BMI:   Estimated body mass index is 28.61 kg/m  as calculated from the following:    Height as of 10/18/22:  "1.6 m (5' 3\").    Weight as of this encounter: 73.3 kg (161 lb 8 oz).   Weight management plan: Discussed healthy diet and exercise guidelines    See Patient Instructions    No follow-ups on file.    VENKATESH Trevino  [facility] - [place] is a 72 year old, presenting for the following health issues:  Diabetes, Lipids, Depression, and Hypertension      HPI     Diabetes Follow-up      How often are you checking your blood sugar? Not at all    What concerns do you have today about your diabetes? None     Do you have any of these symptoms? (Select all that apply)  No numbness or tingling in feet.  No redness, sores or blisters on feet.  No complaints of excessive thirst.  No reports of blurry vision.  No significant changes to weight.    Have you had a diabetic eye exam in the last 12 months? Yes-  Location: [facility] [place]                Hyperlipidemia Follow-Up      Are you regularly taking any medication or supplement to lower your cholesterol?   Yes- Lipitor 10 mg    Are you having muscle aches or other side effects that you think could be caused by your cholesterol lowering medication?  No    Hypertension Follow-up      Do you check your blood pressure regularly outside of the clinic? No     Are you following a low salt diet? No    Are your blood pressures ever more than 140 on the top number (systolic) OR more   than 90 on the bottom number (diastolic), for example 140/90? No    BP Readings from Last 2 Encounters:   11/16/22 (!) 132/90   10/18/22 122/70     Hemoglobin A1C (%)   Date Value   05/18/2022 6.0 (H)   02/22/2022 6.3 (H)   04/27/2021 6.4 (H)   01/26/2021 6.3 (H)     LDL Cholesterol Calculated (mg/dL)   Date Value   05/18/2022 105 (H)   08/17/2021 166 (H)   04/27/2021 137 (H)   02/14/2020 125 (H)       Depression Followup    How are you doing with your depression since your last visit? No change    Are you having other symptoms that might be associated with " depression? No    Have you had a significant life event?  No     Are you feeling anxious or having panic attacks?   No    Do you have any concerns with your use of alcohol or other drugs? No    Social History     Tobacco Use     Smoking status: Never     Smokeless tobacco: Never     Tobacco comments:     Tried to Quit (YES); QUIT in 1971; Passive Exposure (NO)   Substance Use Topics     Alcohol use: Yes     Comment: RARELY     Drug use: No     PHQ 11/17/2021 2/17/2022 11/16/2022   PHQ-9 Total Score 0 0 2   Q9: Thoughts of better off dead/self-harm past 2 weeks Not at all Not at all Not at all     MINAL-7 SCORE 8/16/2021 11/17/2021 2/17/2022   Total Score 0 0 0     Last PHQ-9 11/16/2022   1.  Little interest or pleasure in doing things 0   2.  Feeling down, depressed, or hopeless 0   3.  Trouble falling or staying asleep, or sleeping too much 1   4.  Feeling tired or having little energy 1   5.  Poor appetite or overeating 0   6.  Feeling bad about yourself 0   7.  Trouble concentrating 0   8.  Moving slowly or restless 0   Q9: Thoughts of better off dead/self-harm past 2 weeks 0   PHQ-9 Total Score 2   Difficulty at work, home, or with people Not difficult at all     MINAL-7  2/17/2022   1. Feeling nervous, anxious, or on edge 0   2. Not being able to stop or control worrying 0   3. Worrying too much about different things 0   4. Trouble relaxing 0   5. Being so restless that it is hard to sit still 0   6. Becoming easily annoyed or irritable 0   7. Feeling afraid, as if something awful might happen 0   MINAL-7 Total Score 0   If you checked any problems, how difficult have they made it for you to do your work, take care of things at home, or get along with other people? -       Suicide Assessment Five-step Evaluation and Treatment (SAFE-T)          Review of Systems   Constitutional, HEENT, cardiovascular, pulmonary, gi and gu systems are negative, except as otherwise noted.      Objective    BP (!) 132/90   Pulse 77    Temp 97.6  F (36.4  C) (Tympanic)   Resp 18   Wt 73.3 kg (161 lb 8 oz)   SpO2 97%   BMI 28.61 kg/m    Body mass index is 28.61 kg/m .  Physical Exam   GENERAL: healthy, alert and no distress  EYES: Eyes grossly normal to inspection, PERRL and conjunctivae and sclerae normal  HENT: ear canals and TM's normal, nose and mouth without ulcers or lesions  NECK: no adenopathy, no asymmetry, masses, or scars and thyroid normal to palpation  RESP: lungs clear to auscultation - no rales, rhonchi or wheezes  CV: regular rate and rhythm, normal S1 S2, no S3 or S4, no murmur, click or rub, no peripheral edema and peripheral pulses strong  ABDOMEN: soft, nontender, no hepatosplenomegaly, no masses and bowel sounds normal  MS: no gross musculoskeletal defects noted, no edema, pain in both shoulders and neck. Started a new art class, stiffness not getting better , full range of motion.     Oncology Visit on 10/18/2022   Component Date Value Ref Range Status     Sodium 10/18/2022 136  136 - 145 mmol/L Final     Potassium 10/18/2022 4.2  3.4 - 5.3 mmol/L Final     Chloride 10/18/2022 104  98 - 107 mmol/L Final     Carbon Dioxide (CO2) 10/18/2022 24  22 - 29 mmol/L Final     Anion Gap 10/18/2022 8  7 - 15 mmol/L Final     Urea Nitrogen 10/18/2022 16.1  8.0 - 23.0 mg/dL Final     Creatinine 10/18/2022 0.72  0.51 - 0.95 mg/dL Final     Calcium 10/18/2022 9.2  8.8 - 10.2 mg/dL Final     Glucose 10/18/2022 92  70 - 99 mg/dL Final     Alkaline Phosphatase 10/18/2022 141 (H)  35 - 104 U/L Final     AST 10/18/2022 17  10 - 35 U/L Final     ALT 10/18/2022 11  10 - 35 U/L Final     Protein Total 10/18/2022 6.7  6.4 - 8.3 g/dL Final     Albumin 10/18/2022 4.2  3.5 - 5.2 g/dL Final     Bilirubin Total 10/18/2022 0.3  <=1.2 mg/dL Final     GFR Estimate 10/18/2022 87  >60 mL/min/1.73m2 Final    Effective December 21, 2021 eGFRcr in adults is calculated using the 2021 CKD-EPI creatinine equation which includes age and gender ( et al.,  NEJM, DOI: 10.1056/GILVri8582974)     WBC Count 10/18/2022 5.3  4.0 - 11.0 10e3/uL Final     RBC Count 10/18/2022 4.23  3.80 - 5.20 10e6/uL Final     Hemoglobin 10/18/2022 13.4  11.7 - 15.7 g/dL Final     Hematocrit 10/18/2022 38.3  35.0 - 47.0 % Final     MCV 10/18/2022 91  78 - 100 fL Final     MCH 10/18/2022 31.7  26.5 - 33.0 pg Final     MCHC 10/18/2022 35.0  31.5 - 36.5 g/dL Final     RDW 10/18/2022 13.5  10.0 - 15.0 % Final     Platelet Count 10/18/2022 223  150 - 450 10e3/uL Final     % Neutrophils 10/18/2022 52  % Final     % Lymphocytes 10/18/2022 29  % Final     % Monocytes 10/18/2022 12  % Final     % Eosinophils 10/18/2022 5  % Final     % Basophils 10/18/2022 1  % Final     % Immature Granulocytes 10/18/2022 1  % Final     NRBCs per 100 WBC 10/18/2022 0  <1 /100 Final     Absolute Neutrophils 10/18/2022 2.8  1.6 - 8.3 10e3/uL Final     Absolute Lymphocytes 10/18/2022 1.5  0.8 - 5.3 10e3/uL Final     Absolute Monocytes 10/18/2022 0.7  0.0 - 1.3 10e3/uL Final     Absolute Eosinophils 10/18/2022 0.3  0.0 - 0.7 10e3/uL Final     Absolute Basophils 10/18/2022 0.0  0.0 - 0.2 10e3/uL Final     Absolute Immature Granulocytes 10/18/2022 0.0  <=0.4 10e3/uL Final     Absolute NRBCs 10/18/2022 0.0  10e3/uL Final

## 2022-11-17 LAB
B2 MICROGLOB TUMOR MARKER SER-MCNC: 3 MG/L
HCV AB SERPL QL IA: NONREACTIVE
IGA SERPL-MCNC: 18 MG/DL (ref 84–499)
IGG SERPL-MCNC: 911 MG/DL (ref 610–1616)
IGM SERPL-MCNC: 23 MG/DL (ref 35–242)
KAPPA LC FREE SER-MCNC: 0.76 MG/DL (ref 0.33–1.94)
KAPPA LC FREE/LAMBDA FREE SER NEPH: 2.17 {RATIO} (ref 0.26–1.65)
LAMBDA LC FREE SERPL-MCNC: 0.35 MG/DL (ref 0.57–2.63)
TOTAL PROTEIN SERUM FOR ELP: 6.7 G/DL (ref 6.4–8.3)
VISC SER: 1.6 CP (ref 1.4–1.8)

## 2022-11-18 LAB
ALBUMIN SERPL ELPH-MCNC: 4.1 G/DL (ref 3.7–5.1)
ALPHA1 GLOB SERPL ELPH-MCNC: 0.3 G/DL (ref 0.2–0.4)
ALPHA2 GLOB SERPL ELPH-MCNC: 0.8 G/DL (ref 0.5–0.9)
B-GLOBULIN SERPL ELPH-MCNC: 0.7 G/DL (ref 0.6–1)
GAMMA GLOB SERPL ELPH-MCNC: 0.8 G/DL (ref 0.7–1.6)
M PROTEIN SERPL ELPH-MCNC: 0.2 G/DL
PROT PATTERN SERPL ELPH-IMP: ABNORMAL
PROT PATTERN SERPL IFE-IMP: NORMAL

## 2022-11-18 PROCEDURE — 84165 PROTEIN E-PHORESIS SERUM: CPT | Mod: 26

## 2022-11-18 PROCEDURE — 86334 IMMUNOFIX E-PHORESIS SERUM: CPT

## 2022-11-29 ENCOUNTER — ONCOLOGY VISIT (OUTPATIENT)
Dept: ONCOLOGY | Facility: OTHER | Age: 72
End: 2022-11-29
Attending: NURSE PRACTITIONER
Payer: COMMERCIAL

## 2022-11-29 VITALS
SYSTOLIC BLOOD PRESSURE: 124 MMHG | OXYGEN SATURATION: 98 % | HEIGHT: 63 IN | BODY MASS INDEX: 28.95 KG/M2 | HEART RATE: 92 BPM | TEMPERATURE: 98 F | WEIGHT: 163.36 LBS | DIASTOLIC BLOOD PRESSURE: 62 MMHG | RESPIRATION RATE: 18 BRPM

## 2022-11-29 DIAGNOSIS — R74.8 ELEVATED ALKALINE PHOSPHATASE LEVEL: ICD-10-CM

## 2022-11-29 DIAGNOSIS — M89.9 BONE LESION: ICD-10-CM

## 2022-11-29 DIAGNOSIS — C90.00 MULTIPLE MYELOMA NOT HAVING ACHIEVED REMISSION (H): Primary | ICD-10-CM

## 2022-11-29 LAB
ALBUMIN SERPL BCG-MCNC: 4.2 G/DL (ref 3.5–5.2)
ALP SERPL-CCNC: 152 U/L (ref 35–104)
ALT SERPL W P-5'-P-CCNC: 15 U/L (ref 10–35)
ANION GAP SERPL CALCULATED.3IONS-SCNC: 9 MMOL/L (ref 7–15)
AST SERPL W P-5'-P-CCNC: 20 U/L (ref 10–35)
BASOPHILS # BLD AUTO: 0.1 10E3/UL (ref 0–0.2)
BASOPHILS NFR BLD AUTO: 1 %
BILIRUB SERPL-MCNC: 0.3 MG/DL
BUN SERPL-MCNC: 24.6 MG/DL (ref 8–23)
CALCIUM SERPL-MCNC: 9.7 MG/DL (ref 8.8–10.2)
CHLORIDE SERPL-SCNC: 100 MMOL/L (ref 98–107)
CREAT SERPL-MCNC: 0.87 MG/DL (ref 0.51–0.95)
DEPRECATED HCO3 PLAS-SCNC: 24 MMOL/L (ref 22–29)
EOSINOPHIL # BLD AUTO: 0.3 10E3/UL (ref 0–0.7)
EOSINOPHIL NFR BLD AUTO: 5 %
ERYTHROCYTE [DISTWIDTH] IN BLOOD BY AUTOMATED COUNT: 13.4 % (ref 10–15)
GFR SERPL CREATININE-BSD FRML MDRD: 70 ML/MIN/1.73M2
GLUCOSE SERPL-MCNC: 92 MG/DL (ref 70–99)
HCT VFR BLD AUTO: 41.9 % (ref 35–47)
HGB BLD-MCNC: 14.4 G/DL (ref 11.7–15.7)
IMM GRANULOCYTES # BLD: 0.1 10E3/UL
IMM GRANULOCYTES NFR BLD: 1 %
LYMPHOCYTES # BLD AUTO: 2 10E3/UL (ref 0.8–5.3)
LYMPHOCYTES NFR BLD AUTO: 31 %
MCH RBC QN AUTO: 30.6 PG (ref 26.5–33)
MCHC RBC AUTO-ENTMCNC: 34.4 G/DL (ref 31.5–36.5)
MCV RBC AUTO: 89 FL (ref 78–100)
MONOCYTES # BLD AUTO: 0.8 10E3/UL (ref 0–1.3)
MONOCYTES NFR BLD AUTO: 13 %
NEUTROPHILS # BLD AUTO: 3.2 10E3/UL (ref 1.6–8.3)
NEUTROPHILS NFR BLD AUTO: 49 %
NRBC # BLD AUTO: 0 10E3/UL
NRBC BLD AUTO-RTO: 0 /100
PLATELET # BLD AUTO: 266 10E3/UL (ref 150–450)
POTASSIUM SERPL-SCNC: 4.3 MMOL/L (ref 3.4–5.3)
PROT SERPL-MCNC: 7.1 G/DL (ref 6.4–8.3)
RBC # BLD AUTO: 4.7 10E6/UL (ref 3.8–5.2)
SODIUM SERPL-SCNC: 133 MMOL/L (ref 136–145)
WBC # BLD AUTO: 6.4 10E3/UL (ref 4–11)

## 2022-11-29 PROCEDURE — 99214 OFFICE O/P EST MOD 30 MIN: CPT | Performed by: NURSE PRACTITIONER

## 2022-11-29 PROCEDURE — 85025 COMPLETE CBC W/AUTO DIFF WBC: CPT | Mod: ZL | Performed by: NURSE PRACTITIONER

## 2022-11-29 PROCEDURE — 82310 ASSAY OF CALCIUM: CPT | Mod: ZL | Performed by: NURSE PRACTITIONER

## 2022-11-29 PROCEDURE — 36415 COLL VENOUS BLD VENIPUNCTURE: CPT | Mod: ZL | Performed by: NURSE PRACTITIONER

## 2022-11-29 PROCEDURE — G0463 HOSPITAL OUTPT CLINIC VISIT: HCPCS

## 2022-11-29 ASSESSMENT — PAIN SCALES - GENERAL: PAINLEVEL: NO PAIN (0)

## 2022-11-29 NOTE — PROGRESS NOTES
Oncology Follow-up Visit    Reason for Visit:  Carmelita is a 72 year old woman with a diagnosis of multiple myeloma, who I am visiting with today for routine follow-up.     Nursing Note and documentation reviewed:Yes    Interval History:   Patient is feeling well. No concerns today. No recent signs of infection. No bleeding concerns. Eating well, weight up a couple lbs. No bowel concerns. No skin concerns. Energy levels are good. Staying active, staying busy. Feeling well.     Oncologic History  12/31/2018 Presented to the emergency room with vertigo and fatigue.  CT scan of the head was negative and subsequent stress test was negative.  5/3/2019 PCP ordered lab work and noted a total protein of 12.9.  SPEP at that time showed an M spike of 6.2 with a large monoclonal protein seen in the gamma fraction. Urine immunofixation showed a possible small protein band in the gamma fraction  5/31/2019 Evaluated by Dr. Walker with Medical Oncology with plan to rule out myeloma and obtain bone marrow aspiration biopsy as well as a metastatic bone survey along with additional labwork  6/18/2019 Underwent bone marrow aspiration and biopsy  6/24/2019 Seen again by Dr. Walker and CBC showed a hemoglobin of 9.3, M spike 7.3 with monoclonal IgG immunoglobulin of kappa light chain type; serum viscosity was 2.9; quantitative immunoglobulins showed an IgG of 8160, beta-2 microglobulin was 5.8, BUN was 21 with creatinine is 0.8 and total protein was 13.7.  Quantitative kappa/lambda free light chains showed an elevated ratio of 17.0 bone marrow aspiration biopsy showed plasma cell myeloma with approximately 80% plasma cells.  Immunofixation showed IgG kappa and flow cytometry revealed kappa monotypic plasma cells consistent with clonal plasma cell neoplasm and FISH panel was pending at that time.  It was felt she had at least stage II disease based on her beta-2 microglobulin and anemia.  Plan was to treat with Velcade 1.3 mg per/m2  days 1, 4, 8 and 11/Decadron 40 mg on days 1, 8 and 15. Initiation of Revlimid with C2 at 25 mg daily days 1 through 14.  Plan was to also obtain an MRI of the lumbar spine to rule out lytic lesion at L3.  She was initiated on Zovirax 400 mg p.o. twice daily.  6/25/2019 C1 of chemotherapy initiated  7/1/2019 Note in chart regarding patient's large co-pay for the Revlimid and no plan at this point to initiate Revlimid and treat only with Velcade and Decadron per Dr. Walker  7/11/2019 MRI lumbar spine shows a pathologic superior endplate compression fracture at L3 without evidence of retropulsed fragment and innumerable enhancing lesions throughout the lumbar spine consistent with history of multiple myeloma.  9/10/2019 Increasing M-spike and kappa lambda ratio  10/1/2019 Initiation of CyBorD  11/17/2020 Plateau of M-spike at 1.2 after 36 cycles of CyBorD  12/1/2020 Initiation of Darzalex Faspro injection  3/23/2021 M-spike increased form 1.0 to 1.2 and velcade and dexamethasone added to plan  4/12/2022 Treatment HOLIDAY commenced with Mspike 0.3 after 22 cycles of treatent  05/27/2022 Received Evvasu while on treatment holiday.      Current Chemo Regime/TX: Darzalex faspro injection 1800mg subcutaneous per protocol on Day 1 with velcade 1.3mg/m2 on days 1, 4, 8, 11 with weekly Dexamethasone, given every 21 days      **Zometa 4mg every 3 months  Current Cycle: Treatment holiday     Previous treatment:   Velcade 1.3 mg/m2 days 1, 4, 8 and 11 with Decadron 40 mg days 1, 8 and 15 x 4 cycles;   Velcade 1.5mg.m2/cyclophosphamide 150mg every 7 days on days 1,8,15 and 22/decadron 40mg days 1,8,15,22 ; Darzalex faspro injection 1800mg subcutaneous per protocol    Past Medical History:   Diagnosis Date     Arthritis      Cyst of breast      Depressive disorder      Diabetes mellitus, type 2 (H) 1/18/2021     Essential hypertension 10/1/2015     Major depressive disorder, recurrent episode, mild (H) 10/1/2015     Mixed  "hyperlipidemia 10/1/2015     Multiple myeloma not having achieved remission (H) 6/24/2019     Other specified disorders of bladder 07/09/2012    Irritable Bladder     Seasonal allergies 10/1/2015     Unspecified essential hypertension 03/19/2007     Unspecified sinusitis (chronic) 09/05/2007       Past Surgical History:   Procedure Laterality Date     APPENDECTOMY       BONE MARROW BIOPSY, BONE SPECIMEN, NEEDLE/TROCAR N/A 6/18/2019    Procedure: BONE MARROW BIOPSY;  Surgeon: Maciej Sanz MD;  Location: [facility] OR     CHOLECYSTECTOMY       COLONOSCOPY  07-    repeat 10 years     COLONOSCOPY N/A 12/30/2016    Procedure: COLONOSCOPY;  Surgeon: Bhaskar Franklin DO;  Location: [facility] OR     PUNC/ASPIR BREAST CYST Left 1995    benign     SINUS SURGERY       TUBAL STERILIZATION         Family History   Problem Relation Age of Onset     Breast Cancer Mother 60        Cause of Death     Parkinsonism Father         \"Possible\"     Coronary Artery Disease Father      Pacemaker Father      Thyroid Disease Daughter      Breast Cancer Maternal Aunt         over 50     Diabetes No family hx of      Hypertension No family hx of      Hyperlipidemia No family hx of      Cerebrovascular Disease No family hx of      Colon Cancer No family hx of      Prostate Cancer No family hx of      Genetic Disorder No family hx of      Asthma No family hx of      Anesthesia Reaction No family hx of        Social History     Socioeconomic History     Marital status:      Spouse name: Not on file     Number of children: Not on file     Years of education: Not on file     Highest education level: Not on file   Occupational History     Occupation: Financial     Comment:  - ([employer])   Tobacco Use     Smoking status: Never     Smokeless tobacco: Never     Tobacco comments:     Tried to Quit (YES); QUIT in 1971; Passive Exposure (NO)   Substance and Sexual Activity     Alcohol use: Yes     Comment: RARELY     Drug use: No     " Sexual activity: Yes     Partners: Male     Birth control/protection: None   Other Topics Concern      Service Not Asked     Blood Transfusions Not Asked     Caffeine Concern Yes     Comment: Coffee >6 cups daily     Occupational Exposure Not Asked     Hobby Hazards Not Asked     Sleep Concern Not Asked     Stress Concern Not Asked     Weight Concern Not Asked     Special Diet Not Asked     Back Care Not Asked     Exercise Not Asked     Bike Helmet Not Asked     Seat Belt Not Asked     Self-Exams Not Asked     Parent/sibling w/ CABG, MI or angioplasty before 65F 55M? No   Social History Narrative     Not on file     Social Determinants of Health     Financial Resource Strain: Not on file   Food Insecurity: Not on file   Transportation Needs: Not on file   Physical Activity: Not on file   Stress: Not on file   Social Connections: Not on file   Intimate Partner Violence: Not on file   Housing Stability: Not on file       Current Outpatient Medications   Medication     aspirin (ASA) 81 MG chewable tablet     atorvastatin (LIPITOR) 10 MG tablet     diphenhydrAMINE (BENADRYL) 25 MG capsule     EPINEPHrine (EPIPEN) 0.3 MG/0.3ML injection     fluticasone (FLONASE) 50 MCG/ACT nasal spray     L-Lysine HCl 500 MG CAPS     losartan (COZAAR) 50 MG tablet     sertraline (ZOLOFT) 50 MG tablet     No current facility-administered medications for this visit.        Allergies   Allergen Reactions     Lisinopril Cough     Phenylephrine Hcl Other (See Comments)     **Entex  HEADACHE (SEVERE)     Phenylpropanolamine Other (See Comments)     **Entex  HEADACHE (SEVERE)     Pseudoephedrine Tannate Other (See Comments)     **Entex  HEADACHE (SEVERE)     Levofloxacin Rash     **Levaquin     Moxifloxacin Hcl [Avelox] Rash       Review Of Systems:  A complete review of systems is negative except for the above mentioned items in the interval history.     Physical Exam:  /62   Pulse 92   Temp 98  F (36.7  C) (Tympanic)   Resp  "18   Ht 1.6 m (5' 3\")   Wt 74.1 kg (163 lb 5.8 oz)   SpO2 98%   BMI 28.94 kg/m    GENERAL APPEARANCE: Healthy, alert and in no acute distress.  HEENT: Eyes appear normal without scleral icterus. Extraocular movements intact.   NECK:   Supple with normal range of motion. No asymmetry or masses.  LYMPHATICS: No palpable cervical, supraclavicular, axillary, or inguinal nodes.  RESP: Lungs clear to auscultation bilaterally, respirations regular and easy.  CARDIOVASCULAR: Regular rate and rhythm. Normal S1, S2; no murmur, gallop, or rub.  ABDOMEN: Soft, non-tender, non-distended. Bowel sounds auscultated all 4 quadrants. No palpable organomegaly or masses.  MUSCULOSKELETAL: Extremities without gross deformities noted. No edema of bilateral lower extremities.  SKIN: No suspicious lesions or rashes.  NEURO: Alert and oriented x 3.  Gait steady.  PSYCHIATRIC: Mentation and affect appear normal.  Mood appropriate.      Laboratory:   Component      Latest Ref Rng & Units 11/16/2022   WBC      4.0 - 11.0 10e3/uL 5.3   RBC Count      3.80 - 5.20 10e6/uL 4.64   Hemoglobin      11.7 - 15.7 g/dL 14.2   Hematocrit      35.0 - 47.0 % 42.0   MCV      78 - 100 fL 91   MCH      26.5 - 33.0 pg 30.6   MCHC      31.5 - 36.5 g/dL 33.8   RDW      10.0 - 15.0 % 13.6   Platelet Count      150 - 450 10e3/uL 245   % Neutrophils      % 44   % Lymphocytes      % 35   % Monocytes      % 13   % Eosinophils      % 7   % Basophils      % 1   % Immature Granulocytes      % 0   NRBCs per 100 WBC      <1 /100 0   Absolute Neutrophils      1.6 - 8.3 10e3/uL 2.3   Absolute Lymphocytes      0.8 - 5.3 10e3/uL 1.8   Absolute Monocytes      0.0 - 1.3 10e3/uL 0.7   Absolute Eosinophils      0.0 - 0.7 10e3/uL 0.4   Absolute Basophils      0.0 - 0.2 10e3/uL 0.1   Absolute Immature Granulocytes      <=0.4 10e3/uL 0.0   Absolute NRBCs      10e3/uL 0.0   Sodium      136 - 145 mmol/L 139   Potassium      3.4 - 5.3 mmol/L 4.0   Chloride      98 - 107 mmol/L 104 "   Carbon Dioxide (CO2)      22 - 29 mmol/L 26   Anion Gap      7 - 15 mmol/L 9   Urea Nitrogen      8.0 - 23.0 mg/dL 14.0   Creatinine      0.51 - 0.95 mg/dL 0.78   Calcium      8.8 - 10.2 mg/dL 9.7   Glucose      70 - 99 mg/dL 114 (H)   Alkaline Phosphatase      35 - 104 U/L 159 (H)   AST      10 - 35 U/L 21   ALT      10 - 35 U/L 15   Protein Total      6.4 - 8.3 g/dL 7.1   Albumin      3.5 - 5.2 g/dL 4.3   Bilirubin Total      <=1.2 mg/dL 0.5   GFR Estimate      >60 mL/min/1.73m2 79   Albumin Fraction      3.7 - 5.1 g/dL 4.1   Alpha 1 Fraction      0.2 - 0.4 g/dL 0.3   Alpha 2 Fraction      0.5 - 0.9 g/dL 0.8   Beta Fraction      0.6 - 1.0 g/dL 0.7   Gamma Fraction      0.7 - 1.6 g/dL 0.8   Monoclonal Peak      <=0.0 g/dL 0.2 (H)   ELP Interpretation:       Small monoclonal protein (0.2 g/dL) seen in the gamma fraction. See immunofixation report on same specimen. Pathologic significance requires clinical correlation. Clair Lyons M.D., Ph.D.   IGG      610 - 1,616 mg/dL 911   IGA      84 - 499 mg/dL 18 (L)   IGM      35 - 242 mg/dL 23 (L)   [place] Free Lt Chain      0.33 - 1.94 mg/dL 0.76   Lambda Free Lt Chain      0.57 - 2.63 mg/dL 0.35 (L)   Kappa Lambda Ratio      0.26 - 1.65 2.17 (H)   Viscosity Index      1.4 - 1.8 1.6   Immunofixation ELP       Monoclonal IgG immunoglobulin of kappa light chain type. Monoclonal antibody therapeutics (e.g. Daratumumab) may appear as monoclonal proteins on serum electrophoresis and immunofixation. Results should be interpreted with caution. Pathologic significance requires clinical correlation.  Clair Lyons M.D., Ph.D.   Beta-2-Microglobulin      <2.3 mg/L 3.0 (H)   Total Protein Serum for ELP      6.4 - 8.3 g/dL 6.7       Imaging Studies:  None this visit.     ASSESSMENT/PLAN:  #1 Multiple myeloma:   IgG kappa multiple myeloma with 80% involvement of the bone marrow diagnosed June 2019 initially treated with Velcade and Decadron and received 4 cycles with increasing M-spike  and kappa/lambda ratio.  Treatment changed to CyBorD on 10/1/2019.  M-spike plateau at 1.2 and treatment changed to monotherapy with Darzalex Faspro injection. M spike declined to 1.0 then increased again to 1.2 so Velcade and Dex added to her treatment plan with cycle 5 therapy. Following C22 (April 2022), she did initiate a treatment holiday when her Mspike was 0.3. While on holiday, her labs have remained stable. Most recent mspike 0.2. IgG stable. Kappa light chains stable. We will continue holiday and see her back in about 6-7 weeks with labs 1 week prior, sooner with concerns.     #2 Lytic lesions: She remains on calcium with vitamin D twice a day. Weight bearing activity. Will resume zometa with treatment. Currently on hold.    #3 Elevated Alk Phos We have seen a slight rise in Alk Phos. Unsure etiology of that. Not too high from baseline and did come down today compared to last week so will continue to monitor. If it remains elevated, we may need to look at additional imaging.     Patient in agreement with plan and verbalizes understanding. Agrees to call with any questions or concerns.    32 minutes spent in the patient's encounter today with time spent in review of patient's chart along with chart preparation and review of the treatment plan and signing of treatment plan.  Time was also spent with the patient in obtaining a review of systems and performing a physical exam along with detailed review of all test results. Time was also spent in discussing plan for future follow-up and relating instructions for follow-up and in placing future orders.    FLO Duque, VAMSIP-C

## 2022-11-29 NOTE — PATIENT INSTRUCTIONS
Thank you for allowing me to be a part of your care today.    We will see you back in 6-7 weeks with labs 1 week prior. You do NOT need labs the day you see me.     If you have any questions please call [phone]    Other instructions:      None

## 2022-11-29 NOTE — NURSING NOTE
"Oncology Rooming Note    November 29, 2022 10:43 AM   Carmelita Watts is a 72 year old female who presents for:    Chief Complaint   Patient presents with     Oncology Clinic Visit     Follow up. Multiple myeloma not having achieved remission     Initial Vitals: /62   Pulse 92   Temp 98  F (36.7  C) (Tympanic)   Resp 18   Ht 1.6 m (5' 3\")   Wt 74.1 kg (163 lb 5.8 oz)   SpO2 98%   BMI 28.94 kg/m   Estimated body mass index is 28.94 kg/m  as calculated from the following:    Height as of this encounter: 1.6 m (5' 3\").    Weight as of this encounter: 74.1 kg (163 lb 5.8 oz). Body surface area is 1.81 meters squared.  No Pain (0) Comment: Data Unavailable   No LMP recorded. Patient is postmenopausal.  Allergies reviewed: Yes  Medications reviewed: Yes    Medications: Medication refills not needed today.  Pharmacy name entered into [facility]:    [id number] - [place], [place] - [address]  AT [facility] [place] [address]  [facility] - [place], [place] - [address]  [facility] - [place] - [address]              "

## 2022-12-09 ENCOUNTER — TELEPHONE (OUTPATIENT)
Dept: FAMILY MEDICINE | Facility: OTHER | Age: 72
End: 2022-12-09

## 2022-12-09 DIAGNOSIS — M77.8 SHOULDER TENDONITIS, UNSPECIFIED LATERALITY: Primary | ICD-10-CM

## 2022-12-09 NOTE — TELEPHONE ENCOUNTER
Doesn't look like she has been seen for any shoulder problem? Do you want her to schedule appt and when or ok to place therapy referral?

## 2022-12-09 NOTE — TELEPHONE ENCOUNTER
Mentioned in visit. I did not include this. From over use of her painting and neck being down.  Can addend her last visit.

## 2022-12-22 ENCOUNTER — HOSPITAL ENCOUNTER (OUTPATIENT)
Dept: PHYSICAL THERAPY | Facility: HOSPITAL | Age: 72
Setting detail: THERAPIES SERIES
Discharge: HOME | End: 2022-12-22
Attending: PHYSICIAN ASSISTANT
Payer: MEDICARE

## 2022-12-22 DIAGNOSIS — M77.8 TENDINITIS OF SHOULDER, UNSPECIFIED LATERALITY: ICD-10-CM

## 2022-12-22 PROCEDURE — 97161 PT EVAL LOW COMPLEX 20 MIN: CPT | Mod: GP

## 2022-12-22 PROCEDURE — 97140 MANUAL THERAPY 1/> REGIONS: CPT | Mod: GP

## 2022-12-22 PROCEDURE — 97110 THERAPEUTIC EXERCISES: CPT | Mod: GP

## 2022-12-23 NOTE — PROGRESS NOTES
"   12/23/22 1000   General Information   Type of Visit Initial OP Ortho PT Evaluation   Start of Care Date 12/22/22   Referring Physician VENKATESH Vázquez   Patient/Family Goals Statement Relieve Pain and Relieve Tingling/Numbness in hands   Orders Evaluate and Treat   Date of Order 12/22/22   Certification Required? Yes   Medical Diagnosis Tendinitis of shoulder   Surgical/Medical history reviewed Yes   Precautions/Limitations other (see comments)  (Multiple Myeloma)   Weight-Bearing Status - LUE full weight-bearing   Weight-Bearing Status - RUE full weight-bearing   Weight-Bearing Status - LLE full weight-bearing   Weight-Bearing Status - RLE full weight-bearing   General Information Comments Carmelita arrives to therapy today stating she is doing pretty well. She states she is still doing her exerices for the B LE and is happy to report that she is able to still do everything and has no pain. She notes she is here today because of her shoulders and hands. She notes she suffers with tingling/numbness in her hands. She notes it is not all the time and she is hoping that it is not carpal tunnel. She states she is agreeable to work with the PT today.       Present No   Body Part(s)   Body Part(s) Shoulder   Presentation and Etiology   Pertinent history of current problem (include personal factors and/or comorbidities that impact the POC) Patient notes she has been having numbness/tingling in B hands for the last few months and can't figure out what is causing it.   Impairments A. Pain;E. Decreased flexibility;K. Numbness   Functional Limitations perform activities of daily living;perform desired leisure / sports activities   Symptom Location B shoulders   How/Where did it occur Other  (Has had for a while now)   Onset date of current episode/exacerbation   (Per patient \"NA\")   Chronicity Chronic   Pain rating (0-10 point scale) Best (/10);Worst (/10)   Best (/10) 0   Worst (/10) 6   Pain quality " C. Aching;E. Shooting;G. Cramping   Frequency of pain/symptoms B. Intermittent   Pain/symptoms are: Other   Pain symptoms comment Intermittent   Pain/symptoms exacerbated by   (Not Answered)   Pain/symptoms eased by E. Changing positions   Progression of symptoms since onset: Worsened   Prior Level of Function   Prior Level of Function-Mobility Indep with all tasks prior to injury   Prior Level of Function-ADLs Indep with all tasks prior to injury   Current Level of Function   Patient role/employment history F. Retired   Living environment House/townhome   Current equipment-Gait/Locomotion None   Current equipment-ADL None   Fall Risk Screen   Fall screen completed by PT   Have you fallen 2 or more times in the past year? No   Have you fallen and had an injury in the past year? No   Is patient a fall risk? No   Abuse Screen (yes response referral indicated)   Feels Unsafe at Home or Work/School no   Feels Threatened by Someone no   Does Anyone Try to Keep You From Having Contact with Others or Doing Things Outside Your Home? no   Physical Signs of Abuse Present no   Patient needs abuse support services and resources No   Shoulder Objective Findings   Side (if bilateral, select both right and left) Left;Right   Cervical Screen (ROM, quadrant) WFL   Thoracic Mobility Screen WFL   Thoracic Outlet Syndrom (Torie, Marciano, Nina, Benson) Pos on R and Neg on L   Scapulothoracic Rhythm Dsyfunction noted with shoulder flexion in standing when compared B   Pec Minor (supine) Flexibility NA   Neer's Test Pos   Hylton-Brennan Test Pos   Coracoid Test Pos   Bursa Test Pos   Marinette's Test Pos   Load and Shift Test Pos   Relocation Test Neg   Sulcus Test Neg   Crossover Test Neg   Palpation Tenderness to palpation noted over the Bicep tendon insertion on the R and slight pain noted on the L; irritation with palpation in the teres minor/major on B shoulders   Right Shoulder Flexion AROM 160 deg   Right Shoulder Flexion PROM 175 deg    Left Shoulder Flexion AROM 156 deg   Left Shoulder Flexion PROM 167 deg   Right Shoulder Abduction AROM 155 deg   Right Shoulder Abduction PROM 166 deg   Left Shoulder Abduction AROM 155 deg   Left Shoulder Abduction PROM 160 deg   Right Shoulder ER AROM 45 deg at 90 deg abd   Right Shoulder ER PROM 54 deg at 90 deg abd   Left Shoulder ER AROM 54 deg at 90 deg abd   Left Shoulder ER PROM 61 deg at 90 deg abd   Right Shoulder IR AROM 33 deg at 90 deg abd   Right Shoulder IR PROM 46 deg at 90 deg abd   Left Shoulder IR AROM 39 deg at 90 deg abd   Left Shoulder IR PROM 45 deg at 90 deg abd   Right Shoulder Flexion Strength 4   Left Shoulder Flexion Strength 4   Right Shoulder Abduction Strength 4   Left Shoulder Abduction Strength 4   Right Shoulder ER Strength 4   Left Shoulder ER Strength 4   Right Shoulder IR Strength 5   Left Shoulder IR Strength 5   Right Shoulder Extension Strength 5   Left Shoulder Extension Strength 5   Right Mid Trapezius Strength NA   Left Mid Trapezius Strength NA   Right Lower Trapezius Strength NA   Left Lower Trapezius Strength NA   Planned Therapy Interventions   Planned Therapy Interventions manual therapy;neuromuscular re-education;ROM;strengthening;stretching   Planned Modality Interventions   Planned Modality Interventions Cryotherapy;Electrical stimulation;Iontophoresis;TENS;Ultrasound;Traction   Clinical Impression   Criteria for Skilled Therapeutic Interventions Met yes, treatment indicated   PT Diagnosis Bicep Tendinitis on R LE; Shoulder impingement due to shoulder dysfuntion B   Influenced by the following impairments Pain; Weakness; Decreased functional abilities due to increased tingling   Functional limitations due to impairments Difficulty with ADLs, pain, decreased functional pain-free ROM   Clinical Presentation Stable/Uncomplicated   Clinical Presentation Rationale Due to the patient's inability to complete pain-free ROM, she is requesting PT services at this time    Clinical Decision Making (Complexity) Low complexity   Therapy Frequency 1 time/week   Predicted Duration of Therapy Intervention (days/wks) 12 weeks   Risk & Benefits of therapy have been explained Yes   Patient, Family & other staff in agreement with plan of care Yes   Clinical Impression Comments Patient has Multiple Myeloma   Education Assessment   Preferred Learning Style Listening   Barriers to Learning No barriers   ORTHO GOALS   PT Ortho Eval Goals 1;2;3   Ortho Goal 1   Goal Identifier STG-1   Goal Description Patient will possess full pain-free ROM to allow her to complete her daily functional tasks.   Goal Progress New   Target Date 01/23/23   Ortho Goal 2   Goal Identifier LTG-2   Goal Description Patient will not have numbness/tingling in her hands to allow her to complete all functional tasks without irritation.   Goal Progress New   Target Date 03/27/23   Ortho Goal 3   Goal Identifier LTG-1   Goal Description Patient will possess a custom HEP to self manage care and prevent recurrence.   Goal Progress New   Target Date 03/27/23   Total Evaluation Time   PT Eval, Low Complexity Minutes (74814) 14   Therapy Certification   Certification date from 12/22/22   Certification date to 03/27/23   Medical Diagnosis Tendinitis of shoulder

## 2022-12-30 ENCOUNTER — HOSPITAL ENCOUNTER (OUTPATIENT)
Dept: PHYSICAL THERAPY | Facility: HOSPITAL | Age: 72
Setting detail: THERAPIES SERIES
Discharge: HOME | End: 2022-12-30
Attending: PHYSICIAN ASSISTANT
Payer: MEDICARE

## 2022-12-30 PROCEDURE — 97110 THERAPEUTIC EXERCISES: CPT | Mod: GP

## 2022-12-30 PROCEDURE — 97140 MANUAL THERAPY 1/> REGIONS: CPT | Mod: GP

## 2023-01-03 ENCOUNTER — LAB (OUTPATIENT)
Dept: LAB | Facility: OTHER | Age: 73
End: 2023-01-03
Payer: MEDICARE

## 2023-01-03 DIAGNOSIS — C90.00 MULTIPLE MYELOMA NOT HAVING ACHIEVED REMISSION (H): ICD-10-CM

## 2023-01-03 DIAGNOSIS — R74.8 ELEVATED ALKALINE PHOSPHATASE LEVEL: ICD-10-CM

## 2023-01-03 LAB
ALBUMIN SERPL BCG-MCNC: 4.2 G/DL (ref 3.5–5.2)
ALP SERPL-CCNC: 128 U/L (ref 35–104)
ALT SERPL W P-5'-P-CCNC: 14 U/L (ref 10–35)
ANION GAP SERPL CALCULATED.3IONS-SCNC: 15 MMOL/L (ref 7–15)
AST SERPL W P-5'-P-CCNC: 22 U/L (ref 10–35)
BASOPHILS # BLD MANUAL: 0.2 10E3/UL (ref 0–0.2)
BASOPHILS NFR BLD MANUAL: 4 %
BILIRUB SERPL-MCNC: 0.6 MG/DL
BUN SERPL-MCNC: 15.5 MG/DL (ref 8–23)
CALCIUM SERPL-MCNC: 9 MG/DL (ref 8.8–10.2)
CHLORIDE SERPL-SCNC: 100 MMOL/L (ref 98–107)
CREAT SERPL-MCNC: 0.8 MG/DL (ref 0.51–0.95)
DEPRECATED HCO3 PLAS-SCNC: 21 MMOL/L (ref 22–29)
EOSINOPHIL # BLD MANUAL: 0.3 10E3/UL (ref 0–0.7)
EOSINOPHIL NFR BLD MANUAL: 7 %
ERYTHROCYTE [DISTWIDTH] IN BLOOD BY AUTOMATED COUNT: 13.7 % (ref 10–15)
GFR SERPL CREATININE-BSD FRML MDRD: 77 ML/MIN/1.73M2
GLUCOSE SERPL-MCNC: 115 MG/DL (ref 70–99)
HCT VFR BLD AUTO: 43.6 % (ref 35–47)
HGB BLD-MCNC: 14.6 G/DL (ref 11.7–15.7)
LYMPHOCYTES # BLD MANUAL: 2.2 10E3/UL (ref 0.8–5.3)
LYMPHOCYTES NFR BLD MANUAL: 56 %
MCH RBC QN AUTO: 30 PG (ref 26.5–33)
MCHC RBC AUTO-ENTMCNC: 33.5 G/DL (ref 31.5–36.5)
MCV RBC AUTO: 90 FL (ref 78–100)
MONOCYTES # BLD MANUAL: 0.4 10E3/UL (ref 0–1.3)
MONOCYTES NFR BLD MANUAL: 9 %
NEUTROPHILS # BLD MANUAL: 1 10E3/UL (ref 1.6–8.3)
NEUTROPHILS NFR BLD MANUAL: 24 %
PLAT MORPH BLD: ABNORMAL
PLATELET # BLD AUTO: 207 10E3/UL (ref 150–450)
POTASSIUM SERPL-SCNC: 3.8 MMOL/L (ref 3.4–5.3)
PROT SERPL-MCNC: 7 G/DL (ref 6.4–8.3)
RBC # BLD AUTO: 4.87 10E6/UL (ref 3.8–5.2)
RBC MORPH BLD: ABNORMAL
SODIUM SERPL-SCNC: 136 MMOL/L (ref 136–145)
TOTAL PROTEIN SERUM FOR ELP: 6.5 G/DL (ref 6.4–8.3)
VARIANT LYMPHS BLD QL SMEAR: PRESENT
WBC # BLD AUTO: 4 10E3/UL (ref 4–11)

## 2023-01-03 PROCEDURE — 82784 ASSAY IGA/IGD/IGG/IGM EACH: CPT | Mod: ZL

## 2023-01-03 PROCEDURE — 82232 ASSAY OF BETA-2 PROTEIN: CPT | Mod: ZL

## 2023-01-03 PROCEDURE — 84155 ASSAY OF PROTEIN SERUM: CPT | Mod: ZL

## 2023-01-03 PROCEDURE — 85014 HEMATOCRIT: CPT | Mod: ZL

## 2023-01-03 PROCEDURE — 83521 IG LIGHT CHAINS FREE EACH: CPT | Mod: ZL

## 2023-01-03 PROCEDURE — 36415 COLL VENOUS BLD VENIPUNCTURE: CPT | Mod: ZL

## 2023-01-03 PROCEDURE — 85007 BL SMEAR W/DIFF WBC COUNT: CPT | Mod: ZL

## 2023-01-03 PROCEDURE — 80053 COMPREHEN METABOLIC PANEL: CPT | Mod: ZL

## 2023-01-03 PROCEDURE — 84165 PROTEIN E-PHORESIS SERUM: CPT | Mod: ZL | Performed by: PATHOLOGY

## 2023-01-03 PROCEDURE — 85810 BLOOD VISCOSITY EXAMINATION: CPT | Mod: ZL

## 2023-01-04 LAB
ALBUMIN SERPL ELPH-MCNC: 3.8 G/DL (ref 3.7–5.1)
ALPHA1 GLOB SERPL ELPH-MCNC: 0.3 G/DL (ref 0.2–0.4)
ALPHA2 GLOB SERPL ELPH-MCNC: 0.9 G/DL (ref 0.5–0.9)
B-GLOBULIN SERPL ELPH-MCNC: 0.6 G/DL (ref 0.6–1)
B2 MICROGLOB TUMOR MARKER SER-MCNC: 4.5 MG/L
GAMMA GLOB SERPL ELPH-MCNC: 0.8 G/DL (ref 0.7–1.6)
IGA SERPL-MCNC: 22 MG/DL (ref 84–499)
IGG SERPL-MCNC: 995 MG/DL (ref 610–1616)
IGM SERPL-MCNC: 39 MG/DL (ref 35–242)
KAPPA LC FREE SER-MCNC: 1.12 MG/DL (ref 0.33–1.94)
KAPPA LC FREE/LAMBDA FREE SER NEPH: 1.75 {RATIO} (ref 0.26–1.65)
LAMBDA LC FREE SERPL-MCNC: 0.64 MG/DL (ref 0.57–2.63)
M PROTEIN SERPL ELPH-MCNC: 0.3 G/DL
PROT PATTERN SERPL ELPH-IMP: ABNORMAL
VISC SER: 1.5 CP (ref 1.4–1.8)

## 2023-01-04 PROCEDURE — 84165 PROTEIN E-PHORESIS SERUM: CPT | Mod: 26

## 2023-01-04 NOTE — PROGRESS NOTES
Oncology Follow-up Visit    Reason for Visit:  Carmelita is a 72 year old woman with a diagnosis of multiple myeloma, who I am visiting with today for routine follow-up.     Nursing Note and documentation reviewed:Yes    Interval History:   Doing well. Notes she has the tail end of a recent cold that she developed after [place]. She got from her grandson. No fevers. Slight residual cough. No chest pain or SOB.     Has otherwise felt well. No bleeding concerns. Eating well. No bowel concerns. Energy levels good. No skin concerns. Has been working with PT for a right shoulder tendonitis and this has been helpful.     Overall, doing well.     Oncologic History  12/31/2018 Presented to the emergency room with vertigo and fatigue.  CT scan of the head was negative and subsequent stress test was negative.  5/3/2019 PCP ordered lab work and noted a total protein of 12.9.  SPEP at that time showed an M spike of 6.2 with a large monoclonal protein seen in the gamma fraction. Urine immunofixation showed a possible small protein band in the gamma fraction  5/31/2019 Evaluated by Dr. Walker with Medical Oncology with plan to rule out myeloma and obtain bone marrow aspiration biopsy as well as a metastatic bone survey along with additional labwork  6/18/2019 Underwent bone marrow aspiration and biopsy  6/24/2019 Seen again by Dr. Walker and CBC showed a hemoglobin of 9.3, M spike 7.3 with monoclonal IgG immunoglobulin of kappa light chain type; serum viscosity was 2.9; quantitative immunoglobulins showed an IgG of 8160, beta-2 microglobulin was 5.8, BUN was 21 with creatinine is 0.8 and total protein was 13.7.  Quantitative kappa/lambda free light chains showed an elevated ratio of 17.0 bone marrow aspiration biopsy showed plasma cell myeloma with approximately 80% plasma cells.  Immunofixation showed IgG kappa and flow cytometry revealed kappa monotypic plasma cells consistent with clonal plasma cell neoplasm and FISH panel  was pending at that time.  It was felt she had at least stage II disease based on her beta-2 microglobulin and anemia.  Plan was to treat with Velcade 1.3 mg per/m2 days 1, 4, 8 and 11/Decadron 40 mg on days 1, 8 and 15. Initiation of Revlimid with C2 at 25 mg daily days 1 through 14.  Plan was to also obtain an MRI of the lumbar spine to rule out lytic lesion at L3.  She was initiated on Zovirax 400 mg p.o. twice daily.  6/25/2019 C1 of chemotherapy initiated  7/1/2019 Note in chart regarding patient's large co-pay for the Revlimid and no plan at this point to initiate Revlimid and treat only with Velcade and Decadron per Dr. Walker  7/11/2019 MRI lumbar spine shows a pathologic superior endplate compression fracture at L3 without evidence of retropulsed fragment and innumerable enhancing lesions throughout the lumbar spine consistent with history of multiple myeloma.  9/10/2019 Increasing M-spike and kappa lambda ratio  10/1/2019 Initiation of CyBorD  11/17/2020 Plateau of M-spike at 1.2 after 36 cycles of CyBorD  12/1/2020 Initiation of Darzalex Faspro injection  3/23/2021 M-spike increased form 1.0 to 1.2 and velcade and dexamethasone added to plan  4/12/2022 Treatment HOLIDAY commenced with Mspike 0.3 after 22 cycles of treatent  05/27/2022 Received Evusheld while on treatment holiday.      Current Chemo Regime/TX: Darzalex faspro injection 1800mg subcutaneous per protocol on Day 1 with velcade 1.3mg/m2 on days 1, 4, 8, 11 with weekly Dexamethasone, given every 21 days      **Zometa 4mg every 3 months    Current Cycle: Treatment holiday     Previous treatment:   Velcade 1.3 mg/m2 days 1, 4, 8 and 11 with Decadron 40 mg days 1, 8 and 15 x 4 cycles;   Velcade 1.5mg.m2/cyclophosphamide 150mg every 7 days on days 1,8,15 and 22/decadron 40mg days 1,8,15,22 ; Darzalex faspro injection 1800mg subcutaneous per protocol    Past Medical History:   Diagnosis Date     Arthritis      Cyst of breast      Depressive  "disorder      Diabetes mellitus, type 2 (H) 1/18/2021     Essential hypertension 10/1/2015     Major depressive disorder, recurrent episode, mild (H) 10/1/2015     Mixed hyperlipidemia 10/1/2015     Multiple myeloma not having achieved remission (H) 6/24/2019     Other specified disorders of bladder 07/09/2012    Irritable Bladder     Seasonal allergies 10/1/2015     Unspecified essential hypertension 03/19/2007     Unspecified sinusitis (chronic) 09/05/2007       Past Surgical History:   Procedure Laterality Date     APPENDECTOMY       BONE MARROW BIOPSY, BONE SPECIMEN, NEEDLE/TROCAR N/A 6/18/2019    Procedure: BONE MARROW BIOPSY;  Surgeon: Maciej Sanz MD;  Location: [facility] OR     CHOLECYSTECTOMY       COLONOSCOPY  07-    repeat 10 years     COLONOSCOPY N/A 12/30/2016    Procedure: COLONOSCOPY;  Surgeon: Bhaskar Franklin DO;  Location: [facility] OR     PUNC/ASPIR BREAST CYST Left 1995    benign     SINUS SURGERY       TUBAL STERILIZATION         Family History   Problem Relation Age of Onset     Breast Cancer Mother 60        Cause of Death     Parkinsonism Father         \"Possible\"     Coronary Artery Disease Father      Pacemaker Father      Thyroid Disease Daughter      Breast Cancer Maternal Aunt         over 50     Diabetes No family hx of      Hypertension No family hx of      Hyperlipidemia No family hx of      Cerebrovascular Disease No family hx of      Colon Cancer No family hx of      Prostate Cancer No family hx of      Genetic Disorder No family hx of      Asthma No family hx of      Anesthesia Reaction No family hx of        Social History     Socioeconomic History     Marital status:      Spouse name: Not on file     Number of children: Not on file     Years of education: Not on file     Highest education level: Not on file   Occupational History     Occupation: Financial     Comment:  - (FT)   Tobacco Use     Smoking status: Never     Smokeless tobacco: Never     Tobacco " comments:     Tried to Quit (YES); QUIT in 1971; Passive Exposure (NO)   Substance and Sexual Activity     Alcohol use: Yes     Comment: RARELY     Drug use: No     Sexual activity: Yes     Partners: Male     Birth control/protection: None   Other Topics Concern      Service Not Asked     Blood Transfusions Not Asked     Caffeine Concern Yes     Comment: Coffee >6 cups daily     Occupational Exposure Not Asked     Hobby Hazards Not Asked     Sleep Concern Not Asked     Stress Concern Not Asked     Weight Concern Not Asked     Special Diet Not Asked     Back Care Not Asked     Exercise Not Asked     Bike Helmet Not Asked     Seat Belt Not Asked     Self-Exams Not Asked     Parent/sibling w/ CABG, MI or angioplasty before 65F 55M? No   Social History Narrative     Not on file     Social Determinants of Health     Financial Resource Strain: Not on file   Food Insecurity: Not on file   Transportation Needs: Not on file   Physical Activity: Not on file   Stress: Not on file   Social Connections: Not on file   Intimate Partner Violence: Not on file   Housing Stability: Not on file       Current Outpatient Medications   Medication     aspirin (ASA) 81 MG chewable tablet     atorvastatin (LIPITOR) 10 MG tablet     diphenhydrAMINE (BENADRYL) 25 MG capsule     fluticasone (FLONASE) 50 MCG/ACT nasal spray     L-Lysine HCl 500 MG CAPS     losartan (COZAAR) 50 MG tablet     sertraline (ZOLOFT) 50 MG tablet     EPINEPHrine (EPIPEN) 0.3 MG/0.3ML injection     No current facility-administered medications for this visit.        Allergies   Allergen Reactions     Lisinopril Cough     Phenylephrine Hcl Other (See Comments)     **Entex  HEADACHE (SEVERE)     Phenylpropanolamine Other (See Comments)     **Entex  HEADACHE (SEVERE)     Pseudoephedrine Tannate Other (See Comments)     **Entex  HEADACHE (SEVERE)     Levofloxacin Rash     **Levaquin     Moxifloxacin Hcl [Avelox] Rash       Review Of Systems:  A complete review of  "systems is negative except for the above mentioned items in the interval history.     Physical Exam:  /74   Pulse 85   Temp 97.7  F (36.5  C) (Tympanic)   Resp 17   Ht 1.6 m (5' 3\")   Wt 75.2 kg (165 lb 12.6 oz)   SpO2 98%   BMI 29.37 kg/m    GENERAL APPEARANCE: Healthy, alert and in no acute distress.  HEENT: Eyes appear normal without scleral icterus. Extraocular movements intact.   NECK:   Supple with normal range of motion. No asymmetry or masses.  LYMPHATICS: No palpable cervical, supraclavicular nodes.  RESP: Lungs clear to auscultation bilaterally, respirations regular and easy.  CARDIOVASCULAR: Regular rate and rhythm. Normal S1, S2; no murmur, gallop, or rub.  ABDOMEN: Soft, non-tender, non-distended. Bowel sounds auscultated all 4 quadrants. No palpable organomegaly or masses.  MUSCULOSKELETAL: Extremities without gross deformities noted. No edema of bilateral lower extremities.  SKIN: No suspicious lesions or rashes.  NEURO: Alert and oriented x 3.  Gait steady.  PSYCHIATRIC: Mentation and affect appear normal.  Mood appropriate.      Laboratory:   Component      Latest Ref Rng & Units 1/3/2023   Sodium      136 - 145 mmol/L 136   Potassium      3.4 - 5.3 mmol/L 3.8   Chloride      98 - 107 mmol/L 100   Carbon Dioxide (CO2)      22 - 29 mmol/L 21 (L)   Anion Gap      7 - 15 mmol/L 15   Urea Nitrogen      8.0 - 23.0 mg/dL 15.5   Creatinine      0.51 - 0.95 mg/dL 0.80   Calcium      8.8 - 10.2 mg/dL 9.0   Glucose      70 - 99 mg/dL 115 (H)   Alkaline Phosphatase      35 - 104 U/L 128 (H)   AST      10 - 35 U/L 22   ALT      10 - 35 U/L 14   Protein Total      6.4 - 8.3 g/dL 7.0   Albumin      3.5 - 5.2 g/dL 4.2   Bilirubin Total      <=1.2 mg/dL 0.6   GFR Estimate      >60 mL/min/1.73m2 77   % Neutrophils      % 24   % Lymphocytes      % 56   % Monocytes      % 9   % Eosinophils      % 7   % Basophils      % 4   Absolute Neutrophil      1.6 - 8.3 10e3/uL 1.0 (L)   Absolute Lymphocytes      0.8 " - 5.3 10e3/uL 2.2   Absolute Monocytes      0.0 - 1.3 10e3/uL 0.4   Absolute Eosinophils      0.0 - 0.7 10e3/uL 0.3   Absolute Basophils      0.0 - 0.2 10e3/uL 0.2   RBC Morphology       Confirmed RBC Indices   Platelet Morphology      Automated Count Confirmed. Platelet morphology is normal. Automated Count Confirmed. Platelet morphology is normal.   Reactive Lymphs      None Seen Present (A)   WBC      4.0 - 11.0 10e3/uL 4.0   RBC Count      3.80 - 5.20 10e6/uL 4.87   Hemoglobin      11.7 - 15.7 g/dL 14.6   Hematocrit      35.0 - 47.0 % 43.6   MCV      78 - 100 fL 90   MCH      26.5 - 33.0 pg 30.0   MCHC      31.5 - 36.5 g/dL 33.5   RDW      10.0 - 15.0 % 13.7   Platelet Count      150 - 450 10e3/uL 207   Albumin Fraction      3.7 - 5.1 g/dL 3.8   Alpha 1 Fraction      0.2 - 0.4 g/dL 0.3   Alpha 2 Fraction      0.5 - 0.9 g/dL 0.9   Beta Fraction      0.6 - 1.0 g/dL 0.6   Gamma Fraction      0.7 - 1.6 g/dL 0.8   Monoclonal Peak      <=0.0 g/dL 0.3 (H)   ELP Interpretation:       Small monoclonal protein (about 0.3 g/dL) seen in the gamma fraction. Previously characterized in our laboratory on 11/16/2022 as a monoclonal IgG immunoglobulin of kappa light chain type. Pathologic significance requires clinical correlation. BIA Izaguirre M.D., Ph.D., Pathologist ().   IGG      610 - 1,616 mg/dL 995   IGA      84 - 499 mg/dL 22 (L)   IGM      35 - 242 mg/dL 39   [place] Free Lt Chain      0.33 - 1.94 mg/dL 1.12   Lambda Free Lt Chain      0.57 - 2.63 mg/dL 0.64   Kappa Lambda Ratio      0.26 - 1.65 1.75 (H)   Viscosity Index      1.4 - 1.8 1.5   Beta-2-Microglobulin      <2.3 mg/L 4.5 (H)   Total Protein Serum for ELP      6.4 - 8.3 g/dL 6.5       Imaging Studies:  None this visit.     ASSESSMENT/PLAN:  #1 Multiple myeloma:   IgG kappa multiple myeloma with 80% involvement of the bone marrow diagnosed June 2019 initially treated with Velcade and Decadron and received 4 cycles with increasing M-spike and  kappa/lambda ratio.  Treatment changed to CyBorD on 10/1/2019.  M-spike plateau at 1.2 and treatment changed to monotherapy with Darzalex Faspro injection. M spike declined to 1.0 then increased again to 1.2 so Velcade and Dex added to her treatment plan with cycle 5 therapy. Following C22 (April 2022), she did initiate a treatment holiday when her Mspike was 0.3. While on holiday, her labs have remained stable. Slight rise in [place] light chains, slight rise in mspike to 0.3. Overall stable. No evidence of end organ damage. A new neutropenia was noted on labs obtained last week. Will redraw CBC today and ensure this is recovering. I will see her back in 6 weeks, sooner with concerns.     #2 Lytic lesions: She remains on calcium with vitamin D twice a day. Weight bearing activity. Will resume zometa with treatment. Currently on hold.    #3 URI Improving. If symptoms worsen or fever develops, she should follow-up with PCP.     Patient in agreement with plan and verbalizes understanding. Agrees to call with any questions or concerns.    41 minutes spent in the patient's encounter today with time spent in review of patient's chart along with chart preparation and review of the treatment plan and signing of treatment plan.  Time was also spent with the patient in obtaining a review of systems and performing a physical exam along with detailed review of all test results. Time was also spent in discussing plan for future follow-up and relating instructions for follow-up and in placing future orders.    FLO Duque, VAMSIP-C

## 2023-01-05 ENCOUNTER — HOSPITAL ENCOUNTER (OUTPATIENT)
Dept: PHYSICAL THERAPY | Facility: HOSPITAL | Age: 73
Setting detail: THERAPIES SERIES
Discharge: HOME | End: 2023-01-05
Attending: PHYSICIAN ASSISTANT
Payer: MEDICARE

## 2023-01-05 PROCEDURE — 97110 THERAPEUTIC EXERCISES: CPT | Mod: GP

## 2023-01-05 PROCEDURE — 97140 MANUAL THERAPY 1/> REGIONS: CPT | Mod: GP

## 2023-01-10 ENCOUNTER — ONCOLOGY VISIT (OUTPATIENT)
Dept: ONCOLOGY | Facility: OTHER | Age: 73
End: 2023-01-10
Attending: NURSE PRACTITIONER
Payer: COMMERCIAL

## 2023-01-10 VITALS
TEMPERATURE: 97.7 F | HEIGHT: 63 IN | WEIGHT: 165.79 LBS | OXYGEN SATURATION: 98 % | SYSTOLIC BLOOD PRESSURE: 122 MMHG | RESPIRATION RATE: 17 BRPM | BODY MASS INDEX: 29.38 KG/M2 | DIASTOLIC BLOOD PRESSURE: 74 MMHG | HEART RATE: 85 BPM

## 2023-01-10 DIAGNOSIS — C90.00 MULTIPLE MYELOMA NOT HAVING ACHIEVED REMISSION (H): Primary | ICD-10-CM

## 2023-01-10 LAB
BASOPHILS # BLD AUTO: 0 10E3/UL (ref 0–0.2)
BASOPHILS NFR BLD AUTO: 1 %
EOSINOPHIL # BLD AUTO: 0.3 10E3/UL (ref 0–0.7)
EOSINOPHIL NFR BLD AUTO: 4 %
ERYTHROCYTE [DISTWIDTH] IN BLOOD BY AUTOMATED COUNT: 13.2 % (ref 10–15)
HCT VFR BLD AUTO: 40.6 % (ref 35–47)
HGB BLD-MCNC: 13.8 G/DL (ref 11.7–15.7)
IMM GRANULOCYTES # BLD: 0.1 10E3/UL
IMM GRANULOCYTES NFR BLD: 1 %
LYMPHOCYTES # BLD AUTO: 1.9 10E3/UL (ref 0.8–5.3)
LYMPHOCYTES NFR BLD AUTO: 33 %
MCH RBC QN AUTO: 30.1 PG (ref 26.5–33)
MCHC RBC AUTO-ENTMCNC: 34 G/DL (ref 31.5–36.5)
MCV RBC AUTO: 89 FL (ref 78–100)
MONOCYTES # BLD AUTO: 0.6 10E3/UL (ref 0–1.3)
MONOCYTES NFR BLD AUTO: 11 %
NEUTROPHILS # BLD AUTO: 2.9 10E3/UL (ref 1.6–8.3)
NEUTROPHILS NFR BLD AUTO: 50 %
NRBC # BLD AUTO: 0 10E3/UL
NRBC BLD AUTO-RTO: 0 /100
PLATELET # BLD AUTO: 244 10E3/UL (ref 150–450)
RBC # BLD AUTO: 4.59 10E6/UL (ref 3.8–5.2)
WBC # BLD AUTO: 5.9 10E3/UL (ref 4–11)

## 2023-01-10 PROCEDURE — 36415 COLL VENOUS BLD VENIPUNCTURE: CPT | Mod: ZL | Performed by: NURSE PRACTITIONER

## 2023-01-10 PROCEDURE — 85004 AUTOMATED DIFF WBC COUNT: CPT | Mod: ZL | Performed by: NURSE PRACTITIONER

## 2023-01-10 PROCEDURE — G0463 HOSPITAL OUTPT CLINIC VISIT: HCPCS

## 2023-01-10 PROCEDURE — 99215 OFFICE O/P EST HI 40 MIN: CPT | Performed by: NURSE PRACTITIONER

## 2023-01-10 ASSESSMENT — PAIN SCALES - GENERAL: PAINLEVEL: NO PAIN (0)

## 2023-01-10 NOTE — PATIENT INSTRUCTIONS
Thank you for allowing me to be a part of your care today.    I will see you back in 6 weeks with labs one week prior, sooner with concerns.     If you have any questions please call [phone]    Other instructions:      Will call with results of you blood count today.

## 2023-01-10 NOTE — NURSING NOTE
"Oncology Rooming Note    January 10, 2023 10:34 AM   Carmelita Watts is a 72 year old female who presents for:    Chief Complaint   Patient presents with     Oncology Clinic Visit     Follow up. Multiple myeloma not having achieved remission      Initial Vitals: /74   Pulse 85   Temp 97.7  F (36.5  C) (Tympanic)   Resp 17   Ht 1.6 m (5' 3\")   Wt 75.2 kg (165 lb 12.6 oz)   SpO2 98%   BMI 29.37 kg/m   Estimated body mass index is 29.37 kg/m  as calculated from the following:    Height as of this encounter: 1.6 m (5' 3\").    Weight as of this encounter: 75.2 kg (165 lb 12.6 oz). Body surface area is 1.83 meters squared.  No Pain (0) Comment: Data Unavailable   No LMP recorded. Patient is postmenopausal.  Allergies reviewed: Yes  Medications reviewed: Yes    Medications: Medication refills not needed today.  Pharmacy name entered into [facility]:    [id number] - [place] - [address] AT [facility] [address]  [facility] - [place], [place] - [address]  [facility] - [place] - [address]    Clinical concerns: none       Julia Andrade LPN              "

## 2023-01-11 ENCOUNTER — HOSPITAL ENCOUNTER (OUTPATIENT)
Dept: PHYSICAL THERAPY | Facility: HOSPITAL | Age: 73
Setting detail: THERAPIES SERIES
Discharge: HOME | End: 2023-01-11
Attending: PHYSICIAN ASSISTANT
Payer: MEDICARE

## 2023-01-11 PROCEDURE — 97140 MANUAL THERAPY 1/> REGIONS: CPT | Mod: GP

## 2023-01-11 PROCEDURE — 97110 THERAPEUTIC EXERCISES: CPT | Mod: GP

## 2023-01-15 ENCOUNTER — HEALTH MAINTENANCE LETTER (OUTPATIENT)
Age: 73
End: 2023-01-15

## 2023-01-18 ENCOUNTER — HOSPITAL ENCOUNTER (OUTPATIENT)
Dept: PHYSICAL THERAPY | Facility: HOSPITAL | Age: 73
Setting detail: THERAPIES SERIES
Discharge: HOME | End: 2023-01-18
Attending: PHYSICIAN ASSISTANT
Payer: MEDICARE

## 2023-01-18 PROCEDURE — 97140 MANUAL THERAPY 1/> REGIONS: CPT | Mod: GP

## 2023-01-18 PROCEDURE — 97110 THERAPEUTIC EXERCISES: CPT | Mod: GP

## 2023-01-25 ENCOUNTER — HOSPITAL ENCOUNTER (OUTPATIENT)
Dept: PHYSICAL THERAPY | Facility: HOSPITAL | Age: 73
Setting detail: THERAPIES SERIES
Discharge: HOME | End: 2023-01-25
Attending: PHYSICIAN ASSISTANT
Payer: MEDICARE

## 2023-01-25 PROCEDURE — 97140 MANUAL THERAPY 1/> REGIONS: CPT | Mod: GP

## 2023-01-25 PROCEDURE — 97110 THERAPEUTIC EXERCISES: CPT | Mod: GP

## 2023-02-01 ENCOUNTER — HOSPITAL ENCOUNTER (OUTPATIENT)
Dept: PHYSICAL THERAPY | Facility: HOSPITAL | Age: 73
Setting detail: THERAPIES SERIES
Discharge: HOME | End: 2023-02-01
Attending: PHYSICIAN ASSISTANT
Payer: MEDICARE

## 2023-02-01 PROCEDURE — 97140 MANUAL THERAPY 1/> REGIONS: CPT | Mod: GP

## 2023-02-01 PROCEDURE — 97110 THERAPEUTIC EXERCISES: CPT | Mod: GP

## 2023-02-08 ENCOUNTER — HOSPITAL ENCOUNTER (OUTPATIENT)
Dept: PHYSICAL THERAPY | Facility: HOSPITAL | Age: 73
Setting detail: THERAPIES SERIES
Discharge: HOME | End: 2023-02-08
Attending: PHYSICIAN ASSISTANT
Payer: MEDICARE

## 2023-02-08 PROCEDURE — 97140 MANUAL THERAPY 1/> REGIONS: CPT | Mod: GP

## 2023-02-08 PROCEDURE — 97110 THERAPEUTIC EXERCISES: CPT | Mod: GP

## 2023-02-14 ENCOUNTER — LAB (OUTPATIENT)
Dept: LAB | Facility: OTHER | Age: 73
End: 2023-02-14
Payer: MEDICARE

## 2023-02-14 DIAGNOSIS — C90.00 MULTIPLE MYELOMA NOT HAVING ACHIEVED REMISSION (H): ICD-10-CM

## 2023-02-14 LAB
ALBUMIN SERPL BCG-MCNC: 4.2 G/DL (ref 3.5–5.2)
ALP SERPL-CCNC: 155 U/L (ref 35–104)
ALT SERPL W P-5'-P-CCNC: 15 U/L (ref 10–35)
ANION GAP SERPL CALCULATED.3IONS-SCNC: 8 MMOL/L (ref 7–15)
AST SERPL W P-5'-P-CCNC: 19 U/L (ref 10–35)
BASOPHILS # BLD AUTO: 0 10E3/UL (ref 0–0.2)
BASOPHILS NFR BLD AUTO: 1 %
BILIRUB SERPL-MCNC: 0.3 MG/DL
BUN SERPL-MCNC: 23.4 MG/DL (ref 8–23)
CALCIUM SERPL-MCNC: 9.5 MG/DL (ref 8.8–10.2)
CHLORIDE SERPL-SCNC: 102 MMOL/L (ref 98–107)
CREAT SERPL-MCNC: 0.73 MG/DL (ref 0.51–0.95)
DEPRECATED HCO3 PLAS-SCNC: 27 MMOL/L (ref 22–29)
EOSINOPHIL # BLD AUTO: 0.2 10E3/UL (ref 0–0.7)
EOSINOPHIL NFR BLD AUTO: 3 %
ERYTHROCYTE [DISTWIDTH] IN BLOOD BY AUTOMATED COUNT: 13.8 % (ref 10–15)
GFR SERPL CREATININE-BSD FRML MDRD: 86 ML/MIN/1.73M2
GLUCOSE SERPL-MCNC: 108 MG/DL (ref 70–99)
HCT VFR BLD AUTO: 41.8 % (ref 35–47)
HGB BLD-MCNC: 14.1 G/DL (ref 11.7–15.7)
IMM GRANULOCYTES # BLD: 0 10E3/UL
IMM GRANULOCYTES NFR BLD: 1 %
LYMPHOCYTES # BLD AUTO: 2 10E3/UL (ref 0.8–5.3)
LYMPHOCYTES NFR BLD AUTO: 36 %
MCH RBC QN AUTO: 30 PG (ref 26.5–33)
MCHC RBC AUTO-ENTMCNC: 33.7 G/DL (ref 31.5–36.5)
MCV RBC AUTO: 89 FL (ref 78–100)
MONOCYTES # BLD AUTO: 0.6 10E3/UL (ref 0–1.3)
MONOCYTES NFR BLD AUTO: 10 %
NEUTROPHILS # BLD AUTO: 2.7 10E3/UL (ref 1.6–8.3)
NEUTROPHILS NFR BLD AUTO: 49 %
NRBC # BLD AUTO: 0 10E3/UL
NRBC BLD AUTO-RTO: 0 /100
PLATELET # BLD AUTO: 251 10E3/UL (ref 150–450)
POTASSIUM SERPL-SCNC: 4.3 MMOL/L (ref 3.4–5.3)
PROT SERPL-MCNC: 7 G/DL (ref 6.4–8.3)
RBC # BLD AUTO: 4.7 10E6/UL (ref 3.8–5.2)
SODIUM SERPL-SCNC: 137 MMOL/L (ref 136–145)
TOTAL PROTEIN SERUM FOR ELP: 6.6 G/DL (ref 6.4–8.3)
WBC # BLD AUTO: 5.6 10E3/UL (ref 4–11)

## 2023-02-14 PROCEDURE — 82784 ASSAY IGA/IGD/IGG/IGM EACH: CPT | Mod: ZL

## 2023-02-14 PROCEDURE — 36415 COLL VENOUS BLD VENIPUNCTURE: CPT | Mod: ZL

## 2023-02-14 PROCEDURE — 84075 ASSAY ALKALINE PHOSPHATASE: CPT | Mod: ZL,XU | Performed by: NURSE PRACTITIONER

## 2023-02-14 PROCEDURE — 84165 PROTEIN E-PHORESIS SERUM: CPT | Mod: ZL | Performed by: PATHOLOGY

## 2023-02-14 PROCEDURE — 80053 COMPREHEN METABOLIC PANEL: CPT | Mod: ZL

## 2023-02-14 PROCEDURE — 85025 COMPLETE CBC W/AUTO DIFF WBC: CPT | Mod: ZL

## 2023-02-14 PROCEDURE — 83521 IG LIGHT CHAINS FREE EACH: CPT | Mod: ZL

## 2023-02-14 PROCEDURE — 82232 ASSAY OF BETA-2 PROTEIN: CPT | Mod: ZL

## 2023-02-14 PROCEDURE — 84165 PROTEIN E-PHORESIS SERUM: CPT | Mod: 26

## 2023-02-14 PROCEDURE — 84155 ASSAY OF PROTEIN SERUM: CPT | Mod: ZL

## 2023-02-14 PROCEDURE — 85810 BLOOD VISCOSITY EXAMINATION: CPT | Mod: ZL

## 2023-02-15 ENCOUNTER — MYC MEDICAL ADVICE (OUTPATIENT)
Dept: ONCOLOGY | Facility: OTHER | Age: 73
End: 2023-02-15
Payer: COMMERCIAL

## 2023-02-15 ENCOUNTER — HOSPITAL ENCOUNTER (OUTPATIENT)
Dept: PHYSICAL THERAPY | Facility: HOSPITAL | Age: 73
Setting detail: THERAPIES SERIES
Discharge: HOME | End: 2023-02-15
Attending: PHYSICIAN ASSISTANT
Payer: MEDICARE

## 2023-02-15 DIAGNOSIS — R74.8 ELEVATED ALKALINE PHOSPHATASE LEVEL: Primary | ICD-10-CM

## 2023-02-15 DIAGNOSIS — C90.00 MULTIPLE MYELOMA NOT HAVING ACHIEVED REMISSION (H): Primary | ICD-10-CM

## 2023-02-15 DIAGNOSIS — C90.01 MULTIPLE MYELOMA IN REMISSION (H): ICD-10-CM

## 2023-02-15 DIAGNOSIS — R74.8 ELEVATED ALKALINE PHOSPHATASE LEVEL: ICD-10-CM

## 2023-02-15 LAB
ALBUMIN SERPL ELPH-MCNC: 4 G/DL (ref 3.7–5.1)
ALPHA1 GLOB SERPL ELPH-MCNC: 0.3 G/DL (ref 0.2–0.4)
ALPHA2 GLOB SERPL ELPH-MCNC: 0.7 G/DL (ref 0.5–0.9)
B-GLOBULIN SERPL ELPH-MCNC: 0.6 G/DL (ref 0.6–1)
B2 MICROGLOB TUMOR MARKER SER-MCNC: 2.7 MG/L
GAMMA GLOB SERPL ELPH-MCNC: 0.9 G/DL (ref 0.7–1.6)
IGA SERPL-MCNC: 25 MG/DL (ref 84–499)
IGG SERPL-MCNC: 1105 MG/DL (ref 610–1616)
IGM SERPL-MCNC: 43 MG/DL (ref 35–242)
KAPPA LC FREE SER-MCNC: 0.84 MG/DL (ref 0.33–1.94)
KAPPA LC FREE/LAMBDA FREE SER NEPH: 1.83 {RATIO} (ref 0.26–1.65)
LAMBDA LC FREE SERPL-MCNC: 0.46 MG/DL (ref 0.57–2.63)
M PROTEIN SERPL ELPH-MCNC: 0.4 G/DL
PROT PATTERN SERPL ELPH-IMP: ABNORMAL
VISC SER: 1.6 CP (ref 1.4–1.8)

## 2023-02-15 PROCEDURE — 97110 THERAPEUTIC EXERCISES: CPT | Mod: GP

## 2023-02-15 PROCEDURE — 97140 MANUAL THERAPY 1/> REGIONS: CPT | Mod: GP

## 2023-02-19 LAB
ALP BONE SERPL-CCNC: 31 U/L
ALP LIVER SERPL-CCNC: 125 U/L
ALP OTHER SERPL-CCNC: 0 U/L
ALP SERPL-CCNC: 156 U/L

## 2023-02-20 NOTE — PROGRESS NOTES
Oncology Follow-up Visit    Reason for Visit:  Carmelita is a 72 year old woman with a diagnosis of multiple myeloma, who I am visiting with today for routine follow-up.     Nursing Note and documentation reviewed:Yes    Interval History:   Doing well. No concerns. No recent signs of infection. No bleeding concerns. Eating well. Energy levels are good. Was out last night snow blowing at 7pm and felt well. Looking forward to a painting seminar in June. Overall, doing quite well.     Oncologic History  12/31/2018 Presented to the emergency room with vertigo and fatigue.  CT scan of the head was negative and subsequent stress test was negative.  05/03/2019 PCP ordered lab work and noted a total protein of 12.9.  SPEP at that time showed an M spike of 6.2 with a large monoclonal protein seen in the gamma fraction. Urine immunofixation showed a possible small protein band in the gamma fraction  05/31/2019 Evaluated by Dr. Walker with Medical Oncology with plan to rule out myeloma and obtain bone marrow aspiration biopsy as well as a metastatic bone survey along with additional labwork  06/18/2019 Underwent bone marrow aspiration and biopsy  06/24/2019 Seen again by Dr. Walker and CBC showed a hemoglobin of 9.3, M spike 7.3 with monoclonal IgG immunoglobulin of kappa light chain type; serum viscosity was 2.9; quantitative immunoglobulins showed an IgG of 8160, beta-2 microglobulin was 5.8, BUN was 21 with creatinine is 0.8 and total protein was 13.7.  Quantitative kappa/lambda free light chains showed an elevated ratio of 17.0 bone marrow aspiration biopsy showed plasma cell myeloma with approximately 80% plasma cells.  Immunofixation showed IgG kappa and flow cytometry revealed kappa monotypic plasma cells consistent with clonal plasma cell neoplasm and FISH panel was pending at that time.  It was felt she had at least stage II disease based on her beta-2 microglobulin and anemia.  Plan was to treat with Velcade 1.3 mg  per/m2 days 1, 4, 8 and 11/Decadron 40 mg on days 1, 8 and 15. Initiation of Revlimid with C2 at 25 mg daily days 1 through 14.  Plan was to also obtain an MRI of the lumbar spine to rule out lytic lesion at L3.  She was initiated on Zovirax 400 mg p.o. twice daily.  06/25/2019 C1 of chemotherapy initiated  07/01/2019 Note in chart regarding patient's large co-pay for the Revlimid and no plan at this point to initiate Revlimid and treat only with Velcade and Decadron per Dr. Walker  07/11/2019 MRI lumbar spine shows a pathologic superior endplate compression fracture at L3 without evidence of retropulsed fragment and innumerable enhancing lesions throughout the lumbar spine consistent with history of multiple myeloma.  09/10/2019 Increasing M-spike and kappa lambda ratio  10/01/2019 Initiation of CyBorD  11/17/2020 Plateau of M-spike at 1.2 after 36 cycles of CyBorD  12/01/2020 Initiation of Darzalex Faspro injection  03/23/2021 M-spike increased form 1.0 to 1.2 and velcade and dexamethasone added to plan  04/12/2022 Treatment HOLIDAY commenced with Mspike 0.3 after 22 cycles of treatent  05/27/2022 Received Evusheld while on treatment holiday.      Current Chemo Regime/TX: Darzalex faspro injection 1800mg subcutaneous per protocol on Day 1 with velcade 1.3mg/m2 on days 1, 4, 8, 11 with weekly Dexamethasone, given every 21 days      **Zometa 4mg every 3 months    Current Cycle: Treatment holiday     Previous treatment:   Velcade 1.3 mg/m2 days 1, 4, 8 and 11 with Decadron 40 mg days 1, 8 and 15 x 4 cycles;   Velcade 1.5mg.m2/cyclophosphamide 150mg every 7 days on days 1,8,15 and 22/decadron 40mg days 1,8,15,22 ; Darzalex faspro injection 1800mg subcutaneous per protocol    Past Medical History:   Diagnosis Date     Arthritis      Cyst of breast      Depressive disorder      Diabetes mellitus, type 2 (H) 1/18/2021     Essential hypertension 10/1/2015     Major depressive disorder, recurrent episode, mild (H)  "10/1/2015     Mixed hyperlipidemia 10/1/2015     Multiple myeloma not having achieved remission (H) 6/24/2019     Other specified disorders of bladder 07/09/2012    Irritable Bladder     Seasonal allergies 10/1/2015     Unspecified essential hypertension 03/19/2007     Unspecified sinusitis (chronic) 09/05/2007       Past Surgical History:   Procedure Laterality Date     APPENDECTOMY       BONE MARROW BIOPSY, BONE SPECIMEN, NEEDLE/TROCAR N/A 6/18/2019    Procedure: BONE MARROW BIOPSY;  Surgeon: Maciej Sanz MD;  Location: [facility]     CHOLECYSTECTOMY       COLONOSCOPY  07-    repeat 10 years     COLONOSCOPY N/A 12/30/2016    Procedure: COLONOSCOPY;  Surgeon: Bhaskar Franklin DO;  Location: [facility]     PUNC/ASPIR BREAST CYST Left 1995    benign     SINUS SURGERY       TUBAL STERILIZATION         Family History   Problem Relation Age of Onset     Breast Cancer Mother 60        Cause of Death     Parkinsonism Father         \"Possible\"     Coronary Artery Disease Father      Pacemaker Father      Thyroid Disease Daughter      Breast Cancer Maternal Aunt         over 50     Diabetes No family hx of      Hypertension No family hx of      Hyperlipidemia No family hx of      Cerebrovascular Disease No family hx of      Colon Cancer No family hx of      Prostate Cancer No family hx of      Genetic Disorder No family hx of      Asthma No family hx of      Anesthesia Reaction No family hx of        Social History     Socioeconomic History     Marital status:      Spouse name: Not on file     Number of children: Not on file     Years of education: Not on file     Highest education level: Not on file   Occupational History     Occupation: Financial     Comment:  - ([employer])   Tobacco Use     Smoking status: Never     Smokeless tobacco: Never     Tobacco comments:     Tried to Quit (YES); QUIT in 1971; Passive Exposure (NO)   Substance and Sexual Activity     Alcohol use: Yes     Comment: RARELY     " Drug use: No     Sexual activity: Yes     Partners: Male     Birth control/protection: None   Other Topics Concern      Service Not Asked     Blood Transfusions Not Asked     Caffeine Concern Yes     Comment: Coffee >6 cups daily     Occupational Exposure Not Asked     Hobby Hazards Not Asked     Sleep Concern Not Asked     Stress Concern Not Asked     Weight Concern Not Asked     Special Diet Not Asked     Back Care Not Asked     Exercise Not Asked     Bike Helmet Not Asked     Seat Belt Not Asked     Self-Exams Not Asked     Parent/sibling w/ CABG, MI or angioplasty before 65F 55M? No   Social History Narrative     Not on file     Social Determinants of Health     Financial Resource Strain: Not on file   Food Insecurity: Not on file   Transportation Needs: Not on file   Physical Activity: Not on file   Stress: Not on file   Social Connections: Not on file   Intimate Partner Violence: Not on file   Housing Stability: Not on file       Current Outpatient Medications   Medication     aspirin (ASA) 81 MG chewable tablet     atorvastatin (LIPITOR) 10 MG tablet     diphenhydrAMINE (BENADRYL) 25 MG capsule     EPINEPHrine (EPIPEN) 0.3 MG/0.3ML injection     fluticasone (FLONASE) 50 MCG/ACT nasal spray     L-Lysine HCl 500 MG CAPS     losartan (COZAAR) 50 MG tablet     sertraline (ZOLOFT) 50 MG tablet     No current facility-administered medications for this visit.        Allergies   Allergen Reactions     Lisinopril Cough     Phenylephrine Hcl Other (See Comments)     **Entex  HEADACHE (SEVERE)     Phenylpropanolamine Other (See Comments)     **Entex  HEADACHE (SEVERE)     Pseudoephedrine Tannate Other (See Comments)     **Entex  HEADACHE (SEVERE)     Levofloxacin Rash     **Levaquin     Moxifloxacin Hcl [Avelox] Rash       Review Of Systems:  A complete review of systems is negative except for the above mentioned items in the interval history.     Physical Exam:  /68   Pulse 83   Temp 97.4  F (36.3  C)  "(Tympanic)   Resp 18   Ht 1.6 m (5' 3\")   Wt 75.7 kg (166 lb 14.2 oz)   SpO2 96%   BMI 29.56 kg/m    GENERAL APPEARANCE: Healthy, alert and in no acute distress.  HEENT: Eyes appear normal without scleral icterus. Extraocular movements intact.   NECK:   Supple with normal range of motion. No asymmetry or masses.  LYMPHATICS: No palpable cervical, supraclavicular nodes.  RESP: Lungs clear to auscultation bilaterally, respirations regular and easy.  CARDIOVASCULAR: Regular rate and rhythm. Normal S1, S2; no murmur, gallop, or rub.  ABDOMEN: Soft, non-tender, non-distended. Bowel sounds auscultated all 4 quadrants. No palpable organomegaly or masses.  MUSCULOSKELETAL: Extremities without gross deformities noted. No edema of bilateral lower extremities.  SKIN: No suspicious lesions or rashes.  NEURO: Alert and oriented x 3.  Gait steady.  PSYCHIATRIC: Mentation and affect appear normal.  Mood appropriate.      Laboratory:   Component      Latest Ref Rng & Units 2/14/2023   WBC      4.0 - 11.0 10e3/uL 5.6   RBC Count      3.80 - 5.20 10e6/uL 4.70   Hemoglobin      11.7 - 15.7 g/dL 14.1   Hematocrit      35.0 - 47.0 % 41.8   MCV      78 - 100 fL 89   MCH      26.5 - 33.0 pg 30.0   MCHC      31.5 - 36.5 g/dL 33.7   RDW      10.0 - 15.0 % 13.8   Platelet Count      150 - 450 10e3/uL 251   % Neutrophils      % 49   % Lymphocytes      % 36   % Monocytes      % 10   % Eosinophils      % 3   % Basophils      % 1   % Immature Granulocytes      % 1   NRBCs per 100 WBC      <1 /100 0   Absolute Neutrophils      1.6 - 8.3 10e3/uL 2.7   Absolute Lymphocytes      0.8 - 5.3 10e3/uL 2.0   Absolute Monocytes      0.0 - 1.3 10e3/uL 0.6   Absolute Eosinophils      0.0 - 0.7 10e3/uL 0.2   Absolute Basophils      0.0 - 0.2 10e3/uL 0.0   Absolute Immature Granulocytes      <=0.4 10e3/uL 0.0   Absolute NRBCs      10e3/uL 0.0   Sodium      136 - 145 mmol/L 137   Potassium      3.4 - 5.3 mmol/L 4.3   Chloride      98 - 107 mmol/L 102 "   Carbon Dioxide (CO2)      22 - 29 mmol/L 27   Anion Gap      7 - 15 mmol/L 8   Urea Nitrogen      8.0 - 23.0 mg/dL 23.4 (H)   Creatinine      0.51 - 0.95 mg/dL 0.73   Calcium      8.8 - 10.2 mg/dL 9.5   Glucose      70 - 99 mg/dL 108 (H)   Alkaline Phosphatase      35 - 104 U/L 155 (H)   AST      10 - 35 U/L 19   ALT      10 - 35 U/L 15   Protein Total      6.4 - 8.3 g/dL 7.0   Albumin      3.5 - 5.2 g/dL 4.2   Bilirubin Total      <=1.2 mg/dL 0.3   GFR Estimate      >60 mL/min/1.73m2 86   Albumin Fraction      3.7 - 5.1 g/dL 4.0   Alpha 1 Fraction      0.2 - 0.4 g/dL 0.3   Alpha 2 Fraction      0.5 - 0.9 g/dL 0.7   Beta Fraction      0.6 - 1.0 g/dL 0.6   Gamma Fraction      0.7 - 1.6 g/dL 0.9   Monoclonal Peak      <=0.0 g/dL 0.4 (H)   ELP Interpretation:       Small monoclonal protein (about 0.4 g/dL) seen in the mid-gamma fraction. Previously characterized in our laboratory on 11/16/2022 as a monoclonal IgG immunoglobulin of kappa light chain type. Pathologic significance requires clinical correlation. BIA Izaguirre M.D., Ph.D., Pathologist ().   Alkptase Isoenzymes      40 - 120 U/L 156 (H)   Alkptase % Liver      0 - 94 U/L 125 (H)   Alkptase % Bone      0 - 55 U/L 31   Alkptase % Other      U/L 0   [place] Free Lt Chain      0.33 - 1.94 mg/dL 0.84   Lambda Free Lt Chain      0.57 - 2.63 mg/dL 0.46 (L)   Kappa Lambda Ratio      0.26 - 1.65 1.83 (H)   IGG      610 - 1,616 mg/dL 1,105   IGA      84 - 499 mg/dL 25 (L)   IGM      35 - 242 mg/dL 43   Viscosity Index      1.4 - 1.8 1.6   Beta-2-Microglobulin      <2.3 mg/L 2.7 (H)   Total Protein Serum for ELP      6.4 - 8.3 g/dL 6.6       Imaging Studies:  None this visit.     ASSESSMENT/PLAN:  #1 Multiple myeloma IgG kappa multiple myeloma with 80% involvement of the bone marrow diagnosed June 2019 initially treated with Velcade and Decadron and received 4 cycles with increasing M-spike and kappa/lambda ratio.  Treatment changed to CyBorD on  10/1/2019.  M-spike plateau at 1.2 and treatment changed to monotherapy with Darzalex Faspro injection. M spike declined to 1.0 then increased again to 1.2 so Velcade and Dex added to her treatment plan with cycle 5 therapy. Following C22 (April 2022), she did initiate a treatment holiday when her Mspike was 0.3. Today, labs are stable for the most part. IgG normal but does seem to be climbing. Mspike 0.4. We will continue treatment holiday for now and will see her back in 6 weeks, sooner with concerns.     #2 Lytic lesions She remains on calcium with vitamin D twice a day. Weight bearing activity. Will resume zometa with treatment. Currently on hold.    #3 Elevated Alk Phos Isoenzymes show that this is likely related to her liver. Will order an ultrasound of the liver and follow-up accordingly. Doesn't appear to be bone involved which is reassuring in regards to her myeloma.     Patient in agreement with plan and verbalizes understanding. Agrees to call with any questions or concerns.    35 minutes spent in the patient's encounter today with time spent in review of patient's chart along with chart preparation and review of the treatment plan and signing of treatment plan.  Time was also spent with the patient in obtaining a review of systems and performing a physical exam along with detailed review of all test results. Time was also spent in discussing plan for future follow-up and relating instructions for follow-up and in placing future orders.    Nida Gonzalez, FLO, FNP-C

## 2023-02-21 ENCOUNTER — ONCOLOGY VISIT (OUTPATIENT)
Dept: ONCOLOGY | Facility: OTHER | Age: 73
End: 2023-02-21
Attending: NURSE PRACTITIONER
Payer: COMMERCIAL

## 2023-02-21 VITALS
DIASTOLIC BLOOD PRESSURE: 68 MMHG | WEIGHT: 166.89 LBS | TEMPERATURE: 97.4 F | RESPIRATION RATE: 18 BRPM | BODY MASS INDEX: 29.57 KG/M2 | SYSTOLIC BLOOD PRESSURE: 112 MMHG | HEART RATE: 83 BPM | OXYGEN SATURATION: 96 % | HEIGHT: 63 IN

## 2023-02-21 DIAGNOSIS — R74.8 ELEVATED ALKALINE PHOSPHATASE LEVEL: ICD-10-CM

## 2023-02-21 DIAGNOSIS — C90.00 MULTIPLE MYELOMA NOT HAVING ACHIEVED REMISSION (H): Primary | ICD-10-CM

## 2023-02-21 PROCEDURE — 99214 OFFICE O/P EST MOD 30 MIN: CPT | Performed by: NURSE PRACTITIONER

## 2023-02-21 PROCEDURE — G0463 HOSPITAL OUTPT CLINIC VISIT: HCPCS

## 2023-02-21 ASSESSMENT — PAIN SCALES - GENERAL: PAINLEVEL: NO PAIN (0)

## 2023-02-21 NOTE — NURSING NOTE
"Oncology Rooming Note    February 21, 2023 10:42 AM   Carmelita Watts is a 72 year old female who presents for:    Chief Complaint   Patient presents with     Oncology Clinic Visit     Follow up. Multiple myeloma not having achieved remission      Initial Vitals: /68   Pulse 83   Temp 97.4  F (36.3  C) (Tympanic)   Resp 18   Ht 1.6 m (5' 3\")   Wt 75.7 kg (166 lb 14.2 oz)   SpO2 96%   BMI 29.56 kg/m   Estimated body mass index is 29.56 kg/m  as calculated from the following:    Height as of this encounter: 1.6 m (5' 3\").    Weight as of this encounter: 75.7 kg (166 lb 14.2 oz). Body surface area is 1.83 meters squared.  No Pain (0) Comment: Data Unavailable   No LMP recorded. Patient is postmenopausal.  Allergies reviewed: Yes  Medications reviewed: Yes    Medications: Medication refills not needed today.  Pharmacy name entered into [facility]:    [id number] - [place] - [address] AT [facility] [address]  [facility] - [place], [place] - [address]  [facility] - [place] - [address]    Clinical concerns: none       Julia Andrade LPN              "

## 2023-02-21 NOTE — PATIENT INSTRUCTIONS
Thank you for allowing me to be a part of your care today.    I would like to see you back in 6 weeks with all labs completed 1 weeks prior. In the interm, I would like you to have an ultrasound of your liver. I will call with the results of those once I see them.     If you have any questions please call [phone]    Other instructions:      None

## 2023-02-22 DIAGNOSIS — C90.01 MULTIPLE MYELOMA IN REMISSION (H): Primary | ICD-10-CM

## 2023-02-24 ENCOUNTER — HOSPITAL ENCOUNTER (OUTPATIENT)
Dept: PHYSICAL THERAPY | Facility: HOSPITAL | Age: 73
Setting detail: THERAPIES SERIES
Discharge: HOME | End: 2023-02-24
Attending: PHYSICIAN ASSISTANT
Payer: MEDICARE

## 2023-02-24 PROCEDURE — 97140 MANUAL THERAPY 1/> REGIONS: CPT | Mod: GP

## 2023-02-24 PROCEDURE — 97110 THERAPEUTIC EXERCISES: CPT | Mod: GP

## 2023-03-03 ENCOUNTER — HOSPITAL ENCOUNTER (OUTPATIENT)
Dept: PHYSICAL THERAPY | Facility: HOSPITAL | Age: 73
Setting detail: THERAPIES SERIES
Discharge: HOME | End: 2023-03-03
Attending: PHYSICIAN ASSISTANT
Payer: MEDICARE

## 2023-03-03 PROCEDURE — 97110 THERAPEUTIC EXERCISES: CPT | Mod: GP

## 2023-03-03 PROCEDURE — 97140 MANUAL THERAPY 1/> REGIONS: CPT | Mod: GP

## 2023-03-06 ENCOUNTER — HOSPITAL ENCOUNTER (OUTPATIENT)
Dept: ULTRASOUND IMAGING | Facility: HOSPITAL | Age: 73
Discharge: HOME | End: 2023-03-06
Attending: NURSE PRACTITIONER | Admitting: NURSE PRACTITIONER
Payer: MEDICARE

## 2023-03-06 DIAGNOSIS — R74.8 ELEVATED ALKALINE PHOSPHATASE LEVEL: ICD-10-CM

## 2023-03-06 PROCEDURE — 76705 ECHO EXAM OF ABDOMEN: CPT

## 2023-03-10 ENCOUNTER — HOSPITAL ENCOUNTER (OUTPATIENT)
Dept: PHYSICAL THERAPY | Facility: HOSPITAL | Age: 73
Setting detail: THERAPIES SERIES
Discharge: HOME | End: 2023-03-10
Attending: PHYSICIAN ASSISTANT
Payer: MEDICARE

## 2023-03-10 PROCEDURE — 97140 MANUAL THERAPY 1/> REGIONS: CPT | Mod: GP

## 2023-03-10 PROCEDURE — 97110 THERAPEUTIC EXERCISES: CPT | Mod: GP

## 2023-03-15 ENCOUNTER — ANCILLARY PROCEDURE (OUTPATIENT)
Dept: MAMMOGRAPHY | Facility: OTHER | Age: 73
End: 2023-03-15
Attending: PHYSICIAN ASSISTANT
Payer: MEDICARE

## 2023-03-15 DIAGNOSIS — Z12.31 VISIT FOR SCREENING MAMMOGRAM: ICD-10-CM

## 2023-03-15 PROCEDURE — 77067 SCR MAMMO BI INCL CAD: CPT | Mod: TC

## 2023-03-16 ENCOUNTER — TELEPHONE (OUTPATIENT)
Dept: MAMMOGRAPHY | Facility: OTHER | Age: 73
End: 2023-03-16

## 2023-03-17 ENCOUNTER — HOSPITAL ENCOUNTER (OUTPATIENT)
Dept: PHYSICAL THERAPY | Facility: HOSPITAL | Age: 73
Setting detail: THERAPIES SERIES
Discharge: HOME | End: 2023-03-17
Attending: PHYSICIAN ASSISTANT
Payer: MEDICARE

## 2023-03-17 PROCEDURE — 97140 MANUAL THERAPY 1/> REGIONS: CPT | Mod: GP

## 2023-03-17 PROCEDURE — 97110 THERAPEUTIC EXERCISES: CPT | Mod: GP

## 2023-03-23 DIAGNOSIS — H91.93 DECREASED HEARING OF BOTH EARS: Primary | ICD-10-CM

## 2023-03-27 NOTE — PROGRESS NOTES
Oncology Follow-up Visit    Reason for Visit:  Carmelita is a 72 year old woman with a diagnosis of multiple myeloma, who I am visiting with today for routine follow-up.     Nursing Note and documentation reviewed: Yes     Interval History:   Carlene notes that she is doing well. Without concerns today. Feeling well. No signs of infection. Continues to endorse issues with hemorrhoids, having consult with surgery in coming weeks. No other bleeding concerns. Working with ENT for fluid in ears. Appetite is good. Energy good, staying active needs a nap sometimes in afternoon. No new bony aches or pains. Overall, doing well. Started up with WEL program and being more active.      Oncologic History  12/31/2018 Presented to the emergency room with vertigo and fatigue.  CT scan of the head was negative and subsequent stress test was negative.  05/03/2019 PCP ordered lab work and noted a total protein of 12.9.  SPEP at that time showed an M spike of 6.2 with a large monoclonal protein seen in the gamma fraction. Urine immunofixation showed a possible small protein band in the gamma fraction  05/31/2019 Evaluated by Dr. Walker with Medical Oncology with plan to rule out myeloma and obtain bone marrow aspiration biopsy as well as a metastatic bone survey along with additional labwork  06/18/2019 Underwent bone marrow aspiration and biopsy  06/24/2019 Seen again by Dr. Walker and CBC showed a hemoglobin of 9.3, M spike 7.3 with monoclonal IgG immunoglobulin of kappa light chain type; serum viscosity was 2.9; quantitative immunoglobulins showed an IgG of 8160, beta-2 microglobulin was 5.8, BUN was 21 with creatinine is 0.8 and total protein was 13.7.  Quantitative kappa/lambda free light chains showed an elevated ratio of 17.0 bone marrow aspiration biopsy showed plasma cell myeloma with approximately 80% plasma cells.  Immunofixation showed IgG kappa and flow cytometry revealed kappa monotypic plasma cells consistent with clonal  plasma cell neoplasm and FISH panel was pending at that time. It was felt she had at least stage II disease based on her beta-2 microglobulin and anemia. Plan was to treat with Velcade 1.3 mg per/m2 days 1, 4, 8 and 11/Decadron 40 mg on days 1, 8 and 15. Initiation of Revlimid with C2 at 25 mg daily days 1 through 14.  Plan was to also obtain an MRI of the lumbar spine to rule out lytic lesion at L3.  She was initiated on Zovirax 400 mg p.o. twice daily.  06/25/2019 C1 of chemotherapy initiated  07/01/2019 Note in chart regarding patient's large co-pay for the Revlimid and no plan at this point to initiate Revlimid and treat only with Velcade and Decadron per Dr. Walker  07/11/2019 MRI lumbar spine shows a pathologic superior endplate compression fracture at L3 without evidence of retropulsed fragment and innumerable enhancing lesions throughout the lumbar spine consistent with history of multiple myeloma.  09/10/2019 Increasing M-spike and kappa lambda ratio  10/01/2019 Initiation of CyBorD  11/17/2020 Plateau of M-spike at 1.2 after 36 cycles of CyBorD  12/01/2020 Initiation of Darzalex Faspro injection  03/23/2021 M-spike increased form 1.0 to 1.2 and velcade and dexamethasone added to plan  04/12/2022 Treatment HOLIDAY commenced with Mspike 0.3 after 22 cycles of treatent  05/27/2022 Received Evusheld while on treatment holiday.      Current Chemo Regime/TX: Darzalex faspro injection 1800mg subcutaneous per protocol on Day 1 with velcade 1.3mg/m2 on days 1, 4, 8, 11 with weekly Dexamethasone, given every 21 days      **Zometa 4mg every 3 months    Current Cycle: Treatment holiday     Previous treatment:   Velcade 1.3 mg/m2 days 1, 4, 8 and 11 with Decadron 40 mg days 1, 8 and 15 x 4 cycles;   Velcade 1.5mg.m2/cyclophosphamide 150mg every 7 days on days 1,8,15 and 22/decadron 40mg days 1,8,15,22 ; Darzalex faspro injection 1800mg subcutaneous per protocol    Past Medical History:   Diagnosis Date     Arthritis   "    Cyst of breast      Depressive disorder      Diabetes mellitus, type 2 (H) 1/18/2021     Essential hypertension 10/1/2015     Major depressive disorder, recurrent episode, mild (H) 10/1/2015     Mixed hyperlipidemia 10/1/2015     Multiple myeloma not having achieved remission (H) 6/24/2019     Other specified disorders of bladder 07/09/2012    Irritable Bladder     Seasonal allergies 10/1/2015     Unspecified essential hypertension 03/19/2007     Unspecified sinusitis (chronic) 09/05/2007       Past Surgical History:   Procedure Laterality Date     APPENDECTOMY       BONE MARROW BIOPSY, BONE SPECIMEN, NEEDLE/TROCAR N/A 6/18/2019    Procedure: BONE MARROW BIOPSY;  Surgeon: Maciej Sanz MD;  Location: [facility] OR     CHOLECYSTECTOMY       COLONOSCOPY  07-    repeat 10 years     COLONOSCOPY N/A 12/30/2016    Procedure: COLONOSCOPY;  Surgeon: Bhaskar Franklin DO;  Location: [facility] OR     PUNC/ASPIR BREAST CYST Left 1995    benign     SINUS SURGERY       TUBAL STERILIZATION         Family History   Problem Relation Age of Onset     Breast Cancer Mother 60        Cause of Death     Parkinsonism Father         \"Possible\"     Coronary Artery Disease Father      Pacemaker Father      Thyroid Disease Daughter      Breast Cancer Maternal Aunt         over 50     Diabetes No family hx of      Hypertension No family hx of      Hyperlipidemia No family hx of      Cerebrovascular Disease No family hx of      Colon Cancer No family hx of      Prostate Cancer No family hx of      Genetic Disorder No family hx of      Asthma No family hx of      Anesthesia Reaction No family hx of        Social History     Socioeconomic History     Marital status:      Spouse name: Not on file     Number of children: Not on file     Years of education: Not on file     Highest education level: Not on file   Occupational History     Occupation: Financial     Comment:  - ([employer])   Tobacco Use     Smoking status: Never     " Smokeless tobacco: Never     Tobacco comments:     Tried to Quit (YES); QUIT in 1971; Passive Exposure (NO)   Vaping Use     Vaping status: Never Used     Passive vaping exposure: Yes   Substance and Sexual Activity     Alcohol use: Yes     Comment: RARELY     Drug use: No     Sexual activity: Yes     Partners: Male     Birth control/protection: None   Other Topics Concern      Service Not Asked     Blood Transfusions Not Asked     Caffeine Concern Yes     Comment: Coffee >6 cups daily     Occupational Exposure Not Asked     Hobby Hazards Not Asked     Sleep Concern Not Asked     Stress Concern Not Asked     Weight Concern Not Asked     Special Diet Not Asked     Back Care Not Asked     Exercise Not Asked     Bike Helmet Not Asked     Seat Belt Not Asked     Self-Exams Not Asked     Parent/sibling w/ CABG, MI or angioplasty before 65F 55M? No   Social History Narrative     Not on file     Social Determinants of Health     Financial Resource Strain: Not on file   Food Insecurity: Not on file   Transportation Needs: Not on file   Physical Activity: Not on file   Stress: Not on file   Social Connections: Not on file   Intimate Partner Violence: Not on file   Housing Stability: Not on file       Current Outpatient Medications   Medication     aspirin (ASA) 81 MG chewable tablet     atorvastatin (LIPITOR) 10 MG tablet     diphenhydrAMINE (BENADRYL) 25 MG capsule     fluticasone (FLONASE) 50 MCG/ACT nasal spray     hydrocortisone, Perianal, (HYDROCORTISONE) 2.5 % cream     L-Lysine HCl 500 MG CAPS     losartan (COZAAR) 50 MG tablet     sertraline (ZOLOFT) 50 MG tablet     EPINEPHrine (EPIPEN) 0.3 MG/0.3ML injection     No current facility-administered medications for this visit.        Allergies   Allergen Reactions     Lisinopril Cough     Phenylephrine Hcl Other (See Comments)     **Entex  HEADACHE (SEVERE)     Phenylpropanolamine Other (See Comments)     **Entex  HEADACHE (SEVERE)     Pseudoephedrine Tannate  "Other (See Comments)     **Entex  HEADACHE (SEVERE)     Levofloxacin Rash     **Levaquin     Moxifloxacin Hcl [Avelox] Rash       Review Of Systems:  A complete review of systems is negative except for the above mentioned items in the interval history.     Physical Exam:  /74   Pulse 86   Temp 98.5  F (36.9  C) (Tympanic)   Resp 19   Ht 1.6 m (5' 3\")   Wt 75.6 kg (166 lb 10.7 oz)   SpO2 98%   BMI 29.52 kg/m    GENERAL APPEARANCE: Healthy, alert and in no acute distress.  HEENT: Eyes appear normal without scleral icterus. Extraocular movements intact.   NECK:   Supple with normal range of motion. No asymmetry or masses.  LYMPHATICS: No palpable cervical, supraclavicular nodes.  RESP: Lungs clear to auscultation bilaterally, respirations regular and easy.  CARDIOVASCULAR: Regular rate and rhythm. Normal S1, S2; no murmur, gallop, or rub.  ABDOMEN: Soft, non-tender, non-distended. Bowel sounds auscultated all 4 quadrants. No palpable organomegaly or masses.  MUSCULOSKELETAL: Extremities without gross deformities noted. No edema of bilateral lower extremities.  SKIN: No suspicious lesions or rashes.  NEURO: Alert and oriented x 3.  Gait steady.  PSYCHIATRIC: Mentation and affect appear normal.  Mood appropriate.    Laboratory:   Component      Latest Ref [facility] 3/28/2023   WBC      4.0 - 11.0 10e3/uL 5.5    RBC Count      3.80 - 5.20 10e6/uL 4.88    Hemoglobin      11.7 - 15.7 g/dL 14.4    Hematocrit      35.0 - 47.0 % 43.5    MCV      78 - 100 fL 89    MCH      26.5 - 33.0 pg 29.5    MCHC      31.5 - 36.5 g/dL 33.1    RDW      10.0 - 15.0 % 14.2    Platelet Count      150 - 450 10e3/uL 233    % Neutrophils      % 44    % Lymphocytes      % 40    % Monocytes      % 11    % Eosinophils      % 4    % Basophils      % 1    % Immature Granulocytes      % 0    NRBCs per 100 WBC      <1 /100 0    Absolute Neutrophils      1.6 - 8.3 10e3/uL 2.4    Absolute Lymphocytes      0.8 - 5.3 10e3/uL 2.2    Absolute " Monocytes      0.0 - 1.3 10e3/uL 0.6    Absolute Eosinophils      0.0 - 0.7 10e3/uL 0.2    Absolute Basophils      0.0 - 0.2 10e3/uL 0.1    Absolute Immature Granulocytes      <=0.4 10e3/uL 0.0    Absolute NRBCs      10e3/uL 0.0    Sodium      136 - 145 mmol/L 136    Potassium      3.4 - 5.3 mmol/L 4.1    Chloride      98 - 107 mmol/L 99    Carbon Dioxide (CO2)      22 - 29 mmol/L 28    Anion Gap      7 - 15 mmol/L 9    Urea Nitrogen      8.0 - 23.0 mg/dL 16.7    Creatinine      0.51 - 0.95 mg/dL 0.74    Calcium      8.8 - 10.2 mg/dL 9.7    Glucose      70 - 99 mg/dL 109 (H)    Alkaline Phosphatase      35 - 104 U/L 145 (H)    AST      10 - 35 U/L 24    ALT      10 - 35 U/L 17    Protein Total      6.4 - 8.3 g/dL 7.5    Albumin      3.5 - 5.2 g/dL 4.3    Bilirubin Total      <=1.2 mg/dL 0.4    GFR Estimate      >60 mL/min/1.73m2 84    Albumin Fraction      3.7 - 5.1 g/dL 4.4    Alpha 1 Fraction      0.2 - 0.4 g/dL 0.3    Alpha 2 Fraction      0.5 - 0.9 g/dL 0.8    Beta Fraction      0.6 - 1.0 g/dL 0.7    Gamma Fraction      0.7 - 1.6 g/dL 1.1    Monoclonal Peak      <=0.0 g/dL 0.4 (H)    ELP Interpretation: Small monoclonal protein (about 0.4 g/dL) seen in the mid-gamma fraction. Previously characterized in our laboratory on 11/16/2022 as a monoclonal IgG immunoglobulin of kappa light chain type. Pathologic significance requires clinical correlation. Basim Tomlinson M.D., Ph.D., Pathologist.    [place] Free Lt Chain      0.33 - 1.94 mg/dL 0.82    Lambda Free Lt Chain      0.57 - 2.63 mg/dL 1.10    Kappa Lambda Ratio      0.26 - 1.65  0.75    IGG      610 - 1,616 mg/dL 1,162    IGA      84 - 499 mg/dL 29 (L)    IGM      35 - 242 mg/dL 57    Viscosity Index      1.4 - 1.8  1.6    Beta-2-Microglobulin      <2.3 mg/L 2.5 (H)    Total Protein Serum for ELP      6.4 - 8.3 g/dL 7.2       Legend:  (H) High  (L) Low      Imaging Studies:  None this visit.     ASSESSMENT/PLAN:  #1 Multiple myeloma IgG kappa multiple myeloma  with 80% involvement of the bone marrow diagnosed June 2019 initially treated with Velcade and Decadron and received 4 cycles with increasing M-spike and kappa/lambda ratio.  Treatment changed to CyBorD on 10/1/2019.  M-spike plateau at 1.2 and treatment changed to monotherapy with Darzalex Faspro injection. M spike declined to 1.0 then increased again to 1.2 so Velcade and Dex added to her treatment plan with cycle 5 therapy. Following C22 (April 2022), she did initiate a treatment holiday when her Mspike was 0.3.     Mspike has slowly risen in recent months. Stable this month at 0.4. Light chain ratio improved. No evidence of end organ damage. Overall doing and feeling well. I see no contraindications to us continuing treatment holiday. I will plan on seeing her back in about 6 weeks with labs completed prior, sooner with any concerns.     #2 Lytic lesions She remains on calcium with vitamin D twice a day. Weight bearing activity. Will resume zometa with treatment. Currently on hold.    #3 Elevated Alk Phos Isoenzymes show that this is likely related to her liver. Ultrasound of liver unremarkable. Patient on lipitor. Will route note to PCP as she will be seeing patient in about a month.     Patient in agreement with plan and verbalizes understanding. Agrees to call with any questions or concerns.    40 minutes spent in the patient's encounter today with time spent in review of patient's chart along with chart preparation and review of the treatment plan and signing of treatment plan.  Time was also spent with the patient in obtaining a review of systems and performing a physical exam along with detailed review of all test results. Time was also spent in discussing plan for future follow-up and relating instructions for follow-up and in placing future orders.    Nida Gonzalez, FLO, FNP-C

## 2023-03-28 ENCOUNTER — LAB (OUTPATIENT)
Dept: LAB | Facility: OTHER | Age: 73
End: 2023-03-28
Payer: MEDICARE

## 2023-03-28 DIAGNOSIS — C90.00 MULTIPLE MYELOMA NOT HAVING ACHIEVED REMISSION (H): ICD-10-CM

## 2023-03-28 DIAGNOSIS — R74.8 ELEVATED ALKALINE PHOSPHATASE LEVEL: ICD-10-CM

## 2023-03-28 LAB
ALBUMIN SERPL BCG-MCNC: 4.3 G/DL (ref 3.5–5.2)
ALP SERPL-CCNC: 145 U/L (ref 35–104)
ALT SERPL W P-5'-P-CCNC: 17 U/L (ref 10–35)
ANION GAP SERPL CALCULATED.3IONS-SCNC: 9 MMOL/L (ref 7–15)
AST SERPL W P-5'-P-CCNC: 24 U/L (ref 10–35)
BASOPHILS # BLD AUTO: 0.1 10E3/UL (ref 0–0.2)
BASOPHILS NFR BLD AUTO: 1 %
BILIRUB SERPL-MCNC: 0.4 MG/DL
BUN SERPL-MCNC: 16.7 MG/DL (ref 8–23)
CALCIUM SERPL-MCNC: 9.7 MG/DL (ref 8.8–10.2)
CHLORIDE SERPL-SCNC: 99 MMOL/L (ref 98–107)
CREAT SERPL-MCNC: 0.74 MG/DL (ref 0.51–0.95)
DEPRECATED HCO3 PLAS-SCNC: 28 MMOL/L (ref 22–29)
EOSINOPHIL # BLD AUTO: 0.2 10E3/UL (ref 0–0.7)
EOSINOPHIL NFR BLD AUTO: 4 %
ERYTHROCYTE [DISTWIDTH] IN BLOOD BY AUTOMATED COUNT: 14.2 % (ref 10–15)
GFR SERPL CREATININE-BSD FRML MDRD: 84 ML/MIN/1.73M2
GLUCOSE SERPL-MCNC: 109 MG/DL (ref 70–99)
HCT VFR BLD AUTO: 43.5 % (ref 35–47)
HGB BLD-MCNC: 14.4 G/DL (ref 11.7–15.7)
IMM GRANULOCYTES # BLD: 0 10E3/UL
IMM GRANULOCYTES NFR BLD: 0 %
LYMPHOCYTES # BLD AUTO: 2.2 10E3/UL (ref 0.8–5.3)
LYMPHOCYTES NFR BLD AUTO: 40 %
MCH RBC QN AUTO: 29.5 PG (ref 26.5–33)
MCHC RBC AUTO-ENTMCNC: 33.1 G/DL (ref 31.5–36.5)
MCV RBC AUTO: 89 FL (ref 78–100)
MONOCYTES # BLD AUTO: 0.6 10E3/UL (ref 0–1.3)
MONOCYTES NFR BLD AUTO: 11 %
NEUTROPHILS # BLD AUTO: 2.4 10E3/UL (ref 1.6–8.3)
NEUTROPHILS NFR BLD AUTO: 44 %
NRBC # BLD AUTO: 0 10E3/UL
NRBC BLD AUTO-RTO: 0 /100
PLATELET # BLD AUTO: 233 10E3/UL (ref 150–450)
POTASSIUM SERPL-SCNC: 4.1 MMOL/L (ref 3.4–5.3)
PROT SERPL-MCNC: 7.5 G/DL (ref 6.4–8.3)
RBC # BLD AUTO: 4.88 10E6/UL (ref 3.8–5.2)
SODIUM SERPL-SCNC: 136 MMOL/L (ref 136–145)
TOTAL PROTEIN SERUM FOR ELP: 7.2 G/DL (ref 6.4–8.3)
WBC # BLD AUTO: 5.5 10E3/UL (ref 4–11)

## 2023-03-28 PROCEDURE — 84165 PROTEIN E-PHORESIS SERUM: CPT | Mod: 26

## 2023-03-28 PROCEDURE — 84155 ASSAY OF PROTEIN SERUM: CPT | Mod: ZL

## 2023-03-28 PROCEDURE — 85025 COMPLETE CBC W/AUTO DIFF WBC: CPT | Mod: ZL

## 2023-03-28 PROCEDURE — 82232 ASSAY OF BETA-2 PROTEIN: CPT | Mod: ZL

## 2023-03-28 PROCEDURE — 36415 COLL VENOUS BLD VENIPUNCTURE: CPT | Mod: ZL

## 2023-03-28 PROCEDURE — 80053 COMPREHEN METABOLIC PANEL: CPT | Mod: ZL

## 2023-03-28 PROCEDURE — 84165 PROTEIN E-PHORESIS SERUM: CPT | Mod: ZL | Performed by: PATHOLOGY

## 2023-03-28 PROCEDURE — 83521 IG LIGHT CHAINS FREE EACH: CPT | Mod: ZL,91

## 2023-03-28 PROCEDURE — 85810 BLOOD VISCOSITY EXAMINATION: CPT | Mod: ZL

## 2023-03-28 PROCEDURE — 82784 ASSAY IGA/IGD/IGG/IGM EACH: CPT | Mod: ZL

## 2023-03-29 ENCOUNTER — TELEPHONE (OUTPATIENT)
Dept: FAMILY MEDICINE | Facility: OTHER | Age: 73
End: 2023-03-29

## 2023-03-29 LAB
ALBUMIN SERPL ELPH-MCNC: 4.4 G/DL (ref 3.7–5.1)
ALPHA1 GLOB SERPL ELPH-MCNC: 0.3 G/DL (ref 0.2–0.4)
ALPHA2 GLOB SERPL ELPH-MCNC: 0.8 G/DL (ref 0.5–0.9)
B-GLOBULIN SERPL ELPH-MCNC: 0.7 G/DL (ref 0.6–1)
B2 MICROGLOB TUMOR MARKER SER-MCNC: 2.5 MG/L
GAMMA GLOB SERPL ELPH-MCNC: 1.1 G/DL (ref 0.7–1.6)
IGA SERPL-MCNC: 29 MG/DL (ref 84–499)
IGG SERPL-MCNC: 1162 MG/DL (ref 610–1616)
IGM SERPL-MCNC: 57 MG/DL (ref 35–242)
KAPPA LC FREE SER-MCNC: 0.82 MG/DL (ref 0.33–1.94)
KAPPA LC FREE/LAMBDA FREE SER NEPH: 0.75 {RATIO} (ref 0.26–1.65)
LAMBDA LC FREE SERPL-MCNC: 1.1 MG/DL (ref 0.57–2.63)
M PROTEIN SERPL ELPH-MCNC: 0.4 G/DL
PROT PATTERN SERPL ELPH-IMP: ABNORMAL
VISC SER: 1.6 CP (ref 1.4–1.8)

## 2023-03-29 NOTE — TELEPHONE ENCOUNTER
9:33 AM    Reason for Call: OVERBOOK    Patient is having the following symptoms: Patient needs to be seen for consult on hemorrhoids. days.    The patient is requesting an appointment for Overbook with Kenyatta Trujillo.    Was an appointment offered for this call? Yes  If yes : Appointment type              Date  05/23    Preferred method for responding to this message: Telephone Call  What is your phone number ?  [phone]    If we cannot reach you directly, may we leave a detailed response at the number you provided? Yes    Can this message wait until your PCP/provider returns, if unavailable today? YES, Provider is in today    Priscilla Arias

## 2023-03-30 ENCOUNTER — OFFICE VISIT (OUTPATIENT)
Dept: OTOLARYNGOLOGY | Facility: OTHER | Age: 73
End: 2023-03-30
Attending: PHYSICIAN ASSISTANT
Payer: MEDICARE

## 2023-03-30 ENCOUNTER — OFFICE VISIT (OUTPATIENT)
Dept: AUDIOLOGY | Facility: OTHER | Age: 73
End: 2023-03-30
Attending: AUDIOLOGIST
Payer: MEDICARE

## 2023-03-30 ENCOUNTER — ALLIED HEALTH/NURSE VISIT (OUTPATIENT)
Dept: AUDIOLOGY | Facility: OTHER | Age: 73
End: 2023-03-30
Attending: AUDIOLOGIST
Payer: MEDICARE

## 2023-03-30 VITALS
BODY MASS INDEX: 28.53 KG/M2 | HEIGHT: 63 IN | OXYGEN SATURATION: 97 % | SYSTOLIC BLOOD PRESSURE: 100 MMHG | DIASTOLIC BLOOD PRESSURE: 74 MMHG | WEIGHT: 161 LBS | TEMPERATURE: 97.3 F | HEART RATE: 92 BPM

## 2023-03-30 DIAGNOSIS — H91.93 DECREASED HEARING OF BOTH EARS: ICD-10-CM

## 2023-03-30 DIAGNOSIS — C90.00 MULTIPLE MYELOMA NOT HAVING ACHIEVED REMISSION (H): ICD-10-CM

## 2023-03-30 DIAGNOSIS — H69.92 DYSFUNCTION OF LEFT EUSTACHIAN TUBE: ICD-10-CM

## 2023-03-30 DIAGNOSIS — H72.92 PERFORATION OF TYMPANIC MEMBRANE, LEFT: Primary | ICD-10-CM

## 2023-03-30 DIAGNOSIS — H90.A21 SENSORINEURAL HEARING LOSS (SNHL) OF RIGHT EAR WITH RESTRICTED HEARING OF LEFT EAR: ICD-10-CM

## 2023-03-30 DIAGNOSIS — H91.93 DECREASED HEARING OF BOTH EARS: Primary | ICD-10-CM

## 2023-03-30 DIAGNOSIS — H92.12 OTORRHEA, LEFT: ICD-10-CM

## 2023-03-30 DIAGNOSIS — H90.A32 MIXED CONDUCTIVE AND SENSORINEURAL HEARING LOSS OF LEFT EAR WITH RESTRICTED HEARING OF RIGHT EAR: Primary | ICD-10-CM

## 2023-03-30 DIAGNOSIS — Z98.890 H/O MYRINGOTOMY: ICD-10-CM

## 2023-03-30 PROCEDURE — 92567 TYMPANOMETRY: CPT | Performed by: AUDIOLOGIST

## 2023-03-30 PROCEDURE — G0463 HOSPITAL OUTPT CLINIC VISIT: HCPCS

## 2023-03-30 PROCEDURE — 99213 OFFICE O/P EST LOW 20 MIN: CPT | Mod: 25 | Performed by: PHYSICIAN ASSISTANT

## 2023-03-30 PROCEDURE — 92504 EAR MICROSCOPY EXAMINATION: CPT | Performed by: PHYSICIAN ASSISTANT

## 2023-03-30 PROCEDURE — 92557 COMPREHENSIVE HEARING TEST: CPT | Performed by: AUDIOLOGIST

## 2023-03-30 PROCEDURE — V5299 HEARING SERVICE: HCPCS

## 2023-03-30 ASSESSMENT — PAIN SCALES - GENERAL: PAINLEVEL: NO PAIN (0)

## 2023-03-30 NOTE — LETTER
3/30/2023         RE: Margarita Watts  [address] [facility] [address]        Dear Colleague,    Thank you for referring your patient, Margarita Watts, to the [facility]. Please see a copy of my visit note below.    Chief Complaint   Patient presents with     Ear Problem     Follow up left TM perforation          Patient returns to ENT for follow-up exam.  Patient was last seen on 8/9/22 for follow up exam.       Today, margarita has been doing well overall. She has used ear drops intermittently. At times, she feels some discomfort.   Denies crusting.      Denies otalgia, otorrhea  Denies worrisome tinnitus  Denies fluctuating hearing loss or tinnitus.   Denies vertigo or facial paraesthesia.    Right tube has been absent and right TM intact without effusion.  Left TM does have small perforation noted.   BTTI placed on 9/20/19 by Dr. Ley in office      Past Medical History:   Diagnosis Date     Arthritis      Cyst of breast      Depressive disorder      Diabetes mellitus, type 2 (H) 1/18/2021     Essential hypertension 10/1/2015     Major depressive disorder, recurrent episode, mild (H) 10/1/2015     Mixed hyperlipidemia 10/1/2015     Multiple myeloma not having achieved remission (H) 6/24/2019     Other specified disorders of bladder 07/09/2012    Irritable Bladder     Seasonal allergies 10/1/2015     Unspecified essential hypertension 03/19/2007     Unspecified sinusitis (chronic) 09/05/2007        Allergies   Allergen Reactions     Lisinopril Cough     Phenylephrine Hcl Other (See Comments)     **Entex  HEADACHE (SEVERE)     Phenylpropanolamine Other (See Comments)     **Entex  HEADACHE (SEVERE)     Pseudoephedrine Tannate Other (See Comments)     **Entex  HEADACHE (SEVERE)     Levofloxacin Rash     **Levaquin     Moxifloxacin Hcl [Avelox] Rash     Current Outpatient Medications   Medication     aspirin (ASA) 81 MG chewable tablet     atorvastatin (LIPITOR) 10 MG tablet      "diphenhydrAMINE (BENADRYL) 25 MG capsule     EPINEPHrine (EPIPEN) 0.3 MG/0.3ML injection     fluticasone (FLONASE) 50 MCG/ACT nasal spray     L-Lysine HCl 500 MG CAPS     losartan (COZAAR) 50 MG tablet     sertraline (ZOLOFT) 50 MG tablet     No current facility-administered medications for this visit.     ROS- SEE HPI  /74   Pulse 92   Temp 97.3  F (36.3  C) (Tympanic)   Ht 1.6 m (5' 3\")   Wt 73 kg (161 lb)   SpO2 97%   BMI 28.52 kg/m      General - The patient is well nourished and well developed, and appears to have good nutritional status.    Head and Face - Normocephalic and atraumatic, with no gross asymmetry noted of the contour of the facial features.  The facial nerve is intact, with strong symmetric movements.  Eyes - Extraocular movements intact, and the pupils were reactive to light.  Sclera were not icteric or injected, conjunctiva were pink and moist.  Mouth - Examination of the oral cavity shows pink, healthy, moist mucosa.  No lesions or ulceration noted.  The dentition are in good repair.  The tongue is mobile and midline.  Ears - Examination of the ears showed- Right canal clear. Right TM is intact without effusion or perforation. LEFT Ear examined under otologic microscopy. there is dried crusting throughout EAC This was removed with #5 suction, loop and cupped forceps. There is thick mucoid debris along TM. This was suctioned. ME appeared dry.   Left TM is with perforation, central anterior about 15%. ME appears healthy   Eyes - Extraocular movements intact, and the pupils were reactive to light.  Sclera were not icteric or injected, conjunctiva were pink and moist.  Mouth - Examination of the oral cavity showed pink, healthy oral mucosa. No lesions or ulcerations noted.  The tongue was mobile and midline, and the dentition were in good condition.    Throat - The walls of the oropharynx were smooth, pink, moist, symmetric, and had no lesions or ulcerations.  The tonsillar pillars and " soft palate were symmetric.  The uvula was midline on elevation.    Neck - Normal midline excursion of the laryngotracheal complex during swallowing.  Full range of motion on passive movement.  Palpation of the occipital, submental, submandibular, internal jugular chain, and supraclavicular nodes did not demonstrate any abnormal lymph nodes or masses.  Palpation of the thyroid was soft and smooth, with no nodules or goiter appreciated.  The trachea was mobile and midline.     ASSESSMENT/ PLAN:    ICD-10-CM    1. Perforation of tympanic membrane, left  H72.92       2. Otorrhea, left  H92.12       3. H/O myringotomy w/ tube placement  Z98.890       4. Multiple myeloma not having achieved remission (H)  C90.00           Water precautions.   Start Ciprodex 4 drops twice a day for 7 days.   Keep aid out while on otics.   Follow up in 2-3 weeks to ensure resolution. Consider ear culture.     Will continue with observation of her Left TM perforation.      Return to ENT sooner with any drainage or concerns.      Bridgette Ly PA-C  ENT  [facility], [place]          Again, thank you for allowing me to participate in the care of your patient.        Sincerely,        Bridgette Ly PA-C

## 2023-03-30 NOTE — PROGRESS NOTES
Chief Complaint   Patient presents with     Ear Problem     Follow up left TM perforation          Patient returns to ENT for follow-up exam.  Patient was last seen on 8/9/22 for follow up exam.       Today, margarita has been doing well overall. She has used ear drops intermittently. At times, she feels some discomfort.   Denies crusting.      Denies otalgia, otorrhea  Denies worrisome tinnitus  Denies fluctuating hearing loss or tinnitus.   Denies vertigo or facial paraesthesia.    Right tube has been absent and right TM intact without effusion.  Left TM does have small perforation noted.   BTTI placed on 9/20/19 by Dr. Ley in office      Past Medical History:   Diagnosis Date     Arthritis      Cyst of breast      Depressive disorder      Diabetes mellitus, type 2 (H) 1/18/2021     Essential hypertension 10/1/2015     Major depressive disorder, recurrent episode, mild (H) 10/1/2015     Mixed hyperlipidemia 10/1/2015     Multiple myeloma not having achieved remission (H) 6/24/2019     Other specified disorders of bladder 07/09/2012    Irritable Bladder     Seasonal allergies 10/1/2015     Unspecified essential hypertension 03/19/2007     Unspecified sinusitis (chronic) 09/05/2007        Allergies   Allergen Reactions     Lisinopril Cough     Phenylephrine Hcl Other (See Comments)     **Entex  HEADACHE (SEVERE)     Phenylpropanolamine Other (See Comments)     **Entex  HEADACHE (SEVERE)     Pseudoephedrine Tannate Other (See Comments)     **Entex  HEADACHE (SEVERE)     Levofloxacin Rash     **Levaquin     Moxifloxacin Hcl [Avelox] Rash     Current Outpatient Medications   Medication     aspirin (ASA) 81 MG chewable tablet     atorvastatin (LIPITOR) 10 MG tablet     diphenhydrAMINE (BENADRYL) 25 MG capsule     EPINEPHrine (EPIPEN) 0.3 MG/0.3ML injection     fluticasone (FLONASE) 50 MCG/ACT nasal spray     L-Lysine HCl 500 MG CAPS     losartan (COZAAR) 50 MG tablet     sertraline (ZOLOFT) 50 MG tablet     No  "current facility-administered medications for this visit.     ROS- SEE HPI  /74   Pulse 92   Temp 97.3  F (36.3  C) (Tympanic)   Ht 1.6 m (5' 3\")   Wt 73 kg (161 lb)   SpO2 97%   BMI 28.52 kg/m      General - The patient is well nourished and well developed, and appears to have good nutritional status.    Head and Face - Normocephalic and atraumatic, with no gross asymmetry noted of the contour of the facial features.  The facial nerve is intact, with strong symmetric movements.  Eyes - Extraocular movements intact, and the pupils were reactive to light.  Sclera were not icteric or injected, conjunctiva were pink and moist.  Mouth - Examination of the oral cavity shows pink, healthy, moist mucosa.  No lesions or ulceration noted.  The dentition are in good repair.  The tongue is mobile and midline.  Ears - Examination of the ears showed- Right canal clear. Right TM is intact without effusion or perforation. LEFT Ear examined under otologic microscopy. there is dried crusting throughout EAC This was removed with #5 suction, loop and cupped forceps. There is thick mucoid debris along TM. This was suctioned. ME appeared dry.   Left TM is with perforation, central anterior about 15%. ME appears healthy   Eyes - Extraocular movements intact, and the pupils were reactive to light.  Sclera were not icteric or injected, conjunctiva were pink and moist.  Mouth - Examination of the oral cavity showed pink, healthy oral mucosa. No lesions or ulcerations noted.  The tongue was mobile and midline, and the dentition were in good condition.    Throat - The walls of the oropharynx were smooth, pink, moist, symmetric, and had no lesions or ulcerations.  The tonsillar pillars and soft palate were symmetric.  The uvula was midline on elevation.    Neck - Normal midline excursion of the laryngotracheal complex during swallowing.  Full range of motion on passive movement.  Palpation of the occipital, submental, " submandibular, internal jugular chain, and supraclavicular nodes did not demonstrate any abnormal lymph nodes or masses.  Palpation of the thyroid was soft and smooth, with no nodules or goiter appreciated.  The trachea was mobile and midline.     ASSESSMENT/ PLAN:    ICD-10-CM    1. Perforation of tympanic membrane, left  H72.92       2. Otorrhea, left  H92.12       3. H/O myringotomy w/ tube placement  Z98.890       4. Multiple myeloma not having achieved remission (H)  C90.00           Water precautions.   Start Ciprodex 4 drops twice a day for 7 days.   Keep aid out while on otics.   Follow up in 2-3 weeks to ensure resolution. Consider ear culture.     Will continue with observation of her Left TM perforation.      Return to ENT sooner with any drainage or concerns.      Bridgette Ly PA-C  ENT  [facility] [place]

## 2023-03-30 NOTE — PROGRESS NOTES
Audiology Evaluation Completed. Please refer SCANNED AUDIOGRAM and/or TYMPANOGRAM for BACKGROUND, RESULTS, RECOMMENDATIONS.      Bia BULLOCK, [facility]  Audiologist #[id number]

## 2023-03-30 NOTE — PROGRESS NOTES
HEARING AID CHECK    BACKGROUND:  Carmelita Watts, a 72 year old female, was seen today for an hearing aid check.  Ms. Watts wears Binaural Oticon OPN 2 miniRITE R hearing aids.  Ms. Watts reported no concerns.    FINDINGS:  Visual inspection and cleaning provided.   C-stop changed with new. 8mm DV domes changed with new. Battery was tested and good. Good listening check.    Reviewed cleaning, troubleshooting, and preventative care with patient. Any cleaning tools used were provided as customer courtesy.    Services performed and warranty reviewed with patient.    PLAN:  Based on patient report, no audiology appointment for adjustments needed.Ms. Watts will return to clinic in 12 months, sooner if needed. Patient had no further questions and reports satisfaction.         Nila Arteaga  Audiology Assistant  [facility]  [phone]

## 2023-03-31 ENCOUNTER — OFFICE VISIT (OUTPATIENT)
Dept: FAMILY MEDICINE | Facility: OTHER | Age: 73
End: 2023-03-31
Attending: PHYSICIAN ASSISTANT
Payer: COMMERCIAL

## 2023-03-31 VITALS
WEIGHT: 168.4 LBS | DIASTOLIC BLOOD PRESSURE: 80 MMHG | BODY MASS INDEX: 29.84 KG/M2 | HEART RATE: 93 BPM | HEIGHT: 63 IN | RESPIRATION RATE: 16 BRPM | SYSTOLIC BLOOD PRESSURE: 126 MMHG | TEMPERATURE: 98.1 F | OXYGEN SATURATION: 98 %

## 2023-03-31 DIAGNOSIS — K64.4 EXTERNAL HEMORRHOIDS: Primary | ICD-10-CM

## 2023-03-31 PROCEDURE — G0463 HOSPITAL OUTPT CLINIC VISIT: HCPCS

## 2023-03-31 PROCEDURE — 99213 OFFICE O/P EST LOW 20 MIN: CPT | Performed by: PHYSICIAN ASSISTANT

## 2023-03-31 RX ORDER — HYDROCORTISONE 25 MG/G
CREAM TOPICAL 2 TIMES DAILY PRN
Qty: 60 G | Refills: 1 | Status: SHIPPED | OUTPATIENT
Start: 2023-03-31 | End: 2023-08-23

## 2023-03-31 ASSESSMENT — PAIN SCALES - GENERAL: PAINLEVEL: NO PAIN (0)

## 2023-03-31 NOTE — TELEPHONE ENCOUNTER
Patient is scheduled today.  Next 5 appointments (look out 90 days)    Mar 31, 2023  1:30 PM  (Arrive by 1:15 PM)  SHORT with VENKATESH Stevens  [facility] ([facility] ) [address] [address]  [phone]   Apr 04, 2023 10:50 AM  (Arrive by 10:35 AM)  Return Visit with FLO [facility] ([facility] ) [address] [address]  [phone]   Apr 10, 2023  2:45 PM  (Arrive by 2:30 PM)  SHORT with Barbie Servin MD  [facility] ([facility] ) [address] [address]  [phone]

## 2023-03-31 NOTE — PROGRESS NOTES
"  Assessment & Plan     External hemorrhoids  Has a hemorrhoid at 12 o'clock.  Not thrombosed. It feels very large to her.  Is going to try Anusol and then soak in a tub.  She really wants the area removed or banded.  I told her only the surgeon knows where this will originate from and if it can be done.   - Adult General Surg Referral  - hydrocortisone, Perianal, (HYDROCORTISONE) 2.5 % cream; Place rectally 2 times daily as needed for hemorrhoids    Ordering of each unique test  Prescription drug management  10  minutes spent by me on the date of the encounter doing chart review, history and exam, documentation and further activities per the note       See Patient Instructions    No follow-ups on file.    VENKATESH Trevino  [facility] [place] is a 72 year old, presenting for the following health issues:  Rectal Problem    Additional Questions 3/31/2023   Roomed by eulogio   Accompanied by self     HPI     Hemorrhoids  Onset/Duration: long time  Description:   [place]-anal lump: No  Pain: YES  Itching: No  Accompanying Signs & Symptoms:  Blood in stool: No  Changes in stool pattern: No  History:   Any previous GI studies done:Colonoscopy, Abdominal x-rays and CT scan of the abdomen  Family History of colon cancer: No  Precipitating factors:   None  Alleviating factors:  Preperation H- helps  Therapies tried and outcome: preparation H          Review of Systems   Constitutional, HEENT, cardiovascular, pulmonary, gi and gu systems are negative, except as otherwise noted.      Objective    /80 (BP Location: Right arm, Patient Position: Sitting, Cuff Size: Adult Regular)   Pulse 93   Temp 98.1  F (36.7  C) (Tympanic)   Resp 16   Ht 1.6 m (5' 3\")   Wt 76.4 kg (168 lb 6.4 oz)   SpO2 98%   BMI 29.83 kg/m    Body mass index is 29.83 kg/m .  Physical Exam   GENERAL: healthy, alert and no distress  ABDOMEN: soft, nontender, no hepatosplenomegaly, no masses and bowel sounds " normal   (female): external hemorrhoid at 12 o'clock , mild irritation and no redness or Thrombosis noted.  She has limited amount of discomfort mild itching. Discussion on origin and how to treat has been worsening since her Myeloma treatment.   MS: no gross musculoskeletal defects noted, no edema

## 2023-04-04 ENCOUNTER — ONCOLOGY VISIT (OUTPATIENT)
Dept: ONCOLOGY | Facility: OTHER | Age: 73
End: 2023-04-04
Attending: NURSE PRACTITIONER
Payer: COMMERCIAL

## 2023-04-04 VITALS
DIASTOLIC BLOOD PRESSURE: 74 MMHG | RESPIRATION RATE: 19 BRPM | TEMPERATURE: 98.5 F | HEART RATE: 86 BPM | HEIGHT: 63 IN | BODY MASS INDEX: 29.53 KG/M2 | SYSTOLIC BLOOD PRESSURE: 128 MMHG | WEIGHT: 166.67 LBS | OXYGEN SATURATION: 98 %

## 2023-04-04 DIAGNOSIS — C90.00 MULTIPLE MYELOMA NOT HAVING ACHIEVED REMISSION (H): Primary | ICD-10-CM

## 2023-04-04 DIAGNOSIS — M89.9 BONE LESION: ICD-10-CM

## 2023-04-04 DIAGNOSIS — R74.8 ELEVATED ALKALINE PHOSPHATASE LEVEL: ICD-10-CM

## 2023-04-04 PROCEDURE — 99215 OFFICE O/P EST HI 40 MIN: CPT | Performed by: NURSE PRACTITIONER

## 2023-04-04 PROCEDURE — G0463 HOSPITAL OUTPT CLINIC VISIT: HCPCS

## 2023-04-04 ASSESSMENT — PAIN SCALES - GENERAL: PAINLEVEL: NO PAIN (0)

## 2023-04-04 NOTE — PATIENT INSTRUCTIONS
Thank you for allowing me to be a part of your care today.    I would like to see you back 6 weeks with labs completed one week prior, sooner with concerns. You can see these appts on [employer] but we will mail your AVS.     If you have any questions please call [phone]    Other instructions:      None

## 2023-04-04 NOTE — NURSING NOTE
"Oncology Rooming Note    April 4, 2023 10:41 AM   Carmelita Watts is a 72 year old female who presents for:    Chief Complaint   Patient presents with     Oncology Clinic Visit     Follow up. Multiple myeloma not having achieved remission      Initial Vitals: /74   Pulse 86   Temp 98.5  F (36.9  C) (Tympanic)   Resp 19   Ht 1.6 m (5' 3\")   Wt 75.6 kg (166 lb 10.7 oz)   SpO2 98%   BMI 29.52 kg/m   Estimated body mass index is 29.52 kg/m  as calculated from the following:    Height as of this encounter: 1.6 m (5' 3\").    Weight as of this encounter: 75.6 kg (166 lb 10.7 oz). Body surface area is 1.83 meters squared.  No Pain (0) Comment: Data Unavailable   No LMP recorded. Patient is postmenopausal.  Allergies reviewed: Yes  Medications reviewed: Yes    Medications: Medication refills not needed today.  Pharmacy name entered into [facility]:    [id number] - [place] - [address] AT [facility] [address]  [facility] - [place], [place] - [address]  [facility] - [place] - [address]    Clinical concerns: none       Julia Andrade LPN              "

## 2023-04-06 ENCOUNTER — OFFICE VISIT (OUTPATIENT)
Dept: SURGERY | Facility: OTHER | Age: 73
End: 2023-04-06
Attending: CLINICAL NURSE SPECIALIST
Payer: COMMERCIAL

## 2023-04-06 VITALS
BODY MASS INDEX: 29.23 KG/M2 | TEMPERATURE: 98.9 F | WEIGHT: 165 LBS | SYSTOLIC BLOOD PRESSURE: 134 MMHG | HEIGHT: 63 IN | HEART RATE: 89 BPM | DIASTOLIC BLOOD PRESSURE: 84 MMHG | OXYGEN SATURATION: 95 %

## 2023-04-06 DIAGNOSIS — K64.4 EXTERNAL HEMORRHOIDS: ICD-10-CM

## 2023-04-06 PROCEDURE — G0463 HOSPITAL OUTPT CLINIC VISIT: HCPCS

## 2023-04-06 PROCEDURE — 99213 OFFICE O/P EST LOW 20 MIN: CPT | Performed by: CLINICAL NURSE SPECIALIST

## 2023-04-06 ASSESSMENT — PAIN SCALES - GENERAL: PAINLEVEL: NO PAIN (0)

## 2023-04-06 NOTE — PATIENT INSTRUCTIONS
Thank you for allowing Alfred Frazier and our team to participate in your care. Please call our office at [phone] with scheduling questions or with any other questions or concerns.

## 2023-04-07 NOTE — PROGRESS NOTES
Surgery Consult Clinic Note      RE: Carmelita Watts  : 1950  HANK: 2023      Chief Complaint:  External hemorrhoid    History of Present Illness:  I am seeing Carmelita Watts at the request of Kenyatta RIDDLE for evaluation regarding external hemorrhoid.  She denies pain, itching, or bleeding with the hemorrhoid.  She reports it has been present for many years and when it does become bothersome she applies hemorrhoid cream or hydrocortisone cream.   She denies family history of colon or rectal cancer, blood in stool, changes in bowel habits, weight loss, abdominal pain.  Previous abdominal surgeries include cholecystectomy and appendectomy.  She specifically denies fevers, chills, nausea, vomiting, chest pain, shortness of breath, palpitations, sore throat, cough, or generalized feeling ill.      Medical history:  Past Medical History:   Diagnosis Date     Arthritis      Cyst of breast      Depressive disorder      Diabetes mellitus, type 2 (H) 2021     Essential hypertension 10/1/2015     Major depressive disorder, recurrent episode, mild (H) 10/1/2015     Mixed hyperlipidemia 10/1/2015     Multiple myeloma not having achieved remission (H) 2019     Other specified disorders of bladder 2012    Irritable Bladder     Seasonal allergies 10/1/2015     Unspecified essential hypertension 2007     Unspecified sinusitis (chronic) 2007       Surgical history:  Past Surgical History:   Procedure Laterality Date     APPENDECTOMY       BONE MARROW BIOPSY, BONE SPECIMEN, NEEDLE/TROCAR N/A 2019    Procedure: BONE MARROW BIOPSY;  Surgeon: Maciej Sanz MD;  Location: [facility] OR     CHOLECYSTECTOMY       COLONOSCOPY  2006    repeat 10 years     COLONOSCOPY N/A 2016    Procedure: COLONOSCOPY;  Surgeon: Bhaskar Franklin DO;  Location: [facility] OR     PUNC/ASPIR BREAST CYST Left     benign     SINUS SURGERY       TUBAL STERILIZATION         Family history:  Family History  "  Problem Relation Age of Onset     Breast Cancer Mother 60        Cause of Death     Parkinsonism Father         \"Possible\"     Coronary Artery Disease Father      Pacemaker Father      Thyroid Disease Daughter      Breast Cancer Maternal Aunt         over 50     Diabetes No family hx of      Hypertension No family hx of      Hyperlipidemia No family hx of      Cerebrovascular Disease No family hx of      Colon Cancer No family hx of      Prostate Cancer No family hx of      Genetic Disorder No family hx of      Asthma No family hx of      Anesthesia Reaction No family hx of        Medications:  Prior to Admission medications    Medication Sig Start Date End Date Taking? Authorizing Provider   aspirin (ASA) 81 MG chewable tablet Take 81 mg by mouth daily   Yes Reported, Patient   atorvastatin (LIPITOR) 10 MG tablet Take 1 tablet (10 mg) by mouth daily 11/16/22  Yes Kenyatta Trujillo PA   diphenhydrAMINE (BENADRYL) 25 MG capsule Take 25 mg by mouth daily   Yes Reported, Patient   EPINEPHrine (EPIPEN) 0.3 MG/0.3ML injection Inject 0.3 mg into the muscle once as needed Use as needed for anaphylaxis.   Yes Reported, Patient   fluticasone (FLONASE) 50 MCG/ACT nasal spray SPRAY 2 SPRAYS INTO BOTH NOSTRILS DAILY 10/18/16  Yes Bridgette Ly PA-C   hydrocortisone, Perianal, (HYDROCORTISONE) 2.5 % cream Place rectally 2 times daily as needed for hemorrhoids 3/31/23  Yes Kenyatta Trujillo PA   L-Lysine HCl 500 MG CAPS Take by mouth daily   Yes Reported, Patient   losartan (COZAAR) 50 MG tablet Take 1 tablet (50 mg) by mouth daily 11/16/22  Yes Kenyatta Trujillo PA   sertraline (ZOLOFT) 50 MG tablet Take 0.5 tablets (25 mg) by mouth daily 11/16/22  Yes Kenyatta Trujillo PA   cyclophosphamide 50 MG PO capsule Take 3 capsules (150 mg) by mouth daily On days 1, 8, 15, 22 2/18/20 11/24/20  Barbie Quintana, NP       Allergies:  The patient is allergic to lisinopril, phenylephrine hcl, phenylpropanolamine, pseudoephedrine " "tannate, levofloxacin, and moxifloxacin hcl [avelox].  .  Social history:  Social History     Tobacco Use     Smoking status: Never     Smokeless tobacco: Never     Tobacco comments:     Tried to Quit (YES); QUIT in 1971; Passive Exposure (NO)   Vaping Use     Vaping status: Never Used     Passive vaping exposure: Yes   Substance Use Topics     Alcohol use: Yes     Comment: RARELY     Marital status: .    Review of Systems:  Constitutional, HEENT, cardiovascular, pulmonary, gi and gu systems are negative, except as otherwise noted.    Physical Examination:  /84   Pulse 89   Temp 98.9  F (37.2  C) (Tympanic)   Ht 1.6 m (5' 3\")   Wt 74.8 kg (165 lb)   SpO2 95%   BMI 29.23 kg/m    General: Alert and orientedx4, no acute distress, well-developed/well-nourished, ambulating without assistance  HEENT: normocephalic atraumatic, extraocular movements intact, sclerae anicteric, Trachea mideline  Chest:   Respirations even and unlabored  Abdomen: Right anterior hemorrhoid, no signs symptoms of thrombosis or bleeding  Extremities: Cursory exam unremarkable.    Skin: Warm, dry, less than 2 sec cap refill  Neuro: CN 2-12 grossly intact, no focal deficit, GCS 15  Psych: Pleasant, calm, asks appropriate questions      Assessment/Plan:  #1 External hemorrhoid    We had a blanka discussion regarding hemorrhoidectomy and the discomfort associated with it.  I was clear that we recommend hemorrhoid removal if it is interfering with quality of life.       At this time, Carmelita does not feel this hemorrhoid is interfering with her activities and would like to delay hemorrhoidectomy until a time when it is more bothersome.  This plan was discussed with Dr. Newberry and he is in agreement.   Karin Simon [facility]  [address] [place]    [address]    Referring Provider:  VENKATESH Stevens  [address] [address]     Primary Care " Provider:  Kenyatta Trujillo

## 2023-04-19 ENCOUNTER — OFFICE VISIT (OUTPATIENT)
Dept: OTOLARYNGOLOGY | Facility: OTHER | Age: 73
End: 2023-04-19
Attending: AUDIOLOGIST
Payer: COMMERCIAL

## 2023-04-19 VITALS
BODY MASS INDEX: 28.7 KG/M2 | WEIGHT: 162 LBS | OXYGEN SATURATION: 99 % | TEMPERATURE: 97.2 F | HEIGHT: 63 IN | DIASTOLIC BLOOD PRESSURE: 80 MMHG | HEART RATE: 61 BPM | SYSTOLIC BLOOD PRESSURE: 138 MMHG

## 2023-04-19 DIAGNOSIS — H72.92 PERFORATION OF TYMPANIC MEMBRANE, LEFT: Primary | ICD-10-CM

## 2023-04-19 DIAGNOSIS — Z98.890 H/O MYRINGOTOMY: ICD-10-CM

## 2023-04-19 DIAGNOSIS — C90.00 MULTIPLE MYELOMA NOT HAVING ACHIEVED REMISSION (H): ICD-10-CM

## 2023-04-19 PROCEDURE — G0463 HOSPITAL OUTPT CLINIC VISIT: HCPCS

## 2023-04-19 PROCEDURE — 92504 EAR MICROSCOPY EXAMINATION: CPT | Performed by: PHYSICIAN ASSISTANT

## 2023-04-19 PROCEDURE — 99213 OFFICE O/P EST LOW 20 MIN: CPT | Mod: 25 | Performed by: PHYSICIAN ASSISTANT

## 2023-04-19 ASSESSMENT — PAIN SCALES - GENERAL: PAINLEVEL: NO PAIN (0)

## 2023-04-19 NOTE — PROGRESS NOTES
Chief Complaint   Patient presents with     Ear Problem     Follow up left TM perforation and left otorrhea     Patient returns to ENT for follow-up exam.  Patient was last seen on 3/30/23 and had canal cleaned and started on otics. She was noted to have otorrhea at base of perforation.         Today, Carmelita has been doing well overall. She has used drops as directed since her last visit.      Denies otalgia, otorrhea  Denies worrisome tinnitus  Denies fluctuating hearing loss or tinnitus.   Denies vertigo or facial paraesthesia.    Right tube has been absent and right TM intact without effusion.  Left TM does have small perforation noted.   BTTI placed on 9/20/19 by Dr. Ley in office       Past Medical History:   Diagnosis Date     Arthritis      Cyst of breast      Depressive disorder      Diabetes mellitus, type 2 (H) 1/18/2021     Essential hypertension 10/1/2015     Major depressive disorder, recurrent episode, mild (H) 10/1/2015     Mixed hyperlipidemia 10/1/2015     Multiple myeloma not having achieved remission (H) 6/24/2019     Other specified disorders of bladder 07/09/2012    Irritable Bladder     Seasonal allergies 10/1/2015     Unspecified essential hypertension 03/19/2007     Unspecified sinusitis (chronic) 09/05/2007        Allergies   Allergen Reactions     Lisinopril Cough     Phenylephrine Hcl Other (See Comments)     **Entex  HEADACHE (SEVERE)     Phenylpropanolamine Other (See Comments)     **Entex  HEADACHE (SEVERE)     Pseudoephedrine Tannate Other (See Comments)     **Entex  HEADACHE (SEVERE)     Levofloxacin Rash     **Levaquin     Moxifloxacin Hcl [Avelox] Rash     Current Outpatient Medications   Medication     aspirin (ASA) 81 MG chewable tablet     atorvastatin (LIPITOR) 10 MG tablet     diphenhydrAMINE (BENADRYL) 25 MG capsule     EPINEPHrine (EPIPEN) 0.3 MG/0.3ML injection     fluticasone (FLONASE) 50 MCG/ACT nasal spray     hydrocortisone, Perianal, (HYDROCORTISONE) 2.5 %  "cream     L-Lysine HCl 500 MG CAPS     losartan (COZAAR) 50 MG tablet     sertraline (ZOLOFT) 50 MG tablet     No current facility-administered medications for this visit.     ROS- SEE HPI  /80 (BP Location: Left arm, Cuff Size: Adult Regular)   Pulse 61   Temp 97.2  F (36.2  C) (Tympanic)   Ht 1.6 m (5' 3\")   Wt 73.5 kg (162 lb)   SpO2 99%   BMI 28.70 kg/m      General - The patient is well nourished and well developed, and appears to have good nutritional status.    Head and Face - Normocephalic and atraumatic, with no gross asymmetry noted of the contour of the facial features.  The facial nerve is intact, with strong symmetric movements.  Eyes - Extraocular movements intact, and the pupils were reactive to light.  Sclera were not icteric or injected, conjunctiva were pink and moist.  Mouth - Examination of the oral cavity shows pink, healthy, moist mucosa.  No lesions or ulceration noted.  The dentition are in good repair.  The tongue is mobile and midline.  Ears - Examination of the ears showed- Right canal clear. Right TM is intact without effusion or perforation. LEFT Ear examined under otologic microscopy.  There is dried debris along inferior floor of canal.  This was removed with cup forceps.  #3 Nguyen was used to clear scant mucoid along base of TM perforation.  Middle ear space was dry.  Left TM is with perforation, central anterior about 15%. ME appears healthy   Eyes - Extraocular movements intact, and the pupils were reactive to light.  Sclera were not icteric or injected, conjunctiva were pink and moist.  Mouth - Examination of the oral cavity showed pink, healthy oral mucosa. No lesions or ulcerations noted.  The tongue was mobile and midline, and the dentition were in good condition.    Throat - The walls of the oropharynx were smooth, pink, moist, symmetric, and had no lesions or ulcerations.  The tonsillar pillars and soft palate were symmetric.  The uvula was midline on elevation. "    Neck - Normal midline excursion of the laryngotracheal complex during swallowing.  Full range of motion on passive movement.  Palpation of the occipital, submental, submandibular, internal jugular chain, and supraclavicular nodes did not demonstrate any abnormal lymph nodes or masses.  Palpation of the thyroid was soft and smooth, with no nodules or goiter appreciated.  The trachea was mobile and midline.     ASSESSMENT/ PLAN:      ICD-10-CM    1. Perforation of tympanic membrane, left  H72.92       2. H/O myringotomy w/ tube placement  Z98.890       3. Multiple myeloma not having achieved remission (H)  C90.00           Complete drops for additional 2 days and made hold.  Ear appears greatly improved since last visit.  Water precautions reinforced.    Return in 6 months for repeat ear exam.  She was recommended if she notes concerns for drainage or ear irritation she is recommended to return to ENT sooner.    Continue with hearing aid use.    Bridgette Ly PA-C  ENT  [facility]

## 2023-04-19 NOTE — PATIENT INSTRUCTIONS
Ear looks improved.   Complete drops for the next 2 days, then hold  Follow up in 6 months  Water precautions for left ear.   Continue w/ hearing aid use      Thank you for allowing Bridgette Ly PA-C and our ENT team to participate in your care.  If your medications are too expensive, please give the nurse a call.  We can possibly change this medication.  If you have a scheduling or an appointment question please contact our Health Unit Coordinator at [phone], Ext. [phone].    ALL nursing questions or concerns can be directed to your ENT nurse at: [phone] Ruby

## 2023-04-19 NOTE — LETTER
4/19/2023         RE: Carmelita Watts  [address] [facility] [address]        Dear Colleague,    Thank you for referring your patient, Carmelita Watts, to the [facility]. Please see a copy of my visit note below.    Chief Complaint   Patient presents with     Ear Problem     Follow up left TM perforation and left otorrhea     Patient returns to ENT for follow-up exam.  Patient was last seen on 3/30/23 and had canal cleaned and started on otics. She was noted to have otorrhea at base of perforation.         Today, Carmelita has been doing well overall. She has used drops as directed since her last visit.      Denies otalgia, otorrhea  Denies worrisome tinnitus  Denies fluctuating hearing loss or tinnitus.   Denies vertigo or facial paraesthesia.    Right tube has been absent and right TM intact without effusion.  Left TM does have small perforation noted.   BTTI placed on 9/20/19 by Dr. Ley in office       Past Medical History:   Diagnosis Date     Arthritis      Cyst of breast      Depressive disorder      Diabetes mellitus, type 2 (H) 1/18/2021     Essential hypertension 10/1/2015     Major depressive disorder, recurrent episode, mild (H) 10/1/2015     Mixed hyperlipidemia 10/1/2015     Multiple myeloma not having achieved remission (H) 6/24/2019     Other specified disorders of bladder 07/09/2012    Irritable Bladder     Seasonal allergies 10/1/2015     Unspecified essential hypertension 03/19/2007     Unspecified sinusitis (chronic) 09/05/2007        Allergies   Allergen Reactions     Lisinopril Cough     Phenylephrine Hcl Other (See Comments)     **Entex  HEADACHE (SEVERE)     Phenylpropanolamine Other (See Comments)     **Entex  HEADACHE (SEVERE)     Pseudoephedrine Tannate Other (See Comments)     **Entex  HEADACHE (SEVERE)     Levofloxacin Rash     **Levaquin     Moxifloxacin Hcl [Avelox] Rash     Current Outpatient Medications   Medication     aspirin (ASA) 81 MG chewable  "tablet     atorvastatin (LIPITOR) 10 MG tablet     diphenhydrAMINE (BENADRYL) 25 MG capsule     EPINEPHrine (EPIPEN) 0.3 MG/0.3ML injection     fluticasone (FLONASE) 50 MCG/ACT nasal spray     hydrocortisone, Perianal, (HYDROCORTISONE) 2.5 % cream     L-Lysine HCl 500 MG CAPS     losartan (COZAAR) 50 MG tablet     sertraline (ZOLOFT) 50 MG tablet     No current facility-administered medications for this visit.     ROS- SEE HPI  /80 (BP Location: Left arm, Cuff Size: Adult Regular)   Pulse 61   Temp 97.2  F (36.2  C) (Tympanic)   Ht 1.6 m (5' 3\")   Wt 73.5 kg (162 lb)   SpO2 99%   BMI 28.70 kg/m      General - The patient is well nourished and well developed, and appears to have good nutritional status.    Head and Face - Normocephalic and atraumatic, with no gross asymmetry noted of the contour of the facial features.  The facial nerve is intact, with strong symmetric movements.  Eyes - Extraocular movements intact, and the pupils were reactive to light.  Sclera were not icteric or injected, conjunctiva were pink and moist.  Mouth - Examination of the oral cavity shows pink, healthy, moist mucosa.  No lesions or ulceration noted.  The dentition are in good repair.  The tongue is mobile and midline.  Ears - Examination of the ears showed- Right canal clear. Right TM is intact without effusion or perforation. LEFT Ear examined under otologic microscopy.  There is dried debris along inferior floor of canal.  This was removed with cup forceps.  #3 Nguyen was used to clear scant mucoid along base of TM perforation.  Middle ear space was dry.  Left TM is with perforation, central anterior about 15%. ME appears healthy   Eyes - Extraocular movements intact, and the pupils were reactive to light.  Sclera were not icteric or injected, conjunctiva were pink and moist.  Mouth - Examination of the oral cavity showed pink, healthy oral mucosa. No lesions or ulcerations noted.  The tongue was mobile and midline, and " the dentition were in good condition.    Throat - The walls of the oropharynx were smooth, pink, moist, symmetric, and had no lesions or ulcerations.  The tonsillar pillars and soft palate were symmetric.  The uvula was midline on elevation.    Neck - Normal midline excursion of the laryngotracheal complex during swallowing.  Full range of motion on passive movement.  Palpation of the occipital, submental, submandibular, internal jugular chain, and supraclavicular nodes did not demonstrate any abnormal lymph nodes or masses.  Palpation of the thyroid was soft and smooth, with no nodules or goiter appreciated.  The trachea was mobile and midline.     ASSESSMENT/ PLAN:      ICD-10-CM    1. Perforation of tympanic membrane, left  H72.92       2. H/O myringotomy w/ tube placement  Z98.890       3. Multiple myeloma not having achieved remission (H)  C90.00           Complete drops for additional 2 days and made hold.  Ear appears greatly improved since last visit.  Water precautions reinforced.    Return in 6 months for repeat ear exam.  She was recommended if she notes concerns for drainage or ear irritation she is recommended to return to ENT sooner.    Continue with hearing aid use.    Bridgette Ly PA-C  ENT  [facility], [place]            Again, thank you for allowing me to participate in the care of your patient.        Sincerely,        Bridgette Ly PA-C

## 2023-04-20 ENCOUNTER — HOSPITAL ENCOUNTER (OUTPATIENT)
Dept: PHYSICAL THERAPY | Facility: HOSPITAL | Age: 73
Setting detail: THERAPIES SERIES
Discharge: HOME | End: 2023-04-20
Attending: PHYSICIAN ASSISTANT
Payer: MEDICARE

## 2023-04-20 PROCEDURE — 97110 THERAPEUTIC EXERCISES: CPT | Mod: GP

## 2023-04-20 PROCEDURE — 97140 MANUAL THERAPY 1/> REGIONS: CPT | Mod: GP

## 2023-04-26 NOTE — PROGRESS NOTES
[facility]    Outpatient Physical Therapy Discharge Note  Patient: Carmelita Watts  : 1950    Beginning/End Dates of Reporting Period:  22 to 23    Referring Provider: VENKATESH Vázquez     Therapy Diagnosis: Tendinitis of the R Shoulder      Client Self Report: Carmelita arrived to therapy today stating she is doing well. She notes she is very happy to report that she has no pain. She states she was able to paint yesterday for hours and was not restricted at all. She states she is agreeable to work with the PT today.    Goals:  Goal Identifier STG-1   Goal Description Patient will possess full pain-free ROM to allow her to complete her daily functional tasks.   Target Date 23   Date Met      Progress (detail required for progress note): (P) Goal Met     Goal Identifier LTG-2   Goal Description Patient will not have numbness/tingling in her hands to allow her to complete all functional tasks without irritation.   Target Date 23   Date Met      Progress (detail required for progress note): (P) Goal Met     Goal Identifier LTG-1   Goal Description Patient will possess a custom HEP to self manage care and prevent recurrence.   Target Date 23   Date Met      Progress (detail required for progress note): (P) Goal Met     Plan:  Discharge from therapy.    Discharge:    Reason for Discharge: Patient has met all goals.    Discharge Plan: Patient to continue home program.

## 2023-04-29 ENCOUNTER — HEALTH MAINTENANCE LETTER (OUTPATIENT)
Age: 73
End: 2023-04-29

## 2023-05-09 ENCOUNTER — LAB (OUTPATIENT)
Dept: LAB | Facility: OTHER | Age: 73
End: 2023-05-09
Payer: MEDICARE

## 2023-05-09 DIAGNOSIS — C90.00 MULTIPLE MYELOMA NOT HAVING ACHIEVED REMISSION (H): ICD-10-CM

## 2023-05-09 DIAGNOSIS — R74.8 ELEVATED ALKALINE PHOSPHATASE LEVEL: ICD-10-CM

## 2023-05-09 LAB
ALBUMIN SERPL BCG-MCNC: 4.1 G/DL (ref 3.5–5.2)
ALP SERPL-CCNC: 151 U/L (ref 35–104)
ALT SERPL W P-5'-P-CCNC: 19 U/L (ref 10–35)
ANION GAP SERPL CALCULATED.3IONS-SCNC: 8 MMOL/L (ref 7–15)
AST SERPL W P-5'-P-CCNC: 22 U/L (ref 10–35)
BASOPHILS # BLD AUTO: 0 10E3/UL (ref 0–0.2)
BASOPHILS NFR BLD AUTO: 1 %
BILIRUB SERPL-MCNC: 0.5 MG/DL
BUN SERPL-MCNC: 12.7 MG/DL (ref 8–23)
CALCIUM SERPL-MCNC: 9.8 MG/DL (ref 8.8–10.2)
CHLORIDE SERPL-SCNC: 101 MMOL/L (ref 98–107)
CREAT SERPL-MCNC: 0.77 MG/DL (ref 0.51–0.95)
DEPRECATED HCO3 PLAS-SCNC: 26 MMOL/L (ref 22–29)
EOSINOPHIL # BLD AUTO: 0.2 10E3/UL (ref 0–0.7)
EOSINOPHIL NFR BLD AUTO: 3 %
ERYTHROCYTE [DISTWIDTH] IN BLOOD BY AUTOMATED COUNT: 14.2 % (ref 10–15)
GFR SERPL CREATININE-BSD FRML MDRD: 80 ML/MIN/1.73M2
GLUCOSE SERPL-MCNC: 124 MG/DL (ref 70–99)
HCT VFR BLD AUTO: 41.4 % (ref 35–47)
HGB BLD-MCNC: 14.2 G/DL (ref 11.7–15.7)
IMM GRANULOCYTES # BLD: 0 10E3/UL
IMM GRANULOCYTES NFR BLD: 0 %
LYMPHOCYTES # BLD AUTO: 2.1 10E3/UL (ref 0.8–5.3)
LYMPHOCYTES NFR BLD AUTO: 39 %
MCH RBC QN AUTO: 30.1 PG (ref 26.5–33)
MCHC RBC AUTO-ENTMCNC: 34.3 G/DL (ref 31.5–36.5)
MCV RBC AUTO: 88 FL (ref 78–100)
MONOCYTES # BLD AUTO: 0.6 10E3/UL (ref 0–1.3)
MONOCYTES NFR BLD AUTO: 11 %
NEUTROPHILS # BLD AUTO: 2.5 10E3/UL (ref 1.6–8.3)
NEUTROPHILS NFR BLD AUTO: 46 %
NRBC # BLD AUTO: 0 10E3/UL
NRBC BLD AUTO-RTO: 0 /100
PLATELET # BLD AUTO: 238 10E3/UL (ref 150–450)
POTASSIUM SERPL-SCNC: 4.1 MMOL/L (ref 3.4–5.3)
PROT SERPL-MCNC: 7.3 G/DL (ref 6.4–8.3)
RBC # BLD AUTO: 4.71 10E6/UL (ref 3.8–5.2)
SODIUM SERPL-SCNC: 135 MMOL/L (ref 136–145)
WBC # BLD AUTO: 5.4 10E3/UL (ref 4–11)

## 2023-05-09 PROCEDURE — 86334 IMMUNOFIX E-PHORESIS SERUM: CPT | Mod: ZL | Performed by: STUDENT IN AN ORGANIZED HEALTH CARE EDUCATION/TRAINING PROGRAM

## 2023-05-09 PROCEDURE — 84165 PROTEIN E-PHORESIS SERUM: CPT | Mod: 26 | Performed by: PATHOLOGY

## 2023-05-09 PROCEDURE — 84165 PROTEIN E-PHORESIS SERUM: CPT | Mod: ZL | Performed by: STUDENT IN AN ORGANIZED HEALTH CARE EDUCATION/TRAINING PROGRAM

## 2023-05-09 PROCEDURE — 82784 ASSAY IGA/IGD/IGG/IGM EACH: CPT | Mod: ZL

## 2023-05-09 PROCEDURE — 84155 ASSAY OF PROTEIN SERUM: CPT | Mod: ZL

## 2023-05-09 PROCEDURE — 80053 COMPREHEN METABOLIC PANEL: CPT | Mod: ZL

## 2023-05-09 PROCEDURE — 83521 IG LIGHT CHAINS FREE EACH: CPT | Mod: ZL

## 2023-05-09 PROCEDURE — 36415 COLL VENOUS BLD VENIPUNCTURE: CPT | Mod: ZL

## 2023-05-09 PROCEDURE — 86334 IMMUNOFIX E-PHORESIS SERUM: CPT | Mod: 26 | Performed by: PATHOLOGY

## 2023-05-09 PROCEDURE — 82232 ASSAY OF BETA-2 PROTEIN: CPT | Mod: ZL

## 2023-05-09 PROCEDURE — 85810 BLOOD VISCOSITY EXAMINATION: CPT | Mod: ZL

## 2023-05-09 PROCEDURE — 85025 COMPLETE CBC W/AUTO DIFF WBC: CPT | Mod: ZL

## 2023-05-10 LAB
ALBUMIN SERPL ELPH-MCNC: 4.1 G/DL (ref 3.7–5.1)
ALPHA1 GLOB SERPL ELPH-MCNC: 0.3 G/DL (ref 0.2–0.4)
ALPHA2 GLOB SERPL ELPH-MCNC: 0.8 G/DL (ref 0.5–0.9)
B-GLOBULIN SERPL ELPH-MCNC: 0.7 G/DL (ref 0.6–1)
B2 MICROGLOB TUMOR MARKER SER-MCNC: 2.5 MG/L
GAMMA GLOB SERPL ELPH-MCNC: 1.1 G/DL (ref 0.7–1.6)
IGA SERPL-MCNC: 35 MG/DL (ref 84–499)
IGG SERPL-MCNC: 1172 MG/DL (ref 610–1616)
IGM SERPL-MCNC: 62 MG/DL (ref 35–242)
KAPPA LC FREE SER-MCNC: 0.79 MG/DL (ref 0.33–1.94)
KAPPA LC FREE/LAMBDA FREE SER NEPH: 1.76 {RATIO} (ref 0.26–1.65)
LAMBDA LC FREE SERPL-MCNC: 0.45 MG/DL (ref 0.57–2.63)
M PROTEIN SERPL ELPH-MCNC: 0.4 G/DL
PROT PATTERN SERPL ELPH-IMP: ABNORMAL
PROT PATTERN SERPL IFE-IMP: NORMAL
TOTAL PROTEIN SERUM FOR ELP: 6.9 G/DL (ref 6.4–8.3)
VISC SER: 1.7 CP (ref 1.4–1.8)

## 2023-05-10 NOTE — PROGRESS NOTES
Oncology Follow-up Visit    Reason for Visit:  Carmelita is a 72 year old woman with a diagnosis of multiple myeloma, who I am visiting with today for routine follow-up.     Nursing Note and documentation reviewed: Yes    Interval History:   Carlene notes that she is doing well. She is feeling well. No concerns today. Notes that she has been using the [employer] gym multiple times a week and feels this has helped with her energy levels. Doing weekly art classes in [place]. Traveling to [place] for art classes in June. Overall, feeling well.     She denies new signs of infection. No fever, chills, chest pain, cough, or SOB. No bleeding concerns. No nausea or vomiting. Appetite has been very good. Weight up a couple lbs. Bowels regular, adjusts diet to help control these. Energy very good. Mental health very good. No new bony aches or pains.     Oncologic History  12/31/2018 Presented to the emergency room with vertigo and fatigue.  CT scan of the head was negative and subsequent stress test was negative.  05/03/2019 PCP ordered lab work and noted a total protein of 12.9.  SPEP at that time showed an M spike of 6.2 with a large monoclonal protein seen in the gamma fraction. Urine immunofixation showed a possible small protein band in the gamma fraction  05/31/2019 Evaluated by Dr. Walker with Medical Oncology with plan to rule out myeloma and obtain bone marrow aspiration biopsy as well as a metastatic bone survey along with additional labwork  06/18/2019 Underwent bone marrow aspiration and biopsy  06/24/2019 Seen again by Dr. Walker and CBC showed a hemoglobin of 9.3, M spike 7.3 with monoclonal IgG immunoglobulin of kappa light chain type; serum viscosity was 2.9; quantitative immunoglobulins showed an IgG of 8160, beta-2 microglobulin was 5.8, BUN was 21 with creatinine is 0.8 and total protein was 13.7.  Quantitative kappa/lambda free light chains showed an elevated ratio of 17.0 bone marrow aspiration biopsy showed  plasma cell myeloma with approximately 80% plasma cells.  Immunofixation showed IgG kappa and flow cytometry revealed kappa monotypic plasma cells consistent with clonal plasma cell neoplasm and FISH panel was pending at that time. It was felt she had at least stage II disease based on her beta-2 microglobulin and anemia. Plan was to treat with Velcade 1.3 mg per/m2 days 1, 4, 8 and 11/Decadron 40 mg on days 1, 8 and 15. Initiation of Revlimid with C2 at 25 mg daily days 1 through 14.  Plan was to also obtain an MRI of the lumbar spine to rule out lytic lesion at L3.  She was initiated on Zovirax 400 mg p.o. twice daily.  06/25/2019 C1 of chemotherapy initiated  07/01/2019 Note in chart regarding patient's large co-pay for the Revlimid and no plan at this point to initiate Revlimid and treat only with Velcade and Decadron per Dr. Walker  07/11/2019 MRI lumbar spine shows a pathologic superior endplate compression fracture at L3 without evidence of retropulsed fragment and innumerable enhancing lesions throughout the lumbar spine consistent with history of multiple myeloma.  09/10/2019 Increasing M-spike and kappa lambda ratio  10/01/2019 Initiation of CyBorD  11/17/2020 Plateau of M-spike at 1.2 after 36 cycles of CyBorD  12/01/2020 Initiation of Darzalex Faspro injection  03/23/2021 M-spike increased form 1.0 to 1.2 and velcade and dexamethasone added to plan  04/12/2022 Treatment HOLIDAY commenced with Mspike 0.3 after 22 cycles of treatent  05/27/2022 Received Evusheld while on treatment holiday.      Current Chemo Regime/TX: Darzalex faspro injection 1800mg subcutaneous per protocol on Day 1 with velcade 1.3mg/m2 on days 1, 4, 8, 11 with weekly Dexamethasone, given every 21 days      **Zometa 4mg every 3 months    Current Cycle: Treatment holiday     Previous treatment:   Velcade 1.3 mg/m2 days 1, 4, 8 and 11 with Decadron 40 mg days 1, 8 and 15 x 4 cycles;   Velcade 1.5mg.m2/cyclophosphamide 150mg every 7  "days on days 1,8,15 and 22/decadron 40mg days 1,8,15,22 ; Darzalex faspro injection 1800mg subcutaneous per protocol    Past Medical History:   Diagnosis Date     Arthritis      Cyst of breast      Depressive disorder      Diabetes mellitus, type 2 (H) 1/18/2021     Essential hypertension 10/1/2015     Major depressive disorder, recurrent episode, mild (H) 10/1/2015     Mixed hyperlipidemia 10/1/2015     Multiple myeloma not having achieved remission (H) 6/24/2019     Other specified disorders of bladder 07/09/2012    Irritable Bladder     Seasonal allergies 10/1/2015     Unspecified essential hypertension 03/19/2007     Unspecified sinusitis (chronic) 09/05/2007       Past Surgical History:   Procedure Laterality Date     APPENDECTOMY       BONE MARROW BIOPSY, BONE SPECIMEN, NEEDLE/TROCAR N/A 6/18/2019    Procedure: BONE MARROW BIOPSY;  Surgeon: Maciej Sanz MD;  Location: [facility] OR     CHOLECYSTECTOMY       COLONOSCOPY  07-    repeat 10 years     COLONOSCOPY N/A 12/30/2016    Procedure: COLONOSCOPY;  Surgeon: Bhaskar Franklin DO;  Location: [facility] OR     PUNC/ASPIR BREAST CYST Left 1995    benign     SINUS SURGERY       TUBAL STERILIZATION         Family History   Problem Relation Age of Onset     Breast Cancer Mother 60        Cause of Death     Parkinsonism Father         \"Possible\"     Coronary Artery Disease Father      Pacemaker Father      Thyroid Disease Daughter      Breast Cancer Maternal Aunt         over 50     Diabetes No family hx of      Hypertension No family hx of      Hyperlipidemia No family hx of      Cerebrovascular Disease No family hx of      Colon Cancer No family hx of      Prostate Cancer No family hx of      Genetic Disorder No family hx of      Asthma No family hx of      Anesthesia Reaction No family hx of        Social History     Socioeconomic History     Marital status:      Spouse name: Not on file     Number of children: Not on file     Years of education: Not on " file     Highest education level: Not on file   Occupational History     Occupation: Financial     Comment:  - ([employer])   Tobacco Use     Smoking status: Never     Smokeless tobacco: Never     Tobacco comments:     Tried to Quit (YES); QUIT in 1971; Passive Exposure (NO)   Vaping Use     Vaping status: Never Used     Passive vaping exposure: Yes   Substance and Sexual Activity     Alcohol use: Yes     Comment: RARELY     Drug use: No     Sexual activity: Yes     Partners: Male     Birth control/protection: None   Other Topics Concern      Service Not Asked     Blood Transfusions Not Asked     Caffeine Concern Yes     Comment: Coffee >6 cups daily     Occupational Exposure Not Asked     Hobby Hazards Not Asked     Sleep Concern Not Asked     Stress Concern Not Asked     Weight Concern Not Asked     Special Diet Not Asked     Back Care Not Asked     Exercise Not Asked     Bike Helmet Not Asked     Seat Belt Not Asked     Self-Exams Not Asked     Parent/sibling w/ CABG, MI or angioplasty before 65F 55M? No   Social History Narrative     Not on file     Social Determinants of Health     Financial Resource Strain: Not on file   Food Insecurity: Not on file   Transportation Needs: Not on file   Physical Activity: Not on file   Stress: Not on file   Social Connections: Not on file   Intimate Partner Violence: Not on file   Housing Stability: Not on file       Current Outpatient Medications   Medication     aspirin (ASA) 81 MG chewable tablet     atorvastatin (LIPITOR) 10 MG tablet     diphenhydrAMINE (BENADRYL) 25 MG capsule     EPINEPHrine (EPIPEN) 0.3 MG/0.3ML injection     fluticasone (FLONASE) 50 MCG/ACT nasal spray     hydrocortisone, Perianal, (HYDROCORTISONE) 2.5 % cream     L-Lysine HCl 500 MG CAPS     losartan (COZAAR) 50 MG tablet     sertraline (ZOLOFT) 50 MG tablet     No current facility-administered medications for this visit.        Allergies   Allergen Reactions     Lisinopril Cough      "Phenylephrine Hcl Other (See Comments)     **Entex  HEADACHE (SEVERE)     Phenylpropanolamine Other (See Comments)     **Entex  HEADACHE (SEVERE)     Pseudoephedrine Tannate Other (See Comments)     **Entex  HEADACHE (SEVERE)     Levofloxacin Rash     **Levaquin     Moxifloxacin Hcl [Moxifloxacin Hcl In Nacl] Rash       Review Of Systems:  A complete review of systems is negative except for the above mentioned items in the interval history.     Physical Exam:  /60   Pulse 85   Temp 98.7  F (37.1  C) (Tympanic)   Resp 19   Ht 1.6 m (5' 3\")   Wt 75.8 kg (167 lb)   SpO2 96%   BMI 29.58 kg/m    GENERAL APPEARANCE: Healthy, alert and in no acute distress.  HEENT: Eyes appear normal without scleral icterus. Extraocular movements intact.   NECK:   Supple with normal range of motion. No asymmetry or masses.  LYMPHATICS: No palpable cervical, supraclavicular nodes.  RESP: Lungs clear to auscultation bilaterally, respirations regular and easy.  CARDIOVASCULAR: Regular rate and rhythm. Normal S1, S2; no murmur, gallop, or rub.  ABDOMEN: Soft, non-tender, non-distended. Bowel sounds auscultated all 4 quadrants. No palpable organomegaly or masses.  MUSCULOSKELETAL: Extremities without gross deformities noted. No edema of bilateral lower extremities.  SKIN: No suspicious lesions or rashes.  NEURO: Alert and oriented x 3.  Gait steady.  PSYCHIATRIC: Mentation and affect appear normal.  Mood appropriate.    Laboratory:   Component      Latest Ref [facility] 5/9/2023  10:56 AM   WBC      4.0 - 11.0 10e3/uL 5.4    RBC Count      3.80 - 5.20 10e6/uL 4.71    Hemoglobin      11.7 - 15.7 g/dL 14.2    Hematocrit      35.0 - 47.0 % 41.4    MCV      78 - 100 fL 88    MCH      26.5 - 33.0 pg 30.1    MCHC      31.5 - 36.5 g/dL 34.3    RDW      10.0 - 15.0 % 14.2    Platelet Count      150 - 450 10e3/uL 238    % Neutrophils      % 46    % Lymphocytes      % 39    % Monocytes      % 11    % Eosinophils      % 3    % Basophils      % 1  "   % Immature Granulocytes      % 0    NRBCs per 100 WBC      <1 /100 0    Absolute Neutrophils      1.6 - 8.3 10e3/uL 2.5    Absolute Lymphocytes      0.8 - 5.3 10e3/uL 2.1    Absolute Monocytes      0.0 - 1.3 10e3/uL 0.6    Absolute Eosinophils      0.0 - 0.7 10e3/uL 0.2    Absolute Basophils      0.0 - 0.2 10e3/uL 0.0    Absolute Immature Granulocytes      <=0.4 10e3/uL 0.0    Absolute NRBCs      10e3/uL 0.0    Sodium      136 - 145 mmol/L 135 (L)    Potassium      3.4 - 5.3 mmol/L 4.1    Chloride      98 - 107 mmol/L 101    Carbon Dioxide (CO2)      22 - 29 mmol/L 26    Anion Gap      7 - 15 mmol/L 8    Urea Nitrogen      8.0 - 23.0 mg/dL 12.7    Creatinine      0.51 - 0.95 mg/dL 0.77    Calcium      8.8 - 10.2 mg/dL 9.8    Glucose      70 - 99 mg/dL 124 (H)    Alkaline Phosphatase      35 - 104 U/L 151 (H)    AST      10 - 35 U/L 22    ALT      10 - 35 U/L 19    Protein Total      6.4 - 8.3 g/dL 7.3    Albumin      3.5 - 5.2 g/dL 4.1    Bilirubin Total      <=1.2 mg/dL 0.5    GFR Estimate      >60 mL/min/1.73m2 80    Albumin Fraction      3.7 - 5.1 g/dL 4.1    Alpha 1 Fraction      0.2 - 0.4 g/dL 0.3    Alpha 2 Fraction      0.5 - 0.9 g/dL 0.8    Beta Fraction      0.6 - 1.0 g/dL 0.7    Gamma Fraction      0.7 - 1.6 g/dL 1.1    Monoclonal Peak      <=0.0 g/dL 0.4 (H)    ELP Interpretation: Monoclonal protein (0.4 g/dL) seen in the gamma fraction. See immunofixation report on same specimen. Pathologic significance requires clinical correlation. Clair Lyons M.D., Ph.D.    IGG      610 - 1,616 mg/dL 1,172    IGA      84 - 499 mg/dL 35 (L)    IGM      35 - 242 mg/dL 62    [place] Free Lt Chain      0.33 - 1.94 mg/dL 0.79    Lambda Free Lt Chain      0.57 - 2.63 mg/dL 0.45 (L)    Kappa Lambda Ratio      0.26 - 1.65  1.76 (H)    Total Protein Serum for ELP      6.4 - 8.3 g/dL 6.9    Immunofixation ELP Monoclonal IgG immunoglobulin of kappa light chain type. Monoclonal antibody therapeutics (e.g. Daratumumab) may appear  as monoclonal proteins on serum electrophoresis and immunofixation. Results should be interpreted with caution if the patient is receiving monoclonal antibody therapy. Pathologic significance requires clinical correlation. Clair Lyons M.D., Ph.D.    Beta-2-Microglobulin      <2.3 mg/L 2.5 (H)    Viscosity Index      1.4 - 1.8  1.7       Legend:  (L) Low  (H) High      Imaging Studies:  None this visit.     ASSESSMENT/PLAN:  #1 Multiple myeloma IgG kappa multiple myeloma with 80% involvement of the bone marrow diagnosed June 2019 initially treated with Velcade and Decadron and received 4 cycles with increasing M-spike and kappa/lambda ratio.  Treatment changed to CyBorD on 10/1/2019.  M-spike plateau at 1.2 and treatment changed to monotherapy with Darzalex Faspro injection. M spike declined to 1.0 then increased again to 1.2 so Velcade and Dex added to her treatment plan with cycle 5 therapy. Following C22 (April 2022), she did initiate a treatment holiday when her Mspike was 0.3.     Carlene has been doing well. She denies any new concerns today. Staying active and feeling well. Labs indicate a stable mspike of 0.4. Kappa Lambda ratio up a bit but stable for most part. IgG stable. No evidence or end organ damage. Appropriate to continue surveillance at this time. I will see her back in about 8 weeks with labs completed week prior.     #2 Lytic lesions She remains on calcium with vitamin D twice a day. Weight bearing activity. Will resume zometa with treatment. Currently on hold.    #3 Elevated Alk Phos Isoenzymes show that this is likely related to her liver. Ultrasound of liver unremarkable. Patient on lipitor. Patient sees PCP tomorrow.     Patient in agreement with plan and verbalizes understanding. Agrees to call with any questions or concerns.    40 minutes spent in the patient's encounter today with time spent in review of patient's chart along with chart preparation and review of the treatment plan and signing  of treatment plan.  Time was also spent with the patient in obtaining a review of systems and performing a physical exam along with detailed review of all test results. Time was also spent in discussing plan for future follow-up and relating instructions for follow-up and in placing future orders.    FLO Duque, FNP-C

## 2023-05-16 ENCOUNTER — ONCOLOGY VISIT (OUTPATIENT)
Dept: ONCOLOGY | Facility: OTHER | Age: 73
End: 2023-05-16
Attending: NURSE PRACTITIONER
Payer: COMMERCIAL

## 2023-05-16 VITALS
TEMPERATURE: 98.7 F | RESPIRATION RATE: 19 BRPM | OXYGEN SATURATION: 96 % | HEIGHT: 63 IN | SYSTOLIC BLOOD PRESSURE: 122 MMHG | WEIGHT: 167 LBS | DIASTOLIC BLOOD PRESSURE: 60 MMHG | HEART RATE: 85 BPM | BODY MASS INDEX: 29.59 KG/M2

## 2023-05-16 DIAGNOSIS — R74.8 ELEVATED ALKALINE PHOSPHATASE LEVEL: ICD-10-CM

## 2023-05-16 DIAGNOSIS — C90.00 MULTIPLE MYELOMA NOT HAVING ACHIEVED REMISSION (H): Primary | ICD-10-CM

## 2023-05-16 DIAGNOSIS — M89.9 BONE LESION: ICD-10-CM

## 2023-05-16 PROCEDURE — G0463 HOSPITAL OUTPT CLINIC VISIT: HCPCS

## 2023-05-16 PROCEDURE — 99215 OFFICE O/P EST HI 40 MIN: CPT | Performed by: NURSE PRACTITIONER

## 2023-05-16 ASSESSMENT — PAIN SCALES - GENERAL: PAINLEVEL: NO PAIN (0)

## 2023-05-16 NOTE — PATIENT INSTRUCTIONS
Thank you for allowing me to be a part of your care today.    I would like to see you back in 2 months with labs completed 1 week prior, sooner with any concerns!!!!    If you have any questions please call [phone]    Other instructions:      None

## 2023-05-16 NOTE — NURSING NOTE
"Oncology Rooming Note    May 16, 2023 10:42 AM   Carmelita Watts is a 72 year old female who presents for:    Chief Complaint   Patient presents with     Oncology Clinic Visit     Follow up. Multiple myeloma not having achieved remission      Initial Vitals: /60   Pulse 85   Temp 98.7  F (37.1  C) (Tympanic)   Resp 19   Ht 1.6 m (5' 3\")   Wt 75.8 kg (167 lb)   SpO2 96%   BMI 29.58 kg/m   Estimated body mass index is 29.58 kg/m  as calculated from the following:    Height as of this encounter: 1.6 m (5' 3\").    Weight as of this encounter: 75.8 kg (167 lb). Body surface area is 1.84 meters squared.  No Pain (0) Comment: Data Unavailable   No LMP recorded. Patient is postmenopausal.  Allergies reviewed: Yes  Medications reviewed: Yes    Medications: Medication refills not needed today.  Pharmacy name entered into [facility]:    [id number] - [place] - [address]  AT [facility] [address]  [facility] - [place], [place] - [address] [facility] [place] [facility] [address]    Clinical concerns: none       Julia Andrade LPN              "

## 2023-05-17 ENCOUNTER — OFFICE VISIT (OUTPATIENT)
Dept: FAMILY MEDICINE | Facility: OTHER | Age: 73
End: 2023-05-17
Attending: PHYSICIAN ASSISTANT
Payer: COMMERCIAL

## 2023-05-17 VITALS
OXYGEN SATURATION: 95 % | DIASTOLIC BLOOD PRESSURE: 87 MMHG | WEIGHT: 167 LBS | SYSTOLIC BLOOD PRESSURE: 128 MMHG | TEMPERATURE: 97.3 F | HEART RATE: 76 BPM | HEIGHT: 63 IN | BODY MASS INDEX: 29.59 KG/M2

## 2023-05-17 DIAGNOSIS — E11.9 TYPE 2 DIABETES MELLITUS WITHOUT COMPLICATION, WITHOUT LONG-TERM CURRENT USE OF INSULIN (H): Primary | ICD-10-CM

## 2023-05-17 DIAGNOSIS — F33.0 MAJOR DEPRESSIVE DISORDER, RECURRENT EPISODE, MILD (H): ICD-10-CM

## 2023-05-17 DIAGNOSIS — C90.00 MULTIPLE MYELOMA NOT HAVING ACHIEVED REMISSION (H): ICD-10-CM

## 2023-05-17 DIAGNOSIS — E78.2 MIXED HYPERLIPIDEMIA: ICD-10-CM

## 2023-05-17 PROCEDURE — 99214 OFFICE O/P EST MOD 30 MIN: CPT | Performed by: PHYSICIAN ASSISTANT

## 2023-05-17 PROCEDURE — G0463 HOSPITAL OUTPT CLINIC VISIT: HCPCS

## 2023-05-17 ASSESSMENT — PAIN SCALES - GENERAL: PAINLEVEL: NO PAIN (0)

## 2023-05-17 ASSESSMENT — PATIENT HEALTH QUESTIONNAIRE - PHQ9: SUM OF ALL RESPONSES TO PHQ QUESTIONS 1-9: 1

## 2023-05-17 NOTE — PROGRESS NOTES
"  Assessment & Plan     Type 2 diabetes mellitus without complication, without long-term current use of insulin (H)  She is due for labs. No low sugars .  Her eye exam is up to date. Her myeloma is active. Taking a baby ASA once a day.   Also has liked to eat.      - Hemoglobin A1c; Future    Mixed hyperlipidemia  Taking 10 mg of Lipitor and this is tolerated. Has a 1 cm liver cyst. Not a concerning lesion.   - TSH with free T4 reflex; Future  - Lipid Profile (Chol, Trig, HDL, LDL calc); Future          3. Major depressive disorder, recurrent episode, mild (H)  Her mood is very good. Out and painting and going to other workshops.   - sertraline (ZOLOFT) 50 MG tablet; Take 0.5 tablets (25 mg) by mouth daily  Dispense: 30 tablet; Refill: 2    5. Multiple myeloma not having achieved remission (H)  Is off Chemo she is going to be given refill of this. She feels nargis in life and is doing well. Exercise was discussed worried about falling.   Options are reviewed.   - sertraline (ZOLOFT) 50 MG tablet; Take 0.5 tablets (25 mg) by mouth daily  Dispense: 30 tablet; Refill: 2    Review of external notes as documented elsewhere in note  Ordering of each unique test  Prescription drug management  10  minutes spent by me on the date of the encounter doing chart review, history and exam, documentation and further activities per the note       BMI:   Estimated body mass index is 29.58 kg/m  as calculated from the following:    Height as of this encounter: 1.6 m (5' 3\").    Weight as of this encounter: 75.8 kg (167 lb).   Weight management plan: Discussed healthy diet and exercise guidelines    See Patient Instructions    No follow-ups on file.    VENKATESH Trevino  [facility] - RALEIGHNAHEED Mae is a 72 year old, presenting for the following health issues:  Follow Up        3/31/2023     1:22 PM   Additional Questions   Roomed by eulogio   Accompanied by self     HPI     Diabetes Follow-up      How " often are you checking your blood sugar? Not at all    What concerns do you have today about your diabetes? None     Do you have any of these symptoms? (Select all that apply)  No numbness or tingling in feet.  No redness, sores or blisters on feet.  No complaints of excessive thirst.  No reports of blurry vision.  No significant changes to weight.    Have you had a diabetic eye exam in the last 12 months? Yes- Date of last eye exam: unknown ,  Location: [facility]         BP Readings from Last 2 Encounters:   05/17/23 128/87   05/16/23 122/60     Hemoglobin A1C (%)   Date Value   11/16/2022 6.0 (H)   05/18/2022 6.0 (H)   04/27/2021 6.4 (H)   01/26/2021 6.3 (H)     LDL Cholesterol Calculated (mg/dL)   Date Value   05/18/2022 105 (H)   08/17/2021 166 (H)   04/27/2021 137 (H)   02/14/2020 125 (H)         Hyperlipidemia Follow-Up      Are you regularly taking any medication or supplement to lower your cholesterol?   Yes- lipitor     Are you having muscle aches or other side effects that you think could be caused by your cholesterol lowering medication?  No    Hypertension Follow-up      Do you check your blood pressure regularly outside of the clinic? No     Are you following a low salt diet? No    Are your blood pressures ever more than 140 on the top number (systolic) OR more   than 90 on the bottom number (diastolic), for example 140/90? No    Depression Followup    How are you doing with your depression since your last visit? No change    Are you having other symptoms that might be associated with depression? No    Have you had a significant life event?  No     Are you feeling anxious or having panic attacks?   No- sometimes can get anxious     Do you have any concerns with your use of alcohol or other drugs? No    Social History     Tobacco Use     Smoking status: Never     Smokeless tobacco: Never     Tobacco comments:     Tried to Quit (YES); QUIT in 1971; Passive Exposure (NO)   Vaping Use     Vaping  "status: Never Used     Passive vaping exposure: Yes   Substance Use Topics     Alcohol use: Yes     Comment: RARELY     Drug use: No         2/17/2022    10:00 AM 11/16/2022     9:32 AM 5/17/2023     8:04 AM   PHQ   PHQ-9 Total Score 0 2 1   Q9: Thoughts of better off dead/self-harm past 2 weeks Not at all Not at all Not at all         8/16/2021     8:00 AM 11/17/2021     9:00 AM 2/17/2022    10:00 AM   MINAL-7 SCORE   Total Score 0 0 0         5/17/2023     8:04 AM   Last PHQ-9   1.  Little interest or pleasure in doing things 0   2.  Feeling down, depressed, or hopeless 0   3.  Trouble falling or staying asleep, or sleeping too much 0   4.  Feeling tired or having little energy 0   5.  Poor appetite or overeating 1   6.  Feeling bad about yourself 0   7.  Trouble concentrating 0   8.  Moving slowly or restless 0   Q9: Thoughts of better off dead/self-harm past 2 weeks 0   PHQ-9 Total Score 1         2/17/2022    10:00 AM   MINAL-7    1. Feeling nervous, anxious, or on edge 0   2. Not being able to stop or control worrying 0   3. Worrying too much about different things 0   4. Trouble relaxing 0   5. Being so restless that it is hard to sit still 0   6. Becoming easily annoyed or irritable 0   7. Feeling afraid, as if something awful might happen 0   MINAL-7 Total Score 0       Suicide Assessment Five-step Evaluation and Treatment (SAFE-T)          Multiple myeloma she is off treatment for 12 months and now off another 2 months.  Feeling well,, gaining weight.      Review of Systems   Constitutional, HEENT, cardiovascular, pulmonary, GI, , musculoskeletal, neuro, skin, endocrine and psych systems are negative, except as otherwise noted.      Objective    /87 (BP Location: Left arm, Patient Position: Sitting, Cuff Size: Adult Regular)   Pulse 76   Temp 97.3  F (36.3  C) (Tympanic)   Ht 1.6 m (5' 3\")   Wt 75.8 kg (167 lb)   SpO2 95%   BMI 29.58 kg/m    Body mass index is 29.58 kg/m .  Physical Exam   GENERAL: " healthy, alert and no distress  EYES: Eyes grossly normal to inspection, PERRL and conjunctivae and sclerae normal  HENT: ear canals and TM's normal, nose and mouth without ulcers or lesions  NECK: no adenopathy, no asymmetry, masses, or scars and thyroid normal to palpation  RESP: lungs clear to auscultation - no rales, rhonchi or wheezes  CV: regular rate and rhythm, normal S1 S2, no S3 or S4, no murmur, click or rub, no peripheral edema and peripheral pulses strong  ABDOMEN: soft, nontender, no hepatosplenomegaly, no masses and bowel sounds normal  MS: no gross musculoskeletal defects noted, no edema    Lab on 05/09/2023   Component Date Value Ref Range Status     Immunofixation ELP 05/09/2023    Final                    Value:Monoclonal IgG immunoglobulin of kappa light chain type. Monoclonal antibody therapeutics (e.g. Daratumumab) may appear as monoclonal proteins on serum electrophoresis and immunofixation. Results should be interpreted with caution if the patient is receiving monoclonal antibody therapy. Pathologic significance requires clinical correlation.  Clair Lyons M.D., Ph.D.     Beta-2-Microglobulin 05/09/2023 2.5 (H)  <2.3 mg/L Final     Viscosity Index 05/09/2023 1.7  1.4 - 1.8 Final     Immunoglobulin G 05/09/2023 1,172  610 - 1,616 mg/dL Final     Immunoglobulin A 05/09/2023 35 (L)  84 - 499 mg/dL Final     Immunoglobulin M 05/09/2023 62  35 - 242 mg/dL Final     Kappa Free Light Chains 05/09/2023 0.79  0.33 - 1.94 mg/dL Final     Lambda Free Light Chains 05/09/2023 0.45 (L)  0.57 - 2.63 mg/dL Final     Kappa /Lambda Ratio 05/09/2023 1.76 (H)  0.26 - 1.65 Final     Sodium 05/09/2023 135 (L)  136 - 145 mmol/L Final     Potassium 05/09/2023 4.1  3.4 - 5.3 mmol/L Final     Chloride 05/09/2023 101  98 - 107 mmol/L Final     Carbon Dioxide (CO2) 05/09/2023 26  22 - 29 mmol/L Final     Anion Gap 05/09/2023 8  7 - 15 mmol/L Final     Urea Nitrogen 05/09/2023 12.7  8.0 - 23.0 mg/dL Final     Creatinine  05/09/2023 0.77  0.51 - 0.95 mg/dL Final     Calcium 05/09/2023 9.8  8.8 - 10.2 mg/dL Final     Glucose 05/09/2023 124 (H)  70 - 99 mg/dL Final     Alkaline Phosphatase 05/09/2023 151 (H)  35 - 104 U/L Final     AST 05/09/2023 22  10 - 35 U/L Final     ALT 05/09/2023 19  10 - 35 U/L Final     Protein Total 05/09/2023 7.3  6.4 - 8.3 g/dL Final     Albumin 05/09/2023 4.1  3.5 - 5.2 g/dL Final     Bilirubin Total 05/09/2023 0.5  <=1.2 mg/dL Final     GFR Estimate 05/09/2023 80  >60 mL/min/1.73m2 Final    eGFR calculated using 2021 CKD-EPI equation.     Total Protein Serum for ELP 05/09/2023 6.9  6.4 - 8.3 g/dL Final     Albumin 05/09/2023 4.1  3.7 - 5.1 g/dL Final     Alpha 1 05/09/2023 0.3  0.2 - 0.4 g/dL Final     Alpha 2 05/09/2023 0.8  0.5 - 0.9 g/dL Final     Beta Globulin 05/09/2023 0.7  0.6 - 1.0 g/dL Final     Gamma Globulin 05/09/2023 1.1  0.7 - 1.6 g/dL Final     Monoclonal Peak 05/09/2023 0.4 (H)  <=0.0 g/dL Final     ELP Interpretation 05/09/2023 Monoclonal protein (0.4 g/dL) seen in the gamma fraction. See immunofixation report on same specimen. Pathologic significance requires clinical correlation. Clair Lyons M.D., Ph.D.   Final     WBC Count 05/09/2023 5.4  4.0 - 11.0 10e3/uL Final     RBC Count 05/09/2023 4.71  3.80 - 5.20 10e6/uL Final     Hemoglobin 05/09/2023 14.2  11.7 - 15.7 g/dL Final     Hematocrit 05/09/2023 41.4  35.0 - 47.0 % Final     MCV 05/09/2023 88  78 - 100 fL Final     MCH 05/09/2023 30.1  26.5 - 33.0 pg Final     MCHC 05/09/2023 34.3  31.5 - 36.5 g/dL Final     RDW 05/09/2023 14.2  10.0 - 15.0 % Final     Platelet Count 05/09/2023 238  150 - 450 10e3/uL Final     % Neutrophils 05/09/2023 46  % Final     % Lymphocytes 05/09/2023 39  % Final     % Monocytes 05/09/2023 11  % Final     % Eosinophils 05/09/2023 3  % Final     % Basophils 05/09/2023 1  % Final     % Immature Granulocytes 05/09/2023 0  % Final     NRBCs per 100 WBC 05/09/2023 0  <1 /100 Final     Absolute Neutrophils  05/09/2023 2.5  1.6 - 8.3 10e3/uL Final     Absolute Lymphocytes 05/09/2023 2.1  0.8 - 5.3 10e3/uL Final     Absolute Monocytes 05/09/2023 0.6  0.0 - 1.3 10e3/uL Final     Absolute Eosinophils 05/09/2023 0.2  0.0 - 0.7 10e3/uL Final     Absolute Basophils 05/09/2023 0.0  0.0 - 0.2 10e3/uL Final     Absolute Immature Granulocytes 05/09/2023 0.0  <=0.4 10e3/uL Final     Absolute NRBCs 05/09/2023 0.0  10e3/uL Final

## 2023-05-18 ENCOUNTER — HOSPITAL ENCOUNTER (EMERGENCY)
Facility: HOSPITAL | Age: 73
Discharge: HOME | End: 2023-05-18
Attending: NURSE PRACTITIONER | Admitting: NURSE PRACTITIONER
Payer: MEDICARE

## 2023-05-18 VITALS
DIASTOLIC BLOOD PRESSURE: 79 MMHG | SYSTOLIC BLOOD PRESSURE: 131 MMHG | RESPIRATION RATE: 16 BRPM | HEART RATE: 92 BPM | TEMPERATURE: 99 F | OXYGEN SATURATION: 99 %

## 2023-05-18 DIAGNOSIS — L08.9 CAT BITE OF RIGHT FOREARM WITH INFECTION, INITIAL ENCOUNTER: Primary | ICD-10-CM
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
## 2023-05-18 DIAGNOSIS — S61.451A CAT BITE OF RIGHT HAND, INITIAL ENCOUNTER: ICD-10-CM

## 2023-05-18 DIAGNOSIS — S51.851A CAT BITE OF RIGHT FOREARM WITH INFECTION, INITIAL ENCOUNTER: Primary | ICD-10-CM
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
## 2023-05-18 PROCEDURE — 96372 THER/PROPH/DIAG INJ SC/IM: CPT | Performed by: NURSE PRACTITIONER

## 2023-05-18 PROCEDURE — 250N000011 HC RX IP 250 OP 636: Performed by: NURSE PRACTITIONER

## 2023-05-18 PROCEDURE — 250N000013 HC RX MED GY IP 250 OP 250 PS 637: Performed by: NURSE PRACTITIONER

## 2023-05-18 PROCEDURE — 99213 OFFICE O/P EST LOW 20 MIN: CPT | Performed by: NURSE PRACTITIONER

## 2023-05-18 PROCEDURE — G0463 HOSPITAL OUTPT CLINIC VISIT: HCPCS | Mod: 25

## 2023-05-18 RX ORDER — CEFTRIAXONE SODIUM 1 G
1 VIAL (EA) INJECTION ONCE
Status: COMPLETED | OUTPATIENT
Start: 2023-05-18 | End: 2023-05-18

## 2023-05-18 RX ADMIN — AMOXICILLIN AND CLAVULANATE POTASSIUM 1 TABLET: 875; 125 TABLET, FILM COATED ORAL at 19:39

## 2023-05-18 RX ADMIN — CEFTRIAXONE 1 G: 1 INJECTION, POWDER, FOR SOLUTION INTRAMUSCULAR; INTRAVENOUS at 19:39

## 2023-05-18 ASSESSMENT — ENCOUNTER SYMPTOMS
FEVER: 0
WOUND: 1
COLOR CHANGE: 1
CHILLS: 0
MYALGIAS: 0

## 2023-05-18 ASSESSMENT — ACTIVITIES OF DAILY LIVING (ADL): ADLS_ACUITY_SCORE: 35

## 2023-05-18 NOTE — ED TRIAGE NOTES
Pt reports that her cat bit her this morning on the right hand. Pt reports that the cat is vaccinated. Pt has some swelling and redness with red tracking around the puncture site.

## 2023-05-19 NOTE — ED PROVIDER NOTES
History     Chief Complaint   Patient presents with     Cat Bite     HPI  Carmelita Watts is a 72 year old female who presents to urgent care for evaluation of a cat bite to her right hand.  Patient tells me that she went to pet her cat and it ended up biting her to her right hand and forearm.  She cleaned her arm with Dian soap and water.  This evening she noticed a red streak going up her arm which prompted this visit.  She denies any fevers or chills.  No body aches.  The cat is up-to-date on its shots and it is mostly an indoor cat.  Patient's last Tdap was 2021.    Allergies:  Allergies   Allergen Reactions     Lisinopril Cough     Phenylephrine Hcl Other (See Comments)     **Entex  HEADACHE (SEVERE)     Phenylpropanolamine Other (See Comments)     **Entex  HEADACHE (SEVERE)     Pseudoephedrine Tannate Other (See Comments)     **Entex  HEADACHE (SEVERE)     Levofloxacin Rash     **Levaquin     Moxifloxacin Hcl [Moxifloxacin Hcl In Nacl] Rash       Problem List:    Patient Active Problem List    Diagnosis Date Noted     SOB (shortness of breath) 09/20/2021     Priority: Medium     Diabetes mellitus, type 2 (H) 01/18/2021     Priority: Medium     Multiple myeloma not having achieved remission (H) 06/24/2019     Priority: Medium     ACP (advance care planning) 10/26/2016     Priority: Medium     Advance Care Planning 10/26/2016: ACP Review of Chart / Resources Provided:  Reviewed chart for advance care plan.  Carmelita Watts has no plan or code status on file. Discussed available resources and provided with information. Confirmed code status reflects current choices pending further ACP discussions.  Confirmed/documented legally designated decision makers.  Added by Alison Landeros             Major depressive disorder, recurrent episode, mild (H) 10/01/2015     Priority: Medium     Seasonal allergies 10/01/2015     Priority: Medium     Mixed hyperlipidemia 10/01/2015     Priority: Medium     Hyperlipidemia LDL  "goal <130 07/05/2013     Priority: Medium     Hypertension goal BP (blood pressure) < 140/80 07/05/2013     Priority: Medium     Advanced care planning/counseling discussion 07/09/2012     Priority: Medium        Past Medical History:    Past Medical History:   Diagnosis Date     Arthritis      Cyst of breast      Depressive disorder      Diabetes mellitus, type 2 (H) 1/18/2021     Essential hypertension 10/1/2015     Major depressive disorder, recurrent episode, mild (H) 10/1/2015     Mixed hyperlipidemia 10/1/2015     Multiple myeloma not having achieved remission (H) 6/24/2019     Other specified disorders of bladder 07/09/2012     Seasonal allergies 10/1/2015     Unspecified essential hypertension 03/19/2007     Unspecified sinusitis (chronic) 09/05/2007       Past Surgical History:    Past Surgical History:   Procedure Laterality Date     APPENDECTOMY       BONE MARROW BIOPSY, BONE SPECIMEN, NEEDLE/TROCAR N/A 6/18/2019    Procedure: BONE MARROW BIOPSY;  Surgeon: Maciej Sanz MD;  Location: [facility] OR     CHOLECYSTECTOMY       COLONOSCOPY  07-    repeat 10 years     COLONOSCOPY N/A 12/30/2016    Procedure: COLONOSCOPY;  Surgeon: Bhaskar Franklin DO;  Location: [facility] OR     PUNC/ASPIR BREAST CYST Left 1995    benign     SINUS SURGERY       TUBAL STERILIZATION         Family History:    Family History   Problem Relation Age of Onset     Breast Cancer Mother 60        Cause of Death     Parkinsonism Father         \"Possible\"     Coronary Artery Disease Father      Pacemaker Father      Thyroid Disease Daughter      Breast Cancer Maternal Aunt         over 50     Diabetes No family hx of      Hypertension No family hx of      Hyperlipidemia No family hx of      Cerebrovascular Disease No family hx of      Colon Cancer No family hx of      Prostate Cancer No family hx of      Genetic Disorder No family hx of      Asthma No family hx of      Anesthesia Reaction No family hx of        Social History:  Marital " Status:   [5]  Social History     Tobacco Use     Smoking status: Never     Smokeless tobacco: Never     Tobacco comments:     Tried to Quit (YES); QUIT in 1971; Passive Exposure (NO)   Vaping Use     Vaping status: Never Used     Passive vaping exposure: Yes   Substance Use Topics     Alcohol use: Yes     Comment: RARELY     Drug use: No        Medications:    amoxicillin-clavulanate (AUGMENTIN) 875-125 MG tablet  aspirin (ASA) 81 MG chewable tablet  atorvastatin (LIPITOR) 10 MG tablet  diphenhydrAMINE (BENADRYL) 25 MG capsule  fluticasone (FLONASE) 50 MCG/ACT nasal spray  hydrocortisone, Perianal, (HYDROCORTISONE) 2.5 % cream  L-Lysine HCl 500 MG CAPS  losartan (COZAAR) 50 MG tablet  sertraline (ZOLOFT) 50 MG tablet  EPINEPHrine (EPIPEN) 0.3 MG/0.3ML injection          Review of Systems   Constitutional: Negative for chills and fever.   Musculoskeletal: Negative for myalgias.   Skin: Positive for color change and wound.   All other systems reviewed and are negative.      Physical Exam   BP: 131/79  Pulse: 92  Temp: 99  F (37.2  C)  Resp: 16  SpO2: 99 %      Physical Exam  Vitals and nursing note reviewed.   Constitutional:       General: She is not in acute distress.     Appearance: Normal appearance. She is well-developed.   HENT:      Head: Normocephalic and atraumatic.   Eyes:      Conjunctiva/sclera: Conjunctivae normal.      Pupils: Pupils are equal, round, and reactive to light.   Cardiovascular:      Rate and Rhythm: Normal rate.   Pulmonary:      Effort: Pulmonary effort is normal. No respiratory distress.   Abdominal:      General: Abdomen is flat.   Musculoskeletal:      Cervical back: Normal range of motion and neck supple.   Skin:     General: Skin is warm and dry.      Findings: Erythema present.             Comments: About 7 puncture wounds visualized to right distal forearm, volar aspect.  1 puncture wound noted to the dorsal right hand with swelling and surrounding redness.  A red streak is  extending from this puncture wound to the volar aspect of forearm.  No red streaks extending up the arm.  No discharge appreciated.  Mild tenderness to palpation.   Neurological:      Mental Status: She is alert and oriented to person, place, and time.         ED Course                 Procedures              No results found for this or any previous visit (from the past 24 hour(s)).    Medications   cefTRIAXone (ROCEPHIN) in lidocaine 1% (PF) for IM administration 1 g (1 g Intramuscular $Given 5/18/23 1939)   amoxicillin-clavulanate (AUGMENTIN) 875-125 MG per tablet 1 tablet (1 tablet Oral $Given 5/18/23 1939)       Assessments & Plan (with Medical Decision Making)     I have reviewed the nursing notes.    Well-appearing 72-year-old female that presented for evaluation of wounds after her cat bit her this morning.  Patient noticed red streaks which prompted this visit.  She did clean her wounds extensively shortly after the incident this morning.  Her cat is up-to-date on its shots.  Patient's Tdap is up-to-date.  In June due to the rapid progression of the infection, the patient was given IM Rocephin during this visit.  She will be discharged home on Augmentin.  Advised her to keep the wounds clean and dry.  Observe for signs of worsening infection and return here for evaluation.  Follow-up with primary doctor as needed.    I have reviewed the findings, diagnosis, plan and need for follow up with the patient.    This document was prepared using a combination of typing and voice generated software.  While every attempt was made for accuracy, spelling and grammatical errors may exist.    Discharge Medication List as of 5/18/2023  7:44 PM      START taking these medications    Details   amoxicillin-clavulanate (AUGMENTIN) 875-125 MG tablet Take 1 tablet by mouth 2 times daily for 7 days, Disp-14 tablet, R-0, E-Prescribe             Final diagnoses:   Cat bite of right forearm with infection, initial encounter   Cat  bite of right hand, initial encounter       5/18/2023   [facility]     Mpofu, Prudence, CNP  05/18/23 2048       Mpofu, Prudence, CNP  05/18/23 2048

## 2023-05-19 NOTE — DISCHARGE INSTRUCTIONS
Take the antibiotic as prescribed until finished.    Keep your wounds clean and dry.  Observe for signs of worsening infection and return here for evaluation.    Follow-up with your doctor as needed.

## 2023-05-19 NOTE — ED TRIAGE NOTES
Patient presents to urgent care for a cat bite this morning on her right hand. Patient reports her cat is vaccinated. Patient has some swelling, redness and red tracking around the puncture site.

## 2023-05-23 NOTE — PATIENT INSTRUCTIONS
Follow up with DR Walker to discuss treatment on  9/30/2019 after procedure with DR Hernández.      Unstructured MSE

## 2023-06-07 ENCOUNTER — HOSPITAL ENCOUNTER (EMERGENCY)
Facility: HOSPITAL | Age: 73
Discharge: HOME | End: 2023-06-07
Attending: NURSE PRACTITIONER | Admitting: NURSE PRACTITIONER
Payer: MEDICARE

## 2023-06-07 VITALS
HEART RATE: 74 BPM | TEMPERATURE: 97.7 F | DIASTOLIC BLOOD PRESSURE: 72 MMHG | OXYGEN SATURATION: 99 % | RESPIRATION RATE: 18 BRPM | SYSTOLIC BLOOD PRESSURE: 128 MMHG

## 2023-06-07 DIAGNOSIS — S61.052A CAT BITE OF LEFT THUMB, INITIAL ENCOUNTER: Primary | ICD-10-CM
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
## 2023-06-07 PROCEDURE — 99213 OFFICE O/P EST LOW 20 MIN: CPT | Performed by: NURSE PRACTITIONER

## 2023-06-07 PROCEDURE — G0463 HOSPITAL OUTPT CLINIC VISIT: HCPCS

## 2023-06-07 ASSESSMENT — ENCOUNTER SYMPTOMS
WOUND: 1
VOMITING: 0
CHILLS: 0
NAUSEA: 0
FEVER: 0
SHORTNESS OF BREATH: 0
DIARRHEA: 0
PSYCHIATRIC NEGATIVE: 1

## 2023-06-07 NOTE — ED TRIAGE NOTES
Cat bite left thumb this morning, redness on thumb. Puncture noted. Cat is pts up to date on shots.     Jessa Behrman, LPN

## 2023-06-07 NOTE — DISCHARGE INSTRUCTIONS
Augmentin as ordered  - Take entire course of antibiotic even if you start to feel better.  - Antibiotics can cause stomach upset including nausea and diarrhea. Read your bottle or ask the pharmacist if antibiotic can be taken with food to help prevent nausea. If you have symptoms of diarrhea you can take an over-the-counter probiotic and/or increase foods with probiotics such as yogurt, Spooner, sauerkraut.    Follow-up with primary care provider or return to urgent care/ED with any worsening in condition or additional concerns.

## 2023-06-07 NOTE — ED PROVIDER NOTES
History     Chief Complaint   Patient presents with     Cat Bite     HPI  Carmelita Watts is a 72 year old female who presents to urgent care today ambulatory with complaints of a cat bite to left thumb which occurred shortly before arrival.  Patient's own cat and is up-to-date on rabies vaccine.  Tdap last completed on 10/19/2021.  Full ROM of the left thumb/hand.  Denies any fever, chills, nausea, vomiting, diarrhea, shortness of breath or chest pain.  No other concerns.    Allergies:  Allergies   Allergen Reactions     Lisinopril Cough     Phenylephrine Hcl Other (See Comments)     **Entex  HEADACHE (SEVERE)     Phenylpropanolamine Other (See Comments)     **Entex  HEADACHE (SEVERE)     Pseudoephedrine Tannate Other (See Comments)     **Entex  HEADACHE (SEVERE)     Levofloxacin Rash     **Levaquin     Moxifloxacin Hcl [Moxifloxacin Hcl In Nacl] Rash       Problem List:    Patient Active Problem List    Diagnosis Date Noted     SOB (shortness of breath) 09/20/2021     Priority: Medium     Diabetes mellitus, type 2 (H) 01/18/2021     Priority: Medium     Multiple myeloma not having achieved remission (H) 06/24/2019     Priority: Medium     ACP (advance care planning) 10/26/2016     Priority: Medium     Advance Care Planning 10/26/2016: ACP Review of Chart / Resources Provided:  Reviewed chart for advance care plan.  Carmelita Watts has no plan or code status on file. Discussed available resources and provided with information. Confirmed code status reflects current choices pending further ACP discussions.  Confirmed/documented legally designated decision makers.  Added by Alison Landeros             Major depressive disorder, recurrent episode, mild (H) 10/01/2015     Priority: Medium     Seasonal allergies 10/01/2015     Priority: Medium     Mixed hyperlipidemia 10/01/2015     Priority: Medium     Hyperlipidemia LDL goal <130 07/05/2013     Priority: Medium     Hypertension goal BP (blood pressure) < 140/80  "07/05/2013     Priority: Medium     Advanced care planning/counseling discussion 07/09/2012     Priority: Medium        Past Medical History:    Past Medical History:   Diagnosis Date     Arthritis      Cyst of breast      Depressive disorder      Diabetes mellitus, type 2 (H) 1/18/2021     Essential hypertension 10/1/2015     Major depressive disorder, recurrent episode, mild (H) 10/1/2015     Mixed hyperlipidemia 10/1/2015     Multiple myeloma not having achieved remission (H) 6/24/2019     Other specified disorders of bladder 07/09/2012     Seasonal allergies 10/1/2015     Unspecified essential hypertension 03/19/2007     Unspecified sinusitis (chronic) 09/05/2007       Past Surgical History:    Past Surgical History:   Procedure Laterality Date     APPENDECTOMY       BONE MARROW BIOPSY, BONE SPECIMEN, NEEDLE/TROCAR N/A 6/18/2019    Procedure: BONE MARROW BIOPSY;  Surgeon: Maciej Sanz MD;  Location: [facility]     CHOLECYSTECTOMY       COLONOSCOPY  07-    repeat 10 years     COLONOSCOPY N/A 12/30/2016    Procedure: COLONOSCOPY;  Surgeon: Bhaskar Franklin DO;  Location: [facility]     PUNC/ASPIR BREAST CYST Left 1995    benign     SINUS SURGERY       TUBAL STERILIZATION         Family History:    Family History   Problem Relation Age of Onset     Breast Cancer Mother 60        Cause of Death     Parkinsonism Father         \"Possible\"     Coronary Artery Disease Father      Pacemaker Father      Thyroid Disease Daughter      Breast Cancer Maternal Aunt         over 50     Diabetes No family hx of      Hypertension No family hx of      Hyperlipidemia No family hx of      Cerebrovascular Disease No family hx of      Colon Cancer No family hx of      Prostate Cancer No family hx of      Genetic Disorder No family hx of      Asthma No family hx of      Anesthesia Reaction No family hx of        Social History:  Marital Status:   [5]  Social History     Tobacco Use     Smoking status: Never     Smokeless " tobacco: Never     Tobacco comments:     Tried to Quit (YES); QUIT in 1971; Passive Exposure (NO)   Vaping Use     Vaping status: Never Used     Passive vaping exposure: Yes   Substance Use Topics     Alcohol use: Yes     Comment: RARELY     Drug use: No        Medications:    amoxicillin-clavulanate (AUGMENTIN) 875-125 MG tablet  aspirin (ASA) 81 MG chewable tablet  atorvastatin (LIPITOR) 10 MG tablet  diphenhydrAMINE (BENADRYL) 25 MG capsule  EPINEPHrine (EPIPEN) 0.3 MG/0.3ML injection  fluticasone (FLONASE) 50 MCG/ACT nasal spray  hydrocortisone, Perianal, (HYDROCORTISONE) 2.5 % cream  L-Lysine HCl 500 MG CAPS  losartan (COZAAR) 50 MG tablet  sertraline (ZOLOFT) 50 MG tablet      Review of Systems   Constitutional: Negative for chills and fever.   Respiratory: Negative for shortness of breath.    Cardiovascular: Negative for chest pain.   Gastrointestinal: Negative for diarrhea, nausea and vomiting.   Musculoskeletal: Negative for gait problem.   Skin: Positive for wound (small superficial cat bite to left thumb).   Psychiatric/Behavioral: Negative.      Physical Exam   BP: 153/92  Pulse: 74  Temp: 97.7  F (36.5  C)  Resp: 18  SpO2: 99 %  Recheck  BP: 128/72    Physical Exam  Vitals and nursing note reviewed.   Constitutional:       General: She is not in acute distress.     Appearance: Normal appearance. She is not ill-appearing or toxic-appearing.   Cardiovascular:      Rate and Rhythm: Normal rate and regular rhythm.      Pulses: Normal pulses.      Heart sounds: Normal heart sounds.   Pulmonary:      Effort: Pulmonary effort is normal.      Breath sounds: Normal breath sounds.   Skin:     General: Skin is warm and dry.      Capillary Refill: Capillary refill takes less than 2 seconds.      Findings: Wound (Small superficial cat bite to left thumb, mild redness, no drainage.) present.   Neurological:      Mental Status: She is alert.   Psychiatric:         Mood and Affect: Mood normal.       ED Course     No  results found for this or any previous visit (from the past 24 hour(s)).    Medications - No data to display    Assessments & Plan (with Medical Decision Making)     I have reviewed the nursing notes.    I have reviewed the findings, diagnosis, plan and need for follow up with the patient.  (S61.052A,  W55.01XA) Cat bite of left thumb, initial encounter  (primary encounter diagnosis)  Plan:   Patient ambulatory with a nontoxic appearance.  Patient has a superficial cat bite to left thumb, mild redness surrounding, no drainage.  Full ROM.  Denies any fever, chills, nausea, vomiting, diarrhea, shortness of breath or chest pain.  Patient's own cat and is up-to-date on rabies vaccines.  Tdap is up-to-date.  Patient encouraged to monitor cat for the next 10 days regardless of vaccine status and follow-up as needed.  Augmentin as ordered.  Follow-up with primary care provider or return to urgent care/ED with any worsening in condition or additional concerns.  Patient is in agreement with treatment plan.    Discharge Medication List as of 6/7/2023  2:26 PM      START taking these medications    Details   amoxicillin-clavulanate (AUGMENTIN) 875-125 MG tablet Take 1 tablet by mouth 2 times daily for 7 days, Disp-14 tablet, R-0, E-Prescribe           Final diagnoses:   Cat bite of left thumb, initial encounter     6/7/2023   [facility]     Bindu Ordoñez, MATTHIEU  06/07/23 [id number]

## 2023-06-15 ENCOUNTER — TELEPHONE (OUTPATIENT)
Dept: OTOLARYNGOLOGY | Facility: OTHER | Age: 73
End: 2023-06-15

## 2023-06-15 NOTE — TELEPHONE ENCOUNTER
Patient is wondering if she is able to get a prescription sent over to the pharmacy for her left ear. She thinks she has another ear infection.    Thank you      Daisy Roberts    [phone]

## 2023-07-01 ENCOUNTER — TRANSFERRED RECORDS (OUTPATIENT)
Dept: MULTI SPECIALTY CLINIC | Facility: CLINIC | Age: 73
End: 2023-07-01

## 2023-07-01 LAB — RETINOPATHY: NORMAL

## 2023-07-05 NOTE — PROGRESS NOTES
"Oncology Follow-up Visit    Reason for Visit:  Carmelita is a 72 year old woman with a diagnosis of multiple myeloma, who I am visiting with today for routine follow-up.     Nursing Note and documentation reviewed: Yes    Interval History:   Carlene notes that she has been feeling well. Comments that \"I wouldn't know something was even wrong with me.\" Denies any recent signs of infection. Was no antibiotics however a couple times since I saw her last related to cat bites. No bleeding concerns. Appetite is good. Notes some weight gain related to stress eating. States that she has been under a lot of stress (cat bites, ER visits, putting cat down, ordering husbands grave stone, an episode of vertigo, etc). Bowels normal. No skin concerns. Energy good. Staying active. Overall, feeling well.     Oncologic History  12/31/2018 Presented to the emergency room with vertigo and fatigue.  CT scan of the head was negative and subsequent stress test was negative.  05/03/2019 PCP ordered lab work and noted a total protein of 12.9.  SPEP at that time showed an M spike of 6.2 with a large monoclonal protein seen in the gamma fraction. Urine immunofixation showed a possible small protein band in the gamma fraction  05/31/2019 Evaluated by Dr. Walker with Medical Oncology with plan to rule out myeloma and obtain bone marrow aspiration biopsy as well as a metastatic bone survey along with additional labwork  06/18/2019 Underwent bone marrow aspiration and biopsy  06/24/2019 Seen again by Dr. Walker and CBC showed a hemoglobin of 9.3, M spike 7.3 with monoclonal IgG immunoglobulin of kappa light chain type; serum viscosity was 2.9; quantitative immunoglobulins showed an IgG of 8160, beta-2 microglobulin was 5.8, BUN was 21 with creatinine is 0.8 and total protein was 13.7.  Quantitative kappa/lambda free light chains showed an elevated ratio of 17.0 bone marrow aspiration biopsy showed plasma cell myeloma with approximately 80% plasma " cells.  Immunofixation showed IgG kappa and flow cytometry revealed kappa monotypic plasma cells consistent with clonal plasma cell neoplasm and FISH panel was pending at that time. It was felt she had at least stage II disease based on her beta-2 microglobulin and anemia. Plan was to treat with Velcade 1.3 mg per/m2 days 1, 4, 8 and 11/Decadron 40 mg on days 1, 8 and 15. Initiation of Revlimid with C2 at 25 mg daily days 1 through 14.  Plan was to also obtain an MRI of the lumbar spine to rule out lytic lesion at L3.  She was initiated on Zovirax 400 mg p.o. twice daily.  06/25/2019 C1 of chemotherapy initiated  07/01/2019 Note in chart regarding patient's large co-pay for the Revlimid and no plan at this point to initiate Revlimid and treat only with Velcade and Decadron per Dr. Walker  07/11/2019 MRI lumbar spine shows a pathologic superior endplate compression fracture at L3 without evidence of retropulsed fragment and innumerable enhancing lesions throughout the lumbar spine consistent with history of multiple myeloma.  09/10/2019 Increasing M-spike and kappa lambda ratio  10/01/2019 Initiation of CyBorD  11/17/2020 Plateau of M-spike at 1.2 after 36 cycles of CyBorD  12/01/2020 Initiation of Darzalex Faspro injection  03/23/2021 M-spike increased form 1.0 to 1.2 and velcade and dexamethasone added to plan  04/12/2022 Treatment HOLIDAY commenced with Mspike 0.3 after 22 cycles of treatent  05/27/2022 Received Evusheld while on treatment holiday.   07/18/2023 Mspike 0.8. Other labs stable. PET scan ordered.     Current Chemo Regime/TX: Darzalex faspro injection 1800mg subcutaneous per protocol on Day 1 with velcade 1.3mg/m2 on days 1, 4, 8, 11 with weekly Dexamethasone, given every 21 days      **Zometa 4mg every 3 months    Current Cycle: Treatment holiday     Previous treatment:   Velcade 1.3 mg/m2 days 1, 4, 8 and 11 with Decadron 40 mg days 1, 8 and 15 x 4 cycles;   Velcade 1.5mg.m2/cyclophosphamide 150mg  "every 7 days on days 1,8,15 and 22/decadron 40mg days 1,8,15,22 ; Darzalex faspro injection 1800mg subcutaneous per protocol    Past Medical History:   Diagnosis Date     Arthritis      Cyst of breast      Depressive disorder      Diabetes mellitus, type 2 (H) 1/18/2021     Essential hypertension 10/1/2015     Major depressive disorder, recurrent episode, mild (H) 10/1/2015     Mixed hyperlipidemia 10/1/2015     Multiple myeloma not having achieved remission (H) 6/24/2019     Other specified disorders of bladder 07/09/2012    Irritable Bladder     Seasonal allergies 10/1/2015     Unspecified essential hypertension 03/19/2007     Unspecified sinusitis (chronic) 09/05/2007       Past Surgical History:   Procedure Laterality Date     APPENDECTOMY       BONE MARROW BIOPSY, BONE SPECIMEN, NEEDLE/TROCAR N/A 6/18/2019    Procedure: BONE MARROW BIOPSY;  Surgeon: Maciej Sanz MD;  Location: [facility]     CHOLECYSTECTOMY       COLONOSCOPY  07-    repeat 10 years     COLONOSCOPY N/A 12/30/2016    Procedure: COLONOSCOPY;  Surgeon: Bhaskar Franklin DO;  Location: [facility]     PUNC/ASPIR BREAST CYST Left 1995    benign     SINUS SURGERY       TUBAL STERILIZATION         Family History   Problem Relation Age of Onset     Breast Cancer Mother 60        Cause of Death     Parkinsonism Father         \"Possible\"     Coronary Artery Disease Father      Pacemaker Father      Thyroid Disease Daughter      Breast Cancer Maternal Aunt         over 50     Diabetes No family hx of      Hypertension No family hx of      Hyperlipidemia No family hx of      Cerebrovascular Disease No family hx of      Colon Cancer No family hx of      Prostate Cancer No family hx of      Genetic Disorder No family hx of      Asthma No family hx of      Anesthesia Reaction No family hx of        Social History     Socioeconomic History     Marital status:      Spouse name: Not on file     Number of children: Not on file     Years of education: " Not on file     Highest education level: Not on file   Occupational History     Occupation: Financial     Comment:  - ([employer])   Tobacco Use     Smoking status: Never     Smokeless tobacco: Never     Tobacco comments:     Tried to Quit (YES); QUIT in 1971; Passive Exposure (NO)   Vaping Use     Vaping Use: Never used   Substance and Sexual Activity     Alcohol use: Yes     Comment: RARELY     Drug use: No     Sexual activity: Yes     Partners: Male     Birth control/protection: None   Other Topics Concern      Service Not Asked     Blood Transfusions Not Asked     Caffeine Concern Yes     Comment: Coffee >6 cups daily     Occupational Exposure Not Asked     Hobby Hazards Not Asked     Sleep Concern Not Asked     Stress Concern Not Asked     Weight Concern Not Asked     Special Diet Not Asked     Back Care Not Asked     Exercise Not Asked     Bike Helmet Not Asked     Seat Belt Not Asked     Self-Exams Not Asked     Parent/sibling w/ CABG, MI or angioplasty before 65F 55M? No   Social History Narrative     Not on file     Social Determinants of Health     Financial Resource Strain: Not on file   Food Insecurity: Not on file   Transportation Needs: Not on file   Physical Activity: Not on file   Stress: Not on file   Social Connections: Not on file   Intimate Partner Violence: Not on file   Housing Stability: Not on file       Current Outpatient Medications   Medication     aspirin (ASA) 81 MG chewable tablet     atorvastatin (LIPITOR) 10 MG tablet     diphenhydrAMINE (BENADRYL) 25 MG capsule     fluticasone (FLONASE) 50 MCG/ACT nasal spray     L-Lysine HCl 500 MG CAPS     losartan (COZAAR) 50 MG tablet     sertraline (ZOLOFT) 50 MG tablet     EPINEPHrine (EPIPEN) 0.3 MG/0.3ML injection     hydrocortisone, Perianal, (HYDROCORTISONE) 2.5 % cream     No current facility-administered medications for this visit.        Allergies   Allergen Reactions     Lisinopril Cough     Phenylephrine Hcl Other (See  "Comments)     **Entex  HEADACHE (SEVERE)     Phenylpropanolamine Other (See Comments)     **Entex  HEADACHE (SEVERE)     Pseudoephedrine Tannate Other (See Comments)     **Entex  HEADACHE (SEVERE)     Levofloxacin Rash     **Levaquin     Moxifloxacin Hcl [Moxifloxacin Hcl In Nacl] Rash       Review Of Systems:  A complete review of systems is negative except for the above mentioned items in the interval history.     Physical Exam:  /82   Pulse 80   Temp 98.4  F (36.9  C) (Tympanic)   Resp 16   Ht 1.6 m (5' 3\")   Wt 76.9 kg (169 lb 8.5 oz)   SpO2 98%   BMI 30.03 kg/m    GENERAL APPEARANCE: Healthy, alert and in no acute distress.  HEENT: Eyes appear normal without scleral icterus. Extraocular movements intact.   NECK:   Supple with normal range of motion. No asymmetry or masses.  LYMPHATICS: No palpable cervical, supraclavicular nodes.  RESP: Lungs clear to auscultation bilaterally, respirations regular and easy.  CARDIOVASCULAR: Regular rate and rhythm. Normal S1, S2; no murmur, gallop, or rub.  ABDOMEN: Soft, non-tender, non-distended. Bowel sounds auscultated all 4 quadrants. No palpable organomegaly or masses.  MUSCULOSKELETAL: Extremities without gross deformities noted. No edema of bilateral lower extremities.  SKIN: No suspicious lesions or rashes.  NEURO: Alert and oriented x 3.  Gait steady.  PSYCHIATRIC: Mentation and affect appear normal.  Mood appropriate.    Laboratory:   Component      Latest Ref [facility] 7/11/2023  8:34 AM   WBC      4.0 - 11.0 10e3/uL 5.1    RBC Count      3.80 - 5.20 10e6/uL 4.67    Hemoglobin      11.7 - 15.7 g/dL 14.2    Hematocrit      35.0 - 47.0 % 42.4    MCV      78 - 100 fL 91    MCH      26.5 - 33.0 pg 30.4    MCHC      31.5 - 36.5 g/dL 33.5    RDW      10.0 - 15.0 % 14.1    Platelet Count      150 - 450 10e3/uL 218    % Neutrophils      % 45    % Lymphocytes      % 39    % Monocytes      % 10    % Eosinophils      % 4    % Basophils      % 1    % Immature " Granulocytes      % 1    NRBCs per 100 WBC      <1 /100 0    Absolute Neutrophils      1.6 - 8.3 10e3/uL 2.3    Absolute Lymphocytes      0.8 - 5.3 10e3/uL 2.0    Absolute Monocytes      0.0 - 1.3 10e3/uL 0.5    Absolute Eosinophils      0.0 - 0.7 10e3/uL 0.2    Absolute Basophils      0.0 - 0.2 10e3/uL 0.0    Absolute Immature Granulocytes      <=0.4 10e3/uL 0.1    Absolute NRBCs      10e3/uL 0.0    Sodium      136 - 145 mmol/L 135 (L)    Potassium      3.4 - 5.3 mmol/L 4.2    Chloride      98 - 107 mmol/L 101    Carbon Dioxide (CO2)      22 - 29 mmol/L 23    Anion Gap      7 - 15 mmol/L 11    Urea Nitrogen      8.0 - 23.0 mg/dL 20.3    Creatinine      0.51 - 0.95 mg/dL 0.75    Calcium      8.8 - 10.2 mg/dL 9.4    Glucose      70 - 99 mg/dL 122 (H)    Alkaline Phosphatase      35 - 104 U/L 139 (H)    AST      0 - 45 U/L 22    ALT      0 - 50 U/L 18    Protein Total      6.4 - 8.3 g/dL 7.1    Albumin      3.5 - 5.2 g/dL 4.1    Bilirubin Total      <=1.2 mg/dL 0.5    GFR Estimate      >60 mL/min/1.73m2 83    Albumin Fraction      3.7 - 5.1 g/dL 4.1    Alpha 1 Fraction      0.2 - 0.4 g/dL 0.3    Alpha 2 Fraction      0.5 - 0.9 g/dL 0.7    Beta Fraction      0.6 - 1.0 g/dL 0.7    Gamma Fraction      0.7 - 1.6 g/dL 1.2    Monoclonal Peak      <=0.0 g/dL 0.8 (H)    ELP Interpretation: Monoclonal protein (about 0.8 g/dL) seen in the gamma fraction. Previously characterized in our laboratory on 5/9/2023 as a monoclonal IgG immunoglobulin of kappa light chain type. Pathologic significance requires clinical correlation. BIA Izaguirre M.D., Ph.D., Pathologist ().    [place] Free Lt Chain      0.33 - 1.94 mg/dL 0.88    Lambda Free Lt Chain      0.57 - 2.63 mg/dL 0.47 (L)    Kappa Lambda Ratio      0.26 - 1.65  1.87 (H)    IGG      610 - 1,616 mg/dL 1,327    IGA      84 - 499 mg/dL 34 (L)    IGM      35 - 242 mg/dL 76    Viscosity Index      1.4 - 1.8  1.7    Beta-2-Microglobulin      <2.3 mg/L 2.9 (H)    Total  Protein Serum for ELP      6.4 - 8.3 g/dL 7.0       Legend:  (L) Low  (H) High      Imaging Studies:  None this visit.     ASSESSMENT/PLAN:  #1 Multiple myeloma IgG kappa multiple myeloma with 80% involvement of the bone marrow diagnosed June 2019 initially treated with Velcade and Decadron and received 4 cycles with increasing M-spike and kappa/lambda ratio.  Treatment changed to CyBorD on 10/1/2019.  M-spike plateau at 1.2 and treatment changed to monotherapy with Darzalex Faspro injection. M spike declined to 1.0 then increased again to 1.2 so Velcade and Dex added to her treatment plan with cycle 5 therapy. Following C22 (April 2022), she did initiate a treatment holiday when her Mspike was 0.3. We have followed her counts for over a year.     Today, she returns feeling well. Her mspike has taken a bit of a jump from 0.4 to 0.8. Reviewed findings with Dr. Walker who would like patient to have a PET scan and then review results with him. The remainder of her myeloma labs reasonably stable and her blood counts normal. No evidence of end organ damage. Discussed with patient that we will acquire more info and she can then discuss with Dr. Walker. States that she has enjoyed her holiday and would restart treatment if that is what was needed. Will work on getting PET scheduled and have her follow-up with Dr. Walker.     #2 Lytic lesions She remains on calcium with vitamin D twice a day. Weight bearing activity. Will resume zometa with treatment. Currently on hold.      Patient in agreement with plan and verbalizes understanding. Agrees to call with any questions or concerns.    40 minutes spent in the patient's encounter today with time spent in review of patient's chart along with chart preparation and review of the treatment plan and signing of treatment plan.  Time was also spent with the patient in obtaining a review of systems and performing a physical exam along with detailed review of all test results.  Time was also spent in discussing plan for future follow-up and relating instructions for follow-up and in placing future orders.    FLO Duque, FNP-C

## 2023-07-06 ENCOUNTER — TRANSFERRED RECORDS (OUTPATIENT)
Dept: HEALTH INFORMATION MANAGEMENT | Facility: CLINIC | Age: 73
End: 2023-07-06

## 2023-07-06 LAB — RETINOPATHY: NEGATIVE

## 2023-07-11 ENCOUNTER — LAB (OUTPATIENT)
Dept: LAB | Facility: OTHER | Age: 73
End: 2023-07-11
Payer: MEDICARE

## 2023-07-11 DIAGNOSIS — C90.00 MULTIPLE MYELOMA NOT HAVING ACHIEVED REMISSION (H): ICD-10-CM

## 2023-07-11 DIAGNOSIS — R74.8 ELEVATED ALKALINE PHOSPHATASE LEVEL: ICD-10-CM

## 2023-07-11 DIAGNOSIS — E78.2 MIXED HYPERLIPIDEMIA: ICD-10-CM

## 2023-07-11 LAB
ALBUMIN SERPL BCG-MCNC: 4.1 G/DL (ref 3.5–5.2)
ALP SERPL-CCNC: 139 U/L (ref 35–104)
ALT SERPL W P-5'-P-CCNC: 18 U/L (ref 0–50)
ANION GAP SERPL CALCULATED.3IONS-SCNC: 11 MMOL/L (ref 7–15)
AST SERPL W P-5'-P-CCNC: 22 U/L (ref 0–45)
BASOPHILS # BLD AUTO: 0 10E3/UL (ref 0–0.2)
BASOPHILS NFR BLD AUTO: 1 %
BILIRUB SERPL-MCNC: 0.5 MG/DL
BUN SERPL-MCNC: 20.3 MG/DL (ref 8–23)
CALCIUM SERPL-MCNC: 9.4 MG/DL (ref 8.8–10.2)
CHLORIDE SERPL-SCNC: 101 MMOL/L (ref 98–107)
CHOLEST SERPL-MCNC: 220 MG/DL
CREAT SERPL-MCNC: 0.75 MG/DL (ref 0.51–0.95)
DEPRECATED HCO3 PLAS-SCNC: 23 MMOL/L (ref 22–29)
EOSINOPHIL # BLD AUTO: 0.2 10E3/UL (ref 0–0.7)
EOSINOPHIL NFR BLD AUTO: 4 %
ERYTHROCYTE [DISTWIDTH] IN BLOOD BY AUTOMATED COUNT: 14.1 % (ref 10–15)
EST. AVERAGE GLUCOSE BLD GHB EST-MCNC: 137 MG/DL
GFR SERPL CREATININE-BSD FRML MDRD: 83 ML/MIN/1.73M2
GLUCOSE SERPL-MCNC: 122 MG/DL (ref 70–99)
HBA1C MFR BLD: 6.4 %
HCT VFR BLD AUTO: 42.4 % (ref 35–47)
HDLC SERPL-MCNC: 50 MG/DL
HGB BLD-MCNC: 14.2 G/DL (ref 11.7–15.7)
IMM GRANULOCYTES # BLD: 0.1 10E3/UL
IMM GRANULOCYTES NFR BLD: 1 %
LDLC SERPL CALC-MCNC: 133 MG/DL
LYMPHOCYTES # BLD AUTO: 2 10E3/UL (ref 0.8–5.3)
LYMPHOCYTES NFR BLD AUTO: 39 %
MCH RBC QN AUTO: 30.4 PG (ref 26.5–33)
MCHC RBC AUTO-ENTMCNC: 33.5 G/DL (ref 31.5–36.5)
MCV RBC AUTO: 91 FL (ref 78–100)
MONOCYTES # BLD AUTO: 0.5 10E3/UL (ref 0–1.3)
MONOCYTES NFR BLD AUTO: 10 %
NEUTROPHILS # BLD AUTO: 2.3 10E3/UL (ref 1.6–8.3)
NEUTROPHILS NFR BLD AUTO: 45 %
NONHDLC SERPL-MCNC: 170 MG/DL
NRBC # BLD AUTO: 0 10E3/UL
NRBC BLD AUTO-RTO: 0 /100
PLATELET # BLD AUTO: 218 10E3/UL (ref 150–450)
POTASSIUM SERPL-SCNC: 4.2 MMOL/L (ref 3.4–5.3)
PROT SERPL-MCNC: 7.1 G/DL (ref 6.4–8.3)
RBC # BLD AUTO: 4.67 10E6/UL (ref 3.8–5.2)
SODIUM SERPL-SCNC: 135 MMOL/L (ref 136–145)
TOTAL PROTEIN SERUM FOR ELP: 7 G/DL (ref 6.4–8.3)
TRIGL SERPL-MCNC: 187 MG/DL
TSH SERPL DL<=0.005 MIU/L-ACNC: 2.47 UIU/ML (ref 0.3–4.2)
WBC # BLD AUTO: 5.1 10E3/UL (ref 4–11)

## 2023-07-11 PROCEDURE — 80053 COMPREHEN METABOLIC PANEL: CPT | Mod: ZL

## 2023-07-11 PROCEDURE — 83036 HEMOGLOBIN GLYCOSYLATED A1C: CPT | Mod: ZL

## 2023-07-11 PROCEDURE — 84443 ASSAY THYROID STIM HORMONE: CPT | Mod: ZL

## 2023-07-11 PROCEDURE — 84165 PROTEIN E-PHORESIS SERUM: CPT | Mod: ZL | Performed by: PATHOLOGY

## 2023-07-11 PROCEDURE — 83521 IG LIGHT CHAINS FREE EACH: CPT | Mod: ZL

## 2023-07-11 PROCEDURE — 36415 COLL VENOUS BLD VENIPUNCTURE: CPT | Mod: ZL

## 2023-07-11 PROCEDURE — 82232 ASSAY OF BETA-2 PROTEIN: CPT | Mod: ZL

## 2023-07-11 PROCEDURE — 85025 COMPLETE CBC W/AUTO DIFF WBC: CPT | Mod: ZL

## 2023-07-11 PROCEDURE — 85810 BLOOD VISCOSITY EXAMINATION: CPT | Mod: ZL

## 2023-07-11 PROCEDURE — 80061 LIPID PANEL: CPT | Mod: ZL

## 2023-07-11 PROCEDURE — 82784 ASSAY IGA/IGD/IGG/IGM EACH: CPT | Mod: ZL

## 2023-07-11 PROCEDURE — 84165 PROTEIN E-PHORESIS SERUM: CPT | Mod: 26 | Performed by: PATHOLOGY

## 2023-07-11 PROCEDURE — 84155 ASSAY OF PROTEIN SERUM: CPT | Mod: ZL,XU

## 2023-07-12 LAB
ALBUMIN SERPL ELPH-MCNC: 4.1 G/DL (ref 3.7–5.1)
ALPHA1 GLOB SERPL ELPH-MCNC: 0.3 G/DL (ref 0.2–0.4)
ALPHA2 GLOB SERPL ELPH-MCNC: 0.7 G/DL (ref 0.5–0.9)
B-GLOBULIN SERPL ELPH-MCNC: 0.7 G/DL (ref 0.6–1)
B2 MICROGLOB TUMOR MARKER SER-MCNC: 2.9 MG/L
GAMMA GLOB SERPL ELPH-MCNC: 1.2 G/DL (ref 0.7–1.6)
IGA SERPL-MCNC: 34 MG/DL (ref 84–499)
IGG SERPL-MCNC: 1327 MG/DL (ref 610–1616)
IGM SERPL-MCNC: 76 MG/DL (ref 35–242)
KAPPA LC FREE SER-MCNC: 0.88 MG/DL (ref 0.33–1.94)
KAPPA LC FREE/LAMBDA FREE SER NEPH: 1.87 {RATIO} (ref 0.26–1.65)
LAMBDA LC FREE SERPL-MCNC: 0.47 MG/DL (ref 0.57–2.63)
M PROTEIN SERPL ELPH-MCNC: 0.8 G/DL
PROT PATTERN SERPL ELPH-IMP: ABNORMAL
VISC SER: 1.7 CP (ref 1.4–1.8)

## 2023-07-18 ENCOUNTER — ONCOLOGY VISIT (OUTPATIENT)
Dept: ONCOLOGY | Facility: OTHER | Age: 73
End: 2023-07-18
Attending: NURSE PRACTITIONER
Payer: COMMERCIAL

## 2023-07-18 VITALS
BODY MASS INDEX: 30.04 KG/M2 | OXYGEN SATURATION: 98 % | HEIGHT: 63 IN | DIASTOLIC BLOOD PRESSURE: 82 MMHG | SYSTOLIC BLOOD PRESSURE: 128 MMHG | HEART RATE: 80 BPM | WEIGHT: 169.53 LBS | TEMPERATURE: 98.4 F | RESPIRATION RATE: 16 BRPM

## 2023-07-18 DIAGNOSIS — C90.00 MULTIPLE MYELOMA NOT HAVING ACHIEVED REMISSION (H): Primary | ICD-10-CM

## 2023-07-18 PROCEDURE — 99215 OFFICE O/P EST HI 40 MIN: CPT | Performed by: NURSE PRACTITIONER

## 2023-07-18 PROCEDURE — G0463 HOSPITAL OUTPT CLINIC VISIT: HCPCS

## 2023-07-18 ASSESSMENT — PAIN SCALES - GENERAL: PAINLEVEL: NO PAIN (0)

## 2023-07-18 NOTE — NURSING NOTE
"Oncology Rooming Note    July 18, 2023 12:58 PM   Carmelita Watts is a 72 year old female who presents for:    Chief Complaint   Patient presents with     Oncology Clinic Visit     Initial Vitals: /82   Pulse 80   Temp 98.4  F (36.9  C) (Tympanic)   Resp 16   Ht 1.6 m (5' 3\")   Wt 76.9 kg (169 lb 8.5 oz)   SpO2 98%   BMI 30.03 kg/m   Estimated body mass index is 30.03 kg/m  as calculated from the following:    Height as of this encounter: 1.6 m (5' 3\").    Weight as of this encounter: 76.9 kg (169 lb 8.5 oz). Body surface area is 1.85 meters squared.  No Pain (0) Comment: Data Unavailable   No LMP recorded. Patient is postmenopausal.  Allergies reviewed: Yes  Medications reviewed: Yes    Medications: Medication refills not needed today.  Pharmacy name entered into [facility]:    [id number] - [place], [place] - [address]  AT [facility] [address]  [facility] - [place], [place] - [address] [facility] [place] [facility] [address] [facility]    Clinical concerns: None      Julia Anderson RN              "

## 2023-07-18 NOTE — PATIENT INSTRUCTIONS
Thank you for allowing me to be a part of your care today.    I would like to get you scheduled for the next available PET scan and have you follow-up with Dr. Walker afterwards.    If you have any questions please call [phone]    Other instructions:      None

## 2023-07-25 ENCOUNTER — TELEPHONE (OUTPATIENT)
Dept: ONCOLOGY | Facility: OTHER | Age: 73
End: 2023-07-25

## 2023-07-25 DIAGNOSIS — C90.00 MULTIPLE MYELOMA NOT HAVING ACHIEVED REMISSION (H): Primary | ICD-10-CM

## 2023-07-25 RX ORDER — LORAZEPAM 0.5 MG/1
0.5 TABLET ORAL ONCE
Qty: 1 TABLET | Refills: 0 | Status: SHIPPED | OUTPATIENT
Start: 2023-07-25 | End: 2023-07-25

## 2023-07-25 NOTE — TELEPHONE ENCOUNTER
Patient stopped in today requesting a sedative to have prior to her PET scan on 8/2/23. Pt uses [facility]. Advised I would send a message to provider for review.

## 2023-07-25 NOTE — TELEPHONE ENCOUNTER
Called and notified prescription sent to [facility]. Advised to take 30 minutes prior to PET scan and to have a . Pt verbalized understanding.

## 2023-08-02 ENCOUNTER — HOSPITAL ENCOUNTER (OUTPATIENT)
Dept: PET IMAGING | Facility: HOSPITAL | Age: 73
Discharge: HOME | End: 2023-08-02
Attending: NURSE PRACTITIONER | Admitting: NURSE PRACTITIONER
Payer: MEDICARE

## 2023-08-02 DIAGNOSIS — C90.00 MULTIPLE MYELOMA NOT HAVING ACHIEVED REMISSION (H): ICD-10-CM

## 2023-08-02 PROCEDURE — 343N000001 HC RX 343: Performed by: NURSE PRACTITIONER

## 2023-08-02 PROCEDURE — G1010 CDSM STANSON: HCPCS | Mod: PI

## 2023-08-02 PROCEDURE — A9552 F18 FDG: HCPCS | Performed by: NURSE PRACTITIONER

## 2023-08-02 RX ADMIN — FLUDEOXYGLUCOSE F-18 13.31 MILLICURIE: 500 INJECTION, SOLUTION INTRAVENOUS at 08:01

## 2023-08-14 ENCOUNTER — ONCOLOGY VISIT (OUTPATIENT)
Dept: ONCOLOGY | Facility: OTHER | Age: 73
End: 2023-08-14
Attending: INTERNAL MEDICINE
Payer: MEDICARE

## 2023-08-14 VITALS
RESPIRATION RATE: 20 BRPM | TEMPERATURE: 98.8 F | HEART RATE: 96 BPM | SYSTOLIC BLOOD PRESSURE: 128 MMHG | BODY MASS INDEX: 30.16 KG/M2 | WEIGHT: 170.19 LBS | HEIGHT: 63 IN | OXYGEN SATURATION: 94 % | DIASTOLIC BLOOD PRESSURE: 80 MMHG

## 2023-08-14 DIAGNOSIS — C90.00 MULTIPLE MYELOMA NOT HAVING ACHIEVED REMISSION (H): Primary | ICD-10-CM

## 2023-08-14 LAB
ALBUMIN SERPL BCG-MCNC: 4.1 G/DL (ref 3.5–5.2)
ALP SERPL-CCNC: 139 U/L (ref 35–104)
ALT SERPL W P-5'-P-CCNC: 15 U/L (ref 0–50)
ANION GAP SERPL CALCULATED.3IONS-SCNC: 11 MMOL/L (ref 7–15)
AST SERPL W P-5'-P-CCNC: 21 U/L (ref 0–45)
BASOPHILS # BLD AUTO: 0 10E3/UL (ref 0–0.2)
BASOPHILS NFR BLD AUTO: 1 %
BILIRUB SERPL-MCNC: 0.3 MG/DL
BUN SERPL-MCNC: 13.3 MG/DL (ref 8–23)
CALCIUM SERPL-MCNC: 9.8 MG/DL (ref 8.8–10.2)
CHLORIDE SERPL-SCNC: 102 MMOL/L (ref 98–107)
CREAT SERPL-MCNC: 0.81 MG/DL (ref 0.51–0.95)
DEPRECATED HCO3 PLAS-SCNC: 25 MMOL/L (ref 22–29)
EOSINOPHIL # BLD AUTO: 0.3 10E3/UL (ref 0–0.7)
EOSINOPHIL NFR BLD AUTO: 4 %
ERYTHROCYTE [DISTWIDTH] IN BLOOD BY AUTOMATED COUNT: 13.6 % (ref 10–15)
GFR SERPL CREATININE-BSD FRML MDRD: 76 ML/MIN/1.73M2
GLUCOSE SERPL-MCNC: 109 MG/DL (ref 70–99)
HCT VFR BLD AUTO: 40.1 % (ref 35–47)
HGB BLD-MCNC: 13.5 G/DL (ref 11.7–15.7)
IMM GRANULOCYTES # BLD: 0 10E3/UL
IMM GRANULOCYTES NFR BLD: 1 %
LDH SERPL L TO P-CCNC: 147 U/L (ref 0–250)
LYMPHOCYTES # BLD AUTO: 2.3 10E3/UL (ref 0.8–5.3)
LYMPHOCYTES NFR BLD AUTO: 38 %
MCH RBC QN AUTO: 30.5 PG (ref 26.5–33)
MCHC RBC AUTO-ENTMCNC: 33.7 G/DL (ref 31.5–36.5)
MCV RBC AUTO: 91 FL (ref 78–100)
MONOCYTES # BLD AUTO: 0.6 10E3/UL (ref 0–1.3)
MONOCYTES NFR BLD AUTO: 10 %
NEUTROPHILS # BLD AUTO: 2.8 10E3/UL (ref 1.6–8.3)
NEUTROPHILS NFR BLD AUTO: 46 %
NRBC # BLD AUTO: 0 10E3/UL
NRBC BLD AUTO-RTO: 0 /100
PLATELET # BLD AUTO: 227 10E3/UL (ref 150–450)
POTASSIUM SERPL-SCNC: 3.7 MMOL/L (ref 3.4–5.3)
PROT SERPL-MCNC: 6.7 G/DL (ref 6.4–8.3)
RBC # BLD AUTO: 4.43 10E6/UL (ref 3.8–5.2)
SODIUM SERPL-SCNC: 138 MMOL/L (ref 136–145)
WBC # BLD AUTO: 6 10E3/UL (ref 4–11)

## 2023-08-14 PROCEDURE — 85810 BLOOD VISCOSITY EXAMINATION: CPT | Mod: ZL | Performed by: INTERNAL MEDICINE

## 2023-08-14 PROCEDURE — 86334 IMMUNOFIX E-PHORESIS SERUM: CPT | Mod: 26 | Performed by: PATHOLOGY

## 2023-08-14 PROCEDURE — 86334 IMMUNOFIX E-PHORESIS SERUM: CPT | Mod: ZL | Performed by: STUDENT IN AN ORGANIZED HEALTH CARE EDUCATION/TRAINING PROGRAM

## 2023-08-14 PROCEDURE — 82232 ASSAY OF BETA-2 PROTEIN: CPT | Mod: ZL | Performed by: INTERNAL MEDICINE

## 2023-08-14 PROCEDURE — 82784 ASSAY IGA/IGD/IGG/IGM EACH: CPT | Mod: ZL | Performed by: INTERNAL MEDICINE

## 2023-08-14 PROCEDURE — 80053 COMPREHEN METABOLIC PANEL: CPT | Mod: ZL,GZ | Performed by: INTERNAL MEDICINE

## 2023-08-14 PROCEDURE — 83615 LACTATE (LD) (LDH) ENZYME: CPT | Mod: ZL | Performed by: INTERNAL MEDICINE

## 2023-08-14 PROCEDURE — 99215 OFFICE O/P EST HI 40 MIN: CPT | Performed by: INTERNAL MEDICINE

## 2023-08-14 PROCEDURE — 36415 COLL VENOUS BLD VENIPUNCTURE: CPT | Mod: ZL | Performed by: INTERNAL MEDICINE

## 2023-08-14 PROCEDURE — 84165 PROTEIN E-PHORESIS SERUM: CPT | Mod: 26 | Performed by: PATHOLOGY

## 2023-08-14 PROCEDURE — G0463 HOSPITAL OUTPT CLINIC VISIT: HCPCS

## 2023-08-14 PROCEDURE — 84165 PROTEIN E-PHORESIS SERUM: CPT | Mod: ZL | Performed by: STUDENT IN AN ORGANIZED HEALTH CARE EDUCATION/TRAINING PROGRAM

## 2023-08-14 PROCEDURE — 83521 IG LIGHT CHAINS FREE EACH: CPT | Mod: ZL | Performed by: INTERNAL MEDICINE

## 2023-08-14 PROCEDURE — 84155 ASSAY OF PROTEIN SERUM: CPT | Mod: ZL,XU | Performed by: INTERNAL MEDICINE

## 2023-08-14 PROCEDURE — 85025 COMPLETE CBC W/AUTO DIFF WBC: CPT | Mod: ZL | Performed by: INTERNAL MEDICINE

## 2023-08-14 RX ORDER — HEPARIN SODIUM (PORCINE) LOCK FLUSH IV SOLN 100 UNIT/ML 100 UNIT/ML
5 SOLUTION INTRAVENOUS
Status: CANCELLED | OUTPATIENT
Start: 2023-10-10

## 2023-08-14 RX ORDER — HEPARIN SODIUM,PORCINE 10 UNIT/ML
5-20 VIAL (ML) INTRAVENOUS DAILY PRN
Status: CANCELLED | OUTPATIENT
Start: 2023-09-26

## 2023-08-14 RX ORDER — DIPHENHYDRAMINE HYDROCHLORIDE 50 MG/ML
50 INJECTION INTRAMUSCULAR; INTRAVENOUS
Status: CANCELLED
Start: 2023-08-22

## 2023-08-14 RX ORDER — HEPARIN SODIUM (PORCINE) LOCK FLUSH IV SOLN 100 UNIT/ML 100 UNIT/ML
5 SOLUTION INTRAVENOUS
Status: CANCELLED | OUTPATIENT
Start: 2023-10-03

## 2023-08-14 RX ORDER — DIPHENHYDRAMINE HYDROCHLORIDE 50 MG/ML
50 INJECTION INTRAMUSCULAR; INTRAVENOUS
Status: CANCELLED
Start: 2023-10-03

## 2023-08-14 RX ORDER — ALBUTEROL SULFATE 90 UG/1
1-2 AEROSOL, METERED RESPIRATORY (INHALATION)
Status: CANCELLED
Start: 2023-09-12

## 2023-08-14 RX ORDER — HEPARIN SODIUM,PORCINE 10 UNIT/ML
5-20 VIAL (ML) INTRAVENOUS DAILY PRN
Status: CANCELLED | OUTPATIENT
Start: 2023-10-10

## 2023-08-14 RX ORDER — DIPHENHYDRAMINE HCL 50 MG
50 CAPSULE ORAL
Status: CANCELLED | OUTPATIENT
Start: 2023-10-10

## 2023-08-14 RX ORDER — LORAZEPAM 2 MG/ML
0.5 INJECTION INTRAMUSCULAR EVERY 4 HOURS PRN
Status: CANCELLED | OUTPATIENT
Start: 2023-10-17

## 2023-08-14 RX ORDER — ACETAMINOPHEN 325 MG/1
650 TABLET ORAL
Status: CANCELLED | OUTPATIENT
Start: 2023-09-12

## 2023-08-14 RX ORDER — EPINEPHRINE 1 MG/ML
0.3 INJECTION, SOLUTION, CONCENTRATE INTRAVENOUS EVERY 5 MIN PRN
Status: CANCELLED | OUTPATIENT
Start: 2023-09-12

## 2023-08-14 RX ORDER — DEXAMETHASONE 4 MG/1
12 TABLET ORAL
Status: CANCELLED | OUTPATIENT
Start: 2023-10-17

## 2023-08-14 RX ORDER — MEPERIDINE HYDROCHLORIDE 25 MG/ML
25 INJECTION INTRAMUSCULAR; INTRAVENOUS; SUBCUTANEOUS EVERY 30 MIN PRN
Status: CANCELLED | OUTPATIENT
Start: 2023-10-03

## 2023-08-14 RX ORDER — DIPHENHYDRAMINE HYDROCHLORIDE 50 MG/ML
50 INJECTION INTRAMUSCULAR; INTRAVENOUS
Status: CANCELLED
Start: 2023-09-05

## 2023-08-14 RX ORDER — LORAZEPAM 0.5 MG/1
TABLET ORAL
COMMUNITY
Start: 2023-07-25 | End: 2023-08-23

## 2023-08-14 RX ORDER — METHYLPREDNISOLONE SODIUM SUCCINATE 125 MG/2ML
125 INJECTION, POWDER, LYOPHILIZED, FOR SOLUTION INTRAMUSCULAR; INTRAVENOUS
Status: CANCELLED
Start: 2023-08-22

## 2023-08-14 RX ORDER — METHYLPREDNISOLONE SODIUM SUCCINATE 125 MG/2ML
125 INJECTION, POWDER, LYOPHILIZED, FOR SOLUTION INTRAMUSCULAR; INTRAVENOUS
Status: CANCELLED
Start: 2023-08-29

## 2023-08-14 RX ORDER — ALBUTEROL SULFATE 0.83 MG/ML
2.5 SOLUTION RESPIRATORY (INHALATION)
Status: CANCELLED | OUTPATIENT
Start: 2023-09-26

## 2023-08-14 RX ORDER — MONTELUKAST SODIUM 10 MG/1
10 TABLET ORAL ONCE
Status: CANCELLED | OUTPATIENT
Start: 2023-08-29

## 2023-08-14 RX ORDER — MEPERIDINE HYDROCHLORIDE 25 MG/ML
25 INJECTION INTRAMUSCULAR; INTRAVENOUS; SUBCUTANEOUS EVERY 30 MIN PRN
Status: CANCELLED | OUTPATIENT
Start: 2023-09-12

## 2023-08-14 RX ORDER — EPINEPHRINE 1 MG/ML
0.3 INJECTION, SOLUTION, CONCENTRATE INTRAVENOUS EVERY 5 MIN PRN
Status: CANCELLED | OUTPATIENT
Start: 2023-08-29

## 2023-08-14 RX ORDER — HEPARIN SODIUM (PORCINE) LOCK FLUSH IV SOLN 100 UNIT/ML 100 UNIT/ML
5 SOLUTION INTRAVENOUS
Status: CANCELLED | OUTPATIENT
Start: 2023-10-17

## 2023-08-14 RX ORDER — METHYLPREDNISOLONE SODIUM SUCCINATE 125 MG/2ML
125 INJECTION, POWDER, LYOPHILIZED, FOR SOLUTION INTRAMUSCULAR; INTRAVENOUS
Status: CANCELLED
Start: 2023-09-12

## 2023-08-14 RX ORDER — ALBUTEROL SULFATE 0.83 MG/ML
2.5 SOLUTION RESPIRATORY (INHALATION)
Status: CANCELLED | OUTPATIENT
Start: 2023-10-17

## 2023-08-14 RX ORDER — HEPARIN SODIUM,PORCINE 10 UNIT/ML
5-20 VIAL (ML) INTRAVENOUS DAILY PRN
Status: CANCELLED | OUTPATIENT
Start: 2023-09-12

## 2023-08-14 RX ORDER — EPINEPHRINE 1 MG/ML
0.3 INJECTION, SOLUTION, CONCENTRATE INTRAVENOUS EVERY 5 MIN PRN
Status: CANCELLED | OUTPATIENT
Start: 2023-10-03

## 2023-08-14 RX ORDER — EPINEPHRINE 1 MG/ML
0.3 INJECTION, SOLUTION, CONCENTRATE INTRAVENOUS EVERY 5 MIN PRN
Status: CANCELLED | OUTPATIENT
Start: 2023-09-05

## 2023-08-14 RX ORDER — ALBUTEROL SULFATE 0.83 MG/ML
2.5 SOLUTION RESPIRATORY (INHALATION)
Status: CANCELLED | OUTPATIENT
Start: 2023-09-12

## 2023-08-14 RX ORDER — DIPHENHYDRAMINE HYDROCHLORIDE 50 MG/ML
50 INJECTION INTRAMUSCULAR; INTRAVENOUS
Status: CANCELLED
Start: 2023-08-29

## 2023-08-14 RX ORDER — HEPARIN SODIUM,PORCINE 10 UNIT/ML
5-20 VIAL (ML) INTRAVENOUS DAILY PRN
Status: CANCELLED | OUTPATIENT
Start: 2023-10-03

## 2023-08-14 RX ORDER — DEXAMETHASONE 4 MG/1
20 TABLET ORAL ONCE
Status: CANCELLED | OUTPATIENT
Start: 2023-08-22

## 2023-08-14 RX ORDER — MONTELUKAST SODIUM 10 MG/1
10 TABLET ORAL ONCE
Status: CANCELLED | OUTPATIENT
Start: 2023-08-22

## 2023-08-14 RX ORDER — ALBUTEROL SULFATE 90 UG/1
1-2 AEROSOL, METERED RESPIRATORY (INHALATION)
Status: CANCELLED
Start: 2023-08-22

## 2023-08-14 RX ORDER — HEPARIN SODIUM (PORCINE) LOCK FLUSH IV SOLN 100 UNIT/ML 100 UNIT/ML
5 SOLUTION INTRAVENOUS
Status: CANCELLED | OUTPATIENT
Start: 2023-09-26

## 2023-08-14 RX ORDER — MEPERIDINE HYDROCHLORIDE 25 MG/ML
25 INJECTION INTRAMUSCULAR; INTRAVENOUS; SUBCUTANEOUS EVERY 30 MIN PRN
Status: CANCELLED | OUTPATIENT
Start: 2023-10-10

## 2023-08-14 RX ORDER — DEXAMETHASONE 4 MG/1
12 TABLET ORAL ONCE
Status: CANCELLED | OUTPATIENT
Start: 2023-09-05

## 2023-08-14 RX ORDER — DIPHENHYDRAMINE HYDROCHLORIDE 50 MG/ML
50 INJECTION INTRAMUSCULAR; INTRAVENOUS
Status: CANCELLED
Start: 2023-09-12

## 2023-08-14 RX ORDER — ACETAMINOPHEN 325 MG/1
650 TABLET ORAL ONCE
Status: CANCELLED | OUTPATIENT
Start: 2023-09-05

## 2023-08-14 RX ORDER — DEXAMETHASONE 4 MG/1
20 TABLET ORAL ONCE
Status: CANCELLED | OUTPATIENT
Start: 2023-08-29

## 2023-08-14 RX ORDER — ACETAMINOPHEN 325 MG/1
650 TABLET ORAL
Status: CANCELLED | OUTPATIENT
Start: 2023-10-10

## 2023-08-14 RX ORDER — ALBUTEROL SULFATE 0.83 MG/ML
2.5 SOLUTION RESPIRATORY (INHALATION)
Status: CANCELLED | OUTPATIENT
Start: 2023-09-05

## 2023-08-14 RX ORDER — EPINEPHRINE 1 MG/ML
0.3 INJECTION, SOLUTION, CONCENTRATE INTRAVENOUS EVERY 5 MIN PRN
Status: CANCELLED | OUTPATIENT
Start: 2023-08-22

## 2023-08-14 RX ORDER — LORAZEPAM 2 MG/ML
0.5 INJECTION INTRAMUSCULAR EVERY 4 HOURS PRN
Status: CANCELLED | OUTPATIENT
Start: 2023-08-29

## 2023-08-14 RX ORDER — ACETAMINOPHEN 325 MG/1
650 TABLET ORAL
Status: CANCELLED | OUTPATIENT
Start: 2023-10-17

## 2023-08-14 RX ORDER — ALBUTEROL SULFATE 90 UG/1
1-2 AEROSOL, METERED RESPIRATORY (INHALATION)
Status: CANCELLED
Start: 2023-08-29

## 2023-08-14 RX ORDER — ALBUTEROL SULFATE 90 UG/1
1-2 AEROSOL, METERED RESPIRATORY (INHALATION)
Status: CANCELLED
Start: 2023-09-26

## 2023-08-14 RX ORDER — DIPHENHYDRAMINE HCL 50 MG
50 CAPSULE ORAL
Status: CANCELLED | OUTPATIENT
Start: 2023-10-03

## 2023-08-14 RX ORDER — HEPARIN SODIUM,PORCINE 10 UNIT/ML
5-20 VIAL (ML) INTRAVENOUS DAILY PRN
Status: CANCELLED | OUTPATIENT
Start: 2023-08-29

## 2023-08-14 RX ORDER — DEXAMETHASONE 4 MG/1
12 TABLET ORAL
Status: CANCELLED | OUTPATIENT
Start: 2023-09-12

## 2023-08-14 RX ORDER — DEXAMETHASONE 4 MG/1
12 TABLET ORAL
Status: CANCELLED | OUTPATIENT
Start: 2023-10-10

## 2023-08-14 RX ORDER — ALBUTEROL SULFATE 90 UG/1
1-2 AEROSOL, METERED RESPIRATORY (INHALATION)
Status: CANCELLED
Start: 2023-10-10

## 2023-08-14 RX ORDER — ACETAMINOPHEN 325 MG/1
650 TABLET ORAL
Status: CANCELLED | OUTPATIENT
Start: 2023-09-26

## 2023-08-14 RX ORDER — ALBUTEROL SULFATE 90 UG/1
1-2 AEROSOL, METERED RESPIRATORY (INHALATION)
Status: CANCELLED
Start: 2023-09-05

## 2023-08-14 RX ORDER — ALBUTEROL SULFATE 0.83 MG/ML
2.5 SOLUTION RESPIRATORY (INHALATION)
Status: CANCELLED | OUTPATIENT
Start: 2023-08-29

## 2023-08-14 RX ORDER — LORAZEPAM 2 MG/ML
0.5 INJECTION INTRAMUSCULAR EVERY 4 HOURS PRN
Status: CANCELLED | OUTPATIENT
Start: 2023-09-05

## 2023-08-14 RX ORDER — LORAZEPAM 2 MG/ML
0.5 INJECTION INTRAMUSCULAR EVERY 4 HOURS PRN
Status: CANCELLED | OUTPATIENT
Start: 2023-10-03

## 2023-08-14 RX ORDER — DIPHENHYDRAMINE HCL 50 MG
50 CAPSULE ORAL
Status: CANCELLED | OUTPATIENT
Start: 2023-09-12

## 2023-08-14 RX ORDER — HEPARIN SODIUM (PORCINE) LOCK FLUSH IV SOLN 100 UNIT/ML 100 UNIT/ML
5 SOLUTION INTRAVENOUS
Status: CANCELLED | OUTPATIENT
Start: 2023-09-05

## 2023-08-14 RX ORDER — METHYLPREDNISOLONE SODIUM SUCCINATE 125 MG/2ML
125 INJECTION, POWDER, LYOPHILIZED, FOR SOLUTION INTRAMUSCULAR; INTRAVENOUS
Status: CANCELLED
Start: 2023-10-17

## 2023-08-14 RX ORDER — DIPHENHYDRAMINE HCL 50 MG
50 CAPSULE ORAL
Status: CANCELLED | OUTPATIENT
Start: 2023-10-17

## 2023-08-14 RX ORDER — DIPHENHYDRAMINE HYDROCHLORIDE 50 MG/ML
50 INJECTION INTRAMUSCULAR; INTRAVENOUS
Status: CANCELLED
Start: 2023-09-26

## 2023-08-14 RX ORDER — MONTELUKAST SODIUM 10 MG/1
10 TABLET ORAL ONCE
Status: CANCELLED | OUTPATIENT
Start: 2023-09-05

## 2023-08-14 RX ORDER — DIPHENHYDRAMINE HCL 50 MG
50 CAPSULE ORAL ONCE
Status: CANCELLED | OUTPATIENT
Start: 2023-08-29

## 2023-08-14 RX ORDER — MEPERIDINE HYDROCHLORIDE 25 MG/ML
25 INJECTION INTRAMUSCULAR; INTRAVENOUS; SUBCUTANEOUS EVERY 30 MIN PRN
Status: CANCELLED | OUTPATIENT
Start: 2023-10-17

## 2023-08-14 RX ORDER — ACETAMINOPHEN 325 MG/1
650 TABLET ORAL
Status: CANCELLED | OUTPATIENT
Start: 2023-10-03

## 2023-08-14 RX ORDER — DIPHENHYDRAMINE HCL 50 MG
50 CAPSULE ORAL ONCE
Status: CANCELLED | OUTPATIENT
Start: 2023-09-05

## 2023-08-14 RX ORDER — ACYCLOVIR 400 MG/1
400 TABLET ORAL 2 TIMES DAILY
Qty: 60 TABLET | Refills: 11 | Status: SHIPPED | OUTPATIENT
Start: 2023-08-20 | End: 2024-04-01

## 2023-08-14 RX ORDER — LORAZEPAM 2 MG/ML
0.5 INJECTION INTRAMUSCULAR EVERY 4 HOURS PRN
Status: CANCELLED | OUTPATIENT
Start: 2023-09-12

## 2023-08-14 RX ORDER — HEPARIN SODIUM,PORCINE 10 UNIT/ML
5-20 VIAL (ML) INTRAVENOUS DAILY PRN
Status: CANCELLED | OUTPATIENT
Start: 2023-09-05

## 2023-08-14 RX ORDER — METHYLPREDNISOLONE SODIUM SUCCINATE 125 MG/2ML
125 INJECTION, POWDER, LYOPHILIZED, FOR SOLUTION INTRAMUSCULAR; INTRAVENOUS
Status: CANCELLED
Start: 2023-10-03

## 2023-08-14 RX ORDER — EPINEPHRINE 1 MG/ML
0.3 INJECTION, SOLUTION, CONCENTRATE INTRAVENOUS EVERY 5 MIN PRN
Status: CANCELLED | OUTPATIENT
Start: 2023-10-17

## 2023-08-14 RX ORDER — METHYLPREDNISOLONE SODIUM SUCCINATE 125 MG/2ML
125 INJECTION, POWDER, LYOPHILIZED, FOR SOLUTION INTRAMUSCULAR; INTRAVENOUS
Status: CANCELLED
Start: 2023-09-05

## 2023-08-14 RX ORDER — HEPARIN SODIUM (PORCINE) LOCK FLUSH IV SOLN 100 UNIT/ML 100 UNIT/ML
5 SOLUTION INTRAVENOUS
Status: CANCELLED | OUTPATIENT
Start: 2023-08-29

## 2023-08-14 RX ORDER — HEPARIN SODIUM (PORCINE) LOCK FLUSH IV SOLN 100 UNIT/ML 100 UNIT/ML
5 SOLUTION INTRAVENOUS
Status: CANCELLED | OUTPATIENT
Start: 2023-09-12

## 2023-08-14 RX ORDER — ACETAMINOPHEN 325 MG/1
650 TABLET ORAL ONCE
Status: CANCELLED | OUTPATIENT
Start: 2023-08-22

## 2023-08-14 RX ORDER — MEPERIDINE HYDROCHLORIDE 25 MG/ML
25 INJECTION INTRAMUSCULAR; INTRAVENOUS; SUBCUTANEOUS EVERY 30 MIN PRN
Status: CANCELLED | OUTPATIENT
Start: 2023-09-26

## 2023-08-14 RX ORDER — ALBUTEROL SULFATE 0.83 MG/ML
2.5 SOLUTION RESPIRATORY (INHALATION)
Status: CANCELLED | OUTPATIENT
Start: 2023-08-22

## 2023-08-14 RX ORDER — MEPERIDINE HYDROCHLORIDE 25 MG/ML
25 INJECTION INTRAMUSCULAR; INTRAVENOUS; SUBCUTANEOUS EVERY 30 MIN PRN
Status: CANCELLED | OUTPATIENT
Start: 2023-09-05

## 2023-08-14 RX ORDER — MEPERIDINE HYDROCHLORIDE 25 MG/ML
25 INJECTION INTRAMUSCULAR; INTRAVENOUS; SUBCUTANEOUS EVERY 30 MIN PRN
Status: CANCELLED | OUTPATIENT
Start: 2023-08-29

## 2023-08-14 RX ORDER — METHYLPREDNISOLONE SODIUM SUCCINATE 125 MG/2ML
125 INJECTION, POWDER, LYOPHILIZED, FOR SOLUTION INTRAMUSCULAR; INTRAVENOUS
Status: CANCELLED
Start: 2023-09-26

## 2023-08-14 RX ORDER — HEPARIN SODIUM (PORCINE) LOCK FLUSH IV SOLN 100 UNIT/ML 100 UNIT/ML
5 SOLUTION INTRAVENOUS
Status: CANCELLED | OUTPATIENT
Start: 2023-08-22

## 2023-08-14 RX ORDER — ALBUTEROL SULFATE 0.83 MG/ML
2.5 SOLUTION RESPIRATORY (INHALATION)
Status: CANCELLED | OUTPATIENT
Start: 2023-10-10

## 2023-08-14 RX ORDER — EPINEPHRINE 1 MG/ML
0.3 INJECTION, SOLUTION, CONCENTRATE INTRAVENOUS EVERY 5 MIN PRN
Status: CANCELLED | OUTPATIENT
Start: 2023-10-10

## 2023-08-14 RX ORDER — DEXAMETHASONE 4 MG/1
12 TABLET ORAL
Status: CANCELLED | OUTPATIENT
Start: 2023-09-26

## 2023-08-14 RX ORDER — ALBUTEROL SULFATE 0.83 MG/ML
2.5 SOLUTION RESPIRATORY (INHALATION)
Status: CANCELLED | OUTPATIENT
Start: 2023-10-03

## 2023-08-14 RX ORDER — MEPERIDINE HYDROCHLORIDE 25 MG/ML
25 INJECTION INTRAMUSCULAR; INTRAVENOUS; SUBCUTANEOUS EVERY 30 MIN PRN
Status: CANCELLED | OUTPATIENT
Start: 2023-08-22

## 2023-08-14 RX ORDER — DIPHENHYDRAMINE HYDROCHLORIDE 50 MG/ML
50 INJECTION INTRAMUSCULAR; INTRAVENOUS
Status: CANCELLED
Start: 2023-10-10

## 2023-08-14 RX ORDER — ACETAMINOPHEN 325 MG/1
650 TABLET ORAL ONCE
Status: CANCELLED | OUTPATIENT
Start: 2023-08-29

## 2023-08-14 RX ORDER — DEXAMETHASONE 4 MG/1
12 TABLET ORAL
Status: CANCELLED | OUTPATIENT
Start: 2023-10-03

## 2023-08-14 RX ORDER — HEPARIN SODIUM,PORCINE 10 UNIT/ML
5-20 VIAL (ML) INTRAVENOUS DAILY PRN
Status: CANCELLED | OUTPATIENT
Start: 2023-08-22

## 2023-08-14 RX ORDER — LORAZEPAM 2 MG/ML
0.5 INJECTION INTRAMUSCULAR EVERY 4 HOURS PRN
Status: CANCELLED | OUTPATIENT
Start: 2023-09-26

## 2023-08-14 RX ORDER — ALBUTEROL SULFATE 90 UG/1
1-2 AEROSOL, METERED RESPIRATORY (INHALATION)
Status: CANCELLED
Start: 2023-10-03

## 2023-08-14 RX ORDER — HEPARIN SODIUM,PORCINE 10 UNIT/ML
5-20 VIAL (ML) INTRAVENOUS DAILY PRN
Status: CANCELLED | OUTPATIENT
Start: 2023-10-17

## 2023-08-14 RX ORDER — ALBUTEROL SULFATE 90 UG/1
1-2 AEROSOL, METERED RESPIRATORY (INHALATION)
Status: CANCELLED
Start: 2023-10-17

## 2023-08-14 RX ORDER — DIPHENHYDRAMINE HYDROCHLORIDE 50 MG/ML
50 INJECTION INTRAMUSCULAR; INTRAVENOUS
Status: CANCELLED
Start: 2023-10-17

## 2023-08-14 RX ORDER — METHYLPREDNISOLONE SODIUM SUCCINATE 125 MG/2ML
125 INJECTION, POWDER, LYOPHILIZED, FOR SOLUTION INTRAMUSCULAR; INTRAVENOUS
Status: CANCELLED
Start: 2023-10-10

## 2023-08-14 RX ORDER — DIPHENHYDRAMINE HCL 50 MG
50 CAPSULE ORAL
Status: CANCELLED | OUTPATIENT
Start: 2023-09-26

## 2023-08-14 RX ORDER — LORAZEPAM 2 MG/ML
0.5 INJECTION INTRAMUSCULAR EVERY 4 HOURS PRN
Status: CANCELLED | OUTPATIENT
Start: 2023-08-22

## 2023-08-14 RX ORDER — DIPHENHYDRAMINE HCL 50 MG
50 CAPSULE ORAL ONCE
Status: CANCELLED | OUTPATIENT
Start: 2023-08-22

## 2023-08-14 RX ORDER — EPINEPHRINE 1 MG/ML
0.3 INJECTION, SOLUTION, CONCENTRATE INTRAVENOUS EVERY 5 MIN PRN
Status: CANCELLED | OUTPATIENT
Start: 2023-09-26

## 2023-08-14 RX ORDER — LORAZEPAM 2 MG/ML
0.5 INJECTION INTRAMUSCULAR EVERY 4 HOURS PRN
Status: CANCELLED | OUTPATIENT
Start: 2023-10-10

## 2023-08-14 ASSESSMENT — PAIN SCALES - GENERAL: PAINLEVEL: NO PAIN (0)

## 2023-08-14 NOTE — NURSING NOTE
"Oncology Rooming Note    August 14, 2023 3:23 PM   Carmelita Watts is a 72 year old female who presents for:    Chief Complaint   Patient presents with    Oncology Clinic Visit     Follow up Multiple Myeloma     Initial Vitals: /80   Pulse 96   Temp 98.8  F (37.1  C) (Tympanic)   Resp 20   Ht 1.6 m (5' 3\")   Wt 77.2 kg (170 lb 3.1 oz)   SpO2 94%   BMI 30.15 kg/m   Estimated body mass index is 30.15 kg/m  as calculated from the following:    Height as of this encounter: 1.6 m (5' 3\").    Weight as of this encounter: 77.2 kg (170 lb 3.1 oz). Body surface area is 1.85 meters squared.  No Pain (0) Comment: Data Unavailable   No LMP recorded. Patient is postmenopausal.  Allergies reviewed: Yes  Medications reviewed: Yes    Medications: Medication refills not needed today.  Pharmacy name entered into [facility]:    [id number] - [place] - [address]  AT [facility] [address]  [facility] [place] - [address] [facility] - [place] - [address]  [facility] AND [facility]          Vilma Chi LPN             "

## 2023-08-15 LAB — TOTAL PROTEIN SERUM FOR ELP: 6.8 G/DL (ref 6.4–8.3)

## 2023-08-15 NOTE — PROGRESS NOTES
Visit Date: 08/14/2023    HEMATOLOGY ONCOLOGY CLINIC NOTE     HISTORY OF PRESENT ILLNESS:  The patient returns for followup of IgA multiple myeloma.  For details, see previous notes.  The patient had been initiated on the CyBorD regimen.  After she had progressed on Velcade and Decadron, she was started on CyBorD with Velcade given at a dose of 1.5 mg/m2 weekly on days 1, 4, 8, 15 and 22 of a 28-day regimen. Cytoxan was given at a dose 140 mg/m2 weekly or 200 mg weekly on days 1, 4, 8, 15, and 22.  She received reduced doses of Cytoxan based on immunosuppressive state.  Also was continued on Zovirax prophylaxis.  Dexamethasone was given 40 mg weekly on days 1, 4, 8, 15 and 22.  After 1 cycle, her M spike had dropped as well as her IgG level.  The patient completed 2 cycles CyBorD with Cytoxan being dosed reduced to 150 mg every 7 days.  Nonetheless, her M spike dropped from 6.2 down to 1.2.  She completed 35 cycles and was followed by Bonita Quintana, nurse practitioner.  Subsequently, M spike remained 1.  It was elected to initiate Darzalex or Faspro 1800 mg subcutaneously per protocol.  Subsequently, on 03/23/2021, her M spike had increased from 1 to 1.2 and it was decided to add Velcade and dexamethasone to the plan. Started on Darzalex 1800 mg subcutaneously as per protocol and Velcade 1.3 mg/m2, dexamethasone given on days 1, 4, 8 and 11 every 21 days.  Zometa was given 4 mg every 3 months.  M-spike did drop after cycle 9 to 0.6 and after cycle 12 it dropped to 0.4.  She received a total of 26 cycles of daratumumab, Velcade and dex and slight drop to 0.3. In 04/2022, it was decided to put the patient on treatment holiday.  She received Evusheld on 05/27/2022 while on treatment holiday.  She was recently seen by Nida Eastman. It was noted that her M spike had increased from 0.4 to 0.8 within 2 months. Given the fact that this was evidence of biochemical recurrence, we wanted to rule out end-organ damage by  obtaining a PET scan.  This was done on 08/02/2022 and it was read as a negative study.  There was no suspicious hypermetabolism present.  There was no evidence of mass or lymphadenopathy.  No concerning hypermetabolism was present.  There was a well-defined  region with osteolysis involving the left anterior vertebral body of L3.  This was consistent with fat contact.  There was no associated with hypermetabolism.  The patient comes in today.  She is doing relatively well.  She has some occasional fatigue, but she has been working in the garden doing walking 1-1.5 miles per day.  Overall, she may have some mild neuropathic symptoms in her feet.  Otherwise, no fevers, night sweats, weight loss, abdominal pain, headaches.    PHYSICAL EXAMINATION:  GENERAL:  She is a well-developed, well-nourished elderly white female in no acute distress.  ECOG performance status is 0.  VITAL SIGNS:  Stable.  She is afebrile.  HEENT:  Atraumatic, normocephalic.  Oropharynx nonerythematous.  NECK:  Supple.  LUNGS:  Clear to auscultation and percussion.  HEART:  Regular rhythm.  S1, S2 normal.  ABDOMEN:  Soft, normoactive bowel sounds, nondistended.  LYMPHATICS:  No cervical, supraclavicular, axillary nodes.  EXTREMITIES:  No edema.  NEUROLOGIC:  Nonfocal.    LABORATORY DATA:  CBC from today reveals white count 6.0, H and H 13.5, 40.1, platelet count is 227.  BUN 13.3, creatinine 0.81, glucose 109, alkaline phosphatase 139.  Myeloma labs from 07/11/2023 reveal serum protein electrophoresis with an M-spike of 0.8.  Three months prior it was 0.4.  IgA level was 1327.  Kappa lambda ratio is 1.87.  Beta 2 microglobulin is 2.9.    IMPRESSION:  IgA kappa multiple myeloma with 80% involvement of the bone marrow. IPSS staging was 2A.  Elevated kappa lambda ratio standard risk cytogenetics. Deemed a candidate for Velcade, Revlimid and Decadron.  The patient could not afford Revlimid. Therefore, she was started on Velcade and Decadron.  She  refused stem cell transplant evaluation in consultation at the .  She received 2 cycles, with a positive drop in M protein.  Subsequently, she developed a rise in M protein, rising kappa lambda ratio. Was initiated on CyBorD regimen in 10/2019.  She had a positive response after 1 cycle. Her M spike dropped from 6.2, down to 3.8.  After 5 cycles, it dropped to 1.2 and remained stable for 36 cycles.  She was switched to daratumumab subcutaneously.  After 4 cycles, 1 to 1.2, we added Velcade and Decadron.  She went on to complete 22 cycles. M spike dropped to 0.2.  It was decided to proceed with a treatment holiday in 2022.  She has been off all therapy since then.  Now with evidence of biochemical recurrence, but no evidence of end-organ damage.  PET scan was negative.  She has no evidence of anemia, renal failure or hypercalcemia.  Therefore, based on studies that have been done in patients who have biochemical recurrence as monotherapy is very active.  Therefore, we will start patient on subcutaneous daratumumab 1800 mg days 1, 8 14, 22, for 23 cycles and monitor myeloma labs monthly.  Otherwise, we will see the patient after 1 month of therapy.  We will monitor M spike.    TIME SPENT:  Eighty minutes was spent on this patient.  Time was spent reviewing literature on treatment of multiple myeloma, reviewing multiple physician provider notes, lab results, performing history and physical, documenting history and physical, ordering daratumumab and ordering followup labs.    Elías Walker MD        D: 2023   T: 2023   MT: kalani    Name:     WILLIAM WILDER  MRN:      [id number]        Account:    [id number]   :      1950           Visit Date: 2023     Document: [id number]    cc:  VENKATESH Vázquez

## 2023-08-16 LAB
ALBUMIN SERPL ELPH-MCNC: 4 G/DL (ref 3.7–5.1)
ALPHA1 GLOB SERPL ELPH-MCNC: 0.3 G/DL (ref 0.2–0.4)
ALPHA2 GLOB SERPL ELPH-MCNC: 0.7 G/DL (ref 0.5–0.9)
B-GLOBULIN SERPL ELPH-MCNC: 0.6 G/DL (ref 0.6–1)
B2 MICROGLOB TUMOR MARKER SER-MCNC: 2.8 MG/L
GAMMA GLOB SERPL ELPH-MCNC: 1.2 G/DL (ref 0.7–1.6)
IGA SERPL-MCNC: 30 MG/DL (ref 84–499)
IGG SERPL-MCNC: 1363 MG/DL (ref 610–1616)
IGM SERPL-MCNC: 67 MG/DL (ref 35–242)
KAPPA LC FREE SER-MCNC: 0.78 MG/DL (ref 0.33–1.94)
KAPPA LC FREE/LAMBDA FREE SER NEPH: 1.86 {RATIO} (ref 0.26–1.65)
LAMBDA LC FREE SERPL-MCNC: 0.42 MG/DL (ref 0.57–2.63)
M PROTEIN SERPL ELPH-MCNC: 0.8 G/DL
PROT PATTERN SERPL ELPH-IMP: ABNORMAL
PROT PATTERN SERPL IFE-IMP: NORMAL
VISC SER: 1.5 CP (ref 1.4–1.8)

## 2023-08-22 ENCOUNTER — INFUSION THERAPY VISIT (OUTPATIENT)
Dept: INFUSION THERAPY | Facility: OTHER | Age: 73
End: 2023-08-22
Payer: MEDICARE

## 2023-08-22 VITALS
TEMPERATURE: 98.5 F | RESPIRATION RATE: 16 BRPM | DIASTOLIC BLOOD PRESSURE: 68 MMHG | BODY MASS INDEX: 31.16 KG/M2 | HEIGHT: 62 IN | HEART RATE: 69 BPM | WEIGHT: 169.31 LBS | SYSTOLIC BLOOD PRESSURE: 119 MMHG | OXYGEN SATURATION: 94 %

## 2023-08-22 DIAGNOSIS — E11.00 TYPE 2 DIABETES MELLITUS WITH HYPEROSMOLARITY WITHOUT COMA, WITHOUT LONG-TERM CURRENT USE OF INSULIN (H): Primary | ICD-10-CM

## 2023-08-22 DIAGNOSIS — C90.00 MULTIPLE MYELOMA NOT HAVING ACHIEVED REMISSION (H): Primary | ICD-10-CM

## 2023-08-22 LAB
ABO/RH(D): NORMAL
ANTIBODY SCREEN: NEGATIVE
BASOPHILS # BLD AUTO: 0 10E3/UL (ref 0–0.2)
BASOPHILS NFR BLD AUTO: 1 %
EOSINOPHIL # BLD AUTO: 0.3 10E3/UL (ref 0–0.7)
EOSINOPHIL NFR BLD AUTO: 5 %
ERYTHROCYTE [DISTWIDTH] IN BLOOD BY AUTOMATED COUNT: 13.7 % (ref 10–15)
HCT VFR BLD AUTO: 41 % (ref 35–47)
HGB BLD-MCNC: 13.9 G/DL (ref 11.7–15.7)
IMM GRANULOCYTES # BLD: 0 10E3/UL
IMM GRANULOCYTES NFR BLD: 1 %
LYMPHOCYTES # BLD AUTO: 2.2 10E3/UL (ref 0.8–5.3)
LYMPHOCYTES NFR BLD AUTO: 39 %
MCH RBC QN AUTO: 30.6 PG (ref 26.5–33)
MCHC RBC AUTO-ENTMCNC: 33.9 G/DL (ref 31.5–36.5)
MCV RBC AUTO: 90 FL (ref 78–100)
MONOCYTES # BLD AUTO: 0.6 10E3/UL (ref 0–1.3)
MONOCYTES NFR BLD AUTO: 11 %
NEUTROPHILS # BLD AUTO: 2.5 10E3/UL (ref 1.6–8.3)
NEUTROPHILS NFR BLD AUTO: 43 %
NRBC # BLD AUTO: 0 10E3/UL
NRBC BLD AUTO-RTO: 0 /100
PLATELET # BLD AUTO: 224 10E3/UL (ref 150–450)
RBC # BLD AUTO: 4.54 10E6/UL (ref 3.8–5.2)
SPECIMEN EXPIRATION DATE: NORMAL
WBC # BLD AUTO: 5.7 10E3/UL (ref 4–11)

## 2023-08-22 PROCEDURE — 96401 CHEMO ANTI-NEOPL SQ/IM: CPT

## 2023-08-22 PROCEDURE — 250N000011 HC RX IP 250 OP 636: Mod: JZ | Performed by: INTERNAL MEDICINE

## 2023-08-22 PROCEDURE — 86850 RBC ANTIBODY SCREEN: CPT | Performed by: INTERNAL MEDICINE

## 2023-08-22 PROCEDURE — 36415 COLL VENOUS BLD VENIPUNCTURE: CPT | Mod: ZL | Performed by: INTERNAL MEDICINE

## 2023-08-22 PROCEDURE — 86901 BLOOD TYPING SEROLOGIC RH(D): CPT | Performed by: INTERNAL MEDICINE

## 2023-08-22 PROCEDURE — 85025 COMPLETE CBC W/AUTO DIFF WBC: CPT | Mod: ZL | Performed by: INTERNAL MEDICINE

## 2023-08-22 RX ORDER — MONTELUKAST SODIUM 10 MG/1
10 TABLET ORAL ONCE
Status: COMPLETED | OUTPATIENT
Start: 2023-08-22 | End: 2023-08-22

## 2023-08-22 RX ORDER — ACETAMINOPHEN 325 MG/1
650 TABLET ORAL ONCE
Status: COMPLETED | OUTPATIENT
Start: 2023-08-22 | End: 2023-08-22

## 2023-08-22 RX ORDER — DIPHENHYDRAMINE HCL 50 MG
50 CAPSULE ORAL ONCE
Status: COMPLETED | OUTPATIENT
Start: 2023-08-22 | End: 2023-08-22

## 2023-08-22 RX ORDER — DEXAMETHASONE 4 MG/1
20 TABLET ORAL ONCE
Status: COMPLETED | OUTPATIENT
Start: 2023-08-22 | End: 2023-08-22

## 2023-08-22 RX ADMIN — Medication 250 ML: at 09:37

## 2023-08-22 RX ADMIN — DEXAMETHASONE 20 MG: 4 TABLET ORAL at 09:37

## 2023-08-22 RX ADMIN — MONTELUKAST SODIUM 10 MG: 10 TABLET ORAL at 09:37

## 2023-08-22 RX ADMIN — ACETAMINOPHEN 650 MG: 325 TABLET ORAL at 09:37

## 2023-08-22 RX ADMIN — DARATUMUMAB AND HYALURONIDASE-FIHJ (HUMAN RECOMBINANT) 1800 MG: 1800; 30000 INJECTION SUBCUTANEOUS at 10:15

## 2023-08-22 RX ADMIN — Medication 50 MG: at 09:37

## 2023-08-22 ASSESSMENT — PAIN SCALES - GENERAL: PAINLEVEL: NO PAIN (0)

## 2023-08-22 NOTE — PROGRESS NOTES
PIV placed per orders, labs taken from IV. See flowsheet.     Patient meets parameters for today's treatment. Reviewed orders/schedule for resuming FASPRO. Patient verbalizes understanding of all, denies needs/concerns this date.     The following medication was given:     MEDICATION: FASPRO  ROUTE: SQ  SITE: RUQ - Abd.    During monitoring period, patient asked to walk about facility, while staying on our floor. Education re risks of walking about the med-surg portion of facility but she notes she can not sit still and would still like to walk about. Education again on risk of reaction, when to report any changes, and demonstrated how to re-enter unit when ready. Patient returned every 15-20 min throughout monitoring period to check in with staff.      Hand off of care to Margaret GUSMAN for vitals post monitoring period, PIV removal and discharge.

## 2023-08-23 ENCOUNTER — DOCUMENTATION ONLY (OUTPATIENT)
Dept: ONCOLOGY | Facility: OTHER | Age: 73
End: 2023-08-23

## 2023-08-23 ENCOUNTER — OFFICE VISIT (OUTPATIENT)
Dept: FAMILY MEDICINE | Facility: OTHER | Age: 73
End: 2023-08-23
Attending: PHYSICIAN ASSISTANT
Payer: COMMERCIAL

## 2023-08-23 VITALS
HEART RATE: 65 BPM | DIASTOLIC BLOOD PRESSURE: 88 MMHG | RESPIRATION RATE: 16 BRPM | BODY MASS INDEX: 31.41 KG/M2 | WEIGHT: 172.2 LBS | SYSTOLIC BLOOD PRESSURE: 122 MMHG | OXYGEN SATURATION: 97 % | TEMPERATURE: 96.8 F

## 2023-08-23 DIAGNOSIS — E11.00 TYPE 2 DIABETES MELLITUS WITH HYPEROSMOLARITY WITHOUT COMA, WITHOUT LONG-TERM CURRENT USE OF INSULIN (H): Primary | ICD-10-CM

## 2023-08-23 DIAGNOSIS — I10 ESSENTIAL HYPERTENSION WITH GOAL BLOOD PRESSURE LESS THAN 140/90: ICD-10-CM

## 2023-08-23 DIAGNOSIS — C90.00 MULTIPLE MYELOMA NOT HAVING ACHIEVED REMISSION (H): ICD-10-CM

## 2023-08-23 DIAGNOSIS — E78.2 MIXED HYPERLIPIDEMIA: ICD-10-CM

## 2023-08-23 PROCEDURE — 99214 OFFICE O/P EST MOD 30 MIN: CPT | Performed by: PHYSICIAN ASSISTANT

## 2023-08-23 PROCEDURE — G0463 HOSPITAL OUTPT CLINIC VISIT: HCPCS

## 2023-08-23 RX ORDER — ATORVASTATIN CALCIUM 20 MG/1
20 TABLET, FILM COATED ORAL DAILY
Qty: 90 TABLET | Refills: 3 | Status: SHIPPED | OUTPATIENT
Start: 2023-08-23 | End: 2024-09-04

## 2023-08-23 ASSESSMENT — PAIN SCALES - GENERAL: PAINLEVEL: NO PAIN (0)

## 2023-08-23 ASSESSMENT — ANXIETY QUESTIONNAIRES
4. TROUBLE RELAXING: NOT AT ALL
3. WORRYING TOO MUCH ABOUT DIFFERENT THINGS: NOT AT ALL
2. NOT BEING ABLE TO STOP OR CONTROL WORRYING: NOT AT ALL
1. FEELING NERVOUS, ANXIOUS, OR ON EDGE: SEVERAL DAYS
6. BECOMING EASILY ANNOYED OR IRRITABLE: NOT AT ALL
5. BEING SO RESTLESS THAT IT IS HARD TO SIT STILL: NOT AT ALL
7. FEELING AFRAID AS IF SOMETHING AWFUL MIGHT HAPPEN: NOT AT ALL
GAD7 TOTAL SCORE: 1
GAD7 TOTAL SCORE: 1
IF YOU CHECKED OFF ANY PROBLEMS ON THIS QUESTIONNAIRE, HOW DIFFICULT HAVE THESE PROBLEMS MADE IT FOR YOU TO DO YOUR WORK, TAKE CARE OF THINGS AT HOME, OR GET ALONG WITH OTHER PEOPLE: SOMEWHAT DIFFICULT

## 2023-08-23 ASSESSMENT — PATIENT HEALTH QUESTIONNAIRE - PHQ9
SUM OF ALL RESPONSES TO PHQ QUESTIONS 1-9: 3
SUM OF ALL RESPONSES TO PHQ QUESTIONS 1-9: 3
10. IF YOU CHECKED OFF ANY PROBLEMS, HOW DIFFICULT HAVE THESE PROBLEMS MADE IT FOR YOU TO DO YOUR WORK, TAKE CARE OF THINGS AT HOME, OR GET ALONG WITH OTHER PEOPLE: SOMEWHAT DIFFICULT

## 2023-08-23 NOTE — PROGRESS NOTES
Assessment & Plan     Type 2 diabetes mellitus with hyperosmolarity without coma, without long-term current use of insulin (H)  Weight is up and sugar is up.  We will start Jardiance.  Wants to avoid Metformin. Diarrhea was an issue.  On chemo so any added GI issues are a concern.    - empagliflozin (JARDIANCE) 25 MG TABS tablet; Take 1 tablet (25 mg) by mouth daily    Multiple myeloma not having achieved remission (H)  Had chemo yesterday M spike is up a little bit.  At 0.8 and was 0.4.  flushed today.  Feeling tired typical for a day after chemo.     Mixed hyperlipidemia  Bump up and see what her LDL is in 3 months. We will add this in   - atorvastatin (LIPITOR) 20 MG tablet; Take 1 tablet (20 mg) by mouth daily    Essential hypertension with goal blood pressure less than 140/90  Near goal.  Lower number a little high.  Continue same.     Review of external notes as documented elsewhere in note  Ordering of each unique test  Prescription drug management  15 minutes spent by me on the date of the encounter doing chart review, history and exam, documentation and further activities per the note       See Patient Instructions    No follow-ups on file.    VENKATESH Trevino  [facility] - [place] is a 72 year old, presenting for the following health issues:  Follow Up (Diabetes)      HPI     Diabetes Follow-up    How often are you checking your blood sugar? Not at all  What concerns do you have today about your diabetes? Other: weight gain   Do you have any of these symptoms? (Select all that apply)  Weight gain  Have you had a diabetic eye exam in the last 12 months? Yes- Date of last eye exam: 07/2023,  Location: [facility] [place]            Hyperlipidemia Follow-Up    Are you regularly taking any medication or supplement to lower your cholesterol?   Yes- cozaar  Are you having muscle aches or other side effects that you think could be caused by your cholesterol lowering  medication?  No    Hypertension Follow-up    Do you check your blood pressure regularly outside of the clinic? No   Are you following a low salt diet? Yes  Are your blood pressures ever more than 140 on the top number (systolic) OR more   than 90 on the bottom number (diastolic), for example 140/90? No    BP Readings from Last 2 Encounters:   08/23/23 122/88   08/22/23 119/68     Hemoglobin A1C (%)   Date Value   07/11/2023 6.4 (H)   11/16/2022 6.0 (H)   04/27/2021 6.4 (H)   01/26/2021 6.3 (H)     LDL Cholesterol Calculated (mg/dL)   Date Value   07/11/2023 133 (H)   05/18/2022 105 (H)   04/27/2021 137 (H)   02/14/2020 125 (H)           Answers submitted by the patient for this visit:  Patient Health Questionnaire (Submitted on 8/23/2023)  If you checked off any problems, how difficult have these problems made it for you to do your work, take care of things at home, or get along with other people?: Somewhat difficult  PHQ9 TOTAL SCORE: 3  MINAL-7 (Submitted on 8/23/2023)  MINAL 7 TOTAL SCORE: 1      Review of Systems   Constitutional, HEENT, cardiovascular, pulmonary, gi and gu systems are negative, except as otherwise noted.      Objective    /88 (BP Location: Left arm, Patient Position: Sitting, Cuff Size: Adult Regular)   Pulse 65   Temp 96.8  F (36  C) (Tympanic)   Resp 16   Wt 78.1 kg (172 lb 3.2 oz)   SpO2 97%   BMI 31.41 kg/m    Body mass index is 31.41 kg/m .  Physical Exam   GENERAL: healthy, alert and no distress  EYES: Eyes grossly normal to inspection, PERRL and conjunctivae and sclerae normal  HENT: ear canals and TM's normal, nose and mouth without ulcers or lesions  NECK: no adenopathy, no asymmetry, masses, or scars and thyroid normal to palpation  RESP: lungs clear to auscultation - no rales, rhonchi or wheezes  BREAST: normal without masses, tenderness or nipple discharge and no palpable axillary masses or adenopathy  CV: regular rate and rhythm, normal S1 S2, no S3 or S4, no murmur, click  or rub, no peripheral edema and peripheral pulses strong  ABDOMEN: soft, nontender, no hepatosplenomegaly, no masses and bowel sounds normal  MS: no gross musculoskeletal defects noted, no edema  SKIN: no suspicious lesions or rashes  NEURO: Normal strength and tone, mentation intact and speech normal  PSYCH: mentation appears normal, affect normal/bright  LYMPH: no cervical, supraclavicular, axillary, or inguinal adenopathy    Infusion Therapy Visit on 08/22/2023   Component Date Value Ref Range Status    WBC Count 08/22/2023 5.7  4.0 - 11.0 10e3/uL Final    RBC Count 08/22/2023 4.54  3.80 - 5.20 10e6/uL Final    Hemoglobin 08/22/2023 13.9  11.7 - 15.7 g/dL Final    Hematocrit 08/22/2023 41.0  35.0 - 47.0 % Final    MCV 08/22/2023 90  78 - 100 fL Final    MCH 08/22/2023 30.6  26.5 - 33.0 pg Final    MCHC 08/22/2023 33.9  31.5 - 36.5 g/dL Final    RDW 08/22/2023 13.7  10.0 - 15.0 % Final    Platelet Count 08/22/2023 224  150 - 450 10e3/uL Final    % Neutrophils 08/22/2023 43  % Final    % Lymphocytes 08/22/2023 39  % Final    % Monocytes 08/22/2023 11  % Final    % Eosinophils 08/22/2023 5  % Final    % Basophils 08/22/2023 1  % Final    % Immature Granulocytes 08/22/2023 1  % Final    NRBCs per 100 WBC 08/22/2023 0  <1 /100 Final    Absolute Neutrophils 08/22/2023 2.5  1.6 - 8.3 10e3/uL Final    Absolute Lymphocytes 08/22/2023 2.2  0.8 - 5.3 10e3/uL Final    Absolute Monocytes 08/22/2023 0.6  0.0 - 1.3 10e3/uL Final    Absolute Eosinophils 08/22/2023 0.3  0.0 - 0.7 10e3/uL Final    Absolute Basophils 08/22/2023 0.0  0.0 - 0.2 10e3/uL Final    Absolute Immature Granulocytes 08/22/2023 0.0  <=0.4 10e3/uL Final    Absolute NRBCs 08/22/2023 0.0  10e3/uL Final    ABO/RH(D) 08/22/2023 O POS   Final    Antibody Screen 08/22/2023 Negative  Negative Final    SPECIMEN EXPIRATION DATE 08/22/2023 [id number]   Final

## 2023-08-23 NOTE — PROGRESS NOTES
[facility] Chemotherapy Consult    The inpatient pharmacist has been consulted to review the patient's chart for  the following: any significant drug interactions between home medications, new take home medications, pre-medications, PRN medications, chemotherapy agents, and chemotherapy regimen.     Current Outpatient Medications   Medication Sig    acyclovir (ZOVIRAX) 400 MG tablet Take 1 tablet (400 mg) by mouth 2 times daily Start 1 week prior to daratumumab and continue for 3 months after treatment is complete.    aspirin (ASA) 81 MG chewable tablet Take 81 mg by mouth daily    atorvastatin (LIPITOR) 20 MG tablet Take 1 tablet (20 mg) by mouth daily    diphenhydrAMINE (BENADRYL) 25 MG capsule Take 25 mg by mouth daily    empagliflozin (JARDIANCE) 25 MG TABS tablet Take 1 tablet (25 mg) by mouth daily    fluticasone (FLONASE) 50 MCG/ACT nasal spray SPRAY 2 SPRAYS INTO BOTH NOSTRILS DAILY    L-Lysine HCl 500 MG CAPS Take by mouth daily    losartan (COZAAR) 50 MG tablet Take 1 tablet (50 mg) by mouth daily    sertraline (ZOLOFT) 50 MG tablet Take 0.5 tablets (25 mg) by mouth daily     No current facility-administered medications for this visit.     Take home medications:       Pre-Treats:       PRN medications:       Chemotherapy agents:       Cancer Being Treated: Multiple myeloma    Difference in Regimen Ordered vs. Standard Regimen: None       The following interactions were found and will require patient education:     Drug-Drug interactions:   Concurrent use of sertraline (Zoloft) and famotidine (Pepcid) may result in increased risk of QT-interval prolongation. Last QTc interval was 486 on 6/11/19.   Concurrent use of aspirin and dexamethasone may result in an increased risk of GI ulceration and bleeding.  Concurrent use of aspirin and sertraline (Zoloft) may result in increased risk of bleeding.    Drug-Food interactions:   Concurrent use of atorvastatin (Lipitor) and  grapefruit juice may result in increased atorvastatin (Lipitor) exposure and an increased risk of myopathy and rhabdomyolysis.     Drug-Ethanol interactions:   Acetaminophen: may result in an increased risk of hepatotoxicity.   Aspirin: may result in increased risk of GI bleeding.  Diphenhydramine (Benadryl) and lorazepam (Ativan): may result in increased sedation.   Sertraline (Zoloft): may result in an increased risk of impairment of mental and motor skills.     Drug-Tobacco interactions: Patient reports no longer smoker.    If there are any additional questions or concerns, please contact the inpatient pharmacy ([phone]) for further review.     Nakita Hernandez  August 23, 2023

## 2023-08-29 ENCOUNTER — INFUSION THERAPY VISIT (OUTPATIENT)
Dept: INFUSION THERAPY | Facility: OTHER | Age: 73
End: 2023-08-29
Attending: NURSE PRACTITIONER
Payer: MEDICARE

## 2023-08-29 VITALS
OXYGEN SATURATION: 98 % | SYSTOLIC BLOOD PRESSURE: 112 MMHG | WEIGHT: 166.89 LBS | HEART RATE: 89 BPM | TEMPERATURE: 98 F | BODY MASS INDEX: 30.44 KG/M2 | DIASTOLIC BLOOD PRESSURE: 82 MMHG | RESPIRATION RATE: 16 BRPM

## 2023-08-29 DIAGNOSIS — C90.00 MULTIPLE MYELOMA NOT HAVING ACHIEVED REMISSION (H): Primary | ICD-10-CM

## 2023-08-29 LAB
BASOPHILS # BLD AUTO: 0 10E3/UL (ref 0–0.2)
BASOPHILS NFR BLD AUTO: 0 %
EOSINOPHIL # BLD AUTO: 0.3 10E3/UL (ref 0–0.7)
EOSINOPHIL NFR BLD AUTO: 6 %
ERYTHROCYTE [DISTWIDTH] IN BLOOD BY AUTOMATED COUNT: 13.8 % (ref 10–15)
HCT VFR BLD AUTO: 41.7 % (ref 35–47)
HGB BLD-MCNC: 14.2 G/DL (ref 11.7–15.7)
IMM GRANULOCYTES # BLD: 0 10E3/UL
IMM GRANULOCYTES NFR BLD: 1 %
LYMPHOCYTES # BLD AUTO: 1.8 10E3/UL (ref 0.8–5.3)
LYMPHOCYTES NFR BLD AUTO: 33 %
MCH RBC QN AUTO: 30.2 PG (ref 26.5–33)
MCHC RBC AUTO-ENTMCNC: 34.1 G/DL (ref 31.5–36.5)
MCV RBC AUTO: 89 FL (ref 78–100)
MONOCYTES # BLD AUTO: 0.6 10E3/UL (ref 0–1.3)
MONOCYTES NFR BLD AUTO: 12 %
NEUTROPHILS # BLD AUTO: 2.7 10E3/UL (ref 1.6–8.3)
NEUTROPHILS NFR BLD AUTO: 48 %
NRBC # BLD AUTO: 0 10E3/UL
NRBC BLD AUTO-RTO: 0 /100
PLATELET # BLD AUTO: 194 10E3/UL (ref 150–450)
RBC # BLD AUTO: 4.7 10E6/UL (ref 3.8–5.2)
WBC # BLD AUTO: 5.4 10E3/UL (ref 4–11)

## 2023-08-29 PROCEDURE — 96401 CHEMO ANTI-NEOPL SQ/IM: CPT

## 2023-08-29 PROCEDURE — 36415 COLL VENOUS BLD VENIPUNCTURE: CPT | Mod: ZL | Performed by: INTERNAL MEDICINE

## 2023-08-29 PROCEDURE — 85025 COMPLETE CBC W/AUTO DIFF WBC: CPT | Mod: ZL | Performed by: INTERNAL MEDICINE

## 2023-08-29 PROCEDURE — 250N000011 HC RX IP 250 OP 636: Mod: JZ | Performed by: INTERNAL MEDICINE

## 2023-08-29 RX ORDER — ACETAMINOPHEN 325 MG/1
650 TABLET ORAL ONCE
Status: COMPLETED | OUTPATIENT
Start: 2023-08-29 | End: 2023-08-29

## 2023-08-29 RX ORDER — MONTELUKAST SODIUM 10 MG/1
10 TABLET ORAL ONCE
Status: COMPLETED | OUTPATIENT
Start: 2023-08-29 | End: 2023-08-29

## 2023-08-29 RX ORDER — DEXAMETHASONE 4 MG/1
20 TABLET ORAL ONCE
Status: COMPLETED | OUTPATIENT
Start: 2023-08-29 | End: 2023-08-29

## 2023-08-29 RX ORDER — DIPHENHYDRAMINE HCL 50 MG
50 CAPSULE ORAL ONCE
Status: COMPLETED | OUTPATIENT
Start: 2023-08-29 | End: 2023-08-29

## 2023-08-29 RX ADMIN — DEXAMETHASONE 20 MG: 4 TABLET ORAL at 09:59

## 2023-08-29 RX ADMIN — MONTELUKAST SODIUM 10 MG: 10 TABLET ORAL at 09:58

## 2023-08-29 RX ADMIN — DARATUMUMAB AND HYALURONIDASE-FIHJ (HUMAN RECOMBINANT) 1800 MG: 1800; 30000 INJECTION SUBCUTANEOUS at 10:36

## 2023-08-29 RX ADMIN — ACETAMINOPHEN 650 MG: 325 TABLET ORAL at 09:58

## 2023-08-29 RX ADMIN — Medication 50 MG: at 09:58

## 2023-08-29 NOTE — PROGRESS NOTES
Peripheral labs ordered. Butterfly needle inserted into right ac.  Immediate blood return noted. Ordered labs drawn. Needle removed, covered with sterile guaze and coban. Patient tolerated well, denies pain or discomfort at this time.     Infusion Nursing Note:  Carmelita Watts presents today for faspro.    Patient seen by provider today: No   present during visit today: Not Applicable.    Note: N/A.      Intravenous Access:  Labs drawn without difficulty.    Treatment Conditions:  Lab Results   Component Value Date    HGB 14.2 08/29/2023    WBC 5.4 08/29/2023    ANEU 1.0 (L) 01/03/2023    ANEUTAUTO 2.7 08/29/2023     08/29/2023        Lab Results   Component Value Date     08/14/2023    POTASSIUM 3.7 08/14/2023    CR 0.81 08/14/2023    PAYAL 9.8 08/14/2023    BILITOTAL 0.3 08/14/2023    ALBUMIN 4.1 08/14/2023    ALT 15 08/14/2023    AST 21 08/14/2023       Results reviewed, labs MET treatment parameters, ok to proceed with treatment.      Post Infusion Assessment:  Patient tolerated injection without incident left lower abdomen       Discharge Plan:   Discharge instructions reviewed with: Patient.      TERRENCE RITHCIE RN

## 2023-09-05 ENCOUNTER — INFUSION THERAPY VISIT (OUTPATIENT)
Dept: INFUSION THERAPY | Facility: OTHER | Age: 73
End: 2023-09-05
Attending: PHYSICIAN ASSISTANT
Payer: MEDICARE

## 2023-09-05 VITALS
TEMPERATURE: 98.9 F | RESPIRATION RATE: 16 BRPM | DIASTOLIC BLOOD PRESSURE: 64 MMHG | BODY MASS INDEX: 30.52 KG/M2 | WEIGHT: 167.33 LBS | SYSTOLIC BLOOD PRESSURE: 105 MMHG | OXYGEN SATURATION: 95 % | HEART RATE: 79 BPM

## 2023-09-05 DIAGNOSIS — C90.00 MULTIPLE MYELOMA NOT HAVING ACHIEVED REMISSION (H): Primary | ICD-10-CM

## 2023-09-05 LAB
BASOPHILS # BLD AUTO: 0 10E3/UL (ref 0–0.2)
BASOPHILS NFR BLD AUTO: 0 %
EOSINOPHIL # BLD AUTO: 0.3 10E3/UL (ref 0–0.7)
EOSINOPHIL NFR BLD AUTO: 6 %
ERYTHROCYTE [DISTWIDTH] IN BLOOD BY AUTOMATED COUNT: 13.7 % (ref 10–15)
HCT VFR BLD AUTO: 38.9 % (ref 35–47)
HGB BLD-MCNC: 13.3 G/DL (ref 11.7–15.7)
IMM GRANULOCYTES # BLD: 0 10E3/UL
IMM GRANULOCYTES NFR BLD: 1 %
LYMPHOCYTES # BLD AUTO: 1.9 10E3/UL (ref 0.8–5.3)
LYMPHOCYTES NFR BLD AUTO: 35 %
MCH RBC QN AUTO: 30.9 PG (ref 26.5–33)
MCHC RBC AUTO-ENTMCNC: 34.2 G/DL (ref 31.5–36.5)
MCV RBC AUTO: 91 FL (ref 78–100)
MONOCYTES # BLD AUTO: 0.7 10E3/UL (ref 0–1.3)
MONOCYTES NFR BLD AUTO: 12 %
NEUTROPHILS # BLD AUTO: 2.5 10E3/UL (ref 1.6–8.3)
NEUTROPHILS NFR BLD AUTO: 46 %
NRBC # BLD AUTO: 0 10E3/UL
NRBC BLD AUTO-RTO: 0 /100
PLATELET # BLD AUTO: 200 10E3/UL (ref 150–450)
RBC # BLD AUTO: 4.3 10E6/UL (ref 3.8–5.2)
WBC # BLD AUTO: 5.4 10E3/UL (ref 4–11)

## 2023-09-05 PROCEDURE — 36591 DRAW BLOOD OFF VENOUS DEVICE: CPT | Mod: ZL | Performed by: INTERNAL MEDICINE

## 2023-09-05 PROCEDURE — 250N000011 HC RX IP 250 OP 636: Mod: JZ | Performed by: INTERNAL MEDICINE

## 2023-09-05 PROCEDURE — 96401 CHEMO ANTI-NEOPL SQ/IM: CPT

## 2023-09-05 PROCEDURE — 36592 COLLECT BLOOD FROM PICC: CPT | Mod: ZL | Performed by: INTERNAL MEDICINE

## 2023-09-05 PROCEDURE — 36415 COLL VENOUS BLD VENIPUNCTURE: CPT | Mod: ZL | Performed by: INTERNAL MEDICINE

## 2023-09-05 PROCEDURE — 85025 COMPLETE CBC W/AUTO DIFF WBC: CPT | Mod: ZL | Performed by: INTERNAL MEDICINE

## 2023-09-05 RX ORDER — MONTELUKAST SODIUM 10 MG/1
10 TABLET ORAL ONCE
Status: COMPLETED | OUTPATIENT
Start: 2023-09-05 | End: 2023-09-05

## 2023-09-05 RX ORDER — DEXAMETHASONE 4 MG/1
12 TABLET ORAL ONCE
Status: COMPLETED | OUTPATIENT
Start: 2023-09-05 | End: 2023-09-05

## 2023-09-05 RX ORDER — DIPHENHYDRAMINE HCL 50 MG
50 CAPSULE ORAL ONCE
Status: COMPLETED | OUTPATIENT
Start: 2023-09-05 | End: 2023-09-05

## 2023-09-05 RX ORDER — ACETAMINOPHEN 325 MG/1
650 TABLET ORAL ONCE
Status: COMPLETED | OUTPATIENT
Start: 2023-09-05 | End: 2023-09-05

## 2023-09-05 RX ADMIN — MONTELUKAST SODIUM 10 MG: 10 TABLET ORAL at 14:53

## 2023-09-05 RX ADMIN — DEXAMETHASONE 12 MG: 4 TABLET ORAL at 14:52

## 2023-09-05 RX ADMIN — DARATUMUMAB AND HYALURONIDASE-FIHJ (HUMAN RECOMBINANT) 1800 MG: 1800; 30000 INJECTION SUBCUTANEOUS at 15:18

## 2023-09-05 RX ADMIN — Medication 50 MG: at 14:52

## 2023-09-05 RX ADMIN — ACETAMINOPHEN 650 MG: 325 TABLET ORAL at 14:53

## 2023-09-05 NOTE — PROGRESS NOTES
Infusion Nursing Note:  Carmelita Watts presents today for injection of Faspro.    Patient seen by provider today: No   present during visit today: Not Applicable.      Treatment Conditions:  Results reviewed, labs MET treatment parameters, ok to proceed with treatment.  Component      Latest Ref Rng 9/5/2023  2:43 PM   WBC      4.0 - 11.0 10e3/uL 5.4    RBC Count      3.80 - 5.20 10e6/uL 4.30    Hemoglobin      11.7 - 15.7 g/dL 13.3    Hematocrit      35.0 - 47.0 % 38.9    MCV      78 - 100 fL 91    MCH      26.5 - 33.0 pg 30.9    MCHC      31.5 - 36.5 g/dL 34.2    RDW      10.0 - 15.0 % 13.7    Platelet Count      150 - 450 10e3/uL 200    % Neutrophils      % 46    % Lymphocytes      % 35    % Monocytes      % 12    % Eosinophils      % 6    % Basophils      % 0    % Immature Granulocytes      % 1    NRBCs per 100 WBC      <1 /100 0    Absolute Neutrophils      1.6 - 8.3 10e3/uL 2.5    Absolute Lymphocytes      0.8 - 5.3 10e3/uL 1.9    Absolute Monocytes      0.0 - 1.3 10e3/uL 0.7    Absolute Eosinophils      0.0 - 0.7 10e3/uL 0.3    Absolute Basophils      0.0 - 0.2 10e3/uL 0.0    Absolute Immature Granulocytes      <=0.4 10e3/uL 0.0    Absolute NRBCs      10e3/uL 0.0        The following medication was given:     MEDICATION: Faspro  ROUTE: SQ  SITE: RUQ - Abd.    Post Injection Assessment:  Patient tolerated injection without incident.       Discharge Plan:   Patient discharged in stable condition accompanied by: self.  Departure Mode: Ambulatory.      UZMA Mart

## 2023-09-05 NOTE — PROGRESS NOTES
Infusion Nursing Note:          Intravenous Access:  Lab draw site AC, Needle type Butterfly, Gauge 21.  Labs drawn without difficulty.      KAYLEE Mcclain

## 2023-09-11 DIAGNOSIS — C90.00 MULTIPLE MYELOMA NOT HAVING ACHIEVED REMISSION (H): Primary | ICD-10-CM

## 2023-09-12 ENCOUNTER — INFUSION THERAPY VISIT (OUTPATIENT)
Dept: INFUSION THERAPY | Facility: OTHER | Age: 73
End: 2023-09-12
Attending: PHYSICIAN ASSISTANT
Payer: MEDICARE

## 2023-09-12 VITALS
DIASTOLIC BLOOD PRESSURE: 87 MMHG | BODY MASS INDEX: 30.92 KG/M2 | RESPIRATION RATE: 16 BRPM | WEIGHT: 169.53 LBS | TEMPERATURE: 97.5 F | SYSTOLIC BLOOD PRESSURE: 134 MMHG | HEART RATE: 72 BPM | OXYGEN SATURATION: 99 %

## 2023-09-12 DIAGNOSIS — C90.00 MULTIPLE MYELOMA NOT HAVING ACHIEVED REMISSION (H): Primary | ICD-10-CM

## 2023-09-12 LAB
BASOPHILS # BLD AUTO: 0 10E3/UL (ref 0–0.2)
BASOPHILS NFR BLD AUTO: 0 %
EOSINOPHIL # BLD AUTO: 0.3 10E3/UL (ref 0–0.7)
EOSINOPHIL NFR BLD AUTO: 5 %
ERYTHROCYTE [DISTWIDTH] IN BLOOD BY AUTOMATED COUNT: 13.7 % (ref 10–15)
HCT VFR BLD AUTO: 39.4 % (ref 35–47)
HGB BLD-MCNC: 13.5 G/DL (ref 11.7–15.7)
IMM GRANULOCYTES # BLD: 0 10E3/UL
IMM GRANULOCYTES NFR BLD: 1 %
LDH SERPL L TO P-CCNC: 134 U/L (ref 0–250)
LYMPHOCYTES # BLD AUTO: 1.9 10E3/UL (ref 0.8–5.3)
LYMPHOCYTES NFR BLD AUTO: 33 %
MCH RBC QN AUTO: 30.5 PG (ref 26.5–33)
MCHC RBC AUTO-ENTMCNC: 34.3 G/DL (ref 31.5–36.5)
MCV RBC AUTO: 89 FL (ref 78–100)
MONOCYTES # BLD AUTO: 0.6 10E3/UL (ref 0–1.3)
MONOCYTES NFR BLD AUTO: 11 %
NEUTROPHILS # BLD AUTO: 2.8 10E3/UL (ref 1.6–8.3)
NEUTROPHILS NFR BLD AUTO: 50 %
NRBC # BLD AUTO: 0 10E3/UL
NRBC BLD AUTO-RTO: 0 /100
PLATELET # BLD AUTO: 204 10E3/UL (ref 150–450)
RBC # BLD AUTO: 4.42 10E6/UL (ref 3.8–5.2)
WBC # BLD AUTO: 5.6 10E3/UL (ref 4–11)

## 2023-09-12 PROCEDURE — 84155 ASSAY OF PROTEIN SERUM: CPT | Mod: ZL

## 2023-09-12 PROCEDURE — 85810 BLOOD VISCOSITY EXAMINATION: CPT | Mod: ZL

## 2023-09-12 PROCEDURE — 82232 ASSAY OF BETA-2 PROTEIN: CPT | Mod: ZL

## 2023-09-12 PROCEDURE — 36415 COLL VENOUS BLD VENIPUNCTURE: CPT | Mod: ZL

## 2023-09-12 PROCEDURE — 84165 PROTEIN E-PHORESIS SERUM: CPT | Mod: ZL | Performed by: PATHOLOGY

## 2023-09-12 PROCEDURE — 85014 HEMATOCRIT: CPT | Mod: ZL | Performed by: INTERNAL MEDICINE

## 2023-09-12 PROCEDURE — 83615 LACTATE (LD) (LDH) ENZYME: CPT | Mod: ZL

## 2023-09-12 PROCEDURE — 250N000011 HC RX IP 250 OP 636: Mod: JZ | Performed by: INTERNAL MEDICINE

## 2023-09-12 PROCEDURE — 86334 IMMUNOFIX E-PHORESIS SERUM: CPT | Mod: ZL | Performed by: PATHOLOGY

## 2023-09-12 PROCEDURE — 82784 ASSAY IGA/IGD/IGG/IGM EACH: CPT | Mod: ZL

## 2023-09-12 PROCEDURE — 83521 IG LIGHT CHAINS FREE EACH: CPT | Mod: ZL

## 2023-09-12 PROCEDURE — 96401 CHEMO ANTI-NEOPL SQ/IM: CPT

## 2023-09-12 RX ADMIN — DARATUMUMAB AND HYALURONIDASE-FIHJ (HUMAN RECOMBINANT) 1800 MG: 1800; 30000 INJECTION SUBCUTANEOUS at 10:06

## 2023-09-12 NOTE — PROGRESS NOTES
Infusion Nursing Note:  Carmelita Watts presents today for Faspro.    Patient seen by provider today: No   present during visit today: Not Applicable.    Note:   Component      Latest Ref Rng 9/12/2023  9:10 AM   WBC      4.0 - 11.0 10e3/uL 5.6    RBC Count      3.80 - 5.20 10e6/uL 4.42    Hemoglobin      11.7 - 15.7 g/dL 13.5    Hematocrit      35.0 - 47.0 % 39.4    MCV      78 - 100 fL 89    MCH      26.5 - 33.0 pg 30.5    MCHC      31.5 - 36.5 g/dL 34.3    RDW      10.0 - 15.0 % 13.7    Platelet Count      150 - 450 10e3/uL 204    % Neutrophils      % 50    % Lymphocytes      % 33    % Monocytes      % 11    % Eosinophils      % 5    % Basophils      % 0    % Immature Granulocytes      % 1    NRBCs per 100 WBC      <1 /100 0    Absolute Neutrophils      1.6 - 8.3 10e3/uL 2.8    Absolute Lymphocytes      0.8 - 5.3 10e3/uL 1.9    Absolute Monocytes      0.0 - 1.3 10e3/uL 0.6    Absolute Eosinophils      0.0 - 0.7 10e3/uL 0.3    Absolute Basophils      0.0 - 0.2 10e3/uL 0.0    Absolute Immature Granulocytes      <=0.4 10e3/uL 0.0    Absolute NRBCs      10e3/uL 0.0        Intravenous Access:  Lab draw site RFA, Needle type butterfly, Gauge 23.    Treatment Conditions:  Results reviewed, labs MET treatment parameters, ok to proceed with treatment.      Post Infusion Assessment:  Patient tolerated injection without incident.       Discharge Plan:   Discharge instructions reviewed with: Patient.      Emily Canales RN

## 2023-09-12 NOTE — PROGRESS NOTES
Oncology Follow-up Visit    Reason for Visit:  Carmelita is a 72 year old woman with a diagnosis of multiple myeloma, who I am visiting with today for routine follow-up and consideration of ongoing therapy.     Nursing Note and documentation reviewed: Yes    Interval History:   Carlene notes that she has been doing better. She notes that for the last week or so, she has developed a upper respiratory illness. Started with a sore throat the day of treatment last week which progressed into a runny nose and then coughing. She denies having had any fevers although did feel warm one night. She denies sinus pressure or headaches. Interestingly, she notes that she wasn't given pre-medications prior to her C1D22 Darzalex and wonders if this could have contributed.     Otherwise, she denies signs of bleeding. No nausea or vomiting. Appetite so-so. Bowels regular. No skin concerns. Energy just okay. Tries to stay active and busy but realizes she can't do all that she once was able to do.      Oncologic History  12/31/2018 Presented to the emergency room with vertigo and fatigue.  CT scan of the head was negative and subsequent stress test was negative.  05/03/2019 PCP ordered lab work and noted a total protein of 12.9.  SPEP at that time showed an M spike of 6.2 with a large monoclonal protein seen in the gamma fraction. Urine immunofixation showed a possible small protein band in the gamma fraction  05/31/2019 Evaluated by Dr. Walker with Medical Oncology with plan to rule out myeloma and obtain bone marrow aspiration biopsy as well as a metastatic bone survey along with additional labwork  06/18/2019 Underwent bone marrow aspiration and biopsy  06/24/2019 Seen again by Dr. Walker and CBC showed a hemoglobin of 9.3, M spike 7.3 with monoclonal IgG immunoglobulin of kappa light chain type; serum viscosity was 2.9; quantitative immunoglobulins showed an IgG of 8160, beta-2 microglobulin was 5.8, BUN was 21 with creatinine is 0.8  and total protein was 13.7.  Quantitative kappa/lambda free light chains showed an elevated ratio of 17.0 bone marrow aspiration biopsy showed plasma cell myeloma with approximately 80% plasma cells.  Immunofixation showed IgG kappa and flow cytometry revealed kappa monotypic plasma cells consistent with clonal plasma cell neoplasm and FISH panel was pending at that time. It was felt she had at least stage II disease based on her beta-2 microglobulin and anemia. Plan was to treat with Velcade 1.3 mg per/m2 days 1, 4, 8 and 11/Decadron 40 mg on days 1, 8 and 15. Initiation of Revlimid with C2 at 25 mg daily days 1 through 14.  Plan was to also obtain an MRI of the lumbar spine to rule out lytic lesion at L3.  She was initiated on Zovirax 400 mg p.o. twice daily.  06/25/2019 C1 of chemotherapy initiated  07/01/2019 Note in chart regarding patient's large co-pay for the Revlimid and no plan at this point to initiate Revlimid and treat only with Velcade and Decadron per Dr. Walker  07/11/2019 MRI lumbar spine shows a pathologic superior endplate compression fracture at L3 without evidence of retropulsed fragment and innumerable enhancing lesions throughout the lumbar spine consistent with history of multiple myeloma.  09/10/2019 Increasing M-spike and kappa lambda ratio  10/01/2019 Initiation of CyBorD  11/17/2020 Plateau of M-spike at 1.2 after 36 cycles of CyBorD  12/01/2020 Initiation of Darzalex Faspro injection  03/23/2021 M-spike increased form 1.0 to 1.2 and velcade and dexamethasone added to plan  04/12/2022 Treatment HOLIDAY commenced with Mspike 0.3 after 22 cycles of treatent  05/27/2022 Received Evusheld while on treatment holiday.   07/18/2023 Mspike 0.8. Other labs stable. PET scan ordered.   08/02/2023 PET completed showing negative study. No evidence of mass or lymphadenopathy. No concerning hypermetabolism is present.  08/14/2023 Met with Dr. Walker. Given biochemical progression, he did feel  "appropriate to resume therapy again with Darzalex faspro      Current Chemo Regime/TX: Darzalex faspro injection 1800mg subcutaneous per protocol  Current Cycle: 2  Completed cycles: 1  **C1/C2 Days 1, 8, 15, 22     Previous treatment:   Velcade 1.3 mg/m2 days 1, 4, 8 and 11 with Decadron 40 mg days 1, 8 and 15 x 4 cycles;   Velcade 1.5mg.m2/cyclophosphamide 150mg every 7 days on days 1,8,15 and 22/decadron 40mg days 1,8,15,22 ; Darzalex faspro injection 1800mg subcutaneous per protocol    Past Medical History:   Diagnosis Date    Arthritis     Cyst of breast     Depressive disorder     Diabetes mellitus, type 2 (H) 1/18/2021    Essential hypertension 10/1/2015    Major depressive disorder, recurrent episode, mild (H) 10/1/2015    Mixed hyperlipidemia 10/1/2015    Multiple myeloma not having achieved remission (H) 6/24/2019    Other specified disorders of bladder 07/09/2012    Irritable Bladder    Seasonal allergies 10/1/2015    Unspecified essential hypertension 03/19/2007    Unspecified sinusitis (chronic) 09/05/2007       Past Surgical History:   Procedure Laterality Date    APPENDECTOMY      BONE MARROW BIOPSY, BONE SPECIMEN, NEEDLE/TROCAR N/A 6/18/2019    Procedure: BONE MARROW BIOPSY;  Surgeon: Maciej Sanz MD;  Location: [facility] OR    CHOLECYSTECTOMY      COLONOSCOPY  07-    repeat 10 years    COLONOSCOPY N/A 12/30/2016    Procedure: COLONOSCOPY;  Surgeon: Bhaskar Franklin DO;  Location: [facility] OR    PUNC/ASPIR BREAST CYST Left 1995    benign    SINUS SURGERY      TUBAL STERILIZATION         Family History   Problem Relation Age of Onset    Breast Cancer Mother 60        Cause of Death    Parkinsonism Father         \"Possible\"    Coronary Artery Disease Father     Pacemaker Father     Thyroid Disease Daughter     Breast Cancer Maternal Aunt         over 50    Diabetes No family hx of     Hypertension No family hx of     Hyperlipidemia No family hx of     Cerebrovascular Disease No family hx of     " Colon Cancer No family hx of     Prostate Cancer No family hx of     Genetic Disorder No family hx of     Asthma No family hx of     Anesthesia Reaction No family hx of        Social History     Socioeconomic History    Marital status:      Spouse name: Not on file    Number of children: Not on file    Years of education: Not on file    Highest education level: Not on file   Occupational History    Occupation: Financial     Comment:  - ([employer])   Tobacco Use    Smoking status: Never    Smokeless tobacco: Never    Tobacco comments:     Tried to Quit (YES); QUIT in 1971; Passive Exposure (NO)   Vaping Use    Vaping Use: Never used   Substance and Sexual Activity    Alcohol use: Yes     Comment: RARELY    Drug use: No    Sexual activity: Yes     Partners: Male     Birth control/protection: None   Other Topics Concern     Service Not Asked    Blood Transfusions Not Asked    Caffeine Concern Yes     Comment: Coffee >6 cups daily    Occupational Exposure Not Asked    Hobby Hazards Not Asked    Sleep Concern Not Asked    Stress Concern Not Asked    Weight Concern Not Asked    Special Diet Not Asked    Back Care Not Asked    Exercise Not Asked    Bike Helmet Not Asked    Seat Belt Not Asked    Self-Exams Not Asked    Parent/sibling w/ CABG, MI or angioplasty before 65F 55M? No   Social History Narrative    Not on file     Social Determinants of Health     Financial Resource Strain: Not on file   Food Insecurity: Not on file   Transportation Needs: Not on file   Physical Activity: Not on file   Stress: Not on file   Social Connections: Not on file   Intimate Partner Violence: Not on file   Housing Stability: Not on file       Current Outpatient Medications   Medication    acyclovir (ZOVIRAX) 400 MG tablet    aspirin (ASA) 81 MG chewable tablet    atorvastatin (LIPITOR) 20 MG tablet    diphenhydrAMINE (BENADRYL) 25 MG capsule    fluticasone (FLONASE) 50 MCG/ACT nasal spray    L-Lysine HCl 500 MG CAPS  "   losartan (COZAAR) 50 MG tablet    sertraline (ZOLOFT) 50 MG tablet     No current facility-administered medications for this visit.        Allergies   Allergen Reactions    Lisinopril Cough    Phenylephrine Hcl Other (See Comments)     **Entex  HEADACHE (SEVERE)    Phenylpropanolamine Other (See Comments)     **Entex  HEADACHE (SEVERE)    Pseudoephedrine Tannate Other (See Comments)     **Entex  HEADACHE (SEVERE)    Levofloxacin Rash     **Levaquin    Moxifloxacin Hcl [Moxifloxacin Hcl In Nacl] Rash       Review Of Systems:  A complete review of systems is negative except for the above mentioned items in the interval history.     Physical Exam:  /62   Pulse 75   Temp 98  F (36.7  C) (Tympanic)   Ht 1.6 m (5' 3\")   Wt 76.2 kg (167 lb 15.9 oz)   SpO2 98%   BMI 29.76 kg/m    GENERAL APPEARANCE: Appears ill, fatigued but in no acute distress  HEENT: Eyes appear normal without scleral icterus. Extraocular movements intact.   NECK:   Supple with normal range of motion. No asymmetry or masses.  LYMPHATICS: No palpable cervical, supraclavicular nodes.  RESP: Lungs clear to auscultation bilaterally, respirations regular and easy.  CARDIOVASCULAR: Regular rate and rhythm. Normal S1, S2; no murmur, gallop, or rub.  ABDOMEN: Soft, non-tender, non-distended. Bowel sounds auscultated all 4 quadrants. No palpable organomegaly or masses.  MUSCULOSKELETAL: Extremities without gross deformities noted. No edema of bilateral lower extremities.  SKIN: No suspicious lesions or rashes.  NEURO: Alert and oriented x 3.  Gait steady.  PSYCHIATRIC: Mentation and affect appear normal.  Mood appropriate.    Laboratory:  Results for orders placed or performed in visit on 09/19/23   CBC with platelets and differential     Status: None   Result Value Ref Range    WBC Count 5.5 4.0 - 11.0 10e3/uL    RBC Count 4.23 3.80 - 5.20 10e6/uL    Hemoglobin 13.0 11.7 - 15.7 g/dL    Hematocrit 38.2 35.0 - 47.0 %    MCV 90 78 - 100 fL    MCH " 30.7 26.5 - 33.0 pg    MCHC 34.0 31.5 - 36.5 g/dL    RDW 13.9 10.0 - 15.0 %    Platelet Count 191 150 - 450 10e3/uL    % Neutrophils 53 %    % Lymphocytes 32 %    % Monocytes 9 %    % Eosinophils 5 %    % Basophils 0 %    % Immature Granulocytes 1 %    NRBCs per 100 WBC 0 <1 /100    Absolute Neutrophils 3.0 1.6 - 8.3 10e3/uL    Absolute Lymphocytes 1.8 0.8 - 5.3 10e3/uL    Absolute Monocytes 0.5 0.0 - 1.3 10e3/uL    Absolute Eosinophils 0.3 0.0 - 0.7 10e3/uL    Absolute Basophils 0.0 0.0 - 0.2 10e3/uL    Absolute Immature Granulocytes 0.0 <=0.4 10e3/uL    Absolute NRBCs 0.0 10e3/uL   CBC with platelets differential     Status: None    Narrative    The following orders were created for panel order CBC with platelets differential.  Procedure                               Abnormality         Status                     ---------                               -----------         ------                     CBC with platelets and d...[[id number]]                      Final result                 Please view results for these tests on the individual orders.     Component      Latest Ref Rng 9/12/2023  9:10 AM   WBC      4.0 - 11.0 10e3/uL 5.6    RBC Count      3.80 - 5.20 10e6/uL 4.42    Hemoglobin      11.7 - 15.7 g/dL 13.5    Hematocrit      35.0 - 47.0 % 39.4    MCV      78 - 100 fL 89    MCH      26.5 - 33.0 pg 30.5    MCHC      31.5 - 36.5 g/dL 34.3    RDW      10.0 - 15.0 % 13.7    Platelet Count      150 - 450 10e3/uL 204    % Neutrophils      % 50    % Lymphocytes      % 33    % Monocytes      % 11    % Eosinophils      % 5    % Basophils      % 0    % Immature Granulocytes      % 1    NRBCs per 100 WBC      <1 /100 0    Absolute Neutrophils      1.6 - 8.3 10e3/uL 2.8    Absolute Lymphocytes      0.8 - 5.3 10e3/uL 1.9    Absolute Monocytes      0.0 - 1.3 10e3/uL 0.6    Absolute Eosinophils      0.0 - 0.7 10e3/uL 0.3    Absolute Basophils      0.0 - 0.2 10e3/uL 0.0    Absolute Immature Granulocytes      <=0.4 10e3/uL  0.0    Absolute NRBCs      10e3/uL 0.0    Albumin Fraction      3.7 - 5.1 g/dL 3.9    Alpha 1 Fraction      0.2 - 0.4 g/dL 0.3    Alpha 2 Fraction      0.5 - 0.9 g/dL 0.8    Beta Fraction      0.6 - 1.0 g/dL 0.7    Gamma Fraction      0.7 - 1.6 g/dL 1.1    Monoclonal Peak      <=0.0 g/dL 0.7 (H)    ELP Interpretation: Monoclonal protein (0.7 g/dL) seen in the gamma fraction. See immunofixation report on same specimen. Pathologic significance requires clinical correlation. Basim Tomlinson M.D., Ph.D., Pathologist.    IGG      610 - 1,616 mg/dL 1,272    IGA      84 - 499 mg/dL 15 (L)    IGM      35 - 242 mg/dL 48    Kappa Free Lt Chain      0.33 - 1.94 mg/dL 0.45    Lambda Free Lt Chain      0.57 - 2.63 mg/dL 0.27 (L)    Kappa Lambda Ratio      0.26 - 1.65  1.67 (H)    Beta-2-Microglobulin      <2.3 mg/L 2.6 (H)    Viscosity Index      1.4 - 1.8  1.5    Immunofixation ELP Monoclonal IgG immunoglobulin of kappa light chain type. Monoclonal antibody therapeutics (e.g. Daratumumab) may appear as monoclonal proteins on serum electrophoresis and immunofixation. Results should be interpreted with caution. Pathologic significance requires clinical correlation. Basim Tomlinson M.D., Ph.D., Pathologist    Lactate Dehydrogenase      0 - 250 U/L 134    Total Protein Serum for ELP      6.4 - 8.3 g/dL 6.8       Legend:  (H) High  (L) Low    Imaging Studies:  None this visit.     ASSESSMENT/PLAN:  #1 Multiple myeloma IgG kappa multiple myeloma with 80% involvement of the bone marrow diagnosed June 2019 initially treated with Velcade and Decadron and received 4 cycles with increasing M-spike and kappa/lambda ratio.  Treatment changed to CyBorD on 10/1/2019.  M-spike plateau at 1.2 and treatment changed to monotherapy with Darzalex Faspro injection. M spike declined to 1.0 then increased again to 1.2 so Velcade and Dex added to her treatment plan with cycle 5 therapy. Following C22 (April 2022), she did initiate a treatment  holiday when her Mspike was 0.3. We had followed her counts for over a year. Unfortunately, most recently, she did see a rise in her mspike to 0.8. PET completed was negative. Dr. Walker felt this was a biochemical progression and after discussion with patient, elected to resume treatment with Darzalex faspro.     Today, she returns having completed C1 treatment. She tolerated reasonably well, however, since her D22 injection, she has developed a presumably viral URI. She has had symptoms for the last week which have continued, although she has made improvements. Labs today don't endorse infection but patient not feeling well. Her myeloma labs have shown a slight improvement in both her mspike and light chain ratio. We will defer treatment one week. I will see her next week with CBC prior for consideration of C2 treatment. She will continue with her prophylactic acyclovir.     #2 Lytic lesions She remains on calcium with vitamin D twice a day. Weight bearing activity. She is going to check to determine when her last dental exam was. We should get her restarted on Zometa now that we have resumed treatment.     #3 URI Seems to be viral in nature. Making improvements. No antibiotic at this time but if she worsens or develops fever, she will notify us and we can send in a prescription.       Patient in agreement with plan and verbalizes understanding. Agrees to call with any questions or concerns.    41 minutes spent in the patient's encounter today with time spent in review of patient's chart along with chart preparation and review of the treatment plan and signing of treatment plan.  Time was also spent with the patient in obtaining a review of systems and performing a physical exam along with detailed review of all test results. Time was also spent in discussing plan for future follow-up and relating instructions for follow-up and in placing future orders.    Nida Gonzalez, FLO, FNP-C

## 2023-09-13 LAB
ALBUMIN SERPL ELPH-MCNC: 3.9 G/DL (ref 3.7–5.1)
ALPHA1 GLOB SERPL ELPH-MCNC: 0.3 G/DL (ref 0.2–0.4)
ALPHA2 GLOB SERPL ELPH-MCNC: 0.8 G/DL (ref 0.5–0.9)
B-GLOBULIN SERPL ELPH-MCNC: 0.7 G/DL (ref 0.6–1)
B2 MICROGLOB TUMOR MARKER SER-MCNC: 2.6 MG/L
GAMMA GLOB SERPL ELPH-MCNC: 1.1 G/DL (ref 0.7–1.6)
IGA SERPL-MCNC: 15 MG/DL (ref 84–499)
IGG SERPL-MCNC: 1272 MG/DL (ref 610–1616)
IGM SERPL-MCNC: 48 MG/DL (ref 35–242)
KAPPA LC FREE SER-MCNC: 0.45 MG/DL (ref 0.33–1.94)
KAPPA LC FREE/LAMBDA FREE SER NEPH: 1.67 {RATIO} (ref 0.26–1.65)
LAMBDA LC FREE SERPL-MCNC: 0.27 MG/DL (ref 0.57–2.63)
M PROTEIN SERPL ELPH-MCNC: 0.7 G/DL
PROT PATTERN SERPL ELPH-IMP: ABNORMAL
PROT PATTERN SERPL IFE-IMP: NORMAL
TOTAL PROTEIN SERUM FOR ELP: 6.8 G/DL (ref 6.4–8.3)
VISC SER: 1.5 CP (ref 1.4–1.8)

## 2023-09-13 PROCEDURE — 86334 IMMUNOFIX E-PHORESIS SERUM: CPT

## 2023-09-13 PROCEDURE — 84165 PROTEIN E-PHORESIS SERUM: CPT | Mod: 26

## 2023-09-19 ENCOUNTER — APPOINTMENT (OUTPATIENT)
Dept: LAB | Facility: OTHER | Age: 73
End: 2023-09-19
Payer: MEDICARE

## 2023-09-19 ENCOUNTER — ONCOLOGY VISIT (OUTPATIENT)
Dept: ONCOLOGY | Facility: OTHER | Age: 73
End: 2023-09-19
Attending: NURSE PRACTITIONER
Payer: COMMERCIAL

## 2023-09-19 ENCOUNTER — TELEPHONE (OUTPATIENT)
Dept: INFUSION THERAPY | Facility: OTHER | Age: 73
End: 2023-09-19

## 2023-09-19 VITALS
TEMPERATURE: 98 F | HEIGHT: 63 IN | BODY MASS INDEX: 29.77 KG/M2 | WEIGHT: 167.99 LBS | SYSTOLIC BLOOD PRESSURE: 108 MMHG | HEART RATE: 75 BPM | OXYGEN SATURATION: 98 % | DIASTOLIC BLOOD PRESSURE: 62 MMHG

## 2023-09-19 DIAGNOSIS — J06.9 VIRAL URI WITH COUGH: ICD-10-CM

## 2023-09-19 DIAGNOSIS — C90.00 MULTIPLE MYELOMA NOT HAVING ACHIEVED REMISSION (H): Primary | ICD-10-CM

## 2023-09-19 DIAGNOSIS — M89.9 BONE LESION: ICD-10-CM

## 2023-09-19 LAB
BASOPHILS # BLD AUTO: 0 10E3/UL (ref 0–0.2)
BASOPHILS NFR BLD AUTO: 0 %
EOSINOPHIL # BLD AUTO: 0.3 10E3/UL (ref 0–0.7)
EOSINOPHIL NFR BLD AUTO: 5 %
ERYTHROCYTE [DISTWIDTH] IN BLOOD BY AUTOMATED COUNT: 13.9 % (ref 10–15)
HCT VFR BLD AUTO: 38.2 % (ref 35–47)
HGB BLD-MCNC: 13 G/DL (ref 11.7–15.7)
IMM GRANULOCYTES # BLD: 0 10E3/UL
IMM GRANULOCYTES NFR BLD: 1 %
LYMPHOCYTES # BLD AUTO: 1.8 10E3/UL (ref 0.8–5.3)
LYMPHOCYTES NFR BLD AUTO: 32 %
MCH RBC QN AUTO: 30.7 PG (ref 26.5–33)
MCHC RBC AUTO-ENTMCNC: 34 G/DL (ref 31.5–36.5)
MCV RBC AUTO: 90 FL (ref 78–100)
MONOCYTES # BLD AUTO: 0.5 10E3/UL (ref 0–1.3)
MONOCYTES NFR BLD AUTO: 9 %
NEUTROPHILS # BLD AUTO: 3 10E3/UL (ref 1.6–8.3)
NEUTROPHILS NFR BLD AUTO: 53 %
NRBC # BLD AUTO: 0 10E3/UL
NRBC BLD AUTO-RTO: 0 /100
PLATELET # BLD AUTO: 191 10E3/UL (ref 150–450)
RBC # BLD AUTO: 4.23 10E6/UL (ref 3.8–5.2)
WBC # BLD AUTO: 5.5 10E3/UL (ref 4–11)

## 2023-09-19 PROCEDURE — G0463 HOSPITAL OUTPT CLINIC VISIT: HCPCS

## 2023-09-19 PROCEDURE — 85025 COMPLETE CBC W/AUTO DIFF WBC: CPT | Mod: ZL | Performed by: INTERNAL MEDICINE

## 2023-09-19 PROCEDURE — 99215 OFFICE O/P EST HI 40 MIN: CPT | Performed by: NURSE PRACTITIONER

## 2023-09-19 PROCEDURE — 36415 COLL VENOUS BLD VENIPUNCTURE: CPT | Mod: ZL | Performed by: INTERNAL MEDICINE

## 2023-09-19 ASSESSMENT — PAIN SCALES - GENERAL: PAINLEVEL: NO PAIN (0)

## 2023-09-19 NOTE — NURSING NOTE
"Oncology Rooming Note    September 19, 2023 9:47 AM   Carmelita Watts is a 72 year old female who presents for:    Chief Complaint   Patient presents with    Oncology Clinic Visit     Follow up. Multiple myeloma not having achieved remission      Initial Vitals: /62   Pulse 75   Temp 98  F (36.7  C) (Tympanic)   Ht 1.6 m (5' 3\")   Wt 76.2 kg (167 lb 15.9 oz)   SpO2 98%   BMI 29.76 kg/m   Estimated body mass index is 29.76 kg/m  as calculated from the following:    Height as of this encounter: 1.6 m (5' 3\").    Weight as of this encounter: 76.2 kg (167 lb 15.9 oz). Body surface area is 1.84 meters squared.  No Pain (0) Comment: Data Unavailable   No LMP recorded. Patient is postmenopausal.  Allergies reviewed: Yes  Medications reviewed: Yes    Medications: Medication refills not needed today.  Pharmacy name entered into [facility]:    [id number] - [place], [place] - [address]  AT [facility] [address]  [facility] - [place], [place] - [address] [facility] - [place] - [address]  [facility]    Clinical concerns: none       Julia Andrade LPN              "

## 2023-09-19 NOTE — PROGRESS NOTES
Message from Em in ONC alerting that per Nida Gonzalez NP ONC, patient will not be treated today. ONC will adjust orders/plan accordingly.

## 2023-09-19 NOTE — PATIENT INSTRUCTIONS
Thank you for allowing me to be a part of your care today.    We are going to hold treatment today. I would to see you back in one week with labs prior and planned infusion afterwards. This will be the start of C2 (Day 1). You will also be due for injection Day 8, Day 15, and Day 22.     If you have any questions please call [phone]    Other instructions:      Find out when last or next dental visit was    Please call if symptoms worsen or fail to improve.

## 2023-09-20 NOTE — PROGRESS NOTES
Oncology Follow-up Visit    Reason for Visit:  Carmelita is a 72 year old woman with a diagnosis of multiple myeloma, who I am visiting with today for routine follow-up and consideration of ongoing therapy.     Nursing Note and documentation reviewed: Yes    Interval History:   Recall, I saw patient last week as she was due to commence C2. She was recovering from a respiratory illness and we decided on an additional week off to recover which patient was very appreciative of. Today, she notes that she is feeling better. Still a little stuffy and intermittent cough but otherwise feeling well. No fevers. No chest pain or SOB. No bleeding concerns. No nausea or vomiting and eating well. Bowels regular. Energy levels good as she is resting as needed.     Oncologic History  12/31/2018 Presented to the emergency room with vertigo and fatigue.  CT scan of the head was negative and subsequent stress test was negative.  05/03/2019 PCP ordered lab work and noted a total protein of 12.9.  SPEP at that time showed an M spike of 6.2 with a large monoclonal protein seen in the gamma fraction. Urine immunofixation showed a possible small protein band in the gamma fraction  05/31/2019 Evaluated by Dr. Walker with Medical Oncology with plan to rule out myeloma and obtain bone marrow aspiration biopsy as well as a metastatic bone survey along with additional labwork  06/18/2019 Underwent bone marrow aspiration and biopsy  06/24/2019 Seen again by Dr. Walker and CBC showed a hemoglobin of 9.3, M spike 7.3 with monoclonal IgG immunoglobulin of kappa light chain type; serum viscosity was 2.9; quantitative immunoglobulins showed an IgG of 8160, beta-2 microglobulin was 5.8, BUN was 21 with creatinine is 0.8 and total protein was 13.7.  Quantitative kappa/lambda free light chains showed an elevated ratio of 17.0 bone marrow aspiration biopsy showed plasma cell myeloma with approximately 80% plasma cells.  Immunofixation showed IgG kappa  and flow cytometry revealed kappa monotypic plasma cells consistent with clonal plasma cell neoplasm and FISH panel was pending at that time. It was felt she had at least stage II disease based on her beta-2 microglobulin and anemia. Plan was to treat with Velcade 1.3 mg per/m2 days 1, 4, 8 and 11/Decadron 40 mg on days 1, 8 and 15. Initiation of Revlimid with C2 at 25 mg daily days 1 through 14.  Plan was to also obtain an MRI of the lumbar spine to rule out lytic lesion at L3.  She was initiated on Zovirax 400 mg p.o. twice daily.  06/25/2019 C1 of chemotherapy initiated  07/01/2019 Note in chart regarding patient's large co-pay for the Revlimid and no plan at this point to initiate Revlimid and treat only with Velcade and Decadron per Dr. Walker  07/11/2019 MRI lumbar spine shows a pathologic superior endplate compression fracture at L3 without evidence of retropulsed fragment and innumerable enhancing lesions throughout the lumbar spine consistent with history of multiple myeloma.  09/10/2019 Increasing M-spike and kappa lambda ratio  10/01/2019 Initiation of CyBorD  11/17/2020 Plateau of M-spike at 1.2 after 36 cycles of CyBorD  12/01/2020 Initiation of Darzalex Faspro injection  03/23/2021 M-spike increased form 1.0 to 1.2 and velcade and dexamethasone added to plan  04/12/2022 Treatment HOLIDAY commenced with Mspike 0.3 after 22 cycles of treatent  05/27/2022 Received Evusheld while on treatment holiday.   07/18/2023 Mspike 0.8. Other labs stable. PET scan ordered.   08/02/2023 PET completed showing negative study. No evidence of mass or lymphadenopathy. No concerning hypermetabolism is present.  08/14/2023 Met with Dr. Walker. Given biochemical progression, he did feel appropriate to resume therapy again with Darzalex faspro      Current Chemo Regime/TX: Darzalex faspro injection 1800mg subcutaneous per protocol  Current Cycle: 2  Completed cycles: 1  **C1/C2 Days 1, 8, 15, 22     Previous treatment:    "Velcade 1.3 mg/m2 days 1, 4, 8 and 11 with Decadron 40 mg days 1, 8 and 15 x 4 cycles;   Velcade 1.5mg.m2/cyclophosphamide 150mg every 7 days on days 1,8,15 and 22/decadron 40mg days 1,8,15,22 ; Darzalex faspro injection 1800mg subcutaneous per protocol    Past Medical History:   Diagnosis Date    Arthritis     Cyst of breast     Depressive disorder     Diabetes mellitus, type 2 (H) 1/18/2021    Essential hypertension 10/1/2015    Major depressive disorder, recurrent episode, mild (H) 10/1/2015    Mixed hyperlipidemia 10/1/2015    Multiple myeloma not having achieved remission (H) 6/24/2019    Other specified disorders of bladder 07/09/2012    Irritable Bladder    Seasonal allergies 10/1/2015    Unspecified essential hypertension 03/19/2007    Unspecified sinusitis (chronic) 09/05/2007       Past Surgical History:   Procedure Laterality Date    APPENDECTOMY      BONE MARROW BIOPSY, BONE SPECIMEN, NEEDLE/TROCAR N/A 6/18/2019    Procedure: BONE MARROW BIOPSY;  Surgeon: Maciej Sanz MD;  Location: [facility] OR    CHOLECYSTECTOMY      COLONOSCOPY  07-    repeat 10 years    COLONOSCOPY N/A 12/30/2016    Procedure: COLONOSCOPY;  Surgeon: Bhaskar Franklin DO;  Location: [facility] OR    PUNC/ASPIR BREAST CYST Left 1995    benign    SINUS SURGERY      TUBAL STERILIZATION         Family History   Problem Relation Age of Onset    Breast Cancer Mother 60        Cause of Death    Parkinsonism Father         \"Possible\"    Coronary Artery Disease Father     Pacemaker Father     Thyroid Disease Daughter     Breast Cancer Maternal Aunt         over 50    Diabetes No family hx of     Hypertension No family hx of     Hyperlipidemia No family hx of     Cerebrovascular Disease No family hx of     Colon Cancer No family hx of     Prostate Cancer No family hx of     Genetic Disorder No family hx of     Asthma No family hx of     Anesthesia Reaction No family hx of        Social History     Socioeconomic History    Marital status: "      Spouse name: Not on file    Number of children: Not on file    Years of education: Not on file    Highest education level: Not on file   Occupational History    Occupation: Financial     Comment:  - (FT)   Tobacco Use    Smoking status: Never    Smokeless tobacco: Never    Tobacco comments:     Tried to Quit (YES); QUIT in 1971; Passive Exposure (NO)   Vaping Use    Vaping Use: Never used   Substance and Sexual Activity    Alcohol use: Yes     Comment: RARELY    Drug use: No    Sexual activity: Yes     Partners: Male     Birth control/protection: None   Other Topics Concern     Service Not Asked    Blood Transfusions Not Asked    Caffeine Concern Yes     Comment: Coffee >6 cups daily    Occupational Exposure Not Asked    Hobby Hazards Not Asked    Sleep Concern Not Asked    Stress Concern Not Asked    Weight Concern Not Asked    Special Diet Not Asked    Back Care Not Asked    Exercise Not Asked    Bike Helmet Not Asked    Seat Belt Not Asked    Self-Exams Not Asked    Parent/sibling w/ CABG, MI or angioplasty before 65F 55M? No   Social History Narrative    Not on file     Social Determinants of Health     Financial Resource Strain: Not on file   Food Insecurity: Not on file   Transportation Needs: Not on file   Physical Activity: Not on file   Stress: Not on file   Social Connections: Not on file   Interpersonal Safety: Not on file   Housing Stability: Not on file       Current Outpatient Medications   Medication    acyclovir (ZOVIRAX) 400 MG tablet    aspirin (ASA) 81 MG chewable tablet    atorvastatin (LIPITOR) 20 MG tablet    diphenhydrAMINE (BENADRYL) 25 MG capsule    fluticasone (FLONASE) 50 MCG/ACT nasal spray    L-Lysine HCl 500 MG CAPS    losartan (COZAAR) 50 MG tablet    sertraline (ZOLOFT) 50 MG tablet     No current facility-administered medications for this visit.     Facility-Administered Medications Ordered in Other Visits   Medication    acetaminophen (TYLENOL)  "tablet 650 mg    daratumumab-hyaluronidase-fihj (DARZALEX FASPRO) SUBCUTANEOUS injection 1,800 mg    dexAMETHasone (DECADRON) tablet 12 mg    diphenhydrAMINE (BENADRYL) capsule 50 mg    montelukast (SINGULAIR) tablet 10 mg        Allergies   Allergen Reactions    Lisinopril Cough    Phenylephrine Hcl Other (See Comments)     **Entex  HEADACHE (SEVERE)    Phenylpropanolamine Other (See Comments)     **Entex  HEADACHE (SEVERE)    Pseudoephedrine Tannate Other (See Comments)     **Entex  HEADACHE (SEVERE)    Levofloxacin Rash     **Levaquin    Moxifloxacin Hcl [Moxifloxacin Hcl In Nacl] Rash       Review Of Systems:  A complete review of systems is negative except for the above mentioned items in the interval history.     Physical Exam:  /68   Pulse 86   Temp 98.1  F (36.7  C) (Tympanic)   Ht 1.6 m (5' 3\")   Wt 75.2 kg (165 lb 12.6 oz)   SpO2 96%   BMI 29.37 kg/m    GENERAL APPEARANCE: Healthy, alert and in no acute distress.  HEENT: Eyes appear normal without scleral icterus. Extraocular movements intact.   NECK:   Supple with normal range of motion. No asymmetry or masses.  LYMPHATICS: No palpable cervical, supraclavicular nodes.  RESP: Lungs clear to auscultation bilaterally, respirations regular and easy.  CARDIOVASCULAR: Regular rate and rhythm. Normal S1, S2; no murmur, gallop, or rub.  MUSCULOSKELETAL: Extremities without gross deformities noted.   SKIN: No suspicious lesions or rashes.  NEURO: Alert and oriented x 3.  Gait steady.  PSYCHIATRIC: Mentation and affect appear normal.  Mood appropriate.      Laboratory:  Results for orders placed or performed in visit on 09/26/23   CBC with platelets and differential     Status: None   Result Value Ref Range    WBC Count 5.8 4.0 - 11.0 10e3/uL    RBC Count 4.50 3.80 - 5.20 10e6/uL    Hemoglobin 13.5 11.7 - 15.7 g/dL    Hematocrit 40.6 35.0 - 47.0 %    MCV 90 78 - 100 fL    MCH 30.0 26.5 - 33.0 pg    MCHC 33.3 31.5 - 36.5 g/dL    RDW 13.9 10.0 - 15.0 %    " Platelet Count 204 150 - 450 10e3/uL    % Neutrophils 48 %    % Lymphocytes 32 %    % Monocytes 12 %    % Eosinophils 6 %    % Basophils 1 %    % Immature Granulocytes 1 %    NRBCs per 100 WBC 0 <1 /100    Absolute Neutrophils 2.8 1.6 - 8.3 10e3/uL    Absolute Lymphocytes 1.8 0.8 - 5.3 10e3/uL    Absolute Monocytes 0.7 0.0 - 1.3 10e3/uL    Absolute Eosinophils 0.3 0.0 - 0.7 10e3/uL    Absolute Basophils 0.0 0.0 - 0.2 10e3/uL    Absolute Immature Granulocytes 0.1 <=0.4 10e3/uL    Absolute NRBCs 0.0 10e3/uL   CBC with platelets and differential     Status: None    Narrative    The following orders were created for panel order CBC with platelets and differential.  Procedure                               Abnormality         Status                     ---------                               -----------         ------                     CBC with platelets and d...[[id number]]                      Final result                 Please view results for these tests on the individual orders.     Imaging Studies:  None this visit.     ASSESSMENT/PLAN:  #1 Multiple myeloma IgG kappa multiple myeloma with 80% involvement of the bone marrow diagnosed June 2019 initially treated with Velcade and Decadron and received 4 cycles with increasing M-spike and kappa/lambda ratio.  Treatment changed to CyBorD on 10/1/2019.  M-spike plateau at 1.2 and treatment changed to monotherapy with Darzalex Faspro injection. M spike declined to 1.0 then increased again to 1.2 so Velcade and Dex added to her treatment plan with cycle 5 therapy. Following C22 (April 2022), she did initiate a treatment holiday when her Mspike was 0.3. We had followed her counts for over a year. Unfortunately, most recently, she did see a rise in her mspike to 0.8. PET completed was negative. Dr. Walker felt this was a biochemical progression and after discussion with patient, elected to resume treatment with Darzalex faspro.     Today, she returns again to commence C2  therapy. Recall she did have slight improvement in light chain and mspike following C1. Overall, she is feeling better this week. I therefore see no contraindications to proceeding with C2 therapy. Per package inset, we should premedicate prior to each dose so I did add premedications back into her plan. She will continue with Days 8, 15, and 22 injection with labs Day 22. I will see her back in 4 weeks prior to starting C3. Sooner with concerns.     #2 Lytic lesions She remains on calcium with vitamin D twice a day. Weight bearing activity. Her next dental exam is scheduled in about 6 weeks. At this point, we will hold Zometa until after that exam. After that dental exam, we can resume.           Patient in agreement with plan and verbalizes understanding. Agrees to call with any questions or concerns.    36 minutes spent in the patient's encounter today with time spent in review of patient's chart along with chart preparation and review of the treatment plan and signing of treatment plan.  Time was also spent with the patient in obtaining a review of systems and performing a physical exam along with detailed review of all test results. Time was also spent in discussing plan for future follow-up and relating instructions for follow-up and in placing future orders.    FLO Duque, FNP-C

## 2023-09-26 ENCOUNTER — INFUSION THERAPY VISIT (OUTPATIENT)
Dept: INFUSION THERAPY | Facility: OTHER | Age: 73
End: 2023-09-26
Attending: PHYSICIAN ASSISTANT
Payer: MEDICARE

## 2023-09-26 ENCOUNTER — ONCOLOGY VISIT (OUTPATIENT)
Dept: ONCOLOGY | Facility: OTHER | Age: 73
End: 2023-09-26
Attending: NURSE PRACTITIONER
Payer: MEDICARE

## 2023-09-26 ENCOUNTER — APPOINTMENT (OUTPATIENT)
Dept: LAB | Facility: OTHER | Age: 73
End: 2023-09-26
Attending: NURSE PRACTITIONER
Payer: MEDICARE

## 2023-09-26 VITALS
HEART RATE: 86 BPM | BODY MASS INDEX: 29.38 KG/M2 | DIASTOLIC BLOOD PRESSURE: 68 MMHG | OXYGEN SATURATION: 96 % | WEIGHT: 165.79 LBS | TEMPERATURE: 98.1 F | HEIGHT: 63 IN | SYSTOLIC BLOOD PRESSURE: 122 MMHG

## 2023-09-26 DIAGNOSIS — M89.9 BONE LESION: ICD-10-CM

## 2023-09-26 DIAGNOSIS — C90.00 MULTIPLE MYELOMA NOT HAVING ACHIEVED REMISSION (H): Primary | ICD-10-CM

## 2023-09-26 LAB
BASOPHILS # BLD AUTO: 0 10E3/UL (ref 0–0.2)
BASOPHILS NFR BLD AUTO: 1 %
EOSINOPHIL # BLD AUTO: 0.3 10E3/UL (ref 0–0.7)
EOSINOPHIL NFR BLD AUTO: 6 %
ERYTHROCYTE [DISTWIDTH] IN BLOOD BY AUTOMATED COUNT: 13.9 % (ref 10–15)
HCT VFR BLD AUTO: 40.6 % (ref 35–47)
HGB BLD-MCNC: 13.5 G/DL (ref 11.7–15.7)
IMM GRANULOCYTES # BLD: 0.1 10E3/UL
IMM GRANULOCYTES NFR BLD: 1 %
LYMPHOCYTES # BLD AUTO: 1.8 10E3/UL (ref 0.8–5.3)
LYMPHOCYTES NFR BLD AUTO: 32 %
MCH RBC QN AUTO: 30 PG (ref 26.5–33)
MCHC RBC AUTO-ENTMCNC: 33.3 G/DL (ref 31.5–36.5)
MCV RBC AUTO: 90 FL (ref 78–100)
MONOCYTES # BLD AUTO: 0.7 10E3/UL (ref 0–1.3)
MONOCYTES NFR BLD AUTO: 12 %
NEUTROPHILS # BLD AUTO: 2.8 10E3/UL (ref 1.6–8.3)
NEUTROPHILS NFR BLD AUTO: 48 %
NRBC # BLD AUTO: 0 10E3/UL
NRBC BLD AUTO-RTO: 0 /100
PLATELET # BLD AUTO: 204 10E3/UL (ref 150–450)
RBC # BLD AUTO: 4.5 10E6/UL (ref 3.8–5.2)
WBC # BLD AUTO: 5.8 10E3/UL (ref 4–11)

## 2023-09-26 PROCEDURE — 85025 COMPLETE CBC W/AUTO DIFF WBC: CPT | Mod: ZL | Performed by: NURSE PRACTITIONER

## 2023-09-26 PROCEDURE — G0463 HOSPITAL OUTPT CLINIC VISIT: HCPCS | Mod: 25

## 2023-09-26 PROCEDURE — 99214 OFFICE O/P EST MOD 30 MIN: CPT | Performed by: NURSE PRACTITIONER

## 2023-09-26 PROCEDURE — G0463 HOSPITAL OUTPT CLINIC VISIT: HCPCS

## 2023-09-26 PROCEDURE — 96401 CHEMO ANTI-NEOPL SQ/IM: CPT

## 2023-09-26 PROCEDURE — 36415 COLL VENOUS BLD VENIPUNCTURE: CPT | Mod: ZL | Performed by: NURSE PRACTITIONER

## 2023-09-26 PROCEDURE — 250N000011 HC RX IP 250 OP 636: Mod: JZ | Performed by: NURSE PRACTITIONER

## 2023-09-26 RX ORDER — DEXAMETHASONE 4 MG/1
12 TABLET ORAL ONCE
Status: CANCELLED | OUTPATIENT
Start: 2023-10-17

## 2023-09-26 RX ORDER — ACETAMINOPHEN 325 MG/1
650 TABLET ORAL ONCE
Status: CANCELLED | OUTPATIENT
Start: 2023-10-10

## 2023-09-26 RX ORDER — DIPHENHYDRAMINE HYDROCHLORIDE 50 MG/ML
50 INJECTION INTRAMUSCULAR; INTRAVENOUS
Status: CANCELLED
Start: 2023-09-26

## 2023-09-26 RX ORDER — DIPHENHYDRAMINE HCL 50 MG
50 CAPSULE ORAL ONCE
Status: CANCELLED | OUTPATIENT
Start: 2023-10-17

## 2023-09-26 RX ORDER — MONTELUKAST SODIUM 10 MG/1
10 TABLET ORAL ONCE
Status: CANCELLED | OUTPATIENT
Start: 2023-10-03

## 2023-09-26 RX ORDER — LORAZEPAM 2 MG/ML
0.5 INJECTION INTRAMUSCULAR EVERY 4 HOURS PRN
Status: CANCELLED | OUTPATIENT
Start: 2023-09-26

## 2023-09-26 RX ORDER — DIPHENHYDRAMINE HCL 50 MG
50 CAPSULE ORAL
Status: CANCELLED | OUTPATIENT
Start: 2023-09-26

## 2023-09-26 RX ORDER — DEXAMETHASONE 4 MG/1
12 TABLET ORAL
Status: CANCELLED | OUTPATIENT
Start: 2023-09-26

## 2023-09-26 RX ORDER — DIPHENHYDRAMINE HCL 50 MG
50 CAPSULE ORAL ONCE
Status: CANCELLED | OUTPATIENT
Start: 2023-09-26

## 2023-09-26 RX ORDER — DEXAMETHASONE 4 MG/1
12 TABLET ORAL ONCE
Status: CANCELLED | OUTPATIENT
Start: 2023-10-10

## 2023-09-26 RX ORDER — ACETAMINOPHEN 325 MG/1
650 TABLET ORAL ONCE
Status: CANCELLED | OUTPATIENT
Start: 2023-10-03

## 2023-09-26 RX ORDER — EPINEPHRINE 1 MG/ML
0.3 INJECTION, SOLUTION, CONCENTRATE INTRAVENOUS EVERY 5 MIN PRN
Status: CANCELLED | OUTPATIENT
Start: 2023-09-26

## 2023-09-26 RX ORDER — DEXAMETHASONE 4 MG/1
12 TABLET ORAL ONCE
Status: COMPLETED | OUTPATIENT
Start: 2023-09-26 | End: 2023-09-26

## 2023-09-26 RX ORDER — DIPHENHYDRAMINE HCL 50 MG
50 CAPSULE ORAL ONCE
Status: COMPLETED | OUTPATIENT
Start: 2023-09-26 | End: 2023-09-26

## 2023-09-26 RX ORDER — MONTELUKAST SODIUM 10 MG/1
10 TABLET ORAL ONCE
Status: CANCELLED | OUTPATIENT
Start: 2023-09-26

## 2023-09-26 RX ORDER — DEXAMETHASONE 4 MG/1
12 TABLET ORAL ONCE
Status: CANCELLED | OUTPATIENT
Start: 2023-09-26

## 2023-09-26 RX ORDER — ACETAMINOPHEN 325 MG/1
650 TABLET ORAL
Status: CANCELLED | OUTPATIENT
Start: 2023-09-26

## 2023-09-26 RX ORDER — MONTELUKAST SODIUM 10 MG/1
10 TABLET ORAL ONCE
Status: CANCELLED | OUTPATIENT
Start: 2023-10-17

## 2023-09-26 RX ORDER — HEPARIN SODIUM (PORCINE) LOCK FLUSH IV SOLN 100 UNIT/ML 100 UNIT/ML
5 SOLUTION INTRAVENOUS
Status: CANCELLED | OUTPATIENT
Start: 2023-09-26

## 2023-09-26 RX ORDER — ACETAMINOPHEN 325 MG/1
650 TABLET ORAL ONCE
Status: CANCELLED | OUTPATIENT
Start: 2023-10-17

## 2023-09-26 RX ORDER — ALBUTEROL SULFATE 90 UG/1
1-2 AEROSOL, METERED RESPIRATORY (INHALATION)
Status: CANCELLED
Start: 2023-09-26

## 2023-09-26 RX ORDER — HEPARIN SODIUM,PORCINE 10 UNIT/ML
5-20 VIAL (ML) INTRAVENOUS DAILY PRN
Status: CANCELLED | OUTPATIENT
Start: 2023-09-26

## 2023-09-26 RX ORDER — MONTELUKAST SODIUM 10 MG/1
10 TABLET ORAL ONCE
Status: CANCELLED | OUTPATIENT
Start: 2023-10-10

## 2023-09-26 RX ORDER — MEPERIDINE HYDROCHLORIDE 25 MG/ML
25 INJECTION INTRAMUSCULAR; INTRAVENOUS; SUBCUTANEOUS EVERY 30 MIN PRN
Status: CANCELLED | OUTPATIENT
Start: 2023-09-26

## 2023-09-26 RX ORDER — DEXAMETHASONE 4 MG/1
12 TABLET ORAL ONCE
Status: CANCELLED | OUTPATIENT
Start: 2023-10-03

## 2023-09-26 RX ORDER — DIPHENHYDRAMINE HCL 50 MG
50 CAPSULE ORAL ONCE
Status: CANCELLED | OUTPATIENT
Start: 2023-10-10

## 2023-09-26 RX ORDER — ACETAMINOPHEN 325 MG/1
650 TABLET ORAL ONCE
Status: COMPLETED | OUTPATIENT
Start: 2023-09-26 | End: 2023-09-26

## 2023-09-26 RX ORDER — DIPHENHYDRAMINE HCL 50 MG
50 CAPSULE ORAL ONCE
Status: CANCELLED | OUTPATIENT
Start: 2023-10-03

## 2023-09-26 RX ORDER — METHYLPREDNISOLONE SODIUM SUCCINATE 125 MG/2ML
125 INJECTION, POWDER, LYOPHILIZED, FOR SOLUTION INTRAMUSCULAR; INTRAVENOUS
Status: CANCELLED
Start: 2023-09-26

## 2023-09-26 RX ORDER — MONTELUKAST SODIUM 10 MG/1
10 TABLET ORAL ONCE
Status: COMPLETED | OUTPATIENT
Start: 2023-09-26 | End: 2023-09-26

## 2023-09-26 RX ORDER — ALBUTEROL SULFATE 0.83 MG/ML
2.5 SOLUTION RESPIRATORY (INHALATION)
Status: CANCELLED | OUTPATIENT
Start: 2023-09-26

## 2023-09-26 RX ORDER — ACETAMINOPHEN 325 MG/1
650 TABLET ORAL ONCE
Status: CANCELLED | OUTPATIENT
Start: 2023-09-26

## 2023-09-26 RX ADMIN — Medication 50 MG: at 09:15

## 2023-09-26 RX ADMIN — DEXAMETHASONE 12 MG: 4 TABLET ORAL at 09:15

## 2023-09-26 RX ADMIN — DARATUMUMAB AND HYALURONIDASE-FIHJ (HUMAN RECOMBINANT) 1800 MG: 1800; 30000 INJECTION SUBCUTANEOUS at 10:31

## 2023-09-26 RX ADMIN — ACETAMINOPHEN 650 MG: 325 TABLET ORAL at 09:15

## 2023-09-26 RX ADMIN — MONTELUKAST SODIUM 10 MG: 10 TABLET ORAL at 09:15

## 2023-09-26 ASSESSMENT — PAIN SCALES - GENERAL: PAINLEVEL: NO PAIN (0)

## 2023-09-26 NOTE — NURSING NOTE
"Oncology Rooming Note    September 26, 2023 8:11 AM   Carmelita Watts is a 72 year old female who presents for:    Chief Complaint   Patient presents with    Oncology Clinic Visit     Follow up. Multiple myeloma not having achieved remission      Initial Vitals: /68   Pulse 86   Temp 98.1  F (36.7  C) (Tympanic)   Ht 1.6 m (5' 3\")   Wt 75.2 kg (165 lb 12.6 oz)   SpO2 96%   BMI 29.37 kg/m   Estimated body mass index is 29.37 kg/m  as calculated from the following:    Height as of this encounter: 1.6 m (5' 3\").    Weight as of this encounter: 75.2 kg (165 lb 12.6 oz). Body surface area is 1.83 meters squared.  No Pain (0) Comment: Data Unavailable   No LMP recorded. Patient is postmenopausal.  Allergies reviewed: Yes  Medications reviewed: Yes    Medications: Medication refills not needed today.  Pharmacy name entered into [facility]:    [id number] - [place], [place] - [address]  AT [facility] [address] [address]  [facility] - [place], [place] - [address] [facility] - [place] - [address]  [facility]    Clinical concerns: none       Julia Andrade LPN              "

## 2023-09-26 NOTE — PROGRESS NOTES
Infusion Nursing Note:  Carmelita Watts presents today for faspro.    Patient seen by provider today: Yes: Iram Gonzalez   present during visit today: Not Applicable.    Note: N/A.      Intravenous Access:  Labs drawn by other staff.    Treatment Conditions:  Lab Results   Component Value Date    HGB 13.5 09/26/2023    WBC 5.8 09/26/2023    ANEU 1.0 (L) 01/03/2023    ANEUTAUTO 2.8 09/26/2023     09/26/2023        Lab Results   Component Value Date     08/14/2023    POTASSIUM 3.7 08/14/2023    CR 0.81 08/14/2023    PAYAL 9.8 08/14/2023    BILITOTAL 0.3 08/14/2023    ALBUMIN 4.1 08/14/2023    ALT 15 08/14/2023    AST 21 08/14/2023       Results reviewed, labs MET treatment parameters, ok to proceed with treatment.      Post Infusion Assessment:  Patient tolerated injection without incident.       Discharge Plan:   Discharge instructions reviewed with: Patient.      TERRENCE GUSMAN

## 2023-09-26 NOTE — PATIENT INSTRUCTIONS
Follow-up as scheduled. For the remainder of this cycle (which is cycle two) we will continue weekly shots but starting next cycle, we will go to every other.     I will see you back in 4 weeks with labs prior as scheduled.

## 2023-10-03 ENCOUNTER — INFUSION THERAPY VISIT (OUTPATIENT)
Dept: INFUSION THERAPY | Facility: OTHER | Age: 73
End: 2023-10-03
Attending: PHYSICIAN ASSISTANT
Payer: MEDICARE

## 2023-10-03 VITALS
WEIGHT: 167.11 LBS | DIASTOLIC BLOOD PRESSURE: 80 MMHG | OXYGEN SATURATION: 93 % | HEART RATE: 87 BPM | RESPIRATION RATE: 18 BRPM | BODY MASS INDEX: 29.6 KG/M2 | TEMPERATURE: 97.8 F | SYSTOLIC BLOOD PRESSURE: 114 MMHG

## 2023-10-03 DIAGNOSIS — C90.00 MULTIPLE MYELOMA NOT HAVING ACHIEVED REMISSION (H): Primary | ICD-10-CM

## 2023-10-03 LAB
BASO+EOS+MONOS # BLD AUTO: NORMAL 10*3/UL
BASO+EOS+MONOS NFR BLD AUTO: NORMAL %
BASOPHILS # BLD AUTO: 0 10E3/UL (ref 0–0.2)
BASOPHILS NFR BLD AUTO: 1 %
EOSINOPHIL # BLD AUTO: 0.3 10E3/UL (ref 0–0.7)
EOSINOPHIL NFR BLD AUTO: 5 %
ERYTHROCYTE [DISTWIDTH] IN BLOOD BY AUTOMATED COUNT: 14.2 % (ref 10–15)
HCT VFR BLD AUTO: 42.1 % (ref 35–47)
HGB BLD-MCNC: 14.4 G/DL (ref 11.7–15.7)
IMM GRANULOCYTES # BLD: 0.1 10E3/UL
IMM GRANULOCYTES NFR BLD: 1 %
LYMPHOCYTES # BLD AUTO: 1.5 10E3/UL (ref 0.8–5.3)
LYMPHOCYTES NFR BLD AUTO: 26 %
MCH RBC QN AUTO: 30.8 PG (ref 26.5–33)
MCHC RBC AUTO-ENTMCNC: 34.2 G/DL (ref 31.5–36.5)
MCV RBC AUTO: 90 FL (ref 78–100)
MONOCYTES # BLD AUTO: 0.9 10E3/UL (ref 0–1.3)
MONOCYTES NFR BLD AUTO: 15 %
NEUTROPHILS # BLD AUTO: 3 10E3/UL (ref 1.6–8.3)
NEUTROPHILS NFR BLD AUTO: 52 %
NRBC # BLD AUTO: 0 10E3/UL
NRBC BLD AUTO-RTO: 0 /100
PLATELET # BLD AUTO: 194 10E3/UL (ref 150–450)
RBC # BLD AUTO: 4.67 10E6/UL (ref 3.8–5.2)
WBC # BLD AUTO: 5.8 10E3/UL (ref 4–11)

## 2023-10-03 PROCEDURE — 96401 CHEMO ANTI-NEOPL SQ/IM: CPT

## 2023-10-03 PROCEDURE — 85025 COMPLETE CBC W/AUTO DIFF WBC: CPT | Mod: ZL | Performed by: INTERNAL MEDICINE

## 2023-10-03 PROCEDURE — 250N000011 HC RX IP 250 OP 636: Mod: JZ | Performed by: INTERNAL MEDICINE

## 2023-10-03 PROCEDURE — 36415 COLL VENOUS BLD VENIPUNCTURE: CPT | Mod: ZL | Performed by: INTERNAL MEDICINE

## 2023-10-03 RX ORDER — MONTELUKAST SODIUM 10 MG/1
10 TABLET ORAL ONCE
Status: COMPLETED | OUTPATIENT
Start: 2023-10-03 | End: 2023-10-03

## 2023-10-03 RX ORDER — DEXAMETHASONE 4 MG/1
12 TABLET ORAL ONCE
Status: COMPLETED | OUTPATIENT
Start: 2023-10-03 | End: 2023-10-03

## 2023-10-03 RX ORDER — ACETAMINOPHEN 325 MG/1
650 TABLET ORAL ONCE
Status: COMPLETED | OUTPATIENT
Start: 2023-10-03 | End: 2023-10-03

## 2023-10-03 RX ADMIN — MONTELUKAST SODIUM 10 MG: 10 TABLET ORAL at 10:23

## 2023-10-03 RX ADMIN — ACETAMINOPHEN 650 MG: 325 TABLET ORAL at 10:23

## 2023-10-03 RX ADMIN — DEXAMETHASONE 12 MG: 4 TABLET ORAL at 10:22

## 2023-10-03 RX ADMIN — DARATUMUMAB AND HYALURONIDASE-FIHJ (HUMAN RECOMBINANT) 1800 MG: 1800; 30000 INJECTION SUBCUTANEOUS at 10:43

## 2023-10-03 ASSESSMENT — PAIN SCALES - GENERAL: PAINLEVEL: NO PAIN (0)

## 2023-10-03 NOTE — PROGRESS NOTES
Infusion Nursing Note:  Carmelita MICHAEL Mikemaurice presents today for FASPRO.    Patient seen by provider today: No   present during visit today: Not Applicable.    Note: Patient noting she took Benadryl 25mg PO at home this am (0730) for allergy type symptoms. This is not unusual for her. However she has orders for 50mg Benadryl as premed today. Reached out to pharmacy, Sang re potential redose. She had concerns over anticholinergic effect in this patient and wanted provider to weigh in. Patient saw Nida Gonzalez NP ONC last week. Secure chat with her and Sang. Per Nida, no need to repeat Benadryl, home dose is sufficient. Patient updated and agrees with proceeding.       Intravenous Access:  Lab draw site left antecubital.  Labs drawn without difficulty.    Treatment Conditions:  Lab Results   Component Value Date    HGB 14.4 10/03/2023    WBC 5.8 10/03/2023    ANEU 1.0 (L) 01/03/2023    ANEUTAUTO 3.0 10/03/2023     10/03/2023      Meets parameters. See flowsheet re cough/allergy sx. Afebrile, lungs clear.       Post Infusion Assessment:  Patient tolerated injection without incident. Administered to right upper quadrant of abd.       Discharge Plan:   Patient discharged in stable condition accompanied by: self.  Departure Mode: Ambulatory.

## 2023-10-10 ENCOUNTER — INFUSION THERAPY VISIT (OUTPATIENT)
Dept: INFUSION THERAPY | Facility: OTHER | Age: 73
End: 2023-10-10
Attending: PHYSICIAN ASSISTANT
Payer: MEDICARE

## 2023-10-10 VITALS
OXYGEN SATURATION: 95 % | WEIGHT: 168.43 LBS | SYSTOLIC BLOOD PRESSURE: 117 MMHG | RESPIRATION RATE: 18 BRPM | HEART RATE: 86 BPM | DIASTOLIC BLOOD PRESSURE: 83 MMHG | TEMPERATURE: 98.4 F | BODY MASS INDEX: 29.84 KG/M2

## 2023-10-10 DIAGNOSIS — Z23 NEED FOR PROPHYLACTIC VACCINATION AND INOCULATION AGAINST INFLUENZA: ICD-10-CM

## 2023-10-10 DIAGNOSIS — C90.00 MULTIPLE MYELOMA NOT HAVING ACHIEVED REMISSION (H): Primary | ICD-10-CM

## 2023-10-10 LAB
BASO+EOS+MONOS # BLD AUTO: NORMAL 10*3/UL
BASO+EOS+MONOS NFR BLD AUTO: NORMAL %
BASOPHILS # BLD AUTO: 0.1 10E3/UL (ref 0–0.2)
BASOPHILS NFR BLD AUTO: 1 %
EOSINOPHIL # BLD AUTO: 0.4 10E3/UL (ref 0–0.7)
EOSINOPHIL NFR BLD AUTO: 6 %
ERYTHROCYTE [DISTWIDTH] IN BLOOD BY AUTOMATED COUNT: 14.2 % (ref 10–15)
HCT VFR BLD AUTO: 41.9 % (ref 35–47)
HGB BLD-MCNC: 14.1 G/DL (ref 11.7–15.7)
IMM GRANULOCYTES # BLD: 0.1 10E3/UL
IMM GRANULOCYTES NFR BLD: 2 %
LYMPHOCYTES # BLD AUTO: 2.4 10E3/UL (ref 0.8–5.3)
LYMPHOCYTES NFR BLD AUTO: 31 %
MCH RBC QN AUTO: 30.2 PG (ref 26.5–33)
MCHC RBC AUTO-ENTMCNC: 33.7 G/DL (ref 31.5–36.5)
MCV RBC AUTO: 90 FL (ref 78–100)
MONOCYTES # BLD AUTO: 0.7 10E3/UL (ref 0–1.3)
MONOCYTES NFR BLD AUTO: 10 %
NEUTROPHILS # BLD AUTO: 4.1 10E3/UL (ref 1.6–8.3)
NEUTROPHILS NFR BLD AUTO: 50 %
NRBC # BLD AUTO: 0 10E3/UL
NRBC BLD AUTO-RTO: 0 /100
PLATELET # BLD AUTO: 227 10E3/UL (ref 150–450)
RBC # BLD AUTO: 4.67 10E6/UL (ref 3.8–5.2)
WBC # BLD AUTO: 7.8 10E3/UL (ref 4–11)

## 2023-10-10 PROCEDURE — G0008 ADMIN INFLUENZA VIRUS VAC: HCPCS

## 2023-10-10 PROCEDURE — 96401 CHEMO ANTI-NEOPL SQ/IM: CPT

## 2023-10-10 PROCEDURE — 250N000011 HC RX IP 250 OP 636: Mod: JZ | Performed by: INTERNAL MEDICINE

## 2023-10-10 PROCEDURE — 85025 COMPLETE CBC W/AUTO DIFF WBC: CPT | Mod: ZL | Performed by: INTERNAL MEDICINE

## 2023-10-10 PROCEDURE — 36415 COLL VENOUS BLD VENIPUNCTURE: CPT | Mod: ZL | Performed by: INTERNAL MEDICINE

## 2023-10-10 RX ORDER — MONTELUKAST SODIUM 10 MG/1
10 TABLET ORAL ONCE
Status: COMPLETED | OUTPATIENT
Start: 2023-10-10 | End: 2023-10-10

## 2023-10-10 RX ORDER — ACETAMINOPHEN 325 MG/1
650 TABLET ORAL ONCE
Status: COMPLETED | OUTPATIENT
Start: 2023-10-10 | End: 2023-10-10

## 2023-10-10 RX ORDER — DIPHENHYDRAMINE HCL 50 MG
50 CAPSULE ORAL ONCE
Status: COMPLETED | OUTPATIENT
Start: 2023-10-10 | End: 2023-10-10

## 2023-10-10 RX ORDER — DEXAMETHASONE 4 MG/1
12 TABLET ORAL ONCE
Status: COMPLETED | OUTPATIENT
Start: 2023-10-10 | End: 2023-10-10

## 2023-10-10 RX ADMIN — DEXAMETHASONE 12 MG: 4 TABLET ORAL at 09:06

## 2023-10-10 RX ADMIN — DARATUMUMAB AND HYALURONIDASE-FIHJ (HUMAN RECOMBINANT) 1800 MG: 1800; 30000 INJECTION SUBCUTANEOUS at 09:24

## 2023-10-10 RX ADMIN — ACETAMINOPHEN 650 MG: 325 TABLET ORAL at 09:06

## 2023-10-10 RX ADMIN — Medication 50 MG: at 09:06

## 2023-10-10 RX ADMIN — MONTELUKAST SODIUM 10 MG: 10 TABLET ORAL at 09:06

## 2023-10-10 ASSESSMENT — PAIN SCALES - GENERAL: PAINLEVEL: NO PAIN (0)

## 2023-10-10 NOTE — PROGRESS NOTES
Infusion Nursing Note:  Carmelita Watts presents today for FASPRO.    Patient seen by provider today: No   present during visit today: Not Applicable.    Note: N/A.      Intravenous Access:  Lab draw site right antecubital space    Treatment Conditions:  Lab Results   Component Value Date    HGB 14.1 10/10/2023    WBC 7.8 10/10/2023    ANEU 1.0 (L) 01/03/2023    ANEUTAUTO 4.1 10/10/2023     10/10/2023        Results reviewed, labs MET treatment parameters, ok to proceed with treatment.      Post Infusion Assessment:  Patient tolerated injection without incident.   The following medication was given:     MEDICATION: FASPRO  ROUTE: SQ  SITE: [facility]        Discharge Plan:   Patient discharged in stable condition accompanied by: self.  Departure Mode: Ambulatory.

## 2023-10-13 NOTE — PATIENT INSTRUCTIONS
Ears were cleaned  Stable left TM perforation.   No drainage or moisture on exam    Follow up in 6 months      Thank you for allowing VENKATESH Purvis and our ENT team to participate in your care.  If your medications are too expensive, please give the nurse a call.  We can possibly change this medication.  If you have a scheduling or an appointment question please contact our Health Unit Coordinator at their direct line [phone].   ALL nursing questions or concerns can be directed to your ENT nurse, Manuela at: [phone].

## 2023-10-16 ENCOUNTER — OFFICE VISIT (OUTPATIENT)
Dept: OTOLARYNGOLOGY | Facility: OTHER | Age: 73
End: 2023-10-16
Attending: PHYSICIAN ASSISTANT
Payer: COMMERCIAL

## 2023-10-16 VITALS
HEIGHT: 63 IN | DIASTOLIC BLOOD PRESSURE: 62 MMHG | HEART RATE: 86 BPM | SYSTOLIC BLOOD PRESSURE: 102 MMHG | WEIGHT: 163 LBS | TEMPERATURE: 98.4 F | OXYGEN SATURATION: 97 % | BODY MASS INDEX: 28.88 KG/M2

## 2023-10-16 DIAGNOSIS — H90.A21 SENSORINEURAL HEARING LOSS (SNHL) OF RIGHT EAR WITH RESTRICTED HEARING OF LEFT EAR: ICD-10-CM

## 2023-10-16 DIAGNOSIS — H72.92 PERFORATION OF TYMPANIC MEMBRANE, LEFT: Primary | ICD-10-CM

## 2023-10-16 DIAGNOSIS — Z98.890 H/O MYRINGOTOMY: ICD-10-CM

## 2023-10-16 DIAGNOSIS — H90.A32 MIXED CONDUCTIVE AND SENSORINEURAL HEARING LOSS OF LEFT EAR WITH RESTRICTED HEARING OF RIGHT EAR: ICD-10-CM

## 2023-10-16 PROCEDURE — 92504 EAR MICROSCOPY EXAMINATION: CPT | Performed by: PHYSICIAN ASSISTANT

## 2023-10-16 PROCEDURE — G0463 HOSPITAL OUTPT CLINIC VISIT: HCPCS

## 2023-10-16 PROCEDURE — 99213 OFFICE O/P EST LOW 20 MIN: CPT | Mod: 25 | Performed by: PHYSICIAN ASSISTANT

## 2023-10-16 ASSESSMENT — PAIN SCALES - GENERAL: PAINLEVEL: NO PAIN (0)

## 2023-10-16 NOTE — LETTER
10/16/2023         RE: Carmelita Watts  [address] [facility] [address]        Dear Colleague,    Thank you for referring your patient, Carmelita Watts, to the [facility]. Please see a copy of my visit note below.    Chief Complaint   Patient presents with     Ear Problem     Follow up ears.  (Hx. Perforation tympanic membrane left ear)        Patient returns to ENT for follow-up exam.    She has been doing well since her last visit  She will use drops intermittently at times but it does help her symptoms.   She has resumed chemotherapy again this summer and doing fairly well. Tolerating her infusions w/pre treatment.      Today, Carmelita has been doing well overall. She has used drops as directed since her last visit.      Denies otalgia, otorrhea  Denies worrisome tinnitus  Denies fluctuating hearing loss or tinnitus.   Denies vertigo or facial paraesthesia.    Right tube has been absent and right TM intact without effusion.  Left TM does have small perforation noted.   BTTI placed on 9/20/19 by Dr. Ley in office               Past Medical History:   Diagnosis Date     Arthritis      Cyst of breast      Depressive disorder      Diabetes mellitus, type 2 (H) 1/18/2021     Essential hypertension 10/1/2015     Major depressive disorder, recurrent episode, mild (H) 10/1/2015     Mixed hyperlipidemia 10/1/2015     Multiple myeloma not having achieved remission (H) 6/24/2019     Other specified disorders of bladder 07/09/2012    Irritable Bladder     Seasonal allergies 10/1/2015     Unspecified essential hypertension 03/19/2007     Unspecified sinusitis (chronic) 09/05/2007        Allergies   Allergen Reactions     Lisinopril Cough     Phenylephrine Hcl Other (See Comments)     **Entex  HEADACHE (SEVERE)     Phenylpropanolamine Other (See Comments)     **Entex  HEADACHE (SEVERE)     Pseudoephedrine Tannate Other (See Comments)     **Entex  HEADACHE (SEVERE)     Levofloxacin Rash      "**Levaquin     Moxifloxacin Hcl [Moxifloxacin Hcl In Nacl] Rash     Current Outpatient Medications   Medication     acyclovir (ZOVIRAX) 400 MG tablet     aspirin (ASA) 81 MG chewable tablet     atorvastatin (LIPITOR) 20 MG tablet     diphenhydrAMINE (BENADRYL) 25 MG capsule     fluticasone (FLONASE) 50 MCG/ACT nasal spray     L-Lysine HCl 500 MG CAPS     losartan (COZAAR) 50 MG tablet     sertraline (ZOLOFT) 50 MG tablet     No current facility-administered medications for this visit.     ROS- SEE HPI  /62 (BP Location: Right arm, Patient Position: Sitting, Cuff Size: Adult Regular)   Pulse 86   Temp 98.4  F (36.9  C) (Tympanic)   Ht 1.6 m (5' 3\")   Wt 73.9 kg (163 lb)   SpO2 97%   BMI 28.87 kg/m      General - The patient is well nourished and well developed, and appears to have good nutritional status.    Head and Face - Normocephalic and atraumatic, with no gross asymmetry noted of the contour of the facial features.  The facial nerve is intact, with strong symmetric movements.  Eyes - Extraocular movements intact, and the pupils were reactive to light.  Sclera were not icteric or injected, conjunctiva were pink and moist.  Mouth - Examination of the oral cavity shows pink, healthy, moist mucosa.  No lesions or ulceration noted.  The dentition are in good repair.  The tongue is mobile and midline.  Ears - Examination of the ears showed- Right canal clear. Right TM is intact without effusion or perforation. LEFT Ear examined under otologic microscopy.  There is dried debris along inferior floor of canal removed with alligator forceps.   Left TM is with perforation, central anterior about 15%. ME appears healthy   Eyes - Extraocular movements intact, and the pupils were reactive to light.  Sclera were not icteric or injected, conjunctiva were pink and moist.  Mouth - Examination of the oral cavity showed pink, healthy oral mucosa. No lesions or ulcerations noted.  The tongue was mobile and midline, and " the dentition were in good condition.    Throat - The walls of the oropharynx were smooth, pink, moist, symmetric, and had no lesions or ulcerations.  The tonsillar pillars and soft palate were symmetric.  The uvula was midline on elevation.    Neck - Normal midline excursion of the laryngotracheal complex during swallowing.  Full range of motion on passive movement.  Palpation of the occipital, submental, submandibular, internal jugular chain, and supraclavicular nodes did not demonstrate any abnormal lymph nodes or masses.  Palpation of the thyroid was soft and smooth, with no nodules or goiter appreciated.  The trachea was mobile and midline.     ASSESSMENT/ PLAN:               ICD-10-CM    1. Perforation of tympanic membrane, left  H72.92       2. H/O myringotomy w/ tube placement  Z98.890       3. Sensorineural hearing loss (SNHL) of right ear with restricted hearing of left ear  H90.A21       4. Mixed conductive and sensorineural hearing loss of left ear with restricted hearing of right ear  H90.A32             Ears were cleaned  Stable left TM perforation.   No drainage or moisture on exam    Follow up in 6 months      Bridgette Ly PA-C  ENT  [facility] [place]      Again, thank you for allowing me to participate in the care of your patient.        Sincerely,        Bridgette Ly PA-C

## 2023-10-16 NOTE — PROGRESS NOTES
Chief Complaint   Patient presents with    Ear Problem     Follow up ears.  (Hx. Perforation tympanic membrane left ear)        Patient returns to ENT for follow-up exam.    She has been doing well since her last visit  She will use drops intermittently at times but it does help her symptoms.   She has resumed chemotherapy again this summer and doing fairly well. Tolerating her infusions w/pre treatment.      Today, Carmelita has been doing well overall. She has used drops as directed since her last visit.      Denies otalgia, otorrhea  Denies worrisome tinnitus  Denies fluctuating hearing loss or tinnitus.   Denies vertigo or facial paraesthesia.    Right tube has been absent and right TM intact without effusion.  Left TM does have small perforation noted.   BTTI placed on 9/20/19 by Dr. Ley in office               Past Medical History:   Diagnosis Date    Arthritis     Cyst of breast     Depressive disorder     Diabetes mellitus, type 2 (H) 1/18/2021    Essential hypertension 10/1/2015    Major depressive disorder, recurrent episode, mild (H) 10/1/2015    Mixed hyperlipidemia 10/1/2015    Multiple myeloma not having achieved remission (H) 6/24/2019    Other specified disorders of bladder 07/09/2012    Irritable Bladder    Seasonal allergies 10/1/2015    Unspecified essential hypertension 03/19/2007    Unspecified sinusitis (chronic) 09/05/2007        Allergies   Allergen Reactions    Lisinopril Cough    Phenylephrine Hcl Other (See Comments)     **Entex  HEADACHE (SEVERE)    Phenylpropanolamine Other (See Comments)     **Entex  HEADACHE (SEVERE)    Pseudoephedrine Tannate Other (See Comments)     **Entex  HEADACHE (SEVERE)    Levofloxacin Rash     **Levaquin    Moxifloxacin Hcl [Moxifloxacin Hcl In Nacl] Rash     Current Outpatient Medications   Medication    acyclovir (ZOVIRAX) 400 MG tablet    aspirin (ASA) 81 MG chewable tablet    atorvastatin (LIPITOR) 20 MG tablet    diphenhydrAMINE (BENADRYL) 25 MG  "capsule    fluticasone (FLONASE) 50 MCG/ACT nasal spray    L-Lysine HCl 500 MG CAPS    losartan (COZAAR) 50 MG tablet    sertraline (ZOLOFT) 50 MG tablet     No current facility-administered medications for this visit.     ROS- SEE HPI  /62 (BP Location: Right arm, Patient Position: Sitting, Cuff Size: Adult Regular)   Pulse 86   Temp 98.4  F (36.9  C) (Tympanic)   Ht 1.6 m (5' 3\")   Wt 73.9 kg (163 lb)   SpO2 97%   BMI 28.87 kg/m      General - The patient is well nourished and well developed, and appears to have good nutritional status.    Head and Face - Normocephalic and atraumatic, with no gross asymmetry noted of the contour of the facial features.  The facial nerve is intact, with strong symmetric movements.  Eyes - Extraocular movements intact, and the pupils were reactive to light.  Sclera were not icteric or injected, conjunctiva were pink and moist.  Mouth - Examination of the oral cavity shows pink, healthy, moist mucosa.  No lesions or ulceration noted.  The dentition are in good repair.  The tongue is mobile and midline.  Ears - Examination of the ears showed- Right canal clear. Right TM is intact without effusion or perforation. LEFT Ear examined under otologic microscopy.  There is dried debris along inferior floor of canal removed with alligator forceps.   Left TM is with perforation, central anterior about 15%. ME appears healthy   Eyes - Extraocular movements intact, and the pupils were reactive to light.  Sclera were not icteric or injected, conjunctiva were pink and moist.  Mouth - Examination of the oral cavity showed pink, healthy oral mucosa. No lesions or ulcerations noted.  The tongue was mobile and midline, and the dentition were in good condition.    Throat - The walls of the oropharynx were smooth, pink, moist, symmetric, and had no lesions or ulcerations.  The tonsillar pillars and soft palate were symmetric.  The uvula was midline on elevation.    Neck - Normal midline " excursion of the laryngotracheal complex during swallowing.  Full range of motion on passive movement.  Palpation of the occipital, submental, submandibular, internal jugular chain, and supraclavicular nodes did not demonstrate any abnormal lymph nodes or masses.  Palpation of the thyroid was soft and smooth, with no nodules or goiter appreciated.  The trachea was mobile and midline.     ASSESSMENT/ PLAN:               ICD-10-CM    1. Perforation of tympanic membrane, left  H72.92       2. H/O myringotomy w/ tube placement  Z98.890       3. Sensorineural hearing loss (SNHL) of right ear with restricted hearing of left ear  H90.A21       4. Mixed conductive and sensorineural hearing loss of left ear with restricted hearing of right ear  H90.A32             Ears were cleaned  Stable left TM perforation.   No drainage or moisture on exam    Follow up in 6 months      Bridgette Ly PA-C  [facility]  [facility]

## 2023-10-16 NOTE — PROGRESS NOTES
Oncology Follow-up Visit    Reason for Visit:  Carmelita is a 73 year old woman with a diagnosis of multiple myeloma, who I am visiting with today for routine follow-up and consideration of ongoing therapy.     Nursing Note and documentation reviewed: Yes    Interval History:   Carlene notes that she is doing well. No recent signs of infection. NO fever, chills, chest pain, cough, or SOB. No bleeding concerns. No nausea or vomiting unless she eats too much, appetite has been good. No bowel concerns. No skin concerns. Energy levels are good. She recognized her imitations and tries not to overdue it, although notes that she is still walking a mile when feeling well.     Oncologic History  12/31/2018 Presented to the emergency room with vertigo and fatigue.  CT scan of the head was negative and subsequent stress test was negative.  05/03/2019 PCP ordered lab work and noted a total protein of 12.9.  SPEP at that time showed an M spike of 6.2 with a large monoclonal protein seen in the gamma fraction. Urine immunofixation showed a possible small protein band in the gamma fraction  05/31/2019 Evaluated by Dr. Walker with Medical Oncology with plan to rule out myeloma and obtain bone marrow aspiration biopsy as well as a metastatic bone survey along with additional labwork  06/18/2019 Underwent bone marrow aspiration and biopsy  06/24/2019 Seen again by Dr. Walker and CBC showed a hemoglobin of 9.3, M spike 7.3 with monoclonal IgG immunoglobulin of kappa light chain type; serum viscosity was 2.9; quantitative immunoglobulins showed an IgG of 8160, beta-2 microglobulin was 5.8, BUN was 21 with creatinine is 0.8 and total protein was 13.7.  Quantitative kappa/lambda free light chains showed an elevated ratio of 17.0 bone marrow aspiration biopsy showed plasma cell myeloma with approximately 80% plasma cells.  Immunofixation showed IgG kappa and flow cytometry revealed kappa monotypic plasma cells consistent with clonal plasma  cell neoplasm and FISH panel was pending at that time. It was felt she had at least stage II disease based on her beta-2 microglobulin and anemia. Plan was to treat with Velcade 1.3 mg per/m2 days 1, 4, 8 and 11/Decadron 40 mg on days 1, 8 and 15. Initiation of Revlimid with C2 at 25 mg daily days 1 through 14.  Plan was to also obtain an MRI of the lumbar spine to rule out lytic lesion at L3.  She was initiated on Zovirax 400 mg p.o. twice daily.  06/25/2019 C1 of chemotherapy initiated  07/01/2019 Note in chart regarding patient's large co-pay for the Revlimid and no plan at this point to initiate Revlimid and treat only with Velcade and Decadron per Dr. Walker  07/11/2019 MRI lumbar spine shows a pathologic superior endplate compression fracture at L3 without evidence of retropulsed fragment and innumerable enhancing lesions throughout the lumbar spine consistent with history of multiple myeloma.  09/10/2019 Increasing M-spike and kappa lambda ratio  10/01/2019 Initiation of CyBorD  11/17/2020 Plateau of M-spike at 1.2 after 36 cycles of CyBorD  12/01/2020 Initiation of Darzalex Faspro injection  03/23/2021 M-spike increased form 1.0 to 1.2 and velcade and dexamethasone added to plan  04/12/2022 Treatment HOLIDAY commenced with Mspike 0.3 after 22 cycles of treatent  05/27/2022 Received Evusheld while on treatment holiday.   07/18/2023 Mspike 0.8. Other labs stable. PET scan ordered.   08/02/2023 PET completed showing negative study. No evidence of mass or lymphadenopathy. No concerning hypermetabolism is present.  08/14/2023 Met with Dr. Walker. Given biochemical progression, he did feel appropriate to resume therapy again with Darzalex faspro    Current Chemo Regime/TX: Darzalex faspro injection 1800mg subcutaneous per protocol  Current Cycle: 3  Completed cycles: 2  **C1/C2 Days 1, 8, 15, 22  **C3 Days 1, 15     Previous treatment:   Velcade 1.3 mg/m2 days 1, 4, 8 and 11 with Decadron 40 mg days 1, 8 and  "15 x 4 cycles;   Velcade 1.5mg.m2/cyclophosphamide 150mg every 7 days on days 1,8,15 and 22/decadron 40mg days 1,8,15,22 ; Darzalex faspro injection 1800mg subcutaneous per protocol    Past Medical History:   Diagnosis Date    Arthritis     Cyst of breast     Depressive disorder     Diabetes mellitus, type 2 (H) 1/18/2021    Essential hypertension 10/1/2015    Major depressive disorder, recurrent episode, mild (H24) 10/1/2015    Mixed hyperlipidemia 10/1/2015    Multiple myeloma not having achieved remission (H) 6/24/2019    Other specified disorders of bladder 07/09/2012    Irritable Bladder    Seasonal allergies 10/1/2015    Unspecified essential hypertension 03/19/2007    Unspecified sinusitis (chronic) 09/05/2007       Past Surgical History:   Procedure Laterality Date    APPENDECTOMY      BONE MARROW BIOPSY, BONE SPECIMEN, NEEDLE/TROCAR N/A 6/18/2019    Procedure: BONE MARROW BIOPSY;  Surgeon: Maciej Sanz MD;  Location: [facility] OR    CHOLECYSTECTOMY      COLONOSCOPY  07-    repeat 10 years    COLONOSCOPY N/A 12/30/2016    Procedure: COLONOSCOPY;  Surgeon: Bhaskar Franklin DO;  Location: [facility] OR    PUNC/ASPIR BREAST CYST Left 1995    benign    SINUS SURGERY      TUBAL STERILIZATION         Family History   Problem Relation Age of Onset    Breast Cancer Mother 60        Cause of Death    Parkinsonism Father         \"Possible\"    Coronary Artery Disease Father     Pacemaker Father     Thyroid Disease Daughter     Breast Cancer Maternal Aunt         over 50    Diabetes No family hx of     Hypertension No family hx of     Hyperlipidemia No family hx of     Cerebrovascular Disease No family hx of     Colon Cancer No family hx of     Prostate Cancer No family hx of     Genetic Disorder No family hx of     Asthma No family hx of     Anesthesia Reaction No family hx of        Social History     Socioeconomic History    Marital status:      Spouse name: Not on file    Number of children: Not on file    " Years of education: Not on file    Highest education level: Not on file   Occupational History    Occupation: Financial     Comment:  - ([employer])   Tobacco Use    Smoking status: Never    Smokeless tobacco: Never    Tobacco comments:     Tried to Quit (YES); QUIT in 1971; Passive Exposure (NO)   Vaping Use    Vaping Use: Never used   Substance and Sexual Activity    Alcohol use: Yes     Comment: RARELY    Drug use: No    Sexual activity: Yes     Partners: Male     Birth control/protection: None   Other Topics Concern     Service Not Asked    Blood Transfusions Not Asked    Caffeine Concern Yes     Comment: Coffee >6 cups daily    Occupational Exposure Not Asked    Hobby Hazards Not Asked    Sleep Concern Not Asked    Stress Concern Not Asked    Weight Concern Not Asked    Special Diet Not Asked    Back Care Not Asked    Exercise Not Asked    Bike Helmet Not Asked    Seat Belt Not Asked    Self-Exams Not Asked    Parent/sibling w/ CABG, MI or angioplasty before 65F 55M? No   Social History Narrative    Not on file     Social Determinants of Health     Financial Resource Strain: Not on file   Food Insecurity: Not on file   Transportation Needs: Not on file   Physical Activity: Not on file   Stress: Not on file   Social Connections: Not on file   Interpersonal Safety: Not on file   Housing Stability: Not on file       Current Outpatient Medications   Medication    acyclovir (ZOVIRAX) 400 MG tablet    aspirin (ASA) 81 MG chewable tablet    atorvastatin (LIPITOR) 20 MG tablet    diphenhydrAMINE (BENADRYL) 25 MG capsule    fluticasone (FLONASE) 50 MCG/ACT nasal spray    L-Lysine HCl 500 MG CAPS    losartan (COZAAR) 50 MG tablet    sertraline (ZOLOFT) 50 MG tablet     No current facility-administered medications for this visit.        Allergies   Allergen Reactions    Lisinopril Cough    Phenylephrine Hcl Other (See Comments)     **Entex  HEADACHE (SEVERE)    Phenylpropanolamine Other (See Comments)      "**Entex  HEADACHE (SEVERE)    Pseudoephedrine Tannate Other (See Comments)     **Entex  HEADACHE (SEVERE)    Levofloxacin Rash     **Levaquin    Moxifloxacin Hcl [Moxifloxacin Hcl In Nacl] Rash       Review Of Systems:  A complete review of systems is negative except for the above mentioned items in the interval history.     Physical Exam:  /70 (BP Location: Left arm, Patient Position: Sitting, Cuff Size: Adult Regular)   Pulse 81   Temp 97.8  F (36.6  C) (Tympanic)   Ht 1.6 m (5' 3\")   Wt 75.1 kg (165 lb 9.1 oz)   SpO2 97%   BMI 29.33 kg/m    GENERAL APPEARANCE: Healthy, alert and in no acute distress.  HEENT: Eyes appear normal without scleral icterus. Extraocular movements intact.   NECK:   Supple with normal range of motion. No asymmetry or masses.  LYMPHATICS: No palpable cervical, supraclavicular nodes.  RESP: Lungs clear to auscultation bilaterally, respirations regular and easy.  CARDIOVASCULAR: Regular rate and rhythm. Normal S1, S2; no murmur, gallop, or rub.  MUSCULOSKELETAL: Extremities without gross deformities noted.   SKIN: No suspicious lesions or rashes.  NEURO: Alert and oriented x 3.  Gait steady.  PSYCHIATRIC: Mentation and affect appear normal.  Mood appropriate.      Laboratory:  Results for orders placed or performed in visit on 10/24/23   Hemoglobin A1c     Status: Abnormal   Result Value Ref Range    Estimated Average Glucose 146 mg/dL    Hemoglobin A1C 6.7 (H) <5.7 %   Lipid Profile (Chol, Trig, HDL, LDL calc)     Status: Abnormal   Result Value Ref Range    Cholesterol 201 (H) <200 mg/dL    Triglycerides 213 (H) <150 mg/dL    Direct Measure HDL 46 (L) >=50 mg/dL    LDL Cholesterol Calculated 112 (H) <=100 mg/dL    Non HDL Cholesterol 155 (H) <130 mg/dL    Narrative    Cholesterol  Desirable:  <200 mg/dL    Triglycerides  Normal:  Less than 150 mg/dL  Borderline High:  150-199 mg/dL  High:  200-499 mg/dL  Very High:  Greater than or equal to 500 mg/dL    Direct Measure " HDL  Female:  Greater than or equal to 50 mg/dL   Male:  Greater than or equal to 40 mg/dL    LDL Cholesterol  Desirable:  <100mg/dL  Above Desirable:  100-129 mg/dL   Borderline High:  130-159 mg/dL   High:  160-189 mg/dL   Very High:  >= 190 mg/dL    Non HDL Cholesterol  Desirable:  130 mg/dL  Above Desirable:  130-159 mg/dL  Borderline High:  160-189 mg/dL  High:  190-219 mg/dL  Very High:  Greater than or equal to 220 mg/dL   Albumin Random Urine Quantitative with Creat Ratio     Status: None   Result Value Ref Range    Creatinine Urine mg/dL 72.1 mg/dL    Albumin Urine mg/L <12.0 mg/L    Albumin Urine mg/g Cr     Comprehensive metabolic panel     Status: Abnormal   Result Value Ref Range    Sodium 137 135 - 145 mmol/L    Potassium 4.1 3.4 - 5.3 mmol/L    Carbon Dioxide (CO2) 22 22 - 29 mmol/L    Anion Gap 11 7 - 15 mmol/L    Urea Nitrogen 17.3 8.0 - 23.0 mg/dL    Creatinine 0.77 0.51 - 0.95 mg/dL    GFR Estimate 80 >60 mL/min/1.73m2    Calcium 9.3 8.8 - 10.2 mg/dL    Chloride 104 98 - 107 mmol/L    Glucose 121 (H) 70 - 99 mg/dL    Alkaline Phosphatase 136 (H) 35 - 104 U/L    AST 17 0 - 45 U/L    ALT 16 0 - 50 U/L    Protein Total 7.1 6.4 - 8.3 g/dL    Albumin 4.1 3.5 - 5.2 g/dL    Bilirubin Total 0.4 <=1.2 mg/dL   CBC with platelets and differential     Status: None   Result Value Ref Range    WBC Count 7.3 4.0 - 11.0 10e3/uL    RBC Count 4.39 3.80 - 5.20 10e6/uL    Hemoglobin 13.6 11.7 - 15.7 g/dL    Hematocrit 39.7 35.0 - 47.0 %    MCV 90 78 - 100 fL    MCH 31.0 26.5 - 33.0 pg    MCHC 34.3 31.5 - 36.5 g/dL    RDW 14.5 10.0 - 15.0 %    Platelet Count 211 150 - 450 10e3/uL    % Neutrophils 51 %    % Lymphocytes 32 %    % Monocytes 11 %    % Eosinophils 4 %    % Basophils 1 %    % Immature Granulocytes 1 %    NRBCs per 100 WBC 0 <1 /100    Absolute Neutrophils 3.8 1.6 - 8.3 10e3/uL    Absolute Lymphocytes 2.3 0.8 - 5.3 10e3/uL    Absolute Monocytes 0.8 0.0 - 1.3 10e3/uL    Absolute Eosinophils 0.3 0.0 - 0.7  10e3/uL    Absolute Basophils 0.1 0.0 - 0.2 10e3/uL    Absolute Immature Granulocytes 0.1 <=0.4 10e3/uL    Absolute NRBCs 0.0 10e3/uL   Protein Electrophoresis with IELP Reflex *Canceled*     Status: None ()    Narrative    The following orders were created for panel order Protein Electrophoresis with IELP Reflex.  Procedure                               Abnormality         Status                     ---------                               -----------         ------                     Protein Electrophoresis ...[[id number]]                                                 Total Protein, Serum for...[[id number]]                                                   Please view results for these tests on the individual orders.   CBC with platelets and differential     Status: None    Narrative    The following orders were created for panel order CBC with platelets and differential.  Procedure                               Abnormality         Status                     ---------                               -----------         ------                     CBC with platelets and d...[[id number]]                      Final result                 Please view results for these tests on the individual orders.       Imaging Studies:  None this visit.     ASSESSMENT/PLAN:  #1 Multiple myeloma IgG kappa multiple myeloma with 80% involvement of the bone marrow diagnosed June 2019 initially treated with Velcade and Decadron and received 4 cycles with increasing M-spike and kappa/lambda ratio.  Treatment changed to CyBorD on 10/1/2019.  M-spike plateau at 1.2 and treatment changed to monotherapy with Darzalex Faspro injection. M spike declined to 1.0 then increased again to 1.2 so Velcade and Dex added to her treatment plan with cycle 5 therapy. Following C22 (April 2022), she did initiate a treatment holiday when her Mspike was 0.3. We had followed her counts for over a year. Unfortunately, most recently, she did see a rise in her  mspike to 0.8. PET completed was negative. Dr. Walker felt this was a biochemical progression and after discussion with patient, elected to resume treatment with Darzalex faspro.     Today, she returns again to commence C3 therapy. She is doing and feeling well. I see no contraindications to proceeding with next cycle. We will now go forth with Day 1 and 15 treatment pending labs. I will plan for big myleoma labs in 3 weeks and see her back in 4 weeks with cbc and cmp prior and planned injection afterwards. All orders placed with expected dates.     #2 Lytic lesions She remains on calcium with vitamin D twice a day. Weight bearing activity. Her next dental exam is scheduled in about 3 weeks. At this point, we will hold Zometa until after that exam. After that dental exam, we can resume.           Patient in agreement with plan and verbalizes understanding. Agrees to call with any questions or concerns.    34 minutes spent in the patient's encounter today with time spent in review of patient's chart along with chart preparation and review of the treatment plan and signing of treatment plan.  Time was also spent with the patient in obtaining a review of systems and performing a physical exam along with detailed review of all test results. Time was also spent in discussing plan for future follow-up and relating instructions for follow-up and in placing future orders.    FLO Duque, MARCIA-C

## 2023-10-17 ENCOUNTER — INFUSION THERAPY VISIT (OUTPATIENT)
Dept: INFUSION THERAPY | Facility: OTHER | Age: 73
End: 2023-10-17
Attending: PHYSICIAN ASSISTANT
Payer: MEDICARE

## 2023-10-17 VITALS
BODY MASS INDEX: 30.07 KG/M2 | SYSTOLIC BLOOD PRESSURE: 108 MMHG | RESPIRATION RATE: 18 BRPM | HEART RATE: 82 BPM | TEMPERATURE: 98.7 F | WEIGHT: 169.75 LBS | DIASTOLIC BLOOD PRESSURE: 74 MMHG | OXYGEN SATURATION: 96 %

## 2023-10-17 DIAGNOSIS — C90.00 MULTIPLE MYELOMA NOT HAVING ACHIEVED REMISSION (H): Primary | ICD-10-CM

## 2023-10-17 LAB
BASO+EOS+MONOS # BLD AUTO: NORMAL 10*3/UL
BASO+EOS+MONOS NFR BLD AUTO: NORMAL %
BASOPHILS # BLD AUTO: 0 10E3/UL (ref 0–0.2)
BASOPHILS NFR BLD AUTO: 1 %
EOSINOPHIL # BLD AUTO: 0.3 10E3/UL (ref 0–0.7)
EOSINOPHIL NFR BLD AUTO: 5 %
ERYTHROCYTE [DISTWIDTH] IN BLOOD BY AUTOMATED COUNT: 14.3 % (ref 10–15)
HCT VFR BLD AUTO: 38 % (ref 35–47)
HGB BLD-MCNC: 13 G/DL (ref 11.7–15.7)
IMM GRANULOCYTES # BLD: 0 10E3/UL
IMM GRANULOCYTES NFR BLD: 1 %
LDH SERPL L TO P-CCNC: 152 U/L (ref 0–250)
LYMPHOCYTES # BLD AUTO: 1.6 10E3/UL (ref 0.8–5.3)
LYMPHOCYTES NFR BLD AUTO: 28 %
MCH RBC QN AUTO: 30.8 PG (ref 26.5–33)
MCHC RBC AUTO-ENTMCNC: 34.2 G/DL (ref 31.5–36.5)
MCV RBC AUTO: 90 FL (ref 78–100)
MONOCYTES # BLD AUTO: 0.6 10E3/UL (ref 0–1.3)
MONOCYTES NFR BLD AUTO: 11 %
NEUTROPHILS # BLD AUTO: 3.2 10E3/UL (ref 1.6–8.3)
NEUTROPHILS NFR BLD AUTO: 54 %
NRBC # BLD AUTO: 0 10E3/UL
NRBC BLD AUTO-RTO: 0 /100
PLATELET # BLD AUTO: 162 10E3/UL (ref 150–450)
RBC # BLD AUTO: 4.22 10E6/UL (ref 3.8–5.2)
TOTAL PROTEIN SERUM FOR ELP: 6.2 G/DL (ref 6.4–8.3)
WBC # BLD AUTO: 5.8 10E3/UL (ref 4–11)

## 2023-10-17 PROCEDURE — 96401 CHEMO ANTI-NEOPL SQ/IM: CPT

## 2023-10-17 PROCEDURE — 83615 LACTATE (LD) (LDH) ENZYME: CPT | Mod: ZL

## 2023-10-17 PROCEDURE — 85004 AUTOMATED DIFF WBC COUNT: CPT | Mod: ZL | Performed by: INTERNAL MEDICINE

## 2023-10-17 PROCEDURE — 82232 ASSAY OF BETA-2 PROTEIN: CPT | Mod: ZL

## 2023-10-17 PROCEDURE — 86334 IMMUNOFIX E-PHORESIS SERUM: CPT | Mod: ZL | Performed by: PATHOLOGY

## 2023-10-17 PROCEDURE — 83521 IG LIGHT CHAINS FREE EACH: CPT | Mod: ZL,91

## 2023-10-17 PROCEDURE — 250N000011 HC RX IP 250 OP 636: Mod: JZ | Performed by: INTERNAL MEDICINE

## 2023-10-17 PROCEDURE — 85810 BLOOD VISCOSITY EXAMINATION: CPT | Mod: ZL

## 2023-10-17 PROCEDURE — 84165 PROTEIN E-PHORESIS SERUM: CPT | Mod: 26 | Performed by: PATHOLOGY

## 2023-10-17 PROCEDURE — 36415 COLL VENOUS BLD VENIPUNCTURE: CPT | Mod: ZL

## 2023-10-17 PROCEDURE — 84165 PROTEIN E-PHORESIS SERUM: CPT | Mod: ZL | Performed by: PATHOLOGY

## 2023-10-17 PROCEDURE — 82784 ASSAY IGA/IGD/IGG/IGM EACH: CPT | Mod: ZL

## 2023-10-17 PROCEDURE — 84155 ASSAY OF PROTEIN SERUM: CPT | Mod: ZL

## 2023-10-17 PROCEDURE — 86334 IMMUNOFIX E-PHORESIS SERUM: CPT | Mod: 26 | Performed by: PATHOLOGY

## 2023-10-17 RX ORDER — ACETAMINOPHEN 325 MG/1
650 TABLET ORAL ONCE
Status: COMPLETED | OUTPATIENT
Start: 2023-10-17 | End: 2023-10-17

## 2023-10-17 RX ORDER — MONTELUKAST SODIUM 10 MG/1
10 TABLET ORAL ONCE
Status: COMPLETED | OUTPATIENT
Start: 2023-10-17 | End: 2023-10-17

## 2023-10-17 RX ORDER — DEXAMETHASONE 4 MG/1
12 TABLET ORAL ONCE
Status: COMPLETED | OUTPATIENT
Start: 2023-10-17 | End: 2023-10-17

## 2023-10-17 RX ORDER — DIPHENHYDRAMINE HCL 50 MG
50 CAPSULE ORAL ONCE
Status: COMPLETED | OUTPATIENT
Start: 2023-10-17 | End: 2023-10-17

## 2023-10-17 RX ADMIN — ACETAMINOPHEN 650 MG: 325 TABLET ORAL at 10:22

## 2023-10-17 RX ADMIN — DARATUMUMAB AND HYALURONIDASE-FIHJ (HUMAN RECOMBINANT) 1800 MG: 1800; 30000 INJECTION SUBCUTANEOUS at 10:47

## 2023-10-17 RX ADMIN — MONTELUKAST SODIUM 10 MG: 10 TABLET ORAL at 10:22

## 2023-10-17 RX ADMIN — DEXAMETHASONE 12 MG: 4 TABLET ORAL at 10:22

## 2023-10-17 RX ADMIN — Medication 50 MG: at 10:22

## 2023-10-17 ASSESSMENT — PAIN SCALES - GENERAL: PAINLEVEL: NO PAIN (0)

## 2023-10-17 NOTE — PROGRESS NOTES
Infusion Nursing Note:  Carmelita Watts presents today for FASPRO.    Patient seen by provider today: No   present during visit today: Not Applicable.    Note: N/A.      Intravenous Access:  Lab draw site left antecubital    Treatment Conditions:  Lab Results   Component Value Date    HGB 13.0 10/17/2023    WBC 5.8 10/17/2023    ANEU 1.0 (L) 01/03/2023    ANEUTAUTO 3.2 10/17/2023     10/17/2023        Results reviewed, labs MET treatment parameters, ok to proceed with treatment.      Post Infusion Assessment:  Patient tolerated injection without incident.  The following medication was given:     MEDICATION: FASPRO  ROUTE: SQ  SITE: RUQ - Abd.       Discharge Plan:   Patient discharged in stable condition accompanied by: self.  Departure Mode: Ambulatory.

## 2023-10-18 LAB
ALBUMIN SERPL ELPH-MCNC: 3.6 G/DL (ref 3.7–5.1)
ALPHA1 GLOB SERPL ELPH-MCNC: 0.3 G/DL (ref 0.2–0.4)
ALPHA2 GLOB SERPL ELPH-MCNC: 0.8 G/DL (ref 0.5–0.9)
B-GLOBULIN SERPL ELPH-MCNC: 0.6 G/DL (ref 0.6–1)
B2 MICROGLOB TUMOR MARKER SER-MCNC: 2.7 MG/L
GAMMA GLOB SERPL ELPH-MCNC: 0.9 G/DL (ref 0.7–1.6)
IGA SERPL-MCNC: 15 MG/DL (ref 84–499)
IGG SERPL-MCNC: 1007 MG/DL (ref 610–1616)
IGM SERPL-MCNC: 38 MG/DL (ref 35–242)
KAPPA LC FREE SER-MCNC: 0.73 MG/DL (ref 0.33–1.94)
KAPPA LC FREE/LAMBDA FREE SER NEPH: 1.04 {RATIO} (ref 0.26–1.65)
LAMBDA LC FREE SERPL-MCNC: 0.7 MG/DL (ref 0.57–2.63)
M PROTEIN SERPL ELPH-MCNC: 0.5 G/DL
PROT PATTERN SERPL ELPH-IMP: ABNORMAL
PROT PATTERN SERPL IFE-IMP: NORMAL
VISC SER: 1.6 CP (ref 1.4–1.8)

## 2023-10-23 RX ORDER — ACETAMINOPHEN 325 MG/1
650 TABLET ORAL
Status: CANCELLED | OUTPATIENT
Start: 2023-10-24

## 2023-10-23 RX ORDER — ALBUTEROL SULFATE 90 UG/1
1-2 AEROSOL, METERED RESPIRATORY (INHALATION)
Status: CANCELLED
Start: 2023-10-24

## 2023-10-23 RX ORDER — ALBUTEROL SULFATE 0.83 MG/ML
2.5 SOLUTION RESPIRATORY (INHALATION)
Status: CANCELLED | OUTPATIENT
Start: 2023-11-07

## 2023-10-23 RX ORDER — METHYLPREDNISOLONE SODIUM SUCCINATE 125 MG/2ML
125 INJECTION, POWDER, LYOPHILIZED, FOR SOLUTION INTRAMUSCULAR; INTRAVENOUS
Status: CANCELLED
Start: 2023-11-07

## 2023-10-23 RX ORDER — MONTELUKAST SODIUM 10 MG/1
10 TABLET ORAL ONCE
Status: CANCELLED | OUTPATIENT
Start: 2023-11-07

## 2023-10-23 RX ORDER — DIPHENHYDRAMINE HCL 50 MG
50 CAPSULE ORAL
Status: CANCELLED | OUTPATIENT
Start: 2023-11-07

## 2023-10-23 RX ORDER — DEXAMETHASONE 4 MG/1
12 TABLET ORAL ONCE
Status: CANCELLED | OUTPATIENT
Start: 2023-11-07

## 2023-10-23 RX ORDER — MEPERIDINE HYDROCHLORIDE 25 MG/ML
25 INJECTION INTRAMUSCULAR; INTRAVENOUS; SUBCUTANEOUS EVERY 30 MIN PRN
Status: CANCELLED | OUTPATIENT
Start: 2023-11-07

## 2023-10-23 RX ORDER — EPINEPHRINE 1 MG/ML
0.3 INJECTION, SOLUTION, CONCENTRATE INTRAVENOUS EVERY 5 MIN PRN
Status: CANCELLED | OUTPATIENT
Start: 2023-10-24

## 2023-10-23 RX ORDER — METHYLPREDNISOLONE SODIUM SUCCINATE 125 MG/2ML
125 INJECTION, POWDER, LYOPHILIZED, FOR SOLUTION INTRAMUSCULAR; INTRAVENOUS
Status: CANCELLED
Start: 2023-10-24

## 2023-10-23 RX ORDER — LORAZEPAM 2 MG/ML
0.5 INJECTION INTRAMUSCULAR EVERY 4 HOURS PRN
Status: CANCELLED | OUTPATIENT
Start: 2023-10-24

## 2023-10-23 RX ORDER — HEPARIN SODIUM,PORCINE 10 UNIT/ML
5-20 VIAL (ML) INTRAVENOUS DAILY PRN
Status: CANCELLED | OUTPATIENT
Start: 2023-11-07

## 2023-10-23 RX ORDER — DEXAMETHASONE 4 MG/1
12 TABLET ORAL
Status: CANCELLED | OUTPATIENT
Start: 2023-11-07

## 2023-10-23 RX ORDER — DEXAMETHASONE 4 MG/1
12 TABLET ORAL
Status: CANCELLED | OUTPATIENT
Start: 2023-10-24

## 2023-10-23 RX ORDER — MEPERIDINE HYDROCHLORIDE 25 MG/ML
25 INJECTION INTRAMUSCULAR; INTRAVENOUS; SUBCUTANEOUS EVERY 30 MIN PRN
Status: CANCELLED | OUTPATIENT
Start: 2023-10-24

## 2023-10-23 RX ORDER — EPINEPHRINE 1 MG/ML
0.3 INJECTION, SOLUTION, CONCENTRATE INTRAVENOUS EVERY 5 MIN PRN
Status: CANCELLED | OUTPATIENT
Start: 2023-11-07

## 2023-10-23 RX ORDER — ACETAMINOPHEN 325 MG/1
650 TABLET ORAL
Status: CANCELLED | OUTPATIENT
Start: 2023-11-07

## 2023-10-23 RX ORDER — DIPHENHYDRAMINE HYDROCHLORIDE 50 MG/ML
50 INJECTION INTRAMUSCULAR; INTRAVENOUS
Status: CANCELLED
Start: 2023-11-07

## 2023-10-23 RX ORDER — HEPARIN SODIUM (PORCINE) LOCK FLUSH IV SOLN 100 UNIT/ML 100 UNIT/ML
5 SOLUTION INTRAVENOUS
Status: CANCELLED | OUTPATIENT
Start: 2023-11-07

## 2023-10-23 RX ORDER — ALBUTEROL SULFATE 90 UG/1
1-2 AEROSOL, METERED RESPIRATORY (INHALATION)
Status: CANCELLED
Start: 2023-11-07

## 2023-10-23 RX ORDER — DIPHENHYDRAMINE HYDROCHLORIDE 50 MG/ML
50 INJECTION INTRAMUSCULAR; INTRAVENOUS
Status: CANCELLED
Start: 2023-10-24

## 2023-10-23 RX ORDER — ACETAMINOPHEN 325 MG/1
650 TABLET ORAL ONCE
Status: CANCELLED | OUTPATIENT
Start: 2023-11-07

## 2023-10-23 RX ORDER — LORAZEPAM 2 MG/ML
0.5 INJECTION INTRAMUSCULAR EVERY 4 HOURS PRN
Status: CANCELLED | OUTPATIENT
Start: 2023-11-07

## 2023-10-23 RX ORDER — HEPARIN SODIUM,PORCINE 10 UNIT/ML
5-20 VIAL (ML) INTRAVENOUS DAILY PRN
Status: CANCELLED | OUTPATIENT
Start: 2023-10-24

## 2023-10-23 RX ORDER — DIPHENHYDRAMINE HCL 50 MG
50 CAPSULE ORAL
Status: CANCELLED | OUTPATIENT
Start: 2023-10-24

## 2023-10-23 RX ORDER — DIPHENHYDRAMINE HCL 50 MG
50 CAPSULE ORAL ONCE
Status: CANCELLED | OUTPATIENT
Start: 2023-11-07

## 2023-10-23 RX ORDER — HEPARIN SODIUM (PORCINE) LOCK FLUSH IV SOLN 100 UNIT/ML 100 UNIT/ML
5 SOLUTION INTRAVENOUS
Status: CANCELLED | OUTPATIENT
Start: 2023-10-24

## 2023-10-23 RX ORDER — ALBUTEROL SULFATE 0.83 MG/ML
2.5 SOLUTION RESPIRATORY (INHALATION)
Status: CANCELLED | OUTPATIENT
Start: 2023-10-24

## 2023-10-24 ENCOUNTER — LAB (OUTPATIENT)
Dept: LAB | Facility: OTHER | Age: 73
End: 2023-10-24
Attending: NURSE PRACTITIONER
Payer: MEDICARE

## 2023-10-24 ENCOUNTER — ONCOLOGY VISIT (OUTPATIENT)
Dept: ONCOLOGY | Facility: OTHER | Age: 73
End: 2023-10-24
Attending: NURSE PRACTITIONER
Payer: MEDICARE

## 2023-10-24 ENCOUNTER — INFUSION THERAPY VISIT (OUTPATIENT)
Dept: INFUSION THERAPY | Facility: OTHER | Age: 73
End: 2023-10-24
Attending: NURSE PRACTITIONER
Payer: MEDICARE

## 2023-10-24 VITALS
TEMPERATURE: 97.8 F | HEIGHT: 63 IN | DIASTOLIC BLOOD PRESSURE: 70 MMHG | HEART RATE: 81 BPM | SYSTOLIC BLOOD PRESSURE: 132 MMHG | OXYGEN SATURATION: 97 % | WEIGHT: 165.57 LBS | BODY MASS INDEX: 29.34 KG/M2

## 2023-10-24 DIAGNOSIS — C90.00 MULTIPLE MYELOMA NOT HAVING ACHIEVED REMISSION (H): ICD-10-CM

## 2023-10-24 DIAGNOSIS — C90.00 MULTIPLE MYELOMA NOT HAVING ACHIEVED REMISSION (H): Primary | ICD-10-CM

## 2023-10-24 DIAGNOSIS — M89.9 BONE LESION: ICD-10-CM

## 2023-10-24 DIAGNOSIS — E78.2 MIXED HYPERLIPIDEMIA: ICD-10-CM

## 2023-10-24 DIAGNOSIS — E11.00 TYPE 2 DIABETES MELLITUS WITH HYPEROSMOLARITY WITHOUT COMA, WITHOUT LONG-TERM CURRENT USE OF INSULIN (H): ICD-10-CM

## 2023-10-24 LAB
ALBUMIN SERPL BCG-MCNC: 4.1 G/DL (ref 3.5–5.2)
ALP SERPL-CCNC: 136 U/L (ref 35–104)
ALT SERPL W P-5'-P-CCNC: 16 U/L (ref 0–50)
ANION GAP SERPL CALCULATED.3IONS-SCNC: 11 MMOL/L (ref 7–15)
AST SERPL W P-5'-P-CCNC: 17 U/L (ref 0–45)
BASOPHILS # BLD AUTO: 0.1 10E3/UL (ref 0–0.2)
BASOPHILS NFR BLD AUTO: 1 %
BILIRUB SERPL-MCNC: 0.4 MG/DL
BUN SERPL-MCNC: 17.3 MG/DL (ref 8–23)
CALCIUM SERPL-MCNC: 9.3 MG/DL (ref 8.8–10.2)
CHLORIDE SERPL-SCNC: 104 MMOL/L (ref 98–107)
CHOLEST SERPL-MCNC: 201 MG/DL
CREAT SERPL-MCNC: 0.77 MG/DL (ref 0.51–0.95)
CREAT UR-MCNC: 72.1 MG/DL
DEPRECATED HCO3 PLAS-SCNC: 22 MMOL/L (ref 22–29)
EGFRCR SERPLBLD CKD-EPI 2021: 80 ML/MIN/1.73M2
EOSINOPHIL # BLD AUTO: 0.3 10E3/UL (ref 0–0.7)
EOSINOPHIL NFR BLD AUTO: 4 %
ERYTHROCYTE [DISTWIDTH] IN BLOOD BY AUTOMATED COUNT: 14.5 % (ref 10–15)
EST. AVERAGE GLUCOSE BLD GHB EST-MCNC: 146 MG/DL
GLUCOSE SERPL-MCNC: 121 MG/DL (ref 70–99)
HBA1C MFR BLD: 6.7 %
HCT VFR BLD AUTO: 39.7 % (ref 35–47)
HDLC SERPL-MCNC: 46 MG/DL
HGB BLD-MCNC: 13.6 G/DL (ref 11.7–15.7)
IMM GRANULOCYTES # BLD: 0.1 10E3/UL
IMM GRANULOCYTES NFR BLD: 1 %
LDLC SERPL CALC-MCNC: 112 MG/DL
LYMPHOCYTES # BLD AUTO: 2.3 10E3/UL (ref 0.8–5.3)
LYMPHOCYTES NFR BLD AUTO: 32 %
MCH RBC QN AUTO: 31 PG (ref 26.5–33)
MCHC RBC AUTO-ENTMCNC: 34.3 G/DL (ref 31.5–36.5)
MCV RBC AUTO: 90 FL (ref 78–100)
MICROALBUMIN UR-MCNC: <12 MG/L
MICROALBUMIN/CREAT UR: NORMAL MG/G{CREAT}
MONOCYTES # BLD AUTO: 0.8 10E3/UL (ref 0–1.3)
MONOCYTES NFR BLD AUTO: 11 %
NEUTROPHILS # BLD AUTO: 3.8 10E3/UL (ref 1.6–8.3)
NEUTROPHILS NFR BLD AUTO: 51 %
NONHDLC SERPL-MCNC: 155 MG/DL
NRBC # BLD AUTO: 0 10E3/UL
NRBC BLD AUTO-RTO: 0 /100
PLATELET # BLD AUTO: 211 10E3/UL (ref 150–450)
POTASSIUM SERPL-SCNC: 4.1 MMOL/L (ref 3.4–5.3)
PROT SERPL-MCNC: 7.1 G/DL (ref 6.4–8.3)
RBC # BLD AUTO: 4.39 10E6/UL (ref 3.8–5.2)
SODIUM SERPL-SCNC: 137 MMOL/L (ref 135–145)
TRIGL SERPL-MCNC: 213 MG/DL
WBC # BLD AUTO: 7.3 10E3/UL (ref 4–11)

## 2023-10-24 PROCEDURE — 99214 OFFICE O/P EST MOD 30 MIN: CPT | Performed by: NURSE PRACTITIONER

## 2023-10-24 PROCEDURE — 80061 LIPID PANEL: CPT | Mod: ZL

## 2023-10-24 PROCEDURE — 96374 THER/PROPH/DIAG INJ IV PUSH: CPT

## 2023-10-24 PROCEDURE — 36415 COLL VENOUS BLD VENIPUNCTURE: CPT | Mod: ZL

## 2023-10-24 PROCEDURE — 83036 HEMOGLOBIN GLYCOSYLATED A1C: CPT | Mod: ZL

## 2023-10-24 PROCEDURE — 96402 CHEMO HORMON ANTINEOPL SQ/IM: CPT

## 2023-10-24 PROCEDURE — 250N000011 HC RX IP 250 OP 636: Mod: JZ | Performed by: INTERNAL MEDICINE

## 2023-10-24 PROCEDURE — G0463 HOSPITAL OUTPT CLINIC VISIT: HCPCS | Mod: 25

## 2023-10-24 PROCEDURE — 85025 COMPLETE CBC W/AUTO DIFF WBC: CPT | Mod: ZL

## 2023-10-24 PROCEDURE — 82570 ASSAY OF URINE CREATININE: CPT | Mod: ZL

## 2023-10-24 PROCEDURE — 80053 COMPREHEN METABOLIC PANEL: CPT | Mod: ZL

## 2023-10-24 RX ORDER — ACETAMINOPHEN 325 MG/1
650 TABLET ORAL ONCE
Status: COMPLETED | OUTPATIENT
Start: 2023-10-24 | End: 2023-10-24

## 2023-10-24 RX ORDER — DIPHENHYDRAMINE HCL 50 MG
50 CAPSULE ORAL ONCE
Status: COMPLETED | OUTPATIENT
Start: 2023-10-24 | End: 2023-10-24

## 2023-10-24 RX ORDER — MONTELUKAST SODIUM 10 MG/1
10 TABLET ORAL ONCE
Status: COMPLETED | OUTPATIENT
Start: 2023-10-24 | End: 2023-10-24

## 2023-10-24 RX ORDER — DEXAMETHASONE 4 MG/1
12 TABLET ORAL ONCE
Status: COMPLETED | OUTPATIENT
Start: 2023-10-24 | End: 2023-10-24

## 2023-10-24 RX ADMIN — ACETAMINOPHEN 650 MG: 325 TABLET ORAL at 10:16

## 2023-10-24 RX ADMIN — Medication 50 MG: at 10:16

## 2023-10-24 RX ADMIN — DEXAMETHASONE 12 MG: 4 TABLET ORAL at 10:17

## 2023-10-24 RX ADMIN — MONTELUKAST SODIUM 10 MG: 10 TABLET ORAL at 10:17

## 2023-10-24 RX ADMIN — DARATUMUMAB AND HYALURONIDASE-FIHJ (HUMAN RECOMBINANT) 1800 MG: 1800; 30000 INJECTION SUBCUTANEOUS at 11:47

## 2023-10-24 ASSESSMENT — PAIN SCALES - GENERAL: PAINLEVEL: NO PAIN (0)

## 2023-10-24 NOTE — PATIENT INSTRUCTIONS
Thank you for allowing me to be a part of your care today.    We will continue treatment in 2 weeks with labs prior.   In 3 weeks, you will come for labs only (myeloma labs).   I will see you back in 4 weeks with labs prior and infusion after.     If you have any questions please call [phone]    Other instructions:      Schedule will be on [employer]. We will try to get before you leave today and if not, we will mail it to you.

## 2023-10-24 NOTE — NURSING NOTE
"Oncology Rooming Note    October 24, 2023 9:09 AM   Carmelita Watts is a 73 year old female who presents for:    Chief Complaint   Patient presents with    Oncology Clinic Visit     Follow up. Multiple myeloma not having achieved remission      Initial Vitals: /70 (BP Location: Left arm, Patient Position: Sitting, Cuff Size: Adult Regular)   Pulse 81   Temp 97.8  F (36.6  C) (Tympanic)   Ht 1.6 m (5' 3\")   Wt 75.1 kg (165 lb 9.1 oz)   SpO2 97%   BMI 29.33 kg/m   Estimated body mass index is 29.33 kg/m  as calculated from the following:    Height as of this encounter: 1.6 m (5' 3\").    Weight as of this encounter: 75.1 kg (165 lb 9.1 oz). Body surface area is 1.83 meters squared.  No Pain (0) Comment: Data Unavailable   No LMP recorded. Patient is postmenopausal.  Allergies reviewed: Yes  Medications reviewed: Yes    Medications: Medication refills not needed today.  Pharmacy name entered into [facility]:    [id number] - [place] - [address] AT [facility] OF  [address]  [facility] - [place], [place] - [address] [facility] - [place] - [address]  [facility]    Clinical concerns: none       Julia Andrade LPN              "

## 2023-10-24 NOTE — NURSING NOTE
Infusion Nursing Note:  Carmelita Watts presents today for faslodex.    Patient seen by provider today: Yes: Iram Gonzalez NP   present during visit today: Not Applicable.    Note: N/A.      Intravenous Access:  No Intravenous access/labs at this visit.    Treatment Conditions:  Lab Results   Component Value Date    HGB 13.6 10/24/2023    WBC 7.3 10/24/2023    ANEU 1.0 (L) 01/03/2023    ANEUTAUTO 3.8 10/24/2023     10/24/2023        Results reviewed, labs did meet treatment parameters: .      Post Infusion Assessment:  Patient tolerated injection without incident.  Site patent and intact, free from redness, edema or discomfort.  No evidence of extravasations.  Access discontinued per protocol.       Discharge Plan:   AVS to patient via MYCHART.  Patient will return 2 weeks for next appointment.   Patient discharged in stable condition accompanied by: self.  Departure Mode: Ambulatory.      Anne Lan RN

## 2023-11-07 ENCOUNTER — INFUSION THERAPY VISIT (OUTPATIENT)
Dept: INFUSION THERAPY | Facility: OTHER | Age: 73
End: 2023-11-07
Attending: NURSE PRACTITIONER
Payer: MEDICARE

## 2023-11-07 VITALS
DIASTOLIC BLOOD PRESSURE: 76 MMHG | BODY MASS INDEX: 30.19 KG/M2 | TEMPERATURE: 98.4 F | OXYGEN SATURATION: 98 % | HEART RATE: 78 BPM | SYSTOLIC BLOOD PRESSURE: 118 MMHG | WEIGHT: 170.42 LBS

## 2023-11-07 DIAGNOSIS — C90.00 MULTIPLE MYELOMA NOT HAVING ACHIEVED REMISSION (H): Primary | ICD-10-CM

## 2023-11-07 LAB
ALBUMIN SERPL BCG-MCNC: 4 G/DL (ref 3.5–5.2)
ALP SERPL-CCNC: 144 U/L (ref 35–104)
ALT SERPL W P-5'-P-CCNC: 16 U/L (ref 0–50)
ANION GAP SERPL CALCULATED.3IONS-SCNC: 11 MMOL/L (ref 7–15)
AST SERPL W P-5'-P-CCNC: 19 U/L (ref 0–45)
BASOPHILS # BLD AUTO: 0 10E3/UL (ref 0–0.2)
BASOPHILS NFR BLD AUTO: 0 %
BILIRUB SERPL-MCNC: 0.5 MG/DL
BUN SERPL-MCNC: 15.5 MG/DL (ref 8–23)
CALCIUM SERPL-MCNC: 9.3 MG/DL (ref 8.8–10.2)
CHLORIDE SERPL-SCNC: 100 MMOL/L (ref 98–107)
CREAT SERPL-MCNC: 0.79 MG/DL (ref 0.51–0.95)
DEPRECATED HCO3 PLAS-SCNC: 24 MMOL/L (ref 22–29)
EGFRCR SERPLBLD CKD-EPI 2021: 78 ML/MIN/1.73M2
EOSINOPHIL # BLD AUTO: 0.2 10E3/UL (ref 0–0.7)
EOSINOPHIL NFR BLD AUTO: 4 %
ERYTHROCYTE [DISTWIDTH] IN BLOOD BY AUTOMATED COUNT: 14.6 % (ref 10–15)
GLUCOSE SERPL-MCNC: 123 MG/DL (ref 70–99)
HCT VFR BLD AUTO: 38.8 % (ref 35–47)
HGB BLD-MCNC: 13.1 G/DL (ref 11.7–15.7)
IMM GRANULOCYTES # BLD: 0 10E3/UL
IMM GRANULOCYTES NFR BLD: 0 %
LYMPHOCYTES # BLD AUTO: 2 10E3/UL (ref 0.8–5.3)
LYMPHOCYTES NFR BLD AUTO: 36 %
MCH RBC QN AUTO: 30.9 PG (ref 26.5–33)
MCHC RBC AUTO-ENTMCNC: 33.8 G/DL (ref 31.5–36.5)
MCV RBC AUTO: 92 FL (ref 78–100)
MONOCYTES # BLD AUTO: 0.6 10E3/UL (ref 0–1.3)
MONOCYTES NFR BLD AUTO: 11 %
NEUTROPHILS # BLD AUTO: 2.8 10E3/UL (ref 1.6–8.3)
NEUTROPHILS NFR BLD AUTO: 49 %
NRBC # BLD AUTO: 0 10E3/UL
NRBC BLD AUTO-RTO: 0 /100
PLATELET # BLD AUTO: 193 10E3/UL (ref 150–450)
POTASSIUM SERPL-SCNC: 4.3 MMOL/L (ref 3.4–5.3)
PROT SERPL-MCNC: 6.9 G/DL (ref 6.4–8.3)
RBC # BLD AUTO: 4.24 10E6/UL (ref 3.8–5.2)
SODIUM SERPL-SCNC: 135 MMOL/L (ref 135–145)
WBC # BLD AUTO: 5.7 10E3/UL (ref 4–11)

## 2023-11-07 PROCEDURE — 250N000011 HC RX IP 250 OP 636: Mod: JZ | Performed by: INTERNAL MEDICINE

## 2023-11-07 PROCEDURE — 36415 COLL VENOUS BLD VENIPUNCTURE: CPT | Mod: ZL

## 2023-11-07 PROCEDURE — 80053 COMPREHEN METABOLIC PANEL: CPT | Mod: ZL

## 2023-11-07 PROCEDURE — 96402 CHEMO HORMON ANTINEOPL SQ/IM: CPT

## 2023-11-07 PROCEDURE — 96401 CHEMO ANTI-NEOPL SQ/IM: CPT

## 2023-11-07 PROCEDURE — 85025 COMPLETE CBC W/AUTO DIFF WBC: CPT | Mod: ZL

## 2023-11-07 RX ORDER — MONTELUKAST SODIUM 10 MG/1
10 TABLET ORAL ONCE
Status: COMPLETED | OUTPATIENT
Start: 2023-11-07 | End: 2023-11-07

## 2023-11-07 RX ORDER — DIPHENHYDRAMINE HCL 50 MG
50 CAPSULE ORAL ONCE
Status: COMPLETED | OUTPATIENT
Start: 2023-11-07 | End: 2023-11-07

## 2023-11-07 RX ORDER — DEXAMETHASONE 4 MG/1
12 TABLET ORAL ONCE
Status: COMPLETED | OUTPATIENT
Start: 2023-11-07 | End: 2023-11-07

## 2023-11-07 RX ORDER — ACETAMINOPHEN 325 MG/1
650 TABLET ORAL ONCE
Status: COMPLETED | OUTPATIENT
Start: 2023-11-07 | End: 2023-11-07

## 2023-11-07 RX ADMIN — DEXAMETHASONE 12 MG: 4 TABLET ORAL at 09:29

## 2023-11-07 RX ADMIN — DARATUMUMAB AND HYALURONIDASE-FIHJ (HUMAN RECOMBINANT) 1800 MG: 1800; 30000 INJECTION SUBCUTANEOUS at 09:52

## 2023-11-07 RX ADMIN — Medication 50 MG: at 09:29

## 2023-11-07 RX ADMIN — MONTELUKAST SODIUM 10 MG: 10 TABLET ORAL at 09:29

## 2023-11-07 RX ADMIN — ACETAMINOPHEN 650 MG: 325 TABLET ORAL at 09:29

## 2023-11-07 NOTE — PROGRESS NOTES
Infusion Nursing Note:      Intravenous Access:  Lab draw site Left arm AC, Needle type butterfly, Gauge 23.  Labs drawn without difficulty.        Sarahi Esqueda LPN

## 2023-11-07 NOTE — PROGRESS NOTES
Infusion Nursing Note:  Carmelita Watts presents today for Faspro.    Patient seen by provider today: No   present during visit today: Not Applicable.    Note:   Component      Latest Ref Rng 11/7/2023  9:07 AM   WBC      4.0 - 11.0 10e3/uL 5.7    RBC Count      3.80 - 5.20 10e6/uL 4.24    Hemoglobin      11.7 - 15.7 g/dL 13.1    Hematocrit      35.0 - 47.0 % 38.8    MCV      78 - 100 fL 92    MCH      26.5 - 33.0 pg 30.9    MCHC      31.5 - 36.5 g/dL 33.8    RDW      10.0 - 15.0 % 14.6    Platelet Count      150 - 450 10e3/uL 193    % Neutrophils      % 49    % Lymphocytes      % 36    % Monocytes      % 11    % Eosinophils      % 4    % Basophils      % 0    % Immature Granulocytes      % 0    NRBCs per 100 WBC      <1 /100 0    Absolute Neutrophils      1.6 - 8.3 10e3/uL 2.8    Absolute Lymphocytes      0.8 - 5.3 10e3/uL 2.0    Absolute Monocytes      0.0 - 1.3 10e3/uL 0.6    Absolute Eosinophils      0.0 - 0.7 10e3/uL 0.2    Absolute Basophils      0.0 - 0.2 10e3/uL 0.0    Absolute Immature Granulocytes      <=0.4 10e3/uL 0.0    Absolute NRBCs      10e3/uL 0.0    Sodium      135 - 145 mmol/L 135    Potassium      3.4 - 5.3 mmol/L 4.3    Carbon Dioxide (CO2)      22 - 29 mmol/L 24    Anion Gap      7 - 15 mmol/L 11    Urea Nitrogen      8.0 - 23.0 mg/dL 15.5    Creatinine      0.51 - 0.95 mg/dL 0.79    GFR Estimate      >60 mL/min/1.73m2 78    Calcium      8.8 - 10.2 mg/dL 9.3    Chloride      98 - 107 mmol/L 100    Glucose      70 - 99 mg/dL 123 (H)    Alkaline Phosphatase      35 - 104 U/L 144 (H)    AST      0 - 45 U/L 19    ALT      0 - 50 U/L 16    Protein Total      6.4 - 8.3 g/dL 6.9    Albumin      3.5 - 5.2 g/dL 4.0    Bilirubin Total      <=1.2 mg/dL 0.5       Legend:  (H) High.      Intravenous Access:  Labs drawn without difficulty.    Treatment Conditions:  Results reviewed, labs MET treatment parameters, ok to proceed with treatment.      Post Infusion Assessment:  Patient tolerated  injection without incident into right subcutaneous abdominal tissue.       Discharge Plan:   Patient and/or family verbalized understanding of discharge instructions and all questions answered.      Emily Canales RN

## 2023-11-08 RX ORDER — ZOLEDRONIC ACID 0.04 MG/ML
4 INJECTION, SOLUTION INTRAVENOUS ONCE
Status: CANCELLED | OUTPATIENT
Start: 2023-11-08 | End: 2023-11-08

## 2023-11-13 NOTE — PATIENT INSTRUCTIONS
We will see you back as planned. If you have any questions or concerns, we can be reached Monday through Friday 8am - 430pm at [phone] ([facility] [place]). If you have concerns related to a potential reaction/side effect after hours/weekends/holiday's, please seek emergent medical care.     Patient Education     Bortezomib Solution for injection  What is this medicine?  BORTEZOMIB (bor MARYJO oh mib) is a chemotherapy drug. It slows the growth of cancer cells. This medicine is used to treat multiple myeloma, and certain lymphomas, such as mantle-cell lymphoma.  This medicine may be used for other purposes; ask your health care provider or pharmacist if you have questions.  What should I tell my health care provider before I take this medicine?  They need to know if you have any of these conditions:    diabetes    heart disease    irregular heartbeat    liver disease    on hemodialysis    low blood counts, like low white blood cells, platelets, or hemoglobin    peripheral neuropathy    taking medicine for blood pressure    an unusual or allergic reaction to bortezomib, mannitol, boron, other medicines, foods, dyes, or preservatives    pregnant or trying to get pregnant    breast-feeding  How should I use this medicine?  This medicine is for injection into a vein or for injection under the skin. It is given by a health care professional in a hospital or clinic setting.  Talk to your pediatrician regarding the use of this medicine in children. Special care may be needed.  Overdosage: If you think you have taken too much of this medicine contact a poison control center or emergency room at once.  NOTE: This medicine is only for you. Do not share this medicine with others.  What if I miss a dose?  It is important not to miss your dose. Call your doctor or health care professional if you are unable to keep an appointment.  What may interact with this medicine?  This medicine may interact with the following  medications:    ketoconazole    rifampin    ritonavir    Jason s Wort  This list may not describe all possible interactions. Give your health care provider a list of all the medicines, herbs, non-prescription drugs, or dietary supplements you use. Also tell them if you smoke, drink alcohol, or use illegal drugs. Some items may interact with your medicine.  What should I watch for while using this medicine?  Visit your doctor for checks on your progress. This drug may make you feel generally unwell. This is not uncommon, as chemotherapy can affect healthy cells as well as cancer cells. Report any side effects. Continue your course of treatment even though you feel ill unless your doctor tells you to stop.  You may get drowsy or dizzy. Do not drive, use machinery, or do anything that needs mental alertness until you know how this medicine affects you. Do not stand or sit up quickly, especially if you are an older patient. This reduces the risk of dizzy or fainting spells.  In some cases, you may be given additional medicines to help with side effects. Follow all directions for their use.  Call your doctor or health care professional for advice if you get a fever, chills or sore throat, or other symptoms of a cold or flu. Do not treat yourself. This drug decreases your body's ability to fight infections. Try to avoid being around people who are sick.  This medicine may increase your risk to bruise or bleed. Call your doctor or health care professional if you notice any unusual bleeding.  You may need blood work done while you are taking this medicine.  In some patients, this medicine may cause a serious brain infection that may cause death. If you have any problems seeing, thinking, speaking, walking, or standing, tell your doctor right away. If you cannot reach your doctor, urgently seek other source of medical care.  Do not become pregnant while taking this medicine. Women should inform their doctor if they wish  Female to become pregnant or think they might be pregnant. There is a potential for serious side effects to an unborn child. Talk to your health care professional or pharmacist for more information. Do not breast-feed an infant while taking this medicine.  Check with your doctor or health care professional if you get an attack of severe diarrhea, nausea and vomiting, or if you sweat a lot. The loss of too much body fluid can make it dangerous for you to take this medicine.  What side effects may I notice from receiving this medicine?  Side effects that you should report to your doctor or health care professional as soon as possible:    allergic reactions like skin rash, itching or hives, swelling of the face, lips, or tongue    breathing problems    changes in hearing    changes in vision    fast, irregular heartbeat    feeling faint or lightheaded, falls    pain, tingling, numbness in the hands or feet    right upper belly pain    seizures    swelling of the ankles, feet, hands    unusual bleeding or bruising    unusually weak or tired    vomiting    yellowing of the eyes or skin  Side effects that usually do not require medical attention (report to your doctor or health care professional if they continue or are bothersome):    changes in emotions or moods    constipation    diarrhea    loss of appetite    headache    irritation at site where injected    nausea  This list may not describe all possible side effects. Call your doctor for medical advice about side effects. You may report side effects to FDA at 6-223-FDA-1689.  Where should I keep my medicine?  This drug is given in a hospital or clinic and will not be stored at home.  NOTE:This sheet is a summary. It may not cover all possible information. If you have questions about this medicine, talk to your doctor, pharmacist, or health care provider. Copyright  2016 [employer]           Patient Education     Daratumumab Solution for injection  What is this  medicine?  DARATUMUMAB (joseluis a toom ue mab) is a monoclonal antibody that targets a specific protein within cancer cells and stops the cancer cells from growing. It is used to treat multiple myeloma.  This medicine may be used for other purposes; ask your health care provider or pharmacist if you have questions.  What should I tell my health care provider before I take this medicine?  They need to know if you have any of these conditions:    infection (especially a virus infection such as chickenpox, cold sores, or herpes)    lung or breathing disease    pregnant or trying to get pregnant    breast-feeding    an unusual or allergic reaction to daratumumab, other medicines, foods, dyes, or preservatives  How should I use this medicine?  This medicine is for infusion into a vein. It is given by a health care professional in a hospital or clinic setting.  Talk to your pediatrician regarding the use of this medicine in children. Special care may be needed.  Overdosage: If you think you have taken too much of this medicine contact a poison control center or emergency room at once.  NOTE: This medicine is only for you. Do not share this medicine with others.  What if I miss a dose?  Keep appointments for follow-up doses as directed. It is important not to miss your dose. Call your doctor or health care professional if you are unable to keep an appointment.  What may interact with this medicine?  Interactions have not been studied.  Give your health care provider a list of all the medicines, herbs, non-prescription drugs, or dietary supplements you use. Also tell them if you smoke, drink alcohol, or use illegal drugs. Some items may interact with your medicine.  What should I watch for while using this medicine?  This drug may make you feel generally unwell. Report any side effects. Continue your course of treatment even though you feel ill unless your doctor tells you to stop.  This medicine can cause serious allergic  reactions. To reduce your risk you may need to take medicine before treatment with this medicine. Take your medicine as directed.  This medicine can affect the results of blood tests to match your blood type. These changes can last for up to 6 months after the final dose. Your healthcare provider will do blood tests to match your blood type before you start treatment. Tell all of your healthcare providers that you are being treated with this medicine before receiving a blood transfusion.  This medicine can affect the results of some tests used to determine treatment response; extra tests may be needed to evaluate response.  Do not become pregnant while taking this medicine or for 3 months after stopping it. Women should inform their doctor if they wish to become pregnant or think they might be pregnant. There is a potential for serious side effects to an unborn child. Talk to your health care professional or pharmacist for more information.  What side effects may I notice from receiving this medicine?  Side effects that you should report to your doctor or health care professional as soon as possible:    allergic reactions like skin rash, itching or hives, swelling of the face, lips, or tongue    breathing problems    chills    cough    dizziness    feeling faint or lightheaded    headache    nausea, vomiting    shortness of breath  Side effects that usually do not require medical attention (Report these to your doctor or health care professional if they continue or are bothersome.):    back pain    fever    joint pain    loss of appetite    tiredness  This list may not describe all possible side effects. Call your doctor for medical advice about side effects. You may report side effects to FDA at 0-311-FDA-3561.  Where should I keep my medicine?  Keep out of the reach of children.  This drug is given in a hospital or clinic and will not be stored at home.  NOTE: This sheet is a summary. It may not cover all possible  information. If you have questions about this medicine, talk to your doctor, pharmacist, or health care provider.  NOTE:This sheet is a summary. It may not cover all possible information. If you have questions about this medicine, talk to your doctor, pharmacist, or health care provider. Copyright  2016 [employer]

## 2023-11-14 ENCOUNTER — LAB (OUTPATIENT)
Dept: LAB | Facility: OTHER | Age: 73
End: 2023-11-14
Payer: MEDICARE

## 2023-11-14 DIAGNOSIS — C90.00 MULTIPLE MYELOMA NOT HAVING ACHIEVED REMISSION (H): ICD-10-CM

## 2023-11-14 LAB
ALBUMIN SERPL BCG-MCNC: 4.2 G/DL (ref 3.5–5.2)
ALP SERPL-CCNC: 157 U/L (ref 35–104)
ALT SERPL W P-5'-P-CCNC: 15 U/L (ref 0–50)
ANION GAP SERPL CALCULATED.3IONS-SCNC: 11 MMOL/L (ref 7–15)
AST SERPL W P-5'-P-CCNC: 18 U/L (ref 0–45)
BASOPHILS # BLD AUTO: 0 10E3/UL (ref 0–0.2)
BASOPHILS NFR BLD AUTO: 1 %
BILIRUB SERPL-MCNC: 0.4 MG/DL
BUN SERPL-MCNC: 18.1 MG/DL (ref 8–23)
CALCIUM SERPL-MCNC: 9.1 MG/DL (ref 8.8–10.2)
CHLORIDE SERPL-SCNC: 102 MMOL/L (ref 98–107)
CREAT SERPL-MCNC: 0.76 MG/DL (ref 0.51–0.95)
DEPRECATED HCO3 PLAS-SCNC: 22 MMOL/L (ref 22–29)
EGFRCR SERPLBLD CKD-EPI 2021: 81 ML/MIN/1.73M2
EOSINOPHIL # BLD AUTO: 0.4 10E3/UL (ref 0–0.7)
EOSINOPHIL NFR BLD AUTO: 5 %
ERYTHROCYTE [DISTWIDTH] IN BLOOD BY AUTOMATED COUNT: 14.6 % (ref 10–15)
GLUCOSE SERPL-MCNC: 116 MG/DL (ref 70–99)
HCT VFR BLD AUTO: 39.6 % (ref 35–47)
HGB BLD-MCNC: 13.7 G/DL (ref 11.7–15.7)
IMM GRANULOCYTES # BLD: 0.1 10E3/UL
IMM GRANULOCYTES NFR BLD: 1 %
LDH SERPL L TO P-CCNC: 154 U/L (ref 0–250)
LYMPHOCYTES # BLD AUTO: 2.2 10E3/UL (ref 0.8–5.3)
LYMPHOCYTES NFR BLD AUTO: 32 %
MCH RBC QN AUTO: 31.4 PG (ref 26.5–33)
MCHC RBC AUTO-ENTMCNC: 34.6 G/DL (ref 31.5–36.5)
MCV RBC AUTO: 91 FL (ref 78–100)
MONOCYTES # BLD AUTO: 0.6 10E3/UL (ref 0–1.3)
MONOCYTES NFR BLD AUTO: 9 %
NEUTROPHILS # BLD AUTO: 3.5 10E3/UL (ref 1.6–8.3)
NEUTROPHILS NFR BLD AUTO: 52 %
NRBC # BLD AUTO: 0 10E3/UL
NRBC BLD AUTO-RTO: 0 /100
PLATELET # BLD AUTO: 200 10E3/UL (ref 150–450)
POTASSIUM SERPL-SCNC: 4.1 MMOL/L (ref 3.4–5.3)
PROT SERPL-MCNC: 6.9 G/DL (ref 6.4–8.3)
RBC # BLD AUTO: 4.37 10E6/UL (ref 3.8–5.2)
SODIUM SERPL-SCNC: 135 MMOL/L (ref 135–145)
WBC # BLD AUTO: 6.7 10E3/UL (ref 4–11)

## 2023-11-14 PROCEDURE — 86334 IMMUNOFIX E-PHORESIS SERUM: CPT | Mod: 26 | Performed by: PATHOLOGY

## 2023-11-14 PROCEDURE — 36415 COLL VENOUS BLD VENIPUNCTURE: CPT | Mod: ZL

## 2023-11-14 PROCEDURE — 86334 IMMUNOFIX E-PHORESIS SERUM: CPT | Mod: ZL | Performed by: STUDENT IN AN ORGANIZED HEALTH CARE EDUCATION/TRAINING PROGRAM

## 2023-11-14 PROCEDURE — 83615 LACTATE (LD) (LDH) ENZYME: CPT | Mod: ZL

## 2023-11-14 PROCEDURE — 85014 HEMATOCRIT: CPT | Mod: ZL

## 2023-11-14 PROCEDURE — 82784 ASSAY IGA/IGD/IGG/IGM EACH: CPT | Mod: ZL

## 2023-11-14 PROCEDURE — 83521 IG LIGHT CHAINS FREE EACH: CPT | Mod: ZL

## 2023-11-14 PROCEDURE — 85810 BLOOD VISCOSITY EXAMINATION: CPT | Mod: ZL

## 2023-11-14 PROCEDURE — 80053 COMPREHEN METABOLIC PANEL: CPT | Mod: ZL

## 2023-11-14 PROCEDURE — 82232 ASSAY OF BETA-2 PROTEIN: CPT | Mod: ZL

## 2023-11-15 DIAGNOSIS — C90.00 MULTIPLE MYELOMA NOT HAVING ACHIEVED REMISSION (H): Primary | ICD-10-CM

## 2023-11-15 LAB
B2 MICROGLOB TUMOR MARKER SER-MCNC: 2.6 MG/L
IGA SERPL-MCNC: 15 MG/DL (ref 84–499)
IGG SERPL-MCNC: 1091 MG/DL (ref 610–1616)
IGM SERPL-MCNC: 28 MG/DL (ref 35–242)
KAPPA LC FREE SER-MCNC: 0.57 MG/DL (ref 0.33–1.94)
KAPPA LC FREE/LAMBDA FREE SER NEPH: 2.19 {RATIO} (ref 0.26–1.65)
LAMBDA LC FREE SERPL-MCNC: 0.26 MG/DL (ref 0.57–2.63)
PROT PATTERN SERPL IFE-IMP: NORMAL
VISC SER: 1.5 CP (ref 1.4–1.8)

## 2023-11-15 NOTE — PROGRESS NOTES
Oncology Follow-up Visit    Reason for Visit:  Carmelita is a 73 year old woman with a diagnosis of multiple myeloma, who I am visiting with today for routine follow-up and consideration of ongoing therapy.     Nursing Note and documentation reviewed: Yes    Interval History:   Carlene is doing well. No concerns today. No recent or recurrent signs of infection. No fever, chills, chest pain, cough, or SOB. No bleeding concerns. Slight nausea with some anxiety but improved. No vomiting. Appetite is good. No bowel concerns. No skin concerns. Energy pretty good. Staying busy, has been working on stained glass projects for [employer]. Overall, doing well.     Did she her dentist and was noted everything looked good. Zometa has been on hold.     Oncologic History  12/31/2018 Presented to the emergency room with vertigo and fatigue.  CT scan of the head was negative and subsequent stress test was negative.  05/03/2019 PCP ordered lab work and noted a total protein of 12.9.  SPEP at that time showed an M spike of 6.2 with a large monoclonal protein seen in the gamma fraction. Urine immunofixation showed a possible small protein band in the gamma fraction  05/31/2019 Evaluated by Dr. Walker with Medical Oncology with plan to rule out myeloma and obtain bone marrow aspiration biopsy as well as a metastatic bone survey along with additional labwork  06/18/2019 Underwent bone marrow aspiration and biopsy  06/24/2019 Seen again by Dr. Walker and CBC showed a hemoglobin of 9.3, M spike 7.3 with monoclonal IgG immunoglobulin of kappa light chain type; serum viscosity was 2.9; quantitative immunoglobulins showed an IgG of 8160, beta-2 microglobulin was 5.8, BUN was 21 with creatinine is 0.8 and total protein was 13.7.  Quantitative kappa/lambda free light chains showed an elevated ratio of 17.0 bone marrow aspiration biopsy showed plasma cell myeloma with approximately 80% plasma cells.  Immunofixation showed IgG kappa and flow  cytometry revealed kappa monotypic plasma cells consistent with clonal plasma cell neoplasm and FISH panel was pending at that time. It was felt she had at least stage II disease based on her beta-2 microglobulin and anemia. Plan was to treat with Velcade 1.3 mg per/m2 days 1, 4, 8 and 11/Decadron 40 mg on days 1, 8 and 15. Initiation of Revlimid with C2 at 25 mg daily days 1 through 14.  Plan was to also obtain an MRI of the lumbar spine to rule out lytic lesion at L3.  She was initiated on Zovirax 400 mg p.o. twice daily.  06/25/2019 C1 of chemotherapy initiated  07/01/2019 Note in chart regarding patient's large co-pay for the Revlimid and no plan at this point to initiate Revlimid and treat only with Velcade and Decadron per Dr. Walker  07/11/2019 MRI lumbar spine shows a pathologic superior endplate compression fracture at L3 without evidence of retropulsed fragment and innumerable enhancing lesions throughout the lumbar spine consistent with history of multiple myeloma.  09/10/2019 Increasing M-spike and kappa lambda ratio  10/01/2019 Initiation of CyBorD  11/17/2020 Plateau of M-spike at 1.2 after 36 cycles of CyBorD  12/01/2020 Initiation of Darzalex Faspro injection  03/23/2021 M-spike increased form 1.0 to 1.2 and velcade and dexamethasone added to plan  04/12/2022 Treatment HOLIDAY commenced with Mspike 0.3 after 22 cycles of treatent  05/27/2022 Received Evusheld while on treatment holiday.   07/18/2023 Mspike 0.8. Other labs stable. PET scan ordered.   08/02/2023 PET completed showing negative study. No evidence of mass or lymphadenopathy. No concerning hypermetabolism is present.  08/14/2023 Met with Dr. Walker. Given biochemical progression, he did feel appropriate to resume therapy again with Darzalex faspro    Current Chemo Regime/TX: Darzalex faspro injection 1800mg subcutaneous per protocol  Current Cycle: 4  Completed cycles: 3  **C1/C2 Days 1, 8, 15, 22  **C3 Days 1, 15     Previous  "treatment:   Velcade 1.3 mg/m2 days 1, 4, 8 and 11 with Decadron 40 mg days 1, 8 and 15 x 4 cycles;   Velcade 1.5mg.m2/cyclophosphamide 150mg every 7 days on days 1,8,15 and 22/decadron 40mg days 1,8,15,22 ; Darzalex faspro injection 1800mg subcutaneous per protocol    Past Medical History:   Diagnosis Date    Arthritis     Cyst of breast     Depressive disorder     Diabetes mellitus, type 2 (H) 1/18/2021    Essential hypertension 10/1/2015    Major depressive disorder, recurrent episode, mild (H24) 10/1/2015    Mixed hyperlipidemia 10/1/2015    Multiple myeloma not having achieved remission (H) 6/24/2019    Other specified disorders of bladder 07/09/2012    Irritable Bladder    Seasonal allergies 10/1/2015    Unspecified essential hypertension 03/19/2007    Unspecified sinusitis (chronic) 09/05/2007       Past Surgical History:   Procedure Laterality Date    APPENDECTOMY      BONE MARROW BIOPSY, BONE SPECIMEN, NEEDLE/TROCAR N/A 6/18/2019    Procedure: BONE MARROW BIOPSY;  Surgeon: Maciej Sanz MD;  Location: [facility]    CHOLECYSTECTOMY      COLONOSCOPY  07-    repeat 10 years    COLONOSCOPY N/A 12/30/2016    Procedure: COLONOSCOPY;  Surgeon: Bhaskar Franklin DO;  Location: [facility]    PUNC/ASPIR BREAST CYST Left 1995    benign    SINUS SURGERY      TUBAL STERILIZATION         Family History   Problem Relation Age of Onset    Breast Cancer Mother 60        Cause of Death    Parkinsonism Father         \"Possible\"    Coronary Artery Disease Father     Pacemaker Father     Thyroid Disease Daughter     Breast Cancer Maternal Aunt         over 50    Diabetes No family hx of     Hypertension No family hx of     Hyperlipidemia No family hx of     Cerebrovascular Disease No family hx of     Colon Cancer No family hx of     Prostate Cancer No family hx of     Genetic Disorder No family hx of     Asthma No family hx of     Anesthesia Reaction No family hx of        Social History     Socioeconomic History    Marital " status:      Spouse name: Not on file    Number of children: Not on file    Years of education: Not on file    Highest education level: Not on file   Occupational History    Occupation: Financial     Comment:  - ([employer])   Tobacco Use    Smoking status: Never    Smokeless tobacco: Never    Tobacco comments:     Tried to Quit (YES); QUIT in 1971; Passive Exposure (NO)   Vaping Use    Vaping Use: Never used   Substance and Sexual Activity    Alcohol use: Yes     Comment: RARELY    Drug use: No    Sexual activity: Yes     Partners: Male     Birth control/protection: None   Other Topics Concern     Service Not Asked    Blood Transfusions Not Asked    Caffeine Concern Yes     Comment: Coffee >6 cups daily    Occupational Exposure Not Asked    Hobby Hazards Not Asked    Sleep Concern Not Asked    Stress Concern Not Asked    Weight Concern Not Asked    Special Diet Not Asked    Back Care Not Asked    Exercise Not Asked    Bike Helmet Not Asked    Seat Belt Not Asked    Self-Exams Not Asked    Parent/sibling w/ CABG, MI or angioplasty before 65F 55M? No   Social History Narrative    Not on file     Social Determinants of Health     Financial Resource Strain: Not on file   Food Insecurity: Not on file   Transportation Needs: Not on file   Physical Activity: Not on file   Stress: Not on file   Social Connections: Not on file   Interpersonal Safety: Not on file   Housing Stability: Not on file       Current Outpatient Medications   Medication    acyclovir (ZOVIRAX) 400 MG tablet    aspirin (ASA) 81 MG chewable tablet    atorvastatin (LIPITOR) 20 MG tablet    diphenhydrAMINE (BENADRYL) 25 MG capsule    fluticasone (FLONASE) 50 MCG/ACT nasal spray    L-Lysine HCl 500 MG CAPS    losartan (COZAAR) 50 MG tablet    sertraline (ZOLOFT) 50 MG tablet     No current facility-administered medications for this visit.        Allergies   Allergen Reactions    Lisinopril Cough    Phenylephrine Hcl Other (See  "Comments)     **Entex  HEADACHE (SEVERE)    Phenylpropanolamine Other (See Comments)     **Entex  HEADACHE (SEVERE)    Pseudoephedrine Tannate Other (See Comments)     **Entex  HEADACHE (SEVERE)    Levofloxacin Rash     **Levaquin    Moxifloxacin Hcl [Moxifloxacin Hcl In Nacl] Rash       Review Of Systems:  A complete review of systems is negative except for the above mentioned items in the interval history.     Physical Exam:  /72 (BP Location: Left arm, Patient Position: Sitting, Cuff Size: Adult Regular)   Pulse 82   Temp 97.4  F (36.3  C) (Tympanic)   Ht 1.6 m (5' 3\")   Wt 78 kg (172 lb)   SpO2 96%   BMI 30.47 kg/m    GENERAL APPEARANCE: Healthy, alert and in no acute distress.  HEENT: Eyes appear normal without scleral icterus. Extraocular movements intact.   NECK:   Supple with normal range of motion. No asymmetry or masses.  LYMPHATICS: No palpable cervical, supraclavicular nodes.  RESP: Lungs clear to auscultation bilaterally, respirations regular and easy.  CARDIOVASCULAR: Regular rate and rhythm. Normal S1, S2; no murmur, gallop, or rub.  MUSCULOSKELETAL: Extremities without gross deformities noted.   SKIN: No suspicious lesions or rashes.  NEURO: Alert and oriented x 3.  Gait steady.  PSYCHIATRIC: Mentation and affect appear normal.  Mood appropriate.      Laboratory:  Results for orders placed or performed in visit on 11/21/23   CBC with platelets and differential     Status: None   Result Value Ref Range    WBC Count 6.3 4.0 - 11.0 10e3/uL    RBC Count 4.17 3.80 - 5.20 10e6/uL    Hemoglobin 13.1 11.7 - 15.7 g/dL    Hematocrit 38.3 35.0 - 47.0 %    MCV 92 78 - 100 fL    MCH 31.4 26.5 - 33.0 pg    MCHC 34.2 31.5 - 36.5 g/dL    RDW 14.9 10.0 - 15.0 %    Platelet Count 186 150 - 450 10e3/uL    % Neutrophils 50 %    % Lymphocytes 34 %    % Monocytes 10 %    % Eosinophils 4 %    % Basophils 1 %    % Immature Granulocytes 1 %    NRBCs per 100 WBC 0 <1 /100    Absolute Neutrophils 3.3 1.6 - 8.3 " 10e3/uL    Absolute Lymphocytes 2.1 0.8 - 5.3 10e3/uL    Absolute Monocytes 0.6 0.0 - 1.3 10e3/uL    Absolute Eosinophils 0.2 0.0 - 0.7 10e3/uL    Absolute Basophils 0.0 0.0 - 0.2 10e3/uL    Absolute Immature Granulocytes 0.0 <=0.4 10e3/uL    Absolute NRBCs 0.0 10e3/uL   CBC with Platelets & Differential     Status: None    Narrative    The following orders were created for panel order CBC with Platelets & Differential.  Procedure                               Abnormality         Status                     ---------                               -----------         ------                     CBC with platelets and d...[[id number]]                      Final result                 Please view results for these tests on the individual orders.   Results for orders placed or performed in visit on 11/21/23   Protein Electrophoresis with IELP Reflex *Canceled*     Status: None ()    Narrative    The following orders were created for panel order Protein Electrophoresis with IELP Reflex.  Procedure                               Abnormality         Status                     ---------                               -----------         ------                     Protein Electrophoresis ...[[id number]]                                                 Total Protein, Serum for...[[id number]]                                                   Please view results for these tests on the individual orders.   Protein electrophoresis     Status: None (In process)    Narrative    The following orders were created for panel order Protein electrophoresis.  Procedure                               Abnormality         Status                     ---------                               -----------         ------                     Total Protein, Serum for...[[id number]]                                                 Protein Electrophoresis,...[[id number]]                      In process                   Please view results for these tests on the  individual orders.     Component      Latest Ref Rn 11/14/2023  9:53 AM   WBC      4.0 - 11.0 10e3/uL 6.7    RBC Count      3.80 - 5.20 10e6/uL 4.37    Hemoglobin      11.7 - 15.7 g/dL 13.7    Hematocrit      35.0 - 47.0 % 39.6    MCV      78 - 100 fL 91    MCH      26.5 - 33.0 pg 31.4    MCHC      31.5 - 36.5 g/dL 34.6    RDW      10.0 - 15.0 % 14.6    Platelet Count      150 - 450 10e3/uL 200    % Neutrophils      % 52    % Lymphocytes      % 32    % Monocytes      % 9    % Eosinophils      % 5    % Basophils      % 1    % Immature Granulocytes      % 1    NRBCs per 100 WBC      <1 /100 0    Absolute Neutrophils      1.6 - 8.3 10e3/uL 3.5    Absolute Lymphocytes      0.8 - 5.3 10e3/uL 2.2    Absolute Monocytes      0.0 - 1.3 10e3/uL 0.6    Absolute Eosinophils      0.0 - 0.7 10e3/uL 0.4    Absolute Basophils      0.0 - 0.2 10e3/uL 0.0    Absolute Immature Granulocytes      <=0.4 10e3/uL 0.1    Absolute NRBCs      10e3/uL 0.0    Sodium      135 - 145 mmol/L 135    Potassium      3.4 - 5.3 mmol/L 4.1    Carbon Dioxide (CO2)      22 - 29 mmol/L 22    Anion Gap      7 - 15 mmol/L 11    Urea Nitrogen      8.0 - 23.0 mg/dL 18.1    Creatinine      0.51 - 0.95 mg/dL 0.76    GFR Estimate      >60 mL/min/1.73m2 81    Calcium      8.8 - 10.2 mg/dL 9.1    Chloride      98 - 107 mmol/L 102    Glucose      70 - 99 mg/dL 116 (H)    Alkaline Phosphatase      35 - 104 U/L 157 (H)    AST      0 - 45 U/L 18    ALT      0 - 50 U/L 15    Protein Total      6.4 - 8.3 g/dL 6.9    Albumin      3.5 - 5.2 g/dL 4.2    Bilirubin Total      <=1.2 mg/dL 0.4    Kappa Free Lt Chain      0.33 - 1.94 mg/dL 0.57    Lambda Free Lt Chain      0.57 - 2.63 mg/dL 0.26 (L)    Kappa Lambda Ratio      0.26 - 1.65  2.19 (H)    IGG      610 - 1,616 mg/dL 1,091    IGA      84 - 499 mg/dL 15 (L)    IGM      35 - 242 mg/dL 28 (L)    Beta-2-Microglobulin      <2.3 mg/L 2.6 (H)    Viscosity Index      1.4 - 1.8  1.5    Lactate Dehydrogenase      0 - 250 U/L 154     Immunofixation ELP Monoclonal IgG immunoglobulin of kappa light chain type. Monoclonal antibody therapeutics (e.g. Daratumumab) may appear as monoclonal proteins on serum electrophoresis and immunofixation. Results should be interpreted with caution. Pathologic significance requires clinical correlation. Clair Lyons M.D., Ph.D.       Legend:  (H) High  (L) Low    Imaging Studies:  None this visit.     ASSESSMENT/PLAN:  #1 Multiple myeloma IgG kappa multiple myeloma with 80% involvement of the bone marrow diagnosed June 2019 initially treated with Velcade and Decadron and received 4 cycles with increasing M-spike and kappa/lambda ratio.  Treatment changed to CyBorD on 10/1/2019.  M-spike plateau at 1.2 and treatment changed to monotherapy with Darzalex Faspro injection. M spike declined to 1.0 then increased again to 1.2 so Velcade and Dex added to her treatment plan with cycle 5 therapy.     Following C22 (April 2022), she did initiate a treatment holiday when her Mspike was 0.3. We had followed her counts for over a year. Unfortunately, most recently, she did see a rise in her mspike to 0.8. PET completed was negative. Dr. Walker felt this was a biochemical progression and after discussion with patient, elected to resume treatment with Darzalex faspro.     Today, she returns feeling well and doing well. No concerns today. Mspike was just drawn today so no results but remainder of her labs look stable. She is feeling well. We will have her return in 2 weeks for Day 15 injection pending CBC. Myeloma labs in 3 weeks. I will see her back in 4 weeks prior to her next cycle, sooner with concerns.     #2 Lytic lesions She remains on calcium with vitamin D twice a day. Weight bearing activity. She had her dental exam and received clearance for bisphosphonate. We will resume Zometa (q3m) in 2 weeks pending labs.        Patient in agreement with plan and verbalizes understanding. Agrees to call with any questions or  concerns.    36 minutes spent in the patient's encounter today with time spent in review of patient's chart along with chart preparation and review of the treatment plan and signing of treatment plan.  Time was also spent with the patient in obtaining a review of systems and performing a physical exam along with detailed review of all test results. Time was also spent in discussing plan for future follow-up and relating instructions for follow-up and in placing future orders.    FLO Duque, FNP-C

## 2023-11-17 DIAGNOSIS — C90.00 MULTIPLE MYELOMA NOT HAVING ACHIEVED REMISSION (H): Primary | ICD-10-CM

## 2023-11-21 ENCOUNTER — LAB (OUTPATIENT)
Dept: LAB | Facility: OTHER | Age: 73
End: 2023-11-21
Attending: NURSE PRACTITIONER
Payer: MEDICARE

## 2023-11-21 ENCOUNTER — ONCOLOGY VISIT (OUTPATIENT)
Dept: ONCOLOGY | Facility: OTHER | Age: 73
End: 2023-11-21
Attending: NURSE PRACTITIONER
Payer: MEDICARE

## 2023-11-21 ENCOUNTER — INFUSION THERAPY VISIT (OUTPATIENT)
Dept: INFUSION THERAPY | Facility: OTHER | Age: 73
End: 2023-11-21
Attending: NURSE PRACTITIONER
Payer: MEDICARE

## 2023-11-21 VITALS
OXYGEN SATURATION: 96 % | TEMPERATURE: 97.4 F | DIASTOLIC BLOOD PRESSURE: 72 MMHG | HEART RATE: 82 BPM | BODY MASS INDEX: 30.48 KG/M2 | WEIGHT: 172 LBS | SYSTOLIC BLOOD PRESSURE: 128 MMHG | HEIGHT: 63 IN

## 2023-11-21 DIAGNOSIS — C90.00 MULTIPLE MYELOMA NOT HAVING ACHIEVED REMISSION (H): Primary | ICD-10-CM

## 2023-11-21 DIAGNOSIS — M89.9 BONE LESION: ICD-10-CM

## 2023-11-21 DIAGNOSIS — C90.00 MULTIPLE MYELOMA NOT HAVING ACHIEVED REMISSION (H): ICD-10-CM

## 2023-11-21 LAB
BASOPHILS # BLD AUTO: 0 10E3/UL (ref 0–0.2)
BASOPHILS NFR BLD AUTO: 1 %
EOSINOPHIL # BLD AUTO: 0.2 10E3/UL (ref 0–0.7)
EOSINOPHIL NFR BLD AUTO: 4 %
ERYTHROCYTE [DISTWIDTH] IN BLOOD BY AUTOMATED COUNT: 14.9 % (ref 10–15)
HCT VFR BLD AUTO: 38.3 % (ref 35–47)
HGB BLD-MCNC: 13.1 G/DL (ref 11.7–15.7)
IMM GRANULOCYTES # BLD: 0 10E3/UL
IMM GRANULOCYTES NFR BLD: 1 %
LYMPHOCYTES # BLD AUTO: 2.1 10E3/UL (ref 0.8–5.3)
LYMPHOCYTES NFR BLD AUTO: 34 %
MCH RBC QN AUTO: 31.4 PG (ref 26.5–33)
MCHC RBC AUTO-ENTMCNC: 34.2 G/DL (ref 31.5–36.5)
MCV RBC AUTO: 92 FL (ref 78–100)
MONOCYTES # BLD AUTO: 0.6 10E3/UL (ref 0–1.3)
MONOCYTES NFR BLD AUTO: 10 %
NEUTROPHILS # BLD AUTO: 3.3 10E3/UL (ref 1.6–8.3)
NEUTROPHILS NFR BLD AUTO: 50 %
NRBC # BLD AUTO: 0 10E3/UL
NRBC BLD AUTO-RTO: 0 /100
PLATELET # BLD AUTO: 186 10E3/UL (ref 150–450)
RBC # BLD AUTO: 4.17 10E6/UL (ref 3.8–5.2)
WBC # BLD AUTO: 6.3 10E3/UL (ref 4–11)

## 2023-11-21 PROCEDURE — G0463 HOSPITAL OUTPT CLINIC VISIT: HCPCS | Mod: 25

## 2023-11-21 PROCEDURE — 96401 CHEMO ANTI-NEOPL SQ/IM: CPT

## 2023-11-21 PROCEDURE — G0463 HOSPITAL OUTPT CLINIC VISIT: HCPCS

## 2023-11-21 PROCEDURE — 85004 AUTOMATED DIFF WBC COUNT: CPT | Mod: ZL | Performed by: NURSE PRACTITIONER

## 2023-11-21 PROCEDURE — 36415 COLL VENOUS BLD VENIPUNCTURE: CPT | Mod: ZL | Performed by: NURSE PRACTITIONER

## 2023-11-21 PROCEDURE — 250N000011 HC RX IP 250 OP 636: Mod: JZ | Performed by: NURSE PRACTITIONER

## 2023-11-21 PROCEDURE — 84155 ASSAY OF PROTEIN SERUM: CPT | Mod: ZL

## 2023-11-21 PROCEDURE — 99214 OFFICE O/P EST MOD 30 MIN: CPT | Performed by: NURSE PRACTITIONER

## 2023-11-21 PROCEDURE — 84165 PROTEIN E-PHORESIS SERUM: CPT | Mod: 26 | Performed by: PATHOLOGY

## 2023-11-21 PROCEDURE — 36415 COLL VENOUS BLD VENIPUNCTURE: CPT | Mod: ZL

## 2023-11-21 PROCEDURE — 84165 PROTEIN E-PHORESIS SERUM: CPT | Mod: ZL | Performed by: PATHOLOGY

## 2023-11-21 RX ORDER — ALBUTEROL SULFATE 0.83 MG/ML
2.5 SOLUTION RESPIRATORY (INHALATION)
Status: CANCELLED | OUTPATIENT
Start: 2023-12-05

## 2023-11-21 RX ORDER — DIPHENHYDRAMINE HCL 50 MG
50 CAPSULE ORAL
Status: CANCELLED | OUTPATIENT
Start: 2023-11-21

## 2023-11-21 RX ORDER — ACETAMINOPHEN 325 MG/1
650 TABLET ORAL ONCE
Status: CANCELLED | OUTPATIENT
Start: 2023-11-21

## 2023-11-21 RX ORDER — DIPHENHYDRAMINE HYDROCHLORIDE 50 MG/ML
50 INJECTION INTRAMUSCULAR; INTRAVENOUS
Status: CANCELLED
Start: 2023-12-05

## 2023-11-21 RX ORDER — MONTELUKAST SODIUM 10 MG/1
10 TABLET ORAL ONCE
Status: CANCELLED | OUTPATIENT
Start: 2023-11-21

## 2023-11-21 RX ORDER — MEPERIDINE HYDROCHLORIDE 25 MG/ML
25 INJECTION INTRAMUSCULAR; INTRAVENOUS; SUBCUTANEOUS EVERY 30 MIN PRN
Status: CANCELLED | OUTPATIENT
Start: 2023-11-21

## 2023-11-21 RX ORDER — DIPHENHYDRAMINE HYDROCHLORIDE 50 MG/ML
50 INJECTION INTRAMUSCULAR; INTRAVENOUS
Status: CANCELLED
Start: 2023-11-21

## 2023-11-21 RX ORDER — ACETAMINOPHEN 325 MG/1
650 TABLET ORAL ONCE
Status: CANCELLED | OUTPATIENT
Start: 2023-12-05

## 2023-11-21 RX ORDER — MEPERIDINE HYDROCHLORIDE 25 MG/ML
25 INJECTION INTRAMUSCULAR; INTRAVENOUS; SUBCUTANEOUS EVERY 30 MIN PRN
Status: CANCELLED | OUTPATIENT
Start: 2023-12-05

## 2023-11-21 RX ORDER — LORAZEPAM 2 MG/ML
0.5 INJECTION INTRAMUSCULAR EVERY 4 HOURS PRN
Status: CANCELLED | OUTPATIENT
Start: 2023-11-21

## 2023-11-21 RX ORDER — DEXAMETHASONE 4 MG/1
12 TABLET ORAL ONCE
Status: CANCELLED | OUTPATIENT
Start: 2023-11-21

## 2023-11-21 RX ORDER — HEPARIN SODIUM,PORCINE 10 UNIT/ML
5-20 VIAL (ML) INTRAVENOUS DAILY PRN
Status: CANCELLED | OUTPATIENT
Start: 2023-11-21

## 2023-11-21 RX ORDER — DEXAMETHASONE 4 MG/1
12 TABLET ORAL
Status: CANCELLED | OUTPATIENT
Start: 2023-12-05

## 2023-11-21 RX ORDER — ACETAMINOPHEN 325 MG/1
650 TABLET ORAL
Status: CANCELLED | OUTPATIENT
Start: 2023-11-21

## 2023-11-21 RX ORDER — EPINEPHRINE 1 MG/ML
0.3 INJECTION, SOLUTION, CONCENTRATE INTRAVENOUS EVERY 5 MIN PRN
Status: CANCELLED | OUTPATIENT
Start: 2023-12-05

## 2023-11-21 RX ORDER — ACETAMINOPHEN 325 MG/1
650 TABLET ORAL
Status: CANCELLED | OUTPATIENT
Start: 2023-12-05

## 2023-11-21 RX ORDER — DEXAMETHASONE 4 MG/1
12 TABLET ORAL
Status: CANCELLED | OUTPATIENT
Start: 2023-11-21

## 2023-11-21 RX ORDER — MONTELUKAST SODIUM 10 MG/1
10 TABLET ORAL ONCE
Status: CANCELLED | OUTPATIENT
Start: 2023-12-05

## 2023-11-21 RX ORDER — DEXAMETHASONE 4 MG/1
12 TABLET ORAL ONCE
Status: CANCELLED | OUTPATIENT
Start: 2023-12-05

## 2023-11-21 RX ORDER — HEPARIN SODIUM (PORCINE) LOCK FLUSH IV SOLN 100 UNIT/ML 100 UNIT/ML
5 SOLUTION INTRAVENOUS
Status: CANCELLED | OUTPATIENT
Start: 2023-12-05

## 2023-11-21 RX ORDER — LORAZEPAM 2 MG/ML
0.5 INJECTION INTRAMUSCULAR EVERY 4 HOURS PRN
Status: CANCELLED | OUTPATIENT
Start: 2023-12-05

## 2023-11-21 RX ORDER — EPINEPHRINE 1 MG/ML
0.3 INJECTION, SOLUTION, CONCENTRATE INTRAVENOUS EVERY 5 MIN PRN
Status: CANCELLED | OUTPATIENT
Start: 2023-11-21

## 2023-11-21 RX ORDER — DEXAMETHASONE 4 MG/1
12 TABLET ORAL ONCE
Status: COMPLETED | OUTPATIENT
Start: 2023-11-21 | End: 2023-11-21

## 2023-11-21 RX ORDER — METHYLPREDNISOLONE SODIUM SUCCINATE 125 MG/2ML
125 INJECTION, POWDER, LYOPHILIZED, FOR SOLUTION INTRAMUSCULAR; INTRAVENOUS
Status: CANCELLED
Start: 2023-11-21

## 2023-11-21 RX ORDER — DIPHENHYDRAMINE HCL 50 MG
50 CAPSULE ORAL ONCE
Status: CANCELLED | OUTPATIENT
Start: 2023-11-21

## 2023-11-21 RX ORDER — METHYLPREDNISOLONE SODIUM SUCCINATE 125 MG/2ML
125 INJECTION, POWDER, LYOPHILIZED, FOR SOLUTION INTRAMUSCULAR; INTRAVENOUS
Status: CANCELLED
Start: 2023-12-05

## 2023-11-21 RX ORDER — MONTELUKAST SODIUM 10 MG/1
10 TABLET ORAL ONCE
Status: COMPLETED | OUTPATIENT
Start: 2023-11-21 | End: 2023-11-21

## 2023-11-21 RX ORDER — HEPARIN SODIUM (PORCINE) LOCK FLUSH IV SOLN 100 UNIT/ML 100 UNIT/ML
5 SOLUTION INTRAVENOUS
Status: CANCELLED | OUTPATIENT
Start: 2023-11-21

## 2023-11-21 RX ORDER — DIPHENHYDRAMINE HCL 50 MG
50 CAPSULE ORAL ONCE
Status: COMPLETED | OUTPATIENT
Start: 2023-11-21 | End: 2023-11-21

## 2023-11-21 RX ORDER — ALBUTEROL SULFATE 90 UG/1
1-2 AEROSOL, METERED RESPIRATORY (INHALATION)
Status: CANCELLED
Start: 2023-11-21

## 2023-11-21 RX ORDER — DIPHENHYDRAMINE HCL 50 MG
50 CAPSULE ORAL ONCE
Status: CANCELLED | OUTPATIENT
Start: 2023-12-05

## 2023-11-21 RX ORDER — ALBUTEROL SULFATE 90 UG/1
1-2 AEROSOL, METERED RESPIRATORY (INHALATION)
Status: CANCELLED
Start: 2023-12-05

## 2023-11-21 RX ORDER — HEPARIN SODIUM,PORCINE 10 UNIT/ML
5-20 VIAL (ML) INTRAVENOUS DAILY PRN
Status: CANCELLED | OUTPATIENT
Start: 2023-12-05

## 2023-11-21 RX ORDER — ALBUTEROL SULFATE 0.83 MG/ML
2.5 SOLUTION RESPIRATORY (INHALATION)
Status: CANCELLED | OUTPATIENT
Start: 2023-11-21

## 2023-11-21 RX ORDER — DIPHENHYDRAMINE HCL 50 MG
50 CAPSULE ORAL
Status: CANCELLED | OUTPATIENT
Start: 2023-12-05

## 2023-11-21 RX ORDER — ACETAMINOPHEN 325 MG/1
650 TABLET ORAL ONCE
Status: COMPLETED | OUTPATIENT
Start: 2023-11-21 | End: 2023-11-21

## 2023-11-21 RX ADMIN — Medication 50 MG: at 11:55

## 2023-11-21 RX ADMIN — DEXAMETHASONE 12 MG: 4 TABLET ORAL at 11:59

## 2023-11-21 RX ADMIN — MONTELUKAST SODIUM 10 MG: 10 TABLET ORAL at 11:59

## 2023-11-21 RX ADMIN — DARATUMUMAB AND HYALURONIDASE-FIHJ (HUMAN RECOMBINANT) 1800 MG: 1800; 30000 INJECTION SUBCUTANEOUS at 12:26

## 2023-11-21 RX ADMIN — ACETAMINOPHEN 650 MG: 325 TABLET ORAL at 11:55

## 2023-11-21 ASSESSMENT — PAIN SCALES - GENERAL: PAINLEVEL: NO PAIN (0)

## 2023-11-21 NOTE — PROGRESS NOTES
Infusion Nursing Note:  Carmelita Watts presents today for Faspro.    Patient seen by provider today: Yes: Cynthia Gonzalez   present during visit today: Not Applicable.    Note: N/A.      Intravenous Access:  No Intravenous access/labs at this visit.    Treatment Conditions:  Lab Results   Component Value Date    HGB 13.1 11/21/2023    WBC 6.3 11/21/2023    ANEU 1.0 (L) 01/03/2023    ANEUTAUTO 3.3 11/21/2023     11/21/2023        Lab Results   Component Value Date     11/14/2023    POTASSIUM 4.1 11/14/2023    CR 0.76 11/14/2023    PAYAL 9.1 11/14/2023    BILITOTAL 0.4 11/14/2023    ALBUMIN 4.2 11/14/2023    ALT 15 11/14/2023    AST 18 11/14/2023       Results reviewed, labs MET treatment parameters, ok to proceed with treatment.      Post Infusion Assessment:  Patient tolerated injection without incident.       Discharge Plan:   Patient declined prescription refills.  Patient and/or family verbalized understanding of discharge instructions and all questions answered.  AVS to patient via [employer].  Patient will return 2 weeks for next appointment.   Patient discharged in stable condition accompanied by: self.  Departure Mode: Ambulatory.      Anne Lan RN

## 2023-11-21 NOTE — NURSING NOTE
"Oncology Rooming Note    November 21, 2023 11:18 AM   Carmelita Watts is a 73 year old female who presents for:    Chief Complaint   Patient presents with    Oncology Clinic Visit     Follow up. Multiple myeloma not having achieved remission      Initial Vitals: /72 (BP Location: Left arm, Patient Position: Sitting, Cuff Size: Adult Regular)   Pulse 82   Temp 97.4  F (36.3  C) (Tympanic)   Ht 1.6 m (5' 3\")   Wt 78 kg (172 lb)   SpO2 96%   BMI 30.47 kg/m   Estimated body mass index is 30.47 kg/m  as calculated from the following:    Height as of this encounter: 1.6 m (5' 3\").    Weight as of this encounter: 78 kg (172 lb). Body surface area is 1.86 meters squared.  No Pain (0) Comment: Data Unavailable   No LMP recorded. Patient is postmenopausal.  Allergies reviewed: Yes  Medications reviewed: Yes    Medications: Medication refills not needed today.  Pharmacy name entered into [facility]:    [id number] [place] [place] - [address]  [address] [facility] [address]  [facility] - [place], [place] - [address] [facility] [place] [facility] [address]  [facility]    Clinical concerns: none      Julia Andrade LPN              "

## 2023-11-21 NOTE — PATIENT INSTRUCTIONS
Thank you for allowing me to be a part of your care today.    In 2 weeks, you will be due for your Day 15 injection with labs prior (CBC, creat, Calcium). I am also going to have you resume Zometa at that point. In 3 weeks, you will be due for big myeloma labs and a CMP. In 4 weeks, you will be due for labs (CBC only), me, and infusion afterwards.     If you have any questions please call [phone]    Other instructions:      None

## 2023-11-22 ENCOUNTER — PATIENT OUTREACH (OUTPATIENT)
Dept: ONCOLOGY | Facility: OTHER | Age: 73
End: 2023-11-22

## 2023-11-22 LAB
ALBUMIN SERPL ELPH-MCNC: 4 G/DL (ref 3.7–5.1)
ALPHA1 GLOB SERPL ELPH-MCNC: 0.3 G/DL (ref 0.2–0.4)
ALPHA2 GLOB SERPL ELPH-MCNC: 0.7 G/DL (ref 0.5–0.9)
B-GLOBULIN SERPL ELPH-MCNC: 0.6 G/DL (ref 0.6–1)
GAMMA GLOB SERPL ELPH-MCNC: 0.9 G/DL (ref 0.7–1.6)
M PROTEIN SERPL ELPH-MCNC: 0.3 G/DL
PROT PATTERN SERPL ELPH-IMP: ABNORMAL
PROT SERPL-MCNC: 6.6 G/DL (ref 6.4–8.3)

## 2023-11-22 NOTE — PROGRESS NOTES
[facility]: Cancer Care                                                                                        Spoke with patient and informed her per Nida that she is responding to therapy and that her M spike is 0.3.       Signature:  Christine Christian RN

## 2023-11-27 ENCOUNTER — ANCILLARY PROCEDURE (OUTPATIENT)
Dept: GENERAL RADIOLOGY | Facility: OTHER | Age: 73
End: 2023-11-27
Attending: PHYSICIAN ASSISTANT
Payer: MEDICARE

## 2023-11-27 ENCOUNTER — OFFICE VISIT (OUTPATIENT)
Dept: FAMILY MEDICINE | Facility: OTHER | Age: 73
End: 2023-11-27
Attending: PHYSICIAN ASSISTANT
Payer: COMMERCIAL

## 2023-11-27 VITALS
BODY MASS INDEX: 29.76 KG/M2 | OXYGEN SATURATION: 97 % | WEIGHT: 168 LBS | RESPIRATION RATE: 16 BRPM | SYSTOLIC BLOOD PRESSURE: 128 MMHG | DIASTOLIC BLOOD PRESSURE: 72 MMHG | TEMPERATURE: 98 F | HEART RATE: 79 BPM

## 2023-11-27 DIAGNOSIS — E11.00 TYPE 2 DIABETES MELLITUS WITH HYPEROSMOLARITY WITHOUT COMA, WITHOUT LONG-TERM CURRENT USE OF INSULIN (H): ICD-10-CM

## 2023-11-27 DIAGNOSIS — Z78.0 ASYMPTOMATIC POSTMENOPAUSAL ESTROGEN DEFICIENCY: ICD-10-CM

## 2023-11-27 DIAGNOSIS — E55.9 VITAMIN D DEFICIENCY: ICD-10-CM

## 2023-11-27 DIAGNOSIS — R07.89 CHEST WALL PAIN: ICD-10-CM

## 2023-11-27 DIAGNOSIS — I10 ESSENTIAL HYPERTENSION WITH GOAL BLOOD PRESSURE LESS THAN 140/90: ICD-10-CM

## 2023-11-27 DIAGNOSIS — M19.90 ARTHRITIS: ICD-10-CM

## 2023-11-27 DIAGNOSIS — E78.2 MIXED HYPERLIPIDEMIA: ICD-10-CM

## 2023-11-27 DIAGNOSIS — F33.0 MAJOR DEPRESSIVE DISORDER, RECURRENT EPISODE, MILD (H): Primary | ICD-10-CM

## 2023-11-27 LAB
TSH SERPL DL<=0.005 MIU/L-ACNC: 1.92 UIU/ML (ref 0.3–4.2)
VIT D+METAB SERPL-MCNC: 29 NG/ML (ref 20–50)

## 2023-11-27 PROCEDURE — 71046 X-RAY EXAM CHEST 2 VIEWS: CPT | Mod: TC

## 2023-11-27 PROCEDURE — 36415 COLL VENOUS BLD VENIPUNCTURE: CPT | Mod: ZL | Performed by: PHYSICIAN ASSISTANT

## 2023-11-27 PROCEDURE — 99207 ZZC FOOT EXAM  NO CHARGE: CPT | Performed by: PHYSICIAN ASSISTANT

## 2023-11-27 PROCEDURE — 84443 ASSAY THYROID STIM HORMONE: CPT | Mod: ZL | Performed by: PHYSICIAN ASSISTANT

## 2023-11-27 PROCEDURE — G0463 HOSPITAL OUTPT CLINIC VISIT: HCPCS | Mod: 25

## 2023-11-27 PROCEDURE — G0463 HOSPITAL OUTPT CLINIC VISIT: HCPCS

## 2023-11-27 PROCEDURE — 82306 VITAMIN D 25 HYDROXY: CPT | Mod: ZL | Performed by: PHYSICIAN ASSISTANT

## 2023-11-27 PROCEDURE — 99214 OFFICE O/P EST MOD 30 MIN: CPT | Performed by: PHYSICIAN ASSISTANT

## 2023-11-27 ASSESSMENT — PAIN SCALES - GENERAL: PAINLEVEL: MILD PAIN (2)

## 2023-11-27 NOTE — PROGRESS NOTES
"  Assessment & Plan     Major depressive disorder, recurrent episode, mild (H24)  She is going to given refill. Doing well.     Type 2 diabetes mellitus with hyperosmolarity without coma, without long-term current use of insulin (H)  She is due for labs and foot check.   - [facility] FOOT EXAM  NO CHARGE    Essential hypertension with goal blood pressure less than 140/90  Goal Is reached. She is going to be given recheck on labs.     Mixed hyperlipidemia  Lipid level is checked and she is doing ok on her diet.     Arthritis  Scattered aches and pains.     Chest wall pain  Getting cxr. Having a fair amount of aches and pains with her body.nothing consitent but has good labs in oncology her M spike is better.   - XR CHEST 2 VW (Clinic Performed); Future    Review of external notes as documented elsewhere in note  Ordering of each unique test  Prescription drug management  10 minutes spent by me on the date of the encounter doing chart review, history and exam, documentation and further activities per the note       BMI:   Estimated body mass index is 29.76 kg/m  as calculated from the following:    Height as of 11/21/23: 1.6 m (5' 3\").    Weight as of this encounter: 76.2 kg (168 lb).   Weight management plan: Discussed healthy diet and exercise guidelines    See Patient Instructions    No follow-ups on file.    VENKATESH Trevino  [facility] - KERRI Mae is a 73 year old, presenting for the following health issues:  Follow Up        11/27/2023     8:24 AM   Additional Questions   Roomed by Michel Kamara LPN   Accompanied by self         11/27/2023     8:24 AM   Patient Reported Additional Medications   Patient reports taking the following new medications none       HPI     Diabetes Follow-up    How often are you checking your blood sugar? Not at all  What concerns do you have today about your diabetes? None   Do you have any of these symptoms? (Select all that apply)  No numbness or " tingling in feet.  No redness, sores or blisters on feet.  No complaints of excessive thirst.  No reports of blurry vision.  No significant changes to weight.    Diabetic Foot Screen:  Any complaints of increased pain or numbness ? No  Is there a foot ulcer now or a history of foot ulcer? No  Does the foot have an abnormal shape? No  Are the nails thick, too long or ingrown? No  Are there any redness or open areas? No         Sensation Testing done at all points on the diagram with monofilament     Right Foot: Sensation Normal at all points  Left Foot: Sensation Normal at all points     Risk Category: 0- No loss of protective sensation  [facility]           Hyperlipidemia Follow-Up    Are you regularly taking any medication or supplement to lower your cholesterol?   Yes- atorvastatin  Are you having muscle aches or other side effects that you think could be caused by your cholesterol lowering medication?  No    Hypertension Follow-up    Do you check your blood pressure regularly outside of the clinic? No   Are you following a low salt diet? No  Are your blood pressures ever more than 140 on the top number (systolic) OR more   than 90 on the bottom number (diastolic), for example 140/90? No    BP Readings from Last 2 Encounters:   11/27/23 128/72   11/21/23 128/72     Hemoglobin A1C (%)   Date Value   10/24/2023 6.7 (H)   07/11/2023 6.4 (H)   04/27/2021 6.4 (H)   01/26/2021 6.3 (H)     LDL Cholesterol Calculated (mg/dL)   Date Value   10/24/2023 112 (H)   07/11/2023 133 (H)   04/27/2021 137 (H)   02/14/2020 125 (H)             Review of Systems   Constitutional, HEENT, cardiovascular, pulmonary, gi and gu systems are negative, except as otherwise noted.      Objective    /72 (BP Location: Left arm, Patient Position: Sitting, Cuff Size: Adult Regular)   Pulse 79   Temp 98  F (36.7  C) (Tympanic)   Resp 16   Wt 76.2 kg (168 lb)   SpO2 97%   BMI 29.76 kg/m    Body mass index is 29.76  kg/m .  Physical Exam   GENERAL: healthy, alert and no distress  EYES: Eyes grossly normal to inspection, PERRL and conjunctivae and sclerae normal  HENT: ear canals and TM's normal, nose and mouth without ulcers or lesions  NECK: no adenopathy, no asymmetry, masses, or scars and thyroid normal to palpation  RESP: lungs clear to auscultation - no rales, rhonchi or wheezes  BREAST: normal without masses, tenderness or nipple discharge and no palpable axillary masses or adenopathy  CV: regular rate and rhythm, normal S1 S2, no S3 or S4, no murmur, click or rub, no peripheral edema and peripheral pulses strong  ABDOMEN: soft, nontender, no hepatosplenomegaly, no masses and bowel sounds normal  MS: no gross musculoskeletal defects noted, no edema  SKIN: no suspicious lesions or rashes  NEURO: Normal strength and tone, mentation intact and speech normal  PSYCH: mentation appears normal, affect normal/bright  LYMPH: no cervical, supraclavicular, axillary, or inguinal adenopathy    No results found for any visits on 11/27/23.

## 2023-11-28 NOTE — RESULT ENCOUNTER NOTE
Your Chest Xray is ok.  I will send this on to your oncology department as well. I do not think with improved labs not myeloma.    Please call the patient with this.  She has my chart but wants a phone call. Thank you.

## 2023-12-05 ENCOUNTER — INFUSION THERAPY VISIT (OUTPATIENT)
Dept: INFUSION THERAPY | Facility: OTHER | Age: 73
End: 2023-12-05
Attending: NURSE PRACTITIONER
Payer: MEDICARE

## 2023-12-05 VITALS
WEIGHT: 174.6 LBS | SYSTOLIC BLOOD PRESSURE: 134 MMHG | DIASTOLIC BLOOD PRESSURE: 82 MMHG | OXYGEN SATURATION: 93 % | HEART RATE: 84 BPM | HEIGHT: 62 IN | TEMPERATURE: 99.7 F | RESPIRATION RATE: 16 BRPM | BODY MASS INDEX: 32.13 KG/M2

## 2023-12-05 DIAGNOSIS — C90.00 MULTIPLE MYELOMA NOT HAVING ACHIEVED REMISSION (H): Primary | ICD-10-CM

## 2023-12-05 LAB
BASOPHILS # BLD AUTO: 0 10E3/UL (ref 0–0.2)
BASOPHILS NFR BLD AUTO: 0 %
CALCIUM SERPL-MCNC: 8.8 MG/DL (ref 8.8–10.2)
CREAT SERPL-MCNC: 0.69 MG/DL (ref 0.51–0.95)
EGFRCR SERPLBLD CKD-EPI 2021: 90 ML/MIN/1.73M2
EOSINOPHIL # BLD AUTO: 0.1 10E3/UL (ref 0–0.7)
EOSINOPHIL NFR BLD AUTO: 1 %
ERYTHROCYTE [DISTWIDTH] IN BLOOD BY AUTOMATED COUNT: 14.7 % (ref 10–15)
HCT VFR BLD AUTO: 40.8 % (ref 35–47)
HGB BLD-MCNC: 13.7 G/DL (ref 11.7–15.7)
IMM GRANULOCYTES # BLD: 0.1 10E3/UL
IMM GRANULOCYTES NFR BLD: 1 %
LYMPHOCYTES # BLD AUTO: 1.4 10E3/UL (ref 0.8–5.3)
LYMPHOCYTES NFR BLD AUTO: 13 %
MCH RBC QN AUTO: 31.1 PG (ref 26.5–33)
MCHC RBC AUTO-ENTMCNC: 33.6 G/DL (ref 31.5–36.5)
MCV RBC AUTO: 93 FL (ref 78–100)
MONOCYTES # BLD AUTO: 0.7 10E3/UL (ref 0–1.3)
MONOCYTES NFR BLD AUTO: 7 %
NEUTROPHILS # BLD AUTO: 8 10E3/UL (ref 1.6–8.3)
NEUTROPHILS NFR BLD AUTO: 78 %
NRBC # BLD AUTO: 0 10E3/UL
NRBC BLD AUTO-RTO: 0 /100
PLATELET # BLD AUTO: 183 10E3/UL (ref 150–450)
RBC # BLD AUTO: 4.41 10E6/UL (ref 3.8–5.2)
WBC # BLD AUTO: 10.3 10E3/UL (ref 4–11)

## 2023-12-05 PROCEDURE — 82310 ASSAY OF CALCIUM: CPT | Mod: ZL | Performed by: NURSE PRACTITIONER

## 2023-12-05 PROCEDURE — 96367 TX/PROPH/DG ADDL SEQ IV INF: CPT

## 2023-12-05 PROCEDURE — 96409 CHEMO IV PUSH SNGL DRUG: CPT

## 2023-12-05 PROCEDURE — 82565 ASSAY OF CREATININE: CPT | Mod: ZL | Performed by: NURSE PRACTITIONER

## 2023-12-05 PROCEDURE — 36415 COLL VENOUS BLD VENIPUNCTURE: CPT | Mod: ZL | Performed by: NURSE PRACTITIONER

## 2023-12-05 PROCEDURE — 250N000011 HC RX IP 250 OP 636: Mod: JZ | Performed by: NURSE PRACTITIONER

## 2023-12-05 PROCEDURE — 85025 COMPLETE CBC W/AUTO DIFF WBC: CPT | Mod: ZL | Performed by: NURSE PRACTITIONER

## 2023-12-05 RX ORDER — DIPHENHYDRAMINE HCL 50 MG
50 CAPSULE ORAL ONCE
Status: COMPLETED | OUTPATIENT
Start: 2023-12-05 | End: 2023-12-05

## 2023-12-05 RX ORDER — ACETAMINOPHEN 325 MG/1
650 TABLET ORAL ONCE
Status: COMPLETED | OUTPATIENT
Start: 2023-12-05 | End: 2023-12-05

## 2023-12-05 RX ORDER — ZOLEDRONIC ACID 0.04 MG/ML
4 INJECTION, SOLUTION INTRAVENOUS ONCE
Status: COMPLETED | OUTPATIENT
Start: 2023-12-05 | End: 2023-12-05

## 2023-12-05 RX ORDER — DEXAMETHASONE 4 MG/1
12 TABLET ORAL ONCE
Status: COMPLETED | OUTPATIENT
Start: 2023-12-05 | End: 2023-12-05

## 2023-12-05 RX ORDER — ZOLEDRONIC ACID 0.04 MG/ML
4 INJECTION, SOLUTION INTRAVENOUS ONCE
Status: CANCELLED | OUTPATIENT
Start: 2023-12-05 | End: 2023-12-05

## 2023-12-05 RX ORDER — MONTELUKAST SODIUM 10 MG/1
10 TABLET ORAL ONCE
Status: COMPLETED | OUTPATIENT
Start: 2023-12-05 | End: 2023-12-05

## 2023-12-05 RX ADMIN — MONTELUKAST SODIUM 10 MG: 10 TABLET ORAL at 10:57

## 2023-12-05 RX ADMIN — ACETAMINOPHEN 650 MG: 325 TABLET ORAL at 10:57

## 2023-12-05 RX ADMIN — ZOLEDRONIC ACID 4 MG: 0.04 INJECTION, SOLUTION INTRAVENOUS at 10:57

## 2023-12-05 RX ADMIN — DARATUMUMAB AND HYALURONIDASE-FIHJ (HUMAN RECOMBINANT) 1800 MG: 1800; 30000 INJECTION SUBCUTANEOUS at 11:17

## 2023-12-05 RX ADMIN — Medication 50 MG: at 10:57

## 2023-12-05 RX ADMIN — DEXAMETHASONE 12 MG: 4 TABLET ORAL at 10:57

## 2023-12-05 RX ADMIN — Medication 250 ML: at 10:58

## 2023-12-05 ASSESSMENT — PAIN SCALES - GENERAL: PAINLEVEL: MODERATE PAIN (5)

## 2023-12-05 NOTE — PROGRESS NOTES
Infusion Nursing Note:  Carmelitaamnuel Watts presents today for Zometa / FASPRO.    Patient seen by provider today: No   present during visit today: Not Applicable.    Note: Patient reporting sore throat. See flowsheet note.     Call to Nida Gonzalez NP ONC at 1040, updated on above. TORB with ok to treat today, but advise patient on need to follow up with PCP if fever or sx worsen or don't improve timely.       Intravenous Access:  Labs drawn without difficulty.  Peripheral IV placed.    Treatment Conditions:  Lab Results   Component Value Date    HGB 13.7 12/05/2023    WBC 10.3 12/05/2023    ANEU 1.0 (L) 01/03/2023    ANEUTAUTO 8.0 12/05/2023     12/05/2023        Lab Results   Component Value Date     11/14/2023    POTASSIUM 4.1 11/14/2023    CR 0.69 12/05/2023    PAYAL 8.8 12/05/2023    BILITOTAL 0.4 11/14/2023    ALBUMIN 4.2 11/14/2023    ALT 15 11/14/2023    AST 18 11/14/2023       Results reviewed, labs MET treatment parameters, ok to proceed with treatment.      Post Infusion Assessment:  Patient tolerated infusion without incident.  Site patent and intact, free from redness, edema or discomfort.  No evidence of extravasations.  Access discontinued per protocol.     The following medication was given:     MEDICATION: FASPRO  ROUTE: SQ  SITE: LUQ - Abd    Unable to determine where patient was last injected on chart review. Discussed last injection site with patient, she notes was done in right abd, so left was chosen today.           Discharge Plan:   Patient discharged in stable condition accompanied by: self.  Departure Mode: Ambulatory.

## 2023-12-08 ENCOUNTER — HOSPITAL ENCOUNTER (EMERGENCY)
Facility: HOSPITAL | Age: 73
Discharge: HOME | End: 2023-12-08
Attending: NURSE PRACTITIONER | Admitting: NURSE PRACTITIONER
Payer: MEDICARE

## 2023-12-08 ENCOUNTER — HOSPITAL ENCOUNTER (OUTPATIENT)
Dept: BONE DENSITY | Facility: HOSPITAL | Age: 73
Discharge: HOME | End: 2023-12-08
Attending: PHYSICIAN ASSISTANT | Admitting: PHYSICIAN ASSISTANT
Payer: MEDICARE

## 2023-12-08 VITALS
HEART RATE: 94 BPM | HEIGHT: 63 IN | OXYGEN SATURATION: 97 % | RESPIRATION RATE: 16 BRPM | DIASTOLIC BLOOD PRESSURE: 94 MMHG | BODY MASS INDEX: 30.94 KG/M2 | TEMPERATURE: 98.4 F | WEIGHT: 174.6 LBS | SYSTOLIC BLOOD PRESSURE: 144 MMHG

## 2023-12-08 DIAGNOSIS — J02.9 ACUTE PHARYNGITIS: Primary | ICD-10-CM

## 2023-12-08 DIAGNOSIS — R09.89 SYMPTOMS OF UPPER RESPIRATORY INFECTION (URI): ICD-10-CM

## 2023-12-08 DIAGNOSIS — J02.9 ACUTE PHARYNGITIS, UNSPECIFIED ETIOLOGY: ICD-10-CM

## 2023-12-08 DIAGNOSIS — Z78.0 ASYMPTOMATIC POSTMENOPAUSAL ESTROGEN DEFICIENCY: ICD-10-CM

## 2023-12-08 LAB
FLUAV RNA SPEC QL NAA+PROBE: NEGATIVE
FLUBV RNA RESP QL NAA+PROBE: NEGATIVE
GROUP A STREP BY PCR: NOT DETECTED
RSV RNA SPEC NAA+PROBE: NEGATIVE
SARS-COV-2 RNA RESP QL NAA+PROBE: NEGATIVE

## 2023-12-08 PROCEDURE — 87651 STREP A DNA AMP PROBE: CPT | Performed by: NURSE PRACTITIONER

## 2023-12-08 PROCEDURE — 87637 SARSCOV2&INF A&B&RSV AMP PRB: CPT | Performed by: NURSE PRACTITIONER

## 2023-12-08 PROCEDURE — G0463 HOSPITAL OUTPT CLINIC VISIT: HCPCS | Mod: 25

## 2023-12-08 PROCEDURE — 99213 OFFICE O/P EST LOW 20 MIN: CPT | Performed by: NURSE PRACTITIONER

## 2023-12-08 PROCEDURE — C9803 HOPD COVID-19 SPEC COLLECT: HCPCS

## 2023-12-08 PROCEDURE — 77080 DXA BONE DENSITY AXIAL: CPT

## 2023-12-08 PROCEDURE — G0463 HOSPITAL OUTPT CLINIC VISIT: HCPCS

## 2023-12-08 RX ORDER — AZITHROMYCIN 250 MG/1
TABLET, FILM COATED ORAL
Qty: 6 TABLET | Refills: 0 | Status: SHIPPED | OUTPATIENT
Start: 2023-12-08 | End: 2023-12-13

## 2023-12-08 ASSESSMENT — ENCOUNTER SYMPTOMS
COUGH: 0
APPETITE CHANGE: 0
SHORTNESS OF BREATH: 0
SORE THROAT: 1
RHINORRHEA: 1

## 2023-12-08 ASSESSMENT — ACTIVITIES OF DAILY LIVING (ADL): ADLS_ACUITY_SCORE: 35

## 2023-12-08 NOTE — ED TRIAGE NOTES
Pt presents with c/o a sore throat, swollen lymph nodes, and nasal congestion. Sx started Monday and had chemotherapy on Tuesday. Denies known exposures. No otc meds. Reports hx of multiple myeloma.

## 2023-12-08 NOTE — DISCHARGE INSTRUCTIONS
Your strep test came back negative.  Your influenza, COVID-19 and RSV tests are still pending.  We will notify you of these results when they are available.  Take the antibiotic as prescribed.    Take ibuprofen or Tylenol as needed for the pain.  Drink plenty of fluids.    Follow-up with your doctor if no improvement in symptoms.    Return to urgent care or emergency department for any worsening or concerning symptoms.

## 2023-12-08 NOTE — ED PROVIDER NOTES
History     Chief Complaint   Patient presents with    Pharyngitis     Sore throat     HPI  Carmelita Watts is a 73 year old female who presents to urgent care for evaluation of sore throat.  States that she developed a sore throat 3 days ago.  This is accompanied by rhinorrhea, swollen lymph nodes and postnasal drip.  She feels warm to the touch.  She has not checked her temperature because she does not have a thermometer at home.  Currently being treated for multiple myeloma and notes her last chemo was  3 days ago.  She has had 2 negative home COVID-19 test.  Patient tells me that she was informed that she develops a fever she should come and be tested for strep throat which prompted this visit.  She states that she does not feel sick.  Eating and drinking okay.    Allergies:  Allergies   Allergen Reactions    Lisinopril Cough    Phenylephrine Hcl Other (See Comments)     **Entex  HEADACHE (SEVERE)    Phenylpropanolamine Other (See Comments)     **Entex  HEADACHE (SEVERE)    Pseudoephedrine Tannate Other (See Comments)     **Entex  HEADACHE (SEVERE)    Levofloxacin Rash     **Levaquin    Moxifloxacin Hcl [Moxifloxacin Hcl In Nacl] Rash       Problem List:    Patient Active Problem List    Diagnosis Date Noted    Arthritis 11/27/2023     Priority: Medium    SOB (shortness of breath) 09/20/2021     Priority: Medium    Diabetes mellitus, type 2 (H) 01/18/2021     Priority: Medium    Multiple myeloma not having achieved remission (H) 06/24/2019     Priority: Medium    ACP (advance care planning) 10/26/2016     Priority: Medium     Advance Care Planning 10/26/2016: ACP Review of Chart / Resources Provided:  Reviewed chart for advance care plan.  Carmelita Watts has no plan or code status on file. Discussed available resources and provided with information. Confirmed code status reflects current choices pending further ACP discussions.  Confirmed/documented legally designated decision makers.  Added by Alison  "Leti            Major depressive disorder, recurrent episode, mild (H24) 10/01/2015     Priority: Medium    Seasonal allergies 10/01/2015     Priority: Medium    Mixed hyperlipidemia 10/01/2015     Priority: Medium    Hyperlipidemia LDL goal <130 07/05/2013     Priority: Medium    Hypertension goal BP (blood pressure) < 140/80 07/05/2013     Priority: Medium    Advanced care planning/counseling discussion 07/09/2012     Priority: Medium        Past Medical History:    Past Medical History:   Diagnosis Date    Arthritis     Cyst of breast     Depressive disorder     Diabetes mellitus, type 2 (H) 1/18/2021    Essential hypertension 10/1/2015    Major depressive disorder, recurrent episode, mild (H24) 10/1/2015    Mixed hyperlipidemia 10/1/2015    Multiple myeloma not having achieved remission (H) 6/24/2019    Other specified disorders of bladder 07/09/2012    Seasonal allergies 10/1/2015    Unspecified essential hypertension 03/19/2007    Unspecified sinusitis (chronic) 09/05/2007       Past Surgical History:    Past Surgical History:   Procedure Laterality Date    APPENDECTOMY      BONE MARROW BIOPSY, BONE SPECIMEN, NEEDLE/TROCAR N/A 6/18/2019    Procedure: BONE MARROW BIOPSY;  Surgeon: Maciej Sanz MD;  Location: [facility] OR    CHOLECYSTECTOMY      COLONOSCOPY  07-    repeat 10 years    COLONOSCOPY N/A 12/30/2016    Procedure: COLONOSCOPY;  Surgeon: Bhaskar Franklin DO;  Location: [facility] OR    PUNC/ASPIR BREAST CYST Left 1995    benign    SINUS SURGERY      TUBAL STERILIZATION         Family History:    Family History   Problem Relation Age of Onset    Breast Cancer Mother 60        Cause of Death    Parkinsonism Father         \"Possible\"    Coronary Artery Disease Father     Pacemaker Father     Thyroid Disease Daughter     Breast Cancer Maternal Aunt         over 50    Diabetes No family hx of     Hypertension No family hx of     Hyperlipidemia No family hx of     Cerebrovascular Disease No family hx of  " "   Colon Cancer No family hx of     Prostate Cancer No family hx of     Genetic Disorder No family hx of     Asthma No family hx of     Anesthesia Reaction No family hx of        Social History:  Marital Status:   [5]  Social History     Tobacco Use    Smoking status: Never    Smokeless tobacco: Never    Tobacco comments:     Tried to Quit (YES); QUIT in 1971; Passive Exposure (NO)   Vaping Use    Vaping Use: Never used   Substance Use Topics    Alcohol use: Yes     Comment: RARELY    Drug use: No        Medications:    acyclovir (ZOVIRAX) 400 MG tablet  aspirin (ASA) 81 MG chewable tablet  atorvastatin (LIPITOR) 20 MG tablet  azithromycin (ZITHROMAX) 250 MG tablet  fluticasone (FLONASE) 50 MCG/ACT nasal spray  L-Lysine HCl 500 MG CAPS  losartan (COZAAR) 50 MG tablet  sertraline (ZOLOFT) 50 MG tablet  diphenhydrAMINE (BENADRYL) 25 MG capsule          Review of Systems   Constitutional:  Negative for appetite change.   HENT:  Positive for postnasal drip, rhinorrhea and sore throat.    Respiratory:  Negative for cough and shortness of breath.    Cardiovascular:  Negative for chest pain.   All other systems reviewed and are negative.      Physical Exam   BP: 144/94  Pulse: 94  Temp: 98.4  F (36.9  C)  Resp: 16  Height: 160 cm (5' 3\")  Weight: 79.2 kg (174 lb 9.7 oz)  SpO2: 97 %      Physical Exam  Vitals and nursing note reviewed.   Constitutional:       General: She is not in acute distress.     Appearance: Normal appearance. She is well-developed. She is not ill-appearing or toxic-appearing.   HENT:      Head: Normocephalic and atraumatic.      Right Ear: Ear canal normal. No drainage or tenderness. Tympanic membrane is not perforated (Chronic), erythematous or bulging.      Left Ear: Ear canal normal. No drainage or tenderness. Tympanic membrane is perforated. Tympanic membrane is not erythematous.      Ears:      Comments: Perforated left TM which patient notes is chronic.     Mouth/Throat:      Mouth: " Mucous membranes are moist.      Pharynx: Uvula midline. No oropharyngeal exudate, posterior oropharyngeal erythema or uvula swelling.      Tonsils: No tonsillar exudate or tonsillar abscesses. 0 on the right. 0 on the left.   Eyes:      General: No scleral icterus.     Conjunctiva/sclera: Conjunctivae normal.      Pupils: Pupils are equal, round, and reactive to light.   Cardiovascular:      Rate and Rhythm: Normal rate and regular rhythm.      Heart sounds: Normal heart sounds.   Pulmonary:      Effort: Pulmonary effort is normal. No respiratory distress.      Breath sounds: Normal breath sounds.   Abdominal:      General: Abdomen is flat.   Musculoskeletal:      Cervical back: Normal range of motion and neck supple.   Lymphadenopathy:      Cervical: No cervical adenopathy.   Skin:     General: Skin is warm and dry.      Coloration: Skin is not pale.   Neurological:      Mental Status: She is alert and oriented to person, place, and time.         ED Course                 Procedures       Results for orders placed or performed during the hospital encounter of 12/08/23 (from the past 24 hour(s))   Group A Streptococcus PCR Throat Swab    Specimen: Throat; Swab   Result Value Ref Range    Group A strep by PCR Not Detected Not Detected    Narrative    The Xpert Xpress Strep A test, performed on the [employer], is a rapid, qualitative in vitro diagnostic test for the detection of Streptococcus pyogenes (Group A ß-hemolytic Streptococcus, Strep A) in throat swab specimens from patients with signs and symptoms of pharyngitis. The Xpert Xpress Strep A test can be used as an aid in the diagnosis of Group A Streptococcal pharyngitis. The assay is not intended to monitor treatment for Group A Streptococcus infections. The Xpert Xpress Strep A test utilizes an automated real-time polymerase chain reaction (PCR) to detect Streptococcus pyogenes DNA.   Symptomatic Influenza A/B, RSV, & SARS-CoV2 PCR  (COVID-19) Nasopharyngeal    Specimen: Nasopharyngeal; Swab   Result Value Ref Range    Influenza A PCR Negative Negative    Influenza B PCR Negative Negative    RSV PCR Negative Negative    SARS CoV2 PCR Negative Negative    Narrative    Testing was performed using the Xpert Xpress CoV2/Flu/RSV Assay on the [employer] Instrument. This test should be ordered for the detection of SARS-CoV-2, influenza, and RSV viruses in individuals who meet clinical and/or epidemiological criteria. Test performance is unknown in asymptomatic patients. This test is for in vitro diagnostic use under the FDA EUA for laboratories certified under CLIA to perform high or moderate complexity testing. This test has not been FDA cleared or approved. A negative result does not rule out the presence of PCR inhibitors in the specimen or target RNA in concentration below the limit of detection for the assay. If only one viral target is positive but coinfection with multiple targets is suspected, the sample should be re-tested with another FDA cleared, approved, or authorized test, if coinfection would change clinical management. This test was validated by the [facility]. These laboratories are certified under the Clinical Laboratory Improvement Amendments of 1988 (CLIA-88) as qualified to perform high complexity laboratory testing.       Medications - No data to display    Assessments & Plan (with Medical Decision Making)   73-year-old female with a sore throat x 3 days.  Patient is immunocompromise.  No obvious tonsillar abscess.  No trismus.  No tonsillar exudate.  Mild superficial cervical adenopathy.  Strep test came back negative.  Also tested negative for COVID-19, influenza and RSV.  Patient is afebrile during this visit and denies taking any antipyretics prior to this arrival.    Discussed all findings with patient.  Recommended Tylenol or ibuprofen as needed for pain.  Push fluids.  Due to patient being  immunocompromised opted to prescribe azithromycin which patient was in agreement with.  Follow-up with primary doctor if no improvement in symptoms.  Return to urgent care or emergency department for any worsening or concerning symptoms.    I have reviewed the nursing notes.    I have reviewed the findings, diagnosis, plan and need for follow up with the patient.  This document was prepared using a combination of typing and voice generated software.  While every attempt was made for accuracy, spelling and grammatical errors may exist.         New Prescriptions    AZITHROMYCIN (ZITHROMAX) 250 MG TABLET    Take 2 tablets (500 mg) by mouth daily for 1 day, THEN 1 tablet (250 mg) daily for 4 days.       Final diagnoses:   Acute pharyngitis   Symptoms of upper respiratory infection (URI)       12/8/2023   [facility]       Mpofu, Prudence, CNP  12/08/23 1036       Mpofu, Prudence, CNP  12/08/23 1037

## 2023-12-09 DIAGNOSIS — F33.0 MAJOR DEPRESSIVE DISORDER, RECURRENT EPISODE, MILD (H): ICD-10-CM

## 2023-12-09 DIAGNOSIS — C90.00 MULTIPLE MYELOMA NOT HAVING ACHIEVED REMISSION (H): ICD-10-CM

## 2023-12-11 NOTE — TELEPHONE ENCOUNTER
Sertraline (Zoloft) 50 MG tablet    Last Written Prescription Date:  05/17/2023  Last Fill Quantity: 30,   # refills: 0  Last Office Visit: 11/27/2023

## 2023-12-12 ENCOUNTER — LAB (OUTPATIENT)
Dept: LAB | Facility: OTHER | Age: 73
End: 2023-12-12
Payer: MEDICARE

## 2023-12-12 DIAGNOSIS — C90.00 MULTIPLE MYELOMA NOT HAVING ACHIEVED REMISSION (H): ICD-10-CM

## 2023-12-12 LAB
ALBUMIN SERPL BCG-MCNC: 4.3 G/DL (ref 3.5–5.2)
ALP SERPL-CCNC: 158 U/L (ref 40–150)
ALT SERPL W P-5'-P-CCNC: 14 U/L (ref 0–50)
ANION GAP SERPL CALCULATED.3IONS-SCNC: 11 MMOL/L (ref 7–15)
AST SERPL W P-5'-P-CCNC: 18 U/L (ref 0–45)
BASOPHILS # BLD AUTO: 0.1 10E3/UL (ref 0–0.2)
BASOPHILS NFR BLD AUTO: 1 %
BILIRUB SERPL-MCNC: 0.3 MG/DL
BUN SERPL-MCNC: 11.8 MG/DL (ref 8–23)
CALCIUM SERPL-MCNC: 9.4 MG/DL (ref 8.8–10.2)
CHLORIDE SERPL-SCNC: 101 MMOL/L (ref 98–107)
CREAT SERPL-MCNC: 0.72 MG/DL (ref 0.51–0.95)
DEPRECATED HCO3 PLAS-SCNC: 24 MMOL/L (ref 22–29)
EGFRCR SERPLBLD CKD-EPI 2021: 87 ML/MIN/1.73M2
EOSINOPHIL # BLD AUTO: 0.3 10E3/UL (ref 0–0.7)
EOSINOPHIL NFR BLD AUTO: 5 %
ERYTHROCYTE [DISTWIDTH] IN BLOOD BY AUTOMATED COUNT: 14.5 % (ref 10–15)
GLUCOSE SERPL-MCNC: 128 MG/DL (ref 70–99)
HCT VFR BLD AUTO: 40.2 % (ref 35–47)
HGB BLD-MCNC: 13.6 G/DL (ref 11.7–15.7)
IMM GRANULOCYTES # BLD: 0.1 10E3/UL
IMM GRANULOCYTES NFR BLD: 2 %
LYMPHOCYTES # BLD AUTO: 2.3 10E3/UL (ref 0.8–5.3)
LYMPHOCYTES NFR BLD AUTO: 34 %
MCH RBC QN AUTO: 30.8 PG (ref 26.5–33)
MCHC RBC AUTO-ENTMCNC: 33.8 G/DL (ref 31.5–36.5)
MCV RBC AUTO: 91 FL (ref 78–100)
MONOCYTES # BLD AUTO: 0.7 10E3/UL (ref 0–1.3)
MONOCYTES NFR BLD AUTO: 10 %
NEUTROPHILS # BLD AUTO: 3.2 10E3/UL (ref 1.6–8.3)
NEUTROPHILS NFR BLD AUTO: 48 %
NRBC # BLD AUTO: 0 10E3/UL
NRBC BLD AUTO-RTO: 0 /100
PLATELET # BLD AUTO: 249 10E3/UL (ref 150–450)
POTASSIUM SERPL-SCNC: 4.1 MMOL/L (ref 3.4–5.3)
PROT SERPL-MCNC: 7.1 G/DL (ref 6.4–8.3)
RBC # BLD AUTO: 4.41 10E6/UL (ref 3.8–5.2)
SODIUM SERPL-SCNC: 136 MMOL/L (ref 135–145)
WBC # BLD AUTO: 6.6 10E3/UL (ref 4–11)

## 2023-12-12 PROCEDURE — 84165 PROTEIN E-PHORESIS SERUM: CPT | Mod: ZL | Performed by: STUDENT IN AN ORGANIZED HEALTH CARE EDUCATION/TRAINING PROGRAM

## 2023-12-12 PROCEDURE — 84155 ASSAY OF PROTEIN SERUM: CPT | Mod: ZL

## 2023-12-12 PROCEDURE — 85810 BLOOD VISCOSITY EXAMINATION: CPT | Mod: ZL

## 2023-12-12 PROCEDURE — 80053 COMPREHEN METABOLIC PANEL: CPT | Mod: ZL

## 2023-12-12 PROCEDURE — 86334 IMMUNOFIX E-PHORESIS SERUM: CPT | Mod: ZL | Performed by: STUDENT IN AN ORGANIZED HEALTH CARE EDUCATION/TRAINING PROGRAM

## 2023-12-12 PROCEDURE — 83521 IG LIGHT CHAINS FREE EACH: CPT | Mod: ZL

## 2023-12-12 PROCEDURE — 85014 HEMATOCRIT: CPT | Mod: ZL

## 2023-12-12 PROCEDURE — 82784 ASSAY IGA/IGD/IGG/IGM EACH: CPT | Mod: ZL

## 2023-12-12 PROCEDURE — 84165 PROTEIN E-PHORESIS SERUM: CPT | Mod: 26 | Performed by: PATHOLOGY

## 2023-12-12 PROCEDURE — 86334 IMMUNOFIX E-PHORESIS SERUM: CPT | Mod: 26 | Performed by: PATHOLOGY

## 2023-12-12 PROCEDURE — 36415 COLL VENOUS BLD VENIPUNCTURE: CPT | Mod: ZL

## 2023-12-12 PROCEDURE — 82232 ASSAY OF BETA-2 PROTEIN: CPT | Mod: ZL

## 2023-12-13 LAB
ALBUMIN SERPL ELPH-MCNC: 4 G/DL (ref 3.7–5.1)
ALPHA1 GLOB SERPL ELPH-MCNC: 0.3 G/DL (ref 0.2–0.4)
ALPHA2 GLOB SERPL ELPH-MCNC: 0.9 G/DL (ref 0.5–0.9)
B-GLOBULIN SERPL ELPH-MCNC: 0.7 G/DL (ref 0.6–1)
B2 MICROGLOB TUMOR MARKER SER-MCNC: 2.4 MG/L
GAMMA GLOB SERPL ELPH-MCNC: 0.9 G/DL (ref 0.7–1.6)
IGA SERPL-MCNC: 17 MG/DL (ref 84–499)
IGG SERPL-MCNC: 1015 MG/DL (ref 610–1616)
IGM SERPL-MCNC: 26 MG/DL (ref 35–242)
KAPPA LC FREE SER-MCNC: 0.59 MG/DL (ref 0.33–1.94)
KAPPA LC FREE/LAMBDA FREE SER NEPH: 1.9 {RATIO} (ref 0.26–1.65)
LAMBDA LC FREE SERPL-MCNC: 0.31 MG/DL (ref 0.57–2.63)
M PROTEIN SERPL ELPH-MCNC: 0.3 G/DL
PROT PATTERN SERPL ELPH-IMP: ABNORMAL
PROT PATTERN SERPL IFE-IMP: NORMAL
TOTAL PROTEIN SERUM FOR ELP: 6.9 G/DL (ref 6.4–8.3)
VISC SER: 1.7 CP (ref 1.4–1.8)

## 2023-12-17 NOTE — PROGRESS NOTES
Oncology Follow-up Visit    Reason for Visit:  Carmelita is a 73 year old woman with a diagnosis of multiple myeloma, who I am visiting with today for routine follow-up and consideration of ongoing therapy.     Nursing Note and documentation reviewed: Yes    Interval History:   Carlene notes that she is doing well. Since last being seen, she was seen by PCP for bilateral rib pain. DEXA was normal and myeloma labs reassuring so no further diagnostics orders. Since, patient notes that these symptoms have improved. She was also seen for an acute respiratory illness in urgent care, was given zpack and symptoms resolved.     She denies other signs of infection. No fever, chills, chest pain, cough, or SOB. No bleeding concerns. No nausea or vomiting and appetite has been good, too good around the holidays. No bowel concerns. No skin concern. Energy levels are good.     Oncologic History  12/31/2018 Presented to the emergency room with vertigo and fatigue.  CT scan of the head was negative and subsequent stress test was negative.  05/03/2019 PCP ordered lab work and noted a total protein of 12.9.  SPEP at that time showed an M spike of 6.2 with a large monoclonal protein seen in the gamma fraction. Urine immunofixation showed a possible small protein band in the gamma fraction  05/31/2019 Evaluated by Dr. Walker with Medical Oncology with plan to rule out myeloma and obtain bone marrow aspiration biopsy as well as a metastatic bone survey along with additional labwork  06/18/2019 Underwent bone marrow aspiration and biopsy  06/24/2019 Seen again by Dr. Walker and CBC showed a hemoglobin of 9.3, M spike 7.3 with monoclonal IgG immunoglobulin of kappa light chain type; serum viscosity was 2.9; quantitative immunoglobulins showed an IgG of 8160, beta-2 microglobulin was 5.8, BUN was 21 with creatinine is 0.8 and total protein was 13.7.  Quantitative kappa/lambda free light chains showed an elevated ratio of 17.0 bone marrow  aspiration biopsy showed plasma cell myeloma with approximately 80% plasma cells.  Immunofixation showed IgG kappa and flow cytometry revealed kappa monotypic plasma cells consistent with clonal plasma cell neoplasm and FISH panel was pending at that time. It was felt she had at least stage II disease based on her beta-2 microglobulin and anemia. Plan was to treat with Velcade 1.3 mg per/m2 days 1, 4, 8 and 11/Decadron 40 mg on days 1, 8 and 15. Initiation of Revlimid with C2 at 25 mg daily days 1 through 14.  Plan was to also obtain an MRI of the lumbar spine to rule out lytic lesion at L3.  She was initiated on Zovirax 400 mg p.o. twice daily.  06/25/2019 C1 of chemotherapy initiated  07/01/2019 Note in chart regarding patient's large co-pay for the Revlimid and no plan at this point to initiate Revlimid and treat only with Velcade and Decadron per Dr. Walker  07/11/2019 MRI lumbar spine shows a pathologic superior endplate compression fracture at L3 without evidence of retropulsed fragment and innumerable enhancing lesions throughout the lumbar spine consistent with history of multiple myeloma.  09/10/2019 Increasing M-spike and kappa lambda ratio  10/01/2019 Initiation of CyBorD  11/17/2020 Plateau of M-spike at 1.2 after 36 cycles of CyBorD  12/01/2020 Initiation of Darzalex Faspro injection  03/23/2021 M-spike increased form 1.0 to 1.2 and velcade and dexamethasone added to plan  04/12/2022 Treatment HOLIDAY commenced with Mspike 0.3 after 22 cycles of treatent  05/27/2022 Received Evusheld while on treatment holiday.   07/18/2023 Mspike 0.8. Other labs stable. PET scan ordered.   08/02/2023 PET completed showing negative study. No evidence of mass or lymphadenopathy. No concerning hypermetabolism is present.  08/14/2023 Met with Dr. Walker. Given biochemical progression, he did feel appropriate to resume therapy again with Darzalex faspro    Current Chemo Regime/TX: Darzalex faspro injection 1800mg  "subcutaneous per protocol  Current Cycle: 5  Completed cycles: 4  **C1/C2 Days 1, 8, 15, 22  **C3/C4/C5/C6 Days 1, 15     Previous treatment:   Velcade 1.3 mg/m2 days 1, 4, 8 and 11 with Decadron 40 mg days 1, 8 and 15 x 4 cycles;   Velcade 1.5mg.m2/cyclophosphamide 150mg every 7 days on days 1,8,15 and 22/decadron 40mg days 1,8,15,22 ; Darzalex faspro injection 1800mg subcutaneous per protocol    Past Medical History:   Diagnosis Date    Arthritis     Cyst of breast     Depressive disorder     Diabetes mellitus, type 2 (H) 1/18/2021    Essential hypertension 10/1/2015    Major depressive disorder, recurrent episode, mild (H24) 10/1/2015    Mixed hyperlipidemia 10/1/2015    Multiple myeloma not having achieved remission (H) 6/24/2019    Other specified disorders of bladder 07/09/2012    Irritable Bladder    Seasonal allergies 10/1/2015    Unspecified essential hypertension 03/19/2007    Unspecified sinusitis (chronic) 09/05/2007       Past Surgical History:   Procedure Laterality Date    APPENDECTOMY      BONE MARROW BIOPSY, BONE SPECIMEN, NEEDLE/TROCAR N/A 6/18/2019    Procedure: BONE MARROW BIOPSY;  Surgeon: Maciej Sanz MD;  Location: [facility]    CHOLECYSTECTOMY      COLONOSCOPY  07-    repeat 10 years    COLONOSCOPY N/A 12/30/2016    Procedure: COLONOSCOPY;  Surgeon: Bhaskar Franklin DO;  Location: [facility] OR    PUNC/ASPIR BREAST CYST Left 1995    benign    SINUS SURGERY      TUBAL STERILIZATION         Family History   Problem Relation Age of Onset    Breast Cancer Mother 60        Cause of Death    Parkinsonism Father         \"Possible\"    Coronary Artery Disease Father     Pacemaker Father     Thyroid Disease Daughter     Breast Cancer Maternal Aunt         over 50    Diabetes No family hx of     Hypertension No family hx of     Hyperlipidemia No family hx of     Cerebrovascular Disease No family hx of     Colon Cancer No family hx of     Prostate Cancer No family hx of     Genetic Disorder No family " hx of     Asthma No family hx of     Anesthesia Reaction No family hx of        Social History     Socioeconomic History    Marital status:      Spouse name: Not on file    Number of children: Not on file    Years of education: Not on file    Highest education level: Not on file   Occupational History    Occupation: Financial     Comment:  - (FT)   Tobacco Use    Smoking status: Never    Smokeless tobacco: Never    Tobacco comments:     Tried to Quit (YES); QUIT in 1971; Passive Exposure (NO)   Vaping Use    Vaping Use: Never used   Substance and Sexual Activity    Alcohol use: Yes     Comment: RARELY    Drug use: No    Sexual activity: Yes     Partners: Male     Birth control/protection: None   Other Topics Concern     Service Not Asked    Blood Transfusions Not Asked    Caffeine Concern Yes     Comment: Coffee >6 cups daily    Occupational Exposure Not Asked    Hobby Hazards Not Asked    Sleep Concern Not Asked    Stress Concern Not Asked    Weight Concern Not Asked    Special Diet Not Asked    Back Care Not Asked    Exercise Not Asked    Bike Helmet Not Asked    Seat Belt Not Asked    Self-Exams Not Asked    Parent/sibling w/ CABG, MI or angioplasty before 65F 55M? No   Social History Narrative    Not on file     Social Determinants of Health     Financial Resource Strain: Low Risk  (11/27/2023)    Financial Resource Strain     Within the past 12 months, have you or your family members you live with been unable to get utilities (heat, electricity) when it was really needed?: No   Food Insecurity: Low Risk  (11/27/2023)    Food Insecurity     Within the past 12 months, did you worry that your food would run out before you got money to buy more?: No     Within the past 12 months, did the food you bought just not last and you didn t have money to get more?: No   Transportation Needs: Low Risk  (11/27/2023)    Transportation Needs     Within the past 12 months, has lack of transportation  "kept you from medical appointments, getting your medicines, non-medical meetings or appointments, work, or from getting things that you need?: No   Physical Activity: Not on file   Stress: Not on file   Social Connections: Not on file   Interpersonal Safety: Low Risk  (11/27/2023)    Interpersonal Safety     Do you feel physically and emotionally safe where you currently live?: Yes     Within the past 12 months, have you been hit, slapped, kicked or otherwise physically hurt by someone?: No     Within the past 12 months, have you been humiliated or emotionally abused in other ways by your partner or ex-partner?: No   Housing Stability: Low Risk  (11/27/2023)    Housing Stability     Do you have housing? : Yes     Are you worried about losing your housing?: No       Current Outpatient Medications   Medication    acyclovir (ZOVIRAX) 400 MG tablet    aspirin (ASA) 81 MG chewable tablet    atorvastatin (LIPITOR) 20 MG tablet    fluticasone (FLONASE) 50 MCG/ACT nasal spray    L-Lysine HCl 500 MG CAPS    losartan (COZAAR) 50 MG tablet    sertraline (ZOLOFT) 50 MG tablet    diphenhydrAMINE (BENADRYL) 25 MG capsule     No current facility-administered medications for this visit.        Allergies   Allergen Reactions    Lisinopril Cough    Phenylephrine Hcl Other (See Comments)     **Entex  HEADACHE (SEVERE)    Phenylpropanolamine Other (See Comments)     **Entex  HEADACHE (SEVERE)    Pseudoephedrine Tannate Other (See Comments)     **Entex  HEADACHE (SEVERE)    Levofloxacin Rash     **Levaquin    Moxifloxacin Hcl [Moxifloxacin Hcl In Nacl] Rash       Review Of Systems:  A complete review of systems is negative except for the above mentioned items in the interval history.     Physical Exam:  /76 (BP Location: Left arm, Patient Position: Sitting, Cuff Size: Adult Regular)   Pulse 82   Temp 98.2  F (36.8  C) (Tympanic)   Ht 1.6 m (5' 3\")   Wt 74.8 kg (165 lb)   SpO2 97%   BMI 29.23 kg/m    GENERAL APPEARANCE: " Healthy, alert and in no acute distress.  HEENT: Eyes appear normal without scleral icterus. Extraocular movements intact.   NECK:   Supple with normal range of motion. No asymmetry or masses.  LYMPHATICS: No palpable cervical, supraclavicular nodes.  RESP: Lungs clear to auscultation bilaterally, respirations regular and easy.  CARDIOVASCULAR: Regular rate and rhythm. Normal S1, S2; no murmur, gallop, or rub.  MUSCULOSKELETAL: Extremities without gross deformities noted.   SKIN: No suspicious lesions or rashes.  NEURO: Alert and oriented x 3.  Gait steady.  PSYCHIATRIC: Mentation and affect appear normal.  Mood appropriate.      Laboratory:  Results for orders placed or performed in visit on 12/19/23   CBC with platelets and differential     Status: None   Result Value Ref Range    WBC Count 6.3 4.0 - 11.0 10e3/uL    RBC Count 4.10 3.80 - 5.20 10e6/uL    Hemoglobin 13.0 11.7 - 15.7 g/dL    Hematocrit 37.1 35.0 - 47.0 %    MCV 91 78 - 100 fL    MCH 31.7 26.5 - 33.0 pg    MCHC 35.0 31.5 - 36.5 g/dL    RDW 14.5 10.0 - 15.0 %    Platelet Count 234 150 - 450 10e3/uL    % Neutrophils 52 %    % Lymphocytes 34 %    % Monocytes 8 %    % Eosinophils 4 %    % Basophils 1 %    % Immature Granulocytes 1 %    NRBCs per 100 WBC 0 <1 /100    Absolute Neutrophils 3.3 1.6 - 8.3 10e3/uL    Absolute Lymphocytes 2.1 0.8 - 5.3 10e3/uL    Absolute Monocytes 0.5 0.0 - 1.3 10e3/uL    Absolute Eosinophils 0.3 0.0 - 0.7 10e3/uL    Absolute Basophils 0.0 0.0 - 0.2 10e3/uL    Absolute Immature Granulocytes 0.0 <=0.4 10e3/uL    Absolute NRBCs 0.0 10e3/uL   CBC with platelets and differential     Status: None    Narrative    The following orders were created for panel order CBC with platelets and differential.  Procedure                               Abnormality         Status                     ---------                               -----------         ------                     CBC with platelets and d...[[id number]]                       Final result                 Please view results for these tests on the individual orders.     Component      Latest Ref Rng 12/12/2023  9:49 AM   WBC      4.0 - 11.0 10e3/uL 6.6    RBC Count      3.80 - 5.20 10e6/uL 4.41    Hemoglobin      11.7 - 15.7 g/dL 13.6    Hematocrit      35.0 - 47.0 % 40.2    MCV      78 - 100 fL 91    MCH      26.5 - 33.0 pg 30.8    MCHC      31.5 - 36.5 g/dL 33.8    RDW      10.0 - 15.0 % 14.5    Platelet Count      150 - 450 10e3/uL 249    % Neutrophils      % 48    % Lymphocytes      % 34    % Monocytes      % 10    % Eosinophils      % 5    % Basophils      % 1    % Immature Granulocytes      % 2    NRBCs per 100 WBC      <1 /100 0    Absolute Neutrophils      1.6 - 8.3 10e3/uL 3.2    Absolute Lymphocytes      0.8 - 5.3 10e3/uL 2.3    Absolute Monocytes      0.0 - 1.3 10e3/uL 0.7    Absolute Eosinophils      0.0 - 0.7 10e3/uL 0.3    Absolute Basophils      0.0 - 0.2 10e3/uL 0.1    Absolute Immature Granulocytes      <=0.4 10e3/uL 0.1    Absolute NRBCs      10e3/uL 0.0    Sodium      135 - 145 mmol/L 136    Potassium      3.4 - 5.3 mmol/L 4.1    Carbon Dioxide (CO2)      22 - 29 mmol/L 24    Anion Gap      7 - 15 mmol/L 11    Urea Nitrogen      8.0 - 23.0 mg/dL 11.8    Creatinine      0.51 - 0.95 mg/dL 0.72    GFR Estimate      >60 mL/min/1.73m2 87    Calcium      8.8 - 10.2 mg/dL 9.4    Chloride      98 - 107 mmol/L 101    Glucose      70 - 99 mg/dL 128 (H)    Alkaline Phosphatase      40 - 150 U/L 158 (H)    AST      0 - 45 U/L 18    ALT      0 - 50 U/L 14    Protein Total      6.4 - 8.3 g/dL 7.1    Albumin      3.5 - 5.2 g/dL 4.3    Bilirubin Total      <=1.2 mg/dL 0.3    Albumin Fraction      3.7 - 5.1 g/dL 4.0    Alpha 1 Fraction      0.2 - 0.4 g/dL 0.3    Alpha 2 Fraction      0.5 - 0.9 g/dL 0.9    Beta Fraction      0.6 - 1.0 g/dL 0.7    Gamma Fraction      0.7 - 1.6 g/dL 0.9    Monoclonal Peak      <=0.0 g/dL 0.3 (H)    ELP Interpretation: Monoclonal protein (0.3 g/dL) seen in  the gamma fraction. See immunofixation report on same specimen. Pathologic significance requires clinical correlation. Clair Lyons M.D., Ph.D.    IGG      610 - 1,616 mg/dL 1,015    IGA      84 - 499 mg/dL 17 (L)    IGM      35 - 242 mg/dL 26 (L)    Blende Free Lt Chain      0.33 - 1.94 mg/dL 0.59    Lambda Free Lt Chain      0.57 - 2.63 mg/dL 0.31 (L)    Kappa Lambda Ratio      0.26 - 1.65  1.90 (H)    Beta-2-Microglobulin      <2.3 mg/L 2.4 (H)    Immunofixation ELP Monoclonal IgG immunoglobulin of kappa light chain type. Monoclonal antibody therapeutics (e.g. Daratumumab) may appear as monoclonal proteins on serum electrophoresis and immunofixation. Results should be interpreted with caution. Pathologic significance requires clinical correlation. Clair Lyons M.D., Ph.D.    Viscosity Index      1.4 - 1.8  1.7    Total Protein Serum for ELP      6.4 - 8.3 g/dL 6.9       Legend:  (H) High  (L) Low    Imaging Studies:  None this visit.     ASSESSMENT/PLAN:  #1 Multiple myeloma IgG kappa multiple myeloma with 80% involvement of the bone marrow diagnosed June 2019 initially treated with Velcade and Decadron and received 4 cycles with increasing M-spike and kappa/lambda ratio.  Treatment changed to CyBorD on 10/1/2019.  M-spike plateau at 1.2 and treatment changed to monotherapy with Darzalex Faspro injection. M spike declined to 1.0 then increased again to 1.2 so Velcade and Dex added to her treatment plan with cycle 5 therapy.     Following C22 (April 2022), she did initiate a treatment holiday when her Mspike was 0.3. We had followed her counts for over a year. Unfortunately, most recently, she did see a rise in her mspike to 0.8. PET completed was negative. Dr. Walker felt this was a biochemical progression and after discussion with patient, elected to resume treatment with Darzalex faspro.     Today, she is doing well. Her mpike is stable and light chain ratio improved. No evidence of organ damage. We will  continue with this next cycle, I will see her in 4 weeks, sooner with concerns.     #2 Lytic lesions She remains on calcium with vitamin D twice a day. Weight bearing activity. She had her dental exam and received clearance for bisphosphonate. Resumed 12/05/2023. Will be due in March.       Patient in agreement with plan and verbalizes understanding. Agrees to call with any questions or concerns.      35 minutes spent in the patient's encounter today with time spent in review of patient's chart along with chart preparation and review of the treatment plan and signing of treatment plan.  Time was also spent with the patient in obtaining a review of systems and performing a physical exam along with detailed review of all test results. Time was also spent in discussing plan for future follow-up and relating instructions for follow-up and in placing future orders.      FLO Duque, FNP-C

## 2023-12-19 ENCOUNTER — INFUSION THERAPY VISIT (OUTPATIENT)
Dept: INFUSION THERAPY | Facility: OTHER | Age: 73
End: 2023-12-19
Attending: INTERNAL MEDICINE
Payer: MEDICARE

## 2023-12-19 ENCOUNTER — LAB (OUTPATIENT)
Dept: LAB | Facility: OTHER | Age: 73
End: 2023-12-19
Attending: NURSE PRACTITIONER
Payer: MEDICARE

## 2023-12-19 ENCOUNTER — ONCOLOGY VISIT (OUTPATIENT)
Dept: ONCOLOGY | Facility: OTHER | Age: 73
End: 2023-12-19
Attending: NURSE PRACTITIONER
Payer: MEDICARE

## 2023-12-19 VITALS
WEIGHT: 165 LBS | SYSTOLIC BLOOD PRESSURE: 122 MMHG | HEART RATE: 82 BPM | BODY MASS INDEX: 29.23 KG/M2 | TEMPERATURE: 98.2 F | OXYGEN SATURATION: 97 % | DIASTOLIC BLOOD PRESSURE: 76 MMHG | HEIGHT: 63 IN

## 2023-12-19 DIAGNOSIS — M89.9 BONE LESION: ICD-10-CM

## 2023-12-19 DIAGNOSIS — C90.00 MULTIPLE MYELOMA NOT HAVING ACHIEVED REMISSION (H): Primary | ICD-10-CM

## 2023-12-19 LAB
BASOPHILS # BLD AUTO: 0 10E3/UL (ref 0–0.2)
BASOPHILS NFR BLD AUTO: 1 %
EOSINOPHIL # BLD AUTO: 0.3 10E3/UL (ref 0–0.7)
EOSINOPHIL NFR BLD AUTO: 4 %
ERYTHROCYTE [DISTWIDTH] IN BLOOD BY AUTOMATED COUNT: 14.5 % (ref 10–15)
HCT VFR BLD AUTO: 37.1 % (ref 35–47)
HGB BLD-MCNC: 13 G/DL (ref 11.7–15.7)
IMM GRANULOCYTES # BLD: 0 10E3/UL
IMM GRANULOCYTES NFR BLD: 1 %
LYMPHOCYTES # BLD AUTO: 2.1 10E3/UL (ref 0.8–5.3)
LYMPHOCYTES NFR BLD AUTO: 34 %
MCH RBC QN AUTO: 31.7 PG (ref 26.5–33)
MCHC RBC AUTO-ENTMCNC: 35 G/DL (ref 31.5–36.5)
MCV RBC AUTO: 91 FL (ref 78–100)
MONOCYTES # BLD AUTO: 0.5 10E3/UL (ref 0–1.3)
MONOCYTES NFR BLD AUTO: 8 %
NEUTROPHILS # BLD AUTO: 3.3 10E3/UL (ref 1.6–8.3)
NEUTROPHILS NFR BLD AUTO: 52 %
NRBC # BLD AUTO: 0 10E3/UL
NRBC BLD AUTO-RTO: 0 /100
PLATELET # BLD AUTO: 234 10E3/UL (ref 150–450)
RBC # BLD AUTO: 4.1 10E6/UL (ref 3.8–5.2)
WBC # BLD AUTO: 6.3 10E3/UL (ref 4–11)

## 2023-12-19 PROCEDURE — 250N000011 HC RX IP 250 OP 636: Mod: JZ | Performed by: NURSE PRACTITIONER

## 2023-12-19 PROCEDURE — 36415 COLL VENOUS BLD VENIPUNCTURE: CPT | Mod: ZL | Performed by: NURSE PRACTITIONER

## 2023-12-19 PROCEDURE — 99214 OFFICE O/P EST MOD 30 MIN: CPT | Performed by: NURSE PRACTITIONER

## 2023-12-19 PROCEDURE — 36415 COLL VENOUS BLD VENIPUNCTURE: CPT | Mod: ZL

## 2023-12-19 PROCEDURE — G0463 HOSPITAL OUTPT CLINIC VISIT: HCPCS | Mod: 25

## 2023-12-19 PROCEDURE — 85025 COMPLETE CBC W/AUTO DIFF WBC: CPT | Mod: ZL | Performed by: NURSE PRACTITIONER

## 2023-12-19 PROCEDURE — 96401 CHEMO ANTI-NEOPL SQ/IM: CPT

## 2023-12-19 PROCEDURE — G0463 HOSPITAL OUTPT CLINIC VISIT: HCPCS

## 2023-12-19 RX ORDER — DIPHENHYDRAMINE HCL 50 MG
50 CAPSULE ORAL ONCE
Status: COMPLETED | OUTPATIENT
Start: 2023-12-19 | End: 2023-12-19

## 2023-12-19 RX ORDER — DIPHENHYDRAMINE HYDROCHLORIDE 50 MG/ML
50 INJECTION INTRAMUSCULAR; INTRAVENOUS
Status: CANCELLED
Start: 2024-01-02

## 2023-12-19 RX ORDER — METHYLPREDNISOLONE SODIUM SUCCINATE 125 MG/2ML
125 INJECTION, POWDER, LYOPHILIZED, FOR SOLUTION INTRAMUSCULAR; INTRAVENOUS
Status: CANCELLED
Start: 2024-01-02

## 2023-12-19 RX ORDER — DEXAMETHASONE 4 MG/1
12 TABLET ORAL
Status: CANCELLED | OUTPATIENT
Start: 2024-01-02

## 2023-12-19 RX ORDER — ACETAMINOPHEN 325 MG/1
650 TABLET ORAL
Status: CANCELLED | OUTPATIENT
Start: 2024-01-02

## 2023-12-19 RX ORDER — EPINEPHRINE 1 MG/ML
0.3 INJECTION, SOLUTION, CONCENTRATE INTRAVENOUS EVERY 5 MIN PRN
Status: CANCELLED | OUTPATIENT
Start: 2023-12-19

## 2023-12-19 RX ORDER — MONTELUKAST SODIUM 10 MG/1
10 TABLET ORAL ONCE
Status: CANCELLED | OUTPATIENT
Start: 2024-01-02

## 2023-12-19 RX ORDER — LORAZEPAM 2 MG/ML
0.5 INJECTION INTRAMUSCULAR EVERY 4 HOURS PRN
Status: CANCELLED | OUTPATIENT
Start: 2023-12-19

## 2023-12-19 RX ORDER — DEXAMETHASONE 4 MG/1
12 TABLET ORAL ONCE
Status: COMPLETED | OUTPATIENT
Start: 2023-12-19 | End: 2023-12-19

## 2023-12-19 RX ORDER — ALBUTEROL SULFATE 0.83 MG/ML
2.5 SOLUTION RESPIRATORY (INHALATION)
Status: CANCELLED | OUTPATIENT
Start: 2023-12-19

## 2023-12-19 RX ORDER — MONTELUKAST SODIUM 10 MG/1
10 TABLET ORAL ONCE
Status: COMPLETED | OUTPATIENT
Start: 2023-12-19 | End: 2023-12-19

## 2023-12-19 RX ORDER — ACETAMINOPHEN 325 MG/1
650 TABLET ORAL ONCE
Status: COMPLETED | OUTPATIENT
Start: 2023-12-19 | End: 2023-12-19

## 2023-12-19 RX ORDER — ACETAMINOPHEN 325 MG/1
650 TABLET ORAL
Status: CANCELLED | OUTPATIENT
Start: 2023-12-19

## 2023-12-19 RX ORDER — DIPHENHYDRAMINE HCL 50 MG
50 CAPSULE ORAL
Status: CANCELLED | OUTPATIENT
Start: 2024-01-02

## 2023-12-19 RX ORDER — MEPERIDINE HYDROCHLORIDE 25 MG/ML
25 INJECTION INTRAMUSCULAR; INTRAVENOUS; SUBCUTANEOUS EVERY 30 MIN PRN
Status: CANCELLED | OUTPATIENT
Start: 2024-01-02

## 2023-12-19 RX ORDER — ALBUTEROL SULFATE 90 UG/1
1-2 AEROSOL, METERED RESPIRATORY (INHALATION)
Status: CANCELLED
Start: 2023-12-19

## 2023-12-19 RX ORDER — HEPARIN SODIUM (PORCINE) LOCK FLUSH IV SOLN 100 UNIT/ML 100 UNIT/ML
5 SOLUTION INTRAVENOUS
Status: CANCELLED | OUTPATIENT
Start: 2024-01-02

## 2023-12-19 RX ORDER — ALBUTEROL SULFATE 0.83 MG/ML
2.5 SOLUTION RESPIRATORY (INHALATION)
Status: CANCELLED | OUTPATIENT
Start: 2024-01-02

## 2023-12-19 RX ORDER — METHYLPREDNISOLONE SODIUM SUCCINATE 125 MG/2ML
125 INJECTION, POWDER, LYOPHILIZED, FOR SOLUTION INTRAMUSCULAR; INTRAVENOUS
Status: CANCELLED
Start: 2023-12-19

## 2023-12-19 RX ORDER — HEPARIN SODIUM,PORCINE 10 UNIT/ML
5-20 VIAL (ML) INTRAVENOUS DAILY PRN
Status: CANCELLED | OUTPATIENT
Start: 2024-01-02

## 2023-12-19 RX ORDER — MEPERIDINE HYDROCHLORIDE 25 MG/ML
25 INJECTION INTRAMUSCULAR; INTRAVENOUS; SUBCUTANEOUS EVERY 30 MIN PRN
Status: CANCELLED | OUTPATIENT
Start: 2023-12-19

## 2023-12-19 RX ORDER — DEXAMETHASONE 4 MG/1
12 TABLET ORAL ONCE
Status: CANCELLED | OUTPATIENT
Start: 2024-01-02

## 2023-12-19 RX ORDER — DIPHENHYDRAMINE HYDROCHLORIDE 50 MG/ML
50 INJECTION INTRAMUSCULAR; INTRAVENOUS
Status: CANCELLED
Start: 2023-12-19

## 2023-12-19 RX ORDER — LORAZEPAM 2 MG/ML
0.5 INJECTION INTRAMUSCULAR EVERY 4 HOURS PRN
Status: CANCELLED | OUTPATIENT
Start: 2024-01-02

## 2023-12-19 RX ORDER — ACETAMINOPHEN 325 MG/1
650 TABLET ORAL ONCE
Status: CANCELLED | OUTPATIENT
Start: 2024-01-02

## 2023-12-19 RX ORDER — HEPARIN SODIUM,PORCINE 10 UNIT/ML
5-20 VIAL (ML) INTRAVENOUS DAILY PRN
Status: CANCELLED | OUTPATIENT
Start: 2023-12-19

## 2023-12-19 RX ORDER — DEXAMETHASONE 4 MG/1
12 TABLET ORAL
Status: CANCELLED | OUTPATIENT
Start: 2023-12-19

## 2023-12-19 RX ORDER — DIPHENHYDRAMINE HCL 50 MG
50 CAPSULE ORAL
Status: CANCELLED | OUTPATIENT
Start: 2023-12-19

## 2023-12-19 RX ORDER — HEPARIN SODIUM (PORCINE) LOCK FLUSH IV SOLN 100 UNIT/ML 100 UNIT/ML
5 SOLUTION INTRAVENOUS
Status: CANCELLED | OUTPATIENT
Start: 2023-12-19

## 2023-12-19 RX ORDER — ALBUTEROL SULFATE 90 UG/1
1-2 AEROSOL, METERED RESPIRATORY (INHALATION)
Status: CANCELLED
Start: 2024-01-02

## 2023-12-19 RX ORDER — EPINEPHRINE 1 MG/ML
0.3 INJECTION, SOLUTION, CONCENTRATE INTRAVENOUS EVERY 5 MIN PRN
Status: CANCELLED | OUTPATIENT
Start: 2024-01-02

## 2023-12-19 RX ORDER — DIPHENHYDRAMINE HCL 50 MG
50 CAPSULE ORAL ONCE
Status: CANCELLED | OUTPATIENT
Start: 2024-01-02

## 2023-12-19 RX ADMIN — ACETAMINOPHEN 650 MG: 325 TABLET ORAL at 09:41

## 2023-12-19 RX ADMIN — DARATUMUMAB AND HYALURONIDASE-FIHJ (HUMAN RECOMBINANT) 1800 MG: 1800; 30000 INJECTION SUBCUTANEOUS at 10:21

## 2023-12-19 RX ADMIN — DEXAMETHASONE 12 MG: 4 TABLET ORAL at 09:41

## 2023-12-19 RX ADMIN — Medication 50 MG: at 09:41

## 2023-12-19 RX ADMIN — MONTELUKAST SODIUM 10 MG: 10 TABLET ORAL at 09:41

## 2023-12-19 ASSESSMENT — PAIN SCALES - GENERAL: PAINLEVEL: NO PAIN (0)

## 2023-12-19 NOTE — PATIENT INSTRUCTIONS
Thank you for allowing me to be a part of your care today.    Follow-up in 2 weeks for infusion with labs (CBC only in Infusion) prior (no provider that day).   Labs only in clinic lab in 3 weeks-->Myeloma labs and CMP.   Follow-up with me in 4 weeks with labs (CBC to be drawn in Medical Oncology) prior and planned infusion afterwards.     If you have any questions please call [phone]    Other instructions:      None

## 2023-12-19 NOTE — PROGRESS NOTES
Infusion Nursing Note:  Carmelita Watts presents today for faspro.    Patient seen by provider today: Yes: Cynthia Gonzalez   present during visit today: Not Applicable.    Note: Patient reports feeling well, offers no complaints or concerns at this time.  .      Intravenous Access:  No Intravenous access/labs at this visit.    Treatment Conditions:  Lab Results   Component Value Date    HGB 13.0 12/19/2023    WBC 6.3 12/19/2023    ANEU 1.0 (L) 01/03/2023    ANEUTAUTO 3.3 12/19/2023     12/19/2023        Lab Results   Component Value Date     12/12/2023    POTASSIUM 4.1 12/12/2023    CR 0.72 12/12/2023    PAYAL 9.4 12/12/2023    BILITOTAL 0.3 12/12/2023    ALBUMIN 4.3 12/12/2023    ALT 14 12/12/2023    AST 18 12/12/2023       Results reviewed, labs MET treatment parameters, ok to proceed with treatment.      Post Infusion Assessment:  Patient tolerated injection without incident.       Discharge Plan:   Copy of AVS reviewed with patient and/or family.  Patient will return 2 weeks for next appointment.  Patient discharged in stable condition accompanied by: self.  Departure Mode: Ambulatory.      Anne Lan RN

## 2023-12-19 NOTE — NURSING NOTE
"Oncology Rooming Note    December 19, 2023 9:05 AM   Carmelita Watts is a 73 year old female who presents for:    Chief Complaint   Patient presents with    Oncology Clinic Visit     Follow up. Multiple myeloma not having achieved remission      Initial Vitals: /76 (BP Location: Left arm, Patient Position: Sitting, Cuff Size: Adult Regular)   Pulse 82   Temp 98.2  F (36.8  C) (Tympanic)   Ht 1.6 m (5' 3\")   Wt 74.8 kg (165 lb)   SpO2 97%   BMI 29.23 kg/m   Estimated body mass index is 29.23 kg/m  as calculated from the following:    Height as of this encounter: 1.6 m (5' 3\").    Weight as of this encounter: 74.8 kg (165 lb). Body surface area is 1.82 meters squared.  No Pain (0) Comment: Data Unavailable   No LMP recorded. Patient is postmenopausal.  Allergies reviewed: Yes  Medications reviewed: Yes    Medications: Medication refills not needed today.  Pharmacy name entered into [facility]:    [id number] - [place] - [address] AT [facility] [address]  [facility] - [place], [place] - [address] [facility] - [place] - [address]  [facility]    Clinical concerns: none       Julia Andrade LPN              "

## 2023-12-28 DIAGNOSIS — I10 ESSENTIAL HYPERTENSION WITH GOAL BLOOD PRESSURE LESS THAN 140/90: ICD-10-CM

## 2023-12-28 NOTE — TELEPHONE ENCOUNTER
losartan (COZAAR) 50 MG tablet       Last Written Prescription Date:  11-16-22  Last Fill Quantity: 90,   # refills: 3  Last Office Visit: 11-27-23  Future Office visit:    Next 5 appointments (look out 90 days)      Jan 16, 2024 10:50 AM  (Arrive by 10:35 AM)  Return Visit with FLO [facility] ([facility] ) [address] [facility] [address]  [phone]     Mar 04, 2024 10:15 AM  (Arrive by 10:00 AM)  Nurse Only with Cely Arteaga  [facility] ([facility] ) [address]  [facility] [address]  [phone]             Routing refill request to provider for review/approval because:

## 2023-12-29 RX ORDER — LOSARTAN POTASSIUM 50 MG/1
50 TABLET ORAL DAILY
Qty: 90 TABLET | Refills: 3 | Status: SHIPPED | OUTPATIENT
Start: 2023-12-29

## 2024-01-02 ENCOUNTER — INFUSION THERAPY VISIT (OUTPATIENT)
Dept: INFUSION THERAPY | Facility: OTHER | Age: 74
End: 2024-01-02
Attending: PHYSICIAN ASSISTANT
Payer: MEDICARE

## 2024-01-02 VITALS
HEART RATE: 75 BPM | BODY MASS INDEX: 30.23 KG/M2 | HEIGHT: 63 IN | SYSTOLIC BLOOD PRESSURE: 132 MMHG | TEMPERATURE: 97.9 F | WEIGHT: 170.6 LBS | DIASTOLIC BLOOD PRESSURE: 83 MMHG

## 2024-01-02 DIAGNOSIS — C90.00 MULTIPLE MYELOMA NOT HAVING ACHIEVED REMISSION (H): Primary | ICD-10-CM

## 2024-01-02 LAB
BASOPHILS # BLD AUTO: 0 10E3/UL (ref 0–0.2)
BASOPHILS NFR BLD AUTO: 1 %
EOSINOPHIL # BLD AUTO: 0.3 10E3/UL (ref 0–0.7)
EOSINOPHIL NFR BLD AUTO: 5 %
ERYTHROCYTE [DISTWIDTH] IN BLOOD BY AUTOMATED COUNT: 14.2 % (ref 10–15)
HCT VFR BLD AUTO: 39 % (ref 35–47)
HGB BLD-MCNC: 13.3 G/DL (ref 11.7–15.7)
IMM GRANULOCYTES # BLD: 0 10E3/UL
IMM GRANULOCYTES NFR BLD: 0 %
LYMPHOCYTES # BLD AUTO: 2 10E3/UL (ref 0.8–5.3)
LYMPHOCYTES NFR BLD AUTO: 37 %
MCH RBC QN AUTO: 31.3 PG (ref 26.5–33)
MCHC RBC AUTO-ENTMCNC: 34.1 G/DL (ref 31.5–36.5)
MCV RBC AUTO: 92 FL (ref 78–100)
MONOCYTES # BLD AUTO: 0.5 10E3/UL (ref 0–1.3)
MONOCYTES NFR BLD AUTO: 10 %
NEUTROPHILS # BLD AUTO: 2.6 10E3/UL (ref 1.6–8.3)
NEUTROPHILS NFR BLD AUTO: 47 %
NRBC # BLD AUTO: 0 10E3/UL
NRBC BLD AUTO-RTO: 0 /100
PLATELET # BLD AUTO: 210 10E3/UL (ref 150–450)
RBC # BLD AUTO: 4.25 10E6/UL (ref 3.8–5.2)
WBC # BLD AUTO: 5.4 10E3/UL (ref 4–11)

## 2024-01-02 PROCEDURE — 250N000011 HC RX IP 250 OP 636: Mod: JZ | Performed by: NURSE PRACTITIONER

## 2024-01-02 PROCEDURE — 85025 COMPLETE CBC W/AUTO DIFF WBC: CPT | Mod: ZL | Performed by: NURSE PRACTITIONER

## 2024-01-02 PROCEDURE — 36415 COLL VENOUS BLD VENIPUNCTURE: CPT | Mod: ZL | Performed by: NURSE PRACTITIONER

## 2024-01-02 PROCEDURE — 96401 CHEMO ANTI-NEOPL SQ/IM: CPT

## 2024-01-02 RX ORDER — ACETAMINOPHEN 325 MG/1
650 TABLET ORAL ONCE
Status: COMPLETED | OUTPATIENT
Start: 2024-01-02 | End: 2024-01-02

## 2024-01-02 RX ORDER — DEXAMETHASONE 4 MG/1
12 TABLET ORAL ONCE
Status: COMPLETED | OUTPATIENT
Start: 2024-01-02 | End: 2024-01-02

## 2024-01-02 RX ORDER — DIPHENHYDRAMINE HCL 50 MG
50 CAPSULE ORAL ONCE
Status: COMPLETED | OUTPATIENT
Start: 2024-01-02 | End: 2024-01-02

## 2024-01-02 RX ORDER — MONTELUKAST SODIUM 10 MG/1
10 TABLET ORAL ONCE
Status: COMPLETED | OUTPATIENT
Start: 2024-01-02 | End: 2024-01-02

## 2024-01-02 RX ADMIN — MONTELUKAST SODIUM 10 MG: 10 TABLET ORAL at 08:51

## 2024-01-02 RX ADMIN — DEXAMETHASONE 12 MG: 4 TABLET ORAL at 08:51

## 2024-01-02 RX ADMIN — Medication 50 MG: at 08:51

## 2024-01-02 RX ADMIN — DARATUMUMAB AND HYALURONIDASE-FIHJ (HUMAN RECOMBINANT) 1800 MG: 1800; 30000 INJECTION SUBCUTANEOUS at 09:28

## 2024-01-02 RX ADMIN — ACETAMINOPHEN 650 MG: 325 TABLET ORAL at 08:51

## 2024-01-02 NOTE — PATIENT INSTRUCTIONS
Thank you for scheduling your appointment with us today. If you have any questions or concerns related to procedure/medication at today's visit, please contact your primary care provider, or the provider who ordered today's procedure/medication. If you have any questions related to scheduling with our unit, or if you are having trouble getting in contact with your ordering provider, we can be reached at [phone].

## 2024-01-09 ENCOUNTER — LAB (OUTPATIENT)
Dept: LAB | Facility: OTHER | Age: 74
End: 2024-01-09
Payer: MEDICARE

## 2024-01-09 DIAGNOSIS — C90.00 MULTIPLE MYELOMA NOT HAVING ACHIEVED REMISSION (H): ICD-10-CM

## 2024-01-09 LAB
ALBUMIN SERPL BCG-MCNC: 4.4 G/DL (ref 3.5–5.2)
ALP SERPL-CCNC: 143 U/L (ref 40–150)
ALT SERPL W P-5'-P-CCNC: 14 U/L (ref 0–50)
ANION GAP SERPL CALCULATED.3IONS-SCNC: 9 MMOL/L (ref 7–15)
AST SERPL W P-5'-P-CCNC: 20 U/L (ref 0–45)
BASOPHILS # BLD AUTO: 0 10E3/UL (ref 0–0.2)
BASOPHILS NFR BLD AUTO: 0 %
BILIRUB SERPL-MCNC: 0.3 MG/DL
BUN SERPL-MCNC: 21.7 MG/DL (ref 8–23)
CALCIUM SERPL-MCNC: 9.4 MG/DL (ref 8.8–10.2)
CHLORIDE SERPL-SCNC: 100 MMOL/L (ref 98–107)
CREAT SERPL-MCNC: 0.81 MG/DL (ref 0.51–0.95)
DEPRECATED HCO3 PLAS-SCNC: 26 MMOL/L (ref 22–29)
EGFRCR SERPLBLD CKD-EPI 2021: 75 ML/MIN/1.73M2
EOSINOPHIL # BLD AUTO: 0.2 10E3/UL (ref 0–0.7)
EOSINOPHIL NFR BLD AUTO: 3 %
ERYTHROCYTE [DISTWIDTH] IN BLOOD BY AUTOMATED COUNT: 14.3 % (ref 10–15)
GLUCOSE SERPL-MCNC: 115 MG/DL (ref 70–99)
HCT VFR BLD AUTO: 40.4 % (ref 35–47)
HGB BLD-MCNC: 13.6 G/DL (ref 11.7–15.7)
IMM GRANULOCYTES # BLD: 0 10E3/UL
IMM GRANULOCYTES NFR BLD: 1 %
LYMPHOCYTES # BLD AUTO: 2.1 10E3/UL (ref 0.8–5.3)
LYMPHOCYTES NFR BLD AUTO: 34 %
MCH RBC QN AUTO: 31 PG (ref 26.5–33)
MCHC RBC AUTO-ENTMCNC: 33.7 G/DL (ref 31.5–36.5)
MCV RBC AUTO: 92 FL (ref 78–100)
MONOCYTES # BLD AUTO: 0.5 10E3/UL (ref 0–1.3)
MONOCYTES NFR BLD AUTO: 9 %
NEUTROPHILS # BLD AUTO: 3.2 10E3/UL (ref 1.6–8.3)
NEUTROPHILS NFR BLD AUTO: 53 %
NRBC # BLD AUTO: 0 10E3/UL
NRBC BLD AUTO-RTO: 0 /100
PLATELET # BLD AUTO: 199 10E3/UL (ref 150–450)
POTASSIUM SERPL-SCNC: 3.9 MMOL/L (ref 3.4–5.3)
PROT SERPL-MCNC: 7.1 G/DL (ref 6.4–8.3)
RBC # BLD AUTO: 4.39 10E6/UL (ref 3.8–5.2)
SODIUM SERPL-SCNC: 135 MMOL/L (ref 135–145)
WBC # BLD AUTO: 6.1 10E3/UL (ref 4–11)

## 2024-01-09 PROCEDURE — 85810 BLOOD VISCOSITY EXAMINATION: CPT | Mod: ZL

## 2024-01-09 PROCEDURE — 80053 COMPREHEN METABOLIC PANEL: CPT | Mod: ZL

## 2024-01-09 PROCEDURE — 84165 PROTEIN E-PHORESIS SERUM: CPT | Mod: 26 | Performed by: PATHOLOGY

## 2024-01-09 PROCEDURE — 84155 ASSAY OF PROTEIN SERUM: CPT | Mod: ZL

## 2024-01-09 PROCEDURE — 86334 IMMUNOFIX E-PHORESIS SERUM: CPT | Mod: ZL | Performed by: PATHOLOGY

## 2024-01-09 PROCEDURE — 84165 PROTEIN E-PHORESIS SERUM: CPT | Mod: ZL | Performed by: PATHOLOGY

## 2024-01-09 PROCEDURE — 82784 ASSAY IGA/IGD/IGG/IGM EACH: CPT | Mod: ZL

## 2024-01-09 PROCEDURE — 82232 ASSAY OF BETA-2 PROTEIN: CPT | Mod: ZL

## 2024-01-09 PROCEDURE — 83521 IG LIGHT CHAINS FREE EACH: CPT | Mod: ZL

## 2024-01-09 PROCEDURE — 85025 COMPLETE CBC W/AUTO DIFF WBC: CPT | Mod: ZL

## 2024-01-09 PROCEDURE — 86334 IMMUNOFIX E-PHORESIS SERUM: CPT | Mod: 26 | Performed by: PATHOLOGY

## 2024-01-09 PROCEDURE — 36415 COLL VENOUS BLD VENIPUNCTURE: CPT | Mod: ZL

## 2024-01-10 LAB
B2 MICROGLOB TUMOR MARKER SER-MCNC: 2.7 MG/L
IGA SERPL-MCNC: 16 MG/DL (ref 84–499)
IGG SERPL-MCNC: 1086 MG/DL (ref 610–1616)
IGM SERPL-MCNC: 22 MG/DL (ref 35–242)
KAPPA LC FREE SER-MCNC: 0.56 MG/DL (ref 0.33–1.94)
KAPPA LC FREE/LAMBDA FREE SER NEPH: 1.7 {RATIO} (ref 0.26–1.65)
LAMBDA LC FREE SERPL-MCNC: 0.33 MG/DL (ref 0.57–2.63)
LOCATION OF TASK: NORMAL
PROT PATTERN SERPL IFE-IMP: NORMAL
TOTAL PROTEIN SERUM FOR ELP: 6.8 G/DL (ref 6.4–8.3)
VISC SER: 1.5 CP (ref 1.4–1.8)

## 2024-01-11 LAB
ALBUMIN SERPL ELPH-MCNC: 4.2 G/DL (ref 3.7–5.1)
ALPHA1 GLOB SERPL ELPH-MCNC: 0.3 G/DL (ref 0.2–0.4)
ALPHA2 GLOB SERPL ELPH-MCNC: 0.7 G/DL (ref 0.5–0.9)
B-GLOBULIN SERPL ELPH-MCNC: 0.7 G/DL (ref 0.6–1)
GAMMA GLOB SERPL ELPH-MCNC: 1 G/DL (ref 0.7–1.6)
LOCATION OF TASK: ABNORMAL
M PROTEIN SERPL ELPH-MCNC: 0.2 G/DL
PROT PATTERN SERPL ELPH-IMP: ABNORMAL

## 2024-01-15 NOTE — PROGRESS NOTES
Oncology Follow-up Visit    Reason for Visit:  Carmelita is a 73 year old woman with a diagnosis of multiple myeloma, who I am visiting with today for routine follow-up and consideration of ongoing therapy.     Nursing Note and documentation reviewed: Yes    Interval History:   Carlene continues to feel she is doing well. Does occasionally feel a bit of fatigue but pushes through, resting as needed. No real change. She feels like she was reading some self-help books and feels at a better place in regards to her mental health. No recent infection. No bleeding concerns. Appetite has been great but with weight gain, feels the need to change so will be working on some weight loss. No nausea, vomiting, diarrhea, or constipation. No skin concerns. Overall, doing well.     Oncologic History  12/31/2018 Presented to the emergency room with vertigo and fatigue.  CT scan of the head was negative and subsequent stress test was negative.  05/03/2019 PCP ordered lab work and noted a total protein of 12.9.  SPEP at that time showed an M spike of 6.2 with a large monoclonal protein seen in the gamma fraction. Urine immunofixation showed a possible small protein band in the gamma fraction  05/31/2019 Evaluated by Dr. Walker with Medical Oncology with plan to rule out myeloma and obtain bone marrow aspiration biopsy as well as a metastatic bone survey along with additional labwork  06/18/2019 Underwent bone marrow aspiration and biopsy  06/24/2019 Seen again by Dr. Walker and CBC showed a hemoglobin of 9.3, M spike 7.3 with monoclonal IgG immunoglobulin of kappa light chain type; serum viscosity was 2.9; quantitative immunoglobulins showed an IgG of 8160, beta-2 microglobulin was 5.8, BUN was 21 with creatinine is 0.8 and total protein was 13.7.  Quantitative kappa/lambda free light chains showed an elevated ratio of 17.0 bone marrow aspiration biopsy showed plasma cell myeloma with approximately 80% plasma cells.  Immunofixation  showed IgG kappa and flow cytometry revealed kappa monotypic plasma cells consistent with clonal plasma cell neoplasm and FISH panel was pending at that time. It was felt she had at least stage II disease based on her beta-2 microglobulin and anemia. Plan was to treat with Velcade 1.3 mg per/m2 days 1, 4, 8 and 11/Decadron 40 mg on days 1, 8 and 15. Initiation of Revlimid with C2 at 25 mg daily days 1 through 14.  Plan was to also obtain an MRI of the lumbar spine to rule out lytic lesion at L3.  She was initiated on Zovirax 400 mg p.o. twice daily.  06/25/2019 C1 of chemotherapy initiated  07/01/2019 Note in chart regarding patient's large co-pay for the Revlimid and no plan at this point to initiate Revlimid and treat only with Velcade and Decadron per Dr. Walker  07/11/2019 MRI lumbar spine shows a pathologic superior endplate compression fracture at L3 without evidence of retropulsed fragment and innumerable enhancing lesions throughout the lumbar spine consistent with history of multiple myeloma.  09/10/2019 Increasing M-spike and kappa lambda ratio  10/01/2019 Initiation of CyBorD  11/17/2020 Plateau of M-spike at 1.2 after 36 cycles of CyBorD  12/01/2020 Initiation of Darzalex Faspro injection  03/23/2021 M-spike increased form 1.0 to 1.2 and velcade and dexamethasone added to plan  04/12/2022 Treatment HOLIDAY commenced with Mspike 0.3 after 22 cycles of treatent  05/27/2022 Received Evusheld while on treatment holiday.   07/18/2023 Mspike 0.8. Other labs stable. PET scan ordered.   08/02/2023 PET completed showing negative study. No evidence of mass or lymphadenopathy. No concerning hypermetabolism is present.  08/14/2023 Met with Dr. Walker. Given biochemical progression, he did feel appropriate to resume therapy again with Darzalex faspro    Current Chemo Regime/TX: Darzalex faspro injection 1800mg subcutaneous per protocol  Current Cycle: 6  Completed cycles: 5  **C1/C2 Days 1, 8, 15,  "22  **C3/C4/C5/C6 Days 1, 15  *C7 and so forth...D1     Previous treatment:   Velcade 1.3 mg/m2 days 1, 4, 8 and 11 with Decadron 40 mg days 1, 8 and 15 x 4 cycles;   Velcade 1.5mg.m2/cyclophosphamide 150mg every 7 days on days 1,8,15 and 22/decadron 40mg days 1,8,15,22 ; Darzalex faspro injection 1800mg subcutaneous per protocol    Past Medical History:   Diagnosis Date    Arthritis     Cyst of breast     Depressive disorder     Diabetes mellitus, type 2 (H) 1/18/2021    Essential hypertension 10/1/2015    Major depressive disorder, recurrent episode, mild (H24) 10/1/2015    Mixed hyperlipidemia 10/1/2015    Multiple myeloma not having achieved remission (H) 6/24/2019    Other specified disorders of bladder 07/09/2012    Irritable Bladder    Seasonal allergies 10/1/2015    Unspecified essential hypertension 03/19/2007    Unspecified sinusitis (chronic) 09/05/2007       Past Surgical History:   Procedure Laterality Date    APPENDECTOMY      BONE MARROW BIOPSY, BONE SPECIMEN, NEEDLE/TROCAR N/A 6/18/2019    Procedure: BONE MARROW BIOPSY;  Surgeon: Maciej Sanz MD;  Location: [facility] OR    CHOLECYSTECTOMY      COLONOSCOPY  07-    repeat 10 years    COLONOSCOPY N/A 12/30/2016    Procedure: COLONOSCOPY;  Surgeon: Bhaskar Franklin DO;  Location: [facility] OR    PUNC/ASPIR BREAST CYST Left 1995    benign    SINUS SURGERY      TUBAL STERILIZATION         Family History   Problem Relation Age of Onset    Breast Cancer Mother 60        Cause of Death    Parkinsonism Father         \"Possible\"    Coronary Artery Disease Father     Pacemaker Father     Thyroid Disease Daughter     Breast Cancer Maternal Aunt         over 50    Diabetes No family hx of     Hypertension No family hx of     Hyperlipidemia No family hx of     Cerebrovascular Disease No family hx of     Colon Cancer No family hx of     Prostate Cancer No family hx of     Genetic Disorder No family hx of     Asthma No family hx of     Anesthesia Reaction No family " hx of        Social History     Socioeconomic History    Marital status:      Spouse name: Not on file    Number of children: Not on file    Years of education: Not on file    Highest education level: Not on file   Occupational History    Occupation: Financial     Comment:  - (FT)   Tobacco Use    Smoking status: Never    Smokeless tobacco: Never    Tobacco comments:     Tried to Quit (YES); QUIT in 1971; Passive Exposure (NO)   Vaping Use    Vaping Use: Never used   Substance and Sexual Activity    Alcohol use: Yes     Comment: RARELY    Drug use: No    Sexual activity: Yes     Partners: Male     Birth control/protection: None   Other Topics Concern     Service Not Asked    Blood Transfusions Not Asked    Caffeine Concern Yes     Comment: Coffee >6 cups daily    Occupational Exposure Not Asked    Hobby Hazards Not Asked    Sleep Concern Not Asked    Stress Concern Not Asked    Weight Concern Not Asked    Special Diet Not Asked    Back Care Not Asked    Exercise Not Asked    Bike Helmet Not Asked    Seat Belt Not Asked    Self-Exams Not Asked    Parent/sibling w/ CABG, MI or angioplasty before 65F 55M? No   Social History Narrative    Not on file     Social Determinants of Health     Financial Resource Strain: Low Risk  (11/27/2023)    Financial Resource Strain     Within the past 12 months, have you or your family members you live with been unable to get utilities (heat, electricity) when it was really needed?: No   Food Insecurity: Low Risk  (11/27/2023)    Food Insecurity     Within the past 12 months, did you worry that your food would run out before you got money to buy more?: No     Within the past 12 months, did the food you bought just not last and you didn t have money to get more?: No   Transportation Needs: Low Risk  (11/27/2023)    Transportation Needs     Within the past 12 months, has lack of transportation kept you from medical appointments, getting your medicines,  non-medical meetings or appointments, work, or from getting things that you need?: No   Physical Activity: Not on file   Stress: Not on file   Social Connections: Not on file   Interpersonal Safety: Low Risk  (11/27/2023)    Interpersonal Safety     Do you feel physically and emotionally safe where you currently live?: Yes     Within the past 12 months, have you been hit, slapped, kicked or otherwise physically hurt by someone?: No     Within the past 12 months, have you been humiliated or emotionally abused in other ways by your partner or ex-partner?: No   Housing Stability: Low Risk  (11/27/2023)    Housing Stability     Do you have housing? : Yes     Are you worried about losing your housing?: No       Current Outpatient Medications   Medication    acyclovir (ZOVIRAX) 400 MG tablet    aspirin (ASA) 81 MG chewable tablet    atorvastatin (LIPITOR) 20 MG tablet    diphenhydrAMINE (BENADRYL) 25 MG capsule    fluticasone (FLONASE) 50 MCG/ACT nasal spray    L-Lysine HCl 500 MG CAPS    losartan (COZAAR) 50 MG tablet    sertraline (ZOLOFT) 50 MG tablet     No current facility-administered medications for this visit.     Facility-Administered Medications Ordered in Other Visits   Medication    acetaminophen (TYLENOL) tablet 650 mg    daratumumab-hyaluronidase-fihj (DARZALEX FASPRO) SUBCUTANEOUS injection 1,800 mg    dexAMETHasone (DECADRON) tablet 12 mg    diphenhydrAMINE (BENADRYL) capsule 50 mg    montelukast (SINGULAIR) tablet 10 mg        Allergies   Allergen Reactions    Lisinopril Cough    Phenylephrine Hcl Other (See Comments)     **Entex  HEADACHE (SEVERE)    Phenylpropanolamine Other (See Comments)     **Entex  HEADACHE (SEVERE)    Pseudoephedrine Tannate Other (See Comments)     **Entex  HEADACHE (SEVERE)    Levofloxacin Rash     **Levaquin    Moxifloxacin Hcl [Moxifloxacin Hcl In Nacl] Rash       Review Of Systems:  A complete review of systems is negative except for the above mentioned items in the interval  "history.     Physical Exam:  /78   Pulse 84   Temp 97.8  F (36.6  C) (Tympanic)   Resp 20   Ht 1.6 m (5' 3\")   Wt 78.3 kg (172 lb 9.9 oz)   SpO2 95%   BMI 30.58 kg/m    GENERAL APPEARANCE: Healthy, alert and in no acute distress.  HEENT: Eyes appear normal without scleral icterus. Extraocular movements intact.   NECK:   Supple with normal range of motion. No asymmetry or masses.  LYMPHATICS: No palpable cervical, supraclavicular nodes.  RESP: Lungs clear to auscultation bilaterally, respirations regular and easy.  CARDIOVASCULAR: Regular rate and rhythm. Normal S1, S2; no murmur, gallop, or rub.  ABDOMEN: Soft, non-tender, non-distended. No palpable organomegaly or masses.  MUSCULOSKELETAL: Extremities without gross deformities noted. No edema of bilateral lower extremities.  SKIN: No suspicious lesions or rashes.  NEURO: Alert and oriented x 3.  Gait steady.  PSYCHIATRIC: Mentation and affect appear normal.  Mood appropriate.    Laboratory:  Results for orders placed or performed in visit on 01/16/24   CBC with platelets and differential     Status: None   Result Value Ref Range    WBC Count 6.0 4.0 - 11.0 10e3/uL    RBC Count 4.39 3.80 - 5.20 10e6/uL    Hemoglobin 13.7 11.7 - 15.7 g/dL    Hematocrit 40.0 35.0 - 47.0 %    MCV 91 78 - 100 fL    MCH 31.2 26.5 - 33.0 pg    MCHC 34.3 31.5 - 36.5 g/dL    RDW 14.2 10.0 - 15.0 %    Platelet Count 194 150 - 450 10e3/uL    % Neutrophils 53 %    % Lymphocytes 33 %    % Monocytes 10 %    % Eosinophils 3 %    % Basophils 0 %    % Immature Granulocytes 1 %    NRBCs per 100 WBC 0 <1 /100    Absolute Neutrophils 3.2 1.6 - 8.3 10e3/uL    Absolute Lymphocytes 2.0 0.8 - 5.3 10e3/uL    Absolute Monocytes 0.6 0.0 - 1.3 10e3/uL    Absolute Eosinophils 0.2 0.0 - 0.7 10e3/uL    Absolute Basophils 0.0 0.0 - 0.2 10e3/uL    Absolute Immature Granulocytes 0.0 <=0.4 10e3/uL    Absolute NRBCs 0.0 10e3/uL   CBC with platelets differential     Status: None    Narrative    The " following orders were created for panel order CBC with platelets differential.  Procedure                               Abnormality         Status                     ---------                               -----------         ------                     CBC with platelets and d...[[id number]]                      Final result                 Please view results for these tests on the individual orders.       Component      Latest Ref Rng 1/9/2024  9:40 AM   WBC      4.0 - 11.0 10e3/uL 6.1    RBC Count      3.80 - 5.20 10e6/uL 4.39    Hemoglobin      11.7 - 15.7 g/dL 13.6    Hematocrit      35.0 - 47.0 % 40.4    MCV      78 - 100 fL 92    MCH      26.5 - 33.0 pg 31.0    MCHC      31.5 - 36.5 g/dL 33.7    RDW      10.0 - 15.0 % 14.3    Platelet Count      150 - 450 10e3/uL 199    % Neutrophils      % 53    % Lymphocytes      % 34    % Monocytes      % 9    % Eosinophils      % 3    % Basophils      % 0    % Immature Granulocytes      % 1    NRBCs per 100 WBC      <1 /100 0    Absolute Neutrophils      1.6 - 8.3 10e3/uL 3.2    Absolute Lymphocytes      0.8 - 5.3 10e3/uL 2.1    Absolute Monocytes      0.0 - 1.3 10e3/uL 0.5    Absolute Eosinophils      0.0 - 0.7 10e3/uL 0.2    Absolute Basophils      0.0 - 0.2 10e3/uL 0.0    Absolute Immature Granulocytes      <=0.4 10e3/uL 0.0    Absolute NRBCs      10e3/uL 0.0    Sodium      135 - 145 mmol/L 135    Potassium      3.4 - 5.3 mmol/L 3.9    Carbon Dioxide (CO2)      22 - 29 mmol/L 26    Anion Gap      7 - 15 mmol/L 9    Urea Nitrogen      8.0 - 23.0 mg/dL 21.7    Creatinine      0.51 - 0.95 mg/dL 0.81    GFR Estimate      >60 mL/min/1.73m2 75    Calcium      8.8 - 10.2 mg/dL 9.4    Chloride      98 - 107 mmol/L 100    Glucose      70 - 99 mg/dL 115 (H)    Alkaline Phosphatase      40 - 150 U/L 143    AST      0 - 45 U/L 20    ALT      0 - 50 U/L 14    Protein Total      6.4 - 8.3 g/dL 7.1    Albumin      3.5 - 5.2 g/dL 4.4    Bilirubin Total      <=1.2 mg/dL 0.3    Albumin  Fraction      3.7 - 5.1 g/dL 4.2    Alpha 1 Fraction      0.2 - 0.4 g/dL 0.3    Alpha 2 Fraction      0.5 - 0.9 g/dL 0.7    Beta Fraction      0.6 - 1.0 g/dL 0.7    Gamma Fraction      0.7 - 1.6 g/dL 1.0    Monoclonal Peak      <=0.0 g/dL 0.2 (H)    ELP Interpretation: Monoclonal protein (0.2 g/dL) seen in the gamma fraction. See immunofixation report on same specimen. Pathologic significance requires clinical correlation. Basim Tomlinson M.D., Ph.D., Pathologist.    Signout Location if Remote Report signed out at:[facility]    Signout Location if Remote Report signed out at:[facility]    IGG      610 - 1,616 mg/dL 1,086    IGA      84 - 499 mg/dL 16 (L)    IGM      35 - 242 mg/dL 22 (L)    [place] Free Lt Chain      0.33 - 1.94 mg/dL 0.56    Lambda Free Lt Chain      0.57 - 2.63 mg/dL 0.33 (L)    Kappa Lambda Ratio      0.26 - 1.65  1.70 (H)    Immunofixation ELP Monoclonal IgG immunoglobulin of kappa light chain type. Monoclonal antibody therapeutics (e.g. Daratumumab) may appear as monoclonal proteins on serum electrophoresis and immunofixation. Results should be interpreted with caution. Pathologic significance requires clinical correlation. Basim Tomlinson M.D., Ph.D., Pathologist    Beta-2-Microglobulin      <2.3 mg/L 2.7 (H)    Viscosity Index      1.4 - 1.8  1.5    Total Protein Serum for ELP      6.4 - 8.3 g/dL 6.8       Legend:  (H) High  (L) Low    Imaging Studies:  None this visit.     ASSESSMENT/PLAN:  #1 Multiple myeloma IgG kappa multiple myeloma with 80% involvement of the bone marrow diagnosed June 2019 initially treated with Velcade and Decadron and received 4 cycles with increasing M-spike and kappa/lambda ratio.  Treatment changed to CyBorD on 10/1/2019.  M-spike plateau at 1.2 and treatment changed to monotherapy with Darzalex Faspro injection. M spike declined to 1.0 then increased again to 1.2 so Velcade and Dex added to her treatment plan with cycle 5 therapy.     Following C22 (April 2022), she  did initiate a treatment holiday when her Mspike was 0.3. We had followed her counts for over a year. Unfortunately, most recently, she did see a rise in her mspike to 0.8. PET completed was negative. Dr. Walker felt this was a biochemical progression and after discussion with patient, elected to resume treatment with Darzalex faspro.       Today, she presents ready to commence C6. Feeling well and tolerating therapy with modest side effects. No contraindications to proceeding with therapy. I will have labs completed in 3 weeks (myeloma labs) and see her back in 4 weeks with CBC prior. Sooner with any concerns.     #2 Lytic lesions She remains on calcium with vitamin D twice a day. Weight bearing activity. She had her dental exam and received clearance for bisphosphonate. Resumed 12/05/2023. Will be due in March.       Patient in agreement with plan and verbalizes understanding. Agrees to call with any questions or concerns.      30 minutes spent in the patient's encounter today with time spent in review of patient's chart along with chart preparation and review of the treatment plan and signing of treatment plan.  Time was also spent with the patient in obtaining a review of systems and performing a physical exam along with detailed review of all test results. Time was also spent in discussing plan for future follow-up and relating instructions for follow-up and in placing future orders.      FLO Duque, VAMSIP-C

## 2024-01-16 ENCOUNTER — INFUSION THERAPY VISIT (OUTPATIENT)
Dept: INFUSION THERAPY | Facility: OTHER | Age: 74
End: 2024-01-16
Attending: NURSE PRACTITIONER
Payer: MEDICARE

## 2024-01-16 ENCOUNTER — ONCOLOGY VISIT (OUTPATIENT)
Dept: ONCOLOGY | Facility: OTHER | Age: 74
End: 2024-01-16
Attending: NURSE PRACTITIONER
Payer: MEDICARE

## 2024-01-16 VITALS
SYSTOLIC BLOOD PRESSURE: 114 MMHG | HEART RATE: 84 BPM | OXYGEN SATURATION: 95 % | HEIGHT: 63 IN | DIASTOLIC BLOOD PRESSURE: 78 MMHG | TEMPERATURE: 97.8 F | WEIGHT: 172.62 LBS | BODY MASS INDEX: 30.59 KG/M2 | RESPIRATION RATE: 20 BRPM

## 2024-01-16 DIAGNOSIS — C90.00 MULTIPLE MYELOMA NOT HAVING ACHIEVED REMISSION (H): Primary | ICD-10-CM

## 2024-01-16 LAB
BASOPHILS # BLD AUTO: 0 10E3/UL (ref 0–0.2)
BASOPHILS NFR BLD AUTO: 0 %
EOSINOPHIL # BLD AUTO: 0.2 10E3/UL (ref 0–0.7)
EOSINOPHIL NFR BLD AUTO: 3 %
ERYTHROCYTE [DISTWIDTH] IN BLOOD BY AUTOMATED COUNT: 14.2 % (ref 10–15)
HCT VFR BLD AUTO: 40 % (ref 35–47)
HGB BLD-MCNC: 13.7 G/DL (ref 11.7–15.7)
IMM GRANULOCYTES # BLD: 0 10E3/UL
IMM GRANULOCYTES NFR BLD: 1 %
LYMPHOCYTES # BLD AUTO: 2 10E3/UL (ref 0.8–5.3)
LYMPHOCYTES NFR BLD AUTO: 33 %
MCH RBC QN AUTO: 31.2 PG (ref 26.5–33)
MCHC RBC AUTO-ENTMCNC: 34.3 G/DL (ref 31.5–36.5)
MCV RBC AUTO: 91 FL (ref 78–100)
MONOCYTES # BLD AUTO: 0.6 10E3/UL (ref 0–1.3)
MONOCYTES NFR BLD AUTO: 10 %
NEUTROPHILS # BLD AUTO: 3.2 10E3/UL (ref 1.6–8.3)
NEUTROPHILS NFR BLD AUTO: 53 %
NRBC # BLD AUTO: 0 10E3/UL
NRBC BLD AUTO-RTO: 0 /100
PLATELET # BLD AUTO: 194 10E3/UL (ref 150–450)
RBC # BLD AUTO: 4.39 10E6/UL (ref 3.8–5.2)
WBC # BLD AUTO: 6 10E3/UL (ref 4–11)

## 2024-01-16 PROCEDURE — 250N000011 HC RX IP 250 OP 636: Mod: JZ | Performed by: NURSE PRACTITIONER

## 2024-01-16 PROCEDURE — 96401 CHEMO ANTI-NEOPL SQ/IM: CPT

## 2024-01-16 PROCEDURE — 99214 OFFICE O/P EST MOD 30 MIN: CPT | Performed by: NURSE PRACTITIONER

## 2024-01-16 PROCEDURE — G0463 HOSPITAL OUTPT CLINIC VISIT: HCPCS

## 2024-01-16 PROCEDURE — 36415 COLL VENOUS BLD VENIPUNCTURE: CPT | Mod: ZL | Performed by: NURSE PRACTITIONER

## 2024-01-16 PROCEDURE — G0463 HOSPITAL OUTPT CLINIC VISIT: HCPCS | Mod: 25

## 2024-01-16 PROCEDURE — 85025 COMPLETE CBC W/AUTO DIFF WBC: CPT | Mod: ZL | Performed by: NURSE PRACTITIONER

## 2024-01-16 RX ORDER — METHYLPREDNISOLONE SODIUM SUCCINATE 125 MG/2ML
125 INJECTION, POWDER, LYOPHILIZED, FOR SOLUTION INTRAMUSCULAR; INTRAVENOUS
Status: CANCELLED
Start: 2024-01-30

## 2024-01-16 RX ORDER — DEXAMETHASONE 4 MG/1
12 TABLET ORAL
Status: CANCELLED | OUTPATIENT
Start: 2024-01-30

## 2024-01-16 RX ORDER — ALBUTEROL SULFATE 90 UG/1
1-2 AEROSOL, METERED RESPIRATORY (INHALATION)
Status: CANCELLED
Start: 2024-01-16

## 2024-01-16 RX ORDER — LORAZEPAM 2 MG/ML
0.5 INJECTION INTRAMUSCULAR EVERY 4 HOURS PRN
Status: CANCELLED | OUTPATIENT
Start: 2024-01-30

## 2024-01-16 RX ORDER — DIPHENHYDRAMINE HCL 50 MG
50 CAPSULE ORAL
Status: CANCELLED | OUTPATIENT
Start: 2024-01-30

## 2024-01-16 RX ORDER — MONTELUKAST SODIUM 10 MG/1
10 TABLET ORAL ONCE
Status: CANCELLED | OUTPATIENT
Start: 2024-01-16

## 2024-01-16 RX ORDER — HEPARIN SODIUM (PORCINE) LOCK FLUSH IV SOLN 100 UNIT/ML 100 UNIT/ML
5 SOLUTION INTRAVENOUS
Status: CANCELLED | OUTPATIENT
Start: 2024-01-16

## 2024-01-16 RX ORDER — DIPHENHYDRAMINE HYDROCHLORIDE 50 MG/ML
50 INJECTION INTRAMUSCULAR; INTRAVENOUS
Status: CANCELLED
Start: 2024-01-30

## 2024-01-16 RX ORDER — MONTELUKAST SODIUM 10 MG/1
10 TABLET ORAL ONCE
Status: COMPLETED | OUTPATIENT
Start: 2024-01-16 | End: 2024-01-16

## 2024-01-16 RX ORDER — ACETAMINOPHEN 325 MG/1
650 TABLET ORAL ONCE
Status: CANCELLED | OUTPATIENT
Start: 2024-01-16

## 2024-01-16 RX ORDER — DIPHENHYDRAMINE HCL 50 MG
50 CAPSULE ORAL ONCE
Status: CANCELLED | OUTPATIENT
Start: 2024-01-16

## 2024-01-16 RX ORDER — ALBUTEROL SULFATE 0.83 MG/ML
2.5 SOLUTION RESPIRATORY (INHALATION)
Status: CANCELLED | OUTPATIENT
Start: 2024-01-16

## 2024-01-16 RX ORDER — MEPERIDINE HYDROCHLORIDE 25 MG/ML
25 INJECTION INTRAMUSCULAR; INTRAVENOUS; SUBCUTANEOUS EVERY 30 MIN PRN
Status: CANCELLED | OUTPATIENT
Start: 2024-01-16

## 2024-01-16 RX ORDER — DIPHENHYDRAMINE HYDROCHLORIDE 50 MG/ML
50 INJECTION INTRAMUSCULAR; INTRAVENOUS
Status: CANCELLED
Start: 2024-01-16

## 2024-01-16 RX ORDER — DEXAMETHASONE 4 MG/1
12 TABLET ORAL ONCE
Status: COMPLETED | OUTPATIENT
Start: 2024-01-16 | End: 2024-01-16

## 2024-01-16 RX ORDER — ACETAMINOPHEN 325 MG/1
650 TABLET ORAL ONCE
Status: CANCELLED | OUTPATIENT
Start: 2024-01-30

## 2024-01-16 RX ORDER — HEPARIN SODIUM,PORCINE 10 UNIT/ML
5-20 VIAL (ML) INTRAVENOUS DAILY PRN
Status: CANCELLED | OUTPATIENT
Start: 2024-01-30

## 2024-01-16 RX ORDER — DIPHENHYDRAMINE HCL 50 MG
50 CAPSULE ORAL
Status: CANCELLED | OUTPATIENT
Start: 2024-01-16

## 2024-01-16 RX ORDER — DEXAMETHASONE 4 MG/1
12 TABLET ORAL
Status: CANCELLED | OUTPATIENT
Start: 2024-01-16

## 2024-01-16 RX ORDER — HEPARIN SODIUM (PORCINE) LOCK FLUSH IV SOLN 100 UNIT/ML 100 UNIT/ML
5 SOLUTION INTRAVENOUS
Status: CANCELLED | OUTPATIENT
Start: 2024-01-30

## 2024-01-16 RX ORDER — ACETAMINOPHEN 325 MG/1
650 TABLET ORAL
Status: CANCELLED | OUTPATIENT
Start: 2024-01-30

## 2024-01-16 RX ORDER — MEPERIDINE HYDROCHLORIDE 25 MG/ML
25 INJECTION INTRAMUSCULAR; INTRAVENOUS; SUBCUTANEOUS EVERY 30 MIN PRN
Status: CANCELLED | OUTPATIENT
Start: 2024-01-30

## 2024-01-16 RX ORDER — HEPARIN SODIUM,PORCINE 10 UNIT/ML
5-20 VIAL (ML) INTRAVENOUS DAILY PRN
Status: CANCELLED | OUTPATIENT
Start: 2024-01-16

## 2024-01-16 RX ORDER — LORAZEPAM 2 MG/ML
0.5 INJECTION INTRAMUSCULAR EVERY 4 HOURS PRN
Status: CANCELLED | OUTPATIENT
Start: 2024-01-16

## 2024-01-16 RX ORDER — ALBUTEROL SULFATE 90 UG/1
1-2 AEROSOL, METERED RESPIRATORY (INHALATION)
Status: CANCELLED
Start: 2024-01-30

## 2024-01-16 RX ORDER — MONTELUKAST SODIUM 10 MG/1
10 TABLET ORAL ONCE
Status: CANCELLED | OUTPATIENT
Start: 2024-01-30

## 2024-01-16 RX ORDER — METHYLPREDNISOLONE SODIUM SUCCINATE 125 MG/2ML
125 INJECTION, POWDER, LYOPHILIZED, FOR SOLUTION INTRAMUSCULAR; INTRAVENOUS
Status: CANCELLED
Start: 2024-01-16

## 2024-01-16 RX ORDER — ACETAMINOPHEN 325 MG/1
650 TABLET ORAL
Status: CANCELLED | OUTPATIENT
Start: 2024-01-16

## 2024-01-16 RX ORDER — DEXAMETHASONE 4 MG/1
12 TABLET ORAL ONCE
Status: CANCELLED | OUTPATIENT
Start: 2024-01-16

## 2024-01-16 RX ORDER — DIPHENHYDRAMINE HCL 50 MG
50 CAPSULE ORAL ONCE
Status: CANCELLED | OUTPATIENT
Start: 2024-01-30

## 2024-01-16 RX ORDER — EPINEPHRINE 1 MG/ML
0.3 INJECTION, SOLUTION, CONCENTRATE INTRAVENOUS EVERY 5 MIN PRN
Status: CANCELLED | OUTPATIENT
Start: 2024-01-30

## 2024-01-16 RX ORDER — EPINEPHRINE 1 MG/ML
0.3 INJECTION, SOLUTION, CONCENTRATE INTRAVENOUS EVERY 5 MIN PRN
Status: CANCELLED | OUTPATIENT
Start: 2024-01-16

## 2024-01-16 RX ORDER — ACETAMINOPHEN 325 MG/1
650 TABLET ORAL ONCE
Status: COMPLETED | OUTPATIENT
Start: 2024-01-16 | End: 2024-01-16

## 2024-01-16 RX ORDER — ALBUTEROL SULFATE 0.83 MG/ML
2.5 SOLUTION RESPIRATORY (INHALATION)
Status: CANCELLED | OUTPATIENT
Start: 2024-01-30

## 2024-01-16 RX ORDER — DEXAMETHASONE 4 MG/1
12 TABLET ORAL ONCE
Status: CANCELLED | OUTPATIENT
Start: 2024-01-30

## 2024-01-16 RX ORDER — DIPHENHYDRAMINE HCL 50 MG
50 CAPSULE ORAL ONCE
Status: COMPLETED | OUTPATIENT
Start: 2024-01-16 | End: 2024-01-16

## 2024-01-16 RX ADMIN — DEXAMETHASONE 12 MG: 4 TABLET ORAL at 11:45

## 2024-01-16 RX ADMIN — ACETAMINOPHEN 650 MG: 325 TABLET ORAL at 11:45

## 2024-01-16 RX ADMIN — Medication 50 MG: at 11:45

## 2024-01-16 RX ADMIN — DARATUMUMAB AND HYALURONIDASE-FIHJ (HUMAN RECOMBINANT) 1800 MG: 1800; 30000 INJECTION SUBCUTANEOUS at 12:21

## 2024-01-16 RX ADMIN — MONTELUKAST SODIUM 10 MG: 10 TABLET ORAL at 11:45

## 2024-01-16 ASSESSMENT — PAIN SCALES - GENERAL: PAINLEVEL: NO PAIN (0)

## 2024-01-16 NOTE — PROGRESS NOTES
Infusion Nursing Note:  Carmelita Watts presents today for Faspro.    Patient seen by provider today: Yes: Nida Gonzalez   present during visit today: Not Applicable.    Note: see provider assessment on this date for details.    Treatment Conditions:  Results reviewed, labs MET treatment parameters, ok to proceed with treatment.    Component      Latest Ref Rng 1/16/2024  10:37 AM   WBC      4.0 - 11.0 10e3/uL 6.0    RBC Count      3.80 - 5.20 10e6/uL 4.39    Hemoglobin      11.7 - 15.7 g/dL 13.7    Hematocrit      35.0 - 47.0 % 40.0    MCV      78 - 100 fL 91    MCH      26.5 - 33.0 pg 31.2    MCHC      31.5 - 36.5 g/dL 34.3    RDW      10.0 - 15.0 % 14.2    Platelet Count      150 - 450 10e3/uL 194    % Neutrophils      % 53    % Lymphocytes      % 33    % Monocytes      % 10    % Eosinophils      % 3    % Basophils      % 0    % Immature Granulocytes      % 1    NRBCs per 100 WBC      <1 /100 0    Absolute Neutrophils      1.6 - 8.3 10e3/uL 3.2    Absolute Lymphocytes      0.8 - 5.3 10e3/uL 2.0    Absolute Monocytes      0.0 - 1.3 10e3/uL 0.6    Absolute Eosinophils      0.0 - 0.7 10e3/uL 0.2    Absolute Basophils      0.0 - 0.2 10e3/uL 0.0    Absolute Immature Granulocytes      <=0.4 10e3/uL 0.0    Absolute NRBCs      10e3/uL 0.0      Post Infusion Assessment:  Patient tolerated injection without incident.         Discharge Plan: The following medication was given:     MEDICATION: Faspro  ROUTE: SQ  SITE: RUQ - Abd.  DOSE: 1800mg    Copy of AVS reviewed with patient and/or family.  Patient discharged in stable condition accompanied by: self.  Departure Mode: Ambulatory.      UZMA Mart

## 2024-01-16 NOTE — NURSING NOTE
"Oncology Rooming Note    January 16, 2024 10:23 AM   Carmelita Watts is a 73 year old female who presents for:    Chief Complaint   Patient presents with    Oncology Clinic Visit     Follow up Multiple Myeloma     Initial Vitals: /78   Pulse 84   Temp 97.8  F (36.6  C) (Tympanic)   Resp 20   Ht 1.6 m (5' 3\")   Wt 78.3 kg (172 lb 9.9 oz)   SpO2 95%   BMI 30.58 kg/m   Estimated body mass index is 30.58 kg/m  as calculated from the following:    Height as of this encounter: 1.6 m (5' 3\").    Weight as of this encounter: 78.3 kg (172 lb 9.9 oz). Body surface area is 1.87 meters squared.  No Pain (0) Comment: Data Unavailable   No LMP recorded. Patient is postmenopausal.  Allergies reviewed: Yes  Medications reviewed: Yes    Medications: Medication refills not needed today.  Pharmacy name entered into [facility]:    [id number] [place] - [address]  AT [facility] [address]  [facility] - [place], [place] - [address] [facility] - [place] - [address] [facility] [address] [facility] [address] [facility]    Frailty Screening:   Is the patient here for a new oncology consult visit in cancer care? 2. No          Vilma Chi LPN             "

## 2024-01-30 ENCOUNTER — INFUSION THERAPY VISIT (OUTPATIENT)
Dept: INFUSION THERAPY | Facility: OTHER | Age: 74
End: 2024-01-30
Attending: NURSE PRACTITIONER
Payer: MEDICARE

## 2024-01-30 VITALS
TEMPERATURE: 98.8 F | WEIGHT: 175.27 LBS | BODY MASS INDEX: 31.05 KG/M2 | SYSTOLIC BLOOD PRESSURE: 119 MMHG | HEIGHT: 63 IN | OXYGEN SATURATION: 97 % | DIASTOLIC BLOOD PRESSURE: 81 MMHG | HEART RATE: 73 BPM | RESPIRATION RATE: 16 BRPM

## 2024-01-30 DIAGNOSIS — C90.00 MULTIPLE MYELOMA NOT HAVING ACHIEVED REMISSION (H): Primary | ICD-10-CM

## 2024-01-30 LAB
BASOPHILS # BLD AUTO: 0 10E3/UL (ref 0–0.2)
BASOPHILS NFR BLD AUTO: 1 %
CALCIUM SERPL-MCNC: 9.3 MG/DL (ref 8.8–10.2)
CREAT SERPL-MCNC: 0.73 MG/DL (ref 0.51–0.95)
EGFRCR SERPLBLD CKD-EPI 2021: 85 ML/MIN/1.73M2
EOSINOPHIL # BLD AUTO: 0.2 10E3/UL (ref 0–0.7)
EOSINOPHIL NFR BLD AUTO: 4 %
ERYTHROCYTE [DISTWIDTH] IN BLOOD BY AUTOMATED COUNT: 14.3 % (ref 10–15)
HCT VFR BLD AUTO: 39.5 % (ref 35–47)
HGB BLD-MCNC: 13.6 G/DL (ref 11.7–15.7)
IMM GRANULOCYTES # BLD: 0 10E3/UL
IMM GRANULOCYTES NFR BLD: 1 %
LYMPHOCYTES # BLD AUTO: 2.1 10E3/UL (ref 0.8–5.3)
LYMPHOCYTES NFR BLD AUTO: 31 %
MCH RBC QN AUTO: 31.2 PG (ref 26.5–33)
MCHC RBC AUTO-ENTMCNC: 34.4 G/DL (ref 31.5–36.5)
MCV RBC AUTO: 91 FL (ref 78–100)
MONOCYTES # BLD AUTO: 0.7 10E3/UL (ref 0–1.3)
MONOCYTES NFR BLD AUTO: 10 %
NEUTROPHILS # BLD AUTO: 3.6 10E3/UL (ref 1.6–8.3)
NEUTROPHILS NFR BLD AUTO: 53 %
NRBC # BLD AUTO: 0 10E3/UL
NRBC BLD AUTO-RTO: 0 /100
PLATELET # BLD AUTO: 210 10E3/UL (ref 150–450)
RBC # BLD AUTO: 4.36 10E6/UL (ref 3.8–5.2)
WBC # BLD AUTO: 6.6 10E3/UL (ref 4–11)

## 2024-01-30 PROCEDURE — 96367 TX/PROPH/DG ADDL SEQ IV INF: CPT

## 2024-01-30 PROCEDURE — 85004 AUTOMATED DIFF WBC COUNT: CPT | Mod: ZL | Performed by: NURSE PRACTITIONER

## 2024-01-30 PROCEDURE — 96401 CHEMO ANTI-NEOPL SQ/IM: CPT

## 2024-01-30 PROCEDURE — 82565 ASSAY OF CREATININE: CPT | Mod: ZL | Performed by: NURSE PRACTITIONER

## 2024-01-30 PROCEDURE — 250N000011 HC RX IP 250 OP 636: Mod: JZ | Performed by: NURSE PRACTITIONER

## 2024-01-30 PROCEDURE — 82310 ASSAY OF CALCIUM: CPT | Mod: ZL | Performed by: NURSE PRACTITIONER

## 2024-01-30 PROCEDURE — 96365 THER/PROPH/DIAG IV INF INIT: CPT

## 2024-01-30 PROCEDURE — 36415 COLL VENOUS BLD VENIPUNCTURE: CPT | Mod: ZL | Performed by: NURSE PRACTITIONER

## 2024-01-30 PROCEDURE — 36591 DRAW BLOOD OFF VENOUS DEVICE: CPT | Mod: ZL | Performed by: NURSE PRACTITIONER

## 2024-01-30 RX ORDER — ZOLEDRONIC ACID 0.04 MG/ML
4 INJECTION, SOLUTION INTRAVENOUS ONCE
Status: CANCELLED | OUTPATIENT
Start: 2024-01-30 | End: 2024-01-30

## 2024-01-30 RX ORDER — ACETAMINOPHEN 325 MG/1
650 TABLET ORAL ONCE
Status: COMPLETED | OUTPATIENT
Start: 2024-01-30 | End: 2024-01-30

## 2024-01-30 RX ORDER — ZOLEDRONIC ACID 0.04 MG/ML
4 INJECTION, SOLUTION INTRAVENOUS ONCE
Status: COMPLETED | OUTPATIENT
Start: 2024-01-30 | End: 2024-01-30

## 2024-01-30 RX ORDER — MONTELUKAST SODIUM 10 MG/1
10 TABLET ORAL ONCE
Status: COMPLETED | OUTPATIENT
Start: 2024-01-30 | End: 2024-01-30

## 2024-01-30 RX ORDER — DEXAMETHASONE 4 MG/1
12 TABLET ORAL ONCE
Status: COMPLETED | OUTPATIENT
Start: 2024-01-30 | End: 2024-01-30

## 2024-01-30 RX ORDER — DIPHENHYDRAMINE HCL 50 MG
50 CAPSULE ORAL ONCE
Status: COMPLETED | OUTPATIENT
Start: 2024-01-30 | End: 2024-01-30

## 2024-01-30 RX ADMIN — DARATUMUMAB AND HYALURONIDASE-FIHJ (HUMAN RECOMBINANT) 1800 MG: 1800; 30000 INJECTION SUBCUTANEOUS at 10:52

## 2024-01-30 RX ADMIN — DEXAMETHASONE 12 MG: 4 TABLET ORAL at 10:30

## 2024-01-30 RX ADMIN — ZOLEDRONIC ACID 4 MG: 0.04 INJECTION, SOLUTION INTRAVENOUS at 10:54

## 2024-01-30 RX ADMIN — ACETAMINOPHEN 650 MG: 325 TABLET ORAL at 10:30

## 2024-01-30 RX ADMIN — MONTELUKAST SODIUM 10 MG: 10 TABLET ORAL at 10:30

## 2024-01-30 RX ADMIN — Medication 50 MG: at 10:30

## 2024-01-30 RX ADMIN — Medication 250 ML: at 10:54

## 2024-01-30 ASSESSMENT — PAIN SCALES - GENERAL: PAINLEVEL: NO PAIN (0)

## 2024-01-30 NOTE — PROGRESS NOTES
Infusion Nursing Note:  Carmelita Watts presents today for FASPRO / Zometa.    Patient seen by provider today: No   present during visit today: Not Applicable.    Note: N/A.      Intravenous Access:  Labs drawn without difficulty.  Peripheral IV placed.    Treatment Conditions:  Lab Results   Component Value Date    HGB 13.6 01/30/2024    WBC 6.6 01/30/2024    ANEU 1.0 (L) 01/03/2023    ANEUTAUTO 3.6 01/30/2024     01/30/2024        Lab Results   Component Value Date     01/09/2024    POTASSIUM 3.9 01/09/2024    CR 0.73 01/30/2024    PAYAL 9.3 01/30/2024    BILITOTAL 0.3 01/09/2024    ALBUMIN 4.4 01/09/2024    ALT 14 01/09/2024    AST 20 01/09/2024       Results reviewed, labs MET treatment parameters, ok to proceed with treatment.      Post Infusion Assessment:  Patient tolerated infusion without incident.  Patient tolerated injection without incident.  The following medication was given:     MEDICATION: FASPRO  ROUTE: SQ  SITE: LUQ - Abd      Site patent and intact, free from redness, edema or discomfort.  No evidence of extravasations.  Access discontinued per protocol.       Discharge Plan:   Patient discharged in stable condition accompanied by: self.  Departure Mode: Ambulatory.

## 2024-02-06 ENCOUNTER — LAB (OUTPATIENT)
Dept: LAB | Facility: OTHER | Age: 74
End: 2024-02-06
Payer: MEDICARE

## 2024-02-06 DIAGNOSIS — C90.00 MULTIPLE MYELOMA NOT HAVING ACHIEVED REMISSION (H): ICD-10-CM

## 2024-02-06 LAB
ALBUMIN SERPL BCG-MCNC: 4.2 G/DL (ref 3.5–5.2)
ALP SERPL-CCNC: 147 U/L (ref 40–150)
ALT SERPL W P-5'-P-CCNC: 15 U/L (ref 0–50)
ANION GAP SERPL CALCULATED.3IONS-SCNC: 12 MMOL/L (ref 7–15)
AST SERPL W P-5'-P-CCNC: 18 U/L (ref 0–45)
BASOPHILS # BLD AUTO: 0 10E3/UL (ref 0–0.2)
BASOPHILS NFR BLD AUTO: 0 %
BILIRUB SERPL-MCNC: 0.4 MG/DL
BUN SERPL-MCNC: 16.6 MG/DL (ref 8–23)
CALCIUM SERPL-MCNC: 9.3 MG/DL (ref 8.8–10.2)
CHLORIDE SERPL-SCNC: 100 MMOL/L (ref 98–107)
CREAT SERPL-MCNC: 0.76 MG/DL (ref 0.51–0.95)
DEPRECATED HCO3 PLAS-SCNC: 22 MMOL/L (ref 22–29)
EGFRCR SERPLBLD CKD-EPI 2021: 81 ML/MIN/1.73M2
EOSINOPHIL # BLD AUTO: 0.2 10E3/UL (ref 0–0.7)
EOSINOPHIL NFR BLD AUTO: 3 %
ERYTHROCYTE [DISTWIDTH] IN BLOOD BY AUTOMATED COUNT: 14.2 % (ref 10–15)
GLUCOSE SERPL-MCNC: 114 MG/DL (ref 70–99)
HCT VFR BLD AUTO: 40.6 % (ref 35–47)
HGB BLD-MCNC: 14 G/DL (ref 11.7–15.7)
IMM GRANULOCYTES # BLD: 0 10E3/UL
IMM GRANULOCYTES NFR BLD: 1 %
LYMPHOCYTES # BLD AUTO: 2.2 10E3/UL (ref 0.8–5.3)
LYMPHOCYTES NFR BLD AUTO: 32 %
MCH RBC QN AUTO: 31.3 PG (ref 26.5–33)
MCHC RBC AUTO-ENTMCNC: 34.5 G/DL (ref 31.5–36.5)
MCV RBC AUTO: 91 FL (ref 78–100)
MONOCYTES # BLD AUTO: 0.7 10E3/UL (ref 0–1.3)
MONOCYTES NFR BLD AUTO: 10 %
NEUTROPHILS # BLD AUTO: 3.7 10E3/UL (ref 1.6–8.3)
NEUTROPHILS NFR BLD AUTO: 54 %
NRBC # BLD AUTO: 0 10E3/UL
NRBC BLD AUTO-RTO: 0 /100
PLATELET # BLD AUTO: 226 10E3/UL (ref 150–450)
POTASSIUM SERPL-SCNC: 4.1 MMOL/L (ref 3.4–5.3)
PROT SERPL-MCNC: 6.9 G/DL (ref 6.4–8.3)
RBC # BLD AUTO: 4.47 10E6/UL (ref 3.8–5.2)
SODIUM SERPL-SCNC: 134 MMOL/L (ref 135–145)
WBC # BLD AUTO: 6.9 10E3/UL (ref 4–11)

## 2024-02-06 PROCEDURE — 84165 PROTEIN E-PHORESIS SERUM: CPT | Mod: 26 | Performed by: PATHOLOGY

## 2024-02-06 PROCEDURE — 36415 COLL VENOUS BLD VENIPUNCTURE: CPT | Mod: ZL

## 2024-02-06 PROCEDURE — 86334 IMMUNOFIX E-PHORESIS SERUM: CPT | Mod: 26 | Performed by: PATHOLOGY

## 2024-02-06 PROCEDURE — 85810 BLOOD VISCOSITY EXAMINATION: CPT | Mod: ZL

## 2024-02-06 PROCEDURE — 84165 PROTEIN E-PHORESIS SERUM: CPT | Mod: ZL | Performed by: STUDENT IN AN ORGANIZED HEALTH CARE EDUCATION/TRAINING PROGRAM

## 2024-02-06 PROCEDURE — 82040 ASSAY OF SERUM ALBUMIN: CPT | Mod: ZL

## 2024-02-06 PROCEDURE — 86334 IMMUNOFIX E-PHORESIS SERUM: CPT | Mod: ZL | Performed by: STUDENT IN AN ORGANIZED HEALTH CARE EDUCATION/TRAINING PROGRAM

## 2024-02-06 PROCEDURE — 83521 IG LIGHT CHAINS FREE EACH: CPT | Mod: ZL

## 2024-02-06 PROCEDURE — 85025 COMPLETE CBC W/AUTO DIFF WBC: CPT | Mod: ZL

## 2024-02-06 PROCEDURE — 84155 ASSAY OF PROTEIN SERUM: CPT | Mod: ZL

## 2024-02-06 PROCEDURE — 82784 ASSAY IGA/IGD/IGG/IGM EACH: CPT | Mod: ZL

## 2024-02-06 PROCEDURE — 82232 ASSAY OF BETA-2 PROTEIN: CPT | Mod: ZL

## 2024-02-06 NOTE — PROGRESS NOTES
Oncology Follow-up Visit    Reason for Visit:  Carmelita is a 73 year old woman with a diagnosis of multiple myeloma, who I am visiting with today for routine follow-up and consideration of ongoing therapy.     Nursing Note and documentation reviewed: Yes    Interval History:   Carlene is doing well. No concerns today. No recent signs of infection or bleeding concerns. No nausea or vomiting or appetite concerns. Working on weight loss. No bowel concerns. Energy levels are pretty good. Feels that she is seeing positive changes in mental health, staying active and busy.     Oncologic History  12/31/2018 Presented to the emergency room with vertigo and fatigue.  CT scan of the head was negative and subsequent stress test was negative.  05/03/2019 PCP ordered lab work and noted a total protein of 12.9.  SPEP at that time showed an M spike of 6.2 with a large monoclonal protein seen in the gamma fraction. Urine immunofixation showed a possible small protein band in the gamma fraction  05/31/2019 Evaluated by Dr. Walker with Medical Oncology with plan to rule out myeloma and obtain bone marrow aspiration biopsy as well as a metastatic bone survey along with additional labwork  06/18/2019 Underwent bone marrow aspiration and biopsy  06/24/2019 Seen again by Dr. Walker and CBC showed a hemoglobin of 9.3, M spike 7.3 with monoclonal IgG immunoglobulin of kappa light chain type; serum viscosity was 2.9; quantitative immunoglobulins showed an IgG of 8160, beta-2 microglobulin was 5.8, BUN was 21 with creatinine is 0.8 and total protein was 13.7.  Quantitative kappa/lambda free light chains showed an elevated ratio of 17.0 bone marrow aspiration biopsy showed plasma cell myeloma with approximately 80% plasma cells.  Immunofixation showed IgG kappa and flow cytometry revealed kappa monotypic plasma cells consistent with clonal plasma cell neoplasm and FISH panel was pending at that time. It was felt she had at least stage II  disease based on her beta-2 microglobulin and anemia. Plan was to treat with Velcade 1.3 mg per/m2 days 1, 4, 8 and 11/Decadron 40 mg on days 1, 8 and 15. Initiation of Revlimid with C2 at 25 mg daily days 1 through 14.  Plan was to also obtain an MRI of the lumbar spine to rule out lytic lesion at L3.  She was initiated on Zovirax 400 mg p.o. twice daily.  06/25/2019 C1 of chemotherapy initiated  07/01/2019 Note in chart regarding patient's large co-pay for the Revlimid and no plan at this point to initiate Revlimid and treat only with Velcade and Decadron per Dr. Walker  07/11/2019 MRI lumbar spine shows a pathologic superior endplate compression fracture at L3 without evidence of retropulsed fragment and innumerable enhancing lesions throughout the lumbar spine consistent with history of multiple myeloma.  09/10/2019 Increasing M-spike and kappa lambda ratio  10/01/2019 Initiation of CyBorD  11/17/2020 Plateau of M-spike at 1.2 after 36 cycles of CyBorD  12/01/2020 Initiation of Darzalex Faspro injection  03/23/2021 M-spike increased form 1.0 to 1.2 and velcade and dexamethasone added to plan  04/12/2022 Treatment HOLIDAY commenced with Mspike 0.3 after 22 cycles of treatent  05/27/2022 Received Evusheld while on treatment holiday.   07/18/2023 Mspike 0.8. Other labs stable. PET scan ordered.   08/02/2023 PET completed showing negative study. No evidence of mass or lymphadenopathy. No concerning hypermetabolism is present.  08/14/2023 Met with Dr. Walker. Given biochemical progression, he did feel appropriate to resume therapy again with Darzalex faspro    Current Chemo Regime/TX: Darzalex faspro injection 1800mg subcutaneous per protocol  Current Cycle: 7  Completed cycles: 6  **C1/C2 Days 1, 8, 15, 22  **C3/C4/C5/C6 Days 1, 15  *C7 and so forth...D1     Previous treatment:   Velcade 1.3 mg/m2 days 1, 4, 8 and 11 with Decadron 40 mg days 1, 8 and 15 x 4 cycles;   Velcade 1.5mg.m2/cyclophosphamide 150mg every  "7 days on days 1,8,15 and 22/decadron 40mg days 1,8,15,22 ; Darzalex faspro injection 1800mg subcutaneous per protocol    Past Medical History:   Diagnosis Date    Arthritis     Cyst of breast     Depressive disorder     Diabetes mellitus, type 2 (H) 1/18/2021    Essential hypertension 10/1/2015    Major depressive disorder, recurrent episode, mild (H24) 10/1/2015    Mixed hyperlipidemia 10/1/2015    Multiple myeloma not having achieved remission (H) 6/24/2019    Other specified disorders of bladder 07/09/2012    Irritable Bladder    Seasonal allergies 10/1/2015    Unspecified essential hypertension 03/19/2007    Unspecified sinusitis (chronic) 09/05/2007       Past Surgical History:   Procedure Laterality Date    APPENDECTOMY      BONE MARROW BIOPSY, BONE SPECIMEN, NEEDLE/TROCAR N/A 6/18/2019    Procedure: BONE MARROW BIOPSY;  Surgeon: Maciej Sanz MD;  Location: [facility] OR    CHOLECYSTECTOMY      COLONOSCOPY  07-    repeat 10 years    COLONOSCOPY N/A 12/30/2016    Procedure: COLONOSCOPY;  Surgeon: Bhaskar Franklin DO;  Location: [facility] OR    PUNC/ASPIR BREAST CYST Left 1995    benign    SINUS SURGERY      TUBAL STERILIZATION         Family History   Problem Relation Age of Onset    Breast Cancer Mother 60        Cause of Death    Parkinsonism Father         \"Possible\"    Coronary Artery Disease Father     Pacemaker Father     Thyroid Disease Daughter     Breast Cancer Maternal Aunt         over 50    Diabetes No family hx of     Hypertension No family hx of     Hyperlipidemia No family hx of     Cerebrovascular Disease No family hx of     Colon Cancer No family hx of     Prostate Cancer No family hx of     Genetic Disorder No family hx of     Asthma No family hx of     Anesthesia Reaction No family hx of        Social History     Socioeconomic History    Marital status:      Spouse name: Not on file    Number of children: Not on file    Years of education: Not on file    Highest education level: Not " on file   Occupational History    Occupation: Financial     Comment:  - ([employer])   Tobacco Use    Smoking status: Never    Smokeless tobacco: Never    Tobacco comments:     Tried to Quit (YES); QUIT in 1971; Passive Exposure (NO)   Vaping Use    Vaping Use: Never used   Substance and Sexual Activity    Alcohol use: Yes     Comment: RARELY    Drug use: No    Sexual activity: Yes     Partners: Male     Birth control/protection: None   Other Topics Concern     Service Not Asked    Blood Transfusions Not Asked    Caffeine Concern Yes     Comment: Coffee >6 cups daily    Occupational Exposure Not Asked    Hobby Hazards Not Asked    Sleep Concern Not Asked    Stress Concern Not Asked    Weight Concern Not Asked    Special Diet Not Asked    Back Care Not Asked    Exercise Not Asked    Bike Helmet Not Asked    Seat Belt Not Asked    Self-Exams Not Asked    Parent/sibling w/ CABG, MI or angioplasty before 65F 55M? No   Social History Narrative    Not on file     Social Determinants of Health     Financial Resource Strain: Low Risk  (11/27/2023)    Financial Resource Strain     Within the past 12 months, have you or your family members you live with been unable to get utilities (heat, electricity) when it was really needed?: No   Food Insecurity: Low Risk  (11/27/2023)    Food Insecurity     Within the past 12 months, did you worry that your food would run out before you got money to buy more?: No     Within the past 12 months, did the food you bought just not last and you didn t have money to get more?: No   Transportation Needs: Low Risk  (11/27/2023)    Transportation Needs     Within the past 12 months, has lack of transportation kept you from medical appointments, getting your medicines, non-medical meetings or appointments, work, or from getting things that you need?: No   Physical Activity: Not on file   Stress: Not on file   Social Connections: Not on file   Interpersonal Safety: Low Risk   "(11/27/2023)    Interpersonal Safety     Do you feel physically and emotionally safe where you currently live?: Yes     Within the past 12 months, have you been hit, slapped, kicked or otherwise physically hurt by someone?: No     Within the past 12 months, have you been humiliated or emotionally abused in other ways by your partner or ex-partner?: No   Housing Stability: Low Risk  (11/27/2023)    Housing Stability     Do you have housing? : Yes     Are you worried about losing your housing?: No       Current Outpatient Medications   Medication    acyclovir (ZOVIRAX) 400 MG tablet    aspirin (ASA) 81 MG chewable tablet    atorvastatin (LIPITOR) 20 MG tablet    diphenhydrAMINE (BENADRYL) 25 MG capsule    fluticasone (FLONASE) 50 MCG/ACT nasal spray    L-Lysine HCl 500 MG CAPS    losartan (COZAAR) 50 MG tablet    sertraline (ZOLOFT) 50 MG tablet     No current facility-administered medications for this visit.        Allergies   Allergen Reactions    Lisinopril Cough    Phenylephrine Hcl Other (See Comments)     **Entex  HEADACHE (SEVERE)    Phenylpropanolamine Other (See Comments)     **Entex  HEADACHE (SEVERE)    Pseudoephedrine Tannate Other (See Comments)     **Entex  HEADACHE (SEVERE)    Levofloxacin Rash     **Levaquin    Moxifloxacin Hcl [Moxifloxacin Hcl In Nacl] Rash       Review Of Systems:  A complete review of systems is negative except for the above mentioned items in the interval history.     Physical Exam:  /64 (BP Location: Right arm, Patient Position: Sitting, Cuff Size: Adult Regular)   Pulse 82   Temp 98.2  F (36.8  C) (Tympanic)   Ht 1.6 m (5' 3\")   Wt 77.4 kg (170 lb 10.2 oz)   SpO2 94%   BMI 30.23 kg/m    GENERAL APPEARANCE: Healthy, alert and in no acute distress.  HEENT: Eyes appear normal without scleral icterus. Extraocular movements intact.   NECK:   Supple with normal range of motion. No asymmetry or masses.  LYMPHATICS: No palpable cervical, supraclavicular nodes.  RESP: Lungs " clear to auscultation bilaterally, respirations regular and easy.  CARDIOVASCULAR: Regular rate and rhythm. Normal S1, S2; no murmur, gallop, or rub.  ABDOMEN: Soft, non-tender, non-distended. No palpable organomegaly or masses.  MUSCULOSKELETAL: Extremities without gross deformities noted. No edema of bilateral lower extremities.  SKIN: No suspicious lesions or rashes.  NEURO: Alert and oriented x 3.  Gait steady.  PSYCHIATRIC: Mentation and affect appear normal.  Mood appropriate.    Laboratory:  Component      Latest Ref Rng 2/6/2024  9:53 AM   Sodium      135 - 145 mmol/L 134 (L)    Potassium      3.4 - 5.3 mmol/L 4.1    Carbon Dioxide (CO2)      22 - 29 mmol/L 22    Anion Gap      7 - 15 mmol/L 12    Urea Nitrogen      8.0 - 23.0 mg/dL 16.6    Creatinine      0.51 - 0.95 mg/dL 0.76    GFR Estimate      >60 mL/min/1.73m2 81    Calcium      8.8 - 10.2 mg/dL 9.3    Chloride      98 - 107 mmol/L 100    Glucose      70 - 99 mg/dL 114 (H)    Alkaline Phosphatase      40 - 150 U/L 147    AST      0 - 45 U/L 18    ALT      0 - 50 U/L 15    Protein Total      6.4 - 8.3 g/dL 6.9    Albumin      3.5 - 5.2 g/dL 4.2    Bilirubin Total      <=1.2 mg/dL 0.4    Albumin Fraction      3.7 - 5.1 g/dL 4.0    Alpha 1 Fraction      0.2 - 0.4 g/dL 0.3    Alpha 2 Fraction      0.5 - 0.9 g/dL 0.8    Beta Fraction      0.6 - 1.0 g/dL 0.7    Gamma Fraction      0.7 - 1.6 g/dL 1.0    Monoclonal Peak      <=0.0 g/dL 0.2 (H)    ELP Interpretation: Monoclonal protein (0.2 g/dL) seen in the gamma fraction. See immunofixation report on same specimen. Pathologic significance requires clinical correlation. Clair Lyons M.D., Ph.D.    IGG      610 - 1,616 mg/dL 1,120    IGA      84 - 499 mg/dL 14 (L)    IGM      35 - 242 mg/dL 22 (L)    [place] Free Lt Chain      0.33 - 1.94 mg/dL 0.53    Lambda Free Lt Chain      0.57 - 2.63 mg/dL 0.26 (L)    Kappa Lambda Ratio      0.26 - 1.65  2.04 (H)    Beta-2-Microglobulin      <2.3 mg/L 2.0    Viscosity  Index      1.4 - 1.8  1.5    Immunofixation ELP Monoclonal IgG immunoglobulin of kappa light chain type. Monoclonal antibody therapeutics (e.g. Daratumumab) may appear as monoclonal proteins on serum electrophoresis and immunofixation. Results should be interpreted with caution. Pathologic significance requires clinical correlation. Clair Lyons M.D., Ph.D.    Total Protein Serum for ELP      6.4 - 8.3 g/dL 6.8       Legend:  (L) Low  (H) High  Results for orders placed or performed in visit on 02/13/24   CBC with platelets and differential     Status: None   Result Value Ref Range    WBC Count 6.9 4.0 - 11.0 10e3/uL    RBC Count 4.50 3.80 - 5.20 10e6/uL    Hemoglobin 14.0 11.7 - 15.7 g/dL    Hematocrit 41.1 35.0 - 47.0 %    MCV 91 78 - 100 fL    MCH 31.1 26.5 - 33.0 pg    MCHC 34.1 31.5 - 36.5 g/dL    RDW 14.0 10.0 - 15.0 %    Platelet Count 225 150 - 450 10e3/uL    % Neutrophils 54 %    % Lymphocytes 32 %    % Monocytes 9 %    % Eosinophils 4 %    % Basophils 0 %    % Immature Granulocytes 1 %    NRBCs per 100 WBC 0 <1 /100    Absolute Neutrophils 3.7 1.6 - 8.3 10e3/uL    Absolute Lymphocytes 2.2 0.8 - 5.3 10e3/uL    Absolute Monocytes 0.6 0.0 - 1.3 10e3/uL    Absolute Eosinophils 0.3 0.0 - 0.7 10e3/uL    Absolute Basophils 0.0 0.0 - 0.2 10e3/uL    Absolute Immature Granulocytes 0.0 <=0.4 10e3/uL    Absolute NRBCs 0.0 10e3/uL   CBC with platelets differential     Status: None    Narrative    The following orders were created for panel order CBC with platelets differential.  Procedure                               Abnormality         Status                     ---------                               -----------         ------                     CBC with platelets and d...[[id number]]                      Final result                 Please view results for these tests on the individual orders.       Imaging Studies:  None this visit.     ASSESSMENT/PLAN:  #1 Multiple myeloma IgG kappa multiple myeloma with 80%  involvement of the bone marrow diagnosed June 2019 initially treated with Velcade and Decadron and received 4 cycles with increasing M-spike and kappa/lambda ratio.  Treatment changed to CyBorD on 10/1/2019.  M-spike plateau at 1.2 and treatment changed to monotherapy with Darzalex Faspro injection. M spike declined to 1.0 then increased again to 1.2 so Velcade and Dex added to her treatment plan with cycle 5 therapy.     Following C22 (April 2022), she did initiate a treatment holiday when her Mspike was 0.3. We had followed her counts for over a year. Unfortunately, most recently, she did see a rise in her mspike to 0.8. PET completed was negative. Dr. Walker felt this was a biochemical progression and after discussion with patient, elected to resume treatment with Darzalex faspro.     Today, she is due for C7 treatment, now commencing monthly treatment. She is doing well, tolerating therapy without particular concern. Labs are very stable. Will continue. I will see her back in 4 weeks with labs completed the week prior (myeloma labs). I will check CBC the day I see her.     #2 Lytic lesions She remains on calcium with vitamin D twice a day. Weight bearing activity. She had her dental exam and received clearance for bisphosphonate. Resumed Jan 2024. Will be due April.       Patient in agreement with plan and verbalizes understanding. Agrees to call with any questions or concerns.      33 minutes spent in the patient's encounter today with time spent in review of patient's chart along with chart preparation and review of the treatment plan and signing of treatment plan.  Time was also spent with the patient in obtaining a review of systems and performing a physical exam along with detailed review of all test results. Time was also spent in discussing plan for future follow-up and relating instructions for follow-up and in placing future orders.      FLO Duque, VAMSIP-C

## 2024-02-07 LAB
ALBUMIN SERPL ELPH-MCNC: 4 G/DL (ref 3.7–5.1)
ALPHA1 GLOB SERPL ELPH-MCNC: 0.3 G/DL (ref 0.2–0.4)
ALPHA2 GLOB SERPL ELPH-MCNC: 0.8 G/DL (ref 0.5–0.9)
B-GLOBULIN SERPL ELPH-MCNC: 0.7 G/DL (ref 0.6–1)
B2 MICROGLOB TUMOR MARKER SER-MCNC: 2 MG/L
GAMMA GLOB SERPL ELPH-MCNC: 1 G/DL (ref 0.7–1.6)
IGA SERPL-MCNC: 14 MG/DL (ref 84–499)
IGG SERPL-MCNC: 1120 MG/DL (ref 610–1616)
IGM SERPL-MCNC: 22 MG/DL (ref 35–242)
KAPPA LC FREE SER-MCNC: 0.53 MG/DL (ref 0.33–1.94)
KAPPA LC FREE/LAMBDA FREE SER NEPH: 2.04 {RATIO} (ref 0.26–1.65)
LAMBDA LC FREE SERPL-MCNC: 0.26 MG/DL (ref 0.57–2.63)
M PROTEIN SERPL ELPH-MCNC: 0.2 G/DL
PROT PATTERN SERPL ELPH-IMP: ABNORMAL
PROT PATTERN SERPL IFE-IMP: NORMAL
TOTAL PROTEIN SERUM FOR ELP: 6.8 G/DL (ref 6.4–8.3)
VISC SER: 1.5 CP (ref 1.4–1.8)

## 2024-02-13 ENCOUNTER — LAB (OUTPATIENT)
Dept: ONCOLOGY | Facility: OTHER | Age: 74
End: 2024-02-13
Attending: NURSE PRACTITIONER
Payer: MEDICARE

## 2024-02-13 ENCOUNTER — INFUSION THERAPY VISIT (OUTPATIENT)
Dept: INFUSION THERAPY | Facility: OTHER | Age: 74
End: 2024-02-13
Attending: NURSE PRACTITIONER
Payer: MEDICARE

## 2024-02-13 VITALS
DIASTOLIC BLOOD PRESSURE: 64 MMHG | HEIGHT: 63 IN | BODY MASS INDEX: 30.23 KG/M2 | HEART RATE: 82 BPM | OXYGEN SATURATION: 94 % | WEIGHT: 170.64 LBS | TEMPERATURE: 98.2 F | SYSTOLIC BLOOD PRESSURE: 122 MMHG

## 2024-02-13 DIAGNOSIS — C90.00 MULTIPLE MYELOMA NOT HAVING ACHIEVED REMISSION (H): Primary | ICD-10-CM

## 2024-02-13 LAB
BASOPHILS # BLD AUTO: 0 10E3/UL (ref 0–0.2)
BASOPHILS NFR BLD AUTO: 0 %
EOSINOPHIL # BLD AUTO: 0.3 10E3/UL (ref 0–0.7)
EOSINOPHIL NFR BLD AUTO: 4 %
ERYTHROCYTE [DISTWIDTH] IN BLOOD BY AUTOMATED COUNT: 14 % (ref 10–15)
HCT VFR BLD AUTO: 41.1 % (ref 35–47)
HGB BLD-MCNC: 14 G/DL (ref 11.7–15.7)
IMM GRANULOCYTES # BLD: 0 10E3/UL
IMM GRANULOCYTES NFR BLD: 1 %
LYMPHOCYTES # BLD AUTO: 2.2 10E3/UL (ref 0.8–5.3)
LYMPHOCYTES NFR BLD AUTO: 32 %
MCH RBC QN AUTO: 31.1 PG (ref 26.5–33)
MCHC RBC AUTO-ENTMCNC: 34.1 G/DL (ref 31.5–36.5)
MCV RBC AUTO: 91 FL (ref 78–100)
MONOCYTES # BLD AUTO: 0.6 10E3/UL (ref 0–1.3)
MONOCYTES NFR BLD AUTO: 9 %
NEUTROPHILS # BLD AUTO: 3.7 10E3/UL (ref 1.6–8.3)
NEUTROPHILS NFR BLD AUTO: 54 %
NRBC # BLD AUTO: 0 10E3/UL
NRBC BLD AUTO-RTO: 0 /100
PLATELET # BLD AUTO: 225 10E3/UL (ref 150–450)
RBC # BLD AUTO: 4.5 10E6/UL (ref 3.8–5.2)
WBC # BLD AUTO: 6.9 10E3/UL (ref 4–11)

## 2024-02-13 PROCEDURE — G0463 HOSPITAL OUTPT CLINIC VISIT: HCPCS

## 2024-02-13 PROCEDURE — 36415 COLL VENOUS BLD VENIPUNCTURE: CPT | Mod: ZL | Performed by: NURSE PRACTITIONER

## 2024-02-13 PROCEDURE — 96402 CHEMO HORMON ANTINEOPL SQ/IM: CPT

## 2024-02-13 PROCEDURE — 250N000011 HC RX IP 250 OP 636: Mod: JZ | Performed by: NURSE PRACTITIONER

## 2024-02-13 PROCEDURE — 99214 OFFICE O/P EST MOD 30 MIN: CPT | Performed by: NURSE PRACTITIONER

## 2024-02-13 PROCEDURE — G0463 HOSPITAL OUTPT CLINIC VISIT: HCPCS | Mod: 25

## 2024-02-13 PROCEDURE — 85004 AUTOMATED DIFF WBC COUNT: CPT | Mod: ZL | Performed by: NURSE PRACTITIONER

## 2024-02-13 RX ORDER — DEXAMETHASONE 4 MG/1
12 TABLET ORAL
Status: CANCELLED | OUTPATIENT
Start: 2024-02-13

## 2024-02-13 RX ORDER — METHYLPREDNISOLONE SODIUM SUCCINATE 125 MG/2ML
125 INJECTION, POWDER, LYOPHILIZED, FOR SOLUTION INTRAMUSCULAR; INTRAVENOUS
Status: CANCELLED
Start: 2024-02-13

## 2024-02-13 RX ORDER — DEXAMETHASONE 4 MG/1
12 TABLET ORAL ONCE
Status: COMPLETED | OUTPATIENT
Start: 2024-02-13 | End: 2024-02-13

## 2024-02-13 RX ORDER — ACETAMINOPHEN 325 MG/1
650 TABLET ORAL ONCE
Status: COMPLETED | OUTPATIENT
Start: 2024-02-13 | End: 2024-02-13

## 2024-02-13 RX ORDER — DIPHENHYDRAMINE HCL 50 MG
50 CAPSULE ORAL
Status: CANCELLED | OUTPATIENT
Start: 2024-02-13

## 2024-02-13 RX ORDER — ALBUTEROL SULFATE 90 UG/1
1-2 AEROSOL, METERED RESPIRATORY (INHALATION)
Status: CANCELLED
Start: 2024-02-13

## 2024-02-13 RX ORDER — ACETAMINOPHEN 325 MG/1
650 TABLET ORAL
Status: CANCELLED | OUTPATIENT
Start: 2024-02-13

## 2024-02-13 RX ORDER — MEPERIDINE HYDROCHLORIDE 25 MG/ML
25 INJECTION INTRAMUSCULAR; INTRAVENOUS; SUBCUTANEOUS EVERY 30 MIN PRN
Status: CANCELLED | OUTPATIENT
Start: 2024-02-13

## 2024-02-13 RX ORDER — LORAZEPAM 2 MG/ML
0.5 INJECTION INTRAMUSCULAR EVERY 4 HOURS PRN
Status: CANCELLED | OUTPATIENT
Start: 2024-02-13

## 2024-02-13 RX ORDER — DIPHENHYDRAMINE HCL 50 MG
50 CAPSULE ORAL ONCE
Status: COMPLETED | OUTPATIENT
Start: 2024-02-13 | End: 2024-02-13

## 2024-02-13 RX ORDER — HEPARIN SODIUM (PORCINE) LOCK FLUSH IV SOLN 100 UNIT/ML 100 UNIT/ML
5 SOLUTION INTRAVENOUS
Status: CANCELLED | OUTPATIENT
Start: 2024-02-13

## 2024-02-13 RX ORDER — HEPARIN SODIUM,PORCINE 10 UNIT/ML
5-20 VIAL (ML) INTRAVENOUS DAILY PRN
Status: CANCELLED | OUTPATIENT
Start: 2024-02-13

## 2024-02-13 RX ORDER — ALBUTEROL SULFATE 0.83 MG/ML
2.5 SOLUTION RESPIRATORY (INHALATION)
Status: CANCELLED | OUTPATIENT
Start: 2024-02-13

## 2024-02-13 RX ORDER — EPINEPHRINE 1 MG/ML
0.3 INJECTION, SOLUTION, CONCENTRATE INTRAVENOUS EVERY 5 MIN PRN
Status: CANCELLED | OUTPATIENT
Start: 2024-02-13

## 2024-02-13 RX ORDER — DIPHENHYDRAMINE HYDROCHLORIDE 50 MG/ML
50 INJECTION INTRAMUSCULAR; INTRAVENOUS
Status: CANCELLED
Start: 2024-02-13

## 2024-02-13 RX ORDER — MONTELUKAST SODIUM 10 MG/1
10 TABLET ORAL ONCE
Status: COMPLETED | OUTPATIENT
Start: 2024-02-13 | End: 2024-02-13

## 2024-02-13 RX ADMIN — Medication 50 MG: at 10:36

## 2024-02-13 RX ADMIN — ACETAMINOPHEN 650 MG: 325 TABLET ORAL at 10:36

## 2024-02-13 RX ADMIN — DEXAMETHASONE 12 MG: 4 TABLET ORAL at 10:36

## 2024-02-13 RX ADMIN — MONTELUKAST SODIUM 10 MG: 10 TABLET ORAL at 10:37

## 2024-02-13 RX ADMIN — DARATUMUMAB AND HYALURONIDASE-FIHJ (HUMAN RECOMBINANT) 1800 MG: 1800; 30000 INJECTION SUBCUTANEOUS at 11:13

## 2024-02-13 ASSESSMENT — PAIN SCALES - GENERAL: PAINLEVEL: NO PAIN (0)

## 2024-02-13 NOTE — PROGRESS NOTES
24 gauge angio cath inserted into left AC.  Immediate blood return noted. Wasted 3mL blood, ordered labs taken. Flushed with 3mL normal saline, flushes easily without resistance, no signs or symptoms of infiltration or infection. IV secured with sterile, transparent dressing and tape.  Patient tolerated well, denies pain or discomfort at this time. Patient discharged ambulatory.

## 2024-02-13 NOTE — NURSING NOTE
"Oncology Rooming Note    February 13, 2024 9:58 AM   Carmelita Watts is a 73 year old female who presents for:    Chief Complaint   Patient presents with    Oncology Clinic Visit     Follow up. Multiple myeloma not having achieved remission      Initial Vitals: /64 (BP Location: Right arm, Patient Position: Sitting, Cuff Size: Adult Regular)   Pulse 82   Temp 98.2  F (36.8  C) (Tympanic)   Ht 1.6 m (5' 3\")   Wt 77.4 kg (170 lb 10.2 oz)   SpO2 94%   BMI 30.23 kg/m   Estimated body mass index is 30.23 kg/m  as calculated from the following:    Height as of this encounter: 1.6 m (5' 3\").    Weight as of this encounter: 77.4 kg (170 lb 10.2 oz). Body surface area is 1.85 meters squared.  No Pain (0) Comment: Data Unavailable   No LMP recorded. Patient is postmenopausal.  Allergies reviewed: Yes  Medications reviewed: Yes    Medications: Medication refills not needed today.  Pharmacy name entered into [facility]:    [id number] - [place], [place] - [address] AT [facility] [address]  [address]  [facility] - [place], [place] - [address] [facility] [place] - [address] [address] [facility]    Frailty Screening:   Is the patient here for a new oncology consult visit in cancer care? 2. No      Clinical concerns: none       Julia Andrade LPN              "

## 2024-02-13 NOTE — PROGRESS NOTES
Infusion Nursing Note:  Carmelita Watts presents today for Faspro.    Patient seen by provider today: Yes: Nida Gonzalez   present during visit today: Not Applicable.    Note: see provider assessment on this date for details.    Treatment Conditions:  Results reviewed, labs MET treatment parameters, ok to proceed with treatment.    Component      Latest Ref Rng 2/13/2024  9:07 AM   WBC      4.0 - 11.0 10e3/uL 6.9    RBC Count      3.80 - 5.20 10e6/uL 4.50    Hemoglobin      11.7 - 15.7 g/dL 14.0    Hematocrit      35.0 - 47.0 % 41.1    MCV      78 - 100 fL 91    MCH      26.5 - 33.0 pg 31.1    MCHC      31.5 - 36.5 g/dL 34.1    RDW      10.0 - 15.0 % 14.0    Platelet Count      150 - 450 10e3/uL 225    % Neutrophils      % 54    % Lymphocytes      % 32    % Monocytes      % 9    % Eosinophils      % 4    % Basophils      % 0    % Immature Granulocytes      % 1    NRBCs per 100 WBC      <1 /100 0    Absolute Neutrophils      1.6 - 8.3 10e3/uL 3.7    Absolute Lymphocytes      0.8 - 5.3 10e3/uL 2.2    Absolute Monocytes      0.0 - 1.3 10e3/uL 0.6    Absolute Eosinophils      0.0 - 0.7 10e3/uL 0.3    Absolute Basophils      0.0 - 0.2 10e3/uL 0.0    Absolute Immature Granulocytes      <=0.4 10e3/uL 0.0    Absolute NRBCs      10e3/uL 0.0          Post Infusion Assessment:  Patient tolerated injection without incident.     The following medication was given:     MEDICATION: Faspro  ROUTE: SQ  SITE: RUQ - Abd.  DOSE: 1800mg    Discharge Plan:   Copy of AVS reviewed with patient and/or family.    Patient discharged in stable condition accompanied by: self.  Departure Mode: Ambulatory.

## 2024-02-18 ENCOUNTER — HEALTH MAINTENANCE LETTER (OUTPATIENT)
Age: 74
End: 2024-02-18

## 2024-02-24 NOTE — PATIENT INSTRUCTIONS
Flu shot today.    We would like to see you back per your schedule.     When you are in need of a refill, please call your pharmacy and they will send us a request.     If you have any questions please call [phone]    Other instructions:  none     None

## 2024-03-04 ENCOUNTER — ALLIED HEALTH/NURSE VISIT (OUTPATIENT)
Dept: AUDIOLOGY | Facility: OTHER | Age: 74
End: 2024-03-04
Attending: AUDIOLOGIST
Payer: MEDICARE

## 2024-03-04 DIAGNOSIS — H91.93 DECREASED HEARING OF BOTH EARS: Primary | ICD-10-CM

## 2024-03-04 PROCEDURE — V5299 HEARING SERVICE: HCPCS

## 2024-03-04 NOTE — PROGRESS NOTES
BACKGROUND:  Carmelita Watts, a 73 year old female, was seen today to request to send in aid(s) for reconditioning prior to warranty expiration.  Ms. Watts wears Binaural O[employer]on More 2 [employer] hearing aids.     RESULTS:  Hearing aid(s) was sent in to  for preventative maintenance. Warranty reviewed with patient.      RECOMMENDATIONS:  Based on patient report, no audiology appointment for adjustments needed.Ms. Watts will be contacted to  hearing aid(s). Patient had no further questions or concerns.     Nila Arteaga  Audiology Assistant  [facility]  [phone]

## 2024-03-05 ENCOUNTER — LAB (OUTPATIENT)
Dept: ONCOLOGY | Facility: OTHER | Age: 74
End: 2024-03-05
Attending: NURSE PRACTITIONER
Payer: MEDICARE

## 2024-03-05 DIAGNOSIS — C90.00 MULTIPLE MYELOMA NOT HAVING ACHIEVED REMISSION (H): ICD-10-CM

## 2024-03-05 LAB
ALBUMIN SERPL BCG-MCNC: 4.2 G/DL (ref 3.5–5.2)
ALP SERPL-CCNC: 147 U/L (ref 40–150)
ALT SERPL W P-5'-P-CCNC: 13 U/L (ref 0–50)
ANION GAP SERPL CALCULATED.3IONS-SCNC: 11 MMOL/L (ref 7–15)
AST SERPL W P-5'-P-CCNC: 19 U/L (ref 0–45)
BASOPHILS # BLD AUTO: 0 10E3/UL (ref 0–0.2)
BASOPHILS NFR BLD AUTO: 0 %
BILIRUB SERPL-MCNC: 0.3 MG/DL
BUN SERPL-MCNC: 19.2 MG/DL (ref 8–23)
CALCIUM SERPL-MCNC: 9.2 MG/DL (ref 8.8–10.2)
CHLORIDE SERPL-SCNC: 101 MMOL/L (ref 98–107)
CREAT SERPL-MCNC: 0.74 MG/DL (ref 0.51–0.95)
DEPRECATED HCO3 PLAS-SCNC: 25 MMOL/L (ref 22–29)
EGFRCR SERPLBLD CKD-EPI 2021: 84 ML/MIN/1.73M2
EOSINOPHIL # BLD AUTO: 0.2 10E3/UL (ref 0–0.7)
EOSINOPHIL NFR BLD AUTO: 4 %
ERYTHROCYTE [DISTWIDTH] IN BLOOD BY AUTOMATED COUNT: 14 % (ref 10–15)
GLUCOSE SERPL-MCNC: 116 MG/DL (ref 70–99)
HCT VFR BLD AUTO: 41.4 % (ref 35–47)
HGB BLD-MCNC: 14 G/DL (ref 11.7–15.7)
IMM GRANULOCYTES # BLD: 0 10E3/UL
IMM GRANULOCYTES NFR BLD: 0 %
LYMPHOCYTES # BLD AUTO: 2 10E3/UL (ref 0–5.3)
LYMPHOCYTES NFR BLD AUTO: 37 %
MCH RBC QN AUTO: 30.6 PG (ref 26.5–33)
MCHC RBC AUTO-ENTMCNC: 33.8 G/DL (ref 31.5–36.5)
MCV RBC AUTO: 90 FL (ref 78–100)
MONOCYTES # BLD AUTO: 0.5 10E3/UL (ref 0–1.3)
MONOCYTES NFR BLD AUTO: 9 %
NEUTROPHILS # BLD AUTO: 2.7 10E3/UL (ref 1.6–8.3)
NEUTROPHILS NFR BLD AUTO: 49 %
NRBC # BLD AUTO: 0 10E3/UL
NRBC BLD AUTO-RTO: 0 /100
PLATELET # BLD AUTO: 193 10E3/UL (ref 150–450)
POTASSIUM SERPL-SCNC: 4.2 MMOL/L (ref 3.4–5.3)
PROT SERPL-MCNC: 7.3 G/DL (ref 6.4–8.3)
RBC # BLD AUTO: 4.58 10E6/UL (ref 3.8–5.2)
SODIUM SERPL-SCNC: 137 MMOL/L (ref 135–145)
WBC # BLD AUTO: 5.4 10E3/UL (ref 4–11)

## 2024-03-05 PROCEDURE — 83521 IG LIGHT CHAINS FREE EACH: CPT | Mod: ZL

## 2024-03-05 PROCEDURE — 84165 PROTEIN E-PHORESIS SERUM: CPT | Mod: ZL | Performed by: PATHOLOGY

## 2024-03-05 PROCEDURE — 80053 COMPREHEN METABOLIC PANEL: CPT | Mod: ZL

## 2024-03-05 PROCEDURE — 82784 ASSAY IGA/IGD/IGG/IGM EACH: CPT | Mod: ZL

## 2024-03-05 PROCEDURE — 84165 PROTEIN E-PHORESIS SERUM: CPT | Mod: 26 | Performed by: PATHOLOGY

## 2024-03-05 PROCEDURE — 85810 BLOOD VISCOSITY EXAMINATION: CPT | Mod: ZL

## 2024-03-05 PROCEDURE — 86334 IMMUNOFIX E-PHORESIS SERUM: CPT | Mod: 26 | Performed by: PATHOLOGY

## 2024-03-05 PROCEDURE — 86334 IMMUNOFIX E-PHORESIS SERUM: CPT | Mod: ZL | Performed by: PATHOLOGY

## 2024-03-05 PROCEDURE — 82232 ASSAY OF BETA-2 PROTEIN: CPT | Mod: ZL

## 2024-03-05 PROCEDURE — 36415 COLL VENOUS BLD VENIPUNCTURE: CPT | Mod: ZL

## 2024-03-05 PROCEDURE — 85048 AUTOMATED LEUKOCYTE COUNT: CPT | Mod: ZL

## 2024-03-05 PROCEDURE — 84155 ASSAY OF PROTEIN SERUM: CPT | Mod: ZL

## 2024-03-06 LAB
ALBUMIN SERPL ELPH-MCNC: 4.2 G/DL (ref 3.7–5.1)
ALPHA1 GLOB SERPL ELPH-MCNC: 0.3 G/DL (ref 0.2–0.4)
ALPHA2 GLOB SERPL ELPH-MCNC: 0.8 G/DL (ref 0.5–0.9)
B-GLOBULIN SERPL ELPH-MCNC: 0.7 G/DL (ref 0.6–1)
B2 MICROGLOB TUMOR MARKER SER-MCNC: 2.2 MG/L
GAMMA GLOB SERPL ELPH-MCNC: 1.1 G/DL (ref 0.7–1.6)
IGA SERPL-MCNC: 15 MG/DL (ref 84–499)
IGG SERPL-MCNC: 1128 MG/DL (ref 610–1616)
IGM SERPL-MCNC: 22 MG/DL (ref 35–242)
KAPPA LC FREE SER-MCNC: 0.56 MG/DL (ref 0.33–1.94)
KAPPA LC FREE/LAMBDA FREE SER NEPH: 1.87 {RATIO} (ref 0.26–1.65)
LAMBDA LC FREE SERPL-MCNC: 0.3 MG/DL (ref 0.57–2.63)
LOCATION OF TASK: ABNORMAL
LOCATION OF TASK: NORMAL
M PROTEIN SERPL ELPH-MCNC: 0.8 G/DL
PROT PATTERN SERPL ELPH-IMP: ABNORMAL
PROT PATTERN SERPL IFE-IMP: NORMAL
TOTAL PROTEIN SERUM FOR ELP: 7 G/DL (ref 6.4–8.3)
VISC SER: 1.5 CP (ref 1.4–1.8)

## 2024-03-07 ENCOUNTER — HOSPITAL ENCOUNTER (EMERGENCY)
Facility: HOSPITAL | Age: 74
Discharge: HOME | End: 2024-03-07
Attending: NURSE PRACTITIONER | Admitting: NURSE PRACTITIONER
Payer: MEDICARE

## 2024-03-07 VITALS
TEMPERATURE: 98.6 F | DIASTOLIC BLOOD PRESSURE: 80 MMHG | SYSTOLIC BLOOD PRESSURE: 124 MMHG | RESPIRATION RATE: 16 BRPM | OXYGEN SATURATION: 96 % | WEIGHT: 170 LBS | HEART RATE: 90 BPM | BODY MASS INDEX: 30.12 KG/M2 | HEIGHT: 63 IN

## 2024-03-07 DIAGNOSIS — H72.92 PERFORATION OF TYMPANIC MEMBRANE, LEFT: ICD-10-CM

## 2024-03-07 DIAGNOSIS — H72.92 PERFORATED TYMPANIC MEMBRANE, LEFT: Primary | ICD-10-CM

## 2024-03-07 DIAGNOSIS — B34.9 ACUTE VIRAL SYNDROME: ICD-10-CM

## 2024-03-07 PROCEDURE — 99213 OFFICE O/P EST LOW 20 MIN: CPT | Performed by: NURSE PRACTITIONER

## 2024-03-07 PROCEDURE — G0463 HOSPITAL OUTPT CLINIC VISIT: HCPCS

## 2024-03-07 RX ORDER — OFLOXACIN 3 MG/ML
5 SOLUTION AURICULAR (OTIC) 2 TIMES DAILY
Qty: 10 ML | Refills: 0 | Status: SHIPPED | OUTPATIENT
Start: 2024-03-07 | End: 2024-03-12

## 2024-03-07 ASSESSMENT — ENCOUNTER SYMPTOMS
SHORTNESS OF BREATH: 0
FEVER: 0
EYE DISCHARGE: 0
MYALGIAS: 0
PSYCHIATRIC NEGATIVE: 1
RHINORRHEA: 1
DIARRHEA: 0
SORE THROAT: 1
VOMITING: 0
EYE REDNESS: 0
CHILLS: 0
COUGH: 1
HEADACHES: 0

## 2024-03-07 ASSESSMENT — ACTIVITIES OF DAILY LIVING (ADL): ADLS_ACUITY_SCORE: 35

## 2024-03-07 NOTE — ED TRIAGE NOTES
Pt reports ear pain, congestion. Pt is scheduled for chemotherapy on Tuesday. Pt is requesting to be checked over.

## 2024-03-07 NOTE — ED PROVIDER NOTES
History   No chief complaint on file.    HPI  Carmelita Watts is a 73 year old female who presents to urgent care today ambulatory with complaints of nasal congestion, left ear pain, rhinorrhea and cough.  Symptoms started 2 days ago, nasal congestion and rhinorrhea improving.  Denies any fever, chills, nausea, vomiting, diarrhea, shortness of breath or chest pain.  Patient has left perforated TM, has message out to ENT for a refill of ofloxacin eardrops, no drainage.  No other concerns.    Allergies:  Allergies   Allergen Reactions    Lisinopril Cough    Phenylephrine Hcl Other (See Comments)     **Entex  HEADACHE (SEVERE)    Phenylpropanolamine Other (See Comments)     **Entex  HEADACHE (SEVERE)    Pseudoephedrine Tannate Other (See Comments)     **Entex  HEADACHE (SEVERE)    Levofloxacin Rash     **Levaquin    Moxifloxacin Hcl [Moxifloxacin Hcl In Nacl] Rash       Problem List:    Patient Active Problem List    Diagnosis Date Noted    Arthritis 11/27/2023     Priority: Medium    SOB (shortness of breath) 09/20/2021     Priority: Medium    Diabetes mellitus, type 2 (H) 01/18/2021     Priority: Medium    Multiple myeloma not having achieved remission (H) 06/24/2019     Priority: Medium    ACP (advance care planning) 10/26/2016     Priority: Medium     Advance Care Planning 10/26/2016: ACP Review of Chart / Resources Provided:  Reviewed chart for advance care plan.  Carmelita Watts has no plan or code status on file. Discussed available resources and provided with information. Confirmed code status reflects current choices pending further ACP discussions.  Confirmed/documented legally designated decision makers.  Added by Alison Landeros            Major depressive disorder, recurrent episode, mild (H24) 10/01/2015     Priority: Medium    Seasonal allergies 10/01/2015     Priority: Medium    Mixed hyperlipidemia 10/01/2015     Priority: Medium    Hyperlipidemia LDL goal <130 07/05/2013     Priority: Medium     "Hypertension goal BP (blood pressure) < 140/80 07/05/2013     Priority: Medium    Advanced care planning/counseling discussion 07/09/2012     Priority: Medium        Past Medical History:    Past Medical History:   Diagnosis Date    Arthritis     Cyst of breast     Depressive disorder     Diabetes mellitus, type 2 (H) 1/18/2021    Essential hypertension 10/1/2015    Major depressive disorder, recurrent episode, mild (H24) 10/1/2015    Mixed hyperlipidemia 10/1/2015    Multiple myeloma not having achieved remission (H) 6/24/2019    Other specified disorders of bladder 07/09/2012    Seasonal allergies 10/1/2015    Unspecified essential hypertension 03/19/2007    Unspecified sinusitis (chronic) 09/05/2007       Past Surgical History:    Past Surgical History:   Procedure Laterality Date    APPENDECTOMY      BONE MARROW BIOPSY, BONE SPECIMEN, NEEDLE/TROCAR N/A 6/18/2019    Procedure: BONE MARROW BIOPSY;  Surgeon: Maciej Sanz MD;  Location: [facility]    CHOLECYSTECTOMY      COLONOSCOPY  07-    repeat 10 years    COLONOSCOPY N/A 12/30/2016    Procedure: COLONOSCOPY;  Surgeon: Bhaskar Franklin DO;  Location: [facility]    PUNC/ASPIR BREAST CYST Left 1995    benign    SINUS SURGERY      TUBAL STERILIZATION         Family History:    Family History   Problem Relation Age of Onset    Breast Cancer Mother 60        Cause of Death    Parkinsonism Father         \"Possible\"    Coronary Artery Disease Father     Pacemaker Father     Thyroid Disease Daughter     Breast Cancer Maternal Aunt         over 50    Diabetes No family hx of     Hypertension No family hx of     Hyperlipidemia No family hx of     Cerebrovascular Disease No family hx of     Colon Cancer No family hx of     Prostate Cancer No family hx of     Genetic Disorder No family hx of     Asthma No family hx of     Anesthesia Reaction No family hx of        Social History:  Marital Status:   [5]  Social History     Tobacco Use    Smoking status: Never    " "Smokeless tobacco: Never    Tobacco comments:     Tried to Quit (YES); QUIT in 1971; Passive Exposure (NO)   Vaping Use    Vaping Use: Never used   Substance Use Topics    Alcohol use: Yes     Comment: RARELY    Drug use: No        Medications:    ofloxacin (FLOXIN) 0.3 % otic solution  acyclovir (ZOVIRAX) 400 MG tablet  aspirin (ASA) 81 MG chewable tablet  atorvastatin (LIPITOR) 20 MG tablet  diphenhydrAMINE (BENADRYL) 25 MG capsule  fluticasone (FLONASE) 50 MCG/ACT nasal spray  L-Lysine HCl 500 MG CAPS  losartan (COZAAR) 50 MG tablet  sertraline (ZOLOFT) 50 MG tablet      Review of Systems   Constitutional:  Negative for chills and fever.   HENT:  Positive for congestion, ear pain (Left), rhinorrhea and sore throat (Resolved).    Eyes:  Negative for discharge and redness.   Respiratory:  Positive for cough. Negative for shortness of breath.    Cardiovascular:  Negative for chest pain.   Gastrointestinal:  Negative for diarrhea and vomiting.   Genitourinary:  Negative for decreased urine volume.   Musculoskeletal:  Negative for gait problem and myalgias.   Skin:  Negative for rash.   Neurological:  Negative for headaches.   Psychiatric/Behavioral: Negative.       Physical Exam   BP: 124/80  Pulse: 90  Temp: 98.6  F (37  C)  Resp: 16  Height: 160 cm (5' 3\")  Weight: 77.1 kg (170 lb)  SpO2: 96 %    Physical Exam  Vitals and nursing note reviewed.   Constitutional:       General: She is not in acute distress.     Appearance: Normal appearance. She is not ill-appearing or toxic-appearing.   HENT:      Right Ear: Tympanic membrane, ear canal and external ear normal.      Left Ear: No drainage. Tympanic membrane is injected and perforated.      Nose: Congestion and rhinorrhea present.      Mouth/Throat:      Mouth: Mucous membranes are moist.      Pharynx: Oropharynx is clear. No oropharyngeal exudate or posterior oropharyngeal erythema.   Cardiovascular:      Rate and Rhythm: Normal rate and regular rhythm.      Pulses: " Normal pulses.      Heart sounds: Normal heart sounds.   Pulmonary:      Effort: Pulmonary effort is normal.      Breath sounds: Normal breath sounds.   Abdominal:      General: Bowel sounds are normal.      Palpations: Abdomen is soft.   Musculoskeletal:      Cervical back: Normal range of motion and neck supple.   Neurological:      Mental Status: She is alert.   Psychiatric:         Mood and Affect: Mood normal.       ED Course     No results found for this or any previous visit (from the past 24 hour(s)).    Medications - No data to display    Assessments & Plan (with Medical Decision Making)     I have reviewed the nursing notes.    I have reviewed the findings, diagnosis, plan and need for follow up with the patient.  (H72.92) Perforated tympanic membrane, left  (primary encounter diagnosis)  (B34.9) Acute viral syndrome  Plan:   Patient ambulatory with a nontoxic appearance.  Lungs clear throughout denies any shortness of breath or chest pain.  Left TM slightly injected, perforated TM, chronic.  No throat erythema or signs of strep.  Moderate amount of nasal congestion.  Staying hydrated.  No rashes.  Declines any COVID, influenza or RSV testing today.  Took an at home COVID test which was negative.  Declines any imaging or lab workup today.  Ofloxacin eardrops ordered.  Symptomatic treatment recommendations provided.  Alternate Tylenol and ibuprofen as needed for pain.  Push fluids.  Over-the-counter Flonase as needed for congestion, patient states she has Flonase at home.  Follow-up with primary care provider or return to urgent care/ED with any worsening in condition or additional concerns.  Patient in agreement with treatment plan.    Discharge Medication List as of 3/7/2024  1:24 PM        START taking these medications    Details   ofloxacin (FLOXIN) 0.3 % otic solution Place 5 drops Into the left ear 2 times daily for 10 days, Disp-10 mL, R-0, E-Prescribe           Final diagnoses:   Perforated  tympanic membrane, left   Acute viral syndrome     3/7/2024   [facility]       Bindu Ordoñez, MATTHIEU  03/07/24 [id number]

## 2024-03-07 NOTE — DISCHARGE INSTRUCTIONS
Ofloxacin eardrops as ordered    Alternate Tylenol and ibuprofen as needed for pain or fever    Push fluids    Over-the-counter Flonase as needed for nasal congestion    Follow-up with primary care provider or return to urgent care/ED with any worsening in condition or additional concerns.

## 2024-03-07 NOTE — TELEPHONE ENCOUNTER
Floxin      Last Written Prescription Date:  6/15/23  Last Fill Quantity: 4mL,   # refills: 0  Last Office Visit: 10/16/23  Future Office visit:    Next 5 appointments (look out 90 days)      Mar 12, 2024  9:10 AM  (Arrive by 8:55 AM)  Return Visit with FLO [facility] ([facility] [address]  [facility] [address]  [phone]     Apr 02, 2024 10:15 AM  (Arrive by 10:00 AM)  Nurse Only with Cely Arteaga  [facility] ([facility] ) [address]  [facility] [address]  [phone]     Apr 16, 2024  8:45 AM  (Arrive by 8:30 AM)  Return Visit with Bridgette Ly PA-C  [facility] ([facility] ) [address]  [facility] [address]  [phone]             Routing refill request to provider for review/approval because:

## 2024-03-08 RX ORDER — OFLOXACIN 3 MG/ML
5 SOLUTION AURICULAR (OTIC) 2 TIMES DAILY
Qty: 5 ML | Refills: 0 | OUTPATIENT
Start: 2024-03-08 | End: 2024-03-15

## 2024-03-11 RX ORDER — DIPHENHYDRAMINE HCL 50 MG
50 CAPSULE ORAL ONCE
Status: CANCELLED | OUTPATIENT
Start: 2024-03-12

## 2024-03-11 NOTE — PROGRESS NOTES
Oncology Follow-up Visit    Reason for Visit:  Carmelita is a 73 year old woman with a diagnosis of multiple myeloma, who I am visiting with today for routine follow-up and consideration of ongoing therapy.     Nursing Note and documentation reviewed: Yes    Interval History:   Carlene notes that she is doing well. No big concerns. She was seen in the ED last week for a URI. Started having sore throat, runny nose, congestion, cough last Tuesday. Developed ear ache. Went to ED. Home covid had been negative. ER gave option of CXR and flu/RSV testing but patient declined as they presumed she has influenza. Patient has been working on symptom management and feel that things are improving. No fevers, less cough, less congestion.     She otherwise denies bleeding concerns. No nausea or vomiting. Appetite down and having to force herself to eat a bit more but weight stable. No bowel concerns. No skin concerns. She denies any new or particular bony aches or pain. Energy levels down the last week but improving. All in all, she had stayed active and is doing well.     Oncologic History  12/31/2018 Presented to the emergency room with vertigo and fatigue.  CT scan of the head was negative and subsequent stress test was negative.  05/03/2019 PCP ordered lab work and noted a total protein of 12.9.  SPEP at that time showed an M spike of 6.2 with a large monoclonal protein seen in the gamma fraction. Urine immunofixation showed a possible small protein band in the gamma fraction  05/31/2019 Evaluated by Dr. Walker with Medical Oncology with plan to rule out myeloma and obtain bone marrow aspiration biopsy as well as a metastatic bone survey along with additional labwork  06/18/2019 Underwent bone marrow aspiration and biopsy  06/24/2019 Seen again by Dr. Walker and CBC showed a hemoglobin of 9.3, M spike 7.3 with monoclonal IgG immunoglobulin of kappa light chain type; serum viscosity was 2.9; quantitative immunoglobulins showed an  IgG of 8160, beta-2 microglobulin was 5.8, BUN was 21 with creatinine is 0.8 and total protein was 13.7.  Quantitative kappa/lambda free light chains showed an elevated ratio of 17.0 bone marrow aspiration biopsy showed plasma cell myeloma with approximately 80% plasma cells.  Immunofixation showed IgG kappa and flow cytometry revealed kappa monotypic plasma cells consistent with clonal plasma cell neoplasm and FISH panel was pending at that time. It was felt she had at least stage II disease based on her beta-2 microglobulin and anemia. Plan was to treat with Velcade 1.3 mg per/m2 days 1, 4, 8 and 11/Decadron 40 mg on days 1, 8 and 15. Initiation of Revlimid with C2 at 25 mg daily days 1 through 14.  Plan was to also obtain an MRI of the lumbar spine to rule out lytic lesion at L3.  She was initiated on Zovirax 400 mg p.o. twice daily.  06/25/2019 C1 of chemotherapy initiated  07/01/2019 Note in chart regarding patient's large co-pay for the Revlimid and no plan at this point to initiate Revlimid and treat only with Velcade and Decadron per Dr. Walker  07/11/2019 MRI lumbar spine shows a pathologic superior endplate compression fracture at L3 without evidence of retropulsed fragment and innumerable enhancing lesions throughout the lumbar spine consistent with history of multiple myeloma.  09/10/2019 Increasing M-spike and kappa lambda ratio  10/01/2019 Initiation of CyBorD  11/17/2020 Plateau of M-spike at 1.2 after 36 cycles of CyBorD  12/01/2020 Initiation of Darzalex Faspro injection  03/23/2021 M-spike increased form 1.0 to 1.2 and velcade and dexamethasone added to plan  04/12/2022 Treatment HOLIDAY commenced with Mspike 0.3 after 22 cycles of treatent  05/27/2022 Received Evusheld while on treatment holiday.   07/18/2023 Mspike 0.8. Other labs stable. PET scan ordered.   08/02/2023 PET completed showing negative study. No evidence of mass or lymphadenopathy. No concerning hypermetabolism is  "present.  08/14/2023 Met with Dr. Walker. Given biochemical progression, he did feel appropriate to resume therapy again with Darzalex faspro  03/12/2023 Following C7, mspike magy from 0.2-->0.8. PET ordered.     Current Chemo Regime/TX: Darzalex faspro injection 1800mg subcutaneous per protocol  Current Cycle: 8  Completed cycles: 7  **C1/C2 Days 1, 8, 15, 22  **C3/C4/C5/C6 Days 1, 15  *C7 and so forth...D1     Previous treatment:   Velcade 1.3 mg/m2 days 1, 4, 8 and 11 with Decadron 40 mg days 1, 8 and 15 x 4 cycles;   Velcade 1.5mg.m2/cyclophosphamide 150mg every 7 days on days 1,8,15 and 22/decadron 40mg days 1,8,15,22 ; Darzalex faspro injection 1800mg subcutaneous per protocol    Past Medical History:   Diagnosis Date    Arthritis     Cyst of breast     Depressive disorder     Diabetes mellitus, type 2 (H) 1/18/2021    Essential hypertension 10/1/2015    Major depressive disorder, recurrent episode, mild (H24) 10/1/2015    Mixed hyperlipidemia 10/1/2015    Multiple myeloma not having achieved remission (H) 6/24/2019    Other specified disorders of bladder 07/09/2012    Irritable Bladder    Seasonal allergies 10/1/2015    Unspecified essential hypertension 03/19/2007    Unspecified sinusitis (chronic) 09/05/2007       Past Surgical History:   Procedure Laterality Date    APPENDECTOMY      BONE MARROW BIOPSY, BONE SPECIMEN, NEEDLE/TROCAR N/A 6/18/2019    Procedure: BONE MARROW BIOPSY;  Surgeon: Maciej Sanz MD;  Location: [facility] OR    CHOLECYSTECTOMY      COLONOSCOPY  07-    repeat 10 years    COLONOSCOPY N/A 12/30/2016    Procedure: COLONOSCOPY;  Surgeon: Bhaskar Franklin DO;  Location: [facility] OR    PUNC/ASPIR BREAST CYST Left 1995    benign    SINUS SURGERY      TUBAL STERILIZATION         Family History   Problem Relation Age of Onset    Breast Cancer Mother 60        Cause of Death    Parkinsonism Father         \"Possible\"    Coronary Artery Disease Father     Pacemaker Father     Thyroid Disease " Daughter     Breast Cancer Maternal Aunt         over 50    Diabetes No family hx of     Hypertension No family hx of     Hyperlipidemia No family hx of     Cerebrovascular Disease No family hx of     Colon Cancer No family hx of     Prostate Cancer No family hx of     Genetic Disorder No family hx of     Asthma No family hx of     Anesthesia Reaction No family hx of        Social History     Socioeconomic History    Marital status:      Spouse name: Not on file    Number of children: Not on file    Years of education: Not on file    Highest education level: Not on file   Occupational History    Occupation: Financial     Comment:  - (FT)   Tobacco Use    Smoking status: Never    Smokeless tobacco: Never    Tobacco comments:     Tried to Quit (YES); QUIT in 1971; Passive Exposure (NO)   Vaping Use    Vaping Use: Never used   Substance and Sexual Activity    Alcohol use: Yes     Comment: RARELY    Drug use: No    Sexual activity: Yes     Partners: Male     Birth control/protection: None   Other Topics Concern     Service Not Asked    Blood Transfusions Not Asked    Caffeine Concern Yes     Comment: Coffee >6 cups daily    Occupational Exposure Not Asked    Hobby Hazards Not Asked    Sleep Concern Not Asked    Stress Concern Not Asked    Weight Concern Not Asked    Special Diet Not Asked    Back Care Not Asked    Exercise Not Asked    Bike Helmet Not Asked    Seat Belt Not Asked    Self-Exams Not Asked    Parent/sibling w/ CABG, MI or angioplasty before 65F 55M? No   Social History Narrative    Not on file     Social Determinants of Health     Financial Resource Strain: Low Risk  (11/27/2023)    Financial Resource Strain     Within the past 12 months, have you or your family members you live with been unable to get utilities (heat, electricity) when it was really needed?: No   Food Insecurity: Low Risk  (11/27/2023)    Food Insecurity     Within the past 12 months, did you worry that your  food would run out before you got money to buy more?: No     Within the past 12 months, did the food you bought just not last and you didn t have money to get more?: No   Transportation Needs: Low Risk  (11/27/2023)    Transportation Needs     Within the past 12 months, has lack of transportation kept you from medical appointments, getting your medicines, non-medical meetings or appointments, work, or from getting things that you need?: No   Physical Activity: Not on file   Stress: Not on file   Social Connections: Not on file   Interpersonal Safety: Low Risk  (11/27/2023)    Interpersonal Safety     Do you feel physically and emotionally safe where you currently live?: Yes     Within the past 12 months, have you been hit, slapped, kicked or otherwise physically hurt by someone?: No     Within the past 12 months, have you been humiliated or emotionally abused in other ways by your partner or ex-partner?: No   Housing Stability: Low Risk  (11/27/2023)    Housing Stability     Do you have housing? : Yes     Are you worried about losing your housing?: No       Current Outpatient Medications   Medication    acyclovir (ZOVIRAX) 400 MG tablet    aspirin (ASA) 81 MG chewable tablet    atorvastatin (LIPITOR) 20 MG tablet    diphenhydrAMINE (BENADRYL) 25 MG capsule    fluticasone (FLONASE) 50 MCG/ACT nasal spray    L-Lysine HCl 500 MG CAPS    losartan (COZAAR) 50 MG tablet    sertraline (ZOLOFT) 50 MG tablet     No current facility-administered medications for this visit.        Allergies   Allergen Reactions    Lisinopril Cough    Phenylephrine Hcl Other (See Comments)     **Entex  HEADACHE (SEVERE)    Phenylpropanolamine Other (See Comments)     **Entex  HEADACHE (SEVERE)    Pseudoephedrine Tannate Other (See Comments)     **Entex  HEADACHE (SEVERE)    Levofloxacin Rash     **Levaquin    Moxifloxacin Hcl [Moxifloxacin Hcl In Nacl] Rash       Review Of Systems:  A complete review of systems is negative except for the above  "mentioned items in the interval history.     Physical Exam:  /60 (BP Location: Right arm, Patient Position: Sitting, Cuff Size: Adult Regular)   Pulse 72   Temp 98.4  F (36.9  C) (Tympanic)   Ht 1.6 m (5' 3\")   Wt 78.1 kg (172 lb 2.9 oz)   SpO2 97%   BMI 30.50 kg/m    GENERAL APPEARANCE: Healthy, alert and in no acute distress.  HEENT: Eyes appear normal without scleral icterus. Extraocular movements intact.   NECK:   Supple with normal range of motion. No asymmetry or masses.  LYMPHATICS: No palpable cervical, supraclavicular nodes.  RESP: Lungs clear to auscultation bilaterally, respirations regular and easy.  CARDIOVASCULAR: Regular rate and rhythm. Normal S1, S2; no murmur, gallop, or rub.  ABDOMEN: Soft, non-tender, non-distended. No palpable organomegaly or masses.  MUSCULOSKELETAL: Extremities without gross deformities noted. No edema of bilateral lower extremities.  SKIN: No suspicious lesions or rashes.  NEURO: Alert and oriented x 3.  Gait steady.  PSYCHIATRIC: Mentation and affect appear normal.  Mood appropriate.    Laboratory:    Component      Latest Ref [facility] 3/5/2024  9:35 AM   WBC      4.0 - 11.0 10e3/uL 5.4    RBC Count      3.80 - 5.20 10e6/uL 4.58    Hemoglobin      11.7 - 15.7 g/dL 14.0    Hematocrit      35.0 - 47.0 % 41.4    MCV      78 - 100 fL 90    MCH      26.5 - 33.0 pg 30.6    MCHC      31.5 - 36.5 g/dL 33.8    RDW      10.0 - 15.0 % 14.0    Platelet Count      150 - 450 10e3/uL 193    % Neutrophils      % 49    % Lymphocytes      % 37    % Monocytes      % 9    % Eosinophils      % 4    % Basophils      % 0    % Immature Granulocytes      % 0    NRBCs per 100 WBC      <1 /100 0    Absolute Neutrophils      1.6 - 8.3 10e3/uL 2.7    Absolute Lymphocytes      0.0 - 5.3 10e3/uL 2.0    Absolute Monocytes      0.0 - 1.3 10e3/uL 0.5    Absolute Eosinophils      0.0 - 0.7 10e3/uL 0.2    Absolute Basophils      0.0 - 0.2 10e3/uL 0.0    Absolute Immature Granulocytes      <=0.4 10e3/uL " 0.0    Absolute NRBCs      10e3/uL 0.0    Sodium      135 - 145 mmol/L 137    Potassium      3.4 - 5.3 mmol/L 4.2    Carbon Dioxide (CO2)      22 - 29 mmol/L 25    Anion Gap      7 - 15 mmol/L 11    Urea Nitrogen      8.0 - 23.0 mg/dL 19.2    Creatinine      0.51 - 0.95 mg/dL 0.74    GFR Estimate      >60 mL/min/1.73m2 84    Calcium      8.8 - 10.2 mg/dL 9.2    Chloride      98 - 107 mmol/L 101    Glucose      70 - 99 mg/dL 116 (H)    Alkaline Phosphatase      40 - 150 U/L 147    AST      0 - 45 U/L 19    ALT      0 - 50 U/L 13    Protein Total      6.4 - 8.3 g/dL 7.3    Albumin      3.5 - 5.2 g/dL 4.2    Bilirubin Total      <=1.2 mg/dL 0.3    Albumin Fraction      3.7 - 5.1 g/dL 4.2    Alpha 1 Fraction      0.2 - 0.4 g/dL 0.3    Alpha 2 Fraction      0.5 - 0.9 g/dL 0.8    Beta Fraction      0.6 - 1.0 g/dL 0.7    Gamma Fraction      0.7 - 1.6 g/dL 1.1    Monoclonal Peak      <=0.0 g/dL 0.8 (H)    ELP Interpretation: Monoclonal protein (about 0.8 g/dL) seen in the gamma fraction. See immunofixation report on same specimen. Normal, non-monoclonal immunoglobulins appear to be very depressed. Pathologic significance requires clinical correlation. BIA Izaguirre M.D., Ph.D., Pathologist ().    Signout Location if Remote [facility]    Signout Location if Remote [facility]    IGG      610 - 1,616 mg/dL 1,128    IGA      84 - 499 mg/dL 15 (L)    IGM      35 - 242 mg/dL 22 (L)    Gilberts Free Lt Chain      0.33 - 1.94 mg/dL 0.56    Lambda Free Lt Chain      0.57 - 2.63 mg/dL 0.30 (L)    Kappa Lambda Ratio      0.26 - 1.65  1.87 (H)    Immunofixation ELP Fairly large monoclonal IgG kappa. Monoclonal antibody therapeutics (e.g. Daratumumab) may appear as monoclonal proteins on serum electrophoresis and immunofixation, but almost always as only very small IgG kappa monoclonals. Nevertheless, results should be interpreted with caution if the patient is receiving monoclonal antibody therapy. Pathologic significance requires  clinical correlation. BIA Izaguirre M.D., Ph.D., Pathologist ()    Beta-2-Microglobulin      <2.3 mg/L 2.2    Viscosity Index      1.4 - 1.8  1.5    Total Protein Serum for ELP      6.4 - 8.3 g/dL 7.0       Legend:  (H) High  (L) Low    Imaging Studies:  None this visit.     ASSESSMENT/PLAN:  #1 Multiple myeloma IgG kappa multiple myeloma with 80% involvement of the bone marrow diagnosed June 2019 initially treated with Velcade and Decadron and received 4 cycles with increasing M-spike and kappa/lambda ratio.  Treatment changed to CyBorD on 10/1/2019.  M-spike plateau at 1.2 and treatment changed to monotherapy with Darzalex Faspro injection. M spike declined to 1.0 then increased again to 1.2 so Velcade and Dex added to her treatment plan with cycle 5 therapy.     Following C22 (April 2022), she did initiate a treatment holiday when her Mspike was 0.3. We had followed her counts for over a year. Unfortunately, most recently, she did see a rise in her mspike to 0.8. PET completed was negative. Dr. Walker felt this was a biochemical progression and after discussion with patient, elected to resume treatment with Darzalex faspro.     Today, she presents having just completed C7 Darzelex, unfortunately, her mspike has taken a jump from 0.2 to 0.8. The remainder of her labs are stable, and no evidence of end organ damage. I reviewed labs with Dr. Walker. He would like to obtain a PET scan to determine if any progression of disease, or if more so a biochemical progression. We will continue therapy, however, work on obtaining PET scan. I will have her follow-up with Dr. Walker afterwards to discuss results and plan going forward.     #2 Lytic lesions Hx lytic lesion at L3. Mspike rising. Will obtain a PET. She is next due for Zometa in April. She remains on calcium with vitamin D twice a day. Weight bearing activity.      Patient in agreement with plan and verbalizes understanding. Agrees to call with  any questions or concerns.      43 minutes spent in the patient's encounter today with time spent in review of patient's chart along with chart preparation and review of the treatment plan and signing of treatment plan.  Time was also spent with the patient in obtaining a review of systems and performing a physical exam along with detailed review of all test results. Time was also spent in discussing plan for future follow-up and relating instructions for follow-up and in placing future orders.      FLO Duque, FNP-C

## 2024-03-12 ENCOUNTER — INFUSION THERAPY VISIT (OUTPATIENT)
Dept: INFUSION THERAPY | Facility: OTHER | Age: 74
End: 2024-03-12
Attending: INTERNAL MEDICINE
Payer: MEDICARE

## 2024-03-12 ENCOUNTER — LAB (OUTPATIENT)
Dept: ONCOLOGY | Facility: OTHER | Age: 74
End: 2024-03-12
Attending: NURSE PRACTITIONER
Payer: MEDICARE

## 2024-03-12 VITALS
WEIGHT: 172.18 LBS | OXYGEN SATURATION: 97 % | HEART RATE: 72 BPM | HEIGHT: 63 IN | DIASTOLIC BLOOD PRESSURE: 60 MMHG | BODY MASS INDEX: 30.51 KG/M2 | TEMPERATURE: 98.4 F | SYSTOLIC BLOOD PRESSURE: 118 MMHG

## 2024-03-12 DIAGNOSIS — C90.00 MULTIPLE MYELOMA NOT HAVING ACHIEVED REMISSION (H): Primary | ICD-10-CM

## 2024-03-12 DIAGNOSIS — M89.9 BONE LESION: ICD-10-CM

## 2024-03-12 LAB
BASOPHILS # BLD AUTO: 0 10E3/UL (ref 0–0.2)
BASOPHILS NFR BLD AUTO: 0 %
EOSINOPHIL # BLD AUTO: 0.2 10E3/UL (ref 0–0.7)
EOSINOPHIL NFR BLD AUTO: 4 %
ERYTHROCYTE [DISTWIDTH] IN BLOOD BY AUTOMATED COUNT: 14.1 % (ref 10–15)
HCT VFR BLD AUTO: 40.2 % (ref 35–47)
HGB BLD-MCNC: 13.8 G/DL (ref 11.7–15.7)
IMM GRANULOCYTES # BLD: 0 10E3/UL
IMM GRANULOCYTES NFR BLD: 1 %
LYMPHOCYTES # BLD AUTO: 1.7 10E3/UL (ref 0–5.3)
LYMPHOCYTES NFR BLD AUTO: 32 %
MCH RBC QN AUTO: 30.9 PG (ref 26.5–33)
MCHC RBC AUTO-ENTMCNC: 34.3 G/DL (ref 31.5–36.5)
MCV RBC AUTO: 90 FL (ref 78–100)
MONOCYTES # BLD AUTO: 0.5 10E3/UL (ref 0–1.3)
MONOCYTES NFR BLD AUTO: 10 %
NEUTROPHILS # BLD AUTO: 2.8 10E3/UL (ref 1.6–8.3)
NEUTROPHILS NFR BLD AUTO: 53 %
NRBC # BLD AUTO: 0 10E3/UL
NRBC BLD AUTO-RTO: 0 /100
PLATELET # BLD AUTO: 203 10E3/UL (ref 150–450)
RBC # BLD AUTO: 4.46 10E6/UL (ref 3.8–5.2)
WBC # BLD AUTO: 5.3 10E3/UL (ref 4–11)

## 2024-03-12 PROCEDURE — 85025 COMPLETE CBC W/AUTO DIFF WBC: CPT | Mod: ZL | Performed by: NURSE PRACTITIONER

## 2024-03-12 PROCEDURE — G0463 HOSPITAL OUTPT CLINIC VISIT: HCPCS | Mod: 25

## 2024-03-12 PROCEDURE — G0463 HOSPITAL OUTPT CLINIC VISIT: HCPCS

## 2024-03-12 PROCEDURE — 96402 CHEMO HORMON ANTINEOPL SQ/IM: CPT

## 2024-03-12 PROCEDURE — 36415 COLL VENOUS BLD VENIPUNCTURE: CPT | Mod: ZL | Performed by: NURSE PRACTITIONER

## 2024-03-12 PROCEDURE — 250N000011 HC RX IP 250 OP 636: Mod: JZ | Performed by: NURSE PRACTITIONER

## 2024-03-12 PROCEDURE — 99215 OFFICE O/P EST HI 40 MIN: CPT | Performed by: NURSE PRACTITIONER

## 2024-03-12 RX ORDER — MONTELUKAST SODIUM 10 MG/1
10 TABLET ORAL ONCE
Status: COMPLETED | OUTPATIENT
Start: 2024-03-12 | End: 2024-03-12

## 2024-03-12 RX ORDER — LORAZEPAM 2 MG/ML
0.5 INJECTION INTRAMUSCULAR EVERY 4 HOURS PRN
Status: CANCELLED | OUTPATIENT
Start: 2024-03-12

## 2024-03-12 RX ORDER — ALBUTEROL SULFATE 0.83 MG/ML
2.5 SOLUTION RESPIRATORY (INHALATION)
Status: CANCELLED | OUTPATIENT
Start: 2024-03-12

## 2024-03-12 RX ORDER — DEXAMETHASONE 4 MG/1
12 TABLET ORAL
Status: CANCELLED | OUTPATIENT
Start: 2024-03-12

## 2024-03-12 RX ORDER — DIPHENHYDRAMINE HCL 25 MG
25 CAPSULE ORAL ONCE
Status: CANCELLED | OUTPATIENT
Start: 2024-03-12

## 2024-03-12 RX ORDER — DIPHENHYDRAMINE HCL 25 MG
25 CAPSULE ORAL ONCE
Status: COMPLETED | OUTPATIENT
Start: 2024-03-12 | End: 2024-03-12

## 2024-03-12 RX ORDER — DIPHENHYDRAMINE HYDROCHLORIDE 50 MG/ML
50 INJECTION INTRAMUSCULAR; INTRAVENOUS
Status: CANCELLED
Start: 2024-03-12

## 2024-03-12 RX ORDER — DIPHENHYDRAMINE HCL 50 MG
50 CAPSULE ORAL
Status: CANCELLED | OUTPATIENT
Start: 2024-03-12

## 2024-03-12 RX ORDER — DEXAMETHASONE 4 MG/1
12 TABLET ORAL ONCE
Status: COMPLETED | OUTPATIENT
Start: 2024-03-12 | End: 2024-03-12

## 2024-03-12 RX ORDER — EPINEPHRINE 1 MG/ML
0.3 INJECTION, SOLUTION, CONCENTRATE INTRAVENOUS EVERY 5 MIN PRN
Status: CANCELLED | OUTPATIENT
Start: 2024-03-12

## 2024-03-12 RX ORDER — ALBUTEROL SULFATE 90 UG/1
1-2 AEROSOL, METERED RESPIRATORY (INHALATION)
Status: CANCELLED
Start: 2024-03-12

## 2024-03-12 RX ORDER — HEPARIN SODIUM (PORCINE) LOCK FLUSH IV SOLN 100 UNIT/ML 100 UNIT/ML
5 SOLUTION INTRAVENOUS
Status: CANCELLED | OUTPATIENT
Start: 2024-03-12

## 2024-03-12 RX ORDER — MEPERIDINE HYDROCHLORIDE 25 MG/ML
25 INJECTION INTRAMUSCULAR; INTRAVENOUS; SUBCUTANEOUS EVERY 30 MIN PRN
Status: CANCELLED | OUTPATIENT
Start: 2024-03-12

## 2024-03-12 RX ORDER — METHYLPREDNISOLONE SODIUM SUCCINATE 125 MG/2ML
125 INJECTION, POWDER, LYOPHILIZED, FOR SOLUTION INTRAMUSCULAR; INTRAVENOUS
Status: CANCELLED
Start: 2024-03-12

## 2024-03-12 RX ORDER — ACETAMINOPHEN 325 MG/1
650 TABLET ORAL ONCE
Status: COMPLETED | OUTPATIENT
Start: 2024-03-12 | End: 2024-03-12

## 2024-03-12 RX ORDER — HEPARIN SODIUM,PORCINE 10 UNIT/ML
5-20 VIAL (ML) INTRAVENOUS DAILY PRN
Status: CANCELLED | OUTPATIENT
Start: 2024-03-12

## 2024-03-12 RX ORDER — ACETAMINOPHEN 325 MG/1
650 TABLET ORAL
Status: CANCELLED | OUTPATIENT
Start: 2024-03-12

## 2024-03-12 RX ADMIN — DEXAMETHASONE 12 MG: 4 TABLET ORAL at 10:06

## 2024-03-12 RX ADMIN — Medication 25 MG: at 10:07

## 2024-03-12 RX ADMIN — ACETAMINOPHEN 650 MG: 325 TABLET ORAL at 10:06

## 2024-03-12 RX ADMIN — DARATUMUMAB AND HYALURONIDASE-FIHJ (HUMAN RECOMBINANT) 1800 MG: 1800; 30000 INJECTION SUBCUTANEOUS at 10:51

## 2024-03-12 RX ADMIN — MONTELUKAST SODIUM 10 MG: 10 TABLET ORAL at 10:06

## 2024-03-12 ASSESSMENT — PAIN SCALES - GENERAL: PAINLEVEL: NO PAIN (0)

## 2024-03-12 NOTE — NURSING NOTE
"Oncology Rooming Note    March 12, 2024 8:52 AM   Carmelita Watts is a 73 year old female who presents for:    Chief Complaint   Patient presents with    Oncology Clinic Visit     Follow up. Multiple myeloma not having achieved remission      Initial Vitals: /60 (BP Location: Right arm, Patient Position: Sitting, Cuff Size: Adult Regular)   Pulse 72   Temp 98.4  F (36.9  C) (Tympanic)   Ht 1.6 m (5' 3\")   Wt 78.1 kg (172 lb 2.9 oz)   SpO2 97%   BMI 30.50 kg/m   Estimated body mass index is 30.5 kg/m  as calculated from the following:    Height as of this encounter: 1.6 m (5' 3\").    Weight as of this encounter: 78.1 kg (172 lb 2.9 oz). Body surface area is 1.86 meters squared.  No Pain (0) Comment: Data Unavailable   No LMP recorded. Patient is postmenopausal.  Allergies reviewed: Yes  Medications reviewed: Yes    Medications: Medication refills not needed today.  Pharmacy name entered into [facility]:    [id number] - [place], [place] - [address] AT [facility] [address]  [facility] - [place], [place] - [address] [facility] - [place] - [address] [facility] [address] [facility]    Frailty Screening:   Is the patient here for a new oncology consult visit in cancer care? 2. No      Clinical concerns: none       Julia Andrade LPN              "

## 2024-03-12 NOTE — PROGRESS NOTES
Infusion Nursing Note:  Carmelita Watts presents today for Faspro.    Patient seen by provider today: Yes: Nida Gonzalez   present during visit today: Not Applicable.    Note: See provider assessment on this date for details.    Treatment Conditions:  Results reviewed, labs MET treatment parameters, ok to proceed with treatment.    Component      Latest Ref Rng 3/12/2024  8:22 AM   WBC      4.0 - 11.0 10e3/uL 5.3    RBC Count      3.80 - 5.20 10e6/uL 4.46    Hemoglobin      11.7 - 15.7 g/dL 13.8    Hematocrit      35.0 - 47.0 % 40.2    MCV      78 - 100 fL 90    MCH      26.5 - 33.0 pg 30.9    MCHC      31.5 - 36.5 g/dL 34.3    RDW      10.0 - 15.0 % 14.1    Platelet Count      150 - 450 10e3/uL 203    % Neutrophils      % 53    % Lymphocytes      % 32    % Monocytes      % 10    % Eosinophils      % 4    % Basophils      % 0    % Immature Granulocytes      % 1    NRBCs per 100 WBC      <1 /100 0    Absolute Neutrophils      1.6 - 8.3 10e3/uL 2.8    Absolute Lymphocytes      0.0 - 5.3 10e3/uL 1.7    Absolute Monocytes      0.0 - 1.3 10e3/uL 0.5    Absolute Eosinophils      0.0 - 0.7 10e3/uL 0.2    Absolute Basophils      0.0 - 0.2 10e3/uL 0.0    Absolute Immature Granulocytes      <=0.4 10e3/uL 0.0    Absolute NRBCs      10e3/uL 0.0          Post Infusion Assessment:  Patient tolerated injection without incident.       The following medication was given:     MEDICATION: Faspro  ROUTE: SQ  SITE: LUQ - Abd  DOSE: 1800 mg      Discharge Plan:   Copy of AVS reviewed with patient and/or family.  Patient will return as scheduled for next appointment.  Patient discharged in stable condition accompanied by: self.  Departure Mode: Ambulatory.

## 2024-03-13 ENCOUNTER — ALLIED HEALTH/NURSE VISIT (OUTPATIENT)
Dept: AUDIOLOGY | Facility: OTHER | Age: 74
End: 2024-03-13
Attending: AUDIOLOGIST
Payer: MEDICARE

## 2024-03-13 DIAGNOSIS — H91.93 DECREASED HEARING OF BOTH EARS: Primary | ICD-10-CM

## 2024-03-13 PROCEDURE — V5299 HEARING SERVICE: HCPCS

## 2024-03-13 NOTE — PROGRESS NOTES
Background:  Received repaired Binaural Oticon More 2 miniRITE R hearing aids.       Procedures Performed:  The  completed the following repairs: replaced battery on both aids. Reprogrammed to user settings.    Follow up:   Ms. Watts was called for hearing aid  at .  Return to clinic as needed.      Nila Arteaga  Audiology Assistant  [facility]  [phone]

## 2024-03-21 ENCOUNTER — ANCILLARY PROCEDURE (OUTPATIENT)
Dept: MAMMOGRAPHY | Facility: OTHER | Age: 74
End: 2024-03-21
Attending: PHYSICIAN ASSISTANT
Payer: MEDICARE

## 2024-03-21 ENCOUNTER — TELEPHONE (OUTPATIENT)
Dept: MAMMOGRAPHY | Facility: OTHER | Age: 74
End: 2024-03-21

## 2024-03-21 DIAGNOSIS — Z12.31 ENCOUNTER FOR SCREENING MAMMOGRAM FOR MALIGNANT NEOPLASM OF BREAST: ICD-10-CM

## 2024-03-21 PROCEDURE — 77063 BREAST TOMOSYNTHESIS BI: CPT | Mod: TC

## 2024-03-26 ENCOUNTER — TELEPHONE (OUTPATIENT)
Dept: PET IMAGING | Facility: HOSPITAL | Age: 74
End: 2024-03-26

## 2024-03-26 NOTE — TELEPHONE ENCOUNTER
Made an appointment reminder call regarding [place] Petscan scheduled on 3/27 at 845. Reminded pt to have a low carbohydrate high protein evening meal. Pt can have plain water up to the time of the scan. Test duration is about 2 hours.

## 2024-03-27 ENCOUNTER — LAB (OUTPATIENT)
Dept: LAB | Facility: OTHER | Age: 74
End: 2024-03-27
Attending: NURSE PRACTITIONER
Payer: MEDICARE

## 2024-03-27 ENCOUNTER — HOSPITAL ENCOUNTER (OUTPATIENT)
Dept: PET IMAGING | Facility: HOSPITAL | Age: 74
Discharge: HOME | End: 2024-03-27
Attending: NURSE PRACTITIONER
Payer: MEDICARE

## 2024-03-27 DIAGNOSIS — C90.00 MULTIPLE MYELOMA NOT HAVING ACHIEVED REMISSION (H): ICD-10-CM

## 2024-03-27 LAB
ALBUMIN SERPL BCG-MCNC: 4.1 G/DL (ref 3.5–5.2)
ALP SERPL-CCNC: 136 U/L (ref 40–150)
ALT SERPL W P-5'-P-CCNC: 17 U/L (ref 0–50)
ANION GAP SERPL CALCULATED.3IONS-SCNC: 10 MMOL/L (ref 7–15)
AST SERPL W P-5'-P-CCNC: 22 U/L (ref 0–45)
BASOPHILS # BLD AUTO: 0 10E3/UL (ref 0–0.2)
BASOPHILS NFR BLD AUTO: 0 %
BILIRUB SERPL-MCNC: 0.4 MG/DL
BUN SERPL-MCNC: 16.1 MG/DL (ref 8–23)
CALCIUM SERPL-MCNC: 9 MG/DL (ref 8.8–10.2)
CHLORIDE SERPL-SCNC: 101 MMOL/L (ref 98–107)
CREAT SERPL-MCNC: 0.77 MG/DL (ref 0.51–0.95)
DEPRECATED HCO3 PLAS-SCNC: 24 MMOL/L (ref 22–29)
EGFRCR SERPLBLD CKD-EPI 2021: 80 ML/MIN/1.73M2
EOSINOPHIL # BLD AUTO: 0.2 10E3/UL (ref 0–0.7)
EOSINOPHIL NFR BLD AUTO: 3 %
ERYTHROCYTE [DISTWIDTH] IN BLOOD BY AUTOMATED COUNT: 13.9 % (ref 10–15)
GLUCOSE SERPL-MCNC: 105 MG/DL (ref 70–99)
HCT VFR BLD AUTO: 40.2 % (ref 35–47)
HGB BLD-MCNC: 13.7 G/DL (ref 11.7–15.7)
IMM GRANULOCYTES # BLD: 0 10E3/UL
IMM GRANULOCYTES NFR BLD: 0 %
LDH SERPL L TO P-CCNC: 144 U/L (ref 0–250)
LYMPHOCYTES # BLD AUTO: 2.2 10E3/UL (ref 0.8–5.3)
LYMPHOCYTES NFR BLD AUTO: 41 %
MCH RBC QN AUTO: 30.7 PG (ref 26.5–33)
MCHC RBC AUTO-ENTMCNC: 34.1 G/DL (ref 31.5–36.5)
MCV RBC AUTO: 90 FL (ref 78–100)
MONOCYTES # BLD AUTO: 0.5 10E3/UL (ref 0–1.3)
MONOCYTES NFR BLD AUTO: 9 %
NEUTROPHILS # BLD AUTO: 2.5 10E3/UL (ref 1.6–8.3)
NEUTROPHILS NFR BLD AUTO: 46 %
NRBC # BLD AUTO: 0 10E3/UL
NRBC BLD AUTO-RTO: 0 /100
PLATELET # BLD AUTO: 204 10E3/UL (ref 150–450)
POTASSIUM SERPL-SCNC: 3.4 MMOL/L (ref 3.4–5.3)
PROT SERPL-MCNC: 7.1 G/DL (ref 6.4–8.3)
RBC # BLD AUTO: 4.46 10E6/UL (ref 3.8–5.2)
SODIUM SERPL-SCNC: 135 MMOL/L (ref 135–145)
WBC # BLD AUTO: 5.4 10E3/UL (ref 4–11)

## 2024-03-27 PROCEDURE — 82784 ASSAY IGA/IGD/IGG/IGM EACH: CPT | Mod: ZL

## 2024-03-27 PROCEDURE — 343N000001 HC RX 343: Performed by: NURSE PRACTITIONER

## 2024-03-27 PROCEDURE — A9552 F18 FDG: HCPCS | Performed by: NURSE PRACTITIONER

## 2024-03-27 PROCEDURE — 84165 PROTEIN E-PHORESIS SERUM: CPT | Mod: 26 | Performed by: PATHOLOGY

## 2024-03-27 PROCEDURE — 84165 PROTEIN E-PHORESIS SERUM: CPT | Mod: ZL | Performed by: PATHOLOGY

## 2024-03-27 PROCEDURE — 36415 COLL VENOUS BLD VENIPUNCTURE: CPT | Mod: ZL

## 2024-03-27 PROCEDURE — 86334 IMMUNOFIX E-PHORESIS SERUM: CPT | Mod: 26 | Performed by: PATHOLOGY

## 2024-03-27 PROCEDURE — 78816 PET IMAGE W/CT FULL BODY: CPT | Mod: PS,MG

## 2024-03-27 PROCEDURE — 84155 ASSAY OF PROTEIN SERUM: CPT | Mod: ZL,XU

## 2024-03-27 PROCEDURE — 86334 IMMUNOFIX E-PHORESIS SERUM: CPT | Mod: ZL | Performed by: PATHOLOGY

## 2024-03-27 PROCEDURE — 83615 LACTATE (LD) (LDH) ENZYME: CPT | Mod: ZL

## 2024-03-27 PROCEDURE — 83521 IG LIGHT CHAINS FREE EACH: CPT | Mod: ZL

## 2024-03-27 PROCEDURE — 85025 COMPLETE CBC W/AUTO DIFF WBC: CPT | Mod: ZL

## 2024-03-27 PROCEDURE — 82232 ASSAY OF BETA-2 PROTEIN: CPT | Mod: ZL

## 2024-03-27 PROCEDURE — 80053 COMPREHEN METABOLIC PANEL: CPT | Mod: ZL

## 2024-03-27 PROCEDURE — 85810 BLOOD VISCOSITY EXAMINATION: CPT | Mod: ZL

## 2024-03-27 RX ADMIN — FLUDEOXYGLUCOSE F-18 13.01 MILLICURIE: 500 INJECTION, SOLUTION INTRAVENOUS at 08:51

## 2024-03-28 DIAGNOSIS — H91.93 DECREASED HEARING OF BOTH EARS: Primary | ICD-10-CM

## 2024-03-29 LAB
ALBUMIN SERPL ELPH-MCNC: 4 G/DL (ref 3.7–5.1)
ALPHA1 GLOB SERPL ELPH-MCNC: 0.3 G/DL (ref 0.2–0.4)
ALPHA2 GLOB SERPL ELPH-MCNC: 0.8 G/DL (ref 0.5–0.9)
B-GLOBULIN SERPL ELPH-MCNC: 0.7 G/DL (ref 0.6–1)
B2 MICROGLOB TUMOR MARKER SER-MCNC: 2.6 MG/L
GAMMA GLOB SERPL ELPH-MCNC: 1 G/DL (ref 0.7–1.6)
IGA SERPL-MCNC: 23 MG/DL (ref 84–499)
IGG SERPL-MCNC: 1114 MG/DL (ref 610–1616)
IGM SERPL-MCNC: 35 MG/DL (ref 35–242)
KAPPA LC FREE SER-MCNC: 0.58 MG/DL (ref 0.33–1.94)
KAPPA LC FREE/LAMBDA FREE SER NEPH: 1.53 {RATIO} (ref 0.26–1.65)
LAMBDA LC FREE SERPL-MCNC: 0.38 MG/DL (ref 0.57–2.63)
LOCATION OF TASK: ABNORMAL
LOCATION OF TASK: NORMAL
M PROTEIN SERPL ELPH-MCNC: 0.8 G/DL
PROT PATTERN SERPL ELPH-IMP: ABNORMAL
PROT PATTERN SERPL IFE-IMP: NORMAL
TOTAL PROTEIN SERUM FOR ELP: 6.8 G/DL (ref 6.4–8.3)
VISC SER: 1.5 CP (ref 1.4–1.8)

## 2024-04-01 ENCOUNTER — ONCOLOGY VISIT (OUTPATIENT)
Dept: ONCOLOGY | Facility: OTHER | Age: 74
End: 2024-04-01
Attending: INTERNAL MEDICINE
Payer: MEDICARE

## 2024-04-01 ENCOUNTER — INFUSION THERAPY VISIT (OUTPATIENT)
Dept: INFUSION THERAPY | Facility: OTHER | Age: 74
End: 2024-04-01
Attending: INTERNAL MEDICINE
Payer: MEDICARE

## 2024-04-01 VITALS
HEART RATE: 71 BPM | BODY MASS INDEX: 30.81 KG/M2 | DIASTOLIC BLOOD PRESSURE: 82 MMHG | OXYGEN SATURATION: 96 % | RESPIRATION RATE: 18 BRPM | TEMPERATURE: 98.3 F | WEIGHT: 173.94 LBS | SYSTOLIC BLOOD PRESSURE: 128 MMHG

## 2024-04-01 DIAGNOSIS — C90.00 MULTIPLE MYELOMA NOT HAVING ACHIEVED REMISSION (H): Primary | ICD-10-CM

## 2024-04-01 PROCEDURE — 96401 CHEMO ANTI-NEOPL SQ/IM: CPT | Performed by: INTERNAL MEDICINE

## 2024-04-01 PROCEDURE — 99215 OFFICE O/P EST HI 40 MIN: CPT | Performed by: INTERNAL MEDICINE

## 2024-04-01 PROCEDURE — G0463 HOSPITAL OUTPT CLINIC VISIT: HCPCS | Mod: 25

## 2024-04-01 PROCEDURE — 96402 CHEMO HORMON ANTINEOPL SQ/IM: CPT | Performed by: INTERNAL MEDICINE

## 2024-04-01 PROCEDURE — G0463 HOSPITAL OUTPT CLINIC VISIT: HCPCS

## 2024-04-01 PROCEDURE — 250N000011 HC RX IP 250 OP 636: Mod: JZ | Performed by: INTERNAL MEDICINE

## 2024-04-01 PROCEDURE — 99417 PROLNG OP E/M EACH 15 MIN: CPT | Performed by: INTERNAL MEDICINE

## 2024-04-01 RX ORDER — DEXAMETHASONE 4 MG/1
20 TABLET ORAL ONCE
Status: CANCELLED | OUTPATIENT
Start: 2024-05-20

## 2024-04-01 RX ORDER — ALBUTEROL SULFATE 0.83 MG/ML
2.5 SOLUTION RESPIRATORY (INHALATION)
Status: CANCELLED | OUTPATIENT
Start: 2024-04-08

## 2024-04-01 RX ORDER — DIPHENHYDRAMINE HCL 50 MG
50 CAPSULE ORAL ONCE
Status: CANCELLED | OUTPATIENT
Start: 2024-04-15

## 2024-04-01 RX ORDER — DIPHENHYDRAMINE HCL 50 MG
50 CAPSULE ORAL
Status: CANCELLED | OUTPATIENT
Start: 2024-04-29

## 2024-04-01 RX ORDER — EPINEPHRINE 1 MG/ML
0.3 INJECTION, SOLUTION, CONCENTRATE INTRAVENOUS EVERY 5 MIN PRN
Status: CANCELLED | OUTPATIENT
Start: 2024-04-08

## 2024-04-01 RX ORDER — ALBUTEROL SULFATE 0.83 MG/ML
2.5 SOLUTION RESPIRATORY (INHALATION)
Status: CANCELLED | OUTPATIENT
Start: 2024-04-29

## 2024-04-01 RX ORDER — METHYLPREDNISOLONE SODIUM SUCCINATE 125 MG/2ML
125 INJECTION, POWDER, LYOPHILIZED, FOR SOLUTION INTRAMUSCULAR; INTRAVENOUS
Status: CANCELLED
Start: 2024-04-15

## 2024-04-01 RX ORDER — ACETAMINOPHEN 325 MG/1
650 TABLET ORAL
Status: CANCELLED | OUTPATIENT
Start: 2024-05-20

## 2024-04-01 RX ORDER — DIPHENHYDRAMINE HYDROCHLORIDE 50 MG/ML
50 INJECTION INTRAMUSCULAR; INTRAVENOUS
Status: CANCELLED
Start: 2024-04-29

## 2024-04-01 RX ORDER — ALBUTEROL SULFATE 90 UG/1
1-2 AEROSOL, METERED RESPIRATORY (INHALATION)
Status: CANCELLED
Start: 2024-04-01

## 2024-04-01 RX ORDER — ACETAMINOPHEN 325 MG/1
650 TABLET ORAL ONCE
Status: COMPLETED | OUTPATIENT
Start: 2024-04-01 | End: 2024-04-01

## 2024-04-01 RX ORDER — ACETAMINOPHEN 325 MG/1
650 TABLET ORAL
Status: CANCELLED | OUTPATIENT
Start: 2024-05-13

## 2024-04-01 RX ORDER — ACETAMINOPHEN 325 MG/1
650 TABLET ORAL ONCE
Status: CANCELLED | OUTPATIENT
Start: 2024-04-15

## 2024-04-01 RX ORDER — ALBUTEROL SULFATE 90 UG/1
1-2 AEROSOL, METERED RESPIRATORY (INHALATION)
Status: CANCELLED
Start: 2024-04-29

## 2024-04-01 RX ORDER — DIPHENHYDRAMINE HYDROCHLORIDE 50 MG/ML
50 INJECTION INTRAMUSCULAR; INTRAVENOUS
Status: CANCELLED
Start: 2024-05-06

## 2024-04-01 RX ORDER — ACETAMINOPHEN 325 MG/1
650 TABLET ORAL ONCE
Status: CANCELLED | OUTPATIENT
Start: 2024-04-08

## 2024-04-01 RX ORDER — MONTELUKAST SODIUM 10 MG/1
10 TABLET ORAL ONCE
Status: COMPLETED | OUTPATIENT
Start: 2024-04-01 | End: 2024-04-01

## 2024-04-01 RX ORDER — MONTELUKAST SODIUM 10 MG/1
10 TABLET ORAL ONCE
Status: CANCELLED | OUTPATIENT
Start: 2024-04-01

## 2024-04-01 RX ORDER — DIPHENHYDRAMINE HYDROCHLORIDE 50 MG/ML
50 INJECTION INTRAMUSCULAR; INTRAVENOUS
Status: CANCELLED
Start: 2024-04-15

## 2024-04-01 RX ORDER — METHYLPREDNISOLONE SODIUM SUCCINATE 125 MG/2ML
125 INJECTION, POWDER, LYOPHILIZED, FOR SOLUTION INTRAMUSCULAR; INTRAVENOUS
Status: CANCELLED
Start: 2024-04-29

## 2024-04-01 RX ORDER — ACETAMINOPHEN 325 MG/1
650 TABLET ORAL ONCE
Status: CANCELLED | OUTPATIENT
Start: 2024-04-01

## 2024-04-01 RX ORDER — METHYLPREDNISOLONE SODIUM SUCCINATE 125 MG/2ML
125 INJECTION, POWDER, LYOPHILIZED, FOR SOLUTION INTRAMUSCULAR; INTRAVENOUS
Status: CANCELLED
Start: 2024-05-13

## 2024-04-01 RX ORDER — LORAZEPAM 2 MG/ML
0.5 INJECTION INTRAMUSCULAR EVERY 4 HOURS PRN
Status: CANCELLED | OUTPATIENT
Start: 2024-04-29

## 2024-04-01 RX ORDER — EPINEPHRINE 1 MG/ML
0.3 INJECTION, SOLUTION, CONCENTRATE INTRAVENOUS EVERY 5 MIN PRN
Status: CANCELLED | OUTPATIENT
Start: 2024-05-13

## 2024-04-01 RX ORDER — DEXAMETHASONE 4 MG/1
20 TABLET ORAL ONCE
Status: CANCELLED | OUTPATIENT
Start: 2024-04-08

## 2024-04-01 RX ORDER — DIPHENHYDRAMINE HYDROCHLORIDE 50 MG/ML
50 INJECTION INTRAMUSCULAR; INTRAVENOUS
Status: CANCELLED
Start: 2024-05-13

## 2024-04-01 RX ORDER — LORAZEPAM 2 MG/ML
0.5 INJECTION INTRAMUSCULAR EVERY 4 HOURS PRN
Status: CANCELLED | OUTPATIENT
Start: 2024-05-06

## 2024-04-01 RX ORDER — METHYLPREDNISOLONE SODIUM SUCCINATE 125 MG/2ML
125 INJECTION, POWDER, LYOPHILIZED, FOR SOLUTION INTRAMUSCULAR; INTRAVENOUS
Status: CANCELLED
Start: 2024-04-08

## 2024-04-01 RX ORDER — DIPHENHYDRAMINE HCL 50 MG
50 CAPSULE ORAL ONCE
Status: COMPLETED | OUTPATIENT
Start: 2024-04-01 | End: 2024-04-01

## 2024-04-01 RX ORDER — DEXAMETHASONE 4 MG/1
20 TABLET ORAL ONCE
Status: CANCELLED | OUTPATIENT
Start: 2024-05-06

## 2024-04-01 RX ORDER — MONTELUKAST SODIUM 10 MG/1
10 TABLET ORAL ONCE
Status: CANCELLED | OUTPATIENT
Start: 2024-04-15

## 2024-04-01 RX ORDER — LORAZEPAM 2 MG/ML
0.5 INJECTION INTRAMUSCULAR EVERY 4 HOURS PRN
Status: CANCELLED | OUTPATIENT
Start: 2024-05-20

## 2024-04-01 RX ORDER — EPINEPHRINE 1 MG/ML
0.3 INJECTION, SOLUTION, CONCENTRATE INTRAVENOUS EVERY 5 MIN PRN
Status: CANCELLED | OUTPATIENT
Start: 2024-04-29

## 2024-04-01 RX ORDER — METHYLPREDNISOLONE SODIUM SUCCINATE 125 MG/2ML
125 INJECTION, POWDER, LYOPHILIZED, FOR SOLUTION INTRAMUSCULAR; INTRAVENOUS
Status: CANCELLED
Start: 2024-04-01

## 2024-04-01 RX ORDER — DIPHENHYDRAMINE HCL 50 MG
50 CAPSULE ORAL
Status: CANCELLED | OUTPATIENT
Start: 2024-05-13

## 2024-04-01 RX ORDER — MEPERIDINE HYDROCHLORIDE 25 MG/ML
25 INJECTION INTRAMUSCULAR; INTRAVENOUS; SUBCUTANEOUS EVERY 30 MIN PRN
Status: CANCELLED | OUTPATIENT
Start: 2024-05-13

## 2024-04-01 RX ORDER — LORAZEPAM 2 MG/ML
0.5 INJECTION INTRAMUSCULAR EVERY 4 HOURS PRN
Status: CANCELLED | OUTPATIENT
Start: 2024-04-08

## 2024-04-01 RX ORDER — MEPERIDINE HYDROCHLORIDE 25 MG/ML
25 INJECTION INTRAMUSCULAR; INTRAVENOUS; SUBCUTANEOUS EVERY 30 MIN PRN
Status: CANCELLED | OUTPATIENT
Start: 2024-04-29

## 2024-04-01 RX ORDER — EPINEPHRINE 1 MG/ML
0.3 INJECTION, SOLUTION, CONCENTRATE INTRAVENOUS EVERY 5 MIN PRN
Status: CANCELLED | OUTPATIENT
Start: 2024-04-15

## 2024-04-01 RX ORDER — ALBUTEROL SULFATE 90 UG/1
1-2 AEROSOL, METERED RESPIRATORY (INHALATION)
Status: CANCELLED
Start: 2024-05-06

## 2024-04-01 RX ORDER — ALBUTEROL SULFATE 90 UG/1
1-2 AEROSOL, METERED RESPIRATORY (INHALATION)
Status: CANCELLED
Start: 2024-04-08

## 2024-04-01 RX ORDER — PROCHLORPERAZINE MALEATE 10 MG
10 TABLET ORAL EVERY 6 HOURS PRN
Qty: 30 TABLET | Refills: 2 | Status: SHIPPED | OUTPATIENT
Start: 2024-04-01

## 2024-04-01 RX ORDER — EPINEPHRINE 1 MG/ML
0.3 INJECTION, SOLUTION, CONCENTRATE INTRAVENOUS EVERY 5 MIN PRN
Status: CANCELLED | OUTPATIENT
Start: 2024-04-01

## 2024-04-01 RX ORDER — DEXAMETHASONE 4 MG/1
20 TABLET ORAL ONCE
Status: COMPLETED | OUTPATIENT
Start: 2024-04-01 | End: 2024-04-01

## 2024-04-01 RX ORDER — ACETAMINOPHEN 325 MG/1
650 TABLET ORAL
Status: CANCELLED | OUTPATIENT
Start: 2024-05-06

## 2024-04-01 RX ORDER — DEXAMETHASONE 4 MG/1
20 TABLET ORAL ONCE
Status: CANCELLED | OUTPATIENT
Start: 2024-05-13

## 2024-04-01 RX ORDER — DIPHENHYDRAMINE HYDROCHLORIDE 50 MG/ML
50 INJECTION INTRAMUSCULAR; INTRAVENOUS
Status: CANCELLED
Start: 2024-05-20

## 2024-04-01 RX ORDER — DEXAMETHASONE 4 MG/1
20 TABLET ORAL ONCE
Status: CANCELLED | OUTPATIENT
Start: 2024-04-01

## 2024-04-01 RX ORDER — ALBUTEROL SULFATE 0.83 MG/ML
2.5 SOLUTION RESPIRATORY (INHALATION)
Status: CANCELLED | OUTPATIENT
Start: 2024-04-01

## 2024-04-01 RX ORDER — LORAZEPAM 2 MG/ML
0.5 INJECTION INTRAMUSCULAR EVERY 4 HOURS PRN
Status: CANCELLED | OUTPATIENT
Start: 2024-04-22

## 2024-04-01 RX ORDER — MEPERIDINE HYDROCHLORIDE 25 MG/ML
25 INJECTION INTRAMUSCULAR; INTRAVENOUS; SUBCUTANEOUS EVERY 30 MIN PRN
Status: CANCELLED | OUTPATIENT
Start: 2024-04-15

## 2024-04-01 RX ORDER — DEXAMETHASONE 4 MG/1
20 TABLET ORAL ONCE
Status: CANCELLED | OUTPATIENT
Start: 2024-04-15

## 2024-04-01 RX ORDER — MEPERIDINE HYDROCHLORIDE 25 MG/ML
25 INJECTION INTRAMUSCULAR; INTRAVENOUS; SUBCUTANEOUS EVERY 30 MIN PRN
Status: CANCELLED | OUTPATIENT
Start: 2024-04-22

## 2024-04-01 RX ORDER — MEPERIDINE HYDROCHLORIDE 25 MG/ML
25 INJECTION INTRAMUSCULAR; INTRAVENOUS; SUBCUTANEOUS EVERY 30 MIN PRN
Status: CANCELLED | OUTPATIENT
Start: 2024-04-08

## 2024-04-01 RX ORDER — ALBUTEROL SULFATE 90 UG/1
1-2 AEROSOL, METERED RESPIRATORY (INHALATION)
Status: CANCELLED
Start: 2024-04-15

## 2024-04-01 RX ORDER — ACETAMINOPHEN 325 MG/1
650 TABLET ORAL
Status: CANCELLED | OUTPATIENT
Start: 2024-04-29

## 2024-04-01 RX ORDER — DIPHENHYDRAMINE HYDROCHLORIDE 50 MG/ML
50 INJECTION INTRAMUSCULAR; INTRAVENOUS
Status: CANCELLED
Start: 2024-04-01

## 2024-04-01 RX ORDER — ALBUTEROL SULFATE 90 UG/1
1-2 AEROSOL, METERED RESPIRATORY (INHALATION)
Status: CANCELLED
Start: 2024-05-13

## 2024-04-01 RX ORDER — LORAZEPAM 2 MG/ML
0.5 INJECTION INTRAMUSCULAR EVERY 4 HOURS PRN
Status: CANCELLED | OUTPATIENT
Start: 2024-04-15

## 2024-04-01 RX ORDER — ALBUTEROL SULFATE 0.83 MG/ML
2.5 SOLUTION RESPIRATORY (INHALATION)
Status: CANCELLED | OUTPATIENT
Start: 2024-04-15

## 2024-04-01 RX ORDER — ALBUTEROL SULFATE 90 UG/1
1-2 AEROSOL, METERED RESPIRATORY (INHALATION)
Status: CANCELLED
Start: 2024-04-22

## 2024-04-01 RX ORDER — DIPHENHYDRAMINE HCL 50 MG
50 CAPSULE ORAL ONCE
Status: CANCELLED | OUTPATIENT
Start: 2024-04-08

## 2024-04-01 RX ORDER — ALBUTEROL SULFATE 0.83 MG/ML
2.5 SOLUTION RESPIRATORY (INHALATION)
Status: CANCELLED | OUTPATIENT
Start: 2024-05-20

## 2024-04-01 RX ORDER — ACETAMINOPHEN 325 MG/1
650 TABLET ORAL
Status: CANCELLED | OUTPATIENT
Start: 2024-04-22

## 2024-04-01 RX ORDER — MEPERIDINE HYDROCHLORIDE 25 MG/ML
25 INJECTION INTRAMUSCULAR; INTRAVENOUS; SUBCUTANEOUS EVERY 30 MIN PRN
Status: CANCELLED | OUTPATIENT
Start: 2024-04-01

## 2024-04-01 RX ORDER — DIPHENHYDRAMINE HCL 50 MG
50 CAPSULE ORAL
Status: CANCELLED | OUTPATIENT
Start: 2024-05-20

## 2024-04-01 RX ORDER — EPINEPHRINE 1 MG/ML
0.3 INJECTION, SOLUTION, CONCENTRATE INTRAVENOUS EVERY 5 MIN PRN
Status: CANCELLED | OUTPATIENT
Start: 2024-04-22

## 2024-04-01 RX ORDER — METHYLPREDNISOLONE SODIUM SUCCINATE 125 MG/2ML
125 INJECTION, POWDER, LYOPHILIZED, FOR SOLUTION INTRAMUSCULAR; INTRAVENOUS
Status: CANCELLED
Start: 2024-05-06

## 2024-04-01 RX ORDER — ALBUTEROL SULFATE 90 UG/1
1-2 AEROSOL, METERED RESPIRATORY (INHALATION)
Status: CANCELLED
Start: 2024-05-20

## 2024-04-01 RX ORDER — METHYLPREDNISOLONE SODIUM SUCCINATE 125 MG/2ML
125 INJECTION, POWDER, LYOPHILIZED, FOR SOLUTION INTRAMUSCULAR; INTRAVENOUS
Status: CANCELLED
Start: 2024-04-22

## 2024-04-01 RX ORDER — DEXAMETHASONE 4 MG/1
20 TABLET ORAL ONCE
Status: CANCELLED | OUTPATIENT
Start: 2024-04-22

## 2024-04-01 RX ORDER — LORAZEPAM 2 MG/ML
0.5 INJECTION INTRAMUSCULAR EVERY 4 HOURS PRN
Status: CANCELLED | OUTPATIENT
Start: 2024-04-01

## 2024-04-01 RX ORDER — LORAZEPAM 2 MG/ML
0.5 INJECTION INTRAMUSCULAR EVERY 4 HOURS PRN
Status: CANCELLED | OUTPATIENT
Start: 2024-05-13

## 2024-04-01 RX ORDER — MEPERIDINE HYDROCHLORIDE 25 MG/ML
25 INJECTION INTRAMUSCULAR; INTRAVENOUS; SUBCUTANEOUS EVERY 30 MIN PRN
Status: CANCELLED | OUTPATIENT
Start: 2024-05-06

## 2024-04-01 RX ORDER — METHYLPREDNISOLONE SODIUM SUCCINATE 125 MG/2ML
125 INJECTION, POWDER, LYOPHILIZED, FOR SOLUTION INTRAMUSCULAR; INTRAVENOUS
Status: CANCELLED
Start: 2024-05-20

## 2024-04-01 RX ORDER — ALBUTEROL SULFATE 0.83 MG/ML
2.5 SOLUTION RESPIRATORY (INHALATION)
Status: CANCELLED | OUTPATIENT
Start: 2024-04-22

## 2024-04-01 RX ORDER — DIPHENHYDRAMINE HCL 50 MG
50 CAPSULE ORAL
Status: CANCELLED | OUTPATIENT
Start: 2024-05-06

## 2024-04-01 RX ORDER — EPINEPHRINE 1 MG/ML
0.3 INJECTION, SOLUTION, CONCENTRATE INTRAVENOUS EVERY 5 MIN PRN
Status: CANCELLED | OUTPATIENT
Start: 2024-05-20

## 2024-04-01 RX ORDER — DIPHENHYDRAMINE HYDROCHLORIDE 50 MG/ML
50 INJECTION INTRAMUSCULAR; INTRAVENOUS
Status: CANCELLED
Start: 2024-04-22

## 2024-04-01 RX ORDER — DIPHENHYDRAMINE HCL 50 MG
50 CAPSULE ORAL ONCE
Status: CANCELLED | OUTPATIENT
Start: 2024-04-01

## 2024-04-01 RX ORDER — ALBUTEROL SULFATE 0.83 MG/ML
2.5 SOLUTION RESPIRATORY (INHALATION)
Status: CANCELLED | OUTPATIENT
Start: 2024-05-13

## 2024-04-01 RX ORDER — MONTELUKAST SODIUM 10 MG/1
10 TABLET ORAL ONCE
Status: CANCELLED | OUTPATIENT
Start: 2024-04-08

## 2024-04-01 RX ORDER — ALBUTEROL SULFATE 0.83 MG/ML
2.5 SOLUTION RESPIRATORY (INHALATION)
Status: CANCELLED | OUTPATIENT
Start: 2024-05-06

## 2024-04-01 RX ORDER — ACYCLOVIR 400 MG/1
400 TABLET ORAL 2 TIMES DAILY
Qty: 60 TABLET | Refills: 11 | Status: SHIPPED | OUTPATIENT
Start: 2024-04-01

## 2024-04-01 RX ORDER — DEXAMETHASONE 4 MG/1
20 TABLET ORAL ONCE
Status: CANCELLED | OUTPATIENT
Start: 2024-04-29

## 2024-04-01 RX ORDER — MEPERIDINE HYDROCHLORIDE 25 MG/ML
25 INJECTION INTRAMUSCULAR; INTRAVENOUS; SUBCUTANEOUS EVERY 30 MIN PRN
Status: CANCELLED | OUTPATIENT
Start: 2024-05-20

## 2024-04-01 RX ORDER — DIPHENHYDRAMINE HYDROCHLORIDE 50 MG/ML
50 INJECTION INTRAMUSCULAR; INTRAVENOUS
Status: CANCELLED
Start: 2024-04-08

## 2024-04-01 RX ORDER — DEXAMETHASONE 4 MG/1
TABLET ORAL
Qty: 60 TABLET | Refills: 1 | Status: SHIPPED | OUTPATIENT
Start: 2024-04-01 | End: 2024-06-17

## 2024-04-01 RX ORDER — DIPHENHYDRAMINE HCL 50 MG
50 CAPSULE ORAL
Status: CANCELLED | OUTPATIENT
Start: 2024-04-22

## 2024-04-01 RX ORDER — EPINEPHRINE 1 MG/ML
0.3 INJECTION, SOLUTION, CONCENTRATE INTRAVENOUS EVERY 5 MIN PRN
Status: CANCELLED | OUTPATIENT
Start: 2024-05-06

## 2024-04-01 RX ADMIN — MONTELUKAST SODIUM 10 MG: 10 TABLET ORAL at 11:17

## 2024-04-01 RX ADMIN — Medication 50 MG: at 11:18

## 2024-04-01 RX ADMIN — DEXAMETHASONE 20 MG: 4 TABLET ORAL at 11:17

## 2024-04-01 RX ADMIN — DARATUMUMAB AND HYALURONIDASE-FIHJ (HUMAN RECOMBINANT) 1800 MG: 1800; 30000 INJECTION SUBCUTANEOUS at 11:43

## 2024-04-01 RX ADMIN — ACETAMINOPHEN 650 MG: 325 TABLET ORAL at 11:17

## 2024-04-01 ASSESSMENT — PAIN SCALES - GENERAL: PAINLEVEL: NO PAIN (0)

## 2024-04-01 NOTE — PATIENT INSTRUCTIONS
Labs, Darzalex subcutaneous injection today (needs to be added to schedule)  Darzalex Day 8, Day 15, Day 22 with labs prior  Follow-up with Iram Parks, injection in 4 weeks

## 2024-04-01 NOTE — PROGRESS NOTES
Infusion Nursing Note:  Carmelita Watts presents today for daratumumab.    Patient seen by provider today: Yes: Dr Valencia   present during visit today: Not Applicable.    Note: N/A.      Intravenous Access:  No Intravenous access/labs at this visit.    Treatment Conditions:  Lab Results   Component Value Date    HGB 13.7 03/27/2024    WBC 5.4 03/27/2024    ANEU 1.0 (L) 01/03/2023    ANEUTAUTO 2.5 03/27/2024     03/27/2024        Lab Results   Component Value Date     03/27/2024    POTASSIUM 3.4 03/27/2024    CR 0.77 03/27/2024    PAYAL 9.0 03/27/2024    BILITOTAL 0.4 03/27/2024    ALBUMIN 4.1 03/27/2024    ALT 17 03/27/2024    AST 22 03/27/2024       Results reviewed, labs MET treatment parameters, ok to proceed with treatment.      Post Infusion Assessment:  Patient tolerated injection without incident.       Discharge Plan:   Discharge instructions reviewed with: Patient.      UZMA OCAMPO

## 2024-04-01 NOTE — NURSING NOTE
"Oncology Rooming Note    April 1, 2024 9:12 AM   Carmelita Watts is a 73 year old female who presents for:    Chief Complaint   Patient presents with    Oncology Clinic Visit     Follow up Multiple myeloma not having achieved remission      Initial Vitals: /82   Pulse 71   Temp 98.3  F (36.8  C) (Tympanic)   Resp 18   Wt 78.9 kg (173 lb 15.1 oz)   SpO2 96%   BMI 30.81 kg/m   Estimated body mass index is 30.81 kg/m  as calculated from the following:    Height as of 3/12/24: 1.6 m (5' 3\").    Weight as of this encounter: 78.9 kg (173 lb 15.1 oz). Body surface area is 1.87 meters squared.  No Pain (0) Comment:   No LMP recorded. Patient is postmenopausal.  Allergies reviewed: Yes  Medications reviewed: Yes    Medications: Medication refills not needed today.  Pharmacy name entered into [facility]:    [id number] - [place], [place] - [address]  AT [facility] [address] [facility] [address]  [place] [facility] - [place], [place] - [address] [facility] - [place] - [address] [facility]    Frailty Screening:   Is the patient here for a new oncology consult visit in cancer care? 2. No      Clinical concerns: none       Sarahi Esqueda LPN             "

## 2024-04-02 ENCOUNTER — ALLIED HEALTH/NURSE VISIT (OUTPATIENT)
Dept: AUDIOLOGY | Facility: OTHER | Age: 74
End: 2024-04-02
Attending: AUDIOLOGIST
Payer: MEDICARE

## 2024-04-02 ENCOUNTER — OFFICE VISIT (OUTPATIENT)
Dept: AUDIOLOGY | Facility: OTHER | Age: 74
End: 2024-04-02
Attending: AUDIOLOGIST
Payer: MEDICARE

## 2024-04-02 ENCOUNTER — TELEPHONE (OUTPATIENT)
Dept: ONCOLOGY | Facility: OTHER | Age: 74
End: 2024-04-02

## 2024-04-02 ENCOUNTER — TELEPHONE (OUTPATIENT)
Dept: ONCOLOGY | Facility: OTHER | Age: 74
End: 2024-04-02
Payer: COMMERCIAL

## 2024-04-02 DIAGNOSIS — H91.93 DECREASED HEARING OF BOTH EARS: Primary | ICD-10-CM

## 2024-04-02 DIAGNOSIS — H69.93 DYSFUNCTION OF EUSTACHIAN TUBE, BILATERAL: ICD-10-CM

## 2024-04-02 DIAGNOSIS — H90.6 MIXED CONDUCTIVE AND SENSORINEURAL HEARING LOSS OF BOTH EARS: Primary | ICD-10-CM

## 2024-04-02 DIAGNOSIS — H91.93 DECREASED HEARING OF BOTH EARS: ICD-10-CM

## 2024-04-02 PROCEDURE — V5299 HEARING SERVICE: HCPCS

## 2024-04-02 PROCEDURE — 92557 COMPREHENSIVE HEARING TEST: CPT | Performed by: AUDIOLOGIST

## 2024-04-02 PROCEDURE — 92567 TYMPANOMETRY: CPT | Performed by: AUDIOLOGIST

## 2024-04-02 NOTE — TELEPHONE ENCOUNTER
PA Initiation    Medication: POMALYST 1 MG PO CAPS  Ref.# [id number]  Insurance Company: [employer] - Phone [phone] Fax [phone]  Pharmacy Filling the Rx: [facility] - [place], [place] - [address]  Filling Pharmacy Phone:    Filling Pharmacy Fax:    Start Date: 4/2/2024

## 2024-04-02 NOTE — PROGRESS NOTES
[facility] Hematology and Oncology Progress Note    Patient: Carmelita Watts  MRN: [id number]  Date of Service: Apr 1, 2024         Reason for Visit    Chief Complaint   Patient presents with    Oncology Clinic Visit     Follow up Multiple myeloma not having achieved remission        ECOG Performance Status: 1        Encounter Diagnoses:    IgA multiple myeloma with biochemical recurrence      History of Present Illness    Ms. Carmelita Watts returns in follow-up of IgA multiple myeloma with biochemical recurrence.  For details of history see previous notes.The patient had been initiated on the CyBorD regimen. After she had progressed on Velcade and Decadron, she was started on CyBorD with Velcade given at a dose of 1.5 mg/m2 weekly on days 1, 4, 8, 15 and 22 of a 28-day regimen. Cytoxan was given at a dose 140 mg/m2 weekly or 200 mg weekly on days 1, 4, 8, 15, and 22. She received reduced doses of Cytoxan based on immunosuppressive state. Also was continued on Zovirax prophylaxis. Dexamethasone was given 40 mg weekly on days 1, 4, 8, 15 and 22. After 1 cycle, her M spike had dropped as well as her IgG level. The patient completed 2 cycles CyBorD with Cytoxan being dosed reduced to 150 mg every 7 days. Nonetheless, her M spike dropped from 6.2 down to 1.2. She completed 35 cycles and was followed by Bonita Quintana, nurse practitioner. Subsequently, M spike remained 1. It was elected to initiate Darzalex or Faspro 1800 mg subcutaneously per protocol. Subsequently, on 03/23/2021, her M spike had increased from 1 to 1.2 and it was decided to add Velcade and dexamethasone to the plan. Started on Darzalex 1800 mg subcutaneously as per protocol and Velcade 1.3 mg/m2, dexamethasone given on days 1, 4, 8 and 11 every 21 days. Zometa was given 4 mg every 3 months. M-spike did drop after cycle 9 to 0.6 and after cycle 12 it dropped to 0.4. She received a total of 26 cycles of daratumumab, Velcade and dex and slight  drop to 0.3. In 04/2022, it was decided to put the patient on treatment holiday. She received Evusheld on 05/27/2022 while on treatment holiday. She was recently seen by Nida Eastman. It was noted that her M spike had increased from 0.4 to 0.8 within 2 months. Given the fact that this was evidence of biochemical recurrence, we wanted to rule out end-organ damage by obtaining a PET scan. This was done on 08/02/2022 and it was read as a negative study. There was no suspicious hypermetabolism present. There was no evidence of mass or lymphadenopathy. No concerning hypermetabolism was present. There was a well-defined region with osteolysis involving the left anterior vertebral body of L3.Given the fact she only had a biochemical recurrence elected to start her on single agent daratumumab subcutaneously at dose 1800 mg subcu on days 1, day 8, day 15, day 22 for the first 2 cycles and then transition to daratumumab subcutaneously on days 1 and 15 of a 28-day cycleDid initially respond and dropped from 0.8-0.2.  Cycle 7 was transition to daratumumab subcutaneously given on day 1 of a 28-day cycle.  At that point her M spike is only increased from 0.2-0.8.  She was seen by Nida Hebert nurse practitioner and we recommended PET scan for further evaluation.  PET scan was performed on March 27, 2024 and the findings were that there was a lytic lesion with well-defined sclerotic margins involving the right side of the L3 vertebral body there was no associated hypermetabolism there was no evidence of other endorgan damage.  Nonetheless she has developed biochemical recurrence despite single agent daratumumab.  She states that she has some fatigue and tries to exercise but is concerned about her weight gain.  Her neuropathy is improved overall.  She denies any specific areas of bone pain.  There are some fatigue.  She denies any shortness of breath cough or hemoptysis.  She denies any hematuria or urinary frequency.  She  denies any change in bowel habits, melena, hematochezia or hematemesis.      ______________________________________________________________________________    Past History    Past Medical History:   Diagnosis Date    Arthritis     Cyst of breast     Depressive disorder     Diabetes mellitus, type 2 (H) 01/18/2021    Essential hypertension 10/01/2015    Major depressive disorder, recurrent episode, mild (H24) 10/01/2015    Mixed hyperlipidemia 10/01/2015    Multiple myeloma not having achieved remission (H) 06/24/2019    Other specified disorders of bladder 07/09/2012    Irritable Bladder    Seasonal allergies 10/01/2015    Unspecified essential hypertension 03/19/2007    Unspecified sinusitis (chronic) 09/05/2007       Past Surgical History:   Procedure Laterality Date    APPENDECTOMY      BONE MARROW BIOPSY, BONE SPECIMEN, NEEDLE/TROCAR N/A 06/18/2019    Procedure: BONE MARROW BIOPSY;  Surgeon: Maciej Sanz MD;  Location: [facility] OR    CHOLECYSTECTOMY      COLONOSCOPY  07/18/2006    repeat 10 years    COLONOSCOPY N/A 12/30/2016    Procedure: COLONOSCOPY;  Surgeon: Bhaskar Franklin DO;  Location: [facility] OR    PUNC/ASPIR BREAST CYST Left 1995    benign    SINUS SURGERY      TUBAL STERILIZATION             Review of systems.  CNS: There are no headaches, no blurred vision, no change in mental status,   ENT: There is no hearing loss.  Respiratory: There is no dyspnea, cough or hemoptysis  Cardiac: There is no chest pain, orthopnea, PND, or ankle edema.  GI: There is no bright red blood per rectum, no hematemesis, no reflux, no diarrhea or constipation  Musculoskeletal: Occasional low back pain  : There is no urinary frequency, hematuria.  Constitutional: There is no fevers, night sweats, weight loss.  Endocrine: Mild to moderate fatigue  Neuro: There is no tingling or numbness in the hands or feet.  Hematologic: There is no gingival bleeding, epistaxis, or easy bruisability.  Dermatologic: There is no skin rash.  A  14 point review of systems is otherwise negative.          Physical Exam    /82   Pulse 71   Temp 98.3  F (36.8  C) (Tympanic)   Resp 18   Wt 78.9 kg (173 lb 15.1 oz)   SpO2 96%   BMI 30.81 kg/m        GENERAL: Alert and oriented to time place and person. Seated comfortably. In no distress.    HEAD: Atraumatic and normocephalic.    EYES: EVAN, EOMI.  No pallor.  No icterus.    Oral cavity: no mucosal lesion or tonsillar enlargement.    NECK: supple. JVP normal.  No thyroid enlargement.    LYMPH NODES: There are no palpable cervical, supraclavicular, axillary, or inguinal nodes.    LUNGS: clear to auscultation bilaterally.  Resonant to percussion throughout bilaterally.  Symmetrical breath movements bilaterally.    HEART: S1 and S2 are heard. Regular rate and rhythm.  No murmur or gallop or rub heard.  No peripheral edema.    ABDOMEN: Soft. Not tender. Not distended.  No palpable hepatomegaly or splenomegaly.  No other mass palpable.  Bowel sounds heard.    EXTREMITIES: There is no ankle edema.  SKIN: no rash, or bruising or purpura.    NEURO: Grossly non-focal.    Lab Results    Recent Results (from the past 240 hour(s))   Beta 2 microglobulin    Collection Time: 03/27/24 10:20 AM   Result Value Ref Range    Beta-2-Microglobulin 2.6 (H) <2.3 mg/L   Comprehensive metabolic panel    Collection Time: 03/27/24 10:20 AM   Result Value Ref Range    Sodium 135 135 - 145 mmol/L    Potassium 3.4 3.4 - 5.3 mmol/L    Carbon Dioxide (CO2) 24 22 - 29 mmol/L    Anion Gap 10 7 - 15 mmol/L    Urea Nitrogen 16.1 8.0 - 23.0 mg/dL    Creatinine 0.77 0.51 - 0.95 mg/dL    GFR Estimate 80 >60 mL/min/1.73m2    Calcium 9.0 8.8 - 10.2 mg/dL    Chloride 101 98 - 107 mmol/L    Glucose 105 (H) 70 - 99 mg/dL    Alkaline Phosphatase 136 40 - 150 U/L    AST 22 0 - 45 U/L    ALT 17 0 - 50 U/L    Protein Total 7.1 6.4 - 8.3 g/dL    Albumin 4.1 3.5 - 5.2 g/dL    Bilirubin Total 0.4 <=1.2 mg/dL   Immunoglobulins A G and M    Collection  Time: 03/27/24 10:20 AM   Result Value Ref Range    Immunoglobulin G 1,114 610 - 1,616 mg/dL    Immunoglobulin A 23 (L) 84 - 499 mg/dL    Immunoglobulin M 35 35 - 242 mg/dL   Kappa and lambda light chain    Collection Time: 03/27/24 10:20 AM   Result Value Ref Range    Kappa Free Light Chains 0.58 0.33 - 1.94 mg/dL    Lambda Free Light Chains 0.38 (L) 0.57 - 2.63 mg/dL    Kappa /Lambda Ratio 1.53 0.26 - 1.65   Lactate Dehydrogenase    Collection Time: 03/27/24 10:20 AM   Result Value Ref Range    Lactate Dehydrogenase 144 0 - 250 U/L   Macroglobulins    Collection Time: 03/27/24 10:20 AM   Result Value Ref Range    Viscosity Index 1.5 1.4 - 1.8   Protein Immunofixation Serum    Collection Time: 03/27/24 10:20 AM   Result Value Ref Range    Immunofixation ELP       Monoclonal IgG immunoglobulin of kappa light chain type. Monoclonal antibody therapeutics (e.g. Daratumumab) may appear as monoclonal proteins on serum electrophoresis and immunofixation, but almost always as only very small IgG kappa monoclonals. Nevertheless, results should be interpreted with caution if the patient is receiving monoclonal antibody therapy. Pathologic significance requires clinical correlation.  BIA Izaguirre M.D., Ph.D., Pathologist ()    Signout Location if Remote [facility]    CBC with platelets and differential    Collection Time: 03/27/24 10:20 AM   Result Value Ref Range    WBC Count 5.4 4.0 - 11.0 10e3/uL    RBC Count 4.46 3.80 - 5.20 10e6/uL    Hemoglobin 13.7 11.7 - 15.7 g/dL    Hematocrit 40.2 35.0 - 47.0 %    MCV 90 78 - 100 fL    MCH 30.7 26.5 - 33.0 pg    MCHC 34.1 31.5 - 36.5 g/dL    RDW 13.9 10.0 - 15.0 %    Platelet Count 204 150 - 450 10e3/uL    % Neutrophils 46 %    % Lymphocytes 41 %    % Monocytes 9 %    % Eosinophils 3 %    % Basophils 0 %    % Immature Granulocytes 0 %    NRBCs per 100 WBC 0 <1 /100    Absolute Neutrophils 2.5 1.6 - 8.3 10e3/uL    Absolute Lymphocytes 2.2 0.8 - 5.3 10e3/uL    Absolute  Monocytes 0.5 0.0 - 1.3 10e3/uL    Absolute Eosinophils 0.2 0.0 - 0.7 10e3/uL    Absolute Basophils 0.0 0.0 - 0.2 10e3/uL    Absolute Immature Granulocytes 0.0 <=0.4 10e3/uL    Absolute NRBCs 0.0 10e3/uL   Protein Electrophoresis Serum with Reflex    Collection Time: 03/27/24 10:20 AM   Result Value Ref Range    Albumin 4.0 3.7 - 5.1 g/dL    Alpha 1 0.3 0.2 - 0.4 g/dL    Alpha 2 0.8 0.5 - 0.9 g/dL    Beta Globulin 0.7 0.6 - 1.0 g/dL    Gamma Globulin 1.0 0.7 - 1.6 g/dL    Monoclonal Peak 0.8 (H) <=0.0 g/dL    ELP Interpretation       Monoclonal protein (about 0.8 g/dL) seen in the gamma fraction. See immunofixation report on same specimen. Pathologic significance requires clinical correlation. BIA Izaguirre M.D., Ph.D., Pathologist ().    Signout Location if Remote [facility]    Total Protein, Serum for ELP    Collection Time: 03/27/24 10:20 AM   Result Value Ref Range    Total Protein Serum for ELP 6.8 6.4 - 8.3 g/dL       Imaging    PET Oncology Whole Body    Result Date: 3/27/2024  PET ONCOLOGY WHOLE BODY HISTORY: 73 yearsFemale multiple myeloma, rising mspike, previous lytic lesion at L3; Multiple myeloma not having achieved remission (H) TECHNIQUE: 13.01 millicuries FDG was injected intravenously. A combined PET/CT scan was performed. CT imaging was performed without contrast for localization purposes only. COMPARISON: 8/2/2023 FINDINGS: Neck:No suspicious hypermetabolism is present CHEST:No suspicious hypermetabolism is present Abdomen/pelvis:No suspicious hypermetabolism is present. As seen previously, there is a lytic lesion with well-defined sclerotic margins involving the right side of the L3 vertebral body. There is no associated hypermetabolism.     IMPRESSION: Negative study. No evidence of mass or lymphadenopathy. No concerning hypermetabolism is present. ADAM CARRILLO MD   SYSTEM ID:  [id number]    MA Screen Bilateral w/Raphael    Result Date: 3/21/2024  EXAM: MA SCREENING BILATERAL W/ RAPHAEL,  3/21/2024 12:16 PM COMPARISONS: 8/15/2023 HISTORY: Encounter for screening mammogram for malignant neoplasm of breast FINDINGS: No new dominant masses or malignant calcifications are noted BREAST DENSITY: The breasts are heterogeneously dense which may obscure small masses.     IMPRESSION: BI-RADS CATEGORY: 2 - Benign. RECOMMENDED FOLLOW-UP: Routine yearly mammography beginning at age 40 or as discussed with your provider. The images were reviewed by R2 computer aided detection. ROSEMARIE NAPOLES MD   SYSTEM ID:  [id number]      Assessment and Plan: #1: Stage II IgA kappa multiple myeloma with 80% involvement of the bone marrow standard risk cytogenetics:  Status post Velcade/Decadron.  Patient refused stem cell transplant evaluation in consultation at the .  Revlimid could not be added due to the fact she cannot afford it.  Patient initial response and then developed rising M spike status post treatment with CyBorD regimen in October 2019 with M spike dropping from 6.2 down to 1.2 and subsequent biochemical progression no evidence of endorgan damage initiated on single agent daratumumab subcutaneously with evidence of biochemical progression after cycle 7 daratumumab.  There was no  evidence of hypercalcemia, renal failure, anemia or significant PET positive bony lesions.  Nonetheless she has not responded adequately to daratumumab and would like to add pomalidomide and dexamethasone we would favor reinitiating daratumumab subcutaneously given on day 1 ,8,15,22 with the addition of Pomalyst 1 mg p.o. daily on days 1 through 21 to begin with cycle 2 she will also receive dexamethasone 20 mg p.o. on days 1 ,8,15,22 will otherwise monitor myeloma labs monthly and assess response otherwise we will continue monthly evaluations Nida Murdock.CNP.  Time spent: 77 minutes was spent in this total patient visit:We Spent time reviewing multiple physician /provider notes, reviewing imaging results with the patient reviewing  lab results with patient performing history and physical, documenting history /physical, and ordering daratumumab/Pomalyst and Decadron.and labs.  Cancer Staging   No matching staging information was found for the patient.        Signed by: Elías Walker MD    CC: VENKATESH Trevino

## 2024-04-02 NOTE — TELEPHONE ENCOUNTER
Which patient identification number the  would like to continue to use. Please call.    lEida Hutchison on 4/2/2024 at 3:17 PM

## 2024-04-02 NOTE — PROGRESS NOTES
HEARING AID CHECK    BACKGROUND:  Carmelita Watts, a 73 year old female, was seen today for an hearing aid check.  Ms. Watts wears Binaural Oticon More 2 miniRITE R hearing aids.  Ms. Watts reported no concerns.    FINDINGS:  Visual inspection and cleaning provided.   C-stop changed with new. 8mm DV domes changed with new. Battery was tested and good. Good listening check.    Reviewed cleaning, troubleshooting, and preventative care with patient. Any cleaning tools used were provided as customer courtesy.    Services performed and warranty reviewed with patient.    PLAN:  Based on patient report, no audiology appointment for adjustments needed.Ms. Watts will return to clinic in 12 months, sooner if needed. Patient had no further questions and reports satisfaction.         Nila Arteaga  Audiology Assistant  [facility]  [phone]

## 2024-04-02 NOTE — TELEPHONE ENCOUNTER
ILYA APPROVED    Medication: POMALYST 1 MG PO CAPS  Amount: $  12,000  [facility] Name: [facility] Phone:  [phone]  [facility] Fax:   Member ID: [id number]  BIN: [id number]  PCN: [id number]  Group: [id number]  Foundation Effective Date: 3/3/2024  Foundation Expiration Date: 3/2/2025  Additional Information: diagnosis verification for required, filled out and sent to Dr. Walker to sign  Patient Notified: yes

## 2024-04-02 NOTE — TELEPHONE ENCOUNTER
Prior Authorization Approval    Medication: POMALYST 1 MG PO CAPS  Authorization Effective Date: 1/1/2024  Authorization Expiration Date: 4/2/2025  Approved Dose/Quantity: 21/28 days  Reference #: [id number]   Insurance Company: [employer] - Phone [phone] Fax [phone]  Expected CoPay: $ 3,233.8  CoPay Card Available:      Financial Assistance Needed: will check with patient  Which Pharmacy is filling the prescription: [facility] - [place], [place] - [address]  Pharmacy Notified: yes  Patient Notified: yes

## 2024-04-02 NOTE — PROGRESS NOTES
Audiology Evaluation Completed. Please refer SCANNED AUDIOGRAM and/or TYMPANOGRAM for BACKGROUND, RESULTS, RECOMMENDATIONS.      Bia BULLOCK, [facility]  Audiologist #[id number]

## 2024-04-03 NOTE — TELEPHONE ENCOUNTER
"Esperanza from ([employer]) [employer] answered this call and has no idea what the message was about, other than it may be someone from the pharmacy looking for assistance with a new medication.    This patient is a [facility] patient.  Dr Walker stated that he \"already did the [employer]S\" thing.    Nery Hopson CMA([facility])..................4/3/2024   9:49 AM   "

## 2024-04-03 NOTE — TELEPHONE ENCOUNTER
According to what I'm reading it looks like Dr Walker completed his part, and our RNCC stated that this REMS program should've sent the provider and patient a survey once completed theres no more for us to do.

## 2024-04-08 ENCOUNTER — INFUSION THERAPY VISIT (OUTPATIENT)
Dept: INFUSION THERAPY | Facility: OTHER | Age: 74
End: 2024-04-08
Attending: INTERNAL MEDICINE
Payer: MEDICARE

## 2024-04-08 VITALS
HEIGHT: 63 IN | SYSTOLIC BLOOD PRESSURE: 102 MMHG | DIASTOLIC BLOOD PRESSURE: 68 MMHG | BODY MASS INDEX: 31.01 KG/M2 | HEART RATE: 91 BPM | WEIGHT: 175 LBS | OXYGEN SATURATION: 94 % | TEMPERATURE: 98.1 F

## 2024-04-08 DIAGNOSIS — C90.00 MULTIPLE MYELOMA NOT HAVING ACHIEVED REMISSION (H): Primary | ICD-10-CM

## 2024-04-08 LAB
BASOPHILS # BLD AUTO: 0 10E3/UL (ref 0–0.2)
BASOPHILS NFR BLD AUTO: 0 %
EOSINOPHIL # BLD AUTO: 0.3 10E3/UL (ref 0–0.7)
EOSINOPHIL NFR BLD AUTO: 5 %
ERYTHROCYTE [DISTWIDTH] IN BLOOD BY AUTOMATED COUNT: 14.2 % (ref 10–15)
HCT VFR BLD AUTO: 40.9 % (ref 35–47)
HGB BLD-MCNC: 14 G/DL (ref 11.7–15.7)
IMM GRANULOCYTES # BLD: 0 10E3/UL
IMM GRANULOCYTES NFR BLD: 1 %
LYMPHOCYTES # BLD AUTO: 2.3 10E3/UL (ref 0.8–5.3)
LYMPHOCYTES NFR BLD AUTO: 34 %
MCH RBC QN AUTO: 31 PG (ref 26.5–33)
MCHC RBC AUTO-ENTMCNC: 34.2 G/DL (ref 31.5–36.5)
MCV RBC AUTO: 91 FL (ref 78–100)
MONOCYTES # BLD AUTO: 0.5 10E3/UL (ref 0–1.3)
MONOCYTES NFR BLD AUTO: 7 %
NEUTROPHILS # BLD AUTO: 3.5 10E3/UL (ref 1.6–8.3)
NEUTROPHILS NFR BLD AUTO: 53 %
NRBC # BLD AUTO: 0 10E3/UL
NRBC BLD AUTO-RTO: 0 /100
PLATELET # BLD AUTO: 181 10E3/UL (ref 150–450)
RBC # BLD AUTO: 4.51 10E6/UL (ref 3.8–5.2)
WBC # BLD AUTO: 6.7 10E3/UL (ref 4–11)

## 2024-04-08 PROCEDURE — 85025 COMPLETE CBC W/AUTO DIFF WBC: CPT | Mod: ZL | Performed by: INTERNAL MEDICINE

## 2024-04-08 PROCEDURE — 36415 COLL VENOUS BLD VENIPUNCTURE: CPT | Mod: ZL | Performed by: INTERNAL MEDICINE

## 2024-04-08 PROCEDURE — 96402 CHEMO HORMON ANTINEOPL SQ/IM: CPT

## 2024-04-08 PROCEDURE — 250N000011 HC RX IP 250 OP 636: Mod: JZ | Performed by: INTERNAL MEDICINE

## 2024-04-08 RX ORDER — MONTELUKAST SODIUM 10 MG/1
10 TABLET ORAL ONCE
Status: COMPLETED | OUTPATIENT
Start: 2024-04-08 | End: 2024-04-08

## 2024-04-08 RX ORDER — DIPHENHYDRAMINE HCL 50 MG
50 CAPSULE ORAL ONCE
Status: COMPLETED | OUTPATIENT
Start: 2024-04-08 | End: 2024-04-08

## 2024-04-08 RX ORDER — DEXAMETHASONE 4 MG/1
20 TABLET ORAL ONCE
Status: COMPLETED | OUTPATIENT
Start: 2024-04-08 | End: 2024-04-08

## 2024-04-08 RX ORDER — ACETAMINOPHEN 325 MG/1
650 TABLET ORAL ONCE
Status: COMPLETED | OUTPATIENT
Start: 2024-04-08 | End: 2024-04-08

## 2024-04-08 RX ADMIN — MONTELUKAST SODIUM 10 MG: 10 TABLET ORAL at 12:03

## 2024-04-08 RX ADMIN — ACETAMINOPHEN 650 MG: 325 TABLET ORAL at 12:03

## 2024-04-08 RX ADMIN — Medication 50 MG: at 12:03

## 2024-04-08 RX ADMIN — DEXAMETHASONE 20 MG: 4 TABLET ORAL at 12:03

## 2024-04-08 RX ADMIN — DARATUMUMAB AND HYALURONIDASE-FIHJ (HUMAN RECOMBINANT) 1800 MG: 1800; 30000 INJECTION SUBCUTANEOUS at 12:45

## 2024-04-08 NOTE — PROGRESS NOTES
Infusion Nursing Note:  Carmelita Watts presents today for Faspro.    Patient seen by provider today: No   present during visit today: Not Applicable.    Note: N/A.    Intravenous Access:  Labs drawn without difficulty by Michelle PAGE    Treatment Conditions:  Results reviewed, labs MET treatment parameters, ok to proceed with treatment.    Component      Latest Ref Rng 4/8/2024  11:36 AM   WBC      4.0 - 11.0 10e3/uL 6.7    RBC Count      3.80 - 5.20 10e6/uL 4.51    Hemoglobin      11.7 - 15.7 g/dL 14.0    Hematocrit      35.0 - 47.0 % 40.9    MCV      78 - 100 fL 91    MCH      26.5 - 33.0 pg 31.0    MCHC      31.5 - 36.5 g/dL 34.2    RDW      10.0 - 15.0 % 14.2    Platelet Count      150 - 450 10e3/uL 181    % Neutrophils      % 53    % Lymphocytes      % 34    % Monocytes      % 7    % Eosinophils      % 5    % Basophils      % 0    % Immature Granulocytes      % 1    NRBCs per 100 WBC      <1 /100 0    Absolute Neutrophils      1.6 - 8.3 10e3/uL 3.5    Absolute Lymphocytes      0.8 - 5.3 10e3/uL 2.3    Absolute Monocytes      0.0 - 1.3 10e3/uL 0.5    Absolute Eosinophils      0.0 - 0.7 10e3/uL 0.3    Absolute Basophils      0.0 - 0.2 10e3/uL 0.0    Absolute Immature Granulocytes      <=0.4 10e3/uL 0.0    Absolute NRBCs      10e3/uL 0.0          Post Infusion Assessment:  Patient tolerated injection without incident.     The following medication was given:     MEDICATION: Darzalex Faspro  ROUTE: SQ  SITE: LUQ - Abd  DOSE: 1800mg    Discharge Plan:   AVS to patient via [facility].  Patient will return 4/15/24 for next appointment.   Patient discharged in stable condition accompanied by: self.  Departure Mode: Ambulatory.

## 2024-04-10 ENCOUNTER — DOCUMENTATION ONLY (OUTPATIENT)
Dept: ONCOLOGY | Facility: OTHER | Age: 74
End: 2024-04-10

## 2024-04-10 NOTE — NURSING NOTE
"Chief Complaint   Patient presents with     Trauma     Cat       Initial /72 (BP Location: Left arm, Patient Position: Chair, Cuff Size: Adult Regular)   Pulse 87   Temp 98.9  F (37.2  C) (Tympanic)   Resp 18   Wt 74.3 kg (163 lb 12.8 oz)   SpO2 97%   BMI 29.02 kg/m   Estimated body mass index is 29.02 kg/m  as calculated from the following:    Height as of 10/15/21: 1.6 m (5' 2.99\").    Weight as of this encounter: 74.3 kg (163 lb 12.8 oz).  Medication Reconciliation: complete  Binta Bañuelos LPN  " Reviewed pre admission testing (PAT) lab results with patient and provided instructions to start Mupirocin prescription that was sent to patients pharmacy, BID x 5 days in both nostrils; patient verbalized understanding.

## 2024-04-10 NOTE — PROGRESS NOTES
[facility] Pharmacy Chemotherapy Consult    The inpatient pharmacist has been consulted to review the patient's chart for  the following: any significant drug interactions between home medications, new take home medications, pre-medications, PRN medications, chemotherapy agents, and chemotherapy regimen.     Current Outpatient Medications   Medication Sig Dispense Refill    acyclovir (ZOVIRAX) 400 MG tablet Take 1 tablet (400 mg) by mouth 2 times daily Start 1 week prior to daratumumab and continue for 3 months after daratumumab treatment complete. 60 tablet 11    aspirin (ASA) 81 MG chewable tablet Take 81 mg by mouth daily      atorvastatin (LIPITOR) 20 MG tablet Take 1 tablet (20 mg) by mouth daily 90 tablet 3    dexAMETHasone (DECADRON) 4 MG tablet Take 20 mg (5 tablets) weekly while on treatment. Take Day 1, Day 8, Day 15, Day 22 of treatment. 60 tablet 1    diphenhydrAMINE (BENADRYL) 25 MG capsule Take 25 mg by mouth daily      fluticasone (FLONASE) 50 MCG/ACT nasal spray SPRAY 2 SPRAYS INTO BOTH NOSTRILS DAILY 48 mL 0    L-Lysine HCl 500 MG CAPS Take by mouth daily      losartan (COZAAR) 50 MG tablet TAKE 1 TABLET BY MOUTH DAILY 90 tablet 3    prochlorperazine (COMPAZINE) 10 MG tablet Take 1 tablet (10 mg) by mouth every 6 hours as needed for nausea or vomiting 30 tablet 2    sertraline (ZOLOFT) 50 MG tablet TAKE 1/2 TABLET BY MOUTH DAILY 30 tablet 4     No current facility-administered medications for this visit.   Take home medication(s): prochlorperazine, acyclovir    Pre-Treat(s):       PRN medication(s):     Chemotherapy agent(s):       Cancer Being Treated: Multiple Myeloma    Difference in Regimen Ordered vs. Standard Regimen: Take-home treatment protocol lists aspirin 325 mg daily, but patient takes aspirin 81 mg daily at home. Unsure if this discrepancy is intentional or not.      The following interactions were found and will require patient education and/or provider assessment:     Drug-Drug  interactions:   Aspirin & Dexamethasone: aspirin may enhance the adverse/toxic effect of dexamethasone, specifically including GI ulceration and bleeding. Monitor for GI irritation or ulceration.     Drug-Allergy interactions: none     Drug-Food interactions:   Pomalidomide and Grapefruit Juice: may result in increased exposure to pomalidomide    Drug-Ethanol interactions:   Acetaminophen & alcohol: may result in increased risk of hepatotoxicity    Treatment protocol says to take aspirin 325mg daily, however patient is only taking aspirin 81mg daily. Please address.     If there are any additional questions or concerns, please contact the inpatient pharmacy ([phone]) for further review.     Poonam Izaguirre, [facility]  April 10, 2024

## 2024-04-11 ENCOUNTER — TELEPHONE (OUTPATIENT)
Dept: ONCOLOGY | Facility: OTHER | Age: 74
End: 2024-04-11

## 2024-04-11 DIAGNOSIS — C90.00 MULTIPLE MYELOMA NOT HAVING ACHIEVED REMISSION (H): Primary | ICD-10-CM

## 2024-04-11 NOTE — ORAL ONC MGMT
"Oral Chemotherapy Monitoring Program    Primary Oncologist: Dr. Walker  Primary Oncology Clinic: [facility]  Cancer Diagnosis: Myeloma    Drug: Pomalidomide (Pomalyst) 1 mg daily, days 1-21 of 28  Start Date: Planning 4/29/24  Expected duration of therapy: Until disease progression or unacceptable toxicity    Drug Interaction Assessment: No clinically significant drug interactions.     Lab Monitoring Plan  Per treatment plan    Subjective/Objective:  Carmelita Watts is a 73 year old female contacted by phone for an initial visit for oral chemotherapy education.         4/11/2024    11:00 AM   ORAL CHEMOTHERAPY   Assessment Type New Teach   Diagnosis Code Multiple Myeloma   Providers Aaron   Clinic Name/Location [facility]   Drug Name Pomalyst (pomalidomide)   Dose 1 mg   Current Schedule Daily   Cycle Details 3 weeks on, 1 week off   Planned next cycle start date 4/29/2024   Any new drug interactions? No   Is the dose as ordered appropriate for the patient? Yes       Vitals:  BP:   BP Readings from Last 1 Encounters:   04/08/24 102/68     Wt Readings from Last 1 Encounters:   04/08/24 79.4 kg (175 lb)     Estimated body surface area is 1.88 meters squared as calculated from the following:    Height as of 4/8/24: 1.6 m (5' 3\").    Weight as of 4/8/24: 79.4 kg (175 lb).    Labs:  _  Result Component Current Result Ref Range   Sodium 135 (3/27/2024) 135 - 145 mmol/L     _  Result Component Current Result Ref Range   Potassium 3.4 (3/27/2024) 3.4 - 5.3 mmol/L     _  Result Component Current Result Ref Range   Calcium 9.0 (3/27/2024) 8.8 - 10.2 mg/dL     No results found for Mag within last 30 days.     No results found for Phos within last 30 days.     _  Result Component Current Result Ref Range   Albumin 4.1 (3/27/2024) 3.5 - 5.2 g/dL     _  Result Component Current Result Ref Range   Urea Nitrogen 16.1 (3/27/2024) 8.0 - 23.0 mg/dL     _  Result Component Current Result Ref Range   Creatinine 0.77 (3/27/2024) 0.51 " - 0.95 mg/dL       _  Result Component Current Result Ref Range   AST 22 (3/27/2024) 0 - 45 U/L     _  Result Component Current Result Ref Range   ALT 17 (3/27/2024) 0 - 50 U/L     _  Result Component Current Result Ref Range   Bilirubin Total 0.4 (3/27/2024) <=1.2 mg/dL       _  Result Component Current Result Ref Range   WBC Count 6.7 (4/8/2024) 4.0 - 11.0 10e3/uL     _  Result Component Current Result Ref Range   Hemoglobin 14.0 (4/8/2024) 11.7 - 15.7 g/dL     _  Result Component Current Result Ref Range   Platelet Count 181 (4/8/2024) 150 - 450 10e3/uL     No results found for ANC within last 30 days.         Assessment:  Patient is appropriate to start therapy.    Plan:  Basic chemotherapy teaching was reviewed with the patient including indication, start date of therapy, dose, administration, adverse effects, missed doses, food and drug interactions, monitoring, side effect management, office contact information, and safe handling. Written materials were provided and all questions answered.    Pomalidomide released to [facility]. Carmelita knows to wait to start pomalidomide until 4/29/24 with her next cycle of daratumumab.     Follow-Up:  Initial assessment ~1 week after starting pomalidomide to assess tolerability and adherence.     Anton Chopra, PharmD  Hematology/Oncology Clinical Pharmacist  [facility]  [phone]

## 2024-04-11 NOTE — TELEPHONE ENCOUNTER
Oral Chemotherapy Monitoring Program   Medication: Pomalyst   Rx: 1mg PO daily on days 1 through 21 of 28 day cycle (as of 4/11/24)  Auth #: [id number]  Provider: Aaron  Risk Category: Adult Female  Routine survey questions reviewed.   Rx to be Escribed to SP, [facility].    Marlyn MCPHERSON, CPhT  Pharmacy Liaison [facility] ([facility], [place])  [email or web address]  Ph: [phone]  Fax: [phone]

## 2024-04-12 NOTE — PROGRESS NOTES
Chief Complaint   Patient presents with    Follow Up     Follow up ears       Patient returns to ENT.  Patient was last seen 10/16/2023 at that time was doing well.  Her ears were cleaned.  She will use drops intermittently at times but it does help her symptoms.   She has resumed chemotherapy again this summer and doing fairly well. Tolerating her infusions w/pre treatment.      Today, Carmelita has been doing well overall.   She had completed three drops in her left ear for a few days. Reports ears overall are stable.   She has MM and completed PET CT. Negative PET CT. She has been receiving her Injection, po medications.      Denies otalgia, otorrhea  Denies worrisome tinnitus  Denies fluctuating hearing loss or tinnitus.   Denies vertigo or facial paraesthesia.    Right tube has been absent and right TM intact without effusion.  Left TM does have small perforation noted.   BTTI placed on 9/20/19 by Dr. Ley in office          Past Medical History:   Diagnosis Date    Arthritis     Cyst of breast     Depressive disorder     Diabetes mellitus, type 2 (H) 01/18/2021    Essential hypertension 10/01/2015    Major depressive disorder, recurrent episode, mild (H24) 10/01/2015    Mixed hyperlipidemia 10/01/2015    Multiple myeloma not having achieved remission (H) 06/24/2019    Other specified disorders of bladder 07/09/2012    Irritable Bladder    Seasonal allergies 10/01/2015    Unspecified essential hypertension 03/19/2007    Unspecified sinusitis (chronic) 09/05/2007        Allergies   Allergen Reactions    Lisinopril Cough    Phenylephrine Hcl Other (See Comments)     **Entex  HEADACHE (SEVERE)    Phenylpropanolamine Other (See Comments)     **Entex  HEADACHE (SEVERE)    Pseudoephedrine Tannate Other (See Comments)     **Entex  HEADACHE (SEVERE)    Levofloxacin Rash     **Levaquin    Moxifloxacin Hcl [Moxifloxacin Hcl In Nacl] Rash     Current Outpatient Medications   Medication Sig Dispense Refill     "acyclovir (ZOVIRAX) 400 MG tablet Take 1 tablet (400 mg) by mouth 2 times daily Start 1 week prior to daratumumab and continue for 3 months after daratumumab treatment complete. 60 tablet 11    aspirin (ASA) 81 MG chewable tablet Take 81 mg by mouth daily      atorvastatin (LIPITOR) 20 MG tablet Take 1 tablet (20 mg) by mouth daily 90 tablet 3    dexAMETHasone (DECADRON) 4 MG tablet Take 20 mg (5 tablets) weekly while on treatment. Take Day 1, Day 8, Day 15, Day 22 of treatment. 60 tablet 1    diphenhydrAMINE (BENADRYL) 25 MG capsule Take 25 mg by mouth daily      fluticasone (FLONASE) 50 MCG/ACT nasal spray SPRAY 2 SPRAYS INTO BOTH NOSTRILS DAILY 48 mL 0    L-Lysine HCl 500 MG CAPS Take by mouth daily      losartan (COZAAR) 50 MG tablet TAKE 1 TABLET BY MOUTH DAILY 90 tablet 3    pomalidomide (POMALYST) 1 MG capsule Take 1 capsule (1 mg) by mouth daily for 21 days Day 1 thru 21 of each 28 day cycle. Swallow whole with water. 21 capsule 0    prochlorperazine (COMPAZINE) 10 MG tablet Take 1 tablet (10 mg) by mouth every 6 hours as needed for nausea or vomiting 30 tablet 2    sertraline (ZOLOFT) 50 MG tablet TAKE 1/2 TABLET BY MOUTH DAILY 30 tablet 4     No current facility-administered medications for this visit.     ROS- SEE HPI  BP (!) 145/83 (BP Location: Left arm, Patient Position: Sitting, Cuff Size: Adult Regular)   Pulse 72   Temp 97.6  F (36.4  C) (Temporal)   Resp 18   Ht 1.6 m (5' 2.99\")   Wt 77.8 kg (171 lb 8.3 oz)   SpO2 96%   BMI 30.39 kg/m        General - The patient is well nourished and well developed, and appears to have good nutritional status.    Head and Face - Normocephalic and atraumatic, with no gross asymmetry noted of the contour of the facial features.  The facial nerve is intact, with strong symmetric movements.  Eyes - Extraocular movements intact, and the pupils were reactive to light.  Sclera were not icteric or injected, conjunctiva were pink and moist.  Mouth - Examination of " the oral cavity shows pink, healthy, moist mucosa.  No lesions or ulceration noted.  The dentition are in good repair.  The tongue is mobile and midline.  Ears - Examination of the ears showed- Right canal clear. Right TM is intact without effusion or perforation. LEFT Ear examined under otologic microscopy.  There is dried debris along inferior floor of canal removed with alligator forceps. Scant otorrhea by TM.   Left TM is with perforation, central anterior about 15%. ME appears healthy   Eyes - Extraocular movements intact, and the pupils were reactive to light.  Sclera were not icteric or injected, conjunctiva were pink and moist.  Mouth - Examination of the oral cavity showed pink, healthy oral mucosa. No lesions or ulcerations noted.  The tongue was mobile and midline, and the dentition were in good condition.    Throat - The walls of the oropharynx were smooth, pink, moist, symmetric, and had no lesions or ulcerations.  The tonsillar pillars and soft palate were symmetric.  The uvula was midline on elevation.    Neck - Normal midline excursion of the laryngotracheal complex during swallowing.  Full range of motion on passive movement.  Palpation of the occipital, submental, submandibular, internal jugular chain, and supraclavicular nodes did not demonstrate any abnormal lymph nodes or masses.  Palpation of the thyroid was soft and smooth, with no nodules or goiter appreciated.  The trachea was mobile and midline.     ASSESSMENT/ PLAN:    ICD-10-CM    1. Perforation of tympanic membrane, left  H72.92       2. H/O myringotomy w/ tube placement  Z98.890       3. Sensorineural hearing loss (SNHL) of right ear with restricted hearing of left ear  H90.A21       4. Mixed conductive and sensorineural hearing loss of left ear with restricted hearing of right ear  H90.A32       5. Otorrhea, left  H92.12         Complete Floxin drops for the next 2 days.    Ears were cleaned  Stable left TM perforation.   No drainage  or moisture on exam     Follow up in 6 months           Bridgette Ly PA-C  ENT  [facility], [place]

## 2024-04-15 ENCOUNTER — INFUSION THERAPY VISIT (OUTPATIENT)
Dept: INFUSION THERAPY | Facility: OTHER | Age: 74
End: 2024-04-15
Attending: INTERNAL MEDICINE
Payer: MEDICARE

## 2024-04-15 VITALS
OXYGEN SATURATION: 95 % | HEART RATE: 82 BPM | RESPIRATION RATE: 17 BRPM | WEIGHT: 171.52 LBS | SYSTOLIC BLOOD PRESSURE: 115 MMHG | TEMPERATURE: 98.8 F | DIASTOLIC BLOOD PRESSURE: 76 MMHG | BODY MASS INDEX: 30.38 KG/M2

## 2024-04-15 DIAGNOSIS — C90.00 MULTIPLE MYELOMA NOT HAVING ACHIEVED REMISSION (H): Primary | ICD-10-CM

## 2024-04-15 LAB
BASOPHILS # BLD AUTO: 0 10E3/UL (ref 0–0.2)
BASOPHILS NFR BLD AUTO: 0 %
EOSINOPHIL # BLD AUTO: 0.2 10E3/UL (ref 0–0.7)
EOSINOPHIL NFR BLD AUTO: 3 %
ERYTHROCYTE [DISTWIDTH] IN BLOOD BY AUTOMATED COUNT: 14.1 % (ref 10–15)
HCT VFR BLD AUTO: 40 % (ref 35–47)
HGB BLD-MCNC: 13.9 G/DL (ref 11.7–15.7)
IMM GRANULOCYTES # BLD: 0 10E3/UL
IMM GRANULOCYTES NFR BLD: 1 %
LYMPHOCYTES # BLD AUTO: 2.1 10E3/UL (ref 0.8–5.3)
LYMPHOCYTES NFR BLD AUTO: 36 %
MCH RBC QN AUTO: 31.4 PG (ref 26.5–33)
MCHC RBC AUTO-ENTMCNC: 34.8 G/DL (ref 31.5–36.5)
MCV RBC AUTO: 90 FL (ref 78–100)
MONOCYTES # BLD AUTO: 0.5 10E3/UL (ref 0–1.3)
MONOCYTES NFR BLD AUTO: 8 %
NEUTROPHILS # BLD AUTO: 3.1 10E3/UL (ref 1.6–8.3)
NEUTROPHILS NFR BLD AUTO: 52 %
NRBC # BLD AUTO: 0 10E3/UL
NRBC BLD AUTO-RTO: 0 /100
PLATELET # BLD AUTO: 188 10E3/UL (ref 150–450)
RBC # BLD AUTO: 4.43 10E6/UL (ref 3.8–5.2)
WBC # BLD AUTO: 6 10E3/UL (ref 4–11)

## 2024-04-15 PROCEDURE — 85025 COMPLETE CBC W/AUTO DIFF WBC: CPT | Mod: ZL | Performed by: INTERNAL MEDICINE

## 2024-04-15 PROCEDURE — 36415 COLL VENOUS BLD VENIPUNCTURE: CPT | Mod: ZL | Performed by: INTERNAL MEDICINE

## 2024-04-15 PROCEDURE — 250N000011 HC RX IP 250 OP 636: Mod: JZ | Performed by: INTERNAL MEDICINE

## 2024-04-15 PROCEDURE — 96401 CHEMO ANTI-NEOPL SQ/IM: CPT

## 2024-04-15 RX ORDER — MONTELUKAST SODIUM 10 MG/1
10 TABLET ORAL ONCE
Status: COMPLETED | OUTPATIENT
Start: 2024-04-15 | End: 2024-04-15

## 2024-04-15 RX ORDER — DIPHENHYDRAMINE HCL 50 MG
50 CAPSULE ORAL ONCE
Status: COMPLETED | OUTPATIENT
Start: 2024-04-15 | End: 2024-04-15

## 2024-04-15 RX ORDER — DEXAMETHASONE 4 MG/1
20 TABLET ORAL ONCE
Status: COMPLETED | OUTPATIENT
Start: 2024-04-15 | End: 2024-04-15

## 2024-04-15 RX ORDER — ACETAMINOPHEN 325 MG/1
650 TABLET ORAL ONCE
Status: COMPLETED | OUTPATIENT
Start: 2024-04-15 | End: 2024-04-15

## 2024-04-15 RX ADMIN — MONTELUKAST SODIUM 10 MG: 10 TABLET ORAL at 11:57

## 2024-04-15 RX ADMIN — Medication 50 MG: at 11:57

## 2024-04-15 RX ADMIN — DEXAMETHASONE 20 MG: 4 TABLET ORAL at 11:57

## 2024-04-15 RX ADMIN — DARATUMUMAB AND HYALURONIDASE-FIHJ (HUMAN RECOMBINANT) 1800 MG: 1800; 30000 INJECTION SUBCUTANEOUS at 12:22

## 2024-04-15 RX ADMIN — ACETAMINOPHEN 650 MG: 325 TABLET ORAL at 11:57

## 2024-04-15 NOTE — PROGRESS NOTES
Infusion Nursing Note:  Carmelita Watts presents today for Faspro..    Patient seen by provider today: No   present during visit today: Not Applicable.    Note:   Component      Latest Ref Rng 4/15/2024  11:09 AM   WBC      4.0 - 11.0 10e3/uL 6.0    RBC Count      3.80 - 5.20 10e6/uL 4.43    Hemoglobin      11.7 - 15.7 g/dL 13.9    Hematocrit      35.0 - 47.0 % 40.0    MCV      78 - 100 fL 90    MCH      26.5 - 33.0 pg 31.4    MCHC      31.5 - 36.5 g/dL 34.8    RDW      10.0 - 15.0 % 14.1    Platelet Count      150 - 450 10e3/uL 188    % Neutrophils      % 52    % Lymphocytes      % 36    % Monocytes      % 8    % Eosinophils      % 3    % Basophils      % 0    % Immature Granulocytes      % 1    NRBCs per 100 WBC      <1 /100 0    Absolute Neutrophils      1.6 - 8.3 10e3/uL 3.1    Absolute Lymphocytes      0.8 - 5.3 10e3/uL 2.1    Absolute Monocytes      0.0 - 1.3 10e3/uL 0.5    Absolute Eosinophils      0.0 - 0.7 10e3/uL 0.2    Absolute Basophils      0.0 - 0.2 10e3/uL 0.0    Absolute Immature Granulocytes      <=0.4 10e3/uL 0.0    Absolute NRBCs      10e3/uL 0.0          Intravenous Access:  Lab draw site LFA, Needle type butterfly, Gauge 23.    Treatment Conditions:  Results reviewed, labs MET treatment parameters, ok to proceed with treatment.      Post Infusion Assessment:  Patient tolerated injection without incident. Injection done on right side of abdomen.      Discharge Plan:   Discharge instructions reviewed with: Patient.

## 2024-04-16 ENCOUNTER — OFFICE VISIT (OUTPATIENT)
Dept: OTOLARYNGOLOGY | Facility: OTHER | Age: 74
End: 2024-04-16
Attending: PHYSICIAN ASSISTANT
Payer: COMMERCIAL

## 2024-04-16 VITALS
TEMPERATURE: 97.6 F | HEART RATE: 72 BPM | DIASTOLIC BLOOD PRESSURE: 83 MMHG | HEIGHT: 63 IN | OXYGEN SATURATION: 96 % | WEIGHT: 171.52 LBS | RESPIRATION RATE: 18 BRPM | BODY MASS INDEX: 30.39 KG/M2 | SYSTOLIC BLOOD PRESSURE: 145 MMHG

## 2024-04-16 DIAGNOSIS — Z98.890 H/O MYRINGOTOMY: ICD-10-CM

## 2024-04-16 DIAGNOSIS — H90.A21 SENSORINEURAL HEARING LOSS (SNHL) OF RIGHT EAR WITH RESTRICTED HEARING OF LEFT EAR: ICD-10-CM

## 2024-04-16 DIAGNOSIS — H92.12 OTORRHEA, LEFT: ICD-10-CM

## 2024-04-16 DIAGNOSIS — H90.A32 MIXED CONDUCTIVE AND SENSORINEURAL HEARING LOSS OF LEFT EAR WITH RESTRICTED HEARING OF RIGHT EAR: ICD-10-CM

## 2024-04-16 DIAGNOSIS — H72.92 PERFORATION OF TYMPANIC MEMBRANE, LEFT: Primary | ICD-10-CM

## 2024-04-16 PROCEDURE — G0463 HOSPITAL OUTPT CLINIC VISIT: HCPCS

## 2024-04-16 PROCEDURE — 92504 EAR MICROSCOPY EXAMINATION: CPT | Performed by: PHYSICIAN ASSISTANT

## 2024-04-16 PROCEDURE — 99213 OFFICE O/P EST LOW 20 MIN: CPT | Mod: 25 | Performed by: PHYSICIAN ASSISTANT

## 2024-04-16 ASSESSMENT — ANXIETY QUESTIONNAIRES
GAD7 TOTAL SCORE: 0
6. BECOMING EASILY ANNOYED OR IRRITABLE: NOT AT ALL
4. TROUBLE RELAXING: NOT AT ALL
7. FEELING AFRAID AS IF SOMETHING AWFUL MIGHT HAPPEN: NOT AT ALL
2. NOT BEING ABLE TO STOP OR CONTROL WORRYING: NOT AT ALL
5. BEING SO RESTLESS THAT IT IS HARD TO SIT STILL: NOT AT ALL
3. WORRYING TOO MUCH ABOUT DIFFERENT THINGS: NOT AT ALL
GAD7 TOTAL SCORE: 0
IF YOU CHECKED OFF ANY PROBLEMS ON THIS QUESTIONNAIRE, HOW DIFFICULT HAVE THESE PROBLEMS MADE IT FOR YOU TO DO YOUR WORK, TAKE CARE OF THINGS AT HOME, OR GET ALONG WITH OTHER PEOPLE: NOT DIFFICULT AT ALL
1. FEELING NERVOUS, ANXIOUS, OR ON EDGE: NOT AT ALL

## 2024-04-16 ASSESSMENT — PAIN SCALES - GENERAL: PAINLEVEL: NO PAIN (0)

## 2024-04-16 NOTE — LETTER
4/16/2024         RE: Carmelita Watts  [address] [facility] [address]        Dear Colleague,    Thank you for referring your patient, Carmelita Watts, to the [facility]. Please see a copy of my visit note below.    Chief Complaint   Patient presents with     Follow Up     Follow up ears       Patient returns to ENT.  Patient was last seen 10/16/2023 at that time was doing well.  Her ears were cleaned.  She will use drops intermittently at times but it does help her symptoms.   She has resumed chemotherapy again this summer and doing fairly well. Tolerating her infusions w/pre treatment.      Today, Carmelita has been doing well overall.   She had completed three drops in her left ear for a few days. Reports ears overall are stable.   She has MM and completed PET CT. Negative PET CT. She has been receiving her Injection, po medications.      Denies otalgia, otorrhea  Denies worrisome tinnitus  Denies fluctuating hearing loss or tinnitus.   Denies vertigo or facial paraesthesia.    Right tube has been absent and right TM intact without effusion.  Left TM does have small perforation noted.   BTTI placed on 9/20/19 by Dr. Ley in office          Past Medical History:   Diagnosis Date     Arthritis      Cyst of breast      Depressive disorder      Diabetes mellitus, type 2 (H) 01/18/2021     Essential hypertension 10/01/2015     Major depressive disorder, recurrent episode, mild (H24) 10/01/2015     Mixed hyperlipidemia 10/01/2015     Multiple myeloma not having achieved remission (H) 06/24/2019     Other specified disorders of bladder 07/09/2012    Irritable Bladder     Seasonal allergies 10/01/2015     Unspecified essential hypertension 03/19/2007     Unspecified sinusitis (chronic) 09/05/2007        Allergies   Allergen Reactions     Lisinopril Cough     Phenylephrine Hcl Other (See Comments)     **Entex  HEADACHE (SEVERE)     Phenylpropanolamine Other (See Comments)      "**Entex  HEADACHE (SEVERE)     Pseudoephedrine Tannate Other (See Comments)     **Entex  HEADACHE (SEVERE)     Levofloxacin Rash     **Levaquin     Moxifloxacin Hcl [Moxifloxacin Hcl In Nacl] Rash     Current Outpatient Medications   Medication Sig Dispense Refill     acyclovir (ZOVIRAX) 400 MG tablet Take 1 tablet (400 mg) by mouth 2 times daily Start 1 week prior to daratumumab and continue for 3 months after daratumumab treatment complete. 60 tablet 11     aspirin (ASA) 81 MG chewable tablet Take 81 mg by mouth daily       atorvastatin (LIPITOR) 20 MG tablet Take 1 tablet (20 mg) by mouth daily 90 tablet 3     dexAMETHasone (DECADRON) 4 MG tablet Take 20 mg (5 tablets) weekly while on treatment. Take Day 1, Day 8, Day 15, Day 22 of treatment. 60 tablet 1     diphenhydrAMINE (BENADRYL) 25 MG capsule Take 25 mg by mouth daily       fluticasone (FLONASE) 50 MCG/ACT nasal spray SPRAY 2 SPRAYS INTO BOTH NOSTRILS DAILY 48 mL 0     L-Lysine HCl 500 MG CAPS Take by mouth daily       losartan (COZAAR) 50 MG tablet TAKE 1 TABLET BY MOUTH DAILY 90 tablet 3     pomalidomide (POMALYST) 1 MG capsule Take 1 capsule (1 mg) by mouth daily for 21 days Day 1 thru 21 of each 28 day cycle. Swallow whole with water. 21 capsule 0     prochlorperazine (COMPAZINE) 10 MG tablet Take 1 tablet (10 mg) by mouth every 6 hours as needed for nausea or vomiting 30 tablet 2     sertraline (ZOLOFT) 50 MG tablet TAKE 1/2 TABLET BY MOUTH DAILY 30 tablet 4     No current facility-administered medications for this visit.     ROS- SEE HPI  BP (!) 145/83 (BP Location: Left arm, Patient Position: Sitting, Cuff Size: Adult Regular)   Pulse 72   Temp 97.6  F (36.4  C) (Temporal)   Resp 18   Ht 1.6 m (5' 2.99\")   Wt 77.8 kg (171 lb 8.3 oz)   SpO2 96%   BMI 30.39 kg/m        General - The patient is well nourished and well developed, and appears to have good nutritional status.    Head and Face - Normocephalic and atraumatic, with no gross asymmetry " noted of the contour of the facial features.  The facial nerve is intact, with strong symmetric movements.  Eyes - Extraocular movements intact, and the pupils were reactive to light.  Sclera were not icteric or injected, conjunctiva were pink and moist.  Mouth - Examination of the oral cavity shows pink, healthy, moist mucosa.  No lesions or ulceration noted.  The dentition are in good repair.  The tongue is mobile and midline.  Ears - Examination of the ears showed- Right canal clear. Right TM is intact without effusion or perforation. LEFT Ear examined under otologic microscopy.  There is dried debris along inferior floor of canal removed with alligator forceps. Scant otorrhea by TM.   Left TM is with perforation, central anterior about 15%. ME appears healthy   Eyes - Extraocular movements intact, and the pupils were reactive to light.  Sclera were not icteric or injected, conjunctiva were pink and moist.  Mouth - Examination of the oral cavity showed pink, healthy oral mucosa. No lesions or ulcerations noted.  The tongue was mobile and midline, and the dentition were in good condition.    Throat - The walls of the oropharynx were smooth, pink, moist, symmetric, and had no lesions or ulcerations.  The tonsillar pillars and soft palate were symmetric.  The uvula was midline on elevation.    Neck - Normal midline excursion of the laryngotracheal complex during swallowing.  Full range of motion on passive movement.  Palpation of the occipital, submental, submandibular, internal jugular chain, and supraclavicular nodes did not demonstrate any abnormal lymph nodes or masses.  Palpation of the thyroid was soft and smooth, with no nodules or goiter appreciated.  The trachea was mobile and midline.     ASSESSMENT/ PLAN:    ICD-10-CM    1. Perforation of tympanic membrane, left  H72.92       2. H/O myringotomy w/ tube placement  Z98.890       3. Sensorineural hearing loss (SNHL) of right ear with restricted hearing of  left ear  H90.A21       4. Mixed conductive and sensorineural hearing loss of left ear with restricted hearing of right ear  H90.A32       5. Otorrhea, left  H92.12         Complete Floxin drops for the next 2 days.    Ears were cleaned  Stable left TM perforation.   No drainage or moisture on exam     Follow up in 6 months           Bridgette Ly PA-C  ENT  [facility], [place]      Again, thank you for allowing me to participate in the care of your patient.        Sincerely,        Bridgette Ly PA-C

## 2024-04-16 NOTE — PATIENT INSTRUCTIONS
Ear exam stable.   Complete ear drops in left ear for the next 2 days  Continue w/ hearing aid use.   Maintain water precautions for left ear.     Follow up in 6 months      Thank you for allowing Bridgette Ly PA-C and our ENT team to participate in your care.  If your medications are too expensive, please give the nurse a call.  We can possibly change this medication.  If you have a scheduling or an appointment question please contact our Health Unit Coordinator at [phone], Ext. [phone].    ALL nursing questions or concerns can be directed to your ENT nurse at: [phone] [place]

## 2024-04-22 ENCOUNTER — INFUSION THERAPY VISIT (OUTPATIENT)
Dept: INFUSION THERAPY | Facility: OTHER | Age: 74
End: 2024-04-22
Attending: INTERNAL MEDICINE
Payer: MEDICARE

## 2024-04-22 VITALS
RESPIRATION RATE: 18 BRPM | DIASTOLIC BLOOD PRESSURE: 71 MMHG | SYSTOLIC BLOOD PRESSURE: 105 MMHG | BODY MASS INDEX: 31.17 KG/M2 | WEIGHT: 175.93 LBS | HEART RATE: 91 BPM | TEMPERATURE: 98.4 F | OXYGEN SATURATION: 95 %

## 2024-04-22 DIAGNOSIS — C90.00 MULTIPLE MYELOMA NOT HAVING ACHIEVED REMISSION (H): Primary | ICD-10-CM

## 2024-04-22 LAB
BASOPHILS # BLD AUTO: 0 10E3/UL (ref 0–0.2)
BASOPHILS NFR BLD AUTO: 1 %
EOSINOPHIL # BLD AUTO: 0.2 10E3/UL (ref 0–0.7)
EOSINOPHIL NFR BLD AUTO: 3 %
ERYTHROCYTE [DISTWIDTH] IN BLOOD BY AUTOMATED COUNT: 14.2 % (ref 10–15)
HCT VFR BLD AUTO: 40.2 % (ref 35–47)
HGB BLD-MCNC: 13.7 G/DL (ref 11.7–15.7)
IMM GRANULOCYTES # BLD: 0 10E3/UL
IMM GRANULOCYTES NFR BLD: 1 %
LYMPHOCYTES # BLD AUTO: 2.2 10E3/UL (ref 0.8–5.3)
LYMPHOCYTES NFR BLD AUTO: 36 %
MCH RBC QN AUTO: 30.7 PG (ref 26.5–33)
MCHC RBC AUTO-ENTMCNC: 34.1 G/DL (ref 31.5–36.5)
MCV RBC AUTO: 90 FL (ref 78–100)
MONOCYTES # BLD AUTO: 0.4 10E3/UL (ref 0–1.3)
MONOCYTES NFR BLD AUTO: 7 %
NEUTROPHILS # BLD AUTO: 3.3 10E3/UL (ref 1.6–8.3)
NEUTROPHILS NFR BLD AUTO: 53 %
NRBC # BLD AUTO: 0 10E3/UL
NRBC BLD AUTO-RTO: 0 /100
PLATELET # BLD AUTO: 182 10E3/UL (ref 150–450)
RBC # BLD AUTO: 4.46 10E6/UL (ref 3.8–5.2)
WBC # BLD AUTO: 6.1 10E3/UL (ref 4–11)

## 2024-04-22 PROCEDURE — 85004 AUTOMATED DIFF WBC COUNT: CPT | Mod: ZL | Performed by: INTERNAL MEDICINE

## 2024-04-22 PROCEDURE — 84165 PROTEIN E-PHORESIS SERUM: CPT | Mod: 26 | Performed by: PATHOLOGY

## 2024-04-22 PROCEDURE — 86334 IMMUNOFIX E-PHORESIS SERUM: CPT | Mod: 26 | Performed by: PATHOLOGY

## 2024-04-22 PROCEDURE — 83521 IG LIGHT CHAINS FREE EACH: CPT | Mod: ZL

## 2024-04-22 PROCEDURE — 250N000011 HC RX IP 250 OP 636: Mod: JZ | Performed by: INTERNAL MEDICINE

## 2024-04-22 PROCEDURE — 84165 PROTEIN E-PHORESIS SERUM: CPT | Mod: ZL | Performed by: PATHOLOGY

## 2024-04-22 PROCEDURE — 84155 ASSAY OF PROTEIN SERUM: CPT | Mod: ZL

## 2024-04-22 PROCEDURE — 86334 IMMUNOFIX E-PHORESIS SERUM: CPT | Mod: ZL | Performed by: PATHOLOGY

## 2024-04-22 PROCEDURE — 82232 ASSAY OF BETA-2 PROTEIN: CPT | Mod: ZL

## 2024-04-22 PROCEDURE — 36415 COLL VENOUS BLD VENIPUNCTURE: CPT | Mod: ZL

## 2024-04-22 PROCEDURE — 82784 ASSAY IGA/IGD/IGG/IGM EACH: CPT | Mod: ZL

## 2024-04-22 PROCEDURE — 85810 BLOOD VISCOSITY EXAMINATION: CPT | Mod: ZL

## 2024-04-22 PROCEDURE — 96401 CHEMO ANTI-NEOPL SQ/IM: CPT

## 2024-04-22 RX ORDER — DEXAMETHASONE 4 MG/1
20 TABLET ORAL ONCE
Status: COMPLETED | OUTPATIENT
Start: 2024-04-22 | End: 2024-04-22

## 2024-04-22 RX ADMIN — DARATUMUMAB AND HYALURONIDASE-FIHJ (HUMAN RECOMBINANT) 1800 MG: 1800; 30000 INJECTION SUBCUTANEOUS at 12:30

## 2024-04-22 RX ADMIN — DEXAMETHASONE 20 MG: 4 TABLET ORAL at 12:12

## 2024-04-22 ASSESSMENT — PAIN SCALES - GENERAL: PAINLEVEL: NO PAIN (0)

## 2024-04-22 NOTE — PROGRESS NOTES
Infusion Nursing Note:  Carmelita Watts presents today for FASPRO.    Patient seen by provider today: No   present during visit today: Not Applicable.    Note: N/A.      Intravenous Access:  Lab draw peripherally    Treatment Conditions:  Lab Results   Component Value Date    HGB 13.7 04/22/2024    WBC 6.1 04/22/2024    ANEU 1.0 (L) 01/03/2023    ANEUTAUTO 3.3 04/22/2024     04/22/2024        Results reviewed, labs MET treatment parameters, ok to proceed with treatment.      Post Infusion Assessment:  Patient tolerated infusion without incident.       Discharge Plan:   Patient discharged in stable condition accompanied by: self.  Departure Mode: Ambulatory.

## 2024-04-22 NOTE — PATIENT INSTRUCTIONS
We will see you back as planned. If you have any questions or concerns, we can be reached Monday through Friday 8am - 430pm at [phone] (PAC). If you have concerns related to a potential reaction/side effect after hours/weekends/holiday's, please seek emergent medical care.

## 2024-04-23 LAB
ALBUMIN SERPL ELPH-MCNC: 4.1 G/DL (ref 3.7–5.1)
ALPHA1 GLOB SERPL ELPH-MCNC: 0.3 G/DL (ref 0.2–0.4)
ALPHA2 GLOB SERPL ELPH-MCNC: 0.7 G/DL (ref 0.5–0.9)
B-GLOBULIN SERPL ELPH-MCNC: 0.6 G/DL (ref 0.6–1)
B2 MICROGLOB TUMOR MARKER SER-MCNC: 1.8 MG/L
GAMMA GLOB SERPL ELPH-MCNC: 1 G/DL (ref 0.7–1.6)
IGA SERPL-MCNC: 17 MG/DL (ref 84–499)
IGG SERPL-MCNC: 1106 MG/DL (ref 610–1616)
IGM SERPL-MCNC: 22 MG/DL (ref 35–242)
KAPPA LC FREE SER-MCNC: 0.33 MG/DL (ref 0.33–1.94)
KAPPA LC FREE/LAMBDA FREE SER NEPH: 1.1 {RATIO} (ref 0.26–1.65)
LAMBDA LC FREE SERPL-MCNC: 0.3 MG/DL (ref 0.57–2.63)
M PROTEIN SERPL ELPH-MCNC: 0.7 G/DL
PATH REPORT.COMMENTS IMP SPEC: ABNORMAL
PATH REPORT.COMMENTS IMP SPEC: NORMAL
PROT PATTERN SERPL ELPH-IMP: ABNORMAL
PROT PATTERN SERPL IFE-IMP: NORMAL
TOTAL PROTEIN SERUM FOR ELP: 6.6 G/DL (ref 6.4–8.3)
VISC SER: 1.5 CP (ref 1.4–1.8)

## 2024-04-24 ENCOUNTER — MYC MEDICAL ADVICE (OUTPATIENT)
Dept: ONCOLOGY | Facility: OTHER | Age: 74
End: 2024-04-24

## 2024-04-24 DIAGNOSIS — R05.8 DRY COUGH: Primary | ICD-10-CM

## 2024-04-24 RX ORDER — BENZONATATE 100 MG/1
100 CAPSULE ORAL 3 TIMES DAILY PRN
Qty: 30 CAPSULE | Refills: 1 | Status: SHIPPED | OUTPATIENT
Start: 2024-04-24 | End: 2024-04-29

## 2024-04-24 RX ORDER — DEXAMETHASONE 4 MG/1
20 TABLET ORAL ONCE
Status: CANCELLED | OUTPATIENT
Start: 2024-04-29

## 2024-04-24 RX ORDER — DIPHENHYDRAMINE HCL 50 MG
50 CAPSULE ORAL ONCE
Status: CANCELLED | OUTPATIENT
Start: 2024-04-29

## 2024-04-24 RX ORDER — MONTELUKAST SODIUM 10 MG/1
10 TABLET ORAL ONCE
Status: CANCELLED | OUTPATIENT
Start: 2024-04-29

## 2024-04-24 RX ORDER — ACETAMINOPHEN 325 MG/1
650 TABLET ORAL ONCE
Status: CANCELLED | OUTPATIENT
Start: 2024-04-29

## 2024-04-24 NOTE — TELEPHONE ENCOUNTER
Sent something for cough, Tessalon. If she feels she is having trouble breathing at any point, she should go in. Hopeful this can calm things down.     FLO Duque CNP

## 2024-04-24 NOTE — TELEPHONE ENCOUNTER
Problem: Noninvasive Ventilation Acute  Goal: Effective Unassisted Ventilation and Oxygenation  Outcome: Ongoing, Progressing     Problem: Adult Inpatient Plan of Care  Goal: Plan of Care Review  Outcome: Ongoing, Progressing  Goal: Patient-Specific Goal (Individualized)  Outcome: Ongoing, Progressing  Goal: Absence of Hospital-Acquired Illness or Injury  Outcome: Ongoing, Progressing  Intervention: Identify and Manage Fall Risk  Recent Flowsheet Documentation  Taken 3/10/2024 1400 by Roberta Morse RN  Safety Promotion/Fall Prevention:   activity supervised   assistive device/personal items within reach   clutter free environment maintained   toileting scheduled   safety round/check completed   room organization consistent  Taken 3/10/2024 1200 by Roberta Morse RN  Safety Promotion/Fall Prevention:   activity supervised   assistive device/personal items within reach   clutter free environment maintained   toileting scheduled   room organization consistent   safety round/check completed  Taken 3/10/2024 1000 by Roberta Morse RN  Safety Promotion/Fall Prevention:   activity supervised   assistive device/personal items within reach   clutter free environment maintained   toileting scheduled   safety round/check completed   room organization consistent  Taken 3/10/2024 0800 by Roberta Morse RN  Safety Promotion/Fall Prevention:   activity supervised   assistive device/personal items within reach   clutter free environment maintained   toileting scheduled   safety round/check completed   room organization consistent  Intervention: Prevent Skin Injury  Recent Flowsheet Documentation  Taken 3/10/2024 1400 by Roberta Morse RN  Body Position: position changed independently  Skin Protection:   adhesive use limited   tubing/devices free from skin contact   transparent dressing maintained   skin-to-device areas padded   skin-to-skin areas padded  Taken 3/10/2024 1200 by Roberta Morse RN  Body Position:  Called patient to check on status and notes she is doing much better today, yesterday morning her coughing started around 10 and lasted well into the evening. Cough is very dry and constant. No fevers noted at all. Today she has only coughed a few times but was very worried last night and was inquiring mainly about getting an inhaler. Advised to patient I would send a message back to [place] for update and review.    position changed independently  Skin Protection:   adhesive use limited   tubing/devices free from skin contact   transparent dressing maintained   skin-to-skin areas padded   skin-to-device areas padded  Taken 3/10/2024 1000 by Roberta Morse RN  Body Position: position changed independently  Skin Protection:   adhesive use limited   tubing/devices free from skin contact   transparent dressing maintained   skin-to-skin areas padded   skin-to-device areas padded  Taken 3/10/2024 0800 by Roberta Morse RN  Body Position: position changed independently  Skin Protection:   adhesive use limited   tubing/devices free from skin contact   transparent dressing maintained   skin-to-skin areas padded   skin-to-device areas padded  Intervention: Prevent and Manage VTE (Venous Thromboembolism) Risk  Recent Flowsheet Documentation  Taken 3/10/2024 1400 by Roberta Morse RN  Activity Management: activity minimized  Taken 3/10/2024 1200 by Roberta Morse RN  Activity Management: activity encouraged  Taken 3/10/2024 1000 by Roberta Morse RN  Activity Management: activity encouraged  Taken 3/10/2024 0800 by Roberta Morse RN  Activity Management: activity encouraged  Goal: Optimal Comfort and Wellbeing  Outcome: Ongoing, Progressing  Intervention: Provide Person-Centered Care  Recent Flowsheet Documentation  Taken 3/10/2024 0800 by Roberta Morse RN  Trust Relationship/Rapport: care explained  Goal: Readiness for Transition of Care  Outcome: Ongoing, Progressing     Problem: Pain Acute  Goal: Acceptable Pain Control and Functional Ability  Outcome: Ongoing, Progressing   Goal Outcome Evaluation:

## 2024-04-24 NOTE — TELEPHONE ENCOUNTER
Called and notified patient of information below, states she has an old bottle at home so she isn't in need of this new one. Thanked us for looking out for her though. Called [facility] pharmacy and cancelled RX since there was no need.

## 2024-04-26 NOTE — PROGRESS NOTES
Oncology Follow-up Visit    Reason for Visit:  Carmelita is a 73 year old woman with a diagnosis of multiple myeloma, who I am visiting with today for routine follow-up and consideration of ongoing therapy.     Nursing Note and documentation reviewed: Yes    Interval History:   Carlene notes that for the last week or so, she has had some cold like symptoms. Initially started as dry, constant cough but eventually she started coughing up sputum. Was green in color but has now progressed to more clear. No fevers. Did covid test last week which was negative. Cough has lessened . Was wheezy the first night these symptoms presented but that has resolved. No other breathing or infection concerns. No bleeding concerns. No nausea or vomiting. No bowel concerns. Appetite down a little bit this week being sick but still eating. Energy a little down this week but improving.     Patient does question if it would be alright to receive her Darzalex with pre-medications. Recall, was on Darzalex previously and tolerated better with pre-medications. Will add back in.     Oncologic History  12/31/2018 Presented to the emergency room with vertigo and fatigue.  CT scan of the head was negative and subsequent stress test was negative.  05/03/2019 PCP ordered lab work and noted a total protein of 12.9.  SPEP at that time showed an M spike of 6.2 with a large monoclonal protein seen in the gamma fraction. Urine immunofixation showed a possible small protein band in the gamma fraction  05/31/2019 Evaluated by Dr. Walker with Medical Oncology with plan to rule out myeloma and obtain bone marrow aspiration biopsy as well as a metastatic bone survey along with additional labwork  06/18/2019 Underwent bone marrow aspiration and biopsy  06/24/2019 Seen again by Dr. Walker and CBC showed a hemoglobin of 9.3, M spike 7.3 with monoclonal IgG immunoglobulin of kappa light chain type; serum viscosity was 2.9; quantitative immunoglobulins showed an IgG  of 8160, beta-2 microglobulin was 5.8, BUN was 21 with creatinine is 0.8 and total protein was 13.7.  Quantitative kappa/lambda free light chains showed an elevated ratio of 17.0 bone marrow aspiration biopsy showed plasma cell myeloma with approximately 80% plasma cells.  Immunofixation showed IgG kappa and flow cytometry revealed kappa monotypic plasma cells consistent with clonal plasma cell neoplasm and FISH panel was pending at that time. It was felt she had at least stage II disease based on her beta-2 microglobulin and anemia. Plan was to treat with Velcade 1.3 mg per/m2 days 1, 4, 8 and 11/Decadron 40 mg on days 1, 8 and 15. Initiation of Revlimid with C2 at 25 mg daily days 1 through 14.  Plan was to also obtain an MRI of the lumbar spine to rule out lytic lesion at L3.  She was initiated on Zovirax 400 mg p.o. twice daily.  06/25/2019 C1 of chemotherapy initiated  07/01/2019 Note in chart regarding patient's large co-pay for the Revlimid and no plan at this point to initiate Revlimid and treat only with Velcade and Decadron per Dr. Walker  07/11/2019 MRI lumbar spine shows a pathologic superior endplate compression fracture at L3 without evidence of retropulsed fragment and innumerable enhancing lesions throughout the lumbar spine consistent with history of multiple myeloma.  09/10/2019 Increasing M-spike and kappa lambda ratio  10/01/2019 Initiation of CyBorD  11/17/2020 Plateau of M-spike at 1.2 after 36 cycles of CyBorD  12/01/2020 Initiation of Darzalex Faspro injection  03/23/2021 M-spike increased form 1.0 to 1.2 and velcade and dexamethasone added to plan  04/12/2022 Treatment HOLIDAY commenced with Mspike 0.3 after 22 cycles of treatent  05/27/2022 Received Evusheld while on treatment holiday.   07/18/2023 Mspike 0.8. Other labs stable. PET scan ordered.   08/02/2023 PET completed showing negative study. No evidence of mass or lymphadenopathy. No concerning hypermetabolism is present.  08/14/2023  Met with Dr. Walker. Given biochemical progression, he did feel appropriate to resume therapy again with Darzalex faspro  03/12/2023 Following C7, mspike magy from 0.2-->0.8. PET ordered which was negative. Again increased frequency of Darzalex with anticipation of adding in Pomalyst with C2.    04/29/2024 Addition of Pomalyst with C2    Current Chemo Regime/TX: Darzalex faspro injection 1800mg subcutaneous per protocol with weekly Dex and Pomalyst 1 mg days 1-21 of 28 day cycle  Current Cycle: C2   Completed cycles: 1  **Pom added C2 (4/29)       Previous treatment:   Velcade 1.3 mg/m2 days 1, 4, 8 and 11 with Decadron 40 mg days 1, 8 and 15 x 4 cycles;   Velcade 1.5mg.m2/cyclophosphamide 150mg every 7 days on days 1,8,15 and 22/decadron 40mg days 1,8,15,22 ; Darzalex faspro injection 1800mg subcutaneous per protocol    Past Medical History:   Diagnosis Date    Arthritis     Cyst of breast     Depressive disorder     Diabetes mellitus, type 2 (H) 01/18/2021    Essential hypertension 10/01/2015    Major depressive disorder, recurrent episode, mild (H24) 10/01/2015    Mixed hyperlipidemia 10/01/2015    Multiple myeloma not having achieved remission (H) 06/24/2019    Other specified disorders of bladder 07/09/2012    Irritable Bladder    Seasonal allergies 10/01/2015    Unspecified essential hypertension 03/19/2007    Unspecified sinusitis (chronic) 09/05/2007       Past Surgical History:   Procedure Laterality Date    APPENDECTOMY      BONE MARROW BIOPSY, BONE SPECIMEN, NEEDLE/TROCAR N/A 06/18/2019    Procedure: BONE MARROW BIOPSY;  Surgeon: Maciej Sanz MD;  Location: [facility] OR    CHOLECYSTECTOMY      COLONOSCOPY  07/18/2006    repeat 10 years    COLONOSCOPY N/A 12/30/2016    Procedure: COLONOSCOPY;  Surgeon: Bhaskar Franklin DO;  Location: [facility] OR    PUNC/ASPIR BREAST CYST Left 1995    benign    SINUS SURGERY      TUBAL STERILIZATION         Family History   Problem Relation Age of Onset    Breast Cancer  "Mother 60        Cause of Death; 1 breast    Parkinsonism Father         \"Possible\"    Coronary Artery Disease Father     Pacemaker Father     Thyroid Disease Daughter     Breast Cancer Maternal Aunt         over 50    Diabetes No family hx of     Hypertension No family hx of     Hyperlipidemia No family hx of     Cerebrovascular Disease No family hx of     Colon Cancer No family hx of     Prostate Cancer No family hx of     Genetic Disorder No family hx of     Asthma No family hx of     Anesthesia Reaction No family hx of        Social History     Socioeconomic History    Marital status:      Spouse name: Not on file    Number of children: Not on file    Years of education: Not on file    Highest education level: Not on file   Occupational History    Occupation: Financial     Comment:  - ([employer])   Tobacco Use    Smoking status: Never    Smokeless tobacco: Never    Tobacco comments:     Tried to Quit (YES); QUIT in 1971; Passive Exposure (NO)   Vaping Use    Vaping status: Never Used   Substance and Sexual Activity    Alcohol use: Yes     Comment: RARELY    Drug use: No    Sexual activity: Yes     Partners: Male     Birth control/protection: None   Other Topics Concern     Service Not Asked    Blood Transfusions Not Asked    Caffeine Concern Yes     Comment: Coffee >6 cups daily    Occupational Exposure Not Asked    Hobby Hazards Not Asked    Sleep Concern Not Asked    Stress Concern Not Asked    Weight Concern Not Asked    Special Diet Not Asked    Back Care Not Asked    Exercise Not Asked    Bike Helmet Not Asked    Seat Belt Not Asked    Self-Exams Not Asked    Parent/sibling w/ CABG, MI or angioplasty before 65F 55M? No   Social History Narrative    Not on file     Social Determinants of Health     Financial Resource Strain: Low Risk  (11/27/2023)    Financial Resource Strain     Within the past 12 months, have you or your family members you live with been unable to get utilities " (heat, electricity) when it was really needed?: No   Food Insecurity: Low Risk  (11/27/2023)    Food Insecurity     Within the past 12 months, did you worry that your food would run out before you got money to buy more?: No     Within the past 12 months, did the food you bought just not last and you didn t have money to get more?: No   Transportation Needs: Low Risk  (11/27/2023)    Transportation Needs     Within the past 12 months, has lack of transportation kept you from medical appointments, getting your medicines, non-medical meetings or appointments, work, or from getting things that you need?: No   Physical Activity: Not on file   Stress: Not on file   Social Connections: Not on file   Interpersonal Safety: Low Risk  (11/27/2023)    Interpersonal Safety     Do you feel physically and emotionally safe where you currently live?: Yes     Within the past 12 months, have you been hit, slapped, kicked or otherwise physically hurt by someone?: No     Within the past 12 months, have you been humiliated or emotionally abused in other ways by your partner or ex-partner?: No   Housing Stability: Low Risk  (11/27/2023)    Housing Stability     Do you have housing? : Yes     Are you worried about losing your housing?: No       Current Outpatient Medications   Medication Sig Dispense Refill    acyclovir (ZOVIRAX) 400 MG tablet Take 1 tablet (400 mg) by mouth 2 times daily Start 1 week prior to daratumumab and continue for 3 months after daratumumab treatment complete. 60 tablet 11    aspirin (ASA) 81 MG chewable tablet Take 81 mg by mouth daily      atorvastatin (LIPITOR) 20 MG tablet Take 1 tablet (20 mg) by mouth daily 90 tablet 3    dexAMETHasone (DECADRON) 4 MG tablet Take 20 mg (5 tablets) weekly while on treatment. Take Day 1, Day 8, Day 15, Day 22 of treatment. 60 tablet 1    fluticasone (FLONASE) 50 MCG/ACT nasal spray SPRAY 2 SPRAYS INTO BOTH NOSTRILS DAILY 48 mL 0    L-Lysine HCl 500 MG CAPS Take by mouth daily    "   losartan (COZAAR) 50 MG tablet TAKE 1 TABLET BY MOUTH DAILY 90 tablet 3    pomalidomide (POMALYST) 1 MG capsule Take 1 capsule (1 mg) by mouth daily for 21 days Day 1 thru 21 of each 28 day cycle. Swallow whole with water. 21 capsule 0    sertraline (ZOLOFT) 50 MG tablet TAKE 1/2 TABLET BY MOUTH DAILY 30 tablet 4    diphenhydrAMINE (BENADRYL) 25 MG capsule Take 25 mg by mouth daily (Patient not taking: Reported on 4/22/2024)      prochlorperazine (COMPAZINE) 10 MG tablet Take 1 tablet (10 mg) by mouth every 6 hours as needed for nausea or vomiting (Patient not taking: Reported on 4/22/2024) 30 tablet 2     No current facility-administered medications for this visit.     Facility-Administered Medications Ordered in Other Visits   Medication Dose Route Frequency Provider Last Rate Last Admin    sodium chloride 0.9% BOLUS 250 mL  250 mL Intravenous Once Elías Walker MD            Allergies   Allergen Reactions    Lisinopril Cough    Phenylephrine Hcl Other (See Comments)     **Entex  HEADACHE (SEVERE)    Phenylpropanolamine Other (See Comments)     **Entex  HEADACHE (SEVERE)    Pseudoephedrine Tannate Other (See Comments)     **Entex  HEADACHE (SEVERE)    Levofloxacin Rash     **Levaquin    Moxifloxacin Hcl [Moxifloxacin Hcl In Nacl] Rash       Review Of Systems:  A complete review of systems is negative except for the above mentioned items in the interval history.     Physical Exam:  /62 (BP Location: Right arm, Patient Position: Sitting, Cuff Size: Adult Regular)   Pulse 87   Temp 98.5  F (36.9  C) (Tympanic)   Ht 1.6 m (5' 3\")   Wt 77.4 kg (170 lb 10.2 oz)   SpO2 96%   BMI 30.23 kg/m    GENERAL APPEARANCE: Alert and in no acute distress but coughing at times. Does not look acutely ill.   HEENT: Eyes appear normal without scleral icterus. Extraocular movements intact.   NECK:   Supple with normal range of motion. No asymmetry or masses.  LYMPHATICS: No palpable cervical, supraclavicular " nodes.  RESP: Lungs clear to auscultation bilaterally, respirations regular and easy.  CARDIOVASCULAR: Regular rate and rhythm. Normal S1, S2; no murmur, gallop, or rub.  ABDOMEN: Soft, non-tender, non-distended. No palpable organomegaly or masses.  MUSCULOSKELETAL: Extremities without gross deformities noted. No edema of bilateral lower extremities.  SKIN: No suspicious lesions or rashes.  NEURO: Alert and oriented x 3.  Gait steady.  PSYCHIATRIC: Mentation and affect appear normal.  Mood appropriate.    Laboratory:  Results for orders placed or performed in visit on 04/29/24   Comprehensive metabolic panel     Status: Abnormal   Result Value Ref Range    Sodium 135 135 - 145 mmol/L    Potassium 3.7 3.4 - 5.3 mmol/L    Carbon Dioxide (CO2) 27 22 - 29 mmol/L    Anion Gap 9 7 - 15 mmol/L    Urea Nitrogen 12.1 8.0 - 23.0 mg/dL    Creatinine 0.73 0.51 - 0.95 mg/dL    GFR Estimate 85 >60 mL/min/1.73m2    Calcium 9.5 8.8 - 10.2 mg/dL    Chloride 99 98 - 107 mmol/L    Glucose 126 (H) 70 - 99 mg/dL    Alkaline Phosphatase 131 40 - 150 U/L    AST 18 0 - 45 U/L    ALT 15 0 - 50 U/L    Protein Total 7.3 6.4 - 8.3 g/dL    Albumin 3.9 3.5 - 5.2 g/dL    Bilirubin Total 0.5 <=1.2 mg/dL   CBC with platelets and differential     Status: None   Result Value Ref Range    WBC Count 6.4 4.0 - 11.0 10e3/uL    RBC Count 4.51 3.80 - 5.20 10e6/uL    Hemoglobin 14.0 11.7 - 15.7 g/dL    Hematocrit 40.8 35.0 - 47.0 %    MCV 91 78 - 100 fL    MCH 31.0 26.5 - 33.0 pg    MCHC 34.3 31.5 - 36.5 g/dL    RDW 13.9 10.0 - 15.0 %    Platelet Count 194 150 - 450 10e3/uL    % Neutrophils 50 %    % Lymphocytes 37 %    % Monocytes 8 %    % Eosinophils 5 %    % Basophils 0 %    % Immature Granulocytes 1 %    NRBCs per 100 WBC 0 <1 /100    Absolute Neutrophils 3.2 1.6 - 8.3 10e3/uL    Absolute Lymphocytes 2.3 0.8 - 5.3 10e3/uL    Absolute Monocytes 0.5 0.0 - 1.3 10e3/uL    Absolute Eosinophils 0.3 0.0 - 0.7 10e3/uL    Absolute Basophils 0.0 0.0 - 0.2 10e3/uL     Absolute Immature Granulocytes 0.1 <=0.4 10e3/uL    Absolute NRBCs 0.0 10e3/uL   CBC with platelets and differential     Status: None    Narrative    The following orders were created for panel order CBC with platelets and differential.  Procedure                               Abnormality         Status                     ---------                               -----------         ------                     CBC with platelets and d...[[id number]]                      Final result                 Please view results for these tests on the individual orders.     Component      Latest Ref Rn 4/22/2024  12:00 PM   Albumin Fraction      3.7 - 5.1 g/dL 4.1    Alpha 1 Fraction      0.2 - 0.4 g/dL 0.3    Alpha 2 Fraction      0.5 - 0.9 g/dL 0.7    Beta Fraction      0.6 - 1.0 g/dL 0.6    Gamma Fraction      0.7 - 1.6 g/dL 1.0    Monoclonal Peak      <=0.0 g/dL 0.7 (H)    ELP Interpretation: Monoclonal protein (about 0.7 g/dL) seen in the gamma fraction. See immunofixation report on same specimen. Pathologic significance requires clinical correlation. BIA Izaguirre M.D., Ph.D., Pathologist ().    Signout Location if Remote [facility]    Signout Location if Remote [facility]    IGG      610 - 1,616 mg/dL 1,106    IGA      84 - 499 mg/dL 17 (L)    IGM      35 - 242 mg/dL 22 (L)    Schuyler Free Lt Chain      0.33 - 1.94 mg/dL 0.33    Lambda Free Lt Chain      0.57 - 2.63 mg/dL 0.30 (L)    Kappa Lambda Ratio      0.26 - 1.65  1.10    Immunofixation ELP Monoclonal IgG immunoglobulin of kappa light chain type. Monoclonal antibody therapeutics (e.g. Daratumumab) may appear as monoclonal proteins on serum electrophoresis and immunofixation, but usually as only very small IgG kappa monoclonals. Nevertheless, results should be interpreted with caution if the patient is receiving monoclonal antibody therapy. Pathologic significance requires clinical correlation.  BIA Izaguirre M.D., Ph.D., Pathologist ()     Beta-2-Microglobulin      <2.3 mg/L 1.8    Viscosity Index      1.4 - 1.8  1.5    Total Protein Serum for ELP      6.4 - 8.3 g/dL 6.6       Legend:  (H) High  (L) Low    Imaging Studies:  None this visit.     ASSESSMENT/PLAN:  #1 Multiple myeloma IgG kappa multiple myeloma with 80% involvement of the bone marrow diagnosed June 2019 initially treated with Velcade and Decadron and received 4 cycles with increasing M-spike and kappa/lambda ratio.  Treatment changed to CyBorD on 10/1/2019.  M-spike plateau at 1.2 and treatment changed to monotherapy with Darzalex Faspro injection. M spike declined to 1.0 then increased again to 1.2 so Velcade and Dex added to her treatment plan with cycle 5 therapy.     Following C22 (April 2022), she did initiate a treatment holiday when her Mspike was 0.3. We had followed her counts for over a year. Unfortunately, most recently, she did see a rise in her mspike to 0.8. PET completed was negative. Dr. Walker felt this was a biochemical progression and after discussion with patient, elected to resume treatment with Darzalex faspro. Following 8 cycles, mspike began to rise. PET was negative but feeling like she wasn't responding to Darzalex alone, decided to add in Pomalyst 1 mg p.o. daily on days 1 through 21 to begin with cycle 2 she will also receive dexamethasone 20 mg p.o. on days 1 ,8,15,22.    Today, she returns for C2 Darzalex with plans to add Pomalyst. Continues to tolerate treatment itself well. Sounds like she had a viral URI this last week but symptoms improving, labs reassuring. Mpike has gone down to 0.7. Will plan on proceeding with treatment today, with weekly Darzalex, Dex and adding Pomalyst Days 1-21 starting today. She will continue daily Acyclovir. I will see her back in 4 weeks, sooner with concerns. Reviewed calendar with her.      #2 Lytic lesions Hx lytic lesion at L3. Due for Zometa today (q3m). She remains on calcium with vitamin D twice a day. Weight  bearing activity.    #3 URI, sounds viral in nature. Cough, sputum improving. No need for abx at this time with improving symptoms and reassuring labs, but advised she reach out if symptoms again worsen or she develops fever. Low threshold for abx given underlying MM.      Patient in agreement with plan and verbalizes understanding. Agrees to call with any questions or concerns.      41 minutes spent in the patient's encounter today with time spent in review of patient's chart along with chart preparation and review of the treatment plan and signing of treatment plan.  Time was also spent with the patient in obtaining a review of systems and performing a physical exam along with detailed review of all test results. Time was also spent in discussing plan for future follow-up and relating instructions for follow-up and in placing future orders.      FLO Duque, FNP-C

## 2024-04-28 ENCOUNTER — HEALTH MAINTENANCE LETTER (OUTPATIENT)
Age: 74
End: 2024-04-28

## 2024-04-29 ENCOUNTER — LAB (OUTPATIENT)
Dept: LAB | Facility: OTHER | Age: 74
End: 2024-04-29
Attending: NURSE PRACTITIONER
Payer: MEDICARE

## 2024-04-29 ENCOUNTER — ONCOLOGY VISIT (OUTPATIENT)
Dept: ONCOLOGY | Facility: OTHER | Age: 74
End: 2024-04-29
Attending: NURSE PRACTITIONER
Payer: MEDICARE

## 2024-04-29 ENCOUNTER — INFUSION THERAPY VISIT (OUTPATIENT)
Dept: INFUSION THERAPY | Facility: OTHER | Age: 74
End: 2024-04-29
Attending: INTERNAL MEDICINE
Payer: MEDICARE

## 2024-04-29 ENCOUNTER — TELEPHONE (OUTPATIENT)
Dept: ONCOLOGY | Facility: OTHER | Age: 74
End: 2024-04-29

## 2024-04-29 VITALS
TEMPERATURE: 98.5 F | BODY MASS INDEX: 30.23 KG/M2 | WEIGHT: 170.64 LBS | DIASTOLIC BLOOD PRESSURE: 62 MMHG | HEART RATE: 87 BPM | OXYGEN SATURATION: 96 % | HEIGHT: 63 IN | SYSTOLIC BLOOD PRESSURE: 126 MMHG

## 2024-04-29 DIAGNOSIS — C90.00 MULTIPLE MYELOMA NOT HAVING ACHIEVED REMISSION (H): Primary | ICD-10-CM

## 2024-04-29 DIAGNOSIS — J06.9 VIRAL UPPER RESPIRATORY TRACT INFECTION: ICD-10-CM

## 2024-04-29 DIAGNOSIS — C90.00 MULTIPLE MYELOMA NOT HAVING ACHIEVED REMISSION (H): ICD-10-CM

## 2024-04-29 LAB
ALBUMIN SERPL BCG-MCNC: 3.9 G/DL (ref 3.5–5.2)
ALP SERPL-CCNC: 131 U/L (ref 40–150)
ALT SERPL W P-5'-P-CCNC: 15 U/L (ref 0–50)
ANION GAP SERPL CALCULATED.3IONS-SCNC: 9 MMOL/L (ref 7–15)
AST SERPL W P-5'-P-CCNC: 18 U/L (ref 0–45)
BASOPHILS # BLD AUTO: 0 10E3/UL (ref 0–0.2)
BASOPHILS NFR BLD AUTO: 0 %
BILIRUB SERPL-MCNC: 0.5 MG/DL
BUN SERPL-MCNC: 12.1 MG/DL (ref 8–23)
CALCIUM SERPL-MCNC: 9.5 MG/DL (ref 8.8–10.2)
CHLORIDE SERPL-SCNC: 99 MMOL/L (ref 98–107)
CREAT SERPL-MCNC: 0.73 MG/DL (ref 0.51–0.95)
DEPRECATED HCO3 PLAS-SCNC: 27 MMOL/L (ref 22–29)
EGFRCR SERPLBLD CKD-EPI 2021: 85 ML/MIN/1.73M2
EOSINOPHIL # BLD AUTO: 0.3 10E3/UL (ref 0–0.7)
EOSINOPHIL NFR BLD AUTO: 5 %
ERYTHROCYTE [DISTWIDTH] IN BLOOD BY AUTOMATED COUNT: 13.9 % (ref 10–15)
GLUCOSE SERPL-MCNC: 126 MG/DL (ref 70–99)
HCT VFR BLD AUTO: 40.8 % (ref 35–47)
HGB BLD-MCNC: 14 G/DL (ref 11.7–15.7)
IMM GRANULOCYTES # BLD: 0.1 10E3/UL
IMM GRANULOCYTES NFR BLD: 1 %
LYMPHOCYTES # BLD AUTO: 2.3 10E3/UL (ref 0.8–5.3)
LYMPHOCYTES NFR BLD AUTO: 37 %
MCH RBC QN AUTO: 31 PG (ref 26.5–33)
MCHC RBC AUTO-ENTMCNC: 34.3 G/DL (ref 31.5–36.5)
MCV RBC AUTO: 91 FL (ref 78–100)
MONOCYTES # BLD AUTO: 0.5 10E3/UL (ref 0–1.3)
MONOCYTES NFR BLD AUTO: 8 %
NEUTROPHILS # BLD AUTO: 3.2 10E3/UL (ref 1.6–8.3)
NEUTROPHILS NFR BLD AUTO: 50 %
NRBC # BLD AUTO: 0 10E3/UL
NRBC BLD AUTO-RTO: 0 /100
PLATELET # BLD AUTO: 194 10E3/UL (ref 150–450)
POTASSIUM SERPL-SCNC: 3.7 MMOL/L (ref 3.4–5.3)
PROT SERPL-MCNC: 7.3 G/DL (ref 6.4–8.3)
RBC # BLD AUTO: 4.51 10E6/UL (ref 3.8–5.2)
SODIUM SERPL-SCNC: 135 MMOL/L (ref 135–145)
WBC # BLD AUTO: 6.4 10E3/UL (ref 4–11)

## 2024-04-29 PROCEDURE — 96365 THER/PROPH/DIAG IV INF INIT: CPT

## 2024-04-29 PROCEDURE — 96401 CHEMO ANTI-NEOPL SQ/IM: CPT

## 2024-04-29 PROCEDURE — 99215 OFFICE O/P EST HI 40 MIN: CPT | Performed by: NURSE PRACTITIONER

## 2024-04-29 PROCEDURE — G2211 COMPLEX E/M VISIT ADD ON: HCPCS | Performed by: NURSE PRACTITIONER

## 2024-04-29 PROCEDURE — 250N000011 HC RX IP 250 OP 636: Mod: JZ | Performed by: INTERNAL MEDICINE

## 2024-04-29 PROCEDURE — 96413 CHEMO IV INFUSION 1 HR: CPT

## 2024-04-29 PROCEDURE — 96415 CHEMO IV INFUSION ADDL HR: CPT

## 2024-04-29 PROCEDURE — 82374 ASSAY BLOOD CARBON DIOXIDE: CPT | Mod: ZL

## 2024-04-29 PROCEDURE — 250N000011 HC RX IP 250 OP 636: Mod: JZ | Performed by: NURSE PRACTITIONER

## 2024-04-29 PROCEDURE — 96368 THER/DIAG CONCURRENT INF: CPT

## 2024-04-29 PROCEDURE — 85049 AUTOMATED PLATELET COUNT: CPT | Mod: ZL

## 2024-04-29 PROCEDURE — 36415 COLL VENOUS BLD VENIPUNCTURE: CPT | Mod: ZL

## 2024-04-29 PROCEDURE — G0463 HOSPITAL OUTPT CLINIC VISIT: HCPCS | Mod: 25

## 2024-04-29 PROCEDURE — 82040 ASSAY OF SERUM ALBUMIN: CPT | Mod: ZL

## 2024-04-29 RX ORDER — ACETAMINOPHEN 325 MG/1
650 TABLET ORAL ONCE
Status: CANCELLED | OUTPATIENT
Start: 2024-05-06

## 2024-04-29 RX ORDER — DIPHENHYDRAMINE HCL 50 MG
50 CAPSULE ORAL ONCE
Status: CANCELLED | OUTPATIENT
Start: 2024-05-20

## 2024-04-29 RX ORDER — ACETAMINOPHEN 325 MG/1
650 TABLET ORAL ONCE
Status: CANCELLED | OUTPATIENT
Start: 2024-05-20

## 2024-04-29 RX ORDER — DEXAMETHASONE 4 MG/1
20 TABLET ORAL ONCE
Status: CANCELLED | OUTPATIENT
Start: 2024-05-06

## 2024-04-29 RX ORDER — DEXAMETHASONE 4 MG/1
20 TABLET ORAL ONCE
Status: CANCELLED | OUTPATIENT
Start: 2024-05-13

## 2024-04-29 RX ORDER — ACETAMINOPHEN 325 MG/1
650 TABLET ORAL ONCE
Status: CANCELLED | OUTPATIENT
Start: 2024-05-13

## 2024-04-29 RX ORDER — ZOLEDRONIC ACID 0.04 MG/ML
4 INJECTION, SOLUTION INTRAVENOUS ONCE
Status: CANCELLED | OUTPATIENT
Start: 2024-04-29 | End: 2024-04-29

## 2024-04-29 RX ORDER — DIPHENHYDRAMINE HCL 50 MG
50 CAPSULE ORAL ONCE
Status: CANCELLED | OUTPATIENT
Start: 2024-05-06

## 2024-04-29 RX ORDER — DIPHENHYDRAMINE HCL 50 MG
50 CAPSULE ORAL ONCE
Status: COMPLETED | OUTPATIENT
Start: 2024-04-29 | End: 2024-04-29

## 2024-04-29 RX ORDER — DEXAMETHASONE 4 MG/1
20 TABLET ORAL ONCE
Status: COMPLETED | OUTPATIENT
Start: 2024-04-29 | End: 2024-04-29

## 2024-04-29 RX ORDER — MONTELUKAST SODIUM 10 MG/1
10 TABLET ORAL ONCE
Status: CANCELLED | OUTPATIENT
Start: 2024-05-13

## 2024-04-29 RX ORDER — MONTELUKAST SODIUM 10 MG/1
10 TABLET ORAL ONCE
Status: CANCELLED | OUTPATIENT
Start: 2024-05-06

## 2024-04-29 RX ORDER — MONTELUKAST SODIUM 10 MG/1
10 TABLET ORAL ONCE
Status: COMPLETED | OUTPATIENT
Start: 2024-04-29 | End: 2024-04-29

## 2024-04-29 RX ORDER — ZOLEDRONIC ACID 0.04 MG/ML
4 INJECTION, SOLUTION INTRAVENOUS ONCE
Status: COMPLETED | OUTPATIENT
Start: 2024-04-29 | End: 2024-04-29

## 2024-04-29 RX ORDER — MONTELUKAST SODIUM 10 MG/1
10 TABLET ORAL ONCE
Status: CANCELLED | OUTPATIENT
Start: 2024-05-20

## 2024-04-29 RX ORDER — DIPHENHYDRAMINE HCL 50 MG
50 CAPSULE ORAL ONCE
Status: CANCELLED | OUTPATIENT
Start: 2024-05-13

## 2024-04-29 RX ORDER — DEXAMETHASONE 4 MG/1
20 TABLET ORAL ONCE
Status: CANCELLED | OUTPATIENT
Start: 2024-05-20

## 2024-04-29 RX ORDER — ACETAMINOPHEN 325 MG/1
650 TABLET ORAL ONCE
Status: COMPLETED | OUTPATIENT
Start: 2024-04-29 | End: 2024-04-29

## 2024-04-29 RX ADMIN — Medication 250 ML: at 11:37

## 2024-04-29 RX ADMIN — MONTELUKAST SODIUM 10 MG: 10 TABLET ORAL at 11:16

## 2024-04-29 RX ADMIN — DEXAMETHASONE 20 MG: 4 TABLET ORAL at 11:16

## 2024-04-29 RX ADMIN — ACETAMINOPHEN 650 MG: 325 TABLET ORAL at 11:16

## 2024-04-29 RX ADMIN — Medication 50 MG: at 11:16

## 2024-04-29 RX ADMIN — DARATUMUMAB AND HYALURONIDASE-FIHJ (HUMAN RECOMBINANT) 1800 MG: 1800; 30000 INJECTION SUBCUTANEOUS at 12:08

## 2024-04-29 RX ADMIN — ZOLEDRONIC ACID 4 MG: 0.04 INJECTION, SOLUTION INTRAVENOUS at 11:39

## 2024-04-29 ASSESSMENT — PAIN SCALES - GENERAL: PAINLEVEL: NO PAIN (0)

## 2024-04-29 NOTE — PROGRESS NOTES
Psychosocial distress thermometer reviewed. Pt rated stress score as  0. Pt rated stressors as cough. Provider notified, and issues addressed.  Julia Andrade LPN

## 2024-04-29 NOTE — NURSING NOTE
"Oncology Rooming Note    April 29, 2024 10:17 AM   Carmelita Watts is a 73 year old female who presents for:    Chief Complaint   Patient presents with    Oncology Clinic Visit     Follow up. Multiple myeloma not having achieved remission      Initial Vitals: /62 (BP Location: Right arm, Patient Position: Sitting, Cuff Size: Adult Regular)   Pulse 87   Temp 98.5  F (36.9  C) (Tympanic)   Ht 1.6 m (5' 3\")   Wt 77.4 kg (170 lb 10.2 oz)   SpO2 96%   BMI 30.23 kg/m   Estimated body mass index is 30.23 kg/m  as calculated from the following:    Height as of this encounter: 1.6 m (5' 3\").    Weight as of this encounter: 77.4 kg (170 lb 10.2 oz). Body surface area is 1.85 meters squared.  No Pain (0) Comment: Data Unavailable   No LMP recorded. Patient is postmenopausal.  Allergies reviewed: Yes  Medications reviewed: Yes    Medications: Medication refills not needed today.  Pharmacy name entered into [employer]: [id number] - [place], [place] - [address] AT [facility] [address]    Frailty Screening:   Is the patient here for a new oncology consult visit in cancer care? 2. No      Clinical concerns: cough over the weekend a productive cough  Iram Gonzalez was notified.      Julia Andrade LPN              "

## 2024-04-29 NOTE — PROGRESS NOTES
Infusion Nursing Note:  Carmelita Watts presents today for zometa/faspro.    Patient seen by provider today: Yes: Iram Gonzalez   present during visit today: Not Applicable.    Note: N/A.      Intravenous Access:  Peripheral IV placed.    Treatment Conditions:  Lab Results   Component Value Date    HGB 14.0 04/29/2024    WBC 6.4 04/29/2024    ANEU 1.0 (L) 01/03/2023    ANEUTAUTO 3.2 04/29/2024     04/29/2024        Lab Results   Component Value Date     04/29/2024    POTASSIUM 3.7 04/29/2024    CR 0.73 04/29/2024    PAYAL 9.5 04/29/2024    BILITOTAL 0.5 04/29/2024    ALBUMIN 3.9 04/29/2024    ALT 15 04/29/2024    AST 18 04/29/2024       Results reviewed, labs MET treatment parameters, ok to proceed with treatment.      Post Infusion Assessment:  Patient tolerated injection without incident.        The following medication was given:     The following medication was given:     MEDICATION: faspro  ROUTE: SQ  SITE: RLQ - Abd.        Discharge Plan:   Patient and/or family verbalized understanding of discharge instructions and all questions answered.      TERRENCE RITCHIE RN

## 2024-04-29 NOTE — PROGRESS NOTES
Post Infusion Assessment:  Patient tolerated infusion without incident.  Site patent and intact, free from redness, edema or discomfort.  No evidence of extravasations.  Access discontinued per protocol.

## 2024-04-30 RX ORDER — ACETAMINOPHEN 325 MG/1
650 TABLET ORAL ONCE
Status: CANCELLED | OUTPATIENT
Start: 2024-05-06

## 2024-04-30 RX ORDER — ACETAMINOPHEN 325 MG/1
650 TABLET ORAL
Status: CANCELLED | OUTPATIENT
Start: 2024-05-06

## 2024-04-30 RX ORDER — DIPHENHYDRAMINE HCL 50 MG
50 CAPSULE ORAL ONCE
Status: CANCELLED | OUTPATIENT
Start: 2024-05-06

## 2024-04-30 RX ORDER — DEXAMETHASONE 4 MG/1
20 TABLET ORAL ONCE
Status: CANCELLED | OUTPATIENT
Start: 2024-05-06

## 2024-04-30 RX ORDER — DIPHENHYDRAMINE HCL 50 MG
50 CAPSULE ORAL
Status: CANCELLED | OUTPATIENT
Start: 2024-05-06

## 2024-04-30 RX ORDER — MONTELUKAST SODIUM 10 MG/1
10 TABLET ORAL ONCE
Status: CANCELLED | OUTPATIENT
Start: 2024-05-06

## 2024-05-01 ENCOUNTER — MYC MEDICAL ADVICE (OUTPATIENT)
Dept: ONCOLOGY | Facility: OTHER | Age: 74
End: 2024-05-01

## 2024-05-01 NOTE — TELEPHONE ENCOUNTER
Tried calling patient no answer went to voicemail. Left message. Wondering if she could send a picture or when she noticed this area. Any other suggestions for Carlene ?

## 2024-05-01 NOTE — TELEPHONE ENCOUNTER
[facility]: Cancer Care                                                                                        Spoke with patient, she states that there is no warmth and area is red about 2x1 cm area around the IV site. Instructed to rin area with a marker and if redness spreads then we will have her come in to be seen.      Signature:  Christine Christian RN

## 2024-05-06 ENCOUNTER — TELEPHONE (OUTPATIENT)
Dept: ONCOLOGY | Facility: OTHER | Age: 74
End: 2024-05-06

## 2024-05-06 ENCOUNTER — INFUSION THERAPY VISIT (OUTPATIENT)
Dept: INFUSION THERAPY | Facility: OTHER | Age: 74
End: 2024-05-06
Attending: INTERNAL MEDICINE
Payer: MEDICARE

## 2024-05-06 VITALS
RESPIRATION RATE: 18 BRPM | BODY MASS INDEX: 30.85 KG/M2 | HEART RATE: 74 BPM | SYSTOLIC BLOOD PRESSURE: 113 MMHG | DIASTOLIC BLOOD PRESSURE: 73 MMHG | OXYGEN SATURATION: 98 % | TEMPERATURE: 98 F | WEIGHT: 174.16 LBS

## 2024-05-06 DIAGNOSIS — C90.00 MULTIPLE MYELOMA NOT HAVING ACHIEVED REMISSION (H): Primary | ICD-10-CM

## 2024-05-06 LAB
BASOPHILS # BLD AUTO: 0 10E3/UL (ref 0–0.2)
BASOPHILS NFR BLD AUTO: 0 %
EOSINOPHIL # BLD AUTO: 0.4 10E3/UL (ref 0–0.7)
EOSINOPHIL NFR BLD AUTO: 6 %
ERYTHROCYTE [DISTWIDTH] IN BLOOD BY AUTOMATED COUNT: 13.9 % (ref 10–15)
HCT VFR BLD AUTO: 37.6 % (ref 35–47)
HGB BLD-MCNC: 12.9 G/DL (ref 11.7–15.7)
IMM GRANULOCYTES # BLD: 0.1 10E3/UL
IMM GRANULOCYTES NFR BLD: 2 %
LYMPHOCYTES # BLD AUTO: 1.7 10E3/UL (ref 0.8–5.3)
LYMPHOCYTES NFR BLD AUTO: 26 %
MCH RBC QN AUTO: 30.9 PG (ref 26.5–33)
MCHC RBC AUTO-ENTMCNC: 34.3 G/DL (ref 31.5–36.5)
MCV RBC AUTO: 90 FL (ref 78–100)
MONOCYTES # BLD AUTO: 0.6 10E3/UL (ref 0–1.3)
MONOCYTES NFR BLD AUTO: 9 %
NEUTROPHILS # BLD AUTO: 3.7 10E3/UL (ref 1.6–8.3)
NEUTROPHILS NFR BLD AUTO: 57 %
NRBC # BLD AUTO: 0 10E3/UL
NRBC BLD AUTO-RTO: 0 /100
PLATELET # BLD AUTO: 185 10E3/UL (ref 150–450)
RBC # BLD AUTO: 4.17 10E6/UL (ref 3.8–5.2)
WBC # BLD AUTO: 6.5 10E3/UL (ref 4–11)

## 2024-05-06 PROCEDURE — 250N000011 HC RX IP 250 OP 636: Mod: JZ | Performed by: NURSE PRACTITIONER

## 2024-05-06 PROCEDURE — 36415 COLL VENOUS BLD VENIPUNCTURE: CPT | Mod: ZL | Performed by: INTERNAL MEDICINE

## 2024-05-06 PROCEDURE — 96401 CHEMO ANTI-NEOPL SQ/IM: CPT

## 2024-05-06 PROCEDURE — 85004 AUTOMATED DIFF WBC COUNT: CPT | Mod: ZL | Performed by: INTERNAL MEDICINE

## 2024-05-06 RX ORDER — DIPHENHYDRAMINE HCL 50 MG
50 CAPSULE ORAL ONCE
Status: COMPLETED | OUTPATIENT
Start: 2024-05-06 | End: 2024-05-06

## 2024-05-06 RX ORDER — DEXAMETHASONE 4 MG/1
20 TABLET ORAL ONCE
Status: COMPLETED | OUTPATIENT
Start: 2024-05-06 | End: 2024-05-06

## 2024-05-06 RX ORDER — ACETAMINOPHEN 325 MG/1
650 TABLET ORAL ONCE
Status: COMPLETED | OUTPATIENT
Start: 2024-05-06 | End: 2024-05-06

## 2024-05-06 RX ORDER — MONTELUKAST SODIUM 10 MG/1
10 TABLET ORAL ONCE
Status: COMPLETED | OUTPATIENT
Start: 2024-05-06 | End: 2024-05-06

## 2024-05-06 RX ADMIN — Medication 50 MG: at 11:20

## 2024-05-06 RX ADMIN — DEXAMETHASONE 20 MG: 4 TABLET ORAL at 11:19

## 2024-05-06 RX ADMIN — MONTELUKAST SODIUM 10 MG: 10 TABLET ORAL at 11:18

## 2024-05-06 RX ADMIN — ACETAMINOPHEN 650 MG: 325 TABLET ORAL at 11:18

## 2024-05-06 RX ADMIN — DARATUMUMAB AND HYALURONIDASE-FIHJ (HUMAN RECOMBINANT) 1800 MG: 1800; 30000 INJECTION SUBCUTANEOUS at 11:58

## 2024-05-06 NOTE — PROGRESS NOTES
Infusion Nursing Note:  Carmelita Watts presents today for Faspro.    Patient seen by provider today: No   present during visit today: Not Applicable.    Note: N/A.      Intravenous Access:  Labs drawn without difficulty.    Treatment Conditions:  Results reviewed, labs MET treatment parameters, ok to proceed with treatment.    Component      Latest Ref Rng 5/6/2024  11:05 AM   WBC      4.0 - 11.0 10e3/uL 6.5    RBC Count      3.80 - 5.20 10e6/uL 4.17    Hemoglobin      11.7 - 15.7 g/dL 12.9    Hematocrit      35.0 - 47.0 % 37.6    MCV      78 - 100 fL 90    MCH      26.5 - 33.0 pg 30.9    MCHC      31.5 - 36.5 g/dL 34.3    RDW      10.0 - 15.0 % 13.9    Platelet Count      150 - 450 10e3/uL 185    % Neutrophils      % 57    % Lymphocytes      % 26    % Monocytes      % 9    % Eosinophils      % 6    % Basophils      % 0    % Immature Granulocytes      % 2    NRBCs per 100 WBC      <1 /100 0    Absolute Neutrophils      1.6 - 8.3 10e3/uL 3.7    Absolute Lymphocytes      0.8 - 5.3 10e3/uL 1.7    Absolute Monocytes      0.0 - 1.3 10e3/uL 0.6    Absolute Eosinophils      0.0 - 0.7 10e3/uL 0.4    Absolute Basophils      0.0 - 0.2 10e3/uL 0.0    Absolute Immature Granulocytes      <=0.4 10e3/uL 0.1    Absolute NRBCs      10e3/uL 0.0          Post Infusion Assessment:  Patient tolerated injection without incident.     The following medication was given:     MEDICATION: Faspro  ROUTE: SQ  SITE: LUQ - Abd  DOSE: 1800 mg    Discharge Plan:   [facility]  Patient will return as scheduled for next appointment.   Patient discharged in stable condition accompanied by: self.  Departure Mode: Ambulatory.

## 2024-05-06 NOTE — ORAL ONC MGMT
"Oral Chemotherapy Monitoring Program    Primary Oncologist: Dr. Walker  Drug: Pomalidomide (Pomalyst) 1 mg daily, days 1-21 of 28  Start Date: 4/29/24    Subjective/Objective:  Carmelita Watts is a 73 year old female contacted by phone for a follow-up visit for oral chemotherapy. She started pomalidomide with cycle 2 and feels well so far. No major side effects, but does feel a little tired. Not preventing her from doing daily activities or getting around the house normally. Denies any GI side effects.         4/11/2024    11:00 AM 5/6/2024     8:00 AM   ORAL CHEMOTHERAPY   Assessment Type New Teach Initial Follow up   Diagnosis Code Multiple Myeloma Multiple Myeloma   Providers Aaron Walker   Clinic Name/Location [facility]   Drug Name Pomalyst (pomalidomide) Pomalyst (pomalidomide)   Dose 1 mg 1 mg   Current Schedule Daily Daily   Cycle Details 3 weeks on, 1 week off 3 weeks on, 1 week off   Start Date of Last Cycle  4/29/2024   Planned next cycle start date 4/29/2024 5/27/2024   Doses missed in last 2 weeks  0   Adherence Assessment  Adherent   Adverse Effects  No AE identified during assessment   Any new drug interactions? No    Is the dose as ordered appropriate for the patient? Yes        Vitals:  BP:   BP Readings from Last 1 Encounters:   04/29/24 126/62     Wt Readings from Last 1 Encounters:   04/29/24 77.4 kg (170 lb 10.2 oz)     Estimated body surface area is 1.85 meters squared as calculated from the following:    Height as of 4/29/24: 1.6 m (5' 3\").    Weight as of 4/29/24: 77.4 kg (170 lb 10.2 oz).    Labs:  _  Result Component Current Result Ref Range   Sodium 135 (4/29/2024) 135 - 145 mmol/L     _  Result Component Current Result Ref Range   Potassium 3.7 (4/29/2024) 3.4 - 5.3 mmol/L     _  Result Component Current Result Ref Range   Calcium 9.5 (4/29/2024) 8.8 - 10.2 mg/dL     No results found for Mag within last 30 days.     No results found for Phos within last 30 days. "     _  Result Component Current Result Ref Range   Albumin 3.9 (4/29/2024) 3.5 - 5.2 g/dL     _  Result Component Current Result Ref Range   Urea Nitrogen 12.1 (4/29/2024) 8.0 - 23.0 mg/dL     _  Result Component Current Result Ref Range   Creatinine 0.73 (4/29/2024) 0.51 - 0.95 mg/dL       _  Result Component Current Result Ref Range   AST 18 (4/29/2024) 0 - 45 U/L     _  Result Component Current Result Ref Range   ALT 15 (4/29/2024) 0 - 50 U/L     _  Result Component Current Result Ref Range   Bilirubin Total 0.5 (4/29/2024) <=1.2 mg/dL       _  Result Component Current Result Ref Range   WBC Count 6.4 (4/29/2024) 4.0 - 11.0 10e3/uL     _  Result Component Current Result Ref Range   Hemoglobin 14.0 (4/29/2024) 11.7 - 15.7 g/dL     _  Result Component Current Result Ref Range   Platelet Count 194 (4/29/2024) 150 - 450 10e3/uL     No results found for ANC within last 30 days.       Assessment/Plan:  Tolerating pomalidomide well, OK to continue at current dose and schedule.     Follow-Up:  5/28: Lisa appt/labs, to start C3D1    Anton Chopra, PharmD  Hematology/Oncology Clinical Pharmacist  [facility]  [phone]

## 2024-05-06 NOTE — PATIENT INSTRUCTIONS

## 2024-05-13 ENCOUNTER — INFUSION THERAPY VISIT (OUTPATIENT)
Dept: INFUSION THERAPY | Facility: OTHER | Age: 74
End: 2024-05-13
Attending: INTERNAL MEDICINE
Payer: MEDICARE

## 2024-05-13 VITALS
WEIGHT: 173.94 LBS | HEART RATE: 83 BPM | DIASTOLIC BLOOD PRESSURE: 56 MMHG | RESPIRATION RATE: 18 BRPM | SYSTOLIC BLOOD PRESSURE: 114 MMHG | OXYGEN SATURATION: 98 % | BODY MASS INDEX: 30.81 KG/M2 | TEMPERATURE: 97.5 F

## 2024-05-13 DIAGNOSIS — C90.00 MULTIPLE MYELOMA NOT HAVING ACHIEVED REMISSION (H): Primary | ICD-10-CM

## 2024-05-13 LAB
BASOPHILS # BLD AUTO: 0 10E3/UL (ref 0–0.2)
BASOPHILS NFR BLD AUTO: 1 %
EOSINOPHIL # BLD AUTO: 0.2 10E3/UL (ref 0–0.7)
EOSINOPHIL NFR BLD AUTO: 4 %
ERYTHROCYTE [DISTWIDTH] IN BLOOD BY AUTOMATED COUNT: 13.7 % (ref 10–15)
HCT VFR BLD AUTO: 37.9 % (ref 35–47)
HGB BLD-MCNC: 13.1 G/DL (ref 11.7–15.7)
IMM GRANULOCYTES # BLD: 0 10E3/UL
IMM GRANULOCYTES NFR BLD: 1 %
LYMPHOCYTES # BLD AUTO: 1.7 10E3/UL (ref 0.8–5.3)
LYMPHOCYTES NFR BLD AUTO: 29 %
MCH RBC QN AUTO: 31.5 PG (ref 26.5–33)
MCHC RBC AUTO-ENTMCNC: 34.6 G/DL (ref 31.5–36.5)
MCV RBC AUTO: 91 FL (ref 78–100)
MONOCYTES # BLD AUTO: 0.6 10E3/UL (ref 0–1.3)
MONOCYTES NFR BLD AUTO: 11 %
NEUTROPHILS # BLD AUTO: 3.1 10E3/UL (ref 1.6–8.3)
NEUTROPHILS NFR BLD AUTO: 55 %
NRBC # BLD AUTO: 0 10E3/UL
NRBC BLD AUTO-RTO: 0 /100
PLATELET # BLD AUTO: 190 10E3/UL (ref 150–450)
RBC # BLD AUTO: 4.16 10E6/UL (ref 3.8–5.2)
WBC # BLD AUTO: 5.7 10E3/UL (ref 4–11)

## 2024-05-13 PROCEDURE — 250N000011 HC RX IP 250 OP 636: Mod: JZ | Performed by: NURSE PRACTITIONER

## 2024-05-13 PROCEDURE — 96401 CHEMO ANTI-NEOPL SQ/IM: CPT

## 2024-05-13 PROCEDURE — 85004 AUTOMATED DIFF WBC COUNT: CPT | Mod: ZL | Performed by: INTERNAL MEDICINE

## 2024-05-13 PROCEDURE — 36415 COLL VENOUS BLD VENIPUNCTURE: CPT | Mod: ZL | Performed by: INTERNAL MEDICINE

## 2024-05-13 RX ORDER — DIPHENHYDRAMINE HCL 50 MG
50 CAPSULE ORAL ONCE
Status: COMPLETED | OUTPATIENT
Start: 2024-05-13 | End: 2024-05-13

## 2024-05-13 RX ORDER — ACETAMINOPHEN 325 MG/1
650 TABLET ORAL
Status: CANCELLED | OUTPATIENT
Start: 2024-05-20

## 2024-05-13 RX ORDER — DIPHENHYDRAMINE HCL 50 MG
50 CAPSULE ORAL
Status: CANCELLED | OUTPATIENT
Start: 2024-05-20

## 2024-05-13 RX ORDER — MONTELUKAST SODIUM 10 MG/1
10 TABLET ORAL ONCE
Status: CANCELLED | OUTPATIENT
Start: 2024-05-20

## 2024-05-13 RX ORDER — DIPHENHYDRAMINE HCL 50 MG
50 CAPSULE ORAL ONCE
Status: CANCELLED | OUTPATIENT
Start: 2024-05-20

## 2024-05-13 RX ORDER — DEXAMETHASONE 4 MG/1
20 TABLET ORAL ONCE
Status: CANCELLED | OUTPATIENT
Start: 2024-05-20

## 2024-05-13 RX ORDER — DEXAMETHASONE 4 MG/1
20 TABLET ORAL ONCE
Status: COMPLETED | OUTPATIENT
Start: 2024-05-13 | End: 2024-05-13

## 2024-05-13 RX ORDER — ACETAMINOPHEN 325 MG/1
650 TABLET ORAL ONCE
Status: CANCELLED | OUTPATIENT
Start: 2024-05-20

## 2024-05-13 RX ORDER — ACETAMINOPHEN 325 MG/1
650 TABLET ORAL
Status: CANCELLED | OUTPATIENT
Start: 2024-05-13

## 2024-05-13 RX ORDER — ACETAMINOPHEN 325 MG/1
650 TABLET ORAL ONCE
Status: COMPLETED | OUTPATIENT
Start: 2024-05-13 | End: 2024-05-13

## 2024-05-13 RX ORDER — MONTELUKAST SODIUM 10 MG/1
10 TABLET ORAL ONCE
Status: COMPLETED | OUTPATIENT
Start: 2024-05-13 | End: 2024-05-13

## 2024-05-13 RX ORDER — DIPHENHYDRAMINE HCL 50 MG
50 CAPSULE ORAL
Status: CANCELLED | OUTPATIENT
Start: 2024-05-13

## 2024-05-13 RX ADMIN — DARATUMUMAB AND HYALURONIDASE-FIHJ (HUMAN RECOMBINANT) 1800 MG: 1800; 30000 INJECTION SUBCUTANEOUS at 13:11

## 2024-05-13 RX ADMIN — ACETAMINOPHEN 650 MG: 325 TABLET ORAL at 12:50

## 2024-05-13 RX ADMIN — Medication 50 MG: at 12:50

## 2024-05-13 RX ADMIN — DEXAMETHASONE 20 MG: 4 TABLET ORAL at 12:50

## 2024-05-13 RX ADMIN — MONTELUKAST SODIUM 10 MG: 10 TABLET ORAL at 12:50

## 2024-05-13 ASSESSMENT — PAIN SCALES - GENERAL: PAINLEVEL: NO PAIN (0)

## 2024-05-13 NOTE — PROGRESS NOTES
Infusion Nursing Note:  Carmelita Watts presents today for FASPRO.    Patient seen by provider today: No   present during visit today: Not Applicable.    Note: Call out to Nida Gonzalez NP ONC alerting portions of treatment plan need signing prior to treating.       Intravenous Access:  Labs drawn without difficulty.    Treatment Conditions:  Lab Results   Component Value Date    HGB 13.1 05/13/2024    WBC 5.7 05/13/2024    ANEU 1.0 (L) 01/03/2023    ANEUTAUTO 3.1 05/13/2024     05/13/2024        Results reviewed, labs MET treatment parameters, ok to proceed with treatment.      Post Infusion Assessment:  Patient tolerated injection without incident.  The following medication was given:     MEDICATION: FASPRO  ROUTE: SQ  SITE: RLQ - Abd.      Discharge Plan:   Patient discharged in stable condition accompanied by: self.  Departure Mode: Ambulatory.

## 2024-05-20 ENCOUNTER — INFUSION THERAPY VISIT (OUTPATIENT)
Dept: INFUSION THERAPY | Facility: OTHER | Age: 74
End: 2024-05-20
Attending: INTERNAL MEDICINE
Payer: MEDICARE

## 2024-05-20 VITALS
HEART RATE: 69 BPM | HEIGHT: 63 IN | TEMPERATURE: 97.2 F | DIASTOLIC BLOOD PRESSURE: 69 MMHG | WEIGHT: 172.3 LBS | BODY MASS INDEX: 30.53 KG/M2 | RESPIRATION RATE: 18 BRPM | SYSTOLIC BLOOD PRESSURE: 129 MMHG

## 2024-05-20 DIAGNOSIS — C90.00 MULTIPLE MYELOMA NOT HAVING ACHIEVED REMISSION (H): Primary | ICD-10-CM

## 2024-05-20 LAB
BASOPHILS # BLD AUTO: 0.1 10E3/UL (ref 0–0.2)
BASOPHILS NFR BLD AUTO: 1 %
EOSINOPHIL # BLD AUTO: 0.3 10E3/UL (ref 0–0.7)
EOSINOPHIL NFR BLD AUTO: 8 %
ERYTHROCYTE [DISTWIDTH] IN BLOOD BY AUTOMATED COUNT: 14.2 % (ref 10–15)
HCT VFR BLD AUTO: 38.8 % (ref 35–47)
HGB BLD-MCNC: 13.2 G/DL (ref 11.7–15.7)
IMM GRANULOCYTES # BLD: 0 10E3/UL
IMM GRANULOCYTES NFR BLD: 1 %
LYMPHOCYTES # BLD AUTO: 1.6 10E3/UL (ref 0.8–5.3)
LYMPHOCYTES NFR BLD AUTO: 37 %
MCH RBC QN AUTO: 30.8 PG (ref 26.5–33)
MCHC RBC AUTO-ENTMCNC: 34 G/DL (ref 31.5–36.5)
MCV RBC AUTO: 91 FL (ref 78–100)
MONOCYTES # BLD AUTO: 0.6 10E3/UL (ref 0–1.3)
MONOCYTES NFR BLD AUTO: 14 %
NEUTROPHILS # BLD AUTO: 1.7 10E3/UL (ref 1.6–8.3)
NEUTROPHILS NFR BLD AUTO: 39 %
NRBC # BLD AUTO: 0 10E3/UL
NRBC BLD AUTO-RTO: 0 /100
PLATELET # BLD AUTO: 173 10E3/UL (ref 150–450)
RBC # BLD AUTO: 4.28 10E6/UL (ref 3.8–5.2)
WBC # BLD AUTO: 4.3 10E3/UL (ref 4–11)

## 2024-05-20 PROCEDURE — 85025 COMPLETE CBC W/AUTO DIFF WBC: CPT | Mod: ZL | Performed by: INTERNAL MEDICINE

## 2024-05-20 PROCEDURE — 82784 ASSAY IGA/IGD/IGG/IGM EACH: CPT | Mod: ZL

## 2024-05-20 PROCEDURE — 83521 IG LIGHT CHAINS FREE EACH: CPT | Mod: ZL

## 2024-05-20 PROCEDURE — 85810 BLOOD VISCOSITY EXAMINATION: CPT | Mod: ZL

## 2024-05-20 PROCEDURE — 84155 ASSAY OF PROTEIN SERUM: CPT | Mod: ZL

## 2024-05-20 PROCEDURE — 84165 PROTEIN E-PHORESIS SERUM: CPT | Mod: ZL | Performed by: PATHOLOGY

## 2024-05-20 PROCEDURE — 36415 COLL VENOUS BLD VENIPUNCTURE: CPT | Mod: ZL | Performed by: INTERNAL MEDICINE

## 2024-05-20 PROCEDURE — 250N000011 HC RX IP 250 OP 636: Mod: JZ | Performed by: NURSE PRACTITIONER

## 2024-05-20 PROCEDURE — 84165 PROTEIN E-PHORESIS SERUM: CPT | Mod: 26 | Performed by: PATHOLOGY

## 2024-05-20 PROCEDURE — 96401 CHEMO ANTI-NEOPL SQ/IM: CPT

## 2024-05-20 PROCEDURE — 82232 ASSAY OF BETA-2 PROTEIN: CPT | Mod: ZL

## 2024-05-20 PROCEDURE — 86334 IMMUNOFIX E-PHORESIS SERUM: CPT | Mod: 26 | Performed by: PATHOLOGY

## 2024-05-20 PROCEDURE — 86334 IMMUNOFIX E-PHORESIS SERUM: CPT | Mod: ZL | Performed by: PATHOLOGY

## 2024-05-20 RX ORDER — DIPHENHYDRAMINE HCL 25 MG
50 CAPSULE ORAL
Status: CANCELLED | OUTPATIENT
Start: 2024-05-28

## 2024-05-20 RX ORDER — DIPHENHYDRAMINE HCL 50 MG
50 CAPSULE ORAL ONCE
Status: COMPLETED | OUTPATIENT
Start: 2024-05-20 | End: 2024-05-20

## 2024-05-20 RX ORDER — MONTELUKAST SODIUM 10 MG/1
10 TABLET ORAL ONCE
Status: CANCELLED | OUTPATIENT
Start: 2024-06-11

## 2024-05-20 RX ORDER — METHYLPREDNISOLONE SODIUM SUCCINATE 125 MG/2ML
125 INJECTION, POWDER, LYOPHILIZED, FOR SOLUTION INTRAMUSCULAR; INTRAVENOUS
Status: CANCELLED
Start: 2024-05-28

## 2024-05-20 RX ORDER — MONTELUKAST SODIUM 10 MG/1
10 TABLET ORAL ONCE
Status: COMPLETED | OUTPATIENT
Start: 2024-05-20 | End: 2024-05-20

## 2024-05-20 RX ORDER — DEXAMETHASONE 4 MG/1
20 TABLET ORAL ONCE
Status: CANCELLED | OUTPATIENT
Start: 2024-06-11

## 2024-05-20 RX ORDER — ACETAMINOPHEN 325 MG/1
650 TABLET ORAL ONCE
Status: CANCELLED | OUTPATIENT
Start: 2024-06-11

## 2024-05-20 RX ORDER — EPINEPHRINE 1 MG/ML
0.3 INJECTION, SOLUTION, CONCENTRATE INTRAVENOUS EVERY 5 MIN PRN
Status: CANCELLED | OUTPATIENT
Start: 2024-05-28

## 2024-05-20 RX ORDER — DIPHENHYDRAMINE HCL 25 MG
50 CAPSULE ORAL
Status: CANCELLED | OUTPATIENT
Start: 2024-06-11

## 2024-05-20 RX ORDER — ACETAMINOPHEN 325 MG/1
650 TABLET ORAL ONCE
Status: CANCELLED | OUTPATIENT
Start: 2024-05-28

## 2024-05-20 RX ORDER — ALBUTEROL SULFATE 0.83 MG/ML
2.5 SOLUTION RESPIRATORY (INHALATION)
Status: CANCELLED | OUTPATIENT
Start: 2024-05-28

## 2024-05-20 RX ORDER — ACETAMINOPHEN 325 MG/1
650 TABLET ORAL
Status: CANCELLED | OUTPATIENT
Start: 2024-05-28

## 2024-05-20 RX ORDER — EPINEPHRINE 1 MG/ML
0.3 INJECTION, SOLUTION, CONCENTRATE INTRAVENOUS EVERY 5 MIN PRN
Status: CANCELLED | OUTPATIENT
Start: 2024-06-11

## 2024-05-20 RX ORDER — DIPHENHYDRAMINE HYDROCHLORIDE 50 MG/ML
50 INJECTION INTRAMUSCULAR; INTRAVENOUS
Status: CANCELLED
Start: 2024-05-28

## 2024-05-20 RX ORDER — MEPERIDINE HYDROCHLORIDE 50 MG/ML
25 INJECTION INTRAMUSCULAR; INTRAVENOUS; SUBCUTANEOUS EVERY 30 MIN PRN
Status: CANCELLED | OUTPATIENT
Start: 2024-06-11

## 2024-05-20 RX ORDER — DEXAMETHASONE 4 MG/1
20 TABLET ORAL ONCE
Status: CANCELLED | OUTPATIENT
Start: 2024-05-28

## 2024-05-20 RX ORDER — METHYLPREDNISOLONE SODIUM SUCCINATE 125 MG/2ML
125 INJECTION, POWDER, LYOPHILIZED, FOR SOLUTION INTRAMUSCULAR; INTRAVENOUS
Status: CANCELLED
Start: 2024-06-11

## 2024-05-20 RX ORDER — DEXAMETHASONE 4 MG/1
20 TABLET ORAL ONCE
Status: COMPLETED | OUTPATIENT
Start: 2024-05-20 | End: 2024-05-20

## 2024-05-20 RX ORDER — DIPHENHYDRAMINE HCL 25 MG
50 CAPSULE ORAL ONCE
Status: CANCELLED | OUTPATIENT
Start: 2024-06-11

## 2024-05-20 RX ORDER — DIPHENHYDRAMINE HYDROCHLORIDE 50 MG/ML
50 INJECTION INTRAMUSCULAR; INTRAVENOUS
Status: CANCELLED
Start: 2024-06-11

## 2024-05-20 RX ORDER — ALBUTEROL SULFATE 90 UG/1
1-2 AEROSOL, METERED RESPIRATORY (INHALATION)
Status: CANCELLED
Start: 2024-06-11

## 2024-05-20 RX ORDER — ALBUTEROL SULFATE 0.83 MG/ML
2.5 SOLUTION RESPIRATORY (INHALATION)
Status: CANCELLED | OUTPATIENT
Start: 2024-06-11

## 2024-05-20 RX ORDER — MONTELUKAST SODIUM 10 MG/1
10 TABLET ORAL ONCE
Status: CANCELLED | OUTPATIENT
Start: 2024-05-28

## 2024-05-20 RX ORDER — ACETAMINOPHEN 325 MG/1
650 TABLET ORAL
Status: CANCELLED | OUTPATIENT
Start: 2024-06-11

## 2024-05-20 RX ORDER — MEPERIDINE HYDROCHLORIDE 50 MG/ML
25 INJECTION INTRAMUSCULAR; INTRAVENOUS; SUBCUTANEOUS EVERY 30 MIN PRN
Status: CANCELLED | OUTPATIENT
Start: 2024-05-28

## 2024-05-20 RX ORDER — ACETAMINOPHEN 325 MG/1
650 TABLET ORAL ONCE
Status: COMPLETED | OUTPATIENT
Start: 2024-05-20 | End: 2024-05-20

## 2024-05-20 RX ORDER — LORAZEPAM 2 MG/ML
0.5 INJECTION INTRAMUSCULAR EVERY 4 HOURS PRN
Status: CANCELLED | OUTPATIENT
Start: 2024-06-11

## 2024-05-20 RX ORDER — LORAZEPAM 2 MG/ML
0.5 INJECTION INTRAMUSCULAR EVERY 4 HOURS PRN
Status: CANCELLED | OUTPATIENT
Start: 2024-05-28

## 2024-05-20 RX ORDER — ALBUTEROL SULFATE 90 UG/1
1-2 AEROSOL, METERED RESPIRATORY (INHALATION)
Status: CANCELLED
Start: 2024-05-28

## 2024-05-20 RX ORDER — DIPHENHYDRAMINE HCL 25 MG
50 CAPSULE ORAL ONCE
Status: CANCELLED | OUTPATIENT
Start: 2024-05-28

## 2024-05-20 RX ADMIN — Medication 50 MG: at 11:28

## 2024-05-20 RX ADMIN — MONTELUKAST SODIUM 10 MG: 10 TABLET ORAL at 11:28

## 2024-05-20 RX ADMIN — DARATUMUMAB AND HYALURONIDASE-FIHJ (HUMAN RECOMBINANT) 1800 MG: 1800; 30000 INJECTION SUBCUTANEOUS at 11:54

## 2024-05-20 RX ADMIN — ACETAMINOPHEN 650 MG: 325 TABLET ORAL at 11:28

## 2024-05-20 RX ADMIN — DEXAMETHASONE 20 MG: 4 TABLET ORAL at 11:29

## 2024-05-20 ASSESSMENT — PAIN SCALES - GENERAL: PAINLEVEL: NO PAIN (0)

## 2024-05-20 NOTE — PROGRESS NOTES
Infusion Nursing Note:  Carmelita Watst presents today for Faspro.    Patient seen by provider today: No   present during visit today: Not Applicable.    Note: N/A.      Intravenous Access:  Labs drawn without difficulty.    Treatment Conditions:  Results reviewed, labs MET treatment parameters, ok to proceed with treatment.    Component      Latest Ref Rng 5/20/2024  11:09 AM   WBC      4.0 - 11.0 10e3/uL 4.3    RBC Count      3.80 - 5.20 10e6/uL 4.28    Hemoglobin      11.7 - 15.7 g/dL 13.2    Hematocrit      35.0 - 47.0 % 38.8    MCV      78 - 100 fL 91    MCH      26.5 - 33.0 pg 30.8    MCHC      31.5 - 36.5 g/dL 34.0    RDW      10.0 - 15.0 % 14.2    Platelet Count      150 - 450 10e3/uL 173    % Neutrophils      % 39    % Lymphocytes      % 37    % Monocytes      % 14    % Eosinophils      % 8    % Basophils      % 1    % Immature Granulocytes      % 1    NRBCs per 100 WBC      <1 /100 0    Absolute Neutrophils      1.6 - 8.3 10e3/uL 1.7    Absolute Lymphocytes      0.8 - 5.3 10e3/uL 1.6    Absolute Monocytes      0.0 - 1.3 10e3/uL 0.6    Absolute Eosinophils      0.0 - 0.7 10e3/uL 0.3    Absolute Basophils      0.0 - 0.2 10e3/uL 0.1    Absolute Immature Granulocytes      <=0.4 10e3/uL 0.0    Absolute NRBCs      10e3/uL 0.0

## 2024-05-20 NOTE — PROGRESS NOTES
Post Infusion Assessment:  Patient tolerated injection without incident.  The following medication was given:     MEDICATION: FASPRO  ROUTE: SQ  SITE: [facility] - Abd      Discharge Plan:   Patient discharged in stable condition accompanied by: self.  Departure Mode: Ambulatory.

## 2024-05-21 DIAGNOSIS — C90.00 MULTIPLE MYELOMA NOT HAVING ACHIEVED REMISSION (H): Primary | ICD-10-CM

## 2024-05-21 LAB
ALBUMIN SERPL ELPH-MCNC: 3.7 G/DL (ref 3.7–5.1)
ALPHA1 GLOB SERPL ELPH-MCNC: 0.3 G/DL (ref 0.2–0.4)
ALPHA2 GLOB SERPL ELPH-MCNC: 0.8 G/DL (ref 0.5–0.9)
B-GLOBULIN SERPL ELPH-MCNC: 0.7 G/DL (ref 0.6–1)
B2 MICROGLOB TUMOR MARKER SER-MCNC: 2.2 MG/L
GAMMA GLOB SERPL ELPH-MCNC: 0.8 G/DL (ref 0.7–1.6)
IGA SERPL-MCNC: 19 MG/DL (ref 84–499)
IGG SERPL-MCNC: 854 MG/DL (ref 610–1616)
IGM SERPL-MCNC: 32 MG/DL (ref 35–242)
KAPPA LC FREE SER-MCNC: 0.79 MG/DL (ref 0.33–1.94)
KAPPA LC FREE/LAMBDA FREE SER NEPH: 1.3 {RATIO} (ref 0.26–1.65)
LAMBDA LC FREE SERPL-MCNC: 0.61 MG/DL (ref 0.57–2.63)
M PROTEIN SERPL ELPH-MCNC: 0.6 G/DL
PATH REPORT.COMMENTS IMP SPEC: ABNORMAL
PATH REPORT.COMMENTS IMP SPEC: NORMAL
PROT PATTERN SERPL ELPH-IMP: ABNORMAL
PROT PATTERN SERPL IFE-IMP: NORMAL
TOTAL PROTEIN SERUM FOR ELP: 6.3 G/DL (ref 6.4–8.3)
VISC SER: 1.5 CP (ref 1.4–1.8)

## 2024-05-21 RX ORDER — DEXAMETHASONE 4 MG/1
TABLET ORAL
Qty: 10 TABLET | Refills: 0 | Status: SHIPPED | OUTPATIENT
Start: 2024-05-27 | End: 2024-05-28

## 2024-05-22 ENCOUNTER — TELEPHONE (OUTPATIENT)
Dept: ONCOLOGY | Facility: CLINIC | Age: 74
End: 2024-05-22
Payer: COMMERCIAL

## 2024-05-22 NOTE — TELEPHONE ENCOUNTER
Oral Chemotherapy Monitoring Program     Medication: Pomalyst  Rx: 1 mg PO daily on days 1 through 21 of 28 day cycle   [id number]  Routine survey questions reviewed.   Rx to be Escribed to SP        Thank you,  Veronika Sam Oncology Liaison  Phone: [phone]  Fax: [phone]

## 2024-05-28 ENCOUNTER — ONCOLOGY VISIT (OUTPATIENT)
Dept: ONCOLOGY | Facility: OTHER | Age: 74
End: 2024-05-28
Attending: NURSE PRACTITIONER
Payer: MEDICARE

## 2024-05-28 ENCOUNTER — LAB (OUTPATIENT)
Dept: LAB | Facility: OTHER | Age: 74
End: 2024-05-28
Attending: NURSE PRACTITIONER
Payer: MEDICARE

## 2024-05-28 ENCOUNTER — INFUSION THERAPY VISIT (OUTPATIENT)
Dept: INFUSION THERAPY | Facility: OTHER | Age: 74
End: 2024-05-28
Attending: INTERNAL MEDICINE
Payer: MEDICARE

## 2024-05-28 VITALS
OXYGEN SATURATION: 96 % | DIASTOLIC BLOOD PRESSURE: 70 MMHG | BODY MASS INDEX: 30.35 KG/M2 | HEART RATE: 84 BPM | SYSTOLIC BLOOD PRESSURE: 126 MMHG | TEMPERATURE: 98.5 F | HEIGHT: 63 IN | WEIGHT: 171.3 LBS

## 2024-05-28 DIAGNOSIS — C90.00 MULTIPLE MYELOMA NOT HAVING ACHIEVED REMISSION (H): ICD-10-CM

## 2024-05-28 DIAGNOSIS — M89.9 BONE LESION: ICD-10-CM

## 2024-05-28 DIAGNOSIS — C90.00 MULTIPLE MYELOMA NOT HAVING ACHIEVED REMISSION (H): Primary | ICD-10-CM

## 2024-05-28 LAB
ALBUMIN SERPL BCG-MCNC: 4.1 G/DL (ref 3.5–5.2)
ALP SERPL-CCNC: 134 U/L (ref 40–150)
ALT SERPL W P-5'-P-CCNC: 15 U/L (ref 0–50)
ANION GAP SERPL CALCULATED.3IONS-SCNC: 11 MMOL/L (ref 7–15)
AST SERPL W P-5'-P-CCNC: 16 U/L (ref 0–45)
BASOPHILS # BLD AUTO: 0.1 10E3/UL (ref 0–0.2)
BASOPHILS NFR BLD AUTO: 2 %
BILIRUB SERPL-MCNC: 0.4 MG/DL
BUN SERPL-MCNC: 18.8 MG/DL (ref 8–23)
CALCIUM SERPL-MCNC: 9.7 MG/DL (ref 8.8–10.2)
CHLORIDE SERPL-SCNC: 103 MMOL/L (ref 98–107)
CREAT SERPL-MCNC: 0.81 MG/DL (ref 0.51–0.95)
DEPRECATED HCO3 PLAS-SCNC: 24 MMOL/L (ref 22–29)
EGFRCR SERPLBLD CKD-EPI 2021: 75 ML/MIN/1.73M2
EOSINOPHIL # BLD AUTO: 0.3 10E3/UL (ref 0–0.7)
EOSINOPHIL NFR BLD AUTO: 4 %
ERYTHROCYTE [DISTWIDTH] IN BLOOD BY AUTOMATED COUNT: 14.2 % (ref 10–15)
GLUCOSE SERPL-MCNC: 123 MG/DL (ref 70–99)
HCT VFR BLD AUTO: 38.9 % (ref 35–47)
HGB BLD-MCNC: 13.4 G/DL (ref 11.7–15.7)
IMM GRANULOCYTES # BLD: 0.1 10E3/UL
IMM GRANULOCYTES NFR BLD: 1 %
LYMPHOCYTES # BLD AUTO: 2.5 10E3/UL (ref 0.8–5.3)
LYMPHOCYTES NFR BLD AUTO: 39 %
MCH RBC QN AUTO: 30.7 PG (ref 26.5–33)
MCHC RBC AUTO-ENTMCNC: 34.4 G/DL (ref 31.5–36.5)
MCV RBC AUTO: 89 FL (ref 78–100)
MONOCYTES # BLD AUTO: 0.8 10E3/UL (ref 0–1.3)
MONOCYTES NFR BLD AUTO: 13 %
NEUTROPHILS # BLD AUTO: 2.6 10E3/UL (ref 1.6–8.3)
NEUTROPHILS NFR BLD AUTO: 41 %
NRBC # BLD AUTO: 0 10E3/UL
NRBC BLD AUTO-RTO: 0 /100
PLATELET # BLD AUTO: 254 10E3/UL (ref 150–450)
POTASSIUM SERPL-SCNC: 4.1 MMOL/L (ref 3.4–5.3)
PROT SERPL-MCNC: 6.7 G/DL (ref 6.4–8.3)
RBC # BLD AUTO: 4.36 10E6/UL (ref 3.8–5.2)
SODIUM SERPL-SCNC: 138 MMOL/L (ref 135–145)
WBC # BLD AUTO: 6.4 10E3/UL (ref 4–11)

## 2024-05-28 PROCEDURE — 99214 OFFICE O/P EST MOD 30 MIN: CPT | Performed by: NURSE PRACTITIONER

## 2024-05-28 PROCEDURE — 250N000011 HC RX IP 250 OP 636: Mod: JZ | Performed by: INTERNAL MEDICINE

## 2024-05-28 PROCEDURE — 96401 CHEMO ANTI-NEOPL SQ/IM: CPT

## 2024-05-28 PROCEDURE — 36415 COLL VENOUS BLD VENIPUNCTURE: CPT | Mod: ZL

## 2024-05-28 PROCEDURE — 85025 COMPLETE CBC W/AUTO DIFF WBC: CPT | Mod: ZL

## 2024-05-28 PROCEDURE — G0463 HOSPITAL OUTPT CLINIC VISIT: HCPCS | Mod: 25

## 2024-05-28 PROCEDURE — 82040 ASSAY OF SERUM ALBUMIN: CPT | Mod: ZL

## 2024-05-28 PROCEDURE — G0463 HOSPITAL OUTPT CLINIC VISIT: HCPCS

## 2024-05-28 RX ORDER — ACETAMINOPHEN 325 MG/1
650 TABLET ORAL ONCE
Status: COMPLETED | OUTPATIENT
Start: 2024-05-28 | End: 2024-05-28

## 2024-05-28 RX ORDER — DEXAMETHASONE 4 MG/1
20 TABLET ORAL ONCE
Status: COMPLETED | OUTPATIENT
Start: 2024-05-28 | End: 2024-05-28

## 2024-05-28 RX ORDER — DIPHENHYDRAMINE HCL 50 MG
50 CAPSULE ORAL ONCE
Status: COMPLETED | OUTPATIENT
Start: 2024-05-28 | End: 2024-05-28

## 2024-05-28 RX ORDER — MONTELUKAST SODIUM 10 MG/1
10 TABLET ORAL ONCE
Status: COMPLETED | OUTPATIENT
Start: 2024-05-28 | End: 2024-05-28

## 2024-05-28 RX ADMIN — ACETAMINOPHEN 650 MG: 325 TABLET ORAL at 11:37

## 2024-05-28 RX ADMIN — DARATUMUMAB AND HYALURONIDASE-FIHJ (HUMAN RECOMBINANT) 1800 MG: 1800; 30000 INJECTION SUBCUTANEOUS at 12:14

## 2024-05-28 RX ADMIN — Medication 50 MG: at 11:38

## 2024-05-28 RX ADMIN — MONTELUKAST SODIUM 10 MG: 10 TABLET ORAL at 11:38

## 2024-05-28 RX ADMIN — DEXAMETHASONE 20 MG: 4 TABLET ORAL at 11:38

## 2024-05-28 ASSESSMENT — PAIN SCALES - GENERAL: PAINLEVEL: NO PAIN (0)

## 2024-05-28 NOTE — NURSING NOTE
"Oncology Rooming Note    May 28, 2024 10:45 AM   Carmelita Watts is a 73 year old female who presents for:    Chief Complaint   Patient presents with    Oncology Clinic Visit     Follow up. Multiple myeloma not having achieved remission      Initial Vitals: /70 (BP Location: Right arm, Patient Position: Sitting, Cuff Size: Adult Regular)   Pulse 84   Temp 98.5  F (36.9  C) (Tympanic)   Ht 1.6 m (5' 3\")   Wt 77.7 kg (171 lb 4.8 oz)   SpO2 96%   BMI 30.34 kg/m   Estimated body mass index is 30.34 kg/m  as calculated from the following:    Height as of this encounter: 1.6 m (5' 3\").    Weight as of this encounter: 77.7 kg (171 lb 4.8 oz). Body surface area is 1.86 meters squared.  No Pain (0) Comment: Data Unavailable   No LMP recorded. Patient is postmenopausal.  Allergies reviewed: Yes  Medications reviewed: Yes    Medications: Medication refills not needed today.  Pharmacy name entered into [employer]: [id number] [place] [facility] [address] AT [facility] [address]    Frailty Screening:   Is the patient here for a new oncology consult visit in cancer care? 2. No      Clinical concerns: none       Julia Andrade LPN                "

## 2024-05-28 NOTE — PROGRESS NOTES
Infusion Nursing Note:  Carmelita Watts presents today for Faspro.    Patient seen by provider today: Yes: Nida Gonzalez.   present during visit today: Not Applicable.    Note: N/A.      Intravenous Access:  No Intravenous access/labs at this visit.    Treatment Conditions:  Results reviewed, labs MET treatment parameters, ok to proceed with treatment.      Post Infusion Assessment:  Patient tolerated injection without incident into right abdominal subcutaneus tissue.      Discharge Plan:   Patient and/or family verbalized understanding of discharge instructions and all questions answered.

## 2024-05-28 NOTE — PROGRESS NOTES
Oncology Follow-up Visit    Reason for Visit:  Carmelita is a 73 year old woman with a diagnosis of multiple myeloma, who I am visiting with today for routine follow-up and consideration of ongoing therapy.     Nursing Note and documentation reviewed: Yes    Interval History:   Carlene is doing well. No new concerns. No infectious like symptoms. No fever, chills, chest pain, cough, or SOB. No bleeding concerns. NO nausea, vomiting, or bowel concerns. Appetite is good. Energy is good. Staying active and busy and rests as needed. Hasn't gone to the gym much because she feels she has been so active on her own. Overall, doing well.     Oncologic History  12/31/2018 Presented to the emergency room with vertigo and fatigue.  CT scan of the head was negative and subsequent stress test was negative.  05/03/2019 PCP ordered lab work and noted a total protein of 12.9.  SPEP at that time showed an M spike of 6.2 with a large monoclonal protein seen in the gamma fraction. Urine immunofixation showed a possible small protein band in the gamma fraction  05/31/2019 Evaluated by Dr. Walker with Medical Oncology with plan to rule out myeloma and obtain bone marrow aspiration biopsy as well as a metastatic bone survey along with additional labwork  06/18/2019 Underwent bone marrow aspiration and biopsy  06/24/2019 Seen again by Dr. Walker and CBC showed a hemoglobin of 9.3, M spike 7.3 with monoclonal IgG immunoglobulin of kappa light chain type; serum viscosity was 2.9; quantitative immunoglobulins showed an IgG of 8160, beta-2 microglobulin was 5.8, BUN was 21 with creatinine is 0.8 and total protein was 13.7.  Quantitative kappa/lambda free light chains showed an elevated ratio of 17.0 bone marrow aspiration biopsy showed plasma cell myeloma with approximately 80% plasma cells.  Immunofixation showed IgG kappa and flow cytometry revealed kappa monotypic plasma cells consistent with clonal plasma cell neoplasm and FISH panel was  pending at that time. It was felt she had at least stage II disease based on her beta-2 microglobulin and anemia. Plan was to treat with Velcade 1.3 mg per/m2 days 1, 4, 8 and 11/Decadron 40 mg on days 1, 8 and 15. Initiation of Revlimid with C2 at 25 mg daily days 1 through 14.  Plan was to also obtain an MRI of the lumbar spine to rule out lytic lesion at L3.  She was initiated on Zovirax 400 mg p.o. twice daily.  06/25/2019 C1 of chemotherapy initiated  07/01/2019 Note in chart regarding patient's large co-pay for the Revlimid and no plan at this point to initiate Revlimid and treat only with Velcade and Decadron per Dr. Walker  07/11/2019 MRI lumbar spine shows a pathologic superior endplate compression fracture at L3 without evidence of retropulsed fragment and innumerable enhancing lesions throughout the lumbar spine consistent with history of multiple myeloma.  09/10/2019 Increasing M-spike and kappa lambda ratio  10/01/2019 Initiation of CyBorD  11/17/2020 Plateau of M-spike at 1.2 after 36 cycles of CyBorD  12/01/2020 Initiation of Darzalex Faspro injection  03/23/2021 M-spike increased form 1.0 to 1.2 and velcade and dexamethasone added to plan  04/12/2022 Treatment HOLIDAY commenced with Mspike 0.3 after 22 cycles of treatent  05/27/2022 Received Evusheld while on treatment holiday.   07/18/2023 Mspike 0.8. Other labs stable. PET scan ordered.   08/02/2023 PET completed showing negative study. No evidence of mass or lymphadenopathy. No concerning hypermetabolism is present.  08/14/2023 Met with Dr. Walker. Given biochemical progression, he did feel appropriate to resume therapy again with Darzalex faspro  03/12/2023 Following C7, mspike magy from 0.2-->0.8. PET ordered which was negative. Again increased frequency of Darzalex with anticipation of adding in Pomalyst with C2.    04/29/2024 Addition of Pomalyst with C2    Current Chemo Regime/TX: Darzalex faspro injection 1800mg subcutaneous per protocol  "with weekly Dex and Pomalyst 1 mg days 1-21 of 28 day cycle  Current Cycle: C3   Completed cycles: 2  **Pom added C2 (4/29)       Previous treatment:   Velcade 1.3 mg/m2 days 1, 4, 8 and 11 with Decadron 40 mg days 1, 8 and 15 x 4 cycles;   Velcade 1.5mg.m2/cyclophosphamide 150mg every 7 days on days 1,8,15 and 22/decadron 40mg days 1,8,15,22 ; Darzalex faspro injection 1800mg subcutaneous per protocol    Past Medical History:   Diagnosis Date    Arthritis     Cyst of breast     Depressive disorder     Diabetes mellitus, type 2 (H) 01/18/2021    Essential hypertension 10/01/2015    Major depressive disorder, recurrent episode, mild (H24) 10/01/2015    Mixed hyperlipidemia 10/01/2015    Multiple myeloma not having achieved remission (H) 06/24/2019    Other specified disorders of bladder 07/09/2012    Irritable Bladder    Seasonal allergies 10/01/2015    Unspecified essential hypertension 03/19/2007    Unspecified sinusitis (chronic) 09/05/2007       Past Surgical History:   Procedure Laterality Date    APPENDECTOMY      BONE MARROW BIOPSY, BONE SPECIMEN, NEEDLE/TROCAR N/A 06/18/2019    Procedure: BONE MARROW BIOPSY;  Surgeon: Maciej Sanz MD;  Location: [facility] OR    CHOLECYSTECTOMY      COLONOSCOPY  07/18/2006    repeat 10 years    COLONOSCOPY N/A 12/30/2016    Procedure: COLONOSCOPY;  Surgeon: Bhaskar Franklin DO;  Location: [facility] OR    PUNC/ASPIR BREAST CYST Left 1995    benign    SINUS SURGERY      TUBAL STERILIZATION         Family History   Problem Relation Age of Onset    Breast Cancer Mother 60        Cause of Death; 1 breast    Parkinsonism Father         \"Possible\"    Coronary Artery Disease Father     Pacemaker Father     Thyroid Disease Daughter     Breast Cancer Maternal Aunt         over 50    Diabetes No family hx of     Hypertension No family hx of     Hyperlipidemia No family hx of     Cerebrovascular Disease No family hx of     Colon Cancer No family hx of     Prostate Cancer No family hx of     " Genetic Disorder No family hx of     Asthma No family hx of     Anesthesia Reaction No family hx of        Social History     Socioeconomic History    Marital status:      Spouse name: Not on file    Number of children: Not on file    Years of education: Not on file    Highest education level: Not on file   Occupational History    Occupation: Financial     Comment:  - (FT)   Tobacco Use    Smoking status: Never    Smokeless tobacco: Never    Tobacco comments:     Tried to Quit (YES); QUIT in 1971; Passive Exposure (NO)   Vaping Use    Vaping status: Never Used   Substance and Sexual Activity    Alcohol use: Yes     Comment: RARELY    Drug use: No    Sexual activity: Yes     Partners: Male     Birth control/protection: None   Other Topics Concern     Service Not Asked    Blood Transfusions Not Asked    Caffeine Concern Yes     Comment: Coffee >6 cups daily    Occupational Exposure Not Asked    Hobby Hazards Not Asked    Sleep Concern Not Asked    Stress Concern Not Asked    Weight Concern Not Asked    Special Diet Not Asked    Back Care Not Asked    Exercise Not Asked    Bike Helmet Not Asked    Seat Belt Not Asked    Self-Exams Not Asked    Parent/sibling w/ CABG, MI or angioplasty before 65F 55M? No   Social History Narrative    Not on file     Social Determinants of Health     Financial Resource Strain: Low Risk  (11/27/2023)    Financial Resource Strain     Within the past 12 months, have you or your family members you live with been unable to get utilities (heat, electricity) when it was really needed?: No   Food Insecurity: Low Risk  (11/27/2023)    Food Insecurity     Within the past 12 months, did you worry that your food would run out before you got money to buy more?: No     Within the past 12 months, did the food you bought just not last and you didn t have money to get more?: No   Transportation Needs: Low Risk  (11/27/2023)    Transportation Needs     Within the past 12  months, has lack of transportation kept you from medical appointments, getting your medicines, non-medical meetings or appointments, work, or from getting things that you need?: No   Physical Activity: Not on file   Stress: Not on file   Social Connections: Not on file   Interpersonal Safety: Low Risk  (11/27/2023)    Interpersonal Safety     Do you feel physically and emotionally safe where you currently live?: Yes     Within the past 12 months, have you been hit, slapped, kicked or otherwise physically hurt by someone?: No     Within the past 12 months, have you been humiliated or emotionally abused in other ways by your partner or ex-partner?: No   Housing Stability: Low Risk  (11/27/2023)    Housing Stability     Do you have housing? : Yes     Are you worried about losing your housing?: No       Current Outpatient Medications   Medication Sig Dispense Refill    acyclovir (ZOVIRAX) 400 MG tablet Take 1 tablet (400 mg) by mouth 2 times daily Start 1 week prior to daratumumab and continue for 3 months after daratumumab treatment complete. 60 tablet 11    aspirin (ASA) 81 MG chewable tablet Take 81 mg by mouth daily      atorvastatin (LIPITOR) 20 MG tablet Take 1 tablet (20 mg) by mouth daily 90 tablet 3    dexAMETHasone (DECADRON) 4 MG tablet Take 20 mg (5 tablets) weekly while on treatment. Take Day 1, Day 8, Day 15, Day 22 of treatment. 60 tablet 1    diphenhydrAMINE (BENADRYL) 25 MG capsule Take 25 mg by mouth daily      fluticasone (FLONASE) 50 MCG/ACT nasal spray SPRAY 2 SPRAYS INTO BOTH NOSTRILS DAILY 48 mL 0    L-Lysine HCl 500 MG CAPS Take by mouth daily      losartan (COZAAR) 50 MG tablet TAKE 1 TABLET BY MOUTH DAILY 90 tablet 3    pomalidomide (POMALYST) 1 MG capsule Take 1 capsule (1 mg) by mouth daily for 21 days Day 1 thru 21 of each 28 day cycle. Swallow whole with water. 21 capsule 0    sertraline (ZOLOFT) 50 MG tablet TAKE 1/2 TABLET BY MOUTH DAILY 30 tablet 4    prochlorperazine (COMPAZINE) 10 MG  "tablet Take 1 tablet (10 mg) by mouth every 6 hours as needed for nausea or vomiting (Patient not taking: Reported on 5/28/2024) 30 tablet 2     No current facility-administered medications for this visit.     Facility-Administered Medications Ordered in Other Visits   Medication Dose Route Frequency Provider Last Rate Last Admin    daratumumab-hyaluronidase-fihj (DARZALEX FASPRO) SUBCUTANEOUS injection 1,800 mg  1,800 mg Subcutaneous Once Elías Walker MD            Allergies   Allergen Reactions    Lisinopril Cough    Phenylephrine Hcl Other (See Comments)     **Entex  HEADACHE (SEVERE)    Phenylpropanolamine Other (See Comments)     **Entex  HEADACHE (SEVERE)    Pseudoephedrine Tannate Other (See Comments)     **Entex  HEADACHE (SEVERE)    Levofloxacin Rash     **Levaquin    Moxifloxacin Hcl [Moxifloxacin Hcl In Nacl] Rash       Review Of Systems:  A complete review of systems is negative except for the above mentioned items in the interval history.     Physical Exam:  /70 (BP Location: Right arm, Patient Position: Sitting, Cuff Size: Adult Regular)   Pulse 84   Temp 98.5  F (36.9  C) (Tympanic)   Ht 1.6 m (5' 3\")   Wt 77.7 kg (171 lb 4.8 oz)   SpO2 96%   BMI 30.34 kg/m    GENERAL APPEARANCE: Alert and in no acute distress.  HEENT: Eyes appear normal without scleral icterus. Extraocular movements intact.   NECK:   Supple with normal range of motion. No asymmetry or masses.  LYMPHATICS: No palpable cervical, supraclavicular nodes.  RESP: Lungs clear to auscultation bilaterally, respirations regular and easy.  CARDIOVASCULAR: Regular rate and rhythm. Normal S1, S2; no murmur, gallop, or rub.  ABDOMEN: Soft, non-tender, non-distended. No palpable organomegaly or masses.  MUSCULOSKELETAL: Extremities without gross deformities noted. No edema of bilateral lower extremities.  SKIN: No suspicious lesions or rashes.  NEURO: Alert and oriented x 3.  Gait steady.  PSYCHIATRIC: Mentation and affect appear " normal.  Mood appropriate.    Laboratory:  Results for orders placed or performed in visit on 05/28/24   Comprehensive metabolic panel     Status: Abnormal   Result Value Ref Range    Sodium 138 135 - 145 mmol/L    Potassium 4.1 3.4 - 5.3 mmol/L    Carbon Dioxide (CO2) 24 22 - 29 mmol/L    Anion Gap 11 7 - 15 mmol/L    Urea Nitrogen 18.8 8.0 - 23.0 mg/dL    Creatinine 0.81 0.51 - 0.95 mg/dL    GFR Estimate 75 >60 mL/min/1.73m2    Calcium 9.7 8.8 - 10.2 mg/dL    Chloride 103 98 - 107 mmol/L    Glucose 123 (H) 70 - 99 mg/dL    Alkaline Phosphatase 134 40 - 150 U/L    AST 16 0 - 45 U/L    ALT 15 0 - 50 U/L    Protein Total 6.7 6.4 - 8.3 g/dL    Albumin 4.1 3.5 - 5.2 g/dL    Bilirubin Total 0.4 <=1.2 mg/dL   CBC with platelets and differential     Status: None   Result Value Ref Range    WBC Count 6.4 4.0 - 11.0 10e3/uL    RBC Count 4.36 3.80 - 5.20 10e6/uL    Hemoglobin 13.4 11.7 - 15.7 g/dL    Hematocrit 38.9 35.0 - 47.0 %    MCV 89 78 - 100 fL    MCH 30.7 26.5 - 33.0 pg    MCHC 34.4 31.5 - 36.5 g/dL    RDW 14.2 10.0 - 15.0 %    Platelet Count 254 150 - 450 10e3/uL    % Neutrophils 41 %    % Lymphocytes 39 %    % Monocytes 13 %    % Eosinophils 4 %    % Basophils 2 %    % Immature Granulocytes 1 %    NRBCs per 100 WBC 0 <1 /100    Absolute Neutrophils 2.6 1.6 - 8.3 10e3/uL    Absolute Lymphocytes 2.5 0.8 - 5.3 10e3/uL    Absolute Monocytes 0.8 0.0 - 1.3 10e3/uL    Absolute Eosinophils 0.3 0.0 - 0.7 10e3/uL    Absolute Basophils 0.1 0.0 - 0.2 10e3/uL    Absolute Immature Granulocytes 0.1 <=0.4 10e3/uL    Absolute NRBCs 0.0 10e3/uL   CBC with Platelets & Differential     Status: None    Narrative    The following orders were created for panel order CBC with Platelets & Differential.  Procedure                               Abnormality         Status                     ---------                               -----------         ------                     CBC with platelets and d...[[id number]]                      Final  result                 Please view results for these tests on the individual orders.     Component      Latest Ref Rng 5/20/2024  11:09 AM   WBC      4.0 - 11.0 10e3/uL 4.3    RBC Count      3.80 - 5.20 10e6/uL 4.28    Hemoglobin      11.7 - 15.7 g/dL 13.2    Hematocrit      35.0 - 47.0 % 38.8    MCV      78 - 100 fL 91    MCH      26.5 - 33.0 pg 30.8    MCHC      31.5 - 36.5 g/dL 34.0    RDW      10.0 - 15.0 % 14.2    Platelet Count      150 - 450 10e3/uL 173    % Neutrophils      % 39    % Lymphocytes      % 37    % Monocytes      % 14    % Eosinophils      % 8    % Basophils      % 1    % Immature Granulocytes      % 1    NRBCs per 100 WBC      <1 /100 0    Absolute Neutrophils      1.6 - 8.3 10e3/uL 1.7    Absolute Lymphocytes      0.8 - 5.3 10e3/uL 1.6    Absolute Monocytes      0.0 - 1.3 10e3/uL 0.6    Absolute Eosinophils      0.0 - 0.7 10e3/uL 0.3    Absolute Basophils      0.0 - 0.2 10e3/uL 0.1    Absolute Immature Granulocytes      <=0.4 10e3/uL 0.0    Absolute NRBCs      10e3/uL 0.0    Albumin Fraction      3.7 - 5.1 g/dL 3.7    Alpha 1 Fraction      0.2 - 0.4 g/dL 0.3    Alpha 2 Fraction      0.5 - 0.9 g/dL 0.8    Beta Fraction      0.6 - 1.0 g/dL 0.7    Gamma Fraction      0.7 - 1.6 g/dL 0.8    Monoclonal Peak      <=0.0 g/dL 0.6 (H)    ELP Interpretation: Monoclonal protein (about 0.6 g/dL) seen in the gamma fraction. See immunofixation report on same specimen. Pathologic significance requires clinical correlation. BIA Izaguirre M.D., Ph.D., Pathologist ().    Signout Location if Remote [facility]    Signout Location if Remote [facility]    Kappa Free Lt Chain      0.33 - 1.94 mg/dL 0.79    Lambda Free Lt Chain      0.57 - 2.63 mg/dL 0.61    Kappa Lambda Ratio      0.26 - 1.65  1.30    IGG      610 - 1,616 mg/dL 854    IGA      84 - 499 mg/dL 19 (L)    IGM      35 - 242 mg/dL 32 (L)    Immunofixation ELP Monoclonal IgG immunoglobulin of kappa light chain type. Monoclonal antibody therapeutics (e.g.  Daratumumab) may appear as monoclonal proteins on serum electrophoresis and immunofixation, but usually as only very small IgG kappa monoclonals. Nevertheless, results should be interpreted with caution if the patient is receiving monoclonal antibody therapy. Pathologic significance requires clinical correlation.  BIA Izaguirre M.D., Ph.D., Pathologist ()    Viscosity Index      1.4 - 1.8  1.5    Beta-2-Microglobulin      <2.3 mg/L 2.2    Total Protein Serum for ELP      6.4 - 8.3 g/dL 6.3 (L)       Legend:  (H) High  (L) Low  Imaging Studies:  None this visit.     ASSESSMENT/PLAN:  #1 Multiple myeloma IgG kappa multiple myeloma with 80% involvement of the bone marrow diagnosed June 2019 initially treated with Velcade and Decadron and received 4 cycles with increasing M-spike and kappa/lambda ratio.  Treatment changed to CyBorD on 10/1/2019.  M-spike plateau at 1.2 and treatment changed to monotherapy with Darzalex Faspro injection. M spike declined to 1.0 then increased again to 1.2 so Velcade and Dex added to her treatment plan with cycle 5 therapy.     Following C22 (April 2022), she did initiate a treatment holiday when her Mspike was 0.3. We had followed her counts for over a year. Unfortunately, most recently, she did see a rise in her mspike to 0.8. PET completed was negative. Dr. Walker felt this was a biochemical progression and after discussion with patient, elected to resume treatment with Darzalex faspro. Following 8 cycles, mspike began to rise. PET was negative but feeling like she wasn't responding to Darzalex alone, decided to add in Pomalyst 1 mg p.o. daily on days 1 through 21 to begin with cycle 2 she will also receive dexamethasone 20 mg p.o. on days 1 ,8,15,22.    Today, she returns for C3 Darzalex and Pomalyst. Was due to start Pom yesterday but held until visit/labs today. Continues to tolerate treatment itself well.  Mpike has gone down to 0.6 from 0.7 last month. Will plan on  proceeding with treatment today, with weekly Darzalex, weekly Dex and Pomalyst Days 1-21 starting today. She will continue daily Acyclovir. I will see her back in 4 weeks, sooner with concerns. Reviewed calendar with her.      #2 Lytic lesions Hx lytic lesion at L3. Due for q3m Zometa in July. She remains on calcium with vitamin D twice a day. Weight bearing activity.        Patient in agreement with plan and verbalizes understanding. Agrees to call with any questions or concerns.      31 minutes spent in the patient's encounter today with time spent in review of patient's chart along with chart preparation and review of the treatment plan and signing of treatment plan.  Time was also spent with the patient in obtaining a review of systems and performing a physical exam along with detailed review of all test results. Time was also spent in discussing plan for future follow-up and relating instructions for follow-up and in placing future orders.      Nida Gonzalez, FLO, FNP-C

## 2024-06-11 ENCOUNTER — INFUSION THERAPY VISIT (OUTPATIENT)
Dept: INFUSION THERAPY | Facility: OTHER | Age: 74
End: 2024-06-11
Attending: INTERNAL MEDICINE
Payer: MEDICARE

## 2024-06-11 VITALS
OXYGEN SATURATION: 98 % | DIASTOLIC BLOOD PRESSURE: 64 MMHG | HEART RATE: 75 BPM | WEIGHT: 176.59 LBS | BODY MASS INDEX: 31.29 KG/M2 | TEMPERATURE: 96.9 F | HEIGHT: 63 IN | SYSTOLIC BLOOD PRESSURE: 115 MMHG

## 2024-06-11 DIAGNOSIS — C90.00 MULTIPLE MYELOMA NOT HAVING ACHIEVED REMISSION (H): Primary | ICD-10-CM

## 2024-06-11 LAB
BASOPHILS # BLD AUTO: 0 10E3/UL (ref 0–0.2)
BASOPHILS NFR BLD AUTO: 0 %
EOSINOPHIL # BLD AUTO: 0 10E3/UL (ref 0–0.7)
EOSINOPHIL NFR BLD AUTO: 0 %
ERYTHROCYTE [DISTWIDTH] IN BLOOD BY AUTOMATED COUNT: 14.5 % (ref 10–15)
HCT VFR BLD AUTO: 37.6 % (ref 35–47)
HGB BLD-MCNC: 12.8 G/DL (ref 11.7–15.7)
IMM GRANULOCYTES # BLD: 0.2 10E3/UL
IMM GRANULOCYTES NFR BLD: 2 %
LYMPHOCYTES # BLD AUTO: 1.7 10E3/UL (ref 0.8–5.3)
LYMPHOCYTES NFR BLD AUTO: 17 %
MCH RBC QN AUTO: 31 PG (ref 26.5–33)
MCHC RBC AUTO-ENTMCNC: 34 G/DL (ref 31.5–36.5)
MCV RBC AUTO: 91 FL (ref 78–100)
MONOCYTES # BLD AUTO: 1.1 10E3/UL (ref 0–1.3)
MONOCYTES NFR BLD AUTO: 11 %
NEUTROPHILS # BLD AUTO: 7.2 10E3/UL (ref 1.6–8.3)
NEUTROPHILS NFR BLD AUTO: 70 %
NRBC # BLD AUTO: 0 10E3/UL
NRBC BLD AUTO-RTO: 0 /100
PLATELET # BLD AUTO: 225 10E3/UL (ref 150–450)
RBC # BLD AUTO: 4.13 10E6/UL (ref 3.8–5.2)
WBC # BLD AUTO: 10.3 10E3/UL (ref 4–11)

## 2024-06-11 PROCEDURE — 84155 ASSAY OF PROTEIN SERUM: CPT | Mod: ZL

## 2024-06-11 PROCEDURE — 96401 CHEMO ANTI-NEOPL SQ/IM: CPT

## 2024-06-11 PROCEDURE — 84165 PROTEIN E-PHORESIS SERUM: CPT | Mod: ZL | Performed by: PATHOLOGY

## 2024-06-11 PROCEDURE — 83521 IG LIGHT CHAINS FREE EACH: CPT | Mod: ZL

## 2024-06-11 PROCEDURE — 84165 PROTEIN E-PHORESIS SERUM: CPT | Mod: 26 | Performed by: PATHOLOGY

## 2024-06-11 PROCEDURE — 82232 ASSAY OF BETA-2 PROTEIN: CPT | Mod: ZL

## 2024-06-11 PROCEDURE — 250N000011 HC RX IP 250 OP 636: Mod: JZ | Performed by: INTERNAL MEDICINE

## 2024-06-11 PROCEDURE — 36415 COLL VENOUS BLD VENIPUNCTURE: CPT

## 2024-06-11 PROCEDURE — 85025 COMPLETE CBC W/AUTO DIFF WBC: CPT | Mod: ZL | Performed by: INTERNAL MEDICINE

## 2024-06-11 PROCEDURE — 82784 ASSAY IGA/IGD/IGG/IGM EACH: CPT | Mod: ZL

## 2024-06-11 PROCEDURE — 86334 IMMUNOFIX E-PHORESIS SERUM: CPT | Mod: 26 | Performed by: PATHOLOGY

## 2024-06-11 PROCEDURE — 86334 IMMUNOFIX E-PHORESIS SERUM: CPT | Mod: ZL | Performed by: PATHOLOGY

## 2024-06-11 PROCEDURE — 85810 BLOOD VISCOSITY EXAMINATION: CPT | Mod: ZL

## 2024-06-11 RX ORDER — DEXAMETHASONE 4 MG/1
20 TABLET ORAL ONCE
Status: COMPLETED | OUTPATIENT
Start: 2024-06-11 | End: 2024-06-11

## 2024-06-11 RX ORDER — MONTELUKAST SODIUM 10 MG/1
10 TABLET ORAL ONCE
Status: COMPLETED | OUTPATIENT
Start: 2024-06-11 | End: 2024-06-11

## 2024-06-11 RX ORDER — ACETAMINOPHEN 325 MG/1
650 TABLET ORAL ONCE
Status: COMPLETED | OUTPATIENT
Start: 2024-06-11 | End: 2024-06-11

## 2024-06-11 RX ORDER — DIPHENHYDRAMINE HCL 50 MG
50 CAPSULE ORAL ONCE
Status: COMPLETED | OUTPATIENT
Start: 2024-06-11 | End: 2024-06-11

## 2024-06-11 RX ADMIN — MONTELUKAST SODIUM 10 MG: 10 TABLET ORAL at 13:32

## 2024-06-11 RX ADMIN — ACETAMINOPHEN 650 MG: 325 TABLET ORAL at 13:32

## 2024-06-11 RX ADMIN — DARATUMUMAB AND HYALURONIDASE-FIHJ (HUMAN RECOMBINANT) 1800 MG: 1800; 30000 INJECTION SUBCUTANEOUS at 14:10

## 2024-06-11 RX ADMIN — DEXAMETHASONE 20 MG: 4 TABLET ORAL at 13:32

## 2024-06-11 RX ADMIN — Medication 50 MG: at 13:32

## 2024-06-11 ASSESSMENT — PAIN SCALES - GENERAL: PAINLEVEL: NO PAIN (0)

## 2024-06-11 NOTE — PROGRESS NOTES
Infusion Nursing Note:  Carmelita Watts presents today for Faspro.    Patient seen by provider today: No   present during visit today: Not Applicable.    Note: N/A.    Intravenous Access:  Labs drawn without difficulty by Michelle PAGE    Treatment Conditions:  Results reviewed, labs MET treatment parameters, ok to proceed with treatment.      Component      Latest Ref Rng 6/11/2024  1:02 PM   WBC      4.0 - 11.0 10e3/uL 10.3    RBC Count      3.80 - 5.20 10e6/uL 4.13    Hemoglobin      11.7 - 15.7 g/dL 12.8    Hematocrit      35.0 - 47.0 % 37.6    MCV      78 - 100 fL 91    MCH      26.5 - 33.0 pg 31.0    MCHC      31.5 - 36.5 g/dL 34.0    RDW      10.0 - 15.0 % 14.5    Platelet Count      150 - 450 10e3/uL 225    % Neutrophils      % 70    % Lymphocytes      % 17    % Monocytes      % 11    % Eosinophils      % 0    % Basophils      % 0    % Immature Granulocytes      % 2    NRBCs per 100 WBC      <1 /100 0    Absolute Neutrophils      1.6 - 8.3 10e3/uL 7.2    Absolute Lymphocytes      0.8 - 5.3 10e3/uL 1.7    Absolute Monocytes      0.0 - 1.3 10e3/uL 1.1    Absolute Eosinophils      0.0 - 0.7 10e3/uL 0.0    Absolute Basophils      0.0 - 0.2 10e3/uL 0.0    Absolute Immature Granulocytes      <=0.4 10e3/uL 0.2    Absolute NRBCs      10e3/uL 0.0        Post Infusion Assessment:  Patient tolerated injection without incident.     The following medication was given:     MEDICATION: Faspro  ROUTE: SQ  SITE: LUQ - Abd  DOSE: 1800 mg    Discharge Plan:   Copy of AVS reviewed with patient and/or family.  Patient will return as scheduled for next appointment.  Patient discharged in stable condition accompanied by: self.  Departure Mode: Ambulatory.

## 2024-06-13 LAB
ALBUMIN SERPL ELPH-MCNC: 3.7 G/DL (ref 3.7–5.1)
ALPHA1 GLOB SERPL ELPH-MCNC: 0.3 G/DL (ref 0.2–0.4)
ALPHA2 GLOB SERPL ELPH-MCNC: 0.7 G/DL (ref 0.5–0.9)
B-GLOBULIN SERPL ELPH-MCNC: 0.1 G/DL (ref 0.6–1)
B2 MICROGLOB TUMOR MARKER SER-MCNC: 1.9 MG/L
GAMMA GLOB SERPL ELPH-MCNC: 0.6 G/DL (ref 0.7–1.6)
IGA SERPL-MCNC: 21 MG/DL (ref 84–499)
IGG SERPL-MCNC: 698 MG/DL (ref 610–1616)
IGM SERPL-MCNC: 27 MG/DL (ref 35–242)
KAPPA LC FREE SER-MCNC: 0.44 MG/DL (ref 0.33–1.94)
KAPPA LC FREE/LAMBDA FREE SER NEPH: 1.13 {RATIO} (ref 0.26–1.65)
LAMBDA LC FREE SERPL-MCNC: 0.39 MG/DL (ref 0.57–2.63)
M PROTEIN SERPL ELPH-MCNC: 0.3 G/DL
PROT PATTERN SERPL ELPH-IMP: ABNORMAL
PROT PATTERN SERPL IFE-IMP: NORMAL
TOTAL PROTEIN SERUM FOR ELP: 6.1 G/DL (ref 6.4–8.3)
VISC SER: 1.6 CP (ref 1.4–1.8)

## 2024-06-17 ENCOUNTER — TELEPHONE (OUTPATIENT)
Dept: ONCOLOGY | Facility: CLINIC | Age: 74
End: 2024-06-17
Payer: COMMERCIAL

## 2024-06-17 DIAGNOSIS — C90.00 MULTIPLE MYELOMA NOT HAVING ACHIEVED REMISSION (H): Primary | ICD-10-CM

## 2024-06-17 RX ORDER — DEXAMETHASONE 4 MG/1
TABLET ORAL
Qty: 10 TABLET | Refills: 0 | Status: SHIPPED | OUTPATIENT
Start: 2024-06-25 | End: 2024-06-25

## 2024-06-17 NOTE — TELEPHONE ENCOUNTER
Oral Chemotherapy Monitoring Program     Medication: Pomalyst  Rx: 1mg PO daily on days 1 through 21 of 238 day cycle   Auth# [id number]  Routine survey questions reviewed.   Rx to be Escribed to SP

## 2024-06-25 ENCOUNTER — ONCOLOGY VISIT (OUTPATIENT)
Dept: ONCOLOGY | Facility: OTHER | Age: 74
End: 2024-06-25
Attending: NURSE PRACTITIONER
Payer: MEDICARE

## 2024-06-25 ENCOUNTER — LAB (OUTPATIENT)
Dept: LAB | Facility: OTHER | Age: 74
End: 2024-06-25
Attending: NURSE PRACTITIONER
Payer: MEDICARE

## 2024-06-25 ENCOUNTER — INFUSION THERAPY VISIT (OUTPATIENT)
Dept: INFUSION THERAPY | Facility: OTHER | Age: 74
End: 2024-06-25
Attending: INTERNAL MEDICINE
Payer: MEDICARE

## 2024-06-25 VITALS
TEMPERATURE: 98.4 F | OXYGEN SATURATION: 97 % | RESPIRATION RATE: 20 BRPM | BODY MASS INDEX: 30.66 KG/M2 | HEIGHT: 63 IN | SYSTOLIC BLOOD PRESSURE: 110 MMHG | HEART RATE: 84 BPM | DIASTOLIC BLOOD PRESSURE: 78 MMHG | WEIGHT: 173.06 LBS

## 2024-06-25 DIAGNOSIS — M89.9 BONE LESION: ICD-10-CM

## 2024-06-25 DIAGNOSIS — C90.00 MULTIPLE MYELOMA NOT HAVING ACHIEVED REMISSION (H): ICD-10-CM

## 2024-06-25 DIAGNOSIS — C90.00 MULTIPLE MYELOMA NOT HAVING ACHIEVED REMISSION (H): Primary | ICD-10-CM

## 2024-06-25 LAB
ALBUMIN SERPL BCG-MCNC: 3.8 G/DL (ref 3.5–5.2)
ALP SERPL-CCNC: 139 U/L (ref 40–150)
ALT SERPL W P-5'-P-CCNC: 14 U/L (ref 0–50)
ANION GAP SERPL CALCULATED.3IONS-SCNC: 14 MMOL/L (ref 7–15)
AST SERPL W P-5'-P-CCNC: 15 U/L (ref 0–45)
BASOPHILS # BLD AUTO: 0.1 10E3/UL (ref 0–0.2)
BASOPHILS NFR BLD AUTO: 1 %
BILIRUB SERPL-MCNC: 0.3 MG/DL
BUN SERPL-MCNC: 18 MG/DL (ref 8–23)
CALCIUM SERPL-MCNC: 9.5 MG/DL (ref 8.8–10.2)
CHLORIDE SERPL-SCNC: 99 MMOL/L (ref 98–107)
CREAT SERPL-MCNC: 0.8 MG/DL (ref 0.51–0.95)
DEPRECATED HCO3 PLAS-SCNC: 22 MMOL/L (ref 22–29)
EGFRCR SERPLBLD CKD-EPI 2021: 76 ML/MIN/1.73M2
EOSINOPHIL # BLD AUTO: 0.4 10E3/UL (ref 0–0.7)
EOSINOPHIL NFR BLD AUTO: 8 %
ERYTHROCYTE [DISTWIDTH] IN BLOOD BY AUTOMATED COUNT: 14.9 % (ref 10–15)
GLUCOSE SERPL-MCNC: 143 MG/DL (ref 70–99)
HCT VFR BLD AUTO: 38.1 % (ref 35–47)
HGB BLD-MCNC: 13 G/DL (ref 11.7–15.7)
IMM GRANULOCYTES # BLD: 0.1 10E3/UL
IMM GRANULOCYTES NFR BLD: 1 %
LYMPHOCYTES # BLD AUTO: 2.1 10E3/UL (ref 0.8–5.3)
LYMPHOCYTES NFR BLD AUTO: 38 %
MCH RBC QN AUTO: 30.8 PG (ref 26.5–33)
MCHC RBC AUTO-ENTMCNC: 34.1 G/DL (ref 31.5–36.5)
MCV RBC AUTO: 90 FL (ref 78–100)
MONOCYTES # BLD AUTO: 0.8 10E3/UL (ref 0–1.3)
MONOCYTES NFR BLD AUTO: 15 %
NEUTROPHILS # BLD AUTO: 2.1 10E3/UL (ref 1.6–8.3)
NEUTROPHILS NFR BLD AUTO: 37 %
NRBC # BLD AUTO: 0 10E3/UL
NRBC BLD AUTO-RTO: 0 /100
PLATELET # BLD AUTO: 253 10E3/UL (ref 150–450)
POTASSIUM SERPL-SCNC: 3.8 MMOL/L (ref 3.4–5.3)
PROT SERPL-MCNC: 6.6 G/DL (ref 6.4–8.3)
RBC # BLD AUTO: 4.22 10E6/UL (ref 3.8–5.2)
SODIUM SERPL-SCNC: 135 MMOL/L (ref 135–145)
WBC # BLD AUTO: 5.7 10E3/UL (ref 4–11)

## 2024-06-25 PROCEDURE — 36415 COLL VENOUS BLD VENIPUNCTURE: CPT | Mod: ZL

## 2024-06-25 PROCEDURE — G0463 HOSPITAL OUTPT CLINIC VISIT: HCPCS | Mod: 25

## 2024-06-25 PROCEDURE — 250N000011 HC RX IP 250 OP 636: Mod: JZ | Performed by: NURSE PRACTITIONER

## 2024-06-25 PROCEDURE — G0463 HOSPITAL OUTPT CLINIC VISIT: HCPCS

## 2024-06-25 PROCEDURE — G2211 COMPLEX E/M VISIT ADD ON: HCPCS | Performed by: NURSE PRACTITIONER

## 2024-06-25 PROCEDURE — 96401 CHEMO ANTI-NEOPL SQ/IM: CPT

## 2024-06-25 PROCEDURE — 99214 OFFICE O/P EST MOD 30 MIN: CPT | Performed by: NURSE PRACTITIONER

## 2024-06-25 PROCEDURE — 82247 BILIRUBIN TOTAL: CPT | Mod: ZL

## 2024-06-25 PROCEDURE — 85041 AUTOMATED RBC COUNT: CPT | Mod: ZL

## 2024-06-25 RX ORDER — ACETAMINOPHEN 325 MG/1
650 TABLET ORAL ONCE
Status: CANCELLED | OUTPATIENT
Start: 2024-06-25

## 2024-06-25 RX ORDER — DIPHENHYDRAMINE HCL 50 MG
50 CAPSULE ORAL ONCE
Status: COMPLETED | OUTPATIENT
Start: 2024-06-25 | End: 2024-06-25

## 2024-06-25 RX ORDER — EPINEPHRINE 1 MG/ML
0.3 INJECTION, SOLUTION, CONCENTRATE INTRAVENOUS EVERY 5 MIN PRN
Status: CANCELLED | OUTPATIENT
Start: 2024-07-09

## 2024-06-25 RX ORDER — ACETAMINOPHEN 325 MG/1
650 TABLET ORAL
Status: CANCELLED | OUTPATIENT
Start: 2024-07-09

## 2024-06-25 RX ORDER — LORAZEPAM 2 MG/ML
0.5 INJECTION INTRAMUSCULAR EVERY 4 HOURS PRN
Status: CANCELLED | OUTPATIENT
Start: 2024-07-09

## 2024-06-25 RX ORDER — MONTELUKAST SODIUM 10 MG/1
10 TABLET ORAL ONCE
Status: COMPLETED | OUTPATIENT
Start: 2024-06-25 | End: 2024-06-25

## 2024-06-25 RX ORDER — MONTELUKAST SODIUM 10 MG/1
10 TABLET ORAL ONCE
Status: CANCELLED | OUTPATIENT
Start: 2024-07-09

## 2024-06-25 RX ORDER — EPINEPHRINE 1 MG/ML
0.3 INJECTION, SOLUTION, CONCENTRATE INTRAVENOUS EVERY 5 MIN PRN
Status: CANCELLED | OUTPATIENT
Start: 2024-06-25

## 2024-06-25 RX ORDER — ALBUTEROL SULFATE 0.83 MG/ML
2.5 SOLUTION RESPIRATORY (INHALATION)
Status: CANCELLED | OUTPATIENT
Start: 2024-07-09

## 2024-06-25 RX ORDER — DEXAMETHASONE 4 MG/1
TABLET ORAL
Qty: 10 TABLET | Refills: 0 | Status: SHIPPED | OUTPATIENT
Start: 2024-06-25 | End: 2024-07-23

## 2024-06-25 RX ORDER — DIPHENHYDRAMINE HCL 50 MG
50 CAPSULE ORAL
Status: CANCELLED | OUTPATIENT
Start: 2024-06-25

## 2024-06-25 RX ORDER — DIPHENHYDRAMINE HYDROCHLORIDE 50 MG/ML
50 INJECTION INTRAMUSCULAR; INTRAVENOUS
Status: CANCELLED
Start: 2024-06-25

## 2024-06-25 RX ORDER — MEPERIDINE HYDROCHLORIDE 25 MG/ML
25 INJECTION INTRAMUSCULAR; INTRAVENOUS; SUBCUTANEOUS EVERY 30 MIN PRN
Status: CANCELLED | OUTPATIENT
Start: 2024-07-09

## 2024-06-25 RX ORDER — DEXAMETHASONE 4 MG/1
20 TABLET ORAL ONCE
Status: COMPLETED | OUTPATIENT
Start: 2024-06-25 | End: 2024-06-25

## 2024-06-25 RX ORDER — ACETAMINOPHEN 325 MG/1
650 TABLET ORAL
Status: CANCELLED | OUTPATIENT
Start: 2024-06-25

## 2024-06-25 RX ORDER — ACETAMINOPHEN 325 MG/1
650 TABLET ORAL ONCE
Status: CANCELLED | OUTPATIENT
Start: 2024-07-09

## 2024-06-25 RX ORDER — DIPHENHYDRAMINE HCL 50 MG
50 CAPSULE ORAL ONCE
Status: CANCELLED | OUTPATIENT
Start: 2024-07-09

## 2024-06-25 RX ORDER — ALBUTEROL SULFATE 90 UG/1
1-2 AEROSOL, METERED RESPIRATORY (INHALATION)
Status: CANCELLED
Start: 2024-07-09

## 2024-06-25 RX ORDER — DIPHENHYDRAMINE HCL 50 MG
50 CAPSULE ORAL ONCE
Status: CANCELLED | OUTPATIENT
Start: 2024-06-25

## 2024-06-25 RX ORDER — ACETAMINOPHEN 325 MG/1
650 TABLET ORAL ONCE
Status: COMPLETED | OUTPATIENT
Start: 2024-06-25 | End: 2024-06-25

## 2024-06-25 RX ORDER — ALBUTEROL SULFATE 0.83 MG/ML
2.5 SOLUTION RESPIRATORY (INHALATION)
Status: CANCELLED | OUTPATIENT
Start: 2024-06-25

## 2024-06-25 RX ORDER — DEXAMETHASONE 4 MG/1
20 TABLET ORAL ONCE
Status: CANCELLED | OUTPATIENT
Start: 2024-07-09

## 2024-06-25 RX ORDER — MEPERIDINE HYDROCHLORIDE 25 MG/ML
25 INJECTION INTRAMUSCULAR; INTRAVENOUS; SUBCUTANEOUS EVERY 30 MIN PRN
Status: CANCELLED | OUTPATIENT
Start: 2024-06-25

## 2024-06-25 RX ORDER — METHYLPREDNISOLONE SODIUM SUCCINATE 125 MG/2ML
125 INJECTION, POWDER, LYOPHILIZED, FOR SOLUTION INTRAMUSCULAR; INTRAVENOUS
Status: CANCELLED
Start: 2024-06-25

## 2024-06-25 RX ORDER — LORAZEPAM 2 MG/ML
0.5 INJECTION INTRAMUSCULAR EVERY 4 HOURS PRN
Status: CANCELLED | OUTPATIENT
Start: 2024-06-25

## 2024-06-25 RX ORDER — DIPHENHYDRAMINE HYDROCHLORIDE 50 MG/ML
50 INJECTION INTRAMUSCULAR; INTRAVENOUS
Status: CANCELLED
Start: 2024-07-09

## 2024-06-25 RX ORDER — DIPHENHYDRAMINE HCL 50 MG
50 CAPSULE ORAL
Status: CANCELLED | OUTPATIENT
Start: 2024-07-09

## 2024-06-25 RX ORDER — METHYLPREDNISOLONE SODIUM SUCCINATE 125 MG/2ML
125 INJECTION, POWDER, LYOPHILIZED, FOR SOLUTION INTRAMUSCULAR; INTRAVENOUS
Status: CANCELLED
Start: 2024-07-09

## 2024-06-25 RX ORDER — ALBUTEROL SULFATE 90 UG/1
1-2 AEROSOL, METERED RESPIRATORY (INHALATION)
Status: CANCELLED
Start: 2024-06-25

## 2024-06-25 RX ADMIN — DEXAMETHASONE 20 MG: 4 TABLET ORAL at 10:01

## 2024-06-25 RX ADMIN — MONTELUKAST SODIUM 10 MG: 10 TABLET ORAL at 10:01

## 2024-06-25 RX ADMIN — DARATUMUMAB AND HYALURONIDASE-FIHJ (HUMAN RECOMBINANT) 1800 MG: 1800; 30000 INJECTION SUBCUTANEOUS at 10:40

## 2024-06-25 RX ADMIN — ACETAMINOPHEN 650 MG: 325 TABLET ORAL at 10:01

## 2024-06-25 RX ADMIN — Medication 50 MG: at 10:02

## 2024-06-25 ASSESSMENT — PAIN SCALES - GENERAL: PAINLEVEL: NO PAIN (0)

## 2024-06-25 NOTE — PROGRESS NOTES
Oncology Follow-up Visit    Reason for Visit:  Carmelita is a 73 year old woman with a diagnosis of multiple myeloma, who I am visiting with today for routine follow-up and consideration of ongoing therapy.     Nursing Note and documentation reviewed: Yes    Interval History:   Doing well. No obvious concerns today. No recent infection. No bleeding concerns. Slight nausea for which she will take periodic compazine but really quite mild. No vomiting. Appetite is alright. No bowel concerns. No skin concerns. Energy decent. Trying to stay active and resting as needed.     Oncologic History  12/31/2018 Presented to the emergency room with vertigo and fatigue.  CT scan of the head was negative and subsequent stress test was negative.  05/03/2019 PCP ordered lab work and noted a total protein of 12.9.  SPEP at that time showed an M spike of 6.2 with a large monoclonal protein seen in the gamma fraction. Urine immunofixation showed a possible small protein band in the gamma fraction  05/31/2019 Evaluated by Dr. Walker with Medical Oncology with plan to rule out myeloma and obtain bone marrow aspiration biopsy as well as a metastatic bone survey along with additional labwork  06/18/2019 Underwent bone marrow aspiration and biopsy  06/24/2019 Seen again by Dr. Walker and CBC showed a hemoglobin of 9.3, M spike 7.3 with monoclonal IgG immunoglobulin of kappa light chain type; serum viscosity was 2.9; quantitative immunoglobulins showed an IgG of 8160, beta-2 microglobulin was 5.8, BUN was 21 with creatinine is 0.8 and total protein was 13.7.  Quantitative kappa/lambda free light chains showed an elevated ratio of 17.0 bone marrow aspiration biopsy showed plasma cell myeloma with approximately 80% plasma cells.  Immunofixation showed IgG kappa and flow cytometry revealed kappa monotypic plasma cells consistent with clonal plasma cell neoplasm and FISH panel was pending at that time. It was felt she had at least stage II  disease based on her beta-2 microglobulin and anemia. Plan was to treat with Velcade 1.3 mg per/m2 days 1, 4, 8 and 11/Decadron 40 mg on days 1, 8 and 15. Initiation of Revlimid with C2 at 25 mg daily days 1 through 14.  Plan was to also obtain an MRI of the lumbar spine to rule out lytic lesion at L3.  She was initiated on Zovirax 400 mg p.o. twice daily.  06/25/2019 C1 of chemotherapy initiated  07/01/2019 Note in chart regarding patient's large co-pay for the Revlimid and no plan at this point to initiate Revlimid and treat only with Velcade and Decadron per Dr. Walker  07/11/2019 MRI lumbar spine shows a pathologic superior endplate compression fracture at L3 without evidence of retropulsed fragment and innumerable enhancing lesions throughout the lumbar spine consistent with history of multiple myeloma.  09/10/2019 Increasing M-spike and kappa lambda ratio  10/01/2019 Initiation of CyBorD  11/17/2020 Plateau of M-spike at 1.2 after 36 cycles of CyBorD  12/01/2020 Initiation of Darzalex Faspro injection  03/23/2021 M-spike increased form 1.0 to 1.2 and velcade and dexamethasone added to plan  04/12/2022 Treatment HOLIDAY commenced with Mspike 0.3 after 22 cycles of treatent  05/27/2022 Received Evusheld while on treatment holiday.   07/18/2023 Mspike 0.8. Other labs stable. PET scan ordered.   08/02/2023 PET completed showing negative study. No evidence of mass or lymphadenopathy. No concerning hypermetabolism is present.  08/14/2023 Met with Dr. Walker. Given biochemical progression, he did feel appropriate to resume therapy again with Darzalex faspro  03/12/2023 Following C7, mspike magy from 0.2-->0.8. PET ordered which was negative. Again increased frequency of Darzalex with anticipation of adding in Pomalyst with C2.    04/29/2024 Addition of Pomalyst with C2    Current Chemo Regime/TX: Darzalex faspro injection 1800mg subcutaneous per protocol with weekly Dex and Pomalyst 1 mg days 1-21 of 28 day  "cycle  Current Cycle: C4   Completed cycles: 3  **Pom added C2 (4/29)       Previous treatment:   Velcade 1.3 mg/m2 days 1, 4, 8 and 11 with Decadron 40 mg days 1, 8 and 15 x 4 cycles;   Velcade 1.5mg.m2/cyclophosphamide 150mg every 7 days on days 1,8,15 and 22/decadron 40mg days 1,8,15,22 ; Darzalex faspro injection 1800mg subcutaneous per protocol    Past Medical History:   Diagnosis Date    Arthritis     Cyst of breast     Depressive disorder     Diabetes mellitus, type 2 (H) 01/18/2021    Essential hypertension 10/01/2015    Major depressive disorder, recurrent episode, mild (H24) 10/01/2015    Mixed hyperlipidemia 10/01/2015    Multiple myeloma not having achieved remission (H) 06/24/2019    Other specified disorders of bladder 07/09/2012    Irritable Bladder    Seasonal allergies 10/01/2015    Unspecified essential hypertension 03/19/2007    Unspecified sinusitis (chronic) 09/05/2007       Past Surgical History:   Procedure Laterality Date    APPENDECTOMY      BONE MARROW BIOPSY, BONE SPECIMEN, NEEDLE/TROCAR N/A 06/18/2019    Procedure: BONE MARROW BIOPSY;  Surgeon: Maciej Sanz MD;  Location: [facility]    CHOLECYSTECTOMY      COLONOSCOPY  07/18/2006    repeat 10 years    COLONOSCOPY N/A 12/30/2016    Procedure: COLONOSCOPY;  Surgeon: Bhaskar Franklin DO;  Location: [facility]    PUNC/ASPIR BREAST CYST Left 1995    benign    SINUS SURGERY      TUBAL STERILIZATION         Family History   Problem Relation Age of Onset    Breast Cancer Mother 60        Cause of Death; 1 breast    Parkinsonism Father         \"Possible\"    Coronary Artery Disease Father     Pacemaker Father     Thyroid Disease Daughter     Breast Cancer Maternal Aunt         over 50    Diabetes No family hx of     Hypertension No family hx of     Hyperlipidemia No family hx of     Cerebrovascular Disease No family hx of     Colon Cancer No family hx of     Prostate Cancer No family hx of     Genetic Disorder No family hx of     Asthma No family hx " of     Anesthesia Reaction No family hx of        Social History     Socioeconomic History    Marital status:      Spouse name: Not on file    Number of children: Not on file    Years of education: Not on file    Highest education level: Not on file   Occupational History    Occupation: Financial     Comment:  - (FT)   Tobacco Use    Smoking status: Never    Smokeless tobacco: Never    Tobacco comments:     Tried to Quit (YES); QUIT in 1971; Passive Exposure (NO)   Vaping Use    Vaping status: Never Used   Substance and Sexual Activity    Alcohol use: Yes     Comment: RARELY    Drug use: No    Sexual activity: Yes     Partners: Male     Birth control/protection: None   Other Topics Concern     Service Not Asked    Blood Transfusions Not Asked    Caffeine Concern Yes     Comment: Coffee >6 cups daily    Occupational Exposure Not Asked    Hobby Hazards Not Asked    Sleep Concern Not Asked    Stress Concern Not Asked    Weight Concern Not Asked    Special Diet Not Asked    Back Care Not Asked    Exercise Not Asked    Bike Helmet Not Asked    Seat Belt Not Asked    Self-Exams Not Asked    Parent/sibling w/ CABG, MI or angioplasty before 65F 55M? No   Social History Narrative    Not on file     Social Determinants of Health     Financial Resource Strain: Low Risk  (11/27/2023)    Financial Resource Strain     Within the past 12 months, have you or your family members you live with been unable to get utilities (heat, electricity) when it was really needed?: No   Food Insecurity: Low Risk  (11/27/2023)    Food Insecurity     Within the past 12 months, did you worry that your food would run out before you got money to buy more?: No     Within the past 12 months, did the food you bought just not last and you didn t have money to get more?: No   Transportation Needs: Low Risk  (11/27/2023)    Transportation Needs     Within the past 12 months, has lack of transportation kept you from medical  appointments, getting your medicines, non-medical meetings or appointments, work, or from getting things that you need?: No   Physical Activity: Not on file   Stress: Not on file   Social Connections: Not on file   Interpersonal Safety: Low Risk  (11/27/2023)    Interpersonal Safety     Do you feel physically and emotionally safe where you currently live?: Yes     Within the past 12 months, have you been hit, slapped, kicked or otherwise physically hurt by someone?: No     Within the past 12 months, have you been humiliated or emotionally abused in other ways by your partner or ex-partner?: No   Housing Stability: Low Risk  (11/27/2023)    Housing Stability     Do you have housing? : Yes     Are you worried about losing your housing?: No       Current Outpatient Medications   Medication Sig Dispense Refill    acyclovir (ZOVIRAX) 400 MG tablet Take 1 tablet (400 mg) by mouth 2 times daily Start 1 week prior to daratumumab and continue for 3 months after daratumumab treatment complete. 60 tablet 11    aspirin (ASA) 81 MG chewable tablet Take 81 mg by mouth daily      atorvastatin (LIPITOR) 20 MG tablet Take 1 tablet (20 mg) by mouth daily 90 tablet 3    dexAMETHasone (DECADRON) 4 MG tablet Take 5 tablets (20 mg) by mouth weekly 10 tablet 0    diphenhydrAMINE (BENADRYL) 25 MG capsule Take 25 mg by mouth daily      fluticasone (FLONASE) 50 MCG/ACT nasal spray SPRAY 2 SPRAYS INTO BOTH NOSTRILS DAILY 48 mL 0    L-Lysine HCl 500 MG CAPS Take by mouth daily      losartan (COZAAR) 50 MG tablet TAKE 1 TABLET BY MOUTH DAILY 90 tablet 3    pomalidomide (POMALYST) 1 MG capsule Take 1 capsule (1 mg) by mouth daily for 21 days Day 1 thru 21 of each 28 day cycle. Swallow whole with water. 21 capsule 0    prochlorperazine (COMPAZINE) 10 MG tablet Take 1 tablet (10 mg) by mouth every 6 hours as needed for nausea or vomiting 30 tablet 2    sertraline (ZOLOFT) 50 MG tablet TAKE 1/2 TABLET BY MOUTH DAILY 30 tablet 4     No current  "facility-administered medications for this visit.        Allergies   Allergen Reactions    Lisinopril Cough    Phenylephrine Hcl Other (See Comments)     **Entex  HEADACHE (SEVERE)    Phenylpropanolamine Other (See Comments)     **Entex  HEADACHE (SEVERE)    Pseudoephedrine Tannate Other (See Comments)     **Entex  HEADACHE (SEVERE)    Levofloxacin Rash     **Levaquin    Moxifloxacin Hcl [Moxifloxacin Hcl In Nacl] Rash       Review Of Systems:  A complete review of systems is negative except for the above mentioned items in the interval history.     Physical Exam:  /78   Pulse 84   Temp 98.4  F (36.9  C) (Tympanic)   Resp 20   Ht 1.6 m (5' 3\")   Wt 78.5 kg (173 lb 1 oz)   SpO2 97%   BMI 30.66 kg/m    GENERAL APPEARANCE: Alert and in no acute distress.  HEENT: Eyes appear normal without scleral icterus. Extraocular movements intact.   NECK:   Supple with normal range of motion. No asymmetry or masses.  LYMPHATICS: No palpable cervical, supraclavicular nodes.  RESP: Lungs clear to auscultation bilaterally, respirations regular and easy.  CARDIOVASCULAR: Regular rate and rhythm. Normal S1, S2; no murmur, gallop, or rub.  ABDOMEN: Soft, non-tender, non-distended. No palpable organomegaly or masses.  MUSCULOSKELETAL: Extremities without gross deformities noted. No edema of bilateral lower extremities.  SKIN: No suspicious lesions or rashes.  NEURO: Alert and oriented x 3.  Gait steady.  PSYCHIATRIC: Mentation and affect appear normal.  Mood appropriate.    Laboratory:  Results for orders placed or performed in visit on 06/25/24   Comprehensive metabolic panel     Status: Abnormal   Result Value Ref Range    Sodium 135 135 - 145 mmol/L    Potassium 3.8 3.4 - 5.3 mmol/L    Carbon Dioxide (CO2) 22 22 - 29 mmol/L    Anion Gap 14 7 - 15 mmol/L    Urea Nitrogen 18.0 8.0 - 23.0 mg/dL    Creatinine 0.80 0.51 - 0.95 mg/dL    GFR Estimate 76 >60 mL/min/1.73m2    Calcium 9.5 8.8 - 10.2 mg/dL    Chloride 99 98 - 107 " mmol/L    Glucose 143 (H) 70 - 99 mg/dL    Alkaline Phosphatase 139 40 - 150 U/L    AST 15 0 - 45 U/L    ALT 14 0 - 50 U/L    Protein Total 6.6 6.4 - 8.3 g/dL    Albumin 3.8 3.5 - 5.2 g/dL    Bilirubin Total 0.3 <=1.2 mg/dL   CBC with platelets and differential     Status: None   Result Value Ref Range    WBC Count 5.7 4.0 - 11.0 10e3/uL    RBC Count 4.22 3.80 - 5.20 10e6/uL    Hemoglobin 13.0 11.7 - 15.7 g/dL    Hematocrit 38.1 35.0 - 47.0 %    MCV 90 78 - 100 fL    MCH 30.8 26.5 - 33.0 pg    MCHC 34.1 31.5 - 36.5 g/dL    RDW 14.9 10.0 - 15.0 %    Platelet Count 253 150 - 450 10e3/uL    % Neutrophils 37 %    % Lymphocytes 38 %    % Monocytes 15 %    % Eosinophils 8 %    % Basophils 1 %    % Immature Granulocytes 1 %    NRBCs per 100 WBC 0 <1 /100    Absolute Neutrophils 2.1 1.6 - 8.3 10e3/uL    Absolute Lymphocytes 2.1 0.8 - 5.3 10e3/uL    Absolute Monocytes 0.8 0.0 - 1.3 10e3/uL    Absolute Eosinophils 0.4 0.0 - 0.7 10e3/uL    Absolute Basophils 0.1 0.0 - 0.2 10e3/uL    Absolute Immature Granulocytes 0.1 <=0.4 10e3/uL    Absolute NRBCs 0.0 10e3/uL   CBC with Platelets & Differential     Status: None    Narrative    The following orders were created for panel order CBC with Platelets & Differential.  Procedure                               Abnormality         Status                     ---------                               -----------         ------                     CBC with platelets and d...[[id number]]                      Final result                 Please view results for these tests on the individual orders.       Component      Latest Ref Rng 6/11/2024  1:02 PM   WBC      4.0 - 11.0 10e3/uL 10.3    RBC Count      3.80 - 5.20 10e6/uL 4.13    Hemoglobin      11.7 - 15.7 g/dL 12.8    Hematocrit      35.0 - 47.0 % 37.6    MCV      78 - 100 fL 91    MCH      26.5 - 33.0 pg 31.0    MCHC      31.5 - 36.5 g/dL 34.0    RDW      10.0 - 15.0 % 14.5    Platelet Count      150 - 450 10e3/uL 225    % Neutrophils       % 70    % Lymphocytes      % 17    % Monocytes      % 11    % Eosinophils      % 0    % Basophils      % 0    % Immature Granulocytes      % 2    NRBCs per 100 WBC      <1 /100 0    Absolute Neutrophils      1.6 - 8.3 10e3/uL 7.2    Absolute Lymphocytes      0.8 - 5.3 10e3/uL 1.7    Absolute Monocytes      0.0 - 1.3 10e3/uL 1.1    Absolute Eosinophils      0.0 - 0.7 10e3/uL 0.0    Absolute Basophils      0.0 - 0.2 10e3/uL 0.0    Absolute Immature Granulocytes      <=0.4 10e3/uL 0.2    Absolute NRBCs      10e3/uL 0.0    Albumin Fraction      3.7 - 5.1 g/dL 3.7    Alpha 1 Fraction      0.2 - 0.4 g/dL 0.3    Alpha 2 Fraction      0.5 - 0.9 g/dL 0.7    Beta Fraction      0.6 - 1.0 g/dL 0.1 (L)    Gamma Fraction      0.7 - 1.6 g/dL 0.6 (L)    Monoclonal Peak      <=0.0 g/dL 0.3 (H)    ELP Interpretation: Small monoclonal protein (0.3 g/dL) seen in the gamma fraction. See immunofixation report on same specimen. Pathologic significance requires clinical correlation. KENDRA Akins M.D., Ph.D., Pathologist.    [place] Free Lt Chain      0.33 - 1.94 mg/dL 0.44    Lambda Free Lt Chain      0.57 - 2.63 mg/dL 0.39 (L)    Kappa Lambda Ratio      0.26 - 1.65  1.13    IGG      610 - 1,616 mg/dL 698    IGA      84 - 499 mg/dL 21 (L)    IGM      35 - 242 mg/dL 27 (L)    Immunofixation ELP Monoclonal IgG immunoglobulin of kappa light chain type with suppression of background immunoglobulins. Monoclonal antibody therapeutics (e.g. Daratumumab) may appear as monoclonal proteins on serum electrophoresis and immunofixation. Results should be interpreted with caution. Pathologic significance requires clinical correlation. KENDRA Akins M.D., Ph.D., Pathologist    Viscosity Index      1.4 - 1.8  1.6    Beta-2-Microglobulin      <2.3 mg/L 1.9    Total Protein Serum for ELP      6.4 - 8.3 g/dL 6.1 (L)       Legend:  (L) Low  (H) High    Imaging Studies:  None this visit.     ASSESSMENT/PLAN:  #1 Multiple myeloma IgG kappa multiple myeloma with  80% involvement of the bone marrow diagnosed June 2019 initially treated with Velcade and Decadron and received 4 cycles with increasing M-spike and kappa/lambda ratio.  Treatment changed to CyBorD on 10/1/2019.  M-spike plateau at 1.2 and treatment changed to monotherapy with Darzalex Faspro injection. M spike declined to 1.0 then increased again to 1.2 so Velcade and Dex added to her treatment plan with cycle 5 therapy.     Following C22 (April 2022), she did initiate a treatment holiday when her Mspike was 0.3. We had followed her counts for over a year. Unfortunately, most recently, she did see a rise in her mspike to 0.8. PET completed was negative. Dr. Walker felt this was a biochemical progression and after discussion with patient, elected to resume treatment with Darzalex faspro. Following 8 cycles, mspike began to rise. PET was negative but feeling like she wasn't responding to Darzalex alone, decided to add in Pomalyst 1 mg p.o. daily on days 1 through 21 to begin with cycle 2 she will also receive dexamethasone 20 mg p.o. on days 1 ,8,15,22.    Today, she returns for C4 Darzalex and Pomalyst. She continues to have a good response based on Mspike, today at 0.3 from 0.6. She is feeling decently, tolerating therapy with modest side effects. I see no contraindications to proceeding with therapy with D1 and 15 Darzalex, Pom Days 1-21, and Dex 20 mg weekly. We will check myeloma labs in 3 weeks and I will see her in 4, prior to next cycle.     I have reached out to social work asking to meet with patient prior to next cycle regarding finances and cost of Pomalyst and paperwork for recently acquired [facility] coverage.     #2 Lytic lesions Hx lytic lesion at L3. Due for q3m Zometa in July. She remains on calcium with vitamin D twice a day. Weight bearing activity.        Patient in agreement with plan and verbalizes understanding. Agrees to call with any questions or concerns.      36 minutes spent in the patient's  encounter today with time spent in review of patient's chart along with chart preparation and review of the treatment plan and signing of treatment plan.  Time was also spent with the patient in obtaining a review of systems and performing a physical exam along with detailed review of all test results. Time was also spent in discussing plan for future follow-up and relating instructions for follow-up and in placing future orders.      FLO Duque, FNP-C

## 2024-06-25 NOTE — PROGRESS NOTES
Infusion Nursing Note:  Carmelita Watts presents today for Faspro.    Patient seen by provider today: Yes: Nida Gonzalez.   present during visit today: Not Applicable.    Note: N/A.    Intravenous Access:  No Intravenous access/labs at this visit.    Treatment Conditions:  Results reviewed, labs MET treatment parameters, ok to proceed with treatment.      Post Infusion Assessment:  Patient tolerated injection without incident into right side of abdomen.      Discharge Plan:   Discharge instructions reviewed with: Patient.

## 2024-06-25 NOTE — NURSING NOTE
"Oncology Rooming Note    June 25, 2024 9:03 AM   Carmelita Watts is a 73 year old female who presents for:    Chief Complaint   Patient presents with    Oncology Clinic Visit     Follow up Multiple myeloma not having achieved remission        Initial Vitals: /78   Pulse 84   Temp 98.4  F (36.9  C) (Tympanic)   Resp 20   Ht 1.6 m (5' 3\")   Wt 78.5 kg (173 lb 1 oz)   SpO2 97%   BMI 30.66 kg/m   Estimated body mass index is 30.66 kg/m  as calculated from the following:    Height as of this encounter: 1.6 m (5' 3\").    Weight as of this encounter: 78.5 kg (173 lb 1 oz). Body surface area is 1.87 meters squared.  No Pain (0) Comment: Data Unavailable   No LMP recorded. Patient is postmenopausal.  Allergies reviewed: Yes  Medications reviewed: Yes    Medications: Medication refills not needed today.  Pharmacy name entered into [facility]: [id number] - [place], [place] - [address] [place] [facility] [place] [address]    Frailty Screening:   Is the patient here for a new oncology consult visit in cancer care? 2. No            Vilma Chi LPN             "

## 2024-07-02 ENCOUNTER — HOSPITAL ENCOUNTER (EMERGENCY)
Facility: HOSPITAL | Age: 74
Discharge: HOME | End: 2024-07-02
Attending: NURSE PRACTITIONER | Admitting: NURSE PRACTITIONER
Payer: MEDICARE

## 2024-07-02 VITALS
RESPIRATION RATE: 18 BRPM | WEIGHT: 180.56 LBS | HEART RATE: 67 BPM | TEMPERATURE: 97.2 F | OXYGEN SATURATION: 96 % | SYSTOLIC BLOOD PRESSURE: 125 MMHG | BODY MASS INDEX: 31.98 KG/M2 | DIASTOLIC BLOOD PRESSURE: 83 MMHG

## 2024-07-02 DIAGNOSIS — R21 RASH: Primary | ICD-10-CM

## 2024-07-02 PROCEDURE — G0463 HOSPITAL OUTPT CLINIC VISIT: HCPCS

## 2024-07-02 PROCEDURE — 99213 OFFICE O/P EST LOW 20 MIN: CPT | Performed by: NURSE PRACTITIONER

## 2024-07-02 RX ORDER — BENZOCAINE/MENTHOL 6 MG-10 MG
LOZENGE MUCOUS MEMBRANE 2 TIMES DAILY
Qty: 45 G | Refills: 0 | Status: SHIPPED | OUTPATIENT
Start: 2024-07-02

## 2024-07-02 RX ORDER — CETIRIZINE HYDROCHLORIDE 10 MG/1
10 TABLET ORAL DAILY
Qty: 10 TABLET | Refills: 0 | Status: SHIPPED | OUTPATIENT
Start: 2024-07-02 | End: 2024-07-10

## 2024-07-02 ASSESSMENT — ENCOUNTER SYMPTOMS
TROUBLE SWALLOWING: 0
FEVER: 0
WOUND: 0
CHILLS: 0
SHORTNESS OF BREATH: 0

## 2024-07-02 NOTE — ED TRIAGE NOTES
Pt presents with c/o having a concern about possible reaction to a new medication   Pt states does get small hives arund the arms and feels flushed in the face   Pt reports took benadryl this morning   Pt reports 3rd dose of new med

## 2024-07-02 NOTE — DISCHARGE INSTRUCTIONS
The rash could be related to the chemo medication but it also could be due to something else.  Continue taking Benadryl as per directions.  I also prescribed Zyrtec and a steroid cream for you to apply over the rash.    If the rash continues to worsen or you develop any other concerning symptoms please return to urgent care or emergency room for evaluation.    Follow-up with your doctor or chemo doctors as needed.

## 2024-07-02 NOTE — ED TRIAGE NOTES
MADELINE Valencia CNP assessed patient in triage and determined patient Urgent Care appropriate. Will be seen in Urgent Care.

## 2024-07-02 NOTE — ED PROVIDER NOTES
History     Chief Complaint   Patient presents with     Medication Reaction     HPI  Carmelita Watts is a 73 year old female who is currently being treated for multiple myeloma with Pomalyst infusions and presents today with concern of a reaction to this medication.  Patient tells me that over the last 2 weeks she has noticed some bumps to bilateral that mostly just resolve on their own.  Today she noticed a rash to her chest and neck and notes that her face feels a little flushed and she has a rash on her face.  No new face soap products.  No new medications, foods, soaps, detergents.  She is supposed to be getting day 10 out of 21 of the third round of Pomalyst tomorrow as she has been doing this and cycles.  She took Benadryl this morning.    No trouble swallowing.  No shortness of breath.  No chest pain/chest tightness.  No known fevers or chills.    Allergies:  Allergies   Allergen Reactions     Lisinopril Cough     Phenylephrine Hcl Other (See Comments)     **Entex  HEADACHE (SEVERE)     Phenylpropanolamine Other (See Comments)     **Entex  HEADACHE (SEVERE)     Pseudoephedrine Tannate Other (See Comments)     **Entex  HEADACHE (SEVERE)     Levofloxacin Rash     **Levaquin     Moxifloxacin Hcl [Moxifloxacin Hcl In Nacl] Rash       Problem List:    Patient Active Problem List    Diagnosis Date Noted     Arthritis 11/27/2023     Priority: Medium     SOB (shortness of breath) 09/20/2021     Priority: Medium     Diabetes mellitus, type 2 (H) 01/18/2021     Priority: Medium     Multiple myeloma not having achieved remission (H) 06/24/2019     Priority: Medium     ACP (advance care planning) 10/26/2016     Priority: Medium     Advance Care Planning 10/26/2016: ACP Review of Chart / Resources Provided:  Reviewed chart for advance care plan.  Carmelita Watts has no plan or code status on file. Discussed available resources and provided with information. Confirmed code status reflects current choices pending further  "ACP discussions.  Confirmed/documented legally designated decision makers.  Added by Alison Landeros             Major depressive disorder, recurrent episode, mild (H24) 10/01/2015     Priority: Medium     Seasonal allergies 10/01/2015     Priority: Medium     Mixed hyperlipidemia 10/01/2015     Priority: Medium     Hyperlipidemia LDL goal <130 07/05/2013     Priority: Medium     Hypertension goal BP (blood pressure) < 140/80 07/05/2013     Priority: Medium     Advanced care planning/counseling discussion 07/09/2012     Priority: Medium        Past Medical History:    Past Medical History:   Diagnosis Date     Arthritis      Cyst of breast      Depressive disorder      Diabetes mellitus, type 2 (H) 01/18/2021     Essential hypertension 10/01/2015     Major depressive disorder, recurrent episode, mild (H24) 10/01/2015     Mixed hyperlipidemia 10/01/2015     Multiple myeloma not having achieved remission (H) 06/24/2019     Other specified disorders of bladder 07/09/2012     Seasonal allergies 10/01/2015     Unspecified essential hypertension 03/19/2007     Unspecified sinusitis (chronic) 09/05/2007       Past Surgical History:    Past Surgical History:   Procedure Laterality Date     APPENDECTOMY       BONE MARROW BIOPSY, BONE SPECIMEN, NEEDLE/TROCAR N/A 06/18/2019    Procedure: BONE MARROW BIOPSY;  Surgeon: Maciej Sanz MD;  Location: [facility]     CHOLECYSTECTOMY       COLONOSCOPY  07/18/2006    repeat 10 years     COLONOSCOPY N/A 12/30/2016    Procedure: COLONOSCOPY;  Surgeon: Bhaskar Franklin DO;  Location: [facility]     PUNC/ASPIR BREAST CYST Left 1995    benign     SINUS SURGERY       TUBAL STERILIZATION         Family History:    Family History   Problem Relation Age of Onset     Breast Cancer Mother 60        Cause of Death; 1 breast     Parkinsonism Father         \"Possible\"     Coronary Artery Disease Father      Pacemaker Father      Thyroid Disease Daughter      Breast Cancer Maternal Aunt         over 50 "     Diabetes No family hx of      Hypertension No family hx of      Hyperlipidemia No family hx of      Cerebrovascular Disease No family hx of      Colon Cancer No family hx of      Prostate Cancer No family hx of      Genetic Disorder No family hx of      Asthma No family hx of      Anesthesia Reaction No family hx of        Social History:  Marital Status:   [5]  Social History     Tobacco Use     Smoking status: Never     Smokeless tobacco: Never     Tobacco comments:     Tried to Quit (YES); QUIT in 1971; Passive Exposure (NO)   Vaping Use     Vaping status: Never Used   Substance Use Topics     Alcohol use: Yes     Comment: RARELY     Drug use: No        Medications:    acyclovir (ZOVIRAX) 400 MG tablet  atorvastatin (LIPITOR) 20 MG tablet  cetirizine (ZYRTEC) 10 MG tablet  dexAMETHasone (DECADRON) 4 MG tablet  diphenhydrAMINE (BENADRYL) 25 MG capsule  fluticasone (FLONASE) 50 MCG/ACT nasal spray  hydrocortisone (CORTAID) 1 % external cream  losartan (COZAAR) 50 MG tablet  pomalidomide (POMALYST) 1 MG capsule  prochlorperazine (COMPAZINE) 10 MG tablet  sertraline (ZOLOFT) 50 MG tablet  aspirin (ASA) 81 MG chewable tablet  L-Lysine HCl 500 MG CAPS          Review of Systems   Constitutional:  Negative for chills and fever.   HENT:  Negative for trouble swallowing.    Respiratory:  Negative for shortness of breath.    Skin:  Positive for rash. Negative for wound.   All other systems reviewed and are negative.      Physical Exam   BP: (!) 154/102  Pulse: 67  Temp: 97.2  F (36.2  C)  Resp: 18  Weight: 81.9 kg (180 lb 8.9 oz)  SpO2: 96 %      Physical Exam  Vitals and nursing note reviewed.   Constitutional:       Appearance: Normal appearance. She is not ill-appearing or toxic-appearing.   HENT:      Head: Atraumatic.      Comments: Mild redness to bilateral cheeks, neck and upper chest area.     Nose: Nose normal.      Mouth/Throat:      Mouth: Mucous membranes are moist.   Eyes:      Extraocular  Movements: Extraocular movements intact.      Pupils: Pupils are equal, round, and reactive to light.   Cardiovascular:      Rate and Rhythm: Normal rate.   Pulmonary:      Effort: Pulmonary effort is normal. No respiratory distress.   Musculoskeletal:      Cervical back: Neck supple.   Skin:     General: Skin is warm and dry.      Capillary Refill: Capillary refill takes less than 2 seconds.      Coloration: Skin is not pale.      Findings: Rash present. No bruising.   Neurological:      Mental Status: She is alert and oriented to person, place, and time.         ED Course     ED Course as of 07/02/24 1428   Tue Jul 02, 2024   1148 Consult to Nida Gonzalez NP in oncology.  I discussed patient's concerns as well as physical exam findings with her.  She recommends a steroid cream.       Procedures       No results found. However, due to the size of the patient record, not all encounters were searched. Please check Results Review for a complete set of results.    Medications - No data to display    Assessments & Plan (with Medical Decision Making)   Well-appearing 73-year-old female that presented with concern of a rash that started 2 of face as well as her upper chest.  Notes has been having some sparse bumps to her body for the last 2 weeks and today's only day she noticed the rash.  Concerned this may be related to Pomalyst infusions that she is receiving for multiple myeloma treatment.  Did consult oncology and appreciate their recommendations.  Patient is not having any trouble breathing.  No significant swelling was appreciated to oral mucosa or her face.  Appears to have some rash to her chest as well as over her bilateral cheeks.  Will treat with hydrocortisone that way she can still apply that on her face as needed.  Also prescribed Zyrtec.  May continue taking Benadryl as needed.  Advised her to follow-up with your primary doctor or oncology as needed.  Keep oncology up-to-date if the rash does not  appear to be worsening with each infusion that she is getting.  Strict return precautions discussed with patient.  Patient voiced understanding and agreement plan of care.    I have reviewed the nursing notes.    I have reviewed the findings, diagnosis, plan and need for follow up with the patient.  This document was prepared using a combination of typing and voice generated software.  While every attempt was made for accuracy, spelling and grammatical errors may exist.         New Prescriptions    CETIRIZINE (ZYRTEC) 10 MG TABLET    Take 1 tablet (10 mg) by mouth daily for 10 days    HYDROCORTISONE (CORTAID) 1 % EXTERNAL CREAM    Apply topically 2 times daily sparingly over rash to face and chest       Final diagnoses:   Rash       7/2/2024   [facility]       Mpofu, Prudence, CNP  07/02/24 [id number]

## 2024-07-09 ENCOUNTER — INFUSION THERAPY VISIT (OUTPATIENT)
Dept: INFUSION THERAPY | Facility: OTHER | Age: 74
End: 2024-07-09
Attending: INTERNAL MEDICINE
Payer: MEDICARE

## 2024-07-09 VITALS
WEIGHT: 176.1 LBS | HEART RATE: 61 BPM | DIASTOLIC BLOOD PRESSURE: 66 MMHG | BODY MASS INDEX: 31.19 KG/M2 | SYSTOLIC BLOOD PRESSURE: 115 MMHG

## 2024-07-09 DIAGNOSIS — C90.00 MULTIPLE MYELOMA NOT HAVING ACHIEVED REMISSION (H): Primary | ICD-10-CM

## 2024-07-09 LAB
BASOPHILS # BLD AUTO: 0 10E3/UL (ref 0–0.2)
BASOPHILS NFR BLD AUTO: 0 %
EOSINOPHIL # BLD AUTO: 0 10E3/UL (ref 0–0.7)
EOSINOPHIL NFR BLD AUTO: 0 %
ERYTHROCYTE [DISTWIDTH] IN BLOOD BY AUTOMATED COUNT: 14.9 % (ref 10–15)
HCT VFR BLD AUTO: 35.6 % (ref 35–47)
HGB BLD-MCNC: 12.3 G/DL (ref 11.7–15.7)
IMM GRANULOCYTES # BLD: 0.1 10E3/UL
IMM GRANULOCYTES NFR BLD: 1 %
LYMPHOCYTES # BLD AUTO: 1.6 10E3/UL (ref 0.8–5.3)
LYMPHOCYTES NFR BLD AUTO: 15 %
MCH RBC QN AUTO: 31.1 PG (ref 26.5–33)
MCHC RBC AUTO-ENTMCNC: 34.6 G/DL (ref 31.5–36.5)
MCV RBC AUTO: 90 FL (ref 78–100)
MONOCYTES # BLD AUTO: 1.5 10E3/UL (ref 0–1.3)
MONOCYTES NFR BLD AUTO: 14 %
NEUTROPHILS # BLD AUTO: 7.5 10E3/UL (ref 1.6–8.3)
NEUTROPHILS NFR BLD AUTO: 70 %
NRBC # BLD AUTO: 0 10E3/UL
NRBC BLD AUTO-RTO: 0 /100
PLATELET # BLD AUTO: 264 10E3/UL (ref 150–450)
RBC # BLD AUTO: 3.95 10E6/UL (ref 3.8–5.2)
WBC # BLD AUTO: 10.8 10E3/UL (ref 4–11)

## 2024-07-09 PROCEDURE — 84155 ASSAY OF PROTEIN SERUM: CPT | Mod: ZL

## 2024-07-09 PROCEDURE — 85810 BLOOD VISCOSITY EXAMINATION: CPT | Mod: ZL

## 2024-07-09 PROCEDURE — 83521 IG LIGHT CHAINS FREE EACH: CPT | Mod: ZL

## 2024-07-09 PROCEDURE — 86334 IMMUNOFIX E-PHORESIS SERUM: CPT | Performed by: PATHOLOGY

## 2024-07-09 PROCEDURE — 86334 IMMUNOFIX E-PHORESIS SERUM: CPT | Mod: ZL | Performed by: PATHOLOGY

## 2024-07-09 PROCEDURE — 85004 AUTOMATED DIFF WBC COUNT: CPT | Mod: ZL | Performed by: NURSE PRACTITIONER

## 2024-07-09 PROCEDURE — 84165 PROTEIN E-PHORESIS SERUM: CPT | Mod: ZL | Performed by: PATHOLOGY

## 2024-07-09 PROCEDURE — 250N000011 HC RX IP 250 OP 636: Mod: JZ | Performed by: NURSE PRACTITIONER

## 2024-07-09 PROCEDURE — 36415 COLL VENOUS BLD VENIPUNCTURE: CPT

## 2024-07-09 PROCEDURE — 82784 ASSAY IGA/IGD/IGG/IGM EACH: CPT | Mod: ZL

## 2024-07-09 PROCEDURE — 96401 CHEMO ANTI-NEOPL SQ/IM: CPT

## 2024-07-09 PROCEDURE — 84165 PROTEIN E-PHORESIS SERUM: CPT | Mod: 26 | Performed by: PATHOLOGY

## 2024-07-09 PROCEDURE — 82232 ASSAY OF BETA-2 PROTEIN: CPT | Mod: ZL

## 2024-07-09 RX ORDER — MONTELUKAST SODIUM 10 MG/1
10 TABLET ORAL ONCE
Status: COMPLETED | OUTPATIENT
Start: 2024-07-09 | End: 2024-07-09

## 2024-07-09 RX ORDER — DIPHENHYDRAMINE HCL 50 MG
50 CAPSULE ORAL ONCE
Status: COMPLETED | OUTPATIENT
Start: 2024-07-09 | End: 2024-07-09

## 2024-07-09 RX ORDER — ACETAMINOPHEN 325 MG/1
650 TABLET ORAL ONCE
Status: COMPLETED | OUTPATIENT
Start: 2024-07-09 | End: 2024-07-09

## 2024-07-09 RX ORDER — DEXAMETHASONE 4 MG/1
20 TABLET ORAL ONCE
Status: COMPLETED | OUTPATIENT
Start: 2024-07-09 | End: 2024-07-09

## 2024-07-09 RX ADMIN — MONTELUKAST SODIUM 10 MG: 10 TABLET ORAL at 13:48

## 2024-07-09 RX ADMIN — Medication 50 MG: at 13:48

## 2024-07-09 RX ADMIN — DARATUMUMAB AND HYALURONIDASE-FIHJ (HUMAN RECOMBINANT) 1800 MG: 1800; 30000 INJECTION SUBCUTANEOUS at 14:20

## 2024-07-09 RX ADMIN — DEXAMETHASONE 20 MG: 4 TABLET ORAL at 13:48

## 2024-07-09 RX ADMIN — ACETAMINOPHEN 650 MG: 325 TABLET ORAL at 13:48

## 2024-07-09 ASSESSMENT — PAIN SCALES - GENERAL: PAINLEVEL: NO PAIN (0)

## 2024-07-09 NOTE — PROGRESS NOTES
Infusion Nursing Note:  Carmelita Watts presents today for Faspro.    Patient seen by provider today: NO   present during visit today: Not Applicable.    Note: Patient reports rash developed last week and was into urgent care.  Advised to start zyrtec and triamcinolone cream.  Patient reports that her rash has improved however a distinct butterfly rash is present masking her face.  Patient reports Mirna Gonzalez.NP was aware of the rash.  This writer did update provider of the continued rash and patients thoughts on improvement.  Provider did advise she continue with the zyrtec daily until she is seen on next week.  Voicemail left for patient.        Intravenous Access:  Lab draw site right antecubital, Needle type butterfly, Gauge 23.    Treatment Conditions:  Lab Results   Component Value Date    HGB 12.3 07/09/2024    WBC 10.8 07/09/2024    ANEU 1.0 (L) 01/03/2023    ANEUTAUTO 7.5 07/09/2024     07/09/2024        Lab Results   Component Value Date     06/25/2024    POTASSIUM 3.8 06/25/2024    CR 0.80 06/25/2024    PAYAL 9.5 06/25/2024    BILITOTAL 0.3 06/25/2024    ALBUMIN 3.8 06/25/2024    ALT 14 06/25/2024    AST 15 06/25/2024       Results reviewed, labs MET treatment parameters, ok to proceed with treatment.      Post Infusion Assessment:  Patient tolerated infusion without incident.  Blood return noted pre and post infusion.  Site patent and intact, free from redness, edema or discomfort.  No evidence of extravasations.  Access discontinued per protocol.       Discharge Plan:   Patient declined prescription refills.  Discharge instructions reviewed with: Patient.  Copy of AVS reviewed with patient and/or family.  Patient will return next week for next appointment.  Patient discharged in stable condition accompanied by: self.  Departure Mode: Ambulatory.      Anne Lan RN

## 2024-07-10 DIAGNOSIS — J30.2 SEASONAL ALLERGIC RHINITIS, UNSPECIFIED TRIGGER: Primary | ICD-10-CM

## 2024-07-10 LAB — TOTAL PROTEIN SERUM FOR ELP: 6.2 G/DL (ref 6.4–8.3)

## 2024-07-10 RX ORDER — CETIRIZINE HYDROCHLORIDE 10 MG/1
10 TABLET ORAL DAILY
Qty: 10 TABLET | Refills: 0 | Status: SHIPPED | OUTPATIENT
Start: 2024-07-10 | End: 2024-07-23

## 2024-07-10 NOTE — TELEPHONE ENCOUNTER
CETIRIZINE HCL 10 MG TABLET       Last Written Prescription Date:  07/02/2024  Last Fill Quantity: 10,   # refills: 0  Last Office Visit: 10/16/2023  Future Office visit:    Next 5 appointments (look out 90 days)      Jul 23, 2024 10:00 AM  (Arrive by 9:45 AM)  Return Visit with [facility] ([facility] ) [address] [place] [id number]  [phone]             Routing refill request to provider for review/approval because:    Antihistamines Protocol Rmgbea28/10/2024 08:13 AM   Protocol Details Patient is 3-64 years of age    Medication indicated for associated diagnosis          Kimberly Boecker, RN

## 2024-07-11 LAB
ALBUMIN SERPL ELPH-MCNC: 3.9 G/DL (ref 3.7–5.1)
ALPHA1 GLOB SERPL ELPH-MCNC: 0.3 G/DL (ref 0.2–0.4)
ALPHA2 GLOB SERPL ELPH-MCNC: 0.8 G/DL (ref 0.5–0.9)
B-GLOBULIN SERPL ELPH-MCNC: 0.7 G/DL (ref 0.6–1)
B2 MICROGLOB TUMOR MARKER SER-MCNC: 1.9 MG/L
GAMMA GLOB SERPL ELPH-MCNC: 0.5 G/DL (ref 0.7–1.6)
IGA SERPL-MCNC: 24 MG/DL (ref 84–499)
IGG SERPL-MCNC: 533 MG/DL (ref 610–1616)
IGM SERPL-MCNC: 40 MG/DL (ref 35–242)
KAPPA LC FREE SER-MCNC: 0.47 MG/DL (ref 0.33–1.94)
KAPPA LC FREE/LAMBDA FREE SER NEPH: 1.15 {RATIO} (ref 0.26–1.65)
LAMBDA LC FREE SERPL-MCNC: 0.41 MG/DL (ref 0.57–2.63)
M PROTEIN SERPL ELPH-MCNC: 0.1 G/DL
PATH REPORT.COMMENTS IMP SPEC: NORMAL
PROT PATTERN SERPL ELPH-IMP: ABNORMAL
PROT PATTERN SERPL IFE-IMP: NORMAL
VISC SER: 1.5 CP (ref 1.4–1.8)

## 2024-07-16 ENCOUNTER — LAB (OUTPATIENT)
Dept: LAB | Facility: OTHER | Age: 74
End: 2024-07-16
Payer: MEDICARE

## 2024-07-16 ENCOUNTER — TELEPHONE (OUTPATIENT)
Dept: ONCOLOGY | Facility: CLINIC | Age: 74
End: 2024-07-16
Payer: COMMERCIAL

## 2024-07-16 DIAGNOSIS — C90.00 MULTIPLE MYELOMA NOT HAVING ACHIEVED REMISSION (H): ICD-10-CM

## 2024-07-16 DIAGNOSIS — C90.00 MULTIPLE MYELOMA NOT HAVING ACHIEVED REMISSION (H): Primary | ICD-10-CM

## 2024-07-16 LAB — TOTAL PROTEIN SERUM FOR ELP: 6.2 G/DL (ref 6.4–8.3)

## 2024-07-16 PROCEDURE — 83521 IG LIGHT CHAINS FREE EACH: CPT | Mod: ZL

## 2024-07-16 PROCEDURE — 84165 PROTEIN E-PHORESIS SERUM: CPT | Mod: 26 | Performed by: PATHOLOGY

## 2024-07-16 PROCEDURE — 85810 BLOOD VISCOSITY EXAMINATION: CPT | Mod: ZL

## 2024-07-16 PROCEDURE — 86334 IMMUNOFIX E-PHORESIS SERUM: CPT | Mod: ZL | Performed by: PATHOLOGY

## 2024-07-16 PROCEDURE — 84155 ASSAY OF PROTEIN SERUM: CPT | Mod: ZL

## 2024-07-16 PROCEDURE — 82784 ASSAY IGA/IGD/IGG/IGM EACH: CPT | Mod: ZL

## 2024-07-16 PROCEDURE — 36415 COLL VENOUS BLD VENIPUNCTURE: CPT | Mod: ZL

## 2024-07-16 PROCEDURE — 86334 IMMUNOFIX E-PHORESIS SERUM: CPT | Mod: 26 | Performed by: PATHOLOGY

## 2024-07-16 PROCEDURE — 82232 ASSAY OF BETA-2 PROTEIN: CPT | Mod: ZL

## 2024-07-16 PROCEDURE — 84165 PROTEIN E-PHORESIS SERUM: CPT | Mod: ZL | Performed by: PATHOLOGY

## 2024-07-16 RX ORDER — DEXAMETHASONE 4 MG/1
TABLET ORAL
Qty: 10 TABLET | Refills: 0 | Status: SHIPPED | OUTPATIENT
Start: 2024-07-23 | End: 2024-09-17

## 2024-07-16 NOTE — TELEPHONE ENCOUNTER
Oral Chemotherapy Monitoring Program     Medication: Pomalyst   Rx: 1mg PO daily on days 1 through 21 of 28 day cycle   [facility] [id number]  Routine survey questions reviewed.   Rx to be Escribed to SP

## 2024-07-18 LAB
ALBUMIN SERPL ELPH-MCNC: 3.7 G/DL (ref 3.7–5.1)
ALPHA1 GLOB SERPL ELPH-MCNC: 0.3 G/DL (ref 0.2–0.4)
ALPHA2 GLOB SERPL ELPH-MCNC: 0.9 G/DL (ref 0.5–0.9)
B-GLOBULIN SERPL ELPH-MCNC: 0.7 G/DL (ref 0.6–1)
B2 MICROGLOB TUMOR MARKER SER-MCNC: 2.4 MG/L
GAMMA GLOB SERPL ELPH-MCNC: 0.5 G/DL (ref 0.7–1.6)
IGA SERPL-MCNC: 28 MG/DL (ref 84–499)
IGG SERPL-MCNC: 543 MG/DL (ref 610–1616)
IGM SERPL-MCNC: 41 MG/DL (ref 35–242)
KAPPA LC FREE SER-MCNC: 0.61 MG/DL (ref 0.33–1.94)
KAPPA LC FREE/LAMBDA FREE SER NEPH: 1.07 {RATIO} (ref 0.26–1.65)
LAMBDA LC FREE SERPL-MCNC: 0.57 MG/DL (ref 0.57–2.63)
M PROTEIN SERPL ELPH-MCNC: 0.1 G/DL
PROT PATTERN SERPL ELPH-IMP: ABNORMAL
PROT PATTERN SERPL IFE-IMP: NORMAL
VISC SER: 1.5 CP (ref 1.4–1.8)

## 2024-07-19 NOTE — PROGRESS NOTES
Oncology Follow-up Visit    Reason for Visit:  Carmelita is a 73 year old woman with a diagnosis of multiple myeloma, who I am visiting with today for routine follow-up and consideration of ongoing therapy.     Nursing Note and documentation reviewed: Yes    Interval History:   Carlene notes that she thinks she is doing well. She does note that she is a bit more fatigued but forces herself to remain active. Just recently started nother new art class in [place] where she is figure drawing-- enjoying that. She also notes that at times, she feels a little SOB. Felt this most when she was picking up hay from the ditch. Doesn't feel SOB from day to day. Lastly, was seen in ED for a rash since I last saw her. Was started on topical steroid and Zyrtec and rash has resolved.     She otherwise denies signs of infection. No fever, chills, chest pain, or cough. No bleeding. No nausea and vomiting. Has eliminated junk from diet. Trying to eat more healthy and follow hunger cues. Has lost about 3 lbs. No bowel concerns.     Oncologic History  12/31/2018 Presented to the emergency room with vertigo and fatigue.  CT scan of the head was negative and subsequent stress test was negative.  05/03/2019 PCP ordered lab work and noted a total protein of 12.9.  SPEP at that time showed an M spike of 6.2 with a large monoclonal protein seen in the gamma fraction. Urine immunofixation showed a possible small protein band in the gamma fraction  05/31/2019 Evaluated by Dr. Walker with Medical Oncology with plan to rule out myeloma and obtain bone marrow aspiration biopsy as well as a metastatic bone survey along with additional labwork  06/18/2019 Underwent bone marrow aspiration and biopsy  06/24/2019 Seen again by Dr. Walker and CBC showed a hemoglobin of 9.3, M spike 7.3 with monoclonal IgG immunoglobulin of kappa light chain type; serum viscosity was 2.9; quantitative immunoglobulins showed an IgG of 8160, beta-2 microglobulin was 5.8,  BUN was 21 with creatinine is 0.8 and total protein was 13.7.  Quantitative kappa/lambda free light chains showed an elevated ratio of 17.0 bone marrow aspiration biopsy showed plasma cell myeloma with approximately 80% plasma cells.  Immunofixation showed IgG kappa and flow cytometry revealed kappa monotypic plasma cells consistent with clonal plasma cell neoplasm and FISH panel was pending at that time. It was felt she had at least stage II disease based on her beta-2 microglobulin and anemia. Plan was to treat with Velcade 1.3 mg per/m2 days 1, 4, 8 and 11/Decadron 40 mg on days 1, 8 and 15. Initiation of Revlimid with C2 at 25 mg daily days 1 through 14.  Plan was to also obtain an MRI of the lumbar spine to rule out lytic lesion at L3.  She was initiated on Zovirax 400 mg p.o. twice daily.  06/25/2019 C1 of chemotherapy initiated  07/01/2019 Note in chart regarding patient's large co-pay for the Revlimid and no plan at this point to initiate Revlimid and treat only with Velcade and Decadron per Dr. Walker  07/11/2019 MRI lumbar spine shows a pathologic superior endplate compression fracture at L3 without evidence of retropulsed fragment and innumerable enhancing lesions throughout the lumbar spine consistent with history of multiple myeloma.  09/10/2019 Increasing M-spike and kappa lambda ratio  10/01/2019 Initiation of CyBorD  11/17/2020 Plateau of M-spike at 1.2 after 36 cycles of CyBorD  12/01/2020 Initiation of Darzalex Faspro injection  03/23/2021 M-spike increased form 1.0 to 1.2 and velcade and dexamethasone added to plan  04/12/2022 Treatment HOLIDAY commenced with Mspike 0.3 after 22 cycles of treatent  05/27/2022 Received Evusheld while on treatment holiday.   07/18/2023 Mspike 0.8. Other labs stable. PET scan ordered.   08/02/2023 PET completed showing negative study. No evidence of mass or lymphadenopathy. No concerning hypermetabolism is present.  08/14/2023 Met with Dr. Walker. Given  biochemical progression, he did feel appropriate to resume therapy again with Darzalex faspro  03/12/2023 Following C7, mspike magy from 0.2-->0.8. PET ordered which was negative. Again increased frequency of Darzalex with anticipation of adding in Pomalyst with C2.    04/29/2024 Addition of Pomalyst with C2    Current Chemo Regime/TX: Darzalex faspro injection 1800mg subcutaneous per protocol with weekly Dex and Pomalyst 1 mg days 1-21 of 28 day cycle  Current Cycle: C5   Completed cycles: 4  **Pom added C2 (4/29)       Previous treatment:   Velcade 1.3 mg/m2 days 1, 4, 8 and 11 with Decadron 40 mg days 1, 8 and 15 x 4 cycles;   Velcade 1.5mg.m2/cyclophosphamide 150mg every 7 days on days 1,8,15 and 22/decadron 40mg days 1,8,15,22 ; Darzalex faspro injection 1800mg subcutaneous per protocol    Past Medical History:   Diagnosis Date    Arthritis     Cyst of breast     Depressive disorder     Diabetes mellitus, type 2 (H) 01/18/2021    Essential hypertension 10/01/2015    Major depressive disorder, recurrent episode, mild (H24) 10/01/2015    Mixed hyperlipidemia 10/01/2015    Multiple myeloma not having achieved remission (H) 06/24/2019    Other specified disorders of bladder 07/09/2012    Irritable Bladder    Seasonal allergies 10/01/2015    Unspecified essential hypertension 03/19/2007    Unspecified sinusitis (chronic) 09/05/2007       Past Surgical History:   Procedure Laterality Date    APPENDECTOMY      BONE MARROW BIOPSY, BONE SPECIMEN, NEEDLE/TROCAR N/A 06/18/2019    Procedure: BONE MARROW BIOPSY;  Surgeon: Maciej Sanz MD;  Location: [facility] OR    CHOLECYSTECTOMY      COLONOSCOPY  07/18/2006    repeat 10 years    COLONOSCOPY N/A 12/30/2016    Procedure: COLONOSCOPY;  Surgeon: Bhaskar Franklin DO;  Location: [facility] OR    PUNC/ASPIR BREAST CYST Left 1995    benign    SINUS SURGERY      TUBAL STERILIZATION         Family History   Problem Relation Age of Onset    Breast Cancer Mother 60        Cause of Death; 1  "breast    Parkinsonism Father         \"Possible\"    Coronary Artery Disease Father     Pacemaker Father     Thyroid Disease Daughter     Breast Cancer Maternal Aunt         over 50    Diabetes No family hx of     Hypertension No family hx of     Hyperlipidemia No family hx of     Cerebrovascular Disease No family hx of     Colon Cancer No family hx of     Prostate Cancer No family hx of     Genetic Disorder No family hx of     Asthma No family hx of     Anesthesia Reaction No family hx of        Social History     Socioeconomic History    Marital status:      Spouse name: Not on file    Number of children: Not on file    Years of education: Not on file    Highest education level: Not on file   Occupational History    Occupation: Financial     Comment:  - ([employer])   Tobacco Use    Smoking status: Never    Smokeless tobacco: Never    Tobacco comments:     Tried to Quit (YES); QUIT in 1971; Passive Exposure (NO)   Vaping Use    Vaping status: Never Used   Substance and Sexual Activity    Alcohol use: Yes     Comment: RARELY    Drug use: No    Sexual activity: Yes     Partners: Male     Birth control/protection: None   Other Topics Concern     Service Not Asked    Blood Transfusions Not Asked    Caffeine Concern Yes     Comment: Coffee >6 cups daily    Occupational Exposure Not Asked    Hobby Hazards Not Asked    Sleep Concern Not Asked    Stress Concern Not Asked    Weight Concern Not Asked    Special Diet Not Asked    Back Care Not Asked    Exercise Not Asked    Bike Helmet Not Asked    Seat Belt Not Asked    Self-Exams Not Asked    Parent/sibling w/ CABG, MI or angioplasty before 65F 55M? No   Social History Narrative    Not on file     Social Determinants of Health     Financial Resource Strain: Low Risk  (11/27/2023)    Financial Resource Strain     Within the past 12 months, have you or your family members you live with been unable to get utilities (heat, electricity) when it was really " needed?: No   Food Insecurity: Low Risk  (11/27/2023)    Food Insecurity     Within the past 12 months, did you worry that your food would run out before you got money to buy more?: No     Within the past 12 months, did the food you bought just not last and you didn t have money to get more?: No   Transportation Needs: Low Risk  (11/27/2023)    Transportation Needs     Within the past 12 months, has lack of transportation kept you from medical appointments, getting your medicines, non-medical meetings or appointments, work, or from getting things that you need?: No   Physical Activity: Not on file   Stress: Not on file   Social Connections: Not on file   Interpersonal Safety: Low Risk  (11/27/2023)    Interpersonal Safety     Do you feel physically and emotionally safe where you currently live?: Yes     Within the past 12 months, have you been hit, slapped, kicked or otherwise physically hurt by someone?: No     Within the past 12 months, have you been humiliated or emotionally abused in other ways by your partner or ex-partner?: No   Housing Stability: Low Risk  (11/27/2023)    Housing Stability     Do you have housing? : Yes     Are you worried about losing your housing?: No       Current Outpatient Medications   Medication Sig Dispense Refill    acyclovir (ZOVIRAX) 400 MG tablet Take 1 tablet (400 mg) by mouth 2 times daily Start 1 week prior to daratumumab and continue for 3 months after daratumumab treatment complete. 60 tablet 11    aspirin (ASA) 81 MG chewable tablet Take 81 mg by mouth daily      atorvastatin (LIPITOR) 20 MG tablet Take 1 tablet (20 mg) by mouth daily 90 tablet 3    cetirizine (ZYRTEC) 10 MG tablet Take 1 tablet (10 mg) by mouth daily 30 tablet 1    dexAMETHasone (DECADRON) 4 MG tablet Take Dex 20 mg (5 tablets) weekly 10 tablet 0    diphenhydrAMINE (BENADRYL) 25 MG capsule Take 25 mg by mouth daily      fluticasone (FLONASE) 50 MCG/ACT nasal spray SPRAY 2 SPRAYS INTO BOTH NOSTRILS DAILY 48  "mL 0    hydrocortisone (CORTAID) 1 % external cream Apply topically 2 times daily sparingly over rash to face and chest 45 g 0    L-Lysine HCl 500 MG CAPS Take by mouth daily      losartan (COZAAR) 50 MG tablet TAKE 1 TABLET BY MOUTH DAILY 90 tablet 3    pomalidomide (POMALYST) 1 MG capsule Take 1 capsule (1 mg) by mouth daily for 21 days Day 1 thru 21 of each 28 day cycle. Swallow whole with water. 21 capsule 0    prochlorperazine (COMPAZINE) 10 MG tablet Take 1 tablet (10 mg) by mouth every 6 hours as needed for nausea or vomiting 30 tablet 2    sertraline (ZOLOFT) 50 MG tablet TAKE 1/2 TABLET BY MOUTH DAILY 30 tablet 4     No current facility-administered medications for this visit.        Allergies   Allergen Reactions    Lisinopril Cough    Phenylephrine Hcl Other (See Comments)     **Entex  HEADACHE (SEVERE)    Phenylpropanolamine Other (See Comments)     **Entex  HEADACHE (SEVERE)    Pseudoephedrine Tannate Other (See Comments)     **Entex  HEADACHE (SEVERE)    Levofloxacin Rash     **Levaquin    Moxifloxacin Hcl [Moxifloxacin Hcl In Nacl] Rash       Review Of Systems:  A complete review of systems is negative except for the above mentioned items in the interval history.     Physical Exam:  /60 (BP Location: Left arm, Patient Position: Sitting, Cuff Size: Adult Regular)   Pulse 76   Temp 98.6  F (37  C) (Tympanic)   Ht 1.6 m (5' 3\")   Wt 78.4 kg (172 lb 13.5 oz)   SpO2 95%   BMI 30.62 kg/m    GENERAL APPEARANCE: Alert and in no acute distress.  HEENT: Eyes appear normal without scleral icterus. Extraocular movements intact.   NECK:   Supple with normal range of motion. No asymmetry or masses.  LYMPHATICS: No palpable cervical, supraclavicular nodes.  RESP: Lungs clear to auscultation bilaterally, respirations regular and easy.  CARDIOVASCULAR: Regular rate and rhythm. Normal S1, S2; no murmur, gallop, or rub.  ABDOMEN: Soft, non-tender, non-distended. No palpable organomegaly or " masses.  MUSCULOSKELETAL: Extremities without gross deformities noted. No edema of bilateral lower extremities.  SKIN: No suspicious lesions or rashes.  NEURO: Alert and oriented x 3.  Gait steady.  PSYCHIATRIC: Mentation and affect appear normal.  Mood appropriate.    Laboratory:  Results for orders placed or performed in visit on 07/23/24   Comprehensive metabolic panel     Status: Normal   Result Value Ref Range    Sodium 139 135 - 145 mmol/L    Potassium 4.2 3.4 - 5.3 mmol/L    Carbon Dioxide (CO2) 24 22 - 29 mmol/L    Anion Gap 10 7 - 15 mmol/L    Urea Nitrogen 17.3 8.0 - 23.0 mg/dL    Creatinine 0.85 0.51 - 0.95 mg/dL    GFR Estimate 71 >60 mL/min/1.73m2    Calcium 9.5 8.8 - 10.4 mg/dL    Chloride 105 98 - 107 mmol/L    Glucose 96 70 - 99 mg/dL    Alkaline Phosphatase 143 40 - 150 U/L    AST 17 0 - 45 U/L    ALT 14 0 - 50 U/L    Protein Total 6.6 6.4 - 8.3 g/dL    Albumin 3.9 3.5 - 5.2 g/dL    Bilirubin Total 0.3 <=1.2 mg/dL   CBC with platelets and differential     Status: Abnormal   Result Value Ref Range    WBC Count 5.6 4.0 - 11.0 10e3/uL    RBC Count 4.11 3.80 - 5.20 10e6/uL    Hemoglobin 12.8 11.7 - 15.7 g/dL    Hematocrit 37.4 35.0 - 47.0 %    MCV 91 78 - 100 fL    MCH 31.1 26.5 - 33.0 pg    MCHC 34.2 31.5 - 36.5 g/dL    RDW 15.1 (H) 10.0 - 15.0 %    Platelet Count 255 150 - 450 10e3/uL    % Neutrophils 38 %    % Lymphocytes 37 %    % Monocytes 14 %    % Eosinophils 9 %    % Basophils 2 %    % Immature Granulocytes 1 %    NRBCs per 100 WBC 0 <1 /100    Absolute Neutrophils 2.1 1.6 - 8.3 10e3/uL    Absolute Lymphocytes 2.0 0.8 - 5.3 10e3/uL    Absolute Monocytes 0.8 0.0 - 1.3 10e3/uL    Absolute Eosinophils 0.5 0.0 - 0.7 10e3/uL    Absolute Basophils 0.1 0.0 - 0.2 10e3/uL    Absolute Immature Granulocytes 0.1 <=0.4 10e3/uL    Absolute NRBCs 0.0 10e3/uL   CBC with Platelets & Differential     Status: Abnormal    Narrative    The following orders were created for panel order CBC with Platelets &  Differential.  Procedure                               Abnormality         Status                     ---------                               -----------         ------                     CBC with platelets and d...[[id number]]  Abnormal            Final result                 Please view results for these tests on the individual orders.       Component      Latest Ref Rng 7/16/2024  12:32 PM   Albumin Fraction      3.7 - 5.1 g/dL 3.7    Alpha 1 Fraction      0.2 - 0.4 g/dL 0.3    Alpha 2 Fraction      0.5 - 0.9 g/dL 0.9    Beta Fraction      0.6 - 1.0 g/dL 0.7    Gamma Fraction      0.7 - 1.6 g/dL 0.5 (L)    Monoclonal Peak      <=0.0 g/dL 0.1 (H)    ELP Interpretation: Two small monoclonal proteins (each 0.1 g/dL) seen in the gamma fraction. See immunofixation report on same specimen. Hypogammaglobulinemia. Pathologic significance requires clinical correlation. EKNDRA Akins M.D., Ph.D., Pathologist.    IGG      610 - 1,616 mg/dL 543 (L)    IGA      84 - 499 mg/dL 28 (L)    IGM      35 - 242 mg/dL 41    Gilead Free Lt Chain      0.33 - 1.94 mg/dL 0.61    Lambda Free Lt Chain      0.57 - 2.63 mg/dL 0.57    Kappa Lambda Ratio      0.26 - 1.65  1.07    Beta-2-Microglobulin      <2.3 mg/L 2.4 (H)    Viscosity Index      1.4 - 1.8  1.5    Immunofixation ELP Two monoclonal IgG immunoglobulins of kappa light chain type, with suppression of background immunoglobulins. Monoclonal antibody therapeutics (e.g. Daratumumab) may appear as monoclonal proteins on serum electrophoresis and immunofixation. Results should be interpreted with caution. Pathologic significance requires clinical correlation. KENDRA Akins M.D., Ph.D., Pathologist    Total Protein Serum for ELP      6.4 - 8.3 g/dL 6.2 (L)       Legend:  (L) Low  (H) High  Imaging Studies:  None this visit.     ASSESSMENT/PLAN:  #1 Multiple myeloma IgG kappa multiple myeloma with 80% involvement of the bone marrow diagnosed June 2019 initially treated with Velcade and  Decadron and received 4 cycles with increasing M-spike and kappa/lambda ratio.  Treatment changed to CyBorD on 10/1/2019.  M-spike plateau at 1.2 and treatment changed to monotherapy with Darzalex Faspro injection. M spike declined to 1.0 then increased again to 1.2 so Velcade and Dex added to her treatment plan with cycle 5 therapy.     Following C22 (April 2022), she did initiate a treatment holiday when her Mspike was 0.3. We had followed her counts for over a year. Unfortunately, most recently, she did see a rise in her mspike to 0.8. PET completed was negative. Dr. Walker felt this was a biochemical progression and after discussion with patient, elected to resume treatment with Darzalex faspro. Following 8 cycles, mspike began to rise. PET was negative but feeling like she wasn't responding to Darzalex alone, decided to add in Pomalyst 1 mg p.o. daily on days 1 through 21 to begin with cycle 2 in addition to dexamethasone 20 mg p.o. on days 1 ,8,15,22.    Today, she returns for C5 Darzalex and Pomalyst. She continues to tolerate with modest side effects and her mspike continues to drop, down to 0.1. Tolerating with modest side effects. No contraindications to proceeding. Will start Pom today with Day 1 Darzalex. Will return for Day 15 pending labs and Day 22 myeloma labs. I will see her back in 4 weeks, sooner with concerns.     #2 Lytic lesions Hx lytic lesion at L3. Due for q3m Zometa today. She remains on calcium with vitamin D twice a day. Weight bearing activity.    #3 Rash About a week after C4D1. Seen in urgent care. Started Zyrtec and it resolved. Will continue daily zyrtec. Refill provided.     #4 Fatigue Discussed. Using WEL program. Staying active. Will monitor.     #5 SOB Seems to be only with exertion. Did remind that we need to closely monitor this as she does have increased risk of PE. Seems to be only when she exerts but will monitor closely.         Patient in agreement with plan and  verbalizes understanding. Agrees to call with any questions or concerns.      39 minutes spent in the patient's encounter today with time spent in review of patient's chart along with chart preparation and review of the treatment plan and signing of treatment plan.  Time was also spent with the patient in obtaining a review of systems and performing a physical exam along with detailed review of all test results. Time was also spent in discussing plan for future follow-up and relating instructions for follow-up and in placing future orders.      FLO Duque, FNP-C

## 2024-07-23 ENCOUNTER — LAB (OUTPATIENT)
Dept: LAB | Facility: OTHER | Age: 74
End: 2024-07-23
Attending: NURSE PRACTITIONER
Payer: MEDICARE

## 2024-07-23 ENCOUNTER — ONCOLOGY VISIT (OUTPATIENT)
Dept: ONCOLOGY | Facility: OTHER | Age: 74
End: 2024-07-23
Attending: NURSE PRACTITIONER
Payer: MEDICARE

## 2024-07-23 ENCOUNTER — INFUSION THERAPY VISIT (OUTPATIENT)
Dept: INFUSION THERAPY | Facility: OTHER | Age: 74
End: 2024-07-23
Attending: INTERNAL MEDICINE
Payer: MEDICARE

## 2024-07-23 VITALS
OXYGEN SATURATION: 95 % | DIASTOLIC BLOOD PRESSURE: 60 MMHG | HEART RATE: 76 BPM | TEMPERATURE: 98.6 F | HEIGHT: 63 IN | WEIGHT: 172.84 LBS | BODY MASS INDEX: 30.62 KG/M2 | SYSTOLIC BLOOD PRESSURE: 122 MMHG

## 2024-07-23 VITALS — RESPIRATION RATE: 18 BRPM | DIASTOLIC BLOOD PRESSURE: 76 MMHG | HEART RATE: 64 BPM | SYSTOLIC BLOOD PRESSURE: 114 MMHG

## 2024-07-23 DIAGNOSIS — R21 RASH: ICD-10-CM

## 2024-07-23 DIAGNOSIS — R53.83 CHEMOTHERAPY-INDUCED FATIGUE: ICD-10-CM

## 2024-07-23 DIAGNOSIS — C90.00 MULTIPLE MYELOMA NOT HAVING ACHIEVED REMISSION (H): Primary | ICD-10-CM

## 2024-07-23 DIAGNOSIS — R06.09 DOE (DYSPNEA ON EXERTION): ICD-10-CM

## 2024-07-23 DIAGNOSIS — M89.9 BONE LESION: ICD-10-CM

## 2024-07-23 DIAGNOSIS — C90.00 MULTIPLE MYELOMA NOT HAVING ACHIEVED REMISSION (H): ICD-10-CM

## 2024-07-23 DIAGNOSIS — T45.1X5A CHEMOTHERAPY-INDUCED FATIGUE: ICD-10-CM

## 2024-07-23 LAB
ALBUMIN SERPL BCG-MCNC: 3.9 G/DL (ref 3.5–5.2)
ALP SERPL-CCNC: 143 U/L (ref 40–150)
ALT SERPL W P-5'-P-CCNC: 14 U/L (ref 0–50)
ANION GAP SERPL CALCULATED.3IONS-SCNC: 10 MMOL/L (ref 7–15)
AST SERPL W P-5'-P-CCNC: 17 U/L (ref 0–45)
BASOPHILS # BLD AUTO: 0.1 10E3/UL (ref 0–0.2)
BASOPHILS NFR BLD AUTO: 2 %
BILIRUB SERPL-MCNC: 0.3 MG/DL
BUN SERPL-MCNC: 17.3 MG/DL (ref 8–23)
CALCIUM SERPL-MCNC: 9.5 MG/DL (ref 8.8–10.4)
CHLORIDE SERPL-SCNC: 105 MMOL/L (ref 98–107)
CREAT SERPL-MCNC: 0.85 MG/DL (ref 0.51–0.95)
EGFRCR SERPLBLD CKD-EPI 2021: 71 ML/MIN/1.73M2
EOSINOPHIL # BLD AUTO: 0.5 10E3/UL (ref 0–0.7)
EOSINOPHIL NFR BLD AUTO: 9 %
ERYTHROCYTE [DISTWIDTH] IN BLOOD BY AUTOMATED COUNT: 15.1 % (ref 10–15)
GLUCOSE SERPL-MCNC: 96 MG/DL (ref 70–99)
HCO3 SERPL-SCNC: 24 MMOL/L (ref 22–29)
HCT VFR BLD AUTO: 37.4 % (ref 35–47)
HGB BLD-MCNC: 12.8 G/DL (ref 11.7–15.7)
IMM GRANULOCYTES # BLD: 0.1 10E3/UL
IMM GRANULOCYTES NFR BLD: 1 %
LYMPHOCYTES # BLD AUTO: 2 10E3/UL (ref 0.8–5.3)
LYMPHOCYTES NFR BLD AUTO: 37 %
MCH RBC QN AUTO: 31.1 PG (ref 26.5–33)
MCHC RBC AUTO-ENTMCNC: 34.2 G/DL (ref 31.5–36.5)
MCV RBC AUTO: 91 FL (ref 78–100)
MONOCYTES # BLD AUTO: 0.8 10E3/UL (ref 0–1.3)
MONOCYTES NFR BLD AUTO: 14 %
NEUTROPHILS # BLD AUTO: 2.1 10E3/UL (ref 1.6–8.3)
NEUTROPHILS NFR BLD AUTO: 38 %
NRBC # BLD AUTO: 0 10E3/UL
NRBC BLD AUTO-RTO: 0 /100
PLATELET # BLD AUTO: 255 10E3/UL (ref 150–450)
POTASSIUM SERPL-SCNC: 4.2 MMOL/L (ref 3.4–5.3)
PROT SERPL-MCNC: 6.6 G/DL (ref 6.4–8.3)
RBC # BLD AUTO: 4.11 10E6/UL (ref 3.8–5.2)
SODIUM SERPL-SCNC: 139 MMOL/L (ref 135–145)
WBC # BLD AUTO: 5.6 10E3/UL (ref 4–11)

## 2024-07-23 PROCEDURE — 96401 CHEMO ANTI-NEOPL SQ/IM: CPT | Mod: XU

## 2024-07-23 PROCEDURE — 85025 COMPLETE CBC W/AUTO DIFF WBC: CPT | Mod: ZL

## 2024-07-23 PROCEDURE — 96365 THER/PROPH/DIAG IV INF INIT: CPT

## 2024-07-23 PROCEDURE — 99214 OFFICE O/P EST MOD 30 MIN: CPT | Performed by: NURSE PRACTITIONER

## 2024-07-23 PROCEDURE — 36415 COLL VENOUS BLD VENIPUNCTURE: CPT | Mod: ZL

## 2024-07-23 PROCEDURE — G0463 HOSPITAL OUTPT CLINIC VISIT: HCPCS

## 2024-07-23 PROCEDURE — 82040 ASSAY OF SERUM ALBUMIN: CPT | Mod: ZL

## 2024-07-23 PROCEDURE — G0463 HOSPITAL OUTPT CLINIC VISIT: HCPCS | Mod: 25

## 2024-07-23 PROCEDURE — 250N000011 HC RX IP 250 OP 636: Mod: JZ | Performed by: NURSE PRACTITIONER

## 2024-07-23 RX ORDER — EPINEPHRINE 1 MG/ML
0.3 INJECTION, SOLUTION, CONCENTRATE INTRAVENOUS EVERY 5 MIN PRN
Status: CANCELLED | OUTPATIENT
Start: 2024-07-23

## 2024-07-23 RX ORDER — LORAZEPAM 2 MG/ML
0.5 INJECTION INTRAMUSCULAR EVERY 4 HOURS PRN
Status: CANCELLED | OUTPATIENT
Start: 2024-08-06

## 2024-07-23 RX ORDER — EPINEPHRINE 1 MG/ML
0.3 INJECTION, SOLUTION, CONCENTRATE INTRAVENOUS EVERY 5 MIN PRN
Status: CANCELLED | OUTPATIENT
Start: 2024-08-06

## 2024-07-23 RX ORDER — ACETAMINOPHEN 325 MG/1
650 TABLET ORAL ONCE
Status: COMPLETED | OUTPATIENT
Start: 2024-07-23 | End: 2024-07-23

## 2024-07-23 RX ORDER — ACETAMINOPHEN 325 MG/1
650 TABLET ORAL
Status: CANCELLED | OUTPATIENT
Start: 2024-07-23

## 2024-07-23 RX ORDER — DIPHENHYDRAMINE HYDROCHLORIDE 50 MG/ML
50 INJECTION INTRAMUSCULAR; INTRAVENOUS
Status: CANCELLED
Start: 2024-07-23

## 2024-07-23 RX ORDER — ALBUTEROL SULFATE 90 UG/1
1-2 AEROSOL, METERED RESPIRATORY (INHALATION)
Status: CANCELLED
Start: 2024-07-23

## 2024-07-23 RX ORDER — MONTELUKAST SODIUM 10 MG/1
10 TABLET ORAL ONCE
Status: CANCELLED | OUTPATIENT
Start: 2024-08-06

## 2024-07-23 RX ORDER — DIPHENHYDRAMINE HCL 50 MG
50 CAPSULE ORAL
Status: CANCELLED | OUTPATIENT
Start: 2024-08-06

## 2024-07-23 RX ORDER — DIPHENHYDRAMINE HYDROCHLORIDE 50 MG/ML
50 INJECTION INTRAMUSCULAR; INTRAVENOUS
Status: CANCELLED
Start: 2024-08-06

## 2024-07-23 RX ORDER — ALBUTEROL SULFATE 0.83 MG/ML
2.5 SOLUTION RESPIRATORY (INHALATION)
Status: CANCELLED | OUTPATIENT
Start: 2024-07-23

## 2024-07-23 RX ORDER — ALBUTEROL SULFATE 0.83 MG/ML
2.5 SOLUTION RESPIRATORY (INHALATION)
Status: CANCELLED | OUTPATIENT
Start: 2024-08-06

## 2024-07-23 RX ORDER — MEPERIDINE HYDROCHLORIDE 25 MG/ML
25 INJECTION INTRAMUSCULAR; INTRAVENOUS; SUBCUTANEOUS EVERY 30 MIN PRN
Status: CANCELLED | OUTPATIENT
Start: 2024-07-23

## 2024-07-23 RX ORDER — ACETAMINOPHEN 325 MG/1
650 TABLET ORAL ONCE
Status: CANCELLED | OUTPATIENT
Start: 2024-07-23

## 2024-07-23 RX ORDER — METHYLPREDNISOLONE SODIUM SUCCINATE 125 MG/2ML
125 INJECTION, POWDER, LYOPHILIZED, FOR SOLUTION INTRAMUSCULAR; INTRAVENOUS
Status: CANCELLED
Start: 2024-07-23

## 2024-07-23 RX ORDER — ACETAMINOPHEN 325 MG/1
650 TABLET ORAL
Status: CANCELLED | OUTPATIENT
Start: 2024-08-06

## 2024-07-23 RX ORDER — ALBUTEROL SULFATE 90 UG/1
1-2 AEROSOL, METERED RESPIRATORY (INHALATION)
Status: CANCELLED
Start: 2024-08-06

## 2024-07-23 RX ORDER — DIPHENHYDRAMINE HCL 50 MG
50 CAPSULE ORAL ONCE
Status: CANCELLED | OUTPATIENT
Start: 2024-08-06

## 2024-07-23 RX ORDER — DIPHENHYDRAMINE HCL 50 MG
50 CAPSULE ORAL ONCE
Status: CANCELLED | OUTPATIENT
Start: 2024-07-23

## 2024-07-23 RX ORDER — DIPHENHYDRAMINE HCL 50 MG
50 CAPSULE ORAL
Status: CANCELLED | OUTPATIENT
Start: 2024-07-23

## 2024-07-23 RX ORDER — DIPHENHYDRAMINE HCL 50 MG
50 CAPSULE ORAL ONCE
Status: COMPLETED | OUTPATIENT
Start: 2024-07-23 | End: 2024-07-23

## 2024-07-23 RX ORDER — METHYLPREDNISOLONE SODIUM SUCCINATE 125 MG/2ML
125 INJECTION, POWDER, LYOPHILIZED, FOR SOLUTION INTRAMUSCULAR; INTRAVENOUS
Status: CANCELLED
Start: 2024-08-06

## 2024-07-23 RX ORDER — CETIRIZINE HYDROCHLORIDE 10 MG/1
10 TABLET ORAL DAILY
Qty: 30 TABLET | Refills: 1 | Status: SHIPPED | OUTPATIENT
Start: 2024-07-23

## 2024-07-23 RX ORDER — DEXAMETHASONE 4 MG/1
20 TABLET ORAL ONCE
Status: COMPLETED | OUTPATIENT
Start: 2024-07-23 | End: 2024-07-23

## 2024-07-23 RX ORDER — MONTELUKAST SODIUM 10 MG/1
10 TABLET ORAL ONCE
Status: COMPLETED | OUTPATIENT
Start: 2024-07-23 | End: 2024-07-23

## 2024-07-23 RX ORDER — ZOLEDRONIC ACID 0.04 MG/ML
4 INJECTION, SOLUTION INTRAVENOUS ONCE
Status: COMPLETED | OUTPATIENT
Start: 2024-07-23 | End: 2024-07-23

## 2024-07-23 RX ORDER — ACETAMINOPHEN 325 MG/1
650 TABLET ORAL ONCE
Status: CANCELLED | OUTPATIENT
Start: 2024-08-06

## 2024-07-23 RX ORDER — MEPERIDINE HYDROCHLORIDE 25 MG/ML
25 INJECTION INTRAMUSCULAR; INTRAVENOUS; SUBCUTANEOUS EVERY 30 MIN PRN
Status: CANCELLED | OUTPATIENT
Start: 2024-08-06

## 2024-07-23 RX ORDER — LORAZEPAM 2 MG/ML
0.5 INJECTION INTRAMUSCULAR EVERY 4 HOURS PRN
Status: CANCELLED | OUTPATIENT
Start: 2024-07-23

## 2024-07-23 RX ORDER — ZOLEDRONIC ACID 0.04 MG/ML
4 INJECTION, SOLUTION INTRAVENOUS ONCE
OUTPATIENT
Start: 2024-07-23 | End: 2024-07-23

## 2024-07-23 RX ORDER — DEXAMETHASONE 4 MG/1
20 TABLET ORAL ONCE
Status: CANCELLED | OUTPATIENT
Start: 2024-08-06

## 2024-07-23 RX ADMIN — MONTELUKAST SODIUM 10 MG: 10 TABLET ORAL at 11:01

## 2024-07-23 RX ADMIN — DEXAMETHASONE 20 MG: 4 TABLET ORAL at 11:01

## 2024-07-23 RX ADMIN — Medication 250 ML: at 11:23

## 2024-07-23 RX ADMIN — ACETAMINOPHEN 650 MG: 325 TABLET ORAL at 11:00

## 2024-07-23 RX ADMIN — ZOLEDRONIC ACID 4 MG: 0.04 INJECTION, SOLUTION INTRAVENOUS at 11:23

## 2024-07-23 RX ADMIN — DARATUMUMAB AND HYALURONIDASE-FIHJ (HUMAN RECOMBINANT) 1800 MG: 1800; 30000 INJECTION SUBCUTANEOUS at 11:51

## 2024-07-23 RX ADMIN — Medication 50 MG: at 11:01

## 2024-07-23 ASSESSMENT — PAIN SCALES - GENERAL: PAINLEVEL: NO PAIN (0)

## 2024-07-23 NOTE — NURSING NOTE
"Oncology Rooming Note    July 23, 2024 10:03 AM   Carmelita Watts is a 73 year old female who presents for:    Chief Complaint   Patient presents with    Oncology Clinic Visit     Follow up. Multiple myeloma not having achieved remission      Initial Vitals: /60 (BP Location: Left arm, Patient Position: Sitting, Cuff Size: Adult Regular)   Pulse 76   Temp 98.6  F (37  C) (Tympanic)   Ht 1.6 m (5' 3\")   Wt 78.4 kg (172 lb 13.5 oz)   SpO2 95%   BMI 30.62 kg/m   Estimated body mass index is 30.62 kg/m  as calculated from the following:    Height as of this encounter: 1.6 m (5' 3\").    Weight as of this encounter: 78.4 kg (172 lb 13.5 oz). Body surface area is 1.87 meters squared.  No Pain (0) Comment: Data Unavailable   No LMP recorded. Patient is postmenopausal.  Allergies reviewed: Yes  Medications reviewed: Yes    Medications: Medication refills not needed today.  Pharmacy name entered into [facility]:    [id number] - [place], [place] - [address] AT [facility] [address]  [facility] - [place], [place] - [address]    Frailty Screening:   Is the patient here for a new oncology consult visit in cancer care? 2. No      Clinical concerns: none       Julia Andrade LPN              "

## 2024-07-23 NOTE — PROGRESS NOTES
Infusion Nursing Note:  Carmelita M Mikemaurice presents today for Faspro / Zometa.    Patient seen by provider today: Yes: Nida Gonzalez NP ONC  Home medications, allergies, vitals, fall risk, pain and abuse screen done at [facility] ONC appt earlier this date. All reviewed by this nurse prior to treating. Patient denies questions or concerns not already discussed with provider prior, wishes to proceed with treatment.    present during visit today: Not Applicable.    Note: Call to Nida Gonzalez NP ONC to ask for ok to add normal saline flush to Zometa plan. TORB with authorization to do so per current practice (3-20mL q1 min PRN).      Intravenous Access:  Peripheral IV placed.    Treatment Conditions:  Lab Results   Component Value Date    HGB 12.8 07/23/2024    WBC 5.6 07/23/2024    ANEU 1.0 (L) 01/03/2023    ANEUTAUTO 2.1 07/23/2024     07/23/2024        Lab Results   Component Value Date     07/23/2024    POTASSIUM 4.2 07/23/2024    CR 0.85 07/23/2024    PAYAL 9.5 07/23/2024    BILITOTAL 0.3 07/23/2024    ALBUMIN 3.9 07/23/2024    ALT 14 07/23/2024    AST 17 07/23/2024       Results reviewed, labs MET treatment parameters, ok to proceed with treatment.      Post Infusion Assessment:  Patient tolerated infusion without incident.  Patient tolerated injection without incident.  The following medication was given:     MEDICATION: FASPRO  ROUTE: SQ  SITE: RUQ abdomen    Site patent and intact, free from redness, edema or discomfort.  No evidence of extravasations.  Access discontinued per protocol.        Discharge Plan:   Patient discharged in stable condition accompanied by: self.  Offered / given ONC AVS  Departure Mode: Ambulatory.

## 2024-07-25 ENCOUNTER — TRANSFERRED RECORDS (OUTPATIENT)
Dept: HEALTH INFORMATION MANAGEMENT | Facility: CLINIC | Age: 74
End: 2024-07-25
Payer: COMMERCIAL

## 2024-07-25 LAB — RETINOPATHY: NEGATIVE

## 2024-08-06 ENCOUNTER — INFUSION THERAPY VISIT (OUTPATIENT)
Dept: INFUSION THERAPY | Facility: OTHER | Age: 74
End: 2024-08-06
Attending: INTERNAL MEDICINE
Payer: MEDICARE

## 2024-08-06 DIAGNOSIS — C90.00 MULTIPLE MYELOMA NOT HAVING ACHIEVED REMISSION (H): Primary | ICD-10-CM

## 2024-08-06 LAB
BASOPHILS # BLD MANUAL: 0 10E3/UL (ref 0–0.2)
BASOPHILS NFR BLD MANUAL: 0 %
EOSINOPHIL # BLD MANUAL: 0 10E3/UL (ref 0–0.7)
EOSINOPHIL NFR BLD MANUAL: 0 %
ERYTHROCYTE [DISTWIDTH] IN BLOOD BY AUTOMATED COUNT: 14.6 % (ref 10–15)
HCT VFR BLD AUTO: 36.9 % (ref 35–47)
HGB BLD-MCNC: 12.5 G/DL (ref 11.7–15.7)
LYMPHOCYTES # BLD MANUAL: 2.2 10E3/UL (ref 0.8–5.3)
LYMPHOCYTES NFR BLD MANUAL: 18 %
MCH RBC QN AUTO: 30.9 PG (ref 26.5–33)
MCHC RBC AUTO-ENTMCNC: 33.9 G/DL (ref 31.5–36.5)
MCV RBC AUTO: 91 FL (ref 78–100)
MONOCYTES # BLD MANUAL: 1.1 10E3/UL (ref 0–1.3)
MONOCYTES NFR BLD MANUAL: 9 %
NEUTROPHILS # BLD MANUAL: 8.8 10E3/UL (ref 1.6–8.3)
NEUTROPHILS NFR BLD MANUAL: 73 %
NRBC # BLD AUTO: 0 10E3/UL
NRBC BLD AUTO-RTO: 0 /100
PLAT MORPH BLD: ABNORMAL
PLATELET # BLD AUTO: 282 10E3/UL (ref 150–450)
RBC # BLD AUTO: 4.05 10E6/UL (ref 3.8–5.2)
RBC MORPH BLD: ABNORMAL
WBC # BLD AUTO: 12 10E3/UL (ref 4–11)

## 2024-08-06 PROCEDURE — 96402 CHEMO HORMON ANTINEOPL SQ/IM: CPT

## 2024-08-06 PROCEDURE — 36415 COLL VENOUS BLD VENIPUNCTURE: CPT | Performed by: NURSE PRACTITIONER

## 2024-08-06 PROCEDURE — 250N000011 HC RX IP 250 OP 636: Mod: JZ | Performed by: NURSE PRACTITIONER

## 2024-08-06 PROCEDURE — 85007 BL SMEAR W/DIFF WBC COUNT: CPT | Mod: ZL | Performed by: NURSE PRACTITIONER

## 2024-08-06 PROCEDURE — 85027 COMPLETE CBC AUTOMATED: CPT | Mod: ZL | Performed by: NURSE PRACTITIONER

## 2024-08-06 RX ORDER — DIPHENHYDRAMINE HCL 50 MG
50 CAPSULE ORAL ONCE
Status: COMPLETED | OUTPATIENT
Start: 2024-08-06 | End: 2024-08-06

## 2024-08-06 RX ORDER — DEXAMETHASONE 4 MG/1
20 TABLET ORAL ONCE
Status: COMPLETED | OUTPATIENT
Start: 2024-08-06 | End: 2024-08-06

## 2024-08-06 RX ORDER — MONTELUKAST SODIUM 10 MG/1
10 TABLET ORAL ONCE
Status: COMPLETED | OUTPATIENT
Start: 2024-08-06 | End: 2024-08-06

## 2024-08-06 RX ORDER — ACETAMINOPHEN 325 MG/1
650 TABLET ORAL ONCE
Status: COMPLETED | OUTPATIENT
Start: 2024-08-06 | End: 2024-08-06

## 2024-08-06 RX ADMIN — DARATUMUMAB AND HYALURONIDASE-FIHJ (HUMAN RECOMBINANT) 1800 MG: 1800; 30000 INJECTION SUBCUTANEOUS at 14:23

## 2024-08-06 RX ADMIN — ACETAMINOPHEN 650 MG: 325 TABLET ORAL at 13:43

## 2024-08-06 RX ADMIN — Medication 50 MG: at 13:43

## 2024-08-06 RX ADMIN — DEXAMETHASONE 20 MG: 4 TABLET ORAL at 13:42

## 2024-08-06 RX ADMIN — MONTELUKAST SODIUM 10 MG: 10 TABLET ORAL at 13:43

## 2024-08-06 NOTE — PROGRESS NOTES
Infusion Nursing Note:  Carmelita Watts presents today for Darzalex Faspro.    Patient seen by provider today: No   present during visit today: Not Applicable.    Note: Today's labs reviewed with DILMA Gonzalez, OK to proceed with treatment today.  VORB instruct patient to report any s/sx of infection.    Intravenous Access:  Labs drawn without difficulty by Michelle PAGE.    Treatment Conditions:  Results reviewed, labs MET treatment parameters, ok to proceed with treatment.    Post Infusion Assessment:  Patient tolerated injection without incident.     The following medication was given:     MEDICATION: Darzalex Faspro  ROUTE: SQ  SITE: LUQ - Abd  DOSE: 1800mg    Discharge Plan:   [facility]  Patient will return as scheduled for next appointment.   Patient discharged in stable condition accompanied by: self.  Departure Mode: Ambulatory.

## 2024-08-07 NOTE — PROGRESS NOTES
"Oncology Follow-up Visit    Reason for Visit:  Carmelita is a 73 year old woman with a diagnosis of multiple myeloma, who I am visiting with today for routine follow-up and consideration of ongoing therapy.     Nursing Note and documentation reviewed: Yes    Interval History:   Doing well. No new concerns. No signs of infection. No fever, chills, chest pain, cough, or SOB. No bleeding concerns. No nausea or vomiting. Eating less junk and working on weight loss. Has lost about 5-6 lbs in the last 6 weeks. No bowel concerns. Occasional what she describes as a hive since she started Pomalyst. These are sporadic and do not affect her, no pruritus. These go away over time. \"Wouldn't know I have if I didn't feel them.\" Energy decent. Staying active and has lower key days if/when needed.     Oncologic History  12/31/2018 Presented to the emergency room with vertigo and fatigue.  CT scan of the head was negative and subsequent stress test was negative.  05/03/2019 PCP ordered lab work and noted a total protein of 12.9.  SPEP at that time showed an M spike of 6.2 with a large monoclonal protein seen in the gamma fraction. Urine immunofixation showed a possible small protein band in the gamma fraction  05/31/2019 Evaluated by Dr. Walker with Medical Oncology with plan to rule out myeloma and obtain bone marrow aspiration biopsy as well as a metastatic bone survey along with additional labwork  06/18/2019 Underwent bone marrow aspiration and biopsy  06/24/2019 Seen again by Dr. Walker and CBC showed a hemoglobin of 9.3, M spike 7.3 with monoclonal IgG immunoglobulin of kappa light chain type; serum viscosity was 2.9; quantitative immunoglobulins showed an IgG of 8160, beta-2 microglobulin was 5.8, BUN was 21 with creatinine is 0.8 and total protein was 13.7.  Quantitative kappa/lambda free light chains showed an elevated ratio of 17.0 bone marrow aspiration biopsy showed plasma cell myeloma with approximately 80% plasma " cells.  Immunofixation showed IgG kappa and flow cytometry revealed kappa monotypic plasma cells consistent with clonal plasma cell neoplasm and FISH panel was pending at that time. It was felt she had at least stage II disease based on her beta-2 microglobulin and anemia. Plan was to treat with Velcade 1.3 mg per/m2 days 1, 4, 8 and 11/Decadron 40 mg on days 1, 8 and 15. Initiation of Revlimid with C2 at 25 mg daily days 1 through 14.  Plan was to also obtain an MRI of the lumbar spine to rule out lytic lesion at L3.  She was initiated on Zovirax 400 mg p.o. twice daily.  06/25/2019 C1 of chemotherapy initiated  07/01/2019 Note in chart regarding patient's large co-pay for the Revlimid and no plan at this point to initiate Revlimid and treat only with Velcade and Decadron per Dr. Walker  07/11/2019 MRI lumbar spine shows a pathologic superior endplate compression fracture at L3 without evidence of retropulsed fragment and innumerable enhancing lesions throughout the lumbar spine consistent with history of multiple myeloma.  09/10/2019 Increasing M-spike and kappa lambda ratio  10/01/2019 Initiation of CyBorD  11/17/2020 Plateau of M-spike at 1.2 after 36 cycles of CyBorD  12/01/2020 Initiation of Darzalex Faspro injection  03/23/2021 M-spike increased form 1.0 to 1.2 and velcade and dexamethasone added to plan  04/12/2022 Treatment HOLIDAY commenced with Mspike 0.3 after 22 cycles of treatent  05/27/2022 Received Evusheld while on treatment holiday.   07/18/2023 Mspike 0.8. Other labs stable. PET scan ordered.   08/02/2023 PET completed showing negative study. No evidence of mass or lymphadenopathy. No concerning hypermetabolism is present.  08/14/2023 Met with Dr. Walker. Given biochemical progression, he did feel appropriate to resume therapy again with Darzalex faspro  03/12/2023 Following C7, mspike magy from 0.2-->0.8. PET ordered which was negative. Again increased frequency of Darzalex with anticipation  "of adding in Pomalyst with C2.    04/29/2024 Addition of Pomalyst with C2    Current Chemo Regime/TX: Darzalex faspro injection 1800mg subcutaneous per protocol and Pomalyst 1 mg days 1-21 of 28 day cycle given with weekly Dex  Current Cycle: C6   Completed cycles: 5  **Pom added C2 (4/29)     Previous treatment:     Velcade 1.3 mg/m2 days 1, 4, 8 and 11 with Decadron 40 mg days 1, 8 and 15 x 4 cycles;     Velcade 1.5mg.m2, cyclophosphamide 150mg every 7 days on days 1,8,15 and 22 with decadron 40mg days 1,8,15,22  Darzalex faspro injection 1800mg subcutaneous per protocol    Past Medical History:   Diagnosis Date    Arthritis     Cyst of breast     Depressive disorder     Diabetes mellitus, type 2 (H) 01/18/2021    Essential hypertension 10/01/2015    Major depressive disorder, recurrent episode, mild (H24) 10/01/2015    Mixed hyperlipidemia 10/01/2015    Multiple myeloma not having achieved remission (H) 06/24/2019    Other specified disorders of bladder 07/09/2012    Irritable Bladder    Seasonal allergies 10/01/2015    Unspecified essential hypertension 03/19/2007    Unspecified sinusitis (chronic) 09/05/2007       Past Surgical History:   Procedure Laterality Date    APPENDECTOMY      BONE MARROW BIOPSY, BONE SPECIMEN, NEEDLE/TROCAR N/A 06/18/2019    Procedure: BONE MARROW BIOPSY;  Surgeon: Maciej Sanz MD;  Location: [facility] OR    CHOLECYSTECTOMY      COLONOSCOPY  07/18/2006    repeat 10 years    COLONOSCOPY N/A 12/30/2016    Procedure: COLONOSCOPY;  Surgeon: Bhaskar Franklin DO;  Location: [facility] OR    PUNC/ASPIR BREAST CYST Left 1995    benign    SINUS SURGERY      TUBAL STERILIZATION         Family History   Problem Relation Age of Onset    Breast Cancer Mother 60        Cause of Death; 1 breast    Parkinsonism Father         \"Possible\"    Coronary Artery Disease Father     Pacemaker Father     Thyroid Disease Daughter     Breast Cancer Maternal Aunt         over 50    Diabetes No family hx of     " Hypertension No family hx of     Hyperlipidemia No family hx of     Cerebrovascular Disease No family hx of     Colon Cancer No family hx of     Prostate Cancer No family hx of     Genetic Disorder No family hx of     Asthma No family hx of     Anesthesia Reaction No family hx of        Social History     Socioeconomic History    Marital status:      Spouse name: Not on file    Number of children: Not on file    Years of education: Not on file    Highest education level: Not on file   Occupational History    Occupation: Financial     Comment:  - (FT)   Tobacco Use    Smoking status: Never    Smokeless tobacco: Never    Tobacco comments:     Tried to Quit (YES); QUIT in 1971; Passive Exposure (NO)   Vaping Use    Vaping status: Never Used   Substance and Sexual Activity    Alcohol use: Yes     Comment: RARELY    Drug use: No    Sexual activity: Yes     Partners: Male     Birth control/protection: None   Other Topics Concern     Service Not Asked    Blood Transfusions Not Asked    Caffeine Concern Yes     Comment: Coffee >6 cups daily    Occupational Exposure Not Asked    Hobby Hazards Not Asked    Sleep Concern Not Asked    Stress Concern Not Asked    Weight Concern Not Asked    Special Diet Not Asked    Back Care Not Asked    Exercise Not Asked    Bike Helmet Not Asked    Seat Belt Not Asked    Self-Exams Not Asked    Parent/sibling w/ CABG, MI or angioplasty before 65F 55M? No   Social History Narrative    Not on file     Social Determinants of Health     Financial Resource Strain: Low Risk  (11/27/2023)    Financial Resource Strain     Within the past 12 months, have you or your family members you live with been unable to get utilities (heat, electricity) when it was really needed?: No   Food Insecurity: Low Risk  (11/27/2023)    Food Insecurity     Within the past 12 months, did you worry that your food would run out before you got money to buy more?: No     Within the past 12  months, did the food you bought just not last and you didn t have money to get more?: No   Transportation Needs: Low Risk  (11/27/2023)    Transportation Needs     Within the past 12 months, has lack of transportation kept you from medical appointments, getting your medicines, non-medical meetings or appointments, work, or from getting things that you need?: No   Physical Activity: Not on file   Stress: Not on file   Social Connections: Not on file   Interpersonal Safety: Low Risk  (11/27/2023)    Interpersonal Safety     Do you feel physically and emotionally safe where you currently live?: Yes     Within the past 12 months, have you been hit, slapped, kicked or otherwise physically hurt by someone?: No     Within the past 12 months, have you been humiliated or emotionally abused in other ways by your partner or ex-partner?: No   Housing Stability: Low Risk  (11/27/2023)    Housing Stability     Do you have housing? : Yes     Are you worried about losing your housing?: No       Current Outpatient Medications   Medication Sig Dispense Refill    acyclovir (ZOVIRAX) 400 MG tablet Take 1 tablet (400 mg) by mouth 2 times daily Start 1 week prior to daratumumab and continue for 3 months after daratumumab treatment complete. 60 tablet 11    aspirin (ASA) 81 MG chewable tablet Take 81 mg by mouth daily      atorvastatin (LIPITOR) 20 MG tablet Take 1 tablet (20 mg) by mouth daily 90 tablet 3    cetirizine (ZYRTEC) 10 MG tablet Take 1 tablet (10 mg) by mouth daily 30 tablet 1    dexAMETHasone (DECADRON) 4 MG tablet Take 5 tablets (20 mg) by mouth on Days 8 and 22. 10 tablet 0    dexAMETHasone (DECADRON) 4 MG tablet Take Dex 20 mg (5 tablets) weekly 10 tablet 0    diphenhydrAMINE (BENADRYL) 25 MG capsule Take 25 mg by mouth daily      fluticasone (FLONASE) 50 MCG/ACT nasal spray SPRAY 2 SPRAYS INTO BOTH NOSTRILS DAILY 48 mL 0    hydrocortisone (CORTAID) 1 % external cream Apply topically 2 times daily sparingly over rash to  "face and chest 45 g 0    L-Lysine HCl 500 MG CAPS Take by mouth daily      losartan (COZAAR) 50 MG tablet TAKE 1 TABLET BY MOUTH DAILY 90 tablet 3    pomalidomide (POMALYST) 1 MG capsule Take 1 capsule (1 mg) by mouth daily for 21 days Day 1 thru 21 of each 28 day cycle. Swallow whole with water. 21 capsule 0    prochlorperazine (COMPAZINE) 10 MG tablet Take 1 tablet (10 mg) by mouth every 6 hours as needed for nausea or vomiting 30 tablet 2    sertraline (ZOLOFT) 50 MG tablet TAKE 1/2 TABLET BY MOUTH DAILY 30 tablet 4     No current facility-administered medications for this visit.     Facility-Administered Medications Ordered in Other Visits   Medication Dose Route Frequency Provider Last Rate Last Admin    daratumumab-hyaluronidase-fihj (DARZALEX FASPRO) SUBCUTANEOUS injection 1,800 mg  1,800 mg Subcutaneous Once Nida Gonzalez APRN CNP            Allergies   Allergen Reactions    Lisinopril Cough    Phenylephrine Hcl Other (See Comments)     **Entex  HEADACHE (SEVERE)    Phenylpropanolamine Other (See Comments)     **Entex  HEADACHE (SEVERE)    Pseudoephedrine Tannate Other (See Comments)     **Entex  HEADACHE (SEVERE)    Levofloxacin Rash     **Levaquin    Moxifloxacin Hcl [Moxifloxacin Hcl In Nacl] Rash       Review Of Systems:  A complete review of systems is negative except for the above mentioned items in the interval history.     ECOG PS: 0    Physical Exam:  /60 (BP Location: Right arm, Patient Position: Sitting, Cuff Size: Adult Regular)   Pulse 88   Temp 99.4  F (37.4  C) (Tympanic)   Ht 1.6 m (5' 3\")   Wt 78.3 kg (172 lb 9.9 oz)   SpO2 95%   BMI 30.58 kg/m    GENERAL APPEARANCE: Alert and in no acute distress.  HEENT: Eyes appear normal without scleral icterus. Extraocular movements intact.   NECK:   Supple with normal range of motion. No asymmetry or masses. She does have some bilateral fullness without obvious mass.   LYMPHATICS: No palpable cervical, supraclavicular " nodes.  RESP: Lungs clear to auscultation bilaterally, respirations regular and easy.  CARDIOVASCULAR: Regular rate and rhythm. Normal S1, S2; no murmur, gallop, or rub.  ABDOMEN: Soft, non-tender, non-distended. No palpable organomegaly or masses.  MUSCULOSKELETAL: Extremities without gross deformities noted. No edema of bilateral lower extremities.  SKIN: No suspicious lesions or rashes.  NEURO: Alert and oriented x 3.  Gait steady.  PSYCHIATRIC: Mentation and affect appear normal.  Mood appropriate.    Laboratory:  Results for orders placed or performed in visit on 08/20/24   Comprehensive metabolic panel     Status: Abnormal   Result Value Ref Range    Sodium 137 135 - 145 mmol/L    Potassium 3.9 3.4 - 5.3 mmol/L    Carbon Dioxide (CO2) 24 22 - 29 mmol/L    Anion Gap 13 7 - 15 mmol/L    Urea Nitrogen 19.6 8.0 - 23.0 mg/dL    Creatinine 0.87 0.51 - 0.95 mg/dL    GFR Estimate 69 >60 mL/min/1.73m2    Calcium 9.5 8.8 - 10.4 mg/dL    Chloride 100 98 - 107 mmol/L    Glucose 105 (H) 70 - 99 mg/dL    Alkaline Phosphatase 140 40 - 150 U/L    AST 17 0 - 45 U/L    ALT 12 0 - 50 U/L    Protein Total 6.6 6.4 - 8.3 g/dL    Albumin 4.1 3.5 - 5.2 g/dL    Bilirubin Total 0.5 <=1.2 mg/dL   CBC with platelets and differential     Status: None   Result Value Ref Range    WBC Count 6.8 4.0 - 11.0 10e3/uL    RBC Count 4.25 3.80 - 5.20 10e6/uL    Hemoglobin 13.1 11.7 - 15.7 g/dL    Hematocrit 39.0 35.0 - 47.0 %    MCV 92 78 - 100 fL    MCH 30.8 26.5 - 33.0 pg    MCHC 33.6 31.5 - 36.5 g/dL    RDW 15.0 10.0 - 15.0 %    Platelet Count 231 150 - 450 10e3/uL    % Neutrophils 41 %    % Lymphocytes 33 %    % Monocytes 18 %    % Eosinophils 6 %    % Basophils 2 %    % Immature Granulocytes 1 %    NRBCs per 100 WBC 0 <1 /100    Absolute Neutrophils 2.8 1.6 - 8.3 10e3/uL    Absolute Lymphocytes 2.2 0.8 - 5.3 10e3/uL    Absolute Monocytes 1.2 0.0 - 1.3 10e3/uL    Absolute Eosinophils 0.4 0.0 - 0.7 10e3/uL    Absolute Basophils 0.1 0.0 - 0.2  10e3/uL    Absolute Immature Granulocytes 0.1 <=0.4 10e3/uL    Absolute NRBCs 0.0 10e3/uL   CBC with Platelets & Differential     Status: None    Narrative    The following orders were created for panel order CBC with Platelets & Differential.  Procedure                               Abnormality         Status                     ---------                               -----------         ------                     CBC with platelets and d...[[id number]]                      Final result                 Please view results for these tests on the individual orders.     Component      Latest Ref Rn 8/13/2024  12:28 PM   Albumin Fraction      3.7 - 5.1 g/dL 3.8    Alpha 1 Fraction      0.2 - 0.4 g/dL 0.3    Alpha 2 Fraction      0.5 - 0.9 g/dL 0.8    Beta Fraction      0.6 - 1.0 g/dL 0.6    Gamma Fraction      0.7 - 1.6 g/dL 0.5 (L)    Monoclonal Peak      <=0.0 g/dL 0.1 (H)    ELP Interpretation: Small monoclonal protein (0.1 g/dL) seen in the gamma fraction. See immunofixation report on same specimen. Hypogammaglobulinemia. Pathologic significance requires clinical correlation. Clair Lyons M.D., Ph.D.    IGG      610 - 1,616 mg/dL 450 (L)    IGA      84 - 499 mg/dL 23 (L)    IGM      35 - 242 mg/dL 28 (L)    Arboles Free Lt Chain      0.33 - 1.94 mg/dL 0.49    Lambda Free Lt Chain      0.57 - 2.63 mg/dL 0.33 (L)    Kappa Lambda Ratio      0.26 - 1.65  1.48    Beta-2-Microglobulin      <2.3 mg/L 1.6    Viscosity Index      1.4 - 1.8  1.5    Immunofixation ELP Two monoclonal IgG immunoglobulins of kappa light chain type. Monoclonal antibody therapeutics (e.g. Daratumumab) may appear as monoclonal proteins on serum electrophoresis and immunofixation. Results should be interpreted with caution. Pathologic significance requires clinical correlation.  Clair Lyons M.D., Ph.D.    Total Protein Serum for ELP      6.4 - 8.3 g/dL 6.1 (L)       Legend:  (L) Low  (H) High    Imaging Studies:  None this visit.      ASSESSMENT/PLAN:  #1 Multiple myeloma IgG kappa multiple myeloma with 80% involvement of the bone marrow diagnosed June 2019 initially treated with Velcade and Decadron and received 4 cycles with increasing M-spike and kappa/lambda ratio.  Treatment changed to CyBorD on 10/1/2019.  M-spike plateau at 1.2 and treatment changed to monotherapy with Darzalex Faspro injection. M spike declined to 1.0 then increased again to 1.2 so Velcade and Dex added to her treatment plan with cycle 5 therapy. Following C22 (April 2022), she did initiate a treatment holiday when her Mspike was 0.3.     We had followed her counts for over a year. Unfortunately, more recently, she did see a rise in her mspike to 0.8. PET completed was negative. Dr. Walker felt this was a biochemical progression and after discussion with patient, elected to resume treatment with Darzalex faspro. Following 8 cycles, mspike began to rise. PET was negative but feeling like she wasn't responding to Darzalex alone, decided to add in Pomalyst 1 mg p.o. daily on days 1 through 21 to begin with cycle 2 in addition to dexamethasone 20 mg p.o. on days 1 ,8,15,22.    Today, she returns for C6 Darzalex with Pomalyst along with weekly Dexamethasone. Her mspike remains stable at 0.1. She is tolerating therapy with modest side effects. I do feel she may be developing some fullness in her neck, likely related to steroids. Patient does express some concern over cost of Pomalyst. Discussed various non-profit organizations as well as social work but patient wants to hold on taking to anyone. No contraindications to proceeding with treatment today, C6D1. Will return in 2 weeks for C6D15 Darzalex. I will see her back in 4 weeks prior to C7.      #2 Lytic lesions Hx lytic lesion at L3. Zometa q3m due Oct 2024. She remains on calcium with vitamin D twice a day. Weight bearing activity.    #3 Rash Scattered singular lesions since starting Pom. These self resolve over time  and are asymptomatic.         Patient in agreement with plan and verbalizes understanding. Agrees to call with any questions or concerns.      33 minutes spent in the patient's encounter today with time spent in review of patient's chart along with chart preparation and review of the treatment plan and signing of treatment plan.  Time was also spent with the patient in obtaining a review of systems and performing a physical exam along with detailed review of all test results. Time was also spent in discussing plan for future follow-up and relating instructions for follow-up and in placing future orders.    The longitudinal plan of care for the diagnosis(es)/condition(s) as documented were addressed during this visit. Due to the added complexity in care, I will continue to support Carmelita in the subsequent management and with ongoing continuity of care.        FLO Duque, FNP-C

## 2024-08-12 DIAGNOSIS — C90.00 MULTIPLE MYELOMA NOT HAVING ACHIEVED REMISSION (H): Primary | ICD-10-CM

## 2024-08-12 RX ORDER — DEXAMETHASONE 4 MG/1
TABLET ORAL
Qty: 10 TABLET | Refills: 0 | Status: SHIPPED | OUTPATIENT
Start: 2024-08-20 | End: 2024-09-17

## 2024-08-13 ENCOUNTER — TELEPHONE (OUTPATIENT)
Dept: ONCOLOGY | Facility: CLINIC | Age: 74
End: 2024-08-13
Payer: COMMERCIAL

## 2024-08-13 ENCOUNTER — LAB (OUTPATIENT)
Dept: LAB | Facility: OTHER | Age: 74
End: 2024-08-13
Payer: MEDICARE

## 2024-08-13 DIAGNOSIS — C90.00 MULTIPLE MYELOMA NOT HAVING ACHIEVED REMISSION (H): ICD-10-CM

## 2024-08-13 LAB
HOLD SPECIMEN: NORMAL
HOLD SPECIMEN: NORMAL
TOTAL PROTEIN SERUM FOR ELP: 6.1 G/DL (ref 6.4–8.3)

## 2024-08-13 PROCEDURE — 84165 PROTEIN E-PHORESIS SERUM: CPT | Mod: ZL | Performed by: STUDENT IN AN ORGANIZED HEALTH CARE EDUCATION/TRAINING PROGRAM

## 2024-08-13 PROCEDURE — 36415 COLL VENOUS BLD VENIPUNCTURE: CPT | Mod: ZL

## 2024-08-13 PROCEDURE — 82232 ASSAY OF BETA-2 PROTEIN: CPT | Mod: ZL

## 2024-08-13 PROCEDURE — 85810 BLOOD VISCOSITY EXAMINATION: CPT | Mod: ZL

## 2024-08-13 PROCEDURE — 86334 IMMUNOFIX E-PHORESIS SERUM: CPT | Mod: 26 | Performed by: PATHOLOGY

## 2024-08-13 PROCEDURE — 84165 PROTEIN E-PHORESIS SERUM: CPT | Mod: 26 | Performed by: PATHOLOGY

## 2024-08-13 PROCEDURE — 82784 ASSAY IGA/IGD/IGG/IGM EACH: CPT | Mod: ZL

## 2024-08-13 PROCEDURE — 84155 ASSAY OF PROTEIN SERUM: CPT | Mod: ZL

## 2024-08-13 PROCEDURE — 86334 IMMUNOFIX E-PHORESIS SERUM: CPT | Mod: ZL | Performed by: STUDENT IN AN ORGANIZED HEALTH CARE EDUCATION/TRAINING PROGRAM

## 2024-08-13 PROCEDURE — 83521 IG LIGHT CHAINS FREE EACH: CPT | Mod: ZL

## 2024-08-13 NOTE — TELEPHONE ENCOUNTER
Oral Chemotherapy Monitoring Program     Medication: Pomalyst   Rx: 1mg PO daily on days 1 through 21 of 28 day cycle   REMS Auth [id number]  Routine survey questions reviewed.   Rx to be Escribed to SP

## 2024-08-15 LAB
ALBUMIN SERPL ELPH-MCNC: 3.8 G/DL (ref 3.7–5.1)
ALPHA1 GLOB SERPL ELPH-MCNC: 0.3 G/DL (ref 0.2–0.4)
ALPHA2 GLOB SERPL ELPH-MCNC: 0.8 G/DL (ref 0.5–0.9)
B-GLOBULIN SERPL ELPH-MCNC: 0.6 G/DL (ref 0.6–1)
B2 MICROGLOB TUMOR MARKER SER-MCNC: 1.6 MG/L
GAMMA GLOB SERPL ELPH-MCNC: 0.5 G/DL (ref 0.7–1.6)
M PROTEIN SERPL ELPH-MCNC: 0.1 G/DL
PROT PATTERN SERPL ELPH-IMP: ABNORMAL
PROT PATTERN SERPL IFE-IMP: NORMAL
VISC SER: 1.5 CP (ref 1.4–1.8)

## 2024-08-16 LAB
IGA SERPL-MCNC: 23 MG/DL (ref 84–499)
IGG SERPL-MCNC: 450 MG/DL (ref 610–1616)
IGM SERPL-MCNC: 28 MG/DL (ref 35–242)
KAPPA LC FREE SER-MCNC: 0.49 MG/DL (ref 0.33–1.94)
KAPPA LC FREE/LAMBDA FREE SER NEPH: 1.48 {RATIO} (ref 0.26–1.65)
LAMBDA LC FREE SERPL-MCNC: 0.33 MG/DL (ref 0.57–2.63)

## 2024-08-20 ENCOUNTER — INFUSION THERAPY VISIT (OUTPATIENT)
Dept: INFUSION THERAPY | Facility: OTHER | Age: 74
End: 2024-08-20
Attending: INTERNAL MEDICINE
Payer: MEDICARE

## 2024-08-20 ENCOUNTER — ONCOLOGY VISIT (OUTPATIENT)
Dept: ONCOLOGY | Facility: OTHER | Age: 74
End: 2024-08-20
Attending: NURSE PRACTITIONER
Payer: MEDICARE

## 2024-08-20 ENCOUNTER — LAB (OUTPATIENT)
Dept: LAB | Facility: OTHER | Age: 74
End: 2024-08-20
Attending: NURSE PRACTITIONER
Payer: MEDICARE

## 2024-08-20 VITALS
WEIGHT: 172.62 LBS | DIASTOLIC BLOOD PRESSURE: 60 MMHG | TEMPERATURE: 99.4 F | BODY MASS INDEX: 30.59 KG/M2 | SYSTOLIC BLOOD PRESSURE: 122 MMHG | HEART RATE: 88 BPM | HEIGHT: 63 IN | OXYGEN SATURATION: 95 %

## 2024-08-20 DIAGNOSIS — C90.00 MULTIPLE MYELOMA NOT HAVING ACHIEVED REMISSION (H): Primary | ICD-10-CM

## 2024-08-20 DIAGNOSIS — C90.00 MULTIPLE MYELOMA NOT HAVING ACHIEVED REMISSION (H): ICD-10-CM

## 2024-08-20 DIAGNOSIS — R21 RASH: ICD-10-CM

## 2024-08-20 DIAGNOSIS — M89.9 BONE LESION: ICD-10-CM

## 2024-08-20 LAB
ALBUMIN SERPL BCG-MCNC: 4.1 G/DL (ref 3.5–5.2)
ALP SERPL-CCNC: 140 U/L (ref 40–150)
ALT SERPL W P-5'-P-CCNC: 12 U/L (ref 0–50)
ANION GAP SERPL CALCULATED.3IONS-SCNC: 13 MMOL/L (ref 7–15)
AST SERPL W P-5'-P-CCNC: 17 U/L (ref 0–45)
BASOPHILS # BLD AUTO: 0.1 10E3/UL (ref 0–0.2)
BASOPHILS NFR BLD AUTO: 2 %
BILIRUB SERPL-MCNC: 0.5 MG/DL
BUN SERPL-MCNC: 19.6 MG/DL (ref 8–23)
CALCIUM SERPL-MCNC: 9.5 MG/DL (ref 8.8–10.4)
CHLORIDE SERPL-SCNC: 100 MMOL/L (ref 98–107)
CREAT SERPL-MCNC: 0.87 MG/DL (ref 0.51–0.95)
EGFRCR SERPLBLD CKD-EPI 2021: 69 ML/MIN/1.73M2
EOSINOPHIL # BLD AUTO: 0.4 10E3/UL (ref 0–0.7)
EOSINOPHIL NFR BLD AUTO: 6 %
ERYTHROCYTE [DISTWIDTH] IN BLOOD BY AUTOMATED COUNT: 15 % (ref 10–15)
GLUCOSE SERPL-MCNC: 105 MG/DL (ref 70–99)
HCO3 SERPL-SCNC: 24 MMOL/L (ref 22–29)
HCT VFR BLD AUTO: 39 % (ref 35–47)
HGB BLD-MCNC: 13.1 G/DL (ref 11.7–15.7)
IMM GRANULOCYTES # BLD: 0.1 10E3/UL
IMM GRANULOCYTES NFR BLD: 1 %
LYMPHOCYTES # BLD AUTO: 2.2 10E3/UL (ref 0.8–5.3)
LYMPHOCYTES NFR BLD AUTO: 33 %
MCH RBC QN AUTO: 30.8 PG (ref 26.5–33)
MCHC RBC AUTO-ENTMCNC: 33.6 G/DL (ref 31.5–36.5)
MCV RBC AUTO: 92 FL (ref 78–100)
MONOCYTES # BLD AUTO: 1.2 10E3/UL (ref 0–1.3)
MONOCYTES NFR BLD AUTO: 18 %
NEUTROPHILS # BLD AUTO: 2.8 10E3/UL (ref 1.6–8.3)
NEUTROPHILS NFR BLD AUTO: 41 %
NRBC # BLD AUTO: 0 10E3/UL
NRBC BLD AUTO-RTO: 0 /100
PLATELET # BLD AUTO: 231 10E3/UL (ref 150–450)
POTASSIUM SERPL-SCNC: 3.9 MMOL/L (ref 3.4–5.3)
PROT SERPL-MCNC: 6.6 G/DL (ref 6.4–8.3)
RBC # BLD AUTO: 4.25 10E6/UL (ref 3.8–5.2)
SODIUM SERPL-SCNC: 137 MMOL/L (ref 135–145)
WBC # BLD AUTO: 6.8 10E3/UL (ref 4–11)

## 2024-08-20 PROCEDURE — 80053 COMPREHEN METABOLIC PANEL: CPT | Mod: ZL

## 2024-08-20 PROCEDURE — 85041 AUTOMATED RBC COUNT: CPT | Mod: ZL

## 2024-08-20 PROCEDURE — 250N000011 HC RX IP 250 OP 636: Mod: JZ | Performed by: NURSE PRACTITIONER

## 2024-08-20 PROCEDURE — 96401 CHEMO ANTI-NEOPL SQ/IM: CPT

## 2024-08-20 PROCEDURE — G0463 HOSPITAL OUTPT CLINIC VISIT: HCPCS

## 2024-08-20 PROCEDURE — 36415 COLL VENOUS BLD VENIPUNCTURE: CPT | Mod: ZL

## 2024-08-20 PROCEDURE — 99214 OFFICE O/P EST MOD 30 MIN: CPT | Performed by: NURSE PRACTITIONER

## 2024-08-20 PROCEDURE — G2211 COMPLEX E/M VISIT ADD ON: HCPCS | Performed by: NURSE PRACTITIONER

## 2024-08-20 PROCEDURE — G0463 HOSPITAL OUTPT CLINIC VISIT: HCPCS | Mod: 25

## 2024-08-20 RX ORDER — ACETAMINOPHEN 325 MG/1
650 TABLET ORAL
Status: CANCELLED | OUTPATIENT
Start: 2024-09-03

## 2024-08-20 RX ORDER — MONTELUKAST SODIUM 10 MG/1
10 TABLET ORAL ONCE
Status: COMPLETED | OUTPATIENT
Start: 2024-08-20 | End: 2024-08-20

## 2024-08-20 RX ORDER — ACETAMINOPHEN 325 MG/1
650 TABLET ORAL
Status: CANCELLED | OUTPATIENT
Start: 2024-08-20

## 2024-08-20 RX ORDER — ALBUTEROL SULFATE 0.83 MG/ML
2.5 SOLUTION RESPIRATORY (INHALATION)
Status: CANCELLED | OUTPATIENT
Start: 2024-09-03

## 2024-08-20 RX ORDER — ACETAMINOPHEN 325 MG/1
650 TABLET ORAL ONCE
Status: COMPLETED | OUTPATIENT
Start: 2024-08-20 | End: 2024-08-20

## 2024-08-20 RX ORDER — DIPHENHYDRAMINE HYDROCHLORIDE 50 MG/ML
50 INJECTION INTRAMUSCULAR; INTRAVENOUS
Status: CANCELLED
Start: 2024-08-20

## 2024-08-20 RX ORDER — METHYLPREDNISOLONE SODIUM SUCCINATE 125 MG/2ML
125 INJECTION, POWDER, LYOPHILIZED, FOR SOLUTION INTRAMUSCULAR; INTRAVENOUS
Status: CANCELLED
Start: 2024-08-20

## 2024-08-20 RX ORDER — MONTELUKAST SODIUM 10 MG/1
10 TABLET ORAL ONCE
Status: CANCELLED | OUTPATIENT
Start: 2024-09-03

## 2024-08-20 RX ORDER — DIPHENHYDRAMINE HCL 50 MG
50 CAPSULE ORAL
Status: CANCELLED | OUTPATIENT
Start: 2024-09-03

## 2024-08-20 RX ORDER — MEPERIDINE HYDROCHLORIDE 25 MG/ML
25 INJECTION INTRAMUSCULAR; INTRAVENOUS; SUBCUTANEOUS EVERY 30 MIN PRN
Status: CANCELLED | OUTPATIENT
Start: 2024-09-03

## 2024-08-20 RX ORDER — DIPHENHYDRAMINE HCL 50 MG
50 CAPSULE ORAL ONCE
Status: CANCELLED | OUTPATIENT
Start: 2024-08-20

## 2024-08-20 RX ORDER — MEPERIDINE HYDROCHLORIDE 25 MG/ML
25 INJECTION INTRAMUSCULAR; INTRAVENOUS; SUBCUTANEOUS EVERY 30 MIN PRN
Status: CANCELLED | OUTPATIENT
Start: 2024-08-20

## 2024-08-20 RX ORDER — DEXAMETHASONE 4 MG/1
20 TABLET ORAL ONCE
Status: CANCELLED | OUTPATIENT
Start: 2024-09-03

## 2024-08-20 RX ORDER — EPINEPHRINE 1 MG/ML
0.3 INJECTION, SOLUTION, CONCENTRATE INTRAVENOUS EVERY 5 MIN PRN
Status: CANCELLED | OUTPATIENT
Start: 2024-08-20

## 2024-08-20 RX ORDER — ACETAMINOPHEN 325 MG/1
650 TABLET ORAL ONCE
Status: CANCELLED | OUTPATIENT
Start: 2024-08-20

## 2024-08-20 RX ORDER — LORAZEPAM 2 MG/ML
0.5 INJECTION INTRAMUSCULAR EVERY 4 HOURS PRN
Status: CANCELLED | OUTPATIENT
Start: 2024-09-03

## 2024-08-20 RX ORDER — DIPHENHYDRAMINE HCL 50 MG
50 CAPSULE ORAL ONCE
Status: COMPLETED | OUTPATIENT
Start: 2024-08-20 | End: 2024-08-20

## 2024-08-20 RX ORDER — LORAZEPAM 2 MG/ML
0.5 INJECTION INTRAMUSCULAR EVERY 4 HOURS PRN
Status: CANCELLED | OUTPATIENT
Start: 2024-08-20

## 2024-08-20 RX ORDER — ALBUTEROL SULFATE 90 UG/1
1-2 AEROSOL, METERED RESPIRATORY (INHALATION)
Status: CANCELLED
Start: 2024-08-20

## 2024-08-20 RX ORDER — DIPHENHYDRAMINE HCL 50 MG
50 CAPSULE ORAL ONCE
Status: CANCELLED | OUTPATIENT
Start: 2024-09-03

## 2024-08-20 RX ORDER — DIPHENHYDRAMINE HCL 50 MG
50 CAPSULE ORAL
Status: CANCELLED | OUTPATIENT
Start: 2024-08-20

## 2024-08-20 RX ORDER — DEXAMETHASONE 4 MG/1
20 TABLET ORAL ONCE
Status: COMPLETED | OUTPATIENT
Start: 2024-08-20 | End: 2024-08-20

## 2024-08-20 RX ORDER — DIPHENHYDRAMINE HYDROCHLORIDE 50 MG/ML
50 INJECTION INTRAMUSCULAR; INTRAVENOUS
Status: CANCELLED
Start: 2024-09-03

## 2024-08-20 RX ORDER — ALBUTEROL SULFATE 90 UG/1
1-2 AEROSOL, METERED RESPIRATORY (INHALATION)
Status: CANCELLED
Start: 2024-09-03

## 2024-08-20 RX ORDER — ALBUTEROL SULFATE 0.83 MG/ML
2.5 SOLUTION RESPIRATORY (INHALATION)
Status: CANCELLED | OUTPATIENT
Start: 2024-08-20

## 2024-08-20 RX ORDER — ACETAMINOPHEN 325 MG/1
650 TABLET ORAL ONCE
Status: CANCELLED | OUTPATIENT
Start: 2024-09-03

## 2024-08-20 RX ORDER — METHYLPREDNISOLONE SODIUM SUCCINATE 125 MG/2ML
125 INJECTION, POWDER, LYOPHILIZED, FOR SOLUTION INTRAMUSCULAR; INTRAVENOUS
Status: CANCELLED
Start: 2024-09-03

## 2024-08-20 RX ORDER — EPINEPHRINE 1 MG/ML
0.3 INJECTION, SOLUTION, CONCENTRATE INTRAVENOUS EVERY 5 MIN PRN
Status: CANCELLED | OUTPATIENT
Start: 2024-09-03

## 2024-08-20 RX ADMIN — ACETAMINOPHEN 650 MG: 325 TABLET ORAL at 12:12

## 2024-08-20 RX ADMIN — DARATUMUMAB AND HYALURONIDASE-FIHJ (HUMAN RECOMBINANT) 1800 MG: 1800; 30000 INJECTION SUBCUTANEOUS at 12:56

## 2024-08-20 RX ADMIN — DEXAMETHASONE 20 MG: 4 TABLET ORAL at 12:13

## 2024-08-20 RX ADMIN — MONTELUKAST SODIUM 10 MG: 10 TABLET ORAL at 12:12

## 2024-08-20 RX ADMIN — Medication 50 MG: at 12:12

## 2024-08-20 ASSESSMENT — PAIN SCALES - GENERAL: PAINLEVEL: NO PAIN (0)

## 2024-08-20 NOTE — PROGRESS NOTES
Infusion Nursing Note:  Carmelita Watts presents today for faspro.    Patient seen by provider today: Yes: Cynthia Gonzalez NP   present during visit today: Not Applicable.    Note: Patient offers no additional concerns or questions.        Intravenous Access:  No Intravenous access/labs at this visit.    Treatment Conditions:  Lab Results   Component Value Date    HGB 13.1 08/20/2024    WBC 6.8 08/20/2024    ANEU 8.8 (H) 08/06/2024    ANEUTAUTO 2.8 08/20/2024     08/20/2024        Lab Results   Component Value Date     08/20/2024    POTASSIUM 3.9 08/20/2024    CR 0.87 08/20/2024    PAYAL 9.5 08/20/2024    BILITOTAL 0.5 08/20/2024    ALBUMIN 4.1 08/20/2024    ALT 12 08/20/2024    AST 17 08/20/2024       Results reviewed, labs MET treatment parameters, ok to proceed with treatment.      Post Infusion Assessment:  Patient tolerated infusion without incident..       Discharge Plan:   Patient declined prescription refills.  AVS to patient via [employer].  Patient will return 14 for next appointment.   Patient discharged in stable condition accompanied by: self.  Departure Mode: Ambulatory.        Anne Lan RN

## 2024-08-20 NOTE — NURSING NOTE
"Oncology Rooming Note    August 20, 2024 11:02 AM   Carmelita Watts is a 73 year old female who presents for:    Chief Complaint   Patient presents with    Oncology Clinic Visit     Follow up. Multiple myeloma not having achieved remission      Initial Vitals: /60 (BP Location: Right arm, Patient Position: Sitting, Cuff Size: Adult Regular)   Pulse 88   Temp 99.4  F (37.4  C) (Tympanic)   Ht 1.6 m (5' 3\")   Wt 78.3 kg (172 lb 9.9 oz)   SpO2 95%   BMI 30.58 kg/m   Estimated body mass index is 30.58 kg/m  as calculated from the following:    Height as of this encounter: 1.6 m (5' 3\").    Weight as of this encounter: 78.3 kg (172 lb 9.9 oz). Body surface area is 1.87 meters squared.  No Pain (0) Comment: Data Unavailable   No LMP recorded. Patient is postmenopausal.  Allergies reviewed: Yes  Medications reviewed: Yes    Medications: Medication refills not needed today.  Pharmacy name entered into [facility]:    [id number] - [place], [place] - [address] AT [facility] [address]  [facility] - [place], [place] - [address]    Frailty Screening:   Is the patient here for a new oncology consult visit in cancer care? 2. No      Clinical concerns: none       Julia Andrade LPN              "

## 2024-08-23 RX ORDER — CETIRIZINE HYDROCHLORIDE 10 MG/1
10 TABLET ORAL DAILY
Qty: 30 TABLET | Refills: 0 | OUTPATIENT
Start: 2024-08-23

## 2024-08-23 NOTE — TELEPHONE ENCOUNTER
Zyrtec      Last Written Prescription Date:  07/23/24  Last Fill Quantity: 30,   # refills: 1  Last Office Visit: 04/16/24  Future Office visit:    Next 5 appointments (look out 90 days)      Sep 17, 2024 10:50 AM  (Arrive by 10:35 AM)  Return Visit with FLO Robertson [facility] ([facility] ) [address]  [facility] [address]  [phone]     Oct 16, 2024 8:45 AM  (Arrive by 8:30 AM)  Return Visit with Bridgette Ly PA-C  [facility] ([facility] ) [address]  [facility] [address]  [phone]

## 2024-08-24 ENCOUNTER — APPOINTMENT (OUTPATIENT)
Dept: GENERAL RADIOLOGY | Facility: HOSPITAL | Age: 74
End: 2024-08-24
Attending: PHYSICIAN ASSISTANT
Payer: MEDICARE

## 2024-08-24 ENCOUNTER — HOSPITAL ENCOUNTER (EMERGENCY)
Facility: HOSPITAL | Age: 74
Discharge: HOME | End: 2024-08-24
Attending: PHYSICIAN ASSISTANT | Admitting: PHYSICIAN ASSISTANT
Payer: MEDICARE

## 2024-08-24 VITALS
SYSTOLIC BLOOD PRESSURE: 149 MMHG | HEART RATE: 82 BPM | RESPIRATION RATE: 16 BRPM | TEMPERATURE: 99.5 F | DIASTOLIC BLOOD PRESSURE: 86 MMHG | OXYGEN SATURATION: 95 %

## 2024-08-24 DIAGNOSIS — R07.1 CHEST PAIN VARYING WITH BREATHING: ICD-10-CM

## 2024-08-24 DIAGNOSIS — C90.00 MULTIPLE MYELOMA NOT HAVING ACHIEVED REMISSION (H): ICD-10-CM

## 2024-08-24 DIAGNOSIS — R06.02 SOB (SHORTNESS OF BREATH): ICD-10-CM

## 2024-08-24 PROCEDURE — 99283 EMERGENCY DEPT VISIT LOW MDM: CPT | Mod: 25

## 2024-08-24 PROCEDURE — 99283 EMERGENCY DEPT VISIT LOW MDM: CPT | Performed by: PHYSICIAN ASSISTANT

## 2024-08-24 PROCEDURE — 71046 X-RAY EXAM CHEST 2 VIEWS: CPT

## 2024-08-24 ASSESSMENT — ACTIVITIES OF DAILY LIVING (ADL): ADLS_ACUITY_SCORE: 35

## 2024-08-24 ASSESSMENT — COLUMBIA-SUICIDE SEVERITY RATING SCALE - C-SSRS
2. HAVE YOU ACTUALLY HAD ANY THOUGHTS OF KILLING YOURSELF IN THE PAST MONTH?: NO
1. IN THE PAST MONTH, HAVE YOU WISHED YOU WERE DEAD OR WISHED YOU COULD GO TO SLEEP AND NOT WAKE UP?: NO
6. HAVE YOU EVER DONE ANYTHING, STARTED TO DO ANYTHING, OR PREPARED TO DO ANYTHING TO END YOUR LIFE?: NO

## 2024-08-24 NOTE — ED TRIAGE NOTES
Ambulated into triage with steady gait with c/o back pain and SOB and states she is wanting to make sure she does not have a collapsed lung or blood clot. In good spirits and talkative. No visible signs of respiratory distress. States she went for a one mile walk this morning which did help. States pain and SOB is much better today than it was yesterday. Rates pain 4/10 today.      Triage Assessment (Adult)       Row Name 08/24/24 0948          Triage Assessment    Airway WDL WDL        Respiratory WDL    Respiratory WDL WDL

## 2024-08-24 NOTE — ED NOTES
Pt comes in with c/o right sided chest heaviness with breathing. Pt is taking chemotherapy drug that has a high risk for blood clots and is concerned. Pt stated these symptoms started yesterday and has worsened. Pt denies chest pain, SOB.

## 2024-08-24 NOTE — DISCHARGE INSTRUCTIONS
Continue current medications.  The chest x-ray today does not rule out a PE only helps us determine that you do not have a collapsed lung, pneumonia, mass or some other possible pathology.  Patient may take Tylenol if needed for pain.  If symptoms worsen or change return back to the ER for further evaluation  Discharged in stable condition.

## 2024-08-24 NOTE — ED PROVIDER NOTES
History     Chief Complaint   Patient presents with    Back Pain     HPI  Carmelita Watts is a 73 year old female who presents to the ER with complaints that she has some pain on the right side of her lung when she takes a deep breath.  Patient states that it radiates to her back.  She felt short of breath but that resolved.  She states that she went out for a 1 mile walk this morning and that made the shortness of breath go away.  Patient has a history of myeloma.  Hyperlipidemia, hypertension, depression, anxiety,diabetes type 2,she has had history of intermittent shortness of breath and arthritis.  Patient states that she felt like there was pain only when she would take a deep breath felt heavy.  She is doing a chemo drug that is a risk of blood clots.  Patient has never had a blood clot before.  She denies any cough, wheezing, cold symptoms, headache, dizziness, she did not seem to have any pain during the night.  She slept well.  Patient states that she is concerned maybe she has a collapsed lung or pneumonia.  She has had no fever.  She has not had any ill contacts.  The pain does not seem to radiate into her back today.    Allergies:  Allergies   Allergen Reactions    Lisinopril Cough    Phenylephrine Hcl Other (See Comments)     **Entex  HEADACHE (SEVERE)    Phenylpropanolamine Other (See Comments)     **Entex  HEADACHE (SEVERE)    Pseudoephedrine Tannate Other (See Comments)     **Entex  HEADACHE (SEVERE)    Levofloxacin Rash     **Levaquin    Moxifloxacin Hcl [Moxifloxacin Hcl In Nacl] Rash       Problem List:    Patient Active Problem List    Diagnosis Date Noted    Arthritis 11/27/2023     Priority: Medium    SOB (shortness of breath) 09/20/2021     Priority: Medium    Diabetes mellitus, type 2 (H) 01/18/2021     Priority: Medium    Multiple myeloma not having achieved remission (H) 06/24/2019     Priority: Medium    ACP (advance care planning) 10/26/2016     Priority: Medium     Advance Care Planning  10/26/2016: ACP Review of Chart / Resources Provided:  Reviewed chart for advance care plan.  Carmelita Watts has no plan or code status on file. Discussed available resources and provided with information. Confirmed code status reflects current choices pending further ACP discussions.  Confirmed/documented legally designated decision makers.  Added by Alison Landeros            Major depressive disorder, recurrent episode, mild (H24) 10/01/2015     Priority: Medium    Seasonal allergies 10/01/2015     Priority: Medium    Mixed hyperlipidemia 10/01/2015     Priority: Medium    Hyperlipidemia LDL goal <130 07/05/2013     Priority: Medium    Hypertension goal BP (blood pressure) < 140/80 07/05/2013     Priority: Medium    Advanced care planning/counseling discussion 07/09/2012     Priority: Medium        Past Medical History:    Past Medical History:   Diagnosis Date    Arthritis     Cyst of breast     Depressive disorder     Diabetes mellitus, type 2 (H) 01/18/2021    Essential hypertension 10/01/2015    Major depressive disorder, recurrent episode, mild (H24) 10/01/2015    Mixed hyperlipidemia 10/01/2015    Multiple myeloma not having achieved remission (H) 06/24/2019    Other specified disorders of bladder 07/09/2012    Seasonal allergies 10/01/2015    Unspecified essential hypertension 03/19/2007    Unspecified sinusitis (chronic) 09/05/2007       Past Surgical History:    Past Surgical History:   Procedure Laterality Date    APPENDECTOMY      BONE MARROW BIOPSY, BONE SPECIMEN, NEEDLE/TROCAR N/A 06/18/2019    Procedure: BONE MARROW BIOPSY;  Surgeon: Maciej Sanz MD;  Location: [facility]    CHOLECYSTECTOMY      COLONOSCOPY  07/18/2006    repeat 10 years    COLONOSCOPY N/A 12/30/2016    Procedure: COLONOSCOPY;  Surgeon: Bhaskar Franklin DO;  Location: [facility]    PUNC/ASPIR BREAST CYST Left 1995    benign    SINUS SURGERY      TUBAL STERILIZATION         Family History:    Family History   Problem Relation Age of  "Onset    Breast Cancer Mother 60        Cause of Death; 1 breast    Parkinsonism Father         \"Possible\"    Coronary Artery Disease Father     Pacemaker Father     Thyroid Disease Daughter     Breast Cancer Maternal Aunt         over 50    Diabetes No family hx of     Hypertension No family hx of     Hyperlipidemia No family hx of     Cerebrovascular Disease No family hx of     Colon Cancer No family hx of     Prostate Cancer No family hx of     Genetic Disorder No family hx of     Asthma No family hx of     Anesthesia Reaction No family hx of        Social History:  Marital Status:   [5]  Social History     Tobacco Use    Smoking status: Never    Smokeless tobacco: Never    Tobacco comments:     Tried to Quit (YES); QUIT in 1971; Passive Exposure (NO)   Vaping Use    Vaping status: Never Used   Substance Use Topics    Alcohol use: Yes     Comment: RARELY    Drug use: No        Medications:    acyclovir (ZOVIRAX) 400 MG tablet  aspirin (ASA) 81 MG chewable tablet  atorvastatin (LIPITOR) 20 MG tablet  cetirizine (ZYRTEC) 10 MG tablet  dexAMETHasone (DECADRON) 4 MG tablet  dexAMETHasone (DECADRON) 4 MG tablet  diphenhydrAMINE (BENADRYL) 25 MG capsule  fluticasone (FLONASE) 50 MCG/ACT nasal spray  hydrocortisone (CORTAID) 1 % external cream  L-Lysine HCl 500 MG CAPS  losartan (COZAAR) 50 MG tablet  pomalidomide (POMALYST) 1 MG capsule  prochlorperazine (COMPAZINE) 10 MG tablet  sertraline (ZOLOFT) 50 MG tablet          Review of Systems   Constitutional: Negative.  Negative for fever.   HENT: Negative.     Eyes: Negative.    Respiratory: Negative.     Cardiovascular: Negative.    Gastrointestinal: Negative.    Endocrine: Negative.    Genitourinary: Negative.    Musculoskeletal: Negative.    Allergic/Immunologic: Negative.    Neurological: Negative.    Hematological: Negative.    Psychiatric/Behavioral: Negative.     All other systems reviewed and are negative.      Physical Exam   BP: 153/94  Pulse: 90  Temp: " 98.5  F (36.9  C)  Resp: 18  SpO2: 97 %      Physical Exam  Vitals and nursing note reviewed.   Constitutional:       Appearance: Normal appearance.   HENT:      Right Ear: Tympanic membrane, ear canal and external ear normal.      Left Ear: Tympanic membrane, ear canal and external ear normal.      Nose: Nose normal.      Mouth/Throat:      Mouth: Mucous membranes are moist.   Eyes:      Extraocular Movements: Extraocular movements intact.      Conjunctiva/sclera: Conjunctivae normal.      Pupils: Pupils are equal, round, and reactive to light.   Cardiovascular:      Rate and Rhythm: Normal rate and regular rhythm.      Pulses: Normal pulses.      Heart sounds: Normal heart sounds.   Pulmonary:      Effort: Pulmonary effort is normal.      Breath sounds: Normal breath sounds.      Comments: She has no tenderness with palpation to the right side of her lung both front and back.  She does not have any wheezing, rales, rhonchi.  She states that during my examination the pain is no longer there.  No other deformities abnormalities noted.  No bruising.  No signs of injury.  Chest:      Chest wall: No tenderness.   Abdominal:      General: Abdomen is flat. Bowel sounds are normal.      Palpations: Abdomen is soft.   Musculoskeletal:         General: Normal range of motion.      Cervical back: Normal range of motion and neck supple.   Skin:     General: Skin is warm.      Capillary Refill: Capillary refill takes less than 2 seconds.   Neurological:      General: No focal deficit present.      Mental Status: She is alert and oriented to person, place, and time. Mental status is at baseline.      Cranial Nerves: No cranial nerve deficit.      Motor: No weakness.      Gait: Gait normal.      Deep Tendon Reflexes: Reflexes normal.   Psychiatric:         Mood and Affect: Mood normal.         ED Course        Procedures         Narrative & Impression  Procedure:XR CHEST 2 VIEWS     Clinical history:Female, 73 years, right chest  pain with deep breath     Technique: Two views are submitted.     Comparison: 11/27/2023     Findings: The cardiac silhouette is within normal limits.. The  pulmonary vasculature is within normal limits.     The lungs again demonstrate linear scarring/atelectasis in the  lingula. Bony structures are unremarkable.                                                                      Impression:   No acute abnormality. No evidence of acute or active cardiopulmonar         No results found. However, due to the size of the patient record, not all encounters were searched. Please check Results Review for a complete set of results.    Medications - No data to display    Assessments & Plan (with Medical Decision Making)     I have reviewed the nursing notes.    I have reviewed the findings, diagnosis, plan and need for follow up with the patient.      73-year-old female presents to the ER with complaints of right pain in her chest, lung with taking a deep breath that started 1 day ago.  Patient states that the pain is better it does no longer radiate her back.  She is no longer short of breath.  Patient has a history of multiple myeloma currently on oral chemo medication no history of blood clot, pneumonia, bronchitis, cold symptoms, fever.  Patient states that after a long walk this morning the pain and shortness of breath resolved.  However she is concerned that could she have pneumonia, bronchitis, a blood clot, is the multiple myeloma in her ribs and she would like to have a chest x-ray.  Patient has not been around anybody sick.  Patient's overall physical exam is relatively negative her vitals are stable she is not hypoxic I cannot reproduce any pain in her chest and when she does take a deep breath today during her evaluation the pain is gone.  I did discuss with the patient that a chest x-ray alone is really not going to reveal possible reasons of why she is having pain.  She could be having pain because of a blood  clot, pneumonia, bronchitis, acute coronary syndrome, possibly something pathologically with the multiple myeloma that we do not see on x-ray, COVID-19, among others.  I did discuss with the patient that my recommendations would be probably for labs, EKG, chest x-ray and ultimately may be a CT scan for PE.  Patient would just like to start out with an chest x-ray at this time and see if it shows anything.  The chest x-ray was done which shows no signs of any pneumonia there is no rib abnormalities that might indicate something with multiple myeloma however I do not think this can truly rule out any complications of that no signs of bronchitis or reactive airway.  Overall the chest x-ray was read negative.  I did discuss the findings with the patient and again I did indicate that this does not rule out that she is not possibly had a PE.  The only way to rule that out would be with a CT scan of the chest angiogram.  I did indicate that we would need to draw labs and proceed with that special exam in order to rule that out and she deferred at this time patient understands the risks of doing so.  She states that she feels better and she does not think that it is a blood clot.  She states she thinks she just needs more exercise and getting outside will help.  It is sound of mind and I do not think that there is any reason that she cannot make her own medical decisions.  At this time patient will be discharged.  She does understand that if she does have a blood clot this is a life-threatening problem patient does understand and is in agreement that if something would worsen or change she would come back.  I did indicate that I would check on her tomorrow and make sure she is still doing well.  If anything worsens or changes she can come back to the ER immediately and we would be more than happy to do the CT scan labs and EKG.  Patient did understand the above assessment treatment and plan and she was discharged in stable  condition.      8/25/2024 at 2:30 PM.  I did call and speak with the patient.  She indicates that her pain is almost completely gone in the right side of her lung or chest.  It does not seem to be bothering her when she takes a deep breath.  She did go on exercise this morning without any complications.  I did discuss with her again on the phone that because she is still having some pain I would recommend she come back for a scan to rule out a PE.  At this time patient does not feel that that is necessary however she does know that she could come back if symptoms were to worsen or if she feels like she needs that exam to reassure her that this is not a PE.  I did discuss with the patient again that this could be a life-threatening issue and she understands the risks of not coming back and having that done at this time.          Discharge Medication List as of 8/24/2024  1:47 PM          Final diagnoses:   Chest pain varying with breathing       8/24/2024   [facility]       Alyce Page PA-C  08/25/24 [id number]

## 2024-08-25 ASSESSMENT — ENCOUNTER SYMPTOMS
CONSTITUTIONAL NEGATIVE: 1
GASTROINTESTINAL NEGATIVE: 1
FEVER: 0
ALLERGIC/IMMUNOLOGIC NEGATIVE: 1
PSYCHIATRIC NEGATIVE: 1
HEMATOLOGIC/LYMPHATIC NEGATIVE: 1
EYES NEGATIVE: 1
CARDIOVASCULAR NEGATIVE: 1
NEUROLOGICAL NEGATIVE: 1
ENDOCRINE NEGATIVE: 1
MUSCULOSKELETAL NEGATIVE: 1
RESPIRATORY NEGATIVE: 1

## 2024-09-03 ENCOUNTER — INFUSION THERAPY VISIT (OUTPATIENT)
Dept: INFUSION THERAPY | Facility: OTHER | Age: 74
End: 2024-09-03
Attending: INTERNAL MEDICINE
Payer: MEDICARE

## 2024-09-03 VITALS
OXYGEN SATURATION: 96 % | RESPIRATION RATE: 17 BRPM | DIASTOLIC BLOOD PRESSURE: 82 MMHG | SYSTOLIC BLOOD PRESSURE: 138 MMHG | HEART RATE: 73 BPM | TEMPERATURE: 98.2 F

## 2024-09-03 DIAGNOSIS — C90.00 MULTIPLE MYELOMA NOT HAVING ACHIEVED REMISSION (H): Primary | ICD-10-CM

## 2024-09-03 LAB
BASOPHILS # BLD AUTO: 0.1 10E3/UL (ref 0–0.2)
BASOPHILS NFR BLD AUTO: 1 %
EOSINOPHIL # BLD AUTO: 0.5 10E3/UL (ref 0–0.7)
EOSINOPHIL NFR BLD AUTO: 9 %
ERYTHROCYTE [DISTWIDTH] IN BLOOD BY AUTOMATED COUNT: 14.8 % (ref 10–15)
HCT VFR BLD AUTO: 36.5 % (ref 35–47)
HGB BLD-MCNC: 12.1 G/DL (ref 11.7–15.7)
IMM GRANULOCYTES # BLD: 0.1 10E3/UL
IMM GRANULOCYTES NFR BLD: 2 %
LYMPHOCYTES # BLD AUTO: 1.8 10E3/UL (ref 0.8–5.3)
LYMPHOCYTES NFR BLD AUTO: 31 %
MCH RBC QN AUTO: 30.3 PG (ref 26.5–33)
MCHC RBC AUTO-ENTMCNC: 33.2 G/DL (ref 31.5–36.5)
MCV RBC AUTO: 92 FL (ref 78–100)
MONOCYTES # BLD AUTO: 0.8 10E3/UL (ref 0–1.3)
MONOCYTES NFR BLD AUTO: 13 %
NEUTROPHILS # BLD AUTO: 2.7 10E3/UL (ref 1.6–8.3)
NEUTROPHILS NFR BLD AUTO: 45 %
NRBC # BLD AUTO: 0 10E3/UL
NRBC BLD AUTO-RTO: 0 /100
PLATELET # BLD AUTO: 241 10E3/UL (ref 150–450)
RBC # BLD AUTO: 3.99 10E6/UL (ref 3.8–5.2)
WBC # BLD AUTO: 6 10E3/UL (ref 4–11)

## 2024-09-03 PROCEDURE — 250N000011 HC RX IP 250 OP 636: Mod: JZ | Performed by: NURSE PRACTITIONER

## 2024-09-03 PROCEDURE — 36415 COLL VENOUS BLD VENIPUNCTURE: CPT | Performed by: NURSE PRACTITIONER

## 2024-09-03 PROCEDURE — 96401 CHEMO ANTI-NEOPL SQ/IM: CPT

## 2024-09-03 PROCEDURE — 85048 AUTOMATED LEUKOCYTE COUNT: CPT | Mod: ZL | Performed by: NURSE PRACTITIONER

## 2024-09-03 RX ORDER — DIPHENHYDRAMINE HCL 50 MG
50 CAPSULE ORAL ONCE
Status: COMPLETED | OUTPATIENT
Start: 2024-09-03 | End: 2024-09-03

## 2024-09-03 RX ORDER — MONTELUKAST SODIUM 10 MG/1
10 TABLET ORAL ONCE
Status: COMPLETED | OUTPATIENT
Start: 2024-09-03 | End: 2024-09-03

## 2024-09-03 RX ORDER — DEXAMETHASONE 4 MG/1
20 TABLET ORAL ONCE
Status: COMPLETED | OUTPATIENT
Start: 2024-09-03 | End: 2024-09-03

## 2024-09-03 RX ORDER — ACETAMINOPHEN 325 MG/1
650 TABLET ORAL ONCE
Status: COMPLETED | OUTPATIENT
Start: 2024-09-03 | End: 2024-09-03

## 2024-09-03 RX ADMIN — Medication 50 MG: at 09:08

## 2024-09-03 RX ADMIN — ACETAMINOPHEN 650 MG: 325 TABLET ORAL at 09:09

## 2024-09-03 RX ADMIN — DEXAMETHASONE 20 MG: 4 TABLET ORAL at 09:09

## 2024-09-03 RX ADMIN — DARATUMUMAB AND HYALURONIDASE-FIHJ (HUMAN RECOMBINANT) 1800 MG: 1800; 30000 INJECTION SUBCUTANEOUS at 10:04

## 2024-09-03 RX ADMIN — MONTELUKAST SODIUM 10 MG: 10 TABLET ORAL at 09:09

## 2024-09-03 NOTE — PROGRESS NOTES
Infusion Nursing Note:  Carmelitamanuel Watts presents today for Faspro.    Patient seen by provider today: No   present during visit today: Not Applicable.    Note:   Component      Latest Ref Rng 9/3/2024  8:50 AM   WBC      4.0 - 11.0 10e3/uL 6.0    RBC Count      3.80 - 5.20 10e6/uL 3.99    Hemoglobin      11.7 - 15.7 g/dL 12.1    Hematocrit      35.0 - 47.0 % 36.5    MCV      78 - 100 fL 92    MCH      26.5 - 33.0 pg 30.3    MCHC      31.5 - 36.5 g/dL 33.2    RDW      10.0 - 15.0 % 14.8    Platelet Count      150 - 450 10e3/uL 241    % Neutrophils      % 45    % Lymphocytes      % 31    % Monocytes      % 13    % Eosinophils      % 9    % Basophils      % 1    % Immature Granulocytes      % 2    NRBCs per 100 WBC      <1 /100 0    Absolute Neutrophils      1.6 - 8.3 10e3/uL 2.7    Absolute Lymphocytes      0.8 - 5.3 10e3/uL 1.8    Absolute Monocytes      0.0 - 1.3 10e3/uL 0.8    Absolute Eosinophils      0.0 - 0.7 10e3/uL 0.5    Absolute Basophils      0.0 - 0.2 10e3/uL 0.1    Absolute Immature Granulocytes      <=0.4 10e3/uL 0.1    Absolute NRBCs      10e3/uL 0.0          Intravenous Access:  Lab draw site RAC, Needle type butterfly, Gauge 23.    Treatment Conditions:  Results reviewed, labs MET treatment parameters, ok to proceed with treatment.    Post Infusion Assessment:  Patient tolerated injection without incident into right side abdomen.     Discharge Plan:   Departure Mode: Ambulatory.

## 2024-09-04 ENCOUNTER — TELEPHONE (OUTPATIENT)
Dept: FAMILY MEDICINE | Facility: OTHER | Age: 74
End: 2024-09-04

## 2024-09-04 DIAGNOSIS — E78.2 MIXED HYPERLIPIDEMIA: ICD-10-CM

## 2024-09-04 RX ORDER — ATORVASTATIN CALCIUM 20 MG/1
20 TABLET, FILM COATED ORAL DAILY
Qty: 90 TABLET | Refills: 0 | Status: SHIPPED | OUTPATIENT
Start: 2024-09-04

## 2024-09-04 NOTE — TELEPHONE ENCOUNTER
Reason for call:  Medication      Have you contacted your pharmacy? Yes   If patient has contacted Pharmacy and it has been over 72hrs, continue to #2  Medication atorvastatin (LIPITOR) 20 MG tablet   What Pharmacy do you use? [facility]      (Please note that the turn-around-time for prescriptions is 72 business hours; I am sending your request at this time. SEND TO appropriate Care Team Pool )

## 2024-09-10 ENCOUNTER — LAB (OUTPATIENT)
Dept: LAB | Facility: OTHER | Age: 74
End: 2024-09-10
Payer: MEDICARE

## 2024-09-10 ENCOUNTER — TELEPHONE (OUTPATIENT)
Dept: ONCOLOGY | Facility: CLINIC | Age: 74
End: 2024-09-10
Payer: COMMERCIAL

## 2024-09-10 ENCOUNTER — DOCUMENTATION ONLY (OUTPATIENT)
Dept: ONCOLOGY | Facility: CLINIC | Age: 74
End: 2024-09-10
Payer: COMMERCIAL

## 2024-09-10 DIAGNOSIS — C90.00 MULTIPLE MYELOMA NOT HAVING ACHIEVED REMISSION (H): ICD-10-CM

## 2024-09-10 DIAGNOSIS — C90.00 MULTIPLE MYELOMA NOT HAVING ACHIEVED REMISSION (H): Primary | ICD-10-CM

## 2024-09-10 LAB
ALBUMIN SERPL BCG-MCNC: 4.1 G/DL (ref 3.5–5.2)
ALP SERPL-CCNC: 148 U/L (ref 40–150)
ALT SERPL W P-5'-P-CCNC: 14 U/L (ref 0–50)
ANION GAP SERPL CALCULATED.3IONS-SCNC: 12 MMOL/L (ref 7–15)
AST SERPL W P-5'-P-CCNC: 15 U/L (ref 0–45)
BASOPHILS # BLD AUTO: 0 10E3/UL (ref 0–0.2)
BASOPHILS NFR BLD AUTO: 0 %
BILIRUB SERPL-MCNC: 0.7 MG/DL
BUN SERPL-MCNC: 19.1 MG/DL (ref 8–23)
CALCIUM SERPL-MCNC: 9.6 MG/DL (ref 8.8–10.4)
CHLORIDE SERPL-SCNC: 103 MMOL/L (ref 98–107)
CREAT SERPL-MCNC: 0.66 MG/DL (ref 0.51–0.95)
EGFRCR SERPLBLD CKD-EPI 2021: >90 ML/MIN/1.73M2
EOSINOPHIL # BLD AUTO: 0 10E3/UL (ref 0–0.7)
EOSINOPHIL NFR BLD AUTO: 0 %
ERYTHROCYTE [DISTWIDTH] IN BLOOD BY AUTOMATED COUNT: 14.7 % (ref 10–15)
GLUCOSE SERPL-MCNC: 200 MG/DL (ref 70–99)
HCO3 SERPL-SCNC: 20 MMOL/L (ref 22–29)
HCT VFR BLD AUTO: 36 % (ref 35–47)
HGB BLD-MCNC: 12.3 G/DL (ref 11.7–15.7)
IMM GRANULOCYTES # BLD: 0.4 10E3/UL
IMM GRANULOCYTES NFR BLD: 4 %
LYMPHOCYTES # BLD AUTO: 1.5 10E3/UL (ref 0.8–5.3)
LYMPHOCYTES NFR BLD AUTO: 17 %
MCH RBC QN AUTO: 30.8 PG (ref 26.5–33)
MCHC RBC AUTO-ENTMCNC: 34.2 G/DL (ref 31.5–36.5)
MCV RBC AUTO: 90 FL (ref 78–100)
MONOCYTES # BLD AUTO: 1 10E3/UL (ref 0–1.3)
MONOCYTES NFR BLD AUTO: 11 %
NEUTROPHILS # BLD AUTO: 6 10E3/UL (ref 1.6–8.3)
NEUTROPHILS NFR BLD AUTO: 68 %
NRBC # BLD AUTO: 0 10E3/UL
NRBC BLD AUTO-RTO: 0 /100
PLATELET # BLD AUTO: 168 10E3/UL (ref 150–450)
POTASSIUM SERPL-SCNC: 3.8 MMOL/L (ref 3.4–5.3)
PROT SERPL-MCNC: 6.3 G/DL (ref 6.4–8.3)
RBC # BLD AUTO: 3.99 10E6/UL (ref 3.8–5.2)
SODIUM SERPL-SCNC: 135 MMOL/L (ref 135–145)
TOTAL PROTEIN SERUM FOR ELP: 6.2 G/DL (ref 6.4–8.3)
WBC # BLD AUTO: 9 10E3/UL (ref 4–11)

## 2024-09-10 PROCEDURE — 86334 IMMUNOFIX E-PHORESIS SERUM: CPT | Mod: 26 | Performed by: PATHOLOGY

## 2024-09-10 PROCEDURE — 36415 COLL VENOUS BLD VENIPUNCTURE: CPT | Mod: ZL

## 2024-09-10 PROCEDURE — 82232 ASSAY OF BETA-2 PROTEIN: CPT | Mod: ZL

## 2024-09-10 PROCEDURE — 84165 PROTEIN E-PHORESIS SERUM: CPT | Mod: 26 | Performed by: PATHOLOGY

## 2024-09-10 PROCEDURE — 83521 IG LIGHT CHAINS FREE EACH: CPT | Mod: ZL

## 2024-09-10 PROCEDURE — 86334 IMMUNOFIX E-PHORESIS SERUM: CPT | Mod: ZL | Performed by: STUDENT IN AN ORGANIZED HEALTH CARE EDUCATION/TRAINING PROGRAM

## 2024-09-10 PROCEDURE — 82784 ASSAY IGA/IGD/IGG/IGM EACH: CPT | Mod: ZL

## 2024-09-10 PROCEDURE — 84155 ASSAY OF PROTEIN SERUM: CPT | Mod: ZL

## 2024-09-10 PROCEDURE — 84165 PROTEIN E-PHORESIS SERUM: CPT | Mod: ZL | Performed by: STUDENT IN AN ORGANIZED HEALTH CARE EDUCATION/TRAINING PROGRAM

## 2024-09-10 PROCEDURE — 82040 ASSAY OF SERUM ALBUMIN: CPT | Mod: ZL

## 2024-09-10 PROCEDURE — 85810 BLOOD VISCOSITY EXAMINATION: CPT | Mod: ZL

## 2024-09-10 PROCEDURE — 85049 AUTOMATED PLATELET COUNT: CPT | Mod: ZL

## 2024-09-10 RX ORDER — DEXAMETHASONE 4 MG/1
TABLET ORAL
Qty: 15 TABLET | Refills: 0 | Status: SHIPPED | OUTPATIENT
Start: 2024-09-17

## 2024-09-10 NOTE — TELEPHONE ENCOUNTER
Oral Chemotherapy Monitoring Program     Medication: Pomalyst  Rx: 1mg PO daily on days 1 through 21 of 28 day cycle   REMS AUTH [id number]  Routine survey questions reviewed  Rx to be Escribed to [facility]

## 2024-09-12 LAB
ALBUMIN SERPL ELPH-MCNC: 3.9 G/DL (ref 3.7–5.1)
ALPHA1 GLOB SERPL ELPH-MCNC: 0.3 G/DL (ref 0.2–0.4)
ALPHA2 GLOB SERPL ELPH-MCNC: 0.8 G/DL (ref 0.5–0.9)
B-GLOBULIN SERPL ELPH-MCNC: 0.7 G/DL (ref 0.6–1)
B2 MICROGLOB TUMOR MARKER SER-MCNC: 1.8 MG/L
GAMMA GLOB SERPL ELPH-MCNC: 0.5 G/DL (ref 0.7–1.6)
IGA SERPL-MCNC: 27 MG/DL (ref 84–499)
IGG SERPL-MCNC: 439 MG/DL (ref 610–1616)
IGM SERPL-MCNC: 45 MG/DL (ref 35–242)
KAPPA LC FREE SER-MCNC: 0.43 MG/DL (ref 0.33–1.94)
KAPPA LC FREE/LAMBDA FREE SER NEPH: 1.26 {RATIO} (ref 0.26–1.65)
LAMBDA LC FREE SERPL-MCNC: 0.34 MG/DL (ref 0.57–2.63)
M PROTEIN SERPL ELPH-MCNC: 0.1 G/DL
PROT PATTERN SERPL ELPH-IMP: ABNORMAL
PROT PATTERN SERPL IFE-IMP: NORMAL
VISC SER: 1.5 CP (ref 1.4–1.8)

## 2024-09-17 ENCOUNTER — LAB (OUTPATIENT)
Dept: LAB | Facility: OTHER | Age: 74
End: 2024-09-17
Attending: NURSE PRACTITIONER
Payer: MEDICARE

## 2024-09-17 ENCOUNTER — ONCOLOGY VISIT (OUTPATIENT)
Dept: ONCOLOGY | Facility: OTHER | Age: 74
End: 2024-09-17
Attending: NURSE PRACTITIONER
Payer: MEDICARE

## 2024-09-17 ENCOUNTER — INFUSION THERAPY VISIT (OUTPATIENT)
Dept: INFUSION THERAPY | Facility: OTHER | Age: 74
End: 2024-09-17
Attending: INTERNAL MEDICINE
Payer: MEDICARE

## 2024-09-17 VITALS
SYSTOLIC BLOOD PRESSURE: 102 MMHG | RESPIRATION RATE: 16 BRPM | HEART RATE: 92 BPM | TEMPERATURE: 99 F | BODY MASS INDEX: 30.93 KG/M2 | OXYGEN SATURATION: 96 % | DIASTOLIC BLOOD PRESSURE: 70 MMHG | WEIGHT: 174.6 LBS

## 2024-09-17 DIAGNOSIS — C90.00 MULTIPLE MYELOMA NOT HAVING ACHIEVED REMISSION (H): Primary | ICD-10-CM

## 2024-09-17 LAB
ALBUMIN SERPL BCG-MCNC: 4.1 G/DL (ref 3.5–5.2)
ALP SERPL-CCNC: 141 U/L (ref 40–150)
ALT SERPL W P-5'-P-CCNC: 13 U/L (ref 0–50)
ANION GAP SERPL CALCULATED.3IONS-SCNC: 13 MMOL/L (ref 7–15)
AST SERPL W P-5'-P-CCNC: 15 U/L (ref 0–45)
BASOPHILS # BLD AUTO: 0.1 10E3/UL (ref 0–0.2)
BASOPHILS NFR BLD AUTO: 2 %
BILIRUB SERPL-MCNC: 0.4 MG/DL
BUN SERPL-MCNC: 20.1 MG/DL (ref 8–23)
CALCIUM SERPL-MCNC: 9.1 MG/DL (ref 8.8–10.4)
CHLORIDE SERPL-SCNC: 103 MMOL/L (ref 98–107)
CREAT SERPL-MCNC: 0.94 MG/DL (ref 0.51–0.95)
EGFRCR SERPLBLD CKD-EPI 2021: 63 ML/MIN/1.73M2
EOSINOPHIL # BLD AUTO: 0.3 10E3/UL (ref 0–0.7)
EOSINOPHIL NFR BLD AUTO: 5 %
ERYTHROCYTE [DISTWIDTH] IN BLOOD BY AUTOMATED COUNT: 15.2 % (ref 10–15)
GLUCOSE SERPL-MCNC: 130 MG/DL (ref 70–99)
HCO3 SERPL-SCNC: 22 MMOL/L (ref 22–29)
HCT VFR BLD AUTO: 38.5 % (ref 35–47)
HGB BLD-MCNC: 12.9 G/DL (ref 11.7–15.7)
IMM GRANULOCYTES # BLD: 0.1 10E3/UL
IMM GRANULOCYTES NFR BLD: 1 %
LYMPHOCYTES # BLD AUTO: 2.6 10E3/UL (ref 0.8–5.3)
LYMPHOCYTES NFR BLD AUTO: 41 %
MCH RBC QN AUTO: 30.3 PG (ref 26.5–33)
MCHC RBC AUTO-ENTMCNC: 33.5 G/DL (ref 31.5–36.5)
MCV RBC AUTO: 90 FL (ref 78–100)
MONOCYTES # BLD AUTO: 0.9 10E3/UL (ref 0–1.3)
MONOCYTES NFR BLD AUTO: 14 %
NEUTROPHILS # BLD AUTO: 2.3 10E3/UL (ref 1.6–8.3)
NEUTROPHILS NFR BLD AUTO: 37 %
NRBC # BLD AUTO: 0 10E3/UL
NRBC BLD AUTO-RTO: 0 /100
PLATELET # BLD AUTO: 221 10E3/UL (ref 150–450)
POTASSIUM SERPL-SCNC: 4 MMOL/L (ref 3.4–5.3)
PROT SERPL-MCNC: 6.1 G/DL (ref 6.4–8.3)
RBC # BLD AUTO: 4.26 10E6/UL (ref 3.8–5.2)
SODIUM SERPL-SCNC: 138 MMOL/L (ref 135–145)
WBC # BLD AUTO: 6.3 10E3/UL (ref 4–11)

## 2024-09-17 PROCEDURE — G0463 HOSPITAL OUTPT CLINIC VISIT: HCPCS | Mod: 25

## 2024-09-17 PROCEDURE — 250N000011 HC RX IP 250 OP 636: Mod: JZ | Performed by: NURSE PRACTITIONER

## 2024-09-17 PROCEDURE — 36415 COLL VENOUS BLD VENIPUNCTURE: CPT | Mod: ZL | Performed by: NURSE PRACTITIONER

## 2024-09-17 PROCEDURE — 99214 OFFICE O/P EST MOD 30 MIN: CPT | Performed by: NURSE PRACTITIONER

## 2024-09-17 PROCEDURE — G0463 HOSPITAL OUTPT CLINIC VISIT: HCPCS

## 2024-09-17 PROCEDURE — 96401 CHEMO ANTI-NEOPL SQ/IM: CPT

## 2024-09-17 PROCEDURE — G2211 COMPLEX E/M VISIT ADD ON: HCPCS | Performed by: NURSE PRACTITIONER

## 2024-09-17 PROCEDURE — 85025 COMPLETE CBC W/AUTO DIFF WBC: CPT | Mod: ZL | Performed by: NURSE PRACTITIONER

## 2024-09-17 PROCEDURE — 80053 COMPREHEN METABOLIC PANEL: CPT | Mod: ZL | Performed by: NURSE PRACTITIONER

## 2024-09-17 RX ORDER — DIPHENHYDRAMINE HCL 50 MG
50 CAPSULE ORAL ONCE
Status: COMPLETED | OUTPATIENT
Start: 2024-09-17 | End: 2024-09-17

## 2024-09-17 RX ORDER — MEPERIDINE HYDROCHLORIDE 25 MG/ML
25 INJECTION INTRAMUSCULAR; INTRAVENOUS; SUBCUTANEOUS EVERY 30 MIN PRN
Status: CANCELLED | OUTPATIENT
Start: 2024-09-17

## 2024-09-17 RX ORDER — DIPHENHYDRAMINE HCL 50 MG
50 CAPSULE ORAL
Status: CANCELLED | OUTPATIENT
Start: 2024-09-17

## 2024-09-17 RX ORDER — ALBUTEROL SULFATE 0.83 MG/ML
2.5 SOLUTION RESPIRATORY (INHALATION)
Status: CANCELLED | OUTPATIENT
Start: 2024-09-17

## 2024-09-17 RX ORDER — ALBUTEROL SULFATE 90 UG/1
1-2 AEROSOL, METERED RESPIRATORY (INHALATION)
Status: CANCELLED
Start: 2024-09-17

## 2024-09-17 RX ORDER — ACETAMINOPHEN 325 MG/1
650 TABLET ORAL
Status: CANCELLED | OUTPATIENT
Start: 2024-09-17

## 2024-09-17 RX ORDER — MONTELUKAST SODIUM 10 MG/1
10 TABLET ORAL ONCE
Status: COMPLETED | OUTPATIENT
Start: 2024-09-17 | End: 2024-09-17

## 2024-09-17 RX ORDER — ACETAMINOPHEN 325 MG/1
650 TABLET ORAL ONCE
Status: CANCELLED | OUTPATIENT
Start: 2024-09-17

## 2024-09-17 RX ORDER — LORAZEPAM 2 MG/ML
0.5 INJECTION INTRAMUSCULAR EVERY 4 HOURS PRN
Status: CANCELLED | OUTPATIENT
Start: 2024-09-17

## 2024-09-17 RX ORDER — DIPHENHYDRAMINE HYDROCHLORIDE 50 MG/ML
50 INJECTION INTRAMUSCULAR; INTRAVENOUS
Status: CANCELLED
Start: 2024-09-17

## 2024-09-17 RX ORDER — EPINEPHRINE 1 MG/ML
0.3 INJECTION, SOLUTION, CONCENTRATE INTRAVENOUS EVERY 5 MIN PRN
Status: CANCELLED | OUTPATIENT
Start: 2024-09-17

## 2024-09-17 RX ORDER — METHYLPREDNISOLONE SODIUM SUCCINATE 125 MG/2ML
125 INJECTION, POWDER, LYOPHILIZED, FOR SOLUTION INTRAMUSCULAR; INTRAVENOUS
Status: CANCELLED
Start: 2024-09-17

## 2024-09-17 RX ORDER — ACETAMINOPHEN 325 MG/1
650 TABLET ORAL ONCE
Status: COMPLETED | OUTPATIENT
Start: 2024-09-17 | End: 2024-09-17

## 2024-09-17 RX ORDER — DIPHENHYDRAMINE HCL 50 MG
50 CAPSULE ORAL ONCE
Status: CANCELLED | OUTPATIENT
Start: 2024-09-17

## 2024-09-17 RX ORDER — DEXAMETHASONE 4 MG/1
20 TABLET ORAL ONCE
Status: COMPLETED | OUTPATIENT
Start: 2024-09-17 | End: 2024-09-17

## 2024-09-17 RX ADMIN — DARATUMUMAB AND HYALURONIDASE-FIHJ (HUMAN RECOMBINANT) 1800 MG: 1800; 30000 INJECTION SUBCUTANEOUS at 14:24

## 2024-09-17 RX ADMIN — Medication 50 MG: at 13:46

## 2024-09-17 RX ADMIN — ACETAMINOPHEN 650 MG: 325 TABLET ORAL at 13:45

## 2024-09-17 RX ADMIN — DEXAMETHASONE 20 MG: 4 TABLET ORAL at 13:43

## 2024-09-17 RX ADMIN — MONTELUKAST SODIUM 10 MG: 10 TABLET ORAL at 13:46

## 2024-09-17 ASSESSMENT — PAIN SCALES - GENERAL: PAINLEVEL: NO PAIN (0)

## 2024-09-17 NOTE — NURSING NOTE
"Oncology Rooming Note    September 17, 2024 1:05 PM   Carmelita Watts is a 73 year old female who presents for:    Chief Complaint   Patient presents with    Oncology Clinic Visit     Follow up Multiple myeloma not having achieved remission      Initial Vitals: /70   Pulse 92   Temp 99  F (37.2  C) (Tympanic)   Resp 16   Wt 79.2 kg (174 lb 9.7 oz)   SpO2 96%   BMI 30.93 kg/m   Estimated body mass index is 30.93 kg/m  as calculated from the following:    Height as of 8/20/24: 1.6 m (5' 3\").    Weight as of this encounter: 79.2 kg (174 lb 9.7 oz). Body surface area is 1.88 meters squared.  No Pain (0) Comment: Data Unavailable   No LMP recorded. Patient is postmenopausal.  Allergies reviewed: Yes  Medications reviewed: Yes    Medications: Medication refills not needed today.  Pharmacy name entered into [employer]:    [id number] - [place], [place] - [address] AT [facility] [place] [facility] [address]  [facility] - [place] - [address]    Frailty Screening:   Is the patient here for a new oncology consult visit in cancer care? 2. No      Clinical concerns: none       Sarahi Esqueda LPN             "

## 2024-09-17 NOTE — PROGRESS NOTES
Oncology Follow-up Visit    Reason for Visit:  Carmelita is a 73 year old woman with a diagnosis of multiple myeloma, who I am visiting with today for routine follow-up and consideration of ongoing therapy.     Nursing Note and documentation reviewed: Yes    Interval History:   Carlene notes that she is doing well. No new concerns today. No recent or recurrent infections. She was seen in ED since our last visit for what she described as lung pain. Exam unremarkable. Pain had resolved prior to CTA and she didn't want to get it. No further lung concerns. No SOB or cough. No bleeding concerns. Appetite is good. No bowel concerns. Energy is a little low but good at staying active. Overall, doing well.     Oncologic History  12/31/2018 Presented to the emergency room with vertigo and fatigue.  CT scan of the head was negative and subsequent stress test was negative.  05/03/2019 PCP ordered lab work and noted a total protein of 12.9.  SPEP at that time showed an M spike of 6.2 with a large monoclonal protein seen in the gamma fraction. Urine immunofixation showed a possible small protein band in the gamma fraction  05/31/2019 Evaluated by Dr. Walker with Medical Oncology with plan to rule out myeloma and obtain bone marrow aspiration biopsy as well as a metastatic bone survey along with additional labwork  06/18/2019 Underwent bone marrow aspiration and biopsy  06/24/2019 Seen again by Dr. Walker and CBC showed a hemoglobin of 9.3, M spike 7.3 with monoclonal IgG immunoglobulin of kappa light chain type; serum viscosity was 2.9; quantitative immunoglobulins showed an IgG of 8160, beta-2 microglobulin was 5.8, BUN was 21 with creatinine is 0.8 and total protein was 13.7.  Quantitative kappa/lambda free light chains showed an elevated ratio of 17.0 bone marrow aspiration biopsy showed plasma cell myeloma with approximately 80% plasma cells.  Immunofixation showed IgG kappa and flow cytometry revealed kappa monotypic plasma  cells consistent with clonal plasma cell neoplasm and FISH panel was pending at that time. It was felt she had at least stage II disease based on her beta-2 microglobulin and anemia. Plan was to treat with Velcade 1.3 mg per/m2 days 1, 4, 8 and 11/Decadron 40 mg on days 1, 8 and 15. Initiation of Revlimid with C2 at 25 mg daily days 1 through 14.  Plan was to also obtain an MRI of the lumbar spine to rule out lytic lesion at L3.  She was initiated on Zovirax 400 mg p.o. twice daily.  06/25/2019 C1 of chemotherapy initiated  07/01/2019 Note in chart regarding patient's large co-pay for the Revlimid and no plan at this point to initiate Revlimid and treat only with Velcade and Decadron per Dr. Walker  07/11/2019 MRI lumbar spine shows a pathologic superior endplate compression fracture at L3 without evidence of retropulsed fragment and innumerable enhancing lesions throughout the lumbar spine consistent with history of multiple myeloma.  09/10/2019 Increasing M-spike and kappa lambda ratio  10/01/2019 Initiation of CyBorD  11/17/2020 Plateau of M-spike at 1.2 after 36 cycles of CyBorD  12/01/2020 Initiation of Darzalex Faspro injection  03/23/2021 M-spike increased form 1.0 to 1.2 and velcade and dexamethasone added to plan  04/12/2022 Treatment HOLIDAY commenced with Mspike 0.3 after 22 cycles of treatent  05/27/2022 Received Evusheld while on treatment holiday.   07/18/2023 Mspike 0.8. Other labs stable. PET scan ordered.   08/02/2023 PET completed showing negative study. No evidence of mass or lymphadenopathy. No concerning hypermetabolism is present.  08/14/2023 Met with Dr. Walker. Given biochemical progression, he did feel appropriate to resume therapy again with Darzalex faspro  03/12/2023 Following C7, mspike magy from 0.2-->0.8. PET ordered which was negative. Again increased frequency of Darzalex with anticipation of adding in Pomalyst with C2.    04/29/2024 Addition of Pomalyst with C2    Current Chemo  "Regime/TX: Darzalex faspro injection 1800mg subcutaneous per protocol and Pomalyst 1 mg days 1-21 of 28 day cycle given with weekly Dex  Current Cycle: C7   Completed cycles: 6  **Pom added C2 (4/29)     Previous treatment:     Velcade 1.3 mg/m2 days 1, 4, 8 and 11 with Decadron 40 mg days 1, 8 and 15 x 4 cycles;     Velcade 1.5mg.m2, cyclophosphamide 150mg every 7 days on days 1,8,15 and 22 with decadron 40mg days 1,8,15,22  Darzalex faspro injection 1800mg subcutaneous per protocol    Past Medical History:   Diagnosis Date    Arthritis     Cyst of breast     Depressive disorder     Diabetes mellitus, type 2 (H) 01/18/2021    Essential hypertension 10/01/2015    Major depressive disorder, recurrent episode, mild (H24) 10/01/2015    Mixed hyperlipidemia 10/01/2015    Multiple myeloma not having achieved remission (H) 06/24/2019    Other specified disorders of bladder 07/09/2012    Irritable Bladder    Seasonal allergies 10/01/2015    Unspecified essential hypertension 03/19/2007    Unspecified sinusitis (chronic) 09/05/2007       Past Surgical History:   Procedure Laterality Date    APPENDECTOMY      BONE MARROW BIOPSY, BONE SPECIMEN, NEEDLE/TROCAR N/A 06/18/2019    Procedure: BONE MARROW BIOPSY;  Surgeon: Maciej Sanz MD;  Location: [facility] OR    CHOLECYSTECTOMY      COLONOSCOPY  07/18/2006    repeat 10 years    COLONOSCOPY N/A 12/30/2016    Procedure: COLONOSCOPY;  Surgeon: Bhaskar Franklin DO;  Location: [facility] OR    PUNC/ASPIR BREAST CYST Left 1995    benign    SINUS SURGERY      TUBAL STERILIZATION         Family History   Problem Relation Age of Onset    Breast Cancer Mother 60        Cause of Death; 1 breast    Parkinsonism Father         \"Possible\"    Coronary Artery Disease Father     Pacemaker Father     Thyroid Disease Daughter     Breast Cancer Maternal Aunt         over 50    Diabetes No family hx of     Hypertension No family hx of     Hyperlipidemia No family hx of     Cerebrovascular Disease No " family hx of     Colon Cancer No family hx of     Prostate Cancer No family hx of     Genetic Disorder No family hx of     Asthma No family hx of     Anesthesia Reaction No family hx of        Social History     Socioeconomic History    Marital status:      Spouse name: Not on file    Number of children: Not on file    Years of education: Not on file    Highest education level: Not on file   Occupational History    Occupation: Financial     Comment:  - (FT)   Tobacco Use    Smoking status: Never    Smokeless tobacco: Never    Tobacco comments:     Tried to Quit (YES); QUIT in 1971; Passive Exposure (NO)   Vaping Use    Vaping status: Never Used   Substance and Sexual Activity    Alcohol use: Yes     Comment: RARELY    Drug use: No    Sexual activity: Yes     Partners: Male     Birth control/protection: None   Other Topics Concern     Service Not Asked    Blood Transfusions Not Asked    Caffeine Concern Yes     Comment: Coffee >6 cups daily    Occupational Exposure Not Asked    Hobby Hazards Not Asked    Sleep Concern Not Asked    Stress Concern Not Asked    Weight Concern Not Asked    Special Diet Not Asked    Back Care Not Asked    Exercise Not Asked    Bike Helmet Not Asked    Seat Belt Not Asked    Self-Exams Not Asked    Parent/sibling w/ CABG, MI or angioplasty before 65F 55M? No   Social History Narrative    Not on file     Social Determinants of Health     Financial Resource Strain: Low Risk  (11/27/2023)    Financial Resource Strain     Within the past 12 months, have you or your family members you live with been unable to get utilities (heat, electricity) when it was really needed?: No   Food Insecurity: Low Risk  (11/27/2023)    Food Insecurity     Within the past 12 months, did you worry that your food would run out before you got money to buy more?: No     Within the past 12 months, did the food you bought just not last and you didn t have money to get more?: No    Transportation Needs: Low Risk  (11/27/2023)    Transportation Needs     Within the past 12 months, has lack of transportation kept you from medical appointments, getting your medicines, non-medical meetings or appointments, work, or from getting things that you need?: No   Physical Activity: Not on file   Stress: Not on file   Social Connections: Not on file   Interpersonal Safety: Low Risk  (9/3/2024)    Interpersonal Safety     Do you feel physically and emotionally safe where you currently live?: Yes     Within the past 12 months, have you been hit, slapped, kicked or otherwise physically hurt by someone?: No     Within the past 12 months, have you been humiliated or emotionally abused in other ways by your partner or ex-partner?: No   Housing Stability: Low Risk  (11/27/2023)    Housing Stability     Do you have housing? : Yes     Are you worried about losing your housing?: No       Current Outpatient Medications   Medication Sig Dispense Refill    acyclovir (ZOVIRAX) 400 MG tablet Take 1 tablet (400 mg) by mouth 2 times daily Start 1 week prior to daratumumab and continue for 3 months after daratumumab treatment complete. 60 tablet 11    aspirin (ASA) 81 MG chewable tablet Take 81 mg by mouth daily      atorvastatin (LIPITOR) 20 MG tablet TAKE 1 TABLET BY MOUTH DAILY 90 tablet 0    cetirizine (ZYRTEC) 10 MG tablet Take 1 tablet (10 mg) by mouth daily 30 tablet 1    dexAMETHasone (DECADRON) 4 MG tablet Take 5 tablets (20 mg) on Days 8, 15, and 22. 15 tablet 0    diphenhydrAMINE (BENADRYL) 25 MG capsule Take 25 mg by mouth daily      fluticasone (FLONASE) 50 MCG/ACT nasal spray SPRAY 2 SPRAYS INTO BOTH NOSTRILS DAILY 48 mL 0    hydrocortisone (CORTAID) 1 % external cream Apply topically 2 times daily sparingly over rash to face and chest 45 g 0    losartan (COZAAR) 50 MG tablet TAKE 1 TABLET BY MOUTH DAILY 90 tablet 3    pomalidomide (POMALYST) 1 MG capsule Take 1 capsule (1 mg) by mouth daily for 21 days Day  1 thru 21 of each 28 day cycle. Swallow whole with water. 21 capsule 0    sertraline (ZOLOFT) 50 MG tablet TAKE 1/2 TABLET BY MOUTH DAILY 30 tablet 4    L-Lysine HCl 500 MG CAPS Take by mouth daily (Patient not taking: Reported on 9/17/2024)      prochlorperazine (COMPAZINE) 10 MG tablet Take 1 tablet (10 mg) by mouth every 6 hours as needed for nausea or vomiting (Patient not taking: Reported on 9/17/2024) 30 tablet 2     No current facility-administered medications for this visit.     Facility-Administered Medications Ordered in Other Visits   Medication Dose Route Frequency Provider Last Rate Last Admin    acetaminophen (TYLENOL) tablet 650 mg  650 mg Oral Once Nida Gonzalez APRN CNP        daratumumab-hyaluronidase-fihj (DARZALEX FASPRO) SUBCUTANEOUS injection 1,800 mg  1,800 mg Subcutaneous Once Nida Gonzalez APRN CNP        dexAMETHasone (DECADRON) tablet 20 mg  20 mg Oral Once Elías Walker MD        diphenhydrAMINE (BENADRYL) capsule 50 mg  50 mg Oral Once Nida Gonzalez APRN CNP        montelukast (SINGULAIR) tablet 10 mg  10 mg Oral Once Elías Walker MD            Allergies   Allergen Reactions    Lisinopril Cough    Phenylephrine Hcl Other (See Comments)     **Entex  HEADACHE (SEVERE)    Phenylpropanolamine Other (See Comments)     **Entex  HEADACHE (SEVERE)    Pseudoephedrine Tannate Other (See Comments)     **Entex  HEADACHE (SEVERE)    Levofloxacin Rash     **Levaquin    Moxifloxacin Hcl [Moxifloxacin Hcl In Nacl] Rash       Review Of Systems:  A complete review of systems is negative except for the above mentioned items in the interval history.     ECOG PS: 0    Physical Exam:  /70   Pulse 92   Temp 99  F (37.2  C) (Tympanic)   Resp 16   Wt 79.2 kg (174 lb 9.7 oz)   SpO2 96%   BMI 30.93 kg/m    GENERAL APPEARANCE: Alert and in no acute distress.  HEENT: Eyes appear normal without scleral icterus. Extraocular movements intact.   NECK:   Supple  with normal range of motion. No asymmetry or masses. She does have some bilateral fullness without obvious mass.   LYMPHATICS: No palpable cervical, supraclavicular nodes.  RESP: Lungs clear to auscultation bilaterally, respirations regular and easy.  CARDIOVASCULAR: Regular rate and rhythm. Normal S1, S2; no murmur, gallop, or rub.  ABDOMEN: Soft, non-tender, non-distended. No palpable organomegaly or masses.  MUSCULOSKELETAL: Extremities without gross deformities noted. No edema of bilateral lower extremities.  SKIN: No suspicious lesions or rashes.  NEURO: Alert and oriented x 3.  Gait steady.  PSYCHIATRIC: Mentation and affect appear normal.  Mood appropriate.    Laboratory:  Results for orders placed or performed in visit on 09/17/24   Comprehensive metabolic panel     Status: Abnormal   Result Value Ref Range    Sodium 138 135 - 145 mmol/L    Potassium 4.0 3.4 - 5.3 mmol/L    Carbon Dioxide (CO2) 22 22 - 29 mmol/L    Anion Gap 13 7 - 15 mmol/L    Urea Nitrogen 20.1 8.0 - 23.0 mg/dL    Creatinine 0.94 0.51 - 0.95 mg/dL    GFR Estimate 63 >60 mL/min/1.73m2    Calcium 9.1 8.8 - 10.4 mg/dL    Chloride 103 98 - 107 mmol/L    Glucose 130 (H) 70 - 99 mg/dL    Alkaline Phosphatase 141 40 - 150 U/L    AST 15 0 - 45 U/L    ALT 13 0 - 50 U/L    Protein Total 6.1 (L) 6.4 - 8.3 g/dL    Albumin 4.1 3.5 - 5.2 g/dL    Bilirubin Total 0.4 <=1.2 mg/dL   CBC with platelets and differential     Status: Abnormal   Result Value Ref Range    WBC Count 6.3 4.0 - 11.0 10e3/uL    RBC Count 4.26 3.80 - 5.20 10e6/uL    Hemoglobin 12.9 11.7 - 15.7 g/dL    Hematocrit 38.5 35.0 - 47.0 %    MCV 90 78 - 100 fL    MCH 30.3 26.5 - 33.0 pg    MCHC 33.5 31.5 - 36.5 g/dL    RDW 15.2 (H) 10.0 - 15.0 %    Platelet Count 221 150 - 450 10e3/uL    % Neutrophils 37 %    % Lymphocytes 41 %    % Monocytes 14 %    % Eosinophils 5 %    % Basophils 2 %    % Immature Granulocytes 1 %    NRBCs per 100 WBC 0 <1 /100    Absolute Neutrophils 2.3 1.6 - 8.3 10e3/uL     Absolute Lymphocytes 2.6 0.8 - 5.3 10e3/uL    Absolute Monocytes 0.9 0.0 - 1.3 10e3/uL    Absolute Eosinophils 0.3 0.0 - 0.7 10e3/uL    Absolute Basophils 0.1 0.0 - 0.2 10e3/uL    Absolute Immature Granulocytes 0.1 <=0.4 10e3/uL    Absolute NRBCs 0.0 10e3/uL   CBC with platelets differential     Status: Abnormal    Narrative    The following orders were created for panel order CBC with platelets differential.  Procedure                               Abnormality         Status                     ---------                               -----------         ------                     CBC with platelets and d...[[id number]]  Abnormal            Final result                 Please view results for these tests on the individual orders.   Results for orders placed or performed in visit on 09/17/24   Protein Electrophoresis with IELP Reflex *Canceled*     Status: None ()    Narrative    The following orders were created for panel order Protein Electrophoresis with IELP Reflex.  Procedure                               Abnormality         Status                     ---------                               -----------         ------                     Protein Electrophoresis ...[[id number]]                                                 Total Protein, Serum for...[[id number]]                                                   Please view results for these tests on the individual orders.     Component      Latest Ref Rng 9/10/2024  11:34 AM   Albumin Fraction      3.7 - 5.1 g/dL 3.9    Alpha 1 Fraction      0.2 - 0.4 g/dL 0.3    Alpha 2 Fraction      0.5 - 0.9 g/dL 0.8    Beta Fraction      0.6 - 1.0 g/dL 0.7    Gamma Fraction      0.7 - 1.6 g/dL 0.5 (L)    Monoclonal Peak      <=0.0 g/dL 0.1 (H)    ELP Interpretation: Very small monoclonal protein (0.1 g/dL) seen in the gamma fraction. See immunofixation report on same specimen. Hypogammaglobulinemia. Pathologic significance requires clinical correlation. Clair Lyons M.D.,  Ph.D.    IGG      610 - 1,616 mg/dL 439 (L)    IGA      84 - 499 mg/dL 27 (L)    IGM      35 - 242 mg/dL 45    [place] Free Lt Chain      0.33 - 1.94 mg/dL 0.43    Lambda Free Lt Chain      0.57 - 2.63 mg/dL 0.34 (L)    Kappa Lambda Ratio      0.26 - 1.65  1.26    Beta-2-Microglobulin      <2.3 mg/L 1.8    Viscosity Index      1.4 - 1.8  1.5    Immunofixation ELP Monoclonal IgG immunoglobulin of kappa light chain type. Monoclonal antibody therapeutics (e.g. Daratumumab) may appear as monoclonal proteins on serum electrophoresis and immunofixation. Results should be interpreted with caution. Pathologic significance requires clinical correlation.  Clair Lyons M.D., Ph.D.    Total Protein Serum for ELP      6.4 - 8.3 g/dL 6.2 (L)       Legend:  (L) Low  (H) High      Imaging Studies:  None this visit.     ASSESSMENT/PLAN:  #1 Multiple myeloma IgG kappa multiple myeloma with 80% involvement of the bone marrow diagnosed June 2019 initially treated with Velcade and Decadron and received 4 cycles with increasing M-spike and kappa/lambda ratio.  Treatment changed to CyBorD on 10/1/2019.  M-spike plateau at 1.2 and treatment changed to monotherapy with Darzalex Faspro injection. M spike declined to 1.0 then increased again to 1.2 so Velcade and Dex added to her treatment plan with cycle 5 therapy. Following C22 (April 2022), she did initiate a treatment holiday when her Mspike was 0.3.     We had followed her counts for over a year. Unfortunately, more recently, she did see a rise in her mspike to 0.8. PET completed was negative. Dr. Walker felt this was a biochemical progression and after discussion with patient, elected to resume treatment with Darzalex faspro. Following 8 cycles, mspike began to rise. PET was negative but feeling like she wasn't responding to Darzalex alone, decided to add in Pomalyst 1 mg p.o. daily on days 1 through 21 to begin with cycle 2 in addition to dexamethasone 20 mg p.o. on days 1  ,8,15,22.    Today, she returns for C7 Darzalex with Pomalyst along with weekly Dexamethasone. Her mspike remains stable at 0.1. She is tolerating therapy with modest side effects. No contraindications to proceeding with therapy. Labs in 3 weeks with follow-up in 4 with planned Darzalex afterwards. Sooner with any concerns.     #2 Lytic lesions Hx lytic lesion at L3. Zometa q3m due Oct 2024. She remains on calcium with vitamin D twice a day. Weight bearing activity.          Patient in agreement with plan and verbalizes understanding. Agrees to call with any questions or concerns.      30 minutes spent in the patient's encounter today with time spent in review of patient's chart along with chart preparation and review of the treatment plan and signing of treatment plan.  Time was also spent with the patient in obtaining a review of systems and performing a physical exam along with detailed review of all test results. Time was also spent in discussing plan for future follow-up and relating instructions for follow-up and in placing future orders.    The longitudinal plan of care for the diagnosis(es)/condition(s) as documented were addressed during this visit. Due to the added complexity in care, I will continue to support Carmelita in the subsequent management and with ongoing continuity of care.        FLO Duque, FNP-C

## 2024-09-17 NOTE — PROGRESS NOTES
Infusion Nursing Note:  Carmelita Watts presents today for faspro.    Patient seen by provider today: Yes: Cynthia Gonzalez   present during visit today: Not Applicable.    Note: N/A.      Intravenous Access:  Labs drawn without difficulty.    Treatment Conditions:  Lab Results   Component Value Date    HGB 12.9 09/17/2024    WBC 6.3 09/17/2024    ANEU 8.8 (H) 08/06/2024    ANEUTAUTO 2.3 09/17/2024     09/17/2024        Lab Results   Component Value Date     09/17/2024    POTASSIUM 4.0 09/17/2024    CR 0.94 09/17/2024    PAYAL 9.1 09/17/2024    BILITOTAL 0.4 09/17/2024    ALBUMIN 4.1 09/17/2024    ALT 13 09/17/2024    AST 15 09/17/2024       Results reviewed, labs MET treatment parameters, ok to proceed with treatment.      Post Infusion Assessment:  Patient tolerated injection without incident.   The following medication was given:     MEDICATION: darzalex  ROUTE: SQ  SITE: [facility].      Discharge Plan:   Patient and/or family verbalized understanding of discharge instructions and all questions answered.      TERRENCE RITCHIE RN

## 2024-10-08 ENCOUNTER — LAB (OUTPATIENT)
Dept: LAB | Facility: OTHER | Age: 74
End: 2024-10-08
Payer: MEDICARE

## 2024-10-08 DIAGNOSIS — C90.00 MULTIPLE MYELOMA NOT HAVING ACHIEVED REMISSION (H): ICD-10-CM

## 2024-10-08 DIAGNOSIS — C90.00 MULTIPLE MYELOMA NOT HAVING ACHIEVED REMISSION (H): Primary | ICD-10-CM

## 2024-10-08 LAB — TOTAL PROTEIN SERUM FOR ELP: 6 G/DL (ref 6.4–8.3)

## 2024-10-08 PROCEDURE — 36415 COLL VENOUS BLD VENIPUNCTURE: CPT | Mod: ZL

## 2024-10-08 PROCEDURE — 85810 BLOOD VISCOSITY EXAMINATION: CPT | Mod: ZL

## 2024-10-08 PROCEDURE — 82784 ASSAY IGA/IGD/IGG/IGM EACH: CPT | Mod: ZL

## 2024-10-08 PROCEDURE — 86334 IMMUNOFIX E-PHORESIS SERUM: CPT | Mod: ZL | Performed by: PATHOLOGY

## 2024-10-08 PROCEDURE — 86334 IMMUNOFIX E-PHORESIS SERUM: CPT | Performed by: PATHOLOGY

## 2024-10-08 PROCEDURE — 84155 ASSAY OF PROTEIN SERUM: CPT | Mod: ZL

## 2024-10-08 PROCEDURE — 82232 ASSAY OF BETA-2 PROTEIN: CPT | Mod: ZL

## 2024-10-08 PROCEDURE — 83521 IG LIGHT CHAINS FREE EACH: CPT | Mod: ZL

## 2024-10-08 PROCEDURE — 84165 PROTEIN E-PHORESIS SERUM: CPT | Mod: 26 | Performed by: PATHOLOGY

## 2024-10-08 PROCEDURE — 84165 PROTEIN E-PHORESIS SERUM: CPT | Mod: ZL | Performed by: PATHOLOGY

## 2024-10-08 RX ORDER — DIPHENHYDRAMINE HCL 50 MG
50 CAPSULE ORAL ONCE
Status: CANCELLED | OUTPATIENT
Start: 2024-10-15

## 2024-10-08 RX ORDER — DIPHENHYDRAMINE HCL 50 MG
50 CAPSULE ORAL
Status: CANCELLED | OUTPATIENT
Start: 2024-10-15

## 2024-10-08 RX ORDER — ALBUTEROL SULFATE 0.83 MG/ML
2.5 SOLUTION RESPIRATORY (INHALATION)
Status: CANCELLED | OUTPATIENT
Start: 2024-10-15

## 2024-10-08 RX ORDER — DIPHENHYDRAMINE HYDROCHLORIDE 50 MG/ML
50 INJECTION INTRAMUSCULAR; INTRAVENOUS
Status: CANCELLED
Start: 2024-10-15

## 2024-10-08 RX ORDER — LORAZEPAM 2 MG/ML
0.5 INJECTION INTRAMUSCULAR EVERY 4 HOURS PRN
Status: CANCELLED | OUTPATIENT
Start: 2024-10-15

## 2024-10-08 RX ORDER — EPINEPHRINE 1 MG/ML
0.3 INJECTION, SOLUTION, CONCENTRATE INTRAVENOUS EVERY 5 MIN PRN
Status: CANCELLED | OUTPATIENT
Start: 2024-10-15

## 2024-10-08 RX ORDER — MEPERIDINE HYDROCHLORIDE 25 MG/ML
25 INJECTION INTRAMUSCULAR; INTRAVENOUS; SUBCUTANEOUS EVERY 30 MIN PRN
Status: CANCELLED | OUTPATIENT
Start: 2024-10-15

## 2024-10-08 RX ORDER — ACETAMINOPHEN 325 MG/1
650 TABLET ORAL ONCE
Status: CANCELLED | OUTPATIENT
Start: 2024-10-15

## 2024-10-08 RX ORDER — ACETAMINOPHEN 325 MG/1
650 TABLET ORAL
Status: CANCELLED | OUTPATIENT
Start: 2024-10-15

## 2024-10-08 RX ORDER — METHYLPREDNISOLONE SODIUM SUCCINATE 125 MG/2ML
125 INJECTION INTRAMUSCULAR; INTRAVENOUS
Status: CANCELLED
Start: 2024-10-15

## 2024-10-08 RX ORDER — DEXAMETHASONE 4 MG/1
20 TABLET ORAL ONCE
Status: CANCELLED | OUTPATIENT
Start: 2024-10-15

## 2024-10-08 RX ORDER — ALBUTEROL SULFATE 90 UG/1
1-2 INHALANT RESPIRATORY (INHALATION)
Status: CANCELLED
Start: 2024-10-15

## 2024-10-08 RX ORDER — MONTELUKAST SODIUM 10 MG/1
10 TABLET ORAL ONCE
Status: CANCELLED | OUTPATIENT
Start: 2024-10-14

## 2024-10-09 ENCOUNTER — TELEPHONE (OUTPATIENT)
Dept: ONCOLOGY | Facility: CLINIC | Age: 74
End: 2024-10-09
Payer: COMMERCIAL

## 2024-10-09 DIAGNOSIS — C90.00 MULTIPLE MYELOMA NOT HAVING ACHIEVED REMISSION (H): Primary | ICD-10-CM

## 2024-10-09 RX ORDER — DEXAMETHASONE 4 MG/1
TABLET ORAL
Qty: 15 TABLET | Refills: 0 | Status: SHIPPED | OUTPATIENT
Start: 2024-10-14 | End: 2024-11-11

## 2024-10-09 NOTE — TELEPHONE ENCOUNTER
Oral Chemotherapy Monitoring Program     Medication: Pomalyst  Rx: 1mg PO daily on days 1 through 21 of 28 day cycle   REMS AUTH [id number]  Routine survey questions reviewed  Rx to be Escribed to SP

## 2024-10-10 LAB
ALBUMIN SERPL ELPH-MCNC: 3.9 G/DL (ref 3.7–5.1)
ALPHA1 GLOB SERPL ELPH-MCNC: 0.3 G/DL (ref 0.2–0.4)
ALPHA2 GLOB SERPL ELPH-MCNC: 0.7 G/DL (ref 0.5–0.9)
B-GLOBULIN SERPL ELPH-MCNC: 0.7 G/DL (ref 0.6–1)
B2 MICROGLOB TUMOR MARKER SER-MCNC: 1.8 MG/L
GAMMA GLOB SERPL ELPH-MCNC: 0.4 G/DL (ref 0.7–1.6)
IGA SERPL-MCNC: 28 MG/DL (ref 84–499)
IGG SERPL-MCNC: 448 MG/DL (ref 610–1616)
IGM SERPL-MCNC: 37 MG/DL (ref 35–242)
KAPPA LC FREE SER-MCNC: 0.8 MG/DL (ref 0.33–1.94)
KAPPA LC FREE/LAMBDA FREE SER NEPH: 1.11 {RATIO} (ref 0.26–1.65)
LAMBDA LC FREE SERPL-MCNC: 0.72 MG/DL (ref 0.57–2.63)
LOCATION OF TASK: NORMAL
M PROTEIN SERPL ELPH-MCNC: 0.1 G/DL
PROT PATTERN SERPL ELPH-IMP: ABNORMAL
PROT PATTERN SERPL IFE-IMP: NORMAL
VISC SER: 1.4 CP (ref 1.4–1.8)

## 2024-10-14 ENCOUNTER — LAB (OUTPATIENT)
Dept: LAB | Facility: OTHER | Age: 74
End: 2024-10-14
Attending: NURSE PRACTITIONER
Payer: MEDICARE

## 2024-10-14 ENCOUNTER — INFUSION THERAPY VISIT (OUTPATIENT)
Dept: INFUSION THERAPY | Facility: OTHER | Age: 74
End: 2024-10-14
Attending: INTERNAL MEDICINE
Payer: MEDICARE

## 2024-10-14 ENCOUNTER — ONCOLOGY VISIT (OUTPATIENT)
Dept: ONCOLOGY | Facility: OTHER | Age: 74
End: 2024-10-14
Attending: NURSE PRACTITIONER
Payer: MEDICARE

## 2024-10-14 VITALS
OXYGEN SATURATION: 98 % | HEIGHT: 63 IN | DIASTOLIC BLOOD PRESSURE: 70 MMHG | TEMPERATURE: 97.8 F | HEART RATE: 77 BPM | SYSTOLIC BLOOD PRESSURE: 122 MMHG | BODY MASS INDEX: 31.13 KG/M2 | WEIGHT: 175.71 LBS

## 2024-10-14 DIAGNOSIS — C90.00 MULTIPLE MYELOMA NOT HAVING ACHIEVED REMISSION (H): ICD-10-CM

## 2024-10-14 DIAGNOSIS — C90.00 MULTIPLE MYELOMA NOT HAVING ACHIEVED REMISSION (H): Primary | ICD-10-CM

## 2024-10-14 LAB
ALBUMIN SERPL BCG-MCNC: 4.1 G/DL (ref 3.5–5.2)
ALP SERPL-CCNC: 149 U/L (ref 40–150)
ALT SERPL W P-5'-P-CCNC: 16 U/L (ref 0–50)
ANION GAP SERPL CALCULATED.3IONS-SCNC: 13 MMOL/L (ref 7–15)
AST SERPL W P-5'-P-CCNC: 13 U/L (ref 0–45)
BASOPHILS # BLD AUTO: 0.1 10E3/UL (ref 0–0.2)
BASOPHILS NFR BLD AUTO: 2 %
BILIRUB SERPL-MCNC: 0.3 MG/DL
BUN SERPL-MCNC: 15.3 MG/DL (ref 8–23)
CALCIUM SERPL-MCNC: 9.4 MG/DL (ref 8.8–10.4)
CHLORIDE SERPL-SCNC: 103 MMOL/L (ref 98–107)
CREAT SERPL-MCNC: 0.79 MG/DL (ref 0.51–0.95)
EGFRCR SERPLBLD CKD-EPI 2021: 77 ML/MIN/1.73M2
EOSINOPHIL # BLD AUTO: 0.3 10E3/UL (ref 0–0.7)
EOSINOPHIL NFR BLD AUTO: 5 %
ERYTHROCYTE [DISTWIDTH] IN BLOOD BY AUTOMATED COUNT: 16 % (ref 10–15)
GLUCOSE SERPL-MCNC: 100 MG/DL (ref 70–99)
HCO3 SERPL-SCNC: 24 MMOL/L (ref 22–29)
HCT VFR BLD AUTO: 38.7 % (ref 35–47)
HGB BLD-MCNC: 12.8 G/DL (ref 11.7–15.7)
IMM GRANULOCYTES # BLD: 0 10E3/UL
IMM GRANULOCYTES NFR BLD: 1 %
LYMPHOCYTES # BLD AUTO: 2.3 10E3/UL (ref 0.8–5.3)
LYMPHOCYTES NFR BLD AUTO: 39 %
MCH RBC QN AUTO: 29.8 PG (ref 26.5–33)
MCHC RBC AUTO-ENTMCNC: 33.1 G/DL (ref 31.5–36.5)
MCV RBC AUTO: 90 FL (ref 78–100)
MONOCYTES # BLD AUTO: 0.8 10E3/UL (ref 0–1.3)
MONOCYTES NFR BLD AUTO: 13 %
NEUTROPHILS # BLD AUTO: 2.4 10E3/UL (ref 1.6–8.3)
NEUTROPHILS NFR BLD AUTO: 41 %
NRBC # BLD AUTO: 0 10E3/UL
NRBC BLD AUTO-RTO: 0 /100
PLATELET # BLD AUTO: 226 10E3/UL (ref 150–450)
POTASSIUM SERPL-SCNC: 4.2 MMOL/L (ref 3.4–5.3)
PROT SERPL-MCNC: 6.3 G/DL (ref 6.4–8.3)
RBC # BLD AUTO: 4.3 10E6/UL (ref 3.8–5.2)
SODIUM SERPL-SCNC: 140 MMOL/L (ref 135–145)
WBC # BLD AUTO: 5.8 10E3/UL (ref 4–11)

## 2024-10-14 PROCEDURE — 99214 OFFICE O/P EST MOD 30 MIN: CPT | Performed by: NURSE PRACTITIONER

## 2024-10-14 PROCEDURE — 96365 THER/PROPH/DIAG IV INF INIT: CPT

## 2024-10-14 PROCEDURE — G0463 HOSPITAL OUTPT CLINIC VISIT: HCPCS

## 2024-10-14 PROCEDURE — G0463 HOSPITAL OUTPT CLINIC VISIT: HCPCS | Mod: 25

## 2024-10-14 PROCEDURE — 84155 ASSAY OF PROTEIN SERUM: CPT | Mod: ZL

## 2024-10-14 PROCEDURE — 82947 ASSAY GLUCOSE BLOOD QUANT: CPT | Mod: ZL

## 2024-10-14 PROCEDURE — 250N000011 HC RX IP 250 OP 636: Mod: JZ | Performed by: NURSE PRACTITIONER

## 2024-10-14 PROCEDURE — 85025 COMPLETE CBC W/AUTO DIFF WBC: CPT | Mod: ZL

## 2024-10-14 PROCEDURE — 96402 CHEMO HORMON ANTINEOPL SQ/IM: CPT

## 2024-10-14 PROCEDURE — G2211 COMPLEX E/M VISIT ADD ON: HCPCS | Performed by: NURSE PRACTITIONER

## 2024-10-14 PROCEDURE — 258N000003 HC RX IP 258 OP 636: Performed by: NURSE PRACTITIONER

## 2024-10-14 PROCEDURE — 36415 COLL VENOUS BLD VENIPUNCTURE: CPT | Mod: ZL

## 2024-10-14 RX ORDER — MONTELUKAST SODIUM 10 MG/1
10 TABLET ORAL ONCE
Status: COMPLETED | OUTPATIENT
Start: 2024-10-14 | End: 2024-10-14

## 2024-10-14 RX ORDER — ZOLEDRONIC ACID 0.04 MG/ML
4 INJECTION, SOLUTION INTRAVENOUS ONCE
OUTPATIENT
Start: 2024-10-14 | End: 2024-10-14

## 2024-10-14 RX ORDER — ACETAMINOPHEN 325 MG/1
650 TABLET ORAL ONCE
Status: COMPLETED | OUTPATIENT
Start: 2024-10-14 | End: 2024-10-14

## 2024-10-14 RX ORDER — DEXAMETHASONE 4 MG/1
20 TABLET ORAL ONCE
Status: COMPLETED | OUTPATIENT
Start: 2024-10-14 | End: 2024-10-14

## 2024-10-14 RX ORDER — DIPHENHYDRAMINE HCL 50 MG
50 CAPSULE ORAL ONCE
Status: COMPLETED | OUTPATIENT
Start: 2024-10-14 | End: 2024-10-14

## 2024-10-14 RX ORDER — ZOLEDRONIC ACID 0.04 MG/ML
4 INJECTION, SOLUTION INTRAVENOUS ONCE
Status: COMPLETED | OUTPATIENT
Start: 2024-10-14 | End: 2024-10-14

## 2024-10-14 RX ADMIN — DEXAMETHASONE 20 MG: 4 TABLET ORAL at 12:18

## 2024-10-14 RX ADMIN — Medication 50 MG: at 12:19

## 2024-10-14 RX ADMIN — MONTELUKAST SODIUM 10 MG: 10 TABLET ORAL at 12:19

## 2024-10-14 RX ADMIN — SODIUM CHLORIDE 250 ML: 9 INJECTION, SOLUTION INTRAVENOUS at 12:20

## 2024-10-14 RX ADMIN — ZOLEDRONIC ACID 4 MG: 0.04 INJECTION, SOLUTION INTRAVENOUS at 12:20

## 2024-10-14 RX ADMIN — DARATUMUMAB AND HYALURONIDASE-FIHJ (HUMAN RECOMBINANT) 1800 MG: 1800; 30000 INJECTION SUBCUTANEOUS at 12:43

## 2024-10-14 RX ADMIN — ACETAMINOPHEN 650 MG: 325 TABLET ORAL at 12:19

## 2024-10-14 ASSESSMENT — PAIN SCALES - GENERAL: PAINLEVEL: NO PAIN (0)

## 2024-10-14 NOTE — PROGRESS NOTES
Oncology Follow-up Visit    Reason for Visit:  Carmelita is a 73 year old woman with a diagnosis of multiple myeloma, who I am visiting with today for routine follow-up and consideration of ongoing therapy.     Nursing Note and documentation reviewed: Yes    Interval History:   Doing well. No concerns today. Gets occasionally fatigued at times but nothing that a little nap doesn't help with. No recent infections. NO fever, chills, chest pain, cough. Does have a little dyspnea with exertion, most often with stairs. No bleeding concerns. No nausea or vomiting and continues to eat well. No bowel concerns. Intermittnet rash on face but daily cetrizine helps. Overall, doing well.      Oncologic History  12/31/2018 Presented to the emergency room with vertigo and fatigue.  CT scan of the head was negative and subsequent stress test was negative.  05/03/2019 PCP ordered lab work and noted a total protein of 12.9.  SPEP at that time showed an M spike of 6.2 with a large monoclonal protein seen in the gamma fraction. Urine immunofixation showed a possible small protein band in the gamma fraction  05/31/2019 Evaluated by Dr. Walker with Medical Oncology with plan to rule out myeloma and obtain bone marrow aspiration biopsy as well as a metastatic bone survey along with additional labwork  06/18/2019 Underwent bone marrow aspiration and biopsy  06/24/2019 Seen again by Dr. Walker and CBC showed a hemoglobin of 9.3, M spike 7.3 with monoclonal IgG immunoglobulin of kappa light chain type; serum viscosity was 2.9; quantitative immunoglobulins showed an IgG of 8160, beta-2 microglobulin was 5.8, BUN was 21 with creatinine is 0.8 and total protein was 13.7.  Quantitative kappa/lambda free light chains showed an elevated ratio of 17.0 bone marrow aspiration biopsy showed plasma cell myeloma with approximately 80% plasma cells.  Immunofixation showed IgG kappa and flow cytometry revealed kappa monotypic plasma cells consistent  with clonal plasma cell neoplasm and FISH panel was pending at that time. It was felt she had at least stage II disease based on her beta-2 microglobulin and anemia. Plan was to treat with Velcade 1.3 mg per/m2 days 1, 4, 8 and 11/Decadron 40 mg on days 1, 8 and 15. Initiation of Revlimid with C2 at 25 mg daily days 1 through 14.  Plan was to also obtain an MRI of the lumbar spine to rule out lytic lesion at L3.  She was initiated on Zovirax 400 mg p.o. twice daily.  06/25/2019 C1 of chemotherapy initiated  07/01/2019 Note in chart regarding patient's large co-pay for the Revlimid and no plan at this point to initiate Revlimid and treat only with Velcade and Decadron per Dr. Walker  07/11/2019 MRI lumbar spine shows a pathologic superior endplate compression fracture at L3 without evidence of retropulsed fragment and innumerable enhancing lesions throughout the lumbar spine consistent with history of multiple myeloma.  09/10/2019 Increasing M-spike and kappa lambda ratio  10/01/2019 Initiation of CyBorD  11/17/2020 Plateau of M-spike at 1.2 after 36 cycles of CyBorD  12/01/2020 Initiation of Darzalex Faspro injection  03/23/2021 M-spike increased form 1.0 to 1.2 and velcade and dexamethasone added to plan  04/12/2022 Treatment HOLIDAY commenced with Mspike 0.3 after 22 cycles of treatent  05/27/2022 Received Evusheld while on treatment holiday.   07/18/2023 Mspike 0.8. Other labs stable. PET scan ordered.   08/02/2023 PET completed showing negative study. No evidence of mass or lymphadenopathy. No concerning hypermetabolism is present.  08/14/2023 Met with Dr. Walker. Given biochemical progression, he did feel appropriate to resume therapy again with Darzalex faspro  03/12/2023 Following C7, mspike magy from 0.2-->0.8. PET ordered which was negative. Again increased frequency of Darzalex with anticipation of adding in Pomalyst with C2.    04/29/2024 Addition of Pomalyst with C2    Current Chemo Regime/TX:  "Darzalex faspro injection 1800mg subcutaneous per protocol and Pomalyst 1 mg days 1-21 of 28 day cycle given with weekly Dex  Current Cycle: C8   Completed cycles: 7  **Pom added C2 (4/29)     Previous treatment:     Velcade 1.3 mg/m2 days 1, 4, 8 and 11 with Decadron 40 mg days 1, 8 and 15 x 4 cycles;     Velcade 1.5mg.m2, cyclophosphamide 150mg every 7 days on days 1,8,15 and 22 with decadron 40mg days 1,8,15,22  Darzalex faspro injection 1800mg subcutaneous per protocol    Past Medical History:   Diagnosis Date    Arthritis     Cyst of breast     Depressive disorder     Diabetes mellitus, type 2 (H) 01/18/2021    Essential hypertension 10/01/2015    Major depressive disorder, recurrent episode, mild (H) 10/01/2015    Mixed hyperlipidemia 10/01/2015    Multiple myeloma not having achieved remission (H) 06/24/2019    Other specified disorders of bladder 07/09/2012    Irritable Bladder    Seasonal allergies 10/01/2015    Unspecified essential hypertension 03/19/2007    Unspecified sinusitis (chronic) 09/05/2007       Past Surgical History:   Procedure Laterality Date    APPENDECTOMY      BONE MARROW BIOPSY, BONE SPECIMEN, NEEDLE/TROCAR N/A 06/18/2019    Procedure: BONE MARROW BIOPSY;  Surgeon: Maciej Sanz MD;  Location: [facility] OR    CHOLECYSTECTOMY      COLONOSCOPY  07/18/2006    repeat 10 years    COLONOSCOPY N/A 12/30/2016    Procedure: COLONOSCOPY;  Surgeon: Bhaskar Franklin DO;  Location: [facility] OR    PUNC/ASPIR BREAST CYST Left 1995    benign    SINUS SURGERY      TUBAL STERILIZATION         Family History   Problem Relation Age of Onset    Breast Cancer Mother 60        Cause of Death; 1 breast    Parkinsonism Father         \"Possible\"    Coronary Artery Disease Father     Pacemaker Father     Thyroid Disease Daughter     Breast Cancer Maternal Aunt         over 50    Diabetes No family hx of     Hypertension No family hx of     Hyperlipidemia No family hx of     Cerebrovascular Disease No family hx of     " Colon Cancer No family hx of     Prostate Cancer No family hx of     Genetic Disorder No family hx of     Asthma No family hx of     Anesthesia Reaction No family hx of        Social History     Socioeconomic History    Marital status:      Spouse name: Not on file    Number of children: Not on file    Years of education: Not on file    Highest education level: Not on file   Occupational History    Occupation: Financial     Comment:  - (FT)   Tobacco Use    Smoking status: Never    Smokeless tobacco: Never    Tobacco comments:     Tried to Quit (YES); QUIT in 1971; Passive Exposure (NO)   Vaping Use    Vaping status: Never Used   Substance and Sexual Activity    Alcohol use: Yes     Comment: RARELY    Drug use: No    Sexual activity: Yes     Partners: Male     Birth control/protection: None   Other Topics Concern     Service Not Asked    Blood Transfusions Not Asked    Caffeine Concern Yes     Comment: Coffee >6 cups daily    Occupational Exposure Not Asked    Hobby Hazards Not Asked    Sleep Concern Not Asked    Stress Concern Not Asked    Weight Concern Not Asked    Special Diet Not Asked    Back Care Not Asked    Exercise Not Asked    Bike Helmet Not Asked    Seat Belt Not Asked    Self-Exams Not Asked    Parent/sibling w/ CABG, MI or angioplasty before 65F 55M? No   Social History Narrative    Not on file     Social Determinants of Health     Financial Resource Strain: Low Risk  (11/27/2023)    Financial Resource Strain     Within the past 12 months, have you or your family members you live with been unable to get utilities (heat, electricity) when it was really needed?: No   Food Insecurity: Low Risk  (11/27/2023)    Food Insecurity     Within the past 12 months, did you worry that your food would run out before you got money to buy more?: No     Within the past 12 months, did the food you bought just not last and you didn t have money to get more?: No   Transportation Needs: Low  Risk  (11/27/2023)    Transportation Needs     Within the past 12 months, has lack of transportation kept you from medical appointments, getting your medicines, non-medical meetings or appointments, work, or from getting things that you need?: No   Physical Activity: Not on file   Stress: Not on file   Social Connections: Not on file   Interpersonal Safety: Low Risk  (10/14/2024)    Interpersonal Safety     Do you feel physically and emotionally safe where you currently live?: Yes     Within the past 12 months, have you been hit, slapped, kicked or otherwise physically hurt by someone?: No     Within the past 12 months, have you been humiliated or emotionally abused in other ways by your partner or ex-partner?: No   Housing Stability: Low Risk  (11/27/2023)    Housing Stability     Do you have housing? : Yes     Are you worried about losing your housing?: No       Current Outpatient Medications   Medication Sig Dispense Refill    acyclovir (ZOVIRAX) 400 MG tablet Take 1 tablet (400 mg) by mouth 2 times daily Start 1 week prior to daratumumab and continue for 3 months after daratumumab treatment complete. 60 tablet 11    aspirin (ASA) 81 MG chewable tablet Take 81 mg by mouth daily      atorvastatin (LIPITOR) 20 MG tablet TAKE 1 TABLET BY MOUTH DAILY 90 tablet 0    cetirizine (ZYRTEC) 10 MG tablet Take 1 tablet (10 mg) by mouth daily 30 tablet 1    dexAMETHasone (DECADRON) 4 MG tablet Take 5 tablets (20 mg) on Days 8, 15, and 22. 15 tablet 0    dexAMETHasone (DECADRON) 4 MG tablet Take 5 tablets (20 mg) on Days 8, 15, and 22. 15 tablet 0    diphenhydrAMINE (BENADRYL) 25 MG capsule Take 25 mg by mouth daily      fluticasone (FLONASE) 50 MCG/ACT nasal spray SPRAY 2 SPRAYS INTO BOTH NOSTRILS DAILY 48 mL 0    hydrocortisone (CORTAID) 1 % external cream Apply topically 2 times daily sparingly over rash to face and chest 45 g 0    losartan (COZAAR) 50 MG tablet TAKE 1 TABLET BY MOUTH DAILY 90 tablet 3    pomalidomide  (POMALYST) 1 MG capsule Take 1 capsule (1 mg) by mouth daily for 21 days Day 1 thru 21 of each 28 day cycle. Swallow whole with water. 21 capsule 0    sertraline (ZOLOFT) 50 MG tablet TAKE 1/2 TABLET BY MOUTH DAILY 30 tablet 4    L-Lysine HCl 500 MG CAPS Take by mouth daily (Patient not taking: Reported on 9/17/2024)      prochlorperazine (COMPAZINE) 10 MG tablet Take 1 tablet (10 mg) by mouth every 6 hours as needed for nausea or vomiting (Patient not taking: Reported on 9/17/2024) 30 tablet 2     No current facility-administered medications for this visit.     Facility-Administered Medications Ordered in Other Visits   Medication Dose Route Frequency Provider Last Rate Last Admin    acetaminophen (TYLENOL) tablet 650 mg  650 mg Oral Once Nida Gonzalez APRN CNP        daratumumab-hyaluronidase-fihj (DARZALEX FASPRO) SUBCUTANEOUS injection 1,800 mg  1,800 mg Subcutaneous Once Nida Gonzalez APRN CNP        dexAMETHasone (DECADRON) tablet 20 mg  20 mg Oral Once Nida Gonzalez APRN CNP        diphenhydrAMINE (BENADRYL) capsule 50 mg  50 mg Oral Once Nida Gonzalez APRN CNP        montelukast (SINGULAIR) tablet 10 mg  10 mg Oral Once Nida Gonzalez APRN CNP        sodium chloride 0.9% BOLUS 250 mL  250 mL Intravenous Once Nida Gonzalez APRN CNP        zoledronic acid (ZOMETA) intermittent infusion 4 mg  4 mg Intravenous Once Nida Gonzalez APRN CNP            Allergies   Allergen Reactions    Lisinopril Cough    Phenylephrine Hcl Other (See Comments)     **Entex  HEADACHE (SEVERE)    Phenylpropanolamine Other (See Comments)     **Entex  HEADACHE (SEVERE)    Pseudoephedrine Tannate Other (See Comments)     **Entex  HEADACHE (SEVERE)    Levofloxacin Rash     **Levaquin    Moxifloxacin Hcl [Moxifloxacin Hcl In Nacl] Rash       Review Of Systems:  A complete review of systems is negative except for the above mentioned items in the interval history.     ECOG  "PS: 0    Physical Exam:  /70 (BP Location: Right arm, Patient Position: Sitting, Cuff Size: Adult Regular)   Pulse 77   Temp 97.8  F (36.6  C) (Tympanic)   Ht 1.6 m (5' 3\")   Wt 79.7 kg (175 lb 11.3 oz)   SpO2 98%   BMI 31.13 kg/m    GENERAL APPEARANCE: Alert and in no acute distress.  HEENT: Eyes appear normal without scleral icterus. Extraocular movements intact.   NECK:   Supple with normal range of motion. No asymmetry or masses. She does have some bilateral fullness without obvious mass.   LYMPHATICS: No palpable cervical, supraclavicular nodes.  RESP: Lungs clear to auscultation bilaterally, respirations regular and easy.  CARDIOVASCULAR: Regular rate and rhythm. Normal S1, S2; no murmur, gallop, or rub.  ABDOMEN: Soft, non-tender, non-distended. No palpable organomegaly or masses.  MUSCULOSKELETAL: Extremities without gross deformities noted. No edema of bilateral lower extremities.  SKIN: No suspicious lesions or rashes.  NEURO: Alert and oriented x 3.  Gait steady.  PSYCHIATRIC: Mentation and affect appear normal.  Mood appropriate.    Laboratory:  Results for orders placed or performed in visit on 10/14/24   Comprehensive metabolic panel     Status: Abnormal   Result Value Ref Range    Sodium 140 135 - 145 mmol/L    Potassium 4.2 3.4 - 5.3 mmol/L    Carbon Dioxide (CO2) 24 22 - 29 mmol/L    Anion Gap 13 7 - 15 mmol/L    Urea Nitrogen 15.3 8.0 - 23.0 mg/dL    Creatinine 0.79 0.51 - 0.95 mg/dL    GFR Estimate 77 >60 mL/min/1.73m2    Calcium 9.4 8.8 - 10.4 mg/dL    Chloride 103 98 - 107 mmol/L    Glucose 100 (H) 70 - 99 mg/dL    Alkaline Phosphatase 149 40 - 150 U/L    AST 13 0 - 45 U/L    ALT 16 0 - 50 U/L    Protein Total 6.3 (L) 6.4 - 8.3 g/dL    Albumin 4.1 3.5 - 5.2 g/dL    Bilirubin Total 0.3 <=1.2 mg/dL   CBC with platelets and differential     Status: Abnormal   Result Value Ref Range    WBC Count 5.8 4.0 - 11.0 10e3/uL    RBC Count 4.30 3.80 - 5.20 10e6/uL    Hemoglobin 12.8 11.7 - 15.7 " g/dL    Hematocrit 38.7 35.0 - 47.0 %    MCV 90 78 - 100 fL    MCH 29.8 26.5 - 33.0 pg    MCHC 33.1 31.5 - 36.5 g/dL    RDW 16.0 (H) 10.0 - 15.0 %    Platelet Count 226 150 - 450 10e3/uL    % Neutrophils 41 %    % Lymphocytes 39 %    % Monocytes 13 %    % Eosinophils 5 %    % Basophils 2 %    % Immature Granulocytes 1 %    NRBCs per 100 WBC 0 <1 /100    Absolute Neutrophils 2.4 1.6 - 8.3 10e3/uL    Absolute Lymphocytes 2.3 0.8 - 5.3 10e3/uL    Absolute Monocytes 0.8 0.0 - 1.3 10e3/uL    Absolute Eosinophils 0.3 0.0 - 0.7 10e3/uL    Absolute Basophils 0.1 0.0 - 0.2 10e3/uL    Absolute Immature Granulocytes 0.0 <=0.4 10e3/uL    Absolute NRBCs 0.0 10e3/uL   CBC with Platelets & Differential     Status: Abnormal    Narrative    The following orders were created for panel order CBC with Platelets & Differential.  Procedure                               Abnormality         Status                     ---------                               -----------         ------                     CBC with platelets and d...[[id number]]  Abnormal            Final result                 Please view results for these tests on the individual orders.     Component      Latest Ref Rng 9/10/2024  11:34 AM   Albumin Fraction      3.7 - 5.1 g/dL 3.9    Alpha 1 Fraction      0.2 - 0.4 g/dL 0.3    Alpha 2 Fraction      0.5 - 0.9 g/dL 0.8    Beta Fraction      0.6 - 1.0 g/dL 0.7    Gamma Fraction      0.7 - 1.6 g/dL 0.5 (L)    Monoclonal Peak      <=0.0 g/dL 0.1 (H)    ELP Interpretation: Very small monoclonal protein (0.1 g/dL) seen in the gamma fraction. See immunofixation report on same specimen. Hypogammaglobulinemia. Pathologic significance requires clinical correlation. Clair Lyons M.D., Ph.D.    IGG      610 - 1,616 mg/dL 439 (L)    IGA      84 - 499 mg/dL 27 (L)    IGM      35 - 242 mg/dL 45    Roberts Free Lt Chain      0.33 - 1.94 mg/dL 0.43    Lambda Free Lt Chain      0.57 - 2.63 mg/dL 0.34 (L)    Kappa Lambda Ratio      0.26 - 1.65   1.26    Beta-2-Microglobulin      <2.3 mg/L 1.8    Viscosity Index      1.4 - 1.8  1.5    Immunofixation ELP Monoclonal IgG immunoglobulin of kappa light chain type. Monoclonal antibody therapeutics (e.g. Daratumumab) may appear as monoclonal proteins on serum electrophoresis and immunofixation. Results should be interpreted with caution. Pathologic significance requires clinical correlation.  Clair Lyons M.D., Ph.D.    Total Protein Serum for ELP      6.4 - 8.3 g/dL 6.2 (L)       Legend:  (L) Low  (H) High      Imaging Studies:  None this visit.     ASSESSMENT/PLAN:  #1 Multiple myeloma IgG kappa multiple myeloma with 80% involvement of the bone marrow diagnosed June 2019 initially treated with Velcade and Decadron and received 4 cycles with increasing M-spike and kappa/lambda ratio.  Treatment changed to CyBorD on 10/1/2019.  M-spike plateau at 1.2 and treatment changed to monotherapy with Darzalex Faspro injection. M spike declined to 1.0 then increased again to 1.2 so Velcade and Dex added to her treatment plan with cycle 5 therapy. Following C22 (April 2022), she did initiate a treatment holiday when her Mspike was 0.3.     We had followed her counts for over a year. Unfortunately, more recently, she did see a rise in her mspike to 0.8. PET completed was negative. Dr. Walker felt this was a biochemical progression and after discussion with patient, elected to resume treatment with Darzalex faspro. Following 8 cycles, mspike began to rise. PET was negative but feeling like she wasn't responding to Darzalex alone, decided to add in Pomalyst 1 mg p.o. daily on days 1 through 21 to begin with cycle 2 in addition to dexamethasone 20 mg p.o. on days 1 ,8,15,22.    Today, she returns for C8 Darzalex with Pomalyst along with weekly Dexamethasone. She is due to commence Pom and Dex tomorrow. Her mspike remains stable at 0.1. She is tolerating therapy with modest side effects. No contraindications to proceeding with  therapy. Labs in 3 weeks with follow-up in 4 with planned Darzalex afterwards. Sooner with any concerns. She is requesting to see Dr. Walker soon to discuss if she has to stay on Pom.     #2 Lytic lesions Hx lytic lesion at L3. Zometa today. Q3M. She remains on calcium with vitamin D twice a day. Weight bearing activity.          Patient in agreement with plan and verbalizes understanding. Agrees to call with any questions or concerns.      35 minutes spent in the patient's encounter today with time spent in review of patient's chart along with chart preparation and review of the treatment plan and signing of treatment plan.  Time was also spent with the patient in obtaining a review of systems and performing a physical exam along with detailed review of all test results. Time was also spent in discussing plan for future follow-up and relating instructions for follow-up and in placing future orders.    The longitudinal plan of care for the diagnosis(es)/condition(s) as documented were addressed during this visit. Due to the added complexity in care, I will continue to support Carmelita in the subsequent management and with ongoing continuity of care.        FLO Duque, FNP-C   [facility]

## 2024-10-14 NOTE — PROGRESS NOTES
Infusion Nursing Note:  Carmelita Watts presents today for Zometa and Faspro.    Patient seen by provider today: Yes: Nida Gonzalez.   present during visit today: Not Applicable.    Note:   Component      Latest Ref Rng 10/14/2024  10:05 AM   WBC      4.0 - 11.0 10e3/uL 5.8    RBC Count      3.80 - 5.20 10e6/uL 4.30    Hemoglobin      11.7 - 15.7 g/dL 12.8    Hematocrit      35.0 - 47.0 % 38.7    MCV      78 - 100 fL 90    MCH      26.5 - 33.0 pg 29.8    MCHC      31.5 - 36.5 g/dL 33.1    RDW      10.0 - 15.0 % 16.0 (H)    Platelet Count      150 - 450 10e3/uL 226    % Neutrophils      % 41    % Lymphocytes      % 39    % Monocytes      % 13    % Eosinophils      % 5    % Basophils      % 2    % Immature Granulocytes      % 1    NRBCs per 100 WBC      <1 /100 0    Absolute Neutrophils      1.6 - 8.3 10e3/uL 2.4    Absolute Lymphocytes      0.8 - 5.3 10e3/uL 2.3    Absolute Monocytes      0.0 - 1.3 10e3/uL 0.8    Absolute Eosinophils      0.0 - 0.7 10e3/uL 0.3    Absolute Basophils      0.0 - 0.2 10e3/uL 0.1    Absolute Immature Granulocytes      <=0.4 10e3/uL 0.0    Absolute NRBCs      10e3/uL 0.0    Sodium      135 - 145 mmol/L 140    Potassium      3.4 - 5.3 mmol/L 4.2    Carbon Dioxide (CO2)      22 - 29 mmol/L 24    Anion Gap      7 - 15 mmol/L 13    Urea Nitrogen      8.0 - 23.0 mg/dL 15.3    Creatinine      0.51 - 0.95 mg/dL 0.79    GFR Estimate      >60 mL/min/1.73m2 77    Calcium      8.8 - 10.4 mg/dL 9.4    Chloride      98 - 107 mmol/L 103    Glucose      70 - 99 mg/dL 100 (H)    Alkaline Phosphatase      40 - 150 U/L 149    AST      0 - 45 U/L 13    ALT      0 - 50 U/L 16    Protein Total      6.4 - 8.3 g/dL 6.3 (L)    Albumin      3.5 - 5.2 g/dL 4.1    Bilirubin Total      <=1.2 mg/dL 0.3       Legend:  (H) High  (L) Low.    Intravenous Access:  Peripheral IV placed.    Treatment Conditions:  Results reviewed, labs MET treatment parameters, ok to proceed with treatment.      Post  Infusion Assessment:  Patient tolerated infusion without incident.  Patient tolerated injection without incident. Faspro injected into right side subcut abdomen.  Blood return noted pre and post infusion.  No evidence of extravasations.       Discharge Plan:   Patient and/or family verbalized understanding of discharge instructions and all questions answered. She states that she will call later in the week for future appointments as they were not ready prior to discharge.

## 2024-10-14 NOTE — NURSING NOTE
"Oncology Rooming Note    October 14, 2024 10:37 AM   Carmelita Watts is a 74 year old female who presents for:    Chief Complaint   Patient presents with    Oncology Clinic Visit     Follow up. Multiple myeloma not having achieved remission      Initial Vitals: /70 (BP Location: Right arm, Patient Position: Sitting, Cuff Size: Adult Regular)   Pulse 77   Temp 97.8  F (36.6  C) (Tympanic)   Ht 1.6 m (5' 3\")   Wt 79.7 kg (175 lb 11.3 oz)   SpO2 98%   BMI 31.13 kg/m   Estimated body mass index is 31.13 kg/m  as calculated from the following:    Height as of this encounter: 1.6 m (5' 3\").    Weight as of this encounter: 79.7 kg (175 lb 11.3 oz). Body surface area is 1.88 meters squared.  No Pain (0) Comment: Data Unavailable   No LMP recorded. Patient is postmenopausal.  Allergies reviewed: Yes  Medications reviewed: Yes    Medications: Medication refills not needed today.  Pharmacy name entered into [facility]:    [id number] - [place], [place] - [address] [facility] [address]  [facility] - [place], [place] - [address]    Frailty Screening:   Is the patient here for a new oncology consult visit in cancer care? 2. No      Clinical concerns: none       Julia See LPN              "

## 2024-10-15 NOTE — PROGRESS NOTES
Chief Complaint   Patient presents with    Ear Problem     Follow up ears       Patient returns to ENT.  Patient was last seen 4/16/2024 at that time was doing well.  Her ears were cleaned. She will use drops intermittently at times but it does help her symptoms.     Today, Carmelita has been doing well overall.   Restarted Chemotherapy yesterday following at 15 month break. She has felt well, fatigued but no concerns.   Hearing has been stable, hearing aids on daily basis.   She has felt better with right HA use, than left. No recent drainage.     Denies otalgia, otorrhea  Denies worrisome tinnitus  Denies fluctuating hearing loss or tinnitus.   Denies vertigo or facial paraesthesia.    Right tube has been absent and right TM intact without effusion.  Left TM does have small perforation noted.   BTTI placed on 9/20/19 by Dr. Ley in office          Past Medical History:   Diagnosis Date    Arthritis     Cyst of breast     Depressive disorder     Diabetes mellitus, type 2 (H) 01/18/2021    Essential hypertension 10/01/2015    Major depressive disorder, recurrent episode, mild (H) 10/01/2015    Mixed hyperlipidemia 10/01/2015    Multiple myeloma not having achieved remission (H) 06/24/2019    Other specified disorders of bladder 07/09/2012    Irritable Bladder    Seasonal allergies 10/01/2015    Unspecified essential hypertension 03/19/2007    Unspecified sinusitis (chronic) 09/05/2007        Allergies   Allergen Reactions    Lisinopril Cough    Phenylephrine Hcl Other (See Comments)     **Entex  HEADACHE (SEVERE)    Phenylpropanolamine Other (See Comments)     **Entex  HEADACHE (SEVERE)    Pseudoephedrine Tannate Other (See Comments)     **Entex  HEADACHE (SEVERE)    Levofloxacin Rash     **Levaquin    Moxifloxacin Hcl [Moxifloxacin Hcl In Nacl] Rash     Current Outpatient Medications   Medication Sig Dispense Refill    acyclovir (ZOVIRAX) 400 MG tablet Take 1 tablet (400 mg) by mouth 2 times daily Start 1 week  "prior to daratumumab and continue for 3 months after daratumumab treatment complete. 60 tablet 11    aspirin (ASA) 81 MG chewable tablet Take 81 mg by mouth daily      atorvastatin (LIPITOR) 20 MG tablet TAKE 1 TABLET BY MOUTH DAILY 90 tablet 0    cetirizine (ZYRTEC) 10 MG tablet Take 1 tablet (10 mg) by mouth daily 30 tablet 1    dexAMETHasone (DECADRON) 4 MG tablet Take 5 tablets (20 mg) on Days 8, 15, and 22. 15 tablet 0    dexAMETHasone (DECADRON) 4 MG tablet Take 5 tablets (20 mg) on Days 8, 15, and 22. 15 tablet 0    diphenhydrAMINE (BENADRYL) 25 MG capsule Take 25 mg by mouth daily      fluticasone (FLONASE) 50 MCG/ACT nasal spray SPRAY 2 SPRAYS INTO BOTH NOSTRILS DAILY 48 mL 0    hydrocortisone (CORTAID) 1 % external cream Apply topically 2 times daily sparingly over rash to face and chest 45 g 0    L-Lysine HCl 500 MG CAPS Take by mouth daily (Patient not taking: Reported on 9/17/2024)      losartan (COZAAR) 50 MG tablet TAKE 1 TABLET BY MOUTH DAILY 90 tablet 3    pomalidomide (POMALYST) 1 MG capsule Take 1 capsule (1 mg) by mouth daily for 21 days Day 1 thru 21 of each 28 day cycle. Swallow whole with water. 21 capsule 0    prochlorperazine (COMPAZINE) 10 MG tablet Take 1 tablet (10 mg) by mouth every 6 hours as needed for nausea or vomiting (Patient not taking: Reported on 9/17/2024) 30 tablet 2    sertraline (ZOLOFT) 50 MG tablet TAKE 1/2 TABLET BY MOUTH DAILY 30 tablet 4     No current facility-administered medications for this visit.     ROS- SEE HPI  /68 (BP Location: Right arm, Patient Position: Sitting, Cuff Size: Adult Large)   Temp (!) 96.6  F (35.9  C) (Tympanic)   Resp 18   Ht 1.6 m (5' 3\")   Wt 79.4 kg (175 lb)   BMI 31.00 kg/m      General - The patient is well nourished and well developed, and appears to have good nutritional status.    Head and Face - Normocephalic and atraumatic, with no gross asymmetry noted of the contour of the facial features.  The facial nerve is intact, " with strong symmetric movements.  Eyes - Extraocular movements intact, and the pupils were reactive to light.  Sclera were not icteric or injected, conjunctiva were pink and moist.  Mouth - Examination of the oral cavity shows pink, healthy, moist mucosa.  No lesions or ulceration noted.  The dentition are in good repair.  The tongue is mobile and midline.  Ears - Examination of the ears showed- Right canal clear. Right TM is intact without effusion or perforation. LEFT Ear examined under otologic microscopy.  There is dried debris along inferior floor of canal removed with alligator forceps. Scant otorrhea by TM.   Left TM is with perforation, central anterior about 15%. ME appears healthy   Eyes - Extraocular movements intact, and the pupils were reactive to light.  Sclera were not icteric or injected, conjunctiva were pink and moist.  Mouth - Examination of the oral cavity showed pink, healthy oral mucosa. No lesions or ulcerations noted.  The tongue was mobile and midline, and the dentition were in good condition.    Throat - The walls of the oropharynx were smooth, pink, moist, symmetric, and had no lesions or ulcerations.  The tonsillar pillars and soft palate were symmetric.  The uvula was midline on elevation.    Neck - Normal midline excursion of the laryngotracheal complex during swallowing.  Full range of motion on passive movement.  Palpation of the occipital, submental, submandibular, internal jugular chain, and supraclavicular nodes did not demonstrate any abnormal lymph nodes or masses.  Palpation of the thyroid was soft and smooth, with no nodules or goiter appreciated.  The trachea was mobile and midline.         ASSESSMENT/ PLAN:    ICD-10-CM    1. Perforation of tympanic membrane, left  H72.92 ofloxacin (FLOXIN) 0.3 % otic solution      2. H/O myringotomy  Z98.890 ofloxacin (FLOXIN) 0.3 % otic solution      3. Sensorineural hearing loss (SNHL) of right ear with restricted hearing of left ear   H90.A21 ofloxacin (FLOXIN) 0.3 % otic solution      4. Mixed conductive and sensorineural hearing loss of left ear with restricted hearing of right ear  H90.A32           Start Floxin 5 drops at night for 7 days to left ear. Small debris/ fluid removed.  Refilled Floxin drops as needed    Stable ear exam.   Left TM with stable perforation. Water precautions.     Follow up in 6 months        Bridgette Ly PA-C  ENT  [facility], [place]

## 2024-10-16 ENCOUNTER — OFFICE VISIT (OUTPATIENT)
Dept: OTOLARYNGOLOGY | Facility: OTHER | Age: 74
End: 2024-10-16
Attending: PHYSICIAN ASSISTANT
Payer: COMMERCIAL

## 2024-10-16 VITALS
RESPIRATION RATE: 18 BRPM | WEIGHT: 175 LBS | HEIGHT: 63 IN | TEMPERATURE: 96.8 F | SYSTOLIC BLOOD PRESSURE: 136 MMHG | BODY MASS INDEX: 31.01 KG/M2 | DIASTOLIC BLOOD PRESSURE: 68 MMHG

## 2024-10-16 DIAGNOSIS — H90.A21 SENSORINEURAL HEARING LOSS (SNHL) OF RIGHT EAR WITH RESTRICTED HEARING OF LEFT EAR: ICD-10-CM

## 2024-10-16 DIAGNOSIS — Z98.890 H/O MYRINGOTOMY: ICD-10-CM

## 2024-10-16 DIAGNOSIS — H72.92 PERFORATION OF TYMPANIC MEMBRANE, LEFT: Primary | ICD-10-CM

## 2024-10-16 DIAGNOSIS — H90.A32 MIXED CONDUCTIVE AND SENSORINEURAL HEARING LOSS OF LEFT EAR WITH RESTRICTED HEARING OF RIGHT EAR: ICD-10-CM

## 2024-10-16 PROCEDURE — 92504 EAR MICROSCOPY EXAMINATION: CPT | Performed by: PHYSICIAN ASSISTANT

## 2024-10-16 PROCEDURE — G0463 HOSPITAL OUTPT CLINIC VISIT: HCPCS

## 2024-10-16 PROCEDURE — 99213 OFFICE O/P EST LOW 20 MIN: CPT | Mod: 25 | Performed by: PHYSICIAN ASSISTANT

## 2024-10-16 RX ORDER — OFLOXACIN 3 MG/ML
5 SOLUTION AURICULAR (OTIC) 2 TIMES DAILY
Qty: 10 ML | Refills: 1 | Status: SHIPPED | OUTPATIENT
Start: 2024-10-16 | End: 2024-10-23

## 2024-10-16 ASSESSMENT — PAIN SCALES - GENERAL: PAINLEVEL: NO PAIN (0)

## 2024-10-16 NOTE — LETTER
10/16/2024      Carmelita Watts  [address] [facility] [address]      Dear Colleague,    Thank you for referring your patient, Carmelita Watts, to the [facility]. Please see a copy of my visit note below.    Chief Complaint   Patient presents with     Ear Problem     Follow up ears       Patient returns to ENT.  Patient was last seen 4/16/2024 at that time was doing well.  Her ears were cleaned. She will use drops intermittently at times but it does help her symptoms.     Today, Carmelita has been doing well overall.   Restarted Chemotherapy yesterday following at 15 month break. She has felt well, fatigued but no concerns.   Hearing has been stable, hearing aids on daily basis.   She has felt better with right HA use, than left. No recent drainage.     Denies otalgia, otorrhea  Denies worrisome tinnitus  Denies fluctuating hearing loss or tinnitus.   Denies vertigo or facial paraesthesia.    Right tube has been absent and right TM intact without effusion.  Left TM does have small perforation noted.   BTTI placed on 9/20/19 by Dr. Ley in office          Past Medical History:   Diagnosis Date     Arthritis      Cyst of breast      Depressive disorder      Diabetes mellitus, type 2 (H) 01/18/2021     Essential hypertension 10/01/2015     Major depressive disorder, recurrent episode, mild (H) 10/01/2015     Mixed hyperlipidemia 10/01/2015     Multiple myeloma not having achieved remission (H) 06/24/2019     Other specified disorders of bladder 07/09/2012    Irritable Bladder     Seasonal allergies 10/01/2015     Unspecified essential hypertension 03/19/2007     Unspecified sinusitis (chronic) 09/05/2007        Allergies   Allergen Reactions     Lisinopril Cough     Phenylephrine Hcl Other (See Comments)     **Entex  HEADACHE (SEVERE)     Phenylpropanolamine Other (See Comments)     **Entex  HEADACHE (SEVERE)     Pseudoephedrine Tannate Other (See Comments)     **Entex  HEADACHE (SEVERE)  "    Levofloxacin Rash     **Levaquin     Moxifloxacin Hcl [Moxifloxacin Hcl In Nacl] Rash     Current Outpatient Medications   Medication Sig Dispense Refill     acyclovir (ZOVIRAX) 400 MG tablet Take 1 tablet (400 mg) by mouth 2 times daily Start 1 week prior to daratumumab and continue for 3 months after daratumumab treatment complete. 60 tablet 11     aspirin (ASA) 81 MG chewable tablet Take 81 mg by mouth daily       atorvastatin (LIPITOR) 20 MG tablet TAKE 1 TABLET BY MOUTH DAILY 90 tablet 0     cetirizine (ZYRTEC) 10 MG tablet Take 1 tablet (10 mg) by mouth daily 30 tablet 1     dexAMETHasone (DECADRON) 4 MG tablet Take 5 tablets (20 mg) on Days 8, 15, and 22. 15 tablet 0     dexAMETHasone (DECADRON) 4 MG tablet Take 5 tablets (20 mg) on Days 8, 15, and 22. 15 tablet 0     diphenhydrAMINE (BENADRYL) 25 MG capsule Take 25 mg by mouth daily       fluticasone (FLONASE) 50 MCG/ACT nasal spray SPRAY 2 SPRAYS INTO BOTH NOSTRILS DAILY 48 mL 0     hydrocortisone (CORTAID) 1 % external cream Apply topically 2 times daily sparingly over rash to face and chest 45 g 0     L-Lysine HCl 500 MG CAPS Take by mouth daily (Patient not taking: Reported on 9/17/2024)       losartan (COZAAR) 50 MG tablet TAKE 1 TABLET BY MOUTH DAILY 90 tablet 3     pomalidomide (POMALYST) 1 MG capsule Take 1 capsule (1 mg) by mouth daily for 21 days Day 1 thru 21 of each 28 day cycle. Swallow whole with water. 21 capsule 0     prochlorperazine (COMPAZINE) 10 MG tablet Take 1 tablet (10 mg) by mouth every 6 hours as needed for nausea or vomiting (Patient not taking: Reported on 9/17/2024) 30 tablet 2     sertraline (ZOLOFT) 50 MG tablet TAKE 1/2 TABLET BY MOUTH DAILY 30 tablet 4     No current facility-administered medications for this visit.     ROS- SEE HPI  /68 (BP Location: Right arm, Patient Position: Sitting, Cuff Size: Adult Large)   Temp (!) 96.6  F (35.9  C) (Tympanic)   Resp 18   Ht 1.6 m (5' 3\")   Wt 79.4 kg (175 lb)   BMI " 31.00 kg/m      General - The patient is well nourished and well developed, and appears to have good nutritional status.    Head and Face - Normocephalic and atraumatic, with no gross asymmetry noted of the contour of the facial features.  The facial nerve is intact, with strong symmetric movements.  Eyes - Extraocular movements intact, and the pupils were reactive to light.  Sclera were not icteric or injected, conjunctiva were pink and moist.  Mouth - Examination of the oral cavity shows pink, healthy, moist mucosa.  No lesions or ulceration noted.  The dentition are in good repair.  The tongue is mobile and midline.  Ears - Examination of the ears showed- Right canal clear. Right TM is intact without effusion or perforation. LEFT Ear examined under otologic microscopy.  There is dried debris along inferior floor of canal removed with alligator forceps. Scant otorrhea by TM.   Left TM is with perforation, central anterior about 15%. ME appears healthy   Eyes - Extraocular movements intact, and the pupils were reactive to light.  Sclera were not icteric or injected, conjunctiva were pink and moist.  Mouth - Examination of the oral cavity showed pink, healthy oral mucosa. No lesions or ulcerations noted.  The tongue was mobile and midline, and the dentition were in good condition.    Throat - The walls of the oropharynx were smooth, pink, moist, symmetric, and had no lesions or ulcerations.  The tonsillar pillars and soft palate were symmetric.  The uvula was midline on elevation.    Neck - Normal midline excursion of the laryngotracheal complex during swallowing.  Full range of motion on passive movement.  Palpation of the occipital, submental, submandibular, internal jugular chain, and supraclavicular nodes did not demonstrate any abnormal lymph nodes or masses.  Palpation of the thyroid was soft and smooth, with no nodules or goiter appreciated.  The trachea was mobile and midline.         ASSESSMENT/ PLAN:     ICD-10-CM    1. Perforation of tympanic membrane, left  H72.92 ofloxacin (FLOXIN) 0.3 % otic solution      2. H/O myringotomy  Z98.890 ofloxacin (FLOXIN) 0.3 % otic solution      3. Sensorineural hearing loss (SNHL) of right ear with restricted hearing of left ear  H90.A21 ofloxacin (FLOXIN) 0.3 % otic solution      4. Mixed conductive and sensorineural hearing loss of left ear with restricted hearing of right ear  H90.A32           Start Floxin 5 drops at night for 7 days to left ear. Small debris/ fluid removed.  Refilled Floxin drops as needed    Stable ear exam.   Left TM with stable perforation. Water precautions.     Follow up in 6 months        Bridgette Ly PA-C  ENT  [facility], [place]        Again, thank you for allowing me to participate in the care of your patient.        Sincerely,        Bridgette Ly PA-C

## 2024-10-16 NOTE — PATIENT INSTRUCTIONS
Refilled Floxin drops as needed  Start Floxin 5 drops at night for 7 days to left ear. Small debris/ fluid removed.    Stable ear exam.   Left TM with stable perforation. Water precautions.     Follow up in 6 months    Thank you for allowing VENKATESH Purvis and our ENT team to participate in your care.  If your medications are too expensive, please give the nurse a call.  We can possibly change this medication.  If you have a scheduling or an appointment question please contact our Health Unit Coordinator at their direct line [phone].   ALL nursing questions or concerns can be directed to your ENT nurse, Manuela at: [phone].

## 2024-11-04 ENCOUNTER — LAB (OUTPATIENT)
Dept: LAB | Facility: OTHER | Age: 74
End: 2024-11-04
Payer: MEDICARE

## 2024-11-04 DIAGNOSIS — C90.00 MULTIPLE MYELOMA NOT HAVING ACHIEVED REMISSION (H): Primary | ICD-10-CM

## 2024-11-04 DIAGNOSIS — C90.00 MULTIPLE MYELOMA NOT HAVING ACHIEVED REMISSION (H): ICD-10-CM

## 2024-11-04 LAB — TOTAL PROTEIN SERUM FOR ELP: 6 G/DL (ref 6.4–8.3)

## 2024-11-04 PROCEDURE — 86334 IMMUNOFIX E-PHORESIS SERUM: CPT | Mod: ZL | Performed by: PATHOLOGY

## 2024-11-04 PROCEDURE — 85810 BLOOD VISCOSITY EXAMINATION: CPT | Mod: ZL

## 2024-11-04 PROCEDURE — 84165 PROTEIN E-PHORESIS SERUM: CPT | Mod: 26 | Performed by: PATHOLOGY

## 2024-11-04 PROCEDURE — 84155 ASSAY OF PROTEIN SERUM: CPT | Mod: ZL

## 2024-11-04 PROCEDURE — 83521 IG LIGHT CHAINS FREE EACH: CPT | Mod: ZL

## 2024-11-04 PROCEDURE — 82784 ASSAY IGA/IGD/IGG/IGM EACH: CPT | Mod: ZL

## 2024-11-04 PROCEDURE — 82232 ASSAY OF BETA-2 PROTEIN: CPT | Mod: ZL

## 2024-11-04 PROCEDURE — 86334 IMMUNOFIX E-PHORESIS SERUM: CPT | Performed by: PATHOLOGY

## 2024-11-04 PROCEDURE — 36415 COLL VENOUS BLD VENIPUNCTURE: CPT | Mod: ZL

## 2024-11-04 PROCEDURE — 84165 PROTEIN E-PHORESIS SERUM: CPT | Mod: ZL | Performed by: PATHOLOGY

## 2024-11-04 RX ORDER — DEXAMETHASONE 4 MG/1
TABLET ORAL
Qty: 15 TABLET | Refills: 0 | Status: SHIPPED | OUTPATIENT
Start: 2024-11-12

## 2024-11-05 ENCOUNTER — TELEPHONE (OUTPATIENT)
Dept: ONCOLOGY | Facility: CLINIC | Age: 74
End: 2024-11-05
Payer: COMMERCIAL

## 2024-11-05 LAB
ALBUMIN SERPL ELPH-MCNC: 3.8 G/DL (ref 3.7–5.1)
ALPHA1 GLOB SERPL ELPH-MCNC: 0.3 G/DL (ref 0.2–0.4)
ALPHA2 GLOB SERPL ELPH-MCNC: 0.8 G/DL (ref 0.5–0.9)
B-GLOBULIN SERPL ELPH-MCNC: 0.7 G/DL (ref 0.6–1)
B2 MICROGLOB TUMOR MARKER SER-MCNC: 2.1 MG/L
GAMMA GLOB SERPL ELPH-MCNC: 0.4 G/DL (ref 0.7–1.6)
IGA SERPL-MCNC: 36 MG/DL (ref 84–499)
IGG SERPL-MCNC: 425 MG/DL (ref 610–1616)
IGM SERPL-MCNC: 37 MG/DL (ref 35–242)
KAPPA LC FREE SER-MCNC: 0.68 MG/DL (ref 0.33–1.94)
KAPPA LC FREE/LAMBDA FREE SER NEPH: 0.99 {RATIO} (ref 0.26–1.65)
LAMBDA LC FREE SERPL-MCNC: 0.69 MG/DL (ref 0.57–2.63)
LOCATION OF TASK: ABNORMAL
LOCATION OF TASK: NORMAL
M PROTEIN SERPL ELPH-MCNC: 0.1 G/DL
PROT PATTERN SERPL ELPH-IMP: ABNORMAL
PROT PATTERN SERPL IFE-IMP: NORMAL
VISC SER: 1.6 CP (ref 1.4–1.8)

## 2024-11-05 NOTE — TELEPHONE ENCOUNTER
Oral Chemotherapy Monitoring Program     Medication: Pomalyst  Rx: 1mg PO daily on days 1 through 21 of 28 day cycle   REMS AUTH [id number]  Routine survey questions reviewed  Rx to be Escribed to [facility]

## 2024-11-08 DIAGNOSIS — C90.00 MULTIPLE MYELOMA NOT HAVING ACHIEVED REMISSION (H): ICD-10-CM

## 2024-11-08 DIAGNOSIS — F33.0 MAJOR DEPRESSIVE DISORDER, RECURRENT EPISODE, MILD (H): ICD-10-CM

## 2024-11-08 NOTE — TELEPHONE ENCOUNTER
SERTRALINE  50mg  TABLET       Last Written Prescription Date:  12/12/2023  Last Fill Quantity: 30,   # refills: 4  Last Office Visit: 11/27/2023  Future Office visit:    Next 5 appointments (look out 90 days)      Nov 11, 2024 2:40 PM  (Arrive by 2:25 PM)  Return Visit with FLO Robertson CNP  [facility] ([facility] ) [address] [facility] [address]  [phone]     Dec 09, 2024 3:00 PM  (Arrive by 2:45 PM)  Return Visit with Elías Walker MD  [facility] ([facility] ) [address] [facility] [address]  [phone]             Routing refill request to provider for review/approval because:    SSRIs Protocol [id number]/08/2024 08:25 AM   Protocol Details PHQ-9 score less than 5 in past 6 months        Kimberly Boecker, RN

## 2024-11-08 NOTE — TELEPHONE ENCOUNTER
Attempt # 1  Outcome: Left Message   Comment: LVM for patient to all to establish care with another provider.

## 2024-11-11 ENCOUNTER — LAB (OUTPATIENT)
Dept: LAB | Facility: OTHER | Age: 74
End: 2024-11-11
Attending: INTERNAL MEDICINE
Payer: MEDICARE

## 2024-11-11 ENCOUNTER — ONCOLOGY VISIT (OUTPATIENT)
Dept: ONCOLOGY | Facility: OTHER | Age: 74
End: 2024-11-11
Attending: INTERNAL MEDICINE
Payer: MEDICARE

## 2024-11-11 ENCOUNTER — INFUSION THERAPY VISIT (OUTPATIENT)
Dept: INFUSION THERAPY | Facility: OTHER | Age: 74
End: 2024-11-11
Attending: INTERNAL MEDICINE
Payer: MEDICARE

## 2024-11-11 VITALS
WEIGHT: 175.04 LBS | BODY MASS INDEX: 31.02 KG/M2 | DIASTOLIC BLOOD PRESSURE: 68 MMHG | OXYGEN SATURATION: 98 % | HEIGHT: 63 IN | TEMPERATURE: 97.9 F | SYSTOLIC BLOOD PRESSURE: 122 MMHG | HEART RATE: 79 BPM

## 2024-11-11 DIAGNOSIS — C90.00 MULTIPLE MYELOMA NOT HAVING ACHIEVED REMISSION (H): ICD-10-CM

## 2024-11-11 DIAGNOSIS — C90.00 MULTIPLE MYELOMA NOT HAVING ACHIEVED REMISSION (H): Primary | ICD-10-CM

## 2024-11-11 DIAGNOSIS — M89.8X9 BONE PAIN: ICD-10-CM

## 2024-11-11 DIAGNOSIS — M89.9 BONE LESION: ICD-10-CM

## 2024-11-11 LAB
ALBUMIN SERPL BCG-MCNC: 4 G/DL (ref 3.5–5.2)
ALP SERPL-CCNC: 128 U/L (ref 40–150)
ALT SERPL W P-5'-P-CCNC: 17 U/L (ref 0–50)
ANION GAP SERPL CALCULATED.3IONS-SCNC: 12 MMOL/L (ref 7–15)
AST SERPL W P-5'-P-CCNC: 16 U/L (ref 0–45)
BASOPHILS # BLD AUTO: 0.1 10E3/UL (ref 0–0.2)
BASOPHILS NFR BLD AUTO: 1 %
BILIRUB SERPL-MCNC: 0.3 MG/DL
BUN SERPL-MCNC: 13.5 MG/DL (ref 8–23)
CALCIUM SERPL-MCNC: 9.2 MG/DL (ref 8.8–10.4)
CHLORIDE SERPL-SCNC: 102 MMOL/L (ref 98–107)
CREAT SERPL-MCNC: 0.8 MG/DL (ref 0.51–0.95)
EGFRCR SERPLBLD CKD-EPI 2021: 76 ML/MIN/1.73M2
EOSINOPHIL # BLD AUTO: 0.4 10E3/UL (ref 0–0.7)
EOSINOPHIL NFR BLD AUTO: 6 %
ERYTHROCYTE [DISTWIDTH] IN BLOOD BY AUTOMATED COUNT: 16.4 % (ref 10–15)
GLUCOSE SERPL-MCNC: 163 MG/DL (ref 70–99)
HCO3 SERPL-SCNC: 24 MMOL/L (ref 22–29)
HCT VFR BLD AUTO: 38.9 % (ref 35–47)
HGB BLD-MCNC: 12.7 G/DL (ref 11.7–15.7)
IMM GRANULOCYTES # BLD: 0 10E3/UL
IMM GRANULOCYTES NFR BLD: 0 %
LYMPHOCYTES # BLD AUTO: 2.8 10E3/UL (ref 0.8–5.3)
LYMPHOCYTES NFR BLD AUTO: 46 %
MCH RBC QN AUTO: 29.7 PG (ref 26.5–33)
MCHC RBC AUTO-ENTMCNC: 32.6 G/DL (ref 31.5–36.5)
MCV RBC AUTO: 91 FL (ref 78–100)
MONOCYTES # BLD AUTO: 0.7 10E3/UL (ref 0–1.3)
MONOCYTES NFR BLD AUTO: 11 %
NEUTROPHILS # BLD AUTO: 2.2 10E3/UL (ref 1.6–8.3)
NEUTROPHILS NFR BLD AUTO: 36 %
NRBC # BLD AUTO: 0 10E3/UL
NRBC BLD AUTO-RTO: 0 /100
PLATELET # BLD AUTO: 197 10E3/UL (ref 150–450)
POTASSIUM SERPL-SCNC: 3.7 MMOL/L (ref 3.4–5.3)
PROT SERPL-MCNC: 6 G/DL (ref 6.4–8.3)
RBC # BLD AUTO: 4.28 10E6/UL (ref 3.8–5.2)
SODIUM SERPL-SCNC: 138 MMOL/L (ref 135–145)
WBC # BLD AUTO: 6.1 10E3/UL (ref 4–11)

## 2024-11-11 PROCEDURE — 96402 CHEMO HORMON ANTINEOPL SQ/IM: CPT

## 2024-11-11 PROCEDURE — 80051 ELECTROLYTE PANEL: CPT | Mod: ZL

## 2024-11-11 PROCEDURE — G0463 HOSPITAL OUTPT CLINIC VISIT: HCPCS

## 2024-11-11 PROCEDURE — 250N000011 HC RX IP 250 OP 636: Mod: JZ | Performed by: NURSE PRACTITIONER

## 2024-11-11 PROCEDURE — 36415 COLL VENOUS BLD VENIPUNCTURE: CPT | Mod: ZL

## 2024-11-11 PROCEDURE — 82247 BILIRUBIN TOTAL: CPT | Mod: ZL

## 2024-11-11 PROCEDURE — G0463 HOSPITAL OUTPT CLINIC VISIT: HCPCS | Mod: 25

## 2024-11-11 PROCEDURE — 84155 ASSAY OF PROTEIN SERUM: CPT | Mod: ZL

## 2024-11-11 PROCEDURE — 85004 AUTOMATED DIFF WBC COUNT: CPT | Mod: ZL

## 2024-11-11 RX ORDER — DEXAMETHASONE 4 MG/1
20 TABLET ORAL ONCE
Status: COMPLETED | OUTPATIENT
Start: 2024-11-11 | End: 2024-11-11

## 2024-11-11 RX ORDER — MONTELUKAST SODIUM 10 MG/1
10 TABLET ORAL ONCE
Status: CANCELLED | OUTPATIENT
Start: 2024-11-12

## 2024-11-11 RX ORDER — DIPHENHYDRAMINE HYDROCHLORIDE 50 MG/ML
50 INJECTION INTRAMUSCULAR; INTRAVENOUS
Status: CANCELLED
Start: 2024-11-12

## 2024-11-11 RX ORDER — ALBUTEROL SULFATE 0.83 MG/ML
2.5 SOLUTION RESPIRATORY (INHALATION)
Status: CANCELLED | OUTPATIENT
Start: 2024-11-12

## 2024-11-11 RX ORDER — MONTELUKAST SODIUM 10 MG/1
10 TABLET ORAL ONCE
Status: COMPLETED | OUTPATIENT
Start: 2024-11-11 | End: 2024-11-11

## 2024-11-11 RX ORDER — EPINEPHRINE 1 MG/ML
0.3 INJECTION, SOLUTION, CONCENTRATE INTRAVENOUS EVERY 5 MIN PRN
Status: CANCELLED | OUTPATIENT
Start: 2024-11-12

## 2024-11-11 RX ORDER — ACETAMINOPHEN 325 MG/1
650 TABLET ORAL ONCE
Status: COMPLETED | OUTPATIENT
Start: 2024-11-11 | End: 2024-11-11

## 2024-11-11 RX ORDER — DIPHENHYDRAMINE HCL 50 MG
50 CAPSULE ORAL ONCE
Status: COMPLETED | OUTPATIENT
Start: 2024-11-11 | End: 2024-11-11

## 2024-11-11 RX ORDER — ACETAMINOPHEN 325 MG/1
650 TABLET ORAL
Status: CANCELLED | OUTPATIENT
Start: 2024-11-12

## 2024-11-11 RX ORDER — MEPERIDINE HYDROCHLORIDE 25 MG/ML
25 INJECTION INTRAMUSCULAR; INTRAVENOUS; SUBCUTANEOUS EVERY 30 MIN PRN
Status: CANCELLED | OUTPATIENT
Start: 2024-11-12

## 2024-11-11 RX ORDER — LORAZEPAM 2 MG/ML
0.5 INJECTION INTRAMUSCULAR EVERY 4 HOURS PRN
Status: CANCELLED | OUTPATIENT
Start: 2024-11-12

## 2024-11-11 RX ORDER — ACETAMINOPHEN 325 MG/1
650 TABLET ORAL ONCE
Status: CANCELLED | OUTPATIENT
Start: 2024-11-12

## 2024-11-11 RX ORDER — METHYLPREDNISOLONE SODIUM SUCCINATE 125 MG/2ML
125 INJECTION INTRAMUSCULAR; INTRAVENOUS
Status: CANCELLED
Start: 2024-11-12

## 2024-11-11 RX ORDER — DIPHENHYDRAMINE HCL 50 MG
50 CAPSULE ORAL ONCE
Status: CANCELLED | OUTPATIENT
Start: 2024-11-12

## 2024-11-11 RX ORDER — DIPHENHYDRAMINE HCL 50 MG
50 CAPSULE ORAL
Status: CANCELLED | OUTPATIENT
Start: 2024-11-12

## 2024-11-11 RX ORDER — ALBUTEROL SULFATE 90 UG/1
1-2 INHALANT RESPIRATORY (INHALATION)
Status: CANCELLED
Start: 2024-11-12

## 2024-11-11 RX ORDER — DEXAMETHASONE 4 MG/1
20 TABLET ORAL ONCE
Status: CANCELLED | OUTPATIENT
Start: 2024-11-12

## 2024-11-11 RX ADMIN — Medication 50 MG: at 15:14

## 2024-11-11 RX ADMIN — DARATUMUMAB AND HYALURONIDASE-FIHJ (HUMAN RECOMBINANT) 1800 MG: 1800; 30000 INJECTION SUBCUTANEOUS at 15:35

## 2024-11-11 RX ADMIN — DEXAMETHASONE 20 MG: 4 TABLET ORAL at 15:14

## 2024-11-11 RX ADMIN — ACETAMINOPHEN 650 MG: 325 TABLET ORAL at 15:14

## 2024-11-11 RX ADMIN — MONTELUKAST SODIUM 10 MG: 10 TABLET ORAL at 15:14

## 2024-11-11 ASSESSMENT — PAIN SCALES - GENERAL: PAINLEVEL_OUTOF10: NO PAIN (0)

## 2024-11-11 NOTE — PROGRESS NOTES
Infusion Nursing Note:  Carmelita Watts presents today for darzalex.    Patient seen by provider today: Yes: Iram Gonzalez   present during visit today: Not Applicable.    Note: N/A.      Intravenous Access:  No Intravenous access/labs at this visit.    Treatment Conditions:  Lab Results   Component Value Date    HGB 12.7 11/11/2024    WBC 6.1 11/11/2024    ANEU 8.8 (H) 08/06/2024    ANEUTAUTO 2.2 11/11/2024     11/11/2024        Lab Results   Component Value Date     11/11/2024    POTASSIUM 3.7 11/11/2024    CR 0.80 11/11/2024    PAYAL 9.2 11/11/2024    BILITOTAL 0.3 11/11/2024    ALBUMIN 4.0 11/11/2024    ALT 17 11/11/2024    AST 16 11/11/2024       Results reviewed, labs MET treatment parameters, ok to proceed with treatment.      Post Infusion Assessment:  Patient tolerated injection without incident.  Site patent and intact, free from redness, edema or discomfort.  No evidence of extravasations.       Discharge Plan:   Patient and/or family verbalized understanding of discharge instructions and all questions answered.      TERRENCE GUSMAN

## 2024-11-11 NOTE — PROGRESS NOTES
Oncology Follow-up Visit    Reason for Visit:  Carmelita is a 74 year old woman with a diagnosis of multiple myeloma, who I am visiting with today for routine follow-up and consideration of ongoing therapy.     Nursing Note and documentation reviewed: Yes    Interval History:   Doing well. No new concerns. Does note periodic pain in right ribs and breast bone but if she applies pressure, pain resolves. This is intermittent. Nothing constant or debilitating. No recent infection. No bleeding concerns. No nausea or vomiting. No bowels concerns. No skin concerns that she notes. Energy decent, she just knows her limits. Staying active.     Oncologic History  12/31/2018 Presented to the emergency room with vertigo and fatigue.  CT scan of the head was negative and subsequent stress test was negative.  05/03/2019 PCP ordered lab work and noted a total protein of 12.9.  SPEP at that time showed an M spike of 6.2 with a large monoclonal protein seen in the gamma fraction. Urine immunofixation showed a possible small protein band in the gamma fraction  05/31/2019 Evaluated by Dr. Walker with Medical Oncology with plan to rule out myeloma and obtain bone marrow aspiration biopsy as well as a metastatic bone survey along with additional labwork  06/18/2019 Underwent bone marrow aspiration and biopsy  06/24/2019 Seen again by Dr. Walker and CBC showed a hemoglobin of 9.3, M spike 7.3 with monoclonal IgG immunoglobulin of kappa light chain type; serum viscosity was 2.9; quantitative immunoglobulins showed an IgG of 8160, beta-2 microglobulin was 5.8, BUN was 21 with creatinine is 0.8 and total protein was 13.7.  Quantitative kappa/lambda free light chains showed an elevated ratio of 17.0 bone marrow aspiration biopsy showed plasma cell myeloma with approximately 80% plasma cells.  Immunofixation showed IgG kappa and flow cytometry revealed kappa monotypic plasma cells consistent with clonal plasma cell neoplasm and FISH panel  was pending at that time. It was felt she had at least stage II disease based on her beta-2 microglobulin and anemia. Plan was to treat with Velcade 1.3 mg per/m2 days 1, 4, 8 and 11/Decadron 40 mg on days 1, 8 and 15. Initiation of Revlimid with C2 at 25 mg daily days 1 through 14.  Plan was to also obtain an MRI of the lumbar spine to rule out lytic lesion at L3.  She was initiated on Zovirax 400 mg p.o. twice daily.  06/25/2019 C1 of chemotherapy initiated  07/01/2019 Note in chart regarding patient's large co-pay for the Revlimid and no plan at this point to initiate Revlimid and treat only with Velcade and Decadron per Dr. Walker  07/11/2019 MRI lumbar spine shows a pathologic superior endplate compression fracture at L3 without evidence of retropulsed fragment and innumerable enhancing lesions throughout the lumbar spine consistent with history of multiple myeloma.  09/10/2019 Increasing M-spike and kappa lambda ratio  10/01/2019 Initiation of CyBorD  11/17/2020 Plateau of M-spike at 1.2 after 36 cycles of CyBorD  12/01/2020 Initiation of Darzalex Faspro injection  03/23/2021 M-spike increased form 1.0 to 1.2 and velcade and dexamethasone added to plan  04/12/2022 Treatment HOLIDAY commenced with Mspike 0.3 after 22 cycles of treatent  05/27/2022 Received Evusheld while on treatment holiday.   07/18/2023 Mspike 0.8. Other labs stable. PET scan ordered.   08/02/2023 PET completed showing negative study. No evidence of mass or lymphadenopathy. No concerning hypermetabolism is present.  08/14/2023 Met with Dr. Walker. Given biochemical progression, he did feel appropriate to resume therapy again with Darzalex faspro  03/12/2023 Following C7, mspike magy from 0.2-->0.8. PET ordered which was negative. Again increased frequency of Darzalex with anticipation of adding in Pomalyst with C2.    04/29/2024 Addition of Pomalyst with C2    Current Chemo Regime/TX: Darzalex faspro injection 1800mg subcutaneous per  "protocol and Pomalyst 1 mg days 1-21 of 28 day cycle given with weekly Dex  Current Cycle: C9  Completed cycles: 8  **Pom added C2 (4/29)     Previous treatment:     Velcade 1.3 mg/m2 days 1, 4, 8 and 11 with Decadron 40 mg days 1, 8 and 15 x 4 cycles;     Velcade 1.5mg.m2, cyclophosphamide 150mg every 7 days on days 1,8,15 and 22 with decadron 40mg days 1,8,15,22  Darzalex faspro injection 1800mg subcutaneous per protocol    Past Medical History:   Diagnosis Date    Arthritis     Cyst of breast     Depressive disorder     Diabetes mellitus, type 2 (H) 01/18/2021    Essential hypertension 10/01/2015    Major depressive disorder, recurrent episode, mild (H) 10/01/2015    Mixed hyperlipidemia 10/01/2015    Multiple myeloma not having achieved remission (H) 06/24/2019    Other specified disorders of bladder 07/09/2012    Irritable Bladder    Seasonal allergies 10/01/2015    Unspecified essential hypertension 03/19/2007    Unspecified sinusitis (chronic) 09/05/2007       Past Surgical History:   Procedure Laterality Date    APPENDECTOMY      BONE MARROW BIOPSY, BONE SPECIMEN, NEEDLE/TROCAR N/A 06/18/2019    Procedure: BONE MARROW BIOPSY;  Surgeon: Maciej Sanz MD;  Location: [facility]    CHOLECYSTECTOMY      COLONOSCOPY  07/18/2006    repeat 10 years    COLONOSCOPY N/A 12/30/2016    Procedure: COLONOSCOPY;  Surgeon: Bhaskar Franklin DO;  Location: [facility] OR    PUNC/ASPIR BREAST CYST Left 1995    benign    SINUS SURGERY      TUBAL STERILIZATION         Family History   Problem Relation Age of Onset    Breast Cancer Mother 60        Cause of Death; 1 breast    Parkinsonism Father         \"Possible\"    Coronary Artery Disease Father     Pacemaker Father     Thyroid Disease Daughter     Breast Cancer Maternal Aunt         over 50    Diabetes No family hx of     Hypertension No family hx of     Hyperlipidemia No family hx of     Cerebrovascular Disease No family hx of     Colon Cancer No family hx of     Prostate Cancer No " family hx of     Genetic Disorder No family hx of     Asthma No family hx of     Anesthesia Reaction No family hx of        Social History     Socioeconomic History    Marital status:      Spouse name: Not on file    Number of children: Not on file    Years of education: Not on file    Highest education level: Not on file   Occupational History    Occupation: Financial     Comment:  - (FT)   Tobacco Use    Smoking status: Never    Smokeless tobacco: Never    Tobacco comments:     Tried to Quit (YES); QUIT in 1971; Passive Exposure (NO)   Vaping Use    Vaping status: Never Used   Substance and Sexual Activity    Alcohol use: Yes     Comment: RARELY    Drug use: No    Sexual activity: Yes     Partners: Male     Birth control/protection: None   Other Topics Concern     Service Not Asked    Blood Transfusions Not Asked    Caffeine Concern Yes     Comment: Coffee >6 cups daily    Occupational Exposure Not Asked    Hobby Hazards Not Asked    Sleep Concern Not Asked    Stress Concern Not Asked    Weight Concern Not Asked    Special Diet Not Asked    Back Care Not Asked    Exercise Not Asked    Bike Helmet Not Asked    Seat Belt Not Asked    Self-Exams Not Asked    Parent/sibling w/ CABG, MI or angioplasty before 65F 55M? No   Social History Narrative    Not on file     Social Drivers of Health     Financial Resource Strain: Low Risk  (11/27/2023)    Financial Resource Strain     Within the past 12 months, have you or your family members you live with been unable to get utilities (heat, electricity) when it was really needed?: No   Food Insecurity: Low Risk  (11/27/2023)    Food Insecurity     Within the past 12 months, did you worry that your food would run out before you got money to buy more?: No     Within the past 12 months, did the food you bought just not last and you didn t have money to get more?: No   Transportation Needs: Low Risk  (11/27/2023)    Transportation Needs     Within the  past 12 months, has lack of transportation kept you from medical appointments, getting your medicines, non-medical meetings or appointments, work, or from getting things that you need?: No   Physical Activity: Not on file   Stress: Not on file   Social Connections: Not on file   Interpersonal Safety: Low Risk  (11/11/2024)    Interpersonal Safety     Do you feel physically and emotionally safe where you currently live?: Yes     Within the past 12 months, have you been hit, slapped, kicked or otherwise physically hurt by someone?: No     Within the past 12 months, have you been humiliated or emotionally abused in other ways by your partner or ex-partner?: No   Housing Stability: Low Risk  (11/27/2023)    Housing Stability     Do you have housing? : Yes     Are you worried about losing your housing?: No       Current Outpatient Medications   Medication Sig Dispense Refill    acyclovir (ZOVIRAX) 400 MG tablet Take 1 tablet (400 mg) by mouth 2 times daily Start 1 week prior to daratumumab and continue for 3 months after daratumumab treatment complete. 60 tablet 11    aspirin (ASA) 81 MG chewable tablet Take 81 mg by mouth daily      atorvastatin (LIPITOR) 20 MG tablet TAKE 1 TABLET BY MOUTH DAILY 90 tablet 0    cetirizine (ZYRTEC) 10 MG tablet Take 1 tablet (10 mg) by mouth daily 30 tablet 1    [START ON 11/12/2024] dexAMETHasone (DECADRON) 4 MG tablet Take 5 tablets (20 mg) on Days 8, 15, and 22. 15 tablet 0    diphenhydrAMINE (BENADRYL) 25 MG capsule Take 25 mg by mouth daily      fluticasone (FLONASE) 50 MCG/ACT nasal spray SPRAY 2 SPRAYS INTO BOTH NOSTRILS DAILY 48 mL 0    hydrocortisone (CORTAID) 1 % external cream Apply topically 2 times daily sparingly over rash to face and chest 45 g 0    losartan (COZAAR) 50 MG tablet TAKE 1 TABLET BY MOUTH DAILY 90 tablet 3    [START ON 11/12/2024] pomalidomide (POMALYST) 1 MG capsule Take 1 capsule (1 mg) by mouth daily for 21 days Day 1 thru 21 of each 28 day cycle. Swallow  "whole with water. 21 capsule 0    sertraline (ZOLOFT) 50 MG tablet TAKE 1/2 TABLET BY MOUTH DAILY 30 tablet 0    L-Lysine HCl 500 MG CAPS Take by mouth daily (Patient not taking: Reported on 11/11/2024)      prochlorperazine (COMPAZINE) 10 MG tablet Take 1 tablet (10 mg) by mouth every 6 hours as needed for nausea or vomiting (Patient not taking: Reported on 11/11/2024) 30 tablet 2     No current facility-administered medications for this visit.     Facility-Administered Medications Ordered in Other Visits   Medication Dose Route Frequency Provider Last Rate Last Admin    acetaminophen (TYLENOL) tablet 650 mg  650 mg Oral Once Nida Gonzalez APRN CNP        daratumumab-hyaluronidase-fihj (DARZALEX FASPRO) SUBCUTANEOUS injection 1,800 mg  1,800 mg Subcutaneous Once Nida Gonzalez APRN CNP        dexAMETHasone (DECADRON) tablet 20 mg  20 mg Oral Once Nida Gonzalez APRN CNP        diphenhydrAMINE (BENADRYL) capsule 50 mg  50 mg Oral Once Nida Gonzalez APRN CNP        montelukast (SINGULAIR) tablet 10 mg  10 mg Oral Once Nida Gonzalez APRN CNP            Allergies   Allergen Reactions    Lisinopril Cough    Phenylephrine Hcl Other (See Comments)     **Entex  HEADACHE (SEVERE)    Phenylpropanolamine Other (See Comments)     **Entex  HEADACHE (SEVERE)    Pseudoephedrine Tannate Other (See Comments)     **Entex  HEADACHE (SEVERE)    Levofloxacin Rash     **Levaquin    Moxifloxacin Hcl [Moxifloxacin Hcl In Nacl] Rash       Review Of Systems:  A complete review of systems is negative except for the above mentioned items in the interval history.     ECOG PS: 0    Physical Exam:  /68 (BP Location: Right arm, Patient Position: Sitting, Cuff Size: Adult Regular)   Pulse 79   Temp 97.9  F (36.6  C) (Tympanic)   Ht 1.6 m (5' 3\")   Wt 79.4 kg (175 lb 0.7 oz)   SpO2 98%   BMI 31.01 kg/m    GENERAL APPEARANCE: Alert and in no acute distress.  HEENT: Eyes appear normal " without scleral icterus. Extraocular movements intact.   NECK:   Supple with normal range of motion. No asymmetry or masses. She does have some bilateral fullness without obvious mass.   LYMPHATICS: No palpable cervical, supraclavicular nodes.  RESP: Lungs clear to auscultation bilaterally, respirations regular and easy.  CARDIOVASCULAR: Regular rate and rhythm. Normal S1, S2; no murmur, gallop, or rub.  ABDOMEN: Soft, non-tender, non-distended. No palpable organomegaly or masses.  MUSCULOSKELETAL: Extremities without gross deformities noted. No edema of bilateral lower extremities.  SKIN: No suspicious lesions or rashes.  NEURO: Alert and oriented x 3.  Gait steady.  PSYCHIATRIC: Mentation and affect appear normal.  Mood appropriate.    Laboratory:  Results for orders placed or performed in visit on 11/11/24   Comprehensive metabolic panel     Status: Abnormal   Result Value Ref Range    Sodium 138 135 - 145 mmol/L    Potassium 3.7 3.4 - 5.3 mmol/L    Carbon Dioxide (CO2) 24 22 - 29 mmol/L    Anion Gap 12 7 - 15 mmol/L    Urea Nitrogen 13.5 8.0 - 23.0 mg/dL    Creatinine 0.80 0.51 - 0.95 mg/dL    GFR Estimate 76 >60 mL/min/1.73m2    Calcium 9.2 8.8 - 10.4 mg/dL    Chloride 102 98 - 107 mmol/L    Glucose 163 (H) 70 - 99 mg/dL    Alkaline Phosphatase 128 40 - 150 U/L    AST 16 0 - 45 U/L    ALT 17 0 - 50 U/L    Protein Total 6.0 (L) 6.4 - 8.3 g/dL    Albumin 4.0 3.5 - 5.2 g/dL    Bilirubin Total 0.3 <=1.2 mg/dL   CBC with platelets and differential     Status: Abnormal   Result Value Ref Range    WBC Count 6.1 4.0 - 11.0 10e3/uL    RBC Count 4.28 3.80 - 5.20 10e6/uL    Hemoglobin 12.7 11.7 - 15.7 g/dL    Hematocrit 38.9 35.0 - 47.0 %    MCV 91 78 - 100 fL    MCH 29.7 26.5 - 33.0 pg    MCHC 32.6 31.5 - 36.5 g/dL    RDW 16.4 (H) 10.0 - 15.0 %    Platelet Count 197 150 - 450 10e3/uL    % Neutrophils 36 %    % Lymphocytes 46 %    % Monocytes 11 %    % Eosinophils 6 %    % Basophils 1 %    % Immature Granulocytes 0 %     NRBCs per 100 WBC 0 <1 /100    Absolute Neutrophils 2.2 1.6 - 8.3 10e3/uL    Absolute Lymphocytes 2.8 0.8 - 5.3 10e3/uL    Absolute Monocytes 0.7 0.0 - 1.3 10e3/uL    Absolute Eosinophils 0.4 0.0 - 0.7 10e3/uL    Absolute Basophils 0.1 0.0 - 0.2 10e3/uL    Absolute Immature Granulocytes 0.0 <=0.4 10e3/uL    Absolute NRBCs 0.0 10e3/uL   CBC with Platelets & Differential     Status: Abnormal    Narrative    The following orders were created for panel order CBC with Platelets & Differential.  Procedure                               Abnormality         Status                     ---------                               -----------         ------                     CBC with platelets and d...[[id number]]  Abnormal            Final result                 Please view results for these tests on the individual orders.       Component      Latest Ref Rng 11/4/2024  11:21 AM   Albumin Fraction      3.7 - 5.1 g/dL 3.8    Alpha 1 Fraction      0.2 - 0.4 g/dL 0.3    Alpha 2 Fraction      0.5 - 0.9 g/dL 0.8    Beta Fraction      0.6 - 1.0 g/dL 0.7    Gamma Fraction      0.7 - 1.6 g/dL 0.4 (L)    Monoclonal Peak      <=0.0 g/dL 0.1 (H)    ELP Interpretation: Very small monoclonal protein (0.1 g/dL) seen in the gamma fraction. See immunofixation report on same specimen. Hypogammaglobulinemia. Pathologic significance requires clinical correlation. Mary Adrian M.D.    Signout Location if Remote BTH1    Signout Location if Remote BT    IGG      610 - 1,616 mg/dL 425 (L)    IGA      84 - 499 mg/dL 36 (L)    IGM      35 - 242 mg/dL 37    Wimer Free Lt Chain      0.33 - 1.94 mg/dL 0.68    Lambda Free Lt Chain      0.57 - 2.63 mg/dL 0.69    Kappa Lambda Ratio      0.26 - 1.65  0.99    Immunofixation ELP Monoclonal IgG immunoglobulin of kappa light chain type. Monoclonal antibody therapeutics (e.g. Daratumumab) may appear as monoclonal proteins on serum electrophoresis and immunofixation. Results should be interpreted with caution.  Pathologic significance requires clinical correlation. Basim Tomlinson M.D., Ph.D., Pathologist    Beta-2-Microglobulin      <2.3 mg/L 2.1    Viscosity Index      1.4 - 1.8  1.6    Total Protein Serum for ELP      6.4 - 8.3 g/dL 6.0 (L)       Legend:  (L) Low  (H) High    Imaging Studies:  None this visit.     ASSESSMENT/PLAN:  #1 Multiple myeloma IgG kappa multiple myeloma with 80% involvement of the bone marrow diagnosed June 2019 initially treated with Velcade and Decadron and received 4 cycles with increasing M-spike and kappa/lambda ratio.  Treatment changed to CyBorD on 10/1/2019.  M-spike plateau at 1.2 and treatment changed to monotherapy with Darzalex Faspro injection. M spike declined to 1.0 then increased again to 1.2 so Velcade and Dex added to her treatment plan with cycle 5 therapy. Following C22 (April 2022), she did initiate a treatment holiday when her Mspike was 0.3.     We had followed her counts for over a year. Unfortunately, more recently, she did see a rise in her mspike to 0.8. PET completed was negative. Dr. Walker felt this was a biochemical progression and after discussion with patient, elected to resume treatment with Darzalex faspro. Mspike began to rise. PET was negative but feeling like she wasn't responding to Darzalex alone, decided to add in Pomalyst 1 mg p.o. daily on days 1 through 21 to begin with cycle 2 in addition to dexamethasone 20 mg p.o. on days 1 ,8,15,22.    Today, she returns for C9 Darzalex with Pomalyst along with weekly Dexamethasone. She is due to commence Pom and Dex tomorrow. Her mspike remains stable at 0.1. She is tolerating therapy with modest side effects. No contraindications to proceeding with therapy. Labs in 3 weeks with follow-up in 4 with planned Darzalex afterwards.She is requesting to see Dr. Walker for this visit, which has been scheduled accordingly.     #2 Lytic lesions Hx lytic lesion at L3. Zometa Q3M, due next Jan 2025 She remains on  calcium with vitamin D twice a day. Weight bearing activity.    #3 Bone pain Occasional pain in ribs and sternum. Intermittent. No prior disease in these areas on last PET which was reviewed with patient. Will monitor. No changes in labs at this time.         Patient in agreement with plan and verbalizes understanding. Agrees to call with any questions or concerns.      36 minutes spent in the patient's encounter today with time spent in review of patient's chart along with chart preparation and review of the treatment plan and signing of treatment plan.  Time was also spent with the patient in obtaining a review of systems and performing a physical exam along with detailed review of all test results. Time was also spent in discussing plan for future follow-up and relating instructions for follow-up and in placing future orders.    The longitudinal plan of care for the diagnosis(es)/condition(s) as documented were addressed during this visit. Due to the added complexity in care, I will continue to support Carmelita in the subsequent management and with ongoing continuity of care.        FLO Duque, FNP-C

## 2024-11-11 NOTE — NURSING NOTE
"Oncology Rooming Note    November 11, 2024 2:32 PM   Carmelita Watts is a 74 year old female who presents for:    Chief Complaint   Patient presents with    Oncology Clinic Visit     Follow up. Multiple myeloma not having achieved remission      Initial Vitals: /68 (BP Location: Right arm, Patient Position: Sitting, Cuff Size: Adult Regular)   Pulse 79   Temp 97.9  F (36.6  C) (Tympanic)   Ht 1.6 m (5' 3\")   Wt 79.4 kg (175 lb 0.7 oz)   SpO2 98%   BMI 31.01 kg/m   Estimated body mass index is 31.01 kg/m  as calculated from the following:    Height as of this encounter: 1.6 m (5' 3\").    Weight as of this encounter: 79.4 kg (175 lb 0.7 oz). Body surface area is 1.88 meters squared.  No Pain (0) Comment: Data Unavailable   No LMP recorded. Patient is postmenopausal.  Allergies reviewed: Yes  Medications reviewed: Yes    Medications: Medication refills not needed today.  Pharmacy name entered into [facility]:    [id number] - [place], [place] - [address] AT [facility] [address]  [facility] - [place], [place] - [address]    Frailty Screening:   Is the patient here for a new oncology consult visit in cancer care? 2. No      Clinical concerns: none       Julia See LPN              "

## 2024-11-24 ENCOUNTER — HEALTH MAINTENANCE LETTER (OUTPATIENT)
Age: 74
End: 2024-11-24

## 2024-12-01 ENCOUNTER — HOSPITAL ENCOUNTER (EMERGENCY)
Facility: HOSPITAL | Age: 74
Discharge: HOME | End: 2024-12-01
Attending: PHYSICIAN ASSISTANT
Payer: MEDICARE

## 2024-12-01 VITALS
DIASTOLIC BLOOD PRESSURE: 77 MMHG | TEMPERATURE: 97.8 F | HEART RATE: 77 BPM | OXYGEN SATURATION: 97 % | SYSTOLIC BLOOD PRESSURE: 125 MMHG | BODY MASS INDEX: 31.02 KG/M2 | RESPIRATION RATE: 16 BRPM | WEIGHT: 175.04 LBS | HEIGHT: 63 IN

## 2024-12-01 DIAGNOSIS — J20.9 ACUTE BRONCHITIS WITH SYMPTOMS > 10 DAYS: ICD-10-CM

## 2024-12-01 DIAGNOSIS — H66.92 ACUTE LEFT OTITIS MEDIA: ICD-10-CM

## 2024-12-01 PROCEDURE — 99213 OFFICE O/P EST LOW 20 MIN: CPT | Performed by: PHYSICIAN ASSISTANT

## 2024-12-01 PROCEDURE — 250N000013 HC RX MED GY IP 250 OP 250 PS 637: Performed by: PHYSICIAN ASSISTANT

## 2024-12-01 PROCEDURE — G0463 HOSPITAL OUTPT CLINIC VISIT: HCPCS

## 2024-12-01 RX ADMIN — AMOXICILLIN AND CLAVULANATE POTASSIUM 1 TABLET: 875; 125 TABLET, FILM COATED ORAL at 12:44

## 2024-12-01 NOTE — ED TRIAGE NOTES
Pt presents with c/o cough, chest congestion, and bilateral ear fullness. Sx started thanksgiving day. Reports she is on chemo so is trying to get better before next treatment. No otc meds. Pt declines covid testing at this time.

## 2024-12-01 NOTE — ED PROVIDER NOTES
History     Chief Complaint   Patient presents with    Cough     HPI  Carmelita Watts is a 74 year old female who has h/o multiple myeloma currently undergoing chemo who presents with worsening productive cough, sinus congestion, and ear pain x 7days. She has had drainage from her left ear which has a chronic perforation so she began using ofloxacin drops this am. Denies dyspnea or chest pain. No fevers/chills.     Allergies:  Allergies   Allergen Reactions    Lisinopril Cough    Phenylephrine Hcl Other (See Comments)     **Entex  HEADACHE (SEVERE)    Phenylpropanolamine Other (See Comments)     **Entex  HEADACHE (SEVERE)    Pseudoephedrine Tannate Other (See Comments)     **Entex  HEADACHE (SEVERE)    Levofloxacin Rash     **Levaquin    Moxifloxacin Hcl [Moxifloxacin Hcl In Nacl] Rash       Problem List:    Patient Active Problem List    Diagnosis Date Noted    Arthritis 11/27/2023     Priority: Medium    SOB (shortness of breath) 09/20/2021     Priority: Medium    Diabetes mellitus, type 2 (H) 01/18/2021     Priority: Medium    Multiple myeloma not having achieved remission (H) 06/24/2019     Priority: Medium    ACP (advance care planning) 10/26/2016     Priority: Medium     Advance Care Planning 10/26/2016: ACP Review of Chart / Resources Provided:  Reviewed chart for advance care plan.  Carmelita Watts has no plan or code status on file. Discussed available resources and provided with information. Confirmed code status reflects current choices pending further ACP discussions.  Confirmed/documented legally designated decision makers.  Added by Alison Landeros            Major depressive disorder, recurrent episode, mild (H) 10/01/2015     Priority: Medium    Seasonal allergies 10/01/2015     Priority: Medium    Mixed hyperlipidemia 10/01/2015     Priority: Medium    Hyperlipidemia LDL goal <130 07/05/2013     Priority: Medium    Hypertension goal BP (blood pressure) < 140/80 07/05/2013     Priority: Medium     "Advanced care planning/counseling discussion 07/09/2012     Priority: Medium        Past Medical History:    Past Medical History:   Diagnosis Date    Arthritis     Cyst of breast     Depressive disorder     Diabetes mellitus, type 2 (H) 01/18/2021    Essential hypertension 10/01/2015    Major depressive disorder, recurrent episode, mild (H) 10/01/2015    Mixed hyperlipidemia 10/01/2015    Multiple myeloma not having achieved remission (H) 06/24/2019    Other specified disorders of bladder 07/09/2012    Seasonal allergies 10/01/2015    Unspecified essential hypertension 03/19/2007    Unspecified sinusitis (chronic) 09/05/2007       Past Surgical History:    Past Surgical History:   Procedure Laterality Date    APPENDECTOMY      BONE MARROW BIOPSY, BONE SPECIMEN, NEEDLE/TROCAR N/A 06/18/2019    Procedure: BONE MARROW BIOPSY;  Surgeon: Maciej Sanz MD;  Location: [facility]    CHOLECYSTECTOMY      COLONOSCOPY  07/18/2006    repeat 10 years    COLONOSCOPY N/A 12/30/2016    Procedure: COLONOSCOPY;  Surgeon: Bhaskar Franklin DO;  Location: [facility]    PUNC/ASPIR BREAST CYST Left 1995    benign    SINUS SURGERY      TUBAL STERILIZATION         Family History:    Family History   Problem Relation Age of Onset    Breast Cancer Mother 60        Cause of Death; 1 breast    Parkinsonism Father         \"Possible\"    Coronary Artery Disease Father     Pacemaker Father     Thyroid Disease Daughter     Breast Cancer Maternal Aunt         over 50    Diabetes No family hx of     Hypertension No family hx of     Hyperlipidemia No family hx of     Cerebrovascular Disease No family hx of     Colon Cancer No family hx of     Prostate Cancer No family hx of     Genetic Disorder No family hx of     Asthma No family hx of     Anesthesia Reaction No family hx of        Social History:  Marital Status:   [5]  Social History     Tobacco Use    Smoking status: Never    Smokeless tobacco: Never    Tobacco comments:     Tried to Quit " "(YES); QUIT in 1971; Passive Exposure (NO)   Vaping Use    Vaping status: Never Used   Substance Use Topics    Alcohol use: Yes     Comment: RARELY    Drug use: No        Medications:    amoxicillin-clavulanate (AUGMENTIN) 875-125 MG tablet  acyclovir (ZOVIRAX) 400 MG tablet  aspirin (ASA) 81 MG chewable tablet  atorvastatin (LIPITOR) 20 MG tablet  cetirizine (ZYRTEC) 10 MG tablet  dexAMETHasone (DECADRON) 4 MG tablet  diphenhydrAMINE (BENADRYL) 25 MG capsule  fluticasone (FLONASE) 50 MCG/ACT nasal spray  hydrocortisone (CORTAID) 1 % external cream  L-Lysine HCl 500 MG CAPS  losartan (COZAAR) 50 MG tablet  pomalidomide (POMALYST) 1 MG capsule  prochlorperazine (COMPAZINE) 10 MG tablet  sertraline (ZOLOFT) 50 MG tablet          Review of Systems   All other systems reviewed and are negative.      Physical Exam   BP: 125/77  Pulse: 77  Temp: 97.8  F (36.6  C)  Resp: 16  Height: 160 cm (5' 3\")  Weight: 79.4 kg (175 lb 0.7 oz)  SpO2: 97 %      Physical Exam  Vitals and nursing note reviewed.   Constitutional:       General: She is not in acute distress.     Appearance: She is well-developed. She is not diaphoretic.   HENT:      Head: Normocephalic and atraumatic.      Right Ear: External ear normal. Tympanic membrane is not perforated or erythematous.      Left Ear: External ear normal. Drainage present. Tympanic membrane is perforated (chronic) and erythematous.      Nose: Congestion present.      Right Turbinates: Swollen.      Left Turbinates: Swollen.      Mouth/Throat:      Pharynx: Posterior oropharyngeal erythema present. No oropharyngeal exudate.   Eyes:      General: No scleral icterus.        Right eye: No discharge.         Left eye: No discharge.      Conjunctiva/sclera: Conjunctivae normal.      Pupils: Pupils are equal, round, and reactive to light.   Cardiovascular:      Rate and Rhythm: Normal rate and regular rhythm.      Heart sounds: Normal heart sounds. No murmur heard.     No friction rub. No " gallop.   Pulmonary:      Effort: Pulmonary effort is normal. No respiratory distress.      Breath sounds: Normal breath sounds. No wheezing or rales.   Chest:      Chest wall: No tenderness.   Abdominal:      General: Bowel sounds are normal.      Palpations: Abdomen is soft.      Tenderness: There is no abdominal tenderness.   Musculoskeletal:      Cervical back: Normal range of motion and neck supple.   Lymphadenopathy:      Cervical: No cervical adenopathy.   Skin:     General: Skin is warm and dry.      Coloration: Skin is not pale.      Findings: No erythema or rash.   Neurological:      Mental Status: She is alert and oriented to person, place, and time.      Cranial Nerves: No cranial nerve deficit.      Coordination: Coordination normal.   Psychiatric:         Behavior: Behavior normal.         Thought Content: Thought content normal.         Judgment: Judgment normal.         ED Course        Procedures    No results found. However, due to the size of the patient record, not all encounters were searched. Please check Results Review for a complete set of results.    Medications   amoxicillin-clavulanate (AUGMENTIN) 875-125 MG per tablet 1 tablet (has no administration in time range)       Assessments & Plan (with Medical Decision Making)   Exam consistent with acute otitis media of left ear with chronic perforation as well as acute bronchitis and sinusitis. Pt is non-toxic appearing and in NAD. Lungs are CTA but frequent productive cough heard. RX for Augmentin was provided. She was discharged home following in stable condition.     Plan:  Keep using your ofloxacin drops as prescribed.   Take the Augmentin as prescribed for your bronchitis, ear infection, and sinus infection.   Alternate between Ibuprofen and Tylenol every 4 hours for fever control.  Increase fluids and rest.  Use a humidifier at night.  Return here with any difficulty breathing or new/concerning symptoms.       I have reviewed the nursing  notes.    I have reviewed the findings, diagnosis, plan and need for follow up with the patient.      New Prescriptions    AMOXICILLIN-CLAVULANATE (AUGMENTIN) 875-125 MG TABLET    Take 1 tablet by mouth 2 times daily for 10 days.       Final diagnoses:   Acute left otitis media - with chronic perforation   Acute bronchitis with symptoms > 10 days       12/1/2024   [facility]

## 2024-12-01 NOTE — DISCHARGE INSTRUCTIONS
Keep using your ofloxacin drops as prescribed.   Take the Augmentin as prescribed for your bronchitis, ear infection, and sinus infection.   Alternate between Ibuprofen and Tylenol every 4 hours for fever control.  Increase fluids and rest.  Use a humidifier at night.  Return here with any difficulty breathing or new/concerning symptoms.

## 2024-12-02 ENCOUNTER — LAB (OUTPATIENT)
Dept: LAB | Facility: OTHER | Age: 74
End: 2024-12-02
Payer: MEDICARE

## 2024-12-02 DIAGNOSIS — C90.00 MULTIPLE MYELOMA NOT HAVING ACHIEVED REMISSION (H): Primary | ICD-10-CM

## 2024-12-02 DIAGNOSIS — C90.00 MULTIPLE MYELOMA NOT HAVING ACHIEVED REMISSION (H): ICD-10-CM

## 2024-12-02 LAB
ALBUMIN SERPL BCG-MCNC: 4.2 G/DL (ref 3.5–5.2)
ALP SERPL-CCNC: 143 U/L (ref 40–150)
ALT SERPL W P-5'-P-CCNC: 14 U/L (ref 0–50)
ANION GAP SERPL CALCULATED.3IONS-SCNC: 15 MMOL/L (ref 7–15)
AST SERPL W P-5'-P-CCNC: 16 U/L (ref 0–45)
BASOPHILS # BLD AUTO: 0.1 10E3/UL (ref 0–0.2)
BASOPHILS NFR BLD AUTO: 2 %
BILIRUB SERPL-MCNC: 0.4 MG/DL
BUN SERPL-MCNC: 14.7 MG/DL (ref 8–23)
CALCIUM SERPL-MCNC: 9 MG/DL (ref 8.8–10.4)
CHLORIDE SERPL-SCNC: 103 MMOL/L (ref 98–107)
CREAT SERPL-MCNC: 0.84 MG/DL (ref 0.51–0.95)
EGFRCR SERPLBLD CKD-EPI 2021: 72 ML/MIN/1.73M2
EOSINOPHIL # BLD AUTO: 0.5 10E3/UL (ref 0–0.7)
EOSINOPHIL NFR BLD AUTO: 10 %
ERYTHROCYTE [DISTWIDTH] IN BLOOD BY AUTOMATED COUNT: 16.6 % (ref 10–15)
GLUCOSE SERPL-MCNC: 102 MG/DL (ref 70–99)
HCO3 SERPL-SCNC: 22 MMOL/L (ref 22–29)
HCT VFR BLD AUTO: 40 % (ref 35–47)
HGB BLD-MCNC: 13.3 G/DL (ref 11.7–15.7)
IMM GRANULOCYTES # BLD: 0 10E3/UL
IMM GRANULOCYTES NFR BLD: 1 %
LYMPHOCYTES # BLD AUTO: 1.8 10E3/UL (ref 0.8–5.3)
LYMPHOCYTES NFR BLD AUTO: 42 %
MCH RBC QN AUTO: 29.6 PG (ref 26.5–33)
MCHC RBC AUTO-ENTMCNC: 33.3 G/DL (ref 31.5–36.5)
MCV RBC AUTO: 89 FL (ref 78–100)
MONOCYTES # BLD AUTO: 0.8 10E3/UL (ref 0–1.3)
MONOCYTES NFR BLD AUTO: 18 %
NEUTROPHILS # BLD AUTO: 1.3 10E3/UL (ref 1.6–8.3)
NEUTROPHILS NFR BLD AUTO: 28 %
NRBC # BLD AUTO: 0 10E3/UL
NRBC BLD AUTO-RTO: 0 /100
PLATELET # BLD AUTO: 181 10E3/UL (ref 150–450)
POTASSIUM SERPL-SCNC: 3.9 MMOL/L (ref 3.4–5.3)
PROT SERPL-MCNC: 6.5 G/DL (ref 6.4–8.3)
RBC # BLD AUTO: 4.5 10E6/UL (ref 3.8–5.2)
SODIUM SERPL-SCNC: 140 MMOL/L (ref 135–145)
TOTAL PROTEIN SERUM FOR ELP: 6.1 G/DL (ref 6.4–8.3)
WBC # BLD AUTO: 4.4 10E3/UL (ref 4–11)

## 2024-12-02 PROCEDURE — 80053 COMPREHEN METABOLIC PANEL: CPT | Mod: ZL

## 2024-12-02 PROCEDURE — 84165 PROTEIN E-PHORESIS SERUM: CPT | Mod: ZL | Performed by: STUDENT IN AN ORGANIZED HEALTH CARE EDUCATION/TRAINING PROGRAM

## 2024-12-02 PROCEDURE — 82784 ASSAY IGA/IGD/IGG/IGM EACH: CPT | Mod: ZL

## 2024-12-02 PROCEDURE — 85004 AUTOMATED DIFF WBC COUNT: CPT | Mod: ZL

## 2024-12-02 PROCEDURE — 86334 IMMUNOFIX E-PHORESIS SERUM: CPT | Mod: ZL | Performed by: STUDENT IN AN ORGANIZED HEALTH CARE EDUCATION/TRAINING PROGRAM

## 2024-12-02 PROCEDURE — 82310 ASSAY OF CALCIUM: CPT | Mod: ZL

## 2024-12-02 PROCEDURE — 83521 IG LIGHT CHAINS FREE EACH: CPT | Mod: ZL

## 2024-12-02 PROCEDURE — 85014 HEMATOCRIT: CPT | Mod: ZL

## 2024-12-02 PROCEDURE — 85810 BLOOD VISCOSITY EXAMINATION: CPT | Mod: ZL

## 2024-12-02 PROCEDURE — 82232 ASSAY OF BETA-2 PROTEIN: CPT | Mod: ZL

## 2024-12-02 PROCEDURE — 84155 ASSAY OF PROTEIN SERUM: CPT | Mod: ZL

## 2024-12-02 PROCEDURE — 86334 IMMUNOFIX E-PHORESIS SERUM: CPT | Mod: 26 | Performed by: PATHOLOGY

## 2024-12-02 PROCEDURE — 84165 PROTEIN E-PHORESIS SERUM: CPT | Mod: 26 | Performed by: PATHOLOGY

## 2024-12-02 PROCEDURE — 36415 COLL VENOUS BLD VENIPUNCTURE: CPT | Mod: ZL

## 2024-12-02 RX ORDER — DEXAMETHASONE 4 MG/1
TABLET ORAL
Qty: 15 TABLET | Refills: 0 | Status: SHIPPED | OUTPATIENT
Start: 2024-12-09

## 2024-12-03 ENCOUNTER — TELEPHONE (OUTPATIENT)
Dept: ONCOLOGY | Facility: CLINIC | Age: 74
End: 2024-12-03
Payer: COMMERCIAL

## 2024-12-03 LAB
ALBUMIN SERPL ELPH-MCNC: 3.8 G/DL (ref 3.7–5.1)
ALPHA1 GLOB SERPL ELPH-MCNC: 0.3 G/DL (ref 0.2–0.4)
ALPHA2 GLOB SERPL ELPH-MCNC: 0.9 G/DL (ref 0.5–0.9)
B-GLOBULIN SERPL ELPH-MCNC: 0.7 G/DL (ref 0.6–1)
B2 MICROGLOB TUMOR MARKER SER-MCNC: 2.8 MG/L
GAMMA GLOB SERPL ELPH-MCNC: 0.4 G/DL (ref 0.7–1.6)
IGA SERPL-MCNC: 42 MG/DL (ref 84–499)
IGG SERPL-MCNC: 375 MG/DL (ref 610–1616)
IGM SERPL-MCNC: 36 MG/DL (ref 35–242)
KAPPA LC FREE SER-MCNC: 0.87 MG/DL (ref 0.33–1.94)
KAPPA LC FREE/LAMBDA FREE SER NEPH: 1.47 {RATIO} (ref 0.26–1.65)
LAMBDA LC FREE SERPL-MCNC: 0.59 MG/DL (ref 0.57–2.63)
LOCATION OF TASK: NORMAL
M PROTEIN SERPL ELPH-MCNC: 0.1 G/DL
PROT PATTERN SERPL ELPH-IMP: ABNORMAL
PROT PATTERN SERPL IFE-IMP: NORMAL
VISC SER: 1.5 CP (ref 1.4–1.8)

## 2024-12-03 NOTE — TELEPHONE ENCOUNTER
Oral Chemotherapy Monitoring Program     Medication: Pomalyst  Rx: 1mg PO daily on days 1 through 21 of 28 day cycle   REMS AUTH [id number]  Routine survey questions reviewed  Rx to be Escribed to sp

## 2024-12-09 ENCOUNTER — LAB (OUTPATIENT)
Dept: LAB | Facility: OTHER | Age: 74
End: 2024-12-09
Attending: INTERNAL MEDICINE
Payer: COMMERCIAL

## 2024-12-09 ENCOUNTER — ONCOLOGY VISIT (OUTPATIENT)
Dept: ONCOLOGY | Facility: OTHER | Age: 74
End: 2024-12-09
Attending: INTERNAL MEDICINE
Payer: MEDICARE

## 2024-12-09 VITALS
RESPIRATION RATE: 20 BRPM | TEMPERATURE: 97.1 F | OXYGEN SATURATION: 97 % | HEART RATE: 88 BPM | BODY MASS INDEX: 31.17 KG/M2 | WEIGHT: 175.93 LBS | DIASTOLIC BLOOD PRESSURE: 60 MMHG | HEIGHT: 63 IN | SYSTOLIC BLOOD PRESSURE: 110 MMHG

## 2024-12-09 DIAGNOSIS — C90.00 MULTIPLE MYELOMA NOT HAVING ACHIEVED REMISSION (H): ICD-10-CM

## 2024-12-09 DIAGNOSIS — D80.1 HYPOGAMMAGLOBULINEMIA (H): Primary | ICD-10-CM

## 2024-12-09 DIAGNOSIS — C90.00 MULTIPLE MYELOMA NOT HAVING ACHIEVED REMISSION (H): Primary | ICD-10-CM

## 2024-12-09 DIAGNOSIS — D80.1 HYPOGAMMAGLOBULINEMIA (H): ICD-10-CM

## 2024-12-09 DIAGNOSIS — H66.90 EAR INFECTION: Primary | ICD-10-CM

## 2024-12-09 LAB
BASOPHILS # BLD AUTO: 0.1 10E3/UL (ref 0–0.2)
BASOPHILS NFR BLD AUTO: 2 %
EOSINOPHIL # BLD AUTO: 0.2 10E3/UL (ref 0–0.7)
EOSINOPHIL NFR BLD AUTO: 3 %
ERYTHROCYTE [DISTWIDTH] IN BLOOD BY AUTOMATED COUNT: 16.5 % (ref 10–15)
HCT VFR BLD AUTO: 39.8 % (ref 35–47)
HGB BLD-MCNC: 13.1 G/DL (ref 11.7–15.7)
IMM GRANULOCYTES # BLD: 0 10E3/UL
IMM GRANULOCYTES NFR BLD: 0 %
LYMPHOCYTES # BLD AUTO: 2.8 10E3/UL (ref 0.8–5.3)
LYMPHOCYTES NFR BLD AUTO: 46 %
MCH RBC QN AUTO: 29.5 PG (ref 26.5–33)
MCHC RBC AUTO-ENTMCNC: 32.9 G/DL (ref 31.5–36.5)
MCV RBC AUTO: 90 FL (ref 78–100)
MONOCYTES # BLD AUTO: 0.8 10E3/UL (ref 0–1.3)
MONOCYTES NFR BLD AUTO: 14 %
NEUTROPHILS # BLD AUTO: 2.1 10E3/UL (ref 1.6–8.3)
NEUTROPHILS NFR BLD AUTO: 35 %
NRBC # BLD AUTO: 0 10E3/UL
NRBC BLD AUTO-RTO: 0 /100
PLATELET # BLD AUTO: 225 10E3/UL (ref 150–450)
RBC # BLD AUTO: 4.44 10E6/UL (ref 3.8–5.2)
WBC # BLD AUTO: 6 10E3/UL (ref 4–11)

## 2024-12-09 PROCEDURE — 36415 COLL VENOUS BLD VENIPUNCTURE: CPT | Mod: ZL

## 2024-12-09 PROCEDURE — G0463 HOSPITAL OUTPT CLINIC VISIT: HCPCS

## 2024-12-09 PROCEDURE — 85025 COMPLETE CBC W/AUTO DIFF WBC: CPT | Mod: ZL

## 2024-12-09 PROCEDURE — 99417 PROLNG OP E/M EACH 15 MIN: CPT | Performed by: INTERNAL MEDICINE

## 2024-12-09 PROCEDURE — 99215 OFFICE O/P EST HI 40 MIN: CPT | Performed by: INTERNAL MEDICINE

## 2024-12-09 PROCEDURE — G2211 COMPLEX E/M VISIT ADD ON: HCPCS | Performed by: INTERNAL MEDICINE

## 2024-12-09 RX ORDER — EPINEPHRINE 1 MG/ML
0.3 INJECTION, SOLUTION, CONCENTRATE INTRAVENOUS EVERY 5 MIN PRN
Status: CANCELLED | OUTPATIENT
Start: 2024-12-09

## 2024-12-09 RX ORDER — DIPHENHYDRAMINE HCL 25 MG
50 CAPSULE ORAL
Status: CANCELLED | OUTPATIENT
Start: 2024-12-09

## 2024-12-09 RX ORDER — METHYLPREDNISOLONE SODIUM SUCCINATE 125 MG/2ML
125 INJECTION INTRAMUSCULAR; INTRAVENOUS
Status: CANCELLED
Start: 2024-12-09

## 2024-12-09 RX ORDER — DIPHENHYDRAMINE HYDROCHLORIDE 50 MG/ML
50 INJECTION, SOLUTION INTRAMUSCULAR; INTRAVENOUS
Status: CANCELLED
Start: 2024-12-09

## 2024-12-09 RX ORDER — METHYLPREDNISOLONE SODIUM SUCCINATE 40 MG/ML
40 INJECTION INTRAMUSCULAR; INTRAVENOUS
Start: 2024-12-16

## 2024-12-09 RX ORDER — MEPERIDINE HYDROCHLORIDE 25 MG/ML
25 INJECTION INTRAMUSCULAR; INTRAVENOUS; SUBCUTANEOUS EVERY 30 MIN PRN
Status: CANCELLED | OUTPATIENT
Start: 2024-12-09

## 2024-12-09 RX ORDER — DIPHENHYDRAMINE HYDROCHLORIDE 50 MG/ML
50 INJECTION INTRAMUSCULAR; INTRAVENOUS
Start: 2024-12-16

## 2024-12-09 RX ORDER — ALBUTEROL SULFATE 90 UG/1
1-2 INHALANT RESPIRATORY (INHALATION)
Start: 2024-12-16

## 2024-12-09 RX ORDER — ACETAMINOPHEN 325 MG/1
650 TABLET ORAL ONCE
Status: CANCELLED | OUTPATIENT
Start: 2024-12-09

## 2024-12-09 RX ORDER — HEPARIN SODIUM (PORCINE) LOCK FLUSH IV SOLN 100 UNIT/ML 100 UNIT/ML
5 SOLUTION INTRAVENOUS
OUTPATIENT
Start: 2024-12-16

## 2024-12-09 RX ORDER — MEPERIDINE HYDROCHLORIDE 25 MG/ML
25 INJECTION INTRAMUSCULAR; INTRAVENOUS; SUBCUTANEOUS
OUTPATIENT
Start: 2024-12-16

## 2024-12-09 RX ORDER — HEPARIN SODIUM,PORCINE 10 UNIT/ML
5-20 VIAL (ML) INTRAVENOUS DAILY PRN
OUTPATIENT
Start: 2024-12-16

## 2024-12-09 RX ORDER — DIPHENHYDRAMINE HCL 25 MG
50 CAPSULE ORAL ONCE
Status: CANCELLED | OUTPATIENT
Start: 2024-12-09

## 2024-12-09 RX ORDER — EPINEPHRINE 1 MG/ML
0.3 INJECTION, SOLUTION, CONCENTRATE INTRAVENOUS EVERY 5 MIN PRN
OUTPATIENT
Start: 2024-12-16

## 2024-12-09 RX ORDER — ALBUTEROL SULFATE 0.83 MG/ML
2.5 SOLUTION RESPIRATORY (INHALATION)
Status: CANCELLED | OUTPATIENT
Start: 2024-12-09

## 2024-12-09 RX ORDER — DIPHENHYDRAMINE HYDROCHLORIDE 50 MG/ML
25 INJECTION INTRAMUSCULAR; INTRAVENOUS
Start: 2024-12-16

## 2024-12-09 RX ORDER — DIPHENHYDRAMINE HCL 50 MG
50 CAPSULE ORAL ONCE
Start: 2024-12-16

## 2024-12-09 RX ORDER — ACETAMINOPHEN 325 MG/1
650 TABLET ORAL
Status: CANCELLED | OUTPATIENT
Start: 2024-12-09

## 2024-12-09 RX ORDER — HYDROCORTISONE SODIUM SUCCINATE 100 MG/2ML
100 INJECTION INTRAMUSCULAR; INTRAVENOUS
OUTPATIENT
Start: 2024-12-16

## 2024-12-09 RX ORDER — ACETAMINOPHEN 325 MG/1
650 TABLET ORAL ONCE
Start: 2024-12-16

## 2024-12-09 RX ORDER — MONTELUKAST SODIUM 10 MG/1
10 TABLET ORAL ONCE
Status: CANCELLED | OUTPATIENT
Start: 2024-12-09

## 2024-12-09 RX ORDER — ALBUTEROL SULFATE 0.83 MG/ML
2.5 SOLUTION RESPIRATORY (INHALATION)
OUTPATIENT
Start: 2024-12-16

## 2024-12-09 RX ORDER — DEXAMETHASONE 4 MG/1
20 TABLET ORAL ONCE
Status: CANCELLED | OUTPATIENT
Start: 2024-12-09

## 2024-12-09 RX ORDER — ALBUTEROL SULFATE 90 UG/1
1-2 INHALANT RESPIRATORY (INHALATION)
Status: CANCELLED
Start: 2024-12-09

## 2024-12-09 RX ORDER — LORAZEPAM 2 MG/ML
0.5 INJECTION INTRAMUSCULAR EVERY 4 HOURS PRN
Status: CANCELLED | OUTPATIENT
Start: 2024-12-09

## 2024-12-09 ASSESSMENT — ENCOUNTER SYMPTOMS
HEMATOLOGIC/LYMPHATIC NEGATIVE: 1
RHINORRHEA: 1
MUSCULOSKELETAL NEGATIVE: 1
SINUS PRESSURE: 1
PSYCHIATRIC NEGATIVE: 1
CARDIOVASCULAR NEGATIVE: 1
SINUS PAIN: 1
NEUROLOGICAL NEGATIVE: 1
ENDOCRINE NEGATIVE: 1
COUGH: 1
CONSTITUTIONAL NEGATIVE: 1
ALLERGIC/IMMUNOLOGIC NEGATIVE: 1
EYES NEGATIVE: 1
GASTROINTESTINAL NEGATIVE: 1

## 2024-12-09 ASSESSMENT — PAIN SCALES - GENERAL: PAINLEVEL_OUTOF10: NO PAIN (0)

## 2024-12-09 NOTE — NURSING NOTE
"Oncology Rooming Note    December 9, 2024 1:06 PM   Carmelita Watts is a 74 year old female who presents for:    Chief Complaint   Patient presents with    Oncology Clinic Visit     Follow up Multiple Myeloma     Initial Vitals: /60   Pulse 88   Temp 97.1  F (36.2  C) (Tympanic)   Resp 20   Ht 1.6 m (5' 3\")   Wt 79.8 kg (175 lb 14.8 oz)   SpO2 97%   BMI 31.16 kg/m   Estimated body mass index is 31.16 kg/m  as calculated from the following:    Height as of this encounter: 1.6 m (5' 3\").    Weight as of this encounter: 79.8 kg (175 lb 14.8 oz). Body surface area is 1.88 meters squared.  No Pain (0) Comment: Data Unavailable   No LMP recorded. Patient is postmenopausal.  Allergies reviewed: Yes  Medications reviewed: Yes    Medications: Medication refills not needed today.  Pharmacy name entered into [facility]:    [id number] - [place], [place] - [address]  [place] [facility] [place] [facility] [address]  [facility] - [place], [place] - [address]    Frailty Screening:   Is the patient here for a new oncology consult visit in cancer care? 2. No            Vilma Chi LPN             "

## 2024-12-10 NOTE — PROGRESS NOTES
[facility] Hematology and Oncology Progress Note    Patient: Carmelita Watts  MRN: [id number]  Date of Service: Dec 9, 2024         Reason for Visit    Chief Complaint   Patient presents with    Oncology Clinic Visit     Follow up Multiple Myeloma       ECOG Performance Status: 0      Encounter Diagnoses: IgA multiple myeloma with biochemical recurrence      History of Present Illness:    Ms. Carmelita Watts returns for follow-up of IgA multiple myeloma with biochemical recurrence.  For details of history see previous notes.  Patient had progressed on Velcade/Decadron. she was started on CyBorD with Velcade given at a dose of 1.5 mg/m2 weekly on days 1, 4, 8, 15 and 22 of a 28-day regimen. Cytoxan was given at a dose 140 mg/m2 weekly or 200 mg weekly on days 1, 4, 8, 15, and 22. She received reduced doses of Cytoxan based on immunosuppressive state. Also was continued on Zovirax prophylaxis. Dexamethasone was given 40 mg weekly on days 1, 4, 8, 15 and 22. After 1 cycle, her M spike had dropped as well as her IgG level. The patient completed 2 cycles CyBorD with Cytoxan being dosed reduced to 150 mg every 7 days. Nonetheless, her M spike dropped from 6.2 down to 1.2. She completed 35 cycles and was followed by Bonita Quintana, nurse practitioner. Subsequently, M spike remained 1. It was elected to initiate Darzalex or Faspro 1800 mg subcutaneously per protocol. Subsequently, on 03/23/2021, her M spike had increased from 1 to 1.2 and it was decided to add Velcade and dexamethasone to the plan. Started on Darzalex 1800 mg subcutaneously as per protocol and Velcade 1.3 mg/m2, dexamethasone given on days 1, 4, 8 and 11 every 21 days. Zometa was given 4 mg every 3 months. M-spike did drop after cycle 9 to 0.6 and after cycle 12 it dropped to 0.4. She received a total of 26 cycles of daratumumab, Velcade and dex and slight drop to 0.3. In 04/2022, it was decided to put the patient on treatment holiday. She received  Chele on 05/27/2022 while on treatment holiday. She was recently seen by Nida Eastman. It was noted that her M spike had increased from 0.4 to 0.8 within 2 months. Given the fact that this was evidence of biochemical recurrence, we wanted to rule out end-organ damage by obtaining a PET scan. This was done on 08/02/2022 and it was read as a negative study. There was no suspicious hypermetabolism present. There was no evidence of mass or lymphadenopathy. No concerning hypermetabolism was present. There was a well-defined region with osteolysis involving the left anterior vertebral body of L3.Given the fact she only had a biochemical recurrence elected to start her on single agent daratumumab subcutaneously at dose 1800 mg subcu on days 1, day 8, day 15, day 22 for the first 2 cycles and then transition to daratumumab subcutaneously on days 1 and 15 of a 28-day cycleDid initially respond and dropped from 0.8-0.2.  Cycle 7 was transition to daratumumab subcutaneously given on day 1 of a 28-day cycle.  At that point her M spike is only increased from 0.2-0.8.  She was seen by Nida Hebert nurse practitioner and we recommended PET scan for further evaluation.  PET scan was performed on March 27, 2024 and the findings were that there was a lytic lesion with well-defined sclerotic margins involving the right side of the L3 vertebral body there was no associated hypermetabolism there was no evidence of other endorgan damage. Nonetheless she has developed biochemical recurrence despite single agent daratumumab. Only saw her in April of this year we elected to add Pomalyst and dexamethasone to daratumumab therefore recommended subcutaneous daratumumab given on day 1, day 8, day 15, and day 22 with addition of Pomalyst 1 mg p.o. daily on days 1 through 21 to begin with cycle 2.She went on to complete 9 cycles of Darzalex/Pomalyst/Dex with a positive response with M spike dropping to 0.1.  She tolerated therapy well  without any significant side effects.  She was seen in the emergency room on December 1 with complaints of productive cough, sinus congestion, ear pain.  It was noted the patient had drainage from the left ear with was due to a chronic perforation surgery empirically started taking ofloxacin drops.  She was diagnosed with acute left otitis media for chronic perforation as well as acute bronchitis/sinusitis she was started on Augmentin 875 mg p.o. twice daily with plans to complete a 10-day course.  She is now here to proceed with cycle 10 of therapy.  She states she still has left ear pain with minimal drainage from the left ear she also continues to have slight cough occasionally productive of sputum.  She will completed 10-day course of Augmentin on December 11.  She states she has had chronic ear infections requiring eardrops.  Of note her IgG level has been slowly dropping and was 375 on on December 2.  She comes in today with ongoing cough occasionally productive of sputum.  She continues to have left ear pain.  She denies shortness of breath or hemoptysis.  She denies abdominal pain or change in bowel habits.  She denies bright red blood per rectum hematemesis or melena.  She continues on Zometa every 3 months for history of lytic lesions.  She denies hematuria or flank pain.  She denies significant headaches.  Does still have some chronic nasal congestion.      ______________________________________________________________________________    Past History    Past Medical History:   Diagnosis Date    Arthritis     Cyst of breast     Depressive disorder     Diabetes mellitus, type 2 (H) 01/18/2021    Essential hypertension 10/01/2015    Major depressive disorder, recurrent episode, mild (H) 10/01/2015    Mixed hyperlipidemia 10/01/2015    Multiple myeloma not having achieved remission (H) 06/24/2019    Other specified disorders of bladder 07/09/2012    Irritable Bladder    Seasonal allergies 10/01/2015     "Unspecified essential hypertension 03/19/2007    Unspecified sinusitis (chronic) 09/05/2007       Past Surgical History:   Procedure Laterality Date    APPENDECTOMY      BONE MARROW BIOPSY, BONE SPECIMEN, NEEDLE/TROCAR N/A 06/18/2019    Procedure: BONE MARROW BIOPSY;  Surgeon: Maciej Sanz MD;  Location: [facility] OR    CHOLECYSTECTOMY      COLONOSCOPY  07/18/2006    repeat 10 years    COLONOSCOPY N/A 12/30/2016    Procedure: COLONOSCOPY;  Surgeon: Bhaskar Franklin DO;  Location: [facility] OR    PUNC/ASPIR BREAST CYST Left 1995    benign    SINUS SURGERY      TUBAL STERILIZATION         Review of Systems   Constitutional: Negative.    HENT:  Positive for ear discharge (Associated with left ear pain), rhinorrhea, sinus pressure and sinus pain.    Eyes: Negative.    Respiratory:  Positive for cough.    Cardiovascular: Negative.    Gastrointestinal: Negative.    Endocrine: Negative.    Genitourinary: Negative.    Musculoskeletal: Negative.    Skin: Negative.    Allergic/Immunologic: Negative.    Neurological: Negative.    Hematological: Negative.    Psychiatric/Behavioral: Negative.     All other systems reviewed and are negative.         Physical Exam    /60   Pulse 88   Temp 97.1  F (36.2  C) (Tympanic)   Resp 20   Ht 1.6 m (5' 3\")   Wt 79.8 kg (175 lb 14.8 oz)   SpO2 97%   BMI 31.16 kg/m      Physical Exam  Vitals and nursing note reviewed.   Constitutional:       Appearance: Normal appearance. She is normal weight.   HENT:      Head: Normocephalic and atraumatic.      Comments: Positive left tympanic membrane perforation with drainage with associated erythema involving the TM.     Nose: Congestion present.      Mouth/Throat:      Pharynx: Oropharynx is clear.      Comments: Positive postnasal drip  Eyes:      Extraocular Movements: Extraocular movements intact.      Conjunctiva/sclera: Conjunctivae normal.      Pupils: Pupils are equal, round, and reactive to light.   Cardiovascular:      Rate and Rhythm: " Normal rate and regular rhythm.      Pulses: Normal pulses.      Heart sounds: Normal heart sounds. No murmur heard.  Pulmonary:      Effort: Pulmonary effort is normal.      Breath sounds: Normal breath sounds.      Comments: There are positive upper airway sounds noted bilaterally left greater than right  Abdominal:      General: Bowel sounds are normal. There is distension.      Palpations: Abdomen is soft. There is no mass.      Tenderness: There is no abdominal tenderness.   Musculoskeletal:         General: Normal range of motion.      Cervical back: Normal range of motion and neck supple.      Right lower leg: No edema.      Left lower leg: No edema.      Comments: No ankle edema.   Lymphadenopathy:      Cervical: No cervical adenopathy.   Skin:     General: Skin is warm and dry.   Neurological:      General: No focal deficit present.      Mental Status: She is alert and oriented to person, place, and time.   Psychiatric:         Mood and Affect: Mood normal.         Behavior: Behavior normal.          Lab Results    Recent Results (from the past 240 hours)   Comprehensive metabolic panel    Collection Time: 12/02/24 11:40 AM   Result Value Ref Range    Sodium 140 135 - 145 mmol/L    Potassium 3.9 3.4 - 5.3 mmol/L    Carbon Dioxide (CO2) 22 22 - 29 mmol/L    Anion Gap 15 7 - 15 mmol/L    Urea Nitrogen 14.7 8.0 - 23.0 mg/dL    Creatinine 0.84 0.51 - 0.95 mg/dL    GFR Estimate 72 >60 mL/min/1.73m2    Calcium 9.0 8.8 - 10.4 mg/dL    Chloride 103 98 - 107 mmol/L    Glucose 102 (H) 70 - 99 mg/dL    Alkaline Phosphatase 143 40 - 150 U/L    AST 16 0 - 45 U/L    ALT 14 0 - 50 U/L    Protein Total 6.5 6.4 - 8.3 g/dL    Albumin 4.2 3.5 - 5.2 g/dL    Bilirubin Total 0.4 <=1.2 mg/dL   Beta 2 microglobulin    Collection Time: 12/02/24 11:40 AM   Result Value Ref Range    Beta-2-Microglobulin 2.8 (H) <2.3 mg/L   Immunoglobulins A G and M    Collection Time: 12/02/24 11:40 AM   Result Value Ref Range    Immunoglobulin G 375  (L) 610 - 1,616 mg/dL    Immunoglobulin A 42 (L) 84 - 499 mg/dL    Immunoglobulin M 36 35 - 242 mg/dL   Kappa and lambda light chain    Collection Time: 12/02/24 11:40 AM   Result Value Ref Range    Kappa Free Light Chains 0.87 0.33 - 1.94 mg/dL    Lambda Free Light Chains 0.59 0.57 - 2.63 mg/dL    Kappa /Lambda Ratio 1.47 0.26 - 1.65   Macroglobulins    Collection Time: 12/02/24 11:40 AM   Result Value Ref Range    Viscosity Index 1.5 1.4 - 1.8   Protein Immunofixation Serum    Collection Time: 12/02/24 11:40 AM   Result Value Ref Range    Immunofixation ELP       Monoclonal IgG immunoglobulin of kappa light chain type. Monoclonal antibody therapeutics (e.g. Daratumumab) may appear as monoclonal proteins on serum electrophoresis and immunofixation. Results should be interpreted with caution. Pathologic significance requires clinical correlation.  Clair Lyons M.D., Ph.D.    Signout Location if Remote [facility]    CBC with platelets and differential    Collection Time: 12/02/24 11:40 AM   Result Value Ref Range    WBC Count 4.4 4.0 - 11.0 10e3/uL    RBC Count 4.50 3.80 - 5.20 10e6/uL    Hemoglobin 13.3 11.7 - 15.7 g/dL    Hematocrit 40.0 35.0 - 47.0 %    MCV 89 78 - 100 fL    MCH 29.6 26.5 - 33.0 pg    MCHC 33.3 31.5 - 36.5 g/dL    RDW 16.6 (H) 10.0 - 15.0 %    Platelet Count 181 150 - 450 10e3/uL    % Neutrophils 28 %    % Lymphocytes 42 %    % Monocytes 18 %    % Eosinophils 10 %    % Basophils 2 %    % Immature Granulocytes 1 %    NRBCs per 100 WBC 0 <1 /100    Absolute Neutrophils 1.3 (L) 1.6 - 8.3 10e3/uL    Absolute Lymphocytes 1.8 0.8 - 5.3 10e3/uL    Absolute Monocytes 0.8 0.0 - 1.3 10e3/uL    Absolute Eosinophils 0.5 0.0 - 0.7 10e3/uL    Absolute Basophils 0.1 0.0 - 0.2 10e3/uL    Absolute Immature Granulocytes 0.0 <=0.4 10e3/uL    Absolute NRBCs 0.0 10e3/uL   Protein Electrophoresis Serum with Reflex    Collection Time: 12/02/24 11:40 AM   Result Value Ref Range    Albumin 3.8 3.7 - 5.1 g/dL    Alpha 1 0.3 0.2 -  0.4 g/dL    Alpha 2 0.9 0.5 - 0.9 g/dL    Beta Globulin 0.7 0.6 - 1.0 g/dL    Gamma Globulin 0.4 (L) 0.7 - 1.6 g/dL    Monoclonal Peak 0.1 (H) <=0.0 g/dL    ELP Interpretation       Very small monoclonal protein (0.1 g/dL) seen in the gamma fraction. See immunofixation report on same specimen. Hypogammaglobulinemia. Pathologic significance requires clinical correlation. Clair Lyons M.D., Ph.D.   Total Protein, Serum for ELP    Collection Time: 12/02/24 11:40 AM   Result Value Ref Range    Total Protein Serum for ELP 6.1 (L) 6.4 - 8.3 g/dL   CBC with platelets and differential    Collection Time: 12/09/24 12:04 PM   Result Value Ref Range    WBC Count 6.0 4.0 - 11.0 10e3/uL    RBC Count 4.44 3.80 - 5.20 10e6/uL    Hemoglobin 13.1 11.7 - 15.7 g/dL    Hematocrit 39.8 35.0 - 47.0 %    MCV 90 78 - 100 fL    MCH 29.5 26.5 - 33.0 pg    MCHC 32.9 31.5 - 36.5 g/dL    RDW 16.5 (H) 10.0 - 15.0 %    Platelet Count 225 150 - 450 10e3/uL    % Neutrophils 35 %    % Lymphocytes 46 %    % Monocytes 14 %    % Eosinophils 3 %    % Basophils 2 %    % Immature Granulocytes 0 %    NRBCs per 100 WBC 0 <1 /100    Absolute Neutrophils 2.1 1.6 - 8.3 10e3/uL    Absolute Lymphocytes 2.8 0.8 - 5.3 10e3/uL    Absolute Monocytes 0.8 0.0 - 1.3 10e3/uL    Absolute Eosinophils 0.2 0.0 - 0.7 10e3/uL    Absolute Basophils 0.1 0.0 - 0.2 10e3/uL    Absolute Immature Granulocytes 0.0 <=0.4 10e3/uL    Absolute NRBCs 0.0 10e3/uL       Imaging    No results found.    Assessment and Plan: #1 :.  Stage II IgA kappa multiple myeloma with 80% involvement of bone marrow with standard risk cytogenetics: Status post Decadron/Velcade, patient refuses stem cell transplant evaluation status post initial response and then subsequent biochemical progression status post CyBorD initiated in October 2019 with M spike dropping from 6.2 down to 1.2 with subsequent biochemical progression patient was started on single agent daratumumab subcutaneously with subsequent  progression with rising M spike after cycle 7, she was switched to subcutaneous daratumumab/Darzalex/pomalidomide/dexamethasone with positive response with drop in M spike down to 0.1 after 9 cycles.  Course complicated by left otitis media/sinusitis/acute bronchitis requiring antibiotics with persistent but improved symptoms after 7 days of Augmentin exam reveals persistent left otitis media plan is to hold Darzalex/pomalidomide/Dex which would be cycle 10 we will resume 1 week after completion of a total of 14 days of Augmentin we will therefore prescribe an extra 4 days of Augmentin to be began on December 12.  2.  Hypogammaglobulinemia: Patient has had recurrent otitis media/externa in the setting of a perforated left tympanic membranes and recent acute episode of sinusitis/bronchitis.  Her IgG level has dropped to 375 we will therefore recommend IVIG and a dose of 0.4 mg/kg given q. 28 days IV for IgG level of less than 600.  Will start this next week.  Otherwise we will see the patient with cycle 11 of Darzalex/pomalidomide/Dex.  Time spent: 62 minutes was planets patient visit: We spent enormous amount of reviewing multiple physician prior notes including nurse practitioner notes and emergency department physician notes, discussing lab results the patient, performing history/physical, documenting history and physical, and ordering IV gammaglobulin and postponing cycle 10 Darzalex/9/Dex to be given next Monday which would be December 16.  The longitudinal plan of care for the diagnosis(es)/condition(s) as documented were addressed during this visit. Due to the added complexity in care, I will continue to support Carmelita in the subsequent management and with ongoing continuity of care.    Cancer Staging   No matching staging information was found for the patient.        Signed by: Elías Walker MD    CC: [email or web address] Trevino

## 2024-12-11 ENCOUNTER — DOCUMENTATION ONLY (OUTPATIENT)
Dept: PHARMACY | Facility: CLINIC | Age: 74
End: 2024-12-11
Payer: COMMERCIAL

## 2024-12-11 NOTE — PROGRESS NOTES
Pharmacist IVIG Stewardship Program    Diagnosis: Hypogammaglobulinemia   Date  12/2/2024   IgG Level (mg/dL) 375     Current Dosing Regimen: Privigen 20g IV every 4 weeks PRN for IgG <600 mg/dL   Patient is dosed as needed based on an IgG level of less than 600 mg/dL to ensure the lowest amount of medication is administered to achieve beneficial outcomes.  Pre-medications:   Acetaminophen 650 mg by mouth, 30 minutes prior to infusion  Diphenhydramine 50 mg by mouth, 30 minutes prior to infusion  Hydrocortisone 100 mg IV PRN for previous reactions to IVIG (per chart review patient has not had IVIG before)  Current Titration Regimen: Start infusion at 0.5 ml/kg/hr x 15 min. If tolerated, increase rate by 0.5 ml/kg/hr every 15 min to a maximum of 4 ml/kg/hr.  Previously Tried Products: None     Side Effects: Unable to reach patient to discuss.     Interventions: Lab review completed.     Assessment:   Date  12/2/2024   IgG Level (mg/dL) 375   Patient is appropriate for IVIG therapy when their level is within parameter. IVIG infusions are effective in increasing IgG levels to an appropriate level to minimize patient's risk of infection. Patient should continue on treatment as they have been per provider order. Without therapy their levels would put them at an increased risk of infections.    Plan: Patient is okay to proceed with IVIG infusion on 12/16/2024. Patient will have additional labs drawn next month to assess the need for ongoing infusions.     Next Review Needed: 1/2025    Karen Menon, PharmD, IgCP  Medication Access Pharmacist

## 2024-12-16 ENCOUNTER — INFUSION THERAPY VISIT (OUTPATIENT)
Dept: INFUSION THERAPY | Facility: OTHER | Age: 74
End: 2024-12-16
Attending: INTERNAL MEDICINE
Payer: MEDICARE

## 2024-12-16 ENCOUNTER — DOCUMENTATION ONLY (OUTPATIENT)
Dept: ONCOLOGY | Facility: OTHER | Age: 74
End: 2024-12-16

## 2024-12-16 VITALS
DIASTOLIC BLOOD PRESSURE: 81 MMHG | SYSTOLIC BLOOD PRESSURE: 134 MMHG | WEIGHT: 176 LBS | TEMPERATURE: 98.7 F | BODY MASS INDEX: 31.18 KG/M2 | HEART RATE: 68 BPM | OXYGEN SATURATION: 95 % | HEIGHT: 63 IN

## 2024-12-16 DIAGNOSIS — C90.00 MULTIPLE MYELOMA NOT HAVING ACHIEVED REMISSION (H): Primary | ICD-10-CM

## 2024-12-16 DIAGNOSIS — D80.1 HYPOGAMMAGLOBULINEMIA (H): ICD-10-CM

## 2024-12-16 LAB
ALBUMIN SERPL BCG-MCNC: 4.1 G/DL (ref 3.5–5.2)
ALP SERPL-CCNC: 146 U/L (ref 40–150)
ALT SERPL W P-5'-P-CCNC: 11 U/L (ref 0–50)
ANION GAP SERPL CALCULATED.3IONS-SCNC: 9 MMOL/L (ref 7–15)
AST SERPL W P-5'-P-CCNC: 16 U/L (ref 0–45)
BASOPHILS # BLD AUTO: 0.1 10E3/UL (ref 0–0.2)
BASOPHILS NFR BLD AUTO: 2 %
BILIRUB SERPL-MCNC: 0.3 MG/DL
BUN SERPL-MCNC: 13.3 MG/DL (ref 8–23)
CALCIUM SERPL-MCNC: 9.5 MG/DL (ref 8.8–10.4)
CHLORIDE SERPL-SCNC: 104 MMOL/L (ref 98–107)
CREAT SERPL-MCNC: 0.88 MG/DL (ref 0.51–0.95)
EGFRCR SERPLBLD CKD-EPI 2021: 68 ML/MIN/1.73M2
EOSINOPHIL # BLD AUTO: 0.2 10E3/UL (ref 0–0.7)
EOSINOPHIL NFR BLD AUTO: 3 %
ERYTHROCYTE [DISTWIDTH] IN BLOOD BY AUTOMATED COUNT: 16.5 % (ref 10–15)
GLUCOSE SERPL-MCNC: 131 MG/DL (ref 70–99)
HCO3 SERPL-SCNC: 24 MMOL/L (ref 22–29)
HCT VFR BLD AUTO: 38.6 % (ref 35–47)
HGB BLD-MCNC: 13.1 G/DL (ref 11.7–15.7)
IMM GRANULOCYTES # BLD: 0 10E3/UL
IMM GRANULOCYTES NFR BLD: 1 %
LYMPHOCYTES # BLD AUTO: 2.3 10E3/UL (ref 0.8–5.3)
LYMPHOCYTES NFR BLD AUTO: 47 %
MCH RBC QN AUTO: 30 PG (ref 26.5–33)
MCHC RBC AUTO-ENTMCNC: 33.9 G/DL (ref 31.5–36.5)
MCV RBC AUTO: 89 FL (ref 78–100)
MONOCYTES # BLD AUTO: 0.4 10E3/UL (ref 0–1.3)
MONOCYTES NFR BLD AUTO: 9 %
NEUTROPHILS # BLD AUTO: 1.9 10E3/UL (ref 1.6–8.3)
NEUTROPHILS NFR BLD AUTO: 39 %
NRBC # BLD AUTO: 0 10E3/UL
NRBC BLD AUTO-RTO: 0 /100
PLATELET # BLD AUTO: 276 10E3/UL (ref 150–450)
POTASSIUM SERPL-SCNC: 4.1 MMOL/L (ref 3.4–5.3)
PROT SERPL-MCNC: 6.2 G/DL (ref 6.4–8.3)
RBC # BLD AUTO: 4.36 10E6/UL (ref 3.8–5.2)
SODIUM SERPL-SCNC: 137 MMOL/L (ref 135–145)
WBC # BLD AUTO: 5 10E3/UL (ref 4–11)

## 2024-12-16 PROCEDURE — 96365 THER/PROPH/DIAG IV INF INIT: CPT

## 2024-12-16 PROCEDURE — 96375 TX/PRO/DX INJ NEW DRUG ADDON: CPT

## 2024-12-16 PROCEDURE — 36415 COLL VENOUS BLD VENIPUNCTURE: CPT | Performed by: INTERNAL MEDICINE

## 2024-12-16 PROCEDURE — 258N000003 HC RX IP 258 OP 636: Mod: JZ | Performed by: INTERNAL MEDICINE

## 2024-12-16 PROCEDURE — 84155 ASSAY OF PROTEIN SERUM: CPT | Mod: ZL | Performed by: INTERNAL MEDICINE

## 2024-12-16 PROCEDURE — 85004 AUTOMATED DIFF WBC COUNT: CPT | Mod: ZL | Performed by: INTERNAL MEDICINE

## 2024-12-16 PROCEDURE — 96366 THER/PROPH/DIAG IV INF ADDON: CPT

## 2024-12-16 PROCEDURE — 250N000011 HC RX IP 250 OP 636: Mod: JZ | Performed by: INTERNAL MEDICINE

## 2024-12-16 PROCEDURE — 96401 CHEMO ANTI-NEOPL SQ/IM: CPT

## 2024-12-16 RX ORDER — DEXAMETHASONE SODIUM PHOSPHATE 10 MG/ML
20 INJECTION INTRAMUSCULAR; INTRAVENOUS ONCE
Status: COMPLETED | OUTPATIENT
Start: 2024-12-16 | End: 2024-12-16

## 2024-12-16 RX ORDER — MEPERIDINE HYDROCHLORIDE 25 MG/ML
25 INJECTION INTRAMUSCULAR; INTRAVENOUS; SUBCUTANEOUS
OUTPATIENT
Start: 2025-03-10

## 2024-12-16 RX ORDER — DIPHENHYDRAMINE HCL 50 MG
50 CAPSULE ORAL ONCE
Status: COMPLETED | OUTPATIENT
Start: 2024-12-16 | End: 2024-12-16

## 2024-12-16 RX ORDER — ACETAMINOPHEN 325 MG/1
650 TABLET ORAL ONCE
Start: 2025-03-10

## 2024-12-16 RX ORDER — DIPHENHYDRAMINE HYDROCHLORIDE 50 MG/ML
50 INJECTION INTRAMUSCULAR; INTRAVENOUS
Start: 2025-03-10

## 2024-12-16 RX ORDER — HEPARIN SODIUM (PORCINE) LOCK FLUSH IV SOLN 100 UNIT/ML 100 UNIT/ML
5 SOLUTION INTRAVENOUS
OUTPATIENT
Start: 2025-03-10

## 2024-12-16 RX ORDER — EPINEPHRINE 1 MG/ML
0.3 INJECTION, SOLUTION, CONCENTRATE INTRAVENOUS EVERY 5 MIN PRN
OUTPATIENT
Start: 2025-03-10

## 2024-12-16 RX ORDER — ALBUTEROL SULFATE 90 UG/1
1-2 INHALANT RESPIRATORY (INHALATION)
Start: 2025-03-10

## 2024-12-16 RX ORDER — METHYLPREDNISOLONE SODIUM SUCCINATE 40 MG/ML
40 INJECTION INTRAMUSCULAR; INTRAVENOUS
Start: 2025-03-10

## 2024-12-16 RX ORDER — ALBUTEROL SULFATE 0.83 MG/ML
2.5 SOLUTION RESPIRATORY (INHALATION)
OUTPATIENT
Start: 2025-03-10

## 2024-12-16 RX ORDER — DIPHENHYDRAMINE HCL 50 MG
50 CAPSULE ORAL ONCE
Start: 2025-03-10

## 2024-12-16 RX ORDER — ACETAMINOPHEN 325 MG/1
650 TABLET ORAL ONCE
Status: COMPLETED | OUTPATIENT
Start: 2024-12-16 | End: 2024-12-16

## 2024-12-16 RX ORDER — HEPARIN SODIUM,PORCINE 10 UNIT/ML
5-20 VIAL (ML) INTRAVENOUS DAILY PRN
OUTPATIENT
Start: 2025-03-10

## 2024-12-16 RX ORDER — DIPHENHYDRAMINE HYDROCHLORIDE 50 MG/ML
25 INJECTION INTRAMUSCULAR; INTRAVENOUS
Start: 2025-03-10

## 2024-12-16 RX ORDER — MONTELUKAST SODIUM 10 MG/1
10 TABLET ORAL ONCE
Status: COMPLETED | OUTPATIENT
Start: 2024-12-16 | End: 2024-12-16

## 2024-12-16 RX ORDER — HYDROCORTISONE SODIUM SUCCINATE 100 MG/2ML
100 INJECTION INTRAMUSCULAR; INTRAVENOUS
OUTPATIENT
Start: 2025-03-10

## 2024-12-16 RX ADMIN — HUMAN IMMUNOGLOBULIN G 20 G: 20 LIQUID INTRAVENOUS at 10:14

## 2024-12-16 RX ADMIN — Medication 50 MG: at 09:18

## 2024-12-16 RX ADMIN — DARATUMUMAB AND HYALURONIDASE-FIHJ (HUMAN RECOMBINANT) 1800 MG: 1800; 30000 INJECTION SUBCUTANEOUS at 12:15

## 2024-12-16 RX ADMIN — FAMOTIDINE 20 MG: 10 INJECTION, SOLUTION INTRAVENOUS at 09:28

## 2024-12-16 RX ADMIN — ACETAMINOPHEN 650 MG: 325 TABLET ORAL at 09:18

## 2024-12-16 RX ADMIN — SODIUM CHLORIDE 250 ML: 9 INJECTION, SOLUTION INTRAVENOUS at 10:13

## 2024-12-16 RX ADMIN — MONTELUKAST SODIUM 10 MG: 10 TABLET ORAL at 09:18

## 2024-12-16 RX ADMIN — DEXAMETHASONE SODIUM PHOSPHATE 20 MG: 10 INJECTION INTRAMUSCULAR; INTRAVENOUS at 09:32

## 2024-12-16 ASSESSMENT — PAIN SCALES - GENERAL: PAINLEVEL_OUTOF10: NO PAIN (0)

## 2024-12-16 NOTE — PROGRESS NOTES
Clinic Administered Medication Documentation      Injectable Medication Documentation    Is there an active order (written within the past 365 days, with administrations remaining, not ) in the chart? Yes.     Patient was given  Faspro . Prior to medication administration, verified patient's identity using patient s name and date of birth. Please see MAR and medication order for additional information. Patient instructed to  to report symptoms to provider .    Vial/Syringe: Syringe  Was this medication supplied by the patient? No  Is this a medication the patient will need to receive again? Yes. Verified that the patient has refills remaining in their prescription.

## 2024-12-16 NOTE — PROGRESS NOTES
Infusion Nursing Note:  Carmelita Watts presents today for IVIG and faspro.    Patient seen by provider today: No   present during visit today: Not Applicable.    Note: Patient seen last week and deferred till today due to Ear infection.  Patient finished ABX and feels that ear infection has resolved.  .      Intravenous Access:  Peripheral IV placed.    Treatment Conditions:  Lab Results   Component Value Date    HGB 13.1 12/16/2024    WBC 5.0 12/16/2024    ANEU 8.8 (H) 08/06/2024    ANEUTAUTO 1.9 12/16/2024     12/16/2024        Lab Results   Component Value Date     12/16/2024    POTASSIUM 4.1 12/16/2024    CR 0.88 12/16/2024    PAYAL 9.5 12/16/2024    BILITOTAL 0.3 12/16/2024    ALBUMIN 4.1 12/16/2024    ALT 11 12/16/2024    AST 16 12/16/2024       Results reviewed, labs MET treatment parameters, ok to proceed with treatment.      Post Infusion Assessment:  Patient tolerated infusion without incident.  Blood return noted pre and post infusion.  No evidence of extravasations.  Access discontinued per protocol.       Discharge Plan:   Patient and/or family verbalized understanding of discharge instructions and all questions answered.  AVS to patient via [employer].  Patient will return monthly for next appointment.   Patient discharged in stable condition accompanied by: self.  Departure Mode: Ambulatory.      Anne Lan RN

## 2024-12-26 DIAGNOSIS — I10 ESSENTIAL HYPERTENSION WITH GOAL BLOOD PRESSURE LESS THAN 140/90: ICD-10-CM

## 2024-12-26 RX ORDER — LOSARTAN POTASSIUM 50 MG/1
50 TABLET ORAL DAILY
Qty: 90 TABLET | Refills: 0 | Status: SHIPPED | OUTPATIENT
Start: 2024-12-26

## 2024-12-31 NOTE — PROGRESS NOTES
Assessment & Plan     Encounter to establish care  followed with Kenyatta Trujillo, with last visit 11/27/2023    Type 2 diabetes mellitus with hyperosmolarity without coma, without long-term current use of insulin (H)  A1c today of 6.5  - Hemoglobin A1c  - Albumin Random Urine Quantitative with Creat Ratio  - no diabetic medications    Hypertension goal BP (blood pressure) < 140/80  BP at goal. Cr/gfr wnl  - c/w losartan 50mg     Mixed hyperlipidemia  LDL steady today at 110, cholesterol 185  - Lipid Profile  - c/w atorvastatin     Hypogammaglobulinemia (H) / Multiple myeloma not having achieved remission (H)  Follows with Dr Walker with last visit 12/9/2024. Note reviewed. Follow up with Nida on 1/14/2025  - currently on daratumumab/darzalex/pomalidomide / dexamethasone -> currently on cycle 11  - issues with left otitis media, sinusitis, bronchitis -> Rx augmentin between cycles. Follows with ENT every 6 months  - on IVIG for hypogammaglobulineamia  - on ASA d/t pomalyst     Major depressive disorder, recurrent episode, mild (H)  Mood stable and reasonable   - c/w sertaline       The longitudinal plan of care for the diagnosis(es)/condition(s) as documented were addressed during this visit. Due to the added complexity in care, I will continue to support Carmelita in the subsequent management and with ongoing continuity of care.        See Patient Instructions    Return in about 6 months (around 7/7/2025) for CDM.    Lin Mae is a 74 year old, presenting for the following health issues:  Anxiety, Depression, Lipids, and Hypertension        1/7/2025     7:51 AM   Additional Questions   Roomed by Binta Hadley   Accompanied by self     History of Present Illness       Mental Health Follow-up:  Patient presents to follow-up on Depression.Patient's depression since last visit has been:  Good  The patient is not having other symptoms associated with depression.      Any significant life events: grief  or loss and health concerns  Patient is not feeling anxious or having panic attacks.  Patient has no concerns about alcohol or drug use.    Hyperlipidemia:  She presents for follow up of hyperlipidemia.   She is taking medication to lower cholesterol. She is not having myalgia or other side effects to statin medications.    Hypertension: She presents for follow up of hypertension.  She does not check blood pressure  regularly outside of the clinic. Outpatient blood pressures have not been over 140/90. She does not follow a low salt diet.     Reason for visit:  Follow up on multiple myeloma    She eats 2-3 servings of fruits and vegetables daily.She consumes 0 sweetened beverage(s) daily.She exercises with enough effort to increase her heart rate 20 to 29 minutes per day.  She exercises with enough effort to increase her heart rate 5 days per week.   She is taking medications regularly.       Est care: followed with Kenyatta Trujillo, with last visit 11/27/2023    # MM: Stage II IgA kappa multiple myeloma with 80% involvement of bone marrow / hypogammaglobulineamia. Dx 2019  Follows with Dr Walker with last visit 12/9/2024. Note reviewed. Follow up with Nida on 1/14/2025  - s/p decadron / velcade,  declined steam cell transplant evaluation   - s/p daratumumab, with progression and changed to daratumumab/darzalex/pomalidomide / dexamethasone -> currently on cycle 11  - issues with left otitis media, sinusitis, bronchitis -> Rx augmentin  - on IVIG for hypogammaglobulineamia  - labs today   - on ASA d/t pomalyst     - follows with ENT every 6 months for left otitis media     # DM2  - on dexamethasone with chemo      Hyperlipidemia Follow-Up    Are you regularly taking any medication or supplement to lower your cholesterol?   Yes- Lipitor  Are you having muscle aches or other side effects that you think could be caused by your cholesterol lowering medication?  No  - atorvastatin     Hypertension Follow-up    Do you  check your blood pressure regularly outside of the clinic? Yes   Are you following a low salt diet? No  Are your blood pressures ever more than 140 on the top number (systolic) OR more   than 90 on the bottom number (diastolic), for example 140/90? No    BP Readings from Last 3 Encounters:   01/07/25 132/74   12/16/24 134/81   12/09/24 110/60         Depression and Anxiety   How are you doing with your depression since your last visit? Improved   How are you doing with your anxiety since your last visit?  Improved   Are you having other symptoms that might be associated with depression or anxiety? No  Have you had a significant life event? Grief or Loss   Do you have any concerns with your use of alcohol or other drugs? No    - sertraline   - some down days    Social History     Tobacco Use    Smoking status: Never    Smokeless tobacco: Never    Tobacco comments:     Tried to Quit (YES); QUIT in 1971; Passive Exposure (NO)   Vaping Use    Vaping status: Never Used   Substance Use Topics    Alcohol use: Yes     Comment: RARELY    Drug use: No         5/17/2023     8:04 AM 8/23/2023     7:52 AM 1/7/2025     7:42 AM   PHQ   PHQ-9 Total Score 1 3 7    Q9: Thoughts of better off dead/self-harm past 2 weeks Not at all Not at all  Not at all       Patient-reported    Proxy-reported         8/23/2023     7:53 AM 4/16/2024     8:26 AM 1/7/2025     7:43 AM   MINAL-7 SCORE   Total Score 1 (minimal anxiety)  1 (minimal anxiety)   Total Score 1 0 1        Patient-reported     }  #     # walks every day and does floor exercise     Review of Systems  Constitutional, HEENT, cardiovascular, pulmonary, gi and gu systems are negative, except as otherwise noted.      Objective    /74   Pulse 64   Temp 97.7  F (36.5  C) (Tympanic)   Resp 16   Wt 79.2 kg (174 lb 9.6 oz)   SpO2 98%   BMI 30.93 kg/m    Body mass index is 30.93 kg/m .  Physical Exam  Constitutional:       General: She is not in acute distress.     Appearance: She  is not ill-appearing.   Cardiovascular:      Rate and Rhythm: Normal rate and regular rhythm.      Heart sounds: No murmur heard.  Pulmonary:      Effort: Pulmonary effort is normal. No respiratory distress.      Breath sounds: No wheezing or rales.   Musculoskeletal:      Right lower leg: No edema.      Left lower leg: No edema.   Neurological:      Mental Status: She is alert.   Psychiatric:         Mood and Affect: Mood normal.          Results for orders placed or performed in visit on 01/07/25 (from the past 24 hours)   Hemoglobin A1c   Result Value Ref Range    Estimated Average Glucose 140 (H) <117 mg/dL    Hemoglobin A1C 6.5 (H) <5.7 %   Albumin Random Urine Quantitative with Creat Ratio   Result Value Ref Range    Creatinine Urine mg/dL 75.8 mg/dL    Albumin Urine mg/L <12.0 mg/L    Albumin Urine mg/g Cr     Lipid Profile   Result Value Ref Range    Cholesterol 185 <200 mg/dL    Triglycerides 125 <150 mg/dL    Direct Measure HDL 50 >=50 mg/dL    LDL Cholesterol Calculated 110 (H) <100 mg/dL    Non HDL Cholesterol 135 (H) <130 mg/dL    Patient Fasting > 8hrs? Yes     Narrative    Cholesterol  Desirable: < 200 mg/dL  Borderline High: 200 - 239 mg/dL  High: >= 240 mg/dL    Triglycerides  Normal: < 150 mg/dL  Borderline High: 150 - 199 mg/dL  High: 200-499 mg/dL  Very High: >= 500 mg/dL    Direct Measure HDL  Female: >= 50 mg/dL   Male: >= 40 mg/dL    LDL Cholesterol  Desirable: < 100 mg/dL  Above Desirable: 100 - 129 mg/dL   Borderline High: 130 - 159 mg/dL   High:  160 - 189 mg/dL   Very High: >= 190 mg/dL    Non HDL Cholesterol  Desirable: < 130 mg/dL  Above Desirable: 130 - 159 mg/dL  Borderline High: 160 - 189 mg/dL  High: 190 - 219 mg/dL  Very High: >= 220 mg/dL     *Note: Due to a large number of results and/or encounters for the requested time period, some results have not been displayed. A complete set of results can be found in Results Review.           Signed Electronically by: Elena Gold  MD

## 2025-01-06 ENCOUNTER — TELEPHONE (OUTPATIENT)
Dept: ONCOLOGY | Facility: CLINIC | Age: 75
End: 2025-01-06
Payer: COMMERCIAL

## 2025-01-06 NOTE — TELEPHONE ENCOUNTER
Oral Chemotherapy Monitoring Program     Medication: Pomalyst  Rx: 1mg PO daily on days 1 through 21 of 28 day cycle   REMS [id number]      Routine survey questions reviewed  Rx to be Escribed to SP

## 2025-01-07 ENCOUNTER — OFFICE VISIT (OUTPATIENT)
Dept: FAMILY MEDICINE | Facility: OTHER | Age: 75
End: 2025-01-07
Attending: FAMILY MEDICINE
Payer: MEDICARE

## 2025-01-07 ENCOUNTER — LAB (OUTPATIENT)
Dept: LAB | Facility: OTHER | Age: 75
End: 2025-01-07
Attending: FAMILY MEDICINE
Payer: MEDICARE

## 2025-01-07 VITALS
WEIGHT: 174.6 LBS | SYSTOLIC BLOOD PRESSURE: 132 MMHG | OXYGEN SATURATION: 98 % | HEART RATE: 64 BPM | BODY MASS INDEX: 30.93 KG/M2 | DIASTOLIC BLOOD PRESSURE: 74 MMHG | TEMPERATURE: 97.7 F | RESPIRATION RATE: 16 BRPM

## 2025-01-07 DIAGNOSIS — E11.00 TYPE 2 DIABETES MELLITUS WITH HYPEROSMOLARITY WITHOUT COMA, WITHOUT LONG-TERM CURRENT USE OF INSULIN (H): ICD-10-CM

## 2025-01-07 DIAGNOSIS — I10 HYPERTENSION GOAL BP (BLOOD PRESSURE) < 140/80: ICD-10-CM

## 2025-01-07 DIAGNOSIS — C90.00 MULTIPLE MYELOMA NOT HAVING ACHIEVED REMISSION (H): ICD-10-CM

## 2025-01-07 DIAGNOSIS — F33.0 MAJOR DEPRESSIVE DISORDER, RECURRENT EPISODE, MILD: ICD-10-CM

## 2025-01-07 DIAGNOSIS — Z76.89 ENCOUNTER TO ESTABLISH CARE: Primary | ICD-10-CM

## 2025-01-07 DIAGNOSIS — D80.1 HYPOGAMMAGLOBULINEMIA: ICD-10-CM

## 2025-01-07 DIAGNOSIS — E78.2 MIXED HYPERLIPIDEMIA: ICD-10-CM

## 2025-01-07 LAB
CHOLEST SERPL-MCNC: 185 MG/DL
CREAT UR-MCNC: 75.8 MG/DL
EST. AVERAGE GLUCOSE BLD GHB EST-MCNC: 140 MG/DL
FASTING STATUS PATIENT QL REPORTED: YES
HBA1C MFR BLD: 6.5 %
HDLC SERPL-MCNC: 50 MG/DL
LDLC SERPL CALC-MCNC: 110 MG/DL
MICROALBUMIN UR-MCNC: <12 MG/L
MICROALBUMIN/CREAT UR: NORMAL MG/G{CREAT}
NONHDLC SERPL-MCNC: 135 MG/DL
TOTAL PROTEIN SERUM FOR ELP: 6.5 G/DL (ref 6.4–8.3)
TOTAL PROTEIN SERUM FOR ELP: 6.5 G/DL (ref 6.4–8.3)
TRIGL SERPL-MCNC: 125 MG/DL

## 2025-01-07 PROCEDURE — 84165 PROTEIN E-PHORESIS SERUM: CPT | Mod: ZL | Performed by: STUDENT IN AN ORGANIZED HEALTH CARE EDUCATION/TRAINING PROGRAM

## 2025-01-07 PROCEDURE — 85810 BLOOD VISCOSITY EXAMINATION: CPT | Mod: ZL

## 2025-01-07 PROCEDURE — 36415 COLL VENOUS BLD VENIPUNCTURE: CPT | Mod: ZL

## 2025-01-07 PROCEDURE — 83521 IG LIGHT CHAINS FREE EACH: CPT | Mod: ZL

## 2025-01-07 PROCEDURE — 82784 ASSAY IGA/IGD/IGG/IGM EACH: CPT | Mod: ZL

## 2025-01-07 PROCEDURE — 82465 ASSAY BLD/SERUM CHOLESTEROL: CPT | Mod: ZL | Performed by: FAMILY MEDICINE

## 2025-01-07 PROCEDURE — 82043 UR ALBUMIN QUANTITATIVE: CPT | Mod: ZL | Performed by: FAMILY MEDICINE

## 2025-01-07 PROCEDURE — 86334 IMMUNOFIX E-PHORESIS SERUM: CPT | Mod: ZL | Performed by: STUDENT IN AN ORGANIZED HEALTH CARE EDUCATION/TRAINING PROGRAM

## 2025-01-07 PROCEDURE — 83036 HEMOGLOBIN GLYCOSYLATED A1C: CPT | Mod: ZL | Performed by: FAMILY MEDICINE

## 2025-01-07 PROCEDURE — G0463 HOSPITAL OUTPT CLINIC VISIT: HCPCS | Mod: 25

## 2025-01-07 PROCEDURE — 86334 IMMUNOFIX E-PHORESIS SERUM: CPT | Mod: 26 | Performed by: PATHOLOGY

## 2025-01-07 PROCEDURE — 82232 ASSAY OF BETA-2 PROTEIN: CPT | Mod: ZL

## 2025-01-07 PROCEDURE — 84165 PROTEIN E-PHORESIS SERUM: CPT | Mod: 26 | Performed by: PATHOLOGY

## 2025-01-07 PROCEDURE — 84155 ASSAY OF PROTEIN SERUM: CPT | Mod: ZL

## 2025-01-07 ASSESSMENT — ANXIETY QUESTIONNAIRES
3. WORRYING TOO MUCH ABOUT DIFFERENT THINGS: NOT AT ALL
7. FEELING AFRAID AS IF SOMETHING AWFUL MIGHT HAPPEN: NOT AT ALL
7. FEELING AFRAID AS IF SOMETHING AWFUL MIGHT HAPPEN: NOT AT ALL
GAD7 TOTAL SCORE: 1
6. BECOMING EASILY ANNOYED OR IRRITABLE: SEVERAL DAYS
2. NOT BEING ABLE TO STOP OR CONTROL WORRYING: NOT AT ALL
IF YOU CHECKED OFF ANY PROBLEMS ON THIS QUESTIONNAIRE, HOW DIFFICULT HAVE THESE PROBLEMS MADE IT FOR YOU TO DO YOUR WORK, TAKE CARE OF THINGS AT HOME, OR GET ALONG WITH OTHER PEOPLE: NOT DIFFICULT AT ALL
8. IF YOU CHECKED OFF ANY PROBLEMS, HOW DIFFICULT HAVE THESE MADE IT FOR YOU TO DO YOUR WORK, TAKE CARE OF THINGS AT HOME, OR GET ALONG WITH OTHER PEOPLE?: NOT DIFFICULT AT ALL
4. TROUBLE RELAXING: NOT AT ALL
GAD7 TOTAL SCORE: 1
GAD7 TOTAL SCORE: 1
1. FEELING NERVOUS, ANXIOUS, OR ON EDGE: NOT AT ALL
5. BEING SO RESTLESS THAT IT IS HARD TO SIT STILL: NOT AT ALL

## 2025-01-07 ASSESSMENT — PATIENT HEALTH QUESTIONNAIRE - PHQ9
SUM OF ALL RESPONSES TO PHQ QUESTIONS 1-9: 7
SUM OF ALL RESPONSES TO PHQ QUESTIONS 1-9: 7
10. IF YOU CHECKED OFF ANY PROBLEMS, HOW DIFFICULT HAVE THESE PROBLEMS MADE IT FOR YOU TO DO YOUR WORK, TAKE CARE OF THINGS AT HOME, OR GET ALONG WITH OTHER PEOPLE: SOMEWHAT DIFFICULT

## 2025-01-07 ASSESSMENT — PAIN SCALES - GENERAL: PAINLEVEL_OUTOF10: NO PAIN (0)

## 2025-01-08 ENCOUNTER — PATIENT OUTREACH (OUTPATIENT)
Dept: ONCOLOGY | Facility: OTHER | Age: 75
End: 2025-01-08

## 2025-01-08 ENCOUNTER — DOCUMENTATION ONLY (OUTPATIENT)
Dept: PHARMACY | Facility: CLINIC | Age: 75
End: 2025-01-08
Payer: COMMERCIAL

## 2025-01-08 LAB
B2 MICROGLOB TUMOR MARKER SER-MCNC: 2.2 MG/L
IGA SERPL-MCNC: 49 MG/DL (ref 84–499)
IGG SERPL-MCNC: 605 MG/DL (ref 610–1616)
IGM SERPL-MCNC: 35 MG/DL (ref 35–242)
KAPPA LC FREE SER-MCNC: 0.7 MG/DL (ref 0.33–1.94)
KAPPA LC FREE/LAMBDA FREE SER NEPH: 1.06 {RATIO} (ref 0.26–1.65)
LAMBDA LC FREE SERPL-MCNC: 0.66 MG/DL (ref 0.57–2.63)
VISC SER: 1.5 CP (ref 1.4–1.8)

## 2025-01-08 NOTE — PROGRESS NOTES
[facility]: Cancer Care                                                                                          Patient updated on need to cancel IVIG, schedule updated.     Signature:  Christine Christian RN

## 2025-01-08 NOTE — PROGRESS NOTES
Pharmacist IVIG Stewardship Program    Diagnosis: Hypogammaglobulinemia   Date  12/2/2024   IgG Level (mg/dL) 375     Current Dosing Regimen: Privigen 20g IV every 4 weeks PRN for IgG <600 mg/dL   Patient is dosed as needed based on an IgG level of less than 600 mg/dL to ensure the lowest amount of medication is administered to achieve beneficial outcomes.  Pre-medications:   Acetaminophen 650 mg by mouth, 30 minutes prior to infusion  Diphenhydramine 50 mg by mouth, 30 minutes prior to infusion  Hydrocortisone 100 mg IV PRN for previous reactions to IVIG (per chart review patient has not had IVIG before)  Current Titration Regimen: Start infusion at 0.5 ml/kg/hr x 15 min. If tolerated, increase rate by 0.5 ml/kg/hr every 15 min to a maximum of 4 ml/kg/hr.  Previously Tried Products: None     Side Effects: Unable to reach patient to discuss.     Interventions: Lab review completed.     Assessment:   Date  1/7/2024   IgG Level (mg/dL) 605   Patient is appropriate for IVIG therapy when their level is within parameter. IVIG infusions are effective in increasing IgG levels to an appropriate level to minimize patient's risk of infection. Patient should continue on treatment as they have been per provider order. Without therapy their levels would put them at an increased risk of infections.    Plan: Patient is not okay to proceed with IVIG infusion on 1/14/25 as her level is above 600 mg/dL. Patient will have additional labs drawn next month to assess the need for ongoing infusions. Care team has been notified that the patients IVIG appt for 1/14/25 will need to be cancelled.     Next Review Needed: 2/2025    Karen Menon, PharmD, IgCP  Medication Access Pharmacist

## 2025-01-09 LAB
ALBUMIN SERPL ELPH-MCNC: 4.2 G/DL (ref 3.7–5.1)
ALPHA1 GLOB SERPL ELPH-MCNC: 0.3 G/DL (ref 0.2–0.4)
ALPHA2 GLOB SERPL ELPH-MCNC: 0.7 G/DL (ref 0.5–0.9)
B-GLOBULIN SERPL ELPH-MCNC: 0.7 G/DL (ref 0.6–1)
GAMMA GLOB SERPL ELPH-MCNC: 0.6 G/DL (ref 0.7–1.6)
LOCATION OF TASK: ABNORMAL
LOCATION OF TASK: NORMAL
M PROTEIN SERPL ELPH-MCNC: 0.1 G/DL
PROT PATTERN SERPL ELPH-IMP: ABNORMAL
PROT PATTERN SERPL IFE-IMP: NORMAL

## 2025-01-13 DIAGNOSIS — C90.00 MULTIPLE MYELOMA NOT HAVING ACHIEVED REMISSION (H): ICD-10-CM

## 2025-01-13 DIAGNOSIS — F33.0 MAJOR DEPRESSIVE DISORDER, RECURRENT EPISODE, MILD: ICD-10-CM

## 2025-01-13 NOTE — PROGRESS NOTES
"Oncology Follow-up Visit    Reason for Visit:  Carmelita is a 74 year old woman with a diagnosis of multiple myeloma, who I am visiting with today for routine follow-up and consideration of ongoing therapy.     Nursing Note and documentation reviewed: Yes    Interval History:   Doing well. No new concerns. No new infections. Tolerated first IVIG levels good and levels raised above 600. No fever or chills. No bleeding concerns. No nausea or vomiting and endorsing good appetite. No bowel concerns. Occasional red \"bump\" on face but this resolves on its own. Energy good. Was shoveling light snow yesterday. Trying to stay active but takes breaks as needed.     Oncologic History  12/31/2018 Presented to the emergency room with vertigo and fatigue.  CT scan of the head was negative and subsequent stress test was negative.  05/03/2019 PCP ordered lab work and noted a total protein of 12.9.  SPEP at that time showed an M spike of 6.2 with a large monoclonal protein seen in the gamma fraction. Urine immunofixation showed a possible small protein band in the gamma fraction  05/31/2019 Evaluated by Dr. Walker with Medical Oncology with plan to rule out myeloma and obtain bone marrow aspiration biopsy as well as a metastatic bone survey along with additional labwork  06/18/2019 Underwent bone marrow aspiration and biopsy  06/24/2019 Seen again by Dr. Walker and CBC showed a hemoglobin of 9.3, M spike 7.3 with monoclonal IgG immunoglobulin of kappa light chain type; serum viscosity was 2.9; quantitative immunoglobulins showed an IgG of 8160, beta-2 microglobulin was 5.8, BUN was 21 with creatinine is 0.8 and total protein was 13.7.  Quantitative kappa/lambda free light chains showed an elevated ratio of 17.0 bone marrow aspiration biopsy showed plasma cell myeloma with approximately 80% plasma cells.  Immunofixation showed IgG kappa and flow cytometry revealed kappa monotypic plasma cells consistent with clonal plasma cell " neoplasm and FISH panel was pending at that time. It was felt she had at least stage II disease based on her beta-2 microglobulin and anemia. Plan was to treat with Velcade 1.3 mg per/m2 days 1, 4, 8 and 11/Decadron 40 mg on days 1, 8 and 15. Initiation of Revlimid with C2 at 25 mg daily days 1 through 14.  Plan was to also obtain an MRI of the lumbar spine to rule out lytic lesion at L3.  She was initiated on Zovirax 400 mg p.o. twice daily.  06/25/2019 C1 of chemotherapy initiated  07/01/2019 Note in chart regarding patient's large co-pay for the Revlimid and no plan at this point to initiate Revlimid and treat only with Velcade and Decadron per Dr. Walker  07/11/2019 MRI lumbar spine shows a pathologic superior endplate compression fracture at L3 without evidence of retropulsed fragment and innumerable enhancing lesions throughout the lumbar spine consistent with history of multiple myeloma.  09/10/2019 Increasing M-spike and kappa lambda ratio  10/01/2019 Initiation of CyBorD  11/17/2020 Plateau of M-spike at 1.2 after 36 cycles of CyBorD  12/01/2020 Initiation of Darzalex Faspro injection  03/23/2021 M-spike increased form 1.0 to 1.2 and velcade and dexamethasone added to plan  04/12/2022 Treatment HOLIDAY commenced with Mspike 0.3 after 22 cycles of treatent  05/27/2022 Received Evusheld while on treatment holiday.   07/18/2023 Mspike 0.8. Other labs stable. PET scan ordered.   08/02/2023 PET completed showing negative study. No evidence of mass or lymphadenopathy. No concerning hypermetabolism is present.  08/14/2023 Met with Dr. Walker. Given biochemical progression, he did feel appropriate to resume therapy again with Darzalex faspro  03/12/2023 Following C7, mspike magy from 0.2-->0.8. PET ordered which was negative. Again increased frequency of Darzalex with anticipation of adding in Pomalyst with C2.    04/29/2024 Addition of Pomalyst with C2  12/16/2024 IVIG added for suppressed IgG levels and  "increased infections    Current Chemo Regime/TX: Darzalex faspro injection 1800mg subcutaneous per protocol and Pomalyst 1 mg days 1-21 of 28 day cycle given with weekly Dex  Current Cycle: C11  Completed cycles: 10  **Pom added C2 (4/29)     Previous treatment:     Velcade 1.3 mg/m2 days 1, 4, 8 and 11 with Decadron 40 mg days 1, 8 and 15 x 4 cycles;     Velcade 1.5mg.m2, cyclophosphamide 150mg every 7 days on days 1,8,15 and 22 with decadron 40mg days 1,8,15,22  Darzalex faspro injection 1800mg subcutaneous per protocol    Past Medical History:   Diagnosis Date    Arthritis     Cyst of breast     Depressive disorder     Diabetes mellitus, type 2 (H) 01/18/2021    Essential hypertension 10/01/2015    Major depressive disorder, recurrent episode, mild 10/01/2015    Mixed hyperlipidemia 7/5/2013    Multiple myeloma not having achieved remission (H) 06/24/2019    Other specified disorders of bladder 07/09/2012    Irritable Bladder    Seasonal allergies 10/01/2015    Unspecified essential hypertension 03/19/2007    Unspecified sinusitis (chronic) 09/05/2007       Past Surgical History:   Procedure Laterality Date    APPENDECTOMY      BONE MARROW BIOPSY, BONE SPECIMEN, NEEDLE/TROCAR N/A 06/18/2019    Procedure: BONE MARROW BIOPSY;  Surgeon: Maciej Sanz MD;  Location: [facility]    CHOLECYSTECTOMY      COLONOSCOPY  07/18/2006    repeat 10 years    COLONOSCOPY N/A 12/30/2016    Procedure: COLONOSCOPY;  Surgeon: Bhaskar Franklin DO;  Location: [facility] OR    PUNC/ASPIR BREAST CYST Left 1995    benign    SINUS SURGERY      TUBAL STERILIZATION         Family History   Problem Relation Age of Onset    Breast Cancer Mother 60        Cause of Death; 1 breast    Parkinsonism Father         \"Possible\"    Coronary Artery Disease Father     Pacemaker Father     Thyroid Disease Daughter     Breast Cancer Maternal Aunt         over 50    Diabetes No family hx of     Hypertension No family hx of     Hyperlipidemia No family hx of     " Cerebrovascular Disease No family hx of     Colon Cancer No family hx of     Prostate Cancer No family hx of     Genetic Disorder No family hx of     Asthma No family hx of     Anesthesia Reaction No family hx of        Social History     Socioeconomic History    Marital status:      Spouse name: Not on file    Number of children: Not on file    Years of education: Not on file    Highest education level: Not on file   Occupational History    Occupation: Financial     Comment:  - (FT)   Tobacco Use    Smoking status: Never    Smokeless tobacco: Never    Tobacco comments:     Tried to Quit (YES); QUIT in 1971; Passive Exposure (NO)   Vaping Use    Vaping status: Never Used   Substance and Sexual Activity    Alcohol use: Yes     Comment: RARELY    Drug use: No    Sexual activity: Yes     Partners: Male     Birth control/protection: None   Other Topics Concern     Service Not Asked    Blood Transfusions Not Asked    Caffeine Concern Yes     Comment: Coffee >6 cups daily    Occupational Exposure Not Asked    Hobby Hazards Not Asked    Sleep Concern Not Asked    Stress Concern Not Asked    Weight Concern Not Asked    Special Diet Not Asked    Back Care Not Asked    Exercise Not Asked    Bike Helmet Not Asked    Seat Belt Not Asked    Self-Exams Not Asked    Parent/sibling w/ CABG, MI or angioplasty before 65F 55M? No   Social History Narrative    Not on file     Social Drivers of Health     Financial Resource Strain: Low Risk  (1/7/2025)    Financial Resource Strain     Within the past 12 months, have you or your family members you live with been unable to get utilities (heat, electricity) when it was really needed?: No   Food Insecurity: Low Risk  (1/7/2025)    Food Insecurity     Within the past 12 months, did you worry that your food would run out before you got money to buy more?: No     Within the past 12 months, did the food you bought just not last and you didn t have money to get  more?: No   Transportation Needs: Low Risk  (1/7/2025)    Transportation Needs     Within the past 12 months, has lack of transportation kept you from medical appointments, getting your medicines, non-medical meetings or appointments, work, or from getting things that you need?: No   Physical Activity: Not on file   Stress: Not on file   Social Connections: Not on file   Interpersonal Safety: Low Risk  (1/14/2025)    Interpersonal Safety     Do you feel physically and emotionally safe where you currently live?: Yes     Within the past 12 months, have you been hit, slapped, kicked or otherwise physically hurt by someone?: No     Within the past 12 months, have you been humiliated or emotionally abused in other ways by your partner or ex-partner?: No   Housing Stability: Low Risk  (1/7/2025)    Housing Stability     Do you have housing? : Yes     Are you worried about losing your housing?: No       Current Outpatient Medications   Medication Sig Dispense Refill    acyclovir (ZOVIRAX) 400 MG tablet Take 1 tablet (400 mg) by mouth 2 times daily Start 1 week prior to daratumumab and continue for 3 months after daratumumab treatment complete. 60 tablet 11    aspirin (ASA) 81 MG chewable tablet Take 81 mg by mouth daily      atorvastatin (LIPITOR) 20 MG tablet TAKE 1 TABLET BY MOUTH DAILY 90 tablet 0    cetirizine (ZYRTEC) 10 MG tablet Take 1 tablet (10 mg) by mouth daily 30 tablet 1    dexAMETHasone (DECADRON) 4 MG tablet Take 5 tablets (20 mg) on Days 8, 15, and 22. 15 tablet 0    diphenhydrAMINE (BENADRYL) 25 MG capsule Take 25 mg by mouth daily      fluticasone (FLONASE) 50 MCG/ACT nasal spray SPRAY 2 SPRAYS INTO BOTH NOSTRILS DAILY 48 mL 0    hydrocortisone (CORTAID) 1 % external cream Apply topically 2 times daily sparingly over rash to face and chest 45 g 0    losartan (COZAAR) 50 MG tablet TAKE 1 TABLET BY MOUTH DAILY 90 tablet 0    pomalidomide (POMALYST) 1 MG capsule Take 1 capsule (1 mg) by mouth daily for 21  "days Day 1 thru 21 of each 28 day cycle. Swallow whole with water. 21 capsule 0    sertraline (ZOLOFT) 50 MG tablet TAKE 1/2 TABLET BY MOUTH DAILY 45 tablet 3    L-Lysine HCl 500 MG CAPS Take by mouth daily. (Patient not taking: Reported on 1/14/2025)      prochlorperazine (COMPAZINE) 10 MG tablet Take 1 tablet (10 mg) by mouth every 6 hours as needed for nausea or vomiting (Patient not taking: Reported on 1/14/2025) 30 tablet 2     No current facility-administered medications for this visit.        Allergies   Allergen Reactions    Lisinopril Cough    Phenylephrine Hcl Other (See Comments)     **Entex  HEADACHE (SEVERE)    Phenylpropanolamine Other (See Comments)     **Entex  HEADACHE (SEVERE)    Pseudoephedrine Tannate Other (See Comments)     **Entex  HEADACHE (SEVERE)    Levofloxacin Rash     **Levaquin    Moxifloxacin Hcl [Moxifloxacin Hcl In Nacl] Rash       Review Of Systems:  A complete review of systems is negative except for the above mentioned items in the interval history.     ECOG PS: 0    Physical Exam:  /70 (BP Location: Left arm, Patient Position: Sitting, Cuff Size: Adult Regular)   Pulse 96   Temp 98.4  F (36.9  C) (Tympanic)   Ht 1.6 m (5' 3\")   Wt 80.2 kg (176 lb 12.9 oz)   SpO2 95%   BMI 31.32 kg/m    GENERAL APPEARANCE: Alert and in no acute distress.  HEENT: Eyes appear normal without scleral icterus. Extraocular movements intact.   NECK:   Supple with normal range of motion. No asymmetry or masses.   LYMPHATICS: No palpable cervical, supraclavicular nodes.  RESP: Lungs clear to auscultation bilaterally, respirations regular and easy.  CARDIOVASCULAR: Regular rate and rhythm. Normal S1, S2; no murmur, gallop, or rub.  ABDOMEN: Soft, non-tender, non-distended. No palpable organomegaly or masses.  MUSCULOSKELETAL: Extremities without gross deformities noted. No edema of bilateral lower extremities.  SKIN: No suspicious lesions or rashes.  NEURO: Alert and oriented x 3.  Gait " steady.  PSYCHIATRIC: Mentation and affect appear normal.  Mood appropriate.    Laboratory:  Results for orders placed or performed in visit on 01/14/25   CBC with platelets and differential     Status: Abnormal   Result Value Ref Range    WBC Count 4.5 4.0 - 11.0 10e3/uL    RBC Count 4.54 3.80 - 5.20 10e6/uL    Hemoglobin 13.5 11.7 - 15.7 g/dL    Hematocrit 40.4 35.0 - 47.0 %    MCV 89 78 - 100 fL    MCH 29.7 26.5 - 33.0 pg    MCHC 33.4 31.5 - 36.5 g/dL    RDW 15.6 (H) 10.0 - 15.0 %    Platelet Count 252 150 - 450 10e3/uL    % Neutrophils 35 %    % Lymphocytes 44 %    % Monocytes 15 %    % Eosinophils 4 %    % Basophils 2 %    % Immature Granulocytes 0 %    NRBCs per 100 WBC 0 <1 /100    Absolute Neutrophils 1.6 1.6 - 8.3 10e3/uL    Absolute Lymphocytes 2.0 0.8 - 5.3 10e3/uL    Absolute Monocytes 0.7 0.0 - 1.3 10e3/uL    Absolute Eosinophils 0.2 0.0 - 0.7 10e3/uL    Absolute Basophils 0.1 0.0 - 0.2 10e3/uL    Absolute Immature Granulocytes 0.0 <=0.4 10e3/uL    Absolute NRBCs 0.0 10e3/uL   RBC and Platelet Morphology     Status: Abnormal   Result Value Ref Range    RBC Morphology Confirmed RBC Indices     Platelet Assessment  Automated Count Confirmed. Platelet morphology is normal.     Automated Count Confirmed. Platelet morphology is normal.    Acanthocytes Slight (A) None Seen   CBC with platelets and differential     Status: Abnormal    Narrative    The following orders were created for panel order CBC with platelets and differential.  Procedure                               Abnormality         Status                     ---------                               -----------         ------                     CBC with platelets and d...[[id number]]  Abnormal            Final result               RBC and Platelet Morphology[[id number]]  Abnormal            Final result                 Please view results for these tests on the individual orders.     Component      Latest Ref Rng 1/7/2025  8:21 AM   Albumin Fraction       3.7 - 5.1 g/dL 4.2    Alpha 1 Fraction      0.2 - 0.4 g/dL 0.3    Alpha 2 Fraction      0.5 - 0.9 g/dL 0.7    Beta Fraction      0.6 - 1.0 g/dL 0.7    Gamma Fraction      0.7 - 1.6 g/dL 0.6 (L)    Monoclonal Peak      <=0.0 g/dL 0.1 (H)    ELP Interpretation: Very small monoclonal protein (0.1 g/dL) seen in the gamma fraction. See immunofixation report on same specimen. Slight hypogammaglobulinemia. Pathologic significance requires clinical correlation. Clair Lyons M.D., Ph.D.    Signout Location if Remote ABK1    Signout Location if Remote ABK1    IGG      610 - 1,616 mg/dL 605 (L)    IGA      84 - 499 mg/dL 49 (L)    IGM      35 - 242 mg/dL 35    [place] Free Lt Chain      0.33 - 1.94 mg/dL 0.70    Lambda Free Lt Chain      0.57 - 2.63 mg/dL 0.66    Kappa Lambda Ratio      0.26 - 1.65  1.06    Immunofixation ELP Small monoclonal IgG immunoglobulin of kappa light chain type. Monoclonal antibody therapeutics (e.g. Daratumumab) may appear as monoclonal proteins on serum electrophoresis and immunofixation. Results should be interpreted with caution. Pathologic significance requires clinical correlation.  Clair Lyons M.D., Ph.D.    Beta-2-Microglobulin      <2.3 mg/L 2.2    Viscosity Index      1.4 - 1.8  1.5    Total Protein Serum for ELP      6.4 - 8.3 g/dL 6.5    Total Protein Serum for ELP      6.4 - 8.3 g/dL 6.5       Legend:  (L) Low  (H) High  Imaging Studies:  None this visit.     ASSESSMENT/PLAN:  #1 Multiple myeloma IgG kappa multiple myeloma with 80% involvement of the bone marrow diagnosed June 2019 initially treated with Velcade and Decadron and received 4 cycles with increasing M-spike and kappa/lambda ratio.  Treatment changed to CyBorD on 10/1/2019.  M-spike plateau at 1.2 and treatment changed to monotherapy with Darzalex Faspro injection. M spike declined to 1.0 then increased again to 1.2 so Velcade and Dex added to her treatment plan with cycle 5 therapy. Following C22 (April 2022), she did initiate  a treatment holiday when her Mspike was 0.3.     We had followed her counts for over a year. Unfortunately, more recently, she did see a rise in her mspike to 0.8. PET completed was negative. Dr. Walker felt this was a biochemical progression and after discussion with patient, elected to resume treatment with Darzalex faspro. Mspike began to rise. PET was negative but feeling like she wasn't responding to Darzalex alone, decided to add in Pomalyst 1 mg p.o. daily on days 1 through 21 to begin with cycle 2 in addition to dexamethasone 20 mg p.o. on days 1 ,8,15,22.    Today, doing well. MM numbers are looking good. Mspike remains stable at 0.1. No end organ damage. No change to plan. Will commence Pom today with weekly Dex. Will get Darzalex today, along with Zometa. Plan for labs in 3 weeks and see me back in 4 with infusion afterwards. Sooner with concerns.     #2 Lytic lesions Hx lytic lesion at L3. Zometa Q3M, due today. She remains on calcium with vitamin D twice a day. Weight bearing activity.    #3 Bone pain Occasional pain in ribs and sternum. Intermittent with come and go tendancy. No prior disease in these areas on last PET which was reviewed with patient. Will monitor. No changes in labs at this time.     #4 Hypogammaglobulinemia Given decreased IgG levels and recurrent ear infections, Dr. Walker elected to commence IVIG. This is to be given every 4 weeks for IgG levels less than 600. Her IgG levels today are 605. Will hold IVIG today and recheck levels in one month. Per Dr. Walker if her M-spike rises above 0.8 and Her IgG levels rise beyond 1500, we would obviously have to change treatment and stop any future IVIG.        Patient in agreement with plan and verbalizes understanding. Agrees to call with any questions or concerns.      36 minutes spent in the patient's encounter today with time spent in review of patient's chart along with chart preparation and review of the treatment plan and signing  of treatment plan.  Time was also spent with the patient in obtaining a review of systems and performing a physical exam along with detailed review of all test results. Time was also spent in discussing plan for future follow-up and relating instructions for follow-up and in placing future orders.    The longitudinal plan of care for the diagnosis(es)/condition(s) as documented were addressed during this visit. Due to the added complexity in care, I will continue to support Carmelita in the subsequent management and with ongoing continuity of care.        FLO Duque, FNP-C

## 2025-01-13 NOTE — TELEPHONE ENCOUNTER
Sertraline 50 mg tab      Last Written Prescription Date:  11-8-24  Last Fill Quantity: 30,   # refills: 0  Last Office Visit: 1-7-25  Future Office visit:    Next 5 appointments (look out 90 days)      Jan 14, 2025 9:10 AM  (Arrive by 8:55 AM)  Return Visit with FLO Robertson CNP  [facility] ([facility] ) [address] [place] [address]  [phone]     Feb 10, 2025 9:30 AM  (Arrive by 9:15 AM)  Return Visit with Elías Walker MD  [facility] ([facility] ) [address] [place] [address]  [phone]     Mar 10, 2025 8:20 AM  (Arrive by 8:05 AM)  Return Visit with FLO Robertson [facility] ([facility] ) [address] [place] [address]  [phone]     Apr 09, 2025 10:30 AM  (Arrive by 10:15 AM)  Nurse Only with Claudine Carmona  [facility] ([facility] ) [address]  [facility] [address]  [phone]

## 2025-01-14 ENCOUNTER — LAB (OUTPATIENT)
Dept: LAB | Facility: OTHER | Age: 75
End: 2025-01-14
Attending: NURSE PRACTITIONER
Payer: MEDICARE

## 2025-01-14 ENCOUNTER — INFUSION THERAPY VISIT (OUTPATIENT)
Dept: INFUSION THERAPY | Facility: OTHER | Age: 75
End: 2025-01-14
Attending: INTERNAL MEDICINE
Payer: MEDICARE

## 2025-01-14 ENCOUNTER — ONCOLOGY VISIT (OUTPATIENT)
Dept: ONCOLOGY | Facility: OTHER | Age: 75
End: 2025-01-14
Attending: NURSE PRACTITIONER
Payer: MEDICARE

## 2025-01-14 VITALS
SYSTOLIC BLOOD PRESSURE: 110 MMHG | WEIGHT: 176.81 LBS | HEART RATE: 96 BPM | BODY MASS INDEX: 31.33 KG/M2 | TEMPERATURE: 98.4 F | OXYGEN SATURATION: 95 % | HEIGHT: 63 IN | DIASTOLIC BLOOD PRESSURE: 70 MMHG

## 2025-01-14 VITALS — RESPIRATION RATE: 16 BRPM | SYSTOLIC BLOOD PRESSURE: 126 MMHG | HEART RATE: 63 BPM | DIASTOLIC BLOOD PRESSURE: 75 MMHG

## 2025-01-14 DIAGNOSIS — D80.1 HYPOGAMMAGLOBULINEMIA: ICD-10-CM

## 2025-01-14 DIAGNOSIS — C90.00 MULTIPLE MYELOMA NOT HAVING ACHIEVED REMISSION (H): Primary | ICD-10-CM

## 2025-01-14 DIAGNOSIS — M89.9 BONE LESION: ICD-10-CM

## 2025-01-14 DIAGNOSIS — M89.8X9 BONE PAIN: ICD-10-CM

## 2025-01-14 LAB
ACANTHOCYTES BLD QL SMEAR: SLIGHT
ALBUMIN SERPL BCG-MCNC: 4.1 G/DL (ref 3.5–5.2)
ALP SERPL-CCNC: 122 U/L (ref 40–150)
ALT SERPL W P-5'-P-CCNC: 17 U/L (ref 0–50)
ANION GAP SERPL CALCULATED.3IONS-SCNC: 12 MMOL/L (ref 7–15)
AST SERPL W P-5'-P-CCNC: 25 U/L (ref 0–45)
BASOPHILS # BLD AUTO: 0.1 10E3/UL (ref 0–0.2)
BASOPHILS NFR BLD AUTO: 2 %
BILIRUB SERPL-MCNC: 0.3 MG/DL
BUN SERPL-MCNC: 12.5 MG/DL (ref 8–23)
CALCIUM SERPL-MCNC: 9.8 MG/DL (ref 8.8–10.4)
CHLORIDE SERPL-SCNC: 103 MMOL/L (ref 98–107)
CREAT SERPL-MCNC: 0.73 MG/DL (ref 0.51–0.95)
EGFRCR SERPLBLD CKD-EPI 2021: 85 ML/MIN/1.73M2
EOSINOPHIL # BLD AUTO: 0.2 10E3/UL (ref 0–0.7)
EOSINOPHIL NFR BLD AUTO: 4 %
ERYTHROCYTE [DISTWIDTH] IN BLOOD BY AUTOMATED COUNT: 15.6 % (ref 10–15)
GLUCOSE SERPL-MCNC: 124 MG/DL (ref 70–99)
HCO3 SERPL-SCNC: 24 MMOL/L (ref 22–29)
HCT VFR BLD AUTO: 40.4 % (ref 35–47)
HGB BLD-MCNC: 13.5 G/DL (ref 11.7–15.7)
IMM GRANULOCYTES # BLD: 0 10E3/UL
IMM GRANULOCYTES NFR BLD: 0 %
LYMPHOCYTES # BLD AUTO: 2 10E3/UL (ref 0.8–5.3)
LYMPHOCYTES NFR BLD AUTO: 44 %
MCH RBC QN AUTO: 29.7 PG (ref 26.5–33)
MCHC RBC AUTO-ENTMCNC: 33.4 G/DL (ref 31.5–36.5)
MCV RBC AUTO: 89 FL (ref 78–100)
MONOCYTES # BLD AUTO: 0.7 10E3/UL (ref 0–1.3)
MONOCYTES NFR BLD AUTO: 15 %
NEUTROPHILS # BLD AUTO: 1.6 10E3/UL (ref 1.6–8.3)
NEUTROPHILS NFR BLD AUTO: 35 %
NRBC # BLD AUTO: 0 10E3/UL
NRBC BLD AUTO-RTO: 0 /100
PLAT MORPH BLD: ABNORMAL
PLATELET # BLD AUTO: 252 10E3/UL (ref 150–450)
POTASSIUM SERPL-SCNC: 4.1 MMOL/L (ref 3.4–5.3)
PROT SERPL-MCNC: 6.6 G/DL (ref 6.4–8.3)
RBC # BLD AUTO: 4.54 10E6/UL (ref 3.8–5.2)
RBC MORPH BLD: ABNORMAL
SODIUM SERPL-SCNC: 139 MMOL/L (ref 135–145)
WBC # BLD AUTO: 4.5 10E3/UL (ref 4–11)

## 2025-01-14 PROCEDURE — 36415 COLL VENOUS BLD VENIPUNCTURE: CPT | Mod: ZL

## 2025-01-14 PROCEDURE — 250N000011 HC RX IP 250 OP 636: Mod: JZ | Performed by: NURSE PRACTITIONER

## 2025-01-14 PROCEDURE — 82784 ASSAY IGA/IGD/IGG/IGM EACH: CPT | Mod: ZL

## 2025-01-14 PROCEDURE — 36415 COLL VENOUS BLD VENIPUNCTURE: CPT

## 2025-01-14 PROCEDURE — 85004 AUTOMATED DIFF WBC COUNT: CPT | Mod: ZL | Performed by: NURSE PRACTITIONER

## 2025-01-14 PROCEDURE — 36415 COLL VENOUS BLD VENIPUNCTURE: CPT | Performed by: NURSE PRACTITIONER

## 2025-01-14 PROCEDURE — 80053 COMPREHEN METABOLIC PANEL: CPT | Mod: ZL

## 2025-01-14 PROCEDURE — G0463 HOSPITAL OUTPT CLINIC VISIT: HCPCS

## 2025-01-14 PROCEDURE — 258N000003 HC RX IP 258 OP 636: Performed by: NURSE PRACTITIONER

## 2025-01-14 PROCEDURE — 85018 HEMOGLOBIN: CPT | Mod: ZL | Performed by: NURSE PRACTITIONER

## 2025-01-14 RX ORDER — ACETAMINOPHEN 325 MG/1
650 TABLET ORAL ONCE
Status: COMPLETED | OUTPATIENT
Start: 2025-01-14 | End: 2025-01-14

## 2025-01-14 RX ORDER — DEXAMETHASONE SODIUM PHOSPHATE 10 MG/ML
20 INJECTION, SOLUTION INTRAMUSCULAR; INTRAVENOUS ONCE
Status: CANCELLED
Start: 2025-01-14 | End: 2025-01-14

## 2025-01-14 RX ORDER — DIPHENHYDRAMINE HCL 50 MG
50 CAPSULE ORAL
Status: CANCELLED | OUTPATIENT
Start: 2025-01-14

## 2025-01-14 RX ORDER — MEPERIDINE HYDROCHLORIDE 25 MG/ML
25 INJECTION INTRAMUSCULAR; INTRAVENOUS; SUBCUTANEOUS EVERY 30 MIN PRN
Status: CANCELLED | OUTPATIENT
Start: 2025-01-14

## 2025-01-14 RX ORDER — MONTELUKAST SODIUM 10 MG/1
10 TABLET ORAL ONCE
Status: COMPLETED | OUTPATIENT
Start: 2025-01-14 | End: 2025-01-14

## 2025-01-14 RX ORDER — ACETAMINOPHEN 325 MG/1
650 TABLET ORAL ONCE
Status: CANCELLED | OUTPATIENT
Start: 2025-01-14

## 2025-01-14 RX ORDER — DIPHENHYDRAMINE HCL 50 MG
50 CAPSULE ORAL ONCE
Status: COMPLETED | OUTPATIENT
Start: 2025-01-14 | End: 2025-01-14

## 2025-01-14 RX ORDER — ZOLEDRONIC ACID 0.04 MG/ML
4 INJECTION, SOLUTION INTRAVENOUS ONCE
OUTPATIENT
Start: 2025-01-14 | End: 2025-01-14

## 2025-01-14 RX ORDER — DIPHENHYDRAMINE HYDROCHLORIDE 50 MG/ML
50 INJECTION INTRAMUSCULAR; INTRAVENOUS
Status: CANCELLED
Start: 2025-01-14

## 2025-01-14 RX ORDER — METHYLPREDNISOLONE SODIUM SUCCINATE 125 MG/2ML
125 INJECTION INTRAMUSCULAR; INTRAVENOUS
Status: CANCELLED
Start: 2025-01-14

## 2025-01-14 RX ORDER — LORAZEPAM 2 MG/ML
0.5 INJECTION INTRAMUSCULAR EVERY 4 HOURS PRN
Status: CANCELLED | OUTPATIENT
Start: 2025-01-14

## 2025-01-14 RX ORDER — ALBUTEROL SULFATE 90 UG/1
1-2 INHALANT RESPIRATORY (INHALATION)
Status: CANCELLED
Start: 2025-01-14

## 2025-01-14 RX ORDER — MONTELUKAST SODIUM 10 MG/1
10 TABLET ORAL ONCE
Status: CANCELLED | OUTPATIENT
Start: 2025-01-14

## 2025-01-14 RX ORDER — DEXAMETHASONE 4 MG/1
20 TABLET ORAL ONCE
Status: CANCELLED | OUTPATIENT
Start: 2025-01-14

## 2025-01-14 RX ORDER — DEXAMETHASONE 4 MG/1
20 TABLET ORAL ONCE
Status: COMPLETED | OUTPATIENT
Start: 2025-01-14 | End: 2025-01-14

## 2025-01-14 RX ORDER — ALBUTEROL SULFATE 0.83 MG/ML
2.5 SOLUTION RESPIRATORY (INHALATION)
Status: CANCELLED | OUTPATIENT
Start: 2025-01-14

## 2025-01-14 RX ORDER — ACETAMINOPHEN 325 MG/1
650 TABLET ORAL
Status: CANCELLED | OUTPATIENT
Start: 2025-01-14

## 2025-01-14 RX ORDER — ZOLEDRONIC ACID 0.04 MG/ML
4 INJECTION, SOLUTION INTRAVENOUS ONCE
Status: COMPLETED | OUTPATIENT
Start: 2025-01-14 | End: 2025-01-14

## 2025-01-14 RX ORDER — DIPHENHYDRAMINE HCL 50 MG
50 CAPSULE ORAL ONCE
Status: CANCELLED | OUTPATIENT
Start: 2025-01-14

## 2025-01-14 RX ORDER — EPINEPHRINE 1 MG/ML
0.3 INJECTION, SOLUTION, CONCENTRATE INTRAVENOUS EVERY 5 MIN PRN
Status: CANCELLED | OUTPATIENT
Start: 2025-01-14

## 2025-01-14 RX ADMIN — ZOLEDRONIC ACID 4 MG: 0.04 INJECTION, SOLUTION INTRAVENOUS at 11:46

## 2025-01-14 RX ADMIN — DEXAMETHASONE 20 MG: 4 TABLET ORAL at 10:42

## 2025-01-14 RX ADMIN — MONTELUKAST SODIUM 10 MG: 10 TABLET ORAL at 10:42

## 2025-01-14 RX ADMIN — DARATUMUMAB AND HYALURONIDASE-FIHJ (HUMAN RECOMBINANT) 1800 MG: 1800; 30000 INJECTION SUBCUTANEOUS at 11:10

## 2025-01-14 RX ADMIN — ACETAMINOPHEN 650 MG: 325 TABLET ORAL at 10:42

## 2025-01-14 RX ADMIN — SODIUM CHLORIDE 500 ML: 9 INJECTION, SOLUTION INTRAVENOUS at 11:46

## 2025-01-14 RX ADMIN — Medication 50 MG: at 10:42

## 2025-01-14 ASSESSMENT — PAIN SCALES - GENERAL: PAINLEVEL_OUTOF10: NO PAIN (0)

## 2025-01-14 NOTE — PROGRESS NOTES
Infusion Nursing Note:  Carmelita Watts presents today for FASPRO / Zometa.    Patient seen by provider today: Yes: Nida Gonzalez NP ONC  Home medications, allergies, vitals, fall risk, pain and abuse screen done at [facility] ONC appt earlier this date. All reviewed by this nurse prior to treating. Patient denies questions or concerns not already discussed with provider prior, wishes to proceed with treatment.    present during visit today: Not Applicable.    Note: Call to Nida Gonzalez NP ONC confirming Zometa is due and being given today, as labs not running for this infusion. She advised a call to lab, as patient started in lab today, and noted that further labs were drawn by ONC staff as well. Did so. Lab did not receive green tube, will need drawn. With plan to provide Zometa, IV placed and lab drawn from it. Explained, and apologized, to patient, patient understanding/agreeable.     1120: Call to lab, as CMP result not yet available. Per Rolan, analyzer down for a time, labs now running and they hope for results within 10 minutes. Patient updated, apologized again for delays today. Patient understanding/accepting.     Intravenous Access:  Labs drawn without difficulty.  Peripheral IV placed.    Treatment Conditions:  Lab Results   Component Value Date    HGB 13.5 01/14/2025    WBC 4.5 01/14/2025    ANEU 8.8 (H) 08/06/2024    ANEUTAUTO 1.6 01/14/2025     01/14/2025        Lab Results   Component Value Date     01/14/2025    POTASSIUM 4.1 01/14/2025    CR 0.73 01/14/2025    PAYAL 9.8 01/14/2025    BILITOTAL 0.3 01/14/2025    ALBUMIN 4.1 01/14/2025    ALT 17 01/14/2025    AST 25 01/14/2025       Results reviewed, labs MET treatment parameters, ok to proceed with treatment.      Post Infusion Assessment:  Patient tolerated injection without incident.  The following medication was given:     MEDICATION: FASPRO  ROUTE: SQ  SITE: LUQ - Abd    Patient tolerated infusion without  incident.  Site patent and intact, free from redness, edema or discomfort.  No evidence of extravasations.  Access discontinued per protocol.       Discharge Plan:   Patient discharged in stable condition accompanied by: self.  Departure Mode: Ambulatory.  Printed/given ONC AVS.

## 2025-01-14 NOTE — NURSING NOTE
"Oncology Rooming Note    January 14, 2025 9:33 AM   Carmelita Watts is a 74 year old female who presents for:    Chief Complaint   Patient presents with    Oncology Clinic Visit     Follow up. Multiple myeloma not having achieved remission      Initial Vitals: /70 (BP Location: Left arm, Patient Position: Sitting, Cuff Size: Adult Regular)   Pulse 96   Temp 98.4  F (36.9  C) (Tympanic)   Ht 1.6 m (5' 3\")   Wt 80.2 kg (176 lb 12.9 oz)   SpO2 95%   BMI 31.32 kg/m   Estimated body mass index is 31.32 kg/m  as calculated from the following:    Height as of this encounter: 1.6 m (5' 3\").    Weight as of this encounter: 80.2 kg (176 lb 12.9 oz). Body surface area is 1.89 meters squared.  No Pain (0) Comment: Data Unavailable   No LMP recorded. Patient is postmenopausal.  Allergies reviewed: Yes  Medications reviewed: Yes    Medications: Medication refills not needed today.  Pharmacy name entered into [facility]:    [id number] - [place], [place] - [address] AT [facility] [address]  [facility] - [place], [place] - [address]    Frailty Screening:   Is the patient here for a new oncology consult visit in cancer care? 2. No      Clinical concerns: none       Julia See LPN              "

## 2025-01-14 NOTE — PATIENT INSTRUCTIONS
We will see you back as planned. If you have any questions or concerns, we can be reached Monday through Friday 8am - 430pm at [phone] ([phone]). If you have concerns related to a potential reaction/side effect after hours/weekends/holiday's, please seek emergent medical care.

## 2025-01-15 LAB — IGG SERPL-MCNC: 585 MG/DL (ref 610–1616)

## 2025-01-28 NOTE — PROGRESS NOTES
Patient is a 70 year old  her today for injection of Velcade per order of . Patient identified with two identifiers, order verified, and verbal consent for today's infusion obtained from patient. Written consent for treatment is on file and valid.    Recent lab values: WBC: 4.6, HGB: 12.2, PLT: 223  ANC: 2.4.   Patient meets order parameters for today's treatment.    Velcade dose verified with  prior to release of drug.      Patient denies questions or concerns regarding injection and/or medication(s) being administered.    Velcade injected SQ into left back arm at 45 degree angle per protocol rotating sites. Patient tolerated injection without incident, no signs or symptoms of adverse reaction noted. Patient denies pain or discomfort.     Covered with a sterile bandage. Pt instructed to leave bandage intact for a minimum of one hour, and to call with questions or concerns. Copy of appointments, discharge instructions, and after visit summary (AVS) provided to patient. Patient states understanding, discharged.    18

## 2025-02-03 ENCOUNTER — LAB (OUTPATIENT)
Dept: LAB | Facility: OTHER | Age: 75
End: 2025-02-03
Payer: MEDICARE

## 2025-02-03 ENCOUNTER — TELEPHONE (OUTPATIENT)
Dept: ONCOLOGY | Facility: CLINIC | Age: 75
End: 2025-02-03
Payer: COMMERCIAL

## 2025-02-03 DIAGNOSIS — C90.00 MULTIPLE MYELOMA NOT HAVING ACHIEVED REMISSION (H): ICD-10-CM

## 2025-02-03 DIAGNOSIS — D80.1 HYPOGAMMAGLOBULINEMIA: ICD-10-CM

## 2025-02-03 LAB — TOTAL PROTEIN SERUM FOR ELP: 6.5 G/DL (ref 6.4–8.3)

## 2025-02-03 PROCEDURE — 84165 PROTEIN E-PHORESIS SERUM: CPT | Mod: 26 | Performed by: PATHOLOGY

## 2025-02-03 PROCEDURE — 82784 ASSAY IGA/IGD/IGG/IGM EACH: CPT | Mod: ZL

## 2025-02-03 PROCEDURE — 85810 BLOOD VISCOSITY EXAMINATION: CPT | Mod: ZL

## 2025-02-03 PROCEDURE — 86334 IMMUNOFIX E-PHORESIS SERUM: CPT | Mod: 26 | Performed by: PATHOLOGY

## 2025-02-03 PROCEDURE — 86334 IMMUNOFIX E-PHORESIS SERUM: CPT | Mod: ZL | Performed by: PATHOLOGY

## 2025-02-03 PROCEDURE — 82232 ASSAY OF BETA-2 PROTEIN: CPT | Mod: ZL

## 2025-02-03 PROCEDURE — 36415 COLL VENOUS BLD VENIPUNCTURE: CPT | Mod: ZL

## 2025-02-03 PROCEDURE — 84155 ASSAY OF PROTEIN SERUM: CPT | Mod: ZL

## 2025-02-03 PROCEDURE — 84165 PROTEIN E-PHORESIS SERUM: CPT | Mod: ZL | Performed by: PATHOLOGY

## 2025-02-03 PROCEDURE — 83521 IG LIGHT CHAINS FREE EACH: CPT | Mod: ZL

## 2025-02-03 NOTE — ORAL ONC MGMT
Oral Chemotherapy Monitoring Program     Medication: Pomalyst  Rx: 1mg PO daily on days 1 through 21 of 28 day cycle   [facility] Auth # [id number]  Routine survey questions reviewed  Rx to be Escribed to [facility]

## 2025-02-04 ENCOUNTER — DOCUMENTATION ONLY (OUTPATIENT)
Dept: PHARMACY | Facility: CLINIC | Age: 75
End: 2025-02-04
Payer: COMMERCIAL

## 2025-02-04 LAB
ALBUMIN SERPL ELPH-MCNC: 4.1 G/DL (ref 3.7–5.1)
ALPHA1 GLOB SERPL ELPH-MCNC: 0.3 G/DL (ref 0.2–0.4)
ALPHA2 GLOB SERPL ELPH-MCNC: 0.9 G/DL (ref 0.5–0.9)
B-GLOBULIN SERPL ELPH-MCNC: 0.7 G/DL (ref 0.6–1)
B2 MICROGLOB TUMOR MARKER SER-MCNC: 2.3 MG/L
GAMMA GLOB SERPL ELPH-MCNC: 0.5 G/DL (ref 0.7–1.6)
IGA SERPL-MCNC: 36 MG/DL (ref 84–499)
IGG SERPL-MCNC: 487 MG/DL (ref 610–1616)
IGM SERPL-MCNC: 32 MG/DL (ref 35–242)
KAPPA LC FREE SER-MCNC: 0.54 MG/DL (ref 0.33–1.94)
KAPPA LC FREE/LAMBDA FREE SER NEPH: 0.93 {RATIO} (ref 0.26–1.65)
LAMBDA LC FREE SERPL-MCNC: 0.58 MG/DL (ref 0.57–2.63)
LOCATION OF TASK: ABNORMAL
M PROTEIN SERPL ELPH-MCNC: 0 G/DL
PROT PATTERN SERPL ELPH-IMP: ABNORMAL
VISC SER: 1.4 CP (ref 1.4–1.8)

## 2025-02-04 NOTE — PROGRESS NOTES
Pharmacist IVIG Stewardship Program    Diagnosis: Hypogammaglobulinemia   Date  12/2/2024   IgG Level (mg/dL) 375     Current Dosing Regimen: Privigen 20g IV every 4 weeks PRN for IgG <600 mg/dL   Patient is dosed as needed based on an IgG level of less than 600 mg/dL to ensure the lowest amount of medication is administered to achieve beneficial outcomes.  Pre-medications:   Acetaminophen 650 mg by mouth, 30 minutes prior to infusion  Diphenhydramine 50 mg by mouth, 30 minutes prior to infusion  Hydrocortisone 100 mg IV PRN for previous reactions to IVIG (per chart review patient has not had IVIG before)  Current Titration Regimen: Start infusion at 0.5 ml/kg/hr x 15 min. If tolerated, increase rate by 0.5 ml/kg/hr every 15 min to a maximum of 4 ml/kg/hr.  Previously Tried Products: None     Side Effects: Unable to reach patient to discuss.     Interventions: Lab review completed.     Assessment:   Date  2/3/2025   IgG Level (mg/dL) 487   Patient is appropriate for IVIG therapy when their level is within parameter. IVIG infusions are effective in increasing IgG levels to an appropriate level to minimize patient's risk of infection. Patient should continue on treatment as they have been per provider order. Without therapy their levels would put them at an increased risk of infections.    Plan: Patient is okay to proceed with infusion on 2/10/25 as her level is below 600 mg/dL. Patient will have additional labs drawn next month to assess the need for ongoing infusions. Patient should not have levels drawn on the same day as IVIG infusion.      Next Review Needed: 3/2025    Karen Menon, PharmD, IgCP  Medication Access Pharmacist      walking

## 2025-02-05 LAB
LOCATION OF TASK: NORMAL
PROT PATTERN SERPL IFE-IMP: NORMAL

## 2025-02-07 ENCOUNTER — LAB (OUTPATIENT)
Dept: LAB | Facility: OTHER | Age: 75
End: 2025-02-07
Payer: COMMERCIAL

## 2025-02-07 DIAGNOSIS — C90.00 MULTIPLE MYELOMA NOT HAVING ACHIEVED REMISSION (H): ICD-10-CM

## 2025-02-07 LAB
ALBUMIN SERPL BCG-MCNC: 4.2 G/DL (ref 3.5–5.2)
ALP SERPL-CCNC: 132 U/L (ref 40–150)
ALT SERPL W P-5'-P-CCNC: 14 U/L (ref 0–50)
ANION GAP SERPL CALCULATED.3IONS-SCNC: 13 MMOL/L (ref 7–15)
AST SERPL W P-5'-P-CCNC: 13 U/L (ref 0–45)
BASOPHILS # BLD AUTO: 0.1 10E3/UL (ref 0–0.2)
BASOPHILS NFR BLD AUTO: 1 %
BILIRUB SERPL-MCNC: 0.4 MG/DL
BUN SERPL-MCNC: 20.6 MG/DL (ref 8–23)
CALCIUM SERPL-MCNC: 9.8 MG/DL (ref 8.8–10.4)
CHLORIDE SERPL-SCNC: 100 MMOL/L (ref 98–107)
CREAT SERPL-MCNC: 0.93 MG/DL (ref 0.51–0.95)
EGFRCR SERPLBLD CKD-EPI 2021: 63 ML/MIN/1.73M2
EOSINOPHIL # BLD AUTO: 0.2 10E3/UL (ref 0–0.7)
EOSINOPHIL NFR BLD AUTO: 3 %
ERYTHROCYTE [DISTWIDTH] IN BLOOD BY AUTOMATED COUNT: 15 % (ref 10–15)
GLUCOSE SERPL-MCNC: 128 MG/DL (ref 70–99)
HCO3 SERPL-SCNC: 26 MMOL/L (ref 22–29)
HCT VFR BLD AUTO: 42.8 % (ref 35–47)
HGB BLD-MCNC: 14.4 G/DL (ref 11.7–15.7)
IMM GRANULOCYTES # BLD: 0 10E3/UL
IMM GRANULOCYTES NFR BLD: 1 %
LYMPHOCYTES # BLD AUTO: 3 10E3/UL (ref 0.8–5.3)
LYMPHOCYTES NFR BLD AUTO: 41 %
MCH RBC QN AUTO: 29.8 PG (ref 26.5–33)
MCHC RBC AUTO-ENTMCNC: 33.6 G/DL (ref 31.5–36.5)
MCV RBC AUTO: 88 FL (ref 78–100)
MONOCYTES # BLD AUTO: 0.9 10E3/UL (ref 0–1.3)
MONOCYTES NFR BLD AUTO: 13 %
NEUTROPHILS # BLD AUTO: 3 10E3/UL (ref 1.6–8.3)
NEUTROPHILS NFR BLD AUTO: 41 %
NRBC # BLD AUTO: 0 10E3/UL
NRBC BLD AUTO-RTO: 0 /100
PLATELET # BLD AUTO: 208 10E3/UL (ref 150–450)
POTASSIUM SERPL-SCNC: 4.1 MMOL/L (ref 3.4–5.3)
PROT SERPL-MCNC: 6.7 G/DL (ref 6.4–8.3)
RBC # BLD AUTO: 4.84 10E6/UL (ref 3.8–5.2)
SODIUM SERPL-SCNC: 139 MMOL/L (ref 135–145)
WBC # BLD AUTO: 7.2 10E3/UL (ref 4–11)

## 2025-02-07 PROCEDURE — 85025 COMPLETE CBC W/AUTO DIFF WBC: CPT | Mod: ZL

## 2025-02-07 PROCEDURE — 80053 COMPREHEN METABOLIC PANEL: CPT | Mod: ZL

## 2025-02-07 PROCEDURE — 84155 ASSAY OF PROTEIN SERUM: CPT | Mod: ZL

## 2025-02-07 PROCEDURE — 36415 COLL VENOUS BLD VENIPUNCTURE: CPT | Mod: ZL

## 2025-02-10 ENCOUNTER — INFUSION THERAPY VISIT (OUTPATIENT)
Dept: INFUSION THERAPY | Facility: OTHER | Age: 75
End: 2025-02-10
Attending: INTERNAL MEDICINE
Payer: MEDICARE

## 2025-02-10 VITALS
TEMPERATURE: 98.4 F | SYSTOLIC BLOOD PRESSURE: 141 MMHG | HEART RATE: 73 BPM | DIASTOLIC BLOOD PRESSURE: 82 MMHG | RESPIRATION RATE: 18 BRPM | OXYGEN SATURATION: 99 %

## 2025-02-10 DIAGNOSIS — C90.00 MULTIPLE MYELOMA NOT HAVING ACHIEVED REMISSION (H): Primary | ICD-10-CM

## 2025-02-10 DIAGNOSIS — D80.1 HYPOGAMMAGLOBULINEMIA: ICD-10-CM

## 2025-02-10 PROCEDURE — 96366 THER/PROPH/DIAG IV INF ADDON: CPT

## 2025-02-10 PROCEDURE — 96367 TX/PROPH/DG ADDL SEQ IV INF: CPT

## 2025-02-10 PROCEDURE — 96365 THER/PROPH/DIAG IV INF INIT: CPT

## 2025-02-10 PROCEDURE — 96375 TX/PRO/DX INJ NEW DRUG ADDON: CPT

## 2025-02-10 PROCEDURE — 258N000003 HC RX IP 258 OP 636: Performed by: INTERNAL MEDICINE

## 2025-02-10 PROCEDURE — 250N000011 HC RX IP 250 OP 636: Mod: JZ | Performed by: INTERNAL MEDICINE

## 2025-02-10 PROCEDURE — 96366 THER/PROPH/DIAG IV INF ADDON: CPT | Performed by: INTERNAL MEDICINE

## 2025-02-10 PROCEDURE — 96401 CHEMO ANTI-NEOPL SQ/IM: CPT

## 2025-02-10 PROCEDURE — 250N000011 HC RX IP 250 OP 636: Mod: JZ | Performed by: NURSE PRACTITIONER

## 2025-02-10 RX ORDER — EPINEPHRINE 1 MG/ML
0.3 INJECTION, SOLUTION, CONCENTRATE INTRAVENOUS EVERY 5 MIN PRN
OUTPATIENT
Start: 2025-02-10

## 2025-02-10 RX ORDER — DIPHENHYDRAMINE HCL 50 MG
50 CAPSULE ORAL ONCE
Start: 2025-02-10

## 2025-02-10 RX ORDER — DEXAMETHASONE 4 MG/1
20 TABLET ORAL ONCE
Status: COMPLETED | OUTPATIENT
Start: 2025-02-10 | End: 2025-02-10

## 2025-02-10 RX ORDER — HEPARIN SODIUM,PORCINE 10 UNIT/ML
5-20 VIAL (ML) INTRAVENOUS DAILY PRN
OUTPATIENT
Start: 2025-02-10

## 2025-02-10 RX ORDER — DIPHENHYDRAMINE HYDROCHLORIDE 50 MG/ML
50 INJECTION INTRAMUSCULAR; INTRAVENOUS
Start: 2025-02-10

## 2025-02-10 RX ORDER — DIPHENHYDRAMINE HYDROCHLORIDE 50 MG/ML
25 INJECTION INTRAMUSCULAR; INTRAVENOUS
Start: 2025-02-10

## 2025-02-10 RX ORDER — ALBUTEROL SULFATE 90 UG/1
1-2 INHALANT RESPIRATORY (INHALATION)
Start: 2025-02-10

## 2025-02-10 RX ORDER — ALBUTEROL SULFATE 0.83 MG/ML
2.5 SOLUTION RESPIRATORY (INHALATION)
OUTPATIENT
Start: 2025-02-10

## 2025-02-10 RX ORDER — ACETAMINOPHEN 325 MG/1
650 TABLET ORAL ONCE
Start: 2025-02-10

## 2025-02-10 RX ORDER — HEPARIN SODIUM (PORCINE) LOCK FLUSH IV SOLN 100 UNIT/ML 100 UNIT/ML
5 SOLUTION INTRAVENOUS
OUTPATIENT
Start: 2025-02-10

## 2025-02-10 RX ORDER — METHYLPREDNISOLONE SODIUM SUCCINATE 40 MG/ML
40 INJECTION INTRAMUSCULAR; INTRAVENOUS
Start: 2025-02-10

## 2025-02-10 RX ORDER — ACETAMINOPHEN 325 MG/1
650 TABLET ORAL ONCE
Status: COMPLETED | OUTPATIENT
Start: 2025-02-10 | End: 2025-02-10

## 2025-02-10 RX ORDER — MONTELUKAST SODIUM 10 MG/1
10 TABLET ORAL ONCE
Status: COMPLETED | OUTPATIENT
Start: 2025-02-10 | End: 2025-02-10

## 2025-02-10 RX ORDER — MEPERIDINE HYDROCHLORIDE 25 MG/ML
25 INJECTION INTRAMUSCULAR; INTRAVENOUS; SUBCUTANEOUS
OUTPATIENT
Start: 2025-02-10

## 2025-02-10 RX ORDER — HYDROCORTISONE SODIUM SUCCINATE 100 MG/2ML
100 INJECTION INTRAMUSCULAR; INTRAVENOUS
OUTPATIENT
Start: 2025-02-10

## 2025-02-10 RX ORDER — DIPHENHYDRAMINE HCL 50 MG
50 CAPSULE ORAL ONCE
Status: COMPLETED | OUTPATIENT
Start: 2025-02-10 | End: 2025-02-10

## 2025-02-10 RX ADMIN — DARATUMUMAB AND HYALURONIDASE-FIHJ (HUMAN RECOMBINANT) 1800 MG: 1800; 30000 INJECTION SUBCUTANEOUS at 10:33

## 2025-02-10 RX ADMIN — SODIUM CHLORIDE 250 ML: 9 INJECTION, SOLUTION INTRAVENOUS at 10:20

## 2025-02-10 RX ADMIN — Medication 50 MG: at 09:58

## 2025-02-10 RX ADMIN — HUMAN IMMUNOGLOBULIN G 20 G: 20 LIQUID INTRAVENOUS at 10:40

## 2025-02-10 RX ADMIN — ACETAMINOPHEN 650 MG: 325 TABLET ORAL at 09:58

## 2025-02-10 RX ADMIN — DEXAMETHASONE 20 MG: 4 TABLET ORAL at 09:58

## 2025-02-10 RX ADMIN — MONTELUKAST SODIUM 10 MG: 10 TABLET ORAL at 09:58

## 2025-02-10 NOTE — PROGRESS NOTES
Post Infusion Assessment:  Patient tolerated infusion without incident.  Site patent and intact, free from redness, edema or discomfort.  No evidence of extravasations.  Access discontinued per protocol.       Discharge Plan:   Copy of AVS reviewed with patient and/or family.  Patient will return as scheduled for next appointment.  Patient discharged in stable condition accompanied by: self.  Departure Mode: Ambulatory.

## 2025-02-10 NOTE — PROGRESS NOTES
Infusion Nursing Note:  Carmelita Watts presents today for IVIG/Faspro.    Patient seen by provider today: No, saw Nida Sky on Friday.   present during visit today: Not Applicable.    Note: Labs done on Friday.      Intravenous Access:  Peripheral IV placed.    Treatment Conditions:  Results reviewed, labs MET treatment parameters, ok to proceed with treatment.

## 2025-03-03 ENCOUNTER — LAB (OUTPATIENT)
Dept: LAB | Facility: OTHER | Age: 75
End: 2025-03-03
Payer: MEDICARE

## 2025-03-03 ENCOUNTER — TELEPHONE (OUTPATIENT)
Dept: ONCOLOGY | Facility: CLINIC | Age: 75
End: 2025-03-03
Payer: COMMERCIAL

## 2025-03-03 DIAGNOSIS — C90.00 MULTIPLE MYELOMA NOT HAVING ACHIEVED REMISSION (H): ICD-10-CM

## 2025-03-03 LAB
ALBUMIN SERPL BCG-MCNC: 4.1 G/DL (ref 3.5–5.2)
ALP SERPL-CCNC: 131 U/L (ref 40–150)
ALT SERPL W P-5'-P-CCNC: 16 U/L (ref 0–50)
ANION GAP SERPL CALCULATED.3IONS-SCNC: 11 MMOL/L (ref 7–15)
AST SERPL W P-5'-P-CCNC: 19 U/L (ref 0–45)
BASOPHILS # BLD AUTO: 0.1 10E3/UL (ref 0–0.2)
BASOPHILS NFR BLD AUTO: 2 %
BILIRUB SERPL-MCNC: 0.4 MG/DL
BUN SERPL-MCNC: 14.3 MG/DL (ref 8–23)
CALCIUM SERPL-MCNC: 9.2 MG/DL (ref 8.8–10.4)
CHLORIDE SERPL-SCNC: 101 MMOL/L (ref 98–107)
CREAT SERPL-MCNC: 0.76 MG/DL (ref 0.51–0.95)
EGFRCR SERPLBLD CKD-EPI 2021: 81 ML/MIN/1.73M2
EOSINOPHIL # BLD AUTO: 0.4 10E3/UL (ref 0–0.7)
EOSINOPHIL NFR BLD AUTO: 10 %
ERYTHROCYTE [DISTWIDTH] IN BLOOD BY AUTOMATED COUNT: 15.5 % (ref 10–15)
GLUCOSE SERPL-MCNC: 130 MG/DL (ref 70–99)
HCO3 SERPL-SCNC: 25 MMOL/L (ref 22–29)
HCT VFR BLD AUTO: 40.9 % (ref 35–47)
HGB BLD-MCNC: 13.7 G/DL (ref 11.7–15.7)
IMM GRANULOCYTES # BLD: 0.1 10E3/UL
IMM GRANULOCYTES NFR BLD: 2 %
LYMPHOCYTES # BLD AUTO: 1.1 10E3/UL (ref 0.8–5.3)
LYMPHOCYTES NFR BLD AUTO: 29 %
MCH RBC QN AUTO: 30.4 PG (ref 26.5–33)
MCHC RBC AUTO-ENTMCNC: 33.5 G/DL (ref 31.5–36.5)
MCV RBC AUTO: 91 FL (ref 78–100)
MONOCYTES # BLD AUTO: 0.4 10E3/UL (ref 0–1.3)
MONOCYTES NFR BLD AUTO: 11 %
NEUTROPHILS # BLD AUTO: 1.8 10E3/UL (ref 1.6–8.3)
NEUTROPHILS NFR BLD AUTO: 47 %
NRBC # BLD AUTO: 0 10E3/UL
NRBC BLD AUTO-RTO: 0 /100
PLATELET # BLD AUTO: 173 10E3/UL (ref 150–450)
POTASSIUM SERPL-SCNC: 3.6 MMOL/L (ref 3.4–5.3)
PROT SERPL-MCNC: 6.7 G/DL (ref 6.4–8.3)
RBC # BLD AUTO: 4.51 10E6/UL (ref 3.8–5.2)
SODIUM SERPL-SCNC: 137 MMOL/L (ref 135–145)
TOTAL PROTEIN SERUM FOR ELP: 6.5 G/DL (ref 6.4–8.3)
WBC # BLD AUTO: 3.8 10E3/UL (ref 4–11)

## 2025-03-03 PROCEDURE — 86334 IMMUNOFIX E-PHORESIS SERUM: CPT | Mod: 26 | Performed by: PATHOLOGY

## 2025-03-03 PROCEDURE — 80051 ELECTROLYTE PANEL: CPT | Mod: ZL

## 2025-03-03 PROCEDURE — 82040 ASSAY OF SERUM ALBUMIN: CPT | Mod: ZL

## 2025-03-03 PROCEDURE — 85810 BLOOD VISCOSITY EXAMINATION: CPT | Mod: ZL

## 2025-03-03 PROCEDURE — 85004 AUTOMATED DIFF WBC COUNT: CPT | Mod: ZL

## 2025-03-03 PROCEDURE — 84165 PROTEIN E-PHORESIS SERUM: CPT | Mod: 26 | Performed by: PATHOLOGY

## 2025-03-03 PROCEDURE — 84165 PROTEIN E-PHORESIS SERUM: CPT | Mod: ZL | Performed by: PATHOLOGY

## 2025-03-03 PROCEDURE — 82947 ASSAY GLUCOSE BLOOD QUANT: CPT | Mod: ZL

## 2025-03-03 PROCEDURE — 83521 IG LIGHT CHAINS FREE EACH: CPT | Mod: ZL

## 2025-03-03 PROCEDURE — 82232 ASSAY OF BETA-2 PROTEIN: CPT | Mod: ZL

## 2025-03-03 PROCEDURE — 36415 COLL VENOUS BLD VENIPUNCTURE: CPT | Mod: ZL

## 2025-03-03 PROCEDURE — 82784 ASSAY IGA/IGD/IGG/IGM EACH: CPT | Mod: ZL

## 2025-03-03 PROCEDURE — 85018 HEMOGLOBIN: CPT | Mod: ZL

## 2025-03-03 PROCEDURE — 86334 IMMUNOFIX E-PHORESIS SERUM: CPT | Mod: ZL | Performed by: PATHOLOGY

## 2025-03-03 PROCEDURE — 84155 ASSAY OF PROTEIN SERUM: CPT | Mod: ZL

## 2025-03-03 NOTE — TELEPHONE ENCOUNTER
Oral Chemotherapy Monitoring Program     Medication: Pomalyst  Rx: 1mg PO daily on days 1 through 21 of 28 day cycle   [facility] AUTH [id number]  Routine survey questions reviewed  Rx to be Escribed to [facility]

## 2025-03-04 ENCOUNTER — DOCUMENTATION ONLY (OUTPATIENT)
Dept: PHARMACY | Facility: CLINIC | Age: 75
End: 2025-03-04
Payer: COMMERCIAL

## 2025-03-04 ENCOUNTER — PATIENT OUTREACH (OUTPATIENT)
Dept: ONCOLOGY | Facility: OTHER | Age: 75
End: 2025-03-04

## 2025-03-04 LAB
ALBUMIN SERPL ELPH-MCNC: 4 G/DL (ref 3.7–5.1)
ALPHA1 GLOB SERPL ELPH-MCNC: 0.3 G/DL (ref 0.2–0.4)
ALPHA2 GLOB SERPL ELPH-MCNC: 0.9 G/DL (ref 0.5–0.9)
B-GLOBULIN SERPL ELPH-MCNC: 0.7 G/DL (ref 0.6–1)
B2 MICROGLOB TUMOR MARKER SER-MCNC: 1.8 MG/L
GAMMA GLOB SERPL ELPH-MCNC: 0.6 G/DL (ref 0.7–1.6)
IGA SERPL-MCNC: 33 MG/DL (ref 84–499)
IGG SERPL-MCNC: 614 MG/DL (ref 610–1616)
IGM SERPL-MCNC: 34 MG/DL (ref 35–242)
KAPPA LC FREE SER-MCNC: 0.53 MG/DL (ref 0.33–1.94)
KAPPA LC FREE/LAMBDA FREE SER NEPH: 1.26 {RATIO} (ref 0.26–1.65)
LAMBDA LC FREE SERPL-MCNC: 0.42 MG/DL (ref 0.57–2.63)
LOCATION OF TASK: ABNORMAL
LOCATION OF TASK: NORMAL
M PROTEIN SERPL ELPH-MCNC: 0 G/DL
PROT PATTERN SERPL ELPH-IMP: ABNORMAL
PROT PATTERN SERPL IFE-IMP: NORMAL
VISC SER: 1.4 CP (ref 1.4–1.8)

## 2025-03-04 NOTE — PROGRESS NOTES
Pharmacist IVIG Stewardship Program    Diagnosis: Hypogammaglobulinemia   Date  12/2/2024   IgG Level (mg/dL) 375     Current Dosing Regimen: Privigen 20g IV every 4 weeks PRN for IgG <600 mg/dL   Patient is dosed as needed based on an IgG level of less than 600 mg/dL to ensure the lowest amount of medication is administered to achieve beneficial outcomes.  Pre-medications:   Acetaminophen 650 mg by mouth, 30 minutes prior to infusion  Diphenhydramine 50 mg by mouth, 30 minutes prior to infusion  Hydrocortisone 100 mg IV PRN for previous reactions to IVIG (per chart review patient has not had IVIG before)  Current Titration Regimen: Start infusion at 0.5 ml/kg/hr x 15 min. If tolerated, increase rate by 0.5 ml/kg/hr every 15 min to a maximum of 4 ml/kg/hr.  Previously Tried Products: None     Side Effects: Unable to reach patient to discuss.     Interventions: Lab review completed.     Assessment:   Date  3/3/2025   IgG Level (mg/dL) 614   Patient is appropriate for IVIG therapy when their level is within parameter. IVIG infusions are effective in increasing IgG levels to an appropriate level to minimize patient's risk of infection. Patient should continue on treatment as they have been per provider order. Without therapy their levels would put them at an increased risk of infections.    Plan: Patient is not okay to proceed with infusion on 3/10/25 as her level is above 600 mg/dL. Care team has been notified that the IVIG portion of the 3/10/25 infusion will need to be cancelled. Patient will have additional labs drawn next month to assess the need for ongoing infusions. Patient should not have levels drawn on the same day as IVIG infusion.      Next Review Needed: 4/2025    Karen Menon, PharmD, IgCP  Medication Access Pharmacist

## 2025-03-07 ENCOUNTER — TELEPHONE (OUTPATIENT)
Dept: ONCOLOGY | Facility: OTHER | Age: 75
End: 2025-03-07

## 2025-03-07 DIAGNOSIS — C90.00 MULTIPLE MYELOMA NOT HAVING ACHIEVED REMISSION (H): Primary | ICD-10-CM

## 2025-03-07 NOTE — TELEPHONE ENCOUNTER
Patient comes Monday for treatment but has a lab appt at 0745, then see's Iram after. Her lab appointment is with the clinic lab, but the CBC and CMP that are in her treatment plan are not ordered for lab use in open orders. They are not signed in treatment plan either, so I can't transcribe them. Can you add a CBC and CMP to open orders for lab use Monday am? Thank you!

## 2025-03-07 NOTE — PROGRESS NOTES
Note routed to Bonita Quintana asking for lab orders to be entered into open orders for lab use, as they are not signed in plan for transcription.

## 2025-03-09 ENCOUNTER — HEALTH MAINTENANCE LETTER (OUTPATIENT)
Age: 75
End: 2025-03-09

## 2025-03-10 ENCOUNTER — INFUSION THERAPY VISIT (OUTPATIENT)
Dept: INFUSION THERAPY | Facility: OTHER | Age: 75
End: 2025-03-10
Attending: INTERNAL MEDICINE
Payer: MEDICARE

## 2025-03-10 ENCOUNTER — APPOINTMENT (OUTPATIENT)
Dept: LAB | Facility: OTHER | Age: 75
End: 2025-03-10
Payer: MEDICARE

## 2025-03-10 ENCOUNTER — ONCOLOGY VISIT (OUTPATIENT)
Dept: ONCOLOGY | Facility: OTHER | Age: 75
End: 2025-03-10
Attending: INTERNAL MEDICINE
Payer: MEDICARE

## 2025-03-10 VITALS
WEIGHT: 174.6 LBS | HEIGHT: 63 IN | OXYGEN SATURATION: 96 % | HEART RATE: 79 BPM | DIASTOLIC BLOOD PRESSURE: 62 MMHG | TEMPERATURE: 97.9 F | BODY MASS INDEX: 30.94 KG/M2 | SYSTOLIC BLOOD PRESSURE: 112 MMHG

## 2025-03-10 DIAGNOSIS — D80.1 HYPOGAMMAGLOBULINEMIA: ICD-10-CM

## 2025-03-10 DIAGNOSIS — C90.00 MULTIPLE MYELOMA NOT HAVING ACHIEVED REMISSION (H): Primary | ICD-10-CM

## 2025-03-10 DIAGNOSIS — M89.8X9 BONE PAIN: ICD-10-CM

## 2025-03-10 DIAGNOSIS — M89.9 BONE LESION: ICD-10-CM

## 2025-03-10 LAB
ALBUMIN SERPL BCG-MCNC: 4 G/DL (ref 3.5–5.2)
ALP SERPL-CCNC: 135 U/L (ref 40–150)
ALT SERPL W P-5'-P-CCNC: 13 U/L (ref 0–50)
ANION GAP SERPL CALCULATED.3IONS-SCNC: 8 MMOL/L (ref 7–15)
AST SERPL W P-5'-P-CCNC: 14 U/L (ref 0–45)
BASOPHILS # BLD AUTO: 0.1 10E3/UL (ref 0–0.2)
BASOPHILS NFR BLD AUTO: 1 %
BILIRUB SERPL-MCNC: 0.4 MG/DL
BUN SERPL-MCNC: 15.1 MG/DL (ref 8–23)
CALCIUM SERPL-MCNC: 9.2 MG/DL (ref 8.8–10.4)
CHLORIDE SERPL-SCNC: 102 MMOL/L (ref 98–107)
CREAT SERPL-MCNC: 0.81 MG/DL (ref 0.51–0.95)
EGFRCR SERPLBLD CKD-EPI 2021: 75 ML/MIN/1.73M2
EOSINOPHIL # BLD AUTO: 0.3 10E3/UL (ref 0–0.7)
EOSINOPHIL NFR BLD AUTO: 5 %
ERYTHROCYTE [DISTWIDTH] IN BLOOD BY AUTOMATED COUNT: 15.1 % (ref 10–15)
GLUCOSE SERPL-MCNC: 168 MG/DL (ref 70–99)
HCO3 SERPL-SCNC: 28 MMOL/L (ref 22–29)
HCT VFR BLD AUTO: 40.3 % (ref 35–47)
HGB BLD-MCNC: 13.5 G/DL (ref 11.7–15.7)
IMM GRANULOCYTES # BLD: 0.1 10E3/UL
IMM GRANULOCYTES NFR BLD: 1 %
LYMPHOCYTES # BLD AUTO: 2.3 10E3/UL (ref 0.8–5.3)
LYMPHOCYTES NFR BLD AUTO: 36 %
MCH RBC QN AUTO: 29.9 PG (ref 26.5–33)
MCHC RBC AUTO-ENTMCNC: 33.5 G/DL (ref 31.5–36.5)
MCV RBC AUTO: 89 FL (ref 78–100)
MONOCYTES # BLD AUTO: 0.8 10E3/UL (ref 0–1.3)
MONOCYTES NFR BLD AUTO: 13 %
NEUTROPHILS # BLD AUTO: 2.9 10E3/UL (ref 1.6–8.3)
NEUTROPHILS NFR BLD AUTO: 45 %
NRBC # BLD AUTO: 0 10E3/UL
NRBC BLD AUTO-RTO: 0 /100
PLATELET # BLD AUTO: 227 10E3/UL (ref 150–450)
POTASSIUM SERPL-SCNC: 4.1 MMOL/L (ref 3.4–5.3)
PROT SERPL-MCNC: 6.4 G/DL (ref 6.4–8.3)
RBC # BLD AUTO: 4.51 10E6/UL (ref 3.8–5.2)
SODIUM SERPL-SCNC: 138 MMOL/L (ref 135–145)
WBC # BLD AUTO: 6.4 10E3/UL (ref 4–11)

## 2025-03-10 PROCEDURE — 85018 HEMOGLOBIN: CPT | Mod: ZL | Performed by: NURSE PRACTITIONER

## 2025-03-10 PROCEDURE — 84520 ASSAY OF UREA NITROGEN: CPT | Mod: ZL | Performed by: NURSE PRACTITIONER

## 2025-03-10 PROCEDURE — 82435 ASSAY OF BLOOD CHLORIDE: CPT | Mod: ZL | Performed by: NURSE PRACTITIONER

## 2025-03-10 PROCEDURE — 85004 AUTOMATED DIFF WBC COUNT: CPT | Mod: ZL | Performed by: NURSE PRACTITIONER

## 2025-03-10 PROCEDURE — 36415 COLL VENOUS BLD VENIPUNCTURE: CPT | Mod: ZL | Performed by: NURSE PRACTITIONER

## 2025-03-10 PROCEDURE — 84155 ASSAY OF PROTEIN SERUM: CPT | Mod: ZL | Performed by: NURSE PRACTITIONER

## 2025-03-10 PROCEDURE — G0463 HOSPITAL OUTPT CLINIC VISIT: HCPCS

## 2025-03-10 PROCEDURE — 250N000011 HC RX IP 250 OP 636: Mod: JZ | Performed by: NURSE PRACTITIONER

## 2025-03-10 RX ORDER — EPINEPHRINE 1 MG/ML
0.3 INJECTION, SOLUTION, CONCENTRATE INTRAVENOUS EVERY 5 MIN PRN
Status: CANCELLED | OUTPATIENT
Start: 2025-03-10

## 2025-03-10 RX ORDER — ACETAMINOPHEN 325 MG/1
650 TABLET ORAL ONCE
Status: CANCELLED | OUTPATIENT
Start: 2025-03-10

## 2025-03-10 RX ORDER — ALBUTEROL SULFATE 0.83 MG/ML
2.5 SOLUTION RESPIRATORY (INHALATION)
Status: CANCELLED | OUTPATIENT
Start: 2025-03-10

## 2025-03-10 RX ORDER — DEXAMETHASONE SODIUM PHOSPHATE 10 MG/ML
20 INJECTION, SOLUTION INTRAMUSCULAR; INTRAVENOUS ONCE
Status: CANCELLED
Start: 2025-03-10 | End: 2025-03-10

## 2025-03-10 RX ORDER — MONTELUKAST SODIUM 10 MG/1
10 TABLET ORAL ONCE
Status: CANCELLED | OUTPATIENT
Start: 2025-03-10

## 2025-03-10 RX ORDER — ACETAMINOPHEN 325 MG/1
650 TABLET ORAL ONCE
Status: COMPLETED | OUTPATIENT
Start: 2025-03-10 | End: 2025-03-10

## 2025-03-10 RX ORDER — DIPHENHYDRAMINE HYDROCHLORIDE 50 MG/ML
50 INJECTION, SOLUTION INTRAMUSCULAR; INTRAVENOUS
Status: CANCELLED
Start: 2025-03-10

## 2025-03-10 RX ORDER — MONTELUKAST SODIUM 10 MG/1
10 TABLET ORAL ONCE
Status: COMPLETED | OUTPATIENT
Start: 2025-03-10 | End: 2025-03-10

## 2025-03-10 RX ORDER — METHYLPREDNISOLONE SODIUM SUCCINATE 125 MG/2ML
125 INJECTION INTRAMUSCULAR; INTRAVENOUS
Status: CANCELLED
Start: 2025-03-10

## 2025-03-10 RX ORDER — DIPHENHYDRAMINE HCL 50 MG
50 CAPSULE ORAL ONCE
Status: CANCELLED | OUTPATIENT
Start: 2025-03-10

## 2025-03-10 RX ORDER — DEXAMETHASONE 4 MG/1
20 TABLET ORAL ONCE
Status: COMPLETED | OUTPATIENT
Start: 2025-03-10 | End: 2025-03-10

## 2025-03-10 RX ORDER — DEXAMETHASONE 4 MG/1
TABLET ORAL
Qty: 15 TABLET | Refills: 0 | Status: SHIPPED | OUTPATIENT
Start: 2025-03-10

## 2025-03-10 RX ORDER — DIPHENHYDRAMINE HCL 50 MG
50 CAPSULE ORAL ONCE
Status: COMPLETED | OUTPATIENT
Start: 2025-03-10 | End: 2025-03-10

## 2025-03-10 RX ORDER — ACETAMINOPHEN 325 MG/1
650 TABLET ORAL
Status: CANCELLED | OUTPATIENT
Start: 2025-03-10

## 2025-03-10 RX ORDER — DIPHENHYDRAMINE HCL 50 MG
50 CAPSULE ORAL
Status: CANCELLED | OUTPATIENT
Start: 2025-03-10

## 2025-03-10 RX ORDER — LORAZEPAM 2 MG/ML
0.5 INJECTION INTRAMUSCULAR EVERY 4 HOURS PRN
Status: CANCELLED | OUTPATIENT
Start: 2025-03-10

## 2025-03-10 RX ORDER — DEXAMETHASONE 4 MG/1
20 TABLET ORAL ONCE
Status: CANCELLED | OUTPATIENT
Start: 2025-03-10

## 2025-03-10 RX ORDER — MEPERIDINE HYDROCHLORIDE 25 MG/ML
25 INJECTION INTRAMUSCULAR; INTRAVENOUS; SUBCUTANEOUS EVERY 30 MIN PRN
Status: CANCELLED | OUTPATIENT
Start: 2025-03-10

## 2025-03-10 RX ORDER — ALBUTEROL SULFATE 90 UG/1
1-2 INHALANT RESPIRATORY (INHALATION)
Status: CANCELLED
Start: 2025-03-10

## 2025-03-10 RX ADMIN — DARATUMUMAB AND HYALURONIDASE-FIHJ (HUMAN RECOMBINANT) 1800 MG: 1800; 30000 INJECTION SUBCUTANEOUS at 09:52

## 2025-03-10 RX ADMIN — DEXAMETHASONE 20 MG: 4 TABLET ORAL at 09:17

## 2025-03-10 RX ADMIN — ACETAMINOPHEN 650 MG: 325 TABLET ORAL at 09:17

## 2025-03-10 RX ADMIN — MONTELUKAST SODIUM 10 MG: 10 TABLET ORAL at 09:17

## 2025-03-10 RX ADMIN — Medication 50 MG: at 09:16

## 2025-03-10 ASSESSMENT — PAIN SCALES - GENERAL: PAINLEVEL_OUTOF10: NO PAIN (0)

## 2025-03-10 NOTE — NURSING NOTE
"Oncology Rooming Note    March 10, 2025 8:25 AM   Carmelita Watts is a 74 year old female who presents for:    Chief Complaint   Patient presents with    Oncology Clinic Visit     Follow up. Multiple myeloma not having achieved remission      Initial Vitals: /62 (BP Location: Right arm, Patient Position: Sitting, Cuff Size: Adult Regular)   Pulse 79   Temp 97.9  F (36.6  C) (Tympanic)   Ht 1.6 m (5' 3\")   Wt 79.2 kg (174 lb 9.7 oz)   SpO2 96%   BMI 30.93 kg/m   Estimated body mass index is 30.93 kg/m  as calculated from the following:    Height as of this encounter: 1.6 m (5' 3\").    Weight as of this encounter: 79.2 kg (174 lb 9.7 oz). Body surface area is 1.88 meters squared.  No Pain (0) Comment: Data Unavailable   No LMP recorded. Patient is postmenopausal.  Allergies reviewed: Yes  Medications reviewed: Yes    Medications: Medication refills not needed today.  Pharmacy name entered into [facility]:    [id number] - [place], [place] - [address] [place] [facility] [place] [facility] [address]  [facility] - [place], [place] - [address]    Frailty Screening:   Is the patient here for a new oncology consult visit in cancer care? 2. No    PHQ9:  Did this patient require a PHQ9?: No      Clinical concerns: none       Julia See LPN              "

## 2025-03-10 NOTE — PROGRESS NOTES
Oncology Follow-up Visit    Reason for Visit:  Carmelita is a 74 year old woman with a diagnosis of multiple myeloma, who I am visiting with today for routine follow-up and consideration of ongoing therapy.     Nursing Note and documentation reviewed: Yes    Interval History:   Carlene notes that she is doing well. Notes that she has had some bone pain in her facial bones. Denies this as sinus pressure or pain. Only really feels it when she presses on face. This is similar to sternum. Also, has noticed over the last few month that she will occasionally note a hand tremor. Intermittent and stops if she clenches her fist. No other neurologic symptoms.     She denies recent signs of infection. No fever, chills, chest pain, cough, or SOB. No bleeding concerns. Very sporadic nausea, took 1/2 compazine once this last month. No bowel concerns. Eating okay. Energy is down at times, but a slight nap always helps her stay active.     Oncologic History  12/31/2018 Presented to the emergency room with vertigo and fatigue.  CT scan of the head was negative and subsequent stress test was negative.  05/03/2019 PCP ordered lab work and noted a total protein of 12.9.  SPEP at that time showed an M spike of 6.2 with a large monoclonal protein seen in the gamma fraction. Urine immunofixation showed a possible small protein band in the gamma fraction  05/31/2019 Evaluated by Dr. Walker with Medical Oncology with plan to rule out myeloma and obtain bone marrow aspiration biopsy as well as a metastatic bone survey along with additional labwork  06/18/2019 Underwent bone marrow aspiration and biopsy  06/24/2019 Seen again by Dr. Walker and CBC showed a hemoglobin of 9.3, M spike 7.3 with monoclonal IgG immunoglobulin of kappa light chain type; serum viscosity was 2.9; quantitative immunoglobulins showed an IgG of 8160, beta-2 microglobulin was 5.8, BUN was 21 with creatinine is 0.8 and total protein was 13.7.  Quantitative kappa/lambda free  light chains showed an elevated ratio of 17.0 bone marrow aspiration biopsy showed plasma cell myeloma with approximately 80% plasma cells.  Immunofixation showed IgG kappa and flow cytometry revealed kappa monotypic plasma cells consistent with clonal plasma cell neoplasm and FISH panel was pending at that time. It was felt she had at least stage II disease based on her beta-2 microglobulin and anemia. Plan was to treat with Velcade 1.3 mg per/m2 days 1, 4, 8 and 11/Decadron 40 mg on days 1, 8 and 15. Initiation of Revlimid with C2 at 25 mg daily days 1 through 14.  Plan was to also obtain an MRI of the lumbar spine to rule out lytic lesion at L3.  She was initiated on Zovirax 400 mg p.o. twice daily.  06/25/2019 C1 of chemotherapy initiated  07/01/2019 Note in chart regarding patient's large co-pay for the Revlimid and no plan at this point to initiate Revlimid and treat only with Velcade and Decadron per Dr. Walker  07/11/2019 MRI lumbar spine shows a pathologic superior endplate compression fracture at L3 without evidence of retropulsed fragment and innumerable enhancing lesions throughout the lumbar spine consistent with history of multiple myeloma.  09/10/2019 Increasing M-spike and kappa lambda ratio  10/01/2019 Initiation of CyBorD  11/17/2020 Plateau of M-spike at 1.2 after 36 cycles of CyBorD  12/01/2020 Initiation of Darzalex Faspro injection  03/23/2021 M-spike increased form 1.0 to 1.2 and velcade and dexamethasone added to plan  04/12/2022 Treatment HOLIDAY commenced with Mspike 0.3 after 22 cycles of treatent  05/27/2022 Received Evusheld while on treatment holiday.   07/18/2023 Mspike 0.8. Other labs stable. PET scan ordered.   08/02/2023 PET completed showing negative study. No evidence of mass or lymphadenopathy. No concerning hypermetabolism is present.  08/14/2023 Met with Dr. Walker. Given biochemical progression, he did feel appropriate to resume therapy again with Darzalex  faspro  03/12/2023 Following C7, mspike magy from 0.2-->0.8. PET ordered which was negative. Again increased frequency of Darzalex with anticipation of adding in Pomalyst with C2.    04/29/2024 Addition of Pomalyst with C2  12/16/2024 IVIG added for suppressed IgG levels and increased infections    Current Chemo Regime/TX: Darzalex faspro injection 1800mg subcutaneous per protocol and Pomalyst 1 mg days 1-21 of 28 day cycle given with weekly Dex  Current Cycle: C13  Completed cycles: 12  **Pom added C2 (4/29)     Previous treatment:     Velcade 1.3 mg/m2 days 1, 4, 8 and 11 with Decadron 40 mg days 1, 8 and 15 x 4 cycles;     Velcade 1.5mg.m2, cyclophosphamide 150mg every 7 days on days 1,8,15 and 22 with decadron 40mg days 1,8,15,22  Darzalex faspro injection 1800mg subcutaneous per protocol    Past Medical History:   Diagnosis Date    Arthritis     Cyst of breast     Depressive disorder     Diabetes mellitus, type 2 (H) 01/18/2021    Essential hypertension 10/01/2015    Major depressive disorder, recurrent episode, mild 10/01/2015    Mixed hyperlipidemia 7/5/2013    Multiple myeloma not having achieved remission (H) 06/24/2019    Other specified disorders of bladder 07/09/2012    Irritable Bladder    Seasonal allergies 10/01/2015    Unspecified essential hypertension 03/19/2007    Unspecified sinusitis (chronic) 09/05/2007       Past Surgical History:   Procedure Laterality Date    APPENDECTOMY      BONE MARROW BIOPSY, BONE SPECIMEN, NEEDLE/TROCAR N/A 06/18/2019    Procedure: BONE MARROW BIOPSY;  Surgeon: Maciej Sanz MD;  Location: [facility]    CHOLECYSTECTOMY      COLONOSCOPY  07/18/2006    repeat 10 years    COLONOSCOPY N/A 12/30/2016    Procedure: COLONOSCOPY;  Surgeon: Bhaskar Franklin DO;  Location: [facility]    PUNC/ASPIR BREAST CYST Left 1995    benign    SINUS SURGERY      TUBAL STERILIZATION         Family History   Problem Relation Age of Onset    Breast Cancer Mother 60        Cause of Death; 1 breast  "   Parkinsonism Father         \"Possible\"    Coronary Artery Disease Father     Pacemaker Father     Thyroid Disease Daughter     Breast Cancer Maternal Aunt         over 50    Diabetes No family hx of     Hypertension No family hx of     Hyperlipidemia No family hx of     Cerebrovascular Disease No family hx of     Colon Cancer No family hx of     Prostate Cancer No family hx of     Genetic Disorder No family hx of     Asthma No family hx of     Anesthesia Reaction No family hx of        Social History     Socioeconomic History    Marital status:      Spouse name: Not on file    Number of children: Not on file    Years of education: Not on file    Highest education level: Not on file   Occupational History    Occupation: Financial     Comment:  - (FT)   Tobacco Use    Smoking status: Never    Smokeless tobacco: Never    Tobacco comments:     Tried to Quit (YES); QUIT in 1971; Passive Exposure (NO)   Vaping Use    Vaping status: Never Used   Substance and Sexual Activity    Alcohol use: Yes     Comment: RARELY    Drug use: No    Sexual activity: Yes     Partners: Male     Birth control/protection: None   Other Topics Concern     Service Not Asked    Blood Transfusions Not Asked    Caffeine Concern Yes     Comment: Coffee >6 cups daily    Occupational Exposure Not Asked    Hobby Hazards Not Asked    Sleep Concern Not Asked    Stress Concern Not Asked    Weight Concern Not Asked    Special Diet Not Asked    Back Care Not Asked    Exercise Not Asked    Bike Helmet Not Asked    Seat Belt Not Asked    Self-Exams Not Asked    Parent/sibling w/ CABG, MI or angioplasty before 65F 55M? No   Social History Narrative    Not on file     Social Drivers of Health     Financial Resource Strain: Low Risk  (1/7/2025)    Financial Resource Strain     Within the past 12 months, have you or your family members you live with been unable to get utilities (heat, electricity) when it was really needed?: No "   Food Insecurity: Low Risk  (1/7/2025)    Food Insecurity     Within the past 12 months, did you worry that your food would run out before you got money to buy more?: No     Within the past 12 months, did the food you bought just not last and you didn t have money to get more?: No   Transportation Needs: Low Risk  (1/7/2025)    Transportation Needs     Within the past 12 months, has lack of transportation kept you from medical appointments, getting your medicines, non-medical meetings or appointments, work, or from getting things that you need?: No   Physical Activity: Not on file   Stress: Not on file   Social Connections: Not on file   Interpersonal Safety: Low Risk  (3/10/2025)    Interpersonal Safety     Do you feel physically and emotionally safe where you currently live?: Yes     Within the past 12 months, have you been hit, slapped, kicked or otherwise physically hurt by someone?: No     Within the past 12 months, have you been humiliated or emotionally abused in other ways by your partner or ex-partner?: No   Housing Stability: Low Risk  (1/7/2025)    Housing Stability     Do you have housing? : Yes     Are you worried about losing your housing?: No       Current Outpatient Medications   Medication Sig Dispense Refill    acyclovir (ZOVIRAX) 400 MG tablet Take 1 tablet (400 mg) by mouth 2 times daily Start 1 week prior to daratumumab and continue for 3 months after daratumumab treatment complete. 60 tablet 11    aspirin (ASA) 81 MG chewable tablet Take 81 mg by mouth daily      atorvastatin (LIPITOR) 20 MG tablet TAKE 1 TABLET BY MOUTH DAILY 90 tablet 3    cetirizine (ZYRTEC) 10 MG tablet Take 1 tablet (10 mg) by mouth daily 30 tablet 1    dexAMETHasone (DECADRON) 4 MG tablet Take 5 tablets (20 mg) on Days 8, 15, and 22. 15 tablet 0    diphenhydrAMINE (BENADRYL) 25 MG capsule Take 25 mg by mouth daily      fluticasone (FLONASE) 50 MCG/ACT nasal spray SPRAY 2 SPRAYS INTO BOTH NOSTRILS DAILY 48 mL 0     hydrocortisone (CORTAID) 1 % external cream Apply topically 2 times daily sparingly over rash to face and chest 45 g 0    losartan (COZAAR) 50 MG tablet TAKE 1 TABLET BY MOUTH DAILY 90 tablet 0    pomalidomide (POMALYST) 1 MG capsule Take 1 capsule (1 mg) by mouth daily for 21 days Day 1 thru 21 of each 28 day cycle. Swallow whole with water. 21 capsule 0    sertraline (ZOLOFT) 50 MG tablet TAKE 1/2 TABLET BY MOUTH DAILY 45 tablet 3    L-Lysine HCl 500 MG CAPS Take by mouth daily. (Patient not taking: Reported on 3/10/2025)      prochlorperazine (COMPAZINE) 10 MG tablet Take 1 tablet (10 mg) by mouth every 6 hours as needed for nausea or vomiting (Patient not taking: Reported on 1/14/2025) 30 tablet 2     No current facility-administered medications for this visit.     Facility-Administered Medications Ordered in Other Visits   Medication Dose Route Frequency Provider Last Rate Last Admin    acetaminophen (TYLENOL) tablet 650 mg  650 mg Oral Once Nida Gonzalez APRN CNP        daratumumab-hyaluronidase-fihj (DARZALEX FASPRO) SUBCUTANEOUS injection 1,800 mg  1,800 mg Subcutaneous Once Nida Gonzalez APRN CNP        dexAMETHasone (DECADRON) tablet 20 mg  20 mg Oral Once Nida Gonzalez APRN CNP        diphenhydrAMINE (BENADRYL) capsule 50 mg  50 mg Oral Once Nida Gonzalez APRN CNP        montelukast (SINGULAIR) tablet 10 mg  10 mg Oral Once Nida Gonzalez APRN CNP            Allergies   Allergen Reactions    Lisinopril Cough    Phenylephrine Hcl Other (See Comments)     **Entex  HEADACHE (SEVERE)    Phenylpropanolamine Other (See Comments)     **Entex  HEADACHE (SEVERE)    Pseudoephedrine Tannate Other (See Comments)     **Entex  HEADACHE (SEVERE)    Levofloxacin Rash     **Levaquin    Moxifloxacin Hcl [Moxifloxacin Hcl In Nacl] Rash       Review Of Systems:  A complete review of systems is negative except for the above mentioned items in the interval history.     ECOG PS:  "0    Physical Exam:  /62 (BP Location: Right arm, Patient Position: Sitting, Cuff Size: Adult Regular)   Pulse 79   Temp 97.9  F (36.6  C) (Tympanic)   Ht 1.6 m (5' 3\")   Wt 79.2 kg (174 lb 9.7 oz)   SpO2 96%   BMI 30.93 kg/m    GENERAL APPEARANCE: Alert and in no acute distress.  HEENT: Eyes appear normal without scleral icterus. Extraocular movements intact.   NECK:   Supple with normal range of motion. No asymmetry or masses.   LYMPHATICS: No palpable cervical, supraclavicular nodes.  RESP: Lungs clear to auscultation bilaterally, respirations regular and easy.  CARDIOVASCULAR: Regular rate and rhythm. Normal S1, S2; no murmur, gallop, or rub.  ABDOMEN: Soft, non-tender, non-distended. No palpable organomegaly or masses.  MUSCULOSKELETAL: Extremities without gross deformities noted. No edema of bilateral lower extremities.  SKIN: No suspicious lesions or rashes.  NEURO: Alert and oriented x 3.  Gait steady.  PSYCHIATRIC: Mentation and affect appear normal.  Mood appropriate.    Laboratory:  Component      Latest Ref Rng 3/3/2025  10:21 AM   Albumin Fraction      3.7 - 5.1 g/dL 4.0    Alpha 1 Fraction      0.2 - 0.4 g/dL 0.3    Alpha 2 Fraction      0.5 - 0.9 g/dL 0.9    Beta Fraction      0.6 - 1.0 g/dL 0.7    Gamma Fraction      0.7 - 1.6 g/dL 0.6 (L)    Monoclonal Peak      <=0.0 g/dL 0.0    ELP Interpretation: Essentially normal electrophoretic pattern. No obvious monoclonal proteins seen. Pathologic significance requires clinical correlation. Basim Tomlinson M.D., Ph.D., Pathologist.    Signout Location if Remote BTH1    Signout Location if Remote BTH1    IGG      610 - 1,616 mg/dL 614    IGA      84 - 499 mg/dL 33 (L)    IGM      35 - 242 mg/dL 34 (L)    [place] Free Lt Chain      0.33 - 1.94 mg/dL 0.53    Lambda Free Lt Chain      0.57 - 2.63 mg/dL 0.42 (L)    Kappa Lambda Ratio      0.26 - 1.65  1.26    Immunofixation ELP No monoclonal protein seen on immunofixation. Pathologic significance " requires clinical correlation. Basim Tomlinson M.D., Ph.D., Pathologist    Beta-2-Microglobulin      <2.3 mg/L 1.8    Viscosity Index      1.4 - 1.8  1.4    Total Protein Serum for ELP      6.4 - 8.3 g/dL 6.5       Legend:  (L) Low    Imaging Studies:  None this visit.     ASSESSMENT/PLAN:  #1 Multiple myeloma IgG kappa multiple myeloma with 80% involvement of the bone marrow diagnosed June 2019 initially treated with Velcade and Decadron and received 4 cycles with increasing M-spike and kappa/lambda ratio.  Treatment changed to CyBorD on 10/1/2019.  M-spike plateau at 1.2 and treatment changed to monotherapy with Darzalex Faspro injection. M spike declined to 1.0 then increased again to 1.2 so Velcade and Dex added to her treatment plan with cycle 5 therapy. Following C22 (April 2022), she did initiate a treatment holiday when her Mspike was 0.3.     We had followed her counts for over a year. Unfortunately, more recently, she did see a rise in her mspike to 0.8. PET completed was negative. Dr. Walker felt this was a biochemical progression and after discussion with patient, elected to resume treatment with Darzalex faspro. Mspike began to rise. PET was negative but feeling like she wasn't responding to Darzalex alone, decided to add in Pomalyst 1 mg p.o. daily on days 1 through 21 to begin with cycle 2 in addition to dexamethasone 20 mg p.o. on days 1 ,8,15,22.    Today, she is doing well. No big concerns. Labs are very good, M spike 0.0. I see no contraindications in proceeding with next cycle. Due to start Pomalyst tomorrow. Refill of Dex provided. Will get Darzalaex today. I will check big labs in 3 weeks and see her back in 4 weeks with CBC and CMP prior, sooner with concerns.     #2 Lytic lesions Hx lytic lesion at L3. Zometa Q3M, due April 2025. She remains on calcium with vitamin D twice a day. Weight bearing activity.    #3 Bone pain Continues to note sporadic bone pain in sternum, ribs, and now her  facial bones. Primarily when she pushes on bone themselves. Her myeloma numbers are beautiful with no detectable monoclonal protein so I don't believe this is related to progressive disease. Upon review, it does appear that the Pomalyst can cause musculoskeletal pain, as can Zometa. For now, will continue to monitor. If mspike starts to climb, will likely want to proceed with imaging.     #4 Hypogammaglobulinemia Hx decreased IgG levels and recurrent ear infections, Dr. Walker elected to commence IVIG. This is to be given every 4 weeks for IgG levels less than 600. Levels recently 614. We will therefore hold IVIG this month.     #5 Tremor Has been developing over last couple months in her hand. First being made aware of today. No other neurologic symptoms. Upon review, a tremor can occur in 10% of individuals on Pomalyst. This is sporadic and clenching her fist resolves the tremor. For now, will continue to monitor.         Patient in agreement with plan and verbalizes understanding. Agrees to call with any questions or concerns.      53 minutes spent in the patient's encounter today with time spent in review of patient's chart along with chart preparation and review of the treatment plan and signing of treatment plan.  Time was also spent with the patient in obtaining a review of systems and performing a physical exam along with detailed review of all test results. Time was also spent in discussing plan for future follow-up and relating instructions for follow-up and in placing future orders.    The longitudinal plan of care for the diagnosis(es)/condition(s) as documented were addressed during this visit. Due to the added complexity in care, I will continue to support Carmelita in the subsequent management and with ongoing continuity of care.        Nida Gonzalze, FLO, FNP-C

## 2025-03-10 NOTE — PROGRESS NOTES
Infusion Nursing Note:  Carmelita Watts presents today for Faslodex.    Patient seen by provider today: Yes: DILMA Gonzalez   present during visit today: Not Applicable.    Note: see provider assessment on this date for details.    Intravenous Access:  No Intravenous access/labs at this visit.    Treatment Conditions:  Results reviewed, labs MET treatment parameters, ok to proceed with treatment.    Component      Latest Ref Rng 3/10/2025  7:44 AM   WBC      4.0 - 11.0 10e3/uL 6.4    RBC Count      3.80 - 5.20 10e6/uL 4.51    Hemoglobin      11.7 - 15.7 g/dL 13.5    Hematocrit      35.0 - 47.0 % 40.3    MCV      78 - 100 fL 89    MCH      26.5 - 33.0 pg 29.9    MCHC      31.5 - 36.5 g/dL 33.5    RDW      10.0 - 15.0 % 15.1 (H)    Platelet Count      150 - 450 10e3/uL 227    % Neutrophils      % 45    % Lymphocytes      % 36    % Monocytes      % 13    % Eosinophils      % 5    % Basophils      % 1    % Immature Granulocytes      % 1    NRBCs per 100 WBC      <1 /100 0    Absolute Neutrophils      1.6 - 8.3 10e3/uL 2.9    Absolute Lymphocytes      0.8 - 5.3 10e3/uL 2.3    Absolute Monocytes      0.0 - 1.3 10e3/uL 0.8    Absolute Eosinophils      0.0 - 0.7 10e3/uL 0.3    Absolute Basophils      0.0 - 0.2 10e3/uL 0.1    Absolute Immature Granulocytes      <=0.4 10e3/uL 0.1    Absolute NRBCs      10e3/uL 0.0    Sodium      135 - 145 mmol/L 138    Potassium      3.4 - 5.3 mmol/L 4.1    Carbon Dioxide (CO2)      22 - 29 mmol/L 28    Anion Gap      7 - 15 mmol/L 8    Urea Nitrogen      8.0 - 23.0 mg/dL 15.1    Creatinine      0.51 - 0.95 mg/dL 0.81    GFR Estimate      >60 mL/min/1.73m2 75    Calcium      8.8 - 10.4 mg/dL 9.2    Chloride      98 - 107 mmol/L 102    Glucose      70 - 99 mg/dL 168 (H)    Alkaline Phosphatase      40 - 150 U/L 135    AST      0 - 45 U/L 14    ALT      0 - 50 U/L 13    Protein Total      6.4 - 8.3 g/dL 6.4    Albumin      3.5 - 5.2 g/dL 4.0    Bilirubin Total      <=1.2 mg/dL 0.4        Legend:  (H) High    Post Infusion Assessment:  Patient tolerated injection without incident.     The following medication was given:     MEDICATION: Faspro  ROUTE: SQ  SITE: RUQ - Abd.  DOSE: 1800mg    Discharge Plan:   Patient discharged in stable condition accompanied by: self.  Departure Mode: Ambulatory.

## 2025-03-24 ENCOUNTER — TELEPHONE (OUTPATIENT)
Dept: ONCOLOGY | Facility: CLINIC | Age: 75
End: 2025-03-24
Payer: COMMERCIAL

## 2025-03-24 DIAGNOSIS — I10 ESSENTIAL HYPERTENSION WITH GOAL BLOOD PRESSURE LESS THAN 140/90: ICD-10-CM

## 2025-03-24 RX ORDER — LOSARTAN POTASSIUM 50 MG/1
50 TABLET ORAL DAILY
Qty: 90 TABLET | Refills: 3 | Status: SHIPPED | OUTPATIENT
Start: 2025-03-24

## 2025-03-24 NOTE — TELEPHONE ENCOUNTER
Prior Authorization Not Needed per Insurance    Medication: POMALYST 1 MG PO CAPS  Insurance Company: [employer] Prior Auth Dept, phone  [phone], Fax [phone]  Expected CoPay: $    Pharmacy Filling the Rx: [facility] - [place], [place] - [address]   Pharmacy Notified: N/A - Renewal  Patient Notified: N/A - Renewal        Per call to [facility] at [phone], spoke with Ofe - She stated that claims pay through July but reject in August. Reminder has been set for 8/1 to process new prior authorization and provided updates to Aracelis to keep an eye on this.    Thank you,  Veronika Sam Oncology/Transplant Liaison  Phone: [phone]  Fax: [phone]

## 2025-03-26 ENCOUNTER — HOSPITAL ENCOUNTER (EMERGENCY)
Facility: HOSPITAL | Age: 75
Discharge: HOME | End: 2025-03-26
Attending: FAMILY MEDICINE
Payer: MEDICARE

## 2025-03-26 ENCOUNTER — APPOINTMENT (OUTPATIENT)
Dept: GENERAL RADIOLOGY | Facility: HOSPITAL | Age: 75
End: 2025-03-26
Attending: NURSE PRACTITIONER
Payer: MEDICARE

## 2025-03-26 VITALS
TEMPERATURE: 97.3 F | OXYGEN SATURATION: 95 % | SYSTOLIC BLOOD PRESSURE: 115 MMHG | BODY MASS INDEX: 30.7 KG/M2 | WEIGHT: 173.28 LBS | RESPIRATION RATE: 16 BRPM | HEART RATE: 59 BPM | DIASTOLIC BLOOD PRESSURE: 73 MMHG

## 2025-03-26 DIAGNOSIS — C90.00 MULTIPLE MYELOMA, REMISSION STATUS UNSPECIFIED (H): ICD-10-CM

## 2025-03-26 DIAGNOSIS — R68.84 JAW PAIN: ICD-10-CM

## 2025-03-26 LAB
ALBUMIN SERPL BCG-MCNC: 4.1 G/DL (ref 3.5–5.2)
ALP SERPL-CCNC: 123 U/L (ref 40–150)
ALT SERPL W P-5'-P-CCNC: 23 U/L (ref 0–50)
ANION GAP SERPL CALCULATED.3IONS-SCNC: 15 MMOL/L (ref 7–15)
AST SERPL W P-5'-P-CCNC: 21 U/L (ref 0–45)
ATRIAL RATE - MUSE: 78 BPM
BILIRUB SERPL-MCNC: 0.3 MG/DL
BUN SERPL-MCNC: 15.1 MG/DL (ref 8–23)
CALCIUM SERPL-MCNC: 9.4 MG/DL (ref 8.8–10.4)
CHLORIDE SERPL-SCNC: 101 MMOL/L (ref 98–107)
CREAT SERPL-MCNC: 0.92 MG/DL (ref 0.51–0.95)
DIASTOLIC BLOOD PRESSURE - MUSE: NORMAL MMHG
EGFRCR SERPLBLD CKD-EPI 2021: 64 ML/MIN/1.73M2
ERYTHROCYTE [DISTWIDTH] IN BLOOD BY AUTOMATED COUNT: 16 % (ref 10–15)
GLUCOSE SERPL-MCNC: 159 MG/DL (ref 70–99)
HCO3 SERPL-SCNC: 23 MMOL/L (ref 22–29)
HCT VFR BLD AUTO: 40.2 % (ref 35–47)
HGB BLD-MCNC: 13.5 G/DL (ref 11.7–15.7)
HOLD SPECIMEN: NORMAL
INTERPRETATION ECG - MUSE: NORMAL
MAGNESIUM SERPL-MCNC: 2 MG/DL (ref 1.7–2.3)
MCH RBC QN AUTO: 30.6 PG (ref 26.5–33)
MCHC RBC AUTO-ENTMCNC: 33.6 G/DL (ref 31.5–36.5)
MCV RBC AUTO: 91 FL (ref 78–100)
P AXIS - MUSE: 50 DEGREES
PLATELET # BLD AUTO: 226 10E3/UL (ref 150–450)
POTASSIUM SERPL-SCNC: 3.7 MMOL/L (ref 3.4–5.3)
PR INTERVAL - MUSE: 188 MS
PROT SERPL-MCNC: 6.6 G/DL (ref 6.4–8.3)
QRS DURATION - MUSE: 130 MS
QT - MUSE: 416 MS
QTC - MUSE: 474 MS
R AXIS - MUSE: -10 DEGREES
RBC # BLD AUTO: 4.41 10E6/UL (ref 3.8–5.2)
SODIUM SERPL-SCNC: 139 MMOL/L (ref 135–145)
SYSTOLIC BLOOD PRESSURE - MUSE: NORMAL MMHG
T AXIS - MUSE: -20 DEGREES
TROPONIN T SERPL HS-MCNC: 8 NG/L
TROPONIN T SERPL HS-MCNC: 8 NG/L
VENTRICULAR RATE- MUSE: 78 BPM
WBC # BLD AUTO: 7.2 10E3/UL (ref 4–11)

## 2025-03-26 PROCEDURE — 99285 EMERGENCY DEPT VISIT HI MDM: CPT | Mod: 25 | Performed by: NURSE PRACTITIONER

## 2025-03-26 PROCEDURE — 93010 ELECTROCARDIOGRAM REPORT: CPT | Performed by: INTERNAL MEDICINE

## 2025-03-26 PROCEDURE — 83735 ASSAY OF MAGNESIUM: CPT | Performed by: NURSE PRACTITIONER

## 2025-03-26 PROCEDURE — 71046 X-RAY EXAM CHEST 2 VIEWS: CPT

## 2025-03-26 PROCEDURE — 99284 EMERGENCY DEPT VISIT MOD MDM: CPT | Performed by: NURSE PRACTITIONER

## 2025-03-26 PROCEDURE — 93005 ELECTROCARDIOGRAM TRACING: CPT | Performed by: NURSE PRACTITIONER

## 2025-03-26 PROCEDURE — 36415 COLL VENOUS BLD VENIPUNCTURE: CPT | Performed by: NURSE PRACTITIONER

## 2025-03-26 PROCEDURE — 36415 COLL VENOUS BLD VENIPUNCTURE: CPT | Performed by: STUDENT IN AN ORGANIZED HEALTH CARE EDUCATION/TRAINING PROGRAM

## 2025-03-26 PROCEDURE — 85014 HEMATOCRIT: CPT | Performed by: NURSE PRACTITIONER

## 2025-03-26 PROCEDURE — 80053 COMPREHEN METABOLIC PANEL: CPT | Performed by: NURSE PRACTITIONER

## 2025-03-26 PROCEDURE — 84484 ASSAY OF TROPONIN QUANT: CPT | Performed by: NURSE PRACTITIONER

## 2025-03-26 ASSESSMENT — ACTIVITIES OF DAILY LIVING (ADL)
ADLS_ACUITY_SCORE: 41

## 2025-03-26 ASSESSMENT — COLUMBIA-SUICIDE SEVERITY RATING SCALE - C-SSRS
1. IN THE PAST MONTH, HAVE YOU WISHED YOU WERE DEAD OR WISHED YOU COULD GO TO SLEEP AND NOT WAKE UP?: NO
6. HAVE YOU EVER DONE ANYTHING, STARTED TO DO ANYTHING, OR PREPARED TO DO ANYTHING TO END YOUR LIFE?: NO
2. HAVE YOU ACTUALLY HAD ANY THOUGHTS OF KILLING YOURSELF IN THE PAST MONTH?: NO

## 2025-03-26 NOTE — ED TRIAGE NOTES
Patient presents with complaints of midsternal chest pain that she states lasted for about 15 minutes. Nothing on arrival. She states 5/10 jaw pain after her EKG.     Triage Assessment (Adult)       Row Name 03/26/25 1844          Cardiac WDL    Cardiac WDL X;chest pain        Chest Pain Assessment    Chest Pain Location midsternal     Chest Pain Radiation back;jaw        Cognitive/Neuro/Behavioral WDL    Cognitive/Neuro/Behavioral WDL WDL

## 2025-03-27 NOTE — ED PROVIDER NOTES
History     Chief Complaint   Patient presents with    Chest Pain     HPI  Carmelita Watts is a 74 year old female who presents with complaints of midsternal chest pain that she states lasted for about 15 minutes. Nothing on arrival. She states 5/10 jaw pain at the time and resolved now.  No pain when I see patient.     She has history of multiple myeloma, hypertension, type 2 diabetes, status post cholecystectomy and appendectomy who presents with an episode of substernal chest pain rating radiating to her jaw.  It occurred at 6:40 PM and lasted 5 minutes.  She had just eaten a bowl of soup and light meal.  No history of GERD.  She said she did have this 1 other time in her jaw and it was multiple myeloma, but her doctor told her if she ever got the jaw.  Again to come in.  It resolved on its own after about 5 minutes.  She did not take any medication.  She has had no known history of cardiac disease.       reviewed external records  Oncology   3/10/25  Interval History:   Carlene notes that she is doing well. Notes that she has had some bone pain in her facial bones. Denies this as sinus pressure or pain. Only really feels it when she presses on face. This is similar to sternum.   PLAN  #3 Bone pain Continues to note sporadic bone pain in sternum, ribs, and now her facial bones. Primarily when she pushes on bone themselves. Her myeloma numbers are beautiful with no detectable monoclonal protein so I don't believe this is related to progressive disease. Upon review, it does appear that the Pomalyst can cause musculoskeletal pain, as can Zometa. For now, will continue to monitor. If mspike starts to climb, will likely want to proceed with imaging.      (See complete note)       Allergies:  Allergies   Allergen Reactions    Lisinopril Cough    Phenylephrine Hcl Other (See Comments)     **Entex  HEADACHE (SEVERE)    Phenylpropanolamine Other (See Comments)     **Entex  HEADACHE (SEVERE)    Pseudoephedrine Tannate Other  (See Comments)     **Entex  HEADACHE (SEVERE)    Levofloxacin Rash     **Levaquin    Moxifloxacin Hcl [Moxifloxacin Hcl In Nacl] Rash       Problem List:    Patient Active Problem List    Diagnosis Date Noted    Hypogammaglobulinemia 12/09/2024     Priority: Medium    Arthritis 11/27/2023     Priority: Medium    SOB (shortness of breath) 09/20/2021     Priority: Medium    Diabetes mellitus, type 2 (H) 01/18/2021     Priority: Medium    Multiple myeloma not having achieved remission (H) 06/24/2019     Priority: Medium    ACP (advance care planning) 10/26/2016     Priority: Medium     Advance Care Planning 10/26/2016: ACP Review of Chart / Resources Provided:  Reviewed chart for advance care plan.  Carmelita Watts has no plan or code status on file. Discussed available resources and provided with information. Confirmed code status reflects current choices pending further ACP discussions.  Confirmed/documented legally designated decision makers.  Added by Alison Landeros            Major depressive disorder, recurrent episode, mild 10/01/2015     Priority: Medium    Seasonal allergies 10/01/2015     Priority: Medium    Hypertension goal BP (blood pressure) < 140/80 07/05/2013     Priority: Medium    Mixed hyperlipidemia 07/05/2013     Priority: Medium    Advanced care planning/counseling discussion 07/09/2012     Priority: Medium        Past Medical History:    Past Medical History:   Diagnosis Date    Arthritis     Cyst of breast     Depressive disorder     Diabetes mellitus, type 2 (H) 01/18/2021    Essential hypertension 10/01/2015    Major depressive disorder, recurrent episode, mild 10/01/2015    Mixed hyperlipidemia 7/5/2013    Multiple myeloma not having achieved remission (H) 06/24/2019    Other specified disorders of bladder 07/09/2012    Seasonal allergies 10/01/2015    Unspecified essential hypertension 03/19/2007    Unspecified sinusitis (chronic) 09/05/2007       Past Surgical History:    Past Surgical  "History:   Procedure Laterality Date    APPENDECTOMY      BONE MARROW BIOPSY, BONE SPECIMEN, NEEDLE/TROCAR N/A 06/18/2019    Procedure: BONE MARROW BIOPSY;  Surgeon: Maciej Sazn MD;  Location: [facility]    CHOLECYSTECTOMY      COLONOSCOPY  07/18/2006    repeat 10 years    COLONOSCOPY N/A 12/30/2016    Procedure: COLONOSCOPY;  Surgeon: Bhaskar Franklin DO;  Location: [facility] OR    PUNC/ASPIR BREAST CYST Left 1995    benign    SINUS SURGERY      TUBAL STERILIZATION         Family History:    Family History   Problem Relation Age of Onset    Breast Cancer Mother 60        Cause of Death; 1 breast    Parkinsonism Father         \"Possible\"    Coronary Artery Disease Father     Pacemaker Father     Thyroid Disease Daughter     Breast Cancer Maternal Aunt         over 50    Diabetes No family hx of     Hypertension No family hx of     Hyperlipidemia No family hx of     Cerebrovascular Disease No family hx of     Colon Cancer No family hx of     Prostate Cancer No family hx of     Genetic Disorder No family hx of     Asthma No family hx of     Anesthesia Reaction No family hx of        Social History:  Marital Status:   [5]  Social History     Tobacco Use    Smoking status: Never    Smokeless tobacco: Never    Tobacco comments:     Tried to Quit (YES); QUIT in 1971; Passive Exposure (NO)   Vaping Use    Vaping status: Never Used   Substance Use Topics    Alcohol use: Yes     Comment: RARELY    Drug use: No        Medications:    acyclovir (ZOVIRAX) 400 MG tablet  atorvastatin (LIPITOR) 20 MG tablet  aspirin (ASA) 81 MG chewable tablet  cetirizine (ZYRTEC) 10 MG tablet  dexAMETHasone (DECADRON) 4 MG tablet  diphenhydrAMINE (BENADRYL) 25 MG capsule  fluticasone (FLONASE) 50 MCG/ACT nasal spray  hydrocortisone (CORTAID) 1 % external cream  L-Lysine HCl 500 MG CAPS  losartan (COZAAR) 50 MG tablet  pomalidomide (POMALYST) 1 MG capsule  prochlorperazine (COMPAZINE) 10 MG tablet  sertraline (ZOLOFT) 50 MG " tablet          Review of Systems  See HPI; otherwise review of systems negative    Physical Exam   BP: (!) 144/81  Pulse: 77  Temp: 97.3  F (36.3  C)  Resp: 18  Weight: 78.6 kg (173 lb 4.5 oz)  SpO2: 99 %      Physical Exam    GENERAL APPEARANCE: no acute distress  EYES: PERRL, conjunctiva clear  HEENT:  Atraumatic, normocephalic. MMM  NECK: supple, no adenopathy  RESP: lungs are clear to auscultation   Chest wall:  sternum tenderness  CV: regular rate and rhythm, no calf swelling or tenderness  ABDOMEN:  soft, nontender, not distended, no rebound or guarding  NEURO: A &O, no focal deficit  MS: no muscular asymmetry   Psych: normal affect   SKIN: no suspicious lesions or rashes on visible skin      ED Course     ED Course as of 03/26/25 2126   Wed Mar 26, 2025   1853 EKG 12-lead, tracing only  ECG shows anterior ST abnormalities and inferior T wave abnormalities.  No acute abnormalities noted.   1854 Labs and x-ray ordered.     Procedures                Results for orders placed or performed during the hospital encounter of 03/26/25 (from the past 24 hours)   EKG 12-lead, tracing only   Result Value Ref Range    Systolic Blood Pressure  mmHg    Diastolic Blood Pressure  mmHg    Ventricular Rate 78 BPM    Atrial Rate 78 BPM    SD Interval 188 ms    QRS Duration 130 ms     ms    QTc 474 ms    P Axis 50 degrees    R AXIS -10 degrees    T Axis -20 degrees    Interpretation ECG       Sinus rhythm  Right bundle branch block  T wave abnormality, consider inferolateral ischemia  Abnormal ECG  No previous ECGs available     [place] [facility]    Narrative    The following orders were created for panel order [place] [facility].  Procedure                               Abnormality         Status                     ---------                               -----------         ------                     Extra Blue Top Tube[[id number]]                             Final result               Extra Red Top Tube[[id number]]                               Final result               Extra Green Top (Lithiu...[[id number]]                      Final result               Extra Purple Top Tube[[id number]]                           Final result               Extra Green Top (Lithiu...[[id number]]                      Final result                 Please view results for these tests on the individual orders.   Extra Blue Top Tube   Result Value Ref Range    Hold Specimen JIC    Extra Red Top Tube   Result Value Ref Range    Hold Specimen JIC    Extra Green Top (Lithium Heparin) Tube   Result Value Ref Range    Hold Specimen JIC    Extra Purple Top Tube   Result Value Ref Range    Hold Specimen JIC    Extra Green Top (Lithium Heparin) ON ICE   Result Value Ref Range    Hold Specimen JIC    CBC with platelets   Result Value Ref Range    WBC Count 7.2 4.0 - 11.0 10e3/uL    RBC Count 4.41 3.80 - 5.20 10e6/uL    Hemoglobin 13.5 11.7 - 15.7 g/dL    Hematocrit 40.2 35.0 - 47.0 %    MCV 91 78 - 100 fL    MCH 30.6 26.5 - 33.0 pg    MCHC 33.6 31.5 - 36.5 g/dL    RDW 16.0 (H) 10.0 - 15.0 %    Platelet Count 226 150 - 450 10e3/uL   Comprehensive metabolic panel   Result Value Ref Range    Sodium 139 135 - 145 mmol/L    Potassium 3.7 3.4 - 5.3 mmol/L    Carbon Dioxide (CO2) 23 22 - 29 mmol/L    Anion Gap 15 7 - 15 mmol/L    Urea Nitrogen 15.1 8.0 - 23.0 mg/dL    Creatinine 0.92 0.51 - 0.95 mg/dL    GFR Estimate 64 >60 mL/min/1.73m2    Calcium 9.4 8.8 - 10.4 mg/dL    Chloride 101 98 - 107 mmol/L    Glucose 159 (H) 70 - 99 mg/dL    Alkaline Phosphatase 123 40 - 150 U/L    AST 21 0 - 45 U/L    ALT 23 0 - 50 U/L    Protein Total 6.6 6.4 - 8.3 g/dL    Albumin 4.1 3.5 - 5.2 g/dL    Bilirubin Total 0.3 <=1.2 mg/dL   Troponin T, High Sensitivity   Result Value Ref Range    Troponin T, High Sensitivity 8 <=14 ng/L   Magnesium   Result Value Ref Range    Magnesium 2.0 1.7 - 2.3 mg/dL   XR Chest 2 Views    Narrative    EXAM: XR CHEST 2 VIEWS  LOCATION: [facility]  DATE:  3/26/2025    INDICATION: Chest pain  COMPARISON: 8/24/2024      Impression    IMPRESSION: Heart size is normal. Tortuous thoracic aorta. Increasing left basilar atelectasis. Upper lobes are clear. No effusions or pneumothorax. Right upper quadrant clips compatible with cholecystectomy.   Troponin T, High Sensitivity   Result Value Ref Range    Troponin T, High Sensitivity 8 <=14 ng/L     *Note: Due to a large number of results and/or encounters for the requested time period, some results have not been displayed. A complete set of results can be found in Results Review.       Medications - No data to display    Assessments & Plan (with Medical Decision Making): 74-year-old female with history of multiple myeloma, hypertension, type 2 diabetes, status post cholecystectomy and appendectomy who presents with an episode of substernal chest pain rating radiating to her jaw.  It occurred at 6:40 PM and lasted 5 minutes.  She had just eaten a bowl of soup and light meal.  No history of GERD.  She said she did have this 1 other time in her jaw and it was multiple myeloma, but her doctor told her if she ever got the jaw.  Again to come in.  It resolved on its own after about 5 minutes.  She did not take any medication.  She has had no known history of cardiac disease.  On exam appears in no acute distress.  Sats 99% on room air.  Hemodynamically stable 140/80 pulse 77.  She does have substernal chest wall discomfort which she says is her multiple myeloma and her chest pain was in that area.  Her jaws nontender.  Heart is regular rate.  Lungs are clear.  Abdomen is benign.  No right upper quadrant or epigastric tenderness.  No calf swelling or tenderness.  Alert orientated no focal neurologic deficit.  Broad differential considered.  EKG was done normal sinus rhythm at a rate of 78.  No ST elevation unchanged from EKG in 2019.    I reviewed Oncology note as noted above in which they discussed bone pain in her face similar to  her sternum. See above.   She remains pain free. Labs were sent. Normal cbc, basic labs, lft, magnesium.  Trop HS x 2 neg, no evidence of acute coronary syndrome.   Discussed follow up with Oncology regarding MM and as note above, see if they want to do imaging of face/jaw.  Discussed return precautions if new or worsening symptoms to return to ER .        I have reviewed the nursing notes.    I have reviewed the findings, diagnosis, plan and need for follow up with the patient.        New Prescriptions    No medications on file       Final diagnoses:   Jaw pain   Multiple myeloma, remission status unspecified (H)       3/26/2025   [facility]       Vilma Morris MD  03/26/25 [id number]

## 2025-03-27 NOTE — DISCHARGE INSTRUCTIONS
Follow up with Oncology.    Tylenol if needed for pain     Return to ER if new or worsening symptoms.

## 2025-03-31 ENCOUNTER — LAB (OUTPATIENT)
Dept: LAB | Facility: OTHER | Age: 75
End: 2025-03-31
Payer: MEDICARE

## 2025-03-31 ENCOUNTER — TELEPHONE (OUTPATIENT)
Dept: ONCOLOGY | Facility: CLINIC | Age: 75
End: 2025-03-31
Payer: COMMERCIAL

## 2025-03-31 DIAGNOSIS — C90.00 MULTIPLE MYELOMA NOT HAVING ACHIEVED REMISSION (H): ICD-10-CM

## 2025-03-31 LAB
ALBUMIN SERPL BCG-MCNC: 3.9 G/DL (ref 3.5–5.2)
ALP SERPL-CCNC: 141 U/L (ref 40–150)
ALT SERPL W P-5'-P-CCNC: 16 U/L (ref 0–50)
ANION GAP SERPL CALCULATED.3IONS-SCNC: 12 MMOL/L (ref 7–15)
AST SERPL W P-5'-P-CCNC: 15 U/L (ref 0–45)
ATRIAL RATE - MUSE: 78 BPM
BASOPHILS # BLD AUTO: 0.1 10E3/UL (ref 0–0.2)
BASOPHILS NFR BLD AUTO: 2 %
BILIRUB SERPL-MCNC: 0.4 MG/DL
BUN SERPL-MCNC: 18.4 MG/DL (ref 8–23)
CALCIUM SERPL-MCNC: 9.6 MG/DL (ref 8.8–10.4)
CHLORIDE SERPL-SCNC: 103 MMOL/L (ref 98–107)
CREAT SERPL-MCNC: 1.2 MG/DL (ref 0.51–0.95)
DIASTOLIC BLOOD PRESSURE - MUSE: NORMAL MMHG
EGFRCR SERPLBLD CKD-EPI 2021: 47 ML/MIN/1.73M2
EOSINOPHIL # BLD AUTO: 0.4 10E3/UL (ref 0–0.7)
EOSINOPHIL NFR BLD AUTO: 8 %
ERYTHROCYTE [DISTWIDTH] IN BLOOD BY AUTOMATED COUNT: 15.9 % (ref 10–15)
GLUCOSE SERPL-MCNC: 108 MG/DL (ref 70–99)
HCO3 SERPL-SCNC: 24 MMOL/L (ref 22–29)
HCT VFR BLD AUTO: 39.9 % (ref 35–47)
HGB BLD-MCNC: 13.5 G/DL (ref 11.7–15.7)
IMM GRANULOCYTES # BLD: 0.1 10E3/UL
IMM GRANULOCYTES NFR BLD: 2 %
INTERPRETATION ECG - MUSE: NORMAL
LYMPHOCYTES # BLD AUTO: 2.1 10E3/UL (ref 0.8–5.3)
LYMPHOCYTES NFR BLD AUTO: 40 %
MCH RBC QN AUTO: 30.3 PG (ref 26.5–33)
MCHC RBC AUTO-ENTMCNC: 33.8 G/DL (ref 31.5–36.5)
MCV RBC AUTO: 90 FL (ref 78–100)
MONOCYTES # BLD AUTO: 0.8 10E3/UL (ref 0–1.3)
MONOCYTES NFR BLD AUTO: 16 %
NEUTROPHILS # BLD AUTO: 1.7 10E3/UL (ref 1.6–8.3)
NEUTROPHILS NFR BLD AUTO: 33 %
NRBC # BLD AUTO: 0 10E3/UL
NRBC BLD AUTO-RTO: 0 /100
P AXIS - MUSE: 50 DEGREES
PLATELET # BLD AUTO: 185 10E3/UL (ref 150–450)
POTASSIUM SERPL-SCNC: 4 MMOL/L (ref 3.4–5.3)
PR INTERVAL - MUSE: 188 MS
PROT SERPL-MCNC: 6.5 G/DL (ref 6.4–8.3)
QRS DURATION - MUSE: 130 MS
QT - MUSE: 416 MS
QTC - MUSE: 474 MS
R AXIS - MUSE: -10 DEGREES
RBC # BLD AUTO: 4.45 10E6/UL (ref 3.8–5.2)
SODIUM SERPL-SCNC: 139 MMOL/L (ref 135–145)
SYSTOLIC BLOOD PRESSURE - MUSE: NORMAL MMHG
T AXIS - MUSE: -20 DEGREES
TOTAL PROTEIN SERUM FOR ELP: 6.1 G/DL (ref 6.4–8.3)
VENTRICULAR RATE- MUSE: 78 BPM
WBC # BLD AUTO: 5.2 10E3/UL (ref 4–11)

## 2025-03-31 PROCEDURE — 85025 COMPLETE CBC W/AUTO DIFF WBC: CPT | Mod: ZL

## 2025-03-31 PROCEDURE — 85810 BLOOD VISCOSITY EXAMINATION: CPT | Mod: ZL

## 2025-03-31 PROCEDURE — 86334 IMMUNOFIX E-PHORESIS SERUM: CPT | Mod: ZL | Performed by: STUDENT IN AN ORGANIZED HEALTH CARE EDUCATION/TRAINING PROGRAM

## 2025-03-31 PROCEDURE — 83521 IG LIGHT CHAINS FREE EACH: CPT | Mod: ZL

## 2025-03-31 PROCEDURE — 84155 ASSAY OF PROTEIN SERUM: CPT | Mod: ZL

## 2025-03-31 PROCEDURE — 82435 ASSAY OF BLOOD CHLORIDE: CPT | Mod: ZL

## 2025-03-31 PROCEDURE — 80053 COMPREHEN METABOLIC PANEL: CPT | Mod: ZL

## 2025-03-31 PROCEDURE — 82784 ASSAY IGA/IGD/IGG/IGM EACH: CPT | Mod: ZL

## 2025-03-31 PROCEDURE — 82232 ASSAY OF BETA-2 PROTEIN: CPT | Mod: ZL

## 2025-03-31 PROCEDURE — 84165 PROTEIN E-PHORESIS SERUM: CPT | Mod: ZL | Performed by: PATHOLOGY

## 2025-03-31 PROCEDURE — 36415 COLL VENOUS BLD VENIPUNCTURE: CPT | Mod: ZL

## 2025-03-31 NOTE — TELEPHONE ENCOUNTER
Oral Chemotherapy Monitoring Program     Medication: Pomalyst  Rx: 1mg PO daily on days 1 through 21 of 28 day cycle   REMS AUTH [id number]  Routine survey questions reviewed  Rx to be Escribed to [facility]

## 2025-04-01 LAB
ALBUMIN SERPL ELPH-MCNC: 3.8 G/DL (ref 3.7–5.1)
ALPHA1 GLOB SERPL ELPH-MCNC: 0.3 G/DL (ref 0.2–0.4)
ALPHA2 GLOB SERPL ELPH-MCNC: 0.8 G/DL (ref 0.5–0.9)
B-GLOBULIN SERPL ELPH-MCNC: 0.7 G/DL (ref 0.6–1)
B2 MICROGLOB TUMOR MARKER SER-MCNC: 2.1 MG/L
GAMMA GLOB SERPL ELPH-MCNC: 0.5 G/DL (ref 0.7–1.6)
IGA SERPL-MCNC: 30 MG/DL (ref 84–499)
IGG SERPL-MCNC: 444 MG/DL (ref 610–1616)
IGM SERPL-MCNC: 28 MG/DL (ref 35–242)
KAPPA LC FREE SER-MCNC: 0.62 MG/DL (ref 0.33–1.94)
KAPPA LC FREE/LAMBDA FREE SER NEPH: 1.48 {RATIO} (ref 0.26–1.65)
LAMBDA LC FREE SERPL-MCNC: 0.42 MG/DL (ref 0.57–2.63)
LOCATION OF TASK: ABNORMAL
LOCATION OF TASK: NORMAL
M PROTEIN SERPL ELPH-MCNC: 0 G/DL
PROT PATTERN SERPL ELPH-IMP: ABNORMAL
PROT PATTERN SERPL IFE-IMP: NORMAL
VISC SER: 1.6 CP (ref 1.4–1.8)

## 2025-04-02 ENCOUNTER — DOCUMENTATION ONLY (OUTPATIENT)
Dept: PHARMACY | Facility: CLINIC | Age: 75
End: 2025-04-02
Payer: COMMERCIAL

## 2025-04-02 DIAGNOSIS — H91.93 DECREASED HEARING OF BOTH EARS: Primary | ICD-10-CM

## 2025-04-02 NOTE — PROGRESS NOTES
Pharmacist IVIG Stewardship Program    Diagnosis: Hypogammaglobulinemia   Date  12/2/2024   IgG Level (mg/dL) 375     Current Dosing Regimen: Privigen 20g IV every 4 weeks PRN for IgG <600 mg/dL   Patient is dosed as needed based on an IgG level of less than 600 mg/dL to ensure the lowest amount of medication is administered to achieve beneficial outcomes.  Pre-medications:   Acetaminophen 650 mg by mouth, 30 minutes prior to infusion  Diphenhydramine 50 mg by mouth, 30 minutes prior to infusion  Hydrocortisone 100 mg IV PRN for previous reactions to IVIG (per chart review patient has not had IVIG before)  Current Titration Regimen: Start infusion at 0.5 ml/kg/hr x 15 min. If tolerated, increase rate by 0.5 ml/kg/hr every 15 min to a maximum of 4 ml/kg/hr.  Previously Tried Products: None     Side Effects: Unable to reach patient to discuss.     Interventions: Lab review completed.     Assessment:   Date  3/31/2025   IgG Level (mg/dL) 444   Patient is appropriate for IVIG therapy when their level is within parameter. IVIG infusions are effective in increasing IgG levels to an appropriate level to minimize patient's risk of infection. Patient should continue on treatment as they have been per provider order. Without therapy their levels would put them at an increased risk of infections.    Plan: Patient is okay to proceed with infusion on 4/7/25 as her level is below 600 mg/dL. Patient will have additional labs drawn next month to assess the need for ongoing infusions. Patient should not have levels drawn on the same day as IVIG infusion.      Next Review Needed: 5/2025    Karen Menon, PharmD, IgCP  Medication Access Pharmacist

## 2025-04-04 NOTE — PROGRESS NOTES
Zometa auth noting . Secure TEAMs message to Elizabeth SUÁREZ in benefits asking for clarification re whether or not to proceed.

## 2025-04-05 DIAGNOSIS — C90.00 MULTIPLE MYELOMA NOT HAVING ACHIEVED REMISSION (H): ICD-10-CM

## 2025-04-07 ENCOUNTER — ONCOLOGY VISIT (OUTPATIENT)
Dept: ONCOLOGY | Facility: OTHER | Age: 75
End: 2025-04-07
Attending: INTERNAL MEDICINE
Payer: MEDICARE

## 2025-04-07 ENCOUNTER — TELEPHONE (OUTPATIENT)
Dept: MAMMOGRAPHY | Facility: OTHER | Age: 75
End: 2025-04-07

## 2025-04-07 ENCOUNTER — TELEPHONE (OUTPATIENT)
Dept: ONCOLOGY | Facility: OTHER | Age: 75
End: 2025-04-07

## 2025-04-07 ENCOUNTER — INFUSION THERAPY VISIT (OUTPATIENT)
Dept: INFUSION THERAPY | Facility: OTHER | Age: 75
End: 2025-04-07
Attending: INTERNAL MEDICINE
Payer: MEDICARE

## 2025-04-07 ENCOUNTER — LAB (OUTPATIENT)
Dept: LAB | Facility: OTHER | Age: 75
End: 2025-04-07
Attending: INTERNAL MEDICINE
Payer: MEDICARE

## 2025-04-07 ENCOUNTER — ANCILLARY PROCEDURE (OUTPATIENT)
Dept: MAMMOGRAPHY | Facility: OTHER | Age: 75
End: 2025-04-07
Attending: FAMILY MEDICINE
Payer: MEDICARE

## 2025-04-07 VITALS
BODY MASS INDEX: 28.71 KG/M2 | TEMPERATURE: 97.3 F | HEART RATE: 100 BPM | HEIGHT: 63 IN | SYSTOLIC BLOOD PRESSURE: 118 MMHG | DIASTOLIC BLOOD PRESSURE: 60 MMHG | OXYGEN SATURATION: 97 % | WEIGHT: 162.04 LBS

## 2025-04-07 DIAGNOSIS — C90.00 MULTIPLE MYELOMA NOT HAVING ACHIEVED REMISSION (H): ICD-10-CM

## 2025-04-07 DIAGNOSIS — R19.7 DIARRHEA, UNSPECIFIED TYPE: ICD-10-CM

## 2025-04-07 DIAGNOSIS — Z12.31 VISIT FOR SCREENING MAMMOGRAM: ICD-10-CM

## 2025-04-07 DIAGNOSIS — C90.00 MULTIPLE MYELOMA NOT HAVING ACHIEVED REMISSION (H): Primary | ICD-10-CM

## 2025-04-07 DIAGNOSIS — R19.7 DIARRHEA, UNSPECIFIED TYPE: Primary | ICD-10-CM

## 2025-04-07 DIAGNOSIS — E87.6 HYPOKALEMIA: ICD-10-CM

## 2025-04-07 LAB
ALBUMIN SERPL BCG-MCNC: 3.5 G/DL (ref 3.5–5.2)
ALBUMIN SERPL BCG-MCNC: 4.2 G/DL (ref 3.5–5.2)
ALP SERPL-CCNC: 102 U/L (ref 40–150)
ALP SERPL-CCNC: 122 U/L (ref 40–150)
ALT SERPL W P-5'-P-CCNC: 13 U/L (ref 0–50)
ALT SERPL W P-5'-P-CCNC: 19 U/L (ref 0–50)
ANION GAP SERPL CALCULATED.3IONS-SCNC: 11 MMOL/L (ref 7–15)
ANION GAP SERPL CALCULATED.3IONS-SCNC: 18 MMOL/L (ref 7–15)
AST SERPL W P-5'-P-CCNC: 19 U/L (ref 0–45)
AST SERPL W P-5'-P-CCNC: 20 U/L (ref 0–45)
BASOPHILS # BLD AUTO: 0.1 10E3/UL (ref 0–0.2)
BASOPHILS NFR BLD AUTO: 1 %
BILIRUB SERPL-MCNC: 0.4 MG/DL
BILIRUB SERPL-MCNC: 0.5 MG/DL
BUN SERPL-MCNC: 48.9 MG/DL (ref 8–23)
BUN SERPL-MCNC: 54.2 MG/DL (ref 8–23)
CALCIUM SERPL-MCNC: 7.5 MG/DL (ref 8.8–10.4)
CALCIUM SERPL-MCNC: 9.4 MG/DL (ref 8.8–10.4)
CHLORIDE SERPL-SCNC: 103 MMOL/L (ref 98–107)
CHLORIDE SERPL-SCNC: 97 MMOL/L (ref 98–107)
CREAT SERPL-MCNC: 2.04 MG/DL (ref 0.51–0.95)
CREAT SERPL-MCNC: 2.99 MG/DL (ref 0.51–0.95)
EGFRCR SERPLBLD CKD-EPI 2021: 16 ML/MIN/1.73M2
EGFRCR SERPLBLD CKD-EPI 2021: 25 ML/MIN/1.73M2
EOSINOPHIL # BLD AUTO: 0.1 10E3/UL (ref 0–0.7)
EOSINOPHIL NFR BLD AUTO: 1 %
ERYTHROCYTE [DISTWIDTH] IN BLOOD BY AUTOMATED COUNT: 15.3 % (ref 10–15)
GLUCOSE SERPL-MCNC: 116 MG/DL (ref 70–99)
GLUCOSE SERPL-MCNC: 180 MG/DL (ref 70–99)
HCO3 SERPL-SCNC: 15 MMOL/L (ref 22–29)
HCO3 SERPL-SCNC: 15 MMOL/L (ref 22–29)
HCT VFR BLD AUTO: 43.3 % (ref 35–47)
HGB BLD-MCNC: 15.5 G/DL (ref 11.7–15.7)
IMM GRANULOCYTES # BLD: 0.1 10E3/UL
IMM GRANULOCYTES NFR BLD: 1 %
LYMPHOCYTES # BLD AUTO: 1.6 10E3/UL (ref 0.8–5.3)
LYMPHOCYTES NFR BLD AUTO: 18 %
MAGNESIUM SERPL-MCNC: 2.1 MG/DL (ref 1.7–2.3)
MCH RBC QN AUTO: 29.7 PG (ref 26.5–33)
MCHC RBC AUTO-ENTMCNC: 35.8 G/DL (ref 31.5–36.5)
MCV RBC AUTO: 83 FL (ref 78–100)
MONOCYTES # BLD AUTO: 1.8 10E3/UL (ref 0–1.3)
MONOCYTES NFR BLD AUTO: 21 %
NEUTROPHILS # BLD AUTO: 5.1 10E3/UL (ref 1.6–8.3)
NEUTROPHILS NFR BLD AUTO: 58 %
NRBC # BLD AUTO: 0 10E3/UL
NRBC BLD AUTO-RTO: 0 /100
PLAT MORPH BLD: NORMAL
PLATELET # BLD AUTO: 307 10E3/UL (ref 150–450)
POTASSIUM SERPL-SCNC: 2.5 MMOL/L (ref 3.4–5.3)
POTASSIUM SERPL-SCNC: 2.7 MMOL/L (ref 3.4–5.3)
PROT SERPL-MCNC: 5.9 G/DL (ref 6.4–8.3)
PROT SERPL-MCNC: 7.4 G/DL (ref 6.4–8.3)
RBC # BLD AUTO: 5.22 10E6/UL (ref 3.8–5.2)
RBC MORPH BLD: NORMAL
SODIUM SERPL-SCNC: 129 MMOL/L (ref 135–145)
SODIUM SERPL-SCNC: 130 MMOL/L (ref 135–145)
WBC # BLD AUTO: 8.8 10E3/UL (ref 4–11)

## 2025-04-07 PROCEDURE — 250N000011 HC RX IP 250 OP 636: Mod: JZ | Performed by: NURSE PRACTITIONER

## 2025-04-07 PROCEDURE — 83735 ASSAY OF MAGNESIUM: CPT | Mod: ZL

## 2025-04-07 PROCEDURE — 85004 AUTOMATED DIFF WBC COUNT: CPT | Mod: ZL

## 2025-04-07 PROCEDURE — G0463 HOSPITAL OUTPT CLINIC VISIT: HCPCS

## 2025-04-07 PROCEDURE — 85041 AUTOMATED RBC COUNT: CPT | Mod: ZL

## 2025-04-07 PROCEDURE — 84460 ALANINE AMINO (ALT) (SGPT): CPT | Mod: ZL

## 2025-04-07 PROCEDURE — 36415 COLL VENOUS BLD VENIPUNCTURE: CPT | Mod: ZL

## 2025-04-07 PROCEDURE — 84155 ASSAY OF PROTEIN SERUM: CPT | Mod: ZL

## 2025-04-07 PROCEDURE — 77067 SCR MAMMO BI INCL CAD: CPT | Mod: 26 | Performed by: RADIOLOGY

## 2025-04-07 PROCEDURE — 36415 COLL VENOUS BLD VENIPUNCTURE: CPT

## 2025-04-07 PROCEDURE — 84132 ASSAY OF SERUM POTASSIUM: CPT | Mod: ZL

## 2025-04-07 PROCEDURE — 77063 BREAST TOMOSYNTHESIS BI: CPT | Mod: TC

## 2025-04-07 PROCEDURE — 258N000003 HC RX IP 258 OP 636: Performed by: NURSE PRACTITIONER

## 2025-04-07 PROCEDURE — 77063 BREAST TOMOSYNTHESIS BI: CPT | Mod: 26 | Performed by: RADIOLOGY

## 2025-04-07 RX ORDER — HEPARIN SODIUM,PORCINE 10 UNIT/ML
5-20 VIAL (ML) INTRAVENOUS DAILY PRN
Status: CANCELLED | OUTPATIENT
Start: 2025-04-07

## 2025-04-07 RX ORDER — HEPARIN SODIUM (PORCINE) LOCK FLUSH IV SOLN 100 UNIT/ML 100 UNIT/ML
5 SOLUTION INTRAVENOUS
Status: CANCELLED | OUTPATIENT
Start: 2025-04-07

## 2025-04-07 RX ORDER — POTASSIUM CHLORIDE 1500 MG/1
20 TABLET, EXTENDED RELEASE ORAL 2 TIMES DAILY
Qty: 60 TABLET | Refills: 1 | Status: SHIPPED | OUTPATIENT
Start: 2025-04-07

## 2025-04-07 RX ORDER — LOPERAMIDE HYDROCHLORIDE 2 MG/1
2 CAPSULE ORAL 4 TIMES DAILY PRN
COMMUNITY

## 2025-04-07 RX ORDER — POTASSIUM CHLORIDE 1500 MG/1
40 TABLET, EXTENDED RELEASE ORAL ONCE
Status: COMPLETED | OUTPATIENT
Start: 2025-04-07 | End: 2025-04-07

## 2025-04-07 RX ORDER — DEXAMETHASONE 4 MG/1
TABLET ORAL
Qty: 15 TABLET | Refills: 0 | Status: SHIPPED | OUTPATIENT
Start: 2025-04-07

## 2025-04-07 RX ADMIN — SODIUM CHLORIDE 1000 ML: 9 INJECTION, SOLUTION INTRAVENOUS at 10:02

## 2025-04-07 RX ADMIN — POTASSIUM CHLORIDE 40 MEQ: 1500 TABLET, EXTENDED RELEASE ORAL at 10:58

## 2025-04-07 RX ADMIN — POTASSIUM CHLORIDE 30 MEQ: 2 INJECTION, SOLUTION, CONCENTRATE INTRAVENOUS at 11:07

## 2025-04-07 ASSESSMENT — PAIN SCALES - GENERAL: PAINLEVEL_OUTOF10: NO PAIN (0)

## 2025-04-07 NOTE — TELEPHONE ENCOUNTER
DATE/TIME OF CALL RECEIVED FROM LAB:  04/07/25 at 9:27AM   LAB TEST:  Potassium  LAB VALUE:  2.5  PROVIDER NOTIFIED?: Yes  PROVIDER NAME: Nida Gonzalez NP  DATE/TIME LAB VALUE REPORTED TO PROVIDER: 4/7/2025 9:30 am  MECHANISM OF PROVIDER NOTIFICATION: Face-To-Face  PROVIDER RESPONSE: Patient in clinic seeing Provider Nida Chi LPN

## 2025-04-07 NOTE — PROGRESS NOTES
Oncology Follow-up Visit    Reason for Visit:  Carmelita is a 74 year old woman with a diagnosis of multiple myeloma, who I am visiting with today for routine follow-up and consideration of ongoing therapy.     Nursing Note and documentation reviewed: Yes    Interval History:   Carlene has not been doing well. Since last Monday, she became quite ill with vomiting and diarrhea. Initially she thought it was maybe food poisoning but this persisted for 6 days. Just yesterday she started to feel a little better but not 100% yet. She was wearing depends. 20+ Bms one day with vomiting as well. She was very chilled Monday night but didn't check temp. No breathing concerns. No bleeding concerns. Hasn't been eating much at all. She denies abdominal pain. Energy is low, and she feels weak. She was never evaluated for this.     Oncologic History  12/31/2018 Presented to the emergency room with vertigo and fatigue.  CT scan of the head was negative and subsequent stress test was negative.  05/03/2019 PCP ordered lab work and noted a total protein of 12.9.  SPEP at that time showed an M spike of 6.2 with a large monoclonal protein seen in the gamma fraction. Urine immunofixation showed a possible small protein band in the gamma fraction  05/31/2019 Evaluated by Dr. Walker with Medical Oncology with plan to rule out myeloma and obtain bone marrow aspiration biopsy as well as a metastatic bone survey along with additional labwork  06/18/2019 Underwent bone marrow aspiration and biopsy  06/24/2019 Seen again by Dr. Walker and CBC showed a hemoglobin of 9.3, M spike 7.3 with monoclonal IgG immunoglobulin of kappa light chain type; serum viscosity was 2.9; quantitative immunoglobulins showed an IgG of 8160, beta-2 microglobulin was 5.8, BUN was 21 with creatinine is 0.8 and total protein was 13.7.  Quantitative kappa/lambda free light chains showed an elevated ratio of 17.0 bone marrow aspiration biopsy showed plasma cell myeloma with  approximately 80% plasma cells.  Immunofixation showed IgG kappa and flow cytometry revealed kappa monotypic plasma cells consistent with clonal plasma cell neoplasm and FISH panel was pending at that time. It was felt she had at least stage II disease based on her beta-2 microglobulin and anemia. Plan was to treat with Velcade 1.3 mg per/m2 days 1, 4, 8 and 11/Decadron 40 mg on days 1, 8 and 15. Initiation of Revlimid with C2 at 25 mg daily days 1 through 14.  Plan was to also obtain an MRI of the lumbar spine to rule out lytic lesion at L3.  She was initiated on Zovirax 400 mg p.o. twice daily.  06/25/2019 C1 of chemotherapy initiated  07/01/2019 Note in chart regarding patient's large co-pay for the Revlimid and no plan at this point to initiate Revlimid and treat only with Velcade and Decadron per Dr. Walker  07/11/2019 MRI lumbar spine shows a pathologic superior endplate compression fracture at L3 without evidence of retropulsed fragment and innumerable enhancing lesions throughout the lumbar spine consistent with history of multiple myeloma.  09/10/2019 Increasing M-spike and kappa lambda ratio  10/01/2019 Initiation of CyBorD  11/17/2020 Plateau of M-spike at 1.2 after 36 cycles of CyBorD  12/01/2020 Initiation of Darzalex Faspro injection  03/23/2021 M-spike increased form 1.0 to 1.2 and velcade and dexamethasone added to plan  04/12/2022 Treatment HOLIDAY commenced with Mspike 0.3 after 22 cycles of treatent  05/27/2022 Received Evusheld while on treatment holiday.   07/18/2023 Mspike 0.8. Other labs stable. PET scan ordered.   08/02/2023 PET completed showing negative study. No evidence of mass or lymphadenopathy. No concerning hypermetabolism is present.  08/14/2023 Met with Dr. Walker. Given biochemical progression, he did feel appropriate to resume therapy again with Darzalex faspro  03/12/2023 Following C7, mspike magy from 0.2-->0.8. PET ordered which was negative. Again increased frequency of  "Darzalex with anticipation of adding in Pomalyst with C2.    04/29/2024 Addition of Pomalyst with C2  12/16/2024 IVIG added for suppressed IgG levels and increased infections    Current Chemo Regime/TX: Darzalex faspro injection 1800mg subcutaneous per protocol and Pomalyst 1 mg days 1-21 of 28 day cycle given with weekly Dex  Current Cycle: C14 (held today)  Completed cycles: 13  **Pom added C2 (4/29)     Previous treatment:     Velcade 1.3 mg/m2 days 1, 4, 8 and 11 with Decadron 40 mg days 1, 8 and 15 x 4 cycles;     Velcade 1.5mg.m2, cyclophosphamide 150mg every 7 days on days 1,8,15 and 22 with decadron 40mg days 1,8,15,22  Darzalex faspro injection 1800mg subcutaneous per protocol    Past Medical History:   Diagnosis Date    Arthritis     Cyst of breast     Depressive disorder     Diabetes mellitus, type 2 (H) 01/18/2021    Essential hypertension 10/01/2015    Major depressive disorder, recurrent episode, mild 10/01/2015    Mixed hyperlipidemia 7/5/2013    Multiple myeloma not having achieved remission (H) 06/24/2019    Other specified disorders of bladder 07/09/2012    Irritable Bladder    Seasonal allergies 10/01/2015    Unspecified essential hypertension 03/19/2007    Unspecified sinusitis (chronic) 09/05/2007       Past Surgical History:   Procedure Laterality Date    APPENDECTOMY      BONE MARROW BIOPSY, BONE SPECIMEN, NEEDLE/TROCAR N/A 06/18/2019    Procedure: BONE MARROW BIOPSY;  Surgeon: Maciej Sanz MD;  Location: [facility] OR    CHOLECYSTECTOMY      COLONOSCOPY  07/18/2006    repeat 10 years    COLONOSCOPY N/A 12/30/2016    Procedure: COLONOSCOPY;  Surgeon: Bhaskar Franklin DO;  Location: [facility] OR    PUNC/ASPIR BREAST CYST Left 1995    benign    SINUS SURGERY      TUBAL STERILIZATION         Family History   Problem Relation Age of Onset    Breast Cancer Mother 60        Cause of Death; 1 breast    Parkinsonism Father         \"Possible\"    Coronary Artery Disease Father     Pacemaker Father     Thyroid " Disease Daughter     Breast Cancer Maternal Aunt         over 50    Diabetes No family hx of     Hypertension No family hx of     Hyperlipidemia No family hx of     Cerebrovascular Disease No family hx of     Colon Cancer No family hx of     Prostate Cancer No family hx of     Genetic Disorder No family hx of     Asthma No family hx of     Anesthesia Reaction No family hx of        Social History     Socioeconomic History    Marital status:      Spouse name: Not on file    Number of children: Not on file    Years of education: Not on file    Highest education level: Not on file   Occupational History    Occupation: Financial     Comment:  - (FT)   Tobacco Use    Smoking status: Never    Smokeless tobacco: Never    Tobacco comments:     Tried to Quit (YES); QUIT in 1971; Passive Exposure (NO)   Vaping Use    Vaping status: Never Used   Substance and Sexual Activity    Alcohol use: Yes     Comment: RARELY    Drug use: No    Sexual activity: Yes     Partners: Male     Birth control/protection: None   Other Topics Concern     Service Not Asked    Blood Transfusions Not Asked    Caffeine Concern Yes     Comment: Coffee >6 cups daily    Occupational Exposure Not Asked    Hobby Hazards Not Asked    Sleep Concern Not Asked    Stress Concern Not Asked    Weight Concern Not Asked    Special Diet Not Asked    Back Care Not Asked    Exercise Not Asked    Bike Helmet Not Asked    Seat Belt Not Asked    Self-Exams Not Asked    Parent/sibling w/ CABG, MI or angioplasty before 65F 55M? No   Social History Narrative    Not on file     Social Drivers of Health     Financial Resource Strain: Low Risk  (1/7/2025)    Financial Resource Strain     Within the past 12 months, have you or your family members you live with been unable to get utilities (heat, electricity) when it was really needed?: No   Food Insecurity: Low Risk  (1/7/2025)    Food Insecurity     Within the past 12 months, did you worry that  your food would run out before you got money to buy more?: No     Within the past 12 months, did the food you bought just not last and you didn t have money to get more?: No   Transportation Needs: Low Risk  (1/7/2025)    Transportation Needs     Within the past 12 months, has lack of transportation kept you from medical appointments, getting your medicines, non-medical meetings or appointments, work, or from getting things that you need?: No   Physical Activity: Not on file   Stress: Not on file   Social Connections: Not on file   Interpersonal Safety: Low Risk  (4/7/2025)    Interpersonal Safety     Do you feel physically and emotionally safe where you currently live?: Yes     Within the past 12 months, have you been hit, slapped, kicked or otherwise physically hurt by someone?: No     Within the past 12 months, have you been humiliated or emotionally abused in other ways by your partner or ex-partner?: No   Housing Stability: Low Risk  (1/7/2025)    Housing Stability     Do you have housing? : Yes     Are you worried about losing your housing?: No       Current Outpatient Medications   Medication Sig Dispense Refill    acyclovir (ZOVIRAX) 400 MG tablet Take 1 tablet (400 mg) by mouth 2 times daily Start 1 week prior to daratumumab and continue for 3 months after daratumumab treatment complete. 60 tablet 11    aspirin (ASA) 81 MG chewable tablet Take 81 mg by mouth daily      atorvastatin (LIPITOR) 20 MG tablet TAKE 1 TABLET BY MOUTH DAILY 90 tablet 3    cetirizine (ZYRTEC) 10 MG tablet Take 1 tablet (10 mg) by mouth daily 30 tablet 1    fluticasone (FLONASE) 50 MCG/ACT nasal spray SPRAY 2 SPRAYS INTO BOTH NOSTRILS DAILY 48 mL 0    hydrocortisone (CORTAID) 1 % external cream APPLY SPARINGLY OVER RASH TO FACE AND CHEST TOPICALLY TWICE DAILY 45 g 0    loperamide (IMODIUM) 2 MG capsule Take 2 mg by mouth 4 times daily as needed for diarrhea.      losartan (COZAAR) 50 MG tablet TAKE 1 TABLET BY MOUTH DAILY 90 tablet 3     pomalidomide (POMALYST) 1 MG capsule Take 1 capsule (1 mg) by mouth daily for 21 days Day 1 thru 21 of each 28 day cycle. Swallow whole with water. 21 capsule 0    sertraline (ZOLOFT) 50 MG tablet TAKE 1/2 TABLET BY MOUTH DAILY 45 tablet 3    dexAMETHasone (DECADRON) 4 MG tablet Take 5 tablets (20 mg) on Days 8, 15, and 22. 15 tablet 0    diphenhydrAMINE (BENADRYL) 25 MG capsule Take 25 mg by mouth daily (Patient not taking: Reported on 4/7/2025)      L-Lysine HCl 500 MG CAPS Take by mouth daily. (Patient not taking: Reported on 1/7/2025)      prochlorperazine (COMPAZINE) 10 MG tablet Take 1 tablet (10 mg) by mouth every 6 hours as needed for nausea or vomiting (Patient not taking: Reported on 4/7/2025) 30 tablet 2     No current facility-administered medications for this visit.     Facility-Administered Medications Ordered in Other Visits   Medication Dose Route Frequency Provider Last Rate Last Admin    potassium chloride 30 mEq in sodium chloride 0.9 % 250 mL intermittent infusion  30 mEq Intravenous Once Nida Gonzalez APRN CNP 83.3 mL/hr at 04/07/25 1107 30 mEq at 04/07/25 1107    sodium chloride (PF) 0.9% PF flush 3-20 mL  3-20 mL Intracatheter q1 min prn Nida Gonzalez APRN CNP        sodium chloride 0.9% BOLUS 1,000 mL  1,000 mL Intravenous Once Nida Gonzalez APRN  mL/hr at 04/07/25 1002 1,000 mL at 04/07/25 1002        Allergies   Allergen Reactions    Lisinopril Cough    Phenylephrine Hcl Other (See Comments)     **Entex  HEADACHE (SEVERE)    Phenylpropanolamine Other (See Comments)     **Entex  HEADACHE (SEVERE)    Pseudoephedrine Tannate Other (See Comments)     **Entex  HEADACHE (SEVERE)    Levofloxacin Rash     **Levaquin    Moxifloxacin Hcl [Moxifloxacin Hcl In Nacl] Rash       Review Of Systems:  A complete review of systems is negative except for the above mentioned items in the interval history.     ECOG PS: 0    Physical Exam:  /60 (BP Location: Right  "arm, Patient Position: Sitting, Cuff Size: Adult Regular)   Pulse 100   Temp 97.3  F (36.3  C) (Tympanic)   Ht 1.6 m (5' 3\")   Wt 73.5 kg (162 lb 0.6 oz)   SpO2 97%   BMI 28.70 kg/m    GENERAL APPEARANCE: Alert, and not acute distress but looks fatigued.   HEENT: Eyes appear normal without scleral icterus. Extraocular movements intact.   RESP: Lungs clear to auscultation bilaterally, respirations regular and easy.  CARDIOVASCULAR: Regular rate and rhythm. Normal S1, S2; no murmur, gallop, or rub.  ABDOMEN: Soft, non-tender, non-distended. No palpable organomegaly or masses.  MUSCULOSKELETAL: Extremities without gross deformities noted. Weak, using wheelchair for distances.   SKIN: No suspicious lesions or rashes.  NEURO: Alert and oriented x 3.   PSYCHIATRIC: Mentation and affect appear normal.  Mood appropriate, but tired.    Laboratory:  Component      Latest Ref [facility] 3/31/2025  10:10 AM   WBC      4.0 - 11.0 10e3/uL 5.2    RBC Count      3.80 - 5.20 10e6/uL 4.45    Hemoglobin      11.7 - 15.7 g/dL 13.5    Hematocrit      35.0 - 47.0 % 39.9    MCV      78 - 100 fL 90    MCH      26.5 - 33.0 pg 30.3    MCHC      31.5 - 36.5 g/dL 33.8    RDW      10.0 - 15.0 % 15.9 (H)    Platelet Count      150 - 450 10e3/uL 185    % Neutrophils      % 33    % Lymphocytes      % 40    % Monocytes      % 16    % Eosinophils      % 8    % Basophils      % 2    % Immature Granulocytes      % 2    NRBCs per 100 WBC      <1 /100 0    Absolute Neutrophils      1.6 - 8.3 10e3/uL 1.7    Absolute Lymphocytes      0.8 - 5.3 10e3/uL 2.1    Absolute Monocytes      0.0 - 1.3 10e3/uL 0.8    Absolute Eosinophils      0.0 - 0.7 10e3/uL 0.4    Absolute Basophils      0.0 - 0.2 10e3/uL 0.1    Absolute Immature Granulocytes      <=0.4 10e3/uL 0.1    Absolute NRBCs      10e3/uL 0.0    Sodium      135 - 145 mmol/L 139    Potassium      3.4 - 5.3 mmol/L 4.0    Carbon Dioxide (CO2)      22 - 29 mmol/L 24    Anion Gap      7 - 15 mmol/L 12    Urea " Nitrogen      8.0 - 23.0 mg/dL 18.4    Creatinine      0.51 - 0.95 mg/dL 1.20 (H)    GFR Estimate      >60 mL/min/1.73m2 47 (L)    Calcium      8.8 - 10.4 mg/dL 9.6    Chloride      98 - 107 mmol/L 103    Glucose      70 - 99 mg/dL 108 (H)    Alkaline Phosphatase      40 - 150 U/L 141    AST      0 - 45 U/L 15    ALT      0 - 50 U/L 16    Protein Total      6.4 - 8.3 g/dL 6.5    Albumin      3.5 - 5.2 g/dL 3.9    Bilirubin Total      <=1.2 mg/dL 0.4    Albumin Fraction      3.7 - 5.1 g/dL 3.8    Alpha 1 Fraction      0.2 - 0.4 g/dL 0.3    Alpha 2 Fraction      0.5 - 0.9 g/dL 0.8    Beta Fraction      0.6 - 1.0 g/dL 0.7    Gamma Fraction      0.7 - 1.6 g/dL 0.5 (L)    Monoclonal Peak      <=0.0 g/dL 0.0    ELP Interpretation: Hypogammaglobulinemia. No monoclonal protein seen, recommend quantitative immunoglobulins if clinically indicated. Recommend a urine for immunofixation to rule out a light chain secreting myeloma. Pathologic significance requires clinical correlation. Clair Lyons M.D., Ph.D.    Signout Location if Remote AB    Signout Location if Remote ABK1    IGG      610 - 1,616 mg/dL 444 (L)    IGA      84 - 499 mg/dL 30 (L)    IGM      35 - 242 mg/dL 28 (L)    [place] Free Lt Chain      0.33 - 1.94 mg/dL 0.62    Lambda Free Lt Chain      0.57 - 2.63 mg/dL 0.42 (L)    Kappa Lambda Ratio      0.26 - 1.65  1.48    Immunofixation ELP No monoclonal protein seen on immunofixation. Pathologic significance requires clinical correlation.  Clair Lyons M.D., Ph.D.    Beta-2-Microglobulin      <2.3 mg/L 2.1    Viscosity Index      1.4 - 1.8  1.6    Total Protein Serum for ELP      6.4 - 8.3 g/dL 6.1 (L)       Legend:  (H) High  (L) Low      Component      Latest Ref [facility] 4/7/2025  8:34 AM   WBC      4.0 - 11.0 10e3/uL 8.8    RBC Count      3.80 - 5.20 10e6/uL 5.22 (H)    Hemoglobin      11.7 - 15.7 g/dL 15.5    Hematocrit      35.0 - 47.0 % 43.3    MCV      78 - 100 fL 83    MCH      26.5 - 33.0 pg 29.7    MCHC       31.5 - 36.5 g/dL 35.8    RDW      10.0 - 15.0 % 15.3 (H)    Platelet Count      150 - 450 10e3/uL 307    % Neutrophils      % 58    % Lymphocytes      % 18    % Monocytes      % 21    % Eosinophils      % 1    % Basophils      % 1    % Immature Granulocytes      % 1    NRBCs per 100 WBC      <1 /100 0    Absolute Neutrophils      1.6 - 8.3 10e3/uL 5.1    Absolute Lymphocytes      0.8 - 5.3 10e3/uL 1.6    Absolute Monocytes      0.0 - 1.3 10e3/uL 1.8 (H)    Absolute Eosinophils      0.0 - 0.7 10e3/uL 0.1    Absolute Basophils      0.0 - 0.2 10e3/uL 0.1    Absolute Immature Granulocytes      <=0.4 10e3/uL 0.1    Absolute NRBCs      10e3/uL 0.0    Sodium      135 - 145 mmol/L 130 (L)    Potassium      3.4 - 5.3 mmol/L 2.5 (LL)    Carbon Dioxide (CO2)      22 - 29 mmol/L 15 (L)    Anion Gap      7 - 15 mmol/L 18 (H)    Urea Nitrogen      8.0 - 23.0 mg/dL 54.2 (H)    Creatinine      0.51 - 0.95 mg/dL 2.99 (H)    GFR Estimate      >60 mL/min/1.73m2 16 (L)    Calcium      8.8 - 10.4 mg/dL 9.4    Chloride      98 - 107 mmol/L 97 (L)    Glucose      70 - 99 mg/dL 180 (H)    Alkaline Phosphatase      40 - 150 U/L 122    AST      0 - 45 U/L 20    ALT      0 - 50 U/L 19    Protein Total      6.4 - 8.3 g/dL 7.4    Albumin      3.5 - 5.2 g/dL 4.2    Bilirubin Total      <=1.2 mg/dL 0.5    RBC Morphology Confirmed RBC Indices    Platelet Morphology      Automated Count Confirmed. Platelet morphology is normal.  Automated Count Confirmed. Platelet morphology is normal.    Magnesium      1.7 - 2.3 mg/dL 2.1       Legend:  (H) High  (L) Low  (LL) Low Panic    Imaging Studies:  None this visit.     ASSESSMENT/PLAN:  #1 Multiple myeloma IgG kappa multiple myeloma with 80% involvement of the bone marrow diagnosed June 2019 initially treated with Velcade and Decadron and received 4 cycles with increasing M-spike and kappa/lambda ratio.  Treatment changed to CyBorD on 10/1/2019.  M-spike plateau at 1.2 and treatment changed to monotherapy  with Darzalex Faspro injection. M spike declined to 1.0 then increased again to 1.2 so Velcade and Dex added to her treatment plan with cycle 5 therapy. Following C22 (April 2022), she did initiate a treatment holiday when her Mspike was 0.3.     We had followed her counts for over a year. Unfortunately, more recently, she did see a rise in her mspike to 0.8. PET completed was negative. Dr. Walker felt this was a biochemical progression and after discussion with patient, elected to resume treatment with Darzalex faspro. Mspike began to rise. PET was negative but feeling like she wasn't responding to Darzalex alone, decided to add in Pomalyst 1 mg p.o. daily on days 1 through 21 to begin with cycle 2 in addition to dexamethasone 20 mg p.o. on days 1 ,8,15,22.    Today, she is feeling poorly. I am going to hold Darzalex today, along with IVIG, Zometa. I am going to have her hold her Pomalyst this week. I will see her back in one week. Labs show no evidence of monoclonal protein or worsening myeloma.     #2 Lytic lesions Hx lytic lesion at L3. Zometa Q3M, due today. Will defer to next week. She remains on calcium with vitamin D twice a day. Weight bearing activity.    #3 Hypogammaglobulinemia Hx decreased IgG levels and recurrent ear infections, Dr. Walker elected to commence IVIG. This is to be given every 4 weeks for IgG levels less than 600. Levels recently 444. Will defer today and give IVIG next month.     #4 Tremor No mention of this today, will continue to monitor.     #5 Acute viral gastroenteritis  #6 YASIR  #7 Hypokalemia  #8 Metabolic acidosis  First, patient does note that today if the first day which she is doing a little bit better with no diarrhea or vomiting. But leading up, patient has spent much of the last week with vomiting and diarrhea. Her labs endorse metabolic acidosis and YASIR and hypokalemia. Will provide 1 liter NS today over 2 hours along with 60 meq potassium. Will also provide fluids  Wednesday and Friday this week. Will recheck labs Wednesday and replete per SmartSet each day. Patient educated that if she starts having chest pain, arrhythmia, etc, she needs to go to ED. Was made aware how important potassium is for cardiac function. Has been doing well in regards to diarrhea and vomiting for about 24 hours so hopeful that these supportive cares will get her feeling a bit better. She is doing well so I think supportive cares are appropriate but education provided that if condition worsens, she should go to ED. After she receives fluids today, will recheck CMP.         Patient in agreement with plan and verbalizes understanding. Agrees to call with any questions or concerns.      51 minutes spent in the patient's encounter today with time spent in review of patient's chart along with chart preparation and review of the treatment plan and signing of treatment plan.  Time was also spent with the patient in obtaining a review of systems and performing a physical exam along with detailed review of all test results. Time was also spent in discussing plan for future follow-up and relating instructions for follow-up and in placing future orders.    The longitudinal plan of care for the diagnosis(es)/condition(s) as documented were addressed during this visit. Due to the added complexity in care, I will continue to support Carmelita in the subsequent management and with ongoing continuity of care.        FLO Duque, VAMSIP-C

## 2025-04-07 NOTE — TELEPHONE ENCOUNTER
Decadron      Last Written Prescription Date:  3.10.25  Last Fill Quantity: #15,   # refills: 0  Last Office Visit: 4.7.25  Future Office visit:    Next 5 appointments (look out 90 days)      Apr 09, 2025 10:30 AM  (Arrive by 10:15 AM)  Nurse Only with Claudine Carmona  [facility] ([facility] ) [address]  [place] [address]  [phone]     Apr 14, 2025 3:30 PM  (Arrive by 3:15 PM)  Return Visit with FLO Robertson [facility] ([facility] ) [address]  [place] [address]  [phone]     Apr 16, 2025 8:45 AM  (Arrive by 8:30 AM)  Return Visit with Bridgette Ly PA-C  [facility] ([facility] ) [address]  [place] [address]  [phone]             Routing refill request to provider for review/approval because:  Drug not on the [facility], [facility] or [facility] refill protocol or controlled substance

## 2025-04-07 NOTE — NURSING NOTE
"Oncology Rooming Note    April 7, 2025 9:08 AM   Carmelita Watts is a 74 year old female who presents for:    Chief Complaint   Patient presents with    Oncology Clinic Visit     Follow up. Multiple myeloma not having achieved remission      Initial Vitals: /60 (BP Location: Right arm, Patient Position: Sitting, Cuff Size: Adult Regular)   Pulse 100   Temp 97.3  F (36.3  C) (Tympanic)   Ht 1.6 m (5' 3\")   Wt 73.5 kg (162 lb 0.6 oz)   SpO2 97%   BMI 28.70 kg/m   Estimated body mass index is 28.7 kg/m  as calculated from the following:    Height as of this encounter: 1.6 m (5' 3\").    Weight as of this encounter: 73.5 kg (162 lb 0.6 oz). Body surface area is 1.81 meters squared.  No Pain (0) Comment: Data Unavailable   No LMP recorded. Patient is postmenopausal.  Allergies reviewed: Yes  Medications reviewed: Yes    Medications: Medication refills not needed today.  Pharmacy name entered into Geenapp:    [id number] - [place], [place] - [address] AT [facility] [address]  [facility] - [place], [place] - [address]    Frailty Screening:   Is the patient here for a new oncology consult visit in cancer care? 2. No    PHQ9:  Did this patient require a PHQ9?: No      Clinical concerns: vomiting and diarrhea started last Monday night has continued daily until this past Saturday. Able to keep some fluids and soft foods down, mainly yogurt and crackers. Taking Imodium prn for diarrhea which helped.   Iram  was notified.      Julia See LPN              "

## 2025-04-07 NOTE — PROGRESS NOTES
Per Tayla pharmacist per ambulatory electrolyte replacement protocol patient to receive K+ 30 mEq IV and K+ 40 mEq orally today. Orders released per smart set.

## 2025-04-07 NOTE — PROGRESS NOTES
Infusion Nursing Note:  Carmelita Watts presents today for ivf/potassium replacement.    Patient seen by provider today: Yes: Iram Gonzalez   present during visit today: Not Applicable.    Note: per Iram Gonzalez to draw additional CMP before pt left, pt does not need to wait for results as she has a mammogram apt this afternoon. Per provider will call with results. .      Intravenous Access:  Labs drawn without difficulty.  Peripheral IV placed.    Treatment Conditions:  Lab Results   Component Value Date    HGB 15.5 04/07/2025    WBC 8.8 04/07/2025    ANEU 8.8 (H) 08/06/2024    ANEUTAUTO 5.1 04/07/2025     04/07/2025        Lab Results   Component Value Date     (L) 04/07/2025    POTASSIUM 2.5 (LL) 04/07/2025    MAG 2.1 04/07/2025    CR 2.99 (H) 04/07/2025    PAYAL 9.4 04/07/2025    BILITOTAL 0.5 04/07/2025    ALBUMIN 4.2 04/07/2025    ALT 19 04/07/2025    AST 20 04/07/2025       Results reviewed, labs MET treatment parameters, ok to proceed with treatment.      Post Infusion Assessment:  Patient tolerated infusion without incident.  Blood return noted pre and post infusion.  Site patent and intact, free from redness, edema or discomfort.  No evidence of extravasations.  Access discontinued per protocol.       Discharge Plan:   Patient and/or family verbalized understanding of discharge instructions and all questions answered.      UZMA OCAMPO

## 2025-04-08 ENCOUNTER — TELEPHONE (OUTPATIENT)
Dept: ONCOLOGY | Facility: OTHER | Age: 75
End: 2025-04-08

## 2025-04-08 NOTE — TELEPHONE ENCOUNTER
Patient called to state her Potassium wasn't at [facility]. Reached out to pharmacy, they did get the RX yesterday but never got around to filling in time, will get ready for patient in about an hour. Pt notified.

## 2025-04-09 ENCOUNTER — ALLIED HEALTH/NURSE VISIT (OUTPATIENT)
Dept: AUDIOLOGY | Facility: OTHER | Age: 75
End: 2025-04-09
Attending: AUDIOLOGIST
Payer: MEDICARE

## 2025-04-09 ENCOUNTER — INFUSION THERAPY VISIT (OUTPATIENT)
Dept: INFUSION THERAPY | Facility: OTHER | Age: 75
End: 2025-04-09
Attending: NURSE PRACTITIONER
Payer: MEDICARE

## 2025-04-09 ENCOUNTER — OFFICE VISIT (OUTPATIENT)
Dept: AUDIOLOGY | Facility: OTHER | Age: 75
End: 2025-04-09
Attending: AUDIOLOGIST
Payer: MEDICARE

## 2025-04-09 VITALS
RESPIRATION RATE: 16 BRPM | WEIGHT: 162 LBS | OXYGEN SATURATION: 98 % | BODY MASS INDEX: 28.7 KG/M2 | HEIGHT: 63 IN | TEMPERATURE: 99 F | DIASTOLIC BLOOD PRESSURE: 53 MMHG | SYSTOLIC BLOOD PRESSURE: 103 MMHG | HEART RATE: 50 BPM

## 2025-04-09 DIAGNOSIS — H91.93 DECREASED HEARING OF BOTH EARS: ICD-10-CM

## 2025-04-09 DIAGNOSIS — C90.00 MULTIPLE MYELOMA NOT HAVING ACHIEVED REMISSION (H): Primary | ICD-10-CM

## 2025-04-09 DIAGNOSIS — E87.6 HYPOKALEMIA: ICD-10-CM

## 2025-04-09 DIAGNOSIS — R19.7 DIARRHEA, UNSPECIFIED TYPE: ICD-10-CM

## 2025-04-09 DIAGNOSIS — H91.93 DECREASED HEARING OF BOTH EARS: Primary | ICD-10-CM

## 2025-04-09 DIAGNOSIS — R19.7 DIARRHEA, UNSPECIFIED TYPE: Primary | ICD-10-CM

## 2025-04-09 LAB
ALBUMIN SERPL BCG-MCNC: 3.7 G/DL (ref 3.5–5.2)
ALP SERPL-CCNC: 108 U/L (ref 40–150)
ALT SERPL W P-5'-P-CCNC: 22 U/L (ref 0–50)
ANION GAP SERPL CALCULATED.3IONS-SCNC: 12 MMOL/L (ref 7–15)
AST SERPL W P-5'-P-CCNC: 27 U/L (ref 0–45)
BILIRUB SERPL-MCNC: 0.7 MG/DL
BUN SERPL-MCNC: 27.7 MG/DL (ref 8–23)
CALCIUM SERPL-MCNC: 9.3 MG/DL (ref 8.8–10.4)
CHLORIDE SERPL-SCNC: 105 MMOL/L (ref 98–107)
CREAT SERPL-MCNC: 0.88 MG/DL (ref 0.51–0.95)
EGFRCR SERPLBLD CKD-EPI 2021: 68 ML/MIN/1.73M2
GLUCOSE SERPL-MCNC: 141 MG/DL (ref 70–99)
HCO3 SERPL-SCNC: 17 MMOL/L (ref 22–29)
POTASSIUM SERPL-SCNC: 3.8 MMOL/L (ref 3.4–5.3)
PROT SERPL-MCNC: 6.3 G/DL (ref 6.4–8.3)
SODIUM SERPL-SCNC: 134 MMOL/L (ref 135–145)

## 2025-04-09 PROCEDURE — 92557 COMPREHENSIVE HEARING TEST: CPT | Performed by: AUDIOLOGIST

## 2025-04-09 PROCEDURE — 80051 ELECTROLYTE PANEL: CPT | Mod: ZL

## 2025-04-09 PROCEDURE — 36415 COLL VENOUS BLD VENIPUNCTURE: CPT

## 2025-04-09 PROCEDURE — 258N000003 HC RX IP 258 OP 636: Performed by: NURSE PRACTITIONER

## 2025-04-09 PROCEDURE — 92567 TYMPANOMETRY: CPT | Performed by: AUDIOLOGIST

## 2025-04-09 PROCEDURE — 84155 ASSAY OF PROTEIN SERUM: CPT | Mod: ZL

## 2025-04-09 RX ORDER — HEPARIN SODIUM,PORCINE 10 UNIT/ML
5-20 VIAL (ML) INTRAVENOUS DAILY PRN
OUTPATIENT
Start: 2025-04-09

## 2025-04-09 RX ORDER — HEPARIN SODIUM (PORCINE) LOCK FLUSH IV SOLN 100 UNIT/ML 100 UNIT/ML
5 SOLUTION INTRAVENOUS
OUTPATIENT
Start: 2025-04-09

## 2025-04-09 RX ADMIN — SODIUM CHLORIDE 1000 ML: 9 INJECTION, SOLUTION INTRAVENOUS at 11:28

## 2025-04-09 ASSESSMENT — PAIN SCALES - GENERAL: PAINLEVEL_OUTOF10: NO PAIN (0)

## 2025-04-09 NOTE — PROGRESS NOTES
HEARING AID CHECK    BACKGROUND:  Carmelita Watts, a 74 year old female, was seen today for an hearing aid check.  Ms. Watts wears Binaural O[employer]on OPN [employer].  Ms. Watts reported no concerns.    FINDINGS:  Visual inspection and cleaning provided.   C-stop showed cerumen impaction and changed with new. 8 mm DV domes changed with new. Battery was tested and good. Good listening check.    Reviewed cleaning, troubleshooting, and preventative care with patient. Any cleaning tools used were provided as customer courtesy.    Services performed and warranty reviewed with patient.    PLAN:  Patient was seen next by the audiologist. Ms. Watts will return to clinic in 12 months, sooner if needed. Patient had no further questions and reports satisfaction.         Claudine Carmona  Audiology Assistant  [facility]  [phone]

## 2025-04-09 NOTE — PROGRESS NOTES
Infusion Nursing Note:  Carmelita Watts presents today for IVF.    Patient seen by provider today: No but denies any changes or concerns since yesterdays provider assessment.    present during visit today: Not Applicable.    Note: No lab orders dated today, secure chat to Nida Gonzalez NP ONC requesting. CMP ordered by provider, and obtained prior to IVF.    Treatment Conditions:  Lab Results   Component Value Date     (L) 04/09/2025    POTASSIUM 3.8 04/09/2025    MAG 2.1 04/07/2025    CR 0.88 04/09/2025    PAYAL 9.3 04/09/2025    BILITOTAL 0.7 04/09/2025    ALBUMIN 3.7 04/09/2025    ALT 22 04/09/2025    AST 27 04/09/2025     Patient updated on improvement in lab results and no indication for any electrolyte replacement IV or on unit today. Encouraged to continue taking home medications as prescribed, and to keep IVF/labs appt for Friday this week, to ensure improving status continues through weekend. Patient verbalizes understanding.     Post Infusion Assessment:  Patient tolerated infusion without incident.  Site patent and intact, free from redness, edema or discomfort.  No evidence of extravasations.  Access discontinued per protocol.       Discharge Plan:   Patient discharged in stable condition accompanied by: self.  Departure Mode: Ambulatory.

## 2025-04-09 NOTE — PROGRESS NOTES
Audiology Evaluation Completed. Please refer SCANNED AUDIOGRAM and/or TYMPANOGRAM for BACKGROUND, RESULTS, RECOMMENDATIONS.      Bia BULLOCK, [facility]  Audiologist #[id number]

## 2025-04-10 DIAGNOSIS — C90.00 MULTIPLE MYELOMA NOT HAVING ACHIEVED REMISSION (H): Primary | ICD-10-CM

## 2025-04-10 RX ORDER — METHYLPREDNISOLONE SODIUM SUCCINATE 125 MG/2ML
125 INJECTION INTRAMUSCULAR; INTRAVENOUS
Start: 2025-04-10

## 2025-04-10 RX ORDER — DEXAMETHASONE 4 MG/1
20 TABLET ORAL ONCE
OUTPATIENT
Start: 2025-04-10

## 2025-04-10 RX ORDER — MEPERIDINE HYDROCHLORIDE 25 MG/ML
25 INJECTION INTRAMUSCULAR; INTRAVENOUS; SUBCUTANEOUS EVERY 30 MIN PRN
OUTPATIENT
Start: 2025-04-10

## 2025-04-10 RX ORDER — LORAZEPAM 2 MG/ML
0.5 INJECTION INTRAMUSCULAR EVERY 4 HOURS PRN
OUTPATIENT
Start: 2025-04-10

## 2025-04-10 RX ORDER — EPINEPHRINE 1 MG/ML
0.3 INJECTION, SOLUTION, CONCENTRATE INTRAVENOUS EVERY 5 MIN PRN
OUTPATIENT
Start: 2025-04-10

## 2025-04-10 RX ORDER — DIPHENHYDRAMINE HYDROCHLORIDE 50 MG/ML
50 INJECTION, SOLUTION INTRAMUSCULAR; INTRAVENOUS
Start: 2025-04-10

## 2025-04-10 RX ORDER — ALBUTEROL SULFATE 0.83 MG/ML
2.5 SOLUTION RESPIRATORY (INHALATION)
OUTPATIENT
Start: 2025-04-10

## 2025-04-10 RX ORDER — DIPHENHYDRAMINE HCL 25 MG
50 CAPSULE ORAL
OUTPATIENT
Start: 2025-04-10

## 2025-04-10 RX ORDER — ALBUTEROL SULFATE 90 UG/1
1-2 INHALANT RESPIRATORY (INHALATION)
Start: 2025-04-10

## 2025-04-10 RX ORDER — ACETAMINOPHEN 325 MG/1
650 TABLET ORAL ONCE
OUTPATIENT
Start: 2025-04-10

## 2025-04-10 RX ORDER — MONTELUKAST SODIUM 10 MG/1
10 TABLET ORAL ONCE
OUTPATIENT
Start: 2025-04-10

## 2025-04-10 RX ORDER — DIPHENHYDRAMINE HCL 25 MG
50 CAPSULE ORAL ONCE
OUTPATIENT
Start: 2025-04-10

## 2025-04-10 RX ORDER — DEXAMETHASONE SODIUM PHOSPHATE 4 MG/ML
20 INJECTION, SOLUTION INTRA-ARTICULAR; INTRALESIONAL; INTRAMUSCULAR; INTRAVENOUS; SOFT TISSUE ONCE
Start: 2025-04-10 | End: 2025-04-10

## 2025-04-10 RX ORDER — ACETAMINOPHEN 325 MG/1
650 TABLET ORAL
OUTPATIENT
Start: 2025-04-10

## 2025-04-14 ENCOUNTER — ONCOLOGY VISIT (OUTPATIENT)
Dept: ONCOLOGY | Facility: OTHER | Age: 75
End: 2025-04-14
Attending: NURSE PRACTITIONER
Payer: MEDICARE

## 2025-04-14 ENCOUNTER — LAB (OUTPATIENT)
Dept: LAB | Facility: OTHER | Age: 75
End: 2025-04-14
Attending: NURSE PRACTITIONER
Payer: COMMERCIAL

## 2025-04-14 VITALS
HEIGHT: 63 IN | TEMPERATURE: 98.8 F | BODY MASS INDEX: 30.74 KG/M2 | OXYGEN SATURATION: 95 % | HEART RATE: 103 BPM | WEIGHT: 173.5 LBS | DIASTOLIC BLOOD PRESSURE: 62 MMHG | SYSTOLIC BLOOD PRESSURE: 118 MMHG

## 2025-04-14 DIAGNOSIS — C90.00 MULTIPLE MYELOMA NOT HAVING ACHIEVED REMISSION (H): Primary | ICD-10-CM

## 2025-04-14 DIAGNOSIS — Z86.39 HISTORY OF HYPOKALEMIA: ICD-10-CM

## 2025-04-14 DIAGNOSIS — R19.7 DIARRHEA, UNSPECIFIED TYPE: ICD-10-CM

## 2025-04-14 DIAGNOSIS — Z86.69 HISTORY OF TREMOR: ICD-10-CM

## 2025-04-14 DIAGNOSIS — D80.1 HYPOGAMMAGLOBULINEMIA: ICD-10-CM

## 2025-04-14 DIAGNOSIS — M89.9 BONE LESION: ICD-10-CM

## 2025-04-14 DIAGNOSIS — C90.00 MULTIPLE MYELOMA NOT HAVING ACHIEVED REMISSION (H): ICD-10-CM

## 2025-04-14 LAB
ALBUMIN SERPL BCG-MCNC: 3.8 G/DL (ref 3.5–5.2)
ALP SERPL-CCNC: 139 U/L (ref 40–150)
ALT SERPL W P-5'-P-CCNC: 17 U/L (ref 0–50)
ANION GAP SERPL CALCULATED.3IONS-SCNC: 7 MMOL/L (ref 7–15)
AST SERPL W P-5'-P-CCNC: 18 U/L (ref 0–45)
BASOPHILS # BLD AUTO: 0.1 10E3/UL (ref 0–0.2)
BASOPHILS NFR BLD AUTO: 2 %
BILIRUB SERPL-MCNC: 0.2 MG/DL
BUN SERPL-MCNC: 16.3 MG/DL (ref 8–23)
CALCIUM SERPL-MCNC: 9.7 MG/DL (ref 8.8–10.4)
CHLORIDE SERPL-SCNC: 102 MMOL/L (ref 98–107)
CREAT SERPL-MCNC: 0.8 MG/DL (ref 0.51–0.95)
EGFRCR SERPLBLD CKD-EPI 2021: 76 ML/MIN/1.73M2
EOSINOPHIL # BLD AUTO: 0.3 10E3/UL (ref 0–0.7)
EOSINOPHIL NFR BLD AUTO: 4 %
ERYTHROCYTE [DISTWIDTH] IN BLOOD BY AUTOMATED COUNT: 15.7 % (ref 10–15)
GLUCOSE SERPL-MCNC: 162 MG/DL (ref 70–99)
HCO3 SERPL-SCNC: 31 MMOL/L (ref 22–29)
HCT VFR BLD AUTO: 38 % (ref 35–47)
HGB BLD-MCNC: 12.7 G/DL (ref 11.7–15.7)
IMM GRANULOCYTES # BLD: 0.1 10E3/UL
IMM GRANULOCYTES NFR BLD: 1 %
LYMPHOCYTES # BLD AUTO: 2.4 10E3/UL (ref 0.8–5.3)
LYMPHOCYTES NFR BLD AUTO: 36 %
MCH RBC QN AUTO: 30.5 PG (ref 26.5–33)
MCHC RBC AUTO-ENTMCNC: 33.4 G/DL (ref 31.5–36.5)
MCV RBC AUTO: 91 FL (ref 78–100)
MONOCYTES # BLD AUTO: 0.5 10E3/UL (ref 0–1.3)
MONOCYTES NFR BLD AUTO: 8 %
NEUTROPHILS # BLD AUTO: 3.3 10E3/UL (ref 1.6–8.3)
NEUTROPHILS NFR BLD AUTO: 49 %
NRBC # BLD AUTO: 0 10E3/UL
NRBC BLD AUTO-RTO: 0 /100
PLATELET # BLD AUTO: 293 10E3/UL (ref 150–450)
POTASSIUM SERPL-SCNC: 4.4 MMOL/L (ref 3.4–5.3)
PROT SERPL-MCNC: 6.5 G/DL (ref 6.4–8.3)
RBC # BLD AUTO: 4.17 10E6/UL (ref 3.8–5.2)
SODIUM SERPL-SCNC: 140 MMOL/L (ref 135–145)
WBC # BLD AUTO: 6.7 10E3/UL (ref 4–11)

## 2025-04-14 PROCEDURE — 82947 ASSAY GLUCOSE BLOOD QUANT: CPT | Mod: ZL

## 2025-04-14 PROCEDURE — 85004 AUTOMATED DIFF WBC COUNT: CPT | Mod: ZL

## 2025-04-14 PROCEDURE — 36415 COLL VENOUS BLD VENIPUNCTURE: CPT | Mod: ZL

## 2025-04-14 PROCEDURE — G0463 HOSPITAL OUTPT CLINIC VISIT: HCPCS

## 2025-04-14 PROCEDURE — 82310 ASSAY OF CALCIUM: CPT | Mod: ZL

## 2025-04-14 RX ORDER — ZOLEDRONIC ACID 0.04 MG/ML
4 INJECTION, SOLUTION INTRAVENOUS ONCE
Status: CANCELLED | OUTPATIENT
Start: 2025-04-15 | End: 2025-04-14

## 2025-04-14 RX ORDER — HEPARIN SODIUM,PORCINE 10 UNIT/ML
5-20 VIAL (ML) INTRAVENOUS DAILY PRN
Status: CANCELLED | OUTPATIENT
Start: 2025-04-15

## 2025-04-14 RX ORDER — HEPARIN SODIUM (PORCINE) LOCK FLUSH IV SOLN 100 UNIT/ML 100 UNIT/ML
5 SOLUTION INTRAVENOUS
Status: CANCELLED | OUTPATIENT
Start: 2025-04-15

## 2025-04-14 ASSESSMENT — PAIN SCALES - GENERAL: PAINLEVEL_OUTOF10: NO PAIN (0)

## 2025-04-14 NOTE — PROGRESS NOTES
Oncology Follow-up Visit    Reason for Visit:  Carmelita is a 74 year old woman with a diagnosis of multiple myeloma, who I am visiting with today for routine follow-up and consideration of ongoing therapy.     Nursing Note and documentation reviewed: Yes    Interval History:   I held Carlene's treatment last week given some sort of presumably GI illness causing large amounts of diarrhea and vomiting. Her potassium was 2.5. We repleted her potassium both IV and oral last week, and gave IVF 3 days. She is feeling much better. She was able to go for a 1/2 mile walk today and did well. She didn't feel weak. No fever or chills. No breathing or bleeding concerns. No  further nausea, vomiting, or diarrhea. Her appetite is back. Ate a good [employer] brunch yesterday with no ill side effects. Energy improving.      Oncologic History  12/31/2018 Presented to the emergency room with vertigo and fatigue.  CT scan of the head was negative and subsequent stress test was negative.  05/03/2019 PCP ordered lab work and noted a total protein of 12.9.  SPEP at that time showed an M spike of 6.2 with a large monoclonal protein seen in the gamma fraction. Urine immunofixation showed a possible small protein band in the gamma fraction  05/31/2019 Evaluated by Dr. Walker with Medical Oncology with plan to rule out myeloma and obtain bone marrow aspiration biopsy as well as a metastatic bone survey along with additional labwork  06/18/2019 Underwent bone marrow aspiration and biopsy  06/24/2019 Seen again by Dr. Walker and CBC showed a hemoglobin of 9.3, M spike 7.3 with monoclonal IgG immunoglobulin of kappa light chain type; serum viscosity was 2.9; quantitative immunoglobulins showed an IgG of 8160, beta-2 microglobulin was 5.8, BUN was 21 with creatinine is 0.8 and total protein was 13.7.  Quantitative kappa/lambda free light chains showed an elevated ratio of 17.0 bone marrow aspiration biopsy showed plasma cell myeloma with approximately  80% plasma cells.  Immunofixation showed IgG kappa and flow cytometry revealed kappa monotypic plasma cells consistent with clonal plasma cell neoplasm and FISH panel was pending at that time. It was felt she had at least stage II disease based on her beta-2 microglobulin and anemia. Plan was to treat with Velcade 1.3 mg per/m2 days 1, 4, 8 and 11/Decadron 40 mg on days 1, 8 and 15. Initiation of Revlimid with C2 at 25 mg daily days 1 through 14.  Plan was to also obtain an MRI of the lumbar spine to rule out lytic lesion at L3.  She was initiated on Zovirax 400 mg p.o. twice daily.  06/25/2019 C1 of chemotherapy initiated  07/01/2019 Note in chart regarding patient's large co-pay for the Revlimid and no plan at this point to initiate Revlimid and treat only with Velcade and Decadron per Dr. Walker  07/11/2019 MRI lumbar spine shows a pathologic superior endplate compression fracture at L3 without evidence of retropulsed fragment and innumerable enhancing lesions throughout the lumbar spine consistent with history of multiple myeloma.  09/10/2019 Increasing M-spike and kappa lambda ratio  10/01/2019 Initiation of CyBorD  11/17/2020 Plateau of M-spike at 1.2 after 36 cycles of CyBorD  12/01/2020 Initiation of Darzalex Faspro injection  03/23/2021 M-spike increased form 1.0 to 1.2 and velcade and dexamethasone added to plan  04/12/2022 Treatment HOLIDAY commenced with Mspike 0.3 after 22 cycles of treatent  05/27/2022 Received Evusheld while on treatment holiday.   07/18/2023 Mspike 0.8. Other labs stable. PET scan ordered.   08/02/2023 PET completed showing negative study. No evidence of mass or lymphadenopathy. No concerning hypermetabolism is present.  08/14/2023 Met with Dr. Walker. Given biochemical progression, he did feel appropriate to resume therapy again with Darzalex faspro  03/12/2023 Following C7, mspike magy from 0.2-->0.8. PET ordered which was negative. Again increased frequency of Darzalex with  "anticipation of adding in Pomalyst with C2.    04/29/2024 Addition of Pomalyst with C2  12/16/2024 IVIG added for suppressed IgG levels and increased infections    Current Chemo Regime/TX: Darzalex faspro injection 1800mg subcutaneous per protocol and Pomalyst 1 mg days 1-21 of 28 day cycle given with weekly Dex  Current Cycle: C14   Completed cycles: 13  **Pom added C2 (4/29)     Previous treatment:     Velcade 1.3 mg/m2 days 1, 4, 8 and 11 with Decadron 40 mg days 1, 8 and 15 x 4 cycles;     Velcade 1.5mg.m2, cyclophosphamide 150mg every 7 days on days 1,8,15 and 22 with decadron 40mg days 1,8,15,22  Darzalex faspro injection 1800mg subcutaneous per protocol    Past Medical History:   Diagnosis Date    Arthritis     Cyst of breast     Depressive disorder     Diabetes mellitus, type 2 (H) 01/18/2021    Essential hypertension 10/01/2015    Major depressive disorder, recurrent episode, mild 10/01/2015    Mixed hyperlipidemia 7/5/2013    Multiple myeloma not having achieved remission (H) 06/24/2019    Other specified disorders of bladder 07/09/2012    Irritable Bladder    Seasonal allergies 10/01/2015    Unspecified essential hypertension 03/19/2007    Unspecified sinusitis (chronic) 09/05/2007       Past Surgical History:   Procedure Laterality Date    APPENDECTOMY      BONE MARROW BIOPSY, BONE SPECIMEN, NEEDLE/TROCAR N/A 06/18/2019    Procedure: BONE MARROW BIOPSY;  Surgeon: Maciej Sanz MD;  Location: [facility] OR    CHOLECYSTECTOMY      COLONOSCOPY  07/18/2006    repeat 10 years    COLONOSCOPY N/A 12/30/2016    Procedure: COLONOSCOPY;  Surgeon: Bhaskar Franklin DO;  Location: [facility] OR    PUNC/ASPIR BREAST CYST Left 1995    benign    SINUS SURGERY      TUBAL STERILIZATION         Family History   Problem Relation Age of Onset    Breast Cancer Mother 60        Cause of Death; 1 breast    Parkinsonism Father         \"Possible\"    Coronary Artery Disease Father     Pacemaker Father     Thyroid Disease Daughter     " Breast Cancer Maternal Aunt         over 50    Diabetes No family hx of     Hypertension No family hx of     Hyperlipidemia No family hx of     Cerebrovascular Disease No family hx of     Colon Cancer No family hx of     Prostate Cancer No family hx of     Genetic Disorder No family hx of     Asthma No family hx of     Anesthesia Reaction No family hx of        Social History     Socioeconomic History    Marital status:      Spouse name: Not on file    Number of children: Not on file    Years of education: Not on file    Highest education level: Not on file   Occupational History    Occupation: Financial     Comment:  - (FT)   Tobacco Use    Smoking status: Never    Smokeless tobacco: Never    Tobacco comments:     Tried to Quit (YES); QUIT in 1971; Passive Exposure (NO)   Vaping Use    Vaping status: Never Used   Substance and Sexual Activity    Alcohol use: Yes     Comment: RARELY    Drug use: No    Sexual activity: Yes     Partners: Male     Birth control/protection: None   Other Topics Concern     Service Not Asked    Blood Transfusions Not Asked    Caffeine Concern Yes     Comment: Coffee >6 cups daily    Occupational Exposure Not Asked    Hobby Hazards Not Asked    Sleep Concern Not Asked    Stress Concern Not Asked    Weight Concern Not Asked    Special Diet Not Asked    Back Care Not Asked    Exercise Not Asked    Bike Helmet Not Asked    Seat Belt Not Asked    Self-Exams Not Asked    Parent/sibling w/ CABG, MI or angioplasty before 65F 55M? No   Social History Narrative    Not on file     Social Drivers of Health     Financial Resource Strain: Low Risk  (1/7/2025)    Financial Resource Strain     Within the past 12 months, have you or your family members you live with been unable to get utilities (heat, electricity) when it was really needed?: No   Food Insecurity: Low Risk  (1/7/2025)    Food Insecurity     Within the past 12 months, did you worry that your food would run out  before you got money to buy more?: No     Within the past 12 months, did the food you bought just not last and you didn t have money to get more?: No   Transportation Needs: Low Risk  (1/7/2025)    Transportation Needs     Within the past 12 months, has lack of transportation kept you from medical appointments, getting your medicines, non-medical meetings or appointments, work, or from getting things that you need?: No   Physical Activity: Not on file   Stress: Not on file   Social Connections: Not on file   Interpersonal Safety: Low Risk  (4/14/2025)    Interpersonal Safety     Do you feel physically and emotionally safe where you currently live?: Yes     Within the past 12 months, have you been hit, slapped, kicked or otherwise physically hurt by someone?: No     Within the past 12 months, have you been humiliated or emotionally abused in other ways by your partner or ex-partner?: No   Housing Stability: Low Risk  (1/7/2025)    Housing Stability     Do you have housing? : Yes     Are you worried about losing your housing?: No       Current Outpatient Medications   Medication Sig Dispense Refill    acyclovir (ZOVIRAX) 400 MG tablet Take 1 tablet (400 mg) by mouth 2 times daily Start 1 week prior to daratumumab and continue for 3 months after daratumumab treatment complete. 60 tablet 11    aspirin (ASA) 81 MG chewable tablet Take 81 mg by mouth daily      atorvastatin (LIPITOR) 20 MG tablet TAKE 1 TABLET BY MOUTH DAILY 90 tablet 3    cetirizine (ZYRTEC) 10 MG tablet Take 1 tablet (10 mg) by mouth daily 30 tablet 1    dexAMETHasone (DECADRON) 4 MG tablet Take 5 tablets (20 mg) on Days 8, 15, and 22. 15 tablet 0    diphenhydrAMINE (BENADRYL) 25 MG capsule Take 25 mg by mouth daily.      fluticasone (FLONASE) 50 MCG/ACT nasal spray SPRAY 2 SPRAYS INTO BOTH NOSTRILS DAILY 48 mL 0    hydrocortisone (CORTAID) 1 % external cream APPLY SPARINGLY OVER RASH TO FACE AND CHEST TOPICALLY TWICE DAILY 45 g 0    L-Lysine HCl 500 MG  "CAPS Take by mouth daily.      loperamide (IMODIUM) 2 MG capsule Take 2 mg by mouth 4 times daily as needed for diarrhea.      losartan (COZAAR) 50 MG tablet TAKE 1 TABLET BY MOUTH DAILY 90 tablet 3    pomalidomide (POMALYST) 1 MG capsule Take 1 capsule (1 mg) by mouth daily for 21 days Day 1 thru 21 of each 28 day cycle. Swallow whole with water. 21 capsule 0    sertraline (ZOLOFT) 50 MG tablet TAKE 1/2 TABLET BY MOUTH DAILY 45 tablet 3    prochlorperazine (COMPAZINE) 10 MG tablet Take 1 tablet (10 mg) by mouth every 6 hours as needed for nausea or vomiting (Patient not taking: Reported on 4/14/2025) 30 tablet 2     No current facility-administered medications for this visit.        Allergies   Allergen Reactions    Lisinopril Cough    Phenylephrine Hcl Other (See Comments)     **Entex  HEADACHE (SEVERE)    Phenylpropanolamine Other (See Comments)     **Entex  HEADACHE (SEVERE)    Pseudoephedrine Tannate Other (See Comments)     **Entex  HEADACHE (SEVERE)    Levofloxacin Rash     **Levaquin    Moxifloxacin Hcl [Moxifloxacin Hcl In Nacl] Rash       Review Of Systems:  A complete review of systems is negative except for the above mentioned items in the interval history.     ECOG PS: 0    Physical Exam:  /62 (BP Location: Right arm, Patient Position: Sitting, Cuff Size: Adult Regular)   Pulse 103   Temp 98.8  F (37.1  C) (Tympanic)   Ht 1.6 m (5' 3\")   Wt 78.7 kg (173 lb 8 oz)   SpO2 95%   BMI 30.73 kg/m    GENERAL APPEARANCE: Healthy, alert and in no acute distress.  HEENT: Eyes appear normal without scleral icterus. Extraocular movements intact.   NECK:   Supple with normal range of motion. No asymmetry or masses.  RESP: Lungs clear to auscultation bilaterally, respirations regular and easy.  CARDIOVASCULAR: Regular rate and rhythm. Normal S1, S2; no murmur, gallop, or rub.  MUSCULOSKELETAL: Extremities without gross deformities noted. No edema of bilateral lower extremities.  SKIN: No suspicious lesions " or rashes.  NEURO: Alert and oriented x 3.  Gait steady.  PSYCHIATRIC: Mentation and affect appear normal.  Mood appropriate.      Laboratory:  Results for orders placed or performed in visit on 04/14/25   Comprehensive metabolic panel     Status: Abnormal   Result Value Ref Range    Sodium 140 135 - 145 mmol/L    Potassium 4.4 3.4 - 5.3 mmol/L    Carbon Dioxide (CO2) 31 (H) 22 - 29 mmol/L    Anion Gap 7 7 - 15 mmol/L    Urea Nitrogen 16.3 8.0 - 23.0 mg/dL    Creatinine 0.80 0.51 - 0.95 mg/dL    GFR Estimate 76 >60 mL/min/1.73m2    Calcium 9.7 8.8 - 10.4 mg/dL    Chloride 102 98 - 107 mmol/L    Glucose 162 (H) 70 - 99 mg/dL    Alkaline Phosphatase 139 40 - 150 U/L    AST 18 0 - 45 U/L    ALT 17 0 - 50 U/L    Protein Total 6.5 6.4 - 8.3 g/dL    Albumin 3.8 3.5 - 5.2 g/dL    Bilirubin Total 0.2 <=1.2 mg/dL   CBC with platelets and differential     Status: Abnormal   Result Value Ref Range    WBC Count 6.7 4.0 - 11.0 10e3/uL    RBC Count 4.17 3.80 - 5.20 10e6/uL    Hemoglobin 12.7 11.7 - 15.7 g/dL    Hematocrit 38.0 35.0 - 47.0 %    MCV 91 78 - 100 fL    MCH 30.5 26.5 - 33.0 pg    MCHC 33.4 31.5 - 36.5 g/dL    RDW 15.7 (H) 10.0 - 15.0 %    Platelet Count 293 150 - 450 10e3/uL    % Neutrophils 49 %    % Lymphocytes 36 %    % Monocytes 8 %    % Eosinophils 4 %    % Basophils 2 %    % Immature Granulocytes 1 %    NRBCs per 100 WBC 0 <1 /100    Absolute Neutrophils 3.3 1.6 - 8.3 10e3/uL    Absolute Lymphocytes 2.4 0.8 - 5.3 10e3/uL    Absolute Monocytes 0.5 0.0 - 1.3 10e3/uL    Absolute Eosinophils 0.3 0.0 - 0.7 10e3/uL    Absolute Basophils 0.1 0.0 - 0.2 10e3/uL    Absolute Immature Granulocytes 0.1 <=0.4 10e3/uL    Absolute NRBCs 0.0 10e3/uL   CBC with Platelets & Differential     Status: Abnormal    Narrative    The following orders were created for panel order CBC with Platelets & Differential.  Procedure                               Abnormality         Status                     ---------                                -----------         ------                     CBC with platelets and ...[[id number]]  Abnormal            Final result                 Please view results for these tests on the individual orders.         Imaging Studies:  None this visit.     ASSESSMENT/PLAN:  #1 Multiple myeloma IgG kappa multiple myeloma with 80% involvement of the bone marrow diagnosed June 2019 initially treated with Velcade and Decadron and received 4 cycles with increasing M-spike and kappa/lambda ratio.  Treatment changed to CyBorD on 10/1/2019.  M-spike plateau at 1.2 and treatment changed to monotherapy with Darzalex Faspro injection. M spike declined to 1.0 then increased again to 1.2 so Velcade and Dex added to her treatment plan with cycle 5 therapy. Following C22 (April 2022), she did initiate a treatment holiday when her Mspike was 0.3.     We had followed her counts for over a year. Unfortunately, more recently, she did see a rise in her mspike to 0.8. PET completed was negative. Dr. Walker felt this was a biochemical progression and after discussion with patient, elected to resume treatment with Darzalex faspro. Mspike began to rise. PET was negative but feeling like she wasn't responding to Darzalex alone, decided to add in Pomalyst 1 mg p.o. daily on days 1 through 21 to begin with cycle 2 in addition to dexamethasone 20 mg p.o. on days 1 ,8,15,22.    Today, she is doing much better. We will therefore commence next cycle of chemo with Pomalyst and Darzalex tomorrow. We will recheck myeloma labs in 3 week and I will see her back in 4 weeks prior to next cycle.     #2 Lytic lesions Hx lytic lesion at L3. Zometa Q3M, due now. Will treat tomorrow. Kidneys improved. She remains on calcium with vitamin D twice a day. Weight bearing activity.    #3 Hypogammaglobulinemia Hx decreased IgG levels and recurrent ear infections, Dr. Walker elected to commence IVIG. This is to be given every 4 weeks for IgG levels less than 600. Levels  recently 444. Will treat tomorrow.      #4 Tremor Resolved.     #5 Hx hypokalemia Levels are good. Repleted. Will discontinue further potassium seeing as her symptoms have resolved.         Patient in agreement with plan and verbalizes understanding. Agrees to call with any questions or concerns.      32 minutes spent in the patient's encounter today with time spent in review of patient's chart along with chart preparation and review of the treatment plan and signing of treatment plan.  Time was also spent with the patient in obtaining a review of systems and performing a physical exam along with detailed review of all test results. Time was also spent in discussing plan for future follow-up and relating instructions for follow-up and in placing future orders.    The longitudinal plan of care for the diagnosis(es)/condition(s) as documented were addressed during this visit. Due to the added complexity in care, I will continue to support Carmelita in the subsequent management and with ongoing continuity of care.        FLO Duque, FNP-C

## 2025-04-14 NOTE — NURSING NOTE
"Oncology Rooming Note    April 14, 2025 3:19 PM   Carmelita Watts is a 74 year old female who presents for:    Chief Complaint   Patient presents with    Oncology Clinic Visit     Follow up. Multiple myeloma not having achieved remission      Initial Vitals: /62 (BP Location: Right arm, Patient Position: Sitting, Cuff Size: Adult Regular)   Pulse 103   Temp 98.8  F (37.1  C) (Tympanic)   Ht 1.6 m (5' 3\")   Wt 78.7 kg (173 lb 8 oz)   SpO2 95%   BMI 30.73 kg/m   Estimated body mass index is 30.73 kg/m  as calculated from the following:    Height as of this encounter: 1.6 m (5' 3\").    Weight as of this encounter: 78.7 kg (173 lb 8 oz). Body surface area is 1.87 meters squared.  No Pain (0) Comment: Data Unavailable   No LMP recorded. Patient is postmenopausal.  Allergies reviewed: Yes  Medications reviewed: Yes    Medications: Medication refills not needed today.  Pharmacy name entered into ITN:    [id number] - [place], [place] - [address]  AT [facility] [address]  [facility] - [place], [place] - [address]    Frailty Screening:   Is the patient here for a new oncology consult visit in cancer care? 2. No    PHQ9:  Did this patient require a PHQ9?: No      Clinical concerns: none       Julia See LPN              "

## 2025-04-15 ENCOUNTER — INFUSION THERAPY VISIT (OUTPATIENT)
Dept: INFUSION THERAPY | Facility: OTHER | Age: 75
End: 2025-04-15
Attending: NURSE PRACTITIONER
Payer: MEDICARE

## 2025-04-15 VITALS
HEART RATE: 82 BPM | DIASTOLIC BLOOD PRESSURE: 77 MMHG | RESPIRATION RATE: 16 BRPM | TEMPERATURE: 97.9 F | OXYGEN SATURATION: 97 % | SYSTOLIC BLOOD PRESSURE: 119 MMHG

## 2025-04-15 DIAGNOSIS — D80.1 HYPOGAMMAGLOBULINEMIA: ICD-10-CM

## 2025-04-15 DIAGNOSIS — C90.00 MULTIPLE MYELOMA NOT HAVING ACHIEVED REMISSION (H): Primary | ICD-10-CM

## 2025-04-15 PROCEDURE — 250N000011 HC RX IP 250 OP 636: Mod: JZ | Performed by: NURSE PRACTITIONER

## 2025-04-15 PROCEDURE — 250N000011 HC RX IP 250 OP 636: Mod: JZ | Performed by: INTERNAL MEDICINE

## 2025-04-15 RX ORDER — DIPHENHYDRAMINE HYDROCHLORIDE 50 MG/ML
50 INJECTION, SOLUTION INTRAMUSCULAR; INTRAVENOUS
Start: 2025-04-15

## 2025-04-15 RX ORDER — ALBUTEROL SULFATE 0.83 MG/ML
2.5 SOLUTION RESPIRATORY (INHALATION)
OUTPATIENT
Start: 2025-04-15

## 2025-04-15 RX ORDER — MEPERIDINE HYDROCHLORIDE 25 MG/ML
25 INJECTION INTRAMUSCULAR; INTRAVENOUS; SUBCUTANEOUS
OUTPATIENT
Start: 2025-04-15

## 2025-04-15 RX ORDER — EPINEPHRINE 1 MG/ML
0.3 INJECTION, SOLUTION, CONCENTRATE INTRAVENOUS EVERY 5 MIN PRN
OUTPATIENT
Start: 2025-04-15

## 2025-04-15 RX ORDER — DEXAMETHASONE SODIUM PHOSPHATE 10 MG/ML
20 INJECTION, SOLUTION INTRA-ARTICULAR; INTRALESIONAL; INTRAMUSCULAR; INTRAVENOUS; SOFT TISSUE ONCE
Status: COMPLETED | OUTPATIENT
Start: 2025-04-15 | End: 2025-04-15

## 2025-04-15 RX ORDER — MONTELUKAST SODIUM 10 MG/1
10 TABLET ORAL ONCE
Status: COMPLETED | OUTPATIENT
Start: 2025-04-15 | End: 2025-04-15

## 2025-04-15 RX ORDER — DIPHENHYDRAMINE HCL 50 MG
50 CAPSULE ORAL ONCE
Start: 2025-04-15

## 2025-04-15 RX ORDER — ZOLEDRONIC ACID 0.04 MG/ML
4 INJECTION, SOLUTION INTRAVENOUS ONCE
Status: COMPLETED | OUTPATIENT
Start: 2025-04-15 | End: 2025-04-15

## 2025-04-15 RX ORDER — DIPHENHYDRAMINE HCL 50 MG
50 CAPSULE ORAL ONCE
Status: COMPLETED | OUTPATIENT
Start: 2025-04-15 | End: 2025-04-15

## 2025-04-15 RX ORDER — DIPHENHYDRAMINE HYDROCHLORIDE 50 MG/ML
25 INJECTION, SOLUTION INTRAMUSCULAR; INTRAVENOUS
Start: 2025-04-15

## 2025-04-15 RX ORDER — HYDROCORTISONE SODIUM SUCCINATE 100 MG/2ML
100 INJECTION INTRAMUSCULAR; INTRAVENOUS
OUTPATIENT
Start: 2025-04-15

## 2025-04-15 RX ORDER — ALBUTEROL SULFATE 90 UG/1
1-2 INHALANT RESPIRATORY (INHALATION)
Start: 2025-04-15

## 2025-04-15 RX ORDER — HEPARIN SODIUM,PORCINE 10 UNIT/ML
5-20 VIAL (ML) INTRAVENOUS DAILY PRN
OUTPATIENT
Start: 2025-04-15

## 2025-04-15 RX ORDER — ACETAMINOPHEN 325 MG/1
650 TABLET ORAL ONCE
Status: COMPLETED | OUTPATIENT
Start: 2025-04-15 | End: 2025-04-15

## 2025-04-15 RX ORDER — ACETAMINOPHEN 325 MG/1
650 TABLET ORAL ONCE
Start: 2025-04-15

## 2025-04-15 RX ORDER — HEPARIN SODIUM (PORCINE) LOCK FLUSH IV SOLN 100 UNIT/ML 100 UNIT/ML
5 SOLUTION INTRAVENOUS
OUTPATIENT
Start: 2025-04-15

## 2025-04-15 RX ORDER — METHYLPREDNISOLONE SODIUM SUCCINATE 40 MG/ML
40 INJECTION INTRAMUSCULAR; INTRAVENOUS
Start: 2025-04-15

## 2025-04-15 RX ADMIN — DEXAMETHASONE SODIUM PHOSPHATE 20 MG: 10 INJECTION INTRAMUSCULAR; INTRAVENOUS at 10:14

## 2025-04-15 RX ADMIN — ZOLEDRONIC ACID 4 MG: 0.04 INJECTION, SOLUTION INTRAVENOUS at 13:08

## 2025-04-15 RX ADMIN — Medication 250 ML: at 10:55

## 2025-04-15 RX ADMIN — HUMAN IMMUNOGLOBULIN G 20 G: 20 LIQUID INTRAVENOUS at 10:54

## 2025-04-15 RX ADMIN — ACETAMINOPHEN 650 MG: 325 TABLET ORAL at 10:15

## 2025-04-15 RX ADMIN — Medication 50 MG: at 10:14

## 2025-04-15 RX ADMIN — Medication 250 ML: at 10:13

## 2025-04-15 RX ADMIN — DARATUMUMAB AND HYALURONIDASE-FIHJ (HUMAN RECOMBINANT) 1800 MG: 1800; 30000 INJECTION SUBCUTANEOUS at 12:55

## 2025-04-15 RX ADMIN — MONTELUKAST SODIUM 10 MG: 10 TABLET ORAL at 10:15

## 2025-04-15 ASSESSMENT — PAIN SCALES - GENERAL: PAINLEVEL: NO PAIN (0)

## 2025-04-15 NOTE — PATIENT INSTRUCTIONS
We will see you back as planned. If you have any questions or concerns, we can be reached Monday through Friday 8am - 430pm at [phone] (PAC). If you have concerns related to a potential reaction/side effect after hours/weekends/holiday's, please seek emergent medical care.

## 2025-04-15 NOTE — PROGRESS NOTES
Chief Complaint   Patient presents with    Ear Problem    Follow Up     Follow up ears       Patient returns to ENT.  Patient was last seen 4/16/2024 at that time was doing well.  Her ears were cleaned. She will use drops intermittently at times but it does help her symptoms.      Today, Carmelita has been doing well overall.   Her ear has been overall fairly stable and feels there is some improvement with massage.     Hearing has been stable, hearing aids on daily basis.   She has felt better with right HA use, than left. No recent drainage.   She has felt some discomfort with wearing her left aid. She has been using her left aid more outside of home.      Denies otalgia, otorrhea  Denies worrisome tinnitus  Denies fluctuating hearing loss or tinnitus.   Denies vertigo or facial paraesthesia.    Right tube has been absent and right TM intact without effusion.  Left TM does have small perforation noted.   BTTI placed on 9/20/19 by Dr. Ley in office           Past Medical History:   Diagnosis Date    Arthritis     Cyst of breast     Depressive disorder     Diabetes mellitus, type 2 (H) 01/18/2021    Essential hypertension 10/01/2015    Major depressive disorder, recurrent episode, mild 10/01/2015    Mixed hyperlipidemia 7/5/2013    Multiple myeloma not having achieved remission (H) 06/24/2019    Other specified disorders of bladder 07/09/2012    Irritable Bladder    Seasonal allergies 10/01/2015    Unspecified essential hypertension 03/19/2007    Unspecified sinusitis (chronic) 09/05/2007        Allergies   Allergen Reactions    Lisinopril Cough    Phenylephrine Hcl Other (See Comments)     **Entex  HEADACHE (SEVERE)    Phenylpropanolamine Other (See Comments)     **Entex  HEADACHE (SEVERE)    Pseudoephedrine Tannate Other (See Comments)     **Entex  HEADACHE (SEVERE)    Levofloxacin Rash     **Levaquin    Moxifloxacin Hcl [Moxifloxacin Hcl In Nacl] Rash     ROS- SEE HPI  /79 (BP Location: Left arm,  "Patient Position: Sitting, Cuff Size: Adult Large)   Pulse 85   Temp 98.1  F (36.7  C) (Tympanic)   Resp 16   Ht 1.6 m (5' 2.99\")   Wt 78.7 kg (173 lb 8 oz)   SpO2 98%   BMI 30.74 kg/m      General - The patient is well nourished and well developed, and appears to have good nutritional status.    Head and Face - Normocephalic and atraumatic, with no gross asymmetry noted of the contour of the facial features.  The facial nerve is intact, with strong symmetric movements.  Eyes - Extraocular movements intact, and the pupils were reactive to light.  Sclera were not icteric or injected, conjunctiva were pink and moist.  Mouth - Examination of the oral cavity shows pink, healthy, moist mucosa.  No lesions or ulceration noted.  The dentition are in good repair.  The tongue is mobile and midline.  Ears - Examination of the ears showed- Right canal clear. Right TM is intact without effusion or perforation. LEFT Ear examined under otologic microscopy.  There is dried debris along inferior floor of canal removed with alligator forceps. Scant otorrhea by TM.   Left TM is with perforation, central anterior about 15%. ME appears healthy   Eyes - Extraocular movements intact, and the pupils were reactive to light.  Sclera were not icteric or injected, conjunctiva were pink and moist.  Mouth - Examination of the oral cavity showed pink, healthy oral mucosa. No lesions or ulcerations noted.  The tongue was mobile and midline, and the dentition were in good condition.    Throat - The walls of the oropharynx were smooth, pink, moist, symmetric, and had no lesions or ulcerations.  The tonsillar pillars and soft palate were symmetric.  The uvula was midline on elevation.    Neck - Normal midline excursion of the laryngotracheal complex during swallowing.  Full range of motion on passive movement.  Palpation of the occipital, submental, submandibular, internal jugular chain, and supraclavicular nodes did not demonstrate any " abnormal lymph nodes or masses.  Palpation of the thyroid was soft and smooth, with no nodules or goiter appreciated.  The trachea was mobile and midline.           ASSESSMENT/ PLAN:            ICD-10-CM    1. Perforation of tympanic membrane, left  H72.92       2. H/O myringotomy  Z98.890       3. Sensorineural hearing loss (SNHL) of right ear with restricted hearing of left ear  H90.A21       4. Mixed conductive and sensorineural hearing loss of left ear with restricted hearing of right ear  H90.A32           Ears were cleaned. No otorrhea on exam today.   Stable ear exam.   Left TM with stable perforation. Water precautions.      Follow up in 6 months    Floxin drops provided VENKATESH Ly PA-C  ENT  [facility], [place]

## 2025-04-16 ENCOUNTER — OFFICE VISIT (OUTPATIENT)
Dept: OTOLARYNGOLOGY | Facility: OTHER | Age: 75
End: 2025-04-16
Attending: PHYSICIAN ASSISTANT
Payer: MEDICARE

## 2025-04-16 VITALS
SYSTOLIC BLOOD PRESSURE: 132 MMHG | TEMPERATURE: 98.1 F | DIASTOLIC BLOOD PRESSURE: 79 MMHG | HEART RATE: 85 BPM | HEIGHT: 63 IN | WEIGHT: 173.5 LBS | BODY MASS INDEX: 30.74 KG/M2 | OXYGEN SATURATION: 98 % | RESPIRATION RATE: 16 BRPM

## 2025-04-16 DIAGNOSIS — H90.A21 SENSORINEURAL HEARING LOSS (SNHL) OF RIGHT EAR WITH RESTRICTED HEARING OF LEFT EAR: ICD-10-CM

## 2025-04-16 DIAGNOSIS — H72.92 PERFORATION OF TYMPANIC MEMBRANE, LEFT: Primary | ICD-10-CM

## 2025-04-16 DIAGNOSIS — H90.A32 MIXED CONDUCTIVE AND SENSORINEURAL HEARING LOSS OF LEFT EAR WITH RESTRICTED HEARING OF RIGHT EAR: ICD-10-CM

## 2025-04-16 DIAGNOSIS — Z98.890 H/O MYRINGOTOMY: ICD-10-CM

## 2025-04-16 PROCEDURE — 92504 EAR MICROSCOPY EXAMINATION: CPT | Performed by: PHYSICIAN ASSISTANT

## 2025-04-16 PROCEDURE — G0463 HOSPITAL OUTPT CLINIC VISIT: HCPCS

## 2025-04-16 ASSESSMENT — PAIN SCALES - GENERAL: PAINLEVEL_OUTOF10: NO PAIN (0)

## 2025-04-16 NOTE — LETTER
4/16/2025      Carmelita Watts  [address] [facility] [address]      Dear Colleague,    Thank you for referring your patient, Carmelita Watts, to the [facility]. Please see a copy of my visit note below.    Chief Complaint   Patient presents with     Ear Problem     Follow Up     Follow up ears       Patient returns to ENT.  Patient was last seen 4/16/2024 at that time was doing well.  Her ears were cleaned. She will use drops intermittently at times but it does help her symptoms.      Today, Carmelita has been doing well overall.   Her ear has been overall fairly stable and feels there is some improvement with massage.     Hearing has been stable, hearing aids on daily basis.   She has felt better with right HA use, than left. No recent drainage.   She has felt some discomfort with wearing her left aid. She has been using her left aid more outside of home.      Denies otalgia, otorrhea  Denies worrisome tinnitus  Denies fluctuating hearing loss or tinnitus.   Denies vertigo or facial paraesthesia.    Right tube has been absent and right TM intact without effusion.  Left TM does have small perforation noted.   BTTI placed on 9/20/19 by Dr. Ley in office           Past Medical History:   Diagnosis Date     Arthritis      Cyst of breast      Depressive disorder      Diabetes mellitus, type 2 (H) 01/18/2021     Essential hypertension 10/01/2015     Major depressive disorder, recurrent episode, mild 10/01/2015     Mixed hyperlipidemia 7/5/2013     Multiple myeloma not having achieved remission (H) 06/24/2019     Other specified disorders of bladder 07/09/2012    Irritable Bladder     Seasonal allergies 10/01/2015     Unspecified essential hypertension 03/19/2007     Unspecified sinusitis (chronic) 09/05/2007        Allergies   Allergen Reactions     Lisinopril Cough     Phenylephrine Hcl Other (See Comments)     **Entex  HEADACHE (SEVERE)     Phenylpropanolamine Other (See Comments)      "**Entex  HEADACHE (SEVERE)     Pseudoephedrine Tannate Other (See Comments)     **Entex  HEADACHE (SEVERE)     Levofloxacin Rash     **Levaquin     Moxifloxacin Hcl [Moxifloxacin Hcl In Nacl] Rash     ROS- SEE HPI  /79 (BP Location: Left arm, Patient Position: Sitting, Cuff Size: Adult Large)   Pulse 85   Temp 98.1  F (36.7  C) (Tympanic)   Resp 16   Ht 1.6 m (5' 2.99\")   Wt 78.7 kg (173 lb 8 oz)   SpO2 98%   BMI 30.74 kg/m      General - The patient is well nourished and well developed, and appears to have good nutritional status.    Head and Face - Normocephalic and atraumatic, with no gross asymmetry noted of the contour of the facial features.  The facial nerve is intact, with strong symmetric movements.  Eyes - Extraocular movements intact, and the pupils were reactive to light.  Sclera were not icteric or injected, conjunctiva were pink and moist.  Mouth - Examination of the oral cavity shows pink, healthy, moist mucosa.  No lesions or ulceration noted.  The dentition are in good repair.  The tongue is mobile and midline.  Ears - Examination of the ears showed- Right canal clear. Right TM is intact without effusion or perforation. LEFT Ear examined under otologic microscopy.  There is dried debris along inferior floor of canal removed with alligator forceps. Scant otorrhea by TM.   Left TM is with perforation, central anterior about 15%. ME appears healthy   Eyes - Extraocular movements intact, and the pupils were reactive to light.  Sclera were not icteric or injected, conjunctiva were pink and moist.  Mouth - Examination of the oral cavity showed pink, healthy oral mucosa. No lesions or ulcerations noted.  The tongue was mobile and midline, and the dentition were in good condition.    Throat - The walls of the oropharynx were smooth, pink, moist, symmetric, and had no lesions or ulcerations.  The tonsillar pillars and soft palate were symmetric.  The uvula was midline on elevation.    Neck - " Normal midline excursion of the laryngotracheal complex during swallowing.  Full range of motion on passive movement.  Palpation of the occipital, submental, submandibular, internal jugular chain, and supraclavicular nodes did not demonstrate any abnormal lymph nodes or masses.  Palpation of the thyroid was soft and smooth, with no nodules or goiter appreciated.  The trachea was mobile and midline.           ASSESSMENT/ PLAN:            ICD-10-CM    1. Perforation of tympanic membrane, left  H72.92       2. H/O myringotomy  Z98.890       3. Sensorineural hearing loss (SNHL) of right ear with restricted hearing of left ear  H90.A21       4. Mixed conductive and sensorineural hearing loss of left ear with restricted hearing of right ear  H90.A32           Ears were cleaned. No otorrhea on exam today.   Stable ear exam.   Left TM with stable perforation. Water precautions.      Follow up in 6 months    Floxin drops provided PRN     Bridgette Ly PA-C  ENT  [facility], [place]      Again, thank you for allowing me to participate in the care of your patient.        Sincerely,        Bridgette Ly PA-C    Electronically signed

## 2025-04-16 NOTE — PATIENT INSTRUCTIONS
Stable ear exam.   Continue with water precautions for left ear.   Continue with hearing aid use.   Follow up in 6 months.       Thank you for allowing VENKATESH Purvis and our ENT team to participate in your care.  If your medications are too expensive, please give the nurse a call.  We can possibly change this medication.  If you have a scheduling or an appointment question please contact our Health Unit Coordinator at their direct line [phone].   ALL nursing questions or concerns can be directed to your ENT nurseManuela at: [phone].

## 2025-05-05 ENCOUNTER — LAB (OUTPATIENT)
Dept: LAB | Facility: OTHER | Age: 75
End: 2025-05-05
Payer: MEDICARE

## 2025-05-05 ENCOUNTER — TELEPHONE (OUTPATIENT)
Dept: ONCOLOGY | Facility: CLINIC | Age: 75
End: 2025-05-05
Payer: COMMERCIAL

## 2025-05-05 DIAGNOSIS — C90.00 MULTIPLE MYELOMA NOT HAVING ACHIEVED REMISSION (H): Primary | ICD-10-CM

## 2025-05-05 DIAGNOSIS — C90.00 MULTIPLE MYELOMA NOT HAVING ACHIEVED REMISSION (H): ICD-10-CM

## 2025-05-05 LAB
ALBUMIN SERPL BCG-MCNC: 3.9 G/DL (ref 3.5–5.2)
ALP SERPL-CCNC: 146 U/L (ref 40–150)
ALT SERPL W P-5'-P-CCNC: 13 U/L (ref 0–50)
ANION GAP SERPL CALCULATED.3IONS-SCNC: 13 MMOL/L (ref 7–15)
AST SERPL W P-5'-P-CCNC: 14 U/L (ref 0–45)
BASOPHILS # BLD AUTO: 0.1 10E3/UL (ref 0–0.2)
BASOPHILS NFR BLD AUTO: 1 %
BILIRUB SERPL-MCNC: 0.4 MG/DL
BUN SERPL-MCNC: 17.6 MG/DL (ref 8–23)
CALCIUM SERPL-MCNC: 9.3 MG/DL (ref 8.8–10.4)
CHLORIDE SERPL-SCNC: 102 MMOL/L (ref 98–107)
CREAT SERPL-MCNC: 0.93 MG/DL (ref 0.51–0.95)
EGFRCR SERPLBLD CKD-EPI 2021: 63 ML/MIN/1.73M2
EOSINOPHIL # BLD AUTO: 0.6 10E3/UL (ref 0–0.7)
EOSINOPHIL NFR BLD AUTO: 12 %
ERYTHROCYTE [DISTWIDTH] IN BLOOD BY AUTOMATED COUNT: 15.7 % (ref 10–15)
GLUCOSE SERPL-MCNC: 131 MG/DL (ref 70–99)
HCO3 SERPL-SCNC: 22 MMOL/L (ref 22–29)
HCT VFR BLD AUTO: 37.2 % (ref 35–47)
HGB BLD-MCNC: 12.7 G/DL (ref 11.7–15.7)
IMM GRANULOCYTES # BLD: 0.1 10E3/UL
IMM GRANULOCYTES NFR BLD: 1 %
LYMPHOCYTES # BLD AUTO: 1.8 10E3/UL (ref 0.8–5.3)
LYMPHOCYTES NFR BLD AUTO: 38 %
MCH RBC QN AUTO: 30.6 PG (ref 26.5–33)
MCHC RBC AUTO-ENTMCNC: 34.1 G/DL (ref 31.5–36.5)
MCV RBC AUTO: 90 FL (ref 78–100)
MONOCYTES # BLD AUTO: 0.7 10E3/UL (ref 0–1.3)
MONOCYTES NFR BLD AUTO: 15 %
NEUTROPHILS # BLD AUTO: 1.6 10E3/UL (ref 1.6–8.3)
NEUTROPHILS NFR BLD AUTO: 33 %
NRBC # BLD AUTO: 0 10E3/UL
NRBC BLD AUTO-RTO: 0 /100
PLATELET # BLD AUTO: 160 10E3/UL (ref 150–450)
POTASSIUM SERPL-SCNC: 4.1 MMOL/L (ref 3.4–5.3)
PROT SERPL-MCNC: 6.6 G/DL (ref 6.4–8.3)
RBC # BLD AUTO: 4.15 10E6/UL (ref 3.8–5.2)
SODIUM SERPL-SCNC: 137 MMOL/L (ref 135–145)
TOTAL PROTEIN SERUM FOR ELP: 6.3 G/DL (ref 6.4–8.3)
WBC # BLD AUTO: 4.7 10E3/UL (ref 4–11)

## 2025-05-05 PROCEDURE — 82232 ASSAY OF BETA-2 PROTEIN: CPT | Mod: ZL

## 2025-05-05 PROCEDURE — 86334 IMMUNOFIX E-PHORESIS SERUM: CPT | Mod: 26 | Performed by: PATHOLOGY

## 2025-05-05 PROCEDURE — 85810 BLOOD VISCOSITY EXAMINATION: CPT | Mod: ZL

## 2025-05-05 PROCEDURE — 86334 IMMUNOFIX E-PHORESIS SERUM: CPT | Mod: ZL | Performed by: STUDENT IN AN ORGANIZED HEALTH CARE EDUCATION/TRAINING PROGRAM

## 2025-05-05 PROCEDURE — 84165 PROTEIN E-PHORESIS SERUM: CPT | Mod: 26 | Performed by: PATHOLOGY

## 2025-05-05 PROCEDURE — 83521 IG LIGHT CHAINS FREE EACH: CPT | Mod: ZL

## 2025-05-05 PROCEDURE — 82784 ASSAY IGA/IGD/IGG/IGM EACH: CPT | Mod: ZL

## 2025-05-05 PROCEDURE — 84155 ASSAY OF PROTEIN SERUM: CPT | Mod: ZL

## 2025-05-05 PROCEDURE — 82435 ASSAY OF BLOOD CHLORIDE: CPT | Mod: ZL

## 2025-05-05 PROCEDURE — 85025 COMPLETE CBC W/AUTO DIFF WBC: CPT | Mod: ZL

## 2025-05-05 PROCEDURE — 36415 COLL VENOUS BLD VENIPUNCTURE: CPT | Mod: ZL

## 2025-05-05 PROCEDURE — 84165 PROTEIN E-PHORESIS SERUM: CPT | Mod: ZL | Performed by: STUDENT IN AN ORGANIZED HEALTH CARE EDUCATION/TRAINING PROGRAM

## 2025-05-05 RX ORDER — DEXAMETHASONE 4 MG/1
TABLET ORAL
Qty: 15 TABLET | Refills: 0 | Status: SHIPPED | OUTPATIENT
Start: 2025-05-05

## 2025-05-05 NOTE — TELEPHONE ENCOUNTER
Oral Chemotherapy Monitoring Program     Medication: Pomalyst  Rx: 1mg PO daily on days 1 through 21 of 28 day cycle   REMS AUTH  [id number]  Routine survey questions reviewed  Rx to be Escribed to [facility]

## 2025-05-06 ENCOUNTER — DOCUMENTATION ONLY (OUTPATIENT)
Dept: PHARMACY | Facility: CLINIC | Age: 75
End: 2025-05-06
Payer: COMMERCIAL

## 2025-05-06 LAB
B2 MICROGLOB TUMOR MARKER SER-MCNC: 2.9 MG/L
IGA SERPL-MCNC: 38 MG/DL (ref 84–499)
IGG SERPL-MCNC: 572 MG/DL (ref 610–1616)
IGM SERPL-MCNC: 47 MG/DL (ref 35–242)
KAPPA LC FREE SER-MCNC: 0.7 MG/DL (ref 0.33–1.94)
KAPPA LC FREE/LAMBDA FREE SER NEPH: 1.23 {RATIO} (ref 0.26–1.65)
LAMBDA LC FREE SERPL-MCNC: 0.57 MG/DL (ref 0.57–2.63)
VISC SER: 1.5 CP (ref 1.4–1.8)

## 2025-05-06 NOTE — PROGRESS NOTES
Pharmacist IVIG Stewardship Program    Diagnosis: Hypogammaglobulinemia   Date  12/2/2024   IgG Level (mg/dL) 375     Current Dosing Regimen: Privigen 20g IV every 4 weeks PRN for IgG <600 mg/dL   Patient is dosed as needed based on an IgG level of less than 600 mg/dL to ensure the lowest amount of medication is administered to achieve beneficial outcomes.  Pre-medications:   Acetaminophen 650 mg by mouth, 30 minutes prior to infusion  Diphenhydramine 50 mg by mouth, 30 minutes prior to infusion  Previously Tried Products: None     Side Effects: Unable to reach patient to discuss.     Interventions: Lab review completed.     Assessment:   Date  5/5/2025   IgG Level (mg/dL) 572   Patient is appropriate for IVIG therapy when their level is within parameter. IVIG infusions are effective in increasing IgG levels to an appropriate level to minimize patient's risk of infection. Patient should continue on treatment as they have been per provider order. Without therapy their levels would put them at an increased risk of infections.    Plan: Patient is okay to proceed with IVIG infusion as her level is below 600 mg/dL. Care team has been notified that patient is okay to be scheduled. Patient will have additional labs drawn next month to assess the need for ongoing infusions. Patient should not have levels drawn on the same day as IVIG infusion.      Next Review Needed: 6/2025    Karen Menon, PharmD, IgCP  Medication Access Pharmacist

## 2025-05-07 LAB
ALBUMIN SERPL ELPH-MCNC: 3.8 G/DL (ref 3.7–5.1)
ALPHA1 GLOB SERPL ELPH-MCNC: 0.4 G/DL (ref 0.2–0.4)
ALPHA2 GLOB SERPL ELPH-MCNC: 0.9 G/DL (ref 0.5–0.9)
B-GLOBULIN SERPL ELPH-MCNC: 0.7 G/DL (ref 0.6–1)
GAMMA GLOB SERPL ELPH-MCNC: 0.6 G/DL (ref 0.7–1.6)
LOCATION OF TASK: ABNORMAL
LOCATION OF TASK: NORMAL
M PROTEIN SERPL ELPH-MCNC: 0 G/DL
PROT PATTERN SERPL ELPH-IMP: ABNORMAL
PROT PATTERN SERPL IFE-IMP: NORMAL

## 2025-05-09 ENCOUNTER — RESULTS FOLLOW-UP (OUTPATIENT)
Dept: ONCOLOGY | Facility: OTHER | Age: 75
End: 2025-05-09

## 2025-05-12 ENCOUNTER — ONCOLOGY VISIT (OUTPATIENT)
Dept: ONCOLOGY | Facility: OTHER | Age: 75
End: 2025-05-12
Attending: NURSE PRACTITIONER
Payer: MEDICARE

## 2025-05-12 ENCOUNTER — LAB (OUTPATIENT)
Dept: LAB | Facility: OTHER | Age: 75
End: 2025-05-12
Attending: NURSE PRACTITIONER
Payer: MEDICARE

## 2025-05-12 VITALS
DIASTOLIC BLOOD PRESSURE: 62 MMHG | WEIGHT: 170.86 LBS | SYSTOLIC BLOOD PRESSURE: 128 MMHG | OXYGEN SATURATION: 96 % | HEIGHT: 63 IN | HEART RATE: 87 BPM | TEMPERATURE: 98.5 F | BODY MASS INDEX: 30.27 KG/M2

## 2025-05-12 DIAGNOSIS — C90.00 MULTIPLE MYELOMA NOT HAVING ACHIEVED REMISSION (H): Primary | ICD-10-CM

## 2025-05-12 DIAGNOSIS — D80.1 HYPOGAMMAGLOBULINEMIA: ICD-10-CM

## 2025-05-12 DIAGNOSIS — M89.9 BONE LESION: ICD-10-CM

## 2025-05-12 PROCEDURE — G0463 HOSPITAL OUTPT CLINIC VISIT: HCPCS

## 2025-05-12 PROCEDURE — 36415 COLL VENOUS BLD VENIPUNCTURE: CPT | Mod: ZL

## 2025-05-12 RX ORDER — MEPERIDINE HYDROCHLORIDE 25 MG/ML
25 INJECTION INTRAMUSCULAR; INTRAVENOUS; SUBCUTANEOUS EVERY 30 MIN PRN
Status: CANCELLED | OUTPATIENT
Start: 2025-05-12

## 2025-05-12 RX ORDER — LORAZEPAM 2 MG/ML
0.5 INJECTION INTRAMUSCULAR EVERY 4 HOURS PRN
Status: CANCELLED | OUTPATIENT
Start: 2025-05-12

## 2025-05-12 RX ORDER — DEXAMETHASONE SODIUM PHOSPHATE 10 MG/ML
20 INJECTION, SOLUTION INTRAMUSCULAR; INTRAVENOUS ONCE
Status: CANCELLED
Start: 2025-05-12 | End: 2025-05-12

## 2025-05-12 RX ORDER — ACETAMINOPHEN 325 MG/1
650 TABLET ORAL
Status: CANCELLED | OUTPATIENT
Start: 2025-05-12

## 2025-05-12 RX ORDER — ALBUTEROL SULFATE 90 UG/1
1-2 INHALANT RESPIRATORY (INHALATION)
Status: CANCELLED
Start: 2025-05-12

## 2025-05-12 RX ORDER — METHYLPREDNISOLONE SODIUM SUCCINATE 125 MG/2ML
125 INJECTION INTRAMUSCULAR; INTRAVENOUS
Status: CANCELLED
Start: 2025-05-12

## 2025-05-12 RX ORDER — DIPHENHYDRAMINE HYDROCHLORIDE 50 MG/ML
50 INJECTION, SOLUTION INTRAMUSCULAR; INTRAVENOUS
Status: CANCELLED
Start: 2025-05-12

## 2025-05-12 RX ORDER — DIPHENHYDRAMINE HCL 50 MG
50 CAPSULE ORAL ONCE
Status: CANCELLED | OUTPATIENT
Start: 2025-05-12

## 2025-05-12 RX ORDER — EPINEPHRINE 1 MG/ML
0.3 INJECTION, SOLUTION, CONCENTRATE INTRAVENOUS EVERY 5 MIN PRN
Status: CANCELLED | OUTPATIENT
Start: 2025-05-12

## 2025-05-12 RX ORDER — DIPHENHYDRAMINE HCL 50 MG
50 CAPSULE ORAL
Status: CANCELLED | OUTPATIENT
Start: 2025-05-12

## 2025-05-12 RX ORDER — DEXAMETHASONE 4 MG/1
20 TABLET ORAL ONCE
Status: CANCELLED | OUTPATIENT
Start: 2025-05-12

## 2025-05-12 RX ORDER — ALBUTEROL SULFATE 0.83 MG/ML
2.5 SOLUTION RESPIRATORY (INHALATION)
Status: CANCELLED | OUTPATIENT
Start: 2025-05-12

## 2025-05-12 RX ORDER — MONTELUKAST SODIUM 10 MG/1
10 TABLET ORAL ONCE
Status: CANCELLED | OUTPATIENT
Start: 2025-05-12

## 2025-05-12 RX ORDER — DEXAMETHASONE 4 MG/1
TABLET ORAL
Qty: 15 TABLET | Refills: 3 | Status: SHIPPED | OUTPATIENT
Start: 2025-05-12

## 2025-05-12 RX ORDER — ACETAMINOPHEN 325 MG/1
650 TABLET ORAL ONCE
Status: CANCELLED | OUTPATIENT
Start: 2025-05-12

## 2025-05-12 ASSESSMENT — PAIN SCALES - GENERAL: PAINLEVEL_OUTOF10: NO PAIN (0)

## 2025-05-12 NOTE — NURSING NOTE
"Oncology Rooming Note    May 12, 2025 2:39 PM   Carmelita Watts is a 74 year old female who presents for:    Chief Complaint   Patient presents with    Oncology Clinic Visit     Follow up. Multiple myeloma not having achieved remission      Initial Vitals: /62 (BP Location: Right arm, Patient Position: Sitting, Cuff Size: Adult Regular)   Pulse 87   Temp 98.5  F (36.9  C) (Tympanic)   Ht 1.6 m (5' 3\")   Wt 77.5 kg (170 lb 13.7 oz)   SpO2 96%   BMI 30.27 kg/m   Estimated body mass index is 30.27 kg/m  as calculated from the following:    Height as of this encounter: 1.6 m (5' 3\").    Weight as of this encounter: 77.5 kg (170 lb 13.7 oz). Body surface area is 1.86 meters squared.  No Pain (0) Comment: Data Unavailable   No LMP recorded. Patient is postmenopausal.  Allergies reviewed: Yes  Medications reviewed: Yes    Medications: Medication refills not needed today.  Pharmacy name entered into [employer]:    [id number] - [place], [place] - [address] [facility] [place] [facility] [address]  [facility] - [place], [place] - [address]    Frailty Screening:   Is the patient here for a new oncology consult visit in cancer care? 2. No    PHQ9:  Did this patient require a PHQ9?: No      Clinical concerns: none       Julia See LPN              "

## 2025-05-12 NOTE — PROGRESS NOTES
Oncology Follow-up Visit    Reason for Visit:  Carmelita is a 74 year old woman with a diagnosis of multiple myeloma, who I am visiting with today for routine follow-up and consideration of ongoing therapy.     Nursing Note and documentation reviewed: Yes    Interval History:   Carlene notes that she is doing well. No big concerns today. Notes that she did have a couple episodes of diarrhea, was even incontinent x1. Took imodium. No remaining issues. Was tired that day. Otherwise no fever, chills, chest pain, or cough. Some VILLASENOR with activity. No bleeding concerns. Intermittent nausea, no vomiting. At times will use compazine. No skin concerns. Energy varies. Stays active but rests or takes a nap PRN.     Oncologic History  12/31/2018 Presented to the emergency room with vertigo and fatigue.  CT scan of the head was negative and subsequent stress test was negative.  05/03/2019 PCP ordered lab work and noted a total protein of 12.9.  SPEP at that time showed an M spike of 6.2 with a large monoclonal protein seen in the gamma fraction. Urine immunofixation showed a possible small protein band in the gamma fraction  05/31/2019 Evaluated by Dr. Walker with Medical Oncology with plan to rule out myeloma and obtain bone marrow aspiration biopsy as well as a metastatic bone survey along with additional labwork  06/18/2019 Underwent bone marrow aspiration and biopsy  06/24/2019 Seen again by Dr. Walker and CBC showed a hemoglobin of 9.3, M spike 7.3 with monoclonal IgG immunoglobulin of kappa light chain type; serum viscosity was 2.9; quantitative immunoglobulins showed an IgG of 8160, beta-2 microglobulin was 5.8, BUN was 21 with creatinine is 0.8 and total protein was 13.7.  Quantitative kappa/lambda free light chains showed an elevated ratio of 17.0 bone marrow aspiration biopsy showed plasma cell myeloma with approximately 80% plasma cells.  Immunofixation showed IgG kappa and flow cytometry revealed kappa monotypic plasma  cells consistent with clonal plasma cell neoplasm and FISH panel was pending at that time. It was felt she had at least stage II disease based on her beta-2 microglobulin and anemia. Plan was to treat with Velcade 1.3 mg per/m2 days 1, 4, 8 and 11/Decadron 40 mg on days 1, 8 and 15. Initiation of Revlimid with C2 at 25 mg daily days 1 through 14.  Plan was to also obtain an MRI of the lumbar spine to rule out lytic lesion at L3.  She was initiated on Zovirax 400 mg p.o. twice daily.  06/25/2019 C1 of chemotherapy initiated  07/01/2019 Note in chart regarding patient's large co-pay for the Revlimid and no plan at this point to initiate Revlimid and treat only with Velcade and Decadron per Dr. Walker  07/11/2019 MRI lumbar spine shows a pathologic superior endplate compression fracture at L3 without evidence of retropulsed fragment and innumerable enhancing lesions throughout the lumbar spine consistent with history of multiple myeloma.  09/10/2019 Increasing M-spike and kappa lambda ratio  10/01/2019 Initiation of CyBorD  11/17/2020 Plateau of M-spike at 1.2 after 36 cycles of CyBorD  12/01/2020 Initiation of Darzalex Faspro injection  03/23/2021 M-spike increased form 1.0 to 1.2 and velcade and dexamethasone added to plan  04/12/2022 Treatment HOLIDAY commenced with Mspike 0.3 after 22 cycles of treatent  05/27/2022 Received Evusheld while on treatment holiday.   07/18/2023 Mspike 0.8. Other labs stable. PET scan ordered.   08/02/2023 PET completed showing negative study. No evidence of mass or lymphadenopathy. No concerning hypermetabolism is present.  08/14/2023 Met with Dr. Walker. Given biochemical progression, he did feel appropriate to resume therapy again with Darzalex faspro  03/12/2024 Following C7, mspike magy from 0.2-->0.8. PET ordered which was negative. Again increased frequency of Darzalex with anticipation of adding in Pomalyst with C2.    04/29/2024 Addition of Pomalyst with C2  12/16/2024 IVIG  "added for suppressed IgG levels and increased infections    Current Chemo Regime/TX: Darzalex faspro injection 1800mg subcutaneous per protocol and Pomalyst 1 mg days 1-21 of 28 day cycle given with weekly Dex  Current Cycle: C15   Completed cycles: 14  **Pom added C2 (4/29)     Previous treatment:     Velcade 1.3 mg/m2 days 1, 4, 8 and 11 with Decadron 40 mg days 1, 8 and 15 x 4 cycles;     Velcade 1.5mg.m2, cyclophosphamide 150mg every 7 days on days 1,8,15 and 22 with decadron 40mg days 1,8,15,22  Darzalex faspro injection 1800mg subcutaneous per protocol    Past Medical History:   Diagnosis Date    Arthritis     Cyst of breast     Depressive disorder     Diabetes mellitus, type 2 (H) 01/18/2021    Essential hypertension 10/01/2015    Major depressive disorder, recurrent episode, mild 10/01/2015    Mixed hyperlipidemia 7/5/2013    Multiple myeloma not having achieved remission (H) 06/24/2019    Other specified disorders of bladder 07/09/2012    Irritable Bladder    Seasonal allergies 10/01/2015    Unspecified essential hypertension 03/19/2007    Unspecified sinusitis (chronic) 09/05/2007       Past Surgical History:   Procedure Laterality Date    APPENDECTOMY      BONE MARROW BIOPSY, BONE SPECIMEN, NEEDLE/TROCAR N/A 06/18/2019    Procedure: BONE MARROW BIOPSY;  Surgeon: Maciej Sanz MD;  Location: [facility] OR    CHOLECYSTECTOMY      COLONOSCOPY  07/18/2006    repeat 10 years    COLONOSCOPY N/A 12/30/2016    Procedure: COLONOSCOPY;  Surgeon: Bhaskar Franklin DO;  Location: [facility] OR    PUNC/ASPIR BREAST CYST Left 1995    benign    SINUS SURGERY      TUBAL STERILIZATION         Family History   Problem Relation Age of Onset    Breast Cancer Mother 60        Cause of Death; 1 breast    Parkinsonism Father         \"Possible\"    Coronary Artery Disease Father     Pacemaker Father     Thyroid Disease Daughter     Breast Cancer Maternal Aunt         over 50    Diabetes No family hx of     Hypertension No family hx of     " Hyperlipidemia No family hx of     Cerebrovascular Disease No family hx of     Colon Cancer No family hx of     Prostate Cancer No family hx of     Genetic Disorder No family hx of     Asthma No family hx of     Anesthesia Reaction No family hx of        Social History     Socioeconomic History    Marital status:      Spouse name: Not on file    Number of children: Not on file    Years of education: Not on file    Highest education level: Not on file   Occupational History    Occupation: Financial     Comment:  - (FT)   Tobacco Use    Smoking status: Never    Smokeless tobacco: Never    Tobacco comments:     Tried to Quit (YES); QUIT in 1971; Passive Exposure (NO)   Vaping Use    Vaping status: Never Used   Substance and Sexual Activity    Alcohol use: Yes     Comment: RARELY    Drug use: No    Sexual activity: Yes     Partners: Male     Birth control/protection: None   Other Topics Concern     Service Not Asked    Blood Transfusions Not Asked    Caffeine Concern Yes     Comment: Coffee >6 cups daily    Occupational Exposure Not Asked    Hobby Hazards Not Asked    Sleep Concern Not Asked    Stress Concern Not Asked    Weight Concern Not Asked    Special Diet Not Asked    Back Care Not Asked    Exercise Not Asked    Bike Helmet Not Asked    Seat Belt Not Asked    Self-Exams Not Asked    Parent/sibling w/ CABG, MI or angioplasty before 65F 55M? No   Social History Narrative    Not on file     Social Drivers of Health     Financial Resource Strain: Low Risk  (1/7/2025)    Financial Resource Strain     Within the past 12 months, have you or your family members you live with been unable to get utilities (heat, electricity) when it was really needed?: No   Food Insecurity: Low Risk  (1/7/2025)    Food Insecurity     Within the past 12 months, did you worry that your food would run out before you got money to buy more?: No     Within the past 12 months, did the food you bought just not last  and you didn t have money to get more?: No   Transportation Needs: Low Risk  (1/7/2025)    Transportation Needs     Within the past 12 months, has lack of transportation kept you from medical appointments, getting your medicines, non-medical meetings or appointments, work, or from getting things that you need?: No   Physical Activity: Not on file   Stress: Not on file   Social Connections: Not on file   Interpersonal Safety: Low Risk  (5/12/2025)    Interpersonal Safety     Do you feel physically and emotionally safe where you currently live?: Yes     Within the past 12 months, have you been hit, slapped, kicked or otherwise physically hurt by someone?: No     Within the past 12 months, have you been humiliated or emotionally abused in other ways by your partner or ex-partner?: No   Housing Stability: Low Risk  (1/7/2025)    Housing Stability     Do you have housing? : Yes     Are you worried about losing your housing?: No       Current Outpatient Medications   Medication Sig Dispense Refill    acyclovir (ZOVIRAX) 400 MG tablet Take 1 tablet (400 mg) by mouth 2 times daily. Take one tablet my mouth twice daily 60 tablet 1    aspirin (ASA) 81 MG chewable tablet Take 81 mg by mouth daily      atorvastatin (LIPITOR) 20 MG tablet TAKE 1 TABLET BY MOUTH DAILY 90 tablet 3    cetirizine (ZYRTEC) 10 MG tablet Take 1 tablet (10 mg) by mouth daily 30 tablet 1    dexAMETHasone (DECADRON) 4 MG tablet Take 5 tablets (20 mg) on Days 8, 15, and 22. 15 tablet 3    diphenhydrAMINE (BENADRYL) 25 MG capsule Take 25 mg by mouth daily.      fluticasone (FLONASE) 50 MCG/ACT nasal spray SPRAY 2 SPRAYS INTO BOTH NOSTRILS DAILY 48 mL 0    hydrocortisone (CORTAID) 1 % external cream APPLY SPARINGLY OVER RASH TO FACE AND CHEST TOPICALLY TWICE DAILY 45 g 0    L-Lysine HCl 500 MG CAPS Take by mouth daily.      loperamide (IMODIUM) 2 MG capsule Take 2 mg by mouth 4 times daily as needed for diarrhea.      losartan (COZAAR) 50 MG tablet TAKE 1  "TABLET BY MOUTH DAILY 90 tablet 3    pomalidomide (POMALYST) 1 MG capsule Take 1 capsule (1 mg) by mouth daily for 21 days Day 1 thru 21 of each 28 day cycle. Swallow whole with water. 21 capsule 0    prochlorperazine (COMPAZINE) 10 MG tablet Take 1 tablet (10 mg) by mouth every 6 hours as needed for nausea or vomiting 30 tablet 2    sertraline (ZOLOFT) 50 MG tablet TAKE 1/2 TABLET BY MOUTH DAILY 45 tablet 3     No current facility-administered medications for this visit.        Allergies   Allergen Reactions    Lisinopril Cough    Phenylephrine Hcl Other (See Comments)     **Entex  HEADACHE (SEVERE)    Phenylpropanolamine Other (See Comments)     **Entex  HEADACHE (SEVERE)    Pseudoephedrine Tannate Other (See Comments)     **Entex  HEADACHE (SEVERE)    Levofloxacin Rash     **Levaquin    Moxifloxacin Hcl [Moxifloxacin Hcl In Nacl] Rash       Review Of Systems:  A complete review of systems is negative except for the above mentioned items in the interval history.     ECOG PS: 0    Physical Exam:  /62 (BP Location: Right arm, Patient Position: Sitting, Cuff Size: Adult Regular)   Pulse 87   Temp 98.5  F (36.9  C) (Tympanic)   Ht 1.6 m (5' 3\")   Wt 77.5 kg (170 lb 13.7 oz)   SpO2 96%   BMI 30.27 kg/m    GENERAL APPEARANCE: Healthy, alert and in no acute distress.  HEENT: Eyes appear normal without scleral icterus. Extraocular movements intact.   NECK:   Supple with normal range of motion. No asymmetry or masses.  RESP: Lungs clear to auscultation bilaterally, respirations regular and easy.  CARDIOVASCULAR: Regular rate and rhythm. Normal S1, S2; no murmur, gallop, or rub.  MUSCULOSKELETAL: Extremities without gross deformities noted. No edema of bilateral lower extremities.  SKIN: No suspicious lesions or rashes.  NEURO: Alert and oriented x 3.  Gait steady.  PSYCHIATRIC: Mentation and affect appear normal.  Mood appropriate.      Laboratory:  Component      Latest Ref Rng 5/5/2025  10:36 AM   WBC      " 4.0 - 11.0 10e3/uL 4.7    RBC Count      3.80 - 5.20 10e6/uL 4.15    Hemoglobin      11.7 - 15.7 g/dL 12.7    Hematocrit      35.0 - 47.0 % 37.2    MCV      78 - 100 fL 90    MCH      26.5 - 33.0 pg 30.6    MCHC      31.5 - 36.5 g/dL 34.1    RDW      10.0 - 15.0 % 15.7 (H)    Platelet Count      150 - 450 10e3/uL 160    % Neutrophils      % 33    % Lymphocytes      % 38    % Monocytes      % 15    % Eosinophils      % 12    % Basophils      % 1    % Immature Granulocytes      % 1    NRBC/W      <1 /100 0    Absolute Neutrophil      1.6 - 8.3 10e3/uL 1.6    Absolute Lymphocytes      0.8 - 5.3 10e3/uL 1.8    Absolute Monocytes      0.0 - 1.3 10e3/uL 0.7    Absolute Eosinophils      0.0 - 0.7 10e3/uL 0.6    Absolute Basophils      0.0 - 0.2 10e3/uL 0.1    Absolute Immature Granulocytes      <=0.4 10e3/uL 0.1    Absolute NRBCs      10e3/uL 0.0    Sodium      135 - 145 mmol/L 137    Potassium      3.4 - 5.3 mmol/L 4.1    Carbon Dioxide (CO2)      22 - 29 mmol/L 22    Anion Gap      7 - 15 mmol/L 13    Urea Nitrogen      8.0 - 23.0 mg/dL 17.6    Creatinine      0.51 - 0.95 mg/dL 0.93    GFR Estimate      >60 mL/min/1.73m2 63    Calcium      8.8 - 10.4 mg/dL 9.3    Chloride      98 - 107 mmol/L 102    Glucose      70 - 99 mg/dL 131 (H)    Alkaline Phosphatase      40 - 150 U/L 146    AST      0 - 45 U/L 14    ALT      0 - 50 U/L 13    Protein Total      6.4 - 8.3 g/dL 6.6    Albumin      3.5 - 5.2 g/dL 3.9    Bilirubin Total      <=1.2 mg/dL 0.4    Albumin Fraction      3.7 - 5.1 g/dL 3.8    Alpha 1 Fraction      0.2 - 0.4 g/dL 0.4    Alpha 2 Fraction      0.5 - 0.9 g/dL 0.9    Beta Fraction      0.6 - 1.0 g/dL 0.7    Gamma Fraction      0.7 - 1.6 g/dL 0.6 (L)    Monoclonal Peak      <=0.0 g/dL 0.0    ELP Interpretation: Essentially normal electrophoretic pattern. No monoclonal protein seen. Pathologic significance requires clinical correlation. Clair Lyons M.D., Ph.D.    Signout Location if Remote [facility]    Signout Location  if Remote ABK1    IGG      610 - 1,616 mg/dL 572 (L)    IGA      84 - 499 mg/dL 38 (L)    IGM      35 - 242 mg/dL 47    [place] Free Lt Chain      0.33 - 1.94 mg/dL 0.70    Lambda Free Lt Chain      0.57 - 2.63 mg/dL 0.57    Kappa Lambda Ratio      0.26 - 1.65  1.23    Immunofixation ELP No monoclonal protein seen on immunofixation. Pathologic significance requires clinical correlation.  Clair Lyons M.D., Ph.D. No monoclonal protein seen on immunofixation.    Beta-2-Microglobulin      <2.3 mg/L 2.9 (H)    Viscosity Index      1.4 - 1.8  1.5    Total Protein Serum for ELP      6.4 - 8.3 g/dL 6.3 (L)       Legend:  (H) High  (L) Low    Imaging Studies:  None this visit.     ASSESSMENT/PLAN:  #1 Multiple myeloma IgG kappa multiple myeloma with 80% involvement of the bone marrow diagnosed June 2019 initially treated with Velcade and Decadron and received 4 cycles with increasing M-spike and kappa/lambda ratio.  Treatment changed to CyBorD on 10/1/2019.  M-spike plateau at 1.2 and treatment changed to monotherapy with Darzalex Faspro injection. M spike declined to 1.0 then increased again to 1.2 so Velcade and Dex added to her treatment plan with cycle 5 therapy. Following C22 (April 2022), she did initiate a treatment holiday when her Mspike was 0.3.     We had followed her counts for over a year. Unfortunately, she did see a rise in her mspike to 0.8. PET completed was negative. Dr. Walker felt this was a biochemical progression and after discussion with patient, elected to resume treatment with Darzalex faspro. Mspike began to rise. PET was negative but feeling like she wasn't responding to Darzalex alone, decided to add in Pomalyst 1 mg p.o. daily on days 1 through 21 to begin with cycle 2 in addition to dexamethasone 20 mg p.o. on days 1 ,8,15,22.    Today, doing well. No new concerns. No detectable monoclonal protein. Stable light chains. I see no contraindications to proceeding with therapy tomorrow. She will  continue Darzalex q28 days. Dex weekly. Pom for 21 days starting tomorrow. Will repeat myeloma labs in 3 weeks, I will see her in 4 weeks, sooner with concerns.     #2 Lytic lesions Hx lytic lesion at L3. Zometa Q3M, due July. She remains on calcium with vitamin D twice a day. Weight bearing activity.    #3 Hypogammaglobulinemia Hx decreased IgG levels and recurrent ear infections, Dr. Walker elected to commence IVIG. This is to be given every 4 weeks for IgG levels less than 600. Levels recently 572. Will treat tomorrow.            Patient in agreement with plan and verbalizes understanding. Agrees to call with any questions or concerns.      30 minutes spent in the patient's encounter today with time spent in review of patient's chart along with chart preparation and review of the treatment plan and signing of treatment plan.  Time was also spent with the patient in obtaining a review of systems and performing a physical exam along with detailed review of all test results. Time was also spent in discussing plan for future follow-up and relating instructions for follow-up and in placing future orders.    The longitudinal plan of care for the diagnosis(es)/condition(s) as documented were addressed during this visit. Due to the added complexity in care, I will continue to support Carmelita in the subsequent management and with ongoing continuity of care.        FLO Duque, VAMSIP-C

## 2025-05-13 ENCOUNTER — INFUSION THERAPY VISIT (OUTPATIENT)
Dept: INFUSION THERAPY | Facility: OTHER | Age: 75
End: 2025-05-13
Attending: INTERNAL MEDICINE
Payer: MEDICARE

## 2025-05-13 ENCOUNTER — RESULTS FOLLOW-UP (OUTPATIENT)
Dept: ONCOLOGY | Facility: OTHER | Age: 75
End: 2025-05-13

## 2025-05-13 VITALS
HEIGHT: 63 IN | RESPIRATION RATE: 16 BRPM | OXYGEN SATURATION: 96 % | DIASTOLIC BLOOD PRESSURE: 83 MMHG | SYSTOLIC BLOOD PRESSURE: 129 MMHG | HEART RATE: 69 BPM | WEIGHT: 171.96 LBS | BODY MASS INDEX: 30.47 KG/M2 | TEMPERATURE: 98.3 F

## 2025-05-13 DIAGNOSIS — C90.00 MULTIPLE MYELOMA NOT HAVING ACHIEVED REMISSION (H): Primary | ICD-10-CM

## 2025-05-13 DIAGNOSIS — D80.1 HYPOGAMMAGLOBULINEMIA: ICD-10-CM

## 2025-05-13 LAB
ALBUMIN SERPL BCG-MCNC: 3.7 G/DL (ref 3.5–5.2)
ALP SERPL-CCNC: 126 U/L (ref 40–150)
ALT SERPL W P-5'-P-CCNC: 12 U/L (ref 0–50)
ANION GAP SERPL CALCULATED.3IONS-SCNC: 14 MMOL/L (ref 7–15)
AST SERPL W P-5'-P-CCNC: 13 U/L (ref 0–45)
BASOPHILS # BLD AUTO: 0.1 10E3/UL (ref 0–0.2)
BASOPHILS NFR BLD AUTO: 2 %
BILIRUB SERPL-MCNC: 0.4 MG/DL
BUN SERPL-MCNC: 11.3 MG/DL (ref 8–23)
CALCIUM SERPL-MCNC: 9.4 MG/DL (ref 8.8–10.4)
CHLORIDE SERPL-SCNC: 105 MMOL/L (ref 98–107)
CREAT SERPL-MCNC: 0.85 MG/DL (ref 0.51–0.95)
EGFRCR SERPLBLD CKD-EPI 2021: 71 ML/MIN/1.73M2
EOSINOPHIL # BLD AUTO: 0.3 10E3/UL (ref 0–0.7)
EOSINOPHIL NFR BLD AUTO: 6 %
ERYTHROCYTE [DISTWIDTH] IN BLOOD BY AUTOMATED COUNT: 15.9 % (ref 10–15)
GLUCOSE SERPL-MCNC: 131 MG/DL (ref 70–99)
HCO3 SERPL-SCNC: 21 MMOL/L (ref 22–29)
HCT VFR BLD AUTO: 34.3 % (ref 35–47)
HGB BLD-MCNC: 11.8 G/DL (ref 11.7–15.7)
IMM GRANULOCYTES # BLD: 0 10E3/UL
IMM GRANULOCYTES NFR BLD: 0 %
LYMPHOCYTES # BLD AUTO: 2 10E3/UL (ref 0.8–5.3)
LYMPHOCYTES NFR BLD AUTO: 41 %
MCH RBC QN AUTO: 30.7 PG (ref 26.5–33)
MCHC RBC AUTO-ENTMCNC: 34.4 G/DL (ref 31.5–36.5)
MCV RBC AUTO: 89 FL (ref 78–100)
MONOCYTES # BLD AUTO: 0.7 10E3/UL (ref 0–1.3)
MONOCYTES NFR BLD AUTO: 16 %
NEUTROPHILS # BLD AUTO: 1.7 10E3/UL (ref 1.6–8.3)
NEUTROPHILS NFR BLD AUTO: 35 %
NRBC # BLD AUTO: 0 10E3/UL
NRBC BLD AUTO-RTO: 0 /100
PLATELET # BLD AUTO: 250 10E3/UL (ref 150–450)
POTASSIUM SERPL-SCNC: 3.7 MMOL/L (ref 3.4–5.3)
PROT SERPL-MCNC: 6.2 G/DL (ref 6.4–8.3)
RBC # BLD AUTO: 3.84 10E6/UL (ref 3.8–5.2)
SODIUM SERPL-SCNC: 140 MMOL/L (ref 135–145)
WBC # BLD AUTO: 4.7 10E3/UL (ref 4–11)

## 2025-05-13 PROCEDURE — 85025 COMPLETE CBC W/AUTO DIFF WBC: CPT | Mod: ZL | Performed by: NURSE PRACTITIONER

## 2025-05-13 PROCEDURE — 36415 COLL VENOUS BLD VENIPUNCTURE: CPT | Performed by: NURSE PRACTITIONER

## 2025-05-13 PROCEDURE — 250N000011 HC RX IP 250 OP 636: Mod: JZ | Performed by: NURSE PRACTITIONER

## 2025-05-13 PROCEDURE — 82565 ASSAY OF CREATININE: CPT | Mod: ZL | Performed by: NURSE PRACTITIONER

## 2025-05-13 RX ORDER — HYDROCORTISONE SODIUM SUCCINATE 100 MG/2ML
100 INJECTION INTRAMUSCULAR; INTRAVENOUS
OUTPATIENT
Start: 2025-05-13

## 2025-05-13 RX ORDER — ALBUTEROL SULFATE 0.83 MG/ML
2.5 SOLUTION RESPIRATORY (INHALATION)
OUTPATIENT
Start: 2025-05-13

## 2025-05-13 RX ORDER — HEPARIN SODIUM (PORCINE) LOCK FLUSH IV SOLN 100 UNIT/ML 100 UNIT/ML
5 SOLUTION INTRAVENOUS
OUTPATIENT
Start: 2025-05-13

## 2025-05-13 RX ORDER — ACETAMINOPHEN 325 MG/1
650 TABLET ORAL ONCE
Status: COMPLETED | OUTPATIENT
Start: 2025-05-13 | End: 2025-05-13

## 2025-05-13 RX ORDER — MEPERIDINE HYDROCHLORIDE 25 MG/ML
25 INJECTION INTRAMUSCULAR; INTRAVENOUS; SUBCUTANEOUS
OUTPATIENT
Start: 2025-05-13

## 2025-05-13 RX ORDER — HUMAN IMMUNOGLOBULIN G 20 G/200ML
0.4 LIQUID INTRAVENOUS ONCE
Start: 2025-05-13

## 2025-05-13 RX ORDER — HEPARIN SODIUM,PORCINE 10 UNIT/ML
5-20 VIAL (ML) INTRAVENOUS DAILY PRN
OUTPATIENT
Start: 2025-05-13

## 2025-05-13 RX ORDER — ACETAMINOPHEN 325 MG/1
650 TABLET ORAL ONCE
Start: 2025-05-13

## 2025-05-13 RX ORDER — METHYLPREDNISOLONE SODIUM SUCCINATE 40 MG/ML
40 INJECTION INTRAMUSCULAR; INTRAVENOUS
Start: 2025-05-13

## 2025-05-13 RX ORDER — HUMAN IMMUNOGLOBULIN G 20 G/200ML
0.4 LIQUID INTRAVENOUS ONCE
Status: COMPLETED | OUTPATIENT
Start: 2025-05-13 | End: 2025-05-13

## 2025-05-13 RX ORDER — EPINEPHRINE 1 MG/ML
0.3 INJECTION, SOLUTION, CONCENTRATE INTRAVENOUS EVERY 5 MIN PRN
OUTPATIENT
Start: 2025-05-13

## 2025-05-13 RX ORDER — DIPHENHYDRAMINE HCL 50 MG
50 CAPSULE ORAL ONCE
Status: COMPLETED | OUTPATIENT
Start: 2025-05-13 | End: 2025-05-13

## 2025-05-13 RX ORDER — MONTELUKAST SODIUM 10 MG/1
10 TABLET ORAL ONCE
Status: COMPLETED | OUTPATIENT
Start: 2025-05-13 | End: 2025-05-13

## 2025-05-13 RX ORDER — DIPHENHYDRAMINE HYDROCHLORIDE 50 MG/ML
25 INJECTION, SOLUTION INTRAMUSCULAR; INTRAVENOUS
Start: 2025-05-13

## 2025-05-13 RX ORDER — DIPHENHYDRAMINE HCL 50 MG
50 CAPSULE ORAL ONCE
Start: 2025-05-13

## 2025-05-13 RX ORDER — ALBUTEROL SULFATE 90 UG/1
1-2 INHALANT RESPIRATORY (INHALATION)
Start: 2025-05-13

## 2025-05-13 RX ORDER — DIPHENHYDRAMINE HYDROCHLORIDE 50 MG/ML
50 INJECTION, SOLUTION INTRAMUSCULAR; INTRAVENOUS
Start: 2025-05-13

## 2025-05-13 RX ORDER — DEXAMETHASONE 4 MG/1
20 TABLET ORAL ONCE
Status: COMPLETED | OUTPATIENT
Start: 2025-05-13 | End: 2025-05-13

## 2025-05-13 RX ADMIN — DARATUMUMAB AND HYALURONIDASE-FIHJ (HUMAN RECOMBINANT) 1800 MG: 1800; 30000 INJECTION SUBCUTANEOUS at 10:02

## 2025-05-13 RX ADMIN — Medication 250 ML: at 09:55

## 2025-05-13 RX ADMIN — MONTELUKAST SODIUM 10 MG: 10 TABLET ORAL at 09:28

## 2025-05-13 RX ADMIN — DEXAMETHASONE 20 MG: 4 TABLET ORAL at 09:27

## 2025-05-13 RX ADMIN — Medication 50 MG: at 09:27

## 2025-05-13 RX ADMIN — HUMAN IMMUNOGLOBULIN G 20 G: 20 LIQUID INTRAVENOUS at 09:57

## 2025-05-13 RX ADMIN — ACETAMINOPHEN 650 MG: 325 TABLET ORAL at 09:27

## 2025-05-13 ASSESSMENT — PAIN SCALES - GENERAL: PAINLEVEL_OUTOF10: NO PAIN (0)

## 2025-05-13 NOTE — PROGRESS NOTES
Infusion Nursing Note:  Carmelita Watts presents today for Faspro & IVIG.    Patient seen by provider today: No   present during visit today: Not Applicable.    Note: N/A.      Intravenous Access:  Labs drawn without difficulty.  Peripheral IV placed.    Treatment Conditions:  Lab Results   Component Value Date    HGB 11.8 05/13/2025    WBC 4.7 05/13/2025    ANEU 1.7 05/13/2025     05/13/2025        Results reviewed, labs MET treatment parameters, ok to proceed with treatment.      Post Infusion Assessment:  Patient tolerated infusion without incident.  Patient tolerated injection without incident.  Site patent and intact, free from redness, edema or discomfort.  No evidence of extravasations.  Access discontinued per protocol.     The following medication was given:     MEDICATION: Darzalex Faspro  ROUTE: SQ  SITE: RLQ - Abd.  DOSE: 1800mg    Discharge Plan:   Patient discharged in stable condition accompanied by: self.  Departure Mode: Ambulatory.

## 2025-06-02 ENCOUNTER — LAB (OUTPATIENT)
Dept: LAB | Facility: OTHER | Age: 75
End: 2025-06-02
Payer: MEDICARE

## 2025-06-02 DIAGNOSIS — D80.1 HYPOGAMMAGLOBULINEMIA: ICD-10-CM

## 2025-06-02 DIAGNOSIS — C90.00 MULTIPLE MYELOMA NOT HAVING ACHIEVED REMISSION (H): Primary | ICD-10-CM

## 2025-06-02 DIAGNOSIS — C90.00 MULTIPLE MYELOMA NOT HAVING ACHIEVED REMISSION (H): ICD-10-CM

## 2025-06-02 LAB
ALBUMIN SERPL BCG-MCNC: 3.9 G/DL (ref 3.5–5.2)
ALP SERPL-CCNC: 137 U/L (ref 40–150)
ALT SERPL W P-5'-P-CCNC: 14 U/L (ref 0–50)
ANION GAP SERPL CALCULATED.3IONS-SCNC: 14 MMOL/L (ref 7–15)
AST SERPL W P-5'-P-CCNC: 18 U/L (ref 0–45)
BASOPHILS # BLD AUTO: 0.1 10E3/UL (ref 0–0.2)
BASOPHILS NFR BLD AUTO: 1 %
BILIRUB SERPL-MCNC: 0.5 MG/DL
BUN SERPL-MCNC: 9.2 MG/DL (ref 8–23)
CALCIUM SERPL-MCNC: 8.9 MG/DL (ref 8.8–10.4)
CHLORIDE SERPL-SCNC: 105 MMOL/L (ref 98–107)
CREAT SERPL-MCNC: 0.8 MG/DL (ref 0.51–0.95)
EGFRCR SERPLBLD CKD-EPI 2021: 76 ML/MIN/1.73M2
EOSINOPHIL # BLD AUTO: 0.5 10E3/UL (ref 0–0.7)
EOSINOPHIL NFR BLD AUTO: 13 %
ERYTHROCYTE [DISTWIDTH] IN BLOOD BY AUTOMATED COUNT: 15.5 % (ref 10–15)
GLUCOSE SERPL-MCNC: 124 MG/DL (ref 70–99)
HCO3 SERPL-SCNC: 20 MMOL/L (ref 22–29)
HCT VFR BLD AUTO: 37.5 % (ref 35–47)
HGB BLD-MCNC: 12.4 G/DL (ref 11.7–15.7)
IMM GRANULOCYTES # BLD: 0.1 10E3/UL
IMM GRANULOCYTES NFR BLD: 1 %
LYMPHOCYTES # BLD AUTO: 1.4 10E3/UL (ref 0.8–5.3)
LYMPHOCYTES NFR BLD AUTO: 33 %
MCH RBC QN AUTO: 30.5 PG (ref 26.5–33)
MCHC RBC AUTO-ENTMCNC: 33.1 G/DL (ref 31.5–36.5)
MCV RBC AUTO: 92 FL (ref 78–100)
MONOCYTES # BLD AUTO: 0.6 10E3/UL (ref 0–1.3)
MONOCYTES NFR BLD AUTO: 15 %
NEUTROPHILS # BLD AUTO: 1.6 10E3/UL (ref 1.6–8.3)
NEUTROPHILS NFR BLD AUTO: 37 %
NRBC # BLD AUTO: 0 10E3/UL
NRBC BLD AUTO-RTO: 0 /100
PLATELET # BLD AUTO: 161 10E3/UL (ref 150–450)
POTASSIUM SERPL-SCNC: 3.9 MMOL/L (ref 3.4–5.3)
PROT SERPL-MCNC: 6.4 G/DL (ref 6.4–8.3)
RBC # BLD AUTO: 4.06 10E6/UL (ref 3.8–5.2)
SODIUM SERPL-SCNC: 139 MMOL/L (ref 135–145)
TOTAL PROTEIN SERUM FOR ELP: 6.1 G/DL (ref 6.4–8.3)
WBC # BLD AUTO: 4.3 10E3/UL (ref 4–11)

## 2025-06-02 PROCEDURE — 82232 ASSAY OF BETA-2 PROTEIN: CPT | Mod: ZL

## 2025-06-02 PROCEDURE — 82784 ASSAY IGA/IGD/IGG/IGM EACH: CPT | Mod: ZL

## 2025-06-02 PROCEDURE — 36415 COLL VENOUS BLD VENIPUNCTURE: CPT | Mod: ZL

## 2025-06-02 PROCEDURE — 85810 BLOOD VISCOSITY EXAMINATION: CPT | Mod: ZL

## 2025-06-02 PROCEDURE — 84165 PROTEIN E-PHORESIS SERUM: CPT | Mod: ZL | Performed by: PATHOLOGY

## 2025-06-02 PROCEDURE — 85025 COMPLETE CBC W/AUTO DIFF WBC: CPT | Mod: ZL

## 2025-06-02 PROCEDURE — 86334 IMMUNOFIX E-PHORESIS SERUM: CPT | Mod: ZL | Performed by: PATHOLOGY

## 2025-06-02 PROCEDURE — 84155 ASSAY OF PROTEIN SERUM: CPT | Mod: ZL

## 2025-06-02 PROCEDURE — 83521 IG LIGHT CHAINS FREE EACH: CPT | Mod: ZL

## 2025-06-02 PROCEDURE — 85014 HEMATOCRIT: CPT | Mod: ZL

## 2025-06-03 ENCOUNTER — TELEPHONE (OUTPATIENT)
Dept: ONCOLOGY | Facility: CLINIC | Age: 75
End: 2025-06-03
Payer: COMMERCIAL

## 2025-06-03 ENCOUNTER — DOCUMENTATION ONLY (OUTPATIENT)
Dept: PHARMACY | Facility: CLINIC | Age: 75
End: 2025-06-03
Payer: COMMERCIAL

## 2025-06-03 ENCOUNTER — RESULTS FOLLOW-UP (OUTPATIENT)
Dept: ONCOLOGY | Facility: OTHER | Age: 75
End: 2025-06-03

## 2025-06-03 LAB
ALBUMIN SERPL ELPH-MCNC: 3.7 G/DL (ref 3.7–5.1)
ALPHA1 GLOB SERPL ELPH-MCNC: 0.3 G/DL (ref 0.2–0.4)
ALPHA2 GLOB SERPL ELPH-MCNC: 0.8 G/DL (ref 0.5–0.9)
B-GLOBULIN SERPL ELPH-MCNC: 0.7 G/DL (ref 0.6–1)
B2 MICROGLOB TUMOR MARKER SER-MCNC: 2 MG/L
GAMMA GLOB SERPL ELPH-MCNC: 0.6 G/DL (ref 0.7–1.6)
IGA SERPL-MCNC: 34 MG/DL (ref 84–499)
IGG SERPL-MCNC: 589 MG/DL (ref 610–1616)
IGM SERPL-MCNC: 34 MG/DL (ref 35–242)
KAPPA LC FREE SER-MCNC: 0.6 MG/DL (ref 0.33–1.94)
KAPPA LC FREE/LAMBDA FREE SER NEPH: 1.54 {RATIO} (ref 0.26–1.65)
LAMBDA LC FREE SERPL-MCNC: 0.39 MG/DL (ref 0.57–2.63)
LOCATION OF TASK: ABNORMAL
LOCATION OF TASK: NORMAL
M PROTEIN SERPL ELPH-MCNC: 0 G/DL
PROT PATTERN SERPL ELPH-IMP: ABNORMAL
PROT PATTERN SERPL IFE-IMP: NORMAL
VISC SER: 1.4 CP (ref 1.4–1.8)

## 2025-06-03 NOTE — ORAL ONC MGMT
Oral Chemotherapy Monitoring Program     Medication: Pomalyst  Rx: 1mg PO daily on days 1 through 21 of 28 day cycle   [facility] Auth # [id number]  Routine survey questions reviewed  Rx to be Escribed to SP

## 2025-06-03 NOTE — PROGRESS NOTES
Pharmacist IVIG Stewardship Program    Diagnosis: Hypogammaglobulinemia   Date  12/2/2024   IgG Level (mg/dL) 375     Current Dosing Regimen: Privigen 20g IV every 4 weeks PRN for IgG <600 mg/dL   Patient is dosed as needed based on an IgG level of less than 600 mg/dL to ensure the lowest amount of medication is administered to achieve beneficial outcomes.  Pre-medications:   Acetaminophen 650 mg by mouth, 30 minutes prior to infusion  Diphenhydramine 50 mg by mouth, 30 minutes prior to infusion  Previously Tried Products: None     Side Effects: Unable to reach patient to discuss.     Interventions: Lab review completed.     Assessment:   Date  6/2/2025   IgG Level (mg/dL) 589   Patient is appropriate for IVIG therapy when their level is within parameter. IVIG infusions are effective in increasing IgG levels to an appropriate level to minimize patient's risk of infection. Patient should continue on treatment as they have been per provider order. Without therapy their levels would put them at an increased risk of infections.    Plan: Patient is okay to proceed with IVIG infusion on 6/9/2025 as her level is below 600 mg/dL. Patient will have additional labs drawn next month to assess the need for ongoing infusions. Patient should not have levels drawn on the same day as IVIG infusion.      Next Review Needed: 7/2025    Karen Menon, PharmD, IgCP  Medication Access Pharmacist

## 2025-06-09 ENCOUNTER — INFUSION THERAPY VISIT (OUTPATIENT)
Dept: INFUSION THERAPY | Facility: OTHER | Age: 75
End: 2025-06-09
Attending: NURSE PRACTITIONER
Payer: MEDICARE

## 2025-06-09 ENCOUNTER — ONCOLOGY VISIT (OUTPATIENT)
Dept: ONCOLOGY | Facility: OTHER | Age: 75
End: 2025-06-09
Attending: NURSE PRACTITIONER
Payer: MEDICARE

## 2025-06-09 ENCOUNTER — LAB (OUTPATIENT)
Dept: LAB | Facility: OTHER | Age: 75
End: 2025-06-09
Attending: NURSE PRACTITIONER
Payer: MEDICARE

## 2025-06-09 VITALS — DIASTOLIC BLOOD PRESSURE: 76 MMHG | HEART RATE: 78 BPM | RESPIRATION RATE: 16 BRPM | SYSTOLIC BLOOD PRESSURE: 128 MMHG

## 2025-06-09 VITALS
HEART RATE: 76 BPM | WEIGHT: 170.64 LBS | HEIGHT: 63 IN | BODY MASS INDEX: 30.23 KG/M2 | DIASTOLIC BLOOD PRESSURE: 74 MMHG | OXYGEN SATURATION: 99 % | SYSTOLIC BLOOD PRESSURE: 126 MMHG | TEMPERATURE: 98.2 F

## 2025-06-09 DIAGNOSIS — D80.1 HYPOGAMMAGLOBULINEMIA: Primary | ICD-10-CM

## 2025-06-09 DIAGNOSIS — D80.1 HYPOGAMMAGLOBULINEMIA: ICD-10-CM

## 2025-06-09 DIAGNOSIS — M89.9 BONE LESION: ICD-10-CM

## 2025-06-09 DIAGNOSIS — C90.00 MULTIPLE MYELOMA NOT HAVING ACHIEVED REMISSION (H): Primary | ICD-10-CM

## 2025-06-09 DIAGNOSIS — C90.00 MULTIPLE MYELOMA NOT HAVING ACHIEVED REMISSION (H): ICD-10-CM

## 2025-06-09 LAB
ALBUMIN SERPL BCG-MCNC: 4 G/DL (ref 3.5–5.2)
ALP SERPL-CCNC: 131 U/L (ref 40–150)
ALT SERPL W P-5'-P-CCNC: 14 U/L (ref 0–50)
ANION GAP SERPL CALCULATED.3IONS-SCNC: 11 MMOL/L (ref 7–15)
AST SERPL W P-5'-P-CCNC: 14 U/L (ref 0–45)
BASOPHILS # BLD AUTO: 0.1 10E3/UL (ref 0–0.2)
BASOPHILS NFR BLD AUTO: 2 %
BILIRUB SERPL-MCNC: 0.5 MG/DL
BUN SERPL-MCNC: 12.4 MG/DL (ref 8–23)
CALCIUM SERPL-MCNC: 9.2 MG/DL (ref 8.8–10.4)
CHLORIDE SERPL-SCNC: 104 MMOL/L (ref 98–107)
CREAT SERPL-MCNC: 0.85 MG/DL (ref 0.51–0.95)
EGFRCR SERPLBLD CKD-EPI 2021: 71 ML/MIN/1.73M2
EOSINOPHIL # BLD AUTO: 0.6 10E3/UL (ref 0–0.7)
EOSINOPHIL NFR BLD AUTO: 9 %
ERYTHROCYTE [DISTWIDTH] IN BLOOD BY AUTOMATED COUNT: 15.9 % (ref 10–15)
GLUCOSE SERPL-MCNC: 136 MG/DL (ref 70–99)
HCO3 SERPL-SCNC: 24 MMOL/L (ref 22–29)
HCT VFR BLD AUTO: 38.6 % (ref 35–47)
HGB BLD-MCNC: 12.5 G/DL (ref 11.7–15.7)
IMM GRANULOCYTES # BLD: 0.1 10E3/UL
IMM GRANULOCYTES NFR BLD: 1 %
LYMPHOCYTES # BLD AUTO: 2.1 10E3/UL (ref 0.8–5.3)
LYMPHOCYTES NFR BLD AUTO: 32 %
MCH RBC QN AUTO: 30.3 PG (ref 26.5–33)
MCHC RBC AUTO-ENTMCNC: 32.4 G/DL (ref 31.5–36.5)
MCV RBC AUTO: 94 FL (ref 78–100)
MONOCYTES # BLD AUTO: 0.9 10E3/UL (ref 0–1.3)
MONOCYTES NFR BLD AUTO: 14 %
NEUTROPHILS # BLD AUTO: 2.8 10E3/UL (ref 1.6–8.3)
NEUTROPHILS NFR BLD AUTO: 43 %
NRBC # BLD AUTO: 0 10E3/UL
NRBC BLD AUTO-RTO: 0 /100
PLATELET # BLD AUTO: 204 10E3/UL (ref 150–450)
POTASSIUM SERPL-SCNC: 4.1 MMOL/L (ref 3.4–5.3)
PROT SERPL-MCNC: 6.6 G/DL (ref 6.4–8.3)
RBC # BLD AUTO: 4.12 10E6/UL (ref 3.8–5.2)
SODIUM SERPL-SCNC: 139 MMOL/L (ref 135–145)
WBC # BLD AUTO: 6.6 10E3/UL (ref 4–11)

## 2025-06-09 PROCEDURE — 96401 CHEMO ANTI-NEOPL SQ/IM: CPT

## 2025-06-09 PROCEDURE — 36415 COLL VENOUS BLD VENIPUNCTURE: CPT | Mod: ZL

## 2025-06-09 PROCEDURE — G0463 HOSPITAL OUTPT CLINIC VISIT: HCPCS

## 2025-06-09 PROCEDURE — 250N000011 HC RX IP 250 OP 636: Mod: JZ | Performed by: NURSE PRACTITIONER

## 2025-06-09 PROCEDURE — 82310 ASSAY OF CALCIUM: CPT | Mod: ZL

## 2025-06-09 PROCEDURE — 96365 THER/PROPH/DIAG IV INF INIT: CPT

## 2025-06-09 PROCEDURE — 96366 THER/PROPH/DIAG IV INF ADDON: CPT

## 2025-06-09 PROCEDURE — G0463 HOSPITAL OUTPT CLINIC VISIT: HCPCS | Mod: 25

## 2025-06-09 PROCEDURE — 85025 COMPLETE CBC W/AUTO DIFF WBC: CPT | Mod: ZL

## 2025-06-09 RX ORDER — DIPHENHYDRAMINE HCL 50 MG
50 CAPSULE ORAL ONCE
Start: 2025-06-09

## 2025-06-09 RX ORDER — ACETAMINOPHEN 325 MG/1
650 TABLET ORAL ONCE
Start: 2025-06-09

## 2025-06-09 RX ORDER — ACETAMINOPHEN 325 MG/1
650 TABLET ORAL ONCE
Status: COMPLETED | OUTPATIENT
Start: 2025-06-09 | End: 2025-06-09

## 2025-06-09 RX ORDER — ALBUTEROL SULFATE 90 UG/1
1-2 INHALANT RESPIRATORY (INHALATION)
Start: 2025-06-09

## 2025-06-09 RX ORDER — HYDROCORTISONE SODIUM SUCCINATE 100 MG/2ML
100 INJECTION INTRAMUSCULAR; INTRAVENOUS
OUTPATIENT
Start: 2025-06-09

## 2025-06-09 RX ORDER — HUMAN IMMUNOGLOBULIN G 20 G/200ML
0.4 LIQUID INTRAVENOUS ONCE
Status: COMPLETED | OUTPATIENT
Start: 2025-06-09 | End: 2025-06-09

## 2025-06-09 RX ORDER — HEPARIN SODIUM (PORCINE) LOCK FLUSH IV SOLN 100 UNIT/ML 100 UNIT/ML
5 SOLUTION INTRAVENOUS
OUTPATIENT
Start: 2025-07-08

## 2025-06-09 RX ORDER — LORAZEPAM 2 MG/ML
0.5 INJECTION INTRAMUSCULAR EVERY 4 HOURS PRN
Status: CANCELLED | OUTPATIENT
Start: 2025-06-09

## 2025-06-09 RX ORDER — DIPHENHYDRAMINE HCL 50 MG
50 CAPSULE ORAL ONCE
Status: COMPLETED | OUTPATIENT
Start: 2025-06-09 | End: 2025-06-09

## 2025-06-09 RX ORDER — METHYLPREDNISOLONE SODIUM SUCCINATE 40 MG/ML
40 INJECTION INTRAMUSCULAR; INTRAVENOUS
Start: 2025-06-09

## 2025-06-09 RX ORDER — DIPHENHYDRAMINE HCL 50 MG
50 CAPSULE ORAL
Status: CANCELLED | OUTPATIENT
Start: 2025-06-09

## 2025-06-09 RX ORDER — METHYLPREDNISOLONE SODIUM SUCCINATE 125 MG/2ML
125 INJECTION INTRAMUSCULAR; INTRAVENOUS
Status: CANCELLED
Start: 2025-06-09

## 2025-06-09 RX ORDER — DEXAMETHASONE SODIUM PHOSPHATE 10 MG/ML
20 INJECTION, SOLUTION INTRAMUSCULAR; INTRAVENOUS ONCE
Status: CANCELLED
Start: 2025-06-09 | End: 2025-06-09

## 2025-06-09 RX ORDER — MEPERIDINE HYDROCHLORIDE 25 MG/ML
25 INJECTION INTRAMUSCULAR; INTRAVENOUS; SUBCUTANEOUS EVERY 30 MIN PRN
Status: CANCELLED | OUTPATIENT
Start: 2025-06-09

## 2025-06-09 RX ORDER — DEXAMETHASONE 4 MG/1
20 TABLET ORAL ONCE
Status: CANCELLED | OUTPATIENT
Start: 2025-06-09

## 2025-06-09 RX ORDER — HEPARIN SODIUM,PORCINE 10 UNIT/ML
5-20 VIAL (ML) INTRAVENOUS DAILY PRN
OUTPATIENT
Start: 2025-07-08

## 2025-06-09 RX ORDER — DEXAMETHASONE 4 MG/1
20 TABLET ORAL ONCE
Status: COMPLETED | OUTPATIENT
Start: 2025-06-09 | End: 2025-06-09

## 2025-06-09 RX ORDER — ALBUTEROL SULFATE 0.83 MG/ML
2.5 SOLUTION RESPIRATORY (INHALATION)
OUTPATIENT
Start: 2025-06-09

## 2025-06-09 RX ORDER — HEPARIN SODIUM,PORCINE 10 UNIT/ML
5-20 VIAL (ML) INTRAVENOUS DAILY PRN
OUTPATIENT
Start: 2025-06-09

## 2025-06-09 RX ORDER — ALBUTEROL SULFATE 0.83 MG/ML
2.5 SOLUTION RESPIRATORY (INHALATION)
Status: CANCELLED | OUTPATIENT
Start: 2025-06-09

## 2025-06-09 RX ORDER — EPINEPHRINE 1 MG/ML
0.3 INJECTION, SOLUTION, CONCENTRATE INTRAVENOUS EVERY 5 MIN PRN
Status: CANCELLED | OUTPATIENT
Start: 2025-06-09

## 2025-06-09 RX ORDER — ACETAMINOPHEN 325 MG/1
650 TABLET ORAL
Status: CANCELLED | OUTPATIENT
Start: 2025-06-09

## 2025-06-09 RX ORDER — MEPERIDINE HYDROCHLORIDE 25 MG/ML
25 INJECTION INTRAMUSCULAR; INTRAVENOUS; SUBCUTANEOUS
OUTPATIENT
Start: 2025-06-09

## 2025-06-09 RX ORDER — DIPHENHYDRAMINE HYDROCHLORIDE 50 MG/ML
50 INJECTION, SOLUTION INTRAMUSCULAR; INTRAVENOUS
Start: 2025-06-09

## 2025-06-09 RX ORDER — EPINEPHRINE 1 MG/ML
0.3 INJECTION, SOLUTION, CONCENTRATE INTRAVENOUS EVERY 5 MIN PRN
OUTPATIENT
Start: 2025-06-09

## 2025-06-09 RX ORDER — MONTELUKAST SODIUM 10 MG/1
10 TABLET ORAL ONCE
Status: COMPLETED | OUTPATIENT
Start: 2025-06-09 | End: 2025-06-09

## 2025-06-09 RX ORDER — ZOLEDRONIC ACID 0.04 MG/ML
4 INJECTION, SOLUTION INTRAVENOUS ONCE
OUTPATIENT
Start: 2025-07-08 | End: 2025-07-08

## 2025-06-09 RX ORDER — DIPHENHYDRAMINE HYDROCHLORIDE 50 MG/ML
50 INJECTION, SOLUTION INTRAMUSCULAR; INTRAVENOUS
Status: CANCELLED
Start: 2025-06-09

## 2025-06-09 RX ORDER — DIPHENHYDRAMINE HYDROCHLORIDE 50 MG/ML
25 INJECTION, SOLUTION INTRAMUSCULAR; INTRAVENOUS
Start: 2025-06-09

## 2025-06-09 RX ORDER — ALBUTEROL SULFATE 90 UG/1
1-2 INHALANT RESPIRATORY (INHALATION)
Status: CANCELLED
Start: 2025-06-09

## 2025-06-09 RX ORDER — MONTELUKAST SODIUM 10 MG/1
10 TABLET ORAL ONCE
Status: CANCELLED | OUTPATIENT
Start: 2025-06-09

## 2025-06-09 RX ORDER — HUMAN IMMUNOGLOBULIN G 20 G/200ML
0.4 LIQUID INTRAVENOUS ONCE
Start: 2025-06-09

## 2025-06-09 RX ORDER — DIPHENHYDRAMINE HCL 50 MG
50 CAPSULE ORAL ONCE
Status: CANCELLED | OUTPATIENT
Start: 2025-06-09

## 2025-06-09 RX ORDER — HEPARIN SODIUM (PORCINE) LOCK FLUSH IV SOLN 100 UNIT/ML 100 UNIT/ML
5 SOLUTION INTRAVENOUS
OUTPATIENT
Start: 2025-06-09

## 2025-06-09 RX ORDER — ACETAMINOPHEN 325 MG/1
650 TABLET ORAL ONCE
Status: CANCELLED | OUTPATIENT
Start: 2025-06-09

## 2025-06-09 RX ADMIN — DEXAMETHASONE 20 MG: 4 TABLET ORAL at 10:05

## 2025-06-09 RX ADMIN — Medication 50 MG: at 10:06

## 2025-06-09 RX ADMIN — HUMAN IMMUNOGLOBULIN G 20 G: 20 LIQUID INTRAVENOUS at 10:50

## 2025-06-09 RX ADMIN — DARATUMUMAB AND HYALURONIDASE-FIHJ (HUMAN RECOMBINANT) 1800 MG: 1800; 30000 INJECTION SUBCUTANEOUS at 10:41

## 2025-06-09 RX ADMIN — MONTELUKAST SODIUM 10 MG: 10 TABLET ORAL at 10:06

## 2025-06-09 RX ADMIN — Medication 250 ML: at 10:04

## 2025-06-09 RX ADMIN — ACETAMINOPHEN 650 MG: 325 TABLET ORAL at 10:05

## 2025-06-09 ASSESSMENT — PAIN SCALES - GENERAL: PAINLEVEL_OUTOF10: NO PAIN (0)

## 2025-06-09 NOTE — PROGRESS NOTES
Infusion Nursing Note:  Carmelita Watts presents today for FASPRO / IVIG.    Patient seen by provider today: Yes: Nida Gonzalez NP ONC  Home medications, allergies, vitals, fall risk, pain and abuse screen done at [facility] ONC appt earlier this date. All reviewed by this nurse prior to treating. Patient denies questions or concerns not already discussed with provider prior, wishes to proceed with treatment.    present during visit today: Not Applicable.    Note: 0945: Secure chat to provider re sign needed of FASPRO plan.      Treatment Conditions:  Lab Results   Component Value Date    HGB 12.5 06/09/2025    WBC 6.6 06/09/2025    ANEU 2.8 06/09/2025     06/09/2025        Lab Results   Component Value Date     06/09/2025    POTASSIUM 4.1 06/09/2025    MAG 2.1 04/07/2025    CR 0.85 06/09/2025    PAYAL 9.2 06/09/2025    BILITOTAL 0.5 06/09/2025    ALBUMIN 4.0 06/09/2025    ALT 14 06/09/2025    AST 14 06/09/2025       Results reviewed, labs MET treatment parameters, ok to proceed with treatment.      Post Infusion Assessment:  Patient tolerated injection without incident.    Patient tolerated infusion without incident.  IVIG pump library step/titration utilized. Followed orders in MAR, including IBW, titration per kg, and volume of 6.5 mL increase every 15 minutes to max rate.   Site patent and intact, free from redness, edema or discomfort.  No evidence of extravasations.   Line removed.  Printed/given ONC AVS.       Discharge Plan:   Patient discharged in stable condition accompanied by: self.  Departure Mode: Ambulatory.

## 2025-06-09 NOTE — PATIENT INSTRUCTIONS
We will see you back as planned. If you have any questions or concerns, we can be reached Monday through Friday 8am - 430pm at [phone] (PAC). If you have concerns related to a potential reaction/side effect after hours/weekends/holiday's, please seek emergent medical care.

## 2025-06-09 NOTE — PROGRESS NOTES
Oncology Follow-up Visit    Reason for Visit:  Carmelita is a 74 year old woman with a diagnosis of multiple myeloma, who I am visiting with today for routine follow-up and consideration of ongoing therapy.     Nursing Note and documentation reviewed: Yes    Provider location: [place]    Interval History:   Zena is doing well. She denies any big concerns or complaints today. States that she has noticed some upper extremity (bilateral) muscle aches. This isn't bone pain and isn't limiting her ADLs. Still mowing the lawn but needing to take a periodic Tylenol. No swelling, warmth, or redness.     She denies any sings of infections. No fever, chills, chest pain, cough, or SOB. No bleeding concerns. She has had some skin issues with the steroid (this resolves after steroid) but was hopeful to use fish oil to help with overall skin health. No nausea, vomiting, GI concerns. Energy decent. She does get tired and fatigued and does require naps during the day but is still mowing, walking, doing chores,etc. Staying busy. Has very good support group that keeps her active.     Oncologic History  12/31/2018 Presented to the emergency room with vertigo and fatigue.  CT scan of the head was negative and subsequent stress test was negative.  05/03/2019 PCP ordered lab work and noted a total protein of 12.9.  SPEP at that time showed an M spike of 6.2 with a large monoclonal protein seen in the gamma fraction. Urine immunofixation showed a possible small protein band in the gamma fraction  05/31/2019 Evaluated by Dr. Walker with Medical Oncology with plan to rule out myeloma and obtain bone marrow aspiration biopsy as well as a metastatic bone survey along with additional labwork  06/18/2019 Underwent bone marrow aspiration and biopsy  06/24/2019 Seen again by Dr. Walker and CBC showed a hemoglobin of 9.3, M spike 7.3 with monoclonal IgG immunoglobulin of kappa light chain type; serum viscosity was 2.9; quantitative  immunoglobulins showed an IgG of 8160, beta-2 microglobulin was 5.8, BUN was 21 with creatinine is 0.8 and total protein was 13.7.  Quantitative kappa/lambda free light chains showed an elevated ratio of 17.0 bone marrow aspiration biopsy showed plasma cell myeloma with approximately 80% plasma cells.  Immunofixation showed IgG kappa and flow cytometry revealed kappa monotypic plasma cells consistent with clonal plasma cell neoplasm and FISH panel was pending at that time. It was felt she had at least stage II disease based on her beta-2 microglobulin and anemia. Plan was to treat with Velcade 1.3 mg per/m2 days 1, 4, 8 and 11/Decadron 40 mg on days 1, 8 and 15. Initiation of Revlimid with C2 at 25 mg daily days 1 through 14.  Plan was to also obtain an MRI of the lumbar spine to rule out lytic lesion at L3.  She was initiated on Zovirax 400 mg p.o. twice daily.  06/25/2019 C1 of chemotherapy initiated  07/01/2019 Note in chart regarding patient's large co-pay for the Revlimid and no plan at this point to initiate Revlimid and treat only with Velcade and Decadron per Dr. Walker  07/11/2019 MRI lumbar spine shows a pathologic superior endplate compression fracture at L3 without evidence of retropulsed fragment and innumerable enhancing lesions throughout the lumbar spine consistent with history of multiple myeloma.  09/10/2019 Increasing M-spike and kappa lambda ratio  10/01/2019 Initiation of CyBorD  11/17/2020 Plateau of M-spike at 1.2 after 36 cycles of CyBorD  12/01/2020 Initiation of Darzalex Faspro injection  03/23/2021 M-spike increased form 1.0 to 1.2 and velcade and dexamethasone added to plan  04/12/2022 Treatment HOLIDAY commenced with Mspike 0.3 after 22 cycles of treatent  05/27/2022 Received Evusheld while on treatment holiday.   07/18/2023 Mspike 0.8. Other labs stable. PET scan ordered.   08/02/2023 PET completed showing negative study. No evidence of mass or lymphadenopathy. No concerning  hypermetabolism is present.  08/14/2023 Met with Dr. Walker. Given biochemical progression, he did feel appropriate to resume therapy again with Darzalex faspro  03/12/2024 Following C7, mspike magy from 0.2-->0.8. PET ordered which was negative. Again increased frequency of Darzalex with anticipation of adding in Pomalyst with C2.    04/29/2024 Addition of Pomalyst with C2  12/16/2024 IVIG added for suppressed IgG levels and increased infections    Current Chemo Regime/TX: Darzalex faspro injection 1800mg subcutaneous per protocol and Pomalyst 1 mg days 1-21 of 28 day cycle given with weekly Dex  Current Cycle: C16  Completed cycles: 15  **Pom added C2 (4/29)     Previous treatment:     Velcade 1.3 mg/m2 days 1, 4, 8 and 11 with Decadron 40 mg days 1, 8 and 15 x 4 cycles;     Velcade 1.5mg.m2, cyclophosphamide 150mg every 7 days on days 1,8,15 and 22 with decadron 40mg days 1,8,15,22  Darzalex faspro injection 1800mg subcutaneous per protocol    Past Medical History:   Diagnosis Date    Arthritis     Cyst of breast     Depressive disorder     Diabetes mellitus, type 2 (H) 01/18/2021    Essential hypertension 10/01/2015    Major depressive disorder, recurrent episode, mild 10/01/2015    Mixed hyperlipidemia 7/5/2013    Multiple myeloma not having achieved remission (H) 06/24/2019    Other specified disorders of bladder 07/09/2012    Irritable Bladder    Seasonal allergies 10/01/2015    Unspecified essential hypertension 03/19/2007    Unspecified sinusitis (chronic) 09/05/2007       Past Surgical History:   Procedure Laterality Date    APPENDECTOMY      BONE MARROW BIOPSY, BONE SPECIMEN, NEEDLE/TROCAR N/A 06/18/2019    Procedure: BONE MARROW BIOPSY;  Surgeon: Maciej Sanz MD;  Location: [facility]    CHOLECYSTECTOMY      COLONOSCOPY  07/18/2006    repeat 10 years    COLONOSCOPY N/A 12/30/2016    Procedure: COLONOSCOPY;  Surgeon: Bhaskar Franklin DO;  Location: [facility]    PUNC/ASPIR BREAST CYST Left 1995    benign  "   SINUS SURGERY      TUBAL STERILIZATION         Family History   Problem Relation Age of Onset    Breast Cancer Mother 60        Cause of Death; 1 breast    Parkinsonism Father         \"Possible\"    Coronary Artery Disease Father     Pacemaker Father     Thyroid Disease Daughter     Breast Cancer Maternal Aunt         over 50    Diabetes No family hx of     Hypertension No family hx of     Hyperlipidemia No family hx of     Cerebrovascular Disease No family hx of     Colon Cancer No family hx of     Prostate Cancer No family hx of     Genetic Disorder No family hx of     Asthma No family hx of     Anesthesia Reaction No family hx of        Social History     Socioeconomic History    Marital status:      Spouse name: Not on file    Number of children: Not on file    Years of education: Not on file    Highest education level: Not on file   Occupational History    Occupation: Financial     Comment:  - (FT)   Tobacco Use    Smoking status: Never    Smokeless tobacco: Never    Tobacco comments:     Tried to Quit (YES); QUIT in 1971; Passive Exposure (NO)   Vaping Use    Vaping status: Never Used   Substance and Sexual Activity    Alcohol use: Yes     Comment: RARELY    Drug use: No    Sexual activity: Yes     Partners: Male     Birth control/protection: None   Other Topics Concern     Service Not Asked    Blood Transfusions Not Asked    Caffeine Concern Yes     Comment: Coffee >6 cups daily    Occupational Exposure Not Asked    Hobby Hazards Not Asked    Sleep Concern Not Asked    Stress Concern Not Asked    Weight Concern Not Asked    Special Diet Not Asked    Back Care Not Asked    Exercise Not Asked    Bike Helmet Not Asked    Seat Belt Not Asked    Self-Exams Not Asked    Parent/sibling w/ CABG, MI or angioplasty before 65F 55M? No   Social History Narrative    Not on file     Social Drivers of Health     Financial Resource Strain: Low Risk  (1/7/2025)    Financial Resource Strain     " Within the past 12 months, have you or your family members you live with been unable to get utilities (heat, electricity) when it was really needed?: No   Food Insecurity: Low Risk  (1/7/2025)    Food Insecurity     Within the past 12 months, did you worry that your food would run out before you got money to buy more?: No     Within the past 12 months, did the food you bought just not last and you didn t have money to get more?: No   Transportation Needs: Low Risk  (1/7/2025)    Transportation Needs     Within the past 12 months, has lack of transportation kept you from medical appointments, getting your medicines, non-medical meetings or appointments, work, or from getting things that you need?: No   Physical Activity: Not on file   Stress: Not on file   Social Connections: Not on file   Interpersonal Safety: Low Risk  (6/9/2025)    Interpersonal Safety     Do you feel physically and emotionally safe where you currently live?: Yes     Within the past 12 months, have you been hit, slapped, kicked or otherwise physically hurt by someone?: No     Within the past 12 months, have you been humiliated or emotionally abused in other ways by your partner or ex-partner?: No   Housing Stability: Low Risk  (1/7/2025)    Housing Stability     Do you have housing? : Yes     Are you worried about losing your housing?: No       Current Outpatient Medications   Medication Sig Dispense Refill    acyclovir (ZOVIRAX) 400 MG tablet Take 1 tablet (400 mg) by mouth 2 times daily. Take one tablet my mouth twice daily 60 tablet 1    aspirin (ASA) 81 MG chewable tablet Take 81 mg by mouth daily      atorvastatin (LIPITOR) 20 MG tablet TAKE 1 TABLET BY MOUTH DAILY 90 tablet 3    cetirizine (ZYRTEC) 10 MG tablet Take 1 tablet (10 mg) by mouth daily 30 tablet 1    dexAMETHasone (DECADRON) 4 MG tablet Take 5 tablets (20 mg) on Days 8, 15, and 22. 15 tablet 3    diphenhydrAMINE (BENADRYL) 25 MG capsule Take 25 mg by mouth daily.      fluticasone  "(FLONASE) 50 MCG/ACT nasal spray SPRAY 2 SPRAYS INTO BOTH NOSTRILS DAILY 48 mL 0    hydrocortisone (CORTAID) 1 % external cream APPLY SPARINGLY OVER RASH TO FACE AND CHEST TOPICALLY TWICE DAILY 45 g 0    L-Lysine HCl 500 MG CAPS Take by mouth daily.      losartan (COZAAR) 50 MG tablet TAKE 1 TABLET BY MOUTH DAILY 90 tablet 3    pomalidomide (POMALYST) 1 MG capsule Take 1 capsule (1 mg) by mouth daily for 21 days Day 1 thru 21 of each 28 day cycle. Swallow whole with water. 21 capsule 0    sertraline (ZOLOFT) 50 MG tablet TAKE 1/2 TABLET BY MOUTH DAILY 45 tablet 3    loperamide (IMODIUM) 2 MG capsule Take 2 mg by mouth 4 times daily as needed for diarrhea. (Patient not taking: Reported on 6/9/2025)      prochlorperazine (COMPAZINE) 10 MG tablet Take 1 tablet (10 mg) by mouth every 6 hours as needed for nausea or vomiting (Patient not taking: Reported on 6/9/2025) 30 tablet 2     No current facility-administered medications for this visit.        Allergies   Allergen Reactions    Lisinopril Cough    Phenylephrine Hcl Other (See Comments)     **Entex  HEADACHE (SEVERE)    Phenylpropanolamine Other (See Comments)     **Entex  HEADACHE (SEVERE)    Pseudoephedrine Tannate Other (See Comments)     **Entex  HEADACHE (SEVERE)    Levofloxacin Rash     **Levaquin    Moxifloxacin Hcl [Moxifloxacin Hcl In Nacl] Rash       Review Of Systems:  A complete review of systems is negative except for the above mentioned items in the interval history.     ECOG PS: 0    Physical Exam:  /74 (BP Location: Left arm, Patient Position: Sitting, Cuff Size: Adult Regular)   Pulse 76   Temp 98.2  F (36.8  C) (Tympanic)   Ht 1.6 m (5' 3\")   Wt 77.4 kg (170 lb 10.2 oz)   SpO2 99%   BMI 30.23 kg/m    GENERAL APPEARANCE: Healthy, alert and in no acute distress.  HEENT: Eyes appear normal without scleral icterus. Extraocular movements intact.   NECK:   Supple with normal range of motion. No asymmetry or masses.  RESP: Lungs clear to " auscultation bilaterally, respirations regular and easy.  CARDIOVASCULAR: Regular rate and rhythm. Normal S1, S2; no murmur, gallop, or rub.  MUSCULOSKELETAL: Extremities without gross deformities noted. No edema of bilateral lower extremities.  SKIN: No suspicious lesions or rashes.  NEURO: Alert and oriented x 3.  Gait steady.  PSYCHIATRIC: Mentation and affect appear normal.  Mood appropriate.      Laboratory:  Component      Latest Ref [facility] 6/2/2025  10:16 AM   WBC      4.0 - 11.0 10e3/uL 4.3    RBC Count      3.80 - 5.20 10e6/uL 4.06    Hemoglobin      11.7 - 15.7 g/dL 12.4    Hematocrit      35.0 - 47.0 % 37.5    MCV      78 - 100 fL 92    MCH      26.5 - 33.0 pg 30.5    MCHC      31.5 - 36.5 g/dL 33.1    RDW      10.0 - 15.0 % 15.5 (H)    Platelet Count      150 - 450 10e3/uL 161    % Neutrophils      % 37    % Lymphocytes      % 33    % Monocytes      % 15    % Eosinophils      % 13    % Basophils      % 1    % Immature Granulocytes      % 1    NRBC/W      <1 /100 0    Absolute Neutrophil      1.6 - 8.3 10e3/uL 1.6    Absolute Lymphocytes      0.8 - 5.3 10e3/uL 1.4    Absolute Monocytes      0.0 - 1.3 10e3/uL 0.6    Absolute Eosinophils      0.0 - 0.7 10e3/uL 0.5    Absolute Basophils      0.0 - 0.2 10e3/uL 0.1    Absolute Immature Granulocytes      <=0.4 10e3/uL 0.1    Absolute NRBCs      10e3/uL 0.0    Sodium      135 - 145 mmol/L 139    Potassium      3.4 - 5.3 mmol/L 3.9    Carbon Dioxide (CO2)      22 - 29 mmol/L 20 (L)    Anion Gap      7 - 15 mmol/L 14    Urea Nitrogen      8.0 - 23.0 mg/dL 9.2    Creatinine      0.51 - 0.95 mg/dL 0.80    GFR Estimate      >60 mL/min/1.73m2 76    Calcium      8.8 - 10.4 mg/dL 8.9    Chloride      98 - 107 mmol/L 105    Glucose      70 - 99 mg/dL 124 (H)    Alkaline Phosphatase      40 - 150 U/L 137    AST      0 - 45 U/L 18    ALT      0 - 50 U/L 14    Protein Total      6.4 - 8.3 g/dL 6.4    Albumin      3.5 - 5.2 g/dL 3.9    Bilirubin Total      <=1.2 mg/dL 0.5     Albumin Fraction      3.7 - 5.1 g/dL 3.7    Alpha 1 Fraction      0.2 - 0.4 g/dL 0.3    Alpha 2 Fraction      0.5 - 0.9 g/dL 0.8    Beta Fraction      0.6 - 1.0 g/dL 0.7    Gamma Fraction      0.7 - 1.6 g/dL 0.6 (L)    Monoclonal Peak      <=0.0 g/dL 0.0    ELP Interpretation: Essentially normal electrophoretic pattern. No obvious monoclonal proteins seen. Pathologic significance requires clinical correlation. BIA Izaguirre M.D., Ph.D., Pathologist ().    Signout Location if Remote [facility]    Signout Location if Remote [facility]    IGG      610 - 1,616 mg/dL 589 (L)    IGA      84 - 499 mg/dL 34 (L)    IGM      35 - 242 mg/dL 34 (L)    [place] Free Lt Chain      0.33 - 1.94 mg/dL 0.60    Lambda Free Lt Chain      0.57 - 2.63 mg/dL 0.39 (L)    Kappa Lambda Ratio      0.26 - 1.65  1.54    Immunofixation ELP No monoclonal protein seen on immunofixation.  Pathologic significance requires clinical correlation.  BIA Izaguirre M.D., Ph.D., Pathologist ()    Beta-2-Microglobulin      <2.3 mg/L 2.0    Viscosity Index      1.4 - 1.8  1.4    Total Protein Serum for ELP      6.4 - 8.3 g/dL 6.1 (L)       Legend:  (H) High  (L) Low      Imaging Studies:  None this visit.     ASSESSMENT/PLAN:  #1 Multiple myeloma IgG kappa multiple myeloma with 80% involvement of the bone marrow diagnosed June 2019 initially treated with Velcade and Decadron and received 4 cycles with increasing M-spike and kappa/lambda ratio.  Treatment changed to CyBorD on 10/1/2019.  M-spike plateau at 1.2 and treatment changed to monotherapy with Darzalex Faspro injection. M spike declined to 1.0 then increased again to 1.2 so Velcade and Dex added to her treatment plan with cycle 5 therapy. Following C22 (April 2022), she did initiate a treatment holiday when her Mspike was 0.3.     We had followed her counts for over a year. Unfortunately, she did see a rise in her mspike to 0.8. PET completed was negative. Dr. Walker felt this was a  biochemical progression and after discussion with patient, elected to resume treatment with Darzalex faspro. Mspike began to rise. PET was negative but feeling like she wasn't responding to Darzalex alone, decided to add in Pomalyst 1 mg p.o. daily on days 1 through 21 to begin with cycle 2 in addition to dexamethasone 20 mg p.o. on days 1 ,8,15,22.    Today, doing well. No new concerns. No detectable monoclonal protein. Stable light chains. I see no contraindications to proceeding with therapy today. She will continue Darzalex q28 days. Dex weekly. Pom for 21 days (due to start tomorrow). Will repeat myeloma labs in 3 weeks, I will see her in 4 weeks, sooner with concerns.     #2 Lytic lesions Hx lytic lesion at L3. Zometa Q3M, due July. She remains on calcium with vitamin D twice a day. Weight bearing activity.    #3 Hypogammaglobulinemia Hx decreased IgG levels and recurrent ear infections, Dr. Wakler elected to commence IVIG. This is to be given every 4 weeks for IgG levels less than 600. Levels recently 589. Will porceed today.           Patient in agreement with plan and verbalizes understanding. Agrees to call with any questions or concerns.      38 minutes spent in the patient's encounter today with time spent in review of patient's chart along with chart preparation and review of the treatment plan and signing of treatment plan.  Time was also spent with the patient in obtaining a review of systems and performing a physical exam along with detailed review of all test results. Time was also spent in discussing plan for future follow-up and relating instructions for follow-up and in placing future orders.    The longitudinal plan of care for the diagnosis(es)/condition(s) as documented were addressed during this visit. Due to the added complexity in care, I will continue to support Carmelita in the subsequent management and with ongoing continuity of care.        FLO Duque, VAMSIP-C

## 2025-06-09 NOTE — NURSING NOTE
"Oncology Rooming Note    June 9, 2025 8:48 AM   Carmelita Watts is a 74 year old female who presents for:    Chief Complaint   Patient presents with    Oncology Clinic Visit     Follow up. Multiple myeloma not having achieved remission     Initial Vitals: /74 (BP Location: Left arm, Patient Position: Sitting, Cuff Size: Adult Regular)   Pulse 76   Temp 98.2  F (36.8  C) (Tympanic)   Ht 1.6 m (5' 3\")   Wt 77.4 kg (170 lb 10.2 oz)   SpO2 99%   BMI 30.23 kg/m   Estimated body mass index is 30.23 kg/m  as calculated from the following:    Height as of this encounter: 1.6 m (5' 3\").    Weight as of this encounter: 77.4 kg (170 lb 10.2 oz). Body surface area is 1.85 meters squared.  No Pain (0) Comment: Data Unavailable   No LMP recorded. Patient is postmenopausal.  Allergies reviewed: Yes  Medications reviewed: Yes    Medications: Medication refills not needed today.  Pharmacy name entered into e(ye)BRAIN:    [id number] - [place], [place] - [address] AT [facility] [address]  [facility] - [place], [place] - [address]    Frailty Screening:   Is the patient here for a new oncology consult visit in cancer care? 2. No    PHQ9:  Did this patient require a PHQ9?: No      Clinical concerns: none       Julia See LPN              "

## 2025-06-30 ENCOUNTER — LAB (OUTPATIENT)
Dept: LAB | Facility: OTHER | Age: 75
End: 2025-06-30
Payer: MEDICARE

## 2025-06-30 DIAGNOSIS — C90.00 MULTIPLE MYELOMA NOT HAVING ACHIEVED REMISSION (H): ICD-10-CM

## 2025-06-30 DIAGNOSIS — C90.00 MULTIPLE MYELOMA NOT HAVING ACHIEVED REMISSION (H): Primary | ICD-10-CM

## 2025-06-30 LAB — TOTAL PROTEIN SERUM FOR ELP: 6 G/DL (ref 6.4–8.3)

## 2025-06-30 PROCEDURE — 83521 IG LIGHT CHAINS FREE EACH: CPT | Mod: ZL

## 2025-06-30 PROCEDURE — 82232 ASSAY OF BETA-2 PROTEIN: CPT | Mod: ZL

## 2025-06-30 PROCEDURE — 86334 IMMUNOFIX E-PHORESIS SERUM: CPT | Mod: 26 | Performed by: PATHOLOGY

## 2025-06-30 PROCEDURE — 85810 BLOOD VISCOSITY EXAMINATION: CPT | Mod: ZL

## 2025-06-30 PROCEDURE — 86334 IMMUNOFIX E-PHORESIS SERUM: CPT | Mod: ZL | Performed by: PATHOLOGY

## 2025-06-30 PROCEDURE — 84155 ASSAY OF PROTEIN SERUM: CPT | Mod: ZL

## 2025-06-30 PROCEDURE — 84165 PROTEIN E-PHORESIS SERUM: CPT | Mod: 26 | Performed by: PATHOLOGY

## 2025-06-30 PROCEDURE — 84165 PROTEIN E-PHORESIS SERUM: CPT | Mod: ZL | Performed by: PATHOLOGY

## 2025-06-30 PROCEDURE — 36415 COLL VENOUS BLD VENIPUNCTURE: CPT | Mod: ZL

## 2025-06-30 PROCEDURE — 82784 ASSAY IGA/IGD/IGG/IGM EACH: CPT | Mod: ZL

## 2025-06-30 RX ORDER — DEXAMETHASONE 4 MG/1
TABLET ORAL
Qty: 15 TABLET | Refills: 0 | Status: SHIPPED | OUTPATIENT
Start: 2025-06-30

## 2025-06-30 RX ORDER — DEXAMETHASONE 4 MG/1
TABLET ORAL
Qty: 15 TABLET | Refills: 3 | Status: SHIPPED | OUTPATIENT
Start: 2025-06-30

## 2025-07-01 ENCOUNTER — RESULTS FOLLOW-UP (OUTPATIENT)
Dept: ONCOLOGY | Facility: OTHER | Age: 75
End: 2025-07-01

## 2025-07-01 ENCOUNTER — DOCUMENTATION ONLY (OUTPATIENT)
Dept: PHARMACY | Facility: CLINIC | Age: 75
End: 2025-07-01
Payer: COMMERCIAL

## 2025-07-01 LAB
ALBUMIN SERPL ELPH-MCNC: 3.7 G/DL (ref 3.7–5.1)
ALPHA1 GLOB SERPL ELPH-MCNC: 0.3 G/DL (ref 0.2–0.4)
ALPHA2 GLOB SERPL ELPH-MCNC: 0.7 G/DL (ref 0.5–0.9)
B-GLOBULIN SERPL ELPH-MCNC: 0.7 G/DL (ref 0.6–1)
B2 MICROGLOB TUMOR MARKER SER-MCNC: 2.3 MG/L
GAMMA GLOB SERPL ELPH-MCNC: 0.6 G/DL (ref 0.7–1.6)
IGA SERPL-MCNC: 34 MG/DL (ref 84–499)
IGG SERPL-MCNC: 613 MG/DL (ref 610–1616)
IGM SERPL-MCNC: 39 MG/DL (ref 35–242)
KAPPA LC FREE SER-MCNC: 0.66 MG/DL (ref 0.33–1.94)
KAPPA LC FREE/LAMBDA FREE SER NEPH: 1.65 {RATIO} (ref 0.26–1.65)
LAMBDA LC FREE SERPL-MCNC: 0.4 MG/DL (ref 0.57–2.63)
M PROTEIN SERPL ELPH-MCNC: 0 G/DL
PROT PATTERN SERPL ELPH-IMP: ABNORMAL
PROT PATTERN SERPL IFE-IMP: NORMAL
VISC SER: 1.4 CP (ref 1.4–1.8)

## 2025-07-01 NOTE — PROGRESS NOTES
Pharmacist IVIG Stewardship Program    Diagnosis: Hypogammaglobulinemia   Date  12/2/2024   IgG Level (mg/dL) 375     Current Dosing Regimen: Privigen 20g IV every 4 weeks PRN for IgG <600 mg/dL   Patient is dosed as needed based on an IgG level of less than 600 mg/dL to ensure the lowest amount of medication is administered to achieve beneficial outcomes.  Pre-medications:   Acetaminophen 650 mg by mouth, 30 minutes prior to infusion  Diphenhydramine 50 mg by mouth, 30 minutes prior to infusion  Previously Tried Products: None     Side Effects: Unable to reach patient to discuss.     Interventions: Lab review completed.     Assessment:   Date  6/30/2025   IgG Level (mg/dL) 613   Patient is appropriate for IVIG therapy when their level is within parameter. IVIG infusions are effective in increasing IgG levels to an appropriate level to minimize patient's risk of infection. Patient should continue on treatment as they have been per provider order. Without therapy their levels would put them at an increased risk of infections.    Plan: Patient is not okay to proceed with IVIG infusion on 7/7/2025 as her level is above 600 mg/dL. Message was sent to care team informing that IVIG portion of 7/7/25 infusion should be cancelled. Patient will have additional labs drawn next month to assess the need for ongoing infusions. Patient should not have levels drawn on the same day as IVIG infusion.      Next Review Needed: 8/2025    Karen Menon, PharmD, IgCP  Medication Access Pharmacist

## 2025-07-02 NOTE — PROGRESS NOTES
Assessment & Plan     Type 2 diabetes mellitus with hyperosmolarity without coma, without long-term current use of insulin (H)  A1c today of 6.9 which is an increase from 6.5.   - Hemoglobin A1c; Future  - FOOT EXAM  NO CHARGE [09371.114]  - no diabetic medications, yet    Hypertension goal BP (blood pressure) < 140/80  BP at goal. Cr/gfr/k wnl  - c/w losartan     Hypogammaglobulinemia / Multiple myeloma not having achieved remission (H)  Follows with [facility] with last visit 6/9/2025. Note reviewed. Follow up with labs on 7/7/2025   - doing well. No detectable monoclonal proteins  - c/w chemotherapy  - c/w IVIG  - labs being updated today     Major depressive disorder, recurrent episode, mild  Mood stable. Carmelita works on staying active  - c/w sertraline 25mg     The longitudinal plan of care for the diagnosis(es)/condition(s) as documented were addressed during this visit. Due to the added complexity in care, I will continue to support Carmelita in the subsequent management and with ongoing continuity of care.              Subjective   Carmelita is a 74 year old, presenting for the following health issues:  Depression, Anxiety, Diabetes, and Hypertension        7/7/2025     7:38 AM   Additional Questions   Roomed by Annmarie Lgauna     History of Present Illness       Reason for visit:  NA   She is taking medications regularly.          Diabetes Follow-up    How often are you checking your blood sugar? Not at all  What concerns do you have today about your diabetes? None   Do you have any of these symptoms? (Select all that apply)  No numbness or tingling in feet.  No redness, sores or blisters on feet.  No complaints of excessive thirst.  No reports of blurry vision.  No significant changes to weight.  Have you had a diabetic eye exam in the last 12 months? No    - BG are running good   - A1c today     Diabetic Foot Screen:  Any complaints of increased pain or numbness ? No  Is there a foot ulcer now or  "a history of foot ulcer? No  Does the foot have an abnormal shape? No  Are the nails thick, too long or ingrown? No  Are there any redness or open areas? No         Sensation Testing done at all points on the diagram with monofilament     Right Foot: Sensation Normal at all points  Left Foot: Sensation Normal at all points     Risk Category: 0- No loss of protective sensation  Performed by Elena Gold MD        BP Readings from Last 2 Encounters:   07/07/25 128/78   06/09/25 128/76     Hemoglobin A1C (%)   Date Value   01/07/2025 6.5 (H)   10/24/2023 6.7 (H)   04/27/2021 6.4 (H)   01/26/2021 6.3 (H)     LDL Cholesterol Calculated (mg/dL)   Date Value   01/07/2025 110 (H)   10/24/2023 112 (H)   04/27/2021 137 (H)   02/14/2020 125 (H)       Hypertension Follow-up    Do you check your blood pressure regularly outside of the clinic? No   Are you following a low salt diet? No  Are your blood pressures ever more than 140 on the top number (systolic) OR more   than 90 on the bottom number (diastolic), for example 140/90? Yes    BP Readings from Last 2 Encounters:   07/07/25 128/78   06/09/25 128/76     Depression and Anxiety   How are you doing with your depression since your last visit? No change  How are you doing with your anxiety since your last visit?  No change  Are you having other symptoms that might be associated with depression or anxiety? No  Have you had a significant life event? Health Concerns   Do you have any concerns with your use of alcohol or other drugs? No    - mood doing \"pretty good\"    Social History     Tobacco Use    Smoking status: Never    Smokeless tobacco: Never    Tobacco comments:     Tried to Quit (YES); QUIT in 1971; Passive Exposure (NO)   Vaping Use    Vaping status: Never Used   Substance Use Topics    Alcohol use: Yes     Comment: RARELY    Drug use: No         8/23/2023     7:52 AM 1/7/2025     7:42 AM 7/7/2025     7:36 AM   PHQ   PHQ-9 Total Score 3 7  5    Q9: Thoughts of " "better off dead/self-harm past 2 weeks Not at all  Not at all Not at all       Patient-reported    Proxy-reported         4/16/2024     8:26 AM 1/7/2025     7:43 AM 7/7/2025     7:37 AM   MINAL-7 SCORE   Total Score  1 (minimal anxiety) 0 (minimal anxiety)   Total Score 0 1  0        Patient-reported       # Oncology: MM  Follows with [facility] with last visit 6/9/2025. Note reviewed. Follow up with labs on 7/7/2025   - doing well. No detectable monoclonal proteins  - c/w chemotherapy  - c/w IVIG      Review of Systems  Constitutional, HEENT, cardiovascular, pulmonary, gi and gu systems are negative, except as otherwise noted.      Objective    /78 (BP Location: Left arm, Patient Position: Sitting, Cuff Size: Adult Regular)   Pulse 69   Temp 98.2  F (36.8  C) (Tympanic)   Resp 16   Ht 1.6 m (5' 3\")   Wt 76.2 kg (168 lb)   SpO2 99%   BMI 29.76 kg/m    Body mass index is 29.76 kg/m .  Physical Exam  Constitutional:       General: She is not in acute distress.     Appearance: She is not ill-appearing.   Cardiovascular:      Rate and Rhythm: Normal rate and regular rhythm.      Pulses:           Dorsalis pedis pulses are 2+ on the right side and 2+ on the left side.      Heart sounds: No murmur heard.  Pulmonary:      Effort: Pulmonary effort is normal. No respiratory distress.      Breath sounds: No wheezing or rales.   Feet:      Right foot:      Protective Sensation: 10 sites tested.  10 sites sensed.      Skin integrity: Skin integrity normal.      Toenail Condition: Right toenails are normal.      Left foot:      Protective Sensation: 10 sites tested.  10 sites sensed.      Skin integrity: Skin integrity normal.      Toenail Condition: Left toenails are normal.   Neurological:      Mental Status: She is alert.            Results for orders placed or performed in visit on 07/07/25   Comprehensive metabolic panel     Status: Abnormal   Result Value Ref Range    Sodium 140 135 - 145 mmol/L    " Potassium 3.9 3.4 - 5.3 mmol/L    Carbon Dioxide (CO2) 23 22 - 29 mmol/L    Anion Gap 13 7 - 15 mmol/L    Urea Nitrogen 10.2 8.0 - 23.0 mg/dL    Creatinine 0.83 0.51 - 0.95 mg/dL    GFR Estimate 73 >60 mL/min/1.73m2    Calcium 9.3 8.8 - 10.4 mg/dL    Chloride 104 98 - 107 mmol/L    Glucose 130 (H) 70 - 99 mg/dL    Alkaline Phosphatase 126 40 - 150 U/L    AST 18 0 - 45 U/L    ALT 15 0 - 50 U/L    Protein Total 6.3 (L) 6.4 - 8.3 g/dL    Albumin 3.9 3.5 - 5.2 g/dL    Bilirubin Total 0.5 <=1.2 mg/dL   Hemoglobin A1c     Status: Abnormal   Result Value Ref Range    Estimated Average Glucose 151 (H) <117 mg/dL    Hemoglobin A1C 6.9 (H) <5.7 %   CBC with platelets and differential     Status: Abnormal   Result Value Ref Range    WBC Count 6.3 4.0 - 11.0 10e3/uL    RBC Count 4.34 3.80 - 5.20 10e6/uL    Hemoglobin 13.3 11.7 - 15.7 g/dL    Hematocrit 39.9 35.0 - 47.0 %    MCV 92 78 - 100 fL    MCH 30.6 26.5 - 33.0 pg    MCHC 33.3 31.5 - 36.5 g/dL    RDW 15.4 (H) 10.0 - 15.0 %    Platelet Count 214 150 - 450 10e3/uL    % Neutrophils 50 %    % Lymphocytes 29 %    % Monocytes 14 %    % Eosinophils 4 %    % Basophils 2 %    % Immature Granulocytes 1 %    NRBCs per 100 WBC 0 <1 /100    Absolute Neutrophils 3.2 1.6 - 8.3 10e3/uL    Absolute Lymphocytes 1.9 0.8 - 5.3 10e3/uL    Absolute Monocytes 0.9 0.0 - 1.3 10e3/uL    Absolute Eosinophils 0.3 0.0 - 0.7 10e3/uL    Absolute Basophils 0.1 0.0 - 0.2 10e3/uL    Absolute Immature Granulocytes 0.1 <=0.4 10e3/uL    Absolute NRBCs 0.0 10e3/uL   CBC with Platelets & Differential     Status: Abnormal    Narrative    The following orders were created for panel order CBC with Platelets & Differential.  Procedure                               Abnormality         Status                     ---------                               -----------         ------                     CBC with platelets and ...[[id number]]  Abnormal            Final result                 Please view results for these  tests on the individual orders.             Signed Electronically by: Elena Gold MD

## 2025-07-07 ENCOUNTER — INFUSION THERAPY VISIT (OUTPATIENT)
Dept: INFUSION THERAPY | Facility: OTHER | Age: 75
End: 2025-07-07
Attending: NURSE PRACTITIONER
Payer: MEDICARE

## 2025-07-07 ENCOUNTER — OFFICE VISIT (OUTPATIENT)
Dept: FAMILY MEDICINE | Facility: OTHER | Age: 75
End: 2025-07-07
Attending: NURSE PRACTITIONER
Payer: MEDICARE

## 2025-07-07 ENCOUNTER — ONCOLOGY VISIT (OUTPATIENT)
Dept: ONCOLOGY | Facility: OTHER | Age: 75
End: 2025-07-07
Attending: NURSE PRACTITIONER
Payer: MEDICARE

## 2025-07-07 ENCOUNTER — LAB (OUTPATIENT)
Dept: LAB | Facility: OTHER | Age: 75
End: 2025-07-07
Attending: NURSE PRACTITIONER
Payer: MEDICARE

## 2025-07-07 VITALS
RESPIRATION RATE: 20 BRPM | WEIGHT: 168.21 LBS | HEIGHT: 63 IN | SYSTOLIC BLOOD PRESSURE: 104 MMHG | DIASTOLIC BLOOD PRESSURE: 70 MMHG | OXYGEN SATURATION: 96 % | HEART RATE: 80 BPM | TEMPERATURE: 98.1 F | BODY MASS INDEX: 29.8 KG/M2

## 2025-07-07 VITALS
WEIGHT: 168 LBS | RESPIRATION RATE: 16 BRPM | DIASTOLIC BLOOD PRESSURE: 78 MMHG | OXYGEN SATURATION: 99 % | HEART RATE: 69 BPM | TEMPERATURE: 98.2 F | BODY MASS INDEX: 29.77 KG/M2 | HEIGHT: 63 IN | SYSTOLIC BLOOD PRESSURE: 128 MMHG

## 2025-07-07 VITALS — DIASTOLIC BLOOD PRESSURE: 69 MMHG | SYSTOLIC BLOOD PRESSURE: 118 MMHG

## 2025-07-07 DIAGNOSIS — C90.00 MULTIPLE MYELOMA NOT HAVING ACHIEVED REMISSION (H): Primary | ICD-10-CM

## 2025-07-07 DIAGNOSIS — D80.1 HYPOGAMMAGLOBULINEMIA: ICD-10-CM

## 2025-07-07 DIAGNOSIS — E11.00 TYPE 2 DIABETES MELLITUS WITH HYPEROSMOLARITY WITHOUT COMA, WITHOUT LONG-TERM CURRENT USE OF INSULIN (H): Primary | ICD-10-CM

## 2025-07-07 DIAGNOSIS — I10 HYPERTENSION GOAL BP (BLOOD PRESSURE) < 140/80: ICD-10-CM

## 2025-07-07 DIAGNOSIS — F33.0 MAJOR DEPRESSIVE DISORDER, RECURRENT EPISODE, MILD: ICD-10-CM

## 2025-07-07 DIAGNOSIS — E11.00 TYPE 2 DIABETES MELLITUS WITH HYPEROSMOLARITY WITHOUT COMA, WITHOUT LONG-TERM CURRENT USE OF INSULIN (H): ICD-10-CM

## 2025-07-07 DIAGNOSIS — C90.00 MULTIPLE MYELOMA NOT HAVING ACHIEVED REMISSION (H): ICD-10-CM

## 2025-07-07 LAB
ALBUMIN SERPL BCG-MCNC: 3.9 G/DL (ref 3.5–5.2)
ALP SERPL-CCNC: 126 U/L (ref 40–150)
ALT SERPL W P-5'-P-CCNC: 15 U/L (ref 0–50)
ANION GAP SERPL CALCULATED.3IONS-SCNC: 13 MMOL/L (ref 7–15)
AST SERPL W P-5'-P-CCNC: 18 U/L (ref 0–45)
BASOPHILS # BLD AUTO: 0.1 10E3/UL (ref 0–0.2)
BASOPHILS NFR BLD AUTO: 2 %
BILIRUB SERPL-MCNC: 0.5 MG/DL
BUN SERPL-MCNC: 10.2 MG/DL (ref 8–23)
CALCIUM SERPL-MCNC: 9.3 MG/DL (ref 8.8–10.4)
CHLORIDE SERPL-SCNC: 104 MMOL/L (ref 98–107)
CREAT SERPL-MCNC: 0.83 MG/DL (ref 0.51–0.95)
EGFRCR SERPLBLD CKD-EPI 2021: 73 ML/MIN/1.73M2
EOSINOPHIL # BLD AUTO: 0.3 10E3/UL (ref 0–0.7)
EOSINOPHIL NFR BLD AUTO: 4 %
ERYTHROCYTE [DISTWIDTH] IN BLOOD BY AUTOMATED COUNT: 15.4 % (ref 10–15)
EST. AVERAGE GLUCOSE BLD GHB EST-MCNC: 151 MG/DL
GLUCOSE SERPL-MCNC: 130 MG/DL (ref 70–99)
HBA1C MFR BLD: 6.9 %
HCO3 SERPL-SCNC: 23 MMOL/L (ref 22–29)
HCT VFR BLD AUTO: 39.9 % (ref 35–47)
HGB BLD-MCNC: 13.3 G/DL (ref 11.7–15.7)
IMM GRANULOCYTES # BLD: 0.1 10E3/UL
IMM GRANULOCYTES NFR BLD: 1 %
LYMPHOCYTES # BLD AUTO: 1.9 10E3/UL (ref 0.8–5.3)
LYMPHOCYTES NFR BLD AUTO: 29 %
MCH RBC QN AUTO: 30.6 PG (ref 26.5–33)
MCHC RBC AUTO-ENTMCNC: 33.3 G/DL (ref 31.5–36.5)
MCV RBC AUTO: 92 FL (ref 78–100)
MONOCYTES # BLD AUTO: 0.9 10E3/UL (ref 0–1.3)
MONOCYTES NFR BLD AUTO: 14 %
NEUTROPHILS # BLD AUTO: 3.2 10E3/UL (ref 1.6–8.3)
NEUTROPHILS NFR BLD AUTO: 50 %
NRBC # BLD AUTO: 0 10E3/UL
NRBC BLD AUTO-RTO: 0 /100
PLATELET # BLD AUTO: 214 10E3/UL (ref 150–450)
POTASSIUM SERPL-SCNC: 3.9 MMOL/L (ref 3.4–5.3)
PROT SERPL-MCNC: 6.3 G/DL (ref 6.4–8.3)
RBC # BLD AUTO: 4.34 10E6/UL (ref 3.8–5.2)
SODIUM SERPL-SCNC: 140 MMOL/L (ref 135–145)
WBC # BLD AUTO: 6.3 10E3/UL (ref 4–11)

## 2025-07-07 PROCEDURE — 250N000011 HC RX IP 250 OP 636: Mod: JZ | Performed by: NURSE PRACTITIONER

## 2025-07-07 PROCEDURE — 36415 COLL VENOUS BLD VENIPUNCTURE: CPT | Mod: ZL

## 2025-07-07 PROCEDURE — G0463 HOSPITAL OUTPT CLINIC VISIT: HCPCS

## 2025-07-07 PROCEDURE — 85004 AUTOMATED DIFF WBC COUNT: CPT | Mod: ZL

## 2025-07-07 PROCEDURE — 3044F HG A1C LEVEL LT 7.0%: CPT

## 2025-07-07 PROCEDURE — 83036 HEMOGLOBIN GLYCOSYLATED A1C: CPT | Mod: ZL

## 2025-07-07 PROCEDURE — 84155 ASSAY OF PROTEIN SERUM: CPT | Mod: ZL

## 2025-07-07 PROCEDURE — G0463 HOSPITAL OUTPT CLINIC VISIT: HCPCS | Mod: 25

## 2025-07-07 RX ORDER — DEXAMETHASONE SODIUM PHOSPHATE 10 MG/ML
20 INJECTION, SOLUTION INTRAMUSCULAR; INTRAVENOUS ONCE
Status: CANCELLED
Start: 2025-07-07 | End: 2025-07-07

## 2025-07-07 RX ORDER — ALBUTEROL SULFATE 90 UG/1
1-2 INHALANT RESPIRATORY (INHALATION)
Status: CANCELLED
Start: 2025-07-07

## 2025-07-07 RX ORDER — EPINEPHRINE 1 MG/ML
0.3 INJECTION, SOLUTION, CONCENTRATE INTRAVENOUS EVERY 5 MIN PRN
Status: CANCELLED | OUTPATIENT
Start: 2025-07-07

## 2025-07-07 RX ORDER — DIPHENHYDRAMINE HCL 50 MG
50 CAPSULE ORAL
Status: CANCELLED | OUTPATIENT
Start: 2025-07-07

## 2025-07-07 RX ORDER — LORAZEPAM 2 MG/ML
0.5 INJECTION INTRAMUSCULAR EVERY 4 HOURS PRN
Status: CANCELLED | OUTPATIENT
Start: 2025-07-07

## 2025-07-07 RX ORDER — ALBUTEROL SULFATE 0.83 MG/ML
2.5 SOLUTION RESPIRATORY (INHALATION)
Status: CANCELLED | OUTPATIENT
Start: 2025-07-07

## 2025-07-07 RX ORDER — ACETAMINOPHEN 325 MG/1
650 TABLET ORAL
Status: CANCELLED | OUTPATIENT
Start: 2025-07-07

## 2025-07-07 RX ORDER — ACYCLOVIR 400 MG/1
400 TABLET ORAL 2 TIMES DAILY
Qty: 120 TABLET | Refills: 1 | Status: SHIPPED | OUTPATIENT
Start: 2025-07-07

## 2025-07-07 RX ORDER — DEXAMETHASONE 4 MG/1
20 TABLET ORAL ONCE
Status: CANCELLED | OUTPATIENT
Start: 2025-07-07

## 2025-07-07 RX ORDER — DIPHENHYDRAMINE HCL 50 MG
50 CAPSULE ORAL ONCE
Status: CANCELLED | OUTPATIENT
Start: 2025-07-07

## 2025-07-07 RX ORDER — ACETAMINOPHEN 325 MG/1
650 TABLET ORAL ONCE
Status: COMPLETED | OUTPATIENT
Start: 2025-07-07 | End: 2025-07-07

## 2025-07-07 RX ORDER — MONTELUKAST SODIUM 10 MG/1
10 TABLET ORAL ONCE
Status: CANCELLED | OUTPATIENT
Start: 2025-07-07

## 2025-07-07 RX ORDER — MEPERIDINE HYDROCHLORIDE 25 MG/ML
25 INJECTION INTRAMUSCULAR; INTRAVENOUS; SUBCUTANEOUS EVERY 30 MIN PRN
Status: CANCELLED | OUTPATIENT
Start: 2025-07-07

## 2025-07-07 RX ORDER — METHYLPREDNISOLONE SODIUM SUCCINATE 125 MG/2ML
125 INJECTION INTRAMUSCULAR; INTRAVENOUS
Status: CANCELLED
Start: 2025-07-07

## 2025-07-07 RX ORDER — DEXAMETHASONE 4 MG/1
20 TABLET ORAL ONCE
Status: COMPLETED | OUTPATIENT
Start: 2025-07-07 | End: 2025-07-07

## 2025-07-07 RX ORDER — ACETAMINOPHEN 325 MG/1
650 TABLET ORAL ONCE
Status: CANCELLED | OUTPATIENT
Start: 2025-07-07

## 2025-07-07 RX ORDER — DEXAMETHASONE 4 MG/1
TABLET ORAL
Qty: 60 TABLET | Refills: 2 | Status: SHIPPED | OUTPATIENT
Start: 2025-07-07

## 2025-07-07 RX ORDER — DIPHENHYDRAMINE HYDROCHLORIDE 50 MG/ML
50 INJECTION, SOLUTION INTRAMUSCULAR; INTRAVENOUS
Status: CANCELLED
Start: 2025-07-07

## 2025-07-07 RX ORDER — DIPHENHYDRAMINE HCL 50 MG
50 CAPSULE ORAL ONCE
Status: COMPLETED | OUTPATIENT
Start: 2025-07-07 | End: 2025-07-07

## 2025-07-07 RX ORDER — MONTELUKAST SODIUM 10 MG/1
10 TABLET ORAL ONCE
Status: COMPLETED | OUTPATIENT
Start: 2025-07-07 | End: 2025-07-07

## 2025-07-07 RX ADMIN — DARATUMUMAB AND HYALURONIDASE-FIHJ (HUMAN RECOMBINANT) 1800 MG: 1800; 30000 INJECTION SUBCUTANEOUS at 10:59

## 2025-07-07 RX ADMIN — Medication 250 ML: at 11:27

## 2025-07-07 RX ADMIN — DEXAMETHASONE 20 MG: 4 TABLET ORAL at 10:22

## 2025-07-07 RX ADMIN — ACETAMINOPHEN 650 MG: 325 TABLET ORAL at 10:23

## 2025-07-07 RX ADMIN — Medication 50 MG: at 10:24

## 2025-07-07 RX ADMIN — MONTELUKAST SODIUM 10 MG: 10 TABLET ORAL at 10:23

## 2025-07-07 RX ADMIN — ZOLEDRONIC ACID 3.5 MG: 4 INJECTION, SOLUTION, CONCENTRATE INTRAVENOUS at 11:28

## 2025-07-07 ASSESSMENT — ANXIETY QUESTIONNAIRES
5. BEING SO RESTLESS THAT IT IS HARD TO SIT STILL: NOT AT ALL
GAD7 TOTAL SCORE: 0
4. TROUBLE RELAXING: NOT AT ALL
8. IF YOU CHECKED OFF ANY PROBLEMS, HOW DIFFICULT HAVE THESE MADE IT FOR YOU TO DO YOUR WORK, TAKE CARE OF THINGS AT HOME, OR GET ALONG WITH OTHER PEOPLE?: SOMEWHAT DIFFICULT
3. WORRYING TOO MUCH ABOUT DIFFERENT THINGS: NOT AT ALL
2. NOT BEING ABLE TO STOP OR CONTROL WORRYING: NOT AT ALL
GAD7 TOTAL SCORE: 0
7. FEELING AFRAID AS IF SOMETHING AWFUL MIGHT HAPPEN: NOT AT ALL
6. BECOMING EASILY ANNOYED OR IRRITABLE: NOT AT ALL
7. FEELING AFRAID AS IF SOMETHING AWFUL MIGHT HAPPEN: NOT AT ALL
1. FEELING NERVOUS, ANXIOUS, OR ON EDGE: NOT AT ALL
IF YOU CHECKED OFF ANY PROBLEMS ON THIS QUESTIONNAIRE, HOW DIFFICULT HAVE THESE PROBLEMS MADE IT FOR YOU TO DO YOUR WORK, TAKE CARE OF THINGS AT HOME, OR GET ALONG WITH OTHER PEOPLE: SOMEWHAT DIFFICULT
GAD7 TOTAL SCORE: 0

## 2025-07-07 ASSESSMENT — PAIN SCALES - GENERAL
PAINLEVEL_OUTOF10: NO PAIN (0)
PAINLEVEL_OUTOF10: NO PAIN (0)

## 2025-07-07 ASSESSMENT — PATIENT HEALTH QUESTIONNAIRE - PHQ9
10. IF YOU CHECKED OFF ANY PROBLEMS, HOW DIFFICULT HAVE THESE PROBLEMS MADE IT FOR YOU TO DO YOUR WORK, TAKE CARE OF THINGS AT HOME, OR GET ALONG WITH OTHER PEOPLE: SOMEWHAT DIFFICULT
SUM OF ALL RESPONSES TO PHQ QUESTIONS 1-9: 5
SUM OF ALL RESPONSES TO PHQ QUESTIONS 1-9: 5

## 2025-07-07 NOTE — NURSING NOTE
"Oncology Rooming Note    July 7, 2025 8:36 AM   Carmelita Watts is a 74 year old female who presents for:    Chief Complaint   Patient presents with    Oncology Clinic Visit     Follow up Multiple Myeloma     Initial Vitals: Pulse 80   Temp 98.1  F (36.7  C) (Tympanic)   Resp 20   Ht 1.6 m (5' 3\")   Wt 76.3 kg (168 lb 3.4 oz)   SpO2 96%   BMI 29.80 kg/m   Estimated body mass index is 29.8 kg/m  as calculated from the following:    Height as of this encounter: 1.6 m (5' 3\").    Weight as of this encounter: 76.3 kg (168 lb 3.4 oz). Body surface area is 1.84 meters squared.  No Pain (0) Comment: Data Unavailable   No LMP recorded. Patient is postmenopausal.  Allergies reviewed: Yes  Medications reviewed: Yes    Medications: MEDICATION REFILLS NEEDED TODAY. Provider was notified.  Pharmacy name entered into [facility]:    [id number] - [place], [place] - [address] AT [facility] [address] [place], [place] - [address]    Frailty Screening:   Is the patient here for a new oncology consult visit in cancer care? 2. No    PHQ9:  Did this patient require a PHQ9?: No            Vilma Chi LPN             "

## 2025-07-07 NOTE — PROGRESS NOTES
Oncology Follow-up Visit    Reason for Visit:  Carmelita is a 74 year old woman with a diagnosis of multiple myeloma, who I am visiting with today for routine follow-up and consideration of ongoing therapy.     Nursing Note and documentation reviewed: Yes    Provider location: [place]    Interval History:   Doing well. No concerns. No recent infections. No bleeding concerns. No nausea or vomiting. No bowel concerns. No skin concerns. Energy good.     Working through boredom but notes that not depressed. Trying to be more involved with hobbies. Notes that her friends all just like to eat out and she doesn't like that quite as much.     Oncologic History  12/31/2018 Presented to the emergency room with vertigo and fatigue.  CT scan of the head was negative and subsequent stress test was negative.  05/03/2019 PCP ordered lab work and noted a total protein of 12.9.  SPEP at that time showed an M spike of 6.2 with a large monoclonal protein seen in the gamma fraction. Urine immunofixation showed a possible small protein band in the gamma fraction  05/31/2019 Evaluated by Dr. Walker with Medical Oncology with plan to rule out myeloma and obtain bone marrow aspiration biopsy as well as a metastatic bone survey along with additional labwork  06/18/2019 Underwent bone marrow aspiration and biopsy  06/24/2019 Seen again by Dr. Walker and CBC showed a hemoglobin of 9.3, M spike 7.3 with monoclonal IgG immunoglobulin of kappa light chain type; serum viscosity was 2.9; quantitative immunoglobulins showed an IgG of 8160, beta-2 microglobulin was 5.8, BUN was 21 with creatinine is 0.8 and total protein was 13.7.  Quantitative kappa/lambda free light chains showed an elevated ratio of 17.0 bone marrow aspiration biopsy showed plasma cell myeloma with approximately 80% plasma cells.  Immunofixation showed IgG kappa and flow cytometry revealed kappa monotypic plasma cells consistent with clonal plasma cell neoplasm and FISH panel  was pending at that time. It was felt she had at least stage II disease based on her beta-2 microglobulin and anemia. Plan was to treat with Velcade 1.3 mg per/m2 days 1, 4, 8 and 11/Decadron 40 mg on days 1, 8 and 15. Initiation of Revlimid with C2 at 25 mg daily days 1 through 14.  Plan was to also obtain an MRI of the lumbar spine to rule out lytic lesion at L3.  She was initiated on Zovirax 400 mg p.o. twice daily.  06/25/2019 C1 of chemotherapy initiated  07/01/2019 Note in chart regarding patient's large co-pay for the Revlimid and no plan at this point to initiate Revlimid and treat only with Velcade and Decadron per Dr. Walker  07/11/2019 MRI lumbar spine shows a pathologic superior endplate compression fracture at L3 without evidence of retropulsed fragment and innumerable enhancing lesions throughout the lumbar spine consistent with history of multiple myeloma.  09/10/2019 Increasing M-spike and kappa lambda ratio  10/01/2019 Initiation of CyBorD  11/17/2020 Plateau of M-spike at 1.2 after 36 cycles of CyBorD  12/01/2020 Initiation of Darzalex Faspro injection  03/23/2021 M-spike increased form 1.0 to 1.2 and velcade and dexamethasone added to plan  04/12/2022 Treatment HOLIDAY commenced with Mspike 0.3 after 22 cycles of treatent  05/27/2022 Received Evusheld while on treatment holiday.   07/18/2023 Mspike 0.8. Other labs stable. PET scan ordered.   08/02/2023 PET completed showing negative study. No evidence of mass or lymphadenopathy. No concerning hypermetabolism is present.  08/14/2023 Met with Dr. Walker. Given biochemical progression, he did feel appropriate to resume therapy again with Darzalex faspro  03/12/2024 Following C7, mspike magy from 0.2-->0.8. PET ordered which was negative. Again increased frequency of Darzalex with anticipation of adding in Pomalyst with C2.    04/29/2024 Addition of Pomalyst with C2  12/16/2024 IVIG added for suppressed IgG levels and increased  "infections    Current Chemo Regime/TX: Darzalex faspro injection 1800mg subcutaneous per protocol and Pomalyst 1 mg days 1-21 of 28 day cycle given with weekly Dex  Current Cycle: C17  Completed cycles: 16  **Pom added C2 (4/29)     Previous treatment:     Velcade 1.3 mg/m2 days 1, 4, 8 and 11 with Decadron 40 mg days 1, 8 and 15 x 4 cycles;     Velcade 1.5mg.m2, cyclophosphamide 150mg every 7 days on days 1,8,15 and 22 with decadron 40mg days 1,8,15,22  Darzalex faspro injection 1800mg subcutaneous per protocol    Past Medical History:   Diagnosis Date    Arthritis     Cyst of breast     Depressive disorder     Diabetes mellitus, type 2 (H) 01/18/2021    Essential hypertension 10/01/2015    Major depressive disorder, recurrent episode, mild 10/01/2015    Mixed hyperlipidemia 7/5/2013    Multiple myeloma not having achieved remission (H) 06/24/2019    Other specified disorders of bladder 07/09/2012    Irritable Bladder    Seasonal allergies 10/01/2015    Unspecified essential hypertension 03/19/2007    Unspecified sinusitis (chronic) 09/05/2007       Past Surgical History:   Procedure Laterality Date    APPENDECTOMY      BONE MARROW BIOPSY, BONE SPECIMEN, NEEDLE/TROCAR N/A 06/18/2019    Procedure: BONE MARROW BIOPSY;  Surgeon: Maciej Sanz MD;  Location: [facility]    CHOLECYSTECTOMY      COLONOSCOPY  07/18/2006    repeat 10 years    COLONOSCOPY N/A 12/30/2016    Procedure: COLONOSCOPY;  Surgeon: Bhaskar Franklin DO;  Location: [facility]    PUNC/ASPIR BREAST CYST Left 1995    benign    SINUS SURGERY      TUBAL STERILIZATION         Family History   Problem Relation Age of Onset    Breast Cancer Mother 60        Cause of Death; 1 breast    Parkinsonism Father         \"Possible\"    Coronary Artery Disease Father     Pacemaker Father     Thyroid Disease Daughter     Breast Cancer Maternal Aunt         over 50    Diabetes No family hx of     Hypertension No family hx of     Hyperlipidemia No family hx of     " Cerebrovascular Disease No family hx of     Colon Cancer No family hx of     Prostate Cancer No family hx of     Genetic Disorder No family hx of     Asthma No family hx of     Anesthesia Reaction No family hx of        Social History     Socioeconomic History    Marital status:      Spouse name: Not on file    Number of children: Not on file    Years of education: Not on file    Highest education level: Not on file   Occupational History    Occupation: Financial     Comment:  - (FT)   Tobacco Use    Smoking status: Never    Smokeless tobacco: Never    Tobacco comments:     Tried to Quit (YES); QUIT in 1971; Passive Exposure (NO)   Vaping Use    Vaping status: Never Used   Substance and Sexual Activity    Alcohol use: Yes     Comment: RARELY    Drug use: No    Sexual activity: Yes     Partners: Male     Birth control/protection: None   Other Topics Concern     Service Not Asked    Blood Transfusions Not Asked    Caffeine Concern Yes     Comment: Coffee >6 cups daily    Occupational Exposure Not Asked    Hobby Hazards Not Asked    Sleep Concern Not Asked    Stress Concern Not Asked    Weight Concern Not Asked    Special Diet Not Asked    Back Care Not Asked    Exercise Not Asked    Bike Helmet Not Asked    Seat Belt Not Asked    Self-Exams Not Asked    Parent/sibling w/ CABG, MI or angioplasty before 65F 55M? No   Social History Narrative    Not on file     Social Drivers of Health     Financial Resource Strain: Low Risk  (1/7/2025)    Financial Resource Strain     Within the past 12 months, have you or your family members you live with been unable to get utilities (heat, electricity) when it was really needed?: No   Food Insecurity: Low Risk  (1/7/2025)    Food Insecurity     Within the past 12 months, did you worry that your food would run out before you got money to buy more?: No     Within the past 12 months, did the food you bought just not last and you didn t have money to get  more?: No   Transportation Needs: Low Risk  (1/7/2025)    Transportation Needs     Within the past 12 months, has lack of transportation kept you from medical appointments, getting your medicines, non-medical meetings or appointments, work, or from getting things that you need?: No   Physical Activity: Not on file   Stress: Not on file   Social Connections: Not on file   Interpersonal Safety: Low Risk  (7/7/2025)    Interpersonal Safety     Do you feel physically and emotionally safe where you currently live?: Yes     Within the past 12 months, have you been hit, slapped, kicked or otherwise physically hurt by someone?: No     Within the past 12 months, have you been humiliated or emotionally abused in other ways by your partner or ex-partner?: No   Housing Stability: Low Risk  (1/7/2025)    Housing Stability     Do you have housing? : Yes     Are you worried about losing your housing?: No       Current Outpatient Medications   Medication Sig Dispense Refill    acyclovir (ZOVIRAX) 400 MG tablet Take 1 tablet (400 mg) by mouth 2 times daily. Take one tablet my mouth twice daily 120 tablet 1    aspirin (ASA) 81 MG chewable tablet Take 81 mg by mouth daily      atorvastatin (LIPITOR) 20 MG tablet TAKE 1 TABLET BY MOUTH DAILY 90 tablet 3    cetirizine (ZYRTEC) 10 MG tablet Take 1 tablet (10 mg) by mouth daily 30 tablet 1    dexAMETHasone (DECADRON) 4 MG tablet Take 5 tablets (20 mg) on Days 8, 15, and 22. 60 tablet 2    diphenhydrAMINE (BENADRYL) 25 MG capsule Take 25 mg by mouth daily.      fluticasone (FLONASE) 50 MCG/ACT nasal spray SPRAY 2 SPRAYS INTO BOTH NOSTRILS DAILY 48 mL 0    hydrocortisone (CORTAID) 1 % external cream APPLY SPARINGLY OVER RASH TO FACE AND CHEST TOPICALLY TWICE DAILY 45 g 0    losartan (COZAAR) 50 MG tablet TAKE 1 TABLET BY MOUTH DAILY 90 tablet 3    pomalidomide (POMALYST) 1 MG capsule Take 1 capsule (1 mg) by mouth daily for 21 days Day 1 thru 21 of each 28 day cycle. Swallow whole with  "water. 21 capsule 0    prochlorperazine (COMPAZINE) 10 MG tablet Take 1 tablet (10 mg) by mouth every 6 hours as needed for nausea or vomiting 30 tablet 2    sertraline (ZOLOFT) 50 MG tablet TAKE 1/2 TABLET BY MOUTH DAILY 45 tablet 3    L-Lysine HCl 500 MG CAPS Take by mouth daily. (Patient not taking: Reported on 7/7/2025)      loperamide (IMODIUM) 2 MG capsule Take 2 mg by mouth 4 times daily as needed for diarrhea. (Patient not taking: No sig reported)       No current facility-administered medications for this visit.        Allergies   Allergen Reactions    Lisinopril Cough    Phenylephrine Hcl Other (See Comments)     **Entex  HEADACHE (SEVERE)    Phenylpropanolamine Other (See Comments)     **Entex  HEADACHE (SEVERE)    Pseudoephedrine Tannate Other (See Comments)     **Entex  HEADACHE (SEVERE)    Levofloxacin Rash     **Levaquin    Moxifloxacin Hcl [Moxifloxacin Hcl In Nacl] Rash       Review Of Systems:  A complete review of systems is negative except for the above mentioned items in the interval history.     ECOG PS: 0    Physical Exam:  /70   Pulse 80   Temp 98.1  F (36.7  C) (Tympanic)   Resp 20   Ht 1.6 m (5' 3\")   Wt 76.3 kg (168 lb 3.4 oz)   SpO2 96%   BMI 29.80 kg/m    GENERAL APPEARANCE: Healthy, alert and in no acute distress.  HEENT: Eyes appear normal without scleral icterus. Extraocular movements intact.   NECK:   Supple with normal range of motion. No asymmetry or masses.  RESP: Lungs clear to auscultation bilaterally, respirations regular and easy.  CARDIOVASCULAR: Regular rate and rhythm. Normal S1, S2; no murmur, gallop, or rub.  MUSCULOSKELETAL: Extremities without gross deformities noted. No edema of bilateral lower extremities.  SKIN: No suspicious lesions or rashes.  NEURO: Alert and oriented x 3.  Gait steady.  PSYCHIATRIC: Mentation and affect appear normal.  Mood appropriate.      Laboratory:  Component      Latest Ref Rng 6/30/2025  10:10 AM   WBC      4.0 - 11.0 10e3/uL  "   RBC Count      3.80 - 5.20 10e6/uL    Hemoglobin      11.7 - 15.7 g/dL    Hematocrit      35.0 - 47.0 %    MCV      78 - 100 fL    MCH      26.5 - 33.0 pg    MCHC      31.5 - 36.5 g/dL    RDW      10.0 - 15.0 %    Platelet Count      150 - 450 10e3/uL    % Neutrophils      %    % Lymphocytes      %    % Monocytes      %    % Eosinophils      %    % Basophils      %    % Immature Granulocytes      %    NRBC/W      <1 /100    Absolute Neutrophil      1.6 - 8.3 10e3/uL    Absolute Lymphocytes      0.8 - 5.3 10e3/uL    Absolute Monocytes      0.0 - 1.3 10e3/uL    Absolute Eosinophils      0.0 - 0.7 10e3/uL    Absolute Basophils      0.0 - 0.2 10e3/uL    Absolute Immature Granulocytes      <=0.4 10e3/uL    Absolute NRBCs      10e3/uL    Albumin Fraction      3.7 - 5.1 g/dL 3.7    Alpha 1 Fraction      0.2 - 0.4 g/dL 0.3    Alpha 2 Fraction      0.5 - 0.9 g/dL 0.7    Beta Fraction      0.6 - 1.0 g/dL 0.7    Gamma Fraction      0.7 - 1.6 g/dL 0.6 (L)    Monoclonal Peak      <=0.0 g/dL 0.0    ELP Interpretation: Essentially normal electrophoretic pattern. No obvious monoclonal proteins seen. Pathologic significance requires clinical correlation. KENDRA Akins M.D., Ph.D., Pathologist.    IGG      610 - 1,616 mg/dL 613    IGA      84 - 499 mg/dL 34 (L)    IGM      35 - 242 mg/dL 39    [place] Free Lt Chain      0.33 - 1.94 mg/dL 0.66    Lambda Free Lt Chain      0.57 - 2.63 mg/dL 0.40 (L)    Kappa Lambda Ratio      0.26 - 1.65  1.65    Beta-2-Microglobulin      <2.3 mg/L 2.3 (H)    Viscosity Index      1.4 - 1.8  1.4    Immunofixation ELP No monoclonal protein seen on immunofixation. Pathologic significance requires clinical correlation. KENDRA Akins M.D., Ph.D., Pathologist    Total Protein Serum for ELP      6.4 - 8.3 g/dL 6.0 (L)      Component      Latest Ref Rng 7/7/2025  8:10 AM   WBC      4.0 - 11.0 10e3/uL 6.3    RBC Count      3.80 - 5.20 10e6/uL 4.34    Hemoglobin      11.7 - 15.7 g/dL 13.3    Hematocrit      35.0  - 47.0 % 39.9    MCV      78 - 100 fL 92    MCH      26.5 - 33.0 pg 30.6    MCHC      31.5 - 36.5 g/dL 33.3    RDW      10.0 - 15.0 % 15.4 (H)    Platelet Count      150 - 450 10e3/uL 214    % Neutrophils      % 50    % Lymphocytes      % 29    % Monocytes      % 14    % Eosinophils      % 4    % Basophils      % 2    % Immature Granulocytes      % 1    NRBC/W      <1 /100 0    Absolute Neutrophil      1.6 - 8.3 10e3/uL 3.2    Absolute Lymphocytes      0.8 - 5.3 10e3/uL 1.9    Absolute Monocytes      0.0 - 1.3 10e3/uL 0.9    Absolute Eosinophils      0.0 - 0.7 10e3/uL 0.3    Absolute Basophils      0.0 - 0.2 10e3/uL 0.1    Absolute Immature Granulocytes      <=0.4 10e3/uL 0.1    Absolute NRBCs      10e3/uL 0.0    Albumin Fraction      3.7 - 5.1 g/dL    Alpha 1 Fraction      0.2 - 0.4 g/dL    Alpha 2 Fraction      0.5 - 0.9 g/dL    Beta Fraction      0.6 - 1.0 g/dL    Gamma Fraction      0.7 - 1.6 g/dL    Monoclonal Peak      <=0.0 g/dL    ELP Interpretation:    IGG      610 - 1,616 mg/dL    IGA      84 - 499 mg/dL    IGM      35 - 242 mg/dL    Kappa Free Lt Chain      0.33 - 1.94 mg/dL    Lambda Free Lt Chain      0.57 - 2.63 mg/dL    Kappa Lambda Ratio      0.26 - 1.65     Beta-2-Microglobulin      <2.3 mg/L    Viscosity Index      1.4 - 1.8     Immunofixation ELP    Total Protein Serum for ELP      6.4 - 8.3 g/dL       Legend:  (L) Low  (H) High      Imaging Studies:  None this visit.     ASSESSMENT/PLAN:  #1 Multiple myeloma IgG kappa multiple myeloma with 80% involvement of the bone marrow diagnosed June 2019 initially treated with Velcade and Decadron and received 4 cycles with increasing M-spike and kappa/lambda ratio.  Treatment changed to CyBorD on 10/1/2019.  M-spike plateau at 1.2 and treatment changed to monotherapy with Darzalex Faspro injection. M spike declined to 1.0 then increased again to 1.2 so Velcade and Dex added to her treatment plan with cycle 5 therapy. Following C22 (April 2022), she did  initiate a treatment holiday when her Mspike was 0.3.     We had followed her counts for over a year. Unfortunately, she did see a rise in her mspike to 0.8. PET completed was negative. Dr. Walker felt this was a biochemical progression and after discussion with patient, elected to resume treatment with Darzalex faspro. Mspike began to rise. PET was negative but feeling like she wasn't responding to Darzalex alone, decided to add in Pomalyst 1 mg p.o. daily on days 1 through 21 to begin with cycle 2 in addition to dexamethasone 20 mg p.o. on days 1 ,8,15,22.    Today, doing well. No new concerns. No detectable monoclonal protein. Stable light chains. I see no contraindications to proceeding with therapy today. She will continue Darzalex q28 days. Dex weekly. Pom for 21 days (due to start tomorrow). Will repeat myeloma labs in 3 weeks, I will see her in 4 weeks, sooner with concerns.     #2 Lytic lesions Hx lytic lesion at L3. Zometa Q3M, due today. She remains on calcium with vitamin D twice a day. Weight bearing activity.    #3 Hypogammaglobulinemia Hx decreased IgG levels and recurrent ear infections, Dr. Walker elected to commence IVIG. This is to be given every 4 weeks for IgG levels less than 600. Not needed this month with IgG levels greater than 600.          Patient in agreement with plan and verbalizes understanding. Agrees to call with any questions or concerns.      41 minutes spent in the patient's encounter today with time spent in review of patient's chart along with chart preparation and review of the treatment plan and signing of treatment plan.  Time was also spent with the patient in obtaining a review of systems and performing a physical exam along with detailed review of all test results. Time was also spent in discussing plan for future follow-up and relating instructions for follow-up and in placing future orders.    The longitudinal plan of care for the diagnosis(es)/condition(s) as  documented were addressed during this visit. Due to the added complexity in care, I will continue to support Carmelita in the subsequent management and with ongoing continuity of care.        FLO Duque, FNP-C

## 2025-07-07 NOTE — PROGRESS NOTES
Infusion Nursing Note:  Carmelita Watts presents today for faspro/zometa.    Patient seen by provider today: Yes: Iram Gonzalez   present during visit today: Not Applicable.    Note: N/A.      Intravenous Access:  Peripheral IV placed.    Treatment Conditions:  Lab Results   Component Value Date    HGB 13.3 07/07/2025    WBC 6.3 07/07/2025    ANEU 3.2 07/07/2025     07/07/2025        Lab Results   Component Value Date     07/07/2025    POTASSIUM 3.9 07/07/2025    MAG 2.1 04/07/2025    CR 0.83 07/07/2025    PAYAL 9.3 07/07/2025    BILITOTAL 0.5 07/07/2025    ALBUMIN 3.9 07/07/2025    ALT 15 07/07/2025    AST 18 07/07/2025       Results reviewed, labs MET treatment parameters, ok to proceed with treatment.      Post Infusion Assessment:  Patient tolerated infusion without incident.  Patient tolerated injection without incident.  Blood return noted pre and post infusion.  Site patent and intact, free from redness, edema or discomfort.  No evidence of extravasations.  Access discontinued per protocol.       Discharge Plan:   Patient and/or family verbalized understanding of discharge instructions and all questions answered.      TERRENCE GUSMAN

## 2025-07-07 NOTE — NURSING NOTE
"Oncology Rooming Note    July 7, 2025 8:41 AM   Carmelita Watts is a 74 year old female who presents for:    Chief Complaint   Patient presents with    Oncology Clinic Visit     Follow up Multiple Myeloma     Initial Vitals: /70   Pulse 80   Temp 98.1  F (36.7  C) (Tympanic)   Resp 20   Ht 1.6 m (5' 3\")   Wt 76.3 kg (168 lb 3.4 oz)   SpO2 96%   BMI 29.80 kg/m   Estimated body mass index is 29.8 kg/m  as calculated from the following:    Height as of this encounter: 1.6 m (5' 3\").    Weight as of this encounter: 76.3 kg (168 lb 3.4 oz). Body surface area is 1.84 meters squared.  No Pain (0) Comment: Data Unavailable   No LMP recorded. Patient is postmenopausal.  Allergies reviewed: Yes  Medications reviewed: Yes    Medications: MEDICATION REFILLS NEEDED TODAY. Provider was notified.  Pharmacy name entered into [facility]:    [id number] - [place], [place] - [address] AT [facility] [address] [place] [address] PHARMACY [facility] [place], [place] - [address]    Frailty Screening:   Is the patient here for a new oncology consult visit in cancer care? 2. No    PHQ9:  Did this patient require a PHQ9?: No            Vilma Chi LPN             "

## 2025-07-21 ENCOUNTER — TRANSFERRED RECORDS (OUTPATIENT)
Dept: HEALTH INFORMATION MANAGEMENT | Facility: CLINIC | Age: 75
End: 2025-07-21
Payer: COMMERCIAL

## 2025-07-28 ENCOUNTER — LAB (OUTPATIENT)
Dept: LAB | Facility: OTHER | Age: 75
End: 2025-07-28
Payer: MEDICARE

## 2025-07-28 DIAGNOSIS — C90.00 MULTIPLE MYELOMA NOT HAVING ACHIEVED REMISSION (H): Primary | ICD-10-CM

## 2025-07-28 DIAGNOSIS — C90.00 MULTIPLE MYELOMA NOT HAVING ACHIEVED REMISSION (H): ICD-10-CM

## 2025-07-28 LAB
ALBUMIN SERPL BCG-MCNC: 3.9 G/DL (ref 3.5–5.2)
ALP SERPL-CCNC: 132 U/L (ref 40–150)
ALT SERPL W P-5'-P-CCNC: 14 U/L (ref 0–50)
ANION GAP SERPL CALCULATED.3IONS-SCNC: 10 MMOL/L (ref 7–15)
AST SERPL W P-5'-P-CCNC: 16 U/L (ref 0–45)
BASOPHILS # BLD AUTO: 0.1 10E3/UL (ref 0–0.2)
BASOPHILS NFR BLD AUTO: 2 %
BILIRUB SERPL-MCNC: 0.4 MG/DL
BUN SERPL-MCNC: 10.5 MG/DL (ref 8–23)
CALCIUM SERPL-MCNC: 9.1 MG/DL (ref 8.8–10.4)
CHLORIDE SERPL-SCNC: 105 MMOL/L (ref 98–107)
CREAT SERPL-MCNC: 0.74 MG/DL (ref 0.51–0.95)
EGFRCR SERPLBLD CKD-EPI 2021: 83 ML/MIN/1.73M2
EOSINOPHIL # BLD AUTO: 0.4 10E3/UL (ref 0–0.7)
EOSINOPHIL NFR BLD AUTO: 10 %
ERYTHROCYTE [DISTWIDTH] IN BLOOD BY AUTOMATED COUNT: 15.5 % (ref 10–15)
GLUCOSE SERPL-MCNC: 150 MG/DL (ref 70–99)
HCO3 SERPL-SCNC: 23 MMOL/L (ref 22–29)
HCT VFR BLD AUTO: 38.6 % (ref 35–47)
HGB BLD-MCNC: 12.9 G/DL (ref 11.7–15.7)
IMM GRANULOCYTES # BLD: 0.1 10E3/UL
IMM GRANULOCYTES NFR BLD: 1 %
LYMPHOCYTES # BLD AUTO: 1.6 10E3/UL (ref 0.8–5.3)
LYMPHOCYTES NFR BLD AUTO: 36 %
MCH RBC QN AUTO: 30.4 PG (ref 26.5–33)
MCHC RBC AUTO-ENTMCNC: 33.4 G/DL (ref 31.5–36.5)
MCV RBC AUTO: 91 FL (ref 78–100)
MONOCYTES # BLD AUTO: 0.6 10E3/UL (ref 0–1.3)
MONOCYTES NFR BLD AUTO: 14 %
NEUTROPHILS # BLD AUTO: 1.6 10E3/UL (ref 1.6–8.3)
NEUTROPHILS NFR BLD AUTO: 37 %
NRBC # BLD AUTO: 0 10E3/UL
NRBC BLD AUTO-RTO: 0 /100
PLATELET # BLD AUTO: 185 10E3/UL (ref 150–450)
POTASSIUM SERPL-SCNC: 4.3 MMOL/L (ref 3.4–5.3)
PROT SERPL-MCNC: 5.7 G/DL (ref 6.4–8.3)
PROT SERPL-MCNC: 6 G/DL (ref 6.4–8.3)
RBC # BLD AUTO: 4.24 10E6/UL (ref 3.8–5.2)
SODIUM SERPL-SCNC: 138 MMOL/L (ref 135–145)
WBC # BLD AUTO: 4.3 10E3/UL (ref 4–11)

## 2025-07-28 PROCEDURE — 82784 ASSAY IGA/IGD/IGG/IGM EACH: CPT | Mod: ZL

## 2025-07-28 PROCEDURE — 82435 ASSAY OF BLOOD CHLORIDE: CPT | Mod: ZL

## 2025-07-28 PROCEDURE — 85004 AUTOMATED DIFF WBC COUNT: CPT | Mod: ZL

## 2025-07-28 PROCEDURE — 86334 IMMUNOFIX E-PHORESIS SERUM: CPT | Mod: ZL | Performed by: PATHOLOGY

## 2025-07-28 PROCEDURE — 84165 PROTEIN E-PHORESIS SERUM: CPT | Mod: ZL | Performed by: PATHOLOGY

## 2025-07-28 PROCEDURE — 82232 ASSAY OF BETA-2 PROTEIN: CPT | Mod: ZL

## 2025-07-28 PROCEDURE — 85810 BLOOD VISCOSITY EXAMINATION: CPT | Mod: ZL

## 2025-07-28 PROCEDURE — 85014 HEMATOCRIT: CPT | Mod: ZL

## 2025-07-28 PROCEDURE — 84155 ASSAY OF PROTEIN SERUM: CPT | Mod: ZL

## 2025-07-28 PROCEDURE — 83521 IG LIGHT CHAINS FREE EACH: CPT | Mod: ZL

## 2025-07-28 PROCEDURE — 36415 COLL VENOUS BLD VENIPUNCTURE: CPT | Mod: ZL

## 2025-07-28 RX ORDER — DEXAMETHASONE 4 MG/1
TABLET ORAL
Qty: 15 TABLET | Refills: 0 | Status: SHIPPED | OUTPATIENT
Start: 2025-07-28

## 2025-07-29 ENCOUNTER — TELEPHONE (OUTPATIENT)
Dept: ONCOLOGY | Facility: CLINIC | Age: 75
End: 2025-07-29
Payer: COMMERCIAL

## 2025-07-29 ENCOUNTER — DOCUMENTATION ONLY (OUTPATIENT)
Dept: PHARMACY | Facility: CLINIC | Age: 75
End: 2025-07-29
Payer: COMMERCIAL

## 2025-07-29 LAB
ALBUMIN SERPL ELPH-MCNC: 3.5 G/DL (ref 3.7–5.1)
ALPHA1 GLOB SERPL ELPH-MCNC: 0.3 G/DL (ref 0.2–0.4)
ALPHA2 GLOB SERPL ELPH-MCNC: 0.8 G/DL (ref 0.5–0.9)
B-GLOBULIN SERPL ELPH-MCNC: 0.6 G/DL (ref 0.6–1)
B2 MICROGLOB TUMOR MARKER SER-MCNC: 2.2 MG/L
GAMMA GLOB SERPL ELPH-MCNC: 0.5 G/DL (ref 0.7–1.6)
IGA SERPL-MCNC: 27 MG/DL (ref 84–499)
IGG SERPL-MCNC: 475 MG/DL (ref 610–1616)
IGM SERPL-MCNC: 28 MG/DL (ref 35–242)
KAPPA LC FREE SER-MCNC: 0.36 MG/DL (ref 0.33–1.94)
KAPPA LC FREE/LAMBDA FREE SER NEPH: 0.57 {RATIO} (ref 0.26–1.65)
LAMBDA LC FREE SERPL-MCNC: 0.63 MG/DL (ref 0.57–2.63)
LOCATION OF TASK: ABNORMAL
LOCATION OF TASK: NORMAL
M PROTEIN SERPL ELPH-MCNC: 0 G/DL
PROT PATTERN SERPL ELPH-IMP: ABNORMAL
PROT PATTERN SERPL IFE-IMP: NORMAL
VISC SER: 1.4 CP (ref 1.4–1.8)

## 2025-07-29 NOTE — PROGRESS NOTES
Pharmacist IVIG Stewardship Program    Diagnosis: Hypogammaglobulinemia   Date  12/2/2024   IgG Level (mg/dL) 375     Current Dosing Regimen: Privigen 20g IV every 4 weeks PRN for IgG <600 mg/dL   Patient is dosed as needed based on an IgG level of less than 600 mg/dL to ensure the lowest amount of medication is administered to achieve beneficial outcomes.  Pre-medications:   Acetaminophen 650 mg by mouth, 30 minutes prior to infusion  Diphenhydramine 50 mg by mouth, 30 minutes prior to infusion  Previously Tried Products: None     Side Effects: Unable to reach patient to discuss.     Interventions: Lab review completed.     Assessment:   Date  7/28/2025   IgG Level (mg/dL) 475   Patient is appropriate for IVIG therapy when their level is within parameter. IVIG infusions are effective in increasing IgG levels to an appropriate level to minimize patient's risk of infection. Patient should continue on treatment as they have been per provider order. Without therapy their levels would put them at an increased risk of infections.    Plan: Patient is okay to proceed with IVIG infusion on 8/4/25 as her level is below 600 mg/dL. Message was sent to care team informing them that patient is okay to be scheduled. Patient will have additional labs drawn next month to assess the need for ongoing infusions. Patient should not have levels drawn on the same day as IVIG infusion.      Next Review Needed: 8/2025 (end of the month)    Karen Menon, PharmD, IgCP  Medication Access Pharmacist

## 2025-07-29 NOTE — TELEPHONE ENCOUNTER
Oral Chemotherapy Monitoring Program     Medication: Pomalyst  Rx: 1mg PO daily on days 1 through 21 of 28 day cycle   REMS auth [id number]  Routine survey questions reviewed  Rx to be Escribed to [facility]

## 2025-08-04 ENCOUNTER — INFUSION THERAPY VISIT (OUTPATIENT)
Dept: INFUSION THERAPY | Facility: OTHER | Age: 75
End: 2025-08-04
Attending: NURSE PRACTITIONER
Payer: MEDICARE

## 2025-08-04 ENCOUNTER — ONCOLOGY VISIT (OUTPATIENT)
Dept: ONCOLOGY | Facility: OTHER | Age: 75
End: 2025-08-04
Attending: NURSE PRACTITIONER
Payer: MEDICARE

## 2025-08-04 ENCOUNTER — LAB (OUTPATIENT)
Dept: LAB | Facility: OTHER | Age: 75
End: 2025-08-04
Attending: NURSE PRACTITIONER
Payer: MEDICARE

## 2025-08-04 VITALS — SYSTOLIC BLOOD PRESSURE: 132 MMHG | DIASTOLIC BLOOD PRESSURE: 71 MMHG | HEART RATE: 72 BPM

## 2025-08-04 VITALS
OXYGEN SATURATION: 98 % | SYSTOLIC BLOOD PRESSURE: 120 MMHG | TEMPERATURE: 98.3 F | WEIGHT: 168.43 LBS | BODY MASS INDEX: 29.84 KG/M2 | HEART RATE: 64 BPM | HEIGHT: 63 IN | DIASTOLIC BLOOD PRESSURE: 74 MMHG

## 2025-08-04 DIAGNOSIS — C90.00 MULTIPLE MYELOMA NOT HAVING ACHIEVED REMISSION (H): ICD-10-CM

## 2025-08-04 DIAGNOSIS — M89.9 BONE LESION: ICD-10-CM

## 2025-08-04 DIAGNOSIS — C90.00 MULTIPLE MYELOMA NOT HAVING ACHIEVED REMISSION (H): Primary | ICD-10-CM

## 2025-08-04 DIAGNOSIS — D80.1 HYPOGAMMAGLOBULINEMIA: Primary | ICD-10-CM

## 2025-08-04 DIAGNOSIS — D80.1 HYPOGAMMAGLOBULINEMIA: ICD-10-CM

## 2025-08-04 LAB
ALBUMIN SERPL BCG-MCNC: 4 G/DL (ref 3.5–5.2)
ALP SERPL-CCNC: 128 U/L (ref 40–150)
ALT SERPL W P-5'-P-CCNC: 14 U/L (ref 0–50)
ANION GAP SERPL CALCULATED.3IONS-SCNC: 11 MMOL/L (ref 7–15)
AST SERPL W P-5'-P-CCNC: 17 U/L (ref 0–45)
BASOPHILS # BLD AUTO: 0.1 10E3/UL (ref 0–0.2)
BASOPHILS NFR BLD AUTO: 2 %
BILIRUB SERPL-MCNC: 0.5 MG/DL
BUN SERPL-MCNC: 11.3 MG/DL (ref 8–23)
CALCIUM SERPL-MCNC: 9.2 MG/DL (ref 8.8–10.4)
CHLORIDE SERPL-SCNC: 105 MMOL/L (ref 98–107)
CREAT SERPL-MCNC: 0.79 MG/DL (ref 0.51–0.95)
EGFRCR SERPLBLD CKD-EPI 2021: 77 ML/MIN/1.73M2
EOSINOPHIL # BLD AUTO: 0.3 10E3/UL (ref 0–0.7)
EOSINOPHIL NFR BLD AUTO: 5 %
ERYTHROCYTE [DISTWIDTH] IN BLOOD BY AUTOMATED COUNT: 15.3 % (ref 10–15)
GLUCOSE SERPL-MCNC: 150 MG/DL (ref 70–99)
HCO3 SERPL-SCNC: 22 MMOL/L (ref 22–29)
HCT VFR BLD AUTO: 38.4 % (ref 35–47)
HGB BLD-MCNC: 13.2 G/DL (ref 11.7–15.7)
IMM GRANULOCYTES # BLD: 0 10E3/UL
IMM GRANULOCYTES NFR BLD: 1 %
LYMPHOCYTES # BLD AUTO: 2.1 10E3/UL (ref 0.8–5.3)
LYMPHOCYTES NFR BLD AUTO: 35 %
MCH RBC QN AUTO: 31.1 PG (ref 26.5–33)
MCHC RBC AUTO-ENTMCNC: 34.4 G/DL (ref 31.5–36.5)
MCV RBC AUTO: 90 FL (ref 78–100)
MONOCYTES # BLD AUTO: 0.8 10E3/UL (ref 0–1.3)
MONOCYTES NFR BLD AUTO: 14 %
NEUTROPHILS # BLD AUTO: 2.6 10E3/UL (ref 1.6–8.3)
NEUTROPHILS NFR BLD AUTO: 44 %
NRBC # BLD AUTO: 0 10E3/UL
NRBC BLD AUTO-RTO: 0 /100
PLATELET # BLD AUTO: 196 10E3/UL (ref 150–450)
POTASSIUM SERPL-SCNC: 3.9 MMOL/L (ref 3.4–5.3)
PROT SERPL-MCNC: 6.2 G/DL (ref 6.4–8.3)
RBC # BLD AUTO: 4.25 10E6/UL (ref 3.8–5.2)
SODIUM SERPL-SCNC: 138 MMOL/L (ref 135–145)
WBC # BLD AUTO: 5.9 10E3/UL (ref 4–11)

## 2025-08-04 PROCEDURE — G2211 COMPLEX E/M VISIT ADD ON: HCPCS | Performed by: NURSE PRACTITIONER

## 2025-08-04 PROCEDURE — 82247 BILIRUBIN TOTAL: CPT | Mod: ZL

## 2025-08-04 PROCEDURE — G0463 HOSPITAL OUTPT CLINIC VISIT: HCPCS | Mod: 25

## 2025-08-04 PROCEDURE — 250N000011 HC RX IP 250 OP 636: Mod: JZ | Performed by: NURSE PRACTITIONER

## 2025-08-04 PROCEDURE — 99214 OFFICE O/P EST MOD 30 MIN: CPT | Performed by: NURSE PRACTITIONER

## 2025-08-04 PROCEDURE — 36415 COLL VENOUS BLD VENIPUNCTURE: CPT | Mod: ZL

## 2025-08-04 PROCEDURE — G0463 HOSPITAL OUTPT CLINIC VISIT: HCPCS

## 2025-08-04 PROCEDURE — 85018 HEMOGLOBIN: CPT | Mod: ZL

## 2025-08-04 RX ORDER — EPINEPHRINE 1 MG/ML
0.3 INJECTION, SOLUTION, CONCENTRATE INTRAVENOUS EVERY 5 MIN PRN
Status: CANCELLED | OUTPATIENT
Start: 2025-08-04

## 2025-08-04 RX ORDER — ACETAMINOPHEN 325 MG/1
650 TABLET ORAL
OUTPATIENT
Start: 2025-08-25

## 2025-08-04 RX ORDER — EPINEPHRINE 1 MG/ML
0.3 INJECTION, SOLUTION, CONCENTRATE INTRAVENOUS EVERY 5 MIN PRN
OUTPATIENT
Start: 2025-08-25

## 2025-08-04 RX ORDER — EPINEPHRINE 1 MG/ML
0.3 INJECTION, SOLUTION, CONCENTRATE INTRAVENOUS EVERY 5 MIN PRN
OUTPATIENT
Start: 2025-08-04

## 2025-08-04 RX ORDER — HEPARIN SODIUM,PORCINE 10 UNIT/ML
5-20 VIAL (ML) INTRAVENOUS DAILY PRN
OUTPATIENT
Start: 2025-08-04

## 2025-08-04 RX ORDER — METHYLPREDNISOLONE SODIUM SUCCINATE 125 MG/2ML
125 INJECTION INTRAMUSCULAR; INTRAVENOUS
Status: CANCELLED
Start: 2025-08-04

## 2025-08-04 RX ORDER — DIPHENHYDRAMINE HYDROCHLORIDE 50 MG/ML
50 INJECTION, SOLUTION INTRAMUSCULAR; INTRAVENOUS
Status: CANCELLED
Start: 2025-08-04

## 2025-08-04 RX ORDER — ALBUTEROL SULFATE 90 UG/1
1-2 INHALANT RESPIRATORY (INHALATION)
Start: 2025-08-04

## 2025-08-04 RX ORDER — ALBUTEROL SULFATE 0.83 MG/ML
2.5 SOLUTION RESPIRATORY (INHALATION)
OUTPATIENT
Start: 2025-08-04

## 2025-08-04 RX ORDER — DEXAMETHASONE SODIUM PHOSPHATE 10 MG/ML
20 INJECTION, SOLUTION INTRAMUSCULAR; INTRAVENOUS ONCE
Start: 2025-08-25 | End: 2025-08-25

## 2025-08-04 RX ORDER — DIPHENHYDRAMINE HCL 50 MG
50 CAPSULE ORAL ONCE
OUTPATIENT
Start: 2025-08-25

## 2025-08-04 RX ORDER — DEXAMETHASONE 4 MG/1
TABLET ORAL
Qty: 15 TABLET | Refills: 0 | Status: SHIPPED | OUTPATIENT
Start: 2025-08-04

## 2025-08-04 RX ORDER — DEXAMETHASONE SODIUM PHOSPHATE 10 MG/ML
20 INJECTION, SOLUTION INTRAMUSCULAR; INTRAVENOUS ONCE
Status: CANCELLED
Start: 2025-08-04 | End: 2025-08-04

## 2025-08-04 RX ORDER — ACETAMINOPHEN 325 MG/1
650 TABLET ORAL ONCE
Status: COMPLETED | OUTPATIENT
Start: 2025-08-04 | End: 2025-08-04

## 2025-08-04 RX ORDER — HEPARIN SODIUM (PORCINE) LOCK FLUSH IV SOLN 100 UNIT/ML 100 UNIT/ML
5 SOLUTION INTRAVENOUS
OUTPATIENT
Start: 2025-08-04

## 2025-08-04 RX ORDER — METHYLPREDNISOLONE SODIUM SUCCINATE 40 MG/ML
40 INJECTION INTRAMUSCULAR; INTRAVENOUS
Start: 2025-08-04

## 2025-08-04 RX ORDER — MONTELUKAST SODIUM 10 MG/1
10 TABLET ORAL ONCE
Status: COMPLETED | OUTPATIENT
Start: 2025-08-04 | End: 2025-08-04

## 2025-08-04 RX ORDER — HUMAN IMMUNOGLOBULIN G 20 G/200ML
0.4 LIQUID INTRAVENOUS ONCE
Status: COMPLETED | OUTPATIENT
Start: 2025-08-04 | End: 2025-08-04

## 2025-08-04 RX ORDER — DEXAMETHASONE 4 MG/1
20 TABLET ORAL ONCE
OUTPATIENT
Start: 2025-08-25

## 2025-08-04 RX ORDER — DIPHENHYDRAMINE HYDROCHLORIDE 50 MG/ML
50 INJECTION, SOLUTION INTRAMUSCULAR; INTRAVENOUS
Start: 2025-08-25

## 2025-08-04 RX ORDER — DIPHENHYDRAMINE HYDROCHLORIDE 50 MG/ML
25 INJECTION, SOLUTION INTRAMUSCULAR; INTRAVENOUS
Start: 2025-08-04

## 2025-08-04 RX ORDER — MEPERIDINE HYDROCHLORIDE 25 MG/ML
25 INJECTION INTRAMUSCULAR; INTRAVENOUS; SUBCUTANEOUS EVERY 30 MIN PRN
OUTPATIENT
Start: 2025-08-25

## 2025-08-04 RX ORDER — MEPERIDINE HYDROCHLORIDE 25 MG/ML
25 INJECTION INTRAMUSCULAR; INTRAVENOUS; SUBCUTANEOUS
OUTPATIENT
Start: 2025-08-04

## 2025-08-04 RX ORDER — ACETAMINOPHEN 325 MG/1
650 TABLET ORAL
Status: CANCELLED | OUTPATIENT
Start: 2025-08-04

## 2025-08-04 RX ORDER — METHYLPREDNISOLONE SODIUM SUCCINATE 125 MG/2ML
125 INJECTION INTRAMUSCULAR; INTRAVENOUS
Start: 2025-08-25

## 2025-08-04 RX ORDER — DIPHENHYDRAMINE HYDROCHLORIDE 50 MG/ML
50 INJECTION, SOLUTION INTRAMUSCULAR; INTRAVENOUS
Start: 2025-08-04

## 2025-08-04 RX ORDER — HUMAN IMMUNOGLOBULIN G 20 G/200ML
0.4 LIQUID INTRAVENOUS ONCE
Start: 2025-08-04

## 2025-08-04 RX ORDER — ALBUTEROL SULFATE 0.83 MG/ML
2.5 SOLUTION RESPIRATORY (INHALATION)
OUTPATIENT
Start: 2025-08-25

## 2025-08-04 RX ORDER — ACETAMINOPHEN 325 MG/1
650 TABLET ORAL ONCE
Status: CANCELLED | OUTPATIENT
Start: 2025-08-04

## 2025-08-04 RX ORDER — HYDROCORTISONE SODIUM SUCCINATE 100 MG/2ML
100 INJECTION INTRAMUSCULAR; INTRAVENOUS
OUTPATIENT
Start: 2025-08-04

## 2025-08-04 RX ORDER — MONTELUKAST SODIUM 10 MG/1
10 TABLET ORAL ONCE
Status: CANCELLED | OUTPATIENT
Start: 2025-08-04

## 2025-08-04 RX ORDER — ACETAMINOPHEN 325 MG/1
650 TABLET ORAL ONCE
Start: 2025-08-04

## 2025-08-04 RX ORDER — DEXAMETHASONE 4 MG/1
20 TABLET ORAL ONCE
Status: CANCELLED | OUTPATIENT
Start: 2025-08-04

## 2025-08-04 RX ORDER — DEXAMETHASONE 4 MG/1
20 TABLET ORAL ONCE
Status: COMPLETED | OUTPATIENT
Start: 2025-08-04 | End: 2025-08-04

## 2025-08-04 RX ORDER — LORAZEPAM 2 MG/ML
0.5 INJECTION INTRAMUSCULAR EVERY 4 HOURS PRN
OUTPATIENT
Start: 2025-08-25

## 2025-08-04 RX ORDER — ALBUTEROL SULFATE 90 UG/1
1-2 INHALANT RESPIRATORY (INHALATION)
Status: CANCELLED
Start: 2025-08-04

## 2025-08-04 RX ORDER — DIPHENHYDRAMINE HCL 50 MG
50 CAPSULE ORAL
Status: CANCELLED | OUTPATIENT
Start: 2025-08-04

## 2025-08-04 RX ORDER — DIPHENHYDRAMINE HCL 50 MG
50 CAPSULE ORAL
OUTPATIENT
Start: 2025-08-25

## 2025-08-04 RX ORDER — ALBUTEROL SULFATE 0.83 MG/ML
2.5 SOLUTION RESPIRATORY (INHALATION)
Status: CANCELLED | OUTPATIENT
Start: 2025-08-04

## 2025-08-04 RX ORDER — MEPERIDINE HYDROCHLORIDE 25 MG/ML
25 INJECTION INTRAMUSCULAR; INTRAVENOUS; SUBCUTANEOUS EVERY 30 MIN PRN
Status: CANCELLED | OUTPATIENT
Start: 2025-08-04

## 2025-08-04 RX ORDER — DIPHENHYDRAMINE HCL 50 MG
50 CAPSULE ORAL ONCE
Status: CANCELLED | OUTPATIENT
Start: 2025-08-04

## 2025-08-04 RX ORDER — LORAZEPAM 2 MG/ML
0.5 INJECTION INTRAMUSCULAR EVERY 4 HOURS PRN
Status: CANCELLED | OUTPATIENT
Start: 2025-08-04

## 2025-08-04 RX ORDER — ACETAMINOPHEN 325 MG/1
650 TABLET ORAL ONCE
OUTPATIENT
Start: 2025-08-25

## 2025-08-04 RX ORDER — MONTELUKAST SODIUM 10 MG/1
10 TABLET ORAL ONCE
OUTPATIENT
Start: 2025-08-25

## 2025-08-04 RX ORDER — DIPHENHYDRAMINE HCL 50 MG
50 CAPSULE ORAL ONCE
Start: 2025-08-04

## 2025-08-04 RX ORDER — ALBUTEROL SULFATE 90 UG/1
1-2 INHALANT RESPIRATORY (INHALATION)
Start: 2025-08-25

## 2025-08-04 RX ORDER — DIPHENHYDRAMINE HCL 50 MG
50 CAPSULE ORAL ONCE
Status: COMPLETED | OUTPATIENT
Start: 2025-08-04 | End: 2025-08-04

## 2025-08-04 RX ADMIN — ACETAMINOPHEN 650 MG: 325 TABLET ORAL at 09:25

## 2025-08-04 RX ADMIN — HUMAN IMMUNOGLOBULIN G 20 G: 20 LIQUID INTRAVENOUS at 10:00

## 2025-08-04 RX ADMIN — Medication 250 ML: at 10:00

## 2025-08-04 RX ADMIN — MONTELUKAST SODIUM 10 MG: 10 TABLET ORAL at 09:25

## 2025-08-04 RX ADMIN — Medication 50 MG: at 09:25

## 2025-08-04 RX ADMIN — DEXAMETHASONE 20 MG: 4 TABLET ORAL at 09:25

## 2025-08-04 RX ADMIN — DARATUMUMAB AND HYALURONIDASE-FIHJ (HUMAN RECOMBINANT) 1800 MG: 1800; 30000 INJECTION SUBCUTANEOUS at 10:03

## 2025-08-04 ASSESSMENT — PAIN SCALES - GENERAL: PAINLEVEL_OUTOF10: NO PAIN (0)

## 2025-08-25 ENCOUNTER — LAB (OUTPATIENT)
Dept: LAB | Facility: OTHER | Age: 75
End: 2025-08-25
Payer: MEDICARE

## 2025-08-25 DIAGNOSIS — C90.00 MULTIPLE MYELOMA NOT HAVING ACHIEVED REMISSION (H): Primary | ICD-10-CM

## 2025-08-25 RX ORDER — DEXAMETHASONE 4 MG/1
TABLET ORAL
Qty: 15 TABLET | Refills: 0 | Status: SHIPPED | OUTPATIENT
Start: 2025-09-29

## 2025-08-26 ENCOUNTER — TELEPHONE (OUTPATIENT)
Dept: ONCOLOGY | Facility: CLINIC | Age: 75
End: 2025-08-26
Payer: COMMERCIAL

## 2025-08-26 ENCOUNTER — DOCUMENTATION ONLY (OUTPATIENT)
Dept: PHARMACY | Facility: CLINIC | Age: 75
End: 2025-08-26
Payer: COMMERCIAL

## 2025-09-02 ENCOUNTER — INFUSION THERAPY VISIT (OUTPATIENT)
Dept: INFUSION THERAPY | Facility: OTHER | Age: 75
End: 2025-09-02
Attending: NURSE PRACTITIONER
Payer: MEDICARE

## 2025-09-02 ENCOUNTER — DOCUMENTATION ONLY (OUTPATIENT)
Dept: ONCOLOGY | Facility: OTHER | Age: 75
End: 2025-09-02

## 2025-09-02 VITALS
RESPIRATION RATE: 18 BRPM | WEIGHT: 171.96 LBS | HEIGHT: 63 IN | SYSTOLIC BLOOD PRESSURE: 141 MMHG | HEART RATE: 72 BPM | BODY MASS INDEX: 30.47 KG/M2 | DIASTOLIC BLOOD PRESSURE: 78 MMHG | TEMPERATURE: 98.7 F | OXYGEN SATURATION: 97 %

## 2025-09-02 DIAGNOSIS — C90.00 MULTIPLE MYELOMA NOT HAVING ACHIEVED REMISSION (H): ICD-10-CM

## 2025-09-02 DIAGNOSIS — D80.1 HYPOGAMMAGLOBULINEMIA: Primary | ICD-10-CM

## 2025-09-02 DIAGNOSIS — C90.00 MULTIPLE MYELOMA NOT HAVING ACHIEVED REMISSION (H): Primary | ICD-10-CM

## 2025-09-02 LAB
ALBUMIN SERPL BCG-MCNC: 3.8 G/DL (ref 3.5–5.2)
ALP SERPL-CCNC: 133 U/L (ref 40–150)
ALT SERPL W P-5'-P-CCNC: 16 U/L (ref 0–50)
ANION GAP SERPL CALCULATED.3IONS-SCNC: 14 MMOL/L (ref 7–15)
AST SERPL W P-5'-P-CCNC: 16 U/L (ref 0–45)
BASOPHILS # BLD AUTO: 0.09 10E3/UL (ref 0–0.2)
BASOPHILS NFR BLD AUTO: 1.6 %
BILIRUB SERPL-MCNC: 0.4 MG/DL
BUN SERPL-MCNC: 15 MG/DL (ref 8–23)
CALCIUM SERPL-MCNC: 9.2 MG/DL (ref 8.8–10.4)
CHLORIDE SERPL-SCNC: 105 MMOL/L (ref 98–107)
CREAT SERPL-MCNC: 0.82 MG/DL (ref 0.51–0.95)
EGFRCR SERPLBLD CKD-EPI 2021: 74 ML/MIN/1.73M2
EOSINOPHIL # BLD AUTO: 0.28 10E3/UL (ref 0–0.7)
EOSINOPHIL NFR BLD AUTO: 5 %
ERYTHROCYTE [DISTWIDTH] IN BLOOD BY AUTOMATED COUNT: 15.2 % (ref 10–15)
GLUCOSE SERPL-MCNC: 126 MG/DL (ref 70–99)
HCO3 SERPL-SCNC: 20 MMOL/L (ref 22–29)
HCT VFR BLD AUTO: 38.7 % (ref 35–47)
HGB BLD-MCNC: 13.3 G/DL (ref 11.7–15.7)
IMM GRANULOCYTES # BLD: 0.06 10E3/UL
IMM GRANULOCYTES NFR BLD: 1.1 %
LYMPHOCYTES # BLD AUTO: 2.04 10E3/UL (ref 0.8–5.3)
LYMPHOCYTES NFR BLD AUTO: 36.2 %
MCH RBC QN AUTO: 31.2 PG (ref 26.5–33)
MCHC RBC AUTO-ENTMCNC: 34.4 G/DL (ref 31.5–36.5)
MCV RBC AUTO: 90.8 FL (ref 78–100)
MONOCYTES # BLD AUTO: 0.86 10E3/UL (ref 0–1.3)
MONOCYTES NFR BLD AUTO: 15.3 %
NEUTROPHILS # BLD AUTO: 2.3 10E3/UL (ref 1.6–8.3)
NEUTROPHILS NFR BLD AUTO: 40.8 %
NRBC # BLD AUTO: <0.03 10E3/UL
NRBC BLD AUTO-RTO: 0 /100
PLATELET # BLD AUTO: 196 10E3/UL (ref 150–450)
POTASSIUM SERPL-SCNC: 4 MMOL/L (ref 3.4–5.3)
PROT SERPL-MCNC: 6 G/DL (ref 6.4–8.3)
RBC # BLD AUTO: 4.26 10E6/UL (ref 3.8–5.2)
SODIUM SERPL-SCNC: 139 MMOL/L (ref 135–145)
WBC # BLD AUTO: 5.63 10E3/UL (ref 4–11)

## 2025-09-02 PROCEDURE — 82040 ASSAY OF SERUM ALBUMIN: CPT | Mod: ZL | Performed by: NURSE PRACTITIONER

## 2025-09-02 PROCEDURE — 36415 COLL VENOUS BLD VENIPUNCTURE: CPT | Performed by: NURSE PRACTITIONER

## 2025-09-02 PROCEDURE — 85025 COMPLETE CBC W/AUTO DIFF WBC: CPT | Mod: ZL | Performed by: NURSE PRACTITIONER

## 2025-09-02 PROCEDURE — 250N000011 HC RX IP 250 OP 636: Mod: JZ | Performed by: NURSE PRACTITIONER

## 2025-09-02 RX ORDER — MONTELUKAST SODIUM 10 MG/1
10 TABLET ORAL ONCE
Status: COMPLETED | OUTPATIENT
Start: 2025-09-02 | End: 2025-09-02

## 2025-09-02 RX ORDER — DEXAMETHASONE 4 MG/1
20 TABLET ORAL ONCE
Status: COMPLETED | OUTPATIENT
Start: 2025-09-02 | End: 2025-09-02

## 2025-09-02 RX ORDER — HEPARIN SODIUM,PORCINE 10 UNIT/ML
5-20 VIAL (ML) INTRAVENOUS DAILY PRN
OUTPATIENT
Start: 2025-09-02

## 2025-09-02 RX ORDER — DIPHENHYDRAMINE HYDROCHLORIDE 50 MG/ML
50 INJECTION INTRAMUSCULAR; INTRAVENOUS
Start: 2025-09-02

## 2025-09-02 RX ORDER — DIPHENHYDRAMINE HYDROCHLORIDE 50 MG/ML
25 INJECTION INTRAMUSCULAR; INTRAVENOUS
Start: 2025-09-02

## 2025-09-02 RX ORDER — ACETAMINOPHEN 325 MG/1
650 TABLET ORAL ONCE
Status: COMPLETED | OUTPATIENT
Start: 2025-09-02 | End: 2025-09-02

## 2025-09-02 RX ORDER — DIPHENHYDRAMINE HCL 50 MG
50 CAPSULE ORAL ONCE
Start: 2025-09-02

## 2025-09-02 RX ORDER — HEPARIN SODIUM (PORCINE) LOCK FLUSH IV SOLN 100 UNIT/ML 100 UNIT/ML
5 SOLUTION INTRAVENOUS
OUTPATIENT
Start: 2025-09-02

## 2025-09-02 RX ORDER — ALBUTEROL SULFATE 90 UG/1
1-2 INHALANT RESPIRATORY (INHALATION)
Start: 2025-09-02

## 2025-09-02 RX ORDER — HUMAN IMMUNOGLOBULIN G 20 G/200ML
0.4 LIQUID INTRAVENOUS ONCE
Status: COMPLETED | OUTPATIENT
Start: 2025-09-02 | End: 2025-09-02

## 2025-09-02 RX ORDER — MEPERIDINE HYDROCHLORIDE 25 MG/ML
25 INJECTION INTRAMUSCULAR; INTRAVENOUS; SUBCUTANEOUS
OUTPATIENT
Start: 2025-09-02

## 2025-09-02 RX ORDER — HYDROCORTISONE SODIUM SUCCINATE 100 MG/2ML
100 INJECTION INTRAMUSCULAR; INTRAVENOUS
OUTPATIENT
Start: 2025-09-02

## 2025-09-02 RX ORDER — ZOLEDRONIC ACID 0.04 MG/ML
4 INJECTION, SOLUTION INTRAVENOUS ONCE
OUTPATIENT
Start: 2025-09-02 | End: 2025-09-02

## 2025-09-02 RX ORDER — METHYLPREDNISOLONE SODIUM SUCCINATE 40 MG/ML
40 INJECTION INTRAMUSCULAR; INTRAVENOUS
Start: 2025-09-02

## 2025-09-02 RX ORDER — HUMAN IMMUNOGLOBULIN G 20 G/200ML
0.4 LIQUID INTRAVENOUS ONCE
Status: CANCELLED
Start: 2025-09-02

## 2025-09-02 RX ORDER — EPINEPHRINE 1 MG/ML
0.3 INJECTION, SOLUTION, CONCENTRATE INTRAVENOUS EVERY 5 MIN PRN
OUTPATIENT
Start: 2025-09-02

## 2025-09-02 RX ORDER — ACETAMINOPHEN 325 MG/1
650 TABLET ORAL ONCE
Start: 2025-09-02

## 2025-09-02 RX ORDER — DIPHENHYDRAMINE HCL 50 MG
50 CAPSULE ORAL ONCE
Status: COMPLETED | OUTPATIENT
Start: 2025-09-02 | End: 2025-09-02

## 2025-09-02 RX ORDER — ALBUTEROL SULFATE 0.83 MG/ML
2.5 SOLUTION RESPIRATORY (INHALATION)
OUTPATIENT
Start: 2025-09-02

## 2025-09-02 RX ADMIN — HUMAN IMMUNOGLOBULIN G 20 G: 20 LIQUID INTRAVENOUS at 12:35

## 2025-09-02 RX ADMIN — MONTELUKAST SODIUM 10 MG: 10 TABLET ORAL at 12:02

## 2025-09-02 RX ADMIN — DEXAMETHASONE 20 MG: 4 TABLET ORAL at 12:02

## 2025-09-02 RX ADMIN — Medication 250 ML: at 12:32

## 2025-09-02 RX ADMIN — DARATUMUMAB AND HYALURONIDASE-FIHJ (HUMAN RECOMBINANT) 1800 MG: 1800; 30000 INJECTION SUBCUTANEOUS at 12:37

## 2025-09-02 RX ADMIN — Medication 50 MG: at 12:01

## 2025-09-02 RX ADMIN — ACETAMINOPHEN 650 MG: 325 TABLET ORAL at 12:01

## 2025-09-02 ASSESSMENT — PAIN SCALES - GENERAL: PAINLEVEL_OUTOF10: NO PAIN (0)

## 2025-09-04 DIAGNOSIS — C90.00 MULTIPLE MYELOMA NOT HAVING ACHIEVED REMISSION (H): ICD-10-CM

## 2025-09-04 RX ORDER — DEXAMETHASONE 4 MG/1
TABLET ORAL
Qty: 15 TABLET | Refills: 0 | Status: SHIPPED | OUTPATIENT
Start: 2025-09-29

## (undated) DEVICE — TRAY-BIOPSY BONE W/JAMSHIDI NEEDLE

## (undated) DEVICE — GLV-7.5 PROTEXIS PI CLASSIC LF/PF

## (undated) DEVICE — STERI-STRIP-1/2" X 4"

## (undated) DEVICE — SWABSTICK-BENZOIN

## (undated) DEVICE — TAPE-MICROFOAM 2 INCH

## (undated) DEVICE — DRSG-SPONGE STERILE 4 X 4

## (undated) RX ORDER — LIDOCAINE HYDROCHLORIDE 20 MG/ML
INJECTION, SOLUTION EPIDURAL; INFILTRATION; INTRACAUDAL; PERINEURAL
Status: DISPENSED
Start: 2019-06-18

## (undated) RX ORDER — PROPOFOL 10 MG/ML
INJECTION, EMULSION INTRAVENOUS
Status: DISPENSED
Start: 2019-06-18